# Patient Record
Sex: MALE | Race: BLACK OR AFRICAN AMERICAN | NOT HISPANIC OR LATINO | Employment: UNEMPLOYED | URBAN - METROPOLITAN AREA
[De-identification: names, ages, dates, MRNs, and addresses within clinical notes are randomized per-mention and may not be internally consistent; named-entity substitution may affect disease eponyms.]

---

## 2016-12-09 LAB — HEMOCCULT STL QL IA: NORMAL

## 2017-01-23 RX ORDER — GLYBURIDE 5 MG/1
TABLET ORAL
Qty: 120 TABLET | Refills: 0 | Status: SHIPPED | OUTPATIENT
Start: 2017-01-23 | End: 2017-03-17 | Stop reason: SDUPTHER

## 2017-02-22 ENCOUNTER — OFFICE VISIT (OUTPATIENT)
Dept: FAMILY MEDICINE | Facility: CLINIC | Age: 52
End: 2017-02-22
Payer: COMMERCIAL

## 2017-02-22 VITALS
OXYGEN SATURATION: 99 % | HEART RATE: 88 BPM | RESPIRATION RATE: 16 BRPM | SYSTOLIC BLOOD PRESSURE: 128 MMHG | DIASTOLIC BLOOD PRESSURE: 88 MMHG | HEIGHT: 75 IN | WEIGHT: 271.19 LBS | TEMPERATURE: 98 F | BODY MASS INDEX: 33.72 KG/M2

## 2017-02-22 DIAGNOSIS — J01.10 ACUTE NON-RECURRENT FRONTAL SINUSITIS: ICD-10-CM

## 2017-02-22 DIAGNOSIS — I10 ESSENTIAL HYPERTENSION: Primary | ICD-10-CM

## 2017-02-22 DIAGNOSIS — N18.30 CKD (CHRONIC KIDNEY DISEASE), STAGE III: ICD-10-CM

## 2017-02-22 PROCEDURE — 3074F SYST BP LT 130 MM HG: CPT | Mod: S$GLB,,, | Performed by: FAMILY MEDICINE

## 2017-02-22 PROCEDURE — 3046F HEMOGLOBIN A1C LEVEL >9.0%: CPT | Mod: S$GLB,,, | Performed by: FAMILY MEDICINE

## 2017-02-22 PROCEDURE — 99999 PR PBB SHADOW E&M-EST. PATIENT-LVL III: CPT | Mod: PBBFAC,,, | Performed by: FAMILY MEDICINE

## 2017-02-22 PROCEDURE — 3079F DIAST BP 80-89 MM HG: CPT | Mod: S$GLB,,, | Performed by: FAMILY MEDICINE

## 2017-02-22 PROCEDURE — 99214 OFFICE O/P EST MOD 30 MIN: CPT | Mod: S$GLB,,, | Performed by: FAMILY MEDICINE

## 2017-02-22 PROCEDURE — 1160F RVW MEDS BY RX/DR IN RCRD: CPT | Mod: S$GLB,,, | Performed by: FAMILY MEDICINE

## 2017-02-22 PROCEDURE — 4010F ACE/ARB THERAPY RXD/TAKEN: CPT | Mod: S$GLB,,, | Performed by: FAMILY MEDICINE

## 2017-02-22 PROCEDURE — 2022F DILAT RTA XM EVC RTNOPTHY: CPT | Mod: S$GLB,,, | Performed by: FAMILY MEDICINE

## 2017-02-22 RX ORDER — AZITHROMYCIN 250 MG/1
TABLET, FILM COATED ORAL
Qty: 6 TABLET | Refills: 0 | Status: SHIPPED | OUTPATIENT
Start: 2017-02-22 | End: 2017-05-16 | Stop reason: ALTCHOICE

## 2017-02-22 RX ORDER — AZITHROMYCIN 250 MG/1
TABLET, FILM COATED ORAL
Qty: 6 TABLET | Refills: 0 | Status: SHIPPED | OUTPATIENT
Start: 2017-02-22 | End: 2017-02-22 | Stop reason: SDUPTHER

## 2017-02-22 RX ORDER — LORATADINE 10 MG/1
10 TABLET ORAL DAILY
Qty: 30 TABLET | Refills: 1 | Status: SHIPPED | OUTPATIENT
Start: 2017-02-22 | End: 2018-06-01

## 2017-02-22 RX ORDER — FLUTICASONE PROPIONATE 50 MCG
1 SPRAY, SUSPENSION (ML) NASAL DAILY
Qty: 16 G | Refills: 12 | Status: SHIPPED | OUTPATIENT
Start: 2017-02-22 | End: 2017-03-24

## 2017-02-22 RX ORDER — LORATADINE 10 MG/1
10 TABLET ORAL DAILY
Qty: 30 TABLET | Refills: 1 | Status: SHIPPED | OUTPATIENT
Start: 2017-02-22 | End: 2017-02-22 | Stop reason: SDUPTHER

## 2017-02-22 RX ORDER — FLUTICASONE PROPIONATE 50 MCG
1 SPRAY, SUSPENSION (ML) NASAL DAILY
Qty: 16 G | Refills: 12 | Status: SHIPPED | OUTPATIENT
Start: 2017-02-22 | End: 2017-02-22 | Stop reason: SDUPTHER

## 2017-02-22 NOTE — MR AVS SNAPSHOT
[facility] [address] [place] [address]  Phone: [phone]  Fax: [phone]                  Catalino Mcfadden   2017 7:40 AM   Office Visit    Description:  Male : 1965   Provider:  Js Davis MD   Department:  [facility]           Reason for Visit     Nasal Congestion     Cough     Chills           Diagnoses this Visit        Comments    Essential hypertension    -  Primary     CKD (chronic kidney disease), stage III         Uncontrolled type 2 diabetes mellitus with complication, without long-term current use of insulin         Acute non-recurrent frontal sinusitis                To Do List           Goals (5 Years of Data)     None      Follow-Up and Disposition     Return in about 4 weeks (around 3/22/2017).       These Medications        Disp Refills Start End    azithromycin (ZITHROMAX Z-DON) 250 MG tablet 6 tablet 0 2017     2 tabs today, then 1 tab per day for 4 days.    Pharmacy: [facility] [address] [place] [address] [address] AT [facility] Ph #: [phone]       fluticasone (FLONASE) 50 mcg/actuation nasal spray 16 g 12 2017 3/24/2017    1 spray by Each Nare route once daily. - Each Nare    Pharmacy: [facility] [employer] [place] [address] [address] AT [facility] Ph #: [phone]       loratadine (CLARITIN) 10 mg tablet 30 tablet 1 2017     Take 1 tablet (10 mg total) by mouth once daily. - Oral    Pharmacy: [facility] [employer] [facility] [place] [address] [address] AT [facility] Ph #: [phone]         [facility]     [facility] On Call Nurse Care Line -  Assistance  Registered nurses in the [facility] provide clinical advisement, health education, appointment booking, and other advisory services.  Call for this free service at [phone].             Medications           Message regarding  Medications     Verify the changes and/or additions to your medication regime listed below are the same as discussed with your clinician today.  If any of these changes or additions are incorrect, please notify your healthcare provider.        START taking these NEW medications        Refills    azithromycin (ZITHROMAX Z-DON) 250 MG tablet 0    Si tabs today, then 1 tab per day for 4 days.    Class: Normal    fluticasone (FLONASE) 50 mcg/actuation nasal spray 12    Si spray by Each Nare route once daily.    Class: Normal    Route: Each Nare    loratadine (CLARITIN) 10 mg tablet 1    Sig: Take 1 tablet (10 mg total) by mouth once daily.    Class: Normal    Route: Oral           Verify that the below list of medications is an accurate representation of the medications you are currently taking.  If none reported, the list may be blank. If incorrect, please contact your healthcare provider. Carry this list with you in case of emergency.           Current Medications     blood sugar diagnostic Strp 1 strip by Misc.(Non-Drug; Combo Route) route 3 (three) times daily. Patient needs One Touch Test Strips (Berio).    CARTIA  mg 24 hr capsule TAKE 1 CAPSULE(240 MG) BY MOUTH EVERY DAY    chlorthalidone (HYGROTEN) 25 MG Tab TAKE 1 TABLET(25 MG) BY MOUTH EVERY DAY    glyBURIDE (DIABETA) 5 MG tablet TAKE 2 TABLETS(10 MG) BY MOUTH TWICE DAILY WITH MEALS    lisinopril (PRINIVIL,ZESTRIL) 40 MG tablet Take 1 tablet (40 mg total) by mouth once daily.    sitagliptin (JANUVIA) 100 MG Tab Take 1 tablet (100 mg total) by mouth once daily.    azithromycin (ZITHROMAX Z-DON) 250 MG tablet 2 tabs today, then 1 tab per day for 4 days.    fluticasone (FLONASE) 50 mcg/actuation nasal spray 1 spray by Each Nare route once daily.    loratadine (CLARITIN) 10 mg tablet Take 1 tablet (10 mg total) by mouth once daily.           Clinical Reference Information           Your Vitals Were     BP Pulse Temp Resp Height Weight    128/88 (BP  "Location: Left arm, Patient Position: Sitting, BP Method: Manual) 88 98.2 °F (36.8 °C) (Oral) 16 6' 3" (1.905 m) 123 kg (271 lb 2.7 oz)    SpO2 BMI             99% 33.89 kg/m2         Blood Pressure          Most Recent Value    BP  128/88      Allergies as of 2/22/2017     No Known Allergies      Immunizations Administered on Date of Encounter - 2/22/2017     None      Language Assistance Services     ATTENTION: Language assistance services are available, free of charge. Please call [phone].      ATENCIÓN: Si habla español, tiene a hahn disposición servicios gratuitos de asistencia lingüística. Llame al [phone].     CHÚ Ý: N?u b?n nói Ti?ng Vi?t, có các d?ch v? h? tr? ngôn ng? mi?n phí dành cho b?n. G?i s? [phone].         [facility] complies with applicable Federal civil rights laws and does not discriminate on the basis of race, color, national origin, age, disability, or sex.        "
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
## 2017-03-06 RX ORDER — SITAGLIPTIN 100 MG/1
TABLET, FILM COATED ORAL
Qty: 30 TABLET | Refills: 0 | Status: SHIPPED | OUTPATIENT
Start: 2017-03-06 | End: 2017-03-06

## 2017-03-06 NOTE — TELEPHONE ENCOUNTER
----- Message from Saida Palomino sent at 3/6/2017 10:30 AM CST -----  Contact: self  [facility]ill [facility] for [facility]li[facility] (JANUVIA) 100 MG Tab[facility]   Patient's #   [phone]    LL
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
## 2017-03-06 NOTE — TELEPHONE ENCOUNTER
Patient requesting refill of Januvia.      LOV  2/22/2017  Last refill 9/2/2016   HgA1c    10.5    8/30/2016

## 2017-03-07 ENCOUNTER — LAB VISIT (OUTPATIENT)
Dept: LAB | Facility: HOSPITAL | Age: 52
End: 2017-03-07
Attending: FAMILY MEDICINE
Payer: COMMERCIAL

## 2017-03-07 DIAGNOSIS — E11.9 TYPE 2 DIABETES MELLITUS WITHOUT COMPLICATION: ICD-10-CM

## 2017-03-07 LAB
CHOLEST/HDLC SERPL: 6.1 {RATIO}
HDL/CHOLESTEROL RATIO: 16.3 %
HDLC SERPL-MCNC: 215 MG/DL
HDLC SERPL-MCNC: 35 MG/DL
LDLC SERPL CALC-MCNC: 134.8 MG/DL
NONHDLC SERPL-MCNC: 180 MG/DL
TRIGL SERPL-MCNC: 226 MG/DL

## 2017-03-07 PROCEDURE — 83036 HEMOGLOBIN GLYCOSYLATED A1C: CPT

## 2017-03-07 PROCEDURE — 80061 LIPID PANEL: CPT

## 2017-03-07 PROCEDURE — 36415 COLL VENOUS BLD VENIPUNCTURE: CPT | Mod: PO

## 2017-03-08 LAB
ESTIMATED AVG GLUCOSE: 240 MG/DL
HBA1C MFR BLD HPLC: 10 %

## 2017-03-20 RX ORDER — GLYBURIDE 5 MG/1
TABLET ORAL
Qty: 120 TABLET | Refills: 1 | Status: SHIPPED | OUTPATIENT
Start: 2017-03-20 | End: 2017-05-16 | Stop reason: SDUPTHER

## 2017-04-10 DIAGNOSIS — I10 ESSENTIAL HYPERTENSION: ICD-10-CM

## 2017-04-10 NOTE — TELEPHONE ENCOUNTER
Patient requesting refill of medication.    LOV   2/22/2017 - [place]            9/21/2016 - [place]    Left message informing patient of need for office visit.

## 2017-04-10 NOTE — TELEPHONE ENCOUNTER
----- Message from Mari Garnett sent at 4/10/2017  2:17 PM CDT -----  Contact: self  Pt is requesting a refill for CARTIA  mg 24 hr capsule. [phone] [facility]

## 2017-04-11 RX ORDER — DILTIAZEM HYDROCHLORIDE 240 MG/1
CAPSULE, COATED, EXTENDED RELEASE ORAL
Qty: 30 CAPSULE | Refills: 2 | Status: SHIPPED | OUTPATIENT
Start: 2017-04-11 | End: 2017-05-16 | Stop reason: SDUPTHER

## 2017-05-16 ENCOUNTER — OFFICE VISIT (OUTPATIENT)
Dept: FAMILY MEDICINE | Facility: CLINIC | Age: 52
End: 2017-05-16
Payer: COMMERCIAL

## 2017-05-16 VITALS
DIASTOLIC BLOOD PRESSURE: 88 MMHG | WEIGHT: 257.94 LBS | HEIGHT: 75 IN | TEMPERATURE: 98 F | BODY MASS INDEX: 32.07 KG/M2 | SYSTOLIC BLOOD PRESSURE: 126 MMHG | HEART RATE: 84 BPM | RESPIRATION RATE: 16 BRPM | OXYGEN SATURATION: 99 %

## 2017-05-16 DIAGNOSIS — E78.00 PURE HYPERCHOLESTEROLEMIA: ICD-10-CM

## 2017-05-16 DIAGNOSIS — S13.0XXS: ICD-10-CM

## 2017-05-16 DIAGNOSIS — I10 ESSENTIAL HYPERTENSION: ICD-10-CM

## 2017-05-16 PROCEDURE — 4010F ACE/ARB THERAPY RXD/TAKEN: CPT | Mod: S$GLB,,, | Performed by: FAMILY MEDICINE

## 2017-05-16 PROCEDURE — 3079F DIAST BP 80-89 MM HG: CPT | Mod: S$GLB,,, | Performed by: FAMILY MEDICINE

## 2017-05-16 PROCEDURE — 1160F RVW MEDS BY RX/DR IN RCRD: CPT | Mod: S$GLB,,, | Performed by: FAMILY MEDICINE

## 2017-05-16 PROCEDURE — 99214 OFFICE O/P EST MOD 30 MIN: CPT | Mod: S$GLB,,, | Performed by: FAMILY MEDICINE

## 2017-05-16 PROCEDURE — 3074F SYST BP LT 130 MM HG: CPT | Mod: S$GLB,,, | Performed by: FAMILY MEDICINE

## 2017-05-16 PROCEDURE — 3046F HEMOGLOBIN A1C LEVEL >9.0%: CPT | Mod: S$GLB,,, | Performed by: FAMILY MEDICINE

## 2017-05-16 PROCEDURE — 99999 PR PBB SHADOW E&M-EST. PATIENT-LVL III: CPT | Mod: PBBFAC,,, | Performed by: FAMILY MEDICINE

## 2017-05-16 RX ORDER — DILTIAZEM HYDROCHLORIDE 240 MG/1
CAPSULE, COATED, EXTENDED RELEASE ORAL
Qty: 90 CAPSULE | Refills: 1 | Status: SHIPPED | OUTPATIENT
Start: 2017-05-16 | End: 2018-01-09 | Stop reason: SDUPTHER

## 2017-05-16 RX ORDER — GLYBURIDE 5 MG/1
5 TABLET ORAL 2 TIMES DAILY WITH MEALS
Qty: 180 TABLET | Refills: 1 | Status: SHIPPED | OUTPATIENT
Start: 2017-05-16 | End: 2018-01-09 | Stop reason: SDUPTHER

## 2017-05-16 RX ORDER — LISINOPRIL 20 MG/1
20 TABLET ORAL DAILY
Qty: 90 TABLET | Refills: 1 | Status: SHIPPED | OUTPATIENT
Start: 2017-05-16 | End: 2018-01-09 | Stop reason: SDUPTHER

## 2017-05-16 RX ORDER — CHLORTHALIDONE 25 MG/1
TABLET ORAL
Qty: 90 TABLET | Refills: 1 | Status: SHIPPED | OUTPATIENT
Start: 2017-05-16 | End: 2018-01-09 | Stop reason: SDUPTHER

## 2017-05-16 NOTE — PROGRESS NOTES
Chief Complaint   Patient presents with    Diabetes     hemoglobin A1C follow up     Hypertension       Catalino Mcfadden is a 52 y.o. male who presents per the Chief Complaint.  Pt is known to me and was last seen by me on 9/21/2016.  All known chronic medical issues have been documented.       Diabetes   He presents for his follow-up diabetic visit. He has type 2 diabetes mellitus. His disease course has been improving. Pertinent negatives for hypoglycemia include no confusion, dizziness, headaches, hunger, mood changes, nervousness/anxiousness, pallor, seizures, sleepiness, speech difficulty, sweats or tremors. There are no diabetic associated symptoms. Pertinent negatives for diabetes include no blurred vision, no chest pain, no fatigue, no foot paresthesias, no foot ulcerations (callus on right foot ), no polydipsia, no polyphagia, no polyuria, no visual change, no weakness and no weight loss. There are no hypoglycemic complications. Symptoms are improving. There are no diabetic complications. Pertinent negatives for diabetic complications include no CVA. Risk factors for coronary artery disease include diabetes mellitus, dyslipidemia, male sex, obesity and hypertension. Current diabetic treatment includes oral agent (monotherapy). He is compliant with treatment most of the time. His weight is decreasing steadily. He is following a generally healthy diet. He has not had a previous visit with a dietitian. He participates in exercise three times a week. His home blood glucose trend is decreasing steadily. An ACE inhibitor/angiotensin II receptor blocker is being taken. He does not see a podiatrist.Eye exam is current.   Hypertension   This is a chronic problem. The current episode started more than 1 year ago. The problem has been gradually improving since onset. The problem is controlled. Associated symptoms include neck pain. Pertinent negatives include no anxiety, blurred vision, chest pain, headaches, peripheral  edema, shortness of breath or sweats. There are no associated agents to hypertension. Risk factors for coronary artery disease include diabetes mellitus, dyslipidemia, male gender and obesity. Past treatments include beta blockers, calcium channel blockers, diuretics and ACE inhibitors. The current treatment provides moderate improvement. There are no compliance problems.  Hypertensive end-organ damage includes kidney disease. There is no history of angina, CAD/MI, CVA, heart failure, left ventricular hypertrophy or a thyroid problem. Identifiable causes of hypertension include chronic renal disease. There is no history of coarctation of the aorta, a hypertension causing med or sleep apnea.        ROS  Review of Systems   Constitutional: Negative.  Negative for activity change, appetite change, chills, diaphoresis, fatigue, fever, unexpected weight change and weight loss.   HENT: Negative.  Negative for congestion, ear pain, hearing loss, nosebleeds, postnasal drip, rhinorrhea, sinus pressure, sneezing, sore throat and trouble swallowing.    Eyes: Negative for blurred vision, pain and visual disturbance.   Respiratory: Negative for cough, choking and shortness of breath.    Cardiovascular: Negative for chest pain and leg swelling.   Gastrointestinal: Negative for abdominal pain, bowel incontinence, constipation, diarrhea, nausea and vomiting.   Endocrine: Negative for polydipsia, polyphagia and polyuria.   Genitourinary: Negative for bladder incontinence, difficulty urinating, dysuria, flank pain, frequency, pelvic pain and urgency.   Musculoskeletal: Positive for neck pain. Negative for arthralgias, back pain, gait problem, joint swelling and myalgias.   Skin: Negative.  Negative for pallor.   Allergic/Immunologic: Negative for environmental allergies and food allergies.   Neurological: Negative.  Negative for dizziness, tingling, tremors, seizures, syncope, speech difficulty, weakness, light-headedness, numbness,  "headaches and paresthesias.   Psychiatric/Behavioral: Negative.  Negative for confusion, decreased concentration, dysphoric mood and sleep disturbance. The patient is not nervous/anxious.        Physical Exam  Vitals:    05/16/17 1009   BP: 126/88   Pulse: 84   Resp: 16   Temp: 98.3 °F (36.8 °C)    Body mass index is 32.24 kg/(m^2).  Weight: 117 kg (257 lb 15 oz)   Height: 6' 3" (190.5 cm)     Physical Exam   Constitutional: He is oriented to person, place, and time. He appears well-developed and well-nourished. He is active and cooperative.  Non-toxic appearance. He does not have a sickly appearance. He does not appear ill. No distress.   HENT:   Head: Normocephalic and atraumatic.   Right Ear: Hearing, tympanic membrane, external ear and ear canal normal. No tenderness. No foreign bodies. No mastoid tenderness. Tympanic membrane is not injected, not scarred, not perforated, not erythematous, not retracted and not bulging. No hemotympanum. No decreased hearing is noted.   Left Ear: Hearing, tympanic membrane, external ear and ear canal normal. No tenderness. No foreign bodies. No mastoid tenderness. Tympanic membrane is not injected, not scarred, not perforated, not erythematous, not retracted and not bulging. No hemotympanum. No decreased hearing is noted.   Nose: Nose normal. No sinus tenderness, nasal deformity or septal deviation. No epistaxis.  No foreign bodies.   Mouth/Throat: Uvula is midline, oropharynx is clear and moist and mucous membranes are normal. He does not have dentures. Normal dentition. No uvula swelling or dental caries. No oropharyngeal exudate, posterior oropharyngeal edema, posterior oropharyngeal erythema or tonsillar abscesses.   Eyes: Conjunctivae, EOM and lids are normal. Pupils are equal, round, and reactive to light. Right eye exhibits no chemosis, no discharge, no exudate and no hordeolum. No foreign body present in the right eye. Left eye exhibits no chemosis, no discharge, no " exudate and no hordeolum. No foreign body present in the left eye. No scleral icterus.   Neck: Normal range of motion and full passive range of motion without pain. Neck supple. No thyroid mass and no thyromegaly present.   Cardiovascular: Normal rate, regular rhythm, S1 normal, S2 normal and normal heart sounds.  Exam reveals no gallop and no friction rub.    No murmur heard.  Pulmonary/Chest: Effort normal and breath sounds normal. He has no decreased breath sounds. He has no wheezes. He has no rhonchi. He has no rales.   Abdominal: Soft. Normal appearance and bowel sounds are normal. He exhibits no distension, no ascites and no mass. There is no hepatosplenomegaly. There is no tenderness. There is no rigidity, no rebound and no guarding. No hernia.   Musculoskeletal:        Cervical back: He exhibits tenderness. He exhibits normal range of motion, no bony tenderness, no swelling, no edema, no deformity, no laceration, no pain, no spasm and normal pulse.        Lumbar back: He exhibits tenderness, pain and spasm. He exhibits normal range of motion, no bony tenderness, no swelling, no edema, no deformity and normal pulse.   Return of full range of motion following surgery; pain resolved.   Lymphadenopathy:        Head (right side): No submental, no submandibular, no preauricular and no posterior auricular adenopathy present.        Head (left side): No submental, no submandibular, no preauricular and no posterior auricular adenopathy present.     He has no cervical adenopathy.   Neurological: He is alert and oriented to person, place, and time. He has normal strength. He displays no atrophy and no tremor. No cranial nerve deficit or sensory deficit. He exhibits normal muscle tone. He displays no seizure activity.   Skin: Skin is warm, dry and intact. No rash noted. He is not diaphoretic. No pallor.   Psychiatric: He has a normal mood and affect. His speech is normal and behavior is normal. Judgment and thought  content normal. Cognition and memory are normal. He is attentive.   Vitals reviewed.      Assessment & Plan    1. Uncontrolled type 2 diabetes mellitus with stage 3 chronic kidney disease, without long-term current use of insulin  Patient is encouraged to continue a diet low in carbohydrates and simple sugars.  Advised to begin or continue a weight loss program which focuses on good food choices and increased physical activity and encouraged to adhere to medication regimen and check glucose as recommended daily and report any changes in usual trends.  Screening blood test is not due at this time.  Foot exam was not done at this visit.  Will decrease dosage of glyburide due to home readings of low blood sugar.  - SITagliptan (JANUVIA) 100 MG Tab; Take 1 tablet (100 mg total) by mouth once daily.  Dispense: 90 tablet; Refill: 1  - glyBURIDE (DIABETA) 5 MG tablet; Take 1 tablet (5 mg total) by mouth 2 (two) times daily with meals.  Dispense: 180 tablet; Refill: 1    2. Essential hypertension  Patient was counseled and encouraged to maintain a low sodium diet, as well as increasing physical activity.  Recommend random BP checks at home on a regular basis.  Repeat BP at end of visit was not needed. Will continue medication at this time, and follow up in 4 weeks, or sooner if blood pressure is not well controlled.     - lisinopril (PRINIVIL,ZESTRIL) 20 MG tablet; Take 1 tablet (20 mg total) by mouth once daily.  Dispense: 90 tablet; Refill: 1  - chlorthalidone (HYGROTEN) 25 MG Tab; TAKE 1 TABLET(25 MG) BY MOUTH EVERY DAY  Dispense: 90 tablet; Refill: 1  - diltiaZEM (CARTIA XT) 240 MG 24 hr capsule; TAKE 1 CAPSULE(240 MG) BY MOUTH EVERY DAY  Dispense: 90 capsule; Refill: 1    3. Pure hypercholesterolemia  We discussed ways to manage cholesterol levels, including increasing whole grain foods and decreasing fried and fatty foods.  I also recommended OTC Omega 3 and Omega 6 supplements to improve overall cholesterol levels.   Will not start therapy at this visit; patient is not due for screening blood test at this time.  Significant lifestyle changes recently with positive results on blood pressure, blood sugar, and weight.    4. Traumatic rupture of cervical intervertebral disc, sequela  Managed by employer physician due to work related injury; stable.          Follow up documented    ACTIVE MEDICAL ISSUES:  Documented in Problem List    PAST MEDICAL HISTORY  Documented    PAST SURGICAL HISTORY:  Documented    SOCIAL HISTORY:  Documented    FAMILY HISTORY:  Documented    ALLERGIES AND MEDICATIONS: updated and reviewed.  Documented    Health Maintenance       Date Due Completion Date    TETANUS VACCINE 4/4/1982 ---    Pneumococcal PPSV23 (Medium Risk) (1) 4/4/1982 ---    Eye Exam 8/31/2016 8/31/2015    Override on 9/26/2014: Done (future)    Hemoglobin A1c 6/7/2017 3/7/2017    Override on 5/23/2016: Done    Override on 7/6/2015: Done (future)    Influenza Vaccine 8/1/2017 7/6/2015 (Declined)    Override on 7/6/2015: Declined (not at this present time)    Foot Exam 8/3/2017 8/3/2016    Override on 7/6/2015: Done (today in ov)    Lipid Panel 3/7/2018 3/7/2017    Colonoscopy 9/26/2024 9/26/2014 (Done)    Override on 9/26/2014: Done (future)

## 2017-05-25 ENCOUNTER — TELEPHONE (OUTPATIENT)
Dept: ADMINISTRATIVE | Facility: HOSPITAL | Age: 52
End: 2017-05-25

## 2017-06-05 ENCOUNTER — OFFICE VISIT (OUTPATIENT)
Dept: PODIATRY | Facility: CLINIC | Age: 52
End: 2017-06-05
Payer: COMMERCIAL

## 2017-06-05 VITALS
HEIGHT: 75 IN | DIASTOLIC BLOOD PRESSURE: 76 MMHG | WEIGHT: 257 LBS | SYSTOLIC BLOOD PRESSURE: 132 MMHG | BODY MASS INDEX: 31.95 KG/M2

## 2017-06-05 DIAGNOSIS — L84 CORN OR CALLUS: ICD-10-CM

## 2017-06-05 DIAGNOSIS — L84 PRE-ULCERATIVE CALLUSES: ICD-10-CM

## 2017-06-05 DIAGNOSIS — E11.49 TYPE II DIABETES MELLITUS WITH NEUROLOGICAL MANIFESTATIONS: Primary | ICD-10-CM

## 2017-06-05 DIAGNOSIS — M20.41 HAMMER TOES OF BOTH FEET: ICD-10-CM

## 2017-06-05 DIAGNOSIS — M20.42 HAMMER TOES OF BOTH FEET: ICD-10-CM

## 2017-06-05 DIAGNOSIS — M20.10 HALLUX ABDUCTO VALGUS, UNSPECIFIED LATERALITY: ICD-10-CM

## 2017-06-05 PROCEDURE — 99999 PR PBB SHADOW E&M-EST. PATIENT-LVL III: CPT | Mod: PBBFAC,,, | Performed by: PODIATRIST

## 2017-06-05 PROCEDURE — 99499 UNLISTED E&M SERVICE: CPT | Mod: S$GLB,,, | Performed by: PODIATRIST

## 2017-06-05 PROCEDURE — 11056 PARNG/CUTG B9 HYPRKR LES 2-4: CPT | Mod: S$GLB,,, | Performed by: PODIATRIST

## 2017-06-05 RX ORDER — LISINOPRIL 20 MG/1
TABLET ORAL
COMMUNITY
Start: 2017-05-16 | End: 2018-01-12

## 2017-06-05 NOTE — PATIENT INSTRUCTIONS
Recommend lotions: eucerin, aquaphor, A&D ointment, gold bond for diabetics        Shoe recommendations: (try 6pm.com, zappos.com , nordstromrack.Webcom, or shoes.com for discounted prices) you can visit [employer] shoes in [place] as well    Asics (GT 1000 or gel foundations), new balance, saucony (stabil c3),  Valencia (transcend), vionic, propet (tennis shoe)    soft brand, clarks, crocs, aerosoles, naturalizers, SAS, ecco, mode, pauline buck, rockports (dress shoes)    Vionic, volitiles, burkenstocks, fitflops, propet (sandals)    Nike comfort thong sandals, crocs (house shoes)      Nail Home remedy:  Vicks Vapor rub to cuticles  Listerine and apple cider vinegar in a spray bottle to nails       occasional soaks for 15-20 mins in luke warm water with 1 cup of listerine and 1 cup of apple cider vinegar is ok          Diabetes: Inspecting Your Feet    Diabetes increases your chances of developing foot problems. So inspect your feet every day. This helps you find small skin irritations before they become serious ulcers or infections. If you have trouble seeing the bottoms of your feet, use a mirror or ask a family member or friend to help.  How to check your feet  Below are tips to help you look for foot problems. Try to check your feet at the same time each day, such as when you get out of bed in the morning:  · Check the top of each foot. The tops of toes, back of the heel, and outer edge of the foot can get a lot of rubbing from poor-fitting shoes.  · Check the bottom of each foot. Daily wear and tear often leads to problems at pressure spots.  · Check the toes and nails. Fungal infections often occur between toes. Toenail problems can also be a sign of fungal infections or lead to breaks in the skin.  · Check your shoes, too. Loose objects inside a shoe can injure the foot. Use your hand to feel inside your shoes for things like james, loose stitching, or rough areas that could irritate your skin.  Warning signs  Look for  any color changes in the foot. Redness with streaks can signal a severe infection, which needs immediate medical attention. Tell your healthcare provider right away if you have any of these problems:  · Swelling, sometimes with color changes, may be a sign of poor blood flow or infection. Symptoms include tenderness and an increase in the size of your foot.  · Warm or hot areas on your feet may be signs of infection. A foot that is cold may not be getting enough blood.  · Sensations such as burning, tingling, or pins and needles can be signs of a problem. Also check for areas that may be numb.  · Hot spots are caused by friction or pressure. Look for hot spots in areas that get a lot of rubbing. Hot spots can turn into blisters, calluses, or sores.  · Cracks and sores are caused by dry or irritated skin. They are a sign that the skin is breaking down, which can lead to infection.  · Toenail problems to watch for include nails growing into the skin (ingrown toenail) and causing redness or pain. Thick, yellow, or discolored nails can signal a fungal infection.  · Drainage and odor can develop from untreated sores and ulcers. Call your healthcare provider right away if you notice white or yellow drainage, bleeding, or unpleasant odor.   Date Last Reviewed: 6/1/2016 © 2000-2016 [employer]. [address] [place] [address]. All rights reserved. This information is not intended as a substitute for professional medical care. Always follow your healthcare professional's instructions.        What Are Corns and Calluses?    Corns and calluses are your bodys response to friction or pressure against the skin. If your foot rubs the inside of your shoe, the affected area of skin thickens. Or, if a bone is not in the normal position, skin caught between bone and shoe or bone and ground builds up. In either case, the outer layer of skin thickens to protect the foot from unusual pressure. In many cases,  corns and calluses look bad but are not harmful. However, more severe corns and calluses may become infected, destroy healthy tissue, or affect foot movement.    Corns  Corns usually grow on top of the foot, often at the toe joint. Corns can range from a slight thickening of skin to a painful soft or hard bump. They often form on top of buckled toe joints (hammer toes). If your toes curl under, corns may grow on the tips of the toes. You may also get a corn on the end of a toe if it rubs against your shoe. Corns can also grow between toes, often between the first and second toes.    Calluses  Calluses grow on the bottom of the foot or on the outer edge of a toe or heel. A callus may spread across the ball of your foot. This type of callus is usually due to a problem with a metatarsal (the long bone at the base of a toe, near the ball of the foot). A pinch callus may grow along the outer edge of the heel or the big toe. Some calluses press up into the foot instead of spreading on the outside. A callus may form a central core or plug of tissue where pressure is greatest.  Date Last Reviewed: 9/21/2015 © 2000-2016 [employer] [address] [place] [address], [place] [address]. All rights reserved. This information is not intended as a substitute for professional medical care. Always follow your healthcare professional's instructions.
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
## 2017-06-16 ENCOUNTER — OFFICE VISIT (OUTPATIENT)
Dept: FAMILY MEDICINE | Facility: CLINIC | Age: 52
End: 2017-06-16
Payer: COMMERCIAL

## 2017-06-16 ENCOUNTER — LAB VISIT (OUTPATIENT)
Dept: LAB | Facility: HOSPITAL | Age: 52
End: 2017-06-16
Attending: FAMILY MEDICINE
Payer: COMMERCIAL

## 2017-06-16 VITALS
SYSTOLIC BLOOD PRESSURE: 130 MMHG | BODY MASS INDEX: 31.3 KG/M2 | HEART RATE: 80 BPM | HEIGHT: 75 IN | TEMPERATURE: 98 F | WEIGHT: 251.75 LBS | OXYGEN SATURATION: 98 % | DIASTOLIC BLOOD PRESSURE: 88 MMHG | RESPIRATION RATE: 15 BRPM

## 2017-06-16 DIAGNOSIS — E78.00 PURE HYPERCHOLESTEROLEMIA: ICD-10-CM

## 2017-06-16 DIAGNOSIS — Z13.5 SCREENING FOR EYE CONDITION: ICD-10-CM

## 2017-06-16 DIAGNOSIS — Z23 NEED FOR PNEUMOCOCCAL VACCINATION: ICD-10-CM

## 2017-06-16 DIAGNOSIS — I10 ESSENTIAL HYPERTENSION: ICD-10-CM

## 2017-06-16 LAB
CHOLEST/HDLC SERPL: 5.7 {RATIO}
HDL/CHOLESTEROL RATIO: 17.5 %
HDLC SERPL-MCNC: 228 MG/DL
HDLC SERPL-MCNC: 40 MG/DL
LDLC SERPL CALC-MCNC: 162.6 MG/DL
NONHDLC SERPL-MCNC: 188 MG/DL
TRIGL SERPL-MCNC: 127 MG/DL

## 2017-06-16 PROCEDURE — 90471 IMMUNIZATION ADMIN: CPT | Mod: S$GLB,,, | Performed by: FAMILY MEDICINE

## 2017-06-16 PROCEDURE — 4010F ACE/ARB THERAPY RXD/TAKEN: CPT | Mod: S$GLB,,, | Performed by: FAMILY MEDICINE

## 2017-06-16 PROCEDURE — 80061 LIPID PANEL: CPT

## 2017-06-16 PROCEDURE — 36415 COLL VENOUS BLD VENIPUNCTURE: CPT | Mod: PO

## 2017-06-16 PROCEDURE — 3045F PR MOST RECENT HEMOGLOBIN A1C LEVEL 7.0-9.0%: CPT | Mod: S$GLB,,, | Performed by: FAMILY MEDICINE

## 2017-06-16 PROCEDURE — 99999 PR PBB SHADOW E&M-EST. PATIENT-LVL III: CPT | Mod: PBBFAC,,, | Performed by: FAMILY MEDICINE

## 2017-06-16 PROCEDURE — 99214 OFFICE O/P EST MOD 30 MIN: CPT | Mod: 25,S$GLB,, | Performed by: FAMILY MEDICINE

## 2017-06-16 PROCEDURE — 83036 HEMOGLOBIN GLYCOSYLATED A1C: CPT

## 2017-06-16 PROCEDURE — 90732 PPSV23 VACC 2 YRS+ SUBQ/IM: CPT | Mod: S$GLB,,, | Performed by: FAMILY MEDICINE

## 2017-06-16 RX ORDER — LISINOPRIL 20 MG/1
20 TABLET ORAL DAILY
COMMUNITY
Start: 2017-06-14 | End: 2018-01-12

## 2017-06-16 NOTE — PROGRESS NOTES
Administered Pneumococcal 23 vaccine SC to right arm.  Patient tolerated injection well, no adverse reactions noted.

## 2017-06-16 NOTE — PROGRESS NOTES
Chief Complaint   Patient presents with    Diabetes     follow up        Catalino Mcfadden is a 52 y.o. male who presents per the Chief Complaint.  Pt is known to me and was last seen by me on 5/16/2017.  All known chronic medical issues have been documented.       Diabetes   He presents for his follow-up diabetic visit. He has type 2 diabetes mellitus. No MedicAlert identification noted. The initial diagnosis of diabetes was made 20 years ago. His disease course has been improving. Pertinent negatives for hypoglycemia include no confusion, dizziness, headaches, hunger, mood changes, nervousness/anxiousness, pallor, seizures, sleepiness, speech difficulty, sweats or tremors. Associated symptoms include foot paresthesias. Pertinent negatives for diabetes include no blurred vision, no chest pain, no fatigue, no foot ulcerations, no polydipsia, no polyphagia, no polyuria, no visual change, no weakness and no weight loss. There are no hypoglycemic complications. Pertinent negatives for hypoglycemia complications include no blackouts, no hospitalization, no nocturnal hypoglycemia, no required assistance and no required glucagon injection. Symptoms are improving. Diabetic complications include nephropathy, peripheral neuropathy and retinopathy. Pertinent negatives for diabetic complications include no autonomic neuropathy, CVA, heart disease or PVD. Risk factors for coronary artery disease include hypertension, obesity, sedentary lifestyle and diabetes mellitus. Current diabetic treatment includes diet and oral agent (dual therapy). He is compliant with treatment all of the time. His weight is decreasing steadily. He is following a generally healthy, low fat/cholesterol and low salt diet. Meal planning includes avoidance of concentrated sweets. He has not had a previous visit with a dietitian. He participates in exercise intermittently. He monitors blood glucose at home 1-2 x per day. He monitors urine at home <1 x per month.  Blood glucose monitoring compliance is fair. His home blood glucose trend is decreasing steadily. An ACE inhibitor/angiotensin II receptor blocker is being taken. He sees a podiatrist.Eye exam is not current.   Hypertension   This is a chronic problem. The current episode started more than 1 year ago. The problem is unchanged. The problem is controlled. Associated symptoms include neck pain. Pertinent negatives include no anxiety, blurred vision, chest pain, headaches, peripheral edema, shortness of breath or sweats. There are no associated agents to hypertension. Risk factors for coronary artery disease include diabetes mellitus, dyslipidemia, male gender and obesity. Past treatments include beta blockers, calcium channel blockers, diuretics and ACE inhibitors. The current treatment provides moderate improvement. There are no compliance problems.  Hypertensive end-organ damage includes kidney disease and retinopathy. There is no history of angina, CAD/MI, CVA, heart failure, left ventricular hypertrophy, PVD or a thyroid problem. Identifiable causes of hypertension include chronic renal disease. There is no history of coarctation of the aorta, a hypertension causing med or sleep apnea.        ROS  Review of Systems   Constitutional: Negative.  Negative for activity change, appetite change, chills, diaphoresis, fatigue, fever, unexpected weight change and weight loss.   HENT: Negative.  Negative for congestion, ear pain, hearing loss, nosebleeds, postnasal drip, rhinorrhea, sinus pressure, sneezing, sore throat and trouble swallowing.    Eyes: Negative for blurred vision, pain and visual disturbance.   Respiratory: Negative for cough, choking and shortness of breath.    Cardiovascular: Negative for chest pain and leg swelling.   Gastrointestinal: Positive for constipation. Negative for abdominal pain, diarrhea, nausea and vomiting.   Endocrine: Negative for polydipsia, polyphagia and polyuria.   Genitourinary:  "Negative for difficulty urinating, dysuria, frequency and urgency.   Musculoskeletal: Positive for neck pain. Negative for arthralgias, back pain, gait problem, joint swelling and myalgias.   Skin: Negative.  Negative for pallor.   Allergic/Immunologic: Negative for environmental allergies and food allergies.   Neurological: Negative.  Negative for dizziness, tremors, seizures, syncope, speech difficulty, weakness, light-headedness and headaches.   Psychiatric/Behavioral: Negative.  Negative for confusion, decreased concentration, dysphoric mood and sleep disturbance. The patient is not nervous/anxious.        Physical Exam  Vitals:    06/16/17 0931   BP: 130/88   Pulse: 80   Resp: 15   Temp: 98 °F (36.7 °C)    Body mass index is 31.47 kg/m².  Weight: 114.2 kg (251 lb 12.3 oz)   Height: 6' 3" (190.5 cm)     Physical Exam   Constitutional: He is oriented to person, place, and time. He appears well-developed and well-nourished. He is active and cooperative.  Non-toxic appearance. He does not have a sickly appearance. He does not appear ill. No distress.   HENT:   Head: Normocephalic and atraumatic.   Right Ear: Hearing and external ear normal. No decreased hearing is noted.   Left Ear: Hearing and external ear normal. No decreased hearing is noted.   Nose: Nose normal. No rhinorrhea or nasal deformity.   Mouth/Throat: Uvula is midline and oropharynx is clear and moist. He does not have dentures. Normal dentition.   Eyes: Conjunctivae, EOM and lids are normal. Pupils are equal, round, and reactive to light. Right eye exhibits no chemosis, no discharge and no exudate. No foreign body present in the right eye. Left eye exhibits no chemosis, no discharge and no exudate. No foreign body present in the left eye. No scleral icterus.   Neck: Normal range of motion and full passive range of motion without pain. Neck supple.   Cardiovascular: Normal rate, regular rhythm, S1 normal, S2 normal and normal heart sounds.  Exam " reveals no gallop and no friction rub.    No murmur heard.  Pulmonary/Chest: Effort normal and breath sounds normal. No accessory muscle usage. No respiratory distress. He has no decreased breath sounds. He has no wheezes. He has no rhonchi. He has no rales.   Abdominal: Soft. Normal appearance. He exhibits no distension. There is no hepatosplenomegaly. There is no tenderness. There is no rigidity, no rebound and no guarding.   Musculoskeletal: Normal range of motion.   Neurological: He is alert and oriented to person, place, and time. He has normal strength. No cranial nerve deficit or sensory deficit. He exhibits normal muscle tone. He displays no seizure activity. Coordination and gait normal.   Skin: Skin is warm, dry and intact. No rash noted. He is not diaphoretic.   Psychiatric: He has a normal mood and affect. His speech is normal and behavior is normal. Judgment and thought content normal. Cognition and memory are normal. He is attentive.       Assessment & Plan    1. Uncontrolled type 2 diabetes mellitus with stage 3 chronic kidney disease, without long-term current use of insulin  Patient is encouraged to continue a diet low in carbohydrates and simple sugars.  Advised to begin or continue a weight loss program which focuses on good food choices and increased physical activity and encouraged to adhere to medication regimen and check glucose as recommended daily and report any changes in usual trends.  Screening blood test is due at this time.  Foot exam was not done at this visit.   - Hemoglobin A1c; Future    2. Essential hypertension  Patient was counseled and encouraged to maintain a low sodium diet, as well as increasing physical activity.  Recommend random BP checks at home on a regular basis.  Repeat BP at end of visit was not necessary. Will continue medication at this time, and follow up in 3 months, or sooner if blood pressure is not well controlled.       3. Pure hypercholesterolemia  We  discussed ways to manage cholesterol levels, including increasing whole grain foods and decreasing fried and fatty foods.  I also recommended OTC Omega 3 and Omega 6 supplements to improve overall cholesterol levels.  Will continue current therapy at this visit; patient is due for screening blood test at this time.   - Lipid panel; Future    4. Need for pneumococcal vaccination  Patient due for pneumonia vaccine; Patient agreed and vaccine was administered in clinic today without complications.   - (In Office Administered) Pneumococcal Polysaccharide Vaccine (23 Valent) (SQ/IM)    5. Screening for eye condition  Managed by [facility] in [place].         Follow up documented    ACTIVE MEDICAL ISSUES:  Documented in Problem List    PAST MEDICAL HISTORY  Documented    PAST SURGICAL HISTORY:  Documented    SOCIAL HISTORY:  Documented    FAMILY HISTORY:  Documented    ALLERGIES AND MEDICATIONS: updated and reviewed.  Documented    Health Maintenance       Date Due Completion Date    TETANUS VACCINE 04/04/1982 ---    Pneumococcal PPSV23 (Medium Risk) (1) 04/04/1982 ---    Eye Exam 08/31/2016 8/31/2015    Override on 9/26/2014: Done (future)    Hemoglobin A1c 06/07/2017 3/7/2017    Override on 5/23/2016: Done    Override on 7/6/2015: Done (future)    Influenza Vaccine 08/01/2017 7/6/2015 (Declined)    Override on 7/6/2015: Declined (not at this present time)    Fecal Occult Blood Test (FOBT)/[facility] 12/09/2017 12/9/2016    Override on 12/9/2016: Done ([facility])    Lipid Panel 03/07/2018 3/7/2017    Foot Exam 06/05/2018 6/5/2017    Override on 7/6/2015: Done (today in ov)

## 2017-06-17 LAB
ESTIMATED AVG GLUCOSE: 160 MG/DL
HBA1C MFR BLD HPLC: 7.2 %

## 2017-06-19 ENCOUNTER — PATIENT MESSAGE (OUTPATIENT)
Dept: FAMILY MEDICINE | Facility: CLINIC | Age: 52
End: 2017-06-19

## 2017-06-19 DIAGNOSIS — E78.00 PURE HYPERCHOLESTEROLEMIA: Primary | ICD-10-CM

## 2017-06-19 RX ORDER — ATORVASTATIN CALCIUM 40 MG/1
40 TABLET, FILM COATED ORAL DAILY
Qty: 90 TABLET | Refills: 3 | Status: SHIPPED | OUTPATIENT
Start: 2017-06-19 | End: 2018-06-01

## 2017-09-20 ENCOUNTER — OFFICE VISIT (OUTPATIENT)
Dept: PODIATRY | Facility: CLINIC | Age: 52
End: 2017-09-20
Payer: COMMERCIAL

## 2017-09-20 VITALS
WEIGHT: 251 LBS | HEIGHT: 75 IN | SYSTOLIC BLOOD PRESSURE: 126 MMHG | HEART RATE: 92 BPM | BODY MASS INDEX: 31.21 KG/M2 | DIASTOLIC BLOOD PRESSURE: 88 MMHG

## 2017-09-20 DIAGNOSIS — M20.10 HALLUX ABDUCTO VALGUS, UNSPECIFIED LATERALITY: ICD-10-CM

## 2017-09-20 DIAGNOSIS — M20.42 HAMMER TOES OF BOTH FEET: ICD-10-CM

## 2017-09-20 DIAGNOSIS — L84 CORN OR CALLUS: ICD-10-CM

## 2017-09-20 DIAGNOSIS — L97.519 TYPE 2 DIABETES MELLITUS WITH RIGHT DIABETIC FOOT ULCER: ICD-10-CM

## 2017-09-20 DIAGNOSIS — E11.49 TYPE II DIABETES MELLITUS WITH NEUROLOGICAL MANIFESTATIONS: Primary | ICD-10-CM

## 2017-09-20 DIAGNOSIS — E11.621 TYPE 2 DIABETES MELLITUS WITH RIGHT DIABETIC FOOT ULCER: ICD-10-CM

## 2017-09-20 DIAGNOSIS — N18.30 CKD (CHRONIC KIDNEY DISEASE), STAGE III: ICD-10-CM

## 2017-09-20 DIAGNOSIS — M20.41 HAMMER TOES OF BOTH FEET: ICD-10-CM

## 2017-09-20 DIAGNOSIS — B35.1 ONYCHOMYCOSIS DUE TO DERMATOPHYTE: ICD-10-CM

## 2017-09-20 PROCEDURE — 99213 OFFICE O/P EST LOW 20 MIN: CPT | Mod: 25,S$GLB,, | Performed by: PODIATRIST

## 2017-09-20 PROCEDURE — 11056 PARNG/CUTG B9 HYPRKR LES 2-4: CPT | Mod: Q9,S$GLB,, | Performed by: PODIATRIST

## 2017-09-20 PROCEDURE — 99999 PR PBB SHADOW E&M-EST. PATIENT-LVL III: CPT | Mod: PBBFAC,,, | Performed by: PODIATRIST

## 2017-09-20 PROCEDURE — 3074F SYST BP LT 130 MM HG: CPT | Mod: S$GLB,,, | Performed by: PODIATRIST

## 2017-09-20 PROCEDURE — 3045F PR MOST RECENT HEMOGLOBIN A1C LEVEL 7.0-9.0%: CPT | Mod: S$GLB,,, | Performed by: PODIATRIST

## 2017-09-20 PROCEDURE — 3079F DIAST BP 80-89 MM HG: CPT | Mod: S$GLB,,, | Performed by: PODIATRIST

## 2017-09-20 PROCEDURE — 4010F ACE/ARB THERAPY RXD/TAKEN: CPT | Mod: S$GLB,,, | Performed by: PODIATRIST

## 2017-09-20 PROCEDURE — 3008F BODY MASS INDEX DOCD: CPT | Mod: S$GLB,,, | Performed by: PODIATRIST

## 2017-09-20 PROCEDURE — 11721 DEBRIDE NAIL 6 OR MORE: CPT | Mod: Q9,59,S$GLB, | Performed by: PODIATRIST
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
## 2017-09-20 NOTE — PATIENT INSTRUCTIONS
Wound care Instructions:   · Once a day beginning in7 days:   ¨ Clean the toe separate from your body with warm running water and antibacterial soap such as dial for five minutes.  ¨ Clean any remaining crust away with soap and water using a cotton-tipped applicator.  ¨ DRY COMPLETELY  ¨ Apply betadine to the wound.  ¨ Cover with a breathable bandage until there is no more drainage or open flesh.  · Change the dressing daily, or whenever it becomes wet or dirty.  · If you were prescribed antibiotics, take them as directed until they are all gone.  · Wear comfortable shoes with a lot of toe room, or open-toe sandals, while your toe is healing.  · You may use acetaminophen or ibuprofen to control pain, unless another medicine was prescribed. If you have chronic liver or kidney disease or ever had a stomach ulcer or GI bleeding, talk with your doctor before using these medicines.      When to seek medical advice  Call your health care provider right away if any of the following occur:  · Increasing redness, pain or swelling of the toe  · Red streaks in the skin leading away from the wound  · Continued pus or fluid drainage for more than 24 hours  · Fever of 100.4º F (38º C) or higher, or as directed by your health care provider      Recommend lotions: eucerin, aquaphor, A&D ointment, gold bond for diabetics, sween; urea 40 with aloe (found on amazon.com)    Shoe recommendations: (try 6pm.com, zappos.Bswift , [email or web address].Bswift, or shoes.Bswift for discounted prices) you can visit DSW shoes in [place] as well    Asics (GT 2000 or gel foundations), new balance, sauccassie (stabil c3),  [place] (GTS or Beast or transcend), vionic, propet (tennis shoe)    sofft brand, clarks, crocs, aerosoles, naturalizers, SAS, ecco, mode, pauline buck, rockports (dress shoes)    Vionic, burkenstocks, fitflops, propet (sandals)    Nike comfort thong sandals, crocs, propet (house shoes)    Nail Home remedy:  Vicks Vapor rub to nails for easier  managability    Occasional soaks for 15-20 mins in luke warm water with 1 cup of listerine and 1 cup of apple cider vinegar are ok You may add several drops of oil of oregano or tea tree oil as well          Diabetes: Inspecting Your Feet  Diabetes increases your chances of developing foot problems. So inspect your feet every day. This helps you find small skin irritations before they become serious infections. If you have trouble seeing the bottoms of your feet, use a mirror or ask a family member or friend to help.     Pressure spots on the bottom of the foot are common areas where problems develop.   How to check your feet  Below are tips to help you look for foot problems. Try to check your feet at the same time each day, such as when you get out of bed in the morning:  · Check the top of each foot. The tops of toes, back of the heel, and outer edge of the foot can get a lot of rubbing from poor-fitting shoes.  · Check the bottom of each foot. Daily wear and tear often leads to problems at pressure spots.  · Check the toes and nails. Fungal infections often occur between toes. Toenail problems can also be a sign of fungal infections or lead to breaks in the skin.  · Check your shoes, too. Loose objects inside a shoe can injure the foot. Use your hand to feel inside your shoes for things like james, loose stitching, or rough areas that could irritate your skin.  Warning signs  Look for any color changes in the foot. Redness with streaks can signal a severe infection, which needs immediate medical attention. Tell your doctor right away if you have any of these problems:  · Swelling, sometimes with color changes, may be a sign of poor blood flow or infection. Symptoms include tenderness and an increase in the size of your foot.  · Warm or hot areas on your feet may be signs of infection. A foot that is cold may not be getting enough blood.  · Sensations such as burning, tingling, or pins and needles can be  signs of a problem. Also check for areas that may be numb.  · Hot spots are caused by friction or pressure. Look for hot spots in areas that get a lot of rubbing. Hot spots can turn into blisters, calluses, or sores.  · Cracks and sores are caused by dry or irritated skin. They are a sign that the skin is breaking down, which can lead to infection.  · Toenail problems to watch for include nails growing into the skin (ingrown toenail) and causing redness or pain. Thick, yellow, or discolored nails can signal a fungal infection.  · Drainage and odor can develop from untreated sores and ulcers. Call your doctor right away if you notice white or yellow drainage, bleeding, or unpleasant odor.   © 3963-5184 [employer]. [address] [place] [address], [place] [address]. All rights reserved. This information is not intended as a substitute for professional medical care. Always follow your healthcare professional's instructions.

## 2017-09-28 DIAGNOSIS — E11.9 TYPE 2 DIABETES MELLITUS WITHOUT COMPLICATION: ICD-10-CM

## 2017-10-09 ENCOUNTER — OFFICE VISIT (OUTPATIENT)
Dept: PODIATRY | Facility: CLINIC | Age: 52
End: 2017-10-09
Payer: COMMERCIAL

## 2017-10-09 VITALS
HEIGHT: 75 IN | WEIGHT: 251 LBS | SYSTOLIC BLOOD PRESSURE: 138 MMHG | BODY MASS INDEX: 31.21 KG/M2 | DIASTOLIC BLOOD PRESSURE: 90 MMHG

## 2017-10-09 DIAGNOSIS — M20.42 HAMMER TOES OF BOTH FEET: ICD-10-CM

## 2017-10-09 DIAGNOSIS — M20.41 HAMMER TOES OF BOTH FEET: ICD-10-CM

## 2017-10-09 DIAGNOSIS — E11.621 TYPE 2 DIABETES MELLITUS WITH RIGHT DIABETIC FOOT ULCER: ICD-10-CM

## 2017-10-09 DIAGNOSIS — L97.519 TYPE 2 DIABETES MELLITUS WITH RIGHT DIABETIC FOOT ULCER: ICD-10-CM

## 2017-10-09 DIAGNOSIS — M20.10 HALLUX ABDUCTO VALGUS, UNSPECIFIED LATERALITY: ICD-10-CM

## 2017-10-09 DIAGNOSIS — E11.49 TYPE II DIABETES MELLITUS WITH NEUROLOGICAL MANIFESTATIONS: Primary | ICD-10-CM

## 2017-10-09 PROCEDURE — 99999 PR PBB SHADOW E&M-EST. PATIENT-LVL III: CPT | Mod: PBBFAC,,, | Performed by: PODIATRIST

## 2017-10-09 PROCEDURE — 99213 OFFICE O/P EST LOW 20 MIN: CPT | Mod: S$GLB,,, | Performed by: PODIATRIST
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
## 2017-10-10 LAB — HEMOCCULT STL QL IA: NORMAL

## 2017-10-16 ENCOUNTER — TELEPHONE (OUTPATIENT)
Dept: ADMINISTRATIVE | Facility: HOSPITAL | Age: 52
End: 2017-10-16

## 2017-11-28 ENCOUNTER — OFFICE VISIT (OUTPATIENT)
Dept: PODIATRY | Facility: CLINIC | Age: 52
End: 2017-11-28
Payer: COMMERCIAL

## 2017-11-28 VITALS
SYSTOLIC BLOOD PRESSURE: 141 MMHG | BODY MASS INDEX: 31.21 KG/M2 | DIASTOLIC BLOOD PRESSURE: 83 MMHG | HEART RATE: 75 BPM | WEIGHT: 251 LBS | HEIGHT: 75 IN

## 2017-11-28 DIAGNOSIS — L84 CORN OR CALLUS: ICD-10-CM

## 2017-11-28 DIAGNOSIS — E11.49 TYPE II DIABETES MELLITUS WITH NEUROLOGICAL MANIFESTATIONS: Primary | ICD-10-CM

## 2017-11-28 DIAGNOSIS — M20.42 HAMMER TOES OF BOTH FEET: ICD-10-CM

## 2017-11-28 DIAGNOSIS — M20.10 HALLUX ABDUCTO VALGUS, UNSPECIFIED LATERALITY: ICD-10-CM

## 2017-11-28 DIAGNOSIS — M20.41 HAMMER TOES OF BOTH FEET: ICD-10-CM

## 2017-11-28 DIAGNOSIS — N18.30 CKD (CHRONIC KIDNEY DISEASE), STAGE III: ICD-10-CM

## 2017-11-28 DIAGNOSIS — B35.1 ONYCHOMYCOSIS DUE TO DERMATOPHYTE: ICD-10-CM

## 2017-11-28 PROCEDURE — 11721 DEBRIDE NAIL 6 OR MORE: CPT | Mod: 59,Q9,S$GLB, | Performed by: PODIATRIST

## 2017-11-28 PROCEDURE — 99999 PR PBB SHADOW E&M-EST. PATIENT-LVL III: CPT | Mod: PBBFAC,,, | Performed by: PODIATRIST

## 2017-11-28 PROCEDURE — 11056 PARNG/CUTG B9 HYPRKR LES 2-4: CPT | Mod: Q9,S$GLB,, | Performed by: PODIATRIST

## 2017-11-28 PROCEDURE — 99499 UNLISTED E&M SERVICE: CPT | Mod: S$GLB,,, | Performed by: PODIATRIST
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
## 2018-01-09 DIAGNOSIS — I10 ESSENTIAL HYPERTENSION: ICD-10-CM

## 2018-01-12 RX ORDER — SITAGLIPTIN 100 MG/1
TABLET, FILM COATED ORAL
Qty: 90 TABLET | Refills: 0 | Status: SHIPPED | OUTPATIENT
Start: 2018-01-12 | End: 2018-06-01 | Stop reason: ALTCHOICE

## 2018-01-12 RX ORDER — CHLORTHALIDONE 25 MG/1
TABLET ORAL
Qty: 90 TABLET | Refills: 0 | Status: SHIPPED | OUTPATIENT
Start: 2018-01-12 | End: 2018-05-23 | Stop reason: SDUPTHER

## 2018-01-12 RX ORDER — DILTIAZEM HYDROCHLORIDE 240 MG/1
CAPSULE, EXTENDED RELEASE ORAL
Qty: 90 CAPSULE | Refills: 0 | Status: SHIPPED | OUTPATIENT
Start: 2018-01-12 | End: 2018-03-28 | Stop reason: SDUPTHER

## 2018-01-12 RX ORDER — LISINOPRIL 20 MG/1
TABLET ORAL
Qty: 90 TABLET | Refills: 0 | Status: SHIPPED | OUTPATIENT
Start: 2018-01-12 | End: 2018-03-28 | Stop reason: SDUPTHER

## 2018-01-12 RX ORDER — GLYBURIDE 5 MG/1
TABLET ORAL
Qty: 180 TABLET | Refills: 0 | Status: SHIPPED | OUTPATIENT
Start: 2018-01-12 | End: 2018-03-28 | Stop reason: SDUPTHER

## 2018-02-12 ENCOUNTER — OFFICE VISIT (OUTPATIENT)
Dept: FAMILY MEDICINE | Facility: CLINIC | Age: 53
End: 2018-02-12
Payer: COMMERCIAL

## 2018-02-12 ENCOUNTER — LAB VISIT (OUTPATIENT)
Dept: LAB | Facility: HOSPITAL | Age: 53
End: 2018-02-12
Attending: FAMILY MEDICINE
Payer: COMMERCIAL

## 2018-02-12 VITALS
DIASTOLIC BLOOD PRESSURE: 100 MMHG | HEIGHT: 75 IN | HEART RATE: 88 BPM | TEMPERATURE: 98 F | RESPIRATION RATE: 16 BRPM | OXYGEN SATURATION: 96 % | BODY MASS INDEX: 33.41 KG/M2 | WEIGHT: 268.75 LBS | SYSTOLIC BLOOD PRESSURE: 152 MMHG

## 2018-02-12 DIAGNOSIS — I10 ESSENTIAL HYPERTENSION: ICD-10-CM

## 2018-02-12 DIAGNOSIS — J32.0 CHRONIC MAXILLARY SINUSITIS: ICD-10-CM

## 2018-02-12 DIAGNOSIS — E78.00 PURE HYPERCHOLESTEROLEMIA: ICD-10-CM

## 2018-02-12 DIAGNOSIS — E11.9 TYPE 2 DIABETES MELLITUS WITHOUT COMPLICATION: ICD-10-CM

## 2018-02-12 LAB
CHOLEST SERPL-MCNC: 251 MG/DL
CHOLEST/HDLC SERPL: 6 {RATIO}
ESTIMATED AVG GLUCOSE: 252 MG/DL
HBA1C MFR BLD HPLC: 10.4 %
HDLC SERPL-MCNC: 42 MG/DL
HDLC SERPL: 16.7 %
LDLC SERPL CALC-MCNC: 182.6 MG/DL
NONHDLC SERPL-MCNC: 209 MG/DL
TRIGL SERPL-MCNC: 132 MG/DL

## 2018-02-12 PROCEDURE — 99214 OFFICE O/P EST MOD 30 MIN: CPT | Mod: S$GLB,,, | Performed by: FAMILY MEDICINE

## 2018-02-12 PROCEDURE — 36415 COLL VENOUS BLD VENIPUNCTURE: CPT | Mod: PO

## 2018-02-12 PROCEDURE — 3008F BODY MASS INDEX DOCD: CPT | Mod: S$GLB,,, | Performed by: FAMILY MEDICINE

## 2018-02-12 PROCEDURE — 80061 LIPID PANEL: CPT

## 2018-02-12 PROCEDURE — 99999 PR PBB SHADOW E&M-EST. PATIENT-LVL III: CPT | Mod: PBBFAC,,, | Performed by: FAMILY MEDICINE

## 2018-02-12 PROCEDURE — 83036 HEMOGLOBIN GLYCOSYLATED A1C: CPT

## 2018-02-12 RX ORDER — AZITHROMYCIN 250 MG/1
TABLET, FILM COATED ORAL
Qty: 6 TABLET | Refills: 0 | Status: SHIPPED | OUTPATIENT
Start: 2018-02-12 | End: 2018-03-28 | Stop reason: ALTCHOICE

## 2018-02-12 NOTE — PROGRESS NOTES
Chief Complaint   Patient presents with    Diabetes    Hypertension       Catalino Mcfadden is a 52 y.o. male who presents per the Chief Complaint.  Pt is known to me and was last seen by me on 6/16/2017.  All known chronic medical issues have been documented.       Diabetes   He presents for his follow-up diabetic visit. He has type 2 diabetes mellitus. No MedicAlert identification noted. The initial diagnosis of diabetes was made 20 years ago. His disease course has been improving. Pertinent negatives for hypoglycemia include no confusion, dizziness, headaches, hunger, mood changes, nervousness/anxiousness, pallor, seizures, sleepiness, speech difficulty, sweats or tremors. Associated symptoms include foot paresthesias. Pertinent negatives for diabetes include no blurred vision, no chest pain, no fatigue, no foot ulcerations, no polydipsia, no polyphagia, no polyuria, no visual change, no weakness and no weight loss. There are no hypoglycemic complications. Pertinent negatives for hypoglycemia complications include no blackouts, no hospitalization, no nocturnal hypoglycemia, no required assistance and no required glucagon injection. Symptoms are improving. Diabetic complications include nephropathy, peripheral neuropathy and retinopathy. Pertinent negatives for diabetic complications include no autonomic neuropathy, CVA, heart disease or PVD. Risk factors for coronary artery disease include hypertension, obesity, sedentary lifestyle and diabetes mellitus. Current diabetic treatment includes diet and oral agent (dual therapy). He is compliant with treatment all of the time. His weight is decreasing steadily. He is following a generally healthy, low fat/cholesterol and low salt diet. Meal planning includes avoidance of concentrated sweets. He has not had a previous visit with a dietitian. He participates in exercise intermittently. He monitors blood glucose at home 1-2 x per day. He monitors urine at home <1 x per  month. Blood glucose monitoring compliance is fair. His home blood glucose trend is decreasing steadily. An ACE inhibitor/angiotensin II receptor blocker is being taken. He sees a podiatrist.Eye exam is not current.   Hypertension   This is a chronic problem. The current episode started more than 1 year ago. The problem is unchanged. The problem is controlled. Associated symptoms include neck pain (improved). Pertinent negatives include no anxiety, blurred vision, chest pain, headaches, peripheral edema, shortness of breath or sweats. There are no associated agents to hypertension. Risk factors for coronary artery disease include diabetes mellitus, dyslipidemia, male gender and obesity. Past treatments include beta blockers, calcium channel blockers, diuretics and ACE inhibitors. The current treatment provides moderate improvement. There are no compliance problems.  Hypertensive end-organ damage includes kidney disease and retinopathy. There is no history of angina, CAD/MI, CVA, heart failure, left ventricular hypertrophy or PVD. Identifiable causes of hypertension include chronic renal disease. There is no history of coarctation of the aorta, a hypertension causing med, sleep apnea or a thyroid problem.        ROS  Review of Systems   Constitutional: Negative.  Negative for activity change, appetite change, chills, diaphoresis, fatigue, fever, unexpected weight change and weight loss.   HENT: Negative.  Negative for congestion, ear pain, hearing loss, nosebleeds, postnasal drip, rhinorrhea, sinus pressure, sneezing, sore throat and trouble swallowing.    Eyes: Negative for blurred vision, pain and visual disturbance.   Respiratory: Negative for cough, choking and shortness of breath.    Cardiovascular: Negative for chest pain and leg swelling.   Gastrointestinal: Positive for constipation. Negative for abdominal distention, abdominal pain, anal bleeding, blood in stool, diarrhea, nausea, rectal pain and vomiting.  "  Endocrine: Negative for polydipsia, polyphagia and polyuria.   Genitourinary: Negative for difficulty urinating, dysuria, frequency and urgency.   Musculoskeletal: Positive for neck pain (improved). Negative for arthralgias, back pain, gait problem, joint swelling, myalgias and neck stiffness.   Skin: Negative.  Negative for pallor.   Allergic/Immunologic: Negative for environmental allergies and food allergies.   Neurological: Negative.  Negative for dizziness, tremors, seizures, syncope, speech difficulty, weakness, light-headedness and headaches.   Psychiatric/Behavioral: Negative.  Negative for confusion, decreased concentration, dysphoric mood and sleep disturbance. The patient is not nervous/anxious.        Physical Exam  Vitals:    02/12/18 1116   BP: (!) 152/100   Pulse: 88   Resp: 16   Temp: 97.8 °F (36.6 °C)    Body mass index is 33.59 kg/m².  Weight: 121.9 kg (268 lb 11.9 oz)   Height: 6' 3" (190.5 cm)     Physical Exam   Constitutional: He is oriented to person, place, and time. He appears well-developed and well-nourished. He is active and cooperative.  Non-toxic appearance. He does not have a sickly appearance. He does not appear ill. No distress.   HENT:   Head: Normocephalic and atraumatic.   Right Ear: Hearing and external ear normal. No decreased hearing is noted.   Left Ear: Hearing and external ear normal. No decreased hearing is noted.   Nose: Nose normal. No rhinorrhea or nasal deformity.   Mouth/Throat: Uvula is midline and oropharynx is clear and moist. He does not have dentures. Normal dentition.   Eyes: Conjunctivae, EOM and lids are normal. Pupils are equal, round, and reactive to light. Right eye exhibits no chemosis, no discharge and no exudate. No foreign body present in the right eye. Left eye exhibits no chemosis, no discharge and no exudate. No foreign body present in the left eye. No scleral icterus.   Neck: Normal range of motion and full passive range of motion without pain. " Neck supple.   Cardiovascular: Normal rate, regular rhythm, S1 normal, S2 normal and normal heart sounds.  Exam reveals no gallop and no friction rub.    No murmur heard.  Pulmonary/Chest: Effort normal and breath sounds normal. No accessory muscle usage. No respiratory distress. He has no decreased breath sounds. He has no wheezes. He has no rhonchi. He has no rales.   Abdominal: Soft. Normal appearance. He exhibits no distension. There is no hepatosplenomegaly. There is no tenderness. There is no rigidity, no rebound and no guarding.   Musculoskeletal: Normal range of motion.   Neurological: He is alert and oriented to person, place, and time. He has normal strength. No cranial nerve deficit or sensory deficit. He exhibits normal muscle tone. He displays no seizure activity. Coordination and gait normal.   Skin: Skin is warm, dry and intact. No rash noted. He is not diaphoretic.   Psychiatric: He has a normal mood and affect. His speech is normal and behavior is normal. Judgment and thought content normal. Cognition and memory are normal. He is attentive.       Assessment & Plan    1. Uncontrolled type 2 diabetes mellitus with stage 3 chronic kidney disease, without long-term current use of insulin  Patient is encouraged to follow a diet low in carbohydrates and simple sugars.  Advised to focus on good food choices and increased physical activity and encouraged to adhere to medication regimen and check glucose as recommended daily and contact office if glucose levels are not improving over time.  Screening blood test is due at this time.  Foot exam was not done at this visit.   - Ambulatory referral to Ophthalmology    2. Essential hypertension  Patient was counseled and encouraged to maintain a low sodium diet, as well as increasing physical activity.  Recommend random BP checks at home on a regular basis.  Repeat BP at end of visit was not improved. Will continue medication at this time, and follow up in 4-6  weeks, or sooner if blood pressure does not improve over time.  Patient advised to return for nurse BP check in one week.     3. Pure hypercholesterolemia  We discussed ways to manage cholesterol levels, including increasing whole grain foods and decreasing fried and fatty foods.  I also recommended OTC Omega 3 and Omega 6 supplements to improve overall cholesterol levels.  Will continue current therapy at this visit; patient is due for screening blood test at this time.   - Lipid panel; Future    4. Chronic maxillary sinusitis  Chronic infection identified by dentist on x-ray; will start antibiotics to resolve infection and re-evaluate prior to planned dental work.  - azithromycin (Z-DON) 250 MG tablet; Take 2 tablets by mouth on day 1; Take 1 tablet by mouth on days 2-5  Dispense: 6 tablet; Refill: 0       Follow up documented    ACTIVE MEDICAL ISSUES:  Documented in Problem List    PAST MEDICAL HISTORY  Documented    PAST SURGICAL HISTORY:  Documented    SOCIAL HISTORY:  Documented    FAMILY HISTORY:  Documented    ALLERGIES AND MEDICATIONS: updated and reviewed.  Documented    Health Maintenance       Date Due Completion Date    TETANUS VACCINE 04/04/1982 ---    Eye Exam 08/31/2016 8/31/2015    Override on 9/26/2014: Done (future)    Influenza Vaccine 08/01/2017 7/6/2015 (Declined)    Override on 7/6/2015: Declined (not at this present time)    Hemoglobin A1c 09/16/2017 6/16/2017    Override on 5/23/2016: Done    Override on 7/6/2015: Done (future)    Lipid Panel 06/16/2018 6/16/2017    Fecal Occult Blood Test (FOBT)/FitKit 10/10/2018 10/10/2017 (Done)    Override on 10/10/2017: Done (bioIQ scanned)    Override on 12/9/2016: Done (Bio Iq lab)    Foot Exam 11/28/2018 11/28/2017 (Done)    Override on 11/28/2017: Done    Override on 9/20/2017: Done    Override on 7/6/2015: Done (today in ov)    Low Dose Statin 11/29/2018 11/29/2017    Pneumococcal PPSV23 (Medium Risk) (2) 04/04/2029 6/16/2017

## 2018-02-14 ENCOUNTER — TELEPHONE (OUTPATIENT)
Dept: OPTOMETRY | Facility: CLINIC | Age: 53
End: 2018-02-14

## 2018-03-13 DIAGNOSIS — I10 ESSENTIAL HYPERTENSION: ICD-10-CM

## 2018-03-13 RX ORDER — CHLORTHALIDONE 25 MG/1
TABLET ORAL
Qty: 90 TABLET | Refills: 0 | Status: CANCELLED | OUTPATIENT
Start: 2018-03-13

## 2018-03-13 RX ORDER — LISINOPRIL 20 MG/1
TABLET ORAL
Qty: 90 TABLET | Refills: 0 | Status: CANCELLED | OUTPATIENT
Start: 2018-03-13

## 2018-03-13 NOTE — TELEPHONE ENCOUNTER
Spoke with patient. States that he needs refills on medications. Also questioning about Trulicity. Says him and provider spoke about it before.Please advise.

## 2018-03-13 NOTE — TELEPHONE ENCOUNTER
----- Message from Ino Tavarez sent at 3/13/2018 10:40 AM CDT -----  Contact: Self  Pt needs PA on JANUVIA 100 mg Tab. Also requesting refill on lisinopril (PRINIVIL,ZESTRIL) 20 MG tablet and chlorthalidone (HYGROTEN) 25 MG Tab. Pt uses [facility]. Pt can be reached @ [phone].

## 2018-03-14 ENCOUNTER — CLINICAL SUPPORT (OUTPATIENT)
Dept: FAMILY MEDICINE | Facility: CLINIC | Age: 53
End: 2018-03-14
Payer: COMMERCIAL

## 2018-03-14 DIAGNOSIS — I10 ESSENTIAL HYPERTENSION: Primary | ICD-10-CM

## 2018-03-14 PROCEDURE — 99499 UNLISTED E&M SERVICE: CPT | Mod: S$GLB,,, | Performed by: FAMILY MEDICINE

## 2018-03-14 NOTE — PROGRESS NOTES
...  Catalino Mcfadden 52 y.o. male is here today for Blood Pressure check.   History of HTN yes.    Review of patient's allergies indicates:  No Known Allergies  Creatinine   Date Value Ref Range Status   05/23/2016 2.2 (H) 0.5 - 1.4 mg/dL Final     Sodium   Date Value Ref Range Status   05/23/2016 135 (L) 136 - 145 mmol/L Final     Potassium   Date Value Ref Range Status   05/23/2016 5.1 3.5 - 5.1 mmol/L Final   ]  Patient verifies taking blood pressure medications on a regular basis at the same time of the day.     Current Outpatient Prescriptions:     atorvastatin (LIPITOR) 40 MG tablet, Take 1 tablet (40 mg total) by mouth once daily., Disp: 90 tablet, Rfl: 3    azithromycin (Z-DON) 250 MG tablet, Take 2 tablets by mouth on day 1; Take 1 tablet by mouth on days 2-5, Disp: 6 tablet, Rfl: 0    blood sugar diagnostic Strp, 1 strip by Misc.(Non-Drug; Combo Route) route 3 (three) times daily. Patient needs One Touch Test Strips (Berio)., Disp: 100 strip, Rfl: 2    CARTIA  mg 24 hr capsule, TAKE 1 CAPSULE(240 MG) BY MOUTH EVERY DAY, Disp: 90 capsule, Rfl: 0    chlorthalidone (HYGROTEN) 25 MG Tab, TAKE 1 TABLET(25 MG) BY MOUTH EVERY DAY, Disp: 90 tablet, Rfl: 0    glyBURIDE (DIABETA) 5 MG tablet, TAKE 1 TABLET(5 MG) BY MOUTH TWICE DAILY WITH MEALS, Disp: 180 tablet, Rfl: 0    JANUVIA 100 mg Tab, TAKE 1 TABLET(100 MG) BY MOUTH EVERY DAY, Disp: 90 tablet, Rfl: 0    lisinopril (PRINIVIL,ZESTRIL) 20 MG tablet, TAKE 1 TABLET(20 MG) BY MOUTH EVERY DAY, Disp: 90 tablet, Rfl: 0    loratadine (CLARITIN) 10 mg tablet, Take 1 tablet (10 mg total) by mouth once daily., Disp: 30 tablet, Rfl: 1  Does patient have record of home blood pressure readings no. Readings have been averaging N/A.   Last dose of blood pressure medication was taken at 3/13/2018.  Patient is asymptomatic.   Complains of N/A.      ,   .    Blood pressure reading after 15 minutes was 138/92.  Dr. Lombard notified.

## 2018-03-28 ENCOUNTER — OFFICE VISIT (OUTPATIENT)
Dept: FAMILY MEDICINE | Facility: CLINIC | Age: 53
End: 2018-03-28
Payer: COMMERCIAL

## 2018-03-28 VITALS
HEIGHT: 75 IN | DIASTOLIC BLOOD PRESSURE: 88 MMHG | OXYGEN SATURATION: 97 % | RESPIRATION RATE: 17 BRPM | BODY MASS INDEX: 31.41 KG/M2 | SYSTOLIC BLOOD PRESSURE: 120 MMHG | HEART RATE: 94 BPM | WEIGHT: 252.63 LBS | TEMPERATURE: 98 F

## 2018-03-28 DIAGNOSIS — E78.00 PURE HYPERCHOLESTEROLEMIA: ICD-10-CM

## 2018-03-28 DIAGNOSIS — I10 ESSENTIAL HYPERTENSION: ICD-10-CM

## 2018-03-28 PROCEDURE — 3079F DIAST BP 80-89 MM HG: CPT | Mod: CPTII,S$GLB,, | Performed by: FAMILY MEDICINE

## 2018-03-28 PROCEDURE — 99999 PR PBB SHADOW E&M-EST. PATIENT-LVL III: CPT | Mod: PBBFAC,,, | Performed by: FAMILY MEDICINE

## 2018-03-28 PROCEDURE — 3046F HEMOGLOBIN A1C LEVEL >9.0%: CPT | Mod: CPTII,S$GLB,, | Performed by: FAMILY MEDICINE

## 2018-03-28 PROCEDURE — 99214 OFFICE O/P EST MOD 30 MIN: CPT | Mod: S$GLB,,, | Performed by: FAMILY MEDICINE

## 2018-03-28 PROCEDURE — 3074F SYST BP LT 130 MM HG: CPT | Mod: CPTII,S$GLB,, | Performed by: FAMILY MEDICINE

## 2018-03-28 RX ORDER — DILTIAZEM HYDROCHLORIDE 240 MG/1
CAPSULE, COATED, EXTENDED RELEASE ORAL
Qty: 90 CAPSULE | Refills: 0 | Status: SHIPPED | OUTPATIENT
Start: 2018-03-28 | End: 2018-04-22 | Stop reason: SDUPTHER

## 2018-03-28 RX ORDER — LISINOPRIL 20 MG/1
TABLET ORAL
Qty: 90 TABLET | Refills: 0 | Status: SHIPPED | OUTPATIENT
Start: 2018-03-28 | End: 2018-04-22 | Stop reason: SDUPTHER

## 2018-03-28 RX ORDER — GLYBURIDE 5 MG/1
TABLET ORAL
Qty: 180 TABLET | Refills: 0 | Status: SHIPPED | OUTPATIENT
Start: 2018-03-28 | End: 2018-04-22 | Stop reason: SDUPTHER

## 2018-03-28 NOTE — PROGRESS NOTES
Chief Complaint   Patient presents with    Hypertension    Diabetes       Catalino Mcfadden is a 52 y.o. male who presents per the Chief Complaint.  Pt is known to me and was last seen by me on 2/12/2018.  All known chronic medical issues have been documented.       Hypertension   This is a chronic problem. The current episode started more than 1 year ago. The problem is unchanged. The problem is controlled. Associated symptoms include neck pain (improved). Pertinent negatives include no anxiety, blurred vision, chest pain, headaches, peripheral edema, shortness of breath or sweats. There are no associated agents to hypertension. Risk factors for coronary artery disease include diabetes mellitus, dyslipidemia, male gender and obesity. Past treatments include beta blockers, calcium channel blockers, diuretics and ACE inhibitors. The current treatment provides moderate improvement. There are no compliance problems.  Hypertensive end-organ damage includes kidney disease and retinopathy. There is no history of angina, CAD/MI, CVA, heart failure, left ventricular hypertrophy or PVD. Identifiable causes of hypertension include chronic renal disease. There is no history of coarctation of the aorta, a hypertension causing med, sleep apnea or a thyroid problem.   Diabetes   He presents for his follow-up diabetic visit. He has type 2 diabetes mellitus. No MedicAlert identification noted. The initial diagnosis of diabetes was made 20 years ago. His disease course has been improving. Hypoglycemia symptoms include nervousness/anxiousness. Pertinent negatives for hypoglycemia include no confusion, dizziness, headaches, hunger, mood changes, pallor, seizures, sleepiness, speech difficulty, sweats or tremors. Associated symptoms include foot paresthesias. Pertinent negatives for diabetes include no blurred vision, no chest pain, no fatigue, no foot ulcerations, no polydipsia, no polyphagia, no polyuria, no visual change, no weakness  and no weight loss. There are no hypoglycemic complications. Pertinent negatives for hypoglycemia complications include no blackouts, no hospitalization, no nocturnal hypoglycemia, no required assistance and no required glucagon injection. Symptoms are improving. Diabetic complications include nephropathy, peripheral neuropathy and retinopathy. Pertinent negatives for diabetic complications include no autonomic neuropathy, CVA, heart disease or PVD. Risk factors for coronary artery disease include hypertension, obesity, sedentary lifestyle and diabetes mellitus. Current diabetic treatment includes diet and oral agent (dual therapy). He is compliant with treatment all of the time. His weight is decreasing steadily. He is following a generally healthy, low fat/cholesterol and low salt diet. Meal planning includes avoidance of concentrated sweets. He has not had a previous visit with a dietitian. He participates in exercise intermittently. He monitors blood glucose at home 1-2 x per day. He monitors urine at home <1 x per month. Blood glucose monitoring compliance is fair. His home blood glucose trend is decreasing steadily. An ACE inhibitor/angiotensin II receptor blocker is being taken. He sees a podiatrist.Eye exam is not current.        ROS  Review of Systems   Constitutional: Negative.  Negative for activity change, appetite change, chills, diaphoresis, fatigue, fever, unexpected weight change and weight loss.   HENT: Negative.  Negative for congestion, ear pain, hearing loss, nosebleeds, postnasal drip, rhinorrhea, sinus pressure, sneezing, sore throat and trouble swallowing.    Eyes: Negative for blurred vision, pain and visual disturbance.   Respiratory: Negative for cough, choking and shortness of breath.    Cardiovascular: Negative for chest pain and leg swelling.   Gastrointestinal: Positive for constipation. Negative for abdominal distention, abdominal pain, anal bleeding, blood in stool, diarrhea, nausea,  "rectal pain and vomiting.   Endocrine: Negative for polydipsia, polyphagia and polyuria.   Genitourinary: Negative for difficulty urinating, dysuria, frequency and urgency.   Musculoskeletal: Positive for neck pain (improved). Negative for arthralgias, back pain, gait problem, joint swelling, myalgias and neck stiffness.   Skin: Negative.  Negative for pallor.   Allergic/Immunologic: Negative for environmental allergies and food allergies.   Neurological: Negative.  Negative for dizziness, tremors, seizures, syncope, speech difficulty, weakness, light-headedness and headaches.   Psychiatric/Behavioral: Negative for confusion, decreased concentration, dysphoric mood and sleep disturbance. The patient is nervous/anxious.        Physical Exam  Vitals:    03/28/18 1346   BP: 120/88   Pulse: 94   Resp: 17   Temp: 98.2 °F (36.8 °C)    Body mass index is 31.58 kg/m².  Weight: 114.6 kg (252 lb 10.4 oz)   Height: 6' 3" (190.5 cm)     Physical Exam   Constitutional: He is oriented to person, place, and time. He appears well-developed and well-nourished. He is active and cooperative.  Non-toxic appearance. He does not have a sickly appearance. He does not appear ill. No distress.   HENT:   Head: Normocephalic and atraumatic.   Right Ear: Hearing and external ear normal. No decreased hearing is noted.   Left Ear: Hearing and external ear normal. No decreased hearing is noted.   Nose: Nose normal. No rhinorrhea or nasal deformity.   Mouth/Throat: Uvula is midline and oropharynx is clear and moist. He does not have dentures. Normal dentition.   Eyes: Conjunctivae, EOM and lids are normal. Pupils are equal, round, and reactive to light. Right eye exhibits no chemosis, no discharge and no exudate. No foreign body present in the right eye. Left eye exhibits no chemosis, no discharge and no exudate. No foreign body present in the left eye. No scleral icterus.   Neck: Normal range of motion and full passive range of motion without " pain. Neck supple.   Cardiovascular: Normal rate, regular rhythm, S1 normal, S2 normal and normal heart sounds.  Exam reveals no gallop and no friction rub.    No murmur heard.  Pulmonary/Chest: Effort normal and breath sounds normal. No accessory muscle usage. No respiratory distress. He has no decreased breath sounds. He has no wheezes. He has no rhonchi. He has no rales.   Abdominal: Soft. Normal appearance. He exhibits no distension. There is no hepatosplenomegaly. There is no tenderness. There is no rigidity, no rebound and no guarding.   Musculoskeletal: Normal range of motion.   Neurological: He is alert and oriented to person, place, and time. He has normal strength. No cranial nerve deficit or sensory deficit. He exhibits normal muscle tone. He displays no seizure activity. Coordination and gait normal.   Skin: Skin is warm, dry and intact. No rash noted. He is not diaphoretic.   Psychiatric: He has a normal mood and affect. His speech is normal and behavior is normal. Judgment and thought content normal. Cognition and memory are normal. He is attentive.       Assessment & Plan    1. Uncontrolled type 2 diabetes mellitus with stage 3 chronic kidney disease, without long-term current use of insulin  Patient is encouraged to follow a diet low in carbohydrates and simple sugars.  Advised to focus on good food choices and increased physical activity and encouraged to adhere to medication regimen and check glucose as recommended daily and contact office if glucose levels are not improving over time.  Screening blood test is not due at this time.  Foot exam was not done at this visit.  Will start injectable therapy at this time and recheck HbA1c in 6 weeks.  - glyBURIDE (DIABETA) 5 MG tablet; TAKE 1 TABLET(5 MG) BY MOUTH TWICE DAILY WITH MEALS  Dispense: 180 tablet; Refill: 0  - Hemoglobin A1c; Future  - dulaglutide 0.75 mg/0.5 mL PnIj; Inject 0.5 mLs (0.75 mg total) into the skin every 7 days.  Dispense: 4  Syringe; Refill: 2    2. Essential hypertension  Patient was counseled and encouraged to maintain a low sodium diet, as well as increasing physical activity.  Recommend random BP checks at home on a regular basis.  Repeat BP at end of visit was not necessary. Will continue medication at this time, and follow up in 3-6 months, or sooner if blood pressure begins to increase.     - lisinopril (PRINIVIL,ZESTRIL) 20 MG tablet; TAKE 1 TABLET(20 MG) BY MOUTH EVERY DAY  Dispense: 90 tablet; Refill: 0  - diltiaZEM (CARTIA XT) 240 MG 24 hr capsule; TAKE 1 CAPSULE(240 MG) BY MOUTH EVERY DAY  Dispense: 90 capsule; Refill: 0    3. Pure hypercholesterolemia  We discussed ways to manage cholesterol levels, including increasing whole grain foods and decreasing fried and fatty foods.  I also recommended OTC Omega 3 and Omega 6 supplements to improve overall cholesterol levels.  Will continue current therapy at this visit; patient is not due for screening blood test at this time.       Follow up documented    ACTIVE MEDICAL ISSUES:  Documented in Problem List    PAST MEDICAL HISTORY  Documented    PAST SURGICAL HISTORY:  Documented    SOCIAL HISTORY:  Documented    FAMILY HISTORY:  Documented    ALLERGIES AND MEDICATIONS: updated and reviewed.  Documented    Health Maintenance       Date Due Completion Date    TETANUS VACCINE 04/04/1982 ---    Eye Exam 08/31/2016 8/31/2015    Override on 9/26/2014: Done (future)    Influenza Vaccine 08/01/2017 7/6/2015 (Declined)    Override on 7/6/2015: Declined (not at this present time)    Hemoglobin A1c 05/12/2018 2/12/2018    Override on 5/23/2016: Done    Override on 7/6/2015: Done (future)    Fecal Occult Blood Test (FOBT)/[employer] 10/10/2018 10/10/2017 (Done)    Override on 10/10/2017: Done (bioIQ scanned)    Override on 12/9/2016: Done ([facility] lab)    Foot Exam 11/28/2018 11/28/2017 (Done)    Override on 11/28/2017: Done    Override on 9/20/2017: Done    Override on 7/6/2015: Done (today in  ov)    Low Dose Statin 11/29/2018 11/29/2017    Lipid Panel 02/12/2019 2/12/2018    Pneumococcal PPSV23 (Medium Risk) (2) 04/04/2029 6/16/2017

## 2018-04-03 ENCOUNTER — OFFICE VISIT (OUTPATIENT)
Dept: PODIATRY | Facility: CLINIC | Age: 53
End: 2018-04-03
Payer: COMMERCIAL

## 2018-04-03 VITALS
HEART RATE: 79 BPM | DIASTOLIC BLOOD PRESSURE: 88 MMHG | SYSTOLIC BLOOD PRESSURE: 145 MMHG | HEIGHT: 75 IN | WEIGHT: 252.63 LBS | BODY MASS INDEX: 31.41 KG/M2

## 2018-04-03 DIAGNOSIS — L97.512 DIABETIC ULCER OF OTHER PART OF RIGHT FOOT ASSOCIATED WITH TYPE 2 DIABETES MELLITUS, WITH FAT LAYER EXPOSED: ICD-10-CM

## 2018-04-03 DIAGNOSIS — E11.49 TYPE II DIABETES MELLITUS WITH NEUROLOGICAL MANIFESTATIONS: Primary | ICD-10-CM

## 2018-04-03 DIAGNOSIS — N18.30 CKD (CHRONIC KIDNEY DISEASE), STAGE III: ICD-10-CM

## 2018-04-03 DIAGNOSIS — E11.621 DIABETIC ULCER OF OTHER PART OF RIGHT FOOT ASSOCIATED WITH TYPE 2 DIABETES MELLITUS, WITH FAT LAYER EXPOSED: ICD-10-CM

## 2018-04-03 PROCEDURE — 11042 DBRDMT SUBQ TIS 1ST 20SQCM/<: CPT | Mod: S$GLB,,, | Performed by: PODIATRIST

## 2018-04-03 PROCEDURE — 3077F SYST BP >= 140 MM HG: CPT | Mod: CPTII,S$GLB,, | Performed by: PODIATRIST

## 2018-04-03 PROCEDURE — 3079F DIAST BP 80-89 MM HG: CPT | Mod: CPTII,S$GLB,, | Performed by: PODIATRIST

## 2018-04-03 PROCEDURE — 99214 OFFICE O/P EST MOD 30 MIN: CPT | Mod: 25,S$GLB,, | Performed by: PODIATRIST

## 2018-04-03 PROCEDURE — 3046F HEMOGLOBIN A1C LEVEL >9.0%: CPT | Mod: CPTII,S$GLB,, | Performed by: PODIATRIST

## 2018-04-03 PROCEDURE — 99999 PR PBB SHADOW E&M-EST. PATIENT-LVL III: CPT | Mod: PBBFAC,,, | Performed by: PODIATRIST

## 2018-04-03 RX ORDER — CEPHALEXIN 500 MG/1
500 CAPSULE ORAL EVERY 12 HOURS
Qty: 20 CAPSULE | Refills: 0 | Status: SHIPPED | OUTPATIENT
Start: 2018-04-03 | End: 2018-04-13

## 2018-04-03 NOTE — PATIENT INSTRUCTIONS
Please keep football dressing clean, dry, and intact.  If dressing gets wet please contact our office.    Wear special shoe every time foot is placed on the floor.    Elevate affected foot as much as possible    Stay hydrated.      Nutrition and MyPlate: Protein Foods  This group includes foods that are high in protein. Protein helps the body build new cells and keeps tissues healthy. Most Americans get enough protein without even trying. It can be harder for vegetarians, but plenty of non-meat foods are rich in protein, too. Its best to get protein from a variety of sources.    Nutrient-Rich Choices  Theres a lot more to this food group than just meat and beans. It also includes nuts, seeds, and eggs. There are all sorts of nutrient-rich choices:  · Chicken and turkey with the skin removed  · Fish and shellfish  · Lean beef, pork, or lamb (without visible fat)  · Soy products, such as tofu, soybeans (edamame), tempeh, or soymilk  · Black beans, kidney beans, corrigan beans, chickpeas (garbanzo beans), and lentils (Note: beans and peas count as both a protein and a vegetable)  · Peanuts, almonds, walnuts, sesame seeds, and sunflower  seeds, as well as foods made from these (such as peanut butter or tahini)  · Eggs and foods made with eggs (such as quiche or frittata)  What Makes Meat and Beans Less Healthy?  · Fatty meat is not healthy. Before you cook meat, trim off all the fat you can see. Chicken and turkey skin is also high in fat, and should be removed before cooking.  · Breading and frying make food less healthy. This includes dishes like fried chicken, fried fish, and refried beans.  · Sausage and lunch meats tend to be high in fat and salt. Buy low-fat, low-sodium versions.  One Small Change  Make a meal that includes a non-meat source of protein (such as tofu, lentils, or any other food listed above). Have a better idea? Write it here:  _____________________________________________________________  ©  5642-9635 [employer]. [address] [place] [address], [place] [address]. All rights reserved. This information is not intended as a substitute for professional medical care. Always follow your healthcare professional's instructions.
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
## 2018-04-11 ENCOUNTER — OFFICE VISIT (OUTPATIENT)
Dept: PODIATRY | Facility: CLINIC | Age: 53
End: 2018-04-11
Payer: COMMERCIAL

## 2018-04-11 VITALS
BODY MASS INDEX: 31.33 KG/M2 | DIASTOLIC BLOOD PRESSURE: 90 MMHG | HEART RATE: 78 BPM | SYSTOLIC BLOOD PRESSURE: 137 MMHG | WEIGHT: 252 LBS | HEIGHT: 75 IN

## 2018-04-11 DIAGNOSIS — L97.512 DIABETIC ULCER OF OTHER PART OF RIGHT FOOT ASSOCIATED WITH TYPE 2 DIABETES MELLITUS, WITH FAT LAYER EXPOSED: ICD-10-CM

## 2018-04-11 DIAGNOSIS — E11.621 DIABETIC ULCER OF OTHER PART OF RIGHT FOOT ASSOCIATED WITH TYPE 2 DIABETES MELLITUS, WITH FAT LAYER EXPOSED: ICD-10-CM

## 2018-04-11 DIAGNOSIS — E11.49 TYPE II DIABETES MELLITUS WITH NEUROLOGICAL MANIFESTATIONS: Primary | ICD-10-CM

## 2018-04-11 PROCEDURE — 99213 OFFICE O/P EST LOW 20 MIN: CPT | Mod: S$GLB,,, | Performed by: PODIATRIST

## 2018-04-11 PROCEDURE — 3080F DIAST BP >= 90 MM HG: CPT | Mod: CPTII,S$GLB,, | Performed by: PODIATRIST

## 2018-04-11 PROCEDURE — 3046F HEMOGLOBIN A1C LEVEL >9.0%: CPT | Mod: CPTII,S$GLB,, | Performed by: PODIATRIST

## 2018-04-11 PROCEDURE — 3075F SYST BP GE 130 - 139MM HG: CPT | Mod: CPTII,S$GLB,, | Performed by: PODIATRIST

## 2018-04-11 PROCEDURE — 99999 PR PBB SHADOW E&M-EST. PATIENT-LVL III: CPT | Mod: PBBFAC,,, | Performed by: PODIATRIST

## 2018-04-11 RX ORDER — PENICILLIN V POTASSIUM 500 MG/1
TABLET, FILM COATED ORAL
COMMUNITY
Start: 2018-04-05 | End: 2018-04-25 | Stop reason: ALTCHOICE

## 2018-04-11 RX ORDER — IBUPROFEN 800 MG/1
400 TABLET ORAL
COMMUNITY
Start: 2018-04-07 | End: 2020-03-10

## 2018-04-11 RX ORDER — HYDROCODONE BITARTRATE AND ACETAMINOPHEN 7.5; 325 MG/1; MG/1
1 TABLET ORAL
COMMUNITY
Start: 2018-04-06 | End: 2020-03-10

## 2018-04-11 NOTE — PATIENT INSTRUCTIONS
Wound care Instructions:   · Once a day beginning in 24 hours::   ¨ Clean the foot separate from your body with warm running water and antibacterial soap such as dial for five minutes.  ¨ Clean any remaining crust away with soap and water using a cotton-tipped applicator.  ¨ DRY COMPLETELY  ¨ Apply betadine with sugar to the foot.  ¨ Cover with a breathable bandage until there is no more drainage or open flesh.  · Change the dressing daily, or whenever it becomes wet or dirty.  If you were prescribed antibiotics, take them as directed until they are all gone.    When to seek medical advice  Call your health care provider right away if any of the following occur:  · Increasing redness, pain or swelling of the toe  · Red streaks in the skin leading away from the wound  · Continued pus or fluid drainage for more than 24 hours  · Fever of 100.4º F (38º C) or higher, or as directed by your health care provider
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
## 2018-04-12 DIAGNOSIS — E11.9 DIABETES MELLITUS WITHOUT COMPLICATION: ICD-10-CM

## 2018-04-18 ENCOUNTER — OFFICE VISIT (OUTPATIENT)
Dept: PODIATRY | Facility: CLINIC | Age: 53
End: 2018-04-18
Payer: COMMERCIAL

## 2018-04-18 VITALS — DIASTOLIC BLOOD PRESSURE: 62 MMHG | SYSTOLIC BLOOD PRESSURE: 90 MMHG

## 2018-04-18 DIAGNOSIS — E11.621 DIABETIC ULCER OF OTHER PART OF RIGHT FOOT ASSOCIATED WITH TYPE 2 DIABETES MELLITUS, WITH FAT LAYER EXPOSED: ICD-10-CM

## 2018-04-18 DIAGNOSIS — E11.49 TYPE II DIABETES MELLITUS WITH NEUROLOGICAL MANIFESTATIONS: Primary | ICD-10-CM

## 2018-04-18 DIAGNOSIS — L97.512 DIABETIC ULCER OF OTHER PART OF RIGHT FOOT ASSOCIATED WITH TYPE 2 DIABETES MELLITUS, WITH FAT LAYER EXPOSED: ICD-10-CM

## 2018-04-18 PROCEDURE — 3078F DIAST BP <80 MM HG: CPT | Mod: CPTII,S$GLB,, | Performed by: PODIATRIST

## 2018-04-18 PROCEDURE — 3074F SYST BP LT 130 MM HG: CPT | Mod: CPTII,S$GLB,, | Performed by: PODIATRIST

## 2018-04-18 PROCEDURE — 99213 OFFICE O/P EST LOW 20 MIN: CPT | Mod: S$GLB,,, | Performed by: PODIATRIST

## 2018-04-18 PROCEDURE — 3046F HEMOGLOBIN A1C LEVEL >9.0%: CPT | Mod: CPTII,S$GLB,, | Performed by: PODIATRIST

## 2018-04-18 PROCEDURE — 99999 PR PBB SHADOW E&M-EST. PATIENT-LVL II: CPT | Mod: PBBFAC,,, | Performed by: PODIATRIST

## 2018-04-18 NOTE — PATIENT INSTRUCTIONS
Please keep football dressing clean, dry, and intact.  If dressing gets wet please contact our office.    Wear special shoe every time foot is placed on the floor.    Elevate affected foot as much as possible    Stay hydrated.      Nutrition and MyPlate: Protein Foods  This group includes foods that are high in protein. Protein helps the body build new cells and keeps tissues healthy. Most Americans get enough protein without even trying. It can be harder for vegetarians, but plenty of non-meat foods are rich in protein, too. Its best to get protein from a variety of sources.    Nutrient-Rich Choices  Theres a lot more to this food group than just meat and beans. It also includes nuts, seeds, and eggs. There are all sorts of nutrient-rich choices:  · Chicken and turkey with the skin removed  · Fish and shellfish  · Lean beef, pork, or lamb (without visible fat)  · Soy products, such as tofu, soybeans (edamame), tempeh, or soymilk  · Black beans, kidney beans, corrigan beans, chickpeas (garbanzo beans), and lentils (Note: beans and peas count as both a protein and a vegetable)  · Peanuts, almonds, walnuts, sesame seeds, and sunflower  seeds, as well as foods made from these (such as peanut butter or tahini)  · Eggs and foods made with eggs (such as quiche or frittata)  What Makes Meat and Beans Less Healthy?  · Fatty meat is not healthy. Before you cook meat, trim off all the fat you can see. Chicken and turkey skin is also high in fat, and should be removed before cooking.  · Breading and frying make food less healthy. This includes dishes like fried chicken, fried fish, and refried beans.  · Sausage and lunch meats tend to be high in fat and salt. Buy low-fat, low-sodium versions.  One Small Change  Make a meal that includes a non-meat source of protein (such as tofu, lentils, or any other food listed above). Have a better idea? Write it here:  _____________________________________________________________  ©  1812-1377 [employer]. [address] [place] [address], [place] [address]. All rights reserved. This information is not intended as a substitute for professional medical care. Always follow your healthcare professional's instructions.
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
## 2018-04-22 ENCOUNTER — PATIENT MESSAGE (OUTPATIENT)
Dept: FAMILY MEDICINE | Facility: CLINIC | Age: 53
End: 2018-04-22

## 2018-04-22 DIAGNOSIS — I10 ESSENTIAL HYPERTENSION: ICD-10-CM

## 2018-04-25 ENCOUNTER — OFFICE VISIT (OUTPATIENT)
Dept: FAMILY MEDICINE | Facility: CLINIC | Age: 53
End: 2018-04-25
Payer: COMMERCIAL

## 2018-04-25 VITALS
BODY MASS INDEX: 31.39 KG/M2 | TEMPERATURE: 98 F | RESPIRATION RATE: 17 BRPM | OXYGEN SATURATION: 99 % | HEART RATE: 80 BPM | DIASTOLIC BLOOD PRESSURE: 86 MMHG | WEIGHT: 252.44 LBS | SYSTOLIC BLOOD PRESSURE: 120 MMHG | HEIGHT: 75 IN

## 2018-04-25 DIAGNOSIS — I10 ESSENTIAL HYPERTENSION: ICD-10-CM

## 2018-04-25 PROCEDURE — 3008F BODY MASS INDEX DOCD: CPT | Mod: CPTII,S$GLB,, | Performed by: FAMILY MEDICINE

## 2018-04-25 PROCEDURE — 3079F DIAST BP 80-89 MM HG: CPT | Mod: CPTII,S$GLB,, | Performed by: FAMILY MEDICINE

## 2018-04-25 PROCEDURE — 99999 PR PBB SHADOW E&M-EST. PATIENT-LVL III: CPT | Mod: PBBFAC,,, | Performed by: FAMILY MEDICINE

## 2018-04-25 PROCEDURE — 99214 OFFICE O/P EST MOD 30 MIN: CPT | Mod: S$GLB,,, | Performed by: FAMILY MEDICINE

## 2018-04-25 PROCEDURE — 3074F SYST BP LT 130 MM HG: CPT | Mod: CPTII,S$GLB,, | Performed by: FAMILY MEDICINE

## 2018-04-25 PROCEDURE — 3046F HEMOGLOBIN A1C LEVEL >9.0%: CPT | Mod: CPTII,S$GLB,, | Performed by: FAMILY MEDICINE

## 2018-04-25 NOTE — PROGRESS NOTES
Chief Complaint   Patient presents with    Dizziness    DOT form       Catalino Mcfadden is a 53 y.o. male who presents per the Chief Complaint.  Pt is known to me and was last seen by me on 3/28/2018.  All known chronic medical issues have been documented.       Dizziness:   Chronicity:  New  Onset:  Yesterday  Progression since onset:  Gradually improving  Frequency:  Every few minutes  Pain Scale:  0/10  Severity:  Mild  Dizziness characteristics:  Sensation of movementno hearing loss, no ear congestion, no ear pain, no fever, no headaches, no tinnitus, no nausea, no vomiting, no diaphoresis, no aural fullness, no weakness, no visual disturbances, no light-headedness, no syncope, no palpitations, no panic, no facial weakness, no slurred speech, no numbness in extremities and no chest pain.  Aggravated by:  Getting up  Treatments tried:  Rest  Improvements on treatment:  Moderateno strokes, no cardiac surgery, no neurologic disease, no head trauma, no balance testing, no ear trauma, no ear surgery, no head trauma, no ear infections, no anxiety, no ear tubes, no environmental allergies, no MRI head and no CT head.  Hypertension   This is a chronic problem. The current episode started more than 1 year ago. The problem is unchanged. The problem is controlled. Associated symptoms include neck pain (improved). Pertinent negatives include no anxiety, blurred vision, chest pain, headaches, palpitations, peripheral edema, shortness of breath or sweats. There are no associated agents to hypertension. Risk factors for coronary artery disease include diabetes mellitus, dyslipidemia, male gender and obesity. Past treatments include beta blockers, calcium channel blockers, diuretics and ACE inhibitors. The current treatment provides moderate improvement. There are no compliance problems.  Hypertensive end-organ damage includes kidney disease and retinopathy. There is no history of angina, CAD/MI, CVA, heart failure, left  ventricular hypertrophy or PVD. Identifiable causes of hypertension include chronic renal disease. There is no history of coarctation of the aorta, a hypertension causing med, sleep apnea or a thyroid problem.   Diabetes   He presents for his follow-up diabetic visit. He has type 2 diabetes mellitus. No MedicAlert identification noted. The initial diagnosis of diabetes was made 20 years ago. His disease course has been improving. Hypoglycemia symptoms include dizziness and nervousness/anxiousness. Pertinent negatives for hypoglycemia include no confusion, headaches, hunger, mood changes, pallor, seizures, sleepiness, speech difficulty, sweats or tremors. Associated symptoms include foot paresthesias. Pertinent negatives for diabetes include no blurred vision, no chest pain, no fatigue, no foot ulcerations, no polydipsia, no polyphagia, no polyuria, no visual change, no weakness and no weight loss. There are no hypoglycemic complications. Pertinent negatives for hypoglycemia complications include no blackouts, no hospitalization, no nocturnal hypoglycemia, no required assistance and no required glucagon injection. Symptoms are improving. Diabetic complications include nephropathy, peripheral neuropathy and retinopathy. Pertinent negatives for diabetic complications include no autonomic neuropathy, CVA, heart disease or PVD. Risk factors for coronary artery disease include hypertension, obesity, sedentary lifestyle and diabetes mellitus. Current diabetic treatment includes diet and oral agent (dual therapy). He is compliant with treatment all of the time. His weight is decreasing steadily. He is following a generally healthy, low fat/cholesterol and low salt diet. Meal planning includes avoidance of concentrated sweets. He has not had a previous visit with a dietitian. He participates in exercise intermittently. He monitors blood glucose at home 1-2 x per day. He monitors urine at home <1 x per month. Blood glucose  "monitoring compliance is fair. His home blood glucose trend is decreasing steadily. An ACE inhibitor/angiotensin II receptor blocker is being taken. He sees a podiatrist.Eye exam is not current.        ROS  Review of Systems   Constitutional: Negative for diaphoresis, fatigue, fever and weight loss.   HENT: Negative for ear pain, hearing loss and tinnitus.    Eyes: Negative for blurred vision.   Respiratory: Negative for shortness of breath.    Cardiovascular: Negative for chest pain, palpitations and syncope.   Gastrointestinal: Negative for nausea and vomiting.   Endocrine: Negative for polydipsia, polyphagia and polyuria.   Musculoskeletal: Positive for neck pain (improved).   Skin: Negative for pallor.   Allergic/Immunologic: Negative for environmental allergies.   Neurological: Positive for dizziness. Negative for tremors, seizures, speech difficulty, weakness, light-headedness and headaches.   Psychiatric/Behavioral: Negative for confusion. The patient is nervous/anxious.        Physical Exam  Vitals:    04/25/18 1514   BP: 120/86   Pulse: 80   Resp: 17   Temp: 97.9 °F (36.6 °C)    Body mass index is 31.55 kg/m².  Weight: 114.5 kg (252 lb 6.8 oz)   Height: 6' 3" (190.5 cm)     Physical Exam   Constitutional: He is oriented to person, place, and time. He appears well-developed and well-nourished. He is active and cooperative.  Non-toxic appearance. He does not have a sickly appearance. He does not appear ill. No distress.   HENT:   Head: Normocephalic and atraumatic.   Right Ear: Hearing and external ear normal. No decreased hearing is noted.   Left Ear: Hearing and external ear normal. No decreased hearing is noted.   Nose: Nose normal. No rhinorrhea or nasal deformity.   Mouth/Throat: Uvula is midline and oropharynx is clear and moist. He does not have dentures. Normal dentition.   Eyes: Conjunctivae, EOM and lids are normal. Pupils are equal, round, and reactive to light. Right eye exhibits no chemosis, no " discharge and no exudate. No foreign body present in the right eye. Left eye exhibits no chemosis, no discharge and no exudate. No foreign body present in the left eye. No scleral icterus.   Neck: Normal range of motion and full passive range of motion without pain. Neck supple.   Cardiovascular: Normal rate, regular rhythm, S1 normal, S2 normal and normal heart sounds.  Exam reveals no gallop and no friction rub.    No murmur heard.  Pulmonary/Chest: Effort normal and breath sounds normal. No accessory muscle usage. No respiratory distress. He has no decreased breath sounds. He has no wheezes. He has no rhonchi. He has no rales.   Abdominal: Soft. Normal appearance. He exhibits no distension. There is no hepatosplenomegaly. There is no tenderness. There is no rigidity, no rebound and no guarding.   Musculoskeletal: Normal range of motion.        Cervical back: He exhibits tenderness and pain. He exhibits normal range of motion, no bony tenderness, no swelling, no edema, no deformity, no laceration, no spasm and normal pulse.   Neurological: He is alert and oriented to person, place, and time. He has normal strength. No cranial nerve deficit or sensory deficit. He exhibits normal muscle tone. He displays no seizure activity. Coordination and gait normal.   Skin: Skin is warm, dry and intact. No rash noted. He is not diaphoretic.   Psychiatric: He has a normal mood and affect. His speech is normal and behavior is normal. Judgment and thought content normal. Cognition and memory are normal. He is attentive.       Assessment & Plan    1. Uncontrolled type 2 diabetes mellitus with stage 3 chronic kidney disease, without long-term current use of insulin  Patient is encouraged to follow a diet low in carbohydrates and simple sugars.  Advised to focus on good food choices and increased physical activity and encouraged to adhere to medication regimen and check glucose as recommended daily and contact office if glucose  levels are not improving over time.  Screening blood test is not due at this time.  Foot exam was not done at this visit.  Discussed symptoms of low blood sugar as related to dizziness.    2. Essential hypertension  Patient was counseled and encouraged to maintain a low sodium diet, as well as increasing physical activity.  Recommend random BP checks at home on a regular basis.  Repeat BP at end of visit was not necessary. Will continue medication at this time, and follow up in 3-6 months, or sooner if blood pressure begins to increase.  Also, dizziness may be associated with occasional low BP with position changes.  Will monitor and consider dose adjustments as needed.         Follow up documented    ACTIVE MEDICAL ISSUES:  Documented in Problem List    PAST MEDICAL HISTORY  Documented    PAST SURGICAL HISTORY:  Documented    SOCIAL HISTORY:  Documented    FAMILY HISTORY:  Documented    ALLERGIES AND MEDICATIONS: updated and reviewed.  Documented    Health Maintenance       Date Due Completion Date    TETANUS VACCINE 04/04/1982 ---    Influenza Vaccine 08/01/2017 7/6/2015 (Declined)    Override on 7/6/2015: Declined (not at this present time)    Hemoglobin A1c 05/12/2018 2/12/2018    Override on 5/23/2016: Done    Override on 7/6/2015: Done (future)    Fecal Occult Blood Test (FOBT)/FitKit 10/10/2018 10/10/2017 (Done)    Override on 10/10/2017: Done (bioIQ scanned)    Override on 12/9/2016: Done (Bio Iq lab)    Lipid Panel 02/12/2019 2/12/2018    Eye Exam 02/23/2019 2/23/2018 (Done)    Override on 2/23/2018: Done (vision [facility] fax [phone])    Override on 9/26/2014: Done (future)    Foot Exam 04/03/2019 4/3/2018 (Done)    Override on 4/3/2018: Done    Override on 11/28/2017: Done    Override on 9/20/2017: Done    Override on 7/6/2015: Done (today in ov)    Low Dose Statin 04/11/2019 4/11/2018    Pneumococcal PPSV23 (Medium Risk) (2) 04/04/2029 6/16/2017

## 2018-04-30 RX ORDER — GLYBURIDE 5 MG/1
TABLET ORAL
Qty: 180 TABLET | Refills: 0 | Status: SHIPPED | OUTPATIENT
Start: 2018-04-30 | End: 2018-06-20 | Stop reason: SDUPTHER

## 2018-04-30 RX ORDER — LISINOPRIL 20 MG/1
TABLET ORAL
Qty: 90 TABLET | Refills: 0 | Status: SHIPPED | OUTPATIENT
Start: 2018-04-30 | End: 2018-06-20 | Stop reason: SDUPTHER

## 2018-04-30 RX ORDER — DILTIAZEM HYDROCHLORIDE 240 MG/1
CAPSULE, EXTENDED RELEASE ORAL
Qty: 90 CAPSULE | Refills: 0 | Status: SHIPPED | OUTPATIENT
Start: 2018-04-30 | End: 2018-06-20 | Stop reason: SDUPTHER

## 2018-05-02 ENCOUNTER — OFFICE VISIT (OUTPATIENT)
Dept: PODIATRY | Facility: CLINIC | Age: 53
End: 2018-05-02
Payer: COMMERCIAL

## 2018-05-02 VITALS
BODY MASS INDEX: 31.33 KG/M2 | HEIGHT: 75 IN | SYSTOLIC BLOOD PRESSURE: 140 MMHG | DIASTOLIC BLOOD PRESSURE: 90 MMHG | WEIGHT: 252 LBS

## 2018-05-02 DIAGNOSIS — M20.41 HAMMER TOES OF BOTH FEET: ICD-10-CM

## 2018-05-02 DIAGNOSIS — M20.10 HALLUX ABDUCTO VALGUS, UNSPECIFIED LATERALITY: ICD-10-CM

## 2018-05-02 DIAGNOSIS — L84 CORN OR CALLUS: ICD-10-CM

## 2018-05-02 DIAGNOSIS — E11.49 TYPE II DIABETES MELLITUS WITH NEUROLOGICAL MANIFESTATIONS: Primary | ICD-10-CM

## 2018-05-02 DIAGNOSIS — Z87.2 HEALED ULCER OF RIGHT FOOT ON EXAMINATION: ICD-10-CM

## 2018-05-02 DIAGNOSIS — M20.42 HAMMER TOES OF BOTH FEET: ICD-10-CM

## 2018-05-02 DIAGNOSIS — N18.30 CKD (CHRONIC KIDNEY DISEASE), STAGE III: ICD-10-CM

## 2018-05-02 PROCEDURE — 99213 OFFICE O/P EST LOW 20 MIN: CPT | Mod: S$GLB,,, | Performed by: PODIATRIST

## 2018-05-02 PROCEDURE — 3008F BODY MASS INDEX DOCD: CPT | Mod: CPTII,S$GLB,, | Performed by: PODIATRIST

## 2018-05-02 PROCEDURE — 3046F HEMOGLOBIN A1C LEVEL >9.0%: CPT | Mod: CPTII,S$GLB,, | Performed by: PODIATRIST

## 2018-05-02 PROCEDURE — 3077F SYST BP >= 140 MM HG: CPT | Mod: CPTII,S$GLB,, | Performed by: PODIATRIST

## 2018-05-02 PROCEDURE — 99999 PR PBB SHADOW E&M-EST. PATIENT-LVL III: CPT | Mod: PBBFAC,,, | Performed by: PODIATRIST

## 2018-05-02 PROCEDURE — 3080F DIAST BP >= 90 MM HG: CPT | Mod: CPTII,S$GLB,, | Performed by: PODIATRIST
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
## 2018-05-08 ENCOUNTER — PATIENT MESSAGE (OUTPATIENT)
Dept: FAMILY MEDICINE | Facility: CLINIC | Age: 53
End: 2018-05-08

## 2018-05-10 ENCOUNTER — LAB VISIT (OUTPATIENT)
Dept: LAB | Facility: HOSPITAL | Age: 53
End: 2018-05-10
Attending: FAMILY MEDICINE
Payer: COMMERCIAL

## 2018-05-10 LAB
ESTIMATED AVG GLUCOSE: 143 MG/DL
HBA1C MFR BLD HPLC: 6.6 %

## 2018-05-10 PROCEDURE — 36415 COLL VENOUS BLD VENIPUNCTURE: CPT | Mod: PO

## 2018-05-10 PROCEDURE — 83036 HEMOGLOBIN GLYCOSYLATED A1C: CPT

## 2018-05-22 ENCOUNTER — PATIENT MESSAGE (OUTPATIENT)
Dept: PODIATRY | Facility: CLINIC | Age: 53
End: 2018-05-22

## 2018-05-23 ENCOUNTER — TELEPHONE (OUTPATIENT)
Dept: FAMILY MEDICINE | Facility: CLINIC | Age: 53
End: 2018-05-23

## 2018-05-23 DIAGNOSIS — I10 ESSENTIAL HYPERTENSION: ICD-10-CM

## 2018-05-24 RX ORDER — CHLORTHALIDONE 25 MG/1
25 TABLET ORAL DAILY
Qty: 90 TABLET | Refills: 1 | Status: SHIPPED | OUTPATIENT
Start: 2018-05-24 | End: 2018-09-17 | Stop reason: SDUPTHER

## 2018-05-30 ENCOUNTER — PATIENT MESSAGE (OUTPATIENT)
Dept: PODIATRY | Facility: CLINIC | Age: 53
End: 2018-05-30

## 2018-06-01 ENCOUNTER — OFFICE VISIT (OUTPATIENT)
Dept: PODIATRY | Facility: CLINIC | Age: 53
End: 2018-06-01
Payer: COMMERCIAL

## 2018-06-01 VITALS
DIASTOLIC BLOOD PRESSURE: 100 MMHG | WEIGHT: 252 LBS | SYSTOLIC BLOOD PRESSURE: 156 MMHG | BODY MASS INDEX: 31.33 KG/M2 | HEIGHT: 75 IN

## 2018-06-01 DIAGNOSIS — L03.032 CELLULITIS OF SECOND TOE, LEFT: ICD-10-CM

## 2018-06-01 DIAGNOSIS — E11.49 TYPE II DIABETES MELLITUS WITH NEUROLOGICAL MANIFESTATIONS: Primary | ICD-10-CM

## 2018-06-01 DIAGNOSIS — R60.0 BILATERAL LOWER EXTREMITY EDEMA: ICD-10-CM

## 2018-06-01 DIAGNOSIS — S90.425A BLISTER OF THIRD TOE OF LEFT FOOT, INITIAL ENCOUNTER: ICD-10-CM

## 2018-06-01 DIAGNOSIS — S90.425A BLISTER OF SECOND TOE OF LEFT FOOT, INITIAL ENCOUNTER: ICD-10-CM

## 2018-06-01 PROCEDURE — 99213 OFFICE O/P EST LOW 20 MIN: CPT | Mod: S$GLB,,, | Performed by: PODIATRIST

## 2018-06-01 PROCEDURE — 3077F SYST BP >= 140 MM HG: CPT | Mod: CPTII,S$GLB,, | Performed by: PODIATRIST

## 2018-06-01 PROCEDURE — 99999 PR PBB SHADOW E&M-EST. PATIENT-LVL III: CPT | Mod: PBBFAC,,, | Performed by: PODIATRIST

## 2018-06-01 PROCEDURE — 3008F BODY MASS INDEX DOCD: CPT | Mod: CPTII,S$GLB,, | Performed by: PODIATRIST

## 2018-06-01 PROCEDURE — 3044F HG A1C LEVEL LT 7.0%: CPT | Mod: CPTII,S$GLB,, | Performed by: PODIATRIST

## 2018-06-01 PROCEDURE — 3080F DIAST BP >= 90 MM HG: CPT | Mod: CPTII,S$GLB,, | Performed by: PODIATRIST

## 2018-06-01 RX ORDER — CLINDAMYCIN HYDROCHLORIDE 300 MG/1
300 CAPSULE ORAL 3 TIMES DAILY
Qty: 21 CAPSULE | Refills: 0 | Status: SHIPPED | OUTPATIENT
Start: 2018-06-01 | End: 2018-06-08
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
## 2018-06-13 ENCOUNTER — OFFICE VISIT (OUTPATIENT)
Dept: PODIATRY | Facility: CLINIC | Age: 53
End: 2018-06-13
Payer: COMMERCIAL

## 2018-06-13 VITALS
DIASTOLIC BLOOD PRESSURE: 81 MMHG | SYSTOLIC BLOOD PRESSURE: 144 MMHG | BODY MASS INDEX: 31.33 KG/M2 | HEIGHT: 75 IN | WEIGHT: 252 LBS

## 2018-06-13 DIAGNOSIS — E11.49 TYPE II DIABETES MELLITUS WITH NEUROLOGICAL MANIFESTATIONS: Primary | ICD-10-CM

## 2018-06-13 DIAGNOSIS — E08.621 DIABETIC ULCER OF TOE OF LEFT FOOT ASSOCIATED WITH DIABETES MELLITUS DUE TO UNDERLYING CONDITION, WITH FAT LAYER EXPOSED: ICD-10-CM

## 2018-06-13 DIAGNOSIS — L97.522 DIABETIC ULCER OF TOE OF LEFT FOOT ASSOCIATED WITH DIABETES MELLITUS DUE TO UNDERLYING CONDITION, WITH FAT LAYER EXPOSED: ICD-10-CM

## 2018-06-13 PROCEDURE — 99999 PR PBB SHADOW E&M-EST. PATIENT-LVL III: CPT | Mod: PBBFAC,,, | Performed by: PODIATRIST

## 2018-06-13 PROCEDURE — 99214 OFFICE O/P EST MOD 30 MIN: CPT | Mod: S$GLB,,, | Performed by: PODIATRIST

## 2018-06-13 PROCEDURE — 3079F DIAST BP 80-89 MM HG: CPT | Mod: CPTII,S$GLB,, | Performed by: PODIATRIST

## 2018-06-13 PROCEDURE — 3044F HG A1C LEVEL LT 7.0%: CPT | Mod: CPTII,S$GLB,, | Performed by: PODIATRIST

## 2018-06-13 PROCEDURE — 3008F BODY MASS INDEX DOCD: CPT | Mod: CPTII,S$GLB,, | Performed by: PODIATRIST

## 2018-06-13 PROCEDURE — 3077F SYST BP >= 140 MM HG: CPT | Mod: CPTII,S$GLB,, | Performed by: PODIATRIST
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
## 2018-06-13 NOTE — PATIENT INSTRUCTIONS
Please keep football dressing clean, dry, and intact.  If dressing gets wet please contact our office.    Wear special shoe every time foot is placed on the floor.    Elevate affected foot as much as possible    Stay hydrated.      Nutrition and MyPlate: Protein Foods  This group includes foods that are high in protein. Protein helps the body build new cells and keeps tissues healthy. Most Americans get enough protein without even trying. It can be harder for vegetarians, but plenty of non-meat foods are rich in protein, too. Its best to get protein from a variety of sources.    Nutrient-Rich Choices  Theres a lot more to this food group than just meat and beans. It also includes nuts, seeds, and eggs. There are all sorts of nutrient-rich choices:  · Chicken and turkey with the skin removed  · Fish and shellfish  · Lean beef, pork, or lamb (without visible fat)  · Soy products, such as tofu, soybeans (edamame), tempeh, or soymilk  · Black beans, kidney beans, corrigan beans, chickpeas (garbanzo beans), and lentils (Note: beans and peas count as both a protein and a vegetable)  · Peanuts, almonds, walnuts, sesame seeds, and sunflower  seeds, as well as foods made from these (such as peanut butter or tahini)  · Eggs and foods made with eggs (such as quiche or frittata)  What Makes Meat and Beans Less Healthy?  · Fatty meat is not healthy. Before you cook meat, trim off all the fat you can see. Chicken and turkey skin is also high in fat, and should be removed before cooking.  · Breading and frying make food less healthy. This includes dishes like fried chicken, fried fish, and refried beans.  · Sausage and lunch meats tend to be high in fat and salt. Buy low-fat, low-sodium versions.  One Small Change  Make a meal that includes a non-meat source of protein (such as tofu, lentils, or any other food listed above). Have a better idea? Write it here:  _____________________________________________________________  ©  2474-3410 [employer]. [address] [place] [address], [place] [address]. All rights reserved. This information is not intended as a substitute for professional medical care. Always follow your healthcare professional's instructions.

## 2018-06-14 ENCOUNTER — HOSPITAL ENCOUNTER (OUTPATIENT)
Dept: CARDIOLOGY | Facility: HOSPITAL | Age: 53
Discharge: HOME | End: 2018-06-14
Attending: PODIATRIST
Payer: COMMERCIAL

## 2018-06-14 DIAGNOSIS — E11.49 TYPE II DIABETES MELLITUS WITH NEUROLOGICAL MANIFESTATIONS: ICD-10-CM

## 2018-06-14 DIAGNOSIS — L97.522 DIABETIC ULCER OF TOE OF LEFT FOOT ASSOCIATED WITH DIABETES MELLITUS DUE TO UNDERLYING CONDITION, WITH FAT LAYER EXPOSED: ICD-10-CM

## 2018-06-14 DIAGNOSIS — E08.621 DIABETIC ULCER OF TOE OF LEFT FOOT ASSOCIATED WITH DIABETES MELLITUS DUE TO UNDERLYING CONDITION, WITH FAT LAYER EXPOSED: ICD-10-CM

## 2018-06-14 PROCEDURE — 93922 UPR/L XTREMITY ART 2 LEVELS: CPT

## 2018-06-14 PROCEDURE — 93922 UPR/L XTREMITY ART 2 LEVELS: CPT | Mod: 26,,, | Performed by: SURGERY

## 2018-06-14 RX ORDER — DULAGLUTIDE 0.75 MG/.5ML
INJECTION, SOLUTION SUBCUTANEOUS
Qty: 2 ML | Refills: 2 | Status: SHIPPED | OUTPATIENT
Start: 2018-06-14 | End: 2018-08-31 | Stop reason: SDUPTHER

## 2018-06-15 ENCOUNTER — PATIENT MESSAGE (OUTPATIENT)
Dept: PODIATRY | Facility: CLINIC | Age: 53
End: 2018-06-15

## 2018-06-20 ENCOUNTER — OFFICE VISIT (OUTPATIENT)
Dept: PODIATRY | Facility: CLINIC | Age: 53
End: 2018-06-20
Payer: COMMERCIAL

## 2018-06-20 VITALS
SYSTOLIC BLOOD PRESSURE: 174 MMHG | DIASTOLIC BLOOD PRESSURE: 98 MMHG | HEIGHT: 75 IN | BODY MASS INDEX: 31.21 KG/M2 | WEIGHT: 251 LBS

## 2018-06-20 DIAGNOSIS — I10 ESSENTIAL HYPERTENSION: ICD-10-CM

## 2018-06-20 DIAGNOSIS — E11.49 TYPE II DIABETES MELLITUS WITH NEUROLOGICAL MANIFESTATIONS: Primary | ICD-10-CM

## 2018-06-20 DIAGNOSIS — L97.522 DIABETIC ULCER OF TOE OF LEFT FOOT ASSOCIATED WITH DIABETES MELLITUS DUE TO UNDERLYING CONDITION, WITH FAT LAYER EXPOSED: ICD-10-CM

## 2018-06-20 DIAGNOSIS — E08.621 DIABETIC ULCER OF TOE OF LEFT FOOT ASSOCIATED WITH DIABETES MELLITUS DUE TO UNDERLYING CONDITION, WITH FAT LAYER EXPOSED: ICD-10-CM

## 2018-06-20 LAB — VASCULAR ANKLE BRACHIAL INDEX (ABI) RIGHT: 1.16 (ref 0.9–1.2)

## 2018-06-20 PROCEDURE — 3044F HG A1C LEVEL LT 7.0%: CPT | Mod: CPTII,S$GLB,, | Performed by: PODIATRIST

## 2018-06-20 PROCEDURE — 3077F SYST BP >= 140 MM HG: CPT | Mod: CPTII,S$GLB,, | Performed by: PODIATRIST

## 2018-06-20 PROCEDURE — 3008F BODY MASS INDEX DOCD: CPT | Mod: CPTII,S$GLB,, | Performed by: PODIATRIST

## 2018-06-20 PROCEDURE — 99214 OFFICE O/P EST MOD 30 MIN: CPT | Mod: S$GLB,,, | Performed by: PODIATRIST

## 2018-06-20 PROCEDURE — 3080F DIAST BP >= 90 MM HG: CPT | Mod: CPTII,S$GLB,, | Performed by: PODIATRIST

## 2018-06-20 PROCEDURE — 99999 PR PBB SHADOW E&M-EST. PATIENT-LVL III: CPT | Mod: PBBFAC,,, | Performed by: PODIATRIST

## 2018-06-20 RX ORDER — LISINOPRIL 20 MG/1
TABLET ORAL
Qty: 90 TABLET | Refills: 0 | Status: SHIPPED | OUTPATIENT
Start: 2018-06-20 | End: 2018-09-19 | Stop reason: SDUPTHER

## 2018-06-20 RX ORDER — GLYBURIDE 5 MG/1
TABLET ORAL
Qty: 180 TABLET | Refills: 0 | Status: SHIPPED | OUTPATIENT
Start: 2018-06-20 | End: 2019-05-31

## 2018-06-20 RX ORDER — DILTIAZEM HYDROCHLORIDE 240 MG/1
CAPSULE, COATED, EXTENDED RELEASE ORAL
Qty: 90 CAPSULE | Refills: 0 | Status: SHIPPED | OUTPATIENT
Start: 2018-06-20 | End: 2018-07-19 | Stop reason: SDUPTHER

## 2018-06-20 NOTE — PATIENT INSTRUCTIONS
Please keep football dressing clean, dry, and intact.  If dressing gets wet please contact our office.    Wear special shoe every time foot is placed on the floor.    Elevate affected foot as much as possible    Stay hydrated.      Nutrition and MyPlate: Protein Foods  This group includes foods that are high in protein. Protein helps the body build new cells and keeps tissues healthy. Most Americans get enough protein without even trying. It can be harder for vegetarians, but plenty of non-meat foods are rich in protein, too. Its best to get protein from a variety of sources.    Nutrient-Rich Choices  Theres a lot more to this food group than just meat and beans. It also includes nuts, seeds, and eggs. There are all sorts of nutrient-rich choices:  · Chicken and turkey with the skin removed  · Fish and shellfish  · Lean beef, pork, or lamb (without visible fat)  · Soy products, such as tofu, soybeans (edamame), tempeh, or soymilk  · Black beans, kidney beans, corrigan beans, chickpeas (garbanzo beans), and lentils (Note: beans and peas count as both a protein and a vegetable)  · Peanuts, almonds, walnuts, sesame seeds, and sunflower  seeds, as well as foods made from these (such as peanut butter or tahini)  · Eggs and foods made with eggs (such as quiche or frittata)  What Makes Meat and Beans Less Healthy?  · Fatty meat is not healthy. Before you cook meat, trim off all the fat you can see. Chicken and turkey skin is also high in fat, and should be removed before cooking.  · Breading and frying make food less healthy. This includes dishes like fried chicken, fried fish, and refried beans.  · Sausage and lunch meats tend to be high in fat and salt. Buy low-fat, low-sodium versions.  One Small Change  Make a meal that includes a non-meat source of protein (such as tofu, lentils, or any other food listed above). Have a better idea? Write it here:  _____________________________________________________________  ©  2395-3120 [employer]. [address] [place] [address], [place] [address]. All rights reserved. This information is not intended as a substitute for professional medical care. Always follow your healthcare professional's instructions.
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
## 2018-06-20 NOTE — TELEPHONE ENCOUNTER
Lisinopril last refill 04/30/2018, Glyburide last refill. 04/30/2018,Cartia last refill 04/30/2018. LOV 04/25/2018

## 2018-06-26 ENCOUNTER — OFFICE VISIT (OUTPATIENT)
Dept: PODIATRY | Facility: CLINIC | Age: 53
End: 2018-06-26
Payer: COMMERCIAL

## 2018-06-26 VITALS
SYSTOLIC BLOOD PRESSURE: 160 MMHG | HEIGHT: 75 IN | DIASTOLIC BLOOD PRESSURE: 98 MMHG | BODY MASS INDEX: 31.21 KG/M2 | WEIGHT: 251 LBS

## 2018-06-26 DIAGNOSIS — E08.621 DIABETIC ULCER OF TOE OF LEFT FOOT ASSOCIATED WITH DIABETES MELLITUS DUE TO UNDERLYING CONDITION, WITH FAT LAYER EXPOSED: ICD-10-CM

## 2018-06-26 DIAGNOSIS — L97.522 DIABETIC ULCER OF TOE OF LEFT FOOT ASSOCIATED WITH DIABETES MELLITUS DUE TO UNDERLYING CONDITION, WITH FAT LAYER EXPOSED: ICD-10-CM

## 2018-06-26 DIAGNOSIS — E11.49 TYPE II DIABETES MELLITUS WITH NEUROLOGICAL MANIFESTATIONS: Primary | ICD-10-CM

## 2018-06-26 PROCEDURE — 3080F DIAST BP >= 90 MM HG: CPT | Mod: CPTII,S$GLB,, | Performed by: PODIATRIST

## 2018-06-26 PROCEDURE — 99999 PR PBB SHADOW E&M-EST. PATIENT-LVL III: CPT | Mod: PBBFAC,,, | Performed by: PODIATRIST

## 2018-06-26 PROCEDURE — 3008F BODY MASS INDEX DOCD: CPT | Mod: CPTII,S$GLB,, | Performed by: PODIATRIST

## 2018-06-26 PROCEDURE — 99213 OFFICE O/P EST LOW 20 MIN: CPT | Mod: S$GLB,,, | Performed by: PODIATRIST

## 2018-06-26 PROCEDURE — 3044F HG A1C LEVEL LT 7.0%: CPT | Mod: CPTII,S$GLB,, | Performed by: PODIATRIST

## 2018-06-26 PROCEDURE — 3077F SYST BP >= 140 MM HG: CPT | Mod: CPTII,S$GLB,, | Performed by: PODIATRIST
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
## 2018-07-03 ENCOUNTER — OFFICE VISIT (OUTPATIENT)
Dept: PODIATRY | Facility: CLINIC | Age: 53
End: 2018-07-03
Payer: COMMERCIAL

## 2018-07-03 VITALS
DIASTOLIC BLOOD PRESSURE: 82 MMHG | WEIGHT: 251 LBS | SYSTOLIC BLOOD PRESSURE: 136 MMHG | BODY MASS INDEX: 31.21 KG/M2 | HEIGHT: 75 IN

## 2018-07-03 DIAGNOSIS — E08.621 DIABETIC ULCER OF TOE OF LEFT FOOT ASSOCIATED WITH DIABETES MELLITUS DUE TO UNDERLYING CONDITION, WITH FAT LAYER EXPOSED: ICD-10-CM

## 2018-07-03 DIAGNOSIS — E11.49 TYPE II DIABETES MELLITUS WITH NEUROLOGICAL MANIFESTATIONS: Primary | ICD-10-CM

## 2018-07-03 DIAGNOSIS — L97.522 DIABETIC ULCER OF TOE OF LEFT FOOT ASSOCIATED WITH DIABETES MELLITUS DUE TO UNDERLYING CONDITION, WITH FAT LAYER EXPOSED: ICD-10-CM

## 2018-07-03 PROCEDURE — 99999 PR PBB SHADOW E&M-EST. PATIENT-LVL III: CPT | Mod: PBBFAC,,, | Performed by: PODIATRIST

## 2018-07-03 PROCEDURE — 3075F SYST BP GE 130 - 139MM HG: CPT | Mod: CPTII,S$GLB,, | Performed by: PODIATRIST

## 2018-07-03 PROCEDURE — 99214 OFFICE O/P EST MOD 30 MIN: CPT | Mod: S$GLB,,, | Performed by: PODIATRIST

## 2018-07-03 PROCEDURE — 3008F BODY MASS INDEX DOCD: CPT | Mod: CPTII,S$GLB,, | Performed by: PODIATRIST

## 2018-07-03 PROCEDURE — 3079F DIAST BP 80-89 MM HG: CPT | Mod: CPTII,S$GLB,, | Performed by: PODIATRIST

## 2018-07-03 PROCEDURE — 3044F HG A1C LEVEL LT 7.0%: CPT | Mod: CPTII,S$GLB,, | Performed by: PODIATRIST
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
## 2018-07-03 NOTE — PATIENT INSTRUCTIONS
Wound care Instructions:       ¨ Clean the toe separate from your body with warm running water and antibacterial soap such as dial soap  ¨ Clean any remaining crust away with soap and water using a cotton-tipped applicator.  ¨ DRY COMPLETELY  ¨ Apply betadine to the toe.  ¨ Cover with a breathable bandage until there is no more drainage or open flesh.  · Change the dressing daily, or whenever it becomes wet or dirty.  · If you were prescribed antibiotics, take them as directed until they are all gone.  · Wear comfortable shoes with a lot of toe room, or open-toe sandals, while your toe is healing.  · You may use acetaminophen or ibuprofen to control pain, unless another medicine was prescribed. If you have chronic liver or kidney disease or ever had a stomach ulcer or GI bleeding, talk with your doctor before using these medicines.      When to seek medical advice  Call your health care provider right away if any of the following occur:  · Increasing redness, pain or swelling of the toe  · Red streaks in the skin leading away from the wound  · Continued pus or fluid drainage for more than 24 hours  · Fever of 100.4º F (38º C) or higher, or as directed by your health care provider

## 2018-07-17 ENCOUNTER — OFFICE VISIT (OUTPATIENT)
Dept: PODIATRY | Facility: CLINIC | Age: 53
End: 2018-07-17
Payer: COMMERCIAL

## 2018-07-17 VITALS
SYSTOLIC BLOOD PRESSURE: 126 MMHG | DIASTOLIC BLOOD PRESSURE: 74 MMHG | WEIGHT: 251 LBS | BODY MASS INDEX: 31.21 KG/M2 | HEIGHT: 75 IN

## 2018-07-17 DIAGNOSIS — E11.49 TYPE II DIABETES MELLITUS WITH NEUROLOGICAL MANIFESTATIONS: Primary | ICD-10-CM

## 2018-07-17 DIAGNOSIS — E08.621 DIABETIC ULCER OF TOE OF LEFT FOOT ASSOCIATED WITH DIABETES MELLITUS DUE TO UNDERLYING CONDITION, WITH FAT LAYER EXPOSED: ICD-10-CM

## 2018-07-17 DIAGNOSIS — L97.522 DIABETIC ULCER OF TOE OF LEFT FOOT ASSOCIATED WITH DIABETES MELLITUS DUE TO UNDERLYING CONDITION, WITH FAT LAYER EXPOSED: ICD-10-CM

## 2018-07-17 DIAGNOSIS — R60.0 BILATERAL LOWER EXTREMITY EDEMA: ICD-10-CM

## 2018-07-17 DIAGNOSIS — Z86.31 HISTORY OF DIABETIC ULCER OF FOOT: ICD-10-CM

## 2018-07-17 PROCEDURE — 99999 PR PBB SHADOW E&M-EST. PATIENT-LVL III: CPT | Mod: PBBFAC,,, | Performed by: PODIATRIST

## 2018-07-17 PROCEDURE — 3044F HG A1C LEVEL LT 7.0%: CPT | Mod: CPTII,S$GLB,, | Performed by: PODIATRIST

## 2018-07-17 PROCEDURE — 3078F DIAST BP <80 MM HG: CPT | Mod: CPTII,S$GLB,, | Performed by: PODIATRIST

## 2018-07-17 PROCEDURE — 99213 OFFICE O/P EST LOW 20 MIN: CPT | Mod: S$GLB,,, | Performed by: PODIATRIST

## 2018-07-17 PROCEDURE — 3008F BODY MASS INDEX DOCD: CPT | Mod: CPTII,S$GLB,, | Performed by: PODIATRIST

## 2018-07-17 PROCEDURE — 3074F SYST BP LT 130 MM HG: CPT | Mod: CPTII,S$GLB,, | Performed by: PODIATRIST

## 2018-07-18 PROBLEM — Z86.31 HISTORY OF DIABETIC ULCER OF FOOT: Status: ACTIVE | Noted: 2018-07-18
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
## 2018-07-19 DIAGNOSIS — I10 ESSENTIAL HYPERTENSION: ICD-10-CM

## 2018-07-20 RX ORDER — DILTIAZEM HYDROCHLORIDE 240 MG/1
CAPSULE, COATED, EXTENDED RELEASE ORAL
Qty: 90 CAPSULE | Refills: 0 | Status: SHIPPED | OUTPATIENT
Start: 2018-07-20 | End: 2018-09-19 | Stop reason: SDUPTHER

## 2018-08-01 ENCOUNTER — OFFICE VISIT (OUTPATIENT)
Dept: PODIATRY | Facility: CLINIC | Age: 53
End: 2018-08-01
Payer: COMMERCIAL

## 2018-08-01 VITALS — HEIGHT: 75 IN | BODY MASS INDEX: 31.21 KG/M2 | WEIGHT: 251 LBS

## 2018-08-01 DIAGNOSIS — E08.621 DIABETIC ULCER OF TOE OF LEFT FOOT ASSOCIATED WITH DIABETES MELLITUS DUE TO UNDERLYING CONDITION, WITH FAT LAYER EXPOSED: ICD-10-CM

## 2018-08-01 DIAGNOSIS — L97.522 DIABETIC ULCER OF TOE OF LEFT FOOT ASSOCIATED WITH DIABETES MELLITUS DUE TO UNDERLYING CONDITION, WITH FAT LAYER EXPOSED: ICD-10-CM

## 2018-08-01 DIAGNOSIS — E11.49 TYPE II DIABETES MELLITUS WITH NEUROLOGICAL MANIFESTATIONS: Primary | ICD-10-CM

## 2018-08-01 PROCEDURE — 3044F HG A1C LEVEL LT 7.0%: CPT | Mod: CPTII,S$GLB,, | Performed by: PODIATRIST

## 2018-08-01 PROCEDURE — 99999 PR PBB SHADOW E&M-EST. PATIENT-LVL III: CPT | Mod: PBBFAC,,, | Performed by: PODIATRIST

## 2018-08-01 PROCEDURE — 3008F BODY MASS INDEX DOCD: CPT | Mod: CPTII,S$GLB,, | Performed by: PODIATRIST

## 2018-08-01 PROCEDURE — 99213 OFFICE O/P EST LOW 20 MIN: CPT | Mod: S$GLB,,, | Performed by: PODIATRIST
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
## 2018-08-13 ENCOUNTER — OFFICE VISIT (OUTPATIENT)
Dept: PODIATRY | Facility: CLINIC | Age: 53
End: 2018-08-13
Payer: COMMERCIAL

## 2018-08-13 VITALS
BODY MASS INDEX: 31.21 KG/M2 | DIASTOLIC BLOOD PRESSURE: 96 MMHG | HEIGHT: 75 IN | WEIGHT: 251 LBS | SYSTOLIC BLOOD PRESSURE: 130 MMHG

## 2018-08-13 DIAGNOSIS — Z87.2 HEALED ULCER OF LEFT FOOT ON EXAMINATION: ICD-10-CM

## 2018-08-13 DIAGNOSIS — M20.10 HALLUX ABDUCTO VALGUS, UNSPECIFIED LATERALITY: ICD-10-CM

## 2018-08-13 DIAGNOSIS — M20.41 HAMMER TOES OF BOTH FEET: ICD-10-CM

## 2018-08-13 DIAGNOSIS — E11.49 TYPE II DIABETES MELLITUS WITH NEUROLOGICAL MANIFESTATIONS: Primary | ICD-10-CM

## 2018-08-13 DIAGNOSIS — M20.42 HAMMER TOES OF BOTH FEET: ICD-10-CM

## 2018-08-13 PROCEDURE — 3075F SYST BP GE 130 - 139MM HG: CPT | Mod: CPTII,S$GLB,, | Performed by: PODIATRIST

## 2018-08-13 PROCEDURE — 3008F BODY MASS INDEX DOCD: CPT | Mod: CPTII,S$GLB,, | Performed by: PODIATRIST

## 2018-08-13 PROCEDURE — 99213 OFFICE O/P EST LOW 20 MIN: CPT | Mod: S$GLB,,, | Performed by: PODIATRIST

## 2018-08-13 PROCEDURE — 3044F HG A1C LEVEL LT 7.0%: CPT | Mod: CPTII,S$GLB,, | Performed by: PODIATRIST

## 2018-08-13 PROCEDURE — 3080F DIAST BP >= 90 MM HG: CPT | Mod: CPTII,S$GLB,, | Performed by: PODIATRIST

## 2018-08-13 PROCEDURE — 99999 PR PBB SHADOW E&M-EST. PATIENT-LVL III: CPT | Mod: PBBFAC,,, | Performed by: PODIATRIST
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
## 2018-08-27 ENCOUNTER — OFFICE VISIT (OUTPATIENT)
Dept: PODIATRY | Facility: CLINIC | Age: 53
End: 2018-08-27
Payer: COMMERCIAL

## 2018-08-27 VITALS
DIASTOLIC BLOOD PRESSURE: 90 MMHG | BODY MASS INDEX: 31.21 KG/M2 | HEIGHT: 75 IN | SYSTOLIC BLOOD PRESSURE: 144 MMHG | WEIGHT: 251 LBS

## 2018-08-27 DIAGNOSIS — L84 PRE-ULCERATIVE CALLUSES: ICD-10-CM

## 2018-08-27 DIAGNOSIS — E11.49 TYPE II DIABETES MELLITUS WITH NEUROLOGICAL MANIFESTATIONS: Primary | ICD-10-CM

## 2018-08-27 DIAGNOSIS — Z87.2 HEALED ULCER OF LEFT FOOT ON EXAMINATION: ICD-10-CM

## 2018-08-27 DIAGNOSIS — M20.41 HAMMER TOES OF BOTH FEET: ICD-10-CM

## 2018-08-27 DIAGNOSIS — M20.10 HALLUX ABDUCTO VALGUS, UNSPECIFIED LATERALITY: ICD-10-CM

## 2018-08-27 DIAGNOSIS — L90.9 PLANTAR FAT PAD ATROPHY: ICD-10-CM

## 2018-08-27 DIAGNOSIS — M20.42 HAMMER TOES OF BOTH FEET: ICD-10-CM

## 2018-08-27 PROCEDURE — 99213 OFFICE O/P EST LOW 20 MIN: CPT | Mod: S$GLB,,, | Performed by: PODIATRIST

## 2018-08-27 PROCEDURE — 3008F BODY MASS INDEX DOCD: CPT | Mod: CPTII,S$GLB,, | Performed by: PODIATRIST

## 2018-08-27 PROCEDURE — 99999 PR PBB SHADOW E&M-EST. PATIENT-LVL II: CPT | Mod: PBBFAC,,, | Performed by: PODIATRIST

## 2018-08-27 PROCEDURE — 3080F DIAST BP >= 90 MM HG: CPT | Mod: CPTII,S$GLB,, | Performed by: PODIATRIST

## 2018-08-27 PROCEDURE — 3044F HG A1C LEVEL LT 7.0%: CPT | Mod: CPTII,S$GLB,, | Performed by: PODIATRIST

## 2018-08-27 PROCEDURE — 3077F SYST BP >= 140 MM HG: CPT | Mod: CPTII,S$GLB,, | Performed by: PODIATRIST
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
## 2018-08-30 DIAGNOSIS — E11.9 TYPE 2 DIABETES MELLITUS WITHOUT COMPLICATION: ICD-10-CM

## 2018-09-04 ENCOUNTER — PATIENT MESSAGE (OUTPATIENT)
Dept: PODIATRY | Facility: CLINIC | Age: 53
End: 2018-09-04

## 2018-09-04 ENCOUNTER — TELEPHONE (OUTPATIENT)
Dept: FAMILY MEDICINE | Facility: CLINIC | Age: 53
End: 2018-09-04

## 2018-09-04 RX ORDER — DULAGLUTIDE 0.75 MG/.5ML
INJECTION, SOLUTION SUBCUTANEOUS
Qty: 2 ML | Refills: 0 | Status: SHIPPED | OUTPATIENT
Start: 2018-09-04 | End: 2018-10-09 | Stop reason: SDUPTHER

## 2018-09-06 ENCOUNTER — OFFICE VISIT (OUTPATIENT)
Dept: PODIATRY | Facility: CLINIC | Age: 53
End: 2018-09-06
Payer: COMMERCIAL

## 2018-09-06 VITALS
WEIGHT: 251 LBS | HEIGHT: 75 IN | BODY MASS INDEX: 31.21 KG/M2 | SYSTOLIC BLOOD PRESSURE: 130 MMHG | DIASTOLIC BLOOD PRESSURE: 90 MMHG

## 2018-09-06 DIAGNOSIS — M20.10 HALLUX ABDUCTO VALGUS, UNSPECIFIED LATERALITY: ICD-10-CM

## 2018-09-06 DIAGNOSIS — M20.41 HAMMER TOES OF BOTH FEET: ICD-10-CM

## 2018-09-06 DIAGNOSIS — L90.9 PLANTAR FAT PAD ATROPHY: ICD-10-CM

## 2018-09-06 DIAGNOSIS — M20.42 HAMMER TOES OF BOTH FEET: ICD-10-CM

## 2018-09-06 DIAGNOSIS — E11.49 TYPE II DIABETES MELLITUS WITH NEUROLOGICAL MANIFESTATIONS: Primary | ICD-10-CM

## 2018-09-06 DIAGNOSIS — L84 PRE-ULCERATIVE CALLUSES: ICD-10-CM

## 2018-09-06 DIAGNOSIS — Z87.2 HEALED ULCER OF LEFT FOOT ON EXAMINATION: ICD-10-CM

## 2018-09-06 PROCEDURE — 99999 PR PBB SHADOW E&M-EST. PATIENT-LVL III: CPT | Mod: PBBFAC,,, | Performed by: PODIATRIST

## 2018-09-06 PROCEDURE — 3008F BODY MASS INDEX DOCD: CPT | Mod: CPTII,S$GLB,, | Performed by: PODIATRIST

## 2018-09-06 PROCEDURE — 3044F HG A1C LEVEL LT 7.0%: CPT | Mod: CPTII,S$GLB,, | Performed by: PODIATRIST

## 2018-09-06 PROCEDURE — 3080F DIAST BP >= 90 MM HG: CPT | Mod: CPTII,S$GLB,, | Performed by: PODIATRIST

## 2018-09-06 PROCEDURE — 3075F SYST BP GE 130 - 139MM HG: CPT | Mod: CPTII,S$GLB,, | Performed by: PODIATRIST

## 2018-09-06 PROCEDURE — 99213 OFFICE O/P EST LOW 20 MIN: CPT | Mod: S$GLB,,, | Performed by: PODIATRIST
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
## 2018-09-06 NOTE — PATIENT INSTRUCTIONS
Recommend lotions: eucerin, eucerin for diabetics, aquaphor, A&D ointment, gold bond for diabetics, sween, [place]'s Bees all purpose baby ointment,  urea 40 with aloe (found on amazon.com)    Shoe recommendations: (try 6pm.com, zappos.com , nordstromrack.Teepix, or shoes.Teepix for discounted prices) you can visit [employer]SW shoes in [place]  or [facility] in the [facility] (there are also several shoe brand outlets in the [facility])    Asics (GT 2000 or gel foundations), new balance stability type shoes, saucony (stabil c3),  [place] (GTS or Beast or transcend), vionic, propet (tennis shoe)    sofft brand, clarks, crocs, aerosoles, naturalizers, SAS, ecco, born, pauline buck, rockports (dress shoes)    Vionic, burkenstocks, fitflops, propet (sandals)  Nike comfort thong sandals, crocs, propet (house shoes)    Nail Home remedy:  Vicks Vapor rub to nails for easier managability    Occasional soaks for 15-20 mins in luke warm water with 1 cup of listerine and 1 cup of apple cider vinegar are ok You may add several drops of oil of oregano or tea tree oil as well        Diabetes: Inspecting Your Feet  Diabetes increases your chances of developing foot problems. So inspect your feet every day. This helps you find small skin irritations before they become serious infections. If you have trouble seeing the bottoms of your feet, use a mirror or ask a family member or friend to help.     Pressure spots on the bottom of the foot are common areas where problems develop.   How to check your feet  Below are tips to help you look for foot problems. Try to check your feet at the same time each day, such as when you get out of bed in the morning:  · Check the top of each foot. The tops of toes, back of the heel, and outer edge of the foot can get a lot of rubbing from poor-fitting shoes.  · Check the bottom of each foot. Daily wear and tear often leads to problems at pressure spots.  · Check the toes and nails. Fungal infections often occur  between toes. Toenail problems can also be a sign of fungal infections or lead to breaks in the skin.  · Check your shoes, too. Loose objects inside a shoe can injure the foot. Use your hand to feel inside your shoes for things like james, loose stitching, or rough areas that could irritate your skin.  Warning signs  Look for any color changes in the foot. Redness with streaks can signal a severe infection, which needs immediate medical attention. Tell your doctor right away if you have any of these problems:  · Swelling, sometimes with color changes, may be a sign of poor blood flow or infection. Symptoms include tenderness and an increase in the size of your foot.  · Warm or hot areas on your feet may be signs of infection. A foot that is cold may not be getting enough blood.  · Sensations such as burning, tingling, or pins and needles can be signs of a problem. Also check for areas that may be numb.  · Hot spots are caused by friction or pressure. Look for hot spots in areas that get a lot of rubbing. Hot spots can turn into blisters, calluses, or sores.  · Cracks and sores are caused by dry or irritated skin. They are a sign that the skin is breaking down, which can lead to infection.  · Toenail problems to watch for include nails growing into the skin (ingrown toenail) and causing redness or pain. Thick, yellow, or discolored nails can signal a fungal infection.  · Drainage and odor can develop from untreated sores and ulcers. Call your doctor right away if you notice white or yellow drainage, bleeding, or unpleasant odor.   © 6876-9715 [employer]. [address] [place] [address]. All rights reserved. This information is not intended as a substitute for professional medical care. Always follow your healthcare professional's instructions.        Step-by-Step:  Inspecting Your Feet (Diabetes)    Date Last Reviewed: 10/1/2016  © 0461-5503 [employer]. [address] [place] [address]  Road, [place] [address]. All rights reserved. This information is not intended as a substitute for professional medical care. Always follow your healthcare professional's instructions.

## 2018-09-17 DIAGNOSIS — I10 ESSENTIAL HYPERTENSION: ICD-10-CM

## 2018-09-19 DIAGNOSIS — I10 ESSENTIAL HYPERTENSION: ICD-10-CM

## 2018-09-19 RX ORDER — CHLORTHALIDONE 25 MG/1
TABLET ORAL
Qty: 90 TABLET | Refills: 0 | Status: SHIPPED | OUTPATIENT
Start: 2018-09-19 | End: 2018-12-02 | Stop reason: SDUPTHER

## 2018-09-20 RX ORDER — LISINOPRIL 20 MG/1
TABLET ORAL
Qty: 90 TABLET | Refills: 0 | Status: SHIPPED | OUTPATIENT
Start: 2018-09-20 | End: 2018-12-06 | Stop reason: SDUPTHER

## 2018-09-20 RX ORDER — DILTIAZEM HYDROCHLORIDE 240 MG/1
CAPSULE, COATED, EXTENDED RELEASE ORAL
Qty: 90 CAPSULE | Refills: 0 | Status: SHIPPED | OUTPATIENT
Start: 2018-09-20 | End: 2018-12-06 | Stop reason: SDUPTHER

## 2018-09-21 ENCOUNTER — TELEPHONE (OUTPATIENT)
Dept: FAMILY MEDICINE | Facility: CLINIC | Age: 53
End: 2018-09-21

## 2018-09-21 DIAGNOSIS — N18.9 CHRONIC KIDNEY DISEASE, UNSPECIFIED CKD STAGE: ICD-10-CM

## 2018-09-28 RX ORDER — DULAGLUTIDE 0.75 MG/.5ML
INJECTION, SOLUTION SUBCUTANEOUS
Qty: 2 ML | Refills: 0 | Status: CANCELLED | OUTPATIENT
Start: 2018-09-28

## 2018-10-03 NOTE — TELEPHONE ENCOUNTER
Spoke with patient. Notified of prior authorization has been attempted and can take up to 72 hours before we receive a response. Verbalized understanding.

## 2018-10-03 NOTE — TELEPHONE ENCOUNTER
----- Message from Sam Oconnell sent at 10/3/2018 12:00 PM CDT -----  Contact: JOMAR [phone]  Patient is calling to check on the status of his Trulicity. The patient was told the pharmacy needed an authorization from Dr. Lombard.

## 2018-10-05 NOTE — TELEPHONE ENCOUNTER
Spoke with patient. Notified of attempt of prior authorization and it came back noting patient does not need a prior authorization due to it being covered under his insurance. Verbalized understanding. States he will contact the pharmacy.

## 2018-10-05 NOTE — TELEPHONE ENCOUNTER
----- Message from Erica Wade sent at 10/5/2018  4:18 PM CDT -----  Contact: pt  Can the clinic reply in [facility]: no      Please refill the medication(s) listed below. The patient can be reached at this phone number (_[phone]____) once it is called into the pharmacy.    TRULICITY 0.75 mg/0.5 mL PnIj - needs PA  Medication #      Medication #2      Preferred Pharmacy:[facility] on [address]

## 2018-10-09 NOTE — TELEPHONE ENCOUNTER
----- Message from Hima Cornejo sent at 10/9/2018 12:51 PM CDT -----  Contact: self  Pt states [facility] keeps telling him they don't have the TRULICITY 0.75 mg/0.5 mL PnIj refill. Please resubmit.    Catalino [phone] (home)   .  [facility] on [address]

## 2018-10-17 ENCOUNTER — OFFICE VISIT (OUTPATIENT)
Dept: PODIATRY | Facility: CLINIC | Age: 53
End: 2018-10-17
Payer: COMMERCIAL

## 2018-10-17 VITALS — HEIGHT: 75 IN | WEIGHT: 251 LBS | BODY MASS INDEX: 31.21 KG/M2

## 2018-10-17 DIAGNOSIS — E11.49 TYPE II DIABETES MELLITUS WITH NEUROLOGICAL MANIFESTATIONS: Primary | ICD-10-CM

## 2018-10-17 DIAGNOSIS — L97.512 DIABETIC ULCER OF OTHER PART OF RIGHT FOOT ASSOCIATED WITH TYPE 2 DIABETES MELLITUS, WITH FAT LAYER EXPOSED: ICD-10-CM

## 2018-10-17 DIAGNOSIS — E11.621 DIABETIC ULCER OF OTHER PART OF RIGHT FOOT ASSOCIATED WITH TYPE 2 DIABETES MELLITUS, WITH FAT LAYER EXPOSED: ICD-10-CM

## 2018-10-17 DIAGNOSIS — L97.522 DIABETIC ULCER OF TOE OF LEFT FOOT ASSOCIATED WITH DIABETES MELLITUS DUE TO UNDERLYING CONDITION, WITH FAT LAYER EXPOSED: ICD-10-CM

## 2018-10-17 DIAGNOSIS — E08.621 DIABETIC ULCER OF TOE OF LEFT FOOT ASSOCIATED WITH DIABETES MELLITUS DUE TO UNDERLYING CONDITION, WITH FAT LAYER EXPOSED: ICD-10-CM

## 2018-10-17 PROCEDURE — 99214 OFFICE O/P EST MOD 30 MIN: CPT | Mod: S$GLB,,, | Performed by: PODIATRIST

## 2018-10-17 PROCEDURE — 87077 CULTURE AEROBIC IDENTIFY: CPT

## 2018-10-17 PROCEDURE — 87186 SC STD MICRODIL/AGAR DIL: CPT

## 2018-10-17 PROCEDURE — 87075 CULTR BACTERIA EXCEPT BLOOD: CPT

## 2018-10-17 PROCEDURE — 99999 PR PBB SHADOW E&M-EST. PATIENT-LVL III: CPT | Mod: PBBFAC,,, | Performed by: PODIATRIST

## 2018-10-17 PROCEDURE — 87070 CULTURE OTHR SPECIMN AEROBIC: CPT

## 2018-10-17 PROCEDURE — 3008F BODY MASS INDEX DOCD: CPT | Mod: CPTII,S$GLB,, | Performed by: PODIATRIST

## 2018-10-17 PROCEDURE — 3044F HG A1C LEVEL LT 7.0%: CPT | Mod: CPTII,S$GLB,, | Performed by: PODIATRIST

## 2018-10-19 ENCOUNTER — TELEPHONE (OUTPATIENT)
Dept: PODIATRY | Facility: CLINIC | Age: 53
End: 2018-10-19

## 2018-10-19 LAB — BACTERIA SPEC AEROBE CULT: NORMAL

## 2018-10-19 RX ORDER — SULFAMETHOXAZOLE AND TRIMETHOPRIM 800; 160 MG/1; MG/1
1 TABLET ORAL 2 TIMES DAILY
Qty: 20 TABLET | Refills: 0 | Status: SHIPPED | OUTPATIENT
Start: 2018-10-19 | End: 2018-10-29

## 2018-10-19 NOTE — TELEPHONE ENCOUNTER
Spoke with patient and advised of results and recommendations below.     Patient verbalized understanding and will start medication and stay well hydrated while on medication

## 2018-10-19 NOTE — TELEPHONE ENCOUNTER
----- Message from Aviva Martinez DPM sent at 10/19/2018  2:03 PM CDT -----  Culture result positive for bacteria.  Rx for Bactrim DS 2 times per day for 10 days sent to his pharmacy.  Please inform him that he needs to be well hydrated while in this medication
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
## 2018-10-22 LAB — BACTERIA SPEC ANAEROBE CULT: NORMAL

## 2018-10-24 ENCOUNTER — OFFICE VISIT (OUTPATIENT)
Dept: PODIATRY | Facility: CLINIC | Age: 53
End: 2018-10-24
Payer: COMMERCIAL

## 2018-10-24 VITALS
SYSTOLIC BLOOD PRESSURE: 144 MMHG | HEIGHT: 75 IN | BODY MASS INDEX: 31.22 KG/M2 | WEIGHT: 251.13 LBS | DIASTOLIC BLOOD PRESSURE: 82 MMHG

## 2018-10-24 DIAGNOSIS — L97.512 DIABETIC ULCER OF OTHER PART OF RIGHT FOOT ASSOCIATED WITH TYPE 2 DIABETES MELLITUS, WITH FAT LAYER EXPOSED: ICD-10-CM

## 2018-10-24 DIAGNOSIS — E11.621 DIABETIC ULCER OF OTHER PART OF RIGHT FOOT ASSOCIATED WITH TYPE 2 DIABETES MELLITUS, WITH FAT LAYER EXPOSED: ICD-10-CM

## 2018-10-24 DIAGNOSIS — L97.522 DIABETIC ULCER OF TOE OF LEFT FOOT ASSOCIATED WITH DIABETES MELLITUS DUE TO UNDERLYING CONDITION, WITH FAT LAYER EXPOSED: ICD-10-CM

## 2018-10-24 DIAGNOSIS — E11.49 TYPE II DIABETES MELLITUS WITH NEUROLOGICAL MANIFESTATIONS: Primary | ICD-10-CM

## 2018-10-24 DIAGNOSIS — E08.621 DIABETIC ULCER OF TOE OF LEFT FOOT ASSOCIATED WITH DIABETES MELLITUS DUE TO UNDERLYING CONDITION, WITH FAT LAYER EXPOSED: ICD-10-CM

## 2018-10-24 PROCEDURE — 3008F BODY MASS INDEX DOCD: CPT | Mod: CPTII,S$GLB,, | Performed by: PODIATRIST

## 2018-10-24 PROCEDURE — 3077F SYST BP >= 140 MM HG: CPT | Mod: CPTII,S$GLB,, | Performed by: PODIATRIST

## 2018-10-24 PROCEDURE — 3044F HG A1C LEVEL LT 7.0%: CPT | Mod: CPTII,S$GLB,, | Performed by: PODIATRIST

## 2018-10-24 PROCEDURE — 99213 OFFICE O/P EST LOW 20 MIN: CPT | Mod: S$GLB,,, | Performed by: PODIATRIST

## 2018-10-24 PROCEDURE — 3079F DIAST BP 80-89 MM HG: CPT | Mod: CPTII,S$GLB,, | Performed by: PODIATRIST

## 2018-10-24 PROCEDURE — 99999 PR PBB SHADOW E&M-EST. PATIENT-LVL III: CPT | Mod: PBBFAC,,, | Performed by: PODIATRIST
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
## 2018-10-31 ENCOUNTER — OFFICE VISIT (OUTPATIENT)
Dept: PODIATRY | Facility: CLINIC | Age: 53
End: 2018-10-31
Payer: COMMERCIAL

## 2018-10-31 VITALS — BODY MASS INDEX: 31.21 KG/M2 | HEIGHT: 75 IN | WEIGHT: 251 LBS

## 2018-10-31 DIAGNOSIS — M20.41 HAMMER TOES OF BOTH FEET: ICD-10-CM

## 2018-10-31 DIAGNOSIS — E08.621 DIABETIC ULCER OF TOE OF LEFT FOOT ASSOCIATED WITH DIABETES MELLITUS DUE TO UNDERLYING CONDITION, WITH FAT LAYER EXPOSED: ICD-10-CM

## 2018-10-31 DIAGNOSIS — L97.522 DIABETIC ULCER OF TOE OF LEFT FOOT ASSOCIATED WITH DIABETES MELLITUS DUE TO UNDERLYING CONDITION, WITH FAT LAYER EXPOSED: ICD-10-CM

## 2018-10-31 DIAGNOSIS — L90.9 PLANTAR FAT PAD ATROPHY: ICD-10-CM

## 2018-10-31 DIAGNOSIS — L97.512 DIABETIC ULCER OF OTHER PART OF RIGHT FOOT ASSOCIATED WITH TYPE 2 DIABETES MELLITUS, WITH FAT LAYER EXPOSED: ICD-10-CM

## 2018-10-31 DIAGNOSIS — E11.49 TYPE II DIABETES MELLITUS WITH NEUROLOGICAL MANIFESTATIONS: Primary | ICD-10-CM

## 2018-10-31 DIAGNOSIS — M20.10 HALLUX ABDUCTO VALGUS, UNSPECIFIED LATERALITY: ICD-10-CM

## 2018-10-31 DIAGNOSIS — E11.621 DIABETIC ULCER OF OTHER PART OF RIGHT FOOT ASSOCIATED WITH TYPE 2 DIABETES MELLITUS, WITH FAT LAYER EXPOSED: ICD-10-CM

## 2018-10-31 DIAGNOSIS — M20.42 HAMMER TOES OF BOTH FEET: ICD-10-CM

## 2018-10-31 PROCEDURE — 99213 OFFICE O/P EST LOW 20 MIN: CPT | Mod: S$GLB,,, | Performed by: PODIATRIST

## 2018-10-31 PROCEDURE — 3008F BODY MASS INDEX DOCD: CPT | Mod: CPTII,S$GLB,, | Performed by: PODIATRIST

## 2018-10-31 PROCEDURE — 99999 PR PBB SHADOW E&M-EST. PATIENT-LVL III: CPT | Mod: PBBFAC,,, | Performed by: PODIATRIST

## 2018-10-31 PROCEDURE — 3044F HG A1C LEVEL LT 7.0%: CPT | Mod: CPTII,S$GLB,, | Performed by: PODIATRIST
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
## 2018-11-07 ENCOUNTER — OFFICE VISIT (OUTPATIENT)
Dept: PODIATRY | Facility: CLINIC | Age: 53
End: 2018-11-07
Payer: COMMERCIAL

## 2018-11-07 VITALS — DIASTOLIC BLOOD PRESSURE: 80 MMHG | SYSTOLIC BLOOD PRESSURE: 130 MMHG

## 2018-11-07 DIAGNOSIS — L90.9 PLANTAR FAT PAD ATROPHY: ICD-10-CM

## 2018-11-07 DIAGNOSIS — E08.621 DIABETIC ULCER OF TOE OF LEFT FOOT ASSOCIATED WITH DIABETES MELLITUS DUE TO UNDERLYING CONDITION, WITH FAT LAYER EXPOSED: ICD-10-CM

## 2018-11-07 DIAGNOSIS — M20.42 HAMMER TOES OF BOTH FEET: ICD-10-CM

## 2018-11-07 DIAGNOSIS — M20.10 HALLUX ABDUCTO VALGUS, UNSPECIFIED LATERALITY: ICD-10-CM

## 2018-11-07 DIAGNOSIS — E11.49 TYPE II DIABETES MELLITUS WITH NEUROLOGICAL MANIFESTATIONS: Primary | ICD-10-CM

## 2018-11-07 DIAGNOSIS — L97.522 DIABETIC ULCER OF TOE OF LEFT FOOT ASSOCIATED WITH DIABETES MELLITUS DUE TO UNDERLYING CONDITION, WITH FAT LAYER EXPOSED: ICD-10-CM

## 2018-11-07 DIAGNOSIS — M20.41 HAMMER TOES OF BOTH FEET: ICD-10-CM

## 2018-11-07 DIAGNOSIS — Z87.2 HEALED ULCER OF LEFT FOOT ON EXAMINATION: ICD-10-CM

## 2018-11-07 PROCEDURE — 3075F SYST BP GE 130 - 139MM HG: CPT | Mod: CPTII,S$GLB,, | Performed by: PODIATRIST

## 2018-11-07 PROCEDURE — 99999 PR PBB SHADOW E&M-EST. PATIENT-LVL III: CPT | Mod: PBBFAC,,, | Performed by: PODIATRIST

## 2018-11-07 PROCEDURE — 3079F DIAST BP 80-89 MM HG: CPT | Mod: CPTII,S$GLB,, | Performed by: PODIATRIST

## 2018-11-07 PROCEDURE — 99213 OFFICE O/P EST LOW 20 MIN: CPT | Mod: S$GLB,,, | Performed by: PODIATRIST

## 2018-11-07 PROCEDURE — 3044F HG A1C LEVEL LT 7.0%: CPT | Mod: CPTII,S$GLB,, | Performed by: PODIATRIST
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
## 2018-11-13 ENCOUNTER — OFFICE VISIT (OUTPATIENT)
Dept: PODIATRY | Facility: CLINIC | Age: 53
End: 2018-11-13
Payer: COMMERCIAL

## 2018-11-13 VITALS
HEIGHT: 75 IN | WEIGHT: 251 LBS | SYSTOLIC BLOOD PRESSURE: 138 MMHG | DIASTOLIC BLOOD PRESSURE: 95 MMHG | BODY MASS INDEX: 31.21 KG/M2

## 2018-11-13 DIAGNOSIS — M20.10 HALLUX ABDUCTO VALGUS, UNSPECIFIED LATERALITY: ICD-10-CM

## 2018-11-13 DIAGNOSIS — M20.41 HAMMER TOES OF BOTH FEET: ICD-10-CM

## 2018-11-13 DIAGNOSIS — L90.9 PLANTAR FAT PAD ATROPHY: ICD-10-CM

## 2018-11-13 DIAGNOSIS — M20.42 HAMMER TOES OF BOTH FEET: ICD-10-CM

## 2018-11-13 DIAGNOSIS — Z86.31 HISTORY OF DIABETIC ULCER OF FOOT: ICD-10-CM

## 2018-11-13 DIAGNOSIS — E11.49 TYPE II DIABETES MELLITUS WITH NEUROLOGICAL MANIFESTATIONS: Primary | ICD-10-CM

## 2018-11-13 DIAGNOSIS — Z87.2 HEALED ULCER OF LEFT FOOT ON EXAMINATION: ICD-10-CM

## 2018-11-13 PROCEDURE — 3008F BODY MASS INDEX DOCD: CPT | Mod: CPTII,S$GLB,, | Performed by: PODIATRIST

## 2018-11-13 PROCEDURE — 99999 PR PBB SHADOW E&M-EST. PATIENT-LVL III: CPT | Mod: PBBFAC,,, | Performed by: PODIATRIST

## 2018-11-13 PROCEDURE — 99213 OFFICE O/P EST LOW 20 MIN: CPT | Mod: S$GLB,,, | Performed by: PODIATRIST

## 2018-11-13 PROCEDURE — 3044F HG A1C LEVEL LT 7.0%: CPT | Mod: CPTII,S$GLB,, | Performed by: PODIATRIST
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
## 2018-12-02 DIAGNOSIS — I10 ESSENTIAL HYPERTENSION: ICD-10-CM

## 2018-12-04 ENCOUNTER — OFFICE VISIT (OUTPATIENT)
Dept: PODIATRY | Facility: CLINIC | Age: 53
End: 2018-12-04
Payer: COMMERCIAL

## 2018-12-04 VITALS
HEIGHT: 75 IN | DIASTOLIC BLOOD PRESSURE: 94 MMHG | SYSTOLIC BLOOD PRESSURE: 130 MMHG | WEIGHT: 251 LBS | BODY MASS INDEX: 31.21 KG/M2

## 2018-12-04 DIAGNOSIS — M20.5X2 HALLUX LIMITUS, ACQUIRED, LEFT: ICD-10-CM

## 2018-12-04 DIAGNOSIS — L90.9 PLANTAR FAT PAD ATROPHY: ICD-10-CM

## 2018-12-04 DIAGNOSIS — M20.5X1 HALLUX LIMITUS, ACQUIRED, RIGHT: ICD-10-CM

## 2018-12-04 DIAGNOSIS — M20.10 HALLUX ABDUCTO VALGUS, UNSPECIFIED LATERALITY: ICD-10-CM

## 2018-12-04 DIAGNOSIS — M20.41 HAMMER TOES OF BOTH FEET: ICD-10-CM

## 2018-12-04 DIAGNOSIS — Z87.2 HEALED ULCER OF LEFT FOOT ON EXAMINATION: ICD-10-CM

## 2018-12-04 DIAGNOSIS — Z86.31 HISTORY OF DIABETIC ULCER OF FOOT: ICD-10-CM

## 2018-12-04 DIAGNOSIS — S90.811S: ICD-10-CM

## 2018-12-04 DIAGNOSIS — E11.49 TYPE II DIABETES MELLITUS WITH NEUROLOGICAL MANIFESTATIONS: Primary | ICD-10-CM

## 2018-12-04 DIAGNOSIS — M20.42 HAMMER TOES OF BOTH FEET: ICD-10-CM

## 2018-12-04 DIAGNOSIS — M21.6X1 PLANTARFLEXION DEFORMITY OF RIGHT FOOT: ICD-10-CM

## 2018-12-04 PROCEDURE — 3008F BODY MASS INDEX DOCD: CPT | Mod: CPTII,S$GLB,, | Performed by: PODIATRIST

## 2018-12-04 PROCEDURE — 3080F DIAST BP >= 90 MM HG: CPT | Mod: CPTII,S$GLB,, | Performed by: PODIATRIST

## 2018-12-04 PROCEDURE — 99214 OFFICE O/P EST MOD 30 MIN: CPT | Mod: S$GLB,,, | Performed by: PODIATRIST

## 2018-12-04 PROCEDURE — 3075F SYST BP GE 130 - 139MM HG: CPT | Mod: CPTII,S$GLB,, | Performed by: PODIATRIST

## 2018-12-04 PROCEDURE — 99999 PR PBB SHADOW E&M-EST. PATIENT-LVL III: CPT | Mod: PBBFAC,,, | Performed by: PODIATRIST

## 2018-12-04 PROCEDURE — 3044F HG A1C LEVEL LT 7.0%: CPT | Mod: CPTII,S$GLB,, | Performed by: PODIATRIST
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
## 2018-12-06 DIAGNOSIS — I10 ESSENTIAL HYPERTENSION: ICD-10-CM

## 2018-12-06 RX ORDER — DILTIAZEM HYDROCHLORIDE 240 MG/1
CAPSULE, COATED, EXTENDED RELEASE ORAL
Qty: 90 CAPSULE | Refills: 0 | Status: SHIPPED | OUTPATIENT
Start: 2018-12-06 | End: 2019-01-31 | Stop reason: SDUPTHER

## 2018-12-06 RX ORDER — LISINOPRIL 20 MG/1
TABLET ORAL
Qty: 90 TABLET | Refills: 0 | Status: SHIPPED | OUTPATIENT
Start: 2018-12-06 | End: 2019-01-08 | Stop reason: SDUPTHER

## 2018-12-06 RX ORDER — CHLORTHALIDONE 25 MG/1
TABLET ORAL
Qty: 90 TABLET | Refills: 0 | Status: SHIPPED | OUTPATIENT
Start: 2018-12-06 | End: 2019-01-08 | Stop reason: SDUPTHER

## 2019-01-07 ENCOUNTER — OFFICE VISIT (OUTPATIENT)
Dept: PODIATRY | Facility: CLINIC | Age: 54
End: 2019-01-07
Payer: COMMERCIAL

## 2019-01-07 VITALS — BODY MASS INDEX: 31.21 KG/M2 | HEIGHT: 75 IN | WEIGHT: 251 LBS

## 2019-01-07 DIAGNOSIS — M20.10 HALLUX ABDUCTO VALGUS, UNSPECIFIED LATERALITY: ICD-10-CM

## 2019-01-07 DIAGNOSIS — E11.49 TYPE II DIABETES MELLITUS WITH NEUROLOGICAL MANIFESTATIONS: ICD-10-CM

## 2019-01-07 DIAGNOSIS — M20.42 HAMMER TOES OF BOTH FEET: ICD-10-CM

## 2019-01-07 DIAGNOSIS — M20.5X1 HALLUX LIMITUS, ACQUIRED, RIGHT: ICD-10-CM

## 2019-01-07 DIAGNOSIS — M20.41 HAMMER TOES OF BOTH FEET: ICD-10-CM

## 2019-01-07 DIAGNOSIS — M21.6X1 PLANTARFLEXION DEFORMITY OF RIGHT FOOT: ICD-10-CM

## 2019-01-07 DIAGNOSIS — E11.621 DIABETIC ULCER OF RIGHT MIDFOOT ASSOCIATED WITH TYPE 2 DIABETES MELLITUS, WITH FAT LAYER EXPOSED: Primary | ICD-10-CM

## 2019-01-07 DIAGNOSIS — L90.9 PLANTAR FAT PAD ATROPHY: ICD-10-CM

## 2019-01-07 DIAGNOSIS — L97.412 DIABETIC ULCER OF RIGHT MIDFOOT ASSOCIATED WITH TYPE 2 DIABETES MELLITUS, WITH FAT LAYER EXPOSED: Primary | ICD-10-CM

## 2019-01-07 DIAGNOSIS — Z86.31 HISTORY OF DIABETIC ULCER OF FOOT: ICD-10-CM

## 2019-01-07 DIAGNOSIS — M20.5X2 HALLUX LIMITUS, ACQUIRED, LEFT: ICD-10-CM

## 2019-01-07 PROCEDURE — 99999 PR PBB SHADOW E&M-EST. PATIENT-LVL III: ICD-10-PCS | Mod: PBBFAC,,, | Performed by: PODIATRIST

## 2019-01-07 PROCEDURE — 3008F BODY MASS INDEX DOCD: CPT | Mod: CPTII,S$GLB,, | Performed by: PODIATRIST

## 2019-01-07 PROCEDURE — 99214 OFFICE O/P EST MOD 30 MIN: CPT | Mod: 25,S$GLB,, | Performed by: PODIATRIST

## 2019-01-07 PROCEDURE — 11042 DBRDMT SUBQ TIS 1ST 20SQCM/<: CPT | Mod: S$GLB,,, | Performed by: PODIATRIST

## 2019-01-07 PROCEDURE — 3044F PR MOST RECENT HEMOGLOBIN A1C LEVEL <7.0%: ICD-10-PCS | Mod: CPTII,S$GLB,, | Performed by: PODIATRIST

## 2019-01-07 PROCEDURE — 99214 PR OFFICE/OUTPT VISIT, EST, LEVL IV, 30-39 MIN: ICD-10-PCS | Mod: 25,S$GLB,, | Performed by: PODIATRIST

## 2019-01-07 PROCEDURE — 99999 PR PBB SHADOW E&M-EST. PATIENT-LVL III: CPT | Mod: PBBFAC,,, | Performed by: PODIATRIST

## 2019-01-07 PROCEDURE — 11042 WOUND DEBRIDEMENT: ICD-10-PCS | Mod: S$GLB,,, | Performed by: PODIATRIST

## 2019-01-07 PROCEDURE — 3044F HG A1C LEVEL LT 7.0%: CPT | Mod: CPTII,S$GLB,, | Performed by: PODIATRIST

## 2019-01-07 PROCEDURE — 3008F PR BODY MASS INDEX (BMI) DOCUMENTED: ICD-10-PCS | Mod: CPTII,S$GLB,, | Performed by: PODIATRIST

## 2019-01-07 RX ORDER — CIPROFLOXACIN 500 MG/1
500 TABLET ORAL 2 TIMES DAILY
Qty: 20 TABLET | Refills: 0 | Status: SHIPPED | OUTPATIENT
Start: 2019-01-07 | End: 2019-01-17
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
## 2019-01-07 NOTE — PATIENT INSTRUCTIONS
Recommend lotions: eucerin, eucerin for diabetics, aquaphor, A&D ointment, gold bond for diabetics, sween, Walnut's Bees all purpose baby ointment,  urea 40 with aloe (found on amazon.com)    Shoe recommendations: (try 6pm.com, zappos.com , nordstromrack.mechatronic [email or web address], or shoes.mechatronic systemtechnik for discounted prices) you can visit DSW shoes in [place]  or Dry Lube [facility] in the [facility] (there are also several shoe brand outlets in the [facility])    Asics (GT 2000 or gel foundations), new balance stability type shoes, saucony (stabil c3),  [place] (GTS or Beast or transcend), propet (tennis shoe)    Sofwillam Larios (women) Reginald&Desmond (men), clarks, crocs, aerosoles, naturalizers, SAS, ecco, born, pauline buck, rockports (dress shoes)    Vionic, burkenstocks, fitflops, propet (sandals)  Nike comfort thong sandals, crocs, propet (house shoes)    Nail Home remedy:  Vicks Vapor rub to nails for easier manageability      Diabetes: Inspecting Your Feet  Diabetes increases your chances of developing foot problems. So inspect your feet every day. This helps you find small skin irritations before they become serious infections. If you have trouble seeing the bottoms of your feet, use a mirror or ask a family member or friend to help.     Pressure spots on the bottom of the foot are common areas where problems develop.   How to check your feet  Below are tips to help you look for foot problems. Try to check your feet at the same time each day, such as when you get out of bed in the morning:  · Check the top of each foot. The tops of toes, back of the heel, and outer edge of the foot can get a lot of rubbing from poor-fitting shoes.  · Check the bottom of each foot. Daily wear and tear often leads to problems at pressure spots.  · Check the toes and nails. Fungal infections often occur between toes. Toenail problems can also be a sign of fungal infections or lead to breaks in the skin.  · Check your shoes, too. Loose objects inside a shoe can injure the  foot. Use your hand to feel inside your shoes for things like james, loose stitching, or rough areas that could irritate your skin.  Warning signs  Look for any color changes in the foot. Redness with streaks can signal a severe infection, which needs immediate medical attention. Tell your doctor right away if you have any of these problems:  · Swelling, sometimes with color changes, may be a sign of poor blood flow or infection. Symptoms include tenderness and an increase in the size of your foot.  · Warm or hot areas on your feet may be signs of infection. A foot that is cold may not be getting enough blood.  · Sensations such as burning, tingling, or pins and needles can be signs of a problem. Also check for areas that may be numb.  · Hot spots are caused by friction or pressure. Look for hot spots in areas that get a lot of rubbing. Hot spots can turn into blisters, calluses, or sores.  · Cracks and sores are caused by dry or irritated skin. They are a sign that the skin is breaking down, which can lead to infection.  · Toenail problems to watch for include nails growing into the skin (ingrown toenail) and causing redness or pain. Thick, yellow, or discolored nails can signal a fungal infection.  · Drainage and odor can develop from untreated sores and ulcers. Call your doctor right away if you notice white or yellow drainage, bleeding, or unpleasant odor.   © 7134-4751 [employer]. [address] [place] [address]. All rights reserved. This information is not intended as a substitute for professional medical care. Always follow your healthcare professional's instructions.        Step-by-Step:  Inspecting Your Feet (Diabetes)    Date Last Reviewed: 10/1/2016  © 1798-5424 [employer]. [address] [place] [address]. All rights reserved. This information is not intended as a substitute for professional medical care. Always follow your healthcare professional's  instructions.

## 2019-01-08 DIAGNOSIS — I10 ESSENTIAL HYPERTENSION: ICD-10-CM

## 2019-01-10 RX ORDER — LISINOPRIL 20 MG/1
TABLET ORAL
Qty: 90 TABLET | Refills: 0 | Status: SHIPPED | OUTPATIENT
Start: 2019-01-10 | End: 2019-03-30 | Stop reason: SDUPTHER

## 2019-01-10 RX ORDER — CHLORTHALIDONE 25 MG/1
TABLET ORAL
Qty: 90 TABLET | Refills: 0 | Status: SHIPPED | OUTPATIENT
Start: 2019-01-10 | End: 2019-02-28

## 2019-01-14 ENCOUNTER — OFFICE VISIT (OUTPATIENT)
Dept: PODIATRY | Facility: CLINIC | Age: 54
End: 2019-01-14
Payer: COMMERCIAL

## 2019-01-14 VITALS — WEIGHT: 251 LBS | HEIGHT: 75 IN | BODY MASS INDEX: 31.21 KG/M2

## 2019-01-14 DIAGNOSIS — E11.49 TYPE II DIABETES MELLITUS WITH NEUROLOGICAL MANIFESTATIONS: Primary | ICD-10-CM

## 2019-01-14 DIAGNOSIS — L97.412 DIABETIC ULCER OF RIGHT MIDFOOT ASSOCIATED WITH TYPE 2 DIABETES MELLITUS, WITH FAT LAYER EXPOSED: ICD-10-CM

## 2019-01-14 DIAGNOSIS — E11.621 DIABETIC ULCER OF RIGHT MIDFOOT ASSOCIATED WITH TYPE 2 DIABETES MELLITUS, WITH FAT LAYER EXPOSED: ICD-10-CM

## 2019-01-14 PROCEDURE — 11042 WOUND DEBRIDEMENT: ICD-10-PCS | Mod: S$GLB,,, | Performed by: PODIATRIST

## 2019-01-14 PROCEDURE — 99999 PR PBB SHADOW E&M-EST. PATIENT-LVL III: ICD-10-PCS | Mod: PBBFAC,,, | Performed by: PODIATRIST

## 2019-01-14 PROCEDURE — 99999 PR PBB SHADOW E&M-EST. PATIENT-LVL III: CPT | Mod: PBBFAC,,, | Performed by: PODIATRIST

## 2019-01-14 PROCEDURE — 99499 NO LOS: ICD-10-PCS | Mod: S$GLB,,, | Performed by: PODIATRIST

## 2019-01-14 PROCEDURE — 11042 DBRDMT SUBQ TIS 1ST 20SQCM/<: CPT | Mod: S$GLB,,, | Performed by: PODIATRIST

## 2019-01-14 PROCEDURE — 99499 UNLISTED E&M SERVICE: CPT | Mod: S$GLB,,, | Performed by: PODIATRIST

## 2019-01-14 NOTE — PATIENT INSTRUCTIONS
Please keep football dressing clean, dry, and intact.  If dressing gets wet please contact our office.    Wear special shoe every time foot is placed on the floor.    Elevate affected foot as much as possible    Stay hydrated.      Nutrition and MyPlate: Protein Foods  This group includes foods that are high in protein. Protein helps the body build new cells and keeps tissues healthy. Most Americans get enough protein without even trying. It can be harder for vegetarians, but plenty of non-meat foods are rich in protein, too. Its best to get protein from a variety of sources.    Nutrient-Rich Choices  Theres a lot more to this food group than just meat and beans. It also includes nuts, seeds, and eggs. There are all sorts of nutrient-rich choices:  · Chicken and turkey with the skin removed  · Fish and shellfish  · Lean beef, pork, or lamb (without visible fat)  · Soy products, such as tofu, soybeans (edamame), tempeh, or soymilk  · Black beans, kidney beans, corrigan beans, chickpeas (garbanzo beans), and lentils (Note: beans and peas count as both a protein and a vegetable)  · Peanuts, almonds, walnuts, sesame seeds, and sunflower  seeds, as well as foods made from these (such as peanut butter or tahini)  · Eggs and foods made with eggs (such as quiche or frittata)  What Makes Meat and Beans Less Healthy?  · Fatty meat is not healthy. Before you cook meat, trim off all the fat you can see. Chicken and turkey skin is also high in fat, and should be removed before cooking.  · Breading and frying make food less healthy. This includes dishes like fried chicken, fried fish, and refried beans.  · Sausage and lunch meats tend to be high in fat and salt. Buy low-fat, low-sodium versions.  One Small Change  Make a meal that includes a non-meat source of protein (such as tofu, lentils, or any other food listed above). Have a better idea? Write it here:  _____________________________________________________________  ©  0680-2598 [employer]. [address] [place] [address], [place] [address]. All rights reserved. This information is not intended as a substitute for professional medical care. Always follow your healthcare professional's instructions.
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
## 2019-01-21 ENCOUNTER — OFFICE VISIT (OUTPATIENT)
Dept: PODIATRY | Facility: CLINIC | Age: 54
End: 2019-01-21
Payer: COMMERCIAL

## 2019-01-21 VITALS — HEIGHT: 75 IN | WEIGHT: 251.13 LBS | BODY MASS INDEX: 31.22 KG/M2

## 2019-01-21 DIAGNOSIS — E11.49 TYPE II DIABETES MELLITUS WITH NEUROLOGICAL MANIFESTATIONS: Primary | ICD-10-CM

## 2019-01-21 DIAGNOSIS — M20.5X2 HALLUX LIMITUS, ACQUIRED, LEFT: ICD-10-CM

## 2019-01-21 DIAGNOSIS — M20.5X1 HALLUX LIMITUS, ACQUIRED, RIGHT: ICD-10-CM

## 2019-01-21 DIAGNOSIS — M20.41 HAMMER TOES OF BOTH FEET: ICD-10-CM

## 2019-01-21 DIAGNOSIS — E11.621 DIABETIC ULCER OF RIGHT MIDFOOT ASSOCIATED WITH TYPE 2 DIABETES MELLITUS, WITH FAT LAYER EXPOSED: ICD-10-CM

## 2019-01-21 DIAGNOSIS — M21.6X1 PLANTARFLEXION DEFORMITY OF RIGHT FOOT: ICD-10-CM

## 2019-01-21 DIAGNOSIS — L97.412 DIABETIC ULCER OF RIGHT MIDFOOT ASSOCIATED WITH TYPE 2 DIABETES MELLITUS, WITH FAT LAYER EXPOSED: ICD-10-CM

## 2019-01-21 DIAGNOSIS — L90.9 PLANTAR FAT PAD ATROPHY: ICD-10-CM

## 2019-01-21 DIAGNOSIS — M20.10 HALLUX ABDUCTO VALGUS, UNSPECIFIED LATERALITY: ICD-10-CM

## 2019-01-21 DIAGNOSIS — M20.42 HAMMER TOES OF BOTH FEET: ICD-10-CM

## 2019-01-21 PROCEDURE — 99213 PR OFFICE/OUTPT VISIT, EST, LEVL III, 20-29 MIN: ICD-10-PCS | Mod: S$GLB,,, | Performed by: PODIATRIST

## 2019-01-21 PROCEDURE — 99999 PR PBB SHADOW E&M-EST. PATIENT-LVL III: ICD-10-PCS | Mod: PBBFAC,,, | Performed by: PODIATRIST

## 2019-01-21 PROCEDURE — 3008F PR BODY MASS INDEX (BMI) DOCUMENTED: ICD-10-PCS | Mod: CPTII,S$GLB,, | Performed by: PODIATRIST

## 2019-01-21 PROCEDURE — 3044F HG A1C LEVEL LT 7.0%: CPT | Mod: CPTII,S$GLB,, | Performed by: PODIATRIST

## 2019-01-21 PROCEDURE — 99999 PR PBB SHADOW E&M-EST. PATIENT-LVL III: CPT | Mod: PBBFAC,,, | Performed by: PODIATRIST

## 2019-01-21 PROCEDURE — 99213 OFFICE O/P EST LOW 20 MIN: CPT | Mod: S$GLB,,, | Performed by: PODIATRIST

## 2019-01-21 PROCEDURE — 3008F BODY MASS INDEX DOCD: CPT | Mod: CPTII,S$GLB,, | Performed by: PODIATRIST

## 2019-01-21 PROCEDURE — 3044F PR MOST RECENT HEMOGLOBIN A1C LEVEL <7.0%: ICD-10-PCS | Mod: CPTII,S$GLB,, | Performed by: PODIATRIST
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
## 2019-01-31 DIAGNOSIS — I10 ESSENTIAL HYPERTENSION: ICD-10-CM

## 2019-02-01 RX ORDER — DILTIAZEM HYDROCHLORIDE 240 MG/1
CAPSULE, COATED, EXTENDED RELEASE ORAL
Qty: 90 CAPSULE | Refills: 0 | Status: SHIPPED | OUTPATIENT
Start: 2019-02-01 | End: 2019-05-31 | Stop reason: SDUPTHER

## 2019-02-04 ENCOUNTER — OFFICE VISIT (OUTPATIENT)
Dept: PODIATRY | Facility: CLINIC | Age: 54
End: 2019-02-04
Payer: COMMERCIAL

## 2019-02-04 VITALS
BODY MASS INDEX: 31.22 KG/M2 | WEIGHT: 251.13 LBS | DIASTOLIC BLOOD PRESSURE: 90 MMHG | SYSTOLIC BLOOD PRESSURE: 130 MMHG | HEIGHT: 75 IN

## 2019-02-04 DIAGNOSIS — E11.49 TYPE II DIABETES MELLITUS WITH NEUROLOGICAL MANIFESTATIONS: Primary | ICD-10-CM

## 2019-02-04 DIAGNOSIS — M20.5X2 HALLUX LIMITUS, ACQUIRED, LEFT: ICD-10-CM

## 2019-02-04 DIAGNOSIS — M20.5X1 HALLUX LIMITUS, ACQUIRED, RIGHT: ICD-10-CM

## 2019-02-04 DIAGNOSIS — M20.41 HAMMER TOES OF BOTH FEET: ICD-10-CM

## 2019-02-04 DIAGNOSIS — M21.6X1 PLANTARFLEXION DEFORMITY OF RIGHT FOOT: ICD-10-CM

## 2019-02-04 DIAGNOSIS — Z86.31 HISTORY OF DIABETIC ULCER OF FOOT: ICD-10-CM

## 2019-02-04 DIAGNOSIS — M20.42 HAMMER TOES OF BOTH FEET: ICD-10-CM

## 2019-02-04 PROCEDURE — 3075F SYST BP GE 130 - 139MM HG: CPT | Mod: CPTII,S$GLB,, | Performed by: PODIATRIST

## 2019-02-04 PROCEDURE — 3008F BODY MASS INDEX DOCD: CPT | Mod: CPTII,S$GLB,, | Performed by: PODIATRIST

## 2019-02-04 PROCEDURE — 3008F PR BODY MASS INDEX (BMI) DOCUMENTED: ICD-10-PCS | Mod: CPTII,S$GLB,, | Performed by: PODIATRIST

## 2019-02-04 PROCEDURE — 3044F HG A1C LEVEL LT 7.0%: CPT | Mod: CPTII,S$GLB,, | Performed by: PODIATRIST

## 2019-02-04 PROCEDURE — 3075F PR MOST RECENT SYSTOLIC BLOOD PRESS GE 130-139MM HG: ICD-10-PCS | Mod: CPTII,S$GLB,, | Performed by: PODIATRIST

## 2019-02-04 PROCEDURE — 3080F DIAST BP >= 90 MM HG: CPT | Mod: CPTII,S$GLB,, | Performed by: PODIATRIST

## 2019-02-04 PROCEDURE — 99213 OFFICE O/P EST LOW 20 MIN: CPT | Mod: S$GLB,,, | Performed by: PODIATRIST

## 2019-02-04 PROCEDURE — 3044F PR MOST RECENT HEMOGLOBIN A1C LEVEL <7.0%: ICD-10-PCS | Mod: CPTII,S$GLB,, | Performed by: PODIATRIST

## 2019-02-04 PROCEDURE — 99999 PR PBB SHADOW E&M-EST. PATIENT-LVL III: CPT | Mod: PBBFAC,,, | Performed by: PODIATRIST

## 2019-02-04 PROCEDURE — 99213 PR OFFICE/OUTPT VISIT, EST, LEVL III, 20-29 MIN: ICD-10-PCS | Mod: S$GLB,,, | Performed by: PODIATRIST

## 2019-02-04 PROCEDURE — 3080F PR MOST RECENT DIASTOLIC BLOOD PRESSURE >= 90 MM HG: ICD-10-PCS | Mod: CPTII,S$GLB,, | Performed by: PODIATRIST

## 2019-02-04 PROCEDURE — 99999 PR PBB SHADOW E&M-EST. PATIENT-LVL III: ICD-10-PCS | Mod: PBBFAC,,, | Performed by: PODIATRIST
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
## 2019-02-15 DIAGNOSIS — E11.9 TYPE 2 DIABETES MELLITUS WITHOUT COMPLICATION: ICD-10-CM

## 2019-02-26 DIAGNOSIS — I10 ESSENTIAL HYPERTENSION: ICD-10-CM

## 2019-02-28 RX ORDER — CHLORTHALIDONE 25 MG/1
TABLET ORAL
Qty: 90 TABLET | Refills: 0 | Status: SHIPPED | OUTPATIENT
Start: 2019-02-28 | End: 2019-05-07 | Stop reason: SDUPTHER

## 2019-03-04 ENCOUNTER — OFFICE VISIT (OUTPATIENT)
Dept: PODIATRY | Facility: CLINIC | Age: 54
End: 2019-03-04
Payer: COMMERCIAL

## 2019-03-04 VITALS — WEIGHT: 251 LBS | BODY MASS INDEX: 31.21 KG/M2 | HEIGHT: 75 IN

## 2019-03-04 DIAGNOSIS — M21.6X1 PLANTARFLEXION DEFORMITY OF RIGHT FOOT: ICD-10-CM

## 2019-03-04 DIAGNOSIS — L84 PRE-ULCERATIVE CALLUSES: ICD-10-CM

## 2019-03-04 DIAGNOSIS — Z86.31 HISTORY OF DIABETIC ULCER OF FOOT: ICD-10-CM

## 2019-03-04 DIAGNOSIS — M20.42 HAMMER TOES OF BOTH FEET: ICD-10-CM

## 2019-03-04 DIAGNOSIS — M20.41 HAMMER TOES OF BOTH FEET: ICD-10-CM

## 2019-03-04 DIAGNOSIS — M20.5X2 HALLUX LIMITUS, ACQUIRED, LEFT: ICD-10-CM

## 2019-03-04 DIAGNOSIS — E11.49 TYPE II DIABETES MELLITUS WITH NEUROLOGICAL MANIFESTATIONS: Primary | ICD-10-CM

## 2019-03-04 DIAGNOSIS — M20.5X1 HALLUX LIMITUS, ACQUIRED, RIGHT: ICD-10-CM

## 2019-03-04 PROCEDURE — 3044F PR MOST RECENT HEMOGLOBIN A1C LEVEL <7.0%: ICD-10-PCS | Mod: CPTII,S$GLB,, | Performed by: PODIATRIST

## 2019-03-04 PROCEDURE — 3044F HG A1C LEVEL LT 7.0%: CPT | Mod: CPTII,S$GLB,, | Performed by: PODIATRIST

## 2019-03-04 PROCEDURE — 99999 PR PBB SHADOW E&M-EST. PATIENT-LVL III: CPT | Mod: PBBFAC,,, | Performed by: PODIATRIST

## 2019-03-04 PROCEDURE — 3008F PR BODY MASS INDEX (BMI) DOCUMENTED: ICD-10-PCS | Mod: CPTII,S$GLB,, | Performed by: PODIATRIST

## 2019-03-04 PROCEDURE — 3008F BODY MASS INDEX DOCD: CPT | Mod: CPTII,S$GLB,, | Performed by: PODIATRIST

## 2019-03-04 PROCEDURE — 99213 OFFICE O/P EST LOW 20 MIN: CPT | Mod: S$GLB,,, | Performed by: PODIATRIST

## 2019-03-04 PROCEDURE — 99213 PR OFFICE/OUTPT VISIT, EST, LEVL III, 20-29 MIN: ICD-10-PCS | Mod: S$GLB,,, | Performed by: PODIATRIST

## 2019-03-04 PROCEDURE — 99999 PR PBB SHADOW E&M-EST. PATIENT-LVL III: ICD-10-PCS | Mod: PBBFAC,,, | Performed by: PODIATRIST

## 2019-03-04 NOTE — PATIENT INSTRUCTIONS
Recommend lotions: eucerin, eucerin for diabetics, aquaphor, A&D ointment, gold bond for diabetics, sween, Homer's Bees all purpose baby ointment,  urea 40 with aloe (found on amazon.com)    Shoe recommendations: (try 6pm.com, zappos.com , nordstromrack.OpenPlacement, or [email or web address] for discounted prices) you can visit DSW shoes in [place]  or [employer] [facility] in the [facility] (there are also several shoe brand outlets in the [facility])    Asics (GT 2000 or gel foundations), new balance stability type shoes, saucony (stabil c3),  [place] (GTS or Beast or transcend), propet (tennis shoe)    Sofwillam Larios (women) Reginald&Desmond (men), clarks, crocs, aerosoles, naturalizers, SAS, ecco, born, pauline buck, rockports (dress shoes)    Vionic, burkenstocks, fitflops, propet (sandals)  Nike comfort thong sandals, crocs, propet (house shoes)    Nail Home remedy:  Vicks Vapor rub to nails for easier manageability      Diabetes: Inspecting Your Feet  Diabetes increases your chances of developing foot problems. So inspect your feet every day. This helps you find small skin irritations before they become serious infections. If you have trouble seeing the bottoms of your feet, use a mirror or ask a family member or friend to help.     Pressure spots on the bottom of the foot are common areas where problems develop.   How to check your feet  Below are tips to help you look for foot problems. Try to check your feet at the same time each day, such as when you get out of bed in the morning:  · Check the top of each foot. The tops of toes, back of the heel, and outer edge of the foot can get a lot of rubbing from poor-fitting shoes.  · Check the bottom of each foot. Daily wear and tear often leads to problems at pressure spots.  · Check the toes and nails. Fungal infections often occur between toes. Toenail problems can also be a sign of fungal infections or lead to breaks in the skin.  · Check your shoes, too. Loose objects inside a shoe can injure the  foot. Use your hand to feel inside your shoes for things like james, loose stitching, or rough areas that could irritate your skin.  Warning signs  Look for any color changes in the foot. Redness with streaks can signal a severe infection, which needs immediate medical attention. Tell your doctor right away if you have any of these problems:  · Swelling, sometimes with color changes, may be a sign of poor blood flow or infection. Symptoms include tenderness and an increase in the size of your foot.  · Warm or hot areas on your feet may be signs of infection. A foot that is cold may not be getting enough blood.  · Sensations such as burning, tingling, or pins and needles can be signs of a problem. Also check for areas that may be numb.  · Hot spots are caused by friction or pressure. Look for hot spots in areas that get a lot of rubbing. Hot spots can turn into blisters, calluses, or sores.  · Cracks and sores are caused by dry or irritated skin. They are a sign that the skin is breaking down, which can lead to infection.  · Toenail problems to watch for include nails growing into the skin (ingrown toenail) and causing redness or pain. Thick, yellow, or discolored nails can signal a fungal infection.  · Drainage and odor can develop from untreated sores and ulcers. Call your doctor right away if you notice white or yellow drainage, bleeding, or unpleasant odor.   © 6958-0381 [employer]. [address] [place] [address]. All rights reserved. This information is not intended as a substitute for professional medical care. Always follow your healthcare professional's instructions.        Step-by-Step:  Inspecting Your Feet (Diabetes)    Date Last Reviewed: 10/1/2016  © 5843-1941 [employer]. [address] [place] [id number]. All rights reserved. This information is not intended as a substitute for professional medical care. Always follow your healthcare professional's  instructions.          +
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
## 2019-03-26 RX ORDER — DULAGLUTIDE 0.75 MG/.5ML
INJECTION, SOLUTION SUBCUTANEOUS
Qty: 2 ML | Refills: 0 | Status: SHIPPED | OUTPATIENT
Start: 2019-03-26 | End: 2019-04-29 | Stop reason: SDUPTHER

## 2019-03-30 DIAGNOSIS — I10 ESSENTIAL HYPERTENSION: ICD-10-CM

## 2019-03-31 DIAGNOSIS — I10 ESSENTIAL HYPERTENSION: ICD-10-CM

## 2019-03-31 RX ORDER — LISINOPRIL 20 MG/1
TABLET ORAL
Qty: 30 TABLET | Refills: 0 | Status: SHIPPED | OUTPATIENT
Start: 2019-03-31 | End: 2019-05-03 | Stop reason: SDUPTHER

## 2019-03-31 RX ORDER — LISINOPRIL 20 MG/1
TABLET ORAL
Qty: 90 TABLET | Refills: 0 | OUTPATIENT
Start: 2019-03-31

## 2019-04-08 ENCOUNTER — OFFICE VISIT (OUTPATIENT)
Dept: PODIATRY | Facility: CLINIC | Age: 54
End: 2019-04-08
Payer: COMMERCIAL

## 2019-04-08 VITALS
BODY MASS INDEX: 31.21 KG/M2 | HEART RATE: 75 BPM | DIASTOLIC BLOOD PRESSURE: 100 MMHG | SYSTOLIC BLOOD PRESSURE: 161 MMHG | WEIGHT: 251 LBS | HEIGHT: 75 IN

## 2019-04-08 DIAGNOSIS — E11.49 TYPE II DIABETES MELLITUS WITH NEUROLOGICAL MANIFESTATIONS: Primary | ICD-10-CM

## 2019-04-08 DIAGNOSIS — M21.6X1 PLANTARFLEXION DEFORMITY OF RIGHT FOOT: ICD-10-CM

## 2019-04-08 DIAGNOSIS — M20.5X2 HALLUX LIMITUS, ACQUIRED, LEFT: ICD-10-CM

## 2019-04-08 DIAGNOSIS — Z86.31 HISTORY OF DIABETIC ULCER OF FOOT: ICD-10-CM

## 2019-04-08 DIAGNOSIS — M20.5X1 HALLUX LIMITUS, ACQUIRED, RIGHT: ICD-10-CM

## 2019-04-08 DIAGNOSIS — L84 PRE-ULCERATIVE CALLUSES: ICD-10-CM

## 2019-04-08 DIAGNOSIS — M20.41 HAMMER TOES OF BOTH FEET: ICD-10-CM

## 2019-04-08 DIAGNOSIS — M20.42 HAMMER TOES OF BOTH FEET: ICD-10-CM

## 2019-04-08 PROCEDURE — 3080F DIAST BP >= 90 MM HG: CPT | Mod: CPTII,S$GLB,, | Performed by: PODIATRIST

## 2019-04-08 PROCEDURE — 3077F SYST BP >= 140 MM HG: CPT | Mod: CPTII,S$GLB,, | Performed by: PODIATRIST

## 2019-04-08 PROCEDURE — 3008F BODY MASS INDEX DOCD: CPT | Mod: CPTII,S$GLB,, | Performed by: PODIATRIST

## 2019-04-08 PROCEDURE — 3044F HG A1C LEVEL LT 7.0%: CPT | Mod: CPTII,S$GLB,, | Performed by: PODIATRIST

## 2019-04-08 PROCEDURE — 3044F PR MOST RECENT HEMOGLOBIN A1C LEVEL <7.0%: ICD-10-PCS | Mod: CPTII,S$GLB,, | Performed by: PODIATRIST

## 2019-04-08 PROCEDURE — 99999 PR PBB SHADOW E&M-EST. PATIENT-LVL IV: CPT | Mod: PBBFAC,,, | Performed by: PODIATRIST

## 2019-04-08 PROCEDURE — 99213 PR OFFICE/OUTPT VISIT, EST, LEVL III, 20-29 MIN: ICD-10-PCS | Mod: S$GLB,,, | Performed by: PODIATRIST

## 2019-04-08 PROCEDURE — 99999 PR PBB SHADOW E&M-EST. PATIENT-LVL IV: ICD-10-PCS | Mod: PBBFAC,,, | Performed by: PODIATRIST

## 2019-04-08 PROCEDURE — 3077F PR MOST RECENT SYSTOLIC BLOOD PRESSURE >= 140 MM HG: ICD-10-PCS | Mod: CPTII,S$GLB,, | Performed by: PODIATRIST

## 2019-04-08 PROCEDURE — 99213 OFFICE O/P EST LOW 20 MIN: CPT | Mod: S$GLB,,, | Performed by: PODIATRIST

## 2019-04-08 PROCEDURE — 3008F PR BODY MASS INDEX (BMI) DOCUMENTED: ICD-10-PCS | Mod: CPTII,S$GLB,, | Performed by: PODIATRIST

## 2019-04-08 PROCEDURE — 3080F PR MOST RECENT DIASTOLIC BLOOD PRESSURE >= 90 MM HG: ICD-10-PCS | Mod: CPTII,S$GLB,, | Performed by: PODIATRIST

## 2019-04-08 NOTE — PATIENT INSTRUCTIONS
Wound has healed well with no signs of continued skin breakdown noted   Ok to transition into normal shoe gear at this time. Check feet daily for signs of drainage or lesion re-opening   Use of daily foot moisturizer to feet, avoiding the webspace's    Recommend lotions: eucerin, eucerin for diabetics, aquaphor, A&D ointment, gold bond for diabetics, sween, [place]'s Bees all purpose baby ointment,  urea 40 with aloe (found on amazon.com)    Shoe recommendations: (try 6pm.com, zappos.com , nordstromrack.METRIXWARE, or shoes.METRIXWARE for discounted prices) you can visit DSW shoes in [place]  or [place] in the [facility] (there are also several shoe brand outlets in the [facility])    Asics (GT 2000 or gel foundations), new balance stability type shoes, saucony (stabil c3),  [place] (GTS or Beast or transcend), propet (tennis shoe)    Velma Larios (women) Reginald&Desmond (men), clarks, crocs, aerosoles, naturalizers, SAS, ecco, born, pauline buck, rockports (dress shoes)    Vionic, burkenstocks, fitflops, propet (sandals)  Nike comfort thong sandals, crocs, propet (house shoes)    Nail Home remedy:  Vicks Vapor rub to nails for easier manageability    Occasional soaks for 15-20 mins in luke warm water with 1/2 cup of listerine and 1 cup of apple cider vinegar are ok You may add several drops of oil of oregano or tea tree oil as well      Diabetes: Inspecting Your Feet  Diabetes increases your chances of developing foot problems. So inspect your feet every day. This helps you find small skin irritations before they become serious infections. If you have trouble seeing the bottoms of your feet, use a mirror or ask a family member or friend to help.     Pressure spots on the bottom of the foot are common areas where problems develop.   How to check your feet  Below are tips to help you look for foot problems. Try to check your feet at the same time each day, such as when you get out of bed in the morning:  · Check the top of each foot.  The tops of toes, back of the heel, and outer edge of the foot can get a lot of rubbing from poor-fitting shoes.  · Check the bottom of each foot. Daily wear and tear often leads to problems at pressure spots.  · Check the toes and nails. Fungal infections often occur between toes. Toenail problems can also be a sign of fungal infections or lead to breaks in the skin.  · Check your shoes, too. Loose objects inside a shoe can injure the foot. Use your hand to feel inside your shoes for things like james, loose stitching, or rough areas that could irritate your skin.  Warning signs  Look for any color changes in the foot. Redness with streaks can signal a severe infection, which needs immediate medical attention. Tell your doctor right away if you have any of these problems:  · Swelling, sometimes with color changes, may be a sign of poor blood flow or infection. Symptoms include tenderness and an increase in the size of your foot.  · Warm or hot areas on your feet may be signs of infection. A foot that is cold may not be getting enough blood.  · Sensations such as burning, tingling, or pins and needles can be signs of a problem. Also check for areas that may be numb.  · Hot spots are caused by friction or pressure. Look for hot spots in areas that get a lot of rubbing. Hot spots can turn into blisters, calluses, or sores.  · Cracks and sores are caused by dry or irritated skin. They are a sign that the skin is breaking down, which can lead to infection.  · Toenail problems to watch for include nails growing into the skin (ingrown toenail) and causing redness or pain. Thick, yellow, or discolored nails can signal a fungal infection.  · Drainage and odor can develop from untreated sores and ulcers. Call your doctor right away if you notice white or yellow drainage, bleeding, or unpleasant odor.   © 6521-3181 [employer]. [address] [place] [address], [place] [address]. All rights reserved. This information  is not intended as a substitute for professional medical care. Always follow your healthcare professional's instructions.        Step-by-Step:  Inspecting Your Feet (Diabetes)    Date Last Reviewed: 10/1/2016  © 7603-4617 [employer]. [address] [place] [address], [place] [address]. All rights reserved. This information is not intended as a substitute for professional medical care. Always follow your healthcare professional's instructions.
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
## 2019-05-01 RX ORDER — DULAGLUTIDE 0.75 MG/.5ML
INJECTION, SOLUTION SUBCUTANEOUS
Qty: 2 ML | Refills: 0 | Status: SHIPPED | OUTPATIENT
Start: 2019-05-01 | End: 2019-05-28 | Stop reason: SDUPTHER

## 2019-05-03 DIAGNOSIS — I10 ESSENTIAL HYPERTENSION: ICD-10-CM

## 2019-05-03 RX ORDER — LISINOPRIL 20 MG/1
TABLET ORAL
Qty: 30 TABLET | Refills: 5 | Status: SHIPPED | OUTPATIENT
Start: 2019-05-03 | End: 2019-08-29 | Stop reason: SDUPTHER

## 2019-05-07 DIAGNOSIS — I10 ESSENTIAL HYPERTENSION: ICD-10-CM

## 2019-05-10 DIAGNOSIS — E11.9 DIABETES MELLITUS WITHOUT COMPLICATION: ICD-10-CM

## 2019-05-10 DIAGNOSIS — Z12.11 ENCOUNTER FOR FIT (FECAL IMMUNOCHEMICAL TEST) SCREENING: Primary | ICD-10-CM

## 2019-05-10 RX ORDER — CHLORTHALIDONE 25 MG/1
TABLET ORAL
Qty: 90 TABLET | Refills: 0 | Status: SHIPPED | OUTPATIENT
Start: 2019-05-10 | End: 2019-08-29 | Stop reason: SDUPTHER

## 2019-05-13 ENCOUNTER — OFFICE VISIT (OUTPATIENT)
Dept: PODIATRY | Facility: CLINIC | Age: 54
End: 2019-05-13
Payer: COMMERCIAL

## 2019-05-13 VITALS
DIASTOLIC BLOOD PRESSURE: 96 MMHG | BODY MASS INDEX: 31.21 KG/M2 | SYSTOLIC BLOOD PRESSURE: 150 MMHG | HEIGHT: 75 IN | WEIGHT: 251 LBS

## 2019-05-13 DIAGNOSIS — E11.49 TYPE II DIABETES MELLITUS WITH NEUROLOGICAL MANIFESTATIONS: Primary | ICD-10-CM

## 2019-05-13 DIAGNOSIS — M20.5X1 HALLUX LIMITUS, ACQUIRED, RIGHT: ICD-10-CM

## 2019-05-13 DIAGNOSIS — M20.42 HAMMER TOES OF BOTH FEET: ICD-10-CM

## 2019-05-13 DIAGNOSIS — M20.5X2 HALLUX LIMITUS, ACQUIRED, LEFT: ICD-10-CM

## 2019-05-13 DIAGNOSIS — M20.41 HAMMER TOES OF BOTH FEET: ICD-10-CM

## 2019-05-13 DIAGNOSIS — M21.6X1 PLANTARFLEXION DEFORMITY OF RIGHT FOOT: ICD-10-CM

## 2019-05-13 DIAGNOSIS — L84 PRE-ULCERATIVE CALLUSES: ICD-10-CM

## 2019-05-13 PROCEDURE — 3080F PR MOST RECENT DIASTOLIC BLOOD PRESSURE >= 90 MM HG: ICD-10-PCS | Mod: CPTII,S$GLB,, | Performed by: PODIATRIST

## 2019-05-13 PROCEDURE — 99212 OFFICE O/P EST SF 10 MIN: CPT | Mod: S$GLB,,, | Performed by: PODIATRIST

## 2019-05-13 PROCEDURE — 99212 PR OFFICE/OUTPT VISIT, EST, LEVL II, 10-19 MIN: ICD-10-PCS | Mod: S$GLB,,, | Performed by: PODIATRIST

## 2019-05-13 PROCEDURE — 3077F PR MOST RECENT SYSTOLIC BLOOD PRESSURE >= 140 MM HG: ICD-10-PCS | Mod: CPTII,S$GLB,, | Performed by: PODIATRIST

## 2019-05-13 PROCEDURE — 3008F BODY MASS INDEX DOCD: CPT | Mod: CPTII,S$GLB,, | Performed by: PODIATRIST

## 2019-05-13 PROCEDURE — 3008F PR BODY MASS INDEX (BMI) DOCUMENTED: ICD-10-PCS | Mod: CPTII,S$GLB,, | Performed by: PODIATRIST

## 2019-05-13 PROCEDURE — 3077F SYST BP >= 140 MM HG: CPT | Mod: CPTII,S$GLB,, | Performed by: PODIATRIST

## 2019-05-13 PROCEDURE — 99999 PR PBB SHADOW E&M-EST. PATIENT-LVL III: CPT | Mod: PBBFAC,,, | Performed by: PODIATRIST

## 2019-05-13 PROCEDURE — 99999 PR PBB SHADOW E&M-EST. PATIENT-LVL III: ICD-10-PCS | Mod: PBBFAC,,, | Performed by: PODIATRIST

## 2019-05-13 PROCEDURE — 3080F DIAST BP >= 90 MM HG: CPT | Mod: CPTII,S$GLB,, | Performed by: PODIATRIST
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
## 2019-05-17 ENCOUNTER — PATIENT OUTREACH (OUTPATIENT)
Dept: ADMINISTRATIVE | Facility: HOSPITAL | Age: 54
End: 2019-05-17

## 2019-05-17 ENCOUNTER — DOCUMENTATION ONLY (OUTPATIENT)
Dept: ADMINISTRATIVE | Facility: HOSPITAL | Age: 54
End: 2019-05-17

## 2019-05-27 ENCOUNTER — LAB VISIT (OUTPATIENT)
Dept: LAB | Facility: HOSPITAL | Age: 54
End: 2019-05-27
Attending: FAMILY MEDICINE
Payer: COMMERCIAL

## 2019-05-27 DIAGNOSIS — E11.9 TYPE 2 DIABETES MELLITUS WITHOUT COMPLICATION: ICD-10-CM

## 2019-05-27 DIAGNOSIS — I10 ESSENTIAL HYPERTENSION: ICD-10-CM

## 2019-05-27 LAB
ALBUMIN SERPL BCP-MCNC: 2.9 G/DL (ref 3.5–5.2)
ALP SERPL-CCNC: 244 U/L (ref 55–135)
ALT SERPL W/O P-5'-P-CCNC: 43 U/L (ref 10–44)
ANION GAP SERPL CALC-SCNC: 5 MMOL/L (ref 8–16)
AST SERPL-CCNC: 37 U/L (ref 10–40)
BILIRUB SERPL-MCNC: 0.3 MG/DL (ref 0.1–1)
BUN SERPL-MCNC: 38 MG/DL (ref 6–20)
CALCIUM SERPL-MCNC: 9.5 MG/DL (ref 8.7–10.5)
CHLORIDE SERPL-SCNC: 103 MMOL/L (ref 95–110)
CHOLEST SERPL-MCNC: 230 MG/DL (ref 120–199)
CHOLEST/HDLC SERPL: 4.7 {RATIO} (ref 2–5)
CO2 SERPL-SCNC: 28 MMOL/L (ref 23–29)
CREAT SERPL-MCNC: 2.5 MG/DL (ref 0.5–1.4)
EST. GFR  (AFRICAN AMERICAN): 32.2 ML/MIN/1.73 M^2
EST. GFR  (NON AFRICAN AMERICAN): 27.9 ML/MIN/1.73 M^2
ESTIMATED AVG GLUCOSE: 197 MG/DL (ref 68–131)
GLUCOSE SERPL-MCNC: 250 MG/DL (ref 70–110)
HBA1C MFR BLD HPLC: 8.5 % (ref 4–5.6)
HDLC SERPL-MCNC: 49 MG/DL (ref 40–75)
HDLC SERPL: 21.3 % (ref 20–50)
LDLC SERPL CALC-MCNC: 158.4 MG/DL (ref 63–159)
NONHDLC SERPL-MCNC: 181 MG/DL
POTASSIUM SERPL-SCNC: 4.6 MMOL/L (ref 3.5–5.1)
PROT SERPL-MCNC: 7.2 G/DL (ref 6–8.4)
SODIUM SERPL-SCNC: 136 MMOL/L (ref 136–145)
TRIGL SERPL-MCNC: 113 MG/DL (ref 30–150)

## 2019-05-27 PROCEDURE — 36415 COLL VENOUS BLD VENIPUNCTURE: CPT | Mod: PO

## 2019-05-27 PROCEDURE — 80053 COMPREHEN METABOLIC PANEL: CPT

## 2019-05-27 PROCEDURE — 83036 HEMOGLOBIN GLYCOSYLATED A1C: CPT

## 2019-05-27 PROCEDURE — 80061 LIPID PANEL: CPT

## 2019-05-28 RX ORDER — DULAGLUTIDE 0.75 MG/.5ML
INJECTION, SOLUTION SUBCUTANEOUS
Qty: 2 ML | Refills: 0 | Status: SHIPPED | OUTPATIENT
Start: 2019-05-28 | End: 2019-05-31 | Stop reason: SDUPTHER

## 2019-05-31 ENCOUNTER — OFFICE VISIT (OUTPATIENT)
Dept: FAMILY MEDICINE | Facility: CLINIC | Age: 54
End: 2019-05-31
Payer: COMMERCIAL

## 2019-05-31 VITALS
BODY MASS INDEX: 32.73 KG/M2 | HEIGHT: 75 IN | SYSTOLIC BLOOD PRESSURE: 134 MMHG | OXYGEN SATURATION: 97 % | RESPIRATION RATE: 17 BRPM | TEMPERATURE: 98 F | DIASTOLIC BLOOD PRESSURE: 82 MMHG | WEIGHT: 263.25 LBS | HEART RATE: 80 BPM

## 2019-05-31 DIAGNOSIS — N18.30 CKD (CHRONIC KIDNEY DISEASE), STAGE III: ICD-10-CM

## 2019-05-31 DIAGNOSIS — Z12.11 SCREENING FOR COLON CANCER: ICD-10-CM

## 2019-05-31 DIAGNOSIS — E78.00 PURE HYPERCHOLESTEROLEMIA: ICD-10-CM

## 2019-05-31 DIAGNOSIS — I10 ESSENTIAL HYPERTENSION: ICD-10-CM

## 2019-05-31 DIAGNOSIS — Z00.00 WELL ADULT EXAM: Primary | ICD-10-CM

## 2019-05-31 PROCEDURE — 3045F PR MOST RECENT HEMOGLOBIN A1C LEVEL 7.0-9.0%: ICD-10-PCS | Mod: CPTII,S$GLB,, | Performed by: FAMILY MEDICINE

## 2019-05-31 PROCEDURE — 99999 PR PBB SHADOW E&M-EST. PATIENT-LVL III: ICD-10-PCS | Mod: PBBFAC,,, | Performed by: FAMILY MEDICINE

## 2019-05-31 PROCEDURE — 3075F SYST BP GE 130 - 139MM HG: CPT | Mod: CPTII,S$GLB,, | Performed by: FAMILY MEDICINE

## 2019-05-31 PROCEDURE — 3079F PR MOST RECENT DIASTOLIC BLOOD PRESSURE 80-89 MM HG: ICD-10-PCS | Mod: CPTII,S$GLB,, | Performed by: FAMILY MEDICINE

## 2019-05-31 PROCEDURE — 99396 PR PREVENTIVE VISIT,EST,40-64: ICD-10-PCS | Mod: S$GLB,,, | Performed by: FAMILY MEDICINE

## 2019-05-31 PROCEDURE — 99396 PREV VISIT EST AGE 40-64: CPT | Mod: S$GLB,,, | Performed by: FAMILY MEDICINE

## 2019-05-31 PROCEDURE — 3045F PR MOST RECENT HEMOGLOBIN A1C LEVEL 7.0-9.0%: CPT | Mod: CPTII,S$GLB,, | Performed by: FAMILY MEDICINE

## 2019-05-31 PROCEDURE — 3079F DIAST BP 80-89 MM HG: CPT | Mod: CPTII,S$GLB,, | Performed by: FAMILY MEDICINE

## 2019-05-31 PROCEDURE — 99999 PR PBB SHADOW E&M-EST. PATIENT-LVL III: CPT | Mod: PBBFAC,,, | Performed by: FAMILY MEDICINE

## 2019-05-31 PROCEDURE — 3075F PR MOST RECENT SYSTOLIC BLOOD PRESS GE 130-139MM HG: ICD-10-PCS | Mod: CPTII,S$GLB,, | Performed by: FAMILY MEDICINE

## 2019-05-31 RX ORDER — DILTIAZEM HYDROCHLORIDE 240 MG/1
CAPSULE, COATED, EXTENDED RELEASE ORAL
Qty: 90 CAPSULE | Refills: 0 | Status: SHIPPED | OUTPATIENT
Start: 2019-05-31 | End: 2019-08-29 | Stop reason: SDUPTHER

## 2019-05-31 NOTE — PROGRESS NOTES
Chief Complaint   Patient presents with    Annual Exam    Diabetes       Catalino Mcfadden is a 54 y.o. male who presents per the Chief Complaint.  Pt is known to me and was last seen by me on 4/25/2018.  All known chronic medical issues have been documented.       Diabetes   He presents for his follow-up diabetic visit. He has type 2 diabetes mellitus. No MedicAlert identification noted. The initial diagnosis of diabetes was made 20 years ago. His disease course has been improving. Pertinent negatives for hypoglycemia include no confusion, dizziness, headaches, hunger, mood changes, nervousness/anxiousness, pallor, seizures, sleepiness, speech difficulty, sweats or tremors. Associated symptoms include foot paresthesias. Pertinent negatives for diabetes include no blurred vision, no chest pain, no fatigue, no foot ulcerations, no polydipsia, no polyphagia, no polyuria, no visual change, no weakness and no weight loss. There are no hypoglycemic complications. Pertinent negatives for hypoglycemia complications include no blackouts, no hospitalization, no nocturnal hypoglycemia, no required assistance and no required glucagon injection. Symptoms are improving. Diabetic complications include nephropathy, peripheral neuropathy and retinopathy. Pertinent negatives for diabetic complications include no autonomic neuropathy, CVA, heart disease or PVD. Risk factors for coronary artery disease include hypertension, obesity, sedentary lifestyle and diabetes mellitus. Current diabetic treatment includes diet and oral agent (dual therapy). He is compliant with treatment all of the time. His weight is decreasing steadily. He is following a generally healthy, low fat/cholesterol and low salt diet. Meal planning includes avoidance of concentrated sweets. He has not had a previous visit with a dietitian. He participates in exercise intermittently. He monitors blood glucose at home 1-2 x per day. He monitors urine at home <1 x per month.  Blood glucose monitoring compliance is fair. His home blood glucose trend is decreasing steadily. An ACE inhibitor/angiotensin II receptor blocker is being taken. He sees a podiatrist.Eye exam is not current.   Hypertension   This is a chronic problem. The current episode started more than 1 year ago. The problem is unchanged. The problem is controlled. Associated symptoms include neck pain (improved). Pertinent negatives include no anxiety, blurred vision, chest pain, headaches, palpitations, peripheral edema, shortness of breath or sweats. There are no associated agents to hypertension. Risk factors for coronary artery disease include diabetes mellitus, dyslipidemia, male gender and obesity. Past treatments include beta blockers, calcium channel blockers, diuretics and ACE inhibitors. The current treatment provides moderate improvement. There are no compliance problems.  Hypertensive end-organ damage includes kidney disease and retinopathy. There is no history of angina, CAD/MI, CVA, heart failure, left ventricular hypertrophy or PVD. Identifiable causes of hypertension include chronic renal disease. There is no history of coarctation of the aorta, a hypertension causing med, sleep apnea or a thyroid problem.        ROS  Review of Systems   Constitutional: Negative for activity change, appetite change, chills, fatigue, fever, unexpected weight change and weight loss.   HENT: Negative for congestion, ear pain, hearing loss, postnasal drip, rhinorrhea, sinus pressure, sore throat and trouble swallowing.    Eyes: Negative for blurred vision, pain, discharge and visual disturbance.   Respiratory: Negative for cough, chest tightness, shortness of breath and wheezing.    Cardiovascular: Negative for chest pain and palpitations.   Gastrointestinal: Negative for abdominal pain, blood in stool, constipation, diarrhea, nausea and vomiting.   Endocrine: Negative for polydipsia, polyphagia and polyuria.   Genitourinary:  "Negative for difficulty urinating, dysuria, flank pain, frequency, hematuria, penile pain, penile swelling, scrotal swelling, testicular pain and urgency.   Musculoskeletal: Positive for neck pain (improved). Negative for arthralgias, back pain, joint swelling, myalgias and neck stiffness.   Skin: Negative.  Negative for pallor.   Allergic/Immunologic: Negative for environmental allergies, food allergies and immunocompromised state.   Neurological: Negative for dizziness, tremors, seizures, speech difficulty, weakness, light-headedness and headaches.   Psychiatric/Behavioral: Negative.  Negative for confusion and dysphoric mood. The patient is not nervous/anxious.        Physical Exam  Vitals:    05/31/19 0904   BP: 134/82   Pulse: 80   Resp: 17   Temp: 98.4 °F (36.9 °C)    Body mass index is 32.9 kg/m².  Weight: 119.4 kg (263 lb 3.7 oz)   Height: 6' 3" (190.5 cm)     Physical Exam   Constitutional: He is oriented to person, place, and time. He appears well-developed and well-nourished. He is active and cooperative.  Non-toxic appearance. He does not have a sickly appearance. He does not appear ill. No distress.   HENT:   Head: Normocephalic and atraumatic.   Right Ear: Hearing and external ear normal. No decreased hearing is noted.   Left Ear: Hearing and external ear normal. No decreased hearing is noted.   Nose: Nose normal. No rhinorrhea or nasal deformity.   Mouth/Throat: Uvula is midline and oropharynx is clear and moist. He does not have dentures. Normal dentition.   Eyes: Pupils are equal, round, and reactive to light. Conjunctivae, EOM and lids are normal. Right eye exhibits no chemosis, no discharge and no exudate. No foreign body present in the right eye. Left eye exhibits no chemosis, no discharge and no exudate. No foreign body present in the left eye. No scleral icterus.   Neck: Normal range of motion and full passive range of motion without pain. Neck supple.   Cardiovascular: Normal rate, regular " rhythm, S1 normal, S2 normal and normal heart sounds. Exam reveals no gallop and no friction rub.   No murmur heard.  Pulmonary/Chest: Effort normal and breath sounds normal. No accessory muscle usage. No respiratory distress. He has no decreased breath sounds. He has no wheezes. He has no rhonchi. He has no rales.   Abdominal: Soft. Normal appearance. He exhibits no distension. There is no hepatosplenomegaly. There is no tenderness. There is no rigidity, no rebound and no guarding.   Musculoskeletal: Normal range of motion.        Cervical back: He exhibits tenderness and pain. He exhibits normal range of motion, no bony tenderness, no swelling, no edema, no deformity, no laceration, no spasm and normal pulse.   Neurological: He is alert and oriented to person, place, and time. He has normal strength. No cranial nerve deficit or sensory deficit. He exhibits normal muscle tone. He displays no seizure activity. Coordination and gait normal.   Skin: Skin is warm, dry and intact. No rash noted. He is not diaphoretic.   Psychiatric: He has a normal mood and affect. His speech is normal and behavior is normal. Judgment and thought content normal. Cognition and memory are normal. He is attentive.       Assessment & Plan    Discussion of plan of care including treatment options regarding health and wellness were reviewed and discussed with patient.  Any changes to medication or treatment plan, as well as any screening blood test, imaging, or referrals to specialist, are documented.  Follow up as indicated.     1. Well adult exam  Discussed age and gender appropriate screenings at this visit and encouraged a healthy diet with low saturated fats, and increased physical activity.  Counseled on medically appropriate vaccines based on age and current health condition.  Screening test will be ordered and once completed, patient will be notified of results when available.  If necessary, will follow up to discuss and manage  further.   - PSA, Screening; Future  - TSH; Future  - T4, free; Future  - Vitamin D; Future  - CBC auto differential; Future    2. Uncontrolled type 2 diabetes mellitus with stage 3 chronic kidney disease, without long-term current use of insulin  Patient is encouraged to follow a diet low in carbohydrates and simple sugars.  Discussed simple vs. complex carbohydrates as well as eating times of certain meals. Advised to focus on good food choices and increased physical activity and encouraged to adhere to medication regimen and/or lifestyle adjustments, and to check glucose level as recommended.  Contact office if glucose levels are not improving over time.  Screening blood test is due in 3 months.     - dulaglutide (TRULICITY) 0.75 mg/0.5 mL PnIj; INJECT 0.5 ML UNDER THE SKIN EVERY 7 DAYS  Dispense: 2 mL; Refill: 1    3. Essential hypertension  Patient was counseled and encouraged to maintain a low sodium diet, as well as increasing physical activity.  Recommend random BP checks at home on a regular basis.  Repeat BP at end of visit was not necessary. Will continue medication at this time, and follow up in 3-6 months, or sooner if blood pressure begins to increase.     - diltiaZEM (CARTIA XT) 240 MG 24 hr capsule; TAKE 1 CAPSULE(240 MG) BY MOUTH EVERY DAY  Dispense: 90 capsule; Refill: 0    4. Pure hypercholesterolemia  We discussed ways to manage cholesterol levels, including increasing whole grain foods and decreasing fried and fatty foods.  I also recommended OTC Omega 3 and Omega 6 supplements to improve overall cholesterol levels.  Will not start therapy at this visit; patient is not due for screening blood test at this time.     5. CKD (chronic kidney disease), stage III  Stable; encouraged patient to avoid NSAID medications and to increase water and clear liquid hydration.  Will continue to monitor for decline and refer as necessary.     6. Screening for colon cancer  FitKit was given to patient on 5/31/2019  10:03 AM   - Fecal Immunochemical Test (iFOBT); Future       Follow up in about 3 months (around 8/31/2019), or if symptoms worsen or fail to improve.        ACTIVE MEDICAL ISSUES:  Documented in Problem List    PAST MEDICAL HISTORY  Documented    PAST SURGICAL HISTORY:  Documented    SOCIAL HISTORY:  Documented    FAMILY HISTORY:  Documented    ALLERGIES AND MEDICATIONS: updated and reviewed.  Documented    Health Maintenance       Date Due Completion Date    TETANUS VACCINE 04/04/1982 ---    Low Dose Statin 04/04/1985 ---    Fecal Occult Blood Test (FOBT)/FitKit 10/03/2018 10/3/2017    Override on 12/9/2016: Done ([facility])    Eye Exam 04/03/2019 4/3/2018    Override on 2/23/2018: Done ([facility] fax [phone])    Override on 9/26/2014: Done (future)    Influenza Vaccine 08/01/2019 7/6/2015 (Declined)    Override on 7/6/2015: Declined (not at this present time)    Hemoglobin A1c 08/27/2019 5/27/2019    Override on 5/23/2016: Done    Override on 7/6/2015: Done (future)    Foot Exam 03/04/2020 3/4/2019 (Done)    Override on 3/4/2019: Done    Override on 10/24/2018: Done    Override on 4/3/2018: Done    Override on 11/28/2017: Done    Override on 9/20/2017: Done    Override on 7/6/2015: Done (today in ov)    Lipid Panel 05/27/2020 5/27/2019

## 2019-06-03 ENCOUNTER — LAB VISIT (OUTPATIENT)
Dept: LAB | Facility: HOSPITAL | Age: 54
End: 2019-06-03
Attending: FAMILY MEDICINE
Payer: COMMERCIAL

## 2019-06-03 DIAGNOSIS — Z12.11 SCREENING FOR COLON CANCER: ICD-10-CM

## 2019-06-03 PROCEDURE — 82274 ASSAY TEST FOR BLOOD FECAL: CPT

## 2019-06-06 LAB — HEMOCCULT STL QL IA: NEGATIVE

## 2019-06-07 ENCOUNTER — LAB VISIT (OUTPATIENT)
Dept: LAB | Facility: HOSPITAL | Age: 54
End: 2019-06-07
Attending: FAMILY MEDICINE
Payer: COMMERCIAL

## 2019-06-07 DIAGNOSIS — Z00.00 WELL ADULT EXAM: ICD-10-CM

## 2019-06-07 LAB
25(OH)D3+25(OH)D2 SERPL-MCNC: 23 NG/ML (ref 30–96)
BASOPHILS # BLD AUTO: 0.04 K/UL (ref 0–0.2)
BASOPHILS NFR BLD: 0.7 % (ref 0–1.9)
COMPLEXED PSA SERPL-MCNC: 1 NG/ML (ref 0–4)
DIFFERENTIAL METHOD: ABNORMAL
EOSINOPHIL # BLD AUTO: 0.2 K/UL (ref 0–0.5)
EOSINOPHIL NFR BLD: 3 % (ref 0–8)
ERYTHROCYTE [DISTWIDTH] IN BLOOD BY AUTOMATED COUNT: 13 % (ref 11.5–14.5)
HCT VFR BLD AUTO: 42.8 % (ref 40–54)
HGB BLD-MCNC: 13.6 G/DL (ref 14–18)
IMM GRANULOCYTES # BLD AUTO: 0.01 K/UL (ref 0–0.04)
IMM GRANULOCYTES NFR BLD AUTO: 0.2 % (ref 0–0.5)
LYMPHOCYTES # BLD AUTO: 1.4 K/UL (ref 1–4.8)
LYMPHOCYTES NFR BLD: 25.2 % (ref 18–48)
MCH RBC QN AUTO: 26.9 PG (ref 27–31)
MCHC RBC AUTO-ENTMCNC: 31.8 G/DL (ref 32–36)
MCV RBC AUTO: 85 FL (ref 82–98)
MONOCYTES # BLD AUTO: 0.6 K/UL (ref 0.3–1)
MONOCYTES NFR BLD: 10.6 % (ref 4–15)
NEUTROPHILS # BLD AUTO: 3.4 K/UL (ref 1.8–7.7)
NEUTROPHILS NFR BLD: 60.3 % (ref 38–73)
NRBC BLD-RTO: 0 /100 WBC
PLATELET # BLD AUTO: 239 K/UL (ref 150–350)
PMV BLD AUTO: 10.5 FL (ref 9.2–12.9)
RBC # BLD AUTO: 5.05 M/UL (ref 4.6–6.2)
T4 FREE SERPL-MCNC: 0.97 NG/DL (ref 0.71–1.51)
TSH SERPL DL<=0.005 MIU/L-ACNC: 0.97 UIU/ML (ref 0.4–4)
WBC # BLD AUTO: 5.67 K/UL (ref 3.9–12.7)

## 2019-06-07 PROCEDURE — 85025 COMPLETE CBC W/AUTO DIFF WBC: CPT

## 2019-06-07 PROCEDURE — 84443 ASSAY THYROID STIM HORMONE: CPT

## 2019-06-07 PROCEDURE — 84153 ASSAY OF PSA TOTAL: CPT

## 2019-06-07 PROCEDURE — 36415 COLL VENOUS BLD VENIPUNCTURE: CPT | Mod: PO

## 2019-06-07 PROCEDURE — 84439 ASSAY OF FREE THYROXINE: CPT

## 2019-06-07 PROCEDURE — 82306 VITAMIN D 25 HYDROXY: CPT

## 2019-06-21 ENCOUNTER — TELEPHONE (OUTPATIENT)
Dept: FAMILY MEDICINE | Facility: CLINIC | Age: 54
End: 2019-06-21

## 2019-06-21 DIAGNOSIS — E78.00 PURE HYPERCHOLESTEROLEMIA: ICD-10-CM

## 2019-06-21 RX ORDER — ATORVASTATIN CALCIUM 40 MG/1
40 TABLET, FILM COATED ORAL DAILY
Qty: 90 TABLET | Refills: 0 | Status: SHIPPED | OUTPATIENT
Start: 2019-06-21 | End: 2019-07-30 | Stop reason: SDUPTHER

## 2019-07-30 DIAGNOSIS — E78.00 PURE HYPERCHOLESTEROLEMIA: ICD-10-CM

## 2019-07-30 RX ORDER — ATORVASTATIN CALCIUM 40 MG/1
40 TABLET, FILM COATED ORAL DAILY
Qty: 90 TABLET | Refills: 3 | Status: SHIPPED | OUTPATIENT
Start: 2019-07-30 | End: 2020-08-31 | Stop reason: SDUPTHER

## 2019-08-27 ENCOUNTER — TELEPHONE (OUTPATIENT)
Dept: FAMILY MEDICINE | Facility: CLINIC | Age: 54
End: 2019-08-27

## 2019-08-27 NOTE — TELEPHONE ENCOUNTER
----- Message from Vicki Johnson sent at 8/27/2019  1:33 PM CDT -----  Contact: Self [phone]  Type: RX Refill Request    Who Called: Self    Refill or New Rx:Refill    RX Name and Strength: dulaglutide (TRULICITY) 0.75 mg/0.5 mL MelinaIj    Preferred Pharmacy with phone number:  [id number]  [place], [place] [address]  [address] [place] [address] [address] [place] [address]  Phone: [phone] Fax: [phone]    Local or Mail Order:Local    Would the patient rather a call back or a response via [facility]? Call back    Best Call Back Number:[phone]

## 2019-08-28 ENCOUNTER — LAB VISIT (OUTPATIENT)
Dept: LAB | Facility: HOSPITAL | Age: 54
End: 2019-08-28
Attending: FAMILY MEDICINE
Payer: COMMERCIAL

## 2019-08-28 DIAGNOSIS — E78.00 PURE HYPERCHOLESTEROLEMIA: ICD-10-CM

## 2019-08-28 LAB
ALBUMIN SERPL BCP-MCNC: 3 G/DL (ref 3.5–5.2)
ALP SERPL-CCNC: 297 U/L (ref 55–135)
ALT SERPL W/O P-5'-P-CCNC: 41 U/L (ref 10–44)
ANION GAP SERPL CALC-SCNC: 8 MMOL/L (ref 8–16)
AST SERPL-CCNC: 32 U/L (ref 10–40)
BILIRUB SERPL-MCNC: 0.5 MG/DL (ref 0.1–1)
BUN SERPL-MCNC: 48 MG/DL (ref 6–20)
CALCIUM SERPL-MCNC: 9.6 MG/DL (ref 8.7–10.5)
CHLORIDE SERPL-SCNC: 101 MMOL/L (ref 95–110)
CHOLEST SERPL-MCNC: 173 MG/DL (ref 120–199)
CHOLEST/HDLC SERPL: 3.8 {RATIO} (ref 2–5)
CO2 SERPL-SCNC: 27 MMOL/L (ref 23–29)
CREAT SERPL-MCNC: 2.7 MG/DL (ref 0.5–1.4)
EST. GFR  (AFRICAN AMERICAN): 29.3 ML/MIN/1.73 M^2
EST. GFR  (NON AFRICAN AMERICAN): 25.4 ML/MIN/1.73 M^2
ESTIMATED AVG GLUCOSE: 209 MG/DL (ref 68–131)
GLUCOSE SERPL-MCNC: 198 MG/DL (ref 70–110)
HBA1C MFR BLD HPLC: 8.9 % (ref 4–5.6)
HDLC SERPL-MCNC: 45 MG/DL (ref 40–75)
HDLC SERPL: 26 % (ref 20–50)
LDLC SERPL CALC-MCNC: 109.6 MG/DL (ref 63–159)
NONHDLC SERPL-MCNC: 128 MG/DL
POTASSIUM SERPL-SCNC: 4.8 MMOL/L (ref 3.5–5.1)
PROT SERPL-MCNC: 7.5 G/DL (ref 6–8.4)
SODIUM SERPL-SCNC: 136 MMOL/L (ref 136–145)
TRIGL SERPL-MCNC: 92 MG/DL (ref 30–150)

## 2019-08-28 PROCEDURE — 83036 HEMOGLOBIN GLYCOSYLATED A1C: CPT

## 2019-08-28 PROCEDURE — 36415 COLL VENOUS BLD VENIPUNCTURE: CPT | Mod: PO

## 2019-08-28 PROCEDURE — 80061 LIPID PANEL: CPT

## 2019-08-28 PROCEDURE — 80053 COMPREHEN METABOLIC PANEL: CPT

## 2019-08-29 ENCOUNTER — OFFICE VISIT (OUTPATIENT)
Dept: FAMILY MEDICINE | Facility: CLINIC | Age: 54
End: 2019-08-29
Payer: COMMERCIAL

## 2019-08-29 VITALS
HEIGHT: 75 IN | OXYGEN SATURATION: 97 % | TEMPERATURE: 98 F | WEIGHT: 263.44 LBS | HEART RATE: 75 BPM | BODY MASS INDEX: 32.75 KG/M2 | DIASTOLIC BLOOD PRESSURE: 100 MMHG | RESPIRATION RATE: 20 BRPM | SYSTOLIC BLOOD PRESSURE: 158 MMHG

## 2019-08-29 DIAGNOSIS — N18.30 CKD (CHRONIC KIDNEY DISEASE), STAGE III: ICD-10-CM

## 2019-08-29 DIAGNOSIS — I10 ESSENTIAL HYPERTENSION: ICD-10-CM

## 2019-08-29 PROCEDURE — 3077F SYST BP >= 140 MM HG: CPT | Mod: CPTII,S$GLB,, | Performed by: FAMILY MEDICINE

## 2019-08-29 PROCEDURE — 3080F PR MOST RECENT DIASTOLIC BLOOD PRESSURE >= 90 MM HG: ICD-10-PCS | Mod: CPTII,S$GLB,, | Performed by: FAMILY MEDICINE

## 2019-08-29 PROCEDURE — 99214 OFFICE O/P EST MOD 30 MIN: CPT | Mod: S$GLB,,, | Performed by: FAMILY MEDICINE

## 2019-08-29 PROCEDURE — 3045F PR MOST RECENT HEMOGLOBIN A1C LEVEL 7.0-9.0%: CPT | Mod: CPTII,S$GLB,, | Performed by: FAMILY MEDICINE

## 2019-08-29 PROCEDURE — 99999 PR PBB SHADOW E&M-EST. PATIENT-LVL III: ICD-10-PCS | Mod: PBBFAC,,, | Performed by: FAMILY MEDICINE

## 2019-08-29 PROCEDURE — 3077F PR MOST RECENT SYSTOLIC BLOOD PRESSURE >= 140 MM HG: ICD-10-PCS | Mod: CPTII,S$GLB,, | Performed by: FAMILY MEDICINE

## 2019-08-29 PROCEDURE — 3008F PR BODY MASS INDEX (BMI) DOCUMENTED: ICD-10-PCS | Mod: CPTII,S$GLB,, | Performed by: FAMILY MEDICINE

## 2019-08-29 PROCEDURE — 99999 PR PBB SHADOW E&M-EST. PATIENT-LVL III: CPT | Mod: PBBFAC,,, | Performed by: FAMILY MEDICINE

## 2019-08-29 PROCEDURE — 99214 PR OFFICE/OUTPT VISIT, EST, LEVL IV, 30-39 MIN: ICD-10-PCS | Mod: S$GLB,,, | Performed by: FAMILY MEDICINE

## 2019-08-29 PROCEDURE — 3008F BODY MASS INDEX DOCD: CPT | Mod: CPTII,S$GLB,, | Performed by: FAMILY MEDICINE

## 2019-08-29 PROCEDURE — 3080F DIAST BP >= 90 MM HG: CPT | Mod: CPTII,S$GLB,, | Performed by: FAMILY MEDICINE

## 2019-08-29 PROCEDURE — 3045F PR MOST RECENT HEMOGLOBIN A1C LEVEL 7.0-9.0%: ICD-10-PCS | Mod: CPTII,S$GLB,, | Performed by: FAMILY MEDICINE

## 2019-08-29 RX ORDER — DILTIAZEM HYDROCHLORIDE 240 MG/1
CAPSULE, COATED, EXTENDED RELEASE ORAL
Qty: 90 CAPSULE | Refills: 1 | Status: SHIPPED | OUTPATIENT
Start: 2019-08-29 | End: 2020-01-03

## 2019-08-29 RX ORDER — CHLORTHALIDONE 25 MG/1
25 TABLET ORAL DAILY
Qty: 90 TABLET | Refills: 1 | Status: SHIPPED | OUTPATIENT
Start: 2019-08-29 | End: 2020-05-18

## 2019-08-29 RX ORDER — LISINOPRIL 20 MG/1
20 TABLET ORAL DAILY
Qty: 90 TABLET | Refills: 1 | Status: SHIPPED | OUTPATIENT
Start: 2019-08-29 | End: 2019-10-30

## 2019-08-29 NOTE — PROGRESS NOTES
Chief Complaint   Patient presents with    Hypertension     follow up     Diabetes     follow up     Results     follow up on recent labs     Diabetic Foot Exam       Catalino Mcfadden is a 54 y.o. male who presents per the Chief Complaint.  Pt is known to me and was last seen by me on 5/31/2019.  All known chronic medical issues have been documented.       Hypertension   This is a chronic problem. The current episode started more than 1 year ago. The problem is unchanged. The problem is controlled. Associated symptoms include neck pain (improved). Pertinent negatives include no anxiety, blurred vision, chest pain, headaches, palpitations, peripheral edema, shortness of breath or sweats. There are no associated agents to hypertension. Risk factors for coronary artery disease include diabetes mellitus, dyslipidemia, male gender and obesity. Past treatments include beta blockers, calcium channel blockers, diuretics and ACE inhibitors. The current treatment provides moderate improvement. There are no compliance problems.  Hypertensive end-organ damage includes kidney disease and retinopathy. There is no history of angina, CAD/MI, CVA, heart failure, left ventricular hypertrophy or PVD. Identifiable causes of hypertension include chronic renal disease. There is no history of coarctation of the aorta, a hypertension causing med, sleep apnea or a thyroid problem.   Diabetes   He presents for his follow-up diabetic visit. He has type 2 diabetes mellitus. No MedicAlert identification noted. The initial diagnosis of diabetes was made 20 years ago. His disease course has been improving. Pertinent negatives for hypoglycemia include no confusion, dizziness, headaches, hunger, mood changes, nervousness/anxiousness, pallor, seizures, sleepiness, speech difficulty, sweats or tremors. Associated symptoms include foot paresthesias. Pertinent negatives for diabetes include no blurred vision, no chest pain, no fatigue, no foot  ulcerations, no polydipsia, no polyphagia, no polyuria, no visual change, no weakness and no weight loss. There are no hypoglycemic complications. Pertinent negatives for hypoglycemia complications include no blackouts, no hospitalization, no nocturnal hypoglycemia, no required assistance and no required glucagon injection. Symptoms are improving. Diabetic complications include nephropathy, peripheral neuropathy and retinopathy. Pertinent negatives for diabetic complications include no autonomic neuropathy, CVA, heart disease or PVD. Risk factors for coronary artery disease include hypertension, obesity, sedentary lifestyle and diabetes mellitus. Current diabetic treatment includes diet and oral agent (dual therapy). He is compliant with treatment all of the time. His weight is decreasing steadily. He is following a generally healthy, low fat/cholesterol and low salt diet. Meal planning includes avoidance of concentrated sweets. He has not had a previous visit with a dietitian. He participates in exercise intermittently. He monitors blood glucose at home 1-2 x per day. He monitors urine at home <1 x per month. Blood glucose monitoring compliance is fair. His home blood glucose trend is decreasing steadily. An ACE inhibitor/angiotensin II receptor blocker is being taken. He sees a podiatrist.Eye exam is not current.        ROS  Review of Systems   Constitutional: Negative for activity change, appetite change, chills, fatigue, fever, unexpected weight change and weight loss.   HENT: Negative for congestion, ear pain, hearing loss, postnasal drip, rhinorrhea, sinus pressure, sore throat and trouble swallowing.    Eyes: Negative for blurred vision, pain, discharge and visual disturbance.   Respiratory: Negative for cough, chest tightness, shortness of breath and wheezing.    Cardiovascular: Negative for chest pain and palpitations.   Gastrointestinal: Negative for abdominal pain, blood in stool, constipation, diarrhea,  "nausea and vomiting.   Endocrine: Negative for polydipsia, polyphagia and polyuria.   Genitourinary: Negative for difficulty urinating, dysuria, flank pain, frequency, hematuria, penile pain, penile swelling, scrotal swelling, testicular pain and urgency.   Musculoskeletal: Positive for neck pain (improved). Negative for arthralgias, back pain, joint swelling, myalgias and neck stiffness.   Skin: Negative.  Negative for pallor.   Allergic/Immunologic: Negative for environmental allergies, food allergies and immunocompromised state.   Neurological: Negative for dizziness, tremors, seizures, speech difficulty, weakness, light-headedness and headaches.   Psychiatric/Behavioral: Negative.  Negative for confusion and dysphoric mood. The patient is not nervous/anxious.        Physical Exam  Vitals:    08/29/19 1113   BP: (!) 158/100   Pulse: 75   Resp: 20   Temp: 98.2 °F (36.8 °C)    Body mass index is 32.93 kg/m².  Weight: 119.5 kg (263 lb 7.2 oz)   Height: 6' 3" (190.5 cm)     Physical Exam   Constitutional: He is oriented to person, place, and time. He appears well-developed and well-nourished. He is active and cooperative.  Non-toxic appearance. He does not have a sickly appearance. He does not appear ill. No distress.   HENT:   Head: Normocephalic and atraumatic.   Right Ear: Hearing and external ear normal. No decreased hearing is noted.   Left Ear: Hearing and external ear normal. No decreased hearing is noted.   Nose: Nose normal. No rhinorrhea or nasal deformity.   Mouth/Throat: Uvula is midline and oropharynx is clear and moist. He does not have dentures. Normal dentition.   Eyes: Pupils are equal, round, and reactive to light. Conjunctivae, EOM and lids are normal. Right eye exhibits no chemosis, no discharge and no exudate. No foreign body present in the right eye. Left eye exhibits no chemosis, no discharge and no exudate. No foreign body present in the left eye. No scleral icterus.   Neck: Normal range of " motion and full passive range of motion without pain. Neck supple.   Cardiovascular: Normal rate, regular rhythm, S1 normal, S2 normal and normal heart sounds. Exam reveals no gallop and no friction rub.   No murmur heard.  Pulmonary/Chest: Effort normal and breath sounds normal. No accessory muscle usage. No respiratory distress. He has no decreased breath sounds. He has no wheezes. He has no rhonchi. He has no rales.   Abdominal: Soft. Normal appearance. He exhibits no distension. There is no hepatosplenomegaly. There is no tenderness. There is no rigidity, no rebound and no guarding.   Musculoskeletal: Normal range of motion.        Cervical back: He exhibits tenderness and pain. He exhibits normal range of motion, no bony tenderness, no swelling, no edema, no deformity, no laceration, no spasm and normal pulse.   Neurological: He is alert and oriented to person, place, and time. He has normal strength. No cranial nerve deficit or sensory deficit. He exhibits normal muscle tone. He displays no seizure activity. Coordination and gait normal.   Skin: Skin is warm, dry and intact. No rash noted. He is not diaphoretic.   Psychiatric: He has a normal mood and affect. His speech is normal and behavior is normal. Judgment and thought content normal. Cognition and memory are normal. He is attentive.       Assessment & Plan    Discussion of plan of care including treatment options regarding health and wellness were reviewed and discussed with patient.  Any changes to medication or treatment plan, as well as any screening blood test, imaging, or referrals to specialist, are documented.  Follow up as indicated.     1. Uncontrolled type 2 diabetes mellitus with stage 3 chronic kidney disease, without long-term current use of insulin  Patient is encouraged to follow a diet low in carbohydrates and simple sugars.  Discussed simple vs. complex carbohydrates as well as eating times of certain meals. Advised to focus on good food  choices and increased physical activity and encouraged to adhere to medication regimen and/or lifestyle adjustments, and to check glucose level as recommended.  Contact office if glucose levels are not improving over time.  Screening blood test is due in 3 months.     - dulaglutide (TRULICITY) 0.75 mg/0.5 mL PnIj; INJECT 0.5 ML UNDER THE SKIN EVERY 7 DAYS  Dispense: 2 mL; Refill: 5  - Hemoglobin A1c; Future  - Basic metabolic panel; Future    2. Essential hypertension  Patient was counseled and encouraged to maintain a low sodium diet, as well as increasing physical activity.  Recommend random BP checks at home on a regular basis.  Repeat BP at end of visit was not improved. Will continue medication at this time, and follow up in 4-6 weeks, or sooner if blood pressure does not improve over time.  Patient had been out of medication; will restart immediately. Patient advised to return for nurse BP check in two weeks.  - chlorthalidone (HYGROTEN) 25 MG Tab; Take 1 tablet (25 mg total) by mouth once daily.  Dispense: 90 tablet; Refill: 1  - diltiaZEM (CARTIA XT) 240 MG 24 hr capsule; TAKE 1 CAPSULE(240 MG) BY MOUTH EVERY DAY  Dispense: 90 capsule; Refill: 1  - lisinopril (PRINIVIL,ZESTRIL) 20 MG tablet; Take 1 tablet (20 mg total) by mouth once daily.  Dispense: 90 tablet; Refill: 1    3. CKD (chronic kidney disease), stage III  Likely related to HTN; Will recheck in 3 months.  - Basic metabolic panel; Future       Follow up in about 3 months (around 11/29/2019), or if symptoms worsen or fail to improve.        ACTIVE MEDICAL ISSUES:  Documented in Problem List    PAST MEDICAL HISTORY  Documented    PAST SURGICAL HISTORY:  Documented    SOCIAL HISTORY:  Documented    FAMILY HISTORY:  Documented    ALLERGIES AND MEDICATIONS: updated and reviewed.  Documented    Health Maintenance       Date Due Completion Date    TETANUS VACCINE 04/04/1982 ---    Shingles Vaccine (1 of 2) 04/04/2014 ---    Foot Exam 06/05/2018 6/5/2017     Eye Exam 04/03/2019 4/3/2018    Influenza Vaccine (1) 09/01/2019 ---    Hemoglobin A1c 11/28/2019 8/28/2019    Fecal Occult Blood Test (FOBT)/FitKit 06/03/2020 6/3/2019    Low Dose Statin 07/30/2020 7/30/2019    Lipid Panel 08/28/2020 8/28/2019

## 2019-08-29 NOTE — PROGRESS NOTES
Health Maintenance Due   Topic     Foot Exam  Foot prep to be examine today if PCP have time       Eye Exam  Seen 5/13/19 with Dr Martinez

## 2019-09-05 RX ORDER — DULAGLUTIDE 0.75 MG/.5ML
INJECTION, SOLUTION SUBCUTANEOUS
Qty: 2 ML | Refills: 0 | OUTPATIENT
Start: 2019-09-05

## 2019-09-09 ENCOUNTER — OFFICE VISIT (OUTPATIENT)
Dept: PODIATRY | Facility: CLINIC | Age: 54
End: 2019-09-09
Payer: COMMERCIAL

## 2019-09-09 VITALS — BODY MASS INDEX: 32.7 KG/M2 | HEIGHT: 75 IN | WEIGHT: 263 LBS

## 2019-09-09 DIAGNOSIS — E11.621 DIABETIC ULCER OF RIGHT MIDFOOT ASSOCIATED WITH TYPE 2 DIABETES MELLITUS, WITH FAT LAYER EXPOSED: ICD-10-CM

## 2019-09-09 DIAGNOSIS — E11.49 TYPE II DIABETES MELLITUS WITH NEUROLOGICAL MANIFESTATIONS: Primary | ICD-10-CM

## 2019-09-09 DIAGNOSIS — L97.412 DIABETIC ULCER OF RIGHT MIDFOOT ASSOCIATED WITH TYPE 2 DIABETES MELLITUS, WITH FAT LAYER EXPOSED: ICD-10-CM

## 2019-09-09 PROCEDURE — 99999 PR PBB SHADOW E&M-EST. PATIENT-LVL III: ICD-10-PCS | Mod: PBBFAC,,, | Performed by: PODIATRIST

## 2019-09-09 PROCEDURE — 3008F BODY MASS INDEX DOCD: CPT | Mod: CPTII,S$GLB,, | Performed by: PODIATRIST

## 2019-09-09 PROCEDURE — 3008F PR BODY MASS INDEX (BMI) DOCUMENTED: ICD-10-PCS | Mod: CPTII,S$GLB,, | Performed by: PODIATRIST

## 2019-09-09 PROCEDURE — 99214 PR OFFICE/OUTPT VISIT, EST, LEVL IV, 30-39 MIN: ICD-10-PCS | Mod: S$GLB,,, | Performed by: PODIATRIST

## 2019-09-09 PROCEDURE — 99999 PR PBB SHADOW E&M-EST. PATIENT-LVL III: CPT | Mod: PBBFAC,,, | Performed by: PODIATRIST

## 2019-09-09 PROCEDURE — 87070 CULTURE OTHR SPECIMN AEROBIC: CPT

## 2019-09-09 PROCEDURE — 87075 CULTR BACTERIA EXCEPT BLOOD: CPT

## 2019-09-09 PROCEDURE — 99214 OFFICE O/P EST MOD 30 MIN: CPT | Mod: S$GLB,,, | Performed by: PODIATRIST

## 2019-09-09 PROCEDURE — 3045F PR MOST RECENT HEMOGLOBIN A1C LEVEL 7.0-9.0%: ICD-10-PCS | Mod: CPTII,S$GLB,, | Performed by: PODIATRIST

## 2019-09-09 PROCEDURE — 3045F PR MOST RECENT HEMOGLOBIN A1C LEVEL 7.0-9.0%: CPT | Mod: CPTII,S$GLB,, | Performed by: PODIATRIST
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
## 2019-09-09 NOTE — PATIENT INSTRUCTIONS
Wound care Instructions:   · Once a day beginning in 48 hours::   ¨ Clean the toe separate from your body with warm running water and antibacterial soap such as dial for five minutes.  ¨ Clean any remaining crust away with soap and water using a cotton-tipped applicator.  ¨ DRY COMPLETELY  ¨ Apply betadine and sugar mix to the wound  ¨ Cover with a breathable bandage until there is no more drainage or open flesh.  · Change the dressing daily, or whenever it becomes wet or dirty.  · If you were prescribed antibiotics, take them as directed until they are all gone.  · Wear comfortable shoes with a lot of toe room, or open-toe sandals, while your toe is healing.  · You may use acetaminophen or ibuprofen to control pain, unless another medicine was prescribed. If you have chronic liver or kidney disease or ever had a stomach ulcer or GI bleeding, talk with your doctor before using these medicines.      When to seek medical advice  Call your health care provider right away if any of the following occur:  · Increasing redness, pain or swelling of the toe  · Red streaks in the skin leading away from the wound  · Continued pus or fluid drainage for more than 24 hours  · Fever of 100.4º F (38º C) or higher, or as directed by your health care provider

## 2019-09-12 LAB — BACTERIA SPEC AEROBE CULT: NORMAL

## 2019-09-16 LAB — BACTERIA SPEC ANAEROBE CULT: NORMAL

## 2019-09-19 ENCOUNTER — PATIENT OUTREACH (OUTPATIENT)
Dept: ADMINISTRATIVE | Facility: OTHER | Age: 54
End: 2019-09-19

## 2019-09-24 ENCOUNTER — OFFICE VISIT (OUTPATIENT)
Dept: PODIATRY | Facility: CLINIC | Age: 54
End: 2019-09-24
Payer: COMMERCIAL

## 2019-09-24 VITALS — WEIGHT: 263 LBS | BODY MASS INDEX: 32.7 KG/M2 | HEIGHT: 75 IN

## 2019-09-24 DIAGNOSIS — E11.621 DIABETIC ULCER OF RIGHT MIDFOOT ASSOCIATED WITH TYPE 2 DIABETES MELLITUS, WITH FAT LAYER EXPOSED: ICD-10-CM

## 2019-09-24 DIAGNOSIS — L97.412 DIABETIC ULCER OF RIGHT MIDFOOT ASSOCIATED WITH TYPE 2 DIABETES MELLITUS, WITH FAT LAYER EXPOSED: ICD-10-CM

## 2019-09-24 DIAGNOSIS — E11.49 TYPE II DIABETES MELLITUS WITH NEUROLOGICAL MANIFESTATIONS: Primary | ICD-10-CM

## 2019-09-24 PROCEDURE — 99999 PR PBB SHADOW E&M-EST. PATIENT-LVL II: ICD-10-PCS | Mod: PBBFAC,,, | Performed by: PODIATRIST

## 2019-09-24 PROCEDURE — 3045F PR MOST RECENT HEMOGLOBIN A1C LEVEL 7.0-9.0%: ICD-10-PCS | Mod: CPTII,S$GLB,, | Performed by: PODIATRIST

## 2019-09-24 PROCEDURE — 99999 PR PBB SHADOW E&M-EST. PATIENT-LVL II: CPT | Mod: PBBFAC,,, | Performed by: PODIATRIST

## 2019-09-24 PROCEDURE — 99213 OFFICE O/P EST LOW 20 MIN: CPT | Mod: S$GLB,,, | Performed by: PODIATRIST

## 2019-09-24 PROCEDURE — 99213 PR OFFICE/OUTPT VISIT, EST, LEVL III, 20-29 MIN: ICD-10-PCS | Mod: S$GLB,,, | Performed by: PODIATRIST

## 2019-09-24 PROCEDURE — 3008F BODY MASS INDEX DOCD: CPT | Mod: CPTII,S$GLB,, | Performed by: PODIATRIST

## 2019-09-24 PROCEDURE — 3045F PR MOST RECENT HEMOGLOBIN A1C LEVEL 7.0-9.0%: CPT | Mod: CPTII,S$GLB,, | Performed by: PODIATRIST

## 2019-09-24 PROCEDURE — 3008F PR BODY MASS INDEX (BMI) DOCUMENTED: ICD-10-PCS | Mod: CPTII,S$GLB,, | Performed by: PODIATRIST
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
## 2019-10-10 ENCOUNTER — PATIENT OUTREACH (OUTPATIENT)
Dept: ADMINISTRATIVE | Facility: OTHER | Age: 54
End: 2019-10-10

## 2019-10-11 ENCOUNTER — TELEPHONE (OUTPATIENT)
Dept: PODIATRY | Facility: CLINIC | Age: 54
End: 2019-10-11

## 2019-10-16 ENCOUNTER — PATIENT OUTREACH (OUTPATIENT)
Dept: ADMINISTRATIVE | Facility: OTHER | Age: 54
End: 2019-10-16

## 2019-10-21 ENCOUNTER — OFFICE VISIT (OUTPATIENT)
Dept: PODIATRY | Facility: CLINIC | Age: 54
End: 2019-10-21
Payer: COMMERCIAL

## 2019-10-21 VITALS — HEIGHT: 75 IN | WEIGHT: 263 LBS | BODY MASS INDEX: 32.7 KG/M2

## 2019-10-21 DIAGNOSIS — E11.49 TYPE II DIABETES MELLITUS WITH NEUROLOGICAL MANIFESTATIONS: Primary | ICD-10-CM

## 2019-10-21 DIAGNOSIS — L97.526 DIABETIC ULCER OF TOE OF LEFT FOOT ASSOCIATED WITH DIABETES MELLITUS DUE TO UNDERLYING CONDITION, WITH BONE INVOLVEMENT WITHOUT EVIDENCE OF NECROSIS: ICD-10-CM

## 2019-10-21 DIAGNOSIS — L97.412 DIABETIC ULCER OF RIGHT MIDFOOT ASSOCIATED WITH TYPE 2 DIABETES MELLITUS, WITH FAT LAYER EXPOSED: ICD-10-CM

## 2019-10-21 DIAGNOSIS — E11.621 DIABETIC ULCER OF RIGHT MIDFOOT ASSOCIATED WITH TYPE 2 DIABETES MELLITUS, WITH FAT LAYER EXPOSED: ICD-10-CM

## 2019-10-21 DIAGNOSIS — E08.621 DIABETIC ULCER OF TOE OF LEFT FOOT ASSOCIATED WITH DIABETES MELLITUS DUE TO UNDERLYING CONDITION, WITH BONE INVOLVEMENT WITHOUT EVIDENCE OF NECROSIS: ICD-10-CM

## 2019-10-21 PROCEDURE — 3008F BODY MASS INDEX DOCD: CPT | Mod: CPTII,S$GLB,, | Performed by: PODIATRIST

## 2019-10-21 PROCEDURE — 88307 TISSUE EXAM BY PATHOLOGIST: CPT | Mod: 26,,, | Performed by: PATHOLOGY

## 2019-10-21 PROCEDURE — 87075 CULTR BACTERIA EXCEPT BLOOD: CPT

## 2019-10-21 PROCEDURE — 87186 SC STD MICRODIL/AGAR DIL: CPT | Mod: 59

## 2019-10-21 PROCEDURE — 3008F PR BODY MASS INDEX (BMI) DOCUMENTED: ICD-10-PCS | Mod: CPTII,S$GLB,, | Performed by: PODIATRIST

## 2019-10-21 PROCEDURE — 88307 TISSUE SPECIMEN TO PATHOLOGY, PODIATRY: ICD-10-PCS | Mod: 26,,, | Performed by: PATHOLOGY

## 2019-10-21 PROCEDURE — 88311 TISSUE SPECIMEN TO PATHOLOGY, PODIATRY: ICD-10-PCS | Mod: 26,,, | Performed by: PATHOLOGY

## 2019-10-21 PROCEDURE — 87070 CULTURE OTHR SPECIMN AEROBIC: CPT

## 2019-10-21 PROCEDURE — 11042 DBRDMT SUBQ TIS 1ST 20SQCM/<: CPT | Mod: 59,S$GLB,, | Performed by: PODIATRIST

## 2019-10-21 PROCEDURE — 87076 CULTURE ANAEROBE IDENT EACH: CPT | Mod: 59

## 2019-10-21 PROCEDURE — 11044 WOUND DEBRIDEMENT: ICD-10-PCS | Mod: S$GLB,,, | Performed by: PODIATRIST

## 2019-10-21 PROCEDURE — 11044 DBRDMT BONE 1ST 20 SQ CM/<: CPT | Mod: S$GLB,,, | Performed by: PODIATRIST

## 2019-10-21 PROCEDURE — 87077 CULTURE AEROBIC IDENTIFY: CPT | Mod: 59

## 2019-10-21 PROCEDURE — 99214 OFFICE O/P EST MOD 30 MIN: CPT | Mod: 25,S$GLB,, | Performed by: PODIATRIST

## 2019-10-21 PROCEDURE — 99214 PR OFFICE/OUTPT VISIT, EST, LEVL IV, 30-39 MIN: ICD-10-PCS | Mod: 25,S$GLB,, | Performed by: PODIATRIST

## 2019-10-21 PROCEDURE — 99999 PR PBB SHADOW E&M-EST. PATIENT-LVL III: CPT | Mod: PBBFAC,,, | Performed by: PODIATRIST

## 2019-10-21 PROCEDURE — 88311 DECALCIFY TISSUE: CPT | Performed by: PATHOLOGY

## 2019-10-21 PROCEDURE — 88311 DECALCIFY TISSUE: CPT | Mod: 26,,, | Performed by: PATHOLOGY

## 2019-10-21 PROCEDURE — 99999 PR PBB SHADOW E&M-EST. PATIENT-LVL III: ICD-10-PCS | Mod: PBBFAC,,, | Performed by: PODIATRIST

## 2019-10-21 PROCEDURE — 11042 WOUND DEBRIDEMENT: ICD-10-PCS | Mod: 59,S$GLB,, | Performed by: PODIATRIST

## 2019-10-21 RX ORDER — CLINDAMYCIN HYDROCHLORIDE 300 MG/1
300 CAPSULE ORAL EVERY 8 HOURS
Qty: 30 CAPSULE | Refills: 0 | Status: SHIPPED | OUTPATIENT
Start: 2019-10-21 | End: 2019-10-31

## 2019-10-21 NOTE — LETTER
October 21, 2019      Catalino Mcfadden  [address] [place] [address]             [facility]  [address] [place] [address]  Phone: [phone] Patient: Catalino Mcfadden  MRN: [id number]  YOB: 1965  Date of Visit: 10/21/2019    To Whom It May Concern:    Catalino Rosa was seen in the [facility] on 10/21/2019.Patient has sustained limb threatening wound to his toes secondary to use of steel/composite toe boots.  He may return to work/school on 10/24/2019 with restrictions. He must be able to wear wound dressings and special shoes at all times. Recommend placement on light duty. He must be able to rest, ice, elevate the affected limb as needed. If you have any questions or concerns, or if I can be of further assistance, please do not hesitate to contact my office.    Sincerely,          Aviva Martinez DPM MHA  Podiatric Surgery & Wound Care  [facility]

## 2019-10-22 ENCOUNTER — PATIENT OUTREACH (OUTPATIENT)
Dept: ADMINISTRATIVE | Facility: OTHER | Age: 54
End: 2019-10-22

## 2019-10-22 ENCOUNTER — HOSPITAL ENCOUNTER (OUTPATIENT)
Dept: RADIOLOGY | Facility: HOSPITAL | Age: 54
Discharge: HOME | End: 2019-10-22
Attending: PODIATRIST
Payer: COMMERCIAL

## 2019-10-22 DIAGNOSIS — E11.49 TYPE II DIABETES MELLITUS WITH NEUROLOGICAL MANIFESTATIONS: ICD-10-CM

## 2019-10-22 DIAGNOSIS — E08.621 DIABETIC ULCER OF TOE OF LEFT FOOT ASSOCIATED WITH DIABETES MELLITUS DUE TO UNDERLYING CONDITION, WITH BONE INVOLVEMENT WITHOUT EVIDENCE OF NECROSIS: ICD-10-CM

## 2019-10-22 DIAGNOSIS — E11.621 DIABETIC ULCER OF RIGHT MIDFOOT ASSOCIATED WITH TYPE 2 DIABETES MELLITUS, WITH FAT LAYER EXPOSED: ICD-10-CM

## 2019-10-22 DIAGNOSIS — L97.412 DIABETIC ULCER OF RIGHT MIDFOOT ASSOCIATED WITH TYPE 2 DIABETES MELLITUS, WITH FAT LAYER EXPOSED: ICD-10-CM

## 2019-10-22 DIAGNOSIS — L97.526 DIABETIC ULCER OF TOE OF LEFT FOOT ASSOCIATED WITH DIABETES MELLITUS DUE TO UNDERLYING CONDITION, WITH BONE INVOLVEMENT WITHOUT EVIDENCE OF NECROSIS: ICD-10-CM

## 2019-10-22 PROCEDURE — 73630 XR FOOT COMPLETE 3 VIEW BILATERAL: ICD-10-PCS | Mod: 26,,, | Performed by: RADIOLOGY

## 2019-10-22 PROCEDURE — 73630 X-RAY EXAM OF FOOT: CPT | Mod: 50,TC,FY,PO

## 2019-10-22 PROCEDURE — 73630 X-RAY EXAM OF FOOT: CPT | Mod: 26,,, | Performed by: RADIOLOGY
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
## 2019-10-22 NOTE — PROGRESS NOTES
Subjective:      Patient ID: Catalino Mcfadden is a 54 y.o. male.    Chief Complaint:Osteomyelitis       History of Present Illness    Mr. Catalino Mcfadden is a 54 year old with HTN, DM, CKD III who is referred by Podiatry for new onset bilateral foot ulcers.      He reportedly developed bilateral wounds to 2nd toes after wearing new steel toed shoes.  Noted to have exposed bone and joint on the left 2nd dorsal toe by Podiatry.   A bone biopsy was sent for culture and pathology and is preliminarily showing a GNR and Staph Aureus (sensitivities pending).  He was referred to ID for evaluation and antibiotics.   Per Podiatry note,  Podiatry discussed options of amputation vs long term abx and patient wishes to proceed with antibiotics.     Labs:  CRP 43  ESR 65    Xray - shows soft tissue swelling to left 2nd toe with bony destruction to the 2nd PIP and distal phalanx  Last HA1C was 8/9    Micro history:  10/17/2018 - MSSA from left foot wound  8/3/2016 - K. Oxytoca and Pseudomonas from right foot wound     Denies any recent antibiotic use.  No antibiotic use prior to culture   Denies water exposure.      No fevers, chills, no sweats.  No significant pain.  Does have neuropathy.      Has hardware in right ankle (X 30 years)     Review of Systems   Constitution: Negative for chills, decreased appetite, diaphoresis, fever, malaise/fatigue, night sweats, weight gain and weight loss.   HENT: Negative for congestion, ear pain, hearing loss, hoarse voice, sore throat and tinnitus.    Eyes: Negative for blurred vision, redness and visual disturbance.   Cardiovascular: Positive for leg swelling. Negative for chest pain and palpitations.   Respiratory: Negative for cough, hemoptysis, shortness of breath, sputum production and wheezing.    Hematologic/Lymphatic: Negative for adenopathy. Does not bruise/bleed easily.   Skin: Positive for poor wound healing. Negative for dry skin, itching, rash and suspicious lesions.   Musculoskeletal:  Negative for back pain, joint pain, myalgias and neck pain.   Gastrointestinal: Negative for abdominal pain, constipation, diarrhea, heartburn, nausea and vomiting.   Genitourinary: Negative for dysuria, flank pain, frequency, hematuria, hesitancy and urgency.   Neurological: Negative for dizziness, headaches, numbness, paresthesias and weakness.   Psychiatric/Behavioral: Negative for depression and memory loss. The patient does not have insomnia and is not nervous/anxious.    Allergic/Immunologic: Negative for environmental allergies, hives and persistent infections.     Objective:   Physical Exam   Constitutional: He is oriented to person, place, and time. He appears well-developed and well-nourished. No distress.   HENT:   Head: Normocephalic and atraumatic.   Eyes: Conjunctivae are normal. No scleral icterus.   Neck: Normal range of motion. Neck supple.   Cardiovascular: Normal rate and regular rhythm. Exam reveals no friction rub.   No murmur heard.  Pulmonary/Chest: Effort normal and breath sounds normal.   Musculoskeletal: He exhibits edema ( bilateral ankle edema).   Bilateral feet in football dressings and Ariel boots.  No ascending streaking/erythema.    Neurological: He is alert and oriented to person, place, and time.   Skin: Skin is warm and dry. He is not diaphoretic.   Multiple tattoos   Vitals reviewed.    Assessment:       1. Osteomyelitis of second toe of left foot    2. Uncontrolled type 2 diabetes mellitus with stage 3 chronic kidney disease, without long-term current use of insulin    3. Essential hypertension         54 year old with HTN, poorly controlled DM, CKD III referred by Podiatry for new onset bilateral foot ulcers.  He developed bilateral wounds to 2nd toes after wearing new steel toed shoes.  Noted by Podiatry to have exposed bone and joint on the left 2nd dorsal toe.  A bone biopsy was sent for culture and pathology and is preliminarily showing a GNR and Staph Aureus (sensitivities  pending).  Inflammatory markers are elevated.  No recent cbc or cmp.  No signs of systemic infection.     Plan:     1.  Continue clindamycin and will add ciprofloxacin for now empirically given GNR and pending culture results. Dose to be determined pending cmp today.  Patient denies history of aortic aneurysm. No notation in medical record.   Discussed with patient side effects of quinolones (including but not limited to arrhythmia, hypoglycemia, achilles tendon rupture, c.diff).  Advised to monitor blood sugars closely.  Stop medication if develop tendon pain.  Avoid milk products, vitamins, antacids within 2 hours of taking medication.   2.  Ultimate antibiotic choice pending final cultures and sensitivities.   Will need PICC or other central access.  Need to determine GFR.  May need tunneled catheter vs midline.    3.  Cbc, cmp today.   4.  Probiotic daily  5.  Call or seek immediate medical attention for any fevers, chills, sweats, diarrhea, n/v or increase in pain, swelling, drainage from wound.  Patient given my contact information   6.  Follow up 14-21 days.    7.  Continue aggressive wound care per Podiatry  Discussed with ID staff

## 2019-10-23 ENCOUNTER — TELEPHONE (OUTPATIENT)
Dept: INFECTIOUS DISEASES | Facility: HOSPITAL | Age: 54
End: 2019-10-23

## 2019-10-23 ENCOUNTER — LAB VISIT (OUTPATIENT)
Dept: LAB | Facility: HOSPITAL | Age: 54
End: 2019-10-23
Payer: COMMERCIAL

## 2019-10-23 ENCOUNTER — OFFICE VISIT (OUTPATIENT)
Dept: INFECTIOUS DISEASES | Facility: CLINIC | Age: 54
End: 2019-10-23
Payer: COMMERCIAL

## 2019-10-23 VITALS
SYSTOLIC BLOOD PRESSURE: 155 MMHG | TEMPERATURE: 98 F | BODY MASS INDEX: 33.7 KG/M2 | DIASTOLIC BLOOD PRESSURE: 94 MMHG | WEIGHT: 262.56 LBS | HEIGHT: 74 IN | HEART RATE: 87 BPM

## 2019-10-23 DIAGNOSIS — M86.9 OSTEOMYELITIS OF SECOND TOE OF LEFT FOOT: ICD-10-CM

## 2019-10-23 DIAGNOSIS — M86.9 OSTEOMYELITIS OF SECOND TOE OF LEFT FOOT: Primary | ICD-10-CM

## 2019-10-23 DIAGNOSIS — I10 ESSENTIAL HYPERTENSION: ICD-10-CM

## 2019-10-23 LAB
ALBUMIN SERPL BCP-MCNC: 2.5 G/DL (ref 3.5–5.2)
ALP SERPL-CCNC: 362 U/L (ref 55–135)
ALT SERPL W/O P-5'-P-CCNC: 30 U/L (ref 10–44)
ANION GAP SERPL CALC-SCNC: 6 MMOL/L (ref 8–16)
AST SERPL-CCNC: 24 U/L (ref 10–40)
BASOPHILS # BLD AUTO: 0.02 K/UL (ref 0–0.2)
BASOPHILS NFR BLD: 0.3 % (ref 0–1.9)
BILIRUB SERPL-MCNC: 0.6 MG/DL (ref 0.1–1)
BUN SERPL-MCNC: 36 MG/DL (ref 6–20)
CALCIUM SERPL-MCNC: 9.2 MG/DL (ref 8.7–10.5)
CHLORIDE SERPL-SCNC: 98 MMOL/L (ref 95–110)
CO2 SERPL-SCNC: 28 MMOL/L (ref 23–29)
CREAT SERPL-MCNC: 2.9 MG/DL (ref 0.5–1.4)
DIFFERENTIAL METHOD: ABNORMAL
EOSINOPHIL # BLD AUTO: 0.1 K/UL (ref 0–0.5)
EOSINOPHIL NFR BLD: 1.8 % (ref 0–8)
ERYTHROCYTE [DISTWIDTH] IN BLOOD BY AUTOMATED COUNT: 13 % (ref 11.5–14.5)
EST. GFR  (AFRICAN AMERICAN): 26.9 ML/MIN/1.73 M^2
EST. GFR  (NON AFRICAN AMERICAN): 23.3 ML/MIN/1.73 M^2
GLUCOSE SERPL-MCNC: 214 MG/DL (ref 70–110)
HCT VFR BLD AUTO: 39.9 % (ref 40–54)
HGB BLD-MCNC: 12.4 G/DL (ref 14–18)
IMM GRANULOCYTES # BLD AUTO: 0.04 K/UL (ref 0–0.04)
IMM GRANULOCYTES NFR BLD AUTO: 0.6 % (ref 0–0.5)
LYMPHOCYTES # BLD AUTO: 1.1 K/UL (ref 1–4.8)
LYMPHOCYTES NFR BLD: 15.6 % (ref 18–48)
MCH RBC QN AUTO: 26.7 PG (ref 27–31)
MCHC RBC AUTO-ENTMCNC: 31.1 G/DL (ref 32–36)
MCV RBC AUTO: 86 FL (ref 82–98)
MONOCYTES # BLD AUTO: 0.7 K/UL (ref 0.3–1)
MONOCYTES NFR BLD: 9.4 % (ref 4–15)
NEUTROPHILS # BLD AUTO: 5.1 K/UL (ref 1.8–7.7)
NEUTROPHILS NFR BLD: 72.3 % (ref 38–73)
NRBC BLD-RTO: 0 /100 WBC
PLATELET # BLD AUTO: 318 K/UL (ref 150–350)
PMV BLD AUTO: 9.3 FL (ref 9.2–12.9)
POTASSIUM SERPL-SCNC: 4.4 MMOL/L (ref 3.5–5.1)
PROT SERPL-MCNC: 8 G/DL (ref 6–8.4)
RBC # BLD AUTO: 4.64 M/UL (ref 4.6–6.2)
SODIUM SERPL-SCNC: 132 MMOL/L (ref 136–145)
WBC # BLD AUTO: 7.05 K/UL (ref 3.9–12.7)

## 2019-10-23 PROCEDURE — 3008F BODY MASS INDEX DOCD: CPT | Mod: CPTII,S$GLB,, | Performed by: NURSE PRACTITIONER

## 2019-10-23 PROCEDURE — 36415 COLL VENOUS BLD VENIPUNCTURE: CPT

## 2019-10-23 PROCEDURE — 3077F PR MOST RECENT SYSTOLIC BLOOD PRESSURE >= 140 MM HG: ICD-10-PCS | Mod: CPTII,S$GLB,, | Performed by: NURSE PRACTITIONER

## 2019-10-23 PROCEDURE — 85025 COMPLETE CBC W/AUTO DIFF WBC: CPT

## 2019-10-23 PROCEDURE — 99999 PR PBB SHADOW E&M-EST. PATIENT-LVL IV: ICD-10-PCS | Mod: PBBFAC,,, | Performed by: NURSE PRACTITIONER

## 2019-10-23 PROCEDURE — 3077F SYST BP >= 140 MM HG: CPT | Mod: CPTII,S$GLB,, | Performed by: NURSE PRACTITIONER

## 2019-10-23 PROCEDURE — 3080F PR MOST RECENT DIASTOLIC BLOOD PRESSURE >= 90 MM HG: ICD-10-PCS | Mod: CPTII,S$GLB,, | Performed by: NURSE PRACTITIONER

## 2019-10-23 PROCEDURE — 99999 PR PBB SHADOW E&M-EST. PATIENT-LVL IV: CPT | Mod: PBBFAC,,, | Performed by: NURSE PRACTITIONER

## 2019-10-23 PROCEDURE — 3008F PR BODY MASS INDEX (BMI) DOCUMENTED: ICD-10-PCS | Mod: CPTII,S$GLB,, | Performed by: NURSE PRACTITIONER

## 2019-10-23 PROCEDURE — 99203 OFFICE O/P NEW LOW 30 MIN: CPT | Mod: S$GLB,,, | Performed by: NURSE PRACTITIONER

## 2019-10-23 PROCEDURE — 80053 COMPREHEN METABOLIC PANEL: CPT

## 2019-10-23 PROCEDURE — 99203 PR OFFICE/OUTPT VISIT, NEW, LEVL III, 30-44 MIN: ICD-10-PCS | Mod: S$GLB,,, | Performed by: NURSE PRACTITIONER

## 2019-10-23 PROCEDURE — 3080F DIAST BP >= 90 MM HG: CPT | Mod: CPTII,S$GLB,, | Performed by: NURSE PRACTITIONER

## 2019-10-23 RX ORDER — CIPROFLOXACIN 750 MG/1
750 TABLET, FILM COATED ORAL DAILY
Qty: 10 TABLET | Refills: 0 | Status: SHIPPED | OUTPATIENT
Start: 2019-10-23 | End: 2019-11-02

## 2019-10-23 NOTE — TELEPHONE ENCOUNTER
Labs resulted;  Creatinine is 2.9 - up from baseline 2.7 a month ago.    eGFR 27.  Will rx ciprofloxacin 750 mg once daily (pseudomonal dosing)  Sent message to his PCP re: renal function.   Will need to determine if he can have PICC vs midline vs tunneled cath.   Called patient to ensure hydrating adequately and to advise sent cipro to pharmacy.   Will adjust abx when cultures result.

## 2019-10-23 NOTE — LETTER
October 23, 2019      Aviva Martinez, DPM  [address]  [place] [address]           [facility] - Infectious Diseases  [address]  [place] [address]  Phone: [phone]  Fax: [phone]          Patient: Catalino Mcfadden   MR Number: [id number]   YOB: 1965   Date of Visit: 10/23/2019       Dear Dr. Aviva Martinez:    Thank you for referring Catalino Mcfadden to me for evaluation. Attached you will find relevant portions of my assessment and plan of care.    If you have questions, please do not hesitate to call me. I look forward to following Catalino Mcfadden along with you.    Sincerely,    DANUTA Salamanca, ANP    Enclosure  CC:  No Recipients    If you would like to receive this communication electronically, please contact [email or web address] or [phone] to request more information on [employer] access.    For providers and/or their staff who would like to refer a patient to [facility], please contact us through our one-stop-shop provider referral line, [facility], at [phone].    If you feel you have received this communication in error or would no longer like to receive these types of communications, please e-mail [email or web address]

## 2019-10-25 ENCOUNTER — TELEPHONE (OUTPATIENT)
Dept: INFECTIOUS DISEASES | Facility: HOSPITAL | Age: 54
End: 2019-10-25

## 2019-10-25 LAB
BACTERIA SPEC AEROBE CULT: ABNORMAL
BACTERIA SPEC AEROBE CULT: ABNORMAL

## 2019-10-25 NOTE — TELEPHONE ENCOUNTER
Cultures finalized with MSSA and Serratia.  Given patient kidney function, would need Nephrology clearance for PICC.   Will instead use midline and cefepime IV x 4 weeks and if doing well transition to orals.   Orders sent to [facility] to place midline, first dose and for antibiotics.   Attempted to call patient and his wife.  No answer, left messages

## 2019-10-28 ENCOUNTER — TELEPHONE (OUTPATIENT)
Dept: INFECTIOUS DISEASES | Facility: HOSPITAL | Age: 54
End: 2019-10-28

## 2019-10-28 ENCOUNTER — TELEPHONE (OUTPATIENT)
Dept: FAMILY MEDICINE | Facility: CLINIC | Age: 54
End: 2019-10-28

## 2019-10-28 ENCOUNTER — OFFICE VISIT (OUTPATIENT)
Dept: PODIATRY | Facility: CLINIC | Age: 54
End: 2019-10-28
Payer: COMMERCIAL

## 2019-10-28 VITALS
HEIGHT: 73 IN | DIASTOLIC BLOOD PRESSURE: 100 MMHG | SYSTOLIC BLOOD PRESSURE: 163 MMHG | BODY MASS INDEX: 34.72 KG/M2 | WEIGHT: 262 LBS

## 2019-10-28 DIAGNOSIS — M20.5X2 HALLUX LIMITUS, ACQUIRED, LEFT: ICD-10-CM

## 2019-10-28 DIAGNOSIS — M20.5X1 HALLUX LIMITUS, ACQUIRED, RIGHT: ICD-10-CM

## 2019-10-28 DIAGNOSIS — L90.9 PLANTAR FAT PAD ATROPHY: ICD-10-CM

## 2019-10-28 DIAGNOSIS — M20.42 HAMMER TOES OF BOTH FEET: ICD-10-CM

## 2019-10-28 DIAGNOSIS — L97.516 DIABETIC ULCER OF TOE OF RIGHT FOOT ASSOCIATED WITH TYPE 2 DIABETES MELLITUS, WITH BONE INVOLVEMENT WITHOUT EVIDENCE OF NECROSIS: ICD-10-CM

## 2019-10-28 DIAGNOSIS — M86.172 ACUTE OSTEOMYELITIS OF TOE, LEFT: ICD-10-CM

## 2019-10-28 DIAGNOSIS — E11.621 DIABETIC ULCER OF TOE OF RIGHT FOOT ASSOCIATED WITH TYPE 2 DIABETES MELLITUS, WITH BONE INVOLVEMENT WITHOUT EVIDENCE OF NECROSIS: ICD-10-CM

## 2019-10-28 DIAGNOSIS — L97.526 DIABETIC ULCER OF TOE OF LEFT FOOT ASSOCIATED WITH DIABETES MELLITUS DUE TO UNDERLYING CONDITION, WITH BONE INVOLVEMENT WITHOUT EVIDENCE OF NECROSIS: ICD-10-CM

## 2019-10-28 DIAGNOSIS — E11.49 TYPE II DIABETES MELLITUS WITH NEUROLOGICAL MANIFESTATIONS: Primary | ICD-10-CM

## 2019-10-28 DIAGNOSIS — M20.41 HAMMER TOES OF BOTH FEET: ICD-10-CM

## 2019-10-28 DIAGNOSIS — M21.6X1 PLANTARFLEXION DEFORMITY OF RIGHT FOOT: ICD-10-CM

## 2019-10-28 DIAGNOSIS — E08.621 DIABETIC ULCER OF TOE OF LEFT FOOT ASSOCIATED WITH DIABETES MELLITUS DUE TO UNDERLYING CONDITION, WITH BONE INVOLVEMENT WITHOUT EVIDENCE OF NECROSIS: ICD-10-CM

## 2019-10-28 LAB — BACTERIA SPEC ANAEROBE CULT: ABNORMAL

## 2019-10-28 PROCEDURE — 3077F PR MOST RECENT SYSTOLIC BLOOD PRESSURE >= 140 MM HG: ICD-10-PCS | Mod: CPTII,S$GLB,, | Performed by: PODIATRIST

## 2019-10-28 PROCEDURE — 3080F PR MOST RECENT DIASTOLIC BLOOD PRESSURE >= 90 MM HG: ICD-10-PCS | Mod: CPTII,S$GLB,, | Performed by: PODIATRIST

## 2019-10-28 PROCEDURE — 3008F PR BODY MASS INDEX (BMI) DOCUMENTED: ICD-10-PCS | Mod: CPTII,S$GLB,, | Performed by: PODIATRIST

## 2019-10-28 PROCEDURE — 3080F DIAST BP >= 90 MM HG: CPT | Mod: CPTII,S$GLB,, | Performed by: PODIATRIST

## 2019-10-28 PROCEDURE — 3008F BODY MASS INDEX DOCD: CPT | Mod: CPTII,S$GLB,, | Performed by: PODIATRIST

## 2019-10-28 PROCEDURE — 99214 OFFICE O/P EST MOD 30 MIN: CPT | Mod: S$GLB,,, | Performed by: PODIATRIST

## 2019-10-28 PROCEDURE — 99999 PR PBB SHADOW E&M-EST. PATIENT-LVL IV: ICD-10-PCS | Mod: PBBFAC,,, | Performed by: PODIATRIST

## 2019-10-28 PROCEDURE — 99214 PR OFFICE/OUTPT VISIT, EST, LEVL IV, 30-39 MIN: ICD-10-PCS | Mod: S$GLB,,, | Performed by: PODIATRIST

## 2019-10-28 PROCEDURE — 99999 PR PBB SHADOW E&M-EST. PATIENT-LVL IV: CPT | Mod: PBBFAC,,, | Performed by: PODIATRIST

## 2019-10-28 PROCEDURE — 3077F SYST BP >= 140 MM HG: CPT | Mod: CPTII,S$GLB,, | Performed by: PODIATRIST

## 2019-10-28 NOTE — TELEPHONE ENCOUNTER
Returned [employer] call.  There are some issues with insurance coverage.   Also spoke to Podiatry who put in referral to Nephrology for PICC approval. No appointment set yet.   Plan was for midline, but this may be an issue given insurance issues. or midline, but this may be an issue given insurance issues.     Spoke to patient.  Apparently this is a Worker's Compensation claim.  Advised to continue the clindamycin and ciprofloxacin for now and hopefully issue with insurance will be resolved tomorrow.  Also will work on getting in to see Nephrology over here - will see in sooner appointment.     Patient denies fevers, chills, pain other complaints.

## 2019-10-28 NOTE — LETTER
October 28, 2019      [facility]  [address]  [place] [address]  Phone: [phone]       Patient: Catalino Mcfadden   YOB: 1965  Date of Visit: 10/28/2019    To Whom It May Concern:    Alberto Mcfadden  was at [facility] on 10/28/2019. He may return to work/school on 10/28/2019 with restrictions. Pt is waiting on a call from wound care for up coming appt. If you have any questions or concerns, or if I can be of further assistance, please do not hesitate to contact me.    Sincerely,    RIGOBERTO Rivero
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
## 2019-10-28 NOTE — TELEPHONE ENCOUNTER
Returned [employer] call.  There are some issues with insurance coverage.   Also spoke to Podiatry who put in referral to Nephrology for PICC approval. No appointment set yet.  Plan was for midline, but this may be an issue given insurance issues.     Returned call from [employer].  Left message to return call   ----- Message from Lili Taylor sent at 10/28/2019  9:44 AM CDT -----  Contact: [employer] ([employer])  Staff Message     Caller name: [employer]     Reason for call: Returning a call to Teresa in reference to an update on the order for iv antibiotics.         Communication Preference: [phone]    Additional Information:

## 2019-10-29 ENCOUNTER — PATIENT OUTREACH (OUTPATIENT)
Dept: ADMINISTRATIVE | Facility: OTHER | Age: 54
End: 2019-10-29

## 2019-10-30 ENCOUNTER — TELEPHONE (OUTPATIENT)
Dept: INFECTIOUS DISEASES | Facility: HOSPITAL | Age: 54
End: 2019-10-30

## 2019-10-30 ENCOUNTER — OFFICE VISIT (OUTPATIENT)
Dept: NEPHROLOGY | Facility: CLINIC | Age: 54
End: 2019-10-30
Payer: COMMERCIAL

## 2019-10-30 VITALS
HEART RATE: 87 BPM | DIASTOLIC BLOOD PRESSURE: 100 MMHG | OXYGEN SATURATION: 98 % | WEIGHT: 255.94 LBS | SYSTOLIC BLOOD PRESSURE: 144 MMHG | HEIGHT: 73 IN | BODY MASS INDEX: 33.92 KG/M2

## 2019-10-30 DIAGNOSIS — N18.9 ANEMIA OF RENAL DISEASE: ICD-10-CM

## 2019-10-30 DIAGNOSIS — N18.4 CHRONIC KIDNEY DISEASE, STAGE IV (SEVERE): ICD-10-CM

## 2019-10-30 DIAGNOSIS — M86.9 OSTEOMYELITIS OF SECOND TOE OF LEFT FOOT: Primary | ICD-10-CM

## 2019-10-30 DIAGNOSIS — I10 ESSENTIAL HYPERTENSION: ICD-10-CM

## 2019-10-30 DIAGNOSIS — D63.1 ANEMIA OF RENAL DISEASE: ICD-10-CM

## 2019-10-30 PROCEDURE — 99999 PR PBB SHADOW E&M-EST. PATIENT-LVL IV: ICD-10-PCS | Mod: PBBFAC,,, | Performed by: INTERNAL MEDICINE

## 2019-10-30 PROCEDURE — 3008F BODY MASS INDEX DOCD: CPT | Mod: CPTII,S$GLB,, | Performed by: INTERNAL MEDICINE

## 2019-10-30 PROCEDURE — 99999 PR PBB SHADOW E&M-EST. PATIENT-LVL IV: CPT | Mod: PBBFAC,,, | Performed by: INTERNAL MEDICINE

## 2019-10-30 PROCEDURE — 3077F SYST BP >= 140 MM HG: CPT | Mod: CPTII,S$GLB,, | Performed by: INTERNAL MEDICINE

## 2019-10-30 PROCEDURE — 3080F PR MOST RECENT DIASTOLIC BLOOD PRESSURE >= 90 MM HG: ICD-10-PCS | Mod: CPTII,S$GLB,, | Performed by: INTERNAL MEDICINE

## 2019-10-30 PROCEDURE — 3008F PR BODY MASS INDEX (BMI) DOCUMENTED: ICD-10-PCS | Mod: CPTII,S$GLB,, | Performed by: INTERNAL MEDICINE

## 2019-10-30 PROCEDURE — 3080F DIAST BP >= 90 MM HG: CPT | Mod: CPTII,S$GLB,, | Performed by: INTERNAL MEDICINE

## 2019-10-30 PROCEDURE — 99204 OFFICE O/P NEW MOD 45 MIN: CPT | Mod: S$GLB,,, | Performed by: INTERNAL MEDICINE

## 2019-10-30 PROCEDURE — 3077F PR MOST RECENT SYSTOLIC BLOOD PRESSURE >= 140 MM HG: ICD-10-PCS | Mod: CPTII,S$GLB,, | Performed by: INTERNAL MEDICINE

## 2019-10-30 PROCEDURE — 99204 PR OFFICE/OUTPT VISIT, NEW, LEVL IV, 45-59 MIN: ICD-10-PCS | Mod: S$GLB,,, | Performed by: INTERNAL MEDICINE

## 2019-10-30 RX ORDER — LISINOPRIL 20 MG/1
40 TABLET ORAL DAILY
Qty: 90 TABLET | Refills: 11 | Status: SHIPPED | OUTPATIENT
Start: 2019-10-30 | End: 2020-05-18

## 2019-10-30 RX ORDER — DOXYCYCLINE 100 MG/1
100 CAPSULE ORAL EVERY 12 HOURS
Qty: 60 CAPSULE | Refills: 0 | Status: SHIPPED | OUTPATIENT
Start: 2019-10-30 | End: 2019-12-02 | Stop reason: SDUPTHER

## 2019-10-30 RX ORDER — DOXYCYCLINE HYCLATE 100 MG
100 TABLET ORAL EVERY 12 HOURS
Qty: 60 TABLET | Refills: 0 | Status: SHIPPED | OUTPATIENT
Start: 2019-10-30 | End: 2019-10-30 | Stop reason: CLARIF

## 2019-10-30 NOTE — LETTER
October 30, 2019      Aviva Martinez, DPM  [address]  [place] [address]           [facility]  [address]  [place] [address]  Phone: [phone]  Fax: [phone]          Patient: Catalino Mcfadden   MR Number: [id number]   YOB: 1965   Date of Visit: 10/30/2019       Dear Dr. Aviva Martinez:    Thank you for referring Catalino Mcfadden to me for evaluation. Attached you will find relevant portions of my assessment and plan of care.    If you have questions, please do not hesitate to call me. I look forward to following Catalino Mcfadden along with you.    Sincerely,    Nicci Ram MD    Enclosure  CC:  No Recipients    If you would like to receive this communication electronically, please contact [email or web address] or [phone] to request more information on [employer] access.    For providers and/or their staff who would like to refer a patient to [facility], please contact us through our one-stop-shop provider referral line, [facility], at [phone].    If you feel you have received this communication in error or would no longer like to receive these types of communications, please e-mail [email or web address]

## 2019-10-30 NOTE — TELEPHONE ENCOUNTER
Follow up phone call to patient.   Nephrology recommends Hill catheter or midline.   At this time, patient cannot afford the IV antibiotics (per [employer] would be 250.00 per week) regardless of access.   He is working with his Worker's Compensation insurer to get approval.    Continue oral doxycycline and ciprofloxacin pending insurance approval of IV.

## 2019-10-30 NOTE — TELEPHONE ENCOUNTER
"Called patient.  Change antibiotic from clindamycin to doxycycline.  MSSA is clinda resistant.   Notified patient and have sent Rx to Pharmacy at [facility]. He will  when he comes in for appointment today.   Continue ciprofloxacin for now as well pending arrangements for IV abx.   Patient has appointment today with Nephrology for evaluation and okay for PICC placement.   Patient "feels great" .  No fevers, chills, sweats.  Confirms appointment with Nephrololgy  "

## 2019-10-30 NOTE — PROGRESS NOTES
REASON FOR CONSULT: ckd    REFERRING PHYSICIAN: Azikiwe K Lombard, MD      HISTORY OF PRESENT ILLNESS: 54 y.o. male who is new to me  has a past medical history of Diabetes mellitus, type 2, Hyperlipidemia, and Hypertension. patient was referred here for abnormal renal function. Patient feels well today, no urinary or cardiac symptoms, minimal NSAIDs intake. His renal function has been abnormal for many years, with decline over the last 4-5 years, his DMII is uncontrolled and had it for 20+ years along with HTN, never seen by nephrologist before. He has foot infection with OM and been following with ID and Pod for Abx treatment planning to start long term IV Abx.        ROS:  General: negative for chills, or fatigue  ENT: No epistaxis or headaches  Hematological and Lymphatic: No bleeding problems or blood clots.  Endocrine: No skin changes or temperature intolerance  Respiratory: No cough, shortness of breath, or wheezing  Cardiovascular: No chest pain or dyspnea   Gastrointestinal: No abdominal pain, change in bowel habits  Genito-Urinary: No dysuria, trouble voiding, or hematuria  Musculoskeletal: ROS: negative for - joint pain, joint stiffness, joint swelling, muscle pain or muscular weakness  Neurological: No focal weakness, no numbness  Dermatological: No rash or ulcers.    PAST MEDICAL HISTORY:  Past Medical History:   Diagnosis Date    Diabetes mellitus, type 2     Hyperlipidemia     Hypertension        PAST SURGICAL HISTORY:  Past Surgical History:   Procedure Laterality Date    CERVICAL DISC SURGERY  07/11/2016       FAMILY HISTORY:   No family history on file.    SOCIAL HISTORY:  Social History     Socioeconomic History    Marital status:      Spouse name: Not on file    Number of children: Not on file    Years of education: Not on file    Highest education level: Not on file   Occupational History    Not on file   Social Needs    Financial resource strain: Not on file    Food  insecurity:     Worry: Not on file     Inability: Not on file    Transportation needs:     Medical: Not on file     Non-medical: Not on file   Tobacco Use    Smoking status: Never Smoker    Smokeless tobacco: Never Used   Substance and Sexual Activity    Alcohol use: Yes     Alcohol/week: 0.0 standard drinks     Comment: social    Drug use: No    Sexual activity: Not on file   Lifestyle    Physical activity:     Days per week: Not on file     Minutes per session: Not on file    Stress: Not on file   Relationships    Social connections:     Talks on phone: Not on file     Gets together: Not on file     Attends Jainism service: Not on file     Active member of club or organization: Not on file     Attends meetings of clubs or organizations: Not on file     Relationship status: Not on file   Other Topics Concern    Not on file   Social History Narrative    Not on file       ALLERGIES:  Review of patient's allergies indicates:   Allergen Reactions    Morphine Hallucinations       MEDICATIONS:    Current Outpatient Medications:     blood sugar diagnostic Strp, 1 strip by Misc.(Non-Drug; Combo Route) route 3 (three) times daily. Patient needs One Touch Test Strips (Berio)., Disp: 100 strip, Rfl: 2    chlorthalidone (HYGROTEN) 25 MG Tab, Take 1 tablet (25 mg total) by mouth once daily., Disp: 90 tablet, Rfl: 1    ciprofloxacin HCl (CIPRO) 750 MG tablet, Take 1 tablet (750 mg total) by mouth once daily. for 10 days, Disp: 10 tablet, Rfl: 0    clindamycin (CLEOCIN) 300 MG capsule, Take 1 capsule (300 mg total) by mouth every 8 (eight) hours. for 10 days, Disp: 30 capsule, Rfl: 0    diltiaZEM (CARTIA XT) 240 MG 24 hr capsule, TAKE 1 CAPSULE(240 MG) BY MOUTH EVERY DAY, Disp: 90 capsule, Rfl: 1    dulaglutide (TRULICITY) 0.75 mg/0.5 mL PnIj, INJECT 0.5 ML UNDER THE SKIN EVERY 7 DAYS, Disp: 2 mL, Rfl: 5    ibuprofen (ADVIL,MOTRIN) 800 MG tablet, Take 400 mg by mouth as needed. , Disp: , Rfl:     lisinopril  (PRINIVIL,ZESTRIL) 20 MG tablet, Take 2 tablets (40 mg total) by mouth once daily., Disp: 90 tablet, Rfl: 11    atorvastatin (LIPITOR) 40 MG tablet, Take 1 tablet (40 mg total) by mouth once daily. (Patient not taking: Reported on 10/30/2019), Disp: 90 tablet, Rfl: 3    doxycycline (VIBRAMYCIN) 100 MG Cap, Take 1 capsule (100 mg total) by mouth every 12 (twelve) hours., Disp: 60 capsule, Rfl: 0    hydrocodone-acetaminophen 7.5-325mg (NORCO) 7.5-325 mg per tablet, Take 1 tablet by mouth as needed. , Disp: , Rfl:    Medication List with Changes/Refills   Current Medications    ATORVASTATIN (LIPITOR) 40 MG TABLET    Take 1 tablet (40 mg total) by mouth once daily.    BLOOD SUGAR DIAGNOSTIC STRP    1 strip by Misc.(Non-Drug; Combo Route) route 3 (three) times daily. Patient needs One Touch Test Strips (Berio).    CHLORTHALIDONE (HYGROTEN) 25 MG TAB    Take 1 tablet (25 mg total) by mouth once daily.    CIPROFLOXACIN HCL (CIPRO) 750 MG TABLET    Take 1 tablet (750 mg total) by mouth once daily. for 10 days    CLINDAMYCIN (CLEOCIN) 300 MG CAPSULE    Take 1 capsule (300 mg total) by mouth every 8 (eight) hours. for 10 days    DILTIAZEM (CARTIA XT) 240 MG 24 HR CAPSULE    TAKE 1 CAPSULE(240 MG) BY MOUTH EVERY DAY    DOXYCYCLINE (VIBRAMYCIN) 100 MG CAP    Take 1 capsule (100 mg total) by mouth every 12 (twelve) hours.    DULAGLUTIDE (TRULICITY) 0.75 MG/0.5 ML PNIJ    INJECT 0.5 ML UNDER THE SKIN EVERY 7 DAYS    HYDROCODONE-ACETAMINOPHEN 7.5-325MG (NORCO) 7.5-325 MG PER TABLET    Take 1 tablet by mouth as needed.     IBUPROFEN (ADVIL,MOTRIN) 800 MG TABLET    Take 400 mg by mouth as needed.    Changed and/or Refilled Medications    Modified Medication Previous Medication    LISINOPRIL (PRINIVIL,ZESTRIL) 20 MG TABLET lisinopril (PRINIVIL,ZESTRIL) 20 MG tablet       Take 2 tablets (40 mg total) by mouth once daily.    Take 1 tablet (20 mg total) by mouth once daily.        PHYSICAL EXAM:  BP (!) 144/100   Pulse 87   Ht 6'  "1" (1.854 m)   Wt 116.1 kg (255 lb 15.3 oz)   SpO2 98%   BMI 33.77 kg/m²     General: No distress, No fever or chills  Head: Normocephalic,atraumatic  Eyes: conjunctivae/corneas clear. PERRL, EOM's intact.  Nose: Nares normal. Mucosa normal. No drainage or sinus tenderness.  Neck: No adenopathy,no carotid bruit,no JVD  Lungs:Clear to auscultation bilaterally, No Crackles  Heart: Regular rate and rhythm, no murmur, gallops or rubs  Abdomen: Soft, no tenderness, bowel sounds normal  Extremities: Atraumatic, 1+ edema in LE  Skin: Turgor normal. No rashes   Neurologic: No focal weakness, oriented.  Dialysis Access: Non applicable         LABS:  Lab Results   Component Value Date    CREATININE 2.9 (H) 10/23/2019       No results found for: UTPCR    Lab Results   Component Value Date     (L) 10/23/2019    K 4.4 10/23/2019    CO2 28 10/23/2019       last PTH   Lab Results   Component Value Date    CALCIUM 9.2 10/23/2019       Lab Results   Component Value Date    HGB 12.4 (L) 10/23/2019        Lab Results   Component Value Date    HGBA1C 8.9 (H) 08/28/2019       Lab Results   Component Value Date    LDLCALC 109.6 08/28/2019       ASSESSMENT:    CKD IV  -likely from DMII (for 20+ yrs uncontrolled with retinopathy and PVD) and HTN  -Cr baseline ~ 3.0 with eGFR ~ 25 ml/min, declining 3-5 ml/min/yr  -UPCR 3+ but not recently  -Renal US na    HTN  Uncontrolled on Diltiazem, Lisinopril 20 and Chlorthalidone 25    Anemia  -from ckd  -Hgb goal ~ 10  -Iron stores not available    Secondary Hyperparathyroidism  -Phos/Ca acceptable  -PTH na      Acid/Base  -No Metabolic acidosis                PLAN:  -If IV Abx needed for his OM and can be only administer as out/pt, more proximal central line is preferred to avoid extremities venous fibrosis such as Hill cathter, if this is not possible then can proceed with midline. Patient understands that he'll be on dialysis in the near future and these procedures can compromise his " HD access.  -Continue current BP meds regimen with increase of Lisinopril to 40 mg/d, repeat BMP in 2 weeks  -Avoid NSAIDs intake      RTC in 4 months with labs      Thanks for allowing me to participate in the care of this patient.     10:32 AM    HERBERT CALDERON MD  NEPHROLOGY ATTENDING

## 2019-11-04 ENCOUNTER — PATIENT MESSAGE (OUTPATIENT)
Dept: INFECTIOUS DISEASES | Facility: CLINIC | Age: 54
End: 2019-11-04

## 2019-11-04 ENCOUNTER — TELEPHONE (OUTPATIENT)
Dept: INFECTIOUS DISEASES | Facility: CLINIC | Age: 54
End: 2019-11-04

## 2019-11-04 DIAGNOSIS — M86.9 OSTEOMYELITIS OF SECOND TOE OF LEFT FOOT: Primary | ICD-10-CM

## 2019-11-04 RX ORDER — CIPROFLOXACIN 750 MG/1
750 TABLET, FILM COATED ORAL DAILY
Qty: 30 TABLET | Refills: 0 | Status: SHIPPED | OUTPATIENT
Start: 2019-11-04 | End: 2019-12-02 | Stop reason: SDUPTHER

## 2019-11-04 NOTE — TELEPHONE ENCOUNTER
Called patient and he stated that he feels ok. He doesn't see the wound until Friday but no new symptoms and feels fine. Informed patient of abx being refilled. Patient understand and will .

## 2019-11-06 ENCOUNTER — TELEPHONE (OUTPATIENT)
Dept: INFECTIOUS DISEASES | Facility: CLINIC | Age: 54
End: 2019-11-06

## 2019-11-06 NOTE — TELEPHONE ENCOUNTER
Called Roopa from [employer] back and informed we are waiting for approval from workers comp in order to start abx. She understood.

## 2019-11-06 NOTE — TELEPHONE ENCOUNTER
----- Message from DANUTA Salamanca, ANP sent at 11/6/2019  1:14 PM CST -----  Contact: [employer]  Please call her.  Still awaiting worker's comp approval.    Will you please call the patient and ask where we stand with the worker's comp claim. Thanks.   ----- Message -----  From: Denise Woods MA  Sent: 11/6/2019  12:41 PM CST  To: DANUTA Salamanca, ANP     Did we decide on yes or no for the iv abx.   ----- Message -----  From: Ashley Segura  Sent: 11/6/2019  12:33 PM CST  To: Gretta Moore Staff    [employer] [facility]    Roopa    Ph   [phone]     Has a plan been figured out ??  Please call  Thanks

## 2019-11-06 NOTE — TELEPHONE ENCOUNTER
Called Eugenia back and she stated that patient told her we wanted her to call us. Informed her patient informed us that she wanted us to call her. Informed her just make sure everything goes to medical records to be processed and we will sign as soon as they process. She understood.

## 2019-11-06 NOTE — TELEPHONE ENCOUNTER
Attempted to call Lizet again (Worker's comp nurse) at number provided.   No answer and unable to leave voicemail

## 2019-11-06 NOTE — TELEPHONE ENCOUNTER
Called Lizet (nurse with workers comp) with no answer. Unable to leave voicemail because message stating call cannot be completed at this time.

## 2019-11-06 NOTE — TELEPHONE ENCOUNTER
----- Message from Jim Louis sent at 11/6/2019  2:53 PM CST -----  Contact: Eugenia hernandez/ Kim @ [phone]  Lizet is asking to speak w/ the staff regarding pt's workers comp, per the pt's request

## 2019-11-06 NOTE — TELEPHONE ENCOUNTER
Called patient and asked what was his status with his workers comp claim. He stated he spoke with his nurse with workers comp and stated she is trying to get right forms to send. He gave the number for his nurse arin at [phone] and informed me to call her and we may be able to assist her in what she needs to do.

## 2019-11-07 ENCOUNTER — TELEPHONE (OUTPATIENT)
Dept: INFECTIOUS DISEASES | Facility: HOSPITAL | Age: 54
End: 2019-11-07

## 2019-11-08 ENCOUNTER — LAB VISIT (OUTPATIENT)
Dept: LAB | Facility: HOSPITAL | Age: 54
End: 2019-11-08
Attending: NURSE PRACTITIONER
Payer: COMMERCIAL

## 2019-11-08 ENCOUNTER — TELEPHONE (OUTPATIENT)
Dept: INFECTIOUS DISEASES | Facility: HOSPITAL | Age: 54
End: 2019-11-08

## 2019-11-08 ENCOUNTER — HOSPITAL ENCOUNTER (OUTPATIENT)
Dept: WOUND CARE | Facility: HOSPITAL | Age: 54
Discharge: HOME | End: 2019-11-08
Attending: PODIATRIST
Payer: COMMERCIAL

## 2019-11-08 DIAGNOSIS — E11.621 TYPE 2 DIABETES MELLITUS WITH FOOT ULCER, WITHOUT LONG-TERM CURRENT USE OF INSULIN: ICD-10-CM

## 2019-11-08 DIAGNOSIS — M86.9 OSTEOMYELITIS OF SECOND TOE OF LEFT FOOT: Primary | ICD-10-CM

## 2019-11-08 DIAGNOSIS — R79.89 ELEVATED SERUM CREATININE: Primary | ICD-10-CM

## 2019-11-08 DIAGNOSIS — M86.9 OSTEOMYELITIS OF SECOND TOE OF LEFT FOOT: ICD-10-CM

## 2019-11-08 DIAGNOSIS — L97.509 TYPE 2 DIABETES MELLITUS WITH FOOT ULCER, WITHOUT LONG-TERM CURRENT USE OF INSULIN: ICD-10-CM

## 2019-11-08 LAB
ALBUMIN SERPL BCP-MCNC: 3.2 G/DL (ref 3.5–5.2)
ALP SERPL-CCNC: 345 U/L (ref 55–135)
ALT SERPL W/O P-5'-P-CCNC: 51 U/L (ref 10–44)
ANION GAP SERPL CALC-SCNC: 6 MMOL/L (ref 8–16)
AST SERPL-CCNC: 50 U/L (ref 10–40)
BASOPHILS # BLD AUTO: 0.06 K/UL (ref 0–0.2)
BASOPHILS NFR BLD: 1.3 % (ref 0–1.9)
BILIRUB SERPL-MCNC: 0.5 MG/DL (ref 0.1–1)
BUN SERPL-MCNC: 52 MG/DL (ref 6–20)
CALCIUM SERPL-MCNC: 9.5 MG/DL (ref 8.7–10.5)
CHLORIDE SERPL-SCNC: 103 MMOL/L (ref 95–110)
CO2 SERPL-SCNC: 24 MMOL/L (ref 23–29)
CREAT SERPL-MCNC: 3.3 MG/DL (ref 0.5–1.4)
CRP SERPL-MCNC: 5.1 MG/L (ref 0–8.2)
DIFFERENTIAL METHOD: ABNORMAL
EOSINOPHIL # BLD AUTO: 0.2 K/UL (ref 0–0.5)
EOSINOPHIL NFR BLD: 3.3 % (ref 0–8)
ERYTHROCYTE [DISTWIDTH] IN BLOOD BY AUTOMATED COUNT: 12.8 % (ref 11.5–14.5)
ERYTHROCYTE [SEDIMENTATION RATE] IN BLOOD BY WESTERGREN METHOD: 55 MM/HR (ref 0–10)
EST. GFR  (AFRICAN AMERICAN): 23 ML/MIN/1.73 M^2
EST. GFR  (NON AFRICAN AMERICAN): 20 ML/MIN/1.73 M^2
GLUCOSE SERPL-MCNC: 165 MG/DL (ref 70–110)
HCT VFR BLD AUTO: 43 % (ref 40–54)
HGB BLD-MCNC: 13.6 G/DL (ref 14–18)
IMM GRANULOCYTES # BLD AUTO: 0.03 K/UL (ref 0–0.04)
IMM GRANULOCYTES NFR BLD AUTO: 0.7 % (ref 0–0.5)
LYMPHOCYTES # BLD AUTO: 1 K/UL (ref 1–4.8)
LYMPHOCYTES NFR BLD: 21.3 % (ref 18–48)
MCH RBC QN AUTO: 26.4 PG (ref 27–31)
MCHC RBC AUTO-ENTMCNC: 31.6 G/DL (ref 32–36)
MCV RBC AUTO: 84 FL (ref 82–98)
MONOCYTES # BLD AUTO: 0.5 K/UL (ref 0.3–1)
MONOCYTES NFR BLD: 10.8 % (ref 4–15)
NEUTROPHILS # BLD AUTO: 2.9 K/UL (ref 1.8–7.7)
NEUTROPHILS NFR BLD: 62.6 % (ref 38–73)
NRBC BLD-RTO: 0 /100 WBC
PLATELET # BLD AUTO: 297 K/UL (ref 150–350)
PMV BLD AUTO: 9.1 FL (ref 9.2–12.9)
POTASSIUM SERPL-SCNC: 5 MMOL/L (ref 3.5–5.1)
PROT SERPL-MCNC: 8 G/DL (ref 6–8.4)
RBC # BLD AUTO: 5.15 M/UL (ref 4.6–6.2)
SODIUM SERPL-SCNC: 133 MMOL/L (ref 136–145)
WBC # BLD AUTO: 4.61 K/UL (ref 3.9–12.7)

## 2019-11-08 PROCEDURE — 99213 OFFICE O/P EST LOW 20 MIN: CPT | Performed by: INTERNAL MEDICINE

## 2019-11-08 PROCEDURE — 99214 PR OFFICE/OUTPT VISIT, EST, LEVL IV, 30-39 MIN: ICD-10-PCS | Mod: 25,,, | Performed by: INTERNAL MEDICINE

## 2019-11-08 PROCEDURE — 11042 PR DEBRIDEMENT, SKIN, SUB-Q TISSUE,=<20 SQ CM: ICD-10-PCS | Mod: ,,, | Performed by: INTERNAL MEDICINE

## 2019-11-08 PROCEDURE — 36415 COLL VENOUS BLD VENIPUNCTURE: CPT

## 2019-11-08 PROCEDURE — 85025 COMPLETE CBC W/AUTO DIFF WBC: CPT

## 2019-11-08 PROCEDURE — 27201912 HC WOUND CARE DEBRIDEMENT SUPPLIES: Performed by: INTERNAL MEDICINE

## 2019-11-08 PROCEDURE — 86140 C-REACTIVE PROTEIN: CPT

## 2019-11-08 PROCEDURE — 99214 OFFICE O/P EST MOD 30 MIN: CPT | Mod: 25,,, | Performed by: INTERNAL MEDICINE

## 2019-11-08 PROCEDURE — 85652 RBC SED RATE AUTOMATED: CPT

## 2019-11-08 PROCEDURE — 25000003 PHARM REV CODE 250

## 2019-11-08 PROCEDURE — 80053 COMPREHEN METABOLIC PANEL: CPT

## 2019-11-08 PROCEDURE — 11042 DBRDMT SUBQ TIS 1ST 20SQCM/<: CPT | Mod: ,,, | Performed by: INTERNAL MEDICINE

## 2019-11-08 PROCEDURE — 11042 DBRDMT SUBQ TIS 1ST 20SQCM/<: CPT | Performed by: INTERNAL MEDICINE

## 2019-11-08 NOTE — TELEPHONE ENCOUNTER
Spoke with Lizet at [employer].   Worker's Comp paperwork sent to medical records, not to us.   Requested forms sent to us. She requests clinic notes.   Awaiting necessary paperwork.

## 2019-11-08 NOTE — TELEPHONE ENCOUNTER
Called patient to follow up.  He has appointment with Wound Care tomorrow.   He reports he is taking the ciprofloxacin and doxycycline as directed.   States he feels great.  No fevers, chills.    Foot has been wrapped since last Monday.  No strikethrough per patient.  No ascending redness.   He will go for labs tomorrow.   The Worker's Comp  is going to his wound care appt.   I have messaged Dr. Hopkins to communicate with me re the status of the wound.

## 2019-11-08 NOTE — TELEPHONE ENCOUNTER
Receive message from Dr. Hopkins regarding status of wound - per Dr. Hopkins looks significantly better.    Labs were done today  CRP has normalized - 5.1 (down from 42)  No leukocytosis.   Creatinine increased from 2.9 to 3.3  Called patient.  He is making urine.  No decrease in urine output. Advised to ensure he is hydrating.   Will repeat renal function panel on Monday with Nobles's stain (evaluate for possible antibiotic related AIN) and U/A  Notified his Nephrologist of labs and will forward Monday's labs to him.

## 2019-11-11 ENCOUNTER — LAB VISIT (OUTPATIENT)
Dept: LAB | Facility: HOSPITAL | Age: 54
End: 2019-11-11
Attending: NURSE PRACTITIONER
Payer: COMMERCIAL

## 2019-11-11 DIAGNOSIS — R79.89 ELEVATED SERUM CREATININE: ICD-10-CM

## 2019-11-11 DIAGNOSIS — R79.89 ELEVATED SERUM CREATININE: Primary | ICD-10-CM

## 2019-11-11 LAB
ALBUMIN SERPL BCP-MCNC: 2.9 G/DL (ref 3.5–5.2)
ANION GAP SERPL CALC-SCNC: 8 MMOL/L (ref 8–16)
BUN SERPL-MCNC: 54 MG/DL (ref 6–20)
CALCIUM SERPL-MCNC: 9.4 MG/DL (ref 8.7–10.5)
CHLORIDE SERPL-SCNC: 102 MMOL/L (ref 95–110)
CO2 SERPL-SCNC: 24 MMOL/L (ref 23–29)
CREAT SERPL-MCNC: 2.8 MG/DL (ref 0.5–1.4)
EST. GFR  (AFRICAN AMERICAN): 28.1 ML/MIN/1.73 M^2
EST. GFR  (NON AFRICAN AMERICAN): 24.3 ML/MIN/1.73 M^2
GLUCOSE SERPL-MCNC: 168 MG/DL (ref 70–110)
PHOSPHATE SERPL-MCNC: 4 MG/DL (ref 2.7–4.5)
POTASSIUM SERPL-SCNC: 4.2 MMOL/L (ref 3.5–5.1)
SODIUM SERPL-SCNC: 134 MMOL/L (ref 136–145)

## 2019-11-11 PROCEDURE — 80069 RENAL FUNCTION PANEL: CPT

## 2019-11-11 PROCEDURE — 36415 COLL VENOUS BLD VENIPUNCTURE: CPT | Mod: PO

## 2019-11-12 ENCOUNTER — TELEPHONE (OUTPATIENT)
Dept: INFECTIOUS DISEASES | Facility: HOSPITAL | Age: 54
End: 2019-11-12

## 2019-11-12 DIAGNOSIS — M86.9 OSTEOMYELITIS OF SECOND TOE OF LEFT FOOT: Primary | ICD-10-CM

## 2019-11-13 NOTE — TELEPHONE ENCOUNTER
Called patient with lab results.   Creatinine down trending 3.3>>2.8  Nobles's stain - negative.   Still no approval for IV  Continue cipro/doxy.  Has shown some improvement with this regimen.    Sees Podiatry Friday and will get weekly labs at that time.   Feels great. No fevers, chills.

## 2019-11-15 ENCOUNTER — TELEPHONE (OUTPATIENT)
Dept: RADIOLOGY | Facility: HOSPITAL | Age: 54
End: 2019-11-15

## 2019-11-15 ENCOUNTER — LAB VISIT (OUTPATIENT)
Dept: LAB | Facility: HOSPITAL | Age: 54
End: 2019-11-15
Attending: NURSE PRACTITIONER
Payer: COMMERCIAL

## 2019-11-15 ENCOUNTER — DOCUMENTATION ONLY (OUTPATIENT)
Dept: INFECTIOUS DISEASES | Facility: CLINIC | Age: 54
End: 2019-11-15

## 2019-11-15 ENCOUNTER — HOSPITAL ENCOUNTER (OUTPATIENT)
Dept: WOUND CARE | Facility: HOSPITAL | Age: 54
Discharge: HOME | End: 2019-11-15
Attending: PODIATRIST
Payer: COMMERCIAL

## 2019-11-15 ENCOUNTER — TELEPHONE (OUTPATIENT)
Dept: INFECTIOUS DISEASES | Facility: CLINIC | Age: 54
End: 2019-11-15

## 2019-11-15 DIAGNOSIS — Z79.2 LONG TERM (CURRENT) USE OF ANTIBIOTICS: ICD-10-CM

## 2019-11-15 DIAGNOSIS — M86.9 OSTEOMYELITIS OF SECOND TOE OF LEFT FOOT: ICD-10-CM

## 2019-11-15 DIAGNOSIS — E11.621 TYPE 2 DIABETES MELLITUS WITH FOOT ULCER, WITHOUT LONG-TERM CURRENT USE OF INSULIN: Primary | ICD-10-CM

## 2019-11-15 DIAGNOSIS — L97.509 TYPE 2 DIABETES MELLITUS WITH FOOT ULCER, WITHOUT LONG-TERM CURRENT USE OF INSULIN: Primary | ICD-10-CM

## 2019-11-15 DIAGNOSIS — M86.9 OSTEOMYELITIS OF SECOND TOE OF LEFT FOOT: Primary | ICD-10-CM

## 2019-11-15 LAB
ALBUMIN SERPL BCP-MCNC: 2.9 G/DL (ref 3.5–5.2)
ALP SERPL-CCNC: 295 U/L (ref 55–135)
ALT SERPL W/O P-5'-P-CCNC: 63 U/L (ref 10–44)
ANION GAP SERPL CALC-SCNC: 4 MMOL/L (ref 8–16)
AST SERPL-CCNC: 65 U/L (ref 10–40)
BASOPHILS # BLD AUTO: 0.04 K/UL (ref 0–0.2)
BASOPHILS NFR BLD: 0.9 % (ref 0–1.9)
BILIRUB SERPL-MCNC: 0.3 MG/DL (ref 0.1–1)
BUN SERPL-MCNC: 48 MG/DL (ref 6–20)
CALCIUM SERPL-MCNC: 9.3 MG/DL (ref 8.7–10.5)
CHLORIDE SERPL-SCNC: 102 MMOL/L (ref 95–110)
CO2 SERPL-SCNC: 27 MMOL/L (ref 23–29)
CREAT SERPL-MCNC: 2.6 MG/DL (ref 0.5–1.4)
CRP SERPL-MCNC: 3.8 MG/L (ref 0–8.2)
DIFFERENTIAL METHOD: ABNORMAL
EOSINOPHIL # BLD AUTO: 0.1 K/UL (ref 0–0.5)
EOSINOPHIL NFR BLD: 3 % (ref 0–8)
ERYTHROCYTE [DISTWIDTH] IN BLOOD BY AUTOMATED COUNT: 12.9 % (ref 11.5–14.5)
ERYTHROCYTE [SEDIMENTATION RATE] IN BLOOD BY WESTERGREN METHOD: 40 MM/HR (ref 0–10)
EST. GFR  (AFRICAN AMERICAN): 31 ML/MIN/1.73 M^2
EST. GFR  (NON AFRICAN AMERICAN): 27 ML/MIN/1.73 M^2
GLUCOSE SERPL-MCNC: 198 MG/DL (ref 70–110)
HCT VFR BLD AUTO: 41.3 % (ref 40–54)
HGB BLD-MCNC: 13.3 G/DL (ref 14–18)
IMM GRANULOCYTES # BLD AUTO: 0.02 K/UL (ref 0–0.04)
IMM GRANULOCYTES NFR BLD AUTO: 0.5 % (ref 0–0.5)
LYMPHOCYTES # BLD AUTO: 0.9 K/UL (ref 1–4.8)
LYMPHOCYTES NFR BLD: 21.5 % (ref 18–48)
MCH RBC QN AUTO: 27.1 PG (ref 27–31)
MCHC RBC AUTO-ENTMCNC: 32.2 G/DL (ref 32–36)
MCV RBC AUTO: 84 FL (ref 82–98)
MONOCYTES # BLD AUTO: 0.5 K/UL (ref 0.3–1)
MONOCYTES NFR BLD: 10.3 % (ref 4–15)
NEUTROPHILS # BLD AUTO: 2.8 K/UL (ref 1.8–7.7)
NEUTROPHILS NFR BLD: 63.8 % (ref 38–73)
NRBC BLD-RTO: 0 /100 WBC
PLATELET # BLD AUTO: 204 K/UL (ref 150–350)
PMV BLD AUTO: 9.2 FL (ref 9.2–12.9)
POTASSIUM SERPL-SCNC: 4.8 MMOL/L (ref 3.5–5.1)
PROT SERPL-MCNC: 7.4 G/DL (ref 6–8.4)
RBC # BLD AUTO: 4.9 M/UL (ref 4.6–6.2)
SODIUM SERPL-SCNC: 133 MMOL/L (ref 136–145)
WBC # BLD AUTO: 4.37 K/UL (ref 3.9–12.7)

## 2019-11-15 PROCEDURE — 86140 C-REACTIVE PROTEIN: CPT

## 2019-11-15 PROCEDURE — 36415 COLL VENOUS BLD VENIPUNCTURE: CPT

## 2019-11-15 PROCEDURE — 85025 COMPLETE CBC W/AUTO DIFF WBC: CPT

## 2019-11-15 PROCEDURE — 85652 RBC SED RATE AUTOMATED: CPT

## 2019-11-15 PROCEDURE — 97597 DBRDMT OPN WND 1ST 20 CM/<: CPT | Performed by: PODIATRIST

## 2019-11-15 PROCEDURE — 27201912 HC WOUND CARE DEBRIDEMENT SUPPLIES: Performed by: PODIATRIST

## 2019-11-15 PROCEDURE — 99213 OFFICE O/P EST LOW 20 MIN: CPT | Mod: ,,, | Performed by: PODIATRIST

## 2019-11-15 PROCEDURE — 80053 COMPREHEN METABOLIC PANEL: CPT

## 2019-11-15 PROCEDURE — 99213 PR OFFICE/OUTPT VISIT, EST, LEVL III, 20-29 MIN: ICD-10-PCS | Mod: ,,, | Performed by: PODIATRIST

## 2019-11-15 NOTE — PROGRESS NOTES
Labs 11/15/19  Patient on doxycycline and ciprofloxacin for osteo of the left second toe.  Still no insurance approval for IV, but appears to be improving on oral regimen at this point.   CRP has normalized.   No leukocytosis.   Creatinine 2.6 - back to baseline.   AST/ALT 65/63  - trending up.  Asymptomatic.  ? Related to either doxy or cipro which may elevate liver enzymes.    Will follow.    Repeat labs in one week.

## 2019-11-16 ENCOUNTER — HOSPITAL ENCOUNTER (OUTPATIENT)
Dept: RADIOLOGY | Facility: HOSPITAL | Age: 54
Discharge: HOME | End: 2019-11-16
Attending: INTERNAL MEDICINE
Payer: COMMERCIAL

## 2019-11-16 DIAGNOSIS — N18.9 ANEMIA OF RENAL DISEASE: ICD-10-CM

## 2019-11-16 DIAGNOSIS — N18.4 CHRONIC KIDNEY DISEASE, STAGE IV (SEVERE): ICD-10-CM

## 2019-11-16 DIAGNOSIS — D63.1 ANEMIA OF RENAL DISEASE: ICD-10-CM

## 2019-11-16 DIAGNOSIS — I10 ESSENTIAL HYPERTENSION: ICD-10-CM

## 2019-11-16 PROCEDURE — 76770 US RETROPERITONEAL COMPLETE: ICD-10-PCS | Mod: 26,,, | Performed by: RADIOLOGY

## 2019-11-16 PROCEDURE — 76770 US EXAM ABDO BACK WALL COMP: CPT | Mod: 26,,, | Performed by: RADIOLOGY

## 2019-11-16 PROCEDURE — 76770 US EXAM ABDO BACK WALL COMP: CPT | Mod: TC

## 2019-11-18 ENCOUNTER — TELEPHONE (OUTPATIENT)
Dept: INFECTIOUS DISEASES | Facility: CLINIC | Age: 54
End: 2019-11-18

## 2019-11-18 NOTE — TELEPHONE ENCOUNTER
----- Message from Veena Yang sent at 11/18/2019 11:49 AM CST -----  Contact:    Roopa  [phone]  [email or web address] /    Nurse following up with the Dr does the pt still needs IV antibiotic medication ....  Call back to further discuss

## 2019-11-22 ENCOUNTER — HOSPITAL ENCOUNTER (OUTPATIENT)
Dept: WOUND CARE | Facility: HOSPITAL | Age: 54
Discharge: HOME | End: 2019-11-22
Attending: PODIATRIST
Payer: COMMERCIAL

## 2019-11-22 ENCOUNTER — LAB VISIT (OUTPATIENT)
Dept: LAB | Facility: HOSPITAL | Age: 54
End: 2019-11-22
Attending: NURSE PRACTITIONER
Payer: COMMERCIAL

## 2019-11-22 DIAGNOSIS — Z79.2 LONG TERM (CURRENT) USE OF ANTIBIOTICS: ICD-10-CM

## 2019-11-22 DIAGNOSIS — L97.509 TYPE 2 DIABETES MELLITUS WITH FOOT ULCER, WITHOUT LONG-TERM CURRENT USE OF INSULIN: Primary | ICD-10-CM

## 2019-11-22 DIAGNOSIS — M86.9 OSTEOMYELITIS OF SECOND TOE OF LEFT FOOT: ICD-10-CM

## 2019-11-22 DIAGNOSIS — E11.621 TYPE 2 DIABETES MELLITUS WITH FOOT ULCER, WITHOUT LONG-TERM CURRENT USE OF INSULIN: Primary | ICD-10-CM

## 2019-11-22 LAB
ALBUMIN SERPL BCP-MCNC: 3.2 G/DL (ref 3.5–5.2)
ALP SERPL-CCNC: 260 U/L (ref 55–135)
ALT SERPL W/O P-5'-P-CCNC: 62 U/L (ref 10–44)
ANION GAP SERPL CALC-SCNC: 8 MMOL/L (ref 8–16)
AST SERPL-CCNC: 54 U/L (ref 10–40)
BASOPHILS # BLD AUTO: 0.04 K/UL (ref 0–0.2)
BASOPHILS NFR BLD: 0.9 % (ref 0–1.9)
BILIRUB SERPL-MCNC: 0.8 MG/DL (ref 0.1–1)
BUN SERPL-MCNC: 45 MG/DL (ref 6–20)
CALCIUM SERPL-MCNC: 9.3 MG/DL (ref 8.7–10.5)
CHLORIDE SERPL-SCNC: 104 MMOL/L (ref 95–110)
CO2 SERPL-SCNC: 24 MMOL/L (ref 23–29)
CREAT SERPL-MCNC: 2.7 MG/DL (ref 0.5–1.4)
CRP SERPL-MCNC: 5.5 MG/L (ref 0–8.2)
DIFFERENTIAL METHOD: ABNORMAL
EOSINOPHIL # BLD AUTO: 0.2 K/UL (ref 0–0.5)
EOSINOPHIL NFR BLD: 3.7 % (ref 0–8)
ERYTHROCYTE [DISTWIDTH] IN BLOOD BY AUTOMATED COUNT: 13 % (ref 11.5–14.5)
ERYTHROCYTE [SEDIMENTATION RATE] IN BLOOD BY WESTERGREN METHOD: 55 MM/HR (ref 0–10)
EST. GFR  (AFRICAN AMERICAN): 29 ML/MIN/1.73 M^2
EST. GFR  (NON AFRICAN AMERICAN): 25 ML/MIN/1.73 M^2
GLUCOSE SERPL-MCNC: 134 MG/DL (ref 70–110)
HCT VFR BLD AUTO: 40.6 % (ref 40–54)
HGB BLD-MCNC: 12.8 G/DL (ref 14–18)
IMM GRANULOCYTES # BLD AUTO: 0.01 K/UL (ref 0–0.04)
IMM GRANULOCYTES NFR BLD AUTO: 0.2 % (ref 0–0.5)
LYMPHOCYTES # BLD AUTO: 1 K/UL (ref 1–4.8)
LYMPHOCYTES NFR BLD: 22.4 % (ref 18–48)
MCH RBC QN AUTO: 26.4 PG (ref 27–31)
MCHC RBC AUTO-ENTMCNC: 31.5 G/DL (ref 32–36)
MCV RBC AUTO: 84 FL (ref 82–98)
MONOCYTES # BLD AUTO: 0.6 K/UL (ref 0.3–1)
MONOCYTES NFR BLD: 12.1 % (ref 4–15)
NEUTROPHILS # BLD AUTO: 2.8 K/UL (ref 1.8–7.7)
NEUTROPHILS NFR BLD: 60.7 % (ref 38–73)
NRBC BLD-RTO: 0 /100 WBC
PLATELET # BLD AUTO: 169 K/UL (ref 150–350)
PMV BLD AUTO: 9.4 FL (ref 9.2–12.9)
POTASSIUM SERPL-SCNC: 4.5 MMOL/L (ref 3.5–5.1)
PROT SERPL-MCNC: 7.3 G/DL (ref 6–8.4)
RBC # BLD AUTO: 4.85 M/UL (ref 4.6–6.2)
SODIUM SERPL-SCNC: 136 MMOL/L (ref 136–145)
WBC # BLD AUTO: 4.64 K/UL (ref 3.9–12.7)

## 2019-11-22 PROCEDURE — 85652 RBC SED RATE AUTOMATED: CPT

## 2019-11-22 PROCEDURE — 86140 C-REACTIVE PROTEIN: CPT

## 2019-11-22 PROCEDURE — 36415 COLL VENOUS BLD VENIPUNCTURE: CPT

## 2019-11-22 PROCEDURE — 99213 OFFICE O/P EST LOW 20 MIN: CPT | Performed by: PODIATRIST

## 2019-11-22 PROCEDURE — 99213 OFFICE O/P EST LOW 20 MIN: CPT | Mod: ,,, | Performed by: PODIATRIST

## 2019-11-22 PROCEDURE — 80053 COMPREHEN METABOLIC PANEL: CPT

## 2019-11-22 PROCEDURE — 99213 PR OFFICE/OUTPT VISIT, EST, LEVL III, 20-29 MIN: ICD-10-PCS | Mod: ,,, | Performed by: PODIATRIST

## 2019-11-22 PROCEDURE — 85025 COMPLETE CBC W/AUTO DIFF WBC: CPT

## 2019-11-27 ENCOUNTER — HOSPITAL ENCOUNTER (OUTPATIENT)
Dept: WOUND CARE | Facility: HOSPITAL | Age: 54
Discharge: HOME | End: 2019-11-27
Attending: FAMILY MEDICINE
Payer: COMMERCIAL

## 2019-11-27 ENCOUNTER — TELEPHONE (OUTPATIENT)
Dept: INFECTIOUS DISEASES | Facility: CLINIC | Age: 54
End: 2019-11-27

## 2019-11-27 DIAGNOSIS — M86.9 OSTEOMYELITIS OF SECOND TOE OF LEFT FOOT: ICD-10-CM

## 2019-11-27 DIAGNOSIS — M86.9 OSTEOMYELITIS OF SECOND TOE OF LEFT FOOT: Primary | ICD-10-CM

## 2019-11-27 DIAGNOSIS — L97.509 TYPE 2 DIABETES MELLITUS WITH FOOT ULCER, WITHOUT LONG-TERM CURRENT USE OF INSULIN: Primary | ICD-10-CM

## 2019-11-27 DIAGNOSIS — E11.621 TYPE 2 DIABETES MELLITUS WITH FOOT ULCER, WITHOUT LONG-TERM CURRENT USE OF INSULIN: Primary | ICD-10-CM

## 2019-11-27 PROCEDURE — 25000003 PHARM REV CODE 250

## 2019-11-27 PROCEDURE — 99214 PR OFFICE/OUTPT VISIT, EST, LEVL IV, 30-39 MIN: ICD-10-PCS | Mod: 25,,, | Performed by: FAMILY MEDICINE

## 2019-11-27 PROCEDURE — 17250 CHEM CAUT OF GRANLTJ TISSUE: CPT | Mod: ,,, | Performed by: FAMILY MEDICINE

## 2019-11-27 PROCEDURE — 17250 CHEM CAUT OF GRANLTJ TISSUE: CPT | Performed by: FAMILY MEDICINE

## 2019-11-27 PROCEDURE — 99214 OFFICE O/P EST MOD 30 MIN: CPT | Mod: 25,,, | Performed by: FAMILY MEDICINE

## 2019-11-27 PROCEDURE — 17250 PR CHEM CAUTERY GRANULATN TISSUE: ICD-10-PCS | Mod: ,,, | Performed by: FAMILY MEDICINE

## 2019-11-27 NOTE — TELEPHONE ENCOUNTER
Called patient re: wound care visit this morning.   Reports wounds are almost healed.   No complaints.   Will get labs on 11/29.  Call if any issues.

## 2019-11-29 ENCOUNTER — LAB VISIT (OUTPATIENT)
Dept: LAB | Facility: HOSPITAL | Age: 54
End: 2019-11-29
Payer: COMMERCIAL

## 2019-11-29 DIAGNOSIS — M86.9 OSTEOMYELITIS OF SECOND TOE OF LEFT FOOT: ICD-10-CM

## 2019-11-29 LAB
ALBUMIN SERPL BCP-MCNC: 2.7 G/DL (ref 3.5–5.2)
ALP SERPL-CCNC: 312 U/L (ref 55–135)
ALT SERPL W/O P-5'-P-CCNC: 65 U/L (ref 10–44)
ANION GAP SERPL CALC-SCNC: 4 MMOL/L (ref 8–16)
AST SERPL-CCNC: 64 U/L (ref 10–40)
BASOPHILS # BLD AUTO: 0.04 K/UL (ref 0–0.2)
BASOPHILS NFR BLD: 0.8 % (ref 0–1.9)
BILIRUB SERPL-MCNC: 0.4 MG/DL (ref 0.1–1)
BUN SERPL-MCNC: 36 MG/DL (ref 6–20)
CALCIUM SERPL-MCNC: 9.2 MG/DL (ref 8.7–10.5)
CHLORIDE SERPL-SCNC: 104 MMOL/L (ref 95–110)
CO2 SERPL-SCNC: 29 MMOL/L (ref 23–29)
CREAT SERPL-MCNC: 2.5 MG/DL (ref 0.5–1.4)
CRP SERPL-MCNC: 7.5 MG/L (ref 0–8.2)
DIFFERENTIAL METHOD: ABNORMAL
EOSINOPHIL # BLD AUTO: 0.2 K/UL (ref 0–0.5)
EOSINOPHIL NFR BLD: 3.1 % (ref 0–8)
ERYTHROCYTE [DISTWIDTH] IN BLOOD BY AUTOMATED COUNT: 12.7 % (ref 11.5–14.5)
ERYTHROCYTE [SEDIMENTATION RATE] IN BLOOD BY WESTERGREN METHOD: 34 MM/HR (ref 0–23)
EST. GFR  (AFRICAN AMERICAN): 32.2 ML/MIN/1.73 M^2
EST. GFR  (NON AFRICAN AMERICAN): 27.9 ML/MIN/1.73 M^2
GLUCOSE SERPL-MCNC: 180 MG/DL (ref 70–110)
HCT VFR BLD AUTO: 41.2 % (ref 40–54)
HGB BLD-MCNC: 12.3 G/DL (ref 14–18)
IMM GRANULOCYTES # BLD AUTO: 0.01 K/UL (ref 0–0.04)
IMM GRANULOCYTES NFR BLD AUTO: 0.2 % (ref 0–0.5)
LYMPHOCYTES # BLD AUTO: 1.3 K/UL (ref 1–4.8)
LYMPHOCYTES NFR BLD: 26.4 % (ref 18–48)
MCH RBC QN AUTO: 26.3 PG (ref 27–31)
MCHC RBC AUTO-ENTMCNC: 29.9 G/DL (ref 32–36)
MCV RBC AUTO: 88 FL (ref 82–98)
MONOCYTES # BLD AUTO: 0.5 K/UL (ref 0.3–1)
MONOCYTES NFR BLD: 10.6 % (ref 4–15)
NEUTROPHILS # BLD AUTO: 2.9 K/UL (ref 1.8–7.7)
NEUTROPHILS NFR BLD: 58.9 % (ref 38–73)
NRBC BLD-RTO: 0 /100 WBC
PLATELET # BLD AUTO: 174 K/UL (ref 150–350)
PMV BLD AUTO: 10.6 FL (ref 9.2–12.9)
POTASSIUM SERPL-SCNC: 4.4 MMOL/L (ref 3.5–5.1)
PROT SERPL-MCNC: 6.9 G/DL (ref 6–8.4)
RBC # BLD AUTO: 4.68 M/UL (ref 4.6–6.2)
SODIUM SERPL-SCNC: 137 MMOL/L (ref 136–145)
WBC # BLD AUTO: 4.89 K/UL (ref 3.9–12.7)

## 2019-11-29 PROCEDURE — 86140 C-REACTIVE PROTEIN: CPT

## 2019-11-29 PROCEDURE — 85652 RBC SED RATE AUTOMATED: CPT

## 2019-11-29 PROCEDURE — 80053 COMPREHEN METABOLIC PANEL: CPT

## 2019-11-29 PROCEDURE — 36415 COLL VENOUS BLD VENIPUNCTURE: CPT | Mod: PO

## 2019-11-29 PROCEDURE — 85025 COMPLETE CBC W/AUTO DIFF WBC: CPT

## 2019-12-02 ENCOUNTER — DOCUMENTATION ONLY (OUTPATIENT)
Dept: INFECTIOUS DISEASES | Facility: CLINIC | Age: 54
End: 2019-12-02

## 2019-12-02 DIAGNOSIS — Z79.2 LONG TERM (CURRENT) USE OF ANTIBIOTICS: ICD-10-CM

## 2019-12-02 DIAGNOSIS — M86.9 OSTEOMYELITIS OF SECOND TOE OF LEFT FOOT: ICD-10-CM

## 2019-12-02 DIAGNOSIS — M86.9 OSTEOMYELITIS OF SECOND TOE OF LEFT FOOT: Primary | ICD-10-CM

## 2019-12-02 RX ORDER — CIPROFLOXACIN 750 MG/1
750 TABLET, FILM COATED ORAL DAILY
Qty: 30 TABLET | Refills: 0 | Status: SHIPPED | OUTPATIENT
Start: 2019-12-02 | End: 2019-12-05 | Stop reason: SDUPTHER

## 2019-12-02 RX ORDER — DOXYCYCLINE 100 MG/1
100 CAPSULE ORAL EVERY 12 HOURS
Qty: 60 CAPSULE | Refills: 0 | Status: SHIPPED | OUTPATIENT
Start: 2019-12-02 | End: 2019-12-05 | Stop reason: SDUPTHER

## 2019-12-02 NOTE — TELEPHONE ENCOUNTER
Called patient and informed labs are good but a little elevated liver enzymes. Inquired if drinking alcohol or took tylenol. Patient stated took a few tylenol. Informed repeat labs on 12/06 at  after wound appt. Patient agreed to appointment date/time/location. He also confirmed he is taking both abx but will need a refill of both soon.

## 2019-12-02 NOTE — PROGRESS NOTES
Infectious Disease - Lab follow up   Labs 11/29/19  Dx: Osteo left 2nd toe  Antibiotics: ciprofloxacin and doxycycline  Estimated End Date:     WBC - 4.89  H/H - 12.3/41  Platelets - 174  Creatinine - 2.5 (baseline)  ESR - 34  CRP - 7.5  AST/ALT - 64/65    Still with mild elevation in LFT  ? Antibiotic related.  No symptoms.  T bili wnl. Will monitor

## 2019-12-02 NOTE — TELEPHONE ENCOUNTER
----- Message from DANUTA Salamanca, ANP sent at 12/2/2019  8:16 AM CST -----  Please call Mr. Mcfadden and tell him his labs are good, though he has a mild elevation in his liver enzymes.  Does he drink alcohol or is he taking tylenol?  Want to repeat labs when he goes for Podiatry visit on Friday 12/6.  Will you schedule at  (double check with him).  Also, please confirm that he is taking the cipro AND the doxycycline.  Let me know if he needs refills. Thanks.

## 2019-12-05 ENCOUNTER — TELEPHONE (OUTPATIENT)
Dept: INFECTIOUS DISEASES | Facility: CLINIC | Age: 54
End: 2019-12-05

## 2019-12-05 ENCOUNTER — PATIENT MESSAGE (OUTPATIENT)
Dept: INFECTIOUS DISEASES | Facility: CLINIC | Age: 54
End: 2019-12-05

## 2019-12-05 DIAGNOSIS — M86.9 OSTEOMYELITIS OF SECOND TOE OF LEFT FOOT: ICD-10-CM

## 2019-12-05 RX ORDER — DOXYCYCLINE 100 MG/1
100 CAPSULE ORAL EVERY 12 HOURS
Qty: 60 CAPSULE | Refills: 0 | Status: SHIPPED | OUTPATIENT
Start: 2019-12-05 | End: 2020-01-04

## 2019-12-05 RX ORDER — CIPROFLOXACIN 750 MG/1
750 TABLET, FILM COATED ORAL DAILY
Qty: 30 TABLET | Refills: 0 | Status: SHIPPED | OUTPATIENT
Start: 2019-12-05 | End: 2020-01-04

## 2019-12-05 NOTE — TELEPHONE ENCOUNTER
Message from patient that he needs antibiotic prescriptions sent to [facility] on [address] rather than [facility].  Re-ordered and sent to [facility]

## 2019-12-06 ENCOUNTER — HOSPITAL ENCOUNTER (OUTPATIENT)
Dept: WOUND CARE | Facility: HOSPITAL | Age: 54
Discharge: HOME | End: 2019-12-06
Attending: PODIATRIST
Payer: COMMERCIAL

## 2019-12-06 ENCOUNTER — LAB VISIT (OUTPATIENT)
Dept: LAB | Facility: HOSPITAL | Age: 54
End: 2019-12-06
Attending: NURSE PRACTITIONER
Payer: COMMERCIAL

## 2019-12-06 DIAGNOSIS — M86.9 OSTEOMYELITIS OF SECOND TOE OF LEFT FOOT: ICD-10-CM

## 2019-12-06 DIAGNOSIS — L97.509 TYPE 2 DIABETES MELLITUS WITH FOOT ULCER, WITHOUT LONG-TERM CURRENT USE OF INSULIN: Primary | ICD-10-CM

## 2019-12-06 DIAGNOSIS — Z79.2 LONG TERM (CURRENT) USE OF ANTIBIOTICS: ICD-10-CM

## 2019-12-06 DIAGNOSIS — E11.621 TYPE 2 DIABETES MELLITUS WITH FOOT ULCER, WITHOUT LONG-TERM CURRENT USE OF INSULIN: Primary | ICD-10-CM

## 2019-12-06 LAB
ALBUMIN SERPL BCP-MCNC: 2.9 G/DL (ref 3.5–5.2)
ALP SERPL-CCNC: 312 U/L (ref 55–135)
ALT SERPL W/O P-5'-P-CCNC: 67 U/L (ref 10–44)
ANION GAP SERPL CALC-SCNC: 7 MMOL/L (ref 8–16)
AST SERPL-CCNC: 63 U/L (ref 10–40)
BASOPHILS # BLD AUTO: 0.05 K/UL (ref 0–0.2)
BASOPHILS NFR BLD: 1.1 % (ref 0–1.9)
BILIRUB SERPL-MCNC: 0.3 MG/DL (ref 0.1–1)
BUN SERPL-MCNC: 37 MG/DL (ref 6–20)
CALCIUM SERPL-MCNC: 8.7 MG/DL (ref 8.7–10.5)
CHLORIDE SERPL-SCNC: 104 MMOL/L (ref 95–110)
CO2 SERPL-SCNC: 25 MMOL/L (ref 23–29)
CREAT SERPL-MCNC: 2.4 MG/DL (ref 0.5–1.4)
CRP SERPL-MCNC: 6.2 MG/L (ref 0–8.2)
DIFFERENTIAL METHOD: ABNORMAL
EOSINOPHIL # BLD AUTO: 0.2 K/UL (ref 0–0.5)
EOSINOPHIL NFR BLD: 3.5 % (ref 0–8)
ERYTHROCYTE [DISTWIDTH] IN BLOOD BY AUTOMATED COUNT: 12.9 % (ref 11.5–14.5)
ERYTHROCYTE [SEDIMENTATION RATE] IN BLOOD BY WESTERGREN METHOD: 43 MM/HR (ref 0–10)
EST. GFR  (AFRICAN AMERICAN): 34 ML/MIN/1.73 M^2
EST. GFR  (NON AFRICAN AMERICAN): 29 ML/MIN/1.73 M^2
GLUCOSE SERPL-MCNC: 150 MG/DL (ref 70–110)
HCT VFR BLD AUTO: 42.7 % (ref 40–54)
HGB BLD-MCNC: 13.2 G/DL (ref 14–18)
IMM GRANULOCYTES # BLD AUTO: 0.02 K/UL (ref 0–0.04)
IMM GRANULOCYTES NFR BLD AUTO: 0.4 % (ref 0–0.5)
LYMPHOCYTES # BLD AUTO: 1 K/UL (ref 1–4.8)
LYMPHOCYTES NFR BLD: 21.4 % (ref 18–48)
MCH RBC QN AUTO: 26.3 PG (ref 27–31)
MCHC RBC AUTO-ENTMCNC: 30.9 G/DL (ref 32–36)
MCV RBC AUTO: 85 FL (ref 82–98)
MONOCYTES # BLD AUTO: 0.5 K/UL (ref 0.3–1)
MONOCYTES NFR BLD: 9.7 % (ref 4–15)
NEUTROPHILS # BLD AUTO: 3 K/UL (ref 1.8–7.7)
NEUTROPHILS NFR BLD: 63.9 % (ref 38–73)
NRBC BLD-RTO: 0 /100 WBC
PLATELET # BLD AUTO: 193 K/UL (ref 150–350)
PMV BLD AUTO: 9.8 FL (ref 9.2–12.9)
POTASSIUM SERPL-SCNC: 4.9 MMOL/L (ref 3.5–5.1)
PROT SERPL-MCNC: 7.1 G/DL (ref 6–8.4)
RBC # BLD AUTO: 5.01 M/UL (ref 4.6–6.2)
SODIUM SERPL-SCNC: 136 MMOL/L (ref 136–145)
WBC # BLD AUTO: 4.62 K/UL (ref 3.9–12.7)

## 2019-12-06 PROCEDURE — 99213 OFFICE O/P EST LOW 20 MIN: CPT | Mod: ,,, | Performed by: PODIATRIST

## 2019-12-06 PROCEDURE — 86140 C-REACTIVE PROTEIN: CPT

## 2019-12-06 PROCEDURE — 36415 COLL VENOUS BLD VENIPUNCTURE: CPT

## 2019-12-06 PROCEDURE — 27201912 HC WOUND CARE DEBRIDEMENT SUPPLIES: Performed by: PODIATRIST

## 2019-12-06 PROCEDURE — 85652 RBC SED RATE AUTOMATED: CPT

## 2019-12-06 PROCEDURE — 97597 DBRDMT OPN WND 1ST 20 CM/<: CPT | Performed by: PODIATRIST

## 2019-12-06 PROCEDURE — 99213 PR OFFICE/OUTPT VISIT, EST, LEVL III, 20-29 MIN: ICD-10-PCS | Mod: ,,, | Performed by: PODIATRIST

## 2019-12-06 PROCEDURE — 80053 COMPREHEN METABOLIC PANEL: CPT

## 2019-12-06 PROCEDURE — 85025 COMPLETE CBC W/AUTO DIFF WBC: CPT

## 2019-12-07 ENCOUNTER — TELEPHONE (OUTPATIENT)
Dept: INFECTIOUS DISEASES | Facility: CLINIC | Age: 54
End: 2019-12-07

## 2019-12-07 NOTE — TELEPHONE ENCOUNTER
12/6 labs reviewed.   CRP remains wnl.   Will communicate with Podiatry re: wound healing.   Need to set up follow up with me.   Plan on estimated end of care 12/14 if wound healed.

## 2019-12-09 ENCOUNTER — PATIENT MESSAGE (OUTPATIENT)
Dept: INFECTIOUS DISEASES | Facility: CLINIC | Age: 54
End: 2019-12-09

## 2019-12-09 ENCOUNTER — NURSE TRIAGE (OUTPATIENT)
Dept: ADMINISTRATIVE | Facility: CLINIC | Age: 54
End: 2019-12-09

## 2019-12-10 NOTE — TELEPHONE ENCOUNTER
Reason for Disposition   Caller has NON-URGENT medication question about med that PCP prescribed and triager unable to answer question    Protocols used: MEDICATION QUESTION CALL-A-AH    Catalino needs a JORDYN number in order to fill cipro and doxycycline because it is through workers comp. Pharmacy states initially they had the script ready tonight but when he came to pick it up and they realized he was running it through workers comp they were advised it would need JORDYN to be filled. Advised this would have to be addressed in the morning and would send message to provider. Please contact caller with any further care advice.

## 2019-12-10 NOTE — TELEPHONE ENCOUNTER
Called Pharmacy:  Confirmed that Worker's Comp needs JORDYN number for antibiotic.   Have given verbal order for prescribed antibiotics with collaborative physician's JORDYN.

## 2019-12-11 ENCOUNTER — TELEPHONE (OUTPATIENT)
Dept: INFECTIOUS DISEASES | Facility: HOSPITAL | Age: 54
End: 2019-12-11

## 2019-12-11 DIAGNOSIS — M86.9 OSTEOMYELITIS OF SECOND TOE OF LEFT FOOT: Primary | ICD-10-CM

## 2019-12-12 ENCOUNTER — TELEPHONE (OUTPATIENT)
Dept: INFECTIOUS DISEASES | Facility: CLINIC | Age: 54
End: 2019-12-12

## 2019-12-12 NOTE — TELEPHONE ENCOUNTER
Called patient and informed of labs appointment. Patient stated Colton already told him but thank you for the call.

## 2019-12-12 NOTE — TELEPHONE ENCOUNTER
"Worker's Comp finally approved IV abx, but patient has now completed close to 6 weeks of oral with significan improvement.  Per Podiatry, wound almost healed.  Will continue oral regimen for now.   Patient did finally  refill of orals.  He denies fevers, chills, pain.  "Feels great."  Will check labs on Friday, he will follow up with Podiatry on Friday, then need to set up follow up with me.       "

## 2019-12-13 ENCOUNTER — LAB VISIT (OUTPATIENT)
Dept: LAB | Facility: HOSPITAL | Age: 54
End: 2019-12-13
Attending: NURSE PRACTITIONER
Payer: COMMERCIAL

## 2019-12-13 ENCOUNTER — HOSPITAL ENCOUNTER (OUTPATIENT)
Dept: WOUND CARE | Facility: HOSPITAL | Age: 54
Discharge: HOME | End: 2019-12-13
Attending: PODIATRIST
Payer: COMMERCIAL

## 2019-12-13 DIAGNOSIS — M86.9 OSTEOMYELITIS OF SECOND TOE OF RIGHT FOOT: ICD-10-CM

## 2019-12-13 DIAGNOSIS — M86.9 OSTEOMYELITIS OF SECOND TOE OF LEFT FOOT: ICD-10-CM

## 2019-12-13 DIAGNOSIS — L97.509 TYPE 2 DIABETES MELLITUS WITH FOOT ULCER, WITHOUT LONG-TERM CURRENT USE OF INSULIN: Primary | ICD-10-CM

## 2019-12-13 DIAGNOSIS — E11.621 TYPE 2 DIABETES MELLITUS WITH FOOT ULCER, WITHOUT LONG-TERM CURRENT USE OF INSULIN: Primary | ICD-10-CM

## 2019-12-13 LAB
ALBUMIN SERPL BCP-MCNC: 3.2 G/DL (ref 3.5–5.2)
ALP SERPL-CCNC: 314 U/L (ref 55–135)
ALT SERPL W/O P-5'-P-CCNC: 62 U/L (ref 10–44)
ANION GAP SERPL CALC-SCNC: 6 MMOL/L (ref 8–16)
AST SERPL-CCNC: 61 U/L (ref 10–40)
BASOPHILS # BLD AUTO: 0.03 K/UL (ref 0–0.2)
BASOPHILS NFR BLD: 0.5 % (ref 0–1.9)
BILIRUB SERPL-MCNC: 0.7 MG/DL (ref 0.1–1)
BUN SERPL-MCNC: 44 MG/DL (ref 6–20)
CALCIUM SERPL-MCNC: 9.4 MG/DL (ref 8.7–10.5)
CHLORIDE SERPL-SCNC: 102 MMOL/L (ref 95–110)
CO2 SERPL-SCNC: 27 MMOL/L (ref 23–29)
CREAT SERPL-MCNC: 2.7 MG/DL (ref 0.5–1.4)
CRP SERPL-MCNC: 10.9 MG/L (ref 0–8.2)
DIFFERENTIAL METHOD: ABNORMAL
EOSINOPHIL # BLD AUTO: 0.2 K/UL (ref 0–0.5)
EOSINOPHIL NFR BLD: 3 % (ref 0–8)
ERYTHROCYTE [DISTWIDTH] IN BLOOD BY AUTOMATED COUNT: 12.7 % (ref 11.5–14.5)
ERYTHROCYTE [SEDIMENTATION RATE] IN BLOOD BY WESTERGREN METHOD: 35 MM/HR (ref 0–10)
EST. GFR  (AFRICAN AMERICAN): 29 ML/MIN/1.73 M^2
EST. GFR  (NON AFRICAN AMERICAN): 25 ML/MIN/1.73 M^2
GLUCOSE SERPL-MCNC: 158 MG/DL (ref 70–110)
GRAM STN SPEC: NORMAL
GRAM STN SPEC: NORMAL
HCT VFR BLD AUTO: 44.8 % (ref 40–54)
HGB BLD-MCNC: 14 G/DL (ref 14–18)
IMM GRANULOCYTES # BLD AUTO: 0.02 K/UL (ref 0–0.04)
IMM GRANULOCYTES NFR BLD AUTO: 0.4 % (ref 0–0.5)
LYMPHOCYTES # BLD AUTO: 1.2 K/UL (ref 1–4.8)
LYMPHOCYTES NFR BLD: 20.9 % (ref 18–48)
MCH RBC QN AUTO: 26.2 PG (ref 27–31)
MCHC RBC AUTO-ENTMCNC: 31.3 G/DL (ref 32–36)
MCV RBC AUTO: 84 FL (ref 82–98)
MONOCYTES # BLD AUTO: 0.6 K/UL (ref 0.3–1)
MONOCYTES NFR BLD: 9.7 % (ref 4–15)
NEUTROPHILS # BLD AUTO: 3.7 K/UL (ref 1.8–7.7)
NEUTROPHILS NFR BLD: 65.5 % (ref 38–73)
NRBC BLD-RTO: 0 /100 WBC
PLATELET # BLD AUTO: 229 K/UL (ref 150–350)
PMV BLD AUTO: 9.5 FL (ref 9.2–12.9)
POTASSIUM SERPL-SCNC: 4.7 MMOL/L (ref 3.5–5.1)
PROT SERPL-MCNC: 7.7 G/DL (ref 6–8.4)
RBC # BLD AUTO: 5.35 M/UL (ref 4.6–6.2)
SODIUM SERPL-SCNC: 135 MMOL/L (ref 136–145)
WBC # BLD AUTO: 5.65 K/UL (ref 3.9–12.7)

## 2019-12-13 PROCEDURE — 20240 BONE BIOPSY OPEN SUPERFICIAL: CPT | Performed by: PODIATRIST

## 2019-12-13 PROCEDURE — 11044 DBRDMT BONE 1ST 20 SQ CM/<: CPT | Performed by: PODIATRIST

## 2019-12-13 PROCEDURE — 11044 PR DEBRIDEMENT, SKIN, SUB-Q TISSUE,MUSCLE,BONE,=<20 SQ CM: ICD-10-PCS | Mod: ,,, | Performed by: PODIATRIST

## 2019-12-13 PROCEDURE — 88305 TISSUE EXAM BY PATHOLOGIST: ICD-10-PCS | Mod: 26,,, | Performed by: PATHOLOGY

## 2019-12-13 PROCEDURE — 85025 COMPLETE CBC W/AUTO DIFF WBC: CPT

## 2019-12-13 PROCEDURE — 36415 COLL VENOUS BLD VENIPUNCTURE: CPT

## 2019-12-13 PROCEDURE — 87205 SMEAR GRAM STAIN: CPT

## 2019-12-13 PROCEDURE — 27201912 HC WOUND CARE DEBRIDEMENT SUPPLIES: Performed by: PODIATRIST

## 2019-12-13 PROCEDURE — 99499 UNLISTED E&M SERVICE: CPT | Mod: ,,, | Performed by: PODIATRIST

## 2019-12-13 PROCEDURE — 99499 NO LOS: ICD-10-PCS | Mod: ,,, | Performed by: PODIATRIST

## 2019-12-13 PROCEDURE — 86140 C-REACTIVE PROTEIN: CPT

## 2019-12-13 PROCEDURE — 88311 DECALCIFY TISSUE: CPT | Mod: 26,,, | Performed by: PATHOLOGY

## 2019-12-13 PROCEDURE — 11044 DBRDMT BONE 1ST 20 SQ CM/<: CPT | Mod: ,,, | Performed by: PODIATRIST

## 2019-12-13 PROCEDURE — 85652 RBC SED RATE AUTOMATED: CPT

## 2019-12-13 PROCEDURE — 25000003 PHARM REV CODE 250

## 2019-12-13 PROCEDURE — 88311 DECALCIFY TISSUE: CPT | Performed by: PATHOLOGY

## 2019-12-13 PROCEDURE — 80053 COMPREHEN METABOLIC PANEL: CPT

## 2019-12-13 PROCEDURE — 88311 PR  DECALCIFY TISSUE: ICD-10-PCS | Mod: 26,,, | Performed by: PATHOLOGY

## 2019-12-13 PROCEDURE — 88305 TISSUE EXAM BY PATHOLOGIST: CPT | Performed by: PATHOLOGY

## 2019-12-13 PROCEDURE — 87075 CULTR BACTERIA EXCEPT BLOOD: CPT

## 2019-12-13 PROCEDURE — 88305 TISSUE EXAM BY PATHOLOGIST: CPT | Mod: 26,,, | Performed by: PATHOLOGY

## 2019-12-13 PROCEDURE — 87070 CULTURE OTHR SPECIMN AEROBIC: CPT

## 2019-12-16 ENCOUNTER — TELEPHONE (OUTPATIENT)
Dept: INFECTIOUS DISEASES | Facility: CLINIC | Age: 54
End: 2019-12-16

## 2019-12-16 DIAGNOSIS — Z79.2 LONG TERM (CURRENT) USE OF ANTIBIOTICS: ICD-10-CM

## 2019-12-16 DIAGNOSIS — M86.9 OSTEOMYELITIS OF SECOND TOE OF LEFT FOOT: Primary | ICD-10-CM

## 2019-12-17 LAB
BACTERIA SPEC AEROBE CULT: NO GROWTH
FINAL PATHOLOGIC DIAGNOSIS: NORMAL
GROSS: NORMAL

## 2019-12-17 NOTE — TELEPHONE ENCOUNTER
Weekly labs - CRP had normalized, but now elevated.   Called Podiatry and advised that Left toe wound has now healed, but now has new right toe wound.   Cultures and path are pending.   Still with mild stable elevation in liver enzymes (less than 2X ULN).  May be related to antibiotics  Will monitor and if do not resolve after stopping abx, will proceed with further evaluation.   Discussed with patient. Has wound care on Friday and will repeat labs. Will follow up cultures and path

## 2019-12-19 LAB — BACTERIA SPEC ANAEROBE CULT: ABNORMAL

## 2019-12-20 ENCOUNTER — LAB VISIT (OUTPATIENT)
Dept: LAB | Facility: HOSPITAL | Age: 54
End: 2019-12-20
Attending: NURSE PRACTITIONER
Payer: COMMERCIAL

## 2019-12-20 ENCOUNTER — HOSPITAL ENCOUNTER (OUTPATIENT)
Dept: WOUND CARE | Facility: HOSPITAL | Age: 54
Discharge: HOME | End: 2019-12-20
Attending: PODIATRIST
Payer: COMMERCIAL

## 2019-12-20 DIAGNOSIS — Z79.2 LONG TERM (CURRENT) USE OF ANTIBIOTICS: ICD-10-CM

## 2019-12-20 DIAGNOSIS — M86.9 OSTEOMYELITIS OF SECOND TOE OF LEFT FOOT: ICD-10-CM

## 2019-12-20 DIAGNOSIS — E11.621 TYPE 2 DIABETES MELLITUS WITH FOOT ULCER, WITHOUT LONG-TERM CURRENT USE OF INSULIN: Primary | ICD-10-CM

## 2019-12-20 DIAGNOSIS — L97.509 TYPE 2 DIABETES MELLITUS WITH FOOT ULCER, WITHOUT LONG-TERM CURRENT USE OF INSULIN: Primary | ICD-10-CM

## 2019-12-20 DIAGNOSIS — M86.9 OSTEOMYELITIS OF SECOND TOE OF RIGHT FOOT: ICD-10-CM

## 2019-12-20 LAB
ALBUMIN SERPL BCP-MCNC: 3.1 G/DL (ref 3.5–5.2)
ALP SERPL-CCNC: 338 U/L (ref 55–135)
ALT SERPL W/O P-5'-P-CCNC: 64 U/L (ref 10–44)
ANION GAP SERPL CALC-SCNC: 7 MMOL/L (ref 8–16)
AST SERPL-CCNC: 46 U/L (ref 10–40)
BASOPHILS # BLD AUTO: 0.05 K/UL (ref 0–0.2)
BASOPHILS NFR BLD: 0.9 % (ref 0–1.9)
BILIRUB SERPL-MCNC: 0.5 MG/DL (ref 0.1–1)
BUN SERPL-MCNC: 36 MG/DL (ref 6–20)
CALCIUM SERPL-MCNC: 9.3 MG/DL (ref 8.7–10.5)
CHLORIDE SERPL-SCNC: 103 MMOL/L (ref 95–110)
CO2 SERPL-SCNC: 26 MMOL/L (ref 23–29)
CREAT SERPL-MCNC: 2.5 MG/DL (ref 0.5–1.4)
CRP SERPL-MCNC: 5.4 MG/L (ref 0–8.2)
DIFFERENTIAL METHOD: ABNORMAL
EOSINOPHIL # BLD AUTO: 0.2 K/UL (ref 0–0.5)
EOSINOPHIL NFR BLD: 2.9 % (ref 0–8)
ERYTHROCYTE [DISTWIDTH] IN BLOOD BY AUTOMATED COUNT: 12.7 % (ref 11.5–14.5)
EST. GFR  (AFRICAN AMERICAN): 32 ML/MIN/1.73 M^2
EST. GFR  (NON AFRICAN AMERICAN): 28 ML/MIN/1.73 M^2
GLUCOSE SERPL-MCNC: 181 MG/DL (ref 70–110)
HCT VFR BLD AUTO: 44.9 % (ref 40–54)
HGB BLD-MCNC: 14.2 G/DL (ref 14–18)
IMM GRANULOCYTES # BLD AUTO: 0.03 K/UL (ref 0–0.04)
IMM GRANULOCYTES NFR BLD AUTO: 0.5 % (ref 0–0.5)
LYMPHOCYTES # BLD AUTO: 1.3 K/UL (ref 1–4.8)
LYMPHOCYTES NFR BLD: 22.4 % (ref 18–48)
MCH RBC QN AUTO: 26.3 PG (ref 27–31)
MCHC RBC AUTO-ENTMCNC: 31.6 G/DL (ref 32–36)
MCV RBC AUTO: 83 FL (ref 82–98)
MONOCYTES # BLD AUTO: 0.5 K/UL (ref 0.3–1)
MONOCYTES NFR BLD: 8.4 % (ref 4–15)
NEUTROPHILS # BLD AUTO: 3.8 K/UL (ref 1.8–7.7)
NEUTROPHILS NFR BLD: 64.9 % (ref 38–73)
NRBC BLD-RTO: 0 /100 WBC
PLATELET # BLD AUTO: 237 K/UL (ref 150–350)
PMV BLD AUTO: 9.3 FL (ref 9.2–12.9)
POTASSIUM SERPL-SCNC: 4.4 MMOL/L (ref 3.5–5.1)
PROT SERPL-MCNC: 7.7 G/DL (ref 6–8.4)
RBC # BLD AUTO: 5.39 M/UL (ref 4.6–6.2)
SODIUM SERPL-SCNC: 136 MMOL/L (ref 136–145)
WBC # BLD AUTO: 5.81 K/UL (ref 3.9–12.7)

## 2019-12-20 PROCEDURE — 86140 C-REACTIVE PROTEIN: CPT

## 2019-12-20 PROCEDURE — 80053 COMPREHEN METABOLIC PANEL: CPT

## 2019-12-20 PROCEDURE — 36415 COLL VENOUS BLD VENIPUNCTURE: CPT

## 2019-12-20 PROCEDURE — 99213 OFFICE O/P EST LOW 20 MIN: CPT | Mod: ,,, | Performed by: PODIATRIST

## 2019-12-20 PROCEDURE — 99213 PR OFFICE/OUTPT VISIT, EST, LEVL III, 20-29 MIN: ICD-10-PCS | Mod: ,,, | Performed by: PODIATRIST

## 2019-12-20 PROCEDURE — 99213 OFFICE O/P EST LOW 20 MIN: CPT | Performed by: PODIATRIST

## 2019-12-20 PROCEDURE — 85025 COMPLETE CBC W/AUTO DIFF WBC: CPT

## 2019-12-26 ENCOUNTER — TELEPHONE (OUTPATIENT)
Dept: INFECTIOUS DISEASES | Facility: HOSPITAL | Age: 54
End: 2019-12-26

## 2019-12-26 DIAGNOSIS — M86.9 OSTEOMYELITIS OF SECOND TOE OF LEFT FOOT: Primary | ICD-10-CM

## 2019-12-26 DIAGNOSIS — Z79.2 LONG TERM (CURRENT) USE OF ANTIBIOTICS: ICD-10-CM

## 2019-12-27 ENCOUNTER — TELEPHONE (OUTPATIENT)
Dept: INFECTIOUS DISEASES | Facility: CLINIC | Age: 54
End: 2019-12-27

## 2019-12-27 ENCOUNTER — LAB VISIT (OUTPATIENT)
Dept: LAB | Facility: HOSPITAL | Age: 54
End: 2019-12-27
Attending: NURSE PRACTITIONER
Payer: COMMERCIAL

## 2019-12-27 ENCOUNTER — HOSPITAL ENCOUNTER (OUTPATIENT)
Dept: WOUND CARE | Facility: HOSPITAL | Age: 54
Discharge: HOME | End: 2019-12-27
Attending: INTERNAL MEDICINE
Payer: COMMERCIAL

## 2019-12-27 DIAGNOSIS — M86.9 OSTEOMYELITIS OF SECOND TOE OF LEFT FOOT: ICD-10-CM

## 2019-12-27 DIAGNOSIS — Z79.2 LONG TERM (CURRENT) USE OF ANTIBIOTICS: ICD-10-CM

## 2019-12-27 LAB
ALBUMIN SERPL BCP-MCNC: 2.9 G/DL (ref 3.5–5.2)
ALP SERPL-CCNC: 328 U/L (ref 55–135)
ALT SERPL W/O P-5'-P-CCNC: 51 U/L (ref 10–44)
ANION GAP SERPL CALC-SCNC: 6 MMOL/L (ref 8–16)
AST SERPL-CCNC: 42 U/L (ref 10–40)
BASOPHILS # BLD AUTO: 0.05 K/UL (ref 0–0.2)
BASOPHILS NFR BLD: 0.8 % (ref 0–1.9)
BILIRUB SERPL-MCNC: 0.6 MG/DL (ref 0.1–1)
BUN SERPL-MCNC: 40 MG/DL (ref 6–20)
CALCIUM SERPL-MCNC: 9.4 MG/DL (ref 8.7–10.5)
CHLORIDE SERPL-SCNC: 99 MMOL/L (ref 95–110)
CO2 SERPL-SCNC: 29 MMOL/L (ref 23–29)
CREAT SERPL-MCNC: 2.7 MG/DL (ref 0.5–1.4)
CRP SERPL-MCNC: 5.9 MG/L (ref 0–8.2)
DIFFERENTIAL METHOD: ABNORMAL
EOSINOPHIL # BLD AUTO: 0.2 K/UL (ref 0–0.5)
EOSINOPHIL NFR BLD: 3.3 % (ref 0–8)
ERYTHROCYTE [DISTWIDTH] IN BLOOD BY AUTOMATED COUNT: 12.6 % (ref 11.5–14.5)
EST. GFR  (AFRICAN AMERICAN): 29 ML/MIN/1.73 M^2
EST. GFR  (NON AFRICAN AMERICAN): 25 ML/MIN/1.73 M^2
GLUCOSE SERPL-MCNC: 231 MG/DL (ref 70–110)
HCT VFR BLD AUTO: 43 % (ref 40–54)
HGB BLD-MCNC: 13.6 G/DL (ref 14–18)
IMM GRANULOCYTES # BLD AUTO: 0.02 K/UL (ref 0–0.04)
IMM GRANULOCYTES NFR BLD AUTO: 0.3 % (ref 0–0.5)
LYMPHOCYTES # BLD AUTO: 1.1 K/UL (ref 1–4.8)
LYMPHOCYTES NFR BLD: 18.9 % (ref 18–48)
MCH RBC QN AUTO: 26.3 PG (ref 27–31)
MCHC RBC AUTO-ENTMCNC: 31.6 G/DL (ref 32–36)
MCV RBC AUTO: 83 FL (ref 82–98)
MONOCYTES # BLD AUTO: 0.5 K/UL (ref 0.3–1)
MONOCYTES NFR BLD: 8.4 % (ref 4–15)
NEUTROPHILS # BLD AUTO: 4.1 K/UL (ref 1.8–7.7)
NEUTROPHILS NFR BLD: 68.3 % (ref 38–73)
NRBC BLD-RTO: 0 /100 WBC
PLATELET # BLD AUTO: 205 K/UL (ref 150–350)
PMV BLD AUTO: 10.1 FL (ref 9.2–12.9)
POTASSIUM SERPL-SCNC: 4.9 MMOL/L (ref 3.5–5.1)
PROT SERPL-MCNC: 7.4 G/DL (ref 6–8.4)
RBC # BLD AUTO: 5.17 M/UL (ref 4.6–6.2)
SODIUM SERPL-SCNC: 134 MMOL/L (ref 136–145)
WBC # BLD AUTO: 5.98 K/UL (ref 3.9–12.7)

## 2019-12-27 PROCEDURE — 36415 COLL VENOUS BLD VENIPUNCTURE: CPT

## 2019-12-27 PROCEDURE — 99212 OFFICE O/P EST SF 10 MIN: CPT

## 2019-12-27 PROCEDURE — 85025 COMPLETE CBC W/AUTO DIFF WBC: CPT

## 2019-12-27 PROCEDURE — 86140 C-REACTIVE PROTEIN: CPT

## 2019-12-27 PROCEDURE — 80053 COMPREHEN METABOLIC PANEL: CPT

## 2019-12-27 NOTE — TELEPHONE ENCOUNTER
Called patient and reminded of labs this morning. Patient stated just spoke with her and will go to lab.

## 2020-01-03 ENCOUNTER — OFFICE VISIT (OUTPATIENT)
Dept: CARDIOLOGY | Facility: CLINIC | Age: 55
End: 2020-01-03
Payer: COMMERCIAL

## 2020-01-03 ENCOUNTER — HOSPITAL ENCOUNTER (OUTPATIENT)
Dept: WOUND CARE | Facility: HOSPITAL | Age: 55
Discharge: HOME | End: 2020-01-03
Attending: PODIATRIST
Payer: COMMERCIAL

## 2020-01-03 ENCOUNTER — PATIENT OUTREACH (OUTPATIENT)
Dept: ADMINISTRATIVE | Facility: OTHER | Age: 55
End: 2020-01-03

## 2020-01-03 VITALS
RESPIRATION RATE: 16 BRPM | SYSTOLIC BLOOD PRESSURE: 184 MMHG | OXYGEN SATURATION: 98 % | HEART RATE: 80 BPM | WEIGHT: 257.88 LBS | DIASTOLIC BLOOD PRESSURE: 102 MMHG | BODY MASS INDEX: 34.02 KG/M2

## 2020-01-03 DIAGNOSIS — E11.621 TYPE 2 DIABETES MELLITUS WITH FOOT ULCER, WITHOUT LONG-TERM CURRENT USE OF INSULIN: Primary | ICD-10-CM

## 2020-01-03 DIAGNOSIS — I10 ESSENTIAL HYPERTENSION: Primary | ICD-10-CM

## 2020-01-03 DIAGNOSIS — I10 ESSENTIAL HYPERTENSION: ICD-10-CM

## 2020-01-03 DIAGNOSIS — L97.509 TYPE 2 DIABETES MELLITUS WITH FOOT ULCER, WITHOUT LONG-TERM CURRENT USE OF INSULIN: Primary | ICD-10-CM

## 2020-01-03 PROCEDURE — 3008F PR BODY MASS INDEX (BMI) DOCUMENTED: ICD-10-PCS | Mod: CPTII,S$GLB,, | Performed by: INTERNAL MEDICINE

## 2020-01-03 PROCEDURE — 99214 PR OFFICE/OUTPT VISIT, EST, LEVL IV, 30-39 MIN: ICD-10-PCS | Mod: ,,, | Performed by: PODIATRIST

## 2020-01-03 PROCEDURE — 99212 OFFICE O/P EST SF 10 MIN: CPT | Performed by: PODIATRIST

## 2020-01-03 PROCEDURE — 99999 PR PBB SHADOW E&M-EST. PATIENT-LVL III: CPT | Mod: PBBFAC,,, | Performed by: INTERNAL MEDICINE

## 2020-01-03 PROCEDURE — 3077F PR MOST RECENT SYSTOLIC BLOOD PRESSURE >= 140 MM HG: ICD-10-PCS | Mod: CPTII,S$GLB,, | Performed by: INTERNAL MEDICINE

## 2020-01-03 PROCEDURE — 3077F SYST BP >= 140 MM HG: CPT | Mod: CPTII,S$GLB,, | Performed by: INTERNAL MEDICINE

## 2020-01-03 PROCEDURE — 99204 PR OFFICE/OUTPT VISIT, NEW, LEVL IV, 45-59 MIN: ICD-10-PCS | Mod: S$GLB,,, | Performed by: INTERNAL MEDICINE

## 2020-01-03 PROCEDURE — 3080F PR MOST RECENT DIASTOLIC BLOOD PRESSURE >= 90 MM HG: ICD-10-PCS | Mod: CPTII,S$GLB,, | Performed by: INTERNAL MEDICINE

## 2020-01-03 PROCEDURE — 99214 OFFICE O/P EST MOD 30 MIN: CPT | Mod: ,,, | Performed by: PODIATRIST

## 2020-01-03 PROCEDURE — 3080F DIAST BP >= 90 MM HG: CPT | Mod: CPTII,S$GLB,, | Performed by: INTERNAL MEDICINE

## 2020-01-03 PROCEDURE — 99999 PR PBB SHADOW E&M-EST. PATIENT-LVL III: ICD-10-PCS | Mod: PBBFAC,,, | Performed by: INTERNAL MEDICINE

## 2020-01-03 PROCEDURE — 3008F BODY MASS INDEX DOCD: CPT | Mod: CPTII,S$GLB,, | Performed by: INTERNAL MEDICINE

## 2020-01-03 PROCEDURE — 99204 OFFICE O/P NEW MOD 45 MIN: CPT | Mod: S$GLB,,, | Performed by: INTERNAL MEDICINE

## 2020-01-03 RX ORDER — DILTIAZEM HYDROCHLORIDE 360 MG/1
360 CAPSULE, EXTENDED RELEASE ORAL DAILY
Qty: 90 CAPSULE | Refills: 3 | Status: ON HOLD | OUTPATIENT
Start: 2020-01-03 | End: 2020-07-21 | Stop reason: HOSPADM

## 2020-01-03 NOTE — LETTER
January 3, 2020      Aviva Martinez, DPM  [address]  [place] [address]           [facility]  [address]  [place] [address]  Phone: [phone]          Patient: Catalino Mcfadden   MR Number: [id number]   YOB: 1965   Date of Visit: 1/3/2020       Dear Dr. Aviva Martinez:    Thank you for referring Catalino Mcfadden to me for evaluation. Attached you will find relevant portions of my assessment and plan of care.    If you have questions, please do not hesitate to call me. I look forward to following Catalino Mcfadden along with you.    Sincerely,    Maryluo Shaver MD    Enclosure  CC:  No Recipients    If you would like to receive this communication electronically, please contact [email or web address] or [phone] to request more information on [employer] access.    For providers and/or their staff who would like to refer a patient to [facility], please contact us through our one-stop-shop provider referral line, [facility] , at [phone].    If you feel you have received this communication in error or would no longer like to receive these types of communications, please e-mail [email or web address]

## 2020-01-03 NOTE — PROGRESS NOTES
CARDIOVASCULAR CONSULTATION    REASON FOR CONSULT:   Catalino Mcfadden is a 54 y.o. male who presents for for evaluation of hypertension.      HISTORY OF PRESENT ILLNESS:     Patient is a pleasant 54-year-old man.  Has been referred to me because of elevated blood pressure.  Is being followed by Podiatry also for ulcers on his feet.  States that the ulcers have been healing fine.  Denies any chest pains at rest on exertion, orthopnea, PND, swelling of feet.  ABIs were checked and were within normal limits.  States that lately has been eating a lot of salt in his diet and has not been eating well.  States that he checks his blood pressure at home and generally stays around 150-160 mm systolic.      The right ankle brachial index was 1.16 which is normal.   The left ankle brachial index was 1.27 which is normal.   The right TBI is 0.79.   The left TBI is 0.68.     1. Right lower extremity pressures and waveforms indicate no hemodynamically significant arterial occlusive disease.  2. Left lower extremity pressures and waveforms indicate no hemodynamically significant arterial occlusive disease.      PAST MEDICAL HISTORY:     Past Medical History:   Diagnosis Date    Diabetes mellitus, type 2     Hyperlipidemia     Hypertension        PAST SURGICAL HISTORY:     Past Surgical History:   Procedure Laterality Date    CERVICAL DISC SURGERY  07/11/2016       ALLERGIES AND MEDICATION:     Review of patient's allergies indicates:   Allergen Reactions    Morphine Hallucinations        Medication List           Accurate as of January 3, 2020 10:40 AM. If you have any questions, ask your nurse or doctor.               CONTINUE taking these medications    atorvastatin 40 MG tablet  Commonly known as:  LIPITOR  Take 1 tablet (40 mg total) by mouth once daily.     blood sugar diagnostic Strp  1 strip by Misc.(Non-Drug; Combo Route) route 3 (three) times daily. Patient needs One Touch Test Strips (Berio).     chlorthalidone 25 MG  Tab  Commonly known as:  HYGROTEN  Take 1 tablet (25 mg total) by mouth once daily.     ciprofloxacin HCl 750 MG tablet  Commonly known as:  CIPRO  Take 1 tablet (750 mg total) by mouth once daily.     diltiaZEM 240 MG 24 hr capsule  Commonly known as:  Cartia XT  TAKE 1 CAPSULE(240 MG) BY MOUTH EVERY DAY     doxycycline 100 MG Cap  Commonly known as:  VIBRAMYCIN  Take 1 capsule (100 mg total) by mouth every 12 (twelve) hours.     dulaglutide 0.75 mg/0.5 mL Pnij  Commonly known as:  Trulicity  INJECT 0.5 ML UNDER THE SKIN EVERY 7 DAYS     HYDROcodone-acetaminophen 7.5-325 mg per tablet  Commonly known as:  NORCO     ibuprofen 800 MG tablet  Commonly known as:  ADVIL,MOTRIN     lisinopril 20 MG tablet  Commonly known as:  PRINIVIL,ZESTRIL  Take 2 tablets (40 mg total) by mouth once daily.            SOCIAL HISTORY:     Social History     Socioeconomic History    Marital status:      Spouse name: Not on file    Number of children: Not on file    Years of education: Not on file    Highest education level: Not on file   Occupational History    Not on file   Social Needs    Financial resource strain: Not on file    Food insecurity:     Worry: Not on file     Inability: Not on file    Transportation needs:     Medical: Not on file     Non-medical: Not on file   Tobacco Use    Smoking status: Never Smoker    Smokeless tobacco: Never Used   Substance and Sexual Activity    Alcohol use: Yes     Alcohol/week: 0.0 standard drinks     Comment: social    Drug use: No    Sexual activity: Not on file   Lifestyle    Physical activity:     Days per week: Not on file     Minutes per session: Not on file    Stress: Not on file   Relationships    Social connections:     Talks on phone: Not on file     Gets together: Not on file     Attends Muslim service: Not on file     Active member of club or organization: Not on file     Attends meetings of clubs or organizations: Not on file     Relationship status: Not  on file   Other Topics Concern    Not on file   Social History Narrative    Not on file       FAMILY HISTORY:   History reviewed. No pertinent family history.    REVIEW OF SYSTEMS:   Review of Systems   Constitution: Negative.   HENT: Negative.    Eyes: Negative.    Respiratory: Negative.    Endocrine: Negative.    Hematologic/Lymphatic: Negative.    Skin: Negative.    Musculoskeletal: Negative.    Gastrointestinal: Negative.    Genitourinary: Negative.    Neurological: Negative.    Psychiatric/Behavioral: Negative.    Allergic/Immunologic: Negative.        A 10 point review of systems was performed and all the pertinent positives have been mentioned. Rest of review of systems was negative.        PHYSICAL EXAM:     Vitals:    01/03/20 1024   BP: (!) 184/102   Pulse: 80   Resp: 16    Body mass index is 34.02 kg/m².  Weight: 117 kg (257 lb 13.6 oz)         Physical Exam   Constitutional: He is oriented to person, place, and time. He appears well-developed and well-nourished.   HENT:   Head: Normocephalic.   Eyes: Pupils are equal, round, and reactive to light. Conjunctivae are normal.   Neck: Normal range of motion. Neck supple.   Cardiovascular: Normal rate, regular rhythm and normal heart sounds.   Pulmonary/Chest: Effort normal and breath sounds normal.   Abdominal: Soft. Bowel sounds are normal.   Neurological: He is alert and oriented to person, place, and time.   Skin: Skin is warm.   Vitals reviewed.        DATA:     Laboratory:  CBC:  Recent Labs   Lab 12/13/19  1035 12/20/19  1056 12/27/19  0846   WBC 5.65 5.81 5.98   Hemoglobin 14.0 14.2 13.6 L   Hematocrit 44.8 44.9 43.0   Platelets 229 237 205       CHEMISTRIES:  Recent Labs   Lab 12/13/19  1035 12/20/19  1056 12/27/19  0846   Glucose 158 H 181 H 231 H   Sodium 135 L 136 134 L   Potassium 4.7 4.4 4.9   BUN, Bld 44 H 36 H 40 H   Creatinine 2.7 H 2.5 H 2.7 H   eGFR if  29 A 32 A 29 A   eGFR if non  25 A 28 A 25 A   Calcium  9.4 9.3 9.4       CARDIAC BIOMARKERS:        COAGS:        LIPIDS/LFTS:  Recent Labs   Lab 02/12/18  1209  05/27/19  0850 08/28/19  1111  12/13/19  1035 12/20/19  1056 12/27/19  0846   Cholesterol 251 H  --  230 H 173  --   --   --   --    Triglycerides 132  --  113 92  --   --   --   --    HDL 42  --  49 45  --   --   --   --    LDL Cholesterol 182.6 H  --  158.4 109.6  --   --   --   --    Non-HDL Cholesterol 209  --  181 128  --   --   --   --    AST  --    < > 37 32   < > 61 H 46 H 42 H   ALT  --    < > 43 41   < > 62 H 64 H 51 H    < > = values in this interval not displayed.       Hemoglobin A1C   Date Value Ref Range Status   08/28/2019 8.9 (H) 4.0 - 5.6 % Final     Comment:     ADA Screening Guidelines:  5.7-6.4%  Consistent with prediabetes  >or=6.5%  Consistent with diabetes  High levels of fetal hemoglobin interfere with the HbA1C  assay. Heterozygous hemoglobin variants (HbS, HgC, etc)do  not significantly interfere with this assay.   However, presence of multiple variants may affect accuracy.     05/27/2019 8.5 (H) 4.0 - 5.6 % Final     Comment:     ADA Screening Guidelines:  5.7-6.4%  Consistent with prediabetes  >or=6.5%  Consistent with diabetes  High levels of fetal hemoglobin interfere with the HbA1C  assay. Heterozygous hemoglobin variants (HbS, HgC, etc)do  not significantly interfere with this assay.   However, presence of multiple variants may affect accuracy.     05/10/2018 6.6 (H) 4.0 - 5.6 % Final     Comment:     According to ADA guidelines, hemoglobin A1c <7.0% represents  optimal control in non-pregnant diabetic patients. Different  metrics may apply to specific patient populations.   Standards of Medical Care in Diabetes-2016.  For the purpose of screening for the presence of diabetes:  <5.7%     Consistent with the absence of diabetes  5.7-6.4%  Consistent with increasing risk for diabetes   (prediabetes)  >or=6.5%  Consistent with diabetes  Currently, no consensus exists for use of  hemoglobin A1c  for diagnosis of diabetes for children.  This Hemoglobin A1c assay has significant interference with fetal   hemoglobin   (HbF). The results are invalid for patients with abnormal amounts of   HbF,   including those with known Hereditary Persistence   of Fetal Hemoglobin. Heterozygous hemoglobin variants (HbAS, HbAC,   HbAD, HbAE, HbA2) do not significantly interfere with this assay;   however, presence of multiple variants in a sample may impact the %   interference.         TSH  Recent Labs   Lab 06/07/19  1447   TSH 0.966       The 10-year ASCVD risk score (Patfabrice ZAMARRIPA Jr., et al., 2013) is: 35.5%    Values used to calculate the score:      Age: 54 years      Sex: Male      Is Non- : Yes      Diabetic: Yes      Tobacco smoker: No      Systolic Blood Pressure: 184 mmHg      Is BP treated: Yes      HDL Cholesterol: 45 mg/dL      Total Cholesterol: 173 mg/dL             ASSESSMENT AND PLAN     Patient Active Problem List   Diagnosis    Uncontrolled type 2 diabetes mellitus with stage 3 chronic kidney disease, without long-term current use of insulin    Essential hypertension    Hyperlipidemia, acquired    ED (erectile dysfunction)    CKD (chronic kidney disease), stage III    History of diabetic ulcer of foot    Osteomyelitis of second toe of left foot    Type 2 diabetes mellitus with foot ulcer, without long-term current use of insulin    Long term (current) use of antibiotics       1.  Uncontrolled hypertension:  Patient with uncontrolled hypertension.  Importance of dietary compliance was discussed in detail with the patient.  Low-salt diet was discussed in detail with the patient.  I have asked the patient to stop maintaining a blood pressure log at home, because the patient states that he thinks his blood pressure is higher when he goes to doctor's office as compared to when he is at home.  However at home also it is elevated.  I will increase his Cardizem to 360 mg  daily.  He is under the care of a nephrologist also and recently his lisinopril was increased per nephrologist to 40 mg daily.   I will also check a echocardiogram    Follow-up after echocardiogram and in 2-3 weeks to see the effect of Cardizem on his blood pressure.  If blood pressure continues to be elevated, we will add other medications.          Thank you very much for involving me in the care of your patient.  Please do not hesitate to contact me if there are any questions.      Marylou Shaver MD, FAC, [facility]  Interventional Cardiologist, [facility].           This note was dictated with the help of speech recognition software.  There might be un-intended errors and/or substitutions.

## 2020-01-09 ENCOUNTER — TELEPHONE (OUTPATIENT)
Dept: INFECTIOUS DISEASES | Facility: CLINIC | Age: 55
End: 2020-01-09

## 2020-01-09 DIAGNOSIS — R89.9 ABNORMAL LABORATORY TEST: Primary | ICD-10-CM

## 2020-01-09 NOTE — TELEPHONE ENCOUNTER
----- Message from Aviva Martinez DPM sent at 1/9/2020  8:50 AM CST -----  Hey    He is healed!    ----- Message -----  From: DANUTA Salamanca, ANP  Sent: 1/8/2020   7:34 PM CST  To: Aviva Martinez DPM    Hi there!  Did you see him last Friday?  How did he look?   ----- Message -----  From: Aviva Martinez DPM  Sent: 12/30/2019  10:16 PM CST  To: DANUTA Salamanca, ANP    Hey     I was off last week so I did not see him, I will see him Friday and let you know    ----- Message -----  From: DANUTA Salamanca, ANP  Sent: 12/30/2019   6:20 PM CST  To: Aviva Martinez DPM    Hi there!  You are seeing Mr. Mcfadden on Friday.  Looks like his pathology was negative on that second bone biopsy, wound.  If wound looks ok, I think we should stop antibiotics.   Let me know!   Thanks.

## 2020-01-10 ENCOUNTER — TELEPHONE (OUTPATIENT)
Dept: INFECTIOUS DISEASES | Facility: CLINIC | Age: 55
End: 2020-01-10

## 2020-01-10 ENCOUNTER — HOSPITAL ENCOUNTER (OUTPATIENT)
Dept: WOUND CARE | Facility: HOSPITAL | Age: 55
Discharge: HOME | End: 2020-01-10
Attending: PODIATRIST
Payer: COMMERCIAL

## 2020-01-10 DIAGNOSIS — M20.12 HALLUX ABDUCTO VALGUS, LEFT: ICD-10-CM

## 2020-01-10 DIAGNOSIS — M86.9 OSTEOMYELITIS OF SECOND TOE OF LEFT FOOT: ICD-10-CM

## 2020-01-10 DIAGNOSIS — M20.11 HALLUX ABDUCTO VALGUS, RIGHT: ICD-10-CM

## 2020-01-10 DIAGNOSIS — M20.5X2 HALLUX LIMITUS, ACQUIRED, LEFT: ICD-10-CM

## 2020-01-10 DIAGNOSIS — M20.5X1 HALLUX LIMITUS, ACQUIRED, RIGHT: ICD-10-CM

## 2020-01-10 DIAGNOSIS — M20.41 HAMMER TOES OF BOTH FEET: ICD-10-CM

## 2020-01-10 DIAGNOSIS — M20.42 HAMMER TOES OF BOTH FEET: ICD-10-CM

## 2020-01-10 DIAGNOSIS — E11.621 TYPE 2 DIABETES MELLITUS WITH FOOT ULCER, WITHOUT LONG-TERM CURRENT USE OF INSULIN: Primary | ICD-10-CM

## 2020-01-10 DIAGNOSIS — L90.9 PLANTAR FAT PAD ATROPHY: ICD-10-CM

## 2020-01-10 DIAGNOSIS — Z86.31 HISTORY OF DIABETIC ULCER OF FOOT: ICD-10-CM

## 2020-01-10 DIAGNOSIS — M86.9 OSTEOMYELITIS OF SECOND TOE OF RIGHT FOOT: ICD-10-CM

## 2020-01-10 DIAGNOSIS — L97.509 TYPE 2 DIABETES MELLITUS WITH FOOT ULCER, WITHOUT LONG-TERM CURRENT USE OF INSULIN: Primary | ICD-10-CM

## 2020-01-10 PROCEDURE — 99214 PR OFFICE/OUTPT VISIT, EST, LEVL IV, 30-39 MIN: ICD-10-PCS | Mod: ,,, | Performed by: PODIATRIST

## 2020-01-10 PROCEDURE — 99212 OFFICE O/P EST SF 10 MIN: CPT | Performed by: PODIATRIST

## 2020-01-10 PROCEDURE — 99214 OFFICE O/P EST MOD 30 MIN: CPT | Mod: ,,, | Performed by: PODIATRIST

## 2020-01-10 NOTE — TELEPHONE ENCOUNTER
----- Message from DANUTA Salamanca, ANP sent at 1/9/2020 11:50 PM CST -----  Denise:  Please call Mr. Mcfadden.  Tell him I have been in touch with Dr. Martinez and she tells me his wounds have healed.  He should stop the antibiotics.  Tell him I would like him to repeat a blood chemistry in two weeks as his liver enzymes had been very mildly elevated while on antibiotics and I want to ensure they are back to normal.  I will put in the order and he can go in two weeks at his convenience.  Don't know if he is following up with Dr. Martinez over the next few weeks.  If so, he can go when he sees her.  Also tell him to call us if any problems/concerns.  Thanks

## 2020-01-11 ENCOUNTER — PATIENT MESSAGE (OUTPATIENT)
Dept: INFECTIOUS DISEASES | Facility: CLINIC | Age: 55
End: 2020-01-11

## 2020-01-12 ENCOUNTER — HOSPITAL ENCOUNTER (EMERGENCY)
Facility: HOSPITAL | Age: 55
Discharge: HOME | End: 2020-01-12
Attending: EMERGENCY MEDICINE
Payer: COMMERCIAL

## 2020-01-12 VITALS
HEART RATE: 74 BPM | TEMPERATURE: 98 F | SYSTOLIC BLOOD PRESSURE: 179 MMHG | WEIGHT: 248 LBS | HEIGHT: 73 IN | BODY MASS INDEX: 32.87 KG/M2 | RESPIRATION RATE: 18 BRPM | DIASTOLIC BLOOD PRESSURE: 95 MMHG | OXYGEN SATURATION: 98 %

## 2020-01-12 DIAGNOSIS — T14.8XXA BLOOD BLISTER: Primary | ICD-10-CM

## 2020-01-12 PROCEDURE — 99282 EMERGENCY DEPT VISIT SF MDM: CPT

## 2020-01-12 NOTE — ED TRIAGE NOTES
Pt arrived to the ED due to a wound on the bottom on his left foot that occurred earlier this morning. Pt reports bleeding from wound.

## 2020-01-12 NOTE — ED PROVIDER NOTES
"Encounter Date: 1/12/2020    SCRIBE #1 NOTE: I, Heidi Duran, am scribing for, and in the presence of,  Francisco Michelle MD. I have scribed the entire note.       History     Chief Complaint   Patient presents with    Wound Check     pt reports bone infection to L foot and is now healed. today states bleeding noted this morning after flaky skin was peeled off. pt currently on antibx. hx of HTN and DM.     CC: Wound check    HPI:  This is a 54 y.o. male with a PMHx of Hypertension and Diabetes Mellitus who presents to the Emergency Department for a wound check status post bone infection to left foot three months ago. He states "my foot has been wrapped up for three months". The patient denies headache, chest pain, back pain, shortness of breath, visual disturbance, or any other associated symptoms. The patient states that last night while he was putting lotion on his foot, he felt flaky skin and peeled it off. This morning when he woke up there was blood on his sock from the area where he peeled his skin. The patient reports being released from the hospital two days ago related to bone infection. He is currently taking antibiotics.       The history is provided by the patient.     Review of patient's allergies indicates:   Allergen Reactions    Morphine Hallucinations     Past Medical History:   Diagnosis Date    Diabetes mellitus, type 2     Hyperlipidemia     Hypertension      Past Surgical History:   Procedure Laterality Date    CERVICAL DISC SURGERY  07/11/2016     No family history on file.  Social History     Tobacco Use    Smoking status: Never Smoker    Smokeless tobacco: Never Used   Substance Use Topics    Alcohol use: Yes     Alcohol/week: 0.0 standard drinks     Comment: social    Drug use: No     Review of Systems   Constitutional: Negative for fever.   HENT: Negative for ear pain and sore throat.    Eyes: Negative for visual disturbance.   Respiratory: Negative for shortness of breath.  "   Cardiovascular: Negative for chest pain.   Gastrointestinal: Negative for abdominal pain and nausea.   Genitourinary: Negative for dysuria.   Musculoskeletal: Negative for back pain.   Skin: Negative for rash.        Flaky skin with blood to left foot. No active bleeding.   Neurological: Negative for weakness and headaches.   Hematological: Does not bruise/bleed easily.       Physical Exam     Initial Vitals [01/12/20 1043]   BP Pulse Resp Temp SpO2   (!) 172/98 87 18 98.2 °F (36.8 °C) 100 %      MAP       --         Physical Exam    Nursing note and vitals reviewed.  Constitutional: He appears well-developed and well-nourished. He is not diaphoretic. No distress.   HENT:   Head: Normocephalic and atraumatic.   Right Ear: External ear normal.   Left Ear: External ear normal.   Nose: Nose normal.   Eyes: Conjunctivae and EOM are normal.   Neck: Neck supple.   Cardiovascular: Normal rate.   Pulmonary/Chest: Breath sounds normal. No stridor. No respiratory distress. He has no wheezes.   Musculoskeletal:        Cervical back: Normal.        Thoracic back: Normal.        Lumbar back: Normal.        Left foot: Comments: Normal pulse. Good capillary refill. Postsurgical changes to left second toe without evidence of acute infection or wound. Significant hallux valgus deformity. Evidence of charcot foot deformity. 2 cm blood blister to the distal lateral plantar left foot. No active bleeding. No evidence of infection. Callus overlying the blood blister.    Neurological: He is alert and oriented to person, place, and time. No cranial nerve deficit.   Skin: Skin is warm and dry. No pallor.   Psychiatric: He has a normal mood and affect. Thought content normal.         ED Course   Procedures  Labs Reviewed - No data to display       Imaging Results    None          Medical Decision Making:   History:   Old Medical Records: I decided to obtain old medical records.  Initial Assessment:   This is a 54 y.o. male with a PMHx of  Hypertension and Diabetes Mellitus who presents to the Emergency Department for a wound check status post bone infection to left foot three months ago. Will order wound care routine, dressing change, and nurse communication, and reassess.   Differential Diagnosis:   My differential diagnosis includes  Minor trauma to the foot with no evidence of infection  Diabetic with Peripheral neuropathy            Scribe Attestation:   Scribe #1: I performed the above scribed service and the documentation accurately describes the services I performed. I attest to the accuracy of the note.    No evidence of FB or infection. Wound care and podiatry follow up.                       Clinical Impression:       ICD-10-CM ICD-9-CM   1. Blood blister T14.8XXA 919.2            Scribe attestation: I, Francisco Michelle, personally performed the services described in this documentation. All medical record entries made by the scribe were at my direction and in my presence. I have reviewed the chart and agree that the record reflects my personal performance and is accurate and complete                     Francisco Michelle MD  01/23/20 0686

## 2020-01-13 ENCOUNTER — PATIENT MESSAGE (OUTPATIENT)
Dept: PODIATRY | Facility: CLINIC | Age: 55
End: 2020-01-13

## 2020-01-13 ENCOUNTER — TELEPHONE (OUTPATIENT)
Dept: INFECTIOUS DISEASES | Facility: CLINIC | Age: 55
End: 2020-01-13

## 2020-01-13 ENCOUNTER — OFFICE VISIT (OUTPATIENT)
Dept: PODIATRY | Facility: CLINIC | Age: 55
End: 2020-01-13
Payer: COMMERCIAL

## 2020-01-13 VITALS
BODY MASS INDEX: 32.87 KG/M2 | DIASTOLIC BLOOD PRESSURE: 95 MMHG | HEIGHT: 73 IN | HEART RATE: 81 BPM | WEIGHT: 248 LBS | SYSTOLIC BLOOD PRESSURE: 169 MMHG

## 2020-01-13 DIAGNOSIS — E08.621 DIABETIC ULCER OF LEFT MIDFOOT ASSOCIATED WITH DIABETES MELLITUS DUE TO UNDERLYING CONDITION, WITH FAT LAYER EXPOSED: ICD-10-CM

## 2020-01-13 DIAGNOSIS — E11.49 TYPE II DIABETES MELLITUS WITH NEUROLOGICAL MANIFESTATIONS: Primary | ICD-10-CM

## 2020-01-13 DIAGNOSIS — L97.422 DIABETIC ULCER OF LEFT MIDFOOT ASSOCIATED WITH DIABETES MELLITUS DUE TO UNDERLYING CONDITION, WITH FAT LAYER EXPOSED: ICD-10-CM

## 2020-01-13 PROCEDURE — 3080F DIAST BP >= 90 MM HG: CPT | Mod: CPTII,S$GLB,, | Performed by: PODIATRIST

## 2020-01-13 PROCEDURE — 99213 PR OFFICE/OUTPT VISIT, EST, LEVL III, 20-29 MIN: ICD-10-PCS | Mod: S$GLB,,, | Performed by: PODIATRIST

## 2020-01-13 PROCEDURE — 3080F PR MOST RECENT DIASTOLIC BLOOD PRESSURE >= 90 MM HG: ICD-10-PCS | Mod: CPTII,S$GLB,, | Performed by: PODIATRIST

## 2020-01-13 PROCEDURE — 3008F PR BODY MASS INDEX (BMI) DOCUMENTED: ICD-10-PCS | Mod: CPTII,S$GLB,, | Performed by: PODIATRIST

## 2020-01-13 PROCEDURE — 3008F BODY MASS INDEX DOCD: CPT | Mod: CPTII,S$GLB,, | Performed by: PODIATRIST

## 2020-01-13 PROCEDURE — 3077F SYST BP >= 140 MM HG: CPT | Mod: CPTII,S$GLB,, | Performed by: PODIATRIST

## 2020-01-13 PROCEDURE — 3077F PR MOST RECENT SYSTOLIC BLOOD PRESSURE >= 140 MM HG: ICD-10-PCS | Mod: CPTII,S$GLB,, | Performed by: PODIATRIST

## 2020-01-13 PROCEDURE — 99999 PR PBB SHADOW E&M-EST. PATIENT-LVL III: ICD-10-PCS | Mod: PBBFAC,,, | Performed by: PODIATRIST

## 2020-01-13 PROCEDURE — 99999 PR PBB SHADOW E&M-EST. PATIENT-LVL III: CPT | Mod: PBBFAC,,, | Performed by: PODIATRIST

## 2020-01-13 PROCEDURE — 99213 OFFICE O/P EST LOW 20 MIN: CPT | Mod: S$GLB,,, | Performed by: PODIATRIST

## 2020-01-13 NOTE — TELEPHONE ENCOUNTER
Called patient and informed him vignesh coe and  have been communication and are informed wounds are healed. Informed him to stop abx per vignesh coe and to have labs done in 2 weeks. Patient agreed to lab appointment date/time/location. Informed if need anything else give us a call. Patient understood.
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
## 2020-01-15 ENCOUNTER — TELEPHONE (OUTPATIENT)
Dept: FAMILY MEDICINE | Facility: CLINIC | Age: 55
End: 2020-01-15

## 2020-01-15 NOTE — TELEPHONE ENCOUNTER
----- Message from Rhea Su sent at 1/15/2020  2:42 PM CST -----  Contact: Self   Type: Patient Call Back    Who called: Self     What is the request in detail:  Patient is asking to speak with the office   Can the clinic reply by [facility]?  Call   Would the patient rather a call back or a response via [facility]?  Call   Best call back number: [phone]    Additional Information:

## 2020-01-15 NOTE — TELEPHONE ENCOUNTER
Pt stated that his BP has been high for a few days, but he did not want the first available appts I offered. He stated he will just address the issue when he go for his appt with  on Friday.

## 2020-01-17 ENCOUNTER — HOSPITAL ENCOUNTER (OUTPATIENT)
Dept: CARDIOLOGY | Facility: HOSPITAL | Age: 55
Discharge: HOME | End: 2020-01-17
Attending: INTERNAL MEDICINE
Payer: COMMERCIAL

## 2020-01-17 ENCOUNTER — TELEPHONE (OUTPATIENT)
Dept: CARDIOLOGY | Facility: CLINIC | Age: 55
End: 2020-01-17

## 2020-01-17 VITALS — BODY MASS INDEX: 32.87 KG/M2 | HEIGHT: 73 IN | WEIGHT: 248 LBS

## 2020-01-17 DIAGNOSIS — I10 ESSENTIAL HYPERTENSION: ICD-10-CM

## 2020-01-17 LAB
AORTIC ROOT ANNULUS: 3.58 CM
AORTIC VALVE CUSP SEPERATION: 2.43 CM
ASCENDING AORTA: 3.09 CM
AV INDEX (PROSTH): 0.79
AV MEAN GRADIENT: 4 MMHG
AV PEAK GRADIENT: 6 MMHG
AV VALVE AREA: 3.21 CM2
AV VELOCITY RATIO: 0.79
BSA FOR ECHO PROCEDURE: 2.41 M2
CV ECHO LV RWT: 0.68 CM
DOP CALC AO PEAK VEL: 1.18 M/S
DOP CALC AO VTI: 30.72 CM
DOP CALC LVOT AREA: 4 CM2
DOP CALC LVOT DIAMETER: 2.27 CM
DOP CALC LVOT PEAK VEL: 0.93 M/S
DOP CALC LVOT STROKE VOLUME: 98.7 CM3
DOP CALCLVOT PEAK VEL VTI: 24.4 CM
E WAVE DECELERATION TIME: 191.38 MSEC
E/A RATIO: 0.81
E/E' RATIO: 8.63 M/S
ECHO LV POSTERIOR WALL: 1.39 CM (ref 0.6–1.1)
FRACTIONAL SHORTENING: 32 % (ref 28–44)
INTERVENTRICULAR SEPTUM: 1.31 CM (ref 0.6–1.1)
IVRT: 0.14 MSEC
LA MAJOR: 5.56 CM
LA MINOR: 5.26 CM
LA WIDTH: 4.07 CM
LEFT ATRIUM SIZE: 4.06 CM
LEFT ATRIUM VOLUME INDEX: 32.2 ML/M2
LEFT ATRIUM VOLUME: 75.93 CM3
LEFT INTERNAL DIMENSION IN SYSTOLE: 2.77 CM (ref 2.1–4)
LEFT VENTRICLE DIASTOLIC VOLUME INDEX: 31.39 ML/M2
LEFT VENTRICLE DIASTOLIC VOLUME: 74.01 ML
LEFT VENTRICLE MASS INDEX: 87 G/M2
LEFT VENTRICLE SYSTOLIC VOLUME INDEX: 12.2 ML/M2
LEFT VENTRICLE SYSTOLIC VOLUME: 28.71 ML
LEFT VENTRICULAR INTERNAL DIMENSION IN DIASTOLE: 4.09 CM (ref 3.5–6)
LEFT VENTRICULAR MASS: 204.13 G
LV LATERAL E/E' RATIO: 6.9 M/S
LV SEPTAL E/E' RATIO: 11.5 M/S
MV PEAK A VEL: 0.85 M/S
MV PEAK E VEL: 0.69 M/S
PISA TR MAX VEL: 2.39 M/S
PV PEAK VELOCITY: 1.09 CM/S
RA MAJOR: 5.93 CM
RA WIDTH: 2.99 CM
RIGHT VENTRICULAR END-DIASTOLIC DIMENSION: 3.27 CM
RV TISSUE DOPPLER FREE WALL SYSTOLIC VELOCITY 1 (APICAL 4 CHAMBER VIEW): 12.31 CM/S
SINUS: 3.29 CM
STJ: 2.73 CM
TDI LATERAL: 0.1 M/S
TDI SEPTAL: 0.06 M/S
TDI: 0.08 M/S
TR MAX PG: 23 MMHG
TRICUSPID ANNULAR PLANE SYSTOLIC EXCURSION: 1.98 CM

## 2020-01-17 PROCEDURE — 93308 TTE F-UP OR LMTD: CPT | Mod: 26,,, | Performed by: INTERNAL MEDICINE

## 2020-01-17 PROCEDURE — 93308 ECHO (CUPID ONLY): ICD-10-PCS | Mod: 26,,, | Performed by: INTERNAL MEDICINE

## 2020-01-17 PROCEDURE — 93306 TTE W/DOPPLER COMPLETE: CPT

## 2020-01-17 NOTE — TELEPHONE ENCOUNTER
Patient called in with concerns about his blood pressure. Patient states that he has been taking Cardizem as directed but when he checks his blood pressure in the mornings he's been getting numbers like 160/101. He also states he has been getting dizzy throughout the day after he has taken the Cardizem but admits to not being able to check his blood pressure at the times he's getting dizzy do to not having the cuff with him. Please advise.

## 2020-01-17 NOTE — TELEPHONE ENCOUNTER
Patient was offered the next available appointment of 1/17/20 and then 1/20/20 patient declined, stating he has an appointment on 1/29 that his will keep.

## 2020-01-24 ENCOUNTER — PATIENT OUTREACH (OUTPATIENT)
Dept: ADMINISTRATIVE | Facility: OTHER | Age: 55
End: 2020-01-24

## 2020-01-24 NOTE — PROGRESS NOTES
Chart reviewed.   Immunizations: updated  Orders placed: eyecam and hgba1c orders already placed.  Upcoming appts: n/a

## 2020-01-27 ENCOUNTER — LAB VISIT (OUTPATIENT)
Dept: LAB | Facility: HOSPITAL | Age: 55
End: 2020-01-27
Attending: NURSE PRACTITIONER
Payer: COMMERCIAL

## 2020-01-27 ENCOUNTER — OFFICE VISIT (OUTPATIENT)
Dept: PODIATRY | Facility: CLINIC | Age: 55
End: 2020-01-27
Payer: COMMERCIAL

## 2020-01-27 VITALS
WEIGHT: 248 LBS | DIASTOLIC BLOOD PRESSURE: 96 MMHG | HEART RATE: 78 BPM | SYSTOLIC BLOOD PRESSURE: 177 MMHG | BODY MASS INDEX: 32.87 KG/M2 | HEIGHT: 73 IN

## 2020-01-27 DIAGNOSIS — M20.5X2 HALLUX LIMITUS, ACQUIRED, LEFT: ICD-10-CM

## 2020-01-27 DIAGNOSIS — N18.30 CKD (CHRONIC KIDNEY DISEASE), STAGE III: ICD-10-CM

## 2020-01-27 DIAGNOSIS — M20.42 HAMMER TOES OF BOTH FEET: ICD-10-CM

## 2020-01-27 DIAGNOSIS — M21.6X1 PLANTARFLEXION DEFORMITY OF RIGHT FOOT: ICD-10-CM

## 2020-01-27 DIAGNOSIS — L90.9 PLANTAR FAT PAD ATROPHY: ICD-10-CM

## 2020-01-27 DIAGNOSIS — E11.49 TYPE II DIABETES MELLITUS WITH NEUROLOGICAL MANIFESTATIONS: Primary | ICD-10-CM

## 2020-01-27 DIAGNOSIS — M20.41 HAMMER TOES OF BOTH FEET: ICD-10-CM

## 2020-01-27 DIAGNOSIS — Z86.31 HISTORY OF DIABETIC ULCER OF FOOT: ICD-10-CM

## 2020-01-27 DIAGNOSIS — R89.9 ABNORMAL LABORATORY TEST: ICD-10-CM

## 2020-01-27 DIAGNOSIS — M20.5X1 HALLUX LIMITUS, ACQUIRED, RIGHT: ICD-10-CM

## 2020-01-27 LAB
ALBUMIN SERPL BCP-MCNC: 3 G/DL (ref 3.5–5.2)
ALP SERPL-CCNC: 260 U/L (ref 55–135)
ALT SERPL W/O P-5'-P-CCNC: 36 U/L (ref 10–44)
ANION GAP SERPL CALC-SCNC: 6 MMOL/L (ref 8–16)
ANION GAP SERPL CALC-SCNC: 6 MMOL/L (ref 8–16)
AST SERPL-CCNC: 34 U/L (ref 10–40)
BILIRUB SERPL-MCNC: 0.4 MG/DL (ref 0.1–1)
BUN SERPL-MCNC: 48 MG/DL (ref 6–20)
BUN SERPL-MCNC: 48 MG/DL (ref 6–20)
CALCIUM SERPL-MCNC: 9 MG/DL (ref 8.7–10.5)
CALCIUM SERPL-MCNC: 9 MG/DL (ref 8.7–10.5)
CHLORIDE SERPL-SCNC: 103 MMOL/L (ref 95–110)
CHLORIDE SERPL-SCNC: 103 MMOL/L (ref 95–110)
CO2 SERPL-SCNC: 26 MMOL/L (ref 23–29)
CO2 SERPL-SCNC: 26 MMOL/L (ref 23–29)
CREAT SERPL-MCNC: 3.1 MG/DL (ref 0.5–1.4)
CREAT SERPL-MCNC: 3.1 MG/DL (ref 0.5–1.4)
EST. GFR  (AFRICAN AMERICAN): 24.8 ML/MIN/1.73 M^2
EST. GFR  (AFRICAN AMERICAN): 24.8 ML/MIN/1.73 M^2
EST. GFR  (NON AFRICAN AMERICAN): 21.5 ML/MIN/1.73 M^2
EST. GFR  (NON AFRICAN AMERICAN): 21.5 ML/MIN/1.73 M^2
ESTIMATED AVG GLUCOSE: 194 MG/DL (ref 68–131)
GLUCOSE SERPL-MCNC: 204 MG/DL (ref 70–110)
GLUCOSE SERPL-MCNC: 204 MG/DL (ref 70–110)
HBA1C MFR BLD HPLC: 8.4 % (ref 4–5.6)
POTASSIUM SERPL-SCNC: 4.8 MMOL/L (ref 3.5–5.1)
POTASSIUM SERPL-SCNC: 4.8 MMOL/L (ref 3.5–5.1)
PROT SERPL-MCNC: 7.2 G/DL (ref 6–8.4)
SODIUM SERPL-SCNC: 135 MMOL/L (ref 136–145)
SODIUM SERPL-SCNC: 135 MMOL/L (ref 136–145)

## 2020-01-27 PROCEDURE — 99213 OFFICE O/P EST LOW 20 MIN: CPT | Mod: S$GLB,,, | Performed by: PODIATRIST

## 2020-01-27 PROCEDURE — 80053 COMPREHEN METABOLIC PANEL: CPT

## 2020-01-27 PROCEDURE — 3077F PR MOST RECENT SYSTOLIC BLOOD PRESSURE >= 140 MM HG: ICD-10-PCS | Mod: CPTII,S$GLB,, | Performed by: PODIATRIST

## 2020-01-27 PROCEDURE — 99213 PR OFFICE/OUTPT VISIT, EST, LEVL III, 20-29 MIN: ICD-10-PCS | Mod: S$GLB,,, | Performed by: PODIATRIST

## 2020-01-27 PROCEDURE — 3008F PR BODY MASS INDEX (BMI) DOCUMENTED: ICD-10-PCS | Mod: CPTII,S$GLB,, | Performed by: PODIATRIST

## 2020-01-27 PROCEDURE — 99999 PR PBB SHADOW E&M-EST. PATIENT-LVL III: CPT | Mod: PBBFAC,,, | Performed by: PODIATRIST

## 2020-01-27 PROCEDURE — 3080F DIAST BP >= 90 MM HG: CPT | Mod: CPTII,S$GLB,, | Performed by: PODIATRIST

## 2020-01-27 PROCEDURE — 3008F BODY MASS INDEX DOCD: CPT | Mod: CPTII,S$GLB,, | Performed by: PODIATRIST

## 2020-01-27 PROCEDURE — 83036 HEMOGLOBIN GLYCOSYLATED A1C: CPT

## 2020-01-27 PROCEDURE — 99999 PR PBB SHADOW E&M-EST. PATIENT-LVL III: ICD-10-PCS | Mod: PBBFAC,,, | Performed by: PODIATRIST

## 2020-01-27 PROCEDURE — 3077F SYST BP >= 140 MM HG: CPT | Mod: CPTII,S$GLB,, | Performed by: PODIATRIST

## 2020-01-27 PROCEDURE — 36415 COLL VENOUS BLD VENIPUNCTURE: CPT | Mod: PO

## 2020-01-27 PROCEDURE — 3080F PR MOST RECENT DIASTOLIC BLOOD PRESSURE >= 90 MM HG: ICD-10-PCS | Mod: CPTII,S$GLB,, | Performed by: PODIATRIST

## 2020-01-28 ENCOUNTER — TELEPHONE (OUTPATIENT)
Dept: INFECTIOUS DISEASES | Facility: HOSPITAL | Age: 55
End: 2020-01-28
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
## 2020-01-28 NOTE — TELEPHONE ENCOUNTER
ID labs follow up:  AST/ALT had been mildly elevated while on abx.   Repeated after off abx and wnl.   Creatinine uptrending.   Attempted to call patient with results.  No answer, no voicemail.   Will try again. Encourage hydration and follow up with Nephrology as scheduled.     2/5/20   Spoke to patient regarding liver enzymes.   Encouraged close follow up with Dr. Shaver for blood pressure control.   He has repeat labs ordered to monitor kidney function in 2 weeks and has follow up with Nephrologist in March.   Wounds healed. Follow up with Podiatry and with ID as needed.

## 2020-01-30 DIAGNOSIS — E11.65 UNCONTROLLED TYPE 2 DIABETES MELLITUS WITH HYPERGLYCEMIA: Primary | ICD-10-CM

## 2020-01-31 ENCOUNTER — PES CALL (OUTPATIENT)
Dept: ADMINISTRATIVE | Facility: CLINIC | Age: 55
End: 2020-01-31

## 2020-01-31 ENCOUNTER — PATIENT OUTREACH (OUTPATIENT)
Dept: ADMINISTRATIVE | Facility: OTHER | Age: 55
End: 2020-01-31

## 2020-02-03 ENCOUNTER — OFFICE VISIT (OUTPATIENT)
Dept: CARDIOLOGY | Facility: CLINIC | Age: 55
End: 2020-02-03
Payer: COMMERCIAL

## 2020-02-03 VITALS
BODY MASS INDEX: 32.75 KG/M2 | WEIGHT: 248.25 LBS | OXYGEN SATURATION: 98 % | DIASTOLIC BLOOD PRESSURE: 106 MMHG | RESPIRATION RATE: 16 BRPM | HEART RATE: 76 BPM | SYSTOLIC BLOOD PRESSURE: 190 MMHG

## 2020-02-03 DIAGNOSIS — I10 ESSENTIAL HYPERTENSION: Primary | ICD-10-CM

## 2020-02-03 PROCEDURE — 3077F SYST BP >= 140 MM HG: CPT | Mod: CPTII,S$GLB,, | Performed by: INTERNAL MEDICINE

## 2020-02-03 PROCEDURE — 3080F DIAST BP >= 90 MM HG: CPT | Mod: CPTII,S$GLB,, | Performed by: INTERNAL MEDICINE

## 2020-02-03 PROCEDURE — 3077F PR MOST RECENT SYSTOLIC BLOOD PRESSURE >= 140 MM HG: ICD-10-PCS | Mod: CPTII,S$GLB,, | Performed by: INTERNAL MEDICINE

## 2020-02-03 PROCEDURE — 99214 PR OFFICE/OUTPT VISIT, EST, LEVL IV, 30-39 MIN: ICD-10-PCS | Mod: S$GLB,,, | Performed by: INTERNAL MEDICINE

## 2020-02-03 PROCEDURE — 3080F PR MOST RECENT DIASTOLIC BLOOD PRESSURE >= 90 MM HG: ICD-10-PCS | Mod: CPTII,S$GLB,, | Performed by: INTERNAL MEDICINE

## 2020-02-03 PROCEDURE — 3008F PR BODY MASS INDEX (BMI) DOCUMENTED: ICD-10-PCS | Mod: CPTII,S$GLB,, | Performed by: INTERNAL MEDICINE

## 2020-02-03 PROCEDURE — 99214 OFFICE O/P EST MOD 30 MIN: CPT | Mod: S$GLB,,, | Performed by: INTERNAL MEDICINE

## 2020-02-03 PROCEDURE — 99999 PR PBB SHADOW E&M-EST. PATIENT-LVL III: ICD-10-PCS | Mod: PBBFAC,,, | Performed by: INTERNAL MEDICINE

## 2020-02-03 PROCEDURE — 3008F BODY MASS INDEX DOCD: CPT | Mod: CPTII,S$GLB,, | Performed by: INTERNAL MEDICINE

## 2020-02-03 PROCEDURE — 99999 PR PBB SHADOW E&M-EST. PATIENT-LVL III: CPT | Mod: PBBFAC,,, | Performed by: INTERNAL MEDICINE

## 2020-02-03 RX ORDER — AMLODIPINE BESYLATE 10 MG/1
10 TABLET ORAL DAILY
Qty: 90 TABLET | Refills: 3 | Status: SHIPPED | OUTPATIENT
Start: 2020-02-03 | End: 2021-01-06

## 2020-02-03 RX ORDER — HYDRALAZINE HYDROCHLORIDE 50 MG/1
50 TABLET, FILM COATED ORAL 3 TIMES DAILY
Qty: 90 TABLET | Refills: 3 | Status: SHIPPED | OUTPATIENT
Start: 2020-02-03 | End: 2020-02-03

## 2020-02-03 NOTE — PROGRESS NOTES
CARDIOVASCULAR CONSULTATION    REASON FOR CONSULT:   Catalino Mcfadden is a 54 y.o. male who presents for for evaluation of hypertension.      HISTORY OF PRESENT ILLNESS:     Patient is a pleasant 54-year-old man.  Has been referred to me because of elevated blood pressure.  Is being followed by Podiatry also for ulcers on his feet.  States that the ulcers have been healing fine.  Denies any chest pains at rest on exertion, orthopnea, PND, swelling of feet.  ABIs were checked and were within normal limits.  States that lately has been eating a lot of salt in his diet and has not been eating well.  States that he checks his blood pressure at home and generally stays around 150-160 mm systolic.      The right ankle brachial index was 1.16 which is normal.   The left ankle brachial index was 1.27 which is normal.   The right TBI is 0.79.   The left TBI is 0.68.     1. Right lower extremity pressures and waveforms indicate no hemodynamically significant arterial occlusive disease.  2. Left lower extremity pressures and waveforms indicate no hemodynamically significant arterial occlusive disease.      Notes from February 2020:  Patient here for follow-up.  Denies any chest pains at rest on exertion, orthopnea, PND.  Blood pressure elevated in clinic today.  States has been compliant with his medications.  Denies any chest pain at rest on exertion, PND, headaches.  States that when it is a pressure medication sometimes he feels a little dizzy after taking the medications.  States that occasionally checks his blood pressure at when he gets dizzy and usually it is around 30-1 40/90 mm mercury.  However does not check his blood pressure regularly.    PAST MEDICAL HISTORY:     Past Medical History:   Diagnosis Date    Diabetes mellitus, type 2     Hyperlipidemia     Hypertension        PAST SURGICAL HISTORY:     Past Surgical History:   Procedure Laterality Date    CERVICAL DISC SURGERY  07/11/2016       ALLERGIES AND  MEDICATION:     Review of patient's allergies indicates:   Allergen Reactions    Morphine Hallucinations        Medication List           Accurate as of February 3, 2020 10:29 AM. If you have any questions, ask your nurse or doctor.               START taking these medications    hydrALAZINE 50 MG tablet  Commonly known as:  APRESOLINE  Take 1 tablet (50 mg total) by mouth 3 (three) times daily.  Started by:  Marylou Shaver MD        CONTINUE taking these medications    atorvastatin 40 MG tablet  Commonly known as:  LIPITOR  Take 1 tablet (40 mg total) by mouth once daily.     blood sugar diagnostic Strp  1 strip by Misc.(Non-Drug; Combo Route) route 3 (three) times daily. Patient needs One Touch Test Strips (Berio).     chlorthalidone 25 MG Tab  Commonly known as:  HYGROTEN  Take 1 tablet (25 mg total) by mouth once daily.     diltiaZEM 360 MG 24 hr capsule  Commonly known as:  CARDIZEM CD  Take 1 capsule (360 mg total) by mouth once daily.     dulaglutide 0.75 mg/0.5 mL Pnij  Commonly known as:  Trulicity  INJECT 0.5 ML UNDER THE SKIN EVERY 7 DAYS     HYDROcodone-acetaminophen 7.5-325 mg per tablet  Commonly known as:  NORCO     ibuprofen 800 MG tablet  Commonly known as:  ADVIL,MOTRIN     lisinopril 20 MG tablet  Commonly known as:  PRINIVIL,ZESTRIL  Take 2 tablets (40 mg total) by mouth once daily.           Where to Get Your Medications      These medications were sent to [id number] - [place] [address] [place] [address], [place] [address]    Hours:  24-hours Phone:  [phone]   · hydrALAZINE 50 MG tablet         SOCIAL HISTORY:     Social History     Socioeconomic History    Marital status:      Spouse name: Not on file    Number of children: Not on file    Years of education: Not on file    Highest education level: Not on file   Occupational History    Not on file   Social Needs    Financial resource strain:  Not on file    Food insecurity:     Worry: Not on file     Inability: Not on file    Transportation needs:     Medical: Not on file     Non-medical: Not on file   Tobacco Use    Smoking status: Never Smoker    Smokeless tobacco: Never Used   Substance and Sexual Activity    Alcohol use: Yes     Alcohol/week: 0.0 standard drinks     Comment: social    Drug use: No    Sexual activity: Not on file   Lifestyle    Physical activity:     Days per week: Not on file     Minutes per session: Not on file    Stress: Not on file   Relationships    Social connections:     Talks on phone: Not on file     Gets together: Not on file     Attends Zoroastrianism service: Not on file     Active member of club or organization: Not on file     Attends meetings of clubs or organizations: Not on file     Relationship status: Not on file   Other Topics Concern    Not on file   Social History Narrative    Not on file       FAMILY HISTORY:   History reviewed. No pertinent family history.    REVIEW OF SYSTEMS:   Review of Systems   Constitution: Negative.   HENT: Negative.    Eyes: Negative.    Respiratory: Negative.    Endocrine: Negative.    Hematologic/Lymphatic: Negative.    Skin: Negative.    Musculoskeletal: Negative.    Gastrointestinal: Negative.    Genitourinary: Negative.    Neurological: Negative.    Psychiatric/Behavioral: Negative.    Allergic/Immunologic: Negative.        A 10 point review of systems was performed and all the pertinent positives have been mentioned. Rest of review of systems was negative.        PHYSICAL EXAM:     Vitals:    02/03/20 1030   BP: (!) 190/106   Pulse:    Resp:     Body mass index is 32.75 kg/m².  Weight: 112.6 kg (248 lb 3.8 oz)         Physical Exam   Constitutional: He is oriented to person, place, and time. He appears well-developed and well-nourished.   HENT:   Head: Normocephalic.   Eyes: Pupils are equal, round, and reactive to light. Conjunctivae are normal.   Neck: Normal range of  motion. Neck supple.   Cardiovascular: Normal rate, regular rhythm and normal heart sounds.   Pulmonary/Chest: Effort normal and breath sounds normal.   Abdominal: Soft. Bowel sounds are normal.   Neurological: He is alert and oriented to person, place, and time.   Skin: Skin is warm.   Vitals reviewed.        DATA:     Laboratory:  CBC:  Recent Labs   Lab 12/13/19  1035 12/20/19  1056 12/27/19  0846   WBC 5.65 5.81 5.98   Hemoglobin 14.0 14.2 13.6 L   Hematocrit 44.8 44.9 43.0   Platelets 229 237 205       CHEMISTRIES:  Recent Labs   Lab 12/20/19  1056 12/27/19  0846 01/27/20  0725   Glucose 181 H 231 H 204 H  204 H   Sodium 136 134 L 135 L  135 L   Potassium 4.4 4.9 4.8  4.8   BUN, Bld 36 H 40 H 48 H  48 H   Creatinine 2.5 H 2.7 H 3.1 H  3.1 H   eGFR if  32 A 29 A 24.8 A  24.8 A   eGFR if non  28 A 25 A 21.5 A  21.5 A   Calcium 9.3 9.4 9.0  9.0       CARDIAC BIOMARKERS:        COAGS:        LIPIDS/LFTS:  Recent Labs   Lab 02/12/18  1209  05/27/19  0850 08/28/19  1111  12/20/19  1056 12/27/19  0846 01/27/20  0725   Cholesterol 251 H  --  230 H 173  --   --   --   --    Triglycerides 132  --  113 92  --   --   --   --    HDL 42  --  49 45  --   --   --   --    LDL Cholesterol 182.6 H  --  158.4 109.6  --   --   --   --    Non-HDL Cholesterol 209  --  181 128  --   --   --   --    AST  --    < > 37 32   < > 46 H 42 H 34   ALT  --    < > 43 41   < > 64 H 51 H 36    < > = values in this interval not displayed.       Hemoglobin A1C   Date Value Ref Range Status   01/27/2020 8.4 (H) 4.0 - 5.6 % Final     Comment:     ADA Screening Guidelines:  5.7-6.4%  Consistent with prediabetes  >or=6.5%  Consistent with diabetes  High levels of fetal hemoglobin interfere with the HbA1C  assay. Heterozygous hemoglobin variants (HbS, HgC, etc)do  not significantly interfere with this assay.   However, presence of multiple variants may affect accuracy.     08/28/2019 8.9 (H) 4.0 - 5.6 % Final      Comment:     ADA Screening Guidelines:  5.7-6.4%  Consistent with prediabetes  >or=6.5%  Consistent with diabetes  High levels of fetal hemoglobin interfere with the HbA1C  assay. Heterozygous hemoglobin variants (HbS, HgC, etc)do  not significantly interfere with this assay.   However, presence of multiple variants may affect accuracy.     05/27/2019 8.5 (H) 4.0 - 5.6 % Final     Comment:     ADA Screening Guidelines:  5.7-6.4%  Consistent with prediabetes  >or=6.5%  Consistent with diabetes  High levels of fetal hemoglobin interfere with the HbA1C  assay. Heterozygous hemoglobin variants (HbS, HgC, etc)do  not significantly interfere with this assay.   However, presence of multiple variants may affect accuracy.         TSH  Recent Labs   Lab 06/07/19  1447   TSH 0.966       The ASCVD Risk score (Pat ZAMARRIPA Jr., et al., 2013) failed to calculate for the following reasons:    The valid systolic blood pressure range is 90 to 200 mmHg             ASSESSMENT AND PLAN     Patient Active Problem List   Diagnosis    Uncontrolled type 2 diabetes mellitus with stage 3 chronic kidney disease, without long-term current use of insulin    Essential hypertension    Hyperlipidemia, acquired    ED (erectile dysfunction)    CKD (chronic kidney disease), stage III    History of diabetic ulcer of foot    Osteomyelitis of second toe of left foot    Type 2 diabetes mellitus with foot ulcer, without long-term current use of insulin    Long term (current) use of antibiotics       1.  Uncontrolled hypertension:  Patient with uncontrolled hypertension.  Importance of dietary compliance was discussed in detail with the patient.  Low-salt diet was discussed in detail with the patient.  I have asked the patient to start maintaining a blood pressure log at home, because the patient states that he thinks his blood pressure is higher when he goes to doctor's office as compared to when he is at home.  However at home also it is elevated.  I  will add Norvasc 10 mg daily to his regimen and see him back in 2 weeks check his blood pressure and titrate his medications accordingly.    2D echocardiogram showed normal LV systolic function with mild LVH.    · Concentric left ventricular remodeling.  · Normal left ventricular systolic function. The estimated ejection fraction is 65%.  · Normal LV diastolic function.  · No wall motion abnormalities.  · Normal right ventricular systolic function.  · No pulmonary hypertension present.               Thank you very much for involving me in the care of your patient.  Please do not hesitate to contact me if there are any questions.      Marylou Shaver MD, FACC, [facility]  Interventional Cardiologist, [facility].           This note was dictated with the help of speech recognition software.  There might be un-intended errors and/or substitutions.

## 2020-02-06 ENCOUNTER — TELEPHONE (OUTPATIENT)
Dept: PODIATRY | Facility: CLINIC | Age: 55
End: 2020-02-06

## 2020-02-06 NOTE — TELEPHONE ENCOUNTER
----- Message from Sam Oconnell sent at 2/6/2020 12:24 PM CST -----  Contact: Kianna from [facility] [phone] ext [phone]  Type: Patient Call Back    Who called:Kianna     What is the request in detail: Kianna from [facility] called to speak to the staff in regards to the patient's order for steel toe boots     Can the clinic reply by [facility]?no    Would the patient rather a call back or a response via [facility]? Call back     Best call back number:[phone] ext [phone]

## 2020-02-16 RX ORDER — DULAGLUTIDE 0.75 MG/.5ML
INJECTION, SOLUTION SUBCUTANEOUS
Qty: 2 ML | Refills: 5 | Status: ON HOLD | OUTPATIENT
Start: 2020-02-16 | End: 2020-08-07 | Stop reason: HOSPADM

## 2020-02-20 ENCOUNTER — PATIENT OUTREACH (OUTPATIENT)
Dept: ADMINISTRATIVE | Facility: OTHER | Age: 55
End: 2020-02-20

## 2020-02-20 ENCOUNTER — OFFICE VISIT (OUTPATIENT)
Dept: CARDIOLOGY | Facility: CLINIC | Age: 55
End: 2020-02-20
Payer: COMMERCIAL

## 2020-02-20 VITALS
BODY MASS INDEX: 32.58 KG/M2 | WEIGHT: 246.94 LBS | RESPIRATION RATE: 16 BRPM | SYSTOLIC BLOOD PRESSURE: 159 MMHG | OXYGEN SATURATION: 99 % | DIASTOLIC BLOOD PRESSURE: 95 MMHG | HEART RATE: 86 BPM

## 2020-02-20 DIAGNOSIS — I10 ESSENTIAL HYPERTENSION: Primary | ICD-10-CM

## 2020-02-20 PROCEDURE — 3008F PR BODY MASS INDEX (BMI) DOCUMENTED: ICD-10-PCS | Mod: CPTII,S$GLB,, | Performed by: INTERNAL MEDICINE

## 2020-02-20 PROCEDURE — 99999 PR PBB SHADOW E&M-EST. PATIENT-LVL III: ICD-10-PCS | Mod: PBBFAC,,, | Performed by: INTERNAL MEDICINE

## 2020-02-20 PROCEDURE — 3080F DIAST BP >= 90 MM HG: CPT | Mod: CPTII,S$GLB,, | Performed by: INTERNAL MEDICINE

## 2020-02-20 PROCEDURE — 99214 OFFICE O/P EST MOD 30 MIN: CPT | Mod: S$GLB,,, | Performed by: INTERNAL MEDICINE

## 2020-02-20 PROCEDURE — 99999 PR PBB SHADOW E&M-EST. PATIENT-LVL III: CPT | Mod: PBBFAC,,, | Performed by: INTERNAL MEDICINE

## 2020-02-20 PROCEDURE — 3077F SYST BP >= 140 MM HG: CPT | Mod: CPTII,S$GLB,, | Performed by: INTERNAL MEDICINE

## 2020-02-20 PROCEDURE — 3008F BODY MASS INDEX DOCD: CPT | Mod: CPTII,S$GLB,, | Performed by: INTERNAL MEDICINE

## 2020-02-20 PROCEDURE — 3080F PR MOST RECENT DIASTOLIC BLOOD PRESSURE >= 90 MM HG: ICD-10-PCS | Mod: CPTII,S$GLB,, | Performed by: INTERNAL MEDICINE

## 2020-02-20 PROCEDURE — 3077F PR MOST RECENT SYSTOLIC BLOOD PRESSURE >= 140 MM HG: ICD-10-PCS | Mod: CPTII,S$GLB,, | Performed by: INTERNAL MEDICINE

## 2020-02-20 PROCEDURE — 99214 PR OFFICE/OUTPT VISIT, EST, LEVL IV, 30-39 MIN: ICD-10-PCS | Mod: S$GLB,,, | Performed by: INTERNAL MEDICINE

## 2020-02-20 NOTE — PROGRESS NOTES
Chart reviewed.   Requested updates from [facility] Every[facility].  Immunizations reconciled.   HM updated.    Eye cam orders already placed.

## 2020-02-20 NOTE — PROGRESS NOTES
CARDIOVASCULAR CONSULTATION    REASON FOR CONSULT:   Catalino Mcfadden is a 54 y.o. male who presents for for evaluation of hypertension.      HISTORY OF PRESENT ILLNESS:     Patient is a pleasant 54-year-old man.  Has been referred to me because of elevated blood pressure.  Is being followed by Podiatry also for ulcers on his feet.  States that the ulcers have been healing fine.  Denies any chest pains at rest on exertion, orthopnea, PND, swelling of feet.  ABIs were checked and were within normal limits.  States that lately has been eating a lot of salt in his diet and has not been eating well.  States that he checks his blood pressure at home and generally stays around 150-160 mm systolic.      The right ankle brachial index was 1.16 which is normal.   The left ankle brachial index was 1.27 which is normal.   The right TBI is 0.79.   The left TBI is 0.68.     1. Right lower extremity pressures and waveforms indicate no hemodynamically significant arterial occlusive disease.  2. Left lower extremity pressures and waveforms indicate no hemodynamically significant arterial occlusive disease.      Notes from February 2020:  Patient here for follow-up.  Denies any chest pains at rest on exertion, orthopnea, PND.  Blood pressure elevated in clinic today.  States has been compliant with his medications.  Denies any chest pain at rest on exertion, PND, headaches.  States that when it is a pressure medication sometimes he feels a little dizzy after taking the medications.  States that occasionally checks his blood pressure at when he gets dizzy and usually it is around 30-1 40/90 mm mercury.  However does not check his blood pressure regularly.      Notes from February 20th a 2020:  Patient here for follow-up.  Brings a blood pressure log from home.  Blood pressure much better controlled now.  At home blood pressure around 120-1 30/80 mm systolic.  Denies any symptoms of chest pains at rest on exertion, orthopnea, PND, swelling  of feet.      · Concentric left ventricular remodeling.  · Normal left ventricular systolic function. The estimated ejection fraction is 65%.  · Normal LV diastolic function.  · No wall motion abnormalities.  · Normal right ventricular systolic function.  · No pulmonary hypertension present.         PAST MEDICAL HISTORY:     Past Medical History:   Diagnosis Date    Diabetes mellitus, type 2     Hyperlipidemia     Hypertension        PAST SURGICAL HISTORY:     Past Surgical History:   Procedure Laterality Date    CERVICAL DISC SURGERY  07/11/2016       ALLERGIES AND MEDICATION:     Review of patient's allergies indicates:   Allergen Reactions    Morphine Hallucinations        Medication List           Accurate as of February 20, 2020 11:06 AM. If you have any questions, ask your nurse or doctor.               CONTINUE taking these medications    amLODIPine 10 MG tablet  Commonly known as:  NORVASC  Take 1 tablet (10 mg total) by mouth once daily.     atorvastatin 40 MG tablet  Commonly known as:  LIPITOR  Take 1 tablet (40 mg total) by mouth once daily.     blood sugar diagnostic Strp  1 strip by Misc.(Non-Drug; Combo Route) route 3 (three) times daily. Patient needs One Touch Test Strips (Berio).     chlorthalidone 25 MG Tab  Commonly known as:  HYGROTEN  Take 1 tablet (25 mg total) by mouth once daily.     diltiaZEM 360 MG 24 hr capsule  Commonly known as:  CARDIZEM CD  Take 1 capsule (360 mg total) by mouth once daily.     HYDROcodone-acetaminophen 7.5-325 mg per tablet  Commonly known as:  NORCO     ibuprofen 800 MG tablet  Commonly known as:  ADVIL,MOTRIN     lisinopril 20 MG tablet  Commonly known as:  PRINIVIL,ZESTRIL  Take 2 tablets (40 mg total) by mouth once daily.     Trulicity 0.75 mg/0.5 mL Pnij  Generic drug:  dulaglutide  INJECT 0.5 ML UNDER THE SKIN EVERY 7 DAYS            SOCIAL HISTORY:     Social History     Socioeconomic History    Marital status:      Spouse name: Not on file     Number of children: Not on file    Years of education: Not on file    Highest education level: Not on file   Occupational History    Not on file   Social Needs    Financial resource strain: Not on file    Food insecurity:     Worry: Not on file     Inability: Not on file    Transportation needs:     Medical: Not on file     Non-medical: Not on file   Tobacco Use    Smoking status: Never Smoker    Smokeless tobacco: Never Used   Substance and Sexual Activity    Alcohol use: Yes     Alcohol/week: 0.0 standard drinks     Comment: social    Drug use: No    Sexual activity: Not on file   Lifestyle    Physical activity:     Days per week: Not on file     Minutes per session: Not on file    Stress: Not on file   Relationships    Social connections:     Talks on phone: Not on file     Gets together: Not on file     Attends Church service: Not on file     Active member of club or organization: Not on file     Attends meetings of clubs or organizations: Not on file     Relationship status: Not on file   Other Topics Concern    Not on file   Social History Narrative    Not on file       FAMILY HISTORY:   History reviewed. No pertinent family history.    REVIEW OF SYSTEMS:   Review of Systems   Constitution: Negative.   HENT: Negative.    Eyes: Negative.    Respiratory: Negative.    Endocrine: Negative.    Hematologic/Lymphatic: Negative.    Skin: Negative.    Musculoskeletal: Negative.    Gastrointestinal: Negative.    Genitourinary: Negative.    Neurological: Negative.    Psychiatric/Behavioral: Negative.    Allergic/Immunologic: Negative.        A 10 point review of systems was performed and all the pertinent positives have been mentioned. Rest of review of systems was negative.        PHYSICAL EXAM:     Vitals:    02/20/20 1040   BP: (!) 159/95   Pulse: 86   Resp: 16    Body mass index is 32.58 kg/m².  Weight: 112 kg (246 lb 14.6 oz)         Physical Exam   Constitutional: He is oriented to person, place,  and time. He appears well-developed and well-nourished.   HENT:   Head: Normocephalic.   Eyes: Pupils are equal, round, and reactive to light. Conjunctivae are normal.   Neck: Normal range of motion. Neck supple.   Cardiovascular: Normal rate, regular rhythm and normal heart sounds.   Pulmonary/Chest: Effort normal and breath sounds normal.   Abdominal: Soft. Bowel sounds are normal.   Neurological: He is alert and oriented to person, place, and time.   Skin: Skin is warm.   Vitals reviewed.        DATA:     Laboratory:  CBC:  Recent Labs   Lab 12/13/19  1035 12/20/19  1056 12/27/19  0846   WBC 5.65 5.81 5.98   Hemoglobin 14.0 14.2 13.6 L   Hematocrit 44.8 44.9 43.0   Platelets 229 237 205       CHEMISTRIES:  Recent Labs   Lab 12/20/19  1056 12/27/19  0846 01/27/20  0725   Glucose 181 H 231 H 204 H  204 H   Sodium 136 134 L 135 L  135 L   Potassium 4.4 4.9 4.8  4.8   BUN, Bld 36 H 40 H 48 H  48 H   Creatinine 2.5 H 2.7 H 3.1 H  3.1 H   eGFR if  32 A 29 A 24.8 A  24.8 A   eGFR if non  28 A 25 A 21.5 A  21.5 A   Calcium 9.3 9.4 9.0  9.0       CARDIAC BIOMARKERS:        COAGS:        LIPIDS/LFTS:  Recent Labs   Lab 02/12/18  1209  05/27/19  0850 08/28/19  1111  12/20/19  1056 12/27/19  0846 01/27/20  0725   Cholesterol 251 H  --  230 H 173  --   --   --   --    Triglycerides 132  --  113 92  --   --   --   --    HDL 42  --  49 45  --   --   --   --    LDL Cholesterol 182.6 H  --  158.4 109.6  --   --   --   --    Non-HDL Cholesterol 209  --  181 128  --   --   --   --    AST  --    < > 37 32   < > 46 H 42 H 34   ALT  --    < > 43 41   < > 64 H 51 H 36    < > = values in this interval not displayed.       Hemoglobin A1C   Date Value Ref Range Status   01/27/2020 8.4 (H) 4.0 - 5.6 % Final     Comment:     ADA Screening Guidelines:  5.7-6.4%  Consistent with prediabetes  >or=6.5%  Consistent with diabetes  High levels of fetal hemoglobin interfere with the HbA1C  assay. Heterozygous  hemoglobin variants (HbS, HgC, etc)do  not significantly interfere with this assay.   However, presence of multiple variants may affect accuracy.     08/28/2019 8.9 (H) 4.0 - 5.6 % Final     Comment:     ADA Screening Guidelines:  5.7-6.4%  Consistent with prediabetes  >or=6.5%  Consistent with diabetes  High levels of fetal hemoglobin interfere with the HbA1C  assay. Heterozygous hemoglobin variants (HbS, HgC, etc)do  not significantly interfere with this assay.   However, presence of multiple variants may affect accuracy.     05/27/2019 8.5 (H) 4.0 - 5.6 % Final     Comment:     ADA Screening Guidelines:  5.7-6.4%  Consistent with prediabetes  >or=6.5%  Consistent with diabetes  High levels of fetal hemoglobin interfere with the HbA1C  assay. Heterozygous hemoglobin variants (HbS, HgC, etc)do  not significantly interfere with this assay.   However, presence of multiple variants may affect accuracy.         TSH  Recent Labs   Lab 06/07/19  1447   TSH 0.966       The 10-year ASCVD risk score (Pat DALLIN Jr., et al., 2013) is: 28.2%    Values used to calculate the score:      Age: 54 years      Sex: Male      Is Non- : Yes      Diabetic: Yes      Tobacco smoker: No      Systolic Blood Pressure: 159 mmHg      Is BP treated: Yes      HDL Cholesterol: 45 mg/dL      Total Cholesterol: 173 mg/dL             ASSESSMENT AND PLAN     Patient Active Problem List   Diagnosis    Uncontrolled type 2 diabetes mellitus with stage 3 chronic kidney disease, without long-term current use of insulin    Essential hypertension    Hyperlipidemia, acquired    ED (erectile dysfunction)    CKD (chronic kidney disease), stage III    History of diabetic ulcer of foot    Osteomyelitis of second toe of left foot    Type 2 diabetes mellitus with foot ulcer, without long-term current use of insulin    Long term (current) use of antibiotics       1.  Hypertension:  Better controlled on current therapy.  Continue  current therapy.  On lisinopril per his nephrologist.  Keep close eye on creatinine.  Has appointment with his nephrologist in 2 weeks.      2D echocardiogram showed normal LV systolic function with mild LVH.    · Concentric left ventricular remodeling.  · Normal left ventricular systolic function. The estimated ejection fraction is 65%.  · Normal LV diastolic function.  · No wall motion abnormalities.  · Normal right ventricular systolic function.  · No pulmonary hypertension present.       2.  Chronic kidney disease:  Management per Nephrology    Follow-up in Cardiology Clinic in 3 months.        Thank you very much for involving me in the care of your patient.  Please do not hesitate to contact me if there are any questions.      Marylou Shaver MD, FACC, [facility]  Interventional Cardiologist, [facility].           This note was dictated with the help of speech recognition software.  There might be un-intended errors and/or substitutions.

## 2020-02-21 ENCOUNTER — TELEPHONE (OUTPATIENT)
Dept: NEPHROLOGY | Facility: CLINIC | Age: 55
End: 2020-02-21

## 2020-02-21 DIAGNOSIS — N18.30 STAGE 3 CHRONIC KIDNEY DISEASE: Primary | ICD-10-CM

## 2020-02-21 NOTE — TELEPHONE ENCOUNTER
----- Message from Tenzin Chester sent at 2/21/2020  1:36 PM CST -----  Contact: Pt  Pt would like to be called back regarding rescheduling appt for 3/10/2020. Pt is requesting to change appt due to work schedule. I was blocked for next available.    Pt can be reached at [phone].    Thank You.

## 2020-02-24 ENCOUNTER — LAB VISIT (OUTPATIENT)
Dept: LAB | Facility: HOSPITAL | Age: 55
End: 2020-02-24
Attending: INTERNAL MEDICINE
Payer: COMMERCIAL

## 2020-02-24 DIAGNOSIS — N18.30 STAGE 3 CHRONIC KIDNEY DISEASE: ICD-10-CM

## 2020-02-24 LAB
ALBUMIN SERPL BCP-MCNC: 2.8 G/DL (ref 3.5–5.2)
ANION GAP SERPL CALC-SCNC: 6 MMOL/L (ref 8–16)
BASOPHILS # BLD AUTO: 0.04 K/UL (ref 0–0.2)
BASOPHILS NFR BLD: 0.8 % (ref 0–1.9)
BUN SERPL-MCNC: 39 MG/DL (ref 6–20)
CALCIUM SERPL-MCNC: 8.8 MG/DL (ref 8.7–10.5)
CHLORIDE SERPL-SCNC: 102 MMOL/L (ref 95–110)
CO2 SERPL-SCNC: 26 MMOL/L (ref 23–29)
CREAT SERPL-MCNC: 2.8 MG/DL (ref 0.5–1.4)
DIFFERENTIAL METHOD: ABNORMAL
EOSINOPHIL # BLD AUTO: 0.3 K/UL (ref 0–0.5)
EOSINOPHIL NFR BLD: 5 % (ref 0–8)
ERYTHROCYTE [DISTWIDTH] IN BLOOD BY AUTOMATED COUNT: 13.3 % (ref 11.5–14.5)
EST. GFR  (AFRICAN AMERICAN): 28.1 ML/MIN/1.73 M^2
EST. GFR  (NON AFRICAN AMERICAN): 24.3 ML/MIN/1.73 M^2
GLUCOSE SERPL-MCNC: 221 MG/DL (ref 70–110)
HCT VFR BLD AUTO: 40.6 % (ref 40–54)
HGB BLD-MCNC: 12.5 G/DL (ref 14–18)
IMM GRANULOCYTES # BLD AUTO: 0.01 K/UL (ref 0–0.04)
IMM GRANULOCYTES NFR BLD AUTO: 0.2 % (ref 0–0.5)
LYMPHOCYTES # BLD AUTO: 1.2 K/UL (ref 1–4.8)
LYMPHOCYTES NFR BLD: 23.3 % (ref 18–48)
MCH RBC QN AUTO: 27 PG (ref 27–31)
MCHC RBC AUTO-ENTMCNC: 30.8 G/DL (ref 32–36)
MCV RBC AUTO: 88 FL (ref 82–98)
MONOCYTES # BLD AUTO: 0.6 K/UL (ref 0.3–1)
MONOCYTES NFR BLD: 11.5 % (ref 4–15)
NEUTROPHILS # BLD AUTO: 3 K/UL (ref 1.8–7.7)
NEUTROPHILS NFR BLD: 59.2 % (ref 38–73)
NRBC BLD-RTO: 0 /100 WBC
PHOSPHATE SERPL-MCNC: 3.3 MG/DL (ref 2.7–4.5)
PLATELET # BLD AUTO: 226 K/UL (ref 150–350)
PMV BLD AUTO: 10.3 FL (ref 9.2–12.9)
POTASSIUM SERPL-SCNC: 5.3 MMOL/L (ref 3.5–5.1)
RBC # BLD AUTO: 4.63 M/UL (ref 4.6–6.2)
SODIUM SERPL-SCNC: 134 MMOL/L (ref 136–145)
WBC # BLD AUTO: 5.03 K/UL (ref 3.9–12.7)

## 2020-02-24 PROCEDURE — 36415 COLL VENOUS BLD VENIPUNCTURE: CPT | Mod: PO

## 2020-02-24 PROCEDURE — 85025 COMPLETE CBC W/AUTO DIFF WBC: CPT

## 2020-02-24 PROCEDURE — 80069 RENAL FUNCTION PANEL: CPT

## 2020-02-26 ENCOUNTER — PATIENT OUTREACH (OUTPATIENT)
Dept: ADMINISTRATIVE | Facility: HOSPITAL | Age: 55
End: 2020-02-26

## 2020-02-27 ENCOUNTER — PATIENT OUTREACH (OUTPATIENT)
Dept: ADMINISTRATIVE | Facility: OTHER | Age: 55
End: 2020-02-27

## 2020-03-09 ENCOUNTER — PATIENT OUTREACH (OUTPATIENT)
Dept: ADMINISTRATIVE | Facility: OTHER | Age: 55
End: 2020-03-09

## 2020-03-09 NOTE — PROGRESS NOTES
Chart reviewed.   Immunizations: updated  Orders placed: n/a  Upcoming appts to satisfy VIRGILIO topics: n/a  DM eye exam previously ordered.

## 2020-03-10 ENCOUNTER — OFFICE VISIT (OUTPATIENT)
Dept: FAMILY MEDICINE | Facility: CLINIC | Age: 55
End: 2020-03-10
Payer: COMMERCIAL

## 2020-03-10 VITALS
HEIGHT: 73 IN | OXYGEN SATURATION: 98 % | BODY MASS INDEX: 34.85 KG/M2 | TEMPERATURE: 98 F | HEART RATE: 86 BPM | SYSTOLIC BLOOD PRESSURE: 138 MMHG | RESPIRATION RATE: 16 BRPM | DIASTOLIC BLOOD PRESSURE: 86 MMHG | WEIGHT: 263 LBS

## 2020-03-10 DIAGNOSIS — Z23 FLU VACCINE NEED: ICD-10-CM

## 2020-03-10 DIAGNOSIS — I10 ESSENTIAL HYPERTENSION: ICD-10-CM

## 2020-03-10 DIAGNOSIS — N18.4 CKD (CHRONIC KIDNEY DISEASE) STAGE 4, GFR 15-29 ML/MIN: ICD-10-CM

## 2020-03-10 PROCEDURE — 3052F PR MOST RECENT HEMOGLOBIN A1C LEVEL 8.0 - < 9.0%: ICD-10-PCS | Mod: CPTII,S$GLB,, | Performed by: FAMILY MEDICINE

## 2020-03-10 PROCEDURE — 99214 OFFICE O/P EST MOD 30 MIN: CPT | Mod: 25,S$GLB,, | Performed by: FAMILY MEDICINE

## 2020-03-10 PROCEDURE — 99214 PR OFFICE/OUTPT VISIT, EST, LEVL IV, 30-39 MIN: ICD-10-PCS | Mod: 25,S$GLB,, | Performed by: FAMILY MEDICINE

## 2020-03-10 PROCEDURE — 99999 PR PBB SHADOW E&M-EST. PATIENT-LVL III: CPT | Mod: PBBFAC,,, | Performed by: FAMILY MEDICINE

## 2020-03-10 PROCEDURE — 3052F HG A1C>EQUAL 8.0%<EQUAL 9.0%: CPT | Mod: CPTII,S$GLB,, | Performed by: FAMILY MEDICINE

## 2020-03-10 PROCEDURE — 3008F BODY MASS INDEX DOCD: CPT | Mod: CPTII,S$GLB,, | Performed by: FAMILY MEDICINE

## 2020-03-10 PROCEDURE — 90471 FLU VACCINE (QUAD) GREATER THAN OR EQUAL TO 3YO PRESERVATIVE FREE IM: ICD-10-PCS | Mod: S$GLB,,, | Performed by: FAMILY MEDICINE

## 2020-03-10 PROCEDURE — 3079F DIAST BP 80-89 MM HG: CPT | Mod: CPTII,S$GLB,, | Performed by: FAMILY MEDICINE

## 2020-03-10 PROCEDURE — 90686 FLU VACCINE (QUAD) GREATER THAN OR EQUAL TO 3YO PRESERVATIVE FREE IM: ICD-10-PCS | Mod: S$GLB,,, | Performed by: FAMILY MEDICINE

## 2020-03-10 PROCEDURE — 99999 PR PBB SHADOW E&M-EST. PATIENT-LVL III: ICD-10-PCS | Mod: PBBFAC,,, | Performed by: FAMILY MEDICINE

## 2020-03-10 PROCEDURE — 3075F PR MOST RECENT SYSTOLIC BLOOD PRESS GE 130-139MM HG: ICD-10-PCS | Mod: CPTII,S$GLB,, | Performed by: FAMILY MEDICINE

## 2020-03-10 PROCEDURE — 3008F PR BODY MASS INDEX (BMI) DOCUMENTED: ICD-10-PCS | Mod: CPTII,S$GLB,, | Performed by: FAMILY MEDICINE

## 2020-03-10 PROCEDURE — 3075F SYST BP GE 130 - 139MM HG: CPT | Mod: CPTII,S$GLB,, | Performed by: FAMILY MEDICINE

## 2020-03-10 PROCEDURE — 90471 IMMUNIZATION ADMIN: CPT | Mod: S$GLB,,, | Performed by: FAMILY MEDICINE

## 2020-03-10 PROCEDURE — 90686 IIV4 VACC NO PRSV 0.5 ML IM: CPT | Mod: S$GLB,,, | Performed by: FAMILY MEDICINE

## 2020-03-10 PROCEDURE — 3079F PR MOST RECENT DIASTOLIC BLOOD PRESSURE 80-89 MM HG: ICD-10-PCS | Mod: CPTII,S$GLB,, | Performed by: FAMILY MEDICINE

## 2020-03-10 NOTE — PROGRESS NOTES
Health Maintenance Due   Topic     Shingles Vaccine (1 of 2) No hx chickenpox ; consult with pcp    Foot Exam  Seen podiatry 1/27/2020    Pneumococcal Vaccine (Highest Risk) (2 of 3 - PCV13) Consult with pcp     Eye Exam  Pt will follow up with his own eye doctor and will get records fax to clinic     Influenza Vaccine (1) Ok to get today ; consult with pcp

## 2020-03-10 NOTE — PROGRESS NOTES
.Pt received influenza vaccine. Tolerated well. Pt instructed to wait 15mins to be assessed for adverse reactions. verbalized understanding.

## 2020-03-10 NOTE — PROGRESS NOTES
Chief Complaint   Patient presents with    Hypertension     follow up       Catalinodedra Mcfadden is a 54 y.o. male who presents per the Chief Complaint.  Pt is known to me and was last seen by me on 8/29/2019.  All known chronic medical issues have been documented.       Hypertension   This is a chronic problem. The current episode started more than 1 year ago. The problem is unchanged. The problem is controlled. Associated symptoms include neck pain (improved). Pertinent negatives include no anxiety, blurred vision, chest pain, headaches, palpitations, peripheral edema, shortness of breath or sweats. There are no associated agents to hypertension. Risk factors for coronary artery disease include diabetes mellitus, dyslipidemia, male gender and obesity. Past treatments include beta blockers, calcium channel blockers, diuretics and ACE inhibitors. The current treatment provides moderate improvement. There are no compliance problems.  Hypertensive end-organ damage includes kidney disease and retinopathy. There is no history of angina, CAD/MI, CVA, heart failure, left ventricular hypertrophy or PVD. Identifiable causes of hypertension include chronic renal disease. There is no history of coarctation of the aorta, hyperaldosteronism, hypercortisolism, hyperparathyroidism, a hypertension causing med, pheochromocytoma, renovascular disease, sleep apnea or a thyroid problem.   Diabetes   He presents for his follow-up diabetic visit. He has type 2 diabetes mellitus. No MedicAlert identification noted. The initial diagnosis of diabetes was made 20 years ago. His disease course has been improving. Pertinent negatives for hypoglycemia include no confusion, dizziness, headaches, hunger, mood changes, nervousness/anxiousness, pallor, seizures, sleepiness, speech difficulty, sweats or tremors. Associated symptoms include foot paresthesias. Pertinent negatives for diabetes include no blurred vision, no chest pain, no fatigue, no foot  ulcerations, no polydipsia, no polyphagia, no polyuria, no visual change, no weakness and no weight loss. There are no hypoglycemic complications. Pertinent negatives for hypoglycemia complications include no blackouts, no hospitalization, no nocturnal hypoglycemia, no required assistance and no required glucagon injection. Symptoms are improving. Diabetic complications include nephropathy, peripheral neuropathy and retinopathy. Pertinent negatives for diabetic complications include no autonomic neuropathy, CVA, heart disease or PVD. Risk factors for coronary artery disease include hypertension, obesity, sedentary lifestyle and diabetes mellitus. Current diabetic treatment includes diet and oral agent (dual therapy). He is compliant with treatment all of the time. His weight is decreasing steadily. He is following a generally healthy, low fat/cholesterol and low salt diet. Meal planning includes avoidance of concentrated sweets. He has not had a previous visit with a dietitian. He participates in exercise intermittently. He monitors blood glucose at home 1-2 x per day. He monitors urine at home <1 x per month. Blood glucose monitoring compliance is fair. His home blood glucose trend is decreasing steadily. An ACE inhibitor/angiotensin II receptor blocker is being taken. He sees a podiatrist.Eye exam is not current.        ROS  Review of Systems   Constitutional: Negative for activity change, appetite change, chills, fatigue, fever, unexpected weight change and weight loss.   HENT: Negative for congestion, ear pain, hearing loss, postnasal drip, rhinorrhea, sinus pressure, sore throat and trouble swallowing.    Eyes: Negative for blurred vision, pain, discharge and visual disturbance.   Respiratory: Negative for cough, chest tightness, shortness of breath and wheezing.    Cardiovascular: Negative for chest pain and palpitations.   Gastrointestinal: Negative for abdominal pain, blood in stool, constipation, diarrhea,  "nausea and vomiting.   Endocrine: Negative for polydipsia, polyphagia and polyuria.   Genitourinary: Negative for difficulty urinating, dysuria, flank pain, frequency, hematuria, penile pain, penile swelling, scrotal swelling, testicular pain and urgency.   Musculoskeletal: Positive for arthralgias (bilateral toe injury) and neck pain (improved). Negative for back pain, joint swelling, myalgias and neck stiffness.   Skin: Negative.  Negative for pallor.   Allergic/Immunologic: Negative for environmental allergies, food allergies and immunocompromised state.   Neurological: Negative for dizziness, tremors, seizures, speech difficulty, weakness, light-headedness and headaches.   Psychiatric/Behavioral: Negative.  Negative for confusion and dysphoric mood. The patient is not nervous/anxious.        Physical Exam  Vitals:    03/10/20 0815   BP: 138/86   Pulse: 86   Resp: 16   Temp: 98.4 °F (36.9 °C)    Body mass index is 34.7 kg/m².  Weight: 119.3 kg (263 lb 0.1 oz)   Height: 6' 1" (185.4 cm)     Physical Exam   Constitutional: He is oriented to person, place, and time. He appears well-developed and well-nourished. He is active and cooperative.  Non-toxic appearance. He does not have a sickly appearance. He does not appear ill. No distress.   HENT:   Head: Normocephalic and atraumatic.   Right Ear: Hearing and external ear normal. No decreased hearing is noted.   Left Ear: Hearing and external ear normal. No decreased hearing is noted.   Nose: Nose normal. No rhinorrhea or nasal deformity.   Mouth/Throat: Uvula is midline and oropharynx is clear and moist. He does not have dentures. Normal dentition.   Eyes: Pupils are equal, round, and reactive to light. Conjunctivae, EOM and lids are normal. Right eye exhibits no chemosis, no discharge and no exudate. No foreign body present in the right eye. Left eye exhibits no chemosis, no discharge and no exudate. No foreign body present in the left eye. No scleral icterus. "   Neck: Normal range of motion and full passive range of motion without pain. Neck supple.   Cardiovascular: Normal rate, regular rhythm, S1 normal, S2 normal and normal heart sounds. Exam reveals no gallop and no friction rub.   No murmur heard.  Pulmonary/Chest: Effort normal and breath sounds normal. No accessory muscle usage. No respiratory distress. He has no decreased breath sounds. He has no wheezes. He has no rhonchi. He has no rales.   Abdominal: Soft. Normal appearance. He exhibits no distension. There is no hepatosplenomegaly. There is no tenderness. There is no rigidity, no rebound and no guarding.   Musculoskeletal: Normal range of motion.        Cervical back: He exhibits tenderness and pain. He exhibits normal range of motion, no bony tenderness, no swelling, no edema, no deformity, no laceration, no spasm and normal pulse.        Feet:    Neurological: He is alert and oriented to person, place, and time. He has normal strength. No cranial nerve deficit or sensory deficit. He exhibits normal muscle tone. He displays no seizure activity. Coordination and gait normal.   Skin: Skin is warm, dry and intact. No rash noted. He is not diaphoretic.   Psychiatric: He has a normal mood and affect. His speech is normal and behavior is normal. Judgment and thought content normal. Cognition and memory are normal. He is attentive.       Assessment & Plan    Discussion of plan of care including treatment options regarding health and wellness were reviewed and discussed with patient.  Any changes to medication or treatment plan, as well as any screening blood test, imaging, or referrals to specialist, are documented.  Follow up as indicated.     1. Uncontrolled diabetes mellitus with chronic kidney disease, with long-term current use of insulin  Patient is encouraged to follow a diet low in carbohydrates and simple sugars.  Discussed simple vs. complex carbohydrates as well as eating times of certain meals. Advised  to focus on good food choices and increased physical activity and encouraged to adhere to medication regimen and/or lifestyle adjustments, and to check glucose level as recommended.  Contact office if glucose levels are not improving over time.  Will monitor HbA1c appropriately.   - Hemoglobin A1c; Standing    2. Essential hypertension  Patient was counseled and encouraged to maintain a low sodium diet, as well as increasing physical activity.  Recommend random BP checks at home on a regular basis.  Repeat BP at end of visit was not necessary. Will continue medication at this time, and follow up in 3-6 months, or sooner if blood pressure begins to increase.       3. CKD (chronic kidney disease) stage 4, GFR 15-29 ml/min  Stable; encouraged patient to avoid NSAID medications and to increase water and clear liquid hydration.  Will continue to monitor for decline and refer as necessary.     4. Flu vaccine need  Patient requested Flu vaccine, and was consented and vaccine given in office without complication.   - Influenza - Quadrivalent (6 months+) (PF)       Follow up in about 3 months (around 6/10/2020), or if symptoms worsen or fail to improve.        ACTIVE MEDICAL ISSUES:  Documented in Problem List    PAST MEDICAL HISTORY  Documented    PAST SURGICAL HISTORY:  Documented    SOCIAL HISTORY:  Documented    FAMILY HISTORY:  Documented    ALLERGIES AND MEDICATIONS: updated and reviewed.  Documented    Health Maintenance       Date Due Completion Date    HIV Screening 04/04/1979 ---    TETANUS VACCINE 04/04/1982 ---    Shingles Vaccine (1 of 2) 04/04/2014 ---    Foot Exam 06/05/2018 6/5/2017    Pneumococcal Vaccine (Highest Risk) (2 of 3 - PCV13) 06/16/2018 6/16/2017    Eye Exam 04/03/2019 4/3/2018    Influenza Vaccine (1) 09/01/2019 ---    Hemoglobin A1c 04/27/2020 1/27/2020    Fecal Occult Blood Test (FOBT)/[employer] 06/03/2020 6/3/2019    Lipid Panel 08/28/2020 8/28/2019    Low Dose Statin 02/20/2021 2/20/2020

## 2020-03-31 ENCOUNTER — PATIENT OUTREACH (OUTPATIENT)
Dept: ADMINISTRATIVE | Facility: OTHER | Age: 55
End: 2020-03-31

## 2020-04-20 ENCOUNTER — OFFICE VISIT (OUTPATIENT)
Dept: PODIATRY | Facility: CLINIC | Age: 55
End: 2020-04-20
Payer: COMMERCIAL

## 2020-04-20 VITALS
HEART RATE: 93 BPM | BODY MASS INDEX: 34.85 KG/M2 | WEIGHT: 263 LBS | DIASTOLIC BLOOD PRESSURE: 90 MMHG | HEIGHT: 73 IN | SYSTOLIC BLOOD PRESSURE: 168 MMHG

## 2020-04-20 DIAGNOSIS — L97.512 DIABETIC ULCER OF OTHER PART OF RIGHT FOOT ASSOCIATED WITH TYPE 2 DIABETES MELLITUS, WITH FAT LAYER EXPOSED: ICD-10-CM

## 2020-04-20 DIAGNOSIS — L90.9 PLANTAR FAT PAD ATROPHY: ICD-10-CM

## 2020-04-20 DIAGNOSIS — M21.6X1 PLANTARFLEXION DEFORMITY OF RIGHT FOOT: ICD-10-CM

## 2020-04-20 DIAGNOSIS — M20.5X1 HALLUX LIMITUS, ACQUIRED, RIGHT: ICD-10-CM

## 2020-04-20 DIAGNOSIS — E11.621 DIABETIC ULCER OF OTHER PART OF RIGHT FOOT ASSOCIATED WITH TYPE 2 DIABETES MELLITUS, WITH FAT LAYER EXPOSED: ICD-10-CM

## 2020-04-20 DIAGNOSIS — E11.49 TYPE II DIABETES MELLITUS WITH NEUROLOGICAL MANIFESTATIONS: Primary | ICD-10-CM

## 2020-04-20 PROCEDURE — 87077 CULTURE AEROBIC IDENTIFY: CPT

## 2020-04-20 PROCEDURE — 3052F HG A1C>EQUAL 8.0%<EQUAL 9.0%: CPT | Mod: CPTII,S$GLB,, | Performed by: PODIATRIST

## 2020-04-20 PROCEDURE — 11042 WOUND DEBRIDEMENT: ICD-10-PCS | Mod: S$GLB,,, | Performed by: PODIATRIST

## 2020-04-20 PROCEDURE — 87076 CULTURE ANAEROBE IDENT EACH: CPT

## 2020-04-20 PROCEDURE — 3008F PR BODY MASS INDEX (BMI) DOCUMENTED: ICD-10-PCS | Mod: CPTII,S$GLB,, | Performed by: PODIATRIST

## 2020-04-20 PROCEDURE — 87075 CULTR BACTERIA EXCEPT BLOOD: CPT

## 2020-04-20 PROCEDURE — 87186 SC STD MICRODIL/AGAR DIL: CPT

## 2020-04-20 PROCEDURE — 3008F BODY MASS INDEX DOCD: CPT | Mod: CPTII,S$GLB,, | Performed by: PODIATRIST

## 2020-04-20 PROCEDURE — 11042 DBRDMT SUBQ TIS 1ST 20SQCM/<: CPT | Mod: S$GLB,,, | Performed by: PODIATRIST

## 2020-04-20 PROCEDURE — 3080F PR MOST RECENT DIASTOLIC BLOOD PRESSURE >= 90 MM HG: ICD-10-PCS | Mod: CPTII,S$GLB,, | Performed by: PODIATRIST

## 2020-04-20 PROCEDURE — 99999 PR PBB SHADOW E&M-EST. PATIENT-LVL III: ICD-10-PCS | Mod: PBBFAC,,, | Performed by: PODIATRIST

## 2020-04-20 PROCEDURE — 87070 CULTURE OTHR SPECIMN AEROBIC: CPT

## 2020-04-20 PROCEDURE — 3077F SYST BP >= 140 MM HG: CPT | Mod: CPTII,S$GLB,, | Performed by: PODIATRIST

## 2020-04-20 PROCEDURE — 3077F PR MOST RECENT SYSTOLIC BLOOD PRESSURE >= 140 MM HG: ICD-10-PCS | Mod: CPTII,S$GLB,, | Performed by: PODIATRIST

## 2020-04-20 PROCEDURE — 99214 OFFICE O/P EST MOD 30 MIN: CPT | Mod: 25,S$GLB,, | Performed by: PODIATRIST

## 2020-04-20 PROCEDURE — 3080F DIAST BP >= 90 MM HG: CPT | Mod: CPTII,S$GLB,, | Performed by: PODIATRIST

## 2020-04-20 PROCEDURE — 99999 PR PBB SHADOW E&M-EST. PATIENT-LVL III: CPT | Mod: PBBFAC,,, | Performed by: PODIATRIST

## 2020-04-20 PROCEDURE — 99214 PR OFFICE/OUTPT VISIT, EST, LEVL IV, 30-39 MIN: ICD-10-PCS | Mod: 25,S$GLB,, | Performed by: PODIATRIST

## 2020-04-20 PROCEDURE — 3052F PR MOST RECENT HEMOGLOBIN A1C LEVEL 8.0 - < 9.0%: ICD-10-PCS | Mod: CPTII,S$GLB,, | Performed by: PODIATRIST

## 2020-04-20 RX ORDER — CIPROFLOXACIN 500 MG/1
500 TABLET ORAL 2 TIMES DAILY
Qty: 20 TABLET | Refills: 0 | Status: SHIPPED | OUTPATIENT
Start: 2020-04-20 | End: 2020-04-30
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
## 2020-04-22 LAB — BACTERIA SPEC AEROBE CULT: ABNORMAL

## 2020-04-23 ENCOUNTER — PATIENT OUTREACH (OUTPATIENT)
Dept: ADMINISTRATIVE | Facility: OTHER | Age: 55
End: 2020-04-23

## 2020-04-24 ENCOUNTER — PATIENT MESSAGE (OUTPATIENT)
Dept: PODIATRY | Facility: CLINIC | Age: 55
End: 2020-04-24

## 2020-04-24 LAB — BACTERIA SPEC ANAEROBE CULT: ABNORMAL

## 2020-04-26 DIAGNOSIS — I10 ESSENTIAL HYPERTENSION: ICD-10-CM

## 2020-04-27 ENCOUNTER — OFFICE VISIT (OUTPATIENT)
Dept: PODIATRY | Facility: CLINIC | Age: 55
End: 2020-04-27
Payer: COMMERCIAL

## 2020-04-27 VITALS — WEIGHT: 263 LBS | BODY MASS INDEX: 34.85 KG/M2 | HEIGHT: 73 IN

## 2020-04-27 DIAGNOSIS — E11.621 DIABETIC ULCER OF OTHER PART OF RIGHT FOOT ASSOCIATED WITH TYPE 2 DIABETES MELLITUS, WITH FAT LAYER EXPOSED: ICD-10-CM

## 2020-04-27 DIAGNOSIS — L97.512 DIABETIC ULCER OF OTHER PART OF RIGHT FOOT ASSOCIATED WITH TYPE 2 DIABETES MELLITUS, WITH FAT LAYER EXPOSED: ICD-10-CM

## 2020-04-27 DIAGNOSIS — E11.49 TYPE II DIABETES MELLITUS WITH NEUROLOGICAL MANIFESTATIONS: Primary | ICD-10-CM

## 2020-04-27 PROCEDURE — 3052F HG A1C>EQUAL 8.0%<EQUAL 9.0%: CPT | Mod: CPTII,S$GLB,, | Performed by: PODIATRIST

## 2020-04-27 PROCEDURE — 99999 PR PBB SHADOW E&M-EST. PATIENT-LVL II: CPT | Mod: PBBFAC,,, | Performed by: PODIATRIST

## 2020-04-27 PROCEDURE — 99213 PR OFFICE/OUTPT VISIT, EST, LEVL III, 20-29 MIN: ICD-10-PCS | Mod: S$GLB,,, | Performed by: PODIATRIST

## 2020-04-27 PROCEDURE — 3052F PR MOST RECENT HEMOGLOBIN A1C LEVEL 8.0 - < 9.0%: ICD-10-PCS | Mod: CPTII,S$GLB,, | Performed by: PODIATRIST

## 2020-04-27 PROCEDURE — 99999 PR PBB SHADOW E&M-EST. PATIENT-LVL II: ICD-10-PCS | Mod: PBBFAC,,, | Performed by: PODIATRIST

## 2020-04-27 PROCEDURE — 3008F BODY MASS INDEX DOCD: CPT | Mod: CPTII,S$GLB,, | Performed by: PODIATRIST

## 2020-04-27 PROCEDURE — 3008F PR BODY MASS INDEX (BMI) DOCUMENTED: ICD-10-PCS | Mod: CPTII,S$GLB,, | Performed by: PODIATRIST

## 2020-04-27 PROCEDURE — 99213 OFFICE O/P EST LOW 20 MIN: CPT | Mod: S$GLB,,, | Performed by: PODIATRIST

## 2020-04-27 RX ORDER — DILTIAZEM HYDROCHLORIDE 240 MG/1
CAPSULE, COATED, EXTENDED RELEASE ORAL
Qty: 90 CAPSULE | Refills: 1 | OUTPATIENT
Start: 2020-04-27

## 2020-04-30 ENCOUNTER — PATIENT OUTREACH (OUTPATIENT)
Dept: ADMINISTRATIVE | Facility: OTHER | Age: 55
End: 2020-04-30

## 2020-05-04 ENCOUNTER — OFFICE VISIT (OUTPATIENT)
Dept: PODIATRY | Facility: CLINIC | Age: 55
End: 2020-05-04
Payer: COMMERCIAL

## 2020-05-04 VITALS
HEIGHT: 73 IN | WEIGHT: 263 LBS | BODY MASS INDEX: 34.85 KG/M2 | SYSTOLIC BLOOD PRESSURE: 150 MMHG | DIASTOLIC BLOOD PRESSURE: 84 MMHG

## 2020-05-04 DIAGNOSIS — L97.512 DIABETIC ULCER OF OTHER PART OF RIGHT FOOT ASSOCIATED WITH TYPE 2 DIABETES MELLITUS, WITH FAT LAYER EXPOSED: ICD-10-CM

## 2020-05-04 DIAGNOSIS — E11.49 TYPE II DIABETES MELLITUS WITH NEUROLOGICAL MANIFESTATIONS: Primary | ICD-10-CM

## 2020-05-04 DIAGNOSIS — E11.621 DIABETIC ULCER OF OTHER PART OF RIGHT FOOT ASSOCIATED WITH TYPE 2 DIABETES MELLITUS, WITH FAT LAYER EXPOSED: ICD-10-CM

## 2020-05-04 PROCEDURE — 3052F PR MOST RECENT HEMOGLOBIN A1C LEVEL 8.0 - < 9.0%: ICD-10-PCS | Mod: CPTII,S$GLB,, | Performed by: PODIATRIST

## 2020-05-04 PROCEDURE — 3077F SYST BP >= 140 MM HG: CPT | Mod: CPTII,S$GLB,, | Performed by: PODIATRIST

## 2020-05-04 PROCEDURE — 3008F PR BODY MASS INDEX (BMI) DOCUMENTED: ICD-10-PCS | Mod: CPTII,S$GLB,, | Performed by: PODIATRIST

## 2020-05-04 PROCEDURE — 99213 OFFICE O/P EST LOW 20 MIN: CPT | Mod: S$GLB,,, | Performed by: PODIATRIST

## 2020-05-04 PROCEDURE — 3052F HG A1C>EQUAL 8.0%<EQUAL 9.0%: CPT | Mod: CPTII,S$GLB,, | Performed by: PODIATRIST

## 2020-05-04 PROCEDURE — 3079F PR MOST RECENT DIASTOLIC BLOOD PRESSURE 80-89 MM HG: ICD-10-PCS | Mod: CPTII,S$GLB,, | Performed by: PODIATRIST

## 2020-05-04 PROCEDURE — 3079F DIAST BP 80-89 MM HG: CPT | Mod: CPTII,S$GLB,, | Performed by: PODIATRIST

## 2020-05-04 PROCEDURE — 99213 PR OFFICE/OUTPT VISIT, EST, LEVL III, 20-29 MIN: ICD-10-PCS | Mod: S$GLB,,, | Performed by: PODIATRIST

## 2020-05-04 PROCEDURE — 99999 PR PBB SHADOW E&M-EST. PATIENT-LVL III: CPT | Mod: PBBFAC,,, | Performed by: PODIATRIST

## 2020-05-04 PROCEDURE — 3008F BODY MASS INDEX DOCD: CPT | Mod: CPTII,S$GLB,, | Performed by: PODIATRIST

## 2020-05-04 PROCEDURE — 3077F PR MOST RECENT SYSTOLIC BLOOD PRESSURE >= 140 MM HG: ICD-10-PCS | Mod: CPTII,S$GLB,, | Performed by: PODIATRIST

## 2020-05-04 PROCEDURE — 99999 PR PBB SHADOW E&M-EST. PATIENT-LVL III: ICD-10-PCS | Mod: PBBFAC,,, | Performed by: PODIATRIST
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
## 2020-05-06 ENCOUNTER — TELEPHONE (OUTPATIENT)
Dept: CARDIOLOGY | Facility: CLINIC | Age: 55
End: 2020-05-06

## 2020-05-06 NOTE — TELEPHONE ENCOUNTER
Pt has appt 05/25/20 at [facility], appt cancelled due to Dr Shaver will not be at this location until further notice.  Advised If pt wants to see provider to call office to see him [facility]

## 2020-05-15 ENCOUNTER — PATIENT OUTREACH (OUTPATIENT)
Dept: ADMINISTRATIVE | Facility: OTHER | Age: 55
End: 2020-05-15

## 2020-05-15 ENCOUNTER — TELEPHONE (OUTPATIENT)
Dept: PODIATRY | Facility: CLINIC | Age: 55
End: 2020-05-15

## 2020-05-15 DIAGNOSIS — E11.9 TYPE 2 DIABETES MELLITUS WITHOUT COMPLICATION, UNSPECIFIED WHETHER LONG TERM INSULIN USE: Primary | ICD-10-CM

## 2020-05-15 NOTE — TELEPHONE ENCOUNTER
Pt called to try in be seen at 6:30 A.M. I informed the pt that the schedule is boooked and if someone cancel I'll call him to come in earlier.    Viridiana ALDRIDGE    ----- Message from Jackie Parsons sent at 5/15/2020  3:19 PM CDT -----  Contact: pt  Name of Who is Calling: JOMAR LEDESMA [[id number]]    What is the request in detail: Patient is requesting a call back regards to change the appt to something early .......Please contact to further discuss and advise      Can the clinic reply by [facility]: no    [facility]  [phone] (home)

## 2020-05-16 ENCOUNTER — PATIENT MESSAGE (OUTPATIENT)
Dept: PODIATRY | Facility: CLINIC | Age: 55
End: 2020-05-16

## 2020-05-18 DIAGNOSIS — I10 ESSENTIAL HYPERTENSION: ICD-10-CM

## 2020-05-18 RX ORDER — LISINOPRIL 20 MG/1
TABLET ORAL
Qty: 90 TABLET | Refills: 0 | Status: ON HOLD | OUTPATIENT
Start: 2020-05-18 | End: 2020-07-24 | Stop reason: HOSPADM

## 2020-05-18 RX ORDER — CHLORTHALIDONE 25 MG/1
TABLET ORAL
Qty: 90 TABLET | Refills: 0 | Status: ON HOLD | OUTPATIENT
Start: 2020-05-18 | End: 2020-07-21 | Stop reason: HOSPADM

## 2020-05-21 DIAGNOSIS — I10 ESSENTIAL HYPERTENSION: ICD-10-CM

## 2020-05-21 RX ORDER — DILTIAZEM HYDROCHLORIDE 240 MG/1
CAPSULE, COATED, EXTENDED RELEASE ORAL
Qty: 90 CAPSULE | Refills: 0 | Status: ON HOLD | OUTPATIENT
Start: 2020-05-21 | End: 2020-07-21 | Stop reason: HOSPADM

## 2020-05-22 ENCOUNTER — PATIENT OUTREACH (OUTPATIENT)
Dept: ADMINISTRATIVE | Facility: OTHER | Age: 55
End: 2020-05-22

## 2020-05-25 ENCOUNTER — OFFICE VISIT (OUTPATIENT)
Dept: PODIATRY | Facility: CLINIC | Age: 55
End: 2020-05-25
Payer: COMMERCIAL

## 2020-05-25 VITALS — TEMPERATURE: 98 F | BODY MASS INDEX: 34.85 KG/M2 | WEIGHT: 263 LBS | HEIGHT: 73 IN

## 2020-05-25 DIAGNOSIS — M20.41 HAMMER TOES OF BOTH FEET: ICD-10-CM

## 2020-05-25 DIAGNOSIS — L90.9 PLANTAR FAT PAD ATROPHY: ICD-10-CM

## 2020-05-25 DIAGNOSIS — M21.6X1 PLANTARFLEXION DEFORMITY OF RIGHT FOOT: ICD-10-CM

## 2020-05-25 DIAGNOSIS — M20.42 HAMMER TOES OF BOTH FEET: ICD-10-CM

## 2020-05-25 DIAGNOSIS — M20.5X1 HALLUX LIMITUS, ACQUIRED, RIGHT: ICD-10-CM

## 2020-05-25 DIAGNOSIS — E11.49 TYPE II DIABETES MELLITUS WITH NEUROLOGICAL MANIFESTATIONS: Primary | ICD-10-CM

## 2020-05-25 DIAGNOSIS — Z86.31 HISTORY OF DIABETIC ULCER OF FOOT: ICD-10-CM

## 2020-05-25 PROCEDURE — 99213 OFFICE O/P EST LOW 20 MIN: CPT | Mod: S$GLB,,, | Performed by: PODIATRIST

## 2020-05-25 PROCEDURE — 3008F PR BODY MASS INDEX (BMI) DOCUMENTED: ICD-10-PCS | Mod: CPTII,S$GLB,, | Performed by: PODIATRIST

## 2020-05-25 PROCEDURE — 3052F HG A1C>EQUAL 8.0%<EQUAL 9.0%: CPT | Mod: CPTII,S$GLB,, | Performed by: PODIATRIST

## 2020-05-25 PROCEDURE — 99213 PR OFFICE/OUTPT VISIT, EST, LEVL III, 20-29 MIN: ICD-10-PCS | Mod: S$GLB,,, | Performed by: PODIATRIST

## 2020-05-25 PROCEDURE — 99999 PR PBB SHADOW E&M-EST. PATIENT-LVL III: CPT | Mod: PBBFAC,,, | Performed by: PODIATRIST

## 2020-05-25 PROCEDURE — 3008F BODY MASS INDEX DOCD: CPT | Mod: CPTII,S$GLB,, | Performed by: PODIATRIST

## 2020-05-25 PROCEDURE — 3052F PR MOST RECENT HEMOGLOBIN A1C LEVEL 8.0 - < 9.0%: ICD-10-PCS | Mod: CPTII,S$GLB,, | Performed by: PODIATRIST

## 2020-05-25 PROCEDURE — 99999 PR PBB SHADOW E&M-EST. PATIENT-LVL III: ICD-10-PCS | Mod: PBBFAC,,, | Performed by: PODIATRIST

## 2020-05-25 NOTE — PATIENT INSTRUCTIONS
Wound has healed well with no signs of continued skin breakdown noted   Ok to transition into normal shoe gear at this time. Check feet daily for signs of drainage or lesion re-opening   Use of daily foot moisturizer to feet, avoiding the webspace's  Limit showers/bathing to roughly 15 minutes during the 1st few weeks after wound has healed to prevent skin breakdown       Recommend lotions: eucerin, eucerin for diabetics, aquaphor, A&D ointment, gold bond for diabetics, sween, [place]'s Bees all purpose baby ointment,  urea 40 with aloe (found on amazon.com)    Shoe recommendations: (try 6pm.abusix, zappos.abusix , nordstromrackMergeLocal, or shoes.abusix for discounted prices) you can visit [employer] shoes in [place]  or [employer] in the [facility] (there are also several shoe brand outlets in the [facility])    Asics (GT 2000 or gel foundations), new balance stability type shoes, saucony (stabil c3),  [place] (GTS or Beast or transcend), propet (tennis shoe)    Sofft Brand or axxiom (women) Reginald&Desmond (men), clarks, crocs, aerosoles, naturalizers, SAS, ecco, born, pauline buck, marina (dress shoes)    Vionic, burkenstocks, fitflops, propet (sandals)  Nike comfort thong sandals, crocs, propet (house shoes)    Nail Home remedy:  Vicks Vapor rub to nails for easier manageability    Diabetes: Inspecting Your Feet  Diabetes increases your chances of developing foot problems. So inspect your feet every day. This helps you find small skin irritations before they become serious infections. If you have trouble seeing the bottoms of your feet, use a mirror or ask a family member or friend to help.     Pressure spots on the bottom of the foot are common areas where problems develop.   How to check your feet  Below are tips to help you look for foot problems. Try to check your feet at the same time each day, such as when you get out of bed in the morning:  · Check the top of each foot. The tops of toes, back of the heel, and outer edge of the  foot can get a lot of rubbing from poor-fitting shoes.  · Check the bottom of each foot. Daily wear and tear often leads to problems at pressure spots.  · Check the toes and nails. Fungal infections often occur between toes. Toenail problems can also be a sign of fungal infections or lead to breaks in the skin.  · Check your shoes, too. Loose objects inside a shoe can injure the foot. Use your hand to feel inside your shoes for things like james, loose stitching, or rough areas that could irritate your skin.  Warning signs  Look for any color changes in the foot. Redness with streaks can signal a severe infection, which needs immediate medical attention. Tell your doctor right away if you have any of these problems:  · Swelling, sometimes with color changes, may be a sign of poor blood flow or infection. Symptoms include tenderness and an increase in the size of your foot.  · Warm or hot areas on your feet may be signs of infection. A foot that is cold may not be getting enough blood.  · Sensations such as burning, tingling, or pins and needles can be signs of a problem. Also check for areas that may be numb.  · Hot spots are caused by friction or pressure. Look for hot spots in areas that get a lot of rubbing. Hot spots can turn into blisters, calluses, or sores.  · Cracks and sores are caused by dry or irritated skin. They are a sign that the skin is breaking down, which can lead to infection.  · Toenail problems to watch for include nails growing into the skin (ingrown toenail) and causing redness or pain. Thick, yellow, or discolored nails can signal a fungal infection.  · Drainage and odor can develop from untreated sores and ulcers. Call your doctor right away if you notice white or yellow drainage, bleeding, or unpleasant odor.   © 6346-5342 [employer]. [address] [place] [address], [place] [address]. All rights reserved. This information is not intended as a substitute for professional medical  care. Always follow your healthcare professional's instructions.        Step-by-Step:  Inspecting Your Feet (Diabetes)    Date Last Reviewed: 10/1/2016  © 2980-7755 [employer]. [address] [place] [address], [place] [address]. All rights reserved. This information is not intended as a substitute for professional medical care. Always follow your healthcare professional's instructions.
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
## 2020-05-26 ENCOUNTER — PATIENT OUTREACH (OUTPATIENT)
Dept: ADMINISTRATIVE | Facility: HOSPITAL | Age: 55
End: 2020-05-26

## 2020-06-25 ENCOUNTER — PATIENT OUTREACH (OUTPATIENT)
Dept: ADMINISTRATIVE | Facility: OTHER | Age: 55
End: 2020-06-25

## 2020-06-25 NOTE — PROGRESS NOTES
Requested updates within Care Everywhere.  Patient's chart was reviewed for overdue VIRGILIO topics.  Immunizations reconciled.

## 2020-06-29 ENCOUNTER — PATIENT MESSAGE (OUTPATIENT)
Dept: PODIATRY | Facility: CLINIC | Age: 55
End: 2020-06-29

## 2020-06-29 DIAGNOSIS — E11.49 TYPE II DIABETES MELLITUS WITH NEUROLOGICAL MANIFESTATIONS: Primary | ICD-10-CM

## 2020-06-29 DIAGNOSIS — E08.621 DIABETIC ULCER OF LEFT MIDFOOT ASSOCIATED WITH DIABETES MELLITUS DUE TO UNDERLYING CONDITION, WITH FAT LAYER EXPOSED: ICD-10-CM

## 2020-06-29 DIAGNOSIS — L97.422 DIABETIC ULCER OF LEFT MIDFOOT ASSOCIATED WITH DIABETES MELLITUS DUE TO UNDERLYING CONDITION, WITH FAT LAYER EXPOSED: ICD-10-CM

## 2020-06-30 ENCOUNTER — TELEPHONE (OUTPATIENT)
Dept: PODIATRY | Facility: CLINIC | Age: 55
End: 2020-06-30

## 2020-06-30 DIAGNOSIS — L97.526 DIABETIC ULCER OF TOE OF LEFT FOOT ASSOCIATED WITH DIABETES MELLITUS DUE TO UNDERLYING CONDITION, WITH BONE INVOLVEMENT WITHOUT EVIDENCE OF NECROSIS: Primary | ICD-10-CM

## 2020-06-30 DIAGNOSIS — E08.621 DIABETIC ULCER OF TOE OF LEFT FOOT ASSOCIATED WITH DIABETES MELLITUS DUE TO UNDERLYING CONDITION, WITH BONE INVOLVEMENT WITHOUT EVIDENCE OF NECROSIS: Primary | ICD-10-CM

## 2020-06-30 RX ORDER — CIPROFLOXACIN 500 MG/1
500 TABLET ORAL 2 TIMES DAILY
Qty: 20 TABLET | Refills: 0 | Status: SHIPPED | OUTPATIENT
Start: 2020-06-30 | End: 2020-06-30

## 2020-06-30 RX ORDER — CIPROFLOXACIN 500 MG/1
500 TABLET ORAL 2 TIMES DAILY
Qty: 20 TABLET | Refills: 0 | Status: SHIPPED | OUTPATIENT
Start: 2020-06-30 | End: 2020-07-10

## 2020-07-15 ENCOUNTER — TELEPHONE (OUTPATIENT)
Dept: PODIATRY | Facility: CLINIC | Age: 55
End: 2020-07-15

## 2020-07-15 ENCOUNTER — PATIENT OUTREACH (OUTPATIENT)
Dept: ADMINISTRATIVE | Facility: OTHER | Age: 55
End: 2020-07-15

## 2020-07-15 DIAGNOSIS — Z12.11 COLON CANCER SCREENING: Primary | ICD-10-CM

## 2020-07-16 ENCOUNTER — TELEPHONE (OUTPATIENT)
Dept: PODIATRY | Facility: CLINIC | Age: 55
End: 2020-07-16

## 2020-07-16 ENCOUNTER — HOSPITAL ENCOUNTER (OUTPATIENT)
Dept: RADIOLOGY | Facility: HOSPITAL | Age: 55
Discharge: HOME | DRG: 628 | End: 2020-07-16
Attending: PODIATRIST
Payer: COMMERCIAL

## 2020-07-16 ENCOUNTER — PATIENT OUTREACH (OUTPATIENT)
Dept: ADMINISTRATIVE | Facility: HOSPITAL | Age: 55
End: 2020-07-16

## 2020-07-16 ENCOUNTER — OFFICE VISIT (OUTPATIENT)
Dept: PODIATRY | Facility: CLINIC | Age: 55
DRG: 628 | End: 2020-07-16
Payer: COMMERCIAL

## 2020-07-16 ENCOUNTER — HOSPITAL ENCOUNTER (INPATIENT)
Facility: HOSPITAL | Age: 55
LOS: 8 days | Discharge: HOME | DRG: 628 | End: 2020-07-24
Attending: EMERGENCY MEDICINE | Admitting: EMERGENCY MEDICINE
Payer: COMMERCIAL

## 2020-07-16 VITALS
HEART RATE: 84 BPM | HEIGHT: 73 IN | BODY MASS INDEX: 34.85 KG/M2 | SYSTOLIC BLOOD PRESSURE: 120 MMHG | DIASTOLIC BLOOD PRESSURE: 80 MMHG | WEIGHT: 263 LBS

## 2020-07-16 DIAGNOSIS — M86.172 ACUTE OSTEOMYELITIS OF METATARSAL BONE OF LEFT FOOT: ICD-10-CM

## 2020-07-16 DIAGNOSIS — M86.9 OSTEOMYELITIS OF LEFT FOOT, UNSPECIFIED TYPE: Primary | ICD-10-CM

## 2020-07-16 DIAGNOSIS — E87.1 HYPONATREMIA: ICD-10-CM

## 2020-07-16 DIAGNOSIS — L97.425 DIABETIC ULCER OF LEFT MIDFOOT ASSOCIATED WITH DIABETES MELLITUS DUE TO UNDERLYING CONDITION, WITH MUSCLE INVOLVEMENT WITHOUT EVIDENCE OF NECROSIS: ICD-10-CM

## 2020-07-16 DIAGNOSIS — I10 ESSENTIAL HYPERTENSION: ICD-10-CM

## 2020-07-16 DIAGNOSIS — E11.49 TYPE II DIABETES MELLITUS WITH NEUROLOGICAL MANIFESTATIONS: Primary | ICD-10-CM

## 2020-07-16 DIAGNOSIS — Z78.9 FAILURE OF OUTPATIENT TREATMENT: ICD-10-CM

## 2020-07-16 DIAGNOSIS — M86.179 OTHER ACUTE OSTEOMYELITIS, UNSPECIFIED ANKLE AND FOOT: ICD-10-CM

## 2020-07-16 DIAGNOSIS — N18.9 CHRONIC KIDNEY DISEASE, UNSPECIFIED CKD STAGE: ICD-10-CM

## 2020-07-16 DIAGNOSIS — M86.672 OTHER CHRONIC OSTEOMYELITIS OF LEFT FOOT: ICD-10-CM

## 2020-07-16 DIAGNOSIS — U07.1 COVID-19 VIRUS DETECTED: ICD-10-CM

## 2020-07-16 DIAGNOSIS — N18.3 ACUTE RENAL FAILURE SUPERIMPOSED ON STAGE 3 CHRONIC KIDNEY DISEASE, UNSPECIFIED ACUTE RENAL FAILURE TYPE: ICD-10-CM

## 2020-07-16 DIAGNOSIS — E08.621 DIABETIC ULCER OF LEFT MIDFOOT ASSOCIATED WITH DIABETES MELLITUS DUE TO UNDERLYING CONDITION, WITH MUSCLE INVOLVEMENT WITHOUT EVIDENCE OF NECROSIS: ICD-10-CM

## 2020-07-16 DIAGNOSIS — N17.9 ACUTE RENAL FAILURE SUPERIMPOSED ON STAGE 3 CHRONIC KIDNEY DISEASE, UNSPECIFIED ACUTE RENAL FAILURE TYPE: ICD-10-CM

## 2020-07-16 DIAGNOSIS — E11.49 TYPE II DIABETES MELLITUS WITH NEUROLOGICAL MANIFESTATIONS: ICD-10-CM

## 2020-07-16 DIAGNOSIS — U07.1 COVID-19 VIRUS INFECTION: ICD-10-CM

## 2020-07-16 PROBLEM — N18.30 ACUTE RENAL FAILURE SUPERIMPOSED ON STAGE 3 CHRONIC KIDNEY DISEASE: Status: ACTIVE | Noted: 2020-07-16

## 2020-07-16 PROBLEM — L97.529 DIABETIC ULCER OF TOE OF LEFT FOOT: Status: ACTIVE | Noted: 2020-07-16

## 2020-07-16 PROBLEM — E11.621 DIABETIC ULCER OF TOE OF LEFT FOOT: Status: ACTIVE | Noted: 2020-07-16

## 2020-07-16 PROBLEM — Z01.818 PRE-OP EXAM: Status: ACTIVE | Noted: 2020-07-16

## 2020-07-16 LAB
ALBUMIN SERPL BCP-MCNC: 2.9 G/DL (ref 3.5–5.2)
ALP SERPL-CCNC: 305 U/L (ref 55–135)
ALT SERPL W/O P-5'-P-CCNC: 33 U/L (ref 10–44)
ANION GAP SERPL CALC-SCNC: 11 MMOL/L (ref 8–16)
AST SERPL-CCNC: 38 U/L (ref 10–40)
BACTERIA #/AREA URNS HPF: NORMAL /HPF
BASOPHILS # BLD AUTO: 0.01 K/UL (ref 0–0.2)
BASOPHILS NFR BLD: 0.1 % (ref 0–1.9)
BILIRUB SERPL-MCNC: 0.7 MG/DL (ref 0.1–1)
BILIRUB UR QL STRIP: NEGATIVE
BNP SERPL-MCNC: 30 PG/ML (ref 0–99)
BUN SERPL-MCNC: 54 MG/DL (ref 6–20)
CALCIUM SERPL-MCNC: 8.7 MG/DL (ref 8.7–10.5)
CHLORIDE SERPL-SCNC: 97 MMOL/L (ref 95–110)
CLARITY UR: CLEAR
CO2 SERPL-SCNC: 20 MMOL/L (ref 23–29)
COLOR UR: YELLOW
CREAT SERPL-MCNC: 3.8 MG/DL (ref 0.5–1.4)
CRP SERPL-MCNC: 233 MG/L (ref 0–8.2)
D DIMER PPP IA.FEU-MCNC: 1.05 MG/L FEU
DIFFERENTIAL METHOD: ABNORMAL
EOSINOPHIL # BLD AUTO: 0 K/UL (ref 0–0.5)
EOSINOPHIL NFR BLD: 0.3 % (ref 0–8)
ERYTHROCYTE [DISTWIDTH] IN BLOOD BY AUTOMATED COUNT: 12.3 % (ref 11.5–14.5)
ERYTHROCYTE [SEDIMENTATION RATE] IN BLOOD BY WESTERGREN METHOD: 103 MM/HR (ref 0–10)
EST. GFR  (AFRICAN AMERICAN): 19 ML/MIN/1.73 M^2
EST. GFR  (NON AFRICAN AMERICAN): 17 ML/MIN/1.73 M^2
FERRITIN SERPL-MCNC: 485 NG/ML (ref 20–300)
GLUCOSE SERPL-MCNC: 117 MG/DL (ref 70–110)
GLUCOSE UR QL STRIP: NEGATIVE
HCT VFR BLD AUTO: 36.5 % (ref 40–54)
HGB BLD-MCNC: 11.8 G/DL (ref 14–18)
HGB UR QL STRIP: ABNORMAL
HYALINE CASTS #/AREA URNS LPF: 0 /LPF
IMM GRANULOCYTES # BLD AUTO: 0.04 K/UL (ref 0–0.04)
IMM GRANULOCYTES NFR BLD AUTO: 0.4 % (ref 0–0.5)
KETONES UR QL STRIP: NEGATIVE
LACTATE SERPL-SCNC: 0.8 MMOL/L (ref 0.5–2.2)
LDH SERPL L TO P-CCNC: 296 U/L (ref 110–260)
LEUKOCYTE ESTERASE UR QL STRIP: NEGATIVE
LYMPHOCYTES # BLD AUTO: 0.8 K/UL (ref 1–4.8)
LYMPHOCYTES NFR BLD: 8.7 % (ref 18–48)
MCH RBC QN AUTO: 26.6 PG (ref 27–31)
MCHC RBC AUTO-ENTMCNC: 32.3 G/DL (ref 32–36)
MCV RBC AUTO: 82 FL (ref 82–98)
MICROSCOPIC COMMENT: NORMAL
MONOCYTES # BLD AUTO: 0.9 K/UL (ref 0.3–1)
MONOCYTES NFR BLD: 10.2 % (ref 4–15)
NEUTROPHILS # BLD AUTO: 7.1 K/UL (ref 1.8–7.7)
NEUTROPHILS NFR BLD: 80.3 % (ref 38–73)
NITRITE UR QL STRIP: NEGATIVE
NRBC BLD-RTO: 0 /100 WBC
PH UR STRIP: 5 [PH] (ref 5–8)
PLATELET # BLD AUTO: 213 K/UL (ref 150–350)
PMV BLD AUTO: 9.8 FL (ref 9.2–12.9)
POCT GLUCOSE: 127 MG/DL (ref 70–110)
POTASSIUM SERPL-SCNC: 5.1 MMOL/L (ref 3.5–5.1)
PROCALCITONIN SERPL IA-MCNC: 2.51 NG/ML
PROT SERPL-MCNC: 8.2 G/DL (ref 6–8.4)
PROT UR QL STRIP: ABNORMAL
RBC # BLD AUTO: 4.43 M/UL (ref 4.6–6.2)
RBC #/AREA URNS HPF: 3 /HPF (ref 0–4)
SARS-COV-2 RDRP RESP QL NAA+PROBE: POSITIVE
SODIUM SERPL-SCNC: 128 MMOL/L (ref 136–145)
SODIUM UR-SCNC: 32 MMOL/L (ref 20–250)
SP GR UR STRIP: 1.01 (ref 1–1.03)
SQUAMOUS #/AREA URNS HPF: 2 /HPF
URN SPEC COLLECT METH UR: ABNORMAL
UROBILINOGEN UR STRIP-ACNC: NEGATIVE EU/DL
WBC # BLD AUTO: 8.89 K/UL (ref 3.9–12.7)
WBC #/AREA URNS HPF: 1 /HPF (ref 0–5)

## 2020-07-16 PROCEDURE — 80053 COMPREHEN METABOLIC PANEL: CPT

## 2020-07-16 PROCEDURE — 99291 CRITICAL CARE FIRST HOUR: CPT | Mod: 25

## 2020-07-16 PROCEDURE — 73630 XR FOOT COMPLETE 3 VIEW LEFT: ICD-10-PCS | Mod: 26,LT,, | Performed by: RADIOLOGY

## 2020-07-16 PROCEDURE — 3074F SYST BP LT 130 MM HG: CPT | Mod: CPTII,S$GLB,, | Performed by: PODIATRIST

## 2020-07-16 PROCEDURE — 93010 EKG 12-LEAD: ICD-10-PCS | Mod: ,,, | Performed by: INTERNAL MEDICINE

## 2020-07-16 PROCEDURE — 99999 PR PBB SHADOW E&M-EST. PATIENT-LVL IV: CPT | Mod: PBBFAC,,, | Performed by: PODIATRIST

## 2020-07-16 PROCEDURE — 3052F PR MOST RECENT HEMOGLOBIN A1C LEVEL 8.0 - < 9.0%: ICD-10-PCS | Mod: CPTII,S$GLB,, | Performed by: PODIATRIST

## 2020-07-16 PROCEDURE — 87075 CULTR BACTERIA EXCEPT BLOOD: CPT

## 2020-07-16 PROCEDURE — 73630 X-RAY EXAM OF FOOT: CPT | Mod: 26,LT,, | Performed by: RADIOLOGY

## 2020-07-16 PROCEDURE — 25000242 PHARM REV CODE 250 ALT 637 W/ HCPCS: Performed by: HOSPITALIST

## 2020-07-16 PROCEDURE — 73630 X-RAY EXAM OF FOOT: CPT | Mod: TC,FY,PO,LT

## 2020-07-16 PROCEDURE — 85652 RBC SED RATE AUTOMATED: CPT

## 2020-07-16 PROCEDURE — 3008F PR BODY MASS INDEX (BMI) DOCUMENTED: ICD-10-PCS | Mod: CPTII,S$GLB,, | Performed by: PODIATRIST

## 2020-07-16 PROCEDURE — 87070 CULTURE OTHR SPECIMN AEROBIC: CPT

## 2020-07-16 PROCEDURE — U0002 COVID-19 LAB TEST NON-CDC: HCPCS

## 2020-07-16 PROCEDURE — 99215 PR OFFICE/OUTPT VISIT, EST, LEVL V, 40-54 MIN: ICD-10-PCS | Mod: S$GLB,,, | Performed by: PODIATRIST

## 2020-07-16 PROCEDURE — 83880 ASSAY OF NATRIURETIC PEPTIDE: CPT

## 2020-07-16 PROCEDURE — 86140 C-REACTIVE PROTEIN: CPT

## 2020-07-16 PROCEDURE — 25000003 PHARM REV CODE 250: Performed by: HOSPITALIST

## 2020-07-16 PROCEDURE — 99999 PR PBB SHADOW E&M-EST. PATIENT-LVL IV: ICD-10-PCS | Mod: PBBFAC,,, | Performed by: PODIATRIST

## 2020-07-16 PROCEDURE — 93005 ELECTROCARDIOGRAM TRACING: CPT

## 2020-07-16 PROCEDURE — 3052F HG A1C>EQUAL 8.0%<EQUAL 9.0%: CPT | Mod: CPTII,S$GLB,, | Performed by: PODIATRIST

## 2020-07-16 PROCEDURE — 83930 ASSAY OF BLOOD OSMOLALITY: CPT

## 2020-07-16 PROCEDURE — 83615 LACTATE (LD) (LDH) ENZYME: CPT

## 2020-07-16 PROCEDURE — 3074F PR MOST RECENT SYSTOLIC BLOOD PRESSURE < 130 MM HG: ICD-10-PCS | Mod: CPTII,S$GLB,, | Performed by: PODIATRIST

## 2020-07-16 PROCEDURE — 87040 BLOOD CULTURE FOR BACTERIA: CPT

## 2020-07-16 PROCEDURE — 87076 CULTURE ANAEROBE IDENT EACH: CPT | Mod: 59

## 2020-07-16 PROCEDURE — 83605 ASSAY OF LACTIC ACID: CPT

## 2020-07-16 PROCEDURE — 82728 ASSAY OF FERRITIN: CPT

## 2020-07-16 PROCEDURE — 63600175 PHARM REV CODE 636 W HCPCS: Performed by: EMERGENCY MEDICINE

## 2020-07-16 PROCEDURE — 87186 SC STD MICRODIL/AGAR DIL: CPT

## 2020-07-16 PROCEDURE — 3079F DIAST BP 80-89 MM HG: CPT | Mod: CPTII,S$GLB,, | Performed by: PODIATRIST

## 2020-07-16 PROCEDURE — 85379 FIBRIN DEGRADATION QUANT: CPT

## 2020-07-16 PROCEDURE — 36415 COLL VENOUS BLD VENIPUNCTURE: CPT

## 2020-07-16 PROCEDURE — 99215 OFFICE O/P EST HI 40 MIN: CPT | Mod: S$GLB,,, | Performed by: PODIATRIST

## 2020-07-16 PROCEDURE — 81000 URINALYSIS NONAUTO W/SCOPE: CPT

## 2020-07-16 PROCEDURE — 3008F BODY MASS INDEX DOCD: CPT | Mod: CPTII,S$GLB,, | Performed by: PODIATRIST

## 2020-07-16 PROCEDURE — 87077 CULTURE AEROBIC IDENTIFY: CPT

## 2020-07-16 PROCEDURE — 83935 ASSAY OF URINE OSMOLALITY: CPT

## 2020-07-16 PROCEDURE — 11000001 HC ACUTE MED/SURG PRIVATE ROOM

## 2020-07-16 PROCEDURE — 85025 COMPLETE CBC W/AUTO DIFF WBC: CPT

## 2020-07-16 PROCEDURE — 25000003 PHARM REV CODE 250: Performed by: EMERGENCY MEDICINE

## 2020-07-16 PROCEDURE — 93010 ELECTROCARDIOGRAM REPORT: CPT | Mod: ,,, | Performed by: INTERNAL MEDICINE

## 2020-07-16 PROCEDURE — 82962 GLUCOSE BLOOD TEST: CPT

## 2020-07-16 PROCEDURE — 3079F PR MOST RECENT DIASTOLIC BLOOD PRESSURE 80-89 MM HG: ICD-10-PCS | Mod: CPTII,S$GLB,, | Performed by: PODIATRIST

## 2020-07-16 PROCEDURE — 84145 PROCALCITONIN (PCT): CPT

## 2020-07-16 PROCEDURE — 84300 ASSAY OF URINE SODIUM: CPT

## 2020-07-16 RX ORDER — AMLODIPINE BESYLATE 5 MG/1
10 TABLET ORAL DAILY
Status: DISCONTINUED | OUTPATIENT
Start: 2020-07-17 | End: 2020-07-24 | Stop reason: HOSPADM

## 2020-07-16 RX ORDER — INSULIN ASPART 100 [IU]/ML
0-5 INJECTION, SOLUTION INTRAVENOUS; SUBCUTANEOUS
Status: DISCONTINUED | OUTPATIENT
Start: 2020-07-16 | End: 2020-07-19

## 2020-07-16 RX ORDER — DILTIAZEM HYDROCHLORIDE 120 MG/1
240 CAPSULE, COATED, EXTENDED RELEASE ORAL DAILY
Status: DISCONTINUED | OUTPATIENT
Start: 2020-07-17 | End: 2020-07-16

## 2020-07-16 RX ORDER — HYDRALAZINE HYDROCHLORIDE 20 MG/ML
15 INJECTION INTRAMUSCULAR; INTRAVENOUS EVERY 4 HOURS PRN
Status: DISCONTINUED | OUTPATIENT
Start: 2020-07-16 | End: 2020-07-24 | Stop reason: HOSPADM

## 2020-07-16 RX ORDER — HEPARIN SODIUM 5000 [USP'U]/ML
5000 INJECTION, SOLUTION INTRAVENOUS; SUBCUTANEOUS EVERY 8 HOURS
Status: DISCONTINUED | OUTPATIENT
Start: 2020-07-16 | End: 2020-07-24 | Stop reason: HOSPADM

## 2020-07-16 RX ORDER — IBUPROFEN 200 MG
16 TABLET ORAL
Status: DISCONTINUED | OUTPATIENT
Start: 2020-07-16 | End: 2020-07-24 | Stop reason: HOSPADM

## 2020-07-16 RX ORDER — GLUCAGON 1 MG
1 KIT INJECTION
Status: DISCONTINUED | OUTPATIENT
Start: 2020-07-16 | End: 2020-07-24 | Stop reason: HOSPADM

## 2020-07-16 RX ORDER — ATORVASTATIN CALCIUM 40 MG/1
40 TABLET, FILM COATED ORAL DAILY
Status: DISCONTINUED | OUTPATIENT
Start: 2020-07-17 | End: 2020-07-24 | Stop reason: HOSPADM

## 2020-07-16 RX ORDER — IBUPROFEN 200 MG
24 TABLET ORAL
Status: DISCONTINUED | OUTPATIENT
Start: 2020-07-16 | End: 2020-07-24 | Stop reason: HOSPADM

## 2020-07-16 RX ORDER — ALBUTEROL SULFATE 90 UG/1
2 AEROSOL, METERED RESPIRATORY (INHALATION) EVERY 6 HOURS
Status: DISCONTINUED | OUTPATIENT
Start: 2020-07-16 | End: 2020-07-20

## 2020-07-16 RX ORDER — ONDANSETRON 8 MG/1
8 TABLET, ORALLY DISINTEGRATING ORAL EVERY 8 HOURS PRN
Status: DISCONTINUED | OUTPATIENT
Start: 2020-07-16 | End: 2020-07-24 | Stop reason: HOSPADM

## 2020-07-16 RX ORDER — SODIUM CHLORIDE 0.9 % (FLUSH) 0.9 %
10 SYRINGE (ML) INJECTION
Status: DISCONTINUED | OUTPATIENT
Start: 2020-07-16 | End: 2020-07-24 | Stop reason: HOSPADM

## 2020-07-16 RX ORDER — BENZONATATE 100 MG/1
100 CAPSULE ORAL 3 TIMES DAILY PRN
Status: DISCONTINUED | OUTPATIENT
Start: 2020-07-16 | End: 2020-07-24 | Stop reason: HOSPADM

## 2020-07-16 RX ORDER — ACETAMINOPHEN 325 MG/1
650 TABLET ORAL EVERY 4 HOURS PRN
Status: DISCONTINUED | OUTPATIENT
Start: 2020-07-16 | End: 2020-07-24 | Stop reason: HOSPADM

## 2020-07-16 RX ORDER — ASCORBIC ACID 500 MG
500 TABLET ORAL 2 TIMES DAILY
Status: DISCONTINUED | OUTPATIENT
Start: 2020-07-16 | End: 2020-07-24 | Stop reason: HOSPADM

## 2020-07-16 RX ADMIN — ALBUTEROL SULFATE 2 PUFF: 90 AEROSOL, METERED RESPIRATORY (INHALATION) at 07:07

## 2020-07-16 RX ADMIN — ACETAMINOPHEN 650 MG: 325 TABLET ORAL at 08:07

## 2020-07-16 RX ADMIN — VANCOMYCIN HYDROCHLORIDE 2000 MG: 10 INJECTION, POWDER, LYOPHILIZED, FOR SOLUTION INTRAVENOUS at 05:07

## 2020-07-16 RX ADMIN — OXYCODONE HYDROCHLORIDE AND ACETAMINOPHEN 500 MG: 500 TABLET ORAL at 08:07

## 2020-07-16 NOTE — ASSESSMENT & PLAN NOTE
-Na is 128 on admit and he has no symptoms.  -He is not hypovolemic.  If anything, very mildly hypervolemic  -Notes he drinks 1gallon of water daily, at least  -Will check urine osm, serum osm and urine Na  -Will ask him not to drink so much water  -Repeat labs q48.

## 2020-07-16 NOTE — HPI
Mr. Mcfadden is a 55 year old man with diabetes mellitus, diabetic foot ulcers, hypertension, hyperlipidemia and CKDIII who was found to have osteomyelitis on outside imaging and sent in by his podiatrist, Dr. Martinez.  He states that he has had ulcers on his left foot for quite some time and they had been stable.  Then 2.5 weeks ago he noted a blister on his left foot which subsequently popped.  After discussing with his podiatrist he was started on cipro.  At the time, he was in [place], [place] because his father was ill and in the hospital.  His father passed away with complications from covid-19 infection.  Because he was out of town attending to his father's illness, he was unable to follow up with Dr. Martinez until today.  Xray of his foot was obtained and showed osteomyelitis of his left 5th toe.  He noted a malodorous drainage and a mild achey pain.  He denied fevers, chills, nausea, vomiting, diarrhea, lethargy, malaise, shortness of breath or cough.  He notes he drinks at least a gallon of water daily.

## 2020-07-16 NOTE — H&P
[facility] Medicine  History & Physical    Patient Name: Catalino Mcfadden  MRN: [id number]  Admission Date: 7/16/2020  Attending Physician: Vikas Reyes MD  Primary Care Provider: Azikiwe K Lombard, MD         Patient information was obtained from patient, past medical records and ER records.     Subjective:     Principal Problem:Osteomyelitis of left foot    Chief Complaint:   Chief Complaint   Patient presents with    Wound Infection     pt c/o pain to the left foot secondary to a blister that has got infected. Pt personal Physician requested patient go to ED for assessment. patient has a foot boot on.         HPI: Mr. Mcfadden is a 55 year old man with history of diabetes mellitus, diabetic foot ulcers, hypertension, hyperlipidemia and CKDIII who was found to have osteomyelitis on outside imaging and sent in by his podiatrist, Dr. Martinez.  He states that he has had ulcers on his left foot for quite some time and they had been stable.  Then 2.5 weeks ago he noted a blister on his left foot which subsequently popped.  After discussing with his podiatrist he was started on cipro.  At the time, he was in [place], [place] because his father was ill and in the hospital.  His father passed away with complications from covid-19 infection.  Because he was out of town attending to his father's illness, he was unable to follow up with Dr. Martinez until today.  Xray of his foot was obtained and showed osteomyelitis of his left 5th toe.  He noted a malodorous drainage and a mild achey pain.  He denied fevers, chills, nausea, vomiting, diarrhea, lethargy, malaise, shortness of breath or cough.  He notes he drinks at least a gallon of water daily.    Past Medical History:   Diagnosis Date    CKD (chronic kidney disease), stage III 07/16/2020              Diabetes mellitus, type 2     Diabetic foot ulcer associated with diabetes mellitus due to underlying condition 07/16/2020         Hyperlipidemia      Hypertension        Past Surgical History:   Procedure Laterality Date    CERVICAL DISC SURGERY  07/11/2016    left leg orthopedic surgery Left        Review of patient's allergies indicates:   Allergen Reactions    Morphine Hallucinations       No current facility-administered medications on file prior to encounter.      Current Outpatient Medications on File Prior to Encounter   Medication Sig    amLODIPine (NORVASC) 10 MG tablet Take 1 tablet (10 mg total) by mouth once daily.    atorvastatin (LIPITOR) 40 MG tablet Take 1 tablet (40 mg total) by mouth once daily.    blood sugar diagnostic Strp 1 strip by Misc.(Non-Drug; Combo Route) route 3 (three) times daily. Patient needs One Touch Test Strips (Berio).    chlorthalidone (HYGROTEN) 25 MG Tab TAKE 1 TABLET(25 MG) BY MOUTH EVERY DAY    diltiaZEM (CARDIZEM CD) 240 MG 24 hr capsule TAKE 1 CAPSULE BY MOUTH EVERY DAY    diltiaZEM (CARDIZEM CD) 360 MG 24 hr capsule Take 1 capsule (360 mg total) by mouth once daily.    lisinopriL (PRINIVIL,ZESTRIL) 20 MG tablet TAKE 1 TABLET(20 MG) BY MOUTH EVERY DAY    TRULICITY 0.75 mg/0.5 mL PnIj INJECT 0.5 ML UNDER THE SKIN EVERY 7 DAYS     Family History     Problem Relation (Age of Onset)    Heart disease Father        Tobacco Use    Smoking status: Never Smoker    Smokeless tobacco: Never Used   Substance and Sexual Activity    Alcohol use: Not Currently     Alcohol/week: 0.0 standard drinks     Comment: social    Drug use: No    Sexual activity: Not on file     Review of Systems   Constitutional: Negative for activity change, appetite change and fever.   HENT: Negative for congestion and dental problem.    Eyes: Negative for discharge and itching.   Respiratory: Negative for apnea, cough, chest tightness and shortness of breath.    Cardiovascular: Negative for chest pain and leg swelling.   Gastrointestinal: Negative for abdominal distention and abdominal pain.   Endocrine: Negative for cold intolerance and heat  intolerance.   Genitourinary: Negative for difficulty urinating and dysuria.   Musculoskeletal: Negative for arthralgias and back pain.   Skin: Positive for wound.   Allergic/Immunologic: Negative for environmental allergies and food allergies.   Neurological: Negative for dizziness, facial asymmetry and light-headedness.   Hematological: Negative for adenopathy. Does not bruise/bleed easily.   Psychiatric/Behavioral: Negative for agitation and behavioral problems.     Objective:     Vital Signs (Most Recent):  Temp: 98.1 °F (36.7 °C) (07/16/20 1433)  Pulse: 94 (07/16/20 1433)  Resp: 18 (07/16/20 1433)  BP: 139/79 (07/16/20 1433)  SpO2: 97 % (07/16/20 1433) Vital Signs (24h Range):  Temp:  [98.1 °F (36.7 °C)] 98.1 °F (36.7 °C)  Pulse:  [84-94] 94  Resp:  [18] 18  SpO2:  [97 %] 97 %  BP: (120-139)/(79-80) 139/79     Weight: 113.4 kg (250 lb)  Body mass index is 32.98 kg/m².    Physical Exam  Vitals signs reviewed.   Constitutional:       General: He is not in acute distress.     Appearance: He is well-developed. He is not ill-appearing, toxic-appearing or diaphoretic.   HENT:      Head: Normocephalic and atraumatic.      Mouth/Throat:      Mouth: Mucous membranes are moist.   Eyes:      Extraocular Movements: Extraocular movements intact.      Pupils: Pupils are equal, round, and reactive to light.   Neck:      Musculoskeletal: Normal range of motion and neck supple.   Cardiovascular:      Rate and Rhythm: Normal rate and regular rhythm.      Heart sounds: Normal heart sounds.   Pulmonary:      Effort: Pulmonary effort is normal. No respiratory distress.      Breath sounds: Normal breath sounds.   Abdominal:      General: Bowel sounds are normal. There is no distension.      Palpations: Abdomen is soft.      Tenderness: There is no abdominal tenderness.   Musculoskeletal: Normal range of motion.   Skin:     General: Skin is warm and dry.      Comments: Chronic veno-stasis changes to both shins with mild non-pitting  edema.  Left foot is bandaged but noted to have foul smelling drainage, diffuse warmth and edema and packing in the wound on his foot.  Images reviewed in computer.   Neurological:      Mental Status: He is alert and oriented to person, place, and time.      Cranial Nerves: No cranial nerve deficit.      Coordination: Coordination normal.   Psychiatric:         Behavior: Behavior normal.           CRANIAL NERVES     CN III, IV, VI   Pupils are equal, round, and reactive to light.       Significant Labs: All pertinent labs within the past 24 hours have been reviewed.    Significant Imaging: I have reviewed and interpreted all pertinent imaging results/findings within the past 24 hours.    Assessment/Plan:     * Osteomyelitis of left foot  -Mr. Mcfadden is admitted to inpatient status  -He had a diabetic foot wound that has failed outpatient treatment with cipro and radiographic evidence of osteomyelitis  -He is afebrile with normal wbc and CRP elevated at 233.  -Case discussed with Dr. Martinez, and her partner Dr. Whitmore will be on tomorrow and will plan for evaluation in OR with at minimal excisional debridement and bone biopsy/culture.  -If he stays non-toxic and afebrile, I will hold off on antibiotics until after bone culture obtained to maximize yield of the culture.  If he becomes febrile will start vancomycin.  -Will check arterial US of legs with BLANK to assess his circulation.  Presently has pulses and feet are warm.      Diabetic ulcer of toe of left foot  -Treatment as above      COVID-19 virus infection  -His father just passed away from covid infection and he has tested positive in the ER  -He is afebrile without any symptoms at this time.  O2 sats are normal on admit  -Will check d-dimer, ferritin and crp q48 hours  -Will keep in covid isolation  -Will treat with vitamin C and multivitamin  -If O2sats drop below 94% will start supplemental O2 and dexamethasone.      Acute renal failure superimposed on stage 3  chronic kidney disease  -Baseline Cr is 2.8-3.1 and on admit Cr is mildly above baseline at 3.8.  -Could be due to mild diminished appetite vs burgeoning infection.  -Will hold acei and chlorthalidone  -Renally dose medications and avoid nephrotoxic agents  -Check BNP.  -repeat labs and if any deterioration will consult nephrology.      Hyponatremia  -Na is 128 on admit and he has no symptoms.  -He is not hypovolemic.  If anything, very mildly hypervolemic  -Notes he drinks 1gallon of water daily, at least  -Will check urine osm, serum osm and urine Na  -Will ask him not to drink so much water  -Repeat labs q48.      Type 2 diabetes mellitus with foot ulcer, without long-term current use of insulin  -Check A1c  -At home takes trulicity weekly  -Hold that for now and provide diabetic diet with insulin sliding scale ac/hs      Hyperlipidemia, acquired  -Continue home statin      Essential hypertension  -BP is normal on admit  -At home takes norvasc, diltiazem, lisinopril and chlorthalidone  -Odd to take two calcium channel blockers.  -Will continue norvasc for now, but the others  -Will order prn hydralazine.        VTE Risk Mitigation (From admission, onward)         Ordered     heparin (porcine) injection 5,000 Units  Every 8 hours      07/16/20 1727     IP VTE HIGH RISK PATIENT  Once      07/16/20 1727     Place sequential compression device  Until discontinued      07/16/20 1727                   Vikas Reyes MD  Department of Hospital Medicine   [facility] [place]

## 2020-07-16 NOTE — ASSESSMENT & PLAN NOTE
-BP is normal on admit  -At home takes norvasc, diltiazem, lisinopril and chlorthalidone  -Odd to take two calcium channel blockers.  -Will continue norvasc for now, but the others  -Will order prn hydralazine.

## 2020-07-16 NOTE — PROGRESS NOTES
Pharmacokinetic Initial Assessment: IV Vancomycin    Assessment/Plan:    Initiate intravenous vancomycin with loading dose of 2000 mg once with subsequent doses when random concentrations are less than 20 mcg/mL  Desired empiric serum trough concentration is 15 to 20 mcg/mL  Draw vancomycin random level on 7/17/20 at 06:00.  Pharmacy will continue to follow and monitor vancomycin.      Please contact pharmacy at extension [phone] with any questions regarding this assessment.     Thank you for the consult,   Esmer Kaye       Patient brief summary:  Catalino Mcfadden is a 55 y.o. male initiated on antimicrobial therapy with IV Vancomycin for treatment of suspected bone/joint infection    Drug Allergies:   Review of patient's allergies indicates:   Allergen Reactions    Morphine Hallucinations       Actual Body Weight:   113.4 kg    Renal Function:   Estimated Creatinine Clearance: 28.6 mL/min (A) (based on SCr of 3.8 mg/dL (H)).,     Dialysis Method (if applicable):  N/A    CBC (last 72 hours):  Recent Labs   Lab Result Units 07/16/20  1607   WBC K/uL 8.89   Hemoglobin g/dL 11.8*   Hematocrit % 36.5*   Platelets K/uL 213   Gran% % 80.3*   Lymph% % 8.7*   Mono% % 10.2   Eosinophil% % 0.3   Basophil% % 0.1   Differential Method  Automated       Metabolic Panel (last 72 hours):  Recent Labs   Lab Result Units 07/16/20  1607   Sodium mmol/L 128*   Potassium mmol/L 5.1   Chloride mmol/L 97   CO2 mmol/L 20*   Glucose mg/dL 117*   BUN, Bld mg/dL 54*   Creatinine mg/dL 3.8*   Albumin g/dL 2.9*   Total Bilirubin mg/dL 0.7   Alkaline Phosphatase U/L 305*   AST U/L 38   ALT U/L 33       Drug levels (last 3 results):  No results for input(s): VANCOMYCINRA, VANCOMYCINPE, VANCOMYCINTR in the last 72 hours.    Microbiologic Results:  Microbiology Results (last 7 days)       Procedure Component Value Units Date/Time    Blood Culture #2 **CANNOT BE ORDERED STAT** [[id number]] Collected: 07/16/20 1722    Order Status: Sent Specimen:  Blood from Peripheral, Antecubital, Left Updated: 07/16/20 1735    Blood Culture #1 **CANNOT BE ORDERED STAT** [[id number]] Collected: 07/16/20 1717    Order Status: Sent Specimen: Blood from Peripheral, Hand, Right Updated: 07/16/20 1735

## 2020-07-16 NOTE — ED TRIAGE NOTES
Pt presents to the ED with c/o blister that got infected on side of left foot. Patient states he saw his primary care provider today and she told him to go to the ER. Pt denies taking meds PTA. No acute distress noted.
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
## 2020-07-16 NOTE — ASSESSMENT & PLAN NOTE
-His father just passed away from covid infection and he has tested positive in the ER  -He is afebrile without any symptoms at this time.  O2 sats are normal on admit  -Will check d-dimer, ferritin and crp q48 hours  -Will keep in covid isolation  -Will treat with vitamin C and multivitamin  -If O2sats drop below 94% will start supplemental O2 and dexamethasone.

## 2020-07-16 NOTE — ASSESSMENT & PLAN NOTE
-Mr. Mcfadden is admitted to inpatient status  -He had a diabetic foot wound that has failed outpatient treatment with cipro and radiographic evidence of osteomyelitis  -He is afebrile with normal wbc and CRP elevated at 233.  -Case discussed with Dr. Martinez, and her partner Dr. Whitmore will be on tomorrow and will plan for evaluation in OR with at minimal excisional debridement and bone biopsy/culture.  -If he stays non-toxic and afebrile, I will hold off on antibiotics until after bone culture obtained to maximize yield of the culture.  If he becomes febrile will start vancomycin.  -Will check arterial US of legs with BLANK to assess his circulation.  Presently has pulses and feet are warm.

## 2020-07-16 NOTE — SUBJECTIVE & OBJECTIVE
Past Medical History:   Diagnosis Date    CKD (chronic kidney disease), stage III 07/16/2020              Diabetes mellitus, type 2     Diabetic foot ulcer associated with diabetes mellitus due to underlying condition 07/16/2020         Hyperlipidemia     Hypertension        Past Surgical History:   Procedure Laterality Date    CERVICAL DISC SURGERY  07/11/2016    left leg orthopedic surgery Left        Review of patient's allergies indicates:   Allergen Reactions    Morphine Hallucinations       No current facility-administered medications on file prior to encounter.      Current Outpatient Medications on File Prior to Encounter   Medication Sig    amLODIPine (NORVASC) 10 MG tablet Take 1 tablet (10 mg total) by mouth once daily.    atorvastatin (LIPITOR) 40 MG tablet Take 1 tablet (40 mg total) by mouth once daily.    blood sugar diagnostic Strp 1 strip by Misc.(Non-Drug; Combo Route) route 3 (three) times daily. Patient needs One Touch Test Strips (Berio).    chlorthalidone (HYGROTEN) 25 MG Tab TAKE 1 TABLET(25 MG) BY MOUTH EVERY DAY    diltiaZEM (CARDIZEM CD) 240 MG 24 hr capsule TAKE 1 CAPSULE BY MOUTH EVERY DAY    diltiaZEM (CARDIZEM CD) 360 MG 24 hr capsule Take 1 capsule (360 mg total) by mouth once daily.    lisinopriL (PRINIVIL,ZESTRIL) 20 MG tablet TAKE 1 TABLET(20 MG) BY MOUTH EVERY DAY    TRULICITY 0.75 mg/0.5 mL PnIj INJECT 0.5 ML UNDER THE SKIN EVERY 7 DAYS     Family History     Problem Relation (Age of Onset)    Heart disease Father        Tobacco Use    Smoking status: Never Smoker    Smokeless tobacco: Never Used   Substance and Sexual Activity    Alcohol use: Not Currently     Alcohol/week: 0.0 standard drinks     Comment: social    Drug use: No    Sexual activity: Not on file     Review of Systems   Constitutional: Negative for activity change, appetite change and fever.   HENT: Negative for congestion and dental problem.    Eyes: Negative for discharge and itching.    Respiratory: Negative for apnea, cough, chest tightness and shortness of breath.    Cardiovascular: Negative for chest pain and leg swelling.   Gastrointestinal: Negative for abdominal distention and abdominal pain.   Endocrine: Negative for cold intolerance and heat intolerance.   Genitourinary: Negative for difficulty urinating and dysuria.   Musculoskeletal: Negative for arthralgias and back pain.   Skin: Positive for wound.   Allergic/Immunologic: Negative for environmental allergies and food allergies.   Neurological: Negative for dizziness, facial asymmetry and light-headedness.   Hematological: Negative for adenopathy. Does not bruise/bleed easily.   Psychiatric/Behavioral: Negative for agitation and behavioral problems.     Objective:     Vital Signs (Most Recent):  Temp: 98.1 °F (36.7 °C) (07/16/20 1433)  Pulse: 94 (07/16/20 1433)  Resp: 18 (07/16/20 1433)  BP: 139/79 (07/16/20 1433)  SpO2: 97 % (07/16/20 1433) Vital Signs (24h Range):  Temp:  [98.1 °F (36.7 °C)] 98.1 °F (36.7 °C)  Pulse:  [84-94] 94  Resp:  [18] 18  SpO2:  [97 %] 97 %  BP: (120-139)/(79-80) 139/79     Weight: 113.4 kg (250 lb)  Body mass index is 32.98 kg/m².    Physical Exam  Vitals signs reviewed.   Constitutional:       General: He is not in acute distress.     Appearance: He is well-developed. He is not ill-appearing, toxic-appearing or diaphoretic.   HENT:      Head: Normocephalic and atraumatic.      Mouth/Throat:      Mouth: Mucous membranes are moist.   Eyes:      Extraocular Movements: Extraocular movements intact.      Pupils: Pupils are equal, round, and reactive to light.   Neck:      Musculoskeletal: Normal range of motion and neck supple.   Cardiovascular:      Rate and Rhythm: Normal rate and regular rhythm.      Heart sounds: Normal heart sounds.   Pulmonary:      Effort: Pulmonary effort is normal. No respiratory distress.      Breath sounds: Normal breath sounds.   Abdominal:      General: Bowel sounds are normal. There  is no distension.      Palpations: Abdomen is soft.      Tenderness: There is no abdominal tenderness.   Musculoskeletal: Normal range of motion.   Skin:     General: Skin is warm and dry.      Comments: Chronic veno-stasis changes to both shins with mild non-pitting edema.  Left foot is bandaged but noted to have foul smelling drainage, diffuse warmth and edema and packing in the wound on his foot.  Images reviewed in computer.   Neurological:      Mental Status: He is alert and oriented to person, place, and time.      Cranial Nerves: No cranial nerve deficit.      Coordination: Coordination normal.   Psychiatric:         Behavior: Behavior normal.           CRANIAL NERVES     CN III, IV, VI   Pupils are equal, round, and reactive to light.       Significant Labs: All pertinent labs within the past 24 hours have been reviewed.    Significant Imaging: I have reviewed and interpreted all pertinent imaging results/findings within the past 24 hours.

## 2020-07-16 NOTE — ASSESSMENT & PLAN NOTE
-Check A1c  -At home takes trulicity weekly  -Hold that for now and provide diabetic diet with insulin sliding scale ac/hs

## 2020-07-16 NOTE — PATIENT INSTRUCTIONS
Please keep football dressing clean, dry, and intact.  If dressing gets wet please contact our office.    Wear special shoe every time foot is placed on the floor.    Elevate affected foot as much as possible    Stay hydrated.      Nutrition and MyPlate: Protein Foods  This group includes foods that are high in protein. Protein helps the body build new cells and keeps tissues healthy. Most Americans get enough protein without even trying. It can be harder for vegetarians, but plenty of non-meat foods are rich in protein, too. Its best to get protein from a variety of sources.    Nutrient-Rich Choices  Theres a lot more to this food group than just meat and beans. It also includes nuts, seeds, and eggs. There are all sorts of nutrient-rich choices:  · Chicken and turkey with the skin removed  · Fish and shellfish  · Lean beef, pork, or lamb (without visible fat)  · Soy products, such as tofu, soybeans (edamame), tempeh, or soymilk  · Black beans, kidney beans, corrigan beans, chickpeas (garbanzo beans), and lentils (Note: beans and peas count as both a protein and a vegetable)  · Peanuts, almonds, walnuts, sesame seeds, and sunflower  seeds, as well as foods made from these (such as peanut butter or tahini)  · Eggs and foods made with eggs (such as quiche or frittata)  What Makes Meat and Beans Less Healthy?  · Fatty meat is not healthy. Before you cook meat, trim off all the fat you can see. Chicken and turkey skin is also high in fat, and should be removed before cooking.  · Breading and frying make food less healthy. This includes dishes like fried chicken, fried fish, and refried beans.  · Sausage and lunch meats tend to be high in fat and salt. Buy low-fat, low-sodium versions.  One Small Change  Make a meal that includes a non-meat source of protein (such as tofu, lentils, or any other food listed above). Have a better idea? Write it here:  _____________________________________________________________  ©  5259-7073 [employer]. [address] [place] [address], [place] [address]. All rights reserved. This information is not intended as a substitute for professional medical care. Always follow your healthcare professional's instructions.

## 2020-07-16 NOTE — ASSESSMENT & PLAN NOTE
-Baseline Cr is 2.8-3.1 and on admit Cr is mildly above baseline at 3.8.  -Could be due to mild diminished appetite vs burgeoning infection.  -Will hold acei and chlorthalidone  -Renally dose medications and avoid nephrotoxic agents  -Check BNP.  -repeat labs and if any deterioration will consult nephrology.

## 2020-07-16 NOTE — TELEPHONE ENCOUNTER
Patient seen in my office this morning for new wound to the lateral aspect of the left midfoot.      Patient relates that the wound had been present for approximately 2 weeks but he was out of state tending to his ill father who said early passed away yesterday.  While he was out of state he contacted the office and requested oral antibiotics.    On clinical exam wound probed to bone with malodor.   I had patient get x-rays and labs on his way out of clinic this morning.  On review of x-rays there is acute to chronic osteomyelitis at the 5th metatarsal as well as gas in the soft tissues.  Given patient's myriad of health conditions, failure on p.o. antibiosis and need for IV antibiosis as well as need for surgical incision and drainage with bone biopsy likely patient to present to the emergency room for admission.      Gladys Mcfadden [phone] Wife for contact

## 2020-07-17 ENCOUNTER — ANESTHESIA EVENT (OUTPATIENT)
Dept: SURGERY | Facility: HOSPITAL | Age: 55
DRG: 628 | End: 2020-07-17
Payer: COMMERCIAL

## 2020-07-17 ENCOUNTER — ANESTHESIA (OUTPATIENT)
Dept: SURGERY | Facility: HOSPITAL | Age: 55
DRG: 628 | End: 2020-07-17
Payer: COMMERCIAL

## 2020-07-17 LAB
ESTIMATED AVG GLUCOSE: 186 MG/DL (ref 68–131)
GRAM STN SPEC: NORMAL
GRAM STN SPEC: NORMAL
HBA1C MFR BLD HPLC: 8.1 % (ref 4–5.6)
LDH SERPL L TO P-CCNC: 167 U/L (ref 110–260)
OSMOLALITY SERPL: 289 MOSM/KG (ref 280–300)
OSMOLALITY UR: 370 MOSM/KG (ref 50–1200)
POCT GLUCOSE: 226 MG/DL (ref 70–110)
POCT GLUCOSE: 267 MG/DL (ref 70–110)
VANCOMYCIN SERPL-MCNC: <1.1 UG/ML

## 2020-07-17 PROCEDURE — D9220A PRA ANESTHESIA: Mod: CRNA,,, | Performed by: NURSE ANESTHETIST, CERTIFIED REGISTERED

## 2020-07-17 PROCEDURE — 63600175 PHARM REV CODE 636 W HCPCS: Performed by: HOSPITALIST

## 2020-07-17 PROCEDURE — 63600175 PHARM REV CODE 636 W HCPCS: Performed by: NURSE ANESTHETIST, CERTIFIED REGISTERED

## 2020-07-17 PROCEDURE — 87075 CULTR BACTERIA EXCEPT BLOOD: CPT | Mod: 59

## 2020-07-17 PROCEDURE — 87186 SC STD MICRODIL/AGAR DIL: CPT

## 2020-07-17 PROCEDURE — 94761 N-INVAS EAR/PLS OXIMETRY MLT: CPT

## 2020-07-17 PROCEDURE — 87102 FUNGUS ISOLATION CULTURE: CPT

## 2020-07-17 PROCEDURE — 27201423 OPTIME MED/SURG SUP & DEVICES STERILE SUPPLY: Performed by: PODIATRIST

## 2020-07-17 PROCEDURE — 25000003 PHARM REV CODE 250: Performed by: NURSE ANESTHETIST, CERTIFIED REGISTERED

## 2020-07-17 PROCEDURE — 25000003 PHARM REV CODE 250: Performed by: HOSPITALIST

## 2020-07-17 PROCEDURE — 88311 DECALCIFY TISSUE: CPT | Mod: 26,,, | Performed by: PATHOLOGY

## 2020-07-17 PROCEDURE — 36000706: Performed by: PODIATRIST

## 2020-07-17 PROCEDURE — 87116 MYCOBACTERIA CULTURE: CPT

## 2020-07-17 PROCEDURE — 88311 PR  DECALCIFY TISSUE: ICD-10-PCS | Mod: 26,,, | Performed by: PATHOLOGY

## 2020-07-17 PROCEDURE — 87070 CULTURE OTHR SPECIMN AEROBIC: CPT

## 2020-07-17 PROCEDURE — 83036 HEMOGLOBIN GLYCOSYLATED A1C: CPT

## 2020-07-17 PROCEDURE — D9220A PRA ANESTHESIA: Mod: ANES,,, | Performed by: ANESTHESIOLOGY

## 2020-07-17 PROCEDURE — D9220A PRA ANESTHESIA: ICD-10-PCS | Mod: ANES,,, | Performed by: ANESTHESIOLOGY

## 2020-07-17 PROCEDURE — 25000242 PHARM REV CODE 250 ALT 637 W/ HCPCS: Performed by: HOSPITALIST

## 2020-07-17 PROCEDURE — 83615 LACTATE (LD) (LDH) ENZYME: CPT

## 2020-07-17 PROCEDURE — S0020 INJECTION, BUPIVICAINE HYDRO: HCPCS | Performed by: PODIATRIST

## 2020-07-17 PROCEDURE — 37000008 HC ANESTHESIA 1ST 15 MINUTES: Performed by: PODIATRIST

## 2020-07-17 PROCEDURE — 88305 TISSUE EXAM BY PATHOLOGIST: CPT | Performed by: PATHOLOGY

## 2020-07-17 PROCEDURE — 11044 DBRDMT BONE 1ST 20 SQ CM/<: CPT | Mod: ,,, | Performed by: PODIATRIST

## 2020-07-17 PROCEDURE — 11000001 HC ACUTE MED/SURG PRIVATE ROOM

## 2020-07-17 PROCEDURE — 36415 COLL VENOUS BLD VENIPUNCTURE: CPT

## 2020-07-17 PROCEDURE — 87077 CULTURE AEROBIC IDENTIFY: CPT

## 2020-07-17 PROCEDURE — C9399 UNCLASSIFIED DRUGS OR BIOLOG: HCPCS | Performed by: HOSPITALIST

## 2020-07-17 PROCEDURE — 88305 TISSUE EXAM BY PATHOLOGIST: CPT | Mod: 26,,, | Performed by: PATHOLOGY

## 2020-07-17 PROCEDURE — 25000003 PHARM REV CODE 250: Performed by: PODIATRIST

## 2020-07-17 PROCEDURE — 20220 PR BONE BIOPSY,TROCAR/NEEDLE SUPERF: ICD-10-PCS | Mod: 51,,, | Performed by: PODIATRIST

## 2020-07-17 PROCEDURE — 94640 AIRWAY INHALATION TREATMENT: CPT

## 2020-07-17 PROCEDURE — 80202 ASSAY OF VANCOMYCIN: CPT

## 2020-07-17 PROCEDURE — 87205 SMEAR GRAM STAIN: CPT

## 2020-07-17 PROCEDURE — 37000009 HC ANESTHESIA EA ADD 15 MINS: Performed by: PODIATRIST

## 2020-07-17 PROCEDURE — 36000707: Performed by: PODIATRIST

## 2020-07-17 PROCEDURE — 87206 SMEAR FLUORESCENT/ACID STAI: CPT

## 2020-07-17 PROCEDURE — 88311 DECALCIFY TISSUE: CPT | Performed by: PATHOLOGY

## 2020-07-17 PROCEDURE — 87076 CULTURE ANAEROBE IDENT EACH: CPT | Mod: 59

## 2020-07-17 PROCEDURE — D9220A PRA ANESTHESIA: ICD-10-PCS | Mod: CRNA,,, | Performed by: NURSE ANESTHETIST, CERTIFIED REGISTERED

## 2020-07-17 PROCEDURE — 20220 BONE BIOPSY TROCAR/NDL SUPFC: CPT | Mod: 51,,, | Performed by: PODIATRIST

## 2020-07-17 PROCEDURE — 88305 TISSUE EXAM BY PATHOLOGIST: ICD-10-PCS | Mod: 26,,, | Performed by: PATHOLOGY

## 2020-07-17 PROCEDURE — 71000033 HC RECOVERY, INTIAL HOUR: Performed by: PODIATRIST

## 2020-07-17 PROCEDURE — 63600175 PHARM REV CODE 636 W HCPCS: Performed by: ANESTHESIOLOGY

## 2020-07-17 PROCEDURE — 87015 SPECIMEN INFECT AGNT CONCNTJ: CPT

## 2020-07-17 PROCEDURE — 11044 PR DEBRIDEMENT, SKIN, SUB-Q TISSUE,MUSCLE,BONE,=<20 SQ CM: ICD-10-PCS | Mod: ,,, | Performed by: PODIATRIST

## 2020-07-17 RX ORDER — LIDOCAINE HYDROCHLORIDE 10 MG/ML
INJECTION INFILTRATION; PERINEURAL
Status: DISCONTINUED | OUTPATIENT
Start: 2020-07-17 | End: 2020-07-17 | Stop reason: HOSPADM

## 2020-07-17 RX ORDER — ACETAMINOPHEN 10 MG/ML
1000 INJECTION, SOLUTION INTRAVENOUS ONCE
Status: COMPLETED | OUTPATIENT
Start: 2020-07-17 | End: 2020-07-17

## 2020-07-17 RX ORDER — PROPOFOL 10 MG/ML
VIAL (ML) INTRAVENOUS CONTINUOUS PRN
Status: DISCONTINUED | OUTPATIENT
Start: 2020-07-17 | End: 2020-07-17

## 2020-07-17 RX ORDER — GLYCOPYRROLATE 0.2 MG/ML
INJECTION INTRAMUSCULAR; INTRAVENOUS
Status: DISCONTINUED | OUTPATIENT
Start: 2020-07-17 | End: 2020-07-17

## 2020-07-17 RX ORDER — GABAPENTIN 300 MG/1
300 CAPSULE ORAL 2 TIMES DAILY
Status: DISCONTINUED | OUTPATIENT
Start: 2020-07-17 | End: 2020-07-24 | Stop reason: HOSPADM

## 2020-07-17 RX ORDER — LIDOCAINE HYDROCHLORIDE 20 MG/ML
INJECTION INTRAVENOUS
Status: DISCONTINUED | OUTPATIENT
Start: 2020-07-17 | End: 2020-07-17

## 2020-07-17 RX ORDER — FAMOTIDINE 20 MG/1
20 TABLET, FILM COATED ORAL DAILY
Status: DISCONTINUED | OUTPATIENT
Start: 2020-07-17 | End: 2020-07-21

## 2020-07-17 RX ORDER — BUPIVACAINE HYDROCHLORIDE 2.5 MG/ML
INJECTION, SOLUTION INFILTRATION; PERINEURAL
Status: DISCONTINUED | OUTPATIENT
Start: 2020-07-17 | End: 2020-07-17 | Stop reason: HOSPADM

## 2020-07-17 RX ORDER — SODIUM CHLORIDE, SODIUM LACTATE, POTASSIUM CHLORIDE, CALCIUM CHLORIDE 600; 310; 30; 20 MG/100ML; MG/100ML; MG/100ML; MG/100ML
INJECTION, SOLUTION INTRAVENOUS CONTINUOUS PRN
Status: DISCONTINUED | OUTPATIENT
Start: 2020-07-17 | End: 2020-07-17

## 2020-07-17 RX ORDER — HYDROMORPHONE HYDROCHLORIDE 2 MG/ML
0.2 INJECTION, SOLUTION INTRAMUSCULAR; INTRAVENOUS; SUBCUTANEOUS EVERY 5 MIN PRN
Status: DISCONTINUED | OUTPATIENT
Start: 2020-07-17 | End: 2020-07-17 | Stop reason: HOSPADM

## 2020-07-17 RX ORDER — ONDANSETRON 2 MG/ML
INJECTION INTRAMUSCULAR; INTRAVENOUS
Status: DISCONTINUED | OUTPATIENT
Start: 2020-07-17 | End: 2020-07-17

## 2020-07-17 RX ORDER — FENTANYL CITRATE 50 UG/ML
INJECTION, SOLUTION INTRAMUSCULAR; INTRAVENOUS
Status: DISCONTINUED | OUTPATIENT
Start: 2020-07-17 | End: 2020-07-17

## 2020-07-17 RX ORDER — ONDANSETRON 2 MG/ML
4 INJECTION INTRAMUSCULAR; INTRAVENOUS DAILY PRN
Status: DISCONTINUED | OUTPATIENT
Start: 2020-07-17 | End: 2020-07-17 | Stop reason: HOSPADM

## 2020-07-17 RX ORDER — SODIUM CHLORIDE 0.9 % (FLUSH) 0.9 %
10 SYRINGE (ML) INJECTION
Status: DISCONTINUED | OUTPATIENT
Start: 2020-07-17 | End: 2020-07-17 | Stop reason: HOSPADM

## 2020-07-17 RX ORDER — MIDAZOLAM HYDROCHLORIDE 1 MG/ML
INJECTION, SOLUTION INTRAMUSCULAR; INTRAVENOUS
Status: DISCONTINUED | OUTPATIENT
Start: 2020-07-17 | End: 2020-07-17

## 2020-07-17 RX ADMIN — FENTANYL CITRATE 25 MCG: 50 INJECTION INTRAMUSCULAR; INTRAVENOUS at 12:07

## 2020-07-17 RX ADMIN — AMLODIPINE BESYLATE 10 MG: 5 TABLET ORAL at 08:07

## 2020-07-17 RX ADMIN — ACETAMINOPHEN 650 MG: 325 TABLET ORAL at 09:07

## 2020-07-17 RX ADMIN — ALBUTEROL SULFATE 2 PUFF: 90 AEROSOL, METERED RESPIRATORY (INHALATION) at 01:07

## 2020-07-17 RX ADMIN — ALBUTEROL SULFATE 2 PUFF: 90 AEROSOL, METERED RESPIRATORY (INHALATION) at 08:07

## 2020-07-17 RX ADMIN — ACETAMINOPHEN 650 MG: 325 TABLET ORAL at 08:07

## 2020-07-17 RX ADMIN — MIDAZOLAM HYDROCHLORIDE 2 MG: 1 INJECTION, SOLUTION INTRAMUSCULAR; INTRAVENOUS at 12:07

## 2020-07-17 RX ADMIN — FAMOTIDINE 20 MG: 20 TABLET ORAL at 02:07

## 2020-07-17 RX ADMIN — THERA TABS 1 TABLET: TAB at 08:07

## 2020-07-17 RX ADMIN — OXYCODONE HYDROCHLORIDE AND ACETAMINOPHEN 500 MG: 500 TABLET ORAL at 08:07

## 2020-07-17 RX ADMIN — SODIUM CHLORIDE, SODIUM LACTATE, POTASSIUM CHLORIDE, AND CALCIUM CHLORIDE: .6; .31; .03; .02 INJECTION, SOLUTION INTRAVENOUS at 12:07

## 2020-07-17 RX ADMIN — HEPARIN SODIUM 5000 UNITS: 5000 INJECTION INTRAVENOUS; SUBCUTANEOUS at 02:07

## 2020-07-17 RX ADMIN — GLYCOPYRROLATE 0.2 MG: 0.2 INJECTION, SOLUTION INTRAMUSCULAR; INTRAVENOUS at 12:07

## 2020-07-17 RX ADMIN — GABAPENTIN 300 MG: 300 CAPSULE ORAL at 01:07

## 2020-07-17 RX ADMIN — ONDANSETRON 4 MG: 2 INJECTION, SOLUTION INTRAMUSCULAR; INTRAVENOUS at 12:07

## 2020-07-17 RX ADMIN — ACETAMINOPHEN 1000 MG: 10 INJECTION, SOLUTION INTRAVENOUS at 01:07

## 2020-07-17 RX ADMIN — ACETAMINOPHEN 650 MG: 325 TABLET ORAL at 03:07

## 2020-07-17 RX ADMIN — Medication 40 MG: at 12:07

## 2020-07-17 RX ADMIN — INSULIN DETEMIR 5 UNITS: 100 INJECTION, SOLUTION SUBCUTANEOUS at 09:07

## 2020-07-17 RX ADMIN — PROPOFOL 50 MCG/KG/MIN: 10 INJECTION, EMULSION INTRAVENOUS at 12:07

## 2020-07-17 RX ADMIN — DEXAMETHASONE 6 MG: 2 TABLET ORAL at 08:07

## 2020-07-17 RX ADMIN — GABAPENTIN 300 MG: 300 CAPSULE ORAL at 08:07

## 2020-07-17 RX ADMIN — VANCOMYCIN HYDROCHLORIDE 1250 MG: 1.25 INJECTION, POWDER, LYOPHILIZED, FOR SOLUTION INTRAVENOUS at 07:07

## 2020-07-17 RX ADMIN — HEPARIN SODIUM 5000 UNITS: 5000 INJECTION INTRAVENOUS; SUBCUTANEOUS at 09:07

## 2020-07-17 RX ADMIN — HEPARIN SODIUM 5000 UNITS: 5000 INJECTION INTRAVENOUS; SUBCUTANEOUS at 06:07

## 2020-07-17 RX ADMIN — GABAPENTIN 300 MG: 300 CAPSULE ORAL at 09:07

## 2020-07-17 RX ADMIN — ATORVASTATIN CALCIUM 40 MG: 40 TABLET, FILM COATED ORAL at 08:07

## 2020-07-17 RX ADMIN — OXYCODONE HYDROCHLORIDE AND ACETAMINOPHEN 500 MG: 500 TABLET ORAL at 09:07

## 2020-07-17 NOTE — OP NOTE
Operative Note       Surgery Date: 7/17/2020     Surgeon(s) and Role:     * Verona Whitmore DPM - Primary    Pre-op Diagnosis:  Osteomyelitis of left foot, unspecified type [M86.9]    Post-op Diagnosis: Post-Op Diagnosis Codes:     * Osteomyelitis of left foot, unspecified type [M86.9]    Procedure(s) (LRB):  DEBRIDEMENT (Left)  BIOPSY, BONE (Left)    Anesthesia: Local MAC    Procedure in Detail/Findings:    The patient was brought to the operating room on a stretcher and placed on the operating table in a supine position. Following the successful induction of MAC anesthesia, a tourniquet was applied to the patients left  ankle. Following this, a local anesthetic block consisting of aproximately 10 cc of  1:1 mixture of 1% lidocaine plain + 0.5% marcaine plain was injected left foot . Then, the left  foot was scrubbed, prepped and draped in the usual aseptic manner. . A time out was performed . No tourniquet inflated for the duration of the procedure.     Attention was then directed to the left foot.  A sterile #15 blade was used to excisionally debride viable and non viable tissue on the plantar aspect of the left foot. At this time a pocket of purulent drainage noted adjacent to 5th metatarsal. Next a #15 blade used to make incision both proximal and distal to wound left plantar foot. Aerobic,anerobic culture obtained. Next   Tissue debrided through epidermis, dermis, and subcutaneous tissue. Tissue excised included epidermis, dermis, sucutaneous tissue, fibrin, biofilm, callus and bone.  Next jamshidi needle used to obtain bone specimen left 5th metatarsal . Bone noted to be firm. Adequate bleeding noted intraop.  Surgical site copiously irrigated with 3L NS pulse lavage. Proximal and distal incision surrounding left plantar foot wound sutured with multiple simple sutures 3-0 prolene.   Wound packed with 4x4 gauze soaked in betadine. Wrapped with 4x4 gauze, cast padding, kerlix and ACE.     The patient tolerated  the procedure and anesthesia well. He was transferred to the recovery room with vital signs stable, vascular status intact, and capillary refill time < 3 seconds to the distal left foot. Following a period of post op monitoring, the patient will be discharged home on the following written and oral post op instructions:   1. Keep dressing dry and intact until clinic visit.  2. Avoid excessive ambulation, ambulate with surgical shoe on at all times.   3. Ice and elevate left  foot when at rest.  4. ID consult placed to further tailor abx.         Estimated Blood Loss: <10ml.            Specimens (From admission, onward)     Start     Ordered    07/17/20 1301  Specimen to Pathology, Surgery Other  Once     Question:  Procedure Type:  Answer:  Other    07/17/20 1301              Implants: * No implants in log *           Disposition: PACU - hemodynamically stable.           Condition: Good    Attestation:  I was present and scrubbed for the entire procedure.

## 2020-07-17 NOTE — CONSULTS
[facility] [place]  Podiatry  Consult Note    Patient Name: Catalino Mcfadden  MRN: [id number]  Admission Date: 7/16/2020  Hospital Length of Stay: 1 days  Attending Physician: Vikas Reyes MD  Primary Care Provider: Azikiwe K Lombard, MD     Consults  Subjective:     History of Present Illness: 54 y/o male PMH DM2, CKD admitted for left foot infection. Reports left foot ulceration x 3 weeks, thinks this started due to rubbing of brace on the lateral foot. Pt. Followed by Dr. Martinez per chart review contacted the office on 6/29/20 stating that he was heading out of town to [place] to check on his father. Rx. Cipro then sent to pharmacy which patient reports taking. He returned to [place] yesterday and was seen by . Xray left foot ordered revealing OM, gas forming infection. Sent to ED for admission. Denies N/V/F. Reports some chills.     Scheduled Meds:   albuterol  2 puff Inhalation Q6H    amLODIPine  10 mg Oral Daily    ascorbic acid (vitamin C)  500 mg Oral BID    atorvastatin  40 mg Oral Daily    dexAMETHasone  6 mg Oral Daily    gabapentin  300 mg Oral BID    heparin (porcine)  5,000 Units Subcutaneous Q8H    multivitamin  1 tablet Oral Daily     Continuous Infusions:  PRN Meds:acetaminophen, benzonatate, dextrose 50%, dextrose 50%, glucagon (human recombinant), glucose, glucose, hydrALAZINE, insulin aspart U-100, ondansetron, sodium chloride 0.9%    Review of patient's allergies indicates:   Allergen Reactions    Morphine Hallucinations        Past Medical History:   Diagnosis Date    CKD (chronic kidney disease), stage III 07/16/2020    CKD stage 3 due to type 1 diabetes mellitus     CKD stage 3 due to type 2 diabetes mellitus     Diabetes mellitus, type 2     Diabetic foot ulcer associated with diabetes mellitus due to underlying condition 07/16/2020    Diabetic foot ulcers     Hyperlipidemia     Hypertension      Past Surgical History:   Procedure Laterality Date    CERVICAL DISC  SURGERY  07/11/2016    left leg orthopedic surgery Left        Family History     Problem Relation (Age of Onset)    Heart disease Father        Tobacco Use    Smoking status: Never Smoker    Smokeless tobacco: Never Used   Substance and Sexual Activity    Alcohol use: Not Currently     Alcohol/week: 0.0 standard drinks     Comment: social    Drug use: No    Sexual activity: Not on file     Review of Systems   Constitutional: Positive for activity change.   Respiratory: Negative for shortness of breath.    Cardiovascular: Negative for chest pain and leg swelling.   Gastrointestinal: Negative for nausea and vomiting.   Musculoskeletal: Positive for arthralgias.   Neurological: Positive for numbness. Negative for weakness.     Objective:     Vital Signs (Most Recent):  Temp: 98.9 °F (37.2 °C) (07/17/20 0421)  Pulse: 94 (07/17/20 0421)  Resp: 20 (07/17/20 0421)  BP: 131/66 (07/17/20 0421)  SpO2: (!) 94 % (07/17/20 0421) Vital Signs (24h Range):  Temp:  [98.1 °F (36.7 °C)-99.1 °F (37.3 °C)] 98.9 °F (37.2 °C)  Pulse:  [] 94  Resp:  [18-20] 20  SpO2:  [94 %-97 %] 94 %  BP: (120-147)/(66-91) 131/66     Weight: 112 kg (246 lb 14.6 oz)  Body mass index is 32.58 kg/m².    Foot Exam    General  Orientation: alert and oriented to person, place, and time       Right Foot/Ankle     Inspection and Palpation  Tenderness: none   Swelling: none   Skin Exam: no ulcer     Neurovascular  Dorsalis pedis: 1+  Posterior tibial: 1+      Left Foot/Ankle      Inspection and Palpation  Tenderness: metatarsals   Swelling: none   Skin Exam: ulcer;     Neurovascular  Dorsalis pedis: 1+  Posterior tibial: 1+          7/17/20:  Ulcer location: Left  lateral 5th metatarsal base   Signs of infection: +malodor, local edema and erythema  Drainage: Sero-Sanguinous  Purulence: no  Crepitus/fluctuance: no  Periwound: Reddened, Macerated, Calloused  Base: Fibrotic slough  Depth: muscle  Probe to bone: yes                Laboratory:  CBC:    Recent Labs   Lab 07/16/20  1607   WBC 8.89   RBC 4.43*   HGB 11.8*   HCT 36.5*      MCV 82   MCH 26.6*   MCHC 32.3     CMP:   Recent Labs   Lab 07/16/20  1607   *   CALCIUM 8.7   ALBUMIN 2.9*   PROT 8.2   *   K 5.1   CO2 20*   CL 97   BUN 54*   CREATININE 3.8*   ALKPHOS 305*   ALT 33   AST 38   BILITOT 0.7       Diagnostic Results:  Xray: X-Ray Foot Complete Left  Order: [id number]  Status:  Final result   Visible to patient:  No (not released) Next appt:  07/23/2020 at 10:15 AM in Podiatry (Aviva Martinez DPM) Dx:  Type II diabetes mellitus with neurol...  Details    Reading Physician Reading Date Result Priority   Segundo Brewster Jr., MD  [phone] 7/16/2020 Routine      Narrative & Impression     EXAMINATION:  XR FOOT COMPLETE 3 VIEW LEFT     CLINICAL HISTORY:  wound to lateral base of 5th met; Type 2 diabetes mellitus with other diabetic neurological complication     TECHNIQUE:  XR FOOT COMPLETE 3 VIEW LEFT     COMPARISON:  None     FINDINGS:  There is soft tissue swelling of the midfoot and forefoot with soft tissue ulceration and soft tissue gas adjacent to the base of the 5th metatarsal bone.  There is abnormal sclerosis of the base of the 5th metatarsal shaft which is suspicious for chronic osteomyelitis in light of the adjacent soft tissue ulcer.  Throughout the midfoot there are vague areas of cystic change, there is joint disorganization and loss of normal anatomy suggestive of Charcot arthropathy.  Superimposed septic arthritis and osteomyelitis difficult to exclude with certainty.  There is severe hallux valgus deformity with areas of heterotopic ossification involving the plantar soft tissues of the foot.  Dorsal and plantar calcaneal spurs are present and there are remote fracture deformities of the distal fibula and posterior malleolus.     Impression:     Lateral midfoot soft tissue ulcer and suspected osteomyelitis of the 5th metatarsal bone.     Abnormalities throughout  the midfoot which may reflect Charcot arthropathy and or septic arthritis/osteomyelitis     Posttraumatic changes of the distal tibia and fibula and arthritic changes of the 1st MTP joint.                 US Lower Extrem Arteries Bilat with BLANK  Order: [id number]  Status:  Final result   Visible to patient:  No (not released) Next appt:  07/23/2020 at 10:15 AM in Podiatry (Aviva Martinez DPM)  Details    Reading Physician Reading Date Result Priority   Porsha Mcgowan MD  [phone] 7/16/2020 STAT      Narrative & Impression     EXAMINATION:  US ARTERIAL LOWER EXTREMITY BILAT WITH BLANK (XPD)     CLINICAL HISTORY:  Eval for PVD;     TECHNIQUE:  Bilateral ankle-brachial indices as well as bilateral spectral, color and grayscale images of the large arteries of both lower extremities were performed.     COMPARISON:  None     FINDINGS:  ABIs are 0.9 on the right and 0.9 on the left.     Right lower extremity:     Common femoral artery: 96 cm/sec, triphasic     SFA proximal: 76 cm/sec, triphasic     SFA mid: 74 cm/sec, triphasic     SFA distal: 235 cm/sec, triphasic     Popliteal artery: 95 cm/sec, triphasic     Posterior tibial artery: 70 cm/sec     Anterior tibial artery: 46 cm/sec     Peroneal artery: 28 cm/sec     Left lower extremity:     Common femoral artery: 134 cm/sec, triphasic     SFA proximal: 111 cm/sec, triphasic     SFA mid: 98 cm/sec, triphasic     SFA distal: 116 cm/sec, triphasic     Popliteal artery: 93 cm/sec, biphasic     Posterior tibial artery: 106 cm/sec     Anterior tibial artery: 44 cm/sec     Peroneal artery: 78 cm/sec     Impression:     1. ABIs measure 0.9 bilaterally.  2. Greater than doubling of velocities seen between the mid to distal right SFA which may indicate hemodynamically significant stenosis, although normal triphasic waveforms are maintained.  3. Otherwise no additional high-grade stenosis or occlusion seen involving the bilateral lower extremity arteries.                   Clinical Findings:  Left lateral foot ulceration with probe to bone.     Assessment/Plan:     Active Diagnoses:    Diagnosis Date Noted POA    PRINCIPAL PROBLEM:  Osteomyelitis of left foot [M86.9] 07/16/2020 Yes    Diabetic ulcer of toe of left foot [E11.621, L97.529] 07/16/2020 Yes    COVID-19 virus infection [U07.1] 07/16/2020 Yes    Hyponatremia [E87.1] 07/16/2020 Yes    Acute renal failure superimposed on stage 3 chronic kidney disease [N17.9, N18.3] 07/16/2020 Yes    Type 2 diabetes mellitus with foot ulcer, without long-term current use of insulin [E11.621, L97.509] 11/08/2019 Yes    Hyperlipidemia, acquired [E78.5] 12/24/2014 Yes    Essential hypertension [I10] 08/26/2014 Yes      Problems Resolved During this Admission:       Discussed two options with patient. Amputation of left 5th toe/partial ray  vs IV abx for six weeks with aggressive wound care for several months with no guarantee of wound resolution.  Patient would like to try IV abx at this time.     Plan for left foot debridement, bone biopsy today in OR.     Case request placed.     NPO    Vascular surgery consult placed- PAD eval.     The risks, benefits, complications, treatment options, and expected outcomes were discussed with the patient. The risks and potential complications of their problem and purposed treatment include but are not limited to infection, nerve injury, vascular injury, persistent pain, potential skin necrosis, deep vein thrombosis, possible pulmonary embolus,and complications of the anesthetics. Informed verbal and written consent was obtained. Consent forms read, signed, witnessed.        Thank you for your consult. I will follow-up with patient. Please contact us if you have any additional questions.    Verona Whitmore DPM  Podiatry  [facility] [place]

## 2020-07-17 NOTE — TRANSFER OF CARE
"Anesthesia Transfer of Care Note    Patient: Catalino Mcfadden    Procedure(s) Performed: Procedure(s) (LRB):  DEBRIDEMENT (Left)  BIOPSY, BONE (Left)    Patient location: Other: OR 5    Anesthesia Type: MAC    Post pain: adequate analgesia    Post assessment: no apparent anesthetic complications and tolerated procedure well    Post vital signs: stable    Level of consciousness: awake, alert and oriented    Nausea/Vomiting: no nausea/vomiting    Complications: none    Transfer of care protocol was followed      Last vitals:   Visit Vitals  /74 (BP Location: Left arm)   Pulse 90   Temp 36.5 °C (97.7 °F) (Skin)   Resp 17   Ht 6' 1" (1.854 m)   Wt 112 kg (246 lb 14.6 oz)   SpO2 97%   BMI 32.58 kg/m²     "

## 2020-07-17 NOTE — ANESTHESIA PREPROCEDURE EVALUATION
07/17/2020  Catalino Mcfadden is a 55 y.o., male.  To undergo Procedure(s) (LRB):  INCISION AND DRAINAGE, FOOT vs. partial ray amputation (Left)     Denies CP/SOB/GERD/MI/CVA.  METS > 4  NPO > 8    Past Medical History:  Past Medical History:   Diagnosis Date    CKD (chronic kidney disease), stage III 07/16/2020    CKD stage 3 due to type 1 diabetes mellitus     CKD stage 3 due to type 2 diabetes mellitus     Diabetes mellitus, type 2     Diabetic foot ulcer associated with diabetes mellitus due to underlying condition 07/16/2020    Diabetic foot ulcers     Hyperlipidemia     Hypertension        Past Surgical History:  Past Surgical History:   Procedure Laterality Date    CERVICAL DISC SURGERY  07/11/2016    left leg orthopedic surgery Left        Social History:  Social History     Socioeconomic History    Marital status:      Spouse name: Not on file    Number of children: Not on file    Years of education: Not on file    Highest education level: Not on file   Occupational History    Not on file   Social Needs    Financial resource strain: Not on file    Food insecurity     Worry: Not on file     Inability: Not on file    Transportation needs     Medical: Not on file     Non-medical: Not on file   Tobacco Use    Smoking status: Never Smoker    Smokeless tobacco: Never Used   Substance and Sexual Activity    Alcohol use: Not Currently     Alcohol/week: 0.0 standard drinks     Comment: social    Drug use: No    Sexual activity: Not on file   Lifestyle    Physical activity     Days per week: Not on file     Minutes per session: Not on file    Stress: Not on file   Relationships    Social connections     Talks on phone: Not on file     Gets together: Not on file     Attends Buddhism service: Not on file     Active member of club or organization: Not on file     Attends meetings of  clubs or organizations: Not on file     Relationship status: Not on file   Other Topics Concern    Not on file   Social History Narrative    Not on file       Medications:  No current facility-administered medications on file prior to encounter.      Current Outpatient Medications on File Prior to Encounter   Medication Sig Dispense Refill    amLODIPine (NORVASC) 10 MG tablet Take 1 tablet (10 mg total) by mouth once daily. 90 tablet 3    atorvastatin (LIPITOR) 40 MG tablet Take 1 tablet (40 mg total) by mouth once daily. 90 tablet 3    blood sugar diagnostic Strp 1 strip by Misc.(Non-Drug; Combo Route) route 3 (three) times daily. Patient needs One Touch Test Strips (Berio). 100 strip 2    chlorthalidone (HYGROTEN) 25 MG Tab TAKE 1 TABLET(25 MG) BY MOUTH EVERY DAY 90 tablet 0    diltiaZEM (CARDIZEM CD) 240 MG 24 hr capsule TAKE 1 CAPSULE BY MOUTH EVERY DAY 90 capsule 0    diltiaZEM (CARDIZEM CD) 360 MG 24 hr capsule Take 1 capsule (360 mg total) by mouth once daily. 90 capsule 3    lisinopriL (PRINIVIL,ZESTRIL) 20 MG tablet TAKE 1 TABLET(20 MG) BY MOUTH EVERY DAY 90 tablet 0    TRULICITY 0.75 mg/0.5 mL PnIj INJECT 0.5 ML UNDER THE SKIN EVERY 7 DAYS 2 mL 5       Allergies:  Review of patient's allergies indicates:   Allergen Reactions    Morphine Hallucinations       Active Problems:  Patient Active Problem List   Diagnosis    Uncontrolled type 2 diabetes mellitus with stage 3 chronic kidney disease, without long-term current use of insulin    Essential hypertension    Hyperlipidemia, acquired    ED (erectile dysfunction)    CKD (chronic kidney disease), stage III    History of diabetic ulcer of foot    Osteomyelitis of second toe of left foot    Type 2 diabetes mellitus with foot ulcer, without long-term current use of insulin    Long term (current) use of antibiotics    Pre-op exam    Osteomyelitis of left foot    Diabetic ulcer of toe of left foot    COVID-19 virus infection     Hyponatremia    Acute renal failure superimposed on stage 3 chronic kidney disease       Diagnostic Studies:    CBC:  Recent Labs   Lab 07/16/20  1607   WBC 8.89   RBC 4.43*   HGB 11.8*   HCT 36.5*      MCV 82   MCH 26.6*   MCHC 32.3        CMP:  Recent Labs   Lab 07/16/20  1607   CALCIUM 8.7   PROT 8.2   *   K 5.1   CO2 20*   CL 97   BUN 54*   CREATININE 3.8*   ALKPHOS 305*   ALT 33   AST 38   BILITOT 0.7     Results for JOMAR LEDESMA (MRN [id number]) as of 7/17/2020 09:39   Ref. Range 7/16/2020 16:15   SARS-CoV-2 RNA, Amplification, Qual Latest Ref Range: Negative  Positive (A)     PT/INR/PTT:  No results for input(s): PT, INR, APTT in the last 24 hours.    EKG (7/16/20):  Normal sinus rhythm   Minimal voltage criteria for LVH, may be normal variant   Borderline Abnormal ECG     TTE (1/17/20):  · Concentric left ventricular remodeling.  · Normal left ventricular systolic function. The estimated ejection fraction is 65%.  · Normal LV diastolic function.  · No wall motion abnormalities.  · Normal right ventricular systolic function.  · No pulmonary hypertension present.    CXR (7/16/20):  Heart appears upper limits of normal in size, noting magnification on this single AP portable view.  Lungs are significantly hypoinflated which accentuates pulmonary vascular markings.  Mild patchy mixed interstitial airspace opacity is seen which may relate to low lung volumes, however alternatively can present in the setting of mild pulmonary edema or atypical infectious process in the right clinical setting.  Small focal nodular opacity is seen in the right mid lung zone measuring 1.2 cm.  No pneumothorax or significant pleural effusion seen.      24 Hour Vitals:  Temp:  [36.7 °C (98.1 °F)-37.3 °C (99.1 °F)] 36.9 °C (98.5 °F)  Pulse:  [] 86  Resp:  [18-20] 18  SpO2:  [94 %-97 %] 95 %  BP: (120-170)/(66-91) 170/88   See Nursing Charting For Additional Vitals    Anesthesia Evaluation    I have reviewed the Patient  Summary Reports.    I have reviewed the Nursing Notes.       Review of Systems  Anesthesia Hx:  No problems with previous Anesthesia   Denies Personal Hx of Anesthesia complications.   Social:  No Alcohol Use, Non-Smoker    Hematology/Oncology:         -- Anemia:   Cardiovascular:   Exercise tolerance: good Hypertension, well controlled hyperlipidemia ECG has been reviewed.    Pulmonary:   SARS-COV-2   Renal/:   Chronic Renal Disease, CRI    Hepatic/GI:  Hepatic/GI Normal    Endocrine:   Diabetes, poorly controlled, type 2        Physical Exam  General:  Well nourished    Airway/Jaw/Neck:   MP2, TMD > 3FB, teeth intact     Chest/Lungs:  Chest/Lungs Clear    Heart/Vascular:  Heart Findings: Normal            Anesthesia Plan  Type of Anesthesia, risks & benefits discussed:  Anesthesia Type:  general, MAC  Patient's Preference:   Intra-op Monitoring Plan: standard ASA monitors  Intra-op Monitoring Plan Comments:   Post Op Pain Control Plan: multimodal analgesia, IV/PO Opioids PRN and per primary service following discharge from PACU  Post Op Pain Control Plan Comments:   Induction:   IV  Beta Blocker:  Patient is not currently on a Beta-Blocker (No further documentation required).       Informed Consent: Patient understands risks and agrees with Anesthesia plan.  Questions answered. Anesthesia consent signed with patient.  ASA Score: 3     Day of Surgery Review of History & Physical:  There are no significant changes.          Ready For Surgery From Anesthesia Perspective.

## 2020-07-17 NOTE — SUBJECTIVE & OBJECTIVE
Interval History: No acute events overnight.  No sob, cough or fever.  Minimal appetite.  Denies pain in his foot.  No cp.    Review of Systems   Constitutional: Negative for activity change, appetite change and fever.   HENT: Negative for congestion and dental problem.    Eyes: Negative for discharge and itching.   Respiratory: Negative for apnea, cough, chest tightness and shortness of breath.    Cardiovascular: Negative for chest pain and leg swelling.   Gastrointestinal: Negative for abdominal distention and abdominal pain.   Endocrine: Negative for cold intolerance and heat intolerance.   Genitourinary: Negative for difficulty urinating and dysuria.   Musculoskeletal: Negative for arthralgias and back pain.   Skin: Positive for wound.   Allergic/Immunologic: Negative for environmental allergies and food allergies.   Neurological: Negative for dizziness, facial asymmetry and light-headedness.   Hematological: Negative for adenopathy. Does not bruise/bleed easily.   Psychiatric/Behavioral: Negative for agitation and behavioral problems.     Objective:     Vital Signs (Most Recent):  Temp: 97.8 °F (36.6 °C) (07/17/20 1611)  Pulse: 86 (07/17/20 1611)  Resp: 18 (07/17/20 1611)  BP: 130/69 (07/17/20 1611)  SpO2: 95 % (07/17/20 1611) Vital Signs (24h Range):  Temp:  [97.7 °F (36.5 °C)-99.1 °F (37.3 °C)] 97.8 °F (36.6 °C)  Pulse:  [] 86  Resp:  [16-20] 18  SpO2:  [94 %-97 %] 95 %  BP: (130-170)/(66-91) 130/69     Weight: 112 kg (246 lb 14.6 oz)  Body mass index is 32.58 kg/m².    Intake/Output Summary (Last 24 hours) at 7/17/2020 1812  Last data filed at 7/17/2020 1800  Gross per 24 hour   Intake 240 ml   Output 615 ml   Net -375 ml      Physical Exam  Vitals signs reviewed.   Constitutional:       General: He is not in acute distress.     Appearance: He is well-developed. He is not ill-appearing, toxic-appearing or diaphoretic.   HENT:      Head: Normocephalic and atraumatic.      Mouth/Throat:      Mouth:  Mucous membranes are moist.   Eyes:      Extraocular Movements: Extraocular movements intact.      Pupils: Pupils are equal, round, and reactive to light.   Neck:      Musculoskeletal: Normal range of motion and neck supple.   Cardiovascular:      Rate and Rhythm: Normal rate and regular rhythm.      Heart sounds: Normal heart sounds.   Pulmonary:      Effort: Pulmonary effort is normal. No respiratory distress.      Breath sounds: Normal breath sounds.   Abdominal:      General: Bowel sounds are normal. There is no distension.      Palpations: Abdomen is soft.      Tenderness: There is no abdominal tenderness.   Musculoskeletal: Normal range of motion.   Skin:     General: Skin is warm and dry.      Comments: Chronic veno-stasis changes to both shins with mild non-pitting edema.  Left foot is bandaged but noted to have foul smelling drainage, diffuse warmth and edema and packing in the wound on his foot.  Images reviewed in computer.   Neurological:      Mental Status: He is alert and oriented to person, place, and time.      Cranial Nerves: No cranial nerve deficit.      Coordination: Coordination normal.   Psychiatric:         Behavior: Behavior normal.         Significant Labs: All pertinent labs within the past 24 hours have been reviewed.    Significant Imaging: I have reviewed and interpreted all pertinent imaging results/findings within the past 24 hours.

## 2020-07-17 NOTE — CONSULTS
Imaging reviewed.  Patient appears to have adequate perfusion to heal foot wound.  Rec Abx, ID consult, continued Podiatry care, offloading, nutrition, optimization of glycemic control and comorbidities.  Toe pressures and waveforms when safely able.  Full consult note to follow.

## 2020-07-17 NOTE — PLAN OF CARE
Unable to go to patient's room to discuss patient managing his care at home due to Covid 19. [place] spoke with wife via phone     [place] Role Explained.  Patient identified by using 2 identifiers:  Name and date of birth    Wife stated that she WILL HELP AT HOME WITH his RECOVERY.  They have no other help at home.    Preferred Pharmacy:  [id number] - [place], [place] - [address] [facility]  [address]  [facility] [address]  Phone: [phone] Fax: [phone]    [id number] - [place], [place] - [address]  [address] [place] [address]  Phone: [phone] Fax: [phone]    [facility][id number] - [place], [place] - [address] AT [address]  [facility] [address]  Phone: [phone] Fax: [phone]             07/17/20 1601   Discharge Assessment   Assessment Type Discharge Planning Assessment   Confirmed/corrected address and phone number on facesheet? Yes   Assessment information obtained from? Patient   Expected Length of Stay (days) 3   Communicated expected length of stay with patient/caregiver yes   Prior to hospitilization cognitive status: Alert/Oriented   Prior to hospitalization functional status: Independent   Current cognitive status: Alert/Oriented   Current Functional Status: Independent   Lives With spouse;child(lam), adult   Able to Return to Prior Arrangements yes   Is patient able to care for self after discharge? Yes   Patient's perception of discharge disposition home or selfcare   Readmission Within the Last 30 Days no previous admission in last 30 days   Patient currently being followed by outpatient case management? No   Patient currently receives any other outside agency services? No   Equipment Currently Used at Home none   Do you have any problems affording any of your prescribed medications? TBD  (Has had problms in the past)   Is the patient taking medications as prescribed?  yes   Does the patient have transportation home? Yes   Transportation Anticipated family or friend will provide   Does the patient receive services at the [facility]? No   Discharge Plan A Home with family   Discharge Plan B Home Health   DME Needed Upon Discharge    (tbd)   Patient/Family in Agreement with Plan yes

## 2020-07-17 NOTE — NURSING
Patient returned to room via bed from OR. Patient awake, alert, oriented x4. Dressing on LLE clean, dry and intact. No apparent distress noted at this time.

## 2020-07-17 NOTE — ASSESSMENT & PLAN NOTE
-Baseline Cr is 2.8-3.1 and on admit Cr is mildly above baseline at 3.8.  -Could be due to mild diminished appetite vs burgeoning infection.  -Will hold acei and chlorthalidone  -Renally dose medications and avoid nephrotoxic agents  -repeat labs and if any deterioration will consult nephrology.

## 2020-07-17 NOTE — NURSING
Received patient lying in bed, alert and orientated x3, resp even and nonlabored, lungs diminished bilaterally, heplock to right ac intact wihtout redness or swelling, abd soft bowel sounds positive, drsg to left foot clean dry and intact, denies any pain, sr up x2, bed alarm on for safety

## 2020-07-17 NOTE — PROGRESS NOTES
[facility] Hospital Medicine  Progress Note    Patient Name: Catalino Mcfadden  MRN: [id number]  Patient Class: IP- Inpatient   Admission Date: 7/16/2020  Length of Stay: 1 days  Attending Physician: Vikas Reyes MD  Primary Care Provider: Azikiwe K Lombard, MD        Subjective:     Principal Problem:Osteomyelitis of left foot        HPI:  Mr. Mcfadden is a 55 year old man with history of diabetes mellitus, diabetic foot ulcers, hypertension, hyperlipidemia and CKDIII who was found to have osteomyelitis on outside imaging and sent in by his podiatrist, Dr. Martinez.  He states that he has had ulcers on his left foot for quite some time and they had been stable.  Then 2.5 weeks ago he noted a blister on his left foot which subsequently popped.  After discussing with his podiatrist he was started on cipro.  At the time, he was in [place], [place] because his father was ill and in the hospital.  His father passed away with complications from covid-19 infection.  Because he was out of town attending to his father's illness, he was unable to follow up with Dr. Martinez until today.  Xray of his foot was obtained and showed osteomyelitis of his left 5th toe.  He noted a malodorous drainage and a mild achey pain.  He denied fevers, chills, nausea, vomiting, diarrhea, lethargy, malaise, shortness of breath or cough.  He notes he drinks at least a gallon of water daily.    Overview/Hospital Course:  No notes on file    Interval History: No acute events overnight.  No sob, cough or fever.  Minimal appetite.  Denies pain in his foot.  No cp.    Review of Systems   Constitutional: Negative for activity change, appetite change and fever.   HENT: Negative for congestion and dental problem.    Eyes: Negative for discharge and itching.   Respiratory: Negative for apnea, cough, chest tightness and shortness of breath.    Cardiovascular: Negative for chest pain and leg swelling.   Gastrointestinal: Negative for abdominal distention  and abdominal pain.   Endocrine: Negative for cold intolerance and heat intolerance.   Genitourinary: Negative for difficulty urinating and dysuria.   Musculoskeletal: Negative for arthralgias and back pain.   Skin: Positive for wound.   Allergic/Immunologic: Negative for environmental allergies and food allergies.   Neurological: Negative for dizziness, facial asymmetry and light-headedness.   Hematological: Negative for adenopathy. Does not bruise/bleed easily.   Psychiatric/Behavioral: Negative for agitation and behavioral problems.     Objective:     Vital Signs (Most Recent):  Temp: 97.8 °F (36.6 °C) (07/17/20 1611)  Pulse: 86 (07/17/20 1611)  Resp: 18 (07/17/20 1611)  BP: 130/69 (07/17/20 1611)  SpO2: 95 % (07/17/20 1611) Vital Signs (24h Range):  Temp:  [97.7 °F (36.5 °C)-99.1 °F (37.3 °C)] 97.8 °F (36.6 °C)  Pulse:  [] 86  Resp:  [16-20] 18  SpO2:  [94 %-97 %] 95 %  BP: (130-170)/(66-91) 130/69     Weight: 112 kg (246 lb 14.6 oz)  Body mass index is 32.58 kg/m².    Intake/Output Summary (Last 24 hours) at 7/17/2020 1812  Last data filed at 7/17/2020 1800  Gross per 24 hour   Intake 240 ml   Output 615 ml   Net -375 ml      Physical Exam  Vitals signs reviewed.   Constitutional:       General: He is not in acute distress.     Appearance: He is well-developed. He is not ill-appearing, toxic-appearing or diaphoretic.   HENT:      Head: Normocephalic and atraumatic.      Mouth/Throat:      Mouth: Mucous membranes are moist.   Eyes:      Extraocular Movements: Extraocular movements intact.      Pupils: Pupils are equal, round, and reactive to light.   Neck:      Musculoskeletal: Normal range of motion and neck supple.   Cardiovascular:      Rate and Rhythm: Normal rate and regular rhythm.      Heart sounds: Normal heart sounds.   Pulmonary:      Effort: Pulmonary effort is normal. No respiratory distress.      Breath sounds: Normal breath sounds.   Abdominal:      General: Bowel sounds are normal. There is  no distension.      Palpations: Abdomen is soft.      Tenderness: There is no abdominal tenderness.   Musculoskeletal: Normal range of motion.   Skin:     General: Skin is warm and dry.      Comments: Chronic veno-stasis changes to both shins with mild non-pitting edema.  Left foot is bandaged but noted to have foul smelling drainage, diffuse warmth and edema and packing in the wound on his foot.  Images reviewed in computer.   Neurological:      Mental Status: He is alert and oriented to person, place, and time.      Cranial Nerves: No cranial nerve deficit.      Coordination: Coordination normal.   Psychiatric:         Behavior: Behavior normal.         Significant Labs: All pertinent labs within the past 24 hours have been reviewed.    Significant Imaging: I have reviewed and interpreted all pertinent imaging results/findings within the past 24 hours.      Assessment/Plan:      * Osteomyelitis of left foot  -Mr. Mcfadden was admitted to inpatient status  -He had a diabetic foot wound that has failed outpatient treatment with cipro and radiographic evidence of osteomyelitis  -On admit he was afebrile with normal wbc and CRP elevated at 233.  -Doppler US with BLANK obtained.  Vascular surgery consulted and state he has adequate perfusion to heal his wounds.  No intervention needed at this time.  -Podiatry consulted and took patient for debridement and bone biopsy today.  -Will start empiric vancomycin.  -Await cultures and sensitivities.  -Will need six weeks of IV antibiotics and wound care.    Diabetic ulcer of toe of left foot  -Treatment as above      COVID-19 virus infection  -His father just passed away from covid infection and he has tested positive in the ER  -He remains afebrile without any symptoms at this time.  O2 sats are normal on admit and remain so.  -Will check d-dimer, ferritin and crp q48 hours  -Will keep in covid isolation  -Will treat with vitamin C and multivitamin  -If O2sats drop below 94% will  start supplemental O2 and dexamethasone.      Acute renal failure superimposed on stage 3 chronic kidney disease  -Baseline Cr is 2.8-3.1 and on admit Cr is mildly above baseline at 3.8.  -Could be due to mild diminished appetite vs burgeoning infection.  -Will hold acei and chlorthalidone  -Renally dose medications and avoid nephrotoxic agents  -repeat labs and if any deterioration will consult nephrology.    Hyponatremia  -Na is 128 on admit and he has no symptoms.  -He is not hypovolemic.  If anything, very mildly hypervolemic  -Notes he drinks 1gallon of water daily, at least  -Will check urine osm, serum osm and urine Na  -Will ask him not to drink so much water  -Repeat labs q48.    Type 2 diabetes mellitus with foot ulcer, without long-term current use of insulin  -A1c 8.1  -At home takes trulicity weekly.  Hold this while inpatient.  -Add detemir 5 units qhs  -Continue diabetic diet with insulin sliding scale ac/hs      Hyperlipidemia, acquired  -Continue home statin      Essential hypertension  -BP is normal on admit  -At home takes norvasc, diltiazem, lisinopril and chlorthalidone  -Odd to take two calcium channel blockers.  -Will continue norvasc for now, but the others  -Will order prn hydralazine.        VTE Risk Mitigation (From admission, onward)         Ordered     heparin (porcine) injection 5,000 Units  Every 8 hours      07/16/20 1727     IP VTE HIGH RISK PATIENT  Once      07/16/20 1727     Place sequential compression device  Until discontinued      07/16/20 1727                      Vikas Reyes MD  Department of Hospital Medicine   [facility]

## 2020-07-17 NOTE — ASSESSMENT & PLAN NOTE
-A1c 8.1  -At home takes trulicity weekly.  Hold this while inpatient.  -Add detemir 5 units qhs  -Continue diabetic diet with insulin sliding scale ac/hs

## 2020-07-17 NOTE — ASSESSMENT & PLAN NOTE
-His father just passed away from covid infection and he has tested positive in the ER  -He remains afebrile without any symptoms at this time.  O2 sats are normal on admit and remain so.  -Will check d-dimer, ferritin and crp q48 hours  -Will keep in covid isolation  -Will treat with vitamin C and multivitamin  -If O2sats drop below 94% will start supplemental O2 and dexamethasone.

## 2020-07-17 NOTE — NURSING TRANSFER
Nursing Transfer Note      7/17/2020     Transfer To: OR    Transfer via bed    Chart send with patient: Yes

## 2020-07-17 NOTE — PROGRESS NOTES
Pharmacokinetic Assessment Follow Up: IV Vancomycin    Vancomycin serum concentration assessment(s):<1.1    The random level was drawn correctly and can be used to guide therapy at this time. The measurement is below the desired definitive target range of 10 to 20 mcg/mL.    Vancomycin Regimen Plan:    Give 1250mg Vanc once today after surgery.  Re-dose when the random level is less than 20 mcg/mL, next level to be drawn at 0400 on 7-    Drug levels (last 3 results):  Recent Labs   Lab Result Units 07/17/20  1429   Vancomycin, Random ug/mL <1.1       Pharmacy will continue to follow and monitor vancomycin.    Please contact pharmacy at extension [phone] for questions regarding this assessment.    Thank you for the consult,   Margarita Raymond       Patient brief summary:  Catalino Mcfadden is a 55 y.o. male initiated on antimicrobial therapy with IV Vancomycin for treatment of bone/joint infection    The patient's current regimen is Pulsed dosing based on daily random levels.    Drug Allergies:   Review of patient's allergies indicates:   Allergen Reactions    Morphine Hallucinations       Actual Body Weight:   112 kg    Renal Function:   Estimated Creatinine Clearance: 28.5 mL/min (A) (based on SCr of 3.8 mg/dL (H)).,     Dialysis Method (if applicable):  N/A    CBC (last 72 hours):  Recent Labs   Lab Result Units 07/16/20  1113 07/16/20  1607 07/17/20  0455   WBC K/uL 7.75 8.89  --    Hemoglobin g/dL 11.3* 11.8*  --    Hemoglobin A1C %  --   --  8.1*   Hematocrit % 36.2* 36.5*  --    Platelets K/uL 198 213  --    Gran% % 71.5 80.3*  --    Lymph% % 13.7* 8.7*  --    Mono% % 13.8 10.2  --    Eosinophil% % 0.5 0.3  --    Basophil% % 0.1 0.1  --    Differential Method  Automated Automated  --        Metabolic Panel (last 72 hours):  Recent Labs   Lab Result Units 07/16/20  1607 07/16/20  1740   Sodium mmol/L 128*  --    Sodium Urine Random mmol/L  --  32   Potassium mmol/L 5.1  --    Chloride mmol/L 97  --    CO2  mmol/L 20*  --    Glucose mg/dL 117*  --    Glucose, UA   --  Negative   BUN, Bld mg/dL 54*  --    Creatinine mg/dL 3.8*  --    Albumin g/dL 2.9*  --    Total Bilirubin mg/dL 0.7  --    Alkaline Phosphatase U/L 305*  --    AST U/L 38  --    ALT U/L 33  --        Vancomycin Administrations:  vancomycin given in the last 96 hours                     vancomycin (VANCOCIN) 2,000 mg in dextrose 5 % 500 mL IVPB (mg) 2,000 mg New Bag 07/16/20 1733                    Microbiologic Results:  Microbiology Results (last 7 days)       Procedure Component Value Units Date/Time    AFB Culture & Smear [[id number]] Collected: 07/17/20 1258    Order Status: Sent Specimen: Bone from Foot, Left Updated: 07/17/20 1409    Gram stain [[id number]] Collected: 07/17/20 1258    Order Status: Sent Specimen: Bone from Foot, Left Updated: 07/17/20 1409    Fungus culture [[id number]] Collected: 07/17/20 1258    Order Status: Sent Specimen: Bone from Foot, Left Updated: 07/17/20 1409    Culture, Anaerobe [[id number]] Collected: 07/17/20 1258    Order Status: Sent Specimen: Bone from Foot, Left Updated: 07/17/20 1408    Aerobic culture [[id number]] Collected: 07/17/20 1258    Order Status: Sent Specimen: Bone from Foot, Left Updated: 07/17/20 1408    Aerobic culture [[id number]] Collected: 07/17/20 1243    Order Status: Sent Specimen: Wound from Foot, Left Updated: 07/17/20 1407    Culture, Anaerobe [[id number]] Collected: 07/17/20 1243    Order Status: Sent Specimen: Wound from Foot, Left Updated: 07/17/20 1407    Blood Culture #2 **CANNOT BE ORDERED STAT** [[id number]] Collected: 07/16/20 1722    Order Status: Completed Specimen: Blood from Peripheral, Antecubital, Left Updated: 07/17/20 0312     Blood Culture, Routine No Growth to date    Blood Culture #1 **CANNOT BE ORDERED STAT** [[id number]] Collected: 07/16/20 1717    Order Status: Completed Specimen: Blood from Peripheral, Hand, Right Updated: 07/17/20 0312     Blood Culture, Routine No Growth to  date

## 2020-07-17 NOTE — ASSESSMENT & PLAN NOTE
-Mr. Mcfadden was admitted to inpatient status  -He had a diabetic foot wound that has failed outpatient treatment with cipro and radiographic evidence of osteomyelitis  -On admit he was afebrile with normal wbc and CRP elevated at 233.  -Doppler US with BLANK obtained.  Vascular surgery consulted and state he has adequate perfusion to heal his wounds.  No intervention needed at this time.  -Podiatry consulted and took patient for debridement and bone biopsy today.  -Will start empiric vancomycin.  -Await cultures and sensitivities.  -Will need six weeks of IV antibiotics and wound care.

## 2020-07-18 LAB
ALBUMIN SERPL BCP-MCNC: 2.2 G/DL (ref 3.5–5.2)
ALP SERPL-CCNC: 277 U/L (ref 55–135)
ALT SERPL W/O P-5'-P-CCNC: 24 U/L (ref 10–44)
ANION GAP SERPL CALC-SCNC: 9 MMOL/L (ref 8–16)
AST SERPL-CCNC: 29 U/L (ref 10–40)
BASOPHILS # BLD AUTO: 0 K/UL (ref 0–0.2)
BASOPHILS NFR BLD: 0 % (ref 0–1.9)
BILIRUB SERPL-MCNC: 0.3 MG/DL (ref 0.1–1)
BUN SERPL-MCNC: 63 MG/DL (ref 6–20)
CALCIUM SERPL-MCNC: 8.3 MG/DL (ref 8.7–10.5)
CHLORIDE SERPL-SCNC: 99 MMOL/L (ref 95–110)
CO2 SERPL-SCNC: 19 MMOL/L (ref 23–29)
CREAT SERPL-MCNC: 3.4 MG/DL (ref 0.5–1.4)
CRP SERPL-MCNC: 156.2 MG/L (ref 0–8.2)
DIFFERENTIAL METHOD: ABNORMAL
EOSINOPHIL # BLD AUTO: 0 K/UL (ref 0–0.5)
EOSINOPHIL NFR BLD: 0 % (ref 0–8)
ERYTHROCYTE [DISTWIDTH] IN BLOOD BY AUTOMATED COUNT: 12.1 % (ref 11.5–14.5)
EST. GFR  (AFRICAN AMERICAN): 22 ML/MIN/1.73 M^2
EST. GFR  (NON AFRICAN AMERICAN): 19 ML/MIN/1.73 M^2
FERRITIN SERPL-MCNC: 489 NG/ML (ref 20–300)
GLUCOSE SERPL-MCNC: 221 MG/DL (ref 70–110)
HCT VFR BLD AUTO: 32.5 % (ref 40–54)
HGB BLD-MCNC: 10.5 G/DL (ref 14–18)
IMM GRANULOCYTES # BLD AUTO: 0.03 K/UL (ref 0–0.04)
IMM GRANULOCYTES NFR BLD AUTO: 0.5 % (ref 0–0.5)
LDH SERPL L TO P-CCNC: 175 U/L (ref 110–260)
LYMPHOCYTES # BLD AUTO: 0.5 K/UL (ref 1–4.8)
LYMPHOCYTES NFR BLD: 8.3 % (ref 18–48)
MAGNESIUM SERPL-MCNC: 2 MG/DL (ref 1.6–2.6)
MCH RBC QN AUTO: 26.4 PG (ref 27–31)
MCHC RBC AUTO-ENTMCNC: 32.3 G/DL (ref 32–36)
MCV RBC AUTO: 82 FL (ref 82–98)
MONOCYTES # BLD AUTO: 0.6 K/UL (ref 0.3–1)
MONOCYTES NFR BLD: 8.9 % (ref 4–15)
NEUTROPHILS # BLD AUTO: 5.3 K/UL (ref 1.8–7.7)
NEUTROPHILS NFR BLD: 82.3 % (ref 38–73)
NRBC BLD-RTO: 0 /100 WBC
PLATELET # BLD AUTO: 227 K/UL (ref 150–350)
PMV BLD AUTO: 9.7 FL (ref 9.2–12.9)
POCT GLUCOSE: 219 MG/DL (ref 70–110)
POCT GLUCOSE: 226 MG/DL (ref 70–110)
POCT GLUCOSE: 363 MG/DL (ref 70–110)
POTASSIUM SERPL-SCNC: 5.1 MMOL/L (ref 3.5–5.1)
PROT SERPL-MCNC: 6.6 G/DL (ref 6–8.4)
RBC # BLD AUTO: 3.97 M/UL (ref 4.6–6.2)
SODIUM SERPL-SCNC: 127 MMOL/L (ref 136–145)
T4 FREE SERPL-MCNC: 1.01 NG/DL (ref 0.71–1.51)
TSH SERPL DL<=0.005 MIU/L-ACNC: 0.1 UIU/ML (ref 0.4–4)
VANCOMYCIN SERPL-MCNC: 13.3 UG/ML
WBC # BLD AUTO: 6.41 K/UL (ref 3.9–12.7)

## 2020-07-18 PROCEDURE — 84439 ASSAY OF FREE THYROXINE: CPT

## 2020-07-18 PROCEDURE — 25000003 PHARM REV CODE 250: Performed by: HOSPITALIST

## 2020-07-18 PROCEDURE — 80053 COMPREHEN METABOLIC PANEL: CPT

## 2020-07-18 PROCEDURE — 80202 ASSAY OF VANCOMYCIN: CPT

## 2020-07-18 PROCEDURE — 86140 C-REACTIVE PROTEIN: CPT

## 2020-07-18 PROCEDURE — 83615 LACTATE (LD) (LDH) ENZYME: CPT

## 2020-07-18 PROCEDURE — 25000242 PHARM REV CODE 250 ALT 637 W/ HCPCS: Performed by: HOSPITALIST

## 2020-07-18 PROCEDURE — 85025 COMPLETE CBC W/AUTO DIFF WBC: CPT

## 2020-07-18 PROCEDURE — 99232 SBSQ HOSP IP/OBS MODERATE 35: CPT | Mod: ,,, | Performed by: PODIATRIST

## 2020-07-18 PROCEDURE — 36415 COLL VENOUS BLD VENIPUNCTURE: CPT

## 2020-07-18 PROCEDURE — 82728 ASSAY OF FERRITIN: CPT

## 2020-07-18 PROCEDURE — 63600175 PHARM REV CODE 636 W HCPCS: Performed by: HOSPITALIST

## 2020-07-18 PROCEDURE — 99232 PR SUBSEQUENT HOSPITAL CARE,LEVL II: ICD-10-PCS | Mod: ,,, | Performed by: PODIATRIST

## 2020-07-18 PROCEDURE — 83735 ASSAY OF MAGNESIUM: CPT

## 2020-07-18 PROCEDURE — 94799 UNLISTED PULMONARY SVC/PX: CPT

## 2020-07-18 PROCEDURE — 84443 ASSAY THYROID STIM HORMONE: CPT

## 2020-07-18 PROCEDURE — 11000001 HC ACUTE MED/SURG PRIVATE ROOM

## 2020-07-18 PROCEDURE — 94640 AIRWAY INHALATION TREATMENT: CPT

## 2020-07-18 PROCEDURE — 94761 N-INVAS EAR/PLS OXIMETRY MLT: CPT

## 2020-07-18 RX ORDER — SODIUM CHLORIDE 9 MG/ML
INJECTION, SOLUTION INTRAVENOUS CONTINUOUS
Status: ACTIVE | OUTPATIENT
Start: 2020-07-18 | End: 2020-07-18

## 2020-07-18 RX ADMIN — HEPARIN SODIUM 5000 UNITS: 5000 INJECTION INTRAVENOUS; SUBCUTANEOUS at 09:07

## 2020-07-18 RX ADMIN — HEPARIN SODIUM 5000 UNITS: 5000 INJECTION INTRAVENOUS; SUBCUTANEOUS at 03:07

## 2020-07-18 RX ADMIN — OXYCODONE HYDROCHLORIDE AND ACETAMINOPHEN 500 MG: 500 TABLET ORAL at 08:07

## 2020-07-18 RX ADMIN — OXYCODONE HYDROCHLORIDE AND ACETAMINOPHEN 500 MG: 500 TABLET ORAL at 10:07

## 2020-07-18 RX ADMIN — DEXAMETHASONE 6 MG: 2 TABLET ORAL at 10:07

## 2020-07-18 RX ADMIN — ALBUTEROL SULFATE 2 PUFF: 90 AEROSOL, METERED RESPIRATORY (INHALATION) at 12:07

## 2020-07-18 RX ADMIN — AMLODIPINE BESYLATE 10 MG: 5 TABLET ORAL at 10:07

## 2020-07-18 RX ADMIN — ALBUTEROL SULFATE 2 PUFF: 90 AEROSOL, METERED RESPIRATORY (INHALATION) at 08:07

## 2020-07-18 RX ADMIN — SODIUM CHLORIDE: 0.9 INJECTION, SOLUTION INTRAVENOUS at 10:07

## 2020-07-18 RX ADMIN — GABAPENTIN 300 MG: 300 CAPSULE ORAL at 10:07

## 2020-07-18 RX ADMIN — VANCOMYCIN HYDROCHLORIDE 1500 MG: 1.5 INJECTION, POWDER, LYOPHILIZED, FOR SOLUTION INTRAVENOUS at 11:07

## 2020-07-18 RX ADMIN — ALBUTEROL SULFATE 2 PUFF: 90 AEROSOL, METERED RESPIRATORY (INHALATION) at 07:07

## 2020-07-18 RX ADMIN — FAMOTIDINE 20 MG: 20 TABLET ORAL at 10:07

## 2020-07-18 RX ADMIN — HEPARIN SODIUM 5000 UNITS: 5000 INJECTION INTRAVENOUS; SUBCUTANEOUS at 05:07

## 2020-07-18 RX ADMIN — GABAPENTIN 300 MG: 300 CAPSULE ORAL at 08:07

## 2020-07-18 RX ADMIN — THERA TABS 1 TABLET: TAB at 10:07

## 2020-07-18 RX ADMIN — ATORVASTATIN CALCIUM 40 MG: 40 TABLET, FILM COATED ORAL at 10:07

## 2020-07-18 RX ADMIN — INSULIN ASPART 3 UNITS: 100 INJECTION, SOLUTION INTRAVENOUS; SUBCUTANEOUS at 09:07

## 2020-07-18 RX ADMIN — ERTAPENEM 1 G: 1 INJECTION INTRAMUSCULAR; INTRAVENOUS at 10:07

## 2020-07-18 NOTE — ASSESSMENT & PLAN NOTE
-BP was normal on admit.  Mildly elevated today.  -At home takes norvasc, diltiazem, lisinopril and chlorthalidone  -Odd to take two calcium channel blockers.  -Will continue norvasc for now, but the others  -Will order prn hydralazine.

## 2020-07-18 NOTE — SUBJECTIVE & OBJECTIVE
Interval History: No acute events overnight.  No sob, cough or fever.  Eating better.  Denies pain in his foot.  No cp.    Review of Systems   Constitutional: Negative for activity change, appetite change and fever.   HENT: Negative for congestion and dental problem.    Eyes: Negative for discharge and itching.   Respiratory: Negative for apnea, cough, chest tightness and shortness of breath.    Cardiovascular: Negative for chest pain and leg swelling.   Gastrointestinal: Negative for abdominal distention and abdominal pain.   Endocrine: Negative for cold intolerance and heat intolerance.   Genitourinary: Negative for difficulty urinating and dysuria.   Musculoskeletal: Negative for arthralgias and back pain.   Skin: Positive for wound.   Allergic/Immunologic: Negative for environmental allergies and food allergies.   Neurological: Negative for dizziness, facial asymmetry and light-headedness.   Hematological: Negative for adenopathy. Does not bruise/bleed easily.   Psychiatric/Behavioral: Negative for agitation and behavioral problems.     Objective:     Vital Signs (Most Recent):  Temp: 98 °F (36.7 °C) (07/18/20 1130)  Pulse: 78 (07/18/20 1207)  Resp: 18 (07/18/20 1207)  BP: (!) 152/88 (07/18/20 1130)  SpO2: 98 % (07/18/20 1207) Vital Signs (24h Range):  Temp:  [97.1 °F (36.2 °C)-98.2 °F (36.8 °C)] 98 °F (36.7 °C)  Pulse:  [78-89] 78  Resp:  [18-20] 18  SpO2:  [95 %-100 %] 98 %  BP: (130-154)/(69-94) 152/88     Weight: 112 kg (246 lb 14.6 oz)  Body mass index is 32.58 kg/m².    Intake/Output Summary (Last 24 hours) at 7/18/2020 1547  Last data filed at 7/18/2020 1324  Gross per 24 hour   Intake 950 ml   Output 650 ml   Net 300 ml      Physical Exam  Vitals signs reviewed.   Constitutional:       General: He is not in acute distress.     Appearance: He is well-developed. He is not ill-appearing, toxic-appearing or diaphoretic.   HENT:      Head: Normocephalic and atraumatic.      Mouth/Throat:      Mouth: Mucous  membranes are moist.   Eyes:      Extraocular Movements: Extraocular movements intact.      Pupils: Pupils are equal, round, and reactive to light.   Neck:      Musculoskeletal: Normal range of motion and neck supple.   Cardiovascular:      Rate and Rhythm: Normal rate and regular rhythm.      Heart sounds: Normal heart sounds.   Pulmonary:      Effort: Pulmonary effort is normal. No respiratory distress.      Breath sounds: Normal breath sounds.   Abdominal:      General: Bowel sounds are normal. There is no distension.      Palpations: Abdomen is soft.      Tenderness: There is no abdominal tenderness.   Musculoskeletal: Normal range of motion.   Skin:     General: Skin is warm and dry.      Comments: Left foot wrapped and in DARCO boot.  Images in computer reviewed   Neurological:      Mental Status: He is alert and oriented to person, place, and time.      Cranial Nerves: No cranial nerve deficit.      Coordination: Coordination normal.   Psychiatric:         Behavior: Behavior normal.         Significant Labs: All pertinent labs within the past 24 hours have been reviewed.    Significant Imaging: I have reviewed and interpreted all pertinent imaging results/findings within the past 24 hours.

## 2020-07-18 NOTE — ASSESSMENT & PLAN NOTE
-Na was 128 on admit and he had no symptoms.  -He was not hypovolemic.  If anything, very mildly hypervolemic and noted he drinks 1gallon of water daily, at least  -On admit Usom 370, Ada 32 and Sosm 289.  -Held chlorthalidone on admit, but Na a small bit worse today.  -Will give 1L NS today and repeat Ada and Uosm  -Will check TSH.  If repeat Ada>40 and Uosm > 100 will also check AM cortisol  -Repeat labs q48.

## 2020-07-18 NOTE — PROGRESS NOTES
[facility] Hospital Medicine  Progress Note    Patient Name: Catalino Mcfadden  MRN: [id number]  Patient Class: IP- Inpatient   Admission Date: 7/16/2020  Length of Stay: 2 days  Attending Physician: Vikas Reyes MD  Primary Care Provider: Azikiwe K Lombard, MD        Subjective:     Principal Problem:Osteomyelitis of left foot        HPI:  Mr. Mcfadden is a 55 year old man with history of diabetes mellitus, diabetic foot ulcers, hypertension, hyperlipidemia and CKDIII who was found to have osteomyelitis on outside imaging and sent in by his podiatrist, Dr. Martinez.  He states that he has had ulcers on his left foot for quite some time and they had been stable.  Then 2.5 weeks ago he noted a blister on his left foot which subsequently popped.  After discussing with his podiatrist he was started on cipro.  At the time, he was in [place], [place] because his father was ill and in the hospital.  His father passed away with complications from covid-19 infection.  Because he was out of town attending to his father's illness, he was unable to follow up with Dr. Martinez until today.  Xray of his foot was obtained and showed osteomyelitis of his left 5th toe.  He noted a malodorous drainage and a mild achey pain.  He denied fevers, chills, nausea, vomiting, diarrhea, lethargy, malaise, shortness of breath or cough.  He notes he drinks at least a gallon of water daily.    Overview/Hospital Course:  No notes on file    Interval History: No acute events overnight.  No sob, cough or fever.  Eating better.  Denies pain in his foot.  No cp.    Review of Systems   Constitutional: Negative for activity change, appetite change and fever.   HENT: Negative for congestion and dental problem.    Eyes: Negative for discharge and itching.   Respiratory: Negative for apnea, cough, chest tightness and shortness of breath.    Cardiovascular: Negative for chest pain and leg swelling.   Gastrointestinal: Negative for abdominal distention and  abdominal pain.   Endocrine: Negative for cold intolerance and heat intolerance.   Genitourinary: Negative for difficulty urinating and dysuria.   Musculoskeletal: Negative for arthralgias and back pain.   Skin: Positive for wound.   Allergic/Immunologic: Negative for environmental allergies and food allergies.   Neurological: Negative for dizziness, facial asymmetry and light-headedness.   Hematological: Negative for adenopathy. Does not bruise/bleed easily.   Psychiatric/Behavioral: Negative for agitation and behavioral problems.     Objective:     Vital Signs (Most Recent):  Temp: 98 °F (36.7 °C) (07/18/20 1130)  Pulse: 78 (07/18/20 1207)  Resp: 18 (07/18/20 1207)  BP: (!) 152/88 (07/18/20 1130)  SpO2: 98 % (07/18/20 1207) Vital Signs (24h Range):  Temp:  [97.1 °F (36.2 °C)-98.2 °F (36.8 °C)] 98 °F (36.7 °C)  Pulse:  [78-89] 78  Resp:  [18-20] 18  SpO2:  [95 %-100 %] 98 %  BP: (130-154)/(69-94) 152/88     Weight: 112 kg (246 lb 14.6 oz)  Body mass index is 32.58 kg/m².    Intake/Output Summary (Last 24 hours) at 7/18/2020 1547  Last data filed at 7/18/2020 1324  Gross per 24 hour   Intake 950 ml   Output 650 ml   Net 300 ml      Physical Exam  Vitals signs reviewed.   Constitutional:       General: He is not in acute distress.     Appearance: He is well-developed. He is not ill-appearing, toxic-appearing or diaphoretic.   HENT:      Head: Normocephalic and atraumatic.      Mouth/Throat:      Mouth: Mucous membranes are moist.   Eyes:      Extraocular Movements: Extraocular movements intact.      Pupils: Pupils are equal, round, and reactive to light.   Neck:      Musculoskeletal: Normal range of motion and neck supple.   Cardiovascular:      Rate and Rhythm: Normal rate and regular rhythm.      Heart sounds: Normal heart sounds.   Pulmonary:      Effort: Pulmonary effort is normal. No respiratory distress.      Breath sounds: Normal breath sounds.   Abdominal:      General: Bowel sounds are normal. There is no  distension.      Palpations: Abdomen is soft.      Tenderness: There is no abdominal tenderness.   Musculoskeletal: Normal range of motion.   Skin:     General: Skin is warm and dry.      Comments: Left foot wrapped and in DARCO boot.  Images in computer reviewed   Neurological:      Mental Status: He is alert and oriented to person, place, and time.      Cranial Nerves: No cranial nerve deficit.      Coordination: Coordination normal.   Psychiatric:         Behavior: Behavior normal.         Significant Labs: All pertinent labs within the past 24 hours have been reviewed.    Significant Imaging: I have reviewed and interpreted all pertinent imaging results/findings within the past 24 hours.      Assessment/Plan:      * Osteomyelitis of left foot  -Mr. Mcfadden was admitted to inpatient status  -He had a diabetic foot wound that has failed outpatient treatment with cipro and radiographic evidence of osteomyelitis  -On admit he was afebrile with normal wbc and CRP elevated at 233.  -Doppler US with BLANK obtained.  Vascular surgery consulted and state he has adequate perfusion to heal his wounds.  No intervention needed at this time.  -Podiatry consulted and took patient for debridement and bone biopsy 7/18.  -Culture is growing GNR.  Await speciation and sensitivities.  Noted prior wound culture with Pseudomonas.  -Continue Vancomycin.  Start ertapenem for GNR.  -Will need six weeks of IV antibiotics and wound care.  Plan for PICC line and ID consult on Monday.  -May need wound vac.  Defer to podiatry.    Diabetic ulcer of toe of left foot  -Treatment as above    COVID-19 virus infection  -His father just passed away from covid infection and he has tested positive in the ER  -He remains afebrile without any symptoms at this time.  O2 sats are normal on admit and remain so.  -Will check d-dimer, ferritin and crp q48 hours  -Continue covid isolation  -Continue vitamin C and multivitamin  -O2 sats dropped to 94.  Continue  dexamethasone day 3/10.    Acute renal failure superimposed on stage 3 chronic kidney disease  -Baseline Cr is 2.8-3.1 and on admit Cr is mildly above baseline at 3.8.  -Could be due to mild diminished appetite vs infection.  -Renally dose medications and avoid nephrotoxic agents  -On admit I held acei and chlorthalidone  -Cr mildly improved to 3.4  -repeat labs q48h.    Hyponatremia  -Na was 128 on admit and he had no symptoms.  -He was not hypovolemic.  If anything, very mildly hypervolemic and noted he drinks 1gallon of water daily, at least  -On admit Usom 370, Ada 32 and Sosm 289.  -Held chlorthalidone on admit, but Na a small bit worse today.  -Will give 1L NS today and repeat Ada and Uosm  -Will check TSH.  If repeat Ada>40 and Uosm > 100 will also check AM cortisol  -Repeat labs q48.    Type 2 diabetes mellitus with foot ulcer, without long-term current use of insulin  -A1c 8.1  -At home takes trulicity weekly.  Hold this while inpatient.  -Increase detemir to 8 units qhs  -Continue diabetic diet with insulin sliding scale ac/hs      Hyperlipidemia, acquired  -Continue home statin      Essential hypertension  -BP was normal on admit.  Mildly elevated today.  -At home takes norvasc, diltiazem, lisinopril and chlorthalidone  -Odd to take two calcium channel blockers.  -Will continue norvasc for now, but the others  -Will order prn hydralazine.        VTE Risk Mitigation (From admission, onward)         Ordered     heparin (porcine) injection 5,000 Units  Every 8 hours      07/16/20 1727     IP VTE HIGH RISK PATIENT  Once      07/16/20 1727     Place sequential compression device  Until discontinued      07/16/20 1727                      Vikas Reyes MD  Department of Hospital Medicine   [facility]

## 2020-07-18 NOTE — PROGRESS NOTES
[facility] [place]  Podiatry  Consult Note    Patient Name: Catalino Mcfadden  MRN: [id number]  Admission Date: 7/16/2020  Hospital Length of Stay: 2 days  Attending Physician: Vikas Reyes MD  Primary Care Provider: Azikiwe K Lombard, MD     Consults  Subjective:     History of Present Illness: 56 y/o male PMH DM2, CKD admitted for left foot infection. Reports left foot ulceration x 3 weeks, thinks this started due to rubbing of brace on the lateral foot. Pt. Followed by Dr. Martinez per chart review contacted the office on 6/29/20 stating that he was heading out of town to [place] to check on his father. Rx. Cipro then sent to pharmacy which patient reports taking. He returned to [place] yesterday and was seen by . Xray left foot ordered revealing OM, gas forming infection. Sent to ED for admission. Denies N/V/F. Reports some chills.     7/18/20: S/p one day left foot debridement, bone biopsy. Denies pedal pain. CAM boot intact left foot.     Scheduled Meds:   albuterol  2 puff Inhalation Q6H    amLODIPine  10 mg Oral Daily    ascorbic acid (vitamin C)  500 mg Oral BID    atorvastatin  40 mg Oral Daily    dexAMETHasone  6 mg Oral Daily    ertapenem (INVANZ) IVPB  1 g Intravenous Q24H    famotidine  20 mg Oral Daily    gabapentin  300 mg Oral BID    heparin (porcine)  5,000 Units Subcutaneous Q8H    insulin detemir U-100  8 Units Subcutaneous QHS    multivitamin  1 tablet Oral Daily    vancomycin (VANCOCIN) IVPB  1,500 mg Intravenous Once     Continuous Infusions:   sodium chloride 0.9% 100 mL/hr at 07/18/20 1039     PRN Meds:acetaminophen, benzonatate, dextrose 50%, dextrose 50%, glucagon (human recombinant), glucose, glucose, hydrALAZINE, insulin aspart U-100, ondansetron, sodium chloride 0.9%, Pharmacy to dose Vancomycin consult **AND** vancomycin - pharmacy to dose    Review of patient's allergies indicates:   Allergen Reactions    Morphine Hallucinations        Past Medical History:    Diagnosis Date    CKD (chronic kidney disease), stage III 07/16/2020    CKD stage 3 due to type 1 diabetes mellitus     CKD stage 3 due to type 2 diabetes mellitus     Diabetes mellitus, type 2     Diabetic foot ulcer associated with diabetes mellitus due to underlying condition 07/16/2020    Diabetic foot ulcers     Hyperlipidemia     Hypertension      Past Surgical History:   Procedure Laterality Date    CERVICAL DISC SURGERY  07/11/2016    left leg orthopedic surgery Left        Family History     Problem Relation (Age of Onset)    Heart disease Father        Tobacco Use    Smoking status: Never Smoker    Smokeless tobacco: Never Used   Substance and Sexual Activity    Alcohol use: Not Currently     Alcohol/week: 0.0 standard drinks     Comment: social    Drug use: No    Sexual activity: Not on file     Review of Systems   Constitutional: Positive for activity change.   Respiratory: Negative for shortness of breath.    Cardiovascular: Negative for chest pain and leg swelling.   Gastrointestinal: Negative for nausea and vomiting.   Musculoskeletal: Positive for arthralgias.   Neurological: Positive for numbness. Negative for weakness.     Objective:     Vital Signs (Most Recent):  Temp: 97.8 °F (36.6 °C) (07/18/20 0745)  Pulse: 86 (07/18/20 0745)  Resp: 20 (07/18/20 0745)  BP: (!) 154/86 (07/18/20 0745)  SpO2: 98 % (07/18/20 0745) Vital Signs (24h Range):  Temp:  [97.1 °F (36.2 °C)-98.2 °F (36.8 °C)] 97.8 °F (36.6 °C)  Pulse:  [81-90] 86  Resp:  [16-20] 20  SpO2:  [95 %-100 %] 98 %  BP: (130-154)/(69-94) 154/86     Weight: 112 kg (246 lb 14.6 oz)  Body mass index is 32.58 kg/m².    Foot Exam    General  Orientation: alert and oriented to person, place, and time       Right Foot/Ankle     Inspection and Palpation  Tenderness: none   Swelling: none   Skin Exam: no ulcer     Neurovascular  Dorsalis pedis: 1+  Posterior tibial: 1+      Left Foot/Ankle      Inspection and Palpation  Tenderness:  metatarsals   Swelling: none   Skin Exam: ulcer;     Neurovascular  Dorsalis pedis: 1+  Posterior tibial: 1+          7/18/20:  Wound 1: Left plantar foot ulceration   Measurement: [id number]  Base: fibrogranular base   Periwound skin: HPK  Drainage: none  Probe: probes to bone.   No purulent drainage noted.                   7/17/20:  Ulcer location: Left  lateral 5th metatarsal base   Signs of infection: +malodor, local edema and erythema  Drainage: Sero-Sanguinous  Purulence: no  Crepitus/fluctuance: no  Periwound: Reddened, Macerated, Calloused  Base: Fibrotic slough  Depth: muscle  Probe to bone: yes                Laboratory:  CBC:   Recent Labs   Lab 07/18/20  0433   WBC 6.41   RBC 3.97*   HGB 10.5*   HCT 32.5*      MCV 82   MCH 26.4*   MCHC 32.3     CMP:   Recent Labs   Lab 07/18/20  0433   *   CALCIUM 8.3*   ALBUMIN 2.2*   PROT 6.6   *   K 5.1   CO2 19*   CL 99   BUN 63*   CREATININE 3.4*   ALKPHOS 277*   ALT 24   AST 29   BILITOT 0.3       Diagnostic Results:  Xray: X-Ray Foot Complete Left  Order: [id number]  Status:  Final result   Visible to patient:  No (not released) Next appt:  07/23/2020 at 10:15 AM in Podiatry (Aviva Martinez DPM) Dx:  Type II diabetes mellitus with neurol...  Details    Reading Physician Reading Date Result Priority   Segundo Brewster Jr., MD  [phone] 7/16/2020 Routine      Narrative & Impression     EXAMINATION:  XR FOOT COMPLETE 3 VIEW LEFT     CLINICAL HISTORY:  wound to lateral base of 5th met; Type 2 diabetes mellitus with other diabetic neurological complication     TECHNIQUE:  XR FOOT COMPLETE 3 VIEW LEFT     COMPARISON:  None     FINDINGS:  There is soft tissue swelling of the midfoot and forefoot with soft tissue ulceration and soft tissue gas adjacent to the base of the 5th metatarsal bone.  There is abnormal sclerosis of the base of the 5th metatarsal shaft which is suspicious for chronic osteomyelitis in light of the adjacent soft tissue  ulcer.  Throughout the midfoot there are vague areas of cystic change, there is joint disorganization and loss of normal anatomy suggestive of Charcot arthropathy.  Superimposed septic arthritis and osteomyelitis difficult to exclude with certainty.  There is severe hallux valgus deformity with areas of heterotopic ossification involving the plantar soft tissues of the foot.  Dorsal and plantar calcaneal spurs are present and there are remote fracture deformities of the distal fibula and posterior malleolus.     Impression:     Lateral midfoot soft tissue ulcer and suspected osteomyelitis of the 5th metatarsal bone.     Abnormalities throughout the midfoot which may reflect Charcot arthropathy and or septic arthritis/osteomyelitis     Posttraumatic changes of the distal tibia and fibula and arthritic changes of the 1st MTP joint.                 US Lower Extrem Arteries Bilat with BLANK  Order: [id number]  Status:  Final result   Visible to patient:  No (not released) Next appt:  07/23/2020 at 10:15 AM in Podiatry (Aviva Martinez DPM)  Details    Reading Physician Reading Date Result Priority   Porsha Mcgowan MD  [phone] 7/16/2020 STAT      Narrative & Impression     EXAMINATION:  US ARTERIAL LOWER EXTREMITY BILAT WITH BLANK (XPD)     CLINICAL HISTORY:  Eval for PVD;     TECHNIQUE:  Bilateral ankle-brachial indices as well as bilateral spectral, color and grayscale images of the large arteries of both lower extremities were performed.     COMPARISON:  None     FINDINGS:  ABIs are 0.9 on the right and 0.9 on the left.     Right lower extremity:     Common femoral artery: 96 cm/sec, triphasic     SFA proximal: 76 cm/sec, triphasic     SFA mid: 74 cm/sec, triphasic     SFA distal: 235 cm/sec, triphasic     Popliteal artery: 95 cm/sec, triphasic     Posterior tibial artery: 70 cm/sec     Anterior tibial artery: 46 cm/sec     Peroneal artery: 28 cm/sec     Left lower extremity:     Common femoral artery: 134 cm/sec,  triphasic     SFA proximal: 111 cm/sec, triphasic     SFA mid: 98 cm/sec, triphasic     SFA distal: 116 cm/sec, triphasic     Popliteal artery: 93 cm/sec, biphasic     Posterior tibial artery: 106 cm/sec     Anterior tibial artery: 44 cm/sec     Peroneal artery: 78 cm/sec     Impression:     1. ABIs measure 0.9 bilaterally.  2. Greater than doubling of velocities seen between the mid to distal right SFA which may indicate hemodynamically significant stenosis, although normal triphasic waveforms are maintained.  3. Otherwise no additional high-grade stenosis or occlusion seen involving the bilateral lower extremity arteries.                  Clinical Findings:  Left lateral foot ulceration with probe to bone.     Assessment/Plan:     Active Diagnoses:    Diagnosis Date Noted POA    PRINCIPAL PROBLEM:  Osteomyelitis of left foot [M86.9] 07/16/2020 Yes    Diabetic ulcer of toe of left foot [E11.621, L97.529] 07/16/2020 Yes    COVID-19 virus infection [U07.1] 07/16/2020 Yes    Hyponatremia [E87.1] 07/16/2020 Yes    Acute renal failure superimposed on stage 3 chronic kidney disease [N17.9, N18.3] 07/16/2020 Yes    Type 2 diabetes mellitus with foot ulcer, without long-term current use of insulin [E11.621, L97.509] 11/08/2019 Yes    Hyperlipidemia, acquired [E78.5] 12/24/2014 Yes    Essential hypertension [I10] 08/26/2014 Yes      Problems Resolved During this Admission:     S/p one day left foot debridement, bone biopsy.    Nursing orders placed for vashe wet to dry BID.     ID consult placed to further tailor abx.     Vascular surgery consulted: per vascular no intervention at this time.     Podiatry will follow.        Thank you for your consult. I will follow-up with patient. Please contact us if you have any additional questions.    Verona Whitmore DPM  Podiatry  [facility]

## 2020-07-18 NOTE — PLAN OF CARE
Remained free from fall and injury. No complains of pain. Positioned and repositioned independently. Patient ambulates to restroom. Patient is on RA. O2 sats 98%. Safety precautions maintained and call light within reach.    Problem: Fall Injury Risk  Goal: Absence of Fall and Fall-Related Injury  Outcome: Ongoing, Progressing     Problem: Infection  Goal: Infection Symptom Resolution  Outcome: Ongoing, Progressing

## 2020-07-18 NOTE — ASSESSMENT & PLAN NOTE
-Mr. Mcfadden was admitted to inpatient status  -He had a diabetic foot wound that has failed outpatient treatment with cipro and radiographic evidence of osteomyelitis  -On admit he was afebrile with normal wbc and CRP elevated at 233.  -Doppler US with BLANK obtained.  Vascular surgery consulted and state he has adequate perfusion to heal his wounds.  No intervention needed at this time.  -Podiatry consulted and took patient for debridement and bone biopsy 7/18.  -Culture is growing GNR.  Await speciation and sensitivities.  Noted prior wound culture with Pseudomonas.  -Continue Vancomycin.  Start ertapenem for GNR.  -Will need six weeks of IV antibiotics and wound care.  Plan for PICC line and ID consult on Monday.  -May need wound vac.  Defer to podiatry.

## 2020-07-18 NOTE — PROGRESS NOTES
Pharmacokinetic Assessment Follow Up: IV Vancomycin    Vancomycin serum concentration assessment(s):    The random level was drawn correctly and can be used to guide therapy at this time. The measurement is within the desired definitive target range of 10 to 20 mcg/mL.    Vancomycin Regimen Plan:  Dose 1500 mg today 7/18  Re-dose when the random level is less than 20 mcg/mL, next level to be drawn at 04:00 on 7/19    Drug levels (last 3 results):  Recent Labs   Lab Result Units 07/17/20  1429 07/18/20  0432   Vancomycin, Random ug/mL <1.1 13.3       Pharmacy will continue to follow and monitor vancomycin.    Please contact pharmacy at extension [phone] for questions regarding this assessment.    Thank you for the consult,   Lida Murray       Patient brief summary:  Catalino Mcfadden is a 55 y.o. male initiated on antimicrobial therapy with IV Vancomycin for treatment of bone/joint infection    Drug Allergies:   Review of patient's allergies indicates:   Allergen Reactions    Morphine Hallucinations       Actual Body Weight:   112 kg    Renal Function:   Estimated Creatinine Clearance: 31.8 mL/min (A) (based on SCr of 3.4 mg/dL (H)).,     Dialysis Method (if applicable):  N/A    CBC (last 72 hours):  Recent Labs   Lab Result Units 07/16/20  1113 07/16/20  1607 07/17/20  0455 07/18/20  0433   WBC K/uL 7.75 8.89  --  6.41   Hemoglobin g/dL 11.3* 11.8*  --  10.5*   Hemoglobin A1C %  --   --  8.1*  --    Hematocrit % 36.2* 36.5*  --  32.5*   Platelets K/uL 198 213  --  227   Gran% % 71.5 80.3*  --  82.3*   Lymph% % 13.7* 8.7*  --  8.3*   Mono% % 13.8 10.2  --  8.9   Eosinophil% % 0.5 0.3  --  0.0   Basophil% % 0.1 0.1  --  0.0   Differential Method  Automated Automated  --  Automated       Metabolic Panel (last 72 hours):  Recent Labs   Lab Result Units 07/16/20  1607 07/16/20  1740 07/18/20  0433   Sodium mmol/L 128*  --  127*   Sodium Urine Random mmol/L  --  32  --    Potassium mmol/L 5.1  --  5.1   Chloride mmol/L 97  --  99    CO2 mmol/L 20*  --  19*   Glucose mg/dL 117*  --  221*   Glucose, UA   --  Negative  --    BUN, Bld mg/dL 54*  --  63*   Creatinine mg/dL 3.8*  --  3.4*   Albumin g/dL 2.9*  --  2.2*   Total Bilirubin mg/dL 0.7  --  0.3   Alkaline Phosphatase U/L 305*  --  277*   AST U/L 38  --  29   ALT U/L 33  --  24   Magnesium mg/dL  --   --  2.0       Vancomycin Administrations:  vancomycin given in the last 96 hours                     vancomycin 1.25 g in dextrose 5% 250 mL IVPB (ready to mix) (mg) 1,250 mg New Bag 07/17/20 1944    vancomycin (VANCOCIN) 2,000 mg in dextrose 5 % 500 mL IVPB (mg) 2,000 mg New Bag 07/16/20 1733                    Microbiologic Results:  Microbiology Results (last 7 days)       Procedure Component Value Units Date/Time    Aerobic culture [[id number]] Collected: 07/17/20 1258    Order Status: Completed Specimen: Bone from Foot, Left Updated: 07/18/20 0606     Aerobic Bacterial Culture No growth    Narrative:      Left foot 5th metatarsal bone biopsy    Aerobic culture [[id number]] Collected: 07/17/20 1243    Order Status: Completed Specimen: Wound from Foot, Left Updated: 07/18/20 0605     Aerobic Bacterial Culture No growth    Gram stain [[id number]] Collected: 07/17/20 1258    Order Status: Completed Specimen: Bone from Foot, Left Updated: 07/17/20 2130     Gram Stain Result No organisms seen      No WBC's    Narrative:      Left foot 5th metatarsal bone biopsy    Blood Culture #2 **CANNOT BE ORDERED STAT** [[id number]] Collected: 07/16/20 1722    Order Status: Completed Specimen: Blood from Peripheral, Antecubital, Left Updated: 07/17/20 2103     Blood Culture, Routine No Growth to date      No Growth to date    Blood Culture #1 **CANNOT BE ORDERED STAT** [[id number]] Collected: 07/16/20 1717    Order Status: Completed Specimen: Blood from Peripheral, Hand, Right Updated: 07/17/20 2103     Blood Culture, Routine No Growth to date      No Growth to date    AFB Culture & Smear [[id number]] Collected:  07/17/20 1258    Order Status: Sent Specimen: Bone from Foot, Left Updated: 07/17/20 1409    Fungus culture [[id number]] Collected: 07/17/20 1258    Order Status: Sent Specimen: Bone from Foot, Left Updated: 07/17/20 1409    Culture, Anaerobe [[id number]] Collected: 07/17/20 1258    Order Status: Sent Specimen: Bone from Foot, Left Updated: 07/17/20 1408    Culture, Anaerobe [[id number]] Collected: 07/17/20 1243    Order Status: Sent Specimen: Wound from Foot, Left Updated: 07/17/20 1403

## 2020-07-18 NOTE — ASSESSMENT & PLAN NOTE
-Baseline Cr is 2.8-3.1 and on admit Cr is mildly above baseline at 3.8.  -Could be due to mild diminished appetite vs infection.  -Renally dose medications and avoid nephrotoxic agents  -On admit I held acei and chlorthalidone  -Cr mildly improved to 3.4  -repeat labs q48h.

## 2020-07-18 NOTE — PLAN OF CARE
Pt remained free from fall/injury. No complaint of pain. Meds given without difficulty. Accu check monitor with scheduled insulin. IV antibiotics tolerated well. IV saline lock. Boot remained on left foot. Dressing cdi. Isolation precautions with cluster care. Safety measures maintained. Call light within reach. Will cont to monitor  Problem: Fall Injury Risk  Goal: Absence of Fall and Fall-Related Injury  Outcome: Ongoing, Progressing  Intervention: Identify and Manage Contributors to Fall Injury Risk  Flowsheets (Taken 7/18/2020 0326)  Self-Care Promotion: independence encouraged  Medication Review/Management: medications reviewed  Intervention: Promote Injury-Free Environment  Flowsheets (Taken 7/18/2020 0326)  Safety Promotion/Fall Prevention:   assistive device/personal item within reach   commode/urinal/bedpan at bedside   Fall Risk reviewed with patient/family   lighting adjusted   medications reviewed   nonskid shoes/socks when out of bed   room near unit station   side rails raised x 2  Environmental Safety Modification:   assistive device/personal items within reach   mobility aid in reach   clutter free environment maintained   room near unit station   lighting adjusted   room organization consistent     Problem: Adult Inpatient Plan of Care  Goal: Plan of Care Review  Outcome: Ongoing, Progressing     Problem: Adult Inpatient Plan of Care  Goal: Absence of Hospital-Acquired Illness or Injury  Outcome: Ongoing, Progressing  Intervention: Identify and Manage Fall Risk  Flowsheets (Taken 7/18/2020 0326)  Safety Promotion/Fall Prevention:   assistive device/personal item within reach   commode/urinal/bedpan at bedside   Fall Risk reviewed with patient/family   lighting adjusted   medications reviewed   nonskid shoes/socks when out of bed   room near unit station   side rails raised x 2     Problem: Diabetes Comorbidity  Goal: Blood Glucose Level Within Desired Range  Outcome: Ongoing, Progressing      Problem: Adult Inpatient Plan of Care  Goal: Optimal Comfort and Wellbeing  Outcome: Ongoing, Progressing

## 2020-07-18 NOTE — ASSESSMENT & PLAN NOTE
-His father just passed away from covid infection and he has tested positive in the ER  -He remains afebrile without any symptoms at this time.  O2 sats are normal on admit and remain so.  -Will check d-dimer, ferritin and crp q48 hours  -Continue covid isolation  -Continue vitamin C and multivitamin  -O2 sats dropped to 94.  Continue dexamethasone day 3/10.

## 2020-07-18 NOTE — ASSESSMENT & PLAN NOTE
-A1c 8.1  -At home takes trulicity weekly.  Hold this while inpatient.  -Increase detemir to 8 units qhs  -Continue diabetic diet with insulin sliding scale ac/hs

## 2020-07-19 LAB
OSMOLALITY UR: 338 MOSM/KG (ref 50–1200)
POCT GLUCOSE: 372 MG/DL (ref 70–110)
SODIUM UR-SCNC: 27 MMOL/L (ref 20–250)
VANCOMYCIN SERPL-MCNC: 20 UG/ML

## 2020-07-19 PROCEDURE — 25000003 PHARM REV CODE 250: Performed by: HOSPITALIST

## 2020-07-19 PROCEDURE — 36415 COLL VENOUS BLD VENIPUNCTURE: CPT

## 2020-07-19 PROCEDURE — 94640 AIRWAY INHALATION TREATMENT: CPT

## 2020-07-19 PROCEDURE — 11000001 HC ACUTE MED/SURG PRIVATE ROOM

## 2020-07-19 PROCEDURE — 84300 ASSAY OF URINE SODIUM: CPT

## 2020-07-19 PROCEDURE — 83935 ASSAY OF URINE OSMOLALITY: CPT

## 2020-07-19 PROCEDURE — 94761 N-INVAS EAR/PLS OXIMETRY MLT: CPT

## 2020-07-19 PROCEDURE — 99232 PR SUBSEQUENT HOSPITAL CARE,LEVL II: ICD-10-PCS | Mod: ,,, | Performed by: PODIATRIST

## 2020-07-19 PROCEDURE — 25000003 PHARM REV CODE 250: Performed by: NURSE PRACTITIONER

## 2020-07-19 PROCEDURE — 25000242 PHARM REV CODE 250 ALT 637 W/ HCPCS: Performed by: HOSPITALIST

## 2020-07-19 PROCEDURE — 63600175 PHARM REV CODE 636 W HCPCS: Performed by: HOSPITALIST

## 2020-07-19 PROCEDURE — 80202 ASSAY OF VANCOMYCIN: CPT

## 2020-07-19 PROCEDURE — 99232 SBSQ HOSP IP/OBS MODERATE 35: CPT | Mod: ,,, | Performed by: PODIATRIST

## 2020-07-19 RX ORDER — METOCLOPRAMIDE 10 MG/1
10 TABLET ORAL ONCE
Status: COMPLETED | OUTPATIENT
Start: 2020-07-19 | End: 2020-07-19

## 2020-07-19 RX ORDER — LISINOPRIL 5 MG/1
10 TABLET ORAL DAILY
Status: DISCONTINUED | OUTPATIENT
Start: 2020-07-19 | End: 2020-07-22

## 2020-07-19 RX ORDER — INSULIN ASPART 100 [IU]/ML
1-10 INJECTION, SOLUTION INTRAVENOUS; SUBCUTANEOUS
Status: DISCONTINUED | OUTPATIENT
Start: 2020-07-19 | End: 2020-07-24 | Stop reason: HOSPADM

## 2020-07-19 RX ORDER — SIMETHICONE 80 MG
1 TABLET,CHEWABLE ORAL 3 TIMES DAILY PRN
Status: DISCONTINUED | OUTPATIENT
Start: 2020-07-19 | End: 2020-07-24 | Stop reason: HOSPADM

## 2020-07-19 RX ADMIN — GABAPENTIN 300 MG: 300 CAPSULE ORAL at 08:07

## 2020-07-19 RX ADMIN — OXYCODONE HYDROCHLORIDE AND ACETAMINOPHEN 500 MG: 500 TABLET ORAL at 09:07

## 2020-07-19 RX ADMIN — ALBUTEROL SULFATE 2 PUFF: 90 AEROSOL, METERED RESPIRATORY (INHALATION) at 08:07

## 2020-07-19 RX ADMIN — OXYCODONE HYDROCHLORIDE AND ACETAMINOPHEN 500 MG: 500 TABLET ORAL at 08:07

## 2020-07-19 RX ADMIN — ALBUTEROL SULFATE 2 PUFF: 90 AEROSOL, METERED RESPIRATORY (INHALATION) at 02:07

## 2020-07-19 RX ADMIN — LISINOPRIL 10 MG: 5 TABLET ORAL at 03:07

## 2020-07-19 RX ADMIN — INSULIN ASPART 10 UNITS: 100 INJECTION, SOLUTION INTRAVENOUS; SUBCUTANEOUS at 05:07

## 2020-07-19 RX ADMIN — ATORVASTATIN CALCIUM 40 MG: 40 TABLET, FILM COATED ORAL at 08:07

## 2020-07-19 RX ADMIN — THERA TABS 1 TABLET: TAB at 08:07

## 2020-07-19 RX ADMIN — ERTAPENEM 1 G: 1 INJECTION INTRAMUSCULAR; INTRAVENOUS at 09:07

## 2020-07-19 RX ADMIN — ALBUTEROL SULFATE 2 PUFF: 90 AEROSOL, METERED RESPIRATORY (INHALATION) at 07:07

## 2020-07-19 RX ADMIN — HEPARIN SODIUM 5000 UNITS: 5000 INJECTION INTRAVENOUS; SUBCUTANEOUS at 10:07

## 2020-07-19 RX ADMIN — AMLODIPINE BESYLATE 10 MG: 5 TABLET ORAL at 08:07

## 2020-07-19 RX ADMIN — HEPARIN SODIUM 5000 UNITS: 5000 INJECTION INTRAVENOUS; SUBCUTANEOUS at 01:07

## 2020-07-19 RX ADMIN — INSULIN ASPART 5 UNITS: 100 INJECTION, SOLUTION INTRAVENOUS; SUBCUTANEOUS at 09:07

## 2020-07-19 RX ADMIN — GABAPENTIN 300 MG: 300 CAPSULE ORAL at 09:07

## 2020-07-19 RX ADMIN — SIMETHICONE CHEW TAB 80 MG 80 MG: 80 TABLET ORAL at 10:07

## 2020-07-19 RX ADMIN — METOCLOPRAMIDE 10 MG: 10 TABLET ORAL at 03:07

## 2020-07-19 RX ADMIN — FAMOTIDINE 20 MG: 20 TABLET ORAL at 08:07

## 2020-07-19 RX ADMIN — HEPARIN SODIUM 5000 UNITS: 5000 INJECTION INTRAVENOUS; SUBCUTANEOUS at 05:07

## 2020-07-19 RX ADMIN — DEXAMETHASONE 6 MG: 2 TABLET ORAL at 08:07

## 2020-07-19 NOTE — ASSESSMENT & PLAN NOTE
-Na was 128 on admit and he had no symptoms.  -He was not hypovolemic.  If anything, very mildly hypervolemic and noted he drinks 1gallon of water daily, at least  -On admit Usom 370, Ada 32 and Sosm 289.  -Held chlorthalidone on admit, but Na a small bit worse 7/18 so gave 1L NS.    -TSH a bit low but FT4 normal.  Await AM cortisol.    -Repeat labs q48.

## 2020-07-19 NOTE — ASSESSMENT & PLAN NOTE
-BP was normal on admit.  Mildly elevated today.  -At home takes norvasc, diltiazem, lisinopril and chlorthalidone  -Odd to take two calcium channel blockers.  -Will continue norvasc.  Resume lisinopril.    -Continue prn hydralazine.

## 2020-07-19 NOTE — ASSESSMENT & PLAN NOTE
-His father just passed away from covid infection and he has tested positive in the ER  -He remains afebrile without any symptoms at this time.  O2 sats are normal on admit and remain so.  -Ferritin 485-489  -156  -167-175.  Repeat q48h  -Continue covid isolation  -Continue vitamin C and multivitamin  -O2 sats dropped to 94.  Continue dexamethasone day 4/10.

## 2020-07-19 NOTE — SUBJECTIVE & OBJECTIVE
Interval History: No acute events overnight.  No sob, cough or fever.  Eating better.  Denies pain in his foot.  No cp.    Review of Systems   Constitutional: Negative for activity change, appetite change and fever.   HENT: Negative for congestion and dental problem.    Eyes: Negative for discharge and itching.   Respiratory: Negative for apnea, cough, chest tightness and shortness of breath.    Cardiovascular: Negative for chest pain and leg swelling.   Gastrointestinal: Negative for abdominal distention and abdominal pain.   Endocrine: Negative for cold intolerance and heat intolerance.   Genitourinary: Negative for difficulty urinating and dysuria.   Musculoskeletal: Negative for arthralgias and back pain.   Skin: Positive for wound.   Allergic/Immunologic: Negative for environmental allergies and food allergies.   Neurological: Negative for dizziness, facial asymmetry and light-headedness.   Hematological: Negative for adenopathy. Does not bruise/bleed easily.   Psychiatric/Behavioral: Negative for agitation and behavioral problems.     Objective:     Vital Signs (Most Recent):  Temp: 98.1 °F (36.7 °C) (07/19/20 1151)  Pulse: 81 (07/19/20 1151)  Resp: 20 (07/19/20 1151)  BP: (!) 168/86 (07/19/20 1151)  SpO2: 96 % (07/19/20 1151) Vital Signs (24h Range):  Temp:  [97.4 °F (36.3 °C)-98.1 °F (36.7 °C)] 98.1 °F (36.7 °C)  Pulse:  [75-85] 81  Resp:  [18-20] 20  SpO2:  [94 %-98 %] 96 %  BP: (126-168)/(71-86) 168/86     Weight: 112 kg (246 lb 14.6 oz)  Body mass index is 32.58 kg/m².    Intake/Output Summary (Last 24 hours) at 7/19/2020 1307  Last data filed at 7/19/2020 0820  Gross per 24 hour   Intake 860 ml   Output 900 ml   Net -40 ml      Physical Exam  Vitals signs reviewed.   Constitutional:       General: He is not in acute distress.     Appearance: He is well-developed. He is not ill-appearing, toxic-appearing or diaphoretic.   HENT:      Head: Normocephalic and atraumatic.      Mouth/Throat:      Mouth: Mucous  membranes are moist.   Eyes:      Extraocular Movements: Extraocular movements intact.      Pupils: Pupils are equal, round, and reactive to light.   Neck:      Musculoskeletal: Normal range of motion and neck supple.   Cardiovascular:      Rate and Rhythm: Normal rate and regular rhythm.      Heart sounds: Normal heart sounds.   Pulmonary:      Effort: Pulmonary effort is normal. No respiratory distress.      Breath sounds: Normal breath sounds.   Abdominal:      General: Bowel sounds are normal. There is no distension.      Palpations: Abdomen is soft.      Tenderness: There is no abdominal tenderness.   Musculoskeletal: Normal range of motion.   Skin:     General: Skin is warm and dry.      Comments: Left foot wrapped and in DAR[employer] boot.  Images in computer reviewed   Neurological:      Mental Status: He is alert and oriented to person, place, and time.      Cranial Nerves: No cranial nerve deficit.      Coordination: Coordination normal.   Psychiatric:         Behavior: Behavior normal.         Significant Labs: All pertinent labs within the past 24 hours have been reviewed.    Significant Imaging: I have reviewed and interpreted all pertinent imaging results/findings within the past 24 hours.

## 2020-07-19 NOTE — NURSING
Pt resting in bed watching tv. No distress. No complaint of pain. Scheduled meds given without difficulty. IV saline lock. Dressing changed to left foot, wet to dry, cdi. Tele monitor #[id number]. Accu checks monitor. Safety measures maintained. Call light within reach. Will cont to monitor.

## 2020-07-19 NOTE — PROGRESS NOTES
Pharmacokinetic Assessment Follow Up: IV Vancomycin    Vancomycin serum concentration assessment(s):    The random level was drawn correctly and can be used to guide therapy at this time. The measurement is within the desired definitive target range of 10 to 20 mcg/mL.    Vancomycin Regimen Plan:    Re-dose when the random level is less than 20 mcg/mL, next level to be drawn at 04:00 on 7/20    Drug levels (last 3 results):  Recent Labs   Lab Result Units 07/17/20  1429 07/18/20  0432 07/19/20  0515   Vancomycin, Random ug/mL <1.1 13.3 20.0       Pharmacy will continue to follow and monitor vancomycin.    Please contact pharmacy at extension [phone] for questions regarding this assessment.    Thank you for the consult,   Lida Murray       Patient brief summary:  Catalino Mcfadden is a 55 y.o. male initiated on antimicrobial therapy with IV Vancomycin for treatment of bone/joint infection    Drug Allergies:   Review of patient's allergies indicates:   Allergen Reactions    Morphine Hallucinations       Actual Body Weight:   112 kg    Renal Function:   Estimated Creatinine Clearance: 31.8 mL/min (A) (based on SCr of 3.4 mg/dL (H)).,     Dialysis Method (if applicable):  N/A    CBC (last 72 hours):  Recent Labs   Lab Result Units 07/16/20  1113 07/16/20  1607 07/17/20  0455 07/18/20  0433   WBC K/uL 7.75 8.89  --  6.41   Hemoglobin g/dL 11.3* 11.8*  --  10.5*   Hemoglobin A1C %  --   --  8.1*  --    Hematocrit % 36.2* 36.5*  --  32.5*   Platelets K/uL 198 213  --  227   Gran% % 71.5 80.3*  --  82.3*   Lymph% % 13.7* 8.7*  --  8.3*   Mono% % 13.8 10.2  --  8.9   Eosinophil% % 0.5 0.3  --  0.0   Basophil% % 0.1 0.1  --  0.0   Differential Method  Automated Automated  --  Automated       Metabolic Panel (last 72 hours):  Recent Labs   Lab Result Units 07/16/20  1607 07/16/20  1740 07/18/20  0433   Sodium mmol/L 128*  --  127*   Sodium Urine Random mmol/L  --  32  --    Potassium mmol/L 5.1  --  5.1   Chloride mmol/L 97  --  99   CO2  mmol/L 20*  --  19*   Glucose mg/dL 117*  --  221*   Glucose, UA   --  Negative  --    BUN, Bld mg/dL 54*  --  63*   Creatinine mg/dL 3.8*  --  3.4*   Albumin g/dL 2.9*  --  2.2*   Total Bilirubin mg/dL 0.7  --  0.3   Alkaline Phosphatase U/L 305*  --  277*   AST U/L 38  --  29   ALT U/L 33  --  24   Magnesium mg/dL  --   --  2.0       Vancomycin Administrations:  vancomycin given in the last 96 hours                     vancomycin 1.5 g in dextrose 5 % 250 mL IVPB (ready to mix) (mg) 1,500 mg New Bag 07/18/20 1153    vancomycin 1.25 g in dextrose 5% 250 mL IVPB (ready to mix) (mg) 1,250 mg New Bag 07/17/20 1944    vancomycin (VANCOCIN) 2,000 mg in dextrose 5 % 500 mL IVPB (mg) 2,000 mg New Bag 07/16/20 1733                    Microbiologic Results:  Microbiology Results (last 7 days)       Procedure Component Value Units Date/Time    Culture, Anaerobe [[id number]] Collected: 07/17/20 1258    Order Status: Completed Specimen: Bone from Foot, Left Updated: 07/19/20 0754     Anaerobic Culture Culture in progress    Narrative:      Left foot 5th metatarsal bone biopsy    Culture, Anaerobe [[id number]] Collected: 07/17/20 1243    Order Status: Completed Specimen: Wound from Foot, Left Updated: 07/19/20 0749     Anaerobic Culture Culture in progress    Aerobic culture [[id number]] Collected: 07/17/20 1258    Order Status: Completed Specimen: Bone from Foot, Left Updated: 07/19/20 0555     Aerobic Bacterial Culture No growth    Narrative:      Left foot 5th metatarsal bone biopsy    Aerobic culture [[id number]] Collected: 07/17/20 1243    Order Status: Completed Specimen: Wound from Foot, Left Updated: 07/19/20 0551     Aerobic Bacterial Culture No growth      No significant isolate to date    Blood Culture #1 **CANNOT BE ORDERED STAT** [[id number]] Collected: 07/16/20 1717    Order Status: Completed Specimen: Blood from Peripheral, Hand, Right Updated: 07/18/20 2103     Blood Culture, Routine No Growth to date      No Growth  to date      No Growth to date    Blood Culture #2 **CANNOT BE ORDERED STAT** [[id number]] Collected: 07/16/20 1722    Order Status: Completed Specimen: Blood from Peripheral, Antecubital, Left Updated: 07/18/20 2103     Blood Culture, Routine No Growth to date      No Growth to date      No Growth to date    AFB Culture & Smear [[id number]] Collected: 07/17/20 1258    Order Status: Sent Specimen: Bone from Foot, Left Updated: 07/18/20 1640    Gram stain [[id number]] Collected: 07/17/20 1258    Order Status: Completed Specimen: Bone from Foot, Left Updated: 07/17/20 2130     Gram Stain Result No organisms seen      No WBC's    Narrative:      Left foot 5th metatarsal bone biopsy    Fungus culture [[id number]] Collected: 07/17/20 1258    Order Status: Sent Specimen: Bone from Foot, Left Updated: 07/17/20 5459

## 2020-07-19 NOTE — PROGRESS NOTES
[facility] Hospital Medicine  Progress Note    Patient Name: Catalino Mcfadden  MRN: [id number]  Patient Class: IP- Inpatient   Admission Date: 7/16/2020  Length of Stay: 3 days  Attending Physician: Vikas Reyes MD  Primary Care Provider: Azikiwe K Lombard, MD        Subjective:     Principal Problem:Osteomyelitis of left foot        HPI:  Mr. Mcfadden is a 55 year old man with history of diabetes mellitus, diabetic foot ulcers, hypertension, hyperlipidemia and CKDIII who was found to have osteomyelitis on outside imaging and sent in by his podiatrist, Dr. Martinez.  He states that he has had ulcers on his left foot for quite some time and they had been stable.  Then 2.5 weeks ago he noted a blister on his left foot which subsequently popped.  After discussing with his podiatrist he was started on cipro.  At the time, he was in [place], [place] because his father was ill and in the hospital.  His father passed away with complications from covid-19 infection.  Because he was out of town attending to his father's illness, he was unable to follow up with Dr. Martinez until today.  Xray of his foot was obtained and showed osteomyelitis of his left 5th toe.  He noted a malodorous drainage and a mild achey pain.  He denied fevers, chills, nausea, vomiting, diarrhea, lethargy, malaise, shortness of breath or cough.  He notes he drinks at least a gallon of water daily.    Overview/Hospital Course:  No notes on file    Interval History: No acute events overnight.  No sob, cough or fever.  Eating better.  Denies pain in his foot.  No cp.    Review of Systems   Constitutional: Negative for activity change, appetite change and fever.   HENT: Negative for congestion and dental problem.    Eyes: Negative for discharge and itching.   Respiratory: Negative for apnea, cough, chest tightness and shortness of breath.    Cardiovascular: Negative for chest pain and leg swelling.   Gastrointestinal: Negative for abdominal distention and  abdominal pain.   Endocrine: Negative for cold intolerance and heat intolerance.   Genitourinary: Negative for difficulty urinating and dysuria.   Musculoskeletal: Negative for arthralgias and back pain.   Skin: Positive for wound.   Allergic/Immunologic: Negative for environmental allergies and food allergies.   Neurological: Negative for dizziness, facial asymmetry and light-headedness.   Hematological: Negative for adenopathy. Does not bruise/bleed easily.   Psychiatric/Behavioral: Negative for agitation and behavioral problems.     Objective:     Vital Signs (Most Recent):  Temp: 98.1 °F (36.7 °C) (07/19/20 1151)  Pulse: 81 (07/19/20 1151)  Resp: 20 (07/19/20 1151)  BP: (!) 168/86 (07/19/20 1151)  SpO2: 96 % (07/19/20 1151) Vital Signs (24h Range):  Temp:  [97.4 °F (36.3 °C)-98.1 °F (36.7 °C)] 98.1 °F (36.7 °C)  Pulse:  [75-85] 81  Resp:  [18-20] 20  SpO2:  [94 %-98 %] 96 %  BP: (126-168)/(71-86) 168/86     Weight: 112 kg (246 lb 14.6 oz)  Body mass index is 32.58 kg/m².    Intake/Output Summary (Last 24 hours) at 7/19/2020 1307  Last data filed at 7/19/2020 0820  Gross per 24 hour   Intake 860 ml   Output 900 ml   Net -40 ml      Physical Exam  Vitals signs reviewed.   Constitutional:       General: He is not in acute distress.     Appearance: He is well-developed. He is not ill-appearing, toxic-appearing or diaphoretic.   HENT:      Head: Normocephalic and atraumatic.      Mouth/Throat:      Mouth: Mucous membranes are moist.   Eyes:      Extraocular Movements: Extraocular movements intact.      Pupils: Pupils are equal, round, and reactive to light.   Neck:      Musculoskeletal: Normal range of motion and neck supple.   Cardiovascular:      Rate and Rhythm: Normal rate and regular rhythm.      Heart sounds: Normal heart sounds.   Pulmonary:      Effort: Pulmonary effort is normal. No respiratory distress.      Breath sounds: Normal breath sounds.   Abdominal:      General: Bowel sounds are normal. There is no  distension.      Palpations: Abdomen is soft.      Tenderness: There is no abdominal tenderness.   Musculoskeletal: Normal range of motion.   Skin:     General: Skin is warm and dry.      Comments: Left foot wrapped and in DARCO boot.  Images in computer reviewed   Neurological:      Mental Status: He is alert and oriented to person, place, and time.      Cranial Nerves: No cranial nerve deficit.      Coordination: Coordination normal.   Psychiatric:         Behavior: Behavior normal.         Significant Labs: All pertinent labs within the past 24 hours have been reviewed.    Significant Imaging: I have reviewed and interpreted all pertinent imaging results/findings within the past 24 hours.      Assessment/Plan:      * Osteomyelitis of left foot  -Mr. Mcfadden was admitted to inpatient status  -He had a diabetic foot wound that has failed outpatient treatment with cipro and radiographic evidence of osteomyelitis  -On admit he was afebrile with normal wbc and CRP elevated at 233.  -Doppler US with BLANK obtained.  Vascular surgery consulted and state he has adequate perfusion to heal his wounds.  No intervention needed at this time.  -Podiatry consulted and took patient for debridement and bone biopsy 7/18.  -Culture is growing GNR.  Await speciation and sensitivities.  Noted prior wound culture with Pseudomonas.  -Continue Vancomycin.  Started ertapenem for GNR 7/18.  -Will need six weeks of IV antibiotics and wound care.  Will consult picc nurse today.  Plan ID consult on Monday.  -May need wound vac and repeat debridement.  Will discuss with Dr. Whitmore today to clarify plan.  Defer to podiatry.    Diabetic ulcer of toe of left foot  -Treatment as above    COVID-19 virus infection  -His father just passed away from covid infection and he has tested positive in the ER  -He remains afebrile without any symptoms at this time.  O2 sats are normal on admit and remain so.  -Ferritin 485-489  -156  -167-175.   Repeat q48h  -Continue covid isolation  -Continue vitamin C and multivitamin  -O2 sats dropped to 94.  Continue dexamethasone day 4/10.  -Not on supplemental O2 at this time.    Acute renal failure superimposed on stage 3 chronic kidney disease  -Baseline Cr is 2.8-3.1 and on admit Cr is mildly above baseline at 3.8.  -Could be due to mild diminished appetite vs infection.  -Renally dose medications and avoid nephrotoxic agents  -On admit I held acei and chlorthalidone  -Cr mildly improved to 3.4  -repeat labs q48h.    Hyponatremia  -Na was 128 on admit and he had no symptoms.  -He was not hypovolemic.  If anything, very mildly hypervolemic and noted he drinks 1gallon of water daily, at least  -On admit Usom 370, Ada 32 and Sosm 289.  -Held chlorthalidone on admit, but Na a small bit worse 7/18 so gave 1L NS.    -TSH a bit low but FT4 normal.  Await AM cortisol.    -Repeat labs q48.    Type 2 diabetes mellitus with foot ulcer, without long-term current use of insulin  -A1c 8.1  -At home takes trulicity weekly.  Hold this while inpatient.  -Increase detemir to 15 units qhs and change to moderate dose ISS.  -Continue diabetic diet with insulin sliding scale ac/hs    Hyperlipidemia, acquired  -Continue home statin    Essential hypertension  -BP was normal on admit.  Mildly elevated today.  -At home takes norvasc, diltiazem, lisinopril and chlorthalidone  -Odd to take two calcium channel blockers.  -Will continue norvasc.  Resume lisinopril.    -Continue prn hydralazine.        VTE Risk Mitigation (From admission, onward)         Ordered     heparin (porcine) injection 5,000 Units  Every 8 hours      07/16/20 1727     IP VTE HIGH RISK PATIENT  Once      07/16/20 1727     Place sequential compression device  Until discontinued      07/16/20 1727                      Vikas Reyes MD  Department of Hospital Medicine   [facility] [place]

## 2020-07-19 NOTE — ASSESSMENT & PLAN NOTE
-Mr. Mcfadden was admitted to inpatient status  -He had a diabetic foot wound that has failed outpatient treatment with cipro and radiographic evidence of osteomyelitis  -On admit he was afebrile with normal wbc and CRP elevated at 233.  -Doppler US with BLANK obtained.  Vascular surgery consulted and state he has adequate perfusion to heal his wounds.  No intervention needed at this time.  -Podiatry consulted and took patient for debridement and bone biopsy 7/18.  -Culture is growing GNR.  Await speciation and sensitivities.  Noted prior wound culture with Pseudomonas.  -Continue Vancomycin.  Started ertapenem for GNR 7/18.  -Will need six weeks of IV antibiotics and wound care.  Will consult picc nurse today.  Plan ID consult on Monday.  -May need wound vac and repeat debridement.  Will discuss with Dr. Whitmore today to clarify plan.  Defer to podiatry.

## 2020-07-19 NOTE — PROGRESS NOTES
Pt lying in bed resting quietly. Requesting something for hiccups. Pt states he gets hiccups after anesthesia. Dr Reyes notified, will order something.

## 2020-07-19 NOTE — ASSESSMENT & PLAN NOTE
-A1c 8.1  -At home takes trulicity weekly.  Hold this while inpatient.  -Increase detemir to 10 units qhs and change to moderate dose ISS.  -Continue diabetic diet with insulin sliding scale ac/hs

## 2020-07-19 NOTE — PROGRESS NOTES
[facility] [place]  Podiatry  Consult Note    Patient Name: Catalino Mcfadden  MRN: [id number]  Admission Date: 7/16/2020  Hospital Length of Stay: 3 days  Attending Physician: Vikas Reyes MD  Primary Care Provider: Azikiwe K Lombard, MD     Consults  Subjective:     History of Present Illness: 56 y/o male PMH DM2, CKD admitted for left foot infection. Reports left foot ulceration x 3 weeks, thinks this started due to rubbing of brace on the lateral foot. Pt. Followed by Dr. Martinez per chart review contacted the office on 6/29/20 stating that he was heading out of town to [place] to check on his father. Rx. Cipro then sent to pharmacy which patient reports taking. He returned to [place] yesterday and was seen by . Xray left foot ordered revealing OM, gas forming infection. Sent to ED for admission. Denies N/V/F. Reports some chills.     7/18/20: S/p one day left foot debridement, bone biopsy. Denies pedal pain. CAM boot intact left foot.     7/19/20: S/p 2 days left foot debridement, bone biopsy. CAM boot in place left foot.     Scheduled Meds:   albuterol  2 puff Inhalation Q6H    amLODIPine  10 mg Oral Daily    ascorbic acid (vitamin C)  500 mg Oral BID    atorvastatin  40 mg Oral Daily    dexAMETHasone  6 mg Oral Daily    ertapenem (INVANZ) IVPB  1 g Intravenous Q24H    famotidine  20 mg Oral Daily    gabapentin  300 mg Oral BID    heparin (porcine)  5,000 Units Subcutaneous Q8H    insulin detemir U-100  8 Units Subcutaneous QHS    multivitamin  1 tablet Oral Daily     Continuous Infusions:    PRN Meds:acetaminophen, benzonatate, dextrose 50%, dextrose 50%, glucagon (human recombinant), glucose, glucose, hydrALAZINE, insulin aspart U-100, ondansetron, sodium chloride 0.9%, Pharmacy to dose Vancomycin consult **AND** vancomycin - pharmacy to dose    Review of patient's allergies indicates:   Allergen Reactions    Morphine Hallucinations        Past Medical History:   Diagnosis Date    CKD  (chronic kidney disease), stage III 07/16/2020    CKD stage 3 due to type 1 diabetes mellitus     CKD stage 3 due to type 2 diabetes mellitus     Diabetes mellitus, type 2     Diabetic foot ulcer associated with diabetes mellitus due to underlying condition 07/16/2020    Diabetic foot ulcers     Hyperlipidemia     Hypertension      Past Surgical History:   Procedure Laterality Date    CERVICAL DISC SURGERY  07/11/2016    left leg orthopedic surgery Left        Family History     Problem Relation (Age of Onset)    Heart disease Father        Tobacco Use    Smoking status: Never Smoker    Smokeless tobacco: Never Used   Substance and Sexual Activity    Alcohol use: Not Currently     Alcohol/week: 0.0 standard drinks     Comment: social    Drug use: No    Sexual activity: Not on file     Review of Systems   Constitutional: Positive for activity change.   Respiratory: Negative for shortness of breath.    Cardiovascular: Negative for chest pain and leg swelling.   Gastrointestinal: Negative for nausea and vomiting.   Musculoskeletal: Positive for arthralgias.   Neurological: Positive for numbness. Negative for weakness.     Objective:     Vital Signs (Most Recent):  Temp: 98 °F (36.7 °C) (07/19/20 0749)  Pulse: 82 (07/19/20 0834)  Resp: 20 (07/19/20 0834)  BP: (!) 158/77 (07/19/20 0749)  SpO2: 95 % (07/19/20 0834) Vital Signs (24h Range):  Temp:  [97.4 °F (36.3 °C)-98 °F (36.7 °C)] 98 °F (36.7 °C)  Pulse:  [75-87] 82  Resp:  [18-20] 20  SpO2:  [94 %-98 %] 95 %  BP: (126-160)/(71-88) 158/77     Weight: 112 kg (246 lb 14.6 oz)  Body mass index is 32.58 kg/m².    Foot Exam    General  Orientation: alert and oriented to person, place, and time       Right Foot/Ankle     Inspection and Palpation  Tenderness: none   Swelling: none   Skin Exam: no ulcer     Neurovascular  Dorsalis pedis: 1+  Posterior tibial: 1+      Left Foot/Ankle      Inspection and Palpation  Tenderness: metatarsals   Swelling: none   Skin  Exam: ulcer;     Neurovascular  Dorsalis pedis: 1+  Posterior tibial: 1+          7/19/20:    Sutures intact skin edges well coapted.   Wound 1: Left plantar foot ulceration   Measurement: [id number]  Base: fibrogranular base   Periwound skin: HPK  Drainage: none  Probe: probes to bone.   No purulent drainage noted.           7/18/20:  Wound 1: Left plantar foot ulceration   Measurement: [id number]  Base: fibrogranular base   Periwound skin: HPK  Drainage: none  Probe: probes to bone.   No purulent drainage noted.                   7/17/20:  Ulcer location: Left  lateral 5th metatarsal base   Signs of infection: +malodor, local edema and erythema  Drainage: Sero-Sanguinous  Purulence: no  Crepitus/fluctuance: no  Periwound: Reddened, Macerated, Calloused  Base: Fibrotic slough  Depth: muscle  Probe to bone: yes                Laboratory:  CBC:   Recent Labs   Lab 07/18/20  0433   WBC 6.41   RBC 3.97*   HGB 10.5*   HCT 32.5*      MCV 82   MCH 26.4*   MCHC 32.3     CMP:   Recent Labs   Lab 07/18/20  0433   *   CALCIUM 8.3*   ALBUMIN 2.2*   PROT 6.6   *   K 5.1   CO2 19*   CL 99   BUN 63*   CREATININE 3.4*   ALKPHOS 277*   ALT 24   AST 29   BILITOT 0.3       Diagnostic Results:  Xray: X-Ray Foot Complete Left  Order: [id number]  Status:  Final result   Visible to patient:  No (not released) Next appt:  07/23/2020 at 10:15 AM in Podiatry (Aviva Martinez DPM) Dx:  Type II diabetes mellitus with neurol...  Details    Reading Physician Reading Date Result Priority   Segundo Brewster Jr., MD  [phone] 7/16/2020 Routine      Narrative & Impression     EXAMINATION:  XR FOOT COMPLETE 3 VIEW LEFT     CLINICAL HISTORY:  wound to lateral base of 5th met; Type 2 diabetes mellitus with other diabetic neurological complication     TECHNIQUE:  XR FOOT COMPLETE 3 VIEW LEFT     COMPARISON:  None     FINDINGS:  There is soft tissue swelling of the midfoot and forefoot with soft tissue ulceration and soft  tissue gas adjacent to the base of the 5th metatarsal bone.  There is abnormal sclerosis of the base of the 5th metatarsal shaft which is suspicious for chronic osteomyelitis in light of the adjacent soft tissue ulcer.  Throughout the midfoot there are vague areas of cystic change, there is joint disorganization and loss of normal anatomy suggestive of Charcot arthropathy.  Superimposed septic arthritis and osteomyelitis difficult to exclude with certainty.  There is severe hallux valgus deformity with areas of heterotopic ossification involving the plantar soft tissues of the foot.  Dorsal and plantar calcaneal spurs are present and there are remote fracture deformities of the distal fibula and posterior malleolus.     Impression:     Lateral midfoot soft tissue ulcer and suspected osteomyelitis of the 5th metatarsal bone.     Abnormalities throughout the midfoot which may reflect Charcot arthropathy and or septic arthritis/osteomyelitis     Posttraumatic changes of the distal tibia and fibula and arthritic changes of the 1st MTP joint.                 US Lower Extrem Arteries Bilat with BLANK  Order: [id number]  Status:  Final result   Visible to patient:  No (not released) Next appt:  07/23/2020 at 10:15 AM in Podiatry (Aviva Martinez DPM)  Details    Reading Physician Reading Date Result Priority   Porsha Mcgowan MD  [phone] 7/16/2020 STAT      Narrative & Impression     EXAMINATION:  US ARTERIAL LOWER EXTREMITY BILAT WITH BLANK (XPD)     CLINICAL HISTORY:  Eval for PVD;     TECHNIQUE:  Bilateral ankle-brachial indices as well as bilateral spectral, color and grayscale images of the large arteries of both lower extremities were performed.     COMPARISON:  None     FINDINGS:  ABIs are 0.9 on the right and 0.9 on the left.     Right lower extremity:     Common femoral artery: 96 cm/sec, triphasic     SFA proximal: 76 cm/sec, triphasic     SFA mid: 74 cm/sec, triphasic     SFA distal: 235 cm/sec,  triphasic     Popliteal artery: 95 cm/sec, triphasic     Posterior tibial artery: 70 cm/sec     Anterior tibial artery: 46 cm/sec     Peroneal artery: 28 cm/sec     Left lower extremity:     Common femoral artery: 134 cm/sec, triphasic     SFA proximal: 111 cm/sec, triphasic     SFA mid: 98 cm/sec, triphasic     SFA distal: 116 cm/sec, triphasic     Popliteal artery: 93 cm/sec, biphasic     Posterior tibial artery: 106 cm/sec     Anterior tibial artery: 44 cm/sec     Peroneal artery: 78 cm/sec     Impression:     1. ABIs measure 0.9 bilaterally.  2. Greater than doubling of velocities seen between the mid to distal right SFA which may indicate hemodynamically significant stenosis, although normal triphasic waveforms are maintained.  3. Otherwise no additional high-grade stenosis or occlusion seen involving the bilateral lower extremity arteries.                  Clinical Findings:  Left lateral foot ulceration with probe to bone.     Assessment/Plan:     Active Diagnoses:    Diagnosis Date Noted POA    PRINCIPAL PROBLEM:  Osteomyelitis of left foot [M86.9] 07/16/2020 Yes    Diabetic ulcer of toe of left foot [E11.621, L97.529] 07/16/2020 Yes    COVID-19 virus infection [U07.1] 07/16/2020 Yes    Hyponatremia [E87.1] 07/16/2020 Yes    Acute renal failure superimposed on stage 3 chronic kidney disease [N17.9, N18.3] 07/16/2020 Yes    Type 2 diabetes mellitus with foot ulcer, without long-term current use of insulin [E11.621, L97.509] 11/08/2019 Yes    Hyperlipidemia, acquired [E78.5] 12/24/2014 Yes    Essential hypertension [I10] 08/26/2014 Yes      Problems Resolved During this Admission:     S/p two days left foot debridement, bone biopsy. No purulent drainage noted to left foot ulceration.     Nursing orders placed for vashe wet to dry BID.     ID consult placed to further tailor abx.     Vascular surgery consulted: per vascular no intervention at this time.    Will consider wound VAC application pending  clinical course.      Podiatry will follow.        Thank you for your consult. I will follow-up with patient. Please contact us if you have any additional questions.    Verona Whitmore DPM  Podiatry  [facility]

## 2020-07-20 PROBLEM — M86.172 ACUTE OSTEOMYELITIS OF METATARSAL BONE OF LEFT FOOT: Status: ACTIVE | Noted: 2020-07-16

## 2020-07-20 LAB
ALBUMIN SERPL BCP-MCNC: 2.2 G/DL (ref 3.5–5.2)
ALP SERPL-CCNC: 314 U/L (ref 55–135)
ALT SERPL W/O P-5'-P-CCNC: 34 U/L (ref 10–44)
ANION GAP SERPL CALC-SCNC: 7 MMOL/L (ref 8–16)
AST SERPL-CCNC: 33 U/L (ref 10–40)
BACTERIA SPEC AEROBE CULT: ABNORMAL
BACTERIA SPEC AEROBE CULT: NORMAL
BACTERIA SPEC ANAEROBE CULT: ABNORMAL
BACTERIA SPEC ANAEROBE CULT: ABNORMAL
BASOPHILS # BLD AUTO: 0.02 K/UL (ref 0–0.2)
BASOPHILS NFR BLD: 0.1 % (ref 0–1.9)
BILIRUB SERPL-MCNC: 0.2 MG/DL (ref 0.1–1)
BUN SERPL-MCNC: 82 MG/DL (ref 6–20)
CALCIUM SERPL-MCNC: 8.2 MG/DL (ref 8.7–10.5)
CHLORIDE SERPL-SCNC: 97 MMOL/L (ref 95–110)
CO2 SERPL-SCNC: 21 MMOL/L (ref 23–29)
CORTIS SERPL-MCNC: 1.5 UG/DL
CREAT SERPL-MCNC: 3.3 MG/DL (ref 0.5–1.4)
CRP SERPL-MCNC: 43.9 MG/L (ref 0–8.2)
DIFFERENTIAL METHOD: ABNORMAL
EOSINOPHIL # BLD AUTO: 0 K/UL (ref 0–0.5)
EOSINOPHIL NFR BLD: 0 % (ref 0–8)
ERYTHROCYTE [DISTWIDTH] IN BLOOD BY AUTOMATED COUNT: 11.9 % (ref 11.5–14.5)
EST. GFR  (AFRICAN AMERICAN): 23 ML/MIN/1.73 M^2
EST. GFR  (NON AFRICAN AMERICAN): 20 ML/MIN/1.73 M^2
FERRITIN SERPL-MCNC: 453 NG/ML (ref 20–300)
GLUCOSE SERPL-MCNC: 359 MG/DL (ref 70–110)
HCT VFR BLD AUTO: 33 % (ref 40–54)
HGB BLD-MCNC: 10.9 G/DL (ref 14–18)
IMM GRANULOCYTES # BLD AUTO: 0.2 K/UL (ref 0–0.04)
IMM GRANULOCYTES NFR BLD AUTO: 1.4 % (ref 0–0.5)
LDH SERPL L TO P-CCNC: 231 U/L (ref 110–260)
LYMPHOCYTES # BLD AUTO: 0.6 K/UL (ref 1–4.8)
LYMPHOCYTES NFR BLD: 4.2 % (ref 18–48)
MAGNESIUM SERPL-MCNC: 1.9 MG/DL (ref 1.6–2.6)
MCH RBC QN AUTO: 26.4 PG (ref 27–31)
MCHC RBC AUTO-ENTMCNC: 33 G/DL (ref 32–36)
MCV RBC AUTO: 80 FL (ref 82–98)
MONOCYTES # BLD AUTO: 0.9 K/UL (ref 0.3–1)
MONOCYTES NFR BLD: 5.8 % (ref 4–15)
NEUTROPHILS # BLD AUTO: 13 K/UL (ref 1.8–7.7)
NEUTROPHILS NFR BLD: 88.5 % (ref 38–73)
NRBC BLD-RTO: 0 /100 WBC
PLATELET # BLD AUTO: 267 K/UL (ref 150–350)
PMV BLD AUTO: 9.6 FL (ref 9.2–12.9)
POCT GLUCOSE: 279 MG/DL (ref 70–110)
POCT GLUCOSE: 310 MG/DL (ref 70–110)
POCT GLUCOSE: 326 MG/DL (ref 70–110)
POTASSIUM SERPL-SCNC: 5.1 MMOL/L (ref 3.5–5.1)
PROT SERPL-MCNC: 6.7 G/DL (ref 6–8.4)
RBC # BLD AUTO: 4.13 M/UL (ref 4.6–6.2)
SODIUM SERPL-SCNC: 125 MMOL/L (ref 136–145)
VANCOMYCIN SERPL-MCNC: 13.5 UG/ML
WBC # BLD AUTO: 14.65 K/UL (ref 3.9–12.7)

## 2020-07-20 PROCEDURE — 85025 COMPLETE CBC W/AUTO DIFF WBC: CPT

## 2020-07-20 PROCEDURE — 86140 C-REACTIVE PROTEIN: CPT

## 2020-07-20 PROCEDURE — 82728 ASSAY OF FERRITIN: CPT

## 2020-07-20 PROCEDURE — 25000003 PHARM REV CODE 250: Performed by: HOSPITALIST

## 2020-07-20 PROCEDURE — 63600175 PHARM REV CODE 636 W HCPCS: Performed by: HOSPITALIST

## 2020-07-20 PROCEDURE — 82533 TOTAL CORTISOL: CPT

## 2020-07-20 PROCEDURE — 99900035 HC TECH TIME PER 15 MIN (STAT)

## 2020-07-20 PROCEDURE — 11000001 HC ACUTE MED/SURG PRIVATE ROOM

## 2020-07-20 PROCEDURE — 94640 AIRWAY INHALATION TREATMENT: CPT

## 2020-07-20 PROCEDURE — 99254 PR INITIAL INPATIENT CONSULT,LEVL IV: ICD-10-PCS | Mod: ,,, | Performed by: INTERNAL MEDICINE

## 2020-07-20 PROCEDURE — 25000242 PHARM REV CODE 250 ALT 637 W/ HCPCS: Performed by: HOSPITALIST

## 2020-07-20 PROCEDURE — C1751 CATH, INF, PER/CENT/MIDLINE: HCPCS

## 2020-07-20 PROCEDURE — 83615 LACTATE (LD) (LDH) ENZYME: CPT

## 2020-07-20 PROCEDURE — 83735 ASSAY OF MAGNESIUM: CPT

## 2020-07-20 PROCEDURE — 99254 IP/OBS CNSLTJ NEW/EST MOD 60: CPT | Mod: ,,, | Performed by: INTERNAL MEDICINE

## 2020-07-20 PROCEDURE — 80053 COMPREHEN METABOLIC PANEL: CPT

## 2020-07-20 PROCEDURE — 99232 PR SUBSEQUENT HOSPITAL CARE,LEVL II: ICD-10-PCS | Mod: ,,, | Performed by: PODIATRIST

## 2020-07-20 PROCEDURE — C9399 UNCLASSIFIED DRUGS OR BIOLOG: HCPCS | Performed by: HOSPITALIST

## 2020-07-20 PROCEDURE — 36569 INSJ PICC 5 YR+ W/O IMAGING: CPT

## 2020-07-20 PROCEDURE — 80202 ASSAY OF VANCOMYCIN: CPT

## 2020-07-20 PROCEDURE — 99232 SBSQ HOSP IP/OBS MODERATE 35: CPT | Mod: ,,, | Performed by: PODIATRIST

## 2020-07-20 PROCEDURE — 36415 COLL VENOUS BLD VENIPUNCTURE: CPT

## 2020-07-20 PROCEDURE — 94761 N-INVAS EAR/PLS OXIMETRY MLT: CPT

## 2020-07-20 RX ORDER — SODIUM CHLORIDE 0.9 % (FLUSH) 0.9 %
10 SYRINGE (ML) INJECTION EVERY 6 HOURS
Status: DISCONTINUED | OUTPATIENT
Start: 2020-07-21 | End: 2020-07-24 | Stop reason: HOSPADM

## 2020-07-20 RX ORDER — SODIUM CHLORIDE 0.9 % (FLUSH) 0.9 %
10 SYRINGE (ML) INJECTION
Status: DISCONTINUED | OUTPATIENT
Start: 2020-07-20 | End: 2020-07-24 | Stop reason: HOSPADM

## 2020-07-20 RX ORDER — ALBUTEROL SULFATE 90 UG/1
2 AEROSOL, METERED RESPIRATORY (INHALATION) 4 TIMES DAILY
Status: DISCONTINUED | OUTPATIENT
Start: 2020-07-20 | End: 2020-07-21

## 2020-07-20 RX ADMIN — HEPARIN SODIUM 5000 UNITS: 5000 INJECTION INTRAVENOUS; SUBCUTANEOUS at 05:07

## 2020-07-20 RX ADMIN — THERA TABS 1 TABLET: TAB at 09:07

## 2020-07-20 RX ADMIN — HEPARIN SODIUM 5000 UNITS: 5000 INJECTION INTRAVENOUS; SUBCUTANEOUS at 03:07

## 2020-07-20 RX ADMIN — ERTAPENEM 1 G: 1 INJECTION INTRAMUSCULAR; INTRAVENOUS at 09:07

## 2020-07-20 RX ADMIN — OXYCODONE HYDROCHLORIDE AND ACETAMINOPHEN 500 MG: 500 TABLET ORAL at 09:07

## 2020-07-20 RX ADMIN — GABAPENTIN 300 MG: 300 CAPSULE ORAL at 09:07

## 2020-07-20 RX ADMIN — LISINOPRIL 10 MG: 5 TABLET ORAL at 09:07

## 2020-07-20 RX ADMIN — INSULIN ASPART 8 UNITS: 100 INJECTION, SOLUTION INTRAVENOUS; SUBCUTANEOUS at 05:07

## 2020-07-20 RX ADMIN — INSULIN ASPART 6 UNITS: 100 INJECTION, SOLUTION INTRAVENOUS; SUBCUTANEOUS at 12:07

## 2020-07-20 RX ADMIN — DEXAMETHASONE 6 MG: 2 TABLET ORAL at 09:07

## 2020-07-20 RX ADMIN — HEPARIN SODIUM 5000 UNITS: 5000 INJECTION INTRAVENOUS; SUBCUTANEOUS at 09:07

## 2020-07-20 RX ADMIN — AMLODIPINE BESYLATE 10 MG: 5 TABLET ORAL at 09:07

## 2020-07-20 RX ADMIN — Medication 1250 MG: at 12:07

## 2020-07-20 RX ADMIN — INSULIN ASPART 8 UNITS: 100 INJECTION, SOLUTION INTRAVENOUS; SUBCUTANEOUS at 09:07

## 2020-07-20 RX ADMIN — INSULIN DETEMIR 10 UNITS: 100 INJECTION, SOLUTION SUBCUTANEOUS at 09:07

## 2020-07-20 RX ADMIN — ATORVASTATIN CALCIUM 40 MG: 40 TABLET, FILM COATED ORAL at 09:07

## 2020-07-20 RX ADMIN — ALBUTEROL SULFATE 2 PUFF: 90 AEROSOL, METERED RESPIRATORY (INHALATION) at 12:07

## 2020-07-20 RX ADMIN — FAMOTIDINE 20 MG: 20 TABLET ORAL at 09:07

## 2020-07-20 RX ADMIN — ALBUTEROL SULFATE 2 PUFF: 90 AEROSOL, METERED RESPIRATORY (INHALATION) at 07:07

## 2020-07-20 NOTE — CONSULTS
[facility]  Infectious Disease  Consult Note    Patient Name: Catalino Mcfadden  MRN: [id number]  Admission Date: 7/16/2020  Hospital Length of Stay: 4 days  Attending Physician: Vikas Reyes MD  Primary Care Provider: Azikiwe K Lombard, MD     Isolation Status: Airborne and Contact and Droplet    Patient information was obtained from patient, past medical records and ER records.      Inpatient consult to Infectious Diseases  Consult performed by: Pat Resendiz MD  Consult ordered by: Verona Whitmore DPM        Assessment/Plan:     * Acute osteomyelitis of metatarsal bone of left foot  Osteomyelitis of 5th metatarsal presumed to be acute while awaiting culture results. Wound cultures with Myroides and Peptostreptococcus. Preliminary bone cultures with Peptostreptococcus.   · OK to continue ertapenem for now.   · If Myroides on bone culture can consider de-escalating to Zosyn.   · Will need at least 6 weeks of IV antibiotics.   · Please discuss PICC placement with his nephrologist.   · Will need weekly CBC, CMP, ESR and CRP.   · Discontinue vancomycin.     Acute renal failure superimposed on stage 3 chronic kidney disease  Baseline creatinine 2.8 to 3.1 if no progression of CKD.     COVID-19 virus infection  Currently not requiring oxygen.   · Defer management to primary service.       Thank you for your consult. I will follow-up with patient. Please contact us if you have any additional questions.    Pat Resendiz MD  Infectious Disease  [facility]    Subjective:     Principal Problem: Acute osteomyelitis of metatarsal bone of left foot    HPI: A 55-year-old man with HTN, DM, CKD 3, and diabetic foot ulcer who had worsening left foot ulcer. He had been previously treated with Cipro as he left [facility] to help care for his father who had SARS-CoV 2 infection. After returning he underwent diagnostic imaging which showed changes consistent with osteomyelitis. He was admitted  to [facility] and tested for SARS-CoV 2 which was subsequently positive. He was evaluated by podiatry and underwent debridement with bone biopsy. Initial wound cultures showed Myroides spp and Peptostreptococcus anaerobius. Bone cultures from 7/17 with Peptostreptococcus and aerobic cultures still in process.     Mr. Mcfadden has NKDA. He does not have pets. Denies toxic habits.     Infectious Diseases consulted for antibiotic management recommendations.           Past Medical History:   Diagnosis Date    CKD (chronic kidney disease), stage III 07/16/2020    CKD stage 3 due to type 1 diabetes mellitus     CKD stage 3 due to type 2 diabetes mellitus     Diabetes mellitus, type 2     Diabetic foot ulcer associated with diabetes mellitus due to underlying condition 07/16/2020    Diabetic foot ulcers     Hyperlipidemia     Hypertension        Past Surgical History:   Procedure Laterality Date    BONE BIOPSY Left 7/17/2020    Procedure: BIOPSY, BONE;  Surgeon: Verona Whitmore DPM;  Location: [facility] OR;  Service: Podiatry;  Laterality: Left;    CERVICAL DISC SURGERY  07/11/2016    DEBRIDEMENT Left 7/17/2020    Procedure: DEBRIDEMENT;  Surgeon: Verona Whitmore DPM;  Location: [facility] OR;  Service: Podiatry;  Laterality: Left;    left leg orthopedic surgery Left        Review of patient's allergies indicates:   Allergen Reactions    Morphine Hallucinations       Medications:  Medications Prior to Admission   Medication Sig    amLODIPine (NORVASC) 10 MG tablet Take 1 tablet (10 mg total) by mouth once daily.    atorvastatin (LIPITOR) 40 MG tablet Take 1 tablet (40 mg total) by mouth once daily.    blood sugar diagnostic Strp 1 strip by Misc.(Non-Drug; Combo Route) route 3 (three) times daily. Patient needs One Touch Test Strips (Berio).    chlorthalidone (HYGROTEN) 25 MG Tab TAKE 1 TABLET(25 MG) BY MOUTH EVERY DAY    diltiaZEM (CARDIZEM CD) 240 MG 24 hr capsule TAKE 1 CAPSULE BY MOUTH EVERY DAY    diltiaZEM  (CARDIZEM CD) 360 MG 24 hr capsule Take 1 capsule (360 mg total) by mouth once daily.    lisinopriL (PRINIVIL,ZESTRIL) 20 MG tablet TAKE 1 TABLET(20 MG) BY MOUTH EVERY DAY    TRULICITY 0.75 mg/0.5 mL PnIj INJECT 0.5 ML UNDER THE SKIN EVERY 7 DAYS     Antibiotics (From admission, onward)    Start     Stop Route Frequency Ordered    07/18/20 1000  ertapenem (INVANZ) 1 g in sodium chloride 0.9 % 100 mL IVPB (ready to mix system)      -- IV Every 24 hours (non-standard times) 07/18/20 0857    07/17/20 1332  vancomycin - pharmacy to dose  (vancomycin IVPB)      -- IV pharmacy to manage frequency 07/17/20 1232        Antifungals (From admission, onward)    None        Antivirals (From admission, onward)    None           Immunization History   Administered Date(s) Administered    Influenza - Quadrivalent - PF (6 months and older) 03/10/2020    Pneumococcal Polysaccharide - 23 Valent 06/16/2017       Family History     Problem Relation (Age of Onset)    Heart disease Father        Social History     Socioeconomic History    Marital status:      Spouse name: Not on file    Number of children: Not on file    Years of education: Not on file    Highest education level: Not on file   Occupational History    Not on file   Social Needs    Financial resource strain: Not on file    Food insecurity     Worry: Not on file     Inability: Not on file    Transportation needs     Medical: Not on file     Non-medical: Not on file   Tobacco Use    Smoking status: Never Smoker    Smokeless tobacco: Never Used   Substance and Sexual Activity    Alcohol use: Not Currently     Alcohol/week: 0.0 standard drinks     Comment: social    Drug use: No    Sexual activity: Not on file   Lifestyle    Physical activity     Days per week: Not on file     Minutes per session: Not on file    Stress: Not on file   Relationships    Social connections     Talks on phone: Not on file     Gets together: Not on file     Attends  Hoahaoism service: Not on file     Active member of club or organization: Not on file     Attends meetings of clubs or organizations: Not on file     Relationship status: Not on file   Other Topics Concern    Not on file   Social History Narrative    Not on file     Review of Systems   Constitutional: Negative for chills, fatigue and fever.   HENT: Negative for ear pain, mouth sores, nosebleeds, postnasal drip, rhinorrhea, sinus pressure, sore throat, tinnitus, trouble swallowing and voice change.    Eyes: Negative for photophobia, pain, redness and visual disturbance.   Respiratory: Negative for apnea, cough, chest tightness, shortness of breath and wheezing.    Cardiovascular: Negative for chest pain, palpitations and leg swelling.   Gastrointestinal: Negative for abdominal pain, blood in stool, constipation, diarrhea, nausea and vomiting.   Endocrine: Negative for cold intolerance, heat intolerance, polydipsia and polyuria.   Genitourinary: Negative for decreased urine volume, difficulty urinating, discharge, dysuria, flank pain, frequency, genital sores, hematuria, penile pain, penile swelling, scrotal swelling, testicular pain and urgency.   Musculoskeletal: Negative for arthralgias, back pain, joint swelling, myalgias and neck pain.   Skin: Positive for wound. Negative for color change and rash.   Allergic/Immunologic: Negative for environmental allergies and food allergies.   Neurological: Negative for dizziness, seizures, syncope, weakness, light-headedness, numbness and headaches.   Hematological: Negative for adenopathy. Does not bruise/bleed easily.   Psychiatric/Behavioral: Negative for agitation, confusion, decreased concentration, hallucinations, self-injury, sleep disturbance and suicidal ideas. The patient is not nervous/anxious.      Objective:     Vital Signs (Most Recent):  Temp: 98.5 °F (36.9 °C) (07/20/20 1617)  Pulse: 88 (07/20/20 1617)  Resp: (!) 21 (07/20/20 1617)  BP: 137/82 (07/20/20  1617)  SpO2: (!) 94 % (07/20/20 1617) Vital Signs (24h Range):  Temp:  [97.7 °F (36.5 °C)-99 °F (37.2 °C)] 98.5 °F (36.9 °C)  Pulse:  [77-89] 88  Resp:  [17-21] 21  SpO2:  [94 %-98 %] 94 %  BP: (137-167)/(76-84) 137/82     Weight: 112 kg (246 lb 14.6 oz)  Body mass index is 32.58 kg/m².    Estimated Creatinine Clearance: 32.8 mL/min (A) (based on SCr of 3.3 mg/dL (H)).    Physical Exam  Vitals signs reviewed.   Constitutional:       Appearance: He is well-developed.   HENT:      Head: Normocephalic and atraumatic.      Right Ear: External ear normal.      Left Ear: External ear normal.   Eyes:      General: No scleral icterus.        Right eye: No discharge.         Left eye: No discharge.      Conjunctiva/sclera: Conjunctivae normal.   Neck:      Musculoskeletal: Normal range of motion and neck supple.      Thyroid: No thyromegaly.   Cardiovascular:      Rate and Rhythm: Normal rate and regular rhythm.      Heart sounds: Normal heart sounds. No murmur.   Pulmonary:      Effort: Pulmonary effort is normal.      Breath sounds: Normal breath sounds. No wheezing or rales.   Abdominal:      General: Bowel sounds are normal.      Palpations: Abdomen is soft. There is no mass.      Tenderness: There is no abdominal tenderness. There is no rebound.   Musculoskeletal: Normal range of motion.      Comments: Left foot wrapped with gauze dressing.    Lymphadenopathy:      Cervical: No cervical adenopathy.   Skin:     General: Skin is warm and dry.      Comments: Tattoo's.    Neurological:      Mental Status: He is alert and oriented to person, place, and time.   Psychiatric:         Behavior: Behavior normal.         Significant Labs:   Blood Culture:   Recent Labs   Lab 07/16/20  1717 07/16/20  1722   LABBLOO No Growth to date  No Growth to date  No Growth to date  No Growth to date No Growth to date  No Growth to date  No Growth to date  No Growth to date     BMP:   Recent Labs   Lab 07/20/20  0553   *   *    K 5.1   CL 97   CO2 21*   BUN 82*   CREATININE 3.3*   CALCIUM 8.2*   MG 1.9     CBC:   Recent Labs   Lab 07/20/20  0553   WBC 14.65*   HGB 10.9*   HCT 33.0*        Urine Studies:   Recent Labs   Lab 07/16/20  1740   COLORU Yellow   APPEARANCEUA Clear   PHUR 5.0   SPECGRAV 1.015   PROTEINUA 3+*   GLUCUA Negative   KETONESU Negative   BILIRUBINUA Negative   OCCULTUA 1+*   NITRITE Negative   UROBILINOGEN Negative   LEUKOCYTESUR Negative   RBCUA 3   WBCUA 1   BACTERIA Occasional   SQUAMEPITHEL 2   HYALINECASTS 0       Significant Imaging: I have reviewed all pertinent imaging results/findings within the past 24 hours.

## 2020-07-20 NOTE — PROGRESS NOTES
[facility]  Podiatry  Consult Note    Patient Name: Catalino Mcfadden  MRN: [id number]  Admission Date: 7/16/2020  Hospital Length of Stay: 4 days  Attending Physician: Vikas Reyes MD  Primary Care Provider: Azikiwe K Lombard, MD     Consults  Subjective:     History of Present Illness: 56 y/o male PMH DM2, CKD admitted for left foot infection. Reports left foot ulceration x 3 weeks, thinks this started due to rubbing of brace on the lateral foot. Pt. Followed by Dr. Martinez per chart review contacted the office on 6/29/20 stating that he was heading out of town to [place] to check on his father. Rx. Cipro then sent to pharmacy which patient reports taking. He returned to [place] yesterday and was seen by . Xray left foot ordered revealing OM, gas forming infection. Sent to ED for admission. Denies N/V/F. Reports some chills.     7/18/20: S/p one day left foot debridement, bone biopsy. Denies pedal pain. CAM boot intact left foot.     7/19/20: S/p 2 days left foot debridement, bone biopsy. CAM boot in place left foot.     7/20/20: Patient seen bedside s/p 3 days left foot debridement. CAM boot intact.     Scheduled Meds:   albuterol  2 puff Inhalation QID    amLODIPine  10 mg Oral Daily    ascorbic acid (vitamin C)  500 mg Oral BID    atorvastatin  40 mg Oral Daily    dexAMETHasone  6 mg Oral Daily    ertapenem (INVANZ) IVPB  1 g Intravenous Q24H    famotidine  20 mg Oral Daily    gabapentin  300 mg Oral BID    heparin (porcine)  5,000 Units Subcutaneous Q8H    insulin detemir U-100  10 Units Subcutaneous Daily    insulin detemir U-100  15 Units Subcutaneous QHS    lisinopriL  10 mg Oral Daily    multivitamin  1 tablet Oral Daily     Continuous Infusions:    PRN Meds:acetaminophen, benzonatate, dextrose 50%, dextrose 50%, glucagon (human recombinant), glucose, glucose, hydrALAZINE, insulin aspart U-100, ondansetron, simethicone, sodium chloride 0.9%, Pharmacy to dose Vancomycin consult  **AND** vancomycin - pharmacy to dose    Review of patient's allergies indicates:   Allergen Reactions    Morphine Hallucinations        Past Medical History:   Diagnosis Date    CKD (chronic kidney disease), stage III 07/16/2020    CKD stage 3 due to type 1 diabetes mellitus     CKD stage 3 due to type 2 diabetes mellitus     Diabetes mellitus, type 2     Diabetic foot ulcer associated with diabetes mellitus due to underlying condition 07/16/2020    Diabetic foot ulcers     Hyperlipidemia     Hypertension      Past Surgical History:   Procedure Laterality Date    BONE BIOPSY Left 7/17/2020    Procedure: BIOPSY, BONE;  Surgeon: Verona Whitmore DPM;  Location: [facility] OR;  Service: Podiatry;  Laterality: Left;    CERVICAL DISC SURGERY  07/11/2016    DEBRIDEMENT Left 7/17/2020    Procedure: DEBRIDEMENT;  Surgeon: Verona Whitmore DPM;  Location: [facility] OR;  Service: Podiatry;  Laterality: Left;    left leg orthopedic surgery Left        Family History     Problem Relation (Age of Onset)    Heart disease Father        Tobacco Use    Smoking status: Never Smoker    Smokeless tobacco: Never Used   Substance and Sexual Activity    Alcohol use: Not Currently     Alcohol/week: 0.0 standard drinks     Comment: social    Drug use: No    Sexual activity: Not on file     Review of Systems   Constitutional: Positive for activity change.   Respiratory: Negative for shortness of breath.    Cardiovascular: Negative for chest pain and leg swelling.   Gastrointestinal: Negative for nausea and vomiting.   Musculoskeletal: Positive for arthralgias.   Neurological: Positive for numbness. Negative for weakness.     Objective:     Vital Signs (Most Recent):  Temp: 98.5 °F (36.9 °C) (07/20/20 1617)  Pulse: 88 (07/20/20 1617)  Resp: (!) 21 (07/20/20 1617)  BP: 137/82 (07/20/20 1617)  SpO2: (!) 94 % (07/20/20 1617) Vital Signs (24h Range):  Temp:  [97.7 °F (36.5 °C)-99 °F (37.2 °C)] 98.5 °F (36.9 °C)  Pulse:  [77-89] 88  Resp:   [17-21] 21  SpO2:  [94 %-98 %] 94 %  BP: (137-167)/(76-84) 137/82     Weight: 112 kg (246 lb 14.6 oz)  Body mass index is 32.58 kg/m².    Foot Exam    General  Orientation: alert and oriented to person, place, and time       Right Foot/Ankle     Inspection and Palpation  Tenderness: none   Swelling: none   Skin Exam: no ulcer     Neurovascular  Dorsalis pedis: 1+  Posterior tibial: 1+      Left Foot/Ankle      Inspection and Palpation  Tenderness: metatarsals   Swelling: none   Skin Exam: ulcer;     Neurovascular  Dorsalis pedis: 1+  Posterior tibial: 1+          7/20/20:  Sutures intact skin edges well coapted.   Wound 1: Left plantar foot ulceration   Measurement: pre hudson: 0lwe5lvs1.8cm post hudson: 9qxc8rox2.8cm  Base: fibrogranular base   Periwound skin: HPK  Drainage: purulent   Probe: probes to bone.     Pre debridement       Post debridement.             7/19/20:    Sutures intact skin edges well coapted.   Wound 1: Left plantar foot ulceration   Measurement: [id number]  Base: fibrogranular base   Periwound skin: HPK  Drainage: none  Probe: probes to bone.   No purulent drainage noted.           7/18/20:  Wound 1: Left plantar foot ulceration   Measurement: [id number]  Base: fibrogranular base   Periwound skin: HPK  Drainage: none  Probe: probes to bone.   No purulent drainage noted.                   7/17/20:  Ulcer location: Left  lateral 5th metatarsal base   Signs of infection: +malodor, local edema and erythema  Drainage: Sero-Sanguinous  Purulence: no  Crepitus/fluctuance: no  Periwound: Reddened, Macerated, Calloused  Base: Fibrotic slough  Depth: muscle  Probe to bone: yes                Laboratory:  CBC:   Recent Labs   Lab 07/20/20  0553   WBC 14.65*   RBC 4.13*   HGB 10.9*   HCT 33.0*      MCV 80*   MCH 26.4*   MCHC 33.0     CMP:   Recent Labs   Lab 07/20/20  0553   *   CALCIUM 8.2*   ALBUMIN 2.2*   PROT 6.7   *   K 5.1   CO2 21*   CL 97   BUN 82*   CREATININE 3.3*   ALKPHOS  314*   ALT 34   AST 33   BILITOT 0.2       Diagnostic Results:  Xray: X-Ray Foot Complete Left  Order: [id number]  Status:  Final result   Visible to patient:  No (not released) Next appt:  07/23/2020 at 10:15 AM in Podiatry (Aviva Martinez DPM) Dx:  Type II diabetes mellitus with neurol...  Details    Reading Physician Reading Date Result Priority   Segundo Brewster Jr., MD  [phone] 7/16/2020 Routine      Narrative & Impression     EXAMINATION:  XR FOOT COMPLETE 3 VIEW LEFT     CLINICAL HISTORY:  wound to lateral base of 5th met; Type 2 diabetes mellitus with other diabetic neurological complication     TECHNIQUE:  XR FOOT COMPLETE 3 VIEW LEFT     COMPARISON:  None     FINDINGS:  There is soft tissue swelling of the midfoot and forefoot with soft tissue ulceration and soft tissue gas adjacent to the base of the 5th metatarsal bone.  There is abnormal sclerosis of the base of the 5th metatarsal shaft which is suspicious for chronic osteomyelitis in light of the adjacent soft tissue ulcer.  Throughout the midfoot there are vague areas of cystic change, there is joint disorganization and loss of normal anatomy suggestive of Charcot arthropathy.  Superimposed septic arthritis and osteomyelitis difficult to exclude with certainty.  There is severe hallux valgus deformity with areas of heterotopic ossification involving the plantar soft tissues of the foot.  Dorsal and plantar calcaneal spurs are present and there are remote fracture deformities of the distal fibula and posterior malleolus.     Impression:     Lateral midfoot soft tissue ulcer and suspected osteomyelitis of the 5th metatarsal bone.     Abnormalities throughout the midfoot which may reflect Charcot arthropathy and or septic arthritis/osteomyelitis     Posttraumatic changes of the distal tibia and fibula and arthritic changes of the 1st MTP joint.                 US Lower Extrem Arteries Bilat with BLANK  Order: [id number]  Status:  Final result    Visible to patient:  No (not released) Next appt:  07/23/2020 at 10:15 AM in Podiatry (Aviva Martinez DPM)  Details    Reading Physician Reading Date Result Priority   Porsha Mcgowan MD  [phone] 7/16/2020 STAT      Narrative & Impression     EXAMINATION:  US ARTERIAL LOWER EXTREMITY BILAT WITH BLANK (XPD)     CLINICAL HISTORY:  Eval for PVD;     TECHNIQUE:  Bilateral ankle-brachial indices as well as bilateral spectral, color and grayscale images of the large arteries of both lower extremities were performed.     COMPARISON:  None     FINDINGS:  ABIs are 0.9 on the right and 0.9 on the left.     Right lower extremity:     Common femoral artery: 96 cm/sec, triphasic     SFA proximal: 76 cm/sec, triphasic     SFA mid: 74 cm/sec, triphasic     SFA distal: 235 cm/sec, triphasic     Popliteal artery: 95 cm/sec, triphasic     Posterior tibial artery: 70 cm/sec     Anterior tibial artery: 46 cm/sec     Peroneal artery: 28 cm/sec     Left lower extremity:     Common femoral artery: 134 cm/sec, triphasic     SFA proximal: 111 cm/sec, triphasic     SFA mid: 98 cm/sec, triphasic     SFA distal: 116 cm/sec, triphasic     Popliteal artery: 93 cm/sec, biphasic     Posterior tibial artery: 106 cm/sec     Anterior tibial artery: 44 cm/sec     Peroneal artery: 78 cm/sec     Impression:     1. ABIs measure 0.9 bilaterally.  2. Greater than doubling of velocities seen between the mid to distal right SFA which may indicate hemodynamically significant stenosis, although normal triphasic waveforms are maintained.  3. Otherwise no additional high-grade stenosis or occlusion seen involving the bilateral lower extremity arteries.                  Clinical Findings:  Left lateral foot ulceration with probe to bone.     Assessment/Plan:     Active Diagnoses:    Diagnosis Date Noted POA    PRINCIPAL PROBLEM:  Acute osteomyelitis of metatarsal bone of left foot [M86.172] 07/16/2020 Yes    Diabetic ulcer of toe of left foot [E11.621,  L97.529] 07/16/2020 Yes    COVID-19 virus infection [U07.1] 07/16/2020 Yes    Hyponatremia [E87.1] 07/16/2020 Yes    Acute renal failure superimposed on stage 3 chronic kidney disease [N17.9, N18.3] 07/16/2020 Yes    Type 2 diabetes mellitus with foot ulcer, without long-term current use of insulin [E11.621, L97.509] 11/08/2019 Yes    Hyperlipidemia, acquired [E78.5] 12/24/2014 Yes    Essential hypertension [I10] 08/26/2014 Yes      Problems Resolved During this Admission:     S/p 3  days left foot debridement, bone biopsy.     Debridement: With verbal consent, nonviable tissues on the left foot were debrided beyond sub q utilizing a  sterile No. 3 scalpel and forceps. Mild purulent drainage noted.     NPO at midnight may require further washout bedside vs. OR pending evaluation tomorrow.     Nursing orders placed for vashe wet to dry BID.     ID consult placed to further tailor abx.     Vascular surgery consulted: per vascular no intervention at this time.    Will consider wound VAC application pending clinical course.      Podiatry will follow.        Thank you for your consult. I will follow-up with patient. Please contact us if you have any additional questions.    Verona Whitmore DPM  Podiatry  [facility]

## 2020-07-20 NOTE — ASSESSMENT & PLAN NOTE
-Na was 128 on admit and he had no symptoms.  -On admit he appeared euvolemic and Usom 370, Ada 32 and Sosm 289.  -Held chlorthalidone on admit, but Na a small bit worse 7/18 so gave 1L NS.  Na still a bit worse and now has mild edema in legs.  -Will trial fluid restriction today.  -TSH a bit low but FT4 normal.  Await AM cortisol.    -Repeat labs q48.

## 2020-07-20 NOTE — ASSESSMENT & PLAN NOTE
-A1c 8.1  -At home takes trulicity weekly.  Hold this while inpatient.  -Continue detemir 15 at night and add 10 units in AM.  -Continue moderate dose ISS.  -Continue diabetic diet

## 2020-07-20 NOTE — ANESTHESIA POSTPROCEDURE EVALUATION
Anesthesia Post Evaluation    Patient: Catalino Mcfadden    Procedure(s) Performed: Procedure(s) (LRB):  DEBRIDEMENT (Left)  BIOPSY, BONE (Left)    Final Anesthesia Type: MAC    Patient location during evaluation: PACU  Patient participation: Yes- Able to Participate  Level of consciousness: awake and alert and oriented  Post-procedure vital signs: reviewed and stable  Pain management: adequate  Airway patency: patent    PONV status at discharge: No PONV  Anesthetic complications: no      Cardiovascular status: hemodynamically stable and blood pressure returned to baseline  Respiratory status: spontaneous ventilation, room air and unassisted  Hydration status: euvolemic  Follow-up not needed.          Vitals Value Taken Time   /82 07/20/20 1119   Temp 37.2 °C (99 °F) 07/20/20 1119   Pulse 82 07/20/20 1237   Resp 17 07/20/20 1237   SpO2 95 % 07/20/20 1237         Event Time   Out of Recovery 07/17/2020 13:43:22         Pain/Madiha Score: No data recorded

## 2020-07-20 NOTE — ASSESSMENT & PLAN NOTE
-BP was normal on admit.  Mildly elevated today.  -At home takes norvasc, diltiazem, lisinopril and chlorthalidone  -Odd to take two calcium channel blockers.  -Will continue norvasc and lisinopril.  -Continue prn hydralazine.

## 2020-07-20 NOTE — SUBJECTIVE & OBJECTIVE
Past Medical History:   Diagnosis Date    CKD (chronic kidney disease), stage III 07/16/2020    CKD stage 3 due to type 1 diabetes mellitus     CKD stage 3 due to type 2 diabetes mellitus     Diabetes mellitus, type 2     Diabetic foot ulcer associated with diabetes mellitus due to underlying condition 07/16/2020    Diabetic foot ulcers     Hyperlipidemia     Hypertension        Past Surgical History:   Procedure Laterality Date    BONE BIOPSY Left 7/17/2020    Procedure: BIOPSY, BONE;  Surgeon: Verona Whitmore DPM;  Location: [facility] OR;  Service: Podiatry;  Laterality: Left;    CERVICAL DISC SURGERY  07/11/2016    DEBRIDEMENT Left 7/17/2020    Procedure: DEBRIDEMENT;  Surgeon: Verona Whitmore DPM;  Location: [facility] OR;  Service: Podiatry;  Laterality: Left;    left leg orthopedic surgery Left        Review of patient's allergies indicates:   Allergen Reactions    Morphine Hallucinations       Medications:  Medications Prior to Admission   Medication Sig    amLODIPine (NORVASC) 10 MG tablet Take 1 tablet (10 mg total) by mouth once daily.    atorvastatin (LIPITOR) 40 MG tablet Take 1 tablet (40 mg total) by mouth once daily.    blood sugar diagnostic Strp 1 strip by Misc.(Non-Drug; Combo Route) route 3 (three) times daily. Patient needs One Touch Test Strips (Berio).    chlorthalidone (HYGROTEN) 25 MG Tab TAKE 1 TABLET(25 MG) BY MOUTH EVERY DAY    diltiaZEM (CARDIZEM CD) 240 MG 24 hr capsule TAKE 1 CAPSULE BY MOUTH EVERY DAY    diltiaZEM (CARDIZEM CD) 360 MG 24 hr capsule Take 1 capsule (360 mg total) by mouth once daily.    lisinopriL (PRINIVIL,ZESTRIL) 20 MG tablet TAKE 1 TABLET(20 MG) BY MOUTH EVERY DAY    TRULICITY 0.75 mg/0.5 mL PnIj INJECT 0.5 ML UNDER THE SKIN EVERY 7 DAYS     Antibiotics (From admission, onward)    Start     Stop Route Frequency Ordered    07/18/20 1000  ertapenem (INVANZ) 1 g in sodium chloride 0.9 % 100 mL IVPB (ready to mix system)      -- IV Every 24 hours  (non-standard times) 07/18/20 0857    07/17/20 1332  vancomycin - pharmacy to dose  (vancomycin IVPB)      -- IV pharmacy to manage frequency 07/17/20 1232        Antifungals (From admission, onward)    None        Antivirals (From admission, onward)    None           Immunization History   Administered Date(s) Administered    Influenza - Quadrivalent - PF (6 months and older) 03/10/2020    Pneumococcal Polysaccharide - 23 Valent 06/16/2017       Family History     Problem Relation (Age of Onset)    Heart disease Father        Social History     Socioeconomic History    Marital status:      Spouse name: Not on file    Number of children: Not on file    Years of education: Not on file    Highest education level: Not on file   Occupational History    Not on file   Social Needs    Financial resource strain: Not on file    Food insecurity     Worry: Not on file     Inability: Not on file    Transportation needs     Medical: Not on file     Non-medical: Not on file   Tobacco Use    Smoking status: Never Smoker    Smokeless tobacco: Never Used   Substance and Sexual Activity    Alcohol use: Not Currently     Alcohol/week: 0.0 standard drinks     Comment: social    Drug use: No    Sexual activity: Not on file   Lifestyle    Physical activity     Days per week: Not on file     Minutes per session: Not on file    Stress: Not on file   Relationships    Social connections     Talks on phone: Not on file     Gets together: Not on file     Attends Protestant service: Not on file     Active member of club or organization: Not on file     Attends meetings of clubs or organizations: Not on file     Relationship status: Not on file   Other Topics Concern    Not on file   Social History Narrative    Not on file     Review of Systems   Constitutional: Negative for chills, fatigue and fever.   HENT: Negative for ear pain, mouth sores, nosebleeds, postnasal drip, rhinorrhea, sinus pressure, sore throat,  tinnitus, trouble swallowing and voice change.    Eyes: Negative for photophobia, pain, redness and visual disturbance.   Respiratory: Negative for apnea, cough, chest tightness, shortness of breath and wheezing.    Cardiovascular: Negative for chest pain, palpitations and leg swelling.   Gastrointestinal: Negative for abdominal pain, blood in stool, constipation, diarrhea, nausea and vomiting.   Endocrine: Negative for cold intolerance, heat intolerance, polydipsia and polyuria.   Genitourinary: Negative for decreased urine volume, difficulty urinating, discharge, dysuria, flank pain, frequency, genital sores, hematuria, penile pain, penile swelling, scrotal swelling, testicular pain and urgency.   Musculoskeletal: Negative for arthralgias, back pain, joint swelling, myalgias and neck pain.   Skin: Positive for wound. Negative for color change and rash.   Allergic/Immunologic: Negative for environmental allergies and food allergies.   Neurological: Negative for dizziness, seizures, syncope, weakness, light-headedness, numbness and headaches.   Hematological: Negative for adenopathy. Does not bruise/bleed easily.   Psychiatric/Behavioral: Negative for agitation, confusion, decreased concentration, hallucinations, self-injury, sleep disturbance and suicidal ideas. The patient is not nervous/anxious.      Objective:     Vital Signs (Most Recent):  Temp: 98.5 °F (36.9 °C) (07/20/20 1617)  Pulse: 88 (07/20/20 1617)  Resp: (!) 21 (07/20/20 1617)  BP: 137/82 (07/20/20 1617)  SpO2: (!) 94 % (07/20/20 1617) Vital Signs (24h Range):  Temp:  [97.7 °F (36.5 °C)-99 °F (37.2 °C)] 98.5 °F (36.9 °C)  Pulse:  [77-89] 88  Resp:  [17-21] 21  SpO2:  [94 %-98 %] 94 %  BP: (137-167)/(76-84) 137/82     Weight: 112 kg (246 lb 14.6 oz)  Body mass index is 32.58 kg/m².    Estimated Creatinine Clearance: 32.8 mL/min (A) (based on SCr of 3.3 mg/dL (H)).    Physical Exam  Vitals signs reviewed.   Constitutional:       Appearance: He is  well-developed.   HENT:      Head: Normocephalic and atraumatic.      Right Ear: External ear normal.      Left Ear: External ear normal.   Eyes:      General: No scleral icterus.        Right eye: No discharge.         Left eye: No discharge.      Conjunctiva/sclera: Conjunctivae normal.   Neck:      Musculoskeletal: Normal range of motion and neck supple.      Thyroid: No thyromegaly.   Cardiovascular:      Rate and Rhythm: Normal rate and regular rhythm.      Heart sounds: Normal heart sounds. No murmur.   Pulmonary:      Effort: Pulmonary effort is normal.      Breath sounds: Normal breath sounds. No wheezing or rales.   Abdominal:      General: Bowel sounds are normal.      Palpations: Abdomen is soft. There is no mass.      Tenderness: There is no abdominal tenderness. There is no rebound.   Musculoskeletal: Normal range of motion.      Comments: Left foot wrapped with gauze dressing.    Lymphadenopathy:      Cervical: No cervical adenopathy.   Skin:     General: Skin is warm and dry.      Comments: Tattoo's.    Neurological:      Mental Status: He is alert and oriented to person, place, and time.   Psychiatric:         Behavior: Behavior normal.         Significant Labs:   Blood Culture:   Recent Labs   Lab 07/16/20  1717 07/16/20  1722   LABBLOO No Growth to date  No Growth to date  No Growth to date  No Growth to date No Growth to date  No Growth to date  No Growth to date  No Growth to date     BMP:   Recent Labs   Lab 07/20/20  0553   *   *   K 5.1   CL 97   CO2 21*   BUN 82*   CREATININE 3.3*   CALCIUM 8.2*   MG 1.9     CBC:   Recent Labs   Lab 07/20/20  0553   WBC 14.65*   HGB 10.9*   HCT 33.0*        Urine Studies:   Recent Labs   Lab 07/16/20  1740   COLORU Yellow   APPEARANCEUA Clear   PHUR 5.0   SPECGRAV 1.015   PROTEINUA 3+*   GLUCUA Negative   KETONESU Negative   BILIRUBINUA Negative   OCCULTUA 1+*   NITRITE Negative   UROBILINOGEN Negative   LEUKOCYTESUR Negative   RBCUA  3   WBCUA 1   BACTERIA Occasional   SQUAMEPITHEL 2   HYALINECASTS 0       Significant Imaging: I have reviewed all pertinent imaging results/findings within the past 24 hours.

## 2020-07-20 NOTE — HOSPITAL COURSE
55 year old man with history of diabetes mellitus, diabetic foot ulcers, hypertension, hyperlipidemia and CKDIII who was found to have osteomyelitis on outside imaging and sent in by his podiatrist, Dr. Martinez.  He has had bone biopsy/culture and debridement by podiatry.  Wound culture 7/16 growing Prevotella and Myroides; wound culture 7/17 growing Peptostreptococcus. Bone culture 7/17 with Peptostreptococcus.  ID consulted. PICC already in place for outpatient antibiotics. CT foot 7/21 shows osteomyelitis and soft tissue ulceration but no abscess. On zosyn. Follow up in Podiatry clinic for wound vac placement and with ID.      Also, he has covid - but is completely asymptomatic.  O2 sats got to 94, so he was started on dexamethasone. He has remained off supplemental O2, so dexamethasone was discontinued.     Has mild hyponatremia on fluid restriction (drinks at least a gallon of water daily at home).  Serum cortisol abnormally low at 1.5 on admit. Endocrine consulted and suspect that abnormal ACTH stim test is due to dexamethasone suppressing adrenal axis. His mild acute/chronic renal failure is at baseline. Follow up with PCP and Endocrine.

## 2020-07-20 NOTE — HPI
A 55-year-old man with HTN, DM, CKD 3, and diabetic foot ulcer who had worsening left foot ulcer. He had been previously treated with Cipro as he left town to help care for his father who had SARS-CoV 2 infection. After returning he underwent diagnostic imaging which showed changes consistent with osteomyelitis. He was admitted to [facility] and tested for SARS-CoV 2 which was subsequently positive. He was evaluated by podiatry and underwent debridement with bone biopsy. Initial wound cultures showed Myroides spp and Peptostreptococcus anaerobius. Bone cultures from 7/17 with Peptostreptococcus and aerobic cultures still in process.     Mr. Mcfadden has NKDA. He does not have pets. Denies toxic habits.     Infectious Diseases consulted for antibiotic management recommendations.

## 2020-07-20 NOTE — ASSESSMENT & PLAN NOTE
-Baseline Cr is 2.8-3.1 and on admit Cr is mildly above baseline at 3.8.  -Could be due to mild diminished appetite vs infection.  -Renally dose medications and avoid nephrotoxic agents  -On admit I held acei and chlorthalidone.  Attempted fluids but Na further decreased.  Note mild edema.  Will trial fluid restriction.  -Cr mildly improved to 3.3  -repeat labs q48h.

## 2020-07-20 NOTE — SUBJECTIVE & OBJECTIVE
Interval History: No acute events overnight.  No sob, cough or fever.  Eating well.   Had bedside debridement today - may need deeper exploration in OR tomorrow as had purulent drainage today.  Denies pain in his foot.  No cp.    Review of Systems   Constitutional: Negative for activity change, appetite change and fever.   HENT: Negative for congestion and dental problem.    Eyes: Negative for discharge and itching.   Respiratory: Negative for apnea, cough, chest tightness and shortness of breath.    Cardiovascular: Negative for chest pain and leg swelling.   Gastrointestinal: Negative for abdominal distention and abdominal pain.   Endocrine: Negative for cold intolerance and heat intolerance.   Genitourinary: Negative for difficulty urinating and dysuria.   Musculoskeletal: Negative for arthralgias and back pain.   Skin: Positive for wound.   Allergic/Immunologic: Negative for environmental allergies and food allergies.   Neurological: Negative for dizziness, facial asymmetry and light-headedness.   Hematological: Negative for adenopathy. Does not bruise/bleed easily.   Psychiatric/Behavioral: Negative for agitation and behavioral problems.     Objective:     Vital Signs (Most Recent):  Temp: 99 °F (37.2 °C) (07/20/20 1119)  Pulse: 82 (07/20/20 1237)  Resp: 17 (07/20/20 1237)  BP: (!) 163/82 (07/20/20 1119)  SpO2: 95 % (07/20/20 1237) Vital Signs (24h Range):  Temp:  [97.7 °F (36.5 °C)-99 °F (37.2 °C)] 99 °F (37.2 °C)  Pulse:  [72-89] 82  Resp:  [17-20] 17  SpO2:  [93 %-98 %] 95 %  BP: (146-167)/(76-84) 163/82     Weight: 112 kg (246 lb 14.6 oz)  Body mass index is 32.58 kg/m².    Intake/Output Summary (Last 24 hours) at 7/20/2020 1241  Last data filed at 7/20/2020 0715  Gross per 24 hour   Intake 720 ml   Output 1125 ml   Net -405 ml      Physical Exam  Vitals signs reviewed.   Constitutional:       General: He is not in acute distress.     Appearance: He is well-developed. He is not ill-appearing, toxic-appearing  or diaphoretic.   HENT:      Head: Normocephalic and atraumatic.      Mouth/Throat:      Mouth: Mucous membranes are moist.   Eyes:      Extraocular Movements: Extraocular movements intact.      Pupils: Pupils are equal, round, and reactive to light.   Neck:      Musculoskeletal: Normal range of motion and neck supple.   Cardiovascular:      Rate and Rhythm: Normal rate and regular rhythm.      Heart sounds: Normal heart sounds.   Pulmonary:      Effort: Pulmonary effort is normal. No respiratory distress.      Breath sounds: Normal breath sounds.   Abdominal:      General: Bowel sounds are normal. There is no distension.      Palpations: Abdomen is soft.      Tenderness: There is no abdominal tenderness.   Musculoskeletal: Normal range of motion.   Skin:     General: Skin is warm and dry.      Comments: Left foot wrapped and in DARCO boot.  Images in computer reviewed   Neurological:      Mental Status: He is alert and oriented to person, place, and time.      Cranial Nerves: No cranial nerve deficit.      Coordination: Coordination normal.   Psychiatric:         Behavior: Behavior normal.         Significant Labs: All pertinent labs within the past 24 hours have been reviewed.    Significant Imaging: I have reviewed and interpreted all pertinent imaging results/findings within the past 24 hours.

## 2020-07-20 NOTE — ASSESSMENT & PLAN NOTE
Osteomyelitis of 5th metatarsal presumed to be acute while awaiting culture results. Wound cultures with Myroides and Peptostreptococcus. Preliminary bone cultures with Peptostreptococcus.   · OK to continue ertapenem for now.   · If Myroides on bone culture can consider de-escalating to Zosyn.   · Will need at least 6 weeks of IV antibiotics.   · Please discuss PICC placement with his nephrologist.   · Will need weekly CBC, CMP, ESR and CRP.   · Discontinue vancomycin.

## 2020-07-20 NOTE — ASSESSMENT & PLAN NOTE
-Mr. Mcfadden was admitted to inpatient status  -He had a diabetic foot wound that has failed outpatient treatment with cipro and radiographic evidence of osteomyelitis  -On admit he was afebrile with normal wbc and CRP elevated at 233.  -Doppler US with BLANK obtained.  Vascular surgery consulted and state he has adequate perfusion to heal his wounds.  No intervention needed at this time.  -Podiatry consulted and took patient for debridement and bone biopsy 7/18.  -Culture is growing GNR.  Await speciation and sensitivities.  Noted prior wound culture with Pseudomonas.  -Continue Vancomycin.  Started ertapenem for GNR 7/18.  -Dr. Whitmore performed bedside debridement and dressing change today.  Patient reports there was purulence and that he may need further surgical exploration.  Podiatry also considering wound-vac.  -Will need six weeks of IV antibiotics and wound care.  Will consult picc nurse today.  ID consult placed for today.  -[facility] Medicine service consulted but has not yet had capacity to add to their service.  May be able to do so tomorrow.

## 2020-07-20 NOTE — CONSULTS
[facility] consulted for  Catalino Mcfadden to be followed through telemedicine modalities.    The patient is currently unable to be accepted due to census constraints.    Please re-consult on 7/22 when capacity should be optimized.     Tereza Lopez

## 2020-07-20 NOTE — NURSING
Informed patient he is on restricted fluids to 1000 cc  In 24 hours. Removed water pitch. Patient states understand about restricted fluids

## 2020-07-20 NOTE — ASSESSMENT & PLAN NOTE
-His father just passed away from covid infection and he has tested positive in the ER  -He remains afebrile without any symptoms at this time.  O2 sats dropped to 94% but improved now.  Not requiring supplemental O2.  -Ferritin [phone]  -156-43.9  -167-175.  Repeat q48h  -Continue covid isolation  -Continue vitamin C and multivitamin  -O2 sats dropped to 94.  Continue dexamethasone day 5/10.

## 2020-07-20 NOTE — PROGRESS NOTES
[facility] Hospital Medicine  Progress Note    Patient Name: Catalino Mcfadden  MRN: [id number]  Patient Class: IP- Inpatient   Admission Date: 7/16/2020  Length of Stay: 4 days  Attending Physician: Vikas Reyes MD  Primary Care Provider: Azikiwe K Lombard, MD        Subjective:     Principal Problem:Osteomyelitis of left foot        HPI:  Mr. Mcfadden is a 55 year old man with history of diabetes mellitus, diabetic foot ulcers, hypertension, hyperlipidemia and CKDIII who was found to have osteomyelitis on outside imaging and sent in by his podiatrist, Dr. Martinez.  He states that he has had ulcers on his left foot for quite some time and they had been stable.  Then 2.5 weeks ago he noted a blister on his left foot which subsequently popped.  After discussing with his podiatrist he was started on cipro.  At the time, he was in [place], [place] because his father was ill and in the hospital.  His father passed away with complications from covid-19 infection.  Because he was out of town attending to his father's illness, he was unable to follow up with Dr. Martinez until today.  Xray of his foot was obtained and showed osteomyelitis of his left 5th toe.  He noted a malodorous drainage and a mild achey pain.  He denied fevers, chills, nausea, vomiting, diarrhea, lethargy, malaise, shortness of breath or cough.  He notes he drinks at least a gallon of water daily.    Overview/Hospital Course:  No notes on file    Interval History: No acute events overnight.  No sob, cough or fever.  Eating well.   Had bedside debridement today - may need deeper exploration in OR tomorrow as had purulent drainage today.  Denies pain in his foot.  No cp.    Review of Systems   Constitutional: Negative for activity change, appetite change and fever.   HENT: Negative for congestion and dental problem.    Eyes: Negative for discharge and itching.   Respiratory: Negative for apnea, cough, chest tightness and shortness of breath.     Cardiovascular: Negative for chest pain and leg swelling.   Gastrointestinal: Negative for abdominal distention and abdominal pain.   Endocrine: Negative for cold intolerance and heat intolerance.   Genitourinary: Negative for difficulty urinating and dysuria.   Musculoskeletal: Negative for arthralgias and back pain.   Skin: Positive for wound.   Allergic/Immunologic: Negative for environmental allergies and food allergies.   Neurological: Negative for dizziness, facial asymmetry and light-headedness.   Hematological: Negative for adenopathy. Does not bruise/bleed easily.   Psychiatric/Behavioral: Negative for agitation and behavioral problems.     Objective:     Vital Signs (Most Recent):  Temp: 99 °F (37.2 °C) (07/20/20 1119)  Pulse: 82 (07/20/20 1237)  Resp: 17 (07/20/20 1237)  BP: (!) 163/82 (07/20/20 1119)  SpO2: 95 % (07/20/20 1237) Vital Signs (24h Range):  Temp:  [97.7 °F (36.5 °C)-99 °F (37.2 °C)] 99 °F (37.2 °C)  Pulse:  [72-89] 82  Resp:  [17-20] 17  SpO2:  [93 %-98 %] 95 %  BP: (146-167)/(76-84) 163/82     Weight: 112 kg (246 lb 14.6 oz)  Body mass index is 32.58 kg/m².    Intake/Output Summary (Last 24 hours) at 7/20/2020 1241  Last data filed at 7/20/2020 0715  Gross per 24 hour   Intake 720 ml   Output 1125 ml   Net -405 ml      Physical Exam  Vitals signs reviewed.   Constitutional:       General: He is not in acute distress.     Appearance: He is well-developed. He is not ill-appearing, toxic-appearing or diaphoretic.   HENT:      Head: Normocephalic and atraumatic.      Mouth/Throat:      Mouth: Mucous membranes are moist.   Eyes:      Extraocular Movements: Extraocular movements intact.      Pupils: Pupils are equal, round, and reactive to light.   Neck:      Musculoskeletal: Normal range of motion and neck supple.   Cardiovascular:      Rate and Rhythm: Normal rate and regular rhythm.      Heart sounds: Normal heart sounds.   Pulmonary:      Effort: Pulmonary effort is normal. No respiratory  distress.      Breath sounds: Normal breath sounds.   Abdominal:      General: Bowel sounds are normal. There is no distension.      Palpations: Abdomen is soft.      Tenderness: There is no abdominal tenderness.   Musculoskeletal: Normal range of motion.   Skin:     General: Skin is warm and dry.      Comments: Left foot wrapped and in DARCO boot.  Images in computer reviewed   Neurological:      Mental Status: He is alert and oriented to person, place, and time.      Cranial Nerves: No cranial nerve deficit.      Coordination: Coordination normal.   Psychiatric:         Behavior: Behavior normal.         Significant Labs: All pertinent labs within the past 24 hours have been reviewed.    Significant Imaging: I have reviewed and interpreted all pertinent imaging results/findings within the past 24 hours.      Assessment/Plan:      * Osteomyelitis of left foot  -Mr. Mcfadden was admitted to inpatient status  -He had a diabetic foot wound that has failed outpatient treatment with cipro and radiographic evidence of osteomyelitis  -On admit he was afebrile with normal wbc and CRP elevated at 233.  -Doppler US with BLANK obtained.  Vascular surgery consulted and state he has adequate perfusion to heal his wounds.  No intervention needed at this time.  -Podiatry consulted and took patient for debridement and bone biopsy 7/18.  -Culture is growing GNR.  Await speciation and sensitivities.  Noted prior wound culture with Pseudomonas.  -Continue Vancomycin.  Started ertapenem for GNR 7/18.  -Dr. Whitmore performed bedside debridement and dressing change today.  Patient reports there was purulence and that he may need further surgical exploration.  Podiatry also considering wound-vac.  -Will need six weeks of IV antibiotics and wound care.  Will consult picc nurse today.  ID consult placed for today.  -[facility] Medicine service consulted but has not yet had capacity to add to their service.  May be able to do so  tomorrow.    Diabetic ulcer of toe of left foot  -Treatment as above    COVID-19 virus infection  -His father just passed away from covid infection and he has tested positive in the ER  -He remains afebrile without any symptoms at this time.  O2 sats dropped to 94% but improved now.  Not requiring supplemental O2.  -Ferritin [phone]  -156-43.9  -167-175.  Repeat q48h  -Continue covid isolation  -Continue vitamin C and multivitamin  -O2 sats dropped to 94.  Continue dexamethasone day 5/10.    Acute renal failure superimposed on stage 3 chronic kidney disease  -Baseline Cr is 2.8-3.1 and on admit Cr is mildly above baseline at 3.8.  -Could be due to mild diminished appetite vs infection.  -Renally dose medications and avoid nephrotoxic agents  -On admit I held acei and chlorthalidone.  Attempted fluids but Na further decreased.  Note mild edema.  Will trial fluid restriction.  -Cr mildly improved to 3.3  -repeat labs q48h.    Hyponatremia  -Na was 128 on admit and he had no symptoms.  -On admit he appeared euvolemic and Usom 370, Ada 32 and Sosm 289.  -Held chlorthalidone on admit, but Na a small bit worse 7/18 so gave 1L NS.  Na still a bit worse and now has mild edema in legs.  -Will trial fluid restriction today.  -TSH a bit low but FT4 normal.  Await AM cortisol.    -Repeat labs q48.    Type 2 diabetes mellitus with foot ulcer, without long-term current use of insulin  -A1c 8.1  -At home takes trulicity weekly.  Hold this while inpatient.  -Continue detemir 15 at night and add 10 units in AM.  -Continue moderate dose ISS.  -Continue diabetic diet     Hyperlipidemia, acquired  -Continue home statin      Essential hypertension  -BP was normal on admit.  Mildly elevated today.  -At home takes norvasc, diltiazem, lisinopril and chlorthalidone  -Odd to take two calcium channel blockers.  -Will continue norvasc and lisinopril.  -Continue prn hydralazine.        VTE Risk Mitigation (From admission,  onward)         Ordered     heparin (porcine) injection 5,000 Units  Every 8 hours      07/16/20 1727     IP VTE HIGH RISK PATIENT  Once      07/16/20 1727     Place sequential compression device  Until discontinued      07/16/20 1727                      Vikas Reyes MD  Department of Hospital Medicine   [facility]

## 2020-07-20 NOTE — PROGRESS NOTES
Pharmacokinetic Assessment Follow Up: IV Vancomycin    Vancomycin serum concentration assessment(s):    The random level was drawn correctly and can be used to guide therapy at this time. The measurement is within the desired definitive target range of 10 to 20 mcg/mL.    Vancomycin Regimen Plan:    Give 1250 mg today.  Re-dose when the random level is less than 20 mcg/mL, next level to be drawn at 0400 on 7/21/2020    Drug levels (last 3 results):  Recent Labs   Lab Result Units 07/18/20  0432 07/19/20  0515 07/20/20  0553   Vancomycin, Random ug/mL 13.3 20.0 13.5       Pharmacy will continue to follow and monitor vancomycin.    Please contact pharmacy at extension [phone] for questions regarding this assessment.    Thank you for the consult,   Mitch Vora Jr       Patient brief summary:  Catalino Mcfadden is a 55 y.o. male initiated on antimicrobial therapy with IV Vancomycin for treatment of bone/joint infection    Drug Allergies:   Review of patient's allergies indicates:   Allergen Reactions    Morphine Hallucinations       Actual Body Weight:   112 kg    Renal Function:   Estimated Creatinine Clearance: 32.8 mL/min (A) (based on SCr of 3.3 mg/dL (H)).,     Dialysis Method (if applicable):  N/A    CBC (last 72 hours):  Recent Labs   Lab Result Units 07/18/20  0433 07/20/20  0553   WBC K/uL 6.41 14.65*   Hemoglobin g/dL 10.5* 10.9*   Hematocrit % 32.5* 33.0*   Platelets K/uL 227 267   Gran% % 82.3* 88.5*   Lymph% % 8.3* 4.2*   Mono% % 8.9 5.8   Eosinophil% % 0.0 0.0   Basophil% % 0.0 0.1   Differential Method  Automated Automated       Metabolic Panel (last 72 hours):  Recent Labs   Lab Result Units 07/18/20  0433 07/19/20  0510 07/20/20  0553   Sodium mmol/L 127*  --  125*   Sodium Urine Random mmol/L  --  27  --    Potassium mmol/L 5.1  --  5.1   Chloride mmol/L 99  --  97   CO2 mmol/L 19*  --  21*   Glucose mg/dL 221*  --  359*   BUN, Bld mg/dL 63*  --  82*   Creatinine mg/dL 3.4*  --  3.3*   Albumin g/dL 2.2*   --  2.2*   Total Bilirubin mg/dL 0.3  --  0.2   Alkaline Phosphatase U/L 277*  --  314*   AST U/L 29  --  33   ALT U/L 24  --  34   Magnesium mg/dL 2.0  --  1.9       Vancomycin Administrations:  vancomycin given in the last 96 hours                     vancomycin 1.5 g in dextrose 5 % 250 mL IVPB (ready to mix) (mg) 1,500 mg New Bag 07/18/20 1153    vancomycin 1.25 g in dextrose 5% 250 mL IVPB (ready to mix) (mg) 1,250 mg New Bag 07/17/20 1944    vancomycin (VANCOCIN) 2,000 mg in dextrose 5 % 500 mL IVPB (mg) 2,000 mg New Bag 07/16/20 1733                    Microbiologic Results:  Microbiology Results (last 7 days)       Procedure Component Value Units Date/Time    AFB Culture & Smear [[id number]] Collected: 07/17/20 1258    Order Status: Completed Specimen: Bone from Foot, Left Updated: 07/19/20 2127     AFB Culture & Smear Culture in progress    Narrative:      Left foot 5th metatarsal bone biopsy    Blood Culture #2 **CANNOT BE ORDERED STAT** [[id number]] Collected: 07/16/20 1722    Order Status: Completed Specimen: Blood from Peripheral, Antecubital, Left Updated: 07/19/20 2103     Blood Culture, Routine No Growth to date      No Growth to date      No Growth to date      No Growth to date    Blood Culture #1 **CANNOT BE ORDERED STAT** [[id number]] Collected: 07/16/20 1717    Order Status: Completed Specimen: Blood from Peripheral, Hand, Right Updated: 07/19/20 2103     Blood Culture, Routine No Growth to date      No Growth to date      No Growth to date      No Growth to date    Culture, Anaerobe [[id number]] Collected: 07/17/20 1258    Order Status: Completed Specimen: Bone from Foot, Left Updated: 07/19/20 0754     Anaerobic Culture Culture in progress    Narrative:      Left foot 5th metatarsal bone biopsy    Culture, Anaerobe [[id number]] Collected: 07/17/20 1243    Order Status: Completed Specimen: Wound from Foot, Left Updated: 07/19/20 0749     Anaerobic Culture Culture in progress    Aerobic culture  [[id number]] Collected: 07/17/20 1258    Order Status: Completed Specimen: Bone from Foot, Left Updated: 07/19/20 0555     Aerobic Bacterial Culture No growth    Narrative:      Left foot 5th metatarsal bone biopsy    Aerobic culture [[id number]] Collected: 07/17/20 1243    Order Status: Completed Specimen: Wound from Foot, Left Updated: 07/19/20 0551     Aerobic Bacterial Culture No growth      No significant isolate to date    Gram stain [[id number]] Collected: 07/17/20 1258    Order Status: Completed Specimen: Bone from Foot, Left Updated: 07/17/20 5220     Gram Stain Result No organisms seen      No WBC's    Narrative:      Left foot 5th metatarsal bone biopsy    Fungus culture [[id number]] Collected: 07/17/20 1258    Order Status: Sent Specimen: Bone from Foot, Left Updated: 07/17/20 1116

## 2020-07-21 PROBLEM — Z01.818 PRE-OP EXAM: Status: RESOLVED | Noted: 2020-07-16 | Resolved: 2020-07-21

## 2020-07-21 LAB
BACTERIA BLD CULT: NORMAL
BACTERIA BLD CULT: NORMAL
BACTERIA SPEC ANAEROBE CULT: ABNORMAL
BACTERIA SPEC ANAEROBE CULT: ABNORMAL
BASOPHILS # BLD AUTO: 0.03 K/UL (ref 0–0.2)
BASOPHILS NFR BLD: 0.2 % (ref 0–1.9)
DIFFERENTIAL METHOD: ABNORMAL
EOSINOPHIL # BLD AUTO: 0 K/UL (ref 0–0.5)
EOSINOPHIL NFR BLD: 0 % (ref 0–8)
ERYTHROCYTE [DISTWIDTH] IN BLOOD BY AUTOMATED COUNT: 12 % (ref 11.5–14.5)
HCT VFR BLD AUTO: 33 % (ref 40–54)
HGB BLD-MCNC: 10.9 G/DL (ref 14–18)
IMM GRANULOCYTES # BLD AUTO: 0.32 K/UL (ref 0–0.04)
IMM GRANULOCYTES NFR BLD AUTO: 1.7 % (ref 0–0.5)
LYMPHOCYTES # BLD AUTO: 0.8 K/UL (ref 1–4.8)
LYMPHOCYTES NFR BLD: 4.2 % (ref 18–48)
MCH RBC QN AUTO: 26.7 PG (ref 27–31)
MCHC RBC AUTO-ENTMCNC: 33 G/DL (ref 32–36)
MCV RBC AUTO: 81 FL (ref 82–98)
MONOCYTES # BLD AUTO: 1.1 K/UL (ref 0.3–1)
MONOCYTES NFR BLD: 5.8 % (ref 4–15)
NEUTROPHILS # BLD AUTO: 16.4 K/UL (ref 1.8–7.7)
NEUTROPHILS NFR BLD: 88.1 % (ref 38–73)
NRBC BLD-RTO: 0 /100 WBC
PLATELET # BLD AUTO: 290 K/UL (ref 150–350)
PMV BLD AUTO: 9.4 FL (ref 9.2–12.9)
POCT GLUCOSE: 228 MG/DL (ref 70–110)
POCT GLUCOSE: 288 MG/DL (ref 70–110)
POCT GLUCOSE: 315 MG/DL (ref 70–110)
POCT GLUCOSE: 394 MG/DL (ref 70–110)
RBC # BLD AUTO: 4.08 M/UL (ref 4.6–6.2)
VANCOMYCIN SERPL-MCNC: 19.5 UG/ML
WBC # BLD AUTO: 18.59 K/UL (ref 3.9–12.7)

## 2020-07-21 PROCEDURE — 99233 PR SUBSEQUENT HOSPITAL CARE,LEVL III: ICD-10-PCS | Mod: ,,, | Performed by: INTERNAL MEDICINE

## 2020-07-21 PROCEDURE — 94761 N-INVAS EAR/PLS OXIMETRY MLT: CPT

## 2020-07-21 PROCEDURE — 99232 SBSQ HOSP IP/OBS MODERATE 35: CPT | Mod: ,,, | Performed by: PODIATRIST

## 2020-07-21 PROCEDURE — 99900035 HC TECH TIME PER 15 MIN (STAT)

## 2020-07-21 PROCEDURE — 36415 COLL VENOUS BLD VENIPUNCTURE: CPT

## 2020-07-21 PROCEDURE — 25000242 PHARM REV CODE 250 ALT 637 W/ HCPCS: Performed by: HOSPITALIST

## 2020-07-21 PROCEDURE — 80202 ASSAY OF VANCOMYCIN: CPT

## 2020-07-21 PROCEDURE — 11000001 HC ACUTE MED/SURG PRIVATE ROOM

## 2020-07-21 PROCEDURE — 25000003 PHARM REV CODE 250: Performed by: HOSPITALIST

## 2020-07-21 PROCEDURE — 99233 SBSQ HOSP IP/OBS HIGH 50: CPT | Mod: ,,, | Performed by: INTERNAL MEDICINE

## 2020-07-21 PROCEDURE — 63600175 PHARM REV CODE 636 W HCPCS: Performed by: HOSPITALIST

## 2020-07-21 PROCEDURE — 85025 COMPLETE CBC W/AUTO DIFF WBC: CPT

## 2020-07-21 PROCEDURE — 99232 PR SUBSEQUENT HOSPITAL CARE,LEVL II: ICD-10-PCS | Mod: ,,, | Performed by: PODIATRIST

## 2020-07-21 PROCEDURE — A4216 STERILE WATER/SALINE, 10 ML: HCPCS | Performed by: HOSPITALIST

## 2020-07-21 RX ORDER — COSYNTROPIN 0.25 MG/ML
0.25 INJECTION, POWDER, FOR SOLUTION INTRAMUSCULAR; INTRAVENOUS ONCE
Status: COMPLETED | OUTPATIENT
Start: 2020-07-22 | End: 2020-07-22

## 2020-07-21 RX ORDER — ALBUTEROL SULFATE 90 UG/1
2 AEROSOL, METERED RESPIRATORY (INHALATION) EVERY 6 HOURS PRN
Status: DISCONTINUED | OUTPATIENT
Start: 2020-07-21 | End: 2020-07-24 | Stop reason: HOSPADM

## 2020-07-21 RX ORDER — INSULIN ASPART 100 [IU]/ML
8 INJECTION, SOLUTION INTRAVENOUS; SUBCUTANEOUS
Status: DISCONTINUED | OUTPATIENT
Start: 2020-07-21 | End: 2020-07-22

## 2020-07-21 RX ADMIN — Medication 10 ML: at 12:07

## 2020-07-21 RX ADMIN — INSULIN ASPART 4 UNITS: 100 INJECTION, SOLUTION INTRAVENOUS; SUBCUTANEOUS at 02:07

## 2020-07-21 RX ADMIN — DEXAMETHASONE 6 MG: 2 TABLET ORAL at 08:07

## 2020-07-21 RX ADMIN — THERA TABS 1 TABLET: TAB at 08:07

## 2020-07-21 RX ADMIN — Medication 10 ML: at 06:07

## 2020-07-21 RX ADMIN — HEPARIN SODIUM 5000 UNITS: 5000 INJECTION INTRAVENOUS; SUBCUTANEOUS at 02:07

## 2020-07-21 RX ADMIN — FAMOTIDINE 20 MG: 20 TABLET ORAL at 08:07

## 2020-07-21 RX ADMIN — INSULIN ASPART 8 UNITS: 100 INJECTION, SOLUTION INTRAVENOUS; SUBCUTANEOUS at 05:07

## 2020-07-21 RX ADMIN — HEPARIN SODIUM 5000 UNITS: 5000 INJECTION INTRAVENOUS; SUBCUTANEOUS at 06:07

## 2020-07-21 RX ADMIN — ATORVASTATIN CALCIUM 40 MG: 40 TABLET, FILM COATED ORAL at 08:07

## 2020-07-21 RX ADMIN — INSULIN ASPART 4 UNITS: 100 INJECTION, SOLUTION INTRAVENOUS; SUBCUTANEOUS at 09:07

## 2020-07-21 RX ADMIN — LISINOPRIL 10 MG: 5 TABLET ORAL at 08:07

## 2020-07-21 RX ADMIN — GABAPENTIN 300 MG: 300 CAPSULE ORAL at 08:07

## 2020-07-21 RX ADMIN — OXYCODONE HYDROCHLORIDE AND ACETAMINOPHEN 500 MG: 500 TABLET ORAL at 08:07

## 2020-07-21 RX ADMIN — AMLODIPINE BESYLATE 10 MG: 5 TABLET ORAL at 08:07

## 2020-07-21 RX ADMIN — OXYCODONE HYDROCHLORIDE AND ACETAMINOPHEN 500 MG: 500 TABLET ORAL at 09:07

## 2020-07-21 RX ADMIN — PIPERACILLIN AND TAZOBACTAM 4.5 G: 4; .5 INJECTION, POWDER, LYOPHILIZED, FOR SOLUTION INTRAVENOUS; PARENTERAL at 04:07

## 2020-07-21 RX ADMIN — INSULIN ASPART 6 UNITS: 100 INJECTION, SOLUTION INTRAVENOUS; SUBCUTANEOUS at 08:07

## 2020-07-21 RX ADMIN — Medication 10 ML: at 05:07

## 2020-07-21 RX ADMIN — Medication 10 ML: at 08:07

## 2020-07-21 RX ADMIN — PIPERACILLIN AND TAZOBACTAM 4.5 G: 4; .5 INJECTION, POWDER, LYOPHILIZED, FOR SOLUTION INTRAVENOUS; PARENTERAL at 08:07

## 2020-07-21 RX ADMIN — GABAPENTIN 300 MG: 300 CAPSULE ORAL at 09:07

## 2020-07-21 RX ADMIN — HEPARIN SODIUM 5000 UNITS: 5000 INJECTION INTRAVENOUS; SUBCUTANEOUS at 09:07

## 2020-07-21 NOTE — ASSESSMENT & PLAN NOTE
-BP was normal on admit.  Mildly elevated today.  -At home takes norvasc, diltiazem, lisinopril and chlorthalidone  -Odd to take two calcium channel blockers. DC diltiazem at discharge. Will also DC chlorthalidone at discharge  -Will continue norvasc and lisinopril.  -Continue prn hydralazine.

## 2020-07-21 NOTE — ASSESSMENT & PLAN NOTE
-His father just passed away from covid infection and he has tested positive in the ER  -He remains afebrile without any symptoms at this time.  O2 sats dropped to 94% but improved now.  Not requiring supplemental O2.  -Continue covid isolation  -Continue vitamin C and multivitamin  -O2 sats dropped to 94.  Continue dexamethasone

## 2020-07-21 NOTE — ASSESSMENT & PLAN NOTE
Osteomyelitis of 5th metatarsal presumed to be acute while awaiting biopsy results. Wound cultures with Myroides and Peptostreptococcus. Preliminary bone cultures with Peptostreptococcus. Leukocytosis likely a leukemoid reaction to dexamethasone.    · Agree with Zosyn.   · Discussed with Dr. Carrasco. Agree with CT imaging to assess for fluid collections.   · Ancipitate need for  6 weeks of IV antibiotics if no further debridement needed. EOC:  9/1/2020  · Will repeat ESR and CRP tomorrow.  · Please discuss PICC placement with his nephrologist.   · Will need weekly CBC, CMP, ESR and CRP.   · Discontinue vancomycin.

## 2020-07-21 NOTE — PROCEDURES
"Catalino Mcfadden is a 55 y.o. male patient.    Temp: 98.5 °F (36.9 °C) (07/20/20 1617)  Pulse: 77 (07/20/20 1947)  Resp: 18 (07/20/20 1947)  BP: 137/82 (07/20/20 1617)  SpO2: 95 % (07/20/20 1947)  Weight: 112 kg (246 lb 14.6 oz) (07/16/20 1956)  Height: 6' 1" (185.4 cm) (07/16/20 1956)    PICC  Date/Time: 7/20/2020 8:20 PM  Performed by: Humberto Kennedy RN  Consent Done: Yes  Time out: Immediately prior to procedure a time out was called to verify the correct patient, procedure, equipment, support staff and site/side marked as required  Indications: med administration  Anesthesia: local infiltration  Local anesthetic: lidocaine 1% without epinephrine  Anesthetic Total (mL): 1  Preparation: skin prepped with ChloraPrep  Skin prep agent dried: skin prep agent completely dried prior to procedure  Sterile barriers: all five maximum sterile barriers used - cap, mask, sterile gown, sterile gloves, and large sterile sheet  Hand hygiene: hand hygiene performed prior to central venous catheter insertion  Location details: right basilic  Catheter type: double lumen  Catheter size: 5 Fr  Catheter Length: 36cm    Ultrasound guidance: yes  Vessel Caliber: medium and large and patent, compressibility normal  Needle advanced into vessel with real time Ultrasound guidance.  Guidewire confirmed in vessel.  Sterile sheath used.  Number of attempts: 1  Post-procedure: blood return through all ports, chlorhexidine patch and sterile dressing applied  Estimated blood loss (mL): 0            Humberto Kennedy  7/20/2020    "

## 2020-07-21 NOTE — PROGRESS NOTES
[facility]  Infectious Disease  Progress Note    Patient Name: Catalino Mcfadden  MRN: [id number]  Admission Date: 7/16/2020  Length of Stay: 5 days  Attending Physician: Cande Aguilar MD  Primary Care Provider: Azikiwe K Lombard, MD    Isolation Status: Airborne and Contact and Droplet  Assessment/Plan:      * Acute osteomyelitis of metatarsal bone of left foot  Osteomyelitis of 5th metatarsal presumed to be acute while awaiting biopsy results. Wound cultures with Myroides and Peptostreptococcus. Preliminary bone cultures with Peptostreptococcus. Leukocytosis likely a leukemoid reaction to dexamethasone.    · Agree with Zosyn.   · Discussed with Dr. Carrasco. Agree with CT imaging to assess for fluid collections.   · Ancipitate need for  6 weeks of IV antibiotics if no further debridement needed. EOC:  9/1/2020  · Will repeat ESR and CRP tomorrow.  · Please discuss PICC placement with his nephrologist.   · Will need weekly CBC, CMP, ESR and CRP.   · Discontinue vancomycin.     COVID-19 virus infection  Currently not requiring oxygen. On dexamethasone.   · Defer management to primary service.       Anticipated Disposition: per primary    Thank you for your consult. I will follow-up with patient. Please contact us if you have any additional questions.    Pat Resendiz MD  Infectious Disease  [facility]    Subjective:     Principal Problem:Acute osteomyelitis of metatarsal bone of left foot    HPI: A 55-year-old man with HTN, DM, CKD 3, and diabetic foot ulcer who had worsening left foot ulcer. He had been previously treated with Cipro as he left [facility] to help care for his father who had SARS-CoV 2 infection. After returning he underwent diagnostic imaging which showed changes consistent with osteomyelitis. He was admitted to [facility] and tested for SARS-CoV 2 which was subsequently positive. He was evaluated by podiatry and underwent debridement with bone biopsy. Initial wound  "cultures showed Myroides spp and Peptostreptococcus anaerobius. Bone cultures from 7/17 with Peptostreptococcus and aerobic cultures still in process.     Mr. Mcfadden has NKDA. He does not have pets. Denies toxic habits.     Infectious Diseases consulted for antibiotic management recommendations.         Interval History: "OK". SARS-CoV 2 infection and 5th metatarsal osteomyelitis due to Myroides and Peptostreptococcus. S/P I&D. Antibiotic therapy optimized to Zosyn monotherapy on 7/21 based on culture results. Leukocytosis noted suspected to be leukemoid reaction to steroids.     Review of Systems   Constitutional: Negative for chills, fatigue and fever.   HENT: Negative for ear pain, mouth sores, nosebleeds, postnasal drip, rhinorrhea, sinus pressure, sore throat, tinnitus, trouble swallowing and voice change.    Eyes: Negative for photophobia, pain, redness and visual disturbance.   Respiratory: Negative for apnea, cough, chest tightness, shortness of breath and wheezing.    Cardiovascular: Negative for chest pain, palpitations and leg swelling.   Gastrointestinal: Negative for abdominal pain, blood in stool, constipation, diarrhea, nausea and vomiting.   Endocrine: Negative for cold intolerance, heat intolerance, polydipsia and polyuria.   Genitourinary: Negative for decreased urine volume, difficulty urinating, discharge, dysuria, flank pain, frequency, genital sores, hematuria, penile pain, penile swelling, scrotal swelling, testicular pain and urgency.   Musculoskeletal: Negative for arthralgias, back pain, joint swelling, myalgias and neck pain.   Skin: Positive for wound. Negative for color change and rash.   Allergic/Immunologic: Negative for environmental allergies and food allergies.   Neurological: Negative for dizziness, seizures, syncope, weakness, light-headedness, numbness and headaches.   Hematological: Negative for adenopathy. Does not bruise/bleed easily.   Psychiatric/Behavioral: Negative for " agitation, confusion, decreased concentration, hallucinations, self-injury, sleep disturbance and suicidal ideas. The patient is not nervous/anxious.      Objective:     Vital Signs (Most Recent):  Temp: 98.2 °F (36.8 °C) (07/21/20 0813)  Pulse: 81 (07/21/20 0813)  Resp: 20 (07/21/20 0813)  BP: (!) 181/92 (07/21/20 0813)  SpO2: 97 % (07/21/20 0813) Vital Signs (24h Range):  Temp:  [97.5 °F (36.4 °C)-98.6 °F (37 °C)] 98.2 °F (36.8 °C)  Pulse:  [77-88] 81  Resp:  [16-21] 20  SpO2:  [94 %-97 %] 97 %  BP: (130-181)/(80-92) 181/92     Weight: 112 kg (246 lb 14.6 oz)  Body mass index is 32.58 kg/m².    Estimated Creatinine Clearance: 32.8 mL/min (A) (based on SCr of 3.3 mg/dL (H)).    Physical Exam  Vitals signs reviewed.   Constitutional:       Appearance: He is well-developed.   HENT:      Head: Normocephalic and atraumatic.      Right Ear: External ear normal.      Left Ear: External ear normal.   Eyes:      General: No scleral icterus.        Right eye: No discharge.         Left eye: No discharge.      Conjunctiva/sclera: Conjunctivae normal.   Neck:      Musculoskeletal: Normal range of motion and neck supple.      Thyroid: No thyromegaly.   Cardiovascular:      Rate and Rhythm: Normal rate and regular rhythm.      Heart sounds: Normal heart sounds. No murmur.   Pulmonary:      Effort: Pulmonary effort is normal.      Breath sounds: Normal breath sounds. No wheezing or rales.   Abdominal:      General: Bowel sounds are normal.      Palpations: Abdomen is soft. There is no mass.      Tenderness: There is no abdominal tenderness. There is no rebound.   Musculoskeletal: Normal range of motion.      Comments: Left foot wrapped with gauze dressing.    Lymphadenopathy:      Cervical: No cervical adenopathy.   Skin:     General: Skin is warm and dry.      Comments: Tattoo's.    Neurological:      Mental Status: He is alert and oriented to person, place, and time.   Psychiatric:         Behavior: Behavior normal.          Significant Labs:   Blood Culture:   Recent Labs   Lab 07/16/20  1717 07/16/20  1722   LABBLOO No Growth to date  No Growth to date  No Growth to date  No Growth to date  No Growth to date No Growth to date  No Growth to date  No Growth to date  No Growth to date  No Growth to date     BMP:   Recent Labs   Lab 07/20/20  0553   *   *   K 5.1   CL 97   CO2 21*   BUN 82*   CREATININE 3.3*   CALCIUM 8.2*   MG 1.9     CBC:   Recent Labs   Lab 07/20/20  0553 07/21/20  0430   WBC 14.65* 18.59*   HGB 10.9* 10.9*   HCT 33.0* 33.0*    290     Wound Culture:   Recent Labs   Lab 04/20/20  1107 07/16/20  1129 07/17/20  1243 07/17/20  1258   LABAERO PSEUDOMONAS AERUGINOSA  Few  No other significant isolate  * MYROIDES SPECIES  Moderate  Skin josefa also present  * No significant isolate Further report to follow       Significant Imaging: I have reviewed all pertinent imaging results/findings within the past 24 hours.

## 2020-07-21 NOTE — PROGRESS NOTES
[facility] Hospital Medicine  Progress Note    Patient Name: Catalino Mcfadden  MRN: [id number]  Patient Class: IP- Inpatient   Admission Date: 7/16/2020  Length of Stay: 5 days  Attending Physician: Cande Aguilar MD  Primary Care Provider: Azikiwe K Lombard, MD        Subjective:     Principal Problem:Acute osteomyelitis of metatarsal bone of left foot        HPI:  Mr. Mcfadden is a 55 year old man with history of diabetes mellitus, diabetic foot ulcers, hypertension, hyperlipidemia and CKDIII who was found to have osteomyelitis on outside imaging and sent in by his podiatrist, Dr. Martinez.  He states that he has had ulcers on his left foot for quite some time and they had been stable.  Then 2.5 weeks ago he noted a blister on his left foot which subsequently popped.  After discussing with his podiatrist he was started on cipro.  At the time, he was in [place], [place] because his father was ill and in the hospital.  His father passed away with complications from covid-19 infection.  Because he was out of town attending to his father's illness, he was unable to follow up with Dr. Martinez until today.  Xray of his foot was obtained and showed osteomyelitis of his left 5th toe.  He noted a malodorous drainage and a mild achey pain.  He denied fevers, chills, nausea, vomiting, diarrhea, lethargy, malaise, shortness of breath or cough.  He notes he drinks at least a gallon of water daily.    Overview/Hospital Course:  55 year old man with history of diabetes mellitus, diabetic foot ulcers, hypertension, hyperlipidemia and CKDIII who was found to have osteomyelitis on outside imaging and sent in by his podiatrist, Dr. Martinez.  He has had bone biopsy/culture and debridement by podiatry.  Wound culture 7/16 growing Prevotella and Myroides; wound culture 7/17 growing Peptostreptococcus. Bone culture 7/17 with Peptostreptococcus.  ID consulted. PICC already in place for outpatient antibiotics. CT foot 7/21 shows  osteomyelitis and soft tissue ulceration but no abscess. On zosyn.     Also, he has covid - but is completely asymptomatic.  O2 sats got to 94 so he is being treated with dexamethasone.      Has mild hyponatremia on fluid restriction (drinks at least a gallon of water daily at home).  Had mild acute/chronic renal failure is at baseline.     Interval History: No complaints today. Eager for discharge.     Review of Systems   Constitutional: Negative for chills and fever.   Respiratory: Negative for cough and shortness of breath.    Cardiovascular: Negative for chest pain.   Gastrointestinal: Negative for abdominal pain.   Genitourinary: Negative for difficulty urinating.     Objective:     Vital Signs (Most Recent):  Temp: 97.9 °F (36.6 °C) (07/21/20 1629)  Pulse: 75 (07/21/20 1629)  Resp: 20 (07/21/20 1629)  BP: (!) 163/89 (07/21/20 1629)  SpO2: 95 % (07/21/20 1629) Vital Signs (24h Range):  Temp:  [97.5 °F (36.4 °C)-98.6 °F (37 °C)] 97.9 °F (36.6 °C)  Pulse:  [72-81] 75  Resp:  [16-20] 20  SpO2:  [94 %-97 %] 95 %  BP: (130-181)/(80-92) 163/89     Weight: 112 kg (246 lb 14.6 oz)  Body mass index is 32.58 kg/m².    Intake/Output Summary (Last 24 hours) at 7/21/2020 1635  Last data filed at 7/21/2020 1200  Gross per 24 hour   Intake 240 ml   Output --   Net 240 ml      Physical Exam  Vitals signs and nursing note reviewed.   Constitutional:       General: He is not in acute distress.     Appearance: He is obese. He is not toxic-appearing.   HENT:      Head: Normocephalic and atraumatic.   Cardiovascular:      Rate and Rhythm: Normal rate.      Pulses: Normal pulses.      Heart sounds: Normal heart sounds. No murmur. No gallop.    Pulmonary:      Effort: Pulmonary effort is normal. No respiratory distress.      Breath sounds: Normal breath sounds. No wheezing or rales.      Comments: Room air  Abdominal:      General: Abdomen is flat. Bowel sounds are normal. There is no distension.      Tenderness: There is no  abdominal tenderness. There is no guarding.   Musculoskeletal:         General: No swelling.      Comments: L foot bandaged and in boot, not removed   Skin:     General: Skin is warm and dry.   Neurological:      Mental Status: He is alert.         Significant Labs: All pertinent labs within the past 24 hours have been reviewed.    Significant Imaging: I have reviewed and interpreted all pertinent imaging results/findings within the past 24 hours.      Assessment/Plan:      * Acute osteomyelitis of metatarsal bone of left foot  -He had a diabetic foot wound that has failed outpatient treatment with cipro and radiographic evidence of osteomyelitis  -On admit he was afebrile with normal wbc and CRP elevated at 233.  -Doppler US with BLANK obtained.  Vascular surgery consulted and state he has adequate perfusion to heal his wounds.  No intervention needed at this time.  -Podiatry consulted and took patient for debridement and bone biopsy 7/18.  - Wound cultures/bone culture Myroides and Peptostreptococcus. ID consulted. On zosyn x6 weeks   - Podiatry considering wound vac    Acute renal failure superimposed on stage 3 chronic kidney disease  -Baseline Cr is 2.8-3.1 and on admit Cr is mildly above baseline at 3.8.  -Could be due to mild diminished appetite vs infection.  -Renally dose medications and avoid nephrotoxic agents  - Improving, resumed lisinopril    Hyponatremia  -Na was 128 on admit and he had no symptoms.  -On admit he appeared euvolemic and Usom 370, Ada 32 and Sosm 289.  - Discontinue chlorthalidone  - TSH low, FT4 normal  - random cortisol low at 1.5-- cortisol stim test ordered for 7/22 AM and Endocrine consulted    COVID-19 virus infection  -His father just passed away from covid infection and he has tested positive in the ER  -He remains afebrile without any symptoms at this time.  O2 sats dropped to 94% but improved now.  Not requiring supplemental O2.  -Continue covid isolation  -Continue vitamin C  and multivitamin  -O2 sats dropped to 94.  Continue dexamethasone     Diabetic ulcer of toe of left foot  -Treatment as above    Type 2 diabetes mellitus with foot ulcer, without long-term current use of insulin  -A1c 8.1  -At home takes trulicity weekly.  Hold this while inpatient.  -Continue detemir 15 BID and aspart with meals  -Continue moderate dose ISS.  -Continue diabetic diet     Hyperlipidemia, acquired  -Continue home statin      Essential hypertension  -BP was normal on admit.  Mildly elevated today.  -At home takes norvasc, diltiazem, lisinopril and chlorthalidone  -Odd to take two calcium channel blockers. DC diltiazem at discharge. Will also DC chlorthalidone at discharge  -Will continue norvasc and lisinopril.  -Continue prn hydralazine.        VTE Risk Mitigation (From admission, onward)         Ordered     heparin (porcine) injection 5,000 Units  Every 8 hours      07/16/20 1727     IP VTE HIGH RISK PATIENT  Once      07/16/20 1727     Place sequential compression device  Until discontinued      07/16/20 1727                      Cande Aguilar MD  Department of Hospital Medicine   [facility] [place]

## 2020-07-21 NOTE — SUBJECTIVE & OBJECTIVE
Interval History: No complaints today. Eager for discharge.     Review of Systems   Constitutional: Negative for chills and fever.   Respiratory: Negative for cough and shortness of breath.    Cardiovascular: Negative for chest pain.   Gastrointestinal: Negative for abdominal pain.   Genitourinary: Negative for difficulty urinating.     Objective:     Vital Signs (Most Recent):  Temp: 97.9 °F (36.6 °C) (07/21/20 1629)  Pulse: 75 (07/21/20 1629)  Resp: 20 (07/21/20 1629)  BP: (!) 163/89 (07/21/20 1629)  SpO2: 95 % (07/21/20 1629) Vital Signs (24h Range):  Temp:  [97.5 °F (36.4 °C)-98.6 °F (37 °C)] 97.9 °F (36.6 °C)  Pulse:  [72-81] 75  Resp:  [16-20] 20  SpO2:  [94 %-97 %] 95 %  BP: (130-181)/(80-92) 163/89     Weight: 112 kg (246 lb 14.6 oz)  Body mass index is 32.58 kg/m².    Intake/Output Summary (Last 24 hours) at 7/21/2020 1635  Last data filed at 7/21/2020 1200  Gross per 24 hour   Intake 240 ml   Output --   Net 240 ml      Physical Exam  Vitals signs and nursing note reviewed.   Constitutional:       General: He is not in acute distress.     Appearance: He is obese. He is not toxic-appearing.   HENT:      Head: Normocephalic and atraumatic.   Cardiovascular:      Rate and Rhythm: Normal rate.      Pulses: Normal pulses.      Heart sounds: Normal heart sounds. No murmur. No gallop.    Pulmonary:      Effort: Pulmonary effort is normal. No respiratory distress.      Breath sounds: Normal breath sounds. No wheezing or rales.      Comments: Room air  Abdominal:      General: Abdomen is flat. Bowel sounds are normal. There is no distension.      Tenderness: There is no abdominal tenderness. There is no guarding.   Musculoskeletal:         General: No swelling.      Comments: L foot bandaged and in boot, not removed   Skin:     General: Skin is warm and dry.   Neurological:      Mental Status: He is alert.         Significant Labs: All pertinent labs within the past 24 hours have been reviewed.    Significant  Imaging: I have reviewed and interpreted all pertinent imaging results/findings within the past 24 hours.

## 2020-07-21 NOTE — ASSESSMENT & PLAN NOTE
-Na was 128 on admit and he had no symptoms.  -On admit he appeared euvolemic and Usom 370, Ada 32 and Sosm 289.  - Discontinue chlorthalidone  - TSH low, FT4 normal  - random cortisol low at 1.5-- cortisol stim test ordered for 7/22 AM and Endocrine consulted

## 2020-07-21 NOTE — PLAN OF CARE
Problem: Fall Injury Risk  Goal: Absence of Fall and Fall-Related Injury  Intervention: Identify and Manage Contributors to Fall Injury Risk  Flowsheets (Taken 7/21/2020 1834)  Self-Care Promotion: independence encouraged  Medication Review/Management:   medications reviewed   high risk medications identified     Problem: Fall Injury Risk  Goal: Absence of Fall and Fall-Related Injury  Intervention: Promote Injury-Free Environment  Flowsheets (Taken 7/21/2020 1834)  Safety Promotion/Fall Prevention:   assistive device/personal item within reach   side rails raised x 2   Fall Risk reviewed with patient/family   Fall Risk signage in place   diversional activities provided   No falls or injuries noted. No complains of pain

## 2020-07-21 NOTE — ASSESSMENT & PLAN NOTE
-He had a diabetic foot wound that has failed outpatient treatment with cipro and radiographic evidence of osteomyelitis  -On admit he was afebrile with normal wbc and CRP elevated at 233.  -Doppler US with BLANK obtained.  Vascular surgery consulted and state he has adequate perfusion to heal his wounds.  No intervention needed at this time.  -Podiatry consulted and took patient for debridement and bone biopsy 7/18.  - Wound cultures/bone culture Myroides and Peptostreptococcus. ID consulted. On zosyn x6 weeks   - Podiatry considering wound vac

## 2020-07-21 NOTE — SUBJECTIVE & OBJECTIVE
"Interval History: "OK". SARS-CoV 2 infection and 5th metatarsal osteomyelitis due to Myroides and Peptostreptococcus. S/P I&D. Antibiotic therapy optimized to Zosyn monotherapy on 7/21 based on culture results. Leukocytosis noted suspected to be leukemoid reaction to steroids.     Review of Systems   Constitutional: Negative for chills, fatigue and fever.   HENT: Negative for ear pain, mouth sores, nosebleeds, postnasal drip, rhinorrhea, sinus pressure, sore throat, tinnitus, trouble swallowing and voice change.    Eyes: Negative for photophobia, pain, redness and visual disturbance.   Respiratory: Negative for apnea, cough, chest tightness, shortness of breath and wheezing.    Cardiovascular: Negative for chest pain, palpitations and leg swelling.   Gastrointestinal: Negative for abdominal pain, blood in stool, constipation, diarrhea, nausea and vomiting.   Endocrine: Negative for cold intolerance, heat intolerance, polydipsia and polyuria.   Genitourinary: Negative for decreased urine volume, difficulty urinating, discharge, dysuria, flank pain, frequency, genital sores, hematuria, penile pain, penile swelling, scrotal swelling, testicular pain and urgency.   Musculoskeletal: Negative for arthralgias, back pain, joint swelling, myalgias and neck pain.   Skin: Positive for wound. Negative for color change and rash.   Allergic/Immunologic: Negative for environmental allergies and food allergies.   Neurological: Negative for dizziness, seizures, syncope, weakness, light-headedness, numbness and headaches.   Hematological: Negative for adenopathy. Does not bruise/bleed easily.   Psychiatric/Behavioral: Negative for agitation, confusion, decreased concentration, hallucinations, self-injury, sleep disturbance and suicidal ideas. The patient is not nervous/anxious.      Objective:     Vital Signs (Most Recent):  Temp: 98.2 °F (36.8 °C) (07/21/20 0813)  Pulse: 81 (07/21/20 0813)  Resp: 20 (07/21/20 0813)  BP: (!) 181/92 " (07/21/20 0813)  SpO2: 97 % (07/21/20 0813) Vital Signs (24h Range):  Temp:  [97.5 °F (36.4 °C)-98.6 °F (37 °C)] 98.2 °F (36.8 °C)  Pulse:  [77-88] 81  Resp:  [16-21] 20  SpO2:  [94 %-97 %] 97 %  BP: (130-181)/(80-92) 181/92     Weight: 112 kg (246 lb 14.6 oz)  Body mass index is 32.58 kg/m².    Estimated Creatinine Clearance: 32.8 mL/min (A) (based on SCr of 3.3 mg/dL (H)).    Physical Exam  Vitals signs reviewed.   Constitutional:       Appearance: He is well-developed.   HENT:      Head: Normocephalic and atraumatic.      Right Ear: External ear normal.      Left Ear: External ear normal.   Eyes:      General: No scleral icterus.        Right eye: No discharge.         Left eye: No discharge.      Conjunctiva/sclera: Conjunctivae normal.   Neck:      Musculoskeletal: Normal range of motion and neck supple.      Thyroid: No thyromegaly.   Cardiovascular:      Rate and Rhythm: Normal rate and regular rhythm.      Heart sounds: Normal heart sounds. No murmur.   Pulmonary:      Effort: Pulmonary effort is normal.      Breath sounds: Normal breath sounds. No wheezing or rales.   Abdominal:      General: Bowel sounds are normal.      Palpations: Abdomen is soft. There is no mass.      Tenderness: There is no abdominal tenderness. There is no rebound.   Musculoskeletal: Normal range of motion.      Comments: Left foot wrapped with gauze dressing.    Lymphadenopathy:      Cervical: No cervical adenopathy.   Skin:     General: Skin is warm and dry.      Comments: Tattoo's.    Neurological:      Mental Status: He is alert and oriented to person, place, and time.   Psychiatric:         Behavior: Behavior normal.         Significant Labs:   Blood Culture:   Recent Labs   Lab 07/16/20  1717 07/16/20  1722   LABBLOO No Growth to date  No Growth to date  No Growth to date  No Growth to date  No Growth to date No Growth to date  No Growth to date  No Growth to date  No Growth to date  No Growth to date     BMP:   Recent  Labs   Lab 07/20/20  0553   *   *   K 5.1   CL 97   CO2 21*   BUN 82*   CREATININE 3.3*   CALCIUM 8.2*   MG 1.9     CBC:   Recent Labs   Lab 07/20/20  0553 07/21/20  0430   WBC 14.65* 18.59*   HGB 10.9* 10.9*   HCT 33.0* 33.0*    290     Wound Culture:   Recent Labs   Lab 04/20/20  1107 07/16/20  1129 07/17/20  1243 07/17/20  1258   LABAERO PSEUDOMONAS AERUGINOSA  Few  No other significant isolate  * MYROIDES SPECIES  Moderate  Skin josefa also present  * No significant isolate Further report to follow       Significant Imaging: I have reviewed all pertinent imaging results/findings within the past 24 hours.

## 2020-07-21 NOTE — PLAN OF CARE
07/21/20 1426   Discharge Reassessment   Assessment Type Discharge Planning Reassessment   Patient plans to return home with wife.  Per MDT's this am MD stated that patient will need abx for 6 weeks post DC.  MD stated that patient is not a candidate for a PICC line and may need a tunneled TLC.  Patient may also need wound vac.  Waiting for final recommendations from MD to discuss best DC plan with patient.  [place] to continue to follow and assist with DC plan.    In the interest of DC planning [place] sent message to Dr Martinez asking if wound vac will be part of his plan so that a form can be placed in patient's folder for MD to complete.  Once confirmed, [place] will speak with patient and wife (his help at home) to explain the need for the wound vac and establish comfort level of caregiver.    1501:  Message received from Dr Martinez stating that the patient will need a wound vac.  TN to place wound vac form in patient's blue folder for MD to complete.

## 2020-07-21 NOTE — ASSESSMENT & PLAN NOTE
-A1c 8.1  -At home takes trulicity weekly.  Hold this while inpatient.  -Continue detemir 15 BID and aspart with meals  -Continue moderate dose ISS.  -Continue diabetic diet

## 2020-07-21 NOTE — PLAN OF CARE
Maintain oxygen saturation goals and decrease SOB, Pt states he does not feel SOB and does not require bronchodilator

## 2020-07-21 NOTE — ASSESSMENT & PLAN NOTE
-Baseline Cr is 2.8-3.1 and on admit Cr is mildly above baseline at 3.8.  -Could be due to mild diminished appetite vs infection.  -Renally dose medications and avoid nephrotoxic agents  - Improving, resumed lisinopril

## 2020-07-21 NOTE — PROGRESS NOTES
[facility]  Podiatry  Progress Note    Patient Name: Catalino Mcfadden  MRN: [id number]  Admission Date: 7/16/2020  Hospital Length of Stay: 5 days  Attending Physician: Cande Aguilar MD  Primary Care Provider: Azikiwe K Lombard, MD     Subjective:     History of Present Illness: 56 y/o male PMH DM2, CKD admitted for left foot infection. Reports left foot ulceration x 3 weeks, thinks this started due to rubbing of brace on the lateral foot. Pt. Followed by Dr. Martinez per chart review contacted the office on 6/29/20 stating that he was heading out of town to [place] to check on his father. Rx. Cipro then sent to pharmacy which patient reports taking. He returned to [place] yesterday and was seen by . Xray left foot ordered revealing OM, gas forming infection. Sent to ED for admission. Denies N/V/F. Reports some chills.     7/18/20: S/p one day left foot debridement, bone biopsy. Denies pedal pain. CAM boot intact left foot.     7/19/20: S/p 2 days left foot debridement, bone biopsy. CAM boot in place left foot.     7/20/20: Patient seen bedside s/p 3 days left foot debridement. CAM boot intact.     07/21/2020 Patient seen bedside s/p 4 days left foot debridement. CAM boot intact.     Scheduled Meds:   amLODIPine  10 mg Oral Daily    ascorbic acid (vitamin C)  500 mg Oral BID    atorvastatin  40 mg Oral Daily    dexAMETHasone  6 mg Oral Daily    famotidine  20 mg Oral Daily    gabapentin  300 mg Oral BID    heparin (porcine)  5,000 Units Subcutaneous Q8H    insulin detemir U-100  15 Units Subcutaneous QHS    insulin detemir U-100  15 Units Subcutaneous Daily    lisinopriL  10 mg Oral Daily    multivitamin  1 tablet Oral Daily    piperacillin-tazobactam (ZOSYN) IVPB  4.5 g Intravenous Q8H    sodium chloride 0.9%  10 mL Intravenous Q6H     Continuous Infusions:    PRN Meds:acetaminophen, albuterol **AND** MDI QID, benzonatate, dextrose 50%, dextrose 50%, glucagon (human recombinant),  glucose, glucose, hydrALAZINE, insulin aspart U-100, ondansetron, simethicone, sodium chloride 0.9%, Flushing PICC Protocol **AND** sodium chloride 0.9% **AND** sodium chloride 0.9%    Review of patient's allergies indicates:   Allergen Reactions    Morphine Hallucinations        Past Medical History:   Diagnosis Date    CKD (chronic kidney disease), stage III 07/16/2020    CKD stage 3 due to type 1 diabetes mellitus     CKD stage 3 due to type 2 diabetes mellitus     Diabetes mellitus, type 2     Diabetic foot ulcer associated with diabetes mellitus due to underlying condition 07/16/2020    Diabetic foot ulcers     Hyperlipidemia     Hypertension      Past Surgical History:   Procedure Laterality Date    BONE BIOPSY Left 7/17/2020    Procedure: BIOPSY, BONE;  Surgeon: Verona Whitmore DPM;  Location: [facility] OR;  Service: Podiatry;  Laterality: Left;    CERVICAL DISC SURGERY  07/11/2016    DEBRIDEMENT Left 7/17/2020    Procedure: DEBRIDEMENT;  Surgeon: Verona Whitmore DPM;  Location: [facility] OR;  Service: Podiatry;  Laterality: Left;    left leg orthopedic surgery Left        Family History     Problem Relation (Age of Onset)    Heart disease Father        Tobacco Use    Smoking status: Never Smoker    Smokeless tobacco: Never Used   Substance and Sexual Activity    Alcohol use: Not Currently     Alcohol/week: 0.0 standard drinks     Comment: social    Drug use: No    Sexual activity: Not on file     Review of Systems   Constitutional: Positive for activity change.   Respiratory: Negative for shortness of breath.    Cardiovascular: Negative for chest pain and leg swelling.   Gastrointestinal: Negative for nausea and vomiting.   Musculoskeletal: Positive for arthralgias.   Neurological: Positive for numbness. Negative for weakness.     Objective:     Vital Signs (Most Recent):  Temp: 98.2 °F (36.8 °C) (07/21/20 1142)  Pulse: 72 (07/21/20 1142)  Resp: 20 (07/21/20 1142)  BP: (!) 171/91 (07/21/20  1142)  SpO2: 97 % (07/21/20 1142) Vital Signs (24h Range):  Temp:  [97.5 °F (36.4 °C)-98.6 °F (37 °C)] 98.2 °F (36.8 °C)  Pulse:  [72-88] 72  Resp:  [16-21] 20  SpO2:  [94 %-97 %] 97 %  BP: (130-181)/(80-92) 171/91     Weight: 112 kg (246 lb 14.6 oz)  Body mass index is 32.58 kg/m².    Foot Exam    General  Orientation: alert and oriented to person, place, and time       Right Foot/Ankle     Inspection and Palpation  Tenderness: none   Swelling: none   Skin Exam: no ulcer     Neurovascular  Dorsalis pedis: 1+  Posterior tibial: 1+      Left Foot/Ankle      Inspection and Palpation  Tenderness: metatarsals   Swelling: none   Skin Exam: ulcer;     Neurovascular  Dorsalis pedis: 1+  Posterior tibial: 1+          07/21/2020    Sutures intact skin edges well coapted.   Wound 1: Left plantar foot ulceration   Measurement: 2jxt5koi0.8cm  Base: fibrogranular base and bone  Periwound skin: HPK  Drainage: serous  Probe: probes to bone.           7/20/20:    Pre debridement       Post debridement.             7/19/20:    Sutures intact skin edges well coapted.   Wound 1: Left plantar foot ulceration   Measurement: [id number]  Base: fibrogranular base   Periwound skin: HPK  Drainage: none  Probe: probes to bone.   No purulent drainage noted.           7/18/20:  Wound 1: Left plantar foot ulceration   Measurement: [id number]  Base: fibrogranular base   Periwound skin: HPK  Drainage: none  Probe: probes to bone.   No purulent drainage noted.                   7/17/20:  Ulcer location: Left  lateral 5th metatarsal base   Signs of infection: +malodor, local edema and erythema  Drainage: Sero-Sanguinous  Purulence: no  Crepitus/fluctuance: no  Periwound: Reddened, Macerated, Calloused  Base: Fibrotic slough  Depth: muscle  Probe to bone: yes                Laboratory:  CBC:   Recent Labs   Lab 07/21/20  0430   WBC 18.59*   RBC 4.08*   HGB 10.9*   HCT 33.0*      MCV 81*   MCH 26.7*   MCHC 33.0     CMP:   Recent Labs   Lab  07/20/20  0553   *   CALCIUM 8.2*   ALBUMIN 2.2*   PROT 6.7   *   K 5.1   CO2 21*   CL 97   BUN 82*   CREATININE 3.3*   ALKPHOS 314*   ALT 34   AST 33   BILITOT 0.2       Diagnostic Results:  Xray: X-Ray Foot Complete Left  Order: [id number]  Status:  Final result   Visible to patient:  No (not released) Next appt:  07/23/2020 at 10:15 AM in Podiatry (Aviva Martinez DPM) Dx:  Type II diabetes mellitus with neurol...  Details    Reading Physician Reading Date Result Priority   Segundo Brewster Jr., MD  [phone] 7/16/2020 Routine      Narrative & Impression     EXAMINATION:  XR FOOT COMPLETE 3 VIEW LEFT     CLINICAL HISTORY:  wound to lateral base of 5th met; Type 2 diabetes mellitus with other diabetic neurological complication     TECHNIQUE:  XR FOOT COMPLETE 3 VIEW LEFT     COMPARISON:  None     FINDINGS:  There is soft tissue swelling of the midfoot and forefoot with soft tissue ulceration and soft tissue gas adjacent to the base of the 5th metatarsal bone.  There is abnormal sclerosis of the base of the 5th metatarsal shaft which is suspicious for chronic osteomyelitis in light of the adjacent soft tissue ulcer.  Throughout the midfoot there are vague areas of cystic change, there is joint disorganization and loss of normal anatomy suggestive of Charcot arthropathy.  Superimposed septic arthritis and osteomyelitis difficult to exclude with certainty.  There is severe hallux valgus deformity with areas of heterotopic ossification involving the plantar soft tissues of the foot.  Dorsal and plantar calcaneal spurs are present and there are remote fracture deformities of the distal fibula and posterior malleolus.     Impression:     Lateral midfoot soft tissue ulcer and suspected osteomyelitis of the 5th metatarsal bone.     Abnormalities throughout the midfoot which may reflect Charcot arthropathy and or septic arthritis/osteomyelitis     Posttraumatic changes of the distal tibia and fibula and  arthritic changes of the 1st MTP joint.                 US Lower Extrem Arteries Bilat with BLANK  Order: [id number]  Status:  Final result   Visible to patient:  No (not released) Next appt:  07/23/2020 at 10:15 AM in Podiatry (Aviva Martinez DPM)  Details    Reading Physician Reading Date Result Priority   Porsha Mcgowan MD  [phone] 7/16/2020 STAT      Narrative & Impression     EXAMINATION:  US ARTERIAL LOWER EXTREMITY BILAT WITH BLANK (XPD)     CLINICAL HISTORY:  Eval for PVD;     TECHNIQUE:  Bilateral ankle-brachial indices as well as bilateral spectral, color and grayscale images of the large arteries of both lower extremities were performed.     COMPARISON:  None     FINDINGS:  ABIs are 0.9 on the right and 0.9 on the left.     Right lower extremity:     Common femoral artery: 96 cm/sec, triphasic     SFA proximal: 76 cm/sec, triphasic     SFA mid: 74 cm/sec, triphasic     SFA distal: 235 cm/sec, triphasic     Popliteal artery: 95 cm/sec, triphasic     Posterior tibial artery: 70 cm/sec     Anterior tibial artery: 46 cm/sec     Peroneal artery: 28 cm/sec     Left lower extremity:     Common femoral artery: 134 cm/sec, triphasic     SFA proximal: 111 cm/sec, triphasic     SFA mid: 98 cm/sec, triphasic     SFA distal: 116 cm/sec, triphasic     Popliteal artery: 93 cm/sec, biphasic     Posterior tibial artery: 106 cm/sec     Anterior tibial artery: 44 cm/sec     Peroneal artery: 78 cm/sec     Impression:     1. ABIs measure 0.9 bilaterally.  2. Greater than doubling of velocities seen between the mid to distal right SFA which may indicate hemodynamically significant stenosis, although normal triphasic waveforms are maintained.  3. Otherwise no additional high-grade stenosis or occlusion seen involving the bilateral lower extremity arteries.                  Clinical Findings:  Left lateral foot ulceration with probe to bone.     Assessment/Plan:     Active Diagnoses:    Diagnosis Date Noted POA    PRINCIPAL  PROBLEM:  Acute osteomyelitis of metatarsal bone of left foot [M86.172] 07/16/2020 Yes    Pyogenic inflammation of bone [M86.9]  Unknown    Diabetic ulcer of toe of left foot [E11.621, L97.529] 07/16/2020 Yes    COVID-19 virus infection [U07.1] 07/16/2020 Yes    Hyponatremia [E87.1] 07/16/2020 Yes    Acute renal failure superimposed on stage 3 chronic kidney disease [N17.9, N18.3] 07/16/2020 Yes    Type 2 diabetes mellitus with foot ulcer, without long-term current use of insulin [E11.621, L97.509] 11/08/2019 Yes    Hyperlipidemia, acquired [E78.5] 12/24/2014 Yes    Essential hypertension [I10] 08/26/2014 Yes      Problems Resolved During this Admission:         Greater than 50% of this visit spent on counseling and coordination of care.    Education about the prevention of limb loss.    Discussed wound healing cycle, skin integrity, ways to care for skin.Counseled patient on the effects of blood glucose on healing. He verbalizes understanding that it can increase the chances of delayed healing and this prolonged exposure leads to infection or progression of infection which subsequently can result in loss of limb.    The wound is cleansed of foreign material as much as possible and the base inspected for bone or abscess.  No active purulence noted on today's encounter.  Bone still noted to wound bed.      Leukocytosis noted on labs for 2 days.  Discussed case with Infectious Disease. Leukocytosis likely a leukemoid reaction to dexamethasone.  STAT CT ordered to insure theres are no deep fluid collections noted.    Continue nursing orders placed for vashe wet to dry BID.     Vascular surgery consulted: per vascular no intervention at this time.    Will consider wound VAC application pending clinical course.      Podiatry will follow.      Thank you for your consult. I will follow-up with patient. Please contact us if you have any additional questions.    Aviva Martinez DPM  Podiatry  [facility]

## 2020-07-22 PROBLEM — E87.5 HYPERKALEMIA: Status: ACTIVE | Noted: 2020-07-22

## 2020-07-22 LAB
ALBUMIN SERPL BCP-MCNC: 1.9 G/DL (ref 3.5–5.2)
ALP SERPL-CCNC: 310 U/L (ref 55–135)
ALT SERPL W/O P-5'-P-CCNC: 37 U/L (ref 10–44)
ANION GAP SERPL CALC-SCNC: 7 MMOL/L (ref 8–16)
AST SERPL-CCNC: 27 U/L (ref 10–40)
BASOPHILS # BLD AUTO: 0.04 K/UL (ref 0–0.2)
BASOPHILS NFR BLD: 0.2 % (ref 0–1.9)
BILIRUB SERPL-MCNC: 0.2 MG/DL (ref 0.1–1)
BUN SERPL-MCNC: 94 MG/DL (ref 6–20)
CALCIUM SERPL-MCNC: 8.2 MG/DL (ref 8.7–10.5)
CHLORIDE SERPL-SCNC: 99 MMOL/L (ref 95–110)
CO2 SERPL-SCNC: 21 MMOL/L (ref 23–29)
CORTIS SERPL-MCNC: <1 UG/DL
CORTIS SERPL-MCNC: <1 UG/DL
CREAT SERPL-MCNC: 3.6 MG/DL (ref 0.5–1.4)
CRP SERPL-MCNC: 58.7 MG/L (ref 0–8.2)
DIFFERENTIAL METHOD: ABNORMAL
EOSINOPHIL # BLD AUTO: 0 K/UL (ref 0–0.5)
EOSINOPHIL NFR BLD: 0 % (ref 0–8)
ERYTHROCYTE [DISTWIDTH] IN BLOOD BY AUTOMATED COUNT: 12 % (ref 11.5–14.5)
EST. GFR  (AFRICAN AMERICAN): 21 ML/MIN/1.73 M^2
EST. GFR  (NON AFRICAN AMERICAN): 18 ML/MIN/1.73 M^2
FERRITIN SERPL-MCNC: 520 NG/ML (ref 20–300)
FINAL PATHOLOGIC DIAGNOSIS: NORMAL
GLUCOSE SERPL-MCNC: 396 MG/DL (ref 70–110)
GROSS: NORMAL
HCT VFR BLD AUTO: 33.7 % (ref 40–54)
HGB BLD-MCNC: 10.9 G/DL (ref 14–18)
IMM GRANULOCYTES # BLD AUTO: 0.82 K/UL (ref 0–0.04)
IMM GRANULOCYTES NFR BLD AUTO: 4.6 % (ref 0–0.5)
LYMPHOCYTES # BLD AUTO: 0.9 K/UL (ref 1–4.8)
LYMPHOCYTES NFR BLD: 4.9 % (ref 18–48)
MAGNESIUM SERPL-MCNC: 1.9 MG/DL (ref 1.6–2.6)
MCH RBC QN AUTO: 26.1 PG (ref 27–31)
MCHC RBC AUTO-ENTMCNC: 32.3 G/DL (ref 32–36)
MCV RBC AUTO: 81 FL (ref 82–98)
MONOCYTES # BLD AUTO: 0.9 K/UL (ref 0.3–1)
MONOCYTES NFR BLD: 5.3 % (ref 4–15)
NEUTROPHILS # BLD AUTO: 15.1 K/UL (ref 1.8–7.7)
NEUTROPHILS NFR BLD: 85 % (ref 38–73)
NRBC BLD-RTO: 0 /100 WBC
PLATELET # BLD AUTO: 278 K/UL (ref 150–350)
PMV BLD AUTO: 9.3 FL (ref 9.2–12.9)
POCT GLUCOSE: 337 MG/DL (ref 70–110)
POCT GLUCOSE: 359 MG/DL (ref 70–110)
POCT GLUCOSE: 360 MG/DL (ref 70–110)
POCT GLUCOSE: 402 MG/DL (ref 70–110)
POTASSIUM SERPL-SCNC: 5.3 MMOL/L (ref 3.5–5.1)
PROT SERPL-MCNC: 6.4 G/DL (ref 6–8.4)
RBC # BLD AUTO: 4.17 M/UL (ref 4.6–6.2)
SODIUM SERPL-SCNC: 127 MMOL/L (ref 136–145)
WBC # BLD AUTO: 17.82 K/UL (ref 3.9–12.7)

## 2020-07-22 PROCEDURE — 99900035 HC TECH TIME PER 15 MIN (STAT)

## 2020-07-22 PROCEDURE — 85025 COMPLETE CBC W/AUTO DIFF WBC: CPT

## 2020-07-22 PROCEDURE — 63600175 PHARM REV CODE 636 W HCPCS: Performed by: HOSPITALIST

## 2020-07-22 PROCEDURE — 36415 COLL VENOUS BLD VENIPUNCTURE: CPT

## 2020-07-22 PROCEDURE — 25000003 PHARM REV CODE 250: Performed by: HOSPITALIST

## 2020-07-22 PROCEDURE — 11000001 HC ACUTE MED/SURG PRIVATE ROOM

## 2020-07-22 PROCEDURE — A4216 STERILE WATER/SALINE, 10 ML: HCPCS | Performed by: HOSPITALIST

## 2020-07-22 PROCEDURE — 80053 COMPREHEN METABOLIC PANEL: CPT

## 2020-07-22 PROCEDURE — 99232 SBSQ HOSP IP/OBS MODERATE 35: CPT | Mod: ,,, | Performed by: INTERNAL MEDICINE

## 2020-07-22 PROCEDURE — 25000242 PHARM REV CODE 250 ALT 637 W/ HCPCS: Performed by: HOSPITALIST

## 2020-07-22 PROCEDURE — 94761 N-INVAS EAR/PLS OXIMETRY MLT: CPT

## 2020-07-22 PROCEDURE — 82533 TOTAL CORTISOL: CPT | Mod: 91

## 2020-07-22 PROCEDURE — 99232 PR SUBSEQUENT HOSPITAL CARE,LEVL II: ICD-10-PCS | Mod: ,,, | Performed by: INTERNAL MEDICINE

## 2020-07-22 PROCEDURE — 82728 ASSAY OF FERRITIN: CPT

## 2020-07-22 PROCEDURE — 83735 ASSAY OF MAGNESIUM: CPT

## 2020-07-22 PROCEDURE — 86140 C-REACTIVE PROTEIN: CPT

## 2020-07-22 RX ORDER — CARVEDILOL 12.5 MG/1
12.5 TABLET ORAL 2 TIMES DAILY
Status: DISCONTINUED | OUTPATIENT
Start: 2020-07-22 | End: 2020-07-23

## 2020-07-22 RX ORDER — INSULIN ASPART 100 [IU]/ML
12 INJECTION, SOLUTION INTRAVENOUS; SUBCUTANEOUS
Status: DISCONTINUED | OUTPATIENT
Start: 2020-07-23 | End: 2020-07-22

## 2020-07-22 RX ORDER — INSULIN ASPART 100 [IU]/ML
10 INJECTION, SOLUTION INTRAVENOUS; SUBCUTANEOUS
Status: DISCONTINUED | OUTPATIENT
Start: 2020-07-22 | End: 2020-07-22

## 2020-07-22 RX ORDER — INSULIN ASPART 100 [IU]/ML
12 INJECTION, SOLUTION INTRAVENOUS; SUBCUTANEOUS
Status: DISCONTINUED | OUTPATIENT
Start: 2020-07-22 | End: 2020-07-24 | Stop reason: HOSPADM

## 2020-07-22 RX ADMIN — AMLODIPINE BESYLATE 10 MG: 5 TABLET ORAL at 08:07

## 2020-07-22 RX ADMIN — PIPERACILLIN AND TAZOBACTAM 4.5 G: 4; .5 INJECTION, POWDER, LYOPHILIZED, FOR SOLUTION INTRAVENOUS; PARENTERAL at 12:07

## 2020-07-22 RX ADMIN — HEPARIN SODIUM 5000 UNITS: 5000 INJECTION INTRAVENOUS; SUBCUTANEOUS at 06:07

## 2020-07-22 RX ADMIN — GABAPENTIN 300 MG: 300 CAPSULE ORAL at 10:07

## 2020-07-22 RX ADMIN — OXYCODONE HYDROCHLORIDE AND ACETAMINOPHEN 500 MG: 500 TABLET ORAL at 08:07

## 2020-07-22 RX ADMIN — Medication 10 ML: at 12:07

## 2020-07-22 RX ADMIN — INSULIN ASPART 5 UNITS: 100 INJECTION, SOLUTION INTRAVENOUS; SUBCUTANEOUS at 10:07

## 2020-07-22 RX ADMIN — INSULIN ASPART 8 UNITS: 100 INJECTION, SOLUTION INTRAVENOUS; SUBCUTANEOUS at 05:07

## 2020-07-22 RX ADMIN — ATORVASTATIN CALCIUM 40 MG: 40 TABLET, FILM COATED ORAL at 08:07

## 2020-07-22 RX ADMIN — DEXAMETHASONE 6 MG: 2 TABLET ORAL at 08:07

## 2020-07-22 RX ADMIN — INSULIN ASPART 8 UNITS: 100 INJECTION, SOLUTION INTRAVENOUS; SUBCUTANEOUS at 08:07

## 2020-07-22 RX ADMIN — COSYNTROPIN 0.25 MG: 0.25 INJECTION, POWDER, LYOPHILIZED, FOR SOLUTION INTRAVENOUS at 08:07

## 2020-07-22 RX ADMIN — GABAPENTIN 300 MG: 300 CAPSULE ORAL at 09:07

## 2020-07-22 RX ADMIN — THERA TABS 1 TABLET: TAB at 08:07

## 2020-07-22 RX ADMIN — INSULIN ASPART 8 UNITS: 100 INJECTION, SOLUTION INTRAVENOUS; SUBCUTANEOUS at 01:07

## 2020-07-22 RX ADMIN — HEPARIN SODIUM 5000 UNITS: 5000 INJECTION INTRAVENOUS; SUBCUTANEOUS at 10:07

## 2020-07-22 RX ADMIN — OXYCODONE HYDROCHLORIDE AND ACETAMINOPHEN 500 MG: 500 TABLET ORAL at 10:07

## 2020-07-22 RX ADMIN — Medication 10 ML: at 06:07

## 2020-07-22 RX ADMIN — PIPERACILLIN AND TAZOBACTAM 4.5 G: 4; .5 INJECTION, POWDER, LYOPHILIZED, FOR SOLUTION INTRAVENOUS; PARENTERAL at 05:07

## 2020-07-22 RX ADMIN — INSULIN ASPART 10 UNITS: 100 INJECTION, SOLUTION INTRAVENOUS; SUBCUTANEOUS at 12:07

## 2020-07-22 RX ADMIN — CARVEDILOL 12.5 MG: 12.5 TABLET, FILM COATED ORAL at 10:07

## 2020-07-22 RX ADMIN — PIPERACILLIN AND TAZOBACTAM 4.5 G: 4; .5 INJECTION, POWDER, LYOPHILIZED, FOR SOLUTION INTRAVENOUS; PARENTERAL at 08:07

## 2020-07-22 RX ADMIN — INSULIN ASPART 12 UNITS: 100 INJECTION, SOLUTION INTRAVENOUS; SUBCUTANEOUS at 05:07

## 2020-07-22 RX ADMIN — HEPARIN SODIUM 5000 UNITS: 5000 INJECTION INTRAVENOUS; SUBCUTANEOUS at 01:07

## 2020-07-22 NOTE — CONSULTS
Reason for consult : ? 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 He had failed OP treatment and was admitted for IV Ab x. He remained afebrile prior to arrival.   The work up showed hyponatremia with K level close to the upper limit of the normal range. He was started on dexamethasone 6 mg daily since 7/17/20 and was discontinued today . Received total of 6 doses. He had cosyntropin test done today soon after receiving dexamethasone dose.     Clinically he is no symptomatic. Denied nausea , abdominal pain , diarrhea ( had one episode today ) , weight loss , lightheadedness.    Patient states he drinks more than a gallon water at home.     Denied OP treatment with steroids.       Diabetes type 2 :  Treated with trulicity once a week. He does SMBG : few times a week.  Range in . Reports neuropathy to feet.   Diabetic foot ulcers in the past . No CAD or stroke.   No hypoglycemic events at home.   But since admission he is on levemir 20 units and Novolog 10 units tid.     Past Medical History:   Diagnosis Date    CKD (chronic kidney disease), stage III 07/16/2020    CKD stage 3 due to type 1 diabetes mellitus     CKD stage 3 due to type 2 diabetes mellitus     Diabetes mellitus, type 2     Diabetic foot ulcer associated with diabetes mellitus due to underlying condition 07/16/2020    Diabetic foot ulcers     Hyperlipidemia     Hypertension      Past Surgical History:   Procedure Laterality Date    BONE BIOPSY Left 7/17/2020    Procedure: BIOPSY, BONE;  Surgeon: Verona Whitmore DPM;  Location: [facility] OR;  Service: Podiatry;  Laterality: Left;    CERVICAL DISC SURGERY  07/11/2016    DEBRIDEMENT Left 7/17/2020    Procedure: DEBRIDEMENT;  Surgeon: Verona Whitmore DPM;  Location: [facility] OR;  Service: Podiatry;  Laterality: Left;    left leg orthopedic  surgery Left      Social History     Socioeconomic History    Marital status:      Spouse name: Not on file    Number of children: Not on file    Years of education: Not on file    Highest education level: Not on file   Occupational History    Not on file   Social Needs    Financial resource strain: Not on file    Food insecurity     Worry: Not on file     Inability: Not on file    Transportation needs     Medical: Not on file     Non-medical: Not on file   Tobacco Use    Smoking status: Never Smoker    Smokeless tobacco: Never Used   Substance and Sexual Activity    Alcohol use: Not Currently     Alcohol/week: 0.0 standard drinks     Comment: social    Drug use: No    Sexual activity: Not on file   Lifestyle    Physical activity     Days per week: Not on file     Minutes per session: Not on file    Stress: Not on file   Relationships    Social connections     Talks on phone: Not on file     Gets together: Not on file     Attends [employer] service: Not on file     Active member of club or organization: Not on file     Attends meetings of clubs or organizations: Not on file     Relationship status: Not on file   Other Topics Concern    Not on file   Social History Narrative    Not on file     Family History   Problem Relation Age of Onset    Heart disease Father      No current facility-administered medications on file prior to encounter.      Current Outpatient Medications on File Prior to Encounter   Medication Sig Dispense Refill    amLODIPine (NORVASC) 10 MG tablet Take 1 tablet (10 mg total) by mouth once daily. 90 tablet 3    atorvastatin (LIPITOR) 40 MG tablet Take 1 tablet (40 mg total) by mouth once daily. 90 tablet 3    blood sugar diagnostic Strp 1 strip by Misc.(Non-Drug; Combo Route) route 3 (three) times daily. Patient needs One Touch Test Strips (Berio). 100 strip 2    lisinopriL (PRINIVIL,ZESTRIL) 20 MG tablet TAKE 1 TABLET(20 MG) BY MOUTH EVERY DAY 90 tablet 0     TRULICITY 0.75 mg/0.5 mL PnIj INJECT 0.5 ML UNDER THE SKIN EVERY 7 DAYS 2 mL 5     Review of patient's allergies indicates:   Allergen Reactions    Morphine Hallucinations     Current Facility-Administered Medications   Medication Dose Route Frequency Provider Last Rate Last Dose    acetaminophen tablet 650 mg  650 mg Oral Q4H PRN Vikas Reyes MD   650 mg at 07/17/20 2122    albuterol inhaler 2 puff  2 puff Inhalation Q6H PRN Cande Aguilar MD        amLODIPine tablet 10 mg  10 mg Oral Daily Vikas Reyes MD   10 mg at 07/22/20 0831    ascorbic acid (vitamin C) tablet 500 mg  500 mg Oral BID Vikas Reyes MD   500 mg at 07/22/20 0831    atorvastatin tablet 40 mg  40 mg Oral Daily Vikas Reyes MD   40 mg at 07/22/20 0831    benzonatate capsule 100 mg  100 mg Oral TID PRN Vikas Reyes MD        carvediloL tablet 12.5 mg  12.5 mg Oral BID Cande Aguilar MD   12.5 mg at 07/22/20 1035    dextrose 50% injection 12.5 g  12.5 g Intravenous PRN Vikas Reyes MD        dextrose 50% injection 25 g  25 g Intravenous PRN Vikas Reyes MD        gabapentin capsule 300 mg  300 mg Oral BID Chaparrita Cooper NP   300 mg at 07/22/20 0900    glucagon (human recombinant) injection 1 mg  1 mg Intramuscular PRN Vikas Reyes MD        glucose chewable tablet 16 g  16 g Oral PRN Vikas Reyes MD        glucose chewable tablet 24 g  24 g Oral PRN Vikas Reyes MD        heparin (porcine) injection 5,000 Units  5,000 Units Subcutaneous Q8H Vikas Reyes MD   5,000 Units at 07/22/20 1302    hydrALAZINE injection 15 mg  15 mg Intravenous Q4H PRN Vikas Reyes MD        insulin aspart U-100 pen 1-10 Units  1-10 Units Subcutaneous QID (AC + HS) PRN Vikas Reyes MD   8 Units at 07/22/20 1300    insulin aspart U-100 pen 10 Units  10 Units Subcutaneous TIDWM Cande Aguilar MD   10 Units at 07/22/20 1259    insulin detemir U-100 pen 20 Units  20 Units Subcutaneous Daily Cande Aguilar  MD   20 Units at 07/22/20 1000    insulin detemir U-100 pen 20 Units  20 Units Subcutaneous QHS Cande Aguilar MD        multivitamin tablet  1 tablet Oral Daily Vikas Reyes MD   1 tablet at 07/22/20 0830    ondansetron disintegrating tablet 8 mg  8 mg Oral Q8H PRN Vikas Reyes MD        piperacillin-tazobactam 4.5 g in sodium chloride 0.9% 100 mL IVPB (ready to mix system)  4.5 g Intravenous Q8H Cande Aguilar MD 25 mL/hr at 07/22/20 0829 4.5 g at 07/22/20 0829    simethicone chewable tablet 80 mg  1 tablet Oral TID PRN Adrienne Kaye NP   80 mg at 07/19/20 2206    sodium chloride 0.9% flush 10 mL  10 mL Intravenous PRN Vikas Reyes MD        sodium chloride 0.9% flush 10 mL  10 mL Intravenous Q6H Vikas Reyes MD   10 mL at 07/22/20 1248    And    sodium chloride 0.9% flush 10 mL  10 mL Intravenous PRN Vikas Reyes MD   10 mL at 07/21/20 0842       Review of Systems    Constitutional: Negative for activity change, appetite change, fatigue and unexpected weight change.   HENT: Negative for congestion.   Eyes: Negative for discharge and visual disturbance.  Respiratory: Negative for cough, chest tightness and shortness of breath.    Cardiovascular: Negative for chest pain, palpitations and leg swelling.   Gastrointestinal: Negative for abdominal distention.   Endocrine: Negative for cold intolerance, heat intolerance, polydipsia, polyphagia and polyuria.   Genitourinary: Negative for frequency and decreased urine volume.  Neurological: Negative for dizziness, tremors, speech difficulty, weakness, light-headedness, ++ numbness and headaches.  Hematological: Negative for adenopathy.  Psychiatric/Behavioral: Negative for agitation.     Physical Exam     Vitals:    07/22/20 1122   BP: (!) 163/86   Pulse: 72   Resp: 20   Temp: 97.7 °F (36.5 °C)     Labs:  Results for JOMAR LEDESMA (MRN [id number]) as of 7/22/2020 15:49   Ref. Range 7/20/2020 05:53 7/22/2020 05:08   Sodium Latest Ref Range:  136 - 145 mmol/L 125 (L) 127 (L)   Potassium Latest Ref Range: 3.5 - 5.1 mmol/L 5.1 5.3 (H)   Chloride Latest Ref Range: 95 - 110 mmol/L 97 99   CO2 Latest Ref Range: 23 - 29 mmol/L 21 (L) 21 (L)   Anion Gap Latest Ref Range: 8 - 16 mmol/L 7 (L) 7 (L)   BUN, Bld Latest Ref Range: 6 - 20 mg/dL 82 (H) 94 (H)   Creatinine Latest Ref Range: 0.5 - 1.4 mg/dL 3.3 (H) 3.6 (H)   eGFR if non African American Latest Ref Range: >60 mL/min/1.73 m^2 20 (A) 18 (A)   eGFR if African American Latest Ref Range: >60 mL/min/1.73 m^2 23 (A) 21 (A)   Glucose Latest Ref Range: 70 - 110 mg/dL 359 (H) 396 (H)   Calcium Latest Ref Range: 8.7 - 10.5 mg/dL 8.2 (L) 8.2 (L)   Magnesium Latest Ref Range: 1.6 - 2.6 mg/dL 1.9 1.9   Alkaline Phosphatase Latest Ref Range: 55 - 135 U/L 314 (H) 310 (H)   PROTEIN TOTAL Latest Ref Range: 6.0 - 8.4 g/dL 6.7 6.4   Albumin Latest Ref Range: 3.5 - 5.2 g/dL 2.2 (L) 1.9 (L)   BILIRUBIN TOTAL Latest Ref Range: 0.1 - 1.0 mg/dL 0.2 0.2   AST Latest Ref Range: 10 - 40 U/L 33 27   ALT Latest Ref Range: 10 - 44 U/L 34 37   CRP Latest Ref Range: 0.0 - 8.2 mg/L 43.9 (H) 58.7 (H)     Results for LEDESMAJOMAR (MRN [id number]) as of 7/22/2020 15:49   Ref. Range 7/20/2020 05:53 7/22/2020 05:08 7/22/2020 08:58   Cortisol Latest Units: ug/dL 1.50 <1.00 <1.00       Assessment and Plan :     1. Low cortisol level :   - Clinically patient had no symptoms of adrenal Insufficiency before or after current hospital admission.   - Pt did receive Dexamethasone 6 mg for 6 days . Since Dexamethasone is a long acting insulin and also since he received  a strong dose for past 6 days ,seems there is suppression of the HPA axis.   - I would hold dexamethasone for 2 days and then repeat Cosyntropin test if needed.   - Patient drinks more than 1 gallon of water a day . So hyponatremia can be secondary to fluid overload.   - Serum osmolality close to the lower limit of the normal range.     2. Hyponatremia :   Plan as states above.     3.  Diabetes type 2 : Uncontrolled  - Due to steroid use.   - Increase levemir to 25 units at night   - Continue Novolog 12 units before meals  - Cont accu check q ac and qhs.   -On Abx for osteomyelitis.     Thank you for involving me in the care of this patient.        Tara Rosario M.D.  Endocrinology     [address], [address]  [place] [address]  [phone] ([employer])  [phone] (Fax

## 2020-07-22 NOTE — ASSESSMENT & PLAN NOTE
-BP was normal on admit.  Mildly elevated today.  -At home takes norvasc, diltiazem, lisinopril and chlorthalidone  -Odd to take two calcium channel blockers. DC diltiazem at discharge. Will also DC chlorthalidone at discharge  -Will continue norvasc  - hold lisinopril for hyperK-- may need to revisit if he can take this Rx at DC  -Continue prn hydralazine.

## 2020-07-22 NOTE — PROGRESS NOTES
[facility] Hospital Medicine  Progress Note    Patient Name: Catalino Mcfadden  MRN: [id number]  Patient Class: IP- Inpatient   Admission Date: 7/16/2020  Length of Stay: 6 days  Attending Physician: Cande Aguilar MD  Primary Care Provider: Azikiwe K Lombard, MD        Subjective:     Principal Problem:Acute osteomyelitis of metatarsal bone of left foot        HPI:  Mr. Mcfadden is a 55 year old man with history of diabetes mellitus, diabetic foot ulcers, hypertension, hyperlipidemia and CKDIII who was found to have osteomyelitis on outside imaging and sent in by his podiatrist, Dr. Martinez.  He states that he has had ulcers on his left foot for quite some time and they had been stable.  Then 2.5 weeks ago he noted a blister on his left foot which subsequently popped.  After discussing with his podiatrist he was started on cipro.  At the time, he was in [place], [place] because his father was ill and in the hospital.  His father passed away with complications from covid-19 infection.  Because he was out of town attending to his father's illness, he was unable to follow up with Dr. Martinez until today.  Xray of his foot was obtained and showed osteomyelitis of his left 5th toe.  He noted a malodorous drainage and a mild achey pain.  He denied fevers, chills, nausea, vomiting, diarrhea, lethargy, malaise, shortness of breath or cough.  He notes he drinks at least a gallon of water daily.    Overview/Hospital Course:  55 year old man with history of diabetes mellitus, diabetic foot ulcers, hypertension, hyperlipidemia and CKDIII who was found to have osteomyelitis on outside imaging and sent in by his podiatrist, Dr. Martinez.  He has had bone biopsy/culture and debridement by podiatry.  Wound culture 7/16 growing Prevotella and Myroides; wound culture 7/17 growing Peptostreptococcus. Bone culture 7/17 with Peptostreptococcus.  ID consulted. PICC already in place for outpatient antibiotics. CT foot 7/21 shows  osteomyelitis and soft tissue ulceration but no abscess. On zosyn.     Also, he has covid - but is completely asymptomatic.  O2 sats got to 94, so he was started on dexamethasone. He has remained off supplemental O2, so dexamethasone was discontinued.     Has mild hyponatremia on fluid restriction (drinks at least a gallon of water daily at home).  Serum cortisol abnormally low at 1.5 on admit. Endocrine consulted. His mild acute/chronic renal failure is at baseline.     Interval History: Slipped to the floor earlier this afternoon. No pain, no complaints.     Review of Systems   Constitutional: Negative for chills and fever.   Respiratory: Negative for cough and shortness of breath.    Cardiovascular: Negative for chest pain.   Gastrointestinal: Negative for abdominal pain.   Genitourinary: Negative for difficulty urinating.     Objective:     Vital Signs (Most Recent):  Temp: 97.7 °F (36.5 °C) (07/22/20 1122)  Pulse: 72 (07/22/20 1122)  Resp: 20 (07/22/20 1122)  BP: (!) 163/86 (07/22/20 1122)  SpO2: 95 % (07/22/20 1122) Vital Signs (24h Range):  Temp:  [97.7 °F (36.5 °C)-97.9 °F (36.6 °C)] 97.7 °F (36.5 °C)  Pulse:  [72-85] 72  Resp:  [18-20] 20  SpO2:  [95 %-96 %] 95 %  BP: (149-163)/(80-91) 163/86     Weight: 112 kg (246 lb 14.6 oz)  Body mass index is 32.58 kg/m².    Intake/Output Summary (Last 24 hours) at 7/22/2020 1423  Last data filed at 7/22/2020 0900  Gross per 24 hour   Intake 700 ml   Output 1402 ml   Net -702 ml      Physical Exam  Vitals signs and nursing note reviewed.   Constitutional:       General: He is not in acute distress.     Appearance: He is obese. He is not toxic-appearing.   HENT:      Head: Normocephalic and atraumatic.   Cardiovascular:      Rate and Rhythm: Normal rate.      Pulses: Normal pulses.      Heart sounds: Normal heart sounds. No murmur. No gallop.    Pulmonary:      Effort: Pulmonary effort is normal. No respiratory distress.      Breath sounds: Normal breath sounds. No  wheezing or rales.      Comments: Room air  Abdominal:      General: Abdomen is flat. Bowel sounds are normal. There is no distension.      Tenderness: There is no abdominal tenderness. There is no guarding.   Musculoskeletal:         General: No swelling.      Comments: L foot bandaged and in boot, not removed   Skin:     General: Skin is warm and dry.      Comments: R arm PICC in place   Neurological:      Mental Status: He is alert.         Significant Labs: All pertinent labs within the past 24 hours have been reviewed.    Significant Imaging: I have reviewed and interpreted all pertinent imaging results/findings within the past 24 hours.      Assessment/Plan:      * Acute osteomyelitis of metatarsal bone of left foot  -He had a diabetic foot wound that has failed outpatient treatment with cipro and radiographic evidence of osteomyelitis  -On admit he was afebrile with normal wbc and CRP elevated at 233.  -Doppler US with BLANK obtained.  Vascular surgery consulted and state he has adequate perfusion to heal his wounds.  No intervention needed at this time.  -Podiatry consulted and took patient for debridement and bone biopsy 7/18.  - Wound cultures/bone culture Myroides and Peptostreptococcus. ID consulted. On zosyn x6 weeks   - Podiatry planning wound vac at [place]    Hyperkalemia  Hold lisinopril      Acute renal failure superimposed on stage 3 chronic kidney disease  -Baseline Cr is 2.8-3.1 and on admit Cr is mildly above baseline at 3.8.  -Could be due to mild diminished appetite vs infection.  -Renally dose medications and avoid nephrotoxic agents  - Improving  - hold lisinopril    Hyponatremia  -Na was 128 on admit and he had no symptoms.  -On admit he appeared euvolemic and Usom 370, Ada 32 and Sosm 289.  - Discontinue chlorthalidone  - TSH low, FT4 normal  - random cortisol low at 1.5-- cortisol stim test ordered for 7/22 AM. Endocrine consulted    COVID-19 virus infection  -His father just passed away from  covid infection and he has tested positive in the ER  -He remains afebrile without any symptoms at this time.  O2 sats dropped to 94% once time.  Not requiring supplemental O2.  -Continue covid isolation  -Continue vitamin C and multivitamin  -O2 sats dropped to 94 and was started on dexamethasone. He has not been hypoxic since and has no O2 requirement. He is developing difficult to control hyperglycemia due to dexamethasone. Dexamethasone discontinued on 7/22 after 6 doses as he remains stable on room air with no symptoms of COVID     Diabetic ulcer of toe of left foot  -Treatment as above    Type 2 diabetes mellitus with foot ulcer, without long-term current use of insulin  -A1c 8.1  -At home takes trulicity weekly.  Hold this while inpatient.  -Continue detemir 20 BID and aspart 10 QAC  -Continue moderate dose ISS.  -Continue diabetic diet     Hyperlipidemia, acquired  -Continue home statin      Essential hypertension  -BP was normal on admit.  Mildly elevated today.  -At home takes norvasc, diltiazem, lisinopril and chlorthalidone  -Odd to take two calcium channel blockers. DC diltiazem at discharge. Will also DC chlorthalidone at discharge  -Will continue norvasc  - hold lisinopril for hyperK-- may need to revisit if he can take this Rx at [place]  -Continue prn hydralazine.        VTE Risk Mitigation (From admission, onward)         Ordered     heparin (porcine) injection 5,000 Units  Every 8 hours      07/16/20 1727     IP VTE HIGH RISK PATIENT  Once      07/16/20 1727     Place sequential compression device  Until discontinued      07/16/20 1727                      Cande Aguilar MD  Department of Hospital Medicine   [facility]

## 2020-07-22 NOTE — PLAN OF CARE
Pt alert able to make needs known,mica meds well,IV abx remains in progress,no s/s adverse reaction noted,reposition self q 2hrs,pain controlled by prn pain medication,unwitnessed fall this shift no injuries noted ,safety maintained,continue monitoring.     Problem: Fall Injury Risk  Goal: Absence of Fall and Fall-Related Injury  Outcome: Ongoing, Progressing  Intervention: Identify and Manage Contributors to Fall Injury Risk  Flowsheets (Taken 7/22/2020 1540)  Self-Care Promotion:   independence encouraged   BADL personal objects within reach   meal setup provided  Medication Review/Management:   medications reviewed   high risk medications identified     Problem: Adult Inpatient Plan of Care  Goal: Plan of Care Review  Outcome: Ongoing, Progressing  Flowsheets (Taken 7/22/2020 1540)  Plan of Care Reviewed With: patient  Goal: Patient-Specific Goal (Individualization)  Outcome: Ongoing, Progressing  Goal: Absence of Hospital-Acquired Illness or Injury  Outcome: Ongoing, Progressing  Intervention: Identify and Manage Fall Risk  Flowsheets (Taken 7/22/2020 1540)  Safety Promotion/Fall Prevention:   assistive device/personal item within reach   bed alarm set   high risk medications identified   Fall Risk signage in place   Fall Risk reviewed with patient/family   nonskid shoes/socks when out of bed   side rails raised x 2   instructed to call staff for mobility   room near unit station  Goal: Optimal Comfort and Wellbeing  Outcome: Ongoing, Progressing  Goal: Readiness for Transition of Care  Outcome: Ongoing, Progressing  Goal: Rounds/Family Conference  Outcome: Ongoing, Progressing     Problem: Diabetes Comorbidity  Goal: Blood Glucose Level Within Desired Range  Outcome: Ongoing, Progressing     Problem: Electrolyte Imbalance (Acute Kidney Injury/Impairment)  Goal: Serum Electrolyte Balance  Outcome: Ongoing, Progressing     Problem: Fluid Imbalance (Acute Kidney Injury/Impairment)  Goal: Optimal Fluid  Balance  Outcome: Ongoing, Progressing     Problem: Hematologic Alteration (Acute Kidney Injury/Impairment)  Goal: Hemoglobin, Hematocrit and Platelets Within Normal Range  Outcome: Ongoing, Progressing     Problem: Oral Intake Inadequate (Acute Kidney Injury/Impairment)  Goal: Optimal Nutrition Intake  Outcome: Ongoing, Progressing     Problem: Renal Function Impairment (Acute Kidney Injury/Impairment)  Goal: Effective Renal Function  Outcome: Ongoing, Progressing     Problem: Infection  Goal: Infection Symptom Resolution  Outcome: Ongoing, Progressing  Intervention: Prevent or Manage Infection  Flowsheets (Taken 7/22/2020 1540)  Infection Management: aseptic technique maintained  Isolation Precautions:   contact precautions maintained   droplet precautions maintained   airborne precautions maintained     Problem: Skin Injury Risk Increased  Goal: Skin Health and Integrity  Outcome: Ongoing, Progressing  Intervention: Optimize Skin Protection  Flowsheets (Taken 7/22/2020 1540)  Pressure Reduction Techniques: frequent weight shift encouraged  Skin Protection:   tubing/devices free from skin contact   adhesive use limited  Head of Bed (HOB): HOB at 30-45 degrees

## 2020-07-22 NOTE — CONSULTS
[facility] Hospital Medicine  Telemedicine Consult Note    Catalino Mcfadden will not be accepted to the [facility] medicine at this time due to trends in his blood work on his CBC and CMP. Please re-consult once trend improves.     Christelle Jackson MD  Department of Hospital Medicine   [facility]

## 2020-07-22 NOTE — ASSESSMENT & PLAN NOTE
-A1c 8.1  -At home takes trulicity weekly.  Hold this while inpatient.  -Continue detemir 20 BID and aspart 10 QAC  -Continue moderate dose ISS.  -Continue diabetic diet

## 2020-07-22 NOTE — SUBJECTIVE & OBJECTIVE
Interval History: Slipped to the floor earlier this afternoon. No pain, no complaints.     Review of Systems   Constitutional: Negative for chills and fever.   Respiratory: Negative for cough and shortness of breath.    Cardiovascular: Negative for chest pain.   Gastrointestinal: Negative for abdominal pain.   Genitourinary: Negative for difficulty urinating.     Objective:     Vital Signs (Most Recent):  Temp: 97.7 °F (36.5 °C) (07/22/20 1122)  Pulse: 72 (07/22/20 1122)  Resp: 20 (07/22/20 1122)  BP: (!) 163/86 (07/22/20 1122)  SpO2: 95 % (07/22/20 1122) Vital Signs (24h Range):  Temp:  [97.7 °F (36.5 °C)-97.9 °F (36.6 °C)] 97.7 °F (36.5 °C)  Pulse:  [72-85] 72  Resp:  [18-20] 20  SpO2:  [95 %-96 %] 95 %  BP: (149-163)/(80-91) 163/86     Weight: 112 kg (246 lb 14.6 oz)  Body mass index is 32.58 kg/m².    Intake/Output Summary (Last 24 hours) at 7/22/2020 1423  Last data filed at 7/22/2020 0900  Gross per 24 hour   Intake 700 ml   Output 1402 ml   Net -702 ml      Physical Exam  Vitals signs and nursing note reviewed.   Constitutional:       General: He is not in acute distress.     Appearance: He is obese. He is not toxic-appearing.   HENT:      Head: Normocephalic and atraumatic.   Cardiovascular:      Rate and Rhythm: Normal rate.      Pulses: Normal pulses.      Heart sounds: Normal heart sounds. No murmur. No gallop.    Pulmonary:      Effort: Pulmonary effort is normal. No respiratory distress.      Breath sounds: Normal breath sounds. No wheezing or rales.      Comments: Room air  Abdominal:      General: Abdomen is flat. Bowel sounds are normal. There is no distension.      Tenderness: There is no abdominal tenderness. There is no guarding.   Musculoskeletal:         General: No swelling.      Comments: L foot bandaged and in boot, not removed   Skin:     General: Skin is warm and dry.      Comments: R arm PICC in place   Neurological:      Mental Status: He is alert.         Significant Labs: All pertinent  labs within the past 24 hours have been reviewed.    Significant Imaging: I have reviewed and interpreted all pertinent imaging results/findings within the past 24 hours.

## 2020-07-22 NOTE — PROGRESS NOTES
[facility]  Infectious Disease  Progress Note    Patient Name: Catalino Mcfadden  MRN: [id number]  Admission Date: 7/16/2020  Length of Stay: 6 days  Attending Physician: Cande Aguilar MD  Primary Care Provider: Azikiwe K Lombard, MD    Isolation Status: Airborne and Contact and Droplet  Assessment/Plan:      * Acute osteomyelitis of metatarsal bone of left foot  Osteomyelitis of 5th metatarsal presumed to be acute while awaiting biopsy results. Wound cultures with Myroides and Peptostreptococcus. Preliminary bone cultures with Peptostreptococcus and S viridans. Leukocytosis likely a leukemoid reaction to dexamethasone.  CT without drainable fluid collections.   · Agree with Zosyn.   · Consider nephrology evaluation.  · WAncipitate need for  6 weeks of IV antibiotics if no further debridement needed. EOC:  9/1/2020  · Will repeat ESR and CRP tomorrow.  · Will need weekly CBC, CMP, ESR and CRP.   · Please fax results to [phone] care of Dr. Resendiz.   ·  ID Clinic follow up on 8/12/20 at 9 am.     COVID-19 virus infection  Currently not requiring oxygen. On dexamethasone.   · Defer management to primary service.       Anticipated Disposition: home with home health    Thank you for your consult. I will follow peripherally. Call if questions arise.     Pat Resendiz MD  Infectious Disease  [facility]    Subjective:     Principal Problem:Acute osteomyelitis of metatarsal bone of left foot    HPI: A 55-year-old man with HTN, DM, CKD 3, and diabetic foot ulcer who had worsening left foot ulcer. He had been previously treated with Cipro as he left [facility] to help care for his father who had SARS-CoV 2 infection. After returning he underwent diagnostic imaging which showed changes consistent with osteomyelitis. He was admitted to [facility] and tested for SARS-CoV 2 which was subsequently positive. He was evaluated by podiatry and underwent debridement with bone biopsy. Initial  "wound cultures showed Myroides spp and Peptostreptococcus anaerobius. Bone cultures from 7/17 with Peptostreptococcus and aerobic cultures still in process.     Mr. Mcfadden has NKDA. He does not have pets. Denies toxic habits.     Infectious Diseases consulted for antibiotic management recommendations.         Interval History: "OK". SARS-CoV 2 infection and 5th metatarsal osteomyelitis due to Myroides and Peptostreptococcus. S/P I&D. Antibiotic therapy optimized to Zosyn monotherapy on 7/21 based on culture results. Leukocytosis noted suspected to be leukemoid reaction to steroids. CT of the foot without drainable fluid collections. Bone cultures now with S viridans.     Review of Systems   Constitutional: Negative for chills, fatigue and fever.   HENT: Negative for ear pain, mouth sores, nosebleeds, postnasal drip, rhinorrhea, sinus pressure, sore throat, tinnitus, trouble swallowing and voice change.    Eyes: Negative for photophobia, pain, redness and visual disturbance.   Respiratory: Negative for apnea, cough, chest tightness, shortness of breath and wheezing.    Cardiovascular: Negative for chest pain, palpitations and leg swelling.   Gastrointestinal: Negative for abdominal pain, blood in stool, constipation, diarrhea, nausea and vomiting.   Endocrine: Negative for cold intolerance, heat intolerance, polydipsia and polyuria.   Genitourinary: Negative for decreased urine volume, difficulty urinating, discharge, dysuria, flank pain, frequency, genital sores, hematuria, penile pain, penile swelling, scrotal swelling, testicular pain and urgency.   Musculoskeletal: Negative for arthralgias, back pain, joint swelling, myalgias and neck pain.   Skin: Positive for wound. Negative for color change and rash.   Allergic/Immunologic: Negative for environmental allergies and food allergies.   Neurological: Negative for dizziness, seizures, syncope, weakness, light-headedness, numbness and headaches.   Hematological: " Negative for adenopathy. Does not bruise/bleed easily.   Psychiatric/Behavioral: Negative for agitation, confusion, decreased concentration, hallucinations, self-injury, sleep disturbance and suicidal ideas. The patient is not nervous/anxious.      Objective:     Vital Signs (Most Recent):  Temp: 97.8 °F (36.6 °C) (07/22/20 0741)  Pulse: 85 (07/22/20 0741)  Resp: 20 (07/22/20 0741)  BP: (!) 161/91 (07/22/20 0741)  SpO2: 96 % (07/22/20 0741) Vital Signs (24h Range):  Temp:  [97.8 °F (36.6 °C)-98.2 °F (36.8 °C)] 97.8 °F (36.6 °C)  Pulse:  [72-85] 85  Resp:  [18-20] 20  SpO2:  [95 %-97 %] 96 %  BP: (149-171)/(80-91) 161/91     Weight: 112 kg (246 lb 14.6 oz)  Body mass index is 32.58 kg/m².    Estimated Creatinine Clearance: 30 mL/min (A) (based on SCr of 3.6 mg/dL (H)).    Physical Exam  Vitals signs reviewed.   Constitutional:       Appearance: He is well-developed.   HENT:      Head: Normocephalic and atraumatic.      Right Ear: External ear normal.      Left Ear: External ear normal.   Eyes:      General: No scleral icterus.        Right eye: No discharge.         Left eye: No discharge.      Conjunctiva/sclera: Conjunctivae normal.   Neck:      Musculoskeletal: Normal range of motion and neck supple.      Thyroid: No thyromegaly.   Cardiovascular:      Rate and Rhythm: Normal rate and regular rhythm.      Heart sounds: Normal heart sounds. No murmur.   Pulmonary:      Effort: Pulmonary effort is normal.      Breath sounds: Normal breath sounds. No wheezing or rales.   Abdominal:      General: Bowel sounds are normal.      Palpations: Abdomen is soft. There is no mass.      Tenderness: There is no abdominal tenderness. There is no rebound.   Musculoskeletal: Normal range of motion.      Comments: Left foot wrapped with gauze dressing.    Lymphadenopathy:      Cervical: No cervical adenopathy.   Skin:     General: Skin is warm and dry.      Comments: Tattoo's.    Neurological:      Mental Status: He is alert and  oriented to person, place, and time.   Psychiatric:         Behavior: Behavior normal.         Significant Labs:   Blood Culture:   Recent Labs   Lab 07/16/20  1717 07/16/20  1722   LABBLOO No growth after 5 days. No growth after 5 days.     BMP:   Recent Labs   Lab 07/22/20  0508   *   *   K 5.3*   CL 99   CO2 21*   BUN 94*   CREATININE 3.6*   CALCIUM 8.2*   MG 1.9     CBC:   Recent Labs   Lab 07/21/20  0430 07/22/20  0508   WBC 18.59* 17.82*   HGB 10.9* 10.9*   HCT 33.0* 33.7*    278     Wound Culture:   Recent Labs   Lab 04/20/20  1107 07/16/20  1129 07/17/20  1243 07/17/20  1258   LABAERO PSEUDOMONAS AERUGINOSA  Few  No other significant isolate  * MYROIDES SPECIES  Moderate  Skin josefa also present  * No significant isolate VIRIDANS STREPTOCOCCUS GROUP  Moderate  *       Significant Imaging: I have reviewed all pertinent imaging results/findings within the past 24 hours.

## 2020-07-22 NOTE — ASSESSMENT & PLAN NOTE
-Baseline Cr is 2.8-3.1 and on admit Cr is mildly above baseline at 3.8.  -Could be due to mild diminished appetite vs infection.  -Renally dose medications and avoid nephrotoxic agents  - Improving  - hold lisinopril

## 2020-07-22 NOTE — ASSESSMENT & PLAN NOTE
-He had a diabetic foot wound that has failed outpatient treatment with cipro and radiographic evidence of osteomyelitis  -On admit he was afebrile with normal wbc and CRP elevated at 233.  -Doppler US with BLANK obtained.  Vascular surgery consulted and state he has adequate perfusion to heal his wounds.  No intervention needed at this time.  -Podiatry consulted and took patient for debridement and bone biopsy 7/18.  - Wound cultures/bone culture Myroides and Peptostreptococcus. ID consulted. On zosyn x6 weeks   - Podiatry planning wound vac at [place]

## 2020-07-22 NOTE — PROGRESS NOTES
[facility]  Podiatry  Progress Note    Patient Name: Catalino Mcfadden  MRN: [id number]  Admission Date: 7/16/2020  Hospital Length of Stay: 6 days  Attending Physician: Cande Aguilar MD  Primary Care Provider: Azikiwe K Lombard, MD     Subjective:     History of Present Illness: 56 y/o male PMH DM2, CKD admitted for left foot infection. Reports left foot ulceration x 3 weeks, thinks this started due to rubbing of brace on the lateral foot. Pt. Followed by Dr. Martinez per chart review contacted the office on 6/29/20 stating that he was heading out of town to [place] to check on his father. Rx. Cipro then sent to pharmacy which patient reports taking. He returned to [place] yesterday and was seen by . Xray left foot ordered revealing OM, gas forming infection. Sent to ED for admission. Denies N/V/F. Reports some chills.     7/18/20: S/p one day left foot debridement, bone biopsy. Denies pedal pain. CAM boot intact left foot.     7/19/20: S/p 2 days left foot debridement, bone biopsy. CAM boot in place left foot.     7/20/20: Patient seen bedside s/p 3 days left foot debridement. CAM boot intact.     07/21/2020 Patient seen bedside s/p 4 days left foot debridement. CAM boot intact.     7/22/20: Patient seen bedside s/p 5 days left foot debridement. CAM boot intact. CT negative for abscess. Reports bandages were not changed yesterday evening. Reports receiving bandage change once yesterday. Current wound care orders are BID dressing change.     Scheduled Meds:   amLODIPine  10 mg Oral Daily    ascorbic acid (vitamin C)  500 mg Oral BID    atorvastatin  40 mg Oral Daily    carvediloL  12.5 mg Oral BID    dexAMETHasone  6 mg Oral Daily    gabapentin  300 mg Oral BID    heparin (porcine)  5,000 Units Subcutaneous Q8H    insulin aspart U-100  10 Units Subcutaneous TIDWM    insulin detemir U-100  20 Units Subcutaneous Daily    insulin detemir U-100  20 Units Subcutaneous QHS    multivitamin  1  tablet Oral Daily    piperacillin-tazobactam (ZOSYN) IVPB  4.5 g Intravenous Q8H    sodium chloride 0.9%  10 mL Intravenous Q6H     Continuous Infusions:    PRN Meds:acetaminophen, albuterol **AND** MDI QID, benzonatate, dextrose 50%, dextrose 50%, glucagon (human recombinant), glucose, glucose, hydrALAZINE, insulin aspart U-100, ondansetron, simethicone, sodium chloride 0.9%, [place] PICC Protocol **AND** sodium chloride 0.9% **AND** sodium chloride 0.9%    Review of patient's allergies indicates:   Allergen Reactions    Morphine Hallucinations        Past Medical History:   Diagnosis Date    CKD (chronic kidney disease), stage III 07/16/2020    CKD stage 3 due to type 1 diabetes mellitus     CKD stage 3 due to type 2 diabetes mellitus     Diabetes mellitus, type 2     Diabetic foot ulcer associated with diabetes mellitus due to underlying condition 07/16/2020    Diabetic foot ulcers     Hyperlipidemia     Hypertension      Past Surgical History:   Procedure Laterality Date    BONE BIOPSY Left 7/17/2020    Procedure: BIOPSY, BONE;  Surgeon: Verona Whitmore DPM;  Location: [facility] OR;  Service: Podiatry;  Laterality: Left;    CERVICAL DISC SURGERY  07/11/2016    DEBRIDEMENT Left 7/17/2020    Procedure: DEBRIDEMENT;  Surgeon: Verona Whitmore DPM;  Location: [facility] OR;  Service: Podiatry;  Laterality: Left;    left leg orthopedic surgery Left        Family History     Problem Relation (Age of Onset)    Heart disease Father        Tobacco Use    Smoking status: Never Smoker    Smokeless tobacco: Never Used   Substance and Sexual Activity    Alcohol use: Not Currently     Alcohol/week: 0.0 standard drinks     Comment: social    Drug use: No    Sexual activity: Not on file     Review of Systems   Constitutional: Positive for activity change.   Respiratory: Negative for shortness of breath.    Cardiovascular: Negative for chest pain and leg swelling.   Gastrointestinal: Negative for nausea and vomiting.    Musculoskeletal: Positive for arthralgias.   Neurological: Positive for numbness. Negative for weakness.     Objective:     Vital Signs (Most Recent):  Temp: 97.7 °F (36.5 °C) (07/22/20 1122)  Pulse: 72 (07/22/20 1122)  Resp: 20 (07/22/20 1122)  BP: (!) 163/86 (07/22/20 1122)  SpO2: 95 % (07/22/20 1122) Vital Signs (24h Range):  Temp:  [97.7 °F (36.5 °C)-97.9 °F (36.6 °C)] 97.7 °F (36.5 °C)  Pulse:  [72-85] 72  Resp:  [18-20] 20  SpO2:  [95 %-96 %] 95 %  BP: (149-163)/(80-91) 163/86     Weight: 112 kg (246 lb 14.6 oz)  Body mass index is 32.58 kg/m².    Foot Exam    General  Orientation: alert and oriented to person, place, and time       Right Foot/Ankle     Inspection and Palpation  Tenderness: none   Swelling: none   Skin Exam: no ulcer     Neurovascular  Dorsalis pedis: 1+  Posterior tibial: 1+      Left Foot/Ankle      Inspection and Palpation  Tenderness: metatarsals   Swelling: none   Skin Exam: ulcer;     Neurovascular  Dorsalis pedis: 1+  Posterior tibial: 1+          7/22/20:  Sutures intact skin edges well coapted.   Wound 1: Left plantar foot ulceration   Measurement: 9ede6gqx0.8cm  Base: fibrogranular base and bone  Periwound skin: HPK  Drainage: serous  Probe: probes to bone.             07/21/2020    Sutures intact skin edges well coapted.   Wound 1: Left plantar foot ulceration   Measurement: 7obo5hja9.8cm  Base: fibrogranular base and bone  Periwound skin: HPK  Drainage: serous  Probe: probes to bone.           7/20/20:    Pre debridement       Post debridement.             7/19/20:    Sutures intact skin edges well coapted.   Wound 1: Left plantar foot ulceration   Measurement: [id number]  Base: fibrogranular base   Periwound skin: HPK  Drainage: none  Probe: probes to bone.   No purulent drainage noted.           7/18/20:  Wound 1: Left plantar foot ulceration   Measurement: [id number]  Base: fibrogranular base   Periwound skin: HPK  Drainage: none  Probe: probes to bone.   No purulent  drainage noted.                   7/17/20:  Ulcer location: Left  lateral 5th metatarsal base   Signs of infection: +malodor, local edema and erythema  Drainage: Sero-Sanguinous  Purulence: no  Crepitus/fluctuance: no  Periwound: Reddened, Macerated, Calloused  Base: Fibrotic slough  Depth: muscle  Probe to bone: yes                Laboratory:  CBC:   Recent Labs   Lab 07/22/20  0508   WBC 17.82*   RBC 4.17*   HGB 10.9*   HCT 33.7*      MCV 81*   MCH 26.1*   MCHC 32.3     CMP:   Recent Labs   Lab 07/22/20  0508   *   CALCIUM 8.2*   ALBUMIN 1.9*   PROT 6.4   *   K 5.3*   CO2 21*   CL 99   BUN 94*   CREATININE 3.6*   ALKPHOS 310*   ALT 37   AST 27   BILITOT 0.2       Diagnostic Results:  Xray: X-Ray Foot Complete Left  Order: [id number]  Status:  Final result   Visible to patient:  No (not released) Next appt:  07/23/2020 at 10:15 AM in Podiatry (Aviva Martinez DPM) Dx:  Type II diabetes mellitus with neurol...  Details    Reading Physician Reading Date Result Priority   Segundo Brewster Jr., MD  [phone] 7/16/2020 Routine      Narrative & Impression     EXAMINATION:  XR FOOT COMPLETE 3 VIEW LEFT     CLINICAL HISTORY:  wound to lateral base of 5th met; Type 2 diabetes mellitus with other diabetic neurological complication     TECHNIQUE:  XR FOOT COMPLETE 3 VIEW LEFT     COMPARISON:  None     FINDINGS:  There is soft tissue swelling of the midfoot and forefoot with soft tissue ulceration and soft tissue gas adjacent to the base of the 5th metatarsal bone.  There is abnormal sclerosis of the base of the 5th metatarsal shaft which is suspicious for chronic osteomyelitis in light of the adjacent soft tissue ulcer.  Throughout the midfoot there are vague areas of cystic change, there is joint disorganization and loss of normal anatomy suggestive of Charcot arthropathy.  Superimposed septic arthritis and osteomyelitis difficult to exclude with certainty.  There is severe hallux valgus deformity with  areas of heterotopic ossification involving the plantar soft tissues of the foot.  Dorsal and plantar calcaneal spurs are present and there are remote fracture deformities of the distal fibula and posterior malleolus.     Impression:     Lateral midfoot soft tissue ulcer and suspected osteomyelitis of the 5th metatarsal bone.     Abnormalities throughout the midfoot which may reflect Charcot arthropathy and or septic arthritis/osteomyelitis     Posttraumatic changes of the distal tibia and fibula and arthritic changes of the 1st MTP joint.                 US Lower Extrem Arteries Bilat with BLANK  Order: [id number]  Status:  Final result   Visible to patient:  No (not released) Next appt:  07/23/2020 at 10:15 AM in Podiatry (Aviva Martinez DPM)  Details    Reading Physician Reading Date Result Priority   Porsha Mcgowan MD  [phone] 7/16/2020 STAT      Narrative & Impression     EXAMINATION:  US ARTERIAL LOWER EXTREMITY BILAT WITH BLANK (XPD)     CLINICAL HISTORY:  Eval for PVD;     TECHNIQUE:  Bilateral ankle-brachial indices as well as bilateral spectral, color and grayscale images of the large arteries of both lower extremities were performed.     COMPARISON:  None     FINDINGS:  ABIs are 0.9 on the right and 0.9 on the left.     Right lower extremity:     Common femoral artery: 96 cm/sec, triphasic     SFA proximal: 76 cm/sec, triphasic     SFA mid: 74 cm/sec, triphasic     SFA distal: 235 cm/sec, triphasic     Popliteal artery: 95 cm/sec, triphasic     Posterior tibial artery: 70 cm/sec     Anterior tibial artery: 46 cm/sec     Peroneal artery: 28 cm/sec     Left lower extremity:     Common femoral artery: 134 cm/sec, triphasic     SFA proximal: 111 cm/sec, triphasic     SFA mid: 98 cm/sec, triphasic     SFA distal: 116 cm/sec, triphasic     Popliteal artery: 93 cm/sec, biphasic     Posterior tibial artery: 106 cm/sec     Anterior tibial artery: 44 cm/sec     Peroneal artery: 78 cm/sec     Impression:     1.  ABIs measure 0.9 bilaterally.  2. Greater than doubling of velocities seen between the mid to distal right SFA which may indicate hemodynamically significant stenosis, although normal triphasic waveforms are maintained.  3. Otherwise no additional high-grade stenosis or occlusion seen involving the bilateral lower extremity arteries.              CT: Contains abnormal data CT Foot Without Contrast Left  Order: [id number]  Status:  Final result   Visible to patient:  Yes (Patient Portal and MyChart Bedside) Next appt:  07/30/2020 at 11:20 AM in Family Medicine (Azikiwe K Lombard, MD) Dx:  Other acute osteomyelitis, unspecifie...  Details    Reading Physician Reading Date Result Priority   Angel Gr MD  [phone]  [phone] 7/21/2020 STAT      Narrative & Impression     EXAMINATION:  CT FOOT WITHOUT CONTRAST LEFT     CLINICAL HISTORY:  Osteomyelitis, foot, follow up;  Other acute osteomyelitis, unspecified ankle and foot     TECHNIQUE:  Routine multiplanar CT foot protocol performed without IV contrast.     COMPARISON:  None     FINDINGS:  Soft tissue ulceration noted overlying the proximal 5th metatarsals.  There is adjacent cortical erosion, concerning for osteomyelitis.     There is a extensive degenerative changes throughout the hindfoot and midfoot, likely related to neuropathic arthropathy.  Muscle atrophy noted throughout the foot.  Tibiotalar joint effusion noted.  Soft tissue edema noted along the dorsal aspect of the foot.     Impression:     Cortical erosion at the base the 5th metatarsal, contiguous with overlying soft tissue ulceration, concerning for osteomyelitis.     This report was flagged in Epic as abnormal.                     Clinical Findings:  Left lateral foot ulceration with exposed bone.     Assessment/Plan:     Active Diagnoses:    Diagnosis Date Noted POA    PRINCIPAL PROBLEM:  Acute osteomyelitis of metatarsal bone of left foot [M86.172] 07/16/2020 Yes    Diabetic ulcer of toe  of left foot [E11.621, L97.529] 07/16/2020 Yes    COVID-19 virus infection [U07.1] 07/16/2020 Yes    Hyponatremia [E87.1] 07/16/2020 Yes    Acute renal failure superimposed on stage 3 chronic kidney disease [N17.9, N18.3] 07/16/2020 Yes    Type 2 diabetes mellitus with foot ulcer, without long-term current use of insulin [E11.621, L97.509] 11/08/2019 Yes    Hyperlipidemia, acquired [E78.5] 12/24/2014 Yes    Essential hypertension [I10] 08/26/2014 Yes      Problems Resolved During this Admission:           Education about the prevention of limb loss.    Discussed wound healing cycle, skin integrity, ways to care for skin.Counseled patient on the effects of blood glucose on healing. He verbalizes understanding that it can increase the chances of delayed healing and this prolonged exposure leads to infection or progression of infection which subsequently can result in loss of limb.    The wound is cleansed of foreign material as much as possible and the base inspected for bone or abscess.  No active purulence noted on today's encounter.  Bone still noted to wound bed.      Leukocytosis noted on labs for 3 days.Leukocytosis likely a leukemoid reaction to dexamethasone.  CT ordered on 7/21/20- negative for abscess.     Continue nursing orders placed for vashe wet to dry BID.     Vascular surgery consulted: per vascular no intervention at this time.    Will consider wound VAC application pending clinical course.      Podiatry will follow.    Discussed with charge nurse BID order for left foot dressing change and dressings should be changed again this evening.     Thank you for your consult. I will follow-up with patient. Please contact us if you have any additional questions.    Verona Whitmore DPM  Podiatry  [facility]

## 2020-07-22 NOTE — ASSESSMENT & PLAN NOTE
-Na was 128 on admit and he had no symptoms.  -On admit he appeared euvolemic and Usom 370, Ada 32 and Sosm 289.  - Discontinue chlorthalidone  - TSH low, FT4 normal  - random cortisol low at 1.5-- cortisol stim test ordered for 7/22 AM. Endocrine consulted

## 2020-07-22 NOTE — SUBJECTIVE & OBJECTIVE
"Interval History: "OK". SARS-CoV 2 infection and 5th metatarsal osteomyelitis due to Myroides and Peptostreptococcus. S/P I&D. Antibiotic therapy optimized to Zosyn monotherapy on 7/21 based on culture results. Leukocytosis noted suspected to be leukemoid reaction to steroids. CT of the foot without drainable fluid collections. Bone cultures now with S viridans.     Review of Systems   Constitutional: Negative for chills, fatigue and fever.   HENT: Negative for ear pain, mouth sores, nosebleeds, postnasal drip, rhinorrhea, sinus pressure, sore throat, tinnitus, trouble swallowing and voice change.    Eyes: Negative for photophobia, pain, redness and visual disturbance.   Respiratory: Negative for apnea, cough, chest tightness, shortness of breath and wheezing.    Cardiovascular: Negative for chest pain, palpitations and leg swelling.   Gastrointestinal: Negative for abdominal pain, blood in stool, constipation, diarrhea, nausea and vomiting.   Endocrine: Negative for cold intolerance, heat intolerance, polydipsia and polyuria.   Genitourinary: Negative for decreased urine volume, difficulty urinating, discharge, dysuria, flank pain, frequency, genital sores, hematuria, penile pain, penile swelling, scrotal swelling, testicular pain and urgency.   Musculoskeletal: Negative for arthralgias, back pain, joint swelling, myalgias and neck pain.   Skin: Positive for wound. Negative for color change and rash.   Allergic/Immunologic: Negative for environmental allergies and food allergies.   Neurological: Negative for dizziness, seizures, syncope, weakness, light-headedness, numbness and headaches.   Hematological: Negative for adenopathy. Does not bruise/bleed easily.   Psychiatric/Behavioral: Negative for agitation, confusion, decreased concentration, hallucinations, self-injury, sleep disturbance and suicidal ideas. The patient is not nervous/anxious.      Objective:     Vital Signs (Most Recent):  Temp: 97.8 °F (36.6 °C) " (07/22/20 0741)  Pulse: 85 (07/22/20 0741)  Resp: 20 (07/22/20 0741)  BP: (!) 161/91 (07/22/20 0741)  SpO2: 96 % (07/22/20 0741) Vital Signs (24h Range):  Temp:  [97.8 °F (36.6 °C)-98.2 °F (36.8 °C)] 97.8 °F (36.6 °C)  Pulse:  [72-85] 85  Resp:  [18-20] 20  SpO2:  [95 %-97 %] 96 %  BP: (149-171)/(80-91) 161/91     Weight: 112 kg (246 lb 14.6 oz)  Body mass index is 32.58 kg/m².    Estimated Creatinine Clearance: 30 mL/min (A) (based on SCr of 3.6 mg/dL (H)).    Physical Exam  Vitals signs reviewed.   Constitutional:       Appearance: He is well-developed.   HENT:      Head: Normocephalic and atraumatic.      Right Ear: External ear normal.      Left Ear: External ear normal.   Eyes:      General: No scleral icterus.        Right eye: No discharge.         Left eye: No discharge.      Conjunctiva/sclera: Conjunctivae normal.   Neck:      Musculoskeletal: Normal range of motion and neck supple.      Thyroid: No thyromegaly.   Cardiovascular:      Rate and Rhythm: Normal rate and regular rhythm.      Heart sounds: Normal heart sounds. No murmur.   Pulmonary:      Effort: Pulmonary effort is normal.      Breath sounds: Normal breath sounds. No wheezing or rales.   Abdominal:      General: Bowel sounds are normal.      Palpations: Abdomen is soft. There is no mass.      Tenderness: There is no abdominal tenderness. There is no rebound.   Musculoskeletal: Normal range of motion.      Comments: Left foot wrapped with gauze dressing.    Lymphadenopathy:      Cervical: No cervical adenopathy.   Skin:     General: Skin is warm and dry.      Comments: Tattoo's.    Neurological:      Mental Status: He is alert and oriented to person, place, and time.   Psychiatric:         Behavior: Behavior normal.         Significant Labs:   Blood Culture:   Recent Labs   Lab 07/16/20  1717 07/16/20  1722   LABBLOO No growth after 5 days. No growth after 5 days.     BMP:   Recent Labs   Lab 07/22/20  0508   *   *   K 5.3*   CL 99    CO2 21*   BUN 94*   CREATININE 3.6*   CALCIUM 8.2*   MG 1.9     CBC:   Recent Labs   Lab 07/21/20  0430 07/22/20  0508   WBC 18.59* 17.82*   HGB 10.9* 10.9*   HCT 33.0* 33.7*    278     Wound Culture:   Recent Labs   Lab 04/20/20  1107 07/16/20  1129 07/17/20  1243 07/17/20  1258   LABAERO PSEUDOMONAS AERUGINOSA  Few  No other significant isolate  * MYROIDES SPECIES  Moderate  Skin josefa also present  * No significant isolate VIRIDANS STREPTOCOCCUS GROUP  Moderate  *       Significant Imaging: I have reviewed all pertinent imaging results/findings within the past 24 hours.

## 2020-07-22 NOTE — NURSING
"While standing in front of pt room getting ready to go in and give meds and assist him to bathroom pt heard by this nurse say "woah" I immediately entered pt room,pt observed on the floor lying on his belly,he states "my bowel bowels started coming right a away and I couldn't go all the way because the iv pump got stuck and couldn't go no more and I think I slipped in my urine" pt hospital; phone wire noted wrapped around IV pump electrical card which was plugged at the food of the bed,pt denies pain assessed for injury pt able to move all extremities well,denies pain states "the only thing I hurted was my pride" pt assisted off floor and to the bathroom pt re educated on calling  for assistance for his safety and comfort so he don't trip slip and fall he states "I know y'all told me that but I usually go by myself but "it just came fast, bed alarm now in use call light in reach continue to monitor,pt informed wife while this nurse was present.Dr Aguilar notified while noted on espino at 2:30p.  "

## 2020-07-22 NOTE — ASSESSMENT & PLAN NOTE
Osteomyelitis of 5th metatarsal presumed to be acute while awaiting biopsy results. Wound cultures with Myroides and Peptostreptococcus. Preliminary bone cultures with Peptostreptococcus and S viridans. Leukocytosis likely a leukemoid reaction to dexamethasone.  CT without drainable fluid collections.   · Agree with Zosyn.   · Consider nephrology evaluation.  · WAncipitate need for  6 weeks of IV antibiotics if no further debridement needed. EOC:  9/1/2020  · Will repeat ESR and CRP tomorrow.  · Will need weekly CBC, CMP, ESR and CRP.   · Please fax results to [phone] care of Dr. Resendiz.   · [facility] follow up on 8/12/20 at 9 am.

## 2020-07-22 NOTE — ASSESSMENT & PLAN NOTE
-His father just passed away from covid infection and he has tested positive in the ER  -He remains afebrile without any symptoms at this time.  O2 sats dropped to 94% once time.  Not requiring supplemental O2.  -Continue covid isolation  -Continue vitamin C and multivitamin  -O2 sats dropped to 94 and was started on dexamethasone. He has not been hypoxic since and has no O2 requirement. He is developing difficult to control hyperglycemia due to dexamethasone. Dexamethasone discontinued on 7/22 after 6 doses as he remains stable on room air with no symptoms of COVID

## 2020-07-23 LAB
ANION GAP SERPL CALC-SCNC: 7 MMOL/L (ref 8–16)
BASOPHILS NFR BLD: 0 % (ref 0–1.9)
BUN SERPL-MCNC: 93 MG/DL (ref 6–20)
CALCIUM SERPL-MCNC: 8.2 MG/DL (ref 8.7–10.5)
CHLORIDE SERPL-SCNC: 101 MMOL/L (ref 95–110)
CO2 SERPL-SCNC: 21 MMOL/L (ref 23–29)
CREAT SERPL-MCNC: 3.6 MG/DL (ref 0.5–1.4)
DIFFERENTIAL METHOD: ABNORMAL
EOSINOPHIL NFR BLD: 0 % (ref 0–8)
ERYTHROCYTE [DISTWIDTH] IN BLOOD BY AUTOMATED COUNT: 12.2 % (ref 11.5–14.5)
EST. GFR  (AFRICAN AMERICAN): 21 ML/MIN/1.73 M^2
EST. GFR  (NON AFRICAN AMERICAN): 18 ML/MIN/1.73 M^2
GLUCOSE SERPL-MCNC: 265 MG/DL (ref 70–110)
HCT VFR BLD AUTO: 32.6 % (ref 40–54)
HGB BLD-MCNC: 10.7 G/DL (ref 14–18)
IMM GRANULOCYTES # BLD AUTO: ABNORMAL K/UL (ref 0–0.04)
IMM GRANULOCYTES NFR BLD AUTO: ABNORMAL % (ref 0–0.5)
LYMPHOCYTES NFR BLD: 6 % (ref 18–48)
MCH RBC QN AUTO: 26.6 PG (ref 27–31)
MCHC RBC AUTO-ENTMCNC: 32.8 G/DL (ref 32–36)
MCV RBC AUTO: 81 FL (ref 82–98)
METAMYELOCYTES NFR BLD MANUAL: 1 %
MONOCYTES NFR BLD: 4 % (ref 4–15)
MYELOCYTES NFR BLD MANUAL: 4 %
NEUTROPHILS NFR BLD: 83 % (ref 38–73)
NEUTS BAND NFR BLD MANUAL: 2 %
NRBC BLD-RTO: 0 /100 WBC
PLATELET # BLD AUTO: 340 K/UL (ref 150–350)
PMV BLD AUTO: 9.2 FL (ref 9.2–12.9)
POCT GLUCOSE: 194 MG/DL (ref 70–110)
POCT GLUCOSE: 225 MG/DL (ref 70–110)
POCT GLUCOSE: 227 MG/DL (ref 70–110)
POCT GLUCOSE: 272 MG/DL (ref 70–110)
POTASSIUM SERPL-SCNC: 5.3 MMOL/L (ref 3.5–5.1)
RBC # BLD AUTO: 4.02 M/UL (ref 4.6–6.2)
SODIUM SERPL-SCNC: 129 MMOL/L (ref 136–145)
TOXIC GRANULES BLD QL SMEAR: PRESENT
WBC # BLD AUTO: 19.73 K/UL (ref 3.9–12.7)

## 2020-07-23 PROCEDURE — 80048 BASIC METABOLIC PNL TOTAL CA: CPT

## 2020-07-23 PROCEDURE — 99232 PR SUBSEQUENT HOSPITAL CARE,LEVL II: ICD-10-PCS | Mod: ,,, | Performed by: PODIATRIST

## 2020-07-23 PROCEDURE — 36415 COLL VENOUS BLD VENIPUNCTURE: CPT

## 2020-07-23 PROCEDURE — 99232 SBSQ HOSP IP/OBS MODERATE 35: CPT | Mod: ,,, | Performed by: PODIATRIST

## 2020-07-23 PROCEDURE — 25000003 PHARM REV CODE 250: Performed by: HOSPITALIST

## 2020-07-23 PROCEDURE — A4216 STERILE WATER/SALINE, 10 ML: HCPCS | Performed by: HOSPITALIST

## 2020-07-23 PROCEDURE — 63600175 PHARM REV CODE 636 W HCPCS: Performed by: HOSPITALIST

## 2020-07-23 PROCEDURE — 94761 N-INVAS EAR/PLS OXIMETRY MLT: CPT

## 2020-07-23 PROCEDURE — 85027 COMPLETE CBC AUTOMATED: CPT

## 2020-07-23 PROCEDURE — 85007 BL SMEAR W/DIFF WBC COUNT: CPT

## 2020-07-23 PROCEDURE — 99900035 HC TECH TIME PER 15 MIN (STAT)

## 2020-07-23 PROCEDURE — 11000001 HC ACUTE MED/SURG PRIVATE ROOM

## 2020-07-23 RX ORDER — GABAPENTIN 300 MG/1
300 CAPSULE ORAL 2 TIMES DAILY
Qty: 60 CAPSULE | Refills: 11 | Status: SHIPPED | OUTPATIENT
Start: 2020-07-23 | End: 2021-11-15

## 2020-07-23 RX ORDER — HYDRALAZINE HYDROCHLORIDE 25 MG/1
50 TABLET, FILM COATED ORAL EVERY 8 HOURS
Status: DISCONTINUED | OUTPATIENT
Start: 2020-07-23 | End: 2020-07-24 | Stop reason: HOSPADM

## 2020-07-23 RX ORDER — CARVEDILOL 25 MG/1
25 TABLET ORAL 2 TIMES DAILY
Qty: 60 TABLET | Refills: 11 | Status: SHIPPED | OUTPATIENT
Start: 2020-07-23 | End: 2021-05-25 | Stop reason: SDUPTHER

## 2020-07-23 RX ORDER — CARVEDILOL 12.5 MG/1
25 TABLET ORAL 2 TIMES DAILY
Status: DISCONTINUED | OUTPATIENT
Start: 2020-07-23 | End: 2020-07-24 | Stop reason: HOSPADM

## 2020-07-23 RX ADMIN — ACETAMINOPHEN 650 MG: 325 TABLET ORAL at 11:07

## 2020-07-23 RX ADMIN — ATORVASTATIN CALCIUM 40 MG: 40 TABLET, FILM COATED ORAL at 08:07

## 2020-07-23 RX ADMIN — GABAPENTIN 300 MG: 300 CAPSULE ORAL at 09:07

## 2020-07-23 RX ADMIN — Medication 10 ML: at 08:07

## 2020-07-23 RX ADMIN — HEPARIN SODIUM 5000 UNITS: 5000 INJECTION INTRAVENOUS; SUBCUTANEOUS at 05:07

## 2020-07-23 RX ADMIN — HYDRALAZINE HYDROCHLORIDE 50 MG: 25 TABLET, FILM COATED ORAL at 02:07

## 2020-07-23 RX ADMIN — INSULIN ASPART 12 UNITS: 100 INJECTION, SOLUTION INTRAVENOUS; SUBCUTANEOUS at 08:07

## 2020-07-23 RX ADMIN — INSULIN ASPART 12 UNITS: 100 INJECTION, SOLUTION INTRAVENOUS; SUBCUTANEOUS at 05:07

## 2020-07-23 RX ADMIN — HYDRALAZINE HYDROCHLORIDE 50 MG: 25 TABLET, FILM COATED ORAL at 08:07

## 2020-07-23 RX ADMIN — CARVEDILOL 25 MG: 12.5 TABLET, FILM COATED ORAL at 08:07

## 2020-07-23 RX ADMIN — ACETAMINOPHEN 650 MG: 325 TABLET ORAL at 02:07

## 2020-07-23 RX ADMIN — THERA TABS 1 TABLET: TAB at 08:07

## 2020-07-23 RX ADMIN — PIPERACILLIN AND TAZOBACTAM 4.5 G: 4; .5 INJECTION, POWDER, LYOPHILIZED, FOR SOLUTION INTRAVENOUS; PARENTERAL at 11:07

## 2020-07-23 RX ADMIN — PIPERACILLIN AND TAZOBACTAM 4.5 G: 4; .5 INJECTION, POWDER, LYOPHILIZED, FOR SOLUTION INTRAVENOUS; PARENTERAL at 05:07

## 2020-07-23 RX ADMIN — CARVEDILOL 25 MG: 12.5 TABLET, FILM COATED ORAL at 09:07

## 2020-07-23 RX ADMIN — GABAPENTIN 300 MG: 300 CAPSULE ORAL at 08:07

## 2020-07-23 RX ADMIN — PIPERACILLIN AND TAZOBACTAM 4.5 G: 4; .5 INJECTION, POWDER, LYOPHILIZED, FOR SOLUTION INTRAVENOUS; PARENTERAL at 12:07

## 2020-07-23 RX ADMIN — Medication 10 ML: at 12:07

## 2020-07-23 RX ADMIN — OXYCODONE HYDROCHLORIDE AND ACETAMINOPHEN 500 MG: 500 TABLET ORAL at 08:07

## 2020-07-23 RX ADMIN — INSULIN ASPART 2 UNITS: 100 INJECTION, SOLUTION INTRAVENOUS; SUBCUTANEOUS at 12:07

## 2020-07-23 RX ADMIN — HEPARIN SODIUM 5000 UNITS: 5000 INJECTION INTRAVENOUS; SUBCUTANEOUS at 02:07

## 2020-07-23 RX ADMIN — AMLODIPINE BESYLATE 10 MG: 5 TABLET ORAL at 08:07

## 2020-07-23 RX ADMIN — INSULIN ASPART 12 UNITS: 100 INJECTION, SOLUTION INTRAVENOUS; SUBCUTANEOUS at 12:07

## 2020-07-23 RX ADMIN — OXYCODONE HYDROCHLORIDE AND ACETAMINOPHEN 500 MG: 500 TABLET ORAL at 09:07

## 2020-07-23 RX ADMIN — INSULIN ASPART 4 UNITS: 100 INJECTION, SOLUTION INTRAVENOUS; SUBCUTANEOUS at 05:07

## 2020-07-23 RX ADMIN — INSULIN ASPART 4 UNITS: 100 INJECTION, SOLUTION INTRAVENOUS; SUBCUTANEOUS at 08:07

## 2020-07-23 RX ADMIN — PIPERACILLIN AND TAZOBACTAM 4.5 G: 4; .5 INJECTION, POWDER, LYOPHILIZED, FOR SOLUTION INTRAVENOUS; PARENTERAL at 08:07

## 2020-07-23 RX ADMIN — Medication 10 ML: at 05:07

## 2020-07-23 RX ADMIN — Medication 10 ML: at 11:07

## 2020-07-23 RX ADMIN — HEPARIN SODIUM 5000 UNITS: 5000 INJECTION INTRAVENOUS; SUBCUTANEOUS at 09:07

## 2020-07-23 RX ADMIN — SODIUM ZIRCONIUM CYCLOSILICATE 10 G: 10 POWDER, FOR SUSPENSION ORAL at 05:07

## 2020-07-23 RX ADMIN — HYDRALAZINE HYDROCHLORIDE 50 MG: 25 TABLET, FILM COATED ORAL at 09:07

## 2020-07-23 RX ADMIN — INSULIN ASPART 3 UNITS: 100 INJECTION, SOLUTION INTRAVENOUS; SUBCUTANEOUS at 09:07

## 2020-07-23 NOTE — PLAN OF CARE
Problem: Fall Injury Risk  Goal: Absence of Fall and Fall-Related Injury  Outcome: Ongoing, Progressing  Intervention: Identify and Manage Contributors to Fall Injury Risk  Flowsheets (Taken 7/23/2020 1519)  Self-Care Promotion:   independence encouraged   BADL personal objects within reach   meal setup provided  Medication Review/Management:   medications reviewed   high risk medications identified     Problem: Adult Inpatient Plan of Care  Goal: Plan of Care Review  Outcome: Ongoing, Progressing  Flowsheets (Taken 7/23/2020 1519)  Plan of Care Reviewed With: patient  Goal: Patient-Specific Goal (Individualization)  Outcome: Ongoing, Progressing  Goal: Absence of Hospital-Acquired Illness or Injury  Outcome: Ongoing, Progressing  Intervention: Identify and Manage Fall Risk  Flowsheets (Taken 7/23/2020 1519)  Safety Promotion/Fall Prevention:   assistive device/personal item within reach   bed alarm set   Fall Risk reviewed with patient/family   medications reviewed   side rails raised x 2   instructed to call staff for mobility   nonskid shoes/socks when out of bed   high risk medications identified  Goal: Optimal Comfort and Wellbeing  Outcome: Ongoing, Progressing  Goal: Readiness for Transition of Care  Outcome: Ongoing, Progressing  Goal: Rounds/Family Conference  Outcome: Ongoing, Progressing     Problem: Diabetes Comorbidity  Goal: Blood Glucose Level Within Desired Range  Outcome: Ongoing, Progressing  Intervention: Maintain Glycemic Control  Flowsheets (Taken 7/23/2020 1519)  Glycemic Management:   blood glucose monitoring   supplemental insulin given     Problem: Electrolyte Imbalance (Acute Kidney Injury/Impairment)  Goal: Serum Electrolyte Balance  Outcome: Ongoing, Progressing     Problem: Fluid Imbalance (Acute Kidney Injury/Impairment)  Goal: Optimal Fluid Balance  Outcome: Ongoing, Progressing     Problem: Hematologic Alteration (Acute Kidney Injury/Impairment)  Goal: Hemoglobin, Hematocrit and  Platelets Within Normal Range  Outcome: Ongoing, Progressing     Problem: Oral Intake Inadequate (Acute Kidney Injury/Impairment)  Goal: Optimal Nutrition Intake  Outcome: Ongoing, Progressing     Problem: Renal Function Impairment (Acute Kidney Injury/Impairment)  Goal: Effective Renal Function  Outcome: Ongoing, Progressing     Problem: Infection  Goal: Infection Symptom Resolution  Outcome: Ongoing, Progressing  Intervention: Prevent or Manage Infection  Flowsheets (Taken 7/23/2020 1519)  Fever Reduction/Comfort Measures: medication administered  Isolation Precautions:   airborne precautions maintained   contact precautions maintained   droplet precautions maintained     Problem: Skin Injury Risk Increased  Goal: Skin Health and Integrity  Outcome: Ongoing, Progressing  Intervention: Optimize Skin Protection  Flowsheets (Taken 7/23/2020 1519)  Pressure Reduction Techniques:   frequent weight shift encouraged   heels elevated off bed   pressure points protected  Pressure Reduction Devices: heel offloading device utilized  Skin Protection:   tubing/devices free from skin contact   adhesive use limited  Head of Bed (HOB): HOB at 30-45 degrees   Pt alert able to make needs known,mica meds well,IV abx remains in progress,no s/s adverse reaction noted,reposition self q 2hrs,pain controlled by prn pain medication,dressing clean dry intact to lt foot,remains free from falls and pressure injuries,safety maintained,continue monitoring.

## 2020-07-23 NOTE — PLAN OF CARE
[facility]    HOME HEALTH ORDERS  FACE TO FACE ENCOUNTER    Patient Name: Catalino Mcfadden  YOB: 1965    PCP: Azikiwe K Lombard, MD   PCP Address: [address] / [place] [address]  PCP Phone Number: [phone]  PCP Fax: [phone]    Encounter Date: 07/24/2020    Admit to Home Health    Diagnoses:  Active Hospital Problems    Diagnosis  POA    *Acute osteomyelitis of metatarsal bone of left foot [M86.172]  Yes    Hyperkalemia [E87.5]  No    Diabetic ulcer of toe of left foot [E11.621, L97.529]  Yes    COVID-19 virus infection [U07.1]  Yes    Hyponatremia [E87.1]  Yes    Acute renal failure superimposed on stage 3 chronic kidney disease [N17.9, N18.3]  Yes    Type 2 diabetes mellitus with foot ulcer, without long-term current use of insulin [E11.621, L97.509]  Yes    Hyperlipidemia, acquired [E78.5]  Yes    Essential hypertension [I10]  Yes      Resolved Hospital Problems   No resolved problems to display.       Future Appointments   Date Time Provider Department Center   7/30/2020 11:20 AM Azikiwe K. Lombard, MD [facility]   8/12/2020  9:00 AM INFECTIOUS DISEASE, [facility]     Follow-up Information     [facility] - Infectious Disease. Go on 8/12/2020.    Specialty: Infectious Diseases  Why: at 9 am for antibiotic management.  Contact information:  [address], [address]  [facility] [address] [phone]  Additional information:  [address]                   I have seen and examined this patient face to face today. My clinical findings that support the need for the home health skilled services and home bound status are the following:  Medical restrictions requiring assistance of another human to leave home due to  IV infusion Needs and Wound care needs.    Allergies:  Review of patient's allergies indicates:   Allergen Reactions    Morphine Hallucinations       Diet: diabetic diet: 2000 calorie    Activities: activity as  tolerated    Nursing:   SN to complete comprehensive assessment including routine vital signs. Instruct on disease process and s/s of complications to report to MD. Review/verify medication list sent home with the patient at time of discharge  and instruct patient/caregiver as needed. Frequency may be adjusted depending on start of care date.    Notify MD if SBP > 160 or < 90; DBP > 90 or < 50; HR > 120 or < 50; Temp > 101    MISCELLANEOUS CARE:  Home Infusion Therapy:   SN to perform Infusion Therapy/Central Line Care.  Review Central Line Care & Central Line Flush with patient.    Administer (drug and dose): piperacillin-tazobactam 4.5g Q8 hours  Last dose given: 7/24/2020   0900                      Home dose due: 7/24/2020 1700    Scrub the Hub: Prior to accessing the line, always perform a 30 second alcohol scrub  Each lumen of the central line is to be flushed at least daily with 10 mL Normal Saline and 3 mL Heparin flush (10 units/mL)  Skilled Nurse (SN) may draw blood from IV access  Blood Draw Procedure:   - Aspirate at least 5 mL of blood   - Discard   - Obtain specimen   - Change injection cap   - Flush with 20 mL Normal Saline followed by a                 3-5 mL Heparin flush (10 units/mL)  Central :   - Sterile dressing changes are done weekly and as needed.   - Use chlor-hexadine scrub to cleanse site, apply Biopatch to insertion site,       apply securement device dressing   - Injection caps are changed weekly and after EVERY lab draw.   - If sterile gauze is under dressing to control oozing,                 dressing change must be performed every 24 hours until gauze is not needed.    Estimated end date: 9/1/2020. Will be finalized in Infectious Disease clinic.     Please check CBC, CMP, ESR, CRP weekly on Mondays. Fax lab results to Dr Mancia at [phone].    WOUND CARE ORDERS  yes: DM foot ulcer   Location:  Left foot     Vasche wet to dry BID      Medications: Review discharge  medications with patient and family and provide education.      Current Discharge Medication List      START taking these medications    Details   carvediloL (COREG) 25 MG tablet Take 1 tablet (25 mg total) by mouth 2 (two) times daily.  Qty: 60 tablet, Refills: 11    Comments: .      gabapentin (NEURONTIN) 300 MG capsule Take 1 capsule (300 mg total) by mouth 2 (two) times daily.  Qty: 60 capsule, Refills: 11      hydrALAZINE (APRESOLINE) 50 MG tablet Take 1 tablet (50 mg total) by mouth every 8 (eight) hours.  Qty: 90 tablet, Refills: 11    Comments: .      insulin aspart U-100 (NOVOLOG) 100 unit/mL (3 mL) InPn pen Inject 10 Units into the skin 3 (three) times daily with meals.  Qty: 27 mL, Refills: 3      insulin detemir U-100 (LEVEMIR FLEXTOUCH) 100 unit/mL (3 mL) SubQ InPn pen Inject 20 Units into the skin 2 (two) times daily.  Qty: 36 mL, Refills: 3         CONTINUE these medications which have NOT CHANGED    Details   amLODIPine (NORVASC) 10 MG tablet Take 1 tablet (10 mg total) by mouth once daily.  Qty: 90 tablet, Refills: 3      atorvastatin (LIPITOR) 40 MG tablet Take 1 tablet (40 mg total) by mouth once daily.  Qty: 90 tablet, Refills: 3    Associated Diagnoses: Pure hypercholesterolemia      blood sugar diagnostic Strp 1 strip by Misc.(Non-Drug; Combo Route) route 3 (three) times daily. Patient needs One Touch Test Strips (Berio).  Qty: 100 strip, Refills: 2    Associated Diagnoses: Uncontrolled type 2 diabetes mellitus with complication, with long-term current use of insulin      TRULICITY 0.75 mg/0.5 mL PnIj INJECT 0.5 ML UNDER THE SKIN EVERY 7 DAYS  Qty: 2 mL, Refills: 5    Associated Diagnoses: Uncontrolled type 2 diabetes mellitus with stage 3 chronic kidney disease, without long-term current use of insulin         STOP taking these medications       chlorthalidone (HYGROTEN) 25 MG Tab Comments:   Reason for Stopping:         diltiaZEM (CARDIZEM CD) 240 MG 24 hr capsule Comments:   Reason for  Stopping:         diltiaZEM (CARDIZEM CD) 360 MG 24 hr capsule Comments:   Reason for Stopping:         lisinopriL (PRINIVIL,ZESTRIL) 20 MG tablet Comments:   Reason for Stopping:               I certify that this patient is confined to his home and needs intermittent skilled nursing care.    Cande Aguilar MD   07/24/2020  9:52 AM

## 2020-07-23 NOTE — ASSESSMENT & PLAN NOTE
-Na was 128 on admit and he had no symptoms. He drinks a lot of water at home  -On admit he appeared euvolemic and Usom 370, Ada 32 and Sosm 289.  - Discontinue chlorthalidone  - TSH low, FT4 normal  - random cortisol low at 1.5-- cortisol stim test ordered for 7/22 AM was abnormal due to dexamethasone use. No signs/symptoms of adrenal insufficiency

## 2020-07-23 NOTE — ASSESSMENT & PLAN NOTE
-He had a diabetic foot wound that has failed outpatient treatment with cipro and radiographic evidence of osteomyelitis  -On admit he was afebrile with normal wbc and CRP elevated at 233.  -Doppler US with BLANK obtained.  Vascular surgery consulted and state he has adequate perfusion to heal his wounds.  No intervention needed at this time.  -Podiatry consulted and took patient for debridement and bone biopsy 7/18.  - Wound cultures/bone culture Myroides and Peptostreptococcus. ID consulted. On zosyn x6 weeks   - Podiatry planning wound vac at [place]-- still waiting for paperwork

## 2020-07-23 NOTE — PROGRESS NOTES
[facility] Hospital Medicine  Progress Note    Patient Name: Catalino Mcfadden  MRN: [id number]  Patient Class: IP- Inpatient   Admission Date: 7/16/2020  Length of Stay: 7 days  Attending Physician: Cande Aguilar MD  Primary Care Provider: Azikiwe K Lombard, MD        Subjective:     Principal Problem:Acute osteomyelitis of metatarsal bone of left foot        HPI:  Mr. Mcfadden is a 55 year old man with diabetes mellitus, diabetic foot ulcers, hypertension, hyperlipidemia and CKDIII who was found to have osteomyelitis on outside imaging and sent in by his podiatrist, Dr. Martinez.  He states that he has had ulcers on his left foot for quite some time and they had been stable.  Then 2.5 weeks ago he noted a blister on his left foot which subsequently popped.  After discussing with his podiatrist he was started on cipro.  At the time, he was in [place], [place] because his father was ill and in the hospital.  His father passed away with complications from covid-19 infection.  Because he was out of town attending to his father's illness, he was unable to follow up with Dr. Martinez until today.  Xray of his foot was obtained and showed osteomyelitis of his left 5th toe.  He noted a malodorous drainage and a mild achey pain.  He denied fevers, chills, nausea, vomiting, diarrhea, lethargy, malaise, shortness of breath or cough.  He notes he drinks at least a gallon of water daily.    Overview/Hospital Course:  55 year old man with history of diabetes mellitus, diabetic foot ulcers, hypertension, hyperlipidemia and CKDIII who was found to have osteomyelitis on outside imaging and sent in by his podiatrist, Dr. Martinez.  He has had bone biopsy/culture and debridement by podiatry.  Wound culture 7/16 growing Prevotella and Myroides; wound culture 7/17 growing Peptostreptococcus. Bone culture 7/17 with Peptostreptococcus.  ID consulted. PICC already in place for outpatient antibiotics. CT foot 7/21 shows osteomyelitis and  soft tissue ulceration but no abscess. On zosyn.     Also, he has covid - but is completely asymptomatic.  O2 sats got to 94, so he was started on dexamethasone. He has remained off supplemental O2, so dexamethasone was discontinued.     Has mild hyponatremia on fluid restriction (drinks at least a gallon of water daily at home).  Serum cortisol abnormally low at 1.5 on admit. Endocrine consulted and suspect that abnormal ACTH stim test is due to dexamethasone suppressing adrenal axis. His mild acute/chronic renal failure is at baseline.     Interval History: No complaints. Feels ready to go home.    Review of Systems   Constitutional: Negative for chills and fever.   Respiratory: Negative for cough and shortness of breath.    Cardiovascular: Negative for chest pain.   Gastrointestinal: Negative for abdominal pain.   Genitourinary: Negative for difficulty urinating.     Objective:     Vital Signs (Most Recent):  Temp: 97.7 °F (36.5 °C) (07/23/20 1208)  Pulse: 64 (07/23/20 1208)  Resp: 18 (07/23/20 1208)  BP: (!) 146/84 (07/23/20 1208)  SpO2: (!) 94 % (07/23/20 1208) Vital Signs (24h Range):  Temp:  [97.1 °F (36.2 °C)-97.9 °F (36.6 °C)] 97.7 °F (36.5 °C)  Pulse:  [62-70] 64  Resp:  [18-20] 18  SpO2:  [94 %-99 %] 94 %  BP: (146-181)/(82-96) 146/84     Weight: 112 kg (246 lb 14.6 oz)  Body mass index is 32.58 kg/m².    Intake/Output Summary (Last 24 hours) at 7/23/2020 1627  Last data filed at 7/23/2020 0837  Gross per 24 hour   Intake 360 ml   Output 1201 ml   Net -841 ml      Physical Exam  Vitals signs and nursing note reviewed.   Constitutional:       General: He is not in acute distress.     Appearance: He is obese. He is not toxic-appearing.   HENT:      Head: Normocephalic and atraumatic.   Cardiovascular:      Rate and Rhythm: Normal rate.      Pulses: Normal pulses.      Heart sounds: Normal heart sounds. No murmur. No gallop.    Pulmonary:      Effort: Pulmonary effort is normal. No respiratory distress.       Breath sounds: Normal breath sounds. No wheezing or rales.      Comments: Room air  Abdominal:      General: Abdomen is flat. Bowel sounds are normal. There is no distension.      Tenderness: There is no abdominal tenderness. There is no guarding.   Musculoskeletal:         General: No swelling.      Comments: L foot bandaged and in boot, not removed   Skin:     General: Skin is warm and dry.      Comments: R arm PICC in place   Neurological:      Mental Status: He is alert.         Significant Labs: All pertinent labs within the past 24 hours have been reviewed.    Significant Imaging: I have reviewed and interpreted all pertinent imaging results/findings within the past 24 hours.      Assessment/Plan:      * Acute osteomyelitis of metatarsal bone of left foot  -He had a diabetic foot wound that has failed outpatient treatment with cipro and radiographic evidence of osteomyelitis  -On admit he was afebrile with normal wbc and CRP elevated at 233.  -Doppler US with BLANK obtained.  Vascular surgery consulted and state he has adequate perfusion to heal his wounds.  No intervention needed at this time.  -Podiatry consulted and took patient for debridement and bone biopsy 7/18.  - Wound cultures/bone culture Myroides and Peptostreptococcus. ID consulted. On zosyn x6 weeks   - Podiatry planning wound vac at [place]-- still waiting for paperwork    Hyperkalemia  Hold lisinopril  Lokelma x1 on 7/23      Acute renal failure superimposed on stage 3 chronic kidney disease  -Baseline Cr is 2.8-3.1 and on admit Cr is mildly above baseline at 3.8.  -Could be due to mild diminished appetite vs infection.  -Renally dose medications and avoid nephrotoxic agents  - Improving  - hold lisinopril  - with hyperkalemia-- lokelma x1 on 7/23    Hyponatremia  -Na was 128 on admit and he had no symptoms. He drinks a lot of water at home  -On admit he appeared euvolemic and Usom 370, Ada 32 and Sosm 289.  - Discontinue chlorthalidone  - TSH low,  FT4 normal  - random cortisol low at 1.5-- cortisol stim test ordered for 7/22 AM was abnormal due to dexamethasone use. No signs/symptoms of adrenal insufficiency    COVID-19 virus infection  -His father just passed away from covid infection and he has tested positive in the ER  -He remains afebrile without any symptoms at this time.  O2 sats dropped to 94% once time.  Not requiring supplemental O2.  -Continue covid isolation  -Continue vitamin C and multivitamin  -O2 sats dropped to 94 and was started on dexamethasone. He has not been hypoxic since and has no O2 requirement. He is developing difficult to control hyperglycemia due to dexamethasone. Dexamethasone discontinued on 7/22 after 6 doses as he remains stable on room air with no symptoms of COVID     Diabetic ulcer of toe of left foot  -Treatment as above    Type 2 diabetes mellitus with foot ulcer, without long-term current use of insulin  -A1c 8.1  -At home takes trulicity weekly.  Hold this while inpatient.  -Continue detemir 2/520 and aspart 12 QAC-- suspect requirements should decrease as decadron clears from his system  -Continue moderate dose ISS.  -Continue diabetic diet     Hyperlipidemia, acquired  -Continue home statin      Essential hypertension  -BP was normal on admit.  Mildly elevated today.  -At home takes norvasc, diltiazem, lisinopril and chlorthalidone  -Odd to take two calcium channel blockers. DC diltiazem at discharge. Will also DC chlorthalidone at discharge  -Will continue norvasc  - hold lisinopril for hyperK-- may need to revisit if he can take this Rx at [place]  - added coreg for better BP control  -Continue prn hydralazine.        VTE Risk Mitigation (From admission, onward)         Ordered     heparin (porcine) injection 5,000 Units  Every 8 hours      07/16/20 1727     IP VTE HIGH RISK PATIENT  Once      07/16/20 1727     Place sequential compression device  Until discontinued      07/16/20 1727                      Cande Pruett  MD Justin  Department of Hospital Medicine   [facility]

## 2020-07-23 NOTE — ASSESSMENT & PLAN NOTE
-BP was normal on admit.  Mildly elevated today.  -At home takes norvasc, diltiazem, lisinopril and chlorthalidone  -Odd to take two calcium channel blockers. DC diltiazem at discharge. Will also DC chlorthalidone at discharge  -Will continue norvasc  - hold lisinopril for hyperK-- may need to revisit if he can take this Rx at [place]  - added coreg for better BP control  -Continue prn hydralazine.

## 2020-07-23 NOTE — SUBJECTIVE & OBJECTIVE
Interval History: No complaints. Feels ready to go home.    Review of Systems   Constitutional: Negative for chills and fever.   Respiratory: Negative for cough and shortness of breath.    Cardiovascular: Negative for chest pain.   Gastrointestinal: Negative for abdominal pain.   Genitourinary: Negative for difficulty urinating.     Objective:     Vital Signs (Most Recent):  Temp: 97.7 °F (36.5 °C) (07/23/20 1208)  Pulse: 64 (07/23/20 1208)  Resp: 18 (07/23/20 1208)  BP: (!) 146/84 (07/23/20 1208)  SpO2: (!) 94 % (07/23/20 1208) Vital Signs (24h Range):  Temp:  [97.1 °F (36.2 °C)-97.9 °F (36.6 °C)] 97.7 °F (36.5 °C)  Pulse:  [62-70] 64  Resp:  [18-20] 18  SpO2:  [94 %-99 %] 94 %  BP: (146-181)/(82-96) 146/84     Weight: 112 kg (246 lb 14.6 oz)  Body mass index is 32.58 kg/m².    Intake/Output Summary (Last 24 hours) at 7/23/2020 1627  Last data filed at 7/23/2020 0837  Gross per 24 hour   Intake 360 ml   Output 1201 ml   Net -841 ml      Physical Exam  Vitals signs and nursing note reviewed.   Constitutional:       General: He is not in acute distress.     Appearance: He is obese. He is not toxic-appearing.   HENT:      Head: Normocephalic and atraumatic.   Cardiovascular:      Rate and Rhythm: Normal rate.      Pulses: Normal pulses.      Heart sounds: Normal heart sounds. No murmur. No gallop.    Pulmonary:      Effort: Pulmonary effort is normal. No respiratory distress.      Breath sounds: Normal breath sounds. No wheezing or rales.      Comments: Room air  Abdominal:      General: Abdomen is flat. Bowel sounds are normal. There is no distension.      Tenderness: There is no abdominal tenderness. There is no guarding.   Musculoskeletal:         General: No swelling.      Comments: L foot bandaged and in boot, not removed   Skin:     General: Skin is warm and dry.      Comments: R arm PICC in place   Neurological:      Mental Status: He is alert.         Significant Labs: All pertinent labs within the past 24  hours have been reviewed.    Significant Imaging: I have reviewed and interpreted all pertinent imaging results/findings within the past 24 hours.

## 2020-07-23 NOTE — ASSESSMENT & PLAN NOTE
-A1c 8.1  -At home takes trulicity weekly.  Hold this while inpatient.  -Continue detemir 2/520 and aspart 12 QAC-- suspect requirements should decrease as decadron clears from his system  -Continue moderate dose ISS.  -Continue diabetic diet

## 2020-07-23 NOTE — PROGRESS NOTES
[facility]  Podiatry  Progress Note    Patient Name: Catalino Mcfadden  MRN: [id number]  Admission Date: 7/16/2020  Hospital Length of Stay: 7 days  Attending Physician: Cande Aguilar MD  Primary Care Provider: Azikiwe K Lombard, MD     Subjective:     History of Present Illness: 56 y/o male PMH DM2, CKD admitted for left foot infection. Reports left foot ulceration x 3 weeks, thinks this started due to rubbing of brace on the lateral foot. Pt. Followed by Dr. Martinez per chart review contacted the office on 6/29/20 stating that he was heading out of town to [place] to check on his father. Rx. Cipro then sent to pharmacy which patient reports taking. He returned to [place] yesterday and was seen by . Xray left foot ordered revealing OM, gas forming infection. Sent to ED for admission. Denies N/V/F. Reports some chills.     7/18/20: S/p one day left foot debridement, bone biopsy. Denies pedal pain. CAM boot intact left foot.     7/19/20: S/p 2 days left foot debridement, bone biopsy. CAM boot in place left foot.     7/20/20: Patient seen bedside s/p 3 days left foot debridement. CAM boot intact.     07/21/2020 Patient seen bedside s/p 4 days left foot debridement. CAM boot intact.     7/22/20: Patient seen bedside s/p 5 days left foot debridement. CAM boot intact. CT negative for abscess. Reports bandages were not changed yesterday evening. Reports receiving bandage change once yesterday. Current wound care orders are BID dressing change.     07/23/2020 Patient seen bedside s/p 6 days left foot debridement. CAM boot intact. CT negative for abscess.  Patient relates an episode of diarrhea and vomiting yesterday that he attributes to a possible change in medication.  He relates does not currently have any nausea, shortness of breath, or chills.        Scheduled Meds:   amLODIPine  10 mg Oral Daily    ascorbic acid (vitamin C)  500 mg Oral BID    atorvastatin  40 mg Oral Daily    carvediloL  25 mg  Oral BID    gabapentin  300 mg Oral BID    heparin (porcine)  5,000 Units Subcutaneous Q8H    hydrALAZINE  50 mg Oral Q8H    insulin aspart U-100  12 Units Subcutaneous TIDWM    insulin detemir U-100  20 Units Subcutaneous QHS    insulin detemir U-100  25 Units Subcutaneous Daily    multivitamin  1 tablet Oral Daily    piperacillin-tazobactam (ZOSYN) IVPB  4.5 g Intravenous Q8H    sodium chloride 0.9%  10 mL Intravenous Q6H     Continuous Infusions:    PRN Meds:acetaminophen, albuterol **AND** MDI QID, benzonatate, dextrose 50%, dextrose 50%, glucagon (human recombinant), glucose, glucose, hydrALAZINE, insulin aspart U-100, ondansetron, simethicone, sodium chloride 0.9%, [place] PICC Protocol **AND** sodium chloride 0.9% **AND** sodium chloride 0.9%    Review of patient's allergies indicates:   Allergen Reactions    Morphine Hallucinations        Past Medical History:   Diagnosis Date    CKD (chronic kidney disease), stage III 07/16/2020    CKD stage 3 due to type 1 diabetes mellitus     CKD stage 3 due to type 2 diabetes mellitus     Diabetes mellitus, type 2     Diabetic foot ulcer associated with diabetes mellitus due to underlying condition 07/16/2020    Diabetic foot ulcers     Hyperlipidemia     Hypertension      Past Surgical History:   Procedure Laterality Date    BONE BIOPSY Left 7/17/2020    Procedure: BIOPSY, BONE;  Surgeon: Verona Whitmore DPM;  Location: [facility] OR;  Service: Podiatry;  Laterality: Left;    CERVICAL DISC SURGERY  07/11/2016    DEBRIDEMENT Left 7/17/2020    Procedure: DEBRIDEMENT;  Surgeon: Verona Whitmore DPM;  Location: [facility] OR;  Service: Podiatry;  Laterality: Left;    left leg orthopedic surgery Left        Family History     Problem Relation (Age of Onset)    Heart disease Father        Tobacco Use    Smoking status: Never Smoker    Smokeless tobacco: Never Used   Substance and Sexual Activity    Alcohol use: Not Currently     Alcohol/week: 0.0 standard  drinks     Comment: social    Drug use: No    Sexual activity: Not on file     Review of Systems   Constitutional: Positive for activity change.   Respiratory: Negative for shortness of breath.    Cardiovascular: Negative for chest pain and leg swelling.   Gastrointestinal: Negative for nausea and vomiting.   Musculoskeletal: Positive for arthralgias.   Neurological: Positive for numbness. Negative for weakness.     Objective:     Vital Signs (Most Recent):  Temp: 97.7 °F (36.5 °C) (07/23/20 1208)  Pulse: 64 (07/23/20 1208)  Resp: 18 (07/23/20 1208)  BP: (!) 146/84 (07/23/20 1208)  SpO2: (!) 94 % (07/23/20 1208) Vital Signs (24h Range):  Temp:  [97.1 °F (36.2 °C)-97.9 °F (36.6 °C)] 97.7 °F (36.5 °C)  Pulse:  [62-70] 64  Resp:  [18-20] 18  SpO2:  [94 %-99 %] 94 %  BP: (146-181)/(82-96) 146/84     Weight: 112 kg (246 lb 14.6 oz)  Body mass index is 32.58 kg/m².    Foot Exam    General  Orientation: alert and oriented to person, place, and time       Right Foot/Ankle     Inspection and Palpation  Tenderness: none   Swelling: none   Skin Exam: no ulcer     Neurovascular  Dorsalis pedis: 1+  Posterior tibial: 1+      Left Foot/Ankle      Inspection and Palpation  Tenderness: metatarsals   Swelling: none   Skin Exam: ulcer;     Neurovascular  Dorsalis pedis: 1+  Posterior tibial: 1+              7/23/20:  Sutures intact skin edges well coapted.   Wound 1: Left plantar foot ulceration   Measurement: 7rny6fdi5.8cm  Base: fibrogranular base and bone  Periwound skin: maceration  Drainage: serous  Probe: probes to bone.             7/22/20:  Sutures intact skin edges well coapted.   Wound 1: Left plantar foot ulceration   Measurement: 3hkm6uxo5.8cm  Base: fibrogranular base and bone  Periwound skin: HPK  Drainage: serous  Probe: probes to bone.             07/21/2020    Sutures intact skin edges well coapted.   Wound 1: Left plantar foot ulceration   Measurement: 0lng2tsy8.8cm  Base: fibrogranular base and bone  Periwound  skin: HPK  Drainage: serous  Probe: probes to bone.           7/20/20:    Pre debridement       Post debridement.             7/19/20:    Sutures intact skin edges well coapted.   Wound 1: Left plantar foot ulceration   Measurement: 8rew4kny64ra  Base: fibrogranular base   Periwound skin: HPK  Drainage: none  Probe: probes to bone.   No purulent drainage noted.           7/18/20:  Wound 1: Left plantar foot ulceration   Measurement: 8hxo5yox91ds  Base: fibrogranular base   Periwound skin: HPK  Drainage: none  Probe: probes to bone.   No purulent drainage noted.                   7/17/20:  Ulcer location: Left  lateral 5th metatarsal base   Signs of infection: +malodor, local edema and erythema  Drainage: Sero-Sanguinous  Purulence: no  Crepitus/fluctuance: no  Periwound: Reddened, Macerated, Calloused  Base: Fibrotic slough  Depth: muscle  Probe to bone: yes                Laboratory:  CBC:   Recent Labs   Lab 07/23/20  0431   WBC 19.73*   RBC 4.02*   HGB 10.7*   HCT 32.6*      MCV 81*   MCH 26.6*   MCHC 32.8     CMP:   Recent Labs   Lab 07/22/20  0508 07/23/20  0431   * 265*   CALCIUM 8.2* 8.2*   ALBUMIN 1.9*  --    PROT 6.4  --    * 129*   K 5.3* 5.3*   CO2 21* 21*   CL 99 101   BUN 94* 93*   CREATININE 3.6* 3.6*   ALKPHOS 310*  --    ALT 37  --    AST 27  --    BILITOT 0.2  --        Diagnostic Results:  Xray: X-Ray Foot Complete Left  Order: [id number]  Status:  Final result   Visible to patient:  No (not released) Next appt:  07/23/2020 at 10:15 AM in Podiatry (Aviva Martinez DPM) Dx:  Type II diabetes mellitus with neurol...  Details    Reading Physician Reading Date Result Priority   Segundo Brewster Jr., MD  [phone] 7/16/2020 Routine      Narrative & Impression     EXAMINATION:  XR FOOT COMPLETE 3 VIEW LEFT     CLINICAL HISTORY:  wound to lateral base of 5th met; Type 2 diabetes mellitus with other diabetic neurological complication     TECHNIQUE:  XR FOOT COMPLETE 3 VIEW  LEFT     COMPARISON:  None     FINDINGS:  There is soft tissue swelling of the midfoot and forefoot with soft tissue ulceration and soft tissue gas adjacent to the base of the 5th metatarsal bone.  There is abnormal sclerosis of the base of the 5th metatarsal shaft which is suspicious for chronic osteomyelitis in light of the adjacent soft tissue ulcer.  Throughout the midfoot there are vague areas of cystic change, there is joint disorganization and loss of normal anatomy suggestive of Charcot arthropathy.  Superimposed septic arthritis and osteomyelitis difficult to exclude with certainty.  There is severe hallux valgus deformity with areas of heterotopic ossification involving the plantar soft tissues of the foot.  Dorsal and plantar calcaneal spurs are present and there are remote fracture deformities of the distal fibula and posterior malleolus.     Impression:     Lateral midfoot soft tissue ulcer and suspected osteomyelitis of the 5th metatarsal bone.     Abnormalities throughout the midfoot which may reflect Charcot arthropathy and or septic arthritis/osteomyelitis     Posttraumatic changes of the distal tibia and fibula and arthritic changes of the 1st MTP joint.                 US Lower Extrem Arteries Bilat with BLANK  Order: [id number]  Status:  Final result   Visible to patient:  No (not released) Next appt:  07/23/2020 at 10:15 AM in Podiatry (Aviva Martinez DPM)  Details    Reading Physician Reading Date Result Priority   Porsha Mcgowan MD  [phone] 7/16/2020 STAT      Narrative & Impression     EXAMINATION:  US ARTERIAL LOWER EXTREMITY BILAT WITH BLANK (XPD)     CLINICAL HISTORY:  Eval for PVD;     TECHNIQUE:  Bilateral ankle-brachial indices as well as bilateral spectral, color and grayscale images of the large arteries of both lower extremities were performed.     COMPARISON:  None     FINDINGS:  ABIs are 0.9 on the right and 0.9 on the left.     Right lower extremity:     Common femoral artery:  96 cm/sec, triphasic     SFA proximal: 76 cm/sec, triphasic     SFA mid: 74 cm/sec, triphasic     SFA distal: 235 cm/sec, triphasic     Popliteal artery: 95 cm/sec, triphasic     Posterior tibial artery: 70 cm/sec     Anterior tibial artery: 46 cm/sec     Peroneal artery: 28 cm/sec     Left lower extremity:     Common femoral artery: 134 cm/sec, triphasic     SFA proximal: 111 cm/sec, triphasic     SFA mid: 98 cm/sec, triphasic     SFA distal: 116 cm/sec, triphasic     Popliteal artery: 93 cm/sec, biphasic     Posterior tibial artery: 106 cm/sec     Anterior tibial artery: 44 cm/sec     Peroneal artery: 78 cm/sec     Impression:     1. ABIs measure 0.9 bilaterally.  2. Greater than doubling of velocities seen between the mid to distal right SFA which may indicate hemodynamically significant stenosis, although normal triphasic waveforms are maintained.  3. Otherwise no additional high-grade stenosis or occlusion seen involving the bilateral lower extremity arteries.              CT: Contains abnormal data CT Foot Without Contrast Left  Order: [id number]  Status:  Final result   Visible to patient:  Yes (Patient Portal and MyChart Bedside) Next appt:  07/30/2020 at 11:20 AM in Family Medicine (Azikiwe K Lombard, MD) Dx:  Other acute osteomyelitis, unspecifie...  Details    Reading Physician Reading Date Result Priority   Angel Gr MD  [phone]  [phone] 7/21/2020 STAT      Narrative & Impression     EXAMINATION:  CT FOOT WITHOUT CONTRAST LEFT     CLINICAL HISTORY:  Osteomyelitis, foot, follow up;  Other acute osteomyelitis, unspecified ankle and foot     TECHNIQUE:  Routine multiplanar CT foot protocol performed without IV contrast.     COMPARISON:  None     FINDINGS:  Soft tissue ulceration noted overlying the proximal 5th metatarsals.  There is adjacent cortical erosion, concerning for osteomyelitis.     There is a extensive degenerative changes throughout the hindfoot and midfoot, likely related to  neuropathic arthropathy.  Muscle atrophy noted throughout the foot.  Tibiotalar joint effusion noted.  Soft tissue edema noted along the dorsal aspect of the foot.     Impression:     Cortical erosion at the base the 5th metatarsal, contiguous with overlying soft tissue ulceration, concerning for osteomyelitis.     This report was flagged in Epic as abnormal.                     Clinical Findings:  Left lateral foot ulceration with exposed bone.     Assessment/Plan:     Active Diagnoses:    Diagnosis Date Noted POA    PRINCIPAL PROBLEM:  Acute osteomyelitis of metatarsal bone of left foot [M86.172] 07/16/2020 Yes    Hyperkalemia [E87.5] 07/22/2020 No    Diabetic ulcer of toe of left foot [E11.621, L97.529] 07/16/2020 Yes    COVID-19 virus infection [U07.1] 07/16/2020 Yes    Hyponatremia [E87.1] 07/16/2020 Yes    Acute renal failure superimposed on stage 3 chronic kidney disease [N17.9, N18.3] 07/16/2020 Yes    Type 2 diabetes mellitus with foot ulcer, without long-term current use of insulin [E11.621, L97.509] 11/08/2019 Yes    Hyperlipidemia, acquired [E78.5] 12/24/2014 Yes    Essential hypertension [I10] 08/26/2014 Yes      Problems Resolved During this Admission:           Education about the prevention of limb loss.    Discussed wound healing cycle, skin integrity, ways to care for skin.Counseled patient on the effects of blood glucose on healing. He verbalizes understanding that it can increase the chances of delayed healing and this prolonged exposure leads to infection or progression of infection which subsequently can result in loss of limb.    The wound is cleansed of foreign material as much as possible and the base inspected for bone or abscess.  No active purulence noted on today's encounter.  Bone still noted to wound bed.      Leukocytosis noted on labs for 3 days.Leukocytosis likely a leukemoid reaction to dexamethasone.  CT ordered on 7/21/20- negative for abscess.     Continue nursing  orders placed for vashe wet to dry BID.     Vascular surgery consulted: per vascular no intervention at this time.    Will apply wound VAC for home at discharge pending clinical course.      Podiatry will follow.    Thank you for your consult. I will follow-up with patient. Please contact us if you have any additional questions.    Aviva Martinez DPM  Podiatry  [facility]

## 2020-07-23 NOTE — ASSESSMENT & PLAN NOTE
-Baseline Cr is 2.8-3.1 and on admit Cr is mildly above baseline at 3.8.  -Could be due to mild diminished appetite vs infection.  -Renally dose medications and avoid nephrotoxic agents  - Improving  - hold lisinopril  - with hyperkalemia-- lokelma x1 on 7/23

## 2020-07-24 VITALS
TEMPERATURE: 98 F | BODY MASS INDEX: 32.73 KG/M2 | HEIGHT: 73 IN | SYSTOLIC BLOOD PRESSURE: 120 MMHG | HEART RATE: 68 BPM | WEIGHT: 246.94 LBS | DIASTOLIC BLOOD PRESSURE: 69 MMHG | OXYGEN SATURATION: 99 % | RESPIRATION RATE: 13 BRPM

## 2020-07-24 DIAGNOSIS — U07.1 COVID-19 VIRUS DETECTED: ICD-10-CM

## 2020-07-24 LAB
ANION GAP SERPL CALC-SCNC: 8 MMOL/L (ref 8–16)
BACTERIA SPEC AEROBE CULT: ABNORMAL
BACTERIA SPEC AEROBE CULT: ABNORMAL
BASOPHILS # BLD AUTO: ABNORMAL K/UL (ref 0–0.2)
BASOPHILS NFR BLD: 0 % (ref 0–1.9)
BUN SERPL-MCNC: 81 MG/DL (ref 6–20)
CALCIUM SERPL-MCNC: 8.1 MG/DL (ref 8.7–10.5)
CHLORIDE SERPL-SCNC: 102 MMOL/L (ref 95–110)
CO2 SERPL-SCNC: 22 MMOL/L (ref 23–29)
CREAT SERPL-MCNC: 3.3 MG/DL (ref 0.5–1.4)
DIFFERENTIAL METHOD: ABNORMAL
EOSINOPHIL # BLD AUTO: ABNORMAL K/UL (ref 0–0.5)
EOSINOPHIL NFR BLD: 0 % (ref 0–8)
ERYTHROCYTE [DISTWIDTH] IN BLOOD BY AUTOMATED COUNT: 12.4 % (ref 11.5–14.5)
EST. GFR  (AFRICAN AMERICAN): 23 ML/MIN/1.73 M^2
EST. GFR  (NON AFRICAN AMERICAN): 20 ML/MIN/1.73 M^2
GLUCOSE SERPL-MCNC: 181 MG/DL (ref 70–110)
HCT VFR BLD AUTO: 33.4 % (ref 40–54)
HGB BLD-MCNC: 10.9 G/DL (ref 14–18)
IMM GRANULOCYTES # BLD AUTO: ABNORMAL K/UL (ref 0–0.04)
IMM GRANULOCYTES NFR BLD AUTO: ABNORMAL % (ref 0–0.5)
LYMPHOCYTES # BLD AUTO: ABNORMAL K/UL (ref 1–4.8)
LYMPHOCYTES NFR BLD: 14 % (ref 18–48)
MCH RBC QN AUTO: 26.5 PG (ref 27–31)
MCHC RBC AUTO-ENTMCNC: 32.6 G/DL (ref 32–36)
MCV RBC AUTO: 81 FL (ref 82–98)
METAMYELOCYTES NFR BLD MANUAL: 2 %
MONOCYTES # BLD AUTO: ABNORMAL K/UL (ref 0.3–1)
MONOCYTES NFR BLD: 6 % (ref 4–15)
MYELOCYTES NFR BLD MANUAL: 3 %
NEUTROPHILS NFR BLD: 74 % (ref 38–73)
NEUTS BAND NFR BLD MANUAL: 1 %
NRBC BLD-RTO: 0 /100 WBC
PLATELET # BLD AUTO: 323 K/UL (ref 150–350)
PMV BLD AUTO: 8.9 FL (ref 9.2–12.9)
POCT GLUCOSE: 210 MG/DL (ref 70–110)
POCT GLUCOSE: 211 MG/DL (ref 70–110)
POTASSIUM SERPL-SCNC: 4.4 MMOL/L (ref 3.5–5.1)
RBC # BLD AUTO: 4.11 M/UL (ref 4.6–6.2)
SODIUM SERPL-SCNC: 132 MMOL/L (ref 136–145)
WBC # BLD AUTO: 13.4 K/UL (ref 3.9–12.7)

## 2020-07-24 PROCEDURE — 99900035 HC TECH TIME PER 15 MIN (STAT)

## 2020-07-24 PROCEDURE — 80048 BASIC METABOLIC PNL TOTAL CA: CPT

## 2020-07-24 PROCEDURE — 25000003 PHARM REV CODE 250: Performed by: HOSPITALIST

## 2020-07-24 PROCEDURE — 85007 BL SMEAR W/DIFF WBC COUNT: CPT

## 2020-07-24 PROCEDURE — 63600175 PHARM REV CODE 636 W HCPCS: Performed by: HOSPITALIST

## 2020-07-24 PROCEDURE — A4216 STERILE WATER/SALINE, 10 ML: HCPCS | Performed by: HOSPITALIST

## 2020-07-24 PROCEDURE — 99233 PR SUBSEQUENT HOSPITAL CARE,LEVL III: ICD-10-PCS | Mod: ,,, | Performed by: PODIATRIST

## 2020-07-24 PROCEDURE — 99233 SBSQ HOSP IP/OBS HIGH 50: CPT | Mod: ,,, | Performed by: PODIATRIST

## 2020-07-24 PROCEDURE — 85027 COMPLETE CBC AUTOMATED: CPT

## 2020-07-24 PROCEDURE — 94761 N-INVAS EAR/PLS OXIMETRY MLT: CPT

## 2020-07-24 RX ORDER — INSULIN ASPART 100 [IU]/ML
10 INJECTION, SOLUTION INTRAVENOUS; SUBCUTANEOUS
Qty: 27 ML | Refills: 3 | Status: ON HOLD | OUTPATIENT
Start: 2020-07-24 | End: 2020-08-07 | Stop reason: SDUPTHER

## 2020-07-24 RX ORDER — HYDRALAZINE HYDROCHLORIDE 50 MG/1
50 TABLET, FILM COATED ORAL EVERY 8 HOURS
Qty: 90 TABLET | Refills: 11 | Status: ON HOLD | OUTPATIENT
Start: 2020-07-24 | End: 2020-08-07 | Stop reason: HOSPADM

## 2020-07-24 RX ADMIN — HYDRALAZINE HYDROCHLORIDE 50 MG: 25 TABLET, FILM COATED ORAL at 01:07

## 2020-07-24 RX ADMIN — INSULIN ASPART 12 UNITS: 100 INJECTION, SOLUTION INTRAVENOUS; SUBCUTANEOUS at 11:07

## 2020-07-24 RX ADMIN — OXYCODONE HYDROCHLORIDE AND ACETAMINOPHEN 500 MG: 500 TABLET ORAL at 08:07

## 2020-07-24 RX ADMIN — Medication 10 ML: at 06:07

## 2020-07-24 RX ADMIN — THERA TABS 1 TABLET: TAB at 08:07

## 2020-07-24 RX ADMIN — CARVEDILOL 25 MG: 12.5 TABLET, FILM COATED ORAL at 08:07

## 2020-07-24 RX ADMIN — PIPERACILLIN AND TAZOBACTAM 4.5 G: 4; .5 INJECTION, POWDER, LYOPHILIZED, FOR SOLUTION INTRAVENOUS; PARENTERAL at 08:07

## 2020-07-24 RX ADMIN — HEPARIN SODIUM 5000 UNITS: 5000 INJECTION INTRAVENOUS; SUBCUTANEOUS at 06:07

## 2020-07-24 RX ADMIN — INSULIN ASPART 12 UNITS: 100 INJECTION, SOLUTION INTRAVENOUS; SUBCUTANEOUS at 08:07

## 2020-07-24 RX ADMIN — HEPARIN SODIUM 5000 UNITS: 5000 INJECTION INTRAVENOUS; SUBCUTANEOUS at 01:07

## 2020-07-24 RX ADMIN — ATORVASTATIN CALCIUM 40 MG: 40 TABLET, FILM COATED ORAL at 08:07

## 2020-07-24 RX ADMIN — INSULIN ASPART 4 UNITS: 100 INJECTION, SOLUTION INTRAVENOUS; SUBCUTANEOUS at 08:07

## 2020-07-24 RX ADMIN — HYDRALAZINE HYDROCHLORIDE 50 MG: 25 TABLET, FILM COATED ORAL at 06:07

## 2020-07-24 RX ADMIN — AMLODIPINE BESYLATE 10 MG: 5 TABLET ORAL at 08:07

## 2020-07-24 RX ADMIN — INSULIN ASPART 4 UNITS: 100 INJECTION, SOLUTION INTRAVENOUS; SUBCUTANEOUS at 11:07

## 2020-07-24 RX ADMIN — GABAPENTIN 300 MG: 300 CAPSULE ORAL at 08:07

## 2020-07-24 RX ADMIN — Medication 10 ML: at 01:07

## 2020-07-24 NOTE — NURSING
Pt rested comfortably in bed with eyes closed though out the shift.NADN.Remained free of falls and injury.  Hourly  checks done. No adverse reactions to antibiotic therapy is noted. Bed low with wheels locked  and alarm on.  Call  bell within reach at all times;Pt able to make needs known to staff.Will continue to monitor and follow treatment plan.

## 2020-07-24 NOTE — PROGRESS NOTES
Progress note     Reviewed the labs and vitals.   Clinically doing better.   No indication to repeat cosyntropin test .   Hyponatremia improved and hyperkalemia resolved.   Blood sugar in 200 range.   Patient will be seen as OP via  televisit in 2-3 weeks.     Thank you for involving me in the care of this patient.  I spend 60 min taking care of the patient. Greater than 50% of time spend face to face explaining disease condition(s), life style modifications, medical management and counseling.           Tara Rosario M.D.  Endocrinology     [address], [address]  [place] [address]  [phone] ([employer])  [phone] (Fax

## 2020-07-24 NOTE — PROGRESS NOTES
Pt/& wife given all discharge instructions f/u & prescription info, ALL of pt's wife's questions addressed, KI PICC in place and to remain r/t home IV abx, LLE CAM boot in place and pt is ambulatory w/it w/o assistance, COVID precautions explained to pt and wife in detail, verbalized understanding, pt getting dressed and awaiting wife at ED

## 2020-07-24 NOTE — DISCHARGE SUMMARY
[facility] Hospital Medicine  Discharge Summary      Patient Name: Catalino Mcfadden  MRN: [id number]  Admission Date: 7/16/2020  Hospital Length of Stay: 8 days  Discharge Date and Time:  07/24/2020 10:12 AM  Attending Physician: Cande Aguilar MD   Discharging Provider: Cande Aguilar MD  Primary Care Provider: Azikiwe K Lombard, MD      HPI:   Mr. Mcfadden is a 55 year old man with diabetes mellitus, diabetic foot ulcers, hypertension, hyperlipidemia and CKDIII who was found to have osteomyelitis on outside imaging and sent in by his podiatrist, Dr. Martinez.  He states that he has had ulcers on his left foot for quite some time and they had been stable.  Then 2.5 weeks ago he noted a blister on his left foot which subsequently popped.  After discussing with his podiatrist he was started on cipro.  At the time, he was in [place], [place] because his father was ill and in the hospital.  His father passed away with complications from covid-19 infection.  Because he was out of town attending to his father's illness, he was unable to follow up with Dr. Martinez until today.  Xray of his foot was obtained and showed osteomyelitis of his left 5th toe.  He noted a malodorous drainage and a mild achey pain.  He denied fevers, chills, nausea, vomiting, diarrhea, lethargy, malaise, shortness of breath or cough.  He notes he drinks at least a gallon of water daily.    Procedure(s) (LRB):  DEBRIDEMENT (Left)  BIOPSY, BONE (Left)      Hospital Course:   55 year old man with history of diabetes mellitus, diabetic foot ulcers, hypertension, hyperlipidemia and CKDIII who was found to have osteomyelitis on outside imaging and sent in by his podiatrist, Dr. Martinez.  He has had bone biopsy/culture and debridement by podiatry.  Wound culture 7/16 growing Prevotella and Myroides; wound culture 7/17 growing Peptostreptococcus. Bone culture 7/17 with Peptostreptococcus.  ID consulted. PICC already in place for outpatient antibiotics.  CT foot 7/21 shows osteomyelitis and soft tissue ulceration but no abscess. On zosyn. Follow up in Podiatry clinic for wound vac placement and with ID.      Also, he has covid - but is completely asymptomatic.  O2 sats got to 94, so he was started on dexamethasone. He has remained off supplemental O2, so dexamethasone was discontinued.     Has mild hyponatremia on fluid restriction (drinks at least a gallon of water daily at home).  Serum cortisol abnormally low at 1.5 on admit. Endocrine consulted and suspect that abnormal ACTH stim test is due to dexamethasone suppressing adrenal axis. His mild acute/chronic renal failure is at baseline. Follow up with PCP and Endocrine.      Consults:   Consults (From admission, onward)        Status Ordering Provider     Inpatient consult to Endocrinology  Once     Provider:  Tara Rosario MD    Completed ROBERTA BAILEY     Inpatient consult to Infectious Diseases  Once     Provider:  Pat Resendiz MD    Completed VERONA WHITMORE     Inpatient consult to PICC team ([facility])  Once     Provider:  (Not yet assigned)    Acknowledged DENVER JOHANSEN     Inpatient consult to Podiatry  Once     Provider:  Verona Whitmore DPM    Completed DENVER JOHANSEN     Inpatient consult to Vascular Surgery  Once     Provider:  Daniel Poon MD    Completed VERONA WHITMORE     Inpatient virtual consult to Hospital Medicine  Once     Provider:  Tereza Lopez MD    Completed DENVER JOHANSEN     Inpatient virtual consult to Hospital Medicine  Once     Provider:  Tereza Lopez MD    Completed ROBERTA BAILEY     Inpatient virtual consult to Hospital Medicine  Once     Provider:  (Not yet assigned)    Acknowledged ROBERTA BAILEY          No new Assessment & Plan notes have been filed under this hospital service since the last note was generated.  Service: Hospital Medicine    Final Active Diagnoses:    Diagnosis Date Noted POA    PRINCIPAL PROBLEM:  Acute osteomyelitis of  metatarsal bone of left foot [M86.172] 07/16/2020 Yes    Hyperkalemia [E87.5] 07/22/2020 No    Diabetic ulcer of toe of left foot [E11.621, L97.529] 07/16/2020 Yes    COVID-19 virus infection [U07.1] 07/16/2020 Yes    Hyponatremia [E87.1] 07/16/2020 Yes    Acute renal failure superimposed on stage 3 chronic kidney disease [N17.9, N18.3] 07/16/2020 Yes    Type 2 diabetes mellitus with foot ulcer, without long-term current use of insulin [E11.621, L97.509] 11/08/2019 Yes    Hyperlipidemia, acquired [E78.5] 12/24/2014 Yes    Essential hypertension [I10] 08/26/2014 Yes      Problems Resolved During this Admission:       Discharged Condition: good    Disposition: Home or Self Care    Follow Up:  Follow-up Information     [facility] - Infectious Disease. Go on 8/12/2020.    Specialty: Infectious Diseases  Why: at 9 am for antibiotic management.  Contact information:  [address] [facility], [address]  [facility] [address] [phone]  Additional information:  [address]               Patient Instructions:      Diet diabetic     Notify your health care provider if you experience any of the following:  temperature >100.4     Notify your health care provider if you experience any of the following:  persistent nausea and vomiting or diarrhea     Notify your health care provider if you experience any of the following:  severe uncontrolled pain     Notify your health care provider if you experience any of the following:  redness, tenderness, or signs of infection (pain, swelling, redness, odor or green/yellow discharge around incision site)     Notify your health care provider if you experience any of the following:  difficulty breathing or increased cough     Notify your health care provider if you experience any of the following:  severe persistent headache     Notify your health care provider if you experience any of the following:  worsening rash     Notify your health care provider if you experience any of the  following:  persistent dizziness, light-headedness, or visual disturbances     Notify your health care provider if you experience any of the following:  increased confusion or weakness     Activity as tolerated       Significant Diagnostic Studies: Labs: All labs within the past 24 hours have been reviewed    Pending Diagnostic Studies:     Procedure Component Value Units Date/Time    Cortisol [[id number]] Collected: 07/22/20 0928    Order Status: Sent Lab Status: In process Updated: 07/22/20 0934    Specimen: Blood          Medications:  Reconciled Home Medications:      Medication List      START taking these medications    carvediloL 25 MG tablet  Commonly known as: COREG  Take 1 tablet (25 mg total) by mouth 2 (two) times daily.     gabapentin 300 MG capsule  Commonly known as: NEURONTIN  Take 1 capsule (300 mg total) by mouth 2 (two) times daily.     hydrALAZINE 50 MG tablet  Commonly known as: APRESOLINE  Take 1 tablet (50 mg total) by mouth every 8 (eight) hours.     insulin aspart U-100 100 unit/mL (3 mL) Inpn pen  Commonly known as: NovoLOG  Inject 10 Units into the skin 3 (three) times daily with meals.     insulin detemir U-100 100 unit/mL (3 mL) Inpn pen  Commonly known as: LEVEMIR FLEXTOUCH  Inject 20 Units into the skin 2 (two) times daily.        CONTINUE taking these medications    amLODIPine 10 MG tablet  Commonly known as: NORVASC  Take 1 tablet (10 mg total) by mouth once daily.     atorvastatin 40 MG tablet  Commonly known as: LIPITOR  Take 1 tablet (40 mg total) by mouth once daily.     blood sugar diagnostic Strp  1 strip by Misc.(Non-Drug; Combo Route) route 3 (three) times daily. Patient needs One Touch Test Strips (Berio).     TRULICITY 0.75 mg/0.5 mL pen injector  Generic drug: dulaglutide  INJECT 0.5 ML UNDER THE SKIN EVERY 7 DAYS        STOP taking these medications    chlorthalidone 25 MG Tab  Commonly known as: HYGROTEN     diltiaZEM 240 MG 24 hr capsule  Commonly known as: CARDIZEM CD      diltiaZEM 360 MG 24 hr capsule  Commonly known as: CARDIZEM CD     lisinopriL 20 MG tablet  Commonly known as: PRINIVIL,ZESTRIL            Indwelling Lines/Drains at time of discharge:   Lines/Drains/Airways     Peripherally Inserted Central Catheter Line            PICC Double Lumen 07/20/20 2020 right basilic 3 days                Time spent on the discharge of patient: 40 minutes  Patient was seen and examined on the date of discharge and determined to be suitable for discharge.         Cande Aguilar MD  Department of Hospital Medicine  [facility]

## 2020-07-24 NOTE — PLAN OF CARE
Problem: Fall Injury Risk  Goal: Absence of Fall and Fall-Related Injury  Outcome: Ongoing, Progressing     Problem: Adult Inpatient Plan of Care  Goal: Plan of Care Review  Outcome: Ongoing, Progressing  Goal: Patient-Specific Goal (Individualization)  Outcome: Ongoing, Progressing  Goal: Absence of Hospital-Acquired Illness or Injury  Outcome: Ongoing, Progressing  Goal: Optimal Comfort and Wellbeing  Outcome: Ongoing, Progressing  Goal: Readiness for Transition of Care  Outcome: Ongoing, Progressing  Goal: Rounds/Family Conference  Outcome: Ongoing, Progressing     Problem: Diabetes Comorbidity  Goal: Blood Glucose Level Within Desired Range  Outcome: Ongoing, Progressing  Intervention: Maintain Glycemic Control  Flowsheets (Taken 7/24/2020 0506)  Glycemic Management:   blood glucose monitoring   carbohydrate replacement provided   insulin infusion adjusted     Problem: Electrolyte Imbalance (Acute Kidney Injury/Impairment)  Goal: Serum Electrolyte Balance  Outcome: Ongoing, Progressing     Problem: Fluid Imbalance (Acute Kidney Injury/Impairment)  Goal: Optimal Fluid Balance  Outcome: Ongoing, Progressing     Problem: Hematologic Alteration (Acute Kidney Injury/Impairment)  Goal: Hemoglobin, Hematocrit and Platelets Within Normal Range  Outcome: Ongoing, Progressing     Problem: Oral Intake Inadequate (Acute Kidney Injury/Impairment)  Goal: Optimal Nutrition Intake  Outcome: Ongoing, Progressing     Problem: Renal Function Impairment (Acute Kidney Injury/Impairment)  Goal: Effective Renal Function  Outcome: Ongoing, Progressing     Problem: Infection  Goal: Infection Symptom Resolution  Outcome: Ongoing, Progressing  Intervention: Prevent or Manage Infection  Flowsheets (Taken 7/24/2020 0506)  Isolation Precautions:   airborne precautions maintained   contact precautions maintained   droplet precautions maintained   protective environment maintained     Problem: Skin Injury Risk Increased  Goal: Skin Health  and Integrity  Outcome: Ongoing, Progressing  Intervention: Optimize Skin Protection  Flowsheets (Taken 7/24/2020 5449)  Pressure Reduction Techniques:   positioned off wounds   frequent weight shift encouraged   heels elevated off bed  Skin Protection:   electrode sites changed   protective footwear used  Head of Bed (HOB): HOB elevated

## 2020-07-24 NOTE — PROGRESS NOTES
Pt's wife given update on POC, wife was requesting COVID before discharge per Dr Partida it's not allowed per institution protocol, tried to call wife back x2 no answer

## 2020-07-24 NOTE — PROGRESS NOTES
[facility]  Podiatry  Progress Note    Patient Name: Catalino Mcfadden  MRN: [id number]  Admission Date: 7/16/2020  Hospital Length of Stay: 8 days  Attending Physician: Cande Aguilar MD  Primary Care Provider: Azikiwe K Lombard, MD     Subjective:     History of Present Illness: 56 y/o male PMH DM2, CKD admitted for left foot infection. Reports left foot ulceration x 3 weeks, thinks this started due to rubbing of brace on the lateral foot. Pt. Followed by Dr. Martinez per chart review contacted the office on 6/29/20 stating that he was heading out of town to [place] to check on his father. Rx. Cipro then sent to pharmacy which patient reports taking. He returned to [place] yesterday and was seen by . Xray left foot ordered revealing OM, gas forming infection. Sent to ED for admission. Denies N/V/F. Reports some chills.     7/18/20: S/p one day left foot debridement, bone biopsy. Denies pedal pain. CAM boot intact left foot.     7/19/20: S/p 2 days left foot debridement, bone biopsy. CAM boot in place left foot.     7/20/20: Patient seen bedside s/p 3 days left foot debridement. CAM boot intact.     07/21/2020 Patient seen bedside s/p 4 days left foot debridement. CAM boot intact.     7/22/20: Patient seen bedside s/p 5 days left foot debridement. CAM boot intact. CT negative for abscess. Reports bandages were not changed yesterday evening. Reports receiving bandage change once yesterday. Current wound care orders are BID dressing change.     07/23/2020 Patient seen bedside s/p 6 days left foot debridement. CAM boot intact. CT negative for abscess.  Patient relates an episode of diarrhea and vomiting yesterday that he attributes to a possible change in medication.  He relates does not currently have any nausea, shortness of breath, or chills.    07/24/2020 Patient seen bedside s/p 7 days left foot debridement. CAM boot intact.  He denies nausea, shortness of breath, or chills. He is requesting repeat  testing for COVID 19          Scheduled Meds:   amLODIPine  10 mg Oral Daily    ascorbic acid (vitamin C)  500 mg Oral BID    atorvastatin  40 mg Oral Daily    carvediloL  25 mg Oral BID    gabapentin  300 mg Oral BID    heparin (porcine)  5,000 Units Subcutaneous Q8H    hydrALAZINE  50 mg Oral Q8H    insulin aspart U-100  12 Units Subcutaneous TIDWM    insulin detemir U-100  20 Units Subcutaneous QHS    insulin detemir U-100  25 Units Subcutaneous Daily    multivitamin  1 tablet Oral Daily    piperacillin-tazobactam (ZOSYN) IVPB  4.5 g Intravenous Q8H    sodium chloride 0.9%  10 mL Intravenous Q6H     Continuous Infusions:    PRN Meds:acetaminophen, albuterol **AND** MDI QID, benzonatate, dextrose 50%, dextrose 50%, glucagon (human recombinant), glucose, glucose, hydrALAZINE, insulin aspart U-100, ondansetron, simethicone, sodium chloride 0.9%, [place] PICC Protocol **AND** sodium chloride 0.9% **AND** sodium chloride 0.9%    Review of patient's allergies indicates:   Allergen Reactions    Morphine Hallucinations        Past Medical History:   Diagnosis Date    CKD (chronic kidney disease), stage III 07/16/2020    CKD stage 3 due to type 1 diabetes mellitus     CKD stage 3 due to type 2 diabetes mellitus     Diabetes mellitus, type 2     Diabetic foot ulcer associated with diabetes mellitus due to underlying condition 07/16/2020    Diabetic foot ulcers     Hyperlipidemia     Hypertension      Past Surgical History:   Procedure Laterality Date    BONE BIOPSY Left 7/17/2020    Procedure: BIOPSY, BONE;  Surgeon: Verona Whitmore DPM;  Location: [facility] OR;  Service: Podiatry;  Laterality: Left;    CERVICAL DISC SURGERY  07/11/2016    DEBRIDEMENT Left 7/17/2020    Procedure: DEBRIDEMENT;  Surgeon: Verona Whitmore DPM;  Location: [facility] OR;  Service: Podiatry;  Laterality: Left;    left leg orthopedic surgery Left        Family History     Problem Relation (Age of Onset)    Heart disease Father         Tobacco Use    Smoking status: Never Smoker    Smokeless tobacco: Never Used   Substance and Sexual Activity    Alcohol use: Not Currently     Alcohol/week: 0.0 standard drinks     Comment: social    Drug use: No    Sexual activity: Not on file     Review of Systems   Constitutional: Positive for activity change.   Respiratory: Negative for shortness of breath.    Cardiovascular: Negative for chest pain and leg swelling.   Gastrointestinal: Negative for nausea and vomiting.   Musculoskeletal: Positive for arthralgias.   Neurological: Positive for numbness. Negative for weakness.     Objective:     Vital Signs (Most Recent):  Temp: 97.7 °F (36.5 °C) (07/24/20 1110)  Pulse: 68 (07/24/20 1110)  Resp: 13 (07/24/20 1110)  BP: 120/69 (07/24/20 1110)  SpO2: 99 % (07/24/20 1110) Vital Signs (24h Range):  Temp:  [97.3 °F (36.3 °C)-97.8 °F (36.6 °C)] 97.7 °F (36.5 °C)  Pulse:  [61-75] 68  Resp:  [13-18] 13  SpO2:  [95 %-99 %] 99 %  BP: (120-142)/(66-85) 120/69     Weight: 112 kg (246 lb 14.6 oz)  Body mass index is 32.58 kg/m².    Foot Exam    General  Orientation: alert and oriented to person, place, and time       Right Foot/Ankle     Inspection and Palpation  Tenderness: none   Swelling: none   Skin Exam: no ulcer     Neurovascular  Dorsalis pedis: 1+  Posterior tibial: 1+      Left Foot/Ankle      Inspection and Palpation  Tenderness: metatarsals   Swelling: none   Skin Exam: ulcer;     Neurovascular  Dorsalis pedis: 1+  Posterior tibial: 1+            07/24/2020 7/23/20:  Sutures intact skin edges well coapted.   Wound 1: Left plantar foot ulceration   Measurement: 6cmx5.2cmx0.8cm  Base: fibrogranular base and bone  Periwound skin: maceration  Drainage: serous  Probe: probes to bone.             7/23/20:  Sutures intact skin edges well coapted.   Wound 1: Left plantar foot ulceration   Measurement: 3muv8tol5.8cm  Base: fibrogranular base and bone  Periwound skin: maceration  Drainage: serous  Probe: probes to  bone.             7/22/20:  Sutures intact skin edges well coapted.   Wound 1: Left plantar foot ulceration   Measurement: 0rvp8muf3.8cm  Base: fibrogranular base and bone  Periwound skin: HPK  Drainage: serous  Probe: probes to bone.             07/21/2020    Sutures intact skin edges well coapted.   Wound 1: Left plantar foot ulceration   Measurement: 4vro0amx8.8cm  Base: fibrogranular base and bone  Periwound skin: HPK  Drainage: serous  Probe: probes to bone.           7/20/20:    Pre debridement       Post debridement.             7/19/20:    Sutures intact skin edges well coapted.   Wound 1: Left plantar foot ulceration   Measurement: 5bug0qld13nl  Base: fibrogranular base   Periwound skin: HPK  Drainage: none  Probe: probes to bone.   No purulent drainage noted.           7/18/20:  Wound 1: Left plantar foot ulceration   Measurement: 6xtx6atx16mv  Base: fibrogranular base   Periwound skin: HPK  Drainage: none  Probe: probes to bone.   No purulent drainage noted.                   7/17/20:  Ulcer location: Left  lateral 5th metatarsal base   Signs of infection: +malodor, local edema and erythema  Drainage: Sero-Sanguinous  Purulence: no  Crepitus/fluctuance: no  Periwound: Reddened, Macerated, Calloused  Base: Fibrotic slough  Depth: muscle  Probe to bone: yes                Laboratory:  CBC:   Recent Labs   Lab 07/24/20  0646   WBC 13.40*   RBC 4.11*   HGB 10.9*   HCT 33.4*      MCV 81*   MCH 26.5*   MCHC 32.6     CMP:   Recent Labs   Lab 07/22/20  0508  07/24/20  0646   *   < > 181*   CALCIUM 8.2*   < > 8.1*   ALBUMIN 1.9*  --   --    PROT 6.4  --   --    *   < > 132*   K 5.3*   < > 4.4   CO2 21*   < > 22*   CL 99   < > 102   BUN 94*   < > 81*   CREATININE 3.6*   < > 3.3*   ALKPHOS 310*  --   --    ALT 37  --   --    AST 27  --   --    BILITOT 0.2  --   --     < > = values in this interval not displayed.       Diagnostic Results:  Xray: X-Ray Foot Complete Left  Order: [id number]  Status:   Final result   Visible to patient:  No (not released) Next appt:  07/23/2020 at 10:15 AM in Podiatry (Aviva Martinez DPM) Dx:  Type II diabetes mellitus with neurol...  Details    Reading Physician Reading Date Result Priority   Segundo Brewster Jr., MD  [phone] 7/16/2020 Routine      Narrative & Impression     EXAMINATION:  XR FOOT COMPLETE 3 VIEW LEFT     CLINICAL HISTORY:  wound to lateral base of 5th met; Type 2 diabetes mellitus with other diabetic neurological complication     TECHNIQUE:  XR FOOT COMPLETE 3 VIEW LEFT     COMPARISON:  None     FINDINGS:  There is soft tissue swelling of the midfoot and forefoot with soft tissue ulceration and soft tissue gas adjacent to the base of the 5th metatarsal bone.  There is abnormal sclerosis of the base of the 5th metatarsal shaft which is suspicious for chronic osteomyelitis in light of the adjacent soft tissue ulcer.  Throughout the midfoot there are vague areas of cystic change, there is joint disorganization and loss of normal anatomy suggestive of Charcot arthropathy.  Superimposed septic arthritis and osteomyelitis difficult to exclude with certainty.  There is severe hallux valgus deformity with areas of heterotopic ossification involving the plantar soft tissues of the foot.  Dorsal and plantar calcaneal spurs are present and there are remote fracture deformities of the distal fibula and posterior malleolus.     Impression:     Lateral midfoot soft tissue ulcer and suspected osteomyelitis of the 5th metatarsal bone.     Abnormalities throughout the midfoot which may reflect Charcot arthropathy and or septic arthritis/osteomyelitis     Posttraumatic changes of the distal tibia and fibula and arthritic changes of the 1st MTP joint.                 US Lower Extrem Arteries Bilat with BLANK  Order: [id number]  Status:  Final result   Visible to patient:  No (not released) Next appt:  07/23/2020 at 10:15 AM in Podiatry (Aviva Martinez DPM)  Details    Reading  Physician Reading Date Result Priority   Porsha Mcgowan MD  [phone] 7/16/2020 STAT      Narrative & Impression     EXAMINATION:  US ARTERIAL LOWER EXTREMITY BILAT WITH BLANK (XPD)     CLINICAL HISTORY:  Eval for PVD;     TECHNIQUE:  Bilateral ankle-brachial indices as well as bilateral spectral, color and grayscale images of the large arteries of both lower extremities were performed.     COMPARISON:  None     FINDINGS:  ABIs are 0.9 on the right and 0.9 on the left.     Right lower extremity:     Common femoral artery: 96 cm/sec, triphasic     SFA proximal: 76 cm/sec, triphasic     SFA mid: 74 cm/sec, triphasic     SFA distal: 235 cm/sec, triphasic     Popliteal artery: 95 cm/sec, triphasic     Posterior tibial artery: 70 cm/sec     Anterior tibial artery: 46 cm/sec     Peroneal artery: 28 cm/sec     Left lower extremity:     Common femoral artery: 134 cm/sec, triphasic     SFA proximal: 111 cm/sec, triphasic     SFA mid: 98 cm/sec, triphasic     SFA distal: 116 cm/sec, triphasic     Popliteal artery: 93 cm/sec, biphasic     Posterior tibial artery: 106 cm/sec     Anterior tibial artery: 44 cm/sec     Peroneal artery: 78 cm/sec     Impression:     1. ABIs measure 0.9 bilaterally.  2. Greater than doubling of velocities seen between the mid to distal right SFA which may indicate hemodynamically significant stenosis, although normal triphasic waveforms are maintained.  3. Otherwise no additional high-grade stenosis or occlusion seen involving the bilateral lower extremity arteries.              CT: Contains abnormal data CT Foot Without Contrast Left  Order: [id number]  Status:  Final result   Visible to patient:  Yes (Patient Portal and MyChart Bedside) Next appt:  07/30/2020 at 11:20 AM in Family Medicine (Azikiwe K Lombard, MD) Dx:  Other acute osteomyelitis, unspecifie...  Details    Reading Physician Reading Date Result Priority   Angel Gr MD  [phone] 7/21/2020 STAT      Narrative  & Impression     EXAMINATION:  CT FOOT WITHOUT CONTRAST LEFT     CLINICAL HISTORY:  Osteomyelitis, foot, follow up;  Other acute osteomyelitis, unspecified ankle and foot     TECHNIQUE:  Routine multiplanar CT foot protocol performed without IV contrast.     COMPARISON:  None     FINDINGS:  Soft tissue ulceration noted overlying the proximal 5th metatarsals.  There is adjacent cortical erosion, concerning for osteomyelitis.     There is a extensive degenerative changes throughout the hindfoot and midfoot, likely related to neuropathic arthropathy.  Muscle atrophy noted throughout the foot.  Tibiotalar joint effusion noted.  Soft tissue edema noted along the dorsal aspect of the foot.     Impression:     Cortical erosion at the base the 5th metatarsal, contiguous with overlying soft tissue ulceration, concerning for osteomyelitis.     This report was flagged in Epic as abnormal.                     Clinical Findings:  Left lateral foot ulceration with exposed bone.     Assessment/Plan:     Active Diagnoses:    Diagnosis Date Noted POA    PRINCIPAL PROBLEM:  Acute osteomyelitis of metatarsal bone of left foot [M86.172] 07/16/2020 Yes    Hyperkalemia [E87.5] 07/22/2020 No    Diabetic ulcer of toe of left foot [E11.621, L97.529] 07/16/2020 Yes    COVID-19 virus infection [U07.1] 07/16/2020 Yes    Hyponatremia [E87.1] 07/16/2020 Yes    Acute renal failure superimposed on stage 3 chronic kidney disease [N17.9, N18.3] 07/16/2020 Yes    Type 2 diabetes mellitus with foot ulcer, without long-term current use of insulin [E11.621, L97.509] 11/08/2019 Yes    Hyperlipidemia, acquired [E78.5] 12/24/2014 Yes    Essential hypertension [I10] 08/26/2014 Yes      Problems Resolved During this Admission:           Education about the prevention of limb loss.    Discussed wound healing cycle, skin integrity, ways to care for skin.Counseled patient on the effects of blood glucose on healing. He verbalizes understanding that it  can increase the chances of delayed healing and this prolonged exposure leads to infection or progression of infection which subsequently can result in loss of limb.    The wound is cleansed of foreign material as much as possible and the base inspected for bone or abscess.  No active purulence noted on today's encounter.  Bone still noted to wound bed.      Leukocytosis noted on labs likely a leukemoid reaction to dexamethasone.  trending downward today. CT ordered on 7/21/20- negative for abscess.     Vascular surgery consulted: per vascular no intervention at this time.    Although patient would benefit from wound VAC, it can be ordered and applied outpatient.  Wound clinic consult placed for discharge within 5 days if possible      Due to coronavirus he may not be able to immediately present to clinic therefore HH orders should be as follows    HH Wound Care Orders  Change dressing twice weekly within 48 hrs of discharge  1. Surgical scrub to lower leg and foot with Hibiclens evaluate for acute changes (purulence, increased redness/swelling, increased drainage, malodor, increased pain, pallor, necrosis) please contact physician on any acute changes  2. Pat completely dry  3. iodosorb preferably, collagen with silver alternatively to the wound of the left foot  4. Compressive wrap such as coflex to the leg    Limited ambulation with pressure to heel.     Podiatry will follow.    Thank you for your consult. I will follow-up with patient. Please contact us if you have any additional questions.    Aviva Martinez DPM  Podiatry  [facility]

## 2020-07-24 NOTE — PLAN OF CARE
EDUCATION:  Things You are responsible For To Manage Your Care At Home:  1.    Getting your prescriptions filled   2.    Taking your medications as directed, DO NOT MISS ANY DOSES!  3.    Going to your follow-up doctor appointment. This is important because it  allow the doctor to monitor your progress and determine if  any changes need to made to your treatment plan.  Call [facility] number for new or repeated problems / symptoms   Call 911 for CP and / or SOB / Temp > 101  TN discussed with wife via phone who verbalized understanding     07/24/20 8582   Final Note   Assessment Type Final Discharge Note   Anticipated Discharge Disposition [facility]   Hospital Follow Up  Appt(s) scheduled? Yes   Discharge plans and expectations educations in teach back method with documentation complete? Yes   Right Care Referral Info   Post Acute Recommendation Home-care   Referral Type    Facility Name [facility]   Post-Acute Status   Post-Acute Authorization Home [facility] Status Set-up Complete   Discharge Delays None known at this time   Matilde Song notified that all CM needs are met

## 2020-07-24 NOTE — PLAN OF CARE
Problem: Fall Injury Risk  Goal: Absence of Fall and Fall-Related Injury  Outcome: Met     Problem: Adult Inpatient Plan of Care  Goal: Plan of Care Review  Outcome: Met  Goal: Patient-Specific Goal (Individualization)  Outcome: Met  Goal: Absence of Hospital-Acquired Illness or Injury  Outcome: Met  Goal: Optimal Comfort and Wellbeing  Outcome: Met  Goal: Readiness for Transition of Care  Outcome: Met  Goal: Rounds/Family Conference  Outcome: Met     Problem: Diabetes Comorbidity  Goal: Blood Glucose Level Within Desired Range  Outcome: Met     Problem: Electrolyte Imbalance (Acute Kidney Injury/Impairment)  Goal: Serum Electrolyte Balance  Outcome: Met     Problem: Fluid Imbalance (Acute Kidney Injury/Impairment)  Goal: Optimal Fluid Balance  Outcome: Met     Problem: Hematologic Alteration (Acute Kidney Injury/Impairment)  Goal: Hemoglobin, Hematocrit and Platelets Within Normal Range  Outcome: Met     Problem: Oral Intake Inadequate (Acute Kidney Injury/Impairment)  Goal: Optimal Nutrition Intake  Outcome: Met     Problem: Renal Function Impairment (Acute Kidney Injury/Impairment)  Goal: Effective Renal Function  Outcome: Met     Problem: Infection  Goal: Infection Symptom Resolution  Outcome: Met     Problem: Skin Injury Risk Increased  Goal: Skin Health and Integrity  Outcome: Met

## 2020-07-24 NOTE — PLAN OF CARE
[facility] Hospital Medicine  Telemedicine Consult Note      Unable to accept this patient to [facility] at this time due to equipment constraints.     Gloria Ma MD  Department of Hospital Medicine   [facility]

## 2020-07-25 ENCOUNTER — TELEPHONE (OUTPATIENT)
Dept: HEPATOLOGY | Facility: HOSPITAL | Age: 55
End: 2020-07-25

## 2020-07-25 RX ORDER — BLOOD SUGAR DIAGNOSTIC
1 STRIP MISCELLANEOUS
Qty: 100 EACH | Refills: 10 | Status: SHIPPED | OUTPATIENT
Start: 2020-07-25 | End: 2022-02-23 | Stop reason: ALTCHOICE

## 2020-07-25 NOTE — TELEPHONE ENCOUNTER
Notified by nurse Negar that patient's wife called this AM saying that he did not have any insulin pen needles. Called Mr Mcfadden. He says that his wife was able to get a few needles but needs more. He asked me to call his wife Gladys. Called Mrs Mcfadden. She had no further questions. Pen needles e-prescribed to patient's pharmacy.     Cande Aguilar MD  07/25/2020  8:52 AM

## 2020-07-27 ENCOUNTER — TELEPHONE (OUTPATIENT)
Dept: FAMILY MEDICINE | Facility: CLINIC | Age: 55
End: 2020-07-27

## 2020-07-27 ENCOUNTER — PATIENT OUTREACH (OUTPATIENT)
Dept: ADMINISTRATIVE | Facility: CLINIC | Age: 55
End: 2020-07-27

## 2020-07-27 NOTE — PATIENT INSTRUCTIONS
Wound Check After Surgery, No Complication  Surgery involves cutting through layers of skin, fatty tissue, muscle, and sometimes bone and cartilage. Sutures (stitches) or staples are used to close all layers of the wound. The sutures on the inside will dissolve in about 2 to 3 weeks. Any sutures or staples used on the outside need to be removed in about 7 to 14 days, depending on the location.  It is normal to have some clear or bloody discharge on the wound covering or bandage (dressing) for the first few days after surgery. If your wound was sutured (sewn) closed, you should not have to change the dressing more than twice a day in the first few days. Bleeding or discharge requiring more frequent dressing changes can be a sign of a problem.  It is normal to feel pain at the incision site. The pain decreases as the wound heals. Most of the pain and soreness from the skin incision should go away by the time the sutures or staples are removed. Soreness and pain from deeper tissues may last another week or two.  Pain that continues more than a few weeks after surgery or pain that worsens anytime after surgery can be a sign of a problem, such as:  · Infection  · Separation of wound edges  · Collection of blood or other below the skin  Home care  Different types of surgery require different types of care and dressing changes. It is important to follow all instructions and advice from your surgeon, as well as other members of your healthcare team.  Wound care  · Keep the wound clean, as directed by your healthcare provider.  · Change the dressing as directed. Change the dressing sooner if it becomes wet or stained with blood or fluid from the wound.  · Bathe with a sponge (no shower or tub baths) for the first few days after surgery, or until there is no more drainage from the wound. Unless you received different instructions from your surgeon, you can then shower. Do not soak the area in water (no baths or swimming)  until the tape, sutures, or staples are removed and any wound opening has dried out and healed.  Changing the dressing  · Wash your hands before changing the dressings.  · Carefully remove the dressing and tape; dont just yank it off. If it sticks to the wound, you may need to wet it a little to remove it, unless your healthcare provider told you not to wet it.  · Wash your hands again before putting on a new, clean dressing.  · Gently clean the wound with clean water (or saline) using gauze or a clean washcloth. Do not rub it or pick at it.  · Do not use soap, alcohol, hydrogen peroxide, or any other cleanser.  · If you were told to dry the wound before putting on a new dressing, gently pat it dry. Do not rub.  · Throw out the old dressing. Do not reuse it!  · Wash your hands again when you are done.  Types of dressings  Your healthcare team will tell you what type of dressing to put on your wound. Follow your healthcare teams instructions carefully, and contact them if you have any questions. Two common types of dressings are described below. You may have one of these or another type.  · Dry dressing. Use dry gauze. If the wound is still draining, use a nonadherent dressing, which shouldnt stick to the wound.  · Wet-to-dry dressing. Wet the gauze, and squeeze out the excess water (or saline), before putting it on. Then, cover this with a dry pad.  Medicines  · If you were given antibiotics, take them until they are used up or your healthcare provider tells you to stop. It is important to finish the antibiotics even though you feel better, to make sure the infection has cleared.  · You can take acetaminophen or ibuprofen for pain, unless you were given a different pain medicine to use. (Note: If you have chronic liver or kidney disease, or have ever had a stomach ulcer or gastrointestinal bleeding, or are taking blood thinner medicines, talk with your healthcare provider before using these  medicines.)  · Aspirin should never be used in anyone under 18 years of age who is ill with a fever. It may cause severe liver damage.  Follow-up care  Follow up with your healthcare provider, or as advised, for your next wound check or removal of your sutures, staples, or tape.  · If a culture was done, you will be notified if the results will affect your treatment. You can call as directed for the results.  · If imaging tests, such as X-rays, an ultrasound, or CT scan were done, they will be reviewed by a specialist. You will be notified of the results, especially if they affect treatment.  Call 911  Call emergency services right away if any of these occur:  · Trouble breathing or swallowing, wheezing  · Hoarse voice or trouble speaking  · Extreme confusion  · Extreme drowsiness or trouble awakening  · Fainting or loss of consciousness  · Rapid heart rate or very slow heart rate  · Vomiting blood, or large amounts of blood in stool  · Discomfort in the center of the chest that feels like pressure, squeezing, a sense of fullness, or pain.  · Discomfort or pain in other upper body areas, such as the back, one or both arms, neck, jaw, or stomach  · Stroke symptoms (spot a stroke FAST)  ¨ F: Face drooping. One side of the face is numb or droops.  ¨ A: Arm weakness. One arm feels weak or numb.  ¨ S: Speech difficulty: Speech is slurred, or the person is unable to speak.  ¨ T: Time to call 911. Even if symptoms go away, call 911.  When to seek medical advice  Call your healthcare provider right away if any of the following occur:  · Increasing pain at the site of surgery  · Fever over 100.4º F (38º C)  · Redness around the wound  · Fluid, pus, or blood draining from the wound  · Vomiting, constipation, or diarrhea  Date Last Reviewed: 9/27/2015  © 5207-0773 [employer]. [address] [place] [address], [place] [address]. All rights reserved. This information is not intended as a substitute for professional  medical care. Always follow your healthcare professional's instructions.

## 2020-07-27 NOTE — TELEPHONE ENCOUNTER
----- Message from Sherry Jhaveri sent at 7/27/2020  2:53 PM CDT -----  Contact: Isabelle with [facility]  Name of Who is Calling: Isabelle with [facility]      What is the request in detail: Isabelle requesting to speak with nurse regarding pt labs       Can the clinic reply by [facility]: no      What Number to Call Back if not in [facility]: [phone]

## 2020-07-27 NOTE — TELEPHONE ENCOUNTER
Returned home health ph call asking for antibody test was told Dr. Lombard did not order the test , Mrs Walton verbalized understand .

## 2020-07-28 ENCOUNTER — LAB VISIT (OUTPATIENT)
Dept: LAB | Facility: HOSPITAL | Age: 55
End: 2020-07-28
Attending: INTERNAL MEDICINE
Payer: COMMERCIAL

## 2020-07-28 ENCOUNTER — TELEPHONE (OUTPATIENT)
Dept: FAMILY MEDICINE | Facility: CLINIC | Age: 55
End: 2020-07-28

## 2020-07-28 ENCOUNTER — TELEPHONE (OUTPATIENT)
Dept: PODIATRY | Facility: CLINIC | Age: 55
End: 2020-07-28

## 2020-07-28 DIAGNOSIS — M86.172 ACUTE OSTEOMYELITIS OF LEFT ANKLE OR FOOT: Primary | ICD-10-CM

## 2020-07-28 LAB
ALBUMIN SERPL BCP-MCNC: 2.1 G/DL (ref 3.5–5.2)
ALP SERPL-CCNC: 243 U/L (ref 55–135)
ALT SERPL W/O P-5'-P-CCNC: 55 U/L (ref 10–44)
ANION GAP SERPL CALC-SCNC: 7 MMOL/L (ref 8–16)
AST SERPL-CCNC: 29 U/L (ref 10–40)
BASOPHILS # BLD AUTO: 0.01 K/UL (ref 0–0.2)
BASOPHILS NFR BLD: 0.1 % (ref 0–1.9)
BILIRUB SERPL-MCNC: 0.3 MG/DL (ref 0.1–1)
BUN SERPL-MCNC: 50 MG/DL (ref 6–20)
CALCIUM SERPL-MCNC: 8.1 MG/DL (ref 8.7–10.5)
CHLORIDE SERPL-SCNC: 106 MMOL/L (ref 95–110)
CO2 SERPL-SCNC: 21 MMOL/L (ref 23–29)
CREAT SERPL-MCNC: 3.5 MG/DL (ref 0.5–1.4)
CRP SERPL-MCNC: 28.8 MG/L (ref 0–8.2)
DIFFERENTIAL METHOD: ABNORMAL
EOSINOPHIL # BLD AUTO: 0.2 K/UL (ref 0–0.5)
EOSINOPHIL NFR BLD: 2.3 % (ref 0–8)
ERYTHROCYTE [DISTWIDTH] IN BLOOD BY AUTOMATED COUNT: 13 % (ref 11.5–14.5)
ERYTHROCYTE [SEDIMENTATION RATE] IN BLOOD BY WESTERGREN METHOD: 102 MM/HR (ref 0–10)
EST. GFR  (AFRICAN AMERICAN): 21 ML/MIN/1.73 M^2
EST. GFR  (NON AFRICAN AMERICAN): 18 ML/MIN/1.73 M^2
GLUCOSE SERPL-MCNC: 165 MG/DL (ref 70–110)
HCT VFR BLD AUTO: 30.2 % (ref 40–54)
HGB BLD-MCNC: 9.4 G/DL (ref 14–18)
IMM GRANULOCYTES # BLD AUTO: 0.3 K/UL (ref 0–0.04)
IMM GRANULOCYTES NFR BLD AUTO: 2.9 % (ref 0–0.5)
LYMPHOCYTES # BLD AUTO: 1 K/UL (ref 1–4.8)
LYMPHOCYTES NFR BLD: 9.4 % (ref 18–48)
MCH RBC QN AUTO: 26.6 PG (ref 27–31)
MCHC RBC AUTO-ENTMCNC: 31.1 G/DL (ref 32–36)
MCV RBC AUTO: 85 FL (ref 82–98)
MONOCYTES # BLD AUTO: 1.1 K/UL (ref 0.3–1)
MONOCYTES NFR BLD: 10.9 % (ref 4–15)
NEUTROPHILS # BLD AUTO: 7.6 K/UL (ref 1.8–7.7)
NEUTROPHILS NFR BLD: 74.4 % (ref 38–73)
NRBC BLD-RTO: 0 /100 WBC
PLATELET # BLD AUTO: 247 K/UL (ref 150–350)
PMV BLD AUTO: 9.1 FL (ref 9.2–12.9)
POTASSIUM SERPL-SCNC: 5 MMOL/L (ref 3.5–5.1)
PROT SERPL-MCNC: 6.2 G/DL (ref 6–8.4)
RBC # BLD AUTO: 3.54 M/UL (ref 4.6–6.2)
SODIUM SERPL-SCNC: 134 MMOL/L (ref 136–145)
WBC # BLD AUTO: 10.24 K/UL (ref 3.9–12.7)

## 2020-07-28 PROCEDURE — 86140 C-REACTIVE PROTEIN: CPT

## 2020-07-28 PROCEDURE — 36415 COLL VENOUS BLD VENIPUNCTURE: CPT

## 2020-07-28 PROCEDURE — 85025 COMPLETE CBC W/AUTO DIFF WBC: CPT

## 2020-07-28 PROCEDURE — 80053 COMPREHEN METABOLIC PANEL: CPT

## 2020-07-28 PROCEDURE — 85652 RBC SED RATE AUTOMATED: CPT

## 2020-07-28 NOTE — TELEPHONE ENCOUNTER
Informed pt's wife that pt can keep his appointment. But insure that he is wearing a mask, per PCP. Verbalizes understanding.

## 2020-07-28 NOTE — TELEPHONE ENCOUNTER
----- Message from Yohana Blake sent at 7/28/2020 11:36 AM CDT -----  Contact: Isabelle DELCID  Type: Patient Call Back    Who called: Isabelle nasreen DELCID    What is the request in detail: Isabelle nasreen DELCID is requesting a call back. She states that patient was just admitted to  and she would like to know if she can get new wound care orders.   Please advise.    Can the clinic reply by [facility]? No    Best call back number: [phone] fax# [phone]    Additional Information: N/A

## 2020-07-28 NOTE — TELEPHONE ENCOUNTER
Pt's wife states that pt was recently admitted into hospital, and while in the hospital. Pt tested positive for covid-19. States that pt is asymptomatic. But is positive. A hospital follow up was not scheduled by the hospital for that reason. But pt already has an annual wellness check up scheduled for 07/30/2020 with PCP. Pt wife states that he needs to be seen for wellness check up in order to stay on her insurance. Informed her that the message would be forwarded to PCP and she would be called back with recommendations. Verbalizes understanding.

## 2020-07-28 NOTE — TELEPHONE ENCOUNTER
----- Message from Tiffany Brandt sent at 7/28/2020  1:26 PM CDT -----  Regarding: corey - wife  Type: Patient Call Back       What is the request in detail:  pt wife calling to speak to a nurse regarding pt was the hospital.      Can the clinic reply by [facility]? No       Would the patient rather a call back or a response via [facility]? Call back       Best call back number: [phone]

## 2020-07-30 ENCOUNTER — OFFICE VISIT (OUTPATIENT)
Dept: FAMILY MEDICINE | Facility: CLINIC | Age: 55
End: 2020-07-30
Payer: COMMERCIAL

## 2020-07-30 DIAGNOSIS — U07.1 COVID-19 VIRUS INFECTION: ICD-10-CM

## 2020-07-30 DIAGNOSIS — I10 ESSENTIAL HYPERTENSION: ICD-10-CM

## 2020-07-30 DIAGNOSIS — M86.172 ACUTE OSTEOMYELITIS OF METATARSAL BONE OF LEFT FOOT: ICD-10-CM

## 2020-07-30 PROCEDURE — 3075F PR MOST RECENT SYSTOLIC BLOOD PRESS GE 130-139MM HG: ICD-10-PCS | Mod: CPTII,S$GLB,, | Performed by: FAMILY MEDICINE

## 2020-07-30 PROCEDURE — 99215 PR OFFICE/OUTPT VISIT, EST, LEVL V, 40-54 MIN: ICD-10-PCS | Mod: S$GLB,,, | Performed by: FAMILY MEDICINE

## 2020-07-30 PROCEDURE — 3075F SYST BP GE 130 - 139MM HG: CPT | Mod: CPTII,S$GLB,, | Performed by: FAMILY MEDICINE

## 2020-07-30 PROCEDURE — 3008F BODY MASS INDEX DOCD: CPT | Mod: CPTII,S$GLB,, | Performed by: FAMILY MEDICINE

## 2020-07-30 PROCEDURE — 3008F PR BODY MASS INDEX (BMI) DOCUMENTED: ICD-10-PCS | Mod: CPTII,S$GLB,, | Performed by: FAMILY MEDICINE

## 2020-07-30 PROCEDURE — 99215 OFFICE O/P EST HI 40 MIN: CPT | Mod: S$GLB,,, | Performed by: FAMILY MEDICINE

## 2020-07-30 PROCEDURE — 3052F PR MOST RECENT HEMOGLOBIN A1C LEVEL 8.0 - < 9.0%: ICD-10-PCS | Mod: CPTII,S$GLB,, | Performed by: FAMILY MEDICINE

## 2020-07-30 PROCEDURE — 99999 PR PBB SHADOW E&M-EST. PATIENT-LVL III: CPT | Mod: PBBFAC,,, | Performed by: FAMILY MEDICINE

## 2020-07-30 PROCEDURE — 3079F PR MOST RECENT DIASTOLIC BLOOD PRESSURE 80-89 MM HG: ICD-10-PCS | Mod: CPTII,S$GLB,, | Performed by: FAMILY MEDICINE

## 2020-07-30 PROCEDURE — 99999 PR PBB SHADOW E&M-EST. PATIENT-LVL III: ICD-10-PCS | Mod: PBBFAC,,, | Performed by: FAMILY MEDICINE

## 2020-07-30 PROCEDURE — 3052F HG A1C>EQUAL 8.0%<EQUAL 9.0%: CPT | Mod: CPTII,S$GLB,, | Performed by: FAMILY MEDICINE

## 2020-07-30 PROCEDURE — 3079F DIAST BP 80-89 MM HG: CPT | Mod: CPTII,S$GLB,, | Performed by: FAMILY MEDICINE

## 2020-07-30 NOTE — PROGRESS NOTES
Chief Complaint   Patient presents with    Annual Exam       Catalino Mcfadden is a 55 y.o. male who presents per the Chief Complaint.  Pt is known to me and was last seen by me on 3/10/2020.  All known chronic medical issues have been documented.    Hypertension  This is a chronic problem. The current episode started more than 1 year ago. The problem is unchanged. The problem is controlled. Associated symptoms include headaches (occasional) and neck pain (improved). Pertinent negatives include no anxiety, blurred vision, chest pain, palpitations, peripheral edema, shortness of breath or sweats. There are no associated agents to hypertension. Risk factors for coronary artery disease include diabetes mellitus, dyslipidemia, male gender and obesity. Past treatments include beta blockers, calcium channel blockers, diuretics and ACE inhibitors. The current treatment provides moderate improvement. There are no compliance problems.  Hypertensive end-organ damage includes kidney disease and retinopathy. There is no history of angina, CAD/MI, CVA, heart failure, left ventricular hypertrophy or PVD. Identifiable causes of hypertension include chronic renal disease. There is no history of coarctation of the aorta, hyperaldosteronism, hypercortisolism, hyperparathyroidism, a hypertension causing med, pheochromocytoma, renovascular disease, sleep apnea or a thyroid problem.   Diabetes  He presents for his follow-up diabetic visit. He has type 2 diabetes mellitus. No MedicAlert identification noted. The initial diagnosis of diabetes was made 20 years ago. His disease course has been improving. Hypoglycemia symptoms include headaches (occasional). Pertinent negatives for hypoglycemia include no confusion, dizziness, hunger, mood changes, nervousness/anxiousness, pallor, seizures, sleepiness, speech difficulty, sweats or tremors. Associated symptoms include foot paresthesias. Pertinent negatives for diabetes include no blurred vision,  no chest pain, no fatigue, no foot ulcerations, no polydipsia, no polyphagia, no polyuria, no visual change, no weakness and no weight loss. There are no hypoglycemic complications. Pertinent negatives for hypoglycemia complications include no blackouts, no hospitalization, no nocturnal hypoglycemia, no required assistance and no required glucagon injection. Symptoms are improving. Diabetic complications include nephropathy, peripheral neuropathy and retinopathy. Pertinent negatives for diabetic complications include no autonomic neuropathy, CVA, heart disease or PVD. Risk factors for coronary artery disease include hypertension, obesity, sedentary lifestyle and diabetes mellitus. Current diabetic treatment includes diet and oral agent (dual therapy). He is compliant with treatment all of the time. His weight is decreasing steadily. He is following a generally healthy, low fat/cholesterol and low salt diet. Meal planning includes avoidance of concentrated sweets. He has not had a previous visit with a dietitian. He participates in exercise intermittently. He monitors blood glucose at home 1-2 x per day. He monitors urine at home <1 x per month. Blood glucose monitoring compliance is fair. His home blood glucose trend is decreasing steadily. An ACE inhibitor/angiotensin II receptor blocker is being taken. He sees a podiatrist.Eye exam is not current.       ROS  Review of Systems   Constitutional: Negative for activity change, appetite change, chills, fatigue, fever, unexpected weight change and weight loss.   HENT: Negative for congestion, ear pain, hearing loss, postnasal drip, rhinorrhea, sinus pressure, sore throat and trouble swallowing.    Eyes: Negative for blurred vision, pain, discharge and visual disturbance.   Respiratory: Negative for cough, chest tightness, shortness of breath and wheezing.    Cardiovascular: Negative for chest pain and palpitations.   Gastrointestinal: Negative for abdominal pain, blood  in stool, constipation, diarrhea, nausea and vomiting.   Endocrine: Negative for polydipsia, polyphagia and polyuria.   Genitourinary: Negative for difficulty urinating, dysuria, flank pain, frequency, hematuria, penile pain, penile swelling, scrotal swelling, testicular pain and urgency.   Musculoskeletal: Positive for arthralgias (bilateral toe injury) and neck pain (improved). Negative for back pain, joint swelling, myalgias and neck stiffness.   Skin: Negative.  Negative for pallor.   Allergic/Immunologic: Negative for environmental allergies, food allergies and immunocompromised state.   Neurological: Positive for headaches (occasional). Negative for dizziness, tremors, seizures, speech difficulty, weakness and light-headedness.   Psychiatric/Behavioral: Negative.  Negative for confusion and dysphoric mood. The patient is not nervous/anxious.        Physical Exam  Vitals:    07/30/20 1150   Temp: 98.4 °F (36.9 °C)    Body mass index is 32.95 kg/m².  Weight: 113.3 kg (249 lb 12.5 oz)         Physical Exam  Constitutional:       General: He is not in acute distress.     Appearance: Normal appearance. He is well-developed. He is not ill-appearing, toxic-appearing or diaphoretic.   HENT:      Head: Normocephalic and atraumatic.        Right Ear: Hearing and external ear normal. No decreased hearing noted.      Left Ear: Hearing and external ear normal. No decreased hearing noted.      Nose: Nose normal. No nasal deformity or rhinorrhea.      Mouth/Throat:      Dentition: Normal dentition. Does not have dentures.      Pharynx: Uvula midline.   Eyes:      General: Lids are normal. No scleral icterus.        Right eye: No foreign body or discharge.         Left eye: No foreign body or discharge.      Conjunctiva/sclera: Conjunctivae normal.      Right eye: No chemosis or exudate.     Left eye: No chemosis or exudate.     Pupils: Pupils are equal, round, and reactive to light.   Neck:      Musculoskeletal: Full passive  range of motion without pain, normal range of motion and neck supple.   Cardiovascular:      Rate and Rhythm: Normal rate and regular rhythm.      Heart sounds: Normal heart sounds, S1 normal and S2 normal. No murmur. No friction rub. No gallop.    Pulmonary:      Effort: Pulmonary effort is normal. No accessory muscle usage or respiratory distress.      Breath sounds: Normal breath sounds. No decreased breath sounds, wheezing, rhonchi or rales.   Abdominal:      General: There is no distension.      Palpations: Abdomen is soft. Abdomen is not rigid.      Tenderness: There is no abdominal tenderness. There is no guarding or rebound.   Musculoskeletal:      Left ankle: He exhibits decreased range of motion.      Cervical back: He exhibits tenderness and pain. He exhibits normal range of motion, no bony tenderness, no swelling, no edema, no deformity, no laceration, no spasm and normal pulse.        Feet:       Comments: Left foot in surgical boot   Skin:     General: Skin is warm and dry.      Findings: No rash.   Neurological:      Mental Status: He is alert and oriented to person, place, and time.      Cranial Nerves: No cranial nerve deficit.      Sensory: No sensory deficit.      Motor: No abnormal muscle tone or seizure activity.      Coordination: Coordination normal.      Gait: Gait normal.   Psychiatric:         Attention and Perception: He is attentive.         Speech: Speech normal.         Behavior: Behavior normal. Behavior is cooperative.         Thought Content: Thought content normal.         Judgment: Judgment normal.         Assessment & Plan    Discussion of plan of care including treatment options regarding health and wellness were reviewed and discussed with patient.  Any changes to medication or treatment plan, as well as any screening blood test, imaging, or referrals to specialist, are documented.  Follow up as indicated.     1. Uncontrolled type 2 diabetes mellitus with stage 3 chronic kidney  disease, without long-term current use of insulin  Patient is encouraged to follow a diet low in carbohydrates and simple sugars.  Discussed simple vs. complex carbohydrates as well as eating times of certain meals. Advised to focus on good food choices and increased physical activity and encouraged to adhere to medication regimen and/or lifestyle adjustments, and to check glucose level as recommended.  Contact office if glucose levels are not improving over time.  Will monitor HbA1c appropriately.  Will decrease insulin use; started in hospital due to elevated levels related to steroid use.       2. Acute osteomyelitis of metatarsal bone of left foot  Surgical drainage and debridement; managed by Podiatry.    3. COVID-19 virus infection  Patient was asymptomatic and remained in quarantine for 14 days following testing.    4. Essential hypertension  Patient was counseled and encouraged to maintain a low sodium diet, as well as increasing physical activity.  Recommend random BP checks at home on a regular basis.  Repeat BP at end of visit was not necessary. Will continue medication at this time, and follow up in 3-6 months, or sooner if blood pressure begins to increase.        Follow up in about 3 months (around 10/30/2020) for Chronic Disease Management.      ACTIVE MEDICAL ISSUES:  Documented in Problem List    PAST MEDICAL HISTORY  Documented  .  PAST SURGICAL HISTORY:  Documented    SOCIAL HISTORY:  Documented    FAMILY HISTORY:  Documented    ALLERGIES AND MEDICATIONS: updated and reviewed.  Documented    Health Maintenance       Date Due Completion Date    HIV Screening 04/04/1979 ---    TETANUS VACCINE 04/04/1982 ---    Shingles Vaccine (1 of 2) 04/04/2014 ---    Pneumococcal Vaccine (Highest Risk) (2 of 3 - PCV13) 06/16/2018 6/16/2017    Eye Exam 04/03/2019 4/3/2018    Colorectal Cancer Screening 06/03/2020 6/3/2019    Override on 9/26/2014: Done (future)    Lipid Panel 08/28/2020 8/28/2019    Influenza  Vaccine (1) 09/01/2020 3/10/2020    Hemoglobin A1c 10/17/2020 7/17/2020    Foot Exam 07/16/2021 7/16/2020    Override on 5/25/2020: Done    Override on 4/27/2020: Done    Override on 4/20/2020: Done    Low Dose Statin 07/27/2021 7/27/2020

## 2020-07-30 NOTE — PATIENT INSTRUCTIONS
Switch Novolog to as needed according to this sliding scale    Sliding Scale    0-150    0 Units  151-200  2 Units  201-250 4 Units  251-300 6 Units  301-350 8 Units  401-  10 Units and call clinic     If sugar is over 300, after insulin dose, recheck sugar in 30 minutes to 1 hr.  If blood sugar remains the same or is elevated, repeat sliding scale and call clinic in the morning.     Continue Levemir twice a day for one more week then decrease to once a day if blood sugar levels are dropping.

## 2020-07-31 ENCOUNTER — EXTERNAL HOME HEALTH (OUTPATIENT)
Dept: HOME HEALTH SERVICES | Facility: HOSPITAL | Age: 55
End: 2020-07-31
Payer: COMMERCIAL

## 2020-07-31 ENCOUNTER — HOSPITAL ENCOUNTER (OUTPATIENT)
Dept: WOUND CARE | Facility: HOSPITAL | Age: 55
Discharge: HOME | DRG: 189 | End: 2020-07-31
Attending: PODIATRIST
Payer: COMMERCIAL

## 2020-07-31 ENCOUNTER — TELEPHONE (OUTPATIENT)
Dept: WOUND CARE | Facility: HOSPITAL | Age: 55
End: 2020-07-31

## 2020-07-31 VITALS — SYSTOLIC BLOOD PRESSURE: 173 MMHG | DIASTOLIC BLOOD PRESSURE: 85 MMHG | TEMPERATURE: 99 F | HEART RATE: 89 BPM

## 2020-07-31 VITALS
WEIGHT: 249.81 LBS | BODY MASS INDEX: 32.95 KG/M2 | SYSTOLIC BLOOD PRESSURE: 138 MMHG | TEMPERATURE: 98 F | DIASTOLIC BLOOD PRESSURE: 84 MMHG

## 2020-07-31 DIAGNOSIS — L97.529 DIABETIC ULCER OF TOE OF LEFT FOOT: ICD-10-CM

## 2020-07-31 DIAGNOSIS — M86.9 OSTEOMYELITIS OF LEFT FOOT, UNSPECIFIED TYPE: ICD-10-CM

## 2020-07-31 DIAGNOSIS — E11.621 DIABETIC ULCER OF TOE OF LEFT FOOT: ICD-10-CM

## 2020-07-31 DIAGNOSIS — E11.621 DIABETIC ULCER OF LEFT MIDFOOT ASSOCIATED WITH TYPE 2 DIABETES MELLITUS, WITH BONE INVOLVEMENT WITHOUT EVIDENCE OF NECROSIS: Primary | ICD-10-CM

## 2020-07-31 DIAGNOSIS — L97.426 DIABETIC ULCER OF LEFT MIDFOOT ASSOCIATED WITH TYPE 2 DIABETES MELLITUS, WITH BONE INVOLVEMENT WITHOUT EVIDENCE OF NECROSIS: Primary | ICD-10-CM

## 2020-07-31 DIAGNOSIS — M86.9 OSTEOMYELITIS OF LEFT FOOT: ICD-10-CM

## 2020-07-31 PROCEDURE — 11044 WOUND DEBRIDEMENT: ICD-10-PCS | Mod: ,,, | Performed by: PODIATRIST

## 2020-07-31 PROCEDURE — 11044 DBRDMT BONE 1ST 20 SQ CM/<: CPT | Performed by: PODIATRIST

## 2020-07-31 PROCEDURE — 11044 DBRDMT BONE 1ST 20 SQ CM/<: CPT | Mod: ,,, | Performed by: PODIATRIST

## 2020-07-31 PROCEDURE — 27201912 HC WOUND CARE DEBRIDEMENT SUPPLIES: Performed by: PODIATRIST

## 2020-07-31 NOTE — PROGRESS NOTES
[facility]                Progress Note    Subjective:       Patient ID: Catalino Mcfadden is a 55 y.o. male.    Chief Complaint: No chief complaint on file.    Patient ambulated to to clinic bed without assistance. No pain reported. Patient states he is fatigued due to lack of sleep the last 2 days and an ongoing headache. Known COVID positive 7/16/20. PICC line with antibiotics to RUE. Left Foot recently debrided in hospital during stay. Vinegar soak to wound after further debridement in clinic today. Plan for Home Health to change hydrofera blue dressing with 2 layer compression. Will send for [facility] to start hopefully by next week.           Patient Active Problem List   Diagnosis    Uncontrolled type 2 diabetes mellitus with stage 3 chronic kidney disease, without long-term current use of insulin    Essential hypertension    Hyperlipidemia, acquired    ED (erectile dysfunction)    CKD (chronic kidney disease), stage III    History of diabetic ulcer of foot    Osteomyelitis of second toe of left foot    Type 2 diabetes mellitus with foot ulcer, without long-term current use of insulin    Long term (current) use of antibiotics    Acute osteomyelitis of metatarsal bone of left foot    Diabetic ulcer of toe of left foot    COVID-19 virus infection    Hyponatremia    Acute renal failure superimposed on stage 3 chronic kidney disease    Hyperkalemia    Osteomyelitis of left foot       Current Outpatient Medications on File Prior to Encounter   Medication Sig Dispense Refill    amLODIPine (NORVASC) 10 MG tablet Take 1 tablet (10 mg total) by mouth once daily. 90 tablet 3    atorvastatin (LIPITOR) 40 MG tablet Take 1 tablet (40 mg total) by mouth once daily. 90 tablet 3    blood sugar diagnostic Strp 1 strip by Misc.(Non-Drug; Combo Route) route 3 (three) times daily. Patient needs One Touch Test Strips (Berio). 100 strip 2    carvediloL (COREG) 25 MG tablet  "Take 1 tablet (25 mg total) by mouth 2 (two) times daily. 60 tablet 11    gabapentin (NEURONTIN) 300 MG capsule Take 1 capsule (300 mg total) by mouth 2 (two) times daily. 60 capsule 11    hydrALAZINE (APRESOLINE) 50 MG tablet Take 1 tablet (50 mg total) by mouth every 8 (eight) hours. 90 tablet 11    insulin aspart U-100 (NOVOLOG) 100 unit/mL (3 mL) InPn pen Inject 10 Units into the skin 3 (three) times daily with meals. 27 mL 3    insulin detemir U-100 (LEVEMIR FLEXTOUCH) 100 unit/mL (3 mL) SubQ InPn pen Inject 20 Units into the skin 2 (two) times daily. 36 mL 3    pen needle, diabetic 32 gauge x 3/16" Ndle 1 each by Misc.(Non-Drug; Combo Route) route 5 (five) times daily. 100 each 10    TRULICITY 0.75 mg/0.5 mL PnIj INJECT 0.5 ML UNDER THE SKIN EVERY 7 DAYS 2 mL 5     No current facility-administered medications on file prior to encounter.        Review of patient's allergies indicates:  No Known Allergies    Past Surgical History:   Procedure Laterality Date    BONE BIOPSY Left 7/17/2020    Procedure: BIOPSY, BONE;  Surgeon: Verona Whitmore DPM;  Location: [facility] OR;  Service: Podiatry;  Laterality: Left;    CERVICAL DISC SURGERY  07/11/2016    DEBRIDEMENT Left 7/17/2020    Procedure: DEBRIDEMENT;  Surgeon: Verona Whitmore DPM;  Location: [facility] OR;  Service: Podiatry;  Laterality: Left;    left leg orthopedic surgery Left        Family History   Problem Relation Age of Onset    Heart disease Father        Social History     Socioeconomic History    Marital status:      Spouse name: Not on file    Number of children: Not on file    Years of education: Not on file    Highest education level: Not on file   Occupational History    Not on file   Social Needs    Financial resource strain: Not on file    Food insecurity     Worry: Not on file     Inability: Not on file    Transportation needs     Medical: Not on file     Non-medical: Not on file   Tobacco Use    Smoking status: Never Smoker "    Smokeless tobacco: Never Used   Substance and Sexual Activity    Alcohol use: Not Currently     Alcohol/week: 0.0 standard drinks     Comment: social    Drug use: No    Sexual activity: Not on file   Lifestyle    Physical activity     Days per week: Not on file     Minutes per session: Not on file    Stress: Not on file   Relationships    Social connections     Talks on phone: Not on file     Gets together: Not on file     Attends Faith service: Not on file     Active member of club or organization: Not on file     Attends meetings of clubs or organizations: Not on file     Relationship status: Not on file   Other Topics Concern    Not on file   Social History Narrative    Not on file         Review of Systems   Constitutional: Positive for activity change. Negative for appetite change, chills, fatigue, fever and unexpected weight change.   HENT: Negative for congestion, ear pain, hearing loss, postnasal drip, rhinorrhea, sinus pressure, sore throat and trouble swallowing.    Eyes: Negative for pain, discharge and visual disturbance.   Respiratory: Negative for cough, chest tightness, shortness of breath and wheezing.    Cardiovascular: Positive for leg swelling. Negative for chest pain and palpitations.   Gastrointestinal: Negative for abdominal pain, blood in stool, constipation, diarrhea, nausea and vomiting.   Endocrine: Negative for polydipsia, polyphagia and polyuria.   Genitourinary: Negative for flank pain and frequency.   Musculoskeletal: Positive for arthralgias, joint swelling, myalgias and neck pain (improved). Negative for back pain and neck stiffness.   Skin: Positive for wound. Negative for pallor.   Allergic/Immunologic: Negative for environmental allergies, food allergies and immunocompromised state.   Neurological: Positive for numbness and headaches (occasional). Negative for dizziness, seizures, speech difficulty and light-headedness.   Psychiatric/Behavioral: Negative.  Negative  for confusion and dysphoric mood. The patient is not nervous/anxious.          Objective:        Physical Exam  Vitals signs and nursing note reviewed.   Constitutional:       General: He is not in acute distress.     Appearance: He is not toxic-appearing or diaphoretic.      Comments: Pt. is well-developed, well-nourished, appears stated age, in no acute distress, alert and oriented x 3. No evidence of depression, anxiety, or agitation. Calm, cooperative, and communicative. Appropriate interactions and affect.   Cardiovascular:      Pulses:           Dorsalis pedis pulses are 2+ on the right side and 2+ on the left side.        Posterior tibial pulses are 1+ on the right side and 1+ on the left side.      Comments: There is decreased digital hair. Skin is atrophic, slightly hyperpigmented  Pulmonary:      Effort: No respiratory distress.   Musculoskeletal:      Right ankle: He exhibits normal range of motion and no swelling. No tenderness. No lateral malleolus, no medial malleolus, no AITFL, no CF ligament and no posterior TFL tenderness found. Achilles tendon exhibits no pain, no defect and normal Hilliard's test results.      Left ankle: He exhibits normal range of motion and no swelling. No tenderness. No lateral malleolus, no medial malleolus, no AITFL, no CF ligament and no posterior TFL tenderness found. Achilles tendon exhibits no pain, no defect and normal Hilliard's test results.      Right foot: No tenderness or bony tenderness.      Left foot: No tenderness or bony tenderness.      Comments: Decreased stride, station of gait.  apropulsive toe off.  Increased angle and base of gait.    There is equinus deformity bilateral with decreased dorsiflexion at the ankle joint bilateral. No tenderness with compression of heel. Negative tinels sign. Gait analysis reveals excessive pronation through midstance and propulsion with early heel off.     Patient has hammertoes of digits 2-5 bilateral partially reducible      Decreased first MPJ range of motion both weightbearing and nonweightbearing, no crepitus observed the first MP joint, + dorsal flag sign.     Visible and palpable bunion without pain at dorsomedial 1st metatarsal head right and left.  Hallux abducted right and left partially reducible, tracks laterally without being track bound.  No ecchymosis, erythema, edema, or cardinal signs infection or signs of trauma same foot.    Fat pad atrophy to heels and met heads bilateral    Plantarflexed 1st ray RLE   Lymphadenopathy:      Comments: No lymphatic streaking    Negative lymphadenopathy bilateral popliteal fossa and tarsal tunnel.     Skin:     General: Skin is warm and dry.      Coloration: Skin is not pale.      Findings: Erythema and lesion (wound description) present. No abrasion, ecchymosis, laceration or rash.      Nails: There is no clubbing.        Comments: Ulcer location: Left  lateral 5th metatarsal base     Toenails 1-5 bilaterally discolored/yellowed, dystrophic, brittle with subungual debris.    Neurological:      Sensory: Sensory deficit present.      Comments:  [place] 5.07 monofilamant testing is diminished Kp feet. Decreased/absent vibratory sensation bilateral feet to 128Hz tuning fork.     Paresthesias, and hyperesthesia bilateral feet with no clearly identified trigger or source.           Psychiatric:         Attention and Perception: He is attentive.         Mood and Affect: Mood is not anxious. Affect is not inappropriate.         Speech: He is communicative. Speech is not slurred.         Behavior: Behavior is not combative.           Vitals:    07/31/20 1005   BP: (!) 173/85   Pulse: 89   Temp: 99.1 °F (37.3 °C)       Assessment:           ICD-10-CM ICD-9-CM   1. Diabetic ulcer of left midfoot associated with type 2 diabetes mellitus, with bone involvement without evidence of necrosis  E11.621 250.80    L97.426 707.14   2. Osteomyelitis of left foot, unspecified type  M86.9 730.27    3. Diabetic ulcer of toe of left foot  E11.621 250.80    L97.529 707.15   4. Osteomyelitis of left foot  M86.9 730.27            Incision/Site 07/17/20 1251 Left Foot (Active)   07/17/20 1251   Present Prior to Hospital Arrival?:    Side: Left   Location: Foot   Orientation:    Incision Type:    Closure Method: Other (see comments)   Additional Comments:  open wound .betadine soak in 4x4's, abd, ace, castpadding, surgical boot   Removal Indication and Assessment:    Wound Outcome:    Removal Indications:    Wound Image   07/31/20 1000   Incision WDL ex 07/31/20 1000   Dressing Appearance Moist drainage 07/31/20 1000   Drainage Amount Large 07/31/20 1000   Drainage Characteristics/Odor Serosanguineous 07/31/20 1000   Appearance Pink;Red;Yellow;Slough;Sutures intact 07/31/20 1000   Black (%), Wound Tissue Color 0 % 07/31/20 1000   Red (%), Wound Tissue Color 80 % 07/31/20 1000   Yellow (%), Wound Tissue Color 20 % 07/31/20 1000   Periwound Area Intact 07/31/20 1000   Wound Edges Open 07/31/20 1000   Wound Length (cm) 4.5 cm 07/31/20 1000   Wound Width (cm) 3.5 cm 07/31/20 1000   Wound Depth (cm) 1.1 cm 07/31/20 1000   Wound Volume (cm^3) 17.32 cm^3 07/31/20 1000   Wound Surface Area (cm^2) 15.75 cm^2 07/31/20 1000   Care Cleansed with:;Soap and water;Sterile normal saline;Other (see comments) 07/31/20 1000   Dressing Changed;Methylene blue/gentian violet;Other (see comments);Compression wrap;Collagen 07/31/20 1000   Periwound Care Moisture barrier applied 07/31/20 1000   Compression Two layer compression 07/31/20 1000           Plan:            Education about the prevention of limb loss.    Discussed wound healing cycle, skin integrity, ways to care for skin.Counseled patient on the effects of blood glucose on healing. He verbalizes understanding that it can increase the chances of delayed healing and this prolonged exposure leads to infection or progression of infection which subsequently can result in loss of  "limb.    The wound is cleansed of foreign material as much as possible and the base inspected for bone or abscess.  No active purulence noted on today's encounter.  Bone is being covered with granulation but still noted to wound bed.  Debridement note at end    CT ordered on 7/21/20- negative for abscess.     Patient would benefit from wound VAC, will initiate order.  Continue home health until this time    Limited ambulation with pressure to heel.       Wound Debridement    Date/Time: 7/31/2020 10:45 AM  Performed by: Aviva Martinez DPM  Authorized by: Aviva Martinez DPM     Time out: Immediately prior to procedure a "time out" was called to verify the correct patient, procedure, equipment, support staff and site/side marked as required.    Consent Done?:  Yes (Written)    Preparation: Patient was prepped and draped in usual sterile fashion    Local anesthesia used?: No      Wound Details:    Location:  Left foot    Location:  Left Midfoot    Type of Debridement:  Excisional       Length (cm):  4.5       Area (sq cm):  15.75       Width (cm):  3.5       Percent Debrided (%):  100       Depth (cm):  1.1       Total Area Debrided (sq cm):  15.75    Depth of debridement:  Bone    Tissue debrided:  Dermis, Epidermis, Subcutaneous and Bone    Devitalized tissue debrided:  Biofilm, Callus and Fibrin    Instruments:  Rongeur and Curette    Bleeding:  Minimal  Hemostasis Achieved: Yes    Method Used:  Pressure  Patient tolerance:  Patient tolerated the procedure well with no immediate complications        Orders Placed This Encounter   Procedures    Ambulatory referral/consult to Wound Clinic     Standing Status:   Standing     Number of Occurrences:   1     Referral Priority:   Urgent     Referral Type:   Consultation     Referral Reason:   Specialty Services Required     Requested Specialty:   Wound Care     Number of Visits Requested:   1    Change dressing     Clean with vinegar soak and rinse with saline. Apply " "gentian violet periwound. Promogran with hydrofera blue transfer. Mextra. Coflex with calamine.    HOME HEALTH ORDERS     Subsequent Home Health Orders    Current Medications:  Current Outpatient Medications:  amLODIPine (NORVASC) 10 MG tablet, Take 1 tablet (10 mg total) by mouth once daily., Disp: 90 tablet, Rfl: 3  atorvastatin (LIPITOR) 40 MG tablet, Take 1 tablet (40 mg total) by mouth once daily., Disp: 90 tablet, Rfl: 3  blood sugar diagnostic Strp, 1 strip by Misc.(Non-Drug; Combo Route) route 3 (three) times daily. Patient needs One Touch Test Strips (Berio)., Disp: 100 strip, Rfl: 2  carvediloL (COREG) 25 MG tablet, Take 1 tablet (25 mg total) by mouth 2 (two) times daily., Disp: 60 tablet, Rfl: 11  gabapentin (NEURONTIN) 300 MG capsule, Take 1 capsule (300 mg total) by mouth 2 (two) times daily., Disp: 60 capsule, Rfl: 11  hydrALAZINE (APRESOLINE) 50 MG tablet, Take 1 tablet (50 mg total) by mouth every 8 (eight) hours., Disp: 90 tablet, Rfl: 11  insulin aspart U-100 (NOVOLOG) 100 unit/mL (3 mL) InPn pen, Inject 10 Units into the skin 3 (three) times daily with meals., Disp: 27 mL, Rfl: 3  insulin detemir U-100 (LEVEMIR FLEXTOUCH) 100 unit/mL (3 mL) SubQ InPn pen, Inject 20 Units into the skin 2 (two) times daily., Disp: 36 mL, Rfl: 3  pen needle, diabetic 32 gauge x 3/16" Ndle, 1 each by Misc.(Non-Drug; Combo Route) route 5 (five) times daily., Disp: 100 each, Rfl: 10  TRULICITY 0.75 mg/0.5 mL PnIj, INJECT 0.5 ML UNDER THE SKIN EVERY 7 DAYS, Disp: 2 mL, Rfl: 5    No current facility-administered medications for this encounter.       Nursing:   Clean wound with vashe -preferred (saline if not available). Apply gentian violet periwound. Apply lucas to wound base. Cover with hydrofera blue transfer, mextra, marycarmen foam long x 1, calamine coflex. Change Monday and Wednesday per Home Health.     Order Specific Question:   What Home Health Agency is the patient currently using?     Answer:   Other/External "     Comments:   [facility]        Follow up in about 1 week (around 8/7/2020) for wound care.

## 2020-08-03 ENCOUNTER — TELEPHONE (OUTPATIENT)
Dept: FAMILY MEDICINE | Facility: CLINIC | Age: 55
End: 2020-08-03

## 2020-08-03 ENCOUNTER — HOSPITAL ENCOUNTER (INPATIENT)
Facility: HOSPITAL | Age: 55
LOS: 4 days | Discharge: HOME HEALTH | DRG: 189 | End: 2020-08-07
Attending: FAMILY MEDICINE | Admitting: EMERGENCY MEDICINE
Payer: COMMERCIAL

## 2020-08-03 DIAGNOSIS — M86.172 ACUTE OSTEOMYELITIS OF METATARSAL BONE OF LEFT FOOT: ICD-10-CM

## 2020-08-03 DIAGNOSIS — I50.9 ACUTE CONGESTIVE HEART FAILURE, UNSPECIFIED HEART FAILURE TYPE: ICD-10-CM

## 2020-08-03 DIAGNOSIS — R06.00 DYSPNEA, UNSPECIFIED TYPE: ICD-10-CM

## 2020-08-03 DIAGNOSIS — R60.9 EDEMA: ICD-10-CM

## 2020-08-03 DIAGNOSIS — J96.01 ACUTE RESPIRATORY FAILURE WITH HYPOXIA: Primary | ICD-10-CM

## 2020-08-03 DIAGNOSIS — R60.1 ANASARCA: ICD-10-CM

## 2020-08-03 PROBLEM — R07.9 CHEST PAIN: Status: ACTIVE | Noted: 2020-08-03

## 2020-08-03 PROBLEM — R79.89 ELEVATED TROPONIN: Status: ACTIVE | Noted: 2020-08-03

## 2020-08-03 PROBLEM — E87.5 HYPERKALEMIA: Status: RESOLVED | Noted: 2020-07-22 | Resolved: 2020-08-03

## 2020-08-03 PROBLEM — N18.9 ACUTE KIDNEY INJURY SUPERIMPOSED ON CHRONIC KIDNEY DISEASE: Status: ACTIVE | Noted: 2020-07-16

## 2020-08-03 LAB
ALBUMIN SERPL BCP-MCNC: 2.1 G/DL (ref 3.5–5.2)
ALP SERPL-CCNC: 244 U/L (ref 55–135)
ALT SERPL W/O P-5'-P-CCNC: 34 U/L (ref 10–44)
ANION GAP SERPL CALC-SCNC: 8 MMOL/L (ref 8–16)
AST SERPL-CCNC: 41 U/L (ref 10–40)
BASOPHILS # BLD AUTO: 0.04 K/UL (ref 0–0.2)
BASOPHILS NFR BLD: 0.5 % (ref 0–1.9)
BILIRUB SERPL-MCNC: 0.4 MG/DL (ref 0.1–1)
BNP SERPL-MCNC: 230 PG/ML (ref 0–99)
BUN SERPL-MCNC: 53 MG/DL (ref 6–20)
CALCIUM SERPL-MCNC: 8.4 MG/DL (ref 8.7–10.5)
CHLORIDE SERPL-SCNC: 105 MMOL/L (ref 95–110)
CO2 SERPL-SCNC: 19 MMOL/L (ref 23–29)
CREAT SERPL-MCNC: 4 MG/DL (ref 0.5–1.4)
D DIMER PPP IA.FEU-MCNC: 1.91 MG/L FEU
DIFFERENTIAL METHOD: ABNORMAL
EOSINOPHIL # BLD AUTO: 0 K/UL (ref 0–0.5)
EOSINOPHIL NFR BLD: 0.5 % (ref 0–8)
ERYTHROCYTE [DISTWIDTH] IN BLOOD BY AUTOMATED COUNT: 13.2 % (ref 11.5–14.5)
EST. GFR  (AFRICAN AMERICAN): 18 ML/MIN/1.73 M^2
EST. GFR  (NON AFRICAN AMERICAN): 16 ML/MIN/1.73 M^2
GLUCOSE SERPL-MCNC: 93 MG/DL (ref 70–110)
HCT VFR BLD AUTO: 28.6 % (ref 40–54)
HGB BLD-MCNC: 8.8 G/DL (ref 14–18)
IMM GRANULOCYTES # BLD AUTO: 0.07 K/UL (ref 0–0.04)
IMM GRANULOCYTES NFR BLD AUTO: 0.8 % (ref 0–0.5)
INR PPP: 1.1 (ref 0.8–1.2)
LYMPHOCYTES # BLD AUTO: 0.8 K/UL (ref 1–4.8)
LYMPHOCYTES NFR BLD: 9 % (ref 18–48)
MCH RBC QN AUTO: 26.3 PG (ref 27–31)
MCHC RBC AUTO-ENTMCNC: 30.8 G/DL (ref 32–36)
MCV RBC AUTO: 86 FL (ref 82–98)
MONOCYTES # BLD AUTO: 1 K/UL (ref 0.3–1)
MONOCYTES NFR BLD: 11.9 % (ref 4–15)
NEUTROPHILS # BLD AUTO: 6.5 K/UL (ref 1.8–7.7)
NEUTROPHILS NFR BLD: 77.3 % (ref 38–73)
NRBC BLD-RTO: 0 /100 WBC
PLATELET # BLD AUTO: 227 K/UL (ref 150–350)
PMV BLD AUTO: 9.4 FL (ref 9.2–12.9)
POCT GLUCOSE: 104 MG/DL (ref 70–110)
POCT GLUCOSE: 87 MG/DL (ref 70–110)
POTASSIUM SERPL-SCNC: 4.7 MMOL/L (ref 3.5–5.1)
PROT SERPL-MCNC: 6.9 G/DL (ref 6–8.4)
PROTHROMBIN TIME: 12 SEC (ref 9–12.5)
RBC # BLD AUTO: 3.34 M/UL (ref 4.6–6.2)
SODIUM SERPL-SCNC: 132 MMOL/L (ref 136–145)
TROPONIN I SERPL DL<=0.01 NG/ML-MCNC: 0.1 NG/ML (ref 0–0.03)
TROPONIN I SERPL DL<=0.01 NG/ML-MCNC: 0.12 NG/ML (ref 0–0.03)
TSH SERPL DL<=0.005 MIU/L-ACNC: 0.47 UIU/ML (ref 0.4–4)
VANCOMYCIN SERPL-MCNC: <1.1 UG/ML
WBC # BLD AUTO: 8.46 K/UL (ref 3.9–12.7)

## 2020-08-03 PROCEDURE — 96372 THER/PROPH/DIAG INJ SC/IM: CPT | Mod: 59

## 2020-08-03 PROCEDURE — 96374 THER/PROPH/DIAG INJ IV PUSH: CPT

## 2020-08-03 PROCEDURE — 36415 COLL VENOUS BLD VENIPUNCTURE: CPT

## 2020-08-03 PROCEDURE — 85610 PROTHROMBIN TIME: CPT

## 2020-08-03 PROCEDURE — 82962 GLUCOSE BLOOD TEST: CPT

## 2020-08-03 PROCEDURE — 85025 COMPLETE CBC W/AUTO DIFF WBC: CPT | Mod: 91

## 2020-08-03 PROCEDURE — 80053 COMPREHEN METABOLIC PANEL: CPT | Mod: 91

## 2020-08-03 PROCEDURE — 25000003 PHARM REV CODE 250: Performed by: EMERGENCY MEDICINE

## 2020-08-03 PROCEDURE — 85379 FIBRIN DEGRADATION QUANT: CPT

## 2020-08-03 PROCEDURE — 93005 ELECTROCARDIOGRAM TRACING: CPT

## 2020-08-03 PROCEDURE — 63600175 PHARM REV CODE 636 W HCPCS: Performed by: EMERGENCY MEDICINE

## 2020-08-03 PROCEDURE — 84484 ASSAY OF TROPONIN QUANT: CPT

## 2020-08-03 PROCEDURE — 84443 ASSAY THYROID STIM HORMONE: CPT

## 2020-08-03 PROCEDURE — 99291 CRITICAL CARE FIRST HOUR: CPT | Mod: 25

## 2020-08-03 PROCEDURE — 93010 EKG 12-LEAD: ICD-10-PCS | Mod: ,,, | Performed by: INTERNAL MEDICINE

## 2020-08-03 PROCEDURE — 11000001 HC ACUTE MED/SURG PRIVATE ROOM

## 2020-08-03 PROCEDURE — 80202 ASSAY OF VANCOMYCIN: CPT

## 2020-08-03 PROCEDURE — 84484 ASSAY OF TROPONIN QUANT: CPT | Mod: 91

## 2020-08-03 PROCEDURE — 93010 ELECTROCARDIOGRAM REPORT: CPT | Mod: ,,, | Performed by: INTERNAL MEDICINE

## 2020-08-03 PROCEDURE — 83880 ASSAY OF NATRIURETIC PEPTIDE: CPT

## 2020-08-03 RX ORDER — HYDRALAZINE HYDROCHLORIDE 25 MG/1
50 TABLET, FILM COATED ORAL EVERY 8 HOURS
Status: DISCONTINUED | OUTPATIENT
Start: 2020-08-03 | End: 2020-08-05

## 2020-08-03 RX ORDER — DEXTROSE 50 % IN WATER (D50W) INTRAVENOUS SYRINGE
25
Status: COMPLETED | OUTPATIENT
Start: 2020-08-03 | End: 2020-08-03

## 2020-08-03 RX ORDER — FUROSEMIDE 10 MG/ML
40 INJECTION INTRAMUSCULAR; INTRAVENOUS 2 TIMES DAILY
Status: DISCONTINUED | OUTPATIENT
Start: 2020-08-04 | End: 2020-08-04

## 2020-08-03 RX ORDER — FUROSEMIDE 10 MG/ML
60 INJECTION INTRAMUSCULAR; INTRAVENOUS
Status: COMPLETED | OUTPATIENT
Start: 2020-08-03 | End: 2020-08-03

## 2020-08-03 RX ORDER — INSULIN ASPART 100 [IU]/ML
10 INJECTION, SOLUTION INTRAVENOUS; SUBCUTANEOUS
Status: DISCONTINUED | OUTPATIENT
Start: 2020-08-04 | End: 2020-08-04

## 2020-08-03 RX ORDER — ENOXAPARIN SODIUM 100 MG/ML
1 INJECTION SUBCUTANEOUS
Status: DISCONTINUED | OUTPATIENT
Start: 2020-08-04 | End: 2020-08-04

## 2020-08-03 RX ORDER — ASPIRIN 325 MG
325 TABLET, DELAYED RELEASE (ENTERIC COATED) ORAL DAILY
Status: DISCONTINUED | OUTPATIENT
Start: 2020-08-04 | End: 2020-08-07 | Stop reason: HOSPADM

## 2020-08-03 RX ORDER — ENOXAPARIN SODIUM 100 MG/ML
1 INJECTION SUBCUTANEOUS
Status: COMPLETED | OUTPATIENT
Start: 2020-08-03 | End: 2020-08-03

## 2020-08-03 RX ADMIN — FUROSEMIDE 60 MG: 10 INJECTION, SOLUTION INTRAMUSCULAR; INTRAVENOUS at 05:08

## 2020-08-03 RX ADMIN — DEXTROSE MONOHYDRATE 25 G: 25 INJECTION, SOLUTION INTRAVENOUS at 08:08

## 2020-08-03 RX ADMIN — HYDRALAZINE HYDROCHLORIDE 50 MG: 25 TABLET ORAL at 10:08

## 2020-08-03 RX ADMIN — VANCOMYCIN HYDROCHLORIDE 1750 MG: 100 INJECTION, POWDER, LYOPHILIZED, FOR SOLUTION INTRAVENOUS at 11:08

## 2020-08-03 RX ADMIN — ENOXAPARIN SODIUM 110 MG: 60 INJECTION SUBCUTANEOUS at 05:08

## 2020-08-03 NOTE — HPI
55 y.o. male with hypertension, hyperlipidemia, dm 2, CKD stage 3, osteomyelitis of left foot on home IV antibiotics via PICC line, and recent diagnosis of COVID-19 infection presents with a complaint of shortness of breath.  Acute onset yesterday, associated with extensive edema to the bilateral lower extremities up to the level of the abdomen and arms with abdominal distension.  SOB exacerbated with exertion.  Denies fever, chills, cough, chest pain, palpitations, dizziness, syncope, nausea, vomiting, diarrhea, abdominal pain, bloody black stool, or dysuria.  In the ED, chest x-ray reveals likely pulmonary edema; however, changes secondary to COVID-19 infection not excluded.  Labs show acute kidney injury creatinine 4.0, increased above baseline.  He was found to be mildly hypoxic on room air requiring supplemental oxygen.  Troponin also elevated at 0.118.  ASA, full-dose Lovenox, and IV Lasix initiated.  Admitted to hospital medicine for further evaluation and treatment.

## 2020-08-03 NOTE — TELEPHONE ENCOUNTER
Pt states that he just received his IV therapy, he is now swollen in lower extremities and abdomin. Pt states that he is having difficulty breathing, and wheezing is noted through the phone. Pt advised to go to emergency room. Verbalizes understanding.

## 2020-08-03 NOTE — ED PROVIDER NOTES
Encounter Date: 8/3/2020    SCRIBE #1 NOTE: I, Michelle Cruz, am scribing for, and in the presence of,  Francisco Michelle MD. I have scribed the following portions of the note - Other sections scribed: HPI, ROS, PE, MDM.       History     Chief Complaint   Patient presents with    Shortness of Breath     started  yesterday    Bloated    Leg Swelling     edema getting worse since starting abx    Wheezing     started yesterday     This is a 55 year old male with a PMHx of Chronic Kidney Disease stage 3 due to type 2 Diabetes Mellitus (no dialysis) who presents to the ED complaining of abdominal distention, bilateral leg swelling, and right arm swelling that worsened 1 week ago. He reports that it feels like his whole body is swollen and his abdomen feels bloated and tight. He notes having associated scrotal swelling and shortness of breath with exertion. He reports that he recently had surgery on his left foot due to having a bone infection. He states that he uses an IV at home and has been bed bound since his surgery. He denies a PMHx of heart failure. No other associated symptoms.     The history is provided by the patient. No  was used.     Review of patient's allergies indicates:  No Known Allergies  Past Medical History:   Diagnosis Date    CKD (chronic kidney disease), stage III 07/16/2020    CKD stage 3 due to type 1 diabetes mellitus     CKD stage 3 due to type 2 diabetes mellitus     Diabetes mellitus, type 2     Diabetic foot ulcer associated with diabetes mellitus due to underlying condition 07/16/2020    Diabetic foot ulcers     Hyperlipidemia     Hypertension      Past Surgical History:   Procedure Laterality Date    BONE BIOPSY Left 7/17/2020    Procedure: BIOPSY, BONE;  Surgeon: Verona Whitmore DPM;  [facility]: [facility];  Service: Podiatry;  Laterality: Left;    CERVICAL DISC SURGERY  07/11/2016    DEBRIDEMENT Left 7/17/2020    Procedure: DEBRIDEMENT;  Surgeon: Verona  RADHA Whitmore DPM;  Location: [facility] OR;  Service: Podiatry;  Laterality: Left;    left leg orthopedic surgery Left      Family History   Problem Relation Age of Onset    Heart disease Father      Social History     Tobacco Use    Smoking status: Never Smoker    Smokeless tobacco: Never Used   Substance Use Topics    Alcohol use: Not Currently     Alcohol/week: 0.0 standard drinks     Comment: social    Drug use: No     Review of Systems   Constitutional: Negative for fever.   HENT: Negative for sore throat.    Eyes: Negative for visual disturbance.   Respiratory: Positive for shortness of breath.    Cardiovascular: Positive for leg swelling. Negative for chest pain.   Gastrointestinal: Positive for abdominal distention. Negative for abdominal pain.   Genitourinary: Positive for scrotal swelling. Negative for dysuria.   Musculoskeletal: Negative for back pain.        (+) Right arm swelling   Skin: Negative for rash.   Neurological: Negative for headaches.       Physical Exam     Initial Vitals [08/03/20 1302]   BP Pulse Resp Temp SpO2   (!) 169/91 97 20 98.9 °F (37.2 °C) 95 %      MAP       --         Physical Exam    Nursing note and vitals reviewed.  Constitutional: He appears well-developed and well-nourished. He appears distressed.   HENT:   Head: Normocephalic and atraumatic.   Eyes: EOM are normal. Pupils are equal, round, and reactive to light.   Neck: Normal range of motion. Neck supple. No thyromegaly present. JVD present.   Cardiovascular: Normal rate and regular rhythm. Exam reveals no gallop and no friction rub.    No murmur heard.  Pulmonary/Chest: No respiratory distress. He has rales.   Abdominal: Soft. Bowel sounds are normal. There is no abdominal tenderness.   Musculoskeletal: Normal range of motion. Edema present. No tenderness.      Comments: Patient has diffuse anasarca.  However the right side of his body is more swollen than the left.  Patient also has facial edema.   Neurological: He is alert  and oriented to person, place, and time. He has normal strength. GCS score is 15. GCS eye subscore is 4. GCS verbal subscore is 5. GCS motor subscore is 6.   Skin: Skin is warm and dry. Capillary refill takes less than 2 seconds.         ED Course   Critical Care    Date/Time: 8/3/2020 6:08 PM  Performed by: Francisco Michelle MD  Authorized by: Francisco Michelle MD   Direct patient critical care time: 15 minutes  Additional history critical care time: 10 minutes  Ordering / reviewing critical care time: 10 minutes  Documentation critical care time: 8 minutes  Consulting other physicians critical care time: 6 minutes  Total critical care time (exclusive of procedural time) : 49 minutes  Critical care time was exclusive of separately billable procedures and treating other patients and teaching time.  Critical care was necessary to treat or prevent imminent or life-threatening deterioration of the following conditions: respiratory failure.  Critical care was time spent personally by me on the following activities: development of treatment plan with patient or surrogate, discussions with consultants, interpretation of cardiac output measurements, evaluation of patient's response to treatment, examination of patient, obtaining history from patient or surrogate, ordering and performing treatments and interventions, ordering and review of radiographic studies, ordering and review of laboratory studies, pulse oximetry, re-evaluation of patient's condition and review of old charts.        Labs Reviewed   CBC W/ AUTO DIFFERENTIAL - Abnormal; Notable for the following components:       Result Value    RBC 3.34 (*)     Hemoglobin 8.8 (*)     Hematocrit 28.6 (*)     Mean Corpuscular Hemoglobin 26.3 (*)     Mean Corpuscular Hemoglobin Conc 30.8 (*)     Immature Granulocytes 0.8 (*)     Immature Grans (Abs) 0.07 (*)     Lymph # 0.8 (*)     Gran% 77.3 (*)     Lymph% 9.0 (*)     All other components within normal limits    COMPREHENSIVE METABOLIC PANEL - Abnormal; Notable for the following components:    Sodium 132 (*)     CO2 19 (*)     BUN, Bld 53 (*)     Creatinine 4.0 (*)     Calcium 8.4 (*)     Albumin 2.1 (*)     Alkaline Phosphatase 244 (*)     AST 41 (*)     eGFR if  18 (*)     eGFR if non  16 (*)     All other components within normal limits   TROPONIN I - Abnormal; Notable for the following components:    Troponin I 0.118 (*)     All other components within normal limits   B-TYPE NATRIURETIC PEPTIDE - Abnormal; Notable for the following components:     (*)     All other components within normal limits   D DIMER, QUANTITATIVE   PROTIME-INR   TSH   VANCOMYCIN, RANDOM   POCT GLUCOSE MONITORING CONTINUOUS          Imaging Results          US Upper Extremity Veins Right (In process)                X-Ray Chest AP Portable (Final result)  Result time 08/03/20 16:23:08    Final result by Rogelio Ellsworth MD (08/03/20 16:23:08)                 Impression:      1. Central pulmonary vascular congestion associated with interstitial edema, likely representing CHF or pulmonary edema.  Superimposed infectious process not excluded.      Electronically signed by: Rogelio Ellsworth MD  Date:    08/03/2020  Time:    16:23             Narrative:    EXAMINATION:  XR CHEST AP PORTABLE    CLINICAL HISTORY:  Shortness of Breath;    TECHNIQUE:  Single frontal view of the chest was performed.    COMPARISON:  Chest 07/20/2020    FINDINGS:  There is enlargement of the cardiac silhouette.  There is presence of a right PICC line in unchanged position.    Since the previous examination there is significant central pulmonary vascular congestion with prominence of the interstitial markings, suspicious for CHF or pulmonary edema.  Superimposed infectious process not excluded.  There is no pleural effusions.    Postoperative changes from cervical disc prosthesis in the lower cervical spine noted.  There are cardiac monitoring  leads over the chest.                                 Medical Decision Making:   Initial Assessment:   55 year old male presents to the ED complaining of abdominal distention, bilateral leg swelling, right arm swelling, scrotal swelling, and shortness of breath with exertion. The nursing note and vital signs were reviewed. I examined the patient. Secondary to the patient's symptoms, I ordered a TSH, D dimer, protime-INR, CBC, CMP, troponin, POCT glucose, and B-type BNP. An EKG and chest x-ray were also ordered.   Clinical Tests:   Lab Tests: Ordered and Reviewed  Radiological Study: Ordered and Reviewed  Medical Tests: Ordered and Reviewed    Patient recently discharged from the hospital.  He was coronavirus positive.  He was given steroids while in the hospital.  Possibly edema secondary to steroid use.  His chest x-ray looks like pulmonary edema.  However this may also be related to changes from the coronavirus.  His oxygen saturations are 90% sitting upright.  He is tachypneic.  He has chronic stage 3 kidney disease with a creatinine of 4.00 which is slightly increased from his baseline.  Will admit, try to diurese, consult Nephrology, obtain a 2D cardiac echo to evaluate for potential heart failure that would be new onset.  Patient had a normal cardiac echo in January of 2020.  Give supplemental oxygen.  Spoke with Dr. Sanchez who accepted the patient.  He asked me to write the admit orders.  Will continue the Zosyn and vancomycin with renal dosing and consult pharmacy.  Patient's troponin is elevated at 0.118.  Will trend the troponins.  Will treat potential non-STEMI with aspirin and full-dose renally dosed Lovenox.  Will hold beta-blocker secondary to tension acute heart failure.  Will hold ACE/ARB secondary to kidney dysfunction.          Scribe Attestation:   Scribe #1: I performed the above scribed service and the documentation accurately describes the services I performed. I attest to the accuracy of the  note.                          Clinical Impression:       ICD-10-CM ICD-9-CM   1. Acute congestive heart failure, unspecified heart failure type  I50.9 428.0   2. Edema  R60.9 782.3   3. Anasarca  R60.1 782.3   4. Dyspnea, unspecified type  R06.00 786.09             ED Disposition Condition    Admit            Scribe Attestation: I, Francisco Michelle, personally performed the services described in this documentation. All medical record entries made by the scribe were at my direction and in my presence. I have reviewed the chart and agree that the record reflects my personal performance and is accurate and complete.               Francisco Michelle MD  08/03/20 [id number]

## 2020-08-03 NOTE — PROVIDER PROGRESS NOTES - EMERGENCY DEPT.
Emergency Department TeleTRIAGE Encounter Note      CHIEF COMPLAINT    Chief Complaint   Patient presents with    Shortness of Breath     started  yesterday    Bloated    Leg Swelling     edema getting worse since starting abx    Wheezing     started yesterday       VITAL SIGNS   Initial Vitals [08/03/20 1302]   BP Pulse Resp Temp SpO2   (!) 169/91 97 20 98.9 °F (37.2 °C) 95 %      MAP       --            ALLERGIES    Review of patient's allergies indicates:  No Known Allergies    PROVIDER TRIAGE NOTE  This is a teletriage evaluation of a 55 y.o. male presenting to the ED with c/o shortness of breath/wheezing. Also reports generalized edema.  Currently on IV antibiotics via PICC line.  Initial orders will be placed and care will be transferred to an alternate provider when patient is roomed for a full evaluation. Any additional orders and the final disposition will be determined by that provider.         ORDERS  Labs Reviewed   CBC W/ AUTO DIFFERENTIAL   COMPREHENSIVE METABOLIC PANEL   TROPONIN I   B-TYPE NATRIURETIC PEPTIDE       ED Orders (720h ago, onward)    Start Ordered     Status Ordering Provider    08/03/20 1309 08/03/20 1308  Vital signs  Every 15 min      Ordered DENISE YE    08/03/20 1309 08/03/20 1308  Cardiac Monitoring - Adult  Continuous     Comments: Notify Physician If:    Ordered DENISE YE    08/03/20 1309 08/03/20 1308  Pulse Oximetry Continuous  Continuous      Ordered DENISE YE    08/03/20 1309 08/03/20 1308  Diet NPO  Diet effective now      Ordered DENISE YE    08/03/20 1309 08/03/20 1308  Saline lock IV  Once      Ordered DENISE YE    08/03/20 1309 08/03/20 1308  EKG 12-lead  Once     Comments: Do not perform if previously done during this visit/ triage    Ordered DENISE YE    08/03/20 1309 08/03/20 1308  CBC auto differential  STAT  Collect    Ordered DENISE YE    08/03/20 1309 08/03/20 1308  Comprehensive metabolic panel  STAT  Collect     Ordered DENISE YE    08/03/20 1309 08/03/20 1308  Troponin I #1  STAT  Collect    Ordered DENISE YE    08/03/20 1309 08/03/20 1308  B-Type natriuretic peptide (BNP)  STAT  Collect    Ordered DENISE YE    08/03/20 1309 08/03/20 1308  X-Ray Chest AP Portable  1 time imaging      Ordered DENISE YE    08/03/20 1309 08/03/20 1308  POCT glucose  Once      Ordered DENISE YE            Virtual Visit Note: The provider triage portion of this emergency department evaluation and documentation was performed via [employer], a HIPAA-compliant telemedicine application, in concert with a tele-presenter in the room. A face to face patient evaluation with one of my colleagues will occur once the patient is placed in an emergency department room.      DISCLAIMER: This note was prepared with [employer] voice recognition transcription software. Garbled syntax, mangled pronouns, and other bizarre constructions may be attributed to that software system.

## 2020-08-04 ENCOUNTER — DOCUMENT SCAN (OUTPATIENT)
Dept: HOME HEALTH SERVICES | Facility: HOSPITAL | Age: 55
End: 2020-08-04
Payer: COMMERCIAL

## 2020-08-04 LAB
AORTIC ROOT ANNULUS: 3.75 CM
AORTIC VALVE CUSP SEPERATION: 2.77 CM
ASCENDING AORTA: 3.04 CM
AV INDEX (PROSTH): 0.76
AV MEAN GRADIENT: 6 MMHG
AV PEAK GRADIENT: 11 MMHG
AV VALVE AREA: 3.08 CM2
AV VELOCITY RATIO: 0.69
BACTERIA #/AREA URNS HPF: NORMAL /HPF
BILIRUB UR QL STRIP: NEGATIVE
BSA FOR ECHO PROCEDURE: 2.65 M2
CLARITY UR: CLEAR
COLOR UR: COLORLESS
CORTIS SERPL-MCNC: 9.5 UG/DL
CREAT UR-MCNC: 21 MG/DL (ref 23–375)
CV ECHO LV RWT: 0.57 CM
DOP CALC AO PEAK VEL: 1.63 M/S
DOP CALC AO VTI: 28.4 CM
DOP CALC LVOT AREA: 4 CM2
DOP CALC LVOT DIAMETER: 2.27 CM
DOP CALC LVOT PEAK VEL: 1.13 M/S
DOP CALC LVOT STROKE VOLUME: 87.53 CM3
DOP CALCLVOT PEAK VEL VTI: 21.64 CM
E WAVE DECELERATION TIME: 156.97 MSEC
E/A RATIO: 1.71
E/E' RATIO: 9.5 M/S
ECHO LV POSTERIOR WALL: 1.31 CM (ref 0.6–1.1)
EOSINOPHIL URNS QL WRIGHT STN: NORMAL
FRACTIONAL SHORTENING: 36 % (ref 28–44)
GLUCOSE UR QL STRIP: NEGATIVE
HGB UR QL STRIP: ABNORMAL
HYALINE CASTS #/AREA URNS LPF: 0 /LPF
INTERVENTRICULAR SEPTUM: 1.22 CM (ref 0.6–1.1)
IVRT: 53.06 MSEC
KETONES UR QL STRIP: NEGATIVE
LA MAJOR: 6.24 CM
LA MINOR: 6.08 CM
LA WIDTH: 3.88 CM
LEFT ATRIUM SIZE: 3.87 CM
LEFT ATRIUM VOLUME INDEX: 30.8 ML/M2
LEFT ATRIUM VOLUME: 78.61 CM3
LEFT INTERNAL DIMENSION IN SYSTOLE: 2.93 CM (ref 2.1–4)
LEFT VENTRICLE DIASTOLIC VOLUME INDEX: 38.09 ML/M2
LEFT VENTRICLE DIASTOLIC VOLUME: 97.34 ML
LEFT VENTRICLE MASS INDEX: 87 G/M2
LEFT VENTRICLE SYSTOLIC VOLUME INDEX: 12.9 ML/M2
LEFT VENTRICLE SYSTOLIC VOLUME: 33.08 ML
LEFT VENTRICULAR INTERNAL DIMENSION IN DIASTOLE: 4.6 CM (ref 3.5–6)
LEFT VENTRICULAR MASS: 221.19 G
LEUKOCYTE ESTERASE UR QL STRIP: NEGATIVE
LV LATERAL E/E' RATIO: 8.87 M/S
LV SEPTAL E/E' RATIO: 10.23 M/S
MICROSCOPIC COMMENT: NORMAL
MV PEAK A VEL: 0.78 M/S
MV PEAK E VEL: 1.33 M/S
MV STENOSIS PRESSURE HALF TIME: 45.52 MS
MV VALVE AREA P 1/2 METHOD: 4.83 CM2
NITRITE UR QL STRIP: NEGATIVE
PH UR STRIP: 5 [PH] (ref 5–8)
PISA TR MAX VEL: 2.75 M/S
POCT GLUCOSE: 114 MG/DL (ref 70–110)
POCT GLUCOSE: 68 MG/DL (ref 70–110)
POCT GLUCOSE: 79 MG/DL (ref 70–110)
POCT GLUCOSE: 86 MG/DL (ref 70–110)
POCT GLUCOSE: 90 MG/DL (ref 70–110)
POCT GLUCOSE: 94 MG/DL (ref 70–110)
PROT UR QL STRIP: ABNORMAL
PROT UR-MCNC: 52 MG/DL
PROT/CREAT UR: 2.48 MG/G{CREAT} (ref 0–0.2)
PULM VEIN S/D RATIO: 1.23
PV PEAK D VEL: 0.7 M/S
PV PEAK S VEL: 0.86 M/S
PV PEAK VELOCITY: 1.15 CM/S
RA MAJOR: 5.73 CM
RA PRESSURE: 3 MMHG
RA WIDTH: 2.58 CM
RBC #/AREA URNS HPF: 1 /HPF (ref 0–4)
RIGHT VENTRICULAR END-DIASTOLIC DIMENSION: 3.39 CM
RV TISSUE DOPPLER FREE WALL SYSTOLIC VELOCITY 1 (APICAL 4 CHAMBER VIEW): 17.82 CM/S
SINUS: 3.51 CM
SP GR UR STRIP: 1 (ref 1–1.03)
STJ: 2.84 CM
TDI LATERAL: 0.15 M/S
TDI SEPTAL: 0.13 M/S
TDI: 0.14 M/S
TR MAX PG: 30 MMHG
TRICUSPID ANNULAR PLANE SYSTOLIC EXCURSION: 2.43 CM
TROPONIN I SERPL DL<=0.01 NG/ML-MCNC: 0.11 NG/ML (ref 0–0.03)
TROPONIN I SERPL DL<=0.01 NG/ML-MCNC: 0.13 NG/ML (ref 0–0.03)
TV REST PULMONARY ARTERY PRESSURE: 33 MMHG
URN SPEC COLLECT METH UR: ABNORMAL
UROBILINOGEN UR STRIP-ACNC: NEGATIVE EU/DL
VANCOMYCIN SERPL-MCNC: 18.7 UG/ML
WBC #/AREA URNS HPF: 1 /HPF (ref 0–5)

## 2020-08-04 PROCEDURE — 84484 ASSAY OF TROPONIN QUANT: CPT | Mod: 91

## 2020-08-04 PROCEDURE — 80202 ASSAY OF VANCOMYCIN: CPT

## 2020-08-04 PROCEDURE — 25000003 PHARM REV CODE 250: Performed by: HOSPITALIST

## 2020-08-04 PROCEDURE — 84156 ASSAY OF PROTEIN URINE: CPT

## 2020-08-04 PROCEDURE — 63600175 PHARM REV CODE 636 W HCPCS: Performed by: EMERGENCY MEDICINE

## 2020-08-04 PROCEDURE — 83935 ASSAY OF URINE OSMOLALITY: CPT

## 2020-08-04 PROCEDURE — 36415 COLL VENOUS BLD VENIPUNCTURE: CPT

## 2020-08-04 PROCEDURE — 99222 1ST HOSP IP/OBS MODERATE 55: CPT | Mod: ,,, | Performed by: PODIATRIST

## 2020-08-04 PROCEDURE — 11000001 HC ACUTE MED/SURG PRIVATE ROOM

## 2020-08-04 PROCEDURE — 27000221 HC OXYGEN, UP TO 24 HOURS

## 2020-08-04 PROCEDURE — 81000 URINALYSIS NONAUTO W/SCOPE: CPT

## 2020-08-04 PROCEDURE — 87205 SMEAR GRAM STAIN: CPT

## 2020-08-04 PROCEDURE — 94761 N-INVAS EAR/PLS OXIMETRY MLT: CPT

## 2020-08-04 PROCEDURE — 63600175 PHARM REV CODE 636 W HCPCS: Performed by: HOSPITALIST

## 2020-08-04 PROCEDURE — 99222 PR INITIAL HOSPITAL CARE,LEVL II: ICD-10-PCS | Mod: ,,, | Performed by: PODIATRIST

## 2020-08-04 PROCEDURE — 25000003 PHARM REV CODE 250: Performed by: EMERGENCY MEDICINE

## 2020-08-04 RX ORDER — HEPARIN SODIUM 5000 [USP'U]/ML
5000 INJECTION, SOLUTION INTRAVENOUS; SUBCUTANEOUS EVERY 8 HOURS
Status: DISCONTINUED | OUTPATIENT
Start: 2020-08-04 | End: 2020-08-04

## 2020-08-04 RX ORDER — GLUCAGON 1 MG
1 KIT INJECTION
Status: DISCONTINUED | OUTPATIENT
Start: 2020-08-04 | End: 2020-08-07 | Stop reason: HOSPADM

## 2020-08-04 RX ORDER — ACETAMINOPHEN 325 MG/1
650 TABLET ORAL EVERY 6 HOURS PRN
Status: DISCONTINUED | OUTPATIENT
Start: 2020-08-04 | End: 2020-08-07 | Stop reason: HOSPADM

## 2020-08-04 RX ORDER — CLONIDINE HYDROCHLORIDE 0.1 MG/1
0.2 TABLET ORAL EVERY 4 HOURS PRN
Status: DISCONTINUED | OUTPATIENT
Start: 2020-08-04 | End: 2020-08-07 | Stop reason: HOSPADM

## 2020-08-04 RX ORDER — IBUPROFEN 200 MG
24 TABLET ORAL
Status: DISCONTINUED | OUTPATIENT
Start: 2020-08-04 | End: 2020-08-07 | Stop reason: HOSPADM

## 2020-08-04 RX ORDER — HEPARIN SODIUM 5000 [USP'U]/ML
5000 INJECTION, SOLUTION INTRAVENOUS; SUBCUTANEOUS EVERY 8 HOURS
Status: DISCONTINUED | OUTPATIENT
Start: 2020-08-04 | End: 2020-08-07 | Stop reason: HOSPADM

## 2020-08-04 RX ORDER — IBUPROFEN 200 MG
16 TABLET ORAL
Status: DISCONTINUED | OUTPATIENT
Start: 2020-08-04 | End: 2020-08-07 | Stop reason: HOSPADM

## 2020-08-04 RX ORDER — INSULIN ASPART 100 [IU]/ML
0-5 INJECTION, SOLUTION INTRAVENOUS; SUBCUTANEOUS
Status: DISCONTINUED | OUTPATIENT
Start: 2020-08-04 | End: 2020-08-07 | Stop reason: HOSPADM

## 2020-08-04 RX ADMIN — HYDRALAZINE HYDROCHLORIDE 50 MG: 25 TABLET ORAL at 02:08

## 2020-08-04 RX ADMIN — HYDRALAZINE HYDROCHLORIDE 50 MG: 25 TABLET ORAL at 06:08

## 2020-08-04 RX ADMIN — HEPARIN SODIUM 5000 UNITS: 5000 INJECTION INTRAVENOUS; SUBCUTANEOUS at 09:08

## 2020-08-04 RX ADMIN — ACETAMINOPHEN 650 MG: 325 TABLET ORAL at 09:08

## 2020-08-04 RX ADMIN — VANCOMYCIN HYDROCHLORIDE 500 MG: 500 INJECTION, POWDER, LYOPHILIZED, FOR SOLUTION INTRAVENOUS at 12:08

## 2020-08-04 RX ADMIN — FUROSEMIDE 40 MG: 10 INJECTION, SOLUTION INTRAMUSCULAR; INTRAVENOUS at 08:08

## 2020-08-04 RX ADMIN — CLONIDINE HYDROCHLORIDE 0.2 MG: 0.1 TABLET ORAL at 01:08

## 2020-08-04 RX ADMIN — Medication 16 G: at 11:08

## 2020-08-04 RX ADMIN — PIPERACILLIN SODIUM, TAZOBACTAM SODIUM 4.5 G: 4; .5 INJECTION, POWDER, LYOPHILIZED, FOR SOLUTION INTRAVENOUS at 02:08

## 2020-08-04 RX ADMIN — ASPIRIN 325 MG: 325 TABLET, DELAYED RELEASE ORAL at 08:08

## 2020-08-04 RX ADMIN — DEXTROSE MONOHYDRATE 12.5 G: 25 INJECTION, SOLUTION INTRAVENOUS at 08:08

## 2020-08-04 RX ADMIN — HYDRALAZINE HYDROCHLORIDE 50 MG: 25 TABLET ORAL at 09:08

## 2020-08-04 RX ADMIN — ACETAMINOPHEN 650 MG: 325 TABLET ORAL at 01:08

## 2020-08-04 RX ADMIN — PIPERACILLIN SODIUM, TAZOBACTAM SODIUM 4.5 G: 4; .5 INJECTION, POWDER, LYOPHILIZED, FOR SOLUTION INTRAVENOUS at 01:08

## 2020-08-04 NOTE — SUBJECTIVE & OBJECTIVE
Past Medical History:   Diagnosis Date    CKD (chronic kidney disease), stage III 07/16/2020    CKD stage 3 due to type 1 diabetes mellitus     CKD stage 3 due to type 2 diabetes mellitus     Diabetes mellitus, type 2     Diabetic foot ulcer associated with diabetes mellitus due to underlying condition 07/16/2020    Diabetic foot ulcers     Edema 08/03/2020    generalized, including genital area    Hyperlipidemia     Hypertension     Obese        Past Surgical History:   Procedure Laterality Date    BONE BIOPSY Left 7/17/2020    Procedure: BIOPSY, BONE;  Surgeon: Veorna Whitmore DPM;  Location: [facility] OR;  Service: Podiatry;  Laterality: Left;    CERVICAL DISC SURGERY  07/11/2016    DEBRIDEMENT Left 7/17/2020    Procedure: DEBRIDEMENT;  Surgeon: Verona Whitmore DPM;  Location: [facility] OR;  Service: Podiatry;  Laterality: Left;    DEBRIDEMENT OF FOOT Right     toes & plantar surface    FRACTURE SURGERY Right     medial ankle, metal plate present    left leg orthopedic surgery Left        Review of patient's allergies indicates:  No Known Allergies    No current facility-administered medications on file prior to encounter.      Current Outpatient Medications on File Prior to Encounter   Medication Sig    amLODIPine (NORVASC) 10 MG tablet Take 1 tablet (10 mg total) by mouth once daily.    atorvastatin (LIPITOR) 40 MG tablet Take 1 tablet (40 mg total) by mouth once daily.    blood sugar diagnostic Strp 1 strip by Misc.(Non-Drug; Combo Route) route 3 (three) times daily. Patient needs One Touch Test Strips (Berio).    carvediloL (COREG) 25 MG tablet Take 1 tablet (25 mg total) by mouth 2 (two) times daily.    gabapentin (NEURONTIN) 300 MG capsule Take 1 capsule (300 mg total) by mouth 2 (two) times daily.    hydrALAZINE (APRESOLINE) 50 MG tablet Take 1 tablet (50 mg total) by mouth every 8 (eight) hours.    insulin aspart U-100 (NOVOLOG) 100 unit/mL (3 mL) InPn pen Inject 10 Units into the skin 3  "(three) times daily with meals.    insulin detemir U-100 (LEVEMIR FLEXTOUCH) 100 unit/mL (3 mL) SubQ InPn pen Inject 20 Units into the skin 2 (two) times daily.    pen needle, diabetic 32 gauge x 3/16" Ndle 1 each by Misc.(Non-Drug; Combo Route) route 5 (five) times daily.    TRULICITY 0.75 mg/0.5 mL PnIj INJECT 0.5 ML UNDER THE SKIN EVERY 7 DAYS     Family History     Problem Relation (Age of Onset)    Heart disease Father        Tobacco Use    Smoking status: Never Smoker    Smokeless tobacco: Never Used   Substance and Sexual Activity    Alcohol use: Not Currently     Alcohol/week: 0.0 standard drinks     Comment: social    Drug use: No    Sexual activity: Not on file     Review of Systems   Constitutional: Negative for chills and fever.   Eyes: Negative for photophobia and visual disturbance.   Respiratory: Positive for shortness of breath and wheezing. Negative for cough.    Cardiovascular: Positive for leg swelling. Negative for chest pain and palpitations.   Gastrointestinal: Positive for abdominal distention. Negative for abdominal pain, diarrhea, nausea and vomiting.   Genitourinary: Positive for scrotal swelling. Negative for frequency, hematuria and urgency.   Skin: Negative for pallor, rash and wound.   Neurological: Negative for light-headedness and headaches.   Psychiatric/Behavioral: Negative for confusion and decreased concentration.     Objective:     Vital Signs (Most Recent):  Temp: 98 °F (36.7 °C) (08/03/20 1605)  Pulse: 94 (08/03/20 1605)  Resp: (!) 27 (08/03/20 1605)  BP: (!) 161/83 (08/03/20 1605)  SpO2: (!) 91 % (08/03/20 1605) Vital Signs (24h Range):  Temp:  [98 °F (36.7 °C)-98.9 °F (37.2 °C)] 98 °F (36.7 °C)  Pulse:  [94-97] 94  Resp:  [20-27] 27  SpO2:  [91 %-95 %] 91 %  BP: (161-169)/(83-91) 161/83     Weight: 112.9 kg (249 lb)  Body mass index is 32.85 kg/m².    Physical Exam  Vitals signs and nursing note reviewed.   Constitutional:       General: He is not in acute distress.   "   Appearance: He is well-developed.   HENT:      Head: Normocephalic and atraumatic.      Right Ear: External ear normal.      Left Ear: External ear normal.      Nose: Nose normal.   Eyes:      Conjunctiva/sclera: Conjunctivae normal.      Pupils: Pupils are equal, round, and reactive to light.   Neck:      Musculoskeletal: Normal range of motion and neck supple.   Cardiovascular:      Rate and Rhythm: Normal rate and regular rhythm.   Pulmonary:      Effort: Tachypnea and accessory muscle usage present. No respiratory distress.      Breath sounds: Decreased breath sounds present. No wheezing or rales.   Abdominal:      General: Abdomen is protuberant. Bowel sounds are normal. There is no distension.      Palpations: Abdomen is soft.      Tenderness: There is no abdominal tenderness.      Comments: No palpable hepatomegaly or splenomegaly   Musculoskeletal: Normal range of motion.         General: No tenderness.      Right lower leg: Edema present.      Left lower leg: Edema present.   Skin:     General: Skin is warm and dry.   Neurological:      Mental Status: He is alert and oriented to person, place, and time.   Psychiatric:         Thought Content: Thought content normal.           CRANIAL NERVES     CN III, IV, VI   Pupils are equal, round, and reactive to light.       Significant Labs: All pertinent labs within the past 24 hours have been reviewed.    Significant Imaging: I have reviewed all pertinent imaging results/findings within the past 24 hours.

## 2020-08-04 NOTE — HOSPITAL COURSE
55 y.o. male with hypertension, hyperlipidemia, dm 2, CKD stage 3, osteomyelitis of left foot on home IV antibiotics via PICC line, and recent diagnosis of COVID-19 infection presents with a complaint of shortness of breath.  Acute onset day before admission,, associated with extensive edema to the bilateral lower extremities up to the level of the abdomen and arms with abdominal distension.  SOB exacerbated with exertion.  Denies fever, chills, cough, chest pain, palpitations, dizziness, syncope, nausea, vomiting, diarrhea, abdominal pain, bloody black stool, or dysuria.  In the ED, chest x-ray reveals likely pulmonary edema; however, changes secondary to COVID-19 infection not excluded.  Labs show acute renal failure on CKD ,creatinine 4.0, increased above baseline.  He was found to be mildly hypoxic on room air requiring supplemental oxygen.DVT studies was negative,  Troponin also elevated at 0.118.  ASA, full-dose Lovenox, and IV Lasix initiated.  Admitted to hospital medicine for further evaluation and treatment.echo show preserved EF,,he was stabe on 2 liter NC O 2,nephroplogy was consulted for worsening CKD.  24 hours urine protein show protein level 4490.was on IV lasix for fluid overload.he had some improvement in his CRT,worsening CKD likely duo to massive proteinuria,he will follow up with nephrology as out patient.he had woud care by podiatry on his left foot and will follow up with podiatry as out patient.  Patient was discharged home with HH to continue IV Abx with zosyn until 9.1.20 same as before,also lasix was prescribed.he will follow up with PCP,nephrology,podiary as out patient.

## 2020-08-04 NOTE — CONSULTS
Nursing reports patient has dressing changed left foot 2x a week by   Chart reviewed and noted patient visited Dr. Martinez in outpatient [facility] 7/31 and has appointment for follow up 8/7/20. [facility] following and changing dressing every Monday and Wednesday with gentian violet to periwound, Francesca to wound base, covered with Hydrofera Blue Transfer, Mextra, Eran foam long x 1, and calamine CoFlex. Plans to change to NPWT.   A 55 year old male admitted 8/3/20 from home with acute respiratory failure with hypoxia; elevated troponin; osteomyelitis of left foot; acute kidney injury superimposed on chronic kidney disease; hyponatremia; COVID-19 virus infection  + test 7/16/20; HLD; HTN; uncontrolled DM II with Stage 3 CKD  8/3 WBC 8.46 Hgb 8.8 Hct 28.6 Alb 2.1  Informed Dr. Sanchez of patient being managed by Dr. Martinez, Podiatry.   Will assist nursing as needed with wound management.

## 2020-08-04 NOTE — NURSING
Patient arrived to floor, AAOx3. Came with PICC in place, currently receiving IV antibiotics at home. Ambulated to restroom. Patient has walking boot on to left foot. Patient has compression stockings on BLE. Dressing to left foot with boot on. Patient receives wound care to left foot twice a week per home health. Tele box [id number] applied with continuous pulse ox. Patient on 2 liters O2. Current orders reviewed with patient.

## 2020-08-04 NOTE — ASSESSMENT & PLAN NOTE
Positive test 07/16/2020, symptoms had resolved shortly thereafter. , but no fever or cough.  Continue isolation for now.

## 2020-08-04 NOTE — PROGRESS NOTES
Pharmacokinetic Initial Assessment: IV Vancomycin    Assessment/Plan:    Initiate intravenous vancomycin with loading dose of 1750 mg once with subsequent doses when random concentrations are less than 20 mcg/mL  Desired empiric serum trough concentration is 10 to 20 mcg/mL  Draw vancomycin random level on 8/4/20 at 08:00.  Pharmacy will continue to follow and monitor vancomycin.      Please contact pharmacy at extension [phone] with any questions regarding this assessment.     Thank you for the consult,   Dolores Morgan       Patient brief summary:  Catalino Mcfadden is a 55 y.o. male initiated on antimicrobial therapy with IV Vancomycin for treatment of suspected skin & soft tissue infection    Drug Allergies:   Review of patient's allergies indicates:  No Known Allergies    Actual Body Weight:   136 kg    Renal Function:   Estimated Creatinine Clearance: 29.8 mL/min (A) (based on SCr of 4 mg/dL (H)).,     Dialysis Method (if applicable):  N/A    CBC (last 72 hours):  Recent Labs   Lab Result Units 08/03/20  1034 08/03/20  1426   WBC K/uL 7.18 8.46   Hemoglobin g/dL 8.4* 8.8*   Hematocrit % 27.4* 28.6*   Platelets K/uL 232 227   Gran% % 69.1 77.3*   Lymph% % 14.2* 9.0*   Mono% % 14.9 11.9   Eosinophil% % 1.0 0.5   Basophil% % 0.4 0.5   Differential Method  Automated Automated       Metabolic Panel (last 72 hours):  Recent Labs   Lab Result Units 08/03/20  1034 08/03/20  1426   Sodium mmol/L 134* 132*   Potassium mmol/L 4.9 4.7   Chloride mmol/L 105 105   CO2 mmol/L 22* 19*   Glucose mg/dL 56* 93   BUN, Bld mg/dL 52* 53*   Creatinine mg/dL 3.9* 4.0*   Albumin g/dL 2.1* 2.1*   Total Bilirubin mg/dL 0.3 0.4   Alkaline Phosphatase U/L 245* 244*   AST U/L 37 41*   ALT U/L 33 34       Drug levels (last 3 results):  Recent Labs   Lab Result Units 08/03/20  1426   Vancomycin, Random ug/mL <1.1       Microbiologic Results:  Microbiology Results (last 7 days)       ** No results found for the last 168 hours. **

## 2020-08-04 NOTE — H&P
[facility] Hospital Medicine  History & Physical    Patient Name: Catalino Mcfdaden  MRN: [id number]  Admission Date: 8/3/2020  Attending Physician: No att. providers found   Primary Care Provider: Azikiwe K Lombard, MD         Patient information was obtained from patient, past medical records and ER records.     Subjective:     Principal Problem:Acute respiratory failure with hypoxia    Chief Complaint:   Chief Complaint   Patient presents with    Shortness of Breath     started  yesterday    Bloated    Leg Swelling     edema getting worse since starting abx    Wheezing     started yesterday        HPI: 55 y.o. male with hypertension, hyperlipidemia, dm 2, CKD stage 3, osteomyelitis of left foot on home IV antibiotics via PICC line, and recent diagnosis of COVID-19 infection presents with a complaint of shortness of breath.  Acute onset yesterday, associated with extensive edema to the bilateral lower extremities up to the level of the abdomen and arms with abdominal distension.  SOB exacerbated with exertion.  Denies fever, chills, cough, chest pain, palpitations, dizziness, syncope, nausea, vomiting, diarrhea, abdominal pain, bloody black stool, or dysuria.  In the ED, chest x-ray reveals likely pulmonary edema; however, changes secondary to COVID-19 infection not excluded.  Labs show acute kidney injury creatinine 4.0, increased above baseline.  He was found to be mildly hypoxic on room air requiring supplemental oxygen.  Troponin also elevated at 0.118.  ASA, full-dose Lovenox, and IV Lasix initiated.  Admitted to hospital medicine for further evaluation and treatment.    Past Medical History:   Diagnosis Date    CKD (chronic kidney disease), stage III 07/16/2020    CKD stage 3 due to type 1 diabetes mellitus     CKD stage 3 due to type 2 diabetes mellitus     Diabetes mellitus, type 2     Diabetic foot ulcer associated with diabetes mellitus due to underlying condition 07/16/2020     "Diabetic foot ulcers     Edema 08/03/2020    generalized, including genital area    Hyperlipidemia     Hypertension     Obese        Past Surgical History:   Procedure Laterality Date    BONE BIOPSY Left 7/17/2020    Procedure: BIOPSY, BONE;  Surgeon: Verona Whitmore DPM;  Location: [facility] OR;  Service: Podiatry;  Laterality: Left;    CERVICAL DISC SURGERY  07/11/2016    DEBRIDEMENT Left 7/17/2020    Procedure: DEBRIDEMENT;  Surgeon: Verona Whitmore DPM;  Location: [facility] OR;  Service: Podiatry;  Laterality: Left;    DEBRIDEMENT OF FOOT Right     toes & plantar surface    FRACTURE SURGERY Right     medial ankle, metal plate present    left leg orthopedic surgery Left        Review of patient's allergies indicates:  No Known Allergies    No current facility-administered medications on file prior to encounter.      Current Outpatient Medications on File Prior to Encounter   Medication Sig    amLODIPine (NORVASC) 10 MG tablet Take 1 tablet (10 mg total) by mouth once daily.    atorvastatin (LIPITOR) 40 MG tablet Take 1 tablet (40 mg total) by mouth once daily.    blood sugar diagnostic Strp 1 strip by Misc.(Non-Drug; Combo Route) route 3 (three) times daily. Patient needs One Touch Test Strips (Berio).    carvediloL (COREG) 25 MG tablet Take 1 tablet (25 mg total) by mouth 2 (two) times daily.    gabapentin (NEURONTIN) 300 MG capsule Take 1 capsule (300 mg total) by mouth 2 (two) times daily.    hydrALAZINE (APRESOLINE) 50 MG tablet Take 1 tablet (50 mg total) by mouth every 8 (eight) hours.    insulin aspart U-100 (NOVOLOG) 100 unit/mL (3 mL) InPn pen Inject 10 Units into the skin 3 (three) times daily with meals.    insulin detemir U-100 (LEVEMIR FLEXTOUCH) 100 unit/mL (3 mL) SubQ InPn pen Inject 20 Units into the skin 2 (two) times daily.    pen needle, diabetic 32 gauge x 3/16" Ndle 1 each by Misc.(Non-Drug; Combo Route) route 5 (five) times daily.    TRULICITY 0.75 mg/0.5 mL PnIj INJECT 0.5 ML " UNDER THE SKIN EVERY 7 DAYS     Family History     Problem Relation (Age of Onset)    Heart disease Father        Tobacco Use    Smoking status: Never Smoker    Smokeless tobacco: Never Used   Substance and Sexual Activity    Alcohol use: Not Currently     Alcohol/week: 0.0 standard drinks     Comment: social    Drug use: No    Sexual activity: Not on file     Review of Systems   Constitutional: Negative for chills and fever.   Eyes: Negative for photophobia and visual disturbance.   Respiratory: Positive for shortness of breath and wheezing. Negative for cough.    Cardiovascular: Positive for leg swelling. Negative for chest pain and palpitations.   Gastrointestinal: Positive for abdominal distention. Negative for abdominal pain, diarrhea, nausea and vomiting.   Genitourinary: Positive for scrotal swelling. Negative for frequency, hematuria and urgency.   Skin: Negative for pallor, rash and wound.   Neurological: Negative for light-headedness and headaches.   Psychiatric/Behavioral: Negative for confusion and decreased concentration.     Objective:     Vital Signs (Most Recent):  Temp: 98 °F (36.7 °C) (08/03/20 1605)  Pulse: 94 (08/03/20 1605)  Resp: (!) 27 (08/03/20 1605)  BP: (!) 161/83 (08/03/20 1605)  SpO2: (!) 91 % (08/03/20 1605) Vital Signs (24h Range):  Temp:  [98 °F (36.7 °C)-98.9 °F (37.2 °C)] 98 °F (36.7 °C)  Pulse:  [94-97] 94  Resp:  [20-27] 27  SpO2:  [91 %-95 %] 91 %  BP: (161-169)/(83-91) 161/83     Weight: 112.9 kg (249 lb)  Body mass index is 32.85 kg/m².    Physical Exam  Vitals signs and nursing note reviewed.   Constitutional:       General: He is not in acute distress.     Appearance: He is well-developed.   HENT:      Head: Normocephalic and atraumatic.      Right Ear: External ear normal.      Left Ear: External ear normal.      Nose: Nose normal.   Eyes:      Conjunctiva/sclera: Conjunctivae normal.      Pupils: Pupils are equal, round, and reactive to light.   Neck:       Musculoskeletal: Normal range of motion and neck supple.   Cardiovascular:      Rate and Rhythm: Normal rate and regular rhythm.   Pulmonary:      Effort: Tachypnea and accessory muscle usage present. No respiratory distress.      Breath sounds: Decreased breath sounds present. No wheezing or rales.   Abdominal:      General: Abdomen is protuberant. Bowel sounds are normal. There is no distension.      Palpations: Abdomen is soft.      Tenderness: There is no abdominal tenderness.      Comments: No palpable hepatomegaly or splenomegaly   Musculoskeletal: Normal range of motion.         General: No tenderness.      Right lower leg: Edema present.      Left lower leg: Edema present.   Skin:     General: Skin is warm and dry.   Neurological:      Mental Status: He is alert and oriented to person, place, and time.   Psychiatric:         Thought Content: Thought content normal.           CRANIAL NERVES     CN III, IV, VI   Pupils are equal, round, and reactive to light.       Significant Labs: All pertinent labs within the past 24 hours have been reviewed.    Significant Imaging: I have reviewed all pertinent imaging results/findings within the past 24 hours.    Assessment/Plan:     * Acute respiratory failure with hypoxia  Likely secondary to acute pulmonary edema, continue supplemental oxygen, diuresis with IV Lasix, and afterload reduction as tolerated, check echo, I&Os, daily weights.    Elevated troponin  NSTEMI or demand from hypoxia in setting of impaired renal function, continue ASA , hold BBl secondary to possible acute heart failure, hold ACEi/ARB due to renal function.  Monitor on tele, obtain serial markers.  Check echo.    Acute kidney injury superimposed on chronic kidney disease  Baseline CKD stage 3, cautious diuresis with IV Lasix, nephrology consult, appreciate recs. strict I/O's, monitor renal function and electrolytes, avoid nephrotoxic agents.     COVID-19 virus infection  Positive test 07/16/2020,  symptoms had resolved shortly thereafter.  SOB began yesterday, but no fever or cough.  Continue isolation for now.    Osteomyelitis of left foot  Stable, check vanc level, continue home IV antibiotics    Hyponatremia  Improved, mild, asymptomatic, hypervolemic.  Continue cautious diuresis with IV Lasix.    Hyperlipidemia, acquired  Continue statin.    Essential hypertension  Poorly controlled, hold BBl due to possible heart failure, continue amlodipine and monitor blood pressure, adjust as needed.     Uncontrolled type 2 diabetes mellitus with stage 3 chronic kidney disease, without long-term current use of insulin  Last HgbA1c   Lab Results   Component Value Date    HGBA1C 8.1 (H) 07/17/2020     Hold oral antihyperglycemics while inpatient  PRN sliding scale insulin  ACHS glucose monitoring   ADA diet       VTE Risk Mitigation (From admission, onward)    None        Chaim Wilson Jr., APRN, [facility]-BC  Hospitalist - Department of Hospital Medicine  [facility] [address] [facility] [place] [id number]  Office #: [phone]; Pager #: [phone]

## 2020-08-04 NOTE — ASSESSMENT & PLAN NOTE
Baseline CKD stage 3, cautious diuresis with IV Lasix, nephrology consult, appreciate recs. strict I/O's, monitor renal function and electrolytes, avoid nephrotoxic agents.

## 2020-08-04 NOTE — ASSESSMENT & PLAN NOTE
Last HgbA1c   Lab Results   Component Value Date    HGBA1C 8.1 (H) 07/17/2020     Hold oral antihyperglycemics while inpatient  PRN sliding scale insulin  ACHS glucose monitoring   ADA diet

## 2020-08-04 NOTE — PROGRESS NOTES
Pharmacokinetic Assessment Follow Up: IV Vancomycin    Vancomycin serum concentration assessment(s):    The random level was drawn correctly and can be used to guide therapy at this time. The measurement is within the desired definitive target range of 10 to 20 mcg/mL.    Vancomycin Regimen Plan:    Give 500 mg today.  Re-dose when the random level is less than 20 mcg/mL, next level to be drawn at 0400 on 8/5/2020    Drug levels (last 3 results):  Recent Labs   Lab Result Units 08/03/20  1426 08/04/20  0457   Vancomycin, Random ug/mL <1.1 18.7       Pharmacy will continue to follow and monitor vancomycin.    Please contact pharmacy at extension [phone] for questions regarding this assessment.    Thank you for the consult,   Mitch Vora Jr       Patient brief summary:  Catalino Mcfadden is a 55 y.o. male initiated on antimicrobial therapy with IV Vancomycin for treatment of skin & soft tissue infection    Drug Allergies:   Review of patient's allergies indicates:  No Known Allergies    Actual Body Weight:   136 kg    Renal Function:   Estimated Creatinine Clearance: 29.8 mL/min (A) (based on SCr of 4 mg/dL (H)).,     Dialysis Method (if applicable):  N/A    CBC (last 72 hours):  Recent Labs   Lab Result Units 08/03/20  1034 08/03/20  1426   WBC K/uL 7.18 8.46   Hemoglobin g/dL 8.4* 8.8*   Hematocrit % 27.4* 28.6*   Platelets K/uL 232 227   Gran% % 69.1 77.3*   Lymph% % 14.2* 9.0*   Mono% % 14.9 11.9   Eosinophil% % 1.0 0.5   Basophil% % 0.4 0.5   Differential Method  Automated Automated       Metabolic Panel (last 72 hours):  Recent Labs   Lab Result Units 08/03/20  1034 08/03/20  1426   Sodium mmol/L 134* 132*   Potassium mmol/L 4.9 4.7   Chloride mmol/L 105 105   CO2 mmol/L 22* 19*   Glucose mg/dL 56* 93   BUN, Bld mg/dL 52* 53*   Creatinine mg/dL 3.9* 4.0*   Albumin g/dL 2.1* 2.1*   Total Bilirubin mg/dL 0.3 0.4   Alkaline Phosphatase U/L 245* 244*   AST U/L 37 41*   ALT U/L 33 34       Vancomycin  Administrations:  vancomycin given in the last 96 hours                     vancomycin (VANCOCIN) 1,750 mg in dextrose 5 % 500 mL IVPB (mg) 1,750 mg New Bag 08/03/20 6299                    Microbiologic Results:  Microbiology Results (last 7 days)       ** No results found for the last 168 hours. **

## 2020-08-04 NOTE — SUBJECTIVE & OBJECTIVE
Past Medical History:   Diagnosis Date    CKD (chronic kidney disease), stage III 07/16/2020    CKD stage 3 due to type 1 diabetes mellitus     CKD stage 3 due to type 2 diabetes mellitus     Diabetes mellitus, type 2     Diabetic foot ulcer associated with diabetes mellitus due to underlying condition 07/16/2020    Diabetic foot ulcers     Edema 08/03/2020    generalized, including genital area    Hyperlipidemia     Hypertension     Obese        Past Surgical History:   Procedure Laterality Date    BONE BIOPSY Left 7/17/2020    Procedure: BIOPSY, BONE;  Surgeon: Verona Whitmore DPM;  Location: [facility] OR;  Service: Podiatry;  Laterality: Left;    CERVICAL DISC SURGERY  07/11/2016    DEBRIDEMENT Left 7/17/2020    Procedure: DEBRIDEMENT;  Surgeon: Verona Whitmore DPM;  Location: [facility] OR;  Service: Podiatry;  Laterality: Left;    DEBRIDEMENT OF FOOT Right     toes & plantar surface    FRACTURE SURGERY Right     medial ankle, metal plate present    left leg orthopedic surgery Left        Review of patient's allergies indicates:  No Known Allergies    No current facility-administered medications on file prior to encounter.      Current Outpatient Medications on File Prior to Encounter   Medication Sig    amLODIPine (NORVASC) 10 MG tablet Take 1 tablet (10 mg total) by mouth once daily.    carvediloL (COREG) 25 MG tablet Take 1 tablet (25 mg total) by mouth 2 (two) times daily.    gabapentin (NEURONTIN) 300 MG capsule Take 1 capsule (300 mg total) by mouth 2 (two) times daily.    hydrALAZINE (APRESOLINE) 50 MG tablet Take 1 tablet (50 mg total) by mouth every 8 (eight) hours.    insulin detemir U-100 (LEVEMIR FLEXTOUCH) 100 unit/mL (3 mL) SubQ InPn pen Inject 20 Units into the skin 2 (two) times daily.    atorvastatin (LIPITOR) 40 MG tablet Take 1 tablet (40 mg total) by mouth once daily.    blood sugar diagnostic Strp 1 strip by Misc.(Non-Drug; Combo Route) route 3 (three) times daily. Patient  "needs One Touch Test Strips (Berio).    insulin aspart U-100 (NOVOLOG) 100 unit/mL (3 mL) InPn pen Inject 10 Units into the skin 3 (three) times daily with meals. (Patient not taking: Reported on 8/3/2020)    pen needle, diabetic 32 gauge x 3/16" Ndle 1 each by Misc.(Non-Drug; Combo Route) route 5 (five) times daily.    TRULICITY 0.75 mg/0.5 mL PnIj INJECT 0.5 ML UNDER THE SKIN EVERY 7 DAYS     Family History     Problem Relation (Age of Onset)    Heart disease Father        Tobacco Use    Smoking status: Never Smoker    Smokeless tobacco: Never Used   Substance and Sexual Activity    Alcohol use: Not Currently     Alcohol/week: 0.0 standard drinks     Comment: social    Drug use: No    Sexual activity: Not on file     Review of Systems   Constitutional: Negative for chills and fever.   Eyes: Negative for photophobia and visual disturbance.   Respiratory: Negative for cough, shortness of breath and wheezing.    Cardiovascular: Positive for leg swelling. Negative for chest pain and palpitations.   Gastrointestinal: Positive for abdominal distention. Negative for abdominal pain, diarrhea, nausea and vomiting.   Genitourinary: Positive for scrotal swelling. Negative for frequency, hematuria and urgency.   Skin: Negative for pallor, rash and wound.   Neurological: Negative for light-headedness and headaches.   Psychiatric/Behavioral: Negative for confusion and decreased concentration.     Objective:     Vital Signs (Most Recent):  Temp: 99.2 °F (37.3 °C) (08/04/20 1121)  Pulse: 100 (08/04/20 1121)  Resp: 19 (08/04/20 1121)  BP: (!) 166/78 (08/04/20 1121)  SpO2: 98 % (08/04/20 1121) Vital Signs (24h Range):  Temp:  [98 °F (36.7 °C)-99.7 °F (37.6 °C)] 99.2 °F (37.3 °C)  Pulse:  [] 100  Resp:  [18-35] 19  SpO2:  [91 %-99 %] 98 %  BP: (136-189)/(70-92) 166/78     Weight: 136 kg (299 lb 13.2 oz)  Body mass index is 39.56 kg/m².    Physical Exam  Vitals signs and nursing note reviewed.   Constitutional:       " General: He is not in acute distress.     Appearance: He is well-developed.   HENT:      Head: Normocephalic and atraumatic.      Right Ear: External ear normal.      Left Ear: External ear normal.      Nose: Nose normal.   Eyes:      Conjunctiva/sclera: Conjunctivae normal.      Pupils: Pupils are equal, round, and reactive to light.   Neck:      Musculoskeletal: Normal range of motion and neck supple.   Cardiovascular:      Rate and Rhythm: Normal rate and regular rhythm.   Pulmonary:      Effort: Tachypnea and accessory muscle usage present. No respiratory distress.      Breath sounds: Decreased breath sounds present. No wheezing or rales.   Abdominal:      General: Abdomen is protuberant. Bowel sounds are normal. There is no distension.      Palpations: Abdomen is soft.      Tenderness: There is no abdominal tenderness.      Comments: No palpable hepatomegaly or splenomegaly   Musculoskeletal: Normal range of motion.         General: No tenderness.      Right lower leg: Edema present.      Left lower leg: Edema present.   Skin:     General: Skin is warm and dry.   Neurological:      Mental Status: He is alert and oriented to person, place, and time.   Psychiatric:         Thought Content: Thought content normal.           CRANIAL NERVES     CN III, IV, VI   Pupils are equal, round, and reactive to light.       Significant Labs: All pertinent labs within the past 24 hours have been reviewed.    Significant Imaging: I have reviewed all pertinent imaging results/findings within the past 24 hours.

## 2020-08-04 NOTE — ASSESSMENT & PLAN NOTE
Likely secondary to acute pulmonary edema, continue supplemental oxygen, diuresis with IV Lasix, and afterload reduction as tolerated, echo show preserved EF,, I&Os, daily weights.

## 2020-08-04 NOTE — PLAN OF CARE
Pt AAOx4, VSS on 2L NC, Cardiac monitoring, cont. pulse ox monitoring, Diet advanced from NPO to Renal diet, scheduled meds administered, voids per urinal, remains free of fall.    Problem: Fall Injury Risk  Goal: Absence of Fall and Fall-Related Injury  Outcome: Ongoing, Progressing  Intervention: Identify and Manage Contributors to Fall Injury Risk  Flowsheets (Taken 8/4/2020 1455)  Self-Care Promotion:   independence encouraged   BADL personal objects within reach   BADL personal routines maintained  Medication Review/Management: medications reviewed  Intervention: Promote Injury-Free Environment  Flowsheets (Taken 8/4/2020 1455)  Safety Promotion/Fall Prevention:   assistive device/personal item within reach   side rails raised x 2   nonskid shoes/socks when out of bed   medications reviewed   Fall Risk reviewed with patient/family   commode/urinal/bedpan at bedside  Environmental Safety Modification:   assistive device/personal items within reach   clutter free environment maintained     Problem: Diabetes Comorbidity  Goal: Blood Glucose Level Within Desired Range  Outcome: Ongoing, Progressing  Intervention: Maintain Glycemic Control  Flowsheets (Taken 8/4/2020 1455)  Glycemic Management: blood glucose monitoring     Problem: Infection  Goal: Infection Symptom Resolution  Outcome: Ongoing, Progressing  Intervention: Prevent or Manage Infection  Flowsheets (Taken 8/4/2020 1455)  Fever Reduction/Comfort Measures: medication administered  Infection Management: aseptic technique maintained  Isolation Precautions:   airborne precautions maintained   contact precautions maintained   droplet precautions maintained

## 2020-08-04 NOTE — PROGRESS NOTES
[facility] Hospital Medicine  Progress Note    Patient Name: Catalino Mcfadden  MRN: [id number]  Patient Class: IP- Inpatient   Admission Date: 8/3/2020  Length of Stay: 1 days  Attending Physician: Laura Crowder MD  Primary Care Provider: Azikiwe K Lombard, MD        Subjective:     Principal Problem:Acute respiratory failure with hypoxia        HPI:  55 y.o. male with hypertension, hyperlipidemia, dm 2, CKD stage 3, osteomyelitis of left foot on home IV antibiotics via PICC line, and recent diagnosis of COVID-19 infection presents with a complaint of shortness of breath.  Acute onset yesterday, associated with extensive edema to the bilateral lower extremities up to the level of the abdomen and arms with abdominal distension.  SOB exacerbated with exertion.  Denies fever, chills, cough, chest pain, palpitations, dizziness, syncope, nausea, vomiting, diarrhea, abdominal pain, bloody black stool, or dysuria.  In the ED, chest x-ray reveals likely pulmonary edema; however, changes secondary to COVID-19 infection not excluded.  Labs show acute kidney injury creatinine 4.0, increased above baseline.  He was found to be mildly hypoxic on room air requiring supplemental oxygen.  Troponin also elevated at 0.118.  ASA, full-dose Lovenox, and IV Lasix initiated.  Admitted to hospital medicine for further evaluation and treatment.    Overview/Hospital Course:  55 y.o. male with hypertension, hyperlipidemia, dm 2, CKD stage 3, osteomyelitis of left foot on home IV antibiotics via PICC line, and recent diagnosis of COVID-19 infection presents with a complaint of shortness of breath.  Acute onset day before admission,, associated with extensive edema to the bilateral lower extremities up to the level of the abdomen and arms with abdominal distension.  SOB exacerbated with exertion.  Denies fever, chills, cough, chest pain, palpitations, dizziness, syncope, nausea, vomiting, diarrhea, abdominal pain, bloody  black stool, or dysuria.  In the ED, chest x-ray reveals likely pulmonary edema; however, changes secondary to COVID-19 infection not excluded.  Labs show acute kidney injury creatinine 4.0, increased above baseline.  He was found to be mildly hypoxic on room air requiring supplemental oxygen.  Troponin also elevated at 0.118.  ASA, full-dose Lovenox, and IV Lasix initiated.  Admitted to hospital medicine for further evaluation and treatment.echo show preserved EF,will hold lasix at this time,he is stabe on 2 liter NC O 2,nephroplogy is consulted for worsening CKD.      Past Medical History:   Diagnosis Date    CKD (chronic kidney disease), stage III 07/16/2020    CKD stage 3 due to type 1 diabetes mellitus     CKD stage 3 due to type 2 diabetes mellitus     Diabetes mellitus, type 2     Diabetic foot ulcer associated with diabetes mellitus due to underlying condition 07/16/2020    Diabetic foot ulcers     Edema 08/03/2020    generalized, including genital area    Hyperlipidemia     Hypertension     Obese        Past Surgical History:   Procedure Laterality Date    BONE BIOPSY Left 7/17/2020    Procedure: BIOPSY, BONE;  Surgeon: Verona Whitmore DPM;  Location: [facility] OR;  Service: Podiatry;  Laterality: Left;    CERVICAL DISC SURGERY  07/11/2016    DEBRIDEMENT Left 7/17/2020    Procedure: DEBRIDEMENT;  Surgeon: Verona Whitmore DPM;  Location: [facility] OR;  Service: Podiatry;  Laterality: Left;    DEBRIDEMENT OF FOOT Right     toes & plantar surface    FRACTURE SURGERY Right     medial ankle, metal plate present    left leg orthopedic surgery Left        Review of patient's allergies indicates:  No Known Allergies    No current facility-administered medications on file prior to encounter.      Current Outpatient Medications on File Prior to Encounter   Medication Sig    amLODIPine (NORVASC) 10 MG tablet Take 1 tablet (10 mg total) by mouth once daily.    carvediloL (COREG) 25 MG tablet Take 1 tablet (25  "mg total) by mouth 2 (two) times daily.    gabapentin (NEURONTIN) 300 MG capsule Take 1 capsule (300 mg total) by mouth 2 (two) times daily.    hydrALAZINE (APRESOLINE) 50 MG tablet Take 1 tablet (50 mg total) by mouth every 8 (eight) hours.    insulin detemir U-100 (LEVEMIR FLEXTOUCH) 100 unit/mL (3 mL) SubQ InPn pen Inject 20 Units into the skin 2 (two) times daily.    atorvastatin (LIPITOR) 40 MG tablet Take 1 tablet (40 mg total) by mouth once daily.    blood sugar diagnostic Strp 1 strip by Misc.(Non-Drug; Combo Route) route 3 (three) times daily. Patient needs One Touch Test Strips (Berio).    insulin aspart U-100 (NOVOLOG) 100 unit/mL (3 mL) InPn pen Inject 10 Units into the skin 3 (three) times daily with meals. (Patient not taking: Reported on 8/3/2020)    pen needle, diabetic 32 gauge x 3/16" Ndle 1 each by Misc.(Non-Drug; Combo Route) route 5 (five) times daily.    TRULICITY 0.75 mg/0.5 mL PnIj INJECT 0.5 ML UNDER THE SKIN EVERY 7 DAYS     Family History     Problem Relation (Age of Onset)    Heart disease Father        Tobacco Use    Smoking status: Never Smoker    Smokeless tobacco: Never Used   Substance and Sexual Activity    Alcohol use: Not Currently     Alcohol/week: 0.0 standard drinks     Comment: social    Drug use: No    Sexual activity: Not on file     Review of Systems   Constitutional: Negative for chills and fever.   Eyes: Negative for photophobia and visual disturbance.   Respiratory: Negative for cough, shortness of breath and wheezing.    Cardiovascular: Positive for leg swelling. Negative for chest pain and palpitations.   Gastrointestinal: Positive for abdominal distention. Negative for abdominal pain, diarrhea, nausea and vomiting.   Genitourinary: Positive for scrotal swelling. Negative for frequency, hematuria and urgency.   Skin: Negative for pallor, rash and wound.   Neurological: Negative for light-headedness and headaches.   Psychiatric/Behavioral: Negative for " confusion and decreased concentration.     Objective:     Vital Signs (Most Recent):  Temp: 99.2 °F (37.3 °C) (08/04/20 1121)  Pulse: 100 (08/04/20 1121)  Resp: 19 (08/04/20 1121)  BP: (!) 166/78 (08/04/20 1121)  SpO2: 98 % (08/04/20 1121) Vital Signs (24h Range):  Temp:  [98 °F (36.7 °C)-99.7 °F (37.6 °C)] 99.2 °F (37.3 °C)  Pulse:  [] 100  Resp:  [18-35] 19  SpO2:  [91 %-99 %] 98 %  BP: (136-189)/(70-92) 166/78     Weight: 136 kg (299 lb 13.2 oz)  Body mass index is 39.56 kg/m².    Physical Exam  Vitals signs and nursing note reviewed.   Constitutional:       General: He is not in acute distress.     Appearance: He is well-developed.   HENT:      Head: Normocephalic and atraumatic.      Right Ear: External ear normal.      Left Ear: External ear normal.      Nose: Nose normal.   Eyes:      Conjunctiva/sclera: Conjunctivae normal.      Pupils: Pupils are equal, round, and reactive to light.   Neck:      Musculoskeletal: Normal range of motion and neck supple.   Cardiovascular:      Rate and Rhythm: Normal rate and regular rhythm.   Pulmonary:      Effort: Tachypnea and accessory muscle usage present. No respiratory distress.      Breath sounds: Decreased breath sounds present. No wheezing or rales.   Abdominal:      General: Abdomen is protuberant. Bowel sounds are normal. There is no distension.      Palpations: Abdomen is soft.      Tenderness: There is no abdominal tenderness.      Comments: No palpable hepatomegaly or splenomegaly   Musculoskeletal: Normal range of motion.         General: No tenderness.      Right lower leg: Edema present.      Left lower leg: Edema present.   Skin:     General: Skin is warm and dry.   Neurological:      Mental Status: He is alert and oriented to person, place, and time.   Psychiatric:         Thought Content: Thought content normal.           CRANIAL NERVES     CN III, IV, VI   Pupils are equal, round, and reactive to light.       Significant Labs: All pertinent labs  within the past 24 hours have been reviewed.    Significant Imaging: I have reviewed all pertinent imaging results/findings within the past 24 hours.      Assessment/Plan:      * Acute respiratory failure with hypoxia  Likely secondary to acute pulmonary edema, continue supplemental oxygen, diuresis with IV Lasix, and afterload reduction as tolerated, echo show preserved EF,, I&Os, daily weights.    Elevated troponin  NSTEMI or demand from hypoxia in setting of impaired renal function, continue ASA , hold BBl secondary to possible acute heart failure, hold ACEi/ARB due to renal function.  Monitor on tele, obtain serial markers.echo show preserved EF.      Osteomyelitis of left foot  Stable, check vanc level, continue home IV antibiotics    Acute kidney injury superimposed on chronic kidney disease  Baseline CKD stage 3, cautious diuresis with IV Lasix, nephrology consult, appreciate recs. strict I/O's, monitor renal function and electrolytes, avoid nephrotoxic agents.     Hyponatremia  Improved, mild, asymptomatic, hypervolemic.  Continue cautious diuresis with IV Lasix.    COVID-19 virus infection  Positive test 07/16/2020, symptoms had resolved shortly thereafter. , but no fever or cough.  Continue isolation for now.    Hyperlipidemia, acquired  Continue statin.    Essential hypertension  Poorly controlled, hold BBl due to possible heart failure, continue amlodipine and monitor blood pressure, adjust as needed.     Uncontrolled type 2 diabetes mellitus with stage 3 chronic kidney disease, without long-term current use of insulin  Last HgbA1c   Lab Results   Component Value Date    HGBA1C 8.1 (H) 07/17/2020     Hold oral antihyperglycemics while inpatient  PRN sliding scale insulin  ACHS glucose monitoring   ADA diet       VTE Risk Mitigation (From admission, onward)         Ordered     heparin (porcine) injection 5,000 Units  Every 8 hours      08/04/20 [id number]                      Laura Crowder  MD  Department of Hospital Medicine   [facility]

## 2020-08-04 NOTE — ASSESSMENT & PLAN NOTE
Poorly controlled, hold BBl due to possible heart failure, continue amlodipine and monitor blood pressure, adjust as needed.

## 2020-08-04 NOTE — PLAN OF CARE
[place] spoke with patient's spouse Gladys @ [phone] concerning discharge planning assessment. Gladys stated that the patient was independent prior to coming into the hospital. Gladys also stated that she help's the patient to manage his care at home. Patient does not have any DME. Patient was previously receiving IV Abx's from [facility]. He also recently was receiving home health from [place] prior to coming to the hospital. Patient denies taking coumadin and denies receiving dialysis. Patient's wife that he has problems paying the copay on his [email or web address] insurance.        08/04/20 1021   Discharge Assessment   Assessment Type Discharge Planning Assessment   Confirmed/corrected address and phone number on facesheet? Yes   Assessment information obtained from? Other  (Gladys Mcfadden(spouse))   Expected Length of Stay (days) 5   Communicated expected length of stay with patient/caregiver yes   Prior to hospitilization cognitive status: Alert/Oriented   Prior to hospitalization functional status: Independent   Current cognitive status: Alert/Oriented   Current Functional Status: Independent   Lives With spouse   Able to Return to Prior Arrangements yes   Is patient able to care for self after discharge? Yes   Readmission Within the Last 30 Days no previous admission in last 30 days   Patient currently being followed by outpatient case management? No   Patient currently receives any other outside agency services? Yes   How many hours a day does the patient receive services? 1   Name and contact number of agency or person providing outside services [place]   Is it the patient/care giver preference to resume care with the current outside agency? Yes   Equipment Currently Used at Home none   Do you have any problems affording any of your prescribed medications? Yes   If yes, what medications? patient wife stated he has a problem with all of his copays   Is the patient taking medications as prescribed? yes   Does the  patient have transportation home? Yes   Transportation Anticipated family or friend will provide   Does the patient receive services at the [facility]? No   Discharge Plan A Home Health   DME Needed Upon Discharge  none   Patient/Family in Agreement with Plan yes   Does the patient have transportation to healthcare appointments? Yes

## 2020-08-04 NOTE — ASSESSMENT & PLAN NOTE
NSTEMI or demand from hypoxia in setting of impaired renal function, continue ASA , hold BBl secondary to possible acute heart failure, hold ACEi/ARB due to renal function.  Monitor on tele, obtain serial markers.  Check echo.

## 2020-08-04 NOTE — ASSESSMENT & PLAN NOTE
NSTEMI or demand from hypoxia in setting of impaired renal function, continue ASA , hold BBl secondary to possible acute heart failure, hold ACEi/ARB due to renal function.  Monitor on tele, obtain serial markers.echo show preserved EF.

## 2020-08-04 NOTE — ED NOTES
Patient phoned his wife to tell her he felt like his blood sugar was dropping.  POC glucose completed.  Provider notified with new order received.

## 2020-08-04 NOTE — CONSULTS
[facility]  Podiatry  Consult Note    Patient Name: Catalino Mcfadden  MRN: [id number]  Admission Date: 8/3/2020  Hospital Length of Stay: 1 days  Attending Physician: Laura Crowder MD  Primary Care Provider: Azikiwe K Lombard, MD     Inpatient consult to Podiatry  Consult performed by: Aviva Martinez DPM  Consult ordered by: Laura Crowder MD  Reason for consult: foot osteomyelitis  Assessment/Recommendations: Foot wound is stable. No surgical intervention.  Treating OM with wound care and IV abx outpatient. Wound VAC pending outpatient.  Nursing dressing changes ordered while inpatient.         Subjective:     History of Present Illness:   Catalino Mcfadden is a 55 y.o. male with  has a past medical history of CKD (chronic kidney disease), stage III, CKD stage 3 due to type 1 diabetes mellitus, CKD stage 3 due to type 2 diabetes mellitus, Diabetes mellitus, type 2, Diabetic foot ulcer associated with diabetes mellitus due to underlying condition, Diabetic foot ulcers, Edema, Hyperlipidemia, Hypertension, and Obese.  Consult to Podiatry for evaluation and treatment of left foot ulcer.  Patient is known to our service.  On last admit was treated for osteomyelitis to the 5th metatarsal.  During admit patient was given the choice between ray amputation or IV antibiotics with wound care.  Patient elected for antibiotics and wound care. Patient is status post surgical debridement with bone biopsy on 07/17/2020 by Dr. Verona Whitmore. He was last seen by me in [facility] on Friday and orders were placed to initiate a wound VAC in the outpatient setting.  Until that time patient was having home health dressing changes twice weekly.   Denies pedal pain. CAM boot intact left foot.  No new pedal complaints.  Patient admitted for non podiatric complaint.      Chief Complaint   Patient presents with    Shortness of Breath     started  yesterday    Bloated    Leg Swelling     edema getting worse  since starting abx    Wheezing     started yesterday         Scheduled Meds:   aspirin  325 mg Oral Daily    heparin (porcine)  5,000 Units Subcutaneous Q8H    hydrALAZINE  50 mg Oral Q8H    piperacillin-tazobactam (ZOSYN) IVPB  4.5 g Intravenous Q12H     Continuous Infusions:  PRN Meds:acetaminophen, cloNIDine, dextrose 50%, dextrose 50%, glucagon (human recombinant), glucose, glucose, insulin aspart U-100    Review of patient's allergies indicates:  No Known Allergies     Past Medical History:   Diagnosis Date    CKD (chronic kidney disease), stage III 07/16/2020    CKD stage 3 due to type 1 diabetes mellitus     CKD stage 3 due to type 2 diabetes mellitus     Diabetes mellitus, type 2     Diabetic foot ulcer associated with diabetes mellitus due to underlying condition 07/16/2020    Diabetic foot ulcers     Edema 08/03/2020    generalized, including genital area    Hyperlipidemia     Hypertension     Obese      Past Surgical History:   Procedure Laterality Date    BONE BIOPSY Left 7/17/2020    Procedure: BIOPSY, BONE;  Surgeon: Verona Whitmore DPM;  Location: [facility] OR;  Service: Podiatry;  Laterality: Left;    CERVICAL DISC SURGERY  07/11/2016    DEBRIDEMENT Left 7/17/2020    Procedure: DEBRIDEMENT;  Surgeon: Verona Whitmore DPM;  Location: [facility] OR;  Service: Podiatry;  Laterality: Left;    DEBRIDEMENT OF FOOT Right     toes & plantar surface    FRACTURE SURGERY Right     medial ankle, metal plate present    left leg orthopedic surgery Left        Family History     Problem Relation (Age of Onset)    Heart disease Father        Tobacco Use    Smoking status: Never Smoker    Smokeless tobacco: Never Used   Substance and Sexual Activity    Alcohol use: Not Currently     Alcohol/week: 0.0 standard drinks     Comment: social    Drug use: No    Sexual activity: Not on file     Review of Systems   Constitutional: Positive for activity change and fatigue. Negative for appetite change, chills  and fever.   Respiratory: Positive for shortness of breath and wheezing. Negative for cough.    Cardiovascular: Positive for leg swelling. Negative for chest pain.   Gastrointestinal: Positive for abdominal distention. Negative for diarrhea, nausea and vomiting.   Musculoskeletal: Positive for arthralgias, joint swelling and myalgias.   Skin: Positive for wound.   Neurological: Positive for numbness and headaches. Negative for weakness.        + paresthesia      Objective:     Vital Signs (Most Recent):  Temp: 98.2 °F (36.8 °C) (08/04/20 1404)  Pulse: 98 (08/04/20 1404)  Resp: 19 (08/04/20 1121)  BP: (!) 170/85 (08/04/20 1404)  SpO2: 98 % (08/04/20 1121) Vital Signs (24h Range):  Temp:  [98.1 °F (36.7 °C)-99.7 °F (37.6 °C)] 98.2 °F (36.8 °C)  Pulse:  [] 98  Resp:  [18-35] 19  SpO2:  [92 %-99 %] 98 %  BP: (136-189)/(70-92) 170/85     Weight: 136 kg (299 lb 13.2 oz)  Body mass index is 39.56 kg/m².    Physical Exam  Vitals signs and nursing note reviewed.   Constitutional:       General: He is not in acute distress.     Appearance: He is not toxic-appearing or diaphoretic.      Comments: Pt. is well-developed, well-nourished, appears stated age, in no acute distress, alert and oriented x 3. No evidence of depression, anxiety, or agitation. Calm, cooperative, and communicative. Appropriate interactions and affect.   Cardiovascular:      Pulses:           Dorsalis pedis pulses are 2+ on the right side and 2+ on the left side.        Posterior tibial pulses are 1+ on the right side and 1+ on the left side.      Comments: There is decreased digital hair. Skin is atrophic, slightly hyperpigmented. Edema to BLE  Pulmonary:      Effort: No respiratory distress.   Musculoskeletal:      Right ankle: He exhibits normal range of motion. No tenderness. No lateral malleolus, no medial malleolus, no AITFL, no CF ligament and no posterior TFL tenderness found. Achilles tendon exhibits no pain, no defect and normal Hilliard's  test results.      Left ankle: He exhibits normal range of motion . No tenderness. No lateral malleolus, no medial malleolus, no AITFL, no CF ligament and no posterior TFL tenderness found. Achilles tendon exhibits no pain, no defect and normal Hilliard's test results.      Right foot: No tenderness or bony tenderness.      Left foot: No tenderness or bony tenderness.      Comments: Decreased stride, station of gait.  apropulsive toe off.  Increased angle and base of gait.    There is equinus deformity bilateral with decreased dorsiflexion at the ankle joint bilateral. No tenderness with compression of heel. Negative tinels sign. Gait analysis reveals excessive pronation through midstance and propulsion with early heel off.     Patient has hammertoes of digits 2-5 bilateral partially reducible     Decreased first MPJ range of motion both weightbearing and nonweightbearing, no crepitus observed the first MP joint, + dorsal flag sign.     Visible and palpable bunion without pain at dorsomedial 1st metatarsal head right and left.  Hallux abducted right and left partially reducible, tracks laterally without being track bound.  No ecchymosis, erythema, edema, or cardinal signs infection or signs of trauma same foot.    Fat pad atrophy to heels and met heads bilateral    Plantarflexed 1st ray RLE   Lymphadenopathy:      Comments: No lymphatic streaking    Negative lymphadenopathy bilateral popliteal fossa and tarsal tunnel.     Skin:     General: Skin is warm and dry.      Coloration: Skin is not pale.      Findings: Erythema and lesion (wound description) present. No abrasion, ecchymosis, laceration or rash.      Nails: There is no clubbing.        Comments: Ulcer location: Left  lateral 5th metatarsal base     Present Prior to Hospital Arrival?: Yes   Side: Left   Location: Foot   Orientation: plantar lateral     Wound Image 08/04/2020           Incision WDL ex   Dressing Appearance Moist drainage   Drainage Amount  Moderate   Drainage Characteristics/Odor Serosanguineous   Appearance Pink;Red;Yellow;Slough;Sutures intact   Black (%), Wound Tissue Color 0 %   Red (%), Wound Tissue Color 70 %   Yellow (%), Wound Tissue Color 30 %   Periwound Area Macerated   Wound Edges Open   Wound Length (cm) 4.5 cm   Wound Width (cm) 3.5 cm   Wound Depth (cm) 1.1 cm   Wound Volume (cm^3) 17.32 cm^3   Wound Surface Area (cm^2) 15.75 cm^2       Toenails 1-5 bilaterally discolored/yellowed, dystrophic, brittle with subungual debris.    Neurological:      Sensory: Sensory deficit present.      Comments:  [place] 5.07 monofilamant testing is diminished Kp feet. Decreased/absent vibratory sensation bilateral feet to 128Hz tuning fork.     Paresthesias, and hyperesthesia bilateral feet with no clearly identified trigger or source.     Psychiatric:         Attention and Perception: He is attentive.         Mood and Affect: Mood is not anxious. Affect is not inappropriate.         Speech: He is communicative. Speech is not slurred.         Behavior: Behavior is not combative.          Laboratory:  A1C:   Recent Labs   Lab 07/17/20  0455   HGBA1C 8.1*     CBC:   Recent Labs   Lab 08/03/20  1426   WBC 8.46   RBC 3.34*   HGB 8.8*   HCT 28.6*      MCV 86   MCH 26.3*   MCHC 30.8*     CMP:   Recent Labs   Lab 08/03/20  1426   GLU 93   CALCIUM 8.4*   ALBUMIN 2.1*   PROT 6.9   *   K 4.7   CO2 19*      BUN 53*   CREATININE 4.0*   ALKPHOS 244*   ALT 34   AST 41*   BILITOT 0.4     CRP:   Recent Labs   Lab 08/03/20  1034   CRP 38.0*     ESR:   Recent Labs   Lab 08/03/20  1034   SEDRATE 134*     Microbiology Results (last 7 days)     ** No results found for the last 168 hours. **          Diagnostic Results:   Imaging Results          US Lower Extremity Veins Right (Final result)  Result time 08/03/20 18:22:16    Final result by Porsha Mcgowan MD (08/03/20 18:22:16)                 Impression:      No evidence of left lower  extremity deep venous thrombosis.      Electronically signed by: Porsha Mcgowan MD  Date:    08/03/2020  Time:    18:22             Narrative:    EXAMINATION:  US LOWER EXTREMITY VEINS RIGHT    CLINICAL HISTORY:  Edema, unspecified    TECHNIQUE:  Duplex and color flow Doppler evaluation of the left lower extremity veins was performed.    COMPARISON:  None    FINDINGS:  No evidence of clot involving the bilateral common femoral veins or left greater saphenous, femoral, popliteal, peroneal, anterior and posterior tibial veins.  All venous structures demonstrate normal respiratory phasicity and augment adequately.  No evidence of soft tissue mass or Baker's cyst.                               US Upper Extremity Veins Right (Final result)  Result time 08/03/20 18:24:28    Final result by Porsha Mcgowan MD (08/03/20 18:24:28)                 Impression:      No evidence of right upper extremity deep venous thrombosis.      Electronically signed by: Porsha Mcgowan MD  Date:    08/03/2020  Time:    18:24             Narrative:    EXAMINATION:  US UPPER EXTREMITY VEINS RIGHT    CLINICAL HISTORY:  RUE edema with PICC line;    TECHNIQUE:  Duplex and color flow Doppler evaluation and dynamic compression was performed of the upper extremity veins.    COMPARISON:  None    FINDINGS:  PICC line seen on the right.  No evidence of clot involving the bilateral jugular and subclavian veins or right axillary, brachial, basilic and cephalic veins.  All venous structures demonstrate normal respiratory phasicity and augment adequately.  No soft tissue masses.                               X-Ray Chest AP Portable (Final result)  Result time 08/03/20 16:23:08    Final result by Rogelio Ellsworth MD (08/03/20 16:23:08)                 Impression:      1. Central pulmonary vascular congestion associated with interstitial edema, likely representing CHF or pulmonary edema.  Superimposed infectious process not excluded.      Electronically  signed by: Rogelio Ellsworth MD  Date:    08/03/2020  Time:    16:23             Narrative:    EXAMINATION:  XR CHEST AP PORTABLE    CLINICAL HISTORY:  Shortness of Breath;    TECHNIQUE:  Single frontal view of the chest was performed.    COMPARISON:  Chest 07/20/2020    FINDINGS:  There is enlargement of the cardiac silhouette.  There is presence of a right PICC line in unchanged position.    Since the previous examination there is significant central pulmonary vascular congestion with prominence of the interstitial markings, suspicious for CHF or pulmonary edema.  Superimposed infectious process not excluded.  There is no pleural effusions.    Postoperative changes from cervical disc prosthesis in the lower cervical spine noted.  There are cardiac monitoring leads over the chest.                                  Assessment/Plan:     Active Diagnoses:    Diagnosis Date Noted POA    PRINCIPAL PROBLEM:  Acute respiratory failure with hypoxia [J96.01] 08/03/2020 Yes    Elevated troponin [R79.89] 08/03/2020 Yes    Osteomyelitis of left foot [M86.9]  Yes    Acute kidney injury superimposed on chronic kidney disease [N17.9, N18.9] 07/16/2020 Yes    Hyponatremia [E87.1] 07/16/2020 Yes    COVID-19 virus infection [U07.1] 07/16/2020 Yes    Hyperlipidemia, acquired [E78.5] 12/24/2014 Yes     Chronic    Uncontrolled type 2 diabetes mellitus with stage 3 chronic kidney disease, without long-term current use of insulin [E11.22, E11.65, N18.3] 08/26/2014 Yes     Chronic    Essential hypertension [I10] 08/26/2014 Yes     Chronic      Problems Resolved During this Admission:         Greater than 50% of this visit spent on counseling and coordination of care.    Education about the prevention of limb loss.    Discussed wound healing cycle, skin integrity, ways to care for skin.Counseled patient on the effects of blood glucose on healing. He verbalizes understanding that it can increase the chances of delayed healing and this  prolonged exposure leads to infection or progression of infection which subsequently can result in loss of limb.    The wound is cleansed of foreign material as much as possible and the base inspected for bone or abscess.  Base is fibrogranular with bone also noted.  Bone biopsy acquired on previous admit.  Patient already evaluated and treated by Infectious Disease with plans for Zosyn until September.  PICC line in place.  No surgical intervention indicated at this time.    Wound is stable.  There were plans for application of wound VAC in the outpatient setting.  While inpatient patient will have nursing dressing changes with Iodosorb and dry sterile dressings daily.  If admit extends beyond 5 days will place wound VAC on inpatient.  On discharge he will continue to follow with me in the [facility].      Thank you for your consult. I will follow-up with patient. Please contact us if you have any additional questions.    Aviva Martinez DPM  Podiatry  [facility]

## 2020-08-04 NOTE — ASSESSMENT & PLAN NOTE
Positive test 07/16/2020, symptoms had resolved shortly thereafter.  SOB began yesterday, but no fever or cough.  Continue isolation for now.

## 2020-08-04 NOTE — ASSESSMENT & PLAN NOTE
Likely secondary to acute pulmonary edema, continue supplemental oxygen, diuresis with IV Lasix, and afterload reduction as tolerated, check echo, I&Os, daily weights.

## 2020-08-04 NOTE — CONSULTS
Renal Consult    Date of Admission:  8/3/2020  1:51 PM        Chief Complaint:   Chief Complaint   Patient presents with    Shortness of Breath     started  yesterday    Bloated    Leg Swelling     edema getting worse since starting abx    Wheezing     started yesterday       HPI: 55 y.o. male with a PMHx. Significant for: hypertension, hyperlipidemia, DM-2, CKD stage 3b (baseline creatinine from 2.5 to 3.1) osteomyelitis of left 5th. Toe on home IV antibiotics via PICC line (pte. Discharged home on 7/24/20) and recent diagnosis of COVID-19 infection (early July) who presented to the ED today  with a chief complaint of acute onset shortness of breath associated with extensive edema to the lower extremities up to the level of the abdomen and with abdominal distension.    Pte. denied fever, chills, cough, chest pain, palpitations, dizziness, syncope, nausea, vomiting, diarrhea, abdominal pain, bloody black stool, or dysuria.    In the ED, chest x-ray revealed infiltrates consistent with pulmonary edema  And his Labs. Revealed a creatinine of 4, BUN 53, Na+ 132, random Vanco level of < 1.1 and Albumin 2.1    PMH:  Past Medical History:   Diagnosis Date    CKD (chronic kidney disease), stage III 07/16/2020    CKD stage 3 due to type 1 diabetes mellitus     CKD stage 3 due to type 2 diabetes mellitus     Diabetes mellitus, type 2     Diabetic foot ulcer associated with diabetes mellitus due to underlying condition 07/16/2020    Diabetic foot ulcers     Edema 08/03/2020    generalized, including genital area    Hyperlipidemia     Hypertension     Obese        PSH:  Past Surgical History:   Procedure Laterality Date    BONE BIOPSY Left 7/17/2020    Procedure: BIOPSY, BONE;  Surgeon: Verona Whitmore DPM;  Location: [facility];  Service: Podiatry;  Laterality: Left;    CERVICAL DISC SURGERY  07/11/2016    DEBRIDEMENT Left 7/17/2020    Procedure: DEBRIDEMENT;  Surgeon:  "Verona Whitmore DPM;  Location: [facility] OR;  Service: Podiatry;  Laterality: Left;    DEBRIDEMENT OF FOOT Right     toes & plantar surface    FRACTURE SURGERY Right     medial ankle, metal plate present    left leg orthopedic surgery Left        Allergies:  Review of patient's allergies indicates:  No Known Allergies    No current facility-administered medications on file prior to encounter.      Current Outpatient Medications on File Prior to Encounter   Medication Sig Dispense Refill    amLODIPine (NORVASC) 10 MG tablet Take 1 tablet (10 mg total) by mouth once daily. 90 tablet 3    carvediloL (COREG) 25 MG tablet Take 1 tablet (25 mg total) by mouth 2 (two) times daily. 60 tablet 11    gabapentin (NEURONTIN) 300 MG capsule Take 1 capsule (300 mg total) by mouth 2 (two) times daily. 60 capsule 11    hydrALAZINE (APRESOLINE) 50 MG tablet Take 1 tablet (50 mg total) by mouth every 8 (eight) hours. 90 tablet 11    insulin detemir U-100 (LEVEMIR FLEXTOUCH) 100 unit/mL (3 mL) SubQ InPn pen Inject 20 Units into the skin 2 (two) times daily. 36 mL 3    atorvastatin (LIPITOR) 40 MG tablet Take 1 tablet (40 mg total) by mouth once daily. 90 tablet 3    blood sugar diagnostic Strp 1 strip by Misc.(Non-Drug; Combo Route) route 3 (three) times daily. Patient needs One Touch Test Strips (Berio). 100 strip 2    insulin aspart U-100 (NOVOLOG) 100 unit/mL (3 mL) InPn pen Inject 10 Units into the skin 3 (three) times daily with meals. (Patient not taking: Reported on 8/3/2020) 27 mL 3    pen needle, diabetic 32 gauge x 3/16" Ndle 1 each by Misc.(Non-Drug; Combo Route) route 5 (five) times daily. 100 each 10    TRULICITY 0.75 mg/0.5 mL PnIj INJECT 0.5 ML UNDER THE SKIN EVERY 7 DAYS 2 mL 5       Medications:  Current Facility-Administered Medications   Medication Dose Route Frequency Provider Last Rate Last Dose    aspirin EC tablet 325 mg  325 mg Oral Daily Francisco Michelle MD   325 mg at 08/04/20 0816    dextrose " 50% injection 12.5 g  12.5 g Intravenous PRN Laura Crowder MD   12.5 g at 08/04/20 0825    dextrose 50% injection 25 g  25 g Intravenous PRN Laura Crowder MD        enoxaparin injection 110 mg  1 mg/kg Subcutaneous Q24H Francisco Michelle MD        furosemide injection 40 mg  40 mg Intravenous BID Francisco Michelle MD   40 mg at 08/04/20 0816    glucagon (human recombinant) injection 1 mg  1 mg Intramuscular PRN Laura Crowder MD        glucose chewable tablet 16 g  16 g Oral PRROSANNE Crowder MD        glucose chewable tablet 24 g  24 g Oral PRROSANNE Crowder MD        hydrALAZINE tablet 50 mg  50 mg Oral Q8H Francisco Michelle MD   50 mg at 08/04/20 0600    insulin aspart U-100 pen 0-5 Units  0-5 Units Subcutaneous QID (AC + HS) PAOLO Crowder MD        piperacillin-tazobactam 4.5 g in sodium chloride 0.9% 100 mL IVPB (ready to mix system)  4.5 g Intravenous Q12H Francisco Michelle MD 25 mL/hr at 08/04/20 0118 4.5 g at 08/04/20 0118    vancomycin - pharmacy to dose   Intravenous pharmacy to manage frequency Francisco Michelle MD           FamHx:  Family History   Problem Relation Age of Onset    Heart disease Father        SocHx:  Social History     Socioeconomic History    Marital status:      Spouse name: Not on file    Number of children: Not on file    Years of education: Not on file    Highest education level: Not on file   Occupational History    Not on file   Social Needs    Financial resource strain: Not on file    Food insecurity     Worry: Not on file     Inability: Not on file    Transportation needs     Medical: Not on file     Non-medical: Not on file   Tobacco Use    Smoking status: Never Smoker    Smokeless tobacco: Never Used   Substance and Sexual Activity    Alcohol use: Not Currently     Alcohol/week: 0.0 standard drinks     Comment: social    Drug use: No    Sexual activity: Not on file   Lifestyle     Physical activity     Days per week: Not on file     Minutes per session: Not on file    Stress: Not on file   Relationships    Social connections     Talks on phone: Not on file     Gets together: Not on file     Attends Buddhist service: Not on file     Active member of club or organization: Not on file     Attends meetings of clubs or organizations: Not on file     Relationship status: Not on file   Other Topics Concern    Not on file   Social History Narrative    Not on file           Review of Systems: see H&P       Physical Exam:  Deferred / covid-19 positive on resp. isolation  Vitals:   Vitals:    08/04/20 0814   BP: (!) 174/84   Pulse: 98   Resp: 20   Temp: 99.7 °F (37.6 °C)       I/O last 3 completed shifts:  In: 580 [P.O.:480; IV Piggyback:100]  Out: 2000 [Urine:2000]  I/O this shift:  In: -   Out: 300 [Urine:300]        Laboratories:    Recent Labs   Lab 08/03/20  1426   WBC 8.46   RBC 3.34*   HGB 8.8*   HCT 28.6*      MCV 86   MCH 26.3*   MCHC 30.8*       Recent Labs   Lab 08/03/20  1426   CALCIUM 8.4*   PROT 6.9   *   K 4.7   CO2 19*      BUN 53*   CREATININE 4.0*   ALKPHOS 244*   ALT 34   AST 41*   BILITOT 0.4       No results for input(s): COLORU, CLARITYU, SPECGRAV, PHUR, PROTEINUA, GLUCOSEU, BLOODU, WBCU, RBCU, BACTERIA, MUCUS in the last 24 hours.    Invalid input(s):  BILIRUBINCON    Microbiology Results (last 7 days)     ** No results found for the last 168 hours. **            Diagnostic Tests:  US UPPER EXTREMITY VEINS   Impression:       No evidence of right upper extremity deep venous thrombosis.       Electronically signed by: Porsha Mcgowan MD   Date: 08/03/2020      US LOWER EXTREMITY     FINDINGS:   No evidence of clot involving the bilateral common femoral veins or left greater saphenous, femoral, popliteal, peroneal, anterior and posterior tibial veins.  All venous structures demonstrate normal respiratory phasicity and augment adequately.  No evidence of  soft tissue mass or Baker's cyst.    Impression:       No evidence of left lower extremity deep venous thrombosis.       Electronically signed by: Porsha Mcgowan MD   Date: 08/03/2020      XR CHEST AP PORTABLE  Impression:       1. Central pulmonary vascular congestion associated with interstitial edema, likely representing CHF or pulmonary edema.  Superimposed infectious process not excluded.       Electronically signed by: Rogelio Ellsworth MD   Date: 08/03/2020          Assessment:    56 y/o male recently discharged from [facility] due to toe Osteo admitted with:    - MAURICE on top of CKD-3b  - Hypervolemic - HypoNa+  - Acute resp. Failure with hypoxia  - Anasarca et?  - Mild NAGMA  - COVID-19 infection (+ test 7/16)  - Anemia et?  - HTN  - Uncontrolled DM-2  - Hypoalbuminemia r/o Nephrotic syndrome  - Abnormal troponin et?        Plan:  - Check UA, urine P/C ratio and Uosm  - Renal ADA diet with fluid restriction  - Judicious diuresis  - f/u BMP  - Respiratory Isolation  - Consider ID Consult for Antib. Management and COVID-19 issues  - Glycemic control and other problems per admitting     [FreeTextEntry1] : Blood work obtained.\par Will call with results of blood work.\par DORON will f/u with MRI right shoulder\par rx refilled phentermine and levothyroxine.

## 2020-08-05 LAB
25(OH)D3+25(OH)D2 SERPL-MCNC: 11 NG/ML (ref 30–96)
ANION GAP SERPL CALC-SCNC: 9 MMOL/L (ref 8–16)
BUN SERPL-MCNC: 55 MG/DL (ref 6–20)
CALCIUM SERPL-MCNC: 8.4 MG/DL (ref 8.7–10.5)
CHLORIDE SERPL-SCNC: 102 MMOL/L (ref 95–110)
CO2 SERPL-SCNC: 22 MMOL/L (ref 23–29)
CREAT SERPL-MCNC: 3.7 MG/DL (ref 0.5–1.4)
EST. GFR  (AFRICAN AMERICAN): 20 ML/MIN/1.73 M^2
EST. GFR  (NON AFRICAN AMERICAN): 17 ML/MIN/1.73 M^2
GLUCOSE SERPL-MCNC: 105 MG/DL (ref 70–110)
MAGNESIUM SERPL-MCNC: 1.7 MG/DL (ref 1.6–2.6)
PHOSPHATE SERPL-MCNC: 4 MG/DL (ref 2.7–4.5)
POCT GLUCOSE: 100 MG/DL (ref 70–110)
POCT GLUCOSE: 128 MG/DL (ref 70–110)
POCT GLUCOSE: 129 MG/DL (ref 70–110)
POCT GLUCOSE: 155 MG/DL (ref 70–110)
POTASSIUM SERPL-SCNC: 4.6 MMOL/L (ref 3.5–5.1)
SODIUM SERPL-SCNC: 133 MMOL/L (ref 136–145)

## 2020-08-05 PROCEDURE — 80048 BASIC METABOLIC PNL TOTAL CA: CPT

## 2020-08-05 PROCEDURE — 27000221 HC OXYGEN, UP TO 24 HOURS

## 2020-08-05 PROCEDURE — 63600175 PHARM REV CODE 636 W HCPCS: Performed by: EMERGENCY MEDICINE

## 2020-08-05 PROCEDURE — 25000003 PHARM REV CODE 250: Performed by: HOSPITALIST

## 2020-08-05 PROCEDURE — 25000003 PHARM REV CODE 250: Performed by: EMERGENCY MEDICINE

## 2020-08-05 PROCEDURE — 82306 VITAMIN D 25 HYDROXY: CPT

## 2020-08-05 PROCEDURE — 63600175 PHARM REV CODE 636 W HCPCS: Performed by: HOSPITALIST

## 2020-08-05 PROCEDURE — 94761 N-INVAS EAR/PLS OXIMETRY MLT: CPT

## 2020-08-05 PROCEDURE — 11000001 HC ACUTE MED/SURG PRIVATE ROOM

## 2020-08-05 PROCEDURE — 84100 ASSAY OF PHOSPHORUS: CPT

## 2020-08-05 PROCEDURE — 99232 PR SUBSEQUENT HOSPITAL CARE,LEVL II: ICD-10-PCS | Mod: ,,, | Performed by: PODIATRIST

## 2020-08-05 PROCEDURE — 25000003 PHARM REV CODE 250: Performed by: PODIATRIST

## 2020-08-05 PROCEDURE — 83735 ASSAY OF MAGNESIUM: CPT

## 2020-08-05 PROCEDURE — 63600175 PHARM REV CODE 636 W HCPCS: Performed by: INTERNAL MEDICINE

## 2020-08-05 PROCEDURE — 99232 SBSQ HOSP IP/OBS MODERATE 35: CPT | Mod: ,,, | Performed by: PODIATRIST

## 2020-08-05 RX ORDER — AMLODIPINE BESYLATE 5 MG/1
10 TABLET ORAL DAILY
Status: DISCONTINUED | OUTPATIENT
Start: 2020-08-05 | End: 2020-08-07 | Stop reason: HOSPADM

## 2020-08-05 RX ORDER — HYDRALAZINE HYDROCHLORIDE 25 MG/1
100 TABLET, FILM COATED ORAL EVERY 8 HOURS
Status: DISCONTINUED | OUTPATIENT
Start: 2020-08-05 | End: 2020-08-07 | Stop reason: HOSPADM

## 2020-08-05 RX ORDER — FUROSEMIDE 40 MG/1
40 TABLET ORAL DAILY
Status: DISCONTINUED | OUTPATIENT
Start: 2020-08-05 | End: 2020-08-05

## 2020-08-05 RX ORDER — FUROSEMIDE 10 MG/ML
40 INJECTION INTRAMUSCULAR; INTRAVENOUS
Status: DISCONTINUED | OUTPATIENT
Start: 2020-08-05 | End: 2020-08-07 | Stop reason: HOSPADM

## 2020-08-05 RX ADMIN — HEPARIN SODIUM 5000 UNITS: 5000 INJECTION INTRAVENOUS; SUBCUTANEOUS at 09:08

## 2020-08-05 RX ADMIN — HYDRALAZINE HYDROCHLORIDE 100 MG: 25 TABLET, FILM COATED ORAL at 01:08

## 2020-08-05 RX ADMIN — HYDRALAZINE HYDROCHLORIDE 50 MG: 25 TABLET ORAL at 06:08

## 2020-08-05 RX ADMIN — ACETAMINOPHEN 650 MG: 325 TABLET ORAL at 11:08

## 2020-08-05 RX ADMIN — Medication: at 09:08

## 2020-08-05 RX ADMIN — PIPERACILLIN SODIUM, TAZOBACTAM SODIUM 4.5 G: 4; .5 INJECTION, POWDER, LYOPHILIZED, FOR SOLUTION INTRAVENOUS at 01:08

## 2020-08-05 RX ADMIN — HEPARIN SODIUM 5000 UNITS: 5000 INJECTION INTRAVENOUS; SUBCUTANEOUS at 05:08

## 2020-08-05 RX ADMIN — ASPIRIN 325 MG: 325 TABLET, DELAYED RELEASE ORAL at 09:08

## 2020-08-05 RX ADMIN — HYDRALAZINE HYDROCHLORIDE 100 MG: 25 TABLET, FILM COATED ORAL at 09:08

## 2020-08-05 RX ADMIN — ACETAMINOPHEN 650 MG: 325 TABLET ORAL at 09:08

## 2020-08-05 RX ADMIN — HEPARIN SODIUM 5000 UNITS: 5000 INJECTION INTRAVENOUS; SUBCUTANEOUS at 01:08

## 2020-08-05 RX ADMIN — FUROSEMIDE 40 MG: 10 INJECTION, SOLUTION INTRAMUSCULAR; INTRAVENOUS at 09:08

## 2020-08-05 RX ADMIN — AMLODIPINE BESYLATE 10 MG: 5 TABLET ORAL at 11:08

## 2020-08-05 RX ADMIN — PIPERACILLIN SODIUM, TAZOBACTAM SODIUM 4.5 G: 4; .5 INJECTION, POWDER, LYOPHILIZED, FOR SOLUTION INTRAVENOUS at 12:08

## 2020-08-05 RX ADMIN — CLONIDINE HYDROCHLORIDE 0.2 MG: 0.1 TABLET ORAL at 12:08

## 2020-08-05 NOTE — PLAN OF CARE
Problem: Fall Injury Risk  Goal: Absence of Fall and Fall-Related Injury  Outcome: Ongoing, Progressing     Problem: Adult Inpatient Plan of Care  Goal: Plan of Care Review  Outcome: Ongoing, Progressing  Goal: Patient-Specific Goal (Individualization)  Outcome: Ongoing, Progressing  Goal: Absence of Hospital-Acquired Illness or Injury  Outcome: Ongoing, Progressing  Goal: Optimal Comfort and Wellbeing  Outcome: Ongoing, Progressing  Goal: Readiness for Transition of Care  Outcome: Ongoing, Progressing  Goal: Rounds/Family Conference  Outcome: Ongoing, Progressing     Problem: Diabetes Comorbidity  Goal: Blood Glucose Level Within Desired Range  Outcome: Ongoing, Progressing   Protein urine began at 1700.  Patient aware all urine is collected for 24 hours.  Placed on ice. Podiatry changed this patient's dressing today. No acute distress

## 2020-08-05 NOTE — PLAN OF CARE
Patient will resume care with Enoc Hernandez  and [facility] when discharged.        08/05/20 1118   Post-Acute Status   Post-Acute Authorization Home Health  (resumption of care with Enoc Hernandez)   Home Health Status Awaiting Orders for HH   Discharge Delays None known at this time   Discharge Plan   Discharge Plan A [facility]

## 2020-08-05 NOTE — ASSESSMENT & PLAN NOTE
Likely secondary to acute pulmonary edema, continue supplemental oxygen, diuresis with IV Lasix, and afterload reduction as tolerated, echo show preserved EF,, I&Os, daily weights.  stable on 2 liter Nc O 2.

## 2020-08-05 NOTE — PLAN OF CARE
Patient remains free from injury and falls. Remains afebrile. Oxygen sats remain WNL on 2 liters nasal cannula. Patient able to make needs known. IV antibiotic administered as ordered. Plan of care continued.

## 2020-08-05 NOTE — PROGRESS NOTES
[facility] Hospital Medicine  Progress Note    Patient Name: Catalino Mcfadden  MRN: [id number]  Patient Class: IP- Inpatient   Admission Date: 8/3/2020  Length of Stay: 2 days  Attending Physician: Laura Crowder MD  Primary Care Provider: Azikiwe K Lombard, MD        Subjective:     Principal Problem:Acute respiratory failure with hypoxia        HPI:  55 y.o. male with hypertension, hyperlipidemia, dm 2, CKD stage 3, osteomyelitis of left foot on home IV antibiotics via PICC line, and recent diagnosis of COVID-19 infection presents with a complaint of shortness of breath.  Acute onset yesterday, associated with extensive edema to the bilateral lower extremities up to the level of the abdomen and arms with abdominal distension.  SOB exacerbated with exertion.  Denies fever, chills, cough, chest pain, palpitations, dizziness, syncope, nausea, vomiting, diarrhea, abdominal pain, bloody black stool, or dysuria.  In the ED, chest x-ray reveals likely pulmonary edema; however, changes secondary to COVID-19 infection not excluded.  Labs show acute kidney injury creatinine 4.0, increased above baseline.  He was found to be mildly hypoxic on room air requiring supplemental oxygen.  Troponin also elevated at 0.118.  ASA, full-dose Lovenox, and IV Lasix initiated.  Admitted to hospital medicine for further evaluation and treatment.    Overview/Hospital Course:  55 y.o. male with hypertension, hyperlipidemia, dm 2, CKD stage 3, osteomyelitis of left foot on home IV antibiotics via PICC line, and recent diagnosis of COVID-19 infection presents with a complaint of shortness of breath.  Acute onset day before admission,, associated with extensive edema to the bilateral lower extremities up to the level of the abdomen and arms with abdominal distension.  SOB exacerbated with exertion.  Denies fever, chills, cough, chest pain, palpitations, dizziness, syncope, nausea, vomiting, diarrhea, abdominal pain, bloody  black stool, or dysuria.  In the ED, chest x-ray reveals likely pulmonary edema; however, changes secondary to COVID-19 infection not excluded.  Labs show acute kidney injury creatinine 4.0, increased above baseline.  He was found to be mildly hypoxic on room air requiring supplemental oxygen.  Troponin also elevated at 0.118.  ASA, full-dose Lovenox, and IV Lasix initiated.  Admitted to hospital medicine for further evaluation and treatment.echo show preserved EF,will hold lasix at this time,he is stabe on 2 liter NC O 2,nephroplogy is consulted for worsening CKD.  CRT is improved today.stable on 2 liter Nc O 2.    Past Medical History:   Diagnosis Date    CKD (chronic kidney disease), stage III 07/16/2020    CKD stage 3 due to type 1 diabetes mellitus     CKD stage 3 due to type 2 diabetes mellitus     Diabetes mellitus, type 2     Diabetic foot ulcer associated with diabetes mellitus due to underlying condition 07/16/2020    Diabetic foot ulcers     Edema 08/03/2020    generalized, including genital area    Hyperlipidemia     Hypertension     Obese        Past Surgical History:   Procedure Laterality Date    BONE BIOPSY Left 7/17/2020    Procedure: BIOPSY, BONE;  Surgeon: Verona Whitmore DPM;  Location: [facility] OR;  Service: Podiatry;  Laterality: Left;    CERVICAL DISC SURGERY  07/11/2016    DEBRIDEMENT Left 7/17/2020    Procedure: DEBRIDEMENT;  Surgeon: Verona Whitmore DPM;  Location: [facility] OR;  Service: Podiatry;  Laterality: Left;    DEBRIDEMENT OF FOOT Right     toes & plantar surface    FRACTURE SURGERY Right     medial ankle, metal plate present    left leg orthopedic surgery Left        Review of patient's allergies indicates:  No Known Allergies    No current facility-administered medications on file prior to encounter.      Current Outpatient Medications on File Prior to Encounter   Medication Sig    amLODIPine (NORVASC) 10 MG tablet Take 1 tablet (10 mg total) by mouth once daily.     "carvediloL (COREG) 25 MG tablet Take 1 tablet (25 mg total) by mouth 2 (two) times daily.    gabapentin (NEURONTIN) 300 MG capsule Take 1 capsule (300 mg total) by mouth 2 (two) times daily.    hydrALAZINE (APRESOLINE) 50 MG tablet Take 1 tablet (50 mg total) by mouth every 8 (eight) hours.    insulin detemir U-100 (LEVEMIR FLEXTOUCH) 100 unit/mL (3 mL) SubQ InPn pen Inject 20 Units into the skin 2 (two) times daily.    atorvastatin (LIPITOR) 40 MG tablet Take 1 tablet (40 mg total) by mouth once daily.    blood sugar diagnostic Strp 1 strip by Misc.(Non-Drug; Combo Route) route 3 (three) times daily. Patient needs One Touch Test Strips (Berio).    insulin aspart U-100 (NOVOLOG) 100 unit/mL (3 mL) InPn pen Inject 10 Units into the skin 3 (three) times daily with meals. (Patient not taking: Reported on 8/3/2020)    pen needle, diabetic 32 gauge x 3/16" Ndle 1 each by Misc.(Non-Drug; Combo Route) route 5 (five) times daily.    TRULICITY 0.75 mg/0.5 mL PnIj INJECT 0.5 ML UNDER THE SKIN EVERY 7 DAYS     Family History     Problem Relation (Age of Onset)    Heart disease Father        Tobacco Use    Smoking status: Never Smoker    Smokeless tobacco: Never Used   Substance and Sexual Activity    Alcohol use: Not Currently     Alcohol/week: 0.0 standard drinks     Comment: social    Drug use: No    Sexual activity: Not on file     Review of Systems   Constitutional: Negative for chills and fever.   Eyes: Negative for photophobia and visual disturbance.   Respiratory: Negative for cough, shortness of breath and wheezing.    Cardiovascular: Positive for leg swelling. Negative for chest pain and palpitations.   Gastrointestinal: Positive for abdominal distention. Negative for abdominal pain, diarrhea, nausea and vomiting.   Genitourinary: Positive for scrotal swelling. Negative for frequency, hematuria and urgency.   Skin: Negative for pallor, rash and wound.   Neurological: Negative for light-headedness and " headaches.   Psychiatric/Behavioral: Negative for confusion and decreased concentration.     Objective:     Vital Signs (Most Recent):  Temp: 98.1 °F (36.7 °C) (08/05/20 0748)  Pulse: 90 (08/05/20 0748)  Resp: 18 (08/05/20 0748)  BP: (!) 181/88 (08/05/20 0748)  SpO2: 95 % (08/05/20 0748) Vital Signs (24h Range):  Temp:  [98.1 °F (36.7 °C)-99.2 °F (37.3 °C)] 98.1 °F (36.7 °C)  Pulse:  [] 90  Resp:  [18-22] 18  SpO2:  [91 %-98 %] 95 %  BP: (141-181)/(78-93) 181/88     Weight: 136 kg (299 lb 13.2 oz)  Body mass index is 39.56 kg/m².    Physical Exam  Vitals signs and nursing note reviewed.   Constitutional:       General: He is not in acute distress.     Appearance: He is well-developed.   HENT:      Head: Normocephalic and atraumatic.      Right Ear: External ear normal.      Left Ear: External ear normal.      Nose: Nose normal.   Eyes:      Conjunctiva/sclera: Conjunctivae normal.      Pupils: Pupils are equal, round, and reactive to light.   Neck:      Musculoskeletal: Normal range of motion and neck supple.   Cardiovascular:      Rate and Rhythm: Normal rate and regular rhythm.   Pulmonary:      Effort: Tachypnea and accessory muscle usage present. No respiratory distress.      Breath sounds: Decreased breath sounds present. No wheezing or rales.   Abdominal:      General: Abdomen is protuberant. Bowel sounds are normal. There is no distension.      Palpations: Abdomen is soft.      Tenderness: There is no abdominal tenderness.      Comments: No palpable hepatomegaly or splenomegaly   Musculoskeletal: Normal range of motion.         General: No tenderness.      Right lower leg: Edema present.      Left lower leg: Edema present.   Skin:     General: Skin is warm and dry.   Neurological:      Mental Status: He is alert and oriented to person, place, and time.   Psychiatric:         Thought Content: Thought content normal.           CRANIAL NERVES     CN III, IV, VI   Pupils are equal, round, and reactive to  light.       Significant Labs: All pertinent labs within the past 24 hours have been reviewed.    Significant Imaging: I have reviewed all pertinent imaging results/findings within the past 24 hours.      Assessment/Plan:      * Acute respiratory failure with hypoxia  Likely secondary to acute pulmonary edema, continue supplemental oxygen, diuresis with IV Lasix, and afterload reduction as tolerated, echo show preserved EF,, I&Os, daily weights.  stable on 2 liter Nc O 2.    Elevated troponin  NSTEMI or demand from hypoxia in setting of impaired renal function, continue ASA , hold BBl secondary to possible acute heart failure, hold ACEi/ARB due to renal function.  Monitor on tele, obtain serial markers.echo show preserved EF.      Osteomyelitis of left foot  Stable,continue home IV antibiotics with zosyn,deanne has picc line,untill 9.1.20,zac follow up with podiatry as out patient,seen by podiatry.    Acute kidney injury superimposed on chronic kidney disease  Baseline CKD stage 3, cautious diuresis with IV Lasix, nephrology consult, appreciate recs. strict I/O's, monitor renal function and electrolytes, avoid nephrotoxic agents. CRT is improved today.    Hyponatremia  Improved, mild, asymptomatic, hypervolemic.  Continue cautious diuresis with IV Lasix.    COVID-19 virus infection  Positive test 07/16/2020, symptoms had resolved shortly thereafter. , but no fever or cough.  Continue isolation for now.    Hyperlipidemia, acquired  Continue statin.    Essential hypertension  Poorly controlled, hold BBl due to possible heart failure, continue amlodipine and monitor blood pressure, adjust as needed.     Uncontrolled type 2 diabetes mellitus with stage 3 chronic kidney disease, without long-term current use of insulin  Last HgbA1c   Lab Results   Component Value Date    HGBA1C 8.1 (H) 07/17/2020     Hold oral antihyperglycemics while inpatient  PRN sliding scale insulin  ACHS glucose monitoring   ADA diet       VTE  Risk Mitigation (From admission, onward)         Ordered     heparin (porcine) injection 5,000 Units  Every 8 hours      08/04/20 1136                      Laura Crowder MD  Department of Hospital Medicine   [facility]

## 2020-08-05 NOTE — PROGRESS NOTES
[facility]  Podiatry  Consult Note    Patient Name: Catalino Mcfadden  MRN: [id number]  Admission Date: 8/3/2020  Hospital Length of Stay: 2 days  Attending Physician: Laura Crowder MD  Primary Care Provider: Azikiwe K Lombard, MD     Consults  Subjective:     History of Present Illness:   Catalino Mcfadden is a 55 y.o. male with  has a past medical history of CKD (chronic kidney disease), stage III, CKD stage 3 due to type 1 diabetes mellitus, CKD stage 3 due to type 2 diabetes mellitus, Diabetes mellitus, type 2, Diabetic foot ulcer associated with diabetes mellitus due to underlying condition, Diabetic foot ulcers, Edema, Hyperlipidemia, Hypertension, and Obese.  Consult to Podiatry for evaluation and treatment of left foot ulcer.  Patient is known to our service.  On last admit was treated for osteomyelitis to the 5th metatarsal.  During admit patient was given the choice between ray amputation or IV antibiotics with wound care.  Patient elected for antibiotics and wound care. Patient is status post surgical debridement with bone biopsy on 07/17/2020 by Dr. Verona Whitmore. He was last seen by me in [facility] on Friday and orders were placed to initiate a wound VAC in the outpatient setting.  Until that time patient was having home health dressing changes twice weekly.   Denies pedal pain. CAM boot intact left foot.  No new pedal complaints.  Patient admitted for non podiatric complaint.    8/5/20: Patient seen bedside. CAM boot intact. No new issues.     Chief Complaint   Patient presents with    Shortness of Breath     started  yesterday    Bloated    Leg Swelling     edema getting worse since starting abx    Wheezing     started yesterday         Scheduled Meds:   amLODIPine  10 mg Oral Daily    aspirin  325 mg Oral Daily    cadexomer iodine   Topical (Top) Daily    furosemide (LASIX) IV  40 mg Intravenous Q12H    heparin (porcine)  5,000 Units Subcutaneous Q8H    hydrALAZINE   100 mg Oral Q8H    piperacillin-tazobactam (ZOSYN) IVPB  4.5 g Intravenous Q12H     Continuous Infusions:  PRN Meds:acetaminophen, cloNIDine, dextrose 50%, dextrose 50%, glucagon (human recombinant), glucose, glucose, insulin aspart U-100    Review of patient's allergies indicates:  No Known Allergies     Past Medical History:   Diagnosis Date    CKD (chronic kidney disease), stage III 07/16/2020    CKD stage 3 due to type 1 diabetes mellitus     CKD stage 3 due to type 2 diabetes mellitus     Diabetes mellitus, type 2     Diabetic foot ulcer associated with diabetes mellitus due to underlying condition 07/16/2020    Diabetic foot ulcers     Edema 08/03/2020    generalized, including genital area    Hyperlipidemia     Hypertension     Obese      Past Surgical History:   Procedure Laterality Date    BONE BIOPSY Left 7/17/2020    Procedure: BIOPSY, BONE;  Surgeon: Verona Whitmore DPM;  Location: [facility] OR;  Service: Podiatry;  Laterality: Left;    CERVICAL DISC SURGERY  07/11/2016    DEBRIDEMENT Left 7/17/2020    Procedure: DEBRIDEMENT;  Surgeon: Vernoa Whitmore DPM;  Location: [facility] OR;  Service: Podiatry;  Laterality: Left;    DEBRIDEMENT OF FOOT Right     toes & plantar surface    FRACTURE SURGERY Right     medial ankle, metal plate present    left leg orthopedic surgery Left        Family History     Problem Relation (Age of Onset)    Heart disease Father        Tobacco Use    Smoking status: Never Smoker    Smokeless tobacco: Never Used   Substance and Sexual Activity    Alcohol use: Not Currently     Alcohol/week: 0.0 standard drinks     Comment: social    Drug use: No    Sexual activity: Not on file     Review of Systems   Constitutional: Positive for activity change and fatigue. Negative for appetite change, chills and fever.   Respiratory: Positive for shortness of breath and wheezing. Negative for cough.    Cardiovascular: Positive for leg swelling. Negative for chest pain.    Gastrointestinal: Positive for abdominal distention. Negative for diarrhea, nausea and vomiting.   Musculoskeletal: Positive for arthralgias, joint swelling and myalgias.   Skin: Positive for wound.   Neurological: Positive for numbness and headaches. Negative for weakness.        + paresthesia      Objective:     Vital Signs (Most Recent):  Temp: 99 °F (37.2 °C) (08/05/20 1118)  Pulse: 90 (08/05/20 1118)  Resp: 18 (08/05/20 1118)  BP: (!) 177/82 (08/05/20 1118)  SpO2: (!) 94 % (08/05/20 1118) Vital Signs (24h Range):  Temp:  [98.1 °F (36.7 °C)-99.2 °F (37.3 °C)] 99 °F (37.2 °C)  Pulse:  [86-98] 90  Resp:  [18-22] 18  SpO2:  [91 %-97 %] 94 %  BP: (141-181)/(79-93) 177/82     Weight: 136 kg (299 lb 13.2 oz)  Body mass index is 39.56 kg/m².    Physical Exam  Vitals signs and nursing note reviewed.   Constitutional:       General: He is not in acute distress.     Appearance: He is not toxic-appearing or diaphoretic.      Comments: Pt. is well-developed, well-nourished, appears stated age, in no acute distress, alert and oriented x 3. No evidence of depression, anxiety, or agitation. Calm, cooperative, and communicative. Appropriate interactions and affect.   Cardiovascular:      Pulses:           Dorsalis pedis pulses are 2+ on the right side and 2+ on the left side.        Posterior tibial pulses are 1+ on the right side and 1+ on the left side.      Comments: There is decreased digital hair. Skin is atrophic, slightly hyperpigmented. Edema to BLE  Pulmonary:      Effort: No respiratory distress.   Musculoskeletal:      Right ankle: He exhibits normal range of motion. No tenderness. No lateral malleolus, no medial malleolus, no AITFL, no CF ligament and no posterior TFL tenderness found. Achilles tendon exhibits no pain, no defect and normal Hilliard's test results.      Left ankle: He exhibits normal range of motion . No tenderness. No lateral malleolus, no medial malleolus, no AITFL, no CF ligament and no  posterior TFL tenderness found. Achilles tendon exhibits no pain, no defect and normal Hilliard's test results.      Right foot: No tenderness or bony tenderness.      Left foot: No tenderness or bony tenderness.      Comments: Decreased stride, station of gait.  apropulsive toe off.  Increased angle and base of gait.    There is equinus deformity bilateral with decreased dorsiflexion at the ankle joint bilateral. No tenderness with compression of heel. Negative tinels sign. Gait analysis reveals excessive pronation through midstance and propulsion with early heel off.     Patient has hammertoes of digits 2-5 bilateral partially reducible     Decreased first MPJ range of motion both weightbearing and nonweightbearing, no crepitus observed the first MP joint, + dorsal flag sign.     Visible and palpable bunion without pain at dorsomedial 1st metatarsal head right and left.  Hallux abducted right and left partially reducible, tracks laterally without being track bound.  No ecchymosis, erythema, edema, or cardinal signs infection or signs of trauma same foot.    Fat pad atrophy to heels and met heads bilateral    Plantarflexed 1st ray RLE   Lymphadenopathy:      Comments: No lymphatic streaking    Negative lymphadenopathy bilateral popliteal fossa and tarsal tunnel.     Skin:     General: Skin is warm and dry.      Coloration: Skin is not pale.      Findings: Erythema and lesion (wound description) present. No abrasion, ecchymosis, laceration or rash.      Nails: There is no clubbing.     8/5/20:                         Comments: Ulcer location: Left  lateral 5th metatarsal base     Present Prior to Hospital Arrival?: Yes   Side: Left   Location: Foot   Orientation: plantar lateral     Wound Image 08/04/2020           Incision WDL ex   Dressing Appearance Moist drainage   Drainage Amount Moderate   Drainage Characteristics/Odor Serosanguineous   Appearance Pink;Red;Yellow;Slough;Sutures intact   Black (%), Wound Tissue  Color 0 %   Red (%), Wound Tissue Color 70 %   Yellow (%), Wound Tissue Color 30 %   Periwound Area Macerated   Wound Edges Open   Wound Length (cm) 4.5 cm   Wound Width (cm) 3.5 cm   Wound Depth (cm) 1.1 cm   Wound Volume (cm^3) 17.32 cm^3   Wound Surface Area (cm^2) 15.75 cm^2       Toenails 1-5 bilaterally discolored/yellowed, dystrophic, brittle with subungual debris.    Neurological:      Sensory: Sensory deficit present.      Comments:  Pateros-Dayton 5.07 monofilamant testing is diminished Kp feet. Decreased/absent vibratory sensation bilateral feet to 128Hz tuning fork.     Paresthesias, and hyperesthesia bilateral feet with no clearly identified trigger or source.     Psychiatric:         Attention and Perception: He is attentive.         Mood and Affect: Mood is not anxious. Affect is not inappropriate.         Speech: He is communicative. Speech is not slurred.         Behavior: Behavior is not combative.          Laboratory:  A1C:   Recent Labs   Lab 07/17/20  0455   HGBA1C 8.1*     CBC:   Recent Labs   Lab 08/03/20  1426   WBC 8.46   RBC 3.34*   HGB 8.8*   HCT 28.6*      MCV 86   MCH 26.3*   MCHC 30.8*     CMP:   Recent Labs   Lab 08/03/20  1426 08/05/20  0522   GLU 93 105   CALCIUM 8.4* 8.4*   ALBUMIN 2.1*  --    PROT 6.9  --    * 133*   K 4.7 4.6   CO2 19* 22*    102   BUN 53* 55*   CREATININE 4.0* 3.7*   ALKPHOS 244*  --    ALT 34  --    AST 41*  --    BILITOT 0.4  --      CRP:   Recent Labs   Lab 08/03/20  1034   CRP 38.0*     ESR:   Recent Labs   Lab 08/03/20  1034   SEDRATE 134*     Microbiology Results (last 7 days)     ** No results found for the last 168 hours. **          Diagnostic Results:   Imaging Results          US Lower Extremity Veins Right (Final result)  Result time 08/03/20 18:22:16    Final result by Porsha Mcgowan MD (08/03/20 18:22:16)                 Impression:      No evidence of left lower extremity deep venous thrombosis.      Electronically signed  by: Porsha Mcgowan MD  Date:    08/03/2020  Time:    18:22             Narrative:    EXAMINATION:  US LOWER EXTREMITY VEINS RIGHT    CLINICAL HISTORY:  Edema, unspecified    TECHNIQUE:  Duplex and color flow Doppler evaluation of the left lower extremity veins was performed.    COMPARISON:  None    FINDINGS:  No evidence of clot involving the bilateral common femoral veins or left greater saphenous, femoral, popliteal, peroneal, anterior and posterior tibial veins.  All venous structures demonstrate normal respiratory phasicity and augment adequately.  No evidence of soft tissue mass or Baker's cyst.                               US Upper Extremity Veins Right (Final result)  Result time 08/03/20 18:24:28    Final result by Porsha Mcgowan MD (08/03/20 18:24:28)                 Impression:      No evidence of right upper extremity deep venous thrombosis.      Electronically signed by: Porsha Mcgowan MD  Date:    08/03/2020  Time:    18:24             Narrative:    EXAMINATION:  US UPPER EXTREMITY VEINS RIGHT    CLINICAL HISTORY:  RUE edema with PICC line;    TECHNIQUE:  Duplex and color flow Doppler evaluation and dynamic compression was performed of the upper extremity veins.    COMPARISON:  None    FINDINGS:  PICC line seen on the right.  No evidence of clot involving the bilateral jugular and subclavian veins or right axillary, brachial, basilic and cephalic veins.  All venous structures demonstrate normal respiratory phasicity and augment adequately.  No soft tissue masses.                               X-Ray Chest AP Portable (Final result)  Result time 08/03/20 16:23:08    Final result by Rogelio Ellsworth MD (08/03/20 16:23:08)                 Impression:      1. Central pulmonary vascular congestion associated with interstitial edema, likely representing CHF or pulmonary edema.  Superimposed infectious process not excluded.      Electronically signed by: Rogelio Ellsworth MD  Date:    08/03/2020  Time:    16:23              Narrative:    EXAMINATION:  XR CHEST AP PORTABLE    CLINICAL HISTORY:  Shortness of Breath;    TECHNIQUE:  Single frontal view of the chest was performed.    COMPARISON:  Chest 07/20/2020    FINDINGS:  There is enlargement of the cardiac silhouette.  There is presence of a right PICC line in unchanged position.    Since the previous examination there is significant central pulmonary vascular congestion with prominence of the interstitial markings, suspicious for CHF or pulmonary edema.  Superimposed infectious process not excluded.  There is no pleural effusions.    Postoperative changes from cervical disc prosthesis in the lower cervical spine noted.  There are cardiac monitoring leads over the chest.                                  Assessment/Plan:     Active Diagnoses:    Diagnosis Date Noted POA    PRINCIPAL PROBLEM:  Acute respiratory failure with hypoxia [J96.01] 08/03/2020 Yes    Elevated troponin [R79.89] 08/03/2020 Yes    Osteomyelitis of left foot [M86.9]  Yes    Acute kidney injury superimposed on chronic kidney disease [N17.9, N18.9] 07/16/2020 Yes    Hyponatremia [E87.1] 07/16/2020 Yes    COVID-19 virus infection [U07.1] 07/16/2020 Yes    Hyperlipidemia, acquired [E78.5] 12/24/2014 Yes     Chronic    Uncontrolled type 2 diabetes mellitus with stage 3 chronic kidney disease, without long-term current use of insulin [E11.22, E11.65, N18.3] 08/26/2014 Yes     Chronic    Essential hypertension [I10] 08/26/2014 Yes     Chronic      Problems Resolved During this Admission:         Greater than 50% of this visit spent on counseling and coordination of care.    Education about the prevention of limb loss.    Discussed wound healing cycle, skin integrity, ways to care for skin.Counseled patient on the effects of blood glucose on healing. He verbalizes understanding that it can increase the chances of delayed healing and this prolonged exposure leads to infection or progression of infection  which subsequently can result in loss of limb.    The wound is cleansed of foreign material as much as possible and the base inspected for bone or abscess.  Base is fibrogranular with bone also noted.  Bone biopsy acquired on previous admit.  Patient already evaluated and treated by Infectious Disease with plans for Zosyn until September.  PICC line in place.  No surgical intervention indicated at this time.    Wound is stable.  There were plans for application of wound VAC in the outpatient setting.  While inpatient patient will have nursing dressing changes with Iodosorb and dry sterile dressings daily.  If admit extends beyond 5 days will place wound VAC on inpatient.  On discharge he will continue to follow with me in the [facility].      Thank you for your consult. I will follow-up with patient. Please contact us if you have any additional questions.    Verona Whitmore DPM  Podiatry  [facility]

## 2020-08-05 NOTE — PROGRESS NOTES
TN spoke with Janice @ [phone] with [facility] concerning resumption of IV abx's infusion after patient is discharged. TN also spoke Josselyn @ [phone] with [facility] concerning pending resumption of home healthcare services. Discharge Orders will be sent to both as soon as they become available.

## 2020-08-05 NOTE — ASSESSMENT & PLAN NOTE
Stable,continue home IV antibiotics with zosyn,deanne has picc line,untill 9.1.20,zac follow up with podiatry as out patient,seen by podiatry.

## 2020-08-05 NOTE — SUBJECTIVE & OBJECTIVE
Past Medical History:   Diagnosis Date    CKD (chronic kidney disease), stage III 07/16/2020    CKD stage 3 due to type 1 diabetes mellitus     CKD stage 3 due to type 2 diabetes mellitus     Diabetes mellitus, type 2     Diabetic foot ulcer associated with diabetes mellitus due to underlying condition 07/16/2020    Diabetic foot ulcers     Edema 08/03/2020    generalized, including genital area    Hyperlipidemia     Hypertension     Obese        Past Surgical History:   Procedure Laterality Date    BONE BIOPSY Left 7/17/2020    Procedure: BIOPSY, BONE;  Surgeon: Verona Whitmore DPM;  Location: [facility] OR;  Service: Podiatry;  Laterality: Left;    CERVICAL DISC SURGERY  07/11/2016    DEBRIDEMENT Left 7/17/2020    Procedure: DEBRIDEMENT;  Surgeon: Verona Whitmore DPM;  Location: [facility] OR;  Service: Podiatry;  Laterality: Left;    DEBRIDEMENT OF FOOT Right     toes & plantar surface    FRACTURE SURGERY Right     medial ankle, metal plate present    left leg orthopedic surgery Left        Review of patient's allergies indicates:  No Known Allergies    No current facility-administered medications on file prior to encounter.      Current Outpatient Medications on File Prior to Encounter   Medication Sig    amLODIPine (NORVASC) 10 MG tablet Take 1 tablet (10 mg total) by mouth once daily.    carvediloL (COREG) 25 MG tablet Take 1 tablet (25 mg total) by mouth 2 (two) times daily.    gabapentin (NEURONTIN) 300 MG capsule Take 1 capsule (300 mg total) by mouth 2 (two) times daily.    hydrALAZINE (APRESOLINE) 50 MG tablet Take 1 tablet (50 mg total) by mouth every 8 (eight) hours.    insulin detemir U-100 (LEVEMIR FLEXTOUCH) 100 unit/mL (3 mL) SubQ InPn pen Inject 20 Units into the skin 2 (two) times daily.    atorvastatin (LIPITOR) 40 MG tablet Take 1 tablet (40 mg total) by mouth once daily.    blood sugar diagnostic Strp 1 strip by Misc.(Non-Drug; Combo Route) route 3 (three) times daily. Patient  "needs One Touch Test Strips (Berio).    insulin aspart U-100 (NOVOLOG) 100 unit/mL (3 mL) InPn pen Inject 10 Units into the skin 3 (three) times daily with meals. (Patient not taking: Reported on 8/3/2020)    pen needle, diabetic 32 gauge x 3/16" Ndle 1 each by Misc.(Non-Drug; Combo Route) route 5 (five) times daily.    TRULICITY 0.75 mg/0.5 mL PnIj INJECT 0.5 ML UNDER THE SKIN EVERY 7 DAYS     Family History     Problem Relation (Age of Onset)    Heart disease Father        Tobacco Use    Smoking status: Never Smoker    Smokeless tobacco: Never Used   Substance and Sexual Activity    Alcohol use: Not Currently     Alcohol/week: 0.0 standard drinks     Comment: social    Drug use: No    Sexual activity: Not on file     Review of Systems   Constitutional: Negative for chills and fever.   Eyes: Negative for photophobia and visual disturbance.   Respiratory: Negative for cough, shortness of breath and wheezing.    Cardiovascular: Positive for leg swelling. Negative for chest pain and palpitations.   Gastrointestinal: Positive for abdominal distention. Negative for abdominal pain, diarrhea, nausea and vomiting.   Genitourinary: Positive for scrotal swelling. Negative for frequency, hematuria and urgency.   Skin: Negative for pallor, rash and wound.   Neurological: Negative for light-headedness and headaches.   Psychiatric/Behavioral: Negative for confusion and decreased concentration.     Objective:     Vital Signs (Most Recent):  Temp: 98.1 °F (36.7 °C) (08/05/20 0748)  Pulse: 90 (08/05/20 0748)  Resp: 18 (08/05/20 0748)  BP: (!) 181/88 (08/05/20 0748)  SpO2: 95 % (08/05/20 0748) Vital Signs (24h Range):  Temp:  [98.1 °F (36.7 °C)-99.2 °F (37.3 °C)] 98.1 °F (36.7 °C)  Pulse:  [] 90  Resp:  [18-22] 18  SpO2:  [91 %-98 %] 95 %  BP: (141-181)/(78-93) 181/88     Weight: 136 kg (299 lb 13.2 oz)  Body mass index is 39.56 kg/m².    Physical Exam  Vitals signs and nursing note reviewed.   Constitutional:       " General: He is not in acute distress.     Appearance: He is well-developed.   HENT:      Head: Normocephalic and atraumatic.      Right Ear: External ear normal.      Left Ear: External ear normal.      Nose: Nose normal.   Eyes:      Conjunctiva/sclera: Conjunctivae normal.      Pupils: Pupils are equal, round, and reactive to light.   Neck:      Musculoskeletal: Normal range of motion and neck supple.   Cardiovascular:      Rate and Rhythm: Normal rate and regular rhythm.   Pulmonary:      Effort: Tachypnea and accessory muscle usage present. No respiratory distress.      Breath sounds: Decreased breath sounds present. No wheezing or rales.   Abdominal:      General: Abdomen is protuberant. Bowel sounds are normal. There is no distension.      Palpations: Abdomen is soft.      Tenderness: There is no abdominal tenderness.      Comments: No palpable hepatomegaly or splenomegaly   Musculoskeletal: Normal range of motion.         General: No tenderness.      Right lower leg: Edema present.      Left lower leg: Edema present.   Skin:     General: Skin is warm and dry.   Neurological:      Mental Status: He is alert and oriented to person, place, and time.   Psychiatric:         Thought Content: Thought content normal.           CRANIAL NERVES     CN III, IV, VI   Pupils are equal, round, and reactive to light.       Significant Labs: All pertinent labs within the past 24 hours have been reviewed.    Significant Imaging: I have reviewed all pertinent imaging results/findings within the past 24 hours.

## 2020-08-05 NOTE — ASSESSMENT & PLAN NOTE
Baseline CKD stage 3, cautious diuresis with IV Lasix, nephrology consult, appreciate recs. strict I/O's, monitor renal function and electrolytes, avoid nephrotoxic agents. CRT is improved today.

## 2020-08-05 NOTE — PROGRESS NOTES
Renal Progress Note    Date of Admission:  8/3/2020  1:51 PM    Length of Stay: 2  Days    Subjective: n/a    Objective:    Current Facility-Administered Medications   Medication    acetaminophen tablet 650 mg    aspirin EC tablet 325 mg    cadexomer iodine 0.9 % gel    cloNIDine tablet 0.2 mg    dextrose 50% injection 12.5 g    dextrose 50% injection 25 g    furosemide tablet 40 mg    glucagon (human recombinant) injection 1 mg    glucose chewable tablet 16 g    glucose chewable tablet 24 g    heparin (porcine) injection 5,000 Units    hydrALAZINE tablet 50 mg    insulin aspart U-100 pen 0-5 Units    piperacillin-tazobactam 4.5 g in sodium chloride 0.9% 100 mL IVPB (ready to mix system)       Vitals:    08/04/20 2132 08/05/20 0002 08/05/20 0504 08/05/20 0748   BP:  (!) 161/83 (!) 141/86 (!) 181/88   BP Location:  Left arm  Left arm   Patient Position:  Lying  Lying   Pulse:  90 87 90   Resp:  19 (!) 21 18   Temp:  99.2 °F (37.3 °C) 99 °F (37.2 °C) 98.1 °F (36.7 °C)   TempSrc:  Oral  Oral   SpO2: (!) 94% (!) 92% 95% 95%   Weight:       Height:           I/O last 3 completed shifts:  In: 1160 [P.O.:960; IV Piggyback:200]  Out: 4850 [Urine:4850]  No intake/output data recorded.      Physical Exam: Deferred / covid-19 + on isolation    General: NAD  Neck: supple  Heart: RRR  Lungs: unlabored breathing  Abdomen: n/a  Limbs: n/a  Neurologic: AAO x 3      Laboratories:    No results for input(s): WBC, RBC, HGB, HCT, PLT, MCV, MCH, MCHC in the last 24 hours.    Recent Labs   Lab 08/05/20  0522   CALCIUM 8.4*   *   K 4.6   CO2 22*      BUN 55*   CREATININE 3.7*       Recent Labs   Lab 08/04/20  1016   COLORU Colorless*   SPECGRAV 1.005   PHUR 5.0   PROTEINUA 1+*   BACTERIA None       Microbiology Results (last 7 days)     ** No results found for the last 168 hours. **            Diagnostic Tests: n/a        Assessment:       56 y/o male recently discharged from [facility] due to  toe Osteo admitted with:     - MAURICE on top of CKD-3b  - Hypervolemic - HypoNa+  - Acute resp. Failure with hypoxia  - Anasarca et?  - Severe proteinuria r/o Nephrotic syndrome  - Mild NAGMA  - COVID-19 infection (+ test 7/16)  - L-5th. MT ulcer  - Anemia et?  - HTN  - Uncontrolled DM-2  - Hypoalbuminemia r/o Nephrotic syndrome  - Abnormal troponin et?           Plan:    - 24 hour urine protein  - Renal ADA diet with fluid restriction  - Judicious diuresis  - Renal ADA diet  - f/u BMP  - NO ACEI or ARBs   - Respiratory Isolation  - Podiatry following  - Wound care  - Antibiotics and COVID-19 management per admitting  - Glycemic control and other problems per admitting

## 2020-08-06 LAB
ANION GAP SERPL CALC-SCNC: 7 MMOL/L (ref 8–16)
BUN SERPL-MCNC: 55 MG/DL (ref 6–20)
CALCIUM SERPL-MCNC: 8.4 MG/DL (ref 8.7–10.5)
CHLORIDE SERPL-SCNC: 100 MMOL/L (ref 95–110)
CO2 SERPL-SCNC: 26 MMOL/L (ref 23–29)
CREAT SERPL-MCNC: 3.8 MG/DL (ref 0.5–1.4)
EST. GFR  (AFRICAN AMERICAN): 19 ML/MIN/1.73 M^2
EST. GFR  (NON AFRICAN AMERICAN): 17 ML/MIN/1.73 M^2
GLUCOSE SERPL-MCNC: 118 MG/DL (ref 70–110)
OSMOLALITY UR: 296 MOSM/KG (ref 50–1200)
POCT GLUCOSE: 113 MG/DL (ref 70–110)
POCT GLUCOSE: 115 MG/DL (ref 70–110)
POCT GLUCOSE: 129 MG/DL (ref 70–110)
POCT GLUCOSE: 153 MG/DL (ref 70–110)
POCT GLUCOSE: 47 MG/DL (ref 70–110)
POTASSIUM SERPL-SCNC: 4.1 MMOL/L (ref 3.5–5.1)
PROT 24H UR-MRATE: 4490 MG/SPEC (ref 0–100)
PROT UR-MCNC: 73 MG/DL (ref 0–15)
SODIUM SERPL-SCNC: 133 MMOL/L (ref 136–145)
URINE COLLECTION DURATION: 24 HR
URINE VOLUME: 6150 ML

## 2020-08-06 PROCEDURE — 11000001 HC ACUTE MED/SURG PRIVATE ROOM

## 2020-08-06 PROCEDURE — 94761 N-INVAS EAR/PLS OXIMETRY MLT: CPT

## 2020-08-06 PROCEDURE — 25000003 PHARM REV CODE 250: Performed by: INTERNAL MEDICINE

## 2020-08-06 PROCEDURE — 25000003 PHARM REV CODE 250: Performed by: HOSPITALIST

## 2020-08-06 PROCEDURE — 27000221 HC OXYGEN, UP TO 24 HOURS

## 2020-08-06 PROCEDURE — 25000003 PHARM REV CODE 250: Performed by: EMERGENCY MEDICINE

## 2020-08-06 PROCEDURE — 63600175 PHARM REV CODE 636 W HCPCS: Performed by: INTERNAL MEDICINE

## 2020-08-06 PROCEDURE — 80048 BASIC METABOLIC PNL TOTAL CA: CPT

## 2020-08-06 PROCEDURE — 84156 ASSAY OF PROTEIN URINE: CPT

## 2020-08-06 PROCEDURE — 63600175 PHARM REV CODE 636 W HCPCS: Performed by: EMERGENCY MEDICINE

## 2020-08-06 PROCEDURE — 63600175 PHARM REV CODE 636 W HCPCS: Performed by: HOSPITALIST

## 2020-08-06 PROCEDURE — 97802 MEDICAL NUTRITION INDIV IN: CPT

## 2020-08-06 RX ORDER — ERGOCALCIFEROL 1.25 MG/1
50000 CAPSULE ORAL
Status: DISCONTINUED | OUTPATIENT
Start: 2020-08-06 | End: 2020-08-07 | Stop reason: HOSPADM

## 2020-08-06 RX ADMIN — HEPARIN SODIUM 5000 UNITS: 5000 INJECTION INTRAVENOUS; SUBCUTANEOUS at 10:08

## 2020-08-06 RX ADMIN — HYDRALAZINE HYDROCHLORIDE 100 MG: 25 TABLET, FILM COATED ORAL at 09:08

## 2020-08-06 RX ADMIN — HEPARIN SODIUM 5000 UNITS: 5000 INJECTION INTRAVENOUS; SUBCUTANEOUS at 05:08

## 2020-08-06 RX ADMIN — AMLODIPINE BESYLATE 10 MG: 5 TABLET ORAL at 09:08

## 2020-08-06 RX ADMIN — PIPERACILLIN SODIUM, TAZOBACTAM SODIUM 4.5 G: 4; .5 INJECTION, POWDER, LYOPHILIZED, FOR SOLUTION INTRAVENOUS at 01:08

## 2020-08-06 RX ADMIN — ACETAMINOPHEN 650 MG: 325 TABLET ORAL at 05:08

## 2020-08-06 RX ADMIN — Medication: at 09:08

## 2020-08-06 RX ADMIN — ACETAMINOPHEN 650 MG: 325 TABLET ORAL at 02:08

## 2020-08-06 RX ADMIN — ERGOCALCIFEROL 50000 UNITS: 1.25 CAPSULE, LIQUID FILLED ORAL at 02:08

## 2020-08-06 RX ADMIN — FUROSEMIDE 40 MG: 10 INJECTION, SOLUTION INTRAMUSCULAR; INTRAVENOUS at 09:08

## 2020-08-06 RX ADMIN — HYDRALAZINE HYDROCHLORIDE 100 MG: 25 TABLET, FILM COATED ORAL at 05:08

## 2020-08-06 RX ADMIN — ACETAMINOPHEN 650 MG: 325 TABLET ORAL at 10:08

## 2020-08-06 RX ADMIN — PIPERACILLIN SODIUM, TAZOBACTAM SODIUM 4.5 G: 4; .5 INJECTION, POWDER, LYOPHILIZED, FOR SOLUTION INTRAVENOUS at 02:08

## 2020-08-06 RX ADMIN — HEPARIN SODIUM 5000 UNITS: 5000 INJECTION INTRAVENOUS; SUBCUTANEOUS at 02:08

## 2020-08-06 RX ADMIN — ASPIRIN 325 MG: 325 TABLET, DELAYED RELEASE ORAL at 09:08

## 2020-08-06 RX ADMIN — HYDRALAZINE HYDROCHLORIDE 100 MG: 25 TABLET, FILM COATED ORAL at 02:08

## 2020-08-06 NOTE — PLAN OF CARE
Recommendations  1. Add Reuben BID x 14 days.   2. Add Vitamin C, Zinc & MVI.  Goals: Pt to consume > 75% of meals by RD follow up.  Nutrition Goal Status: new

## 2020-08-06 NOTE — PLAN OF CARE
Patient remains free from injury and falls. 24 hour urine in progress. Weaned to 1 liter this shift. IV antibiotic administered as ordered. Able to make needs  known. Plan of care continued.

## 2020-08-06 NOTE — ASSESSMENT & PLAN NOTE
Baseline CKD stage 3, cautious diuresis with IV Lasix, nephrology consult, appreciate recs. strict I/O's, monitor renal function and electrolytes, avoid nephrotoxic agents.   CRT is still not back to baseline,stable on 2 liter Nc O 2.24 hours urine in progress for proteinuria

## 2020-08-06 NOTE — PLAN OF CARE
Problem: Fall Injury Risk  Goal: Absence of Fall and Fall-Related Injury  Outcome: Ongoing, Not Progressing     Problem: Adult Inpatient Plan of Care  Goal: Plan of Care Review  Outcome: Ongoing, Not Progressing  Goal: Patient-Specific Goal (Individualization)  Outcome: Ongoing, Not Progressing  Goal: Absence of Hospital-Acquired Illness or Injury  Outcome: Ongoing, Not Progressing  Goal: Optimal Comfort and Wellbeing  Outcome: Ongoing, Not Progressing  Goal: Readiness for Transition of Care  Outcome: Ongoing, Not Progressing  Goal: Rounds/Family Conference  Outcome: Ongoing, Not Progressing     Problem: Diabetes Comorbidity  Goal: Blood Glucose Level Within Desired Range  Outcome: Ongoing, Not Progressing     Problem: Electrolyte Imbalance (Acute Kidney Injury/Impairment)  Goal: Serum Electrolyte Balance  Outcome: Ongoing, Not Progressing     Problem: Fluid Imbalance (Acute Kidney Injury/Impairment)  Goal: Optimal Fluid Balance  Outcome: Ongoing, Not Progressing     Problem: Hematologic Alteration (Acute Kidney Injury/Impairment)  Goal: Hemoglobin, Hematocrit and Platelets Within Normal Range  Outcome: Ongoing, Not Progressing     Problem: Oral Intake Inadequate (Acute Kidney Injury/Impairment)  Goal: Optimal Nutrition Intake  Outcome: Ongoing, Not Progressing     Problem: Renal Function Impairment (Acute Kidney Injury/Impairment)  Goal: Effective Renal Function  Outcome: Ongoing, Not Progressing     Problem: Infection  Goal: Infection Symptom Resolution  Outcome: Ongoing, Not Progressing   Wound care to left foot completed. Slough noted to open area, no complaints of pain.  Proten specimen sent to lab

## 2020-08-06 NOTE — PROGRESS NOTES
"[facility]  Adult Nutrition  Progress Note    SUMMARY       Recommendations  1. Add Reuben BID x 14 days.   2. Add Vitamin C, Zinc & MVI.  Goals: Pt to consume > 75% of meals by RD follow up.  Nutrition Goal Status: new  Communication of RD Recs: (plan of care)    Reason for Assessment  Reason For Assessment: identified at risk by screening criteria(wound)  Diagnosis: (acute congestive heart failure)  Relevant Medical History: CKD, DM2, diabetic foot ulcer, edema, HLD, HTN, obese  General Information Comments: Pt admitted to [facility] with SOB, wheezing and leg swelling. COVID + on 7/16. Chart shows 17% wt gain of 50lb in 1 week. Pt consuming 100% of meals. MD reports increase in edema since start of abx at admit. On last admit pt was treated for osteomyelitis. NFPE unable to be completed 2/2 COVID precautions. BMI 39.56.  Nutrition Discharge Planning: Adequate PO intake via DM/Renal diet.    Nutrition Risk Screen  Nutrition Risk Screen: large or nonhealing wound, burn or pressure injury    Anthropometrics  Temp: 99 °F (37.2 °C)  Height Method: Stated  Height: 6' 1" (185.4 cm)  Height (inches): 73 in  Weight Method: Bed Scale  Weight: 136 kg (299 lb 13.2 oz)  Weight (lb): 299.83 lb  Ideal Body Weight (IBW), Male: 184 lb  % Ideal Body Weight, Male (lb): 162.95 %  BMI (Calculated): 39.6  BMI Grade: 35 - 39.9 - obesity - grade II     Lab/Procedures/Meds  Pertinent Labs Reviewed: reviewed  Pertinent Labs Comments: Na 133, Crea 3.8, GFR 19, Glu 118, Pavan 8.4 A1C 8.1 (7/17)  Pertinent Medications Reviewed: reviewed  Pertinent Medications Comments: Furosemide, heparin    Physical Findings/Assessment  Mian score 20     Estimated/Assessed Needs  Weight Used For Calorie Calculations: 136 kg (299 lb 13.2 oz)  Energy Calorie Requirements (kcal): 2243 kcal daily  Energy Need Method: [place]-St Jeor(x 1.0 for obesity)  Protein Requirements: 461-204 gm daily  Weight Used For Protein Calculations: 136 kg (299 lb " 13.2 oz)(1.2-1.5 gm/kg)  Fluid Requirements (mL): 1 mL/kcal or per MD  Estimated Fluid Requirement Method: RDA Method  RDA Method (mL): 2243  CHO Requirement: 280 gm daily    Nutrition Prescription Ordered  Current Diet Order: DM-2000/Renal    Evaluation of Received Nutrient/Fluid Intake  % Intake of Estimated Energy Needs: 75 - 100 %  % Meal Intake: 75 - 100 %    Nutrition Risk  Level of Risk/Frequency of Follow-up: low - moderate(f/u 1x/weekly)     Assessment and Plan  Nutrition Problem  Increased nutrient needs, protein    Related to (etiology):   Wound healing    Signs and Symptoms (as evidenced by):   Diabetic foot wound    Interventions(treatment strategy):  Collaboration with other providers    Nutrition Diagnosis Status:   New    Monitor and Evaluation  Food and Nutrient Intake: energy intake  Food and Nutrient Adminstration: diet order  Knowledge/Beliefs/Attitudes: food and nutrition knowledge/skill  Physical Activity and Function: nutrition-related ADLs and IADLs  Anthropometric Measurements: weight change, weight  Biochemical Data, Medical Tests and Procedures: electrolyte and renal panel, gastrointestinal profile, glucose/endocrine profile, inflammatory profile, lipid profile  Nutrition-Focused Physical Findings: overall appearance     Malnutrition Assessment  Malnutrition Type: chronic illness  Skin (Micronutrient): wounds unhealed  Musculoskeletal/Lower Extremities: edema   Edema (Fluid Accumulation): 2-->mild     Nutrition Follow-Up  RD Follow-up?: Yes

## 2020-08-06 NOTE — PLAN OF CARE
Patient will resume care with [facility] when patient is discharged.        08/06/20 1313   Post-Acute Status   Post-Acute Authorization Placement   Post-Acute Placement Status Awaiting Internal Medical Clearance   Home Health Status Awaiting Internal Medical Clearance   Discharge Delays None known at this time   Discharge Plan   Discharge Plan A [facility]

## 2020-08-06 NOTE — PROGRESS NOTES
Renal Progress Note    Date of Admission:  8/3/2020  1:51 PM    Length of Stay: 3  Days    Subjective: n/a    Objective:    Current Facility-Administered Medications   Medication    acetaminophen tablet 650 mg    amLODIPine tablet 10 mg    aspirin EC tablet 325 mg    cadexomer iodine 0.9 % gel    cloNIDine tablet 0.2 mg    dextrose 50% injection 12.5 g    dextrose 50% injection 25 g    furosemide injection 40 mg    glucagon (human recombinant) injection 1 mg    glucose chewable tablet 16 g    glucose chewable tablet 24 g    heparin (porcine) injection 5,000 Units    hydrALAZINE tablet 100 mg    insulin aspart U-100 pen 0-5 Units    piperacillin-tazobactam 4.5 g in sodium chloride 0.9% 100 mL IVPB (ready to mix system)       Vitals:    08/05/20 2017 08/06/20 0026 08/06/20 0600 08/06/20 0740   BP: (!) 154/79 (!) 143/79 (!) 157/85 (!) 173/84   BP Location: Left arm Left arm Left arm Left arm   Patient Position: Lying Lying Lying Lying   Pulse: 85 87 89 91   Resp: 20 19 20 19   Temp: 98.8 °F (37.1 °C) 98.9 °F (37.2 °C) 99.9 °F (37.7 °C) 99 °F (37.2 °C)   TempSrc: Oral Oral Oral Oral   SpO2: 96% (!) 94% (!) 94% (!) 89%   Weight:       Height:           I/O last 3 completed shifts:  In: 340 [P.O.:240; IV Piggyback:100]  Out: 5200 [Urine:5200]  No intake/output data recorded.      Physical Exam: Deferred / covid-19 + on isolation    General: NAD  Neck: supple  Heart: RRR  Lungs: unlabored breathing  Abdomen: n/a  Limbs: n/a  Neurologic: AAO x 3      Laboratories:    No results for input(s): WBC, RBC, HGB, HCT, PLT, MCV, MCH, MCHC in the last 24 hours.    Recent Labs   Lab 08/06/20  0511   CALCIUM 8.4*   *   K 4.1   CO2 26      BUN 55*   CREATININE 3.8*       No results for input(s): COLORU, CLARITYU, SPECGRAV, PHUR, PROTEINUA, GLUCOSEU, BLOODU, WBCU, RBCU, BACTERIA, MUCUS in the last 24 hours.    Invalid input(s):  BILIRUBINCON    Microbiology Results (last 7 days)     **  No results found for the last 168 hours. **            Diagnostic Tests: n/a        Assessment:       54 y/o male recently discharged from [facility] due to toe Osteo admitted with:     - MAURICE on top of CKD-3b (creatinine about same)  - Hypervolemic - HypoNa+  - Acute resp. Failure with hypoxia  - Anasarca et?  - Severe proteinuria r/o Nephrotic syndrome  - Mild NAGMA  RESOLVED  - COVID-19 infection (+ test 7/16)  - L-5th. MT ulcer  - Anemia et?  - HTN  - Uncontrolled DM-2  - Hypoalbuminemia r/o Nephrotic syndrome  - Abnormal troponin et?  - Vit D def.           Plan:    - 24 hour urine protein in progress  - Renal ADA diet with fluid restriction  - Judicious diuresis  - Renal ADA diet  - f/u BMP  - NO ACEI or ARBs   - Oral vit D  - Respiratory Isolation  - Podiatry following  - Wound care  - Antibiotics and COVID-19 management per admitting  - Glycemic control and other problems per admitting

## 2020-08-06 NOTE — PROGRESS NOTES
[facility] Hospital Medicine  Progress Note    Patient Name: Catalino Mcfadden  MRN: [id number]  Patient Class: IP- Inpatient   Admission Date: 8/3/2020  Length of Stay: 3 days  Attending Physician: Laura Crowder MD  Primary Care Provider: Azikiwe K Lombard, MD        Subjective:     Principal Problem:Acute respiratory failure with hypoxia        HPI:  55 y.o. male with hypertension, hyperlipidemia, dm 2, CKD stage 3, osteomyelitis of left foot on home IV antibiotics via PICC line, and recent diagnosis of COVID-19 infection presents with a complaint of shortness of breath.  Acute onset yesterday, associated with extensive edema to the bilateral lower extremities up to the level of the abdomen and arms with abdominal distension.  SOB exacerbated with exertion.  Denies fever, chills, cough, chest pain, palpitations, dizziness, syncope, nausea, vomiting, diarrhea, abdominal pain, bloody black stool, or dysuria.  In the ED, chest x-ray reveals likely pulmonary edema; however, changes secondary to COVID-19 infection not excluded.  Labs show acute kidney injury creatinine 4.0, increased above baseline.  He was found to be mildly hypoxic on room air requiring supplemental oxygen.  Troponin also elevated at 0.118.  ASA, full-dose Lovenox, and IV Lasix initiated.  Admitted to hospital medicine for further evaluation and treatment.    Overview/Hospital Course:  55 y.o. male with hypertension, hyperlipidemia, dm 2, CKD stage 3, osteomyelitis of left foot on home IV antibiotics via PICC line, and recent diagnosis of COVID-19 infection presents with a complaint of shortness of breath.  Acute onset day before admission,, associated with extensive edema to the bilateral lower extremities up to the level of the abdomen and arms with abdominal distension.  SOB exacerbated with exertion.  Denies fever, chills, cough, chest pain, palpitations, dizziness, syncope, nausea, vomiting, diarrhea, abdominal pain, bloody  black stool, or dysuria.  In the ED, chest x-ray reveals likely pulmonary edema; however, changes secondary to COVID-19 infection not excluded.  Labs show acute kidney injury creatinine 4.0, increased above baseline.  He was found to be mildly hypoxic on room air requiring supplemental oxygen.  Troponin also elevated at 0.118.  ASA, full-dose Lovenox, and IV Lasix initiated.  Admitted to hospital medicine for further evaluation and treatment.echo show preserved EF,will hold lasix at this time,he is stabe on 2 liter NC O 2,nephroplogy is consulted for worsening CKD.  CRT is still not back to baseline,stable on 2 liter Nc O 2.24 hours urine in progress for proteinuria.    Past Medical History:   Diagnosis Date    CKD (chronic kidney disease), stage III 07/16/2020    CKD stage 3 due to type 1 diabetes mellitus     CKD stage 3 due to type 2 diabetes mellitus     Diabetes mellitus, type 2     Diabetic foot ulcer associated with diabetes mellitus due to underlying condition 07/16/2020    Diabetic foot ulcers     Edema 08/03/2020    generalized, including genital area    Hyperlipidemia     Hypertension     Obese        Past Surgical History:   Procedure Laterality Date    BONE BIOPSY Left 7/17/2020    Procedure: BIOPSY, BONE;  Surgeon: Verona Whitmore DPM;  Location: [facility] OR;  Service: Podiatry;  Laterality: Left;    CERVICAL DISC SURGERY  07/11/2016    DEBRIDEMENT Left 7/17/2020    Procedure: DEBRIDEMENT;  Surgeon: Verona Whitmore DPM;  Location: [facility] OR;  Service: Podiatry;  Laterality: Left;    DEBRIDEMENT OF FOOT Right     toes & plantar surface    FRACTURE SURGERY Right     medial ankle, metal plate present    left leg orthopedic surgery Left        Review of patient's allergies indicates:  No Known Allergies    No current facility-administered medications on file prior to encounter.      Current Outpatient Medications on File Prior to Encounter   Medication Sig    amLODIPine (NORVASC) 10 MG tablet  "Take 1 tablet (10 mg total) by mouth once daily.    carvediloL (COREG) 25 MG tablet Take 1 tablet (25 mg total) by mouth 2 (two) times daily.    gabapentin (NEURONTIN) 300 MG capsule Take 1 capsule (300 mg total) by mouth 2 (two) times daily.    hydrALAZINE (APRESOLINE) 50 MG tablet Take 1 tablet (50 mg total) by mouth every 8 (eight) hours.    insulin detemir U-100 (LEVEMIR FLEXTOUCH) 100 unit/mL (3 mL) SubQ InPn pen Inject 20 Units into the skin 2 (two) times daily.    atorvastatin (LIPITOR) 40 MG tablet Take 1 tablet (40 mg total) by mouth once daily.    blood sugar diagnostic Strp 1 strip by Misc.(Non-Drug; Combo Route) route 3 (three) times daily. Patient needs One Touch Test Strips (Berio).    insulin aspart U-100 (NOVOLOG) 100 unit/mL (3 mL) InPn pen Inject 10 Units into the skin 3 (three) times daily with meals. (Patient not taking: Reported on 8/3/2020)    pen needle, diabetic 32 gauge x 3/16" Ndle 1 each by Misc.(Non-Drug; Combo Route) route 5 (five) times daily.    TRULICITY 0.75 mg/0.5 mL PnIj INJECT 0.5 ML UNDER THE SKIN EVERY 7 DAYS     Family History     Problem Relation (Age of Onset)    Heart disease Father        Tobacco Use    Smoking status: Never Smoker    Smokeless tobacco: Never Used   Substance and Sexual Activity    Alcohol use: Not Currently     Alcohol/week: 0.0 standard drinks     Comment: social    Drug use: No    Sexual activity: Not on file     Review of Systems   Constitutional: Negative for chills and fever.   Eyes: Negative for photophobia and visual disturbance.   Respiratory: Negative for cough, shortness of breath and wheezing.    Cardiovascular: Positive for leg swelling. Negative for chest pain and palpitations.   Gastrointestinal: Positive for abdominal distention. Negative for abdominal pain, diarrhea, nausea and vomiting.   Genitourinary: Positive for scrotal swelling. Negative for frequency, hematuria and urgency.   Skin: Negative for pallor, rash and wound. "   Neurological: Negative for light-headedness and headaches.   Psychiatric/Behavioral: Negative for confusion and decreased concentration.     Objective:     Vital Signs (Most Recent):  Temp: 99 °F (37.2 °C) (08/06/20 0740)  Pulse: 91 (08/06/20 0740)  Resp: 19 (08/06/20 0740)  BP: (!) 173/84 (08/06/20 0740)  SpO2: 95 % (08/06/20 0850) Vital Signs (24h Range):  Temp:  [98.2 °F (36.8 °C)-99.9 °F (37.7 °C)] 99 °F (37.2 °C)  Pulse:  [82-91] 91  Resp:  [18-20] 19  SpO2:  [89 %-96 %] 95 %  BP: (143-177)/(78-85) 173/84     Weight: 136 kg (299 lb 13.2 oz)  Body mass index is 39.56 kg/m².    Physical Exam  Vitals signs and nursing note reviewed.   Constitutional:       General: He is not in acute distress.     Appearance: He is well-developed.   HENT:      Head: Normocephalic and atraumatic.      Right Ear: External ear normal.      Left Ear: External ear normal.      Nose: Nose normal.   Eyes:      Conjunctiva/sclera: Conjunctivae normal.      Pupils: Pupils are equal, round, and reactive to light.   Neck:      Musculoskeletal: Normal range of motion and neck supple.   Cardiovascular:      Rate and Rhythm: Normal rate and regular rhythm.   Pulmonary:      Effort: Tachypnea and accessory muscle usage present. No respiratory distress.      Breath sounds: Decreased breath sounds present. No wheezing or rales.   Abdominal:      General: Abdomen is protuberant. Bowel sounds are normal. There is no distension.      Palpations: Abdomen is soft.      Tenderness: There is no abdominal tenderness.      Comments: No palpable hepatomegaly or splenomegaly   Musculoskeletal: Normal range of motion.         General: No tenderness.      Right lower leg: Edema present.      Left lower leg: Edema present.   Skin:     General: Skin is warm and dry.   Neurological:      Mental Status: He is alert and oriented to person, place, and time.   Psychiatric:         Thought Content: Thought content normal.           CRANIAL NERVES     CN III, IV, VI    Pupils are equal, round, and reactive to light.       Significant Labs: All pertinent labs within the past 24 hours have been reviewed.    Significant Imaging: I have reviewed all pertinent imaging results/findings within the past 24 hours.      Assessment/Plan:      * Acute respiratory failure with hypoxia  Likely secondary to acute pulmonary edema, continue supplemental oxygen, diuresis with IV Lasix, and afterload reduction as tolerated, echo show preserved EF,, I&Os, daily weights.  stable on 2 liter Nc O 2.    Elevated troponin  NSTEMI or demand from hypoxia in setting of impaired renal function, continue ASA , hold BBl secondary to possible acute heart failure, hold ACEi/ARB due to renal function.  Monitor on tele, obtain serial markers.echo show preserved EF.      Osteomyelitis of left foot  Stable,continue home IV antibiotics with zosyn,alluigi has picc line,untill 9.1.20,zac follow up with podiatry as out patient,seen by podiatry.    Acute kidney injury superimposed on chronic kidney disease  Baseline CKD stage 3, cautious diuresis with IV Lasix, nephrology consult, appreciate recs. strict I/O's, monitor renal function and electrolytes, avoid nephrotoxic agents.   CRT is still not back to baseline,stable on 2 liter Nc O 2.24 hours urine in progress for proteinuria    Hyponatremia  Improved, mild, asymptomatic, hypervolemic.  Continue cautious diuresis with IV Lasix.    COVID-19 virus infection  Positive test 07/16/2020, symptoms had resolved shortly thereafter. , but no fever or cough.  Continue isolation for now.    Hyperlipidemia, acquired  Continue statin.    Essential hypertension  Poorly controlled, hold BBl due to possible heart failure, continue amlodipine and monitor blood pressure, adjust as needed.     Uncontrolled type 2 diabetes mellitus with stage 3 chronic kidney disease, without long-term current use of insulin  Last HgbA1c   Lab Results   Component Value Date    HGBA1C 8.1 (H) 07/17/2020      Hold oral antihyperglycemics while inpatient  PRN sliding scale insulin  ACHS glucose monitoring   ADA diet       VTE Risk Mitigation (From admission, onward)         Ordered     heparin (porcine) injection 5,000 Units  Every 8 hours      08/04/20 7197                      Laura Crowder MD  Department of Hospital Medicine   [facility]

## 2020-08-06 NOTE — SUBJECTIVE & OBJECTIVE
Past Medical History:   Diagnosis Date    CKD (chronic kidney disease), stage III 07/16/2020    CKD stage 3 due to type 1 diabetes mellitus     CKD stage 3 due to type 2 diabetes mellitus     Diabetes mellitus, type 2     Diabetic foot ulcer associated with diabetes mellitus due to underlying condition 07/16/2020    Diabetic foot ulcers     Edema 08/03/2020    generalized, including genital area    Hyperlipidemia     Hypertension     Obese        Past Surgical History:   Procedure Laterality Date    BONE BIOPSY Left 7/17/2020    Procedure: BIOPSY, BONE;  Surgeon: Verona Whitmore DPM;  Location: [facility] OR;  Service: Podiatry;  Laterality: Left;    CERVICAL DISC SURGERY  07/11/2016    DEBRIDEMENT Left 7/17/2020    Procedure: DEBRIDEMENT;  Surgeon: Verona Whitmore DPM;  Location: [facility] OR;  Service: Podiatry;  Laterality: Left;    DEBRIDEMENT OF FOOT Right     toes & plantar surface    FRACTURE SURGERY Right     medial ankle, metal plate present    left leg orthopedic surgery Left        Review of patient's allergies indicates:  No Known Allergies    No current facility-administered medications on file prior to encounter.      Current Outpatient Medications on File Prior to Encounter   Medication Sig    amLODIPine (NORVASC) 10 MG tablet Take 1 tablet (10 mg total) by mouth once daily.    carvediloL (COREG) 25 MG tablet Take 1 tablet (25 mg total) by mouth 2 (two) times daily.    gabapentin (NEURONTIN) 300 MG capsule Take 1 capsule (300 mg total) by mouth 2 (two) times daily.    hydrALAZINE (APRESOLINE) 50 MG tablet Take 1 tablet (50 mg total) by mouth every 8 (eight) hours.    insulin detemir U-100 (LEVEMIR FLEXTOUCH) 100 unit/mL (3 mL) SubQ InPn pen Inject 20 Units into the skin 2 (two) times daily.    atorvastatin (LIPITOR) 40 MG tablet Take 1 tablet (40 mg total) by mouth once daily.    blood sugar diagnostic Strp 1 strip by Misc.(Non-Drug; Combo Route) route 3 (three) times daily. Patient  "needs One Touch Test Strips (Berio).    insulin aspart U-100 (NOVOLOG) 100 unit/mL (3 mL) InPn pen Inject 10 Units into the skin 3 (three) times daily with meals. (Patient not taking: Reported on 8/3/2020)    pen needle, diabetic 32 gauge x 3/16" Ndle 1 each by Misc.(Non-Drug; Combo Route) route 5 (five) times daily.    TRULICITY 0.75 mg/0.5 mL PnIj INJECT 0.5 ML UNDER THE SKIN EVERY 7 DAYS     Family History     Problem Relation (Age of Onset)    Heart disease Father        Tobacco Use    Smoking status: Never Smoker    Smokeless tobacco: Never Used   Substance and Sexual Activity    Alcohol use: Not Currently     Alcohol/week: 0.0 standard drinks     Comment: social    Drug use: No    Sexual activity: Not on file     Review of Systems   Constitutional: Negative for chills and fever.   Eyes: Negative for photophobia and visual disturbance.   Respiratory: Negative for cough, shortness of breath and wheezing.    Cardiovascular: Positive for leg swelling. Negative for chest pain and palpitations.   Gastrointestinal: Positive for abdominal distention. Negative for abdominal pain, diarrhea, nausea and vomiting.   Genitourinary: Positive for scrotal swelling. Negative for frequency, hematuria and urgency.   Skin: Negative for pallor, rash and wound.   Neurological: Negative for light-headedness and headaches.   Psychiatric/Behavioral: Negative for confusion and decreased concentration.     Objective:     Vital Signs (Most Recent):  Temp: 99 °F (37.2 °C) (08/06/20 0740)  Pulse: 91 (08/06/20 0740)  Resp: 19 (08/06/20 0740)  BP: (!) 173/84 (08/06/20 0740)  SpO2: 95 % (08/06/20 0850) Vital Signs (24h Range):  Temp:  [98.2 °F (36.8 °C)-99.9 °F (37.7 °C)] 99 °F (37.2 °C)  Pulse:  [82-91] 91  Resp:  [18-20] 19  SpO2:  [89 %-96 %] 95 %  BP: (143-177)/(78-85) 173/84     Weight: 136 kg (299 lb 13.2 oz)  Body mass index is 39.56 kg/m².    Physical Exam  Vitals signs and nursing note reviewed.   Constitutional:       " General: He is not in acute distress.     Appearance: He is well-developed.   HENT:      Head: Normocephalic and atraumatic.      Right Ear: External ear normal.      Left Ear: External ear normal.      Nose: Nose normal.   Eyes:      Conjunctiva/sclera: Conjunctivae normal.      Pupils: Pupils are equal, round, and reactive to light.   Neck:      Musculoskeletal: Normal range of motion and neck supple.   Cardiovascular:      Rate and Rhythm: Normal rate and regular rhythm.   Pulmonary:      Effort: Tachypnea and accessory muscle usage present. No respiratory distress.      Breath sounds: Decreased breath sounds present. No wheezing or rales.   Abdominal:      General: Abdomen is protuberant. Bowel sounds are normal. There is no distension.      Palpations: Abdomen is soft.      Tenderness: There is no abdominal tenderness.      Comments: No palpable hepatomegaly or splenomegaly   Musculoskeletal: Normal range of motion.         General: No tenderness.      Right lower leg: Edema present.      Left lower leg: Edema present.   Skin:     General: Skin is warm and dry.   Neurological:      Mental Status: He is alert and oriented to person, place, and time.   Psychiatric:         Thought Content: Thought content normal.           CRANIAL NERVES     CN III, IV, VI   Pupils are equal, round, and reactive to light.       Significant Labs: All pertinent labs within the past 24 hours have been reviewed.    Significant Imaging: I have reviewed all pertinent imaging results/findings within the past 24 hours.

## 2020-08-06 NOTE — CONSULTS
Patient seen by Podiatry, Dr. Martinez, 8/4 and treatment plan developed. Nursing performing dressing changes to left foot daily with Iodosorb.

## 2020-08-07 VITALS
HEART RATE: 86 BPM | TEMPERATURE: 98 F | OXYGEN SATURATION: 94 % | BODY MASS INDEX: 39.73 KG/M2 | WEIGHT: 299.81 LBS | DIASTOLIC BLOOD PRESSURE: 76 MMHG | RESPIRATION RATE: 19 BRPM | SYSTOLIC BLOOD PRESSURE: 150 MMHG | HEIGHT: 73 IN

## 2020-08-07 LAB
ANION GAP SERPL CALC-SCNC: 8 MMOL/L (ref 8–16)
BUN SERPL-MCNC: 55 MG/DL (ref 6–20)
CALCIUM SERPL-MCNC: 8.2 MG/DL (ref 8.7–10.5)
CHLORIDE SERPL-SCNC: 99 MMOL/L (ref 95–110)
CO2 SERPL-SCNC: 28 MMOL/L (ref 23–29)
CREAT SERPL-MCNC: 3.7 MG/DL (ref 0.5–1.4)
EST. GFR  (AFRICAN AMERICAN): 20 ML/MIN/1.73 M^2
EST. GFR  (NON AFRICAN AMERICAN): 17 ML/MIN/1.73 M^2
GLUCOSE SERPL-MCNC: 104 MG/DL (ref 70–110)
POCT GLUCOSE: 111 MG/DL (ref 70–110)
POCT GLUCOSE: 115 MG/DL (ref 70–110)
POTASSIUM SERPL-SCNC: 3.8 MMOL/L (ref 3.5–5.1)
SODIUM SERPL-SCNC: 135 MMOL/L (ref 136–145)

## 2020-08-07 PROCEDURE — 99232 SBSQ HOSP IP/OBS MODERATE 35: CPT | Mod: ,,, | Performed by: PODIATRIST

## 2020-08-07 PROCEDURE — 25000003 PHARM REV CODE 250: Performed by: HOSPITALIST

## 2020-08-07 PROCEDURE — 80048 BASIC METABOLIC PNL TOTAL CA: CPT

## 2020-08-07 PROCEDURE — 63600175 PHARM REV CODE 636 W HCPCS: Performed by: INTERNAL MEDICINE

## 2020-08-07 PROCEDURE — 25000003 PHARM REV CODE 250: Performed by: EMERGENCY MEDICINE

## 2020-08-07 PROCEDURE — 63600175 PHARM REV CODE 636 W HCPCS: Performed by: EMERGENCY MEDICINE

## 2020-08-07 PROCEDURE — 63600175 PHARM REV CODE 636 W HCPCS: Performed by: HOSPITALIST

## 2020-08-07 PROCEDURE — 99232 PR SUBSEQUENT HOSPITAL CARE,LEVL II: ICD-10-PCS | Mod: ,,, | Performed by: PODIATRIST

## 2020-08-07 RX ORDER — INSULIN ASPART 100 [IU]/ML
5 INJECTION, SOLUTION INTRAVENOUS; SUBCUTANEOUS
Qty: 13.5 ML | Refills: 3
Start: 2020-08-07 | End: 2021-06-10

## 2020-08-07 RX ORDER — HYDRALAZINE HYDROCHLORIDE 100 MG/1
100 TABLET, FILM COATED ORAL EVERY 8 HOURS
Qty: 90 TABLET | Refills: 11 | Status: SHIPPED | OUTPATIENT
Start: 2020-08-07 | End: 2020-12-07 | Stop reason: SDUPTHER

## 2020-08-07 RX ORDER — CARVEDILOL 12.5 MG/1
12.5 TABLET ORAL 2 TIMES DAILY
Status: DISCONTINUED | OUTPATIENT
Start: 2020-08-07 | End: 2020-08-07 | Stop reason: HOSPADM

## 2020-08-07 RX ORDER — FUROSEMIDE 40 MG/1
40 TABLET ORAL DAILY
Qty: 30 TABLET | Refills: 0 | Status: SHIPPED | OUTPATIENT
Start: 2020-08-07 | End: 2020-10-16 | Stop reason: SDUPTHER

## 2020-08-07 RX ADMIN — ACETAMINOPHEN 650 MG: 325 TABLET ORAL at 10:08

## 2020-08-07 RX ADMIN — HYDRALAZINE HYDROCHLORIDE 100 MG: 25 TABLET, FILM COATED ORAL at 03:08

## 2020-08-07 RX ADMIN — PIPERACILLIN SODIUM, TAZOBACTAM SODIUM 4.5 G: 4; .5 INJECTION, POWDER, LYOPHILIZED, FOR SOLUTION INTRAVENOUS at 12:08

## 2020-08-07 RX ADMIN — Medication: at 11:08

## 2020-08-07 RX ADMIN — PIPERACILLIN AND TAZOBACTAM 4.5 G: 4; .5 INJECTION, POWDER, LYOPHILIZED, FOR SOLUTION INTRAVENOUS; PARENTERAL at 09:08

## 2020-08-07 RX ADMIN — AMLODIPINE BESYLATE 10 MG: 5 TABLET ORAL at 09:08

## 2020-08-07 RX ADMIN — HEPARIN SODIUM 5000 UNITS: 5000 INJECTION INTRAVENOUS; SUBCUTANEOUS at 05:08

## 2020-08-07 RX ADMIN — CARVEDILOL 12.5 MG: 12.5 TABLET, FILM COATED ORAL at 09:08

## 2020-08-07 RX ADMIN — HYDRALAZINE HYDROCHLORIDE 100 MG: 25 TABLET, FILM COATED ORAL at 05:08

## 2020-08-07 RX ADMIN — ASPIRIN 325 MG: 325 TABLET, DELAYED RELEASE ORAL at 09:08

## 2020-08-07 RX ADMIN — FUROSEMIDE 40 MG: 10 INJECTION, SOLUTION INTRAMUSCULAR; INTRAVENOUS at 09:08

## 2020-08-07 NOTE — PLAN OF CARE
Patient's nurse Chandrika was informed that patient was cleared from a CM's standpoint. Patient will resume home healthcare with [facility] per Yanelis @ [phone] and [facility] per Sherry @ [phone].        08/07/20 1125   Final Note   Assessment Type Final Discharge Note   Anticipated Discharge Disposition Home-Health   What phone number can be called within the next 1-3 days to see how you are doing after discharge? [phone]   Hospital Follow Up  Appt(s) scheduled? Yes   Discharge plans and expectations educations in teach back method with documentation complete? Yes   Right Care Referral Info   Post Acute Recommendation Home-care   Facility Name [facility] [address] [place] [address]

## 2020-08-07 NOTE — PROGRESS NOTES
TN spoke with Sherry @ [facility] and home healthcare orders were faxed to [phone]. Sherry stated that patient will resume IV abx's infusion until 9/1/2020. Patient's spouse was given this information.

## 2020-08-07 NOTE — PHYSICIAN QUERY
"PT Name: Catalino Mcfadden  MR #: [id number]    Physician Query Form - Heart  Condition Clarification     CDS/: Dominique Weber RN CCDS            Contact information:[email or web address]  This form is a permanent document in the medical record.     Query Date: August 7, 2020    By submitting this query, we are merely seeking further clarification of documentation. Please utilize your independent clinical judgment when addressing the question(s) below.    The medical record contains the following   Indicators     Supporting Clinical Findings Location in Medical Record   x BNP JZS=800 Lab 8/3   x EF EF=60% Echo 8/3   x Radiology findings Central pulmonary vascular congestion associated with interstitial edema, likely representing CHF or pulmonary edema.  Superimposed infectious process not excluded. CXR 8/3   x Echo Results · Normal left ventricular systolic function. The estimated ejection fraction is 65%.  · Concentric left ventricular remodeling.  · Normal LV diastolic function.  · Normal right ventricular systolic function.  · Mild pulmonary hypertension present.  · Normal central venous pressure (3 mmHg).  · The estimated PA systolic pressure is 33 mmHg. Echo 8/3    "Ascites" documented      "SOB" or "NEAL" documented     x "Hypoxia" documented Acute respiratory failure with hypoxia   Likely secondary to acute pulmonary edema, continue supplemental oxygen, diuresis with IV Lasix, and afterload reduction as tolerated, echo show preserved EF,, I&Os, daily weights.   stable on 2 liter Nc O 2.    Hospital medicine PN 8/5   x Heart Failure documented Central pulmonary vascular congestion associated with interstitial edema, likely representing CHF or pulmonary edema. Since the previous examination there is significant central pulmonary vascular congestion with prominence of the interstitial markings, suspicious for CHF or pulmonary edema.   CN 8/4-8/6   x "Edema" documented presents with a complaint of shortness of " breath. Acute onset yesterday, associated with extensive edema to the bilateral lower extremities up to the level of the abdomen and arms with abdominal distension   H&P   x Diuretics/Meds Furosemide 60 mg IV 8/3  Furosemide 40 mg IV BID 8/4-8/8  Furosemide 40 mg PO daily as home med MAR    Treatment:      Other:      Heart failure (HF) can be acute, chronic or both. It is generally further specificed as systolic, diastolic, or combined. Lastly, it is important to identify an underlying etiology if known or suspected.     Common clues to acute exacerbation:  Rapidly progressive symptoms (w/in 2 weeks of presentation), using IV diuretics to treat, using supplemental O2, pulmonary edema on Xray, MI w/in 4 weeks, and/or BNP >500    Systolic Heart Failure: is defined as chart documentation of a left ventricular ejection fraction (LVEF) less than 40%     Diastolic Heart Failure: is defined as a left ventricular ejection fraction (LVEF) greater than 40%   +      Evidence of diastolic dysfunction on echocardiography OR    Right heart catheterization wedge pressure above 12 mm Hg OR    Left heart catheterization left ventricular end diastolic pressure 18 mm Hg or above.    References: *American Heart Association    The clinical guidelines noted below are only system guidelines, and do not replace the providers clinical judgment.    Please clarify the suspected acute CHF.  Thank you.  [   ] Acute Diastolic Heart Failure - New diagnosis.  EF > 40%  and acute HF symptoms documented   [   ] Other Type of Heart Failure (please specify type):   [x   ] Heart Failure Ruled Out   [   ] Other (please specify):   [  ] Clinically Undetermined                           Please document in your progress notes daily for the duration of treatment until resolved and include in your discharge summary.

## 2020-08-07 NOTE — PLAN OF CARE
Patient has been cleared per Sherry with [facility] @ [phone] to resume IV abx's every 12 hours and patient has also been cleared by Yanelis with [facility] @ [phone].       08/07/20 1100   Post-Acute Status   Post-Acute Authorization Home Health   Post-Acute Placement Status Set-up Complete   Home Health Status Set-up Complete   Discharge Delays None known at this time   Discharge Plan   Discharge Plan A Home Health

## 2020-08-07 NOTE — DISCHARGE SUMMARY
[facility] Hospital Medicine  Discharge Summary      Patient Name: Catalino Mcfadden  MRN: [id number]  Admission Date: 8/3/2020  Hospital Length of Stay: 4 days  Discharge Date and Time:  08/07/2020 10:38 AM  Attending Physician: Laura Crowder MD   Discharging Provider: Laura Crowder MD  Primary Care Provider: Azikiwe K Lombard, MD      HPI:   55 y.o. male with hypertension, hyperlipidemia, dm 2, CKD stage 3, osteomyelitis of left foot on home IV antibiotics via PICC line, and recent diagnosis of COVID-19 infection presents with a complaint of shortness of breath.  Acute onset yesterday, associated with extensive edema to the bilateral lower extremities up to the level of the abdomen and arms with abdominal distension.  SOB exacerbated with exertion.  Denies fever, chills, cough, chest pain, palpitations, dizziness, syncope, nausea, vomiting, diarrhea, abdominal pain, bloody black stool, or dysuria.  In the ED, chest x-ray reveals likely pulmonary edema; however, changes secondary to COVID-19 infection not excluded.  Labs show acute kidney injury creatinine 4.0, increased above baseline.  He was found to be mildly hypoxic on room air requiring supplemental oxygen.  Troponin also elevated at 0.118.  ASA, full-dose Lovenox, and IV Lasix initiated.  Admitted to hospital medicine for further evaluation and treatment.    * No surgery found *      Hospital Course:   55 y.o. male with hypertension, hyperlipidemia, dm 2, CKD stage 3, osteomyelitis of left foot on home IV antibiotics via PICC line, and recent diagnosis of COVID-19 infection presents with a complaint of shortness of breath.  Acute onset day before admission,, associated with extensive edema to the bilateral lower extremities up to the level of the abdomen and arms with abdominal distension.  SOB exacerbated with exertion.  Denies fever, chills, cough, chest pain, palpitations, dizziness, syncope, nausea, vomiting, diarrhea,  abdominal pain, bloody black stool, or dysuria.  In the ED, chest x-ray reveals likely pulmonary edema; however, changes secondary to COVID-19 infection not excluded.  Labs show acute renal failure on CKD ,creatinine 4.0, increased above baseline.  He was found to be mildly hypoxic on room air requiring supplemental oxygen.DVT studies was negative,  Troponin also elevated at 0.118.  ASA, full-dose Lovenox, and IV Lasix initiated.  Admitted to hospital medicine for further evaluation and treatment.echo show preserved EF,,he was stabe on 2 liter NC O 2,nephroplogy was consulted for worsening CKD.  24 hours urine protein show protein level 4490.was on IV lasix for fluid overload.he had some improvement in his CRT,worsening CKD likely duo to massive proteinuria,he will follow up with nephrology as out patient.he had woud care by podiatry on his left foot and will follow up with podiatry as out patient.  Patient was discharged home with HH to continue IV Abx with zosyn until 9.1.20 same as before,also lasix was prescribed.he will follow up with PCP,nephrology,podiary as out patient.     Consults:   Consults (From admission, onward)        Status Ordering Provider     Inpatient consult to Nephrology  Once     Provider:  Beatriz Murray MD    Acknowledged RAHAT NAVARRETE     Inpatient consult to Podiatry  Once     Provider:  Aviva Martinez DPM    Completed ZULEIMA BLACKMAN     Inpatient consult to Social Work  Once     Provider:  (Not yet assigned)    Acknowledged ZULEIMA BLACKMAN          No new Assessment & Plan notes have been filed under this hospital service since the last note was generated.  Service: Hospital Medicine    Final Active Diagnoses:    Diagnosis Date Noted POA    PRINCIPAL PROBLEM:  Acute respiratory failure with hypoxia [J96.01] 08/03/2020 Yes    Elevated troponin [R79.89] 08/03/2020 Yes    Osteomyelitis of left foot [M86.9]  Yes    Acute kidney injury superimposed on chronic kidney  disease [N17.9, N18.9] 07/16/2020 Yes    Hyponatremia [E87.1] 07/16/2020 Yes    COVID-19 virus infection [U07.1] 07/16/2020 Yes    Hyperlipidemia, acquired [E78.5] 12/24/2014 Yes     Chronic    Uncontrolled type 2 diabetes mellitus with stage 3 chronic kidney disease, without long-term current use of insulin [E11.22, E11.65, N18.3] 08/26/2014 Yes     Chronic    Essential hypertension [I10] 08/26/2014 Yes     Chronic      Problems Resolved During this Admission:       Discharged Condition: stable    Disposition: Home-Health Care [facility]    Follow Up:  Follow-up Information     Aviva Martinez DPM On 8/7/2020.    Specialties: Podiatry, Wound Care  Why: @ 10:00  Contact information:  [address] [facility] [place] [address] [phone]             [facility].    Specialties: DME Provider, Home Health Services  Why: for resumption of care- dressing changes every monday and wednesday.  Contact information:  [address] [facility] [place] [address]  [phone]             Azikiwe K Lombard, MD On 8/19/2020.    Specialty: Family Medicine  Why: @ 10:40  Contact information:  [address] [facility] [place] [id number] [phone]             Aviva Martinez DPM On 8/14/2020.    Specialties: Podiatry, Wound Care  Why: @ 9:00 am  Contact information:  [address] [facility] [place] [address] [phone]             Donato Valentin MD In 1 week.    Specialty: Nephrology  Contact information:  [address] [place] [address] [address]  [place] [id number] [phone]                 Patient Instructions:      COVID-19 Surveillance Program     Order Specific Question Answer Comments   Does patient have a smartphone? Yes    Does patient have the [facility] eran on their smartphone? Yes    While in surveillance program, will patient be using home oxygen? No      Activity as tolerated       Significant Diagnostic Studies: Labs:   BMP:   Recent Labs   Lab 08/06/20  0511 08/07/20  0443   * 104   * 135*   K 4.1 3.8   CL  100 99   CO2 26 28   BUN 55* 55*   CREATININE 3.8* 3.7*   CALCIUM 8.4* 8.2*   , CMP   Recent Labs   Lab 08/06/20  0511 08/07/20  0443   * 135*   K 4.1 3.8    99   CO2 26 28   * 104   BUN 55* 55*   CREATININE 3.8* 3.7*   CALCIUM 8.4* 8.2*   ANIONGAP 7* 8   ESTGFRAFRICA 19* 20*   EGFRNONAA 17* 17*    and CBC No results for input(s): WBC, HGB, HCT, PLT in the last 48 hours.  Radiology: X-Ray: CXR: X-Ray Chest 1 View (CXR): No results found for this visit on 08/03/20. and X-Ray Chest PA and Lateral (CXR): No results found for this visit on 08/03/20.  Cardiac Graphics: Echocardiogram:   2D echo with color flow doppler: No results found for this or any previous visit. and Transthoracic echo (TTE) complete (Cupid Only):   Results for orders placed or performed during the hospital encounter of 08/03/20   Echo Color Flow Doppler? Yes   Result Value Ref Range    BSA 2.65 m2    TDI SEPTAL 0.13 m/s    LV LATERAL E/E' RATIO 8.87 m/s    LV SEPTAL E/E' RATIO 10.23 m/s    LA WIDTH 3.88 cm    AORTIC VALVE CUSP SEPERATION 2.77 cm    TDI LATERAL 0.15 m/s    PV PEAK VELOCITY 1.15 cm/s    LVIDD 4.60 3.5 - 6.0 cm    IVS 1.22 (A) 0.6 - 1.1 cm    PW 1.31 (A) 0.6 - 1.1 cm    Ao root annulus 3.75 cm    LVIDS 2.93 2.1 - 4.0 cm    FS 36 28 - 44 %    LA volume 78.61 cm3    Sinus 3.51 cm    STJ 2.84 cm    Ascending aorta 3.04 cm    LV mass 221.19 g    LA size 3.87 cm    RVDD 3.39 cm    TAPSE 2.43 cm    RV S' 17.82 cm/s    Left Ventricle Relative Wall Thickness 0.57 cm    AV mean gradient 6 mmHg    AV valve area 3.08 cm2    AV Velocity Ratio 0.69     AV index (prosthetic) 0.76     MV valve area p 1/2 method 4.83 cm2    E/A ratio 1.71     Mean e' 0.14 m/s    E wave decelartion time 156.97 msec    IVRT 53.06 msec    Pulm vein S/D ratio 1.23     LVOT diameter 2.27 cm    LVOT area 4.0 cm2    LVOT peak haley 1.13 m/s    LVOT peak VTI 21.64 cm    Ao peak haley 1.63 m/s    Ao VTI 28.40 cm    LVOT stroke volume 87.53 cm3    AV peak  gradient 11 mmHg    E/E' ratio 9.50 m/s    MV Peak E Young 1.33 m/s    TR Max Young 2.75 m/s    MV stenosis pressure 1/2 time 45.52 ms    MV Peak A Young 0.78 m/s    PV Peak S Young 0.86 m/s    PV Peak D Young 0.70 m/s    LV Systolic Volume 33.08 mL    LV Systolic Volume Index 12.9 mL/m2    LV Diastolic Volume 97.34 mL    LV Diastolic Volume Index 38.09 mL/m2    LA Volume Index 30.8 mL/m2    LV Mass Index 87 g/m2    RA Major Axis 5.73 cm    Left Atrium Minor Axis 6.08 cm    Left Atrium Major Axis 6.24 cm    Triscuspid Valve Regurgitation Peak Gradient 30 mmHg    RA Width 2.58 cm    Right Atrial Pressure (from IVC) 3 mmHg    TV rest pulmonary artery pressure 33 mmHg    Narrative    · Normal left ventricular systolic function. The estimated ejection   fraction is 65%.  · Concentric left ventricular remodeling.  · Normal LV diastolic function.  · Normal right ventricular systolic function.  · Mild pulmonary hypertension present.  · Normal central venous pressure (3 mmHg).  · The estimated PA systolic pressure is 33 mmHg.          Pending Diagnostic Studies:     None         Medications:  Reconciled Home Medications:      Medication List      START taking these medications    furosemide 40 MG tablet  Commonly known as: LASIX  Take 1 tablet (40 mg total) by mouth once daily.     PIPERACILLIN-TAZOBACTAM 4.5G/100ML SODIUM CHLORIDE 0.9%-READY TO MIX  Inject 100 mLs (4.5 g total) into the vein every 12 (twelve) hours.     pulse oximeter device  Commonly known as: pulse oximeter  by Apply Externally route 2 (two) times a day. Use twice daily at 8 AM and 3 PM and record the value in Health system as directed.        CHANGE how you take these medications    hydrALAZINE 100 MG tablet  Commonly known as: APRESOLINE  Take 1 tablet (100 mg total) by mouth every 8 (eight) hours.  What changed:   · medication strength  · how much to take     insulin aspart U-100 100 unit/mL (3 mL) Inpn pen  Commonly known as: NovoLOG  Inject 5 Units into the skin 3  "(three) times daily with meals.  What changed: how much to take     insulin detemir U-100 100 unit/mL (3 mL) Inpn pen  Commonly known as: LEVEMIR FLEXTOUCH  Inject 10 Units into the skin every evening.  What changed:   · how much to take  · when to take this        CONTINUE taking these medications    amLODIPine 10 MG tablet  Commonly known as: NORVASC  Take 1 tablet (10 mg total) by mouth once daily.     atorvastatin 40 MG tablet  Commonly known as: LIPITOR  Take 1 tablet (40 mg total) by mouth once daily.     blood sugar diagnostic Strp  1 strip by Misc.(Non-Drug; Combo Route) route 3 (three) times daily. Patient needs One Touch Test Strips (Berio).     carvediloL 25 MG tablet  Commonly known as: COREG  Take 1 tablet (25 mg total) by mouth 2 (two) times daily.     gabapentin 300 MG capsule  Commonly known as: NEURONTIN  Take 1 capsule (300 mg total) by mouth 2 (two) times daily.     pen needle, diabetic 32 gauge x 3/16" Ndle  1 each by Misc.(Non-Drug; Combo Route) route 5 (five) times daily.        STOP taking these medications    TRULICITY 0.75 mg/0.5 mL pen injector  Generic drug: dulaglutide            Indwelling Lines/Drains at time of discharge:   Lines/Drains/Airways     Peripherally Inserted Central Catheter Line            PICC Double Lumen 07/20/20 2020 right basilic 17 days                Time spent on the discharge of patient: over 30  minutes  Patient was seen and examined on the date of discharge and determined to be suitable for discharge.         Laura Crowder MD  Department of Hospital Medicine  [facility]  "

## 2020-08-07 NOTE — NURSING
Notified Sherry/[phone]/Emmy that patient will need tubing for PICC line for when he is discharged; changed dressing, CW says HH will be doing his antibiotic tonight. Verbalized understanding says they will supply

## 2020-08-07 NOTE — PROGRESS NOTES
"EDUCATION: Ms Mcfadden was  provided with educational information on hypoxemia/respiratory failure. Information is to be brought home for patient and family to use as resource after discharge.  Information included:  problems and symptoms to look for and call the doctor if experiencing, and symptoms that may indicate a medical emergency: CALL 911.      All questions answered.  Teach back method used.    Patient stated, "I will call the doctor if I am coughing up bloody sputum and dizzy and lightheaded  ".        "

## 2020-08-07 NOTE — PHYSICIAN QUERY
"PT Name: Catalino Mcfadden  MR #: [id number]     Diabetic Condition Clarification     CDS/: Dominique Weber  RN CCDS             Contact information:[email or web address]  This form is a permanent document in the medical record.     Query Date: August 7, 2020    By submitting this query, we are merely seeking further clarification of documentation to reflect the severity of illness of your patient. Please utilize your independent clinical judgment when addressing the question(s) below.    The medical record reflects the following:     Indicators   Supporting Clinical Findings Location in Medical Record   x Diabetes uncontrolled documented CKD stage 3 due to type 1 diabetes mellitus   CKD stage 3 due to type 2 diabetes mellitus       Uncontrolled type 2 diabetes mellitus with stage 3 chronic kidney disease, without long-term current use of insulin    H&P-DS            H&P-DS   x Lab Value(s), POCT glucose value(s)   68 (L) 86 114 (H) 94 100 128 (H) 129 (H) 155 (H) 115 (H) 113 (H) 129 (H) 153 (H)    POC accuchecks 8/3-8/7   x Hemoglobin A1C Hemoglobin A1C= 8.1 Lab 8/3    Serum Osmolarity      pH      Anion gap/ Bicarb levels     x Treatment/Medication Insulin SQ SS  Insulin detemir U-100  Novolog 100u/ml 5 units SQ TID with meals  Levemir 100u/ml 10 units QHS MAR    Other       Due to conflicting documentation please indicate the type of diabetes along with the meaning of "uncontrolled".  Thank you.    [   ] Diabetes mellitus Type 1 with hyperglycemia   [   ] Diabetes mellitus Type 1 not uncontrolled   [ x  ] Diabetes mellitus Type 2 with hyperglycemia   [   ] Diabetes mellitus Type 2 not uncontrolled   [   ] Other:   [   ]  Clinically Undetermined       Please document in your progress notes daily for the duration of treatment until resolved, and include in your discharge summary.  "

## 2020-08-07 NOTE — PLAN OF CARE
[facility]    HOME HEALTH ORDERS  FACE TO FACE ENCOUNTER    Patient Name: Catalino Mcfadden  YOB: 1965    PCP: Azikiwe K Lombard, MD   PCP Address: [address] / [place] [address]  PCP Phone Number: [phone]  PCP Fax: [phone]    Encounter Date: 08/07/2020    Admit to Home Health    Diagnoses:  Active Hospital Problems    Diagnosis  POA    *Acute respiratory failure with hypoxia [J96.01]  Yes    Elevated troponin [R79.89]  Yes    Osteomyelitis of left foot [M86.9]  Yes    Acute kidney injury superimposed on chronic kidney disease [N17.9, N18.9]  Yes    Hyponatremia [E87.1]  Yes    COVID-19 virus infection [U07.1]  Yes    Hyperlipidemia, acquired [E78.5]  Yes     Chronic    Uncontrolled type 2 diabetes mellitus with stage 3 chronic kidney disease, without long-term current use of insulin [E11.22, E11.65, N18.3]  Yes     Chronic    Essential hypertension [I10]  Yes     Chronic      Resolved Hospital Problems   No resolved problems to display.       Future Appointments   Date Time Provider Department Center   8/12/2020  9:00 AM [facility]     Follow-up Information     Aviva Martinez DPM On 8/7/2020.    Specialties: Podiatry, Wound Care  Why: @ 10:00  Contact information:  [address] [place] [address] [phone]             [facility].    Specialties: DME Provider, Home Health Services  Why: for resumption of care- dressing changes every monday and wednesday.  Contact information:  [address] [facility] [address]  [place] [id number] [phone]             Azikiwe K Lombard, MD In 1 week.    Specialty: Family Medicine  Contact information:  [address] [place] [place] [address] [phone]             Aviva Martinez DPM In 3 days.    Specialties: Podiatry, Wound Care  Contact information:  [address] [place] [address] [phone]             Donato Valentin MD In 1 week.    Specialty:  Neurology  Contact information:  [address] [place] [address]  [phone]                     I have seen and examined this patient face to face today. My clinical findings that support the need for the home health skilled services and home bound status are the following:  Johan IV Abx,    Allergies:Review of patient's allergies indicates:  No Known Allergies    Diet: diabetic diet: 1800 calorie and 2 gram sodium diet    Activities: activity as tolerated    Nursing:   SN to complete comprehensive assessment including routine vital signs. Instruct on disease process and s/s of complications to report to MD. Review/verify medication list sent home with the patient at time of discharge  and instruct patient/caregiver as needed. Frequency may be adjusted depending on start of care date.    Notify MD if SBP > 160 or < 90; DBP > 90 or < 50; HR > 120 or < 50; Temp > 101; Other:               MISCELLANEOUS CARE:  Home Infusion Therapy:   SN to perform Infusion Therapy/Central Line Care.  Review Central Line Care & Central Line Flush with patient.    Administer (drug and dose): zosyn 4.5 mg IV Q 12 hours. Until 9.1.20     Please remove picc line on 9.1.20 after finished Zosyn    Labs,CBC,CMP,ESR,CRP on mondays,first on 8.10.20   Last dose given: 8.7.20                        Home dose due: 8.7.20 at 8 PM    Scrub the Hub: Prior to accessing the line, always perform a 30 second alcohol scrub  Each lumen of the central line is to be flushed at least daily with 10 mL Normal Saline and 3 mL Heparin flush (10 units/mL)  Skilled Nurse (SN) may draw blood from IV access  Blood Draw Procedure:   - Aspirate at least 5 mL of blood   - Discard   - Obtain specimen   - Change injection cap   - Flush with 20 mL Normal Saline followed by a                 3-5 mL Heparin flush (10 units/mL)  Central :   - Sterile dressing changes are done weekly and as needed.   - Use chlor-hexadine scrub to cleanse site, apply  Biopatch to insertion site,       apply securement device dressing   - Injection caps are changed weekly and after EVERY lab draw.   - If sterile gauze is under dressing to control oozing,                 dressing change must be performed every 24 hours until gauze is not needed.          Medications: Review discharge medications with patient and family and provide education.      Current Discharge Medication List      START taking these medications    Details   furosemide (LASIX) 40 MG tablet Take 1 tablet (40 mg total) by mouth once daily.  Qty: 30 tablet, Refills: 0      piperacillin sodium/tazobactam (PIPERACILLIN-TAZOBACTAM 4.5G/100ML SODIUM CHLORIDE 0.9%-READY TO MIX) Inject 100 mLs (4.5 g total) into the vein every 12 (twelve) hours.  Qty:        pulse oximeter (PULSE OXIMETER) device by Apply Externally route 2 (two) times a day. Use twice daily at 8 AM and 3 PM and record the value in Intersect ENTt as directed.  Qty: 1 each, Refills: 0    Comments: This is a NO CHARGE item.  Please override price to zero.  DO NOT PRINT.  NORMAL MODE e-PRESCRIBE ONLY.         CONTINUE these medications which have CHANGED    Details   hydrALAZINE (APRESOLINE) 100 MG tablet Take 1 tablet (100 mg total) by mouth every 8 (eight) hours.  Qty: 90 tablet, Refills: 11    Comments: .      insulin aspart U-100 (NOVOLOG) 100 unit/mL (3 mL) InPn pen Inject 5 Units into the skin 3 (three) times daily with meals.  Qty: 13.5 mL, Refills: 3      insulin detemir U-100 (LEVEMIR FLEXTOUCH) 100 unit/mL (3 mL) SubQ InPn pen Inject 10 Units into the skin every evening.  Qty: 9 mL, Refills: 3         CONTINUE these medications which have NOT CHANGED    Details   amLODIPine (NORVASC) 10 MG tablet Take 1 tablet (10 mg total) by mouth once daily.  Qty: 90 tablet, Refills: 3      carvediloL (COREG) 25 MG tablet Take 1 tablet (25 mg total) by mouth 2 (two) times daily.  Qty: 60 tablet, Refills: 11    Comments: .      gabapentin (NEURONTIN) 300 MG capsule  "Take 1 capsule (300 mg total) by mouth 2 (two) times daily.  Qty: 60 capsule, Refills: 11      atorvastatin (LIPITOR) 40 MG tablet Take 1 tablet (40 mg total) by mouth once daily.  Qty: 90 tablet, Refills: 3    Associated Diagnoses: Pure hypercholesterolemia      blood sugar diagnostic Strp 1 strip by Misc.(Non-Drug; Combo Route) route 3 (three) times daily. Patient needs One Touch Test Strips (Berio).  Qty: 100 strip, Refills: 2    Associated Diagnoses: Uncontrolled type 2 diabetes mellitus with complication, with long-term current use of insulin      pen needle, diabetic 32 gauge x 3/16" Ndle 1 each by Misc.(Non-Drug; Combo Route) route 5 (five) times daily.  Qty: 100 each, Refills: 10    Comments: OK to substitute with other needle that is approved by patient's insurance if needed         STOP taking these medications       TRULICITY 0.75 mg/0.5 mL PnIj Comments:   Reason for Stopping:               I certify that this patient is confined to his home and needs intermittent skilled nursing care.      "

## 2020-08-07 NOTE — PROGRESS NOTES
[facility]  Podiatry  Progress Note    Patient Name: Catalino Mcfadden  MRN: [id number]  Admission Date: 8/3/2020  Hospital Length of Stay: 4 days  Attending Physician: Laura Crowder MD  Primary Care Provider: Azikiwe K Lombard, MD       Subjective:     History of Present Illness:   Catalino Mcfadden is a 55 y.o. male with  has a past medical history of CKD (chronic kidney disease), stage III, CKD stage 3 due to type 1 diabetes mellitus, CKD stage 3 due to type 2 diabetes mellitus, Diabetes mellitus, type 2, Diabetic foot ulcer associated with diabetes mellitus due to underlying condition, Diabetic foot ulcers, Edema, Hyperlipidemia, Hypertension, and Obese.  Consult to Podiatry for evaluation and treatment of left foot ulcer.  Patient is known to our service.  On last admit was treated for osteomyelitis to the 5th metatarsal.  During admit patient was given the choice between ray amputation or IV antibiotics with wound care.  Patient elected for antibiotics and wound care. Patient is status post surgical debridement with bone biopsy on 07/17/2020 by Dr. Verona Whitmore. He was last seen by me in the W[facility] Care Center on Friday and orders were placed to initiate a wound VAC in the outpatient setting.  Until that time patient was having home health dressing changes twice weekly.   Denies pedal pain. CAM boot intact left foot.  No new pedal complaints.  Patient admitted for non podiatric complaint.    8/5/20: Patient seen bedside. CAM boot intact. No new issues.     08/07/2020 Patient seen bedside. CAM boot intact. No new pedal issues. Patient relates that is breathing is no longer labored    Chief Complaint   Patient presents with    Shortness of Breath     started  yesterday    Bloated    Leg Swelling     edema getting worse since starting abx    Wheezing     started yesterday         Scheduled Meds:   amLODIPine  10 mg Oral Daily    aspirin  325 mg Oral Daily    cadexomer iodine   Topical (Top)  Daily    carvediloL  12.5 mg Oral BID    ergocalciferol  50,000 Units Oral Q7 Days    furosemide (LASIX) IV  40 mg Intravenous Q12H    heparin (porcine)  5,000 Units Subcutaneous Q8H    hydrALAZINE  100 mg Oral Q8H    piperacillin-tazobactam (ZOSYN) IVPB  4.5 g Intravenous Q12H     Continuous Infusions:  PRN Meds:acetaminophen, cloNIDine, dextrose 50%, dextrose 50%, glucagon (human recombinant), glucose, glucose, insulin aspart U-100    Review of patient's allergies indicates:  No Known Allergies     Past Medical History:   Diagnosis Date    CKD (chronic kidney disease), stage III 07/16/2020    CKD stage 3 due to type 1 diabetes mellitus     CKD stage 3 due to type 2 diabetes mellitus     Diabetes mellitus, type 2     Diabetic foot ulcer associated with diabetes mellitus due to underlying condition 07/16/2020    Diabetic foot ulcers     Edema 08/03/2020    generalized, including genital area    Hyperlipidemia     Hypertension     Obese      Past Surgical History:   Procedure Laterality Date    BONE BIOPSY Left 7/17/2020    Procedure: BIOPSY, BONE;  Surgeon: Verona Whitmore DPM;  Location: [facility] OR;  Service: Podiatry;  Laterality: Left;    CERVICAL DISC SURGERY  07/11/2016    DEBRIDEMENT Left 7/17/2020    Procedure: DEBRIDEMENT;  Surgeon: Verona Whitmore DPM;  Location: [facility] OR;  Service: Podiatry;  Laterality: Left;    DEBRIDEMENT OF FOOT Right     toes & plantar surface    FRACTURE SURGERY Right     medial ankle, metal plate present    left leg orthopedic surgery Left        Family History     Problem Relation (Age of Onset)    Heart disease Father        Tobacco Use    Smoking status: Never Smoker    Smokeless tobacco: Never Used   Substance and Sexual Activity    Alcohol use: Not Currently     Alcohol/week: 0.0 standard drinks     Comment: social    Drug use: No    Sexual activity: Not on file     Review of Systems   Constitutional: Positive for activity change and fatigue. Negative  for appetite change, chills and fever.   Respiratory: Positive for shortness of breath and wheezing. Negative for cough.    Cardiovascular: Positive for leg swelling. Negative for chest pain.   Gastrointestinal: Positive for abdominal distention. Negative for diarrhea, nausea and vomiting.   Musculoskeletal: Positive for arthralgias, joint swelling and myalgias.   Skin: Positive for wound.   Neurological: Positive for numbness and headaches. Negative for weakness.        + paresthesia      Objective:     Vital Signs (Most Recent):  Temp: 98.9 °F (37.2 °C) (08/07/20 0742)  Pulse: 94 (08/07/20 0742)  Resp: 19 (08/07/20 0742)  BP: (!) 166/89 (08/07/20 0742)  SpO2: 95 % (08/07/20 1011) Vital Signs (24h Range):  Temp:  [98.4 °F (36.9 °C)-98.9 °F (37.2 °C)] 98.9 °F (37.2 °C)  Pulse:  [85-94] 94  Resp:  [18-20] 19  SpO2:  [93 %-97 %] 95 %  BP: (139-166)/(73-89) 166/89     Weight: 136 kg (299 lb 13.2 oz)  Body mass index is 39.56 kg/m².    Physical Exam  Vitals signs and nursing note reviewed.   Constitutional:       General: He is not in acute distress.     Appearance: He is not toxic-appearing or diaphoretic.      Comments: Pt. is well-developed, well-nourished, appears stated age, in no acute distress, alert and oriented x 3. No evidence of depression, anxiety, or agitation. Calm, cooperative, and communicative. Appropriate interactions and affect.   Cardiovascular:      Pulses:           Dorsalis pedis pulses are 2+ on the right side and 2+ on the left side.        Posterior tibial pulses are 1+ on the right side and 1+ on the left side.      Comments: There is decreased digital hair. Skin is atrophic, slightly hyperpigmented. Edema to BLE  Pulmonary:      Effort: No respiratory distress.   Musculoskeletal:      Right ankle: He exhibits normal range of motion. No tenderness. No lateral malleolus, no medial malleolus, no AITFL, no CF ligament and no posterior TFL tenderness found. Achilles tendon exhibits no pain, no  defect and normal Hilliard's test results.      Left ankle: He exhibits normal range of motion . No tenderness. No lateral malleolus, no medial malleolus, no AITFL, no CF ligament and no posterior TFL tenderness found. Achilles tendon exhibits no pain, no defect and normal Hilliard's test results.      Right foot: No tenderness or bony tenderness.      Left foot: No tenderness or bony tenderness.      Comments: Decreased stride, station of gait.  apropulsive toe off.  Increased angle and base of gait.    There is equinus deformity bilateral with decreased dorsiflexion at the ankle joint bilateral. No tenderness with compression of heel. Negative tinels sign. Gait analysis reveals excessive pronation through midstance and propulsion with early heel off.     Patient has hammertoes of digits 2-5 bilateral partially reducible     Decreased first MPJ range of motion both weightbearing and nonweightbearing, no crepitus observed the first MP joint, + dorsal flag sign.     Visible and palpable bunion without pain at dorsomedial 1st metatarsal head right and left.  Hallux abducted right and left partially reducible, tracks laterally without being track bound.  No ecchymosis, erythema, edema, or cardinal signs infection or signs of trauma same foot.    Fat pad atrophy to heels and met heads bilateral    Plantarflexed 1st ray RLE   Lymphadenopathy:      Comments: No lymphatic streaking    Negative lymphadenopathy bilateral popliteal fossa and tarsal tunnel.     Skin:     General: Skin is warm and dry.      Coloration: Skin is not pale.      Findings: Erythema and lesion (wound description) present. No abrasion, ecchymosis, laceration or rash.      Nails: There is no clubbing.     08/07/2020 8/5/20:                         Comments: Ulcer location: Left  lateral 5th metatarsal base     Present Prior to Hospital Arrival?: Yes   Side: Left   Location: Foot   Orientation: plantar lateral     Wound Image 08/04/2020            Incision WDL ex   Dressing Appearance Moist drainage   Drainage Amount Moderate   Drainage Characteristics/Odor Serosanguineous   Appearance Pink;Red;Yellow;Slough;Sutures intact   Black (%), Wound Tissue Color 0 %   Red (%), Wound Tissue Color 70 %   Yellow (%), Wound Tissue Color 30 %   Periwound Area Macerated   Wound Edges Open   Wound Length (cm) 4.2 cm   Wound Width (cm) 3.5 cm   Wound Depth (cm) 0.9 cm       Toenails 1-5 bilaterally discolored/yellowed, dystrophic, brittle with subungual debris.    Neurological:      Sensory: Sensory deficit present.      Comments:  [place] 5.07 monofilamant testing is diminished Kp feet. Decreased/absent vibratory sensation bilateral feet to 128Hz tuning fork.     Paresthesias, and hyperesthesia bilateral feet with no clearly identified trigger or source.     Psychiatric:         Attention and Perception: He is attentive.         Mood and Affect: Mood is not anxious. Affect is not inappropriate.         Speech: He is communicative. Speech is not slurred.         Behavior: Behavior is not combative.          Laboratory:  A1C:   Recent Labs   Lab 07/17/20  0455   HGBA1C 8.1*     CBC:   Recent Labs   Lab 08/03/20  1426   WBC 8.46   RBC 3.34*   HGB 8.8*   HCT 28.6*      MCV 86   MCH 26.3*   MCHC 30.8*     CMP:   Recent Labs   Lab 08/03/20  1426  08/07/20  0443   GLU 93   < > 104   CALCIUM 8.4*   < > 8.2*   ALBUMIN 2.1*  --   --    PROT 6.9  --   --    *   < > 135*   K 4.7   < > 3.8   CO2 19*   < > 28      < > 99   BUN 53*   < > 55*   CREATININE 4.0*   < > 3.7*   ALKPHOS 244*  --   --    ALT 34  --   --    AST 41*  --   --    BILITOT 0.4  --   --     < > = values in this interval not displayed.     CRP:   Recent Labs   Lab 08/03/20  1034   CRP 38.0*     ESR:   Recent Labs   Lab 08/03/20  1034   SEDRATE 134*     Microbiology Results (last 7 days)     ** No results found for the last 168 hours. **          Diagnostic Results:   Imaging Results           US Lower Extremity Veins Right (Final result)  Result time 08/03/20 18:22:16    Final result by Porsha Mcgowan MD (08/03/20 18:22:16)                 Impression:      No evidence of left lower extremity deep venous thrombosis.      Electronically signed by: Porsha Mcgowan MD  Date:    08/03/2020  Time:    18:22             Narrative:    EXAMINATION:  US LOWER EXTREMITY VEINS RIGHT    CLINICAL HISTORY:  Edema, unspecified    TECHNIQUE:  Duplex and color flow Doppler evaluation of the left lower extremity veins was performed.    COMPARISON:  None    FINDINGS:  No evidence of clot involving the bilateral common femoral veins or left greater saphenous, femoral, popliteal, peroneal, anterior and posterior tibial veins.  All venous structures demonstrate normal respiratory phasicity and augment adequately.  No evidence of soft tissue mass or Baker's cyst.                               US Upper Extremity Veins Right (Final result)  Result time 08/03/20 18:24:28    Final result by Porsha Mcgowan MD (08/03/20 18:24:28)                 Impression:      No evidence of right upper extremity deep venous thrombosis.      Electronically signed by: Porsha Mcgowan MD  Date:    08/03/2020  Time:    18:24             Narrative:    EXAMINATION:  US UPPER EXTREMITY VEINS RIGHT    CLINICAL HISTORY:  RUE edema with PICC line;    TECHNIQUE:  Duplex and color flow Doppler evaluation and dynamic compression was performed of the upper extremity veins.    COMPARISON:  None    FINDINGS:  PICC line seen on the right.  No evidence of clot involving the bilateral jugular and subclavian veins or right axillary, brachial, basilic and cephalic veins.  All venous structures demonstrate normal respiratory phasicity and augment adequately.  No soft tissue masses.                               X-Ray Chest AP Portable (Final result)  Result time 08/03/20 16:23:08    Final result by Rogelio Ellsworth MD (08/03/20 16:23:08)                 Impression:       1. Central pulmonary vascular congestion associated with interstitial edema, likely representing CHF or pulmonary edema.  Superimposed infectious process not excluded.      Electronically signed by: Rogelio Ellsworth MD  Date:    08/03/2020  Time:    16:23             Narrative:    EXAMINATION:  XR CHEST AP PORTABLE    CLINICAL HISTORY:  Shortness of Breath;    TECHNIQUE:  Single frontal view of the chest was performed.    COMPARISON:  Chest 07/20/2020    FINDINGS:  There is enlargement of the cardiac silhouette.  There is presence of a right PICC line in unchanged position.    Since the previous examination there is significant central pulmonary vascular congestion with prominence of the interstitial markings, suspicious for CHF or pulmonary edema.  Superimposed infectious process not excluded.  There is no pleural effusions.    Postoperative changes from cervical disc prosthesis in the lower cervical spine noted.  There are cardiac monitoring leads over the chest.                                  Assessment/Plan:     Active Diagnoses:    Diagnosis Date Noted POA    PRINCIPAL PROBLEM:  Acute respiratory failure with hypoxia [J96.01] 08/03/2020 Yes    Elevated troponin [R79.89] 08/03/2020 Yes    Osteomyelitis of left foot [M86.9]  Yes    Acute kidney injury superimposed on chronic kidney disease [N17.9, N18.9] 07/16/2020 Yes    Hyponatremia [E87.1] 07/16/2020 Yes    COVID-19 virus infection [U07.1] 07/16/2020 Yes    Hyperlipidemia, acquired [E78.5] 12/24/2014 Yes     Chronic    Uncontrolled type 2 diabetes mellitus with stage 3 chronic kidney disease, without long-term current use of insulin [E11.22, E11.65, N18.3] 08/26/2014 Yes     Chronic    Essential hypertension [I10] 08/26/2014 Yes     Chronic      Problems Resolved During this Admission:         Greater than 50% of this visit spent on counseling and coordination of care.    Education about the prevention of limb loss.    Discussed wound healing cycle,  skin integrity, ways to care for skin.Counseled patient on the effects of blood glucose on healing. He verbalizes understanding that it can increase the chances of delayed healing and this prolonged exposure leads to infection or progression of infection which subsequently can result in loss of limb.    The wound is cleansed of foreign material as much as possible and the base inspected for bone or abscess.  Base is fibrogranular with bone no longer palpable to wound bed  Bone biopsy acquired on previous admit.  Patient already evaluated and treated by Infectious Disease with plans for Zosyn until September.  PICC line in place.  No surgical intervention indicated at this time.    Wound is stable.  Planfor application of wound VAC in the outpatient setting in the Wound Care Center.    Football design applied for discharge, continue HH as previously ordered      Thank you for your consult. I will follow-up with patient. Please contact us if you have any additional questions.    Aviva Martinez DPM  Podiatry  [facility]

## 2020-08-07 NOTE — PROGRESS NOTES
Renal Progress Note    Date of Admission:  8/3/2020  1:51 PM    Length of Stay: 4  Days    Subjective: n/a    Objective:    Current Facility-Administered Medications   Medication    acetaminophen tablet 650 mg    amLODIPine tablet 10 mg    aspirin EC tablet 325 mg    cadexomer iodine 0.9 % gel    cloNIDine tablet 0.2 mg    dextrose 50% injection 12.5 g    dextrose 50% injection 25 g    ergocalciferol capsule 50,000 Units    furosemide injection 40 mg    glucagon (human recombinant) injection 1 mg    glucose chewable tablet 16 g    glucose chewable tablet 24 g    heparin (porcine) injection 5,000 Units    hydrALAZINE tablet 100 mg    insulin aspart U-100 pen 0-5 Units    piperacillin-tazobactam 4.5 g in sodium chloride 0.9% 100 mL IVPB (ready to mix system)       Vitals:    08/06/20 2334 08/07/20 0047 08/07/20 0535 08/07/20 0742   BP: 139/85 (!) 159/73 139/85 (!) 166/89   BP Location:  Left arm Left arm Left arm   Patient Position:  Lying Lying Lying   Pulse:  85 87 94   Resp:  20 18 19   Temp: 98.6 °F (37 °C) 98.4 °F (36.9 °C) 98.6 °F (37 °C) 98.9 °F (37.2 °C)   TempSrc:  Oral Oral Oral   SpO2:  96% 97% (!) 93%   Weight:       Height:           I/O last 3 completed shifts:  In: 340 [P.O.:240; IV Piggyback:100]  Out: 5850 [Urine:5850]  No intake/output data recorded.      Physical Exam: Deferred / covid-19 + on isolation    General: NAD  Neck: supple  Heart: RRR  Lungs: unlabored breathing  Abdomen: n/a  Limbs: n/a  Neurologic: AAO x 3      Laboratories:    No results for input(s): WBC, RBC, HGB, HCT, PLT, MCV, MCH, MCHC in the last 24 hours.    Recent Labs   Lab 08/07/20  0443   CALCIUM 8.2*   *   K 3.8   CO2 28   CL 99   BUN 55*   CREATININE 3.7*       No results for input(s): COLORU, CLARITYU, SPECGRAV, PHUR, PROTEINUA, GLUCOSEU, BLOODU, WBCU, RBCU, BACTERIA, MUCUS in the last 24 hours.    Invalid input(s):  BILIRUBINCON    Microbiology Results (last 7 days)     ** No  results found for the last 168 hours. **            Diagnostic Tests: n/a        Assessment:       54 y/o male recently discharged from [facility] due to toe Osteo admitted with:     - MAURICE on top of CKD-3b (creatinine about same)  - Hypervolemic - HypoNa+  - Acute resp. Failure with hypoxia  - Nephrotic syndrome with Anasarca likely 2nd. Infectious Nephropathy 2nd. COVID-19 ? Toe-infection - Osteo?  - Mild NAGMA  RESOLVED  - COVID-19 infection (+ test 7/16)  - L-5th. MT ulcer  - Anemia et?  - HTN  - Uncontrolled DM-2  - Hypoalbuminemia r/o Nephrotic syndrome  - Abnormal troponin et?  - Vit D def.           Plan:      - Renal ADA diet with fluid restriction  - Judicious diuresis  - Renal ADA diet  - f/u BMP  - NO ACEI or ARBs   - Oral vit D  - Respiratory Isolation  - Podiatry following  - Wound care  - Antibiotics and COVID-19 management per admitting  - Glycemic control and other problems per admitting  - Pte. Will need a Kidney Biopsy once recovered from COVID-19

## 2020-08-07 NOTE — NURSING
Gave pt discharge instructions,  prescriptions at pharmacy and pt says he has insulin at home. Patient says he is hesitant to take insulin, instructed to check BS and if it's too low do not take it and to follow protocol for low BS.Pulse ox not needed due to not going home on oxygen per CW and Dr Sanchez. Verbalized understanding. Patient says ride will come after 4pm.

## 2020-08-07 NOTE — PROGRESS NOTES
[facility] CASE MANAGEMENT                  WRITTEN DISCHARGE INFORMATION      APPOINTMENTS AND RESOURCES TO HELP YOU MANAGE YOUR CARE AT HOME BASED ON YOUR PREFERENCES:  (If an appointment is not scheduled for you when you leave the hospital, call your doctor to schedule a follow up visit within a week)  Follow-up Information     Aviva Martinez DPM On 8/7/2020.    Specialties: Podiatry, Wound Care  Why: @ 10:00  Contact information:  [address]  [place] [address]  [phone]             [facility].    Specialties: DME Provider, Home Health Services  Why: for resumption of care- dressing changes every monday and wednesday.  Contact information:  [address]  [place] [address]  [phone]             Azikiwe K Lombard, MD On 8/19/2020.    Specialty: Family Medicine  Why: @ 10:40  Contact information:  [address]  [place] [address]  [phone]             Aviva Martinez DPM On 8/14/2020.    Specialties: Podiatry, Wound Care  Why: @ 9:00 am  Contact information:  [address]  [place] [id number] [phone]             Donato Valentin MD In 1 week.    Specialty: Nephrology  Contact information:  [address] [place] [address] [address]  [place] [address]  [phone]                       Healthy Living Instructions to HELP MANAGE YOUR CARE AT HOME:  Things You are responsible for:  1.    Getting your prescriptions filled   2.    Taking your medications as directed, DO NOT MISS ANY DOSES!  3.    Following the diet and exercise recommended by your doctor  4.    Going to your follow-up doctor appointment. This is important because it allows the doctor to monitor your progress and determine if any changes need to made to your treatment plan.  5. If you have any questions about MANAGING YOUR CARE AT HOME Call the Nurse Care Line for 24/7 Assistance [phone]       Please answer any calls you may receive from [facility]. We want to continue to support you as  you manage your healthcare needs. [facility] is happy to have the opportunity to serve you.      Thank you for choosing [facility] for your healthcare needs!  Your [facility] Case Management Team,

## 2020-08-08 ENCOUNTER — NURSE TRIAGE (OUTPATIENT)
Dept: ADMINISTRATIVE | Facility: CLINIC | Age: 55
End: 2020-08-08

## 2020-08-08 ENCOUNTER — HOSPITAL ENCOUNTER (EMERGENCY)
Facility: HOSPITAL | Age: 55
Discharge: HOME | End: 2020-08-08
Attending: EMERGENCY MEDICINE
Payer: COMMERCIAL

## 2020-08-08 VITALS
HEIGHT: 73 IN | SYSTOLIC BLOOD PRESSURE: 157 MMHG | RESPIRATION RATE: 18 BRPM | WEIGHT: 245 LBS | BODY MASS INDEX: 32.47 KG/M2 | TEMPERATURE: 99 F | OXYGEN SATURATION: 98 % | HEART RATE: 76 BPM | DIASTOLIC BLOOD PRESSURE: 90 MMHG

## 2020-08-08 DIAGNOSIS — T82.594A OBSTRUCTION OF CENTRAL LINE, INITIAL ENCOUNTER: Primary | ICD-10-CM

## 2020-08-08 DIAGNOSIS — Z95.828 S/P PICC CENTRAL LINE PLACEMENT: ICD-10-CM

## 2020-08-08 LAB — GLUCOSE SERPL-MCNC: 134 MG/DL (ref 70–110)

## 2020-08-08 PROCEDURE — 63600175 PHARM REV CODE 636 W HCPCS: Mod: JG | Performed by: NURSE PRACTITIONER

## 2020-08-08 PROCEDURE — 82962 GLUCOSE BLOOD TEST: CPT

## 2020-08-08 PROCEDURE — 99283 EMERGENCY DEPT VISIT LOW MDM: CPT | Mod: 25

## 2020-08-08 RX ADMIN — ALTEPLASE 2 MG: 2.2 INJECTION, POWDER, LYOPHILIZED, FOR SOLUTION INTRAVENOUS at 01:08

## 2020-08-08 NOTE — TELEPHONE ENCOUNTER
1st attempt to contact pt for enrollment in Covid Surveillance. Spoke with pt who said his Covid protocol was over already. Last time he was discharged from the hospital they enrolled him in home symptom monitoring. He states he was admitted this time for CHF not covid related. Pt states right now he is having issues with his PICC line and may have to go back to the hospital. Tried explaining that this program is optional and I can call back later when pt has more time to go over this program and if he is not interested we can switch him over to the home monitoring. Pt agreed to call back. Will call back later today.    Reason for Disposition   Health Information question, no triage required and triager able to answer question    Protocols used: INFORMATION ONLY CALL - NO TRIAGE-A-

## 2020-08-08 NOTE — ED TRIAGE NOTES
55 y.o male presents to the ED with chief complaint of vascular access problem. Pt reports PICC line is not flushing correctly. No other complaints. AAOx4, NAD.

## 2020-08-08 NOTE — TELEPHONE ENCOUNTER
"Contacted pt for enrollment in Covid Surveillance program. Pt verified name and . Pt stated he was not in the hospital due to Covid. He was diagnosed with Covid on  when he went to the hospital for a foot wound that was infected. Pt states that he was admitted this last time due to kidney issues and fluid overload/HF. Explained to pt that physician ordered this because they felt he needed closer monitoring since being discharged but pt stated he was enrolled in home monitoring text program last time and he doesn't feel like he needs to be in this because he has already completed his quarantine for Covid and he is not having symptoms due to Covid. Pt does not wish to participate in surveillance. Offered text home monitoring and pt agreed. Advised pt to still  pulse ox from pharmacy. (pt was told at discharge he did not need pulse ox since he was not sent home on O2). Gave number to pharmacy to call and make sure pulse ox was ready to  M-F 8-5:30. Gave O2 levels to look out for. If O2 drops to 92% and unable to take deep breaths and it goes back up to contact [facility]. If drops below 90% to call 911 or go to nearest ED. Explained home monitoring program to pt, if no new or worsening symptoms he can ignore text message but if new or worsening symptoms to respond with a "1" and a nurse will call back to triage. If having a medical emergency call 911 or go to nearest ED. Gave number to [facility] if symptoms worsen or any further questions. Sent to MARIA ELENA Sukhi Burnett PA-C to remove surveillance tasks and placed order for home monitoring.    Reason for Disposition   Health Information question, no triage required and triager able to answer question    Protocols used: INFORMATION ONLY CALL - NO TRIAGE-A-      "

## 2020-08-08 NOTE — ED PROVIDER NOTES
Encounter Date: 8/8/2020       History     Chief Complaint   Patient presents with    Vascular Access Problem     PICC line not flushing and ports don't flush     HPI   Patient is a 55-year-old male with a history of osteomyelitis of the left lower extremity for which he was recently hospitalized and discharged on IV antibiotics daily.  He is currently taking Zosyn each day.  He reports that yesterday he noticed that the red port on his right upper extremity PICC line was no longer patent for blood drawing blood or administering medications.  He states he has been flushing it diligently with heparin and saline.  He reports that the home health nurse to change the dressing for the PICC line fear that it may have been pulled out somewhat.    Review of patient's allergies indicates:  No Known Allergies  Past Medical History:   Diagnosis Date    CKD (chronic kidney disease), stage III 07/16/2020    CKD stage 3 due to type 1 diabetes mellitus     CKD stage 3 due to type 2 diabetes mellitus     Diabetes mellitus, type 2     Diabetic foot ulcer associated with diabetes mellitus due to underlying condition 07/16/2020    Diabetic foot ulcers     Edema 08/03/2020    generalized, including genital area    Hyperlipidemia     Hypertension     Obese      Past Surgical History:   Procedure Laterality Date    BONE BIOPSY Left 7/17/2020    Procedure: BIOPSY, BONE;  Surgeon: Verona Whitmore DPM;  Location: [facility] OR;  Service: Podiatry;  Laterality: Left;    CERVICAL DISC SURGERY  07/11/2016    DEBRIDEMENT Left 7/17/2020    Procedure: DEBRIDEMENT;  Surgeon: Verona Whitmore DPM;  Location: [facility] OR;  Service: Podiatry;  Laterality: Left;    DEBRIDEMENT OF FOOT Right     toes & plantar surface    FRACTURE SURGERY Right     medial ankle, metal plate present    left leg orthopedic surgery Left      Family History   Problem Relation Age of Onset    Heart disease Father      Social History     Tobacco Use    Smoking  status: Never Smoker    Smokeless tobacco: Never Used   Substance Use Topics    Alcohol use: Not Currently     Alcohol/week: 0.0 standard drinks     Comment: social    Drug use: No     Review of Systems   Constitutional: Negative for appetite change, chills, diaphoresis, fatigue and fever.   HENT: Negative for congestion, ear discharge, ear pain, postnasal drip, rhinorrhea, sinus pressure, sneezing, sore throat and voice change.    Eyes: Negative for discharge, itching and visual disturbance.   Respiratory: Negative for cough, shortness of breath and wheezing.    Cardiovascular: Negative for chest pain, palpitations and leg swelling.   Gastrointestinal: Negative for abdominal pain, nausea and vomiting.   Endocrine: Negative for polydipsia, polyphagia and polyuria.   Genitourinary: Negative for difficulty urinating, discharge, dysuria, frequency, hematuria, penile pain, penile swelling and urgency.   Musculoskeletal: Negative for arthralgias and myalgias.   Skin: Negative for rash and wound.   Neurological: Negative for dizziness, seizures, syncope and weakness.   Hematological: Negative for adenopathy. Does not bruise/bleed easily.   Psychiatric/Behavioral: Negative for agitation and self-injury. The patient is not nervous/anxious.        Physical Exam     Initial Vitals [08/08/20 1257]   BP Pulse Resp Temp SpO2   138/80 87 18 98.6 °F (37 °C) 96 %      MAP       --         Physical Exam    Nursing note and vitals reviewed.  Constitutional: He appears well-developed and well-nourished. He is not diaphoretic. No distress.   HENT:   Head: Normocephalic and atraumatic.   Right Ear: External ear normal.   Left Ear: External ear normal.   Nose: Nose normal.   Eyes: Pupils are equal, round, and reactive to light. Right eye exhibits no discharge. Left eye exhibits no discharge. No scleral icterus.   Neck: Normal range of motion.   Pulmonary/Chest: No respiratory distress.   Abdominal: He exhibits no distension.  "  Musculoskeletal: Normal range of motion.   Neurological: He is alert and oriented to person, place, and time.   Skin: Skin is dry. Capillary refill takes less than 2 seconds.   Right upper extremity PICC line without redness warmth or exudate.  Purple port flushes, red port is occluded.         ED Course   Procedures  Labs Reviewed - No data to display       Imaging Results          X-Ray Chest 1 View (Final result)  Result time 08/08/20 14:19:32    Final result by Ezekiel Sinha MD (08/08/20 14:19:32)                 Impression:      Right-sided PICC overlies the brachiocephalic/SVC junction.    Cardiomegaly and probable mild interstitial edema.      Electronically signed by: Ezekiel Sinha MD  Date:    08/08/2020  Time:    14:19             Narrative:    EXAMINATION:  XR CHEST 1 VIEW    CLINICAL HISTORY:  Provided history is "  Presence of other vascular implants and grafts".    TECHNIQUE:  One view of the chest.    COMPARISON:  08/03/2020 and 07/20/2020.    FINDINGS:  Cardiac silhouette is enlarged but stable.  Atherosclerotic calcifications overlie the aortic arch.  Stable central vascular congestion and patchy bilateral interstitial opacities suggestive of interstitial edema.  Possible small bilateral pleural effusions.  No pneumothorax.  No detrimental change in lung aeration when compared with the prior study.  New right-sided PICC overlies the brachiocephalic/SVC junction.  Postoperative changes partially visualized in the cervical spine.                                       APC / Resident Notes:   I have ordered a point of care glucoses, I will instill cath flow into the red port and let it sit until it is patent.  I will get an x-ray of the chest to verify placement of the PICC line.    Differential diagnosis includes hyperglycemia, PICC line infection, PICC line occlusion, thrombus formation.              ED Course as of Aug 08 1812   Sat Aug 08, 2020   1311 BP: 138/80 [VC]   1311 Temp: 98.6 °F " (37 °C) [VC]   1311 Temp src: Oral [VC]   1311 Pulse: 87 [VC]   1311 Resp: 18 [VC]   1311 SpO2: 96 % [VC]   1403 Still unable to flush red port.  Other port flushes fine, does not draw back.    [VC]      ED Course User Index  [VC] Damon Mcclain DNP    Approximately 1 hr after instilling Cathflo the red port became patent.  It now draw the blood and allows instillation of fluid.  Patient is discharged home in good condition to follow-up with primary care and should return for any worsening or changes in condition.  Chest x-ray indicated proper placement of the PICC line.See above for analyses of radiology, labs, and events during pt's visit and direct actions taken. Symptomatic therapies and return precautions on AVS.   Medication choices were made after reviewing allergies, medications, history, available laboratories. See below for discharge prescriptions if any and disposition.             Clinical Impression:       ICD-10-CM ICD-9-CM   1. Obstruction of central line, initial encounter  T82.594A 996.74   2. S/P PICC central line placement  Z95.828 V45.89         Disposition:   Disposition: Discharged  Condition: Stable     ED Disposition Condition    Discharge Stable        ED Prescriptions     None        Follow-up Information     Follow up With Specialties Details Why Contact Info    Azikiwe K. Lombard, MD Family Medicine Schedule an appointment as soon as possible for a visit   [address] [place] [address]  [phone]                                       Damon Mcclain DNP  08/08/20 9663

## 2020-08-09 ENCOUNTER — PATIENT OUTREACH (OUTPATIENT)
Dept: ADMINISTRATIVE | Facility: CLINIC | Age: 55
End: 2020-08-09

## 2020-08-09 DIAGNOSIS — Z09 NEED FOR FOLLOW-UP BY HOME HEALTH SERVICE: Primary | ICD-10-CM

## 2020-08-09 NOTE — PATIENT INSTRUCTIONS
Discharge Instructions: Caring for Your Peripherally Inserted Central Catheter (PICC)  You are going home with a peripherally inserted central catheter (PICC). This small, soft tube has been placed in a vein in your arm. It is often used when treatment requires medicines or nutrition for weeks or months. At home, you need to take care of your PICC to keep it working. Because a PICC line has a high infection risk, you must take extra care washing your hands and preventing the spread of germs. This sheet will help you remember what to do to care for your PICC at home.  Understanding your role  · A nurse or other healthcare provider will teach you and your caregivers how to care for the PICC. Before leaving the hospital, make sure you understand what to do at home, how long you may need the PICC, and when to have a follow-up visit.  · You will likely be told to flush the PICC with saline or heparin solution. You may also be told to change the catheters injection caps and change the dressing (bandage). Or, a nurse may do this for you during a follow-up visit. Only do these things if youre told to, following the instructions you were given.  Protecting the PICC  If the PICC gets damaged, it wont work right and could raise your chance of infection. Call your healthcare team right away if any damage occurs. To protect the PICC at home:  · Prevent infection. Use good hand hygiene by following the guidelines on this sheet. Dont touch the catheter or dressing unless you need to. And always clean your hands before and after you come in contact with any part of the PICC. Your caregivers, family members, and any visitors should use good hand hygiene, too.  · Keep the PICC dry. The catheter and dressing must stay dry. Dont take baths, go swimming, use a hot tub, or do other things that could get the PICC wet. Take a sponge bath to avoid getting your catheter wet, unless your healthcare provider tells you otherwise. Ask  your provider about the best way to keep your catheter dry when bathing or showering. If the dressing does get wet, change it only if you have been shown how. Otherwise, call your healthcare team right away for help.  · Avoid damage. Dont use any sharp or pointy objects around the catheter. This includes scissors, pins, knives, razors, or anything else that could cut it or put a hole in it (puncture it). Also, dont let anything pull or rub on the catheter, such as clothing.  · Watch for signs of problems. Pay attention to how much of the catheter sticks out from your skin. If this changes at all, let your healthcare provider know. Also watch for cracks, leaks, or other damage. If the dressing becomes dirty, loose, or wet, change it (if you have been instructed to). Or call your healthcare team right away.  · Avoid lowering your chest below your waist. This includes bending at the waist to do things like tying your shoes. When your chest is below your waist, especially for a long time, the catheters internal tip could slip out of place in the vein.  · Tell your healthcare team if you vomit or have severe coughing. This can also make the catheter slip out of place.  Protecting your arm  The arm with the PICC is at risk for developing blood clots (thrombosis). This is a serious problem. To help prevent it:  · As much as possible, use the arm with the PICC in it for normal daily activities. Lack of movement can lead to blood clots. So its important to move your arm as you normally would. Your healthcare team may suggest light arm exercises.  · Avoid activities or exercises that require major use of your arm, such as sports, unless your healthcare provider says its OK.  · Avoid any activities that cause mild pain in your arm. Talk to your healthcare team if you have concerns about pain or range of motion.  · Dont lift anything heavier than 10 pounds with the affected arm.  · Drink plenty of water. Staying hydrated  helps keep clots from forming.  Prevent infection with good hand hygiene  A PICC can let germs into your body. This can lead to serious and sometimes deadly infections. To prevent infection, its very important that you, your caregivers, and others around you use good hand hygiene. This means washing your hands well with soap and water, and cleaning them with an alcohol-based hand gel as directed. Never touch the PICC or dressing without first using one of these methods.  To wash your hands with soap and water:  · Wet your hands with warm water. (Avoid hot water, which can cause skin irritation when you wash your hands often.)  · Apply enough soap to cover the whole surface of your hands, including your fingers.  · Rub your hands together vigorously for at least 15 seconds. Make sure to rub the front and back of each hand up to the wrist, your fingers and fingernails, between the fingers, and each thumb.  · Rinse your hands with warm water.  · Dry your hands completely with a new, unused paper towel. Dont use a cloth towel or other reusable towel. These can harbor germs.  · Use the paper towel to turn off the faucet, then throw it away. If youre in a bathroom, also use a paper towel to open the door instead of touching the handle.  When you dont have access to soap and water:  Use an alcohol-based hand gel to clean your hands. The gel should have at least 60% alcohol. Follow the instructions on the package. Your healthcare team can answer any questions you have about when to use hand gel, or when its better to wash with soap and water.   When to seek medical care  Call your provider right away if you have any of the following:  · Pain or burning in your shoulder, chest, back, arm, or leg  · Fever of 100.4°F (38.0°C) or higher  · Chills  · Signs of infection at the catheter site (pain, redness, drainage, burning, or stinging)  · Coughing, wheezing, or shortness of breath  · A racing or irregular  heartbeat  · Muscle stiffness or trouble moving  · Tightness in your arm, above the catheter site  · Gurgling noises coming from the catheter  · The catheter falls out, breaks, cracks, leaks, or has other damage   Date Last Reviewed: 7/1/2016  © 6815-0156 [employer]. [address] [place] [address], [place] [address]. All rights reserved. This information is not intended as a substitute for professional medical care. Always follow your healthcare professional's instructions.

## 2020-08-10 ENCOUNTER — LAB VISIT (OUTPATIENT)
Dept: LAB | Facility: HOSPITAL | Age: 55
End: 2020-08-10
Attending: PODIATRIST
Payer: COMMERCIAL

## 2020-08-10 DIAGNOSIS — E11.621 DIABETIC FOOT ULCER WITH OSTEOMYELITIS: ICD-10-CM

## 2020-08-10 DIAGNOSIS — M86.172 ACUTE OSTEOMYELITIS OF LEFT ANKLE OR FOOT: Primary | ICD-10-CM

## 2020-08-10 DIAGNOSIS — L97.509 DIABETIC FOOT ULCER WITH OSTEOMYELITIS: ICD-10-CM

## 2020-08-10 DIAGNOSIS — M86.9 DIABETIC FOOT ULCER WITH OSTEOMYELITIS: ICD-10-CM

## 2020-08-10 DIAGNOSIS — E11.69 DIABETIC FOOT ULCER WITH OSTEOMYELITIS: ICD-10-CM

## 2020-08-10 LAB
ALBUMIN SERPL BCP-MCNC: 2.2 G/DL (ref 3.5–5.2)
ALP SERPL-CCNC: 323 U/L (ref 55–135)
ALT SERPL W/O P-5'-P-CCNC: 67 U/L (ref 10–44)
ANION GAP SERPL CALC-SCNC: 8 MMOL/L (ref 8–16)
AST SERPL-CCNC: 84 U/L (ref 10–40)
BASOPHILS # BLD AUTO: 0.02 K/UL (ref 0–0.2)
BASOPHILS NFR BLD: 0.4 % (ref 0–1.9)
BILIRUB SERPL-MCNC: 0.3 MG/DL (ref 0.1–1)
BUN SERPL-MCNC: 41 MG/DL (ref 6–20)
CALCIUM SERPL-MCNC: 7.5 MG/DL (ref 8.7–10.5)
CHLORIDE SERPL-SCNC: 100 MMOL/L (ref 95–110)
CK SERPL-CCNC: 43 U/L (ref 20–200)
CO2 SERPL-SCNC: 25 MMOL/L (ref 23–29)
CREAT SERPL-MCNC: 3.4 MG/DL (ref 0.5–1.4)
CRP SERPL-MCNC: 19.1 MG/L (ref 0–8.2)
DIFFERENTIAL METHOD: ABNORMAL
EOSINOPHIL # BLD AUTO: 0.2 K/UL (ref 0–0.5)
EOSINOPHIL NFR BLD: 3 % (ref 0–8)
ERYTHROCYTE [DISTWIDTH] IN BLOOD BY AUTOMATED COUNT: 13.2 % (ref 11.5–14.5)
ERYTHROCYTE [SEDIMENTATION RATE] IN BLOOD BY WESTERGREN METHOD: 130 MM/HR (ref 0–10)
EST. GFR  (AFRICAN AMERICAN): 22 ML/MIN/1.73 M^2
EST. GFR  (NON AFRICAN AMERICAN): 19 ML/MIN/1.73 M^2
GLUCOSE SERPL-MCNC: 233 MG/DL (ref 70–110)
HCT VFR BLD AUTO: 27.6 % (ref 40–54)
HGB BLD-MCNC: 8.5 G/DL (ref 14–18)
IMM GRANULOCYTES # BLD AUTO: 0.07 K/UL (ref 0–0.04)
IMM GRANULOCYTES NFR BLD AUTO: 1.3 % (ref 0–0.5)
LYMPHOCYTES # BLD AUTO: 1.1 K/UL (ref 1–4.8)
LYMPHOCYTES NFR BLD: 20.2 % (ref 18–48)
MCH RBC QN AUTO: 26.1 PG (ref 27–31)
MCHC RBC AUTO-ENTMCNC: 30.8 G/DL (ref 32–36)
MCV RBC AUTO: 85 FL (ref 82–98)
MONOCYTES # BLD AUTO: 0.5 K/UL (ref 0.3–1)
MONOCYTES NFR BLD: 10.3 % (ref 4–15)
NEUTROPHILS # BLD AUTO: 3.4 K/UL (ref 1.8–7.7)
NEUTROPHILS NFR BLD: 64.8 % (ref 38–73)
NRBC BLD-RTO: 0 /100 WBC
PLATELET # BLD AUTO: 186 K/UL (ref 150–350)
PMV BLD AUTO: 9.2 FL (ref 9.2–12.9)
POTASSIUM SERPL-SCNC: 4.4 MMOL/L (ref 3.5–5.1)
PROT SERPL-MCNC: 6 G/DL (ref 6–8.4)
RBC # BLD AUTO: 3.26 M/UL (ref 4.6–6.2)
SODIUM SERPL-SCNC: 133 MMOL/L (ref 136–145)
WBC # BLD AUTO: 5.25 K/UL (ref 3.9–12.7)

## 2020-08-10 PROCEDURE — 80053 COMPREHEN METABOLIC PANEL: CPT

## 2020-08-10 PROCEDURE — 85025 COMPLETE CBC W/AUTO DIFF WBC: CPT

## 2020-08-10 PROCEDURE — 86140 C-REACTIVE PROTEIN: CPT

## 2020-08-10 PROCEDURE — 82550 ASSAY OF CK (CPK): CPT

## 2020-08-10 PROCEDURE — 85652 RBC SED RATE AUTOMATED: CPT

## 2020-08-12 ENCOUNTER — OFFICE VISIT (OUTPATIENT)
Dept: INFECTIOUS DISEASES | Facility: CLINIC | Age: 55
End: 2020-08-12
Payer: COMMERCIAL

## 2020-08-12 DIAGNOSIS — M86.672 CHRONIC OSTEOMYELITIS OF LEFT FOOT: Primary | ICD-10-CM

## 2020-08-12 DIAGNOSIS — A49.8 ANAEROBIC BACTERIAL INFECTION: ICD-10-CM

## 2020-08-12 DIAGNOSIS — Z79.2 RECEIVING INTRAVENOUS ANTIBIOTIC TREATMENT AS OUTPATIENT: ICD-10-CM

## 2020-08-12 DIAGNOSIS — A49.8 E COLI INFECTION: ICD-10-CM

## 2020-08-12 PROCEDURE — 99213 PR OFFICE/OUTPT VISIT, EST, LEVL III, 20-29 MIN: ICD-10-PCS | Mod: 95,,, | Performed by: INTERNAL MEDICINE

## 2020-08-12 PROCEDURE — 99213 OFFICE O/P EST LOW 20 MIN: CPT | Mod: 95,,, | Performed by: INTERNAL MEDICINE

## 2020-08-12 NOTE — PROGRESS NOTES
Subjective:      Patient ID: Catalino Mcfadden is a 55 y.o. male.    Chief Complaint:Hospital Follow Up    The patient location is: home  The chief complaint leading to consultation is: hospital follow up & OPAT    Visit type: audiovisual    Face to Face time with patient: 15  30 minutes of total time spent on the encounter, which includes face to face time and non-face to face time preparing to see the patient (eg, review of tests), Obtaining and/or reviewing separately obtained history, Documenting clinical information in the electronic or other health record, Independently interpreting results (not separately reported) and communicating results to the patient/family/caregiver, or Care coordination (not separately reported).         Each patient to whom he or she provides medical services by telemedicine is:  (1) informed of the relationship between the physician and patient and the respective role of any other health care provider with respect to management of the patient; and (2) notified that he or she may decline to receive medical services by telemedicine and may withdraw from such care at any time.    History of Present Illness    A 55-year-old man with HTN, DM, CKD 3, osteomyelitis of the 5th metatarsal who is seen as a hospital follow up. Mr. Mcfadden underwent wound cultures which were positive for Myroides and Peptostreptococcus; and bone cultures positive for Peptostreptococcus, S viridans, and E coli. He was discharged from [facility] on a 6 week course of Zosyn. Pathology report now confirms chronic osteomyelitis. He has issues with PICC which were resolved with cath bartolo in the ED. He has been tolerating treatment well without adverse effects. He is following with Dr. Martinez this week.     Review of Systems   Constitution: Negative for chills, decreased appetite, fever, malaise/fatigue, night sweats, weight gain and weight loss.   HENT: Negative for congestion, ear pain, hearing loss, hoarse voice, sore  throat and tinnitus.    Eyes: Negative for blurred vision, redness and visual disturbance.   Cardiovascular: Negative for chest pain, leg swelling and palpitations.   Respiratory: Negative for cough, hemoptysis, shortness of breath and sputum production.    Hematologic/Lymphatic: Negative for adenopathy. Does not bruise/bleed easily.   Skin: Negative for dry skin, itching, rash and suspicious lesions.   Musculoskeletal: Negative for back pain, joint pain, myalgias and neck pain.   Gastrointestinal: Negative for abdominal pain, constipation, diarrhea, heartburn, nausea and vomiting.   Genitourinary: Negative for dysuria, flank pain, frequency, hematuria, hesitancy and urgency.   Neurological: Negative for dizziness, headaches, numbness, paresthesias and weakness.   Psychiatric/Behavioral: Negative for depression and memory loss. The patient does not have insomnia and is not nervous/anxious.      Objective:   Physical Exam  Vitals signs and nursing note reviewed.   Constitutional:       Appearance: He is well-developed.   HENT:      Head: Normocephalic and atraumatic.   Eyes:      General: No scleral icterus.        Right eye: No discharge.         Left eye: No discharge.      Conjunctiva/sclera: Conjunctivae normal.   Pulmonary:      Effort: Pulmonary effort is normal.   Musculoskeletal: Normal range of motion.      Comments: Left foot wrapped in football dressing.    Skin:     General: Skin is warm and dry.   Neurological:      Mental Status: He is alert and oriented to person, place, and time.   Psychiatric:         Behavior: Behavior normal.         Thought Content: Thought content normal.         Judgment: Judgment normal.       Assessment:       1. Chronic osteomyelitis of left foot    2. Anaerobic bacterial infection    3. E coli infection    4. Receiving intravenous antibiotic treatment as outpatient          Plan:   Polymicrobial chronic osteomyelitis now confirmed on pathology report. Tolerating antibiotics  thus far. CRP down trending although ESR fluctuating.   · Continue Zosyn.   · EOC: 9/1/20  · Routine labs.  · May need to consider oral antibiotic suppression if wound not fully healed.   · RTC in 2-3 weeks.

## 2020-08-13 ENCOUNTER — SSC ENCOUNTER (OUTPATIENT)
Dept: ADMINISTRATIVE | Facility: OTHER | Age: 55
End: 2020-08-13

## 2020-08-13 NOTE — PROGRESS NOTES
Please note that patient was called on 8/13/20 regarding the OPCM referral. Patient was advised to call insurance and request case management if interested. [facility] is unable to follow patient at this time.    Mr. Mcfadden states that the situation/reason for CM referral has been resolved.     Please contact [facility] with any question at Ext [phone].      Thank you,  Ranjana Anthony, [facility]  [phone]

## 2020-08-14 ENCOUNTER — TELEPHONE (OUTPATIENT)
Dept: WOUND CARE | Facility: HOSPITAL | Age: 55
End: 2020-08-14

## 2020-08-14 ENCOUNTER — HOSPITAL ENCOUNTER (OUTPATIENT)
Dept: WOUND CARE | Facility: HOSPITAL | Age: 55
Discharge: HOME | End: 2020-08-14
Attending: PODIATRIST
Payer: COMMERCIAL

## 2020-08-14 VITALS — DIASTOLIC BLOOD PRESSURE: 83 MMHG | SYSTOLIC BLOOD PRESSURE: 152 MMHG | TEMPERATURE: 99 F | HEART RATE: 81 BPM

## 2020-08-14 DIAGNOSIS — M86.9 OSTEOMYELITIS OF LEFT FOOT: ICD-10-CM

## 2020-08-14 DIAGNOSIS — L97.426 DIABETIC ULCER OF LEFT MIDFOOT ASSOCIATED WITH TYPE 2 DIABETES MELLITUS, WITH BONE INVOLVEMENT WITHOUT EVIDENCE OF NECROSIS: Primary | ICD-10-CM

## 2020-08-14 DIAGNOSIS — E11.621 DIABETIC ULCER OF LEFT MIDFOOT ASSOCIATED WITH TYPE 2 DIABETES MELLITUS, WITH BONE INVOLVEMENT WITHOUT EVIDENCE OF NECROSIS: Primary | ICD-10-CM

## 2020-08-14 PROCEDURE — 11042 DBRDMT SUBQ TIS 1ST 20SQCM/<: CPT | Mod: ,,, | Performed by: PODIATRIST

## 2020-08-14 PROCEDURE — 99499 NO LOS: ICD-10-PCS | Mod: ,,, | Performed by: PODIATRIST

## 2020-08-14 PROCEDURE — 99499 UNLISTED E&M SERVICE: CPT | Mod: ,,, | Performed by: PODIATRIST

## 2020-08-14 PROCEDURE — 11042 WOUND DEBRIDEMENT: ICD-10-PCS | Mod: ,,, | Performed by: PODIATRIST

## 2020-08-14 NOTE — PROGRESS NOTES
[facility]                Progress Note    Subjective:       Patient ID: Catalino Mcfadden is a 55 y.o. male.    Chief Complaint: No chief complaint on file.    Patient ambulated to exam bed without assistance or pain. Dressing removed had large amount of wet drainage and only gauze dressing per Home Health who claims that the orders did not include Iodosorb that was placed in hospital. Suture removed in clinic. NPWT equipment not received as patient was in the hospital. Supposedly sent to hospital by [facility] but was never received. Will call [facility] today to track down and have sent to patient's house.       Review of Systems   Constitutional: Negative for appetite change, chills and fever.   Respiratory: Negative for cough, shortness of breath and wheezing.    Cardiovascular: Positive for leg swelling. Negative for chest pain.   Gastrointestinal: Negative for diarrhea, nausea and vomiting.   Musculoskeletal: Positive for arthralgias, joint swelling and myalgias.   Skin: Positive for wound.   Neurological: Positive for numbness and headaches. Negative for weakness.        + paresthesia          Objective:        Physical Exam      Vitals:    08/14/20 0910   BP: (!) 152/83   Pulse: 81   Temp: 98.5 °F (36.9 °C)       Assessment:           ICD-10-CM ICD-9-CM   1. Diabetic ulcer of left midfoot associated with type 2 diabetes mellitus, with bone involvement without evidence of necrosis  E11.621 250.80    L97.426 707.14   2. Osteomyelitis of left foot  M86.9 730.27            Incision/Site 07/17/20 1251 Left Foot (Active)   07/17/20 1251   Present Prior to Hospital Arrival?:    Side: Left   Location: Foot   Orientation:    Incision Type:    Closure Method: Other (see comments)   Additional Comments:  open wound .betadine soak in 4x4's, abd, ace, castpadding, surgical boot   Removal Indication and Assessment:    Wound Outcome:    Removal Indications:    Wound Image   08/14/20 0900    Incision WDL ex 08/14/20 0900   Dressing Appearance Moist drainage 08/14/20 0900   Drainage Amount Large 08/14/20 0900   Drainage Characteristics/Odor Serosanguineous 08/14/20 0900   Appearance Red;Yellow;Slough;Sutures intact 08/14/20 0900   Black (%), Wound Tissue Color 0 % 08/14/20 0900   Red (%), Wound Tissue Color 95 % 08/14/20 0900   Yellow (%), Wound Tissue Color 5 % 08/14/20 0900   Periwound Area Macerated 08/14/20 0900   Wound Edges Open 08/14/20 0900   Wound Length (cm) 3.6 cm 08/14/20 0900   Wound Width (cm) 4 cm 08/14/20 0900   Wound Depth (cm) 0.6 cm 08/14/20 0900   Wound Volume (cm^3) 8.64 cm^3 08/14/20 0900   Wound Surface Area (cm^2) 14.4 cm^2 08/14/20 0900   Undermining (depth (cm)/location) 1 cm 7-12 o'clock 08/14/20 0900   Care Cleansed with:;Soap and water;Sterile normal saline 08/14/20 0900   Dressing Changed 08/14/20 0900   Off Loading Football dressing 08/14/20 0900           Plan:            Greater than 50% of this visit spent on counseling and coordination of care.    Education about the prevention of limb loss.    Discussed wound healing cycle, skin integrity, ways to care for skin.Counseled patient on the effects of blood glucose on healing. He verbalizes understanding that it can increase the chances of delayed healing and this prolonged exposure leads to infection or progression of infection which subsequently can result in loss of limb.    The wound is cleansed of foreign material as much as possible and the base inspected for bone or abscess.  Base is fibrogranular and hypergranular with bone no longer palpable to wound bed.  Patient already evaluated and treated by Infectious Disease with plans for Zosyn until September.  PICC line in place.      Debridement note below    Plan for application of wound VAC next week    Wound Debridement    Date/Time: 8/14/2020 10:00 AM  Performed by: Aviva Martinez DPM  Authorized by: Aviva Martinez DPM     Time out: Immediately prior to procedure a  ""time out" was called to verify the correct patient, procedure, equipment, support staff and site/side marked as required.    Consent Done?:  Yes (Written)    Preparation: Patient was prepped and draped in usual sterile fashion    Local anesthesia used?: No      Wound Details:    Location:  Left foot    Location:  Left Midfoot    Type of Debridement:  Excisional       Length (cm):  3.6       Area (sq cm):  14.4       Width (cm):  4       Percent Debrided (%):  100       Depth (cm):  0.6       Total Area Debrided (sq cm):  14.4    Depth of debridement:  Subcutaneous tissue    Tissue debrided:  Dermis, Epidermis, Subcutaneous and Hypergranulation    Devitalized tissue debrided:  Callus and Fibrin    Instruments:  Curette    Bleeding:  Minimal  Hemostasis Achieved: Yes    Method Used:  Pressure  Patient tolerance:  Patient tolerated the procedure well with no immediate complications      Orders Placed This Encounter   Procedures    HOME HEALTH ORDERS     Subsequent Home Health Orders    Current Medications:  Current Outpatient Medications:  amLODIPine (NORVASC) 10 MG tablet, Take 1 tablet (10 mg total) by mouth once daily., Disp: 90 tablet, Rfl: 3  atorvastatin (LIPITOR) 40 MG tablet, Take 1 tablet (40 mg total) by mouth once daily., Disp: 90 tablet, Rfl: 3  blood sugar diagnostic Strp, 1 strip by Misc.(Non-Drug; Combo Route) route 3 (three) times daily. Patient needs One Touch Test Strips (Berio)., Disp: 100 strip, Rfl: 2  carvediloL (COREG) 25 MG tablet, Take 1 tablet (25 mg total) by mouth 2 (two) times daily., Disp: 60 tablet, Rfl: 11  furosemide (LASIX) 40 MG tablet, Take 1 tablet (40 mg total) by mouth once daily., Disp: 30 tablet, Rfl: 0  gabapentin (NEURONTIN) 300 MG capsule, Take 1 capsule (300 mg total) by mouth 2 (two) times daily., Disp: 60 capsule, Rfl: 11  hydrALAZINE (APRESOLINE) 100 MG tablet, Take 1 tablet (100 mg total) by mouth every 8 (eight) hours., Disp: 90 tablet, Rfl: 11  insulin aspart U-100 " "(NOVOLOG) 100 unit/mL (3 mL) InPn pen, Inject 5 Units into the skin 3 (three) times daily with meals., Disp: 13.5 mL, Rfl: 3  insulin detemir U-100 (LEVEMIR FLEXTOUCH) 100 unit/mL (3 mL) SubQ InPn pen, Inject 10 Units into the skin every evening., Disp: 9 mL, Rfl: 3  pen needle, diabetic 32 gauge x 3/16" Ndle, 1 each by Misc.(Non-Drug; Combo Route) route 5 (five) times daily., Disp: 100 each, Rfl: 10  piperacillin sodium/tazobactam (PIPERACILLIN-TAZOBACTAM 4.5G/100ML SODIUM CHLORIDE 0.9%-READY TO MIX), Inject 100 mLs (4.5 g total) into the vein every 12 (twelve) hours., Disp:  , Rfl:   pulse oximeter (PULSE OXIMETER) device, by Apply Externally route 2 (two) times a day. Use twice daily at 8 AM and 3 PM and record the value in MyChart as directed. (Patient not taking: Reported on 8/9/2020), Disp: 1 each, Rfl: 0    No current facility-administered medications for this encounter.       Nursing:   Home Health to see on Tuesday 18th 2020 will return to see Dr in clinic on Friday 21st August.   Wash wound with Voshe and let dry,paint around wound tightly with gentian violet, Iodoflex to wound base,2 marycarmen foams over wound, x 3 cast padding football and calamine coflex. Boot to be worn.     Order Specific Question:   What Home Health Agency is the patient currently using?     Answer:   [facility]    Change dressing     Wound dressing changed by Dr Martinez.  Fenestared Pomogram, iodoflex, Hydrafera Blue Transfer, long and short marycarmen foams, x 5 cast padding and 3 three inch and 3 six inch elastic bandage to make Martinez dressing.        Follow up in about 1 week (around 8/21/2020).       "

## 2020-08-17 ENCOUNTER — LAB VISIT (OUTPATIENT)
Dept: LAB | Facility: HOSPITAL | Age: 55
End: 2020-08-17
Attending: FAMILY MEDICINE
Payer: COMMERCIAL

## 2020-08-17 DIAGNOSIS — E11.621 DIABETIC FOOT ULCER WITH OSTEOMYELITIS: Primary | ICD-10-CM

## 2020-08-17 DIAGNOSIS — E11.69 DIABETIC FOOT ULCER WITH OSTEOMYELITIS: Primary | ICD-10-CM

## 2020-08-17 DIAGNOSIS — L97.509 DIABETIC FOOT ULCER WITH OSTEOMYELITIS: Primary | ICD-10-CM

## 2020-08-17 DIAGNOSIS — M86.9 DIABETIC FOOT ULCER WITH OSTEOMYELITIS: Primary | ICD-10-CM

## 2020-08-17 DIAGNOSIS — E11.69 DIABETES MELLITUS ASSOCIATED WITH HORMONAL ETIOLOGY: ICD-10-CM

## 2020-08-17 DIAGNOSIS — M86.172 ACUTE OSTEOMYELITIS OF LEFT ANKLE OR FOOT: ICD-10-CM

## 2020-08-17 LAB
ALBUMIN SERPL BCP-MCNC: 2.2 G/DL (ref 3.5–5.2)
ALP SERPL-CCNC: 320 U/L (ref 55–135)
ALT SERPL W/O P-5'-P-CCNC: 37 U/L (ref 10–44)
ANION GAP SERPL CALC-SCNC: 8 MMOL/L (ref 8–16)
AST SERPL-CCNC: 44 U/L (ref 10–40)
BASOPHILS # BLD AUTO: 0.04 K/UL (ref 0–0.2)
BASOPHILS NFR BLD: 0.5 % (ref 0–1.9)
BILIRUB SERPL-MCNC: 0.4 MG/DL (ref 0.1–1)
BUN SERPL-MCNC: 38 MG/DL (ref 6–20)
CALCIUM SERPL-MCNC: 7.7 MG/DL (ref 8.7–10.5)
CHLORIDE SERPL-SCNC: 100 MMOL/L (ref 95–110)
CO2 SERPL-SCNC: 25 MMOL/L (ref 23–29)
CREAT SERPL-MCNC: 3.3 MG/DL (ref 0.5–1.4)
CRP SERPL-MCNC: 15.2 MG/L (ref 0–8.2)
DIFFERENTIAL METHOD: ABNORMAL
EOSINOPHIL # BLD AUTO: 0.2 K/UL (ref 0–0.5)
EOSINOPHIL NFR BLD: 2.7 % (ref 0–8)
ERYTHROCYTE [DISTWIDTH] IN BLOOD BY AUTOMATED COUNT: 13.8 % (ref 11.5–14.5)
ERYTHROCYTE [SEDIMENTATION RATE] IN BLOOD BY WESTERGREN METHOD: 125 MM/HR (ref 0–10)
EST. GFR  (AFRICAN AMERICAN): 23 ML/MIN/1.73 M^2
EST. GFR  (NON AFRICAN AMERICAN): 20 ML/MIN/1.73 M^2
FUNGUS SPEC CULT: NORMAL
GLUCOSE SERPL-MCNC: 249 MG/DL (ref 70–110)
HCT VFR BLD AUTO: 31.3 % (ref 40–54)
HGB BLD-MCNC: 9.6 G/DL (ref 14–18)
IMM GRANULOCYTES # BLD AUTO: 0.13 K/UL (ref 0–0.04)
IMM GRANULOCYTES NFR BLD AUTO: 1.7 % (ref 0–0.5)
LYMPHOCYTES # BLD AUTO: 1.2 K/UL (ref 1–4.8)
LYMPHOCYTES NFR BLD: 15.8 % (ref 18–48)
MCH RBC QN AUTO: 26.2 PG (ref 27–31)
MCHC RBC AUTO-ENTMCNC: 30.7 G/DL (ref 32–36)
MCV RBC AUTO: 85 FL (ref 82–98)
MONOCYTES # BLD AUTO: 0.7 K/UL (ref 0.3–1)
MONOCYTES NFR BLD: 9.4 % (ref 4–15)
NEUTROPHILS # BLD AUTO: 5.2 K/UL (ref 1.8–7.7)
NEUTROPHILS NFR BLD: 69.9 % (ref 38–73)
NRBC BLD-RTO: 0 /100 WBC
PLATELET # BLD AUTO: 325 K/UL (ref 150–350)
PMV BLD AUTO: 9.5 FL (ref 9.2–12.9)
POTASSIUM SERPL-SCNC: 5.3 MMOL/L (ref 3.5–5.1)
PROT SERPL-MCNC: 6.4 G/DL (ref 6–8.4)
RBC # BLD AUTO: 3.67 M/UL (ref 4.6–6.2)
SODIUM SERPL-SCNC: 133 MMOL/L (ref 136–145)
WBC # BLD AUTO: 7.45 K/UL (ref 3.9–12.7)

## 2020-08-17 PROCEDURE — 86140 C-REACTIVE PROTEIN: CPT

## 2020-08-17 PROCEDURE — 85652 RBC SED RATE AUTOMATED: CPT

## 2020-08-17 PROCEDURE — 85025 COMPLETE CBC W/AUTO DIFF WBC: CPT

## 2020-08-17 PROCEDURE — 80053 COMPREHEN METABOLIC PANEL: CPT

## 2020-08-18 ENCOUNTER — DOCUMENT SCAN (OUTPATIENT)
Dept: HOME HEALTH SERVICES | Facility: HOSPITAL | Age: 55
End: 2020-08-18

## 2020-08-18 ENCOUNTER — DOCUMENT SCAN (OUTPATIENT)
Dept: HOME HEALTH SERVICES | Facility: HOSPITAL | Age: 55
End: 2020-08-18
Payer: COMMERCIAL

## 2020-08-18 NOTE — TELEPHONE ENCOUNTER
----- Message from Vicki Johnson sent at 8/18/2020  2:12 PM CDT -----  Contact: Self [phone]  Type: RX Refill Request    Who Called: Self    Have you contacted your pharmacy: yes    Refill or New Rx: refill    RX Name and Strength: blood sugar diagnostic Strp    Preferred Pharmacy with phone number:   [id number] [place] [facility] [address] [place] [address] [address]  [place] [address]  Phone: [phone] Fax: [phone]    Local or Mail Order: Local    Would the patient rather a call back or a response via My [facility]? Call back    Best Call Back Number: [phone]

## 2020-08-19 ENCOUNTER — PATIENT OUTREACH (OUTPATIENT)
Dept: ADMINISTRATIVE | Facility: OTHER | Age: 55
End: 2020-08-19

## 2020-08-19 ENCOUNTER — OFFICE VISIT (OUTPATIENT)
Dept: FAMILY MEDICINE | Facility: CLINIC | Age: 55
End: 2020-08-19
Payer: COMMERCIAL

## 2020-08-19 VITALS
DIASTOLIC BLOOD PRESSURE: 86 MMHG | RESPIRATION RATE: 16 BRPM | TEMPERATURE: 98 F | HEART RATE: 85 BPM | BODY MASS INDEX: 36.81 KG/M2 | OXYGEN SATURATION: 96 % | SYSTOLIC BLOOD PRESSURE: 138 MMHG | WEIGHT: 277.75 LBS | HEIGHT: 73 IN

## 2020-08-19 DIAGNOSIS — E11.22 TYPE 2 DIABETES MELLITUS WITH STAGE 4 CHRONIC KIDNEY DISEASE, WITH LONG-TERM CURRENT USE OF INSULIN: ICD-10-CM

## 2020-08-19 DIAGNOSIS — L97.526 DIABETIC ULCER OF TOE OF LEFT FOOT ASSOCIATED WITH TYPE 2 DIABETES MELLITUS, WITH BONE INVOLVEMENT WITHOUT EVIDENCE OF NECROSIS: ICD-10-CM

## 2020-08-19 DIAGNOSIS — E11.621 DIABETIC ULCER OF TOE OF LEFT FOOT ASSOCIATED WITH TYPE 2 DIABETES MELLITUS, WITH BONE INVOLVEMENT WITHOUT EVIDENCE OF NECROSIS: ICD-10-CM

## 2020-08-19 DIAGNOSIS — I10 ESSENTIAL HYPERTENSION: Primary | Chronic | ICD-10-CM

## 2020-08-19 DIAGNOSIS — N18.4 TYPE 2 DIABETES MELLITUS WITH STAGE 4 CHRONIC KIDNEY DISEASE, WITH LONG-TERM CURRENT USE OF INSULIN: ICD-10-CM

## 2020-08-19 DIAGNOSIS — Z79.4 TYPE 2 DIABETES MELLITUS WITH STAGE 4 CHRONIC KIDNEY DISEASE, WITH LONG-TERM CURRENT USE OF INSULIN: ICD-10-CM

## 2020-08-19 DIAGNOSIS — E88.09 HYPOALBUMINEMIA: ICD-10-CM

## 2020-08-19 PROBLEM — J96.01 ACUTE RESPIRATORY FAILURE WITH HYPOXIA: Status: RESOLVED | Noted: 2020-08-03 | Resolved: 2020-08-19

## 2020-08-19 PROBLEM — N17.9 ACUTE KIDNEY INJURY SUPERIMPOSED ON CHRONIC KIDNEY DISEASE: Status: RESOLVED | Noted: 2020-07-16 | Resolved: 2020-08-19

## 2020-08-19 PROBLEM — N18.9 ACUTE KIDNEY INJURY SUPERIMPOSED ON CHRONIC KIDNEY DISEASE: Status: RESOLVED | Noted: 2020-07-16 | Resolved: 2020-08-19

## 2020-08-19 PROCEDURE — 99999 PR PBB SHADOW E&M-EST. PATIENT-LVL IV: ICD-10-PCS | Mod: PBBFAC,,, | Performed by: FAMILY MEDICINE

## 2020-08-19 PROCEDURE — 3079F DIAST BP 80-89 MM HG: CPT | Mod: CPTII,S$GLB,, | Performed by: FAMILY MEDICINE

## 2020-08-19 PROCEDURE — 3008F PR BODY MASS INDEX (BMI) DOCUMENTED: ICD-10-PCS | Mod: CPTII,S$GLB,, | Performed by: FAMILY MEDICINE

## 2020-08-19 PROCEDURE — 3075F PR MOST RECENT SYSTOLIC BLOOD PRESS GE 130-139MM HG: ICD-10-PCS | Mod: CPTII,S$GLB,, | Performed by: FAMILY MEDICINE

## 2020-08-19 PROCEDURE — 3079F PR MOST RECENT DIASTOLIC BLOOD PRESSURE 80-89 MM HG: ICD-10-PCS | Mod: CPTII,S$GLB,, | Performed by: FAMILY MEDICINE

## 2020-08-19 PROCEDURE — 99215 PR OFFICE/OUTPT VISIT, EST, LEVL V, 40-54 MIN: ICD-10-PCS | Mod: S$GLB,,, | Performed by: FAMILY MEDICINE

## 2020-08-19 PROCEDURE — 3052F HG A1C>EQUAL 8.0%<EQUAL 9.0%: CPT | Mod: CPTII,S$GLB,, | Performed by: FAMILY MEDICINE

## 2020-08-19 PROCEDURE — 99215 OFFICE O/P EST HI 40 MIN: CPT | Mod: S$GLB,,, | Performed by: FAMILY MEDICINE

## 2020-08-19 PROCEDURE — 3075F SYST BP GE 130 - 139MM HG: CPT | Mod: CPTII,S$GLB,, | Performed by: FAMILY MEDICINE

## 2020-08-19 PROCEDURE — 3052F PR MOST RECENT HEMOGLOBIN A1C LEVEL 8.0 - < 9.0%: ICD-10-PCS | Mod: CPTII,S$GLB,, | Performed by: FAMILY MEDICINE

## 2020-08-19 PROCEDURE — 3008F BODY MASS INDEX DOCD: CPT | Mod: CPTII,S$GLB,, | Performed by: FAMILY MEDICINE

## 2020-08-19 PROCEDURE — 99999 PR PBB SHADOW E&M-EST. PATIENT-LVL IV: CPT | Mod: PBBFAC,,, | Performed by: FAMILY MEDICINE

## 2020-08-19 RX ORDER — LISINOPRIL 20 MG/1
TABLET ORAL
Qty: 90 TABLET | Refills: 0 | OUTPATIENT
Start: 2020-08-19

## 2020-08-19 RX ORDER — DULAGLUTIDE 0.75 MG/.5ML
INJECTION, SOLUTION SUBCUTANEOUS
Qty: 2 ML | Refills: 5 | Status: SHIPPED | OUTPATIENT
Start: 2020-08-19 | End: 2020-09-08 | Stop reason: SDUPTHER

## 2020-08-19 RX ORDER — DILTIAZEM HYDROCHLORIDE 240 MG/1
CAPSULE, COATED, EXTENDED RELEASE ORAL
Qty: 90 CAPSULE | Refills: 0 | OUTPATIENT
Start: 2020-08-19

## 2020-08-19 RX ORDER — CHLORTHALIDONE 25 MG/1
TABLET ORAL
Qty: 90 TABLET | Refills: 0 | OUTPATIENT
Start: 2020-08-19

## 2020-08-19 RX ORDER — INSULIN GLARGINE 100 [IU]/ML
INJECTION, SOLUTION SUBCUTANEOUS
Status: ON HOLD | COMMUNITY
Start: 2020-07-24 | End: 2021-08-02 | Stop reason: HOSPADM

## 2020-08-19 NOTE — PROGRESS NOTES
Health Maintenance Due   Topic     Low Dose Statin  Consult with PCP     Shingles Vaccine (1 of 2) Consult with PCP     Eye Exam  Consult with PCP     Colorectal Cancer Screening  Consult with PCP

## 2020-08-19 NOTE — TELEPHONE ENCOUNTER
Spoke with patient was told I check with pharmacy DM supplies are ready for patient to  he has to pay $ 45.00 for strips also advise patient to call from pharmacy if it's any problems , patient verbalized understand .

## 2020-08-19 NOTE — PROGRESS NOTES
Health Maintenance Due   Topic Date Due    HIV Screening  04[birth year]04/1979    TETANUS VACCINE  04/04/1982    Shingles Vaccine (1 of 2) 04/04/2014    Eye Exam  04/03/2019    Colorectal Cancer Screening  06/03/2020     Updates were requested from care everywhere.  Chart was reviewed for overdue Proactive [facility]ters ([facility]IRGIL[facility]) topics (CRS, Breast Cancer Screening, Eye exam)  Health Maintenance has been updated.  LINKS immunization registry triggered.  Immunizations were reconciled.

## 2020-08-19 NOTE — PROGRESS NOTES
Chief Complaint   Patient presents with    Hospital Follow Up       Catalino Mcfadden is a 55 y.o. male who presents per the Chief Complaint.  Pt is known to me and was last seen by me on 7/30/2020.  All known chronic medical issues have been documented.    Family and/or Caretaker present at visit?  No.  Diagnostic tests reviewed/disposition: I have reviewed all completed as well as pending diagnostic tests at the time of discharge.  Disease/illness education: At Discharge  Home health/community services discussion/referrals: Patient has home health established at [facility].   Establishment or re-establishment of referral orders for community resources: No other necessary community resources.   Discussion with other health care providers: No discussion with other health care providers necessary.      Hypertension  This is a chronic problem. The current episode started more than 1 year ago. The problem is unchanged. The problem is controlled. Associated symptoms include headaches (occasional), neck pain (improved) and shortness of breath (resolved). Pertinent negatives include no anxiety, blurred vision, chest pain, palpitations, peripheral edema or sweats. There are no associated agents to hypertension. Risk factors for coronary artery disease include diabetes mellitus, dyslipidemia, male gender and obesity. Past treatments include beta blockers, calcium channel blockers, diuretics and ACE inhibitors. The current treatment provides moderate improvement. There are no compliance problems.  Hypertensive end-organ damage includes kidney disease and retinopathy. There is no history of angina, CAD/MI, CVA, heart failure, left ventricular hypertrophy or PVD. Identifiable causes of hypertension include chronic renal disease. There is no history of coarctation of the aorta, hyperaldosteronism, hypercortisolism, hyperparathyroidism, a hypertension causing med, pheochromocytoma, renovascular disease, sleep apnea or a thyroid problem.    Diabetes  He presents for his follow-up diabetic visit. He has type 2 diabetes mellitus. No MedicAlert identification noted. The initial diagnosis of diabetes was made 20 years ago. His disease course has been improving. Hypoglycemia symptoms include headaches (occasional). Pertinent negatives for hypoglycemia include no confusion, dizziness, hunger, mood changes, nervousness/anxiousness, pallor, seizures, sleepiness, speech difficulty, sweats or tremors. Associated symptoms include foot paresthesias. Pertinent negatives for diabetes include no blurred vision, no chest pain, no fatigue, no foot ulcerations, no polydipsia, no polyphagia, no polyuria, no visual change, no weakness and no weight loss. There are no hypoglycemic complications. Pertinent negatives for hypoglycemia complications include no blackouts, no hospitalization, no nocturnal hypoglycemia, no required assistance and no required glucagon injection. Symptoms are improving. Diabetic complications include nephropathy, peripheral neuropathy and retinopathy. Pertinent negatives for diabetic complications include no autonomic neuropathy, CVA, heart disease or PVD. Risk factors for coronary artery disease include hypertension, obesity, sedentary lifestyle and diabetes mellitus. Current diabetic treatment includes diet and oral agent (dual therapy). He is compliant with treatment all of the time. His weight is decreasing steadily. He is following a generally healthy, low fat/cholesterol and low salt diet. Meal planning includes avoidance of concentrated sweets. He has not had a previous visit with a dietitian. He participates in exercise intermittently. He monitors blood glucose at home 1-2 x per day. He monitors urine at home <1 x per month. Blood glucose monitoring compliance is fair. His home blood glucose trend is decreasing steadily. An ACE inhibitor/angiotensin II receptor blocker is being taken. He sees a podiatrist.Eye exam is not current.  "      ROS  Review of Systems   Constitutional: Negative for activity change, appetite change, chills, fatigue, fever, unexpected weight change and weight loss.   HENT: Negative for congestion, ear pain, hearing loss, postnasal drip, rhinorrhea, sinus pressure, sore throat and trouble swallowing.    Eyes: Negative for blurred vision, pain, discharge and visual disturbance.   Respiratory: Positive for chest tightness (resolved), shortness of breath (resolved) and wheezing (resolved). Negative for apnea, cough, choking and stridor.    Cardiovascular: Negative for chest pain and palpitations.   Gastrointestinal: Negative for abdominal pain, blood in stool, constipation, diarrhea, nausea and vomiting.   Endocrine: Negative for polydipsia, polyphagia and polyuria.   Genitourinary: Negative for difficulty urinating, dysuria, flank pain, frequency, hematuria, penile pain, penile swelling, scrotal swelling, testicular pain and urgency.   Musculoskeletal: Positive for arthralgias (bilateral toe injury) and neck pain (improved). Negative for back pain, joint swelling, myalgias and neck stiffness.   Skin: Negative.  Negative for pallor.   Allergic/Immunologic: Negative for environmental allergies, food allergies and immunocompromised state.   Neurological: Positive for headaches (occasional). Negative for dizziness, tremors, seizures, speech difficulty, weakness and light-headedness.   Psychiatric/Behavioral: Negative.  Negative for confusion and dysphoric mood. The patient is not nervous/anxious.        Physical Exam  Vitals:    08/19/20 1052   BP: 138/86   Pulse: 85   Resp: 16   Temp: 98.4 °F (36.9 °C)    Body mass index is 36.65 kg/m².  Weight: 126 kg (277 lb 12.5 oz)(with boot on left foot)   Height: 6' 1" (185.4 cm)     Physical Exam  Constitutional:       General: He is not in acute distress.     Appearance: Normal appearance. He is well-developed. He is not ill-appearing, toxic-appearing or diaphoretic.   HENT:      " Head: Normocephalic and atraumatic.        Right Ear: Hearing and external ear normal. No decreased hearing noted.      Left Ear: Hearing and external ear normal. No decreased hearing noted.      Nose: Nose normal. No nasal deformity or rhinorrhea.      Mouth/Throat:      Dentition: Normal dentition. Does not have dentures.      Pharynx: Uvula midline.   Eyes:      General: Lids are normal. No scleral icterus.        Right eye: No foreign body or discharge.         Left eye: No foreign body or discharge.      Conjunctiva/sclera: Conjunctivae normal.      Right eye: No chemosis or exudate.     Left eye: No chemosis or exudate.     Pupils: Pupils are equal, round, and reactive to light.   Neck:      Musculoskeletal: Full passive range of motion without pain, normal range of motion and neck supple.   Cardiovascular:      Rate and Rhythm: Normal rate and regular rhythm.      Heart sounds: Normal heart sounds, S1 normal and S2 normal. No murmur. No friction rub. No gallop.    Pulmonary:      Effort: Pulmonary effort is normal. No accessory muscle usage or respiratory distress.      Breath sounds: Normal breath sounds. No decreased breath sounds, wheezing, rhonchi or rales.   Abdominal:      General: There is no distension.      Palpations: Abdomen is soft. Abdomen is not rigid.      Tenderness: There is no abdominal tenderness. There is no guarding or rebound.   Musculoskeletal:      Left ankle: He exhibits decreased range of motion.      Cervical back: He exhibits tenderness and pain. He exhibits normal range of motion, no bony tenderness, no swelling, no edema, no deformity, no laceration, no spasm and normal pulse.        Feet:       Comments: Left foot in surgical boot   Skin:     General: Skin is warm and dry.      Findings: No rash.   Neurological:      Mental Status: He is alert and oriented to person, place, and time.      Cranial Nerves: No cranial nerve deficit.      Sensory: No sensory deficit.      Motor: No  abnormal muscle tone or seizure activity.      Coordination: Coordination normal.      Gait: Gait normal.   Psychiatric:         Attention and Perception: He is attentive.         Speech: Speech normal.         Behavior: Behavior normal. Behavior is cooperative.         Thought Content: Thought content normal.         Judgment: Judgment normal.       Assessment & Plan    Discussion of plan of care including treatment options regarding health and wellness were reviewed and discussed with patient.  Any changes to medication or treatment plan, as well as any screening blood test, imaging, or referrals to specialist, are documented.  Follow up as indicated.     1. Essential hypertension  Patient was counseled and encouraged to maintain a low sodium diet, as well as increasing physical activity.  Recommend random BP checks at home on a regular basis.  Repeat BP at end of visit was not necessary. Will continue medication at this time, and follow up in 3-6 months, or sooner if blood pressure begins to increase.  Will follow up with Cardiology to discuss medication changes.  Recent fluid overload likely related to IV fluid infusion and not cardiac.    2. Type 2 diabetes mellitus with stage 4 chronic kidney disease, with long-term current use of insulin  Patient is encouraged to follow a diet low in carbohydrates and simple sugars.  Discussed simple vs. complex carbohydrates as well as eating times of certain meals. Advised to focus on good food choices and increased physical activity and encouraged to adhere to medication regimen and/or lifestyle adjustments, and to check glucose level as recommended.  Contact office if glucose levels are not improving over time.  Will monitor HbA1c appropriately.  Patient advised to return to Trulicity use and discontinue insulin; patient states he never started insulin since leaving hospital.  Kidney function stabilizing though not improving significantly; patient does not want to see  Nephrology and is not a dialysis candidate at this time.    - dulaglutide (TRULICITY) 0.75 mg/0.5 mL pen injector; INJECT 0.5 ML UNDER THE SKIN EVERY 7 DAYS  Dispense: 2 mL; Refill: 5    3. Diabetic ulcer of toe of left foot associated with type 2 diabetes mellitus, with bone involvement without evidence of necrosis  Managed by Podiatry; PICC line in place.  Patient is still receiving IV antibiotics routinely.      4. Hypoalbuminemia  Likely associated with CKD stage 4; recommended oral supplements but advised this would not be sufficient to normalize levels.  If not improved with next blood test, will consider albumin infusion.       Follow up in about 2 months (around 10/19/2020) for Chronic Disease Management.      ACTIVE MEDICAL ISSUES:  Documented in Problem List    PAST MEDICAL HISTORY  Documented  .  PAST SURGICAL HISTORY:  Documented    SOCIAL HISTORY:  Documented    FAMILY HISTORY:  Documented    ALLERGIES AND MEDICATIONS: updated and reviewed.  Documented    Health Maintenance       Date Due Completion Date    HIV Screening 04/04/1979 ---    TETANUS VACCINE 04/04/1982 ---    Shingles Vaccine (1 of 2) 04/04/2014 ---    Eye Exam 04/03/2019 4/3/2018    Colorectal Cancer Screening 06/03/2020 6/3/2019    Override on 9/26/2014: Done (future)    Lipid Panel 08/28/2020 8/28/2019    Influenza Vaccine (1) 09/01/2020 3/10/2020    Hemoglobin A1c 10/17/2020 7/17/2020    Foot Exam 08/04/2021 8/4/2020    Override on 5/25/2020: Done    Override on 4/27/2020: Done    Override on 4/20/2020: Done    Low Dose Statin 08/19/2021 8/19/2020

## 2020-08-19 NOTE — TELEPHONE ENCOUNTER
----- Message from Tiffany Brandt sent at 8/19/2020  1:12 PM CDT -----  Type: Patient Call Back       What is the request in detail:  pt calling to speak to a nurse regarding his test strips      Can the clinic reply by [facility]? No       Would the patient rather a call back or a response via [facility]? Call back       Best call back number: [phone]

## 2020-08-20 ENCOUNTER — TELEPHONE (OUTPATIENT)
Dept: CARDIOLOGY | Facility: CLINIC | Age: 55
End: 2020-08-20

## 2020-08-20 NOTE — TELEPHONE ENCOUNTER
Tried to called pt to reschedule his appointment pt didn't answer I left a voicemail for him to give the office a call.     ----- Message from Laurent Etsrada sent at 8/20/2020  9:29 AM CDT -----  Regarding: self  Type: Patient Call Back    Who called: self    What is the request in detail: please contact the patient about a sooner appt. Next available is 08/31/20    Can the clinic reply by [facility]? no    Would the patient rather a call back or a response via [facility]? call    Best call back number:[phone]

## 2020-08-21 ENCOUNTER — HOSPITAL ENCOUNTER (OUTPATIENT)
Dept: WOUND CARE | Facility: HOSPITAL | Age: 55
Discharge: HOME | End: 2020-08-21
Attending: PODIATRIST
Payer: COMMERCIAL

## 2020-08-21 ENCOUNTER — TELEPHONE (OUTPATIENT)
Dept: FAMILY MEDICINE | Facility: CLINIC | Age: 55
End: 2020-08-21

## 2020-08-21 VITALS
HEART RATE: 81 BPM | TEMPERATURE: 97 F | SYSTOLIC BLOOD PRESSURE: 142 MMHG | RESPIRATION RATE: 20 BRPM | DIASTOLIC BLOOD PRESSURE: 78 MMHG

## 2020-08-21 DIAGNOSIS — E11.22 TYPE 2 DIABETES MELLITUS WITH STAGE 4 CHRONIC KIDNEY DISEASE, WITH LONG-TERM CURRENT USE OF INSULIN: Primary | ICD-10-CM

## 2020-08-21 DIAGNOSIS — M86.9 OSTEOMYELITIS OF LEFT FOOT, UNSPECIFIED TYPE: ICD-10-CM

## 2020-08-21 DIAGNOSIS — L97.526 DIABETIC ULCER OF LEFT FOOT ASSOCIATED WITH TYPE 2 DIABETES MELLITUS, WITH BONE INVOLVEMENT WITHOUT EVIDENCE OF NECROSIS: Primary | ICD-10-CM

## 2020-08-21 DIAGNOSIS — N18.4 TYPE 2 DIABETES MELLITUS WITH STAGE 4 CHRONIC KIDNEY DISEASE, WITH LONG-TERM CURRENT USE OF INSULIN: Primary | ICD-10-CM

## 2020-08-21 DIAGNOSIS — Z79.4 TYPE 2 DIABETES MELLITUS WITH STAGE 4 CHRONIC KIDNEY DISEASE, WITH LONG-TERM CURRENT USE OF INSULIN: Primary | ICD-10-CM

## 2020-08-21 DIAGNOSIS — E11.621 DIABETIC ULCER OF LEFT FOOT ASSOCIATED WITH TYPE 2 DIABETES MELLITUS, WITH BONE INVOLVEMENT WITHOUT EVIDENCE OF NECROSIS: Primary | ICD-10-CM

## 2020-08-21 PROCEDURE — 99214 OFFICE O/P EST MOD 30 MIN: CPT | Mod: ,,, | Performed by: PODIATRIST

## 2020-08-21 PROCEDURE — 11043 DBRDMT MUSC&/FSCA 1ST 20/<: CPT | Performed by: PODIATRIST

## 2020-08-21 PROCEDURE — 99214 PR OFFICE/OUTPT VISIT, EST, LEVL IV, 30-39 MIN: ICD-10-PCS | Mod: ,,, | Performed by: PODIATRIST

## 2020-08-21 PROCEDURE — 27201912 HC WOUND CARE DEBRIDEMENT SUPPLIES: Performed by: PODIATRIST

## 2020-08-21 NOTE — TELEPHONE ENCOUNTER
Patient was prescribed One Touch Ultra Blue test strips, ins, doesn't cover that brand please send in a new rx,

## 2020-08-21 NOTE — PROGRESS NOTES
[facility]                Progress Note    Subjective:       Patient ID: Catalino Mcfadden is a 55 y.o. male.    Chief Complaint: Diabetic Foot Ulcer    Patient ambulated to exam chair without pain or assistance. Starting NPWT to wound today with [employer] supplies. Patient instructed on wound vac alarms and how to remove/re-insert cannister if full cannister alarm sounds and how to plug in wound vac daily to keep battery optimal. Continuation of CAM boot to offload. Patient states that he always wears CAM boot when he walks to the bathroom, but other than that he is on his sofa with the leg up with assistance from wife for activities at home. Home Health 2 x week upcoming.       Review of Systems   Constitutional: Negative for appetite change, chills and fever.   Respiratory: Negative for cough, shortness of breath and wheezing.    Cardiovascular: Positive for leg swelling. Negative for chest pain.   Gastrointestinal: Negative for diarrhea, nausea and vomiting.   Musculoskeletal: Positive for arthralgias, joint swelling and myalgias.   Skin: Positive for wound.   Neurological: Positive for numbness and headaches. Negative for weakness.        + paresthesia          Objective:        Physical Exam  Vitals signs and nursing note reviewed.   Constitutional:       General: He is not in acute distress.     Appearance: He is not toxic-appearing or diaphoretic.      Comments: Pt. is well-developed, well-nourished, appears stated age, in no acute distress, alert and oriented x 3. No evidence of depression, anxiety, or agitation. Calm, cooperative, and communicative. Appropriate interactions and affect.   Cardiovascular:      Pulses:           Dorsalis pedis pulses are 2+ on the right side and 2+ on the left side.        Posterior tibial pulses are 1+ on the right side and 1+ on the left side.      Comments: There is decreased digital hair. Skin is atrophic, slightly  hyperpigmented  Pulmonary:      Effort: No respiratory distress.   Musculoskeletal:      Right ankle: He exhibits normal range of motion and no swelling. No tenderness. No lateral malleolus, no medial malleolus, no AITFL, no CF ligament and no posterior TFL tenderness found. Achilles tendon exhibits no pain, no defect and normal Hilliard's test results.      Left ankle: He exhibits normal range of motion and no swelling. No tenderness. No lateral malleolus, no medial malleolus, no AITFL, no CF ligament and no posterior TFL tenderness found. Achilles tendon exhibits no pain, no defect and normal Hilliard's test results.      Right foot: No tenderness or bony tenderness.      Left foot: No tenderness or bony tenderness.      Comments: Decreased stride, station of gait.  apropulsive toe off.  Increased angle and base of gait.    There is equinus deformity bilateral with decreased dorsiflexion at the ankle joint bilateral. No tenderness with compression of heel. Negative tinels sign. Gait analysis reveals excessive pronation through midstance and propulsion with early heel off.     Patient has hammertoes of digits 2-5 bilateral partially reducible     Decreased first MPJ range of motion both weightbearing and nonweightbearing, no crepitus observed the first MP joint, + dorsal flag sign.     Visible and palpable bunion without pain at dorsomedial 1st metatarsal head right and left.  Hallux abducted right and left partially reducible, tracks laterally without being track bound.  No ecchymosis, erythema, edema, or cardinal signs infection or signs of trauma same foot.    Fat pad atrophy to heels and met heads bilateral    Plantarflexed 1st ray RLE   Lymphadenopathy:      Comments: No lymphatic streaking    Negative lymphadenopathy bilateral popliteal fossa and tarsal tunnel.     Skin:     General: Skin is warm and dry.      Coloration: Skin is not pale.      Findings: Erythema and lesion (see wound description below)  present. No abrasion, ecchymosis, laceration or rash.      Nails: There is no clubbing.        Comments: Toenails 1-5 bilaterally discolored/yellowed, dystrophic, brittle with subungual debris.    Neurological:      Sensory: Sensory deficit present.      Comments:  [place] 5.07 monofilamant testing is diminished Kp feet. Decreased/absent vibratory sensation bilateral feet to 128Hz tuning fork.     Paresthesias, and hyperesthesia bilateral feet with no clearly identified trigger or source.           Psychiatric:         Attention and Perception: He is attentive.         Mood and Affect: Mood is not anxious. Affect is not inappropriate.         Speech: He is communicative. Speech is not slurred.         Behavior: Behavior is not combative.                Vitals:    08/21/20 0935   BP: (!) 142/78   Pulse: 81   Resp: 20   Temp: 96.7 °F (35.9 °C)       Assessment:           ICD-10-CM ICD-9-CM   1. 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  E11.621 250.80    L97.526 707.15            Negative Pressure Wound Therapy  08/21/20 1007 Left lateral (Active)   08/21/20 1007   Side: Left   Orientation: lateral   Location: Foot   Additional Comments:    Location:    SDO Location:    NPWT Type Vacuum Therapy 08/21/20 1000   Therapy Setting NPWT Continuous therapy 08/21/20 1000   Pressure Setting NPWT 125 mmHg 08/21/20 1000   Sponges Inserted NPWT Black;2 08/21/20 1000            Incision/Site 07/17/20 1251 Left Foot (Active)   07/17/20 1251   Present Prior to Hospital Arrival?:    Side: Left   Location: Foot   Orientation:    Incision Type:    Closure Method: Other (see comments)   Additional Comments:  open wound .betadine soak in 4x4's, abd, ace, castpadding, surgical boot   Removal Indication and Assessment:    Wound Outcome:    Removal Indications:    Wound Image   08/21/20 0939   Incision WDL ex 08/21/20 0939   Dressing Appearance  Saturated 08/21/20 0939   Drainage Amount Copious 08/21/20 0939   Drainage Characteristics/Odor Green 08/21/20 0939   Appearance Red;Muscle 08/21/20 0939   Black (%), Wound Tissue Color 0 % 08/21/20 0939   Red (%), Wound Tissue Color 100 % 08/21/20 0939   Yellow (%), Wound Tissue Color 0 % 08/21/20 0939   Periwound Area Macerated 08/21/20 0939   Wound Edges Open 08/21/20 0939   Wound Length (cm) 4 cm 08/21/20 0939   Wound Width (cm) 5 cm 08/21/20 0939   Wound Depth (cm) 1 cm 08/21/20 0939   Wound Volume (cm^3) 20 cm^3 08/21/20 0939   Wound Surface Area (cm^2) 20 cm^2 08/21/20 0939   Undermining (depth (cm)/location) 12-7 oclock 1.5 at deepest at 4 oclock 08/21/20 0939   Care Cleansed with:;Other (see comments);Sterile normal saline;Soap and water 08/21/20 0939   Dressing Changed;Foam;Transparent film 08/21/20 0939   Periwound Care Skin barrier film applied 08/21/20 0939   Off Loading Football dressing 08/21/20 0939           Plan:            Greater than 50% of this visit spent on counseling and coordination of care.    Education about the prevention of limb loss.    Discussed wound healing cycle, skin integrity, ways to care for skin.Counseled patient on the effects of blood glucose on healing. He verbalizes understanding that it can increase the chances of delayed healing and this prolonged exposure leads to infection or progression of infection which subsequently can result in loss of limb.    The wound is cleansed of foreign material as much as possible and the base inspected for bone or abscess.  Base is fibrogranular and hypergranular with bone no longer palpable to wound bed.  Patient already evaluated and treated by Infectious Disease with plans for Zosyn until September.  PICC line in place.      VAC orders for HH placed    VAC applied in clinic today    Orders Placed This Encounter   Procedures    SUBSEQUENT HOME HEALTH ORDERS     Subsequent Home Health Orders    Current Medications:  Current Outpatient  "Medications:  amLODIPine (NORVASC) 10 MG tablet, Take 1 tablet (10 mg total) by mouth once daily., Disp: 90 tablet, Rfl: 3  atorvastatin (LIPITOR) 40 MG tablet, Take 1 tablet (40 mg total) by mouth once daily., Disp: 90 tablet, Rfl: 3  BASAGLAR KWIKPEN U-100 INSULIN glargine 100 units/mL (3mL) SubQ pen, ADM 20 UNI SC BID, Disp: , Rfl:   blood sugar diagnostic Strp, 1 strip by Misc.(Non-Drug; Combo Route) route 3 (three) times daily. Patient needs One Touch Test Strips (Berio)., Disp: 100 strip, Rfl: 11  blood sugar diagnostic Strp, 1 strip by Misc.(Non-Drug; Combo Route) route 3 (three) times daily. Patient needs One Touch Test Strips (Berio)., Disp: 100 strip, Rfl: 2  carvediloL (COREG) 25 MG tablet, Take 1 tablet (25 mg total) by mouth 2 (two) times daily., Disp: 60 tablet, Rfl: 11  dulaglutide (TRULICITY) 0.75 mg/0.5 mL pen injector, INJECT 0.5 ML UNDER THE SKIN EVERY 7 DAYS, Disp: 2 mL, Rfl: 5  furosemide (LASIX) 40 MG tablet, Take 1 tablet (40 mg total) by mouth once daily., Disp: 30 tablet, Rfl: 0  gabapentin (NEURONTIN) 300 MG capsule, Take 1 capsule (300 mg total) by mouth 2 (two) times daily., Disp: 60 capsule, Rfl: 11  hydrALAZINE (APRESOLINE) 100 MG tablet, Take 1 tablet (100 mg total) by mouth every 8 (eight) hours., Disp: 90 tablet, Rfl: 11  insulin aspart U-100 (NOVOLOG) 100 unit/mL (3 mL) InPn pen, Inject 5 Units into the skin 3 (three) times daily with meals., Disp: 13.5 mL, Rfl: 3  insulin detemir U-100 (LEVEMIR FLEXTOUCH) 100 unit/mL (3 mL) SubQ InPn pen, Inject 10 Units into the skin every evening., Disp: 9 mL, Rfl: 3  pen needle, diabetic 32 gauge x 3/16" Ndle, 1 each by Misc.(Non-Drug; Combo Route) route 5 (five) times daily., Disp: 100 each, Rfl: 10  piperacillin sodium/tazobactam (PIPERACILLIN-TAZOBACTAM 4.5G/100ML SODIUM CHLORIDE 0.9%-READY TO MIX), Inject 100 mLs (4.5 g total) into the vein every 12 (twelve) hours., Disp:  , Rfl:   pulse oximeter (PULSE OXIMETER) device, by Apply Externally " route 2 (two) times a day. Use twice daily at 8 AM and 3 PM and record the value in MyChart as directed., Disp: 1 each, Rfl: 0    No current facility-administered medications for this encounter.       Nursing:     Home Health to change wound vac dressing to Left Foot on Monday 8/24/20 and Wedesday 8/26/20.   Irrigate wound with saline. Prep periwound with cavilon, benzoin, and eakins ring (usually for ostomies but works well as seal to prevent leaking). Window dress around wound, side of foot and dorsal foot with transparent drape to protect periwound skin prior to applying black sponge to wound bed which then leads away to dorsal foot where suction pad will be placed. Cut dime sized hole prior to placing suction pad to dorsal foot. Apply eran foam long x 2 to plantar foot and wrap with cast padding x 3. Secure all with Tubigrip medium (note - hole cut at ankle to threat suction tubing through). Suction set to 125 mmHg low continuous.     Order Specific Question:   What Home Health Agency is the patient currently using?     Answer:   [facility]    Change dressing     Soak wound with vinegar for 10 minutes. Cavilon, benzoin, and eakins ring periwound with transparent drape window to protect. Apply black sponge with island dressing to dorsal foot for suction head. Eran foam long x 2 to plantar foot with football. Tubigrip medium. NPWT 125 mmHg low continuous suction.        Follow up in about 1 week (around 8/28/2020) for wound care.

## 2020-08-22 NOTE — PATIENT INSTRUCTIONS
1.  Continue the clindamycin for now.   2.  Will send in a prescription for ciprofloxacin this evening once I have your blood results and can check your kidney function for dosing of the antibiotic.    3.  I will arrange for PICC (if kidney function ok) and IV antibiotics once I have culture results back.   4.  Blood work today cbc, cmp   5.  Call or seek immediate medical attention for any fevers, chills, sweats, diarrhea, n/v or increase in pain, swelling, drainage from wound.   6.  Would like you to take a probiotic daily - this is over the counter.  Ask for Florastor     
pt ind in ambulation - steady gait/independent

## 2020-08-24 ENCOUNTER — DOCUMENT SCAN (OUTPATIENT)
Dept: HOME HEALTH SERVICES | Facility: HOSPITAL | Age: 55
End: 2020-08-24
Payer: COMMERCIAL

## 2020-08-24 ENCOUNTER — LAB VISIT (OUTPATIENT)
Dept: LAB | Facility: HOSPITAL | Age: 55
End: 2020-08-24
Attending: INTERNAL MEDICINE
Payer: COMMERCIAL

## 2020-08-24 DIAGNOSIS — N18.30 CHRONIC KIDNEY DISEASE, STAGE III (MODERATE): ICD-10-CM

## 2020-08-24 DIAGNOSIS — I12.9 PARENCHYMAL RENAL HYPERTENSION: ICD-10-CM

## 2020-08-24 DIAGNOSIS — L97.529 ULCER OF LEFT FOOT: ICD-10-CM

## 2020-08-24 DIAGNOSIS — L97.509 DIABETIC FOOT ULCER WITH OSTEOMYELITIS: Primary | ICD-10-CM

## 2020-08-24 DIAGNOSIS — E11.621 DIABETIC FOOT ULCER WITH OSTEOMYELITIS: Primary | ICD-10-CM

## 2020-08-24 DIAGNOSIS — E11.69 DIABETIC FOOT ULCER WITH OSTEOMYELITIS: Primary | ICD-10-CM

## 2020-08-24 DIAGNOSIS — M86.9 DIABETIC FOOT ULCER WITH OSTEOMYELITIS: Primary | ICD-10-CM

## 2020-08-24 DIAGNOSIS — M86.111: ICD-10-CM

## 2020-08-24 LAB
ALBUMIN SERPL BCP-MCNC: 2.4 G/DL (ref 3.5–5.2)
ALP SERPL-CCNC: 396 U/L (ref 55–135)
ALT SERPL W/O P-5'-P-CCNC: 46 U/L (ref 10–44)
ANION GAP SERPL CALC-SCNC: 12 MMOL/L (ref 8–16)
AST SERPL-CCNC: 43 U/L (ref 10–40)
BASOPHILS # BLD AUTO: 0.03 K/UL (ref 0–0.2)
BASOPHILS # BLD AUTO: 0.03 K/UL (ref 0–0.2)
BASOPHILS NFR BLD: 0.6 % (ref 0–1.9)
BASOPHILS NFR BLD: 0.6 % (ref 0–1.9)
BILIRUB SERPL-MCNC: 0.4 MG/DL (ref 0.1–1)
BUN SERPL-MCNC: 49 MG/DL (ref 6–20)
CALCIUM SERPL-MCNC: 7.8 MG/DL (ref 8.7–10.5)
CHLORIDE SERPL-SCNC: 102 MMOL/L (ref 95–110)
CO2 SERPL-SCNC: 22 MMOL/L (ref 23–29)
CREAT SERPL-MCNC: 3.4 MG/DL (ref 0.5–1.4)
CRP SERPL-MCNC: 12 MG/L (ref 0–8.2)
DIFFERENTIAL METHOD: ABNORMAL
DIFFERENTIAL METHOD: ABNORMAL
EOSINOPHIL # BLD AUTO: 0.1 K/UL (ref 0–0.5)
EOSINOPHIL # BLD AUTO: 0.1 K/UL (ref 0–0.5)
EOSINOPHIL NFR BLD: 2.2 % (ref 0–8)
EOSINOPHIL NFR BLD: 2.2 % (ref 0–8)
ERYTHROCYTE [DISTWIDTH] IN BLOOD BY AUTOMATED COUNT: 14.3 % (ref 11.5–14.5)
ERYTHROCYTE [DISTWIDTH] IN BLOOD BY AUTOMATED COUNT: 14.3 % (ref 11.5–14.5)
EST. GFR  (AFRICAN AMERICAN): 22 ML/MIN/1.73 M^2
EST. GFR  (NON AFRICAN AMERICAN): 19 ML/MIN/1.73 M^2
GLUCOSE SERPL-MCNC: 209 MG/DL (ref 70–110)
HCT VFR BLD AUTO: 28.2 % (ref 40–54)
HCT VFR BLD AUTO: 28.2 % (ref 40–54)
HGB BLD-MCNC: 8.6 G/DL (ref 14–18)
HGB BLD-MCNC: 8.6 G/DL (ref 14–18)
IMM GRANULOCYTES # BLD AUTO: 0.02 K/UL (ref 0–0.04)
IMM GRANULOCYTES # BLD AUTO: 0.02 K/UL (ref 0–0.04)
IMM GRANULOCYTES NFR BLD AUTO: 0.4 % (ref 0–0.5)
IMM GRANULOCYTES NFR BLD AUTO: 0.4 % (ref 0–0.5)
LYMPHOCYTES # BLD AUTO: 0.8 K/UL (ref 1–4.8)
LYMPHOCYTES # BLD AUTO: 0.8 K/UL (ref 1–4.8)
LYMPHOCYTES NFR BLD: 15.7 % (ref 18–48)
LYMPHOCYTES NFR BLD: 15.7 % (ref 18–48)
MCH RBC QN AUTO: 26.4 PG (ref 27–31)
MCH RBC QN AUTO: 26.4 PG (ref 27–31)
MCHC RBC AUTO-ENTMCNC: 30.5 G/DL (ref 32–36)
MCHC RBC AUTO-ENTMCNC: 30.5 G/DL (ref 32–36)
MCV RBC AUTO: 87 FL (ref 82–98)
MCV RBC AUTO: 87 FL (ref 82–98)
MONOCYTES # BLD AUTO: 0.6 K/UL (ref 0.3–1)
MONOCYTES # BLD AUTO: 0.6 K/UL (ref 0.3–1)
MONOCYTES NFR BLD: 12.4 % (ref 4–15)
MONOCYTES NFR BLD: 12.4 % (ref 4–15)
NEUTROPHILS # BLD AUTO: 3.5 K/UL (ref 1.8–7.7)
NEUTROPHILS # BLD AUTO: 3.5 K/UL (ref 1.8–7.7)
NEUTROPHILS NFR BLD: 68.7 % (ref 38–73)
NEUTROPHILS NFR BLD: 68.7 % (ref 38–73)
NRBC BLD-RTO: 0 /100 WBC
NRBC BLD-RTO: 0 /100 WBC
PLATELET # BLD AUTO: 251 K/UL (ref 150–350)
PLATELET # BLD AUTO: 251 K/UL (ref 150–350)
PMV BLD AUTO: 9.6 FL (ref 9.2–12.9)
PMV BLD AUTO: 9.6 FL (ref 9.2–12.9)
POTASSIUM SERPL-SCNC: 4.1 MMOL/L (ref 3.5–5.1)
PROT SERPL-MCNC: 6 G/DL (ref 6–8.4)
RBC # BLD AUTO: 3.26 M/UL (ref 4.6–6.2)
RBC # BLD AUTO: 3.26 M/UL (ref 4.6–6.2)
SODIUM SERPL-SCNC: 136 MMOL/L (ref 136–145)
WBC # BLD AUTO: 5.09 K/UL (ref 3.9–12.7)
WBC # BLD AUTO: 5.09 K/UL (ref 3.9–12.7)

## 2020-08-24 PROCEDURE — 85025 COMPLETE CBC W/AUTO DIFF WBC: CPT

## 2020-08-24 PROCEDURE — 86140 C-REACTIVE PROTEIN: CPT

## 2020-08-24 PROCEDURE — 80053 COMPREHEN METABOLIC PANEL: CPT

## 2020-08-25 ENCOUNTER — TELEPHONE (OUTPATIENT)
Dept: INFECTIOUS DISEASES | Facility: CLINIC | Age: 55
End: 2020-08-25

## 2020-08-25 DIAGNOSIS — M86.672 CHRONIC OSTEOMYELITIS OF LEFT FOOT: Primary | ICD-10-CM

## 2020-08-25 NOTE — TELEPHONE ENCOUNTER
----- Message from Marzena Bennett LPN sent at 8/24/2020  2:44 PM CDT -----  Marina Stewart Staff  Caller: Unspecified (Today,  2:22 PM)         [facility] stated she need a new specimen from the pt due to a time limit of 4 hr. Call back [phone]

## 2020-08-25 NOTE — TELEPHONE ENCOUNTER
Pat Resendiz MD  You 16 minutes ago (8:46 AM)     It's OK. They can skip it this week.     Message text

## 2020-08-25 NOTE — TELEPHONE ENCOUNTER
Please sign and add order for sed rate. The lab could not use the same blood, due to lab draw reach 4 hour limit before they were told to add this test.  Once orders are add, patient will be scheduled.    Thanks

## 2020-08-26 ENCOUNTER — TELEPHONE (OUTPATIENT)
Dept: PODIATRY | Facility: CLINIC | Age: 55
End: 2020-08-26

## 2020-08-28 ENCOUNTER — TELEPHONE (OUTPATIENT)
Dept: WOUND CARE | Facility: HOSPITAL | Age: 55
End: 2020-08-28

## 2020-08-28 ENCOUNTER — HOSPITAL ENCOUNTER (OUTPATIENT)
Dept: WOUND CARE | Facility: HOSPITAL | Age: 55
Discharge: HOME | End: 2020-08-28
Attending: PODIATRIST
Payer: COMMERCIAL

## 2020-08-28 VITALS
RESPIRATION RATE: 17 BRPM | HEART RATE: 85 BPM | SYSTOLIC BLOOD PRESSURE: 134 MMHG | DIASTOLIC BLOOD PRESSURE: 95 MMHG | TEMPERATURE: 97 F

## 2020-08-28 DIAGNOSIS — L97.526 DIABETIC ULCER OF LEFT FOOT ASSOCIATED WITH TYPE 2 DIABETES MELLITUS, WITH BONE INVOLVEMENT WITHOUT EVIDENCE OF NECROSIS: Primary | ICD-10-CM

## 2020-08-28 DIAGNOSIS — E11.49 TYPE II DIABETES MELLITUS WITH NEUROLOGICAL MANIFESTATIONS: ICD-10-CM

## 2020-08-28 DIAGNOSIS — E11.621 DIABETIC ULCER OF LEFT FOOT ASSOCIATED WITH TYPE 2 DIABETES MELLITUS, WITH BONE INVOLVEMENT WITHOUT EVIDENCE OF NECROSIS: Primary | ICD-10-CM

## 2020-08-28 PROCEDURE — 97605 NEG PRS WND THER DME<=50SQCM: CPT | Performed by: PODIATRIST

## 2020-08-28 PROCEDURE — 99499 NO LOS: ICD-10-PCS | Mod: ,,, | Performed by: PODIATRIST

## 2020-08-28 PROCEDURE — 11042 DBRDMT SUBQ TIS 1ST 20SQCM/<: CPT | Mod: ,,, | Performed by: PODIATRIST

## 2020-08-28 PROCEDURE — 11042 DBRDMT SUBQ TIS 1ST 20SQCM/<: CPT | Performed by: PODIATRIST

## 2020-08-28 PROCEDURE — 11042 WOUND DEBRIDEMENT: ICD-10-PCS | Mod: ,,, | Performed by: PODIATRIST

## 2020-08-28 PROCEDURE — 27201912 HC WOUND CARE DEBRIDEMENT SUPPLIES: Performed by: PODIATRIST

## 2020-08-28 PROCEDURE — 99499 UNLISTED E&M SERVICE: CPT | Mod: ,,, | Performed by: PODIATRIST

## 2020-08-28 NOTE — PROGRESS NOTES
[facility]                Progress Note    Subjective:       Patient ID: Catalino Mcfadden is a 55 y.o. male.    Chief Complaint: Non-healing Wound Follow Up and Diabetic Foot Ulcer    08/28/2020: F/U visit. Wound is stable. He has had a VAC change by HH since last encounter without incident.  No new pedal complauints.        Review of Systems   Constitutional: Negative for appetite change, chills and fever.   Respiratory: Negative for cough, shortness of breath and wheezing.    Cardiovascular: Positive for leg swelling. Negative for chest pain.   Gastrointestinal: Negative for diarrhea, nausea and vomiting.   Musculoskeletal: Positive for arthralgias, joint swelling and myalgias.   Skin: Positive for wound.   Neurological: Positive for numbness and headaches. Negative for weakness.        + paresthesia          Objective:        Physical Exam  Vitals signs and nursing note reviewed.   Constitutional:       General: He is not in acute distress.     Appearance: He is not toxic-appearing or diaphoretic.      Comments: Pt. is well-developed, well-nourished, appears stated age, in no acute distress, alert and oriented x 3. No evidence of depression, anxiety, or agitation. Calm, cooperative, and communicative. Appropriate interactions and affect.   Cardiovascular:      Pulses:           Dorsalis pedis pulses are 2+ on the right side and 2+ on the left side.        Posterior tibial pulses are 1+ on the right side and 1+ on the left side.      Comments: There is decreased digital hair. Skin is atrophic, slightly hyperpigmented  Pulmonary:      Effort: No respiratory distress.   Musculoskeletal:      Right ankle: He exhibits normal range of motion and no swelling. No tenderness. No lateral malleolus, no medial malleolus, no AITFL, no CF ligament and no posterior TFL tenderness found. Achilles tendon exhibits no pain, no defect and normal Hilliard's test results.      Left ankle: He  exhibits normal range of motion and no swelling. No tenderness. No lateral malleolus, no medial malleolus, no AITFL, no CF ligament and no posterior TFL tenderness found. Achilles tendon exhibits no pain, no defect and normal Hilliard's test results.      Right foot: No tenderness or bony tenderness.      Left foot: No tenderness or bony tenderness.      Comments: Decreased stride, station of gait.  apropulsive toe off.  Increased angle and base of gait.    There is equinus deformity bilateral with decreased dorsiflexion at the ankle joint bilateral. No tenderness with compression of heel. Negative tinels sign. Gait analysis reveals excessive pronation through midstance and propulsion with early heel off.     Patient has hammertoes of digits 2-5 bilateral partially reducible     Decreased first MPJ range of motion both weightbearing and nonweightbearing, no crepitus observed the first MP joint, + dorsal flag sign.     Visible and palpable bunion without pain at dorsomedial 1st metatarsal head right and left.  Hallux abducted right and left partially reducible, tracks laterally without being track bound.  No ecchymosis, erythema, edema, or cardinal signs infection or signs of trauma same foot.    Fat pad atrophy to heels and met heads bilateral    Plantarflexed 1st ray RLE   Lymphadenopathy:      Comments: No lymphatic streaking    Negative lymphadenopathy bilateral popliteal fossa and tarsal tunnel.     Skin:     General: Skin is warm and dry.      Coloration: Skin is not pale.      Findings: Erythema and lesion (see wound description below) present. No abrasion, ecchymosis, laceration or rash.      Nails: There is no clubbing.        Comments: Toenails 1-5 bilaterally discolored/yellowed, dystrophic, brittle with subungual debris.    Neurological:      Sensory: Sensory deficit present.      Comments:  [place] 5.07 monofilamant testing is diminished Kp feet. Decreased/absent vibratory sensation bilateral  feet to 128Hz tuning fork.     Paresthesias, and hyperesthesia bilateral feet with no clearly identified trigger or source.           Psychiatric:         Attention and Perception: He is attentive.         Mood and Affect: Mood is not anxious. Affect is not inappropriate.         Speech: He is communicative. Speech is not slurred.         Behavior: Behavior is not combative.           Vitals:    08/28/20 1035   BP: (!) 134/95   Pulse: 85   Resp: 17   Temp: 96.9 °F (36.1 °C)       Assessment:           ICD-10-CM ICD-9-CM   1. 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  E11.621 250.80    L97.526 707.15            Negative Pressure Wound Therapy  08/21/20 1007 Left lateral (Active)   08/21/20 1007   Side: Left   Orientation: lateral   Location: Foot   Additional Comments:    Location:    SDO Location:    NPWT Type Vacuum Therapy 08/28/20 1000   Therapy Setting NPWT Continuous therapy 08/28/20 1000   Pressure Setting NPWT 125 mmHg 08/28/20 1000   Therapy Interventions NPWT Dressing changed;Seal intact;Canister changed 08/28/20 1000   Sponges Inserted NPWT Black;1 08/28/20 1000   Sponges Removed NPWT Black;1 08/28/20 1000   General Output (mL) 25 08/28/20 1000            Incision/Site 07/17/20 1251 Left Foot (Active)   07/17/20 1251   Present Prior to Hospital Arrival?:    Side: Left   Location: Foot   Orientation:    Incision Type:    Closure Method: Other (see comments)   Additional Comments:  open wound .betadine soak in 4x4's, abd, ace, castpadding, surgical boot   Removal Indication and Assessment:    Wound Outcome:    Removal Indications:    Wound Image   08/28/20 1000   Incision WDL ex 08/28/20 1000   Dressing Appearance Intact;Moist drainage 08/28/20 1000   Drainage Amount Moderate 08/28/20 1000   Drainage Characteristics/Odor Serosanguineous 08/28/20 1000   Appearance Pink;Red;White;Slough 08/28/20 1000   Black (%), Wound Tissue Color  "0 % 08/28/20 1000   Red (%), Wound Tissue Color 95 % 08/28/20 1000   Yellow (%), Wound Tissue Color 5 % 08/28/20 1000   Periwound Area Macerated 08/28/20 1000   Wound Edges Defined 08/28/20 1000   Wound Length (cm) 3.3 cm 08/28/20 1000   Wound Width (cm) 3.8 cm 08/28/20 1000   Wound Depth (cm) 0.1 cm 08/28/20 1000   Wound Volume (cm^3) 1.25 cm^3 08/28/20 1000   Wound Surface Area (cm^2) 12.54 cm^2 08/28/20 1000   Tunneling (depth (cm)/location) 0 08/28/20 1000   Undermining (depth (cm)/location) 0.7cm in depth extending from 7 to 9 08/28/20 1000   Care Soap and water;Sterile normal saline;Wound cleanser 08/28/20 1000   Dressing Applied;Collagen;Other (see comments);Foam 08/28/20 1000   Periwound Care Other (see comments) 08/28/20 1000   Compression Tubular elasticized bandage 08/28/20 1000   Off Loading Football dressing 08/28/20 1000   Dressing Change Due 09/04/20 08/28/20 1000           Plan:              Greater than 50% of this visit spent on counseling and coordination of care.    Education about the prevention of limb loss.    Discussed wound healing cycle, skin integrity, ways to care for skin.Counseled patient on the effects of blood glucose on healing. He verbalizes understanding that it can increase the chances of delayed healing and this prolonged exposure leads to infection or progression of infection which subsequently can result in loss of limb.    The wound is cleansed of foreign material as much as possible and the base inspected for bone or abscess.  Base is fibrogranular,  bone no longer palpable to wound bed.  Patient already evaluated and treated by Infectious Disease with plans for Zosyn until September.  PICC line in place.      VAC orders for HH placed    VAC applied in clinic today      Wound Debridement    Date/Time: 8/28/2020 12:27 PM  Performed by: Aviva Martinez DPM  Authorized by: Aviva Martinez DPM     Time out: Immediately prior to procedure a "time out" was called to verify the " correct patient, procedure, equipment, support staff and site/side marked as required.    Consent Done?:  Yes (Written)    Preparation: Patient was prepped and draped in usual sterile fashion    Local anesthesia used?: No      Wound Details:    Location:  Left foot    Location:  Left Midfoot    Type of Debridement:  Excisional       Length (cm):  3.3       Area (sq cm):  12.54       Width (cm):  3.8       Percent Debrided (%):  100       Depth (cm):  0.1       Total Area Debrided (sq cm):  12.54    Depth of debridement:  Subcutaneous tissue    Tissue debrided:  Dermis, Epidermis and Subcutaneous    Devitalized tissue debrided:  Fibrin and Callus    Instruments:  Curette    Bleeding:  Minimal  Hemostasis Achieved: Yes    Method Used:  Pressure  Patient tolerance:  Patient tolerated the procedure well with no immediate complications        Orders Placed This Encounter   Procedures    Change dressing     Gentian violet periwound/cavilon, endoform to wound bed and packed to the undermining, eakins ring, wound vac (black:1), jaleel foam long X3 perpendicular, football dressing (cast padding X3), single layer medium gradient tubigrip size E.    HOME HEALTH ORDERS     Subsequent Home Health Orders    Current Medications:  Current Outpatient Medications:  amLODIPine (NORVASC) 10 MG tablet, Take 1 tablet (10 mg total) by mouth once daily., Disp: 90 tablet, Rfl: 3  atorvastatin (LIPITOR) 40 MG tablet, Take 1 tablet (40 mg total) by mouth once daily., Disp: 90 tablet, Rfl: 3  BASAGLAR KWIKPEN U-100 INSULIN glargine 100 units/mL (3mL) SubQ pen, ADM 20 UNI SC BID, Disp: , Rfl:   blood sugar diagnostic Strp, 1 strip by Misc.(Non-Drug; Combo Route) route 3 (three) times daily. Patient needs One Touch Test Strips (Berio)., Disp: 100 strip, Rfl: 11  blood sugar diagnostic Strp, 1 strip by Misc.(Non-Drug; Combo Route) route 3 (three) times daily. Patient needs One Touch Test Strips (Berio)., Disp: 100 strip, Rfl: 2  blood sugar  "diagnostic Strp, To check BG 2 times daily, to use with insurance preferred meter, Disp: 100 each, Rfl: 3  carvediloL (COREG) 25 MG tablet, Take 1 tablet (25 mg total) by mouth 2 (two) times daily., Disp: 60 tablet, Rfl: 11  dulaglutide (TRULICITY) 0.75 mg/0.5 mL pen injector, INJECT 0.5 ML UNDER THE SKIN EVERY 7 DAYS, Disp: 2 mL, Rfl: 5  furosemide (LASIX) 40 MG tablet, Take 1 tablet (40 mg total) by mouth once daily., Disp: 30 tablet, Rfl: 0  gabapentin (NEURONTIN) 300 MG capsule, Take 1 capsule (300 mg total) by mouth 2 (two) times daily., Disp: 60 capsule, Rfl: 11  hydrALAZINE (APRESOLINE) 100 MG tablet, Take 1 tablet (100 mg total) by mouth every 8 (eight) hours., Disp: 90 tablet, Rfl: 11  insulin aspart U-100 (NOVOLOG) 100 unit/mL (3 mL) InPn pen, Inject 5 Units into the skin 3 (three) times daily with meals., Disp: 13.5 mL, Rfl: 3  insulin detemir U-100 (LEVEMIR FLEXTOUCH) 100 unit/mL (3 mL) SubQ InPn pen, Inject 10 Units into the skin every evening., Disp: 9 mL, Rfl: 3  pen needle, diabetic 32 gauge x 3/16" Ndle, 1 each by Misc.(Non-Drug; Combo Route) route 5 (five) times daily., Disp: 100 each, Rfl: 10  piperacillin sodium/tazobactam (PIPERACILLIN-TAZOBACTAM 4.5G/100ML SODIUM CHLORIDE 0.9%-READY TO MIX), Inject 100 mLs (4.5 g total) into the vein every 12 (twelve) hours., Disp:  , Rfl:   pulse oximeter (PULSE OXIMETER) device, by Apply Externally route 2 (two) times a day. Use twice daily at 8 AM and 3 PM and record the value in MyChart as directed., Disp: 1 each, Rfl: 0    No current facility-administered medications for this encounter.       Nursing:   Home Health to change wound vac dressing to Left Foot on Tuesday, September 2nd, 2020.   Irrigate wound with saline. Prep periwound with cavilon, benzoin. Apply endoform to wound bed and pack to the undermining area, and eakins ring (usually for ostomies but works well as seal to prevent leaking). Window dressing around wound, side of foot and dorsal foot " with transparent drape to protect periwound skin prior to applying black sponge to wound bed which then leads away to dorsal foot where suction pad will be placed. Cut dime sized hole prior to placing suction pad to dorsal foot. Apply marycarmen foam long x 3 perpendicular to plantar foot and football dressing with cast padding x 3. Secure all with single layer tubigrip medium size E (note - hole cut at ankle to threat suction tubing through). Suction set to 125 mmHg low continuous.     Order Specific Question:   What Home Health Agency is the patient currently using?     Answer:   [facility]        No follow-ups on file.

## 2020-08-31 ENCOUNTER — LAB VISIT (OUTPATIENT)
Dept: LAB | Facility: HOSPITAL | Age: 55
End: 2020-08-31
Attending: INTERNAL MEDICINE
Payer: COMMERCIAL

## 2020-08-31 ENCOUNTER — TELEPHONE (OUTPATIENT)
Dept: INFECTIOUS DISEASES | Facility: CLINIC | Age: 55
End: 2020-08-31

## 2020-08-31 ENCOUNTER — DOCUMENT SCAN (OUTPATIENT)
Dept: HOME HEALTH SERVICES | Facility: HOSPITAL | Age: 55
End: 2020-08-31
Payer: COMMERCIAL

## 2020-08-31 DIAGNOSIS — E78.00 PURE HYPERCHOLESTEROLEMIA: ICD-10-CM

## 2020-08-31 DIAGNOSIS — L97.529 ULCER OF LEFT FOOT: ICD-10-CM

## 2020-08-31 DIAGNOSIS — E11.620 DIABETIC NECROBIOSIS LIPOIDICA: Primary | ICD-10-CM

## 2020-08-31 DIAGNOSIS — I12.9 PARENCHYMAL RENAL HYPERTENSION: ICD-10-CM

## 2020-08-31 LAB
ALBUMIN SERPL BCP-MCNC: 2.8 G/DL (ref 3.5–5.2)
ALP SERPL-CCNC: 443 U/L (ref 55–135)
ALT SERPL W/O P-5'-P-CCNC: 36 U/L (ref 10–44)
ANION GAP SERPL CALC-SCNC: 12 MMOL/L (ref 8–16)
AST SERPL-CCNC: 36 U/L (ref 10–40)
BASOPHILS # BLD AUTO: 0.02 K/UL (ref 0–0.2)
BASOPHILS NFR BLD: 0.3 % (ref 0–1.9)
BILIRUB SERPL-MCNC: 0.6 MG/DL (ref 0.1–1)
BUN SERPL-MCNC: 40 MG/DL (ref 6–20)
CALCIUM SERPL-MCNC: 8.4 MG/DL (ref 8.7–10.5)
CHLORIDE SERPL-SCNC: 102 MMOL/L (ref 95–110)
CO2 SERPL-SCNC: 22 MMOL/L (ref 23–29)
CREAT SERPL-MCNC: 2.8 MG/DL (ref 0.5–1.4)
CRP SERPL-MCNC: 15.2 MG/L (ref 0–8.2)
DIFFERENTIAL METHOD: ABNORMAL
EOSINOPHIL # BLD AUTO: 0.1 K/UL (ref 0–0.5)
EOSINOPHIL NFR BLD: 2.2 % (ref 0–8)
ERYTHROCYTE [DISTWIDTH] IN BLOOD BY AUTOMATED COUNT: 14.3 % (ref 11.5–14.5)
ERYTHROCYTE [SEDIMENTATION RATE] IN BLOOD BY WESTERGREN METHOD: 106 MM/HR (ref 0–10)
EST. GFR  (AFRICAN AMERICAN): 28 ML/MIN/1.73 M^2
EST. GFR  (NON AFRICAN AMERICAN): 24 ML/MIN/1.73 M^2
GLUCOSE SERPL-MCNC: 88 MG/DL (ref 70–110)
HCT VFR BLD AUTO: 28.6 % (ref 40–54)
HGB BLD-MCNC: 9.1 G/DL (ref 14–18)
IMM GRANULOCYTES # BLD AUTO: 0.03 K/UL (ref 0–0.04)
IMM GRANULOCYTES NFR BLD AUTO: 0.5 % (ref 0–0.5)
LYMPHOCYTES # BLD AUTO: 0.9 K/UL (ref 1–4.8)
LYMPHOCYTES NFR BLD: 14.9 % (ref 18–48)
MCH RBC QN AUTO: 26.8 PG (ref 27–31)
MCHC RBC AUTO-ENTMCNC: 31.8 G/DL (ref 32–36)
MCV RBC AUTO: 84 FL (ref 82–98)
MONOCYTES # BLD AUTO: 0.7 K/UL (ref 0.3–1)
MONOCYTES NFR BLD: 11.1 % (ref 4–15)
NEUTROPHILS # BLD AUTO: 4.4 K/UL (ref 1.8–7.7)
NEUTROPHILS NFR BLD: 71 % (ref 38–73)
NRBC BLD-RTO: 0 /100 WBC
PLATELET # BLD AUTO: 240 K/UL (ref 150–350)
PMV BLD AUTO: 9.5 FL (ref 9.2–12.9)
POTASSIUM SERPL-SCNC: 4 MMOL/L (ref 3.5–5.1)
PROT SERPL-MCNC: 6.3 G/DL (ref 6–8.4)
RBC # BLD AUTO: 3.39 M/UL (ref 4.6–6.2)
SODIUM SERPL-SCNC: 136 MMOL/L (ref 136–145)
WBC # BLD AUTO: 6.23 K/UL (ref 3.9–12.7)

## 2020-08-31 PROCEDURE — 86140 C-REACTIVE PROTEIN: CPT

## 2020-08-31 PROCEDURE — 80053 COMPREHEN METABOLIC PANEL: CPT

## 2020-08-31 PROCEDURE — 85652 RBC SED RATE AUTOMATED: CPT

## 2020-08-31 PROCEDURE — 85025 COMPLETE CBC W/AUTO DIFF WBC: CPT

## 2020-08-31 NOTE — TELEPHONE ENCOUNTER
----- Message from Pat Resendiz MD sent at 8/31/2020 12:51 PM CDT -----  Sorry...attached  ----- Message -----  From: Myranda Kohli MA  Sent: 8/31/2020  12:44 PM CDT  To: Pat Resendiz MD    Can you send me the patient's info, please?  ----- Message -----  From: Pat Resendiz MD  Sent: 8/31/2020  12:41 PM CDT  To: RIGOBERTO Montes  His End date is tomorrow. Can we arrange PICC removal? He is on the [facility]

## 2020-08-31 NOTE — TELEPHONE ENCOUNTER
Spoke to Arlet with [facility]. Patient will be discharged off of IV abx on tomorrow, per provider. Asael is the only nurse who can pull piccs with [facility]. If Asael can't pull line, then patient will come to our department to have this done.

## 2020-09-01 RX ORDER — ATORVASTATIN CALCIUM 40 MG/1
40 TABLET, FILM COATED ORAL DAILY
Qty: 90 TABLET | Refills: 0 | Status: SHIPPED | OUTPATIENT
Start: 2020-09-01 | End: 2020-10-26 | Stop reason: SDUPTHER

## 2020-09-01 NOTE — TELEPHONE ENCOUNTER
Lab Results   Component Value Date    CHOL 173 08/28/2019    CHOL 230 (H) 05/27/2019    CHOL 251 (H) 02/12/2018     Lab Results   Component Value Date    HDL 45 08/28/2019    HDL 49 05/27/2019    HDL 42 02/12/2018     Lab Results   Component Value Date    LDLCALC 109.6 08/28/2019    LDLCALC 158.4 05/27/2019    LDLCALC 182.6 (H) 02/12/2018     Lab Results   Component Value Date    TRIG 92 08/28/2019    TRIG 113 05/27/2019    TRIG 132 02/12/2018     Lab Results   Component Value Date    CHOLHDL 26.0 08/28/2019    CHOLHDL 21.3 05/27/2019    CHOLHDL 16.7 (L) 02/12/2018

## 2020-09-03 ENCOUNTER — DOCUMENT SCAN (OUTPATIENT)
Dept: HOME HEALTH SERVICES | Facility: HOSPITAL | Age: 55
End: 2020-09-03
Payer: COMMERCIAL

## 2020-09-04 ENCOUNTER — HOSPITAL ENCOUNTER (OUTPATIENT)
Dept: WOUND CARE | Facility: HOSPITAL | Age: 55
Discharge: HOME | End: 2020-09-04
Attending: PODIATRIST
Payer: COMMERCIAL

## 2020-09-04 ENCOUNTER — TELEPHONE (OUTPATIENT)
Dept: WOUND CARE | Facility: HOSPITAL | Age: 55
End: 2020-09-04

## 2020-09-04 VITALS — SYSTOLIC BLOOD PRESSURE: 162 MMHG | TEMPERATURE: 98 F | HEART RATE: 87 BPM | DIASTOLIC BLOOD PRESSURE: 80 MMHG

## 2020-09-04 DIAGNOSIS — E11.9 TYPE 2 DIABETES MELLITUS WITHOUT COMPLICATION: ICD-10-CM

## 2020-09-04 DIAGNOSIS — E11.49 TYPE II DIABETES MELLITUS WITH NEUROLOGICAL MANIFESTATIONS: ICD-10-CM

## 2020-09-04 DIAGNOSIS — E11.621 DIABETIC ULCER OF LEFT FOOT ASSOCIATED WITH TYPE 2 DIABETES MELLITUS, WITH BONE INVOLVEMENT WITHOUT EVIDENCE OF NECROSIS: Primary | ICD-10-CM

## 2020-09-04 DIAGNOSIS — L97.526 DIABETIC ULCER OF LEFT FOOT ASSOCIATED WITH TYPE 2 DIABETES MELLITUS, WITH BONE INVOLVEMENT WITHOUT EVIDENCE OF NECROSIS: Primary | ICD-10-CM

## 2020-09-04 PROCEDURE — 11043 DBRDMT MUSC&/FSCA 1ST 20/<: CPT | Performed by: PODIATRIST

## 2020-09-04 PROCEDURE — 27201912 HC WOUND CARE DEBRIDEMENT SUPPLIES: Performed by: PODIATRIST

## 2020-09-04 PROCEDURE — 11042 WOUND DEBRIDEMENT: ICD-10-PCS | Mod: ,,, | Performed by: PODIATRIST

## 2020-09-04 PROCEDURE — 27201912 HC WOUND CARE DEBRIDEMENT SUPPLIES

## 2020-09-04 PROCEDURE — 11042 DBRDMT SUBQ TIS 1ST 20SQCM/<: CPT | Mod: ,,, | Performed by: PODIATRIST

## 2020-09-04 PROCEDURE — 11043 DBRDMT MUSC&/FSCA 1ST 20/<: CPT

## 2020-09-04 NOTE — PROGRESS NOTES
[facility]                Progress Note    Subjective:       Patient ID: Catalino Mcfadden is a 55 y.o. male.    Chief Complaint: No chief complaint on file.    Patient ambulated to exam bed without assistance or pain. Left Lateral Diabetic Ulcer with intact wound vac in place. Noted tape that was draped between 4th and 5th toes per Home Health Nurse. Patient instructed not to allow HH to do this again as he is high risk for additional breakdown, otherwise vac sponge in place appropriately. Patient seen on Wednesday of last week for change per HH. Patient was struggling with vac previously due to long tube and inability to get clothing off and on easily and go to the bathroom without tripping. Patient already shown how to unhook dressing from vac cannister to facilitate easier movements per Home Health Nurse and he states he understands he must re-hook himself back to suction as soon as possible. Also instructed patient to be sure to clamp tubing prior to unhooking and to un-clamp once re-attached to suction. No increased drainage, pain, or periwound redness. No malodor. Swelling with pitting edema +2 noted to LLE overall. Wound bed mostly granular; however, measuring slightly bigger overall with largest change in depth which went form 0.1 to 0.6 (cm) with a 0.6 (cm) tunnel in same area as greatest depth. Patient admits to walking a lot during the week for his [employer] business. Re-encouraged needing to elevate leg to reduce swelling and offload wound. Will continue with tubigrip medium compression to LLE. Dakins soak prior to debridement today.       Review of Systems   Constitutional: Negative for appetite change, chills and fever.   Respiratory: Negative for cough, shortness of breath and wheezing.    Cardiovascular: Positive for leg swelling. Negative for chest pain.   Gastrointestinal: Negative for diarrhea, nausea and vomiting.   Musculoskeletal: Positive for  arthralgias, joint swelling and myalgias.   Skin: Positive for wound.   Neurological: Positive for numbness and headaches. Negative for weakness.        + paresthesia          Objective:        Physical Exam  Vitals signs and nursing note reviewed.   Constitutional:       General: He is not in acute distress.     Appearance: He is not toxic-appearing or diaphoretic.      Comments: Pt. is well-developed, well-nourished, appears stated age, in no acute distress, alert and oriented x 3. No evidence of depression, anxiety, or agitation. Calm, cooperative, and communicative. Appropriate interactions and affect.   Cardiovascular:      Pulses:           Dorsalis pedis pulses are 2+ on the right side and 2+ on the left side.        Posterior tibial pulses are 1+ on the right side and 1+ on the left side.      Comments: There is decreased digital hair. Skin is atrophic, slightly hyperpigmented  Pulmonary:      Effort: No respiratory distress.   Musculoskeletal:      Right ankle: He exhibits normal range of motion and no swelling. No tenderness. No lateral malleolus, no medial malleolus, no AITFL, no CF ligament and no posterior TFL tenderness found. Achilles tendon exhibits no pain, no defect and normal Hilliard's test results.      Left ankle: He exhibits normal range of motion and no swelling. No tenderness. No lateral malleolus, no medial malleolus, no AITFL, no CF ligament and no posterior TFL tenderness found. Achilles tendon exhibits no pain, no defect and normal Hilliard's test results.      Right foot: No tenderness or bony tenderness.      Left foot: No tenderness or bony tenderness.      Comments: Decreased stride, station of gait.  apropulsive toe off.  Increased angle and base of gait.    There is equinus deformity bilateral with decreased dorsiflexion at the ankle joint bilateral. No tenderness with compression of heel. Negative tinels sign. Gait analysis reveals excessive pronation through midstance and  propulsion with early heel off.     Patient has hammertoes of digits 2-5 bilateral partially reducible     Decreased first MPJ range of motion both weightbearing and nonweightbearing, no crepitus observed the first MP joint, + dorsal flag sign.     Visible and palpable bunion without pain at dorsomedial 1st metatarsal head right and left.  Hallux abducted right and left partially reducible, tracks laterally without being track bound.  No ecchymosis, erythema, edema, or cardinal signs infection or signs of trauma same foot.    Fat pad atrophy to heels and met heads bilateral    Plantarflexed 1st ray RLE   Lymphadenopathy:      Comments: No lymphatic streaking    Negative lymphadenopathy bilateral popliteal fossa and tarsal tunnel.     Skin:     General: Skin is warm and dry.      Coloration: Skin is not pale.      Findings: Erythema and lesion (see wound description below) present. No abrasion, ecchymosis, laceration or rash.      Nails: There is no clubbing.        Comments: Toenails 1-5 bilaterally discolored/yellowed, dystrophic, brittle with subungual debris.    Neurological:      Sensory: Sensory deficit present.      Comments:  [place] 5.07 monofilamant testing is diminished Kp feet. Decreased/absent vibratory sensation bilateral feet to 128Hz tuning fork.     Paresthesias, and hyperesthesia bilateral feet with no clearly identified trigger or source.           Psychiatric:         Attention and Perception: He is attentive.         Mood and Affect: Mood is not anxious. Affect is not inappropriate.         Speech: He is communicative. Speech is not slurred.         Behavior: Behavior is not combative.           Vitals:    09/04/20 1012   BP: (!) 162/80   Pulse: 87   Temp: 97.8 °F (36.6 °C)       Assessment:           ICD-10-CM ICD-9-CM   1. 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  E11.621 250.80    L97.526 707.15             Negative Pressure Wound Therapy  08/21/20 1007 Left lateral (Active)   08/21/20 1007   Side: Left   Orientation: lateral   Location: Foot   Additional Comments:    Location:    SDO Location:    NPWT Type Vacuum Therapy 09/04/20 1000   Pressure Setting NPWT 125 mmHg 09/04/20 1000   Therapy Interventions NPWT Dressing changed 09/04/20 1000   Sponges Inserted NPWT Black;1 09/04/20 1000   Sponges Removed NPWT Black;1 09/04/20 1000   General Output (mL) 20 09/04/20 1000            Incision/Site 07/17/20 1251 Left Foot (Active)   07/17/20 1251   Present Prior to Hospital Arrival?:    Side: Left   Location: Foot   Orientation:    Incision Type:    Closure Method: Other (see comments)   Additional Comments:  open wound .betadine soak in 4x4's, abd, ace, castpadding, surgical boot   Removal Indication and Assessment:    Wound Outcome:    Removal Indications:    Wound Image   09/04/20 1000   Incision WDL ex 09/04/20 1000   Dressing Appearance Moist drainage 09/04/20 1000   Drainage Amount Moderate 09/04/20 1000   Drainage Characteristics/Odor Serosanguineous 09/04/20 1000   Appearance Red;Yellow;Slough 09/04/20 1000   Black (%), Wound Tissue Color 0 % 09/04/20 1000   Red (%), Wound Tissue Color 97 % 09/04/20 1000   Yellow (%), Wound Tissue Color 3 % 09/04/20 1000   Wound Edges Callused 09/04/20 1000   Wound Length (cm) 3 cm 09/04/20 1000   Wound Width (cm) 3.7 cm 09/04/20 1000   Wound Depth (cm) 0.6 cm 09/04/20 1000   Wound Volume (cm^3) 6.66 cm^3 09/04/20 1000   Wound Surface Area (cm^2) 11.1 cm^2 09/04/20 1000   Tunneling (depth (cm)/location) 0.6 (cm) at 3 o'clock with flipped foot 09/04/20 1000   Care Cleansed with:;Other (see comments) 09/04/20 1000   Dressing Changed 09/04/20 1000   Compression Tubular elasticized bandage 09/04/20 1000   Off Loading Football dressing 09/04/20 1000           Plan:            Greater than 50% of this visit spent on counseling and coordination of care.    Education about the prevention of  "limb loss.    Discussed wound healing cycle, skin integrity, ways to care for skin.Counseled patient on the effects of blood glucose on healing. He verbalizes understanding that it can increase the chances of delayed healing and this prolonged exposure leads to infection or progression of infection which subsequently can result in loss of limb.    The wound is cleansed of foreign material as much as possible and the base inspected for bone or abscess.  Base is fibrogranular,  bone no longer palpable to wound bed.  Patient already evaluated and treated by Infectious Disease with plans for Zosyn until September.  PICC line in place.      VAC orders for HH placed    VAC applied in clinic today      Wound Debridement    Date/Time: 9/4/2020 8:02 AM  Performed by: Aviva Martinez DPM  Authorized by: Aviva Martinez DPM     Time out: Immediately prior to procedure a "time out" was called to verify the correct patient, procedure, equipment, support staff and site/side marked as required.    Consent Done?:  Yes (Written)    Preparation: Patient was prepped and draped in usual sterile fashion    Local anesthesia used?: No      Wound Details:    Location:  Left foot    Location:  Left Midfoot    Type of Debridement:  Excisional       Length (cm):  3       Area (sq cm):  11.1       Width (cm):  3.7       Percent Debrided (%):  100       Depth (cm):  0.6       Total Area Debrided (sq cm):  11.1    Depth of debridement:  Subcutaneous tissue    Tissue debrided:  Epidermis, Dermis, Tendon and Subcutaneous    Devitalized tissue debrided:  Biofilm, Callus and Fibrin    Instruments:  Blade, Nippers and Curette    Bleeding:  Minimal  Hemostasis Achieved: Yes    Method Used:  Pressure  Patient tolerance:  Patient tolerated the procedure well with no immediate complications        Orders Placed This Encounter   Procedures    SUBSEQUENT HOME HEALTH ORDERS     Subsequent Home Health Orders    Current Medications:  Current Outpatient " "Medications:  amLODIPine (NORVASC) 10 MG tablet, Take 1 tablet (10 mg total) by mouth once daily., Disp: 90 tablet, Rfl: 3  atorvastatin (LIPITOR) 40 MG tablet, Take 1 tablet (40 mg total) by mouth once daily., Disp: 90 tablet, Rfl: 0  BASAGLAR KWIKPEN U-100 INSULIN glargine 100 units/mL (3mL) SubQ pen, ADM 20 UNI SC BID, Disp: , Rfl:   blood sugar diagnostic Strp, 1 strip by Misc.(Non-Drug; Combo Route) route 3 (three) times daily. Patient needs One Touch Test Strips (Berio)., Disp: 100 strip, Rfl: 11  blood sugar diagnostic Strp, 1 strip by Misc.(Non-Drug; Combo Route) route 3 (three) times daily. Patient needs One Touch Test Strips (Berio)., Disp: 100 strip, Rfl: 2  blood sugar diagnostic Strp, To check BG 2 times daily, to use with insurance preferred meter, Disp: 100 each, Rfl: 3  carvediloL (COREG) 25 MG tablet, Take 1 tablet (25 mg total) by mouth 2 (two) times daily., Disp: 60 tablet, Rfl: 11  dulaglutide (TRULICITY) 0.75 mg/0.5 mL pen injector, INJECT 0.5 ML UNDER THE SKIN EVERY 7 DAYS, Disp: 2 mL, Rfl: 5  furosemide (LASIX) 40 MG tablet, Take 1 tablet (40 mg total) by mouth once daily., Disp: 30 tablet, Rfl: 0  gabapentin (NEURONTIN) 300 MG capsule, Take 1 capsule (300 mg total) by mouth 2 (two) times daily., Disp: 60 capsule, Rfl: 11  hydrALAZINE (APRESOLINE) 100 MG tablet, Take 1 tablet (100 mg total) by mouth every 8 (eight) hours., Disp: 90 tablet, Rfl: 11  insulin aspart U-100 (NOVOLOG) 100 unit/mL (3 mL) InPn pen, Inject 5 Units into the skin 3 (three) times daily with meals., Disp: 13.5 mL, Rfl: 3  insulin detemir U-100 (LEVEMIR FLEXTOUCH) 100 unit/mL (3 mL) SubQ InPn pen, Inject 10 Units into the skin every evening., Disp: 9 mL, Rfl: 3  pen needle, diabetic 32 gauge x 3/16" Ndle, 1 each by Misc.(Non-Drug; Combo Route) route 5 (five) times daily., Disp: 100 each, Rfl: 10  piperacillin sodium/tazobactam (PIPERACILLIN-TAZOBACTAM 4.5G/100ML SODIUM CHLORIDE 0.9%-READY TO MIX), Inject 100 mLs (4.5 g " total) into the vein every 12 (twelve) hours., Disp:  , Rfl:   pulse oximeter (PULSE OXIMETER) device, by Apply Externally route 2 (two) times a day. Use twice daily at 8 AM and 3 PM and record the value in MyChart as directed., Disp: 1 each, Rfl: 0    No current facility-administered medications for this encounter.       Nursing:     Home Health to change wound vac dressing to Left Foot on Tuesday, September 8th, 2020.   Irrigate wound with saline. Prep periwound with cavilon, benzoin.   Apply endoform to wound bed/tunnel area, and eakins ring (usually for ostomies but works well as seal to prevent leaking). Window dressing around wound, side of foot and dorsal foot with transparent drape to protect periwound skin prior to applying black sponge to wound bed which then leads away to dorsal foot where suction pad will be placed with transparent drape placed prior to sponge to prevent skin breakdown. Cut dime sized hole to transparent drape overlying sponge prior to placing suction pad to dorsal foot. Please do not place tape between 4th and 5th toes as this may cause breakdown.   Apply marycarmen foam long x 3 perpendicular to plantar foot and football dressing with cast padding x 3. Secure all with single layer tubigrip medium compression (note - hole cut at ankle to threat suction tubing through). Suction set to 125 mmHg low continuous.     Order Specific Question:   What Home Health Agency is the patient currently using?     Answer:   [facility]    Change dressing     Gentian violet periwound/cavilon, endoform to wound bed and packed to tunnel, eakins ring, wound vac (black:1), jaleel foam x2, fluffy football dressing (cast padding X3), single layer medium gradient tubigrip. Suction at 125 mmHg low continuous.        Follow up in about 1 week (around 9/11/2020) for wound care.

## 2020-09-07 ENCOUNTER — PATIENT MESSAGE (OUTPATIENT)
Dept: FAMILY MEDICINE | Facility: CLINIC | Age: 55
End: 2020-09-07

## 2020-09-07 DIAGNOSIS — N18.4 TYPE 2 DIABETES MELLITUS WITH STAGE 4 CHRONIC KIDNEY DISEASE, WITH LONG-TERM CURRENT USE OF INSULIN: ICD-10-CM

## 2020-09-07 DIAGNOSIS — Z79.4 TYPE 2 DIABETES MELLITUS WITH STAGE 4 CHRONIC KIDNEY DISEASE, WITH LONG-TERM CURRENT USE OF INSULIN: ICD-10-CM

## 2020-09-07 DIAGNOSIS — E11.22 TYPE 2 DIABETES MELLITUS WITH STAGE 4 CHRONIC KIDNEY DISEASE, WITH LONG-TERM CURRENT USE OF INSULIN: ICD-10-CM

## 2020-09-08 RX ORDER — DULAGLUTIDE 0.75 MG/.5ML
INJECTION, SOLUTION SUBCUTANEOUS
Qty: 2 ML | Refills: 5 | Status: SHIPPED | OUTPATIENT
Start: 2020-09-08 | End: 2020-10-26 | Stop reason: SDUPTHER

## 2020-09-09 ENCOUNTER — DOCUMENT SCAN (OUTPATIENT)
Dept: HOME HEALTH SERVICES | Facility: HOSPITAL | Age: 55
End: 2020-09-09
Payer: COMMERCIAL

## 2020-09-11 ENCOUNTER — HOSPITAL ENCOUNTER (OUTPATIENT)
Dept: WOUND CARE | Facility: HOSPITAL | Age: 55
Discharge: HOME | End: 2020-09-11
Attending: PODIATRIST
Payer: COMMERCIAL

## 2020-09-11 VITALS
HEART RATE: 81 BPM | SYSTOLIC BLOOD PRESSURE: 152 MMHG | RESPIRATION RATE: 17 BRPM | TEMPERATURE: 97 F | DIASTOLIC BLOOD PRESSURE: 84 MMHG

## 2020-09-11 DIAGNOSIS — L97.426 DIABETIC ULCER OF LEFT MIDFOOT ASSOCIATED WITH TYPE 2 DIABETES MELLITUS, WITH BONE INVOLVEMENT WITHOUT EVIDENCE OF NECROSIS: Primary | ICD-10-CM

## 2020-09-11 DIAGNOSIS — E11.49 TYPE II DIABETES MELLITUS WITH NEUROLOGICAL MANIFESTATIONS: ICD-10-CM

## 2020-09-11 DIAGNOSIS — E11.621 DIABETIC ULCER OF LEFT MIDFOOT ASSOCIATED WITH TYPE 2 DIABETES MELLITUS, WITH BONE INVOLVEMENT WITHOUT EVIDENCE OF NECROSIS: Primary | ICD-10-CM

## 2020-09-11 PROCEDURE — 97605 NEG PRS WND THER DME<=50SQCM: CPT | Performed by: PODIATRIST

## 2020-09-11 PROCEDURE — 11042 WOUND DEBRIDEMENT: ICD-10-PCS | Mod: ,,, | Performed by: PODIATRIST

## 2020-09-11 PROCEDURE — 27201912 HC WOUND CARE DEBRIDEMENT SUPPLIES: Performed by: PODIATRIST

## 2020-09-11 PROCEDURE — 99499 UNLISTED E&M SERVICE: CPT | Mod: ,,, | Performed by: PODIATRIST

## 2020-09-11 PROCEDURE — 11042 DBRDMT SUBQ TIS 1ST 20SQCM/<: CPT | Performed by: PODIATRIST

## 2020-09-11 PROCEDURE — 99499 NO LOS: ICD-10-PCS | Mod: ,,, | Performed by: PODIATRIST

## 2020-09-11 PROCEDURE — 11042 DBRDMT SUBQ TIS 1ST 20SQCM/<: CPT | Mod: ,,, | Performed by: PODIATRIST

## 2020-09-11 NOTE — PROGRESS NOTES
[facility]                Progress Note    Subjective:       Patient ID: Catalino Mcfadden is a 55 y.o. male.    Chief Complaint: Non-healing Wound Follow Up    09/11/2020: F/U visit. He has had home health visit since last encounter, he states he does not have enough confidence in HH having needed supplies to go more than one week away from wound clinic. Wound improving. Discussed applying neox to wound bed for future treatment.      Review of Systems   Constitutional: Negative for appetite change, chills and fever.   Respiratory: Negative for cough, shortness of breath and wheezing.    Cardiovascular: Positive for leg swelling. Negative for chest pain.   Gastrointestinal: Negative for diarrhea, nausea and vomiting.   Musculoskeletal: Positive for arthralgias, joint swelling and myalgias.   Skin: Positive for wound.   Neurological: Positive for numbness and headaches. Negative for weakness.        + paresthesia          Objective:        Physical Exam  Vitals signs and nursing note reviewed.   Constitutional:       General: He is not in acute distress.     Appearance: He is not toxic-appearing or diaphoretic.      Comments: Pt. is well-developed, well-nourished, appears stated age, in no acute distress, alert and oriented x 3. No evidence of depression, anxiety, or agitation. Calm, cooperative, and communicative. Appropriate interactions and affect.   Cardiovascular:      Pulses:           Dorsalis pedis pulses are 2+ on the right side and 2+ on the left side.        Posterior tibial pulses are 1+ on the right side and 1+ on the left side.      Comments: There is decreased digital hair. Skin is atrophic, slightly hyperpigmented  Pulmonary:      Effort: No respiratory distress.   Musculoskeletal:      Right ankle: He exhibits normal range of motion and no swelling. No tenderness. No lateral malleolus, no medial malleolus, no AITFL, no CF ligament and no posterior TFL  tenderness found. Achilles tendon exhibits no pain, no defect and normal Hilliard's test results.      Left ankle: He exhibits normal range of motion and no swelling. No tenderness. No lateral malleolus, no medial malleolus, no AITFL, no CF ligament and no posterior TFL tenderness found. Achilles tendon exhibits no pain, no defect and normal Hilliard's test results.      Right foot: No tenderness or bony tenderness.      Left foot: No tenderness or bony tenderness.      Comments: Decreased stride, station of gait.  apropulsive toe off.  Increased angle and base of gait.    There is equinus deformity bilateral with decreased dorsiflexion at the ankle joint bilateral. No tenderness with compression of heel. Negative tinels sign. Gait analysis reveals excessive pronation through midstance and propulsion with early heel off.     Patient has hammertoes of digits 2-5 bilateral partially reducible     Decreased first MPJ range of motion both weightbearing and nonweightbearing, no crepitus observed the first MP joint, + dorsal flag sign.     Visible and palpable bunion without pain at dorsomedial 1st metatarsal head right and left.  Hallux abducted right and left partially reducible, tracks laterally without being track bound.  No ecchymosis, erythema, edema, or cardinal signs infection or signs of trauma same foot.    Fat pad atrophy to heels and met heads bilateral    Plantarflexed 1st ray RLE   Lymphadenopathy:      Comments: No lymphatic streaking    Negative lymphadenopathy bilateral popliteal fossa and tarsal tunnel.     Skin:     General: Skin is warm and dry.      Coloration: Skin is not pale.      Findings: Erythema and lesion (see wound description below) present. No abrasion, ecchymosis, laceration or rash.      Nails: There is no clubbing.        Comments: Toenails 1-5 bilaterally discolored/yellowed, dystrophic, brittle with subungual debris.    Neurological:      Sensory: Sensory deficit present.       Comments:  [place] 5.07 monofilamant testing is diminished Kp feet. Decreased/absent vibratory sensation bilateral feet to 128Hz tuning fork.     Paresthesias, and hyperesthesia bilateral feet with no clearly identified trigger or source.           Psychiatric:         Attention and Perception: He is attentive.         Mood and Affect: Mood is not anxious. Affect is not inappropriate.         Speech: He is communicative. Speech is not slurred.         Behavior: Behavior is not combative.           Vitals:    09/11/20 1013   BP: (!) 152/84   Pulse: 81   Resp: 17   Temp: 96.8 °F (36 °C)       Assessment:           ICD-10-CM ICD-9-CM   1. Diabetic ulcer of left midfoot associated with type 2 diabetes mellitus, with bone involvement without evidence of necrosis  E11.621 250.80    L97.426 707.14   2. Type II diabetes mellitus with neurological manifestations  E11.49 250.60            Negative Pressure Wound Therapy  08/21/20 1007 Left lateral (Active)   08/21/20 1007   Side: Left   Orientation: lateral   Location: Foot   Additional Comments:    Location:    SDO Location:    NPWT Type Vacuum Therapy 09/11/20 1000   Therapy Setting NPWT Continuous therapy 09/11/20 1000   Pressure Setting NPWT 125 mmHg 09/11/20 1000   Therapy Interventions NPWT Canister changed;Dressing changed;Seal intact 09/11/20 1000   Sponges Inserted NPWT Black;1 09/11/20 1000   Sponges Removed NPWT Black;1 09/11/20 1000   General Output (mL) 25 09/11/20 1000            Incision/Site 07/17/20 1251 Left Foot (Active)   07/17/20 1251   Present Prior to Hospital Arrival?:    Side: Left   Location: Foot   Orientation:    Incision Type:    Closure Method: Other (see comments)   Additional Comments:  open wound .betadine soak in 4x4's, abd, ace, castpadding, surgical boot   Removal Indication and Assessment:    Wound Outcome:    Removal Indications:    Wound Image   09/11/20 1000   Incision WDL WDL 09/11/20 1000   Dressing Appearance Intact;Moist  drainage 09/11/20 1000   Drainage Amount Moderate 09/11/20 1000   Drainage Characteristics/Odor Serosanguineous 09/11/20 1000   Appearance Red;White 09/11/20 1000   Black (%), Wound Tissue Color 0 % 09/11/20 1000   Red (%), Wound Tissue Color 100 % 09/11/20 1000   Yellow (%), Wound Tissue Color 0 % 09/11/20 1000   Periwound Area Macerated 09/11/20 1000   Wound Edges Defined 09/11/20 1000   Wound Length (cm) 3.2 cm 09/11/20 1000   Wound Width (cm) 3.8 cm 09/11/20 1000   Wound Depth (cm) 0.4 cm 09/11/20 1000   Wound Volume (cm^3) 4.86 cm^3 09/11/20 1000   Wound Surface Area (cm^2) 12.16 cm^2 09/11/20 1000   Tunneling (depth (cm)/location) 0 09/11/20 1000   Undermining (depth (cm)/location) 0 09/11/20 1000   Care Soap and water;Sterile normal saline;Wound cleanser 09/11/20 1000   Dressing Applied;Collagen;Other (see comments);Foam 09/11/20 1000   Compression Tubular elasticized bandage 09/11/20 1000   Off Loading Football dressing 09/11/20 1000   Dressing Change Due 09/18/20 09/11/20 1000           Plan:              Greater than 50% of this visit spent on counseling and coordination of care.    Education about the prevention of limb loss.    Discussed wound healing cycle, skin integrity, ways to care for skin.Counseled patient on the effects of blood glucose on healing. He verbalizes understanding that it can increase the chances of delayed healing and this prolonged exposure leads to infection or progression of infection which subsequently can result in loss of limb.    The wound is cleansed of foreign material as much as possible and the base inspected for bone or abscess.  Base is fibrogranular,  bone no longer palpable to wound bed.  Patient already evaluated and treated by Infectious Disease with plans for Zosyn until September.  PICC line in place.      Wound showing very slight improvement, however he would benefit from skin substitute to aid in granulation to epithelium    VAC orders for  placed    VAC  "applied in clinic today    Debridement note as follows      Wound Debridement    Date/Time: 9/11/2020 10:00 AM  Performed by: Aviva Martinez DPM  Authorized by: Aviva Martinez DPM     Time out: Immediately prior to procedure a "time out" was called to verify the correct patient, procedure, equipment, support staff and site/side marked as required.    Consent Done?:  Yes (Written)    Preparation: Patient was prepped and draped in usual sterile fashion    Local anesthesia used?: No      Wound Details:    Location:  Left foot    Location:  Left Midfoot (plantar)    Type of Debridement:  Excisional       Length (cm):  3.2       Area (sq cm):  12.16       Width (cm):  3.8       Percent Debrided (%):  100       Depth (cm):  0.4       Total Area Debrided (sq cm):  12.16    Depth of debridement:  Subcutaneous tissue    Tissue debrided:  Epidermis, Dermis and Subcutaneous    Devitalized tissue debrided:  Fibrin, Callus and Biofilm    Instruments:  Curette and Blade    Bleeding:  Minimal  Hemostasis Achieved: Yes    Method Used:  Pressure  Patient tolerance:  Patient tolerated the procedure well with no immediate complications          Orders Placed This Encounter   Procedures    Change dressing     Benzoin, endoform to wound bed, eakins ring, [place] (black:1), jaleel foam long X2 perpendicular, football dressing (cast padding X3), single layer medium gradient tubigrip size F.    HOME HEALTH ORDERS     Subsequent Home Health Orders    Current Medications:  Current Outpatient Medications:  amLODIPine (NORVASC) 10 MG tablet, Take 1 tablet (10 mg total) by mouth once daily., Disp: 90 tablet, Rfl: 3  atorvastatin (LIPITOR) 40 MG tablet, Take 1 tablet (40 mg total) by mouth once daily., Disp: 90 tablet, Rfl: 0  BASAGLAR KWIKPEN U-100 INSULIN glargine 100 units/mL (3mL) SubQ pen, ADM 20 UNI SC BID, Disp: , Rfl:   blood sugar diagnostic Strp, 1 strip by Misc.(Non-Drug; Combo Route) route 3 (three) times daily. Patient needs One Touch " "Test Strips (Berio)., Disp: 100 strip, Rfl: 11  blood sugar diagnostic Strp, 1 strip by Misc.(Non-Drug; Combo Route) route 3 (three) times daily. Patient needs One Touch Test Strips (Berio)., Disp: 300 strip, Rfl: 2  carvediloL (COREG) 25 MG tablet, Take 1 tablet (25 mg total) by mouth 2 (two) times daily., Disp: 60 tablet, Rfl: 11  dulaglutide (TRULICITY) 0.75 mg/0.5 mL pen injector, INJECT 0.5 ML UNDER THE SKIN EVERY 7 DAYS, Disp: 2 mL, Rfl: 5  furosemide (LASIX) 40 MG tablet, Take 1 tablet (40 mg total) by mouth once daily., Disp: 30 tablet, Rfl: 0  gabapentin (NEURONTIN) 300 MG capsule, Take 1 capsule (300 mg total) by mouth 2 (two) times daily., Disp: 60 capsule, Rfl: 11  hydrALAZINE (APRESOLINE) 100 MG tablet, Take 1 tablet (100 mg total) by mouth every 8 (eight) hours., Disp: 90 tablet, Rfl: 11  insulin aspart U-100 (NOVOLOG) 100 unit/mL (3 mL) InPn pen, Inject 5 Units into the skin 3 (three) times daily with meals., Disp: 13.5 mL, Rfl: 3  insulin detemir U-100 (LEVEMIR FLEXTOUCH) 100 unit/mL (3 mL) SubQ InPn pen, Inject 10 Units into the skin every evening., Disp: 9 mL, Rfl: 3  pen needle, diabetic 32 gauge x 3/16" Ndle, 1 each by Misc.(Non-Drug; Combo Route) route 5 (five) times daily., Disp: 100 each, Rfl: 10  piperacillin sodium/tazobactam (PIPERACILLIN-TAZOBACTAM 4.5G/100ML SODIUM CHLORIDE 0.9%-READY TO MIX), Inject 100 mLs (4.5 g total) into the vein every 12 (twelve) hours., Disp:  , Rfl:   pulse oximeter (PULSE OXIMETER) device, by Apply Externally route 2 (two) times a day. Use twice daily at 8 AM and 3 PM and record the value in [facility] as directed., Disp: 1 each, Rfl: 0    No current facility-administered medications for this encounter.       Nursing:   Home Health to change wound vac dressing to Left Foot on Tuesday, September 15th, 2020.   Irrigate wound with saline. Prep periwound with cavilon, benzoin. Apply endoform to wound bed, and eakins ring (usually for ostomies but works well as seal to " prevent leaking). Window dressing around wound, side of foot and dorsal foot with transparent drape to protect periwound skin prior to applying black sponge to wound bed which then leads away to dorsal foot where suction pad will be placed. Cut dime sized hole prior to placing suction pad to dorsal foot. Apply marycarmen foam long x 3 perpendicular to plantar foot and football dressing with cast padding x 3. Secure all with single layer tubigrip medium size E (note - hole cut at ankle to threat suction tubing through). Suction set to 125 mmHg low continuous.     Order Specific Question:   What Home Health Agency is the patient currently using?     Answer:   [facility]        No follow-ups on file.

## 2020-09-14 ENCOUNTER — OFFICE VISIT (OUTPATIENT)
Dept: FAMILY MEDICINE | Facility: CLINIC | Age: 55
End: 2020-09-14
Payer: COMMERCIAL

## 2020-09-14 ENCOUNTER — DOCUMENT SCAN (OUTPATIENT)
Dept: HOME HEALTH SERVICES | Facility: HOSPITAL | Age: 55
End: 2020-09-14
Payer: COMMERCIAL

## 2020-09-14 DIAGNOSIS — I10 ESSENTIAL HYPERTENSION: Chronic | ICD-10-CM

## 2020-09-14 DIAGNOSIS — L97.426 DIABETIC ULCER OF LEFT MIDFOOT ASSOCIATED WITH TYPE 2 DIABETES MELLITUS, WITH BONE INVOLVEMENT WITHOUT EVIDENCE OF NECROSIS: Primary | ICD-10-CM

## 2020-09-14 DIAGNOSIS — Z79.4 TYPE 2 DIABETES MELLITUS WITH STAGE 4 CHRONIC KIDNEY DISEASE, WITH LONG-TERM CURRENT USE OF INSULIN: Primary | ICD-10-CM

## 2020-09-14 DIAGNOSIS — E11.621 DIABETIC ULCER OF LEFT MIDFOOT ASSOCIATED WITH TYPE 2 DIABETES MELLITUS, WITH BONE INVOLVEMENT WITHOUT EVIDENCE OF NECROSIS: Primary | ICD-10-CM

## 2020-09-14 DIAGNOSIS — N18.4 TYPE 2 DIABETES MELLITUS WITH STAGE 4 CHRONIC KIDNEY DISEASE, WITH LONG-TERM CURRENT USE OF INSULIN: Primary | ICD-10-CM

## 2020-09-14 DIAGNOSIS — L97.426 DIABETIC ULCER OF LEFT MIDFOOT ASSOCIATED WITH TYPE 2 DIABETES MELLITUS, WITH BONE INVOLVEMENT WITHOUT EVIDENCE OF NECROSIS: ICD-10-CM

## 2020-09-14 DIAGNOSIS — E11.621 DIABETIC ULCER OF LEFT MIDFOOT ASSOCIATED WITH TYPE 2 DIABETES MELLITUS, WITH BONE INVOLVEMENT WITHOUT EVIDENCE OF NECROSIS: ICD-10-CM

## 2020-09-14 DIAGNOSIS — E11.22 TYPE 2 DIABETES MELLITUS WITH STAGE 4 CHRONIC KIDNEY DISEASE, WITH LONG-TERM CURRENT USE OF INSULIN: Primary | ICD-10-CM

## 2020-09-14 PROCEDURE — 3052F PR MOST RECENT HEMOGLOBIN A1C LEVEL 8.0 - < 9.0%: ICD-10-PCS | Mod: CPTII,,, | Performed by: FAMILY MEDICINE

## 2020-09-14 PROCEDURE — 99214 PR OFFICE/OUTPT VISIT, EST, LEVL IV, 30-39 MIN: ICD-10-PCS | Mod: 95,,, | Performed by: FAMILY MEDICINE

## 2020-09-14 PROCEDURE — 99214 OFFICE O/P EST MOD 30 MIN: CPT | Mod: 95,,, | Performed by: FAMILY MEDICINE

## 2020-09-14 PROCEDURE — 3052F HG A1C>EQUAL 8.0%<EQUAL 9.0%: CPT | Mod: CPTII,,, | Performed by: FAMILY MEDICINE

## 2020-09-14 RX ORDER — INSULIN PUMP SYRINGE, 3 ML
EACH MISCELLANEOUS
Qty: 1 EACH | Refills: 0 | Status: SHIPPED | OUTPATIENT
Start: 2020-09-14 | End: 2022-02-23 | Stop reason: ALTCHOICE

## 2020-09-14 NOTE — PROGRESS NOTES
Catalino Mcfadden is a 55 y.o. male.      Visit type: Virtual visit with synchronous audio and video  The patient is located outside of this physical clinic but within the State of [place], and a thorough physical exam was not performed.  The chief complaint was presented by patient and discussed during visit and is documented below.    100% of visit was face to face counseling, and total visit lasted 20 minutes.     Each patient provided medical services by telemedicine is:  (1) informed of the relationship between the physician and patient and the respective role of any other health care provider with respect to management of the patient; and (2) notified that he or she may decline to receive medical services by telemedicine and may withdraw from such care at any time.    Notes:    Hypertension  This is a chronic problem. The current episode started more than 1 year ago. The problem is unchanged. The problem is controlled. Associated symptoms include headaches (occasional), neck pain (improved) and shortness of breath (resolved). Pertinent negatives include no anxiety, blurred vision, chest pain, palpitations, peripheral edema or sweats. There are no associated agents to hypertension. Risk factors for coronary artery disease include diabetes mellitus, dyslipidemia, male gender and obesity. Past treatments include beta blockers, calcium channel blockers, diuretics and ACE inhibitors. The current treatment provides moderate improvement. There are no compliance problems.  Hypertensive end-organ damage includes kidney disease and retinopathy. There is no history of angina, CAD/MI, CVA, heart failure, left ventricular hypertrophy or PVD. Identifiable causes of hypertension include chronic renal disease. There is no history of coarctation of the aorta, hyperaldosteronism, hypercortisolism, hyperparathyroidism, a hypertension causing med, pheochromocytoma, renovascular disease, sleep apnea or a thyroid problem.    Diabetes  He presents for his follow-up diabetic visit. He has type 2 diabetes mellitus. No MedicAlert identification noted. The initial diagnosis of diabetes was made 20 years ago. His disease course has been improving. Hypoglycemia symptoms include headaches (occasional). Pertinent negatives for hypoglycemia include no confusion, dizziness, hunger, mood changes, nervousness/anxiousness, pallor, seizures, sleepiness, speech difficulty, sweats or tremors. Associated symptoms include foot paresthesias. Pertinent negatives for diabetes include no blurred vision, no chest pain, no fatigue, no foot ulcerations, no polydipsia, no polyphagia, no polyuria, no visual change, no weakness and no weight loss. There are no hypoglycemic complications. Pertinent negatives for hypoglycemia complications include no blackouts, no hospitalization, no nocturnal hypoglycemia, no required assistance and no required glucagon injection. Symptoms are improving. Diabetic complications include nephropathy, peripheral neuropathy and retinopathy. Pertinent negatives for diabetic complications include no autonomic neuropathy, CVA, heart disease or PVD. Risk factors for coronary artery disease include hypertension, obesity, sedentary lifestyle and diabetes mellitus. Current diabetic treatment includes diet and oral agent (dual therapy). He is compliant with treatment all of the time. His weight is decreasing steadily. He is following a generally healthy, low fat/cholesterol and low salt diet. Meal planning includes avoidance of concentrated sweets. He has not had a previous visit with a dietitian. He participates in exercise intermittently. He monitors blood glucose at home 1-2 x per day. He monitors urine at home <1 x per month. Blood glucose monitoring compliance is fair. His home blood glucose trend is decreasing steadily. An ACE inhibitor/angiotensin II receptor blocker is being taken. He sees a podiatrist.Eye exam is not current.         ROS  Review of Systems   Constitutional: Negative.  Negative for activity change, appetite change, chills, diaphoresis, fatigue, fever, unexpected weight change and weight loss.   HENT: Negative.  Negative for congestion, ear pain, hearing loss, nosebleeds, postnasal drip, rhinorrhea, sinus pressure, sneezing, sore throat and trouble swallowing.    Eyes: Negative for blurred vision, pain, discharge and visual disturbance.   Respiratory: Positive for shortness of breath (resolved). Negative for cough, choking, chest tightness and wheezing.    Cardiovascular: Negative for chest pain, palpitations and leg swelling.   Gastrointestinal: Negative for abdominal pain, blood in stool, constipation, diarrhea, nausea and vomiting.   Endocrine: Negative for polydipsia, polyphagia and polyuria.   Genitourinary: Negative for difficulty urinating, dysuria, frequency, hematuria and urgency.   Musculoskeletal: Positive for gait problem and neck pain (improved). Negative for arthralgias, back pain, joint swelling and myalgias.   Skin: Positive for wound (healing; wound vac in place). Negative for color change, pallor and rash.   Allergic/Immunologic: Negative for environmental allergies and food allergies.   Neurological: Positive for headaches (occasional). Negative for dizziness, tremors, seizures, syncope, speech difficulty, weakness and light-headedness.   Psychiatric/Behavioral: Negative.  Negative for confusion, decreased concentration, dysphoric mood and sleep disturbance. The patient is not nervous/anxious.        Assessment & Plan    Discussion of plan of care including treatment options regarding health and wellness were reviewed and discussed with patient.  Any changes to medication or treatment plan, as well as any screening blood test, imaging, or referrals to specialist, are documented.  Follow up as indicated.     1. Type 2 diabetes mellitus with stage 4 chronic kidney disease, with long-term current use of  insulin  Patient is encouraged to follow a diet low in carbohydrates and simple sugars.  Discussed simple vs. complex carbohydrates as well as eating times of certain meals. Advised to focus on good food choices and increased physical activity and encouraged to adhere to medication regimen and/or lifestyle adjustments, and to check glucose level as recommended.  Contact office if glucose levels are not improving over time.  Will monitor HbA1c appropriately.  Kidney function is improving.  - blood-glucose meter kit; To check BG 3 times daily, to use with insurance preferred meter; Accu check.  Dispense: 1 each; Refill: 0    2. Essential hypertension  Patient was counseled and encouraged to maintain a low sodium diet, as well as increasing physical activity.  Recommend random BP checks at home on a regular basis.  Repeat BP at end of visit was not necessary. Will continue medication at this time, and follow up in 3-6 months, or sooner if blood pressure begins to increase.  Decrease carvedilol to once daily.  Continue hydralazine; follow up with Cardiology.      3. Diabetic ulcer of left midfoot associated with type 2 diabetes mellitus, with bone involvement without evidence of necrosis  Managed by Podiatry and Wound Care.  Healing well.       Follow up in about 5 weeks (around 10/19/2020).        ACTIVE MEDICAL ISSUES:  Documented in Problem List    PAST MEDICAL HISTORY  Documented    PAST SURGICAL HISTORY:  Documented    SOCIAL HISTORY:  Documented    FAMILY HISTORY:  Documented    ALLERGIES AND MEDICATIONS: updated and reviewed.  Documented    Health Maintenance       Date Due Completion Date    HIV Screening 04/04/1979 ---    TETANUS VACCINE 04/04/1982 ---    Shingles Vaccine (1 of 2) 04/04/2014 ---    Eye Exam 04/03/2019 4/3/2018    Colorectal Cancer Screening 06/03/2020 6/3/2019    Override on 9/26/2014: Done (future)    Influenza Vaccine (1) 08/01/2020 3/10/2020    Lipid Panel 08/28/2020 8/28/2019    Hemoglobin  A1c 10/17/2020 7/17/2020    Foot Exam 08/04/2021 8/4/2020    Override on 5/25/2020: Done    Override on 4/27/2020: Done    Override on 4/20/2020: Done    Low Dose Statin 09/01/2021 9/1/2020

## 2020-09-18 ENCOUNTER — HOSPITAL ENCOUNTER (OUTPATIENT)
Dept: WOUND CARE | Facility: HOSPITAL | Age: 55
Discharge: HOME | End: 2020-09-18
Attending: PODIATRIST
Payer: COMMERCIAL

## 2020-09-18 VITALS
DIASTOLIC BLOOD PRESSURE: 72 MMHG | RESPIRATION RATE: 17 BRPM | HEART RATE: 86 BPM | SYSTOLIC BLOOD PRESSURE: 135 MMHG | TEMPERATURE: 99 F

## 2020-09-18 DIAGNOSIS — E11.621 DIABETIC ULCER OF LEFT FOOT ASSOCIATED WITH TYPE 2 DIABETES MELLITUS, WITH BONE INVOLVEMENT WITHOUT EVIDENCE OF NECROSIS: Primary | ICD-10-CM

## 2020-09-18 DIAGNOSIS — L97.526 DIABETIC ULCER OF LEFT FOOT ASSOCIATED WITH TYPE 2 DIABETES MELLITUS, WITH BONE INVOLVEMENT WITHOUT EVIDENCE OF NECROSIS: Primary | ICD-10-CM

## 2020-09-18 PROCEDURE — 15275 SKIN SUB GRAFT FACE/NK/HF/G: CPT | Mod: ,,, | Performed by: PODIATRIST

## 2020-09-18 PROCEDURE — 15275 PR SKIN SUB GRAFT FACE/NK/HF/G UP TO 100 SQCM: ICD-10-PCS | Mod: ,,, | Performed by: PODIATRIST

## 2020-09-18 PROCEDURE — 15275 SKIN SUB GRAFT FACE/NK/HF/G: CPT | Performed by: PODIATRIST

## 2020-09-18 PROCEDURE — 99499 NO LOS: ICD-10-PCS | Mod: ,,, | Performed by: PODIATRIST

## 2020-09-18 PROCEDURE — 97605 NEG PRS WND THER DME<=50SQCM: CPT | Mod: 59 | Performed by: PODIATRIST

## 2020-09-18 PROCEDURE — 99499 UNLISTED E&M SERVICE: CPT | Mod: ,,, | Performed by: PODIATRIST

## 2020-09-18 NOTE — PROGRESS NOTES
[facility]                Progress Note    Subjective:       Patient ID: Catalino Mcfadden is a 55 y.o. male.    Chief Complaint: Non-healing Wound Follow Up and Diabetic Foot Ulcer    09/18/2020: F/U visit.  Patient has had a home health VAC dressing change since our last encounter.  He has no new pedal complaints.      Review of Systems   Constitutional: Negative for appetite change, chills and fever.   Respiratory: Negative for cough, shortness of breath and wheezing.    Cardiovascular: Positive for leg swelling. Negative for chest pain.   Gastrointestinal: Negative for diarrhea, nausea and vomiting.   Musculoskeletal: Positive for arthralgias, joint swelling and myalgias.   Skin: Positive for wound.   Neurological: Positive for numbness and headaches. Negative for weakness.        + paresthesia          Objective:        Physical Exam  Vitals signs and nursing note reviewed.   Constitutional:       General: He is not in acute distress.     Appearance: He is not toxic-appearing or diaphoretic.      Comments: Pt. is well-developed, well-nourished, appears stated age, in no acute distress, alert and oriented x 3. No evidence of depression, anxiety, or agitation. Calm, cooperative, and communicative. Appropriate interactions and affect.   Cardiovascular:      Pulses:           Dorsalis pedis pulses are 2+ on the right side and 2+ on the left side.        Posterior tibial pulses are 1+ on the right side and 1+ on the left side.      Comments: There is decreased digital hair. Skin is atrophic, slightly hyperpigmented  Pulmonary:      Effort: No respiratory distress.   Musculoskeletal:      Right ankle: He exhibits normal range of motion and no swelling. No tenderness. No lateral malleolus, no medial malleolus, no AITFL, no CF ligament and no posterior TFL tenderness found. Achilles tendon exhibits no pain, no defect and normal Hilliard's test results.      Left ankle: He  exhibits normal range of motion and no swelling. No tenderness. No lateral malleolus, no medial malleolus, no AITFL, no CF ligament and no posterior TFL tenderness found. Achilles tendon exhibits no pain, no defect and normal Hilliard's test results.      Right foot: No tenderness or bony tenderness.      Left foot: No tenderness or bony tenderness.      Comments: Decreased stride, station of gait.  apropulsive toe off.  Increased angle and base of gait.    There is equinus deformity bilateral with decreased dorsiflexion at the ankle joint bilateral. No tenderness with compression of heel. Negative tinels sign. Gait analysis reveals excessive pronation through midstance and propulsion with early heel off.     Patient has hammertoes of digits 2-5 bilateral partially reducible     Decreased first MPJ range of motion both weightbearing and nonweightbearing, no crepitus observed the first MP joint, + dorsal flag sign.     Visible and palpable bunion without pain at dorsomedial 1st metatarsal head right and left.  Hallux abducted right and left partially reducible, tracks laterally without being track bound.  No ecchymosis, erythema, edema, or cardinal signs infection or signs of trauma same foot.    Fat pad atrophy to heels and met heads bilateral    Plantarflexed 1st ray RLE   Lymphadenopathy:      Comments: No lymphatic streaking    Negative lymphadenopathy bilateral popliteal fossa and tarsal tunnel.     Skin:     General: Skin is warm and dry.      Coloration: Skin is not pale.      Findings: Erythema and lesion (see wound description below) present. No abrasion, ecchymosis, laceration or rash.      Nails: There is no clubbing.        Comments: Toenails 1-5 bilaterally discolored/yellowed, dystrophic, brittle with subungual debris.    Neurological:      Sensory: Sensory deficit present.      Comments:  Kitzmiller-Dayton 5.07 monofilamant testing is diminished Kp feet. Decreased/absent vibratory sensation bilateral  feet to 128Hz tuning fork.     Paresthesias, and hyperesthesia bilateral feet with no clearly identified trigger or source.           Psychiatric:         Attention and Perception: He is attentive.         Mood and Affect: Mood is not anxious. Affect is not inappropriate.         Speech: He is communicative. Speech is not slurred.         Behavior: Behavior is not combative.         Vitals:    09/18/20 1002   BP: 135/72   Pulse: 86   Resp: 17   Temp: 98.9 °F (37.2 °C)       Assessment:           ICD-10-CM ICD-9-CM   1. 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  E11.621 250.80    L97.526 707.15            Negative Pressure Wound Therapy  08/21/20 1007 Left lateral (Active)   08/21/20 1007   Side: Left   Orientation: lateral   Location: Foot   Additional Comments:    Location:    SDO Location:    NPWT Type Vacuum Therapy 09/18/20 1000   Therapy Setting NPWT Continuous therapy 09/18/20 1000   Pressure Setting NPWT 125 mmHg 09/18/20 1000   Therapy Interventions NPWT Canister changed;Dressing changed;Seal intact 09/18/20 1000   Sponges Inserted NPWT Black;1 09/18/20 1000   Sponges Removed NPWT Black;2 09/18/20 1000   General Output (mL) 25 09/18/20 1000            Incision/Site 07/17/20 1251 Left Foot (Active)   07/17/20 1251   Present Prior to Hospital Arrival?:    Side: Left   Location: Foot   Orientation:    Incision Type:    Closure Method: Other (see comments)   Additional Comments:  open wound .betadine soak in 4x4's, abd, ace, castpadding, surgical boot   Removal Indication and Assessment:    Wound Outcome:    Removal Indications:    Wound Image   09/18/20 1000   Incision WDL ex 09/18/20 1000   Dressing Appearance Intact;Moist drainage 09/18/20 1000   Drainage Amount Moderate 09/18/20 1000   Drainage Characteristics/Odor Serosanguineous 09/18/20 1000   Appearance Pink;Red;White 09/18/20 1000   Black (%), Wound Tissue Color 0 % 09/18/20  1000   Red (%), Wound Tissue Color 100 % 09/18/20 1000   Yellow (%), Wound Tissue Color 0 % 09/18/20 1000   Periwound Area Macerated 09/18/20 1000   Wound Edges Defined 09/18/20 1000   Wound Length (cm) 3 cm 09/18/20 1000   Wound Width (cm) 3.6 cm 09/18/20 1000   Wound Depth (cm) 0.3 cm 09/18/20 1000   Wound Volume (cm^3) 3.24 cm^3 09/18/20 1000   Wound Surface Area (cm^2) 10.8 cm^2 09/18/20 1000   Tunneling (depth (cm)/location) 0 09/18/20 1000   Undermining (depth (cm)/location) 0 09/18/20 1000   Care Soap and water;Sterile normal saline;Wound cleanser 09/18/20 1000   Dressing Applied;Other (see comments) 09/18/20 1000   Compression Tubular elasticized bandage 09/18/20 1000   Off Loading Football dressing 09/18/20 1000   Dressing Change Due 09/22/20 09/18/20 1000           Plan:            Wound is improving and measuring smaller. Debridement performed. Macerated periwound. Neox (graft) with sutures applied by NERI.  orders on hold due to graft. Pt will come for a nurse visit Tuesday. Discussed elevating as much as possible.      NEOX OP REPORT    SURGEON: Aviva Martinez DPM  ASSITANT: None  PRE-OP DX: Ulceration secondary to diabetes mellitus and peripheral neuropathy with sluggish response to prior treatment.  POST-OP DX: same.  ANESTHESIA: Pt. has loss of sensation and therefore no anesthesia was required.  HEMOSTASIS: pressure  EBL: less than 5cc.    Time Out performed to verify patient and site and consent obtained.    Procedure: The patient was seen in the wound clinic room and placed in supine position on the treatment table.  Attention was directed to the ulcer which was located on the left plantar lateral foot, where an ulceration measuring  3.0 x 3.6 x 0.3 cm is noted.  The ulceration was covered with  fibrin and a mild callused rim with periwound maceration.  No acute signs of infection were noted.  The wound is nontender.  Utilizing a 3 mm curette, I debrided 100% of the wound full-thickness through  subcutaneous tissue to excise viable and nonviable tissue outside the wound margins.  Patient tolerated well.  The wound was flushed with sterile saline.  There was minimal bleeding with debridement which was well controlled with direct pressure.  A 4 x 3 cm NEOX sheet was then inspected and noted to be in acceptable.  It was then removed sterilely .  The sheet was then fenestrated with the scalpel and then cut and sized and shaped to the wound.  I utilized the entirety with overlapping edges against the wound.  The NEOX was adhered down with saline soaked cotton swabs and then secured in place 3-0 prolene and covered with adaptic touch non-adherent layer followed by placement of VAC.  The patient having tolerated the procedure well left the clinic with all vital signs stable and vascular status intact to all digits of both feet.     Short-term goals including promoting granulation and epithelialization of the wound. Long term goals include maintaining a healed wound with appropriate offloading and good medical management per internist.    Offloading: CAM boot        Orders Placed This Encounter   Procedures    Change dressing     Benzoin, neox applied with sutures, adaptic touch with steri strips, wound vac (black:1), jaleel foam long X3 perpendicular, football dressing (cast padding X3), single layer medium gradient tubigrip size E.    HOME HEALTH ORDERS     Subsequent Home Health Orders    Current Medications:  Current Outpatient Medications:  amLODIPine (NORVASC) 10 MG tablet, Take 1 tablet (10 mg total) by mouth once daily., Disp: 90 tablet, Rfl: 3  atorvastatin (LIPITOR) 40 MG tablet, Take 1 tablet (40 mg total) by mouth once daily., Disp: 90 tablet, Rfl: 0  BASAGLAR KWIKPEN U-100 INSULIN glargine 100 units/mL (3mL) SubQ pen, ADM 20 UNI SC BID, Disp: , Rfl:   blood sugar diagnostic Strp, 1 strip by Misc.(Non-Drug; Combo Route) route 3 (three) times daily. Patient needs One Touch Test Strips (Berio)., Disp:  "300 strip, Rfl: 2  blood-glucose meter kit, To check BG 3 times daily, to use with insurance preferred meter; Accu check., Disp: 1 each, Rfl: 0  carvediloL (COREG) 25 MG tablet, Take 1 tablet (25 mg total) by mouth 2 (two) times daily., Disp: 60 tablet, Rfl: 11  dulaglutide (TRULICITY) 0.75 mg/0.5 mL pen injector, INJECT 0.5 ML UNDER THE SKIN EVERY 7 DAYS, Disp: 2 mL, Rfl: 5  furosemide (LASIX) 40 MG tablet, Take 1 tablet (40 mg total) by mouth once daily., Disp: 30 tablet, Rfl: 0  gabapentin (NEURONTIN) 300 MG capsule, Take 1 capsule (300 mg total) by mouth 2 (two) times daily., Disp: 60 capsule, Rfl: 11  hydrALAZINE (APRESOLINE) 100 MG tablet, Take 1 tablet (100 mg total) by mouth every 8 (eight) hours., Disp: 90 tablet, Rfl: 11  insulin aspart U-100 (NOVOLOG) 100 unit/mL (3 mL) InPn pen, Inject 5 Units into the skin 3 (three) times daily with meals., Disp: 13.5 mL, Rfl: 3  insulin detemir U-100 (LEVEMIR FLEXTOUCH) 100 unit/mL (3 mL) SubQ InPn pen, Inject 10 Units into the skin every evening., Disp: 9 mL, Rfl: 3  pen needle, diabetic 32 gauge x 3/16" Ndle, 1 each by Misc.(Non-Drug; Combo Route) route 5 (five) times daily., Disp: 100 each, Rfl: 10  piperacillin sodium/tazobactam (PIPERACILLIN-TAZOBACTAM 4.5G/100ML SODIUM CHLORIDE 0.9%-READY TO MIX), Inject 100 mLs (4.5 g total) into the vein every 12 (twelve) hours., Disp:  , Rfl:   pulse oximeter (PULSE OXIMETER) device, by Apply Externally route 2 (two) times a day. Use twice daily at 8 AM and 3 PM and record the value in [employer] as directed., Disp: 1 each, Rfl: 0    No current facility-administered medications for this encounter.       Nursing:   Home health orders on hold due to neox (graft) in place.     Order Specific Question:   What Home Health Agency is the patient currently using?     Answer:   [facility]        No follow-ups on file.       "

## 2020-09-19 LAB
ACID FAST MOD KINY STN SPEC: NORMAL
MYCOBACTERIUM SPEC QL CULT: NORMAL

## 2020-09-21 ENCOUNTER — DOCUMENT SCAN (OUTPATIENT)
Dept: HOME HEALTH SERVICES | Facility: HOSPITAL | Age: 55
End: 2020-09-21
Payer: COMMERCIAL

## 2020-09-22 ENCOUNTER — HOSPITAL ENCOUNTER (OUTPATIENT)
Dept: WOUND CARE | Facility: HOSPITAL | Age: 55
Discharge: HOME | End: 2020-09-22
Attending: FAMILY MEDICINE
Payer: COMMERCIAL

## 2020-09-22 VITALS — HEART RATE: 86 BPM | SYSTOLIC BLOOD PRESSURE: 136 MMHG | DIASTOLIC BLOOD PRESSURE: 72 MMHG | TEMPERATURE: 98 F

## 2020-09-22 DIAGNOSIS — E11.621 DIABETIC ULCER OF LEFT FOOT ASSOCIATED WITH TYPE 2 DIABETES MELLITUS, WITH BONE INVOLVEMENT WITHOUT EVIDENCE OF NECROSIS: Primary | ICD-10-CM

## 2020-09-22 DIAGNOSIS — L97.526 DIABETIC ULCER OF LEFT FOOT ASSOCIATED WITH TYPE 2 DIABETES MELLITUS, WITH BONE INVOLVEMENT WITHOUT EVIDENCE OF NECROSIS: Primary | ICD-10-CM

## 2020-09-22 PROCEDURE — 97605 NEG PRS WND THER DME<=50SQCM: CPT

## 2020-09-22 NOTE — PROGRESS NOTES
[facility]  [place]  [id number]  Nurse Visit    Subjective:       Patient seen in clinic today.  Dressing changed as ordered. Patient instructed to call [employer] to order more vac supplies. Note drainage to marycarmen foam over dressing, area moist overall, but no breakdown noted. Reapplied vac dressing with new adaptic contact layer as previous one easily came up with removal of vac dressing. Did not rinse wound or manipulate sutures that remain in place per DPM Michelle. Wound bed red/moist.        Assessment:          Negative Pressure Wound Therapy  08/21/20 1007 Left lateral (Active)   08/21/20 1007   Side: Left   Orientation: lateral   Location: Foot   Additional Comments:    Location:    SDO Location:    NPWT Type Vacuum Therapy 09/22/20 1200   Therapy Setting NPWT Continuous therapy 09/22/20 1200   Pressure Setting NPWT 125 mmHg 09/22/20 1200   Sponges Inserted NPWT 2 09/22/20 1200   Sponges Removed NPWT 1 09/22/20 1200   General Output (mL) 30 09/22/20 1200            Incision/Site 07/17/20 1251 Left Foot (Active)   07/17/20 1251   Present Prior to Hospital Arrival?:    Side: Left   Location: Foot   Orientation:    Incision Type:    Closure Method: Other (see comments)   Additional Comments:  open wound .betadine soak in 4x4's, abd, ace, castpadding, surgical boot   Removal Indication and Assessment:    Wound Outcome:    Removal Indications:    Incision WDL ex 09/22/20 1200   Dressing Appearance Moist drainage 09/22/20 1200   Drainage Amount Moderate 09/22/20 1200   Drainage Characteristics/Odor Serosanguineous 09/22/20 1200   Appearance Red;Sutures intact 09/22/20 1200   Black (%), Wound Tissue Color 0 % 09/22/20 1200   Red (%), Wound Tissue Color 100 % 09/22/20 1200   Yellow (%), Wound Tissue Color 0 % 09/22/20 1200   Periwound Area Intact 09/22/20 1200   Wound Edges Open 09/22/20 1200   Care Cleansed with:;Soap and water 09/22/20 1200   Dressing Changed;Foam;Transparent  film;Cast padding 09/22/20 1200   Compression Tubular elasticized bandage 09/22/20 1200   Off Loading Football dressing 09/22/20 1200           Plan:     No orders of the defined types were placed in this encounter.          Follow up in about 3 days (around 9/25/2020) for wound care.

## 2020-09-24 PROCEDURE — G0179 MD RECERTIFICATION HHA PT: HCPCS | Mod: ,,, | Performed by: PODIATRIST

## 2020-09-24 PROCEDURE — G0179 PR HOME HEALTH MD RECERTIFICATION: ICD-10-PCS | Mod: ,,, | Performed by: PODIATRIST

## 2020-09-25 ENCOUNTER — HOSPITAL ENCOUNTER (OUTPATIENT)
Dept: WOUND CARE | Facility: HOSPITAL | Age: 55
Discharge: HOME | End: 2020-09-25
Attending: PODIATRIST
Payer: COMMERCIAL

## 2020-09-25 VITALS
RESPIRATION RATE: 17 BRPM | HEART RATE: 88 BPM | SYSTOLIC BLOOD PRESSURE: 123 MMHG | DIASTOLIC BLOOD PRESSURE: 69 MMHG | TEMPERATURE: 98 F

## 2020-09-25 DIAGNOSIS — E11.621 DIABETIC ULCER OF LEFT FOOT ASSOCIATED WITH TYPE 2 DIABETES MELLITUS, WITH BONE INVOLVEMENT WITHOUT EVIDENCE OF NECROSIS: Primary | ICD-10-CM

## 2020-09-25 DIAGNOSIS — L97.526 DIABETIC ULCER OF LEFT FOOT ASSOCIATED WITH TYPE 2 DIABETES MELLITUS, WITH BONE INVOLVEMENT WITHOUT EVIDENCE OF NECROSIS: Primary | ICD-10-CM

## 2020-09-25 DIAGNOSIS — E11.49 TYPE II DIABETES MELLITUS WITH NEUROLOGICAL MANIFESTATIONS: ICD-10-CM

## 2020-09-25 PROCEDURE — 97605 NEG PRS WND THER DME<=50SQCM: CPT | Performed by: PODIATRIST

## 2020-09-25 PROCEDURE — 99213 PR OFFICE/OUTPT VISIT, EST, LEVL III, 20-29 MIN: ICD-10-PCS | Mod: ,,, | Performed by: PODIATRIST

## 2020-09-25 PROCEDURE — 99213 OFFICE O/P EST LOW 20 MIN: CPT | Mod: ,,, | Performed by: PODIATRIST

## 2020-09-25 NOTE — PROGRESS NOTES
[facility]                Progress Note    Subjective:       Patient ID: Catalino Mcfadden is a 55 y.o. male.    Chief Complaint: Non-healing Wound Follow Up and Diabetic Foot Ulcer    09/25/2020: F/U visit. Wound is improving and measuring smaller. Macerated/scar tissue periwound. Neox (graft) still in place with sutures. No debridement performed.  orders on hold for one week due to graft in place. Discussed elevating as much as possible.      Review of Systems   Constitutional: Negative for appetite change, chills and fever.   Respiratory: Negative for cough, shortness of breath and wheezing.    Cardiovascular: Positive for leg swelling. Negative for chest pain.   Gastrointestinal: Negative for diarrhea, nausea and vomiting.   Musculoskeletal: Positive for arthralgias, joint swelling and myalgias.   Skin: Positive for wound.   Neurological: Positive for numbness and headaches. Negative for weakness.        + paresthesia          Objective:        Physical Exam  Vitals signs and nursing note reviewed.   Constitutional:       General: He is not in acute distress.     Appearance: He is not toxic-appearing or diaphoretic.      Comments: Pt. is well-developed, well-nourished, appears stated age, in no acute distress, alert and oriented x 3. No evidence of depression, anxiety, or agitation. Calm, cooperative, and communicative. Appropriate interactions and affect.   Cardiovascular:      Pulses:           Dorsalis pedis pulses are 2+ on the right side and 2+ on the left side.        Posterior tibial pulses are 1+ on the right side and 1+ on the left side.      Comments: There is decreased digital hair. Skin is atrophic, slightly hyperpigmented  Pulmonary:      Effort: No respiratory distress.   Musculoskeletal:      Right ankle: He exhibits normal range of motion and no swelling. No tenderness. No lateral malleolus, no medial malleolus, no AITFL, no CF ligament and no posterior  TFL tenderness found. Achilles tendon exhibits no pain, no defect and normal Hilliard's test results.      Left ankle: He exhibits normal range of motion and no swelling. No tenderness. No lateral malleolus, no medial malleolus, no AITFL, no CF ligament and no posterior TFL tenderness found. Achilles tendon exhibits no pain, no defect and normal Hilliard's test results.      Right foot: No tenderness or bony tenderness.      Left foot: No tenderness or bony tenderness.      Comments: Decreased stride, station of gait.  apropulsive toe off.  Increased angle and base of gait.    There is equinus deformity bilateral with decreased dorsiflexion at the ankle joint bilateral. No tenderness with compression of heel. Negative tinels sign. Gait analysis reveals excessive pronation through midstance and propulsion with early heel off.     Patient has hammertoes of digits 2-5 bilateral partially reducible     Decreased first MPJ range of motion both weightbearing and nonweightbearing, no crepitus observed the first MP joint, + dorsal flag sign.     Visible and palpable bunion without pain at dorsomedial 1st metatarsal head right and left.  Hallux abducted right and left partially reducible, tracks laterally without being track bound.  No ecchymosis, erythema, edema, or cardinal signs infection or signs of trauma same foot.    Fat pad atrophy to heels and met heads bilateral    Plantarflexed 1st ray RLE   Lymphadenopathy:      Comments: No lymphatic streaking    Negative lymphadenopathy bilateral popliteal fossa and tarsal tunnel.     Skin:     General: Skin is warm and dry.      Coloration: Skin is not pale.      Findings: Erythema and lesion (see wound description below) present. No abrasion, ecchymosis, laceration or rash.      Nails: There is no clubbing.        Comments: Toenails 1-5 bilaterally discolored/yellowed, dystrophic, brittle with subungual debris.    Neurological:      Sensory: Sensory deficit present.       Comments:  Hopkins-Dayton 5.07 monofilamant testing is diminished Kp feet. Decreased/absent vibratory sensation bilateral feet to 128Hz tuning fork.     Paresthesias, and hyperesthesia bilateral feet with no clearly identified trigger or source.           Psychiatric:         Attention and Perception: He is attentive.         Mood and Affect: Mood is not anxious. Affect is not inappropriate.         Speech: He is communicative. Speech is not slurred.         Behavior: Behavior is not combative.         Vitals:    09/25/20 1013   BP: 123/69   Pulse: 88   Resp: 17   Temp: 97.9 °F (36.6 °C)       Assessment:           ICD-10-CM ICD-9-CM   1. 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  E11.621 250.80    L97.526 707.15            Negative Pressure Wound Therapy  08/21/20 1007 Left lateral (Active)   08/21/20 1007   Side: Left   Orientation: lateral   Location: Foot   Additional Comments:    Location:    SDO Location:    NPWT Type Vacuum Therapy 09/25/20 1000   Therapy Setting NPWT Continuous therapy 09/25/20 1000   Pressure Setting NPWT 125 mmHg 09/25/20 1000   Therapy Interventions NPWT Canister changed;Dressing changed;Seal intact 09/25/20 1000   Sponges Inserted NPWT Black;1 09/25/20 1000   Sponges Removed NPWT Black;1 09/25/20 1000   General Output (mL) 25 09/25/20 1000            Incision/Site 07/17/20 1251 Left Foot (Active)   07/17/20 1251   Present Prior to Hospital Arrival?:    Side: Left   Location: Foot   Orientation:    Incision Type:    Closure Method: Other (see comments)   Additional Comments:  open wound .betadine soak in 4x4's, abd, ace, castpadding, surgical boot   Removal Indication and Assessment:    Wound Outcome:    Removal Indications:    Wound Image   09/25/20 1000   Incision WDL WDL 09/25/20 1000   Dressing Appearance Intact 09/25/20 1000   Drainage Amount Moderate 09/25/20 1000   Drainage Characteristics/Odor  Serosanguineous 09/25/20 1000   Appearance Red;Pink;White;Sutures intact 09/25/20 1000   Black (%), Wound Tissue Color 0 % 09/25/20 1000   Red (%), Wound Tissue Color 100 % 09/25/20 1000   Yellow (%), Wound Tissue Color 0 % 09/25/20 1000   Periwound Area Macerated;Scar tissue 09/25/20 1000   Wound Edges Open 09/25/20 1000   Wound Length (cm) 2.8 cm 09/25/20 1000   Wound Width (cm) 3.4 cm 09/25/20 1000   Wound Depth (cm) 0.1 cm 09/25/20 1000   Wound Volume (cm^3) 0.95 cm^3 09/25/20 1000   Wound Surface Area (cm^2) 9.52 cm^2 09/25/20 1000   Tunneling (depth (cm)/location) 0 09/25/20 1000   Undermining (depth (cm)/location) 0 09/25/20 1000   Care Soap and water;Sterile normal saline;Wound cleanser 09/25/20 1000   Dressing Applied;Other (see comments);Foam;Island/border 09/25/20 1000   Compression Tubular elasticized bandage 09/25/20 1000   Off Loading Football dressing 09/25/20 1000   Dressing Change Due 09/29/20 09/25/20 1000           Plan:            Greater than 50% of this visit spent on counseling and coordination of care.    Education about the prevention of limb loss.    Discussed wound healing cycle, skin integrity, ways to care for skin.Counseled patient on the effects of blood glucose on healing. He verbalizes understanding that it can increase the chances of delayed healing and this prolonged exposure leads to infection or progression of infection which subsequently can result in loss of limb.    Base is granular with neox in place.      VAC applied in clinic today          Orders Placed This Encounter   Procedures    Change dressing     Benzoin/cavilon, aquacel foam border to anterior ankle, [place] (black:1), jaleel foam long X3 perpendicular, football dressing (cast padding X3), single layer medium gradient tubigrip size E.    HOME HEALTH ORDERS     Subsequent Home Health Orders    Current Medications:  Current Outpatient Medications:  amLODIPine (NORVASC) 10 MG tablet, Take 1 tablet (10 mg total) by mouth  "once daily., Disp: 90 tablet, Rfl: 3  atorvastatin (LIPITOR) 40 MG tablet, Take 1 tablet (40 mg total) by mouth once daily., Disp: 90 tablet, Rfl: 0  BASAGLAR KWIKPEN U-100 INSULIN glargine 100 units/mL (3mL) SubQ pen, ADM 20 UNI SC BID, Disp: , Rfl:   blood sugar diagnostic Strp, 1 strip by Misc.(Non-Drug; Combo Route) route 3 (three) times daily. Patient needs One Touch Test Strips (Berio)., Disp: 300 strip, Rfl: 2  blood-glucose meter kit, To check BG 3 times daily, to use with insurance preferred meter; Accu check., Disp: 1 each, Rfl: 0  carvediloL (COREG) 25 MG tablet, Take 1 tablet (25 mg total) by mouth 2 (two) times daily., Disp: 60 tablet, Rfl: 11  dulaglutide (TRULICITY) 0.75 mg/0.5 mL pen injector, INJECT 0.5 ML UNDER THE SKIN EVERY 7 DAYS, Disp: 2 mL, Rfl: 5  furosemide (LASIX) 40 MG tablet, Take 1 tablet (40 mg total) by mouth once daily., Disp: 30 tablet, Rfl: 0  gabapentin (NEURONTIN) 300 MG capsule, Take 1 capsule (300 mg total) by mouth 2 (two) times daily., Disp: 60 capsule, Rfl: 11  hydrALAZINE (APRESOLINE) 100 MG tablet, Take 1 tablet (100 mg total) by mouth every 8 (eight) hours., Disp: 90 tablet, Rfl: 11  insulin aspart U-100 (NOVOLOG) 100 unit/mL (3 mL) InPn pen, Inject 5 Units into the skin 3 (three) times daily with meals., Disp: 13.5 mL, Rfl: 3  insulin detemir U-100 (LEVEMIR FLEXTOUCH) 100 unit/mL (3 mL) SubQ InPn pen, Inject 10 Units into the skin every evening., Disp: 9 mL, Rfl: 3  pen needle, diabetic 32 gauge x 3/16" Ndle, 1 each by Misc.(Non-Drug; Combo Route) route 5 (five) times daily., Disp: 100 each, Rfl: 10  piperacillin sodium/tazobactam (PIPERACILLIN-TAZOBACTAM 4.5G/100ML SODIUM CHLORIDE 0.9%-READY TO MIX), Inject 100 mLs (4.5 g total) into the vein every 12 (twelve) hours., Disp:  , Rfl:   pulse oximeter (PULSE OXIMETER) device, by Apply Externally route 2 (two) times a day. Use twice daily at 8 AM and 3 PM and record the value in MyChart as directed., Disp: 1 each, Rfl: " 0    No current facility-administered medications for this encounter.       Nursing:   Home health orders on hold due to neox (graft) in place for one week.     Order Specific Question:   What Home Health Agency is the patient currently using?     Answer:   [facility]        No follow-ups on file.

## 2020-09-29 ENCOUNTER — HOSPITAL ENCOUNTER (OUTPATIENT)
Dept: WOUND CARE | Facility: HOSPITAL | Age: 55
Discharge: HOME | End: 2020-09-29
Attending: FAMILY MEDICINE
Payer: COMMERCIAL

## 2020-09-29 VITALS
HEART RATE: 86 BPM | DIASTOLIC BLOOD PRESSURE: 84 MMHG | SYSTOLIC BLOOD PRESSURE: 155 MMHG | RESPIRATION RATE: 17 BRPM | TEMPERATURE: 98 F

## 2020-09-29 DIAGNOSIS — L97.526 DIABETIC ULCER OF LEFT FOOT ASSOCIATED WITH TYPE 2 DIABETES MELLITUS, WITH BONE INVOLVEMENT WITHOUT EVIDENCE OF NECROSIS: Primary | ICD-10-CM

## 2020-09-29 DIAGNOSIS — E11.621 DIABETIC ULCER OF LEFT FOOT ASSOCIATED WITH TYPE 2 DIABETES MELLITUS, WITH BONE INVOLVEMENT WITHOUT EVIDENCE OF NECROSIS: Primary | ICD-10-CM

## 2020-09-29 PROCEDURE — 97605 NEG PRS WND THER DME<=50SQCM: CPT

## 2020-09-29 NOTE — PROGRESS NOTES
[facility]  CONSTANZA Newell  [id number]  Nurse Visit    Subjective:       Patient seen in clinic today.  Dressing changed as ordered.      Assessment:          Negative Pressure Wound Therapy  08/21/20 1007 Left lateral (Active)   08/21/20 1007   Side: Left   Orientation: lateral   Location: Foot   Additional Comments:    Location:    SDO Location:    NPWT Type Vacuum Therapy 09/29/20 1400   Therapy Setting NPWT Continuous therapy 09/29/20 1400   Pressure Setting NPWT 125 mmHg 09/29/20 1400   Therapy Interventions NPWT Canister changed;Dressing changed;Seal intact 09/29/20 1400   Sponges Inserted NPWT Black;1 09/29/20 1400   Sponges Removed NPWT Black;1 09/29/20 1400   General Output (mL) 25 09/29/20 1400            Incision/Site 07/17/20 1251 Left Foot (Active)   07/17/20 1251   Present Prior to Hospital Arrival?:    Side: Left   Location: Foot   Orientation:    Incision Type:    Closure Method: Other (see comments)   Additional Comments:  open wound .betadine soak in 4x4's, abd, ace, castpadding, surgical boot   Removal Indication and Assessment:    Wound Outcome:    Removal Indications:    Incision WDL ex 09/29/20 1400   Dressing Appearance Intact 09/29/20 1400   Drainage Amount Moderate 09/29/20 1400   Drainage Characteristics/Odor Serosanguineous 09/29/20 1400   Appearance Red;Pink;White 09/29/20 1400   Black (%), Wound Tissue Color 0 % 09/29/20 1400   Red (%), Wound Tissue Color 100 % 09/29/20 1400   Yellow (%), Wound Tissue Color 0 % 09/29/20 1400   Periwound Area Macerated 09/29/20 1400   Wound Edges Defined 09/29/20 1400   Tunneling (depth (cm)/location) 0 09/29/20 1400   Undermining (depth (cm)/location) 0 09/29/20 1400   Care Soap and water;Wound cleanser;Sterile normal saline 09/29/20 1400   Dressing Applied;Other (see comments);Foam 09/29/20 1400   Compression Tubular elasticized bandage 09/29/20 1400   Off Loading Football dressing 09/29/20 1400   Dressing Change  Due 10/02/20 09/29/20 1400           Plan:     No orders of the defined types were placed in this encounter.          No follow-ups on file.

## 2020-10-02 ENCOUNTER — HOSPITAL ENCOUNTER (OUTPATIENT)
Dept: WOUND CARE | Facility: HOSPITAL | Age: 55
Discharge: HOME | End: 2020-10-02
Attending: PODIATRIST
Payer: COMMERCIAL

## 2020-10-02 ENCOUNTER — TELEPHONE (OUTPATIENT)
Dept: WOUND CARE | Facility: HOSPITAL | Age: 55
End: 2020-10-02

## 2020-10-02 ENCOUNTER — PATIENT MESSAGE (OUTPATIENT)
Dept: PODIATRY | Facility: CLINIC | Age: 55
End: 2020-10-02

## 2020-10-02 VITALS — TEMPERATURE: 98 F | SYSTOLIC BLOOD PRESSURE: 140 MMHG | DIASTOLIC BLOOD PRESSURE: 79 MMHG | HEART RATE: 84 BPM

## 2020-10-02 DIAGNOSIS — L97.526 DIABETIC ULCER OF LEFT FOOT ASSOCIATED WITH TYPE 2 DIABETES MELLITUS, WITH BONE INVOLVEMENT WITHOUT EVIDENCE OF NECROSIS: Primary | ICD-10-CM

## 2020-10-02 DIAGNOSIS — E11.621 DIABETIC ULCER OF LEFT FOOT ASSOCIATED WITH TYPE 2 DIABETES MELLITUS, WITH BONE INVOLVEMENT WITHOUT EVIDENCE OF NECROSIS: Primary | ICD-10-CM

## 2020-10-02 DIAGNOSIS — E11.49 TYPE II DIABETES MELLITUS WITH NEUROLOGICAL MANIFESTATIONS: ICD-10-CM

## 2020-10-02 PROCEDURE — 15275 SKIN SUB GRAFT FACE/NK/HF/G: CPT | Performed by: PODIATRIST

## 2020-10-02 PROCEDURE — 15275 PR SKIN SUB GRAFT FACE/NK/HF/G UP TO 100 SQCM: ICD-10-PCS | Mod: ,,, | Performed by: PODIATRIST

## 2020-10-02 PROCEDURE — 15275 SKIN SUB GRAFT FACE/NK/HF/G: CPT | Mod: ,,, | Performed by: PODIATRIST

## 2020-10-02 NOTE — PROGRESS NOTES
[facility]                Progress Note    Subjective:       Patient ID: Catalino Mcfadden is a 55 y.o. male.    Chief Complaint: No chief complaint on file.    Patient ambulated to exam bed without assistance. No complaints of pain. No new issues. Left Plantar/Lateral Foot wound bed is red and granular with sutures in place from previous graft placement. Drainage controlled with no periwound breakdown. Wound measuring smaller today. @20 cc of serous drainage in discarded wound vac cannister. Previous sutures removed and new Neox sutured in place with plans for nurse visit next week for outer dressing change only. Patient continuing to offload at home. Disability paperwork faxed earlier this week.       Review of Systems   Constitutional: Negative for appetite change, chills and fever.   Respiratory: Negative for cough, shortness of breath and wheezing.    Cardiovascular: Positive for leg swelling. Negative for chest pain.   Gastrointestinal: Negative for diarrhea, nausea and vomiting.   Musculoskeletal: Positive for arthralgias, joint swelling and myalgias.   Skin: Positive for wound.   Neurological: Positive for numbness and headaches. Negative for weakness.        + paresthesia          Objective:        Physical Exam  Vitals signs and nursing note reviewed.   Constitutional:       General: He is not in acute distress.     Appearance: He is not toxic-appearing or diaphoretic.      Comments: Pt. is well-developed, well-nourished, appears stated age, in no acute distress, alert and oriented x 3. No evidence of depression, anxiety, or agitation. Calm, cooperative, and communicative. Appropriate interactions and affect.   Cardiovascular:      Pulses:           Dorsalis pedis pulses are 2+ on the right side and 2+ on the left side.        Posterior tibial pulses are 1+ on the right side and 1+ on the left side.      Comments: There is decreased digital hair. Skin is atrophic,  slightly hyperpigmented  Pulmonary:      Effort: No respiratory distress.   Musculoskeletal:      Right ankle: He exhibits normal range of motion and no swelling. No tenderness. No lateral malleolus, no medial malleolus, no AITFL, no CF ligament and no posterior TFL tenderness found. Achilles tendon exhibits no pain, no defect and normal Hilliard's test results.      Left ankle: He exhibits normal range of motion and no swelling. No tenderness. No lateral malleolus, no medial malleolus, no AITFL, no CF ligament and no posterior TFL tenderness found. Achilles tendon exhibits no pain, no defect and normal Hilliard's test results.      Right foot: No tenderness or bony tenderness.      Left foot: No tenderness or bony tenderness.      Comments: Decreased stride, station of gait.  apropulsive toe off.  Increased angle and base of gait.    There is equinus deformity bilateral with decreased dorsiflexion at the ankle joint bilateral. No tenderness with compression of heel. Negative tinels sign. Gait analysis reveals excessive pronation through midstance and propulsion with early heel off.     Patient has hammertoes of digits 2-5 bilateral partially reducible     Decreased first MPJ range of motion both weightbearing and nonweightbearing, no crepitus observed the first MP joint, + dorsal flag sign.     Visible and palpable bunion without pain at dorsomedial 1st metatarsal head right and left.  Hallux abducted right and left partially reducible, tracks laterally without being track bound.  No ecchymosis, erythema, edema, or cardinal signs infection or signs of trauma same foot.    Fat pad atrophy to heels and met heads bilateral    Plantarflexed 1st ray RLE   Lymphadenopathy:      Comments: No lymphatic streaking    Negative lymphadenopathy bilateral popliteal fossa and tarsal tunnel.     Skin:     General: Skin is warm and dry.      Coloration: Skin is not pale.      Findings: Erythema and lesion (see wound description  below) present. No abrasion, ecchymosis, laceration or rash.      Nails: There is no clubbing.        Comments: Toenails 1-5 bilaterally discolored/yellowed, dystrophic, brittle with subungual debris.    Neurological:      Sensory: Sensory deficit present.      Comments:  [place] 5.07 monofilamant testing is diminished Kp feet. Decreased/absent vibratory sensation bilateral feet to 128Hz tuning fork.     Paresthesias, and hyperesthesia bilateral feet with no clearly identified trigger or source.           Psychiatric:         Attention and Perception: He is attentive.         Mood and Affect: Mood is not anxious. Affect is not inappropriate.         Speech: He is communicative. Speech is not slurred.         Behavior: Behavior is not combative.         Vitals:    10/02/20 0920   BP: (!) 140/79   Pulse: 84   Temp: 97.5 °F (36.4 °C)       Assessment:           ICD-10-CM ICD-9-CM   1. 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  E11.621 250.80    L97.526 707.15            Negative Pressure Wound Therapy  08/21/20 1007 Left lateral (Active)   08/21/20 1007   Side: Left   Orientation: lateral   Location: Foot   Additional Comments:    Location:    SDO Location:    NPWT Type Vacuum Therapy 10/02/20 0900   Therapy Setting NPWT Continuous therapy 10/02/20 0900   Pressure Setting NPWT 125 mmHg 10/02/20 0900   Sponges Inserted NPWT Black;1 10/02/20 0900   Sponges Removed NPWT Black;1 10/02/20 0900   General Output (mL) 20 10/02/20 0900            Incision/Site 07/17/20 1251 Left Foot (Active)   07/17/20 1251   Present Prior to Hospital Arrival?:    Side: Left   Location: Foot   Orientation:    Incision Type:    Closure Method: Other (see comments)   Additional Comments:  open wound .betadine soak in 4x4's, abd, ace, castpadding, surgical boot   Removal Indication and Assessment:    Wound Outcome:    Removal Indications:    Wound Image    10/02/20 0900   Incision WDL ex 10/02/20 0900   Dressing Appearance Moist drainage 10/02/20 0900   Drainage Amount Small 10/02/20 0900   Drainage Characteristics/Odor Serous 10/02/20 0900   Appearance Red;Granulating 10/02/20 0900   Black (%), Wound Tissue Color 0 % 10/02/20 0900   Red (%), Wound Tissue Color 100 % 10/02/20 0900   Yellow (%), Wound Tissue Color 0 % 10/02/20 0900   Periwound Area Intact 10/02/20 0900   Wound Edges Open 10/02/20 0900   Wound Length (cm) 3 cm 10/02/20 0900   Wound Width (cm) 3 cm 10/02/20 0900   Wound Depth (cm) 0.15 cm 10/02/20 0900   Wound Volume (cm^3) 1.35 cm^3 10/02/20 0900   Wound Surface Area (cm^2) 9 cm^2 10/02/20 0900   Care Cleansed with:;Soap and water;Sterile normal saline 10/02/20 0900   Dressing Changed 10/02/20 0900   Compression Tubular elasticized bandage 10/02/20 0900   Off Loading Football dressing 10/02/20 0900           Plan:              Wound is improving and measuring smaller. Debridement performed. Macerated periwound. Neox (graft) with sutures applied.  orders on hold due to graft. Pt will come for a nurse visit Tuesday. Discussed elevating as much as possible.      NEOX OP REPORT    SURGEON: Aviva Martinez DPM  ASSITANT: None  PRE-OP DX: Ulceration secondary to diabetes mellitus and peripheral neuropathy with sluggish response to prior treatment.  POST-OP DX: same.  ANESTHESIA: Pt. has loss of sensation and therefore no anesthesia was required.  HEMOSTASIS: pressure  EBL: less than 5cc.    Time Out performed to verify patient and site and consent obtained.    Procedure: The patient was seen in the wound clinic room and placed in supine position on the treatment table.  Attention was directed to the ulcer which was located on the left plantar lateral foot, where an ulceration measuring  3.0 x 3.0 x 0.15 cm is noted.  The ulceration was had a  mild callused rim with periwound maceration.  No acute signs of infection were noted.  The wound is nontender.   Utilizing a 3 mm curette, I debrided 100% of the wound full-thickness through subcutaneous tissue to excise viable and nonviable tissue outside the wound margins.  Patient tolerated well.  The wound was flushed with sterile saline.  There was minimal bleeding with debridement which was well controlled with direct pressure.  A 4 x 3 cm NEOX sheet was then inspected and noted to be in acceptable.  It was then removed sterilely .  The sheet was then fenestrated with the scalpel and then cut and sized and shaped to the wound.  I utilized the entirety with overlapping edges against the wound.  The NEOX was adhered down with saline soaked cotton swabs and then secured in place 3-0 prolene and covered with adaptic touch non-adherent layer followed by placement of VAC.  The patient having tolerated the procedure well left the clinic with all vital signs stable and vascular status intact to all digits of both feet.     Short-term goals including promoting granulation and epithelialization of the wound. Long term goals include maintaining a healed wound with appropriate offloading and good medical management per internist.    Offloading: CAM boot        Orders Placed This Encounter   Procedures    Change dressing     Irrigate with saline. Neox graft/adaptic touch/steristrips placed per MD, NPWT black sponge with island dressing to dorsal foot/suction 125 mmHg low continuous, football: mextra, marycarmen foam x 2, cast padding x 3, tubigrip medium. Leave adaptic in place during nurse visit and change outer dressing only.    SUBSEQUENT HOME HEALTH ORDERS     Subsequent Home Health Orders    Current Medications:  Current Outpatient Medications:  amLODIPine (NORVASC) 10 MG tablet, Take 1 tablet (10 mg total) by mouth once daily., Disp: 90 tablet, Rfl: 3  atorvastatin (LIPITOR) 40 MG tablet, Take 1 tablet (40 mg total) by mouth once daily., Disp: 90 tablet, Rfl: 0  BASAGLAR KWIKPEN U-100 INSULIN glargine 100 units/mL (3mL) SubQ  "pen, ADM 20 UNI SC BID, Disp: , Rfl:   blood sugar diagnostic Strp, 1 strip by Misc.(Non-Drug; Combo Route) route 3 (three) times daily. Patient needs One Touch Test Strips (Berio)., Disp: 300 strip, Rfl: 2  blood-glucose meter kit, To check BG 3 times daily, to use with insurance preferred meter; Accu check., Disp: 1 each, Rfl: 0  carvediloL (COREG) 25 MG tablet, Take 1 tablet (25 mg total) by mouth 2 (two) times daily., Disp: 60 tablet, Rfl: 11  dulaglutide (TRULICITY) 0.75 mg/0.5 mL pen injector, INJECT 0.5 ML UNDER THE SKIN EVERY 7 DAYS, Disp: 2 mL, Rfl: 5  furosemide (LASIX) 40 MG tablet, Take 1 tablet (40 mg total) by mouth once daily., Disp: 30 tablet, Rfl: 0  gabapentin (NEURONTIN) 300 MG capsule, Take 1 capsule (300 mg total) by mouth 2 (two) times daily., Disp: 60 capsule, Rfl: 11  hydrALAZINE (APRESOLINE) 100 MG tablet, Take 1 tablet (100 mg total) by mouth every 8 (eight) hours., Disp: 90 tablet, Rfl: 11  insulin aspart U-100 (NOVOLOG) 100 unit/mL (3 mL) InPn pen, Inject 5 Units into the skin 3 (three) times daily with meals., Disp: 13.5 mL, Rfl: 3  insulin detemir U-100 (LEVEMIR FLEXTOUCH) 100 unit/mL (3 mL) SubQ InPn pen, Inject 10 Units into the skin every evening., Disp: 9 mL, Rfl: 3  pen needle, diabetic 32 gauge x 3/16" Ndle, 1 each by Misc.(Non-Drug; Combo Route) route 5 (five) times daily., Disp: 100 each, Rfl: 10  piperacillin sodium/tazobactam (PIPERACILLIN-TAZOBACTAM 4.5G/100ML SODIUM CHLORIDE 0.9%-READY TO MIX), Inject 100 mLs (4.5 g total) into the vein every 12 (twelve) hours., Disp:  , Rfl:   pulse oximeter (PULSE OXIMETER) device, by Apply Externally route 2 (two) times a day. Use twice daily at 8 AM and 3 PM and record the value in MyChart as directed., Disp: 1 each, Rfl: 0    No current facility-administered medications for this encounter.       Nursing:   Graft placed in clinic on 10/2/20. No need for Home Health visit this week.     Order Specific Question:   What Home Health Agency " is the patient currently using?     Answer:   [facility]        Follow up in about 4 days (around 10/6/2020) for outer dressing change.

## 2020-10-05 ENCOUNTER — DOCUMENT SCAN (OUTPATIENT)
Dept: HOME HEALTH SERVICES | Facility: HOSPITAL | Age: 55
End: 2020-10-05
Payer: COMMERCIAL

## 2020-10-05 ENCOUNTER — PATIENT MESSAGE (OUTPATIENT)
Dept: ADMINISTRATIVE | Facility: HOSPITAL | Age: 55
End: 2020-10-05

## 2020-10-05 ENCOUNTER — EXTERNAL HOME HEALTH (OUTPATIENT)
Dept: HOME HEALTH SERVICES | Facility: HOSPITAL | Age: 55
End: 2020-10-05
Payer: COMMERCIAL

## 2020-10-06 ENCOUNTER — HOSPITAL ENCOUNTER (OUTPATIENT)
Dept: WOUND CARE | Facility: HOSPITAL | Age: 55
Discharge: HOME | End: 2020-10-06
Attending: FAMILY MEDICINE
Payer: COMMERCIAL

## 2020-10-06 VITALS
RESPIRATION RATE: 17 BRPM | DIASTOLIC BLOOD PRESSURE: 84 MMHG | HEART RATE: 88 BPM | SYSTOLIC BLOOD PRESSURE: 161 MMHG | TEMPERATURE: 98 F

## 2020-10-06 PROCEDURE — 97605 NEG PRS WND THER DME<=50SQCM: CPT

## 2020-10-06 NOTE — PROGRESS NOTES
[facility]  CONSTAN[place] Newell  [id number]  Nurse Visit    Subjective:       Patient seen in clinic today.  Dressing changed as ordered.      Assessment:          Negative Pressure Wound Therapy  08/21/20 1007 Left lateral (Active)   08/21/20 1007   Side: Left   Orientation: lateral   Location: Foot   Additional Comments:    Location:    SDO Location:    NPWT Type Vacuum Therapy 10/06/20 1300   Therapy Setting NPWT Continuous therapy 10/06/20 1300   Pressure Setting NPWT 125 mmHg 10/06/20 1300   Therapy Interventions NPWT Canister changed;Dressing changed;Seal intact 10/06/20 1300   Sponges Inserted NPWT Black;1 10/06/20 1300   Sponges Removed NPWT Black;1 10/06/20 1300   General Output (mL) 15 10/06/20 1300            Incision/Site 07/17/20 1251 Left Foot (Active)   07/17/20 1251   Present Prior to Hospital Arrival?:    Side: Left   Location: Foot   Orientation:    Incision Type:    Closure Method: Other (see comments)   Additional Comments:  open wound .betadine soak in 4x4's, abd, ace, castpadding, surgical boot   Removal Indication and Assessment:    Wound Outcome:    Removal Indications:    Incision WDL ex 10/06/20 1300   Dressing Appearance Intact;Moist drainage 10/06/20 1300   Drainage Amount Moderate 10/06/20 1300   Drainage Characteristics/Odor Serosanguineous 10/06/20 1300   Appearance Red;White;Sutures intact 10/06/20 1300   Black (%), Wound Tissue Color 0 % 10/06/20 1300   Red (%), Wound Tissue Color 100 % 10/06/20 1300   Yellow (%), Wound Tissue Color 0 % 10/06/20 1300   Periwound Area Macerated 10/06/20 1300   Wound Edges Defined 10/06/20 1300   Tunneling (depth (cm)/location) 0 10/06/20 1300   Undermining (depth (cm)/location) 0 10/06/20 1300   Care Soap and water;Sterile normal saline;Wound cleanser 10/06/20 1300   Dressing Applied;Other (see comments);Foam 10/06/20 1300   Compression Tubular elasticized bandage 10/06/20 1300   Off Loading Football dressing 10/06/20  1300   Dressing Change Due 10/08/20 10/06/20 1300           Plan:     No orders of the defined types were placed in this encounter.          No follow-ups on file.

## 2020-10-08 ENCOUNTER — HOSPITAL ENCOUNTER (OUTPATIENT)
Dept: WOUND CARE | Facility: HOSPITAL | Age: 55
Discharge: HOME | End: 2020-10-08
Attending: INTERNAL MEDICINE
Payer: COMMERCIAL

## 2020-10-08 VITALS
RESPIRATION RATE: 18 BRPM | HEART RATE: 88 BPM | TEMPERATURE: 98 F | SYSTOLIC BLOOD PRESSURE: 142 MMHG | DIASTOLIC BLOOD PRESSURE: 81 MMHG

## 2020-10-08 DIAGNOSIS — E11.621 DIABETIC ULCER OF LEFT FOOT ASSOCIATED WITH TYPE 2 DIABETES MELLITUS, WITH BONE INVOLVEMENT WITHOUT EVIDENCE OF NECROSIS: ICD-10-CM

## 2020-10-08 DIAGNOSIS — L97.526 DIABETIC ULCER OF LEFT FOOT ASSOCIATED WITH TYPE 2 DIABETES MELLITUS, WITH BONE INVOLVEMENT WITHOUT EVIDENCE OF NECROSIS: ICD-10-CM

## 2020-10-08 PROCEDURE — 99214 PR OFFICE/OUTPT VISIT, EST, LEVL IV, 30-39 MIN: ICD-10-PCS | Mod: ,,, | Performed by: INTERNAL MEDICINE

## 2020-10-08 PROCEDURE — 97605 NEG PRS WND THER DME<=50SQCM: CPT | Performed by: INTERNAL MEDICINE

## 2020-10-08 PROCEDURE — 99214 OFFICE O/P EST MOD 30 MIN: CPT | Mod: ,,, | Performed by: INTERNAL MEDICINE

## 2020-10-08 NOTE — PROGRESS NOTES
[facility]                Progress Note    Subjective:       Patient ID: Catalino Mcfadden is a 55 y.o. male.    Chief Complaint: Diabetic Foot Ulcer    Patient ambulated to exam bed without assistance. No pain. Left Lateral Foot with intact sutures and only remnant of Neox left in place. No issues with NPWT. Wound measures slightly SMALLER today. Sutures removed prior to reapplication of wound vac. Will reorder Neox for reapplication per DPM Michelle next week.     No problems with wound vac has been able to keep consistent seal minimal drainage in canister. No pain no fevers/chills.       Review of Systems   Constitutional: Negative for activity change, appetite change, fatigue, fever and unexpected weight change.   HENT: Negative for ear pain, rhinorrhea and sore throat.    Eyes: Negative for discharge and visual disturbance.   Respiratory: Negative for chest tightness, shortness of breath and wheezing.    Cardiovascular: Negative for chest pain, palpitations and leg swelling.   Gastrointestinal: Negative for abdominal pain, constipation and diarrhea.   Endocrine: Negative for cold intolerance and heat intolerance.   Genitourinary: Negative for dysuria and hematuria.   Musculoskeletal: Negative for joint swelling and neck stiffness.   Skin: Negative for rash.   Neurological: Negative for dizziness, syncope, weakness and headaches.   Psychiatric/Behavioral: Negative for suicidal ideas.         Objective:        Physical Exam  Constitutional:       General: He is not in acute distress.     Appearance: Normal appearance. He is obese.   HENT:      Head: Normocephalic and atraumatic.   Eyes:      General: No scleral icterus.  Pulmonary:      Effort: Pulmonary effort is normal. No respiratory distress.   Abdominal:      General: There is no distension.      Palpations: Abdomen is soft.   Musculoskeletal:      Right lower leg: No edema.      Left lower leg: No edema.   Skin:      Comments: Pink granulation to entire wound bases approximately 25% of proximal wound bed with visible neox graft still present sutures no longer adjacent to neox removed without difficulty or bleeding   Neurological:      General: No focal deficit present.      Mental Status: He is alert and oriented to person, place, and time.   Psychiatric:         Mood and Affect: Mood normal.         Behavior: Behavior normal.         Thought Content: Thought content normal.         Vitals:    10/08/20 0900   BP: (!) 142/81   Pulse: 88   Resp: 18   Temp: 98.1 °F (36.7 °C)       Assessment:           ICD-10-CM ICD-9-CM   1. 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  E11.621 250.80    L97.526 707.15            Negative Pressure Wound Therapy  08/21/20 1007 Left lateral (Active)   08/21/20 1007   Side: Left   Orientation: lateral   Location: Foot   Additional Comments:    Location:    SDO Location:    Therapy Setting NPWT Continuous therapy 10/08/20 0900   Pressure Setting NPWT 125 mmHg 10/08/20 0900   Therapy Interventions NPWT Dressing changed;Seal intact;Trac pad replaced 10/08/20 0900   Sponges Inserted NPWT 1;Black 10/08/20 0900   Sponges Removed NPWT Black;1 10/08/20 0900   General Output (mL) 30 10/08/20 0900            Incision/Site 07/17/20 1251 Left Foot (Active)   07/17/20 1251   Present Prior to Hospital Arrival?:    Side: Left   Location: Foot   Orientation:    Incision Type:    Closure Method: Other (see comments)   Additional Comments:  open wound .betadine soak in 4x4's, abd, ace, castpadding, surgical boot   Removal Indication and Assessment:    Wound Outcome:    Removal Indications:    Incision WDL ex 10/08/20 0900   Dressing Appearance Moist drainage 10/08/20 0900   Drainage Amount Small 10/08/20 0900   Drainage Characteristics/Odor Serous 10/08/20 0900   Appearance Pink;Red;Sutures intact 10/08/20 0900   Black (%), Wound Tissue Color  0 % 10/08/20 0900   Red (%), Wound Tissue Color 100 % 10/08/20 0900   Yellow (%), Wound Tissue Color 0 % 10/08/20 0900   Periwound Area Intact 10/08/20 0900   Wound Edges Open 10/08/20 0900   Wound Length (cm) 2.7 cm 10/08/20 0900   Wound Width (cm) 3 cm 10/08/20 0900   Wound Depth (cm) 1.5 cm 10/08/20 0900   Wound Volume (cm^3) 12.15 cm^3 10/08/20 0900   Wound Surface Area (cm^2) 8.1 cm^2 10/08/20 0900   Care Cleansed with:;Soap and water;Sterile normal saline 10/08/20 0900   Dressing Changed 10/08/20 0900   Periwound Care Skin barrier film applied 10/08/20 0900   Off Loading Football dressing 10/08/20 0900           Plan:            Continue NPWT with nurse visit 10/13 for dressing change. To follow up next Friday 10/16 for repeat neox application     Orders Placed This Encounter   Procedures    Change dressing     Clean with saline. Gentian violet periwound. Cavilon under transparent drape. Apply black sponge. NPWT 125 mmHg low continuous. Protect with mextra, marycarmen foam, cast padding x 3, tubigrip medium. Change during nurse visit.        Follow up in about 5 days (around 10/13/2020) for NPWT change.

## 2020-10-09 ENCOUNTER — OFFICE VISIT (OUTPATIENT)
Dept: CARDIOLOGY | Facility: CLINIC | Age: 55
End: 2020-10-09
Payer: COMMERCIAL

## 2020-10-09 VITALS
OXYGEN SATURATION: 98 % | DIASTOLIC BLOOD PRESSURE: 86 MMHG | BODY MASS INDEX: 37.75 KG/M2 | WEIGHT: 284.81 LBS | HEART RATE: 97 BPM | HEIGHT: 73 IN | SYSTOLIC BLOOD PRESSURE: 146 MMHG

## 2020-10-09 DIAGNOSIS — Z79.4 TYPE 2 DIABETES MELLITUS WITH STAGE 4 CHRONIC KIDNEY DISEASE, WITH LONG-TERM CURRENT USE OF INSULIN: ICD-10-CM

## 2020-10-09 DIAGNOSIS — I10 ESSENTIAL HYPERTENSION: Primary | ICD-10-CM

## 2020-10-09 DIAGNOSIS — Z86.31 HISTORY OF DIABETIC ULCER OF FOOT: ICD-10-CM

## 2020-10-09 DIAGNOSIS — E11.22 TYPE 2 DIABETES MELLITUS WITH STAGE 4 CHRONIC KIDNEY DISEASE, WITH LONG-TERM CURRENT USE OF INSULIN: ICD-10-CM

## 2020-10-09 DIAGNOSIS — N18.4 TYPE 2 DIABETES MELLITUS WITH STAGE 4 CHRONIC KIDNEY DISEASE, WITH LONG-TERM CURRENT USE OF INSULIN: ICD-10-CM

## 2020-10-09 DIAGNOSIS — R79.89 ELEVATED TROPONIN: ICD-10-CM

## 2020-10-09 DIAGNOSIS — U07.1 COVID-19 VIRUS INFECTION: ICD-10-CM

## 2020-10-09 DIAGNOSIS — N52.9 ERECTILE DYSFUNCTION, UNSPECIFIED ERECTILE DYSFUNCTION TYPE: ICD-10-CM

## 2020-10-09 PROCEDURE — 3079F DIAST BP 80-89 MM HG: CPT | Mod: CPTII,S$GLB,, | Performed by: INTERNAL MEDICINE

## 2020-10-09 PROCEDURE — 99999 PR PBB SHADOW E&M-EST. PATIENT-LVL IV: ICD-10-PCS | Mod: PBBFAC,,, | Performed by: INTERNAL MEDICINE

## 2020-10-09 PROCEDURE — 3079F PR MOST RECENT DIASTOLIC BLOOD PRESSURE 80-89 MM HG: ICD-10-PCS | Mod: CPTII,S$GLB,, | Performed by: INTERNAL MEDICINE

## 2020-10-09 PROCEDURE — 99999 PR PBB SHADOW E&M-EST. PATIENT-LVL IV: CPT | Mod: PBBFAC,,, | Performed by: INTERNAL MEDICINE

## 2020-10-09 PROCEDURE — 3077F SYST BP >= 140 MM HG: CPT | Mod: CPTII,S$GLB,, | Performed by: INTERNAL MEDICINE

## 2020-10-09 PROCEDURE — 3008F PR BODY MASS INDEX (BMI) DOCUMENTED: ICD-10-PCS | Mod: CPTII,S$GLB,, | Performed by: INTERNAL MEDICINE

## 2020-10-09 PROCEDURE — 3052F HG A1C>EQUAL 8.0%<EQUAL 9.0%: CPT | Mod: CPTII,S$GLB,, | Performed by: INTERNAL MEDICINE

## 2020-10-09 PROCEDURE — 99214 OFFICE O/P EST MOD 30 MIN: CPT | Mod: S$GLB,,, | Performed by: INTERNAL MEDICINE

## 2020-10-09 PROCEDURE — 3008F BODY MASS INDEX DOCD: CPT | Mod: CPTII,S$GLB,, | Performed by: INTERNAL MEDICINE

## 2020-10-09 PROCEDURE — 3052F PR MOST RECENT HEMOGLOBIN A1C LEVEL 8.0 - < 9.0%: ICD-10-PCS | Mod: CPTII,S$GLB,, | Performed by: INTERNAL MEDICINE

## 2020-10-09 PROCEDURE — 3077F PR MOST RECENT SYSTOLIC BLOOD PRESSURE >= 140 MM HG: ICD-10-PCS | Mod: CPTII,S$GLB,, | Performed by: INTERNAL MEDICINE

## 2020-10-09 PROCEDURE — 99214 PR OFFICE/OUTPT VISIT, EST, LEVL IV, 30-39 MIN: ICD-10-PCS | Mod: S$GLB,,, | Performed by: INTERNAL MEDICINE

## 2020-10-09 NOTE — PROGRESS NOTES
CARDIOVASCULAR CONSULTATION    REASON FOR CONSULT:   Catalino Mcfadden is a 55 y.o. male who presents for for evaluation of hypertension.      HISTORY OF PRESENT ILLNESS:     Patient is a pleasant 54-year-old man.  Has been referred to me because of elevated blood pressure.  Is being followed by Podiatry also for ulcers on his feet.  States that the ulcers have been healing fine.  Denies any chest pains at rest on exertion, orthopnea, PND, swelling of feet.  ABIs were checked and were within normal limits.  States that lately has been eating a lot of salt in his diet and has not been eating well.  States that he checks his blood pressure at home and generally stays around 150-160 mm systolic.      The right ankle brachial index was 1.16 which is normal.   The left ankle brachial index was 1.27 which is normal.   The right TBI is 0.79.   The left TBI is 0.68.     1. Right lower extremity pressures and waveforms indicate no hemodynamically significant arterial occlusive disease.  2. Left lower extremity pressures and waveforms indicate no hemodynamically significant arterial occlusive disease.      Notes from February 2020:  Patient here for follow-up.  Denies any chest pains at rest on exertion, orthopnea, PND.  Blood pressure elevated in clinic today.  States has been compliant with his medications.  Denies any chest pain at rest on exertion, PND, headaches.  States that when it is a pressure medication sometimes he feels a little dizzy after taking the medications.  States that occasionally checks his blood pressure at when he gets dizzy and usually it is around 30-1 40/90 mm mercury.  However does not check his blood pressure regularly.      Notes from February 20th a 2020:  Patient here for follow-up.  Brings a blood pressure log from home.  Blood pressure much better controlled now.  At home blood pressure around 120-1 30/80 mm systolic.  Denies any symptoms of chest pains at rest on exertion, orthopnea, PND,  swelling of feet.      · Concentric left ventricular remodeling.  · Normal left ventricular systolic function. The estimated ejection fraction is 65%.  · Normal LV diastolic function.  · No wall motion abnormalities.  · Normal right ventricular systolic function.  · No pulmonary hypertension present.     Notes from October 2020:  Patient here for follow-up.  Denies any chest pains at rest on exertion, orthopnea, PND.  States that home blood pressure stays around 140-150 mm of mercury.  However has been taking his Coreg only once a day instead of twice a day as prescribed    PAST MEDICAL HISTORY:     Past Medical History:   Diagnosis Date    CKD (chronic kidney disease), stage III 07/16/2020    CKD stage 3 due to type 1 diabetes mellitus     CKD stage 3 due to type 2 diabetes mellitus     Diabetes mellitus, type 2     Diabetic foot ulcer associated with diabetes mellitus due to underlying condition 07/16/2020    Diabetic foot ulcers     Edema 08/03/2020    generalized, including genital area    Hyperlipidemia     Hypertension     Obese        PAST SURGICAL HISTORY:     Past Surgical History:   Procedure Laterality Date    BONE BIOPSY Left 7/17/2020    Procedure: BIOPSY, BONE;  Surgeon: Verona Whitmore DPM;  Location: [facility] OR;  Service: Podiatry;  Laterality: Left;    CERVICAL DISC SURGERY  07/11/2016    DEBRIDEMENT Left 7/17/2020    Procedure: DEBRIDEMENT;  Surgeon: Verona Whitmore DPM;  Location: [facility] OR;  Service: Podiatry;  Laterality: Left;    DEBRIDEMENT OF FOOT Right     toes & plantar surface    FRACTURE SURGERY Right     medial ankle, metal plate present    left leg orthopedic surgery Left        ALLERGIES AND MEDICATION:     Review of patient's allergies indicates:  No Known Allergies     Medication List          Accurate as of October 9, 2020  2:40 PM. If you have any questions, ask your nurse or doctor.            CONTINUE taking these medications    amLODIPine 10 MG tablet  Commonly  "known as: NORVASC  Take 1 tablet (10 mg total) by mouth once daily.     atorvastatin 40 MG tablet  Commonly known as: LIPITOR  Take 1 tablet (40 mg total) by mouth once daily.     BASAGLAR KWIKPEN U-100 INSULIN glargine 100 units/mL (3mL) SubQ pen  Generic drug: insulin     blood sugar diagnostic Strp  1 strip by Misc.(Non-Drug; Combo Route) route 3 (three) times daily. Patient needs One Touch Test Strips (Berio).     blood-glucose meter kit  To check BG 3 times daily, to use with insurance preferred meter; Accu check.     carvediloL 25 MG tablet  Commonly known as: COREG  Take 1 tablet (25 mg total) by mouth 2 (two) times daily.     furosemide 40 MG tablet  Commonly known as: LASIX  Take 1 tablet (40 mg total) by mouth once daily.     gabapentin 300 MG capsule  Commonly known as: NEURONTIN  Take 1 capsule (300 mg total) by mouth 2 (two) times daily.     hydrALAZINE 100 MG tablet  Commonly known as: APRESOLINE  Take 1 tablet (100 mg total) by mouth every 8 (eight) hours.     insulin aspart U-100 100 unit/mL (3 mL) Inpn pen  Commonly known as: NovoLOG  Inject 5 Units into the skin 3 (three) times daily with meals.     insulin detemir U-100 100 unit/mL (3 mL) Inpn pen  Commonly known as: LEVEMIR FLEXTOUCH  Inject 10 Units into the skin every evening.     pen needle, diabetic 32 gauge x 3/16" Ndle  1 each by Misc.(Non-Drug; Combo Route) route 5 (five) times daily.     PIPERACILLIN-TAZOBACTAM 4.5G/100ML SODIUM CHLORIDE 0.9%-READY TO MIX  Inject 100 mLs (4.5 g total) into the vein every 12 (twelve) hours.     pulse oximeter device  Commonly known as: pulse oximeter  by Apply Externally route 2 (two) times a day. Use twice daily at 8 AM and 3 PM and record the value in [employer] as directed.     TRULICITY 0.75 mg/0.5 mL pen injector  Generic drug: dulaglutide  INJECT 0.5 ML UNDER THE SKIN EVERY 7 DAYS            SOCIAL HISTORY:     Social History     Socioeconomic History    Marital status:      Spouse name: Not on " "file    Number of children: Not on file    Years of education: Not on file    Highest education level: Not on file   Occupational History    Not on file   Social Needs    Financial resource strain: Not on file    Food insecurity     Worry: Not on file     Inability: Not on file    Transportation needs     Medical: Not on file     Non-medical: Not on file   Tobacco Use    Smoking status: Never Smoker    Smokeless tobacco: Never Used   Substance and Sexual Activity    Alcohol use: Not Currently     Alcohol/week: 0.0 standard drinks     Comment: social    Drug use: No    Sexual activity: Not on file   Lifestyle    Physical activity     Days per week: Not on file     Minutes per session: Not on file    Stress: Not on file   Relationships    Social connections     Talks on phone: Not on file     Gets together: Not on file     Attends Denominational service: Not on file     Active member of club or organization: Not on file     Attends meetings of clubs or organizations: Not on file     Relationship status: Not on file   Other Topics Concern    Not on file   Social History Narrative    Not on file       FAMILY HISTORY:     Family History   Problem Relation Age of Onset    Heart disease Father        REVIEW OF SYSTEMS:   Review of Systems   Constitution: Negative.   HENT: Negative.    Eyes: Negative.    Respiratory: Negative.    Endocrine: Negative.    Hematologic/Lymphatic: Negative.    Skin: Negative.    Musculoskeletal: Negative.    Gastrointestinal: Negative.    Genitourinary: Negative.    Neurological: Negative.    Psychiatric/Behavioral: Negative.    Allergic/Immunologic: Negative.        A 10 point review of systems was performed and all the pertinent positives have been mentioned. Rest of review of systems was negative.        PHYSICAL EXAM:     Vitals:    10/09/20 1430   BP: (!) 146/86   Pulse: 97    Body mass index is 37.58 kg/m².  Weight: 129.2 kg (284 lb 13.4 oz)   Height: 6' 1" (185.4 cm) "     Physical Exam   Constitutional: He is oriented to person, place, and time. He appears well-developed and well-nourished.   HENT:   Head: Normocephalic.   Eyes: Pupils are equal, round, and reactive to light. Conjunctivae are normal.   Neck: Normal range of motion. Neck supple.   Cardiovascular: Normal rate, regular rhythm and normal heart sounds.   Pulmonary/Chest: Effort normal and breath sounds normal.   Abdominal: Soft. Bowel sounds are normal.   Neurological: He is alert and oriented to person, place, and time.   Skin: Skin is warm.   Vitals reviewed.        DATA:     Laboratory:  CBC:  Recent Labs   Lab 08/17/20  1530 08/23/20  0900 08/31/20  1700   WBC 7.45 5.09  5.09 6.23   Hemoglobin 9.6 L 8.6 L  8.6 L 9.1 L   Hematocrit 31.3 L 28.2 L  28.2 L 28.6 L   Platelets 325 251  251 240       CHEMISTRIES:  Recent Labs   Lab 07/20/20  0553 07/22/20  0508  08/05/20  0522  08/17/20  1530 08/23/20  0900 08/31/20  1700   Glucose 359 H 396 H   < > 105   < > 249 H 209 H 88   Sodium 125 L 127 L   < > 133 L   < > 133 L 136 136   Potassium 5.1 5.3 H   < > 4.6   < > 5.3 H 4.1 4.0   BUN, Bld 82 H 94 H   < > 55 H   < > 38 H 49 H 40 H   Creatinine 3.3 H 3.6 H   < > 3.7 H   < > 3.3 H 3.4 H 2.8 H   eGFR if  23 A 21 A   < > 20 A   < > 23 A 22 A 28 A   eGFR if non  20 A 18 A   < > 17 A   < > 20 A 19 A 24 A   Calcium 8.2 L 8.2 L   < > 8.4 L   < > 7.7 L 7.8 L 8.4 L   Magnesium 1.9 1.9  --  1.7  --   --   --   --     < > = values in this interval not displayed.       CARDIAC BIOMARKERS:  Recent Labs   Lab 08/03/20  2226 08/04/20  0457 08/04/20  1028 08/10/20  1545   CPK  --   --   --  43   Troponin I 0.101 H 0.126 H 0.110 H  --        COAGS:  Recent Labs   Lab 08/03/20  1703   INR 1.1       LIPIDS/LFTS:  Recent Labs   Lab 02/12/18  1209  05/27/19  0850 08/28/19  1111  08/17/20  1530 08/23/20  0900 08/31/20  1700   Cholesterol 251 H  --  230 H 173  --   --   --   --    Triglycerides 132  --  113 92   --   --   --   --    HDL 42  --  49 45  --   --   --   --    LDL Cholesterol 182.6 H  --  158.4 109.6  --   --   --   --    Non-HDL Cholesterol 209  --  181 128  --   --   --   --    AST  --    < > 37 32   < > 44 H 43 H 36   ALT  --    < > 43 41   < > 37 46 H 36    < > = values in this interval not displayed.       Hemoglobin A1C   Date Value Ref Range Status   07/17/2020 8.1 (H) 4.0 - 5.6 % Final     Comment:     ADA Screening Guidelines:  5.7-6.4%  Consistent with prediabetes  >or=6.5%  Consistent with diabetes  High levels of fetal hemoglobin interfere with the HbA1C  assay. Heterozygous hemoglobin variants (HbS, HgC, etc)do  not significantly interfere with this assay.   However, presence of multiple variants may affect accuracy.     01/27/2020 8.4 (H) 4.0 - 5.6 % Final     Comment:     ADA Screening Guidelines:  5.7-6.4%  Consistent with prediabetes  >or=6.5%  Consistent with diabetes  High levels of fetal hemoglobin interfere with the HbA1C  assay. Heterozygous hemoglobin variants (HbS, HgC, etc)do  not significantly interfere with this assay.   However, presence of multiple variants may affect accuracy.     08/28/2019 8.9 (H) 4.0 - 5.6 % Final     Comment:     ADA Screening Guidelines:  5.7-6.4%  Consistent with prediabetes  >or=6.5%  Consistent with diabetes  High levels of fetal hemoglobin interfere with the HbA1C  assay. Heterozygous hemoglobin variants (HbS, HgC, etc)do  not significantly interfere with this assay.   However, presence of multiple variants may affect accuracy.         TSH  Recent Labs   Lab 06/07/19  1447 07/18/20  1652 08/03/20  1426   TSH 0.966 0.104 L 0.472       The 10-year ASCVD risk score (Rachelfabrice ZAMARRIPA Jr., et al., 2013) is: 25.5%    Values used to calculate the score:      Age: 55 years      Sex: Male      Is Non- : Yes      Diabetic: Yes      Tobacco smoker: No      Systolic Blood Pressure: 146 mmHg      Is BP treated: Yes      HDL Cholesterol: 45 mg/dL       Total Cholesterol: 173 mg/dL             ASSESSMENT AND PLAN     Patient Active Problem List   Diagnosis    Type 2 diabetes mellitus with stage 4 chronic kidney disease, with long-term current use of insulin    Essential hypertension    Hyperlipidemia, acquired    ED (erectile dysfunction)    History of diabetic ulcer of foot    Osteomyelitis of second toe of left foot    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    Long term (current) use of antibiotics    Acute osteomyelitis of metatarsal bone of left foot    Diabetic ulcer of toe of left foot    COVID-19 virus infection    Hyponatremia    Osteomyelitis of left foot    Elevated troponin    Hypoalbuminemia       1.  Hypertension:  I have asked the patient to take his Coreg twice a day.  Continue amlodipine and hydralazine.  I have asked him to maintain a blood pressure log at home.  If the blood pressure continues to be elevated, we will titrate up other medications.      2D echocardiogram showed normal LV systolic function with mild LVH.    · Concentric left ventricular remodeling.  · Normal left ventricular systolic function. The estimated ejection fraction is 65%.  · Normal LV diastolic function.  · No wall motion abnormalities.  · Normal right ventricular systolic function.  · No pulmonary hypertension present.       2.  Chronic kidney disease:  Management per Nephrology    Follow-up in Cardiology Clinic in 1 month.      3.  Recent COVID infection      Thank you very much for involving me in the care of your patient.  Please do not hesitate to contact me if there are any questions.      Marylou Shaver MD, FACC, [facility]  Interventional Cardiologist, [facility].           This note was dictated with the help of speech recognition software.  There might be un-intended errors and/or substitutions.

## 2020-10-13 ENCOUNTER — HOSPITAL ENCOUNTER (OUTPATIENT)
Dept: WOUND CARE | Facility: HOSPITAL | Age: 55
Discharge: HOME | End: 2020-10-13
Attending: FAMILY MEDICINE
Payer: COMMERCIAL

## 2020-10-13 VITALS
RESPIRATION RATE: 18 BRPM | HEART RATE: 90 BPM | SYSTOLIC BLOOD PRESSURE: 137 MMHG | DIASTOLIC BLOOD PRESSURE: 74 MMHG | TEMPERATURE: 98 F

## 2020-10-13 PROCEDURE — 97605 NEG PRS WND THER DME<=50SQCM: CPT

## 2020-10-16 ENCOUNTER — HOSPITAL ENCOUNTER (OUTPATIENT)
Dept: WOUND CARE | Facility: HOSPITAL | Age: 55
Discharge: HOME | End: 2020-10-16
Attending: PODIATRIST
Payer: COMMERCIAL

## 2020-10-16 VITALS — DIASTOLIC BLOOD PRESSURE: 82 MMHG | SYSTOLIC BLOOD PRESSURE: 162 MMHG | HEART RATE: 82 BPM | TEMPERATURE: 98 F

## 2020-10-16 DIAGNOSIS — L97.526 DIABETIC ULCER OF LEFT FOOT ASSOCIATED WITH TYPE 2 DIABETES MELLITUS, WITH BONE INVOLVEMENT WITHOUT EVIDENCE OF NECROSIS: Primary | ICD-10-CM

## 2020-10-16 DIAGNOSIS — E11.49 TYPE II DIABETES MELLITUS WITH NEUROLOGICAL MANIFESTATIONS: ICD-10-CM

## 2020-10-16 DIAGNOSIS — E11.621 DIABETIC ULCER OF LEFT FOOT ASSOCIATED WITH TYPE 2 DIABETES MELLITUS, WITH BONE INVOLVEMENT WITHOUT EVIDENCE OF NECROSIS: Primary | ICD-10-CM

## 2020-10-16 PROCEDURE — 15275 SKIN SUB GRAFT FACE/NK/HF/G: CPT | Mod: ,,, | Performed by: PODIATRIST

## 2020-10-16 PROCEDURE — 15275 SKIN SUB GRAFT FACE/NK/HF/G: CPT | Performed by: PODIATRIST

## 2020-10-16 PROCEDURE — 15275 SKIN SUB GRAFT FACE/NK/HF/G: CPT

## 2020-10-16 PROCEDURE — 99499 NO LOS: ICD-10-PCS | Mod: ,,, | Performed by: PODIATRIST

## 2020-10-16 PROCEDURE — 99499 UNLISTED E&M SERVICE: CPT | Mod: ,,, | Performed by: PODIATRIST

## 2020-10-16 PROCEDURE — 15275 PR SKIN SUB GRAFT FACE/NK/HF/G UP TO 100 SQCM: ICD-10-PCS | Mod: ,,, | Performed by: PODIATRIST

## 2020-10-16 RX ORDER — FUROSEMIDE 40 MG/1
40 TABLET ORAL DAILY
Qty: 30 TABLET | Refills: 0 | Status: SHIPPED | OUTPATIENT
Start: 2020-10-16 | End: 2021-02-22

## 2020-10-16 NOTE — PROGRESS NOTES
[facility]                Progress Note    Subjective:       Patient ID: Catalino Mcfadden is a 55 y.o. male.    Chief Complaint: No chief complaint on file.    Patient ambulated to exam bed without assistance. No pain reported. Patient has been standing up to vote today and states that he felt LLE swelling over time. Red/beefy granulation tissue with minor bleeding to wound. Noted NEW BONE exposure within wound bed. Neox reapplied today. Patient instructed to be sure to elevate often to reduce swelling to LLE. Patient states that he will bring chair to go vote next time. Patient also noted to have generalized swelling to body that includes face. Patient states that he will be visiting his PCP soon and will ask about diuretics. Will continue with nurse visits to change dressings while graft in place.       Review of Systems   Constitutional: Negative for appetite change, chills and fever.   Respiratory: Negative for cough, shortness of breath and wheezing.    Cardiovascular: Positive for leg swelling. Negative for chest pain.   Gastrointestinal: Negative for diarrhea, nausea and vomiting.   Musculoskeletal: Positive for arthralgias, joint swelling and myalgias.   Skin: Positive for wound.   Neurological: Positive for numbness and headaches. Negative for weakness.        + paresthesia          Objective:        Physical Exam  Vitals signs and nursing note reviewed.   Constitutional:       General: He is not in acute distress.     Appearance: He is not toxic-appearing or diaphoretic.      Comments: Pt. is well-developed, well-nourished, appears stated age, in no acute distress, alert and oriented x 3. No evidence of depression, anxiety, or agitation. Calm, cooperative, and communicative. Appropriate interactions and affect.   Cardiovascular:      Pulses:           Dorsalis pedis pulses are 2+ on the right side and 2+ on the left side.        Posterior tibial pulses are 1+ on  the right side and 1+ on the left side.      Comments: There is decreased digital hair. Skin is atrophic, slightly hyperpigmented  Pulmonary:      Effort: No respiratory distress.   Musculoskeletal:      Right ankle: He exhibits normal range of motion and no swelling. No tenderness. No lateral malleolus, no medial malleolus, no AITFL, no CF ligament and no posterior TFL tenderness found. Achilles tendon exhibits no pain, no defect and normal Hilliard's test results.      Left ankle: He exhibits normal range of motion and no swelling. No tenderness. No lateral malleolus, no medial malleolus, no AITFL, no CF ligament and no posterior TFL tenderness found. Achilles tendon exhibits no pain, no defect and normal Hilliard's test results.      Right foot: No tenderness or bony tenderness.      Left foot: No tenderness or bony tenderness.      Comments: Decreased stride, station of gait.  apropulsive toe off.  Increased angle and base of gait.    There is equinus deformity bilateral with decreased dorsiflexion at the ankle joint bilateral. No tenderness with compression of heel. Negative tinels sign. Gait analysis reveals excessive pronation through midstance and propulsion with early heel off.     Patient has hammertoes of digits 2-5 bilateral partially reducible     Decreased first MPJ range of motion both weightbearing and nonweightbearing, no crepitus observed the first MP joint, + dorsal flag sign.     Visible and palpable bunion without pain at dorsomedial 1st metatarsal head right and left.  Hallux abducted right and left partially reducible, tracks laterally without being track bound.  No ecchymosis, erythema, edema, or cardinal signs infection or signs of trauma same foot.    Fat pad atrophy to heels and met heads bilateral    Plantarflexed 1st ray RLE   Lymphadenopathy:      Comments: No lymphatic streaking    Negative lymphadenopathy bilateral popliteal fossa and tarsal tunnel.     Skin:     General: Skin is warm  and dry.      Coloration: Skin is not pale.      Findings: Erythema and lesion (see wound description below) present. No abrasion, ecchymosis, laceration or rash.      Nails: There is no clubbing.        Comments: Toenails 1-5 bilaterally discolored/yellowed, dystrophic, brittle with subungual debris.    Neurological:      Sensory: Sensory deficit present.      Comments:  Success-[place] 5.07 monofilamant testing is diminished Kp feet. Decreased/absent vibratory sensation bilateral feet to 128Hz tuning fork.     Paresthesias, and hyperesthesia bilateral feet with no clearly identified trigger or source.           Psychiatric:         Attention and Perception: He is attentive.         Mood and Affect: Mood is not anxious. Affect is not inappropriate.         Speech: He is communicative. Speech is not slurred.         Behavior: Behavior is not combative.         Vitals:    10/16/20 1101   BP: (!) 162/82   Pulse: 82   Temp: 97.5 °F (36.4 °C)       Assessment:           ICD-10-CM ICD-9-CM   1. 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  E11.621 250.80    L97.526 707.15            Negative Pressure Wound Therapy  08/21/20 1007 Left lateral (Active)   08/21/20 1007   Side: Left   Orientation: lateral   Location: Foot   Additional Comments:    Location:    SDO Location:    NPWT Type Vacuum Therapy 10/16/20 1100   Therapy Setting NPWT Continuous therapy 10/16/20 1100   Pressure Setting NPWT 125 mmHg 10/16/20 1100   Therapy Interventions NPWT Dressing changed;Seal intact 10/16/20 1100   Sponges Inserted NPWT Black;2 10/16/20 1100   Sponges Removed NPWT Black;2 10/16/20 1100   General Output (mL) 20 10/16/20 1100            Incision/Site 07/17/20 1251 Left Foot (Active)   07/17/20 1251   Present Prior to Hospital Arrival?:    Side: Left   Location: Foot   Orientation:    Incision Type:    Closure Method: Other (see comments)   Additional Comments:   open wound .betadine soak in 4x4's, abd, ace, castpadding, surgical boot   Removal Indication and Assessment:    Wound Outcome:    Removal Indications:    Wound Image   10/16/20 1100   Incision WDL ex 10/16/20 1100   Dressing Appearance Moist drainage 10/16/20 1100   Drainage Amount Moderate 10/16/20 1100   Drainage Characteristics/Odor Sanguineous 10/16/20 1100   Appearance Red;Granulating 10/16/20 1100   Red (%), Wound Tissue Color 100 % 10/16/20 1100   Periwound Area Intact 10/16/20 1100   Wound Edges Open 10/16/20 1100   Wound Length (cm) 2.9 cm 10/16/20 1100   Wound Width (cm) 2.6 cm 10/16/20 1100   Wound Depth (cm) 0.25 cm 10/16/20 1100   Wound Volume (cm^3) 1.88 cm^3 10/16/20 1100   Wound Surface Area (cm^2) 7.54 cm^2 10/16/20 1100   Care Cleansed with:;Soap and water;Sterile normal saline 10/16/20 1100   Dressing Changed 10/16/20 1100   Off Loading Football dressing 10/16/20 1100           Plan:                Wound is measuring smaller however bone noted to the distal lateral aspect of the wound bed. Debridement performed. Neox (graft) with sutures applied.  orders on hold due to graft. Pt will come for a nurse visit Tuesday. Discussed elevating as much as possible.      NEOX OP REPORT    SURGEON: Aviva Martinez DPM  ASSITANT: None  PRE-OP DX: Ulceration secondary to diabetes mellitus and peripheral neuropathy with sluggish response to prior treatment.  POST-OP DX: same.  ANESTHESIA: Pt. has loss of sensation and therefore no anesthesia was required.  HEMOSTASIS: pressure  EBL: less than 5cc.    Time Out performed to verify patient and site and consent obtained.    Procedure: The patient was seen in the wound clinic room and placed in supine position on the treatment table.  Attention was directed to the ulcer which was located on the left plantar lateral foot, where an ulceration measuring  2.9 x 2.6 x 0.25 cm is noted.  The ulceration was had a  mild callused rim with periwound maceration.  No acute  signs of infection were noted.  The wound is nontender.  Utilizing a 3 mm curette, I debrided 100% of the wound full-thickness through subcutaneous tissue to excise viable and nonviable tissue outside the wound margins.  Patient tolerated well.  The wound was flushed with sterile saline.  There was minimal bleeding with debridement which was well controlled with direct pressure.  A 3 x 3 cm NEOX sheet was then inspected and noted to be in acceptable.  It was then removed sterilely .  The sheet was then fenestrated with the scalpel and then cut and sized and shaped to the wound.  I utilized the entirety with overlapping edges against the wound.  The NEOX was adhered down with saline soaked cotton swabs and then secured in place 3-0 prolene and covered with adaptic touch non-adherent layer followed by placement of VAC.  The patient having tolerated the procedure well left the clinic with all vital signs stable and vascular status intact to all digits of both feet.     Short-term goals including promoting granulation and epithelialization of the wound. Long term goals include maintaining a healed wound with appropriate offloading and good medical management per internist.    Offloading: CAM boot    Orders Placed This Encounter   Procedures    Change dressing     Clean wound with saline. Neox, adaptic touch, steristrips applied per DPM Michelle. NPWT with black sponge and suction at 125 mmHg low continuous. Change outer dressing only during nurse visit, if possible. Reapply adaptic touch/steristrips if removed with transparent drape only, otherwise leave intact.        Follow up in about 4 days (around 10/20/2020) for nurse visit.

## 2020-10-16 NOTE — TELEPHONE ENCOUNTER
----- Message from Yukowhit Schmitz sent at 10/16/2020  2:14 PM CDT -----  Contact: Self [phone]  Type: RX Refill Request    Who Called: Self    Have you contacted your pharmacy:No    Refill or New Rx:Refill    RX Name and Strength:furosemide (LASIX) 40 MG tablet    Preferred Pharmacy with phone number:  [id number] [place] [id number]  [address] [place] [address] [address]  [facility] [place] [address]  Phone: [phone] Fax: [phone]      Local or Mail Order:Local       Would the patient rather a call back or a response via [facility]? Call Back    Best Call Back Number:[phone]

## 2020-10-19 ENCOUNTER — PATIENT OUTREACH (OUTPATIENT)
Dept: ADMINISTRATIVE | Facility: HOSPITAL | Age: 55
End: 2020-10-19

## 2020-10-19 ENCOUNTER — TELEPHONE (OUTPATIENT)
Dept: WOUND CARE | Facility: HOSPITAL | Age: 55
End: 2020-10-19

## 2020-10-19 DIAGNOSIS — Z12.11 COLON CANCER SCREENING: Primary | ICD-10-CM

## 2020-10-20 ENCOUNTER — HOSPITAL ENCOUNTER (OUTPATIENT)
Dept: WOUND CARE | Facility: HOSPITAL | Age: 55
Discharge: HOME | End: 2020-10-20
Attending: PODIATRIST
Payer: COMMERCIAL

## 2020-10-20 VITALS
HEART RATE: 84 BPM | SYSTOLIC BLOOD PRESSURE: 159 MMHG | DIASTOLIC BLOOD PRESSURE: 83 MMHG | RESPIRATION RATE: 17 BRPM | TEMPERATURE: 98 F

## 2020-10-20 DIAGNOSIS — E11.621 DIABETIC ULCER OF LEFT FOOT ASSOCIATED WITH TYPE 2 DIABETES MELLITUS, WITH BONE INVOLVEMENT WITHOUT EVIDENCE OF NECROSIS: Primary | ICD-10-CM

## 2020-10-20 DIAGNOSIS — L97.526 DIABETIC ULCER OF LEFT FOOT ASSOCIATED WITH TYPE 2 DIABETES MELLITUS, WITH BONE INVOLVEMENT WITHOUT EVIDENCE OF NECROSIS: Primary | ICD-10-CM

## 2020-10-20 PROCEDURE — 97605 NEG PRS WND THER DME<=50SQCM: CPT

## 2020-10-20 NOTE — PROGRESS NOTES
[facility]  CONSTANZA Newell  [id number]  Nurse Visit    Subjective:       Patient seen in clinic today.  Dressing changed as ordered.      Assessment:          Negative Pressure Wound Therapy  08/21/20 1007 Left lateral (Active)   08/21/20 1007   Side: Left   Orientation: lateral   Location: Foot   Additional Comments:    Location:    SDO Location:    NPWT Type Vacuum Therapy 10/20/20 1400   Therapy Setting NPWT Continuous therapy 10/20/20 1400   Pressure Setting NPWT 125 mmHg 10/20/20 1400   Therapy Interventions NPWT Dressing changed;Canister changed;Seal intact 10/20/20 1400   Sponges Inserted NPWT Black;1 10/20/20 1400   Sponges Removed NPWT Black;1 10/20/20 1400   General Output (mL) 25 10/20/20 1400            Incision/Site 07/17/20 1251 Left Foot (Active)   07/17/20 1251   Present Prior to Hospital Arrival?:    Side: Left   Location: Foot   Orientation:    Incision Type:    Closure Method: Other (see comments)   Additional Comments:  open wound .betadine soak in 4x4's, abd, ace, castpadding, surgical boot   Removal Indication and Assessment:    Wound Outcome:    Removal Indications:    Incision WDL ex 10/20/20 1400   Dressing Appearance Intact;Moist drainage 10/20/20 1400   Drainage Amount Moderate 10/20/20 1400   Drainage Characteristics/Odor Serosanguineous 10/20/20 1400   Appearance Red;White 10/20/20 1400   Black (%), Wound Tissue Color 0 % 10/20/20 1400   Red (%), Wound Tissue Color 100 % 10/20/20 1400   Yellow (%), Wound Tissue Color 0 % 10/20/20 1400   Periwound Area Scar tissue 10/20/20 1400   Wound Edges Defined 10/20/20 1400   Tunneling (depth (cm)/location) 0 10/20/20 1400   Undermining (depth (cm)/location) 0 10/20/20 1400   Care Soap and water;Sterile normal saline;Wound cleanser 10/20/20 1400   Dressing Applied;Other (see comments);Foam 10/20/20 1400   Compression Tubular elasticized bandage 10/20/20 1400   Off Loading Football dressing 10/20/20 1400    Dressing Change Due 10/23/20 10/20/20 1400           Plan:     No orders of the defined types were placed in this encounter.          No follow-ups on file.

## 2020-10-21 ENCOUNTER — OFFICE VISIT (OUTPATIENT)
Dept: FAMILY MEDICINE | Facility: CLINIC | Age: 55
End: 2020-10-21
Payer: COMMERCIAL

## 2020-10-21 ENCOUNTER — DOCUMENT SCAN (OUTPATIENT)
Dept: HOME HEALTH SERVICES | Facility: HOSPITAL | Age: 55
End: 2020-10-21
Payer: COMMERCIAL

## 2020-10-21 ENCOUNTER — LAB VISIT (OUTPATIENT)
Dept: LAB | Facility: HOSPITAL | Age: 55
End: 2020-10-21
Attending: FAMILY MEDICINE
Payer: COMMERCIAL

## 2020-10-21 VITALS
WEIGHT: 267 LBS | SYSTOLIC BLOOD PRESSURE: 148 MMHG | BODY MASS INDEX: 35.39 KG/M2 | TEMPERATURE: 98 F | OXYGEN SATURATION: 98 % | HEART RATE: 80 BPM | DIASTOLIC BLOOD PRESSURE: 80 MMHG | RESPIRATION RATE: 16 BRPM | HEIGHT: 73 IN

## 2020-10-21 DIAGNOSIS — I10 ESSENTIAL HYPERTENSION: Chronic | ICD-10-CM

## 2020-10-21 DIAGNOSIS — E11.621 DIABETIC ULCER OF LEFT MIDFOOT ASSOCIATED WITH TYPE 2 DIABETES MELLITUS, WITH BONE INVOLVEMENT WITHOUT EVIDENCE OF NECROSIS: ICD-10-CM

## 2020-10-21 DIAGNOSIS — Z00.00 WELL ADULT EXAM: Primary | ICD-10-CM

## 2020-10-21 DIAGNOSIS — E11.22 TYPE 2 DIABETES MELLITUS WITH STAGE 4 CHRONIC KIDNEY DISEASE, WITH LONG-TERM CURRENT USE OF INSULIN: ICD-10-CM

## 2020-10-21 DIAGNOSIS — Z79.4 TYPE 2 DIABETES MELLITUS WITH STAGE 4 CHRONIC KIDNEY DISEASE, WITH LONG-TERM CURRENT USE OF INSULIN: ICD-10-CM

## 2020-10-21 DIAGNOSIS — N18.4 TYPE 2 DIABETES MELLITUS WITH STAGE 4 CHRONIC KIDNEY DISEASE, WITH LONG-TERM CURRENT USE OF INSULIN: ICD-10-CM

## 2020-10-21 DIAGNOSIS — Z23 FLU VACCINE NEED: ICD-10-CM

## 2020-10-21 DIAGNOSIS — E11.9 TYPE 2 DIABETES MELLITUS WITHOUT COMPLICATION: ICD-10-CM

## 2020-10-21 DIAGNOSIS — L97.426 DIABETIC ULCER OF LEFT MIDFOOT ASSOCIATED WITH TYPE 2 DIABETES MELLITUS, WITH BONE INVOLVEMENT WITHOUT EVIDENCE OF NECROSIS: ICD-10-CM

## 2020-10-21 LAB
CHOLEST SERPL-MCNC: 238 MG/DL (ref 120–199)
CHOLEST/HDLC SERPL: 4.3 {RATIO} (ref 2–5)
HDLC SERPL-MCNC: 56 MG/DL (ref 40–75)
HDLC SERPL: 23.5 % (ref 20–50)
LDLC SERPL CALC-MCNC: 162.4 MG/DL (ref 63–159)
NONHDLC SERPL-MCNC: 182 MG/DL
TRIGL SERPL-MCNC: 98 MG/DL (ref 30–150)

## 2020-10-21 PROCEDURE — 3052F HG A1C>EQUAL 8.0%<EQUAL 9.0%: CPT | Mod: CPTII,S$GLB,, | Performed by: FAMILY MEDICINE

## 2020-10-21 PROCEDURE — 36415 COLL VENOUS BLD VENIPUNCTURE: CPT | Mod: PO

## 2020-10-21 PROCEDURE — 99999 PR PBB SHADOW E&M-EST. PATIENT-LVL IV: CPT | Mod: PBBFAC,,, | Performed by: FAMILY MEDICINE

## 2020-10-21 PROCEDURE — 3077F PR MOST RECENT SYSTOLIC BLOOD PRESSURE >= 140 MM HG: ICD-10-PCS | Mod: CPTII,S$GLB,, | Performed by: FAMILY MEDICINE

## 2020-10-21 PROCEDURE — 3079F PR MOST RECENT DIASTOLIC BLOOD PRESSURE 80-89 MM HG: ICD-10-PCS | Mod: CPTII,S$GLB,, | Performed by: FAMILY MEDICINE

## 2020-10-21 PROCEDURE — 90471 IMMUNIZATION ADMIN: CPT | Mod: S$GLB,,, | Performed by: FAMILY MEDICINE

## 2020-10-21 PROCEDURE — 3008F PR BODY MASS INDEX (BMI) DOCUMENTED: ICD-10-PCS | Mod: CPTII,S$GLB,, | Performed by: FAMILY MEDICINE

## 2020-10-21 PROCEDURE — 99396 PREV VISIT EST AGE 40-64: CPT | Mod: 25,S$GLB,, | Performed by: FAMILY MEDICINE

## 2020-10-21 PROCEDURE — 3052F PR MOST RECENT HEMOGLOBIN A1C LEVEL 8.0 - < 9.0%: ICD-10-PCS | Mod: CPTII,S$GLB,, | Performed by: FAMILY MEDICINE

## 2020-10-21 PROCEDURE — 3079F DIAST BP 80-89 MM HG: CPT | Mod: CPTII,S$GLB,, | Performed by: FAMILY MEDICINE

## 2020-10-21 PROCEDURE — 3077F SYST BP >= 140 MM HG: CPT | Mod: CPTII,S$GLB,, | Performed by: FAMILY MEDICINE

## 2020-10-21 PROCEDURE — 90686 FLU VACCINE (QUAD) GREATER THAN OR EQUAL TO 3YO PRESERVATIVE FREE IM: ICD-10-PCS | Mod: S$GLB,,, | Performed by: FAMILY MEDICINE

## 2020-10-21 PROCEDURE — 99999 PR PBB SHADOW E&M-EST. PATIENT-LVL IV: ICD-10-PCS | Mod: PBBFAC,,, | Performed by: FAMILY MEDICINE

## 2020-10-21 PROCEDURE — 80061 LIPID PANEL: CPT

## 2020-10-21 PROCEDURE — 3008F BODY MASS INDEX DOCD: CPT | Mod: CPTII,S$GLB,, | Performed by: FAMILY MEDICINE

## 2020-10-21 PROCEDURE — 90686 IIV4 VACC NO PRSV 0.5 ML IM: CPT | Mod: S$GLB,,, | Performed by: FAMILY MEDICINE

## 2020-10-21 PROCEDURE — 90471 FLU VACCINE (QUAD) GREATER THAN OR EQUAL TO 3YO PRESERVATIVE FREE IM: ICD-10-PCS | Mod: S$GLB,,, | Performed by: FAMILY MEDICINE

## 2020-10-21 PROCEDURE — 99396 PR PREVENTIVE VISIT,EST,40-64: ICD-10-PCS | Mod: 25,S$GLB,, | Performed by: FAMILY MEDICINE

## 2020-10-21 NOTE — PROGRESS NOTES
Health Maintenance Due   Topic     Shingles Vaccine (1 of 2) No hx chickenpox ; inform pt can get vaccine at pharmacy.    Eye Exam  pt states will schedule eye exam near home    Colorectal Cancer Screening  Consult with PCP     Influenza Vaccine (1) Ok to get today    Lipid Panel  Ok to get today    Hemoglobin A1c  Ok to get today

## 2020-10-21 NOTE — PROGRESS NOTES
Chief Complaint   Patient presents with    Hypertension     follow up        Catalinodedra Mcfadden is a 55 y.o. male who presents per the Chief Complaint.  Pt is known to me and was last seen by me on 9/14/2020.  All known chronic medical issues have been documented.    Hypertension  This is a chronic problem. The current episode started more than 1 year ago. The problem is unchanged. The problem is controlled. Associated symptoms include neck pain (improved) and shortness of breath (resolved). Pertinent negatives include no anxiety, blurred vision, chest pain, headaches, palpitations, peripheral edema or sweats. There are no associated agents to hypertension. Risk factors for coronary artery disease include diabetes mellitus, dyslipidemia, male gender and obesity. Past treatments include beta blockers, calcium channel blockers, diuretics and ACE inhibitors. The current treatment provides moderate improvement. There are no compliance problems.  Hypertensive end-organ damage includes kidney disease and retinopathy. There is no history of angina, CAD/MI, CVA, heart failure, left ventricular hypertrophy or PVD. Identifiable causes of hypertension include chronic renal disease. There is no history of coarctation of the aorta, hyperaldosteronism, hypercortisolism, hyperparathyroidism, a hypertension causing med, pheochromocytoma, renovascular disease, sleep apnea or a thyroid problem.   Diabetes  He presents for his follow-up diabetic visit. He has type 2 diabetes mellitus. No MedicAlert identification noted. The initial diagnosis of diabetes was made 20 years ago. His disease course has been improving. Pertinent negatives for hypoglycemia include no confusion, dizziness, headaches, hunger, mood changes, nervousness/anxiousness, pallor, seizures, sleepiness, speech difficulty, sweats or tremors. Associated symptoms include foot paresthesias. Pertinent negatives for diabetes include no blurred vision, no chest pain, no fatigue, no  foot ulcerations, no polydipsia, no polyphagia, no polyuria, no visual change, no weakness and no weight loss. There are no hypoglycemic complications. Pertinent negatives for hypoglycemia complications include no blackouts, no hospitalization, no nocturnal hypoglycemia, no required assistance and no required glucagon injection. Symptoms are improving. Diabetic complications include nephropathy, peripheral neuropathy and retinopathy. Pertinent negatives for diabetic complications include no autonomic neuropathy, CVA, heart disease or PVD. Risk factors for coronary artery disease include hypertension, obesity, sedentary lifestyle and diabetes mellitus. Current diabetic treatment includes diet and oral agent (dual therapy). He is compliant with treatment all of the time. His weight is decreasing steadily. He is following a generally healthy, low fat/cholesterol and low salt diet. Meal planning includes avoidance of concentrated sweets. He has not had a previous visit with a dietitian. He participates in exercise intermittently. He monitors blood glucose at home 1-2 x per day. He monitors urine at home <1 x per month. Blood glucose monitoring compliance is fair. His home blood glucose trend is decreasing steadily. An ACE inhibitor/angiotensin II receptor blocker is being taken. He sees a podiatrist.Eye exam is not current.       ROS  Review of Systems   Constitutional: Negative.  Negative for activity change, appetite change, chills, diaphoresis, fatigue, fever, unexpected weight change and weight loss.   HENT: Negative.  Negative for congestion, ear pain, hearing loss, nosebleeds, postnasal drip, rhinorrhea, sinus pressure, sneezing, sore throat and trouble swallowing.    Eyes: Negative for blurred vision, pain, discharge and visual disturbance.   Respiratory: Positive for shortness of breath (resolved). Negative for cough, choking, chest tightness and wheezing.    Cardiovascular: Negative for chest pain, palpitations  "and leg swelling.   Gastrointestinal: Negative for abdominal pain, blood in stool, constipation, diarrhea, nausea and vomiting.   Endocrine: Negative for polydipsia, polyphagia and polyuria.   Genitourinary: Negative for difficulty urinating, dysuria, flank pain, frequency, hematuria, penile pain, penile swelling, scrotal swelling, testicular pain and urgency.   Musculoskeletal: Positive for gait problem and neck pain (improved). Negative for arthralgias, back pain, joint swelling, myalgias and neck stiffness.   Skin: Positive for wound (healing; wound vac in place). Negative for color change, pallor and rash.   Allergic/Immunologic: Negative for environmental allergies, food allergies and immunocompromised state.   Neurological: Negative for dizziness, tremors, seizures, syncope, speech difficulty, weakness, light-headedness and headaches.   Psychiatric/Behavioral: Negative.  Negative for confusion, decreased concentration, dysphoric mood and sleep disturbance. The patient is not nervous/anxious.        Physical Exam  Vitals:    10/21/20 1307   BP: (!) 148/80   Pulse: 80   Resp: 16   Temp: 98.4 °F (36.9 °C)    Body mass index is 35.22 kg/m².  Weight: 121.1 kg (266 lb 15.6 oz)(boot cast on left foot)   Height: 6' 1" (185.4 cm)     Physical Exam  Vitals signs reviewed.   Constitutional:       General: He is not in acute distress.     Appearance: Normal appearance. He is well-developed. He is not ill-appearing, toxic-appearing or diaphoretic.   HENT:      Head: Normocephalic and atraumatic.      Right Ear: Hearing, tympanic membrane, ear canal and external ear normal. No decreased hearing noted. No tenderness. No foreign body. No mastoid tenderness. No hemotympanum. Tympanic membrane is not injected, scarred, perforated, erythematous, retracted or bulging.      Left Ear: Hearing, tympanic membrane, ear canal and external ear normal. No decreased hearing noted. No tenderness. No foreign body. No mastoid tenderness. No " hemotympanum. Tympanic membrane is not injected, scarred, perforated, erythematous, retracted or bulging.      Nose: Nose normal. No nasal deformity, septal deviation or rhinorrhea.      Mouth/Throat:      Dentition: Normal dentition. Does not have dentures. No dental caries.      Pharynx: Uvula midline. No oropharyngeal exudate, posterior oropharyngeal erythema or uvula swelling.      Tonsils: No tonsillar abscesses.   Eyes:      General: Lids are normal. No scleral icterus.        Right eye: No foreign body, discharge or hordeolum.         Left eye: No foreign body, discharge or hordeolum.      Conjunctiva/sclera: Conjunctivae normal.      Right eye: No chemosis or exudate.     Left eye: No chemosis or exudate.     Pupils: Pupils are equal, round, and reactive to light.   Neck:      Musculoskeletal: Full passive range of motion without pain, normal range of motion and neck supple.      Thyroid: No thyroid mass or thyromegaly.   Cardiovascular:      Rate and Rhythm: Normal rate and regular rhythm.      Heart sounds: Normal heart sounds, S1 normal and S2 normal. No murmur. No friction rub. No gallop.    Pulmonary:      Effort: Pulmonary effort is normal. No accessory muscle usage or respiratory distress.      Breath sounds: Normal breath sounds. No decreased breath sounds, wheezing, rhonchi or rales.   Abdominal:      General: Bowel sounds are normal. There is no distension.      Palpations: Abdomen is soft. Abdomen is not rigid. There is no mass.      Tenderness: There is no abdominal tenderness. There is no guarding or rebound.      Hernia: No hernia is present.   Musculoskeletal:      Left ankle: He exhibits decreased range of motion.      Cervical back: He exhibits tenderness and pain. He exhibits normal range of motion, no bony tenderness, no swelling, no edema, no deformity, no laceration, no spasm and normal pulse.        Feet:       Comments: Left foot in surgical boot   Lymphadenopathy:      Head:       Right side of head: No submental, submandibular, preauricular or posterior auricular adenopathy.      Left side of head: No submental, submandibular, preauricular or posterior auricular adenopathy.      Cervical: No cervical adenopathy.   Skin:     General: Skin is warm and dry.      Coloration: Skin is not pale.      Findings: No rash.   Neurological:      Mental Status: He is alert and oriented to person, place, and time.      Cranial Nerves: No cranial nerve deficit.      Sensory: No sensory deficit.      Motor: No tremor, atrophy, abnormal muscle tone or seizure activity.      Coordination: Coordination normal.      Gait: Gait normal.   Psychiatric:         Attention and Perception: He is attentive.         Speech: Speech normal.         Behavior: Behavior normal. Behavior is cooperative.         Thought Content: Thought content normal.         Judgment: Judgment normal.         Assessment & Plan    Discussion of plan of care including treatment options regarding health and wellness were reviewed and discussed with patient.  Any changes to medication or treatment plan, as well as any screening blood test, imaging, or referrals to specialist, are documented.  Follow up as indicated.     1. Well adult exam  Discussed age and gender appropriate screenings at this visit and encouraged a healthy diet with low saturated fats, and increased physical activity.  Counseled on medically appropriate vaccines based on age and current health condition.  Screening test will be ordered and once completed, patient will be notified of results when available.  If necessary, will follow up to discuss and manage further.   - CBC auto differential; Future  - Comprehensive Metabolic Panel; Future  - Vitamin D; Future  - TSH; Future  - T4, Free; Future  - Hemoglobin A1C; Future  - PSA, Screening; Future    2. Diabetic ulcer of left midfoot associated with type 2 diabetes mellitus, with bone involvement without evidence of  necrosis  Wound vac in place; managed by Podiatry.  Wound is improving.    3. Type 2 diabetes mellitus with stage 4 chronic kidney disease, with long-term current use of insulin  Patient is encouraged to follow a diet low in carbohydrates and simple sugars.  Discussed simple vs. complex carbohydrates as well as eating times of certain meals. Advised to focus on good food choices and increased physical activity and encouraged to adhere to medication regimen and/or lifestyle adjustments, and to check glucose level as recommended.  Contact office if glucose levels are not improving over time.  Will monitor HbA1c appropriately.     4. Essential hypertension  Patient was counseled and encouraged to maintain a low sodium diet, as well as increasing physical activity.  Recommend random BP checks at home on a regular basis.  Repeat BP at end of visit was not improved. Will continue medication at this time, and follow up in 4-6 weeks, or sooner if blood pressure does not improve over time.  Patient advised to return for nurse BP check in two weeks.     5. Flu vaccine need  Patient requested Flu vaccine, and was consented and vaccine given in office without complication.   - Influenza - Quadrivalent *Preferred* (6 months+) (PF)       Follow up in about 3 months (around 1/21/2021).      ACTIVE MEDICAL ISSUES:  Documented in Problem List    PAST MEDICAL HISTORY  Documented  .  PAST SURGICAL HISTORY:  Documented    SOCIAL HISTORY:  Documented    FAMILY HISTORY:  Documented    ALLERGIES AND MEDICATIONS: updated and reviewed.  Documented    Health Maintenance       Date Due Completion Date    HIV Screening 04/04/1979 ---    TETANUS VACCINE 04/04/1982 ---    Shingles Vaccine (1 of 2) 04/04/2014 ---    Eye Exam 04/03/2019 4/3/2018    Colorectal Cancer Screening 06/04/2019 6/3/2019    Override on 9/26/2014: Done (future)    Influenza Vaccine (1) 08/01/2020 3/10/2020    Lipid Panel 08/28/2020 8/28/2019    Hemoglobin A1c 10/17/2020  7/17/2020    Foot Exam 08/04/2021 8/4/2020    Override on 5/25/2020: Done    Override on 4/27/2020: Done    Override on 4/20/2020: Done    Low Dose Statin 10/09/2021 10/9/2020

## 2020-10-23 ENCOUNTER — HOSPITAL ENCOUNTER (OUTPATIENT)
Dept: WOUND CARE | Facility: HOSPITAL | Age: 55
Discharge: HOME | End: 2020-10-23
Attending: FAMILY MEDICINE
Payer: COMMERCIAL

## 2020-10-23 VITALS
RESPIRATION RATE: 18 BRPM | HEART RATE: 82 BPM | TEMPERATURE: 97 F | SYSTOLIC BLOOD PRESSURE: 140 MMHG | DIASTOLIC BLOOD PRESSURE: 85 MMHG

## 2020-10-23 DIAGNOSIS — L97.526 DIABETIC ULCER OF LEFT FOOT ASSOCIATED WITH TYPE 2 DIABETES MELLITUS, WITH BONE INVOLVEMENT WITHOUT EVIDENCE OF NECROSIS: Primary | ICD-10-CM

## 2020-10-23 DIAGNOSIS — E11.621 DIABETIC ULCER OF LEFT FOOT ASSOCIATED WITH TYPE 2 DIABETES MELLITUS, WITH BONE INVOLVEMENT WITHOUT EVIDENCE OF NECROSIS: Primary | ICD-10-CM

## 2020-10-23 PROCEDURE — 99214 PR OFFICE/OUTPT VISIT, EST, LEVL IV, 30-39 MIN: ICD-10-PCS | Mod: ,,, | Performed by: FAMILY MEDICINE

## 2020-10-23 PROCEDURE — 97605 NEG PRS WND THER DME<=50SQCM: CPT | Performed by: FAMILY MEDICINE

## 2020-10-23 PROCEDURE — 99214 OFFICE O/P EST MOD 30 MIN: CPT | Mod: ,,, | Performed by: FAMILY MEDICINE

## 2020-10-23 NOTE — PROGRESS NOTES
[facility]                Progress Note    Subjective:       Patient ID: Catalino Mcfadden is a 55 y.o. male.    Chief Complaint: Non-healing Wound Follow Up and Diabetic Foot Ulcer    10/23/2020: F/U visit. Wound improving and measuring smaller. Neox with sutures in place. Only changed the outer dressing and continue same dressing. Discussed elevating as much as possible. Patient has no new pain nor any new concerns.  No fevers, chills, or sweats      Review of Systems   Constitutional: Negative.    HENT: Negative.    Eyes: Negative.    Respiratory: Negative.    Cardiovascular: Negative.    Gastrointestinal: Negative.    Skin: Positive for wound.   Neurological: Positive for numbness.   Psychiatric/Behavioral: Negative.          Objective:        Physical Exam  Constitutional:       Appearance: Normal appearance.   HENT:      Head: Normocephalic.      Nose: Nose normal.      Mouth/Throat:      Mouth: Mucous membranes are moist.      Pharynx: Oropharynx is clear.   Eyes:      Extraocular Movements: Extraocular movements intact.      Pupils: Pupils are equal, round, and reactive to light.   Neck:      Musculoskeletal: Normal range of motion and neck supple.   Cardiovascular:      Rate and Rhythm: Normal rate and regular rhythm.      Pulses: Normal pulses.      Heart sounds: Normal heart sounds.   Pulmonary:      Effort: Pulmonary effort is normal. No respiratory distress.      Breath sounds: No wheezing.   Abdominal:      General: There is no distension.      Palpations: Abdomen is soft.      Tenderness: There is no abdominal tenderness.   Skin:     General: Skin is warm and dry.      Comments: +diabetic ulcer to left foot with neox in place; no slough present   Neurological:      Mental Status: He is alert and oriented to person, place, and time.      Sensory: Sensory deficit present.         Vitals:    10/23/20 1112   BP: (!) 140/85   Pulse: 82   Resp: 18   Temp: 97.2 °F  (36.2 °C)       Assessment:           ICD-10-CM ICD-9-CM   1. 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  E11.621 250.80    L97.526 707.15            Negative Pressure Wound Therapy  08/21/20 1007 Left lateral (Active)   08/21/20 1007   Side: Left   Orientation: lateral   Location: Foot   Additional Comments:    Location:    SDO Location:    NPWT Type Vacuum Therapy 10/23/20 1100   Therapy Setting NPWT Continuous therapy 10/23/20 1100   Pressure Setting NPWT 125 mmHg 10/23/20 1100   Therapy Interventions NPWT Canister changed;Dressing changed;Seal intact 10/23/20 1100   Sponges Inserted NPWT Black;1 10/23/20 1100   Sponges Removed NPWT Black;1 10/23/20 1100   General Output (mL) 10 10/23/20 1100            Incision/Site 07/17/20 1251 Left Foot (Active)   07/17/20 1251   Present Prior to Hospital Arrival?:    Side: Left   Location: Foot   Orientation:    Incision Type:    Closure Method: Other (see comments)   Additional Comments:  open wound .betadine soak in 4x4's, abd, ace, castpadding, surgical boot   Removal Indication and Assessment:    Wound Outcome:    Removal Indications:    Wound Image   10/23/20 1100   Incision WDL ex 10/23/20 1100   Dressing Appearance Intact;Moist drainage 10/23/20 1100   Drainage Amount Moderate 10/23/20 1100   Drainage Characteristics/Odor Serosanguineous 10/23/20 1100   Appearance Red;White;Sutures intact 10/23/20 1100   Black (%), Wound Tissue Color 0 % 10/23/20 1100   Red (%), Wound Tissue Color 100 % 10/23/20 1100   Yellow (%), Wound Tissue Color 0 % 10/23/20 1100   Periwound Area Macerated 10/23/20 1100   Wound Edges Defined 10/23/20 1100   Wound Length (cm) 2.4 cm 10/23/20 1100   Wound Width (cm) 2.7 cm 10/23/20 1100   Wound Depth (cm) 0.25 cm 10/23/20 1100   Wound Volume (cm^3) 1.62 cm^3 10/23/20 1100   Wound Surface Area (cm^2) 6.48 cm^2 10/23/20 1100   Tunneling (depth (cm)/location) 0 10/23/20 1100    Undermining (depth (cm)/location) 0 10/23/20 1100   Care Soap and water;Sterile normal saline;Wound cleanser 10/23/20 1100   Dressing Applied;Other (see comments);Foam 10/23/20 1100   Periwound Care Other (see comments) 10/23/20 1100   Compression Tubular elasticized bandage 10/23/20 1100   Off Loading Football dressing 10/23/20 1100   Dressing Change Due 10/27/20 10/23/20 1100           Plan:                Orders Placed This Encounter   Procedures    Change dressing     Benzoin/cavilon, aquacel foam border to anterior ankle, adaptic touch with steri strips, [place] (black:1), jaleel foam long X2 perpendicular, football dressing (cast padding X3), single layer medium gradient tubigrip size E.        Follow up in about 4 days (around 10/27/2020) for wound care.     Marty Carvajal MD

## 2020-10-26 ENCOUNTER — OFFICE VISIT (OUTPATIENT)
Dept: FAMILY MEDICINE | Facility: CLINIC | Age: 55
End: 2020-10-26
Payer: COMMERCIAL

## 2020-10-26 VITALS
BODY MASS INDEX: 35.82 KG/M2 | RESPIRATION RATE: 17 BRPM | TEMPERATURE: 99 F | HEART RATE: 88 BPM | SYSTOLIC BLOOD PRESSURE: 138 MMHG | HEIGHT: 73 IN | DIASTOLIC BLOOD PRESSURE: 80 MMHG | WEIGHT: 270.31 LBS | OXYGEN SATURATION: 97 %

## 2020-10-26 DIAGNOSIS — N18.4 TYPE 2 DIABETES MELLITUS WITH STAGE 4 CHRONIC KIDNEY DISEASE, WITH LONG-TERM CURRENT USE OF INSULIN: ICD-10-CM

## 2020-10-26 DIAGNOSIS — E78.5 HYPERLIPIDEMIA, ACQUIRED: Chronic | ICD-10-CM

## 2020-10-26 DIAGNOSIS — R74.8 ELEVATED ALKALINE PHOSPHATASE LEVEL: ICD-10-CM

## 2020-10-26 DIAGNOSIS — E11.22 TYPE 2 DIABETES MELLITUS WITH STAGE 4 CHRONIC KIDNEY DISEASE, WITH LONG-TERM CURRENT USE OF INSULIN: ICD-10-CM

## 2020-10-26 DIAGNOSIS — Z79.4 TYPE 2 DIABETES MELLITUS WITH STAGE 4 CHRONIC KIDNEY DISEASE, WITH LONG-TERM CURRENT USE OF INSULIN: ICD-10-CM

## 2020-10-26 DIAGNOSIS — L97.426 DIABETIC ULCER OF LEFT MIDFOOT ASSOCIATED WITH TYPE 2 DIABETES MELLITUS, WITH BONE INVOLVEMENT WITHOUT EVIDENCE OF NECROSIS: ICD-10-CM

## 2020-10-26 DIAGNOSIS — E11.621 DIABETIC ULCER OF LEFT MIDFOOT ASSOCIATED WITH TYPE 2 DIABETES MELLITUS, WITH BONE INVOLVEMENT WITHOUT EVIDENCE OF NECROSIS: ICD-10-CM

## 2020-10-26 DIAGNOSIS — I10 ESSENTIAL HYPERTENSION: Primary | Chronic | ICD-10-CM

## 2020-10-26 PROCEDURE — 3075F SYST BP GE 130 - 139MM HG: CPT | Mod: CPTII,S$GLB,, | Performed by: FAMILY MEDICINE

## 2020-10-26 PROCEDURE — 3044F PR MOST RECENT HEMOGLOBIN A1C LEVEL <7.0%: ICD-10-PCS | Mod: CPTII,S$GLB,, | Performed by: FAMILY MEDICINE

## 2020-10-26 PROCEDURE — 99214 PR OFFICE/OUTPT VISIT, EST, LEVL IV, 30-39 MIN: ICD-10-PCS | Mod: S$GLB,,, | Performed by: FAMILY MEDICINE

## 2020-10-26 PROCEDURE — 3079F DIAST BP 80-89 MM HG: CPT | Mod: CPTII,S$GLB,, | Performed by: FAMILY MEDICINE

## 2020-10-26 PROCEDURE — 3079F PR MOST RECENT DIASTOLIC BLOOD PRESSURE 80-89 MM HG: ICD-10-PCS | Mod: CPTII,S$GLB,, | Performed by: FAMILY MEDICINE

## 2020-10-26 PROCEDURE — 3008F BODY MASS INDEX DOCD: CPT | Mod: CPTII,S$GLB,, | Performed by: FAMILY MEDICINE

## 2020-10-26 PROCEDURE — 3008F PR BODY MASS INDEX (BMI) DOCUMENTED: ICD-10-PCS | Mod: CPTII,S$GLB,, | Performed by: FAMILY MEDICINE

## 2020-10-26 PROCEDURE — 99999 PR PBB SHADOW E&M-EST. PATIENT-LVL III: CPT | Mod: PBBFAC,,, | Performed by: FAMILY MEDICINE

## 2020-10-26 PROCEDURE — 99999 PR PBB SHADOW E&M-EST. PATIENT-LVL III: ICD-10-PCS | Mod: PBBFAC,,, | Performed by: FAMILY MEDICINE

## 2020-10-26 PROCEDURE — 3075F PR MOST RECENT SYSTOLIC BLOOD PRESS GE 130-139MM HG: ICD-10-PCS | Mod: CPTII,S$GLB,, | Performed by: FAMILY MEDICINE

## 2020-10-26 PROCEDURE — 99214 OFFICE O/P EST MOD 30 MIN: CPT | Mod: S$GLB,,, | Performed by: FAMILY MEDICINE

## 2020-10-26 PROCEDURE — 3044F HG A1C LEVEL LT 7.0%: CPT | Mod: CPTII,S$GLB,, | Performed by: FAMILY MEDICINE

## 2020-10-26 RX ORDER — ATORVASTATIN CALCIUM 40 MG/1
40 TABLET, FILM COATED ORAL DAILY
Qty: 90 TABLET | Refills: 1 | Status: SHIPPED | OUTPATIENT
Start: 2020-10-26 | End: 2021-05-25 | Stop reason: SDUPTHER

## 2020-10-26 RX ORDER — DULAGLUTIDE 0.75 MG/.5ML
INJECTION, SOLUTION SUBCUTANEOUS
Qty: 2 ML | Refills: 5 | Status: SHIPPED | OUTPATIENT
Start: 2020-10-26 | End: 2021-04-28 | Stop reason: SDUPTHER

## 2020-10-26 NOTE — PROGRESS NOTES
Chief Complaint   Patient presents with    Annual Exam       Catalino Mcfadden is a 55 y.o. male who presents per the Chief Complaint.  Pt is known to me and was last seen by me on 10/21/2020.  All known chronic medical issues have been documented.    Hypertension  This is a chronic problem. The current episode started more than 1 year ago. The problem is unchanged. The problem is controlled. Associated symptoms include neck pain (improved) and shortness of breath (resolved). Pertinent negatives include no anxiety, blurred vision, chest pain, headaches, palpitations, peripheral edema or sweats. There are no associated agents to hypertension. Risk factors for coronary artery disease include diabetes mellitus, dyslipidemia, male gender and obesity. Past treatments include beta blockers, calcium channel blockers, diuretics and ACE inhibitors. The current treatment provides moderate improvement. There are no compliance problems.  Hypertensive end-organ damage includes kidney disease and retinopathy. There is no history of angina, CAD/MI, CVA, heart failure, left ventricular hypertrophy or PVD. Identifiable causes of hypertension include chronic renal disease. There is no history of coarctation of the aorta, hyperaldosteronism, hypercortisolism, hyperparathyroidism, a hypertension causing med, pheochromocytoma, renovascular disease, sleep apnea or a thyroid problem.   Diabetes  He presents for his follow-up diabetic visit. He has type 2 diabetes mellitus. No MedicAlert identification noted. The initial diagnosis of diabetes was made 20 years ago. His disease course has been improving. Pertinent negatives for hypoglycemia include no confusion, dizziness, headaches, hunger, mood changes, nervousness/anxiousness, pallor, seizures, sleepiness, speech difficulty, sweats or tremors. Associated symptoms include foot paresthesias. Pertinent negatives for diabetes include no blurred vision, no chest pain, no fatigue, no foot  ulcerations, no polydipsia, no polyphagia, no polyuria, no visual change, no weakness and no weight loss. There are no hypoglycemic complications. Pertinent negatives for hypoglycemia complications include no blackouts, no hospitalization, no nocturnal hypoglycemia, no required assistance and no required glucagon injection. Symptoms are improving. Diabetic complications include nephropathy, peripheral neuropathy and retinopathy. Pertinent negatives for diabetic complications include no autonomic neuropathy, CVA, heart disease or PVD. Risk factors for coronary artery disease include hypertension, obesity, sedentary lifestyle and diabetes mellitus. Current diabetic treatment includes diet and oral agent (dual therapy). He is compliant with treatment all of the time. His weight is decreasing steadily. He is following a generally healthy, low fat/cholesterol and low salt diet. Meal planning includes avoidance of concentrated sweets. He has not had a previous visit with a dietitian. He participates in exercise intermittently. He monitors blood glucose at home 1-2 x per day. He monitors urine at home <1 x per month. Blood glucose monitoring compliance is fair. His home blood glucose trend is decreasing steadily. An ACE inhibitor/angiotensin II receptor blocker is being taken. He sees a podiatrist.Eye exam is not current.       ROS  Review of Systems   Constitutional: Negative.  Negative for activity change, appetite change, chills, diaphoresis, fatigue, fever, unexpected weight change and weight loss.   HENT: Negative.  Negative for congestion, ear pain, hearing loss, nosebleeds, postnasal drip, rhinorrhea, sinus pressure, sneezing, sore throat and trouble swallowing.    Eyes: Negative for blurred vision, pain, discharge and visual disturbance.   Respiratory: Positive for shortness of breath (resolved). Negative for cough, choking, chest tightness and wheezing.    Cardiovascular: Negative for chest pain, palpitations and  "leg swelling.   Gastrointestinal: Negative for abdominal pain, blood in stool, constipation, diarrhea, nausea and vomiting.   Endocrine: Negative for polydipsia, polyphagia and polyuria.   Genitourinary: Negative for difficulty urinating, dysuria, flank pain, frequency, hematuria, penile pain, penile swelling, scrotal swelling, testicular pain and urgency.   Musculoskeletal: Positive for gait problem and neck pain (improved). Negative for arthralgias, back pain, joint swelling, myalgias and neck stiffness.   Skin: Positive for wound (healing; wound vac in place). Negative for color change, pallor and rash.   Allergic/Immunologic: Negative for environmental allergies, food allergies and immunocompromised state.   Neurological: Negative for dizziness, tremors, seizures, syncope, speech difficulty, weakness, light-headedness and headaches.   Psychiatric/Behavioral: Negative.  Negative for confusion, decreased concentration, dysphoric mood and sleep disturbance. The patient is not nervous/anxious.        Physical Exam  Vitals:    10/26/20 1558   BP: 138/80   Pulse: 88   Resp: 17   Temp: 98.8 °F (37.1 °C)    Body mass index is 35.66 kg/m².  Weight: 122.6 kg (270 lb 4.5 oz)   Height: 6' 1" (185.4 cm)     Physical Exam  Constitutional:       General: He is not in acute distress.     Appearance: Normal appearance. He is well-developed. He is not ill-appearing, toxic-appearing or diaphoretic.   HENT:      Head: Normocephalic and atraumatic.        Right Ear: Hearing and external ear normal. No decreased hearing noted.      Left Ear: Hearing and external ear normal. No decreased hearing noted.      Nose: Nose normal. No nasal deformity or rhinorrhea.      Mouth/Throat:      Dentition: Normal dentition. Does not have dentures.      Pharynx: Uvula midline.   Eyes:      General: Lids are normal. No scleral icterus.        Right eye: No foreign body or discharge.         Left eye: No foreign body or discharge.      " Conjunctiva/sclera: Conjunctivae normal.      Right eye: No chemosis or exudate.     Left eye: No chemosis or exudate.     Pupils: Pupils are equal, round, and reactive to light.   Neck:      Musculoskeletal: Full passive range of motion without pain, normal range of motion and neck supple.   Cardiovascular:      Rate and Rhythm: Normal rate and regular rhythm.      Heart sounds: Normal heart sounds, S1 normal and S2 normal. No murmur. No friction rub. No gallop.    Pulmonary:      Effort: Pulmonary effort is normal. No accessory muscle usage or respiratory distress.      Breath sounds: Normal breath sounds. No decreased breath sounds, wheezing, rhonchi or rales.   Abdominal:      General: There is no distension.      Palpations: Abdomen is soft. Abdomen is not rigid.      Tenderness: There is no abdominal tenderness. There is no guarding or rebound.   Musculoskeletal:      Left ankle: He exhibits decreased range of motion.      Cervical back: He exhibits tenderness and pain. He exhibits normal range of motion, no bony tenderness, no swelling, no edema, no deformity, no laceration, no spasm and normal pulse.        Feet:       Comments: Left foot in surgical boot   Skin:     General: Skin is warm and dry.      Findings: No rash.   Neurological:      Mental Status: He is alert and oriented to person, place, and time.      Cranial Nerves: No cranial nerve deficit.      Sensory: No sensory deficit.      Motor: No abnormal muscle tone or seizure activity.      Coordination: Coordination normal.      Gait: Gait normal.   Psychiatric:         Attention and Perception: He is attentive.         Speech: Speech normal.         Behavior: Behavior normal. Behavior is cooperative.         Thought Content: Thought content normal.         Judgment: Judgment normal.         Assessment & Plan    Discussion of plan of care including treatment options regarding health and wellness were reviewed and discussed with patient.  Any changes  to medication or treatment plan, as well as any screening blood test, imaging, or referrals to specialist, are documented.  Follow up as indicated.     1. Essential hypertension  Patient was counseled and encouraged to maintain a low sodium diet, as well as increasing physical activity.  Recommend random BP checks at home on a regular basis.  Repeat BP at end of visit was not necessary. Will continue medication at this time, and follow up in 3-6 months, or sooner if blood pressure begins to increase.       2. Type 2 diabetes mellitus with stage 4 chronic kidney disease, with long-term current use of insulin  Patient is encouraged to follow a diet low in carbohydrates and simple sugars.  Discussed simple vs. complex carbohydrates as well as eating times of certain meals. Advised to focus on good food choices and increased physical activity and encouraged to adhere to medication regimen and/or lifestyle adjustments, and to check glucose level as recommended.  Contact office if glucose levels are not improving over time.  Will monitor HbA1c appropriately.   - dulaglutide (TRULICITY) 0.75 mg/0.5 mL pen injector; INJECT 0.5 ML UNDER THE SKIN EVERY 7 DAYS  Dispense: 2 mL; Refill: 5    3. Diabetic ulcer of left midfoot associated with type 2 diabetes mellitus, with bone involvement without evidence of necrosis  Stable; wound managed and is healing appropriately.    4. Hyperlipidemia, acquired  We discussed ways to manage cholesterol levels, including increasing whole grain foods and decreasing fried and fatty foods.  I also recommended OTC Omega 3 and Omega 6 supplements to improve overall cholesterol levels.  Will continue current therapy at this visit and will monitor lipids appropriately.  Advised to restart statin therapy.  - atorvastatin (LIPITOR) 40 MG tablet; Take 1 tablet (40 mg total) by mouth once daily.  Dispense: 90 tablet; Refill: 1    5. Elevated alkaline phosphatase level  Will order imaging to further  evaluate and manage accordingly.    - US Abdomen Limited; Future       Follow up in about 3 months (around 1/26/2021).      ACTIVE MEDICAL ISSUES:  Documented in Problem List    PAST MEDICAL HISTORY  Documented  .  PAST SURGICAL HISTORY:  Documented    SOCIAL HISTORY:  Documented    FAMILY HISTORY:  Documented    ALLERGIES AND MEDICATIONS: updated and reviewed.  Documented    Health Maintenance       Date Due Completion Date    HIV Screening 04/04/1979 ---    TETANUS VACCINE 04/04/1982 ---    Shingles Vaccine (1 of 2) 04/04/2014 ---    Eye Exam 04/03/2019 4/3/2018    Colorectal Cancer Screening 06/04/2019 6/3/2019    Override on 9/26/2014: Done (future)    Hemoglobin A1c 01/21/2021 10/21/2020    Foot Exam 08/04/2021 8/4/2020    Override on 5/25/2020: Done    Override on 4/27/2020: Done    Override on 4/20/2020: Done    Low Dose Statin 10/21/2021 10/21/2020    Lipid Panel 10/21/2021 10/21/2020

## 2020-10-27 ENCOUNTER — HOSPITAL ENCOUNTER (OUTPATIENT)
Dept: WOUND CARE | Facility: HOSPITAL | Age: 55
Discharge: HOME | End: 2020-10-27
Attending: FAMILY MEDICINE
Payer: COMMERCIAL

## 2020-10-27 VITALS — SYSTOLIC BLOOD PRESSURE: 164 MMHG | TEMPERATURE: 98 F | HEART RATE: 91 BPM | DIASTOLIC BLOOD PRESSURE: 79 MMHG

## 2020-10-27 DIAGNOSIS — L97.526 DIABETIC ULCER OF LEFT FOOT ASSOCIATED WITH TYPE 2 DIABETES MELLITUS, WITH BONE INVOLVEMENT WITHOUT EVIDENCE OF NECROSIS: Primary | ICD-10-CM

## 2020-10-27 DIAGNOSIS — E11.621 DIABETIC ULCER OF LEFT FOOT ASSOCIATED WITH TYPE 2 DIABETES MELLITUS, WITH BONE INVOLVEMENT WITHOUT EVIDENCE OF NECROSIS: Primary | ICD-10-CM

## 2020-10-27 PROCEDURE — 97605 NEG PRS WND THER DME<=50SQCM: CPT

## 2020-10-27 NOTE — PROGRESS NOTES
[facility]  [place]  [id number]  Nurse Visit    Subjective:       Patient seen in clinic today.  Dressing changed as ordered. Left Lateral Diabetic Foot Ulcer with sutures intact. Cannot see edges of Neox as it appears to have incorporated. Replaced adaptic touch (which peeled away with removal of vac sponge. Suction intact at 125 mmHg low continuous. Covered with mextra/marycarmen foam/cast padding/tubigrip. Patient to follow up with NERI Martinez this Friday 10/30/20. Patient states that he is still elevating as instructed and has no new issues to report.       Assessment:          Negative Pressure Wound Therapy  08/21/20 1007 Left lateral (Active)   08/21/20 1007   Side: Left   Orientation: lateral   Location: Foot   Additional Comments:    Location:    SDO Location:    NPWT Type Vacuum Therapy 10/27/20 1500   Therapy Setting NPWT Continuous therapy 10/27/20 1500   Pressure Setting NPWT 125 mmHg 10/27/20 1500   Sponges Inserted NPWT Black;1 10/27/20 1500   Sponges Removed NPWT Black;1 10/27/20 1500   General Output (mL) 30 10/27/20 1500            Incision/Site 07/17/20 1251 Left Foot (Active)   07/17/20 1251   Present Prior to Hospital Arrival?:    Side: Left   Location: Foot   Orientation:    Incision Type:    Closure Method: Other (see comments)   Additional Comments:  open wound .betadine soak in 4x4's, abd, ace, castpadding, surgical boot   Removal Indication and Assessment:    Wound Outcome:    Removal Indications:    Incision WDL ex 10/27/20 1500   Dressing Appearance Moist drainage 10/27/20 1500   Drainage Amount Small 10/27/20 1500   Drainage Characteristics/Odor Serous 10/27/20 1500   Appearance Red;Granulating;Sutures intact 10/27/20 1500   Red (%), Wound Tissue Color 100 % 10/27/20 1500   Periwound Area Macerated 10/27/20 1500   Wound Edges Open 10/27/20 1500   Care Cleansed with:;Sterile normal saline;Soap and water 10/27/20 1500   Dressing  Changed;Non-adherent;Foam;Cast padding;Tubular bandage 10/27/20 1500   Periwound Care Moisture barrier applied;Skin barrier film applied 10/27/20 1500   Compression Tubular elasticized bandage 10/27/20 1500   Off Loading Football dressing 10/27/20 1500           Plan:     No orders of the defined types were placed in this encounter.          Follow up in about 3 days (around 10/30/2020) for wound care.

## 2020-10-29 ENCOUNTER — TELEPHONE (OUTPATIENT)
Dept: WOUND CARE | Facility: HOSPITAL | Age: 55
End: 2020-10-29

## 2020-10-29 NOTE — TELEPHONE ENCOUNTER
"Patient describing issues with vac machine "blockage" alarm that has been going off. Ok'd patient to come by informally for me to troubleshoot. Dressings were clean, dry, and intact from nurse visit on Tuesday. Found that cannister did have a blockage despite the fact that it had barely any drainage within cannister - so maybe a defect in make of cannister itself. Cannister and suction head both replaced. Suction resumed intact at 125 mmHG low continuous. Patient to follow up with regular appt with NERI Martinez tomorrow.   "

## 2020-10-30 ENCOUNTER — HOSPITAL ENCOUNTER (OUTPATIENT)
Dept: WOUND CARE | Facility: HOSPITAL | Age: 55
Discharge: HOME | End: 2020-10-30
Attending: PODIATRIST
Payer: COMMERCIAL

## 2020-10-30 VITALS
DIASTOLIC BLOOD PRESSURE: 69 MMHG | TEMPERATURE: 97 F | RESPIRATION RATE: 18 BRPM | HEART RATE: 83 BPM | SYSTOLIC BLOOD PRESSURE: 123 MMHG

## 2020-10-30 DIAGNOSIS — L97.526 DIABETIC ULCER OF LEFT FOOT ASSOCIATED WITH TYPE 2 DIABETES MELLITUS, WITH BONE INVOLVEMENT WITHOUT EVIDENCE OF NECROSIS: Primary | ICD-10-CM

## 2020-10-30 DIAGNOSIS — E11.621 DIABETIC ULCER OF LEFT FOOT ASSOCIATED WITH TYPE 2 DIABETES MELLITUS, WITH BONE INVOLVEMENT WITHOUT EVIDENCE OF NECROSIS: Primary | ICD-10-CM

## 2020-10-30 PROCEDURE — 15271 SKIN SUB GRAFT TRNK/ARM/LEG: CPT | Performed by: PODIATRIST

## 2020-10-30 PROCEDURE — 15275 PR SKIN SUB GRAFT FACE/NK/HF/G UP TO 100 SQCM: ICD-10-PCS | Mod: ,,, | Performed by: PODIATRIST

## 2020-10-30 PROCEDURE — 15275 SKIN SUB GRAFT FACE/NK/HF/G: CPT | Mod: ,,, | Performed by: PODIATRIST

## 2020-10-30 NOTE — PROGRESS NOTES
[facility]                Progress Note    Subjective:       Patient ID: Catalino Mcfadden is a 55 y.o. male.    Chief Complaint: Non-healing Wound Follow Up and Diabetic Foot Ulcer    10/30/2020: F/U visit. No fever. No pain. Wound is improving. Malodorous. Dakins flushed.Removed 5 sutures. neox (graft) applied with 6 sutures. Football wrap applied due to power outages, pt has no power to charge wound vac. Will apply wound vac during the nurse visit. Discussed keeping the dressing clean/ dry and to elevate as much as possible.       Review of Systems   Constitutional: Negative for appetite change, chills and fever.   Respiratory: Negative for cough, shortness of breath and wheezing.    Cardiovascular: Positive for leg swelling. Negative for chest pain.   Gastrointestinal: Negative for diarrhea, nausea and vomiting.   Musculoskeletal: Positive for arthralgias, joint swelling and myalgias.   Skin: Positive for wound.   Neurological: Positive for numbness and headaches. Negative for weakness.        + paresthesia          Objective:        Physical Exam  Vitals signs and nursing note reviewed.   Constitutional:       General: He is not in acute distress.     Appearance: He is not toxic-appearing or diaphoretic.      Comments: Pt. is well-developed, well-nourished, appears stated age, in no acute distress, alert and oriented x 3. No evidence of depression, anxiety, or agitation. Calm, cooperative, and communicative. Appropriate interactions and affect.   Cardiovascular:      Pulses:           Dorsalis pedis pulses are 2+ on the right side and 2+ on the left side.        Posterior tibial pulses are 1+ on the right side and 1+ on the left side.      Comments: There is decreased digital hair. Skin is atrophic, slightly hyperpigmented  Pulmonary:      Effort: No respiratory distress.   Musculoskeletal:      Right ankle: He exhibits normal range of motion and no swelling. No  tenderness. No lateral malleolus, no medial malleolus, no AITFL, no CF ligament and no posterior TFL tenderness found. Achilles tendon exhibits no pain, no defect and normal Hilliard's test results.      Left ankle: He exhibits normal range of motion and no swelling. No tenderness. No lateral malleolus, no medial malleolus, no AITFL, no CF ligament and no posterior TFL tenderness found. Achilles tendon exhibits no pain, no defect and normal Hilliard's test results.      Right foot: No tenderness or bony tenderness.      Left foot: No tenderness or bony tenderness.      Comments: Decreased stride, station of gait.  apropulsive toe off.  Increased angle and base of gait.    There is equinus deformity bilateral with decreased dorsiflexion at the ankle joint bilateral. No tenderness with compression of heel. Negative tinels sign. Gait analysis reveals excessive pronation through midstance and propulsion with early heel off.     Patient has hammertoes of digits 2-5 bilateral partially reducible     Decreased first MPJ range of motion both weightbearing and nonweightbearing, no crepitus observed the first MP joint, + dorsal flag sign.     Visible and palpable bunion without pain at dorsomedial 1st metatarsal head right and left.  Hallux abducted right and left partially reducible, tracks laterally without being track bound.  No ecchymosis, erythema, edema, or cardinal signs infection or signs of trauma same foot.    Fat pad atrophy to heels and met heads bilateral    Plantarflexed 1st ray RLE   Lymphadenopathy:      Comments: No lymphatic streaking    Negative lymphadenopathy bilateral popliteal fossa and tarsal tunnel.     Skin:     General: Skin is warm and dry.      Coloration: Skin is not pale.      Findings: Erythema and lesion (see wound description below) present. No abrasion, ecchymosis, laceration or rash.      Nails: There is no clubbing.        Comments: Toenails 1-5 bilaterally discolored/yellowed, dystrophic,  brittle with subungual debris.    Neurological:      Sensory: Sensory deficit present.      Comments:  [place] 5.07 monofilamant testing is diminished Kp feet. Decreased/absent vibratory sensation bilateral feet to 128Hz tuning fork.     Paresthesias, and hyperesthesia bilateral feet with no clearly identified trigger or source.           Psychiatric:         Attention and Perception: He is attentive.         Mood and Affect: Mood is not anxious. Affect is not inappropriate.         Speech: He is communicative. Speech is not slurred.         Behavior: Behavior is not combative.         Vitals:    10/30/20 0930   BP: 123/69   Pulse: 83   Resp: 18   Temp: 97.2 °F (36.2 °C)       Assessment:           ICD-10-CM ICD-9-CM   1. 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  E11.621 250.80    L97.526 707.15            Negative Pressure Wound Therapy  08/21/20 1007 Left lateral (Active)   08/21/20 1007   Side: Left   Orientation: lateral   Location: Foot   Additional Comments:    Location:    SDO Location:    NPWT Type Vacuum Therapy 10/30/20 1800   Therapy Setting NPWT Continuous therapy 10/30/20 1800   Pressure Setting NPWT 125 mmHg 10/30/20 1800   Therapy Interventions NPWT Seal intact;Dressing changed 10/30/20 1800   Sponges Inserted NPWT Black;1 10/30/20 1800   Sponges Removed NPWT Black;1 10/30/20 1800   General Output (mL) 25 10/30/20 1800            Incision/Site 07/17/20 1251 Left Foot (Active)   07/17/20 1251   Present Prior to Hospital Arrival?:    Side: Left   Location: Foot   Orientation:    Incision Type:    Closure Method: Other (see comments)   Additional Comments:  open wound .betadine soak in 4x4's, abd, ace, castpadding, surgical boot   Removal Indication and Assessment:    Wound Outcome:    Removal Indications:    Wound Image   10/30/20 1800   Incision WDL ex 10/30/20 1800   Dressing Appearance Intact;Moist drainage  10/30/20 1800   Drainage Amount Moderate 10/30/20 1800   Drainage Characteristics/Odor Serosanguineous 10/30/20 1800   Appearance Red;Yellow;White 10/30/20 1800   Black (%), Wound Tissue Color 0 % 10/30/20 1800   Red (%), Wound Tissue Color 95 % 10/30/20 1800   Yellow (%), Wound Tissue Color 5 % 10/30/20 1800   Periwound Area Macerated 10/30/20 1800   Wound Edges Defined 10/30/20 1800   Wound Length (cm) 2.4 cm 10/30/20 1800   Wound Width (cm) 2.3 cm 10/30/20 1800   Wound Depth (cm) 0.2 cm 10/30/20 1800   Wound Volume (cm^3) 1.1 cm^3 10/30/20 1800   Wound Surface Area (cm^2) 5.52 cm^2 10/30/20 1800   Tunneling (depth (cm)/location) 0 10/30/20 1800   Undermining (depth (cm)/location) 0 10/30/20 1800   Care Soap and water;Sterile normal saline;Wound cleanser 10/30/20 1800   Dressing Applied;Other (see comments) 10/30/20 1800   Periwound Care Other (see comments) 10/30/20 1800   Compression Tubular elasticized bandage 10/30/20 1800   Off Loading Football dressing 10/30/20 1800   Dressing Change Due 11/03/20 10/30/20 1800           Plan:              Wound is measuring smaller however bone noted to the distal lateral aspect of the wound bed. Debridement performed. Neox (graft) with sutures applied.  orders on hold due to graft. Pt will come for a nurse visit Tuesday. Discussed elevating as much as possible.      NEOX OP REPORT    SURGEON: Aviva Martinez DPM  ASSITANT: None  PRE-OP DX: Ulceration secondary to diabetes mellitus and peripheral neuropathy with sluggish response to prior treatment.  POST-OP DX: same.  ANESTHESIA: Pt. has loss of sensation and therefore no anesthesia was required.  HEMOSTASIS: pressure  EBL: less than 5cc.    Time Out performed to verify patient and site and consent obtained.    Procedure: The patient was seen in the wound clinic room and placed in supine position on the treatment table.  Attention was directed to the ulcer which was located on the left plantar lateral foot, where an  ulceration measuring  2.4 x 2.3 x 0.25 cm is noted.  The ulceration was had a  mild callused rim with periwound maceration.  No acute signs of infection were noted.  The wound is nontender.  Utilizing a 3 mm curette, I debrided 100% of the wound full-thickness through subcutaneous tissue to excise viable and nonviable tissue outside the wound margins.  Patient tolerated well.  The wound was flushed with sterile saline.  There was minimal bleeding with debridement which was well controlled with direct pressure.  A 3 x 3 cm NEOX sheet was then inspected and noted to be in acceptable.  It was then removed sterilely .  The sheet was then fenestrated with the scalpel and then cut and sized and shaped to the wound.  I utilized the entirety with overlapping edges against the wound.  The NEOX was adhered down with saline soaked cotton swabs and then secured in place 3-0 prolene and covered with adaptic touch non-adherent layer followed by football dressing in lieu of VAC due to power outtage in patient home.  The patient having tolerated the procedure well left the clinic with all vital signs stable and vascular status intact to all digits of both feet.     Short-term goals including promoting granulation and epithelialization of the wound. Long term goals include maintaining a healed wound with appropriate offloading and good medical management per internist.    Offloading: CAM boot      Orders Placed This Encounter   Procedures    Change dressing     Benzoin periwound, Neox applied, adaptic touch with steri strips, mextra short X1, jaleel foam, football dressing (cast padding X4 up leg), single layer medium gradient tubigripsize E. Nurse visit- apply wound vac        Follow up in about 4 days (around 11/3/2020) for wound care.

## 2020-11-03 ENCOUNTER — HOSPITAL ENCOUNTER (OUTPATIENT)
Dept: WOUND CARE | Facility: HOSPITAL | Age: 55
Discharge: HOME | End: 2020-11-03
Attending: FAMILY MEDICINE
Payer: COMMERCIAL

## 2020-11-03 VITALS
HEART RATE: 90 BPM | SYSTOLIC BLOOD PRESSURE: 166 MMHG | RESPIRATION RATE: 18 BRPM | TEMPERATURE: 98 F | DIASTOLIC BLOOD PRESSURE: 81 MMHG

## 2020-11-03 DIAGNOSIS — L97.526 DIABETIC ULCER OF LEFT FOOT ASSOCIATED WITH TYPE 2 DIABETES MELLITUS, WITH BONE INVOLVEMENT WITHOUT EVIDENCE OF NECROSIS: Primary | ICD-10-CM

## 2020-11-03 DIAGNOSIS — E11.621 DIABETIC ULCER OF LEFT FOOT ASSOCIATED WITH TYPE 2 DIABETES MELLITUS, WITH BONE INVOLVEMENT WITHOUT EVIDENCE OF NECROSIS: Primary | ICD-10-CM

## 2020-11-03 PROCEDURE — 97605 NEG PRS WND THER DME<=50SQCM: CPT

## 2020-11-03 NOTE — PROGRESS NOTES
[facility]  CONSTANZA Newell  [id number]  Nurse Visit    Subjective:       Patient seen in clinic today.  Dressing changed as ordered. Removed football wrap and applied wound vac (black:1) back on due to weather conditions. Malodorous. Discussed elevating bilateral legs.      Assessment:          Negative Pressure Wound Therapy  08/21/20 1007 Left lateral (Active)   08/21/20 1007   Side: Left   Orientation: lateral   Location: Foot   Additional Comments:    Location:    SDO Location:    NPWT Type Vacuum Therapy 11/03/20 1400   Therapy Setting NPWT Continuous therapy 11/03/20 1400   Pressure Setting NPWT 125 mmHg 11/03/20 1400   Therapy Interventions NPWT Dressing changed;Seal intact 11/03/20 1400   Sponges Inserted NPWT Black;1 11/03/20 1400            Incision/Site 07/17/20 1251 Left Foot (Active)   07/17/20 1251   Present Prior to Hospital Arrival?:    Side: Left   Location: Foot   Orientation:    Incision Type:    Closure Method: Other (see comments)   Additional Comments:  open wound .betadine soak in 4x4's, abd, ace, castpadding, surgical boot   Removal Indication and Assessment:    Wound Outcome:    Removal Indications:    Incision WDL ex 11/03/20 1400   Dressing Appearance Intact;Moist drainage 11/03/20 1400   Drainage Amount Moderate 11/03/20 1400   Drainage Characteristics/Odor Serosanguineous;Malodorous 11/03/20 1400   Appearance Sutures intact;Steri-strips intact;Other (see comments) 11/03/20 1400   Black (%), Wound Tissue Color 0 % 11/03/20 1400   Yellow (%), Wound Tissue Color 0 % 11/03/20 1400   Periwound Area Macerated 11/03/20 1400   Tunneling (depth (cm)/location) 0 11/03/20 1400   Undermining (depth (cm)/location) 0 11/03/20 1400   Care Soap and water;Sterile normal saline;Wound cleanser 11/03/20 1400   Dressing Applied;Other (see comments);Foam 11/03/20 1400   Periwound Care Other (see comments) 11/03/20 1400   Compression Tubular elasticized bandage 11/03/20  1400   Off Loading Football dressing 11/03/20 1400   Dressing Change Due 11/06/20 11/03/20 1400           Plan:     No orders of the defined types were placed in this encounter.          No follow-ups on file.

## 2020-11-05 ENCOUNTER — TELEPHONE (OUTPATIENT)
Dept: WOUND CARE | Facility: HOSPITAL | Age: 55
End: 2020-11-05

## 2020-11-05 DIAGNOSIS — E11.621 DIABETIC ULCER OF LEFT FOOT ASSOCIATED WITH TYPE 2 DIABETES MELLITUS, WITH BONE INVOLVEMENT WITHOUT EVIDENCE OF NECROSIS: Primary | ICD-10-CM

## 2020-11-05 DIAGNOSIS — L97.526 DIABETIC ULCER OF LEFT FOOT ASSOCIATED WITH TYPE 2 DIABETES MELLITUS, WITH BONE INVOLVEMENT WITHOUT EVIDENCE OF NECROSIS: Primary | ICD-10-CM

## 2020-11-06 ENCOUNTER — HOSPITAL ENCOUNTER (OUTPATIENT)
Dept: WOUND CARE | Facility: HOSPITAL | Age: 55
Discharge: HOME | End: 2020-11-06
Attending: PODIATRIST
Payer: COMMERCIAL

## 2020-11-06 VITALS — TEMPERATURE: 98 F | SYSTOLIC BLOOD PRESSURE: 169 MMHG | DIASTOLIC BLOOD PRESSURE: 85 MMHG | HEART RATE: 99 BPM

## 2020-11-06 DIAGNOSIS — L97.526 DIABETIC ULCER OF LEFT FOOT ASSOCIATED WITH TYPE 2 DIABETES MELLITUS, WITH BONE INVOLVEMENT WITHOUT EVIDENCE OF NECROSIS: Primary | ICD-10-CM

## 2020-11-06 DIAGNOSIS — E11.621 DIABETIC ULCER OF LEFT FOOT ASSOCIATED WITH TYPE 2 DIABETES MELLITUS, WITH BONE INVOLVEMENT WITHOUT EVIDENCE OF NECROSIS: Primary | ICD-10-CM

## 2020-11-06 PROCEDURE — 99213 PR OFFICE/OUTPT VISIT, EST, LEVL III, 20-29 MIN: ICD-10-PCS | Mod: ,,, | Performed by: PODIATRIST

## 2020-11-06 PROCEDURE — 97605 NEG PRS WND THER DME<=50SQCM: CPT | Performed by: PODIATRIST

## 2020-11-06 PROCEDURE — 99213 OFFICE O/P EST LOW 20 MIN: CPT | Mod: ,,, | Performed by: PODIATRIST

## 2020-11-06 NOTE — PROGRESS NOTES
[facility]                Progress Note    Subjective:       Patient ID: Catalino Mcfadden is a 55 y.o. male.    Chief Complaint: No chief complaint on file.    Patient ambulated to exam bed without assistance. No complaints of pain. Sutures remain in place to wound. Neox placed last week. Skipping reapplication this week with plans to reapply next week. Patient continuing to stay at home and offload. No changes in lifestyle or diet. Note some maceration periwound (likely started when patient switched from vac to traditional dressing during electrical power loss in house). NPWT has been in place again since Tuesday 11/3/20. Reapplied vac in clinic today. Left Lateral Foot Incision stable at this time.         Review of Systems   Constitutional: Negative for appetite change, chills and fever.   Respiratory: Negative for cough, shortness of breath and wheezing.    Cardiovascular: Positive for leg swelling. Negative for chest pain.   Gastrointestinal: Negative for diarrhea, nausea and vomiting.   Musculoskeletal: Positive for arthralgias, joint swelling and myalgias.   Skin: Positive for wound.   Neurological: Positive for numbness and headaches. Negative for weakness.        + paresthesia          Objective:        Physical Exam  Vitals signs and nursing note reviewed.   Constitutional:       General: He is not in acute distress.     Appearance: He is not toxic-appearing or diaphoretic.      Comments: Pt. is well-developed, well-nourished, appears stated age, in no acute distress, alert and oriented x 3. No evidence of depression, anxiety, or agitation. Calm, cooperative, and communicative. Appropriate interactions and affect.   Cardiovascular:      Pulses:           Dorsalis pedis pulses are 2+ on the right side and 2+ on the left side.        Posterior tibial pulses are 1+ on the right side and 1+ on the left side.      Comments: There is decreased digital hair. Skin is  atrophic, slightly hyperpigmented  Pulmonary:      Effort: No respiratory distress.   Musculoskeletal:      Right ankle: He exhibits normal range of motion and no swelling. No tenderness. No lateral malleolus, no medial malleolus, no AITFL, no CF ligament and no posterior TFL tenderness found. Achilles tendon exhibits no pain, no defect and normal Hilliard's test results.      Left ankle: He exhibits normal range of motion and no swelling. No tenderness. No lateral malleolus, no medial malleolus, no AITFL, no CF ligament and no posterior TFL tenderness found. Achilles tendon exhibits no pain, no defect and normal Hilliard's test results.      Right foot: No tenderness or bony tenderness.      Left foot: No tenderness or bony tenderness.      Comments: Decreased stride, station of gait.  apropulsive toe off.  Increased angle and base of gait.    There is equinus deformity bilateral with decreased dorsiflexion at the ankle joint bilateral. No tenderness with compression of heel. Negative tinels sign. Gait analysis reveals excessive pronation through midstance and propulsion with early heel off.     Patient has hammertoes of digits 2-5 bilateral partially reducible     Decreased first MPJ range of motion both weightbearing and nonweightbearing, no crepitus observed the first MP joint, + dorsal flag sign.     Visible and palpable bunion without pain at dorsomedial 1st metatarsal head right and left.  Hallux abducted right and left partially reducible, tracks laterally without being track bound.  No ecchymosis, erythema, edema, or cardinal signs infection or signs of trauma same foot.    Fat pad atrophy to heels and met heads bilateral    Plantarflexed 1st ray RLE   Lymphadenopathy:      Comments: No lymphatic streaking    Negative lymphadenopathy bilateral popliteal fossa and tarsal tunnel.     Skin:     General: Skin is warm and dry.      Coloration: Skin is not pale.      Findings: Erythema and lesion (see wound  description below) present. No abrasion, ecchymosis, laceration or rash.      Nails: There is no clubbing.        Comments: Toenails 1-5 bilaterally discolored/yellowed, dystrophic, brittle with subungual debris.    Neurological:      Sensory: Sensory deficit present.      Comments:  [place] 5.07 monofilamant testing is diminished Kp feet. Decreased/absent vibratory sensation bilateral feet to 128Hz tuning fork.     Paresthesias, and hyperesthesia bilateral feet with no clearly identified trigger or source.           Psychiatric:         Attention and Perception: He is attentive.         Mood and Affect: Mood is not anxious. Affect is not inappropriate.         Speech: He is communicative. Speech is not slurred.         Behavior: Behavior is not combative.         Vitals:    11/06/20 1016   BP: (!) 169/85   Pulse: 99   Temp: 98.1 °F (36.7 °C)       Assessment:           ICD-10-CM ICD-9-CM   1. 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  E11.621 250.80    L97.526 707.15            Negative Pressure Wound Therapy  08/21/20 1007 Left lateral (Active)   08/21/20 1007   Side: Left   Orientation: lateral   Location: Foot   Additional Comments:    Location:    SDO Location:    NPWT Type Vacuum Therapy 11/06/20 1000   Therapy Setting NPWT Continuous therapy 11/06/20 1000   Pressure Setting NPWT 125 mmHg 11/06/20 1000   Sponges Inserted NPWT 1;Black 11/06/20 1000   Sponges Removed NPWT 1 11/06/20 1000   General Output (mL) 30 11/06/20 1000            Incision/Site 07/17/20 1251 Left Foot (Active)   07/17/20 1251   Present Prior to Hospital Arrival?:    Side: Left   Location: Foot   Orientation:    Incision Type:    Closure Method: Other (see comments)   Additional Comments:  open wound .betadine soak in 4x4's, abd, ace, castpadding, surgical boot   Removal Indication and Assessment:    Wound Outcome:    Removal Indications:    Wound Image    11/06/20 1000   Incision WDL ex 11/06/20 1000   Dressing Appearance Moist drainage 11/06/20 1000   Drainage Amount Small 11/06/20 1000   Drainage Characteristics/Odor Serosanguineous 11/06/20 1000   Appearance Red;Granulating;Steri-strips intact 11/06/20 1000   Red (%), Wound Tissue Color 100 % 11/06/20 1000   Periwound Area Macerated 11/06/20 1000   Wound Edges Open 11/06/20 1000   Wound Length (cm) 2.3 cm 11/06/20 1000   Wound Width (cm) 2.4 cm 11/06/20 1000   Wound Depth (cm) 0.2 cm 11/06/20 1000   Wound Volume (cm^3) 1.1 cm^3 11/06/20 1000   Wound Surface Area (cm^2) 5.52 cm^2 11/06/20 1000   Care Cleansed with:;Soap and water;Antimicrobial agent 11/06/20 1000   Dressing Changed 11/06/20 1000   Off Loading Football dressing 11/06/20 1000           Plan:              Greater than 50% of this visit spent on counseling and coordination of care.    Education about the prevention of limb loss.    Discussed wound healing cycle, skin integrity, ways to care for skin.Counseled patient on the effects of blood glucose on healing. He verbalizes understanding that it can increase the chances of delayed healing and this prolonged exposure leads to infection or progression of infection which subsequently can result in loss of limb.    Base is granular with neox in place.      VAC applied in clinic today        Orders Placed This Encounter   Procedures    Change dressing     Gentian violet, Cavilon/Benzoin periwound, adaptic touch without steristrips, black foam NPWT, ABD, jaleel foam, football dressing (cast padding X 3), single layer medium tubigrip. Nurse visit to change wound vac.        Follow up in about 11 days (around 11/17/2020) for vac change.

## 2020-11-10 ENCOUNTER — HOSPITAL ENCOUNTER (OUTPATIENT)
Dept: WOUND CARE | Facility: HOSPITAL | Age: 55
Discharge: HOME | End: 2020-11-10
Attending: FAMILY MEDICINE
Payer: COMMERCIAL

## 2020-11-10 ENCOUNTER — OFFICE VISIT (OUTPATIENT)
Dept: CARDIOLOGY | Facility: CLINIC | Age: 55
End: 2020-11-10
Payer: COMMERCIAL

## 2020-11-10 ENCOUNTER — PATIENT OUTREACH (OUTPATIENT)
Dept: ADMINISTRATIVE | Facility: OTHER | Age: 55
End: 2020-11-10

## 2020-11-10 VITALS
DIASTOLIC BLOOD PRESSURE: 87 MMHG | TEMPERATURE: 97 F | HEIGHT: 73 IN | RESPIRATION RATE: 18 BRPM | SYSTOLIC BLOOD PRESSURE: 186 MMHG | HEART RATE: 94 BPM | SYSTOLIC BLOOD PRESSURE: 128 MMHG | OXYGEN SATURATION: 95 % | HEART RATE: 94 BPM | DIASTOLIC BLOOD PRESSURE: 76 MMHG | BODY MASS INDEX: 35.66 KG/M2

## 2020-11-10 DIAGNOSIS — E11.621 DIABETIC ULCER OF LEFT FOOT ASSOCIATED WITH TYPE 2 DIABETES MELLITUS, WITH BONE INVOLVEMENT WITHOUT EVIDENCE OF NECROSIS: Primary | ICD-10-CM

## 2020-11-10 DIAGNOSIS — Z79.4 TYPE 2 DIABETES MELLITUS WITH STAGE 4 CHRONIC KIDNEY DISEASE, WITH LONG-TERM CURRENT USE OF INSULIN: ICD-10-CM

## 2020-11-10 DIAGNOSIS — L97.526 DIABETIC ULCER OF LEFT FOOT ASSOCIATED WITH TYPE 2 DIABETES MELLITUS, WITH BONE INVOLVEMENT WITHOUT EVIDENCE OF NECROSIS: Primary | ICD-10-CM

## 2020-11-10 DIAGNOSIS — R79.89 ELEVATED TROPONIN: ICD-10-CM

## 2020-11-10 DIAGNOSIS — Z86.31 HISTORY OF DIABETIC ULCER OF FOOT: ICD-10-CM

## 2020-11-10 DIAGNOSIS — E11.22 TYPE 2 DIABETES MELLITUS WITH STAGE 4 CHRONIC KIDNEY DISEASE, WITH LONG-TERM CURRENT USE OF INSULIN: ICD-10-CM

## 2020-11-10 DIAGNOSIS — N18.4 TYPE 2 DIABETES MELLITUS WITH STAGE 4 CHRONIC KIDNEY DISEASE, WITH LONG-TERM CURRENT USE OF INSULIN: ICD-10-CM

## 2020-11-10 DIAGNOSIS — U07.1 COVID-19 VIRUS INFECTION: ICD-10-CM

## 2020-11-10 DIAGNOSIS — M86.9 OSTEOMYELITIS OF LEFT FOOT, UNSPECIFIED TYPE: ICD-10-CM

## 2020-11-10 DIAGNOSIS — E78.5 HYPERLIPIDEMIA, ACQUIRED: ICD-10-CM

## 2020-11-10 DIAGNOSIS — I10 ESSENTIAL HYPERTENSION: Primary | ICD-10-CM

## 2020-11-10 DIAGNOSIS — N52.9 ERECTILE DYSFUNCTION, UNSPECIFIED ERECTILE DYSFUNCTION TYPE: ICD-10-CM

## 2020-11-10 PROCEDURE — 3008F PR BODY MASS INDEX (BMI) DOCUMENTED: ICD-10-PCS | Mod: CPTII,S$GLB,, | Performed by: INTERNAL MEDICINE

## 2020-11-10 PROCEDURE — 99999 PR PBB SHADOW E&M-EST. PATIENT-LVL IV: CPT | Mod: PBBFAC,,, | Performed by: INTERNAL MEDICINE

## 2020-11-10 PROCEDURE — 97605 NEG PRS WND THER DME<=50SQCM: CPT

## 2020-11-10 PROCEDURE — 3044F PR MOST RECENT HEMOGLOBIN A1C LEVEL <7.0%: ICD-10-PCS | Mod: CPTII,S$GLB,, | Performed by: INTERNAL MEDICINE

## 2020-11-10 PROCEDURE — 3074F SYST BP LT 130 MM HG: CPT | Mod: CPTII,S$GLB,, | Performed by: INTERNAL MEDICINE

## 2020-11-10 PROCEDURE — 3074F PR MOST RECENT SYSTOLIC BLOOD PRESSURE < 130 MM HG: ICD-10-PCS | Mod: CPTII,S$GLB,, | Performed by: INTERNAL MEDICINE

## 2020-11-10 PROCEDURE — 99214 PR OFFICE/OUTPT VISIT, EST, LEVL IV, 30-39 MIN: ICD-10-PCS | Mod: S$GLB,,, | Performed by: INTERNAL MEDICINE

## 2020-11-10 PROCEDURE — 99999 PR PBB SHADOW E&M-EST. PATIENT-LVL IV: ICD-10-PCS | Mod: PBBFAC,,, | Performed by: INTERNAL MEDICINE

## 2020-11-10 PROCEDURE — 3078F DIAST BP <80 MM HG: CPT | Mod: CPTII,S$GLB,, | Performed by: INTERNAL MEDICINE

## 2020-11-10 PROCEDURE — 99214 OFFICE O/P EST MOD 30 MIN: CPT | Mod: S$GLB,,, | Performed by: INTERNAL MEDICINE

## 2020-11-10 PROCEDURE — 3078F PR MOST RECENT DIASTOLIC BLOOD PRESSURE < 80 MM HG: ICD-10-PCS | Mod: CPTII,S$GLB,, | Performed by: INTERNAL MEDICINE

## 2020-11-10 PROCEDURE — 3008F BODY MASS INDEX DOCD: CPT | Mod: CPTII,S$GLB,, | Performed by: INTERNAL MEDICINE

## 2020-11-10 PROCEDURE — 3044F HG A1C LEVEL LT 7.0%: CPT | Mod: CPTII,S$GLB,, | Performed by: INTERNAL MEDICINE

## 2020-11-10 RX ORDER — CARVEDILOL 25 MG/1
25 TABLET ORAL 2 TIMES DAILY
Qty: 180 TABLET | Refills: 3 | Status: SHIPPED | OUTPATIENT
Start: 2020-11-10 | End: 2021-01-15 | Stop reason: SDUPTHER

## 2020-11-10 RX ORDER — HYDRALAZINE HYDROCHLORIDE 100 MG/1
100 TABLET, FILM COATED ORAL 3 TIMES DAILY
Qty: 90 TABLET | Refills: 3 | Status: SHIPPED | OUTPATIENT
Start: 2020-11-10 | End: 2020-11-24 | Stop reason: SDUPTHER

## 2020-11-10 NOTE — PROGRESS NOTES
CARDIOVASCULAR CONSULTATION    REASON FOR CONSULT:   Catalino Mcfadden is a 55 y.o. male who presents for for evaluation of hypertension.      HISTORY OF PRESENT ILLNESS:     Patient is a pleasant 54-year-old man.  Has been referred to me because of elevated blood pressure.  Is being followed by Podiatry also for ulcers on his feet.  States that the ulcers have been healing fine.  Denies any chest pains at rest on exertion, orthopnea, PND, swelling of feet.  ABIs were checked and were within normal limits.  States that lately has been eating a lot of salt in his diet and has not been eating well.  States that he checks his blood pressure at home and generally stays around 150-160 mm systolic.      The right ankle brachial index was 1.16 which is normal.   The left ankle brachial index was 1.27 which is normal.   The right TBI is 0.79.   The left TBI is 0.68.     1. Right lower extremity pressures and waveforms indicate no hemodynamically significant arterial occlusive disease.  2. Left lower extremity pressures and waveforms indicate no hemodynamically significant arterial occlusive disease.      Notes from February 2020:  Patient here for follow-up.  Denies any chest pains at rest on exertion, orthopnea, PND.  Blood pressure elevated in clinic today.  States has been compliant with his medications.  Denies any chest pain at rest on exertion, PND, headaches.  States that when it is a pressure medication sometimes he feels a little dizzy after taking the medications.  States that occasionally checks his blood pressure at when he gets dizzy and usually it is around 30-1 40/90 mm mercury.  However does not check his blood pressure regularly.      Notes from February 20th a 2020:  Patient here for follow-up.  Brings a blood pressure log from home.  Blood pressure much better controlled now.  At home blood pressure around 120-1 30/80 mm systolic.  Denies any symptoms of chest pains at rest on exertion, orthopnea, PND,  swelling of feet.      · Concentric left ventricular remodeling.  · Normal left ventricular systolic function. The estimated ejection fraction is 65%.  · Normal LV diastolic function.  · No wall motion abnormalities.  · Normal right ventricular systolic function.  · No pulmonary hypertension present.     Notes from October 2020:  Patient here for follow-up.  Denies any chest pains at rest on exertion, orthopnea, PND.  States that home blood pressure stays around 140-150 mm of mercury.  However has been taking his Coreg only once a day instead of twice a day as prescribed    PAST MEDICAL HISTORY:     Past Medical History:   Diagnosis Date    CKD (chronic kidney disease), stage III 07/16/2020    CKD stage 3 due to type 1 diabetes mellitus     CKD stage 3 due to type 2 diabetes mellitus     Diabetes mellitus, type 2     Diabetic foot ulcer associated with diabetes mellitus due to underlying condition 07/16/2020    Diabetic foot ulcers     Edema 08/03/2020    generalized, including genital area    Hyperlipidemia     Hypertension     Obese        PAST SURGICAL HISTORY:     Past Surgical History:   Procedure Laterality Date    BONE BIOPSY Left 7/17/2020    Procedure: BIOPSY, BONE;  Surgeon: Verona Whitmore DPM;  Location: [facility] OR;  Service: Podiatry;  Laterality: Left;    CERVICAL DISC SURGERY  07/11/2016    DEBRIDEMENT Left 7/17/2020    Procedure: DEBRIDEMENT;  Surgeon: Verona Whitmore DPM;  Location: [facility] OR;  Service: Podiatry;  Laterality: Left;    DEBRIDEMENT OF FOOT Right     toes & plantar surface    FRACTURE SURGERY Right     medial ankle, metal plate present    left leg orthopedic surgery Left        ALLERGIES AND MEDICATION:     Review of patient's allergies indicates:  No Known Allergies     Medication List          Accurate as of November 10, 2020  9:54 AM. If you have any questions, ask your nurse or doctor.            CONTINUE taking these medications    amLODIPine 10 MG tablet  Commonly  "known as: NORVASC  Take 1 tablet (10 mg total) by mouth once daily.     atorvastatin 40 MG tablet  Commonly known as: LIPITOR  Take 1 tablet (40 mg total) by mouth once daily.     BASAGLAR KWIKPEN U-100 INSULIN glargine 100 units/mL (3mL) SubQ pen  Generic drug: insulin     blood sugar diagnostic Strp  1 strip by Misc.(Non-Drug; Combo Route) route 3 (three) times daily. Patient needs One Touch Test Strips (Berio).     blood-glucose meter kit  To check BG 3 times daily, to use with insurance preferred meter; Accu check.     carvediloL 25 MG tablet  Commonly known as: COREG  Take 1 tablet (25 mg total) by mouth 2 (two) times daily.     furosemide 40 MG tablet  Commonly known as: LASIX  Take 1 tablet (40 mg total) by mouth once daily.     gabapentin 300 MG capsule  Commonly known as: NEURONTIN  Take 1 capsule (300 mg total) by mouth 2 (two) times daily.     hydrALAZINE 100 MG tablet  Commonly known as: APRESOLINE  Take 1 tablet (100 mg total) by mouth every 8 (eight) hours.     insulin aspart U-100 100 unit/mL (3 mL) Inpn pen  Commonly known as: NovoLOG  Inject 5 Units into the skin 3 (three) times daily with meals.     insulin detemir U-100 100 unit/mL (3 mL) Inpn pen  Commonly known as: LEVEMIR FLEXTOUCH  Inject 10 Units into the skin every evening.     pen needle, diabetic 32 gauge x 3/16" Ndle  1 each by Misc.(Non-Drug; Combo Route) route 5 (five) times daily.     PIPERACILLIN-TAZOBACTAM 4.5G/100ML SODIUM CHLORIDE 0.9%-READY TO MIX  Inject 100 mLs (4.5 g total) into the vein every 12 (twelve) hours.     pulse oximeter device  Commonly known as: pulse oximeter  by Apply Externally route 2 (two) times a day. Use twice daily at 8 AM and 3 PM and record the value in [facility] as directed.     TRULICITY 0.75 mg/0.5 mL pen injector  Generic drug: dulaglutide  INJECT 0.5 ML UNDER THE SKIN EVERY 7 DAYS            SOCIAL HISTORY:     Social History     Socioeconomic History    Marital status:      Spouse name: Not on " "file    Number of children: Not on file    Years of education: Not on file    Highest education level: Not on file   Occupational History    Not on file   Social Needs    Financial resource strain: Not on file    Food insecurity     Worry: Not on file     Inability: Not on file    Transportation needs     Medical: Not on file     Non-medical: Not on file   Tobacco Use    Smoking status: Never Smoker    Smokeless tobacco: Never Used   Substance and Sexual Activity    Alcohol use: Not Currently     Alcohol/week: 0.0 standard drinks     Comment: social    Drug use: No    Sexual activity: Not on file   Lifestyle    Physical activity     Days per week: Not on file     Minutes per session: Not on file    Stress: Not on file   Relationships    Social connections     Talks on phone: Not on file     Gets together: Not on file     Attends Gnosticism service: Not on file     Active member of club or organization: Not on file     Attends meetings of clubs or organizations: Not on file     Relationship status: Not on file   Other Topics Concern    Not on file   Social History Narrative    Not on file       FAMILY HISTORY:     Family History   Problem Relation Age of Onset    Heart disease Father        REVIEW OF SYSTEMS:   Review of Systems   Constitution: Negative.   HENT: Negative.    Eyes: Negative.    Respiratory: Negative.    Endocrine: Negative.    Hematologic/Lymphatic: Negative.    Skin: Negative.    Musculoskeletal: Negative.    Gastrointestinal: Negative.    Genitourinary: Negative.    Neurological: Negative.    Psychiatric/Behavioral: Negative.    Allergic/Immunologic: Negative.        A 10 point review of systems was performed and all the pertinent positives have been mentioned. Rest of review of systems was negative.        PHYSICAL EXAM:     Vitals:    11/10/20 0944   BP: 128/76   Pulse: 94    Body mass index is 35.66 kg/m².      Height: 6' 1" (185.4 cm)     Physical Exam   Constitutional: He is " oriented to person, place, and time. He appears well-developed and well-nourished.   HENT:   Head: Normocephalic.   Eyes: Pupils are equal, round, and reactive to light. Conjunctivae are normal.   Neck: Normal range of motion. Neck supple.   Cardiovascular: Normal rate, regular rhythm and normal heart sounds.   Pulmonary/Chest: Effort normal and breath sounds normal.   Abdominal: Soft. Bowel sounds are normal.   Neurological: He is alert and oriented to person, place, and time.   Skin: Skin is warm.   Vitals reviewed.        DATA:     Laboratory:  CBC:  Recent Labs   Lab 08/23/20  0900 08/31/20  1700 10/21/20  1414   WBC 5.09  5.09 6.23 5.35   Hemoglobin 8.6 L  8.6 L 9.1 L 10.8 L   Hematocrit 28.2 L  28.2 L 28.6 L 35.0 L   Platelets 251  251 240 340       CHEMISTRIES:  Recent Labs   Lab 07/20/20  0553 07/22/20  0508  08/05/20  0522  08/23/20  0900 08/31/20  1700 10/21/20  1414   Glucose 359 H 396 H   < > 105   < > 209 H 88 111 H   Sodium 125 L 127 L   < > 133 L   < > 136 136 135 L   Potassium 5.1 5.3 H   < > 4.6   < > 4.1 4.0 5.0   BUN 82 H 94 H   < > 55 H   < > 49 H 40 H 40 H   Creatinine 3.3 H 3.6 H   < > 3.7 H   < > 3.4 H 2.8 H 3.2 H   eGFR if  23 A 21 A   < > 20 A   < > 22 A 28 A 23.7 A   eGFR if non  20 A 18 A   < > 17 A   < > 19 A 24 A 20.5 A   Calcium 8.2 L 8.2 L   < > 8.4 L   < > 7.8 L 8.4 L 9.2   Magnesium 1.9 1.9  --  1.7  --   --   --   --     < > = values in this interval not displayed.       CARDIAC BIOMARKERS:  Recent Labs   Lab 08/03/20  2226 08/04/20  0457 08/04/20  1028 08/10/20  1545   CPK  --   --   --  43   Troponin I 0.101 H 0.126 H 0.110 H  --        COAGS:  Recent Labs   Lab 08/03/20  1703   INR 1.1       LIPIDS/LFTS:  Recent Labs   Lab 05/27/19  0850 08/28/19  1111  08/23/20  0900 08/31/20  1700 10/21/20  1414   Cholesterol 230 H 173  --   --   --  238 H   Triglycerides 113 92  --   --   --  98   HDL 49 45  --   --   --  56   LDL Cholesterol 158.4 109.6  --    --   --  162.4 H   Non-HDL Cholesterol 181 128  --   --   --  182   AST 37 32   < > 43 H 36 32   ALT 43 41   < > 46 H 36 33    < > = values in this interval not displayed.       Hemoglobin A1C   Date Value Ref Range Status   10/21/2020 6.4 (H) 4.0 - 5.6 % Final     Comment:     ADA Screening Guidelines:  5.7-6.4%  Consistent with prediabetes  >or=6.5%  Consistent with diabetes  High levels of fetal hemoglobin interfere with the HbA1C  assay. Heterozygous hemoglobin variants (HbS, HgC, etc)do  not significantly interfere with this assay.   However, presence of multiple variants may affect accuracy.     07/17/2020 8.1 (H) 4.0 - 5.6 % Final     Comment:     ADA Screening Guidelines:  5.7-6.4%  Consistent with prediabetes  >or=6.5%  Consistent with diabetes  High levels of fetal hemoglobin interfere with the HbA1C  assay. Heterozygous hemoglobin variants (HbS, HgC, etc)do  not significantly interfere with this assay.   However, presence of multiple variants may affect accuracy.     01/27/2020 8.4 (H) 4.0 - 5.6 % Final     Comment:     ADA Screening Guidelines:  5.7-6.4%  Consistent with prediabetes  >or=6.5%  Consistent with diabetes  High levels of fetal hemoglobin interfere with the HbA1C  assay. Heterozygous hemoglobin variants (HbS, HgC, etc)do  not significantly interfere with this assay.   However, presence of multiple variants may affect accuracy.         TSH  Recent Labs   Lab 06/07/19  1447 07/18/20  1652 08/03/20  1426   TSH 0.966 0.104 L 0.472       The 10-year ASCVD risk score (Pat DALLIN Jr., et al., 2013) is: 21%    Values used to calculate the score:      Age: 55 years      Sex: Male      Is Non- : Yes      Diabetic: Yes      Tobacco smoker: No      Systolic Blood Pressure: 128 mmHg      Is BP treated: Yes      HDL Cholesterol: 56 mg/dL      Total Cholesterol: 238 mg/dL             ASSESSMENT AND PLAN     Patient Active Problem List   Diagnosis    Type 2 diabetes mellitus with  stage 4 chronic kidney disease, with long-term current use of insulin    Essential hypertension    Hyperlipidemia, acquired    ED (erectile dysfunction)    History of diabetic ulcer of foot    Osteomyelitis of second toe of left foot    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    Long term (current) use of antibiotics    Acute osteomyelitis of metatarsal bone of left foot    Diabetic ulcer of toe of left foot    COVID-19 virus infection    Hyponatremia    Osteomyelitis of left foot    Elevated troponin    Hypoalbuminemia       1.  Hypertension: Well controlled on current therapy.  Continue current therapy.    2D echocardiogram showed normal LV systolic function with mild LVH.    · Concentric left ventricular remodeling.  · Normal left ventricular systolic function. The estimated ejection fraction is 65%.  · Normal LV diastolic function.  · No wall motion abnormalities.  · Normal right ventricular systolic function.  · No pulmonary hypertension present.       2.  Chronic kidney disease:  Management per Nephrology    Follow-up in Cardiology Clinic in 3 month.      3.  Recent COVID infection      Thank you very much for involving me in the care of your patient.  Please do not hesitate to contact me if there are any questions.      Marylou Shaver MD, FACC, [facility]  Interventional Cardiologist, [facility].           This note was dictated with the help of speech recognition software.  There might be un-intended errors and/or substitutions.

## 2020-11-10 NOTE — PROGRESS NOTES
[facility]  CONSTANZA Newell  [id number]  Nurse Visit    Subjective:       Patient seen in clinic today.  Dressing changed as ordered.      Assessment:          Negative Pressure Wound Therapy  08/21/20 1007 Left lateral (Active)   08/21/20 1007   Side: Left   Orientation: lateral   Location: Foot   Additional Comments:    Location:    SDO Location:    NPWT Type Vacuum Therapy 11/10/20 0900   Therapy Setting NPWT Continuous therapy 11/10/20 0900   Pressure Setting NPWT 125 mmHg 11/10/20 0900   Therapy Interventions NPWT Dressing changed;Seal intact 11/10/20 0900   Sponges Inserted NPWT Black;1 11/10/20 0900   Sponges Removed NPWT Black;1 11/10/20 0900   General Output (mL) 25 11/10/20 0900            Incision/Site 07/17/20 1251 Left Foot (Active)   07/17/20 1251   Present Prior to Hospital Arrival?:    Side: Left   Location: Foot   Orientation:    Incision Type:    Closure Method: Other (see comments)   Additional Comments:  open wound .betadine soak in 4x4's, abd, ace, castpadding, surgical boot   Removal Indication and Assessment:    Wound Outcome:    Removal Indications:    Incision WDL ex 11/10/20 0900   Dressing Appearance Intact;Moist drainage 11/10/20 0900   Drainage Amount Moderate 11/10/20 0900   Drainage Characteristics/Odor Serosanguineous;Malodorous 11/10/20 0900   Appearance Red;White;Tendon;Sutures intact 11/10/20 0900   Black (%), Wound Tissue Color 0 % 11/10/20 0900   Red (%), Wound Tissue Color 100 % 11/10/20 0900   Yellow (%), Wound Tissue Color 0 % 11/10/20 0900   Periwound Area Macerated 11/10/20 0900   Wound Edges Defined 11/10/20 0900   Tunneling (depth (cm)/location) 0 11/10/20 0900   Undermining (depth (cm)/location) 0 11/10/20 0900   Care Soap and water;Sterile normal saline;Wound cleanser 11/10/20 0900   Dressing Applied;Other (see comments);Foam 11/10/20 0900   Periwound Care Other (see comments) 11/10/20 0900   Compression Tubular elasticized bandage  11/10/20 0900   Off Loading Football dressing 11/10/20 0900   Dressing Change Due 11/13/20 11/10/20 0900           Plan:     No orders of the defined types were placed in this encounter.          No follow-ups on file.

## 2020-11-10 NOTE — PROGRESS NOTES
Health Maintenance Due   Topic Date Due    HIV Screening  04/04/1979    TETANUS VACCINE  04/04/1982    Shingles Vaccine (1 of 2) 04/04/2014    Eye Exam  04/03/2019    Colorectal Cancer Screening  06/04/2019     Updates were requested from care everywhere.  Chart was reviewed for overdue [facility]active [facility]) topics (CRS, Breast Cancer Screening, Eye exam)  Health Maintenance has been updated.  LIN[place] immunization registry triggered.  Immunizations were reconciled.

## 2020-11-12 ENCOUNTER — DOCUMENT SCAN (OUTPATIENT)
Dept: HOME HEALTH SERVICES | Facility: HOSPITAL | Age: 55
End: 2020-11-12
Payer: COMMERCIAL

## 2020-11-13 ENCOUNTER — HOSPITAL ENCOUNTER (OUTPATIENT)
Dept: WOUND CARE | Facility: HOSPITAL | Age: 55
Discharge: HOME | End: 2020-11-13
Attending: PODIATRIST
Payer: COMMERCIAL

## 2020-11-13 VITALS — SYSTOLIC BLOOD PRESSURE: 170 MMHG | DIASTOLIC BLOOD PRESSURE: 90 MMHG | HEART RATE: 84 BPM | TEMPERATURE: 97 F

## 2020-11-13 DIAGNOSIS — E11.621 DIABETIC ULCER OF LEFT FOOT ASSOCIATED WITH TYPE 2 DIABETES MELLITUS, WITH BONE INVOLVEMENT WITHOUT EVIDENCE OF NECROSIS: Primary | ICD-10-CM

## 2020-11-13 DIAGNOSIS — L97.526 DIABETIC ULCER OF LEFT FOOT ASSOCIATED WITH TYPE 2 DIABETES MELLITUS, WITH BONE INVOLVEMENT WITHOUT EVIDENCE OF NECROSIS: Primary | ICD-10-CM

## 2020-11-13 PROCEDURE — 15275 SKIN SUB GRAFT FACE/NK/HF/G: CPT | Mod: ,,, | Performed by: PODIATRIST

## 2020-11-13 PROCEDURE — 15275 PR SKIN SUB GRAFT FACE/NK/HF/G UP TO 100 SQCM: ICD-10-PCS | Mod: ,,, | Performed by: PODIATRIST

## 2020-11-13 PROCEDURE — 99499 UNLISTED E&M SERVICE: CPT | Mod: ,,, | Performed by: PODIATRIST

## 2020-11-13 PROCEDURE — 99499 NO LOS: ICD-10-PCS | Mod: ,,, | Performed by: PODIATRIST

## 2020-11-13 PROCEDURE — 15275 SKIN SUB GRAFT FACE/NK/HF/G: CPT | Performed by: PODIATRIST

## 2020-11-13 NOTE — PROGRESS NOTES
[facility]                Progress Note    Subjective:       Patient ID: Catalino Mcfadden is a 55 y.o. male.    Chief Complaint: No chief complaint on file.    Patient ambulated to exam bed without assistance. No complaints of pain. Drainage controlled with only 20 cc roughly of serosanguinous drainage in vac cannister. Left Lateral Foot Incision for this DM II patient is measuring much smaller today. No new swelling to LLE. Neox was placed last week with nurse visit this week changing outer dressing. Continued Neox today and applied foam football with plan to reapply vac on Tuesday during nurse visit next week.       Review of Systems   Constitutional: Negative for appetite change, chills and fever.   Respiratory: Negative for cough, shortness of breath and wheezing.    Cardiovascular: Positive for leg swelling. Negative for chest pain.   Gastrointestinal: Negative for diarrhea, nausea and vomiting.   Musculoskeletal: Positive for arthralgias, joint swelling and myalgias.   Skin: Positive for wound.   Neurological: Positive for numbness and headaches. Negative for weakness.        + paresthesia          Objective:        Physical Exam  Vitals signs and nursing note reviewed.   Constitutional:       General: He is not in acute distress.     Appearance: He is not toxic-appearing or diaphoretic.      Comments: Pt. is well-developed, well-nourished, appears stated age, in no acute distress, alert and oriented x 3. No evidence of depression, anxiety, or agitation. Calm, cooperative, and communicative. Appropriate interactions and affect.   Cardiovascular:      Pulses:           Dorsalis pedis pulses are 2+ on the right side and 2+ on the left side.        Posterior tibial pulses are 1+ on the right side and 1+ on the left side.      Comments: There is decreased digital hair. Skin is atrophic, slightly hyperpigmented  Pulmonary:      Effort: No respiratory distress.    Musculoskeletal:      Right ankle: He exhibits normal range of motion and no swelling. No tenderness. No lateral malleolus, no medial malleolus, no AITFL, no CF ligament and no posterior TFL tenderness found. Achilles tendon exhibits no pain, no defect and normal Hilliard's test results.      Left ankle: He exhibits normal range of motion and no swelling. No tenderness. No lateral malleolus, no medial malleolus, no AITFL, no CF ligament and no posterior TFL tenderness found. Achilles tendon exhibits no pain, no defect and normal Hilliard's test results.      Right foot: No tenderness or bony tenderness.      Left foot: No tenderness or bony tenderness.      Comments: Decreased stride, station of gait.  apropulsive toe off.  Increased angle and base of gait.    There is equinus deformity bilateral with decreased dorsiflexion at the ankle joint bilateral. No tenderness with compression of heel. Negative tinels sign. Gait analysis reveals excessive pronation through midstance and propulsion with early heel off.     Patient has hammertoes of digits 2-5 bilateral partially reducible     Decreased first MPJ range of motion both weightbearing and nonweightbearing, no crepitus observed the first MP joint, + dorsal flag sign.     Visible and palpable bunion without pain at dorsomedial 1st metatarsal head right and left.  Hallux abducted right and left partially reducible, tracks laterally without being track bound.  No ecchymosis, erythema, edema, or cardinal signs infection or signs of trauma same foot.    Fat pad atrophy to heels and met heads bilateral    Plantarflexed 1st ray RLE   Lymphadenopathy:      Comments: No lymphatic streaking    Negative lymphadenopathy bilateral popliteal fossa and tarsal tunnel.     Skin:     General: Skin is warm and dry.      Coloration: Skin is not pale.      Findings: Erythema and lesion (see wound description below) present. No abrasion, ecchymosis, laceration or rash.      Nails:  There is no clubbing.        Comments: Toenails 1-5 bilaterally discolored/yellowed, dystrophic, brittle with subungual debris.    Neurological:      Sensory: Sensory deficit present.      Comments:  [place] 5.07 monofilamant testing is diminished Kp feet. Decreased/absent vibratory sensation bilateral feet to 128Hz tuning fork.     Paresthesias, and hyperesthesia bilateral feet with no clearly identified trigger or source.           Psychiatric:         Attention and Perception: He is attentive.         Mood and Affect: Mood is not anxious. Affect is not inappropriate.         Speech: He is communicative. Speech is not slurred.         Behavior: Behavior is not combative.         Vitals:    11/13/20 1044   BP: (!) 170/90   Pulse: 84   Temp: 97.3 °F (36.3 °C)       Assessment:           ICD-10-CM ICD-9-CM   1. 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  E11.621 250.80    L97.526 707.15            Incision/Site 07/17/20 1251 Left Foot (Active)   07/17/20 1251   Present Prior to Hospital Arrival?:    Side: Left   Location: Foot   Orientation:    Incision Type:    Closure Method: Other (see comments)   Additional Comments:  open wound .betadine soak in 4x4's, abd, ace, castpadding, surgical boot   Removal Indication and Assessment:    Wound Outcome:    Removal Indications:    Wound Image   11/13/20 1000   Incision WDL ex 11/13/20 1000   Dressing Appearance Moist drainage 11/13/20 1000   Drainage Amount Moderate 11/13/20 1000   Drainage Characteristics/Odor Serosanguineous 11/13/20 1000   Appearance Red;Granulating 11/13/20 1000   Red (%), Wound Tissue Color 100 % 11/13/20 1000   Periwound Area Macerated;Pale white 11/13/20 1000   Wound Edges Open 11/13/20 1000   Wound Length (cm) 2 cm 11/13/20 1000   Wound Width (cm) 1.5 cm 11/13/20 1000   Wound Depth (cm) 0.2 cm 11/13/20 1000   Wound Volume (cm^3) 0.6 cm^3 11/13/20 1000   Wound  Surface Area (cm^2) 3 cm^2 11/13/20 1000   Care Cleansed with:;Soap and water;Sterile normal saline 11/13/20 1000   Dressing Changed 11/13/20 1000           Plan:              Wound is measuring smaller. Debridement performed. Neox (graft) with sutures applied.  orders on hold due to graft. Pt will come for a nurse visit Tuesday. Discussed elevating as much as possible.      NEOX OP REPORT    SURGEON: Aviva Martinez DPM  ASSITANT: None  PRE-OP DX: Ulceration secondary to diabetes mellitus and peripheral neuropathy with sluggish response to prior treatment.  POST-OP DX: same.  ANESTHESIA: Pt. has loss of sensation and therefore no anesthesia was required.  HEMOSTASIS: pressure  EBL: less than 5cc.    Time Out performed to verify patient and site and consent obtained.    Procedure: The patient was seen in the wound clinic room and placed in supine position on the treatment table.  Attention was directed to the ulcer which was located on the left plantar lateral foot, where an ulceration measuring  2.0 x 1.5 x 0.25 cm is noted.  The ulceration was had a  mild callused rim with periwound maceration.  No acute signs of infection were noted.  The wound is nontender.  Utilizing a 3 mm curette, I debrided 100% of the wound full-thickness through subcutaneous tissue to excise viable and nonviable tissue outside the wound margins.  Patient tolerated well.  The wound was flushed with sterile saline.  There was minimal bleeding with debridement which was well controlled with direct pressure.  A 3 x 3 cm NEOX sheet was then inspected and noted to be in acceptable.  It was then removed sterilely .  The sheet was then fenestrated with the scalpel and then cut and sized and shaped to the wound.  I utilized the entirety with overlapping edges against the wound.  The NEOX was adhered down with saline soaked cotton swabs and then secured in place 3-0 prolene and covered with adaptic touch non-adherent layer followed by football  dressing in lieu of VAC. Will apply VAC on nurse visit.  The patient having tolerated the procedure well left the clinic with all vital signs stable and vascular status intact to all digits of both feet.     Short-term goals including promoting granulation and epithelialization of the wound. Long term goals include maintaining a healed wound with appropriate offloading and good medical management per internist.    Offloading: CAM boot      Orders Placed This Encounter   Procedures    Change dressing     Gentian violet, Cavilon/Benzoin periwound, adaptic touch without steristrips, mextra, marycarmen foam x 2, cast padding x 3, tubigrip medium, coban with plans for black foam NPWT, ABD, jaleel foam, football dressing (cast padding X 3), single layer medium tubigrip during Nurse visit to reapply wound vac.        Follow up in about 4 days (around 11/17/2020) for reapply vac.

## 2020-11-17 ENCOUNTER — HOSPITAL ENCOUNTER (OUTPATIENT)
Dept: WOUND CARE | Facility: HOSPITAL | Age: 55
Discharge: HOME | End: 2020-11-17
Attending: FAMILY MEDICINE
Payer: COMMERCIAL

## 2020-11-17 VITALS
RESPIRATION RATE: 17 BRPM | SYSTOLIC BLOOD PRESSURE: 156 MMHG | DIASTOLIC BLOOD PRESSURE: 83 MMHG | TEMPERATURE: 97 F | HEART RATE: 81 BPM

## 2020-11-17 DIAGNOSIS — E11.621 DIABETIC ULCER OF LEFT FOOT ASSOCIATED WITH TYPE 2 DIABETES MELLITUS, WITH BONE INVOLVEMENT WITHOUT EVIDENCE OF NECROSIS: Primary | ICD-10-CM

## 2020-11-17 DIAGNOSIS — L97.526 DIABETIC ULCER OF LEFT FOOT ASSOCIATED WITH TYPE 2 DIABETES MELLITUS, WITH BONE INVOLVEMENT WITHOUT EVIDENCE OF NECROSIS: Primary | ICD-10-CM

## 2020-11-17 PROCEDURE — 97605 NEG PRS WND THER DME<=50SQCM: CPT

## 2020-11-20 ENCOUNTER — HOSPITAL ENCOUNTER (OUTPATIENT)
Dept: RADIOLOGY | Facility: HOSPITAL | Age: 55
Discharge: HOME | End: 2020-11-20
Attending: FAMILY MEDICINE
Payer: COMMERCIAL

## 2020-11-20 ENCOUNTER — HOSPITAL ENCOUNTER (OUTPATIENT)
Dept: WOUND CARE | Facility: HOSPITAL | Age: 55
Discharge: HOME | End: 2020-11-20
Attending: PODIATRIST
Payer: COMMERCIAL

## 2020-11-20 VITALS
RESPIRATION RATE: 17 BRPM | DIASTOLIC BLOOD PRESSURE: 77 MMHG | SYSTOLIC BLOOD PRESSURE: 140 MMHG | HEART RATE: 85 BPM | TEMPERATURE: 97 F

## 2020-11-20 DIAGNOSIS — E11.49 TYPE II DIABETES MELLITUS WITH NEUROLOGICAL MANIFESTATIONS: ICD-10-CM

## 2020-11-20 DIAGNOSIS — L97.526 DIABETIC ULCER OF LEFT FOOT ASSOCIATED WITH TYPE 2 DIABETES MELLITUS, WITH BONE INVOLVEMENT WITHOUT EVIDENCE OF NECROSIS: Primary | ICD-10-CM

## 2020-11-20 DIAGNOSIS — R74.8 ELEVATED ALKALINE PHOSPHATASE LEVEL: ICD-10-CM

## 2020-11-20 DIAGNOSIS — E11.621 DIABETIC ULCER OF LEFT FOOT ASSOCIATED WITH TYPE 2 DIABETES MELLITUS, WITH BONE INVOLVEMENT WITHOUT EVIDENCE OF NECROSIS: Primary | ICD-10-CM

## 2020-11-20 PROCEDURE — 76705 US ABDOMEN LIMITED: ICD-10-PCS | Mod: 26,,, | Performed by: RADIOLOGY

## 2020-11-20 PROCEDURE — 97605 NEG PRS WND THER DME<=50SQCM: CPT | Performed by: PODIATRIST

## 2020-11-20 PROCEDURE — 76705 ECHO EXAM OF ABDOMEN: CPT | Mod: 26,,, | Performed by: RADIOLOGY

## 2020-11-20 PROCEDURE — 99213 PR OFFICE/OUTPT VISIT, EST, LEVL III, 20-29 MIN: ICD-10-PCS | Mod: ,,, | Performed by: PODIATRIST

## 2020-11-20 PROCEDURE — 99213 OFFICE O/P EST LOW 20 MIN: CPT | Mod: ,,, | Performed by: PODIATRIST

## 2020-11-20 PROCEDURE — 76705 ECHO EXAM OF ABDOMEN: CPT | Mod: TC

## 2020-11-20 NOTE — PROGRESS NOTES
[facility]                Progress Note    Subjective:       Patient ID: Catalino Mcfadden is a 55 y.o. male.    Chief Complaint: Non-healing Wound Follow Up and Diabetic Foot Ulcer    11/20/2020: F/U visit. No fever. No pain. Wound is improving and measuring smaller. Macerated periwound. Removed 5 sutures from periwound. Continue same dressing. Discussed elevating as much as possible.       Review of Systems   Constitutional: Negative for appetite change, chills and fever.   Respiratory: Negative for cough, shortness of breath and wheezing.    Cardiovascular: Positive for leg swelling. Negative for chest pain.   Gastrointestinal: Negative for diarrhea, nausea and vomiting.   Musculoskeletal: Positive for arthralgias, joint swelling and myalgias.   Skin: Positive for wound.   Neurological: Positive for numbness and headaches. Negative for weakness.        + paresthesia          Objective:        Physical Exam  Vitals signs and nursing note reviewed.   Constitutional:       General: He is not in acute distress.     Appearance: He is not toxic-appearing or diaphoretic.      Comments: Pt. is well-developed, well-nourished, appears stated age, in no acute distress, alert and oriented x 3. No evidence of depression, anxiety, or agitation. Calm, cooperative, and communicative. Appropriate interactions and affect.   Cardiovascular:      Pulses:           Dorsalis pedis pulses are 2+ on the right side and 2+ on the left side.        Posterior tibial pulses are 1+ on the right side and 1+ on the left side.      Comments: There is decreased digital hair. Skin is atrophic, slightly hyperpigmented  Pulmonary:      Effort: No respiratory distress.   Musculoskeletal:      Right ankle: He exhibits normal range of motion and no swelling. No tenderness. No lateral malleolus, no medial malleolus, no AITFL, no CF ligament and no posterior TFL tenderness found. Achilles tendon exhibits no pain,  no defect and normal Hilliard's test results.      Left ankle: He exhibits normal range of motion and no swelling. No tenderness. No lateral malleolus, no medial malleolus, no AITFL, no CF ligament and no posterior TFL tenderness found. Achilles tendon exhibits no pain, no defect and normal Hilliard's test results.      Right foot: No tenderness or bony tenderness.      Left foot: No tenderness or bony tenderness.      Comments: Decreased stride, station of gait.  apropulsive toe off.  Increased angle and base of gait.    There is equinus deformity bilateral with decreased dorsiflexion at the ankle joint bilateral. No tenderness with compression of heel. Negative tinels sign. Gait analysis reveals excessive pronation through midstance and propulsion with early heel off.     Patient has hammertoes of digits 2-5 bilateral partially reducible     Decreased first MPJ range of motion both weightbearing and nonweightbearing, no crepitus observed the first MP joint, + dorsal flag sign.     Visible and palpable bunion without pain at dorsomedial 1st metatarsal head right and left.  Hallux abducted right and left partially reducible, tracks laterally without being track bound.  No ecchymosis, erythema, edema, or cardinal signs infection or signs of trauma same foot.    Fat pad atrophy to heels and met heads bilateral    Plantarflexed 1st ray RLE   Lymphadenopathy:      Comments: No lymphatic streaking    Negative lymphadenopathy bilateral popliteal fossa and tarsal tunnel.     Skin:     General: Skin is warm and dry.      Coloration: Skin is not pale.      Findings: Erythema and lesion (see wound description below) present. No abrasion, ecchymosis, laceration or rash.      Nails: There is no clubbing.        Comments: Toenails 1-5 bilaterally discolored/yellowed, dystrophic, brittle with subungual debris.    Neurological:      Sensory: Sensory deficit present.      Comments:  [place] 5.07 monofilamant testing is  diminished Kp feet. Decreased/absent vibratory sensation bilateral feet to 128Hz tuning fork.     Paresthesias, and hyperesthesia bilateral feet with no clearly identified trigger or source.           Psychiatric:         Attention and Perception: He is attentive.         Mood and Affect: Mood is not anxious. Affect is not inappropriate.         Speech: He is communicative. Speech is not slurred.         Behavior: Behavior is not combative.         Vitals:    11/20/20 1017   BP: (!) 140/77   Pulse: 85   Resp: 17   Temp: 97 °F (36.1 °C)       Assessment:           ICD-10-CM ICD-9-CM   1. 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 Type II diabetes mellitus with neurological manifestations  E11.49 250.60            Negative Pressure Wound Therapy  08/21/20 1007 Left lateral (Active)   08/21/20 1007   Side: Left   Orientation: lateral   Location: Foot   Additional Comments:    Location:    SDO Location:    NPWT Type Vacuum Therapy 11/20/20 1000   Therapy Setting NPWT Continuous therapy 11/20/20 1000   Pressure Setting NPWT 125 mmHg 11/20/20 1000   Therapy Interventions NPWT Dressing changed;Canister changed;Seal intact 11/20/20 1000   Sponges Inserted NPWT Black;1 11/20/20 1000   Sponges Removed NPWT Black;1 11/20/20 1000   General Output (mL) 10 11/20/20 1000            Incision/Site 07/17/20 1251 Left Foot (Active)   07/17/20 1251   Present Prior to Hospital Arrival?:    Side: Left   Location: Foot   Orientation:    Incision Type:    Closure Method: Other (see comments)   Additional Comments:  open wound .betadine soak in 4x4's, abd, ace, castpadding, surgical boot   Removal Indication and Assessment:    Wound Outcome:    Removal Indications:    Wound Image   11/20/20 1000   Incision WDL ex 11/20/20 1000   Dressing Appearance Intact;Moist drainage 11/20/20 1000   Drainage Amount Moderate 11/20/20 1000    Drainage Characteristics/Odor Serosanguineous 11/20/20 1000   Appearance Red;White;Yellow;Sutures intact 11/20/20 1000   Black (%), Wound Tissue Color 0 % 11/20/20 1000   Red (%), Wound Tissue Color 95 % 11/20/20 1000   Yellow (%), Wound Tissue Color 5 % 11/20/20 1000   Periwound Area Macerated 11/20/20 1000   Wound Edges Defined 11/20/20 1000   Wound Length (cm) 2.1 cm 11/20/20 1000   Wound Width (cm) 2 cm 11/20/20 1000   Wound Depth (cm) 0.2 cm 11/20/20 1000   Wound Volume (cm^3) 0.84 cm^3 11/20/20 1000   Wound Surface Area (cm^2) 4.2 cm^2 11/20/20 1000   Tunneling (depth (cm)/location) 0 11/20/20 1000   Undermining (depth (cm)/location) 0 11/20/20 1000   Care Soap and water;Sterile normal saline;Wound cleanser 11/20/20 1000   Dressing Applied;Other (see comments);Foam 11/20/20 1000   Periwound Care Other (see comments) 11/20/20 1000   Compression Tubular elasticized bandage 11/20/20 1000   Off Loading Football dressing 11/20/20 1000   Dressing Change Due 11/24/20 11/20/20 1000           Plan:              Greater than 50% of this visit spent on counseling and coordination of care.    Education about the prevention of limb loss.    Discussed wound healing cycle, skin integrity, ways to care for skin.Counseled patient on the effects of blood glucose on healing. He verbalizes understanding that it can increase the chances of delayed healing and this prolonged exposure leads to infection or progression of infection which subsequently can result in loss of limb.    Sutures removed    VAC applied in clinic today    Orders Placed This Encounter   Procedures    Change dressing     Cavilon/benzoin periwound. Gentian violet tight to periwound, wound vac (black:1), mextra short X1, jaleel foam short X4, football dressing (cast padding X3), single layer medium gradient tubigrip size E.        Follow up in about 4 days (around 11/24/2020) for wound care.

## 2020-11-24 ENCOUNTER — HOSPITAL ENCOUNTER (OUTPATIENT)
Dept: WOUND CARE | Facility: HOSPITAL | Age: 55
Discharge: HOME | End: 2020-11-24
Attending: PODIATRIST
Payer: COMMERCIAL

## 2020-11-24 VITALS
SYSTOLIC BLOOD PRESSURE: 134 MMHG | HEART RATE: 87 BPM | DIASTOLIC BLOOD PRESSURE: 78 MMHG | TEMPERATURE: 97 F | RESPIRATION RATE: 18 BRPM

## 2020-11-24 DIAGNOSIS — E11.621 DIABETIC ULCER OF LEFT FOOT ASSOCIATED WITH TYPE 2 DIABETES MELLITUS, WITH BONE INVOLVEMENT WITHOUT EVIDENCE OF NECROSIS: Primary | ICD-10-CM

## 2020-11-24 DIAGNOSIS — L97.526 DIABETIC ULCER OF LEFT FOOT ASSOCIATED WITH TYPE 2 DIABETES MELLITUS, WITH BONE INVOLVEMENT WITHOUT EVIDENCE OF NECROSIS: Primary | ICD-10-CM

## 2020-11-24 PROCEDURE — 97605 NEG PRS WND THER DME<=50SQCM: CPT

## 2020-11-24 RX ORDER — HYDRALAZINE HYDROCHLORIDE 100 MG/1
100 TABLET, FILM COATED ORAL 3 TIMES DAILY
Qty: 90 TABLET | Refills: 3 | Status: SHIPPED | OUTPATIENT
Start: 2020-11-24 | End: 2020-12-08 | Stop reason: SDUPTHER

## 2020-11-24 NOTE — PROGRESS NOTES
[facility]  CONSTANZA Newell  [id number]  Nurse Visit    Subjective:       Patient seen in clinic today.  Dressing changed as ordered.      Assessment:          Negative Pressure Wound Therapy  08/21/20 1007 Left lateral (Active)   08/21/20 1007   Side: Left   Orientation: lateral   Location: Foot   Additional Comments:    Location:    SDO Location:    NPWT Type Vacuum Therapy 11/24/20 1000   Therapy Setting NPWT Continuous therapy 11/24/20 1000   Pressure Setting NPWT 125 mmHg 11/24/20 1000   Therapy Interventions NPWT Canister changed;Dressing changed;Seal intact 11/24/20 1000   Sponges Inserted NPWT Black;1 11/24/20 1000   Sponges Removed NPWT Black;1 11/24/20 1000   General Output (mL) 25 11/24/20 1000            Incision/Site 07/17/20 1251 Left Foot (Active)   07/17/20 1251   Present Prior to Hospital Arrival?:    Side: Left   Location: Foot   Orientation:    Incision Type:    Closure Method: Other (see comments)   Additional Comments:  open wound .betadine soak in 4x4's, abd, ace, castpadding, surgical boot   Removal Indication and Assessment:    Wound Outcome:    Removal Indications:    Incision WDL ex 11/24/20 1000   Dressing Appearance Intact;Moist drainage 11/24/20 1000   Drainage Amount Moderate 11/24/20 1000   Drainage Characteristics/Odor Serosanguineous 11/24/20 1000   Appearance Pink;Red;White 11/24/20 1000   Black (%), Wound Tissue Color 0 % 11/24/20 1000   Red (%), Wound Tissue Color 100 % 11/24/20 1000   Yellow (%), Wound Tissue Color 0 % 11/24/20 1000   Periwound Area Macerated 11/24/20 1000   Wound Edges Defined 11/24/20 1000   Tunneling (depth (cm)/location) 0 11/24/20 1000   Undermining (depth (cm)/location) 0 11/24/20 1000   Care Soap and water;Sterile normal saline;Wound cleanser 11/24/20 1000   Dressing Applied;Other (see comments);Foam 11/24/20 1000   Periwound Care Other (see comments) 11/24/20 1000   Compression Tubular elasticized bandage 11/24/20 1000    Off Loading Football dressing 11/24/20 1000   Dressing Change Due 11/27/20 11/24/20 1000           Plan:     No orders of the defined types were placed in this encounter.          No follow-ups on file.

## 2020-11-27 ENCOUNTER — HOSPITAL ENCOUNTER (OUTPATIENT)
Dept: WOUND CARE | Facility: HOSPITAL | Age: 55
Discharge: HOME | End: 2020-11-27
Attending: PODIATRIST
Payer: COMMERCIAL

## 2020-11-27 ENCOUNTER — PATIENT MESSAGE (OUTPATIENT)
Dept: CARDIOLOGY | Facility: CLINIC | Age: 55
End: 2020-11-27

## 2020-11-27 VITALS — HEART RATE: 82 BPM | TEMPERATURE: 98 F | SYSTOLIC BLOOD PRESSURE: 164 MMHG | DIASTOLIC BLOOD PRESSURE: 81 MMHG

## 2020-11-27 DIAGNOSIS — E11.621 DIABETIC ULCER OF LEFT FOOT ASSOCIATED WITH TYPE 2 DIABETES MELLITUS, WITH BONE INVOLVEMENT WITHOUT EVIDENCE OF NECROSIS: Primary | ICD-10-CM

## 2020-11-27 DIAGNOSIS — Z79.4 TYPE 2 DIABETES MELLITUS WITH STAGE 4 CHRONIC KIDNEY DISEASE, WITH LONG-TERM CURRENT USE OF INSULIN: ICD-10-CM

## 2020-11-27 DIAGNOSIS — N18.4 TYPE 2 DIABETES MELLITUS WITH STAGE 4 CHRONIC KIDNEY DISEASE, WITH LONG-TERM CURRENT USE OF INSULIN: ICD-10-CM

## 2020-11-27 DIAGNOSIS — L97.526 DIABETIC ULCER OF LEFT FOOT ASSOCIATED WITH TYPE 2 DIABETES MELLITUS, WITH BONE INVOLVEMENT WITHOUT EVIDENCE OF NECROSIS: Primary | ICD-10-CM

## 2020-11-27 DIAGNOSIS — E11.22 TYPE 2 DIABETES MELLITUS WITH STAGE 4 CHRONIC KIDNEY DISEASE, WITH LONG-TERM CURRENT USE OF INSULIN: ICD-10-CM

## 2020-11-27 PROCEDURE — 97607 NEG PRS WND THR NDME<=50SQCM: CPT | Performed by: PODIATRIST

## 2020-11-27 PROCEDURE — 99214 PR OFFICE/OUTPT VISIT, EST, LEVL IV, 30-39 MIN: ICD-10-PCS | Mod: ,,, | Performed by: PODIATRIST

## 2020-11-27 PROCEDURE — 99214 OFFICE O/P EST MOD 30 MIN: CPT | Mod: ,,, | Performed by: PODIATRIST

## 2020-11-27 NOTE — PROGRESS NOTES
[facility]                Progress Note    Subjective:       Patient ID: Catalino Mcfadden is a 55 y.o. male.    Chief Complaint: No chief complaint on file.    Patient ambulated to exam bed without assistance. No pain reported. Left Plantar Foot Diabetic Ulcer measuring slightly deeper this week around tendon exposure but with smaller width. Drainage controlled. 10 cc of drainage in NPWT cannister.  Patient states that he always wears his CAM boot (never takes it off). Instructed patient to avoid walking this week as much as possible. Will start endoform collagen to wound bed with NPWT this week. Will re-order Neox for application next week.      Review of Systems   Constitutional: Negative for appetite change, chills and fever.   Respiratory: Negative for cough, shortness of breath and wheezing.    Cardiovascular: Positive for leg swelling. Negative for chest pain.   Gastrointestinal: Negative for diarrhea, nausea and vomiting.   Musculoskeletal: Positive for arthralgias, joint swelling and myalgias.   Skin: Positive for wound.   Neurological: Positive for numbness and headaches. Negative for weakness.        + paresthesia          Objective:        Physical Exam  Vitals signs and nursing note reviewed.   Constitutional:       General: He is not in acute distress.     Appearance: He is not toxic-appearing or diaphoretic.      Comments: Pt. is well-developed, well-nourished, appears stated age, in no acute distress, alert and oriented x 3. No evidence of depression, anxiety, or agitation. Calm, cooperative, and communicative. Appropriate interactions and affect.   Cardiovascular:      Pulses:           Dorsalis pedis pulses are 2+ on the right side and 2+ on the left side.        Posterior tibial pulses are 1+ on the right side and 1+ on the left side.      Comments: There is decreased digital hair. Skin is atrophic, slightly hyperpigmented  Pulmonary:      Effort: No  respiratory distress.   Musculoskeletal:      Right ankle: He exhibits normal range of motion and no swelling. No tenderness. No lateral malleolus, no medial malleolus, no AITFL, no CF ligament and no posterior TFL tenderness found. Achilles tendon exhibits no pain, no defect and normal Hilliard's test results.      Left ankle: He exhibits normal range of motion and no swelling. No tenderness. No lateral malleolus, no medial malleolus, no AITFL, no CF ligament and no posterior TFL tenderness found. Achilles tendon exhibits no pain, no defect and normal Hilliard's test results.      Right foot: No tenderness or bony tenderness.      Left foot: No tenderness or bony tenderness.      Comments: Decreased stride, station of gait.  apropulsive toe off.  Increased angle and base of gait.    There is equinus deformity bilateral with decreased dorsiflexion at the ankle joint bilateral. No tenderness with compression of heel. Negative tinels sign. Gait analysis reveals excessive pronation through midstance and propulsion with early heel off.     Patient has hammertoes of digits 2-5 bilateral partially reducible     Decreased first MPJ range of motion both weightbearing and nonweightbearing, no crepitus observed the first MP joint, + dorsal flag sign.     Visible and palpable bunion without pain at dorsomedial 1st metatarsal head right and left.  Hallux abducted right and left partially reducible, tracks laterally without being track bound.  No ecchymosis, erythema, edema, or cardinal signs infection or signs of trauma same foot.    Fat pad atrophy to heels and met heads bilateral    Plantarflexed 1st ray RLE   Lymphadenopathy:      Comments: No lymphatic streaking    Negative lymphadenopathy bilateral popliteal fossa and tarsal tunnel.     Skin:     General: Skin is warm and dry.      Coloration: Skin is not pale.      Findings: Erythema and lesion (see wound description below) present. No abrasion, ecchymosis, laceration or  rash.      Nails: There is no clubbing.        Comments: Toenails 1-5 bilaterally discolored/yellowed, dystrophic, brittle with subungual debris.    Neurological:      Sensory: Sensory deficit present.      Comments:  [place]-Day[place] 5.07 monofilamant testing is diminished Kp feet. Decreased/absent vibratory sensation bilateral feet to 128Hz tuning fork.     Paresthesias, and hyperesthesia bilateral feet with no clearly identified trigger or source.           Psychiatric:         Attention and Perception: He is attentive.         Mood and Affect: Mood is not anxious. Affect is not inappropriate.         Speech: He is communicative. Speech is not slurred.         Behavior: Behavior is not combative.         Vitals:    11/27/20 1011   BP: (!) 164/81   Pulse: 82   Temp: 98.1 °F (36.7 °C)       Assessment:           ICD-10-CM ICD-9-CM   1. Acute osteomyelitis of metatarsal bone of left foot  M86.172 730.07   2. 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  E11.621 250.80    L97.526 707.15   3. Type 2 diabetes mellitus with stage 4 chronic kidney disease, with long-term current use of insulin  E11.22 250.40    N18.4 585.4    Z79.4 V58.67            Negative Pressure Wound Therapy  08/21/20 1007 Left lateral (Active)   08/21/20 1007   Side: Left   Orientation: lateral   Location: Foot   Additional Comments:    Location:    SDO Location:    NPWT Type Vacuum Therapy 11/27/20 1000   Therapy Setting NPWT Continuous therapy 11/27/20 1000   Pressure Setting NPWT 125 mmHg 11/27/20 1000   Sponges Inserted NPWT Black;1 11/27/20 1000   Sponges Removed NPWT Black;1 11/27/20 1000   General Output (mL) 10 11/27/20 1000            Incision/Site 07/17/20 1251 Left Foot (Active)   07/17/20 1251   Present Prior to Hospital Arrival?:    Side: Left   Location: Foot   Orientation:    Incision Type:    Closure Method: Other (see comments)   Additional Comments:   open wound .betadine soak in 4x4's, abd, ace, castpadding, surgical boot   Removal Indication and Assessment:    Wound Outcome:    Removal Indications:    Incision WDL ex 11/27/20 1000   Dressing Appearance Moist drainage 11/27/20 1000   Drainage Amount Small 11/27/20 1000   Drainage Characteristics/Odor Sanguineous 11/27/20 1000   Appearance Red;Granulating;Tendon 11/27/20 1000   Red (%), Wound Tissue Color 100 % 11/27/20 1000   Periwound Area Intact 11/27/20 1000   Wound Edges Open 11/27/20 1000   Wound Length (cm) 2.2 cm 11/27/20 1000   Wound Width (cm) 1.6 cm 11/27/20 1000   Wound Depth (cm) 0.3 cm 11/27/20 1000   Wound Volume (cm^3) 1.06 cm^3 11/27/20 1000   Wound Surface Area (cm^2) 3.52 cm^2 11/27/20 1000   Care Cleansed with:;Soap and water;Sterile normal saline 11/27/20 1000   Dressing Changed 11/27/20 1000   Compression Tubular elasticized bandage 11/27/20 1000   Off Loading Football dressing 11/27/20 1000           Plan:                Greater than 50% of this visit spent on counseling and coordination of care.    Education about the prevention of limb loss.    Discussed wound healing cycle, skin integrity, ways to care for skin.Counseled patient on the effects of blood glucose on healing. He verbalizes understanding that it can increase the chances of delayed healing and this prolonged exposure leads to infection or progression of infection which subsequently can result in loss of limb.    Bone palpated beneath abductor digiti minimi on today's encounter.  Will apply extra offloading padding    VAC applied in clinic today      Orders Placed This Encounter   Procedures    Change dressing     Irrigate with saline. Gentian violet periwound. Endoform to wound bed. Cover with black sponge NPWT at 125 mmHg low continuous. Change during nurse visit.    Change dressing     Outer dressing: Football dressing with foam offloading and tubigrip medium to LLE. Change during nurse visit.        Follow up in about 4  days (around 12/1/2020), or nurse visit.

## 2020-12-01 ENCOUNTER — HOSPITAL ENCOUNTER (OUTPATIENT)
Dept: WOUND CARE | Facility: HOSPITAL | Age: 55
Discharge: HOME | End: 2020-12-01
Attending: FAMILY MEDICINE
Payer: COMMERCIAL

## 2020-12-01 VITALS — HEART RATE: 83 BPM | TEMPERATURE: 98 F | DIASTOLIC BLOOD PRESSURE: 80 MMHG | SYSTOLIC BLOOD PRESSURE: 164 MMHG

## 2020-12-01 DIAGNOSIS — E11.621 DIABETIC ULCER OF LEFT FOOT ASSOCIATED WITH TYPE 2 DIABETES MELLITUS, WITH BONE INVOLVEMENT WITHOUT EVIDENCE OF NECROSIS: Primary | ICD-10-CM

## 2020-12-01 DIAGNOSIS — L97.526 DIABETIC ULCER OF LEFT FOOT ASSOCIATED WITH TYPE 2 DIABETES MELLITUS, WITH BONE INVOLVEMENT WITHOUT EVIDENCE OF NECROSIS: Primary | ICD-10-CM

## 2020-12-01 PROCEDURE — 97605 NEG PRS WND THER DME<=50SQCM: CPT

## 2020-12-01 NOTE — PROGRESS NOTES
[facility]  [address]  [id number]  Nurse Visit    Subjective:       Patient seen in clinic today.  Dressing changed as ordered. Offloading foam remained in place. Endoform appears to be filling in deficit over tendon. Patient continuing to offload most of the day. States he tries to follow his diabetic diet and keep his BP under control.       Assessment:          Negative Pressure Wound Therapy  08/21/20 1007 Left lateral (Active)   08/21/20 1007   Side: Left   Orientation: lateral   Location: Foot   Additional Comments:    Location:    SDO Location:    NPWT Type Vacuum Therapy 12/01/20 1500   Therapy Setting NPWT Continuous therapy 12/01/20 1500   Pressure Setting NPWT 125 mmHg 12/01/20 1500   Therapy Interventions NPWT Canister changed;Dressing changed;Seal intact 12/01/20 1500   Sponges Inserted NPWT Black;1 12/01/20 1500   Sponges Removed NPWT Black;1 12/01/20 1500   General Output (mL) 20 12/01/20 1500            Incision/Site 07/17/20 1251 Left Foot (Active)   07/17/20 1251   Present Prior to Hospital Arrival?:    Side: Left   Location: Foot   Orientation:    Incision Type:    Closure Method: Other (see comments)   Additional Comments:  open wound .betadine soak in 4x4's, abd, ace, castpadding, surgical boot   Removal Indication and Assessment:    Wound Outcome:    Removal Indications:    Incision WDL ex 12/01/20 1500   Drainage Characteristics/Odor Serosanguineous 12/01/20 1500   Appearance Red;Granulating;Tendon 12/01/20 1500   Periwound Area Pale white 12/01/20 1500   Wound Edges Open 12/01/20 1500   Care Cleansed with:;Soap and water;Sterile normal saline 12/01/20 1500   Dressing Changed;Foam;Cast padding;Tubular bandage 12/01/20 1500   Compression Tubular elasticized bandage 12/01/20 1500           Plan:     No orders of the defined types were placed in this encounter.          Follow up in about 3 days (around 12/4/2020) for wound care.

## 2020-12-04 ENCOUNTER — HOSPITAL ENCOUNTER (OUTPATIENT)
Dept: WOUND CARE | Facility: HOSPITAL | Age: 55
Discharge: HOME | End: 2020-12-04
Attending: PODIATRIST
Payer: COMMERCIAL

## 2020-12-04 DIAGNOSIS — L97.426 DIABETIC ULCER OF LEFT MIDFOOT ASSOCIATED WITH TYPE 2 DIABETES MELLITUS, WITH BONE INVOLVEMENT WITHOUT EVIDENCE OF NECROSIS: Primary | ICD-10-CM

## 2020-12-04 DIAGNOSIS — E11.621 DIABETIC ULCER OF LEFT MIDFOOT ASSOCIATED WITH TYPE 2 DIABETES MELLITUS, WITH BONE INVOLVEMENT WITHOUT EVIDENCE OF NECROSIS: Primary | ICD-10-CM

## 2020-12-04 PROCEDURE — 15275 PR SKIN SUB GRAFT FACE/NK/HF/G UP TO 100 SQCM: ICD-10-PCS | Mod: ,,, | Performed by: PODIATRIST

## 2020-12-04 PROCEDURE — 29581 APPL MULTLAYER CMPRN SYS LEG: CPT

## 2020-12-04 PROCEDURE — 15275 SKIN SUB GRAFT FACE/NK/HF/G: CPT | Mod: ,,, | Performed by: PODIATRIST

## 2020-12-04 NOTE — PROGRESS NOTES
[facility]                Progress Note    Subjective:       Patient ID: Catalino Mcfadden is a 55 y.o. male.    Chief Complaint: No chief complaint on file.    Patient ambulated to exam bed without assistance. No pain reported. Left Plantar Foot wound measuring smaller this week and new granulation over previous tendon exposure. Cannot palpate bone. Continuing to use offloading foam plus instructing patient to continue to offload at home. Patient asked if he could get extra padding on his dressing today because he planned to go shopping at [employer]. Patient explicitly instructed not to go walking around for the next 2 days as he just had new Neox graft placed today and he needs to aggressively offload.       Review of Systems   Constitutional: Negative for appetite change, chills and fever.   Respiratory: Negative for cough, shortness of breath and wheezing.    Cardiovascular: Positive for leg swelling. Negative for chest pain.   Gastrointestinal: Negative for diarrhea, nausea and vomiting.   Musculoskeletal: Positive for arthralgias, joint swelling and myalgias.   Skin: Positive for wound.   Neurological: Positive for numbness and headaches. Negative for weakness.        + paresthesia          Objective:        Physical Exam  Vitals signs and nursing note reviewed.   Constitutional:       General: He is not in acute distress.     Appearance: He is not toxic-appearing or diaphoretic.      Comments: Pt. is well-developed, well-nourished, appears stated age, in no acute distress, alert and oriented x 3. No evidence of depression, anxiety, or agitation. Calm, cooperative, and communicative. Appropriate interactions and affect.   Cardiovascular:      Pulses:           Dorsalis pedis pulses are 2+ on the right side and 2+ on the left side.        Posterior tibial pulses are 1+ on the right side and 1+ on the left side.      Comments: There is decreased digital hair. Skin is  atrophic, slightly hyperpigmented  Pulmonary:      Effort: No respiratory distress.   Musculoskeletal:      Right ankle: He exhibits normal range of motion and no swelling. No tenderness. No lateral malleolus, no medial malleolus, no AITFL, no CF ligament and no posterior TFL tenderness found. Achilles tendon exhibits no pain, no defect and normal Hilliard's test results.      Left ankle: He exhibits normal range of motion and no swelling. No tenderness. No lateral malleolus, no medial malleolus, no AITFL, no CF ligament and no posterior TFL tenderness found. Achilles tendon exhibits no pain, no defect and normal Hilliard's test results.      Right foot: No tenderness or bony tenderness.      Left foot: No tenderness or bony tenderness.      Comments: Decreased stride, station of gait.  apropulsive toe off.  Increased angle and base of gait.    There is equinus deformity bilateral with decreased dorsiflexion at the ankle joint bilateral. No tenderness with compression of heel. Negative tinels sign. Gait analysis reveals excessive pronation through midstance and propulsion with early heel off.     Patient has hammertoes of digits 2-5 bilateral partially reducible     Decreased first MPJ range of motion both weightbearing and nonweightbearing, no crepitus observed the first MP joint, + dorsal flag sign.     Visible and palpable bunion without pain at dorsomedial 1st metatarsal head right and left.  Hallux abducted right and left partially reducible, tracks laterally without being track bound.  No ecchymosis, erythema, edema, or cardinal signs infection or signs of trauma same foot.    Fat pad atrophy to heels and met heads bilateral    Plantarflexed 1st ray RLE   Lymphadenopathy:      Comments: No lymphatic streaking    Negative lymphadenopathy bilateral popliteal fossa and tarsal tunnel.     Skin:     General: Skin is warm and dry.      Coloration: Skin is not pale.      Findings: Erythema and lesion (see wound  description below) present. No abrasion, ecchymosis, laceration or rash.      Nails: There is no clubbing.        Comments: Toenails 1-5 bilaterally discolored/yellowed, dystrophic, brittle with subungual debris.    Neurological:      Sensory: Sensory deficit present.      Comments:  [place]-Day[place] 5.07 monofilamant testing is diminished Kp feet. Decreased/absent vibratory sensation bilateral feet to 128Hz tuning fork.     Paresthesias, and hyperesthesia bilateral feet with no clearly identified trigger or source.           Psychiatric:         Attention and Perception: He is attentive.         Mood and Affect: Mood is not anxious. Affect is not inappropriate.         Speech: He is communicative. Speech is not slurred.         Behavior: Behavior is not combative.         There were no vitals filed for this visit.    Assessment:           ICD-10-CM ICD-9-CM   1. Diabetic ulcer of left midfoot associated with type 2 diabetes mellitus, with bone involvement without evidence of necrosis  E11.621 250.80    L97.426 707.14            Incision/Site 07/17/20 1251 Left Foot (Active)   07/17/20 1251   Present Prior to Hospital Arrival?:    Side: Left   Location: Foot   Orientation:    Incision Type:    Closure Method: Other (see comments)   Additional Comments:  open wound .betadine soak in 4x4's, abd, ace, castpadding, surgical boot   Removal Indication and Assessment:    Wound Outcome:    Removal Indications:    Wound Image   12/04/20 1000   Incision WDL ex 12/04/20 1000   Dressing Appearance Moist drainage 12/04/20 1000   Drainage Amount Small 12/04/20 1000   Drainage Characteristics/Odor Serosanguineous 12/04/20 1000   Appearance Red;Granulating 12/04/20 1000   Red (%), Wound Tissue Color 100 % 12/04/20 1000   Periwound Area Intact 12/04/20 1000   Wound Edges Open 12/04/20 1000   Wound Length (cm) 1.7 cm 12/04/20 1000   Wound Width (cm) 1.2 cm 12/04/20 1000   Wound Depth (cm) 0.4 cm 12/04/20 1000   Wound Volume (cm^3)  0.82 cm^3 12/04/20 1000   Wound Surface Area (cm^2) 2.04 cm^2 12/04/20 1000   Care Cleansed with:;Soap and water;Sterile normal saline 12/04/20 1000   Dressing Changed 12/04/20 1000   Compression Two layer compression 12/04/20 1000   Off Loading Football dressing 12/04/20 1000           Plan:                  Wound is measuring smaller. Debridement performed. Neox (graft) with sutures applied.  orders on hold due to graft. Pt will come for a nurse visit Tuesday. Discussed elevating as much as possible.      NEOX OP REPORT    SURGEON: Aviva Martinez DPM  ASSITANT: None  PRE-OP DX: Ulceration secondary to diabetes mellitus and peripheral neuropathy with sluggish response to prior treatment.  POST-OP DX: same.  ANESTHESIA: Pt. has loss of sensation and therefore no anesthesia was required.  HEMOSTASIS: pressure  EBL: less than 5cc.    Time Out performed to verify patient and site and consent obtained.    Procedure: The patient was seen in the wound clinic room and placed in supine position on the treatment table.  Attention was directed to the ulcer which was located on the left plantar lateral foot, where an ulceration measuring  1.7 x 1.2 x 0.4 cm is noted.  The ulceration was had a  mild callused rim with periwound maceration.  No acute signs of infection were noted.  The wound is nontender.  Utilizing a 3 mm curette, I debrided 100% of the wound full-thickness through subcutaneous tissue to excise viable and nonviable tissue outside the wound margins.  Patient tolerated well.  The wound was flushed with sterile saline.  There was minimal bleeding with debridement which was well controlled with direct pressure.  A 3 x 3 cm NEOX sheet was then inspected and noted to be in acceptable.  It was then removed sterilely .  The sheet was then fenestrated with the scalpel and then cut and sized and shaped to the wound.  I utilized the entirety with overlapping edges against the wound.  The NEOX was adhered down with saline  soaked cotton swabs and then secured in place 3-0 prolene and covered with adaptic touch non-adherent layer followed by football dressing in lieu of VAC. Will apply VAC on next encounter.  The patient having tolerated the procedure well left the clinic with all vital signs stable and vascular status intact to all digits of both feet.     Short-term goals including promoting granulation and epithelialization of the wound. Long term goals include maintaining a healed wound with appropriate offloading and good medical management per internist.    Offloading: CAM boot      No orders of the defined types were placed in this encounter.       No follow-ups on file.

## 2020-12-08 ENCOUNTER — HOSPITAL ENCOUNTER (OUTPATIENT)
Dept: WOUND CARE | Facility: HOSPITAL | Age: 55
Discharge: HOME | End: 2020-12-08
Attending: FAMILY MEDICINE
Payer: COMMERCIAL

## 2020-12-08 VITALS — DIASTOLIC BLOOD PRESSURE: 81 MMHG | TEMPERATURE: 98 F | HEART RATE: 87 BPM | SYSTOLIC BLOOD PRESSURE: 142 MMHG

## 2020-12-08 DIAGNOSIS — E11.621 DIABETIC ULCER OF LEFT MIDFOOT ASSOCIATED WITH TYPE 2 DIABETES MELLITUS, WITH BONE INVOLVEMENT WITHOUT EVIDENCE OF NECROSIS: Primary | ICD-10-CM

## 2020-12-08 DIAGNOSIS — L97.426 DIABETIC ULCER OF LEFT MIDFOOT ASSOCIATED WITH TYPE 2 DIABETES MELLITUS, WITH BONE INVOLVEMENT WITHOUT EVIDENCE OF NECROSIS: Primary | ICD-10-CM

## 2020-12-08 PROCEDURE — 29581 APPL MULTLAYER CMPRN SYS LEG: CPT

## 2020-12-08 RX ORDER — HYDRALAZINE HYDROCHLORIDE 100 MG/1
100 TABLET, FILM COATED ORAL 3 TIMES DAILY
Qty: 90 TABLET | Refills: 3 | Status: SHIPPED | OUTPATIENT
Start: 2020-12-08 | End: 2021-03-03

## 2020-12-08 RX ORDER — HYDRALAZINE HYDROCHLORIDE 100 MG/1
100 TABLET, FILM COATED ORAL EVERY 8 HOURS
Qty: 90 TABLET | Refills: 11 | Status: SHIPPED | OUTPATIENT
Start: 2020-12-08 | End: 2021-05-25 | Stop reason: SDUPTHER

## 2020-12-08 NOTE — PROGRESS NOTES
[facility] [address]  [id number]  Nurse Visit    Subjective:       Patient seen in clinic today.  Dressing changed as ordered. Patient ambulated to exam bed without assistance. No pain reported. Swelling and drainage controlled to LLE. Changed outer dressing only. Adaptic touch left in place and steristrips reinforced prior to changing outer dressing: offloading felt, fluffed gauze, marycarmen foam long x 2, marycarmen foam short x 1, cast padding x 3 foobtall, calamine coflex.       Assessment:          Negative Pressure Wound Therapy  08/21/20 1007 Left lateral (Active)   08/21/20 1007   Side: Left   Orientation: lateral   Location: Foot   Additional Comments:    Location:    SDO Location:    Therapy Setting NPWT Vacuum off 12/08/20 1500            Incision/Site 07/17/20 1251 Left Foot (Active)   07/17/20 1251   Present Prior to Hospital Arrival?:    Side: Left   Location: Foot   Orientation:    Incision Type:    Closure Method: Other (see comments)   Additional Comments:  open wound .betadine soak in 4x4's, abd, ace, castpadding, surgical boot   Removal Indication and Assessment:    Wound Outcome:    Removal Indications:    Incision WDL ex 12/08/20 1500   Dressing Appearance Moist drainage 12/08/20 1500   Drainage Amount Small 12/08/20 1500   Drainage Characteristics/Odor Yellow;No odor 12/08/20 1500   Appearance Dressing in place, unable to visualize 12/08/20 1500   Dressing Changed 12/08/20 1500   Compression Two layer compression 12/08/20 1500   Off Loading Football dressing 12/08/20 1500           Plan:     No orders of the defined types were placed in this encounter.          Follow up in about 3 days (around 12/11/2020) for wound care.

## 2020-12-11 ENCOUNTER — HOSPITAL ENCOUNTER (OUTPATIENT)
Dept: WOUND CARE | Facility: HOSPITAL | Age: 55
Discharge: HOME | End: 2020-12-11
Attending: PODIATRIST
Payer: COMMERCIAL

## 2020-12-11 VITALS
DIASTOLIC BLOOD PRESSURE: 78 MMHG | RESPIRATION RATE: 17 BRPM | HEART RATE: 92 BPM | TEMPERATURE: 98 F | SYSTOLIC BLOOD PRESSURE: 165 MMHG

## 2020-12-11 DIAGNOSIS — L97.526 DIABETIC ULCER OF LEFT FOOT ASSOCIATED WITH TYPE 2 DIABETES MELLITUS, WITH BONE INVOLVEMENT WITHOUT EVIDENCE OF NECROSIS: Primary | ICD-10-CM

## 2020-12-11 DIAGNOSIS — E11.621 DIABETIC ULCER OF LEFT FOOT ASSOCIATED WITH TYPE 2 DIABETES MELLITUS, WITH BONE INVOLVEMENT WITHOUT EVIDENCE OF NECROSIS: Primary | ICD-10-CM

## 2020-12-11 PROCEDURE — 99213 PR OFFICE/OUTPT VISIT, EST, LEVL III, 20-29 MIN: ICD-10-PCS | Mod: ,,, | Performed by: PODIATRIST

## 2020-12-11 PROCEDURE — 99213 OFFICE O/P EST LOW 20 MIN: CPT | Mod: ,,, | Performed by: PODIATRIST

## 2020-12-11 PROCEDURE — 29581 APPL MULTLAYER CMPRN SYS LEG: CPT | Performed by: PODIATRIST

## 2020-12-11 NOTE — PROGRESS NOTES
[facility]                Progress Note    Subjective:       Patient ID: Catalino Mcfadden is a 55 y.o. male.    Chief Complaint: Non-healing Wound Follow Up and Diabetic Foot Ulcer    12/11/2020: F/U visit. Pt ambulated with no assistance. No fever. Denies pain. Wound is improving and measuring smaller. Red granular tissue. Macerated periwound. Graft still in place with 3 sutures. Applied lucas fenestrated and continue same outer dressing. Discussed elevating/ offloading as much as possible. Cancelling wound vac. Pt will send back the vac to [facility].      Review of Systems   Constitutional: Negative for appetite change, chills and fever.   Respiratory: Negative for cough, shortness of breath and wheezing.    Cardiovascular: Positive for leg swelling. Negative for chest pain.   Gastrointestinal: Negative for diarrhea, nausea and vomiting.   Musculoskeletal: Positive for arthralgias, joint swelling and myalgias.   Skin: Positive for wound.   Neurological: Positive for numbness and headaches. Negative for weakness.        + paresthesia          Objective:        Physical Exam  Vitals signs and nursing note reviewed.   Constitutional:       General: He is not in acute distress.     Appearance: He is not toxic-appearing or diaphoretic.      Comments: Pt. is well-developed, well-nourished, appears stated age, in no acute distress, alert and oriented x 3. No evidence of depression, anxiety, or agitation. Calm, cooperative, and communicative. Appropriate interactions and affect.   Cardiovascular:      Pulses:           Dorsalis pedis pulses are 2+ on the right side and 2+ on the left side.        Posterior tibial pulses are 1+ on the right side and 1+ on the left side.      Comments: There is decreased digital hair. Skin is atrophic, slightly hyperpigmented  Pulmonary:      Effort: No respiratory distress.   Musculoskeletal:      Right ankle: He exhibits normal range of motion and no  swelling. No tenderness. No lateral malleolus, no medial malleolus, no AITFL, no CF ligament and no posterior TFL tenderness found. Achilles tendon exhibits no pain, no defect and normal Hilliard's test results.      Left ankle: He exhibits normal range of motion and no swelling. No tenderness. No lateral malleolus, no medial malleolus, no AITFL, no CF ligament and no posterior TFL tenderness found. Achilles tendon exhibits no pain, no defect and normal Hilliard's test results.      Right foot: No tenderness or bony tenderness.      Left foot: No tenderness or bony tenderness.      Comments: Decreased stride, station of gait.  apropulsive toe off.  Increased angle and base of gait.    There is equinus deformity bilateral with decreased dorsiflexion at the ankle joint bilateral. No tenderness with compression of heel. Negative tinels sign. Gait analysis reveals excessive pronation through midstance and propulsion with early heel off.     Patient has hammertoes of digits 2-5 bilateral partially reducible     Decreased first MPJ range of motion both weightbearing and nonweightbearing, no crepitus observed the first MP joint, + dorsal flag sign.     Visible and palpable bunion without pain at dorsomedial 1st metatarsal head right and left.  Hallux abducted right and left partially reducible, tracks laterally without being track bound.  No ecchymosis, erythema, edema, or cardinal signs infection or signs of trauma same foot.    Fat pad atrophy to heels and met heads bilateral    Plantarflexed 1st ray RLE   Lymphadenopathy:      Comments: No lymphatic streaking    Negative lymphadenopathy bilateral popliteal fossa and tarsal tunnel.     Skin:     General: Skin is warm and dry.      Coloration: Skin is not pale.      Findings: Erythema and lesion (see wound description below) present. No abrasion, ecchymosis, laceration or rash.      Nails: There is no clubbing.        Comments: Toenails 1-5 bilaterally  discolored/yellowed, dystrophic, brittle with subungual debris.    Neurological:      Sensory: Sensory deficit present.      Comments:  [place] 5.07 monofilamant testing is diminished Kp feet. Decreased/absent vibratory sensation bilateral feet to 128Hz tuning fork.     Paresthesias, and hyperesthesia bilateral feet with no clearly identified trigger or source.           Psychiatric:         Attention and Perception: He is attentive.         Mood and Affect: Mood is not anxious. Affect is not inappropriate.         Speech: He is communicative. Speech is not slurred.         Behavior: Behavior is not combative.         Vitals:    12/11/20 1106   BP: (!) 165/78   Pulse: 92   Resp: 17   Temp: 97.7 °F (36.5 °C)       Assessment:           ICD-10-CM ICD-9-CM   1. 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  E11.621 250.80    L97.526 707.15            Incision/Site 07/17/20 1251 Left Foot (Active)   07/17/20 1251   Present Prior to Hospital Arrival?:    Side: Left   Location: Foot   Orientation:    Incision Type:    Closure Method: Other (see comments)   Additional Comments:  open wound .betadine soak in 4x4's, abd, ace, castpadding, surgical boot   Removal Indication and Assessment:    Wound Outcome:    Removal Indications:    Wound Image   12/11/20 1100   Incision WDL ex 12/11/20 1100   Dressing Appearance Intact;Moist drainage 12/11/20 1100   Drainage Amount Moderate 12/11/20 1100   Drainage Characteristics/Odor Yellow;Serous 12/11/20 1100   Appearance Red;Pink;White;Sutures intact 12/11/20 1100   Black (%), Wound Tissue Color 0 % 12/11/20 1100   Red (%), Wound Tissue Color 100 % 12/11/20 1100   Yellow (%), Wound Tissue Color 0 % 12/11/20 1100   Periwound Area Macerated 12/11/20 1100   Wound Edges Open 12/11/20 1100   Wound Length (cm) 1.2 cm 12/11/20 1100   Wound Width (cm) 1.2 cm 12/11/20 1100   Wound Depth (cm) 0.3 cm 12/11/20  1100   Wound Volume (cm^3) 0.43 cm^3 12/11/20 1100   Wound Surface Area (cm^2) 1.44 cm^2 12/11/20 1100   Tunneling (depth (cm)/location) 0 12/11/20 1100   Undermining (depth (cm)/location) 0 12/11/20 1100   Care Soap and water;Sterile normal saline;Wound cleanser 12/11/20 1100   Dressing Applied;Collagen;Other (see comments);Foam 12/11/20 1100   Compression Two layer compression 12/11/20 1100   Off Loading Football dressing 12/11/20 1100   Dressing Change Due 12/16/20 12/11/20 1100           Plan:            Greater than 50% of this visit spent on counseling and coordination of care.    Education about the prevention of limb loss.    Discussed wound healing cycle, skin integrity, ways to care for skin.Counseled patient on the effects of blood glucose on healing. He verbalizes understanding that it can increase the chances of delayed healing and this prolonged exposure leads to infection or progression of infection which subsequently can result in loss of limb.    Base is granular with neox in place.       Dimensions improved.    Will discontinue VAC.  Patient strongly encouraged to avoid excess weight-bearing.  Orders Placed This Encounter   Procedures    Change dressing     Adaptic touch, custom eran pad with hole, lucas fenestrated, double layer drawtex, mextra short X1, jaleel foam long X2 perpendicular, football dressing (cast padding X3), coflex calamine        No follow-ups on file.

## 2020-12-16 ENCOUNTER — HOSPITAL ENCOUNTER (OUTPATIENT)
Dept: WOUND CARE | Facility: HOSPITAL | Age: 55
Discharge: HOME | End: 2020-12-16
Attending: FAMILY MEDICINE
Payer: COMMERCIAL

## 2020-12-16 VITALS
TEMPERATURE: 97 F | RESPIRATION RATE: 17 BRPM | HEART RATE: 90 BPM | SYSTOLIC BLOOD PRESSURE: 185 MMHG | DIASTOLIC BLOOD PRESSURE: 90 MMHG

## 2020-12-16 DIAGNOSIS — E11.621 DIABETIC ULCER OF LEFT FOOT ASSOCIATED WITH TYPE 2 DIABETES MELLITUS, WITH BONE INVOLVEMENT WITHOUT EVIDENCE OF NECROSIS: Primary | ICD-10-CM

## 2020-12-16 DIAGNOSIS — L97.526 DIABETIC ULCER OF LEFT FOOT ASSOCIATED WITH TYPE 2 DIABETES MELLITUS, WITH BONE INVOLVEMENT WITHOUT EVIDENCE OF NECROSIS: Primary | ICD-10-CM

## 2020-12-16 PROCEDURE — 29581 APPL MULTLAYER CMPRN SYS LEG: CPT

## 2020-12-16 NOTE — PROGRESS NOTES
[facility] [address]  [address]  Nurse Visit    Subjective:       Patient seen in clinic today.  Dressing changed as ordered.      Assessment:          Incision/Site 07/17/20 1251 Left Foot (Active)   07/17/20 1251   Present Prior to Hospital Arrival?:    Side: Left   Location: Foot   Orientation:    Incision Type:    Closure Method: Other (see comments)   Additional Comments:  open wound .betadine soak in 4x4's, abd, ace, castpadding, surgical boot   Removal Indication and Assessment:    Wound Outcome:    Removal Indications:    Incision WDL ex 12/16/20 1400   Dressing Appearance Intact;Moist drainage 12/16/20 1400   Drainage Amount Small 12/16/20 1400   Drainage Characteristics/Odor Serous;Yellow 12/16/20 1400   Appearance Red;Pink;White;Sutures intact 12/16/20 1400   Black (%), Wound Tissue Color 0 % 12/16/20 1400   Red (%), Wound Tissue Color 100 % 12/16/20 1400   Yellow (%), Wound Tissue Color 0 % 12/16/20 1400   Periwound Area Macerated 12/16/20 1400   Wound Edges Open 12/16/20 1400   Tunneling (depth (cm)/location) 0 12/16/20 1400   Undermining (depth (cm)/location) 0 12/16/20 1400   Care Soap and water;Sterile normal saline;Wound cleanser 12/16/20 1400   Dressing Applied;Collagen;Other (see comments);Foam 12/16/20 1400   Compression Two layer compression 12/16/20 1400   Off Loading Football dressing 12/16/20 1400   Dressing Change Due 12/18/20 12/16/20 1400           Plan:     No orders of the defined types were placed in this encounter.          No follow-ups on file.

## 2020-12-18 ENCOUNTER — HOSPITAL ENCOUNTER (OUTPATIENT)
Dept: WOUND CARE | Facility: HOSPITAL | Age: 55
Discharge: HOME | End: 2020-12-18
Attending: FAMILY MEDICINE
Payer: COMMERCIAL

## 2020-12-18 VITALS
SYSTOLIC BLOOD PRESSURE: 136 MMHG | RESPIRATION RATE: 18 BRPM | HEART RATE: 83 BPM | TEMPERATURE: 97 F | DIASTOLIC BLOOD PRESSURE: 80 MMHG

## 2020-12-18 DIAGNOSIS — L97.526 DIABETIC ULCER OF LEFT FOOT ASSOCIATED WITH TYPE 2 DIABETES MELLITUS, WITH BONE INVOLVEMENT WITHOUT EVIDENCE OF NECROSIS: Primary | ICD-10-CM

## 2020-12-18 DIAGNOSIS — E11.621 DIABETIC ULCER OF LEFT FOOT ASSOCIATED WITH TYPE 2 DIABETES MELLITUS, WITH BONE INVOLVEMENT WITHOUT EVIDENCE OF NECROSIS: Primary | ICD-10-CM

## 2020-12-18 PROCEDURE — 11042 DBRDMT SUBQ TIS 1ST 20SQCM/<: CPT | Mod: ,,, | Performed by: FAMILY MEDICINE

## 2020-12-18 PROCEDURE — 99214 OFFICE O/P EST MOD 30 MIN: CPT | Mod: 25,,, | Performed by: FAMILY MEDICINE

## 2020-12-18 PROCEDURE — 27201912 HC WOUND CARE DEBRIDEMENT SUPPLIES: Performed by: FAMILY MEDICINE

## 2020-12-18 PROCEDURE — 87077 CULTURE AEROBIC IDENTIFY: CPT | Mod: 59

## 2020-12-18 PROCEDURE — 87186 SC STD MICRODIL/AGAR DIL: CPT | Mod: 59

## 2020-12-18 PROCEDURE — 11042 DBRDMT SUBQ TIS 1ST 20SQCM/<: CPT | Performed by: FAMILY MEDICINE

## 2020-12-18 PROCEDURE — 99214 PR OFFICE/OUTPT VISIT, EST, LEVL IV, 30-39 MIN: ICD-10-PCS | Mod: 25,,, | Performed by: FAMILY MEDICINE

## 2020-12-18 PROCEDURE — 11042 DEBRIDEMENT: ICD-10-PCS | Mod: ,,, | Performed by: FAMILY MEDICINE

## 2020-12-18 PROCEDURE — 87070 CULTURE OTHR SPECIMN AEROBIC: CPT

## 2020-12-18 NOTE — PROGRESS NOTES
[facility]                Progress Note    Subjective:       Patient ID: Catalino Mcfadden is a 55 y.o. male.    Chief Complaint: Non-healing Wound Follow Up and Diabetic Foot Ulcer    12/18/2020: F/U visit. Pt ambulated to exam room with no assistance. No fever. Denies pain towards wound. Wound is improving and measuring smaller. 100% red granular tissue with macerated periwound. Drainage decreased. Removed 2 sutures. Culture taken to a small pus pocket. Continue same dressing as ordered. Discussed elevating/ offloading as much as possible.      Review of Systems   Constitutional: Negative.    HENT: Negative.    Eyes: Negative.    Respiratory: Negative.    Cardiovascular: Positive for leg swelling. Negative for chest pain and palpitations.   Skin: Positive for wound.   Neurological: Positive for numbness.         Objective:        Physical Exam  Constitutional:       Appearance: Normal appearance.   HENT:      Head: Normocephalic and atraumatic.      Right Ear: External ear normal.      Left Ear: External ear normal.      Mouth/Throat:      Mouth: Mucous membranes are moist.   Eyes:      Extraocular Movements: Extraocular movements intact.      Conjunctiva/sclera: Conjunctivae normal.      Pupils: Pupils are equal, round, and reactive to light.   Neck:      Musculoskeletal: Normal range of motion and neck supple.   Cardiovascular:      Rate and Rhythm: Normal rate and regular rhythm.   Pulmonary:      Effort: Pulmonary effort is normal. No respiratory distress.      Breath sounds: No wheezing.   Abdominal:      General: There is no distension.      Palpations: Abdomen is soft.      Tenderness: There is no abdominal tenderness.   Musculoskeletal:      Right lower leg: Edema present.      Left lower leg: Edema present.   Skin:     General: Skin is warm and dry.      Comments: +lateral foot DFU with maceration to anterior portion; purulent drainage from anterior portion as well  "  Neurological:      Mental Status: He is alert.           Vitals:    12/18/20 1007   BP: 136/80   Pulse: 83   Resp: 18   Temp: 97.3 °F (36.3 °C)     Debridement    Date/Time: 12/18/2020 10:52 AM  Performed by: Marty Carvajal MD  Authorized by: Marty Carvajal MD     Time out: Immediately prior to procedure a "time out" was called to verify the correct patient, procedure, equipment, support staff and site/side marked as required.    Consent Done?:  Yes (Written)  Local anesthesia used?: No      Wound Details:    Location:  Left foot    Location:  Left Midfoot       Length (cm):  0.8       Area (sq cm):  0.8       Width (cm):  1       Percent Debrided (%):  50       Depth (cm):  0.1       Total Area Debrided (sq cm):  0.4    Depth of debridement:  Subcutaneous tissue    Tissue debrided:  Dermis, Epidermis and Subcutaneous    Devitalized tissue debrided:  Biofilm, Fibrin and Slough    Instruments:  Curette    Bleeding:  Minimal  Hemostasis Achieved: Yes    Method Used:  Pressure  Patient tolerance:  Patient tolerated the procedure well with no immediate complications      Assessment:           ICD-10-CM ICD-9-CM   1. 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  E11.621 250.80    L97.526 707.15            Incision/Site 07/17/20 1251 Left Foot (Active)   07/17/20 1251   Present Prior to Hospital Arrival?:    Side: Left   Location: Foot   Orientation:    Incision Type:    Closure Method: Other (see comments)   Additional Comments:  open wound .betadine soak in 4x4's, abd, ace, castpadding, surgical boot   Removal Indication and Assessment:    Wound Outcome:    Removal Indications:    Wound Image   12/18/20 1000   Incision WDL ex 12/18/20 1000   Dressing Appearance Intact;Moist drainage 12/18/20 1000   Drainage Amount Small 12/18/20 1000   Drainage Characteristics/Odor Serous;Yellow 12/18/20 1000   Appearance Red;Pink;White;Sutures intact " 12/18/20 1000   Black (%), Wound Tissue Color 0 % 12/18/20 1000   Red (%), Wound Tissue Color 100 % 12/18/20 1000   Yellow (%), Wound Tissue Color 0 % 12/18/20 1000   Periwound Area Macerated 12/18/20 1000   Wound Edges Open 12/18/20 1000   Wound Length (cm) 0.8 cm 12/18/20 1000   Wound Width (cm) 1 cm 12/18/20 1000   Wound Depth (cm) 0.1 cm 12/18/20 1000   Wound Volume (cm^3) 0.08 cm^3 12/18/20 1000   Wound Surface Area (cm^2) 0.8 cm^2 12/18/20 1000   Tunneling (depth (cm)/location) 0 12/18/20 1000   Undermining (depth (cm)/location) 0 12/18/20 1000   Care Soap and water;Sterile normal saline;Wound cleanser 12/18/20 1000   Dressing Applied;Collagen;Other (see comments);Foam 12/18/20 1000   Compression Two layer compression 12/18/20 1000   Off Loading Football dressing 12/18/20 1000   Dressing Change Due 12/22/20 12/18/20 1000           Plan:                Orders Placed This Encounter   Procedures    Debridement     This order was created via procedure documentation     Standing Status:   Standing     Number of Occurrences:   1    Aerobic culture    Change dressing     Gentian violet periwound, lucas fenestrated, drawtex cut to fit, mextra short X1, jaleel foam long X2 perpendicular, football dressing (cast padding X3), coflex calamine        Follow up in about 4 days (around 12/22/2020) for wound care.     Marty Carvajal MD

## 2020-12-22 ENCOUNTER — HOSPITAL ENCOUNTER (OUTPATIENT)
Dept: WOUND CARE | Facility: HOSPITAL | Age: 55
Discharge: HOME | End: 2020-12-22
Attending: FAMILY MEDICINE
Payer: COMMERCIAL

## 2020-12-22 VITALS
SYSTOLIC BLOOD PRESSURE: 132 MMHG | RESPIRATION RATE: 17 BRPM | HEART RATE: 91 BPM | TEMPERATURE: 98 F | DIASTOLIC BLOOD PRESSURE: 69 MMHG

## 2020-12-22 DIAGNOSIS — L97.426 DIABETIC ULCER OF LEFT MIDFOOT ASSOCIATED WITH TYPE 2 DIABETES MELLITUS, WITH BONE INVOLVEMENT WITHOUT EVIDENCE OF NECROSIS: Primary | ICD-10-CM

## 2020-12-22 DIAGNOSIS — E11.621 DIABETIC ULCER OF LEFT MIDFOOT ASSOCIATED WITH TYPE 2 DIABETES MELLITUS, WITH BONE INVOLVEMENT WITHOUT EVIDENCE OF NECROSIS: Primary | ICD-10-CM

## 2020-12-22 PROCEDURE — 99214 PR OFFICE/OUTPT VISIT, EST, LEVL IV, 30-39 MIN: ICD-10-PCS | Mod: ,,, | Performed by: FAMILY MEDICINE

## 2020-12-22 PROCEDURE — 99214 OFFICE O/P EST MOD 30 MIN: CPT | Mod: ,,, | Performed by: FAMILY MEDICINE

## 2020-12-22 PROCEDURE — 29581 APPL MULTLAYER CMPRN SYS LEG: CPT | Performed by: FAMILY MEDICINE

## 2020-12-22 RX ORDER — CLINDAMYCIN HYDROCHLORIDE 300 MG/1
300 CAPSULE ORAL EVERY 8 HOURS
Qty: 30 CAPSULE | Refills: 0 | Status: SHIPPED | OUTPATIENT
Start: 2020-12-22 | End: 2021-01-01

## 2020-12-23 LAB
BACTERIA SPEC AEROBE CULT: ABNORMAL
BACTERIA SPEC AEROBE CULT: ABNORMAL

## 2020-12-29 ENCOUNTER — HOSPITAL ENCOUNTER (OUTPATIENT)
Dept: WOUND CARE | Facility: HOSPITAL | Age: 55
Discharge: HOME | End: 2020-12-29
Attending: FAMILY MEDICINE
Payer: COMMERCIAL

## 2020-12-29 VITALS — TEMPERATURE: 98 F | HEART RATE: 86 BPM | DIASTOLIC BLOOD PRESSURE: 81 MMHG | SYSTOLIC BLOOD PRESSURE: 157 MMHG

## 2020-12-29 DIAGNOSIS — E11.621 DIABETIC ULCER OF LEFT FOOT ASSOCIATED WITH TYPE 2 DIABETES MELLITUS, WITH BONE INVOLVEMENT WITHOUT EVIDENCE OF NECROSIS: Primary | ICD-10-CM

## 2020-12-29 DIAGNOSIS — L97.526 DIABETIC ULCER OF LEFT FOOT ASSOCIATED WITH TYPE 2 DIABETES MELLITUS, WITH BONE INVOLVEMENT WITHOUT EVIDENCE OF NECROSIS: Primary | ICD-10-CM

## 2020-12-29 PROCEDURE — 99214 OFFICE O/P EST MOD 30 MIN: CPT | Mod: ,,, | Performed by: FAMILY MEDICINE

## 2020-12-29 PROCEDURE — 29581 APPL MULTLAYER CMPRN SYS LEG: CPT | Performed by: FAMILY MEDICINE

## 2020-12-29 PROCEDURE — 99214 PR OFFICE/OUTPT VISIT, EST, LEVL IV, 30-39 MIN: ICD-10-PCS | Mod: ,,, | Performed by: FAMILY MEDICINE

## 2020-12-29 NOTE — PROGRESS NOTES
[facility]                Progress Note    Subjective:       Patient ID: Catalino Mcfadden is a 55 y.o. male.    Chief Complaint: No chief complaint on file.    Patient ambulated to exam chair without assistance with CAM boot in place. LLE dressing intact. Swelling controlled to LLE. No pain reported. Left Plantar Foot Diabetic Ulcer measuring smaller this week. Note that patient has indentation of aperture pad around wound in addition to the mextra/drawtex used- indicating edema. Patient reports that he is still mostly offloading at home. Will continue with endoform collagen use and offloading pad with coflex compression.       Review of Systems   Constitutional: Negative.    HENT: Negative.    Eyes: Negative.    Respiratory: Negative.    Cardiovascular: Negative.    Gastrointestinal: Negative.    Endocrine: Negative.    Musculoskeletal: Negative.    Skin: Positive for wound.   Psychiatric/Behavioral: Negative.          Objective:        Physical Exam  Vitals signs reviewed.   Constitutional:       Appearance: Normal appearance.   HENT:      Head: Normocephalic and atraumatic.      Right Ear: External ear normal.      Left Ear: External ear normal.      Mouth/Throat:      Mouth: Mucous membranes are moist.      Pharynx: Oropharynx is clear.   Eyes:      Extraocular Movements: Extraocular movements intact.      Conjunctiva/sclera: Conjunctivae normal.      Pupils: Pupils are equal, round, and reactive to light.   Pulmonary:      Effort: Pulmonary effort is normal. No respiratory distress.      Breath sounds: No wheezing.   Abdominal:      General: There is no distension.      Palpations: Abdomen is soft.      Tenderness: There is no abdominal tenderness.   Skin:     General: Skin is warm and dry.      Comments: +dfu to plantar surface of left foot with mild maceration; no slough   Neurological:      Mental Status: He is alert.   Psychiatric:         Mood and Affect: Mood  normal.         Behavior: Behavior normal.         Vitals:    12/29/20 1014   BP: (!) 157/81   Pulse: 86   Temp: 97.7 °F (36.5 °C)       Assessment:           ICD-10-CM ICD-9-CM   1. 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  E11.621 250.80    L97.526 707.15            Incision/Site 07/17/20 1251 Left Foot (Active)   07/17/20 1251   Present Prior to Hospital Arrival?:    Side: Left   Location: Foot   Orientation:    Incision Type:    Closure Method: Other (see comments)   Additional Comments:  open wound .betadine soak in 4x4's, abd, ace, castpadding, surgical boot   Removal Indication and Assessment:    Wound Outcome:    Removal Indications:    Wound Image   12/29/20 1000   Incision WDL ex 12/29/20 1000   Dressing Appearance Dried drainage 12/29/20 1000   Drainage Amount Small 12/29/20 1000   Drainage Characteristics/Odor Serosanguineous 12/29/20 1000   Appearance Red;Granulating 12/29/20 1000   Red (%), Wound Tissue Color 100 % 12/29/20 1000   Periwound Area Macerated 12/29/20 1000   Wound Edges Open 12/29/20 1000   Wound Length (cm) 0.5 cm 12/29/20 1000   Wound Width (cm) 0.7 cm 12/29/20 1000   Wound Depth (cm) 0.1 cm 12/29/20 1000   Wound Volume (cm^3) 0.04 cm^3 12/29/20 1000   Wound Surface Area (cm^2) 0.35 cm^2 12/29/20 1000   Care Cleansed with:;Soap and water;Sterile normal saline 12/29/20 1000   Dressing Changed 12/29/20 1000   Periwound Care Moisture barrier applied 12/29/20 1000   Compression Two layer compression 12/29/20 1000   Off Loading Football dressing 12/29/20 1000           Plan:                Orders Placed This Encounter   Procedures    Change dressing     Gentian violet periwound, endoform to wound bed, custom cut pad with opening cut slightly larger than wound, drawtex cut to fit over wound, mextra short X1, jaleel foam long X2 perpendicular, football dressing (cast padding X3), coflex calamine        Follow up in  about 1 week (around 1/5/2021) for wound care.     Marty Carvajal MD

## 2021-01-04 ENCOUNTER — PATIENT MESSAGE (OUTPATIENT)
Dept: ADMINISTRATIVE | Facility: HOSPITAL | Age: 56
End: 2021-01-04

## 2021-01-05 ENCOUNTER — HOSPITAL ENCOUNTER (OUTPATIENT)
Dept: WOUND CARE | Facility: HOSPITAL | Age: 56
Discharge: HOME | End: 2021-01-05
Attending: FAMILY MEDICINE
Payer: COMMERCIAL

## 2021-01-05 VITALS — TEMPERATURE: 98 F | SYSTOLIC BLOOD PRESSURE: 169 MMHG | HEART RATE: 81 BPM | DIASTOLIC BLOOD PRESSURE: 82 MMHG

## 2021-01-05 DIAGNOSIS — L97.526 DIABETIC ULCER OF LEFT FOOT ASSOCIATED WITH TYPE 2 DIABETES MELLITUS, WITH BONE INVOLVEMENT WITHOUT EVIDENCE OF NECROSIS: Primary | ICD-10-CM

## 2021-01-05 DIAGNOSIS — E11.621 DIABETIC ULCER OF LEFT FOOT ASSOCIATED WITH TYPE 2 DIABETES MELLITUS, WITH BONE INVOLVEMENT WITHOUT EVIDENCE OF NECROSIS: Primary | ICD-10-CM

## 2021-01-05 PROCEDURE — 29581 APPL MULTLAYER CMPRN SYS LEG: CPT

## 2021-01-08 ENCOUNTER — HOSPITAL ENCOUNTER (OUTPATIENT)
Dept: WOUND CARE | Facility: HOSPITAL | Age: 56
Discharge: HOME | End: 2021-01-08
Attending: PODIATRIST
Payer: COMMERCIAL

## 2021-01-08 VITALS
RESPIRATION RATE: 20 BRPM | HEART RATE: 80 BPM | SYSTOLIC BLOOD PRESSURE: 169 MMHG | DIASTOLIC BLOOD PRESSURE: 92 MMHG | TEMPERATURE: 98 F

## 2021-01-08 DIAGNOSIS — E11.621 DIABETIC ULCER OF LEFT FOOT ASSOCIATED WITH TYPE 2 DIABETES MELLITUS, WITH BONE INVOLVEMENT WITHOUT EVIDENCE OF NECROSIS: Primary | ICD-10-CM

## 2021-01-08 DIAGNOSIS — L97.526 DIABETIC ULCER OF LEFT FOOT ASSOCIATED WITH TYPE 2 DIABETES MELLITUS, WITH BONE INVOLVEMENT WITHOUT EVIDENCE OF NECROSIS: Primary | ICD-10-CM

## 2021-01-08 PROCEDURE — 99213 OFFICE O/P EST LOW 20 MIN: CPT | Performed by: PODIATRIST

## 2021-01-08 PROCEDURE — 99213 PR OFFICE/OUTPT VISIT, EST, LEVL III, 20-29 MIN: ICD-10-PCS | Mod: ,,, | Performed by: PODIATRIST

## 2021-01-08 PROCEDURE — 99213 OFFICE O/P EST LOW 20 MIN: CPT | Mod: ,,, | Performed by: PODIATRIST

## 2021-01-15 ENCOUNTER — HOSPITAL ENCOUNTER (OUTPATIENT)
Dept: WOUND CARE | Facility: HOSPITAL | Age: 56
Discharge: HOME | End: 2021-01-15
Attending: PODIATRIST
Payer: COMMERCIAL

## 2021-01-15 VITALS
RESPIRATION RATE: 17 BRPM | TEMPERATURE: 98 F | HEART RATE: 85 BPM | DIASTOLIC BLOOD PRESSURE: 83 MMHG | SYSTOLIC BLOOD PRESSURE: 172 MMHG

## 2021-01-15 DIAGNOSIS — Z87.2 HEALED ULCER OF LEFT FOOT ON EXAMINATION: Primary | ICD-10-CM

## 2021-01-15 DIAGNOSIS — Z86.31 HISTORY OF DIABETIC ULCER OF FOOT: ICD-10-CM

## 2021-01-15 DIAGNOSIS — E11.49 TYPE II DIABETES MELLITUS WITH NEUROLOGICAL MANIFESTATIONS: ICD-10-CM

## 2021-01-15 PROCEDURE — 99213 OFFICE O/P EST LOW 20 MIN: CPT | Performed by: PODIATRIST

## 2021-01-15 PROCEDURE — 99213 OFFICE O/P EST LOW 20 MIN: CPT | Mod: ,,, | Performed by: PODIATRIST

## 2021-01-15 PROCEDURE — 99213 PR OFFICE/OUTPT VISIT, EST, LEVL III, 20-29 MIN: ICD-10-PCS | Mod: ,,, | Performed by: PODIATRIST

## 2021-01-18 RX ORDER — CARVEDILOL 25 MG/1
25 TABLET ORAL 2 TIMES DAILY
Qty: 180 TABLET | Refills: 3 | Status: SHIPPED | OUTPATIENT
Start: 2021-01-18 | End: 2021-01-19 | Stop reason: SDUPTHER

## 2021-01-19 DIAGNOSIS — I10 ESSENTIAL HYPERTENSION: Primary | Chronic | ICD-10-CM

## 2021-01-19 RX ORDER — CARVEDILOL 25 MG/1
25 TABLET ORAL 2 TIMES DAILY
Qty: 180 TABLET | Refills: 1 | Status: SHIPPED | OUTPATIENT
Start: 2021-01-19 | End: 2021-05-25 | Stop reason: SDUPTHER

## 2021-01-25 ENCOUNTER — PATIENT OUTREACH (OUTPATIENT)
Dept: ADMINISTRATIVE | Facility: OTHER | Age: 56
End: 2021-01-25

## 2021-01-27 ENCOUNTER — OFFICE VISIT (OUTPATIENT)
Dept: PODIATRY | Facility: CLINIC | Age: 56
End: 2021-01-27
Payer: COMMERCIAL

## 2021-01-27 VITALS — BODY MASS INDEX: 35.78 KG/M2 | HEIGHT: 73 IN | WEIGHT: 270 LBS

## 2021-01-27 DIAGNOSIS — M20.42 HAMMER TOES OF BOTH FEET: ICD-10-CM

## 2021-01-27 DIAGNOSIS — M20.41 HAMMER TOES OF BOTH FEET: ICD-10-CM

## 2021-01-27 DIAGNOSIS — E11.49 TYPE II DIABETES MELLITUS WITH NEUROLOGICAL MANIFESTATIONS: Primary | ICD-10-CM

## 2021-01-27 DIAGNOSIS — M20.5X1 HALLUX LIMITUS, ACQUIRED, RIGHT: ICD-10-CM

## 2021-01-27 DIAGNOSIS — L90.9 PLANTAR FAT PAD ATROPHY: ICD-10-CM

## 2021-01-27 DIAGNOSIS — E08.621 DIABETIC ULCER OF LEFT MIDFOOT ASSOCIATED WITH DIABETES MELLITUS DUE TO UNDERLYING CONDITION, WITH FAT LAYER EXPOSED: ICD-10-CM

## 2021-01-27 DIAGNOSIS — L97.422 DIABETIC ULCER OF LEFT MIDFOOT ASSOCIATED WITH DIABETES MELLITUS DUE TO UNDERLYING CONDITION, WITH FAT LAYER EXPOSED: ICD-10-CM

## 2021-01-27 DIAGNOSIS — M20.5X2 HALLUX LIMITUS, ACQUIRED, LEFT: ICD-10-CM

## 2021-01-27 DIAGNOSIS — M21.6X1 PLANTARFLEXION DEFORMITY OF RIGHT FOOT: ICD-10-CM

## 2021-01-27 PROCEDURE — 99999 PR PBB SHADOW E&M-EST. PATIENT-LVL IV: ICD-10-PCS | Mod: PBBFAC,,, | Performed by: PODIATRIST

## 2021-01-27 PROCEDURE — 3044F PR MOST RECENT HEMOGLOBIN A1C LEVEL <7.0%: ICD-10-PCS | Mod: CPTII,S$GLB,, | Performed by: PODIATRIST

## 2021-01-27 PROCEDURE — 99213 PR OFFICE/OUTPT VISIT, EST, LEVL III, 20-29 MIN: ICD-10-PCS | Mod: S$GLB,,, | Performed by: PODIATRIST

## 2021-01-27 PROCEDURE — 3044F HG A1C LEVEL LT 7.0%: CPT | Mod: CPTII,S$GLB,, | Performed by: PODIATRIST

## 2021-01-27 PROCEDURE — 3008F BODY MASS INDEX DOCD: CPT | Mod: CPTII,S$GLB,, | Performed by: PODIATRIST

## 2021-01-27 PROCEDURE — 1126F PR PAIN SEVERITY QUANTIFIED, NO PAIN PRESENT: ICD-10-PCS | Mod: S$GLB,,, | Performed by: PODIATRIST

## 2021-01-27 PROCEDURE — 3008F PR BODY MASS INDEX (BMI) DOCUMENTED: ICD-10-PCS | Mod: CPTII,S$GLB,, | Performed by: PODIATRIST

## 2021-01-27 PROCEDURE — 99213 OFFICE O/P EST LOW 20 MIN: CPT | Mod: S$GLB,,, | Performed by: PODIATRIST

## 2021-01-27 PROCEDURE — 99999 PR PBB SHADOW E&M-EST. PATIENT-LVL IV: CPT | Mod: PBBFAC,,, | Performed by: PODIATRIST

## 2021-01-27 PROCEDURE — 1126F AMNT PAIN NOTED NONE PRSNT: CPT | Mod: S$GLB,,, | Performed by: PODIATRIST

## 2021-02-10 ENCOUNTER — OFFICE VISIT (OUTPATIENT)
Dept: PODIATRY | Facility: CLINIC | Age: 56
End: 2021-02-10
Payer: COMMERCIAL

## 2021-02-10 VITALS
HEIGHT: 73 IN | WEIGHT: 270 LBS | BODY MASS INDEX: 35.78 KG/M2 | DIASTOLIC BLOOD PRESSURE: 67 MMHG | SYSTOLIC BLOOD PRESSURE: 132 MMHG

## 2021-02-10 DIAGNOSIS — L90.9 PLANTAR FAT PAD ATROPHY: ICD-10-CM

## 2021-02-10 DIAGNOSIS — E08.621 DIABETIC ULCER OF LEFT MIDFOOT ASSOCIATED WITH DIABETES MELLITUS DUE TO UNDERLYING CONDITION, WITH FAT LAYER EXPOSED: ICD-10-CM

## 2021-02-10 DIAGNOSIS — E11.49 TYPE II DIABETES MELLITUS WITH NEUROLOGICAL MANIFESTATIONS: Primary | ICD-10-CM

## 2021-02-10 DIAGNOSIS — L97.422 DIABETIC ULCER OF LEFT MIDFOOT ASSOCIATED WITH DIABETES MELLITUS DUE TO UNDERLYING CONDITION, WITH FAT LAYER EXPOSED: ICD-10-CM

## 2021-02-10 PROCEDURE — 99499 NO LOS: ICD-10-PCS | Mod: S$GLB,,, | Performed by: PODIATRIST

## 2021-02-10 PROCEDURE — 99999 PR PBB SHADOW E&M-EST. PATIENT-LVL IV: CPT | Mod: PBBFAC,,, | Performed by: PODIATRIST

## 2021-02-10 PROCEDURE — 11042 DBRDMT SUBQ TIS 1ST 20SQCM/<: CPT | Mod: S$GLB,,, | Performed by: PODIATRIST

## 2021-02-10 PROCEDURE — 87075 CULTR BACTERIA EXCEPT BLOOD: CPT

## 2021-02-10 PROCEDURE — 99999 PR PBB SHADOW E&M-EST. PATIENT-LVL IV: ICD-10-PCS | Mod: PBBFAC,,, | Performed by: PODIATRIST

## 2021-02-10 PROCEDURE — 87070 CULTURE OTHR SPECIMN AEROBIC: CPT

## 2021-02-10 PROCEDURE — 99499 UNLISTED E&M SERVICE: CPT | Mod: S$GLB,,, | Performed by: PODIATRIST

## 2021-02-10 PROCEDURE — 3008F PR BODY MASS INDEX (BMI) DOCUMENTED: ICD-10-PCS | Mod: CPTII,S$GLB,, | Performed by: PODIATRIST

## 2021-02-10 PROCEDURE — 1126F PR PAIN SEVERITY QUANTIFIED, NO PAIN PRESENT: ICD-10-PCS | Mod: S$GLB,,, | Performed by: PODIATRIST

## 2021-02-10 PROCEDURE — 1126F AMNT PAIN NOTED NONE PRSNT: CPT | Mod: S$GLB,,, | Performed by: PODIATRIST

## 2021-02-10 PROCEDURE — 3008F BODY MASS INDEX DOCD: CPT | Mod: CPTII,S$GLB,, | Performed by: PODIATRIST

## 2021-02-10 PROCEDURE — 11042 WOUND DEBRIDEMENT: ICD-10-PCS | Mod: S$GLB,,, | Performed by: PODIATRIST

## 2021-02-15 LAB — BACTERIA SPEC ANAEROBE CULT: NORMAL

## 2021-02-26 LAB — BACTERIA SPEC AEROBE CULT: NORMAL

## 2021-03-01 ENCOUNTER — PATIENT OUTREACH (OUTPATIENT)
Dept: ADMINISTRATIVE | Facility: OTHER | Age: 56
End: 2021-03-01

## 2021-03-04 ENCOUNTER — OFFICE VISIT (OUTPATIENT)
Dept: PODIATRY | Facility: CLINIC | Age: 56
End: 2021-03-04
Payer: COMMERCIAL

## 2021-03-04 VITALS — HEIGHT: 73 IN | WEIGHT: 270 LBS | BODY MASS INDEX: 35.78 KG/M2

## 2021-03-04 DIAGNOSIS — E08.621 DIABETIC ULCER OF LEFT MIDFOOT ASSOCIATED WITH DIABETES MELLITUS DUE TO UNDERLYING CONDITION, WITH FAT LAYER EXPOSED: ICD-10-CM

## 2021-03-04 DIAGNOSIS — E11.49 TYPE II DIABETES MELLITUS WITH NEUROLOGICAL MANIFESTATIONS: Primary | ICD-10-CM

## 2021-03-04 DIAGNOSIS — L97.422 DIABETIC ULCER OF LEFT MIDFOOT ASSOCIATED WITH DIABETES MELLITUS DUE TO UNDERLYING CONDITION, WITH FAT LAYER EXPOSED: ICD-10-CM

## 2021-03-04 PROCEDURE — 3008F BODY MASS INDEX DOCD: CPT | Mod: CPTII,S$GLB,, | Performed by: PODIATRIST

## 2021-03-04 PROCEDURE — 11042 WOUND DEBRIDEMENT: ICD-10-PCS | Mod: S$GLB,,, | Performed by: PODIATRIST

## 2021-03-04 PROCEDURE — 99999 PR PBB SHADOW E&M-EST. PATIENT-LVL IV: CPT | Mod: PBBFAC,,, | Performed by: PODIATRIST

## 2021-03-04 PROCEDURE — 99499 UNLISTED E&M SERVICE: CPT | Mod: S$GLB,,, | Performed by: PODIATRIST

## 2021-03-04 PROCEDURE — 99999 PR PBB SHADOW E&M-EST. PATIENT-LVL IV: ICD-10-PCS | Mod: PBBFAC,,, | Performed by: PODIATRIST

## 2021-03-04 PROCEDURE — 1126F AMNT PAIN NOTED NONE PRSNT: CPT | Mod: S$GLB,,, | Performed by: PODIATRIST

## 2021-03-04 PROCEDURE — 3008F PR BODY MASS INDEX (BMI) DOCUMENTED: ICD-10-PCS | Mod: CPTII,S$GLB,, | Performed by: PODIATRIST

## 2021-03-04 PROCEDURE — 11042 DBRDMT SUBQ TIS 1ST 20SQCM/<: CPT | Mod: S$GLB,,, | Performed by: PODIATRIST

## 2021-03-04 PROCEDURE — 1126F PR PAIN SEVERITY QUANTIFIED, NO PAIN PRESENT: ICD-10-PCS | Mod: S$GLB,,, | Performed by: PODIATRIST

## 2021-03-04 PROCEDURE — 99499 NO LOS: ICD-10-PCS | Mod: S$GLB,,, | Performed by: PODIATRIST

## 2021-03-11 ENCOUNTER — OFFICE VISIT (OUTPATIENT)
Dept: PODIATRY | Facility: CLINIC | Age: 56
End: 2021-03-11
Payer: COMMERCIAL

## 2021-03-11 DIAGNOSIS — E08.621 DIABETIC ULCER OF LEFT MIDFOOT ASSOCIATED WITH DIABETES MELLITUS DUE TO UNDERLYING CONDITION, WITH FAT LAYER EXPOSED: ICD-10-CM

## 2021-03-11 DIAGNOSIS — L97.422 DIABETIC ULCER OF LEFT MIDFOOT ASSOCIATED WITH DIABETES MELLITUS DUE TO UNDERLYING CONDITION, WITH FAT LAYER EXPOSED: ICD-10-CM

## 2021-03-11 DIAGNOSIS — E11.49 TYPE II DIABETES MELLITUS WITH NEUROLOGICAL MANIFESTATIONS: Primary | ICD-10-CM

## 2021-03-11 PROCEDURE — 99999 PR PBB SHADOW E&M-EST. PATIENT-LVL I: ICD-10-PCS | Mod: PBBFAC,,, | Performed by: PODIATRIST

## 2021-03-11 PROCEDURE — 3044F HG A1C LEVEL LT 7.0%: CPT | Mod: CPTII,S$GLB,, | Performed by: PODIATRIST

## 2021-03-11 PROCEDURE — 99213 PR OFFICE/OUTPT VISIT, EST, LEVL III, 20-29 MIN: ICD-10-PCS | Mod: S$GLB,,, | Performed by: PODIATRIST

## 2021-03-11 PROCEDURE — 3044F PR MOST RECENT HEMOGLOBIN A1C LEVEL <7.0%: ICD-10-PCS | Mod: CPTII,S$GLB,, | Performed by: PODIATRIST

## 2021-03-11 PROCEDURE — 99213 OFFICE O/P EST LOW 20 MIN: CPT | Mod: S$GLB,,, | Performed by: PODIATRIST

## 2021-03-11 PROCEDURE — 99999 PR PBB SHADOW E&M-EST. PATIENT-LVL I: CPT | Mod: PBBFAC,,, | Performed by: PODIATRIST

## 2021-03-18 ENCOUNTER — OFFICE VISIT (OUTPATIENT)
Dept: PODIATRY | Facility: CLINIC | Age: 56
End: 2021-03-18
Payer: COMMERCIAL

## 2021-03-18 VITALS — BODY MASS INDEX: 35.78 KG/M2 | HEIGHT: 73 IN | WEIGHT: 270 LBS

## 2021-03-18 DIAGNOSIS — E11.49 TYPE II DIABETES MELLITUS WITH NEUROLOGICAL MANIFESTATIONS: Primary | ICD-10-CM

## 2021-03-18 DIAGNOSIS — M20.5X1 HALLUX LIMITUS, ACQUIRED, RIGHT: ICD-10-CM

## 2021-03-18 DIAGNOSIS — E08.621 DIABETIC ULCER OF LEFT MIDFOOT ASSOCIATED WITH DIABETES MELLITUS DUE TO UNDERLYING CONDITION, WITH FAT LAYER EXPOSED: ICD-10-CM

## 2021-03-18 DIAGNOSIS — L97.422 DIABETIC ULCER OF LEFT MIDFOOT ASSOCIATED WITH DIABETES MELLITUS DUE TO UNDERLYING CONDITION, WITH FAT LAYER EXPOSED: ICD-10-CM

## 2021-03-18 DIAGNOSIS — M20.41 HAMMER TOES OF BOTH FEET: ICD-10-CM

## 2021-03-18 DIAGNOSIS — M20.5X2 HALLUX LIMITUS, ACQUIRED, LEFT: ICD-10-CM

## 2021-03-18 DIAGNOSIS — M20.42 HAMMER TOES OF BOTH FEET: ICD-10-CM

## 2021-03-18 DIAGNOSIS — L90.9 PLANTAR FAT PAD ATROPHY: ICD-10-CM

## 2021-03-18 DIAGNOSIS — M21.6X1 PLANTARFLEXION DEFORMITY OF RIGHT FOOT: ICD-10-CM

## 2021-03-18 PROCEDURE — 3044F HG A1C LEVEL LT 7.0%: CPT | Mod: CPTII,S$GLB,, | Performed by: PODIATRIST

## 2021-03-18 PROCEDURE — 99999 PR PBB SHADOW E&M-EST. PATIENT-LVL II: CPT | Mod: PBBFAC,,, | Performed by: PODIATRIST

## 2021-03-18 PROCEDURE — 3044F PR MOST RECENT HEMOGLOBIN A1C LEVEL <7.0%: ICD-10-PCS | Mod: CPTII,S$GLB,, | Performed by: PODIATRIST

## 2021-03-18 PROCEDURE — 3008F PR BODY MASS INDEX (BMI) DOCUMENTED: ICD-10-PCS | Mod: CPTII,S$GLB,, | Performed by: PODIATRIST

## 2021-03-18 PROCEDURE — 99999 PR PBB SHADOW E&M-EST. PATIENT-LVL II: ICD-10-PCS | Mod: PBBFAC,,, | Performed by: PODIATRIST

## 2021-03-18 PROCEDURE — 1126F PR PAIN SEVERITY QUANTIFIED, NO PAIN PRESENT: ICD-10-PCS | Mod: S$GLB,,, | Performed by: PODIATRIST

## 2021-03-18 PROCEDURE — 99213 OFFICE O/P EST LOW 20 MIN: CPT | Mod: S$GLB,,, | Performed by: PODIATRIST

## 2021-03-18 PROCEDURE — 1126F AMNT PAIN NOTED NONE PRSNT: CPT | Mod: S$GLB,,, | Performed by: PODIATRIST

## 2021-03-18 PROCEDURE — 3008F BODY MASS INDEX DOCD: CPT | Mod: CPTII,S$GLB,, | Performed by: PODIATRIST

## 2021-03-18 PROCEDURE — 99213 PR OFFICE/OUTPT VISIT, EST, LEVL III, 20-29 MIN: ICD-10-PCS | Mod: S$GLB,,, | Performed by: PODIATRIST

## 2021-03-25 ENCOUNTER — OFFICE VISIT (OUTPATIENT)
Dept: PODIATRY | Facility: CLINIC | Age: 56
End: 2021-03-25
Payer: COMMERCIAL

## 2021-03-25 VITALS — WEIGHT: 270 LBS | BODY MASS INDEX: 35.78 KG/M2 | HEIGHT: 73 IN

## 2021-03-25 DIAGNOSIS — M20.42 HAMMER TOES OF BOTH FEET: ICD-10-CM

## 2021-03-25 DIAGNOSIS — M20.5X2 HALLUX LIMITUS, ACQUIRED, LEFT: ICD-10-CM

## 2021-03-25 DIAGNOSIS — E08.621 DIABETIC ULCER OF LEFT MIDFOOT ASSOCIATED WITH DIABETES MELLITUS DUE TO UNDERLYING CONDITION, WITH FAT LAYER EXPOSED: ICD-10-CM

## 2021-03-25 DIAGNOSIS — L97.422 DIABETIC ULCER OF LEFT MIDFOOT ASSOCIATED WITH DIABETES MELLITUS DUE TO UNDERLYING CONDITION, WITH FAT LAYER EXPOSED: ICD-10-CM

## 2021-03-25 DIAGNOSIS — M21.6X1 PLANTARFLEXION DEFORMITY OF RIGHT FOOT: ICD-10-CM

## 2021-03-25 DIAGNOSIS — M20.5X1 HALLUX LIMITUS, ACQUIRED, RIGHT: ICD-10-CM

## 2021-03-25 DIAGNOSIS — L90.9 PLANTAR FAT PAD ATROPHY: ICD-10-CM

## 2021-03-25 DIAGNOSIS — M20.41 HAMMER TOES OF BOTH FEET: ICD-10-CM

## 2021-03-25 DIAGNOSIS — E11.49 TYPE II DIABETES MELLITUS WITH NEUROLOGICAL MANIFESTATIONS: Primary | ICD-10-CM

## 2021-03-25 PROCEDURE — 99499 NO LOS: ICD-10-PCS | Mod: S$GLB,,, | Performed by: PODIATRIST

## 2021-03-25 PROCEDURE — 3008F BODY MASS INDEX DOCD: CPT | Mod: CPTII,S$GLB,, | Performed by: PODIATRIST

## 2021-03-25 PROCEDURE — 1126F AMNT PAIN NOTED NONE PRSNT: CPT | Mod: S$GLB,,, | Performed by: PODIATRIST

## 2021-03-25 PROCEDURE — 3008F PR BODY MASS INDEX (BMI) DOCUMENTED: ICD-10-PCS | Mod: CPTII,S$GLB,, | Performed by: PODIATRIST

## 2021-03-25 PROCEDURE — 11042 WOUND DEBRIDEMENT: ICD-10-PCS | Mod: S$GLB,,, | Performed by: PODIATRIST

## 2021-03-25 PROCEDURE — 99999 PR PBB SHADOW E&M-EST. PATIENT-LVL III: CPT | Mod: PBBFAC,,, | Performed by: PODIATRIST

## 2021-03-25 PROCEDURE — 11042 DBRDMT SUBQ TIS 1ST 20SQCM/<: CPT | Mod: S$GLB,,, | Performed by: PODIATRIST

## 2021-03-25 PROCEDURE — 99999 PR PBB SHADOW E&M-EST. PATIENT-LVL III: ICD-10-PCS | Mod: PBBFAC,,, | Performed by: PODIATRIST

## 2021-03-25 PROCEDURE — 1126F PR PAIN SEVERITY QUANTIFIED, NO PAIN PRESENT: ICD-10-PCS | Mod: S$GLB,,, | Performed by: PODIATRIST

## 2021-03-25 PROCEDURE — 99499 UNLISTED E&M SERVICE: CPT | Mod: S$GLB,,, | Performed by: PODIATRIST

## 2021-04-01 ENCOUNTER — OFFICE VISIT (OUTPATIENT)
Dept: PODIATRY | Facility: CLINIC | Age: 56
End: 2021-04-01
Payer: COMMERCIAL

## 2021-04-01 ENCOUNTER — PATIENT OUTREACH (OUTPATIENT)
Dept: ADMINISTRATIVE | Facility: OTHER | Age: 56
End: 2021-04-01

## 2021-04-01 VITALS — BODY MASS INDEX: 35.78 KG/M2 | HEIGHT: 73 IN | WEIGHT: 270 LBS

## 2021-04-01 DIAGNOSIS — E08.621 DIABETIC ULCER OF LEFT MIDFOOT ASSOCIATED WITH DIABETES MELLITUS DUE TO UNDERLYING CONDITION, WITH FAT LAYER EXPOSED: ICD-10-CM

## 2021-04-01 DIAGNOSIS — L97.422 DIABETIC ULCER OF LEFT MIDFOOT ASSOCIATED WITH DIABETES MELLITUS DUE TO UNDERLYING CONDITION, WITH FAT LAYER EXPOSED: ICD-10-CM

## 2021-04-01 DIAGNOSIS — E11.49 TYPE II DIABETES MELLITUS WITH NEUROLOGICAL MANIFESTATIONS: Primary | ICD-10-CM

## 2021-04-01 PROCEDURE — 99999 PR PBB SHADOW E&M-EST. PATIENT-LVL III: ICD-10-PCS | Mod: PBBFAC,,, | Performed by: PODIATRIST

## 2021-04-01 PROCEDURE — 3044F HG A1C LEVEL LT 7.0%: CPT | Mod: CPTII,S$GLB,, | Performed by: PODIATRIST

## 2021-04-01 PROCEDURE — 99213 OFFICE O/P EST LOW 20 MIN: CPT | Mod: S$GLB,,, | Performed by: PODIATRIST

## 2021-04-01 PROCEDURE — 3008F BODY MASS INDEX DOCD: CPT | Mod: CPTII,S$GLB,, | Performed by: PODIATRIST

## 2021-04-01 PROCEDURE — 1126F PR PAIN SEVERITY QUANTIFIED, NO PAIN PRESENT: ICD-10-PCS | Mod: S$GLB,,, | Performed by: PODIATRIST

## 2021-04-01 PROCEDURE — 99999 PR PBB SHADOW E&M-EST. PATIENT-LVL III: CPT | Mod: PBBFAC,,, | Performed by: PODIATRIST

## 2021-04-01 PROCEDURE — 99213 PR OFFICE/OUTPT VISIT, EST, LEVL III, 20-29 MIN: ICD-10-PCS | Mod: S$GLB,,, | Performed by: PODIATRIST

## 2021-04-01 PROCEDURE — 3008F PR BODY MASS INDEX (BMI) DOCUMENTED: ICD-10-PCS | Mod: CPTII,S$GLB,, | Performed by: PODIATRIST

## 2021-04-01 PROCEDURE — 1126F AMNT PAIN NOTED NONE PRSNT: CPT | Mod: S$GLB,,, | Performed by: PODIATRIST

## 2021-04-01 PROCEDURE — 3044F PR MOST RECENT HEMOGLOBIN A1C LEVEL <7.0%: ICD-10-PCS | Mod: CPTII,S$GLB,, | Performed by: PODIATRIST

## 2021-04-05 ENCOUNTER — PATIENT MESSAGE (OUTPATIENT)
Dept: ADMINISTRATIVE | Facility: HOSPITAL | Age: 56
End: 2021-04-05

## 2021-04-08 ENCOUNTER — OFFICE VISIT (OUTPATIENT)
Dept: PODIATRY | Facility: CLINIC | Age: 56
End: 2021-04-08
Payer: COMMERCIAL

## 2021-04-08 VITALS — HEIGHT: 73 IN | WEIGHT: 270 LBS | BODY MASS INDEX: 35.78 KG/M2

## 2021-04-08 DIAGNOSIS — L97.422 DIABETIC ULCER OF LEFT MIDFOOT ASSOCIATED WITH DIABETES MELLITUS DUE TO UNDERLYING CONDITION, WITH FAT LAYER EXPOSED: ICD-10-CM

## 2021-04-08 DIAGNOSIS — E11.49 TYPE II DIABETES MELLITUS WITH NEUROLOGICAL MANIFESTATIONS: Primary | ICD-10-CM

## 2021-04-08 DIAGNOSIS — E08.621 DIABETIC ULCER OF LEFT MIDFOOT ASSOCIATED WITH DIABETES MELLITUS DUE TO UNDERLYING CONDITION, WITH FAT LAYER EXPOSED: ICD-10-CM

## 2021-04-08 PROCEDURE — 99999 PR PBB SHADOW E&M-EST. PATIENT-LVL IV: CPT | Mod: PBBFAC,,, | Performed by: PODIATRIST

## 2021-04-08 PROCEDURE — 87186 SC STD MICRODIL/AGAR DIL: CPT | Performed by: PODIATRIST

## 2021-04-08 PROCEDURE — 3008F BODY MASS INDEX DOCD: CPT | Mod: CPTII,S$GLB,, | Performed by: PODIATRIST

## 2021-04-08 PROCEDURE — 87075 CULTR BACTERIA EXCEPT BLOOD: CPT | Performed by: PODIATRIST

## 2021-04-08 PROCEDURE — 87070 CULTURE OTHR SPECIMN AEROBIC: CPT | Performed by: PODIATRIST

## 2021-04-08 PROCEDURE — 3008F PR BODY MASS INDEX (BMI) DOCUMENTED: ICD-10-PCS | Mod: CPTII,S$GLB,, | Performed by: PODIATRIST

## 2021-04-08 PROCEDURE — 87077 CULTURE AEROBIC IDENTIFY: CPT | Mod: 59 | Performed by: PODIATRIST

## 2021-04-08 PROCEDURE — 1126F AMNT PAIN NOTED NONE PRSNT: CPT | Mod: S$GLB,,, | Performed by: PODIATRIST

## 2021-04-08 PROCEDURE — 99213 OFFICE O/P EST LOW 20 MIN: CPT | Mod: S$GLB,,, | Performed by: PODIATRIST

## 2021-04-08 PROCEDURE — 1126F PR PAIN SEVERITY QUANTIFIED, NO PAIN PRESENT: ICD-10-PCS | Mod: S$GLB,,, | Performed by: PODIATRIST

## 2021-04-08 PROCEDURE — 99999 PR PBB SHADOW E&M-EST. PATIENT-LVL IV: ICD-10-PCS | Mod: PBBFAC,,, | Performed by: PODIATRIST

## 2021-04-08 PROCEDURE — 99213 PR OFFICE/OUTPT VISIT, EST, LEVL III, 20-29 MIN: ICD-10-PCS | Mod: S$GLB,,, | Performed by: PODIATRIST

## 2021-04-12 ENCOUNTER — TELEPHONE (OUTPATIENT)
Dept: PODIATRY | Facility: CLINIC | Age: 56
End: 2021-04-12

## 2021-04-12 DIAGNOSIS — L97.422 DIABETIC ULCER OF LEFT MIDFOOT ASSOCIATED WITH DIABETES MELLITUS DUE TO UNDERLYING CONDITION, WITH FAT LAYER EXPOSED: ICD-10-CM

## 2021-04-12 DIAGNOSIS — E11.49 TYPE II DIABETES MELLITUS WITH NEUROLOGICAL MANIFESTATIONS: Primary | ICD-10-CM

## 2021-04-12 DIAGNOSIS — E08.621 DIABETIC ULCER OF LEFT MIDFOOT ASSOCIATED WITH DIABETES MELLITUS DUE TO UNDERLYING CONDITION, WITH FAT LAYER EXPOSED: ICD-10-CM

## 2021-04-12 LAB
BACTERIA SPEC AEROBE CULT: ABNORMAL
BACTERIA SPEC AEROBE CULT: ABNORMAL

## 2021-04-12 RX ORDER — CIPROFLOXACIN 500 MG/1
500 TABLET ORAL 2 TIMES DAILY
Qty: 20 TABLET | Refills: 0 | Status: SHIPPED | OUTPATIENT
Start: 2021-04-12 | End: 2021-04-22

## 2021-04-13 LAB — BACTERIA SPEC ANAEROBE CULT: NORMAL

## 2021-04-15 ENCOUNTER — OFFICE VISIT (OUTPATIENT)
Dept: PODIATRY | Facility: CLINIC | Age: 56
End: 2021-04-15
Payer: COMMERCIAL

## 2021-04-15 VITALS — BODY MASS INDEX: 35.78 KG/M2 | HEIGHT: 73 IN | WEIGHT: 270 LBS

## 2021-04-15 DIAGNOSIS — L90.9 PLANTAR FAT PAD ATROPHY: ICD-10-CM

## 2021-04-15 DIAGNOSIS — L97.422 DIABETIC ULCER OF LEFT MIDFOOT ASSOCIATED WITH DIABETES MELLITUS DUE TO UNDERLYING CONDITION, WITH FAT LAYER EXPOSED: ICD-10-CM

## 2021-04-15 DIAGNOSIS — E11.49 TYPE II DIABETES MELLITUS WITH NEUROLOGICAL MANIFESTATIONS: Primary | ICD-10-CM

## 2021-04-15 DIAGNOSIS — E08.621 DIABETIC ULCER OF LEFT MIDFOOT ASSOCIATED WITH DIABETES MELLITUS DUE TO UNDERLYING CONDITION, WITH FAT LAYER EXPOSED: ICD-10-CM

## 2021-04-15 DIAGNOSIS — M21.6X1 PLANTARFLEXION DEFORMITY OF RIGHT FOOT: ICD-10-CM

## 2021-04-15 PROCEDURE — 1126F AMNT PAIN NOTED NONE PRSNT: CPT | Mod: S$GLB,,, | Performed by: PODIATRIST

## 2021-04-15 PROCEDURE — 1126F PR PAIN SEVERITY QUANTIFIED, NO PAIN PRESENT: ICD-10-PCS | Mod: S$GLB,,, | Performed by: PODIATRIST

## 2021-04-15 PROCEDURE — 3008F BODY MASS INDEX DOCD: CPT | Mod: CPTII,S$GLB,, | Performed by: PODIATRIST

## 2021-04-15 PROCEDURE — 99999 PR PBB SHADOW E&M-EST. PATIENT-LVL IV: ICD-10-PCS | Mod: PBBFAC,,, | Performed by: PODIATRIST

## 2021-04-15 PROCEDURE — 99214 PR OFFICE/OUTPT VISIT, EST, LEVL IV, 30-39 MIN: ICD-10-PCS | Mod: S$GLB,,, | Performed by: PODIATRIST

## 2021-04-15 PROCEDURE — 3008F PR BODY MASS INDEX (BMI) DOCUMENTED: ICD-10-PCS | Mod: CPTII,S$GLB,, | Performed by: PODIATRIST

## 2021-04-15 PROCEDURE — 99214 OFFICE O/P EST MOD 30 MIN: CPT | Mod: S$GLB,,, | Performed by: PODIATRIST

## 2021-04-15 PROCEDURE — 99999 PR PBB SHADOW E&M-EST. PATIENT-LVL IV: CPT | Mod: PBBFAC,,, | Performed by: PODIATRIST

## 2021-04-22 ENCOUNTER — HOSPITAL ENCOUNTER (OUTPATIENT)
Dept: WOUND CARE | Facility: HOSPITAL | Age: 56
Discharge: HOME | End: 2021-04-22
Attending: PODIATRIST
Payer: COMMERCIAL

## 2021-04-22 VITALS
SYSTOLIC BLOOD PRESSURE: 186 MMHG | TEMPERATURE: 98 F | DIASTOLIC BLOOD PRESSURE: 92 MMHG | HEART RATE: 87 BPM | RESPIRATION RATE: 20 BRPM

## 2021-04-22 DIAGNOSIS — E08.621 DIABETIC ULCER OF LEFT MIDFOOT ASSOCIATED WITH DIABETES MELLITUS DUE TO UNDERLYING CONDITION, WITH FAT LAYER EXPOSED: ICD-10-CM

## 2021-04-22 DIAGNOSIS — L97.422 DIABETIC ULCER OF LEFT MIDFOOT ASSOCIATED WITH DIABETES MELLITUS DUE TO UNDERLYING CONDITION, WITH FAT LAYER EXPOSED: ICD-10-CM

## 2021-04-22 PROCEDURE — 11042 DBRDMT SUBQ TIS 1ST 20SQCM/<: CPT | Performed by: INTERNAL MEDICINE

## 2021-04-22 PROCEDURE — 11042 WOUND DEBRIDEMENT: ICD-10-PCS | Mod: ,,, | Performed by: INTERNAL MEDICINE

## 2021-04-22 PROCEDURE — 11042 DBRDMT SUBQ TIS 1ST 20SQCM/<: CPT | Mod: ,,, | Performed by: INTERNAL MEDICINE

## 2021-04-22 PROCEDURE — 27201912 HC WOUND CARE DEBRIDEMENT SUPPLIES: Performed by: INTERNAL MEDICINE

## 2021-04-22 PROCEDURE — 99214 PR OFFICE/OUTPT VISIT, EST, LEVL IV, 30-39 MIN: ICD-10-PCS | Mod: 25,,, | Performed by: INTERNAL MEDICINE

## 2021-04-22 PROCEDURE — 99214 OFFICE O/P EST MOD 30 MIN: CPT | Mod: 25,,, | Performed by: INTERNAL MEDICINE

## 2021-04-27 ENCOUNTER — HOSPITAL ENCOUNTER (OUTPATIENT)
Dept: WOUND CARE | Facility: HOSPITAL | Age: 56
Discharge: HOME | End: 2021-04-27
Attending: PODIATRIST
Payer: COMMERCIAL

## 2021-04-27 VITALS
RESPIRATION RATE: 16 BRPM | DIASTOLIC BLOOD PRESSURE: 69 MMHG | TEMPERATURE: 98 F | SYSTOLIC BLOOD PRESSURE: 136 MMHG | HEART RATE: 76 BPM

## 2021-04-27 PROCEDURE — 29581 APPL MULTLAYER CMPRN SYS LEG: CPT

## 2021-04-28 DIAGNOSIS — N18.4 TYPE 2 DIABETES MELLITUS WITH STAGE 4 CHRONIC KIDNEY DISEASE, WITH LONG-TERM CURRENT USE OF INSULIN: ICD-10-CM

## 2021-04-28 DIAGNOSIS — Z79.4 TYPE 2 DIABETES MELLITUS WITH STAGE 4 CHRONIC KIDNEY DISEASE, WITH LONG-TERM CURRENT USE OF INSULIN: ICD-10-CM

## 2021-04-28 DIAGNOSIS — E11.22 TYPE 2 DIABETES MELLITUS WITH STAGE 4 CHRONIC KIDNEY DISEASE, WITH LONG-TERM CURRENT USE OF INSULIN: ICD-10-CM

## 2021-04-30 ENCOUNTER — HOSPITAL ENCOUNTER (OUTPATIENT)
Dept: WOUND CARE | Facility: HOSPITAL | Age: 56
Discharge: HOME | End: 2021-04-30
Attending: PODIATRIST
Payer: COMMERCIAL

## 2021-04-30 VITALS
TEMPERATURE: 98 F | HEART RATE: 87 BPM | RESPIRATION RATE: 20 BRPM | DIASTOLIC BLOOD PRESSURE: 87 MMHG | SYSTOLIC BLOOD PRESSURE: 177 MMHG

## 2021-04-30 DIAGNOSIS — E08.621 DIABETIC ULCER OF LEFT MIDFOOT ASSOCIATED WITH DIABETES MELLITUS DUE TO UNDERLYING CONDITION, WITH FAT LAYER EXPOSED: Primary | ICD-10-CM

## 2021-04-30 DIAGNOSIS — L97.422 DIABETIC ULCER OF LEFT MIDFOOT ASSOCIATED WITH DIABETES MELLITUS DUE TO UNDERLYING CONDITION, WITH FAT LAYER EXPOSED: Primary | ICD-10-CM

## 2021-04-30 PROCEDURE — 27201912 HC WOUND CARE DEBRIDEMENT SUPPLIES: Performed by: PODIATRIST

## 2021-04-30 PROCEDURE — 11042 DBRDMT SUBQ TIS 1ST 20SQCM/<: CPT | Performed by: PODIATRIST

## 2021-04-30 PROCEDURE — 11042 WOUND DEBRIDEMENT: ICD-10-PCS | Mod: ,,, | Performed by: PODIATRIST

## 2021-04-30 PROCEDURE — 11042 DBRDMT SUBQ TIS 1ST 20SQCM/<: CPT | Mod: ,,, | Performed by: PODIATRIST

## 2021-04-30 PROCEDURE — 99499 UNLISTED E&M SERVICE: CPT | Mod: ,,, | Performed by: PODIATRIST

## 2021-04-30 PROCEDURE — 99499 NO LOS: ICD-10-PCS | Mod: ,,, | Performed by: PODIATRIST

## 2021-05-02 RX ORDER — DULAGLUTIDE 0.75 MG/.5ML
INJECTION, SOLUTION SUBCUTANEOUS
OUTPATIENT
Start: 2021-05-02

## 2021-05-02 RX ORDER — FUROSEMIDE 40 MG/1
40 TABLET ORAL DAILY
Qty: 30 TABLET | Refills: 0 | OUTPATIENT
Start: 2021-05-02

## 2021-05-02 RX ORDER — FUROSEMIDE 40 MG/1
40 TABLET ORAL DAILY
Qty: 30 TABLET | Refills: 0 | Status: SHIPPED | OUTPATIENT
Start: 2021-05-02 | End: 2021-05-25

## 2021-05-02 RX ORDER — DULAGLUTIDE 0.75 MG/.5ML
INJECTION, SOLUTION SUBCUTANEOUS
Qty: 1 PEN | Refills: 5 | Status: SHIPPED | OUTPATIENT
Start: 2021-05-02 | End: 2021-05-25 | Stop reason: SDUPTHER

## 2021-05-04 ENCOUNTER — HOSPITAL ENCOUNTER (OUTPATIENT)
Dept: WOUND CARE | Facility: HOSPITAL | Age: 56
Discharge: HOME | End: 2021-05-04
Attending: PODIATRIST
Payer: COMMERCIAL

## 2021-05-04 VITALS
DIASTOLIC BLOOD PRESSURE: 95 MMHG | SYSTOLIC BLOOD PRESSURE: 190 MMHG | HEART RATE: 85 BPM | RESPIRATION RATE: 20 BRPM | TEMPERATURE: 98 F

## 2021-05-04 DIAGNOSIS — L97.422 DIABETIC ULCER OF LEFT MIDFOOT ASSOCIATED WITH DIABETES MELLITUS DUE TO UNDERLYING CONDITION, WITH FAT LAYER EXPOSED: Primary | ICD-10-CM

## 2021-05-04 DIAGNOSIS — E08.621 DIABETIC ULCER OF LEFT MIDFOOT ASSOCIATED WITH DIABETES MELLITUS DUE TO UNDERLYING CONDITION, WITH FAT LAYER EXPOSED: Primary | ICD-10-CM

## 2021-05-04 PROCEDURE — 29581 APPL MULTLAYER CMPRN SYS LEG: CPT

## 2021-05-07 ENCOUNTER — HOSPITAL ENCOUNTER (OUTPATIENT)
Dept: WOUND CARE | Facility: HOSPITAL | Age: 56
Discharge: HOME | End: 2021-05-07
Attending: PODIATRIST
Payer: COMMERCIAL

## 2021-05-07 VITALS — HEART RATE: 84 BPM | TEMPERATURE: 98 F | DIASTOLIC BLOOD PRESSURE: 80 MMHG | SYSTOLIC BLOOD PRESSURE: 167 MMHG

## 2021-05-07 DIAGNOSIS — L97.526 DIABETIC ULCER OF LEFT FOOT ASSOCIATED WITH TYPE 2 DIABETES MELLITUS, WITH BONE INVOLVEMENT WITHOUT EVIDENCE OF NECROSIS: Primary | ICD-10-CM

## 2021-05-07 DIAGNOSIS — E11.621 DIABETIC ULCER OF LEFT FOOT ASSOCIATED WITH TYPE 2 DIABETES MELLITUS, WITH BONE INVOLVEMENT WITHOUT EVIDENCE OF NECROSIS: Primary | ICD-10-CM

## 2021-05-07 PROCEDURE — 29581 APPL MULTLAYER CMPRN SYS LEG: CPT | Performed by: PODIATRIST

## 2021-05-07 PROCEDURE — 99213 OFFICE O/P EST LOW 20 MIN: CPT | Mod: ,,, | Performed by: PODIATRIST

## 2021-05-07 PROCEDURE — 99213 PR OFFICE/OUTPT VISIT, EST, LEVL III, 20-29 MIN: ICD-10-PCS | Mod: ,,, | Performed by: PODIATRIST

## 2021-05-12 DIAGNOSIS — E11.9 TYPE 2 DIABETES MELLITUS WITHOUT COMPLICATION: ICD-10-CM

## 2021-05-14 ENCOUNTER — HOSPITAL ENCOUNTER (OUTPATIENT)
Dept: WOUND CARE | Facility: HOSPITAL | Age: 56
Discharge: HOME | End: 2021-05-14
Attending: FAMILY MEDICINE
Payer: COMMERCIAL

## 2021-05-14 VITALS — HEART RATE: 86 BPM | TEMPERATURE: 97 F | SYSTOLIC BLOOD PRESSURE: 170 MMHG | DIASTOLIC BLOOD PRESSURE: 79 MMHG

## 2021-05-14 DIAGNOSIS — I10 ESSENTIAL HYPERTENSION: ICD-10-CM

## 2021-05-14 DIAGNOSIS — E11.621 DIABETIC ULCER OF LEFT MIDFOOT ASSOCIATED WITH TYPE 2 DIABETES MELLITUS, WITH BONE INVOLVEMENT WITHOUT EVIDENCE OF NECROSIS: Primary | ICD-10-CM

## 2021-05-14 DIAGNOSIS — L97.426 DIABETIC ULCER OF LEFT MIDFOOT ASSOCIATED WITH TYPE 2 DIABETES MELLITUS, WITH BONE INVOLVEMENT WITHOUT EVIDENCE OF NECROSIS: Primary | ICD-10-CM

## 2021-05-14 PROCEDURE — 11042 DBRDMT SUBQ TIS 1ST 20SQCM/<: CPT

## 2021-05-14 PROCEDURE — 88305 TISSUE EXAM BY PATHOLOGIST: ICD-10-PCS | Mod: 26,,, | Performed by: PATHOLOGY

## 2021-05-14 PROCEDURE — 87186 SC STD MICRODIL/AGAR DIL: CPT | Performed by: FAMILY MEDICINE

## 2021-05-14 PROCEDURE — 88305 TISSUE EXAM BY PATHOLOGIST: CPT | Mod: 26,,, | Performed by: PATHOLOGY

## 2021-05-14 PROCEDURE — 87205 SMEAR GRAM STAIN: CPT | Performed by: FAMILY MEDICINE

## 2021-05-14 PROCEDURE — 27201912 HC WOUND CARE DEBRIDEMENT SUPPLIES

## 2021-05-14 PROCEDURE — 20220 BONE BIOPSY TROCAR/NDL SUPFC: CPT | Performed by: FAMILY MEDICINE

## 2021-05-14 PROCEDURE — 88305 TISSUE EXAM BY PATHOLOGIST: CPT | Performed by: PATHOLOGY

## 2021-05-14 PROCEDURE — 11042 DEBRIDEMENT: ICD-10-PCS | Mod: ,,, | Performed by: FAMILY MEDICINE

## 2021-05-14 PROCEDURE — 88311 DECALCIFY TISSUE: CPT | Mod: 26,,, | Performed by: PATHOLOGY

## 2021-05-14 PROCEDURE — 99214 OFFICE O/P EST MOD 30 MIN: CPT | Mod: 25,,, | Performed by: FAMILY MEDICINE

## 2021-05-14 PROCEDURE — 99214 PR OFFICE/OUTPT VISIT, EST, LEVL IV, 30-39 MIN: ICD-10-PCS | Mod: 25,,, | Performed by: FAMILY MEDICINE

## 2021-05-14 PROCEDURE — 87070 CULTURE OTHR SPECIMN AEROBIC: CPT | Performed by: FAMILY MEDICINE

## 2021-05-14 PROCEDURE — 88311 PR  DECALCIFY TISSUE: ICD-10-PCS | Mod: 26,,, | Performed by: PATHOLOGY

## 2021-05-14 PROCEDURE — 88311 DECALCIFY TISSUE: CPT | Performed by: PATHOLOGY

## 2021-05-14 PROCEDURE — 87077 CULTURE AEROBIC IDENTIFY: CPT | Mod: 59 | Performed by: FAMILY MEDICINE

## 2021-05-14 PROCEDURE — 11042 DBRDMT SUBQ TIS 1ST 20SQCM/<: CPT | Mod: ,,, | Performed by: FAMILY MEDICINE

## 2021-05-14 RX ORDER — CIPROFLOXACIN 500 MG/1
500 TABLET ORAL EVERY 12 HOURS
Qty: 28 TABLET | Refills: 0 | Status: SHIPPED | OUTPATIENT
Start: 2021-05-14 | End: 2021-05-28

## 2021-05-16 LAB
BACTERIA TISS AEROBE CULT: ABNORMAL
BACTERIA TISS AEROBE CULT: ABNORMAL
GRAM STN SPEC: ABNORMAL
GRAM STN SPEC: ABNORMAL

## 2021-05-17 ENCOUNTER — TELEPHONE (OUTPATIENT)
Dept: WOUND CARE | Facility: HOSPITAL | Age: 56
End: 2021-05-17

## 2021-05-17 DIAGNOSIS — M86.9 OSTEOMYELITIS, UNSPECIFIED SITE, UNSPECIFIED TYPE: Primary | ICD-10-CM

## 2021-05-17 DIAGNOSIS — E11.621 DIABETIC ULCER OF LEFT MIDFOOT ASSOCIATED WITH TYPE 2 DIABETES MELLITUS, WITH BONE INVOLVEMENT WITHOUT EVIDENCE OF NECROSIS: ICD-10-CM

## 2021-05-17 DIAGNOSIS — L97.426 DIABETIC ULCER OF LEFT MIDFOOT ASSOCIATED WITH TYPE 2 DIABETES MELLITUS, WITH BONE INVOLVEMENT WITHOUT EVIDENCE OF NECROSIS: ICD-10-CM

## 2021-05-17 RX ORDER — CEFDINIR 300 MG/1
300 CAPSULE ORAL 2 TIMES DAILY
Qty: 28 CAPSULE | Refills: 0 | Status: SHIPPED | OUTPATIENT
Start: 2021-05-17 | End: 2021-05-31

## 2021-05-18 LAB
FINAL PATHOLOGIC DIAGNOSIS: NORMAL
GROSS: NORMAL
Lab: NORMAL

## 2021-05-19 ENCOUNTER — HOSPITAL ENCOUNTER (OUTPATIENT)
Dept: WOUND CARE | Facility: HOSPITAL | Age: 56
Discharge: HOME | End: 2021-05-19
Attending: PODIATRIST
Payer: COMMERCIAL

## 2021-05-19 VITALS
TEMPERATURE: 98 F | SYSTOLIC BLOOD PRESSURE: 140 MMHG | HEART RATE: 91 BPM | RESPIRATION RATE: 20 BRPM | DIASTOLIC BLOOD PRESSURE: 67 MMHG

## 2021-05-19 DIAGNOSIS — L97.426 DIABETIC ULCER OF LEFT MIDFOOT ASSOCIATED WITH TYPE 2 DIABETES MELLITUS, WITH BONE INVOLVEMENT WITHOUT EVIDENCE OF NECROSIS: Primary | ICD-10-CM

## 2021-05-19 DIAGNOSIS — E11.621 DIABETIC ULCER OF LEFT MIDFOOT ASSOCIATED WITH TYPE 2 DIABETES MELLITUS, WITH BONE INVOLVEMENT WITHOUT EVIDENCE OF NECROSIS: Primary | ICD-10-CM

## 2021-05-19 PROCEDURE — 29581 APPL MULTLAYER CMPRN SYS LEG: CPT

## 2021-05-20 ENCOUNTER — PATIENT OUTREACH (OUTPATIENT)
Dept: ADMINISTRATIVE | Facility: OTHER | Age: 56
End: 2021-05-20

## 2021-05-20 DIAGNOSIS — E11.9 TYPE 2 DIABETES MELLITUS WITHOUT COMPLICATION, WITH LONG-TERM CURRENT USE OF INSULIN: Primary | ICD-10-CM

## 2021-05-20 DIAGNOSIS — Z79.4 TYPE 2 DIABETES MELLITUS WITHOUT COMPLICATION, WITH LONG-TERM CURRENT USE OF INSULIN: Primary | ICD-10-CM

## 2021-05-21 ENCOUNTER — HOSPITAL ENCOUNTER (OUTPATIENT)
Dept: WOUND CARE | Facility: HOSPITAL | Age: 56
Discharge: HOME | End: 2021-05-21
Attending: PODIATRIST
Payer: COMMERCIAL

## 2021-05-21 VITALS
HEIGHT: 73 IN | WEIGHT: 270.06 LBS | SYSTOLIC BLOOD PRESSURE: 136 MMHG | HEART RATE: 85 BPM | TEMPERATURE: 98 F | DIASTOLIC BLOOD PRESSURE: 67 MMHG | BODY MASS INDEX: 35.79 KG/M2

## 2021-05-21 DIAGNOSIS — L97.426 DIABETIC ULCER OF LEFT MIDFOOT ASSOCIATED WITH TYPE 2 DIABETES MELLITUS, WITH BONE INVOLVEMENT WITHOUT EVIDENCE OF NECROSIS: Primary | ICD-10-CM

## 2021-05-21 DIAGNOSIS — E11.621 DIABETIC ULCER OF LEFT MIDFOOT ASSOCIATED WITH TYPE 2 DIABETES MELLITUS, WITH BONE INVOLVEMENT WITHOUT EVIDENCE OF NECROSIS: Primary | ICD-10-CM

## 2021-05-21 PROCEDURE — 11044 DBRDMT BONE 1ST 20 SQ CM/<: CPT

## 2021-05-21 PROCEDURE — 29581 APPL MULTLAYER CMPRN SYS LEG: CPT

## 2021-05-21 PROCEDURE — 11044 WOUND DEBRIDEMENT: ICD-10-PCS | Mod: ,,, | Performed by: PODIATRIST

## 2021-05-21 PROCEDURE — 99499 UNLISTED E&M SERVICE: CPT | Mod: ,,, | Performed by: PODIATRIST

## 2021-05-21 PROCEDURE — 99499 NO LOS: ICD-10-PCS | Mod: ,,, | Performed by: PODIATRIST

## 2021-05-21 PROCEDURE — 11044 DBRDMT BONE 1ST 20 SQ CM/<: CPT | Mod: ,,, | Performed by: PODIATRIST

## 2021-05-25 ENCOUNTER — LAB VISIT (OUTPATIENT)
Dept: LAB | Facility: HOSPITAL | Age: 56
End: 2021-05-25
Attending: FAMILY MEDICINE
Payer: COMMERCIAL

## 2021-05-25 ENCOUNTER — OFFICE VISIT (OUTPATIENT)
Dept: FAMILY MEDICINE | Facility: CLINIC | Age: 56
End: 2021-05-25
Payer: COMMERCIAL

## 2021-05-25 VITALS
SYSTOLIC BLOOD PRESSURE: 160 MMHG | OXYGEN SATURATION: 97 % | HEART RATE: 86 BPM | RESPIRATION RATE: 20 BRPM | TEMPERATURE: 98 F | WEIGHT: 290.56 LBS | BODY MASS INDEX: 38.51 KG/M2 | HEIGHT: 73 IN | DIASTOLIC BLOOD PRESSURE: 86 MMHG

## 2021-05-25 DIAGNOSIS — E11.22 TYPE 2 DIABETES MELLITUS WITH STAGE 4 CHRONIC KIDNEY DISEASE, WITH LONG-TERM CURRENT USE OF INSULIN: Primary | ICD-10-CM

## 2021-05-25 DIAGNOSIS — L97.426 DIABETIC ULCER OF LEFT MIDFOOT ASSOCIATED WITH TYPE 2 DIABETES MELLITUS, WITH BONE INVOLVEMENT WITHOUT EVIDENCE OF NECROSIS: ICD-10-CM

## 2021-05-25 DIAGNOSIS — N18.4 TYPE 2 DIABETES MELLITUS WITH STAGE 4 CHRONIC KIDNEY DISEASE, WITH LONG-TERM CURRENT USE OF INSULIN: Primary | ICD-10-CM

## 2021-05-25 DIAGNOSIS — Z79.4 TYPE 2 DIABETES MELLITUS WITH STAGE 4 CHRONIC KIDNEY DISEASE, WITH LONG-TERM CURRENT USE OF INSULIN: Primary | ICD-10-CM

## 2021-05-25 DIAGNOSIS — E11.621 DIABETIC ULCER OF LEFT MIDFOOT ASSOCIATED WITH TYPE 2 DIABETES MELLITUS, WITH BONE INVOLVEMENT WITHOUT EVIDENCE OF NECROSIS: ICD-10-CM

## 2021-05-25 DIAGNOSIS — E11.22 TYPE 2 DIABETES MELLITUS WITH STAGE 4 CHRONIC KIDNEY DISEASE, WITH LONG-TERM CURRENT USE OF INSULIN: ICD-10-CM

## 2021-05-25 DIAGNOSIS — Z12.12 ENCOUNTER FOR COLORECTAL CANCER SCREENING: ICD-10-CM

## 2021-05-25 DIAGNOSIS — N18.4 TYPE 2 DIABETES MELLITUS WITH STAGE 4 CHRONIC KIDNEY DISEASE, WITH LONG-TERM CURRENT USE OF INSULIN: ICD-10-CM

## 2021-05-25 DIAGNOSIS — Z79.4 TYPE 2 DIABETES MELLITUS WITH STAGE 4 CHRONIC KIDNEY DISEASE, WITH LONG-TERM CURRENT USE OF INSULIN: ICD-10-CM

## 2021-05-25 DIAGNOSIS — E78.5 HYPERLIPIDEMIA, ACQUIRED: Chronic | ICD-10-CM

## 2021-05-25 DIAGNOSIS — Z12.11 ENCOUNTER FOR COLORECTAL CANCER SCREENING: ICD-10-CM

## 2021-05-25 DIAGNOSIS — I89.0 LYMPHEDEMA OF BOTH LOWER EXTREMITIES: ICD-10-CM

## 2021-05-25 DIAGNOSIS — I10 ESSENTIAL HYPERTENSION: Chronic | ICD-10-CM

## 2021-05-25 PROCEDURE — 99214 PR OFFICE/OUTPT VISIT, EST, LEVL IV, 30-39 MIN: ICD-10-PCS | Mod: S$GLB,,, | Performed by: FAMILY MEDICINE

## 2021-05-25 PROCEDURE — 1126F AMNT PAIN NOTED NONE PRSNT: CPT | Mod: S$GLB,,, | Performed by: FAMILY MEDICINE

## 2021-05-25 PROCEDURE — 3008F BODY MASS INDEX DOCD: CPT | Mod: CPTII,S$GLB,, | Performed by: FAMILY MEDICINE

## 2021-05-25 PROCEDURE — 36415 COLL VENOUS BLD VENIPUNCTURE: CPT | Mod: PO | Performed by: FAMILY MEDICINE

## 2021-05-25 PROCEDURE — 3077F SYST BP >= 140 MM HG: CPT | Mod: CPTII,S$GLB,, | Performed by: FAMILY MEDICINE

## 2021-05-25 PROCEDURE — 99999 PR PBB SHADOW E&M-EST. PATIENT-LVL IV: ICD-10-PCS | Mod: PBBFAC,,, | Performed by: FAMILY MEDICINE

## 2021-05-25 PROCEDURE — 83036 HEMOGLOBIN GLYCOSYLATED A1C: CPT | Performed by: FAMILY MEDICINE

## 2021-05-25 PROCEDURE — 3079F PR MOST RECENT DIASTOLIC BLOOD PRESSURE 80-89 MM HG: ICD-10-PCS | Mod: CPTII,S$GLB,, | Performed by: FAMILY MEDICINE

## 2021-05-25 PROCEDURE — 3044F PR MOST RECENT HEMOGLOBIN A1C LEVEL <7.0%: ICD-10-PCS | Mod: CPTII,S$GLB,, | Performed by: FAMILY MEDICINE

## 2021-05-25 PROCEDURE — 99214 OFFICE O/P EST MOD 30 MIN: CPT | Mod: S$GLB,,, | Performed by: FAMILY MEDICINE

## 2021-05-25 PROCEDURE — 3077F PR MOST RECENT SYSTOLIC BLOOD PRESSURE >= 140 MM HG: ICD-10-PCS | Mod: CPTII,S$GLB,, | Performed by: FAMILY MEDICINE

## 2021-05-25 PROCEDURE — 3044F HG A1C LEVEL LT 7.0%: CPT | Mod: CPTII,S$GLB,, | Performed by: FAMILY MEDICINE

## 2021-05-25 PROCEDURE — 80048 BASIC METABOLIC PNL TOTAL CA: CPT | Performed by: FAMILY MEDICINE

## 2021-05-25 PROCEDURE — 99999 PR PBB SHADOW E&M-EST. PATIENT-LVL IV: CPT | Mod: PBBFAC,,, | Performed by: FAMILY MEDICINE

## 2021-05-25 PROCEDURE — 1126F PR PAIN SEVERITY QUANTIFIED, NO PAIN PRESENT: ICD-10-PCS | Mod: S$GLB,,, | Performed by: FAMILY MEDICINE

## 2021-05-25 PROCEDURE — 3079F DIAST BP 80-89 MM HG: CPT | Mod: CPTII,S$GLB,, | Performed by: FAMILY MEDICINE

## 2021-05-25 PROCEDURE — 3008F PR BODY MASS INDEX (BMI) DOCUMENTED: ICD-10-PCS | Mod: CPTII,S$GLB,, | Performed by: FAMILY MEDICINE

## 2021-05-25 RX ORDER — CARVEDILOL 25 MG/1
25 TABLET ORAL 2 TIMES DAILY
Qty: 180 TABLET | Refills: 3 | Status: SHIPPED | OUTPATIENT
Start: 2021-05-25 | End: 2021-08-16 | Stop reason: SDUPTHER

## 2021-05-25 RX ORDER — ATORVASTATIN CALCIUM 40 MG/1
40 TABLET, FILM COATED ORAL DAILY
Qty: 90 TABLET | Refills: 3 | Status: SHIPPED | OUTPATIENT
Start: 2021-05-25 | End: 2022-02-23 | Stop reason: ALTCHOICE

## 2021-05-25 RX ORDER — TORSEMIDE 20 MG/1
TABLET ORAL
Qty: 120 TABLET | Refills: 2 | Status: SHIPPED | OUTPATIENT
Start: 2021-05-25 | End: 2021-05-25 | Stop reason: SDUPTHER

## 2021-05-25 RX ORDER — DULAGLUTIDE 0.75 MG/.5ML
INJECTION, SOLUTION SUBCUTANEOUS
Qty: 1 PEN | Refills: 5 | Status: SHIPPED | OUTPATIENT
Start: 2021-05-25 | End: 2021-11-15 | Stop reason: SDUPTHER

## 2021-05-25 RX ORDER — TORSEMIDE 20 MG/1
TABLET ORAL
Qty: 120 TABLET | Refills: 2 | Status: ON HOLD | OUTPATIENT
Start: 2021-05-25 | End: 2021-08-02 | Stop reason: SDUPTHER

## 2021-05-25 RX ORDER — AMLODIPINE BESYLATE 10 MG/1
10 TABLET ORAL DAILY
Qty: 90 TABLET | Refills: 3 | Status: SHIPPED | OUTPATIENT
Start: 2021-05-25 | End: 2021-06-04

## 2021-05-26 ENCOUNTER — HOSPITAL ENCOUNTER (OUTPATIENT)
Dept: WOUND CARE | Facility: HOSPITAL | Age: 56
Discharge: HOME | End: 2021-05-26
Attending: FAMILY MEDICINE
Payer: COMMERCIAL

## 2021-05-26 ENCOUNTER — TELEPHONE (OUTPATIENT)
Dept: FAMILY MEDICINE | Facility: CLINIC | Age: 56
End: 2021-05-26

## 2021-05-26 VITALS
HEART RATE: 81 BPM | SYSTOLIC BLOOD PRESSURE: 167 MMHG | DIASTOLIC BLOOD PRESSURE: 80 MMHG | RESPIRATION RATE: 20 BRPM | TEMPERATURE: 98 F

## 2021-05-26 DIAGNOSIS — E11.621 DIABETIC ULCER OF LEFT MIDFOOT ASSOCIATED WITH TYPE 2 DIABETES MELLITUS, WITH BONE INVOLVEMENT WITHOUT EVIDENCE OF NECROSIS: Primary | ICD-10-CM

## 2021-05-26 DIAGNOSIS — Z79.4 TYPE 2 DIABETES MELLITUS WITH STAGE 5 CHRONIC KIDNEY DISEASE NOT ON CHRONIC DIALYSIS, WITH LONG-TERM CURRENT USE OF INSULIN: Primary | ICD-10-CM

## 2021-05-26 DIAGNOSIS — L97.426 DIABETIC ULCER OF LEFT MIDFOOT ASSOCIATED WITH TYPE 2 DIABETES MELLITUS, WITH BONE INVOLVEMENT WITHOUT EVIDENCE OF NECROSIS: Primary | ICD-10-CM

## 2021-05-26 DIAGNOSIS — N18.5 TYPE 2 DIABETES MELLITUS WITH STAGE 5 CHRONIC KIDNEY DISEASE NOT ON CHRONIC DIALYSIS, WITH LONG-TERM CURRENT USE OF INSULIN: Primary | ICD-10-CM

## 2021-05-26 DIAGNOSIS — E11.22 TYPE 2 DIABETES MELLITUS WITH STAGE 5 CHRONIC KIDNEY DISEASE NOT ON CHRONIC DIALYSIS, WITH LONG-TERM CURRENT USE OF INSULIN: Primary | ICD-10-CM

## 2021-05-26 LAB
ANION GAP SERPL CALC-SCNC: 10 MMOL/L (ref 8–16)
BUN SERPL-MCNC: 77 MG/DL (ref 6–20)
CALCIUM SERPL-MCNC: 8.4 MG/DL (ref 8.7–10.5)
CHLORIDE SERPL-SCNC: 104 MMOL/L (ref 95–110)
CO2 SERPL-SCNC: 19 MMOL/L (ref 23–29)
CREAT SERPL-MCNC: 5.2 MG/DL (ref 0.5–1.4)
EST. GFR  (AFRICAN AMERICAN): 13.1 ML/MIN/1.73 M^2
EST. GFR  (NON AFRICAN AMERICAN): 11.3 ML/MIN/1.73 M^2
ESTIMATED AVG GLUCOSE: 131 MG/DL (ref 68–131)
GLUCOSE SERPL-MCNC: 116 MG/DL (ref 70–110)
HBA1C MFR BLD: 6.2 % (ref 4–5.6)
POTASSIUM SERPL-SCNC: 4.7 MMOL/L (ref 3.5–5.1)
SODIUM SERPL-SCNC: 133 MMOL/L (ref 136–145)

## 2021-05-26 PROCEDURE — 29581 APPL MULTLAYER CMPRN SYS LEG: CPT

## 2021-05-27 ENCOUNTER — TELEPHONE (OUTPATIENT)
Dept: FAMILY MEDICINE | Facility: CLINIC | Age: 56
End: 2021-05-27

## 2021-05-28 ENCOUNTER — HOSPITAL ENCOUNTER (OUTPATIENT)
Dept: WOUND CARE | Facility: HOSPITAL | Age: 56
Discharge: HOME | End: 2021-05-28
Attending: PODIATRIST
Payer: COMMERCIAL

## 2021-05-28 VITALS — HEART RATE: 87 BPM | SYSTOLIC BLOOD PRESSURE: 202 MMHG | TEMPERATURE: 98 F | DIASTOLIC BLOOD PRESSURE: 93 MMHG

## 2021-05-28 DIAGNOSIS — E11.621 DIABETIC ULCER OF LEFT FOOT ASSOCIATED WITH TYPE 2 DIABETES MELLITUS, WITH BONE INVOLVEMENT WITHOUT EVIDENCE OF NECROSIS: Primary | ICD-10-CM

## 2021-05-28 DIAGNOSIS — L97.526 DIABETIC ULCER OF LEFT FOOT ASSOCIATED WITH TYPE 2 DIABETES MELLITUS, WITH BONE INVOLVEMENT WITHOUT EVIDENCE OF NECROSIS: Primary | ICD-10-CM

## 2021-05-28 PROCEDURE — 29581 APPL MULTLAYER CMPRN SYS LEG: CPT

## 2021-05-28 PROCEDURE — 99214 OFFICE O/P EST MOD 30 MIN: CPT | Mod: ,,, | Performed by: PODIATRIST

## 2021-05-28 PROCEDURE — 99214 PR OFFICE/OUTPT VISIT, EST, LEVL IV, 30-39 MIN: ICD-10-PCS | Mod: ,,, | Performed by: PODIATRIST

## 2021-06-01 ENCOUNTER — HOSPITAL ENCOUNTER (OUTPATIENT)
Dept: WOUND CARE | Facility: HOSPITAL | Age: 56
Discharge: HOME | End: 2021-06-01
Attending: PODIATRIST
Payer: COMMERCIAL

## 2021-06-01 VITALS — SYSTOLIC BLOOD PRESSURE: 169 MMHG | DIASTOLIC BLOOD PRESSURE: 75 MMHG | HEART RATE: 87 BPM | TEMPERATURE: 98 F

## 2021-06-01 DIAGNOSIS — L97.526 DIABETIC ULCER OF LEFT FOOT ASSOCIATED WITH TYPE 2 DIABETES MELLITUS, WITH BONE INVOLVEMENT WITHOUT EVIDENCE OF NECROSIS: Primary | ICD-10-CM

## 2021-06-01 DIAGNOSIS — E11.621 DIABETIC ULCER OF LEFT FOOT ASSOCIATED WITH TYPE 2 DIABETES MELLITUS, WITH BONE INVOLVEMENT WITHOUT EVIDENCE OF NECROSIS: Primary | ICD-10-CM

## 2021-06-01 PROCEDURE — 29581 APPL MULTLAYER CMPRN SYS LEG: CPT

## 2021-06-03 ENCOUNTER — TELEPHONE (OUTPATIENT)
Dept: NEPHROLOGY | Facility: CLINIC | Age: 56
End: 2021-06-03

## 2021-06-04 ENCOUNTER — LAB VISIT (OUTPATIENT)
Dept: LAB | Facility: HOSPITAL | Age: 56
End: 2021-06-04
Payer: COMMERCIAL

## 2021-06-04 ENCOUNTER — OFFICE VISIT (OUTPATIENT)
Dept: INFECTIOUS DISEASES | Facility: CLINIC | Age: 56
End: 2021-06-04
Payer: COMMERCIAL

## 2021-06-04 ENCOUNTER — HOSPITAL ENCOUNTER (OUTPATIENT)
Dept: WOUND CARE | Facility: HOSPITAL | Age: 56
Discharge: HOME | End: 2021-06-04
Attending: PODIATRIST
Payer: COMMERCIAL

## 2021-06-04 ENCOUNTER — OFFICE VISIT (OUTPATIENT)
Dept: NEPHROLOGY | Facility: CLINIC | Age: 56
End: 2021-06-04
Payer: COMMERCIAL

## 2021-06-04 VITALS
HEART RATE: 80 BPM | SYSTOLIC BLOOD PRESSURE: 154 MMHG | RESPIRATION RATE: 20 BRPM | DIASTOLIC BLOOD PRESSURE: 74 MMHG | TEMPERATURE: 98 F

## 2021-06-04 VITALS
SYSTOLIC BLOOD PRESSURE: 145 MMHG | HEART RATE: 79 BPM | TEMPERATURE: 99 F | DIASTOLIC BLOOD PRESSURE: 76 MMHG | BODY MASS INDEX: 36.2 KG/M2 | WEIGHT: 273.13 LBS | HEIGHT: 73 IN

## 2021-06-04 VITALS
HEIGHT: 73 IN | OXYGEN SATURATION: 97 % | BODY MASS INDEX: 36.11 KG/M2 | WEIGHT: 272.5 LBS | SYSTOLIC BLOOD PRESSURE: 160 MMHG | HEART RATE: 78 BPM | DIASTOLIC BLOOD PRESSURE: 82 MMHG

## 2021-06-04 DIAGNOSIS — N18.4 CHRONIC KIDNEY DISEASE, STAGE IV (SEVERE): Primary | ICD-10-CM

## 2021-06-04 DIAGNOSIS — E11.22 TYPE 2 DIABETES MELLITUS WITH STAGE 5 CHRONIC KIDNEY DISEASE NOT ON CHRONIC DIALYSIS, WITH LONG-TERM CURRENT USE OF INSULIN: ICD-10-CM

## 2021-06-04 DIAGNOSIS — D63.1 ANEMIA OF RENAL DISEASE: ICD-10-CM

## 2021-06-04 DIAGNOSIS — E11.621 DIABETIC ULCER OF LEFT FOOT ASSOCIATED WITH TYPE 2 DIABETES MELLITUS, WITH BONE INVOLVEMENT WITHOUT EVIDENCE OF NECROSIS: Primary | ICD-10-CM

## 2021-06-04 DIAGNOSIS — N18.5 TYPE 2 DIABETES MELLITUS WITH STAGE 5 CHRONIC KIDNEY DISEASE NOT ON CHRONIC DIALYSIS, WITH LONG-TERM CURRENT USE OF INSULIN: ICD-10-CM

## 2021-06-04 DIAGNOSIS — Z79.4 TYPE 2 DIABETES MELLITUS WITH STAGE 5 CHRONIC KIDNEY DISEASE NOT ON CHRONIC DIALYSIS, WITH LONG-TERM CURRENT USE OF INSULIN: ICD-10-CM

## 2021-06-04 DIAGNOSIS — I10 ESSENTIAL HYPERTENSION: ICD-10-CM

## 2021-06-04 DIAGNOSIS — M86.9 OSTEOMYELITIS, UNSPECIFIED SITE, UNSPECIFIED TYPE: ICD-10-CM

## 2021-06-04 DIAGNOSIS — M86.9 OSTEOMYELITIS OF LEFT FOOT, UNSPECIFIED TYPE: ICD-10-CM

## 2021-06-04 DIAGNOSIS — M86.9 OSTEOMYELITIS OF LEFT FOOT, UNSPECIFIED TYPE: Primary | ICD-10-CM

## 2021-06-04 DIAGNOSIS — L97.526 DIABETIC ULCER OF LEFT FOOT ASSOCIATED WITH TYPE 2 DIABETES MELLITUS, WITH BONE INVOLVEMENT WITHOUT EVIDENCE OF NECROSIS: Primary | ICD-10-CM

## 2021-06-04 DIAGNOSIS — L97.426 DIABETIC ULCER OF LEFT MIDFOOT ASSOCIATED WITH TYPE 2 DIABETES MELLITUS, WITH BONE INVOLVEMENT WITHOUT EVIDENCE OF NECROSIS: ICD-10-CM

## 2021-06-04 DIAGNOSIS — Z79.2 RECEIVING INTRAVENOUS ANTIBIOTIC TREATMENT AS OUTPATIENT: ICD-10-CM

## 2021-06-04 DIAGNOSIS — N18.9 ANEMIA OF RENAL DISEASE: ICD-10-CM

## 2021-06-04 DIAGNOSIS — E55.9 VITAMIN D DEFICIENCY: ICD-10-CM

## 2021-06-04 DIAGNOSIS — E11.621 DIABETIC ULCER OF LEFT MIDFOOT ASSOCIATED WITH TYPE 2 DIABETES MELLITUS, WITH BONE INVOLVEMENT WITHOUT EVIDENCE OF NECROSIS: ICD-10-CM

## 2021-06-04 LAB
ALBUMIN SERPL BCP-MCNC: 2.7 G/DL (ref 3.5–5.2)
ALP SERPL-CCNC: 493 U/L (ref 55–135)
ALT SERPL W/O P-5'-P-CCNC: 50 U/L (ref 10–44)
ANION GAP SERPL CALC-SCNC: 12 MMOL/L (ref 8–16)
AST SERPL-CCNC: 64 U/L (ref 10–40)
BASOPHILS # BLD AUTO: 0.03 K/UL (ref 0–0.2)
BASOPHILS NFR BLD: 0.6 % (ref 0–1.9)
BILIRUB SERPL-MCNC: 0.5 MG/DL (ref 0.1–1)
BUN SERPL-MCNC: 74 MG/DL (ref 6–20)
CALCIUM SERPL-MCNC: 8.9 MG/DL (ref 8.7–10.5)
CHLORIDE SERPL-SCNC: 105 MMOL/L (ref 95–110)
CO2 SERPL-SCNC: 22 MMOL/L (ref 23–29)
CREAT SERPL-MCNC: 5.7 MG/DL (ref 0.5–1.4)
CRP SERPL-MCNC: 6.3 MG/L (ref 0–8.2)
DIFFERENTIAL METHOD: ABNORMAL
EOSINOPHIL # BLD AUTO: 0.2 K/UL (ref 0–0.5)
EOSINOPHIL NFR BLD: 3.3 % (ref 0–8)
ERYTHROCYTE [DISTWIDTH] IN BLOOD BY AUTOMATED COUNT: 14.4 % (ref 11.5–14.5)
ERYTHROCYTE [SEDIMENTATION RATE] IN BLOOD BY WESTERGREN METHOD: 106 MM/HR (ref 0–23)
EST. GFR  (AFRICAN AMERICAN): 11.7 ML/MIN/1.73 M^2
EST. GFR  (NON AFRICAN AMERICAN): 10.1 ML/MIN/1.73 M^2
GLUCOSE SERPL-MCNC: 99 MG/DL (ref 70–110)
HCT VFR BLD AUTO: 30.2 % (ref 40–54)
HGB BLD-MCNC: 9.5 G/DL (ref 14–18)
IMM GRANULOCYTES # BLD AUTO: 0.02 K/UL (ref 0–0.04)
IMM GRANULOCYTES NFR BLD AUTO: 0.4 % (ref 0–0.5)
LYMPHOCYTES # BLD AUTO: 1 K/UL (ref 1–4.8)
LYMPHOCYTES NFR BLD: 18.6 % (ref 18–48)
MCH RBC QN AUTO: 26 PG (ref 27–31)
MCHC RBC AUTO-ENTMCNC: 31.5 G/DL (ref 32–36)
MCV RBC AUTO: 83 FL (ref 82–98)
MONOCYTES # BLD AUTO: 0.7 K/UL (ref 0.3–1)
MONOCYTES NFR BLD: 13 % (ref 4–15)
NEUTROPHILS # BLD AUTO: 3.3 K/UL (ref 1.8–7.7)
NEUTROPHILS NFR BLD: 64.1 % (ref 38–73)
NRBC BLD-RTO: 0 /100 WBC
PLATELET # BLD AUTO: 219 K/UL (ref 150–450)
PMV BLD AUTO: 9.7 FL (ref 9.2–12.9)
POTASSIUM SERPL-SCNC: 4.9 MMOL/L (ref 3.5–5.1)
PROT SERPL-MCNC: 7.2 G/DL (ref 6–8.4)
RBC # BLD AUTO: 3.65 M/UL (ref 4.6–6.2)
SODIUM SERPL-SCNC: 139 MMOL/L (ref 136–145)
WBC # BLD AUTO: 5.16 K/UL (ref 3.9–12.7)

## 2021-06-04 PROCEDURE — 85652 RBC SED RATE AUTOMATED: CPT | Performed by: NURSE PRACTITIONER

## 2021-06-04 PROCEDURE — 3008F PR BODY MASS INDEX (BMI) DOCUMENTED: ICD-10-PCS | Mod: CPTII,S$GLB,, | Performed by: NURSE PRACTITIONER

## 2021-06-04 PROCEDURE — 99999 PR PBB SHADOW E&M-EST. PATIENT-LVL V: CPT | Mod: PBBFAC,,, | Performed by: NURSE PRACTITIONER

## 2021-06-04 PROCEDURE — 99214 OFFICE O/P EST MOD 30 MIN: CPT | Mod: S$GLB,,, | Performed by: NURSE PRACTITIONER

## 2021-06-04 PROCEDURE — 1126F PR PAIN SEVERITY QUANTIFIED, NO PAIN PRESENT: ICD-10-PCS | Mod: S$GLB,,, | Performed by: INTERNAL MEDICINE

## 2021-06-04 PROCEDURE — 85025 COMPLETE CBC W/AUTO DIFF WBC: CPT | Performed by: NURSE PRACTITIONER

## 2021-06-04 PROCEDURE — 80053 COMPREHEN METABOLIC PANEL: CPT | Performed by: NURSE PRACTITIONER

## 2021-06-04 PROCEDURE — 3044F PR MOST RECENT HEMOGLOBIN A1C LEVEL <7.0%: ICD-10-PCS | Mod: CPTII,S$GLB,, | Performed by: INTERNAL MEDICINE

## 2021-06-04 PROCEDURE — 99999 PR PBB SHADOW E&M-EST. PATIENT-LVL IV: CPT | Mod: PBBFAC,,, | Performed by: INTERNAL MEDICINE

## 2021-06-04 PROCEDURE — 3077F PR MOST RECENT SYSTOLIC BLOOD PRESSURE >= 140 MM HG: ICD-10-PCS | Mod: CPTII,S$GLB,, | Performed by: INTERNAL MEDICINE

## 2021-06-04 PROCEDURE — 1126F PR PAIN SEVERITY QUANTIFIED, NO PAIN PRESENT: ICD-10-PCS | Mod: S$GLB,,, | Performed by: NURSE PRACTITIONER

## 2021-06-04 PROCEDURE — 3044F HG A1C LEVEL LT 7.0%: CPT | Mod: CPTII,S$GLB,, | Performed by: INTERNAL MEDICINE

## 2021-06-04 PROCEDURE — 3079F DIAST BP 80-89 MM HG: CPT | Mod: CPTII,S$GLB,, | Performed by: INTERNAL MEDICINE

## 2021-06-04 PROCEDURE — 1126F AMNT PAIN NOTED NONE PRSNT: CPT | Mod: S$GLB,,, | Performed by: NURSE PRACTITIONER

## 2021-06-04 PROCEDURE — 36415 COLL VENOUS BLD VENIPUNCTURE: CPT | Performed by: NURSE PRACTITIONER

## 2021-06-04 PROCEDURE — 99999 PR PBB SHADOW E&M-EST. PATIENT-LVL IV: ICD-10-PCS | Mod: PBBFAC,,, | Performed by: INTERNAL MEDICINE

## 2021-06-04 PROCEDURE — 86140 C-REACTIVE PROTEIN: CPT | Performed by: NURSE PRACTITIONER

## 2021-06-04 PROCEDURE — 99213 OFFICE O/P EST LOW 20 MIN: CPT | Performed by: PODIATRIST

## 2021-06-04 PROCEDURE — 99214 PR OFFICE/OUTPT VISIT, EST, LEVL IV, 30-39 MIN: ICD-10-PCS | Mod: ,,, | Performed by: PODIATRIST

## 2021-06-04 PROCEDURE — 3008F BODY MASS INDEX DOCD: CPT | Mod: CPTII,S$GLB,, | Performed by: INTERNAL MEDICINE

## 2021-06-04 PROCEDURE — 3044F HG A1C LEVEL LT 7.0%: CPT | Mod: CPTII,S$GLB,, | Performed by: NURSE PRACTITIONER

## 2021-06-04 PROCEDURE — 3008F PR BODY MASS INDEX (BMI) DOCUMENTED: ICD-10-PCS | Mod: CPTII,S$GLB,, | Performed by: INTERNAL MEDICINE

## 2021-06-04 PROCEDURE — 99999 PR PBB SHADOW E&M-EST. PATIENT-LVL V: ICD-10-PCS | Mod: PBBFAC,,, | Performed by: NURSE PRACTITIONER

## 2021-06-04 PROCEDURE — 1126F AMNT PAIN NOTED NONE PRSNT: CPT | Mod: S$GLB,,, | Performed by: INTERNAL MEDICINE

## 2021-06-04 PROCEDURE — 3077F SYST BP >= 140 MM HG: CPT | Mod: CPTII,S$GLB,, | Performed by: INTERNAL MEDICINE

## 2021-06-04 PROCEDURE — 3008F BODY MASS INDEX DOCD: CPT | Mod: CPTII,S$GLB,, | Performed by: NURSE PRACTITIONER

## 2021-06-04 PROCEDURE — 99214 OFFICE O/P EST MOD 30 MIN: CPT | Mod: S$GLB,,, | Performed by: INTERNAL MEDICINE

## 2021-06-04 PROCEDURE — 99214 PR OFFICE/OUTPT VISIT, EST, LEVL IV, 30-39 MIN: ICD-10-PCS | Mod: S$GLB,,, | Performed by: INTERNAL MEDICINE

## 2021-06-04 PROCEDURE — 99214 PR OFFICE/OUTPT VISIT, EST, LEVL IV, 30-39 MIN: ICD-10-PCS | Mod: S$GLB,,, | Performed by: NURSE PRACTITIONER

## 2021-06-04 PROCEDURE — 3044F PR MOST RECENT HEMOGLOBIN A1C LEVEL <7.0%: ICD-10-PCS | Mod: CPTII,S$GLB,, | Performed by: NURSE PRACTITIONER

## 2021-06-04 PROCEDURE — 3079F PR MOST RECENT DIASTOLIC BLOOD PRESSURE 80-89 MM HG: ICD-10-PCS | Mod: CPTII,S$GLB,, | Performed by: INTERNAL MEDICINE

## 2021-06-04 PROCEDURE — 99214 OFFICE O/P EST MOD 30 MIN: CPT | Mod: ,,, | Performed by: PODIATRIST

## 2021-06-04 RX ORDER — ERGOCALCIFEROL 1.25 MG/1
50000 CAPSULE ORAL
Qty: 8 CAPSULE | Refills: 0 | Status: SHIPPED | OUTPATIENT
Start: 2021-06-04 | End: 2021-07-24

## 2021-06-04 RX ORDER — NIFEDIPINE 60 MG/1
60 TABLET, EXTENDED RELEASE ORAL DAILY
Qty: 30 TABLET | Refills: 2 | Status: ON HOLD | OUTPATIENT
Start: 2021-06-04 | End: 2021-08-02

## 2021-06-07 ENCOUNTER — TELEPHONE (OUTPATIENT)
Dept: INFECTIOUS DISEASES | Facility: HOSPITAL | Age: 56
End: 2021-06-07

## 2021-06-08 ENCOUNTER — HOSPITAL ENCOUNTER (OUTPATIENT)
Dept: WOUND CARE | Facility: HOSPITAL | Age: 56
Discharge: HOME | End: 2021-06-08
Attending: PODIATRIST
Payer: COMMERCIAL

## 2021-06-08 ENCOUNTER — TELEPHONE (OUTPATIENT)
Dept: INTERVENTIONAL RADIOLOGY/VASCULAR | Facility: HOSPITAL | Age: 56
End: 2021-06-08

## 2021-06-08 VITALS
DIASTOLIC BLOOD PRESSURE: 63 MMHG | SYSTOLIC BLOOD PRESSURE: 133 MMHG | TEMPERATURE: 98 F | HEART RATE: 86 BPM | HEIGHT: 73 IN | BODY MASS INDEX: 36.11 KG/M2 | WEIGHT: 272.5 LBS

## 2021-06-08 DIAGNOSIS — L97.526 DIABETIC ULCER OF LEFT FOOT ASSOCIATED WITH TYPE 2 DIABETES MELLITUS, WITH BONE INVOLVEMENT WITHOUT EVIDENCE OF NECROSIS: Primary | ICD-10-CM

## 2021-06-08 DIAGNOSIS — E11.621 DIABETIC ULCER OF LEFT FOOT ASSOCIATED WITH TYPE 2 DIABETES MELLITUS, WITH BONE INVOLVEMENT WITHOUT EVIDENCE OF NECROSIS: Primary | ICD-10-CM

## 2021-06-08 PROCEDURE — 29581 APPL MULTLAYER CMPRN SYS LEG: CPT

## 2021-06-10 ENCOUNTER — TELEPHONE (OUTPATIENT)
Dept: INFECTIOUS DISEASES | Facility: CLINIC | Age: 56
End: 2021-06-10

## 2021-06-10 VITALS — WEIGHT: 260 LBS | BODY MASS INDEX: 34.3 KG/M2

## 2021-06-11 ENCOUNTER — HOSPITAL ENCOUNTER (OUTPATIENT)
Facility: HOSPITAL | Age: 56
Discharge: HOME | End: 2021-06-11
Attending: RADIOLOGY | Admitting: RADIOLOGY
Payer: COMMERCIAL

## 2021-06-11 ENCOUNTER — HOSPITAL ENCOUNTER (OUTPATIENT)
Dept: INTERVENTIONAL RADIOLOGY/VASCULAR | Facility: HOSPITAL | Age: 56
Discharge: HOME | End: 2021-06-11
Attending: NURSE PRACTITIONER
Payer: COMMERCIAL

## 2021-06-11 VITALS — HEART RATE: 73 BPM

## 2021-06-11 DIAGNOSIS — E11.22 TYPE 2 DIABETES MELLITUS WITH STAGE 5 CHRONIC KIDNEY DISEASE NOT ON CHRONIC DIALYSIS, WITH LONG-TERM CURRENT USE OF INSULIN: ICD-10-CM

## 2021-06-11 DIAGNOSIS — Z79.4 TYPE 2 DIABETES MELLITUS WITH STAGE 5 CHRONIC KIDNEY DISEASE NOT ON CHRONIC DIALYSIS, WITH LONG-TERM CURRENT USE OF INSULIN: ICD-10-CM

## 2021-06-11 DIAGNOSIS — M86.9 OSTEOMYELITIS OF LEFT FOOT, UNSPECIFIED TYPE: ICD-10-CM

## 2021-06-11 DIAGNOSIS — N18.5 TYPE 2 DIABETES MELLITUS WITH STAGE 5 CHRONIC KIDNEY DISEASE NOT ON CHRONIC DIALYSIS, WITH LONG-TERM CURRENT USE OF INSULIN: ICD-10-CM

## 2021-06-11 DIAGNOSIS — Z79.2 RECEIVING INTRAVENOUS ANTIBIOTIC TREATMENT AS OUTPATIENT: ICD-10-CM

## 2021-06-11 PROCEDURE — 77001 FLUOROGUIDE FOR VEIN DEVICE: CPT | Mod: TC | Performed by: RADIOLOGY

## 2021-06-11 PROCEDURE — 76937 US GUIDE VASCULAR ACCESS: CPT | Mod: TC | Performed by: RADIOLOGY

## 2021-06-11 PROCEDURE — 76937 IR ULTRASOUND GUIDANCE: ICD-10-PCS | Mod: 26,,, | Performed by: RADIOLOGY

## 2021-06-11 PROCEDURE — 36558 INSERT TUNNELED CV CATH: CPT | Performed by: RADIOLOGY

## 2021-06-11 PROCEDURE — 77001 FLUOROGUIDE FOR VEIN DEVICE: CPT | Mod: 26,,, | Performed by: RADIOLOGY

## 2021-06-11 PROCEDURE — 36558 IR TUNNELED CATHETER INSERTION W/O PORT: ICD-10-PCS | Mod: RT,,, | Performed by: RADIOLOGY

## 2021-06-11 PROCEDURE — 77001 CHG FLUOROGUIDE CNTRL VEN ACCESS,PLACE,REPLACE,REMOVE: ICD-10-PCS | Mod: 26,,, | Performed by: RADIOLOGY

## 2021-06-22 ENCOUNTER — HOSPITAL ENCOUNTER (OUTPATIENT)
Dept: WOUND CARE | Facility: HOSPITAL | Age: 56
Discharge: HOME | End: 2021-06-22
Attending: PODIATRIST
Payer: COMMERCIAL

## 2021-06-22 VITALS
TEMPERATURE: 98 F | DIASTOLIC BLOOD PRESSURE: 84 MMHG | HEART RATE: 84 BPM | BODY MASS INDEX: 34.46 KG/M2 | WEIGHT: 260 LBS | SYSTOLIC BLOOD PRESSURE: 174 MMHG | HEIGHT: 73 IN

## 2021-06-22 DIAGNOSIS — L97.526 DIABETIC ULCER OF LEFT FOOT ASSOCIATED WITH TYPE 2 DIABETES MELLITUS, WITH BONE INVOLVEMENT WITHOUT EVIDENCE OF NECROSIS: Primary | ICD-10-CM

## 2021-06-22 DIAGNOSIS — E11.621 DIABETIC ULCER OF LEFT FOOT ASSOCIATED WITH TYPE 2 DIABETES MELLITUS, WITH BONE INVOLVEMENT WITHOUT EVIDENCE OF NECROSIS: Primary | ICD-10-CM

## 2021-06-22 PROCEDURE — 29581 APPL MULTLAYER CMPRN SYS LEG: CPT

## 2021-06-23 ENCOUNTER — TELEPHONE (OUTPATIENT)
Dept: INFECTIOUS DISEASES | Facility: CLINIC | Age: 56
End: 2021-06-23

## 2021-06-25 ENCOUNTER — PATIENT OUTREACH (OUTPATIENT)
Dept: ADMINISTRATIVE | Facility: OTHER | Age: 56
End: 2021-06-25

## 2021-06-25 ENCOUNTER — HOSPITAL ENCOUNTER (OUTPATIENT)
Dept: WOUND CARE | Facility: HOSPITAL | Age: 56
Discharge: HOME | End: 2021-06-25
Attending: PODIATRIST
Payer: COMMERCIAL

## 2021-06-25 VITALS
TEMPERATURE: 98 F | BODY MASS INDEX: 34.46 KG/M2 | HEIGHT: 73 IN | HEART RATE: 82 BPM | WEIGHT: 260 LBS | DIASTOLIC BLOOD PRESSURE: 63 MMHG | SYSTOLIC BLOOD PRESSURE: 127 MMHG

## 2021-06-25 DIAGNOSIS — L97.526 DIABETIC ULCER OF LEFT FOOT ASSOCIATED WITH TYPE 2 DIABETES MELLITUS, WITH BONE INVOLVEMENT WITHOUT EVIDENCE OF NECROSIS: Primary | ICD-10-CM

## 2021-06-25 DIAGNOSIS — E11.49 TYPE II DIABETES MELLITUS WITH NEUROLOGICAL MANIFESTATIONS: ICD-10-CM

## 2021-06-25 DIAGNOSIS — E11.621 DIABETIC ULCER OF LEFT FOOT ASSOCIATED WITH TYPE 2 DIABETES MELLITUS, WITH BONE INVOLVEMENT WITHOUT EVIDENCE OF NECROSIS: Primary | ICD-10-CM

## 2021-06-25 PROCEDURE — 11042 WOUND DEBRIDEMENT: ICD-10-PCS | Mod: ,,, | Performed by: PODIATRIST

## 2021-06-25 PROCEDURE — 11042 DBRDMT SUBQ TIS 1ST 20SQCM/<: CPT | Performed by: PODIATRIST

## 2021-06-25 PROCEDURE — 99499 UNLISTED E&M SERVICE: CPT | Mod: ,,, | Performed by: PODIATRIST

## 2021-06-25 PROCEDURE — 99499 NO LOS: ICD-10-PCS | Mod: ,,, | Performed by: PODIATRIST

## 2021-06-25 PROCEDURE — 11042 DBRDMT SUBQ TIS 1ST 20SQCM/<: CPT | Mod: ,,, | Performed by: PODIATRIST

## 2021-06-25 PROCEDURE — 27201912 HC WOUND CARE DEBRIDEMENT SUPPLIES: Performed by: PODIATRIST

## 2021-06-28 ENCOUNTER — OFFICE VISIT (OUTPATIENT)
Dept: INFECTIOUS DISEASES | Facility: CLINIC | Age: 56
End: 2021-06-28
Payer: COMMERCIAL

## 2021-06-28 VITALS
WEIGHT: 278.25 LBS | DIASTOLIC BLOOD PRESSURE: 94 MMHG | TEMPERATURE: 99 F | SYSTOLIC BLOOD PRESSURE: 192 MMHG | HEART RATE: 94 BPM | BODY MASS INDEX: 36.88 KG/M2 | HEIGHT: 73 IN

## 2021-06-28 DIAGNOSIS — Z79.2 RECEIVING INTRAVENOUS ANTIBIOTIC TREATMENT AS OUTPATIENT: ICD-10-CM

## 2021-06-28 DIAGNOSIS — N18.5 TYPE 2 DIABETES MELLITUS WITH STAGE 5 CHRONIC KIDNEY DISEASE NOT ON CHRONIC DIALYSIS, WITH LONG-TERM CURRENT USE OF INSULIN: ICD-10-CM

## 2021-06-28 DIAGNOSIS — M86.172 ACUTE OSTEOMYELITIS OF METATARSAL BONE OF LEFT FOOT: Primary | ICD-10-CM

## 2021-06-28 DIAGNOSIS — E11.22 TYPE 2 DIABETES MELLITUS WITH STAGE 5 CHRONIC KIDNEY DISEASE NOT ON CHRONIC DIALYSIS, WITH LONG-TERM CURRENT USE OF INSULIN: ICD-10-CM

## 2021-06-28 DIAGNOSIS — R19.7 ACUTE DIARRHEA: ICD-10-CM

## 2021-06-28 DIAGNOSIS — Z79.4 TYPE 2 DIABETES MELLITUS WITH STAGE 5 CHRONIC KIDNEY DISEASE NOT ON CHRONIC DIALYSIS, WITH LONG-TERM CURRENT USE OF INSULIN: ICD-10-CM

## 2021-06-28 PROCEDURE — 3008F BODY MASS INDEX DOCD: CPT | Mod: CPTII,S$GLB,, | Performed by: NURSE PRACTITIONER

## 2021-06-28 PROCEDURE — 1126F AMNT PAIN NOTED NONE PRSNT: CPT | Mod: S$GLB,,, | Performed by: NURSE PRACTITIONER

## 2021-06-28 PROCEDURE — 3008F PR BODY MASS INDEX (BMI) DOCUMENTED: ICD-10-PCS | Mod: CPTII,S$GLB,, | Performed by: NURSE PRACTITIONER

## 2021-06-28 PROCEDURE — 99213 OFFICE O/P EST LOW 20 MIN: CPT | Mod: S$GLB,,, | Performed by: NURSE PRACTITIONER

## 2021-06-28 PROCEDURE — 1126F PR PAIN SEVERITY QUANTIFIED, NO PAIN PRESENT: ICD-10-PCS | Mod: S$GLB,,, | Performed by: NURSE PRACTITIONER

## 2021-06-28 PROCEDURE — 99999 PR PBB SHADOW E&M-EST. PATIENT-LVL IV: CPT | Mod: PBBFAC,,, | Performed by: NURSE PRACTITIONER

## 2021-06-28 PROCEDURE — 99213 PR OFFICE/OUTPT VISIT, EST, LEVL III, 20-29 MIN: ICD-10-PCS | Mod: S$GLB,,, | Performed by: NURSE PRACTITIONER

## 2021-06-28 PROCEDURE — 99999 PR PBB SHADOW E&M-EST. PATIENT-LVL IV: ICD-10-PCS | Mod: PBBFAC,,, | Performed by: NURSE PRACTITIONER

## 2021-06-28 RX ORDER — SODIUM BICARBONATE 650 MG/1
650 TABLET ORAL 3 TIMES DAILY
Qty: 90 TABLET | Refills: 0 | Status: SHIPPED | OUTPATIENT
Start: 2021-06-28 | End: 2022-02-23 | Stop reason: ALTCHOICE

## 2021-06-29 ENCOUNTER — APPOINTMENT (OUTPATIENT)
Dept: LAB | Facility: HOSPITAL | Age: 56
End: 2021-06-29
Payer: COMMERCIAL

## 2021-06-29 DIAGNOSIS — Z01.818 PRE-OPERATIVE EXAMINATION: Primary | ICD-10-CM

## 2021-06-29 DIAGNOSIS — E11.22 TYPE 2 DIABETES MELLITUS WITH STAGE 5 CHRONIC KIDNEY DISEASE NOT ON CHRONIC DIALYSIS, WITH LONG-TERM CURRENT USE OF INSULIN: Primary | ICD-10-CM

## 2021-06-29 DIAGNOSIS — Z79.4 TYPE 2 DIABETES MELLITUS WITH STAGE 5 CHRONIC KIDNEY DISEASE NOT ON CHRONIC DIALYSIS, WITH LONG-TERM CURRENT USE OF INSULIN: Primary | ICD-10-CM

## 2021-06-29 DIAGNOSIS — N18.5 TYPE 2 DIABETES MELLITUS WITH STAGE 5 CHRONIC KIDNEY DISEASE NOT ON CHRONIC DIALYSIS, WITH LONG-TERM CURRENT USE OF INSULIN: Primary | ICD-10-CM

## 2021-06-30 ENCOUNTER — HOSPITAL ENCOUNTER (OUTPATIENT)
Dept: WOUND CARE | Facility: HOSPITAL | Age: 56
Discharge: HOME | End: 2021-06-30
Attending: PODIATRIST
Payer: COMMERCIAL

## 2021-06-30 ENCOUNTER — TELEPHONE (OUTPATIENT)
Dept: INFECTIOUS DISEASES | Facility: CLINIC | Age: 56
End: 2021-06-30

## 2021-06-30 VITALS
TEMPERATURE: 98 F | HEART RATE: 85 BPM | SYSTOLIC BLOOD PRESSURE: 180 MMHG | RESPIRATION RATE: 20 BRPM | DIASTOLIC BLOOD PRESSURE: 84 MMHG

## 2021-06-30 DIAGNOSIS — L97.526 DIABETIC ULCER OF LEFT FOOT ASSOCIATED WITH TYPE 2 DIABETES MELLITUS, WITH BONE INVOLVEMENT WITHOUT EVIDENCE OF NECROSIS: Primary | ICD-10-CM

## 2021-06-30 DIAGNOSIS — N18.4 CHRONIC KIDNEY DISEASE, STAGE IV (SEVERE): Primary | ICD-10-CM

## 2021-06-30 DIAGNOSIS — D50.9 IRON DEFICIENCY ANEMIA, UNSPECIFIED IRON DEFICIENCY ANEMIA TYPE: ICD-10-CM

## 2021-06-30 DIAGNOSIS — E11.621 DIABETIC ULCER OF LEFT FOOT ASSOCIATED WITH TYPE 2 DIABETES MELLITUS, WITH BONE INVOLVEMENT WITHOUT EVIDENCE OF NECROSIS: Primary | ICD-10-CM

## 2021-06-30 PROCEDURE — 29581 APPL MULTLAYER CMPRN SYS LEG: CPT

## 2021-07-01 PROBLEM — D50.9 IRON DEFICIENCY ANEMIA: Status: ACTIVE | Noted: 2021-07-01

## 2021-07-01 RX ORDER — SODIUM CHLORIDE 0.9 % (FLUSH) 0.9 %
10 SYRINGE (ML) INJECTION
Status: CANCELLED | OUTPATIENT
Start: 2021-07-05

## 2021-07-01 RX ORDER — HEPARIN 100 UNIT/ML
500 SYRINGE INTRAVENOUS
Status: CANCELLED | OUTPATIENT
Start: 2021-07-05

## 2021-07-02 ENCOUNTER — HOSPITAL ENCOUNTER (OUTPATIENT)
Dept: WOUND CARE | Facility: HOSPITAL | Age: 56
Discharge: HOME | End: 2021-07-02
Attending: PODIATRIST
Payer: COMMERCIAL

## 2021-07-02 VITALS — SYSTOLIC BLOOD PRESSURE: 140 MMHG | DIASTOLIC BLOOD PRESSURE: 82 MMHG | TEMPERATURE: 97 F | HEART RATE: 80 BPM

## 2021-07-02 DIAGNOSIS — M86.9 OSTEOMYELITIS OF LEFT FOOT: ICD-10-CM

## 2021-07-02 DIAGNOSIS — E11.621 DIABETIC ULCER OF LEFT MIDFOOT ASSOCIATED WITH TYPE 2 DIABETES MELLITUS, WITH BONE INVOLVEMENT WITHOUT EVIDENCE OF NECROSIS: Primary | ICD-10-CM

## 2021-07-02 DIAGNOSIS — L97.426 DIABETIC ULCER OF LEFT MIDFOOT ASSOCIATED WITH TYPE 2 DIABETES MELLITUS, WITH BONE INVOLVEMENT WITHOUT EVIDENCE OF NECROSIS: Primary | ICD-10-CM

## 2021-07-02 DIAGNOSIS — E11.49 TYPE II DIABETES MELLITUS WITH NEUROLOGICAL MANIFESTATIONS: ICD-10-CM

## 2021-07-02 PROCEDURE — 99499 UNLISTED E&M SERVICE: CPT | Mod: ,,, | Performed by: PODIATRIST

## 2021-07-02 PROCEDURE — 11045 DBRDMT SUBQ TISS EACH ADDL: CPT | Performed by: PODIATRIST

## 2021-07-02 PROCEDURE — 11042 DBRDMT SUBQ TIS 1ST 20SQCM/<: CPT | Mod: ,,, | Performed by: PODIATRIST

## 2021-07-02 PROCEDURE — 11042 DBRDMT SUBQ TIS 1ST 20SQCM/<: CPT | Performed by: PODIATRIST

## 2021-07-02 PROCEDURE — 11042 WOUND DEBRIDEMENT: ICD-10-PCS | Mod: ,,, | Performed by: PODIATRIST

## 2021-07-02 PROCEDURE — 99499 NO LOS: ICD-10-PCS | Mod: ,,, | Performed by: PODIATRIST

## 2021-07-06 ENCOUNTER — HOSPITAL ENCOUNTER (OUTPATIENT)
Dept: WOUND CARE | Facility: HOSPITAL | Age: 56
Discharge: HOME | End: 2021-07-06
Attending: FAMILY MEDICINE
Payer: COMMERCIAL

## 2021-07-06 VITALS
SYSTOLIC BLOOD PRESSURE: 147 MMHG | HEART RATE: 86 BPM | RESPIRATION RATE: 18 BRPM | TEMPERATURE: 98 F | DIASTOLIC BLOOD PRESSURE: 72 MMHG

## 2021-07-06 DIAGNOSIS — E11.621 DIABETIC ULCER OF LEFT FOOT ASSOCIATED WITH TYPE 2 DIABETES MELLITUS, WITH BONE INVOLVEMENT WITHOUT EVIDENCE OF NECROSIS: Primary | ICD-10-CM

## 2021-07-06 DIAGNOSIS — L97.526 DIABETIC ULCER OF LEFT FOOT ASSOCIATED WITH TYPE 2 DIABETES MELLITUS, WITH BONE INVOLVEMENT WITHOUT EVIDENCE OF NECROSIS: Primary | ICD-10-CM

## 2021-07-06 PROCEDURE — 29581 APPL MULTLAYER CMPRN SYS LEG: CPT

## 2021-07-09 ENCOUNTER — HOSPITAL ENCOUNTER (OUTPATIENT)
Dept: WOUND CARE | Facility: HOSPITAL | Age: 56
Discharge: HOME | End: 2021-07-09
Attending: PODIATRIST
Payer: COMMERCIAL

## 2021-07-09 ENCOUNTER — PATIENT OUTREACH (OUTPATIENT)
Dept: ADMINISTRATIVE | Facility: HOSPITAL | Age: 56
End: 2021-07-09

## 2021-07-09 ENCOUNTER — PATIENT MESSAGE (OUTPATIENT)
Dept: ADMINISTRATIVE | Facility: HOSPITAL | Age: 56
End: 2021-07-09

## 2021-07-09 VITALS
HEART RATE: 77 BPM | TEMPERATURE: 98 F | DIASTOLIC BLOOD PRESSURE: 61 MMHG | RESPIRATION RATE: 20 BRPM | SYSTOLIC BLOOD PRESSURE: 121 MMHG

## 2021-07-09 DIAGNOSIS — L97.526 DIABETIC ULCER OF LEFT FOOT ASSOCIATED WITH TYPE 2 DIABETES MELLITUS, WITH BONE INVOLVEMENT WITHOUT EVIDENCE OF NECROSIS: Primary | ICD-10-CM

## 2021-07-09 DIAGNOSIS — E11.621 DIABETIC ULCER OF LEFT FOOT ASSOCIATED WITH TYPE 2 DIABETES MELLITUS, WITH BONE INVOLVEMENT WITHOUT EVIDENCE OF NECROSIS: Primary | ICD-10-CM

## 2021-07-09 PROCEDURE — 29581 APPL MULTLAYER CMPRN SYS LEG: CPT | Performed by: PODIATRIST

## 2021-07-09 PROCEDURE — 99214 PR OFFICE/OUTPT VISIT, EST, LEVL IV, 30-39 MIN: ICD-10-PCS | Mod: ,,, | Performed by: PODIATRIST

## 2021-07-09 PROCEDURE — 99214 OFFICE O/P EST MOD 30 MIN: CPT | Mod: ,,, | Performed by: PODIATRIST

## 2021-07-10 ENCOUNTER — TELEPHONE (OUTPATIENT)
Dept: INFECTIOUS DISEASES | Facility: CLINIC | Age: 56
End: 2021-07-10

## 2021-07-13 ENCOUNTER — HOSPITAL ENCOUNTER (OUTPATIENT)
Dept: WOUND CARE | Facility: HOSPITAL | Age: 56
Discharge: HOME | End: 2021-07-13
Attending: PODIATRIST
Payer: COMMERCIAL

## 2021-07-13 VITALS
HEART RATE: 91 BPM | RESPIRATION RATE: 20 BRPM | SYSTOLIC BLOOD PRESSURE: 134 MMHG | DIASTOLIC BLOOD PRESSURE: 60 MMHG | TEMPERATURE: 98 F

## 2021-07-13 DIAGNOSIS — E11.621 DIABETIC ULCER OF LEFT FOOT ASSOCIATED WITH TYPE 2 DIABETES MELLITUS, WITH BONE INVOLVEMENT WITHOUT EVIDENCE OF NECROSIS: Primary | ICD-10-CM

## 2021-07-13 DIAGNOSIS — L97.526 DIABETIC ULCER OF LEFT FOOT ASSOCIATED WITH TYPE 2 DIABETES MELLITUS, WITH BONE INVOLVEMENT WITHOUT EVIDENCE OF NECROSIS: Primary | ICD-10-CM

## 2021-07-13 PROCEDURE — 29581 APPL MULTLAYER CMPRN SYS LEG: CPT | Performed by: FAMILY MEDICINE

## 2021-07-16 ENCOUNTER — HOSPITAL ENCOUNTER (OUTPATIENT)
Dept: WOUND CARE | Facility: HOSPITAL | Age: 56
Discharge: HOME | End: 2021-07-16
Attending: PODIATRIST
Payer: COMMERCIAL

## 2021-07-16 VITALS — TEMPERATURE: 98 F | SYSTOLIC BLOOD PRESSURE: 184 MMHG | HEART RATE: 86 BPM | DIASTOLIC BLOOD PRESSURE: 86 MMHG

## 2021-07-16 DIAGNOSIS — E11.621 DIABETIC ULCER OF LEFT FOOT ASSOCIATED WITH TYPE 2 DIABETES MELLITUS, WITH BONE INVOLVEMENT WITHOUT EVIDENCE OF NECROSIS: Primary | ICD-10-CM

## 2021-07-16 DIAGNOSIS — E11.49 TYPE II DIABETES MELLITUS WITH NEUROLOGICAL MANIFESTATIONS: ICD-10-CM

## 2021-07-16 DIAGNOSIS — L97.526 DIABETIC ULCER OF LEFT FOOT ASSOCIATED WITH TYPE 2 DIABETES MELLITUS, WITH BONE INVOLVEMENT WITHOUT EVIDENCE OF NECROSIS: Primary | ICD-10-CM

## 2021-07-16 PROCEDURE — 99214 PR OFFICE/OUTPT VISIT, EST, LEVL IV, 30-39 MIN: ICD-10-PCS | Mod: ,,, | Performed by: PODIATRIST

## 2021-07-16 PROCEDURE — 29581 APPL MULTLAYER CMPRN SYS LEG: CPT | Performed by: PODIATRIST

## 2021-07-16 PROCEDURE — 99214 OFFICE O/P EST MOD 30 MIN: CPT | Mod: ,,, | Performed by: PODIATRIST

## 2021-07-20 ENCOUNTER — HOSPITAL ENCOUNTER (OUTPATIENT)
Dept: WOUND CARE | Facility: HOSPITAL | Age: 56
Discharge: HOME | End: 2021-07-20
Attending: PODIATRIST
Payer: COMMERCIAL

## 2021-07-20 ENCOUNTER — TELEPHONE (OUTPATIENT)
Dept: INFECTIOUS DISEASES | Facility: CLINIC | Age: 56
End: 2021-07-20

## 2021-07-20 VITALS
TEMPERATURE: 98 F | RESPIRATION RATE: 20 BRPM | SYSTOLIC BLOOD PRESSURE: 147 MMHG | HEART RATE: 87 BPM | DIASTOLIC BLOOD PRESSURE: 71 MMHG

## 2021-07-20 DIAGNOSIS — E11.621 DIABETIC ULCER OF LEFT FOOT ASSOCIATED WITH TYPE 2 DIABETES MELLITUS, WITH BONE INVOLVEMENT WITHOUT EVIDENCE OF NECROSIS: Primary | ICD-10-CM

## 2021-07-20 DIAGNOSIS — L97.526 DIABETIC ULCER OF LEFT FOOT ASSOCIATED WITH TYPE 2 DIABETES MELLITUS, WITH BONE INVOLVEMENT WITHOUT EVIDENCE OF NECROSIS: Primary | ICD-10-CM

## 2021-07-20 PROCEDURE — 29581 APPL MULTLAYER CMPRN SYS LEG: CPT

## 2021-07-21 ENCOUNTER — HOSPITAL ENCOUNTER (OUTPATIENT)
Dept: VASCULAR SURGERY | Facility: CLINIC | Age: 56
Discharge: HOME | End: 2021-07-21
Attending: SURGERY
Payer: COMMERCIAL

## 2021-07-21 ENCOUNTER — INITIAL CONSULT (OUTPATIENT)
Dept: VASCULAR SURGERY | Facility: CLINIC | Age: 56
End: 2021-07-21
Attending: SURGERY
Payer: COMMERCIAL

## 2021-07-21 VITALS
WEIGHT: 277.75 LBS | HEART RATE: 84 BPM | HEIGHT: 73 IN | SYSTOLIC BLOOD PRESSURE: 136 MMHG | BODY MASS INDEX: 36.81 KG/M2 | DIASTOLIC BLOOD PRESSURE: 71 MMHG | TEMPERATURE: 98 F

## 2021-07-21 DIAGNOSIS — N18.5 TYPE 2 DIABETES MELLITUS WITH STAGE 5 CHRONIC KIDNEY DISEASE NOT ON CHRONIC DIALYSIS, WITH LONG-TERM CURRENT USE OF INSULIN: ICD-10-CM

## 2021-07-21 DIAGNOSIS — Z99.2 ESRD ON HEMODIALYSIS: Primary | ICD-10-CM

## 2021-07-21 DIAGNOSIS — Z01.818 PRE-OP EVALUATION: Primary | ICD-10-CM

## 2021-07-21 DIAGNOSIS — N18.6 ESRD ON HEMODIALYSIS: Primary | ICD-10-CM

## 2021-07-21 DIAGNOSIS — N18.6 ESRD (END STAGE RENAL DISEASE): ICD-10-CM

## 2021-07-21 DIAGNOSIS — Z79.4 TYPE 2 DIABETES MELLITUS WITH STAGE 5 CHRONIC KIDNEY DISEASE NOT ON CHRONIC DIALYSIS, WITH LONG-TERM CURRENT USE OF INSULIN: ICD-10-CM

## 2021-07-21 DIAGNOSIS — E11.22 TYPE 2 DIABETES MELLITUS WITH STAGE 5 CHRONIC KIDNEY DISEASE NOT ON CHRONIC DIALYSIS, WITH LONG-TERM CURRENT USE OF INSULIN: ICD-10-CM

## 2021-07-21 DIAGNOSIS — Z01.818 PRE-OPERATIVE EXAMINATION: ICD-10-CM

## 2021-07-21 PROCEDURE — 99999 PR PBB SHADOW E&M-EST. PATIENT-LVL IV: CPT | Mod: PBBFAC,,, | Performed by: SURGERY

## 2021-07-21 PROCEDURE — 93970 PR US DUPLEX, UPPER OR LOWER EXT VENOUS,COMPLETE BILAT: ICD-10-PCS | Mod: S$GLB,,, | Performed by: SURGERY

## 2021-07-21 PROCEDURE — 99204 PR OFFICE/OUTPT VISIT, NEW, LEVL IV, 45-59 MIN: ICD-10-PCS | Mod: S$GLB,,, | Performed by: SURGERY

## 2021-07-21 PROCEDURE — 99999 PR PBB SHADOW E&M-EST. PATIENT-LVL IV: ICD-10-PCS | Mod: PBBFAC,,, | Performed by: SURGERY

## 2021-07-21 PROCEDURE — 99204 OFFICE O/P NEW MOD 45 MIN: CPT | Mod: S$GLB,,, | Performed by: SURGERY

## 2021-07-21 PROCEDURE — 93970 EXTREMITY STUDY: CPT | Mod: S$GLB,,, | Performed by: SURGERY

## 2021-07-22 ENCOUNTER — TELEPHONE (OUTPATIENT)
Dept: INFECTIOUS DISEASES | Facility: HOSPITAL | Age: 56
End: 2021-07-22

## 2021-07-23 ENCOUNTER — HOSPITAL ENCOUNTER (OUTPATIENT)
Dept: WOUND CARE | Facility: HOSPITAL | Age: 56
Discharge: HOME | End: 2021-07-23
Attending: PODIATRIST
Payer: COMMERCIAL

## 2021-07-23 VITALS — DIASTOLIC BLOOD PRESSURE: 84 MMHG | SYSTOLIC BLOOD PRESSURE: 175 MMHG | TEMPERATURE: 98 F | HEART RATE: 84 BPM

## 2021-07-23 DIAGNOSIS — Z79.4 TYPE 2 DIABETES MELLITUS WITH STAGE 5 CHRONIC KIDNEY DISEASE NOT ON CHRONIC DIALYSIS, WITH LONG-TERM CURRENT USE OF INSULIN: Primary | ICD-10-CM

## 2021-07-23 DIAGNOSIS — N18.5 TYPE 2 DIABETES MELLITUS WITH STAGE 5 CHRONIC KIDNEY DISEASE NOT ON CHRONIC DIALYSIS, WITH LONG-TERM CURRENT USE OF INSULIN: Primary | ICD-10-CM

## 2021-07-23 DIAGNOSIS — L97.426 DIABETIC ULCER OF LEFT MIDFOOT ASSOCIATED WITH TYPE 2 DIABETES MELLITUS, WITH BONE INVOLVEMENT WITHOUT EVIDENCE OF NECROSIS: ICD-10-CM

## 2021-07-23 DIAGNOSIS — E11.22 TYPE 2 DIABETES MELLITUS WITH STAGE 5 CHRONIC KIDNEY DISEASE NOT ON CHRONIC DIALYSIS, WITH LONG-TERM CURRENT USE OF INSULIN: Primary | ICD-10-CM

## 2021-07-23 DIAGNOSIS — M86.9 OSTEOMYELITIS OF LEFT FOOT: ICD-10-CM

## 2021-07-23 DIAGNOSIS — L97.526 DIABETIC ULCER OF LEFT FOOT ASSOCIATED WITH TYPE 2 DIABETES MELLITUS, WITH BONE INVOLVEMENT WITHOUT EVIDENCE OF NECROSIS: ICD-10-CM

## 2021-07-23 DIAGNOSIS — E11.621 DIABETIC ULCER OF LEFT FOOT ASSOCIATED WITH TYPE 2 DIABETES MELLITUS, WITH BONE INVOLVEMENT WITHOUT EVIDENCE OF NECROSIS: ICD-10-CM

## 2021-07-23 DIAGNOSIS — E11.621 DIABETIC ULCER OF LEFT MIDFOOT ASSOCIATED WITH TYPE 2 DIABETES MELLITUS, WITH BONE INVOLVEMENT WITHOUT EVIDENCE OF NECROSIS: ICD-10-CM

## 2021-07-23 PROCEDURE — 15275 SKIN SUB GRAFT FACE/NK/HF/G: CPT | Performed by: PODIATRIST

## 2021-07-23 PROCEDURE — 15275 PR SKIN SUB GRAFT FACE/NK/HF/G UP TO 100 SQCM: ICD-10-PCS | Mod: ,,, | Performed by: PODIATRIST

## 2021-07-23 PROCEDURE — 99499 UNLISTED E&M SERVICE: CPT | Mod: ,,, | Performed by: PODIATRIST

## 2021-07-23 PROCEDURE — 99499 NO LOS: ICD-10-PCS | Mod: ,,, | Performed by: PODIATRIST

## 2021-07-23 PROCEDURE — 15275 SKIN SUB GRAFT FACE/NK/HF/G: CPT | Mod: ,,, | Performed by: PODIATRIST

## 2021-07-24 ENCOUNTER — TELEPHONE (OUTPATIENT)
Dept: INFECTIOUS DISEASES | Facility: HOSPITAL | Age: 56
End: 2021-07-24

## 2021-07-24 ENCOUNTER — TELEPHONE (OUTPATIENT)
Dept: NEPHROLOGY | Facility: HOSPITAL | Age: 56
End: 2021-07-24

## 2021-07-26 DIAGNOSIS — R89.9 ABNORMAL LABORATORY TEST: Primary | ICD-10-CM

## 2021-07-27 ENCOUNTER — HOSPITAL ENCOUNTER (INPATIENT)
Facility: HOSPITAL | Age: 56
LOS: 6 days | Discharge: HOME | DRG: 673 | End: 2021-08-02
Attending: EMERGENCY MEDICINE | Admitting: EMERGENCY MEDICINE
Payer: COMMERCIAL

## 2021-07-27 ENCOUNTER — TELEPHONE (OUTPATIENT)
Dept: INFECTIOUS DISEASES | Facility: CLINIC | Age: 56
End: 2021-07-27

## 2021-07-27 DIAGNOSIS — M86.9 OSTEOMYELITIS, UNSPECIFIED SITE, UNSPECIFIED TYPE: ICD-10-CM

## 2021-07-27 DIAGNOSIS — E87.70 VOLUME OVERLOAD: ICD-10-CM

## 2021-07-27 DIAGNOSIS — E11.22 TYPE 2 DIABETES MELLITUS WITH STAGE 4 CHRONIC KIDNEY DISEASE, WITH LONG-TERM CURRENT USE OF INSULIN: ICD-10-CM

## 2021-07-27 DIAGNOSIS — I89.0 LYMPHEDEMA OF BOTH LOWER EXTREMITIES: ICD-10-CM

## 2021-07-27 DIAGNOSIS — E87.1 HYPONATREMIA: ICD-10-CM

## 2021-07-27 DIAGNOSIS — N17.9 ACUTE KIDNEY INJURY SUPERIMPOSED ON CHRONIC KIDNEY DISEASE: ICD-10-CM

## 2021-07-27 DIAGNOSIS — N18.9 ACUTE KIDNEY INJURY SUPERIMPOSED ON CHRONIC KIDNEY DISEASE: ICD-10-CM

## 2021-07-27 DIAGNOSIS — J81.0 ACUTE PULMONARY EDEMA: Primary | ICD-10-CM

## 2021-07-27 DIAGNOSIS — N19 RENAL FAILURE, UNSPECIFIED CHRONICITY: ICD-10-CM

## 2021-07-27 DIAGNOSIS — J96.01 ACUTE HYPOXEMIC RESPIRATORY FAILURE: ICD-10-CM

## 2021-07-27 DIAGNOSIS — L97.529 DIABETIC ULCER OF TOE OF LEFT FOOT ASSOCIATED WITH TYPE 2 DIABETES MELLITUS, UNSPECIFIED ULCER STAGE: ICD-10-CM

## 2021-07-27 DIAGNOSIS — E87.20 METABOLIC ACIDOSIS: ICD-10-CM

## 2021-07-27 DIAGNOSIS — Z99.2 ANEMIA DUE TO CHRONIC KIDNEY DISEASE, ON CHRONIC DIALYSIS: ICD-10-CM

## 2021-07-27 DIAGNOSIS — Z79.4 TYPE 2 DIABETES MELLITUS WITH STAGE 5 CHRONIC KIDNEY DISEASE NOT ON CHRONIC DIALYSIS, WITH LONG-TERM CURRENT USE OF INSULIN: ICD-10-CM

## 2021-07-27 DIAGNOSIS — N18.5 TYPE 2 DIABETES MELLITUS WITH STAGE 5 CHRONIC KIDNEY DISEASE NOT ON CHRONIC DIALYSIS, WITH LONG-TERM CURRENT USE OF INSULIN: ICD-10-CM

## 2021-07-27 DIAGNOSIS — E87.70 HYPERVOLEMIA, UNSPECIFIED HYPERVOLEMIA TYPE: ICD-10-CM

## 2021-07-27 DIAGNOSIS — Z99.2 DIALYSIS PATIENT: ICD-10-CM

## 2021-07-27 DIAGNOSIS — N18.6 ANEMIA DUE TO CHRONIC KIDNEY DISEASE, ON CHRONIC DIALYSIS: ICD-10-CM

## 2021-07-27 DIAGNOSIS — R09.02 HYPOXIA: ICD-10-CM

## 2021-07-27 DIAGNOSIS — M86.9 OSTEOMYELITIS OF LEFT FOOT, UNSPECIFIED TYPE: ICD-10-CM

## 2021-07-27 DIAGNOSIS — D63.1 ANEMIA DUE TO CHRONIC KIDNEY DISEASE, ON CHRONIC DIALYSIS: ICD-10-CM

## 2021-07-27 DIAGNOSIS — E11.621 DIABETIC ULCER OF TOE OF LEFT FOOT ASSOCIATED WITH TYPE 2 DIABETES MELLITUS, UNSPECIFIED ULCER STAGE: ICD-10-CM

## 2021-07-27 DIAGNOSIS — E11.22 TYPE 2 DIABETES MELLITUS WITH STAGE 5 CHRONIC KIDNEY DISEASE NOT ON CHRONIC DIALYSIS, WITH LONG-TERM CURRENT USE OF INSULIN: ICD-10-CM

## 2021-07-27 DIAGNOSIS — Z79.4 TYPE 2 DIABETES MELLITUS WITH STAGE 4 CHRONIC KIDNEY DISEASE, WITH LONG-TERM CURRENT USE OF INSULIN: ICD-10-CM

## 2021-07-27 DIAGNOSIS — N18.6 ESRD (END STAGE RENAL DISEASE): ICD-10-CM

## 2021-07-27 DIAGNOSIS — N18.4 TYPE 2 DIABETES MELLITUS WITH STAGE 4 CHRONIC KIDNEY DISEASE, WITH LONG-TERM CURRENT USE OF INSULIN: ICD-10-CM

## 2021-07-27 DIAGNOSIS — I10 ESSENTIAL HYPERTENSION: Chronic | ICD-10-CM

## 2021-07-27 LAB
ALBUMIN SERPL BCP-MCNC: 2.3 G/DL (ref 3.5–5.2)
ALP SERPL-CCNC: 375 U/L (ref 55–135)
ALT SERPL W/O P-5'-P-CCNC: 30 U/L (ref 10–44)
ANION GAP SERPL CALC-SCNC: 14 MMOL/L (ref 8–16)
AST SERPL-CCNC: 31 U/L (ref 10–40)
BACTERIA #/AREA URNS AUTO: NORMAL /HPF
BASOPHILS # BLD AUTO: 0.02 K/UL (ref 0–0.2)
BASOPHILS NFR BLD: 0.2 % (ref 0–1.9)
BILIRUB SERPL-MCNC: 0.5 MG/DL (ref 0.1–1)
BILIRUB UR QL STRIP: NEGATIVE
BNP SERPL-MCNC: 548 PG/ML (ref 0–99)
BNP SERPL-MCNC: 582 PG/ML (ref 0–99)
BUN SERPL-MCNC: 104 MG/DL (ref 6–20)
CALCIUM SERPL-MCNC: 8.6 MG/DL (ref 8.7–10.5)
CHLORIDE SERPL-SCNC: 97 MMOL/L (ref 95–110)
CLARITY UR REFRACT.AUTO: ABNORMAL
CO2 SERPL-SCNC: 17 MMOL/L (ref 23–29)
COLOR UR AUTO: YELLOW
CREAT SERPL-MCNC: 8.5 MG/DL (ref 0.5–1.4)
CTP QC/QA: YES
DIFFERENTIAL METHOD: ABNORMAL
EOSINOPHIL # BLD AUTO: 0 K/UL (ref 0–0.5)
EOSINOPHIL NFR BLD: 0.1 % (ref 0–8)
EOSINOPHIL URNS QL WRIGHT STN: NORMAL
ERYTHROCYTE [DISTWIDTH] IN BLOOD BY AUTOMATED COUNT: 14.2 % (ref 11.5–14.5)
EST. GFR  (AFRICAN AMERICAN): 7.2 ML/MIN/1.73 M^2
EST. GFR  (NON AFRICAN AMERICAN): 6.3 ML/MIN/1.73 M^2
GLUCOSE SERPL-MCNC: 129 MG/DL (ref 70–110)
GLUCOSE UR QL STRIP: ABNORMAL
HCT VFR BLD AUTO: 25.6 % (ref 40–54)
HGB BLD-MCNC: 8.2 G/DL (ref 14–18)
HGB UR QL STRIP: NEGATIVE
HIV 1+2 AB+HIV1 P24 AG SERPL QL IA: NEGATIVE
HYALINE CASTS UR QL AUTO: 0 /LPF
IMM GRANULOCYTES # BLD AUTO: 0.06 K/UL (ref 0–0.04)
IMM GRANULOCYTES NFR BLD AUTO: 0.6 % (ref 0–0.5)
KETONES UR QL STRIP: NEGATIVE
LEUKOCYTE ESTERASE UR QL STRIP: NEGATIVE
LYMPHOCYTES # BLD AUTO: 1 K/UL (ref 1–4.8)
LYMPHOCYTES NFR BLD: 9.8 % (ref 18–48)
MCH RBC QN AUTO: 27.1 PG (ref 27–31)
MCHC RBC AUTO-ENTMCNC: 32 G/DL (ref 32–36)
MCV RBC AUTO: 85 FL (ref 82–98)
MICROSCOPIC COMMENT: NORMAL
MONOCYTES # BLD AUTO: 1.2 K/UL (ref 0.3–1)
MONOCYTES NFR BLD: 11.9 % (ref 4–15)
NEUTROPHILS # BLD AUTO: 7.9 K/UL (ref 1.8–7.7)
NEUTROPHILS NFR BLD: 77.4 % (ref 38–73)
NITRITE UR QL STRIP: NEGATIVE
NRBC BLD-RTO: 0 /100 WBC
PH UR STRIP: 5 [PH] (ref 5–8)
PLATELET # BLD AUTO: 168 K/UL (ref 150–450)
PMV BLD AUTO: 10.2 FL (ref 9.2–12.9)
POCT GLUCOSE: 155 MG/DL (ref 70–110)
POTASSIUM SERPL-SCNC: 5.1 MMOL/L (ref 3.5–5.1)
PROT SERPL-MCNC: 6.9 G/DL (ref 6–8.4)
PROT UR QL STRIP: ABNORMAL
RBC # BLD AUTO: 3.03 M/UL (ref 4.6–6.2)
RBC #/AREA URNS AUTO: 1 /HPF (ref 0–4)
SARS-COV-2 RDRP RESP QL NAA+PROBE: NEGATIVE
SODIUM SERPL-SCNC: 128 MMOL/L (ref 136–145)
SP GR UR STRIP: 1.01 (ref 1–1.03)
TROPONIN I SERPL DL<=0.01 NG/ML-MCNC: 0.09 NG/ML (ref 0–0.03)
TROPONIN I SERPL DL<=0.01 NG/ML-MCNC: 0.12 NG/ML (ref 0–0.03)
URN SPEC COLLECT METH UR: ABNORMAL
WBC # BLD AUTO: 10.21 K/UL (ref 3.9–12.7)
WBC #/AREA URNS AUTO: 3 /HPF (ref 0–5)

## 2021-07-27 PROCEDURE — 84484 ASSAY OF TROPONIN QUANT: CPT | Mod: 91 | Performed by: EMERGENCY MEDICINE

## 2021-07-27 PROCEDURE — 87389 HIV-1 AG W/HIV-1&-2 AB AG IA: CPT | Performed by: EMERGENCY MEDICINE

## 2021-07-27 PROCEDURE — 25000003 PHARM REV CODE 250: Performed by: INTERNAL MEDICINE

## 2021-07-27 PROCEDURE — 87205 SMEAR GRAM STAIN: CPT | Performed by: INTERNAL MEDICINE

## 2021-07-27 PROCEDURE — 93010 ELECTROCARDIOGRAM REPORT: CPT | Mod: ,,, | Performed by: INTERNAL MEDICINE

## 2021-07-27 PROCEDURE — 99285 EMERGENCY DEPT VISIT HI MDM: CPT | Mod: CR,CS,, | Performed by: PHYSICIAN ASSISTANT

## 2021-07-27 PROCEDURE — 80053 COMPREHEN METABOLIC PANEL: CPT | Performed by: PHYSICIAN ASSISTANT

## 2021-07-27 PROCEDURE — 83880 ASSAY OF NATRIURETIC PEPTIDE: CPT | Performed by: PHYSICIAN ASSISTANT

## 2021-07-27 PROCEDURE — 93010 EKG 12-LEAD: ICD-10-PCS | Mod: ,,, | Performed by: INTERNAL MEDICINE

## 2021-07-27 PROCEDURE — 82570 ASSAY OF URINE CREATININE: CPT | Performed by: INTERNAL MEDICINE

## 2021-07-27 PROCEDURE — 63600175 PHARM REV CODE 636 W HCPCS: Performed by: INTERNAL MEDICINE

## 2021-07-27 PROCEDURE — 99285 PR EMERGENCY DEPT VISIT,LEVEL V: ICD-10-PCS | Mod: CR,CS,, | Performed by: PHYSICIAN ASSISTANT

## 2021-07-27 PROCEDURE — 85025 COMPLETE CBC W/AUTO DIFF WBC: CPT | Performed by: PHYSICIAN ASSISTANT

## 2021-07-27 PROCEDURE — U0002 COVID-19 LAB TEST NON-CDC: HCPCS | Performed by: EMERGENCY MEDICINE

## 2021-07-27 PROCEDURE — 81001 URINALYSIS AUTO W/SCOPE: CPT | Performed by: PHYSICIAN ASSISTANT

## 2021-07-27 PROCEDURE — 99223 1ST HOSP IP/OBS HIGH 75: CPT | Mod: ,,, | Performed by: INTERNAL MEDICINE

## 2021-07-27 PROCEDURE — 96374 THER/PROPH/DIAG INJ IV PUSH: CPT

## 2021-07-27 PROCEDURE — 93005 ELECTROCARDIOGRAM TRACING: CPT

## 2021-07-27 PROCEDURE — 99285 EMERGENCY DEPT VISIT HI MDM: CPT

## 2021-07-27 PROCEDURE — 84300 ASSAY OF URINE SODIUM: CPT | Performed by: INTERNAL MEDICINE

## 2021-07-27 PROCEDURE — 84484 ASSAY OF TROPONIN QUANT: CPT | Performed by: PHYSICIAN ASSISTANT

## 2021-07-27 PROCEDURE — 83880 ASSAY OF NATRIURETIC PEPTIDE: CPT | Mod: 91 | Performed by: EMERGENCY MEDICINE

## 2021-07-27 PROCEDURE — 11000001 HC ACUTE MED/SURG PRIVATE ROOM

## 2021-07-27 PROCEDURE — 63600175 PHARM REV CODE 636 W HCPCS: Performed by: PHYSICIAN ASSISTANT

## 2021-07-27 PROCEDURE — 99223 PR INITIAL HOSPITAL CARE,LEVL III: ICD-10-PCS | Mod: ,,, | Performed by: INTERNAL MEDICINE

## 2021-07-27 RX ORDER — GLUCAGON 1 MG
1 KIT INJECTION
Status: DISCONTINUED | OUTPATIENT
Start: 2021-07-27 | End: 2021-08-02 | Stop reason: HOSPADM

## 2021-07-27 RX ORDER — CARVEDILOL 25 MG/1
25 TABLET ORAL 2 TIMES DAILY
Status: DISCONTINUED | OUTPATIENT
Start: 2021-07-27 | End: 2021-08-02 | Stop reason: HOSPADM

## 2021-07-27 RX ORDER — FUROSEMIDE 40 MG/1
40 TABLET ORAL DAILY
Status: DISCONTINUED | OUTPATIENT
Start: 2021-07-28 | End: 2021-08-02 | Stop reason: HOSPADM

## 2021-07-27 RX ORDER — FUROSEMIDE 10 MG/ML
120 INJECTION INTRAMUSCULAR; INTRAVENOUS
Status: DISCONTINUED | OUTPATIENT
Start: 2021-07-27 | End: 2021-07-27

## 2021-07-27 RX ORDER — ONDANSETRON 2 MG/ML
4 INJECTION INTRAMUSCULAR; INTRAVENOUS EVERY 8 HOURS PRN
Status: DISCONTINUED | OUTPATIENT
Start: 2021-07-27 | End: 2021-08-02 | Stop reason: HOSPADM

## 2021-07-27 RX ORDER — TALC
6 POWDER (GRAM) TOPICAL NIGHTLY PRN
Status: DISCONTINUED | OUTPATIENT
Start: 2021-07-27 | End: 2021-08-02 | Stop reason: HOSPADM

## 2021-07-27 RX ORDER — MUPIROCIN 20 MG/G
OINTMENT TOPICAL 2 TIMES DAILY
Status: DISPENSED | OUTPATIENT
Start: 2021-07-28 | End: 2021-08-02

## 2021-07-27 RX ORDER — SODIUM CHLORIDE 0.9 % (FLUSH) 0.9 %
10 SYRINGE (ML) INJECTION
Status: DISCONTINUED | OUTPATIENT
Start: 2021-07-27 | End: 2021-08-02 | Stop reason: HOSPADM

## 2021-07-27 RX ORDER — ACETAMINOPHEN 325 MG/1
650 TABLET ORAL EVERY 6 HOURS PRN
Status: DISCONTINUED | OUTPATIENT
Start: 2021-07-27 | End: 2021-08-02 | Stop reason: HOSPADM

## 2021-07-27 RX ORDER — SODIUM BICARBONATE 650 MG/1
650 TABLET ORAL 3 TIMES DAILY
Status: DISCONTINUED | OUTPATIENT
Start: 2021-07-27 | End: 2021-07-30

## 2021-07-27 RX ORDER — HYDRALAZINE HYDROCHLORIDE 50 MG/1
100 TABLET, FILM COATED ORAL 3 TIMES DAILY
Status: DISCONTINUED | OUTPATIENT
Start: 2021-07-27 | End: 2021-08-02 | Stop reason: HOSPADM

## 2021-07-27 RX ORDER — SODIUM CHLORIDE 0.9 % (FLUSH) 0.9 %
5 SYRINGE (ML) INJECTION
Status: DISCONTINUED | OUTPATIENT
Start: 2021-07-27 | End: 2021-08-02 | Stop reason: HOSPADM

## 2021-07-27 RX ORDER — INSULIN ASPART 100 [IU]/ML
0-5 INJECTION, SOLUTION INTRAVENOUS; SUBCUTANEOUS
Status: DISCONTINUED | OUTPATIENT
Start: 2021-07-27 | End: 2021-08-02 | Stop reason: HOSPADM

## 2021-07-27 RX ORDER — FUROSEMIDE 10 MG/ML
80 INJECTION INTRAMUSCULAR; INTRAVENOUS
Status: COMPLETED | OUTPATIENT
Start: 2021-07-27 | End: 2021-07-27

## 2021-07-27 RX ORDER — IBUPROFEN 200 MG
24 TABLET ORAL
Status: DISCONTINUED | OUTPATIENT
Start: 2021-07-27 | End: 2021-08-02 | Stop reason: HOSPADM

## 2021-07-27 RX ORDER — ATORVASTATIN CALCIUM 20 MG/1
40 TABLET, FILM COATED ORAL DAILY
Status: DISCONTINUED | OUTPATIENT
Start: 2021-07-28 | End: 2021-08-02 | Stop reason: HOSPADM

## 2021-07-27 RX ORDER — HEPARIN SODIUM 5000 [USP'U]/ML
5000 INJECTION, SOLUTION INTRAVENOUS; SUBCUTANEOUS EVERY 8 HOURS
Status: DISCONTINUED | OUTPATIENT
Start: 2021-07-27 | End: 2021-08-02 | Stop reason: HOSPADM

## 2021-07-27 RX ORDER — BENZONATATE 100 MG/1
100 CAPSULE ORAL 3 TIMES DAILY PRN
Status: DISCONTINUED | OUTPATIENT
Start: 2021-07-28 | End: 2021-08-02 | Stop reason: HOSPADM

## 2021-07-27 RX ORDER — IBUPROFEN 200 MG
16 TABLET ORAL
Status: DISCONTINUED | OUTPATIENT
Start: 2021-07-27 | End: 2021-08-02 | Stop reason: HOSPADM

## 2021-07-27 RX ADMIN — BENZONATATE 100 MG: 100 CAPSULE, LIQUID FILLED ORAL at 11:07

## 2021-07-27 RX ADMIN — CARVEDILOL 25 MG: 25 TABLET, FILM COATED ORAL at 09:07

## 2021-07-27 RX ADMIN — FUROSEMIDE 80 MG: 10 INJECTION, SOLUTION INTRAMUSCULAR; INTRAVENOUS at 04:07

## 2021-07-27 RX ADMIN — HEPARIN SODIUM 5000 UNITS: 5000 INJECTION INTRAVENOUS; SUBCUTANEOUS at 09:07

## 2021-07-27 RX ADMIN — HYDRALAZINE HYDROCHLORIDE 100 MG: 50 TABLET ORAL at 09:07

## 2021-07-27 RX ADMIN — SODIUM BICARBONATE 650 MG TABLET 650 MG: at 09:07

## 2021-07-28 LAB
ALBUMIN SERPL BCP-MCNC: 2.3 G/DL (ref 3.5–5.2)
ALP SERPL-CCNC: 358 U/L (ref 55–135)
ALT SERPL W/O P-5'-P-CCNC: 26 U/L (ref 10–44)
ANION GAP SERPL CALC-SCNC: 17 MMOL/L (ref 8–16)
ASCENDING AORTA: 2.51 CM
AST SERPL-CCNC: 27 U/L (ref 10–40)
AV INDEX (PROSTH): 0.79
AV MEAN GRADIENT: 5 MMHG
AV PEAK GRADIENT: 6 MMHG
AV VALVE AREA: 1.9 CM2
AV VELOCITY RATIO: 0.72
BASOPHILS # BLD AUTO: 0.01 K/UL (ref 0–0.2)
BASOPHILS NFR BLD: 0.1 % (ref 0–1.9)
BILIRUB SERPL-MCNC: 0.5 MG/DL (ref 0.1–1)
BSA FOR ECHO PROCEDURE: 2.56 M2
BUN SERPL-MCNC: 114 MG/DL (ref 6–20)
CALCIUM SERPL-MCNC: 8.3 MG/DL (ref 8.7–10.5)
CHLORIDE SERPL-SCNC: 97 MMOL/L (ref 95–110)
CO2 SERPL-SCNC: 16 MMOL/L (ref 23–29)
CREAT SERPL-MCNC: 9 MG/DL (ref 0.5–1.4)
CREAT UR-MCNC: 97 MG/DL (ref 23–375)
CREAT UR-MCNC: 97 MG/DL (ref 23–375)
CV ECHO LV RWT: 0.3 CM
DIFFERENTIAL METHOD: ABNORMAL
DOP CALC AO PEAK VEL: 1.26 M/S
DOP CALC AO VTI: 27.06 CM
DOP CALC LVOT AREA: 2.4 CM2
DOP CALC LVOT DIAMETER: 1.75 CM
DOP CALC LVOT PEAK VEL: 0.91 M/S
DOP CALC LVOT STROKE VOLUME: 51.5 CM3
DOP CALCLVOT PEAK VEL VTI: 21.42 CM
E WAVE DECELERATION TIME: 210.34 MSEC
E/A RATIO: 1.04
E/E' RATIO: 12.59 M/S
ECHO LV POSTERIOR WALL: 0.85 CM (ref 0.6–1.1)
EJECTION FRACTION: 55 %
EOSINOPHIL # BLD AUTO: 0.1 K/UL (ref 0–0.5)
EOSINOPHIL NFR BLD: 0.8 % (ref 0–8)
ERYTHROCYTE [DISTWIDTH] IN BLOOD BY AUTOMATED COUNT: 14.3 % (ref 11.5–14.5)
EST. GFR  (AFRICAN AMERICAN): 6.7 ML/MIN/1.73 M^2
EST. GFR  (NON AFRICAN AMERICAN): 5.8 ML/MIN/1.73 M^2
ESTIMATED AVG GLUCOSE: 117 MG/DL (ref 68–131)
FRACTIONAL SHORTENING: 31 % (ref 28–44)
GLUCOSE SERPL-MCNC: 103 MG/DL (ref 70–110)
HBA1C MFR BLD: 5.7 % (ref 4–5.6)
HCT VFR BLD AUTO: 25.1 % (ref 40–54)
HGB BLD-MCNC: 8.1 G/DL (ref 14–18)
IMM GRANULOCYTES # BLD AUTO: 0.05 K/UL (ref 0–0.04)
IMM GRANULOCYTES NFR BLD AUTO: 0.6 % (ref 0–0.5)
INTERVENTRICULAR SEPTUM: 1.29 CM (ref 0.6–1.1)
LA MAJOR: 6.27 CM
LA MINOR: 5.9 CM
LA WIDTH: 3.5 CM
LEFT ATRIUM SIZE: 4.9 CM
LEFT ATRIUM VOLUME INDEX MOD: 20.3 ML/M2
LEFT ATRIUM VOLUME INDEX: 35.6 ML/M2
LEFT ATRIUM VOLUME MOD: 50.5 CM3
LEFT ATRIUM VOLUME: 88.62 CM3
LEFT INTERNAL DIMENSION IN SYSTOLE: 3.87 CM (ref 2.1–4)
LEFT VENTRICLE DIASTOLIC VOLUME INDEX: 61.5 ML/M2
LEFT VENTRICLE DIASTOLIC VOLUME: 153.13 ML
LEFT VENTRICLE MASS INDEX: 96 G/M2
LEFT VENTRICLE SYSTOLIC VOLUME INDEX: 26.1 ML/M2
LEFT VENTRICLE SYSTOLIC VOLUME: 64.89 ML
LEFT VENTRICULAR INTERNAL DIMENSION IN DIASTOLE: 5.59 CM (ref 3.5–6)
LEFT VENTRICULAR MASS: 239.56 G
LV LATERAL E/E' RATIO: 11.89 M/S
LV SEPTAL E/E' RATIO: 13.38 M/S
LYMPHOCYTES # BLD AUTO: 0.9 K/UL (ref 1–4.8)
LYMPHOCYTES NFR BLD: 10.4 % (ref 18–48)
MAGNESIUM SERPL-MCNC: 1.6 MG/DL (ref 1.6–2.6)
MCH RBC QN AUTO: 26.9 PG (ref 27–31)
MCHC RBC AUTO-ENTMCNC: 32.3 G/DL (ref 32–36)
MCV RBC AUTO: 83 FL (ref 82–98)
MONOCYTES # BLD AUTO: 1 K/UL (ref 0.3–1)
MONOCYTES NFR BLD: 12.1 % (ref 4–15)
MV PEAK A VEL: 1.03 M/S
MV PEAK E VEL: 1.07 M/S
MV STENOSIS PRESSURE HALF TIME: 61 MS
MV VALVE AREA P 1/2 METHOD: 3.61 CM2
NEUTROPHILS # BLD AUTO: 6.4 K/UL (ref 1.8–7.7)
NEUTROPHILS NFR BLD: 76 % (ref 38–73)
NRBC BLD-RTO: 0 /100 WBC
PHOSPHATE SERPL-MCNC: 8.5 MG/DL (ref 2.7–4.5)
PLATELET # BLD AUTO: 173 K/UL (ref 150–450)
PMV BLD AUTO: 10.1 FL (ref 9.2–12.9)
POCT GLUCOSE: 104 MG/DL (ref 70–110)
POCT GLUCOSE: 132 MG/DL (ref 70–110)
POCT GLUCOSE: 136 MG/DL (ref 70–110)
POTASSIUM SERPL-SCNC: 4.5 MMOL/L (ref 3.5–5.1)
PROT SERPL-MCNC: 6.9 G/DL (ref 6–8.4)
PROT UR-MCNC: 303 MG/DL (ref 0–15)
PROT/CREAT UR: 3.12 MG/G{CREAT} (ref 0–0.2)
RA MAJOR: 6.44 CM
RA PRESSURE: 8 MMHG
RA WIDTH: 5.33 CM
RBC # BLD AUTO: 3.01 M/UL (ref 4.6–6.2)
RIGHT VENTRICULAR END-DIASTOLIC DIMENSION: 5.17 CM
RV TISSUE DOPPLER FREE WALL SYSTOLIC VELOCITY 1 (APICAL 4 CHAMBER VIEW): 18.21 CM/S
SINUS: 2.48 CM
SODIUM SERPL-SCNC: 130 MMOL/L (ref 136–145)
SODIUM UR-SCNC: 23 MMOL/L (ref 20–250)
STJ: 2.45 CM
TDI LATERAL: 0.09 M/S
TDI SEPTAL: 0.08 M/S
TDI: 0.09 M/S
TRICUSPID ANNULAR PLANE SYSTOLIC EXCURSION: 1.98 CM
WBC # BLD AUTO: 8.36 K/UL (ref 3.9–12.7)

## 2021-07-28 PROCEDURE — 25000003 PHARM REV CODE 250: Performed by: PHYSICIAN ASSISTANT

## 2021-07-28 PROCEDURE — 99223 1ST HOSP IP/OBS HIGH 75: CPT | Mod: ,,, | Performed by: PODIATRIST

## 2021-07-28 PROCEDURE — 99223 1ST HOSP IP/OBS HIGH 75: CPT | Mod: ,,, | Performed by: PHYSICIAN ASSISTANT

## 2021-07-28 PROCEDURE — 99232 SBSQ HOSP IP/OBS MODERATE 35: CPT | Mod: ,,, | Performed by: INTERNAL MEDICINE

## 2021-07-28 PROCEDURE — 25000003 PHARM REV CODE 250: Performed by: FAMILY MEDICINE

## 2021-07-28 PROCEDURE — 63600175 PHARM REV CODE 636 W HCPCS: Performed by: INTERNAL MEDICINE

## 2021-07-28 PROCEDURE — 85025 COMPLETE CBC W/AUTO DIFF WBC: CPT | Performed by: INTERNAL MEDICINE

## 2021-07-28 PROCEDURE — 11000001 HC ACUTE MED/SURG PRIVATE ROOM

## 2021-07-28 PROCEDURE — 99223 PR INITIAL HOSPITAL CARE,LEVL III: ICD-10-PCS | Mod: ,,, | Performed by: PODIATRIST

## 2021-07-28 PROCEDURE — 83735 ASSAY OF MAGNESIUM: CPT | Performed by: INTERNAL MEDICINE

## 2021-07-28 PROCEDURE — 99232 PR SUBSEQUENT HOSPITAL CARE,LEVL II: ICD-10-PCS | Mod: ,,, | Performed by: INTERNAL MEDICINE

## 2021-07-28 PROCEDURE — 99223 PR INITIAL HOSPITAL CARE,LEVL III: ICD-10-PCS | Mod: ,,, | Performed by: PHYSICIAN ASSISTANT

## 2021-07-28 PROCEDURE — 36415 COLL VENOUS BLD VENIPUNCTURE: CPT | Performed by: INTERNAL MEDICINE

## 2021-07-28 PROCEDURE — 80053 COMPREHEN METABOLIC PANEL: CPT | Performed by: INTERNAL MEDICINE

## 2021-07-28 PROCEDURE — 86706 HEP B SURFACE ANTIBODY: CPT | Performed by: INTERNAL MEDICINE

## 2021-07-28 PROCEDURE — 80074 ACUTE HEPATITIS PANEL: CPT | Performed by: INTERNAL MEDICINE

## 2021-07-28 PROCEDURE — 84100 ASSAY OF PHOSPHORUS: CPT | Performed by: INTERNAL MEDICINE

## 2021-07-28 PROCEDURE — 25000003 PHARM REV CODE 250: Performed by: INTERNAL MEDICINE

## 2021-07-28 PROCEDURE — 83036 HEMOGLOBIN GLYCOSYLATED A1C: CPT | Performed by: INTERNAL MEDICINE

## 2021-07-28 RX ORDER — SODIUM CHLORIDE 9 MG/ML
INJECTION, SOLUTION INTRAVENOUS ONCE
Status: DISCONTINUED | OUTPATIENT
Start: 2021-07-28 | End: 2021-08-02 | Stop reason: HOSPADM

## 2021-07-28 RX ORDER — GUAIFENESIN 100 MG/5ML
200 SOLUTION ORAL EVERY 4 HOURS PRN
Status: DISCONTINUED | OUTPATIENT
Start: 2021-07-28 | End: 2021-07-31

## 2021-07-28 RX ORDER — NIFEDIPINE 60 MG/1
60 TABLET, EXTENDED RELEASE ORAL DAILY
Status: DISCONTINUED | OUTPATIENT
Start: 2021-07-28 | End: 2021-08-02 | Stop reason: HOSPADM

## 2021-07-28 RX ADMIN — SODIUM BICARBONATE 650 MG TABLET 650 MG: at 02:07

## 2021-07-28 RX ADMIN — HEPARIN SODIUM 5000 UNITS: 5000 INJECTION INTRAVENOUS; SUBCUTANEOUS at 02:07

## 2021-07-28 RX ADMIN — NIFEDIPINE 60 MG: 60 TABLET, FILM COATED, EXTENDED RELEASE ORAL at 02:07

## 2021-07-28 RX ADMIN — FUROSEMIDE 40 MG: 40 TABLET ORAL at 09:07

## 2021-07-28 RX ADMIN — HYDRALAZINE HYDROCHLORIDE 100 MG: 50 TABLET ORAL at 09:07

## 2021-07-28 RX ADMIN — HEPARIN SODIUM 5000 UNITS: 5000 INJECTION INTRAVENOUS; SUBCUTANEOUS at 10:07

## 2021-07-28 RX ADMIN — MELATONIN TAB 3 MG 6 MG: 3 TAB at 10:07

## 2021-07-28 RX ADMIN — ATORVASTATIN CALCIUM 40 MG: 20 TABLET, FILM COATED ORAL at 09:07

## 2021-07-28 RX ADMIN — BENZONATATE 100 MG: 100 CAPSULE, LIQUID FILLED ORAL at 08:07

## 2021-07-28 RX ADMIN — CARVEDILOL 25 MG: 25 TABLET, FILM COATED ORAL at 09:07

## 2021-07-28 RX ADMIN — GUAIFENESIN 200 MG: 200 SOLUTION ORAL at 08:07

## 2021-07-28 RX ADMIN — SODIUM BICARBONATE 650 MG TABLET 650 MG: at 09:07

## 2021-07-28 RX ADMIN — CARVEDILOL 25 MG: 25 TABLET, FILM COATED ORAL at 08:07

## 2021-07-28 RX ADMIN — HYDRALAZINE HYDROCHLORIDE 100 MG: 50 TABLET ORAL at 08:07

## 2021-07-28 RX ADMIN — HYDRALAZINE HYDROCHLORIDE 100 MG: 50 TABLET ORAL at 02:07

## 2021-07-28 RX ADMIN — MUPIROCIN: 20 OINTMENT TOPICAL at 09:07

## 2021-07-28 RX ADMIN — SODIUM BICARBONATE 650 MG TABLET 650 MG: at 08:07

## 2021-07-28 RX ADMIN — HEPARIN SODIUM 5000 UNITS: 5000 INJECTION INTRAVENOUS; SUBCUTANEOUS at 06:07

## 2021-07-29 PROBLEM — J81.0 ACUTE PULMONARY EDEMA: Status: ACTIVE | Noted: 2021-07-29

## 2021-07-29 LAB
ALBUMIN SERPL BCP-MCNC: 2.2 G/DL (ref 3.5–5.2)
ALP SERPL-CCNC: 351 U/L (ref 55–135)
ALT SERPL W/O P-5'-P-CCNC: 22 U/L (ref 10–44)
ANION GAP SERPL CALC-SCNC: 11 MMOL/L (ref 8–16)
AST SERPL-CCNC: 26 U/L (ref 10–40)
BASOPHILS # BLD AUTO: 0.02 K/UL (ref 0–0.2)
BASOPHILS NFR BLD: 0.3 % (ref 0–1.9)
BILIRUB SERPL-MCNC: 0.4 MG/DL (ref 0.1–1)
BUN SERPL-MCNC: 113 MG/DL (ref 6–20)
CALCIUM SERPL-MCNC: 7.8 MG/DL (ref 8.7–10.5)
CHLORIDE SERPL-SCNC: 96 MMOL/L (ref 95–110)
CO2 SERPL-SCNC: 24 MMOL/L (ref 23–29)
CREAT SERPL-MCNC: 8.6 MG/DL (ref 0.5–1.4)
DIFFERENTIAL METHOD: ABNORMAL
EOSINOPHIL # BLD AUTO: 0.1 K/UL (ref 0–0.5)
EOSINOPHIL NFR BLD: 1.9 % (ref 0–8)
ERYTHROCYTE [DISTWIDTH] IN BLOOD BY AUTOMATED COUNT: 14.1 % (ref 11.5–14.5)
EST. GFR  (AFRICAN AMERICAN): 7.1 ML/MIN/1.73 M^2
EST. GFR  (NON AFRICAN AMERICAN): 6.2 ML/MIN/1.73 M^2
GLUCOSE SERPL-MCNC: 111 MG/DL (ref 70–110)
HAV IGM SERPL QL IA: NEGATIVE
HBV CORE IGM SERPL QL IA: NEGATIVE
HBV SURFACE AB SER-ACNC: NEGATIVE M[IU]/ML
HBV SURFACE AG SERPL QL IA: NEGATIVE
HBV SURFACE AG SERPL QL IA: NEGATIVE
HCT VFR BLD AUTO: 25.1 % (ref 40–54)
HCV AB SERPL QL IA: NEGATIVE
HGB BLD-MCNC: 8.1 G/DL (ref 14–18)
IMM GRANULOCYTES # BLD AUTO: 0.04 K/UL (ref 0–0.04)
IMM GRANULOCYTES NFR BLD AUTO: 0.6 % (ref 0–0.5)
IRON SERPL-MCNC: 18 UG/DL (ref 45–160)
LYMPHOCYTES # BLD AUTO: 1.1 K/UL (ref 1–4.8)
LYMPHOCYTES NFR BLD: 15.9 % (ref 18–48)
MAGNESIUM SERPL-MCNC: 1.9 MG/DL (ref 1.6–2.6)
MCH RBC QN AUTO: 26.9 PG (ref 27–31)
MCHC RBC AUTO-ENTMCNC: 32.3 G/DL (ref 32–36)
MCV RBC AUTO: 83 FL (ref 82–98)
MONOCYTES # BLD AUTO: 0.9 K/UL (ref 0.3–1)
MONOCYTES NFR BLD: 12.7 % (ref 4–15)
NEUTROPHILS # BLD AUTO: 4.8 K/UL (ref 1.8–7.7)
NEUTROPHILS NFR BLD: 68.6 % (ref 38–73)
NRBC BLD-RTO: 0 /100 WBC
PHOSPHATE SERPL-MCNC: 8.7 MG/DL (ref 2.7–4.5)
PLATELET # BLD AUTO: 198 K/UL (ref 150–450)
PMV BLD AUTO: 10.2 FL (ref 9.2–12.9)
POCT GLUCOSE: 115 MG/DL (ref 70–110)
POCT GLUCOSE: 117 MG/DL (ref 70–110)
POCT GLUCOSE: 169 MG/DL (ref 70–110)
POTASSIUM SERPL-SCNC: 4.2 MMOL/L (ref 3.5–5.1)
PROT SERPL-MCNC: 6.5 G/DL (ref 6–8.4)
RBC # BLD AUTO: 3.01 M/UL (ref 4.6–6.2)
SATURATED IRON: 7 % (ref 20–50)
SODIUM SERPL-SCNC: 131 MMOL/L (ref 136–145)
TOTAL IRON BINDING CAPACITY: 247 UG/DL (ref 250–450)
TRANSFERRIN SERPL-MCNC: 167 MG/DL (ref 200–375)
WBC # BLD AUTO: 6.93 K/UL (ref 3.9–12.7)

## 2021-07-29 PROCEDURE — 99232 SBSQ HOSP IP/OBS MODERATE 35: CPT | Mod: ,,, | Performed by: INTERNAL MEDICINE

## 2021-07-29 PROCEDURE — 99233 SBSQ HOSP IP/OBS HIGH 50: CPT | Mod: ,,, | Performed by: NURSE PRACTITIONER

## 2021-07-29 PROCEDURE — 99233 PR SUBSEQUENT HOSPITAL CARE,LEVL III: ICD-10-PCS | Mod: ,,, | Performed by: HOSPITALIST

## 2021-07-29 PROCEDURE — 99232 PR SUBSEQUENT HOSPITAL CARE,LEVL II: ICD-10-PCS | Mod: ,,, | Performed by: INTERNAL MEDICINE

## 2021-07-29 PROCEDURE — 85025 COMPLETE CBC W/AUTO DIFF WBC: CPT | Performed by: INTERNAL MEDICINE

## 2021-07-29 PROCEDURE — 83540 ASSAY OF IRON: CPT | Performed by: STUDENT IN AN ORGANIZED HEALTH CARE EDUCATION/TRAINING PROGRAM

## 2021-07-29 PROCEDURE — 99233 PR SUBSEQUENT HOSPITAL CARE,LEVL III: ICD-10-PCS | Mod: ,,, | Performed by: NURSE PRACTITIONER

## 2021-07-29 PROCEDURE — 63600175 PHARM REV CODE 636 W HCPCS: Performed by: INTERNAL MEDICINE

## 2021-07-29 PROCEDURE — 84100 ASSAY OF PHOSPHORUS: CPT | Performed by: INTERNAL MEDICINE

## 2021-07-29 PROCEDURE — 63600175 PHARM REV CODE 636 W HCPCS: Performed by: STUDENT IN AN ORGANIZED HEALTH CARE EDUCATION/TRAINING PROGRAM

## 2021-07-29 PROCEDURE — 25000003 PHARM REV CODE 250: Performed by: INTERNAL MEDICINE

## 2021-07-29 PROCEDURE — 11000001 HC ACUTE MED/SURG PRIVATE ROOM

## 2021-07-29 PROCEDURE — 99233 SBSQ HOSP IP/OBS HIGH 50: CPT | Mod: ,,, | Performed by: HOSPITALIST

## 2021-07-29 PROCEDURE — 25000003 PHARM REV CODE 250: Performed by: STUDENT IN AN ORGANIZED HEALTH CARE EDUCATION/TRAINING PROGRAM

## 2021-07-29 PROCEDURE — 25000003 PHARM REV CODE 250: Performed by: FAMILY MEDICINE

## 2021-07-29 PROCEDURE — 25000003 PHARM REV CODE 250: Performed by: PHYSICIAN ASSISTANT

## 2021-07-29 PROCEDURE — 83735 ASSAY OF MAGNESIUM: CPT | Performed by: INTERNAL MEDICINE

## 2021-07-29 PROCEDURE — 80053 COMPREHEN METABOLIC PANEL: CPT | Performed by: INTERNAL MEDICINE

## 2021-07-29 RX ORDER — LIDOCAINE HYDROCHLORIDE 10 MG/ML
INJECTION INFILTRATION; PERINEURAL CODE/TRAUMA/SEDATION MEDICATION
Status: COMPLETED | OUTPATIENT
Start: 2021-07-29 | End: 2021-07-29

## 2021-07-29 RX ORDER — CEFAZOLIN SODIUM 1 G/50ML
SOLUTION INTRAVENOUS
Status: COMPLETED | OUTPATIENT
Start: 2021-07-29 | End: 2021-07-29

## 2021-07-29 RX ORDER — MIDAZOLAM HYDROCHLORIDE 1 MG/ML
INJECTION INTRAMUSCULAR; INTRAVENOUS CODE/TRAUMA/SEDATION MEDICATION
Status: COMPLETED | OUTPATIENT
Start: 2021-07-29 | End: 2021-07-29

## 2021-07-29 RX ORDER — FENTANYL CITRATE 50 UG/ML
INJECTION, SOLUTION INTRAMUSCULAR; INTRAVENOUS CODE/TRAUMA/SEDATION MEDICATION
Status: COMPLETED | OUTPATIENT
Start: 2021-07-29 | End: 2021-07-29

## 2021-07-29 RX ADMIN — GUAIFENESIN 200 MG: 200 SOLUTION ORAL at 09:07

## 2021-07-29 RX ADMIN — MUPIROCIN: 20 OINTMENT TOPICAL at 09:07

## 2021-07-29 RX ADMIN — MIDAZOLAM HYDROCHLORIDE 1 MG: 1 INJECTION, SOLUTION INTRAMUSCULAR; INTRAVENOUS at 03:07

## 2021-07-29 RX ADMIN — HYDRALAZINE HYDROCHLORIDE 100 MG: 50 TABLET ORAL at 08:07

## 2021-07-29 RX ADMIN — CARVEDILOL 25 MG: 25 TABLET, FILM COATED ORAL at 08:07

## 2021-07-29 RX ADMIN — CARVEDILOL 25 MG: 25 TABLET, FILM COATED ORAL at 09:07

## 2021-07-29 RX ADMIN — HEPARIN SODIUM 5000 UNITS: 5000 INJECTION INTRAVENOUS; SUBCUTANEOUS at 09:07

## 2021-07-29 RX ADMIN — ATORVASTATIN CALCIUM 40 MG: 20 TABLET, FILM COATED ORAL at 08:07

## 2021-07-29 RX ADMIN — HYDRALAZINE HYDROCHLORIDE 100 MG: 50 TABLET ORAL at 09:07

## 2021-07-29 RX ADMIN — SODIUM BICARBONATE 650 MG TABLET 650 MG: at 08:07

## 2021-07-29 RX ADMIN — BENZONATATE 100 MG: 100 CAPSULE, LIQUID FILLED ORAL at 05:07

## 2021-07-29 RX ADMIN — CEFAZOLIN SODIUM 1 G: 1 SOLUTION INTRAVENOUS at 04:07

## 2021-07-29 RX ADMIN — GUAIFENESIN 200 MG: 200 SOLUTION ORAL at 05:07

## 2021-07-29 RX ADMIN — BENZONATATE 100 MG: 100 CAPSULE, LIQUID FILLED ORAL at 09:07

## 2021-07-29 RX ADMIN — FUROSEMIDE 40 MG: 40 TABLET ORAL at 08:07

## 2021-07-29 RX ADMIN — SODIUM BICARBONATE 650 MG TABLET 650 MG: at 09:07

## 2021-07-29 RX ADMIN — NIFEDIPINE 60 MG: 60 TABLET, FILM COATED, EXTENDED RELEASE ORAL at 08:07

## 2021-07-29 RX ADMIN — FENTANYL CITRATE 50 MCG: 50 INJECTION INTRAMUSCULAR; INTRAVENOUS at 03:07

## 2021-07-29 RX ADMIN — MUPIROCIN: 20 OINTMENT TOPICAL at 08:07

## 2021-07-29 RX ADMIN — LIDOCAINE HYDROCHLORIDE 10 ML: 10 INJECTION, SOLUTION INFILTRATION; PERINEURAL at 04:07

## 2021-07-29 RX ADMIN — MELATONIN TAB 3 MG 6 MG: 3 TAB at 09:07

## 2021-07-30 DIAGNOSIS — Z01.818 PRE-OPERATIVE CLEARANCE: ICD-10-CM

## 2021-07-30 LAB
ALBUMIN SERPL BCP-MCNC: 2 G/DL (ref 3.5–5.2)
ALP SERPL-CCNC: 334 U/L (ref 55–135)
ALT SERPL W/O P-5'-P-CCNC: 20 U/L (ref 10–44)
ANION GAP SERPL CALC-SCNC: 17 MMOL/L (ref 8–16)
AST SERPL-CCNC: 25 U/L (ref 10–40)
BASOPHILS # BLD AUTO: 0.02 K/UL (ref 0–0.2)
BASOPHILS NFR BLD: 0.4 % (ref 0–1.9)
BILIRUB SERPL-MCNC: 0.3 MG/DL (ref 0.1–1)
BUN SERPL-MCNC: 115 MG/DL (ref 6–20)
CALCIUM SERPL-MCNC: 7.9 MG/DL (ref 8.7–10.5)
CHLORIDE SERPL-SCNC: 98 MMOL/L (ref 95–110)
CO2 SERPL-SCNC: 17 MMOL/L (ref 23–29)
CREAT SERPL-MCNC: 9.2 MG/DL (ref 0.5–1.4)
DIFFERENTIAL METHOD: ABNORMAL
EOSINOPHIL # BLD AUTO: 0.2 K/UL (ref 0–0.5)
EOSINOPHIL NFR BLD: 4.1 % (ref 0–8)
ERYTHROCYTE [DISTWIDTH] IN BLOOD BY AUTOMATED COUNT: 14 % (ref 11.5–14.5)
EST. GFR  (AFRICAN AMERICAN): 6.6 ML/MIN/1.73 M^2
EST. GFR  (NON AFRICAN AMERICAN): 5.7 ML/MIN/1.73 M^2
FERRITIN SERPL-MCNC: 142 NG/ML (ref 20–300)
GLUCOSE SERPL-MCNC: 104 MG/DL (ref 70–110)
HCT VFR BLD AUTO: 23.8 % (ref 40–54)
HGB BLD-MCNC: 7.9 G/DL (ref 14–18)
IMM GRANULOCYTES # BLD AUTO: 0.03 K/UL (ref 0–0.04)
IMM GRANULOCYTES NFR BLD AUTO: 0.6 % (ref 0–0.5)
LYMPHOCYTES # BLD AUTO: 0.9 K/UL (ref 1–4.8)
LYMPHOCYTES NFR BLD: 16.6 % (ref 18–48)
MAGNESIUM SERPL-MCNC: 1.9 MG/DL (ref 1.6–2.6)
MCH RBC QN AUTO: 27.4 PG (ref 27–31)
MCHC RBC AUTO-ENTMCNC: 33.2 G/DL (ref 32–36)
MCV RBC AUTO: 83 FL (ref 82–98)
MONOCYTES # BLD AUTO: 0.7 K/UL (ref 0.3–1)
MONOCYTES NFR BLD: 13.7 % (ref 4–15)
NEUTROPHILS # BLD AUTO: 3.3 K/UL (ref 1.8–7.7)
NEUTROPHILS NFR BLD: 64.6 % (ref 38–73)
NRBC BLD-RTO: 0 /100 WBC
PHOSPHATE SERPL-MCNC: 9.2 MG/DL (ref 2.7–4.5)
PLATELET # BLD AUTO: 200 K/UL (ref 150–450)
PMV BLD AUTO: 10.1 FL (ref 9.2–12.9)
POCT GLUCOSE: 105 MG/DL (ref 70–110)
POCT GLUCOSE: 107 MG/DL (ref 70–110)
POCT GLUCOSE: 115 MG/DL (ref 70–110)
POCT GLUCOSE: 125 MG/DL (ref 70–110)
POCT GLUCOSE: 151 MG/DL (ref 70–110)
POTASSIUM SERPL-SCNC: 4.3 MMOL/L (ref 3.5–5.1)
PROT SERPL-MCNC: 6.2 G/DL (ref 6–8.4)
RBC # BLD AUTO: 2.88 M/UL (ref 4.6–6.2)
SODIUM SERPL-SCNC: 132 MMOL/L (ref 136–145)
WBC # BLD AUTO: 5.11 K/UL (ref 3.9–12.7)

## 2021-07-30 PROCEDURE — 85025 COMPLETE CBC W/AUTO DIFF WBC: CPT | Performed by: INTERNAL MEDICINE

## 2021-07-30 PROCEDURE — 63600175 PHARM REV CODE 636 W HCPCS: Performed by: GENERAL PRACTICE

## 2021-07-30 PROCEDURE — 90935 HEMODIALYSIS ONE EVALUATION: CPT | Mod: ,,, | Performed by: INTERNAL MEDICINE

## 2021-07-30 PROCEDURE — 63600175 PHARM REV CODE 636 W HCPCS: Performed by: INTERNAL MEDICINE

## 2021-07-30 PROCEDURE — 11000001 HC ACUTE MED/SURG PRIVATE ROOM

## 2021-07-30 PROCEDURE — 83735 ASSAY OF MAGNESIUM: CPT | Performed by: INTERNAL MEDICINE

## 2021-07-30 PROCEDURE — 90935 HEMODIALYSIS ONE EVALUATION: CPT

## 2021-07-30 PROCEDURE — 99232 PR SUBSEQUENT HOSPITAL CARE,LEVL II: ICD-10-PCS | Mod: ,,, | Performed by: HOSPITALIST

## 2021-07-30 PROCEDURE — 90935 PR HEMODIALYSIS, ONE EVALUATION: ICD-10-PCS | Mod: ,,, | Performed by: INTERNAL MEDICINE

## 2021-07-30 PROCEDURE — 25000003 PHARM REV CODE 250: Performed by: PHYSICIAN ASSISTANT

## 2021-07-30 PROCEDURE — 99233 SBSQ HOSP IP/OBS HIGH 50: CPT | Mod: ,,, | Performed by: PODIATRIST

## 2021-07-30 PROCEDURE — 25000003 PHARM REV CODE 250: Performed by: STUDENT IN AN ORGANIZED HEALTH CARE EDUCATION/TRAINING PROGRAM

## 2021-07-30 PROCEDURE — 82728 ASSAY OF FERRITIN: CPT | Performed by: HOSPITALIST

## 2021-07-30 PROCEDURE — 99232 SBSQ HOSP IP/OBS MODERATE 35: CPT | Mod: ,,, | Performed by: HOSPITALIST

## 2021-07-30 PROCEDURE — 25000003 PHARM REV CODE 250: Performed by: FAMILY MEDICINE

## 2021-07-30 PROCEDURE — 25000003 PHARM REV CODE 250: Performed by: INTERNAL MEDICINE

## 2021-07-30 PROCEDURE — 99233 PR SUBSEQUENT HOSPITAL CARE,LEVL III: ICD-10-PCS | Mod: ,,, | Performed by: PODIATRIST

## 2021-07-30 PROCEDURE — 84100 ASSAY OF PHOSPHORUS: CPT | Performed by: INTERNAL MEDICINE

## 2021-07-30 PROCEDURE — 80053 COMPREHEN METABOLIC PANEL: CPT | Performed by: INTERNAL MEDICINE

## 2021-07-30 RX ORDER — SEVELAMER CARBONATE 800 MG/1
800 TABLET, FILM COATED ORAL
Status: DISCONTINUED | OUTPATIENT
Start: 2021-07-30 | End: 2021-08-02 | Stop reason: HOSPADM

## 2021-07-30 RX ORDER — HEPARIN SODIUM 1000 [USP'U]/ML
1000 INJECTION, SOLUTION INTRAVENOUS; SUBCUTANEOUS
Status: DISCONTINUED | OUTPATIENT
Start: 2021-07-30 | End: 2021-08-02 | Stop reason: HOSPADM

## 2021-07-30 RX ORDER — SODIUM BICARBONATE 650 MG/1
1300 TABLET ORAL 3 TIMES DAILY
Status: DISCONTINUED | OUTPATIENT
Start: 2021-07-30 | End: 2021-07-31

## 2021-07-30 RX ADMIN — MELATONIN TAB 3 MG 6 MG: 3 TAB at 09:07

## 2021-07-30 RX ADMIN — CARVEDILOL 25 MG: 25 TABLET, FILM COATED ORAL at 09:07

## 2021-07-30 RX ADMIN — SEVELAMER CARBONATE 800 MG: 800 TABLET, FILM COATED ORAL at 05:07

## 2021-07-30 RX ADMIN — SODIUM BICARBONATE 650 MG TABLET 650 MG: at 09:07

## 2021-07-30 RX ADMIN — SODIUM BICARBONATE 650 MG TABLET 1300 MG: at 09:07

## 2021-07-30 RX ADMIN — FUROSEMIDE 40 MG: 40 TABLET ORAL at 11:07

## 2021-07-30 RX ADMIN — BENZONATATE 100 MG: 100 CAPSULE, LIQUID FILLED ORAL at 06:07

## 2021-07-30 RX ADMIN — HEPARIN SODIUM 5000 UNITS: 5000 INJECTION INTRAVENOUS; SUBCUTANEOUS at 01:07

## 2021-07-30 RX ADMIN — HEPARIN SODIUM 1000 UNITS: 1000 INJECTION, SOLUTION INTRAVENOUS; SUBCUTANEOUS at 10:07

## 2021-07-30 RX ADMIN — HYDRALAZINE HYDROCHLORIDE 100 MG: 50 TABLET ORAL at 11:07

## 2021-07-30 RX ADMIN — NIFEDIPINE 60 MG: 60 TABLET, FILM COATED, EXTENDED RELEASE ORAL at 11:07

## 2021-07-30 RX ADMIN — HYDRALAZINE HYDROCHLORIDE 100 MG: 50 TABLET ORAL at 09:07

## 2021-07-30 RX ADMIN — SODIUM BICARBONATE 650 MG TABLET 1300 MG: at 03:07

## 2021-07-30 RX ADMIN — ATORVASTATIN CALCIUM 40 MG: 20 TABLET, FILM COATED ORAL at 11:07

## 2021-07-30 RX ADMIN — SEVELAMER CARBONATE 800 MG: 800 TABLET, FILM COATED ORAL at 11:07

## 2021-07-30 RX ADMIN — MUPIROCIN: 20 OINTMENT TOPICAL at 11:07

## 2021-07-30 RX ADMIN — CARVEDILOL 25 MG: 25 TABLET, FILM COATED ORAL at 11:07

## 2021-07-30 RX ADMIN — HYDRALAZINE HYDROCHLORIDE 100 MG: 50 TABLET ORAL at 03:07

## 2021-07-30 RX ADMIN — HEPARIN SODIUM 5000 UNITS: 5000 INJECTION INTRAVENOUS; SUBCUTANEOUS at 09:07

## 2021-07-30 RX ADMIN — GUAIFENESIN 200 MG: 200 SOLUTION ORAL at 10:07

## 2021-07-30 RX ADMIN — MUPIROCIN: 20 OINTMENT TOPICAL at 09:07

## 2021-07-30 RX ADMIN — GUAIFENESIN 200 MG: 200 SOLUTION ORAL at 06:07

## 2021-07-31 LAB
ALBUMIN SERPL BCP-MCNC: 2 G/DL (ref 3.5–5.2)
ALP SERPL-CCNC: 340 U/L (ref 55–135)
ALT SERPL W/O P-5'-P-CCNC: 12 U/L (ref 10–44)
ANION GAP SERPL CALC-SCNC: 10 MMOL/L (ref 8–16)
AST SERPL-CCNC: 22 U/L (ref 10–40)
BASOPHILS # BLD AUTO: 0.02 K/UL (ref 0–0.2)
BASOPHILS NFR BLD: 0.4 % (ref 0–1.9)
BILIRUB SERPL-MCNC: 0.4 MG/DL (ref 0.1–1)
BUN SERPL-MCNC: 81 MG/DL (ref 6–20)
CALCIUM SERPL-MCNC: 7.8 MG/DL (ref 8.7–10.5)
CHLORIDE SERPL-SCNC: 98 MMOL/L (ref 95–110)
CO2 SERPL-SCNC: 26 MMOL/L (ref 23–29)
CREAT SERPL-MCNC: 6.9 MG/DL (ref 0.5–1.4)
DIFFERENTIAL METHOD: ABNORMAL
EOSINOPHIL # BLD AUTO: 0.2 K/UL (ref 0–0.5)
EOSINOPHIL NFR BLD: 3.6 % (ref 0–8)
ERYTHROCYTE [DISTWIDTH] IN BLOOD BY AUTOMATED COUNT: 14 % (ref 11.5–14.5)
EST. GFR  (AFRICAN AMERICAN): 9.3 ML/MIN/1.73 M^2
EST. GFR  (NON AFRICAN AMERICAN): 8.1 ML/MIN/1.73 M^2
GLUCOSE SERPL-MCNC: 100 MG/DL (ref 70–110)
HCT VFR BLD AUTO: 23.4 % (ref 40–54)
HGB BLD-MCNC: 7.8 G/DL (ref 14–18)
IMM GRANULOCYTES # BLD AUTO: 0.02 K/UL (ref 0–0.04)
IMM GRANULOCYTES NFR BLD AUTO: 0.4 % (ref 0–0.5)
LYMPHOCYTES # BLD AUTO: 0.8 K/UL (ref 1–4.8)
LYMPHOCYTES NFR BLD: 17.5 % (ref 18–48)
MAGNESIUM SERPL-MCNC: 1.8 MG/DL (ref 1.6–2.6)
MCH RBC QN AUTO: 27.3 PG (ref 27–31)
MCHC RBC AUTO-ENTMCNC: 33.3 G/DL (ref 32–36)
MCV RBC AUTO: 82 FL (ref 82–98)
MONOCYTES # BLD AUTO: 0.3 K/UL (ref 0.3–1)
MONOCYTES NFR BLD: 5.6 % (ref 4–15)
NEUTROPHILS # BLD AUTO: 3.2 K/UL (ref 1.8–7.7)
NEUTROPHILS NFR BLD: 72.5 % (ref 38–73)
NRBC BLD-RTO: 0 /100 WBC
PHOSPHATE SERPL-MCNC: 6.7 MG/DL (ref 2.7–4.5)
PLATELET # BLD AUTO: 189 K/UL (ref 150–450)
PMV BLD AUTO: 10.8 FL (ref 9.2–12.9)
POCT GLUCOSE: 101 MG/DL (ref 70–110)
POCT GLUCOSE: 102 MG/DL (ref 70–110)
POCT GLUCOSE: 132 MG/DL (ref 70–110)
POCT GLUCOSE: 92 MG/DL (ref 70–110)
POCT GLUCOSE: 96 MG/DL (ref 70–110)
POTASSIUM SERPL-SCNC: 4 MMOL/L (ref 3.5–5.1)
PROT SERPL-MCNC: 6.3 G/DL (ref 6–8.4)
RBC # BLD AUTO: 2.86 M/UL (ref 4.6–6.2)
SODIUM SERPL-SCNC: 134 MMOL/L (ref 136–145)
WBC # BLD AUTO: 4.45 K/UL (ref 3.9–12.7)

## 2021-07-31 PROCEDURE — 94761 N-INVAS EAR/PLS OXIMETRY MLT: CPT

## 2021-07-31 PROCEDURE — 63600175 PHARM REV CODE 636 W HCPCS: Performed by: INTERNAL MEDICINE

## 2021-07-31 PROCEDURE — 25000003 PHARM REV CODE 250: Performed by: STUDENT IN AN ORGANIZED HEALTH CARE EDUCATION/TRAINING PROGRAM

## 2021-07-31 PROCEDURE — 90935 HEMODIALYSIS ONE EVALUATION: CPT | Mod: ,,, | Performed by: INTERNAL MEDICINE

## 2021-07-31 PROCEDURE — 90935 PR HEMODIALYSIS, ONE EVALUATION: ICD-10-PCS | Mod: ,,, | Performed by: INTERNAL MEDICINE

## 2021-07-31 PROCEDURE — 99900035 HC TECH TIME PER 15 MIN (STAT)

## 2021-07-31 PROCEDURE — 99232 SBSQ HOSP IP/OBS MODERATE 35: CPT | Mod: GT,,, | Performed by: INTERNAL MEDICINE

## 2021-07-31 PROCEDURE — 25000003 PHARM REV CODE 250: Performed by: PHYSICIAN ASSISTANT

## 2021-07-31 PROCEDURE — 84100 ASSAY OF PHOSPHORUS: CPT | Performed by: INTERNAL MEDICINE

## 2021-07-31 PROCEDURE — 83735 ASSAY OF MAGNESIUM: CPT | Performed by: INTERNAL MEDICINE

## 2021-07-31 PROCEDURE — 27000221 HC OXYGEN, UP TO 24 HOURS

## 2021-07-31 PROCEDURE — 63600175 PHARM REV CODE 636 W HCPCS: Performed by: GENERAL PRACTICE

## 2021-07-31 PROCEDURE — 25000003 PHARM REV CODE 250: Performed by: INTERNAL MEDICINE

## 2021-07-31 PROCEDURE — 11000001 HC ACUTE MED/SURG PRIVATE ROOM

## 2021-07-31 PROCEDURE — 80053 COMPREHEN METABOLIC PANEL: CPT | Performed by: INTERNAL MEDICINE

## 2021-07-31 PROCEDURE — 25000242 PHARM REV CODE 250 ALT 637 W/ HCPCS: Performed by: INTERNAL MEDICINE

## 2021-07-31 PROCEDURE — 90935 HEMODIALYSIS ONE EVALUATION: CPT

## 2021-07-31 PROCEDURE — 94640 AIRWAY INHALATION TREATMENT: CPT

## 2021-07-31 PROCEDURE — 85025 COMPLETE CBC W/AUTO DIFF WBC: CPT | Performed by: INTERNAL MEDICINE

## 2021-07-31 PROCEDURE — 99232 PR SUBSEQUENT HOSPITAL CARE,LEVL II: ICD-10-PCS | Mod: GT,,, | Performed by: INTERNAL MEDICINE

## 2021-07-31 PROCEDURE — 25000003 PHARM REV CODE 250: Performed by: FAMILY MEDICINE

## 2021-07-31 RX ORDER — GUAIFENESIN 600 MG/1
600 TABLET, EXTENDED RELEASE ORAL 2 TIMES DAILY
Status: DISCONTINUED | OUTPATIENT
Start: 2021-07-31 | End: 2021-07-31

## 2021-07-31 RX ORDER — SODIUM BICARBONATE 650 MG/1
650 TABLET ORAL 3 TIMES DAILY
Status: DISCONTINUED | OUTPATIENT
Start: 2021-07-31 | End: 2021-08-02 | Stop reason: HOSPADM

## 2021-07-31 RX ORDER — GUAIFENESIN 600 MG/1
600 TABLET, EXTENDED RELEASE ORAL
Status: DISCONTINUED | OUTPATIENT
Start: 2021-07-31 | End: 2021-08-02 | Stop reason: HOSPADM

## 2021-07-31 RX ORDER — ALBUTEROL SULFATE 2.5 MG/.5ML
2.5 SOLUTION RESPIRATORY (INHALATION) 3 TIMES DAILY
Status: DISCONTINUED | OUTPATIENT
Start: 2021-07-31 | End: 2021-08-02 | Stop reason: HOSPADM

## 2021-07-31 RX ADMIN — GUAIFENESIN 600 MG: 600 TABLET, EXTENDED RELEASE ORAL at 08:07

## 2021-07-31 RX ADMIN — BENZONATATE 100 MG: 100 CAPSULE, LIQUID FILLED ORAL at 09:07

## 2021-07-31 RX ADMIN — BENZONATATE 100 MG: 100 CAPSULE, LIQUID FILLED ORAL at 06:07

## 2021-07-31 RX ADMIN — HEPARIN SODIUM 1000 UNITS: 1000 INJECTION, SOLUTION INTRAVENOUS; SUBCUTANEOUS at 10:07

## 2021-07-31 RX ADMIN — MELATONIN TAB 3 MG 6 MG: 3 TAB at 09:07

## 2021-07-31 RX ADMIN — HYDRALAZINE HYDROCHLORIDE 100 MG: 50 TABLET ORAL at 11:07

## 2021-07-31 RX ADMIN — GUAIFENESIN 200 MG: 200 SOLUTION ORAL at 06:07

## 2021-07-31 RX ADMIN — GUAIFENESIN 600 MG: 600 TABLET, EXTENDED RELEASE ORAL at 11:07

## 2021-07-31 RX ADMIN — HEPARIN SODIUM 5000 UNITS: 5000 INJECTION INTRAVENOUS; SUBCUTANEOUS at 03:07

## 2021-07-31 RX ADMIN — SODIUM BICARBONATE 650 MG TABLET 1300 MG: at 03:07

## 2021-07-31 RX ADMIN — SEVELAMER CARBONATE 800 MG: 800 TABLET, FILM COATED ORAL at 06:07

## 2021-07-31 RX ADMIN — HYDRALAZINE HYDROCHLORIDE 100 MG: 50 TABLET ORAL at 08:07

## 2021-07-31 RX ADMIN — NIFEDIPINE 60 MG: 60 TABLET, FILM COATED, EXTENDED RELEASE ORAL at 11:07

## 2021-07-31 RX ADMIN — SODIUM BICARBONATE 650 MG TABLET 1300 MG: at 11:07

## 2021-07-31 RX ADMIN — HEPARIN SODIUM 5000 UNITS: 5000 INJECTION INTRAVENOUS; SUBCUTANEOUS at 06:07

## 2021-07-31 RX ADMIN — IRON SUCROSE 200 MG: 20 INJECTION, SOLUTION INTRAVENOUS at 03:07

## 2021-07-31 RX ADMIN — FUROSEMIDE 40 MG: 40 TABLET ORAL at 11:07

## 2021-07-31 RX ADMIN — BENZONATATE 100 MG: 100 CAPSULE, LIQUID FILLED ORAL at 11:07

## 2021-07-31 RX ADMIN — ALBUTEROL SULFATE 2.5 MG: 2.5 SOLUTION RESPIRATORY (INHALATION) at 02:07

## 2021-07-31 RX ADMIN — CARVEDILOL 25 MG: 25 TABLET, FILM COATED ORAL at 08:07

## 2021-07-31 RX ADMIN — ATORVASTATIN CALCIUM 40 MG: 20 TABLET, FILM COATED ORAL at 11:07

## 2021-07-31 RX ADMIN — HYDRALAZINE HYDROCHLORIDE 100 MG: 50 TABLET ORAL at 03:07

## 2021-07-31 RX ADMIN — HEPARIN SODIUM 5000 UNITS: 5000 INJECTION INTRAVENOUS; SUBCUTANEOUS at 09:07

## 2021-07-31 RX ADMIN — SEVELAMER CARBONATE 800 MG: 800 TABLET, FILM COATED ORAL at 12:07

## 2021-07-31 RX ADMIN — CARVEDILOL 25 MG: 25 TABLET, FILM COATED ORAL at 11:07

## 2021-07-31 RX ADMIN — SODIUM BICARBONATE 650 MG TABLET 650 MG: at 08:07

## 2021-07-31 RX ADMIN — ALBUTEROL SULFATE 2.5 MG: 2.5 SOLUTION RESPIRATORY (INHALATION) at 11:07

## 2021-08-01 LAB
ALBUMIN SERPL BCP-MCNC: 2 G/DL (ref 3.5–5.2)
ALP SERPL-CCNC: 320 U/L (ref 55–135)
ALT SERPL W/O P-5'-P-CCNC: 14 U/L (ref 10–44)
ANION GAP SERPL CALC-SCNC: 15 MMOL/L (ref 8–16)
AST SERPL-CCNC: 22 U/L (ref 10–40)
BASOPHILS # BLD AUTO: 0.02 K/UL (ref 0–0.2)
BASOPHILS NFR BLD: 0.4 % (ref 0–1.9)
BILIRUB SERPL-MCNC: 0.4 MG/DL (ref 0.1–1)
BUN SERPL-MCNC: 70 MG/DL (ref 6–20)
CALCIUM SERPL-MCNC: 8 MG/DL (ref 8.7–10.5)
CHLORIDE SERPL-SCNC: 99 MMOL/L (ref 95–110)
CO2 SERPL-SCNC: 21 MMOL/L (ref 23–29)
CREAT SERPL-MCNC: 6.1 MG/DL (ref 0.5–1.4)
DIFFERENTIAL METHOD: ABNORMAL
EOSINOPHIL # BLD AUTO: 0.2 K/UL (ref 0–0.5)
EOSINOPHIL NFR BLD: 3.8 % (ref 0–8)
ERYTHROCYTE [DISTWIDTH] IN BLOOD BY AUTOMATED COUNT: 14.1 % (ref 11.5–14.5)
EST. GFR  (AFRICAN AMERICAN): 10.8 ML/MIN/1.73 M^2
EST. GFR  (NON AFRICAN AMERICAN): 9.3 ML/MIN/1.73 M^2
GLUCOSE SERPL-MCNC: 77 MG/DL (ref 70–110)
HCT VFR BLD AUTO: 24.6 % (ref 40–54)
HGB BLD-MCNC: 7.9 G/DL (ref 14–18)
IMM GRANULOCYTES # BLD AUTO: 0.05 K/UL (ref 0–0.04)
IMM GRANULOCYTES NFR BLD AUTO: 1 % (ref 0–0.5)
LYMPHOCYTES # BLD AUTO: 0.9 K/UL (ref 1–4.8)
LYMPHOCYTES NFR BLD: 17.3 % (ref 18–48)
MAGNESIUM SERPL-MCNC: 1.8 MG/DL (ref 1.6–2.6)
MCH RBC QN AUTO: 27.1 PG (ref 27–31)
MCHC RBC AUTO-ENTMCNC: 32.1 G/DL (ref 32–36)
MCV RBC AUTO: 84 FL (ref 82–98)
MONOCYTES # BLD AUTO: 0.7 K/UL (ref 0.3–1)
MONOCYTES NFR BLD: 14.1 % (ref 4–15)
NEUTROPHILS # BLD AUTO: 3.2 K/UL (ref 1.8–7.7)
NEUTROPHILS NFR BLD: 63.4 % (ref 38–73)
NRBC BLD-RTO: 0 /100 WBC
PHOSPHATE SERPL-MCNC: 5.5 MG/DL (ref 2.7–4.5)
PLATELET # BLD AUTO: 200 K/UL (ref 150–450)
PMV BLD AUTO: 10.5 FL (ref 9.2–12.9)
POCT GLUCOSE: 106 MG/DL (ref 70–110)
POCT GLUCOSE: 110 MG/DL (ref 70–110)
POCT GLUCOSE: 119 MG/DL (ref 70–110)
POCT GLUCOSE: 84 MG/DL (ref 70–110)
POCT GLUCOSE: 96 MG/DL (ref 70–110)
POTASSIUM SERPL-SCNC: 4 MMOL/L (ref 3.5–5.1)
PROT SERPL-MCNC: 6.1 G/DL (ref 6–8.4)
RBC # BLD AUTO: 2.92 M/UL (ref 4.6–6.2)
SODIUM SERPL-SCNC: 135 MMOL/L (ref 136–145)
WBC # BLD AUTO: 5.04 K/UL (ref 3.9–12.7)

## 2021-08-01 PROCEDURE — 83735 ASSAY OF MAGNESIUM: CPT | Performed by: INTERNAL MEDICINE

## 2021-08-01 PROCEDURE — 25000003 PHARM REV CODE 250: Performed by: STUDENT IN AN ORGANIZED HEALTH CARE EDUCATION/TRAINING PROGRAM

## 2021-08-01 PROCEDURE — 94664 DEMO&/EVAL PT USE INHALER: CPT

## 2021-08-01 PROCEDURE — 25000003 PHARM REV CODE 250: Performed by: INTERNAL MEDICINE

## 2021-08-01 PROCEDURE — 36415 COLL VENOUS BLD VENIPUNCTURE: CPT | Performed by: INTERNAL MEDICINE

## 2021-08-01 PROCEDURE — 94761 N-INVAS EAR/PLS OXIMETRY MLT: CPT

## 2021-08-01 PROCEDURE — 25000242 PHARM REV CODE 250 ALT 637 W/ HCPCS: Performed by: INTERNAL MEDICINE

## 2021-08-01 PROCEDURE — 94640 AIRWAY INHALATION TREATMENT: CPT

## 2021-08-01 PROCEDURE — 84100 ASSAY OF PHOSPHORUS: CPT | Performed by: INTERNAL MEDICINE

## 2021-08-01 PROCEDURE — 99232 SBSQ HOSP IP/OBS MODERATE 35: CPT | Mod: GT,,, | Performed by: INTERNAL MEDICINE

## 2021-08-01 PROCEDURE — 27000646 HC AEROBIKA DEVICE

## 2021-08-01 PROCEDURE — 99232 PR SUBSEQUENT HOSPITAL CARE,LEVL II: ICD-10-PCS | Mod: GT,,, | Performed by: INTERNAL MEDICINE

## 2021-08-01 PROCEDURE — 11000001 HC ACUTE MED/SURG PRIVATE ROOM

## 2021-08-01 PROCEDURE — 99900035 HC TECH TIME PER 15 MIN (STAT)

## 2021-08-01 PROCEDURE — 27000221 HC OXYGEN, UP TO 24 HOURS

## 2021-08-01 PROCEDURE — 25000003 PHARM REV CODE 250: Performed by: FAMILY MEDICINE

## 2021-08-01 PROCEDURE — 80053 COMPREHEN METABOLIC PANEL: CPT | Performed by: INTERNAL MEDICINE

## 2021-08-01 PROCEDURE — 63600175 PHARM REV CODE 636 W HCPCS: Performed by: INTERNAL MEDICINE

## 2021-08-01 PROCEDURE — 85025 COMPLETE CBC W/AUTO DIFF WBC: CPT | Performed by: INTERNAL MEDICINE

## 2021-08-01 RX ADMIN — SODIUM BICARBONATE 650 MG TABLET 650 MG: at 08:08

## 2021-08-01 RX ADMIN — ALBUTEROL SULFATE 2.5 MG: 2.5 SOLUTION RESPIRATORY (INHALATION) at 09:08

## 2021-08-01 RX ADMIN — SODIUM BICARBONATE 650 MG TABLET 650 MG: at 10:08

## 2021-08-01 RX ADMIN — HYDRALAZINE HYDROCHLORIDE 100 MG: 50 TABLET ORAL at 02:08

## 2021-08-01 RX ADMIN — ALBUTEROL SULFATE 2.5 MG: 2.5 SOLUTION RESPIRATORY (INHALATION) at 02:08

## 2021-08-01 RX ADMIN — NIFEDIPINE 60 MG: 60 TABLET, FILM COATED, EXTENDED RELEASE ORAL at 08:08

## 2021-08-01 RX ADMIN — HEPARIN SODIUM 5000 UNITS: 5000 INJECTION INTRAVENOUS; SUBCUTANEOUS at 10:08

## 2021-08-01 RX ADMIN — GUAIFENESIN 600 MG: 600 TABLET, EXTENDED RELEASE ORAL at 12:08

## 2021-08-01 RX ADMIN — SEVELAMER CARBONATE 800 MG: 800 TABLET, FILM COATED ORAL at 05:08

## 2021-08-01 RX ADMIN — HEPARIN SODIUM 5000 UNITS: 5000 INJECTION INTRAVENOUS; SUBCUTANEOUS at 02:08

## 2021-08-01 RX ADMIN — SEVELAMER CARBONATE 800 MG: 800 TABLET, FILM COATED ORAL at 12:08

## 2021-08-01 RX ADMIN — SEVELAMER CARBONATE 800 MG: 800 TABLET, FILM COATED ORAL at 08:08

## 2021-08-01 RX ADMIN — HYDRALAZINE HYDROCHLORIDE 100 MG: 50 TABLET ORAL at 08:08

## 2021-08-01 RX ADMIN — FUROSEMIDE 40 MG: 40 TABLET ORAL at 08:08

## 2021-08-01 RX ADMIN — MUPIROCIN: 20 OINTMENT TOPICAL at 08:08

## 2021-08-01 RX ADMIN — CARVEDILOL 25 MG: 25 TABLET, FILM COATED ORAL at 10:08

## 2021-08-01 RX ADMIN — GUAIFENESIN 600 MG: 600 TABLET, EXTENDED RELEASE ORAL at 07:08

## 2021-08-01 RX ADMIN — BENZONATATE 100 MG: 100 CAPSULE, LIQUID FILLED ORAL at 10:08

## 2021-08-01 RX ADMIN — ATORVASTATIN CALCIUM 40 MG: 20 TABLET, FILM COATED ORAL at 08:08

## 2021-08-01 RX ADMIN — BENZONATATE 100 MG: 100 CAPSULE, LIQUID FILLED ORAL at 08:08

## 2021-08-01 RX ADMIN — HEPARIN SODIUM 5000 UNITS: 5000 INJECTION INTRAVENOUS; SUBCUTANEOUS at 05:08

## 2021-08-01 RX ADMIN — ALBUTEROL SULFATE 2.5 MG: 2.5 SOLUTION RESPIRATORY (INHALATION) at 07:08

## 2021-08-01 RX ADMIN — HYDRALAZINE HYDROCHLORIDE 100 MG: 50 TABLET ORAL at 10:08

## 2021-08-01 RX ADMIN — CARVEDILOL 25 MG: 25 TABLET, FILM COATED ORAL at 08:08

## 2021-08-01 RX ADMIN — SODIUM BICARBONATE 650 MG TABLET 650 MG: at 02:08

## 2021-08-02 VITALS
HEART RATE: 84 BPM | BODY MASS INDEX: 37.28 KG/M2 | HEIGHT: 73 IN | TEMPERATURE: 98 F | OXYGEN SATURATION: 96 % | RESPIRATION RATE: 18 BRPM | WEIGHT: 281.31 LBS | SYSTOLIC BLOOD PRESSURE: 158 MMHG | DIASTOLIC BLOOD PRESSURE: 79 MMHG

## 2021-08-02 LAB
ALBUMIN SERPL BCP-MCNC: 2.1 G/DL (ref 3.5–5.2)
ALP SERPL-CCNC: 343 U/L (ref 55–135)
ALT SERPL W/O P-5'-P-CCNC: 13 U/L (ref 10–44)
ANION GAP SERPL CALC-SCNC: 11 MMOL/L (ref 8–16)
AST SERPL-CCNC: 24 U/L (ref 10–40)
BASOPHILS # BLD AUTO: 0.02 K/UL (ref 0–0.2)
BASOPHILS NFR BLD: 0.3 % (ref 0–1.9)
BILIRUB SERPL-MCNC: 0.4 MG/DL (ref 0.1–1)
BUN SERPL-MCNC: 74 MG/DL (ref 6–20)
CALCIUM SERPL-MCNC: 8.1 MG/DL (ref 8.7–10.5)
CHLORIDE SERPL-SCNC: 97 MMOL/L (ref 95–110)
CO2 SERPL-SCNC: 23 MMOL/L (ref 23–29)
CREAT SERPL-MCNC: 6.3 MG/DL (ref 0.5–1.4)
DIFFERENTIAL METHOD: ABNORMAL
EOSINOPHIL # BLD AUTO: 0.3 K/UL (ref 0–0.5)
EOSINOPHIL NFR BLD: 4.5 % (ref 0–8)
ERYTHROCYTE [DISTWIDTH] IN BLOOD BY AUTOMATED COUNT: 13.5 % (ref 11.5–14.5)
EST. GFR  (AFRICAN AMERICAN): 10.4 ML/MIN/1.73 M^2
EST. GFR  (NON AFRICAN AMERICAN): 9 ML/MIN/1.73 M^2
GLUCOSE SERPL-MCNC: 90 MG/DL (ref 70–110)
HCT VFR BLD AUTO: 23.6 % (ref 40–54)
HGB BLD-MCNC: 7.8 G/DL (ref 14–18)
IMM GRANULOCYTES # BLD AUTO: 0.08 K/UL (ref 0–0.04)
IMM GRANULOCYTES NFR BLD AUTO: 1.4 % (ref 0–0.5)
LYMPHOCYTES # BLD AUTO: 1 K/UL (ref 1–4.8)
LYMPHOCYTES NFR BLD: 16.6 % (ref 18–48)
MAGNESIUM SERPL-MCNC: 1.7 MG/DL (ref 1.6–2.6)
MCH RBC QN AUTO: 27.4 PG (ref 27–31)
MCHC RBC AUTO-ENTMCNC: 33.1 G/DL (ref 32–36)
MCV RBC AUTO: 83 FL (ref 82–98)
MONOCYTES # BLD AUTO: 0.7 K/UL (ref 0.3–1)
MONOCYTES NFR BLD: 12.3 % (ref 4–15)
NEUTROPHILS # BLD AUTO: 3.8 K/UL (ref 1.8–7.7)
NEUTROPHILS NFR BLD: 64.9 % (ref 38–73)
NRBC BLD-RTO: 0 /100 WBC
PHOSPHATE SERPL-MCNC: 5 MG/DL (ref 2.7–4.5)
PLATELET # BLD AUTO: 197 K/UL (ref 150–450)
PMV BLD AUTO: 10.3 FL (ref 9.2–12.9)
POCT GLUCOSE: 116 MG/DL (ref 70–110)
POCT GLUCOSE: 88 MG/DL (ref 70–110)
POCT GLUCOSE: 92 MG/DL (ref 70–110)
POCT GLUCOSE: 93 MG/DL (ref 70–110)
POTASSIUM SERPL-SCNC: 4.1 MMOL/L (ref 3.5–5.1)
PROT SERPL-MCNC: 6.3 G/DL (ref 6–8.4)
RBC # BLD AUTO: 2.85 M/UL (ref 4.6–6.2)
SODIUM SERPL-SCNC: 131 MMOL/L (ref 136–145)
WBC # BLD AUTO: 5.79 K/UL (ref 3.9–12.7)

## 2021-08-02 PROCEDURE — 99239 PR HOSPITAL DISCHARGE DAY,>30 MIN: ICD-10-PCS | Mod: GT,,, | Performed by: INTERNAL MEDICINE

## 2021-08-02 PROCEDURE — 84100 ASSAY OF PHOSPHORUS: CPT | Performed by: INTERNAL MEDICINE

## 2021-08-02 PROCEDURE — 25000003 PHARM REV CODE 250: Performed by: INTERNAL MEDICINE

## 2021-08-02 PROCEDURE — 25000003 PHARM REV CODE 250: Performed by: PHYSICIAN ASSISTANT

## 2021-08-02 PROCEDURE — 25000242 PHARM REV CODE 250 ALT 637 W/ HCPCS: Performed by: INTERNAL MEDICINE

## 2021-08-02 PROCEDURE — 27000646 HC AEROBIKA DEVICE

## 2021-08-02 PROCEDURE — 90935 HEMODIALYSIS ONE EVALUATION: CPT | Mod: ,,, | Performed by: INTERNAL MEDICINE

## 2021-08-02 PROCEDURE — 25000003 PHARM REV CODE 250: Performed by: STUDENT IN AN ORGANIZED HEALTH CARE EDUCATION/TRAINING PROGRAM

## 2021-08-02 PROCEDURE — 63600175 PHARM REV CODE 636 W HCPCS: Performed by: STUDENT IN AN ORGANIZED HEALTH CARE EDUCATION/TRAINING PROGRAM

## 2021-08-02 PROCEDURE — 94640 AIRWAY INHALATION TREATMENT: CPT

## 2021-08-02 PROCEDURE — 80053 COMPREHEN METABOLIC PANEL: CPT | Performed by: INTERNAL MEDICINE

## 2021-08-02 PROCEDURE — 99900035 HC TECH TIME PER 15 MIN (STAT)

## 2021-08-02 PROCEDURE — 90935 HEMODIALYSIS ONE EVALUATION: CPT

## 2021-08-02 PROCEDURE — 94664 DEMO&/EVAL PT USE INHALER: CPT

## 2021-08-02 PROCEDURE — 83735 ASSAY OF MAGNESIUM: CPT | Performed by: INTERNAL MEDICINE

## 2021-08-02 PROCEDURE — 36415 COLL VENOUS BLD VENIPUNCTURE: CPT | Performed by: INTERNAL MEDICINE

## 2021-08-02 PROCEDURE — 63600175 PHARM REV CODE 636 W HCPCS: Performed by: INTERNAL MEDICINE

## 2021-08-02 PROCEDURE — 99239 HOSP IP/OBS DSCHRG MGMT >30: CPT | Mod: GT,,, | Performed by: INTERNAL MEDICINE

## 2021-08-02 PROCEDURE — 90935 PR HEMODIALYSIS, ONE EVALUATION: ICD-10-PCS | Mod: ,,, | Performed by: INTERNAL MEDICINE

## 2021-08-02 PROCEDURE — 94761 N-INVAS EAR/PLS OXIMETRY MLT: CPT

## 2021-08-02 PROCEDURE — 85025 COMPLETE CBC W/AUTO DIFF WBC: CPT | Performed by: INTERNAL MEDICINE

## 2021-08-02 RX ORDER — GUAIFENESIN 600 MG/1
600 TABLET, EXTENDED RELEASE ORAL 2 TIMES DAILY
Qty: 20 TABLET | Refills: 0 | COMMUNITY
Start: 2021-08-02 | End: 2021-08-12

## 2021-08-02 RX ORDER — NIFEDIPINE 30 MG/1
30 TABLET, EXTENDED RELEASE ORAL ONCE
Status: COMPLETED | OUTPATIENT
Start: 2021-08-02 | End: 2021-08-02

## 2021-08-02 RX ORDER — NIFEDIPINE 90 MG/1
90 TABLET, FILM COATED, EXTENDED RELEASE ORAL DAILY
Qty: 30 TABLET | Refills: 5 | Status: SHIPPED | OUTPATIENT
Start: 2021-08-02 | End: 2021-09-16

## 2021-08-02 RX ORDER — ALBUTEROL SULFATE 90 UG/1
2 AEROSOL, METERED RESPIRATORY (INHALATION) EVERY 6 HOURS PRN
Qty: 18 G | Refills: 1 | Status: SHIPPED | OUTPATIENT
Start: 2021-08-02 | End: 2022-02-23 | Stop reason: ALTCHOICE

## 2021-08-02 RX ORDER — TORSEMIDE 20 MG/1
20 TABLET ORAL DAILY
Qty: 30 TABLET | Refills: 5 | Status: SHIPPED | OUTPATIENT
Start: 2021-08-02 | End: 2022-06-17 | Stop reason: SDUPTHER

## 2021-08-02 RX ORDER — HEPARIN SODIUM 1000 [USP'U]/ML
1000 INJECTION, SOLUTION INTRAVENOUS; SUBCUTANEOUS
Status: DISCONTINUED | OUTPATIENT
Start: 2021-08-02 | End: 2021-08-02 | Stop reason: HOSPADM

## 2021-08-02 RX ORDER — LEVOFLOXACIN 500 MG/1
500 TABLET, FILM COATED ORAL EVERY OTHER DAY
Qty: 2 TABLET | Refills: 0 | Status: SHIPPED | OUTPATIENT
Start: 2021-08-04 | End: 2021-08-07

## 2021-08-02 RX ORDER — LABETALOL HCL 20 MG/4 ML
10 SYRINGE (ML) INTRAVENOUS ONCE
Status: COMPLETED | OUTPATIENT
Start: 2021-08-02 | End: 2021-08-02

## 2021-08-02 RX ORDER — LEVOFLOXACIN 500 MG/1
500 TABLET, FILM COATED ORAL EVERY OTHER DAY
Status: DISCONTINUED | OUTPATIENT
Start: 2021-08-04 | End: 2021-08-02 | Stop reason: HOSPADM

## 2021-08-02 RX ORDER — SEVELAMER CARBONATE 800 MG/1
800 TABLET, FILM COATED ORAL
Qty: 90 TABLET | Refills: 11 | Status: SHIPPED | OUTPATIENT
Start: 2021-08-02 | End: 2021-11-15 | Stop reason: SDUPTHER

## 2021-08-02 RX ORDER — SODIUM CHLORIDE 9 MG/ML
INJECTION, SOLUTION INTRAVENOUS ONCE
Status: COMPLETED | OUTPATIENT
Start: 2021-08-02 | End: 2021-08-02

## 2021-08-02 RX ORDER — BENZONATATE 100 MG/1
100 CAPSULE ORAL 3 TIMES DAILY PRN
Qty: 20 CAPSULE | Refills: 1 | Status: SHIPPED | OUTPATIENT
Start: 2021-08-02 | End: 2021-08-12

## 2021-08-02 RX ADMIN — SODIUM BICARBONATE 650 MG TABLET 650 MG: at 03:08

## 2021-08-02 RX ADMIN — MELATONIN TAB 3 MG 6 MG: 3 TAB at 12:08

## 2021-08-02 RX ADMIN — ALBUTEROL SULFATE 2.5 MG: 2.5 SOLUTION RESPIRATORY (INHALATION) at 05:08

## 2021-08-02 RX ADMIN — FUROSEMIDE 40 MG: 40 TABLET ORAL at 08:08

## 2021-08-02 RX ADMIN — SODIUM BICARBONATE 650 MG TABLET 650 MG: at 08:08

## 2021-08-02 RX ADMIN — LEVOFLOXACIN 750 MG: 500 TABLET, FILM COATED ORAL at 02:08

## 2021-08-02 RX ADMIN — SEVELAMER CARBONATE 800 MG: 800 TABLET, FILM COATED ORAL at 08:08

## 2021-08-02 RX ADMIN — ATORVASTATIN CALCIUM 40 MG: 20 TABLET, FILM COATED ORAL at 08:08

## 2021-08-02 RX ADMIN — HEPARIN SODIUM 1000 UNITS: 1000 INJECTION, SOLUTION INTRAVENOUS; SUBCUTANEOUS at 11:08

## 2021-08-02 RX ADMIN — HYDRALAZINE HYDROCHLORIDE 100 MG: 50 TABLET ORAL at 03:08

## 2021-08-02 RX ADMIN — SEVELAMER CARBONATE 800 MG: 800 TABLET, FILM COATED ORAL at 12:08

## 2021-08-02 RX ADMIN — SODIUM CHLORIDE: 0.9 INJECTION, SOLUTION INTRAVENOUS at 09:08

## 2021-08-02 RX ADMIN — HYDRALAZINE HYDROCHLORIDE 100 MG: 50 TABLET ORAL at 08:08

## 2021-08-02 RX ADMIN — LABETALOL HYDROCHLORIDE 10 MG: 5 INJECTION, SOLUTION INTRAVENOUS at 03:08

## 2021-08-02 RX ADMIN — CARVEDILOL 25 MG: 25 TABLET, FILM COATED ORAL at 08:08

## 2021-08-02 RX ADMIN — NIFEDIPINE 60 MG: 60 TABLET, FILM COATED, EXTENDED RELEASE ORAL at 08:08

## 2021-08-02 RX ADMIN — HEPARIN SODIUM 5000 UNITS: 5000 INJECTION INTRAVENOUS; SUBCUTANEOUS at 05:08

## 2021-08-02 RX ADMIN — GUAIFENESIN 600 MG: 600 TABLET, EXTENDED RELEASE ORAL at 12:08

## 2021-08-02 RX ADMIN — HEPARIN SODIUM 5000 UNITS: 5000 INJECTION INTRAVENOUS; SUBCUTANEOUS at 02:08

## 2021-08-02 RX ADMIN — NIFEDIPINE 30 MG: 30 TABLET, FILM COATED, EXTENDED RELEASE ORAL at 12:08

## 2021-08-03 ENCOUNTER — PATIENT OUTREACH (OUTPATIENT)
Dept: ADMINISTRATIVE | Facility: CLINIC | Age: 56
End: 2021-08-03

## 2021-08-06 ENCOUNTER — HOSPITAL ENCOUNTER (OUTPATIENT)
Dept: WOUND CARE | Facility: HOSPITAL | Age: 56
Discharge: HOME | End: 2021-08-06
Attending: PODIATRIST
Payer: COMMERCIAL

## 2021-08-06 VITALS
BODY MASS INDEX: 37.11 KG/M2 | HEART RATE: 85 BPM | DIASTOLIC BLOOD PRESSURE: 72 MMHG | SYSTOLIC BLOOD PRESSURE: 142 MMHG | OXYGEN SATURATION: 94 % | TEMPERATURE: 98 F | HEIGHT: 73 IN

## 2021-08-06 DIAGNOSIS — E11.621 DIABETIC ULCER OF LEFT FOOT ASSOCIATED WITH TYPE 2 DIABETES MELLITUS, WITH BONE INVOLVEMENT WITHOUT EVIDENCE OF NECROSIS: Primary | ICD-10-CM

## 2021-08-06 DIAGNOSIS — L97.526 DIABETIC ULCER OF LEFT FOOT ASSOCIATED WITH TYPE 2 DIABETES MELLITUS, WITH BONE INVOLVEMENT WITHOUT EVIDENCE OF NECROSIS: Primary | ICD-10-CM

## 2021-08-06 DIAGNOSIS — M86.9 OSTEOMYELITIS OF LEFT FOOT: ICD-10-CM

## 2021-08-06 PROCEDURE — 99213 OFFICE O/P EST LOW 20 MIN: CPT | Mod: ,,, | Performed by: PODIATRIST

## 2021-08-06 PROCEDURE — 99213 OFFICE O/P EST LOW 20 MIN: CPT | Performed by: PODIATRIST

## 2021-08-06 PROCEDURE — 99213 PR OFFICE/OUTPT VISIT, EST, LEVL III, 20-29 MIN: ICD-10-PCS | Mod: ,,, | Performed by: PODIATRIST

## 2021-08-06 PROCEDURE — 29581 APPL MULTLAYER CMPRN SYS LEG: CPT

## 2021-08-11 ENCOUNTER — HOSPITAL ENCOUNTER (OUTPATIENT)
Dept: WOUND CARE | Facility: HOSPITAL | Age: 56
Discharge: HOME | End: 2021-08-11
Attending: PODIATRIST
Payer: COMMERCIAL

## 2021-08-11 VITALS
HEART RATE: 83 BPM | DIASTOLIC BLOOD PRESSURE: 64 MMHG | TEMPERATURE: 98 F | RESPIRATION RATE: 20 BRPM | SYSTOLIC BLOOD PRESSURE: 131 MMHG

## 2021-08-11 DIAGNOSIS — L97.526 DIABETIC ULCER OF LEFT FOOT ASSOCIATED WITH TYPE 2 DIABETES MELLITUS, WITH BONE INVOLVEMENT WITHOUT EVIDENCE OF NECROSIS: Primary | ICD-10-CM

## 2021-08-11 DIAGNOSIS — E11.621 DIABETIC ULCER OF LEFT FOOT ASSOCIATED WITH TYPE 2 DIABETES MELLITUS, WITH BONE INVOLVEMENT WITHOUT EVIDENCE OF NECROSIS: Primary | ICD-10-CM

## 2021-08-11 PROCEDURE — 29581 APPL MULTLAYER CMPRN SYS LEG: CPT

## 2021-08-13 ENCOUNTER — HOSPITAL ENCOUNTER (OUTPATIENT)
Dept: WOUND CARE | Facility: HOSPITAL | Age: 56
Discharge: HOME | End: 2021-08-13
Attending: PODIATRIST
Payer: COMMERCIAL

## 2021-08-13 VITALS — HEART RATE: 85 BPM | TEMPERATURE: 97 F | SYSTOLIC BLOOD PRESSURE: 133 MMHG | DIASTOLIC BLOOD PRESSURE: 71 MMHG

## 2021-08-13 DIAGNOSIS — N18.5 TYPE 2 DIABETES MELLITUS WITH STAGE 5 CHRONIC KIDNEY DISEASE NOT ON CHRONIC DIALYSIS, WITH LONG-TERM CURRENT USE OF INSULIN: ICD-10-CM

## 2021-08-13 DIAGNOSIS — E11.49 TYPE II DIABETES MELLITUS WITH NEUROLOGICAL MANIFESTATIONS: ICD-10-CM

## 2021-08-13 DIAGNOSIS — E11.621 DIABETIC ULCER OF LEFT MIDFOOT ASSOCIATED WITH TYPE 2 DIABETES MELLITUS, WITH BONE INVOLVEMENT WITHOUT EVIDENCE OF NECROSIS: ICD-10-CM

## 2021-08-13 DIAGNOSIS — Z79.4 TYPE 2 DIABETES MELLITUS WITH STAGE 5 CHRONIC KIDNEY DISEASE NOT ON CHRONIC DIALYSIS, WITH LONG-TERM CURRENT USE OF INSULIN: ICD-10-CM

## 2021-08-13 DIAGNOSIS — L97.426 DIABETIC ULCER OF LEFT MIDFOOT ASSOCIATED WITH TYPE 2 DIABETES MELLITUS, WITH BONE INVOLVEMENT WITHOUT EVIDENCE OF NECROSIS: ICD-10-CM

## 2021-08-13 DIAGNOSIS — E11.22 TYPE 2 DIABETES MELLITUS WITH STAGE 5 CHRONIC KIDNEY DISEASE NOT ON CHRONIC DIALYSIS, WITH LONG-TERM CURRENT USE OF INSULIN: ICD-10-CM

## 2021-08-13 DIAGNOSIS — L97.526 DIABETIC ULCER OF LEFT FOOT ASSOCIATED WITH TYPE 2 DIABETES MELLITUS, WITH BONE INVOLVEMENT WITHOUT EVIDENCE OF NECROSIS: Primary | ICD-10-CM

## 2021-08-13 DIAGNOSIS — E11.621 DIABETIC ULCER OF LEFT FOOT ASSOCIATED WITH TYPE 2 DIABETES MELLITUS, WITH BONE INVOLVEMENT WITHOUT EVIDENCE OF NECROSIS: Primary | ICD-10-CM

## 2021-08-13 PROCEDURE — 15275 PR SKIN SUB GRAFT FACE/NK/HF/G UP TO 100 SQCM: ICD-10-PCS | Mod: ,,, | Performed by: PODIATRIST

## 2021-08-13 PROCEDURE — 99499 NO LOS: ICD-10-PCS | Mod: ,,, | Performed by: PODIATRIST

## 2021-08-13 PROCEDURE — 15275 SKIN SUB GRAFT FACE/NK/HF/G: CPT | Mod: ,,, | Performed by: PODIATRIST

## 2021-08-13 PROCEDURE — 15275 SKIN SUB GRAFT FACE/NK/HF/G: CPT | Performed by: PODIATRIST

## 2021-08-13 PROCEDURE — 99499 UNLISTED E&M SERVICE: CPT | Mod: ,,, | Performed by: PODIATRIST

## 2021-08-16 ENCOUNTER — OFFICE VISIT (OUTPATIENT)
Dept: FAMILY MEDICINE | Facility: CLINIC | Age: 56
End: 2021-08-16
Payer: COMMERCIAL

## 2021-08-16 VITALS
TEMPERATURE: 99 F | HEIGHT: 73 IN | OXYGEN SATURATION: 97 % | WEIGHT: 273.56 LBS | SYSTOLIC BLOOD PRESSURE: 138 MMHG | HEART RATE: 92 BPM | DIASTOLIC BLOOD PRESSURE: 70 MMHG | RESPIRATION RATE: 18 BRPM | BODY MASS INDEX: 36.25 KG/M2

## 2021-08-16 DIAGNOSIS — Z12.11 COLON CANCER SCREENING: ICD-10-CM

## 2021-08-16 DIAGNOSIS — Z79.4 TYPE 2 DIABETES MELLITUS WITH STAGE 5 CHRONIC KIDNEY DISEASE NOT ON CHRONIC DIALYSIS, WITH LONG-TERM CURRENT USE OF INSULIN: ICD-10-CM

## 2021-08-16 DIAGNOSIS — Z23 NEED FOR PNEUMOCOCCAL VACCINATION: ICD-10-CM

## 2021-08-16 DIAGNOSIS — M86.9 OSTEOMYELITIS OF LEFT FOOT, UNSPECIFIED TYPE: ICD-10-CM

## 2021-08-16 DIAGNOSIS — I10 ESSENTIAL HYPERTENSION: Chronic | ICD-10-CM

## 2021-08-16 DIAGNOSIS — Z99.2 ESRD ON HEMODIALYSIS: Primary | ICD-10-CM

## 2021-08-16 DIAGNOSIS — N18.6 ESRD ON HEMODIALYSIS: Primary | ICD-10-CM

## 2021-08-16 DIAGNOSIS — E11.22 TYPE 2 DIABETES MELLITUS WITH STAGE 5 CHRONIC KIDNEY DISEASE NOT ON CHRONIC DIALYSIS, WITH LONG-TERM CURRENT USE OF INSULIN: ICD-10-CM

## 2021-08-16 DIAGNOSIS — N18.5 TYPE 2 DIABETES MELLITUS WITH STAGE 5 CHRONIC KIDNEY DISEASE NOT ON CHRONIC DIALYSIS, WITH LONG-TERM CURRENT USE OF INSULIN: ICD-10-CM

## 2021-08-16 PROBLEM — U07.1 COVID-19 VIRUS INFECTION: Status: RESOLVED | Noted: 2020-07-16 | Resolved: 2021-08-16

## 2021-08-16 PROBLEM — J96.01 ACUTE HYPOXEMIC RESPIRATORY FAILURE: Status: RESOLVED | Noted: 2020-08-03 | Resolved: 2021-08-16

## 2021-08-16 PROBLEM — J81.0 ACUTE PULMONARY EDEMA: Status: RESOLVED | Noted: 2021-07-29 | Resolved: 2021-08-16

## 2021-08-16 PROBLEM — R79.89 ELEVATED TROPONIN: Status: RESOLVED | Noted: 2020-08-03 | Resolved: 2021-08-16

## 2021-08-16 PROBLEM — R09.02 HYPOXIA: Status: RESOLVED | Noted: 2021-07-27 | Resolved: 2021-08-16

## 2021-08-16 PROCEDURE — 90670 PCV13 VACCINE IM: CPT | Mod: S$GLB,,, | Performed by: PHYSICIAN ASSISTANT

## 2021-08-16 PROCEDURE — 90471 IMMUNIZATION ADMIN: CPT | Mod: S$GLB,,, | Performed by: PHYSICIAN ASSISTANT

## 2021-08-16 PROCEDURE — 3008F PR BODY MASS INDEX (BMI) DOCUMENTED: ICD-10-PCS | Mod: CPTII,S$GLB,, | Performed by: PHYSICIAN ASSISTANT

## 2021-08-16 PROCEDURE — 1111F DSCHRG MED/CURRENT MED MERGE: CPT | Mod: CPTII,S$GLB,, | Performed by: PHYSICIAN ASSISTANT

## 2021-08-16 PROCEDURE — 3075F PR MOST RECENT SYSTOLIC BLOOD PRESS GE 130-139MM HG: ICD-10-PCS | Mod: CPTII,S$GLB,, | Performed by: PHYSICIAN ASSISTANT

## 2021-08-16 PROCEDURE — 1111F PR DISCHARGE MEDS RECONCILED W/ CURRENT OUTPATIENT MED LIST: ICD-10-PCS | Mod: CPTII,S$GLB,, | Performed by: PHYSICIAN ASSISTANT

## 2021-08-16 PROCEDURE — 3078F PR MOST RECENT DIASTOLIC BLOOD PRESSURE < 80 MM HG: ICD-10-PCS | Mod: CPTII,S$GLB,, | Performed by: PHYSICIAN ASSISTANT

## 2021-08-16 PROCEDURE — 99999 PR PBB SHADOW E&M-EST. PATIENT-LVL IV: CPT | Mod: PBBFAC,,, | Performed by: PHYSICIAN ASSISTANT

## 2021-08-16 PROCEDURE — 99999 PR PBB SHADOW E&M-EST. PATIENT-LVL IV: ICD-10-PCS | Mod: PBBFAC,,, | Performed by: PHYSICIAN ASSISTANT

## 2021-08-16 PROCEDURE — 3008F BODY MASS INDEX DOCD: CPT | Mod: CPTII,S$GLB,, | Performed by: PHYSICIAN ASSISTANT

## 2021-08-16 PROCEDURE — 90670 PNEUMOCOCCAL CONJUGATE VACCINE 13-VALENT LESS THAN 5YO & GREATER THAN: ICD-10-PCS | Mod: S$GLB,,, | Performed by: PHYSICIAN ASSISTANT

## 2021-08-16 PROCEDURE — 1126F PR PAIN SEVERITY QUANTIFIED, NO PAIN PRESENT: ICD-10-PCS | Mod: CPTII,S$GLB,, | Performed by: PHYSICIAN ASSISTANT

## 2021-08-16 PROCEDURE — 1126F AMNT PAIN NOTED NONE PRSNT: CPT | Mod: CPTII,S$GLB,, | Performed by: PHYSICIAN ASSISTANT

## 2021-08-16 PROCEDURE — 99214 PR OFFICE/OUTPT VISIT, EST, LEVL IV, 30-39 MIN: ICD-10-PCS | Mod: 25,S$GLB,, | Performed by: PHYSICIAN ASSISTANT

## 2021-08-16 PROCEDURE — 90471 PNEUMOCOCCAL CONJUGATE VACCINE 13-VALENT LESS THAN 5YO & GREATER THAN: ICD-10-PCS | Mod: S$GLB,,, | Performed by: PHYSICIAN ASSISTANT

## 2021-08-16 PROCEDURE — 3044F HG A1C LEVEL LT 7.0%: CPT | Mod: CPTII,S$GLB,, | Performed by: PHYSICIAN ASSISTANT

## 2021-08-16 PROCEDURE — 3075F SYST BP GE 130 - 139MM HG: CPT | Mod: CPTII,S$GLB,, | Performed by: PHYSICIAN ASSISTANT

## 2021-08-16 PROCEDURE — 3044F PR MOST RECENT HEMOGLOBIN A1C LEVEL <7.0%: ICD-10-PCS | Mod: CPTII,S$GLB,, | Performed by: PHYSICIAN ASSISTANT

## 2021-08-16 PROCEDURE — 3078F DIAST BP <80 MM HG: CPT | Mod: CPTII,S$GLB,, | Performed by: PHYSICIAN ASSISTANT

## 2021-08-16 PROCEDURE — 99214 OFFICE O/P EST MOD 30 MIN: CPT | Mod: 25,S$GLB,, | Performed by: PHYSICIAN ASSISTANT

## 2021-08-16 RX ORDER — ERGOCALCIFEROL 1.25 MG/1
50000 CAPSULE ORAL DAILY
COMMUNITY
Start: 2021-08-05 | End: 2021-11-15

## 2021-08-16 RX ORDER — CARVEDILOL 25 MG/1
25 TABLET ORAL 2 TIMES DAILY
Qty: 180 TABLET | Refills: 3 | Status: SHIPPED | OUTPATIENT
Start: 2021-08-16 | End: 2021-08-16 | Stop reason: SDUPTHER

## 2021-08-16 RX ORDER — BENZONATATE 100 MG/1
CAPSULE ORAL
COMMUNITY
Start: 2021-08-05 | End: 2021-11-15

## 2021-08-16 RX ORDER — CARVEDILOL 25 MG/1
25 TABLET ORAL 2 TIMES DAILY
Qty: 180 TABLET | Refills: 3 | Status: SHIPPED | OUTPATIENT
Start: 2021-08-16 | End: 2022-06-17

## 2021-08-18 ENCOUNTER — HOSPITAL ENCOUNTER (OUTPATIENT)
Dept: WOUND CARE | Facility: HOSPITAL | Age: 56
Discharge: HOME | End: 2021-08-18
Attending: FAMILY MEDICINE
Payer: COMMERCIAL

## 2021-08-18 VITALS — SYSTOLIC BLOOD PRESSURE: 179 MMHG | HEART RATE: 78 BPM | DIASTOLIC BLOOD PRESSURE: 86 MMHG | TEMPERATURE: 98 F

## 2021-08-18 DIAGNOSIS — E11.621 DIABETIC ULCER OF LEFT MIDFOOT ASSOCIATED WITH TYPE 2 DIABETES MELLITUS, WITH BONE INVOLVEMENT WITHOUT EVIDENCE OF NECROSIS: ICD-10-CM

## 2021-08-18 DIAGNOSIS — M86.9 OSTEOMYELITIS OF LEFT FOOT, UNSPECIFIED TYPE: ICD-10-CM

## 2021-08-18 DIAGNOSIS — L97.426 DIABETIC ULCER OF LEFT MIDFOOT ASSOCIATED WITH TYPE 2 DIABETES MELLITUS, WITH BONE INVOLVEMENT WITHOUT EVIDENCE OF NECROSIS: ICD-10-CM

## 2021-08-18 DIAGNOSIS — L97.526 DIABETIC ULCER OF LEFT FOOT ASSOCIATED WITH TYPE 2 DIABETES MELLITUS, WITH BONE INVOLVEMENT WITHOUT EVIDENCE OF NECROSIS: Primary | ICD-10-CM

## 2021-08-18 DIAGNOSIS — E11.621 DIABETIC ULCER OF LEFT FOOT ASSOCIATED WITH TYPE 2 DIABETES MELLITUS, WITH BONE INVOLVEMENT WITHOUT EVIDENCE OF NECROSIS: Primary | ICD-10-CM

## 2021-08-18 PROCEDURE — 99214 PR OFFICE/OUTPT VISIT, EST, LEVL IV, 30-39 MIN: ICD-10-PCS | Mod: 25,,, | Performed by: FAMILY MEDICINE

## 2021-08-18 PROCEDURE — 29445 APPL RIGID TOT CNTC LEG CAST: CPT | Mod: ,,, | Performed by: FAMILY MEDICINE

## 2021-08-18 PROCEDURE — 29445 PR APPLY RIGID LEG CAST: ICD-10-PCS | Mod: ,,, | Performed by: FAMILY MEDICINE

## 2021-08-18 PROCEDURE — 99214 OFFICE O/P EST MOD 30 MIN: CPT | Mod: 25,,, | Performed by: FAMILY MEDICINE

## 2021-08-18 PROCEDURE — 29445 APPL RIGID TOT CNTC LEG CAST: CPT | Performed by: FAMILY MEDICINE

## 2021-08-23 ENCOUNTER — HOSPITAL ENCOUNTER (OUTPATIENT)
Dept: WOUND CARE | Facility: HOSPITAL | Age: 56
Discharge: HOME | End: 2021-08-23
Attending: INTERNAL MEDICINE
Payer: COMMERCIAL

## 2021-08-23 VITALS — TEMPERATURE: 98 F | DIASTOLIC BLOOD PRESSURE: 61 MMHG | HEART RATE: 79 BPM | SYSTOLIC BLOOD PRESSURE: 130 MMHG

## 2021-08-23 DIAGNOSIS — L97.526 DIABETIC ULCER OF LEFT FOOT ASSOCIATED WITH TYPE 2 DIABETES MELLITUS, WITH BONE INVOLVEMENT WITHOUT EVIDENCE OF NECROSIS: Primary | ICD-10-CM

## 2021-08-23 DIAGNOSIS — E11.621 DIABETIC ULCER OF LEFT FOOT ASSOCIATED WITH TYPE 2 DIABETES MELLITUS, WITH BONE INVOLVEMENT WITHOUT EVIDENCE OF NECROSIS: Primary | ICD-10-CM

## 2021-08-23 DIAGNOSIS — N18.6 ESRD (END STAGE RENAL DISEASE): ICD-10-CM

## 2021-08-23 PROCEDURE — 29445 APPL RIGID TOT CNTC LEG CAST: CPT | Performed by: INTERNAL MEDICINE

## 2021-08-23 PROCEDURE — 99214 PR OFFICE/OUTPT VISIT, EST, LEVL IV, 30-39 MIN: ICD-10-PCS | Mod: ,,, | Performed by: INTERNAL MEDICINE

## 2021-08-23 PROCEDURE — 99214 OFFICE O/P EST MOD 30 MIN: CPT | Mod: ,,, | Performed by: INTERNAL MEDICINE

## 2021-08-26 DIAGNOSIS — Z79.4 TYPE 2 DIABETES MELLITUS WITH STAGE 5 CHRONIC KIDNEY DISEASE NOT ON CHRONIC DIALYSIS, WITH LONG-TERM CURRENT USE OF INSULIN: ICD-10-CM

## 2021-08-26 DIAGNOSIS — N18.6 END STAGE RENAL DISEASE ON DIALYSIS: Primary | ICD-10-CM

## 2021-08-26 DIAGNOSIS — Z99.2 END STAGE RENAL DISEASE ON DIALYSIS: Primary | ICD-10-CM

## 2021-08-26 DIAGNOSIS — I10 ESSENTIAL HYPERTENSION: ICD-10-CM

## 2021-08-26 DIAGNOSIS — E11.22 TYPE 2 DIABETES MELLITUS WITH STAGE 5 CHRONIC KIDNEY DISEASE NOT ON CHRONIC DIALYSIS, WITH LONG-TERM CURRENT USE OF INSULIN: ICD-10-CM

## 2021-08-26 DIAGNOSIS — Z12.11 SCREEN FOR COLON CANCER: Primary | ICD-10-CM

## 2021-08-26 DIAGNOSIS — N18.5 TYPE 2 DIABETES MELLITUS WITH STAGE 5 CHRONIC KIDNEY DISEASE NOT ON CHRONIC DIALYSIS, WITH LONG-TERM CURRENT USE OF INSULIN: ICD-10-CM

## 2021-08-27 ENCOUNTER — DOCUMENTATION ONLY (OUTPATIENT)
Dept: TRANSPLANT | Facility: CLINIC | Age: 56
End: 2021-08-27

## 2021-08-27 ENCOUNTER — TELEPHONE (OUTPATIENT)
Dept: TRANSPLANT | Facility: CLINIC | Age: 56
End: 2021-08-27

## 2021-08-27 ENCOUNTER — HOSPITAL ENCOUNTER (OUTPATIENT)
Dept: WOUND CARE | Facility: HOSPITAL | Age: 56
Discharge: HOME | End: 2021-08-27
Attending: PODIATRIST
Payer: COMMERCIAL

## 2021-08-27 ENCOUNTER — PATIENT OUTREACH (OUTPATIENT)
Dept: ADMINISTRATIVE | Facility: OTHER | Age: 56
End: 2021-08-27

## 2021-08-27 VITALS — HEART RATE: 78 BPM | SYSTOLIC BLOOD PRESSURE: 136 MMHG | TEMPERATURE: 98 F | DIASTOLIC BLOOD PRESSURE: 72 MMHG

## 2021-08-27 DIAGNOSIS — E11.621 DIABETIC ULCER OF LEFT FOOT ASSOCIATED WITH TYPE 2 DIABETES MELLITUS, WITH BONE INVOLVEMENT WITHOUT EVIDENCE OF NECROSIS: Primary | ICD-10-CM

## 2021-08-27 DIAGNOSIS — L97.526 DIABETIC ULCER OF LEFT FOOT ASSOCIATED WITH TYPE 2 DIABETES MELLITUS, WITH BONE INVOLVEMENT WITHOUT EVIDENCE OF NECROSIS: Primary | ICD-10-CM

## 2021-08-27 PROCEDURE — 29581 APPL MULTLAYER CMPRN SYS LEG: CPT

## 2021-08-29 ENCOUNTER — TELEPHONE (OUTPATIENT)
Dept: WOUND CARE | Facility: HOSPITAL | Age: 56
End: 2021-08-29

## 2021-09-01 ENCOUNTER — TELEPHONE (OUTPATIENT)
Dept: WOUND CARE | Facility: HOSPITAL | Age: 56
End: 2021-09-01

## 2021-09-03 ENCOUNTER — HOSPITAL ENCOUNTER (OUTPATIENT)
Dept: WOUND CARE | Facility: HOSPITAL | Age: 56
Discharge: HOME | End: 2021-09-03
Attending: PODIATRIST
Payer: COMMERCIAL

## 2021-09-03 VITALS — HEART RATE: 79 BPM | TEMPERATURE: 98 F | DIASTOLIC BLOOD PRESSURE: 56 MMHG | SYSTOLIC BLOOD PRESSURE: 120 MMHG

## 2021-09-03 DIAGNOSIS — E11.49 TYPE II DIABETES MELLITUS WITH NEUROLOGICAL MANIFESTATIONS: ICD-10-CM

## 2021-09-03 DIAGNOSIS — E08.621 DIABETIC ULCER OF LEFT MIDFOOT ASSOCIATED WITH DIABETES MELLITUS DUE TO UNDERLYING CONDITION, WITH FAT LAYER EXPOSED: Primary | ICD-10-CM

## 2021-09-03 DIAGNOSIS — L97.422 DIABETIC ULCER OF LEFT MIDFOOT ASSOCIATED WITH DIABETES MELLITUS DUE TO UNDERLYING CONDITION, WITH FAT LAYER EXPOSED: Primary | ICD-10-CM

## 2021-09-03 PROCEDURE — 29581 APPL MULTLAYER CMPRN SYS LEG: CPT

## 2021-09-03 PROCEDURE — 99214 OFFICE O/P EST MOD 30 MIN: CPT | Mod: ,,, | Performed by: PODIATRIST

## 2021-09-03 PROCEDURE — 99213 OFFICE O/P EST LOW 20 MIN: CPT | Performed by: PODIATRIST

## 2021-09-03 PROCEDURE — 99214 PR OFFICE/OUTPT VISIT, EST, LEVL IV, 30-39 MIN: ICD-10-PCS | Mod: ,,, | Performed by: PODIATRIST

## 2021-09-10 ENCOUNTER — HOSPITAL ENCOUNTER (OUTPATIENT)
Dept: WOUND CARE | Facility: HOSPITAL | Age: 56
Discharge: HOME | End: 2021-09-10
Attending: PODIATRIST
Payer: COMMERCIAL

## 2021-09-10 VITALS — HEART RATE: 84 BPM | TEMPERATURE: 98 F | DIASTOLIC BLOOD PRESSURE: 70 MMHG | SYSTOLIC BLOOD PRESSURE: 153 MMHG

## 2021-09-10 DIAGNOSIS — E08.621 DIABETIC ULCER OF LEFT MIDFOOT ASSOCIATED WITH DIABETES MELLITUS DUE TO UNDERLYING CONDITION, WITH FAT LAYER EXPOSED: Primary | ICD-10-CM

## 2021-09-10 DIAGNOSIS — L97.529 DIABETIC ULCER OF TOE OF LEFT FOOT: ICD-10-CM

## 2021-09-10 DIAGNOSIS — L97.422 DIABETIC ULCER OF LEFT MIDFOOT ASSOCIATED WITH DIABETES MELLITUS DUE TO UNDERLYING CONDITION, WITH FAT LAYER EXPOSED: Primary | ICD-10-CM

## 2021-09-10 DIAGNOSIS — E11.621 DIABETIC ULCER OF TOE OF LEFT FOOT: ICD-10-CM

## 2021-09-10 PROCEDURE — 11042 WOUND DEBRIDEMENT: ICD-10-PCS | Mod: ,,, | Performed by: PODIATRIST

## 2021-09-10 PROCEDURE — 11042 DBRDMT SUBQ TIS 1ST 20SQCM/<: CPT | Mod: ,,, | Performed by: PODIATRIST

## 2021-09-10 PROCEDURE — 27201912 HC WOUND CARE DEBRIDEMENT SUPPLIES: Performed by: PODIATRIST

## 2021-09-10 PROCEDURE — 11042 DBRDMT SUBQ TIS 1ST 20SQCM/<: CPT | Performed by: PODIATRIST

## 2021-09-10 PROCEDURE — 99499 NO LOS: ICD-10-PCS | Mod: ,,, | Performed by: PODIATRIST

## 2021-09-10 PROCEDURE — 99499 UNLISTED E&M SERVICE: CPT | Mod: ,,, | Performed by: PODIATRIST

## 2021-09-16 RX ORDER — NIFEDIPINE 60 MG/1
60 TABLET, EXTENDED RELEASE ORAL 2 TIMES DAILY
Qty: 60 TABLET | Refills: 3 | Status: SHIPPED | OUTPATIENT
Start: 2021-09-16 | End: 2021-09-28 | Stop reason: SDUPTHER

## 2021-09-17 ENCOUNTER — HOSPITAL ENCOUNTER (OUTPATIENT)
Dept: WOUND CARE | Facility: HOSPITAL | Age: 56
Discharge: HOME | End: 2021-09-17
Attending: PODIATRIST
Payer: COMMERCIAL

## 2021-09-17 VITALS — TEMPERATURE: 98 F | HEART RATE: 80 BPM | DIASTOLIC BLOOD PRESSURE: 70 MMHG | SYSTOLIC BLOOD PRESSURE: 145 MMHG

## 2021-09-17 DIAGNOSIS — E11.621 DIABETIC ULCER OF LEFT MIDFOOT ASSOCIATED WITH TYPE 2 DIABETES MELLITUS, WITH FAT LAYER EXPOSED: Primary | ICD-10-CM

## 2021-09-17 DIAGNOSIS — L97.422 DIABETIC ULCER OF LEFT MIDFOOT ASSOCIATED WITH TYPE 2 DIABETES MELLITUS, WITH FAT LAYER EXPOSED: Primary | ICD-10-CM

## 2021-09-17 PROCEDURE — 29581 APPL MULTLAYER CMPRN SYS LEG: CPT | Performed by: PODIATRIST

## 2021-09-17 PROCEDURE — 99214 PR OFFICE/OUTPT VISIT, EST, LEVL IV, 30-39 MIN: ICD-10-PCS | Mod: ,,, | Performed by: PODIATRIST

## 2021-09-17 PROCEDURE — 99214 OFFICE O/P EST MOD 30 MIN: CPT | Mod: ,,, | Performed by: PODIATRIST

## 2021-09-24 ENCOUNTER — HOSPITAL ENCOUNTER (OUTPATIENT)
Dept: WOUND CARE | Facility: HOSPITAL | Age: 56
Discharge: HOME | End: 2021-09-24
Attending: PODIATRIST
Payer: COMMERCIAL

## 2021-09-24 VITALS
DIASTOLIC BLOOD PRESSURE: 60 MMHG | TEMPERATURE: 99 F | HEART RATE: 76 BPM | SYSTOLIC BLOOD PRESSURE: 118 MMHG | RESPIRATION RATE: 18 BRPM

## 2021-09-24 DIAGNOSIS — L97.526 DIABETIC ULCER OF LEFT FOOT ASSOCIATED WITH TYPE 2 DIABETES MELLITUS, WITH BONE INVOLVEMENT WITHOUT EVIDENCE OF NECROSIS: Primary | ICD-10-CM

## 2021-09-24 DIAGNOSIS — E11.49 TYPE II DIABETES MELLITUS WITH NEUROLOGICAL MANIFESTATIONS: ICD-10-CM

## 2021-09-24 DIAGNOSIS — E11.621 DIABETIC ULCER OF LEFT FOOT ASSOCIATED WITH TYPE 2 DIABETES MELLITUS, WITH BONE INVOLVEMENT WITHOUT EVIDENCE OF NECROSIS: Primary | ICD-10-CM

## 2021-09-24 DIAGNOSIS — N18.6 ESRD (END STAGE RENAL DISEASE): ICD-10-CM

## 2021-09-24 LAB — NONINV COLON CA DNA+OCC BLD SCRN STL QL: NORMAL

## 2021-09-24 PROCEDURE — 99214 OFFICE O/P EST MOD 30 MIN: CPT | Mod: ,,, | Performed by: PODIATRIST

## 2021-09-24 PROCEDURE — 99213 OFFICE O/P EST LOW 20 MIN: CPT | Performed by: PODIATRIST

## 2021-09-24 PROCEDURE — 99214 PR OFFICE/OUTPT VISIT, EST, LEVL IV, 30-39 MIN: ICD-10-PCS | Mod: ,,, | Performed by: PODIATRIST

## 2021-09-27 ENCOUNTER — TELEPHONE (OUTPATIENT)
Dept: FAMILY MEDICINE | Facility: CLINIC | Age: 56
End: 2021-09-27

## 2021-09-27 DIAGNOSIS — I10 ESSENTIAL HYPERTENSION: Primary | ICD-10-CM

## 2021-09-27 RX ORDER — NIFEDIPINE 90 MG/1
TABLET, FILM COATED, EXTENDED RELEASE ORAL
Qty: 30 TABLET | Refills: 0 | Status: CANCELLED | OUTPATIENT
Start: 2021-09-27

## 2021-09-27 RX ORDER — NIFEDIPINE 90 MG/1
90 TABLET, FILM COATED, EXTENDED RELEASE ORAL DAILY
Qty: 30 TABLET | Refills: 5 | OUTPATIENT
Start: 2021-09-27 | End: 2021-10-27

## 2021-09-28 DIAGNOSIS — Z99.2 CKD (CHRONIC KIDNEY DISEASE) REQUIRING CHRONIC DIALYSIS: ICD-10-CM

## 2021-09-28 DIAGNOSIS — I10 ESSENTIAL HYPERTENSION: Primary | ICD-10-CM

## 2021-09-28 DIAGNOSIS — N18.6 CKD (CHRONIC KIDNEY DISEASE) REQUIRING CHRONIC DIALYSIS: ICD-10-CM

## 2021-09-28 RX ORDER — NIFEDIPINE 60 MG/1
60 TABLET, EXTENDED RELEASE ORAL 2 TIMES DAILY
Qty: 60 TABLET | Refills: 3 | Status: SHIPPED | OUTPATIENT
Start: 2021-09-28 | End: 2021-12-30

## 2021-09-29 DIAGNOSIS — N18.6 CKD (CHRONIC KIDNEY DISEASE) REQUIRING CHRONIC DIALYSIS: ICD-10-CM

## 2021-09-29 DIAGNOSIS — I10 ESSENTIAL HYPERTENSION: ICD-10-CM

## 2021-09-29 DIAGNOSIS — Z99.2 CKD (CHRONIC KIDNEY DISEASE) REQUIRING CHRONIC DIALYSIS: ICD-10-CM

## 2021-10-04 RX ORDER — NIFEDIPINE 60 MG/1
60 TABLET, EXTENDED RELEASE ORAL 2 TIMES DAILY
Qty: 60 TABLET | Refills: 3 | Status: CANCELLED | OUTPATIENT
Start: 2021-10-04 | End: 2021-11-03

## 2021-10-08 ENCOUNTER — HOSPITAL ENCOUNTER (OUTPATIENT)
Dept: WOUND CARE | Facility: HOSPITAL | Age: 56
Discharge: HOME | End: 2021-10-08
Attending: PODIATRIST
Payer: COMMERCIAL

## 2021-10-08 VITALS — TEMPERATURE: 97 F | SYSTOLIC BLOOD PRESSURE: 152 MMHG | DIASTOLIC BLOOD PRESSURE: 70 MMHG | HEART RATE: 82 BPM

## 2021-10-08 DIAGNOSIS — L90.9 PLANTAR FAT PAD ATROPHY: ICD-10-CM

## 2021-10-08 DIAGNOSIS — E11.49 TYPE II DIABETES MELLITUS WITH NEUROLOGICAL MANIFESTATIONS: ICD-10-CM

## 2021-10-08 DIAGNOSIS — N18.6 ESRD (END STAGE RENAL DISEASE): ICD-10-CM

## 2021-10-08 DIAGNOSIS — Z87.2 HEALED ULCER OF LEFT FOOT ON EXAMINATION: Primary | ICD-10-CM

## 2021-10-08 PROCEDURE — 99213 OFFICE O/P EST LOW 20 MIN: CPT | Mod: ,,, | Performed by: PODIATRIST

## 2021-10-08 PROCEDURE — 99212 OFFICE O/P EST SF 10 MIN: CPT | Performed by: PODIATRIST

## 2021-10-08 PROCEDURE — 99213 PR OFFICE/OUTPT VISIT, EST, LEVL III, 20-29 MIN: ICD-10-PCS | Mod: ,,, | Performed by: PODIATRIST

## 2021-10-13 ENCOUNTER — TELEPHONE (OUTPATIENT)
Dept: NEPHROLOGY | Facility: CLINIC | Age: 56
End: 2021-10-13

## 2021-10-26 ENCOUNTER — OFFICE VISIT (OUTPATIENT)
Dept: PODIATRY | Facility: CLINIC | Age: 56
End: 2021-10-26
Payer: COMMERCIAL

## 2021-10-26 ENCOUNTER — PATIENT OUTREACH (OUTPATIENT)
Dept: ADMINISTRATIVE | Facility: OTHER | Age: 56
End: 2021-10-26
Payer: COMMERCIAL

## 2021-10-26 VITALS — HEIGHT: 73 IN | BODY MASS INDEX: 36.25 KG/M2 | WEIGHT: 273.56 LBS

## 2021-10-26 DIAGNOSIS — Z86.31 HISTORY OF DIABETIC ULCER OF FOOT: ICD-10-CM

## 2021-10-26 DIAGNOSIS — M20.41 HAMMER TOES OF BOTH FEET: ICD-10-CM

## 2021-10-26 DIAGNOSIS — M20.5X1 HALLUX LIMITUS, ACQUIRED, RIGHT: ICD-10-CM

## 2021-10-26 DIAGNOSIS — M21.6X1 PLANTARFLEXION DEFORMITY OF RIGHT FOOT: ICD-10-CM

## 2021-10-26 DIAGNOSIS — L90.9 PLANTAR FAT PAD ATROPHY: ICD-10-CM

## 2021-10-26 DIAGNOSIS — E11.49 TYPE II DIABETES MELLITUS WITH NEUROLOGICAL MANIFESTATIONS: Primary | ICD-10-CM

## 2021-10-26 DIAGNOSIS — M20.5X2 HALLUX LIMITUS, ACQUIRED, LEFT: ICD-10-CM

## 2021-10-26 DIAGNOSIS — M20.42 HAMMER TOES OF BOTH FEET: ICD-10-CM

## 2021-10-26 PROCEDURE — 99213 PR OFFICE/OUTPT VISIT, EST, LEVL III, 20-29 MIN: ICD-10-PCS | Mod: S$GLB,,, | Performed by: PODIATRIST

## 2021-10-26 PROCEDURE — 1159F PR MEDICATION LIST DOCUMENTED IN MEDICAL RECORD: ICD-10-PCS | Mod: CPTII,S$GLB,, | Performed by: PODIATRIST

## 2021-10-26 PROCEDURE — 1160F PR REVIEW ALL MEDS BY PRESCRIBER/CLIN PHARMACIST DOCUMENTED: ICD-10-PCS | Mod: CPTII,S$GLB,, | Performed by: PODIATRIST

## 2021-10-26 PROCEDURE — 99213 OFFICE O/P EST LOW 20 MIN: CPT | Mod: S$GLB,,, | Performed by: PODIATRIST

## 2021-10-26 PROCEDURE — 3008F BODY MASS INDEX DOCD: CPT | Mod: CPTII,S$GLB,, | Performed by: PODIATRIST

## 2021-10-26 PROCEDURE — 3066F PR DOCUMENTATION OF TREATMENT FOR NEPHROPATHY: ICD-10-PCS | Mod: CPTII,S$GLB,, | Performed by: PODIATRIST

## 2021-10-26 PROCEDURE — 3044F HG A1C LEVEL LT 7.0%: CPT | Mod: CPTII,S$GLB,, | Performed by: PODIATRIST

## 2021-10-26 PROCEDURE — 1160F RVW MEDS BY RX/DR IN RCRD: CPT | Mod: CPTII,S$GLB,, | Performed by: PODIATRIST

## 2021-10-26 PROCEDURE — 1159F MED LIST DOCD IN RCRD: CPT | Mod: CPTII,S$GLB,, | Performed by: PODIATRIST

## 2021-10-26 PROCEDURE — 3066F NEPHROPATHY DOC TX: CPT | Mod: CPTII,S$GLB,, | Performed by: PODIATRIST

## 2021-10-26 PROCEDURE — 3044F PR MOST RECENT HEMOGLOBIN A1C LEVEL <7.0%: ICD-10-PCS | Mod: CPTII,S$GLB,, | Performed by: PODIATRIST

## 2021-10-26 PROCEDURE — 99999 PR PBB SHADOW E&M-EST. PATIENT-LVL IV: ICD-10-PCS | Mod: PBBFAC,,, | Performed by: PODIATRIST

## 2021-10-26 PROCEDURE — 3008F PR BODY MASS INDEX (BMI) DOCUMENTED: ICD-10-PCS | Mod: CPTII,S$GLB,, | Performed by: PODIATRIST

## 2021-10-26 PROCEDURE — 99999 PR PBB SHADOW E&M-EST. PATIENT-LVL IV: CPT | Mod: PBBFAC,,, | Performed by: PODIATRIST

## 2021-10-28 ENCOUNTER — TELEPHONE (OUTPATIENT)
Dept: NEPHROLOGY | Facility: CLINIC | Age: 56
End: 2021-10-28
Payer: COMMERCIAL

## 2021-11-15 ENCOUNTER — OFFICE VISIT (OUTPATIENT)
Dept: FAMILY MEDICINE | Facility: CLINIC | Age: 56
End: 2021-11-15
Payer: COMMERCIAL

## 2021-11-15 VITALS
WEIGHT: 240.06 LBS | DIASTOLIC BLOOD PRESSURE: 80 MMHG | BODY MASS INDEX: 31.82 KG/M2 | RESPIRATION RATE: 16 BRPM | HEART RATE: 84 BPM | OXYGEN SATURATION: 97 % | SYSTOLIC BLOOD PRESSURE: 112 MMHG | TEMPERATURE: 98 F | HEIGHT: 73 IN

## 2021-11-15 DIAGNOSIS — Z79.4 TYPE 2 DIABETES MELLITUS WITH CHRONIC KIDNEY DISEASE ON CHRONIC DIALYSIS, WITH LONG-TERM CURRENT USE OF INSULIN: Primary | ICD-10-CM

## 2021-11-15 DIAGNOSIS — E11.22 TYPE 2 DIABETES MELLITUS WITH CHRONIC KIDNEY DISEASE ON CHRONIC DIALYSIS, WITH LONG-TERM CURRENT USE OF INSULIN: Primary | ICD-10-CM

## 2021-11-15 DIAGNOSIS — N18.6 ESRD ON HEMODIALYSIS: ICD-10-CM

## 2021-11-15 DIAGNOSIS — I10 ESSENTIAL HYPERTENSION: Chronic | ICD-10-CM

## 2021-11-15 DIAGNOSIS — L97.426 DIABETIC ULCER OF LEFT MIDFOOT ASSOCIATED WITH TYPE 2 DIABETES MELLITUS, WITH BONE INVOLVEMENT WITHOUT EVIDENCE OF NECROSIS: ICD-10-CM

## 2021-11-15 DIAGNOSIS — Z99.2 ESRD ON HEMODIALYSIS: ICD-10-CM

## 2021-11-15 DIAGNOSIS — Z99.2 TYPE 2 DIABETES MELLITUS WITH CHRONIC KIDNEY DISEASE ON CHRONIC DIALYSIS, WITH LONG-TERM CURRENT USE OF INSULIN: Primary | ICD-10-CM

## 2021-11-15 DIAGNOSIS — E11.621 DIABETIC ULCER OF LEFT MIDFOOT ASSOCIATED WITH TYPE 2 DIABETES MELLITUS, WITH BONE INVOLVEMENT WITHOUT EVIDENCE OF NECROSIS: ICD-10-CM

## 2021-11-15 DIAGNOSIS — Z00.00 WELL ADULT EXAM: ICD-10-CM

## 2021-11-15 DIAGNOSIS — N18.6 TYPE 2 DIABETES MELLITUS WITH CHRONIC KIDNEY DISEASE ON CHRONIC DIALYSIS, WITH LONG-TERM CURRENT USE OF INSULIN: Primary | ICD-10-CM

## 2021-11-15 PROCEDURE — 1159F MED LIST DOCD IN RCRD: CPT | Mod: CPTII,S$GLB,, | Performed by: FAMILY MEDICINE

## 2021-11-15 PROCEDURE — 3044F PR MOST RECENT HEMOGLOBIN A1C LEVEL <7.0%: ICD-10-PCS | Mod: CPTII,S$GLB,, | Performed by: FAMILY MEDICINE

## 2021-11-15 PROCEDURE — 3074F SYST BP LT 130 MM HG: CPT | Mod: CPTII,S$GLB,, | Performed by: FAMILY MEDICINE

## 2021-11-15 PROCEDURE — 99999 PR PBB SHADOW E&M-EST. PATIENT-LVL V: ICD-10-PCS | Mod: PBBFAC,,, | Performed by: FAMILY MEDICINE

## 2021-11-15 PROCEDURE — 1159F PR MEDICATION LIST DOCUMENTED IN MEDICAL RECORD: ICD-10-PCS | Mod: CPTII,S$GLB,, | Performed by: FAMILY MEDICINE

## 2021-11-15 PROCEDURE — 99214 PR OFFICE/OUTPT VISIT, EST, LEVL IV, 30-39 MIN: ICD-10-PCS | Mod: S$GLB,,, | Performed by: FAMILY MEDICINE

## 2021-11-15 PROCEDURE — 1160F RVW MEDS BY RX/DR IN RCRD: CPT | Mod: CPTII,S$GLB,, | Performed by: FAMILY MEDICINE

## 2021-11-15 PROCEDURE — 3008F BODY MASS INDEX DOCD: CPT | Mod: CPTII,S$GLB,, | Performed by: FAMILY MEDICINE

## 2021-11-15 PROCEDURE — 99214 OFFICE O/P EST MOD 30 MIN: CPT | Mod: S$GLB,,, | Performed by: FAMILY MEDICINE

## 2021-11-15 PROCEDURE — 3079F PR MOST RECENT DIASTOLIC BLOOD PRESSURE 80-89 MM HG: ICD-10-PCS | Mod: CPTII,S$GLB,, | Performed by: FAMILY MEDICINE

## 2021-11-15 PROCEDURE — 3074F PR MOST RECENT SYSTOLIC BLOOD PRESSURE < 130 MM HG: ICD-10-PCS | Mod: CPTII,S$GLB,, | Performed by: FAMILY MEDICINE

## 2021-11-15 PROCEDURE — 3066F PR DOCUMENTATION OF TREATMENT FOR NEPHROPATHY: ICD-10-PCS | Mod: CPTII,S$GLB,, | Performed by: FAMILY MEDICINE

## 2021-11-15 PROCEDURE — 3066F NEPHROPATHY DOC TX: CPT | Mod: CPTII,S$GLB,, | Performed by: FAMILY MEDICINE

## 2021-11-15 PROCEDURE — 99999 PR PBB SHADOW E&M-EST. PATIENT-LVL V: CPT | Mod: PBBFAC,,, | Performed by: FAMILY MEDICINE

## 2021-11-15 PROCEDURE — 3079F DIAST BP 80-89 MM HG: CPT | Mod: CPTII,S$GLB,, | Performed by: FAMILY MEDICINE

## 2021-11-15 PROCEDURE — 1160F PR REVIEW ALL MEDS BY PRESCRIBER/CLIN PHARMACIST DOCUMENTED: ICD-10-PCS | Mod: CPTII,S$GLB,, | Performed by: FAMILY MEDICINE

## 2021-11-15 PROCEDURE — 3044F HG A1C LEVEL LT 7.0%: CPT | Mod: CPTII,S$GLB,, | Performed by: FAMILY MEDICINE

## 2021-11-15 PROCEDURE — 3008F PR BODY MASS INDEX (BMI) DOCUMENTED: ICD-10-PCS | Mod: CPTII,S$GLB,, | Performed by: FAMILY MEDICINE

## 2021-11-15 RX ORDER — SEVELAMER CARBONATE 800 MG/1
800 TABLET, FILM COATED ORAL
Qty: 90 TABLET | Refills: 11 | Status: SHIPPED | OUTPATIENT
Start: 2021-11-15 | End: 2022-06-14 | Stop reason: SDUPTHER

## 2021-11-15 RX ORDER — DULAGLUTIDE 0.75 MG/.5ML
INJECTION, SOLUTION SUBCUTANEOUS
Qty: 1 PEN | Refills: 5 | Status: SHIPPED | OUTPATIENT
Start: 2021-11-15 | End: 2021-12-27 | Stop reason: SDUPTHER

## 2021-11-16 ENCOUNTER — OFFICE VISIT (OUTPATIENT)
Dept: PODIATRY | Facility: CLINIC | Age: 56
End: 2021-11-16
Payer: COMMERCIAL

## 2021-11-16 VITALS — HEIGHT: 73 IN | WEIGHT: 240 LBS | BODY MASS INDEX: 31.81 KG/M2

## 2021-11-16 DIAGNOSIS — M20.41 HAMMER TOES OF BOTH FEET: ICD-10-CM

## 2021-11-16 DIAGNOSIS — M20.42 HAMMER TOES OF BOTH FEET: ICD-10-CM

## 2021-11-16 DIAGNOSIS — M20.5X2 HALLUX LIMITUS, ACQUIRED, LEFT: ICD-10-CM

## 2021-11-16 DIAGNOSIS — Z86.31 HISTORY OF DIABETIC ULCER OF FOOT: ICD-10-CM

## 2021-11-16 DIAGNOSIS — M20.5X1 HALLUX LIMITUS, ACQUIRED, RIGHT: ICD-10-CM

## 2021-11-16 DIAGNOSIS — L90.9 PLANTAR FAT PAD ATROPHY: ICD-10-CM

## 2021-11-16 DIAGNOSIS — E11.49 TYPE II DIABETES MELLITUS WITH NEUROLOGICAL MANIFESTATIONS: Primary | ICD-10-CM

## 2021-11-16 DIAGNOSIS — M21.6X1 PLANTARFLEXION DEFORMITY OF RIGHT FOOT: ICD-10-CM

## 2021-11-16 DIAGNOSIS — L84 PRE-ULCERATIVE CALLUSES: ICD-10-CM

## 2021-11-16 PROCEDURE — 1160F PR REVIEW ALL MEDS BY PRESCRIBER/CLIN PHARMACIST DOCUMENTED: ICD-10-PCS | Mod: CPTII,S$GLB,, | Performed by: PODIATRIST

## 2021-11-16 PROCEDURE — 3008F PR BODY MASS INDEX (BMI) DOCUMENTED: ICD-10-PCS | Mod: CPTII,S$GLB,, | Performed by: PODIATRIST

## 2021-11-16 PROCEDURE — 1159F PR MEDICATION LIST DOCUMENTED IN MEDICAL RECORD: ICD-10-PCS | Mod: CPTII,S$GLB,, | Performed by: PODIATRIST

## 2021-11-16 PROCEDURE — 99999 PR PBB SHADOW E&M-EST. PATIENT-LVL III: ICD-10-PCS | Mod: PBBFAC,,, | Performed by: PODIATRIST

## 2021-11-16 PROCEDURE — 3066F NEPHROPATHY DOC TX: CPT | Mod: CPTII,S$GLB,, | Performed by: PODIATRIST

## 2021-11-16 PROCEDURE — 99999 PR PBB SHADOW E&M-EST. PATIENT-LVL III: CPT | Mod: PBBFAC,,, | Performed by: PODIATRIST

## 2021-11-16 PROCEDURE — 3008F BODY MASS INDEX DOCD: CPT | Mod: CPTII,S$GLB,, | Performed by: PODIATRIST

## 2021-11-16 PROCEDURE — 99213 OFFICE O/P EST LOW 20 MIN: CPT | Mod: S$GLB,,, | Performed by: PODIATRIST

## 2021-11-16 PROCEDURE — 99213 PR OFFICE/OUTPT VISIT, EST, LEVL III, 20-29 MIN: ICD-10-PCS | Mod: S$GLB,,, | Performed by: PODIATRIST

## 2021-11-16 PROCEDURE — 1159F MED LIST DOCD IN RCRD: CPT | Mod: CPTII,S$GLB,, | Performed by: PODIATRIST

## 2021-11-16 PROCEDURE — 3044F HG A1C LEVEL LT 7.0%: CPT | Mod: CPTII,S$GLB,, | Performed by: PODIATRIST

## 2021-11-16 PROCEDURE — 1160F RVW MEDS BY RX/DR IN RCRD: CPT | Mod: CPTII,S$GLB,, | Performed by: PODIATRIST

## 2021-11-16 PROCEDURE — 3066F PR DOCUMENTATION OF TREATMENT FOR NEPHROPATHY: ICD-10-PCS | Mod: CPTII,S$GLB,, | Performed by: PODIATRIST

## 2021-11-16 PROCEDURE — 3044F PR MOST RECENT HEMOGLOBIN A1C LEVEL <7.0%: ICD-10-PCS | Mod: CPTII,S$GLB,, | Performed by: PODIATRIST

## 2021-11-27 ENCOUNTER — TELEPHONE (OUTPATIENT)
Dept: PODIATRY | Facility: CLINIC | Age: 56
End: 2021-11-27
Payer: COMMERCIAL

## 2021-11-30 RX ORDER — HYDRALAZINE HYDROCHLORIDE 50 MG/1
50 TABLET, FILM COATED ORAL 3 TIMES DAILY
Qty: 300 TABLET | Refills: 3 | Status: SHIPPED | OUTPATIENT
Start: 2021-11-30 | End: 2022-10-18 | Stop reason: SDUPTHER

## 2021-12-01 ENCOUNTER — PATIENT MESSAGE (OUTPATIENT)
Dept: PODIATRY | Facility: CLINIC | Age: 56
End: 2021-12-01
Payer: COMMERCIAL

## 2021-12-06 ENCOUNTER — PATIENT OUTREACH (OUTPATIENT)
Dept: ADMINISTRATIVE | Facility: OTHER | Age: 56
End: 2021-12-06
Payer: COMMERCIAL

## 2021-12-07 ENCOUNTER — OFFICE VISIT (OUTPATIENT)
Dept: PODIATRY | Facility: CLINIC | Age: 56
End: 2021-12-07
Payer: COMMERCIAL

## 2021-12-07 VITALS — WEIGHT: 240 LBS | HEIGHT: 73 IN | BODY MASS INDEX: 31.81 KG/M2

## 2021-12-07 DIAGNOSIS — L90.9 PLANTAR FAT PAD ATROPHY: ICD-10-CM

## 2021-12-07 DIAGNOSIS — Z86.31 HISTORY OF DIABETIC ULCER OF FOOT: ICD-10-CM

## 2021-12-07 DIAGNOSIS — L84 PRE-ULCERATIVE CALLUSES: ICD-10-CM

## 2021-12-07 DIAGNOSIS — E11.49 TYPE II DIABETES MELLITUS WITH NEUROLOGICAL MANIFESTATIONS: Primary | ICD-10-CM

## 2021-12-07 PROCEDURE — 99999 PR PBB SHADOW E&M-EST. PATIENT-LVL IV: CPT | Mod: PBBFAC,,, | Performed by: PODIATRIST

## 2021-12-07 PROCEDURE — 3066F PR DOCUMENTATION OF TREATMENT FOR NEPHROPATHY: ICD-10-PCS | Mod: CPTII,S$GLB,, | Performed by: PODIATRIST

## 2021-12-07 PROCEDURE — 3066F NEPHROPATHY DOC TX: CPT | Mod: CPTII,S$GLB,, | Performed by: PODIATRIST

## 2021-12-07 PROCEDURE — 99213 OFFICE O/P EST LOW 20 MIN: CPT | Mod: S$GLB,,, | Performed by: PODIATRIST

## 2021-12-07 PROCEDURE — 99213 PR OFFICE/OUTPT VISIT, EST, LEVL III, 20-29 MIN: ICD-10-PCS | Mod: S$GLB,,, | Performed by: PODIATRIST

## 2021-12-07 PROCEDURE — 99999 PR PBB SHADOW E&M-EST. PATIENT-LVL IV: ICD-10-PCS | Mod: PBBFAC,,, | Performed by: PODIATRIST

## 2021-12-13 ENCOUNTER — TELEPHONE (OUTPATIENT)
Dept: PODIATRY | Facility: CLINIC | Age: 56
End: 2021-12-13
Payer: COMMERCIAL

## 2021-12-15 ENCOUNTER — TELEPHONE (OUTPATIENT)
Dept: VASCULAR SURGERY | Facility: CLINIC | Age: 56
End: 2021-12-15
Payer: COMMERCIAL

## 2021-12-15 ENCOUNTER — TELEPHONE (OUTPATIENT)
Dept: TRANSPLANT | Facility: CLINIC | Age: 56
End: 2021-12-15
Payer: COMMERCIAL

## 2021-12-17 ENCOUNTER — TELEPHONE (OUTPATIENT)
Dept: TRANSPLANT | Facility: CLINIC | Age: 56
End: 2021-12-17
Payer: COMMERCIAL

## 2021-12-17 DIAGNOSIS — Z79.4 TYPE 2 DIABETES MELLITUS WITH CHRONIC KIDNEY DISEASE ON CHRONIC DIALYSIS, WITH LONG-TERM CURRENT USE OF INSULIN: ICD-10-CM

## 2021-12-17 DIAGNOSIS — E11.22 TYPE 2 DIABETES MELLITUS WITH CHRONIC KIDNEY DISEASE ON CHRONIC DIALYSIS, WITH LONG-TERM CURRENT USE OF INSULIN: ICD-10-CM

## 2021-12-17 DIAGNOSIS — N18.6 TYPE 2 DIABETES MELLITUS WITH CHRONIC KIDNEY DISEASE ON CHRONIC DIALYSIS, WITH LONG-TERM CURRENT USE OF INSULIN: ICD-10-CM

## 2021-12-17 DIAGNOSIS — Z99.2 TYPE 2 DIABETES MELLITUS WITH CHRONIC KIDNEY DISEASE ON CHRONIC DIALYSIS, WITH LONG-TERM CURRENT USE OF INSULIN: ICD-10-CM

## 2021-12-21 ENCOUNTER — TELEPHONE (OUTPATIENT)
Dept: TRANSPLANT | Facility: CLINIC | Age: 56
End: 2021-12-21
Payer: COMMERCIAL

## 2021-12-27 DIAGNOSIS — Z99.2 TYPE 2 DIABETES MELLITUS WITH CHRONIC KIDNEY DISEASE ON CHRONIC DIALYSIS, WITH LONG-TERM CURRENT USE OF INSULIN: ICD-10-CM

## 2021-12-27 DIAGNOSIS — E11.22 TYPE 2 DIABETES MELLITUS WITH CHRONIC KIDNEY DISEASE ON CHRONIC DIALYSIS, WITH LONG-TERM CURRENT USE OF INSULIN: ICD-10-CM

## 2021-12-27 DIAGNOSIS — N18.6 TYPE 2 DIABETES MELLITUS WITH CHRONIC KIDNEY DISEASE ON CHRONIC DIALYSIS, WITH LONG-TERM CURRENT USE OF INSULIN: ICD-10-CM

## 2021-12-27 DIAGNOSIS — Z79.4 TYPE 2 DIABETES MELLITUS WITH CHRONIC KIDNEY DISEASE ON CHRONIC DIALYSIS, WITH LONG-TERM CURRENT USE OF INSULIN: ICD-10-CM

## 2021-12-27 RX ORDER — DULAGLUTIDE 0.75 MG/.5ML
INJECTION, SOLUTION SUBCUTANEOUS
Qty: 12 PEN | Refills: 0 | Status: SHIPPED | OUTPATIENT
Start: 2021-12-27 | End: 2022-06-17 | Stop reason: SDUPTHER

## 2021-12-27 RX ORDER — DULAGLUTIDE 0.75 MG/.5ML
INJECTION, SOLUTION SUBCUTANEOUS
OUTPATIENT
Start: 2021-12-27

## 2021-12-28 ENCOUNTER — OFFICE VISIT (OUTPATIENT)
Dept: PODIATRY | Facility: CLINIC | Age: 56
End: 2021-12-28
Payer: COMMERCIAL

## 2021-12-28 VITALS — BODY MASS INDEX: 31.82 KG/M2 | HEIGHT: 73 IN | WEIGHT: 240.06 LBS

## 2021-12-28 DIAGNOSIS — L90.9 PLANTAR FAT PAD ATROPHY: ICD-10-CM

## 2021-12-28 DIAGNOSIS — M20.41 HAMMER TOES OF BOTH FEET: ICD-10-CM

## 2021-12-28 DIAGNOSIS — M20.5X2 HALLUX LIMITUS, ACQUIRED, LEFT: ICD-10-CM

## 2021-12-28 DIAGNOSIS — M20.5X1 HALLUX LIMITUS, ACQUIRED, RIGHT: ICD-10-CM

## 2021-12-28 DIAGNOSIS — M20.42 HAMMER TOES OF BOTH FEET: ICD-10-CM

## 2021-12-28 DIAGNOSIS — Z86.31 HISTORY OF DIABETIC ULCER OF FOOT: ICD-10-CM

## 2021-12-28 DIAGNOSIS — M21.6X1 PLANTARFLEXION DEFORMITY OF RIGHT FOOT: ICD-10-CM

## 2021-12-28 DIAGNOSIS — E11.49 TYPE II DIABETES MELLITUS WITH NEUROLOGICAL MANIFESTATIONS: Primary | ICD-10-CM

## 2021-12-28 DIAGNOSIS — L84 PRE-ULCERATIVE CALLUSES: ICD-10-CM

## 2021-12-28 PROCEDURE — 3008F BODY MASS INDEX DOCD: CPT | Mod: CPTII,S$GLB,, | Performed by: PODIATRIST

## 2021-12-28 PROCEDURE — 1160F PR REVIEW ALL MEDS BY PRESCRIBER/CLIN PHARMACIST DOCUMENTED: ICD-10-PCS | Mod: CPTII,S$GLB,, | Performed by: PODIATRIST

## 2021-12-28 PROCEDURE — 99999 PR PBB SHADOW E&M-EST. PATIENT-LVL III: ICD-10-PCS | Mod: PBBFAC,,, | Performed by: PODIATRIST

## 2021-12-28 PROCEDURE — 3008F PR BODY MASS INDEX (BMI) DOCUMENTED: ICD-10-PCS | Mod: CPTII,S$GLB,, | Performed by: PODIATRIST

## 2021-12-28 PROCEDURE — 99213 OFFICE O/P EST LOW 20 MIN: CPT | Mod: S$GLB,,, | Performed by: PODIATRIST

## 2021-12-28 PROCEDURE — 1159F MED LIST DOCD IN RCRD: CPT | Mod: CPTII,S$GLB,, | Performed by: PODIATRIST

## 2021-12-28 PROCEDURE — 99213 PR OFFICE/OUTPT VISIT, EST, LEVL III, 20-29 MIN: ICD-10-PCS | Mod: S$GLB,,, | Performed by: PODIATRIST

## 2021-12-28 PROCEDURE — 1159F PR MEDICATION LIST DOCUMENTED IN MEDICAL RECORD: ICD-10-PCS | Mod: CPTII,S$GLB,, | Performed by: PODIATRIST

## 2021-12-28 PROCEDURE — 99999 PR PBB SHADOW E&M-EST. PATIENT-LVL III: CPT | Mod: PBBFAC,,, | Performed by: PODIATRIST

## 2021-12-28 PROCEDURE — 1160F RVW MEDS BY RX/DR IN RCRD: CPT | Mod: CPTII,S$GLB,, | Performed by: PODIATRIST

## 2021-12-29 NOTE — PROGRESS NOTES
[facility]   Progress Note    Subjective:       Patient ID: Catalino Mcfadden is a 56 y.o. male.    Chief Complaint: Diabetes Mellitus and Follow-up    Patient ambulated to exam room with mepilex, tubigrip, and New Balance shoe on. No new pedal complaints    10/26/2021 patient presents to podiatry clinic s/p discharge from wound clinic.  He relates th has care for foot as instructed and relates subjectively remains healed however he has concerns that excessive ambulationon return to work may case wound to recur    11/16/2021 He relates th has care for foot as instructed and relates subjectively remains healed but has increased callus formation. He has bee padding the area and wearing new balance tennis shoes. He has not returned to work. He would like to discuss returning to work.    12/07/2021 he continues to cushion previous wound site and wear new balance tennis shoes. Subjectively remains healed but has significant callus formation.  He has not returned to work. He would like to discuss return to work timeline.    12/28/2021 he continues to cushion previous wound site and wear new balance tennis shoes. Subjectively remains healed but has significant callus formation.  He has not returned to work    Wound Check    Follow-up  Associated symptoms include arthralgias, headaches, joint swelling, myalgias and numbness. Pertinent negatives include no chest pain, chills, coughing, fever, nausea, vomiting or weakness.       Review of Systems   Constitutional: Negative for appetite change, chills and fever.   Respiratory: Negative for cough, shortness of breath and wheezing.    Cardiovascular: Positive for leg swelling. Negative for chest pain.   Gastrointestinal: Negative for diarrhea, nausea and vomiting.   Musculoskeletal: Positive for arthralgias, joint swelling and myalgias.   Skin: Positive for wound.   Neurological: Positive for numbness and headaches. Negative for weakness.        + paresthesia       "    Objective:     Vitals:    12/28/21 1112   Weight: 108.9 kg (240 lb 1.3 oz)   Height: 6' 1" (1.854 m)   PainSc: 0-No pain          Physical Exam  Vitals and nursing note reviewed.   Constitutional:       General: He is not in acute distress.     Appearance: He is not toxic-appearing or diaphoretic.      Comments: Pt. is well-developed, well-nourished, appears stated age, in no acute distress, alert and oriented x 3. No evidence of depression, anxiety, or agitation. Calm, cooperative, and communicative. Appropriate interactions and affect.   Cardiovascular:      Pulses:           Dorsalis pedis pulses are 2+ on the right side and 2+ on the left side.        Posterior tibial pulses are 1+ on the right side and 1+ on the left side.      Comments: There is decreased digital hair. Skin is atrophic, slightly hyperpigmented  Pulmonary:      Effort: No respiratory distress.   Musculoskeletal:      Right ankle: No swelling. No tenderness. No lateral malleolus, medial malleolus, AITF ligament, CF ligament or posterior TF ligament tenderness. Normal range of motion.      Right Achilles Tendon: No defects. Hilliard's test negative.      Left ankle: No swelling. No tenderness. No lateral malleolus, medial malleolus, AITF ligament, CF ligament or posterior TF ligament tenderness. Normal range of motion.      Left Achilles Tendon: No defects. Hilliard's test negative.      Right foot: No tenderness or bony tenderness.      Left foot: No tenderness or bony tenderness.      Comments: Decreased stride, station of gait.  apropulsive toe off.  Increased angle and base of gait.    There is equinus deformity bilateral with decreased dorsiflexion at the ankle joint bilateral. No tenderness with compression of heel. Negative tinels sign. Gait analysis reveals excessive pronation through midstance and propulsion with early heel off.     Patient has hammertoes of digits 2-5 bilateral partially reducible     Decreased first MPJ range of " motion both weightbearing and nonweightbearing, no crepitus observed the first MP joint, + dorsal flag sign.     Visible and palpable bunion without pain at dorsomedial 1st metatarsal head right and left.  Hallux abducted right and left partially reducible, tracks laterally without being track bound.  No ecchymosis, erythema, edema, or cardinal signs infection or signs of trauma same foot.    Fat pad atrophy to heels and met heads bilateral    Plantarflexed 1st ray RLE   Lymphadenopathy:      Comments: No lymphatic streaking    Negative lymphadenopathy bilateral popliteal fossa and tarsal tunnel.     Skin:     General: Skin is warm and dry.      Coloration: Skin is not pale.      Findings: Erythema and lesion (see wound description below) present. No abrasion, ecchymosis, laceration or rash.      Nails: There is no clubbing.      Comments: Toenails 1-5 bilaterally discolored/yellowed, dystrophic, brittle with subungual debris.    Neurological:      Sensory: Sensory deficit present.      Comments:  [place] 5.07 monofilamant testing is diminished Kp feet. Decreased/absent vibratory sensation bilateral feet to 128Hz tuning fork.     Paresthesias, and hyperesthesia bilateral feet with no clearly identified trigger or source.           Psychiatric:         Attention and Perception: He is attentive.         Mood and Affect: Mood is not anxious. Affect is not inappropriate.         Speech: He is communicative. Speech is not slurred.         Behavior: Behavior is not combative.           12/28/2021 12/07/2021 11/16/2021    Appears preulcerative            10/26/2021          Assessment:           ICD-10-CM ICD-9-CM   1. Type II diabetes mellitus with neurological manifestations  E11.49 250.60   2. Pre-ulcerative calluses  L84 700   3. History of diabetic ulcer of foot  Z86.31 V12.29   4. Plantar fat pad atrophy  L90.9 701.9   5. Plantarflexion deformity of right foot  M21.6X1 736.79    6. Hammer toes of both feet  M20.41 735.4    M20.42    7. Hallux limitus, acquired, right  M20.5X1 735.8   8. Hallux limitus, acquired, left  M20.5X2 735.8                Plan:         Wound has remained healed however it has bleeding between the layers and fragile epithelium.  I am concerned about reopening. Patient aware that area vulnerable to re-ulceration and he is very concerned about slowly getting back to regular activities. Will continue to use compression to LLE as he is prone to swelling and continue mepilex pads for use at home.     He is at high risk for reulceration with excess ambulation, he works as a  and also at times has to unload.  I am not confident he can return to work without reopening, in perpetuity.    Patient advised to call if he should be concerned     No orders of the defined types were placed in this encounter.       No follow-ups on file.

## 2021-12-30 ENCOUNTER — PATIENT MESSAGE (OUTPATIENT)
Dept: TRANSPLANT | Facility: CLINIC | Age: 56
End: 2021-12-30
Payer: COMMERCIAL

## 2021-12-30 DIAGNOSIS — Z76.82 ORGAN TRANSPLANT CANDIDATE: ICD-10-CM

## 2022-01-02 LAB — NONINV COLON CA DNA+OCC BLD SCRN STL QL: NORMAL

## 2022-01-07 ENCOUNTER — TELEPHONE (OUTPATIENT)
Dept: TRANSPLANT | Facility: CLINIC | Age: 57
End: 2022-01-07
Payer: COMMERCIAL

## 2022-01-18 ENCOUNTER — PATIENT OUTREACH (OUTPATIENT)
Dept: ADMINISTRATIVE | Facility: OTHER | Age: 57
End: 2022-01-18
Payer: COMMERCIAL

## 2022-01-18 ENCOUNTER — OFFICE VISIT (OUTPATIENT)
Dept: PODIATRY | Facility: CLINIC | Age: 57
End: 2022-01-18
Payer: COMMERCIAL

## 2022-01-18 VITALS — BODY MASS INDEX: 31.81 KG/M2 | HEIGHT: 73 IN | WEIGHT: 240 LBS

## 2022-01-18 DIAGNOSIS — Z86.31 HISTORY OF DIABETIC ULCER OF FOOT: ICD-10-CM

## 2022-01-18 DIAGNOSIS — L90.9 PLANTAR FAT PAD ATROPHY: ICD-10-CM

## 2022-01-18 DIAGNOSIS — L84 PRE-ULCERATIVE CALLUSES: ICD-10-CM

## 2022-01-18 DIAGNOSIS — E11.49 TYPE II DIABETES MELLITUS WITH NEUROLOGICAL MANIFESTATIONS: Primary | ICD-10-CM

## 2022-01-18 DIAGNOSIS — M21.6X1 PLANTARFLEXION DEFORMITY OF RIGHT FOOT: ICD-10-CM

## 2022-01-18 PROCEDURE — 1159F PR MEDICATION LIST DOCUMENTED IN MEDICAL RECORD: ICD-10-PCS | Mod: CPTII,S$GLB,, | Performed by: PODIATRIST

## 2022-01-18 PROCEDURE — 1159F MED LIST DOCD IN RCRD: CPT | Mod: CPTII,S$GLB,, | Performed by: PODIATRIST

## 2022-01-18 PROCEDURE — 99999 PR PBB SHADOW E&M-EST. PATIENT-LVL IV: ICD-10-PCS | Mod: PBBFAC,,, | Performed by: PODIATRIST

## 2022-01-18 PROCEDURE — 99213 PR OFFICE/OUTPT VISIT, EST, LEVL III, 20-29 MIN: ICD-10-PCS | Mod: S$GLB,,, | Performed by: PODIATRIST

## 2022-01-18 PROCEDURE — 1160F RVW MEDS BY RX/DR IN RCRD: CPT | Mod: CPTII,S$GLB,, | Performed by: PODIATRIST

## 2022-01-18 PROCEDURE — 99999 PR PBB SHADOW E&M-EST. PATIENT-LVL IV: CPT | Mod: PBBFAC,,, | Performed by: PODIATRIST

## 2022-01-18 PROCEDURE — 1160F PR REVIEW ALL MEDS BY PRESCRIBER/CLIN PHARMACIST DOCUMENTED: ICD-10-PCS | Mod: CPTII,S$GLB,, | Performed by: PODIATRIST

## 2022-01-18 PROCEDURE — 3008F PR BODY MASS INDEX (BMI) DOCUMENTED: ICD-10-PCS | Mod: CPTII,S$GLB,, | Performed by: PODIATRIST

## 2022-01-18 PROCEDURE — 3008F BODY MASS INDEX DOCD: CPT | Mod: CPTII,S$GLB,, | Performed by: PODIATRIST

## 2022-01-18 PROCEDURE — 99213 OFFICE O/P EST LOW 20 MIN: CPT | Mod: S$GLB,,, | Performed by: PODIATRIST

## 2022-01-19 NOTE — PROGRESS NOTES
[facility]   Progress Note    Subjective:       Patient ID: Catalino Mcfadden is a 56 y.o. male.    Chief Complaint: Diabetes Mellitus (PCP Dr.Lombard 11/15/2021), Foot Problem, and Follow-up    Patient ambulated to exam room with mepilex, tubigrip, and New Balance shoe on. No new pedal complaints    10/26/2021 patient presents to podiatry clinic s/p discharge from wound clinic.  He relates th has care for foot as instructed and relates subjectively remains healed however he has concerns that excessive ambulationon return to work may case wound to recur    11/16/2021 He relates th has care for foot as instructed and relates subjectively remains healed but has increased callus formation. He has bee padding the area and wearing new balance tennis shoes. He has not returned to work. He would like to discuss returning to work.    12/07/2021 he continues to cushion previous wound site and wear new balance tennis shoes. Subjectively remains healed but has significant callus formation.  He has not returned to work. He would like to discuss return to work timeline.    12/28/2021 he continues to cushion previous wound site and wear new balance tennis shoes. Subjectively remains healed but has significant callus formation.  He has not returned to work    1/18/2022 he continues to cushion previous wound site and wear new balance tennis shoes. Subjectively remains healed but has significant callus formation.  He has accepted a new position and began work yesterday. He no longer has to move [facility]    Follow-up  Associated symptoms include arthralgias, headaches, joint swelling, myalgias and numbness. Pertinent negatives include no chest pain, chills, coughing, fever, nausea, vomiting or weakness.   Wound Check        Review of Systems   Constitutional: Negative for appetite change, chills and fever.   Respiratory: Negative for cough, shortness of breath and wheezing.    Cardiovascular: Positive for leg  "swelling. Negative for chest pain.   Gastrointestinal: Negative for diarrhea, nausea and vomiting.   Musculoskeletal: Positive for arthralgias, joint swelling and myalgias.   Skin: Positive for wound.   Neurological: Positive for numbness and headaches. Negative for weakness.        + paresthesia          Objective:     Vitals:    01/18/22 1048   Weight: 108.9 kg (240 lb)   Height: 6' 1" (1.854 m)   PainSc: 0-No pain          Physical Exam  Vitals and nursing note reviewed.   Constitutional:       General: He is not in acute distress.     Appearance: He is not toxic-appearing or diaphoretic.      Comments: Pt. is well-developed, well-nourished, appears stated age, in no acute distress, alert and oriented x 3. No evidence of depression, anxiety, or agitation. Calm, cooperative, and communicative. Appropriate interactions and affect.   Cardiovascular:      Pulses:           Dorsalis pedis pulses are 2+ on the right side and 2+ on the left side.        Posterior tibial pulses are 1+ on the right side and 1+ on the left side.      Comments: There is decreased digital hair. Skin is atrophic, slightly hyperpigmented  Pulmonary:      Effort: No respiratory distress.   Musculoskeletal:      Right ankle: No swelling. No tenderness. No lateral malleolus, medial malleolus, AITF ligament, CF ligament or posterior TF ligament tenderness. Normal range of motion.      Right Achilles Tendon: No defects. Hilliard's test negative.      Left ankle: No swelling. No tenderness. No lateral malleolus, medial malleolus, AITF ligament, CF ligament or posterior TF ligament tenderness. Normal range of motion.      Left Achilles Tendon: No defects. Hilliard's test negative.      Right foot: No tenderness or bony tenderness.      Left foot: No tenderness or bony tenderness.      Comments: Decreased stride, station of gait.  apropulsive toe off.  Increased angle and base of gait.    There is equinus deformity bilateral with decreased " dorsiflexion at the ankle joint bilateral. No tenderness with compression of heel. Negative tinels sign. Gait analysis reveals excessive pronation through midstance and propulsion with early heel off.     Patient has hammertoes of digits 2-5 bilateral partially reducible     Decreased first MPJ range of motion both weightbearing and nonweightbearing, no crepitus observed the first MP joint, + dorsal flag sign.     Visible and palpable bunion without pain at dorsomedial 1st metatarsal head right and left.  Hallux abducted right and left partially reducible, tracks laterally without being track bound.  No ecchymosis, erythema, edema, or cardinal signs infection or signs of trauma same foot.    Fat pad atrophy to heels and met heads bilateral    Plantarflexed 1st ray RLE   Lymphadenopathy:      Comments: No lymphatic streaking    Negative lymphadenopathy bilateral popliteal fossa and tarsal tunnel.     Skin:     General: Skin is warm and dry.      Coloration: Skin is not pale.      Findings: Erythema and lesion (see wound description below) present. No abrasion, ecchymosis, laceration or rash.      Nails: There is no clubbing.      Comments: Toenails 1-5 bilaterally discolored/yellowed, dystrophic, brittle with subungual debris.    Neurological:      Sensory: Sensory deficit present.      Comments:  [place] 5.07 monofilamant testing is diminished Kp feet. Decreased/absent vibratory sensation bilateral feet to 128Hz tuning fork.     Paresthesias, and hyperesthesia bilateral feet with no clearly identified trigger or source.           Psychiatric:         Attention and Perception: He is attentive.         Mood and Affect: Mood is not anxious. Affect is not inappropriate.         Speech: He is communicative. Speech is not slurred.         Behavior: Behavior is not combative.           1/18/2022    significant bleeding in  layers              12/28/2021 12/07/2021 11/16/2021    Appears preulcerative            10/26/2021          Assessment:           ICD-10-CM ICD-9-CM   1. Type II diabetes mellitus with neurological manifestations  E11.49 250.60   2. Pre-ulcerative calluses  L84 700   3. History of diabetic ulcer of foot  Z86.31 V12.29   4. Plantar fat pad atrophy  L90.9 701.9   5. Plantarflexion deformity of right foot  M21.6X1 736.79                Plan:         Wound site nearly reulcerated with bleeding between the layers and fragile epithelium.  I am concerned about reopening. Patient aware that area vulnerable to re-ulceration and he is very concerned about slowly getting back to regular activities. Will continue to use compression to LLE as he is prone to swelling and continue mepilex pads for use at home.     He is at high risk for reulceration with excess ambulation    Patient advised to call if he should be concerned     No orders of the defined types were placed in this encounter.       No follow-ups on file.

## 2022-01-25 ENCOUNTER — OFFICE VISIT (OUTPATIENT)
Dept: PODIATRY | Facility: CLINIC | Age: 57
End: 2022-01-25
Payer: COMMERCIAL

## 2022-01-25 ENCOUNTER — HOSPITAL ENCOUNTER (OUTPATIENT)
Dept: RADIOLOGY | Facility: HOSPITAL | Age: 57
Discharge: HOME | End: 2022-01-25
Attending: PODIATRIST
Payer: COMMERCIAL

## 2022-01-25 VITALS — HEIGHT: 73 IN | WEIGHT: 240.06 LBS | BODY MASS INDEX: 31.82 KG/M2

## 2022-01-25 DIAGNOSIS — L97.522 FOOT ULCERATION, LEFT, WITH FAT LAYER EXPOSED: Primary | ICD-10-CM

## 2022-01-25 DIAGNOSIS — L97.522 FOOT ULCERATION, LEFT, WITH FAT LAYER EXPOSED: ICD-10-CM

## 2022-01-25 PROCEDURE — 87075 CULTR BACTERIA EXCEPT BLOOD: CPT | Mod: NTX | Performed by: PODIATRIST

## 2022-01-25 PROCEDURE — 11042 PR DEBRIDEMENT, SKIN, SUB-Q TISSUE,=<20 SQ CM: ICD-10-PCS | Mod: S$GLB,,, | Performed by: PODIATRIST

## 2022-01-25 PROCEDURE — 87070 CULTURE OTHR SPECIMN AEROBIC: CPT | Mod: TXP | Performed by: PODIATRIST

## 2022-01-25 PROCEDURE — 73630 X-RAY EXAM OF FOOT: CPT | Mod: TC,FY,PO,LT,TXP

## 2022-01-25 PROCEDURE — 11042 DBRDMT SUBQ TIS 1ST 20SQCM/<: CPT | Mod: S$GLB,,, | Performed by: PODIATRIST

## 2022-01-25 PROCEDURE — 73630 X-RAY EXAM OF FOOT: CPT | Mod: 26,LT,TXP, | Performed by: RADIOLOGY

## 2022-01-25 PROCEDURE — 99999 PR PBB SHADOW E&M-EST. PATIENT-LVL IV: ICD-10-PCS | Mod: PBBFAC,,, | Performed by: PODIATRIST

## 2022-01-25 PROCEDURE — 99999 PR PBB SHADOW E&M-EST. PATIENT-LVL IV: CPT | Mod: PBBFAC,,, | Performed by: PODIATRIST

## 2022-01-25 PROCEDURE — 73630 XR FOOT COMPLETE 3 VIEW LEFT: ICD-10-PCS | Mod: 26,LT,TXP, | Performed by: RADIOLOGY

## 2022-01-25 PROCEDURE — 99214 PR OFFICE/OUTPT VISIT, EST, LEVL IV, 30-39 MIN: ICD-10-PCS | Mod: 25,S$GLB,, | Performed by: PODIATRIST

## 2022-01-25 PROCEDURE — 99214 OFFICE O/P EST MOD 30 MIN: CPT | Mod: 25,S$GLB,, | Performed by: PODIATRIST

## 2022-01-25 RX ORDER — DOXYCYCLINE 100 MG/1
100 CAPSULE ORAL EVERY 12 HOURS
Qty: 20 CAPSULE | Refills: 0 | Status: SHIPPED | OUTPATIENT
Start: 2022-01-25 | End: 2022-02-23 | Stop reason: ALTCHOICE

## 2022-01-25 NOTE — PROGRESS NOTES
[facility]   Progress Note    Subjective:       Patient ID: Catalino Mcfadden is a 56 y.o. male.    Chief Complaint: Wound Check    Patient ambulated to exam room with mepilex, tubigrip, and New Balance shoe on. No new pedal complaints    10/26/2021 patient presents to podiatry clinic s/p discharge from wound clinic.  He relates th has care for foot as instructed and relates subjectively remains healed however he has concerns that excessive ambulationon return to work may case wound to recur    11/16/2021 He relates th has care for foot as instructed and relates subjectively remains healed but has increased callus formation. He has bee padding the area and wearing new balance tennis shoes. He has not returned to work. He would like to discuss returning to work.    12/07/2021 he continues to cushion previous wound site and wear new balance tennis shoes. Subjectively remains healed but has significant callus formation.  He has not returned to work. He would like to discuss return to work timeline.    12/28/2021 he continues to cushion previous wound site and wear new balance tennis shoes. Subjectively remains healed but has significant callus formation.  He has not returned to work    1/18/2022 he continues to cushion previous wound site and wear new balance tennis shoes. Subjectively remains healed but has significant callus formation.  He has accepted a new position and began work yesterday. He no longer has to move [employer]    1/25/22: Reports noticing drainage left plantar lateral foot. Denies N/V/F/C. Patient presented as walk in to clinic    Follow-up  Associated symptoms include arthralgias, headaches, joint swelling, myalgias and numbness. Pertinent negatives include no chest pain, chills, coughing, fever, nausea, vomiting or weakness.   Wound Check        Review of Systems   Constitutional: Negative for appetite change, chills and fever.   Respiratory: Negative for cough, shortness of breath  "and wheezing.    Cardiovascular: Positive for leg swelling. Negative for chest pain.   Gastrointestinal: Negative for diarrhea, nausea and vomiting.   Musculoskeletal: Positive for arthralgias, joint swelling and myalgias.   Skin: Positive for wound.   Neurological: Positive for numbness and headaches. Negative for weakness.        + paresthesia          Objective:     Vitals:    01/25/22 0857   Weight: 108.9 kg (240 lb 1.3 oz)   Height: 6' 1" (1.854 m)   PainSc:   2          Physical Exam  Vitals and nursing note reviewed.   Constitutional:       General: He is not in acute distress.     Appearance: He is not toxic-appearing or diaphoretic.      Comments: Pt. is well-developed, well-nourished, appears stated age, in no acute distress, alert and oriented x 3. No evidence of depression, anxiety, or agitation. Calm, cooperative, and communicative. Appropriate interactions and affect.   Cardiovascular:      Pulses:           Dorsalis pedis pulses are 2+ on the right side and 2+ on the left side.        Posterior tibial pulses are 1+ on the right side and 1+ on the left side.      Comments: There is decreased digital hair. Skin is atrophic, slightly hyperpigmented  Pulmonary:      Effort: No respiratory distress.   Musculoskeletal:      Right ankle: No swelling. No tenderness. No lateral malleolus, medial malleolus, AITF ligament, CF ligament or posterior TF ligament tenderness. Normal range of motion.      Right Achilles Tendon: No defects. Hilliard's test negative.      Left ankle: No swelling. No tenderness. No lateral malleolus, medial malleolus, AITF ligament, CF ligament or posterior TF ligament tenderness. Normal range of motion.      Left Achilles Tendon: No defects. Hilliard's test negative.      Right foot: No tenderness or bony tenderness.      Left foot: No tenderness or bony tenderness.      Comments: Decreased stride, station of gait.  apropulsive toe off.  Increased angle and base of gait.    There is " equinus deformity bilateral with decreased dorsiflexion at the ankle joint bilateral. No tenderness with compression of heel. Negative tinels sign. Gait analysis reveals excessive pronation through midstance and propulsion with early heel off.     Patient has hammertoes of digits 2-5 bilateral partially reducible     Decreased first MPJ range of motion both weightbearing and nonweightbearing, no crepitus observed the first MP joint, + dorsal flag sign.     Visible and palpable bunion without pain at dorsomedial 1st metatarsal head right and left.  Hallux abducted right and left partially reducible, tracks laterally without being track bound.  No ecchymosis, erythema, edema, or cardinal signs infection or signs of trauma same foot.    Fat pad atrophy to heels and met heads bilateral    Plantarflexed 1st ray RLE   Lymphadenopathy:      Comments: No lymphatic streaking    Negative lymphadenopathy bilateral popliteal fossa and tarsal tunnel.     Skin:     General: Skin is warm and dry.      Coloration: Skin is not pale.      Findings: Erythema and lesion (see wound description below) present. No abrasion, ecchymosis, laceration or rash.      Nails: There is no clubbing.      Comments: Toenails 1-5 bilaterally discolored/yellowed, dystrophic, brittle with subungual debris.    Neurological:      Sensory: Sensory deficit present.      Comments:  [place] 5.07 monofilamant testing is diminished Kp feet. Decreased/absent vibratory sensation bilateral feet to 128Hz tuning fork.     Paresthesias, and hyperesthesia bilateral feet with no clearly identified trigger or source.           Psychiatric:         Attention and Perception: He is attentive.         Mood and Affect: Mood is not anxious. Affect is not inappropriate.         Speech: He is communicative. Speech is not slurred.         Behavior: Behavior is not combative.           1/25/22:  Wound 1: left lateral foot   Measurement: 0.3cmx0.3cmx0.1cm pre debridement  0.5cmx0.5cmx0.1cm post debridement.  Base: fibrogranular   Periwound skin: HPK  Drainage:none   Erythema: none   Probe: none, no bone exposed        Pre debridement       Post debridement             1/18/2022    significant bleeding in layers              12/28/2021 12/07/2021 11/16/2021    Appears preulcerative            10/26/2021          Assessment:           ICD-10-CM ICD-9-CM   1. Foot ulceration, left, with fat layer exposed  L97.522 707.15                Plan:         Recurrence of left foot ulceration.     Debridement: With verbal consent, nonviable tissues on the left foot were debrided to subq utilizing a  sterile No. 3 scalpel and forceps. Minimal bleeding controlled with direct pressure  The patient tolerated this well.     Dressings: Iodosorb, football  Offloading:Foam    Aerobic,anaerobic culture obtained.     Rx. Doxycycline     Left  foot xray to assess underlying deformity and for underlying osseous pathology.     Follow-up:Patient is to return to the clinic in 1 week  for follow-up but should call [facility] immediately if any signs of infection, such as fever, chills, sweats, increased redness or pain.    Short-term goals include maintaining good offloading and minimizing bioburden, promoting granulation and epithelialization to healing.  Long-term goals include keeping the wound healed by good offloading and medical management under the direction of internist.        Orders Placed This Encounter   Procedures    Aerobic culture (Specify Source)    CULTURE, ANAEROBE    X-Ray Foot Complete Left     Standing Status:   Future     Number of Occurrences:   1     Standing Expiration Date:   1/25/2023     Order Specific Question:   Reason for Exam:     Answer:   Left lateral foot ulceration        No follow-ups on file.

## 2022-01-27 LAB — BACTERIA SPEC AEROBE CULT: NORMAL

## 2022-01-29 ENCOUNTER — PATIENT MESSAGE (OUTPATIENT)
Dept: ENDOSCOPY | Facility: HOSPITAL | Age: 57
End: 2022-01-29
Payer: COMMERCIAL

## 2022-01-31 LAB — BACTERIA SPEC ANAEROBE CULT: NORMAL

## 2022-02-01 ENCOUNTER — OFFICE VISIT (OUTPATIENT)
Dept: PODIATRY | Facility: CLINIC | Age: 57
End: 2022-02-01
Payer: COMMERCIAL

## 2022-02-01 VITALS — WEIGHT: 240 LBS | HEIGHT: 73 IN | BODY MASS INDEX: 31.81 KG/M2

## 2022-02-01 DIAGNOSIS — L97.522 FOOT ULCERATION, LEFT, WITH FAT LAYER EXPOSED: Primary | ICD-10-CM

## 2022-02-01 DIAGNOSIS — E11.49 TYPE II DIABETES MELLITUS WITH NEUROLOGICAL MANIFESTATIONS: ICD-10-CM

## 2022-02-01 PROCEDURE — 99499 UNLISTED E&M SERVICE: CPT | Mod: S$GLB,,, | Performed by: PODIATRIST

## 2022-02-01 PROCEDURE — 99999 PR PBB SHADOW E&M-EST. PATIENT-LVL IV: CPT | Mod: PBBFAC,,, | Performed by: PODIATRIST

## 2022-02-01 PROCEDURE — 3008F BODY MASS INDEX DOCD: CPT | Mod: CPTII,S$GLB,, | Performed by: PODIATRIST

## 2022-02-01 PROCEDURE — 1160F RVW MEDS BY RX/DR IN RCRD: CPT | Mod: CPTII,S$GLB,, | Performed by: PODIATRIST

## 2022-02-01 PROCEDURE — 99999 PR PBB SHADOW E&M-EST. PATIENT-LVL IV: ICD-10-PCS | Mod: PBBFAC,,, | Performed by: PODIATRIST

## 2022-02-01 PROCEDURE — 1159F MED LIST DOCD IN RCRD: CPT | Mod: CPTII,S$GLB,, | Performed by: PODIATRIST

## 2022-02-01 PROCEDURE — 1160F PR REVIEW ALL MEDS BY PRESCRIBER/CLIN PHARMACIST DOCUMENTED: ICD-10-PCS | Mod: CPTII,S$GLB,, | Performed by: PODIATRIST

## 2022-02-01 PROCEDURE — 99499 NO LOS: ICD-10-PCS | Mod: S$GLB,,, | Performed by: PODIATRIST

## 2022-02-01 PROCEDURE — 11042 DBRDMT SUBQ TIS 1ST 20SQCM/<: CPT | Mod: S$GLB,,, | Performed by: PODIATRIST

## 2022-02-01 PROCEDURE — 1159F PR MEDICATION LIST DOCUMENTED IN MEDICAL RECORD: ICD-10-PCS | Mod: CPTII,S$GLB,, | Performed by: PODIATRIST

## 2022-02-01 PROCEDURE — 11042 WOUND DEBRIDEMENT: ICD-10-PCS | Mod: S$GLB,,, | Performed by: PODIATRIST

## 2022-02-01 PROCEDURE — 3008F PR BODY MASS INDEX (BMI) DOCUMENTED: ICD-10-PCS | Mod: CPTII,S$GLB,, | Performed by: PODIATRIST

## 2022-02-04 NOTE — PROGRESS NOTES
[facility]   Progress Note    Subjective:       Patient ID: Catalino Mcfadden is a 56 y.o. male.    Chief Complaint: Diabetes Mellitus (PCP Dr.Lombard 11/15/2021), Wound Check, and Follow-up    Patient ambulated to exam room with mepilex, tubigrip, and New Balance shoe on. No new pedal complaints    10/26/2021 patient presents to podiatry clinic s/p discharge from wound clinic.  He relates th has care for foot as instructed and relates subjectively remains healed however he has concerns that excessive ambulationon return to work may case wound to recur    11/16/2021 He relates th has care for foot as instructed and relates subjectively remains healed but has increased callus formation. He has bee padding the area and wearing new balance tennis shoes. He has not returned to work. He would like to discuss returning to work.    12/07/2021 he continues to cushion previous wound site and wear new balance tennis shoes. Subjectively remains healed but has significant callus formation.  He has not returned to work. He would like to discuss return to work timeline.    12/28/2021 he continues to cushion previous wound site and wear new balance tennis shoes. Subjectively remains healed but has significant callus formation.  He has not returned to work    1/18/2022 he continues to cushion previous wound site and wear new balance tennis shoes. Subjectively remains healed but has significant callus formation.  He has accepted a new position and began work yesterday. He no longer has to move [employer]    1/25/22: Reports noticing drainage left plantar lateral foot. Denies N/V/F/C. Patient presented as walk in to clinic    2/1/2022 Returns to clinic for follow up of  left foot ulcers.  Patient has left football dressing clean, dry, intact.  Patient denies pain. No new pedal complaints.      Wound Check    Follow-up  Associated symptoms include arthralgias, headaches, joint swelling, myalgias and numbness. Pertinent  "negatives include no chest pain, chills, coughing, fever, nausea, vomiting or weakness.       Review of Systems   Constitutional: Negative for appetite change, chills and fever.   Respiratory: Negative for cough, shortness of breath and wheezing.    Cardiovascular: Positive for leg swelling. Negative for chest pain.   Gastrointestinal: Negative for diarrhea, nausea and vomiting.   Musculoskeletal: Positive for arthralgias, joint swelling and myalgias.   Skin: Positive for wound.   Neurological: Positive for numbness and headaches. Negative for weakness.        + paresthesia          Objective:     Vitals:    02/01/22 1151   Weight: 108.9 kg (240 lb)   Height: 6' 1" (1.854 m)   PainSc: 0-No pain          Physical Exam  Vitals and nursing note reviewed.   Constitutional:       General: He is not in acute distress.     Appearance: He is not toxic-appearing or diaphoretic.      Comments: Pt. is well-developed, well-nourished, appears stated age, in no acute distress, alert and oriented x 3. No evidence of depression, anxiety, or agitation. Calm, cooperative, and communicative. Appropriate interactions and affect.   Cardiovascular:      Pulses:           Dorsalis pedis pulses are 2+ on the right side and 2+ on the left side.        Posterior tibial pulses are 1+ on the right side and 1+ on the left side.      Comments: There is decreased digital hair. Skin is atrophic, slightly hyperpigmented  Pulmonary:      Effort: No respiratory distress.   Musculoskeletal:      Right ankle: No swelling. No tenderness. No lateral malleolus, medial malleolus, AITF ligament, CF ligament or posterior TF ligament tenderness. Normal range of motion.      Right Achilles Tendon: No defects. Hilliard's test negative.      Left ankle: No swelling. No tenderness. No lateral malleolus, medial malleolus, AITF ligament, CF ligament or posterior TF ligament tenderness. Normal range of motion.      Left Achilles Tendon: No defects. Hilliard's test " negative.      Right foot: No tenderness or bony tenderness.      Left foot: No tenderness or bony tenderness.      Comments: Decreased stride, station of gait.  apropulsive toe off.  Increased angle and base of gait.    There is equinus deformity bilateral with decreased dorsiflexion at the ankle joint bilateral. No tenderness with compression of heel. Negative tinels sign. Gait analysis reveals excessive pronation through midstance and propulsion with early heel off.     Patient has hammertoes of digits 2-5 bilateral partially reducible     Decreased first MPJ range of motion both weightbearing and nonweightbearing, no crepitus observed the first MP joint, + dorsal flag sign.     Visible and palpable bunion without pain at dorsomedial 1st metatarsal head right and left.  Hallux abducted right and left partially reducible, tracks laterally without being track bound.  No ecchymosis, erythema, edema, or cardinal signs infection or signs of trauma same foot.    Fat pad atrophy to heels and met heads bilateral    Plantarflexed 1st ray RLE   Lymphadenopathy:      Comments: No lymphatic streaking    Negative lymphadenopathy bilateral popliteal fossa and tarsal tunnel.     Skin:     General: Skin is warm and dry.      Coloration: Skin is not pale.      Findings: Erythema and lesion (see wound description below) present. No abrasion, ecchymosis, laceration or rash.      Nails: There is no clubbing.      Comments: Toenails 1-5 bilaterally discolored/yellowed, dystrophic, brittle with subungual debris.    Neurological:      Sensory: Sensory deficit present.      Comments:  [place] 5.07 monofilamant testing is diminished Kp feet. Decreased/absent vibratory sensation bilateral feet to 128Hz tuning fork.     Paresthesias, and hyperesthesia bilateral feet with no clearly identified trigger or source.           Psychiatric:         Attention and Perception: He is attentive.         Mood and Affect: Mood is not anxious.  Affect is not inappropriate.         Speech: He is communicative. Speech is not slurred.         Behavior: Behavior is not combative.               02/01/22:  Wound 1: left lateral foot   Measurement: 1.4cmx0.6cmx0.2cm  Base: fibrogranular   Periwound skin: maceration and HPK  Drainage: serosanguinous  Erythema: none   Probe: none, no bone exposed                1/25/22:  Wound 1: left lateral foot   Measurement: 0.3cmx0.3cmx0.1cm pre debridement 0.5cmx0.5cmx0.1cm post debridement.  Base: fibrogranular   Periwound skin: HPK  Drainage:none   Erythema: none   Probe: none, no bone exposed        Pre debridement       Post debridement             1/18/2022    significant bleeding in layers              12/28/2021 12/07/2021 11/16/2021    Appears preulcerative            10/26/2021          Assessment:           ICD-10-CM ICD-9-CM   1. Foot ulceration, left, with fat layer exposed  L97.522 707.15   2. Type II diabetes mellitus with neurological manifestations  E11.49 250.60                Plan:         Recurrence of left foot ulceration.   Education about the prevention of limb loss.    Discussed wound healing cycle, skin integrity, ways to care for skin.Counseled patient on the effects of pressure and blood glucose on healing. He verbalizes understanding that it can increase the chances of delayed healing and this prolonged exposure leads to infection or progression of infection which subsequently can result in loss of limb.    Adequate vitamin supplementation, protein intake, and hydration - discussed with patient    The wound is cleansed of foreign material as much as possible and the base inspected for bone or abscess. Base is granular and without bone nor joint exposure    Dressings: maceration periwound, Iodosorb to bed, football  Offloading:Foam    Follow-up: 1 week but should call [facility] immediately if any signs of infection, such as fever, chills, sweats, increased redness or  "pain.    Short-term goals include maintaining good offloading and minimizing bioburden, promoting granulation and epithelialization to healing.  Long-term goals include keeping the wound healed by good offloading and medical management under the direction of internist.    Shoe inspection. Diabetic Foot Education. Patient reminded of the importance of good nutrition and blood sugar control to help prevent podiatric complications of diabetes. Patient instructed on proper foot hygeine. We discussed wearing proper shoe gear, daily foot inspections, never walking without protective shoe gear, never putting sharp instruments to feet.            Orders Placed This Encounter   Procedures    Wound Debridement     This order was created via procedure documentation        No follow-ups on file.         Wound Debridement    Date/Time: 2/1/2022 11:15 AM  Performed by: Aviva Martinez DPM  Authorized by: Aviva Martinez DPM     Time out: Immediately prior to procedure a "time out" was called to verify the correct patient, procedure, equipment, support staff and site/side marked as required.    Consent Done?:  Yes (Verbal)    Preparation: Patient was prepped and draped in usual sterile fashion    Local anesthesia used?: No      Wound Details:    Location:  Left foot    Location:  Left Midfoot    Type of Debridement:  Excisional       Length (cm):  1.4       Area (sq cm):  0.84       Width (cm):  0.6       Percent Debrided (%):  100       Depth (cm):  0.2       Total Area Debrided (sq cm):  0.84    Depth of debridement:  Subcutaneous tissue    Tissue debrided:  Epidermis, Dermis and Subcutaneous    Devitalized tissue debrided:  Biofilm, Clots and Fibrin    Instruments:  Curette    Bleeding:  Minimal  Hemostasis Achieved: Yes    Method Used:  Pressure  Patient tolerance:  Patient tolerated the procedure well with no immediate complications                "

## 2022-02-08 ENCOUNTER — OFFICE VISIT (OUTPATIENT)
Dept: PODIATRY | Facility: CLINIC | Age: 57
End: 2022-02-08
Payer: COMMERCIAL

## 2022-02-08 VITALS — BODY MASS INDEX: 31.81 KG/M2 | HEIGHT: 73 IN | WEIGHT: 240 LBS

## 2022-02-08 DIAGNOSIS — L97.522 FOOT ULCERATION, LEFT, WITH FAT LAYER EXPOSED: Primary | ICD-10-CM

## 2022-02-08 DIAGNOSIS — E11.49 TYPE II DIABETES MELLITUS WITH NEUROLOGICAL MANIFESTATIONS: ICD-10-CM

## 2022-02-08 PROCEDURE — 99999 PR PBB SHADOW E&M-EST. PATIENT-LVL IV: ICD-10-PCS | Mod: PBBFAC,,, | Performed by: PODIATRIST

## 2022-02-08 PROCEDURE — 99999 PR PBB SHADOW E&M-EST. PATIENT-LVL IV: CPT | Mod: PBBFAC,,, | Performed by: PODIATRIST

## 2022-02-08 PROCEDURE — 1159F MED LIST DOCD IN RCRD: CPT | Mod: CPTII,S$GLB,, | Performed by: PODIATRIST

## 2022-02-08 PROCEDURE — 1160F PR REVIEW ALL MEDS BY PRESCRIBER/CLIN PHARMACIST DOCUMENTED: ICD-10-PCS | Mod: CPTII,S$GLB,, | Performed by: PODIATRIST

## 2022-02-08 PROCEDURE — 3008F PR BODY MASS INDEX (BMI) DOCUMENTED: ICD-10-PCS | Mod: CPTII,S$GLB,, | Performed by: PODIATRIST

## 2022-02-08 PROCEDURE — 99213 OFFICE O/P EST LOW 20 MIN: CPT | Mod: S$GLB,,, | Performed by: PODIATRIST

## 2022-02-08 PROCEDURE — 3008F BODY MASS INDEX DOCD: CPT | Mod: CPTII,S$GLB,, | Performed by: PODIATRIST

## 2022-02-08 PROCEDURE — 99213 PR OFFICE/OUTPT VISIT, EST, LEVL III, 20-29 MIN: ICD-10-PCS | Mod: S$GLB,,, | Performed by: PODIATRIST

## 2022-02-08 PROCEDURE — 1159F PR MEDICATION LIST DOCUMENTED IN MEDICAL RECORD: ICD-10-PCS | Mod: CPTII,S$GLB,, | Performed by: PODIATRIST

## 2022-02-08 PROCEDURE — 1160F RVW MEDS BY RX/DR IN RCRD: CPT | Mod: CPTII,S$GLB,, | Performed by: PODIATRIST

## 2022-02-08 NOTE — PROGRESS NOTES
[facility]   Progress Note    Subjective:       Patient ID: Catalino Mcfadden is a 56 y.o. male.    Chief Complaint: Foot Problem, Wound Check, and Follow-up    Patient ambulated to exam room with mepilex, tubigrip, and New Balance shoe on. No new pedal complaints    10/26/2021 patient presents to podiatry clinic s/p discharge from wound clinic.  He relates th has care for foot as instructed and relates subjectively remains healed however he has concerns that excessive ambulationon return to work may case wound to recur    11/16/2021 He relates th has care for foot as instructed and relates subjectively remains healed but has increased callus formation. He has bee padding the area and wearing new balance tennis shoes. He has not returned to work. He would like to discuss returning to work.    12/07/2021 he continues to cushion previous wound site and wear new balance tennis shoes. Subjectively remains healed but has significant callus formation.  He has not returned to work. He would like to discuss return to work timeline.    12/28/2021 he continues to cushion previous wound site and wear new balance tennis shoes. Subjectively remains healed but has significant callus formation.  He has not returned to work    1/18/2022 he continues to cushion previous wound site and wear new balance tennis shoes. Subjectively remains healed but has significant callus formation.  He has accepted a new position and began work yesterday. He no longer has to move [employer]    1/25/22: Reports noticing drainage left plantar lateral foot. Denies N/V/F/C. Patient presented as walk in to clinic    2/1/2022 Returns to clinic for follow up of  left foot ulcers.  Patient has left football dressing clean, dry, intact.  Patient denies pain. No new pedal complaints.    02/08/2022 Returns to clinic for follow up of  left foot ulcers.  Patient has left football dressing clean, dry, intact.  Patient denies pain. No new pedal  "complaints.        Wound Check    Follow-up  Associated symptoms include arthralgias, headaches, joint swelling, myalgias and numbness. Pertinent negatives include no chest pain, chills, coughing, fever, nausea, vomiting or weakness.       Review of Systems   Constitutional: Negative for appetite change, chills and fever.   Respiratory: Negative for cough, shortness of breath and wheezing.    Cardiovascular: Positive for leg swelling. Negative for chest pain.   Gastrointestinal: Negative for diarrhea, nausea and vomiting.   Musculoskeletal: Positive for arthralgias, joint swelling and myalgias.   Skin: Positive for wound.   Neurological: Positive for numbness and headaches. Negative for weakness.        + paresthesia          Objective:     Vitals:    02/08/22 1151   Weight: 108.9 kg (240 lb)   Height: 6' 1" (1.854 m)   PainSc: 0-No pain          Physical Exam  Vitals and nursing note reviewed.   Constitutional:       General: He is not in acute distress.     Appearance: He is not toxic-appearing or diaphoretic.      Comments: Pt. is well-developed, well-nourished, appears stated age, in no acute distress, alert and oriented x 3. No evidence of depression, anxiety, or agitation. Calm, cooperative, and communicative. Appropriate interactions and affect.   Cardiovascular:      Pulses:           Dorsalis pedis pulses are 2+ on the right side and 2+ on the left side.        Posterior tibial pulses are 1+ on the right side and 1+ on the left side.      Comments: There is decreased digital hair. Skin is atrophic, slightly hyperpigmented  Pulmonary:      Effort: No respiratory distress.   Musculoskeletal:      Right ankle: No swelling. No tenderness. No lateral malleolus, medial malleolus, AITF ligament, CF ligament or posterior TF ligament tenderness. Normal range of motion.      Right Achilles Tendon: No defects. Hilliard's test negative.      Left ankle: No swelling. No tenderness. No lateral malleolus, medial " malleolus, AITF ligament, CF ligament or posterior TF ligament tenderness. Normal range of motion.      Left Achilles Tendon: No defects. Hilliard's test negative.      Right foot: No tenderness or bony tenderness.      Left foot: No tenderness or bony tenderness.      Comments: Decreased stride, station of gait.  apropulsive toe off.  Increased angle and base of gait.    There is equinus deformity bilateral with decreased dorsiflexion at the ankle joint bilateral. No tenderness with compression of heel. Negative tinels sign. Gait analysis reveals excessive pronation through midstance and propulsion with early heel off.     Patient has hammertoes of digits 2-5 bilateral partially reducible     Decreased first MPJ range of motion both weightbearing and nonweightbearing, no crepitus observed the first MP joint, + dorsal flag sign.     Visible and palpable bunion without pain at dorsomedial 1st metatarsal head right and left.  Hallux abducted right and left partially reducible, tracks laterally without being track bound.  No ecchymosis, erythema, edema, or cardinal signs infection or signs of trauma same foot.    Fat pad atrophy to heels and met heads bilateral    Plantarflexed 1st ray RLE   Lymphadenopathy:      Comments: No lymphatic streaking    Negative lymphadenopathy bilateral popliteal fossa and tarsal tunnel.     Skin:     General: Skin is warm and dry.      Coloration: Skin is not pale.      Findings: Erythema and lesion (see wound description below) present. No abrasion, ecchymosis, laceration or rash.      Nails: There is no clubbing.      Comments: Toenails 1-5 bilaterally discolored/yellowed, dystrophic, brittle with subungual debris.    Neurological:      Sensory: Sensory deficit present.      Comments:  [place] 5.07 monofilamant testing is diminished Kp feet. Decreased/absent vibratory sensation bilateral feet to 128Hz tuning fork.     Paresthesias, and hyperesthesia bilateral feet with no  clearly identified trigger or source.           Psychiatric:         Attention and Perception: He is attentive.         Mood and Affect: Mood is not anxious. Affect is not inappropriate.         Speech: He is communicative. Speech is not slurred.         Behavior: Behavior is not combative.             02/08/22:  Wound 1: left lateral foot   Measurement: 0.9cmx0.4cmx0.2cm  Base: fibrogranular   Periwound skin: maceration and HPK  Drainage: serosanguinous  Erythema: none   Probe: none, no bone exposed          02/01/22:  Wound 1: left lateral foot   Measurement: 1.4cmx0.6cmx0.2cm  Base: fibrogranular   Periwound skin: maceration and HPK  Drainage: serosanguinous  Erythema: none   Probe: none, no bone exposed                1/25/22:  Wound 1: left lateral foot   Measurement: 0.3cmx0.3cmx0.1cm pre debridement 0.5cmx0.5cmx0.1cm post debridement.  Base: fibrogranular   Periwound skin: HPK  Drainage:none   Erythema: none   Probe: none, no bone exposed        Pre debridement       Post debridement             1/18/2022    significant bleeding in layers              12/28/2021 12/07/2021 11/16/2021    Appears preulcerative            10/26/2021          Assessment:           ICD-10-CM ICD-9-CM   1. Foot ulceration, left, with fat layer exposed  L97.522 707.15   2. Type II diabetes mellitus with neurological manifestations  E11.49 250.60                Plan:         Recurrence of left foot ulceration.   Education about the prevention of limb loss.    Discussed wound healing cycle, skin integrity, ways to care for skin.Counseled patient on the effects of pressure and blood glucose on healing. He verbalizes understanding that it can increase the chances of delayed healing and this prolonged exposure leads to infection or progression of infection which subsequently can result in loss of limb.    Adequate vitamin supplementation, protein intake, and hydration - discussed with patient    The wound is  cleansed of foreign material as much as possible and the base inspected for bone or abscess. Base is granular and without bone nor joint exposure    Improved    Dressings: maceration periwound, Gentian violet periwound,  Iodosorb to bed, breathable football with Tubigrip  Offloading:Foam    Follow-up: 1 week but should call [facility] immediately if any signs of infection, such as fever, chills, sweats, increased redness or pain.    Short-term goals include maintaining good offloading and minimizing bioburden, promoting granulation and epithelialization to healing.  Long-term goals include keeping the wound healed by good offloading and medical management under the direction of internist.    Shoe inspection. Diabetic Foot Education. Patient reminded of the importance of good nutrition and blood sugar control to help prevent podiatric complications of diabetes. Patient instructed on proper foot hygeine. We discussed wearing proper shoe gear, daily foot inspections, never walking without protective shoe gear, never putting sharp instruments to feet.            No orders of the defined types were placed in this encounter.       No follow-ups on file.         Procedures

## 2022-02-09 ENCOUNTER — LAB VISIT (OUTPATIENT)
Dept: LAB | Facility: HOSPITAL | Age: 57
End: 2022-02-09
Attending: INTERNAL MEDICINE
Payer: COMMERCIAL

## 2022-02-09 DIAGNOSIS — Z00.00 WELL ADULT EXAM: ICD-10-CM

## 2022-02-09 DIAGNOSIS — E55.9 VITAMIN D DEFICIENCY: ICD-10-CM

## 2022-02-09 DIAGNOSIS — Z79.4 TYPE 2 DIABETES MELLITUS WITH STAGE 5 CHRONIC KIDNEY DISEASE NOT ON CHRONIC DIALYSIS, WITH LONG-TERM CURRENT USE OF INSULIN: ICD-10-CM

## 2022-02-09 DIAGNOSIS — N18.4 CHRONIC KIDNEY DISEASE, STAGE IV (SEVERE): ICD-10-CM

## 2022-02-09 DIAGNOSIS — N18.5 TYPE 2 DIABETES MELLITUS WITH STAGE 5 CHRONIC KIDNEY DISEASE NOT ON CHRONIC DIALYSIS, WITH LONG-TERM CURRENT USE OF INSULIN: ICD-10-CM

## 2022-02-09 DIAGNOSIS — I10 ESSENTIAL HYPERTENSION: ICD-10-CM

## 2022-02-09 DIAGNOSIS — N18.9 ANEMIA OF RENAL DISEASE: ICD-10-CM

## 2022-02-09 DIAGNOSIS — E11.22 TYPE 2 DIABETES MELLITUS WITH STAGE 5 CHRONIC KIDNEY DISEASE NOT ON CHRONIC DIALYSIS, WITH LONG-TERM CURRENT USE OF INSULIN: ICD-10-CM

## 2022-02-09 DIAGNOSIS — D63.1 ANEMIA OF RENAL DISEASE: ICD-10-CM

## 2022-02-09 LAB
25(OH)D3+25(OH)D2 SERPL-MCNC: 12 NG/ML (ref 30–96)
ALBUMIN SERPL BCP-MCNC: 3 G/DL (ref 3.5–5.2)
ALBUMIN SERPL BCP-MCNC: 3 G/DL (ref 3.5–5.2)
ALP SERPL-CCNC: 272 U/L (ref 55–135)
ALP SERPL-CCNC: 272 U/L (ref 55–135)
ALT SERPL W/O P-5'-P-CCNC: 18 U/L (ref 10–44)
ALT SERPL W/O P-5'-P-CCNC: 18 U/L (ref 10–44)
ANION GAP SERPL CALC-SCNC: 10 MMOL/L (ref 8–16)
ANION GAP SERPL CALC-SCNC: 10 MMOL/L (ref 8–16)
AST SERPL-CCNC: 23 U/L (ref 10–40)
AST SERPL-CCNC: 23 U/L (ref 10–40)
BASOPHILS # BLD AUTO: 0.03 K/UL (ref 0–0.2)
BASOPHILS # BLD AUTO: 0.03 K/UL (ref 0–0.2)
BASOPHILS NFR BLD: 0.6 % (ref 0–1.9)
BASOPHILS NFR BLD: 0.6 % (ref 0–1.9)
BILIRUB SERPL-MCNC: 0.7 MG/DL (ref 0.1–1)
BILIRUB SERPL-MCNC: 0.7 MG/DL (ref 0.1–1)
BUN SERPL-MCNC: 34 MG/DL (ref 6–20)
BUN SERPL-MCNC: 34 MG/DL (ref 6–20)
CALCIUM SERPL-MCNC: 9.5 MG/DL (ref 8.7–10.5)
CALCIUM SERPL-MCNC: 9.5 MG/DL (ref 8.7–10.5)
CHLORIDE SERPL-SCNC: 93 MMOL/L (ref 95–110)
CHLORIDE SERPL-SCNC: 93 MMOL/L (ref 95–110)
CHOLEST SERPL-MCNC: 199 MG/DL (ref 120–199)
CHOLEST/HDLC SERPL: 3.2 {RATIO} (ref 2–5)
CO2 SERPL-SCNC: 29 MMOL/L (ref 23–29)
CO2 SERPL-SCNC: 29 MMOL/L (ref 23–29)
COMPLEXED PSA SERPL-MCNC: 1.5 NG/ML (ref 0–4)
CREAT SERPL-MCNC: 6.5 MG/DL (ref 0.5–1.4)
CREAT SERPL-MCNC: 6.5 MG/DL (ref 0.5–1.4)
DIFFERENTIAL METHOD: ABNORMAL
DIFFERENTIAL METHOD: ABNORMAL
EOSINOPHIL # BLD AUTO: 0.1 K/UL (ref 0–0.5)
EOSINOPHIL # BLD AUTO: 0.1 K/UL (ref 0–0.5)
EOSINOPHIL NFR BLD: 2.4 % (ref 0–8)
EOSINOPHIL NFR BLD: 2.4 % (ref 0–8)
ERYTHROCYTE [DISTWIDTH] IN BLOOD BY AUTOMATED COUNT: 16.4 % (ref 11.5–14.5)
ERYTHROCYTE [DISTWIDTH] IN BLOOD BY AUTOMATED COUNT: 16.4 % (ref 11.5–14.5)
EST. GFR  (AFRICAN AMERICAN): 10 ML/MIN/1.73 M^2
EST. GFR  (AFRICAN AMERICAN): 10 ML/MIN/1.73 M^2
EST. GFR  (NON AFRICAN AMERICAN): 8.7 ML/MIN/1.73 M^2
EST. GFR  (NON AFRICAN AMERICAN): 8.7 ML/MIN/1.73 M^2
ESTIMATED AVG GLUCOSE: 97 MG/DL (ref 68–131)
FERRITIN SERPL-MCNC: 582 NG/ML (ref 20–300)
GLUCOSE SERPL-MCNC: 154 MG/DL (ref 70–110)
GLUCOSE SERPL-MCNC: 154 MG/DL (ref 70–110)
HBA1C MFR BLD: 5 % (ref 4–5.6)
HCT VFR BLD AUTO: 37.3 % (ref 40–54)
HCT VFR BLD AUTO: 37.3 % (ref 40–54)
HDLC SERPL-MCNC: 63 MG/DL (ref 40–75)
HDLC SERPL: 31.7 % (ref 20–50)
HGB BLD-MCNC: 11.7 G/DL (ref 14–18)
HGB BLD-MCNC: 11.7 G/DL (ref 14–18)
IMM GRANULOCYTES # BLD AUTO: 0.04 K/UL (ref 0–0.04)
IMM GRANULOCYTES # BLD AUTO: 0.04 K/UL (ref 0–0.04)
IMM GRANULOCYTES NFR BLD AUTO: 0.8 % (ref 0–0.5)
IMM GRANULOCYTES NFR BLD AUTO: 0.8 % (ref 0–0.5)
IRON SERPL-MCNC: 82 UG/DL (ref 45–160)
LDLC SERPL CALC-MCNC: 123.4 MG/DL (ref 63–159)
LYMPHOCYTES # BLD AUTO: 0.9 K/UL (ref 1–4.8)
LYMPHOCYTES # BLD AUTO: 0.9 K/UL (ref 1–4.8)
LYMPHOCYTES NFR BLD: 17.2 % (ref 18–48)
LYMPHOCYTES NFR BLD: 17.2 % (ref 18–48)
MCH RBC QN AUTO: 28.7 PG (ref 27–31)
MCH RBC QN AUTO: 28.7 PG (ref 27–31)
MCHC RBC AUTO-ENTMCNC: 31.4 G/DL (ref 32–36)
MCHC RBC AUTO-ENTMCNC: 31.4 G/DL (ref 32–36)
MCV RBC AUTO: 92 FL (ref 82–98)
MCV RBC AUTO: 92 FL (ref 82–98)
MONOCYTES # BLD AUTO: 0.7 K/UL (ref 0.3–1)
MONOCYTES # BLD AUTO: 0.7 K/UL (ref 0.3–1)
MONOCYTES NFR BLD: 13.8 % (ref 4–15)
MONOCYTES NFR BLD: 13.8 % (ref 4–15)
NEUTROPHILS # BLD AUTO: 3.3 K/UL (ref 1.8–7.7)
NEUTROPHILS # BLD AUTO: 3.3 K/UL (ref 1.8–7.7)
NEUTROPHILS NFR BLD: 65.2 % (ref 38–73)
NEUTROPHILS NFR BLD: 65.2 % (ref 38–73)
NONHDLC SERPL-MCNC: 136 MG/DL
NRBC BLD-RTO: 0 /100 WBC
NRBC BLD-RTO: 0 /100 WBC
PLATELET # BLD AUTO: 146 K/UL (ref 150–450)
PLATELET # BLD AUTO: 146 K/UL (ref 150–450)
PMV BLD AUTO: 10 FL (ref 9.2–12.9)
PMV BLD AUTO: 10 FL (ref 9.2–12.9)
POTASSIUM SERPL-SCNC: 4.1 MMOL/L (ref 3.5–5.1)
POTASSIUM SERPL-SCNC: 4.1 MMOL/L (ref 3.5–5.1)
PROT SERPL-MCNC: 7.4 G/DL (ref 6–8.4)
PROT SERPL-MCNC: 7.4 G/DL (ref 6–8.4)
RBC # BLD AUTO: 4.07 M/UL (ref 4.6–6.2)
RBC # BLD AUTO: 4.07 M/UL (ref 4.6–6.2)
SATURATED IRON: 27 % (ref 20–50)
SODIUM SERPL-SCNC: 132 MMOL/L (ref 136–145)
SODIUM SERPL-SCNC: 132 MMOL/L (ref 136–145)
T4 FREE SERPL-MCNC: 1.02 NG/DL (ref 0.71–1.51)
TOTAL IRON BINDING CAPACITY: 306 UG/DL (ref 250–450)
TRANSFERRIN SERPL-MCNC: 207 MG/DL (ref 200–375)
TRIGL SERPL-MCNC: 63 MG/DL (ref 30–150)
TSH SERPL DL<=0.005 MIU/L-ACNC: 1.78 UIU/ML (ref 0.4–4)
WBC # BLD AUTO: 4.99 K/UL (ref 3.9–12.7)
WBC # BLD AUTO: 4.99 K/UL (ref 3.9–12.7)

## 2022-02-09 PROCEDURE — 82306 VITAMIN D 25 HYDROXY: CPT | Mod: TXP | Performed by: FAMILY MEDICINE

## 2022-02-09 PROCEDURE — 84443 ASSAY THYROID STIM HORMONE: CPT | Mod: NTX | Performed by: FAMILY MEDICINE

## 2022-02-09 PROCEDURE — 84466 ASSAY OF TRANSFERRIN: CPT | Mod: TXP | Performed by: INTERNAL MEDICINE

## 2022-02-09 PROCEDURE — 86706 HEP B SURFACE ANTIBODY: CPT | Mod: TXP | Performed by: INTERNAL MEDICINE

## 2022-02-09 PROCEDURE — 85025 COMPLETE CBC W/AUTO DIFF WBC: CPT | Mod: TXP | Performed by: INTERNAL MEDICINE

## 2022-02-09 PROCEDURE — 86803 HEPATITIS C AB TEST: CPT | Mod: TXP | Performed by: INTERNAL MEDICINE

## 2022-02-09 PROCEDURE — 82728 ASSAY OF FERRITIN: CPT | Mod: TXP | Performed by: INTERNAL MEDICINE

## 2022-02-09 PROCEDURE — 84153 ASSAY OF PSA TOTAL: CPT | Mod: NTX | Performed by: FAMILY MEDICINE

## 2022-02-09 PROCEDURE — 84439 ASSAY OF FREE THYROXINE: CPT | Mod: TXP | Performed by: FAMILY MEDICINE

## 2022-02-09 PROCEDURE — 36415 COLL VENOUS BLD VENIPUNCTURE: CPT | Mod: PO,TXP | Performed by: FAMILY MEDICINE

## 2022-02-09 PROCEDURE — 80061 LIPID PANEL: CPT | Mod: NTX | Performed by: FAMILY MEDICINE

## 2022-02-09 PROCEDURE — 83036 HEMOGLOBIN GLYCOSYLATED A1C: CPT | Mod: TXP | Performed by: FAMILY MEDICINE

## 2022-02-09 PROCEDURE — 80053 COMPREHEN METABOLIC PANEL: CPT | Mod: TXP | Performed by: INTERNAL MEDICINE

## 2022-02-11 LAB
HBV SURFACE AB SER-ACNC: NEGATIVE M[IU]/ML
HCV AB SERPL QL IA: NEGATIVE

## 2022-02-14 ENCOUNTER — INITIAL CONSULT (OUTPATIENT)
Dept: VASCULAR SURGERY | Facility: CLINIC | Age: 57
End: 2022-02-14
Payer: COMMERCIAL

## 2022-02-14 VITALS
SYSTOLIC BLOOD PRESSURE: 160 MMHG | DIASTOLIC BLOOD PRESSURE: 80 MMHG | WEIGHT: 258.19 LBS | BODY MASS INDEX: 34.06 KG/M2

## 2022-02-14 DIAGNOSIS — Z79.4 TYPE 2 DIABETES MELLITUS WITH STAGE 5 CHRONIC KIDNEY DISEASE NOT ON CHRONIC DIALYSIS, WITH LONG-TERM CURRENT USE OF INSULIN: ICD-10-CM

## 2022-02-14 DIAGNOSIS — N18.5 TYPE 2 DIABETES MELLITUS WITH STAGE 5 CHRONIC KIDNEY DISEASE NOT ON CHRONIC DIALYSIS, WITH LONG-TERM CURRENT USE OF INSULIN: ICD-10-CM

## 2022-02-14 DIAGNOSIS — E11.22 TYPE 2 DIABETES MELLITUS WITH STAGE 5 CHRONIC KIDNEY DISEASE NOT ON CHRONIC DIALYSIS, WITH LONG-TERM CURRENT USE OF INSULIN: ICD-10-CM

## 2022-02-14 DIAGNOSIS — N18.6 ESRD (END STAGE RENAL DISEASE): Primary | ICD-10-CM

## 2022-02-14 PROCEDURE — 99999 PR PBB SHADOW E&M-EST. PATIENT-LVL III: ICD-10-PCS | Mod: PBBFAC,TXP,, | Performed by: SURGERY

## 2022-02-14 PROCEDURE — 99214 PR OFFICE/OUTPT VISIT, EST, LEVL IV, 30-39 MIN: ICD-10-PCS | Mod: NTX,S$GLB,, | Performed by: SURGERY

## 2022-02-14 PROCEDURE — 99999 PR PBB SHADOW E&M-EST. PATIENT-LVL III: CPT | Mod: PBBFAC,TXP,, | Performed by: SURGERY

## 2022-02-14 PROCEDURE — 99214 OFFICE O/P EST MOD 30 MIN: CPT | Mod: NTX,S$GLB,, | Performed by: SURGERY

## 2022-02-14 NOTE — LETTER
February 14, 2022        Chloe Jean MD  [address] [facility] [address]             [facility] [facility]  [address]  [facility] [address]  Phone: [phone]  Fax: [phone]   Patient: Caatlino Mcfadden   MR Number: [id number]   YOB: 1965   Date of Visit: 2/14/2022       Dear Dr. Jean:    Thank you for referring Catalino Mcfadden to me for evaluation. Below are the relevant portions of my assessment and plan of care.            If you have questions, please do not hesitate to call me. I look forward to following Catalino along with you.    Sincerely,      Daniel Poon MD           CC  Chloe Jean MD

## 2022-02-21 ENCOUNTER — PATIENT OUTREACH (OUTPATIENT)
Dept: ADMINISTRATIVE | Facility: OTHER | Age: 57
End: 2022-02-21
Payer: COMMERCIAL

## 2022-02-21 NOTE — PROGRESS NOTES
Spoke to patient to complete his history before his upcoming appointment his brother will come with him to his appointment he is aware that he have to bring a small breakfast/snack to eat after blood is drawn he will not need a

## 2022-02-22 ENCOUNTER — OFFICE VISIT (OUTPATIENT)
Dept: PODIATRY | Facility: CLINIC | Age: 57
End: 2022-02-22
Payer: COMMERCIAL

## 2022-02-22 VITALS — HEIGHT: 73 IN | BODY MASS INDEX: 34.19 KG/M2 | WEIGHT: 258 LBS

## 2022-02-22 DIAGNOSIS — E11.49 TYPE II DIABETES MELLITUS WITH NEUROLOGICAL MANIFESTATIONS: Primary | ICD-10-CM

## 2022-02-22 DIAGNOSIS — L97.522 FOOT ULCERATION, LEFT, WITH FAT LAYER EXPOSED: ICD-10-CM

## 2022-02-22 PROCEDURE — 1159F PR MEDICATION LIST DOCUMENTED IN MEDICAL RECORD: ICD-10-PCS | Mod: CPTII,S$GLB,, | Performed by: PODIATRIST

## 2022-02-22 PROCEDURE — 1160F RVW MEDS BY RX/DR IN RCRD: CPT | Mod: CPTII,S$GLB,, | Performed by: PODIATRIST

## 2022-02-22 PROCEDURE — 99999 PR PBB SHADOW E&M-EST. PATIENT-LVL IV: ICD-10-PCS | Mod: PBBFAC,,, | Performed by: PODIATRIST

## 2022-02-22 PROCEDURE — 99214 PR OFFICE/OUTPT VISIT, EST, LEVL IV, 30-39 MIN: ICD-10-PCS | Mod: S$GLB,,, | Performed by: PODIATRIST

## 2022-02-22 PROCEDURE — 3008F PR BODY MASS INDEX (BMI) DOCUMENTED: ICD-10-PCS | Mod: CPTII,S$GLB,, | Performed by: PODIATRIST

## 2022-02-22 PROCEDURE — 99999 PR PBB SHADOW E&M-EST. PATIENT-LVL IV: CPT | Mod: PBBFAC,,, | Performed by: PODIATRIST

## 2022-02-22 PROCEDURE — 3044F PR MOST RECENT HEMOGLOBIN A1C LEVEL <7.0%: ICD-10-PCS | Mod: CPTII,S$GLB,, | Performed by: PODIATRIST

## 2022-02-22 PROCEDURE — 1159F MED LIST DOCD IN RCRD: CPT | Mod: CPTII,S$GLB,, | Performed by: PODIATRIST

## 2022-02-22 PROCEDURE — 1160F PR REVIEW ALL MEDS BY PRESCRIBER/CLIN PHARMACIST DOCUMENTED: ICD-10-PCS | Mod: CPTII,S$GLB,, | Performed by: PODIATRIST

## 2022-02-22 PROCEDURE — 3008F BODY MASS INDEX DOCD: CPT | Mod: CPTII,S$GLB,, | Performed by: PODIATRIST

## 2022-02-22 PROCEDURE — 99214 OFFICE O/P EST MOD 30 MIN: CPT | Mod: S$GLB,,, | Performed by: PODIATRIST

## 2022-02-22 PROCEDURE — 3044F HG A1C LEVEL LT 7.0%: CPT | Mod: CPTII,S$GLB,, | Performed by: PODIATRIST

## 2022-02-22 NOTE — PATIENT INSTRUCTIONS
Please keep football dressing clean, dry, and intact.  If dressing gets wet please contact our office.    Wear special shoe every time foot is placed on the floor.    Elevate affected foot as much as possible    Stay hydrated.      Nutrition and MyPlate: Protein Foods  This group includes foods that are high in protein. Protein helps the body build new cells and keeps tissues healthy. Most Americans get enough protein without even trying. It can be harder for vegetarians, but plenty of non-meat foods are rich in protein, too. Its best to get protein from a variety of sources.    Nutrient-Rich Choices  Theres a lot more to this food group than just meat and beans. It also includes nuts, seeds, and eggs. There are all sorts of nutrient-rich choices:  Chicken and turkey with the skin removed  Fish and shellfish  Lean beef, pork, or lamb (without visible fat)  Soy products, such as tofu, soybeans (edamame), tempeh, or soymilk  Black beans, kidney beans, corrigan beans, chickpeas (garbanzo beans), and lentils (Note: beans and peas count as both a protein and a vegetable)  Peanuts, almonds, walnuts, sesame seeds, and sunflower  seeds, as well as foods made from these (such as peanut butter or tahini)  Eggs and foods made with eggs (such as quiche or frittata)  What Makes Meat and Beans Less Healthy?  Fatty meat is not healthy. Before you cook meat, trim off all the fat you can see. Chicken and turkey skin is also high in fat, and should be removed before cooking.  Breading and frying make food less healthy. This includes dishes like fried chicken, fried fish, and refried beans.  Sausage and lunch meats tend to be high in fat and salt. Buy low-fat, low-sodium versions.  One Small Change  Make a meal that includes a non-meat source of protein (such as tofu, lentils, or any other food listed above). Have a better idea? Write it here:  _____________________________________________________________  © 2545-0562 [facility]  [employer]. [address] [place] [address], [place] [address]. All rights reserved. This information is not intended as a substitute for professional medical care. Always follow your healthcare professional's instructions.

## 2022-02-23 ENCOUNTER — OFFICE VISIT (OUTPATIENT)
Dept: TRANSPLANT | Facility: CLINIC | Age: 57
End: 2022-02-23
Payer: COMMERCIAL

## 2022-02-23 ENCOUNTER — LAB VISIT (OUTPATIENT)
Dept: LAB | Facility: HOSPITAL | Age: 57
End: 2022-02-23
Attending: NURSE PRACTITIONER
Payer: COMMERCIAL

## 2022-02-23 VITALS
HEART RATE: 79 BPM | TEMPERATURE: 98 F | BODY MASS INDEX: 33.98 KG/M2 | RESPIRATION RATE: 16 BRPM | SYSTOLIC BLOOD PRESSURE: 192 MMHG | WEIGHT: 250.88 LBS | OXYGEN SATURATION: 99 % | DIASTOLIC BLOOD PRESSURE: 94 MMHG | HEIGHT: 72 IN

## 2022-02-23 DIAGNOSIS — I10 ESSENTIAL HYPERTENSION: Chronic | ICD-10-CM

## 2022-02-23 DIAGNOSIS — E11.621 DIABETIC ULCER OF TOE OF LEFT FOOT ASSOCIATED WITH TYPE 2 DIABETES MELLITUS, WITH OTHER ULCER SEVERITY: ICD-10-CM

## 2022-02-23 DIAGNOSIS — Z76.82 ORGAN TRANSPLANT CANDIDATE: ICD-10-CM

## 2022-02-23 DIAGNOSIS — L97.528 DIABETIC ULCER OF TOE OF LEFT FOOT ASSOCIATED WITH TYPE 2 DIABETES MELLITUS, WITH OTHER ULCER SEVERITY: ICD-10-CM

## 2022-02-23 DIAGNOSIS — Z01.818 PRE-TRANSPLANT EVALUATION FOR KIDNEY TRANSPLANT: Primary | ICD-10-CM

## 2022-02-23 DIAGNOSIS — M86.9 OSTEOMYELITIS OF LEFT FOOT, UNSPECIFIED TYPE: ICD-10-CM

## 2022-02-23 DIAGNOSIS — N18.6 ESRD ON HEMODIALYSIS: ICD-10-CM

## 2022-02-23 DIAGNOSIS — Z99.2 ESRD ON HEMODIALYSIS: ICD-10-CM

## 2022-02-23 LAB
ABO + RH BLD: NORMAL
ALBUMIN SERPL BCP-MCNC: 3.4 G/DL (ref 3.5–5.2)
ALP SERPL-CCNC: 288 U/L (ref 55–135)
ALT SERPL W/O P-5'-P-CCNC: 25 U/L (ref 10–44)
ANION GAP SERPL CALC-SCNC: 11 MMOL/L (ref 8–16)
AST SERPL-CCNC: 31 U/L (ref 10–40)
BASOPHILS # BLD AUTO: 0.03 K/UL (ref 0–0.2)
BASOPHILS NFR BLD: 0.7 % (ref 0–1.9)
BILIRUB DIRECT SERPL-MCNC: 0.3 MG/DL (ref 0.1–0.3)
BILIRUB SERPL-MCNC: 0.8 MG/DL (ref 0.1–1)
BLD GP AB SCN CELLS X3 SERPL QL: NORMAL
BUN SERPL-MCNC: 37 MG/DL (ref 6–20)
CALCIUM SERPL-MCNC: 9.4 MG/DL (ref 8.7–10.5)
CHLORIDE SERPL-SCNC: 95 MMOL/L (ref 95–110)
CHOLEST SERPL-MCNC: 211 MG/DL (ref 120–199)
CHOLEST/HDLC SERPL: 3.4 {RATIO} (ref 2–5)
CO2 SERPL-SCNC: 29 MMOL/L (ref 23–29)
COMPLEXED PSA SERPL-MCNC: 1.6 NG/ML (ref 0–4)
CREAT SERPL-MCNC: 6.3 MG/DL (ref 0.5–1.4)
DIFFERENTIAL METHOD: ABNORMAL
EOSINOPHIL # BLD AUTO: 0.1 K/UL (ref 0–0.5)
EOSINOPHIL NFR BLD: 2.9 % (ref 0–8)
ERYTHROCYTE [DISTWIDTH] IN BLOOD BY AUTOMATED COUNT: 15 % (ref 11.5–14.5)
EST. GFR  (AFRICAN AMERICAN): 10.4 ML/MIN/1.73 M^2
EST. GFR  (NON AFRICAN AMERICAN): 9 ML/MIN/1.73 M^2
ESTIMATED AVG GLUCOSE: 97 MG/DL (ref 68–131)
GLUCOSE SERPL-MCNC: 112 MG/DL (ref 70–110)
HBA1C MFR BLD: 5 % (ref 4–5.6)
HCT VFR BLD AUTO: 37 % (ref 40–54)
HDLC SERPL-MCNC: 62 MG/DL (ref 40–75)
HDLC SERPL: 29.4 % (ref 20–50)
HGB BLD-MCNC: 11.7 G/DL (ref 14–18)
IMM GRANULOCYTES # BLD AUTO: 0.02 K/UL (ref 0–0.04)
IMM GRANULOCYTES NFR BLD AUTO: 0.5 % (ref 0–0.5)
LDLC SERPL CALC-MCNC: 136.8 MG/DL (ref 63–159)
LYMPHOCYTES # BLD AUTO: 0.6 K/UL (ref 1–4.8)
LYMPHOCYTES NFR BLD: 15.6 % (ref 18–48)
MCH RBC QN AUTO: 28.8 PG (ref 27–31)
MCHC RBC AUTO-ENTMCNC: 31.6 G/DL (ref 32–36)
MCV RBC AUTO: 91 FL (ref 82–98)
MONOCYTES # BLD AUTO: 0.4 K/UL (ref 0.3–1)
MONOCYTES NFR BLD: 9 % (ref 4–15)
NEUTROPHILS # BLD AUTO: 2.9 K/UL (ref 1.8–7.7)
NEUTROPHILS NFR BLD: 71.3 % (ref 38–73)
NONHDLC SERPL-MCNC: 149 MG/DL
NRBC BLD-RTO: 0 /100 WBC
PHOSPHATE SERPL-MCNC: 3.6 MG/DL (ref 2.7–4.5)
PLATELET # BLD AUTO: 191 K/UL (ref 150–450)
PMV BLD AUTO: 9.9 FL (ref 9.2–12.9)
POTASSIUM SERPL-SCNC: 4.2 MMOL/L (ref 3.5–5.1)
PROT SERPL-MCNC: 8.3 G/DL (ref 6–8.4)
PTH-INTACT SERPL-MCNC: 218 PG/ML (ref 9–77)
RBC # BLD AUTO: 4.06 M/UL (ref 4.6–6.2)
RPR SER QL: NORMAL
SODIUM SERPL-SCNC: 135 MMOL/L (ref 136–145)
TRIGL SERPL-MCNC: 61 MG/DL (ref 30–150)
WBC # BLD AUTO: 4.09 K/UL (ref 3.9–12.7)

## 2022-02-23 PROCEDURE — 80061 LIPID PANEL: CPT | Mod: TXP | Performed by: NURSE PRACTITIONER

## 2022-02-23 PROCEDURE — 99999 PR PBB SHADOW E&M-EST. PATIENT-LVL V: ICD-10-PCS | Mod: PBBFAC,TXP,, | Performed by: NURSE PRACTITIONER

## 2022-02-23 PROCEDURE — 36415 COLL VENOUS BLD VENIPUNCTURE: CPT | Mod: TXP | Performed by: NURSE PRACTITIONER

## 2022-02-23 PROCEDURE — 86592 SYPHILIS TEST NON-TREP QUAL: CPT | Mod: TXP | Performed by: NURSE PRACTITIONER

## 2022-02-23 PROCEDURE — 86480 TB TEST CELL IMMUN MEASURE: CPT | Mod: TXP | Performed by: NURSE PRACTITIONER

## 2022-02-23 PROCEDURE — 86825 HLA X-MATH NON-CYTOTOXIC: CPT | Mod: 91,PO,TXP | Performed by: NURSE PRACTITIONER

## 2022-02-23 PROCEDURE — 84153 ASSAY OF PSA TOTAL: CPT | Mod: TXP | Performed by: NURSE PRACTITIONER

## 2022-02-23 PROCEDURE — 82248 BILIRUBIN DIRECT: CPT | Mod: TXP | Performed by: NURSE PRACTITIONER

## 2022-02-23 PROCEDURE — 99205 PR OFFICE/OUTPT VISIT, NEW, LEVL V, 60-74 MIN: ICD-10-PCS | Mod: S$GLB,,, | Performed by: NURSE PRACTITIONER

## 2022-02-23 PROCEDURE — 86825 HLA X-MATH NON-CYTOTOXIC: CPT | Mod: PO,TXP | Performed by: NURSE PRACTITIONER

## 2022-02-23 PROCEDURE — 80053 COMPREHEN METABOLIC PANEL: CPT | Mod: TXP | Performed by: NURSE PRACTITIONER

## 2022-02-23 PROCEDURE — 86682 HELMINTH ANTIBODY: CPT | Mod: TXP | Performed by: NURSE PRACTITIONER

## 2022-02-23 PROCEDURE — 83970 ASSAY OF PARATHORMONE: CPT | Mod: TXP | Performed by: NURSE PRACTITIONER

## 2022-02-23 PROCEDURE — 81376 HLA II TYPING 1 LOCUS LR: CPT | Mod: 91,PO,TXP | Performed by: NURSE PRACTITIONER

## 2022-02-23 PROCEDURE — 81373 HLA I TYPING 1 LOCUS LR: CPT | Mod: 91,PO | Performed by: NURSE PRACTITIONER

## 2022-02-23 PROCEDURE — 81376 HLA II TYPING 1 LOCUS LR: CPT | Mod: PO,TXP | Performed by: NURSE PRACTITIONER

## 2022-02-23 PROCEDURE — 85025 COMPLETE CBC W/AUTO DIFF WBC: CPT | Mod: TXP | Performed by: NURSE PRACTITIONER

## 2022-02-23 PROCEDURE — 81373 HLA I TYPING 1 LOCUS LR: CPT | Mod: 91,PO,TXP | Performed by: NURSE PRACTITIONER

## 2022-02-23 PROCEDURE — 86706 HEP B SURFACE ANTIBODY: CPT | Mod: TXP | Performed by: NURSE PRACTITIONER

## 2022-02-23 PROCEDURE — 86829 HLA CLASS I/II ANTIBODY QUAL: CPT | Mod: PO,TXP | Performed by: NURSE PRACTITIONER

## 2022-02-23 PROCEDURE — 86803 HEPATITIS C AB TEST: CPT | Mod: TXP | Performed by: NURSE PRACTITIONER

## 2022-02-23 PROCEDURE — 86787 VARICELLA-ZOSTER ANTIBODY: CPT | Mod: TXP | Performed by: NURSE PRACTITIONER

## 2022-02-23 PROCEDURE — 87389 HIV-1 AG W/HIV-1&-2 AB AG IA: CPT | Mod: TXP | Performed by: NURSE PRACTITIONER

## 2022-02-23 PROCEDURE — 99999 PR PBB SHADOW E&M-EST. PATIENT-LVL V: CPT | Mod: PBBFAC,TXP,, | Performed by: NURSE PRACTITIONER

## 2022-02-23 PROCEDURE — 87340 HEPATITIS B SURFACE AG IA: CPT | Mod: TXP | Performed by: NURSE PRACTITIONER

## 2022-02-23 PROCEDURE — 81373 HLA I TYPING 1 LOCUS LR: CPT | Mod: PO | Performed by: NURSE PRACTITIONER

## 2022-02-23 PROCEDURE — 84100 ASSAY OF PHOSPHORUS: CPT | Mod: TXP | Performed by: NURSE PRACTITIONER

## 2022-02-23 PROCEDURE — 83036 HEMOGLOBIN GLYCOSYLATED A1C: CPT | Mod: TXP | Performed by: NURSE PRACTITIONER

## 2022-02-23 PROCEDURE — 86644 CMV ANTIBODY: CPT | Mod: TXP | Performed by: NURSE PRACTITIONER

## 2022-02-23 PROCEDURE — 86828 HLA CLASS I&II ANTIBODY QUAL: CPT | Mod: 91,PO,TXP | Performed by: NURSE PRACTITIONER

## 2022-02-23 PROCEDURE — 86704 HEP B CORE ANTIBODY TOTAL: CPT | Mod: TXP | Performed by: NURSE PRACTITIONER

## 2022-02-23 PROCEDURE — 99205 OFFICE O/P NEW HI 60 MIN: CPT | Mod: S$GLB,,, | Performed by: NURSE PRACTITIONER

## 2022-02-23 PROCEDURE — 86850 RBC ANTIBODY SCREEN: CPT | Mod: TXP | Performed by: NURSE PRACTITIONER

## 2022-02-23 NOTE — PROGRESS NOTES
"   Transplant Nephrology  Kidney Transplant Recipient Evaluation    Referring Physician: Chloe Jean  Current Nephrologist: Chloe Jean    Subjective:   CC:  Initial evaluation of kidney transplant candidacy.    HPI:  Mr. Mcfadden is a 56 y.o. year old Black or  male who has presented to be evaluated as a potential kidney transplant recipient.  He has ESRD secondary to diabetic nephropathy.  Patient is currently on hemodialysis started on 8/2021. Patient is dialyzing on TTS schedule.  Patient reports that he is tolerating dialysis well.. He has a dialysis catheter for dialysis access. Running 3-4 hours per dialysis session, no hypotension with treatments.     Initially declined 8/2021 due to active left foot wound. Has been following with podiatry, and was cleared 12/2021 "Wound has remained healed however is now has bleeding between the layers and fragile epithelium.  I am concerned about reopening. Patient aware that area vulnerable to re-ulceration and he is very concerned about slowly getting back to regular activities."    Following return to work, wound reopened and is requiring debridement. Following closely with podiatry, LOV yesterday with photo of wound in chart:        DM2 for over 20 years. +retinopathy and PVD. Not on insulin. A1c today 5.0. Has had recurrent foot wounds/osteo in the past.    HTN  BP Readings from Last 3 Encounters:   02/23/22 (!) 192/94   02/14/22 (!) 160/80   11/15/21 112/80       Has never had colonoscopy.    Has new jobs where he is not walking around as much to prevent additional foot injuries.      Current Outpatient Medications   Medication Sig Dispense Refill    carvediloL (COREG) 25 MG tablet Take 1 tablet (25 mg total) by mouth 2 (two) times daily. 180 tablet 3    dulaglutide (TRULICITY) 0.75 mg/0.5 mL pen injector INJECT 0.5 ML UNDER THE SKIN EVERY 7 DAYS 12 pen 0    hydrALAZINE (APRESOLINE) 50 MG tablet Take 1 tablet (50 mg total) by mouth 3 (three) times daily. " 300 tablet 3    NIFEdipine (ADALAT CC) 60 MG TbSR TAKE 1 TABLET BY MOUTH TWICE A DAY (Patient taking differently: Take 60 mg by mouth once daily.) 180 tablet 1    sevelamer carbonate (RENVELA) 800 mg Tab Take 1 tablet (800 mg total) by mouth 3 (three) times daily with meals. 90 tablet 11    torsemide (DEMADEX) 20 MG Tab Take 1 tablet (20 mg total) by mouth once daily. (Patient taking differently: Take 20 mg by mouth once daily. On non-HD days) 30 tablet 5     No current facility-administered medications for this visit.       Past Medical History:   Diagnosis Date    CKD (chronic kidney disease), stage III 07/16/2020    CKD stage 3 due to type 1 diabetes mellitus     CKD stage 3 due to type 2 diabetes mellitus     Diabetes mellitus, type 2     Diabetic foot ulcer associated with diabetes mellitus due to underlying condition 07/16/2020    Diabetic foot ulcers     Edema 08/03/2020    generalized, including genital area    Hyperlipidemia     Hypertension     Obese        Past Medical and Surgical History: Mr. Mcfadden  has a past medical history of CKD (chronic kidney disease), stage III, CKD stage 3 due to type 1 diabetes mellitus, CKD stage 3 due to type 2 diabetes mellitus, Diabetes mellitus, type 2, Diabetic foot ulcer associated with diabetes mellitus due to underlying condition, Diabetic foot ulcers, Edema, Hyperlipidemia, Hypertension, and Obese.  He has a past surgical history that includes Cervical disc surgery (07/11/2016); left leg orthopedic surgery (Left); Debridement (Left, 7/17/2020); Bone biopsy (Left, 7/17/2020); Debridement of foot (Right); Fracture surgery (Right); and Insertion of tunneled central venous catheter (CVC) with subcutaneous port (N/A, 6/11/2021).    Past Social and Family History: Mr. Mcfadden reports that he has never smoked. He has never used smokeless tobacco. He reports current alcohol use. He reports that he does not use drugs. His family history includes Heart disease in his  "father.    Review of Systems   Constitutional: Negative for activity change, appetite change and fever.   HENT: Negative for congestion, mouth sores and sore throat.    Eyes: Negative for visual disturbance.   Respiratory: Negative for cough, chest tightness and shortness of breath.    Cardiovascular: Negative for chest pain, palpitations and leg swelling.   Gastrointestinal: Negative for abdominal distention, abdominal pain, constipation, diarrhea and nausea.   Genitourinary: Negative for difficulty urinating, frequency and hematuria.   Musculoskeletal: Negative for arthralgias and gait problem.   Skin: Positive for wound.   Allergic/Immunologic: Negative for environmental allergies, food allergies and immunocompromised state.   Neurological: Negative for dizziness, weakness and numbness.   Psychiatric/Behavioral: Negative for sleep disturbance. The patient is not nervous/anxious.        Objective:   Blood pressure (!) 192/94, pulse 79, temperature 97.7 °F (36.5 °C), temperature source Tympanic, resp. rate 16, height 6' 0.28" (1.836 m), weight 113.8 kg (250 lb 14.1 oz), SpO2 99 %.body mass index is 33.76 kg/m².    Physical Exam  Vitals and nursing note reviewed.   Constitutional:       Appearance: Normal appearance.   HENT:      Head: Normocephalic.   Cardiovascular:      Rate and Rhythm: Normal rate and regular rhythm.      Comments: Chest catheter, no erythema, edema, tenderness or drainage.   Pulmonary:      Effort: Pulmonary effort is normal.   Abdominal:      General: There is no distension.      Palpations: Abdomen is soft.      Tenderness: There is no abdominal tenderness.   Musculoskeletal:         General: Normal range of motion.   Feet:      Left foot:      Skin integrity: Ulcer present.   Skin:     General: Skin is warm and dry.   Neurological:      General: No focal deficit present.      Mental Status: He is alert.   Psychiatric:         Behavior: Behavior normal.         Labs:  Lab Results   Component " Value Date    WBC 4.09 02/23/2022    HGB 11.7 (L) 02/23/2022    HCT 37.0 (L) 02/23/2022     (L) 02/23/2022    K 4.2 02/23/2022    CL 95 02/23/2022    CO2 29 02/23/2022    BUN 37 (H) 02/23/2022    CREATININE 6.3 (H) 02/23/2022    EGFRNONAA 9.0 (A) 02/23/2022    CALCIUM 9.4 02/23/2022    PHOS 3.6 02/23/2022    MG 1.7 08/02/2021    ALBUMIN 3.4 (L) 02/23/2022    AST 31 02/23/2022    ALT 25 02/23/2022    UTPCR 3.12 (H) 07/27/2021    .0 (H) 02/23/2022       Lab Results   Component Value Date    BILIRUBINUA Negative 07/27/2021    PROTEINUA 3+ (A) 07/27/2021    NITRITE Negative 07/27/2021    RBCUA 1 07/27/2021    WBCUA 3 07/27/2021     Labs were reviewed with the patient.    Assessment:     1. Pre-transplant evaluation for kidney transplant    2. ESRD on hemodialysis    3. Diabetic ulcer of toe of left foot associated with type 2 diabetes mellitus, with other ulcer severity    4. Essential hypertension    5. BMI 33.0-33.9,adult    6. Osteomyelitis of left foot, unspecified type        Plan:   Unacceptable transplant candidate at this time due to active foot wound. He will continue following closely with podiatry, and can be referred when completely healed with podiatry clearance.    Afternoon testing cancelled, patient sent home.     Transplant Candidacy:   Based on available information, Mr. Mcfadden is an unacceptable kidney transplant candidate due to active foot wound.   Meets center eligibility for accepting HCV+ donor offer - yes.  Patient educated on HCV+ donors. Catalino is willing to accept HCV+ donor offer - yes   Patient is a candidate for KDPI > 85 kidney donor offer - no (weight >88kg).  Final determination of transplant candidacy will be made once workup is complete and reviewed by the selection committee.    Patient advised that it is recommended that all transplant candidates, and their close contacts and household members receive Covid vaccination.    Aleida Louis NP       [facility] Patient  Status  Functional Status: 80% - Normal activity with effort: some symptoms of disease  Physical Capacity: No Limitations

## 2022-02-23 NOTE — LETTER
February 24, 2022        Chloe Jean  [address] [facility] [address]  Phone: [phone]  Fax: [phone]             [facility]- [address]  [address]  [place] [address]  Phone: [phone]   Patient: Catalino Mcfadden   MR Number: [id number]   YOB: 1965   Date of Visit: 2/23/2022       Dear Dr. Chloe Jean    Thank you for referring Catalino Mcfadden to me for evaluation. Attached you will find relevant portions of my assessment and plan of care.    If you have questions, please do not hesitate to call me. I look forward to following Catalino Mcfadden along with you.    Sincerely,    Aleida Louis, SWATHI    Enclosure    If you would like to receive this communication electronically, please contact [email or web address] or [phone] to request Social[employer] Link access.    Social[employer] Link is a tool which provides read-only access to select patient information with whom you have a relationship. Its easy to use and provides real time access to review your patients record including encounter summaries, notes, results, and demographic information.    If you feel you have received this communication in error or would no longer like to receive these types of communications, please e-mail [email or web address]

## 2022-02-23 NOTE — PROGRESS NOTES
PHARM.D. PRE-TRANSPLANT NOTE:    This patient's medication therapy was evaluated as part of his pre-transplant evaluation.      The following general pharmacologic concerns were noted: none    The following concerns for post-operative pain management were noted: none    The following pharmacologic concerns related to HCV therapy were noted: none      This patient's medication profile was reviewed for considerations for DAA Hepatitis C therapy:    [x]  No current inducers of CYP 3A4 or PGP  [x]  No amiodarone on this patient's EMR profile in the last 24 months  [x]  No past or current atrial fibrillation on this patient's EMR profile       Current Outpatient Medications   Medication Sig Dispense Refill    carvediloL (COREG) 25 MG tablet Take 1 tablet (25 mg total) by mouth 2 (two) times daily. 180 tablet 3    dulaglutide (TRULICITY) 0.75 mg/0.5 mL pen injector INJECT 0.5 ML UNDER THE SKIN EVERY 7 DAYS 12 pen 0    hydrALAZINE (APRESOLINE) 50 MG tablet Take 1 tablet (50 mg total) by mouth 3 (three) times daily. 300 tablet 3    NIFEdipine (ADALAT CC) 60 MG TbSR TAKE 1 TABLET BY MOUTH TWICE A DAY (Patient taking differently: Take 60 mg by mouth once daily.) 180 tablet 1    sevelamer carbonate (RENVELA) 800 mg Tab Take 1 tablet (800 mg total) by mouth 3 (three) times daily with meals. 90 tablet 11    torsemide (DEMADEX) 20 MG Tab Take 1 tablet (20 mg total) by mouth once daily. (Patient taking differently: Take 20 mg by mouth once daily. On non-HD days) 30 tablet 5     No current facility-administered medications for this visit.           I am available for consultation and can be contacted, as needed by the other members of the Transplant team.

## 2022-02-24 LAB
GAMMA INTERFERON BACKGROUND BLD IA-ACNC: 0.1 IU/ML
M TB IFN-G CD4+ BCKGRND COR BLD-ACNC: 0 IU/ML
M TB IFN-G CD4+CD8+ BCKGRND COR BLD-ACNC: 0 IU/ML
MITOGEN IGNF BCKGRD COR BLD-ACNC: 8.34 IU/ML
STRONGYLOIDES ANTIBODY IGG: NEGATIVE
TB GOLD PLUS: NEGATIVE

## 2022-02-24 NOTE — PROGRESS NOTES
INITIAL PATIENT EDUCATION NOTE    Mr. Catalino Mcfadden was seen in pre-kidney transplant clinic for evaluation for kidney, kidney/pancreas or pancreas only transplant.  The patient attended a an individual video education session that discussed/reviewed the following aspects of transplantation: evaluation and selection committee process, [facility]OS waitlist management/multiple listings, types of organs offered (KDPI < 85%, KDPI > 85%, PHS risk, DCD, HCV+, HIV+ for HIV+ recipients and enbloc/dual), financial aspects, surgical procedures, dietary instruction pre- and post-transplant, health maintenance pre- and post-transplant, post-transplant hospitalization and outpatient follow-up, potential to participate in a research protocol, and medication management and side effects.  A question and answer session was provided after the presentation.    The patient was seen by all members of the multi-disciplinary team to include: Nephrologist/PA, Surgeon, , Transplant Coordinator, , Pharmacist and Dietician (if applicable).    The patient reviewed and signed all consents for evaluation which were witnessed and sent to scanning into the EPIC chart.    The patient was given an education book and plan for further evaluation based on his individual assessment.      Reviewed program requirement for complete COVID vaccination with documentation prior to listing.  COVID education information reviewed with patient.     The patient was informed that the transplant team would manage immediate post op pain. If the patient requires long term pain management, they will need to have that pain management addressed by their PCP or previous provider who wrote for long term pain medicines.    The patient was encouraged to call with any questions or concerns.

## 2022-02-25 ENCOUNTER — COMMITTEE REVIEW (OUTPATIENT)
Dept: TRANSPLANT | Facility: CLINIC | Age: 57
End: 2022-02-25
Payer: COMMERCIAL

## 2022-02-25 ENCOUNTER — TELEPHONE (OUTPATIENT)
Dept: TRANSPLANT | Facility: CLINIC | Age: 57
End: 2022-02-25
Payer: COMMERCIAL

## 2022-02-25 LAB
CMV IGG SERPL QL IA: REACTIVE
HBV CORE AB SERPL QL IA: NEGATIVE
HBV SURFACE AG SERPL QL IA: NEGATIVE
HCV AB SERPL QL IA: NEGATIVE
HIV 1+2 AB+HIV1 P24 AG SERPL QL IA: NEGATIVE
VARICELLA INTERPRETATION: NEGATIVE
VARICELLA ZOSTER IGG: 0.27 ISR (ref 0–0.9)

## 2022-02-25 NOTE — TELEPHONE ENCOUNTER
Contacted patient to confirm episode will be closed and appointments on March 11 cancelled. He verbalized understanding and that he is upset that he will be declined again. All questions answered at this time.

## 2022-02-25 NOTE — LETTER
February 25, 2022    Catalino Mcfadden  [address] [facility] [address]      Dear Catalino Mcfadden:  MRN: [id number]    It is the duty of the [facility] Selection Committee to determine which patients are candidates for a transplant. For this reason, our committee has the difficult task of evaluating patients to determine which ones have the greatest chance of having a successful transplant. We are aware of the magnitude of this responsibility, and we approach it with reverence and humility.    It is with regret I inform you that you are not approved as a transplant candidate due to your active foot wound. You may be re-referred after your wound heals. Your future transplant appointments for March 11, 2022 have been canceled.  Based on this review, we have determined that at this time, you are not a candidate for a transplant at [facility] at this time.      The selection committee carefully considers each patient's transplant candidacy and determines whether it is safe to proceed with transplantation on a case-by-case basis using established selection criteria.  At present, the risk of proceeding with an elective transplant surgery has become too high.                                                                               Although the selection committee believes you are not a suitable transplant candidate, you have the option to be evaluated at other transplant centers who may have different selection criteria.  You may request your [facility] records be sent to any center of your choice by contacting our Medical Records Department at [phone].                                                                               Attached is a letter from the [employer] for Organ Sharing (UNOS).  It describes the services and information offered to patients by UNOS and the Organ Procurement and Transplant Network.    The [facility] Selection Committee sincerely wishes you the best and remains  available to answer any questions.  Please do not hesitate to contact our pre-transplant office if we can assist you in any other way.                                                                               Sincerely,      Guerda Bowen MD  Medical Director, Kidney & Kidney/Pancreas Transplantation    Tj/Enclosed    Cc: Dr. Chloe Jean    Encl: UNOS Letter             The Organ Procurement and Transplantation Network   Toll-free patient services line: Your resource for organ transplant information     If you have a question regarding your own medical care, you always should call your transplant hospital first. However, for general organ transplant-related information, you can call the Organ Procurement and Transplantation Network (OPTN) toll-free patient services line at [phone].     Anyone, including potential transplant candidates, candidates, recipients, family members, friends, living donors, and donor family members, can call this number to:     · Talk about organ donation, living donation, the transplant process, the donation process, and transplant policies.   · Get a free patient information kit with helpful booklets, waiting list and transplant information, and a list of all transplant hospitals.   · Ask questions about the OPTN website (https://optn.transplant.hrsa.gov/), the [employer] for Organ Sharings (UNOS) website (https://unos.org/), or the UNOS website for living donors and transplant recipients. (https://www.transplantliving.org/).   · Learn how the OPTN can help you.   · Talk about any concerns that you may have with a transplant hospital.     The nations transplant system, the OPTN, is managed under federal contract by the [employer] Organ Sharing (UNOS), which is a non-profit charitable organization. The OPTN helps create and define organ sharing policies that make the best use of donated organs. This process continuously evaluating new advances and  discoveries so policies can be adapted to best serve patients waiting for transplants. To do so, the OPTN works closely with transplant professionals, transplant patients, transplant candidates, donor families, living donors, and the public. All transplant programs and organ procurement organizations throughout the country are OPTN members and are obligated to follow the policies the OPTN creates for allocating organs.     The OPTN also is responsible for:   · Providing educational material for patients, the public, and professionals.   · Raising awareness of the need for donated organs and tissue.   · Coordinating organ procurement, matching, and placement.   · Collecting information about every organ transplant and donation that occurs in the [place].     Remember, you should contact your transplant hospital directly if you have questions or concerns about your own medical care including medical records, work-up progress, and test results.     We are not your transplant hospital, and our staff will not be able to answer questions about your case, so please keep your transplant hospitals phone number handy.   However, while you research your transplant needs and learn as much as you can about transplantation and donation, we welcome your call to our toll-free patient services line at [phone].

## 2022-02-25 NOTE — COMMITTEE REVIEW
Native Organ Dx: Diabetes Mellitus - Type II      Not approved for LRD/CAD transplant due to active foot wound.  Patient may be re-referred after foot wound heals.      Note written by Aracelis Fox RN    ===============================================    I was present at the meeting and attest to the decision of the committee

## 2022-02-26 LAB
HBV SURFACE AB SER QL IA: NEGATIVE
HBV SURFACE AB SERPL IA-ACNC: 8 MIU/ML

## 2022-03-01 NOTE — PROGRESS NOTES
[facility]   Progress Note    Subjective:       Patient ID: Catalino Mcfadden is a 56 y.o. male.    Chief Complaint: Diabetic Foot Exam (PCP Dr.Lombard 11/15/2021) and Follow-up    Patient ambulated to exam room with mepilex, tubigrip, and New Balance shoe on. No new pedal complaints    10/26/2021 patient presents to podiatry clinic s/p discharge from wound clinic.  He relates th has care for foot as instructed and relates subjectively remains healed however he has concerns that excessive ambulationon return to work may case wound to recur    11/16/2021 He relates th has care for foot as instructed and relates subjectively remains healed but has increased callus formation. He has bee padding the area and wearing new balance tennis shoes. He has not returned to work. He would like to discuss returning to work.    12/07/2021 he continues to cushion previous wound site and wear new balance tennis shoes. Subjectively remains healed but has significant callus formation.  He has not returned to work. He would like to discuss return to work timeline.    12/28/2021 he continues to cushion previous wound site and wear new balance tennis shoes. Subjectively remains healed but has significant callus formation.  He has not returned to work    1/18/2022 he continues to cushion previous wound site and wear new balance tennis shoes. Subjectively remains healed but has significant callus formation.  He has accepted a new position and began work yesterday. He no longer has to move [employer]    1/25/22: Reports noticing drainage left plantar lateral foot. Denies N/V/F/C. Patient presented as walk in to clinic    2/1/2022 Returns to clinic for follow up of  left foot ulcers.  Patient has left football dressing clean, dry, intact.  Patient denies pain. No new pedal complaints.    02/08/2022 Returns to clinic for follow up of  left foot ulcers.  Patient has left football dressing clean, dry, intact.  Patient denies  "pain. No new pedal complaints.    2/22/2022 Returns to clinic for follow up of  left foot ulcers.  Patient has left football dressing clean, dry, intact for two weeks. He missed appointment and was unable to come to offered alternatives until today.  Patient denies pain. No new pedal complaints.        Wound Check    Follow-up  Associated symptoms include arthralgias, headaches, joint swelling, myalgias and numbness. Pertinent negatives include no chest pain, chills, coughing, fever, nausea, vomiting or weakness.       Review of Systems   Constitutional: Negative for appetite change, chills and fever.   Respiratory: Negative for cough, shortness of breath and wheezing.    Cardiovascular: Positive for leg swelling. Negative for chest pain.   Gastrointestinal: Negative for diarrhea, nausea and vomiting.   Musculoskeletal: Positive for arthralgias, joint swelling and myalgias.   Skin: Positive for wound.   Neurological: Positive for numbness and headaches. Negative for weakness.        + paresthesia          Objective:     Vitals:    02/22/22 1043   Weight: 117 kg (258 lb)   Height: 6' 1" (1.854 m)   PainSc: 0-No pain          Physical Exam  Vitals and nursing note reviewed.   Constitutional:       General: He is not in acute distress.     Appearance: He is not toxic-appearing or diaphoretic.      Comments: Pt. is well-developed, well-nourished, appears stated age, in no acute distress, alert and oriented x 3. No evidence of depression, anxiety, or agitation. Calm, cooperative, and communicative. Appropriate interactions and affect.   Cardiovascular:      Pulses:           Dorsalis pedis pulses are 2+ on the right side and 2+ on the left side.        Posterior tibial pulses are 1+ on the right side and 1+ on the left side.      Comments: There is decreased digital hair. Skin is atrophic, slightly hyperpigmented  Pulmonary:      Effort: No respiratory distress.   Musculoskeletal:      Right ankle: No swelling. No " tenderness. No lateral malleolus, medial malleolus, AITF ligament, CF ligament or posterior TF ligament tenderness. Normal range of motion.      Right Achilles Tendon: No defects. Hilliard's test negative.      Left ankle: No swelling. No tenderness. No lateral malleolus, medial malleolus, AITF ligament, CF ligament or posterior TF ligament tenderness. Normal range of motion.      Left Achilles Tendon: No defects. Hilliard's test negative.      Right foot: No tenderness or bony tenderness.      Left foot: No tenderness or bony tenderness.      Comments: Decreased stride, station of gait.  apropulsive toe off.  Increased angle and base of gait.    There is equinus deformity bilateral with decreased dorsiflexion at the ankle joint bilateral. No tenderness with compression of heel. Negative tinels sign. Gait analysis reveals excessive pronation through midstance and propulsion with early heel off.     Patient has hammertoes of digits 2-5 bilateral partially reducible     Decreased first MPJ range of motion both weightbearing and nonweightbearing, no crepitus observed the first MP joint, + dorsal flag sign.     Visible and palpable bunion without pain at dorsomedial 1st metatarsal head right and left.  Hallux abducted right and left partially reducible, tracks laterally without being track bound.  No ecchymosis, erythema, edema, or cardinal signs infection or signs of trauma same foot.    Fat pad atrophy to heels and met heads bilateral    Plantarflexed 1st ray RLE   Lymphadenopathy:      Comments: No lymphatic streaking    Negative lymphadenopathy bilateral popliteal fossa and tarsal tunnel.     Skin:     General: Skin is warm and dry.      Coloration: Skin is not pale.      Findings: Erythema and lesion (see wound description below) present. No abrasion, ecchymosis, laceration or rash.      Nails: There is no clubbing.      Comments: Toenails 1-5 bilaterally discolored/yellowed, dystrophic, brittle with subungual  debris.    Neurological:      Sensory: Sensory deficit present.      Comments:  [place] 5.07 monofilamant testing is diminished Kp feet. Decreased/absent vibratory sensation bilateral feet to 128Hz tuning fork.     Paresthesias, and hyperesthesia bilateral feet with no clearly identified trigger or source.           Psychiatric:         Attention and Perception: He is attentive.         Mood and Affect: Mood is not anxious. Affect is not inappropriate.         Speech: He is communicative. Speech is not slurred.         Behavior: Behavior is not combative.           02/22/22:  Wound 1: left lateral foot   Measurement: 1.5cmx0.9cmx0.3cm  Base: fibrogranular   Periwound skin: maceration and HPK  Drainage: serosanguinous  Erythema: local  Probe: none, no bone exposed            02/08/22:  Wound 1: left lateral foot   Measurement: 0.9cmx0.4cmx0.2cm  Base: fibrogranular   Periwound skin: maceration and HPK  Drainage: serosanguinous  Erythema: none   Probe: none, no bone exposed          02/01/22:  Wound 1: left lateral foot   Measurement: 1.4cmx0.6cmx0.2cm  Base: fibrogranular   Periwound skin: maceration and HPK  Drainage: serosanguinous  Erythema: none   Probe: none, no bone exposed                1/25/22:  Wound 1: left lateral foot   Measurement: 0.3cmx0.3cmx0.1cm pre debridement 0.5cmx0.5cmx0.1cm post debridement.  Base: fibrogranular   Periwound skin: HPK  Drainage:none   Erythema: none   Probe: none, no bone exposed        Pre debridement       Post debridement             1/18/2022    significant bleeding in layers              12/28/2021 12/07/2021 11/16/2021    Appears preulcerative            10/26/2021          Assessment:           ICD-10-CM ICD-9-CM   1. Type II diabetes mellitus with neurological manifestations  E11.49 250.60   2. Foot ulceration, left, with fat layer exposed  L97.522 707.15                Plan:         Recurrence of left foot ulceration.   Education  about the prevention of limb loss.    Discussed wound healing cycle, skin integrity, ways to care for skin.Counseled patient on the effects of pressure and blood glucose on healing. He verbalizes understanding that it can increase the chances of delayed healing and this prolonged exposure leads to infection or progression of infection which subsequently can result in loss of limb.    Adequate vitamin supplementation, protein intake, and hydration - discussed with patient    The wound is cleansed of foreign material as much as possible and the base inspected for bone or abscess. Base is granular and without bone nor joint exposure    Regression.  High risk for OM    Dressings: maceration periwound, Gentian violet periwound,  Iodosorb to bed, breathable football with Tubigrip  Offloading:Foam    Follow-up: wound clinic but should call [facility] immediately if any signs of infection, such as fever, chills, sweats, increased redness or pain.    Short-term goals include maintaining good offloading and minimizing bioburden, promoting granulation and epithelialization to healing.  Long-term goals include keeping the wound healed by good offloading and medical management under the direction of internist.    Shoe inspection. Diabetic Foot Education. Patient reminded of the importance of good nutrition and blood sugar control to help prevent podiatric complications of diabetes. Patient instructed on proper foot hygeine. We discussed wearing proper shoe gear, daily foot inspections, never walking without protective shoe gear, never putting sharp instruments to feet.            Orders Placed This Encounter   Procedures    Ambulatory referral/consult to Wound Clinic     Standing Status:   Future     Standing Expiration Date:   3/22/2023     Referral Priority:   Routine     Referral Type:   Consultation     Referral Reason:   Specialty Services Required     Requested Specialty:   Wound Care     Number of Visits Requested:   1         No follow-ups on file.         Procedures

## 2022-03-04 ENCOUNTER — HOSPITAL ENCOUNTER (OUTPATIENT)
Dept: WOUND CARE | Facility: HOSPITAL | Age: 57
Discharge: HOME | End: 2022-03-04
Attending: PODIATRIST
Payer: COMMERCIAL

## 2022-03-04 VITALS
SYSTOLIC BLOOD PRESSURE: 136 MMHG | HEART RATE: 76 BPM | RESPIRATION RATE: 20 BRPM | DIASTOLIC BLOOD PRESSURE: 84 MMHG | TEMPERATURE: 98 F

## 2022-03-04 DIAGNOSIS — E11.49 TYPE II DIABETES MELLITUS WITH NEUROLOGICAL MANIFESTATIONS: Primary | ICD-10-CM

## 2022-03-04 DIAGNOSIS — L97.522 FOOT ULCERATION, LEFT, WITH FAT LAYER EXPOSED: ICD-10-CM

## 2022-03-04 DIAGNOSIS — E11.621 DIABETIC ULCER OF LEFT MIDFOOT ASSOCIATED WITH TYPE 2 DIABETES MELLITUS, WITH FAT LAYER EXPOSED: ICD-10-CM

## 2022-03-04 DIAGNOSIS — L97.422 DIABETIC ULCER OF LEFT MIDFOOT ASSOCIATED WITH TYPE 2 DIABETES MELLITUS, WITH FAT LAYER EXPOSED: ICD-10-CM

## 2022-03-04 DIAGNOSIS — N18.6 ESRD (END STAGE RENAL DISEASE): ICD-10-CM

## 2022-03-04 DIAGNOSIS — L90.9 PLANTAR FAT PAD ATROPHY: ICD-10-CM

## 2022-03-04 LAB
B CELL RESULTS - XM AUTO: NEGATIVE
B MCS AVERAGE - XM AUTO: -16.1
FXMAU TESTING DATE: NORMAL
HLA AB QL: NEGATIVE
HLA AB SERPL: NEGATIVE
HLATY INTERPRETATION: NORMAL
SCRFL TESTING DATE: NORMAL
SERUM COLLECTION DT - XM AUTO: NORMAL
SERUM COLLECTION DT: NORMAL
T CELL RESULTS - XM AUTO: NEGATIVE
T MCS AVERAGE - XM AUTO: -30.1

## 2022-03-04 PROCEDURE — 99499 NO LOS: ICD-10-PCS | Mod: ,,, | Performed by: PODIATRIST

## 2022-03-04 PROCEDURE — 11042 DBRDMT SUBQ TIS 1ST 20SQCM/<: CPT | Mod: ,,, | Performed by: PODIATRIST

## 2022-03-04 PROCEDURE — 11042 DBRDMT SUBQ TIS 1ST 20SQCM/<: CPT

## 2022-03-04 PROCEDURE — 99499 UNLISTED E&M SERVICE: CPT | Mod: ,,, | Performed by: PODIATRIST

## 2022-03-04 PROCEDURE — 11042 WOUND DEBRIDEMENT: ICD-10-PCS | Mod: ,,, | Performed by: PODIATRIST

## 2022-03-04 NOTE — PROGRESS NOTES
[facility]                Progress Note    Subjective:       Patient ID: Catalino Mcfadden is a 56 y.o. male.    Chief Complaint: Wound Check    Readmit to [facility].Previoud wound reopened likely secondary to shoe.. Patient ambulatory to exam room with no difficulty noted. Patient denies pain at present. Wound dressing to left foot intact with moderate amount of drainage noted. .      Review of Systems   Constitutional: Negative for appetite change, chills and fever.   Respiratory: Negative for cough, shortness of breath and wheezing.    Cardiovascular: Positive for leg swelling. Negative for chest pain.   Gastrointestinal: Negative for diarrhea, nausea and vomiting.   Musculoskeletal: Positive for arthralgias, joint swelling and myalgias.   Skin: Positive for wound.   Neurological: Positive for numbness and headaches. Negative for weakness.        + paresthesia          Objective:        Physical Exam  Vitals and nursing note reviewed.   Constitutional:       General: He is not in acute distress.     Appearance: He is not toxic-appearing or diaphoretic.      Comments: Pt. is well-developed, well-nourished, appears stated age, in no acute distress, alert and oriented x 3. No evidence of depression, anxiety, or agitation. Calm, cooperative, and communicative. Appropriate interactions and affect.   Cardiovascular:      Pulses:           Dorsalis pedis pulses are 2+ on the right side and 2+ on the left side.        Posterior tibial pulses are 1+ on the right side and 1+ on the left side.      Comments: There is decreased digital hair. Skin is atrophic, slightly hyperpigmented  Pulmonary:      Effort: No respiratory distress.   Musculoskeletal:      Right ankle: No swelling. No tenderness. No lateral malleolus, medial malleolus, AITF ligament, CF ligament or posterior TF ligament tenderness. Normal range of motion.      Right Achilles Tendon: No defects. Hilliard's test negative.       Left ankle: No swelling. No tenderness. No lateral malleolus, medial malleolus, AITF ligament, CF ligament or posterior TF ligament tenderness. Normal range of motion.      Left Achilles Tendon: No defects. Hilliard's test negative.      Right foot: No tenderness or bony tenderness.      Left foot: No tenderness or bony tenderness.      Comments: Decreased stride, station of gait.  apropulsive toe off.  Increased angle and base of gait.    There is equinus deformity bilateral with decreased dorsiflexion at the ankle joint bilateral. No tenderness with compression of heel. Negative tinels sign. Gait analysis reveals excessive pronation through midstance and propulsion with early heel off.     Patient has hammertoes of digits 2-5 bilateral partially reducible     Decreased first MPJ range of motion both weightbearing and nonweightbearing, no crepitus observed the first MP joint, + dorsal flag sign.     Visible and palpable bunion without pain at dorsomedial 1st metatarsal head right and left.  Hallux abducted right and left partially reducible, tracks laterally without being track bound.  No ecchymosis, erythema, edema, or cardinal signs infection or signs of trauma same foot.    Fat pad atrophy to heels and met heads bilateral    Plantarflexed 1st ray RLE   Lymphadenopathy:      Comments: No lymphatic streaking    Negative lymphadenopathy bilateral popliteal fossa and tarsal tunnel.     Skin:     General: Skin is warm and dry.      Coloration: Skin is not pale.      Findings: Erythema and lesion (see wound description below) present. No abrasion, ecchymosis, laceration or rash.      Nails: There is no clubbing.      Comments: Toenails 1-5 bilaterally discolored/yellowed, dystrophic, brittle with subungual debris.    Neurological:      Sensory: Sensory deficit present.      Comments:  [place] 5.07 monofilamant testing is diminished Kp feet. Decreased/absent vibratory sensation bilateral feet to 128Hz tuning  fork.     Paresthesias, and hyperesthesia bilateral feet with no clearly identified trigger or source.           Psychiatric:         Attention and Perception: He is attentive.         Mood and Affect: Mood is not anxious. Affect is not inappropriate.         Speech: He is communicative. Speech is not slurred.         Behavior: Behavior is not combative.           Vitals:    03/04/22 0801   BP: 136/84   Pulse: 76   Resp: 20   Temp: 97.7 °F (36.5 °C)       Assessment:           ICD-10-CM ICD-9-CM   1. Foot ulceration, left, with fat layer exposed  L97.522 707.15            Wound 03/04/22 0814 Diabetic Ulcer Left lateral;plantar Foot (Active)   03/04/22 0814    Pre-existing:    Primary Wound Type: Diabetic ulc   Side: Left   Orientation: lateral;plantar   Location: Foot   Wound Number:    Ankle-Brachial Index:    Pulses:    Removal Indication and Assessment:    Wound Outcome:    (Retired) Wound Type:    (Retired) Wound Length (cm):    (Retired) Wound Width (cm):    (Retired) Depth (cm):    Wound Description (Comments):    Removal Indications:    Wound Image    03/04/22 0800   Wound WDL ex 03/04/22 0800   Dressing Appearance Intact;Moist drainage 03/04/22 0800   Drainage Amount Moderate 03/04/22 0800   Drainage Characteristics/Odor Serosanguineous 03/04/22 0800   Appearance Red 03/04/22 0800   Tissue loss description Partial thickness 03/04/22 0800   Black (%), Wound Tissue Color 0 % 03/04/22 0800   Red (%), Wound Tissue Color 100 % 03/04/22 0800   Yellow (%), Wound Tissue Color 0 % 03/04/22 0800   Periwound Area Pale white 03/04/22 0800   Wound Edges Callused 03/04/22 0800   Wound Length (cm) 0.4 cm 03/04/22 0800   Wound Width (cm) 0.3 cm 03/04/22 0800   Wound Depth (cm) 0.2 cm 03/04/22 0800   Wound Volume (cm^3) 0.024 cm^3 03/04/22 0800   Wound Surface Area (cm^2) 0.12 cm^2 03/04/22 0800   Care Cleansed with:;Soap and water;Sterile normal saline 03/04/22 0800   Dressing Applied 03/04/22 0800   Periwound Care Moisture  "barrier applied 03/04/22 0800   Compression Two layer compression 03/04/22 0800   Off Loading Football dressing;Off loading shoe 03/04/22 0800   Dressing Change Due 03/09/22 03/04/22 0800           Plan:              Recurrence of left foot ulceration.     Education about the prevention of limb loss.    Discussed wound healing cycle, skin integrity, ways to care for skin.Counseled patient on the effects of pressure and blood glucose on healing. He verbalizes understanding that it can increase the chances of delayed healing and this prolonged exposure leads to infection or progression of infection which subsequently can result in loss of limb.    Adequate vitamin supplementation, protein intake, and hydration - discussed with patient    The wound is cleansed of foreign material as much as possible and the base inspected for bone or abscess. Base is granular and without bone nor joint exposure.  High risk for OM    Short-term goals include maintaining good offloading and minimizing bioburden, promoting granulation and epithelialization to healing.  Long-term goals include keeping the wound healed by good offloading and medical management under the direction of internist.    Shoe inspection. Diabetic Foot Education. Patient reminded of the importance of good nutrition and blood sugar control to help prevent podiatric complications of diabetes. Patient instructed on proper foot hygeine. We discussed wearing proper shoe gear, daily foot inspections, never walking without protective shoe gear, never putting sharp instruments to feet.          Wound Debridement    Date/Time: 3/4/2022 7:49 AM  Performed by: Aviva Martinez DPM  Authorized by: Aviva Martinez DPM     Time out: Immediately prior to procedure a "time out" was called to verify the correct patient, procedure, equipment, support staff and site/side marked as required.    Consent Done?:  Yes (Written)    Preparation: Patient was prepped and draped in usual sterile " fashion    Local anesthesia used?: No      Wound Details:    Location:  Left foot    Location:  Left Midfoot       Length (cm):  0.4       Area (sq cm):  0.12       Width (cm):  0.3       Percent Debrided (%):  100       Depth (cm):  0.2       Total Area Debrided (sq cm):  0.12    Depth of debridement:  Subcutaneous tissue    Tissue debrided:  Dermis, Epidermis and Subcutaneous    Devitalized tissue debrided:  Callus and Fibrin    Instruments:  Curette    Bleeding:  Minimal  Hemostasis Achieved: Yes    Method Used:  Pressure  Patient tolerance:  Patient tolerated the procedure well with no immediate complications             Orders Placed This Encounter   Procedures    Ambulatory referral/consult to Wound Clinic     Standing Status:   Standing     Number of Occurrences:   1     Referral Priority:   Routine     Referral Type:   Consultation     Referral Reason:   Specialty Services Required     Requested Specialty:   Wound Care     Number of Visits Requested:   1    Change dressing     Clean with NS. Gentian violet to periwound. Cavilon/benzoin to plantar foot. Apply iodoflex/endoform to wound bed. Custom fit felt foam with small hole over wound. Mextra short x1 secured with medipore tape. Football dressing (cast padding x3), coflex calmine (apply calamine layer on skin before placing cast padding). Darco.        Follow up in about 5 days (around 3/9/2022) for wound care.

## 2022-03-09 ENCOUNTER — HOSPITAL ENCOUNTER (OUTPATIENT)
Dept: WOUND CARE | Facility: HOSPITAL | Age: 57
Discharge: HOME | End: 2022-03-09
Attending: FAMILY MEDICINE
Payer: COMMERCIAL

## 2022-03-09 VITALS — HEART RATE: 75 BPM | DIASTOLIC BLOOD PRESSURE: 88 MMHG | SYSTOLIC BLOOD PRESSURE: 165 MMHG

## 2022-03-09 DIAGNOSIS — L97.522 FOOT ULCERATION, LEFT, WITH FAT LAYER EXPOSED: Primary | ICD-10-CM

## 2022-03-09 PROCEDURE — 29581 APPL MULTLAYER CMPRN SYS LEG: CPT

## 2022-03-09 PROCEDURE — 99213 OFFICE O/P EST LOW 20 MIN: CPT

## 2022-03-09 NOTE — PROGRESS NOTES
[facility]  [address]  [id number]  Nurse Visit    Subjective:       Patient seen in clinic today.  Dressing changed as ordered.      Assessment:          Wound 03/04/22 0814 Diabetic Ulcer Left lateral;plantar Foot (Active)   03/04/22 0814    Pre-existing:    Primary Wound Type: Diabetic ulc   Side: Left   Orientation: lateral;plantar   Location: Foot   Wound Number:    Ankle-Brachial Index:    Pulses:    Removal Indication and Assessment:    Wound Outcome:    (Retired) Wound Type:    (Retired) Wound Length (cm):    (Retired) Wound Width (cm):    (Retired) Depth (cm):    Wound Description (Comments):    Removal Indications:    Wound WDL ex 03/09/22 1700   Dressing Appearance Intact;Moist drainage 03/09/22 1700   Drainage Amount Moderate 03/09/22 1700   Drainage Characteristics/Odor Serosanguineous 03/09/22 1700   Appearance Red 03/09/22 1700   Tissue loss description Partial thickness 03/09/22 1700   Black (%), Wound Tissue Color 0 % 03/09/22 1700   Red (%), Wound Tissue Color 100 % 03/09/22 1700   Yellow (%), Wound Tissue Color 0 % 03/09/22 1700   Periwound Area Dry;Intact 03/09/22 1700   Wound Edges Callused 03/09/22 1700   Care Cleansed with:;Soap and water;Sterile normal saline 03/09/22 1700   Dressing Changed 03/09/22 1700   Periwound Care Moisture barrier applied 03/09/22 1700   Compression Two layer compression 03/09/22 1700   Off Loading Football dressing;Off loading shoe 03/09/22 1700   Dressing Change Due 03/18/22 03/09/22 1700           Plan:     No orders of the defined types were placed in this encounter.          Follow up in about 9 days (around 3/18/2022) for wound care.

## 2022-03-15 ENCOUNTER — PATIENT MESSAGE (OUTPATIENT)
Dept: ADMINISTRATIVE | Facility: HOSPITAL | Age: 57
End: 2022-03-15
Payer: COMMERCIAL

## 2022-03-15 ENCOUNTER — PATIENT OUTREACH (OUTPATIENT)
Dept: ADMINISTRATIVE | Facility: HOSPITAL | Age: 57
End: 2022-03-15
Payer: COMMERCIAL

## 2022-03-17 DIAGNOSIS — I10 ESSENTIAL HYPERTENSION: ICD-10-CM

## 2022-03-17 DIAGNOSIS — N18.6 CKD (CHRONIC KIDNEY DISEASE) REQUIRING CHRONIC DIALYSIS: ICD-10-CM

## 2022-03-17 DIAGNOSIS — Z99.2 CKD (CHRONIC KIDNEY DISEASE) REQUIRING CHRONIC DIALYSIS: ICD-10-CM

## 2022-03-17 LAB
HLA DQA1 1: NORMAL
HLA DQA1 2: NORMAL
HLA DRB4 1: NORMAL
HLA-A 1 SERO. EQUIV: 2
HLA-A 1: NORMAL
HLA-A 2 SERO. EQUIV: 23
HLA-A 2: NORMAL
HLA-B 1 SERO. EQUIV: 62
HLA-B 1: NORMAL
HLA-B 2 SERO. EQUIV: 81
HLA-B 2: NORMAL
HLA-BW 1 SERO. EQUIV: 6
HLA-BW 2 SERO. EQUIV: NORMAL
HLA-C 1: NORMAL
HLA-C 2: NORMAL
HLA-CW 1 SERO. EQUIV: 10
HLA-CW 2 SERO. EQUIV: 8
HLA-DPA1 1: NORMAL
HLA-DPA1 2: NORMAL
HLA-DPB1 1: NORMAL
HLA-DPB1 2: NORMAL
HLA-DQ 1 SERO. EQUIV: 8
HLA-DQ 2 SERO. EQUIV: 5
HLA-DQB1 1: NORMAL
HLA-DQB1 2: NORMAL
HLA-DRB1 1 SERO. EQUIV: 4
HLA-DRB1 1: NORMAL
HLA-DRB1 2 SERO. EQUIV: 14
HLA-DRB1 2: NORMAL
HLA-DRB3 1: NORMAL
HLA-DRB3 2: NORMAL
HLA-DRB345 1 SERO. EQUIV: 52
HLA-DRB345 2 SERO. EQUIV: 53
HLA-DRB4 2: NORMAL
HLA-DRB5 1: NORMAL
HLA-DRB5 2: NORMAL
SSALL INTERPRETATION: NORMAL
SSALL TESTING DATE: NORMAL
SSDPA TESTING DATE: NORMAL
SSDPB TESTING DATE: NORMAL
SSDQA TESTING DATE: NORMAL
SSDQB TESTING DATE: NORMAL
SSDRB TESTING DATE: NORMAL
SSOA TESTING DATE: NORMAL
SSOB TESTING DATE: NORMAL
SSOC TESTING DATE: NORMAL
SSODR TESTING DATE: NORMAL

## 2022-03-17 RX ORDER — NIFEDIPINE 60 MG/1
60 TABLET, EXTENDED RELEASE ORAL DAILY
Qty: 90 TABLET | Refills: 0 | Status: SHIPPED | OUTPATIENT
Start: 2022-03-17 | End: 2022-06-17

## 2022-03-18 ENCOUNTER — HOSPITAL ENCOUNTER (OUTPATIENT)
Dept: WOUND CARE | Facility: HOSPITAL | Age: 57
Discharge: HOME | End: 2022-03-18
Attending: PODIATRIST
Payer: COMMERCIAL

## 2022-03-18 VITALS — HEIGHT: 72 IN | TEMPERATURE: 99 F | WEIGHT: 250 LBS | BODY MASS INDEX: 33.86 KG/M2

## 2022-03-18 DIAGNOSIS — N18.6 ESRD (END STAGE RENAL DISEASE): ICD-10-CM

## 2022-03-18 DIAGNOSIS — L97.422 DIABETIC ULCER OF LEFT MIDFOOT ASSOCIATED WITH TYPE 2 DIABETES MELLITUS, WITH FAT LAYER EXPOSED: Primary | ICD-10-CM

## 2022-03-18 DIAGNOSIS — E11.49 TYPE II DIABETES MELLITUS WITH NEUROLOGICAL MANIFESTATIONS: ICD-10-CM

## 2022-03-18 DIAGNOSIS — E11.621 DIABETIC ULCER OF LEFT MIDFOOT ASSOCIATED WITH TYPE 2 DIABETES MELLITUS, WITH FAT LAYER EXPOSED: Primary | ICD-10-CM

## 2022-03-18 PROCEDURE — 11042 DBRDMT SUBQ TIS 1ST 20SQCM/<: CPT | Performed by: PODIATRIST

## 2022-03-18 PROCEDURE — 99499 UNLISTED E&M SERVICE: CPT | Mod: ,,, | Performed by: PODIATRIST

## 2022-03-18 PROCEDURE — 11042 WOUND DEBRIDEMENT: ICD-10-PCS | Mod: ,,, | Performed by: PODIATRIST

## 2022-03-18 PROCEDURE — 27201912 HC WOUND CARE DEBRIDEMENT SUPPLIES: Performed by: PODIATRIST

## 2022-03-18 PROCEDURE — 11042 DBRDMT SUBQ TIS 1ST 20SQCM/<: CPT | Mod: ,,, | Performed by: PODIATRIST

## 2022-03-18 PROCEDURE — 99499 NO LOS: ICD-10-PCS | Mod: ,,, | Performed by: PODIATRIST

## 2022-03-18 NOTE — PROGRESS NOTES
[facility]                Progress Note    Subjective:       Patient ID: Catalino Mcfadden is a 56 y.o. male.    Chief Complaint: No chief complaint on file.    Walked to clinic unaided. Following last encounter patient went out of town to visit family.  admits to some excess ambulation but relates he attemtoetd to add cushion to wound site. Has been elevating [place]. Dsg dry and scant brownish drainage Iodosorb plug in wound.      Review of Systems   Constitutional: Negative for appetite change, chills and fever.   Respiratory: Negative for cough, shortness of breath and wheezing.    Cardiovascular: Positive for leg swelling. Negative for chest pain.   Gastrointestinal: Negative for diarrhea, nausea and vomiting.   Musculoskeletal: Positive for arthralgias, joint swelling and myalgias.   Skin: Positive for wound.   Neurological: Positive for numbness and headaches. Negative for weakness.        + paresthesia          Objective:        Physical Exam  Vitals and nursing note reviewed.   Constitutional:       General: He is not in acute distress.     Appearance: He is not toxic-appearing or diaphoretic.      Comments: Pt. is well-developed, well-nourished, appears stated age, in no acute distress, alert and oriented x 3. No evidence of depression, anxiety, or agitation. Calm, cooperative, and communicative. Appropriate interactions and affect.   Cardiovascular:      Pulses:           Dorsalis pedis pulses are 2+ on the right side and 2+ on the left side.        Posterior tibial pulses are 1+ on the right side and 1+ on the left side.      Comments: There is decreased digital hair. Skin is atrophic, slightly hyperpigmented  Pulmonary:      Effort: No respiratory distress.   Musculoskeletal:      Right ankle: No swelling. No tenderness. No lateral malleolus, medial malleolus, AITF ligament, CF ligament or posterior TF ligament tenderness. Normal range of  motion.      Right Achilles Tendon: No defects. Hilliard's test negative.      Left ankle: No swelling. No tenderness. No lateral malleolus, medial malleolus, AITF ligament, CF ligament or posterior TF ligament tenderness. Normal range of motion.      Left Achilles Tendon: No defects. Hilliard's test negative.      Right foot: No tenderness or bony tenderness.      Left foot: No tenderness or bony tenderness.      Comments: Decreased stride, station of gait.  apropulsive toe off.  Increased angle and base of gait.    There is equinus deformity bilateral with decreased dorsiflexion at the ankle joint bilateral. No tenderness with compression of heel. Negative tinels sign. Gait analysis reveals excessive pronation through midstance and propulsion with early heel off.     Patient has hammertoes of digits 2-5 bilateral partially reducible     Decreased first MPJ range of motion both weightbearing and nonweightbearing, no crepitus observed the first MP joint, + dorsal flag sign.     Visible and palpable bunion without pain at dorsomedial 1st metatarsal head right and left.  Hallux abducted right and left partially reducible, tracks laterally without being track bound.  No ecchymosis, erythema, edema, or cardinal signs infection or signs of trauma same foot.    Fat pad atrophy to heels and met heads bilateral    Plantarflexed 1st ray RLE   Lymphadenopathy:      Comments: No lymphatic streaking    Negative lymphadenopathy bilateral popliteal fossa and tarsal tunnel.     Skin:     General: Skin is warm and dry.      Coloration: Skin is not pale.      Findings: Erythema and lesion (see wound description below) present. No abrasion, ecchymosis, laceration or rash.      Nails: There is no clubbing.      Comments: Toenails 1-5 bilaterally discolored/yellowed, dystrophic, brittle with subungual debris.    Neurological:      Sensory: Sensory deficit present.      Comments:  [place] 5.07 monofilamant testing is diminished  Kp feet. Decreased/absent vibratory sensation bilateral feet to 128Hz tuning fork.     Paresthesias, and hyperesthesia bilateral feet with no clearly identified trigger or source.           Psychiatric:         Attention and Perception: He is attentive.         Mood and Affect: Mood is not anxious. Affect is not inappropriate.         Speech: He is communicative. Speech is not slurred.         Behavior: Behavior is not combative.           Vitals:    03/18/22 0805   Temp: 98.5 °F (36.9 °C)       Assessment:           ICD-10-CM ICD-9-CM   1. Diabetic ulcer of left midfoot associated with type 2 diabetes mellitus, with fat layer exposed  E11.621 250.80    L97.422 707.14   2. Type II diabetes mellitus with neurological manifestations  E11.49 250.60   3. ESRD (end stage renal disease)  N18.6 585.6            Wound 03/04/22 0814 Diabetic Ulcer Left lateral;plantar Foot (Active)   03/04/22 0814    Pre-existing:    Primary Wound Type: Diabetic ulc   Side: Left   Orientation: lateral;plantar   Location: Foot   Wound Number:    Ankle-Brachial Index:    Pulses:    Removal Indication and Assessment:    Wound Outcome:    (Retired) Wound Type:    (Retired) Wound Length (cm):    (Retired) Wound Width (cm):    (Retired) Depth (cm):    Wound Description (Comments):    Removal Indications:    Wound Image   03/18/22 0800   Wound WDL ex 03/18/22 0800   Dressing Appearance Dry;Intact 03/18/22 0800   Drainage Amount Scant 03/18/22 0800   Drainage Characteristics/Odor Serosanguineous 03/18/22 0800   Tissue loss description Partial thickness 03/18/22 0800   Red (%), Wound Tissue Color 100 % 03/18/22 0800   Periwound Area Dry;Intact 03/18/22 0800   Wound Edges Callused 03/18/22 0800   Wound Length (cm) 0.7 cm 03/18/22 0800   Wound Width (cm) 0.7 cm 03/18/22 0800   Wound Depth (cm) 0.3 cm 03/18/22 0800   Wound Volume (cm^3) 0.147 cm^3 03/18/22 0800   Wound Surface Area (cm^2) 0.49 cm^2 03/18/22 0800   Care Cleansed with:;Antimicrobial  agent;Sterile normal saline 03/18/22 0800   Dressing Changed 03/18/22 0800   Periwound Care Moisture barrier applied 03/18/22 0800   Compression Two layer compression 03/18/22 0800   Off Loading Off loading shoe 03/18/22 0800   Dressing Change Due 03/25/22 03/18/22 0800           Plan:            Base of wound red with scant blood after plug removed. Regression noted to wound dimension s/p debridement    Wound Debridement    Date/Time: 3/18/2022 7:55 AM  Performed by: Aviva Martinez DPM  Authorized by: Aviva Martinez DPM     Consent Done?:  Yes (Written)  Local anesthesia used?: No      Wound Details:    Location:  Left foot    Location:  Left Midfoot    Type of Debridement:  Excisional       Length (cm):  0.7       Area (sq cm):  0.49       Width (cm):  0.7       Percent Debrided (%):  100       Depth (cm):  0.3       Total Area Debrided (sq cm):  0.49    Depth of debridement:  Subcutaneous tissue    Tissue debrided:  Dermis, Epidermis and Subcutaneous    Devitalized tissue debrided:  Callus, Fibrin and Biofilm    Instruments:  Curette    Bleeding:  Minimal  Hemostasis Achieved: Yes    Method Used:  Pressure  Patient tolerance:  Patient tolerated the procedure well with no immediate complications        Orders Placed This Encounter   Procedures    Debridement     This order was created via procedure documentation     Standing Status:   Standing     Number of Occurrences:   1    Change dressing     Clean with NS. Gentian violet to periwound. Cavilon/benzoin to plantar foot. Apply iodoflex/endoform to wound bed. Custom fit felt foam with small hole over wound. Mextra short x1 secured with medipore tape. Football dressing (cast padding x3), coflex calmine (apply calamine layer on skin before placing cast padding). [facility]        Follow up in about 1 week (around 3/25/2022).

## 2022-03-25 ENCOUNTER — HOSPITAL ENCOUNTER (OUTPATIENT)
Dept: WOUND CARE | Facility: HOSPITAL | Age: 57
Discharge: HOME | End: 2022-03-25
Attending: PODIATRIST
Payer: COMMERCIAL

## 2022-03-25 VITALS
TEMPERATURE: 98 F | DIASTOLIC BLOOD PRESSURE: 80 MMHG | HEART RATE: 87 BPM | SYSTOLIC BLOOD PRESSURE: 160 MMHG | RESPIRATION RATE: 20 BRPM

## 2022-03-25 DIAGNOSIS — E11.621 DIABETIC ULCER OF LEFT MIDFOOT ASSOCIATED WITH TYPE 2 DIABETES MELLITUS, WITH FAT LAYER EXPOSED: Primary | ICD-10-CM

## 2022-03-25 DIAGNOSIS — E11.49 TYPE II DIABETES MELLITUS WITH NEUROLOGICAL MANIFESTATIONS: ICD-10-CM

## 2022-03-25 DIAGNOSIS — L97.422 DIABETIC ULCER OF LEFT MIDFOOT ASSOCIATED WITH TYPE 2 DIABETES MELLITUS, WITH FAT LAYER EXPOSED: Primary | ICD-10-CM

## 2022-03-25 DIAGNOSIS — N18.6 ESRD (END STAGE RENAL DISEASE): ICD-10-CM

## 2022-03-25 PROCEDURE — 27201912 HC WOUND CARE DEBRIDEMENT SUPPLIES: Performed by: PODIATRIST

## 2022-03-25 PROCEDURE — 11042 DEBRIDEMENT: ICD-10-PCS | Mod: ,,, | Performed by: PODIATRIST

## 2022-03-25 PROCEDURE — 99499 UNLISTED E&M SERVICE: CPT | Mod: ,,, | Performed by: PODIATRIST

## 2022-03-25 PROCEDURE — 11042 DBRDMT SUBQ TIS 1ST 20SQCM/<: CPT | Mod: ,,, | Performed by: PODIATRIST

## 2022-03-25 PROCEDURE — 99499 NO LOS: ICD-10-PCS | Mod: ,,, | Performed by: PODIATRIST

## 2022-03-25 PROCEDURE — 11042 DBRDMT SUBQ TIS 1ST 20SQCM/<: CPT | Performed by: PODIATRIST

## 2022-03-25 NOTE — PROGRESS NOTES
[facility]                Progress Note    Subjective:       Patient ID: Catalino Mcfadden is a 56 y.o. male.    Chief Complaint: Wound Check    F/u wound care visit. Patient ambulatory to exam room with no c/o pain or discomfort at present. Wound dressing to LLE intact with scant amount of drainage noted to wound.      Review of Systems   Constitutional: Negative for appetite change, chills and fever.   Respiratory: Negative for cough, shortness of breath and wheezing.    Cardiovascular: Positive for leg swelling. Negative for chest pain.   Gastrointestinal: Negative for diarrhea, nausea and vomiting.   Musculoskeletal: Positive for arthralgias, joint swelling and myalgias.   Skin: Positive for wound.   Neurological: Positive for numbness and headaches. Negative for weakness.        + paresthesia          Objective:        Physical Exam  Vitals and nursing note reviewed.   Constitutional:       General: He is not in acute distress.     Appearance: He is not toxic-appearing or diaphoretic.      Comments: Pt. is well-developed, well-nourished, appears stated age, in no acute distress, alert and oriented x 3. No evidence of depression, anxiety, or agitation. Calm, cooperative, and communicative. Appropriate interactions and affect.   Cardiovascular:      Pulses:           Dorsalis pedis pulses are 2+ on the right side and 2+ on the left side.        Posterior tibial pulses are 1+ on the right side and 1+ on the left side.      Comments: There is decreased digital hair. Skin is atrophic, slightly hyperpigmented  Pulmonary:      Effort: No respiratory distress.   Musculoskeletal:      Right ankle: No swelling. No tenderness. No lateral malleolus, medial malleolus, AITF ligament, CF ligament or posterior TF ligament tenderness. Normal range of motion.      Right Achilles Tendon: No defects. Hilliard's test negative.      Left ankle: No swelling. No tenderness. No lateral  malleolus, medial malleolus, AITF ligament, CF ligament or posterior TF ligament tenderness. Normal range of motion.      Left Achilles Tendon: No defects. Hilliard's test negative.      Right foot: No tenderness or bony tenderness.      Left foot: No tenderness or bony tenderness.      Comments: Decreased stride, station of gait.  apropulsive toe off.  Increased angle and base of gait.    There is equinus deformity bilateral with decreased dorsiflexion at the ankle joint bilateral. No tenderness with compression of heel. Negative tinels sign. Gait analysis reveals excessive pronation through midstance and propulsion with early heel off.     Patient has hammertoes of digits 2-5 bilateral partially reducible     Decreased first MPJ range of motion both weightbearing and nonweightbearing, no crepitus observed the first MP joint, + dorsal flag sign.     Visible and palpable bunion without pain at dorsomedial 1st metatarsal head right and left.  Hallux abducted right and left partially reducible, tracks laterally without being track bound.  No ecchymosis, erythema, edema, or cardinal signs infection or signs of trauma same foot.    Fat pad atrophy to heels and met heads bilateral    Plantarflexed 1st ray RLE   Lymphadenopathy:      Comments: No lymphatic streaking    Negative lymphadenopathy bilateral popliteal fossa and tarsal tunnel.     Skin:     General: Skin is warm and dry.      Coloration: Skin is not pale.      Findings: Erythema and lesion (see wound description below) present. No abrasion, ecchymosis, laceration or rash.      Nails: There is no clubbing.      Comments: Toenails 1-5 bilaterally discolored/yellowed, dystrophic, brittle with subungual debris.    Neurological:      Sensory: Sensory deficit present.      Comments:  [place] 5.07 monofilamant testing is diminished Kp feet. Decreased/absent vibratory sensation bilateral feet to 128Hz tuning fork.     Paresthesias, and hyperesthesia bilateral  feet with no clearly identified trigger or source.           Psychiatric:         Attention and Perception: He is attentive.         Mood and Affect: Mood is not anxious. Affect is not inappropriate.         Speech: He is communicative. Speech is not slurred.         Behavior: Behavior is not combative.           Vitals:    03/25/22 0812   BP: (!) 160/80   Pulse: 87   Resp: 20   Temp: 97.7 °F (36.5 °C)       Assessment:           ICD-10-CM ICD-9-CM   1. Diabetic ulcer of left midfoot associated with type 2 diabetes mellitus, with fat layer exposed  E11.621 250.80    L97.422 707.14   2. Type II diabetes mellitus with neurological manifestations  E11.49 250.60   3. ESRD (end stage renal disease)  N18.6 585.6            Wound 03/04/22 0814 Diabetic Ulcer Left lateral;plantar Foot (Active)   03/04/22 0814    Pre-existing:    Primary Wound Type: Diabetic ulc   Side: Left   Orientation: lateral;plantar   Location: Foot   Wound Number:    Ankle-Brachial Index:    Pulses:    Removal Indication and Assessment:    Wound Outcome:    (Retired) Wound Type:    (Retired) Wound Length (cm):    (Retired) Wound Width (cm):    (Retired) Depth (cm):    Wound Description (Comments):    Removal Indications:    Wound Image    03/25/22 0800   Wound WDL ex 03/25/22 0800   Dressing Appearance Dry;Intact 03/25/22 0800   Drainage Amount Scant 03/25/22 0800   Appearance Other (see comments) 03/25/22 0800   Tissue loss description Partial thickness 03/25/22 0800   Black (%), Wound Tissue Color 0 % 03/25/22 0800   Red (%), Wound Tissue Color 100 % 03/25/22 0800   Yellow (%), Wound Tissue Color 0 % 03/25/22 0800   Periwound Area Intact;Dry 03/25/22 0800   Wound Edges Callused 03/25/22 0800   Wound Length (cm) 0.3 cm 03/25/22 0800   Wound Width (cm) 0.2 cm 03/25/22 0800   Wound Depth (cm) 0.3 cm 03/25/22 0800   Wound Volume (cm^3) 0.018 cm^3 03/25/22 0800   Wound Surface Area (cm^2) 0.06 cm^2 03/25/22 0800   Care Cleansed with:;Soap and  water;Sterile normal saline 03/25/22 0800   Dressing Changed 03/25/22 0800   Periwound Care Moisture barrier applied 03/25/22 0800   Compression Two layer compression 03/25/22 0800   Off Loading Football dressing;Off loading shoe 03/25/22 0800   Dressing Change Due 04/01/22 03/25/22 0800           Plan:            Debridement    Date/Time: 3/25/2022 2:36 PM  Performed by: Aviva Martinez DPM  Authorized by: Aviva Martinez DPM     Consent Done?:  Yes (Written)  Local anesthesia used?: No      Wound Details:    Location:  Left foot    Location:  Left Plantar    Type of Debridement:  Excisional       Length (cm):  0.3       Area (sq cm):  0.06       Width (cm):  0.2       Percent Debrided (%):  100       Depth (cm):  0.3       Total Area Debrided (sq cm):  0.06    Depth of debridement:  Subcutaneous tissue    Tissue debrided:  Epidermis, Subcutaneous and Dermis    Devitalized tissue debrided:  Callus and Fibrin    Instruments:  Curette    Bleeding:  Minimal  Hemostasis Achieved: Yes    Method Used:  Pressure  Patient tolerance:  Patient tolerated the procedure well with no immediate complications      Wound to left lateral plantar foot measuring smaller length and width.     Wound care done per order. RTC in one week.    Orders Placed This Encounter   Procedures    Debridement     This order was created via procedure documentation     Standing Status:   Standing     Number of Occurrences:   1    Change dressing     Clean with NS. Gentian violet to periwound. Cavilon/benzoin to plantar foot. Apply iodoflex/promogran (endoform unavailable) to wound bed. Custom fit felt foam with small hole over wound. Eran foam short x1 (mextra unavailable) secured with medipore tape. Football dressing (cast padding x3), coflex calmine (apply calamine layer on skin before placing cast padding). [facility]        Follow up in about 1 week (around 4/1/2022) for wound care.

## 2022-04-01 ENCOUNTER — HOSPITAL ENCOUNTER (OUTPATIENT)
Dept: WOUND CARE | Facility: HOSPITAL | Age: 57
Discharge: HOME | End: 2022-04-01
Attending: PODIATRIST
Payer: COMMERCIAL

## 2022-04-01 VITALS — HEART RATE: 70 BPM | TEMPERATURE: 98 F | DIASTOLIC BLOOD PRESSURE: 68 MMHG | SYSTOLIC BLOOD PRESSURE: 150 MMHG

## 2022-04-01 DIAGNOSIS — E11.49 TYPE II DIABETES MELLITUS WITH NEUROLOGICAL MANIFESTATIONS: ICD-10-CM

## 2022-04-01 DIAGNOSIS — E11.621 DIABETIC ULCER OF LEFT MIDFOOT ASSOCIATED WITH TYPE 2 DIABETES MELLITUS, WITH FAT LAYER EXPOSED: Primary | ICD-10-CM

## 2022-04-01 DIAGNOSIS — L97.422 DIABETIC ULCER OF LEFT MIDFOOT ASSOCIATED WITH TYPE 2 DIABETES MELLITUS, WITH FAT LAYER EXPOSED: Primary | ICD-10-CM

## 2022-04-01 DIAGNOSIS — N18.6 ESRD (END STAGE RENAL DISEASE): ICD-10-CM

## 2022-04-01 PROCEDURE — 99499 UNLISTED E&M SERVICE: CPT | Mod: ,,, | Performed by: PODIATRIST

## 2022-04-01 PROCEDURE — 11042 DEBRIDEMENT: ICD-10-PCS | Mod: ,,, | Performed by: PODIATRIST

## 2022-04-01 PROCEDURE — 99499 NO LOS: ICD-10-PCS | Mod: ,,, | Performed by: PODIATRIST

## 2022-04-01 PROCEDURE — 87070 CULTURE OTHR SPECIMN AEROBIC: CPT | Performed by: PODIATRIST

## 2022-04-01 PROCEDURE — 87186 SC STD MICRODIL/AGAR DIL: CPT | Performed by: PODIATRIST

## 2022-04-01 PROCEDURE — 11042 DBRDMT SUBQ TIS 1ST 20SQCM/<: CPT | Mod: ,,, | Performed by: PODIATRIST

## 2022-04-01 PROCEDURE — 27201912 HC WOUND CARE DEBRIDEMENT SUPPLIES: Performed by: PODIATRIST

## 2022-04-01 PROCEDURE — 87077 CULTURE AEROBIC IDENTIFY: CPT | Performed by: PODIATRIST

## 2022-04-01 PROCEDURE — 11042 DBRDMT SUBQ TIS 1ST 20SQCM/<: CPT | Performed by: PODIATRIST

## 2022-04-01 PROCEDURE — 87075 CULTR BACTERIA EXCEPT BLOOD: CPT | Performed by: PODIATRIST

## 2022-04-01 NOTE — PROGRESS NOTES
[facility]                Progress Note    Subjective:       Patient ID: Catalino Mcfadden is a 57 y.o. male.    Chief Complaint: No chief complaint on file.    Walked to clinic wearing cam boot. States only on feet from house to vehicle and from one vehicle to another otherwise is driving but feet dependent most of time.  Denies pain.  No new pedal complaints      Review of Systems   Constitutional: Negative for appetite change, chills and fever.   Respiratory: Negative for cough, shortness of breath and wheezing.    Cardiovascular: Positive for leg swelling. Negative for chest pain.   Gastrointestinal: Negative for diarrhea, nausea and vomiting.   Musculoskeletal: Positive for arthralgias, joint swelling and myalgias.   Skin: Positive for wound.   Neurological: Positive for numbness and headaches. Negative for weakness.        + paresthesia          Objective:        Physical Exam  Vitals and nursing note reviewed.   Constitutional:       General: He is not in acute distress.     Appearance: He is not toxic-appearing or diaphoretic.      Comments: Pt. is well-developed, well-nourished, appears stated age, in no acute distress, alert and oriented x 3. No evidence of depression, anxiety, or agitation. Calm, cooperative, and communicative. Appropriate interactions and affect.   Cardiovascular:      Pulses:           Dorsalis pedis pulses are 2+ on the right side and 2+ on the left side.        Posterior tibial pulses are 1+ on the right side and 1+ on the left side.      Comments: There is decreased digital hair. Skin is atrophic, slightly hyperpigmented  Pulmonary:      Effort: No respiratory distress.   Musculoskeletal:      Right ankle: No swelling. No tenderness. No lateral malleolus, medial malleolus, AITF ligament, CF ligament or posterior TF ligament tenderness. Normal range of motion.      Right Achilles Tendon: No defects. Hilliard's test negative.      Left ankle: No  swelling. No tenderness. No lateral malleolus, medial malleolus, AITF ligament, CF ligament or posterior TF ligament tenderness. Normal range of motion.      Left Achilles Tendon: No defects. Hilliard's test negative.      Right foot: No tenderness or bony tenderness.      Left foot: No tenderness or bony tenderness.      Comments: Decreased stride, station of gait.  apropulsive toe off.  Increased angle and base of gait.    There is equinus deformity bilateral with decreased dorsiflexion at the ankle joint bilateral. No tenderness with compression of heel. Negative tinels sign. Gait analysis reveals excessive pronation through midstance and propulsion with early heel off.     Patient has hammertoes of digits 2-5 bilateral partially reducible     Decreased first MPJ range of motion both weightbearing and nonweightbearing, no crepitus observed the first MP joint, + dorsal flag sign.     Visible and palpable bunion without pain at dorsomedial 1st metatarsal head right and left.  Hallux abducted right and left partially reducible, tracks laterally without being track bound.  No ecchymosis, erythema, edema, or cardinal signs infection or signs of trauma same foot.    Fat pad atrophy to heels and met heads bilateral    Plantarflexed 1st ray RLE   Lymphadenopathy:      Comments: No lymphatic streaking    Negative lymphadenopathy bilateral popliteal fossa and tarsal tunnel.     Skin:     General: Skin is warm and dry.      Coloration: Skin is not pale.      Findings: Erythema and lesion (see wound description below) present. No abrasion, ecchymosis, laceration or rash.      Nails: There is no clubbing.      Comments: Toenails 1-5 bilaterally discolored/yellowed, dystrophic, brittle with subungual debris.    Neurological:      Sensory: Sensory deficit present.      Comments:  [place] 5.07 monofilamant testing is diminished Kp feet. Decreased/absent vibratory sensation bilateral feet to 128Hz tuning fork.      Paresthesias, and hyperesthesia bilateral feet with no clearly identified trigger or source.           Psychiatric:         Attention and Perception: He is attentive.         Mood and Affect: Mood is not anxious. Affect is not inappropriate.         Speech: He is communicative. Speech is not slurred.         Behavior: Behavior is not combative.           Vitals:    04/01/22 0851   BP: (!) 150/68   Pulse: 70   Temp: 97.7 °F (36.5 °C)       Assessment:           ICD-10-CM ICD-9-CM   1. Diabetic ulcer of left midfoot associated with type 2 diabetes mellitus, with fat layer exposed  E11.621 250.80    L97.422 707.14   2. Type II diabetes mellitus with neurological manifestations  E11.49 250.60   3. ESRD (end stage renal disease)  N18.6 585.6            Wound 03/04/22 0814 Diabetic Ulcer Left lateral;plantar Foot (Active)   03/04/22 0814    Pre-existing:    Primary Wound Type: Diabetic ulc   Side: Left   Orientation: lateral;plantar   Location: Foot   Wound Number:    Ankle-Brachial Index:    Pulses:    Removal Indication and Assessment:    Wound Outcome:    (Retired) Wound Type:    (Retired) Wound Length (cm):    (Retired) Wound Width (cm):    (Retired) Depth (cm):    Wound Description (Comments):    Removal Indications:    Wound Image    04/01/22 0800   Wound WDL ex 04/01/22 0800   Dressing Appearance Dry;Intact;Clean 04/01/22 0800   Drainage Amount Scant 04/01/22 0800   Drainage Characteristics/Odor Serosanguineous 04/01/22 0800   Appearance Other (see comments) 04/01/22 0800   Tissue loss description Partial thickness 04/01/22 0800   Red (%), Wound Tissue Color 100 % 04/01/22 0800   Periwound Area Intact;Dry 04/01/22 0800   Wound Edges Callused 04/01/22 0800   Wound Length (cm) 0.6 cm 04/01/22 0800   Wound Width (cm) 0.6 cm 04/01/22 0800   Wound Depth (cm) 0.4 cm 04/01/22 0800   Wound Volume (cm^3) 0.144 cm^3 04/01/22 0800   Wound Surface Area (cm^2) 0.36 cm^2 04/01/22 0800   Care Cleansed with:;Antimicrobial  agent;Sterile normal saline 04/01/22 0800   Dressing Changed 04/01/22 0800   Compression Two layer compression 04/01/22 0800   Off Loading Football dressing 04/01/22 0800   Dressing Change Due 04/08/22 04/01/22 0800           Plan:            Wound covered with callus and debrided to reveal slightly bigger wound with mm deeper depth. Discussed how the recumbent position while driving is still a form of pressure    Cultures obtained      Debridement    Date/Time: 4/1/2022 7:50 AM  Performed by: Aviva Martinez DPM  Authorized by: Aviva Martinez DPM     Consent Done?:  Yes (Written)    Wound Details:    Location:  Left foot    Location:  Left Midfoot    Type of Debridement:  Excisional       Length (cm):  0.6       Area (sq cm):  0.36       Width (cm):  0.6       Percent Debrided (%):  100       Depth (cm):  0.4       Total Area Debrided (sq cm):  0.36    Depth of debridement:  Subcutaneous tissue    Tissue debrided:  Dermis, Epidermis and Subcutaneous    Devitalized tissue debrided:  Callus and Fibrin    Instruments:  Curette    Bleeding:  Minimal  Hemostasis Achieved: Yes    Method Used:  Pressure  Patient tolerance:  Patient tolerated the procedure well with no immediate complications            Orders Placed This Encounter   Procedures    Debridement     This order was created via procedure documentation     Standing Status:   Standing     Number of Occurrences:   1    Aerobic culture    Culture, Anaerobic    Change dressing     Comments    Clean with NS. Gentian violet to periwound. Cavilon/benzoin to plantar foot. Apply iodoflex/endoform to wound bed. Custom fit felt foam with small hole over wound. Eran foam long (mextra unavailable) . Football dressing (cast padding x3), coflex calmine (apply calamine layer on skin before placing cast padding). [facility]        Follow up in about 1 week (around 4/8/2022).

## 2022-04-04 DIAGNOSIS — L97.522 FOOT ULCERATION, LEFT, WITH FAT LAYER EXPOSED: ICD-10-CM

## 2022-04-04 DIAGNOSIS — E11.49 TYPE II DIABETES MELLITUS WITH NEUROLOGICAL MANIFESTATIONS: Primary | ICD-10-CM

## 2022-04-04 LAB — BACTERIA SPEC AEROBE CULT: ABNORMAL

## 2022-04-04 RX ORDER — DOXYCYCLINE HYCLATE 100 MG
100 TABLET ORAL 2 TIMES DAILY
Qty: 20 TABLET | Refills: 0 | Status: SHIPPED | OUTPATIENT
Start: 2022-04-04 | End: 2023-03-24

## 2022-04-05 ENCOUNTER — PATIENT MESSAGE (OUTPATIENT)
Dept: WOUND CARE | Facility: HOSPITAL | Age: 57
End: 2022-04-05
Payer: COMMERCIAL

## 2022-04-05 LAB — BACTERIA SPEC ANAEROBE CULT: NORMAL

## 2022-04-06 ENCOUNTER — TELEPHONE (OUTPATIENT)
Dept: WOUND CARE | Facility: HOSPITAL | Age: 57
End: 2022-04-06
Payer: COMMERCIAL

## 2022-04-06 NOTE — TELEPHONE ENCOUNTER
----- Message from Aviva Martinez DPM sent at 4/4/2022 12:37 PM CDT -----  Culture result positive for bacteria.  Rx for Doxycycline 2 times per day for 10 days sent to his pharmacy

## 2022-04-08 ENCOUNTER — HOSPITAL ENCOUNTER (OUTPATIENT)
Dept: WOUND CARE | Facility: HOSPITAL | Age: 57
Discharge: HOME | End: 2022-04-08
Attending: PODIATRIST
Payer: COMMERCIAL

## 2022-04-08 VITALS — HEART RATE: 90 BPM | DIASTOLIC BLOOD PRESSURE: 109 MMHG | SYSTOLIC BLOOD PRESSURE: 220 MMHG | TEMPERATURE: 98 F

## 2022-04-08 DIAGNOSIS — N18.6 ESRD (END STAGE RENAL DISEASE): ICD-10-CM

## 2022-04-08 DIAGNOSIS — E11.49 TYPE II DIABETES MELLITUS WITH NEUROLOGICAL MANIFESTATIONS: ICD-10-CM

## 2022-04-08 DIAGNOSIS — E11.621 DIABETIC ULCER OF LEFT MIDFOOT ASSOCIATED WITH TYPE 2 DIABETES MELLITUS, WITH FAT LAYER EXPOSED: Primary | ICD-10-CM

## 2022-04-08 DIAGNOSIS — L97.422 DIABETIC ULCER OF LEFT MIDFOOT ASSOCIATED WITH TYPE 2 DIABETES MELLITUS, WITH FAT LAYER EXPOSED: Primary | ICD-10-CM

## 2022-04-08 PROCEDURE — 11042 WOUND DEBRIDEMENT: ICD-10-PCS | Mod: ,,, | Performed by: PODIATRIST

## 2022-04-08 PROCEDURE — 99499 UNLISTED E&M SERVICE: CPT | Mod: ,,, | Performed by: PODIATRIST

## 2022-04-08 PROCEDURE — 11042 DBRDMT SUBQ TIS 1ST 20SQCM/<: CPT | Performed by: PODIATRIST

## 2022-04-08 PROCEDURE — 11042 DBRDMT SUBQ TIS 1ST 20SQCM/<: CPT | Mod: ,,, | Performed by: PODIATRIST

## 2022-04-08 PROCEDURE — 99499 NO LOS: ICD-10-PCS | Mod: ,,, | Performed by: PODIATRIST

## 2022-04-08 PROCEDURE — 27201912 HC WOUND CARE DEBRIDEMENT SUPPLIES: Performed by: PODIATRIST

## 2022-04-08 NOTE — PROGRESS NOTES
[facility]                Progress Note    Subjective:       Patient ID: Catalino Mcfadden is a 57 y.o. male.    Chief Complaint: No chief complaint on file.    Walked to clinic unaided wearing Darco. Dressing intact. Wound covered over with callus and states does not walk much but  Understands every step he takes involves formation of callus will try wearing Darco to bed so that not walking without shoe.  /109 recommended going to ER aware risks elected to go home and take Beta blockers that he has not taken in 2 days. Understands importance of taking meds daily. Recommended sending message  to cardiologist on [facility].        Review of Systems   Constitutional: Negative for appetite change, chills and fever.   Respiratory: Negative for cough, shortness of breath and wheezing.    Cardiovascular: Positive for leg swelling. Negative for chest pain.   Gastrointestinal: Negative for diarrhea, nausea and vomiting.   Musculoskeletal: Positive for arthralgias, joint swelling and myalgias.   Skin: Positive for wound.   Neurological: Positive for numbness and headaches. Negative for weakness.        + paresthesia          Objective:        Physical Exam  Vitals and nursing note reviewed.   Constitutional:       General: He is not in acute distress.     Appearance: He is not toxic-appearing or diaphoretic.      Comments: Pt. is well-developed, well-nourished, appears stated age, in no acute distress, alert and oriented x 3. No evidence of depression, anxiety, or agitation. Calm, cooperative, and communicative. Appropriate interactions and affect.   Cardiovascular:      Pulses:           Dorsalis pedis pulses are 2+ on the right side and 2+ on the left side.        Posterior tibial pulses are 1+ on the right side and 1+ on the left side.      Comments: There is decreased digital hair. Skin is atrophic, slightly hyperpigmented  Pulmonary:      Effort: No respiratory distress.  Received request via: Pharmacy    Was the patient seen in the last year in this department? Yes    Does the patient have an active prescription (recently filled or refills available) for medication(s) requested? No       Musculoskeletal:      Right ankle: No swelling. No tenderness. No lateral malleolus, medial malleolus, AITF ligament, CF ligament or posterior TF ligament tenderness. Normal range of motion.      Right Achilles Tendon: No defects. Hilliard's test negative.      Left ankle: No swelling. No tenderness. No lateral malleolus, medial malleolus, AITF ligament, CF ligament or posterior TF ligament tenderness. Normal range of motion.      Left Achilles Tendon: No defects. Hilliard's test negative.      Right foot: No tenderness or bony tenderness.      Left foot: No tenderness or bony tenderness.      Comments: Decreased stride, station of gait.  apropulsive toe off.  Increased angle and base of gait.    There is equinus deformity bilateral with decreased dorsiflexion at the ankle joint bilateral. No tenderness with compression of heel. Negative tinels sign. Gait analysis reveals excessive pronation through midstance and propulsion with early heel off.     Patient has hammertoes of digits 2-5 bilateral partially reducible     Decreased first MPJ range of motion both weightbearing and nonweightbearing, no crepitus observed the first MP joint, + dorsal flag sign.     Visible and palpable bunion without pain at dorsomedial 1st metatarsal head right and left.  Hallux abducted right and left partially reducible, tracks laterally without being track bound.  No ecchymosis, erythema, edema, or cardinal signs infection or signs of trauma same foot.    Fat pad atrophy to heels and met heads bilateral    Plantarflexed 1st ray RLE   Lymphadenopathy:      Comments: No lymphatic streaking    Negative lymphadenopathy bilateral popliteal fossa and tarsal tunnel.     Skin:     General: Skin is warm and dry.      Coloration: Skin is not pale.      Findings: Erythema and lesion (see wound description below) present. No abrasion, ecchymosis, laceration or rash.      Nails: There is no clubbing.      Comments: Toenails 1-5 bilaterally  discolored/yellowed, dystrophic, brittle with subungual debris.    Neurological:      Sensory: Sensory deficit present.      Comments:  [place] 5.07 monofilamant testing is diminished Kp feet. Decreased/absent vibratory sensation bilateral feet to 128Hz tuning fork.     Paresthesias, and hyperesthesia bilateral feet with no clearly identified trigger or source.           Psychiatric:         Attention and Perception: He is attentive.         Mood and Affect: Mood is not anxious. Affect is not inappropriate.         Speech: He is communicative. Speech is not slurred.         Behavior: Behavior is not combative.           Vitals:    04/08/22 0906   BP: (!) 220/109   Pulse: 90   Temp: 97.7 °F (36.5 °C)       Assessment:           ICD-10-CM ICD-9-CM   1. Diabetic ulcer of left midfoot associated with type 2 diabetes mellitus, with fat layer exposed  E11.621 250.80    L97.422 707.14   2. Type II diabetes mellitus with neurological manifestations  E11.49 250.60   3. ESRD (end stage renal disease)  N18.6 585.6            Wound 03/04/22 0814 Diabetic Ulcer Left lateral;plantar Foot (Active)   03/04/22 0814    Pre-existing:    Primary Wound Type: Diabetic ulc   Side: Left   Orientation: lateral;plantar   Location: Foot   Wound Number:    Ankle-Brachial Index:    Pulses:    Removal Indication and Assessment:    Wound Outcome:    (Retired) Wound Type:    (Retired) Wound Length (cm):    (Retired) Wound Width (cm):    (Retired) Depth (cm):    Wound Description (Comments):    Removal Indications:    Wound Image    04/08/22 0800   Wound WDL ex 04/08/22 0800   Dressing Appearance Dry;Intact;Dried drainage 04/08/22 0800   Drainage Amount Scant 04/08/22 0800   Drainage Characteristics/Odor Serous 04/08/22 0800   Tissue loss description Partial thickness 04/08/22 0800   Red (%), Wound Tissue Color 100 % 04/08/22 0800   Periwound Area Intact;Dry 04/08/22 0800   Wound Edges Callused 04/08/22 0800   Wound Length (cm) 0.3 cm  04/08/22 0800   Wound Width (cm) 0.3 cm 04/08/22 0800   Wound Depth (cm) 0.3 cm 04/08/22 0800   Wound Volume (cm^3) 0.027 cm^3 04/08/22 0800   Wound Surface Area (cm^2) 0.09 cm^2 04/08/22 0800   Care Cleansed with:;Antimicrobial agent;Sterile normal saline 04/08/22 0800   Dressing Changed 04/08/22 0800   Compression Two layer compression 04/08/22 0800   Off Loading Football dressing 04/08/22 0800   Dressing Change Due 04/13/22 04/08/22 0800           Plan:            Wound Debridement    Date/Time: 4/8/2022 7:51 AM  Performed by: Aviva Martinez DPM  Authorized by: Aviva Martinez DPM     Consent Done?:  Yes (Written)  Local anesthesia used?: No      Wound Details:    Location:  Left foot    Location:  Left Plantar    Type of Debridement:  Excisional       Length (cm):  0.3       Area (sq cm):  0.09       Width (cm):  0.3       Percent Debrided (%):  100       Depth (cm):  0.3       Total Area Debrided (sq cm):  0.09    Depth of debridement:  Subcutaneous tissue    Tissue debrided:  Dermis and Epidermis    Devitalized tissue debrided:  Callus    Instruments:  Curette    Bleeding:  Minimal  Hemostasis Achieved: Yes    Method Used:  Pressure  Patient tolerance:  Patient tolerated the procedure well with no immediate complications        Orders Placed This Encounter   Procedures    Debridement     This order was created via procedure documentation     Standing Status:   Standing     Number of Occurrences:   1    Change dressing     Clean with NS. Gentian violet to periwound ( none available ). Cavilon/benzoin to plantar foot. Apply iodoflex/endoform to wound bed. Custom fit felt foam with small hole over wound. Eran foam long (mextra unavailable) . Football dressing (cast padding x3), coflex calmine (apply calamine layer on skin before placing cast padding). Carol Ann        Follow up in about 1 week (around 4/15/2022).

## 2022-04-13 ENCOUNTER — HOSPITAL ENCOUNTER (OUTPATIENT)
Dept: WOUND CARE | Facility: HOSPITAL | Age: 57
Discharge: HOME | End: 2022-04-13
Attending: FAMILY MEDICINE
Payer: COMMERCIAL

## 2022-04-13 VITALS — HEART RATE: 88 BPM | DIASTOLIC BLOOD PRESSURE: 73 MMHG | SYSTOLIC BLOOD PRESSURE: 125 MMHG | TEMPERATURE: 98 F

## 2022-04-13 DIAGNOSIS — L97.422 DIABETIC ULCER OF LEFT MIDFOOT ASSOCIATED WITH TYPE 2 DIABETES MELLITUS, WITH FAT LAYER EXPOSED: Primary | ICD-10-CM

## 2022-04-13 DIAGNOSIS — E11.621 DIABETIC ULCER OF LEFT MIDFOOT ASSOCIATED WITH TYPE 2 DIABETES MELLITUS, WITH FAT LAYER EXPOSED: Primary | ICD-10-CM

## 2022-04-13 PROCEDURE — 99214 PR OFFICE/OUTPT VISIT, EST, LEVL IV, 30-39 MIN: ICD-10-PCS | Mod: ,,, | Performed by: FAMILY MEDICINE

## 2022-04-13 PROCEDURE — 11046 DBRDMT MUSC&/FSCA EA ADDL: CPT | Performed by: FAMILY MEDICINE

## 2022-04-13 PROCEDURE — 29581 APPL MULTLAYER CMPRN SYS LEG: CPT | Mod: LT | Performed by: FAMILY MEDICINE

## 2022-04-13 PROCEDURE — 99214 OFFICE O/P EST MOD 30 MIN: CPT | Mod: ,,, | Performed by: FAMILY MEDICINE

## 2022-04-13 NOTE — PROGRESS NOTES
[facility]                Progress Note    Subjective:       Patient ID: Catalino Mcfadden is a 57 y.o. male.    Chief Complaint: Wound Check    Bp 124/60 much improved from Friday states had been taking meds regularly. No pain Continues to drive long hours and when asked last week what could be causing  Wound to callus noticed that lies foot to side over wound while driving extra foam applied. Callus to foot and after removal looked unchanged and no sign of wound will follow up one more week to check with Dr Barr if healed.    This is an established patient in wound care.   Patient presents in the office today for evaluation of the chronic wound.  Updated HPI is noted below.    The periwound appears non-erythematous, no maceration noted, edematous    The wound appears wound healing    Patient denies fever, chills    Patients reports wound pain    Lymphedema status is contributory to wound status    Pain at the site of the wound is aching    Contributing factors to current wound state include numerous comorbid conditions, Diabetes Type 2, Lymphedema    Review of Systems   Constitutional: Negative for activity change, appetite change and fever.   HENT: Negative for congestion.    Respiratory: Negative for shortness of breath and wheezing.    Cardiovascular: Negative for chest pain and leg swelling.   Gastrointestinal: Negative for abdominal pain, nausea and vomiting.   Skin: Positive for wound.   Neurological: Negative for dizziness and headaches.   Psychiatric/Behavioral: The patient is not nervous/anxious.          Objective:        Physical Exam  Vitals and nursing note reviewed.   Constitutional:       Appearance: He is well-developed.   HENT:      Head: Normocephalic and atraumatic.   Eyes:      Conjunctiva/sclera: Conjunctivae normal.   Pulmonary:      Effort: Pulmonary effort is normal.   Abdominal:      General: There is no distension.      Palpations: Abdomen is  soft.   Musculoskeletal:         General: Normal range of motion.      Cervical back: Normal range of motion and neck supple.   Skin:     Comments: See wound description   Neurological:      Mental Status: He is alert and oriented to person, place, and time.   Psychiatric:         Behavior: Behavior normal.         Vitals:    04/13/22 1613   BP: 125/73   Pulse: 88   Temp: 98.2 °F (36.8 °C)       Assessment:           ICD-10-CM ICD-9-CM   1. Diabetic ulcer of left midfoot associated with type 2 diabetes mellitus, with fat layer exposed  E11.621 250.80    L97.422 707.14            Wound 03/04/22 0814 Diabetic Ulcer Left lateral;plantar Foot (Active)   03/04/22 0814    Pre-existing:    Primary Wound Type: Diabetic ulc   Side: Left   Orientation: lateral;plantar   Location: Foot   Wound Number:    Ankle-Brachial Index:    Pulses:    Removal Indication and Assessment:    Wound Outcome:    (Retired) Wound Type:    (Retired) Wound Length (cm):    (Retired) Wound Width (cm):    (Retired) Depth (cm):    Wound Description (Comments):    Removal Indications:    Wound WDL WDL 04/13/22 1700   Wound Length (cm) 0 cm 04/13/22 1700   Wound Width (cm) 0 cm 04/13/22 1700   Wound Depth (cm) 0 cm 04/13/22 1700   Wound Volume (cm^3) 0 cm^3 04/13/22 1700   Wound Surface Area (cm^2) 0 cm^2 04/13/22 1700   Care Cleansed with:;Antimicrobial agent;Sterile normal saline 04/13/22 1700   Dressing Changed 04/13/22 1700   Compression Two layer compression 04/13/22 1700   Off Loading Football dressing;Off loading shoe 04/13/22 1700   Dressing Change Due 04/20/22 04/13/22 1700           Plan:            1. Debridement not needed and Not Done today. Paring of wound by me in clinic.   2. Keep dressing clean and intact  3. Continue with wound care orders and plan as noted in orders.   4. Continue to follow current medication regimen as per pcp   5. Call for any questions / concerns.           Orders Placed This Encounter   Procedures    Change  dressing     Wound Dressing Orders    Dressing change frequency weekly  Remove old dressing  Cleanse or irrigate with: saline  Protect periwound with:  Primary dressing - adjust to fit: Endoform and Iodosorb  Secondary dressing: Mextra 2 long foams  Off-load: football  Compression: Other: calamine coflex calamine layer before football        Follow up in about 9 days (around 4/22/2022).

## 2022-04-22 ENCOUNTER — HOSPITAL ENCOUNTER (OUTPATIENT)
Dept: WOUND CARE | Facility: HOSPITAL | Age: 57
Discharge: HOME | End: 2022-04-22
Attending: PODIATRIST
Payer: COMMERCIAL

## 2022-04-22 ENCOUNTER — TELEPHONE (OUTPATIENT)
Dept: FAMILY MEDICINE | Facility: CLINIC | Age: 57
End: 2022-04-22
Payer: COMMERCIAL

## 2022-04-22 VITALS — DIASTOLIC BLOOD PRESSURE: 83 MMHG | HEART RATE: 62 BPM | TEMPERATURE: 98 F | SYSTOLIC BLOOD PRESSURE: 174 MMHG

## 2022-04-22 DIAGNOSIS — N18.6 ESRD (END STAGE RENAL DISEASE): ICD-10-CM

## 2022-04-22 DIAGNOSIS — E11.49 TYPE II DIABETES MELLITUS WITH NEUROLOGICAL MANIFESTATIONS: ICD-10-CM

## 2022-04-22 DIAGNOSIS — L97.422 DIABETIC ULCER OF LEFT MIDFOOT ASSOCIATED WITH TYPE 2 DIABETES MELLITUS, WITH FAT LAYER EXPOSED: Primary | ICD-10-CM

## 2022-04-22 DIAGNOSIS — E11.621 DIABETIC ULCER OF LEFT MIDFOOT ASSOCIATED WITH TYPE 2 DIABETES MELLITUS, WITH FAT LAYER EXPOSED: Primary | ICD-10-CM

## 2022-04-22 PROCEDURE — 99213 OFFICE O/P EST LOW 20 MIN: CPT | Mod: ,,, | Performed by: PODIATRIST

## 2022-04-22 PROCEDURE — 99213 PR OFFICE/OUTPT VISIT, EST, LEVL III, 20-29 MIN: ICD-10-PCS | Mod: ,,, | Performed by: PODIATRIST

## 2022-04-22 PROCEDURE — 29581 APPL MULTLAYER CMPRN SYS LEG: CPT

## 2022-04-22 NOTE — PROGRESS NOTES
[facility]                Progress Note    Subjective:       Patient ID: Catalino Mcfadden is a 57 y.o. male.    Chief Complaint: Wound Check    F/u wound care visit. Patient ambulatory to exam room with no c/o pain at present. Wound dressing intact.  Has been making an effort to pay attention the ways in which he may he inadvertently applyin pressure to the wound site.       Review of Systems   Constitutional: Negative for appetite change, chills and fever.   Respiratory: Negative for cough, shortness of breath and wheezing.    Cardiovascular: Positive for leg swelling. Negative for chest pain.   Gastrointestinal: Negative for diarrhea, nausea and vomiting.   Musculoskeletal: Positive for arthralgias, joint swelling and myalgias.   Skin: Positive for wound.   Neurological: Positive for numbness and headaches. Negative for weakness.        + paresthesia          Objective:        Physical Exam  Vitals and nursing note reviewed.   Constitutional:       General: He is not in acute distress.     Appearance: He is not toxic-appearing or diaphoretic.      Comments: Pt. is well-developed, well-nourished, appears stated age, in no acute distress, alert and oriented x 3. No evidence of depression, anxiety, or agitation. Calm, cooperative, and communicative. Appropriate interactions and affect.   Cardiovascular:      Pulses:           Dorsalis pedis pulses are 2+ on the right side and 2+ on the left side.        Posterior tibial pulses are 1+ on the right side and 1+ on the left side.      Comments: There is decreased digital hair. Skin is atrophic, slightly hyperpigmented  Pulmonary:      Effort: No respiratory distress.   Musculoskeletal:      Right ankle: No swelling. No tenderness. No lateral malleolus, medial malleolus, AITF ligament, CF ligament or posterior TF ligament tenderness. Normal range of motion.      Right Achilles Tendon: No defects. Hilliard's test negative.       Left ankle: No swelling. No tenderness. No lateral malleolus, medial malleolus, AITF ligament, CF ligament or posterior TF ligament tenderness. Normal range of motion.      Left Achilles Tendon: No defects. Hilliard's test negative.      Right foot: No tenderness or bony tenderness.      Left foot: No tenderness or bony tenderness.      Comments: Decreased stride, station of gait.  apropulsive toe off.  Increased angle and base of gait.    There is equinus deformity bilateral with decreased dorsiflexion at the ankle joint bilateral. No tenderness with compression of heel. Negative tinels sign. Gait analysis reveals excessive pronation through midstance and propulsion with early heel off.     Patient has hammertoes of digits 2-5 bilateral partially reducible     Decreased first MPJ range of motion both weightbearing and nonweightbearing, no crepitus observed the first MP joint, + dorsal flag sign.     Visible and palpable bunion without pain at dorsomedial 1st metatarsal head right and left.  Hallux abducted right and left partially reducible, tracks laterally without being track bound.  No ecchymosis, erythema, edema, or cardinal signs infection or signs of trauma same foot.    Fat pad atrophy to heels and met heads bilateral    Plantarflexed 1st ray RLE   Lymphadenopathy:      Comments: No lymphatic streaking    Negative lymphadenopathy bilateral popliteal fossa and tarsal tunnel.     Skin:     General: Skin is warm and dry.      Coloration: Skin is not pale.      Findings: Erythema and lesion (see wound description below) present. No abrasion, ecchymosis, laceration or rash.      Nails: There is no clubbing.      Comments: Toenails 1-5 bilaterally discolored/yellowed, dystrophic, brittle with subungual debris.    Neurological:      Sensory: Sensory deficit present.      Comments:  [place] 5.07 monofilamant testing is diminished Kp feet. Decreased/absent vibratory sensation bilateral feet to 128Hz tuning  fork.     Paresthesias, and hyperesthesia bilateral feet with no clearly identified trigger or source.           Psychiatric:         Attention and Perception: He is attentive.         Mood and Affect: Mood is not anxious. Affect is not inappropriate.         Speech: He is communicative. Speech is not slurred.         Behavior: Behavior is not combative.         Vitals:    04/22/22 0754   BP: (!) 174/83   Pulse: 62   Temp: 97.7 °F (36.5 °C)       Assessment:           ICD-10-CM ICD-9-CM   1. Diabetic ulcer of left midfoot associated with type 2 diabetes mellitus, with fat layer exposed  E11.621 250.80    L97.422 707.14   2. Type II diabetes mellitus with neurological manifestations  E11.49 250.60   3. ESRD (end stage renal disease)  N18.6 585.6            Wound 03/04/22 0814 Diabetic Ulcer Left lateral;plantar Foot (Active)   03/04/22 0814    Pre-existing:    Primary Wound Type: Diabetic ulc   Side: Left   Orientation: lateral;plantar   Location: Foot   Wound Number:    Ankle-Brachial Index:    Pulses:    Removal Indication and Assessment:    Wound Outcome:    (Retired) Wound Type:    (Retired) Wound Length (cm):    (Retired) Wound Width (cm):    (Retired) Depth (cm):    Wound Description (Comments):    Removal Indications:    Wound Image   04/22/22 0800   Dressing Appearance Intact;Dry 04/22/22 0800   Drainage Amount None 04/22/22 0800   Appearance Red;Epithelialization 04/22/22 0800   Tissue loss description Partial thickness 04/22/22 0800   Black (%), Wound Tissue Color 0 % 04/22/22 0800   Red (%), Wound Tissue Color 100 % 04/22/22 0800   Yellow (%), Wound Tissue Color 0 % 04/22/22 0800   Periwound Area Intact;Dry 04/22/22 0800   Wound Edges Callused 04/22/22 0800   Wound Length (cm) 0.1 cm 04/22/22 0800   Wound Width (cm) 0.1 cm 04/22/22 0800   Wound Depth (cm) 0.1 cm 04/22/22 0800   Wound Volume (cm^3) 0.001 cm^3 04/22/22 0800   Wound Surface Area (cm^2) 0.01 cm^2 04/22/22 0800   Care Cleansed with:;Soap and  water;Sterile normal saline 04/22/22 0800   Dressing Changed 04/22/22 0800   Compression Two layer compression 04/22/22 0800   Off Loading Football dressing 04/22/22 0800   Dressing Change Due 04/29/22 04/22/22 0800           Plan:                Wound to left foot improving, measuring smaller.       Orders Placed This Encounter   Procedures    Change dressing     Clean with NS. Apply Francesca to wound bed. Custom fit foam pad with small hole over wound to allow for drainage. Eran foam short x1. Football dressing (cast padding x3). Coflex Calamine.        Follow up in about 1 week (around 4/29/2022) for wound care visit.

## 2022-04-27 ENCOUNTER — TELEPHONE (OUTPATIENT)
Dept: FAMILY MEDICINE | Facility: CLINIC | Age: 57
End: 2022-04-27
Payer: COMMERCIAL

## 2022-04-29 ENCOUNTER — NURSE TRIAGE (OUTPATIENT)
Dept: ADMINISTRATIVE | Facility: CLINIC | Age: 57
End: 2022-04-29
Payer: COMMERCIAL

## 2022-04-29 ENCOUNTER — HOSPITAL ENCOUNTER (OUTPATIENT)
Dept: WOUND CARE | Facility: HOSPITAL | Age: 57
Discharge: HOME | End: 2022-04-29
Attending: PODIATRIST
Payer: COMMERCIAL

## 2022-04-29 VITALS — DIASTOLIC BLOOD PRESSURE: 70 MMHG | HEART RATE: 80 BPM | SYSTOLIC BLOOD PRESSURE: 150 MMHG | TEMPERATURE: 97 F

## 2022-04-29 DIAGNOSIS — N18.6 ESRD (END STAGE RENAL DISEASE): ICD-10-CM

## 2022-04-29 DIAGNOSIS — E11.49 TYPE II DIABETES MELLITUS WITH NEUROLOGICAL MANIFESTATIONS: ICD-10-CM

## 2022-04-29 DIAGNOSIS — E11.621 DIABETIC ULCER OF LEFT MIDFOOT ASSOCIATED WITH TYPE 2 DIABETES MELLITUS, WITH FAT LAYER EXPOSED: Primary | ICD-10-CM

## 2022-04-29 DIAGNOSIS — L97.422 DIABETIC ULCER OF LEFT MIDFOOT ASSOCIATED WITH TYPE 2 DIABETES MELLITUS, WITH FAT LAYER EXPOSED: Primary | ICD-10-CM

## 2022-04-29 PROCEDURE — 99214 OFFICE O/P EST MOD 30 MIN: CPT | Performed by: PODIATRIST

## 2022-04-29 PROCEDURE — 99213 PR OFFICE/OUTPT VISIT, EST, LEVL III, 20-29 MIN: ICD-10-PCS | Mod: ,,, | Performed by: PODIATRIST

## 2022-04-29 PROCEDURE — 99213 OFFICE O/P EST LOW 20 MIN: CPT | Mod: ,,, | Performed by: PODIATRIST

## 2022-04-29 NOTE — TELEPHONE ENCOUNTER
"[facility] NT incoming call -  Pt reports he is out of his Trulicity DM medication and has been out since march. Pt reports he has made several request for PA of med but pharmacy denies having received any "OK" for med through pt insurance.      1744 Triage nurse spoke  With [facility] for [facility]. [facility] reports pt has been waiting for authorization for a long time and would need MD office to call [phone] and speak with pt  Insurance to get PA completed.     NT returned call to pt and notified of above. Pt denies any symptoms to be triaged at this time.  No guideline protocol followed and pt advised to follow up with PCP when office is open. Encounter routed to PCP as high priority when office opens on Monday.     Reason for Disposition   Nursing judgment or information in reference    Protocols used: NO GUIDELINE [id number]      "

## 2022-04-29 NOTE — PROGRESS NOTES
[facility]                Progress Note    Subjective:       Patient ID: Catalino Mcfadden is a 57 y.o. male.    Chief Complaint: No chief complaint on file.    Walked to clinic unaided no drainage seen. He continues to work as a .  Plans to go on vacation in 2-3 weeks.  No new pedal complaints      Review of Systems   Constitutional: Negative for appetite change, chills and fever.   Respiratory: Negative for cough, shortness of breath and wheezing.    Cardiovascular: Positive for leg swelling. Negative for chest pain.   Gastrointestinal: Negative for diarrhea, nausea and vomiting.   Musculoskeletal: Positive for arthralgias, joint swelling and myalgias.   Skin: Positive for wound.   Neurological: Positive for numbness and headaches. Negative for weakness.        + paresthesia          Objective:        Physical Exam  Vitals and nursing note reviewed.   Constitutional:       General: He is not in acute distress.     Appearance: He is not toxic-appearing or diaphoretic.      Comments: Pt. is well-developed, well-nourished, appears stated age, in no acute distress, alert and oriented x 3. No evidence of depression, anxiety, or agitation. Calm, cooperative, and communicative. Appropriate interactions and affect.   Cardiovascular:      Pulses:           Dorsalis pedis pulses are 2+ on the right side and 2+ on the left side.        Posterior tibial pulses are 1+ on the right side and 1+ on the left side.      Comments: There is decreased digital hair. Skin is atrophic, slightly hyperpigmented  Pulmonary:      Effort: No respiratory distress.   Musculoskeletal:      Right ankle: No swelling. No tenderness. No lateral malleolus, medial malleolus, AITF ligament, CF ligament or posterior TF ligament tenderness. Normal range of motion.      Right Achilles Tendon: No defects. Hilliard's test negative.      Left ankle: No swelling. No tenderness. No lateral malleolus, medial  malleolus, AITF ligament, CF ligament or posterior TF ligament tenderness. Normal range of motion.      Left Achilles Tendon: No defects. Hilliard's test negative.      Right foot: No tenderness or bony tenderness.      Left foot: No tenderness or bony tenderness.      Comments: Decreased stride, station of gait.  apropulsive toe off.  Increased angle and base of gait.    There is equinus deformity bilateral with decreased dorsiflexion at the ankle joint bilateral. No tenderness with compression of heel. Negative tinels sign. Gait analysis reveals excessive pronation through midstance and propulsion with early heel off.     Patient has hammertoes of digits 2-5 bilateral partially reducible     Decreased first MPJ range of motion both weightbearing and nonweightbearing, no crepitus observed the first MP joint, + dorsal flag sign.     Visible and palpable bunion without pain at dorsomedial 1st metatarsal head right and left.  Hallux abducted right and left partially reducible, tracks laterally without being track bound.  No ecchymosis, erythema, edema, or cardinal signs infection or signs of trauma same foot.    Fat pad atrophy to heels and met heads bilateral    Plantarflexed 1st ray RLE   Lymphadenopathy:      Comments: No lymphatic streaking    Negative lymphadenopathy bilateral popliteal fossa and tarsal tunnel.     Skin:     General: Skin is warm and dry.      Coloration: Skin is not pale.      Findings: Erythema and lesion (see wound description below) present. No abrasion, ecchymosis, laceration or rash.      Nails: There is no clubbing.      Comments: Toenails 1-5 bilaterally discolored/yellowed, dystrophic, brittle with subungual debris.    Neurological:      Sensory: Sensory deficit present.      Comments:  Busy-Dayton 5.07 monofilamant testing is diminished Kp feet. Decreased/absent vibratory sensation bilateral feet to 128Hz tuning fork.     Paresthesias, and hyperesthesia bilateral feet with no  clearly identified trigger or source.           Psychiatric:         Attention and Perception: He is attentive.         Mood and Affect: Mood is not anxious. Affect is not inappropriate.         Speech: He is communicative. Speech is not slurred.         Behavior: Behavior is not combative.         Vitals:    04/29/22 0823   BP: (!) 150/70   Pulse: 80   Temp: 97.2 °F (36.2 °C)       Assessment:           ICD-10-CM ICD-9-CM   1. Diabetic ulcer of left midfoot associated with type 2 diabetes mellitus, with fat layer exposed  E11.621 250.80    L97.422 707.14   2. Type II diabetes mellitus with neurological manifestations  E11.49 250.60   3. ESRD (end stage renal disease)  N18.6 585.6            Wound 03/04/22 0814 Diabetic Ulcer Left lateral;plantar Foot (Active)   03/04/22 0814    Pre-existing:    Primary Wound Type: Diabetic ulc   Side: Left   Orientation: lateral;plantar   Location: Foot   Wound Number:    Ankle-Brachial Index:    Pulses:    Removal Indication and Assessment:    Wound Outcome:    (Retired) Wound Type:    (Retired) Wound Length (cm):    (Retired) Wound Width (cm):    (Retired) Depth (cm):    Wound Description (Comments):    Removal Indications:    Wound WDL WDL 04/29/22 0800   Dressing Appearance Dry;Intact 04/29/22 0800   Drainage Amount None 04/29/22 0800   Appearance Intact 04/29/22 0800   Wound Length (cm) 0 cm 04/29/22 0800   Wound Width (cm) 0 cm 04/29/22 0800   Wound Depth (cm) 0 cm 04/29/22 0800   Wound Volume (cm^3) 0 cm^3 04/29/22 0800   Wound Surface Area (cm^2) 0 cm^2 04/29/22 0800   Compression Tubular elasticized bandage 04/29/22 0800   Off Loading Football dressing 04/29/22 0800   Dressing Change Due 05/06/22 04/29/22 0800           Plan:            Greater than 50% of this visit spent on counseling and coordination of care.    Education about the prevention of limb loss.    Discussed wound healing cycle, skin integrity, ways to care for skin.Counseled patient on the effects of  PVD  and high blood glucose on healing. He verbalizes understanding that it can increase the chances of delayed healing and this prolonged exposure leads to infection or progression of infection which subsequently can result in loss of limb.    Adequate vitamin supplementation, protein intake, and hydration - discussed with patient    The wound is cleansed of foreign material as much as possible and the base inspected for bone or abscess. Base is thin epithelium and granulation without bone nor joint exposure there is a thin abrasion laterally     Short-term goals include maintaining good offloading and minimizing bioburden, promoting granulation and epithelialization to healing.  Long-term goals include keeping the wound healed by good offloading and medical management under the direction of internist.    Shoe inspection. Diabetic Foot Education. Patient reminded of the importance of good nutrition and blood sugar control to help prevent podiatric complications of diabetes. Patient instructed on proper foot hygeine. We discussed wearing proper shoe gear, daily foot inspections, never walking without protective shoe gear, never putting sharp instruments to feet.            Orders Placed This Encounter   Procedures    Change dressing     Francesca long Eran foam and 3 cast padding football secured with Tubigrip medium gradient in size D Darco        Follow up in about 1 week (around 5/6/2022).

## 2022-05-05 ENCOUNTER — TELEPHONE (OUTPATIENT)
Dept: FAMILY MEDICINE | Facility: CLINIC | Age: 57
End: 2022-05-05
Payer: COMMERCIAL

## 2022-05-05 NOTE — TELEPHONE ENCOUNTER
PA is needed for Trulicity. It was approved by [facility] however [employer] his primary insurance and it must be approved by them first. Advise pt to upload new insurance info or he can bring the card in so that registration can update. ([facility] ID# [id number])

## 2022-05-05 NOTE — TELEPHONE ENCOUNTER
----- Message from Maribell Khan sent at 5/5/2022 11:28 AM CDT -----  Type: Patient Call Back    Who called: self    What is the request in detail: Patient states he is having trouble getting his dulaglutide (TRULICITY) 0.75 mg/0.5 mL [facility] Patient requesting to speak with a nurse about this issue when available.     Can the clinic reply by [facility]? No     Would the patient rather a call back or a response via [facility]? Call back     Best call back number: [phone]

## 2022-05-06 ENCOUNTER — HOSPITAL ENCOUNTER (OUTPATIENT)
Dept: WOUND CARE | Facility: HOSPITAL | Age: 57
Discharge: HOME | End: 2022-05-06
Attending: PODIATRIST
Payer: COMMERCIAL

## 2022-05-06 VITALS
SYSTOLIC BLOOD PRESSURE: 120 MMHG | RESPIRATION RATE: 20 BRPM | TEMPERATURE: 97 F | DIASTOLIC BLOOD PRESSURE: 67 MMHG | HEART RATE: 68 BPM

## 2022-05-06 DIAGNOSIS — L97.422 DIABETIC ULCER OF LEFT MIDFOOT ASSOCIATED WITH TYPE 2 DIABETES MELLITUS, WITH FAT LAYER EXPOSED: Primary | ICD-10-CM

## 2022-05-06 DIAGNOSIS — N18.6 ESRD (END STAGE RENAL DISEASE): ICD-10-CM

## 2022-05-06 DIAGNOSIS — E11.621 DIABETIC ULCER OF LEFT MIDFOOT ASSOCIATED WITH TYPE 2 DIABETES MELLITUS, WITH FAT LAYER EXPOSED: Primary | ICD-10-CM

## 2022-05-06 DIAGNOSIS — E11.49 TYPE II DIABETES MELLITUS WITH NEUROLOGICAL MANIFESTATIONS: ICD-10-CM

## 2022-05-06 PROCEDURE — 99213 PR OFFICE/OUTPT VISIT, EST, LEVL III, 20-29 MIN: ICD-10-PCS | Mod: ,,, | Performed by: PODIATRIST

## 2022-05-06 PROCEDURE — 99213 OFFICE O/P EST LOW 20 MIN: CPT | Performed by: PODIATRIST

## 2022-05-06 PROCEDURE — 99213 OFFICE O/P EST LOW 20 MIN: CPT | Mod: ,,, | Performed by: PODIATRIST

## 2022-05-06 NOTE — PATIENT INSTRUCTIONS
Lotions:  Eucerin  Eucerin for Diabetics  Aquaphor  A&D Ointment  Gold Bond for Diabetics  Sween  Jah's Bees All Purpose Baby Ointment  Urea 40 with Aloe ([email or web address])    OTC Pain Creams:  Voltaren Gel  Biofreeze  Bengay  Omena [place]  Two Old Goat  Lidocaine Gel  Absorbine Veterinary Liniment Gel Topical Analgesic Sore Muscle and Joint Pain Relief    Shoes:  Oasics GT 2000 or gel foundations  New Balance- stability type shoes (940 series)  Saucony Stabil C3  Valencia GTS, Beast, Transcend  Hoka One Bondi7    Brands:  Propet  Sofft- for women  Axxiom- for women  Montelongo&Logan- for men  Sheyla  Croshubham  Aerosoles  Naturalizers  SAS  Ecco  Born  Zackery Geller  [facility]- dress shoes  Vionic  Birkenstocks  Fitflops  Atul

## 2022-05-06 NOTE — PROGRESS NOTES
[facility]                Progress Note    Subjective:       Patient ID: Catalino Mcfadden is a 57 y.o. male.    Chief Complaint: Wound Check    F/u wound care visit. Patient ambulatory to exam room with no c/o pain or discomfort at present. Wound dressing to right foot intact with no drainage noted. Wound to left lateral foot improving.       Review of Systems   Constitutional: Negative for appetite change, chills and fever.   Respiratory: Negative for cough, shortness of breath and wheezing.    Cardiovascular: Positive for leg swelling. Negative for chest pain.   Gastrointestinal: Negative for diarrhea, nausea and vomiting.   Musculoskeletal: Positive for arthralgias, joint swelling and myalgias.   Skin: Positive for wound.   Neurological: Positive for numbness and headaches. Negative for weakness.        + paresthesia          Objective:        Physical Exam  Vitals and nursing note reviewed.   Constitutional:       General: He is not in acute distress.     Appearance: He is not toxic-appearing or diaphoretic.      Comments: Pt. is well-developed, well-nourished, appears stated age, in no acute distress, alert and oriented x 3. No evidence of depression, anxiety, or agitation. Calm, cooperative, and communicative. Appropriate interactions and affect.   Cardiovascular:      Pulses:           Dorsalis pedis pulses are 2+ on the right side and 2+ on the left side.        Posterior tibial pulses are 1+ on the right side and 1+ on the left side.      Comments: There is decreased digital hair. Skin is atrophic, slightly hyperpigmented  Pulmonary:      Effort: No respiratory distress.   Musculoskeletal:      Right ankle: No swelling. No tenderness. No lateral malleolus, medial malleolus, AITF ligament, CF ligament or posterior TF ligament tenderness. Normal range of motion.      Right Achilles Tendon: No defects. Hilliard's test negative.      Left ankle: No swelling. No  tenderness. No lateral malleolus, medial malleolus, AITF ligament, CF ligament or posterior TF ligament tenderness. Normal range of motion.      Left Achilles Tendon: No defects. Hilliard's test negative.      Right foot: No tenderness or bony tenderness.      Left foot: No tenderness or bony tenderness.      Comments: Decreased stride, station of gait.  apropulsive toe off.  Increased angle and base of gait.    There is equinus deformity bilateral with decreased dorsiflexion at the ankle joint bilateral. No tenderness with compression of heel. Negative tinels sign. Gait analysis reveals excessive pronation through midstance and propulsion with early heel off.     Patient has hammertoes of digits 2-5 bilateral partially reducible     Decreased first MPJ range of motion both weightbearing and nonweightbearing, no crepitus observed the first MP joint, + dorsal flag sign.     Visible and palpable bunion without pain at dorsomedial 1st metatarsal head right and left.  Hallux abducted right and left partially reducible, tracks laterally without being track bound.  No ecchymosis, erythema, edema, or cardinal signs infection or signs of trauma same foot.    Fat pad atrophy to heels and met heads bilateral    Plantarflexed 1st ray RLE   Lymphadenopathy:      Comments: No lymphatic streaking    Negative lymphadenopathy bilateral popliteal fossa and tarsal tunnel.     Skin:     General: Skin is warm and dry.      Coloration: Skin is not pale.      Findings: Erythema and lesion (see wound description below) present. No abrasion, ecchymosis, laceration or rash.      Nails: There is no clubbing.      Comments: Toenails 1-5 bilaterally discolored/yellowed, dystrophic, brittle with subungual debris.    Neurological:      Sensory: Sensory deficit present.      Comments:  Velma-Dayton 5.07 monofilamant testing is diminished Kp feet. Decreased/absent vibratory sensation bilateral feet to 128Hz tuning fork.     Paresthesias, and  hyperesthesia bilateral feet with no clearly identified trigger or source.           Psychiatric:         Attention and Perception: He is attentive.         Mood and Affect: Mood is not anxious. Affect is not inappropriate.         Speech: He is communicative. Speech is not slurred.         Behavior: Behavior is not combative.         Vitals:    05/06/22 0927   BP: 120/67   Pulse: 68   Resp: 20   Temp: 97.3 °F (36.3 °C)       Assessment:           ICD-10-CM ICD-9-CM   1. Diabetic ulcer of left midfoot associated with type 2 diabetes mellitus, with fat layer exposed  E11.621 250.80    L97.422 707.14   2. Type II diabetes mellitus with neurological manifestations  E11.49 250.60   3. ESRD (end stage renal disease)  N18.6 585.6            Wound 03/04/22 0814 Diabetic Ulcer Left lateral;plantar Foot (Active)   03/04/22 0814    Pre-existing:    Primary Wound Type: Diabetic ulc   Side: Left   Orientation: lateral;plantar   Location: Foot   Wound Number:    Ankle-Brachial Index:    Pulses:    Removal Indication and Assessment:    Wound Outcome:    (Retired) Wound Type:    (Retired) Wound Length (cm):    (Retired) Wound Width (cm):    (Retired) Depth (cm):    Wound Description (Comments):    Removal Indications:    Wound Image   05/06/22 0900   Dressing Appearance Dry;Intact 05/06/22 0900   Drainage Amount None 05/06/22 0900   Appearance Red;Epithelialization 05/06/22 0900   Red (%), Wound Tissue Color 100 % 05/06/22 0900   Periwound Area Dry;Intact 05/06/22 0900   Wound Length (cm) 0.1 cm 05/06/22 0900   Wound Width (cm) 0.1 cm 05/06/22 0900   Wound Depth (cm) 0.1 cm 05/06/22 0900   Wound Volume (cm^3) 0.001 cm^3 05/06/22 0900   Wound Surface Area (cm^2) 0.01 cm^2 05/06/22 0900   Care Cleansed with:;Soap and water;Sterile normal saline 05/06/22 0900   Dressing Changed 05/06/22 0900   Periwound Care Skin barrier film applied 05/06/22 0900           Plan:            Wound has healed well with no signs of continued skin  breakdown noted. Skin is still delicate therefore patient must be diligent in avoiding excessive pressure and making sure there is adequate support and padding in shoe gear.       Wound care done per order. RTC in 2 weeks.    Orders Placed This Encounter   Procedures    Change dressing     Wound Dressing Orders    Dressing change frequency once a week  Remove old dressing  Cleanse or irrigate with: Normal Saline  Protect periwound with:  periwound: Cavilon   Primary dressing - adjust to fit: WOUND BASE: Promogran Francesca  Secondary dressing: Tegafoam  Off-load: Diabetic shoes  Compression: Tubi-        Follow up in about 2 weeks (around 5/20/2022) for wound care visit.

## 2022-05-20 ENCOUNTER — HOSPITAL ENCOUNTER (OUTPATIENT)
Dept: WOUND CARE | Facility: HOSPITAL | Age: 57
Discharge: HOME | End: 2022-05-20
Attending: PODIATRIST
Payer: COMMERCIAL

## 2022-05-20 VITALS
DIASTOLIC BLOOD PRESSURE: 120 MMHG | TEMPERATURE: 98 F | RESPIRATION RATE: 20 BRPM | SYSTOLIC BLOOD PRESSURE: 220 MMHG | HEART RATE: 85 BPM

## 2022-05-20 DIAGNOSIS — L97.422 DIABETIC ULCER OF LEFT MIDFOOT ASSOCIATED WITH TYPE 2 DIABETES MELLITUS, WITH FAT LAYER EXPOSED: Primary | ICD-10-CM

## 2022-05-20 DIAGNOSIS — E11.621 DIABETIC ULCER OF LEFT MIDFOOT ASSOCIATED WITH TYPE 2 DIABETES MELLITUS, WITH FAT LAYER EXPOSED: Primary | ICD-10-CM

## 2022-05-20 PROCEDURE — 99213 OFFICE O/P EST LOW 20 MIN: CPT | Performed by: PODIATRIST

## 2022-05-20 PROCEDURE — 99213 PR OFFICE/OUTPT VISIT, EST, LEVL III, 20-29 MIN: ICD-10-PCS | Mod: ,,, | Performed by: PODIATRIST

## 2022-05-20 PROCEDURE — 99213 OFFICE O/P EST LOW 20 MIN: CPT | Mod: ,,, | Performed by: PODIATRIST

## 2022-05-20 NOTE — PROGRESS NOTES
[facility]                Progress Note    Subjective:       Patient ID: Catalino Mcfadden is a 57 y.o. male.    Chief Complaint: Wound Check    F/u wound care visit. Patient ambulatory to exam room with no difficulty noted. Patient denies pain or discomfort. Patient's BP elevated, states he doesn't remember taking BP meds this morning or last night. Patient denies h/a, blurred vision or dizziness and refused to go to ED to be evaluated. Patient states he will take BP meds when he get in car. Following last encounter went out of town to visit family, plane travel. Wound dressing to left lateral foot intact with no drainage noted and measuring smaller.       Review of Systems   Constitutional: Negative for appetite change, chills and fever.   Respiratory: Negative for cough, shortness of breath and wheezing.    Cardiovascular: Positive for leg swelling. Negative for chest pain.   Gastrointestinal: Negative for diarrhea, nausea and vomiting.   Musculoskeletal: Positive for arthralgias, joint swelling and myalgias.   Skin: Positive for wound.   Neurological: Positive for numbness and headaches. Negative for weakness.        + paresthesia          Objective:        Physical Exam  Vitals and nursing note reviewed.   Constitutional:       General: He is not in acute distress.     Appearance: He is not toxic-appearing or diaphoretic.      Comments: Pt. is well-developed, well-nourished, appears stated age, in no acute distress, alert and oriented x 3. No evidence of depression, anxiety, or agitation. Calm, cooperative, and communicative. Appropriate interactions and affect.   Cardiovascular:      Pulses:           Dorsalis pedis pulses are 2+ on the right side and 2+ on the left side.        Posterior tibial pulses are 1+ on the right side and 1+ on the left side.      Comments: There is decreased digital hair. Skin is atrophic, slightly hyperpigmented  Pulmonary:      Effort: No  respiratory distress.   Musculoskeletal:      Right ankle: No swelling. No tenderness. No lateral malleolus, medial malleolus, AITF ligament, CF ligament or posterior TF ligament tenderness. Normal range of motion.      Right Achilles Tendon: No defects. Hilliard's test negative.      Left ankle: No swelling. No tenderness. No lateral malleolus, medial malleolus, AITF ligament, CF ligament or posterior TF ligament tenderness. Normal range of motion.      Left Achilles Tendon: No defects. Hilliard's test negative.      Right foot: No tenderness or bony tenderness.      Left foot: No tenderness or bony tenderness.      Comments: Decreased stride, station of gait.  apropulsive toe off.  Increased angle and base of gait.    There is equinus deformity bilateral with decreased dorsiflexion at the ankle joint bilateral. No tenderness with compression of heel. Negative tinels sign. Gait analysis reveals excessive pronation through midstance and propulsion with early heel off.     Patient has hammertoes of digits 2-5 bilateral partially reducible     Decreased first MPJ range of motion both weightbearing and nonweightbearing, no crepitus observed the first MP joint, + dorsal flag sign.     Visible and palpable bunion without pain at dorsomedial 1st metatarsal head right and left.  Hallux abducted right and left partially reducible, tracks laterally without being track bound.  No ecchymosis, erythema, edema, or cardinal signs infection or signs of trauma same foot.    Fat pad atrophy to heels and met heads bilateral    Plantarflexed 1st ray RLE   Lymphadenopathy:      Comments: No lymphatic streaking    Negative lymphadenopathy bilateral popliteal fossa and tarsal tunnel.     Skin:     General: Skin is warm and dry.      Coloration: Skin is not pale.      Findings: Erythema and lesion (see wound description below) present. No abrasion, ecchymosis, laceration or rash.      Nails: There is no clubbing.      Comments: Toenails  1-5 bilaterally discolored/yellowed, dystrophic, brittle with subungual debris.    Neurological:      Sensory: Sensory deficit present.      Comments:  [place]-Day[place] 5.07 monofilamant testing is diminished Kp feet. Decreased/absent vibratory sensation bilateral feet to 128Hz tuning fork.     Paresthesias, and hyperesthesia bilateral feet with no clearly identified trigger or source.           Psychiatric:         Attention and Perception: He is attentive.         Mood and Affect: Mood is not anxious. Affect is not inappropriate.         Speech: He is communicative. Speech is not slurred.         Behavior: Behavior is not combative.         Vitals:    05/20/22 0754   BP: (!) 220/120   Pulse: 85   Resp: 20   Temp: 97.7 °F (36.5 °C)       Assessment:           ICD-10-CM ICD-9-CM   1. Diabetic ulcer of left midfoot associated with type 2 diabetes mellitus, with fat layer exposed  E11.621 250.80    L97.422 707.14            Wound 03/04/22 0814 Diabetic Ulcer Left lateral;plantar Foot (Active)   03/04/22 0814    Pre-existing:    Primary Wound Type: Diabetic ulc   Side: Left   Orientation: lateral;plantar   Location: Foot   Wound Number:    Ankle-Brachial Index:    Pulses:    Removal Indication and Assessment:    Wound Outcome:    (Retired) Wound Type:    (Retired) Wound Length (cm):    (Retired) Wound Width (cm):    (Retired) Depth (cm):    Wound Description (Comments):    Removal Indications:    Wound Image   05/20/22 0700   Dressing Appearance Dry;Intact 05/20/22 0700   Drainage Amount None 05/20/22 0700   Appearance Epithelialization 05/20/22 0700   Periwound Area Dry;Intact 05/20/22 0700   Wound Edges Callused 05/20/22 0700   Wound Length (cm) 0.1 cm 05/20/22 0700   Wound Width (cm) 0.1 cm 05/20/22 0700   Wound Depth (cm) 0.1 cm 05/20/22 0700   Wound Volume (cm^3) 0.001 cm^3 05/20/22 0700   Wound Surface Area (cm^2) 0.01 cm^2 05/20/22 0700   Care Cleansed with:;Soap and water;Sterile normal saline 05/20/22 0700    Dressing Changed 05/20/22 0700   Periwound Care Skin barrier film applied 05/20/22 0700   Dressing Change Due 06/03/22 05/20/22 0700           Plan:              Greater than 50% of this visit spent on counseling and coordination of care.    Education about the prevention of limb loss.    Discussed wound healing cycle, skin integrity, ways to care for skin.Counseled patient on the effects of pressure and  blood glucose on healing. He verbalizes understanding that it can increase the chances of delayed healing and this prolonged exposure leads to infection or progression of infection which subsequently can result in loss of limb.    Adequate vitamin supplementation, protein intake, and hydration - discussed with patient      Follow-up: 2 weeks but should call [facility] immediately if any signs of infection, such as fever, chills, sweats, increased redness or pain.    Short-term goals include maintaining good offloading and minimizing bioburden, promoting granulation and epithelialization to healing.  Long-term goals include keeping the wound healed by good offloading and medical management under the direction of internist.    Shoe inspection. Long discussion about need for new shoes. Diabetic Foot Education. Patient reminded of the importance of good nutrition and blood sugar control to help prevent podiatric complications of diabetes. Patient instructed on proper foot hygeine. We discussed wearing proper shoe gear, daily foot inspections, never walking without protective shoe gear, never putting sharp instruments to feet.            Orders Placed This Encounter   Procedures    Change dressing     Clean with NS. Cavilon to periwound. U-shape pad. Betadine to wound. Tegafoam. RTC in 2 weeks.        Follow up in about 2 weeks (around 6/3/2022) for wound care visit.

## 2022-05-24 ENCOUNTER — TELEPHONE (OUTPATIENT)
Dept: FAMILY MEDICINE | Facility: CLINIC | Age: 57
End: 2022-05-24
Payer: COMMERCIAL

## 2022-05-24 NOTE — TELEPHONE ENCOUNTER
----- Message from Maribell Khan sent at 5/24/2022 12:13 PM CDT -----  Type: Patient Call Back    Who called: self     What is the request in detail: Patient needs to speak with a nurse about his medications when available. No further detail given.     Can the clinic reply by [facility]? No     Would the patient rather a call back or a response via [facility]? Call back     Best call back number: [phone]

## 2022-05-24 NOTE — TELEPHONE ENCOUNTER
Patient states that he has been w/o his medication for a month. Previously advised pt 05/05/2022 that we need a front and back copy of his [employer] insurance card and this can be upload in the patient portal. This information is needed to proceed with the PA.

## 2022-05-27 ENCOUNTER — TELEPHONE (OUTPATIENT)
Dept: FAMILY MEDICINE | Facility: CLINIC | Age: 57
End: 2022-05-27
Payer: COMMERCIAL

## 2022-05-27 NOTE — TELEPHONE ENCOUNTER
Contacted pt to verify address accosiated with ins card, [address] [facility] [address]. Was able tp submit PA and advised pt of turnaround response. He verbalized understanding

## 2022-05-27 NOTE — TELEPHONE ENCOUNTER
----- Message from Radhika Valencia sent at 5/27/2022 11:40 AM CDT -----  Regarding: self  .Type: Patient Call Back    Who called self     What is the request in detail: states he is still waiting for his auth for trulicity - has been a weeks without his meds. Please call     Can the clinic reply by [facility]? No     Would the patient rather a call back or a response via [facility]?  Call     Best call back number: .[phone]        [facility] #[id number] - [place] [id number] [facility] [address] [place] [address] [address]  [facility] [address]  Phone: [phone] Fax: [phone]

## 2022-05-27 NOTE — TELEPHONE ENCOUNTER
----- Message from Radhika Valencia sent at 5/27/2022 11:40 AM CDT -----  Regarding: self  .Type: Patient Call Back    Who called self     What is the request in detail: states he is still waiting for his auth for trulicity - has been a weeks without his meds. Please call     Can the clinic reply by [facility]? No     Would the patient rather a call back or a response via [facility]?  Call     Best call back number: .[phone]        [facility] #[id number] - [place] [id number] [facility] [address] [place] [address] [address]  [facility] [address]  Phone: [phone] Fax: [phone]

## 2022-05-28 NOTE — PATIENT INSTRUCTIONS
Medication list verified with verbal recall from patient. Wound has healed well with no signs of continued skin breakdown noted   Ok to transition into normal shoe gear at this time. Check feet daily for signs of drainage or lesion re-opening   Use of daily foot moisturizer to feet, avoiding the webspace's  Limit showers/bathing to roughly 15 minutes during the 1st few weeks after wound has healed to prevent skin breakdown     Recommend lotions: eucerin, eucerin for diabetics, aquaphor, A&D ointment, gold bond for diabetics, sween, Yates's Bees all purpose baby ointment,  urea 40 with aloe (found on amazon.com)    Shoe recommendations: (try 6pm.[employer], zappos.Sway [employer] , nordstromrackAlbeo Technologies, or shoes.[employer]way [employer] for discounted prices) you can visit [employer]SW shoes in [place]  or [employer] in the [facility] (there are also several shoe brand outlets in the [facility])    Asics (GT 2000 or gel foundations), new balance stability type shoes, sasapnaony (stabil c3),  Valencia (GTS or Beast or transcend), vionic, propet (tennis shoe)    sofft brand, clarks, crocs, aerosoles, naturalizers, SAS, ecco, born, pauline buck, rockports (dress shoes)    Vionic, burkenstocks, fitflops, propet (sandals)  Nike comfort thong sandals, crocs, propet (house shoes)    Nail Home remedy:  Vicks Vapor rub to nails for easier managability    Occasional soaks for 15-20 mins in luke warm water with 1 cup of listerine and 1 cup of apple cider vinegar are ok You may add several drops of oil of oregano or tea tree oil as well        Diabetes: Inspecting Your Feet  Diabetes increases your chances of developing foot problems. So inspect your feet every day. This helps you find small skin irritations before they become serious infections. If you have trouble seeing the bottoms of your feet, use a mirror or ask a family member or friend to help.     Pressure spots on the bottom of the foot are common areas where problems develop.   How to check your feet  Below are tips to help you look for foot problems. Try to check your feet at the  same time each day, such as when you get out of bed in the morning:  · Check the top of each foot. The tops of toes, back of the heel, and outer edge of the foot can get a lot of rubbing from poor-fitting shoes.  · Check the bottom of each foot. Daily wear and tear often leads to problems at pressure spots.  · Check the toes and nails. Fungal infections often occur between toes. Toenail problems can also be a sign of fungal infections or lead to breaks in the skin.  · Check your shoes, too. Loose objects inside a shoe can injure the foot. Use your hand to feel inside your shoes for things like james, loose stitching, or rough areas that could irritate your skin.  Warning signs  Look for any color changes in the foot. Redness with streaks can signal a severe infection, which needs immediate medical attention. Tell your doctor right away if you have any of these problems:  · Swelling, sometimes with color changes, may be a sign of poor blood flow or infection. Symptoms include tenderness and an increase in the size of your foot.  · Warm or hot areas on your feet may be signs of infection. A foot that is cold may not be getting enough blood.  · Sensations such as burning, tingling, or pins and needles can be signs of a problem. Also check for areas that may be numb.  · Hot spots are caused by friction or pressure. Look for hot spots in areas that get a lot of rubbing. Hot spots can turn into blisters, calluses, or sores.  · Cracks and sores are caused by dry or irritated skin. They are a sign that the skin is breaking down, which can lead to infection.  · Toenail problems to watch for include nails growing into the skin (ingrown toenail) and causing redness or pain. Thick, yellow, or discolored nails can signal a fungal infection.  · Drainage and odor can develop from untreated sores and ulcers. Call your doctor right away if you notice white or yellow drainage, bleeding, or unpleasant odor.   © 3169-7454 The  [employer]. [address] [place] [address]. All rights reserved. This information is not intended as a substitute for professional medical care. Always follow your healthcare professional's instructions.        Step-by-Step:  Inspecting Your Feet (Diabetes)    Date Last Reviewed: 10/1/2016  © 4077-2925 [employer]. [address] [place] [address]. All rights reserved. This information is not intended as a substitute for professional medical care. Always follow your healthcare professional's instructions.

## 2022-06-03 ENCOUNTER — HOSPITAL ENCOUNTER (OUTPATIENT)
Dept: WOUND CARE | Facility: HOSPITAL | Age: 57
Discharge: HOME | End: 2022-06-03
Attending: PODIATRIST
Payer: COMMERCIAL

## 2022-06-03 VITALS
TEMPERATURE: 98 F | RESPIRATION RATE: 16 BRPM | SYSTOLIC BLOOD PRESSURE: 167 MMHG | DIASTOLIC BLOOD PRESSURE: 80 MMHG | HEART RATE: 80 BPM

## 2022-06-03 DIAGNOSIS — L97.422 DIABETIC ULCER OF LEFT MIDFOOT ASSOCIATED WITH TYPE 2 DIABETES MELLITUS, WITH FAT LAYER EXPOSED: Primary | ICD-10-CM

## 2022-06-03 DIAGNOSIS — E11.49 TYPE II DIABETES MELLITUS WITH NEUROLOGICAL MANIFESTATIONS: ICD-10-CM

## 2022-06-03 DIAGNOSIS — E11.621 DIABETIC ULCER OF LEFT MIDFOOT ASSOCIATED WITH TYPE 2 DIABETES MELLITUS, WITH FAT LAYER EXPOSED: Primary | ICD-10-CM

## 2022-06-03 PROCEDURE — 99213 OFFICE O/P EST LOW 20 MIN: CPT | Mod: ,,, | Performed by: PODIATRIST

## 2022-06-03 PROCEDURE — 99213 PR OFFICE/OUTPT VISIT, EST, LEVL III, 20-29 MIN: ICD-10-PCS | Mod: ,,, | Performed by: PODIATRIST

## 2022-06-03 PROCEDURE — 99212 OFFICE O/P EST SF 10 MIN: CPT | Performed by: PODIATRIST

## 2022-06-03 NOTE — PROGRESS NOTES
[facility]                Progress Note    Subjective:       Patient ID: Catalino Mcfadden is a 57 y.o. male.    Chief Complaint: Wound Care    Follow up wound care visit. Patient ambulated to exam room unaided, with Podiatry approved sneakers on bilateral feet. No c/o pain at present. Dressing intact with no drainage noted.      Review of Systems   Constitutional: Negative for appetite change, chills and fever.   Respiratory: Negative for cough, shortness of breath and wheezing.    Cardiovascular: Positive for leg swelling. Negative for chest pain.   Gastrointestinal: Negative for diarrhea, nausea and vomiting.   Musculoskeletal: Positive for arthralgias, joint swelling and myalgias.   Skin: Positive for wound.   Neurological: Positive for numbness and headaches. Negative for weakness.        + paresthesia          Objective:        Physical Exam  Vitals and nursing note reviewed.   Constitutional:       General: He is not in acute distress.     Appearance: He is not toxic-appearing or diaphoretic.      Comments: Pt. is well-developed, well-nourished, appears stated age, in no acute distress, alert and oriented x 3. No evidence of depression, anxiety, or agitation. Calm, cooperative, and communicative. Appropriate interactions and affect.   Cardiovascular:      Pulses:           Dorsalis pedis pulses are 2+ on the right side and 2+ on the left side.        Posterior tibial pulses are 1+ on the right side and 1+ on the left side.      Comments: There is decreased digital hair. Skin is atrophic, slightly hyperpigmented  Pulmonary:      Effort: No respiratory distress.   Musculoskeletal:      Right ankle: No swelling. No tenderness. No lateral malleolus, medial malleolus, AITF ligament, CF ligament or posterior TF ligament tenderness. Normal range of motion.      Right Achilles Tendon: No defects. Hilliard's test negative.      Left ankle: No swelling. No tenderness. No lateral  malleolus, medial malleolus, AITF ligament, CF ligament or posterior TF ligament tenderness. Normal range of motion.      Left Achilles Tendon: No defects. Hilliard's test negative.      Right foot: No tenderness or bony tenderness.      Left foot: No tenderness or bony tenderness.      Comments: Decreased stride, station of gait.  apropulsive toe off.  Increased angle and base of gait.    There is equinus deformity bilateral with decreased dorsiflexion at the ankle joint bilateral. No tenderness with compression of heel. Negative tinels sign. Gait analysis reveals excessive pronation through midstance and propulsion with early heel off.     Patient has hammertoes of digits 2-5 bilateral partially reducible     Decreased first MPJ range of motion both weightbearing and nonweightbearing, no crepitus observed the first MP joint, + dorsal flag sign.     Visible and palpable bunion without pain at dorsomedial 1st metatarsal head right and left.  Hallux abducted right and left partially reducible, tracks laterally without being track bound.  No ecchymosis, erythema, edema, or cardinal signs infection or signs of trauma same foot.    Fat pad atrophy to heels and met heads bilateral    Plantarflexed 1st ray RLE   Lymphadenopathy:      Comments: No lymphatic streaking    Negative lymphadenopathy bilateral popliteal fossa and tarsal tunnel.     Skin:     General: Skin is warm and dry.      Coloration: Skin is not pale.      Findings: Erythema and lesion (see wound description below) present. No abrasion, ecchymosis, laceration or rash.      Nails: There is no clubbing.      Comments: Toenails 1-5 bilaterally discolored/yellowed, dystrophic, brittle with subungual debris.    Neurological:      Sensory: Sensory deficit present.      Comments:  Zapata-Dayton 5.07 monofilamant testing is diminished Kp feet. Decreased/absent vibratory sensation bilateral feet to 128Hz tuning fork.     Paresthesias, and hyperesthesia bilateral  feet with no clearly identified trigger or source.           Psychiatric:         Attention and Perception: He is attentive.         Mood and Affect: Mood is not anxious. Affect is not inappropriate.         Speech: He is communicative. Speech is not slurred.         Behavior: Behavior is not combative.         Vitals:    06/03/22 0737   BP: 121/63   Pulse: 72   Resp: 16   Temp: 97.7 °F (36.5 °C)       Assessment:           ICD-10-CM ICD-9-CM   1. Diabetic ulcer of left midfoot associated with type 2 diabetes mellitus, with fat layer exposed  E11.621 250.80    L97.422 707.14   2. Type II diabetes mellitus with neurological manifestations  E11.49 250.60       [REMOVED]      Wound 03/04/22 0814 Diabetic Ulcer Left lateral;plantar Foot (Removed)   03/04/22 0814    Pre-existing:    Primary Wound Type: Diabetic ulc   Side: Left   Orientation: lateral;plantar   Location: Foot   Wound Number:    Ankle-Brachial Index:    Pulses:    Removal Indication and Assessment:    Wound Outcome: Healed   (Retired) Wound Type:    (Retired) Wound Length (cm):    (Retired) Wound Width (cm):    (Retired) Depth (cm):    Wound Description (Comments):    Removal Indications:    Removed 06/03/22    Wound Image   06/03/22 0700   Wound WDL ex 06/03/22 0700   Dressing Appearance Intact;Dry 06/03/22 0700   Drainage Amount None 06/03/22 0700   Appearance Closed/resurfaced 06/03/22 0700   Tissue loss description Partial thickness 06/03/22 0700   Wound Edges Callused 06/03/22 0700   Wound Length (cm) 0 cm 06/03/22 0700   Wound Width (cm) 0 cm 06/03/22 0700   Wound Depth (cm) 0 cm 06/03/22 0700   Wound Volume (cm^3) 0 cm^3 06/03/22 0700   Wound Surface Area (cm^2) 0 cm^2 06/03/22 0700   Tunneling (depth (cm)/location) 0 06/03/22 0700   Undermining (depth (cm)/location) 0 06/03/22 0700   Care Cleansed with:;Antimicrobial agent;Sterile normal saline 06/03/22 0700   Dressing Changed 06/03/22 0700           Plan:             Wound has healed well with no  signs of continued skin breakdown noted. Ok to convert to normal shoe gear. Skin is still delicate therefore patient must be diligent in avoiding excessive pressure and making sure there is adequate support and padding in shoe gear.     Applied protective bandage (Tegafoam) to keep area protected. Patient discussed diabetic/podiatry approved creams to apply to feet. He will follow up with Podiatry in 3 weeks.           No orders of the defined types were placed in this encounter.       Follow up if symptoms worsen or fail to improve, for wound care.

## 2022-06-03 NOTE — PATIENT INSTRUCTIONS
Healed Wound Discharge Instructions    Congratulations your wound is healed.  Thank you for working with [facility] to reach this goal. Please note the following instructions:     1. You may apply moisturizing cream to the healed area.  Apply once a day as        needed for the next 14 days.         If you had a previous foot wound do not put moisturizer between your toes.    2. Do not allow the area to soak in water for an extended time period.        You can cleans the area with mild soap and water.         DO NOT USE PEROOXIDE      3. It will take several weeks for the tensile strength of your skin to reach 100%.        This is new skin.      4. Do not allow excessive moisture to accumulate in the area for the next 6      weeks.  Increased moisture will cause your new skin to breakdown.     5. Please monitor for edema (swelling) in the area of your former wound.  This      can cause your skin to breakdown and your wound will reopen.      Lotions:  Eucerin  Eucerin for Diabetics  Aquaphor  A&D Ointment  Gold Bond for Diabetics  Sween  Jah's Bees All Purpose Baby Ointment  Urea 40 with Aloe (amazon.com)    OTC Pain Creams:  Voltaren Gel  Biofreeze  Bengay  Bonifay Berlin  Two Old Goat  Lidocaine Gel  Absorbine Veterinary Liniment Gel Topical Analgesic Sore Muscle and Joint Pain Relief    Shoes:  Oasics GT 2000 or gel foundations  New Balance- stability type shoes (940 series)  Saucony Stabil C3  Valencia GTS, Beast, Transcend  Hoka One Bondi7    Brands:  Propet  Sofft- for women  Axxiom- for women  Reginald&Desmond- for men  Sheyla Suresh  Aerosoles  Naturalizers  SAS  Ecco  Born  Zackery Geller  [facility]- dress shoes  Vionic  Birkenstocks  Fitflops  Atul

## 2022-06-08 ENCOUNTER — TELEPHONE (OUTPATIENT)
Dept: FAMILY MEDICINE | Facility: CLINIC | Age: 57
End: 2022-06-08
Payer: COMMERCIAL

## 2022-06-14 DIAGNOSIS — Z99.2 ESRD ON HEMODIALYSIS: ICD-10-CM

## 2022-06-14 DIAGNOSIS — N18.6 ESRD ON HEMODIALYSIS: ICD-10-CM

## 2022-06-14 RX ORDER — SEVELAMER CARBONATE 800 MG/1
800 TABLET, FILM COATED ORAL
Qty: 90 TABLET | Refills: 11 | Status: SHIPPED | OUTPATIENT
Start: 2022-06-14 | End: 2024-01-03 | Stop reason: SDUPTHER

## 2022-06-16 ENCOUNTER — TELEPHONE (OUTPATIENT)
Dept: FAMILY MEDICINE | Facility: CLINIC | Age: 57
End: 2022-06-16
Payer: COMMERCIAL

## 2022-06-16 DIAGNOSIS — N18.5 TYPE 2 DIABETES MELLITUS WITH STAGE 5 CHRONIC KIDNEY DISEASE: Primary | ICD-10-CM

## 2022-06-16 DIAGNOSIS — E11.22 TYPE 2 DIABETES MELLITUS WITH STAGE 5 CHRONIC KIDNEY DISEASE: Primary | ICD-10-CM

## 2022-06-17 ENCOUNTER — OFFICE VISIT (OUTPATIENT)
Dept: FAMILY MEDICINE | Facility: CLINIC | Age: 57
End: 2022-06-17
Payer: COMMERCIAL

## 2022-06-17 VITALS
HEART RATE: 71 BPM | RESPIRATION RATE: 18 BRPM | OXYGEN SATURATION: 97 % | DIASTOLIC BLOOD PRESSURE: 80 MMHG | TEMPERATURE: 98 F | HEIGHT: 72 IN | SYSTOLIC BLOOD PRESSURE: 140 MMHG | WEIGHT: 249.56 LBS | BODY MASS INDEX: 33.8 KG/M2

## 2022-06-17 DIAGNOSIS — I10 ESSENTIAL HYPERTENSION: Chronic | ICD-10-CM

## 2022-06-17 DIAGNOSIS — N18.5 TYPE 2 DIABETES MELLITUS WITH STAGE 5 CHRONIC KIDNEY DISEASE: Primary | ICD-10-CM

## 2022-06-17 DIAGNOSIS — E11.22 TYPE 2 DIABETES MELLITUS WITH STAGE 5 CHRONIC KIDNEY DISEASE: Primary | ICD-10-CM

## 2022-06-17 DIAGNOSIS — I89.0 LYMPHEDEMA OF BOTH LOWER EXTREMITIES: ICD-10-CM

## 2022-06-17 PROBLEM — Z01.818 PRE-OPERATIVE EXAMINATION: Status: RESOLVED | Noted: 2020-07-16 | Resolved: 2022-06-17

## 2022-06-17 PROCEDURE — 3008F PR BODY MASS INDEX (BMI) DOCUMENTED: ICD-10-PCS | Mod: CPTII,S$GLB,, | Performed by: FAMILY MEDICINE

## 2022-06-17 PROCEDURE — 3044F HG A1C LEVEL LT 7.0%: CPT | Mod: CPTII,S$GLB,, | Performed by: FAMILY MEDICINE

## 2022-06-17 PROCEDURE — 3079F DIAST BP 80-89 MM HG: CPT | Mod: CPTII,S$GLB,, | Performed by: FAMILY MEDICINE

## 2022-06-17 PROCEDURE — 1160F RVW MEDS BY RX/DR IN RCRD: CPT | Mod: CPTII,S$GLB,, | Performed by: FAMILY MEDICINE

## 2022-06-17 PROCEDURE — 99214 PR OFFICE/OUTPT VISIT, EST, LEVL IV, 30-39 MIN: ICD-10-PCS | Mod: S$GLB,,, | Performed by: FAMILY MEDICINE

## 2022-06-17 PROCEDURE — 3077F SYST BP >= 140 MM HG: CPT | Mod: CPTII,S$GLB,, | Performed by: FAMILY MEDICINE

## 2022-06-17 PROCEDURE — 99214 OFFICE O/P EST MOD 30 MIN: CPT | Mod: S$GLB,,, | Performed by: FAMILY MEDICINE

## 2022-06-17 PROCEDURE — 1160F PR REVIEW ALL MEDS BY PRESCRIBER/CLIN PHARMACIST DOCUMENTED: ICD-10-PCS | Mod: CPTII,S$GLB,, | Performed by: FAMILY MEDICINE

## 2022-06-17 PROCEDURE — 3044F PR MOST RECENT HEMOGLOBIN A1C LEVEL <7.0%: ICD-10-PCS | Mod: CPTII,S$GLB,, | Performed by: FAMILY MEDICINE

## 2022-06-17 PROCEDURE — 99999 PR PBB SHADOW E&M-EST. PATIENT-LVL IV: CPT | Mod: PBBFAC,,, | Performed by: FAMILY MEDICINE

## 2022-06-17 PROCEDURE — 1159F PR MEDICATION LIST DOCUMENTED IN MEDICAL RECORD: ICD-10-PCS | Mod: CPTII,S$GLB,, | Performed by: FAMILY MEDICINE

## 2022-06-17 PROCEDURE — 3008F BODY MASS INDEX DOCD: CPT | Mod: CPTII,S$GLB,, | Performed by: FAMILY MEDICINE

## 2022-06-17 PROCEDURE — 3077F PR MOST RECENT SYSTOLIC BLOOD PRESSURE >= 140 MM HG: ICD-10-PCS | Mod: CPTII,S$GLB,, | Performed by: FAMILY MEDICINE

## 2022-06-17 PROCEDURE — 3079F PR MOST RECENT DIASTOLIC BLOOD PRESSURE 80-89 MM HG: ICD-10-PCS | Mod: CPTII,S$GLB,, | Performed by: FAMILY MEDICINE

## 2022-06-17 PROCEDURE — 99999 PR PBB SHADOW E&M-EST. PATIENT-LVL IV: ICD-10-PCS | Mod: PBBFAC,,, | Performed by: FAMILY MEDICINE

## 2022-06-17 PROCEDURE — 1159F MED LIST DOCD IN RCRD: CPT | Mod: CPTII,S$GLB,, | Performed by: FAMILY MEDICINE

## 2022-06-17 RX ORDER — TORSEMIDE 20 MG/1
TABLET ORAL
Qty: 30 TABLET | Refills: 5 | Status: SHIPPED | OUTPATIENT
Start: 2022-06-17 | End: 2022-10-25 | Stop reason: SDUPTHER

## 2022-06-17 RX ORDER — NIFEDIPINE 60 MG/1
1 TABLET, EXTENDED RELEASE ORAL
COMMUNITY
Start: 2021-11-20 | End: 2022-06-21

## 2022-06-17 RX ORDER — DULAGLUTIDE 0.75 MG/.5ML
INJECTION, SOLUTION SUBCUTANEOUS
Qty: 12 PEN | Refills: 0 | Status: ON HOLD | OUTPATIENT
Start: 2022-06-17 | End: 2023-12-05 | Stop reason: HOSPADM

## 2022-06-17 RX ORDER — TORSEMIDE 20 MG/1
1 TABLET ORAL
COMMUNITY
Start: 2021-11-27 | End: 2022-06-17

## 2022-06-17 NOTE — PROGRESS NOTES
Chief Complaint   Patient presents with    Follow-up       Catalino Mcfadden is a 57 y.o. male who presents per chief complain.  Chronic medical issues, if present, have been documented.  Acute medical issues, if present have been documented in the Chief Complaint.     Hypertension  This is a chronic problem. The current episode started more than 1 year ago. The problem is unchanged. The problem is controlled. Associated symptoms include PND. Pertinent negatives include no anxiety, blurred vision, chest pain, headaches, malaise/fatigue, neck pain, orthopnea, palpitations, peripheral edema, shortness of breath or sweats. There are no associated agents to hypertension. Risk factors for coronary artery disease include diabetes mellitus, dyslipidemia, male gender and obesity. Past treatments include beta blockers, calcium channel blockers, diuretics and ACE inhibitors. The current treatment provides moderate improvement. There are no compliance problems.  Hypertensive end-organ damage includes kidney disease and retinopathy. There is no history of angina, CAD/MI, CVA, heart failure, left ventricular hypertrophy or PVD. Identifiable causes of hypertension include chronic renal disease. There is no history of coarctation of the aorta, hyperaldosteronism, hypercortisolism, hyperparathyroidism, a hypertension causing med, pheochromocytoma, renovascular disease, sleep apnea or a thyroid problem.   Diabetes  He presents for his follow-up diabetic visit. He has type 2 diabetes mellitus. No MedicAlert identification noted. The initial diagnosis of diabetes was made 20 years ago. His disease course has been improving. Pertinent negatives for hypoglycemia include no confusion, dizziness, headaches, hunger, mood changes, nervousness/anxiousness, pallor, seizures, sleepiness, speech difficulty, sweats or tremors. Associated symptoms include fatigue and foot paresthesias. Pertinent negatives for diabetes include no blurred vision, no  chest pain, no foot ulcerations, no polydipsia, no polyphagia, no polyuria, no visual change, no weakness and no weight loss. There are no hypoglycemic complications. Pertinent negatives for hypoglycemia complications include no blackouts, no hospitalization, no nocturnal hypoglycemia, no required assistance and no required glucagon injection. Symptoms are improving. Diabetic complications include nephropathy, peripheral neuropathy and retinopathy. Pertinent negatives for diabetic complications include no autonomic neuropathy, CVA, heart disease or PVD. Risk factors for coronary artery disease include hypertension, obesity, sedentary lifestyle and diabetes mellitus. Current diabetic treatment includes diet and oral agent (dual therapy). He is compliant with treatment all of the time. His weight is decreasing steadily. He is following a generally healthy, low fat/cholesterol and low salt diet. Meal planning includes avoidance of concentrated sweets. He has not had a previous visit with a dietitian. He participates in exercise intermittently. He monitors blood glucose at home 1-2 x per day. He monitors urine at home <1 x per month. Blood glucose monitoring compliance is fair. His home blood glucose trend is decreasing steadily. An ACE inhibitor/angiotensin II receptor blocker is being taken. He sees a podiatrist.Eye exam is not current.     ROS  Review of Systems   Constitutional: Positive for fatigue. Negative for activity change, appetite change, chills, diaphoresis, fever, malaise/fatigue, unexpected weight change and weight loss.   HENT: Negative.  Negative for congestion, ear pain, hearing loss, nosebleeds, postnasal drip, rhinorrhea, sinus pressure, sneezing, sore throat and trouble swallowing.    Eyes: Negative for blurred vision, pain and visual disturbance.   Respiratory: Negative for cough, choking and shortness of breath.    Cardiovascular: Positive for leg swelling and PND. Negative for chest pain,  "palpitations and orthopnea.   Gastrointestinal: Negative for abdominal pain, constipation, diarrhea, nausea and vomiting.   Endocrine: Negative for polydipsia, polyphagia and polyuria.   Genitourinary: Negative for difficulty urinating, dysuria, frequency and urgency.   Musculoskeletal: Negative.  Negative for arthralgias, back pain, gait problem, joint swelling, myalgias and neck pain.   Skin: Negative.  Negative for pallor.   Allergic/Immunologic: Negative for environmental allergies and food allergies.   Neurological: Positive for light-headedness (occasional). Negative for dizziness, tremors, seizures, syncope, facial asymmetry, speech difficulty, weakness, numbness and headaches.   Psychiatric/Behavioral: Negative.  Negative for confusion, decreased concentration, dysphoric mood and sleep disturbance. The patient is not nervous/anxious.      Physical Exam  Vitals:    06/17/22 0818   BP: (!) 140/80   Pulse: 71   Resp: 18   Temp: 98.3 °F (36.8 °C)    Body mass index is 33.58 kg/m².  Weight: 113.2 kg (249 lb 9 oz)   Height: 6' 0.28" (183.6 cm)     Physical Exam  Constitutional:       General: He is not in acute distress.     Appearance: Normal appearance. He is well-developed. He is not ill-appearing, toxic-appearing or diaphoretic.   HENT:      Head: Normocephalic and atraumatic.      Right Ear: Hearing and external ear normal. No decreased hearing noted.      Left Ear: Hearing and external ear normal. No decreased hearing noted.      Nose: Nose normal. No nasal deformity or rhinorrhea.      Mouth/Throat:      Dentition: Normal dentition. Does not have dentures.      Pharynx: Uvula midline.   Eyes:      General: Lids are normal. No scleral icterus.        Right eye: No foreign body or discharge.         Left eye: No foreign body or discharge.      Conjunctiva/sclera: Conjunctivae normal.      Right eye: No chemosis or exudate.     Left eye: No chemosis or exudate.     Pupils: Pupils are equal, round, and reactive " to light.   Cardiovascular:      Rate and Rhythm: Normal rate and regular rhythm.      Heart sounds: Normal heart sounds, S1 normal and S2 normal. No murmur heard.    No friction rub. No gallop.   Pulmonary:      Effort: Pulmonary effort is normal. No accessory muscle usage or respiratory distress.      Breath sounds: Normal breath sounds. No decreased breath sounds, wheezing, rhonchi or rales.   Abdominal:      General: There is no distension.      Palpations: Abdomen is soft. Abdomen is not rigid.      Tenderness: There is no abdominal tenderness. There is no guarding or rebound.   Musculoskeletal:         General: Normal range of motion.      Cervical back: Full passive range of motion without pain, normal range of motion and neck supple.   Skin:     General: Skin is warm and dry.      Findings: No rash.   Neurological:      Mental Status: He is alert and oriented to person, place, and time.      Cranial Nerves: No cranial nerve deficit.      Sensory: No sensory deficit.      Motor: No abnormal muscle tone or seizure activity.      Coordination: Coordination normal.      Gait: Gait normal.   Psychiatric:         Attention and Perception: He is attentive.         Speech: Speech normal.         Behavior: Behavior normal. Behavior is cooperative.         Thought Content: Thought content normal.         Judgment: Judgment normal.     Assessment & Plan    Discussion of plan of care including treatment options regarding health and wellness were reviewed and discussed with patient.  Any changes to medication or treatment plan, as well as any screening blood test, imaging, or referrals to specialist, are documented.  Follow up as indicated.     1. Type 2 diabetes mellitus with stage 5 chronic kidney disease  Patient is encouraged to follow a diet low in carbohydrates and simple sugars.  Discussed simple vs. complex carbohydrates as well as eating times of certain meals. Advised to focus on good food choices and increased  physical activity and encouraged to adhere to medication regimen and/or lifestyle adjustments, and to check glucose level as recommended.  Contact office if glucose levels are not improving over time.  Will monitor HbA1c appropriately.    - dulaglutide (TRULICITY) 0.75 mg/0.5 mL pen injector; INJECT 0.5 ML UNDER THE SKIN EVERY 7 DAYS  Dispense: 12 pen; Refill: 0  - Basic Metabolic Panel; Future  - Hemoglobin A1C; Future    2. Essential hypertension  Patient was counseled and encouraged to maintain a low sodium diet, as well as increasing physical activity.  Recommend random BP checks at home on a regular basis.  Repeat BP at end of visit was not necessary. Will continue medication at this time, and follow up in 3-6 months, or sooner if blood pressure begins to increase.  Recommended holding Nifedipine due to lightheadedness and orthostatic hypotension.  Stop BB due to ED complaints.    3. Lymphedema of both lower extremities  Medication prescribed for use only as symptoms require.   - torsemide (DEMADEX) 20 MG Tab; Take once a day on non-HD days  Dispense: 30 tablet; Refill: 5       Follow up in about 3 months (around 9/17/2022).      ACTIVE MEDICAL ISSUES:  Documented in Problem List    PAST MEDICAL HISTORY  Documented    PAST SURGICAL HISTORY:  Documented    SOCIAL HISTORY:  Documented    FAMILY HISTORY:  Documented    ALLERGIES AND MEDICATIONS: updated and reviewed.  Documented    Health Maintenance       Date Due Completion Date    TETANUS VACCINE Never done ---    Shingles Vaccine (1 of 2) Never done ---    Eye Exam 04/03/2019 4/3/2018    COVID-19 Vaccine (4 - Booster for Pfizer series) 03/11/2022 11/11/2021    Pneumococcal Vaccines (Age 0-64) (3 - PPSV23 or PCV20) 08/16/2022 8/16/2021    Hemoglobin A1c 08/23/2022 2/23/2022    Foot Exam 02/08/2023 2/8/2022    Override on 5/25/2020: Done    Override on 4/27/2020: Done    Override on 4/20/2020: Done    Lipid Panel 02/23/2023 2/23/2022    Colorectal Cancer  Screening 12/26/2024 12/26/2021    Override on 9/26/2014: Done (future)

## 2022-06-17 NOTE — PROGRESS NOTES
Health Maintenance Due   Topic     TETANUS VACCINE      Shingles Vaccine (1 of 2)  no hx chicken pox. Notified pt can get vaccine at pharmacy    Eye Exam  Consult pcp    COVID-19 Vaccine (4 - Booster for Pfizer series)

## 2022-06-28 ENCOUNTER — OFFICE VISIT (OUTPATIENT)
Dept: PODIATRY | Facility: CLINIC | Age: 57
End: 2022-06-28
Payer: COMMERCIAL

## 2022-06-28 VITALS — HEIGHT: 72 IN | BODY MASS INDEX: 33.72 KG/M2 | WEIGHT: 249 LBS

## 2022-06-28 DIAGNOSIS — L97.512 FOOT ULCER, RIGHT, WITH FAT LAYER EXPOSED: ICD-10-CM

## 2022-06-28 DIAGNOSIS — L90.9 PLANTAR FAT PAD ATROPHY: ICD-10-CM

## 2022-06-28 DIAGNOSIS — E11.49 TYPE II DIABETES MELLITUS WITH NEUROLOGICAL MANIFESTATIONS: Primary | ICD-10-CM

## 2022-06-28 DIAGNOSIS — L97.522 FOOT ULCERATION, LEFT, WITH FAT LAYER EXPOSED: ICD-10-CM

## 2022-06-28 PROCEDURE — 99999 PR PBB SHADOW E&M-EST. PATIENT-LVL IV: ICD-10-PCS | Mod: PBBFAC,,, | Performed by: PODIATRIST

## 2022-06-28 PROCEDURE — 99999 PR PBB SHADOW E&M-EST. PATIENT-LVL IV: CPT | Mod: PBBFAC,,, | Performed by: PODIATRIST

## 2022-06-28 PROCEDURE — 99214 PR OFFICE/OUTPT VISIT, EST, LEVL IV, 30-39 MIN: ICD-10-PCS | Mod: S$GLB,ICN,, | Performed by: PODIATRIST

## 2022-06-28 PROCEDURE — 87075 CULTR BACTERIA EXCEPT BLOOD: CPT | Performed by: PODIATRIST

## 2022-06-28 PROCEDURE — 87070 CULTURE OTHR SPECIMN AEROBIC: CPT | Performed by: PODIATRIST

## 2022-06-28 PROCEDURE — 3044F PR MOST RECENT HEMOGLOBIN A1C LEVEL <7.0%: ICD-10-PCS | Mod: CPTII,S$GLB,ICN, | Performed by: PODIATRIST

## 2022-06-28 PROCEDURE — 3008F BODY MASS INDEX DOCD: CPT | Mod: CPTII,S$GLB,ICN, | Performed by: PODIATRIST

## 2022-06-28 PROCEDURE — 3008F PR BODY MASS INDEX (BMI) DOCUMENTED: ICD-10-PCS | Mod: CPTII,S$GLB,ICN, | Performed by: PODIATRIST

## 2022-06-28 PROCEDURE — 3044F HG A1C LEVEL LT 7.0%: CPT | Mod: CPTII,S$GLB,ICN, | Performed by: PODIATRIST

## 2022-06-28 PROCEDURE — 1159F MED LIST DOCD IN RCRD: CPT | Mod: CPTII,S$GLB,ICN, | Performed by: PODIATRIST

## 2022-06-28 PROCEDURE — 1160F PR REVIEW ALL MEDS BY PRESCRIBER/CLIN PHARMACIST DOCUMENTED: ICD-10-PCS | Mod: CPTII,S$GLB,ICN, | Performed by: PODIATRIST

## 2022-06-28 PROCEDURE — 99214 OFFICE O/P EST MOD 30 MIN: CPT | Mod: S$GLB,ICN,, | Performed by: PODIATRIST

## 2022-06-28 PROCEDURE — 1159F PR MEDICATION LIST DOCUMENTED IN MEDICAL RECORD: ICD-10-PCS | Mod: CPTII,S$GLB,ICN, | Performed by: PODIATRIST

## 2022-06-28 PROCEDURE — 1160F RVW MEDS BY RX/DR IN RCRD: CPT | Mod: CPTII,S$GLB,ICN, | Performed by: PODIATRIST

## 2022-06-29 ENCOUNTER — PATIENT MESSAGE (OUTPATIENT)
Dept: FAMILY MEDICINE | Facility: CLINIC | Age: 57
End: 2022-06-29
Payer: COMMERCIAL

## 2022-06-30 LAB — BACTERIA SPEC AEROBE CULT: NORMAL

## 2022-07-01 NOTE — PROGRESS NOTES
[facility]                Progress Note    Subjective:       Patient ID: Catalino Mcfadden is a 57 y.o. male.    Chief Complaint: Diabetes Mellitus, Wound Check, and Follow-up    Follow up wound care visit. Patient ambulated to exam room unaided, with Podiatry approved sneakers on bilateral feet. No c/o pain at present. Dressing intact with no drainage noted.    6/28/2022  Patietn presents to clinic for foot evaluation following discharge from wound clinic 3 weeks ago.  patient relates that approximately 2 weeks ago he noted blistering to medial 1st MTPJ bilaterally.  He believes that his new shoes were too tight and has since changed to a wider shoes.  He relates he waited for blisters to somewhat burst and he began treating with betadine.      Wound Check    Follow-up  Associated symptoms include arthralgias, headaches, joint swelling, myalgias and numbness. Pertinent negatives include no chest pain, chills, coughing, fever, nausea, vomiting or weakness.       Review of Systems   Constitutional: Negative for appetite change, chills and fever.   Respiratory: Negative for cough, shortness of breath and wheezing.    Cardiovascular: Positive for leg swelling. Negative for chest pain.   Gastrointestinal: Negative for diarrhea, nausea and vomiting.   Musculoskeletal: Positive for arthralgias, joint swelling and myalgias.   Skin: Positive for wound.   Neurological: Positive for numbness and headaches. Negative for weakness.        + paresthesia          Objective:     Vitals:    06/28/22 0800   Weight: 112.9 kg (249 lb)   Height: 6' (1.829 m)   PainSc: 0-No pain          Physical Exam  Vitals and nursing note reviewed.   Constitutional:       General: He is not in acute distress.     Appearance: He is not toxic-appearing or diaphoretic.      Comments: Pt. is well-developed, well-nourished, appears stated age, in no acute distress, alert and oriented x 3. No evidence of depression,  anxiety, or agitation. Calm, cooperative, and communicative. Appropriate interactions and affect.   Cardiovascular:      Pulses:           Dorsalis pedis pulses are 2+ on the right side and 2+ on the left side.        Posterior tibial pulses are 1+ on the right side and 1+ on the left side.      Comments: There is decreased digital hair. Skin is atrophic, slightly hyperpigmented  Pulmonary:      Effort: No respiratory distress.   Musculoskeletal:      Right ankle: No swelling. No tenderness. No lateral malleolus, medial malleolus, AITF ligament, CF ligament or posterior TF ligament tenderness. Normal range of motion.      Right Achilles Tendon: No defects. Hilliard's test negative.      Left ankle: No swelling. No tenderness. No lateral malleolus, medial malleolus, AITF ligament, CF ligament or posterior TF ligament tenderness. Normal range of motion.      Left Achilles Tendon: No defects. Hilliard's test negative.      Right foot: No tenderness or bony tenderness.      Left foot: No tenderness or bony tenderness.      Comments: Decreased stride, station of gait.  apropulsive toe off.  Increased angle and base of gait.    There is equinus deformity bilateral with decreased dorsiflexion at the ankle joint bilateral. No tenderness with compression of heel. Negative tinels sign. Gait analysis reveals excessive pronation through midstance and propulsion with early heel off.     Patient has hammertoes of digits 2-5 bilateral partially reducible     Decreased first MPJ range of motion both weightbearing and nonweightbearing, no crepitus observed the first MP joint, + dorsal flag sign.     Visible and palpable bunion without pain at dorsomedial 1st metatarsal head right and left.  Hallux abducted right and left partially reducible, tracks laterally without being track bound.  No ecchymosis, erythema, edema, or cardinal signs infection or signs of trauma same foot.    Fat pad atrophy to heels and met heads  bilateral    Plantarflexed 1st ray RLE   Lymphadenopathy:      Comments: No lymphatic streaking    Negative lymphadenopathy bilateral popliteal fossa and tarsal tunnel.     Skin:     General: Skin is warm and dry.      Coloration: Skin is not pale.      Findings: Erythema and lesion (see wound description below) present. No abrasion, ecchymosis, laceration or rash.      Nails: There is no clubbing.      Comments: Toenails 1-5 bilaterally discolored/yellowed, dystrophic, brittle with subungual debris.    Neurological:      Sensory: Sensory deficit present.      Comments:  [place] 5.07 monofilamant testing is diminished Kp feet. Decreased/absent vibratory sensation bilateral feet to 128Hz tuning fork.     Paresthesias, and hyperesthesia bilateral feet with no clearly identified trigger or source.           Psychiatric:         Attention and Perception: He is attentive.         Mood and Affect: Mood is not anxious. Affect is not inappropriate.         Speech: He is communicative. Speech is not slurred.         Behavior: Behavior is not combative.         06/28/2022    Left    Ulcer location: left lateral midfoot  Signs of infection: local edema and erythema  Drainage: Sero-Sanguinous  Purulence: no  Crepitus/fluctuance: yes  Periwound: Macerated, Calloused  Base: Granular/Healthy  Depth: subcutaneous tissue  Probe to bone: no        Ulcer location: left medial 1st MTPJ  Signs of infection: local edema and erythema  Drainage: Sero-Sanguinous  Purulence: no  Crepitus/fluctuance: yes  Periwound: erythema  Base: fibrin  Depth: unstageable  Probe to bone: no        Ulcer location: bulla to distal left 4th digit  Signs of infection: local edema and erythema  Drainage: Sero-Sanguinous  Purulence: no  Crepitus/fluctuance: yes  Periwound: erythema  Base: granular  Depth: subcutaneous  Probe to bone: no          Right    Ulcer location: right medial 1st MTPJ  Signs of infection: local edema and erythema  Drainage:  Sero-Sanguinous  Purulence: no  Crepitus/fluctuance: yes  Periwound: erythema and callus  Base: fibrogranular  Depth: subcutaneous  Probe to bone: no            Assessment:           ICD-10-CM ICD-9-CM   1. Type II diabetes mellitus with neurological manifestations  E11.49 250.60   2. Foot ulceration, left, with fat layer exposed  L97.522 707.15   3. Foot ulcer, right, with fat layer exposed  L97.512 707.15   4. Plantar fat pad atrophy  L90.9 701.9                Plan:            Orders Placed This Encounter    Aerobic culture    Culture, Anaerobic    Ambulatory referral/consult to Wound Clinic         Greater than 50% of this visit spent on counseling and coordination of care.    Education about the prevention of limb loss.    Discussed wound healing cycle, skin integrity, ways to care for skin.Counseled patient on the effects of blood glucose on healing. He verbalizes understanding that it can increase the chances of delayed healing and this prolonged exposure leads to infection or progression of infection which subsequently can result in loss of limb.    Adequate vitamin supplementation, protein intake, and hydration - discussed with patient    The wound is cleansed of foreign material as much as possible and the base inspected for bone or abscess. . Aerobic and anerobic cultures swabs taken of the lateral left foot    Dressings: iodosorb  Offloading: footballs and darco shoes    Follow-up: wound clinic what they are going to be seen this morning but should call [facility] immediately if any signs of infection, such as fever, chills, sweats, increased redness or pain.    Short-term goals include maintaining good offloading and minimizing bioburden, promoting granulation and epithelialization to healing.  Long-term goals include keeping the wound healed by good offloading and medical management under the direction of internist.    Shoe inspection. Diabetic Foot Education. Patient reminded of the importance of good  nutrition and blood sugar control to help prevent podiatric complications of diabetes. Patient instructed on proper foot hygeine. We discussed wearing proper shoe gear, daily foot inspections, never walking without protective shoe gear, never putting sharp instruments to feet.                Orders Placed This Encounter   Procedures    Aerobic culture    Culture, Anaerobic    Ambulatory referral/consult to Wound Clinic     Standing Status:   Future     Standing Expiration Date:   7/28/2023     Referral Priority:   Routine     Referral Type:   Consultation     Referral Reason:   Specialty Services Required     Requested Specialty:   Wound Care     Number of Visits Requested:   1        No follow-ups on file.

## 2022-07-04 LAB — BACTERIA SPEC ANAEROBE CULT: NORMAL

## 2022-07-06 ENCOUNTER — HOSPITAL ENCOUNTER (OUTPATIENT)
Dept: WOUND CARE | Facility: HOSPITAL | Age: 57
Discharge: HOME | End: 2022-07-06
Attending: PODIATRIST
Payer: COMMERCIAL

## 2022-07-06 VITALS
RESPIRATION RATE: 20 BRPM | DIASTOLIC BLOOD PRESSURE: 102 MMHG | HEART RATE: 71 BPM | TEMPERATURE: 98 F | SYSTOLIC BLOOD PRESSURE: 200 MMHG

## 2022-07-06 DIAGNOSIS — L97.512 FOOT ULCER, RIGHT, WITH FAT LAYER EXPOSED: ICD-10-CM

## 2022-07-06 DIAGNOSIS — E11.49 TYPE II DIABETES MELLITUS WITH NEUROLOGICAL MANIFESTATIONS: ICD-10-CM

## 2022-07-06 DIAGNOSIS — L97.522 FOOT ULCERATION, LEFT, WITH FAT LAYER EXPOSED: ICD-10-CM

## 2022-07-06 PROCEDURE — 99214 OFFICE O/P EST MOD 30 MIN: CPT | Mod: 25,,, | Performed by: FAMILY MEDICINE

## 2022-07-06 PROCEDURE — 11042 DBRDMT SUBQ TIS 1ST 20SQCM/<: CPT

## 2022-07-06 PROCEDURE — 11042 DBRDMT SUBQ TIS 1ST 20SQCM/<: CPT | Mod: ,,, | Performed by: FAMILY MEDICINE

## 2022-07-06 PROCEDURE — 99214 PR OFFICE/OUTPT VISIT, EST, LEVL IV, 30-39 MIN: ICD-10-PCS | Mod: 25,,, | Performed by: FAMILY MEDICINE

## 2022-07-06 PROCEDURE — 11042 DEBRIDEMENT: ICD-10-PCS | Mod: ,,, | Performed by: FAMILY MEDICINE

## 2022-07-06 NOTE — PROGRESS NOTES
[facility]                Progress Note    Subjective:       Patient ID: Catalino Mcfadden is a 57 y.o. male.    Chief Complaint: Wound Check    Patient readmitted to [facility]. Patient ambulatory to exam room with bilateral darco on feet. Patient denies pain or discomfort. Patient states he purchased shoes recommended per podiatry but shoes were too fitted and he develop blisters to bilateral medial feet. Patient was seen in podiatry clinic on last week. Wound dressing to right foot intact with no drainage noted and wound with fibrin tissue to wound bed. Wound dressing to left foot intact with no drainage noted to medial foot but moderate breakthrough drainage noted to lateral plantar foot. Wound to left medial foot has fibrin tissue and wound to left lateral plantar foot has increased callus to periwound. Patient instructed on proper eating habits and applying too much pressure on feet, patient verbalized understanding. Wound care done per order. RTC on Friday 7/15/22 with MD Martinez.    This is an established patient in wound care.   Patient presents in the office today for evaluation of the chronic wound.  Updated HPI is noted below.    The periwound appears non-erythematous, no maceration noted, edematous    The wound appears periwound callus. Fat layer exposed. No odor. Serous drainage.     Patient denies fever, chills    Patients reports wound pain    Lymphedema status is noncontributory to wound status    Pain at the site of the wound is aching    Contributing factors to current wound state include numerous comorbid conditions, Diabetes Type 2, Hypertension, off-loading limitations    Review of Systems   Constitutional: Negative for activity change, appetite change and fever.   HENT: Negative for congestion.    Respiratory: Negative for shortness of breath and wheezing.    Cardiovascular: Negative for chest pain and leg swelling.   Gastrointestinal: Negative for abdominal  pain, nausea and vomiting.   Skin: Positive for wound.   Neurological: Negative for dizziness and headaches.   Psychiatric/Behavioral: The patient is not nervous/anxious.          Objective:        Physical Exam  Vitals and nursing note reviewed.   Constitutional:       Appearance: He is well-developed.   HENT:      Head: Normocephalic and atraumatic.   Eyes:      Conjunctiva/sclera: Conjunctivae normal.   Pulmonary:      Effort: Pulmonary effort is normal.   Abdominal:      General: There is no distension.      Palpations: Abdomen is soft.   Musculoskeletal:         General: Normal range of motion.      Cervical back: Normal range of motion and neck supple.   Skin:     Comments: See wound description   Neurological:      Mental Status: He is alert and oriented to person, place, and time.   Psychiatric:         Behavior: Behavior normal.           Vitals:    07/06/22 0949   BP: (!) 200/102   Pulse:    Resp:    Temp:        Assessment:           ICD-10-CM ICD-9-CM   1. Type II diabetes mellitus with neurological manifestations  E11.49 250.60   2. Foot ulceration, left, with fat layer exposed  L97.522 707.15   3. Foot ulcer, right, with fat layer exposed  L97.512 707.15            Wound 07/06/22 0920 Diabetic Ulcer Right medial Foot (Active)   07/06/22 0920    Pre-existing:    Primary Wound Type: Diabetic ulc   Side: Right   Orientation: medial   Location: Foot   Wound Number:    Ankle-Brachial Index:    Pulses:    Removal Indication and Assessment:    Wound Outcome:    (Retired) Wound Type:    (Retired) Wound Length (cm):    (Retired) Wound Width (cm):    (Retired) Depth (cm):    Wound Description (Comments):    Removal Indications:    Wound Image    07/06/22 0900   Wound WDL ex 07/06/22 0900   Dressing Appearance Dry;Intact 07/06/22 0900   Drainage Amount None 07/06/22 0900   Appearance Red;Fibrin 07/06/22 0900   Tissue loss description Partial thickness 07/06/22 0900   Black (%), Wound Tissue Color 0 % 07/06/22  0900   Red (%), Wound Tissue Color 100 % 07/06/22 0900   Yellow (%), Wound Tissue Color 0 % 07/06/22 0900   Periwound Area Dry;[place] 07/06/22 0900   Wound Edges Defined;Callused 07/06/22 0900   Wound Length (cm) 1.3 cm 07/06/22 0900   Wound Width (cm) 0.5 cm 07/06/22 0900   Wound Depth (cm) 0.2 cm 07/06/22 0900   Wound Volume (cm^3) 0.13 cm^3 07/06/22 0900   Wound Surface Area (cm^2) 0.65 cm^2 07/06/22 0900   Care Cleansed with:;Soap and water;Sterile normal saline 07/06/22 0900   Dressing Changed 07/06/22 0900   Compression Compression Stocking (20-30 mmHg) 07/06/22 0900   Off Loading Football dressing;Off loading shoe 07/06/22 0900   Dressing Change Due 07/15/22 07/06/22 0900            Wound 07/06/22 0921 Diabetic Ulcer Left medial Foot (Active)   07/06/22 0921    Pre-existing:    Primary Wound Type: Diabetic ulc   Side: Left   Orientation: medial   Location: Foot   Wound Number:    Ankle-Brachial Index:    Pulses:    Removal Indication and Assessment:    Wound Outcome:    (Retired) Wound Type:    (Retired) Wound Length (cm):    (Retired) Wound Width (cm):    (Retired) Depth (cm):    Wound Description (Comments):    Removal Indications:    Wound Image    07/06/22 0900   Wound WDL ex 07/06/22 0900   Dressing Appearance Dry;Intact 07/06/22 0900   Drainage Amount None 07/06/22 0900   Appearance Fibrin;Maroon 07/06/22 0900   Tissue loss description Partial thickness 07/06/22 0900   Black (%), Wound Tissue Color 0 % 07/06/22 0900   Red (%), Wound Tissue Color 100 % 07/06/22 0900   Yellow (%), Wound Tissue Color 0 % 07/06/22 0900   Periwound Area Dry;Intact 07/06/22 0900   Wound Edges Callused;Defined 07/06/22 0900   Wound Length (cm) 1.3 cm 07/06/22 0900   Wound Width (cm) 1.2 cm 07/06/22 0900   Wound Depth (cm) 0.1 cm 07/06/22 0900   Wound Volume (cm^3) 0.156 cm^3 07/06/22 0900   Wound Surface Area (cm^2) 1.56 cm^2 07/06/22 0900   Care Cleansed with:;Soap and water;Sterile normal saline 07/06/22 0900   Dressing  Changed 07/06/22 0900   Compression Tubular elasticized bandage 07/06/22 0900   Off Loading Football dressing;Off loading shoe 07/06/22 0900   Dressing Change Due 07/14/22 07/06/22 0900            Wound 07/06/22 0921 Diabetic Ulcer Left lateral;plantar Foot (Active)   07/06/22 0921    Pre-existing:    Primary Wound Type: Diabetic ulc   Side: Left   Orientation: lateral;plantar   Location: Foot   Wound Number:    Ankle-Brachial Index:    Pulses:    Removal Indication and Assessment:    Wound Outcome:    (Retired) Wound Type:    (Retired) Wound Length (cm):    (Retired) Wound Width (cm):    (Retired) Depth (cm):    Wound Description (Comments):    Removal Indications:    Wound Image    07/06/22 0900   Wound WDL ex 07/06/22 0900   Dressing Appearance Intact;Dried drainage 07/06/22 0900   Drainage Amount Moderate 07/06/22 0900   Drainage Characteristics/Odor Serosanguineous 07/06/22 0900   Appearance Red 07/06/22 0900   Tissue loss description Partial thickness 07/06/22 0900   Black (%), Wound Tissue Color 0 % 07/06/22 0900   Red (%), Wound Tissue Color 100 % 07/06/22 0900   Yellow (%), Wound Tissue Color 0 % 07/06/22 0900   Periwound Area Dry 07/06/22 0900   Wound Edges Callused 07/06/22 0900   Wound Length (cm) 2.5 cm 07/06/22 0900   Wound Width (cm) 2.9 cm 07/06/22 0900   Wound Depth (cm) 0.3 cm 07/06/22 0900   Wound Volume (cm^3) 2.175 cm^3 07/06/22 0900   Wound Surface Area (cm^2) 7.25 cm^2 07/06/22 0900   Care Cleansed with:;Soap and water;Sterile normal saline 07/06/22 0900   Dressing Changed 07/06/22 0900   Compression Tubular elasticized bandage 07/06/22 0900   Off Loading Football dressing;Off loading shoe 07/06/22 0900   Dressing Change Due 07/14/22 07/06/22 0900           Debridement    Date/Time: 7/6/2022 8:36 AM  Performed by: Michaela Díaz MD  Authorized by: Michaela Díaz MD     Consent Done?:  Yes (Verbal)  Local anesthesia used?: No      Wound Details:    Location:  Right foot    Location:  Right 1st  Metatarsal Head    Type of Debridement:  Excisional       Length (cm):  1.3       Area (sq cm):  0.65       Width (cm):  0.5       Percent Debrided (%):  100       Depth (cm):  0.2       Total Area Debrided (sq cm):  0.65    Depth of debridement:  Subcutaneous tissue    Tissue debrided:  Subcutaneous    Devitalized tissue debrided:  Slough and Necrotic/Eschar    Instruments:  Curette    Additional wounds:  2    2nd Wound Details:     Location:  Left foot    Location:  Left 1st Metatarsal Head    Location:  Left 1st Metatarsal Head    Type of Debridement:  Excisional       Length (cm):  1.3       Area (sq cm):  1.56       Width (cm):  1.2       Percent Debrided (%):  100       Depth (cm):  0.2       Total Area Debrided (sq cm):  1.56    Depth of debridement:  Subcutaneous tissue    Tissue debrided:  Subcutaneous    Devitalized tissue debrided:  Slough, Fibrin and Callus    Instruments:  Curette    3rd Wound Details:     Location:  Left foot    Location:  Left Midfoot    Location:  Left Midfoot    Type of Debridement:  Excisional       Length (cm):  2.5       Area (sq cm):  7.25       Width (cm):  2.9       Percent Debrided (%):  100       Depth (cm):  0.3       Total Area Debrided (sq cm):  7.25    Depth of debridement:  Subcutaneous tissue    Tissue debrided:  Subcutaneous    Devitalized tissue debrided:  Slough, Fibrin and Callus    Instruments:  Curette    Bleeding:  Minimal  Hemostasis Achieved: Yes    Patient tolerance:  Patient tolerated the procedure well with no immediate complications      Plan:            1. Debridement needed and Done today.   2. Keep dressing clean and intact  3. Recommend off-loading  4. Recommend following with pcp regarding blood pressure and diabetes - Patient states he has appointment tomorrow  5. Increase protein   6. Improve diet / decrease salt   7. Continue with wound care orders and plan as noted in orders.   8. Continue to follow current medication regimen as per pcp   9. Call  for any questions / concerns.          Orders Placed This Encounter   Procedures    Debridement     This order was created via procedure documentation     Standing Status:   Standing     Number of Occurrences:   1    Ambulatory referral/consult to Wound Clinic     Standing Status:   Standing     Number of Occurrences:   1     Referral Priority:   Routine     Referral Type:   Consultation     Referral Reason:   Specialty Services Required     Requested Specialty:   Wound Care     Number of Visits Requested:   1    Change dressing     Wound Dressing Orders    Dressing change frequency once a week  Remove old dressing  Cleanse or irrigate with: Normal Saline  Primary dressing - adjust to fit: WOUND BASE: Cadexomer Iodine Pad  Secondary dressing: Mextra short x1 to left lateral plantar foot and Non-Bordered Foam (marycarmen foam short x2 to rt/lf medial foot, long x1 to left lateral plantar foot)  Off-load: Football (cast padding  x3) and Darco shoe on the affected foot  Compression: Tubi- size E (calf 35cm)        Follow up in about 9 days (around 7/15/2022) for wound care visit.

## 2022-07-08 ENCOUNTER — TELEPHONE (OUTPATIENT)
Dept: FAMILY MEDICINE | Facility: CLINIC | Age: 57
End: 2022-07-08
Payer: COMMERCIAL

## 2022-07-08 NOTE — TELEPHONE ENCOUNTER
----- Message from Jason Mckeon MA sent at 7/8/2022  1:01 PM CDT -----  Type: Patient Call Back    Who called: self    What is the request in detail: pt. States he has been waiting for his medication to be filled and hasn't heard anything from the office ..     Can the clinic reply by [facility]?no    Would the patient rather a call back or a response via [facility]? Yes    Best call back number: [phone]

## 2022-07-08 NOTE — TELEPHONE ENCOUNTER
Informed pt that sevelamer was denied by insurance and he needs to reach out to his nephrologist to see if they can get it approved or send in a different medication; pt also stated he needs refill on nifedipine, informed him this was sent in on 6/21/22

## 2022-07-15 ENCOUNTER — PATIENT MESSAGE (OUTPATIENT)
Dept: INTERNAL MEDICINE | Facility: CLINIC | Age: 57
End: 2022-07-15
Payer: COMMERCIAL

## 2022-07-15 ENCOUNTER — HOSPITAL ENCOUNTER (OUTPATIENT)
Dept: WOUND CARE | Facility: HOSPITAL | Age: 57
Discharge: HOME | End: 2022-07-15
Attending: PODIATRIST
Payer: COMMERCIAL

## 2022-07-15 VITALS
HEART RATE: 79 BPM | SYSTOLIC BLOOD PRESSURE: 150 MMHG | TEMPERATURE: 98 F | RESPIRATION RATE: 20 BRPM | DIASTOLIC BLOOD PRESSURE: 91 MMHG

## 2022-07-15 DIAGNOSIS — L97.422 DIABETIC ULCER OF LEFT MIDFOOT ASSOCIATED WITH TYPE 2 DIABETES MELLITUS, WITH FAT LAYER EXPOSED: ICD-10-CM

## 2022-07-15 DIAGNOSIS — L97.512 FOOT ULCER, RIGHT, WITH FAT LAYER EXPOSED: ICD-10-CM

## 2022-07-15 DIAGNOSIS — E11.49 TYPE II DIABETES MELLITUS WITH NEUROLOGICAL MANIFESTATIONS: ICD-10-CM

## 2022-07-15 DIAGNOSIS — L97.522 FOOT ULCERATION, LEFT, WITH FAT LAYER EXPOSED: Primary | ICD-10-CM

## 2022-07-15 DIAGNOSIS — E11.621 DIABETIC ULCER OF LEFT MIDFOOT ASSOCIATED WITH TYPE 2 DIABETES MELLITUS, WITH FAT LAYER EXPOSED: ICD-10-CM

## 2022-07-15 PROCEDURE — 99499 NO LOS: ICD-10-PCS | Mod: ,,, | Performed by: PODIATRIST

## 2022-07-15 PROCEDURE — 11042 DBRDMT SUBQ TIS 1ST 20SQCM/<: CPT | Performed by: PODIATRIST

## 2022-07-15 PROCEDURE — 11042 DBRDMT SUBQ TIS 1ST 20SQCM/<: CPT | Mod: ,,, | Performed by: PODIATRIST

## 2022-07-15 PROCEDURE — 11042 WOUND DEBRIDEMENT: ICD-10-PCS | Mod: ,,, | Performed by: PODIATRIST

## 2022-07-15 PROCEDURE — 99499 UNLISTED E&M SERVICE: CPT | Mod: ,,, | Performed by: PODIATRIST

## 2022-07-15 NOTE — PROGRESS NOTES
[facility]                Progress Note    Subjective:       Patient ID: Caatlino Mcfadden is a 57 y.o. male.    Chief Complaint: Wound Check    F/u wound care visit. Patient ambulatory to exam room with bilateral darco shoes on as ordered, no difficulty noted. Patient denies pain or discomfort at present. Wound dressings intact. He continues to work regularly      Review of Systems   Constitutional: Negative for activity change, appetite change and fever.   HENT: Negative for congestion.    Respiratory: Negative for shortness of breath and wheezing.    Cardiovascular: Negative for chest pain and leg swelling.   Gastrointestinal: Negative for abdominal pain, nausea and vomiting.   Skin: Positive for wound.   Neurological: Negative for dizziness and headaches.   Psychiatric/Behavioral: The patient is not nervous/anxious.            Objective:        Physical Exam  Vitals and nursing note reviewed.   Constitutional:       General: He is not in acute distress.     Appearance: He is not toxic-appearing or diaphoretic.      Comments: Pt. is well-developed, well-nourished, appears stated age, in no acute distress, alert and oriented x 3. No evidence of depression, anxiety, or agitation. Calm, cooperative, and communicative. Appropriate interactions and affect.   Cardiovascular:      Pulses:           Dorsalis pedis pulses are 2+ on the right side and 2+ on the left side.        Posterior tibial pulses are 1+ on the right side and 1+ on the left side.      Comments: There is decreased digital hair. Skin is atrophic, slightly hyperpigmented  Pulmonary:      Effort: No respiratory distress.   Musculoskeletal:      Right ankle: No swelling. No tenderness. No lateral malleolus, medial malleolus, AITF ligament, CF ligament or posterior TF ligament tenderness. Normal range of motion.      Right Achilles Tendon: No defects. Hilliard's test negative.      Left ankle: No swelling. No  tenderness. No lateral malleolus, medial malleolus, AITF ligament, CF ligament or posterior TF ligament tenderness. Normal range of motion.      Left Achilles Tendon: No defects. Hilliard's test negative.      Right foot: No tenderness or bony tenderness.      Left foot: No tenderness or bony tenderness.      Comments: Decreased stride, station of gait.  apropulsive toe off.  Increased angle and base of gait.    There is equinus deformity bilateral with decreased dorsiflexion at the ankle joint bilateral. No tenderness with compression of heel. Negative tinels sign. Gait analysis reveals excessive pronation through midstance and propulsion with early heel off.     Patient has hammertoes of digits 2-5 bilateral partially reducible     Decreased first MPJ range of motion both weightbearing and nonweightbearing, no crepitus observed the first MP joint, + dorsal flag sign.     Visible and palpable bunion without pain at dorsomedial 1st metatarsal head right and left.  Hallux abducted right and left partially reducible, tracks laterally without being track bound.  No ecchymosis, erythema, edema, or cardinal signs infection or signs of trauma same foot.    Fat pad atrophy to heels and met heads bilateral    Plantarflexed 1st ray RLE   Lymphadenopathy:      Comments: No lymphatic streaking    Negative lymphadenopathy bilateral popliteal fossa and tarsal tunnel.     Skin:     General: Skin is warm and dry.      Coloration: Skin is not pale.      Findings: Erythema and lesion (see wound description below) present. No abrasion, ecchymosis, laceration or rash.      Nails: There is no clubbing.      Comments: Toenails 1-5 bilaterally discolored/yellowed, dystrophic, brittle with subungual debris.    Neurological:      Sensory: Sensory deficit present.      Comments:  Gentry-Dayton 5.07 monofilamant testing is diminished Kp feet. Decreased/absent vibratory sensation bilateral feet to 128Hz tuning fork.     Paresthesias, and  hyperesthesia bilateral feet with no clearly identified trigger or source.           Psychiatric:         Attention and Perception: He is attentive.         Mood and Affect: Mood is not anxious. Affect is not inappropriate.         Speech: He is communicative. Speech is not slurred.         Behavior: Behavior is not combative.         Vitals:    07/15/22 0729   BP: (!) 150/91   Pulse: 79   Resp: 20   Temp: 97.7 °F (36.5 °C)       Assessment:           ICD-10-CM ICD-9-CM   1. Foot ulceration, left, with fat layer exposed  L97.522 707.15   2. Foot ulcer, right, with fat layer exposed  L97.512 707.15   3. Diabetic ulcer of left midfoot associated with type 2 diabetes mellitus, with fat layer exposed  E11.621 250.80    L97.422 707.14   4. Type II diabetes mellitus with neurological manifestations  E11.49 250.60            Wound 07/06/22 0920 Diabetic Ulcer Right medial Foot (Active)   07/06/22 0920    Pre-existing:    Primary Wound Type: Diabetic ulc   Side: Right   Orientation: medial   Location: Foot   Wound Number:    Ankle-Brachial Index:    Pulses:    Removal Indication and Assessment:    Wound Outcome:    (Retired) Wound Type:    (Retired) Wound Length (cm):    (Retired) Wound Width (cm):    (Retired) Depth (cm):    Wound Description (Comments):    Removal Indications:    Wound Image   07/15/22 0700   Wound WDL ex 07/15/22 0700   Dressing Appearance Intact;Dried drainage 07/15/22 0700   Drainage Amount Small 07/15/22 0700   Drainage Characteristics/Odor Serous 07/15/22 0700   Appearance Red;Yellow 07/15/22 0700   Tissue loss description Partial thickness 07/15/22 0700   Black (%), Wound Tissue Color 0 % 07/15/22 0700   Red (%), Wound Tissue Color 50 % 07/15/22 0700   Yellow (%), Wound Tissue Color 50 % 07/15/22 0700   Periwound Area Dry;Intact 07/15/22 0700   Wound Edges Defined;Callused 07/15/22 0700   Wound Length (cm) 0.9 cm 07/15/22 0700   Wound Width (cm) 0.4 cm 07/15/22 0700   Wound Depth (cm) 0.2 cm  07/15/22 0700   Wound Volume (cm^3) 0.072 cm^3 07/15/22 0700   Wound Surface Area (cm^2) 0.36 cm^2 07/15/22 0700   Care Cleansed with:;Soap and water;Sterile normal saline 07/15/22 0700   Dressing Changed 07/15/22 0700   Compression Tubular elasticized bandage 07/15/22 0700   Off Loading Football dressing;Off loading shoe 07/15/22 0700   Dressing Change Due 07/22/22 07/15/22 0700            Wound 07/06/22 0921 Diabetic Ulcer Left medial Foot (Active)   07/06/22 0921    Pre-existing:    Primary Wound Type: Diabetic ulc   Side: Left   Orientation: medial   Location: Foot   Wound Number:    Ankle-Brachial Index:    Pulses:    Removal Indication and Assessment:    Wound Outcome:    (Retired) Wound Type:    (Retired) Wound Length (cm):    (Retired) Wound Width (cm):    (Retired) Depth (cm):    Wound Description (Comments):    Removal Indications:    Wound Image   07/15/22 0700   Wound WDL ex 07/15/22 0700   Dressing Appearance Intact;Dried drainage 07/15/22 0700   Drainage Amount Small 07/15/22 0700   Drainage Characteristics/Odor Serous 07/15/22 0700   Appearance Red;Pink 07/15/22 0700   Tissue loss description Partial thickness 07/15/22 0700   Black (%), Wound Tissue Color 0 % 07/15/22 0700   Red (%), Wound Tissue Color 100 % 07/15/22 0700   Yellow (%), Wound Tissue Color 0 % 07/15/22 0700   Periwound Area Dry;Intact 07/15/22 0700   Wound Edges Callused 07/15/22 0700   Wound Length (cm) 0.9 cm 07/15/22 0700   Wound Width (cm) 1.2 cm 07/15/22 0700   Wound Depth (cm) 0.3 cm 07/15/22 0700   Wound Volume (cm^3) 0.324 cm^3 07/15/22 0700   Wound Surface Area (cm^2) 1.08 cm^2 07/15/22 0700   Undermining (depth (cm)/location) 0.3cm @ 6-7 o'clock 07/15/22 0700   Care Cleansed with:;Soap and water;Sterile normal saline 07/15/22 0700   Dressing Changed 07/15/22 0700   Compression Tubular elasticized bandage 07/15/22 0700   Off Loading Football dressing;Off loading shoe 07/15/22 0700   Dressing Change Due 07/22/22 07/15/22 0700             Wound 07/06/22 0921 Diabetic Ulcer Left lateral;plantar Foot (Active)   07/06/22 0921    Pre-existing:    Primary Wound Type: Diabetic ulc   Side: Left   Orientation: lateral;plantar   Location: Foot   Wound Number:    Ankle-Brachial Index:    Pulses:    Removal Indication and Assessment:    Wound Outcome:    (Retired) Wound Type:    (Retired) Wound Length (cm):    (Retired) Wound Width (cm):    (Retired) Depth (cm):    Wound Description (Comments):    Removal Indications:    Wound Image    07/15/22 0700   Wound WDL ex 07/15/22 0700   Dressing Appearance Intact;Dry 07/15/22 0700   Drainage Amount None 07/15/22 0700   Appearance Red 07/15/22 0700   Tissue loss description Partial thickness 07/15/22 0700   Black (%), Wound Tissue Color 0 % 07/15/22 0700   Red (%), Wound Tissue Color 100 % 07/15/22 0700   Yellow (%), Wound Tissue Color 0 % 07/15/22 0700   Periwound Area Dry 07/15/22 0700   Wound Edges Callused 07/15/22 0700   Wound Length (cm) 1 cm 07/15/22 0700   Wound Width (cm) 1 cm 07/15/22 0700   Wound Depth (cm) 0.2 cm 07/15/22 0700   Wound Volume (cm^3) 0.2 cm^3 07/15/22 0700   Wound Surface Area (cm^2) 1 cm^2 07/15/22 0700   Care Cleansed with:;Soap and water;Sterile normal saline 07/15/22 0700   Dressing Changed 07/15/22 0700   Compression Tubular elasticized bandage 07/15/22 0700   Off Loading Football dressing;Off loading shoe 07/15/22 0700   Dressing Change Due 07/22/22 07/15/22 0700           Plan:            Right foot intact with small amount of drainage noted, wound to medial foot measuring 0.4cm smaller length and 0.1cm smaller width. Wound dressing to left foot intact with small amount of drainage noted from medial foot wound which measures 0.4cm smaller length and 0.2cm larger depth. Wound to left lateral plantar foot measuring 1.5cm smaller length, 1.9cm smaller width and 0.1cm smaller depth, no drainage noted. Patient encouraged to increase protein intake to promote wound healing and  coupons for hieu and glucerna given. Patient also given samples of Ensure max protein.     RTC in one week.    Wound Debridement    Date/Time: 7/15/2022 6:41 AM  Performed by: Aviva Martinez DPM  Authorized by: Aviva Martinez DPM     Consent Done?:  Yes (Written)    Wound Details:    Location:  Right foot    Location:  Right 1st Metatarsal Head    Type of Debridement:  Excisional       Length (cm):  0.9       Area (sq cm):  0.36       Width (cm):  0.4       Percent Debrided (%):  100       Depth (cm):  0.2       Total Area Debrided (sq cm):  0.36    Depth of debridement:  Subcutaneous tissue    Tissue debrided:  Dermis, Epidermis and Subcutaneous    Devitalized tissue debrided:  Callus and Fibrin    Instruments:  Curette    Additional wounds:  2    2nd Wound Details:     Location:  Left foot    Location:  Left 1st Metatarsal Head    Location:  Left 1st Metatarsal Head    Type of Debridement:  Excisional       Length (cm):  0.9       Area (sq cm):  1.08       Width (cm):  1.2       Percent Debrided (%):  100       Depth (cm):  0.3       Total Area Debrided (sq cm):  1.08    Depth of debridement:  Subcutaneous tissue    Tissue debrided:  Dermis, Epidermis and Subcutaneous    Devitalized tissue debrided:  Callus and Fibrin    Instruments:  Curette, Forceps and Scissors    3rd Wound Details:     Location:  Left foot    Location:  Left Midfoot    Location:  Left Midfoot    Type of Debridement:  Excisional       Length (cm):  1       Area (sq cm):  1       Width (cm):  1       Percent Debrided (%):  100       Depth (cm):  0.2       Total Area Debrided (sq cm):  1    Depth of debridement:  Subcutaneous tissue    Tissue debrided:  Dermis, Epidermis and Subcutaneous    Devitalized tissue debrided:  Callus and Fibrin    Instruments:  Curette    Bleeding:  Minimal  Hemostasis Achieved: Yes    Method Used:  Pressure  Patient tolerance:  Patient tolerated the procedure well with no immediate complications        Orders Placed  This Encounter   Procedures    Debridement     This order was created via procedure documentation     Standing Status:   Standing     Number of Occurrences:   1    Change dressing     Wound Dressing Orders    Dressing change frequency once a week  Remove old dressing  Cleanse or irrigate with: Normal Saline  Primary dressing - adjust to fit: WOUND BASE: Promogran Francesca  Secondary dressing: Non-Bordered Foam (marycarmen foam short x2 to medial feet wounds, marycarmen foam long x1 to left lateral plantar wound)  Off-load: Football (cast padding x3) and Darco shoe on the affected foot  Compression: Tubi- size E (right calf-37cm, left calf 36cm)        Follow up in about 1 week (around 7/22/2022) for wound care visit.

## 2022-07-19 ENCOUNTER — PATIENT OUTREACH (OUTPATIENT)
Dept: ADMINISTRATIVE | Facility: HOSPITAL | Age: 57
End: 2022-07-19
Payer: COMMERCIAL

## 2022-07-22 ENCOUNTER — HOSPITAL ENCOUNTER (OUTPATIENT)
Dept: WOUND CARE | Facility: HOSPITAL | Age: 57
Discharge: HOME | End: 2022-07-22
Attending: PODIATRIST
Payer: COMMERCIAL

## 2022-07-22 VITALS
DIASTOLIC BLOOD PRESSURE: 92 MMHG | SYSTOLIC BLOOD PRESSURE: 186 MMHG | HEART RATE: 86 BPM | TEMPERATURE: 98 F | RESPIRATION RATE: 20 BRPM

## 2022-07-22 DIAGNOSIS — L97.422 DIABETIC ULCER OF LEFT MIDFOOT ASSOCIATED WITH TYPE 2 DIABETES MELLITUS, WITH FAT LAYER EXPOSED: ICD-10-CM

## 2022-07-22 DIAGNOSIS — L97.512 FOOT ULCER, RIGHT, WITH FAT LAYER EXPOSED: Primary | ICD-10-CM

## 2022-07-22 DIAGNOSIS — L97.522 FOOT ULCERATION, LEFT, WITH FAT LAYER EXPOSED: ICD-10-CM

## 2022-07-22 DIAGNOSIS — E11.621 DIABETIC ULCER OF LEFT MIDFOOT ASSOCIATED WITH TYPE 2 DIABETES MELLITUS, WITH FAT LAYER EXPOSED: ICD-10-CM

## 2022-07-22 PROCEDURE — 11042 DBRDMT SUBQ TIS 1ST 20SQCM/<: CPT | Performed by: PODIATRIST

## 2022-07-22 PROCEDURE — 11042 WOUND DEBRIDEMENT: ICD-10-PCS | Mod: ,,, | Performed by: PODIATRIST

## 2022-07-22 PROCEDURE — 99499 NO LOS: ICD-10-PCS | Mod: ,,, | Performed by: PODIATRIST

## 2022-07-22 PROCEDURE — 99499 UNLISTED E&M SERVICE: CPT | Mod: ,,, | Performed by: PODIATRIST

## 2022-07-22 PROCEDURE — 11042 DBRDMT SUBQ TIS 1ST 20SQCM/<: CPT | Mod: ,,, | Performed by: PODIATRIST

## 2022-07-22 NOTE — PROGRESS NOTES
[facility]                Progress Note    Subjective:       Patient ID: Catalino Mcfadden is a 57 y.o. male.    Chief Complaint: Wound Check    F/u wound care visit. Patient ambulatory to exam room with bilateral darco on as ordered, no difficulty noted. Patient denies pain or discomfort at present. Wound dressing to bilateral feet intact with scant to small drainage noted.       Review of Systems   Constitutional: Negative for activity change, appetite change and fever.   HENT: Negative for congestion.    Respiratory: Negative for shortness of breath and wheezing.    Cardiovascular: Negative for chest pain and leg swelling.   Gastrointestinal: Negative for abdominal pain, nausea and vomiting.   Skin: Positive for wound.   Neurological: Negative for dizziness and headaches.   Psychiatric/Behavioral: The patient is not nervous/anxious.          Objective:        Physical Exam  Vitals and nursing note reviewed.   Constitutional:       General: He is not in acute distress.     Appearance: He is not toxic-appearing or diaphoretic.      Comments: Pt. is well-developed, well-nourished, appears stated age, in no acute distress, alert and oriented x 3. No evidence of depression, anxiety, or agitation. Calm, cooperative, and communicative. Appropriate interactions and affect.   Cardiovascular:      Pulses:           Dorsalis pedis pulses are 2+ on the right side and 2+ on the left side.        Posterior tibial pulses are 1+ on the right side and 1+ on the left side.      Comments: There is decreased digital hair. Skin is atrophic, slightly hyperpigmented  Pulmonary:      Effort: No respiratory distress.   Musculoskeletal:      Right ankle: No swelling. No tenderness. No lateral malleolus, medial malleolus, AITF ligament, CF ligament or posterior TF ligament tenderness. Normal range of motion.      Right Achilles Tendon: No defects. Hilliard's test negative.      Left ankle: No  swelling. No tenderness. No lateral malleolus, medial malleolus, AITF ligament, CF ligament or posterior TF ligament tenderness. Normal range of motion.      Left Achilles Tendon: No defects. Hilliard's test negative.      Right foot: No tenderness or bony tenderness.      Left foot: No tenderness or bony tenderness.      Comments: Decreased stride, station of gait.  apropulsive toe off.  Increased angle and base of gait.    There is equinus deformity bilateral with decreased dorsiflexion at the ankle joint bilateral. No tenderness with compression of heel. Negative tinels sign. Gait analysis reveals excessive pronation through midstance and propulsion with early heel off.     Patient has hammertoes of digits 2-5 bilateral partially reducible     Decreased first MPJ range of motion both weightbearing and nonweightbearing, no crepitus observed the first MP joint, + dorsal flag sign.     Visible and palpable bunion without pain at dorsomedial 1st metatarsal head right and left.  Hallux abducted right and left partially reducible, tracks laterally without being track bound.  No ecchymosis, erythema, edema, or cardinal signs infection or signs of trauma same foot.    Fat pad atrophy to heels and met heads bilateral    Plantarflexed 1st ray RLE   Lymphadenopathy:      Comments: No lymphatic streaking    Negative lymphadenopathy bilateral popliteal fossa and tarsal tunnel.     Skin:     General: Skin is warm and dry.      Coloration: Skin is not pale.      Findings: Erythema and lesion (see wound description below) present. No abrasion, ecchymosis, laceration or rash.      Nails: There is no clubbing.      Comments: Toenails 1-5 bilaterally discolored/yellowed, dystrophic, brittle with subungual debris.    Neurological:      Sensory: Sensory deficit present.      Comments:  [place] 5.07 monofilamant testing is diminished Kp feet. Decreased/absent vibratory sensation bilateral feet to 128Hz tuning fork.      Paresthesias, and hyperesthesia bilateral feet with no clearly identified trigger or source.           Psychiatric:         Attention and Perception: He is attentive.         Mood and Affect: Mood is not anxious. Affect is not inappropriate.         Speech: He is communicative. Speech is not slurred.         Behavior: Behavior is not combative.           Vitals:    07/22/22 0704   BP: (!) 186/92   Pulse: 86   Resp: 20   Temp: 97.6 °F (36.4 °C)       Assessment:           ICD-10-CM ICD-9-CM   1. Foot ulcer, right, with fat layer exposed  L97.512 707.15   2. Foot ulceration, left, with fat layer exposed  L97.522 707.15   3. Diabetic ulcer of left midfoot associated with type 2 diabetes mellitus, with fat layer exposed  E11.621 250.80    L97.422 707.14            Wound 07/06/22 0920 Diabetic Ulcer Right medial Foot (Active)   07/06/22 0920    Pre-existing:    Primary Wound Type: Diabetic ulc   Side: Right   Orientation: medial   Location: Foot   Wound Number:    Ankle-Brachial Index:    Pulses:    Removal Indication and Assessment:    Wound Outcome:    (Retired) Wound Type:    (Retired) Wound Length (cm):    (Retired) Wound Width (cm):    (Retired) Depth (cm):    Wound Description (Comments):    Removal Indications:    Wound Image   07/22/22 0700   Wound WDL ex 07/22/22 0700   Dressing Appearance Intact;Dried drainage 07/22/22 0700   Drainage Amount Scant 07/22/22 0700   Drainage Characteristics/Odor Serosanguineous 07/22/22 0700   Appearance Red 07/22/22 0700   Tissue loss description Partial thickness 07/22/22 0700   Black (%), Wound Tissue Color 0 % 07/22/22 0700   Red (%), Wound Tissue Color 100 % 07/22/22 0700   Yellow (%), Wound Tissue Color 0 % 07/22/22 0700   Periwound Area Dry;Intact 07/22/22 0700   Wound Edges Callused 07/22/22 0700   Wound Length (cm) 0.8 cm 07/22/22 0700   Wound Width (cm) 0.4 cm 07/22/22 0700   Wound Depth (cm) 0.2 cm 07/22/22 0700   Wound Volume (cm^3) 0.064 cm^3 07/22/22 0700   Wound  Surface Area (cm^2) 0.32 cm^2 07/22/22 0700   Care Cleansed with:;Soap and water;Sterile normal saline 07/22/22 0700   Dressing Changed 07/22/22 0700   Compression Tubular elasticized bandage 07/22/22 0700   Off Loading Football dressing;Off loading shoe 07/22/22 0700   Dressing Change Due 07/29/22 07/22/22 0700            Wound 07/06/22 0921 Diabetic Ulcer Left medial Foot (Active)   07/06/22 0921    Pre-existing:    Primary Wound Type: Diabetic ulc   Side: Left   Orientation: medial   Location: Foot   Wound Number:    Ankle-Brachial Index:    Pulses:    Removal Indication and Assessment:    Wound Outcome:    (Retired) Wound Type:    (Retired) Wound Length (cm):    (Retired) Wound Width (cm):    (Retired) Depth (cm):    Wound Description (Comments):    Removal Indications:    Wound Image   07/22/22 0700   Wound WDL ex 07/22/22 0700   Dressing Appearance Intact;Dried drainage 07/22/22 0700   Drainage Amount Scant 07/22/22 0700   Drainage Characteristics/Odor Serosanguineous 07/22/22 0700   Appearance Red 07/22/22 0700   Tissue loss description Partial thickness 07/22/22 0700   Black (%), Wound Tissue Color 0 % 07/22/22 0700   Red (%), Wound Tissue Color 100 % 07/22/22 0700   Yellow (%), Wound Tissue Color 0 % 07/22/22 0700   Periwound Area Dry;Intact 07/22/22 0700   Wound Edges Callused 07/22/22 0700   Wound Length (cm) 0.9 cm 07/22/22 0700   Wound Width (cm) 1.2 cm 07/22/22 0700   Wound Depth (cm) 0.2 cm 07/22/22 0700   Wound Volume (cm^3) 0.216 cm^3 07/22/22 0700   Wound Surface Area (cm^2) 1.08 cm^2 07/22/22 0700   Care Cleansed with:;Soap and water;Sterile normal saline 07/22/22 0700   Dressing Changed 07/22/22 0700   Compression Tubular elasticized bandage 07/22/22 0700   Off Loading Football dressing;Off loading shoe 07/22/22 0700   Dressing Change Due 07/29/22 07/22/22 0700            Wound 07/06/22 0921 Diabetic Ulcer Left lateral;plantar Foot (Active)   07/06/22 0921    Pre-existing:    Primary Wound Type:  Diabetic ulc   Side: Left   Orientation: lateral;plantar   Location: Foot   Wound Number:    Ankle-Brachial Index:    Pulses:    Removal Indication and Assessment:    Wound Outcome:    (Retired) Wound Type:    (Retired) Wound Length (cm):    (Retired) Wound Width (cm):    (Retired) Depth (cm):    Wound Description (Comments):    Removal Indications:    Wound Image    07/22/22 0700   Wound WDL ex 07/22/22 0700   Dressing Appearance Intact;Dry 07/22/22 0700   Drainage Amount None 07/22/22 0700   Appearance Red 07/22/22 0700   Tissue loss description Partial thickness 07/22/22 0700   Black (%), Wound Tissue Color 0 % 07/22/22 0700   Red (%), Wound Tissue Color 100 % 07/22/22 0700   Yellow (%), Wound Tissue Color 0 % 07/22/22 0700   Periwound Area Dry 07/22/22 0700   Wound Edges Callused 07/22/22 0700   Wound Length (cm) 0.7 cm 07/22/22 0700   Wound Width (cm) 0.5 cm 07/22/22 0700   Wound Depth (cm) 0.4 cm 07/22/22 0700   Wound Volume (cm^3) 0.14 cm^3 07/22/22 0700   Wound Surface Area (cm^2) 0.35 cm^2 07/22/22 0700   Care Cleansed with:;Sterile normal saline;Soap and water 07/22/22 0700   Dressing Changed 07/22/22 0700   Compression Tubular elasticized bandage 07/22/22 0700   Off Loading Football dressing;Off loading shoe 07/22/22 0700   Dressing Change Due 07/29/22 07/22/22 0700           Plan:              Patient instructed patient on importance of limiting weight bearing to help with wound healing, patient verbalized understanding.     Wound Debridement    Date/Time: 7/22/2022 6:48 AM  Performed by: Aviva Martinez DPM  Authorized by: Aviva Martinez DPM       Wound Details:    Location:  Right foot    Location:  Right 1st Metatarsal Head    Type of Debridement:  Excisional       Length (cm):  0.8       Area (sq cm):  0.32       Width (cm):  0.4       Percent Debrided (%):  100       Depth (cm):  0.2       Total Area Debrided (sq cm):  0.32    Depth of debridement:  Subcutaneous tissue    Tissue debrided:   Subcutaneous, Epidermis and Dermis    Devitalized tissue debrided:  Callus    Instruments:  Curette    Additional wounds:  2    2nd Wound Details:     Location:  Left foot    Location:  Left 1st Metatarsal Head    Location:  Left 1st Metatarsal Head    Type of Debridement:  Excisional       Length (cm):  0.9       Area (sq cm):  1.08       Width (cm):  1.2       Percent Debrided (%):  100       Depth (cm):  0.2       Total Area Debrided (sq cm):  1.08    Depth of debridement:  Subcutaneous tissue    Tissue debrided:  Epidermis, Dermis and Subcutaneous    Devitalized tissue debrided:  Callus and Fibrin    Instruments:  Curette    3rd Wound Details:     Location:  Left foot    Location:  Left Midfoot    Location:  Left Midfoot    Type of Debridement:  Excisional       Length (cm):  0.7       Area (sq cm):  0.35       Width (cm):  0.5       Percent Debrided (%):  100       Depth (cm):  0.4       Total Area Debrided (sq cm):  0.35    Depth of debridement:  Subcutaneous tissue    Tissue debrided:  Dermis, Epidermis and Subcutaneous    Devitalized tissue debrided:  Clots and Fibrin    Instruments:  Curette    Bleeding:  Minimal  Hemostasis Achieved: Yes    Method Used:  Pressure  Patient tolerance:  Patient tolerated the procedure well with no immediate complications        Orders Placed This Encounter   Procedures    Debridement     This order was created via procedure documentation     Standing Status:   Standing     Number of Occurrences:   1    Change dressing     Wound Dressing Orders     Dressing change frequency once a week   Remove old dressing   Cleanse or irrigate with: Normal Saline   Primary dressing - adjust to fit: WOUND BASE: Iodoflex  Secondary dressing: Non-Bordered Foam (marycarmen foam short x2 to medial feet wounds, marycarmen foam long x1 to left lateral plantar wound)   Off-load: Football (cast padding x3) and Darco shoe on the affected foot   Compression: Tubi- size E (right calf-37cm, left calf  36cm)        Follow up in about 1 week (around 7/29/2022) for wound care visit.

## 2022-07-29 ENCOUNTER — HOSPITAL ENCOUNTER (OUTPATIENT)
Dept: WOUND CARE | Facility: HOSPITAL | Age: 57
Discharge: HOME | End: 2022-07-29
Attending: PODIATRIST
Payer: COMMERCIAL

## 2022-07-29 VITALS — DIASTOLIC BLOOD PRESSURE: 93 MMHG | SYSTOLIC BLOOD PRESSURE: 187 MMHG | TEMPERATURE: 98 F | HEART RATE: 85 BPM

## 2022-07-29 DIAGNOSIS — L97.422 DIABETIC ULCER OF LEFT MIDFOOT ASSOCIATED WITH TYPE 2 DIABETES MELLITUS, WITH FAT LAYER EXPOSED: ICD-10-CM

## 2022-07-29 DIAGNOSIS — E11.621 DIABETIC ULCER OF LEFT MIDFOOT ASSOCIATED WITH TYPE 2 DIABETES MELLITUS, WITH FAT LAYER EXPOSED: ICD-10-CM

## 2022-07-29 DIAGNOSIS — L97.512 FOOT ULCER, RIGHT, WITH FAT LAYER EXPOSED: Primary | ICD-10-CM

## 2022-07-29 DIAGNOSIS — L97.522 FOOT ULCERATION, LEFT, WITH FAT LAYER EXPOSED: ICD-10-CM

## 2022-07-29 PROCEDURE — 99499 NO LOS: ICD-10-PCS | Mod: ,,, | Performed by: PODIATRIST

## 2022-07-29 PROCEDURE — 11042 WOUND DEBRIDEMENT: ICD-10-PCS | Mod: ,,, | Performed by: PODIATRIST

## 2022-07-29 PROCEDURE — 99499 UNLISTED E&M SERVICE: CPT | Mod: ,,, | Performed by: PODIATRIST

## 2022-07-29 PROCEDURE — 11042 DBRDMT SUBQ TIS 1ST 20SQCM/<: CPT | Performed by: PODIATRIST

## 2022-07-29 PROCEDURE — 11042 DBRDMT SUBQ TIS 1ST 20SQCM/<: CPT | Mod: ,,, | Performed by: PODIATRIST

## 2022-07-29 NOTE — PROGRESS NOTES
[facility]                Progress Note    Subjective:       Patient ID: Catalino Mcfadden is a 57 y.o. male.    Chief Complaint: Wound Check    Follow up wound care visit. Patient ambulated to exam room unaided, with prescribed bilateral Darco shoes on. No c/o pain at present. Dressing's intact with small to scant amount of serosanguineous, dried, non-malodor drainage from Bilateral Medial Foot wounds, no drainage from Left Lateral Plantar Foot wound. Patient reports drinking Reuben to help aid with wound healing.     Systolic B/P elevated 180's, patient denies shortness of breath, dizziness or headache at present, reports taking B/P medication as ordered, but admits to having chili from [employer] yesterday afternoon. Patient reminded about consuming high sodium foods especially at restaurants, patient verbalized understanding. Wound care done as per order. RTC in 1 week.           Review of Systems   Constitutional: Negative for activity change, appetite change and fever.   HENT: Negative for congestion.    Respiratory: Negative for shortness of breath and wheezing.    Cardiovascular: Positive for leg swelling. Negative for chest pain.   Gastrointestinal: Negative for abdominal pain, nausea and vomiting.   Musculoskeletal: Positive for myalgias.   Skin: Positive for wound.   Neurological: Negative for dizziness and headaches.   Psychiatric/Behavioral: The patient is not nervous/anxious.          Objective:        Physical Exam  Vitals and nursing note reviewed.   Constitutional:       General: He is not in acute distress.     Appearance: He is not toxic-appearing or diaphoretic.      Comments: Pt. is well-developed, well-nourished, appears stated age, in no acute distress, alert and oriented x 3. No evidence of depression, anxiety, or agitation. Calm, cooperative, and communicative. Appropriate interactions and affect.   Cardiovascular:      Pulses:           Dorsalis pedis  pulses are 2+ on the right side and 2+ on the left side.        Posterior tibial pulses are 1+ on the right side and 1+ on the left side.      Comments: There is decreased digital hair. Skin is atrophic, slightly hyperpigmented  Pulmonary:      Effort: No respiratory distress.   Musculoskeletal:      Right ankle: No swelling. No tenderness. No lateral malleolus, medial malleolus, AITF ligament, CF ligament or posterior TF ligament tenderness. Normal range of motion.      Right Achilles Tendon: No defects. Hilliard's test negative.      Left ankle: No swelling. No tenderness. No lateral malleolus, medial malleolus, AITF ligament, CF ligament or posterior TF ligament tenderness. Normal range of motion.      Left Achilles Tendon: No defects. Hilliard's test negative.      Right foot: No tenderness or bony tenderness.      Left foot: No tenderness or bony tenderness.      Comments: Decreased stride, station of gait.  apropulsive toe off.  Increased angle and base of gait.    There is equinus deformity bilateral with decreased dorsiflexion at the ankle joint bilateral. No tenderness with compression of heel. Negative tinels sign. Gait analysis reveals excessive pronation through midstance and propulsion with early heel off.     Patient has hammertoes of digits 2-5 bilateral partially reducible     Decreased first MPJ range of motion both weightbearing and nonweightbearing, no crepitus observed the first MP joint, + dorsal flag sign.     Visible and palpable bunion without pain at dorsomedial 1st metatarsal head right and left.  Hallux abducted right and left partially reducible, tracks laterally without being track bound.  No ecchymosis, erythema, edema, or cardinal signs infection or signs of trauma same foot.    Fat pad atrophy to heels and met heads bilateral    Plantarflexed 1st ray RLE   Lymphadenopathy:      Comments: No lymphatic streaking    Negative lymphadenopathy bilateral popliteal fossa and tarsal tunnel.      Skin:     General: Skin is warm and dry.      Coloration: Skin is not pale.      Findings: Erythema and lesion (see wound description below) present. No abrasion, ecchymosis, laceration or rash.      Nails: There is no clubbing.      Comments: Toenails 1-5 bilaterally discolored/yellowed, dystrophic, brittle with subungual debris.    Neurological:      Sensory: Sensory deficit present.      Comments:  Helm-Dayton 5.07 monofilamant testing is diminished Kp feet. Decreased/absent vibratory sensation bilateral feet to 128Hz tuning fork.     Paresthesias, and hyperesthesia bilateral feet with no clearly identified trigger or source.           Psychiatric:         Attention and Perception: He is attentive.         Mood and Affect: Mood is not anxious. Affect is not inappropriate.         Speech: He is communicative. Speech is not slurred.         Behavior: Behavior is not combative.           Vitals:    07/29/22 0709   BP: (!) 187/93   Pulse: 85   Temp: 97.5 °F (36.4 °C)       Assessment:           ICD-10-CM ICD-9-CM   1. Foot ulcer, right, with fat layer exposed  L97.512 707.15   2. Foot ulceration, left, with fat layer exposed  L97.522 707.15   3. Diabetic ulcer of left midfoot associated with type 2 diabetes mellitus, with fat layer exposed  E11.621 250.80    L97.422 707.14            Wound 07/06/22 0920 Diabetic Ulcer Right medial Foot (Active)   07/06/22 0920    Pre-existing:    Primary Wound Type: Diabetic ulc   Side: Right   Orientation: medial   Location: Foot   Wound Number:    Ankle-Brachial Index:    Pulses:    Removal Indication and Assessment:    Wound Outcome:    (Retired) Wound Type:    (Retired) Wound Length (cm):    (Retired) Wound Width (cm):    (Retired) Depth (cm):    Wound Description (Comments):    Removal Indications:    Wound Image    07/29/22 0700   Wound WDL ex 07/29/22 0700   Dressing Appearance Intact;Dried drainage 07/29/22 0700   Drainage Amount Small 07/29/22 0700   Drainage  Characteristics/Odor Serosanguineous 07/29/22 0700   Appearance Red 07/29/22 0700   Tissue loss description Partial thickness 07/29/22 0700   Black (%), Wound Tissue Color 0 % 07/29/22 0700   Red (%), Wound Tissue Color 100 % 07/29/22 0700   Yellow (%), Wound Tissue Color 0 % 07/29/22 0700   Periwound Area Dry;Intact 07/29/22 0700   Wound Edges Defined 07/29/22 0700   Wound Length (cm) 0.6 cm 07/29/22 0700   Wound Width (cm) 0.4 cm 07/29/22 0700   Wound Depth (cm) 0.2 cm 07/29/22 0700   Wound Volume (cm^3) 0.048 cm^3 07/29/22 0700   Wound Surface Area (cm^2) 0.24 cm^2 07/29/22 0700   Care Cleansed with:;Antimicrobial agent;Sterile normal saline 07/29/22 0700   Dressing Changed 07/29/22 0700   Compression Tubular elasticized bandage 07/29/22 0700   Off Loading Football dressing;Off loading shoe 07/29/22 0700   Dressing Change Due 08/05/22 07/29/22 0700            Wound 07/06/22 0921 Diabetic Ulcer Left medial Foot (Active)   07/06/22 0921    Pre-existing:    Primary Wound Type: Diabetic ulc   Side: Left   Orientation: medial   Location: Foot   Wound Number:    Ankle-Brachial Index:    Pulses:    Removal Indication and Assessment:    Wound Outcome:    (Retired) Wound Type:    (Retired) Wound Length (cm):    (Retired) Wound Width (cm):    (Retired) Depth (cm):    Wound Description (Comments):    Removal Indications:    Wound Image    07/29/22 0700   Wound WDL ex 07/29/22 0700   Dressing Appearance Dry;Intact 07/29/22 0700   Drainage Amount Scant 07/29/22 0700   Drainage Characteristics/Odor Serosanguineous 07/29/22 0700   Appearance Red;Moist 07/29/22 0700   Tissue loss description Partial thickness 07/29/22 0700   Black (%), Wound Tissue Color 0 % 07/29/22 0700   Red (%), Wound Tissue Color 100 % 07/29/22 0700   Yellow (%), Wound Tissue Color 0 % 07/29/22 0700   Periwound Area Dry;Intact 07/29/22 0700   Wound Edges Callused 07/29/22 0700   Wound Length (cm) 0.7 cm 07/29/22 0700   Wound Width (cm) 0.7 cm 07/29/22 0700    Wound Depth (cm) 0.2 cm 07/29/22 0700   Wound Volume (cm^3) 0.098 cm^3 07/29/22 0700   Wound Surface Area (cm^2) 0.49 cm^2 07/29/22 0700   Care Cleansed with:;Antimicrobial agent;Sterile normal saline;Debrided 07/29/22 0700   Dressing Changed 07/29/22 0700   Compression Tubular elasticized bandage 07/29/22 0700   Off Loading Football dressing;Off loading shoe 07/29/22 0700   Dressing Change Due 08/05/22 07/29/22 0700            Wound 07/06/22 0921 Diabetic Ulcer Left lateral;plantar Foot (Active)   07/06/22 0921    Pre-existing:    Primary Wound Type: Diabetic ulc   Side: Left   Orientation: lateral;plantar   Location: Foot   Wound Number:    Ankle-Brachial Index:    Pulses:    Removal Indication and Assessment:    Wound Outcome:    (Retired) Wound Type:    (Retired) Wound Length (cm):    (Retired) Wound Width (cm):    (Retired) Depth (cm):    Wound Description (Comments):    Removal Indications:    Wound Image    07/29/22 0700   Wound WDL ex 07/29/22 0700   Dressing Appearance Intact;Dry 07/29/22 0700   Drainage Amount None 07/29/22 0700   Appearance Fibrin;Red 07/29/22 0700   Tissue loss description Partial thickness 07/29/22 0700   Black (%), Wound Tissue Color 0 % 07/29/22 0700   Red (%), Wound Tissue Color 100 % 07/29/22 0700   Yellow (%), Wound Tissue Color 0 % 07/29/22 0700   Periwound Area Dry 07/29/22 0700   Wound Edges Callused 07/29/22 0700   Wound Length (cm) 1.5 cm 07/29/22 0700   Wound Width (cm) 1.2 cm 07/29/22 0700   Wound Depth (cm) 0.3 cm 07/29/22 0700   Wound Volume (cm^3) 0.54 cm^3 07/29/22 0700   Wound Surface Area (cm^2) 1.8 cm^2 07/29/22 0700   Care Cleansed with:;Antimicrobial agent;Sterile normal saline;Debrided 07/29/22 0700   Dressing Changed 07/29/22 0700   Compression Tubular elasticized bandage 07/29/22 0700   Off Loading Football dressing;Off loading shoe 07/29/22 0700   Dressing Change Due 08/05/22 07/29/22 0700           Plan:              Wound Debridement    Date/Time: 7/29/2022  6:46 AM  Performed by: Aviva Martinez DPM  Authorized by: Aviva Martinez DPM     Consent Done?:  Yes (Written)    Wound Details:    Location:  Right foot    Location:  Right 1st Metatarsal Head    Type of Debridement:  Excisional       Length (cm):  0.6       Area (sq cm):  0.24       Width (cm):  0.4       Percent Debrided (%):  100       Depth (cm):  0.2       Total Area Debrided (sq cm):  0.24    Depth of debridement:  Subcutaneous tissue    Tissue debrided:  Subcutaneous, Epidermis and Dermis    Devitalized tissue debrided:  Callus and Fibrin    Instruments:  Curette    Additional wounds:  2    2nd Wound Details:     Location:  Left foot    Location:  Left 1st Metatarsal Head    Location:  Left 1st Metatarsal Head    Type of Debridement:  Excisional       Length (cm):  0.7       Area (sq cm):  0.49       Width (cm):  0.7       Percent Debrided (%):  100       Depth (cm):  0.2       Total Area Debrided (sq cm):  0.49    Depth of debridement:  Subcutaneous tissue    Tissue debrided:  Dermis, Epidermis and Subcutaneous    Devitalized tissue debrided:  Callus and Fibrin    Instruments:  Curette    3rd Wound Details:     Location:  Left foot    Location:  Left Plantar    Location:  Left Plantar    Type of Debridement:  Excisional       Length (cm):  1.5       Area (sq cm):  1.8       Width (cm):  1.2       Percent Debrided (%):  100       Depth (cm):  0.3       Total Area Debrided (sq cm):  1.8    Depth of debridement:  Subcutaneous tissue    Tissue debrided:  Dermis, Epidermis and Subcutaneous    Devitalized tissue debrided:  Fibrin and Callus    Instruments:  Curette    Bleeding:  Minimal  Hemostasis Achieved: Yes    Method Used:  Pressure  Patient tolerance:  Patient tolerated the procedure well with no immediate complications      Orders Placed This Encounter   Procedures    Debridement     This order was created via procedure documentation     Standing Status:   Standing     Number of Occurrences:   1     Change dressing     Wound Dressing Orders     Dressing change frequency once a week   Remove old dressing   Cleanse or irrigate with: Normal Saline   Periwound: Cavilon  Primary dressing - adjust to fit: WOUND BASE: Iodoflex (Left Lateral Foot), Francesca (cut to fit - Bilateral Medial Foot wounds)  Secondary dressing: Non-Bordered Foam (marycarmen foam short x2 to medial feet wounds secured with Medipore tape, marycarmen foam long x2 to bilateral feet)   Off-load: Football (cast padding x3) and Darco shoe on bilateral feet   Compression: Tubi- size E (right calf-37cm, left calf 36cm)        Follow up in about 1 week (around 8/5/2022) for wound care.

## 2022-08-05 ENCOUNTER — HOSPITAL ENCOUNTER (OUTPATIENT)
Dept: WOUND CARE | Facility: HOSPITAL | Age: 57
Discharge: HOME | End: 2022-08-05
Attending: PODIATRIST
Payer: COMMERCIAL

## 2022-08-05 VITALS — DIASTOLIC BLOOD PRESSURE: 86 MMHG | HEART RATE: 80 BPM | TEMPERATURE: 97 F | SYSTOLIC BLOOD PRESSURE: 142 MMHG

## 2022-08-05 PROCEDURE — 99215 OFFICE O/P EST HI 40 MIN: CPT | Performed by: FAMILY MEDICINE

## 2022-08-05 NOTE — PROGRESS NOTES
[facility] [address] [place]  [address]  Nurse Visit    Subjective:       Patient seen in clinic today walking with no aid.  Dressing changed as ordered. No drainage Dsg intact      Assessment:          Wound 07/06/22 0920 Diabetic Ulcer Right medial Foot (Active)   07/06/22 0920    Pre-existing:    Primary Wound Type: Diabetic ulc   Side: Right   Orientation: medial   Location: Foot   Wound Number:    Ankle-Brachial Index:    Pulses:    Removal Indication and Assessment:    Wound Outcome:    (Retired) Wound Type:    (Retired) Wound Length (cm):    (Retired) Wound Width (cm):    (Retired) Depth (cm):    Wound Description (Comments):    Removal Indications:    Wound WDL ex 08/05/22 0700   Dressing Appearance Dry;Intact 08/05/22 0700   Drainage Amount None 08/05/22 0700   Appearance Pink;Red 08/05/22 0700   Tissue loss description Partial thickness 08/05/22 0700   Red (%), Wound Tissue Color 100 % 08/05/22 0700   Periwound Area Dry 08/05/22 0700   Wound Edges Defined 08/05/22 0700   Care Cleansed with:;Antimicrobial agent;Sterile normal saline 08/05/22 0700   Dressing Changed 08/05/22 0700   Compression Tubular elasticized bandage 08/05/22 0700   Dressing Change Due 08/12/22 08/05/22 0700            Wound 07/06/22 0921 Diabetic Ulcer Left medial Foot (Active)   07/06/22 0921    Pre-existing:    Primary Wound Type: Diabetic ulc   Side: Left   Orientation: medial   Location: Foot   Wound Number:    Ankle-Brachial Index:    Pulses:    Removal Indication and Assessment:    Wound Outcome:    (Retired) Wound Type:    (Retired) Wound Length (cm):    (Retired) Wound Width (cm):    (Retired) Depth (cm):    Wound Description (Comments):    Removal Indications:    Wound WDL ex 08/05/22 0700   Dressing Appearance Dry;Intact 08/05/22 0700   Drainage Amount None 08/05/22 0700   Appearance Red 08/05/22 0700   Tissue loss description Partial thickness 08/05/22 0700   Red (%), Wound Tissue Color  100 % 08/05/22 0700   Periwound Area Dry;Intact 08/05/22 0700   Wound Edges Defined 08/05/22 0700   Care Cleansed with:;Antimicrobial agent;Sterile normal saline 08/05/22 0700   Dressing Changed 08/05/22 0700   Compression Tubular elasticized bandage 08/05/22 0700   Dressing Change Due 08/12/22 08/05/22 0700            Wound 07/06/22 0921 Diabetic Ulcer Left lateral;plantar Foot (Active)   07/06/22 0921    Pre-existing:    Primary Wound Type: Diabetic ulc   Side: Left   Orientation: lateral;plantar   Location: Foot   Wound Number:    Ankle-Brachial Index:    Pulses:    Removal Indication and Assessment:    Wound Outcome:    (Retired) Wound Type:    (Retired) Wound Length (cm):    (Retired) Wound Width (cm):    (Retired) Depth (cm):    Wound Description (Comments):    Removal Indications:    Wound WDL ex 08/05/22 0700   Dressing Appearance Dry;Intact 08/05/22 0700   Drainage Amount None 08/05/22 0700   Appearance Red 08/05/22 0700   Tissue loss description Partial thickness 08/05/22 0700   Red (%), Wound Tissue Color 100 % 08/05/22 0700   Periwound Area Dry;Intact 08/05/22 0700   Wound Edges Defined 08/05/22 0700   Care Cleansed with:;Antimicrobial agent;Sterile normal saline 08/05/22 0700   Compression Tubular elasticized bandage 08/05/22 0700   Dressing Change Due 08/12/22 08/05/22 0700           Plan:     No orders of the defined types were placed in this encounter.          Follow up in about 1 week (around 8/12/2022).        [facility]                Progress Note    Subjective:       Patient ID: Catalino Mcfadden is a 57 y.o. male.    Chief Complaint: Wound Check    HPI    Review of Systems      Objective:        Physical Exam    Vitals:    08/05/22 0746   BP: (!) 142/86   Pulse: 80   Temp: 97.4 °F (36.3 °C)       Assessment:         No diagnosis found.         Wound 07/06/22 0920 Diabetic Ulcer Right medial Foot (Active)   07/06/22 0920    Pre-existing:    Primary Wound  Type: Diabetic ulc   Side: Right   Orientation: medial   Location: Foot   Wound Number:    Ankle-Brachial Index:    Pulses:    Removal Indication and Assessment:    Wound Outcome:    (Retired) Wound Type:    (Retired) Wound Length (cm):    (Retired) Wound Width (cm):    (Retired) Depth (cm):    Wound Description (Comments):    Removal Indications:    Wound WDL ex 08/05/22 0700   Dressing Appearance Dry;Intact 08/05/22 0700   Drainage Amount None 08/05/22 0700   Appearance Pink;Red 08/05/22 0700   Tissue loss description Partial thickness 08/05/22 0700   Red (%), Wound Tissue Color 100 % 08/05/22 0700   Periwound Area Dry 08/05/22 0700   Wound Edges Defined 08/05/22 0700   Care Cleansed with:;Antimicrobial agent;Sterile normal saline 08/05/22 0700   Dressing Changed 08/05/22 0700   Compression Tubular elasticized bandage 08/05/22 0700   Dressing Change Due 08/12/22 08/05/22 0700            Wound 07/06/22 0921 Diabetic Ulcer Left medial Foot (Active)   07/06/22 0921    Pre-existing:    Primary Wound Type: Diabetic ulc   Side: Left   Orientation: medial   Location: Foot   Wound Number:    Ankle-Brachial Index:    Pulses:    Removal Indication and Assessment:    Wound Outcome:    (Retired) Wound Type:    (Retired) Wound Length (cm):    (Retired) Wound Width (cm):    (Retired) Depth (cm):    Wound Description (Comments):    Removal Indications:    Wound WDL ex 08/05/22 0700   Dressing Appearance Dry;Intact 08/05/22 0700   Drainage Amount None 08/05/22 0700   Appearance Red 08/05/22 0700   Tissue loss description Partial thickness 08/05/22 0700   Red (%), Wound Tissue Color 100 % 08/05/22 0700   Periwound Area Dry;Intact 08/05/22 0700   Wound Edges Defined 08/05/22 0700   Care Cleansed with:;Antimicrobial agent;Sterile normal saline 08/05/22 0700   Dressing Changed 08/05/22 0700   Compression Tubular elasticized bandage 08/05/22 0700   Dressing Change Due 08/12/22 08/05/22 0700            Wound 07/06/22 0921 Diabetic  Ulcer Left lateral;plantar Foot (Active)   07/06/22 0921    Pre-existing:    Primary Wound Type: Diabetic ulc   Side: Left   Orientation: lateral;plantar   Location: Foot   Wound Number:    Ankle-Brachial Index:    Pulses:    Removal Indication and Assessment:    Wound Outcome:    (Retired) Wound Type:    (Retired) Wound Length (cm):    (Retired) Wound Width (cm):    (Retired) Depth (cm):    Wound Description (Comments):    Removal Indications:    Wound WDL ex 08/05/22 0700   Dressing Appearance Dry;Intact 08/05/22 0700   Drainage Amount None 08/05/22 0700   Appearance Red 08/05/22 0700   Tissue loss description Partial thickness 08/05/22 0700   Red (%), Wound Tissue Color 100 % 08/05/22 0700   Periwound Area Dry;Intact 08/05/22 0700   Wound Edges Defined 08/05/22 0700   Care Cleansed with:;Antimicrobial agent;Sterile normal saline 08/05/22 0700   Compression Tubular elasticized bandage 08/05/22 0700   Dressing Change Due 08/12/22 08/05/22 0700           Plan:                No orders of the defined types were placed in this encounter.       Follow up in about 1 week (around 8/12/2022).

## 2022-08-11 ENCOUNTER — TELEPHONE (OUTPATIENT)
Dept: FAMILY MEDICINE | Facility: CLINIC | Age: 57
End: 2022-08-11
Payer: COMMERCIAL

## 2022-08-11 NOTE — LETTER
AUTHORIZATION FOR RELEASE OF   CONFIDENTIAL INFORMATION        We are seeing Catalino Mcfadden, date of birth 1965, in the clinic at [facility]. Azikiwe K Lombard, MD is the patient's PCP. Catalino Mcfadden has an outstanding lab/procedure at the time we reviewed his chart. In order to help keep his health information updated, he has authorized us to request the following medical record(s):        (  )  MAMMOGRAM                                      (  )  COLONOSCOPY      (  )  PAP SMEAR                                          (  )  OUTSIDE LAB RESULTS     (  )  DEXA SCAN                                          ( X )  EYE EXAM            (  )  FOOT EXAM                                          (  )  ENTIRE RECORD     (  )  OUTSIDE IMMUNIZATIONS                 (  )  _______________         Please fax records to [facility], Azikiwe K Lombard, MD, [phone].     If you have any questions, please contact us at [phone].         Patient Name: Catalino Mcfadden  : 1965  Patient Phone #: [phone]

## 2022-08-11 NOTE — TELEPHONE ENCOUNTER
Due for eye exam. Patient stated already had eye exam done within 12 months with [facility] [place].  [facility]

## 2022-08-12 ENCOUNTER — HOSPITAL ENCOUNTER (OUTPATIENT)
Dept: WOUND CARE | Facility: HOSPITAL | Age: 57
Discharge: HOME | End: 2022-08-12
Attending: PODIATRIST
Payer: COMMERCIAL

## 2022-08-12 VITALS — SYSTOLIC BLOOD PRESSURE: 197 MMHG | DIASTOLIC BLOOD PRESSURE: 97 MMHG | HEART RATE: 81 BPM | TEMPERATURE: 98 F

## 2022-08-12 DIAGNOSIS — L97.422 DIABETIC ULCER OF LEFT MIDFOOT ASSOCIATED WITH TYPE 2 DIABETES MELLITUS, WITH FAT LAYER EXPOSED: ICD-10-CM

## 2022-08-12 DIAGNOSIS — L97.522 FOOT ULCERATION, LEFT, WITH FAT LAYER EXPOSED: ICD-10-CM

## 2022-08-12 DIAGNOSIS — E11.621 DIABETIC ULCER OF LEFT MIDFOOT ASSOCIATED WITH TYPE 2 DIABETES MELLITUS, WITH FAT LAYER EXPOSED: ICD-10-CM

## 2022-08-12 DIAGNOSIS — L97.512 FOOT ULCER, RIGHT, WITH FAT LAYER EXPOSED: Primary | ICD-10-CM

## 2022-08-12 PROCEDURE — 11042 DBRDMT SUBQ TIS 1ST 20SQCM/<: CPT | Performed by: PODIATRIST

## 2022-08-12 PROCEDURE — 11042 DBRDMT SUBQ TIS 1ST 20SQCM/<: CPT | Mod: ,,, | Performed by: PODIATRIST

## 2022-08-12 PROCEDURE — 99499 UNLISTED E&M SERVICE: CPT | Mod: ,,, | Performed by: PODIATRIST

## 2022-08-12 PROCEDURE — 99499 NO LOS: ICD-10-PCS | Mod: ,,, | Performed by: PODIATRIST

## 2022-08-12 PROCEDURE — 11042 DEBRIDEMENT: ICD-10-PCS | Mod: ,,, | Performed by: PODIATRIST

## 2022-08-12 NOTE — PROGRESS NOTES
"[facility]                Progress Note    Subjective:       Patient ID: Catalino Mcfadden is a 57 y.o. male.    Chief Complaint: Wound Check    Follow up wound care visit. Patient ambulated to exam room unaided with prescribed Darco shoes on bilateral feet. No c/o pain at present. Dressings intact with small to moderate amounts of dried, serosanguineous, non-malodor drainage.      B/P highly elevated 190s/90s, patient reports taking AM HTN, but also reports eating high sodium food yesterday "2 packs of ramen noodle cups".      Review of Systems   Constitutional: Negative for activity change, appetite change and fever.   HENT: Negative for congestion.    Respiratory: Negative for shortness of breath and wheezing.    Cardiovascular: Positive for leg swelling. Negative for chest pain.   Gastrointestinal: Negative for abdominal pain, nausea and vomiting.   Musculoskeletal: Positive for myalgias.   Skin: Positive for wound.   Neurological: Negative for dizziness and headaches.   Psychiatric/Behavioral: The patient is not nervous/anxious.          Objective:        Physical Exam  Vitals and nursing note reviewed.   Constitutional:       General: He is not in acute distress.     Appearance: He is not toxic-appearing or diaphoretic.      Comments: Pt. is well-developed, well-nourished, appears stated age, in no acute distress, alert and oriented x 3. No evidence of depression, anxiety, or agitation. Calm, cooperative, and communicative. Appropriate interactions and affect.   Cardiovascular:      Pulses:           Dorsalis pedis pulses are 2+ on the right side and 2+ on the left side.        Posterior tibial pulses are 1+ on the right side and 1+ on the left side.      Comments: There is decreased digital hair. Skin is atrophic, slightly hyperpigmented  Pulmonary:      Effort: No respiratory distress.   Musculoskeletal:      Right ankle: No swelling. No tenderness. No lateral " malleolus, medial malleolus, AITF ligament, CF ligament or posterior TF ligament tenderness. Normal range of motion.      Right Achilles Tendon: No defects. Hilliard's test negative.      Left ankle: No swelling. No tenderness. No lateral malleolus, medial malleolus, AITF ligament, CF ligament or posterior TF ligament tenderness. Normal range of motion.      Left Achilles Tendon: No defects. Hilliard's test negative.      Right foot: No tenderness or bony tenderness.      Left foot: No tenderness or bony tenderness.      Comments: Decreased stride, station of gait.  apropulsive toe off.  Increased angle and base of gait.    There is equinus deformity bilateral with decreased dorsiflexion at the ankle joint bilateral. No tenderness with compression of heel. Negative tinels sign. Gait analysis reveals excessive pronation through midstance and propulsion with early heel off.     Patient has hammertoes of digits 2-5 bilateral partially reducible     Decreased first MPJ range of motion both weightbearing and nonweightbearing, no crepitus observed the first MP joint, + dorsal flag sign.     Visible and palpable bunion without pain at dorsomedial 1st metatarsal head right and left.  Hallux abducted right and left partially reducible, tracks laterally without being track bound.  No ecchymosis, erythema, edema, or cardinal signs infection or signs of trauma same foot.    Fat pad atrophy to heels and met heads bilateral    Plantarflexed 1st ray RLE   Lymphadenopathy:      Comments: No lymphatic streaking    Negative lymphadenopathy bilateral popliteal fossa and tarsal tunnel.     Skin:     General: Skin is warm and dry.      Coloration: Skin is not pale.      Findings: Lesion (see wound descriptions below) present. No abrasion, ecchymosis, laceration or rash.      Nails: There is no clubbing.      Comments: Toenails 1-5 bilaterally discolored/yellowed, dystrophic, brittle with subungual debris.    Neurological:      Sensory:  Sensory deficit present.      Comments:  [place] 5.07 monofilamant testing is diminished Kp feet. Decreased/absent vibratory sensation bilateral feet to 128Hz tuning fork.     Paresthesias, and hyperesthesia bilateral feet with no clearly identified trigger or source.           Psychiatric:         Attention and Perception: He is attentive.         Mood and Affect: Mood is not anxious. Affect is not inappropriate.         Speech: He is communicative. Speech is not slurred.         Behavior: Behavior is not combative.           Vitals:    08/12/22 0706   BP: (!) 197/97   Pulse: 81   Temp: 97.7 °F (36.5 °C)       Assessment:           ICD-10-CM ICD-9-CM   1. Foot ulcer, right, with fat layer exposed  L97.512 707.15   2. Foot ulceration, left, with fat layer exposed  L97.522 707.15   3. Diabetic ulcer of left midfoot associated with type 2 diabetes mellitus, with fat layer exposed  E11.621 250.80    L97.422 707.14            Wound 07/06/22 0920 Diabetic Ulcer Right medial Foot (Active)   07/06/22 0920    Pre-existing:    Primary Wound Type: Diabetic ulc   Side: Right   Orientation: medial   Location: Foot   Wound Number:    Ankle-Brachial Index:    Pulses:    Removal Indication and Assessment:    Wound Outcome:    (Retired) Wound Type:    (Retired) Wound Length (cm):    (Retired) Wound Width (cm):    (Retired) Depth (cm):    Wound Description (Comments):    Removal Indications:    Wound Image    08/12/22 0700   Wound WDL ex 08/12/22 0700   Dressing Appearance Intact;Moist drainage 08/12/22 0700   Drainage Amount Small 08/12/22 0700   Drainage Characteristics/Odor Serosanguineous;Yellow;No odor 08/12/22 0700   Appearance Yellow;Moist 08/12/22 0700   Tissue loss description Partial thickness 08/12/22 0700   Black (%), Wound Tissue Color 0 % 08/12/22 0700   Red (%), Wound Tissue Color 0 % 08/12/22 0700   Yellow (%), Wound Tissue Color 100 % 08/12/22 0700   Periwound Area Dry;Intact 08/12/22 0700   Wound Edges  Defined 08/12/22 0700   Wound Length (cm) 0.5 cm 08/12/22 0700   Wound Width (cm) 0.3 cm 08/12/22 0700   Wound Depth (cm) 0.2 cm 08/12/22 0700   Wound Volume (cm^3) 0.03 cm^3 08/12/22 0700   Wound Surface Area (cm^2) 0.15 cm^2 08/12/22 0700   Care Cleansed with:;Antimicrobial agent;Sterile normal saline;Debrided 08/12/22 0700   Dressing Changed 08/12/22 0700   Periwound Care Skin barrier film applied 08/12/22 0700   Off Loading Football dressing;Off loading shoe 08/12/22 0700   Dressing Change Due 08/19/22 08/12/22 0700            Wound 07/06/22 0921 Diabetic Ulcer Left medial Foot (Active)   07/06/22 0921    Pre-existing:    Primary Wound Type: Diabetic ulc   Side: Left   Orientation: medial   Location: Foot   Wound Number:    Ankle-Brachial Index:    Pulses:    Removal Indication and Assessment:    Wound Outcome:    (Retired) Wound Type:    (Retired) Wound Length (cm):    (Retired) Wound Width (cm):    (Retired) Depth (cm):    Wound Description (Comments):    Removal Indications:    Wound Image   08/12/22 0700   Wound WDL ex 08/12/22 0700   Dressing Appearance Intact;Dried drainage 08/12/22 0700   Drainage Amount Small 08/12/22 0700   Drainage Characteristics/Odor Serosanguineous 08/12/22 0700   Appearance Epithelialization 08/12/22 0700   Tissue loss description Partial thickness 08/12/22 0700   Black (%), Wound Tissue Color 0 % 08/12/22 0700   Red (%), Wound Tissue Color 100 % 08/12/22 0700   Yellow (%), Wound Tissue Color 0 % 08/12/22 0700   Periwound Area Dry;Intact 08/12/22 0700   Wound Edges Defined 08/12/22 0700   Wound Length (cm) 0 cm 08/12/22 0700   Wound Width (cm) 0 cm 08/12/22 0700   Wound Depth (cm) 0 cm 08/12/22 0700   Wound Volume (cm^3) 0 cm^3 08/12/22 0700   Wound Surface Area (cm^2) 0 cm^2 08/12/22 0700   Tunneling (depth (cm)/location) 0 08/12/22 0700   Undermining (depth (cm)/location) 0 08/12/22 0700   Care Cleansed with:;Antimicrobial agent;Sterile normal saline 08/12/22 0700   Dressing  Changed 08/12/22 0700   Periwound Care Skin barrier film applied 08/12/22 0700   Off Loading Football dressing;Off loading shoe 08/12/22 0700   Dressing Change Due 08/19/22 08/12/22 0700            Wound 07/06/22 0921 Diabetic Ulcer Left lateral;plantar Foot (Active)   07/06/22 0921    Pre-existing:    Primary Wound Type: Diabetic ulc   Side: Left   Orientation: lateral;plantar   Location: Foot   Wound Number:    Ankle-Brachial Index:    Pulses:    Removal Indication and Assessment:    Wound Outcome:    (Retired) Wound Type:    (Retired) Wound Length (cm):    (Retired) Wound Width (cm):    (Retired) Depth (cm):    Wound Description (Comments):    Removal Indications:    Wound Image    08/12/22 0700   Wound WDL ex 08/12/22 0700   Dressing Appearance Intact;Dried drainage 08/12/22 0700   Drainage Amount Moderate 08/12/22 0700   Drainage Characteristics/Odor Serosanguineous 08/12/22 0700   Appearance Red;Moist 08/12/22 0700   Tissue loss description Partial thickness 08/12/22 0700   Black (%), Wound Tissue Color 0 % 08/12/22 0700   Red (%), Wound Tissue Color 100 % 08/12/22 0700   Yellow (%), Wound Tissue Color 0 % 08/12/22 0700   Periwound Area Dry;Intact 08/12/22 0700   Wound Edges Callused 08/12/22 0700   Wound Length (cm) 0.6 cm 08/12/22 0700   Wound Width (cm) 1.2 cm 08/12/22 0700   Wound Depth (cm) 0.1 cm 08/12/22 0700   Wound Volume (cm^3) 0.072 cm^3 08/12/22 0700   Wound Surface Area (cm^2) 0.72 cm^2 08/12/22 0700   Tunneling (depth (cm)/location) 0 08/12/22 0700   Undermining (depth (cm)/location) 0 08/12/22 0700   Care Cleansed with:;Antimicrobial agent;Sterile normal saline;Debrided 08/12/22 0700   Dressing Changed 08/12/22 0700   Periwound Care Skin barrier film applied 08/12/22 0700   Off Loading Football dressing;Off loading shoe 08/12/22 0700   Dressing Change Due 08/19/22 08/12/22 0700           Plan:               Left Lateral Plantar Foot wound measuring smaller in (0.9 cm) length. Left Medial Foot  wound has a thin layer of epithelization covering wound bed. Right Medial Foot wound measuring smaller in (0.1 cm) length and (0.1 cm) width.      Reminded patient to read contents of all foods as high sodium can be found in many foods. To consume lower sodium food options. Patient verbalized understanding. Wound care done as per order, RTC in 1 week.      Debridement    Date/Time: 8/12/2022 6:58 AM  Performed by: Aviva Martinez DPM  Authorized by: Aviva Martinez DPM     Consent Done?:  Yes (Written)    Wound Details:    Location:  Left foot    Type of Debridement:  Excisional       Length (cm):  0.6       Area (sq cm):  0.72       Width (cm):  1.2       Percent Debrided (%):  100       Depth (cm):  0.1       Total Area Debrided (sq cm):  0.72    Depth of debridement:  Subcutaneous tissue    Tissue debrided:  Dermis, Epidermis and Subcutaneous    Devitalized tissue debrided:  Callus and Fibrin    Instruments:  Curette    Bleeding:  Minimal  Hemostasis Achieved: Yes    Method Used:  Pressure  Patient tolerance:  Patient tolerated the procedure well with no immediate complications          Orders Placed This Encounter   Procedures    Debridement     This order was created via procedure documentation     Standing Status:   Standing     Number of Occurrences:   1    Change dressing     Wound Dressing Orders     Dressing change frequency once a week   Remove old dressing   Cleanse or irrigate with: Normal Saline   Periwound: Cavilon   Primary dressing - adjust to fit: WOUND BASE: Iodoflex (Left Lateral Foot), Francesca (cut to fit - Bilateral Medial Foot wounds) then thin layer of Iodoflex  Secondary dressing: Non-Bordered Foam (marycarmen foam short x2 to medial feet wounds secured with Medipore tape, marycarmen foam long x2 to bilateral feet)   Off-load: Football (cast padding x3)  Compression: Tubi-, single layer, size E (right calf-37cm, left calf 36cm)  Darco shoe on bilateral feet        Follow up in about 1 week  (around 8/19/2022) for wound care.

## 2022-08-19 ENCOUNTER — HOSPITAL ENCOUNTER (OUTPATIENT)
Dept: WOUND CARE | Facility: HOSPITAL | Age: 57
Discharge: HOME | End: 2022-08-19
Attending: PODIATRIST
Payer: COMMERCIAL

## 2022-08-19 VITALS — SYSTOLIC BLOOD PRESSURE: 182 MMHG | TEMPERATURE: 98 F | DIASTOLIC BLOOD PRESSURE: 100 MMHG | HEART RATE: 80 BPM

## 2022-08-19 DIAGNOSIS — N18.6 ESRD (END STAGE RENAL DISEASE): ICD-10-CM

## 2022-08-19 DIAGNOSIS — L97.522 FOOT ULCERATION, LEFT, WITH FAT LAYER EXPOSED: Primary | ICD-10-CM

## 2022-08-19 DIAGNOSIS — E11.49 TYPE II DIABETES MELLITUS WITH NEUROLOGICAL MANIFESTATIONS: ICD-10-CM

## 2022-08-19 DIAGNOSIS — L97.512 FOOT ULCER, RIGHT, WITH FAT LAYER EXPOSED: ICD-10-CM

## 2022-08-19 PROCEDURE — 99214 OFFICE O/P EST MOD 30 MIN: CPT | Mod: ,,, | Performed by: PODIATRIST

## 2022-08-19 PROCEDURE — 99215 OFFICE O/P EST HI 40 MIN: CPT | Performed by: PODIATRIST

## 2022-08-19 PROCEDURE — 99214 PR OFFICE/OUTPT VISIT, EST, LEVL IV, 30-39 MIN: ICD-10-PCS | Mod: ,,, | Performed by: PODIATRIST

## 2022-08-19 NOTE — PROGRESS NOTES
[facility]                Progress Note    Subjective:       Patient ID: Catalino Mcfadden is a 57 y.o. male.    Chief Complaint: No chief complaint on file.    Walked to clinic unaided wearing Darco shoes to both feet. Dsg dry and intact. No drainage from wound. Lighter callus than usual. Continue same should be healed by next week. BP elevated took BP meds late. Is eating fast food and ate [employer] yesterday.      Review of Systems   Constitutional:  Negative for activity change, appetite change and fever.   HENT:  Negative for congestion.    Respiratory:  Negative for shortness of breath and wheezing.    Cardiovascular:  Positive for leg swelling. Negative for chest pain.   Gastrointestinal:  Negative for abdominal pain, nausea and vomiting.   Musculoskeletal:  Positive for myalgias.   Skin:  Positive for wound.   Neurological:  Negative for dizziness and headaches.   Psychiatric/Behavioral:  The patient is not nervous/anxious.        Objective:        Physical Exam  Vitals and nursing note reviewed.   Constitutional:       General: He is not in acute distress.     Appearance: He is not toxic-appearing or diaphoretic.      Comments: Pt. is well-developed, well-nourished, appears stated age, in no acute distress, alert and oriented x 3. No evidence of depression, anxiety, or agitation. Calm, cooperative, and communicative. Appropriate interactions and affect.   Cardiovascular:      Pulses:           Dorsalis pedis pulses are 2+ on the right side and 2+ on the left side.        Posterior tibial pulses are 1+ on the right side and 1+ on the left side.      Comments: There is decreased digital hair. Skin is atrophic, slightly hyperpigmented  Pulmonary:      Effort: No respiratory distress.   Musculoskeletal:      Right ankle: No swelling. No tenderness. No lateral malleolus, medial malleolus, AITF ligament, CF ligament or posterior TF ligament tenderness. Normal range of  motion.      Right Achilles Tendon: No defects. Hilliard's test negative.      Left ankle: No swelling. No tenderness. No lateral malleolus, medial malleolus, AITF ligament, CF ligament or posterior TF ligament tenderness. Normal range of motion.      Left Achilles Tendon: No defects. Hilliard's test negative.      Right foot: No tenderness or bony tenderness.      Left foot: No tenderness or bony tenderness.      Comments: Decreased stride, station of gait.  apropulsive toe off.  Increased angle and base of gait.    There is equinus deformity bilateral with decreased dorsiflexion at the ankle joint bilateral. No tenderness with compression of heel. Negative tinels sign. Gait analysis reveals excessive pronation through midstance and propulsion with early heel off.     Patient has hammertoes of digits 2-5 bilateral partially reducible     Decreased first MPJ range of motion both weightbearing and nonweightbearing, no crepitus observed the first MP joint, + dorsal flag sign.     Visible and palpable bunion without pain at dorsomedial 1st metatarsal head right and left.  Hallux abducted right and left partially reducible, tracks laterally without being track bound.  No ecchymosis, erythema, edema, or cardinal signs infection or signs of trauma same foot.    Fat pad atrophy to heels and met heads bilateral    Plantarflexed 1st ray RLE   Lymphadenopathy:      Comments: No lymphatic streaking    Negative lymphadenopathy bilateral popliteal fossa and tarsal tunnel.     Skin:     General: Skin is warm and dry.      Coloration: Skin is not pale.      Findings: Lesion (see wound descriptions below) present. No abrasion, ecchymosis, laceration or rash.      Nails: There is no clubbing.      Comments: Toenails 1-5 bilaterally discolored/yellowed, dystrophic, brittle with subungual debris.    Neurological:      Sensory: Sensory deficit present.      Comments:  [place] 5.07 monofilamant testing is diminished Kp feet.  Decreased/absent vibratory sensation bilateral feet to 128Hz tuning fork.     Paresthesias, and hyperesthesia bilateral feet with no clearly identified trigger or source.           Psychiatric:         Attention and Perception: He is attentive.         Mood and Affect: Mood is not anxious. Affect is not inappropriate.         Speech: He is communicative. Speech is not slurred.         Behavior: Behavior is not combative.       Vitals:    08/19/22 0758   BP: (!) 182/100   Pulse: 80   Temp: 97.7 °F (36.5 °C)       Assessment:           ICD-10-CM ICD-9-CM   1. Foot ulceration, left, with fat layer exposed  L97.522 707.15   2. Foot ulcer, right, with fat layer exposed  L97.512 707.15   3. Type II diabetes mellitus with neurological manifestations  E11.49 250.60   4. ESRD (end stage renal disease)  N18.6 585.6            Wound 07/06/22 0921 Diabetic Ulcer Left lateral;plantar Foot (Active)   07/06/22 0921    Pre-existing:    Primary Wound Type: Diabetic ulc   Side: Left   Orientation: lateral;plantar   Location: Foot   Wound Number:    Ankle-Brachial Index:    Pulses:    Removal Indication and Assessment:    Wound Outcome:    (Retired) Wound Type:    (Retired) Wound Length (cm):    (Retired) Wound Width (cm):    (Retired) Depth (cm):    Wound Description (Comments):    Removal Indications:    Wound Image   08/19/22 0700   Wound WDL WDL 08/19/22 0700   Dressing Appearance Dry;Intact 08/19/22 0700   Drainage Amount None 08/19/22 0700   Appearance Intact;Closed/resurfaced 08/19/22 0700   Tissue loss description Partial thickness 08/19/22 0700   Wound Length (cm) 0 cm 08/19/22 0700   Wound Width (cm) 0 cm 08/19/22 0700   Wound Depth (cm) 0 cm 08/19/22 0700   Wound Volume (cm^3) 0 cm^3 08/19/22 0700   Wound Surface Area (cm^2) 0 cm^2 08/19/22 0700   Dressing Change Due 08/26/22 08/19/22 0700           Plan:              Greater than 50% of this visit spent on counseling and coordination of care.    Education about the  prevention of limb loss.    Discussed wound healing cycle, skin integrity, ways to care for skin.Counseled patient on the effects of blood glucose on healing. He verbalizes understanding that it can increase the chances of delayed healing and this prolonged exposure leads to infection or progression of infection which subsequently can result in loss of limb.    Adequate vitamin supplementation, protein intake, and hydration - discussed with patient. Reminded patient to read contents of all foods as high sodium can be found in many foods. To consume lower sodium food options. Patient verbalized understanding.     Wounds improving    Short-term goals include maintaining good offloading and minimizing bioburden, promoting granulation and epithelialization to healing.  Long-term goals include keeping the wound healed by good offloading and medical management under the direction of internist.    Shoe inspection. Diabetic Foot Education. Patient reminded of the importance of good nutrition and blood sugar control to help prevent podiatric complications of diabetes. Patient instructed on proper foot hygeine. We discussed wearing proper shoe gear, daily foot inspections, never walking without protective shoe gear, never putting sharp instruments to feet.          Orders Placed This Encounter   Procedures    Change dressing     Dampened Francesca to pink areas.  Long Eran foams in perpendicular fashion.  Football x 3 cast padding football to both feet.   Football and Tubigrip inE to both legs        Follow up in about 1 week (around 8/26/2022).

## 2022-08-26 ENCOUNTER — HOSPITAL ENCOUNTER (OUTPATIENT)
Dept: WOUND CARE | Facility: HOSPITAL | Age: 57
Discharge: HOME | End: 2022-08-26
Attending: PODIATRIST
Payer: COMMERCIAL

## 2022-08-26 VITALS — TEMPERATURE: 97 F | SYSTOLIC BLOOD PRESSURE: 170 MMHG | DIASTOLIC BLOOD PRESSURE: 90 MMHG | HEART RATE: 70 BPM

## 2022-08-26 DIAGNOSIS — L97.512 FOOT ULCER, RIGHT, WITH FAT LAYER EXPOSED: ICD-10-CM

## 2022-08-26 DIAGNOSIS — L97.522 FOOT ULCERATION, LEFT, WITH FAT LAYER EXPOSED: Primary | ICD-10-CM

## 2022-08-26 PROCEDURE — 99214 OFFICE O/P EST MOD 30 MIN: CPT | Performed by: PODIATRIST

## 2022-08-26 PROCEDURE — 99214 PR OFFICE/OUTPT VISIT, EST, LEVL IV, 30-39 MIN: ICD-10-PCS | Mod: ,,, | Performed by: PODIATRIST

## 2022-08-26 PROCEDURE — 99214 OFFICE O/P EST MOD 30 MIN: CPT | Mod: ,,, | Performed by: PODIATRIST

## 2022-08-26 PROCEDURE — 11042 DBRDMT SUBQ TIS 1ST 20SQCM/<: CPT | Performed by: PODIATRIST

## 2022-08-26 NOTE — PROGRESS NOTES
[facility]                Progress Note    Subjective:       Patient ID: Catalino Mcfadden is a 57 y.o. male.    Chief Complaint: Wound Check    Walked to clinic unaided wearing Darco on both feet. No new pedal complaints. No trauma.      Review of Systems   Constitutional:  Negative for activity change, appetite change and fever.   HENT:  Negative for congestion.    Respiratory:  Negative for shortness of breath and wheezing.    Cardiovascular:  Positive for leg swelling. Negative for chest pain.   Gastrointestinal:  Negative for abdominal pain, nausea and vomiting.   Musculoskeletal:  Positive for myalgias.   Skin:  Positive for wound.   Neurological:  Negative for dizziness and headaches.   Psychiatric/Behavioral:  The patient is not nervous/anxious.        Objective:        Physical Exam  Vitals and nursing note reviewed.   Constitutional:       General: He is not in acute distress.     Appearance: He is not toxic-appearing or diaphoretic.      Comments: Pt. is well-developed, well-nourished, appears stated age, in no acute distress, alert and oriented x 3. No evidence of depression, anxiety, or agitation. Calm, cooperative, and communicative. Appropriate interactions and affect.   Cardiovascular:      Pulses:           Dorsalis pedis pulses are 2+ on the right side and 2+ on the left side.        Posterior tibial pulses are 1+ on the right side and 1+ on the left side.      Comments: There is decreased digital hair. Skin is atrophic, slightly hyperpigmented  Pulmonary:      Effort: No respiratory distress.   Musculoskeletal:      Right ankle: No swelling. No tenderness. No lateral malleolus, medial malleolus, AITF ligament, CF ligament or posterior TF ligament tenderness. Normal range of motion.      Right Achilles Tendon: No defects. Hilliard's test negative.      Left ankle: No swelling. No tenderness. No lateral malleolus, medial malleolus, AITF ligament, CF ligament  or posterior TF ligament tenderness. Normal range of motion.      Left Achilles Tendon: No defects. Hilliard's test negative.      Right foot: No tenderness or bony tenderness.      Left foot: No tenderness or bony tenderness.      Comments: Decreased stride, station of gait.  apropulsive toe off.  Increased angle and base of gait.    There is equinus deformity bilateral with decreased dorsiflexion at the ankle joint bilateral. No tenderness with compression of heel. Negative tinels sign. Gait analysis reveals excessive pronation through midstance and propulsion with early heel off.     Patient has hammertoes of digits 2-5 bilateral partially reducible     Decreased first MPJ range of motion both weightbearing and nonweightbearing, no crepitus observed the first MP joint, + dorsal flag sign.     Visible and palpable bunion without pain at dorsomedial 1st metatarsal head right and left.  Hallux abducted right and left partially reducible, tracks laterally without being track bound.  No ecchymosis, erythema, edema, or cardinal signs infection or signs of trauma same foot.    Fat pad atrophy to heels and met heads bilateral    Plantarflexed 1st ray RLE   Lymphadenopathy:      Comments: No lymphatic streaking    Negative lymphadenopathy bilateral popliteal fossa and tarsal tunnel.     Skin:     General: Skin is warm and dry.      Coloration: Skin is not pale.      Findings: Lesion (see wound descriptions below) present. No abrasion, ecchymosis, laceration or rash.      Nails: There is no clubbing.      Comments: Toenails 1-5 bilaterally discolored/yellowed, dystrophic, brittle with subungual debris.    Neurological:      Sensory: Sensory deficit present.      Comments:  [place] 5.07 monofilamant testing is diminished Kp feet. Decreased/absent vibratory sensation bilateral feet to 128Hz tuning fork.     Paresthesias, and hyperesthesia bilateral feet with no clearly identified trigger or source.            Psychiatric:         Attention and Perception: He is attentive.         Mood and Affect: Mood is not anxious. Affect is not inappropriate.         Speech: He is communicative. Speech is not slurred.         Behavior: Behavior is not combative.       Vitals:    08/26/22 0746   BP: (!) 170/90   Pulse: 70   Temp: 97.3 °F (36.3 °C)       Assessment:           ICD-10-CM ICD-9-CM   1. Foot ulceration, left, with fat layer exposed  L97.522 707.15   2. Foot ulcer, right, with fat layer exposed  L97.512 707.15            Wound 07/06/22 0921 Diabetic Ulcer Left medial Foot (Active)   07/06/22 0921    Pre-existing:    Primary Wound Type: Diabetic ulc   Side: Left   Orientation: medial   Location: Foot   Wound Number:    Ankle-Brachial Index:    Pulses:    Removal Indication and Assessment:    Wound Outcome:    (Retired) Wound Type:    (Retired) Wound Length (cm):    (Retired) Wound Width (cm):    (Retired) Depth (cm):    Wound Description (Comments):    Removal Indications:    Wound Image   08/26/22 0700   Wound WDL ex 08/26/22 0700   Dressing Appearance Dry;Intact 08/26/22 0700   Drainage Amount Scant 08/26/22 0700   Appearance Pink 08/26/22 0700   Tissue loss description Partial thickness 08/26/22 0700   Red (%), Wound Tissue Color 100 % 08/26/22 0700   Wound Edges Defined 08/26/22 0700   Wound Length (cm) 0 cm 08/26/22 0700   Wound Width (cm) 0 cm 08/26/22 0700   Wound Depth (cm) 0 cm 08/26/22 0700   Wound Volume (cm^3) 0 cm^3 08/26/22 0700   Wound Surface Area (cm^2) 0 cm^2 08/26/22 0700   Care Cleansed with:;Antimicrobial agent;Sterile normal saline 08/26/22 0700   Dressing Applied 08/26/22 0700   Off Loading Football dressing;Off loading shoe 08/26/22 0700   Dressing Change Due 09/02/22 08/26/22 0700           Plan:            Wounds of the left foot virtually healed with thin fragile epithelium.  Pinhole remaining to the right foot    Instructed to bring supportive shoes on next encounter    Orders Placed This  Encounter   Procedures    Change dressing     Right medial foot Iodosorb and football to both feet with long Eran foam in perpendicular fashion over pressure pointa and Tubigrip in F as E not available for medium gradient compression.        Follow up in about 1 week (around 9/2/2022).

## 2022-09-02 ENCOUNTER — HOSPITAL ENCOUNTER (OUTPATIENT)
Dept: WOUND CARE | Facility: HOSPITAL | Age: 57
Discharge: HOME | End: 2022-09-02
Attending: PODIATRIST
Payer: COMMERCIAL

## 2022-09-02 VITALS — SYSTOLIC BLOOD PRESSURE: 154 MMHG | HEART RATE: 74 BPM | TEMPERATURE: 97 F | DIASTOLIC BLOOD PRESSURE: 87 MMHG

## 2022-09-02 DIAGNOSIS — E11.49 TYPE II DIABETES MELLITUS WITH NEUROLOGICAL MANIFESTATIONS: ICD-10-CM

## 2022-09-02 DIAGNOSIS — L97.522 FOOT ULCERATION, LEFT, WITH FAT LAYER EXPOSED: Primary | ICD-10-CM

## 2022-09-02 DIAGNOSIS — N18.6 ESRD (END STAGE RENAL DISEASE): ICD-10-CM

## 2022-09-02 DIAGNOSIS — L97.512 FOOT ULCER, RIGHT, WITH FAT LAYER EXPOSED: ICD-10-CM

## 2022-09-02 PROCEDURE — 99214 OFFICE O/P EST MOD 30 MIN: CPT | Mod: ,,, | Performed by: PODIATRIST

## 2022-09-02 PROCEDURE — 99214 OFFICE O/P EST MOD 30 MIN: CPT | Performed by: PODIATRIST

## 2022-09-02 PROCEDURE — 99214 PR OFFICE/OUTPT VISIT, EST, LEVL IV, 30-39 MIN: ICD-10-PCS | Mod: ,,, | Performed by: PODIATRIST

## 2022-09-02 NOTE — PROGRESS NOTES
[facility]                Progress Note    Subjective:       Patient ID: Catalino Mcfadden is a 57 y.o. male.    Chief Complaint: Wound Check    Walked to clinic unaided for follow up of bilateral foot wounds. No nee pedal complaints    Review of Systems   Constitutional:  Negative for activity change, appetite change and fever.   HENT:  Negative for congestion.    Respiratory:  Negative for shortness of breath and wheezing.    Cardiovascular:  Positive for leg swelling. Negative for chest pain.   Gastrointestinal:  Negative for abdominal pain, nausea and vomiting.   Musculoskeletal:  Positive for myalgias.   Skin:  Positive for wound.   Neurological:  Negative for dizziness and headaches.   Psychiatric/Behavioral:  The patient is not nervous/anxious.        Objective:        Physical Exam  Vitals and nursing note reviewed.   Constitutional:       General: He is not in acute distress.     Appearance: He is not toxic-appearing or diaphoretic.      Comments: Pt. is well-developed, well-nourished, appears stated age, in no acute distress, alert and oriented x 3. No evidence of depression, anxiety, or agitation. Calm, cooperative, and communicative. Appropriate interactions and affect.   Cardiovascular:      Pulses:           Dorsalis pedis pulses are 2+ on the right side and 2+ on the left side.        Posterior tibial pulses are 1+ on the right side and 1+ on the left side.      Comments: There is decreased digital hair. Skin is atrophic, slightly hyperpigmented  Pulmonary:      Effort: No respiratory distress.   Musculoskeletal:      Right ankle: No swelling. No tenderness. No lateral malleolus, medial malleolus, AITF ligament, CF ligament or posterior TF ligament tenderness. Normal range of motion.      Right Achilles Tendon: No defects. Hilliard's test negative.      Left ankle: No swelling. No tenderness. No lateral malleolus, medial malleolus, AITF ligament, CF ligament or  posterior TF ligament tenderness. Normal range of motion.      Left Achilles Tendon: No defects. Hilliard's test negative.      Right foot: No tenderness or bony tenderness.      Left foot: No tenderness or bony tenderness.      Comments: Decreased stride, station of gait.  apropulsive toe off.  Increased angle and base of gait.    There is equinus deformity bilateral with decreased dorsiflexion at the ankle joint bilateral. No tenderness with compression of heel. Negative tinels sign. Gait analysis reveals excessive pronation through midstance and propulsion with early heel off.     Patient has hammertoes of digits 2-5 bilateral partially reducible     Decreased first MPJ range of motion both weightbearing and nonweightbearing, no crepitus observed the first MP joint, + dorsal flag sign.     Visible and palpable bunion without pain at dorsomedial 1st metatarsal head right and left.  Hallux abducted right and left partially reducible, tracks laterally without being track bound.  No ecchymosis, erythema, edema, or cardinal signs infection or signs of trauma same foot.    Fat pad atrophy to heels and met heads bilateral    Plantarflexed 1st ray RLE   Lymphadenopathy:      Comments: No lymphatic streaking    Negative lymphadenopathy bilateral popliteal fossa and tarsal tunnel.     Skin:     General: Skin is warm and dry.      Coloration: Skin is not pale.      Findings: Lesion (see wound descriptions below) present. No abrasion, ecchymosis, laceration or rash.      Nails: There is no clubbing.      Comments: Toenails 1-5 bilaterally discolored/yellowed, dystrophic, brittle with subungual debris.    Neurological:      Sensory: Sensory deficit present.      Comments:  [place] 5.07 monofilamant testing is diminished Kp feet. Decreased/absent vibratory sensation bilateral feet to 128Hz tuning fork.     Paresthesias, and hyperesthesia bilateral feet with no clearly identified trigger or source.            Psychiatric:         Attention and Perception: He is attentive.         Mood and Affect: Mood is not anxious. Affect is not inappropriate.         Speech: He is communicative. Speech is not slurred.         Behavior: Behavior is not combative.       Vitals:    09/02/22 0725   BP: (!) 154/87   Pulse: 74   Temp: 97.2 °F (36.2 °C)       Assessment:           ICD-10-CM ICD-9-CM   1. Foot ulceration, left, with fat layer exposed  L97.522 707.15   2. Foot ulcer, right, with fat layer exposed  L97.512 707.15   3. Type II diabetes mellitus with neurological manifestations  E11.49 250.60   4. ESRD (end stage renal disease)  N18.6 585.6            Wound 07/06/22 0920 Diabetic Ulcer Right medial Foot (Active)   07/06/22 0920    Pre-existing:    Primary Wound Type: Diabetic ulc   Side: Right   Orientation: medial   Location: Foot   Wound Number:    Ankle-Brachial Index:    Pulses:    Removal Indication and Assessment:    Wound Outcome:    (Retired) Wound Type:    (Retired) Wound Length (cm):    (Retired) Wound Width (cm):    (Retired) Depth (cm):    Wound Description (Comments):    Removal Indications:    Wound Image   09/02/22 0700   Wound WDL ex 09/02/22 0700   Dressing Appearance Dry;Intact 09/02/22 0700   Drainage Amount None 09/02/22 0700   Appearance Pink 09/02/22 0700   Wound Length (cm) 0 cm 09/02/22 0700   Wound Width (cm) 0 cm 09/02/22 0700   Wound Depth (cm) 0 cm 09/02/22 0700   Wound Volume (cm^3) 0 cm^3 09/02/22 0700   Wound Surface Area (cm^2) 0 cm^2 09/02/22 0700   Care Cleansed with:;Antimicrobial agent;Sterile normal saline 09/02/22 0700   Dressing Changed 09/02/22 0700   Dressing Change Due 09/23/22 09/02/22 0700            Wound 07/06/22 0921 Diabetic Ulcer Left lateral;plantar Foot (Active)   07/06/22 0921    Pre-existing:    Primary Wound Type: Diabetic ulc   Side: Left   Orientation: lateral;plantar   Location: Foot   Wound Number:    Ankle-Brachial Index:    Pulses:    Removal Indication and Assessment:     Wound Outcome:    (Retired) Wound Type:    (Retired) Wound Length (cm):    (Retired) Wound Width (cm):    (Retired) Depth (cm):    Wound Description (Comments):    Removal Indications:    Wound Image   09/02/22 0700   Wound WDL ex 09/02/22 0700   Dressing Appearance Dry;Intact 09/02/22 0700   Drainage Amount None 09/02/22 0700   Tissue loss description Partial thickness 09/02/22 0700   Wound Length (cm) 0 cm 09/02/22 0700   Wound Width (cm) 0 cm 09/02/22 0700   Wound Depth (cm) 0 cm 09/02/22 0700   Wound Volume (cm^3) 0 cm^3 09/02/22 0700   Wound Surface Area (cm^2) 0 cm^2 09/02/22 0700   Care Cleansed with:;Antimicrobial agent;Sterile normal saline 09/02/22 0700   Dressing Changed 09/02/22 0700   Dressing Change Due 09/23/22 09/02/22 0700           Plan:             Wound has healed well with no signs of continued skin breakdown noted. Ok to convert to normal shoe gear, evaluated shoes patient plans to wear. Skin is still delicate therefore patient must be diligent in avoiding excessive pressure and making sure there is adequate support and padding in shoe gear.     Dsg on for week then inspect daily and paint with betadine.     Podiatry Appointment in 3 weeks for check. Was for 2 weeks but on vacation so will inspect feet daily.     Orders Placed This Encounter   Procedures    Change dressing     Benzoin and Iodosorb to left lateral and right medial with bordered gauze to left medial.        Follow up in about 3 weeks (around 9/23/2022).

## 2022-09-22 ENCOUNTER — TELEPHONE (OUTPATIENT)
Dept: VASCULAR SURGERY | Facility: CLINIC | Age: 57
End: 2022-09-22
Payer: COMMERCIAL

## 2022-09-22 NOTE — TELEPHONE ENCOUNTER
Contacted dialysis clinic to discuss this message about the pt was told per Ms. Nelly that pt has a functioning access at this time. States that she will verify message with her supervisor and return call to clinic.    ----- Message from Javad Encarnacion sent at 9/22/2022  9:15 AM CDT -----  Type:  Sooner Appointment Request    Patient is requesting a sooner appointment.  Patient declined first available appointment listed as well as another facility and provider .  Patient will not accept being placed on the waitlist and is requesting a message be sent to doctor.    Name of Caller: Dialysis clinic    When is the first available appointment? 11/17    Symptoms: needs new access line placed    Would the patient rather a call back or a response via [facility]? call    Best Call Back Number: [phone]

## 2022-09-23 ENCOUNTER — HOSPITAL ENCOUNTER (OUTPATIENT)
Dept: WOUND CARE | Facility: HOSPITAL | Age: 57
Discharge: HOME | End: 2022-09-23
Attending: PODIATRIST
Payer: COMMERCIAL

## 2022-09-23 VITALS
SYSTOLIC BLOOD PRESSURE: 182 MMHG | RESPIRATION RATE: 20 BRPM | TEMPERATURE: 98 F | DIASTOLIC BLOOD PRESSURE: 91 MMHG | HEART RATE: 96 BPM

## 2022-09-23 DIAGNOSIS — L97.522 FOOT ULCERATION, LEFT, WITH FAT LAYER EXPOSED: Primary | ICD-10-CM

## 2022-09-23 DIAGNOSIS — L97.512 FOOT ULCER, RIGHT, WITH FAT LAYER EXPOSED: ICD-10-CM

## 2022-09-23 DIAGNOSIS — E11.49 TYPE II DIABETES MELLITUS WITH NEUROLOGICAL MANIFESTATIONS: ICD-10-CM

## 2022-09-23 PROCEDURE — 99213 OFFICE O/P EST LOW 20 MIN: CPT | Performed by: PODIATRIST

## 2022-09-23 NOTE — PROGRESS NOTES
[facility]                Progress Note    Subjective:       Patient ID: Catalino Mcfadden is a 57 y.o. male.    Chief Complaint: Wound Check    F/u wound care visit. Patient ambulatory to exam room with no difficulty noted. Patient denies pain or discomfort at present. Wound dressing to all wounds intact with no drainage noted. Wound to right medial foot reopened. Wound care done per order. RTC in one week.    Review of Systems      Objective:        Physical Exam    Vitals:    09/23/22 0714   BP: (!) 182/91   Pulse: 96   Resp: 20   Temp: 98.2 °F (36.8 °C)       Assessment:           ICD-10-CM ICD-9-CM   1. Foot ulceration, left, with fat layer exposed  L97.522 707.15   2. Foot ulcer, right, with fat layer exposed  L97.512 707.15   3. Type II diabetes mellitus with neurological manifestations  E11.49 250.60            Wound 07/06/22 0920 Diabetic Ulcer Right medial Foot (Active)   07/06/22 0920    Pre-existing:    Primary Wound Type: Diabetic ulc   Side: Right   Orientation: medial   Location: Foot   Wound Number:    Ankle-Brachial Index:    Pulses:    Removal Indication and Assessment:    Wound Outcome:    (Retired) Wound Type:    (Retired) Wound Length (cm):    (Retired) Wound Width (cm):    (Retired) Depth (cm):    Wound Description (Comments):    Removal Indications:    Wound Image   09/23/22 0700   Wound WDL ex 09/23/22 0700   Dressing Appearance Intact;Dry 09/23/22 0700   Drainage Amount None 09/23/22 0700   Appearance Red 09/23/22 0700   Tissue loss description Partial thickness 09/23/22 0700   Black (%), Wound Tissue Color 0 % 09/23/22 0700   Red (%), Wound Tissue Color 100 % 09/23/22 0700   Yellow (%), Wound Tissue Color 0 % 09/23/22 0700   Periwound Area Dry;Intact 09/23/22 0700   Wound Edges Defined 09/23/22 0700   Wound Length (cm) 0.7 cm 09/23/22 0700   Wound Width (cm) 0.5 cm 09/23/22 0700   Wound Depth (cm) 0.1 cm 09/23/22 0700   Wound Volume (cm^3) 0.035 cm^3  09/23/22 0700   Wound Surface Area (cm^2) 0.35 cm^2 09/23/22 0700   Care Cleansed with:;Soap and water;Sterile normal saline 09/23/22 0700   Dressing Changed 09/23/22 0700   Periwound Care Skin barrier film applied 09/23/22 0700   Off Loading Football dressing;Off loading shoe 09/23/22 0700   Dressing Change Due 09/30/22 09/23/22 0700            Wound 07/06/22 0921 Diabetic Ulcer Left medial Foot (Active)   07/06/22 0921    Pre-existing:    Primary Wound Type: Diabetic ulc   Side: Left   Orientation: medial   Location: Foot   Wound Number:    Ankle-Brachial Index:    Pulses:    Removal Indication and Assessment:    Wound Outcome:    (Retired) Wound Type:    (Retired) Wound Length (cm):    (Retired) Wound Width (cm):    (Retired) Depth (cm):    Wound Description (Comments):    Removal Indications:    Wound Image   09/23/22 0700   Wound WDL ex 09/23/22 0700   Dressing Appearance Intact;Dry 09/23/22 0700   Drainage Amount None 09/23/22 0700   Appearance Red 09/23/22 0700   Tissue loss description Partial thickness 09/23/22 0700   Black (%), Wound Tissue Color 0 % 09/23/22 0700   Red (%), Wound Tissue Color 100 % 09/23/22 0700   Yellow (%), Wound Tissue Color 0 % 09/23/22 0700   Periwound Area Dry;Intact 09/23/22 0700   Wound Edges Defined 09/23/22 0700   Wound Length (cm) 0.1 cm 09/23/22 0700   Wound Width (cm) 0.1 cm 09/23/22 0700   Wound Depth (cm) 0.1 cm 09/23/22 0700   Wound Volume (cm^3) 0.001 cm^3 09/23/22 0700   Wound Surface Area (cm^2) 0.01 cm^2 09/23/22 0700   Care Cleansed with:;Soap and water;Sterile normal saline 09/23/22 0700   Dressing Changed 09/23/22 0700   Periwound Care Skin barrier film applied 09/23/22 0700            Wound 07/06/22 0921 Diabetic Ulcer Left lateral;plantar Foot (Active)   07/06/22 0921    Pre-existing:    Primary Wound Type: Diabetic ulc   Side: Left   Orientation: lateral;plantar   Location: Foot   Wound Number:    Ankle-Brachial Index:    Pulses:    Removal Indication and  Assessment:    Wound Outcome:    (Retired) Wound Type:    (Retired) Wound Length (cm):    (Retired) Wound Width (cm):    (Retired) Depth (cm):    Wound Description (Comments):    Removal Indications:    Wound Image   09/23/22 0700   Wound WDL WDL 09/23/22 0700   Dressing Appearance Dry;Intact 09/23/22 0700   Drainage Amount None 09/23/22 0700   Appearance Epithelialization 09/23/22 0700   Tissue loss description Not applicable 09/23/22 0700   Wound Edges Rolled/closed 09/23/22 0700   Wound Length (cm) 0 cm 09/23/22 0700   Wound Width (cm) 0 cm 09/23/22 0700   Wound Depth (cm) 0 cm 09/23/22 0700   Wound Volume (cm^3) 0 cm^3 09/23/22 0700   Wound Surface Area (cm^2) 0 cm^2 09/23/22 0700           Plan:                Orders Placed This Encounter   Procedures    Change dressing     Clean with NS. Left medial foot: Cavilon to periwound. Endoform/Iodosorb. Tegafoam. Right medial foot: Cavilon to periwound. U-shape pad. Endoform/iodosorb to wound bed. Eran foam long x2. Football dressing (cast padding x3, coban). Carol Ann.        Follow up in about 1 week (around 9/30/2022) for wound care visit.

## 2022-09-24 ENCOUNTER — HOSPITAL ENCOUNTER (EMERGENCY)
Facility: HOSPITAL | Age: 57
Discharge: HOME | End: 2022-09-24
Attending: EMERGENCY MEDICINE
Payer: COMMERCIAL

## 2022-09-24 VITALS
WEIGHT: 244.69 LBS | OXYGEN SATURATION: 100 % | HEART RATE: 77 BPM | DIASTOLIC BLOOD PRESSURE: 103 MMHG | BODY MASS INDEX: 32.43 KG/M2 | RESPIRATION RATE: 19 BRPM | TEMPERATURE: 99 F | HEIGHT: 73 IN | SYSTOLIC BLOOD PRESSURE: 185 MMHG

## 2022-09-24 DIAGNOSIS — R06.6 INTRACTABLE HICCUPS: Primary | ICD-10-CM

## 2022-09-24 DIAGNOSIS — R06.6 HICCUPS: ICD-10-CM

## 2022-09-24 DIAGNOSIS — N18.6 ESRD (END STAGE RENAL DISEASE): ICD-10-CM

## 2022-09-24 DIAGNOSIS — H65.92 OME (OTITIS MEDIA WITH EFFUSION), LEFT: ICD-10-CM

## 2022-09-24 LAB
ANION GAP SERPL CALC-SCNC: 14 MMOL/L (ref 8–16)
BUN SERPL-MCNC: 18 MG/DL (ref 6–30)
CHLORIDE SERPL-SCNC: 93 MMOL/L (ref 95–110)
CREAT SERPL-MCNC: 4 MG/DL (ref 0.5–1.4)
GLUCOSE SERPL-MCNC: 118 MG/DL (ref 70–110)
HCT VFR BLD CALC: 37 %PCV (ref 36–54)
POC IONIZED CALCIUM: 1.16 MMOL/L (ref 1.06–1.42)
POC TCO2 (MEASURED): 33 MMOL/L (ref 23–29)
POTASSIUM BLD-SCNC: 3.2 MMOL/L (ref 3.5–5.1)
SAMPLE: ABNORMAL
SODIUM BLD-SCNC: 136 MMOL/L (ref 136–145)

## 2022-09-24 PROCEDURE — 99900035 HC TECH TIME PER 15 MIN (STAT)

## 2022-09-24 PROCEDURE — 84295 ASSAY OF SERUM SODIUM: CPT

## 2022-09-24 PROCEDURE — 99284 EMERGENCY DEPT VISIT MOD MDM: CPT | Mod: 25

## 2022-09-24 PROCEDURE — 82565 ASSAY OF CREATININE: CPT

## 2022-09-24 PROCEDURE — 93005 ELECTROCARDIOGRAM TRACING: CPT

## 2022-09-24 PROCEDURE — 85014 HEMATOCRIT: CPT

## 2022-09-24 PROCEDURE — 25000003 PHARM REV CODE 250: Performed by: PHYSICIAN ASSISTANT

## 2022-09-24 PROCEDURE — 82330 ASSAY OF CALCIUM: CPT

## 2022-09-24 PROCEDURE — 93010 ELECTROCARDIOGRAM REPORT: CPT | Mod: ,,, | Performed by: INTERNAL MEDICINE

## 2022-09-24 PROCEDURE — 84132 ASSAY OF SERUM POTASSIUM: CPT

## 2022-09-24 PROCEDURE — 93010 EKG 12-LEAD: ICD-10-PCS | Mod: ,,, | Performed by: INTERNAL MEDICINE

## 2022-09-24 RX ORDER — LIDOCAINE HYDROCHLORIDE 20 MG/ML
15 SOLUTION OROPHARYNGEAL ONCE
Status: COMPLETED | OUTPATIENT
Start: 2022-09-24 | End: 2022-09-24

## 2022-09-24 RX ORDER — MAG HYDROX/ALUMINUM HYD/SIMETH 200-200-20
30 SUSPENSION, ORAL (FINAL DOSE FORM) ORAL ONCE
Status: COMPLETED | OUTPATIENT
Start: 2022-09-24 | End: 2022-09-24

## 2022-09-24 RX ORDER — OXYMETAZOLINE HCL 0.05 %
1 SPRAY, NON-AEROSOL (ML) NASAL 2 TIMES DAILY
Qty: 15 ML | Refills: 0 | Status: SHIPPED | OUTPATIENT
Start: 2022-09-24 | End: 2022-09-27

## 2022-09-24 RX ADMIN — ALUMINUM HYDROXIDE, MAGNESIUM HYDROXIDE, AND DIMETHICONE 30 ML: 200; 20; 200 SUSPENSION ORAL at 01:09

## 2022-09-24 RX ADMIN — LIDOCAINE HYDROCHLORIDE 15 ML: 20 SOLUTION ORAL at 01:09

## 2022-09-24 NOTE — ED TRIAGE NOTES
Pt. Complains of hiccups that started 1 week ago. Pt. States that the hiccups were so bad that it caused him to vomit twice.

## 2022-09-24 NOTE — DISCHARGE INSTRUCTIONS

## 2022-09-24 NOTE — ED PROVIDER NOTES
Encounter Date: 9/24/2022    SCRIBE #1 NOTE: I, BUDDY VELASQUEZ, am scribing for, and in the presence of,  Tenzin Sarmiento PA-C. I have scribed the following portions of the note - Other sections scribed: HPI, ROS.     History     Chief Complaint   Patient presents with    Hiccups     Pt c/o hiccups x 1 week. States his hiccups were so bad yesterday causing him to have two episodes of vomitus. Pt is on dialysis, Tuesday, Thursdays, and Saturday, last treatment was this morning. Denies cp, sob, n/v/d.     57-year-old male patient with a past medical history of CKD, DM, HLD, and HTN, presents to the ED with a chief complaint of hiccups for one week. Patient states that he has been having constant hiccupping that causes him to have emesis. He reports that he is currently on dialysis treatment. Denies a PMHx of acid reflux. No other exacerbating or alleviating factors.     The history is provided by the patient. No  was used.   Review of patient's allergies indicates:  No Known Allergies  Past Medical History:   Diagnosis Date    CKD (chronic kidney disease), stage III 07/16/2020    CKD stage 3 due to type 1 diabetes mellitus     CKD stage 3 due to type 2 diabetes mellitus     Diabetes mellitus, type 2     Diabetic foot ulcer associated with diabetes mellitus due to underlying condition 07/16/2020    Diabetic foot ulcers     Edema 08/03/2020    generalized, including genital area    Hyperlipidemia     Hypertension     Obese      Past Surgical History:   Procedure Laterality Date    BONE BIOPSY Left 7/17/2020    Procedure: BIOPSY, BONE;  Surgeon: Verona Whitmore DPM;  Location: [facility] OR;  Service: Podiatry;  Laterality: Left;    CERVICAL DISC SURGERY  07/11/2016    DEBRIDEMENT Left 7/17/2020    Procedure: DEBRIDEMENT;  Surgeon: Verona Whitmore DPM;  Location: [facility] OR;  Service: Podiatry;  Laterality: Left;    DEBRIDEMENT OF FOOT Right     toes & plantar surface    FRACTURE SURGERY Right     medial  ankle, metal plate present    INSERTION OF TUNNELED CENTRAL VENOUS CATHETER (CVC) WITH SUBCUTANEOUS PORT N/A 6/11/2021    Procedure: TUNNEL CATH INSERTION WITHOUT PORT;  Surgeon: Jose Manuel Diagnostic Provider;  Location: [facility] OR;  Service: Radiology;  Laterality: N/A;  11AM START---PHONE PREOP 6/10/21---COVID POSTIVE ON 7/2020---NO S/S    left leg orthopedic surgery Left      Family History   Problem Relation Age of Onset    Heart disease Father      Social History     Tobacco Use    Smoking status: Never    Smokeless tobacco: Never   Substance Use Topics    Alcohol use: Yes     Comment: social    Drug use: No     Review of Systems   Constitutional:  Negative for fever.   HENT:  Negative for congestion, sore throat and trouble swallowing.         Positive for hiccups.     Respiratory:  Negative for cough and shortness of breath.    Cardiovascular:  Negative for chest pain.   Gastrointestinal:  Positive for vomiting. Negative for abdominal pain, constipation, diarrhea and nausea.   Genitourinary:  Negative for dysuria, flank pain, frequency and urgency.   Musculoskeletal:  Negative for back pain.   Skin:  Negative for rash.   Neurological:  Negative for headaches.   All other systems reviewed and are negative.    Physical Exam     Initial Vitals [09/24/22 1324]   BP Pulse Resp Temp SpO2   (!) 144/95 80 16 98.7 °F (37.1 °C) 97 %      MAP       --         Physical Exam    Nursing note and vitals reviewed.  Constitutional: He appears well-developed and well-nourished. He is not diaphoretic. No distress.   HENT:   Head: Atraumatic.   Right Ear: External ear normal.   Left Ear: External ear normal.   Small non purulent effusion to the left TM.   Eyes: Conjunctivae are normal.   Neck: No tracheal deviation present.   Normal range of motion.  Cardiovascular:  Normal rate and regular rhythm.           Pulmonary/Chest: No accessory muscle usage or stridor. No tachypnea. No respiratory distress.   Abdominal: Abdomen is soft. He  exhibits no distension. There is no abdominal tenderness.   No right CVA tenderness.  No left CVA tenderness. There is no rebound, no guarding, no tenderness at McBurney's point and negative Logan's sign. negative Rovsing's sign  Musculoskeletal:      Cervical back: Normal range of motion.     Neurological: He has normal strength. He displays no tremor. He displays no seizure activity. Coordination and gait normal.   Skin: Skin is intact. Capillary refill takes less than 2 seconds. No cyanosis.       ED Course   Procedures  Labs Reviewed   ISTAT PROCEDURE - Abnormal; Notable for the following components:       Result Value    POC Glucose 118 (*)     POC Creatinine 4.0 (*)     POC Potassium 3.2 (*)     POC Chloride 93 (*)     POC TCO2 (MEASURED) 33 (*)     All other components within normal limits          Imaging Results              X-Ray Chest PA And Lateral (Final result)  Result time 09/24/22 14:38:47      Final result by Dominique Monzon MD (09/24/22 14:38:47)                   Impression:      As above.      Electronically signed by: Dominique Monzon MD  Date:    09/24/2022  Time:    14:38               Narrative:    EXAMINATION:  XR CHEST PA AND LATERAL    CLINICAL HISTORY:  Hiccough    TECHNIQUE:  PA and lateral views of the chest were performed.    COMPARISON:  07/27/2021    FINDINGS:  The heart is at the upper limits of normal in size.  There is central pulmonary vascular congestion with mild interstitial edema.  Right-sided central venous catheter has its tip in the region of the distal SVC/right atrium.  Skeletal structures are intact.                                       Medications   aluminum-magnesium hydroxide-simethicone 200-200-20 mg/5 mL suspension 30 mL (30 mLs Oral Given 9/24/22 1354)     And   LIDOcaine HCl 2% oral solution 15 mL (15 mLs Oral Given 9/24/22 1354)     Medical Decision Making:   History:   Old Medical Records: I decided to obtain old medical records.  Independently Interpreted  Test(s):   I have ordered and independently interpreted EKG Reading(s) - see prior notes  Clinical Tests:   Lab Tests: Ordered and Reviewed  Radiological Study: Ordered and Reviewed  Medical Tests: Ordered and Reviewed  ED Management:  Hiccups x1 week.  Small non purulent effusion on the left could be contributing to symptoms.  No hyponatremia or hypocalcemia.  No structural abnormalities on chest x-ray.  Small amount of edema consistent with his baseline.  No STEMI.  No great medication options for patient given his medical history.  Physical maneuvers attempted in the ED without relief.  Will refer to GI for further investigation and management as an outpatient.  Advising follow-up with PCP. Strict return precautions discussed.  Agreeable to plan.        Scribe Attestation:   Scribe #1: I performed the above scribed service and the documentation accurately describes the services I performed. I attest to the accuracy of the note.                   Clinical Impression:   Final diagnoses:  [N18.6] ESRD (end stage renal disease)  [R06.6] Hiccups  [R06.6] Intractable hiccups (Primary)  [H65.92] OME (otitis media with effusion), left     Scribe attestation: I, Tenzin Sarmiento PA-C, personally performed the services described in this documentation. All medical record entries made by the scribe were at my direction and in my presence.  I have reviewed the chart and agree that the record reflects my personal performance and is accurate and complete.    ED Disposition Condition    Discharge Stable          ED Prescriptions       Medication Sig Dispense Start Date End Date Auth. Provider    oxymetazoline (AFRIN) 0.05 % nasal spray 1 spray by Nasal route 2 (two) times daily. DO NOT EXCEED USE FOR MORE THAN 3 DAYS IN A ROW. for 3 days 15 mL 9/24/2022 9/27/2022 Tenzin Sarmiento PA-C          Follow-up Information       Follow up With Specialties Details Why Contact Info    Azikiwe K. Lombard, MD Family Medicine Schedule an  appointment as soon as possible for a visit in 1 day For re-evaluation [address] [facility] [address]  [phone]      [facility] [place] Emergency [facility] Emergency Medicine Go to  If symptoms worsen [address]  [facility] [address] [phone]             Tenzin Sarmiento PA-C  09/24/22 3495

## 2022-09-24 NOTE — ED TRIAGE NOTES
Pt c/o hiccups x 1 week. States his hiccups were so bad yesterday causing him to have two episodes of vomiting. Pt is on dialysis, Tuesday, Thursdays, and Saturday, last treatment was this morning. Denies cp, sob, n/v/d.

## 2022-09-26 ENCOUNTER — PATIENT OUTREACH (OUTPATIENT)
Dept: ADMINISTRATIVE | Facility: OTHER | Age: 57
End: 2022-09-26
Payer: COMMERCIAL

## 2022-09-26 NOTE — PROGRESS NOTES
CHW - Initial Contact    This Community Health Worker completed the Social Determinant of Health questionnaire with patient in clinic today.    Pt identified barriers of most importance are: pt navigation  Referrals to community agencies completed with patient consent outside of [facility] include: pharmacy  Referrals were put through [facility] - no  Support and Services: n/a  Other information discussed the patient needs / wants help with: communicating with provider in regards to health complication  Follow up required: yes  Follow-up Outreach - Due: 10/10/2022    Pt came in clinic because he's been experiencing gastro issues; Has an appt w/ specialist but his symptoms have worsened; spoke with provider about a few alternatives for pt's conditions; gave pt info on urgent care if he feels needs to be seen earlier

## 2022-09-27 RX ORDER — PANTOPRAZOLE SODIUM 40 MG/1
40 TABLET, DELAYED RELEASE ORAL DAILY
Qty: 7 TABLET | Refills: 0 | Status: SHIPPED | OUTPATIENT
Start: 2022-09-27 | End: 2022-10-04

## 2022-09-29 ENCOUNTER — OFFICE VISIT (OUTPATIENT)
Dept: GASTROENTEROLOGY | Facility: CLINIC | Age: 57
End: 2022-09-29
Payer: COMMERCIAL

## 2022-09-29 VITALS
OXYGEN SATURATION: 98 % | HEART RATE: 73 BPM | WEIGHT: 250.31 LBS | BODY MASS INDEX: 33.17 KG/M2 | HEIGHT: 73 IN | SYSTOLIC BLOOD PRESSURE: 189 MMHG | DIASTOLIC BLOOD PRESSURE: 89 MMHG

## 2022-09-29 DIAGNOSIS — R06.6 INTRACTABLE HICCUPS: Primary | ICD-10-CM

## 2022-09-29 PROCEDURE — 3044F PR MOST RECENT HEMOGLOBIN A1C LEVEL <7.0%: ICD-10-PCS | Mod: CPTII,S$GLB,, | Performed by: INTERNAL MEDICINE

## 2022-09-29 PROCEDURE — 1159F PR MEDICATION LIST DOCUMENTED IN MEDICAL RECORD: ICD-10-PCS | Mod: CPTII,S$GLB,, | Performed by: INTERNAL MEDICINE

## 2022-09-29 PROCEDURE — 99204 OFFICE O/P NEW MOD 45 MIN: CPT | Mod: S$GLB,,, | Performed by: INTERNAL MEDICINE

## 2022-09-29 PROCEDURE — 3079F DIAST BP 80-89 MM HG: CPT | Mod: CPTII,S$GLB,, | Performed by: INTERNAL MEDICINE

## 2022-09-29 PROCEDURE — 99999 PR PBB SHADOW E&M-EST. PATIENT-LVL IV: CPT | Mod: PBBFAC,,, | Performed by: INTERNAL MEDICINE

## 2022-09-29 PROCEDURE — 3008F BODY MASS INDEX DOCD: CPT | Mod: CPTII,S$GLB,, | Performed by: INTERNAL MEDICINE

## 2022-09-29 PROCEDURE — 1159F MED LIST DOCD IN RCRD: CPT | Mod: CPTII,S$GLB,, | Performed by: INTERNAL MEDICINE

## 2022-09-29 PROCEDURE — 3077F SYST BP >= 140 MM HG: CPT | Mod: CPTII,S$GLB,, | Performed by: INTERNAL MEDICINE

## 2022-09-29 PROCEDURE — 1160F RVW MEDS BY RX/DR IN RCRD: CPT | Mod: CPTII,S$GLB,, | Performed by: INTERNAL MEDICINE

## 2022-09-29 PROCEDURE — 3079F PR MOST RECENT DIASTOLIC BLOOD PRESSURE 80-89 MM HG: ICD-10-PCS | Mod: CPTII,S$GLB,, | Performed by: INTERNAL MEDICINE

## 2022-09-29 PROCEDURE — 3077F PR MOST RECENT SYSTOLIC BLOOD PRESSURE >= 140 MM HG: ICD-10-PCS | Mod: CPTII,S$GLB,, | Performed by: INTERNAL MEDICINE

## 2022-09-29 PROCEDURE — 3008F PR BODY MASS INDEX (BMI) DOCUMENTED: ICD-10-PCS | Mod: CPTII,S$GLB,, | Performed by: INTERNAL MEDICINE

## 2022-09-29 PROCEDURE — 3044F HG A1C LEVEL LT 7.0%: CPT | Mod: CPTII,S$GLB,, | Performed by: INTERNAL MEDICINE

## 2022-09-29 PROCEDURE — 1160F PR REVIEW ALL MEDS BY PRESCRIBER/CLIN PHARMACIST DOCUMENTED: ICD-10-PCS | Mod: CPTII,S$GLB,, | Performed by: INTERNAL MEDICINE

## 2022-09-29 PROCEDURE — 99999 PR PBB SHADOW E&M-EST. PATIENT-LVL IV: ICD-10-PCS | Mod: PBBFAC,,, | Performed by: INTERNAL MEDICINE

## 2022-09-29 PROCEDURE — 99204 PR OFFICE/OUTPT VISIT, NEW, LEVL IV, 45-59 MIN: ICD-10-PCS | Mod: S$GLB,,, | Performed by: INTERNAL MEDICINE

## 2022-09-29 RX ORDER — PANTOPRAZOLE SODIUM 40 MG/1
40 TABLET, DELAYED RELEASE ORAL 2 TIMES DAILY
Qty: 60 TABLET | Refills: 12 | Status: SHIPPED | OUTPATIENT
Start: 2022-09-29 | End: 2023-01-23

## 2022-09-29 RX ORDER — BACLOFEN 10 MG/1
10 TABLET ORAL 3 TIMES DAILY
Qty: 90 TABLET | Refills: 11 | Status: SHIPPED | OUTPATIENT
Start: 2022-09-29 | End: 2022-10-11 | Stop reason: SINTOL

## 2022-09-29 NOTE — PROGRESS NOTES
Subjective:       Patient ID: Catalino Mcfadden is a 57 y.o. male.    Chief Complaint: No chief complaint on file.    HPI      57-year-old gentleman seen as a new patient consult.  He has a history of diabetes, chronic renal failure, dialysis.  His chief complaint now is that of chronic hiccups.  This is been present for 1 week.  He has never had prolonged hiccups in the past.  It has now been persistent for 1 week without any break.  It is associated with belching at the same time.  He has been in the emergency room once.  Chest x-ray was normal.  BMP was what you would expect with chronic renal failure he was discharged on pantoprazole once a day but he did not get that filled.    As noted he has been having constant hiccups and belching for 7 days.  He also complains of solid food dysphagia which has been present before this started.  Any has a globus sensation.  He denies any pyrosis or regurgitation has no prior early satiety.  No abdominal pain.  He has occasional urgent bowel movement after eating certain foods.      Past Medical History:   Diagnosis Date    CKD (chronic kidney disease), stage III 07/16/2020    CKD stage 3 due to type 1 diabetes mellitus     CKD stage 3 due to type 2 diabetes mellitus     Diabetes mellitus, type 2     Diabetic foot ulcer associated with diabetes mellitus due to underlying condition 07/16/2020    Diabetic foot ulcers     Edema 08/03/2020    generalized, including genital area    Hyperlipidemia     Hypertension     Obese        Review of patient's allergies indicates:  No Known Allergies     Family History   Problem Relation Age of Onset    Heart disease Father     Colon cancer Neg Hx     Esophageal cancer Neg Hx        Social History     Tobacco Use    Smoking status: Never    Smokeless tobacco: Never   Substance Use Topics    Alcohol use: Yes     Comment: social    Drug use: No        Review of Systems   Constitutional:  Negative for activity change, appetite change, chills,  diaphoresis, fatigue, fever and unexpected weight change.   HENT:  Negative for nasal congestion, ear pain, mouth sores, rhinorrhea, sinus pressure/congestion, sneezing, sore throat, trouble swallowing and voice change.    Eyes:  Negative for pain.   Respiratory:  Negative for cough and shortness of breath.    Cardiovascular:  Negative for chest pain, palpitations and leg swelling.   Genitourinary:  Negative for difficulty urinating and flank pain.   Musculoskeletal:  Negative for arthralgias, back pain, gait problem, joint swelling, myalgias and neck pain.   Integumentary:  Negative for rash.   Neurological:  Negative for dizziness, tremors, syncope, numbness and headaches.   Hematological:  Negative for adenopathy. Does not bruise/bleed easily.   Psychiatric/Behavioral:  Negative for agitation, behavioral problems, confusion, decreased concentration and dysphoric mood.      BMP: No results found for: GLU, NA, K, CL, CO2, BUN, CREATININE, CALCIUM, MG    No results found for this or any previous visit from the past 365 days.             Objective:      Physical Exam  Constitutional:       General: He is not in acute distress.     Appearance: Normal appearance. He is well-developed. He is not diaphoretic.   HENT:      Right Ear: External ear normal.      Left Ear: External ear normal.      Nose: Nose normal.      Mouth/Throat:      Pharynx: No oropharyngeal exudate.   Eyes:      General: No scleral icterus.     Conjunctiva/sclera: Conjunctivae normal.      Pupils: Pupils are equal, round, and reactive to light.   Neck:      Thyroid: No thyromegaly.   Cardiovascular:      Rate and Rhythm: Normal rate.      Heart sounds: Normal heart sounds. No murmur heard.    No gallop.   Pulmonary:      Effort: Pulmonary effort is normal.      Breath sounds: Normal breath sounds. No wheezing.   Abdominal:      General: Bowel sounds are normal. There is no distension.      Palpations: Abdomen is soft. Abdomen is not rigid. There is  no fluid wave or mass.      Tenderness: There is no abdominal tenderness. There is no guarding or rebound.   Genitourinary:     Comments: recent  Musculoskeletal:         General: No tenderness. Normal range of motion.      Cervical back: Normal range of motion and neck supple.   Lymphadenopathy:      Cervical: No cervical adenopathy.   Skin:     General: Skin is warm.      Findings: No rash.   Neurological:      Mental Status: He is alert and oriented to person, place, and time.      Cranial Nerves: No cranial nerve deficit.      Deep Tendon Reflexes: Reflexes are normal and symmetric.   Psychiatric:         Behavior: Behavior normal.         Thought Content: Thought content normal.       Assessment & Plan:       Intractable hiccups    Other orders  -     pantoprazole (PROTONIX) 40 MG tablet; Take 1 tablet (40 mg total) by mouth 2 (two) times daily.  Dispense: 60 tablet; Refill: 12  -     baclofen (LIORESAL) 10 MG tablet; Take 1 tablet (10 mg total) by mouth 3 (three) times daily.  Dispense: 90 tablet; Refill: 11     Assessment.    One intractable hiccups.  Patient is clearly distressed by the persistent symptoms.  I will start him on pantoprazole twice a day and baclofen 3 times a day.  I will set him up for an EGD done urgently on the [facility].  If the baclofen is unhelpful will consider trial of gabapentin.  We will try to message his nephrologist      This note was created with voice recognition dictation technology.  There may be errors that I did not see, detect or correct.      Jarett Hill MD

## 2022-09-29 NOTE — Clinical Note
Saw this patient in the office today.  He is absolutely miserable with hiccups for the past week.  I want to start baclofen right now an order an urgent EGD.  An alternative to baclofen could be gabapentin.  With his renal failure I wanted to check with you 1st.

## 2022-09-30 ENCOUNTER — HOSPITAL ENCOUNTER (OUTPATIENT)
Dept: WOUND CARE | Facility: HOSPITAL | Age: 57
Discharge: HOME | End: 2022-09-30
Attending: PODIATRIST
Payer: COMMERCIAL

## 2022-09-30 VITALS
TEMPERATURE: 98 F | SYSTOLIC BLOOD PRESSURE: 180 MMHG | DIASTOLIC BLOOD PRESSURE: 110 MMHG | RESPIRATION RATE: 16 BRPM | HEART RATE: 68 BPM

## 2022-09-30 DIAGNOSIS — L97.512 FOOT ULCER, RIGHT, WITH FAT LAYER EXPOSED: Primary | ICD-10-CM

## 2022-09-30 DIAGNOSIS — L97.522 FOOT ULCERATION, LEFT, WITH FAT LAYER EXPOSED: ICD-10-CM

## 2022-09-30 DIAGNOSIS — E11.49 TYPE II DIABETES MELLITUS WITH NEUROLOGICAL MANIFESTATIONS: ICD-10-CM

## 2022-09-30 PROCEDURE — 99213 OFFICE O/P EST LOW 20 MIN: CPT | Performed by: PODIATRIST

## 2022-09-30 PROCEDURE — 99213 PR OFFICE/OUTPT VISIT, EST, LEVL III, 20-29 MIN: ICD-10-PCS | Mod: ,,, | Performed by: PODIATRIST

## 2022-09-30 PROCEDURE — 99213 OFFICE O/P EST LOW 20 MIN: CPT | Mod: ,,, | Performed by: PODIATRIST

## 2022-09-30 NOTE — PROGRESS NOTES
"[facility]                Progress Note    Subjective:       Patient ID: Catalino Mcfadden is a 57 y.o. male.    Chief Complaint: Wound Check    F/u wound care visit. Patient ambulatory to exam room with darco on right foot as ordered. Patient is walking a little "wobbly", states he started on new meds (Baclefen) for hiccups last night and it's making him "feel off balance". BP elevate 228/120 automatic, 218/110 manually), patient states he did not take BP meds in two days. Denies h/a or blurred vision but thinks dizziness if from new meds. MD informed and instructed patient that he need to be evaluated in ED, patient refused. Instructed patient if dizziness worsens, start to have blurred vision, h/a or chest pain to call 911 or go to ED immediately, patient verbalized understanding. Wound dressing to right foot intact with scant amount of drainage noted. Wound to right medial foot improving, measuring smaller in all dimensions. Wound dressings to left medial and lateral foot intact with no drainage noted, wounds to those areas are healed. Wound care done per order. RTC on 10/7/22.    Review of Systems   Constitutional:  Negative for activity change, appetite change and fever.   HENT:  Negative for congestion.    Respiratory:  Negative for shortness of breath and wheezing.    Cardiovascular:  Positive for leg swelling. Negative for chest pain.   Gastrointestinal:  Negative for abdominal pain, nausea and vomiting.   Musculoskeletal:  Positive for myalgias.   Skin:  Positive for wound.   Neurological:  Negative for dizziness and headaches.   Psychiatric/Behavioral:  The patient is not nervous/anxious.        Objective:        Physical Exam  Vitals and nursing note reviewed.   Constitutional:       General: He is not in acute distress.     Appearance: He is not toxic-appearing or diaphoretic.      Comments: Pt. is well-developed, well-nourished, appears stated age, in no acute " distress, alert and oriented x 3. No evidence of depression, anxiety, or agitation. Calm, cooperative, and communicative. Appropriate interactions and affect.   Cardiovascular:      Pulses:           Dorsalis pedis pulses are 2+ on the right side and 2+ on the left side.        Posterior tibial pulses are 1+ on the right side and 1+ on the left side.      Comments: There is decreased digital hair. Skin is atrophic, slightly hyperpigmented  Pulmonary:      Effort: No respiratory distress.   Musculoskeletal:      Right ankle: No swelling. No tenderness. No lateral malleolus, medial malleolus, AITF ligament, CF ligament or posterior TF ligament tenderness. Normal range of motion.      Right Achilles Tendon: No defects. Hilliard's test negative.      Left ankle: No swelling. No tenderness. No lateral malleolus, medial malleolus, AITF ligament, CF ligament or posterior TF ligament tenderness. Normal range of motion.      Left Achilles Tendon: No defects. Hilliard's test negative.      Right foot: No tenderness or bony tenderness.      Left foot: No tenderness or bony tenderness.      Comments: Decreased stride, station of gait.  apropulsive toe off.  Increased angle and base of gait.    There is equinus deformity bilateral with decreased dorsiflexion at the ankle joint bilateral. No tenderness with compression of heel. Negative tinels sign. Gait analysis reveals excessive pronation through midstance and propulsion with early heel off.     Patient has hammertoes of digits 2-5 bilateral partially reducible     Decreased first MPJ range of motion both weightbearing and nonweightbearing, no crepitus observed the first MP joint, + dorsal flag sign.     Visible and palpable bunion without pain at dorsomedial 1st metatarsal head right and left.  Hallux abducted right and left partially reducible, tracks laterally without being track bound.  No ecchymosis, erythema, edema, or cardinal signs infection or signs of trauma same  foot.    Fat pad atrophy to heels and met heads bilateral    Plantarflexed 1st ray RLE   Lymphadenopathy:      Comments: No lymphatic streaking    Negative lymphadenopathy bilateral popliteal fossa and tarsal tunnel.     Skin:     General: Skin is warm and dry.      Coloration: Skin is not pale.      Findings: Lesion (see wound descriptions below) present. No abrasion, ecchymosis, laceration or rash.      Nails: There is no clubbing.      Comments: Toenails 1-5 bilaterally discolored/yellowed, dystrophic, brittle with subungual debris.    Neurological:      Sensory: Sensory deficit present.      Comments:  Bonneau-Dayton 5.07 monofilamant testing is diminished Kp feet. Decreased/absent vibratory sensation bilateral feet to 128Hz tuning fork.     Paresthesias, and hyperesthesia bilateral feet with no clearly identified trigger or source.           Psychiatric:         Attention and Perception: He is attentive.         Mood and Affect: Mood is not anxious. Affect is not inappropriate.         Speech: He is communicative. Speech is not slurred.         Behavior: Behavior is not combative.       Vitals:    09/30/22 0744   BP: (!) 180/110   Pulse:    Resp:    Temp:        Assessment:           ICD-10-CM ICD-9-CM   1. Foot ulcer, right, with fat layer exposed  L97.512 707.15   2. Type II diabetes mellitus with neurological manifestations  E11.49 250.60   3. Foot ulceration, left, with fat layer exposed  L97.522 707.15       [REMOVED]      (Retired) Wound 07/06/22 0920 Diabetic Ulcer Right medial Foot (Removed)   07/06/22 0920    Pre-existing:    Primary Wound Type: Diabetic ulc   Side: Right   Orientation: medial   Location: Foot   Wound Number:    Ankle-Brachial Index:    Pulses:    Removal Indication and Assessment:    Wound Outcome: Healed   (Retired) Wound Type:    (Retired) Wound Length (cm):    (Retired) Wound Width (cm):    (Retired) Depth (cm):    Wound Description (Comments):    Removal Indications:    Removed  10/07/22 0838   Wound Image   09/30/22 0745   Wound WDL ex 09/30/22 0745   Dressing Appearance Intact;Dried drainage 09/30/22 0745   Drainage Amount Scant 09/30/22 0745   Drainage Characteristics/Odor Serosanguineous 09/30/22 0745   Appearance Pink;Epithelialization 09/30/22 0745   Tissue loss description Partial thickness 09/30/22 0745   Black (%), Wound Tissue Color 0 % 09/30/22 0745   Red (%), Wound Tissue Color 100 % 09/30/22 0745   Yellow (%), Wound Tissue Color 0 % 09/30/22 0745   Periwound Area Dry;Intact 09/30/22 0745   Wound Edges Defined 09/30/22 0745   Wound Length (cm) 0.1 cm 09/30/22 0745   Wound Width (cm) 0.1 cm 09/30/22 0745   Wound Depth (cm) 0.1 cm 09/30/22 0745   Wound Volume (cm^3) 0.001 cm^3 09/30/22 0745   Wound Surface Area (cm^2) 0.01 cm^2 09/30/22 0745   Care Cleansed with:;Soap and water;Sterile normal saline 09/30/22 0745   Dressing Changed 09/30/22 0745   Compression Tubular elasticized bandage 09/30/22 0745   Off Loading Football dressing;Off loading shoe 09/30/22 0745   Dressing Change Due 10/07/22 09/30/22 0745       [REMOVED]      (Retired) Wound 07/06/22 0921 Diabetic Ulcer Left medial Foot (Removed)   07/06/22 0921    Pre-existing:    Primary Wound Type: Diabetic ulc   Side: Left   Orientation: medial   Location: Foot   Wound Number:    Ankle-Brachial Index:    Pulses:    Removal Indication and Assessment:    Wound Outcome: Healed   (Retired) Wound Type:    (Retired) Wound Length (cm):    (Retired) Wound Width (cm):    (Retired) Depth (cm):    Wound Description (Comments):    Removal Indications:    Removed 10/07/22 0840   Wound Image   09/30/22 0745   Wound WDL WDL 09/30/22 0745   Dressing Appearance Intact;Dry 09/30/22 0745   Drainage Amount None 09/30/22 0745   Appearance Epithelialization 09/30/22 0745   Tissue loss description Not applicable 09/30/22 0745   Periwound Area Dry;Intact 09/30/22 0745   Wound Edges Rolled/closed 09/30/22 0745   Wound Length (cm) 0 cm 09/30/22 0745    Wound Width (cm) 0 cm 09/30/22 0745   Wound Depth (cm) 0 cm 09/30/22 0745   Wound Volume (cm^3) 0 cm^3 09/30/22 0745   Wound Surface Area (cm^2) 0 cm^2 09/30/22 0745   Care Cleansed with:;Soap and water;Sterile normal saline 09/30/22 0745       [REMOVED]      (Retired) Wound 07/06/22 0921 Diabetic Ulcer Left lateral;plantar Foot (Removed)   07/06/22 0921    Pre-existing:    Primary Wound Type: Diabetic ulc   Side: Left   Orientation: lateral;plantar   Location: Foot   Wound Number:    Ankle-Brachial Index:    Pulses:    Removal Indication and Assessment:    Wound Outcome: Healed   (Retired) Wound Type:    (Retired) Wound Length (cm):    (Retired) Wound Width (cm):    (Retired) Depth (cm):    Wound Description (Comments):    Removal Indications:    Removed 10/07/22 0836   Wound Image   09/30/22 0745   Wound WDL WDL 09/30/22 0745   Dressing Appearance Dry;Intact 09/30/22 0745   Drainage Amount None 09/30/22 0745   Appearance Epithelialization 09/30/22 0745   Tissue loss description Not applicable 09/30/22 0745   Periwound Area Dry;Intact 09/30/22 0745   Wound Edges Rolled/closed 09/30/22 0745   Wound Length (cm) 0 cm 09/30/22 0745   Wound Width (cm) 0 cm 09/30/22 0745   Wound Depth (cm) 0 cm 09/30/22 0745   Wound Volume (cm^3) 0 cm^3 09/30/22 0745   Wound Surface Area (cm^2) 0 cm^2 09/30/22 0745   Care Cleansed with:;Soap and water;Sterile normal saline 09/30/22 0745           Plan:            Wounds have virtually healed but he is vulnerable for reopening given skin condition and multiple comorbidities.     Orders Placed This Encounter   Procedures    Change dressing     Clean with NS. Right medial foot: Endoform/iodoflex to wound bed. Eran foam long x2, secure with medipore tape. Football dressing (cast padding x3, coban). Tubigrip size E. Darco.        Follow up in about 1 week (around 10/7/2022) for wound care visit.

## 2022-10-05 ENCOUNTER — PATIENT OUTREACH (OUTPATIENT)
Dept: ADMINISTRATIVE | Facility: OTHER | Age: 57
End: 2022-10-05
Payer: COMMERCIAL

## 2022-10-05 NOTE — PROGRESS NOTES
CHW - Follow Up    This Community Health Worker completed a follow up visit with patient via telephone today.  Pt/Caregiver reported: was able to get an appt; obtained prescription to control hiccups; will follow up to see if this helped or if pt needs to be scheduled  Community Health Worker provided: n/a  Follow up required: yes  Follow-up Outreach - Due: 10/25/2022

## 2022-10-07 ENCOUNTER — HOSPITAL ENCOUNTER (OUTPATIENT)
Dept: WOUND CARE | Facility: HOSPITAL | Age: 57
Discharge: HOME | End: 2022-10-07
Attending: FAMILY MEDICINE
Payer: COMMERCIAL

## 2022-10-07 VITALS — HEART RATE: 80 BPM | DIASTOLIC BLOOD PRESSURE: 92 MMHG | SYSTOLIC BLOOD PRESSURE: 178 MMHG | TEMPERATURE: 97 F

## 2022-10-07 DIAGNOSIS — L97.512 FOOT ULCER, RIGHT, WITH FAT LAYER EXPOSED: ICD-10-CM

## 2022-10-07 DIAGNOSIS — L97.522 FOOT ULCERATION, LEFT, WITH FAT LAYER EXPOSED: Primary | ICD-10-CM

## 2022-10-07 PROCEDURE — 99214 PR OFFICE/OUTPT VISIT, EST, LEVL IV, 30-39 MIN: ICD-10-PCS | Mod: ,,, | Performed by: FAMILY MEDICINE

## 2022-10-07 PROCEDURE — 99214 OFFICE O/P EST MOD 30 MIN: CPT | Performed by: FAMILY MEDICINE

## 2022-10-07 PROCEDURE — 99214 OFFICE O/P EST MOD 30 MIN: CPT | Mod: ,,, | Performed by: FAMILY MEDICINE

## 2022-10-07 NOTE — PROGRESS NOTES
[facility]                Progress Note    Subjective:       Patient ID: Catalino Mcfadden is a 57 y.o. male.    Chief Complaint: Wound Check    Walked to clinic unaided wearing compression, football and Darco to R foot and compression to L. Wounds all healed will resume moisturize daily and inspection of feet. Follow up in clinic as wounds healed. Will call Dr Martinez office on Monday to make appointment.    MD note:  patient following up today for evaluation of bialteral DFU's.  The left foot wound was healed out previously, but he would like it evaluated again to make sure it is doing ok.  He states he has no feeling in his feet, so can't tell if there is any pain.  No fevers, chills, sweats.  He has been keeping dressings in place, clean, and dry.    Review of Systems   Constitutional: Negative.    HENT: Negative.     Eyes: Negative.    Respiratory: Negative.     Cardiovascular: Negative.    Gastrointestinal: Negative.    Musculoskeletal: Negative.    Skin:  Positive for wound.       Objective:        Physical Exam  Constitutional:       Appearance: Normal appearance.   HENT:      Head: Normocephalic and atraumatic.      Right Ear: External ear normal.      Left Ear: External ear normal.      Mouth/Throat:      Mouth: Mucous membranes are moist.      Pharynx: Oropharynx is clear.   Eyes:      Extraocular Movements: Extraocular movements intact.      Conjunctiva/sclera: Conjunctivae normal.      Pupils: Pupils are equal, round, and reactive to light.   Cardiovascular:      Rate and Rhythm: Normal rate and regular rhythm.   Pulmonary:      Effort: Pulmonary effort is normal. No respiratory distress.   Abdominal:      General: There is no distension.      Palpations: Abdomen is soft.   Skin:     General: Skin is warm and dry.      Comments: Wounds have healed   Neurological:      Mental Status: He is alert.       Vitals:    10/07/22 0842   BP: (!) 178/92   Pulse: 80   Temp:  97.3 °F (36.3 °C)       Assessment:           ICD-10-CM ICD-9-CM   1. Foot ulceration, left, with fat layer exposed  L97.522 707.15   2. Foot ulcer, right, with fat layer exposed  L97.512 707.15       [REMOVED]      (Retired) Wound 07/06/22 0920 Diabetic Ulcer Right medial Foot (Removed)   07/06/22 0920    Pre-existing:    Primary Wound Type: Diabetic ulc   Side: Right   Orientation: medial   Location: Foot   Wound Number:    Ankle-Brachial Index:    Pulses:    Removal Indication and Assessment:    Wound Outcome: Healed   (Retired) Wound Type:    (Retired) Wound Length (cm):    (Retired) Wound Width (cm):    (Retired) Depth (cm):    Wound Description (Comments):    Removal Indications:    Removed 10/07/22 0838   Wound Image   10/07/22 0835   Wound WDL WDL 10/07/22 0835   Dressing Appearance Dry;Intact 10/07/22 0835   Drainage Amount None 10/07/22 0835   Appearance Closed/resurfaced 10/07/22 0835   Wound Length (cm) 0 cm 10/07/22 0835   Wound Width (cm) 0 cm 10/07/22 0835   Wound Depth (cm) 0 cm 10/07/22 0835   Wound Volume (cm^3) 0 cm^3 10/07/22 0835   Wound Surface Area (cm^2) 0 cm^2 10/07/22 0835   Care Cleansed with:;Antimicrobial agent;Sterile normal saline 10/07/22 0835   Dressing Changed 10/07/22 0835       [REMOVED]      (Retired) Wound 07/06/22 0921 Diabetic Ulcer Left medial Foot (Removed)   07/06/22 0921    Pre-existing:    Primary Wound Type: Diabetic ulc   Side: Left   Orientation: medial   Location: Foot   Wound Number:    Ankle-Brachial Index:    Pulses:    Removal Indication and Assessment:    Wound Outcome: Healed   (Retired) Wound Type:    (Retired) Wound Length (cm):    (Retired) Wound Width (cm):    (Retired) Depth (cm):    Wound Description (Comments):    Removal Indications:    Removed 10/07/22 0840   Wound Image   10/07/22 0835   Wound WDL WDL 10/07/22 0835   Dressing Appearance Open to air 10/07/22 0835   Drainage Amount None 10/07/22 0835   Appearance Closed/resurfaced 10/07/22 0835   Wound  Length (cm) 0 cm 10/07/22 0835   Wound Width (cm) 0 cm 10/07/22 0835   Wound Depth (cm) 0 cm 10/07/22 0835   Wound Volume (cm^3) 0 cm^3 10/07/22 0835   Wound Surface Area (cm^2) 0 cm^2 10/07/22 0835   Care Cleansed with:;Antimicrobial agent;Sterile normal saline 10/07/22 0835   Dressing Changed 10/07/22 0835       [REMOVED]      (Retired) Wound 07/06/22 0921 Diabetic Ulcer Left lateral;plantar Foot (Removed)   07/06/22 0921    Pre-existing:    Primary Wound Type: Diabetic ulc   Side: Left   Orientation: lateral;plantar   Location: Foot   Wound Number:    Ankle-Brachial Index:    Pulses:    Removal Indication and Assessment:    Wound Outcome: Healed   (Retired) Wound Type:    (Retired) Wound Length (cm):    (Retired) Wound Width (cm):    (Retired) Depth (cm):    Wound Description (Comments):    Removal Indications:    Removed 10/07/22 0836   Wound Image   10/07/22 0835   Wound WDL WDL 10/07/22 0835   Dressing Appearance Open to air 10/07/22 0835   Drainage Amount None 10/07/22 0835   Appearance Closed/resurfaced 10/07/22 0835   Wound Length (cm) 0 cm 10/07/22 0835   Wound Width (cm) 0 cm 10/07/22 0835   Wound Depth (cm) 0 cm 10/07/22 0835   Wound Volume (cm^3) 0 cm^3 10/07/22 0835   Wound Surface Area (cm^2) 0 cm^2 10/07/22 0835   Care Cleansed with:;Antimicrobial agent;Sterile normal saline 10/07/22 0835   Dressing Changed 10/07/22 0835           Plan:                Orders Placed This Encounter   Procedures    Change dressing     Tegafoam over Benzoin to both feet medially.  Take off after week and inspect feet daily  Follow up with Dr petit to call Monday to make appointment.   Dressings placed to protect wound sites  Should wounds reopen he will need to return  Call with any concerns     Follow up if symptoms worsen or fail to improve.     Marty Carvajal MD

## 2022-10-11 ENCOUNTER — PATIENT MESSAGE (OUTPATIENT)
Dept: PODIATRY | Facility: CLINIC | Age: 57
End: 2022-10-11
Payer: COMMERCIAL

## 2022-10-11 ENCOUNTER — DOCUMENTATION ONLY (OUTPATIENT)
Dept: GASTROENTEROLOGY | Facility: HOSPITAL | Age: 57
End: 2022-10-11
Payer: COMMERCIAL

## 2022-10-11 RX ORDER — GABAPENTIN 100 MG/1
100 CAPSULE ORAL 3 TIMES DAILY
Qty: 90 CAPSULE | Refills: 11 | Status: ON HOLD | OUTPATIENT
Start: 2022-10-11 | End: 2023-12-12 | Stop reason: HOSPADM

## 2022-10-11 NOTE — PROGRESS NOTES
Phone call.  Had side effects from the baclofen include confusion, disorientation, and sleepiness  Will discontinue.  He continues with the constant belching and hiccups.    Will try gabapentin now.

## 2022-10-25 ENCOUNTER — CLINICAL SUPPORT (OUTPATIENT)
Dept: ENDOSCOPY | Facility: HOSPITAL | Age: 57
End: 2022-10-25
Attending: INTERNAL MEDICINE
Payer: COMMERCIAL

## 2022-10-25 DIAGNOSIS — R06.6 INTRACTABLE HICCUPS: ICD-10-CM

## 2022-10-25 NOTE — PLAN OF CARE
Contacted patient on 10/25/22 for scheduled phone appointment to schedule EGD. Patient stated that he does not have anyone available right now drive him home after procedure and would like a call back in a couple of days.

## 2022-10-26 ENCOUNTER — PATIENT OUTREACH (OUTPATIENT)
Dept: ADMINISTRATIVE | Facility: OTHER | Age: 57
End: 2022-10-26
Payer: COMMERCIAL

## 2022-10-26 ENCOUNTER — OFFICE VISIT (OUTPATIENT)
Dept: PODIATRY | Facility: CLINIC | Age: 57
End: 2022-10-26
Payer: COMMERCIAL

## 2022-10-26 VITALS — HEIGHT: 73 IN | BODY MASS INDEX: 33.19 KG/M2 | WEIGHT: 250.44 LBS

## 2022-10-26 DIAGNOSIS — E11.49 TYPE II DIABETES MELLITUS WITH NEUROLOGICAL MANIFESTATIONS: Primary | ICD-10-CM

## 2022-10-26 DIAGNOSIS — L84 PRE-ULCERATIVE CALLUSES: ICD-10-CM

## 2022-10-26 DIAGNOSIS — M20.5X2 HALLUX LIMITUS, ACQUIRED, LEFT: ICD-10-CM

## 2022-10-26 DIAGNOSIS — M20.41 HAMMER TOES OF BOTH FEET: ICD-10-CM

## 2022-10-26 DIAGNOSIS — Z86.31 HISTORY OF DIABETIC ULCER OF FOOT: ICD-10-CM

## 2022-10-26 DIAGNOSIS — M20.12 HALLUX ABDUCTO VALGUS, LEFT: ICD-10-CM

## 2022-10-26 DIAGNOSIS — M20.5X1 HALLUX LIMITUS, ACQUIRED, RIGHT: ICD-10-CM

## 2022-10-26 DIAGNOSIS — M21.6X1 PLANTARFLEXION DEFORMITY OF RIGHT FOOT: ICD-10-CM

## 2022-10-26 DIAGNOSIS — M20.11 HALLUX ABDUCTO VALGUS, RIGHT: ICD-10-CM

## 2022-10-26 DIAGNOSIS — M20.42 HAMMER TOES OF BOTH FEET: ICD-10-CM

## 2022-10-26 DIAGNOSIS — L90.9 PLANTAR FAT PAD ATROPHY: ICD-10-CM

## 2022-10-26 PROCEDURE — 99213 PR OFFICE/OUTPT VISIT, EST, LEVL III, 20-29 MIN: ICD-10-PCS | Mod: S$GLB,,, | Performed by: PODIATRIST

## 2022-10-26 PROCEDURE — 1160F PR REVIEW ALL MEDS BY PRESCRIBER/CLIN PHARMACIST DOCUMENTED: ICD-10-PCS | Mod: CPTII,S$GLB,, | Performed by: PODIATRIST

## 2022-10-26 PROCEDURE — 99999 PR PBB SHADOW E&M-EST. PATIENT-LVL III: ICD-10-PCS | Mod: PBBFAC,,, | Performed by: PODIATRIST

## 2022-10-26 PROCEDURE — 3008F PR BODY MASS INDEX (BMI) DOCUMENTED: ICD-10-PCS | Mod: CPTII,S$GLB,, | Performed by: PODIATRIST

## 2022-10-26 PROCEDURE — 1160F RVW MEDS BY RX/DR IN RCRD: CPT | Mod: CPTII,S$GLB,, | Performed by: PODIATRIST

## 2022-10-26 PROCEDURE — 3044F PR MOST RECENT HEMOGLOBIN A1C LEVEL <7.0%: ICD-10-PCS | Mod: CPTII,S$GLB,, | Performed by: PODIATRIST

## 2022-10-26 PROCEDURE — 3044F HG A1C LEVEL LT 7.0%: CPT | Mod: CPTII,S$GLB,, | Performed by: PODIATRIST

## 2022-10-26 PROCEDURE — 99213 OFFICE O/P EST LOW 20 MIN: CPT | Mod: S$GLB,,, | Performed by: PODIATRIST

## 2022-10-26 PROCEDURE — 3008F BODY MASS INDEX DOCD: CPT | Mod: CPTII,S$GLB,, | Performed by: PODIATRIST

## 2022-10-26 PROCEDURE — 1159F MED LIST DOCD IN RCRD: CPT | Mod: CPTII,S$GLB,, | Performed by: PODIATRIST

## 2022-10-26 PROCEDURE — 99999 PR PBB SHADOW E&M-EST. PATIENT-LVL III: CPT | Mod: PBBFAC,,, | Performed by: PODIATRIST

## 2022-10-26 PROCEDURE — 1159F PR MEDICATION LIST DOCUMENTED IN MEDICAL RECORD: ICD-10-PCS | Mod: CPTII,S$GLB,, | Performed by: PODIATRIST

## 2022-10-26 NOTE — PROGRESS NOTES
CHW - Case Closure    This Community Health Worker spoke to patient via telephone today.   Pt reported thanks and will contact if help is needed later.  Pt denied any additional needs at this time and agrees with episode closure at this time.  Provided patient with Community Health Worker's contact information and encouraged him/her to contact this Community Health Worker if additional needs arise.

## 2022-10-27 ENCOUNTER — PATIENT MESSAGE (OUTPATIENT)
Dept: INTERNAL MEDICINE | Facility: CLINIC | Age: 57
End: 2022-10-27
Payer: COMMERCIAL

## 2022-10-31 NOTE — PROGRESS NOTES
"[facility]                Progress Note    Subjective:       Patient ID: Catalino Mcfadden is a 57 y.o. male.    Chief Complaint: Diabetes Mellitus (6/17/22 Dr Lombard PCP) and Follow-up (Checkup on wounds)    F/u wound care visit. Patient ambulatory to exam room with darco on right foot as ordered. Patient is walking a little "wobbly", states he started on new meds (Baclefen) for hiccups last night and it's making him "feel off balance". BP elevate 228/120 automatic, 218/110 manually), patient states he did not take BP meds in two days. Denies h/a or blurred vision but thinks dizziness if from new meds. MD informed and instructed patient that he need to be evaluated in ED, patient refused. Instructed patient if dizziness worsens, start to have blurred vision, h/a or chest pain to call 911 or go to ED immediately, patient verbalized understanding. Wound dressing to right foot intact with scant amount of drainage noted. Wound to right medial foot improving, measuring smaller in all dimensions. Wound dressings to left medial and lateral foot intact with no drainage noted, wounds to those areas are healed. Wound care done per order. RTC on 10/7/22.    10/26/2022 patient presents to clinic s/p wound care discharge.  He has been attempting to care for his feet as instructed.  He has been wearing a wider width hoka tennis shoe    Review of Systems   Constitutional:  Negative for activity change, appetite change and fever.   HENT:  Negative for congestion.    Respiratory:  Negative for shortness of breath and wheezing.    Cardiovascular:  Positive for leg swelling. Negative for chest pain.   Gastrointestinal:  Negative for abdominal pain, nausea and vomiting.   Musculoskeletal:  Positive for myalgias.   Skin:  Positive for wound.   Neurological:  Negative for dizziness and headaches.   Psychiatric/Behavioral:  The patient is not nervous/anxious.        Objective:     Vitals:    " "10/26/22 0752   Weight: 113.6 kg (250 lb 7.1 oz)   Height: 6' 1" (1.854 m)          Physical Exam  Vitals and nursing note reviewed.   Constitutional:       General: He is not in acute distress.     Appearance: He is not toxic-appearing or diaphoretic.      Comments: Pt. is well-developed, well-nourished, appears stated age, in no acute distress, alert and oriented x 3. No evidence of depression, anxiety, or agitation. Calm, cooperative, and communicative. Appropriate interactions and affect.   Cardiovascular:      Pulses:           Dorsalis pedis pulses are 2+ on the right side and 2+ on the left side.        Posterior tibial pulses are 1+ on the right side and 1+ on the left side.      Comments: There is decreased digital hair. Skin is atrophic, slightly hyperpigmented  Pulmonary:      Effort: No respiratory distress.   Musculoskeletal:      Right ankle: No swelling. No tenderness. No lateral malleolus, medial malleolus, AITF ligament, CF ligament or posterior TF ligament tenderness. Normal range of motion.      Right Achilles Tendon: No defects. Hilliard's test negative.      Left ankle: No swelling. No tenderness. No lateral malleolus, medial malleolus, AITF ligament, CF ligament or posterior TF ligament tenderness. Normal range of motion.      Left Achilles Tendon: No defects. Hilliard's test negative.      Right foot: No tenderness or bony tenderness.      Left foot: No tenderness or bony tenderness.      Comments: Decreased stride, station of gait.  apropulsive toe off.  Increased angle and base of gait.    There is equinus deformity bilateral with decreased dorsiflexion at the ankle joint bilateral. No tenderness with compression of heel. Negative tinels sign. Gait analysis reveals excessive pronation through midstance and propulsion with early heel off.     Patient has hammertoes of digits 2-5 bilateral partially reducible     Decreased first MPJ range of motion both weightbearing and nonweightbearing, no " crepitus observed the first MP joint, + dorsal flag sign.     Visible and palpable bunion without pain at dorsomedial 1st metatarsal head right and left.  Hallux abducted right and left partially reducible, tracks laterally without being track bound.  No ecchymosis, erythema, edema, or cardinal signs infection or signs of trauma same foot.    Fat pad atrophy to heels and met heads bilateral    Plantarflexed 1st ray RLE   Lymphadenopathy:      Comments: No lymphatic streaking    Negative lymphadenopathy bilateral popliteal fossa and tarsal tunnel.     Skin:     General: Skin is warm and dry.      Coloration: Skin is not pale.      Findings: Lesion (see wound descriptions below) present. No abrasion, ecchymosis, laceration or rash.      Nails: There is no clubbing.      Comments: Toenails 1-5 bilaterally discolored/yellowed, dystrophic, brittle with subungual debris.    Neurological:      Sensory: Sensory deficit present.      Comments:  [place] 5.07 monofilamant testing is diminished Kp feet. Decreased/absent vibratory sensation bilateral feet to 128Hz tuning fork.     Paresthesias, and hyperesthesia bilateral feet with no clearly identified trigger or source.           Psychiatric:         Attention and Perception: He is attentive.         Mood and Affect: Mood is not anxious. Affect is not inappropriate.         Speech: He is communicative. Speech is not slurred.         Behavior: Behavior is not combative.         10/26/2022                    Assessment:           ICD-10-CM ICD-9-CM   1. Type II diabetes mellitus with neurological manifestations  E11.49 250.60   2. History of diabetic ulcer of foot  Z86.31 V12.29   3. Hallux limitus, acquired, right  M20.5X1 735.8   4. Hallux limitus, acquired, left  M20.5X2 735.8   5. Hallux abducto valgus, left  M20.12 735.0   6. Hallux abducto valgus, right  M20.11 735.0   7. Hammer toes of both feet  M20.41 735.4    M20.42    8. Plantarflexion deformity of right  foot  M21.6X1 736.79   9. Plantar fat pad atrophy  L90.9 701.9   10. Pre-ulcerative calluses  L84 700                    Plan:            Greater than 50% of this visit spent on counseling and coordination of care.    Education about the prevention of limb loss.    Discussed wound healing cycle, skin integrity, ways to care for skin.Counseled patient on the effects of his comorbidity on healing. He verbalizes understanding that it can increase the chances of delayed healing and this prolonged exposure leads to infection or progression of infection which subsequently can result in loss of limb.    Adequate vitamin supplementation, protein intake, and hydration - discussed with patient    Sites have remained healed but vulnerable with fragile epithelium to bony prominence. Skin is still delicate therefore patient must be diligent in avoiding excessive pressure and making sure there is adequate support and padding in shoe gear.     Follow-up: 2-4 weeks but should call [facility] immediately if any signs of infection, such as fever, chills, sweats, increased redness or pain.    Short-term goals include maintaining good offloading and minimizing bioburden, promoting granulation and epithelialization to healing.  Long-term goals include keeping the wound healed by good offloading and medical management under the direction of internist.    Shoe inspection. Diabetic Foot Education. Patient reminded of the importance of good nutrition and blood sugar control to help prevent podiatric complications of diabetes. Patient instructed on proper foot hygeine. We discussed wearing proper shoe gear, daily foot inspections, never walking without protective shoe gear, never putting sharp instruments to feet.

## 2022-11-07 ENCOUNTER — HOSPITAL ENCOUNTER (EMERGENCY)
Facility: HOSPITAL | Age: 57
Discharge: HOME | End: 2022-11-07
Attending: EMERGENCY MEDICINE
Payer: COMMERCIAL

## 2022-11-07 VITALS
RESPIRATION RATE: 18 BRPM | HEART RATE: 90 BPM | SYSTOLIC BLOOD PRESSURE: 180 MMHG | DIASTOLIC BLOOD PRESSURE: 92 MMHG | BODY MASS INDEX: 31.4 KG/M2 | OXYGEN SATURATION: 100 % | WEIGHT: 238 LBS | TEMPERATURE: 98 F

## 2022-11-07 DIAGNOSIS — Z51.89 VISIT FOR WOUND CHECK: ICD-10-CM

## 2022-11-07 DIAGNOSIS — S90.822A BLISTER OF LEFT FOOT, INITIAL ENCOUNTER: Primary | ICD-10-CM

## 2022-11-07 PROCEDURE — 99283 EMERGENCY DEPT VISIT LOW MDM: CPT

## 2022-11-07 PROCEDURE — 25000003 PHARM REV CODE 250: Performed by: NURSE PRACTITIONER

## 2022-11-07 RX ORDER — LIDOCAINE HYDROCHLORIDE 10 MG/ML
10 INJECTION INFILTRATION; PERINEURAL
Status: COMPLETED | OUTPATIENT
Start: 2022-11-07 | End: 2022-11-07

## 2022-11-07 RX ADMIN — LIDOCAINE HYDROCHLORIDE 10 ML: 10 INJECTION, SOLUTION INFILTRATION; PERINEURAL at 09:11

## 2022-11-07 NOTE — DISCHARGE INSTRUCTIONS
Follow-up with your podiatrist and wound care as discussed.      Return to the emergency department immediately for any new or worsening symptoms.    Thank you for coming to our Emergency Department today. It is important to remember that some problems are difficult to diagnose and may not be found during your first visit. Be sure to follow up with your primary care doctor.  If you do not have one, you may contact the one listed on your discharge paperwork or you may also call the [facility] Appointment Desk at [phone] to schedule an appointment with one.     Return to the ER with any questions/concerns, new/concerning symptoms, worsening or failure to improve. Do not drive or make any important decisions for 24 hours if you have received any pain medications, sedatives or mood altering drugs during your ER visit.

## 2022-11-08 ENCOUNTER — PATIENT MESSAGE (OUTPATIENT)
Dept: PODIATRY | Facility: CLINIC | Age: 57
End: 2022-11-08
Payer: COMMERCIAL

## 2022-11-08 DIAGNOSIS — E11.49 TYPE II DIABETES MELLITUS WITH NEUROLOGICAL MANIFESTATIONS: Primary | ICD-10-CM

## 2022-11-08 DIAGNOSIS — L97.522 FOOT ULCERATION, LEFT, WITH FAT LAYER EXPOSED: ICD-10-CM

## 2022-11-08 DIAGNOSIS — S90.822A BLISTER (NONTHERMAL), LEFT FOOT, INITIAL ENCOUNTER: ICD-10-CM

## 2022-11-09 ENCOUNTER — HOSPITAL ENCOUNTER (OUTPATIENT)
Dept: RADIOLOGY | Facility: HOSPITAL | Age: 57
Discharge: HOME | End: 2022-11-09
Attending: PODIATRIST
Payer: COMMERCIAL

## 2022-11-09 ENCOUNTER — OFFICE VISIT (OUTPATIENT)
Dept: PODIATRY | Facility: CLINIC | Age: 57
End: 2022-11-09
Payer: COMMERCIAL

## 2022-11-09 VITALS — WEIGHT: 238.13 LBS | HEIGHT: 73 IN | BODY MASS INDEX: 31.56 KG/M2

## 2022-11-09 DIAGNOSIS — E11.22 TYPE 2 DIABETES MELLITUS WITH STAGE 5 CHRONIC KIDNEY DISEASE: Primary | ICD-10-CM

## 2022-11-09 DIAGNOSIS — L97.522 FOOT ULCERATION, LEFT, WITH FAT LAYER EXPOSED: ICD-10-CM

## 2022-11-09 DIAGNOSIS — S90.822A BLISTER (NONTHERMAL), LEFT FOOT, INITIAL ENCOUNTER: ICD-10-CM

## 2022-11-09 DIAGNOSIS — N18.5 TYPE 2 DIABETES MELLITUS WITH STAGE 5 CHRONIC KIDNEY DISEASE: Primary | ICD-10-CM

## 2022-11-09 PROCEDURE — 87075 CULTR BACTERIA EXCEPT BLOOD: CPT | Performed by: PODIATRIST

## 2022-11-09 PROCEDURE — 3044F HG A1C LEVEL LT 7.0%: CPT | Mod: CPTII,S$GLB,, | Performed by: PODIATRIST

## 2022-11-09 PROCEDURE — 87070 CULTURE OTHR SPECIMN AEROBIC: CPT | Performed by: PODIATRIST

## 2022-11-09 PROCEDURE — 99214 PR OFFICE/OUTPT VISIT, EST, LEVL IV, 30-39 MIN: ICD-10-PCS | Mod: S$GLB,,, | Performed by: PODIATRIST

## 2022-11-09 PROCEDURE — 73630 X-RAY EXAM OF FOOT: CPT | Mod: 26,LT,, | Performed by: RADIOLOGY

## 2022-11-09 PROCEDURE — 3008F BODY MASS INDEX DOCD: CPT | Mod: CPTII,S$GLB,, | Performed by: PODIATRIST

## 2022-11-09 PROCEDURE — 99999 PR PBB SHADOW E&M-EST. PATIENT-LVL III: CPT | Mod: PBBFAC,,, | Performed by: PODIATRIST

## 2022-11-09 PROCEDURE — 3044F PR MOST RECENT HEMOGLOBIN A1C LEVEL <7.0%: ICD-10-PCS | Mod: CPTII,S$GLB,, | Performed by: PODIATRIST

## 2022-11-09 PROCEDURE — 1159F PR MEDICATION LIST DOCUMENTED IN MEDICAL RECORD: ICD-10-PCS | Mod: CPTII,S$GLB,, | Performed by: PODIATRIST

## 2022-11-09 PROCEDURE — 99214 OFFICE O/P EST MOD 30 MIN: CPT | Mod: S$GLB,,, | Performed by: PODIATRIST

## 2022-11-09 PROCEDURE — 1159F MED LIST DOCD IN RCRD: CPT | Mod: CPTII,S$GLB,, | Performed by: PODIATRIST

## 2022-11-09 PROCEDURE — 73630 X-RAY EXAM OF FOOT: CPT | Mod: TC,FY,PO,LT

## 2022-11-09 PROCEDURE — 3008F PR BODY MASS INDEX (BMI) DOCUMENTED: ICD-10-PCS | Mod: CPTII,S$GLB,, | Performed by: PODIATRIST

## 2022-11-09 PROCEDURE — 73630 XR FOOT COMPLETE 3 VIEW LEFT: ICD-10-PCS | Mod: 26,LT,, | Performed by: RADIOLOGY

## 2022-11-09 PROCEDURE — 99999 PR PBB SHADOW E&M-EST. PATIENT-LVL III: ICD-10-PCS | Mod: PBBFAC,,, | Performed by: PODIATRIST

## 2022-11-09 RX ORDER — DOXYCYCLINE 100 MG/1
100 CAPSULE ORAL EVERY 12 HOURS
Qty: 20 CAPSULE | Refills: 0 | Status: SHIPPED | OUTPATIENT
Start: 2022-11-09 | End: 2023-03-24

## 2022-11-11 LAB — BACTERIA SPEC AEROBE CULT: NORMAL

## 2022-11-11 NOTE — PROGRESS NOTES
Subjective:      Patient ID: Catalino Mcfadden is a 57 y.o. male.    Chief Complaint: Foot Ulcer (Left heel)    Catalino is a 57 y.o. male who presents to the clinic for evaluation and treatment of high risk feet. Catalino has a past medical history of CKD (chronic kidney disease), stage III (07/16/2020), CKD stage 3 due to type 1 diabetes mellitus, CKD stage 3 due to type 2 diabetes mellitus, Diabetes mellitus, type 2, Diabetic foot ulcer associated with diabetes mellitus due to underlying condition (07/16/2020), Diabetic foot ulcers, Edema (08/03/2020), Hyperlipidemia, Hypertension, and Obese. Reports new onset left heel blister x several days after a sock rubbed on the left heel. Seen in ED on 11/7/22.     PCP: Azikiwe K Lombard, MD    Date Last Seen by PCP: none found    Current shoe gear:  Affected Foot: Tennis shoes     Unaffected Foot: Tennis shoes      Hemoglobin A1C   Date Value Ref Range Status   02/23/2022 5.0 4.0 - 5.6 % Final     Comment:     ADA Screening Guidelines:  5.7-6.4%  Consistent with prediabetes  >or=6.5%  Consistent with diabetes    High levels of fetal hemoglobin interfere with the HbA1C  assay. Heterozygous hemoglobin variants (HbS, HgC, etc)do  not significantly interfere with this assay.   However, presence of multiple variants may affect accuracy.     02/09/2022 5.0 4.0 - 5.6 % Final     Comment:     ADA Screening Guidelines:  5.7-6.4%  Consistent with prediabetes  >or=6.5%  Consistent with diabetes    High levels of fetal hemoglobin interfere with the HbA1C  assay. Heterozygous hemoglobin variants (HbS, HgC, etc)do  not significantly interfere with this assay.   However, presence of multiple variants may affect accuracy.     07/28/2021 5.7 (H) 4.0 - 5.6 % Final     Comment:     ADA Screening Guidelines:  5.7-6.4%  Consistent with prediabetes  >or=6.5%  Consistent with diabetes    High levels of fetal hemoglobin interfere with the HbA1C  assay. Heterozygous hemoglobin variants (HbS, HgC,  etc)do  not significantly interfere with this assay.   However, presence of multiple variants may affect accuracy.         Review of Systems   Constitutional: Negative for chills.   Cardiovascular:  Negative for chest pain and claudication.   Respiratory:  Negative for cough.    Skin:  Positive for color change, dry skin and nail changes.   Musculoskeletal:  Positive for joint pain.   Gastrointestinal:  Negative for nausea.   Neurological:  Positive for paresthesias. Negative for numbness.   Psychiatric/Behavioral:  The patient is not nervous/anxious.          Objective:      Physical Exam  Constitutional:       Appearance: He is well-developed.      Comments: Oriented to time, place, and person.   Cardiovascular:      Comments: DP and PT pulses are palpable bilaterally. 3 sec capillary refill time and toes and feet are warm to touch proximally .  There is  hair growth on the feet and toes b/l. There is no edema b/l. No spider veins or varicosities present b/l.     Musculoskeletal:      Comments: Equinus noted b/l ankles with < 10 deg DF noted. MMT 5/5 in DF/PF/Inv/Ev resistance with no reproduction of pain in any direction. Passive range of motion of ankle and pedal joints is painless b/l.     Feet:      Right foot:      Skin integrity: No callus or dry skin.      Left foot:      Skin integrity: No callus or dry skin.   Lymphadenopathy:      Comments: Negative lymphadenopathy bilateral popliteal fossa and tarsal tunnel.   Skin:     Comments: No open lesions, lacerations or wounds noted.Interdigital spaces clean, dry and intact b/l. No erythema noted to b/l foot.  Nails normal color and trophic qualities.     Neurological:      Mental Status: He is alert.      Comments: Light touch, proprioception, and sharp/dull sensation are all intact bilaterally. Protective threshold with the [place] monofilament is intact bilaterally.    Psychiatric:         Behavior: Behavior is cooperative.     11/9/22:  S/p drainage  left heel bullae formation serous drainage noted.             Assessment:       Encounter Diagnosis   Name Primary?    Foot ulceration, left, with fat layer exposed Yes         Plan:       Catalino was seen today for foot ulcer.    Diagnoses and all orders for this visit:    Foot ulceration, left, with fat layer exposed  -     Aerobic culture (Specify Source)  -     CULTURE, ANAEROBE    Other orders  -     doxycycline (VIBRAMYCIN) 100 MG Cap; Take 1 capsule (100 mg total) by mouth every 12 (twelve) hours.      I counseled the patient on his conditions, their implications and medical management.        With patient's verbal permission sterile scissors and forceps  utilized to drain bullae formation left heel. Serous drainage noted. Aerobic,anaerobic culture obtained.     Rx. Doxycycline    Left foot xray ordered.       Dressings: iodosrb  Offloading:Foam, football     Follow-up:Patient is to return to the clinic in 1 week  for follow-up but should call [facility] immediately if any signs of infection, such as fever, chills, sweats, increased redness or pain.    Short-term goals include maintaining good offloading and minimizing bioburden, promoting granulation and epithelialization to healing.  Long-term goals include keeping the wound healed by good offloading and medical management under the direction of internist.

## 2022-11-14 LAB — BACTERIA SPEC ANAEROBE CULT: NORMAL

## 2022-11-16 ENCOUNTER — OFFICE VISIT (OUTPATIENT)
Dept: PODIATRY | Facility: CLINIC | Age: 57
End: 2022-11-16
Payer: COMMERCIAL

## 2022-11-16 VITALS — WEIGHT: 238 LBS | HEIGHT: 73 IN | BODY MASS INDEX: 31.54 KG/M2

## 2022-11-16 DIAGNOSIS — I87.8 VENOUS STASIS OF BOTH LOWER EXTREMITIES: ICD-10-CM

## 2022-11-16 DIAGNOSIS — E11.49 TYPE II DIABETES MELLITUS WITH NEUROLOGICAL MANIFESTATIONS: Primary | ICD-10-CM

## 2022-11-16 DIAGNOSIS — L97.429 HEEL ULCER, LEFT, WITH UNSPECIFIED SEVERITY: ICD-10-CM

## 2022-11-16 PROCEDURE — 1159F PR MEDICATION LIST DOCUMENTED IN MEDICAL RECORD: ICD-10-PCS | Mod: CPTII,S$GLB,, | Performed by: PODIATRIST

## 2022-11-16 PROCEDURE — 3044F HG A1C LEVEL LT 7.0%: CPT | Mod: CPTII,S$GLB,, | Performed by: PODIATRIST

## 2022-11-16 PROCEDURE — 3008F BODY MASS INDEX DOCD: CPT | Mod: CPTII,S$GLB,, | Performed by: PODIATRIST

## 2022-11-16 PROCEDURE — 3044F PR MOST RECENT HEMOGLOBIN A1C LEVEL <7.0%: ICD-10-PCS | Mod: CPTII,S$GLB,, | Performed by: PODIATRIST

## 2022-11-16 PROCEDURE — 99999 PR PBB SHADOW E&M-EST. PATIENT-LVL III: CPT | Mod: PBBFAC,,, | Performed by: PODIATRIST

## 2022-11-16 PROCEDURE — 1160F PR REVIEW ALL MEDS BY PRESCRIBER/CLIN PHARMACIST DOCUMENTED: ICD-10-PCS | Mod: CPTII,S$GLB,, | Performed by: PODIATRIST

## 2022-11-16 PROCEDURE — 99213 PR OFFICE/OUTPT VISIT, EST, LEVL III, 20-29 MIN: ICD-10-PCS | Mod: S$GLB,,, | Performed by: PODIATRIST

## 2022-11-16 PROCEDURE — 1160F RVW MEDS BY RX/DR IN RCRD: CPT | Mod: CPTII,S$GLB,, | Performed by: PODIATRIST

## 2022-11-16 PROCEDURE — 99999 PR PBB SHADOW E&M-EST. PATIENT-LVL III: ICD-10-PCS | Mod: PBBFAC,,, | Performed by: PODIATRIST

## 2022-11-16 PROCEDURE — 99213 OFFICE O/P EST LOW 20 MIN: CPT | Mod: S$GLB,,, | Performed by: PODIATRIST

## 2022-11-16 PROCEDURE — 1159F MED LIST DOCD IN RCRD: CPT | Mod: CPTII,S$GLB,, | Performed by: PODIATRIST

## 2022-11-16 PROCEDURE — 3008F PR BODY MASS INDEX (BMI) DOCUMENTED: ICD-10-PCS | Mod: CPTII,S$GLB,, | Performed by: PODIATRIST

## 2022-11-16 NOTE — PROGRESS NOTES
Subjective:      Patient ID: Catalino Mcfadden is a 57 y.o. male.    Chief Complaint: Diabetes Mellitus, Wound Check, and Follow-up    Catalino is a 57 y.o. male who presents to the clinic for evaluation and treatment of high risk feet. Catalino has a past medical history of CKD (chronic kidney disease), stage III (07/16/2020), CKD stage 3 due to type 1 diabetes mellitus, CKD stage 3 due to type 2 diabetes mellitus, Diabetes mellitus, type 2, Diabetic foot ulcer associated with diabetes mellitus due to underlying condition (07/16/2020), Diabetic foot ulcers, Edema (08/03/2020), Hyperlipidemia, Hypertension, and Obese. Reports new onset left heel blister x several days after a sock rubbed on the left heel. Seen in ED on 11/7/22.     11/16/2022 returns to clinic for follow up of left heel wound.  Seen by my colleague last week, cultures obtained (NGTD) and patient placed on PO abx.  He has kept dressing intact.     PCP: Azikiwe K Lombard, MD    Date Last Seen by PCP: none found      Hemoglobin A1C   Date Value Ref Range Status   02/23/2022 5.0 4.0 - 5.6 % Final     Comment:     ADA Screening Guidelines:  5.7-6.4%  Consistent with prediabetes  >or=6.5%  Consistent with diabetes    High levels of fetal hemoglobin interfere with the HbA1C  assay. Heterozygous hemoglobin variants (HbS, HgC, etc)do  not significantly interfere with this assay.   However, presence of multiple variants may affect accuracy.     02/09/2022 5.0 4.0 - 5.6 % Final     Comment:     ADA Screening Guidelines:  5.7-6.4%  Consistent with prediabetes  >or=6.5%  Consistent with diabetes    High levels of fetal hemoglobin interfere with the HbA1C  assay. Heterozygous hemoglobin variants (HbS, HgC, etc)do  not significantly interfere with this assay.   However, presence of multiple variants may affect accuracy.     07/28/2021 5.7 (H) 4.0 - 5.6 % Final     Comment:     ADA Screening Guidelines:  5.7-6.4%  Consistent with prediabetes  >or=6.5%  Consistent with  diabetes    High levels of fetal hemoglobin interfere with the HbA1C  assay. Heterozygous hemoglobin variants (HbS, HgC, etc)do  not significantly interfere with this assay.   However, presence of multiple variants may affect accuracy.         Review of Systems   Constitutional: Negative for chills.   Cardiovascular:  Negative for chest pain and claudication.   Respiratory:  Negative for cough.    Skin:  Positive for color change, dry skin and nail changes.   Musculoskeletal:  Positive for joint pain.   Gastrointestinal:  Negative for nausea.   Neurological:  Positive for paresthesias. Negative for numbness.   Psychiatric/Behavioral:  The patient is not nervous/anxious.          Objective:      Physical Exam  Vitals and nursing note reviewed.   Constitutional:       General: He is not in acute distress.     Appearance: He is not toxic-appearing or diaphoretic.      Comments: Pt. is well-developed, well-nourished, appears stated age, in no acute distress, alert and oriented x 3. No evidence of depression, anxiety, or agitation. Calm, cooperative, and communicative. Appropriate interactions and affect.   Cardiovascular:      Pulses:           Dorsalis pedis pulses are 2+ on the right side and 2+ on the left side.        Posterior tibial pulses are 1+ on the right side and 1+ on the left side.      Comments: There is decreased digital hair. Skin is atrophic, slightly hyperpigmented  Pulmonary:      Effort: No respiratory distress.   Musculoskeletal:      Right lower leg: Edema present.      Left lower leg: Edema present.      Right ankle: Swelling present. No tenderness. No lateral malleolus, medial malleolus, AITF ligament, CF ligament or posterior TF ligament tenderness.      Right Achilles Tendon: No defects. Hilliard's test negative.      Left ankle: Swelling present. No tenderness. No lateral malleolus, medial malleolus, AITF ligament, CF ligament or posterior TF ligament tenderness.      Left Achilles Tendon: No  defects. Hilliard's test negative.      Right foot: No tenderness or bony tenderness.      Left foot: No tenderness or bony tenderness.      Comments: Decreased stride, station of gait.  apropulsive toe off.  Increased angle and base of gait.    There is equinus deformity bilateral with decreased dorsiflexion at the ankle joint bilateral. No tenderness with compression of heel. Negative tinels sign. Gait analysis reveals excessive pronation through midstance and propulsion with early heel off.     Patient has hammertoes of digits 2-5 bilateral partially reducible     Decreased first MPJ range of motion both weightbearing and nonweightbearing, no crepitus observed the first MP joint, + dorsal flag sign.     Visible and palpable bunion without pain at dorsomedial 1st metatarsal head right and left.  Hallux abducted right and left partially reducible, tracks laterally without being track bound.  No ecchymosis, erythema, edema, or cardinal signs infection or signs of trauma same foot.    Fat pad atrophy to heels and met heads bilateral    Plantarflexed 1st ray RLE   Lymphadenopathy:      Comments: No lymphatic streaking    Negative lymphadenopathy bilateral popliteal fossa and tarsal tunnel.     Skin:     General: Skin is warm and dry.      Coloration: Skin is not pale.      Findings: Lesion (see wound descriptions below) present. No abrasion, ecchymosis, laceration or rash.      Nails: There is no clubbing.      Comments: Toenails 1-5 bilaterally discolored/yellowed, dystrophic, brittle with subungual debris.    Neurological:      Sensory: Sensory deficit present.      Comments:  [place] 5.07 monofilamant testing is diminished Kp feet. Decreased/absent vibratory sensation bilateral feet to 128Hz tuning fork.     Paresthesias, and hyperesthesia bilateral feet with no clearly identified trigger or source.           Psychiatric:         Attention and Perception: He is attentive.         Mood and Affect: Mood is  not anxious. Affect is not inappropriate.         Speech: He is communicative. Speech is not slurred.         Behavior: Behavior is not combative.     11/16/2022    Some areas of deeper pressure noted but otherwise stable            11/9/22:  S/p drainage left heel bullae formation serous drainage noted.             Assessment:       Encounter Diagnoses   Name Primary?    Heel ulcer, left, with unspecified severity     Type II diabetes mellitus with neurological manifestations Yes    Venous stasis of both lower extremities          Plan:       Catalino was seen today for diabetes mellitus, wound check and follow-up.    Diagnoses and all orders for this visit:    Type II diabetes mellitus with neurological manifestations    Heel ulcer, left, with unspecified severity  -     Ambulatory referral/consult to Podiatry    Venous stasis of both lower extremities    I counseled the patient on his conditions, their implications and medical management.      Greater than 50% of this visit spent on counseling and coordination of care.    Education about the prevention of limb loss.    Discussed wound healing cycle, skin integrity, ways to care for skin.Counseled patient on the effects of biomechanical pressure on healing. He verbalizes understanding that it can increase the chances of delayed healing and this prolonged exposure leads to infection or progression of infection which subsequently can result in loss of limb.    Adequate vitamin supplementation, protein intake, and hydration - discussed with patient    The wound is cleansed of foreign material as much as possible and the base inspected for bone or abscess.  Base is fibrogranular and without bone nor joint exposure     Dressings: mepilex Ag, calamine coflex compression wrap applied to LLE.  Well tolerated  Offloading: football and darco shoe    Follow-up: 1 week but should call [facility] immediately if any signs of infection, such as fever, chills, sweats, increased redness  or pain.    Short-term goals include maintaining good offloading and minimizing bioburden, promoting granulation and epithelialization to healing.  Long-term goals include keeping the wound healed by good offloading and medical management under the direction of internist.    Shoe inspection. Diabetic Foot Education. Patient reminded of the importance of good nutrition and blood sugar control to help prevent podiatric complications of diabetes. Patient instructed on proper foot hygeine. We discussed wearing proper shoe gear, daily foot inspections, never walking without protective shoe gear, never putting sharp instruments to feet.

## 2022-11-18 ENCOUNTER — TELEPHONE (OUTPATIENT)
Dept: ENDOSCOPY | Facility: HOSPITAL | Age: 57
End: 2022-11-18
Payer: COMMERCIAL

## 2022-11-18 NOTE — TELEPHONE ENCOUNTER
Spoke with patient. He has an appointment in Dec. Will add to cancellation list as we do not have anything sooner.

## 2022-11-18 NOTE — TELEPHONE ENCOUNTER
----- Message from Rishi Rocha sent at 11/18/2022 10:48 AM CST -----  Regarding: Appt  Contact: pt  Pt is calling to get an appt with Gastro as soon as possible, pt is having constant hiccups and belching. Please call

## 2022-11-18 NOTE — TELEPHONE ENCOUNTER
----- Message from Rishi Rocha sent at 11/18/2022 12:36 PM CST -----  Regarding: Missed Call  Contact: pt [phone]  Missed Callback    Pt returning callback from missed call. Requesting to speak with somebody in Dr. Hill#  office. Please call.

## 2022-11-21 ENCOUNTER — HOSPITAL ENCOUNTER (EMERGENCY)
Facility: HOSPITAL | Age: 57
Discharge: HOME | End: 2022-11-21
Attending: EMERGENCY MEDICINE
Payer: COMMERCIAL

## 2022-11-21 VITALS
DIASTOLIC BLOOD PRESSURE: 94 MMHG | HEIGHT: 73 IN | TEMPERATURE: 98 F | HEART RATE: 86 BPM | SYSTOLIC BLOOD PRESSURE: 168 MMHG | WEIGHT: 238 LBS | RESPIRATION RATE: 19 BRPM | BODY MASS INDEX: 31.54 KG/M2 | OXYGEN SATURATION: 98 %

## 2022-11-21 DIAGNOSIS — R06.6 HICCUPS: Primary | ICD-10-CM

## 2022-11-21 LAB
ALBUMIN SERPL BCP-MCNC: 3 G/DL (ref 3.5–5.2)
ALP SERPL-CCNC: 223 U/L (ref 55–135)
ALT SERPL W/O P-5'-P-CCNC: 25 U/L (ref 10–44)
ANION GAP SERPL CALC-SCNC: 11 MMOL/L (ref 8–16)
AST SERPL-CCNC: 48 U/L (ref 10–40)
BASOPHILS # BLD AUTO: 0.04 K/UL (ref 0–0.2)
BASOPHILS NFR BLD: 0.5 % (ref 0–1.9)
BILIRUB SERPL-MCNC: 0.7 MG/DL (ref 0.1–1)
BUN SERPL-MCNC: 20 MG/DL (ref 6–20)
CALCIUM SERPL-MCNC: 9.4 MG/DL (ref 8.7–10.5)
CHLORIDE SERPL-SCNC: 95 MMOL/L (ref 95–110)
CO2 SERPL-SCNC: 27 MMOL/L (ref 23–29)
CREAT SERPL-MCNC: 5 MG/DL (ref 0.5–1.4)
DIFFERENTIAL METHOD: ABNORMAL
EOSINOPHIL # BLD AUTO: 0.1 K/UL (ref 0–0.5)
EOSINOPHIL NFR BLD: 1.4 % (ref 0–8)
ERYTHROCYTE [DISTWIDTH] IN BLOOD BY AUTOMATED COUNT: 13 % (ref 11.5–14.5)
EST. GFR  (NO RACE VARIABLE): 13 ML/MIN/1.73 M^2
GLUCOSE SERPL-MCNC: 139 MG/DL (ref 70–110)
HCT VFR BLD AUTO: 34.2 % (ref 40–54)
HGB BLD-MCNC: 11.5 G/DL (ref 14–18)
IMM GRANULOCYTES # BLD AUTO: 0.03 K/UL (ref 0–0.04)
IMM GRANULOCYTES NFR BLD AUTO: 0.4 % (ref 0–0.5)
LYMPHOCYTES # BLD AUTO: 0.7 K/UL (ref 1–4.8)
LYMPHOCYTES NFR BLD: 9.2 % (ref 18–48)
MAGNESIUM SERPL-MCNC: 1.8 MG/DL (ref 1.6–2.6)
MCH RBC QN AUTO: 29 PG (ref 27–31)
MCHC RBC AUTO-ENTMCNC: 33.6 G/DL (ref 32–36)
MCV RBC AUTO: 86 FL (ref 82–98)
MONOCYTES # BLD AUTO: 0.6 K/UL (ref 0.3–1)
MONOCYTES NFR BLD: 8.8 % (ref 4–15)
NEUTROPHILS # BLD AUTO: 5.8 K/UL (ref 1.8–7.7)
NEUTROPHILS NFR BLD: 79.7 % (ref 38–73)
NRBC BLD-RTO: 0 /100 WBC
PHOSPHATE SERPL-MCNC: 3 MG/DL (ref 2.7–4.5)
PLATELET # BLD AUTO: 120 K/UL (ref 150–450)
PLATELET BLD QL SMEAR: ABNORMAL
PMV BLD AUTO: 10.6 FL (ref 9.2–12.9)
POTASSIUM SERPL-SCNC: 3.8 MMOL/L (ref 3.5–5.1)
PROT SERPL-MCNC: 8.4 G/DL (ref 6–8.4)
RBC # BLD AUTO: 3.96 M/UL (ref 4.6–6.2)
SODIUM SERPL-SCNC: 133 MMOL/L (ref 136–145)
WBC # BLD AUTO: 7.29 K/UL (ref 3.9–12.7)

## 2022-11-21 PROCEDURE — 99284 EMERGENCY DEPT VISIT MOD MDM: CPT | Mod: 25

## 2022-11-21 PROCEDURE — 85025 COMPLETE CBC W/AUTO DIFF WBC: CPT | Performed by: EMERGENCY MEDICINE

## 2022-11-21 PROCEDURE — 83735 ASSAY OF MAGNESIUM: CPT | Performed by: EMERGENCY MEDICINE

## 2022-11-21 PROCEDURE — 96365 THER/PROPH/DIAG IV INF INIT: CPT

## 2022-11-21 PROCEDURE — 84100 ASSAY OF PHOSPHORUS: CPT | Performed by: EMERGENCY MEDICINE

## 2022-11-21 PROCEDURE — 80053 COMPREHEN METABOLIC PANEL: CPT | Performed by: EMERGENCY MEDICINE

## 2022-11-21 PROCEDURE — 25000003 PHARM REV CODE 250: Performed by: EMERGENCY MEDICINE

## 2022-11-21 PROCEDURE — 63600175 PHARM REV CODE 636 W HCPCS: Performed by: EMERGENCY MEDICINE

## 2022-11-21 RX ORDER — LIDOCAINE HYDROCHLORIDE 20 MG/ML
15 SOLUTION OROPHARYNGEAL ONCE
Status: COMPLETED | OUTPATIENT
Start: 2022-11-21 | End: 2022-11-21

## 2022-11-21 RX ORDER — METOCLOPRAMIDE 5 MG/1
5 TABLET ORAL 2 TIMES DAILY PRN
Qty: 60 TABLET | Refills: 0 | Status: SHIPPED | OUTPATIENT
Start: 2022-11-21 | End: 2023-01-12 | Stop reason: SDUPTHER

## 2022-11-21 RX ORDER — MAG HYDROX/ALUMINUM HYD/SIMETH 200-200-20
30 SUSPENSION, ORAL (FINAL DOSE FORM) ORAL ONCE
Status: COMPLETED | OUTPATIENT
Start: 2022-11-21 | End: 2022-11-21

## 2022-11-21 RX ADMIN — CHLORPROMAZINE HYDROCHLORIDE 50 MG: 25 INJECTION INTRAMUSCULAR at 12:11

## 2022-11-21 RX ADMIN — LIDOCAINE HYDROCHLORIDE 15 ML: 20 SOLUTION ORAL at 12:11

## 2022-11-21 RX ADMIN — ALUMINUM HYDROXIDE, MAGNESIUM HYDROXIDE, AND DIMETHICONE 30 ML: 200; 20; 200 SUSPENSION ORAL at 12:11

## 2022-11-21 NOTE — DISCHARGE INSTRUCTIONS
Thank you for coming to our Emergency Department today. It is important to remember that some problems or medical conditions are difficult to diagnose and may not be found or addressed during your Emergency Department visit.     Be sure to follow up with your primary care doctor and review all labs/imaging/tests that were performed during your ER visit with them. Some labs/imaging/tests may be outside of the normal range, and require non-emergent follow-up and/or further investigation/treatment/procedures/testing to help diagnose/exclude/prevent complications or other potentially serious medical conditions that were not discussed or addressed during your ER visit.    If you do not have a primary care doctor, you may contact the one listed on your discharge paperwork or you may also call the [facility] Appointment Desk at [phone] to schedule an appointment and establish care with one. It is important to your health that you have a primary care doctor.    Please take all medications as directed. All medications may potentially have side-effects and it is impossible to predict which medications may give you side-effects or what side-effects (if any) they will give you.. If you feel that you are having a negative effect or side-effect of any medication you should immediately stop taking them and seek medical attention. If you feel that you are having a life-threatening reaction call 911.    Return to the ER with any questions/concerns, new/concerning symptoms, worsening or failure to improve.     Do not drive, swim, climb to height, take a bath, operate heavy machinery, drink alcohol or take potentially sedating medications, sign any legal documents or make any important decisions for 24 hours if you have received any pain medications, sedatives or mood altering drugs during your ER visit or within 24 hours of taking them if they have been prescribed to you.     You can find additional resources for Dentists,  hearing aids, durable medical equipment, low cost pharmacies and other resources at https://[facility].org    BELOW THIS LINE ONLY APPLIES IF YOU HAVE A COVID TEST PENDING OR IF YOU HAVE BEEN DIAGNOSED WITH COVID:  Please access [facility] to review the results of your test. Until the results of your COVID test return, you should isolate yourself so as not to potentially spread illness to others.   If your COVID test returns positive, you should isolate yourself so as not to spread illness to others. After five full days, if you are feeling better and you have not had fever for 24 hours, you can return to your typical daily activities, but you must wear a mask around others for an additional 5 days.   If your COVID test returns negative and you are either unvaccinated or more than six months out from your two-dose vaccine and are not yet boosted, you should still quarantine for 5 full days followed by strict mask use for an additional 5 full days.   If your COVID test returns negative and you have received your 2-dose initial vaccine as well as a booster, you should continue strict mask use for 10 full days after the exposure.  For all those exposed, best practice includes a test at day 5 after the exposure. This can be a home test or a test through one of the many testing centers throughout our community.   Masking is always advised to limit the spread of COVID. Cdc.gov is an excellent site to obtain the latest up to date recommendations regarding COVID and COVID testing.     CDC Testing and Quarantine Guidelines for patients with exposure to a known-positive COVID-19 person:  A close exposure is defined as anyone who has had an exposure (masked or unmasked) to a known COVID -19 positive person within 6 feet of someone for a cumulative total of 15 minutes or more over a 24-hour period.   Vaccinated and/or if you recently had a positive covid test within 90 days do NOT need to quarantine after contact with someone  who had COVID-19 unless you develop symptoms.   Fully vaccinated people who have not had a positive test within 90 days, should get tested 3-5 days after their exposure, even if they don't have symptoms and wear a mask indoors in public for 14 days following exposure or until their test result is negative.      Unvaccinated and/or NOT had a positive test within 90 days and meet close exposure  You are required by CDC guidelines to quarantine for at least 5 days from time of exposure followed by 5 days of strict masking. It is recommended, but not required to test after 5 days, unless you develop symptoms, in which case you should test at that time.  If you get tested after 5 days and your test is positive, your 5 day period of isolation starts the day of the positive test.    If your exposure does not meet the above definition, you can return to your normal daily activities to include social distancing, wearing a mask and frequent handwashing.      Here is a link to guidance from the CDC:  https://www.cdc.gov/media/releases/2021/s1227-isolation-quarantine-guidance.html      [facility] Testing Sites:  https://ldh.[place].gov/page/3934      [facility] website with testing locations and guidance:  [email or web address]

## 2022-11-21 NOTE — ED NOTES
"Pt with ESRD, dialysis on T, Th and Sat. Last full dialysis was Sat.  D/t holiday schedule, pt went to dialysis today but was only able to complete 1 hr d/t sob caused by patients hiccups.  Pt states also has a history of having episodes of hiccups and belching which stimulates episodes of vomiting that last 1-2 days.  Pt has been seen by GI for this complaint and given medication that "are not working".   This episode started yesterday.  Pt denies chest pain or swelling.  Pt is AAOx3, resp even and unlabored, frequently hiccupping.  Skin warm and dry.  HOB elevated, SR up x 2, Call bell within reach.      "

## 2022-11-21 NOTE — ED PROVIDER NOTES
"Encounter Date: 11/21/2022       History     Chief Complaint   Patient presents with    Shortness of Breath     Patient reports sob x "a couple of months". Denies wheezing, cough, chest pain. Denies pulmonary, cardiac hx. Patient has excessive hiccups in triage and he states that it is the cause of the sob.     Hiccups     57 y.o. male Past Medical History:  07/16/2020: CKD (chronic kidney disease), stage III  No date: CKD stage 3 due to type 1 diabetes mellitus  No date: CKD stage 3 due to type 2 diabetes mellitus  No date: Diabetes mellitus, type 2  07/16/2020: Diabetic foot ulcer associated with diabetes mellitus due   to underlying condition  No date: Diabetic foot ulcers  08/03/2020: Edema      Comment:  generalized, including genital area  No date: Hyperlipidemia  No date: Hypertension  No date: Obese     ESRD on dialysis M, W, F, last dialysis today, seen by Dr. Hill with GI in clinic on 9/29/22 for intractable hiccups which have now been present for a month, was  placed on baclofen/protonix but baclofen made him feel very drowsy and it was discontinued, He was started on neurontin 100mg TID and plan to EGD.     Review of patient's allergies indicates:  No Known Allergies  Past Medical History:   Diagnosis Date    CKD (chronic kidney disease), stage III 07/16/2020    CKD stage 3 due to type 1 diabetes mellitus     CKD stage 3 due to type 2 diabetes mellitus     Diabetes mellitus, type 2     Diabetic foot ulcer associated with diabetes mellitus due to underlying condition 07/16/2020    Diabetic foot ulcers     Edema 08/03/2020    generalized, including genital area    Hyperlipidemia     Hypertension     Obese      Past Surgical History:   Procedure Laterality Date    BONE BIOPSY Left 7/17/2020    Procedure: BIOPSY, BONE;  Surgeon: Verona Whitmore DPM;  Location: [facility];  Service: Podiatry;  Laterality: Left;    CERVICAL DISC SURGERY  07/11/2016    DEBRIDEMENT Left 7/17/2020    Procedure: DEBRIDEMENT;  " Surgeon: Verona Whitmore DPM;  Location: [facility] OR;  Service: Podiatry;  Laterality: Left;    DEBRIDEMENT OF FOOT Right     toes & plantar surface    FRACTURE SURGERY Right     medial ankle, metal plate present    INSERTION OF TUNNELED CENTRAL VENOUS CATHETER (CVC) WITH SUBCUTANEOUS PORT N/A 6/11/2021    Procedure: TUNNEL CATH INSERTION WITHOUT PORT;  Surgeon: Jose Manuel Diagnostic Provider;  Location: [facility] OR;  Service: Radiology;  Laterality: N/A;  11AM START---PHONE PREOP 6/10/21---COVID POSTIVE ON 7/2020---NO S/S    left leg orthopedic surgery Left      Family History   Problem Relation Age of Onset    Heart disease Father     Colon cancer Neg Hx     Esophageal cancer Neg Hx      Social History     Tobacco Use    Smoking status: Some Days     Types: Cigars    Smokeless tobacco: Never   Substance Use Topics    Alcohol use: Yes     Comment: social    Drug use: No     Review of Systems   Constitutional:  Negative for fever.   HENT:  Negative for sore throat.    Respiratory:  Negative for shortness of breath.    Cardiovascular:  Negative for chest pain.   Gastrointestinal:  Negative for nausea.   Genitourinary:  Negative for dysuria.   Musculoskeletal:  Negative for back pain.   Skin:  Negative for rash.   Neurological:  Negative for weakness.   Hematological:  Does not bruise/bleed easily.   All other systems reviewed and are negative.    Physical Exam     Initial Vitals [11/21/22 1056]   BP Pulse Resp Temp SpO2   (!) 156/79 95 18 98.4 °F (36.9 °C) 99 %      MAP       --         Physical Exam    Nursing note and vitals reviewed.  Constitutional: He appears well-developed and well-nourished.   HENT:   Head: Normocephalic and atraumatic.   Eyes: EOM are normal. Pupils are equal, round, and reactive to light.   Cardiovascular:  Normal rate and regular rhythm.           Pulmonary/Chest: Effort normal.   Abdominal: He exhibits no distension.   Musculoskeletal:         General: No tenderness or edema.     Neurological: He is  alert and oriented to person, place, and time. No cranial nerve deficit.   Skin: Skin is warm and dry.   Psychiatric: He has a normal mood and affect.   B/l tm's clear  Thyroid non palpable   Constant hiccups, interferes with pt being able to talk.     ED Course   Procedures  Labs Reviewed   CBC W/ AUTO DIFFERENTIAL - Abnormal; Notable for the following components:       Result Value    RBC 3.96 (*)     Hemoglobin 11.5 (*)     Hematocrit 34.2 (*)     All other components within normal limits   COMPREHENSIVE METABOLIC PANEL - Abnormal; Notable for the following components:    Sodium 133 (*)     Glucose 139 (*)     Creatinine 5.0 (*)     Albumin 3.0 (*)     Alkaline Phosphatase 223 (*)     AST 48 (*)     eGFR 13 (*)     All other components within normal limits   MAGNESIUM   PHOSPHORUS          Imaging Results              X-Ray Chest AP Portable (Final result)  Result time 11/21/22 11:56:39      Final result by Gene Caceres MD (11/21/22 11:56:39)                   Impression:      1. No acute cardiopulmonary process appreciated.      Electronically signed by: Gene Caceres  Date:    11/21/2022  Time:    11:56               Narrative:    EXAMINATION:  XR CHEST AP PORTABLE    CLINICAL HISTORY:  Hiccough    TECHNIQUE:  Single frontal portable view of the chest was performed.  Of note, patient imaged in exaggerated AP lordotic positioning, limiting evaluation.    COMPARISON:  Chest radiograph 09/24/2022    FINDINGS:  RIJV-approach T HD C is in stable position.    Cardiomediastinal silhouette is within normal limits.    Low lung volumes bilaterally associated with hypoventilatory changes including vascular crowding.  Otherwise, no appreciable focal consolidation, overt interstitial edema, sizable pleural effusion or pneumothorax on this partially limited exam.    Multilevel degenerative changes of the imaged spine.                                       Medications   aluminum-magnesium hydroxide-simethicone 200-200-20  mg/5 mL suspension 30 mL (30 mLs Oral Given 11/21/22 1216)     And   LIDOcaine HCl 2% oral solution 15 mL (has no administration in time range)   chlorproMAZINE (THORAZINE) 50 mg in sodium chloride 0.9% 500 mL IVPB (for N/V, hiccups, tetanus) (50 mg Intravenous New Bag 11/21/22 1214)                     Will try a dose of IV thorazine, pt has failed baclofen/neurontin, reglan is next on algorithm       Symptoms completely resolved with GI cocktail. Thorazine held after approx 100ml dose.    Will discharge with gi coctail and low dose reglan   Clinical Impression:   Final diagnoses:  [R06.6] Hiccups (Primary)      ED Disposition Condition    Discharge Stable          ED Prescriptions       Medication Sig Dispense Start Date End Date Auth. Provider    GI cocktail antac/dicyc/lidoc Swish and swallow 5 mLs 4 (four) times daily as needed (hiccups). 50 mL 11/21/2022 -- Michelle Clemente MD    metoclopramide HCl (REGLAN) 5 MG tablet Take 1 tablet (5 mg total) by mouth 2 (two) times daily as needed (hiccups). 60 tablet 11/21/2022 -- Michelle Clemente MD          Follow-up Information       Follow up With Specialties Details Why Contact Info    Azikiwe K. Lombard, MD Family Medicine   [address] [place] [address]  [phone]               Michelle Clemente MD  11/21/22 5459

## 2022-11-23 ENCOUNTER — OFFICE VISIT (OUTPATIENT)
Dept: PODIATRY | Facility: CLINIC | Age: 57
End: 2022-11-23
Payer: COMMERCIAL

## 2022-11-23 VITALS — BODY MASS INDEX: 31.56 KG/M2 | HEIGHT: 73 IN | WEIGHT: 238.13 LBS

## 2022-11-23 DIAGNOSIS — L97.426 DIABETIC ULCER OF LEFT HEEL ASSOCIATED WITH TYPE 2 DIABETES MELLITUS, WITH BONE INVOLVEMENT WITHOUT EVIDENCE OF NECROSIS: ICD-10-CM

## 2022-11-23 DIAGNOSIS — N18.5 TYPE 2 DIABETES MELLITUS WITH STAGE 5 CHRONIC KIDNEY DISEASE: Primary | ICD-10-CM

## 2022-11-23 DIAGNOSIS — E11.621 DIABETIC ULCER OF LEFT HEEL ASSOCIATED WITH TYPE 2 DIABETES MELLITUS, WITH BONE INVOLVEMENT WITHOUT EVIDENCE OF NECROSIS: ICD-10-CM

## 2022-11-23 DIAGNOSIS — E11.22 TYPE 2 DIABETES MELLITUS WITH STAGE 5 CHRONIC KIDNEY DISEASE: Primary | ICD-10-CM

## 2022-11-23 PROCEDURE — 3044F HG A1C LEVEL LT 7.0%: CPT | Mod: CPTII,S$GLB,, | Performed by: PODIATRIST

## 2022-11-23 PROCEDURE — 99999 PR PBB SHADOW E&M-EST. PATIENT-LVL III: ICD-10-PCS | Mod: PBBFAC,,, | Performed by: PODIATRIST

## 2022-11-23 PROCEDURE — 99213 OFFICE O/P EST LOW 20 MIN: CPT | Mod: S$GLB,,, | Performed by: PODIATRIST

## 2022-11-23 PROCEDURE — 1159F PR MEDICATION LIST DOCUMENTED IN MEDICAL RECORD: ICD-10-PCS | Mod: CPTII,S$GLB,, | Performed by: PODIATRIST

## 2022-11-23 PROCEDURE — 3008F BODY MASS INDEX DOCD: CPT | Mod: CPTII,S$GLB,, | Performed by: PODIATRIST

## 2022-11-23 PROCEDURE — 1159F MED LIST DOCD IN RCRD: CPT | Mod: CPTII,S$GLB,, | Performed by: PODIATRIST

## 2022-11-23 PROCEDURE — 99213 PR OFFICE/OUTPT VISIT, EST, LEVL III, 20-29 MIN: ICD-10-PCS | Mod: S$GLB,,, | Performed by: PODIATRIST

## 2022-11-23 PROCEDURE — 99999 PR PBB SHADOW E&M-EST. PATIENT-LVL III: CPT | Mod: PBBFAC,,, | Performed by: PODIATRIST

## 2022-11-23 PROCEDURE — 3044F PR MOST RECENT HEMOGLOBIN A1C LEVEL <7.0%: ICD-10-PCS | Mod: CPTII,S$GLB,, | Performed by: PODIATRIST

## 2022-11-23 PROCEDURE — 3008F PR BODY MASS INDEX (BMI) DOCUMENTED: ICD-10-PCS | Mod: CPTII,S$GLB,, | Performed by: PODIATRIST

## 2022-11-25 NOTE — PROGRESS NOTES
Subjective:      Patient ID: Catalino Mcfadden is a 57 y.o. male.    Chief Complaint: Diabetes Mellitus and Foot Ulcer    Catalino is a 57 y.o. male who presents to the clinic for evaluation and treatment of high risk feet. Catalino has a past medical history of CKD (chronic kidney disease), stage III (07/16/2020), CKD stage 3 due to type 1 diabetes mellitus, CKD stage 3 due to type 2 diabetes mellitus, Diabetes mellitus, type 2, Diabetic foot ulcer associated with diabetes mellitus due to underlying condition (07/16/2020), Diabetic foot ulcers, Edema (08/03/2020), Hyperlipidemia, Hypertension, and Obese. Reports new onset left heel blister x several days after a sock rubbed on the left heel. Seen in ED on 11/7/22.     11/16/2022 returns to clinic for follow up of left heel wound.  Seen by my colleague last week, cultures obtained (NGTD) and patient placed on PO abx.  He has kept dressing intact.     11/23/22: F/u left heel ulceration. Presents with calamine coflex left foot.     PCP: Azikiwe K Lombard, MD    Date Last Seen by PCP: none found      Hemoglobin A1C   Date Value Ref Range Status   02/23/2022 5.0 4.0 - 5.6 % Final     Comment:     ADA Screening Guidelines:  5.7-6.4%  Consistent with prediabetes  >or=6.5%  Consistent with diabetes    High levels of fetal hemoglobin interfere with the HbA1C  assay. Heterozygous hemoglobin variants (HbS, HgC, etc)do  not significantly interfere with this assay.   However, presence of multiple variants may affect accuracy.     02/09/2022 5.0 4.0 - 5.6 % Final     Comment:     ADA Screening Guidelines:  5.7-6.4%  Consistent with prediabetes  >or=6.5%  Consistent with diabetes    High levels of fetal hemoglobin interfere with the HbA1C  assay. Heterozygous hemoglobin variants (HbS, HgC, etc)do  not significantly interfere with this assay.   However, presence of multiple variants may affect accuracy.     07/28/2021 5.7 (H) 4.0 - 5.6 % Final     Comment:     ADA Screening  Guidelines:  5.7-6.4%  Consistent with prediabetes  >or=6.5%  Consistent with diabetes    High levels of fetal hemoglobin interfere with the HbA1C  assay. Heterozygous hemoglobin variants (HbS, HgC, etc)do  not significantly interfere with this assay.   However, presence of multiple variants may affect accuracy.         Review of Systems   Constitutional: Negative for chills.   Cardiovascular:  Negative for chest pain and claudication.   Respiratory:  Negative for cough.    Skin:  Positive for color change, dry skin and nail changes.   Musculoskeletal:  Positive for joint pain.   Gastrointestinal:  Negative for nausea.   Neurological:  Positive for paresthesias. Negative for numbness.   Psychiatric/Behavioral:  The patient is not nervous/anxious.          Objective:      Physical Exam  Vitals and nursing note reviewed.   Constitutional:       General: He is not in acute distress.     Appearance: He is not toxic-appearing or diaphoretic.      Comments: Pt. is well-developed, well-nourished, appears stated age, in no acute distress, alert and oriented x 3. No evidence of depression, anxiety, or agitation. Calm, cooperative, and communicative. Appropriate interactions and affect.   Cardiovascular:      Pulses:           Dorsalis pedis pulses are 2+ on the right side and 2+ on the left side.        Posterior tibial pulses are 1+ on the right side and 1+ on the left side.      Comments: There is decreased digital hair. Skin is atrophic, slightly hyperpigmented  Pulmonary:      Effort: No respiratory distress.   Musculoskeletal:      Right lower leg: Edema present.      Left lower leg: Edema present.      Right ankle: Swelling present. No tenderness. No lateral malleolus, medial malleolus, AITF ligament, CF ligament or posterior TF ligament tenderness.      Right Achilles Tendon: No defects. Hilliard's test negative.      Left ankle: Swelling present. No tenderness. No lateral malleolus, medial malleolus, AITF ligament,  CF ligament or posterior TF ligament tenderness.      Left Achilles Tendon: No defects. Hilliard's test negative.      Right foot: No tenderness or bony tenderness.      Left foot: No tenderness or bony tenderness.      Comments: Decreased stride, station of gait.  apropulsive toe off.  Increased angle and base of gait.    There is equinus deformity bilateral with decreased dorsiflexion at the ankle joint bilateral. No tenderness with compression of heel. Negative tinels sign. Gait analysis reveals excessive pronation through midstance and propulsion with early heel off.     Patient has hammertoes of digits 2-5 bilateral partially reducible     Decreased first MPJ range of motion both weightbearing and nonweightbearing, no crepitus observed the first MP joint, + dorsal flag sign.     Visible and palpable bunion without pain at dorsomedial 1st metatarsal head right and left.  Hallux abducted right and left partially reducible, tracks laterally without being track bound.  No ecchymosis, erythema, edema, or cardinal signs infection or signs of trauma same foot.    Fat pad atrophy to heels and met heads bilateral    Plantarflexed 1st ray RLE   Lymphadenopathy:      Comments: No lymphatic streaking    Negative lymphadenopathy bilateral popliteal fossa and tarsal tunnel.     Skin:     General: Skin is warm and dry.      Coloration: Skin is not pale.      Findings: Lesion (see wound descriptions below) present. No abrasion, ecchymosis, laceration or rash.      Nails: There is no clubbing.      Comments: Toenails 1-5 bilaterally discolored/yellowed, dystrophic, brittle with subungual debris.    Neurological:      Sensory: Sensory deficit present.      Comments:  Riley-Dayton 5.07 monofilamant testing is diminished Kp feet. Decreased/absent vibratory sensation bilateral feet to 128Hz tuning fork.     Paresthesias, and hyperesthesia bilateral feet with no clearly identified trigger or source.           Psychiatric:          Attention and Perception: He is attentive.         Mood and Affect: Mood is not anxious. Affect is not inappropriate.         Speech: He is communicative. Speech is not slurred.         Behavior: Behavior is not combative.         11/16/2022    Some areas of deeper pressure noted but otherwise stable            11/9/22:  S/p drainage left heel bullae formation serous drainage noted.             Assessment:       No diagnosis found.        Plan:       There are no diagnoses linked to this encounter.    I counseled the patient on his conditions, their implications and medical management.      Greater than 50% of this visit spent on counseling and coordination of care.    Education about the prevention of limb loss.    Discussed wound healing cycle, skin integrity, ways to care for skin.Counseled patient on the effects of biomechanical pressure on healing. He verbalizes understanding that it can increase the chances of delayed healing and this prolonged exposure leads to infection or progression of infection which subsequently can result in loss of limb.    Adequate vitamin supplementation, protein intake, and hydration - discussed with patient    The wound is cleansed of foreign material as much as possible and the base inspected for bone or abscess.  Base is fibrogranular and without bone nor joint exposure     Dressings: calamine coflex compression wrap applied to LLE.  Well tolerated  Offloading: football and darco shoe    Follow-up: 1 week but should call [facility] immediately if any signs of infection, such as fever, chills, sweats, increased redness or pain.    Short-term goals include maintaining good offloading and minimizing bioburden, promoting granulation and epithelialization to healing.  Long-term goals include keeping the wound healed by good offloading and medical management under the direction of internist.    Shoe inspection. Diabetic Foot Education. Patient reminded of the importance of good  nutrition and blood sugar control to help prevent podiatric complications of diabetes. Patient instructed on proper foot hygeine. We discussed wearing proper shoe gear, daily foot inspections, never walking without protective shoe gear, never putting sharp instruments to feet.

## 2022-11-30 ENCOUNTER — OFFICE VISIT (OUTPATIENT)
Dept: PODIATRY | Facility: CLINIC | Age: 57
End: 2022-11-30
Payer: COMMERCIAL

## 2022-11-30 VITALS — HEIGHT: 73 IN | WEIGHT: 238.13 LBS | BODY MASS INDEX: 31.56 KG/M2

## 2022-11-30 DIAGNOSIS — N18.5 TYPE 2 DIABETES MELLITUS WITH STAGE 5 CHRONIC KIDNEY DISEASE: Primary | ICD-10-CM

## 2022-11-30 DIAGNOSIS — E11.22 TYPE 2 DIABETES MELLITUS WITH STAGE 5 CHRONIC KIDNEY DISEASE: Primary | ICD-10-CM

## 2022-11-30 DIAGNOSIS — L97.522 FOOT ULCERATION, LEFT, WITH FAT LAYER EXPOSED: ICD-10-CM

## 2022-11-30 PROCEDURE — 3044F PR MOST RECENT HEMOGLOBIN A1C LEVEL <7.0%: ICD-10-PCS | Mod: CPTII,S$GLB,, | Performed by: PODIATRIST

## 2022-11-30 PROCEDURE — 3044F HG A1C LEVEL LT 7.0%: CPT | Mod: CPTII,S$GLB,, | Performed by: PODIATRIST

## 2022-11-30 PROCEDURE — 99999 PR PBB SHADOW E&M-EST. PATIENT-LVL III: CPT | Mod: PBBFAC,,, | Performed by: PODIATRIST

## 2022-11-30 PROCEDURE — 99999 PR PBB SHADOW E&M-EST. PATIENT-LVL III: ICD-10-PCS | Mod: PBBFAC,,, | Performed by: PODIATRIST

## 2022-11-30 PROCEDURE — 3008F BODY MASS INDEX DOCD: CPT | Mod: CPTII,S$GLB,, | Performed by: PODIATRIST

## 2022-11-30 PROCEDURE — 1159F MED LIST DOCD IN RCRD: CPT | Mod: CPTII,S$GLB,, | Performed by: PODIATRIST

## 2022-11-30 PROCEDURE — 99213 OFFICE O/P EST LOW 20 MIN: CPT | Mod: S$GLB,,, | Performed by: PODIATRIST

## 2022-11-30 PROCEDURE — 3008F PR BODY MASS INDEX (BMI) DOCUMENTED: ICD-10-PCS | Mod: CPTII,S$GLB,, | Performed by: PODIATRIST

## 2022-11-30 PROCEDURE — 1159F PR MEDICATION LIST DOCUMENTED IN MEDICAL RECORD: ICD-10-PCS | Mod: CPTII,S$GLB,, | Performed by: PODIATRIST

## 2022-11-30 PROCEDURE — 99213 PR OFFICE/OUTPT VISIT, EST, LEVL III, 20-29 MIN: ICD-10-PCS | Mod: S$GLB,,, | Performed by: PODIATRIST

## 2022-12-04 NOTE — PROGRESS NOTES
Subjective:      Patient ID: Catalino Mcfadden is a 57 y.o. male.    Chief Complaint: Diabetes Mellitus and Wound Check    Catalino is a 57 y.o. male who presents to the clinic for evaluation and treatment of high risk feet. Catlaino has a past medical history of CKD (chronic kidney disease), stage III (07/16/2020), CKD stage 3 due to type 1 diabetes mellitus, CKD stage 3 due to type 2 diabetes mellitus, Diabetes mellitus, type 2, Diabetic foot ulcer associated with diabetes mellitus due to underlying condition (07/16/2020), Diabetic foot ulcers, Edema (08/03/2020), Hyperlipidemia, Hypertension, and Obese. Reports new onset left heel blister x several days after a sock rubbed on the left heel. Seen in ED on 11/7/22.     11/16/2022 returns to clinic for follow up of left heel wound.  Seen by my colleague last week, cultures obtained (NGTD) and patient placed on PO abx.  He has kept dressing intact.     11/23/22: F/u left heel ulceration. Presents with calamine coflex left foot.       11/30/22: F/u left heel ulceration. Patient reports going to work this week.     PCP: Azikiwe K Lombard, MD    Date Last Seen by PCP: none found      Hemoglobin A1C   Date Value Ref Range Status   02/23/2022 5.0 4.0 - 5.6 % Final     Comment:     ADA Screening Guidelines:  5.7-6.4%  Consistent with prediabetes  >or=6.5%  Consistent with diabetes    High levels of fetal hemoglobin interfere with the HbA1C  assay. Heterozygous hemoglobin variants (HbS, HgC, etc)do  not significantly interfere with this assay.   However, presence of multiple variants may affect accuracy.     02/09/2022 5.0 4.0 - 5.6 % Final     Comment:     ADA Screening Guidelines:  5.7-6.4%  Consistent with prediabetes  >or=6.5%  Consistent with diabetes    High levels of fetal hemoglobin interfere with the HbA1C  assay. Heterozygous hemoglobin variants (HbS, HgC, etc)do  not significantly interfere with this assay.   However, presence of multiple variants may affect accuracy.      07/28/2021 5.7 (H) 4.0 - 5.6 % Final     Comment:     ADA Screening Guidelines:  5.7-6.4%  Consistent with prediabetes  >or=6.5%  Consistent with diabetes    High levels of fetal hemoglobin interfere with the HbA1C  assay. Heterozygous hemoglobin variants (HbS, HgC, etc)do  not significantly interfere with this assay.   However, presence of multiple variants may affect accuracy.         Review of Systems   Constitutional: Negative for chills.   Cardiovascular:  Negative for chest pain and claudication.   Respiratory:  Negative for cough.    Skin:  Positive for color change, dry skin and nail changes.   Musculoskeletal:  Positive for joint pain.   Gastrointestinal:  Negative for nausea.   Neurological:  Positive for paresthesias. Negative for numbness.   Psychiatric/Behavioral:  The patient is not nervous/anxious.          Objective:      Physical Exam  Vitals and nursing note reviewed.   Constitutional:       General: He is not in acute distress.     Appearance: He is not toxic-appearing or diaphoretic.      Comments: Pt. is well-developed, well-nourished, appears stated age, in no acute distress, alert and oriented x 3. No evidence of depression, anxiety, or agitation. Calm, cooperative, and communicative. Appropriate interactions and affect.   Cardiovascular:      Pulses:           Dorsalis pedis pulses are 2+ on the right side and 2+ on the left side.        Posterior tibial pulses are 1+ on the right side and 1+ on the left side.      Comments: There is decreased digital hair. Skin is atrophic, slightly hyperpigmented  Pulmonary:      Effort: No respiratory distress.   Musculoskeletal:      Right lower leg: Edema present.      Left lower leg: Edema present.      Right ankle: Swelling present. No tenderness. No lateral malleolus, medial malleolus, AITF ligament, CF ligament or posterior TF ligament tenderness.      Right Achilles Tendon: No defects. Hilliard's test negative.      Left ankle: Swelling present.  No tenderness. No lateral malleolus, medial malleolus, AITF ligament, CF ligament or posterior TF ligament tenderness.      Left Achilles Tendon: No defects. Hilliard's test negative.      Right foot: No tenderness or bony tenderness.      Left foot: No tenderness or bony tenderness.      Comments: Decreased stride, station of gait.  apropulsive toe off.  Increased angle and base of gait.    There is equinus deformity bilateral with decreased dorsiflexion at the ankle joint bilateral. No tenderness with compression of heel. Negative tinels sign. Gait analysis reveals excessive pronation through midstance and propulsion with early heel off.     Patient has hammertoes of digits 2-5 bilateral partially reducible     Decreased first MPJ range of motion both weightbearing and nonweightbearing, no crepitus observed the first MP joint, + dorsal flag sign.     Visible and palpable bunion without pain at dorsomedial 1st metatarsal head right and left.  Hallux abducted right and left partially reducible, tracks laterally without being track bound.  No ecchymosis, erythema, edema, or cardinal signs infection or signs of trauma same foot.    Fat pad atrophy to heels and met heads bilateral    Plantarflexed 1st ray RLE   Lymphadenopathy:      Comments: No lymphatic streaking    Negative lymphadenopathy bilateral popliteal fossa and tarsal tunnel.     Skin:     General: Skin is warm and dry.      Coloration: Skin is not pale.      Findings: Lesion (see wound descriptions below) present. No abrasion, ecchymosis, laceration or rash.      Nails: There is no clubbing.      Comments: Toenails 1-5 bilaterally discolored/yellowed, dystrophic, brittle with subungual debris.   Superficial abrasion left dorsal lateral foot.    Neurological:      Sensory: Sensory deficit present.      Comments:  [place] 5.07 monofilamant testing is diminished Kp feet. Decreased/absent vibratory sensation bilateral feet to 128Hz tuning fork.      Paresthesias, and hyperesthesia bilateral feet with no clearly identified trigger or source.           Psychiatric:         Attention and Perception: He is attentive.         Mood and Affect: Mood is not anxious. Affect is not inappropriate.         Speech: He is communicative. Speech is not slurred.         Behavior: Behavior is not combative.     11/30/22 11/16/2022    Some areas of deeper pressure noted but otherwise stable            11/9/22:  S/p drainage left heel bullae formation serous drainage noted.             Assessment:       Encounter Diagnoses   Name Primary?    Type 2 diabetes mellitus with stage 5 chronic kidney disease Yes    Foot ulceration, left, with fat layer exposed            Plan:       Catalino was seen today for diabetes mellitus and wound check.    Diagnoses and all orders for this visit:    Type 2 diabetes mellitus with stage 5 chronic kidney disease    Foot ulceration, left, with fat layer exposed      I counseled the patient on his conditions, their implications and medical management.      Greater than 50% of this visit spent on counseling and coordination of care.    Education about the prevention of limb loss.    Discussed wound healing cycle, skin integrity, ways to care for skin.Counseled patient on the effects of biomechanical pressure on healing. He verbalizes understanding that it can increase the chances of delayed healing and this prolonged exposure leads to infection or progression of infection which subsequently can result in loss of limb.    Adequate vitamin supplementation, protein intake, and hydration - discussed with patient    The wound is cleansed of foreign material as much as possible and the base inspected for bone or abscess.  Base is fibrogranular and without bone nor joint exposure     Dressings: calamine coflex compression wrap applied to LLE.  Well tolerated  Offloading: football and darco shoe    Follow-up: 1 week but should call [facility] immediately  if any signs of infection, such as fever, chills, sweats, increased redness or pain.    Short-term goals include maintaining good offloading and minimizing bioburden, promoting granulation and epithelialization to healing.  Long-term goals include keeping the wound healed by good offloading and medical management under the direction of internist.    Shoe inspection. Diabetic Foot Education. Patient reminded of the importance of good nutrition and blood sugar control to help prevent podiatric complications of diabetes. Patient instructed on proper foot hygeine. We discussed wearing proper shoe gear, daily foot inspections, never walking without protective shoe gear, never putting sharp instruments to feet.

## 2022-12-07 ENCOUNTER — OFFICE VISIT (OUTPATIENT)
Dept: PODIATRY | Facility: CLINIC | Age: 57
End: 2022-12-07
Payer: COMMERCIAL

## 2022-12-07 VITALS — WEIGHT: 238.13 LBS | BODY MASS INDEX: 31.56 KG/M2 | HEIGHT: 73 IN

## 2022-12-07 DIAGNOSIS — E11.22 TYPE 2 DIABETES MELLITUS WITH STAGE 5 CHRONIC KIDNEY DISEASE: Primary | ICD-10-CM

## 2022-12-07 DIAGNOSIS — N18.5 TYPE 2 DIABETES MELLITUS WITH STAGE 5 CHRONIC KIDNEY DISEASE: Primary | ICD-10-CM

## 2022-12-07 DIAGNOSIS — L97.429 HEEL ULCER, LEFT, WITH UNSPECIFIED SEVERITY: ICD-10-CM

## 2022-12-07 PROCEDURE — 3044F PR MOST RECENT HEMOGLOBIN A1C LEVEL <7.0%: ICD-10-PCS | Mod: CPTII,S$GLB,, | Performed by: PODIATRIST

## 2022-12-07 PROCEDURE — 1159F MED LIST DOCD IN RCRD: CPT | Mod: CPTII,S$GLB,, | Performed by: PODIATRIST

## 2022-12-07 PROCEDURE — 99999 PR PBB SHADOW E&M-EST. PATIENT-LVL III: ICD-10-PCS | Mod: PBBFAC,,, | Performed by: PODIATRIST

## 2022-12-07 PROCEDURE — 1159F PR MEDICATION LIST DOCUMENTED IN MEDICAL RECORD: ICD-10-PCS | Mod: CPTII,S$GLB,, | Performed by: PODIATRIST

## 2022-12-07 PROCEDURE — 1160F PR REVIEW ALL MEDS BY PRESCRIBER/CLIN PHARMACIST DOCUMENTED: ICD-10-PCS | Mod: CPTII,S$GLB,, | Performed by: PODIATRIST

## 2022-12-07 PROCEDURE — 3008F PR BODY MASS INDEX (BMI) DOCUMENTED: ICD-10-PCS | Mod: CPTII,S$GLB,, | Performed by: PODIATRIST

## 2022-12-07 PROCEDURE — 3044F HG A1C LEVEL LT 7.0%: CPT | Mod: CPTII,S$GLB,, | Performed by: PODIATRIST

## 2022-12-07 PROCEDURE — 99999 PR PBB SHADOW E&M-EST. PATIENT-LVL III: CPT | Mod: PBBFAC,,, | Performed by: PODIATRIST

## 2022-12-07 PROCEDURE — 99213 OFFICE O/P EST LOW 20 MIN: CPT | Mod: S$GLB,,, | Performed by: PODIATRIST

## 2022-12-07 PROCEDURE — 3008F BODY MASS INDEX DOCD: CPT | Mod: CPTII,S$GLB,, | Performed by: PODIATRIST

## 2022-12-07 PROCEDURE — 1160F RVW MEDS BY RX/DR IN RCRD: CPT | Mod: CPTII,S$GLB,, | Performed by: PODIATRIST

## 2022-12-07 PROCEDURE — 99213 PR OFFICE/OUTPT VISIT, EST, LEVL III, 20-29 MIN: ICD-10-PCS | Mod: S$GLB,,, | Performed by: PODIATRIST

## 2022-12-13 ENCOUNTER — OFFICE VISIT (OUTPATIENT)
Dept: GASTROENTEROLOGY | Facility: CLINIC | Age: 57
End: 2022-12-13
Payer: COMMERCIAL

## 2022-12-13 ENCOUNTER — TELEPHONE (OUTPATIENT)
Dept: ENDOSCOPY | Facility: HOSPITAL | Age: 57
End: 2022-12-13
Payer: COMMERCIAL

## 2022-12-13 VITALS
DIASTOLIC BLOOD PRESSURE: 99 MMHG | WEIGHT: 238 LBS | SYSTOLIC BLOOD PRESSURE: 152 MMHG | HEIGHT: 73 IN | HEART RATE: 99 BPM | BODY MASS INDEX: 31.54 KG/M2

## 2022-12-13 DIAGNOSIS — K21.9 GASTROESOPHAGEAL REFLUX DISEASE, UNSPECIFIED WHETHER ESOPHAGITIS PRESENT: ICD-10-CM

## 2022-12-13 DIAGNOSIS — N18.6 KIDNEY DISEASE, CHRONIC, END STAGE ON DIALYSIS: Primary | ICD-10-CM

## 2022-12-13 DIAGNOSIS — Z99.2 KIDNEY DISEASE, CHRONIC, END STAGE ON DIALYSIS: Primary | ICD-10-CM

## 2022-12-13 DIAGNOSIS — Z12.11 COLON CANCER SCREENING: Primary | ICD-10-CM

## 2022-12-13 DIAGNOSIS — R06.6 HICCOUGHS: Primary | ICD-10-CM

## 2022-12-13 DIAGNOSIS — R06.6 CHRONIC HICCUPS: ICD-10-CM

## 2022-12-13 PROCEDURE — 3044F HG A1C LEVEL LT 7.0%: CPT | Mod: CPTII,S$GLB,, | Performed by: INTERNAL MEDICINE

## 2022-12-13 PROCEDURE — 99999 PR PBB SHADOW E&M-EST. PATIENT-LVL III: ICD-10-PCS | Mod: PBBFAC,,, | Performed by: INTERNAL MEDICINE

## 2022-12-13 PROCEDURE — 3080F DIAST BP >= 90 MM HG: CPT | Mod: CPTII,S$GLB,, | Performed by: INTERNAL MEDICINE

## 2022-12-13 PROCEDURE — 99999 PR PBB SHADOW E&M-EST. PATIENT-LVL III: CPT | Mod: PBBFAC,,, | Performed by: INTERNAL MEDICINE

## 2022-12-13 PROCEDURE — 99214 PR OFFICE/OUTPT VISIT, EST, LEVL IV, 30-39 MIN: ICD-10-PCS | Mod: S$GLB,,, | Performed by: INTERNAL MEDICINE

## 2022-12-13 PROCEDURE — 99214 OFFICE O/P EST MOD 30 MIN: CPT | Mod: S$GLB,,, | Performed by: INTERNAL MEDICINE

## 2022-12-13 PROCEDURE — 3077F SYST BP >= 140 MM HG: CPT | Mod: CPTII,S$GLB,, | Performed by: INTERNAL MEDICINE

## 2022-12-13 PROCEDURE — 3080F PR MOST RECENT DIASTOLIC BLOOD PRESSURE >= 90 MM HG: ICD-10-PCS | Mod: CPTII,S$GLB,, | Performed by: INTERNAL MEDICINE

## 2022-12-13 PROCEDURE — 3008F PR BODY MASS INDEX (BMI) DOCUMENTED: ICD-10-PCS | Mod: CPTII,S$GLB,, | Performed by: INTERNAL MEDICINE

## 2022-12-13 PROCEDURE — 3077F PR MOST RECENT SYSTOLIC BLOOD PRESSURE >= 140 MM HG: ICD-10-PCS | Mod: CPTII,S$GLB,, | Performed by: INTERNAL MEDICINE

## 2022-12-13 PROCEDURE — 3008F BODY MASS INDEX DOCD: CPT | Mod: CPTII,S$GLB,, | Performed by: INTERNAL MEDICINE

## 2022-12-13 PROCEDURE — 3044F PR MOST RECENT HEMOGLOBIN A1C LEVEL <7.0%: ICD-10-PCS | Mod: CPTII,S$GLB,, | Performed by: INTERNAL MEDICINE

## 2022-12-13 NOTE — PROGRESS NOTES
Reason for visit: Belching    HPI:  is a 57 year old gentleman with belching for several month. Recently saw  in GI clinic in Sept 2022. At that time he was noted to have medical history of DM2, ESRD on dialysis, HTN, HLP and Obesity  His chief complaint was chronic hiccups. It had been present for about a week and been to the emergency room once.  Chest x-ray was normal.  BMP was what you would expect with chronic renal failure he was discharged on Pantoprazole once a day but he did not get that filled.    As noted he has been having constant hiccups and belching for 7 days.  He also complains of solid food dysphagia which had been present before this started.  He denied any pyrosis or regurgitation has no prior early satiety.  No abdominal pain.  He has occasional urgent bowel movement after eating certain foods.  started him on Pantoprazole twice a day and baclofen 3 times a day.  EGD was scheduled but not done. Had side effects from the baclofen include confusion, disorientation, and sleepiness. After discontinuing the Baclofen Gabapentin 100 mg tid was started. Gabapentin did not help. Recent visit to the ER for the same and was given GI cocktail with resolution of his symptoms.  He was sent home on Reglan 5 mg po bid prn; didn't seem to help. Currently on PPI bid with no improvement. Eating helps with the hiccups. Sometimes the hiccups goes away for a week at a time.    Past medical, surgical, social and family history reviewed in epic    Medication allergies reviewed in epic    Review of systems:    Constitutional:  No fever, no chills, no weight loss, appetite is normal  Eyes:  No visual changes or red eyes  ENT:  No odynophagia or hoarseness of voice  Cardiovascular:  No angina or palpitation  Respiratory:  No shortness breath or wheezing  Genitourinary:  No dysuria or frequency  Musculoskeletal:  No myalgias or arthralgias  Skin:  No pruritus or eczema  Neurologic:  No headache or  seizures  Psychiatric:  No anxiety depression  Gastrointestinal:  See HPI    Physical exam:  Vitals see epic, awake, alert, oriented x3 in no acute distress    No physical exam done today.    Recent labs, imaging and endoscopy reports reviewed.      Impression: GERD with chronic hiccups    Recommendations:   Schedule EGD  Strict reflux precautions discussed  Will consider CT chest abdomen and pelvis if no clear etiology found.

## 2022-12-14 ENCOUNTER — OFFICE VISIT (OUTPATIENT)
Dept: PODIATRY | Facility: CLINIC | Age: 57
End: 2022-12-14
Payer: COMMERCIAL

## 2022-12-14 VITALS — HEIGHT: 73 IN | WEIGHT: 238.13 LBS | BODY MASS INDEX: 31.56 KG/M2

## 2022-12-14 DIAGNOSIS — L97.522 FOOT ULCERATION, LEFT, WITH FAT LAYER EXPOSED: ICD-10-CM

## 2022-12-14 DIAGNOSIS — E11.22 TYPE 2 DIABETES MELLITUS WITH STAGE 5 CHRONIC KIDNEY DISEASE: Primary | ICD-10-CM

## 2022-12-14 DIAGNOSIS — N18.5 TYPE 2 DIABETES MELLITUS WITH STAGE 5 CHRONIC KIDNEY DISEASE: Primary | ICD-10-CM

## 2022-12-14 PROCEDURE — 11042 DBRDMT SUBQ TIS 1ST 20SQCM/<: CPT | Mod: S$GLB,,, | Performed by: PODIATRIST

## 2022-12-14 PROCEDURE — 3008F PR BODY MASS INDEX (BMI) DOCUMENTED: ICD-10-PCS | Mod: CPTII,S$GLB,, | Performed by: PODIATRIST

## 2022-12-14 PROCEDURE — 99499 NO LOS: ICD-10-PCS | Mod: S$GLB,,, | Performed by: PODIATRIST

## 2022-12-14 PROCEDURE — 3044F PR MOST RECENT HEMOGLOBIN A1C LEVEL <7.0%: ICD-10-PCS | Mod: CPTII,S$GLB,, | Performed by: PODIATRIST

## 2022-12-14 PROCEDURE — 1159F PR MEDICATION LIST DOCUMENTED IN MEDICAL RECORD: ICD-10-PCS | Mod: CPTII,S$GLB,, | Performed by: PODIATRIST

## 2022-12-14 PROCEDURE — 99999 PR PBB SHADOW E&M-EST. PATIENT-LVL III: CPT | Mod: PBBFAC,,, | Performed by: PODIATRIST

## 2022-12-14 PROCEDURE — 3008F BODY MASS INDEX DOCD: CPT | Mod: CPTII,S$GLB,, | Performed by: PODIATRIST

## 2022-12-14 PROCEDURE — 99499 UNLISTED E&M SERVICE: CPT | Mod: S$GLB,,, | Performed by: PODIATRIST

## 2022-12-14 PROCEDURE — 1159F MED LIST DOCD IN RCRD: CPT | Mod: CPTII,S$GLB,, | Performed by: PODIATRIST

## 2022-12-14 PROCEDURE — 11042 PR DEBRIDEMENT, SKIN, SUB-Q TISSUE,=<20 SQ CM: ICD-10-PCS | Mod: S$GLB,,, | Performed by: PODIATRIST

## 2022-12-14 PROCEDURE — 3044F HG A1C LEVEL LT 7.0%: CPT | Mod: CPTII,S$GLB,, | Performed by: PODIATRIST

## 2022-12-14 PROCEDURE — 99999 PR PBB SHADOW E&M-EST. PATIENT-LVL III: ICD-10-PCS | Mod: PBBFAC,,, | Performed by: PODIATRIST

## 2022-12-15 ENCOUNTER — ANESTHESIA EVENT (OUTPATIENT)
Dept: ENDOSCOPY | Facility: HOSPITAL | Age: 57
End: 2022-12-15
Payer: COMMERCIAL

## 2022-12-15 RX ORDER — LIDOCAINE HYDROCHLORIDE 10 MG/ML
1 INJECTION, SOLUTION EPIDURAL; INFILTRATION; INTRACAUDAL; PERINEURAL ONCE
Status: CANCELLED | OUTPATIENT
Start: 2022-12-16 | End: 2022-12-16

## 2022-12-16 ENCOUNTER — ANESTHESIA (OUTPATIENT)
Dept: ENDOSCOPY | Facility: HOSPITAL | Age: 57
End: 2022-12-16
Payer: COMMERCIAL

## 2022-12-16 ENCOUNTER — HOSPITAL ENCOUNTER (OUTPATIENT)
Facility: HOSPITAL | Age: 57
Discharge: HOME | End: 2022-12-16
Attending: INTERNAL MEDICINE | Admitting: INTERNAL MEDICINE
Payer: COMMERCIAL

## 2022-12-16 VITALS
HEART RATE: 84 BPM | SYSTOLIC BLOOD PRESSURE: 173 MMHG | DIASTOLIC BLOOD PRESSURE: 84 MMHG | RESPIRATION RATE: 16 BRPM | TEMPERATURE: 98 F | OXYGEN SATURATION: 100 %

## 2022-12-16 DIAGNOSIS — R06.6 HICCUPS: ICD-10-CM

## 2022-12-16 PROCEDURE — 37000008 HC ANESTHESIA 1ST 15 MINUTES: Performed by: INTERNAL MEDICINE

## 2022-12-16 PROCEDURE — D9220A PRA ANESTHESIA: Mod: ANES,,, | Performed by: ANESTHESIOLOGY

## 2022-12-16 PROCEDURE — D9220A PRA ANESTHESIA: ICD-10-PCS | Mod: ANES,,, | Performed by: ANESTHESIOLOGY

## 2022-12-16 PROCEDURE — 27201012 HC FORCEPS, HOT/COLD, DISP: Performed by: INTERNAL MEDICINE

## 2022-12-16 PROCEDURE — 88305 TISSUE EXAM BY PATHOLOGIST: ICD-10-PCS | Mod: 26,,, | Performed by: PATHOLOGY

## 2022-12-16 PROCEDURE — D9220A PRA ANESTHESIA: ICD-10-PCS | Mod: CRNA,,, | Performed by: NURSE ANESTHETIST, CERTIFIED REGISTERED

## 2022-12-16 PROCEDURE — 63600175 PHARM REV CODE 636 W HCPCS: Performed by: NURSE ANESTHETIST, CERTIFIED REGISTERED

## 2022-12-16 PROCEDURE — 43239 PR EGD, FLEX, W/BIOPSY, SGL/MULTI: ICD-10-PCS | Mod: ,,, | Performed by: INTERNAL MEDICINE

## 2022-12-16 PROCEDURE — 25000003 PHARM REV CODE 250: Performed by: ANESTHESIOLOGY

## 2022-12-16 PROCEDURE — 37000009 HC ANESTHESIA EA ADD 15 MINS: Performed by: INTERNAL MEDICINE

## 2022-12-16 PROCEDURE — D9220A PRA ANESTHESIA: Mod: CRNA,,, | Performed by: NURSE ANESTHETIST, CERTIFIED REGISTERED

## 2022-12-16 PROCEDURE — 88305 TISSUE EXAM BY PATHOLOGIST: CPT | Mod: 26,,, | Performed by: PATHOLOGY

## 2022-12-16 PROCEDURE — 88305 TISSUE EXAM BY PATHOLOGIST: CPT | Mod: 59 | Performed by: PATHOLOGY

## 2022-12-16 PROCEDURE — 43239 EGD BIOPSY SINGLE/MULTIPLE: CPT | Mod: ,,, | Performed by: INTERNAL MEDICINE

## 2022-12-16 PROCEDURE — 43239 EGD BIOPSY SINGLE/MULTIPLE: CPT | Performed by: INTERNAL MEDICINE

## 2022-12-16 PROCEDURE — 25000003 PHARM REV CODE 250: Performed by: NURSE ANESTHETIST, CERTIFIED REGISTERED

## 2022-12-16 RX ORDER — PROPOFOL 10 MG/ML
VIAL (ML) INTRAVENOUS
Status: DISCONTINUED | OUTPATIENT
Start: 2022-12-16 | End: 2022-12-16

## 2022-12-16 RX ORDER — SODIUM CHLORIDE 9 MG/ML
INJECTION, SOLUTION INTRAVENOUS CONTINUOUS
Status: DISCONTINUED | OUTPATIENT
Start: 2022-12-16 | End: 2022-12-16 | Stop reason: HOSPADM

## 2022-12-16 RX ORDER — PROPOFOL 10 MG/ML
INJECTION, EMULSION INTRAVENOUS
Status: DISCONTINUED
Start: 2022-12-16 | End: 2022-12-16 | Stop reason: HOSPADM

## 2022-12-16 RX ORDER — LIDOCAINE HYDROCHLORIDE 20 MG/ML
INJECTION, SOLUTION EPIDURAL; INFILTRATION; INTRACAUDAL; PERINEURAL
Status: DISCONTINUED
Start: 2022-12-16 | End: 2022-12-16 | Stop reason: HOSPADM

## 2022-12-16 RX ORDER — LIDOCAINE HYDROCHLORIDE 20 MG/ML
INJECTION INTRAVENOUS
Status: DISCONTINUED | OUTPATIENT
Start: 2022-12-16 | End: 2022-12-16

## 2022-12-16 RX ADMIN — PROPOFOL 50 MG: 10 INJECTION, EMULSION INTRAVENOUS at 01:12

## 2022-12-16 RX ADMIN — SODIUM CHLORIDE: 0.9 INJECTION, SOLUTION INTRAVENOUS at 01:12

## 2022-12-16 RX ADMIN — LIDOCAINE HYDROCHLORIDE 100 MG: 20 INJECTION, SOLUTION INTRAVENOUS at 01:12

## 2022-12-16 RX ADMIN — PROPOFOL 150 MG: 10 INJECTION, EMULSION INTRAVENOUS at 01:12

## 2022-12-16 NOTE — PLAN OF CARE
Dr. Francois at bedside going over procedure results and plan of care with patient. Patient demonstrates understanding of discharge instructions. Patient is awake and oriented x4, vitals are stable with no complaints of pain.

## 2022-12-16 NOTE — PROGRESS NOTES
Subjective:      Patient ID: Catalino Mcfadden is a 57 y.o. male.    Chief Complaint: Diabetes Mellitus and Wound Check      Catalino is a 57 y.o. male who presents to the clinic for evaluation and treatment of high risk feet. Catalino has a past medical history of CKD (chronic kidney disease), stage III (07/16/2020), CKD stage 3 due to type 1 diabetes mellitus, CKD stage 3 due to type 2 diabetes mellitus, Diabetes mellitus, type 2, Diabetic foot ulcer associated with diabetes mellitus due to underlying condition (07/16/2020), Diabetic foot ulcers, Edema (08/03/2020), Hyperlipidemia, Hypertension, and Obese. Reports new onset left heel blister x several days after a sock rubbed on the left heel. Seen in ED on 11/7/22.     11/16/2022 returns to clinic for follow up of left heel wound.  Seen by my colleague last week, cultures obtained (NGTD) and patient placed on PO abx.  He has kept dressing intact.     11/23/22: F/u left heel ulceration. Presents with calamine coflex left foot.       11/30/22: F/u left heel ulceration. Patient reports going to work this week.     12/14/22: F/u left heel ulceration. Presents in calamine coflex wrap.     PCP: Azikiwe K Lombard, MD    Date Last Seen by PCP: none found      Hemoglobin A1C   Date Value Ref Range Status   02/23/2022 5.0 4.0 - 5.6 % Final     Comment:     ADA Screening Guidelines:  5.7-6.4%  Consistent with prediabetes  >or=6.5%  Consistent with diabetes    High levels of fetal hemoglobin interfere with the HbA1C  assay. Heterozygous hemoglobin variants (HbS, HgC, etc)do  not significantly interfere with this assay.   However, presence of multiple variants may affect accuracy.     02/09/2022 5.0 4.0 - 5.6 % Final     Comment:     ADA Screening Guidelines:  5.7-6.4%  Consistent with prediabetes  >or=6.5%  Consistent with diabetes    High levels of fetal hemoglobin interfere with the HbA1C  assay. Heterozygous hemoglobin variants (HbS, HgC, etc)do  not significantly interfere with this  assay.   However, presence of multiple variants may affect accuracy.     07/28/2021 5.7 (H) 4.0 - 5.6 % Final     Comment:     ADA Screening Guidelines:  5.7-6.4%  Consistent with prediabetes  >or=6.5%  Consistent with diabetes    High levels of fetal hemoglobin interfere with the HbA1C  assay. Heterozygous hemoglobin variants (HbS, HgC, etc)do  not significantly interfere with this assay.   However, presence of multiple variants may affect accuracy.         Review of Systems   Constitutional: Negative for chills.   Cardiovascular:  Negative for chest pain and claudication.   Respiratory:  Negative for cough.    Skin:  Positive for color change, dry skin and nail changes.   Musculoskeletal:  Positive for joint pain.   Gastrointestinal:  Negative for nausea.   Neurological:  Positive for paresthesias. Negative for numbness.   Psychiatric/Behavioral:  The patient is not nervous/anxious.          Objective:      Physical Exam  Vitals and nursing note reviewed.   Constitutional:       General: He is not in acute distress.     Appearance: He is not toxic-appearing or diaphoretic.      Comments: Pt. is well-developed, well-nourished, appears stated age, in no acute distress, alert and oriented x 3. No evidence of depression, anxiety, or agitation. Calm, cooperative, and communicative. Appropriate interactions and affect.   Cardiovascular:      Pulses:           Dorsalis pedis pulses are 2+ on the right side and 2+ on the left side.        Posterior tibial pulses are 1+ on the right side and 1+ on the left side.      Comments: There is decreased digital hair. Skin is atrophic, slightly hyperpigmented  Pulmonary:      Effort: No respiratory distress.   Musculoskeletal:      Right lower leg: Edema present.      Left lower leg: Edema present.      Right ankle: Swelling present. No tenderness. No lateral malleolus, medial malleolus, AITF ligament, CF ligament or posterior TF ligament tenderness.      Right Achilles Tendon: No  defects. Hilliard's test negative.      Left ankle: Swelling present. No tenderness. No lateral malleolus, medial malleolus, AITF ligament, CF ligament or posterior TF ligament tenderness.      Left Achilles Tendon: No defects. Hilliard's test negative.      Right foot: No tenderness or bony tenderness.      Left foot: No tenderness or bony tenderness.      Comments: Decreased stride, station of gait.  apropulsive toe off.  Increased angle and base of gait.    There is equinus deformity bilateral with decreased dorsiflexion at the ankle joint bilateral. No tenderness with compression of heel. Negative tinels sign. Gait analysis reveals excessive pronation through midstance and propulsion with early heel off.     Patient has hammertoes of digits 2-5 bilateral partially reducible     Decreased first MPJ range of motion both weightbearing and nonweightbearing, no crepitus observed the first MP joint, + dorsal flag sign.     Visible and palpable bunion without pain at dorsomedial 1st metatarsal head right and left.  Hallux abducted right and left partially reducible, tracks laterally without being track bound.  No ecchymosis, erythema, edema, or cardinal signs infection or signs of trauma same foot.    Fat pad atrophy to heels and met heads bilateral    Plantarflexed 1st ray RLE   Lymphadenopathy:      Comments: No lymphatic streaking    Negative lymphadenopathy bilateral popliteal fossa and tarsal tunnel.     Skin:     General: Skin is warm and dry.      Coloration: Skin is not pale.      Findings: Lesion (see wound descriptions below) present. No abrasion, ecchymosis, laceration or rash.      Nails: There is no clubbing.      Comments: Toenails 1-5 bilaterally discolored/yellowed, dystrophic, brittle with subungual debris.   Superficial abrasion left dorsal lateral foot.    Neurological:      Sensory: Sensory deficit present.      Comments:  [place] 5.07 monofilamant testing is diminished Kp feet.  Decreased/absent vibratory sensation bilateral feet to 128Hz tuning fork.     Paresthesias, and hyperesthesia bilateral feet with no clearly identified trigger or source.           Psychiatric:         Attention and Perception: He is attentive.         Mood and Affect: Mood is not anxious. Affect is not inappropriate.         Speech: He is communicative. Speech is not slurred.         Behavior: Behavior is not combative.     11/30/22 11/16/2022    Some areas of deeper pressure noted but otherwise stable            11/9/22:  S/p drainage left heel bullae formation serous drainage noted.             Assessment:       Encounter Diagnoses   Name Primary?    Type 2 diabetes mellitus with stage 5 chronic kidney disease Yes    Foot ulceration, left, with fat layer exposed              Plan:       Catalino was seen today for diabetes mellitus and wound check.    Diagnoses and all orders for this visit:    Type 2 diabetes mellitus with stage 5 chronic kidney disease    Foot ulceration, left, with fat layer exposed          I counseled the patient on his conditions, their implications and medical management.      Greater than 50% of this visit spent on counseling and coordination of care.    Education about the prevention of limb loss.    Discussed wound healing cycle, skin integrity, ways to care for skin.Counseled patient on the effects of biomechanical pressure on healing. He verbalizes understanding that it can increase the chances of delayed healing and this prolonged exposure leads to infection or progression of infection which subsequently can result in loss of limb.    Adequate vitamin supplementation, protein intake, and hydration - discussed with patient    The wound is cleansed of foreign material as much as possible and the base inspected for bone or abscess.  Base is fibrogranular and without bone nor joint exposure     Dressings: calamine coflex compression wrap applied to LLE.  Well tolerated  Offloading:  football and darco shoe    Follow-up: 1 week but [facility] if any signs of infection, such as fever, chills, sweats, increased redness or pain.    Short-term goals include maintaining good offloading and minimizing bioburden, promoting granulation and epithelialization to healing.  Long-term goals include keeping the wound healed by good offloading and medical management under the direction of internist.    Shoe inspection. Diabetic Foot Education. Patient reminded of the importance of good nutrition and blood sugar control to help prevent podiatric complications of diabetes. Patient instructed on proper foot hygeine. We discussed wearing proper shoe gear, daily foot inspections, never walking without protective shoe gear, never putting sharp instruments to feet.      Debridement: With verbal consent, nonviable tissues on the left foot were debrided to subq utilizing a  sterile No. 3 scalpel and forceps. Minimal bleeding controlled with direct pressure  The patient tolerated this well.

## 2022-12-16 NOTE — H&P
Short Stay Endoscopy   Pre-Procedure Note    PCP - Azikiwe K Lombard, MD  Referring Physician - Yuliana Sheldon RN  No address on file    Procedure - Endoscopy    ASA - per anesthesia  Mallampati - per anesthesia    HPI  57 y.o. male scheduled for endoscopy for evaluation of    1. Darrell         Medical History:  has a past medical history of CKD (chronic kidney disease), stage III (07/16/2020), CKD stage 3 due to type 1 diabetes mellitus, CKD stage 3 due to type 2 diabetes mellitus, Diabetes mellitus, type 2, Diabetic foot ulcer associated with diabetes mellitus due to underlying condition (07/16/2020), Diabetic foot ulcers, Edema (08/03/2020), Hyperlipidemia, Hypertension, and Obese.    Surgical History:  has a past surgical history that includes Cervical disc surgery (07/11/2016); left leg orthopedic surgery (Left); Debridement (Left, 7/17/2020); Bone biopsy (Left, 7/17/2020); Debridement of foot (Right); Fracture surgery (Right); and Insertion of tunneled central venous catheter (CVC) with subcutaneous port (N/A, 6/11/2021).    Family History: family history includes Heart disease in his father..    Social History:  reports that he has been smoking cigars. He has never used smokeless tobacco. He reports current alcohol use. He reports that he does not use drugs.    Review of patient's allergies indicates:  No Known Allergies    Medications:   Medications Prior to Admission   Medication Sig Dispense Refill Last Dose    gabapentin (NEURONTIN) 100 MG capsule Take 1 capsule (100 mg total) by mouth 3 (three) times daily. 90 capsule 11 Past Week    hydrALAZINE (APRESOLINE) 50 MG tablet Take 1.5 tablets (75 mg total) by mouth 3 (three) times daily. 405 tablet 2 12/16/2022    metoclopramide HCl (REGLAN) 5 MG tablet Take 1 tablet (5 mg total) by mouth 2 (two) times daily as needed (hiccups). 60 tablet 0 Past Week    pantoprazole (PROTONIX) 40 MG tablet Take 1 tablet (40 mg total) by mouth 2 (two) times daily. 60  tablet 12 12/15/2022    doxycycline (VIBRA-TABS) 100 MG tablet Take 1 tablet (100 mg total) by mouth 2 (two) times daily. 20 tablet 0     doxycycline (VIBRAMYCIN) 100 MG Cap Take 1 capsule (100 mg total) by mouth every 12 (twelve) hours. 20 capsule 0     dulaglutide (TRULICITY) 0.75 mg/0.5 mL pen injector INJECT 0.5 ML UNDER THE SKIN EVERY 7 DAYS 12 pen 0     GI cocktail antac/dicyc/lidoc Swish and swallow 5 mLs 4 (four) times daily as needed (hiccups). 50 mL 2     GI cocktail antac/dicyc/lidoc Swish and swallow 5 mLs 3 (three) times daily as needed (reflux). 500 mL 0     heparin sod,pork in 0.45% NaCl (HEPARIN, PORCINE, 100 UNITS) 100 unit/100 mL (1 unit/mL) SolP in sodium chloride 0.45 % 100 mL infusion Heparin Sodium (Porcine) 1,000 Units/mL Systemic       methoxy peg-epoetin beta (MIRCERA INJ) 50 mcg.       NIFEdipine (ADALAT CC) 30 MG TbSR Take 1 tablet (30 mg total) by mouth once daily. 90 tablet 2     sevelamer carbonate (RENVELA) 800 mg Tab Take 1 tablet (800 mg total) by mouth 3 (three) times daily with meals. 90 tablet 11     sodium chloride 0.9% SolP 100 mL with iron sucrose 100 mg iron/5 mL Soln 100 mg 50 mg.       torsemide (DEMADEX) 20 MG Tab Take 1 tablet (20 mg total) by mouth once daily. Take once a day on non-HD days 90 each 3          Labs:  Lab Results   Component Value Date    WBC 7.29 11/21/2022    HGB 11.5 (L) 11/21/2022    HCT 34.2 (L) 11/21/2022     (L) 11/21/2022    CHOL 211 (H) 02/23/2022    TRIG 61 02/23/2022    HDL 62 02/23/2022    ALT 25 11/21/2022    AST 48 (H) 11/21/2022     (L) 11/21/2022    K 3.8 11/21/2022    CL 95 11/21/2022    CREATININE 5.0 (H) 11/21/2022    BUN 20 11/21/2022    CO2 27 11/21/2022    TSH 1.780 02/09/2022    PSA 1.6 02/23/2022    INR 1.1 08/03/2020    HGBA1C 5.0 02/23/2022       Risks and benefits of this endoscopic procedure, including but not limited to bleeding, inflammation, infection, perforation, and death, explained to the patient prior to  procedure      Eren Francois MD

## 2022-12-16 NOTE — TRANSFER OF CARE
Anesthesia Transfer of Care Note    Patient: Catalino Mcfadden    Procedure(s) Performed: Procedure(s) (LRB):  EGD (ESOPHAGOGASTRODUODENOSCOPY) (N/A)    Patient location: GI    Anesthesia Type: general    Transport from OR: Transported from OR on room air with adequate spontaneous ventilation    Post pain: adequate analgesia    Post assessment: no apparent anesthetic complications and tolerated procedure well    Post vital signs: stable    Level of consciousness: awake and alert    Nausea/Vomiting: no nausea/vomiting    Complications: none    Transfer of care protocol was followed      Last vitals:   Visit Vitals  BP (!) 162/80 (BP Location: Right arm, Patient Position: Lying)   Pulse 90   Temp 36.5 °C (97.7 °F) (Oral)   Resp 16   SpO2 98%

## 2022-12-16 NOTE — PROVATION PATIENT INSTRUCTIONS
Discharge Summary/Instructions after an Endoscopic Procedure  Patient Name: Catalino Mcfadden  Patient MRN: [id number]  Patient YOB: 1965 Friday, December 16, 2022  Eren Francois MD  Dear patient,  As a result of recent federal legislation (The Federal Cures Act), you may   receive lab or pathology results from your procedure in your [facility]   account before your physician is able to contact you. Your physician or   their representative will relay the results to you with their   recommendations at their soonest availability.  Thank you,  RESTRICTIONS:  During your procedure today, you received medications for sedation.  These   medications may affect your judgment, balance and coordination.  Therefore,   for 24 hours, you have the following restrictions:   - DO NOT drive a car, operate machinery, make legal/financial decisions,   sign important papers or drink alcohol.    ACTIVITY:  Today: no heavy lifting, straining or running due to procedural   sedation/anesthesia.  The following day: return to full activity including work.  DIET:  Eat and drink normally unless instructed otherwise.     TREATMENT FOR COMMON SIDE EFFECTS:  - Mild abdominal pain, nausea, belching, bloating or excessive gas:  rest,   eat lightly and use a heating pad.  - Sore Throat: treat with throat lozenges and/or gargle with warm salt   water.  - Because air was used during the procedure, expelling large amounts of air   from your rectum or belching is normal.  - If a bowel prep was taken, you may not have a bowel movement for 1-3 days.    This is normal.  SYMPTOMS TO WATCH FOR AND REPORT TO YOUR PHYSICIAN:  1. Abdominal pain or bloating, other than gas cramps.  2. Chest pain.  3. Back pain.  4. Signs of infection such as: chills or fever occurring within 24 hours   after the procedure.  5. Rectal bleeding, which would show as bright red, maroon, or black stools.   (A tablespoon of blood from the rectum is not serious, especially if    hemorrhoids are present.)  6. Vomiting.  7. Weakness or dizziness.  GO DIRECTLY TO THE NEAREST EMERGENCY ROOM IF YOU HAVE ANY OF THE FOLLOWING:      Difficulty breathing              Chills and/or fever over 101 F   Persistent vomiting and/or vomiting blood   Severe abdominal pain   Severe chest pain   Black, tarry stools   Bleeding- more than one tablespoon   Any other symptom or condition that you feel may need urgent attention  Your doctor recommends these additional instructions:  If any biopsies were taken, your doctors clinic will contact you in 1 to 2   weeks with any results.  - Discharge patient to home.   - Resume previous diet.   - Continue present medications.   - Await pathology results.   - Return to GI clinic at appointment to be scheduled.  For questions, problems or results please call your physician - Eren Francois MD at Work:  [phone].  [facility] can be reached at [phone]     IF A COMPLICATION OR EMERGENCY SITUATION ARISES AND YOU ARE UNABLE TO REACH   YOUR PHYSICIAN - GO DIRECTLY TO THE EMERGENCY ROOM.  Eren Francois MD  12/16/2022 1:19:35 PM  This report has been verified and signed electronically.  Dear patient,  As a result of recent federal legislation (The Federal Cures Act), you may   receive lab or pathology results from your procedure in your [facility]   account before your physician is able to contact you. Your physician or   their representative will relay the results to you with their   recommendations at their soonest availability.  Thank you,  PROVATION

## 2022-12-16 NOTE — ANESTHESIA PREPROCEDURE EVALUATION
12/16/2022  Catalino Mcfadden is a 57 y.o., male.      Pre-op Assessment     I have reviewed the Nursing Notes.       Review of Systems  Anesthesia Hx:  No problems with previous Anesthesia    Social:  Smoker, Former Smoker    Cardiovascular:   Denies Pacemaker. Hypertension  Denies CABG/stent.     Pulmonary:  Pulmonary Normal    Renal/:   Chronic Renal Disease, ESRD    Hepatic/GI:   Bowel Prep. Denies PUD. GERD Denies Liver Disease. Denies Hepatitis.    Neurological:  Neurology Normal    Endocrine:   Diabetes, type 2 Denies Hypothyroidism. Denies Hyperthyroidism.  Obesity / BMI > 30Denies Morbid Obesity / BMI > 40      Physical Exam  General: Well nourished, Cooperative, Alert and Oriented    Airway:  Mallampati: III   Mouth Opening: Normal  TM Distance: Normal  Tongue: Normal  Neck ROM: Normal ROM    Dental:  Dentures        Anesthesia Plan  Type of Anesthesia, risks & benefits discussed:    Anesthesia Type: Gen Natural Airway  Intra-op Monitoring Plan: Standard ASA Monitors  Induction:  IV  Informed Consent: Informed consent signed with the Patient and all parties understand the risks and agree with anesthesia plan.  All questions answered.   ASA Score: 3  Day of Surgery Review of History & Physical: H&P Update referred to the surgeon/provider.    Ready For Surgery From Anesthesia Perspective.     .

## 2022-12-19 NOTE — ANESTHESIA POSTPROCEDURE EVALUATION
Anesthesia Post Evaluation    Patient: Catalino Mcfadden    Procedure(s) Performed: Procedure(s) (LRB):  EGD (ESOPHAGOGASTRODUODENOSCOPY) (N/A)    Final Anesthesia Type: general      Patient location during evaluation: GI PACU  Patient participation: Yes- Able to Participate  Level of consciousness: awake and alert  Post-procedure vital signs: reviewed and stable  Pain management: adequate  Airway patency: patent    PONV status at discharge: No PONV  Anesthetic complications: no      Cardiovascular status: blood pressure returned to baseline and hemodynamically stable  Respiratory status: unassisted and spontaneous ventilation  Hydration status: euvolemic  Follow-up not needed.          Vitals Value Taken Time   /84 12/16/22 1352   Temp 36.5 °C (97.7 °F) 12/16/22 1322   Pulse 84 12/16/22 1352   Resp 16 12/16/22 1352   SpO2 100 % 12/16/22 1352         Event Time   Out of Recovery 14:11:28         Pain/Madiha Score: No data recorded

## 2022-12-20 LAB
FINAL PATHOLOGIC DIAGNOSIS: NORMAL
GROSS: NORMAL
Lab: NORMAL

## 2022-12-21 ENCOUNTER — OFFICE VISIT (OUTPATIENT)
Dept: PODIATRY | Facility: CLINIC | Age: 57
End: 2022-12-21
Payer: COMMERCIAL

## 2022-12-21 VITALS — HEIGHT: 73 IN | BODY MASS INDEX: 31.56 KG/M2 | WEIGHT: 238.13 LBS

## 2022-12-21 DIAGNOSIS — L97.522 FOOT ULCERATION, LEFT, WITH FAT LAYER EXPOSED: Primary | ICD-10-CM

## 2022-12-21 DIAGNOSIS — N18.5 TYPE 2 DIABETES MELLITUS WITH STAGE 5 CHRONIC KIDNEY DISEASE: ICD-10-CM

## 2022-12-21 DIAGNOSIS — E11.49 TYPE II DIABETES MELLITUS WITH NEUROLOGICAL MANIFESTATIONS: ICD-10-CM

## 2022-12-21 DIAGNOSIS — E11.22 TYPE 2 DIABETES MELLITUS WITH STAGE 5 CHRONIC KIDNEY DISEASE: ICD-10-CM

## 2022-12-21 PROCEDURE — 1159F MED LIST DOCD IN RCRD: CPT | Mod: CPTII,S$GLB,, | Performed by: PODIATRIST

## 2022-12-21 PROCEDURE — 1160F RVW MEDS BY RX/DR IN RCRD: CPT | Mod: CPTII,S$GLB,, | Performed by: PODIATRIST

## 2022-12-21 PROCEDURE — 3044F HG A1C LEVEL LT 7.0%: CPT | Mod: CPTII,S$GLB,, | Performed by: PODIATRIST

## 2022-12-21 PROCEDURE — 99213 OFFICE O/P EST LOW 20 MIN: CPT | Mod: S$GLB,,, | Performed by: PODIATRIST

## 2022-12-21 PROCEDURE — 1159F PR MEDICATION LIST DOCUMENTED IN MEDICAL RECORD: ICD-10-PCS | Mod: CPTII,S$GLB,, | Performed by: PODIATRIST

## 2022-12-21 PROCEDURE — 99999 PR PBB SHADOW E&M-EST. PATIENT-LVL III: ICD-10-PCS | Mod: PBBFAC,,, | Performed by: PODIATRIST

## 2022-12-21 PROCEDURE — 99213 PR OFFICE/OUTPT VISIT, EST, LEVL III, 20-29 MIN: ICD-10-PCS | Mod: S$GLB,,, | Performed by: PODIATRIST

## 2022-12-21 PROCEDURE — 1160F PR REVIEW ALL MEDS BY PRESCRIBER/CLIN PHARMACIST DOCUMENTED: ICD-10-PCS | Mod: CPTII,S$GLB,, | Performed by: PODIATRIST

## 2022-12-21 PROCEDURE — 99999 PR PBB SHADOW E&M-EST. PATIENT-LVL III: CPT | Mod: PBBFAC,,, | Performed by: PODIATRIST

## 2022-12-21 PROCEDURE — 3044F PR MOST RECENT HEMOGLOBIN A1C LEVEL <7.0%: ICD-10-PCS | Mod: CPTII,S$GLB,, | Performed by: PODIATRIST

## 2022-12-21 PROCEDURE — 3008F PR BODY MASS INDEX (BMI) DOCUMENTED: ICD-10-PCS | Mod: CPTII,S$GLB,, | Performed by: PODIATRIST

## 2022-12-21 PROCEDURE — 3008F BODY MASS INDEX DOCD: CPT | Mod: CPTII,S$GLB,, | Performed by: PODIATRIST

## 2022-12-22 NOTE — PROGRESS NOTES
Subjective:      Patient ID: Catalino Mcfadden is a 57 y.o. male.    Chief Complaint: Diabetes Mellitus and Wound Check      Catalino is a 57 y.o. male who presents to the clinic for evaluation and treatment of high risk feet. Catalino has a past medical history of CKD (chronic kidney disease), stage III (07/16/2020), CKD stage 3 due to type 1 diabetes mellitus, CKD stage 3 due to type 2 diabetes mellitus, Diabetes mellitus, type 2, Diabetic foot ulcer associated with diabetes mellitus due to underlying condition (07/16/2020), Diabetic foot ulcers, Edema (08/03/2020), Hyperlipidemia, Hypertension, and Obese. Reports new onset left heel blister x several days after a sock rubbed on the left heel. Seen in ED on 11/7/22.     11/16/2022 returns to clinic for follow up of left heel wound.  Seen by my colleague last week, cultures obtained (NGTD) and patient placed on PO abx.  He has kept dressing intact.     11/23/22: F/u left heel ulceration. Presents with calamine coflex left foot.       11/30/22: F/u left heel ulceration. Patient reports going to work this week.     12/7/2022  F/u left heel ulceration. Dressing remained intact.  No new pedal complaints      PCP: Azikiwe K Lombard, MD    Date Last Seen by PCP: none found      Hemoglobin A1C   Date Value Ref Range Status   02/23/2022 5.0 4.0 - 5.6 % Final     Comment:     ADA Screening Guidelines:  5.7-6.4%  Consistent with prediabetes  >or=6.5%  Consistent with diabetes    High levels of fetal hemoglobin interfere with the HbA1C  assay. Heterozygous hemoglobin variants (HbS, HgC, etc)do  not significantly interfere with this assay.   However, presence of multiple variants may affect accuracy.     02/09/2022 5.0 4.0 - 5.6 % Final     Comment:     ADA Screening Guidelines:  5.7-6.4%  Consistent with prediabetes  >or=6.5%  Consistent with diabetes    High levels of fetal hemoglobin interfere with the HbA1C  assay. Heterozygous hemoglobin variants (HbS, HgC, etc)do  not significantly  "interfere with this assay.   However, presence of multiple variants may affect accuracy.     07/28/2021 5.7 (H) 4.0 - 5.6 % Final     Comment:     ADA Screening Guidelines:  5.7-6.4%  Consistent with prediabetes  >or=6.5%  Consistent with diabetes    High levels of fetal hemoglobin interfere with the HbA1C  assay. Heterozygous hemoglobin variants (HbS, HgC, etc)do  not significantly interfere with this assay.   However, presence of multiple variants may affect accuracy.         Review of Systems   Constitutional: Negative for chills.   Cardiovascular:  Negative for chest pain and claudication.   Respiratory:  Negative for cough.    Skin:  Positive for color change, dry skin and nail changes.   Musculoskeletal:  Positive for joint pain.   Gastrointestinal:  Negative for nausea.   Neurological:  Positive for paresthesias. Negative for numbness.   Psychiatric/Behavioral:  The patient is not nervous/anxious.          Objective:      Vitals:    12/07/22 0729   Weight: 108 kg (238 lb 1.6 oz)   Height: 6' 1" (1.854 m)       Physical Exam  Vitals and nursing note reviewed.   Constitutional:       General: He is not in acute distress.     Appearance: He is not toxic-appearing or diaphoretic.      Comments: Pt. is well-developed, well-nourished, appears stated age, in no acute distress, alert and oriented x 3. No evidence of depression, anxiety, or agitation. Calm, cooperative, and communicative. Appropriate interactions and affect.   Cardiovascular:      Pulses:           Dorsalis pedis pulses are 2+ on the right side and 2+ on the left side.        Posterior tibial pulses are 1+ on the right side and 1+ on the left side.      Comments: There is decreased digital hair. Skin is atrophic, slightly hyperpigmented  Pulmonary:      Effort: No respiratory distress.   Musculoskeletal:      Right lower leg: Edema present.      Left lower leg: Edema present.      Right ankle: Swelling present. No tenderness. No lateral malleolus, " medial malleolus, AITF ligament, CF ligament or posterior TF ligament tenderness.      Right Achilles Tendon: No defects. Hilliard's test negative.      Left ankle: Swelling present. No tenderness. No lateral malleolus, medial malleolus, AITF ligament, CF ligament or posterior TF ligament tenderness.      Left Achilles Tendon: No defects. Hilliard's test negative.      Right foot: No tenderness or bony tenderness.      Left foot: No tenderness or bony tenderness.      Comments: Decreased stride, station of gait.  apropulsive toe off.  Increased angle and base of gait.    There is equinus deformity bilateral with decreased dorsiflexion at the ankle joint bilateral. No tenderness with compression of heel. Negative tinels sign. Gait analysis reveals excessive pronation through midstance and propulsion with early heel off.     Patient has hammertoes of digits 2-5 bilateral partially reducible     Decreased first MPJ range of motion both weightbearing and nonweightbearing, no crepitus observed the first MP joint, + dorsal flag sign.     Visible and palpable bunion without pain at dorsomedial 1st metatarsal head right and left.  Hallux abducted right and left partially reducible, tracks laterally without being track bound.  No ecchymosis, erythema, edema, or cardinal signs infection or signs of trauma same foot.    Fat pad atrophy to heels and met heads bilateral    Plantarflexed 1st ray RLE   Lymphadenopathy:      Comments: No lymphatic streaking    Negative lymphadenopathy bilateral popliteal fossa and tarsal tunnel.     Skin:     General: Skin is warm and dry.      Coloration: Skin is not pale.      Findings: Lesion (see wound descriptions below) present. No abrasion, ecchymosis, laceration or rash.      Nails: There is no clubbing.      Comments: Toenails 1-5 bilaterally discolored/yellowed, dystrophic, brittle with subungual debris.   Superficial abrasion left dorsal lateral foot.    Neurological:      Sensory:  Sensory deficit present.      Comments:  Mosinee-Dayton 5.07 monofilamant testing is diminished Kp feet. Decreased/absent vibratory sensation bilateral feet to 128Hz tuning fork.     Paresthesias, and hyperesthesia bilateral feet with no clearly identified trigger or source.           Psychiatric:         Attention and Perception: He is attentive.         Mood and Affect: Mood is not anxious. Affect is not inappropriate.         Speech: He is communicative. Speech is not slurred.         Behavior: Behavior is not combative.     12/7/2022 11/30/22 11/16/2022    Some areas of deeper pressure noted but otherwise stable            11/9/22:  S/p drainage left heel bullae formation serous drainage noted.             Assessment:       Encounter Diagnoses   Name Primary?    Type 2 diabetes mellitus with stage 5 chronic kidney disease Yes    Heel ulcer, left, with unspecified severity            Plan:       Catalino was seen today for diabetes mellitus and wound check.    Diagnoses and all orders for this visit:    Type 2 diabetes mellitus with stage 5 chronic kidney disease    Heel ulcer, left, with unspecified severity        I counseled the patient on his conditions, their implications and medical management.      Greater than 50% of this visit spent on counseling and coordination of care.    Education about the prevention of limb loss.    Discussed wound healing cycle, skin integrity, ways to care for skin.Counseled patient on the effects of biomechanical pressure on healing. He verbalizes understanding that it can increase the chances of delayed healing and this prolonged exposure leads to infection or progression of infection which subsequently can result in loss of limb.    Adequate vitamin supplementation, protein intake, and hydration - discussed with patient    The wound is cleansed of foreign material as much as possible and the base inspected for bone or abscess.  Base is fibrogranular and without  bone nor joint exposure     Dressings: calamine coflex compression wrap applied to LLE.  Well tolerated  Offloading: football and darco shoe    Follow-up: 1 week but should call [facility] immediately if any signs of infection, such as fever, chills, sweats, increased redness or pain.    Short-term goals include maintaining good offloading and minimizing bioburden, promoting granulation and epithelialization to healing.  Long-term goals include keeping the wound healed by good offloading and medical management under the direction of internist.    Shoe inspection. Diabetic Foot Education. Patient reminded of the importance of good nutrition and blood sugar control to help prevent podiatric complications of diabetes. Patient instructed on proper foot hygeine. We discussed wearing proper shoe gear, daily foot inspections, never walking without protective shoe gear, never putting sharp instruments to feet.

## 2022-12-23 ENCOUNTER — TELEPHONE (OUTPATIENT)
Dept: GASTROENTEROLOGY | Facility: CLINIC | Age: 57
End: 2022-12-23
Payer: COMMERCIAL

## 2022-12-23 ENCOUNTER — PATIENT MESSAGE (OUTPATIENT)
Dept: GASTROENTEROLOGY | Facility: CLINIC | Age: 57
End: 2022-12-23
Payer: COMMERCIAL

## 2022-12-23 NOTE — TELEPHONE ENCOUNTER
----- Message from Rishi Rocha sent at 12/23/2022 11:28 AM CST -----  Regarding: Results  Contact: pt [phone]  Pt is calling to request plan of care. He saw the results of procedure in the portal and would like to discuss results. Please call

## 2022-12-27 ENCOUNTER — OFFICE VISIT (OUTPATIENT)
Dept: PODIATRY | Facility: CLINIC | Age: 57
End: 2022-12-27
Payer: COMMERCIAL

## 2022-12-27 ENCOUNTER — TELEPHONE (OUTPATIENT)
Dept: GASTROENTEROLOGY | Facility: CLINIC | Age: 57
End: 2022-12-27
Payer: COMMERCIAL

## 2022-12-27 DIAGNOSIS — L97.429 HEEL ULCER, LEFT, WITH UNSPECIFIED SEVERITY: ICD-10-CM

## 2022-12-27 DIAGNOSIS — N18.5 TYPE 2 DIABETES MELLITUS WITH STAGE 5 CHRONIC KIDNEY DISEASE: Primary | ICD-10-CM

## 2022-12-27 DIAGNOSIS — E11.22 TYPE 2 DIABETES MELLITUS WITH STAGE 5 CHRONIC KIDNEY DISEASE: Primary | ICD-10-CM

## 2022-12-27 PROCEDURE — 99213 PR OFFICE/OUTPT VISIT, EST, LEVL III, 20-29 MIN: ICD-10-PCS | Mod: S$GLB,,, | Performed by: PODIATRIST

## 2022-12-27 PROCEDURE — 99999 PR PBB SHADOW E&M-EST. PATIENT-LVL II: ICD-10-PCS | Mod: PBBFAC,,, | Performed by: PODIATRIST

## 2022-12-27 PROCEDURE — 99999 PR PBB SHADOW E&M-EST. PATIENT-LVL II: CPT | Mod: PBBFAC,,, | Performed by: PODIATRIST

## 2022-12-27 PROCEDURE — 1160F RVW MEDS BY RX/DR IN RCRD: CPT | Mod: CPTII,S$GLB,, | Performed by: PODIATRIST

## 2022-12-27 PROCEDURE — 1159F MED LIST DOCD IN RCRD: CPT | Mod: CPTII,S$GLB,, | Performed by: PODIATRIST

## 2022-12-27 PROCEDURE — 1160F PR REVIEW ALL MEDS BY PRESCRIBER/CLIN PHARMACIST DOCUMENTED: ICD-10-PCS | Mod: CPTII,S$GLB,, | Performed by: PODIATRIST

## 2022-12-27 PROCEDURE — 99213 OFFICE O/P EST LOW 20 MIN: CPT | Mod: S$GLB,,, | Performed by: PODIATRIST

## 2022-12-27 PROCEDURE — 1159F PR MEDICATION LIST DOCUMENTED IN MEDICAL RECORD: ICD-10-PCS | Mod: CPTII,S$GLB,, | Performed by: PODIATRIST

## 2022-12-27 NOTE — TELEPHONE ENCOUNTER
----- Message from Keshia Fu sent at 12/27/2022  9:42 AM CST -----  Regarding: return call    Type:  Patient Returning Call    Who Called: JOMAR LEDESMA [[id number]]    Who Left Message for Patient:  Alessandra Lozoya    Does the patient know what this is regarding?: Y    Can the clinic reply in [facility]: No    Best Call Back Number: [phone]    Additional Information: N/A

## 2022-12-27 NOTE — TELEPHONE ENCOUNTER
----- Message from Eren Francois MD sent at 12/27/2022  2:04 PM CST -----  Regarding: RE: return call  Sure that sounds good -thanks.  ----- Message -----  From: Alessandra Lozoya MA  Sent: 12/27/2022   9:57 AM CST  To: Eren Francois MD  Subject: FW: return call                                  Hi, Dr. Francois    I spoke to Mr. Mcfadden. Our next available appointments are not until March. He says he can't wait that long, plus he is on dialysis. Do you want us to use a 7 day hold for you to see him in clinic?    Thanks   ----- Message -----  From: Keshia Fu  Sent: 12/27/2022   9:43 AM CST  To: [facility] Staff  Subject: return call                                        Type:  Patient Returning Call    Who Called: JOMAR MCFADDEN [[id number]]    Who Left Message for Patient:  Alessandra Lozoya    Does the patient know what this is regarding?: Y    Can the clinic reply in [facility]: No    Best Call Back Number: [phone]    Additional Information: N/A

## 2022-12-31 NOTE — PROGRESS NOTES
Subjective:      Patient ID: Catalino Mcfadden is a 57 y.o. male.    Chief Complaint: Diabetes Mellitus and Wound Check    Catalino is a 57 y.o. male who presents to the clinic for evaluation and treatment of high risk feet. Catalino has a past medical history of CKD (chronic kidney disease), stage III (07/16/2020), CKD stage 3 due to type 1 diabetes mellitus, CKD stage 3 due to type 2 diabetes mellitus, Diabetes mellitus, type 2, Diabetic foot ulcer associated with diabetes mellitus due to underlying condition (07/16/2020), Diabetic foot ulcers, Edema (08/03/2020), Hyperlipidemia, Hypertension, and Obese. Reports new onset left heel blister x several days after a sock rubbed on the left heel. Seen in ED on 11/7/22.     11/16/2022 returns to clinic for follow up of left heel wound.  Seen by my colleague last week, cultures obtained (NGTD) and patient placed on PO abx.  He has kept dressing intact.     11/23/22: F/u left heel ulceration. Presents with calamine coflex left foot.     11/30/22: F/u left heel ulceration. Patient reports going to work this week.     12/14/22: F/u left heel ulceration. Presents in calamine coflex wrap.     12/21/22: F/u left heel ulceration. Presents in calamine coflex wrap.     PCP: Azikiwe K Lombard, MD    Date Last Seen by PCP: none found      Hemoglobin A1C   Date Value Ref Range Status   02/23/2022 5.0 4.0 - 5.6 % Final     Comment:     ADA Screening Guidelines:  5.7-6.4%  Consistent with prediabetes  >or=6.5%  Consistent with diabetes    High levels of fetal hemoglobin interfere with the HbA1C  assay. Heterozygous hemoglobin variants (HbS, HgC, etc)do  not significantly interfere with this assay.   However, presence of multiple variants may affect accuracy.     02/09/2022 5.0 4.0 - 5.6 % Final     Comment:     ADA Screening Guidelines:  5.7-6.4%  Consistent with prediabetes  >or=6.5%  Consistent with diabetes    High levels of fetal hemoglobin interfere with the HbA1C  assay. Heterozygous  hemoglobin variants (HbS, HgC, etc)do  not significantly interfere with this assay.   However, presence of multiple variants may affect accuracy.     07/28/2021 5.7 (H) 4.0 - 5.6 % Final     Comment:     ADA Screening Guidelines:  5.7-6.4%  Consistent with prediabetes  >or=6.5%  Consistent with diabetes    High levels of fetal hemoglobin interfere with the HbA1C  assay. Heterozygous hemoglobin variants (HbS, HgC, etc)do  not significantly interfere with this assay.   However, presence of multiple variants may affect accuracy.         Review of Systems   Constitutional: Negative for chills.   Cardiovascular:  Negative for chest pain and claudication.   Respiratory:  Negative for cough.    Skin:  Positive for color change, dry skin and nail changes.   Musculoskeletal:  Positive for joint pain.   Gastrointestinal:  Negative for nausea.   Neurological:  Positive for paresthesias. Negative for numbness.   Psychiatric/Behavioral:  The patient is not nervous/anxious.          Objective:      Physical Exam  Vitals and nursing note reviewed.   Constitutional:       General: He is not in acute distress.     Appearance: He is not toxic-appearing or diaphoretic.      Comments: Pt. is well-developed, well-nourished, appears stated age, in no acute distress, alert and oriented x 3. No evidence of depression, anxiety, or agitation. Calm, cooperative, and communicative. Appropriate interactions and affect.   Cardiovascular:      Pulses:           Dorsalis pedis pulses are 2+ on the right side and 2+ on the left side.        Posterior tibial pulses are 1+ on the right side and 1+ on the left side.      Comments: There is decreased digital hair. Skin is atrophic, slightly hyperpigmented  Pulmonary:      Effort: No respiratory distress.   Musculoskeletal:      Right lower leg: Edema present.      Left lower leg: Edema present.      Right ankle: Swelling present. No tenderness. No lateral malleolus, medial malleolus, AITF ligament, CF  ligament or posterior TF ligament tenderness.      Right Achilles Tendon: No defects. Hilliard's test negative.      Left ankle: Swelling present. No tenderness. No lateral malleolus, medial malleolus, AITF ligament, CF ligament or posterior TF ligament tenderness.      Left Achilles Tendon: No defects. Hilliard's test negative.      Right foot: No tenderness or bony tenderness.      Left foot: No tenderness or bony tenderness.      Comments: Decreased stride, station of gait.  apropulsive toe off.  Increased angle and base of gait.    There is equinus deformity bilateral with decreased dorsiflexion at the ankle joint bilateral. No tenderness with compression of heel. Negative tinels sign. Gait analysis reveals excessive pronation through midstance and propulsion with early heel off.     Patient has hammertoes of digits 2-5 bilateral partially reducible     Decreased first MPJ range of motion both weightbearing and nonweightbearing, no crepitus observed the first MP joint, + dorsal flag sign.     Visible and palpable bunion without pain at dorsomedial 1st metatarsal head right and left.  Hallux abducted right and left partially reducible, tracks laterally without being track bound.  No ecchymosis, erythema, edema, or cardinal signs infection or signs of trauma same foot.    Fat pad atrophy to heels and met heads bilateral    Plantarflexed 1st ray RLE   Lymphadenopathy:      Comments: No lymphatic streaking    Negative lymphadenopathy bilateral popliteal fossa and tarsal tunnel.     Skin:     General: Skin is warm and dry.      Coloration: Skin is not pale.      Findings: Lesion (see wound descriptions below) present. No abrasion, ecchymosis, laceration or rash.      Nails: There is no clubbing.      Comments: Toenails 1-5 bilaterally discolored/yellowed, dystrophic, brittle with subungual debris.   Superficial abrasion left dorsal lateral foot.    Neurological:      Sensory: Sensory deficit present.      Comments:   [place] 5.07 monofilamant testing is diminished Kp feet. Decreased/absent vibratory sensation bilateral feet to 128Hz tuning fork.     Paresthesias, and hyperesthesia bilateral feet with no clearly identified trigger or source.           Psychiatric:         Attention and Perception: He is attentive.         Mood and Affect: Mood is not anxious. Affect is not inappropriate.         Speech: He is communicative. Speech is not slurred.         Behavior: Behavior is not combative.     12/21/22:          11/30/22 11/16/2022    Some areas of deeper pressure noted but otherwise stable            11/9/22:  S/p drainage left heel bullae formation serous drainage noted.             Assessment:       Encounter Diagnoses   Name Primary?    Type 2 diabetes mellitus with stage 5 chronic kidney disease     Foot ulceration, left, with fat layer exposed Yes    Type II diabetes mellitus with neurological manifestations                Plan:       Catalino was seen today for diabetes mellitus and wound check.    Diagnoses and all orders for this visit:    Foot ulceration, left, with fat layer exposed    Type 2 diabetes mellitus with stage 5 chronic kidney disease    Type II diabetes mellitus with neurological manifestations        I counseled the patient on his conditions, their implications and medical management.    Education about the prevention of limb loss.    Discussed wound healing cycle, skin integrity, ways to care for skin.Counseled patient on the effects of biomechanical pressure on healing. He verbalizes understanding that it can increase the chances of delayed healing and this prolonged exposure leads to infection or progression of infection which subsequently can result in loss of limb.    Adequate vitamin supplementation, protein intake, and hydration - discussed with patient    The wound is cleansed of foreign material as much as possible and the base inspected for bone or abscess.  Base is fibrogranular  and without bone nor joint exposure     Dressings: calamine coflex compression wrap applied to LLE.  Well tolerated  Offloading: football and darco shoe    Follow-up: 1 week but should call [facility] immediately if any signs of infection, such as fever, chills, sweats, increased redness or pain.    Short-term goals include maintaining good offloading and minimizing bioburden, promoting granulation and epithelialization to healing.  Long-term goals include keeping the wound healed by good offloading and medical management under the direction of internist.    Shoe inspection. Diabetic Foot Education. Patient reminded of the importance of good nutrition and blood sugar control to help prevent podiatric complications of diabetes. Patient instructed on proper foot hygeine. We discussed wearing proper shoe gear, daily foot inspections, never walking without protective shoe gear, never putting sharp instruments to feet.
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
## 2023-01-03 NOTE — PROGRESS NOTES
Subjective:      Patient ID: Catalino Mcfadden is a 57 y.o. male.    Chief Complaint: No chief complaint on file.    Catalino is a 57 y.o. male who presents to the clinic for evaluation and treatment of high risk feet. Catalino has a past medical history of CKD (chronic kidney disease), stage III (07/16/2020), CKD stage 3 due to type 1 diabetes mellitus, CKD stage 3 due to type 2 diabetes mellitus, Diabetes mellitus, type 2, Diabetic foot ulcer associated with diabetes mellitus due to underlying condition (07/16/2020), Diabetic foot ulcers, Edema (08/03/2020), Hyperlipidemia, Hypertension, and Obese. Reports new onset left heel blister x several days after a sock rubbed on the left heel. Seen in ED on 11/7/22.     11/16/2022 returns to clinic for follow up of left heel wound.  Seen by my colleague last week, cultures obtained (NGTD) and patient placed on PO abx.  He has kept dressing intact.     11/23/22: F/u left heel ulceration. Presents with calamine coflex left foot.     11/30/22: F/u left heel ulceration. Patient reports going to work this week.     12/14/22: F/u left heel ulceration. Presents in calamine coflex wrap.     12/21/22: F/u left heel ulceration. Presents in calamine coflex wrap.     12/27/22: F/u left heel ulceration. Presents in calamine coflex wrap. No new pedal complaints, continues to work     PCP: Azikiwe K Lombard, MD    Date Last Seen by PCP: none found      Hemoglobin A1C   Date Value Ref Range Status   02/23/2022 5.0 4.0 - 5.6 % Final     Comment:     ADA Screening Guidelines:  5.7-6.4%  Consistent with prediabetes  >or=6.5%  Consistent with diabetes    High levels of fetal hemoglobin interfere with the HbA1C  assay. Heterozygous hemoglobin variants (HbS, HgC, etc)do  not significantly interfere with this assay.   However, presence of multiple variants may affect accuracy.     02/09/2022 5.0 4.0 - 5.6 % Final     Comment:     ADA Screening Guidelines:  5.7-6.4%  Consistent with prediabetes  >or=6.5%   Consistent with diabetes    High levels of fetal hemoglobin interfere with the HbA1C  assay. Heterozygous hemoglobin variants (HbS, HgC, etc)do  not significantly interfere with this assay.   However, presence of multiple variants may affect accuracy.     07/28/2021 5.7 (H) 4.0 - 5.6 % Final     Comment:     ADA Screening Guidelines:  5.7-6.4%  Consistent with prediabetes  >or=6.5%  Consistent with diabetes    High levels of fetal hemoglobin interfere with the HbA1C  assay. Heterozygous hemoglobin variants (HbS, HgC, etc)do  not significantly interfere with this assay.   However, presence of multiple variants may affect accuracy.         Review of Systems   Constitutional: Negative for chills.   Cardiovascular:  Negative for chest pain and claudication.   Respiratory:  Negative for cough.    Skin:  Positive for color change, dry skin and nail changes.   Musculoskeletal:  Positive for joint pain.   Gastrointestinal:  Negative for nausea.   Neurological:  Positive for paresthesias. Negative for numbness.   Psychiatric/Behavioral:  The patient is not nervous/anxious.          Objective:      Physical Exam  Vitals and nursing note reviewed.   Constitutional:       General: He is not in acute distress.     Appearance: He is not toxic-appearing or diaphoretic.      Comments: Pt. is well-developed, well-nourished, appears stated age, in no acute distress, alert and oriented x 3. No evidence of depression, anxiety, or agitation. Calm, cooperative, and communicative. Appropriate interactions and affect.   Cardiovascular:      Pulses:           Dorsalis pedis pulses are 2+ on the right side and 2+ on the left side.        Posterior tibial pulses are 1+ on the right side and 1+ on the left side.      Comments: There is decreased digital hair. Skin is atrophic, slightly hyperpigmented  Pulmonary:      Effort: No respiratory distress.   Musculoskeletal:      Right lower leg: Edema present.      Left lower leg: Edema present.       Right ankle: Swelling present. No tenderness. No lateral malleolus, medial malleolus, AITF ligament, CF ligament or posterior TF ligament tenderness.      Right Achilles Tendon: No defects. Hilliard's test negative.      Left ankle: Swelling present. No tenderness. No lateral malleolus, medial malleolus, AITF ligament, CF ligament or posterior TF ligament tenderness.      Left Achilles Tendon: No defects. Hilliard's test negative.      Right foot: No tenderness or bony tenderness.      Left foot: No tenderness or bony tenderness.      Comments: Decreased stride, station of gait.  apropulsive toe off.  Increased angle and base of gait.    There is equinus deformity bilateral with decreased dorsiflexion at the ankle joint bilateral. No tenderness with compression of heel. Negative tinels sign. Gait analysis reveals excessive pronation through midstance and propulsion with early heel off.     Patient has hammertoes of digits 2-5 bilateral partially reducible     Decreased first MPJ range of motion both weightbearing and nonweightbearing, no crepitus observed the first MP joint, + dorsal flag sign.     Visible and palpable bunion without pain at dorsomedial 1st metatarsal head right and left.  Hallux abducted right and left partially reducible, tracks laterally without being track bound.  No ecchymosis, erythema, edema, or cardinal signs infection or signs of trauma same foot.    Fat pad atrophy to heels and met heads bilateral    Plantarflexed 1st ray RLE   Lymphadenopathy:      Comments: No lymphatic streaking    Negative lymphadenopathy bilateral popliteal fossa and tarsal tunnel.     Skin:     General: Skin is warm and dry.      Coloration: Skin is not pale.      Findings: Lesion (see wound descriptions below) present. No abrasion, ecchymosis, laceration or rash.      Nails: There is no clubbing.      Comments: Toenails 1-5 bilaterally discolored/yellowed, dystrophic, brittle with subungual debris.   Superficial  abrasion left dorsal lateral foot.    Neurological:      Sensory: Sensory deficit present.      Comments:  [place] 5.07 monofilamant testing is diminished Kp feet. Decreased/absent vibratory sensation bilateral feet to 128Hz tuning fork.     Paresthesias, and hyperesthesia bilateral feet with no clearly identified trigger or source.           Psychiatric:         Attention and Perception: He is attentive.         Mood and Affect: Mood is not anxious. Affect is not inappropriate.         Speech: He is communicative. Speech is not slurred.         Behavior: Behavior is not combative.     12/27/22:         12/21/22:          11/30/22 11/16/2022    Some areas of deeper pressure noted but otherwise stable            11/9/22:  S/p drainage left heel bullae formation serous drainage noted.             Assessment:       Encounter Diagnoses   Name Primary?    Type 2 diabetes mellitus with stage 5 chronic kidney disease Yes    Heel ulcer, left, with unspecified severity                  Plan:       Diagnoses and all orders for this visit:    Type 2 diabetes mellitus with stage 5 chronic kidney disease    Heel ulcer, left, with unspecified severity          I counseled the patient on his conditions, their implications and medical management.    Education about the prevention of limb loss.    Discussed wound healing cycle, skin integrity, ways to care for skin.Counseled patient on the effects of biomechanical pressure on healing. He verbalizes understanding that it can increase the chances of delayed healing and this prolonged exposure leads to infection or progression of infection which subsequently can result in loss of limb.    Adequate vitamin supplementation, protein intake, and hydration - discussed with patient    The wound is cleansed of foreign material as much as possible and the base inspected for bone or abscess.  Base is granular and without bone nor joint exposure     Improved    Dressings:  calamine coflex compression wrap applied to LLE.  Well tolerated  Offloading: football and darco shoe    Follow-up: 1 week but should call [facility] immediately if any signs of infection, such as fever, chills, sweats, increased redness or pain.    Short-term goals include maintaining good offloading and minimizing bioburden, promoting granulation and epithelialization to healing.  Long-term goals include keeping the wound healed by good offloading and medical management under the direction of internist.    Shoe inspection. Diabetic Foot Education. Patient reminded of the importance of good nutrition and blood sugar control to help prevent podiatric complications of diabetes. Patient instructed on proper foot hygeine. We discussed wearing proper shoe gear, daily foot inspections, never walking without protective shoe gear, never putting sharp instruments to feet.

## 2023-01-04 ENCOUNTER — PATIENT OUTREACH (OUTPATIENT)
Dept: ADMINISTRATIVE | Facility: HOSPITAL | Age: 58
End: 2023-01-04
Payer: COMMERCIAL

## 2023-01-04 ENCOUNTER — OFFICE VISIT (OUTPATIENT)
Dept: PODIATRY | Facility: CLINIC | Age: 58
End: 2023-01-04
Payer: COMMERCIAL

## 2023-01-04 VITALS — HEIGHT: 73 IN | BODY MASS INDEX: 31.56 KG/M2 | WEIGHT: 238.13 LBS

## 2023-01-04 DIAGNOSIS — N18.5 TYPE 2 DIABETES MELLITUS WITH STAGE 5 CHRONIC KIDNEY DISEASE: ICD-10-CM

## 2023-01-04 DIAGNOSIS — E11.22 TYPE 2 DIABETES MELLITUS WITH STAGE 5 CHRONIC KIDNEY DISEASE: ICD-10-CM

## 2023-01-04 DIAGNOSIS — L97.429 HEEL ULCER, LEFT, WITH UNSPECIFIED SEVERITY: Primary | ICD-10-CM

## 2023-01-04 DIAGNOSIS — E11.49 TYPE II DIABETES MELLITUS WITH NEUROLOGICAL MANIFESTATIONS: ICD-10-CM

## 2023-01-04 PROCEDURE — 3008F BODY MASS INDEX DOCD: CPT | Mod: CPTII,S$GLB,, | Performed by: PODIATRIST

## 2023-01-04 PROCEDURE — 99213 PR OFFICE/OUTPT VISIT, EST, LEVL III, 20-29 MIN: ICD-10-PCS | Mod: S$GLB,,, | Performed by: PODIATRIST

## 2023-01-04 PROCEDURE — 3008F PR BODY MASS INDEX (BMI) DOCUMENTED: ICD-10-PCS | Mod: CPTII,S$GLB,, | Performed by: PODIATRIST

## 2023-01-04 PROCEDURE — 1160F RVW MEDS BY RX/DR IN RCRD: CPT | Mod: CPTII,S$GLB,, | Performed by: PODIATRIST

## 2023-01-04 PROCEDURE — 1159F MED LIST DOCD IN RCRD: CPT | Mod: CPTII,S$GLB,, | Performed by: PODIATRIST

## 2023-01-04 PROCEDURE — 99999 PR PBB SHADOW E&M-EST. PATIENT-LVL IV: CPT | Mod: PBBFAC,,, | Performed by: PODIATRIST

## 2023-01-04 PROCEDURE — 1159F PR MEDICATION LIST DOCUMENTED IN MEDICAL RECORD: ICD-10-PCS | Mod: CPTII,S$GLB,, | Performed by: PODIATRIST

## 2023-01-04 PROCEDURE — 99213 OFFICE O/P EST LOW 20 MIN: CPT | Mod: S$GLB,,, | Performed by: PODIATRIST

## 2023-01-04 PROCEDURE — 1160F PR REVIEW ALL MEDS BY PRESCRIBER/CLIN PHARMACIST DOCUMENTED: ICD-10-PCS | Mod: CPTII,S$GLB,, | Performed by: PODIATRIST

## 2023-01-04 PROCEDURE — 99999 PR PBB SHADOW E&M-EST. PATIENT-LVL IV: ICD-10-PCS | Mod: PBBFAC,,, | Performed by: PODIATRIST

## 2023-01-04 NOTE — PROGRESS NOTES
Kiersten Panel Clean-up 01.2023 - Left message for patient to call our office. New primary care doctor needed. Please schedule.

## 2023-01-04 NOTE — PATIENT INSTRUCTIONS
Please keep football dressing clean, dry, and intact.  If dressing gets wet please contact our office.    Wear special shoe every time foot is placed on the floor.    Elevate affected foot as much as possible

## 2023-01-04 NOTE — PROGRESS NOTES
Subjective:      Patient ID: Catalino Mcfadden is a 57 y.o. male.    Chief Complaint: Diabetes Mellitus and Foot Ulcer    Catalino is a 57 y.o. male who presents to the clinic for evaluation and treatment of high risk feet. Catalino has a past medical history of CKD (chronic kidney disease), stage III (07/16/2020), CKD stage 3 due to type 1 diabetes mellitus, CKD stage 3 due to type 2 diabetes mellitus, Diabetes mellitus, type 2, Diabetic foot ulcer associated with diabetes mellitus due to underlying condition (07/16/2020), Diabetic foot ulcers, Edema (08/03/2020), Hyperlipidemia, Hypertension, and Obese. Reports new onset left heel blister x several days after a sock rubbed on the left heel. Seen in ED on 11/7/22.     11/16/2022 returns to clinic for follow up of left heel wound.  Seen by my colleague last week, cultures obtained (NGTD) and patient placed on PO abx.  He has kept dressing intact.     11/23/22: F/u left heel ulceration. Presents with calamine coflex left foot.     11/30/22: F/u left heel ulceration. Patient reports going to work this week.     12/14/22: F/u left heel ulceration. Presents in calamine coflex wrap.     12/21/22: F/u left heel ulceration. Presents in calamine coflex wrap.     12/27/22: F/u left heel ulceration. Presents in calamine coflex wrap. No new pedal complaints, continues to work     01/04/23: F/u left heel ulceration. Presents in calamine coflex wrap. No new pedal complaints, continues to work.  Continues to have hiccups      PCP: Azikiwe K Lombard, MD    Date Last Seen by PCP:   Chief Complaint   Patient presents with    Diabetes Mellitus    Foot Ulcer           Hemoglobin A1C   Date Value Ref Range Status   02/23/2022 5.0 4.0 - 5.6 % Final     Comment:     ADA Screening Guidelines:  5.7-6.4%  Consistent with prediabetes  >or=6.5%  Consistent with diabetes    High levels of fetal hemoglobin interfere with the HbA1C  assay. Heterozygous hemoglobin variants (HbS, HgC, etc)do  not  "significantly interfere with this assay.   However, presence of multiple variants may affect accuracy.     02/09/2022 5.0 4.0 - 5.6 % Final     Comment:     ADA Screening Guidelines:  5.7-6.4%  Consistent with prediabetes  >or=6.5%  Consistent with diabetes    High levels of fetal hemoglobin interfere with the HbA1C  assay. Heterozygous hemoglobin variants (HbS, HgC, etc)do  not significantly interfere with this assay.   However, presence of multiple variants may affect accuracy.     07/28/2021 5.7 (H) 4.0 - 5.6 % Final     Comment:     ADA Screening Guidelines:  5.7-6.4%  Consistent with prediabetes  >or=6.5%  Consistent with diabetes    High levels of fetal hemoglobin interfere with the HbA1C  assay. Heterozygous hemoglobin variants (HbS, HgC, etc)do  not significantly interfere with this assay.   However, presence of multiple variants may affect accuracy.         Review of Systems   Constitutional: Negative for chills.   Cardiovascular:  Negative for chest pain and claudication.   Respiratory:  Negative for cough.    Skin:  Positive for color change, dry skin and nail changes.   Musculoskeletal:  Positive for joint pain.   Gastrointestinal:  Negative for nausea.   Neurological:  Positive for paresthesias. Negative for numbness.   Psychiatric/Behavioral:  The patient is not nervous/anxious.          Objective:      Vitals:    01/04/23 0717   Weight: 108 kg (238 lb 1.6 oz)   Height: 6' 1" (1.854 m)   PainSc: 0-No pain       Physical Exam  Vitals and nursing note reviewed.   Constitutional:       General: He is not in acute distress.     Appearance: He is not toxic-appearing or diaphoretic.      Comments: Pt. is well-developed, well-nourished, appears stated age, in no acute distress, alert and oriented x 3. No evidence of depression, anxiety, or agitation. Calm, cooperative, and communicative. Appropriate interactions and affect.   Cardiovascular:      Pulses:           Dorsalis pedis pulses are 2+ on the right " side and 2+ on the left side.        Posterior tibial pulses are 1+ on the right side and 1+ on the left side.      Comments: There is decreased digital hair. Skin is atrophic, slightly hyperpigmented  Pulmonary:      Effort: No respiratory distress.   Musculoskeletal:      Right lower leg: Edema present.      Left lower leg: Edema present.      Right ankle: Swelling present. No tenderness. No lateral malleolus, medial malleolus, AITF ligament, CF ligament or posterior TF ligament tenderness.      Right Achilles Tendon: No defects. Hilliard's test negative.      Left ankle: Swelling present. No tenderness. No lateral malleolus, medial malleolus, AITF ligament, CF ligament or posterior TF ligament tenderness.      Left Achilles Tendon: No defects. Hilliard's test negative.      Right foot: No tenderness or bony tenderness.      Left foot: No tenderness or bony tenderness.      Comments: Decreased stride, station of gait.  apropulsive toe off.  Increased angle and base of gait.    There is equinus deformity bilateral with decreased dorsiflexion at the ankle joint bilateral. No tenderness with compression of heel. Negative tinels sign. Gait analysis reveals excessive pronation through midstance and propulsion with early heel off.     Patient has hammertoes of digits 2-5 bilateral partially reducible     Decreased first MPJ range of motion both weightbearing and nonweightbearing, no crepitus observed the first MP joint, + dorsal flag sign.     Visible and palpable bunion without pain at dorsomedial 1st metatarsal head right and left.  Hallux abducted right and left partially reducible, tracks laterally without being track bound.  No ecchymosis, erythema, edema, or cardinal signs infection or signs of trauma same foot.    Fat pad atrophy to heels and met heads bilateral    Plantarflexed 1st ray RLE   Lymphadenopathy:      Comments: No lymphatic streaking    Negative lymphadenopathy bilateral popliteal fossa and tarsal  tunnel.     Skin:     General: Skin is warm and dry.      Coloration: Skin is not pale.      Findings: Lesion (see wound descriptions below) present. No abrasion, ecchymosis, laceration or rash.      Nails: There is no clubbing.      Comments: Toenails 1-5 bilaterally discolored/yellowed, dystrophic, brittle with subungual debris.   Superficial abrasion left dorsal lateral foot.    Neurological:      Sensory: Sensory deficit present.      Comments:  [place] 5.07 monofilamant testing is diminished Kp feet. Decreased/absent vibratory sensation bilateral feet to 128Hz tuning fork.     Paresthesias, and hyperesthesia bilateral feet with no clearly identified trigger or source.           Psychiatric:         Attention and Perception: He is attentive.         Mood and Affect: Mood is not anxious. Affect is not inappropriate.         Speech: He is communicative. Speech is not slurred.         Behavior: Behavior is not combative.     01/04/23:        12/27/22:         12/21/22:          11/30/22 11/16/2022    Some areas of deeper pressure noted but otherwise stable            11/9/22:  S/p drainage left heel bullae formation serous drainage noted.             Assessment:       Encounter Diagnoses   Name Primary?    Heel ulcer, left, with unspecified severity Yes    Type 2 diabetes mellitus with stage 5 chronic kidney disease                    Plan:       Catalino was seen today for diabetes mellitus and foot ulcer.    Diagnoses and all orders for this visit:    Heel ulcer, left, with unspecified severity    Type 2 diabetes mellitus with stage 5 chronic kidney disease            I counseled the patient on his conditions, their implications and medical management.    Education about the prevention of limb loss.    Discussed wound healing cycle, skin integrity, ways to care for skin.Counseled patient on the effects of biomechanical pressure on healing. He verbalizes understanding that it can increase the chances  of delayed healing and this prolonged exposure leads to infection or progression of infection which subsequently can result in loss of limb.    Adequate vitamin supplementation, protein intake, and hydration - discussed with patient    The wound is cleansed of foreign material as much as possible and the base inspected for bone or abscess.  Base is healthy, bleeding, granular and without bone nor joint exposure     Improved    Dressings: collagen, calamine coflex compression wrap applied to LLE.  Well tolerated  Offloading: football and darco shoe    Follow-up: 1 week but should call [facility] immediately if any signs of infection, such as fever, chills, sweats, increased redness or pain.    Short-term goals include maintaining good offloading and minimizing bioburden, promoting granulation and epithelialization to healing.  Long-term goals include keeping the wound healed by good offloading and medical management under the direction of internist.    Shoe inspection. Diabetic Foot Education. Patient reminded of the importance of good nutrition and blood sugar control to help prevent podiatric complications of diabetes. Patient instructed on proper foot hygeine. We discussed wearing proper shoe gear, daily foot inspections, never walking without protective shoe gear, never putting sharp instruments to feet.

## 2023-01-11 ENCOUNTER — OFFICE VISIT (OUTPATIENT)
Dept: PODIATRY | Facility: CLINIC | Age: 58
End: 2023-01-11
Payer: COMMERCIAL

## 2023-01-11 VITALS — HEIGHT: 73 IN | WEIGHT: 238 LBS | BODY MASS INDEX: 31.54 KG/M2

## 2023-01-11 DIAGNOSIS — L97.429 HEEL ULCER, LEFT, WITH UNSPECIFIED SEVERITY: Primary | ICD-10-CM

## 2023-01-11 DIAGNOSIS — E11.22 TYPE 2 DIABETES MELLITUS WITH STAGE 5 CHRONIC KIDNEY DISEASE: ICD-10-CM

## 2023-01-11 DIAGNOSIS — E08.621 DIABETIC ULCER OF LEFT MIDFOOT ASSOCIATED WITH DIABETES MELLITUS DUE TO UNDERLYING CONDITION, WITH FAT LAYER EXPOSED: ICD-10-CM

## 2023-01-11 DIAGNOSIS — N18.5 TYPE 2 DIABETES MELLITUS WITH STAGE 5 CHRONIC KIDNEY DISEASE: ICD-10-CM

## 2023-01-11 DIAGNOSIS — L97.422 DIABETIC ULCER OF LEFT MIDFOOT ASSOCIATED WITH DIABETES MELLITUS DUE TO UNDERLYING CONDITION, WITH FAT LAYER EXPOSED: ICD-10-CM

## 2023-01-11 DIAGNOSIS — E11.49 TYPE II DIABETES MELLITUS WITH NEUROLOGICAL MANIFESTATIONS: ICD-10-CM

## 2023-01-11 PROCEDURE — 3008F PR BODY MASS INDEX (BMI) DOCUMENTED: ICD-10-PCS | Mod: CPTII,S$GLB,, | Performed by: PODIATRIST

## 2023-01-11 PROCEDURE — 1159F PR MEDICATION LIST DOCUMENTED IN MEDICAL RECORD: ICD-10-PCS | Mod: CPTII,S$GLB,, | Performed by: PODIATRIST

## 2023-01-11 PROCEDURE — 1159F MED LIST DOCD IN RCRD: CPT | Mod: CPTII,S$GLB,, | Performed by: PODIATRIST

## 2023-01-11 PROCEDURE — 99213 OFFICE O/P EST LOW 20 MIN: CPT | Mod: S$GLB,,, | Performed by: PODIATRIST

## 2023-01-11 PROCEDURE — 3008F BODY MASS INDEX DOCD: CPT | Mod: CPTII,S$GLB,, | Performed by: PODIATRIST

## 2023-01-11 PROCEDURE — 1160F RVW MEDS BY RX/DR IN RCRD: CPT | Mod: CPTII,S$GLB,, | Performed by: PODIATRIST

## 2023-01-11 PROCEDURE — 99999 PR PBB SHADOW E&M-EST. PATIENT-LVL III: ICD-10-PCS | Mod: PBBFAC,,, | Performed by: PODIATRIST

## 2023-01-11 PROCEDURE — 99999 PR PBB SHADOW E&M-EST. PATIENT-LVL III: CPT | Mod: PBBFAC,,, | Performed by: PODIATRIST

## 2023-01-11 PROCEDURE — 99213 PR OFFICE/OUTPT VISIT, EST, LEVL III, 20-29 MIN: ICD-10-PCS | Mod: S$GLB,,, | Performed by: PODIATRIST

## 2023-01-11 PROCEDURE — 1160F PR REVIEW ALL MEDS BY PRESCRIBER/CLIN PHARMACIST DOCUMENTED: ICD-10-PCS | Mod: CPTII,S$GLB,, | Performed by: PODIATRIST

## 2023-01-12 NOTE — PROGRESS NOTES
Subjective:      Patient ID: Catalino Mcfadden is a 57 y.o. male.    Chief Complaint: Diabetes Mellitus, Wound Check, and Follow-up    Catalino is a 57 y.o. male who presents to the clinic for evaluation and treatment of high risk feet. Catalino has a past medical history of CKD (chronic kidney disease), stage III (07/16/2020), CKD stage 3 due to type 1 diabetes mellitus, CKD stage 3 due to type 2 diabetes mellitus, Diabetes mellitus, type 2, Diabetic foot ulcer associated with diabetes mellitus due to underlying condition (07/16/2020), Diabetic foot ulcers, Edema (08/03/2020), Hyperlipidemia, Hypertension, and Obese. Reports new onset left heel blister x several days after a sock rubbed on the left heel. Seen in ED on 11/7/22.     11/16/2022 returns to clinic for follow up of left heel wound.  Seen by my colleague last week, cultures obtained (NGTD) and patient placed on PO abx.  He has kept dressing intact.     11/23/22: F/u left heel ulceration. Presents with calamine coflex left foot.     11/30/22: F/u left heel ulceration. Patient reports going to work this week.     12/14/22: F/u left heel ulceration. Presents in calamine coflex wrap.     12/21/22: F/u left heel ulceration. Presents in calamine coflex wrap.     12/27/22: F/u left heel ulceration. Presents in calamine coflex wrap. No new pedal complaints, continues to work     01/04/23: F/u left heel ulceration. Presents in calamine coflex wrap. No new pedal complaints, continues to work.  Continues to have hiccups      01/11/23: F/u left heel ulceration. Presents in calamine coflex wrap which appears to have padding which shifted medially. No new pedal complaints, continues to work.  Continues to have hiccups    PCP: Azikiwe K Lombard, MD (Inactive)    Date Last Seen by PCP:   Chief Complaint   Patient presents with    Diabetes Mellitus    Wound Check    Follow-up           Hemoglobin A1C   Date Value Ref Range Status   02/23/2022 5.0 4.0 - 5.6 % Final     Comment:      "ADA Screening Guidelines:  5.7-6.4%  Consistent with prediabetes  >or=6.5%  Consistent with diabetes    High levels of fetal hemoglobin interfere with the HbA1C  assay. Heterozygous hemoglobin variants (HbS, HgC, etc)do  not significantly interfere with this assay.   However, presence of multiple variants may affect accuracy.     02/09/2022 5.0 4.0 - 5.6 % Final     Comment:     ADA Screening Guidelines:  5.7-6.4%  Consistent with prediabetes  >or=6.5%  Consistent with diabetes    High levels of fetal hemoglobin interfere with the HbA1C  assay. Heterozygous hemoglobin variants (HbS, HgC, etc)do  not significantly interfere with this assay.   However, presence of multiple variants may affect accuracy.     07/28/2021 5.7 (H) 4.0 - 5.6 % Final     Comment:     ADA Screening Guidelines:  5.7-6.4%  Consistent with prediabetes  >or=6.5%  Consistent with diabetes    High levels of fetal hemoglobin interfere with the HbA1C  assay. Heterozygous hemoglobin variants (HbS, HgC, etc)do  not significantly interfere with this assay.   However, presence of multiple variants may affect accuracy.         Review of Systems   Constitutional: Negative for chills.   Cardiovascular:  Negative for chest pain and claudication.   Respiratory:  Negative for cough.    Skin:  Positive for color change, dry skin and nail changes.   Musculoskeletal:  Positive for joint pain.   Gastrointestinal:  Negative for nausea.   Neurological:  Positive for paresthesias. Negative for numbness.   Psychiatric/Behavioral:  The patient is not nervous/anxious.          Objective:      Vitals:    01/11/23 0729   Weight: 108 kg (238 lb)   Height: 6' 1" (1.854 m)   PainSc: 0-No pain       Physical Exam  Vitals and nursing note reviewed.   Constitutional:       General: He is not in acute distress.     Appearance: He is not toxic-appearing or diaphoretic.      Comments: Pt. is well-developed, well-nourished, appears stated age, in no acute distress, alert and " oriented x 3. No evidence of depression, anxiety, or agitation. Calm, cooperative, and communicative. Appropriate interactions and affect.   Cardiovascular:      Pulses:           Dorsalis pedis pulses are 2+ on the right side and 2+ on the left side.        Posterior tibial pulses are 1+ on the right side and 1+ on the left side.      Comments: There is decreased digital hair. Skin is atrophic, slightly hyperpigmented  Pulmonary:      Effort: No respiratory distress.   Musculoskeletal:      Right lower leg: Edema present.      Left lower leg: Edema present.      Right ankle: Swelling present. No tenderness. No lateral malleolus, medial malleolus, AITF ligament, CF ligament or posterior TF ligament tenderness.      Right Achilles Tendon: No defects. Hilliard's test negative.      Left ankle: Swelling present. No tenderness. No lateral malleolus, medial malleolus, AITF ligament, CF ligament or posterior TF ligament tenderness.      Left Achilles Tendon: No defects. Hilliard's test negative.      Right foot: No tenderness or bony tenderness.      Left foot: No tenderness or bony tenderness.      Comments: Decreased stride, station of gait.  apropulsive toe off.  Increased angle and base of gait.    There is equinus deformity bilateral with decreased dorsiflexion at the ankle joint bilateral. No tenderness with compression of heel. Negative tinels sign. Gait analysis reveals excessive pronation through midstance and propulsion with early heel off.     Patient has hammertoes of digits 2-5 bilateral partially reducible     Decreased first MPJ range of motion both weightbearing and nonweightbearing, no crepitus observed the first MP joint, + dorsal flag sign.     Visible and palpable bunion without pain at dorsomedial 1st metatarsal head right and left.  Hallux abducted right and left partially reducible, tracks laterally without being track bound.  No ecchymosis, erythema, edema, or cardinal signs infection or signs of  trauma same foot.    Fat pad atrophy to heels and met heads bilateral    Plantarflexed 1st ray RLE   Lymphadenopathy:      Comments: No lymphatic streaking    Negative lymphadenopathy bilateral popliteal fossa and tarsal tunnel.     Skin:     General: Skin is warm and dry.      Coloration: Skin is not pale.      Findings: Abrasion (left lateral midfoot) and lesion (see wound descriptions below) present. No ecchymosis, laceration or rash.      Nails: There is no clubbing.      Comments: Toenails 1-5 bilaterally discolored/yellowed, dystrophic, brittle with subungual debris.   Superficial abrasion left dorsal lateral foot.    Neurological:      Sensory: Sensory deficit present.      Comments:  Jamul-Dayton 5.07 monofilamant testing is diminished Kp feet. Decreased/absent vibratory sensation bilateral feet to 128Hz tuning fork.     Paresthesias, and hyperesthesia bilateral feet with no clearly identified trigger or source.           Psychiatric:         Attention and Perception: He is attentive.         Mood and Affect: Mood is not anxious. Affect is not inappropriate.         Speech: He is communicative. Speech is not slurred.         Behavior: Behavior is not combative.     01/11/23:        01/04/23:        12/27/22:         12/21/22:          11/30/22 11/16/2022    Some areas of deeper pressure noted but otherwise stable            11/9/22:  S/p drainage left heel bullae formation serous drainage noted.             Assessment:       Encounter Diagnoses   Name Primary?    Heel ulcer, left, with unspecified severity Yes    Diabetic ulcer of left midfoot associated with diabetes mellitus due to underlying condition, with fat layer exposed     Type 2 diabetes mellitus with stage 5 chronic kidney disease     Type II diabetes mellitus with neurological manifestations                    Plan:       Catalino was seen today for diabetes mellitus, wound check and follow-up.    Diagnoses and all orders for this  visit:    Heel ulcer, left, with unspecified severity    Diabetic ulcer of left midfoot associated with diabetes mellitus due to underlying condition, with fat layer exposed    Type 2 diabetes mellitus with stage 5 chronic kidney disease    Type II diabetes mellitus with neurological manifestations            I counseled the patient on his conditions, their implications and medical management.    Education about the prevention of limb loss.    Discussed wound healing cycle, skin integrity, ways to care for skin.Counseled patient on the effects of biomechanical pressure on healing. He verbalizes understanding that it can increase the chances of delayed healing and this prolonged exposure leads to infection or progression of infection which subsequently can result in loss of limb.    Adequate vitamin supplementation, protein intake, and hydration - discussed with patient    The wound is cleansed of foreign material as much as possible and the base inspected for bone or abscess.  Base is healthy, bleeding, granular and without bone nor joint exposure   Slight regression noted due to shifting padding, may be secondary to favoring lateral foot while elevating.    Dressings: iodosorb, calamine coflex compression wrap applied to LLE.  Well tolerated  Offloading: football and darco shoe    Follow-up: 1 week but should call [facility] immediately if any signs of infection, such as fever, chills, sweats, increased redness or pain.    Short-term goals include maintaining good offloading and minimizing bioburden, promoting granulation and epithelialization to healing.  Long-term goals include keeping the wound healed by good offloading and medical management under the direction of internist.    Shoe inspection. Diabetic Foot Education. Patient reminded of the importance of good nutrition and blood sugar control to help prevent podiatric complications of diabetes. Patient instructed on proper foot hygeine. We discussed wearing  proper shoe gear, daily foot inspections, never walking without protective shoe gear, never putting sharp instruments to feet.

## 2023-01-18 ENCOUNTER — OFFICE VISIT (OUTPATIENT)
Dept: PODIATRY | Facility: CLINIC | Age: 58
End: 2023-01-18
Payer: COMMERCIAL

## 2023-01-18 VITALS — HEIGHT: 73 IN | WEIGHT: 238.13 LBS | BODY MASS INDEX: 31.56 KG/M2

## 2023-01-18 DIAGNOSIS — E11.22 TYPE 2 DIABETES MELLITUS WITH STAGE 5 CHRONIC KIDNEY DISEASE: ICD-10-CM

## 2023-01-18 DIAGNOSIS — L97.429 HEEL ULCER, LEFT, WITH UNSPECIFIED SEVERITY: ICD-10-CM

## 2023-01-18 DIAGNOSIS — N18.5 TYPE 2 DIABETES MELLITUS WITH STAGE 5 CHRONIC KIDNEY DISEASE: ICD-10-CM

## 2023-01-18 DIAGNOSIS — L97.422 DIABETIC ULCER OF LEFT MIDFOOT ASSOCIATED WITH DIABETES MELLITUS DUE TO UNDERLYING CONDITION, WITH FAT LAYER EXPOSED: Primary | ICD-10-CM

## 2023-01-18 DIAGNOSIS — E08.621 DIABETIC ULCER OF LEFT MIDFOOT ASSOCIATED WITH DIABETES MELLITUS DUE TO UNDERLYING CONDITION, WITH FAT LAYER EXPOSED: Primary | ICD-10-CM

## 2023-01-18 PROCEDURE — 3008F BODY MASS INDEX DOCD: CPT | Mod: CPTII,S$GLB,, | Performed by: PODIATRIST

## 2023-01-18 PROCEDURE — 1159F PR MEDICATION LIST DOCUMENTED IN MEDICAL RECORD: ICD-10-PCS | Mod: CPTII,S$GLB,, | Performed by: PODIATRIST

## 2023-01-18 PROCEDURE — 1160F PR REVIEW ALL MEDS BY PRESCRIBER/CLIN PHARMACIST DOCUMENTED: ICD-10-PCS | Mod: CPTII,S$GLB,, | Performed by: PODIATRIST

## 2023-01-18 PROCEDURE — 1160F RVW MEDS BY RX/DR IN RCRD: CPT | Mod: CPTII,S$GLB,, | Performed by: PODIATRIST

## 2023-01-18 PROCEDURE — 1159F MED LIST DOCD IN RCRD: CPT | Mod: CPTII,S$GLB,, | Performed by: PODIATRIST

## 2023-01-18 PROCEDURE — 3008F PR BODY MASS INDEX (BMI) DOCUMENTED: ICD-10-PCS | Mod: CPTII,S$GLB,, | Performed by: PODIATRIST

## 2023-01-18 PROCEDURE — 99213 PR OFFICE/OUTPT VISIT, EST, LEVL III, 20-29 MIN: ICD-10-PCS | Mod: S$GLB,,, | Performed by: PODIATRIST

## 2023-01-18 PROCEDURE — 99999 PR PBB SHADOW E&M-EST. PATIENT-LVL IV: CPT | Mod: PBBFAC,,, | Performed by: PODIATRIST

## 2023-01-18 PROCEDURE — 99213 OFFICE O/P EST LOW 20 MIN: CPT | Mod: S$GLB,,, | Performed by: PODIATRIST

## 2023-01-18 PROCEDURE — 99999 PR PBB SHADOW E&M-EST. PATIENT-LVL IV: ICD-10-PCS | Mod: PBBFAC,,, | Performed by: PODIATRIST

## 2023-01-19 NOTE — PROGRESS NOTES
Subjective:      Patient ID: Catalino Mcfadden is a 57 y.o. male.    Chief Complaint: Foot Ulcer    Catalino is a 57 y.o. male who presents to the clinic for evaluation and treatment of high risk feet. Catalino has a past medical history of CKD (chronic kidney disease), stage III (07/16/2020), CKD stage 3 due to type 1 diabetes mellitus, CKD stage 3 due to type 2 diabetes mellitus, Diabetes mellitus, type 2, Diabetic foot ulcer associated with diabetes mellitus due to underlying condition (07/16/2020), Diabetic foot ulcers, Edema (08/03/2020), Hyperlipidemia, Hypertension, and Obese. Reports new onset left heel blister x several days after a sock rubbed on the left heel. Seen in ED on 11/7/22.     11/16/2022 returns to clinic for follow up of left heel wound.  Seen by my colleague last week, cultures obtained (NGTD) and patient placed on PO abx.  He has kept dressing intact.     11/23/22: F/u left heel ulceration. Presents with calamine coflex left foot.     11/30/22: F/u left heel ulceration. Patient reports going to work this week.     12/14/22: F/u left heel ulceration. Presents in calamine coflex wrap.     12/21/22: F/u left heel ulceration. Presents in calamine coflex wrap.     12/27/22: F/u left heel ulceration. Presents in calamine coflex wrap. No new pedal complaints, continues to work     01/04/23: F/u left heel ulceration. Presents in calamine coflex wrap. No new pedal complaints, continues to work.  Continues to have hiccups      01/11/23: F/u left heel ulceration. Presents in calamine coflex wrap which appears to have padding which shifted medially. No new pedal complaints, continues to work.  Continues to have hiccups    01/18/23: F/u left heel ulceration. Presents in intact calamine coflex wrap. No new pedal complaints.    PCP: Azikiwe K Lombard, MD    Date Last Seen by PCP:   Chief Complaint   Patient presents with    Foot Ulcer           Hemoglobin A1C   Date Value Ref Range Status   02/23/2022 5.0 4.0 - 5.6 %  "Final     Comment:     ADA Screening Guidelines:  5.7-6.4%  Consistent with prediabetes  >or=6.5%  Consistent with diabetes    High levels of fetal hemoglobin interfere with the HbA1C  assay. Heterozygous hemoglobin variants (HbS, HgC, etc)do  not significantly interfere with this assay.   However, presence of multiple variants may affect accuracy.     02/09/2022 5.0 4.0 - 5.6 % Final     Comment:     ADA Screening Guidelines:  5.7-6.4%  Consistent with prediabetes  >or=6.5%  Consistent with diabetes    High levels of fetal hemoglobin interfere with the HbA1C  assay. Heterozygous hemoglobin variants (HbS, HgC, etc)do  not significantly interfere with this assay.   However, presence of multiple variants may affect accuracy.     07/28/2021 5.7 (H) 4.0 - 5.6 % Final     Comment:     ADA Screening Guidelines:  5.7-6.4%  Consistent with prediabetes  >or=6.5%  Consistent with diabetes    High levels of fetal hemoglobin interfere with the HbA1C  assay. Heterozygous hemoglobin variants (HbS, HgC, etc)do  not significantly interfere with this assay.   However, presence of multiple variants may affect accuracy.         Review of Systems   Constitutional: Negative for chills.   Cardiovascular:  Negative for chest pain and claudication.   Respiratory:  Negative for cough.    Skin:  Positive for color change, dry skin and nail changes.   Musculoskeletal:  Positive for joint pain.   Gastrointestinal:  Negative for nausea.   Neurological:  Positive for paresthesias. Negative for numbness.   Psychiatric/Behavioral:  The patient is not nervous/anxious.          Objective:      Vitals:    01/18/23 0709   Weight: 108 kg (238 lb 1.6 oz)   Height: 6' 1" (1.854 m)   PainSc: 0-No pain       Physical Exam  Vitals and nursing note reviewed.   Constitutional:       General: He is not in acute distress.     Appearance: He is not toxic-appearing or diaphoretic.      Comments: Pt. is well-developed, well-nourished, appears stated age, in no " acute distress, alert and oriented x 3. No evidence of depression, anxiety, or agitation. Calm, cooperative, and communicative. Appropriate interactions and affect.   Cardiovascular:      Pulses:           Dorsalis pedis pulses are 2+ on the right side and 2+ on the left side.        Posterior tibial pulses are 1+ on the right side and 1+ on the left side.      Comments: There is decreased digital hair. Skin is atrophic, slightly hyperpigmented  Pulmonary:      Effort: No respiratory distress.   Musculoskeletal:      Right lower leg: Edema present.      Left lower leg: Edema present.      Right ankle: Swelling present. No tenderness. No lateral malleolus, medial malleolus, AITF ligament, CF ligament or posterior TF ligament tenderness.      Right Achilles Tendon: No defects. Hilliard's test negative.      Left ankle: Swelling present. No tenderness. No lateral malleolus, medial malleolus, AITF ligament, CF ligament or posterior TF ligament tenderness.      Left Achilles Tendon: No defects. Hilliard's test negative.      Right foot: No tenderness or bony tenderness.      Left foot: No tenderness or bony tenderness.      Comments: Decreased stride, station of gait.  apropulsive toe off.  Increased angle and base of gait.    There is equinus deformity bilateral with decreased dorsiflexion at the ankle joint bilateral. No tenderness with compression of heel. Negative tinels sign. Gait analysis reveals excessive pronation through midstance and propulsion with early heel off.     Patient has hammertoes of digits 2-5 bilateral partially reducible     Decreased first MPJ range of motion both weightbearing and nonweightbearing, no crepitus observed the first MP joint, + dorsal flag sign.     Visible and palpable bunion without pain at dorsomedial 1st metatarsal head right and left.  Hallux abducted right and left partially reducible, tracks laterally without being track bound.  No ecchymosis, erythema, edema, or cardinal  signs infection or signs of trauma same foot.    Fat pad atrophy to heels and met heads bilateral    Plantarflexed 1st ray RLE   Lymphadenopathy:      Comments: No lymphatic streaking    Negative lymphadenopathy bilateral popliteal fossa and tarsal tunnel.     Skin:     General: Skin is warm and dry.      Coloration: Skin is not pale.      Findings: Abrasion (left lateral midfoot) and lesion (see wound descriptions below) present. No ecchymosis, laceration or rash.      Nails: There is no clubbing.      Comments: Toenails 1-5 bilaterally discolored/yellowed, dystrophic, brittle with subungual debris.   Superficial abrasion left dorsal lateral foot.    Neurological:      Sensory: Sensory deficit present.      Comments:  Baudette-Dayton 5.07 monofilamant testing is diminished Kp feet. Decreased/absent vibratory sensation bilateral feet to 128Hz tuning fork.     Paresthesias, and hyperesthesia bilateral feet with no clearly identified trigger or source.           Psychiatric:         Attention and Perception: He is attentive.         Mood and Affect: Mood is not anxious. Affect is not inappropriate.         Speech: He is communicative. Speech is not slurred.         Behavior: Behavior is not combative.     01/18/23:               01/11/23:        01/04/23:        12/27/22:         12/21/22:          11/30/22 11/16/2022    Some areas of deeper pressure noted but otherwise stable            11/9/22:  S/p drainage left heel bullae formation serous drainage noted.             Assessment:       Encounter Diagnoses   Name Primary?    Diabetic ulcer of left midfoot associated with diabetes mellitus due to underlying condition, with fat layer exposed Yes    Heel ulcer, left, with unspecified severity     Type 2 diabetes mellitus with stage 5 chronic kidney disease                    Plan:       Catalino was seen today for foot ulcer.    Diagnoses and all orders for this visit:    Diabetic ulcer of left midfoot associated  with diabetes mellitus due to underlying condition, with fat layer exposed    Heel ulcer, left, with unspecified severity    Type 2 diabetes mellitus with stage 5 chronic kidney disease            I counseled the patient on his conditions, their implications and medical management.    Education about the prevention of limb loss.    Discussed wound healing cycle, skin integrity, ways to care for skin.Counseled patient on the effects of biomechanical pressure on healing. He verbalizes understanding that it can increase the chances of delayed healing and this prolonged exposure leads to infection or progression of infection which subsequently can result in loss of limb.    Adequate vitamin supplementation, protein intake, and hydration - discussed with patient    The wound is cleansed of foreign material as much as possible and the base inspected for bone or abscess.      Wound has virtually healed but skin is thin in area of pressure    Dressings: collagen, calamine coflex compression wrap applied to LLE.  Well tolerated  Offloading: football and darco shoe    Follow-up: 1 week but should call [facility] immediately if any signs of infection, such as fever, chills, sweats, increased redness or pain.    Short-term goals include maintaining good offloading and minimizing bioburden, promoting granulation and epithelialization to healing.  Long-term goals include keeping the wound healed by good offloading and medical management under the direction of internist.    Shoe inspection. Diabetic Foot Education. Patient reminded of the importance of good nutrition and blood sugar control to help prevent podiatric complications of diabetes. Patient instructed on proper foot hygeine. We discussed wearing proper shoe gear, daily foot inspections, never walking without protective shoe gear, never putting sharp instruments to feet.

## 2023-01-23 ENCOUNTER — OFFICE VISIT (OUTPATIENT)
Dept: GASTROENTEROLOGY | Facility: CLINIC | Age: 58
End: 2023-01-23
Payer: COMMERCIAL

## 2023-01-23 VITALS
BODY MASS INDEX: 32.9 KG/M2 | SYSTOLIC BLOOD PRESSURE: 236 MMHG | WEIGHT: 248.25 LBS | HEIGHT: 73 IN | DIASTOLIC BLOOD PRESSURE: 132 MMHG | HEART RATE: 72 BPM

## 2023-01-23 DIAGNOSIS — R06.6 INTRACTABLE HICCUPS: Primary | ICD-10-CM

## 2023-01-23 PROCEDURE — 1159F PR MEDICATION LIST DOCUMENTED IN MEDICAL RECORD: ICD-10-PCS | Mod: CPTII,S$GLB,, | Performed by: INTERNAL MEDICINE

## 2023-01-23 PROCEDURE — 99999 PR PBB SHADOW E&M-EST. PATIENT-LVL IV: CPT | Mod: PBBFAC,,, | Performed by: INTERNAL MEDICINE

## 2023-01-23 PROCEDURE — 3080F PR MOST RECENT DIASTOLIC BLOOD PRESSURE >= 90 MM HG: ICD-10-PCS | Mod: CPTII,S$GLB,, | Performed by: INTERNAL MEDICINE

## 2023-01-23 PROCEDURE — 99214 PR OFFICE/OUTPT VISIT, EST, LEVL IV, 30-39 MIN: ICD-10-PCS | Mod: S$GLB,,, | Performed by: INTERNAL MEDICINE

## 2023-01-23 PROCEDURE — 99214 OFFICE O/P EST MOD 30 MIN: CPT | Mod: S$GLB,,, | Performed by: INTERNAL MEDICINE

## 2023-01-23 PROCEDURE — 3077F SYST BP >= 140 MM HG: CPT | Mod: CPTII,S$GLB,, | Performed by: INTERNAL MEDICINE

## 2023-01-23 PROCEDURE — 3008F PR BODY MASS INDEX (BMI) DOCUMENTED: ICD-10-PCS | Mod: CPTII,S$GLB,, | Performed by: INTERNAL MEDICINE

## 2023-01-23 PROCEDURE — 3077F PR MOST RECENT SYSTOLIC BLOOD PRESSURE >= 140 MM HG: ICD-10-PCS | Mod: CPTII,S$GLB,, | Performed by: INTERNAL MEDICINE

## 2023-01-23 PROCEDURE — 1159F MED LIST DOCD IN RCRD: CPT | Mod: CPTII,S$GLB,, | Performed by: INTERNAL MEDICINE

## 2023-01-23 PROCEDURE — 99999 PR PBB SHADOW E&M-EST. PATIENT-LVL IV: ICD-10-PCS | Mod: PBBFAC,,, | Performed by: INTERNAL MEDICINE

## 2023-01-23 PROCEDURE — 3008F BODY MASS INDEX DOCD: CPT | Mod: CPTII,S$GLB,, | Performed by: INTERNAL MEDICINE

## 2023-01-23 PROCEDURE — 3080F DIAST BP >= 90 MM HG: CPT | Mod: CPTII,S$GLB,, | Performed by: INTERNAL MEDICINE

## 2023-01-23 RX ORDER — CHLORPROMAZINE HYDROCHLORIDE 25 MG/1
25 TABLET, FILM COATED ORAL 3 TIMES DAILY
Qty: 42 TABLET | Refills: 0 | Status: SHIPPED | OUTPATIENT
Start: 2023-01-23 | End: 2023-06-28 | Stop reason: CLARIF

## 2023-01-23 RX ORDER — ESOMEPRAZOLE MAGNESIUM 40 MG/1
40 CAPSULE, DELAYED RELEASE ORAL
Qty: 60 CAPSULE | Refills: 11 | Status: ON HOLD | OUTPATIENT
Start: 2023-01-23 | End: 2023-12-05 | Stop reason: HOSPADM

## 2023-01-23 NOTE — PATIENT INSTRUCTIONS
Begin nexium 40mg BID  Trial of thorazine 25mg before meals for 2 weeks  Gastric emptying study  --Start with 1 a day before bedtime  --If tolerating without side effectsd, iincrease to with breakfast/lunch  --If tolerated can increase to TID    DO NOT TAKE BEFORE WORK IF OPERATING VEHICLES!

## 2023-01-23 NOTE — PROGRESS NOTES
[facility]   Clinic Note              Patient Name: Catalino Mcfadden  Age: 57 y.o.  Sex: male  MRN: [id number]    TODAY'S DATE:  1/23/2023  REFERRING PROVIDER:  No ref. provider found     Diagnosis:   1. Intractable hiccups        HPI:  Catalino Mcfadden is a 57 y.o. male with a history of HTN, HLD, obesity, NIDDM, ESRD on HD who was referred for evaluation and treatment of hiccups.    For review, patient has been seen by various providers at [facility] in the past year, most recently Dr. Yang last month.  At that time it was noted that patient was suffering from chronic belching as well as dysphagia to solids.  Recommendation was made to continue previously prescribed pantoprazole BID and baclofen TID and proceed with EGD.  This was done later that month with results as below; also was seen in ER and given reglan 5mg BID for hiccups.    Today patient notes persistent hiccups, and is hiccuping during the entirety of our interview.  He has taken Protonix, also trialed over-the-counter Prilosec in the past without effect.  He has taken Reglan without effect.  At present gabapentin has not helped.    PRIOR ENDOSCOPY:  -Colonoscopy: --    -EGD - 12/16/23:  Impression:            - Normal esophagus. Biopsied.                          - A medium amount of food (residue) in the stomach.                          - Normal stomach. Biopsied.                          - Normal examined duodenum.   Recommendation:        - Discharge patient to home.                          - Resume previous diet.                          - Continue present medications.                          - Await pathology results.                          - Return to GI clinic at appointment to be                          scheduled.   Path:  1. Gastric, biopsy:   Gastric mucosa with mild chronic gastritis   No Helicobacter pylori organisms identified on routine stain   2. Distal esophagus, biopsy:   Gastroesophageal junctional mucosa with mild chronic  inflammation   No intraepithelial eosinophils identified in the squamous component   Negative for intestinal metaplasia, dysplasia, or malignancy      ROS: Negative other than above      Review of patient's allergies indicates:  No Known Allergies    Outpatient Medications Marked as Taking for the 1/23/23 encounter (Office Visit) with Eren Francois MD   Medication Sig Dispense Refill    heparin sod,pork in 0.45% NaCl (HEPARIN, PORCINE, 100 UNITS) 100 unit/100 mL (1 unit/mL) SolP in sodium chloride 0.45 % 100 mL infusion Heparin Sodium (Porcine) 1,000 Units/mL Systemic      methoxy peg-epoetin beta (MIRCERA INJ) 50 mcg.      NIFEdipine (PROCARDIA XL) 60 MG (OSM) 24 hr tablet Take 1 tablet (60 mg total) by mouth once daily. 30 tablet 11    sodium chloride 0.9% SolP 100 mL with iron sucrose 100 mg iron/5 mL Soln 100 mg 50 mg.         Past Medical History:   Diagnosis Date    CKD (chronic kidney disease), stage III 07/16/2020    CKD stage 3 due to type 1 diabetes mellitus     CKD stage 3 due to type 2 diabetes mellitus     Diabetes mellitus, type 2     Diabetic foot ulcer associated with diabetes mellitus due to underlying condition 07/16/2020    Diabetic foot ulcers     Edema 08/03/2020    generalized, including genital area    Hyperlipidemia     Hypertension     Obese        Past Surgical History:   Procedure Laterality Date    BONE BIOPSY Left 7/17/2020    Procedure: BIOPSY, BONE;  Surgeon: Verona Whitmore DPM;  Location: [facility] OR;  Service: Podiatry;  Laterality: Left;    CERVICAL DISC SURGERY  07/11/2016    DEBRIDEMENT Left 7/17/2020    Procedure: DEBRIDEMENT;  Surgeon: Verona Whitmore DPM;  Location: [facility] OR;  Service: Podiatry;  Laterality: Left;    DEBRIDEMENT OF FOOT Right     toes & plantar surface    ESOPHAGOGASTRODUODENOSCOPY N/A 12/16/2022    Procedure: EGD (ESOPHAGOGASTRODUODENOSCOPY);  Surgeon: Eren Francois MD;  Location: [facility];  Service: Endoscopy;  Laterality: N/A;  Pt. on Dialysis. K+  ordered prior to procedure.EC    FRACTURE SURGERY Right     medial ankle, metal plate present    INSERTION OF TUNNELED CENTRAL VENOUS CATHETER (CVC) WITH SUBCUTANEOUS PORT N/A 6/11/2021    Procedure: TUNNEL CATH INSERTION WITHOUT PORT;  Surgeon: Jose Manuel Diagnostic Provider;  Location: [facility] OR;  Service: Radiology;  Laterality: N/A;  11AM START---PHONE PREOP 6/10/21---COVID POSTIVE ON 7/2020---NO S/S    left leg orthopedic surgery Left        Family History   Problem Relation Age of Onset    Heart disease Father     Colon cancer Neg Hx     Esophageal cancer Neg Hx        Social History     Socioeconomic History    Marital status: Other    Number of children: 1   Tobacco Use    Smoking status: Some Days     Types: Cigars    Smokeless tobacco: Never   Substance and Sexual Activity    Alcohol use: Yes     Comment: social    Drug use: No    Sexual activity: Not Currently   Social History Narrative    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     Social Determinants of Health     Financial Resource Strain: Low Risk     Difficulty of Paying Living Expenses: Not very hard   Food Insecurity: No Food Insecurity    Worried About Running Out of Food in the Last Year: Never true    Ran Out of Food in the Last Year: Never true   Transportation Needs: No Transportation Needs    Lack of Transportation (Medical): No    Lack of Transportation (Non-Medical): No   Physical Activity: Unknown    Days of Exercise per Week: Patient refused    Minutes of Exercise per Session: Patient refused   Stress: Stress Concern Present    Feeling of Stress : To some extent   Social Connections: Moderately Integrated    Frequency of Communication with Friends and Family: Three times a week    Frequency of Social Gatherings with Friends and Family: Three times a week    Attends Anabaptist Services: 1 to 4 times per year    Active Member of Clubs or Organizations: No    Attends Club or Organization Meetings: Never    Marital Status:    Housing Stability: Low Risk      "Unable to Pay for Housing in the Last Year: No    Number of Places Lived in the Last Year: 1    Unstable Housing in the Last Year: No         Vital Signs:  BP (!) 236/132   Pulse 72   Ht 6' 1" (1.854 m)   Wt 112.6 kg (248 lb 3.8 oz)   BMI 32.75 kg/m²      General: Awoke and orientedx3, in no acute distress  HEENT: Normocephalic, atruamatic, Moist mucous membranes  CV: Regular rate and rhythm, no JVD  Pulm: Normal inspiratory effort, no audible wheezing  Abdomen: Soft, nontender, nondistended abdomen without rebound or guarding  Extremities: No clubbing, cyanosis or edema  Psych: Normal affect. Good eye contact     Labs:   Lab Results   Component Value Date    CRP 8.5 (H) 07/23/2021     Lab Results   Component Value Date    HEPBSAG Negative 02/23/2022    HEPBCAB Negative 02/23/2022     Lab Results   Component Value Date    TBGOLDPLUS Negative 02/23/2022     Lab Results   Component Value Date    GJLICJAH57BO 12 (L) 02/09/2022     Lab Results   Component Value Date    WBC 7.29 11/21/2022    HGB 11.5 (L) 11/21/2022    HCT 34.2 (L) 11/21/2022    MCV 86 11/21/2022     (L) 11/21/2022     Lab Results   Component Value Date    CREATININE 5.0 (H) 11/21/2022    ALBUMIN 3.0 (L) 11/21/2022    BILITOT 0.7 11/21/2022    ALKPHOS 223 (H) 11/21/2022    AST 48 (H) 11/21/2022    ALT 25 11/21/2022       Assessment/Plan:  Catalino Mcfadden is a 57 y.o. male with a history of HTN, HLD, obesity, NIDDM, ESRD on HD who was referred for evaluation and treatment of hiccups.    # Intractable hiccups [R06.6]    Coming up on 6 months of persistent symptoms despite trials of PPI, Reglan, gabapentin, and various home remedies.  He sounds to have been in the emergency department in the past with modest improvement with a GI cocktail, and plan for Thorazine injection however this does not appear to have been completed.    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  I advised he begin with 25 mg when he gets home in the morning before bedtime.  If tolerated he can try 25 mg b.i.d. with the 2nd dose when he wakes up, and if no grogginess titrate up to t.i.d..  I will also rotate his PPI from Prilosec 40 mg b.i.d. to Nexium 40 mg b.i.d., in order a gastric emptying study to evaluate for gastroparesis as the etiology of his symptoms.    -- Trial of thorazine; educated on slowly introducing and monitoring for side effects  -- Nexium 40mg BID  -- gastric emptying study    * although patient denied headache, chest pain, or confusion, his blood pressure was noted to be 236/132.  I advised him to go to the emergency department for management of hypertensive urgency.  He stated that he had not taken his antihypertensives this morning, and will take them when he leaves clinic.  I again recommended he go to the emergency department for management of hypertensive urgency.*    RTC 3-6 mo    Thank you for involving us in the care of this patient.        Eren Francois MD  Department of Gastroenterology & Hepatology

## 2023-01-25 ENCOUNTER — OFFICE VISIT (OUTPATIENT)
Dept: PODIATRY | Facility: CLINIC | Age: 58
End: 2023-01-25
Payer: COMMERCIAL

## 2023-01-25 VITALS — WEIGHT: 248 LBS | HEIGHT: 73 IN | BODY MASS INDEX: 32.87 KG/M2

## 2023-01-25 DIAGNOSIS — E11.49 TYPE II DIABETES MELLITUS WITH NEUROLOGICAL MANIFESTATIONS: ICD-10-CM

## 2023-01-25 DIAGNOSIS — L97.512 FOOT ULCER, RIGHT, WITH FAT LAYER EXPOSED: ICD-10-CM

## 2023-01-25 DIAGNOSIS — N18.5 TYPE 2 DIABETES MELLITUS WITH STAGE 5 CHRONIC KIDNEY DISEASE: Primary | ICD-10-CM

## 2023-01-25 DIAGNOSIS — Z87.2 HEALED ULCER OF LEFT FOOT ON EXAMINATION: ICD-10-CM

## 2023-01-25 DIAGNOSIS — E11.22 TYPE 2 DIABETES MELLITUS WITH STAGE 5 CHRONIC KIDNEY DISEASE: Primary | ICD-10-CM

## 2023-01-25 PROCEDURE — 99214 PR OFFICE/OUTPT VISIT, EST, LEVL IV, 30-39 MIN: ICD-10-PCS | Mod: S$GLB,,, | Performed by: PODIATRIST

## 2023-01-25 PROCEDURE — 99214 OFFICE O/P EST MOD 30 MIN: CPT | Mod: S$GLB,,, | Performed by: PODIATRIST

## 2023-01-25 PROCEDURE — 99999 PR PBB SHADOW E&M-EST. PATIENT-LVL IV: ICD-10-PCS | Mod: PBBFAC,,, | Performed by: PODIATRIST

## 2023-01-25 PROCEDURE — 1160F PR REVIEW ALL MEDS BY PRESCRIBER/CLIN PHARMACIST DOCUMENTED: ICD-10-PCS | Mod: CPTII,S$GLB,, | Performed by: PODIATRIST

## 2023-01-25 PROCEDURE — 99999 PR PBB SHADOW E&M-EST. PATIENT-LVL IV: CPT | Mod: PBBFAC,,, | Performed by: PODIATRIST

## 2023-01-25 PROCEDURE — 1159F PR MEDICATION LIST DOCUMENTED IN MEDICAL RECORD: ICD-10-PCS | Mod: CPTII,S$GLB,, | Performed by: PODIATRIST

## 2023-01-25 PROCEDURE — 3008F BODY MASS INDEX DOCD: CPT | Mod: CPTII,S$GLB,, | Performed by: PODIATRIST

## 2023-01-25 PROCEDURE — 1159F MED LIST DOCD IN RCRD: CPT | Mod: CPTII,S$GLB,, | Performed by: PODIATRIST

## 2023-01-25 PROCEDURE — 1160F RVW MEDS BY RX/DR IN RCRD: CPT | Mod: CPTII,S$GLB,, | Performed by: PODIATRIST

## 2023-01-25 PROCEDURE — 3008F PR BODY MASS INDEX (BMI) DOCUMENTED: ICD-10-PCS | Mod: CPTII,S$GLB,, | Performed by: PODIATRIST

## 2023-02-01 ENCOUNTER — OFFICE VISIT (OUTPATIENT)
Dept: PODIATRY | Facility: CLINIC | Age: 58
End: 2023-02-01
Payer: COMMERCIAL

## 2023-02-01 VITALS — WEIGHT: 248 LBS | BODY MASS INDEX: 32.87 KG/M2 | HEIGHT: 73 IN

## 2023-02-01 DIAGNOSIS — N18.5 TYPE 2 DIABETES MELLITUS WITH STAGE 5 CHRONIC KIDNEY DISEASE: Primary | ICD-10-CM

## 2023-02-01 DIAGNOSIS — Z87.2 HEALED ULCER OF LEFT FOOT ON EXAMINATION: ICD-10-CM

## 2023-02-01 DIAGNOSIS — E11.49 TYPE II DIABETES MELLITUS WITH NEUROLOGICAL MANIFESTATIONS: ICD-10-CM

## 2023-02-01 DIAGNOSIS — E11.22 TYPE 2 DIABETES MELLITUS WITH STAGE 5 CHRONIC KIDNEY DISEASE: Primary | ICD-10-CM

## 2023-02-01 DIAGNOSIS — L97.512 FOOT ULCER, RIGHT, WITH FAT LAYER EXPOSED: ICD-10-CM

## 2023-02-01 PROCEDURE — 1160F PR REVIEW ALL MEDS BY PRESCRIBER/CLIN PHARMACIST DOCUMENTED: ICD-10-PCS | Mod: CPTII,S$GLB,, | Performed by: PODIATRIST

## 2023-02-01 PROCEDURE — 1159F MED LIST DOCD IN RCRD: CPT | Mod: CPTII,S$GLB,, | Performed by: PODIATRIST

## 2023-02-01 PROCEDURE — 99213 PR OFFICE/OUTPT VISIT, EST, LEVL III, 20-29 MIN: ICD-10-PCS | Mod: S$GLB,,, | Performed by: PODIATRIST

## 2023-02-01 PROCEDURE — 3044F HG A1C LEVEL LT 7.0%: CPT | Mod: CPTII,S$GLB,, | Performed by: PODIATRIST

## 2023-02-01 PROCEDURE — 1160F RVW MEDS BY RX/DR IN RCRD: CPT | Mod: CPTII,S$GLB,, | Performed by: PODIATRIST

## 2023-02-01 PROCEDURE — 99213 OFFICE O/P EST LOW 20 MIN: CPT | Mod: S$GLB,,, | Performed by: PODIATRIST

## 2023-02-01 PROCEDURE — 3008F BODY MASS INDEX DOCD: CPT | Mod: CPTII,S$GLB,, | Performed by: PODIATRIST

## 2023-02-01 PROCEDURE — 3008F PR BODY MASS INDEX (BMI) DOCUMENTED: ICD-10-PCS | Mod: CPTII,S$GLB,, | Performed by: PODIATRIST

## 2023-02-01 PROCEDURE — 3044F PR MOST RECENT HEMOGLOBIN A1C LEVEL <7.0%: ICD-10-PCS | Mod: CPTII,S$GLB,, | Performed by: PODIATRIST

## 2023-02-01 PROCEDURE — 99999 PR PBB SHADOW E&M-EST. PATIENT-LVL IV: CPT | Mod: PBBFAC,,, | Performed by: PODIATRIST

## 2023-02-01 PROCEDURE — 1159F PR MEDICATION LIST DOCUMENTED IN MEDICAL RECORD: ICD-10-PCS | Mod: CPTII,S$GLB,, | Performed by: PODIATRIST

## 2023-02-01 PROCEDURE — 99999 PR PBB SHADOW E&M-EST. PATIENT-LVL IV: ICD-10-PCS | Mod: PBBFAC,,, | Performed by: PODIATRIST

## 2023-02-08 ENCOUNTER — OFFICE VISIT (OUTPATIENT)
Dept: PODIATRY | Facility: CLINIC | Age: 58
End: 2023-02-08
Payer: COMMERCIAL

## 2023-02-08 VITALS — WEIGHT: 248 LBS | HEIGHT: 73 IN | BODY MASS INDEX: 32.87 KG/M2

## 2023-02-08 DIAGNOSIS — E11.621 DIABETIC ULCER OF RIGHT MIDFOOT ASSOCIATED WITH TYPE 2 DIABETES MELLITUS, WITH FAT LAYER EXPOSED: ICD-10-CM

## 2023-02-08 DIAGNOSIS — E11.22 TYPE 2 DIABETES MELLITUS WITH STAGE 5 CHRONIC KIDNEY DISEASE: Primary | ICD-10-CM

## 2023-02-08 DIAGNOSIS — L97.412 DIABETIC ULCER OF RIGHT MIDFOOT ASSOCIATED WITH TYPE 2 DIABETES MELLITUS, WITH FAT LAYER EXPOSED: ICD-10-CM

## 2023-02-08 DIAGNOSIS — N18.5 TYPE 2 DIABETES MELLITUS WITH STAGE 5 CHRONIC KIDNEY DISEASE: Primary | ICD-10-CM

## 2023-02-08 PROCEDURE — 11042 DBRDMT SUBQ TIS 1ST 20SQCM/<: CPT | Mod: S$GLB,,, | Performed by: PODIATRIST

## 2023-02-08 PROCEDURE — 3008F PR BODY MASS INDEX (BMI) DOCUMENTED: ICD-10-PCS | Mod: CPTII,S$GLB,, | Performed by: PODIATRIST

## 2023-02-08 PROCEDURE — 11042 PR DEBRIDEMENT, SKIN, SUB-Q TISSUE,=<20 SQ CM: ICD-10-PCS | Mod: S$GLB,,, | Performed by: PODIATRIST

## 2023-02-08 PROCEDURE — 99499 UNLISTED E&M SERVICE: CPT | Mod: S$GLB,,, | Performed by: PODIATRIST

## 2023-02-08 PROCEDURE — 99999 PR PBB SHADOW E&M-EST. PATIENT-LVL III: CPT | Mod: PBBFAC,,, | Performed by: PODIATRIST

## 2023-02-08 PROCEDURE — 99999 PR PBB SHADOW E&M-EST. PATIENT-LVL III: ICD-10-PCS | Mod: PBBFAC,,, | Performed by: PODIATRIST

## 2023-02-08 PROCEDURE — 1159F PR MEDICATION LIST DOCUMENTED IN MEDICAL RECORD: ICD-10-PCS | Mod: CPTII,S$GLB,, | Performed by: PODIATRIST

## 2023-02-08 PROCEDURE — 99499 NO LOS: ICD-10-PCS | Mod: S$GLB,,, | Performed by: PODIATRIST

## 2023-02-08 PROCEDURE — 1159F MED LIST DOCD IN RCRD: CPT | Mod: CPTII,S$GLB,, | Performed by: PODIATRIST

## 2023-02-08 PROCEDURE — 3008F BODY MASS INDEX DOCD: CPT | Mod: CPTII,S$GLB,, | Performed by: PODIATRIST

## 2023-02-08 NOTE — PROGRESS NOTES
Subjective:      Patient ID: Catalino Mcfadden is a 57 y.o. male.    Chief Complaint: Diabetes Mellitus (6/17/22 Dr Lombard PCP) and Wound Check    Catalino is a 57 y.o. male who presents to the clinic for evaluation and treatment of high risk feet. Catalino has a past medical history of CKD (chronic kidney disease), stage III (07/16/2020), CKD stage 3 due to type 1 diabetes mellitus, CKD stage 3 due to type 2 diabetes mellitus, Diabetes mellitus, type 2, Diabetic foot ulcer associated with diabetes mellitus due to underlying condition (07/16/2020), Diabetic foot ulcers, Edema (08/03/2020), Hyperlipidemia, Hypertension, and Obese. Reports new onset left heel blister x several days after a sock rubbed on the left heel. Seen in ED on 11/7/22.     11/16/2022 returns to clinic for follow up of left heel wound.  Seen by my colleague last week, cultures obtained (NGTD) and patient placed on PO abx.  He has kept dressing intact.     11/23/22: F/u left heel ulceration. Presents with calamine coflex left foot.     11/30/22: F/u left heel ulceration. Patient reports going to work this week.     12/14/22: F/u left heel ulceration. Presents in calamine coflex wrap.     12/21/22: F/u left heel ulceration. Presents in calamine coflex wrap.     12/27/22: F/u left heel ulceration. Presents in calamine coflex wrap. No new pedal complaints, continues to work     01/04/23: F/u left heel ulceration. Presents in calamine coflex wrap. No new pedal complaints, continues to work.  Continues to have hiccups      01/11/23: F/u left heel ulceration. Presents in calamine coflex wrap which appears to have padding which shifted medially. No new pedal complaints, continues to work.  Continues to have hiccups    01/18/23: F/u left heel ulceration. Presents in intact calamine coflex wrap. No new pedal complaints.    2/8/23: F/u right foot ulceration. Presents in calamine coflex wrap.     PCP: Azikiwe K Lombard, MD (Inactive)    Date Last Seen by PCP:   Chief  "Complaint   Patient presents with    Diabetes Mellitus     6/17/22 Dr Lombard PCP    Wound Check           Hemoglobin A1C   Date Value Ref Range Status   02/23/2022 5.0 4.0 - 5.6 % Final     Comment:     ADA Screening Guidelines:  5.7-6.4%  Consistent with prediabetes  >or=6.5%  Consistent with diabetes    High levels of fetal hemoglobin interfere with the HbA1C  assay. Heterozygous hemoglobin variants (HbS, HgC, etc)do  not significantly interfere with this assay.   However, presence of multiple variants may affect accuracy.     02/09/2022 5.0 4.0 - 5.6 % Final     Comment:     ADA Screening Guidelines:  5.7-6.4%  Consistent with prediabetes  >or=6.5%  Consistent with diabetes    High levels of fetal hemoglobin interfere with the HbA1C  assay. Heterozygous hemoglobin variants (HbS, HgC, etc)do  not significantly interfere with this assay.   However, presence of multiple variants may affect accuracy.     07/28/2021 5.7 (H) 4.0 - 5.6 % Final     Comment:     ADA Screening Guidelines:  5.7-6.4%  Consistent with prediabetes  >or=6.5%  Consistent with diabetes    High levels of fetal hemoglobin interfere with the HbA1C  assay. Heterozygous hemoglobin variants (HbS, HgC, etc)do  not significantly interfere with this assay.   However, presence of multiple variants may affect accuracy.         Review of Systems   Constitutional: Negative for chills.   Cardiovascular:  Negative for chest pain and claudication.   Respiratory:  Negative for cough.    Skin:  Positive for color change, dry skin and nail changes.   Musculoskeletal:  Positive for joint pain.   Gastrointestinal:  Negative for nausea.   Neurological:  Positive for paresthesias. Negative for numbness.   Psychiatric/Behavioral:  The patient is not nervous/anxious.          Objective:      Vitals:    02/08/23 0757   Weight: 112.5 kg (248 lb 0.3 oz)   Height: 6' 1" (1.854 m)       Physical Exam  Vitals and nursing note reviewed.   Constitutional:       General: He is " not in acute distress.     Appearance: He is not toxic-appearing or diaphoretic.      Comments: Pt. is well-developed, well-nourished, appears stated age, in no acute distress, alert and oriented x 3. No evidence of depression, anxiety, or agitation. Calm, cooperative, and communicative. Appropriate interactions and affect.   Cardiovascular:      Pulses:           Dorsalis pedis pulses are 2+ on the right side and 2+ on the left side.        Posterior tibial pulses are 1+ on the right side and 1+ on the left side.      Comments: There is decreased digital hair. Skin is atrophic, slightly hyperpigmented  Pulmonary:      Effort: No respiratory distress.   Musculoskeletal:      Right lower leg: Edema present.      Left lower leg: Edema present.      Right ankle: Swelling present. No tenderness. No lateral malleolus, medial malleolus, AITF ligament, CF ligament or posterior TF ligament tenderness.      Right Achilles Tendon: No defects. Hilliard's test negative.      Left ankle: Swelling present. No tenderness. No lateral malleolus, medial malleolus, AITF ligament, CF ligament or posterior TF ligament tenderness.      Left Achilles Tendon: No defects. Hilliard's test negative.      Right foot: No tenderness or bony tenderness.      Left foot: No tenderness or bony tenderness.      Comments: Decreased stride, station of gait.  apropulsive toe off.  Increased angle and base of gait.    There is equinus deformity bilateral with decreased dorsiflexion at the ankle joint bilateral. No tenderness with compression of heel. Negative tinels sign. Gait analysis reveals excessive pronation through midstance and propulsion with early heel off.     Patient has hammertoes of digits 2-5 bilateral partially reducible     Decreased first MPJ range of motion both weightbearing and nonweightbearing, no crepitus observed the first MP joint, + dorsal flag sign.     Visible and palpable bunion without pain at dorsomedial 1st metatarsal head  right and left.  Hallux abducted right and left partially reducible, tracks laterally without being track bound.  No ecchymosis, erythema, edema, or cardinal signs infection or signs of trauma same foot.    Fat pad atrophy to heels and met heads bilateral    Plantarflexed 1st ray RLE   Lymphadenopathy:      Comments: No lymphatic streaking    Negative lymphadenopathy bilateral popliteal fossa and tarsal tunnel.     Skin:     General: Skin is warm and dry.      Coloration: Skin is not pale.      Findings: Abrasion (left lateral midfoot) and lesion (see wound descriptions below) present. No ecchymosis, laceration or rash.      Nails: There is no clubbing.      Comments: Toenails 1-5 bilaterally discolored/yellowed, dystrophic, brittle with subungual debris.   Superficial abrasion left dorsal lateral foot.    Focal hyperkeratotic lesion consisting entirely of hyperkeratotic tissue without open skin, drainage, pus, fluctuance, malodor, or signs of infection:  left plantar foot.       Neurological:      Sensory: Sensory deficit present.      Comments:  Schneider-Dayton 5.07 monofilamant testing is diminished Kp feet. Decreased/absent vibratory sensation bilateral feet to 128Hz tuning fork.     Paresthesias, and hyperesthesia bilateral feet with no clearly identified trigger or source.           Psychiatric:         Attention and Perception: He is attentive.         Mood and Affect: Mood is not anxious. Affect is not inappropriate.         Speech: He is communicative. Speech is not slurred.         Behavior: Behavior is not combative.       2/8/23:  Post hudson: 0.5ccmx0.5cmx0.1cm red granular base right medial foot ulceration.             01/18/23:               01/11/23:        01/04/23:        12/27/22:         12/21/22:          11/30/22 11/16/2022    Some areas of deeper pressure noted but otherwise stable            11/9/22:  S/p drainage left heel bullae formation serous drainage noted.             Assessment:        Encounter Diagnoses   Name Primary?    Type 2 diabetes mellitus with stage 5 chronic kidney disease Yes    Diabetic ulcer of right midfoot associated with type 2 diabetes mellitus, with fat layer exposed                      Plan:       Catalino was seen today for diabetes mellitus and wound check.    Diagnoses and all orders for this visit:    Type 2 diabetes mellitus with stage 5 chronic kidney disease    Diabetic ulcer of right midfoot associated with type 2 diabetes mellitus, with fat layer exposed              I counseled the patient on his conditions, their implications and medical management.    Education about the prevention of limb loss.    Discussed wound healing cycle, skin integrity, ways to care for skin.Counseled patient on the effects of biomechanical pressure on healing. He verbalizes understanding that it can increase the chances of delayed healing and this prolonged exposure leads to infection or progression of infection which subsequently can result in loss of limb.    Adequate vitamin supplementation, protein intake, and hydration - discussed with patient    The wound is cleansed of foreign material as much as possible and the base inspected for bone or abscess.      Wound has virtually healed but skin is thin in area of pressure - left     Debridement: With verbal consent, nonviable tissues on the right foot were debrided to subq utilizing a  sterile No. 3 scalpel and forceps. Minimal bleeding controlled with direct pressure  The patient tolerated this well.     Dressings: collagen, calamine coflex compression wrap applied to RLE   Well tolerated  Offloading: football and darco shoe    With the patient's verbal consent a sterile #15 scalpel was used to trim the hyperkeratotic lesion described above. Left foot.       Follow-up: 1 week but should call [facility] immediately if any signs of infection, such as fever, chills, sweats, increased redness or pain.    Short-term goals include  maintaining good offloading and minimizing bioburden, promoting granulation and epithelialization to healing.  Long-term goals include keeping the wound healed by good offloading and medical management under the direction of internist.    Shoe inspection. Diabetic Foot Education. Patient reminded of the importance of good nutrition and blood sugar control to help prevent podiatric complications of diabetes. Patient instructed on proper foot hygeine. We discussed wearing proper shoe gear, daily foot inspections, never walking without protective shoe gear, never putting sharp instruments to feet.

## 2023-02-08 NOTE — PROGRESS NOTES
Requested updates within Care Everywhere.  Patient's chart was reviewed for overdue VIRGILIO topics.  Immunizations reconciled.      fitkit orders placed.   Speaking Coherently

## 2023-02-13 NOTE — PROGRESS NOTES
Subjective:      Patient ID: Catalino Mcfadden is a 57 y.o. male.    Chief Complaint: Diabetes Mellitus, Wound Check, Follow-up, and Foot Problem    Catalino is a 57 y.o. male who presents to the clinic for evaluation and treatment of high risk feet. Catalino has a past medical history of CKD (chronic kidney disease), stage III (07/16/2020), CKD stage 3 due to type 1 diabetes mellitus, CKD stage 3 due to type 2 diabetes mellitus, Diabetes mellitus, type 2, Diabetic foot ulcer associated with diabetes mellitus due to underlying condition (07/16/2020), Diabetic foot ulcers, Edema (08/03/2020), Hyperlipidemia, Hypertension, and Obese. Reports new onset left heel blister x several days after a sock rubbed on the left heel. Seen in ED on 11/7/22.     11/16/2022 returns to clinic for follow up of left heel wound.  Seen by my colleague last week, cultures obtained (NGTD) and patient placed on PO abx.  He has kept dressing intact.     11/23/22: F/u left heel ulceration. Presents with calamine coflex left foot.     11/30/22: F/u left heel ulceration. Patient reports going to work this week.     12/14/22: F/u left heel ulceration. Presents in calamine coflex wrap.     12/21/22: F/u left heel ulceration. Presents in calamine coflex wrap.     12/27/22: F/u left heel ulceration. Presents in calamine coflex wrap. No new pedal complaints, continues to work     01/04/23: F/u left heel ulceration. Presents in calamine coflex wrap. No new pedal complaints, continues to work.  Continues to have hiccups      01/11/23: F/u left heel ulceration. Presents in calamine coflex wrap which appears to have padding which shifted medially. No new pedal complaints, continues to work.  Continues to have hiccups    01/18/23: F/u left heel ulceration. Presents in intact calamine coflex wrap. No new pedal complaints.                                                                                                                                                                                                                                                                                      01/25/23 F/u left heel ulceration. Presents in intact calamine coflex wrap to the LLE. Patients relates that on Monday he removed bandage to the right foot and the skin peeled off.    PCP: Azikiwe K Lombard, MD (Inactive)    Date Last Seen by PCP:   Chief Complaint   Patient presents with    Diabetes Mellitus    Wound Check    Follow-up    Foot Problem           Hemoglobin A1C   Date Value Ref Range Status   02/23/2022 5.0 4.0 - 5.6 % Final     Comment:     ADA Screening Guidelines:  5.7-6.4%  Consistent with prediabetes  >or=6.5%  Consistent with diabetes    High levels of fetal hemoglobin interfere with the HbA1C  assay. Heterozygous hemoglobin variants (HbS, HgC, etc)do  not significantly interfere with this assay.   However, presence of multiple variants may affect accuracy.     02/09/2022 5.0 4.0 - 5.6 % Final     Comment:     ADA Screening Guidelines:  5.7-6.4%  Consistent with prediabetes  >or=6.5%  Consistent with diabetes    High levels of fetal hemoglobin interfere with the HbA1C  assay. Heterozygous hemoglobin variants (HbS, HgC, etc)do  not significantly interfere with this assay.   However, presence of multiple variants may affect accuracy.     07/28/2021 5.7 (H) 4.0 - 5.6 % Final     Comment:     ADA Screening Guidelines:  5.7-6.4%  Consistent with prediabetes  >or=6.5%  Consistent with diabetes    High levels of fetal hemoglobin interfere with the HbA1C  assay. Heterozygous hemoglobin variants (HbS, HgC, etc)do  not significantly interfere with this assay.   However, presence of multiple variants may affect accuracy.         Review of Systems   Constitutional: Negative for chills.   Cardiovascular:  Negative for chest pain and claudication.   Respiratory:  Negative for cough.    Skin:  Positive for color change, dry skin and nail changes.   Musculoskeletal:  Positive for joint  "pain.   Gastrointestinal:  Negative for nausea.   Neurological:  Positive for paresthesias. Negative for numbness.   Psychiatric/Behavioral:  The patient is not nervous/anxious.          Objective:      Vitals:    01/25/23 0724   Weight: 112.5 kg (248 lb)   Height: 6' 1" (1.854 m)   PainSc: 0-No pain       Physical Exam  Vitals and nursing note reviewed.   Constitutional:       General: He is not in acute distress.     Appearance: He is not toxic-appearing or diaphoretic.      Comments: Pt. is well-developed, well-nourished, appears stated age, in no acute distress, alert and oriented x 3. No evidence of depression, anxiety, or agitation. Calm, cooperative, and communicative. Appropriate interactions and affect.   Cardiovascular:      Pulses:           Dorsalis pedis pulses are 2+ on the right side and 2+ on the left side.        Posterior tibial pulses are 1+ on the right side and 1+ on the left side.      Comments: There is decreased digital hair. Skin is atrophic, slightly hyperpigmented  Pulmonary:      Effort: No respiratory distress.   Musculoskeletal:      Right lower leg: Edema present.      Left lower leg: Edema present.      Right ankle: Swelling present. No tenderness. No lateral malleolus, medial malleolus, AITF ligament, CF ligament or posterior TF ligament tenderness.      Right Achilles Tendon: No defects. Hilliard's test negative.      Left ankle: Swelling present. No tenderness. No lateral malleolus, medial malleolus, AITF ligament, CF ligament or posterior TF ligament tenderness.      Left Achilles Tendon: No defects. Hilliard's test negative.      Right foot: No tenderness or bony tenderness.      Left foot: No tenderness or bony tenderness.      Comments: Decreased stride, station of gait.  apropulsive toe off.  Increased angle and base of gait.    There is equinus deformity bilateral with decreased dorsiflexion at the ankle joint bilateral. No tenderness with compression of heel. Negative tinels " sign. Gait analysis reveals excessive pronation through midstance and propulsion with early heel off.     Patient has hammertoes of digits 2-5 bilateral partially reducible     Decreased first MPJ range of motion both weightbearing and nonweightbearing, no crepitus observed the first MP joint, + dorsal flag sign.     Visible and palpable bunion without pain at dorsomedial 1st metatarsal head right and left.  Hallux abducted right and left partially reducible, tracks laterally without being track bound.  No ecchymosis, erythema, edema, or cardinal signs infection or signs of trauma same foot.    Fat pad atrophy to heels and met heads bilateral    Plantarflexed 1st ray RLE   Lymphadenopathy:      Comments: No lymphatic streaking    Negative lymphadenopathy bilateral popliteal fossa and tarsal tunnel.     Skin:     General: Skin is warm and dry.      Coloration: Skin is not pale.      Findings: Abrasion (left lateral midfoot) and lesion (see wound descriptions below) present. No ecchymosis, laceration or rash.      Nails: There is no clubbing.      Comments: Toenails 1-5 bilaterally discolored/yellowed, dystrophic, brittle with subungual debris.   Superficial abrasion left dorsal lateral foot.    Neurological:      Sensory: Sensory deficit present.      Comments:  [place] 5.07 monofilamant testing is diminished Kp feet. Decreased/absent vibratory sensation bilateral feet to 128Hz tuning fork.     Paresthesias, and hyperesthesia bilateral feet with no clearly identified trigger or source.           Psychiatric:         Attention and Perception: He is attentive.         Mood and Affect: Mood is not anxious. Affect is not inappropriate.         Speech: He is communicative. Speech is not slurred.         Behavior: Behavior is not combative.      01/25/23          Right medial 1st MTPJ            01/18/23:               01/11/23:        01/04/23:        12/27/22:          12/21/22:          11/30/22 11/16/2022    Some areas of deeper pressure noted but otherwise stable            11/9/22:  S/p drainage left heel bullae formation serous drainage noted.             Assessment:       Encounter Diagnoses   Name Primary?    Type 2 diabetes mellitus with stage 5 chronic kidney disease Yes    Type II diabetes mellitus with neurological manifestations     Foot ulcer, right, with fat layer exposed     Healed ulcer of left foot on examination                      Plan:       Catalino was seen today for diabetes mellitus, wound check, follow-up and foot problem.    Diagnoses and all orders for this visit:    Type 2 diabetes mellitus with stage 5 chronic kidney disease    Type II diabetes mellitus with neurological manifestations    Foot ulcer, right, with fat layer exposed    Healed ulcer of left foot on examination              I counseled the patient on his conditions, their implications and medical management.    Education about the prevention of limb loss.    Discussed wound healing cycle, skin integrity, ways to care for skin.Counseled patient on the effects of biomechanical pressure on healing. He verbalizes understanding that it can increase the chances of delayed healing and this prolonged exposure leads to infection or progression of infection which subsequently can result in loss of limb.    Adequate vitamin supplementation, protein intake, and hydration - discussed with patient    The wound is cleansed of foreign material as much as possible and the base inspected for bone or abscess.      While left foot wound has healed but right foot wound has recurred.     Dressings: collagen, calamine coflex compression wrap applied to RLE.  Well tolerated  Offloading: football and darco shoe    Follow-up: 1 week but should call [facility] immediately if any signs of infection, such as fever, chills, sweats, increased redness or pain.    Short-term goals include maintaining good  offloading and minimizing bioburden, promoting granulation and epithelialization to healing.  Long-term goals include keeping the wound healed by good offloading and medical management under the direction of internist.    Shoe inspection. Diabetic Foot Education. Patient reminded of the importance of good nutrition and blood sugar control to help prevent podiatric complications of diabetes. Patient instructed on proper foot hygeine. We discussed wearing proper shoe gear, daily foot inspections, never walking without protective shoe gear, never putting sharp instruments to feet.

## 2023-02-15 ENCOUNTER — OFFICE VISIT (OUTPATIENT)
Dept: CARDIOLOGY | Facility: CLINIC | Age: 58
End: 2023-02-15
Payer: COMMERCIAL

## 2023-02-15 ENCOUNTER — OFFICE VISIT (OUTPATIENT)
Dept: PODIATRY | Facility: CLINIC | Age: 58
End: 2023-02-15
Payer: COMMERCIAL

## 2023-02-15 VITALS
BODY MASS INDEX: 31.38 KG/M2 | SYSTOLIC BLOOD PRESSURE: 145 MMHG | WEIGHT: 237.88 LBS | RESPIRATION RATE: 16 BRPM | DIASTOLIC BLOOD PRESSURE: 82 MMHG | HEART RATE: 99 BPM | OXYGEN SATURATION: 99 %

## 2023-02-15 VITALS — WEIGHT: 248 LBS | BODY MASS INDEX: 32.87 KG/M2 | HEIGHT: 73 IN

## 2023-02-15 DIAGNOSIS — E11.621 DIABETIC ULCER OF LEFT HEEL ASSOCIATED WITH TYPE 2 DIABETES MELLITUS, WITH BONE INVOLVEMENT WITHOUT EVIDENCE OF NECROSIS: ICD-10-CM

## 2023-02-15 DIAGNOSIS — Z99.2 DIALYSIS PATIENT: ICD-10-CM

## 2023-02-15 DIAGNOSIS — N52.9 ERECTILE DYSFUNCTION, UNSPECIFIED ERECTILE DYSFUNCTION TYPE: ICD-10-CM

## 2023-02-15 DIAGNOSIS — L97.426 DIABETIC ULCER OF LEFT HEEL ASSOCIATED WITH TYPE 2 DIABETES MELLITUS, WITH BONE INVOLVEMENT WITHOUT EVIDENCE OF NECROSIS: ICD-10-CM

## 2023-02-15 DIAGNOSIS — E11.621 DIABETIC ULCER OF RIGHT MIDFOOT ASSOCIATED WITH TYPE 2 DIABETES MELLITUS, WITH FAT LAYER EXPOSED: Primary | ICD-10-CM

## 2023-02-15 DIAGNOSIS — M86.9 OSTEOMYELITIS OF LEFT FOOT, UNSPECIFIED TYPE: ICD-10-CM

## 2023-02-15 DIAGNOSIS — L97.512 FOOT ULCER, RIGHT, WITH FAT LAYER EXPOSED: ICD-10-CM

## 2023-02-15 DIAGNOSIS — E11.49 TYPE II DIABETES MELLITUS WITH NEUROLOGICAL MANIFESTATIONS: ICD-10-CM

## 2023-02-15 DIAGNOSIS — L97.422 DIABETIC ULCER OF LEFT MIDFOOT ASSOCIATED WITH TYPE 2 DIABETES MELLITUS, WITH FAT LAYER EXPOSED: ICD-10-CM

## 2023-02-15 DIAGNOSIS — E11.22 TYPE 2 DIABETES MELLITUS WITH STAGE 5 CHRONIC KIDNEY DISEASE: ICD-10-CM

## 2023-02-15 DIAGNOSIS — L97.412 DIABETIC ULCER OF RIGHT MIDFOOT ASSOCIATED WITH TYPE 2 DIABETES MELLITUS, WITH FAT LAYER EXPOSED: Primary | ICD-10-CM

## 2023-02-15 DIAGNOSIS — Z87.2 HEALED ULCER OF LEFT FOOT ON EXAMINATION: ICD-10-CM

## 2023-02-15 DIAGNOSIS — E78.5 HYPERLIPIDEMIA, ACQUIRED: Chronic | ICD-10-CM

## 2023-02-15 DIAGNOSIS — Z86.31 HISTORY OF DIABETIC ULCER OF FOOT: ICD-10-CM

## 2023-02-15 DIAGNOSIS — N18.6 ESRD (END STAGE RENAL DISEASE): ICD-10-CM

## 2023-02-15 DIAGNOSIS — I10 ESSENTIAL HYPERTENSION: Primary | Chronic | ICD-10-CM

## 2023-02-15 DIAGNOSIS — E11.621 DIABETIC ULCER OF LEFT MIDFOOT ASSOCIATED WITH TYPE 2 DIABETES MELLITUS, WITH FAT LAYER EXPOSED: ICD-10-CM

## 2023-02-15 DIAGNOSIS — L97.522 FOOT ULCERATION, LEFT, WITH FAT LAYER EXPOSED: ICD-10-CM

## 2023-02-15 DIAGNOSIS — M86.9 OSTEOMYELITIS OF SECOND TOE OF LEFT FOOT: ICD-10-CM

## 2023-02-15 DIAGNOSIS — N18.5 TYPE 2 DIABETES MELLITUS WITH STAGE 5 CHRONIC KIDNEY DISEASE: ICD-10-CM

## 2023-02-15 PROCEDURE — 1159F PR MEDICATION LIST DOCUMENTED IN MEDICAL RECORD: ICD-10-PCS | Mod: CPTII,S$GLB,, | Performed by: PODIATRIST

## 2023-02-15 PROCEDURE — 3008F BODY MASS INDEX DOCD: CPT | Mod: CPTII,S$GLB,, | Performed by: PODIATRIST

## 2023-02-15 PROCEDURE — 3008F PR BODY MASS INDEX (BMI) DOCUMENTED: ICD-10-PCS | Mod: CPTII,S$GLB,, | Performed by: PODIATRIST

## 2023-02-15 PROCEDURE — 3044F PR MOST RECENT HEMOGLOBIN A1C LEVEL <7.0%: ICD-10-PCS | Mod: CPTII,S$GLB,, | Performed by: PODIATRIST

## 2023-02-15 PROCEDURE — 3077F PR MOST RECENT SYSTOLIC BLOOD PRESSURE >= 140 MM HG: ICD-10-PCS | Mod: CPTII,S$GLB,, | Performed by: INTERNAL MEDICINE

## 2023-02-15 PROCEDURE — 93000 ELECTROCARDIOGRAM COMPLETE: CPT | Mod: S$GLB,,, | Performed by: INTERNAL MEDICINE

## 2023-02-15 PROCEDURE — 99999 PR PBB SHADOW E&M-EST. PATIENT-LVL IV: ICD-10-PCS | Mod: PBBFAC,,, | Performed by: PODIATRIST

## 2023-02-15 PROCEDURE — 1159F MED LIST DOCD IN RCRD: CPT | Mod: CPTII,S$GLB,, | Performed by: INTERNAL MEDICINE

## 2023-02-15 PROCEDURE — 1160F PR REVIEW ALL MEDS BY PRESCRIBER/CLIN PHARMACIST DOCUMENTED: ICD-10-PCS | Mod: CPTII,S$GLB,, | Performed by: INTERNAL MEDICINE

## 2023-02-15 PROCEDURE — 99213 OFFICE O/P EST LOW 20 MIN: CPT | Mod: S$GLB,,, | Performed by: PODIATRIST

## 2023-02-15 PROCEDURE — 1160F PR REVIEW ALL MEDS BY PRESCRIBER/CLIN PHARMACIST DOCUMENTED: ICD-10-PCS | Mod: CPTII,S$GLB,, | Performed by: PODIATRIST

## 2023-02-15 PROCEDURE — 3008F BODY MASS INDEX DOCD: CPT | Mod: CPTII,S$GLB,, | Performed by: INTERNAL MEDICINE

## 2023-02-15 PROCEDURE — 1160F RVW MEDS BY RX/DR IN RCRD: CPT | Mod: CPTII,S$GLB,, | Performed by: INTERNAL MEDICINE

## 2023-02-15 PROCEDURE — 99999 PR PBB SHADOW E&M-EST. PATIENT-LVL IV: CPT | Mod: PBBFAC,,, | Performed by: PODIATRIST

## 2023-02-15 PROCEDURE — 3044F HG A1C LEVEL LT 7.0%: CPT | Mod: CPTII,S$GLB,, | Performed by: PODIATRIST

## 2023-02-15 PROCEDURE — 99999 PR PBB SHADOW E&M-EST. PATIENT-LVL IV: ICD-10-PCS | Mod: PBBFAC,,, | Performed by: INTERNAL MEDICINE

## 2023-02-15 PROCEDURE — 3077F SYST BP >= 140 MM HG: CPT | Mod: CPTII,S$GLB,, | Performed by: INTERNAL MEDICINE

## 2023-02-15 PROCEDURE — 1159F PR MEDICATION LIST DOCUMENTED IN MEDICAL RECORD: ICD-10-PCS | Mod: CPTII,S$GLB,, | Performed by: INTERNAL MEDICINE

## 2023-02-15 PROCEDURE — 1160F RVW MEDS BY RX/DR IN RCRD: CPT | Mod: CPTII,S$GLB,, | Performed by: PODIATRIST

## 2023-02-15 PROCEDURE — 99999 PR PBB SHADOW E&M-EST. PATIENT-LVL IV: CPT | Mod: PBBFAC,,, | Performed by: INTERNAL MEDICINE

## 2023-02-15 PROCEDURE — 99214 PR OFFICE/OUTPT VISIT, EST, LEVL IV, 30-39 MIN: ICD-10-PCS | Mod: S$GLB,,, | Performed by: INTERNAL MEDICINE

## 2023-02-15 PROCEDURE — 99214 OFFICE O/P EST MOD 30 MIN: CPT | Mod: S$GLB,,, | Performed by: INTERNAL MEDICINE

## 2023-02-15 PROCEDURE — 1159F MED LIST DOCD IN RCRD: CPT | Mod: CPTII,S$GLB,, | Performed by: PODIATRIST

## 2023-02-15 PROCEDURE — 99213 PR OFFICE/OUTPT VISIT, EST, LEVL III, 20-29 MIN: ICD-10-PCS | Mod: S$GLB,,, | Performed by: PODIATRIST

## 2023-02-15 PROCEDURE — 3079F DIAST BP 80-89 MM HG: CPT | Mod: CPTII,S$GLB,, | Performed by: INTERNAL MEDICINE

## 2023-02-15 PROCEDURE — 93000 EKG 12-LEAD: ICD-10-PCS | Mod: S$GLB,,, | Performed by: INTERNAL MEDICINE

## 2023-02-15 PROCEDURE — 3079F PR MOST RECENT DIASTOLIC BLOOD PRESSURE 80-89 MM HG: ICD-10-PCS | Mod: CPTII,S$GLB,, | Performed by: INTERNAL MEDICINE

## 2023-02-15 PROCEDURE — 3008F PR BODY MASS INDEX (BMI) DOCUMENTED: ICD-10-PCS | Mod: CPTII,S$GLB,, | Performed by: INTERNAL MEDICINE

## 2023-02-15 NOTE — ED PROVIDER NOTES
It was a pleasure to see Lisy Medina in Pediatric Gastroenterology, Hepatology, and Nutrition Clinic at [facility].  I hope that this consultation meets her needs and your expectations.  Should you have further questions or concerns, please contact my team.    Lisy Medina is a 6 m.o. female seen in clinic today for Failure To Thrive (Since birth and is on 2nd formula, which is similac total comfort. Mom did nurse 3 weeks and was put on neocate and started similac total comfort on 1.10.2023.) and Gas (Since birth.)  .  she is accompanied by her father who is an able historian.    ASSESSMENT/PLAN:  1. Mild malnutrition  Overview:  Given her premature and comorbidities, Lisy is doing well with her growth and development.      Assessment & Plan:  When corrected for her GA, she is actually looking good on the growth chart.  Certainly we want to maintain this trajectory.      22kcal Nutramigen or PurAmino as tolerated.  Thicken with Gel Mix  Nutrition referral    Orders:  -     Ambulatory referral/consult to Nutrition Services; Future; Expected date: 2023  -     inf form,sp.met,lac fr,iron-GG (NUTRAMIGEN WITH ENFLORA LGG) 2.8-5.3-10.3 gram/100 kcal Powd; Take 3 oz by mouth every 3 (three) hours.  -     maltodextrin-carob (GELMIX) Powd; Take 1 packet by mouth 6 (six) times daily. Nectar thick  Dispense: 125 g; Refill: 3    2. Premature infant of 26 weeks gestation  Overview:  This chronic medical condition played a role in my medical decision making.        Assessment & Plan:  Continue to monitor growth and development.    Orders:  -     Ambulatory referral/consult to Nutrition Services; Future; Expected date: 2023    3. Stage 2 necrotizing enterocolitis in   Overview:  Parents report medical NEC in the NICU at [facility].  NPO and IV antibiotics, but no surgery.  Need records.    Assessment & Plan:  No current concerns related to medical NEC but we always  Encounter Date: 11/7/2022       History     Chief Complaint   Patient presents with    Wound Check     Recently stopped wearing unna boots and now has blister to left heel & podiatrist unavailable today     57-year-old male with a history of CKD, diabetes, hypertension, nonhealing foot wounds, and others presenting for evaluation of a blister to the left heel.  Reports that he tried to see his podiatrist but that she is out of town.  Reports that he also could not get in to see wound care.  He came to the ED because he is concerned due to his history of nonhealing foot wounds in the past.  States that he can not feel his feet chronically and therefore is not certain what caused the blister to appear.  Denies drainage, redness, or any other symptoms.    Review of patient's allergies indicates:  No Known Allergies  Past Medical History:   Diagnosis Date    CKD (chronic kidney disease), stage III 07/16/2020    CKD stage 3 due to type 1 diabetes mellitus     CKD stage 3 due to type 2 diabetes mellitus     Diabetes mellitus, type 2     Diabetic foot ulcer associated with diabetes mellitus due to underlying condition 07/16/2020    Diabetic foot ulcers     Edema 08/03/2020    generalized, including genital area    Hyperlipidemia     Hypertension     Obese      Past Surgical History:   Procedure Laterality Date    BONE BIOPSY Left 7/17/2020    Procedure: BIOPSY, BONE;  Surgeon: Verona Whitmore DPM;  Location: [facility] OR;  Service: Podiatry;  Laterality: Left;    CERVICAL DISC SURGERY  07/11/2016    DEBRIDEMENT Left 7/17/2020    Procedure: DEBRIDEMENT;  Surgeon: Verona Whitmore DPM;  Location: [facility] OR;  Service: Podiatry;  Laterality: Left;    DEBRIDEMENT OF FOOT Right     toes & plantar surface    FRACTURE SURGERY Right     medial ankle, metal plate present    INSERTION OF TUNNELED CENTRAL VENOUS CATHETER (CVC) WITH SUBCUTANEOUS PORT N/A 6/11/2021    Procedure: TUNNEL CATH INSERTION WITHOUT PORT;  Surgeon: Jose Manuel Diagnostic  Provider;  Location: [facility] OR;  Service: Radiology;  Laterality: N/A;  11AM START---PHONE PREOP 6/10/21---COVID POSTIVE ON 7/2020---NO S/S    left leg orthopedic surgery Left      Family History   Problem Relation Age of Onset    Heart disease Father     Colon cancer Neg Hx     Esophageal cancer Neg Hx      Social History     Tobacco Use    Smoking status: Never    Smokeless tobacco: Never   Substance Use Topics    Alcohol use: Yes     Comment: social    Drug use: No     Review of Systems   Constitutional:  Negative for chills and fever.   HENT:  Negative for congestion, postnasal drip, rhinorrhea, sinus pressure and sore throat.    Eyes:  Negative for pain and redness.   Respiratory:  Negative for apnea, cough, choking, chest tightness, shortness of breath, wheezing and stridor.    Cardiovascular:  Negative for chest pain, palpitations and leg swelling.   Gastrointestinal:  Negative for abdominal pain, diarrhea, nausea and vomiting.   Genitourinary:  Negative for dysuria, flank pain, penile pain and urgency.   Musculoskeletal:  Negative for arthralgias, back pain, gait problem, joint swelling, myalgias, neck pain and neck stiffness.   Skin:  Positive for wound. Negative for color change, pallor and rash.   Neurological:  Negative for dizziness, tremors, seizures, syncope and light-headedness.   Psychiatric/Behavioral:  Negative for confusion. The patient is not nervous/anxious.      Physical Exam     Initial Vitals [11/07/22 0816]   BP Pulse Resp Temp SpO2   (!) 209/108 104 18 98.2 °F (36.8 °C) 99 %      MAP       --         Physical Exam    Nursing note and vitals reviewed.  Constitutional: He appears well-developed and well-nourished. He is not diaphoretic. No distress.   HENT:   Head: Normocephalic and atraumatic.   Right Ear: External ear normal.   Left Ear: External ear normal.   Nose: Nose normal.   Eyes: EOM are normal. Right eye exhibits no discharge. Left eye exhibits no discharge.   Neck: Neck supple. No  are concerned for stricturing due to intramural inflammation of the bowel.        4. Sickle cell trait  Overview:  Chronic.      Assessment & Plan:  Stable.        5. Retinopathy of prematurity, unspecified laterality  Overview:  26 weeker.      Assessment & Plan:  Follow-up with Ophthomology as scheduled.      6. Extreme immaturity  Overview:  gestational age 26 completed weeks    Assessment & Plan:  This chronic medical condition played a role in my medical decision making.              7. Disturbance of cerebral status of   Overview:  This chronic medical condition played a role in my medical decision making.        Assessment & Plan:  Continue to monitor growth and development.      8. Anemia of prematurity    9. Poor weight gain in infant  -     Ambulatory referral/consult to Pediatric Gastroenterology    10. Slow transit constipation  Overview:  We discussed at length the pathophysiology of how dyschezia leads to withholding which leads to rectal hyposensitivity and increased rectal compliance which then leads to overflow incontinence.  In light of minimal improvements with previous efforts, I have opted for a modified cleanout and a more  maintenance routine which entails a daily stimulant laxative to serve as a proxy for rectal stimulation which withholders ignore.  By doing this, I hope to have to have the patient have a daily to every other day bowel movement and disassociate pain with defecation.  I also discussed the 3 rules by which I need the family to abide in order to help them and I would have them maintain the POOP Journal to keep track of the nature and frequency of the stools.  Once again, consistent efforts are degroot.   At the next visit, we will assess the rectal stool burden and/or motility at the next visit.    Considerations:  Chronic functional constipation with fecal retention and overflow incontinence  Redundant colon  Dyssynergia  Slow transit constipation  High processed  carbohydrate, low fiber diet  Dehydration  IBS-C  Inconsistencies with plan  Dysmotility      Assessment & Plan:  I think previous efforts to thicken formula have resulted in constipation.  KUB revealed constipated.  She has a large ball of stool in her rectum for which I would have you do a cleanout:  Goal of milk shake to soft serve stools daily.     Infant Home Cleanout- 2-3 days  Day One  Milk of Magnesia 400mg/5mL  2mL three times a day for 2-3 days  Glycerin suppository sliver nightly   Senna 0.5mL nightly  Day Two  Milk of Magnesia 400mg/5mL  2mL three times a day for 2-3 days  Glycerin suppository sliver nightly   Senna 0.5mL nightly    Maintenance- D A I L Y  plan to keep stools soft and moving  Milk of Magnesia 1-2.5mL 1-2 times a day  +/- Senna 0.5 to 1mL nightly    G O A L:  One milk shake to soft serve stool daily to every other day.    Most if not all of these will be over the counter as they are not covered by insurance.  You will have to buy them yourself.  A prescription has been sent in, but the pharmacist will likely not have a prescription ready for pick-up.  They should be able to assist you in finding them on the shelves.      Orders:  -     X-Ray Abdomen AP 1 View; Future; Expected date: 02/15/2023  -     magnesium hydroxide 400 mg/5 ml (MILK OF MAGNESIA) 400 mg/5 mL Susp; Take 2.5 mLs (200 mg total) by mouth 2 (two) times a day.  Dispense: 150 mL; Refill: 0  -     glycerin pediatric suppository; Place 0.5 suppositories rectally as needed for Constipation.  Dispense: 12 suppository; Refill: 0    11. Pediatric feeding disorder, chronic  Overview:  Despite her significant prematurity, she is doing fairly well.    Assessment & Plan:  Continue therapy efforts  Close monitoring of growth and development.    Orders:  -     inf form,sp.met,lac fr,iron-GG (NUTRAMIGEN WITH ENFLORA LGG) 2.8-5.3-10.3 gram/100 kcal Powd; Take 3 oz by mouth every 3 (three) hours.  -     maltodextrin-carob (GELMIX) Powd;  tracheal deviation present.   Normal range of motion.  Cardiovascular:  Normal rate.           Pulmonary/Chest: No stridor. No respiratory distress.   Abdominal: Abdomen is soft. He exhibits no distension. There is no abdominal tenderness.   Musculoskeletal:         General: No tenderness. Normal range of motion.      Cervical back: Normal range of motion and neck supple.     Neurological: He is alert and oriented to person, place, and time. He has normal strength. No cranial nerve deficit.   Skin: Skin is warm and dry.   Superficial blistering to the posterior left foot.  Blister appears to be filled with clear fluid.  No associated erythema, warmth, induration, or any other signs of infection.   Psychiatric: He has a normal mood and affect. His behavior is normal. Judgment and thought content normal.       ED Course   Procedures  Labs Reviewed - No data to display       Imaging Results    None          Medications   LIDOcaine HCL 10 mg/ml (1%) injection 10 mL (10 mLs Infiltration Given by Other 11/7/22 0923)     Medical Decision Making:   History:   Old Medical Records: I decided to obtain old medical records.  ED Management:  Blister to posterior left foot as above.  Likely friction blistered given location.  Skin is intact.  No evidence of wound infection or other complication.  Given history of diabetic foot wounds I will leave the skin intact at this time.  Padded bandage applied to the area.  Advised patient to ensure that his shoes fit well.  Advised him to follow-up with his podiatrist and or wound care as planned.  ED return precautions given.  He expressed understanding.                        Clinical Impression:   Final diagnoses:  [Z51.89] Visit for wound check  [R88.343E] Blister of left foot, initial encounter (Primary)      ED Disposition Condition    Discharge Stable          ED Prescriptions    None       Follow-up Information       Follow up With Specialties Details Why Contact Info    Aviva MARROQUIN  [facility] Podiatry, Wound Care Schedule an appointment as soon as possible for a visit in 2 days For further evaluation [address] [facility] [id number] [phone]      Verona Whitmore DPM Podiatry, Wound Care Schedule an appointment as soon as possible for a visit in 2 days For further evaluation [address] [facility] [id number] [phone]      [facility] Emergency Medicine Go to  If symptoms worsen, As needed [address] [place], [place] [address] [id number] [phone]             Sukhi Bennett, SWATHI  11/12/22 6463     Take 1 packet by mouth 6 (six) times daily. 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 Moralez of Pediatric Cardiology.    Assessment & Plan:  Chronic issue.  Doing well overall.        13. Atrial septal defect  Overview:  Chronic issue.  Stable.    Assessment & Plan:  Followed by Dr. Moralez.      Other orders  -     simethicone (MYLICON) 40 mg/0.6 mL drops; Take 0.3 mLs (20 mg total) by mouth 4 (four) times daily as needed (gas and fussiness).  Dispense: 30 mL; Refill: 3        RECOMMENDATIONS:  Patient Instructions   Trial of Nutramigen and PurAmino.  [facility] for formula she does best with.    Thicken with Gelmix to nectar thick or Tapioca flour 1 tsp per ounce.  [employer] or [employer]  3.  Abdominal xray to assess stool burden.  Our girl is constipated.  She has a large ball of stool in her rectum for which I would have you do a cleanout:  Goal of milk shake to soft serve stools daily        4.  Mix formula to 22kcal/oz.  Handout provided.  5.  Infant Home Cleanout- 2-3 days  Day One  Milk of Magnesia 400mg/5mL  2mL three times a day for 2-3 days  Glycerin suppository sliver nightly   Senna 0.5mL nightly  Day Two  Milk of Magnesia 400mg/5mL  2mL three times a day for 2-3 days  Glycerin suppository sliver nightly   Senna 0.5mL nightly    Maintenance- D A I L Y  plan to keep stools soft and moving  Milk of Magnesia 1-2.5mL 1-2 times a day  +/- Senna 0.5 to 1mL nightly    G O A L:  One milk shake to soft serve stool daily to every other day.    Most if not all of these will be over the counter as they are not covered by insurance.  You will have to buy them yourself.  A prescription has been sent in, but the pharmacist will likely not have a prescription ready for pick-up.  They should be able to assist you in finding them on the shelves.   6.  Nutrition referral.  7.  Synagis today.  8.  Follow-up in 6 weeks.  9.  Call with questions or  concerns.    Follow up: Follow up in about 6 weeks (around 3/29/2023).       -------------------------------------------------------------------------------------------------------------------------------------------------------------------------------------------------------------------------------------------------------------  HPI  Lisy Chelseawin Medina is a 6 m.o. who was referred to me for malnutrition.      Abdominal Pain  The pain is described as colicky and grunting with defecation , and is screams like it is killing her, but she is consolable.  Pain is located in the  uncertain about the location.     Onset was  when she started on total comfort similac . Symptoms have been gradually worsening since. Aggravating factors: having a bowel movement.  Alleviating factors: having a bowel movement. Associated symptoms:no fevers, blood in her stool.  The pain wakes does not wake her from sleep.  The pain does not keep her from doing what she wants to do.     His of medical NEC in the NICU.  No surgery.  [facility].      Nausea & Vomiting  Patient complains of nausea and vomiting. Onset of symptoms was several weeks ago. Increasing due to teething. She likes to eat.  Many formula changes.  Neocate was the first formula.  She would not stool. They went to emergency room weekly for 3 weeks because she would not go.  Suppositories given along with gripe water.  Colic meds not poop meds.   Not fussy with spitting.  IN the NICU, she was on something for hematemesis.     Bowel Movements  Meconium passage was delayed due to prematurity.  Started having issues with constipation in the NICU.  No contrast enema.  Xrays.    Potty training: potty trained No.    Frequency:  once a week with grunting and straining.  [place]:  type 1/2.  Hard and large.  No tearing or bleeding.    Defication improves pain- yes.  Streaks and skid marks: yes  Withholding:  she  will poop at school if she needs to.   Straining:  Yes, describe:  grunting and screaming .  she endorses dyssynergia.  (Feeling like bottom won't relax to allow stool to come out.)    Enuresis:  no hematuria or enuresis.    she does have pain with defecation.  Defecation does improve her pain.    LIFESTYLE  Diet:    formula  Similac Total Comfort  q 4-5 hours without eating and only drink 2-3 ounces and not finish.  Thought it was due to nipples.  She making bottles by the can instruction.  They are adding 2 tsp per bottle with oatmeal since .    Sleep:  She is a better sleeper now.  She takes naps.  Developmental Activity:  Early steps referral.  No plans yet.    PMH  Past Medical History:   Diagnosis Date    Anemia of prematurity     Disturbance of cerebral status of      Extreme immaturity     gestational age 26 completed weeks    Heart murmur     Retinopathy of prematurity     Sickle cell trait     Stage 2 necrotizing enterocolitis in        History reviewed. No pertinent surgical history.  Family History   Problem Relation Age of Onset    Hypertension Father     Diabetes Father         pre-diabetic    Hypertension Maternal Grandmother     Cancer Maternal Grandmother         Thyroid    Hyperlipidemia Maternal Grandfather     Hypertension Maternal Grandfather     Diabetes Paternal Grandmother     Hypertension Paternal Grandmother     Diabetes Paternal Grandfather     Hypertension Paternal Grandfather     Diabetes Other     Pacemaker/defibrilator Other     Kidney failure Other     Heart failure Other     Stroke Other       There is no direct family history of IBD, EOE, Celiac disease.  PGM has endometriosis.  MGM has constipation and stomach issues.  Mother has issues with cows milk.  Mother has eczema.      Social History     Socioeconomic History    Marital status: Single   Tobacco Use    Smoking status: Never     Passive exposure: Never    Smokeless tobacco: Never   Substance and Sexual Activity    Alcohol use: Never    Drug use: Never    Sexual activity:  Never   Social History Narrative    Patient lives at home with mom and dad. No smokers in the house.      Review of patient's allergies indicates:  No Known Allergies    Current Outpatient Medications:     inf form,sp.met,lac fr,iron-GG (NUTRAMIGEN WITH ENFLORA LGG) 2.8-5.3-10.3 gram/100 kcal Powd, Take 3 oz by mouth every 3 (three) hours., Disp: , Rfl:     magnesium hydroxide 400 mg/5 ml (MILK OF MAGNESIA) 400 mg/5 mL Susp, Take 2.5 mLs (200 mg total) by mouth 2 (two) times a day., Disp: 150 mL, Rfl: 0    maltodextrin-carob (GELMIX) Powd, Take 1 packet by mouth 6 (six) times daily. Nectar thick, Disp: 125 g, Rfl: 3    pediatric multivitamin no.192 (POLY-VI-SOL ORAL), Take 1 mL by mouth., Disp: , Rfl:     simethicone (MYLICON) 40 mg/0.6 mL drops, Take 0.3 mLs (20 mg total) by mouth 4 (four) times daily as needed (gas and fussiness)., Disp: 30 mL, Rfl: 3    Current Facility-Administered Medications:     palivizumab injection 32 mg, 15 mg/kg, Intramuscular, Q28 Days, Bharati Davis MD, 68.4 mg at 02/15/23 0957      INVESTIGATIONS    Clinical Support on 2022   Component Date Value    BSA 2022 0.16    ]  No results found.     2/8/2023  KUB  Our girl is constipated.  She has a large ball of stool in her rectum for which I would have you do a cleanout:  Goal of milk shake to soft serve stools daily        Review of Systems   Constitutional:  Positive for crying.   HENT:  Positive for congestion and drooling (teething). Negative for trouble swallowing.    Eyes: Negative.    Respiratory: Negative.  Negative for choking and wheezing.    Cardiovascular:  Negative for sweating with feeds and cyanosis.   Gastrointestinal:  Positive for abdominal distention and constipation.   Genitourinary: Negative.    Musculoskeletal: Negative.    Skin:  Positive for rash (facial eczema and on arms and legs).   Allergic/Immunologic: Negative.    Neurological: Negative.    Hematological: Negative.    All other systems reviewed  "and are negative.   A comprehensive review of symptoms was completed and negative except as noted above.    OBJECTIVE:  Vital Signs:  Vitals:    02/15/23 0837   Temp: 97.9 °F (36.6 °C)   TempSrc: Axillary   Weight: 4.56 kg (10 lb 0.9 oz)   Height: 1' 9.06" (0.535 m)      <1 %ile (Z= -3.88) based on WHO (Girls, 0-2 years) weight-for-age data using vitals from 2/15/2023.   <1 %ile (Z= -5.34) based on WHO (Girls, 0-2 years) Length-for-age data based on Length recorded on 2/15/2023.  Body mass index is 15.93 kg/m². 26 %ile (Z= -0.66) based on WHO (Girls, 0-2 years) BMI-for-age based on BMI available as of 2/15/2023.  Blood pressure percentiles are not available for patients under the age of 1.  When Corrected for GA, her weight is -1.85 and Length is -2.86 (which may not be correct)    Physical Exam  Vitals and nursing note reviewed.   Constitutional:       General: She is active. She is not in acute distress.     Appearance: Normal appearance. She is well-developed. She is not toxic-appearing.      Comments: Petite, awake and alert   HENT:      Head: Normocephalic and atraumatic. Anterior fontanelle is flat.      Nose: Nose normal.      Mouth/Throat:      Mouth: Mucous membranes are moist.   Eyes:      Conjunctiva/sclera: Conjunctivae normal.      Pupils: Pupils are equal, round, and reactive to light.      Comments: Anicteric sclera   Cardiovascular:      Rate and Rhythm: Normal rate and regular rhythm.      Heart sounds: No murmur heard.  Pulmonary:      Effort: Pulmonary effort is normal.      Breath sounds: Normal breath sounds.   Abdominal:      General: Abdomen is flat. Bowel sounds are normal.      Palpations: Abdomen is soft.      Tenderness: There is no guarding or rebound.      Hernia: No hernia is present.   Genitourinary:     Comments: Normal female Malachi 1.  Normally placed anus.  Musculoskeletal:         General: Normal range of motion.   Skin:     General: Skin is warm and dry.      Capillary Refill: " Capillary refill takes less than 2 seconds.      Turgor: Normal.   Neurological:      General: No focal deficit present.      Mental Status: She is alert.      ____________________________________________    MD DAWNA Avendaño [facility] PEDIATRIC GASTROENTEROLOGY  [facility] [place] [facility] [place]   ____________________________________________

## 2023-02-15 NOTE — PROGRESS NOTES
CARDIOVASCULAR CONSULTATION    REASON FOR CONSULT:   Catalino Mcfadden is a 57 y.o. male who presents for for evaluation of hypertension.      HISTORY OF PRESENT ILLNESS:     Patient is a pleasant 54-year-old man.  Has been referred to me because of elevated blood pressure.  Is being followed by Podiatry also for ulcers on his feet.  States that the ulcers have been healing fine.  Denies any chest pains at rest on exertion, orthopnea, PND, swelling of feet.  ABIs were checked and were within normal limits.  States that lately has been eating a lot of salt in his diet and has not been eating well.  States that he checks his blood pressure at home and generally stays around 150-160 mm systolic.      The right ankle brachial index was 1.16 which is normal.   The left ankle brachial index was 1.27 which is normal.   The right TBI is 0.79.   The left TBI is 0.68.     1. Right lower extremity pressures and waveforms indicate no hemodynamically significant arterial occlusive disease.  2. Left lower extremity pressures and waveforms indicate no hemodynamically significant arterial occlusive disease.      Notes from February 2020:  Patient here for follow-up.  Denies any chest pains at rest on exertion, orthopnea, PND.  Blood pressure elevated in clinic today.  States has been compliant with his medications.  Denies any chest pain at rest on exertion, PND, headaches.  States that when it is a pressure medication sometimes he feels a little dizzy after taking the medications.  States that occasionally checks his blood pressure at when he gets dizzy and usually it is around 30-1 40/90 mm mercury.  However does not check his blood pressure regularly.      Notes from February 20th a 2020:  Patient here for follow-up.  Brings a blood pressure log from home.  Blood pressure much better controlled now.  At home blood pressure around 120-1 30/80 mm systolic.  Denies any symptoms of chest pains at rest on exertion, orthopnea, PND,  swelling of feet.      Concentric left ventricular remodeling.  Normal left ventricular systolic function. The estimated ejection fraction is 65%.  Normal LV diastolic function.  No wall motion abnormalities.  Normal right ventricular systolic function.  No pulmonary hypertension present.     Notes from October 2020:  Patient here for follow-up.  Denies any chest pains at rest on exertion, orthopnea, PND.  States that home blood pressure stays around 140-150 mm of mercury.  However has been taking his Coreg only once a day instead of twice a day as prescribed      Notes from February 2023: Patient here for follow-up after a long hiatus.  On dialysis.  Still has nonhealing ulcers of his feet.  States there healing up now.  Had some questions about his blood pressure medications which were answered.  Denies any orthopnea, PND, swelling of feet    PAST MEDICAL HISTORY:     Past Medical History:   Diagnosis Date    CKD (chronic kidney disease), stage III 07/16/2020    CKD stage 3 due to type 1 diabetes mellitus     CKD stage 3 due to type 2 diabetes mellitus     Diabetes mellitus, type 2     Diabetic foot ulcer associated with diabetes mellitus due to underlying condition 07/16/2020    Diabetic foot ulcers     Edema 08/03/2020    generalized, including genital area    Hyperlipidemia     Hypertension     Obese        PAST SURGICAL HISTORY:     Past Surgical History:   Procedure Laterality Date    BONE BIOPSY Left 7/17/2020    Procedure: BIOPSY, BONE;  Surgeon: Verona Whitmore DPM;  Location: [facility] OR;  Service: Podiatry;  Laterality: Left;    CERVICAL DISC SURGERY  07/11/2016    DEBRIDEMENT Left 7/17/2020    Procedure: DEBRIDEMENT;  Surgeon: Verona Whitmore DPM;  Location: [facility] OR;  Service: Podiatry;  Laterality: Left;    DEBRIDEMENT OF FOOT Right     toes & plantar surface    ESOPHAGOGASTRODUODENOSCOPY N/A 12/16/2022    Procedure: EGD (ESOPHAGOGASTRODUODENOSCOPY);  Surgeon: Eren Francois MD;  Location: [facility]  ENDO;  Service: Endoscopy;  Laterality: N/A;  Pt. on Dialysis. K+ ordered prior to procedure.EC    FRACTURE SURGERY Right     medial ankle, metal plate present    INSERTION OF TUNNELED CENTRAL VENOUS CATHETER (CVC) WITH SUBCUTANEOUS PORT N/A 6/11/2021    Procedure: TUNNEL CATH INSERTION WITHOUT PORT;  Surgeon: Jose Manuel Diagnostic Provider;  Location: [facility] OR;  Service: Radiology;  Laterality: N/A;  11AM START---PHONE PREOP 6/10/21---COVID POSTIVE ON 7/2020---NO S/S    left leg orthopedic surgery Left        ALLERGIES AND MEDICATION:     Review of patient's allergies indicates:  No Known Allergies     Medication List            Accurate as of February 15, 2023  3:25 PM. If you have any questions, ask your nurse or doctor.                CONTINUE taking these medications      chlorproMAZINE 25 MG tablet  Commonly known as: THORAZINE  Take 1 tablet (25 mg total) by mouth 3 (three) times daily. for 14 days     * doxycycline 100 MG tablet  Commonly known as: VIBRA-TABS  Take 1 tablet (100 mg total) by mouth 2 (two) times daily.     * doxycycline 100 MG Cap  Commonly known as: VIBRAMYCIN  Take 1 capsule (100 mg total) by mouth every 12 (twelve) hours.     esomeprazole 40 MG capsule  Commonly known as: NEXIUM  Take 1 capsule (40 mg total) by mouth 2 (two) times daily before meals.     gabapentin 100 MG capsule  Commonly known as: NEURONTIN  Take 1 capsule (100 mg total) by mouth 3 (three) times daily.     * GI cocktail antac/dicyc/lidoc  Swish and swallow 5 mLs 4 (four) times daily as needed (hiccups).     * GI cocktail antac/dicyc/lidoc  Swish and swallow 5 mLs 3 (three) times daily as needed (reflux).     heparin (porcine) 100 units 100 unit/100 mL (1 unit/mL) Solp in sodium chloride 0.45 % 100 mL infusion     metoclopramide HCl 5 MG tablet  Commonly known as: REGLAN  Take 1 tablet (5 mg total) by mouth 2 (two) times daily as needed (hiccups).     MIRCERA INJ     NIFEdipine 60 MG (OSM) 24 hr tablet  Commonly known as:  PROCARDIA XL  Take 1 tablet (60 mg total) by mouth once daily.     sevelamer carbonate 800 mg Tab  Commonly known as: RENVELA  Take 1 tablet (800 mg total) by mouth 3 (three) times daily with meals.     sodium chloride 0.9% SolP 100 mL with iron sucrose 100 mg iron/5 mL Soln 100 mg     torsemide 20 MG Tab  Commonly known as: DEMADEX  Take 1 tablet (20 mg total) by mouth once daily. Take once a day on non-HD days     TRULICITY 0.75 mg/0.5 mL pen injector  Generic drug: dulaglutide  INJECT 0.5 ML UNDER THE SKIN EVERY 7 DAYS           * This list has 4 medication(s) that are the same as other medications prescribed for you. Read the directions carefully, and ask your doctor or other care provider to review them with you.                  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     Social Determinants of Health     Financial Resource Strain: Low Risk     Difficulty of Paying Living Expenses: Not very hard   Food Insecurity: No Food Insecurity    Worried About Running Out of Food in the Last Year: Never true    Ran Out of Food in the Last Year: Never true   Transportation Needs: No Transportation Needs    Lack of Transportation (Medical): No    Lack of Transportation (Non-Medical): No   Physical Activity: Unknown    Days of Exercise per Week: Patient refused    Minutes of Exercise per Session: Patient refused   Stress: Stress Concern Present    Feeling of Stress : To some extent   Social Connections: Moderately Integrated    Frequency of Communication with Friends and Family: Three times a week    Frequency of Social Gatherings with Friends and Family: Three times a week    Attends  Tenri[employer] Services: 1 to 4 times per year    Active Member of Clubs or Organizations: No    Attends Club or Organization Meetings: Never    Marital Status:    Housing Stability: Low Risk     Unable to Pay for Housing in the Last Year: No    Number of Places Lived in the Last Year: 1    Unstable Housing in the Last Year: No       FAMILY HISTORY:     Family History   Problem Relation Age of Onset    Heart disease Father     Colon cancer Neg Hx     Esophageal cancer Neg Hx        REVIEW OF SYSTEMS:   Review of Systems   Constitutional: Negative.   HENT: Negative.     Eyes: Negative.    Respiratory: Negative.     Endocrine: Negative.    Hematologic/Lymphatic: Negative.    Skin:  Positive for poor wound healing.   Musculoskeletal: Negative.    Gastrointestinal: Negative.    Genitourinary: Negative.    Neurological: Negative.    Psychiatric/Behavioral: Negative.     Allergic/Immunologic: Negative.      A 10 point review of systems was performed and all the pertinent positives have been mentioned. Rest of review of systems was negative.        PHYSICAL EXAM:     Vitals:    02/15/23 1519   BP: (!) 145/82   Pulse: 99   Resp: 16    Body mass index is 31.38 kg/m².  Weight: 107.9 kg (237 lb 14 oz)         Physical Exam  Vitals reviewed.   Constitutional:       Appearance: He is well-developed.   HENT:      Head: Normocephalic.   Eyes:      Conjunctiva/sclera: Conjunctivae normal.      Pupils: Pupils are equal, round, and reactive to light.   Cardiovascular:      Rate and Rhythm: Normal rate and regular rhythm.      Heart sounds: Normal heart sounds.   Pulmonary:      Effort: Pulmonary effort is normal.      Breath sounds: Normal breath sounds.   Abdominal:      General: Bowel sounds are normal.      Palpations: Abdomen is soft.   Musculoskeletal:      Cervical back: Normal range of motion and neck supple.      Comments: Right foot in boot.   Skin:     General: Skin is warm.   Neurological:      Mental Status: He is alert  and oriented to person, place, and time.         DATA:     Laboratory:  CBC:  Recent Labs   Lab 02/09/22  0722 02/23/22  0758 09/24/22  1355 11/21/22  1207   WBC 4.99  4.99 4.09  --  7.29   Hemoglobin 11.7 L  11.7 L 11.7 L  --  11.5 L   POC Hematocrit  --   --  37  --    Hematocrit 37.3 L  37.3 L 37.0 L  --  34.2 L   Platelets 146 L  146 L 191  --  120 L         CHEMISTRIES:  Recent Labs   Lab 08/01/21  0335 08/02/21  0346 02/09/22  0722 02/23/22  0758 11/21/22  1207   Glucose 77 90 154 H  154 H 112 H 139 H   Sodium 135 L 131 L 132 L  132 L 135 L 133 L   Potassium 4.0 4.1 4.1  4.1 4.2 3.8   BUN 70 H 74 H 34 H  34 H 37 H 20   Creatinine 6.1 H 6.3 H 6.5 H  6.5 H 6.3 H 5.0 H   eGFR if  10.8 A 10.4 A 10.0 A  10.0 A 10.4 A  --    eGFR if non  9.3 A 9.0 A 8.7 A  8.7 A 9.0 A  --    Calcium 8.0 L 8.1 L 9.5  9.5 9.4 9.4   Magnesium 1.8 1.7  --   --  1.8         CARDIAC BIOMARKERS:  Recent Labs   Lab 08/04/20  1028 08/10/20  1545 07/27/21  1408 07/27/21  1650   CPK  --  43  --   --    Troponin I 0.110 H  --  0.116 H 0.092 H         COAGS:  Recent Labs   Lab 08/03/20  1703   INR 1.1         LIPIDS/LFTS:  Recent Labs   Lab 10/21/20  1414 06/04/21  1525 02/09/22  0722 02/23/22  0758 11/21/22  1207   Cholesterol 238 H  --  199 211 H  --    Triglycerides 98  --  63 61  --    HDL 56  --  63 62  --    LDL Cholesterol 162.4 H  --  123.4 136.8  --    Non-HDL Cholesterol 182  --  136 149  --    AST 32   < > 23  23 31 48 H   ALT 33   < > 18  18 25 25    < > = values in this interval not displayed.         Hemoglobin A1C   Date Value Ref Range Status   02/23/2022 5.0 4.0 - 5.6 % Final     Comment:     ADA Screening Guidelines:  5.7-6.4%  Consistent with prediabetes  >or=6.5%  Consistent with diabetes    High levels of fetal hemoglobin interfere with the HbA1C  assay. Heterozygous hemoglobin variants (HbS, HgC, etc)do  not significantly interfere with this assay.   However, presence of  multiple variants may affect accuracy.     02/09/2022 5.0 4.0 - 5.6 % Final     Comment:     ADA Screening Guidelines:  5.7-6.4%  Consistent with prediabetes  >or=6.5%  Consistent with diabetes    High levels of fetal hemoglobin interfere with the HbA1C  assay. Heterozygous hemoglobin variants (HbS, HgC, etc)do  not significantly interfere with this assay.   However, presence of multiple variants may affect accuracy.     07/28/2021 5.7 (H) 4.0 - 5.6 % Final     Comment:     ADA Screening Guidelines:  5.7-6.4%  Consistent with prediabetes  >or=6.5%  Consistent with diabetes    High levels of fetal hemoglobin interfere with the HbA1C  assay. Heterozygous hemoglobin variants (HbS, HgC, etc)do  not significantly interfere with this assay.   However, presence of multiple variants may affect accuracy.         TSH  Recent Labs   Lab 07/18/20  1652 08/03/20  1426 02/09/22  0722   TSH 0.104 L 0.472 1.780         The 10-year ASCVD risk score (Mikaela DOLAN, et al., 2019) is: 41%    Values used to calculate the score:      Age: 57 years      Sex: Male      Is Non- : Yes      Diabetic: Yes      Tobacco smoker: Yes      Systolic Blood Pressure: 145 mmHg      Is BP treated: Yes      HDL Cholesterol: 62 mg/dL      Total Cholesterol: 211 mg/dL             ASSESSMENT AND PLAN     Patient Active Problem List   Diagnosis    Type 2 diabetes mellitus with stage 5 chronic kidney disease    Essential hypertension    Hyperlipidemia, acquired    ED (erectile dysfunction)    History of diabetic ulcer of foot    Osteomyelitis of second toe of left foot    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    Long term (current) use of antibiotics    Acute osteomyelitis of metatarsal bone of left foot    Diabetic ulcer of toe of left foot    Hyponatremia    Acute kidney injury superimposed on chronic kidney disease    Osteomyelitis of left foot     Hypoalbuminemia    Healed ulcer of left foot on examination    Iron deficiency anemia    ESRD (end stage renal disease)    Metabolic acidosis    Dialysis patient    Anemia due to chronic kidney disease, on chronic dialysis    Diabetic ulcer of left midfoot associated with type 2 diabetes mellitus, with fat layer exposed    Type II diabetes mellitus with neurological manifestations    Foot ulcer, right, with fat layer exposed    Foot ulceration, left, with fat layer exposed       1.  Hypertension: Well controlled on current therapy.  Continue current therapy.    2D echocardiogram showed normal LV systolic function with mild LVH.    Concentric left ventricular remodeling.  Normal left ventricular systolic function. The estimated ejection fraction is 65%.  Normal LV diastolic function.  No wall motion abnormalities.  Normal right ventricular systolic function.  No pulmonary hypertension present.       2.  Chronic kidney disease:  On dialysis    3.  Recent COVID infection    4. Nonhealing ulcers.  Check peripheral ultrasound.  Follow-up in clinic after peripheral ultrasound      Thank you very much for involving me in the care of your patient.  Please do not hesitate to contact me if there are any questions.      Marylou Shaver MD, FACC, [facility]  Interventional Cardiologist, [facility].           This note was dictated with the help of speech recognition software.  There might be un-intended errors and/or substitutions.

## 2023-02-22 ENCOUNTER — HOSPITAL ENCOUNTER (INPATIENT)
Facility: HOSPITAL | Age: 58
LOS: 1 days | Discharge: HOME | DRG: 069 | End: 2023-02-22
Attending: EMERGENCY MEDICINE | Admitting: HOSPITALIST
Payer: COMMERCIAL

## 2023-02-22 VITALS
SYSTOLIC BLOOD PRESSURE: 189 MMHG | HEART RATE: 77 BPM | OXYGEN SATURATION: 100 % | TEMPERATURE: 98 F | RESPIRATION RATE: 18 BRPM | DIASTOLIC BLOOD PRESSURE: 84 MMHG | BODY MASS INDEX: 31.53 KG/M2 | WEIGHT: 239 LBS

## 2023-02-22 DIAGNOSIS — I10 HYPERTENSION, UNSPECIFIED TYPE: ICD-10-CM

## 2023-02-22 DIAGNOSIS — E87.1 HYPONATREMIA: Primary | ICD-10-CM

## 2023-02-22 DIAGNOSIS — R20.2 PARESTHESIAS: ICD-10-CM

## 2023-02-22 DIAGNOSIS — R07.9 CHEST PAIN: ICD-10-CM

## 2023-02-22 DIAGNOSIS — R26.0 ATAXIC GAIT: ICD-10-CM

## 2023-02-22 DIAGNOSIS — R42 DIZZINESS: ICD-10-CM

## 2023-02-22 PROBLEM — G45.9 TIA (TRANSIENT ISCHEMIC ATTACK): Status: ACTIVE | Noted: 2023-02-22

## 2023-02-22 PROBLEM — N17.9 ACUTE KIDNEY INJURY SUPERIMPOSED ON CHRONIC KIDNEY DISEASE: Status: RESOLVED | Noted: 2020-07-16 | Resolved: 2023-02-22

## 2023-02-22 PROBLEM — Z99.2 ESRD ON HEMODIALYSIS: Status: ACTIVE | Noted: 2023-02-22

## 2023-02-22 PROBLEM — N18.9 ACUTE KIDNEY INJURY SUPERIMPOSED ON CHRONIC KIDNEY DISEASE: Status: RESOLVED | Noted: 2020-07-16 | Resolved: 2023-02-22

## 2023-02-22 PROBLEM — N18.6 ESRD ON HEMODIALYSIS: Status: ACTIVE | Noted: 2023-02-22

## 2023-02-22 PROBLEM — I15.0 MALIGNANT RENOVASCULAR HYPERTENSION: Status: ACTIVE | Noted: 2023-02-22

## 2023-02-22 LAB
ALBUMIN SERPL BCP-MCNC: 2.7 G/DL (ref 3.5–5.2)
ALBUMIN SERPL BCP-MCNC: 3.1 G/DL (ref 3.5–5.2)
ALP SERPL-CCNC: 184 U/L (ref 55–135)
ALP SERPL-CCNC: 202 U/L (ref 55–135)
ALT SERPL W/O P-5'-P-CCNC: 12 U/L (ref 10–44)
ALT SERPL W/O P-5'-P-CCNC: 17 U/L (ref 10–44)
ANION GAP SERPL CALC-SCNC: 10 MMOL/L (ref 8–16)
ANION GAP SERPL CALC-SCNC: 12 MMOL/L (ref 8–16)
AST SERPL-CCNC: 20 U/L (ref 10–40)
AST SERPL-CCNC: 24 U/L (ref 10–40)
BASOPHILS # BLD AUTO: 0.02 K/UL (ref 0–0.2)
BASOPHILS # BLD AUTO: 0.04 K/UL (ref 0–0.2)
BASOPHILS NFR BLD: 0.4 % (ref 0–1.9)
BASOPHILS NFR BLD: 0.8 % (ref 0–1.9)
BILIRUB SERPL-MCNC: 0.6 MG/DL (ref 0.1–1)
BILIRUB SERPL-MCNC: 0.7 MG/DL (ref 0.1–1)
BUN SERPL-MCNC: 19 MG/DL (ref 6–20)
BUN SERPL-MCNC: 19 MG/DL (ref 6–20)
CALCIUM SERPL-MCNC: 8.6 MG/DL (ref 8.7–10.5)
CALCIUM SERPL-MCNC: 8.6 MG/DL (ref 8.7–10.5)
CHLORIDE SERPL-SCNC: 90 MMOL/L (ref 95–110)
CHLORIDE SERPL-SCNC: 92 MMOL/L (ref 95–110)
CHOLEST SERPL-MCNC: 177 MG/DL (ref 120–199)
CHOLEST/HDLC SERPL: 4.3 {RATIO} (ref 2–5)
CO2 SERPL-SCNC: 26 MMOL/L (ref 23–29)
CO2 SERPL-SCNC: 27 MMOL/L (ref 23–29)
CREAT SERPL-MCNC: 6.3 MG/DL (ref 0.5–1.4)
CREAT SERPL-MCNC: 6.6 MG/DL (ref 0.5–1.4)
DIFFERENTIAL METHOD: ABNORMAL
DIFFERENTIAL METHOD: ABNORMAL
EOSINOPHIL # BLD AUTO: 0.1 K/UL (ref 0–0.5)
EOSINOPHIL # BLD AUTO: 0.1 K/UL (ref 0–0.5)
EOSINOPHIL NFR BLD: 2.1 % (ref 0–8)
EOSINOPHIL NFR BLD: 2.5 % (ref 0–8)
ERYTHROCYTE [DISTWIDTH] IN BLOOD BY AUTOMATED COUNT: 13 % (ref 11.5–14.5)
ERYTHROCYTE [DISTWIDTH] IN BLOOD BY AUTOMATED COUNT: 13.2 % (ref 11.5–14.5)
EST. GFR  (NO RACE VARIABLE): 10 ML/MIN/1.73 M^2
EST. GFR  (NO RACE VARIABLE): 9 ML/MIN/1.73 M^2
ESTIMATED AVG GLUCOSE: 100 MG/DL (ref 68–131)
GLUCOSE SERPL-MCNC: 108 MG/DL (ref 70–110)
GLUCOSE SERPL-MCNC: 122 MG/DL (ref 70–110)
HBA1C MFR BLD: 5.1 % (ref 4–5.6)
HCT VFR BLD AUTO: 31.1 % (ref 40–54)
HCT VFR BLD AUTO: 32.5 % (ref 40–54)
HDLC SERPL-MCNC: 41 MG/DL (ref 40–75)
HDLC SERPL: 23.2 % (ref 20–50)
HGB BLD-MCNC: 10.3 G/DL (ref 14–18)
HGB BLD-MCNC: 11.1 G/DL (ref 14–18)
IMM GRANULOCYTES # BLD AUTO: 0.02 K/UL (ref 0–0.04)
IMM GRANULOCYTES # BLD AUTO: 0.02 K/UL (ref 0–0.04)
IMM GRANULOCYTES NFR BLD AUTO: 0.4 % (ref 0–0.5)
IMM GRANULOCYTES NFR BLD AUTO: 0.4 % (ref 0–0.5)
LDLC SERPL CALC-MCNC: 124.8 MG/DL (ref 63–159)
LYMPHOCYTES # BLD AUTO: 0.8 K/UL (ref 1–4.8)
LYMPHOCYTES # BLD AUTO: 0.8 K/UL (ref 1–4.8)
LYMPHOCYTES NFR BLD: 13.7 % (ref 18–48)
LYMPHOCYTES NFR BLD: 16.3 % (ref 18–48)
MAGNESIUM SERPL-MCNC: 1.7 MG/DL (ref 1.6–2.6)
MCH RBC QN AUTO: 27.8 PG (ref 27–31)
MCH RBC QN AUTO: 29 PG (ref 27–31)
MCHC RBC AUTO-ENTMCNC: 33.1 G/DL (ref 32–36)
MCHC RBC AUTO-ENTMCNC: 34.2 G/DL (ref 32–36)
MCV RBC AUTO: 84 FL (ref 82–98)
MCV RBC AUTO: 85 FL (ref 82–98)
MONOCYTES # BLD AUTO: 0.5 K/UL (ref 0.3–1)
MONOCYTES # BLD AUTO: 0.6 K/UL (ref 0.3–1)
MONOCYTES NFR BLD: 11.2 % (ref 4–15)
MONOCYTES NFR BLD: 11.6 % (ref 4–15)
NEUTROPHILS # BLD AUTO: 3.3 K/UL (ref 1.8–7.7)
NEUTROPHILS # BLD AUTO: 4 K/UL (ref 1.8–7.7)
NEUTROPHILS NFR BLD: 69.2 % (ref 38–73)
NEUTROPHILS NFR BLD: 71.4 % (ref 38–73)
NONHDLC SERPL-MCNC: 136 MG/DL
NRBC BLD-RTO: 0 /100 WBC
NRBC BLD-RTO: 0 /100 WBC
PHOSPHATE SERPL-MCNC: 3.5 MG/DL (ref 2.7–4.5)
PLATELET # BLD AUTO: 129 K/UL (ref 150–450)
PLATELET # BLD AUTO: 138 K/UL (ref 150–450)
PMV BLD AUTO: 9.5 FL (ref 9.2–12.9)
PMV BLD AUTO: 9.5 FL (ref 9.2–12.9)
POCT GLUCOSE: 117 MG/DL (ref 70–110)
POCT GLUCOSE: 96 MG/DL (ref 70–110)
POTASSIUM SERPL-SCNC: 3.5 MMOL/L (ref 3.5–5.1)
POTASSIUM SERPL-SCNC: 3.6 MMOL/L (ref 3.5–5.1)
PROT SERPL-MCNC: 6.8 G/DL (ref 6–8.4)
PROT SERPL-MCNC: 7.6 G/DL (ref 6–8.4)
RBC # BLD AUTO: 3.71 M/UL (ref 4.6–6.2)
RBC # BLD AUTO: 3.83 M/UL (ref 4.6–6.2)
SODIUM SERPL-SCNC: 128 MMOL/L (ref 136–145)
SODIUM SERPL-SCNC: 129 MMOL/L (ref 136–145)
TRIGL SERPL-MCNC: 56 MG/DL (ref 30–150)
WBC # BLD AUTO: 4.72 K/UL (ref 3.9–12.7)
WBC # BLD AUTO: 5.53 K/UL (ref 3.9–12.7)

## 2023-02-22 PROCEDURE — 83735 ASSAY OF MAGNESIUM: CPT | Performed by: INTERNAL MEDICINE

## 2023-02-22 PROCEDURE — 25000003 PHARM REV CODE 250: Performed by: INTERNAL MEDICINE

## 2023-02-22 PROCEDURE — 82962 GLUCOSE BLOOD TEST: CPT

## 2023-02-22 PROCEDURE — 80053 COMPREHEN METABOLIC PANEL: CPT | Performed by: EMERGENCY MEDICINE

## 2023-02-22 PROCEDURE — 80053 COMPREHEN METABOLIC PANEL: CPT | Mod: 91 | Performed by: INTERNAL MEDICINE

## 2023-02-22 PROCEDURE — 63600175 PHARM REV CODE 636 W HCPCS: Performed by: EMERGENCY MEDICINE

## 2023-02-22 PROCEDURE — 25000003 PHARM REV CODE 250: Performed by: EMERGENCY MEDICINE

## 2023-02-22 PROCEDURE — 85025 COMPLETE CBC W/AUTO DIFF WBC: CPT | Mod: 91 | Performed by: INTERNAL MEDICINE

## 2023-02-22 PROCEDURE — 85025 COMPLETE CBC W/AUTO DIFF WBC: CPT | Performed by: EMERGENCY MEDICINE

## 2023-02-22 PROCEDURE — 99285 EMERGENCY DEPT VISIT HI MDM: CPT | Mod: 25

## 2023-02-22 PROCEDURE — 84100 ASSAY OF PHOSPHORUS: CPT | Performed by: INTERNAL MEDICINE

## 2023-02-22 PROCEDURE — 12000002 HC ACUTE/MED SURGE SEMI-PRIVATE ROOM

## 2023-02-22 PROCEDURE — 63600175 PHARM REV CODE 636 W HCPCS: Performed by: INTERNAL MEDICINE

## 2023-02-22 PROCEDURE — 80061 LIPID PANEL: CPT | Performed by: INTERNAL MEDICINE

## 2023-02-22 PROCEDURE — 96374 THER/PROPH/DIAG INJ IV PUSH: CPT

## 2023-02-22 PROCEDURE — 83036 HEMOGLOBIN GLYCOSYLATED A1C: CPT | Performed by: INTERNAL MEDICINE

## 2023-02-22 RX ORDER — NAPROXEN SODIUM 220 MG/1
81 TABLET, FILM COATED ORAL DAILY
Status: DISCONTINUED | OUTPATIENT
Start: 2023-02-23 | End: 2023-02-22 | Stop reason: HOSPADM

## 2023-02-22 RX ORDER — SEVELAMER CARBONATE 800 MG/1
800 TABLET, FILM COATED ORAL
Status: DISCONTINUED | OUTPATIENT
Start: 2023-02-22 | End: 2023-02-22 | Stop reason: HOSPADM

## 2023-02-22 RX ORDER — DEXTROSE 40 %
30 GEL (GRAM) ORAL
Status: DISCONTINUED | OUTPATIENT
Start: 2023-02-22 | End: 2023-02-22 | Stop reason: HOSPADM

## 2023-02-22 RX ORDER — PROCHLORPERAZINE EDISYLATE 5 MG/ML
5 INJECTION INTRAMUSCULAR; INTRAVENOUS EVERY 6 HOURS PRN
Status: DISCONTINUED | OUTPATIENT
Start: 2023-02-22 | End: 2023-02-22 | Stop reason: HOSPADM

## 2023-02-22 RX ORDER — HYDRALAZINE HYDROCHLORIDE 25 MG/1
75 TABLET, FILM COATED ORAL ONCE
Status: COMPLETED | OUTPATIENT
Start: 2023-02-22 | End: 2023-02-22

## 2023-02-22 RX ORDER — HYDRALAZINE HYDROCHLORIDE 50 MG/1
50 TABLET, FILM COATED ORAL 3 TIMES DAILY
COMMUNITY
End: 2023-02-22

## 2023-02-22 RX ORDER — TALC
6 POWDER (GRAM) TOPICAL NIGHTLY PRN
Status: DISCONTINUED | OUTPATIENT
Start: 2023-02-22 | End: 2023-02-22 | Stop reason: HOSPADM

## 2023-02-22 RX ORDER — NALOXONE HCL 0.4 MG/ML
0.02 VIAL (ML) INJECTION
Status: DISCONTINUED | OUTPATIENT
Start: 2023-02-22 | End: 2023-02-22 | Stop reason: HOSPADM

## 2023-02-22 RX ORDER — ACETAMINOPHEN 325 MG/1
650 TABLET ORAL EVERY 6 HOURS PRN
Status: DISCONTINUED | OUTPATIENT
Start: 2023-02-22 | End: 2023-02-22 | Stop reason: HOSPADM

## 2023-02-22 RX ORDER — TORSEMIDE 10 MG/1
20 TABLET ORAL DAILY
Status: DISCONTINUED | OUTPATIENT
Start: 2023-02-22 | End: 2023-02-22 | Stop reason: HOSPADM

## 2023-02-22 RX ORDER — CLONIDINE HYDROCHLORIDE 0.1 MG/1
0.2 TABLET ORAL EVERY 4 HOURS PRN
Status: DISCONTINUED | OUTPATIENT
Start: 2023-02-22 | End: 2023-02-22 | Stop reason: HOSPADM

## 2023-02-22 RX ORDER — SIMETHICONE 80 MG
1 TABLET,CHEWABLE ORAL 4 TIMES DAILY PRN
Status: DISCONTINUED | OUTPATIENT
Start: 2023-02-22 | End: 2023-02-22 | Stop reason: HOSPADM

## 2023-02-22 RX ORDER — INSULIN ASPART 100 [IU]/ML
1-10 INJECTION, SOLUTION INTRAVENOUS; SUBCUTANEOUS
Status: DISCONTINUED | OUTPATIENT
Start: 2023-02-22 | End: 2023-02-22 | Stop reason: HOSPADM

## 2023-02-22 RX ORDER — AMOXICILLIN 250 MG
1 CAPSULE ORAL 2 TIMES DAILY PRN
Status: DISCONTINUED | OUTPATIENT
Start: 2023-02-22 | End: 2023-02-22 | Stop reason: HOSPADM

## 2023-02-22 RX ORDER — POLYETHYLENE GLYCOL 3350 17 G/17G
17 POWDER, FOR SOLUTION ORAL DAILY
Status: DISCONTINUED | OUTPATIENT
Start: 2023-02-22 | End: 2023-02-22 | Stop reason: HOSPADM

## 2023-02-22 RX ORDER — OXYCODONE HYDROCHLORIDE 5 MG/1
5 TABLET ORAL EVERY 6 HOURS PRN
Status: DISCONTINUED | OUTPATIENT
Start: 2023-02-22 | End: 2023-02-22 | Stop reason: HOSPADM

## 2023-02-22 RX ORDER — HYDRALAZINE HYDROCHLORIDE 100 MG/1
100 TABLET, FILM COATED ORAL 3 TIMES DAILY
Qty: 90 TABLET | Refills: 0 | Status: SHIPPED | OUTPATIENT
Start: 2023-02-22 | End: 2024-03-28 | Stop reason: SDUPTHER

## 2023-02-22 RX ORDER — GLUCAGON 1 MG
1 KIT INJECTION
Status: DISCONTINUED | OUTPATIENT
Start: 2023-02-22 | End: 2023-02-22 | Stop reason: HOSPADM

## 2023-02-22 RX ORDER — HEPARIN SODIUM 5000 [USP'U]/ML
5000 INJECTION, SOLUTION INTRAVENOUS; SUBCUTANEOUS EVERY 8 HOURS
Status: DISCONTINUED | OUTPATIENT
Start: 2023-02-22 | End: 2023-02-22 | Stop reason: HOSPADM

## 2023-02-22 RX ORDER — DEXTROSE 40 %
15 GEL (GRAM) ORAL
Status: DISCONTINUED | OUTPATIENT
Start: 2023-02-22 | End: 2023-02-22 | Stop reason: HOSPADM

## 2023-02-22 RX ORDER — GABAPENTIN 100 MG/1
100 CAPSULE ORAL 3 TIMES DAILY
Status: DISCONTINUED | OUTPATIENT
Start: 2023-02-22 | End: 2023-02-22 | Stop reason: HOSPADM

## 2023-02-22 RX ORDER — NIFEDIPINE 30 MG/1
60 TABLET, EXTENDED RELEASE ORAL ONCE
Status: DISCONTINUED | OUTPATIENT
Start: 2023-02-22 | End: 2023-02-22

## 2023-02-22 RX ORDER — ATORVASTATIN CALCIUM 40 MG/1
40 TABLET, FILM COATED ORAL DAILY
Status: DISCONTINUED | OUTPATIENT
Start: 2023-02-22 | End: 2023-02-22 | Stop reason: HOSPADM

## 2023-02-22 RX ORDER — ONDANSETRON 2 MG/ML
4 INJECTION INTRAMUSCULAR; INTRAVENOUS EVERY 8 HOURS PRN
Status: DISCONTINUED | OUTPATIENT
Start: 2023-02-22 | End: 2023-02-22 | Stop reason: HOSPADM

## 2023-02-22 RX ORDER — PANTOPRAZOLE SODIUM 40 MG/1
40 TABLET, DELAYED RELEASE ORAL DAILY
Status: DISCONTINUED | OUTPATIENT
Start: 2023-02-22 | End: 2023-02-22 | Stop reason: HOSPADM

## 2023-02-22 RX ORDER — HYDRALAZINE HYDROCHLORIDE 25 MG/1
100 TABLET, FILM COATED ORAL 3 TIMES DAILY
Status: DISCONTINUED | OUTPATIENT
Start: 2023-02-22 | End: 2023-02-22 | Stop reason: HOSPADM

## 2023-02-22 RX ORDER — SODIUM CHLORIDE 0.9 % (FLUSH) 0.9 %
10 SYRINGE (ML) INJECTION EVERY 12 HOURS PRN
Status: DISCONTINUED | OUTPATIENT
Start: 2023-02-22 | End: 2023-02-22 | Stop reason: HOSPADM

## 2023-02-22 RX ORDER — ASPIRIN 325 MG
325 TABLET ORAL ONCE
Status: COMPLETED | OUTPATIENT
Start: 2023-02-22 | End: 2023-02-22

## 2023-02-22 RX ORDER — ATORVASTATIN CALCIUM 40 MG/1
40 TABLET, FILM COATED ORAL DAILY
Qty: 30 TABLET | Refills: 0 | Status: ON HOLD | OUTPATIENT
Start: 2023-02-23 | End: 2023-12-12 | Stop reason: HOSPADM

## 2023-02-22 RX ORDER — NIFEDIPINE 30 MG/1
60 TABLET, EXTENDED RELEASE ORAL DAILY
Status: DISCONTINUED | OUTPATIENT
Start: 2023-02-22 | End: 2023-02-22 | Stop reason: HOSPADM

## 2023-02-22 RX ORDER — NAPROXEN SODIUM 220 MG/1
81 TABLET, FILM COATED ORAL DAILY
Qty: 30 TABLET | Refills: 0 | Status: ON HOLD | OUTPATIENT
Start: 2023-02-23 | End: 2023-12-12

## 2023-02-22 RX ORDER — HYDRALAZINE HYDROCHLORIDE 25 MG/1
50 TABLET, FILM COATED ORAL 3 TIMES DAILY
Status: DISCONTINUED | OUTPATIENT
Start: 2023-02-22 | End: 2023-02-22

## 2023-02-22 RX ORDER — INSULIN ASPART 100 [IU]/ML
0-5 INJECTION, SOLUTION INTRAVENOUS; SUBCUTANEOUS
Status: DISCONTINUED | OUTPATIENT
Start: 2023-02-22 | End: 2023-02-22 | Stop reason: HOSPADM

## 2023-02-22 RX ORDER — PROCHLORPERAZINE EDISYLATE 5 MG/ML
10 INJECTION INTRAMUSCULAR; INTRAVENOUS ONCE
Status: COMPLETED | OUTPATIENT
Start: 2023-02-22 | End: 2023-02-22

## 2023-02-22 RX ADMIN — NIFEDIPINE 60 MG: 30 TABLET, FILM COATED, EXTENDED RELEASE ORAL at 06:02

## 2023-02-22 RX ADMIN — PROCHLORPERAZINE EDISYLATE 10 MG: 5 INJECTION INTRAMUSCULAR; INTRAVENOUS at 04:02

## 2023-02-22 RX ADMIN — HEPARIN SODIUM 5000 UNITS: 5000 INJECTION INTRAVENOUS; SUBCUTANEOUS at 07:02

## 2023-02-22 RX ADMIN — ASPIRIN 325 MG ORAL TABLET 325 MG: 325 PILL ORAL at 06:02

## 2023-02-22 RX ADMIN — ATORVASTATIN CALCIUM 40 MG: 40 TABLET, FILM COATED ORAL at 06:02

## 2023-02-22 RX ADMIN — HYDRALAZINE HYDROCHLORIDE 75 MG: 25 TABLET, FILM COATED ORAL at 04:02

## 2023-02-22 NOTE — HOSPITAL COURSE
"Mr. Mcfadden is a 58yo man with a past medical history of hiccoughs, ESRD on HD, DM2, foot ulcers, HTN, and obesity.  TTE on 7/28/21 showed normal EF and normal diastolic function.  He does HD on T, Th, Sat at [facility] (done yesterday).  He dialyzes via right IJ tunnel catheter (no AVF yet).     After HD yesterday, he was very fatigued so he drove to work and fell asleep.  He awoke at 2:30pm and found his right arm and leg to be, "numb like you're at the dentist or something."  He got up to work and found he had vertigo as well.  Despite this, he went ahead and did his work shift as a .     Later tonight he finally became concerned, despite the symptoms gradually resolving, and decided, "I might just need to go get this checked out."  He never had any headache, visual changes or focal weakness.  His paresthesias have completely resolved now except for a little numbness to his right fingers.     In the ED his VS were BP (!) 168/87 -> 211/103 -> 220/102 -> 179/85   Pulse 77   Temp 98.1 °F (36.7 °C)   Resp 18   Wt 108.4 kg (239 lb)   SpO2 100% RA  BMI 31.53 kg/m².  CT head showed no CT evidence of acute intracranial abnormality.  Head CT in ER show no acute process,his symptoms after controlling blood pressure quickly resolved,he say he was laying on his right arm  and after that numbness happen,on my exam no sign of focal neuro deficiency,all symptoms resolved,patient was discharged home with higher blood pressure medications and follow up with PCP as out patient.  "

## 2023-02-22 NOTE — ASSESSMENT & PLAN NOTE
Continue home meds for now:       hydrALAZINE tablet 50 mg, 50 mg, Oral, TID     NIFEdipine 24 hr tablet 60 mg, 60 mg, Oral, Daily

## 2023-02-22 NOTE — ASSESSMENT & PLAN NOTE
Acute onset of vertigo with right sided paresthesias, now resolved  His BP has been elevated, so not sure if reactive due to CVA, or symptoms primary to severe HTN  MRI and MRA brain  ASA 325mg po x 1, then 81mg po qday  Lipitor 40mg po qday  Neuro checks q4, tele

## 2023-02-22 NOTE — ASSESSMENT & PLAN NOTE
Consult Nephrology  He does HD on T, Th, Sat at [facility] (done yesterday).    He dialyzes via right IJ tunnel catheter (no AVF yet).

## 2023-02-22 NOTE — ED PROVIDER NOTES
Encounter Date: 2/22/2023    SCRIBE #1 NOTE: I, Kash Singh, am scribing for, and in the presence of,  Vikas Lee MD. I have scribed the following portions of the note - Other sections scribed: HPI, ROS, PE.     History     Chief Complaint   Patient presents with    Extremity Weakness     Pt had dialysis earlier today and complaining or rt arm and leg tingling, no unilateral weakness, equal strength and sensation, no facial droop or aphasia     Catalino Mcfadden is a 57 y.o male with a PMHx of CKD stage III, dialysis, DM type 2, HLD, and HTN, that presents to the ED for evaluation of right sided paresthesia beginning around 3 PM yesterday. Patient reports he was asleep in his car waiting for work after dialysis when he woke up to paresthesias to his right upper and lower extremities. Patient states his symptoms have improved since onset. Patient additionally reports a wound to his right foot, endorsing he follows up with wound care. Patient reports he still makes urine. No medications taken PTA. No alleviating or exacerbating factors noted. Denies nausea, ear pain, dysuria, hematuria, neck pain, headache, or other associated symptoms. Patient additionally reports he has been dealing with chronic belching for 8-9 months, stating he follows up with GI.    The history is provided by the patient. No  was used.   Review of patient's allergies indicates:  No Known Allergies  Past Medical History:   Diagnosis Date    Diabetes mellitus, type 2     Diabetic foot ulcer associated with diabetes mellitus due to underlying condition 07/16/2020    ESRD on hemodialysis     Hyperlipidemia     Hypertension     Obese      Past Surgical History:   Procedure Laterality Date    BONE BIOPSY Left 7/17/2020    Procedure: BIOPSY, BONE;  Surgeon: Verona Whitmore DPM;  Location: [facility];  Service: Podiatry;  Laterality: Left;    CERVICAL DISC SURGERY  07/11/2016    DEBRIDEMENT Left 7/17/2020    Procedure: DEBRIDEMENT;   Surgeon: Verona Whitmore DPM;  Location: [facility] OR;  Service: Podiatry;  Laterality: Left;    DEBRIDEMENT OF FOOT Right     toes & plantar surface    ESOPHAGOGASTRODUODENOSCOPY N/A 12/16/2022    Procedure: EGD (ESOPHAGOGASTRODUODENOSCOPY);  Surgeon: Eren Francois MD;  Location: [facility] ENDO;  Service: Endoscopy;  Laterality: N/A;  Pt. on Dialysis. K+ ordered prior to procedure.EC    FRACTURE SURGERY Right     medial ankle, metal plate present    INSERTION OF TUNNELED CENTRAL VENOUS CATHETER (CVC) WITH SUBCUTANEOUS PORT N/A 6/11/2021    Procedure: TUNNEL CATH INSERTION WITHOUT PORT;  Surgeon: Dosc Diagnostic Provider;  Location: [facility] OR;  Service: Radiology;  Laterality: N/A;  11AM START---PHONE PREOP 6/10/21---COVID POSTIVE ON 7/2020---NO S/S    left leg orthopedic surgery Left      Family History   Problem Relation Age of Onset    Heart disease Father     Colon cancer Neg Hx     Esophageal cancer Neg Hx      Social History     Tobacco Use    Smoking status: Some Days     Types: Cigars    Smokeless tobacco: Never   Substance Use Topics    Alcohol use: Yes     Comment: social    Drug use: No     Review of Systems   Constitutional:  Negative for chills and fever.   HENT:  Negative for congestion and ear pain.    Respiratory:  Negative for cough and shortness of breath.    Gastrointestinal:  Negative for abdominal pain, diarrhea and nausea.   Genitourinary:  Negative for dysuria.   Musculoskeletal:  Negative for back pain and neck pain.   Skin:  Negative for rash.   Neurological:  Negative for weakness, numbness and headaches.        (+) paresthesia to right upper and lower extremities.     Physical Exam     Initial Vitals   BP Pulse Resp Temp SpO2   02/22/23 0243 02/22/23 0231 02/22/23 0231 02/22/23 0231 02/22/23 0231   (!) 168/87 91 18 98.1 °F (36.7 °C) 95 %      MAP       --                Physical Exam    Nursing note and vitals reviewed.  Constitutional: He appears well-developed. He is not diaphoretic. No  distress.   HENT:   Head: Normocephalic.   Right Ear: No middle ear effusion.   Left Ear:  No middle ear effusion.   Eyes: EOM are normal. Pupils are equal, round, and reactive to light. Right eye exhibits no nystagmus. Left eye exhibits no nystagmus.   Pupils are 2 mm bilaterally.    Cardiovascular:  Normal rate and regular rhythm.           No murmur heard.  Pulses:       Dorsalis pedis pulses are 2+ on the right side and 2+ on the left side.   Pulmonary/Chest: Effort normal and breath sounds normal. He has no wheezes.   Abdominal: Abdomen is soft. He exhibits no distension. There is no abdominal tenderness.   Musculoskeletal:         General: Normal range of motion.      Comments: Callus to the left foot. No open wound noted.   Small abrasion to the 1st MTP on the right noted.     Neurological: He is alert.   No facial asymmetry.   No pronator drift.     Skin: Skin is warm.       ED Course   Procedures  Labs Reviewed   CBC W/ AUTO DIFFERENTIAL - Abnormal; Notable for the following components:       Result Value    RBC 3.83 (*)     Hemoglobin 11.1 (*)     Hematocrit 32.5 (*)     Platelets 138 (*)     Lymph # 0.8 (*)     Lymph % 13.7 (*)     All other components within normal limits   COMPREHENSIVE METABOLIC PANEL - Abnormal; Notable for the following components:    Sodium 128 (*)     Chloride 90 (*)     Glucose 122 (*)     Creatinine 6.3 (*)     Calcium 8.6 (*)     Albumin 3.1 (*)     Alkaline Phosphatase 202 (*)     eGFR 10 (*)     All other components within normal limits   POCT GLUCOSE - Abnormal; Notable for the following components:    POCT Glucose 117 (*)     All other components within normal limits   COMPREHENSIVE METABOLIC PANEL   MAGNESIUM   PHOSPHORUS   CBC W/ AUTO DIFFERENTIAL   HEMOGLOBIN A1C   LIPID PANEL   POCT GLUCOSE MONITORING CONTINUOUS          Imaging Results              CT Head Without Contrast (Final result)  Result time 02/22/23 03:44:06      Final result by Ileana Perkins MD (02/22/23  03:44:06)                   Impression:      No CT evidence of acute intracranial abnormality.  If there is clinical concern for acute ischemia, further assessment with MRI is advised if there are no clinical contraindications.      Electronically signed by: Ileana Perkins MD  Date:    02/22/2023  Time:    03:44               Narrative:    EXAMINATION:  CT HEAD WITHOUT CONTRAST    CLINICAL HISTORY:  paresthesias right sided;    TECHNIQUE:  Low dose axial images were obtained through the head.  Coronal and sagittal reformations were also performed. Contrast was not administered.    COMPARISON:  None.    FINDINGS:  There is no acute intracranial hemorrhage, hydrocephalus, midline shift or mass effect. Gray-white matter differentiation appears maintained noting minimal microangiopathic change.  The basal cisterns are patent. There is lobular mucosal thickening of the right maxillary sinus suggestive of mucous retention cyst or polyp.  The remaining visualized paranasal sinuses and mastoid air cells are clear of acute process.  The visualized bones of the calvarium demonstrate no acute osseous abnormality.                                       Medications   pantoprazole EC tablet 40 mg (has no administration in time range)   gabapentin capsule 100 mg (has no administration in time range)   hydrALAZINE tablet 50 mg (has no administration in time range)   sevelamer carbonate tablet 800 mg (has no administration in time range)   torsemide tablet 20 mg (has no administration in time range)   sodium chloride 0.9% flush 10 mL (has no administration in time range)   naloxone 0.4 mg/mL injection 0.02 mg (has no administration in time range)   heparin (porcine) injection 5,000 Units (has no administration in time range)   acetaminophen tablet 650 mg (has no administration in time range)   oxyCODONE immediate release tablet 5 mg (has no administration in time range)   senna-docusate 8.6-50 mg per tablet 1 tablet (has no  administration in time range)   polyethylene glycol packet 17 g (has no administration in time range)   ondansetron injection 4 mg (has no administration in time range)   prochlorperazine injection Soln 5 mg (has no administration in time range)   melatonin tablet 6 mg (has no administration in time range)   simethicone chewable tablet 80 mg (has no administration in time range)   insulin aspart U-100 pen 0-5 Units (has no administration in time range)   glucagon (human recombinant) injection 1 mg (has no administration in time range)   dextrose 10% bolus 125 mL 125 mL (has no administration in time range)   dextrose 10% bolus 250 mL 250 mL (has no administration in time range)   dextrose 40 % gel 15,000 mg (has no administration in time range)   dextrose 40 % gel 30,000 mg (has no administration in time range)   insulin aspart U-100 pen 1-10 Units (has no administration in time range)   aspirin tablet 325 mg (has no administration in time range)   aspirin chewable tablet 81 mg (has no administration in time range)   atorvastatin tablet 40 mg (has no administration in time range)   NIFEdipine 24 hr tablet 60 mg (has no administration in time range)   hydrALAZINE tablet 75 mg (75 mg Oral Given 2/22/23 0424)   prochlorperazine injection Soln 10 mg (10 mg Intravenous Given 2/22/23 0440)     Medical Decision Making:   History:   Old Medical Records: I decided to obtain old medical records.  Initial Assessment:     57-year-old male presenting with reported dizziness and paresthesias.  History of ESRD and received dialysis earlier today.  Results revealing sodium of 128.  No neuro deficit on exam.  No nystagmus or dysmetria.  Suspect symptoms secondary to hyponatremia.  Differential also includes possible CVA however lower suspicion given no cranial nerve or extremity nerve deficit.  Differential Diagnosis:     Hyponatremia with ataxia, CVA  Clinical Tests:   Lab Tests: Ordered and Reviewed  Radiological Study: Ordered  and Reviewed  Medical Tests: Ordered and Reviewed  ED Management:    Problems considered includes dizziness (Signs & symptoms, differentials)  Chronic problems impacting care includes ESRD.  Please see workup section for emergency physician independent ECG interpretation.  Imaging independently reviewed.  Agree with radiologist's interpretation. Imaging includes no ICH or mass noted on CT imaging.  All labs reviewed and considered in the patient's differential diagnosis. Discussion of labs includes sodium of 128.  Decision regarding hospitalization.  The patient requires additional care in the hospital overnight.   Dr. Quintanilla with hospital medicine will accept the patient. Labs, imaging, or procedures were discussed.    Plan was discussed with the patient.  This includes plan for admission, labs, imaging results.    All questions or concerns have been addressed.         Scribe Attestation:   Scribe #1: I performed the above scribed service and the documentation accurately describes the services I performed. I attest to the accuracy of the note.      ED Course as of 02/22/23 0555   Wed Feb 22, 2023   0331 Sodium(!): 128 [JM]      ED Course User Index  [JM] Vikas Lee MD                 Clinical Impression:   Final diagnoses:  [E87.1] Hyponatremia (Primary)  [R42] Dizziness  [R20.2] Paresthesias  [R26.0] Ataxic gait  [I10] Hypertension, unspecified type        ED Disposition Condition    Admit Stable               I, Vikas Lee, personally performed the services described in this documentation. All medical record entries made by the scribe were at my direction and in my presence. I have reviewed the chart and agree that the record reflects my personal performance and is accurate and complete.       Vikas Lee MD  02/22/23 0555

## 2023-02-22 NOTE — H&P
"[facility] [place] [facility]  [facility] Medicine  History & Physical    Patient Name: Catalino Mcfadden  MRN: [id number]  Patient Class: IP- Inpatient  Admission Date: 2/22/2023  Attending Physician: Laura Crowder MD   Primary Care Provider: Azikiwe K Lombard, MD (Inactive)         Patient information was obtained from patient, past medical records and ER records.     Subjective:     Principal Problem:TIA (transient ischemic attack)    Chief Complaint:   Chief Complaint   Patient presents with    Extremity Weakness     Pt had dialysis earlier today and complaining or rt arm and leg tingling, no unilateral weakness, equal strength and sensation, no facial droop or aphasia        HPI:   Mr. Mcfadden is a 56yo man with a past medical history of hiccoughs, ESRD on HD, DM2, foot ulcers, HTN, and obesity.  TTE on 7/28/21 showed normal EF and normal diastolic function.  He does HD on T, Th, Sat at [facility] (done yesterday).  He dialyzes via right IJ tunnel catheter (no AVF yet).    After HD yesterday, he was very fatigued so he drove to work and fell asleep.  He awoke at 2:30pm and found his right arm and leg to be, "numb like you're at the dentist or something."  He got up to work and found he had vertigo as well.  Despite this, he went ahead and did his work shift as a .    Later tonight he finally became concerned, despite the symptoms gradually resolving, and decided, "I might just need to go get this checked out."  He never had any headache, visual changes or focal weakness.  His paresthesias have completely resolved now except for a little numbness to his right fingers.    In the ED his VS were BP (!) 168/87 -> 211/103 -> 220/102 -> 179/85   Pulse 77   Temp 98.1 °F (36.7 °C)   Resp 18   Wt 108.4 kg (239 lb)   SpO2 100% RA  BMI 31.53 kg/m².  CT head showed no CT evidence of acute intracranial abnormality.    In the ED he was treated with:  Medications   NIFEdipine 24 hr tablet 60 mg (has no administration in " time range)   hydrALAZINE tablet 75 mg (75 mg Oral Given 2/22/23 7546)   prochlorperazine injection Soln 10 mg (10 mg Intravenous Given 2/22/23 2254)             Past Medical History:   Diagnosis Date    Diabetes mellitus, type 2     Diabetic foot ulcer associated with diabetes mellitus due to underlying condition 07/16/2020    ESRD on hemodialysis     Hyperlipidemia     Hypertension     Obese        Past Surgical History:   Procedure Laterality Date    BONE BIOPSY Left 7/17/2020    Procedure: BIOPSY, BONE;  Surgeon: Verona Whitmore DPM;  Location: [facility] OR;  Service: Podiatry;  Laterality: Left;    CERVICAL DISC SURGERY  07/11/2016    DEBRIDEMENT Left 7/17/2020    Procedure: DEBRIDEMENT;  Surgeon: Verona Whitmore DPM;  Location: [facility] OR;  Service: Podiatry;  Laterality: Left;    DEBRIDEMENT OF FOOT Right     toes & plantar surface    ESOPHAGOGASTRODUODENOSCOPY N/A 12/16/2022    Procedure: EGD (ESOPHAGOGASTRODUODENOSCOPY);  Surgeon: Eren Francois MD;  Location: [facility] ENDO;  Service: Endoscopy;  Laterality: N/A;  Pt. on Dialysis. K+ ordered prior to procedure.EC    FRACTURE SURGERY Right     medial ankle, metal plate present    INSERTION OF TUNNELED CENTRAL VENOUS CATHETER (CVC) WITH SUBCUTANEOUS PORT N/A 6/11/2021    Procedure: TUNNEL CATH INSERTION WITHOUT PORT;  Surgeon: Jose Manuel Diagnostic Provider;  Location: [facility] OR;  Service: Radiology;  Laterality: N/A;  11AM START---PHONE PREOP 6/10/21---COVID POSTIVE ON 7/2020---NO S/S    left leg orthopedic surgery Left        Review of patient's allergies indicates:  No Known Allergies    No current facility-administered medications on file prior to encounter.     Current Outpatient Medications on File Prior to Encounter   Medication Sig    hydrALAZINE (APRESOLINE) 50 MG tablet Take 50 mg by mouth 3 (three) times daily.    NIFEdipine (PROCARDIA XL) 60 MG (OSM) 24 hr tablet Take 1 tablet (60 mg total) by mouth once daily.    chlorproMAZINE (THORAZINE) 25  MG tablet Take 1 tablet (25 mg total) by mouth 3 (three) times daily. for 14 days    doxycycline (VIBRA-TABS) 100 MG tablet Take 1 tablet (100 mg total) by mouth 2 (two) times daily.    doxycycline (VIBRAMYCIN) 100 MG Cap Take 1 capsule (100 mg total) by mouth every 12 (twelve) hours.    dulaglutide (TRULICITY) 0.75 mg/0.5 mL pen injector INJECT 0.5 ML UNDER THE SKIN EVERY 7 DAYS    esomeprazole (NEXIUM) 40 MG capsule Take 1 capsule (40 mg total) by mouth 2 (two) times daily before meals.    gabapentin (NEURONTIN) 100 MG capsule Take 1 capsule (100 mg total) by mouth 3 (three) times daily.    GI cocktail antac/dicyc/lidoc Swish and swallow 5 mLs 4 (four) times daily as needed (hiccups).    GI cocktail antac/dicyc/lidoc Swish and swallow 5 mLs 3 (three) times daily as needed (reflux).    heparin sod,pork in 0.45% NaCl (HEPARIN, PORCINE, 100 UNITS) 100 unit/100 mL (1 unit/mL) SolP in sodium chloride 0.45 % 100 mL infusion Heparin Sodium (Porcine) 1,000 Units/mL Systemic    methoxy peg-epoetin beta (MIRCERA INJ) 50 mcg.    metoclopramide HCl (REGLAN) 5 MG tablet Take 1 tablet (5 mg total) by mouth 2 (two) times daily as needed (hiccups).    sevelamer carbonate (RENVELA) 800 mg Tab Take 1 tablet (800 mg total) by mouth 3 (three) times daily with meals.    sodium chloride 0.9% SolP 100 mL with iron sucrose 100 mg iron/5 mL Soln 100 mg 50 mg.    torsemide (DEMADEX) 20 MG Tab Take 1 tablet (20 mg total) by mouth once daily. Take once a day on non-HD days     Family History       Problem Relation (Age of Onset)    Heart disease Father          Tobacco Use    Smoking status: Some Days     Types: Cigars    Smokeless tobacco: Never   Substance and Sexual Activity    Alcohol use: Yes     Comment: social    Drug use: No    Sexual activity: Not Currently     Review of Systems   Constitutional:  Negative for chills, fatigue and fever.   HENT:  Negative for congestion.    Eyes:  Negative for visual disturbance.    Respiratory:  Negative for cough and shortness of breath.    Cardiovascular:  Negative for chest pain.   Gastrointestinal:  Positive for nausea and vomiting. Negative for abdominal pain, constipation and diarrhea.   Endocrine: Negative for cold intolerance.   Genitourinary:  Negative for dysuria.   Musculoskeletal:  Negative for myalgias.   Skin:  Positive for wound.   Allergic/Immunologic: Negative for immunocompromised state.   Neurological:  Positive for dizziness and numbness. Negative for weakness.   Hematological:  Does not bruise/bleed easily.   Psychiatric/Behavioral:  Negative for confusion. The patient is not nervous/anxious.    Objective:     Vital Signs (Most Recent):  Temp: 98.1 °F (36.7 °C) (02/22/23 0231)  Pulse: 92 (02/22/23 0523)  Resp: 18 (02/22/23 0231)  BP: (!) 167/79 (02/22/23 0523)  SpO2: 100 % (02/22/23 0523)   Vital Signs (24h Range):  Temp:  [98.1 °F (36.7 °C)] 98.1 °F (36.7 °C)  Pulse:  [77-92] 92  Resp:  [18] 18  SpO2:  [95 %-100 %] 100 %  BP: (167-220)/() 167/79     Weight: 108.4 kg (239 lb)  Body mass index is 31.53 kg/m².    Physical Exam  Vitals and nursing note reviewed.   Constitutional:       Appearance: He is well-developed. He is not ill-appearing, toxic-appearing or diaphoretic.   HENT:      Head: Normocephalic and atraumatic.      Nose: Nose normal.      Mouth/Throat:      Pharynx: No oropharyngeal exudate.   Eyes:      General: No scleral icterus.        Right eye: No discharge.         Left eye: No discharge.      Extraocular Movements: EOM normal.      Conjunctiva/sclera: Conjunctivae normal.      Pupils: Pupils are equal, round, and reactive to light.   Neck:      Thyroid: No thyromegaly.      Vascular: No JVD.      Trachea: No tracheal deviation.   Cardiovascular:      Rate and Rhythm: Normal rate and regular rhythm.      Heart sounds: Normal heart sounds. No murmur heard.    No friction rub. No gallop.   Pulmonary:      Effort: Pulmonary effort is normal. No  respiratory distress.      Breath sounds: Normal breath sounds. No stridor. No decreased breath sounds, wheezing, rhonchi or rales.   Chest:      Chest wall: No swelling or tenderness.      Comments: Right IJ tunnel cath in place  Abdominal:      General: Bowel sounds are normal. There is no distension.      Palpations: Abdomen is soft. There is no mass.      Tenderness: There is no abdominal tenderness. There is no guarding or rebound.   Genitourinary:     Comments: No jenkins in place  Musculoskeletal:         General: No tenderness. Normal range of motion.      Cervical back: Normal range of motion and neck supple.   Feet:      Comments: Right foot in boot  Lymphadenopathy:      Cervical: No cervical adenopathy.      Comments: No peripheral edema   Skin:     General: Skin is warm and dry.      Coloration: Skin is not pale.      Findings: No erythema or rash.   Neurological:      Mental Status: He is alert and oriented to person, place, and time.      GCS: GCS eye subscore is 4. GCS verbal subscore is 5. GCS motor subscore is 6.      Cranial Nerves: No cranial nerve deficit.      Motor: No abnormal muscle tone.      Coordination: Coordination normal.      Comments:  strenght 5/5 bilateral   Psychiatric:         Attention and Perception: Attention and perception normal.         Behavior: Behavior normal.         Cognition and Memory: Cognition and memory normal.         CRANIAL NERVES     CN III, IV, VI   Pupils are equal, round, and reactive to light.  Extraocular motions are normal.      Significant Labs: All pertinent labs within the past 24 hours have been reviewed.  Recent Results (from the past 24 hour(s))   POCT glucose    Collection Time: 02/22/23  2:27 AM   Result Value Ref Range    POCT Glucose 117 (H) 70 - 110 mg/dL   CBC auto differential    Collection Time: 02/22/23  2:51 AM   Result Value Ref Range    WBC 5.53 3.90 - 12.70 K/uL    RBC 3.83 (L) 4.60 - 6.20 M/uL    Hemoglobin 11.1 (L) 14.0 - 18.0 g/dL     Hematocrit 32.5 (L) 40.0 - 54.0 %    MCV 85 82 - 98 fL    MCH 29.0 27.0 - 31.0 pg    MCHC 34.2 32.0 - 36.0 g/dL    RDW 13.2 11.5 - 14.5 %    Platelets 138 (L) 150 - 450 K/uL    MPV 9.5 9.2 - 12.9 fL    Immature Granulocytes 0.4 0.0 - 0.5 %    Gran # (ANC) 4.0 1.8 - 7.7 K/uL    Immature Grans (Abs) 0.02 0.00 - 0.04 K/uL    Lymph # 0.8 (L) 1.0 - 4.8 K/uL    Mono # 0.6 0.3 - 1.0 K/uL    Eos # 0.1 0.0 - 0.5 K/uL    Baso # 0.02 0.00 - 0.20 K/uL    nRBC 0 0 /100 WBC    Gran % 71.4 38.0 - 73.0 %    Lymph % 13.7 (L) 18.0 - 48.0 %    Mono % 11.6 4.0 - 15.0 %    Eosinophil % 2.5 0.0 - 8.0 %    Basophil % 0.4 0.0 - 1.9 %    Differential Method Automated    Comprehensive metabolic panel    Collection Time: 02/22/23  2:51 AM   Result Value Ref Range    Sodium 128 (L) 136 - 145 mmol/L    Potassium 3.6 3.5 - 5.1 mmol/L    Chloride 90 (L) 95 - 110 mmol/L    CO2 26 23 - 29 mmol/L    Glucose 122 (H) 70 - 110 mg/dL    BUN 19 6 - 20 mg/dL    Creatinine 6.3 (H) 0.5 - 1.4 mg/dL    Calcium 8.6 (L) 8.7 - 10.5 mg/dL    Total Protein 7.6 6.0 - 8.4 g/dL    Albumin 3.1 (L) 3.5 - 5.2 g/dL    Total Bilirubin 0.7 0.1 - 1.0 mg/dL    Alkaline Phosphatase 202 (H) 55 - 135 U/L    AST 24 10 - 40 U/L    ALT 17 10 - 44 U/L    Anion Gap 12 8 - 16 mmol/L    eGFR 10 (A) >60 mL/min/1.73 m^2         Significant Imaging: I have reviewed all pertinent imaging results/findings within the past 24 hours.    Assessment/Plan:     * TIA (transient ischemic attack)  Acute onset of vertigo with right sided paresthesias, now resolved  His BP has been elevated, so not sure if reactive due to CVA, or symptoms primary to severe HTN  MRI and MRA brain  ASA 325mg po x 1, then 81mg po qday  Lipitor 40mg po qday  Neuro checks q4, tele      Malignant renovascular hypertension  Continue home meds for now:       hydrALAZINE tablet 50 mg, 50 mg, Oral, TID     NIFEdipine 24 hr tablet 60 mg, 60 mg, Oral, Daily    Hyponatremia  Should improve with HD      ESRD on  hemodialysis  Consult Nephrology  [facility] T[facility] Th[facility]    He dialyzes via right IJ tunnel catheter (no AVF yet).      Anemia due to chronic kidney disease, on chronic dialysis  Stable to follow  Epo as per Nephro       Latest Reference Range & Units 02/23/22 07:58 11/21/22 12:07 02/22/23 02:51   Hemoglobin 14.0 - 18.0 g/dL 11.7 (L) 11.5 (L) 11.1 (L)        Type II diabetes mellitus with neurological manifestations  Patient's FSGs are controlled on current medication regimen.  Last A1c reviewed-   Lab Results   Component Value Date    HGBA1C 5.0 02/23/2022     Most recent fingerstick glucose reviewed-   Recent Labs   Lab 02/22/23 0227   POCTGLUCOSE 117*     Current correctional scale  Low  Maintain anti-hyperglycemic dose as follows-   Antihyperglycemics (From admission, onward)    Start     Stop Route Frequency Ordered    02/22/23 0634  insulin aspart U-100 pen 1-10 Units         -- SubQ Before meals & nightly PRN 02/22/23 0534    02/22/23 0634  insulin aspart U-100 pen 0-5 Units         -- SubQ Before meals & nightly PRN 02/22/23 0534        Hold Oral hypoglycemics while patient is in the hospital.    Hyperlipidemia, acquired  Started Lipitor 40mg po qday  FLP ordered      Foot ulcer, right, with fat layer exposed  He is due to have his dressings taken down from the boot today with Podiatry  Consult placed        VTE Risk Mitigation (From admission, onward)         Ordered     heparin (porcine) injection 5,000 Units  Every 8 hours         02/22/23 0510     Place MONY hose  Until discontinued         02/22/23 0510     IP VTE HIGH RISK PATIENT  Once         02/22/23 0510     Place sequential compression device  Until discontinued         02/22/23 0510                   LYNNE Quintanilla MD  Department of Hospital Medicine   [facility] [place] - Emergency Dept

## 2023-02-22 NOTE — DISCHARGE SUMMARY
"[facility] [place] Emergency Dept  Hospital Medicine  Discharge Summary      Patient Name: Catalino Mcfadden  MRN: [id number]  CASANDRA: [id number]  Patient Class: IP- Inpatient  Admission Date: 2/22/2023  Hospital Length of Stay: 1 days  Discharge Date and Time:  02/22/2023 11:58 AM  Attending Physician: No att. providers found   Discharging Provider: Laura Crowder MD  Primary Care Provider: Azikiwe K Lombard, MD (Inactive)    Primary Care Team: Networked reference to record PCT     HPI:   Mr. Mcfadden is a 56yo man with a past medical history of hiccoughs, ESRD on HD, DM2, foot ulcers, HTN, and obesity.  TTE on 7/28/21 showed normal EF and normal diastolic function.  He does HD on T, Th, Sat at [facility] (done yesterday).  He dialyzes via right IJ tunnel catheter (no AVF yet).    After HD yesterday, he was very fatigued so he drove to work and fell asleep.  He awoke at 2:30pm and found his right arm and leg to be, "numb like you're at the dentist or something."  He got up to work and found he had vertigo as well.  Despite this, he went ahead and did his work shift as a .    Later tonight he finally became concerned, despite the symptoms gradually resolving, and decided, "I might just need to go get this checked out."  He never had any headache, visual changes or focal weakness.  His paresthesias have completely resolved now except for a little numbness to his right fingers.    In the ED his VS were BP (!) 168/87 -> 211/103 -> 220/102 -> 179/85   Pulse 77   Temp 98.1 °F (36.7 °C)   Resp 18   Wt 108.4 kg (239 lb)   SpO2 100% RA  BMI 31.53 kg/m².  CT head showed no CT evidence of acute intracranial abnormality.    In the ED he was treated with:  Medications   NIFEdipine 24 hr tablet 60 mg (has no administration in time range)   hydrALAZINE tablet 75 mg (75 mg Oral Given 2/22/23 6974)   prochlorperazine injection Soln 10 mg (10 mg Intravenous Given 2/22/23 1010)             * No surgery found *      Hospital Course: " "  Mr. Mcfadden is a 56yo man with a past medical history of hiccoughs, ESRD on HD, DM2, foot ulcers, HTN, and obesity.  TTE on 7/28/21 showed normal EF and normal diastolic function.  He does HD on T, Th, Sat at [facility] (done yesterday).  He dialyzes via right IJ tunnel catheter (no AVF yet).     After HD yesterday, he was very fatigued so he drove to work and fell asleep.  He awoke at 2:30pm and found his right arm and leg to be, "numb like you're at the dentist or something."  He got up to work and found he had vertigo as well.  Despite this, he went ahead and did his work shift as a .     Later tonight he finally became concerned, despite the symptoms gradually resolving, and decided, "I might just need to go get this checked out."  He never had any headache, visual changes or focal weakness.  His paresthesias have completely resolved now except for a little numbness to his right fingers.     In the ED his VS were BP (!) 168/87 -> 211/103 -> 220/102 -> 179/85   Pulse 77   Temp 98.1 °F (36.7 °C)   Resp 18   Wt 108.4 kg (239 lb)   SpO2 100% RA  BMI 31.53 kg/m².  CT head showed no CT evidence of acute intracranial abnormality.  Head CT in ER show no acute process,his symptoms after controlling blood pressure quickly resolved,he say he was laying on his right arm  and after that numbness happen,on my exam no sign of focal neuro deficiency,all symptoms resolved,patient was discharged home with higher blood pressure medications and follow up with PCP as out patient.       Goals of Care Treatment Preferences:  Code Status: Full Code      Consults:     Neuro  * TIA (transient ischemic attack)  Acute onset of vertigo with right sided paresthesias, now resolved  His BP has been elevated, so not sure if reactive due to CVA, or symptoms primary to severe HTN  MRI and MRA brain  ASA 325mg po x 1, then 81mg po qday  Lipitor 40mg po qday  Neuro checks q4, tele      Cardiac/Vascular  Malignant renovascular " hypertension  Continue home meds for now:       hydrALAZINE tablet 50 mg, 50 mg, Oral, TID     NIFEdipine 24 hr tablet 60 mg, 60 mg, Oral, Daily    Hyperlipidemia, acquired  Started Lipitor 40mg po qday  FLP ordered      Renal/  ESRD on hemodialysis  Consult Nephrology  He does HD on T, Th, Sat at [facility] (done yesterday).    He dialyzes via right IJ tunnel catheter (no AVF yet).      Hyponatremia  Should improve with HD      Oncology  Anemia due to chronic kidney disease, on chronic dialysis  Stable to follow  Epo as per Nephro       Latest Reference Range & Units 02/23/22 07:58 11/21/22 12:07 02/22/23 02:51   Hemoglobin 14.0 - 18.0 g/dL 11.7 (L) 11.5 (L) 11.1 (L)        Endocrine  Type II diabetes mellitus with neurological manifestations  Patient's FSGs are controlled on current medication regimen.  Last A1c reviewed-   Lab Results   Component Value Date    HGBA1C 5.0 02/23/2022     Most recent fingerstick glucose reviewed-   Recent Labs   Lab 02/22/23  0227   POCTGLUCOSE 117*     Current correctional scale  Low  Maintain anti-hyperglycemic dose as follows-   Antihyperglycemics (From admission, onward)    Start     Stop Route Frequency Ordered    02/22/23 0634  insulin aspart U-100 pen 1-10 Units         -- SubQ Before meals & nightly PRN 02/22/23 0534    02/22/23 0634  insulin aspart U-100 pen 0-5 Units         -- SubQ Before meals & nightly PRN 02/22/23 0534        Hold Oral hypoglycemics while patient is in the hospital.    Orthopedic  Foot ulcer, right, with fat layer exposed  He is due to have his dressings taken down from the boot today with Podiatry  Consult placed        Final Active Diagnoses:    Diagnosis Date Noted POA    PRINCIPAL PROBLEM:  TIA (transient ischemic attack) [G45.9] 02/22/2023 Yes    ESRD on hemodialysis [N18.6, Z99.2] 02/22/2023 Not Applicable    Malignant renovascular hypertension [I15.0] 02/22/2023 Yes    Foot ulcer, right, with fat layer exposed [L97.512]  Yes    Type II  diabetes mellitus with neurological manifestations [E11.49]  Yes    Anemia due to chronic kidney disease, on chronic dialysis [N18.6, D63.1, Z99.2]  Not Applicable    Hyponatremia [E87.1] 07/16/2020 Yes    Hyperlipidemia, acquired [E78.5] 12/24/2014 Yes     Chronic      Problems Resolved During this Admission:       Discharged Condition: stable    Disposition: Home or Self Care    Follow Up:   Follow-up Information     Azikiwe K Lombard, MD Follow up.    Specialty: Family Medicine  Contact information:  [facility] [place] [address] [phone]                       Patient Instructions:   No discharge procedures on file.    Significant Diagnostic Studies: Labs:   BMP:   Recent Labs   Lab 02/22/23  0251 02/22/23 0622 02/22/23 0653   *  --  108   *  --  129*   K 3.6  --  3.5   CL 90*  --  92*   CO2 26  --  27   BUN 19  --  19   CREATININE 6.3*  --  6.6*   CALCIUM 8.6*  --  8.6*   MG  --  1.7  --    , CMP   Recent Labs   Lab 02/22/23  0251 02/22/23 0653   * 129*   K 3.6 3.5   CL 90* 92*   CO2 26 27   * 108   BUN 19 19   CREATININE 6.3* 6.6*   CALCIUM 8.6* 8.6*   PROT 7.6 6.8   ALBUMIN 3.1* 2.7*   BILITOT 0.7 0.6   ALKPHOS 202* 184*   AST 24 20   ALT 17 12   ANIONGAP 12 10    and CBC   Recent Labs   Lab 02/22/23  0251 02/22/23 0653   WBC 5.53 4.72   HGB 11.1* 10.3*   HCT 32.5* 31.1*   * 129*     Radiology: CT scan: head     Pending Diagnostic Studies:     Procedure Component Value Units Date/Time    Hemoglobin A1c if not done in past 3 months [[id number]] Collected: 02/22/23 0653    Order Status: Sent Lab Status: In process Updated: 02/22/23 0653    Specimen: Blood          Medications:  Reconciled Home Medications:      Medication List      START taking these medications    aspirin 81 MG Chew  Take 1 tablet (81 mg total) by mouth once daily.  Start taking on: February 23, 2023     atorvastatin 40 MG tablet  Commonly known as: LIPITOR  Take 1 tablet (40 mg total) by mouth  once daily.  Start taking on: February 23, 2023        CHANGE how you take these medications    hydrALAZINE 100 MG tablet  Commonly known as: APRESOLINE  Take 1 tablet (100 mg total) by mouth 3 (three) times daily.  What changed:   · medication strength  · how much to take        CONTINUE taking these medications    chlorproMAZINE 25 MG tablet  Commonly known as: THORAZINE  Take 1 tablet (25 mg total) by mouth 3 (three) times daily. for 14 days     * doxycycline 100 MG tablet  Commonly known as: VIBRA-TABS  Take 1 tablet (100 mg total) by mouth 2 (two) times daily.     * doxycycline 100 MG Cap  Commonly known as: VIBRAMYCIN  Take 1 capsule (100 mg total) by mouth every 12 (twelve) hours.     esomeprazole 40 MG capsule  Commonly known as: NEXIUM  Take 1 capsule (40 mg total) by mouth 2 (two) times daily before meals.     gabapentin 100 MG capsule  Commonly known as: NEURONTIN  Take 1 capsule (100 mg total) by mouth 3 (three) times daily.     * GI cocktail antac/dicyc/lidoc  Swish and swallow 5 mLs 4 (four) times daily as needed (hiccups).     * GI cocktail antac/dicyc/lidoc  Swish and swallow 5 mLs 3 (three) times daily as needed (reflux).     heparin (porcine) 100 units 100 unit/100 mL (1 unit/mL) Solp in sodium chloride 0.45 % 100 mL infusion  Heparin Sodium (Porcine) 1,000 Units/mL Systemic     metoclopramide HCl 5 MG tablet  Commonly known as: REGLAN  Take 1 tablet (5 mg total) by mouth 2 (two) times daily as needed (hiccups).     MIRCERA INJ  50 mcg.     NIFEdipine 60 MG (OSM) 24 hr tablet  Commonly known as: PROCARDIA XL  Take 1 tablet (60 mg total) by mouth once daily.     sevelamer carbonate 800 mg Tab  Commonly known as: RENVELA  Take 1 tablet (800 mg total) by mouth 3 (three) times daily with meals.     sodium chloride 0.9% SolP 100 mL with iron sucrose 100 mg iron/5 mL Soln 100 mg  50 mg.     torsemide 20 MG Tab  Commonly known as: DEMADEX  Take 1 tablet (20 mg total) by mouth once daily. Take once a day  on non-HD days     TRULICITY 0.75 mg/0.5 mL pen injector  Generic drug: dulaglutide  INJECT 0.5 ML UNDER THE SKIN EVERY 7 DAYS         * This list has 4 medication(s) that are the same as other medications prescribed for you. Read the directions carefully, and ask your doctor or other care provider to review them with you.                Indwelling Lines/Drains at time of discharge:   Lines/Drains/Airways     Central Venous Catheter Line  Duration                Hemodialysis Catheter 07/29/21 1608 right internal jugular 572 days                Time spent on the discharge of patient: less than 30  minutes         Laura Crowder MD  Department of Hospital Medicine  [facility] [place] - Emergency Dept

## 2023-02-22 NOTE — HPI
"Mr. Mcfadden is a 56yo man with a past medical history of hiccoughs, ESRD on HD, DM2, foot ulcers, HTN, and obesity.  TTE on 7/28/21 showed normal EF and normal diastolic function.  He does HD on T, Th, Sat at [facility] (done yesterday).  He dialyzes via right IJ tunnel catheter (no AVF yet).    After HD yesterday, he was very fatigued so he drove to work and fell asleep.  He awoke at 2:30pm and found his right arm and leg to be, "numb like you're at the dentist or something."  He got up to work and found he had vertigo as well.  Despite this, he went ahead and did his work shift as a .    Later tonight he finally became concerned, despite the symptoms gradually resolving, and decided, "I might just need to go get this checked out."  He never had any headache, visual changes or focal weakness.  His paresthesias have completely resolved now except for a little numbness to his right fingers.    In the ED his VS were BP (!) 168/87 -> 211/103 -> 220/102 -> 179/85   Pulse 77   Temp 98.1 °F (36.7 °C)   Resp 18   Wt 108.4 kg (239 lb)   SpO2 100% RA  BMI 31.53 kg/m².  CT head showed no CT evidence of acute intracranial abnormality.    In the ED he was treated with:  Medications   NIFEdipine 24 hr tablet 60 mg (has no administration in time range)   hydrALAZINE tablet 75 mg (75 mg Oral Given 2/22/23 1023)   prochlorperazine injection Soln 10 mg (10 mg Intravenous Given 2/22/23 3570)         "

## 2023-02-22 NOTE — SUBJECTIVE & OBJECTIVE
Past Medical History:   Diagnosis Date    Diabetes mellitus, type 2     Diabetic foot ulcer associated with diabetes mellitus due to underlying condition 07/16/2020    ESRD on hemodialysis     Hyperlipidemia     Hypertension     Obese        Past Surgical History:   Procedure Laterality Date    BONE BIOPSY Left 7/17/2020    Procedure: BIOPSY, BONE;  Surgeon: Verona Whitmore DPM;  Location: [facility] OR;  Service: Podiatry;  Laterality: Left;    CERVICAL DISC SURGERY  07/11/2016    DEBRIDEMENT Left 7/17/2020    Procedure: DEBRIDEMENT;  Surgeon: Verona Whitmore DPM;  Location: [facility] OR;  Service: Podiatry;  Laterality: Left;    DEBRIDEMENT OF FOOT Right     toes & plantar surface    ESOPHAGOGASTRODUODENOSCOPY N/A 12/16/2022    Procedure: EGD (ESOPHAGOGASTRODUODENOSCOPY);  Surgeon: Eren Francois MD;  Location: [facility] ENDO;  Service: Endoscopy;  Laterality: N/A;  Pt. on Dialysis. K+ ordered prior to procedure.EC    FRACTURE SURGERY Right     medial ankle, metal plate present    INSERTION OF TUNNELED CENTRAL VENOUS CATHETER (CVC) WITH SUBCUTANEOUS PORT N/A 6/11/2021    Procedure: TUNNEL CATH INSERTION WITHOUT PORT;  Surgeon: Jose Manuel Diagnostic Provider;  Location: [facility] OR;  Service: Radiology;  Laterality: N/A;  11AM START---PHONE PREOP 6/10/21---COVID POSTIVE ON 7/2020---NO S/S    left leg orthopedic surgery Left        Review of patient's allergies indicates:  No Known Allergies    No current facility-administered medications on file prior to encounter.     Current Outpatient Medications on File Prior to Encounter   Medication Sig    hydrALAZINE (APRESOLINE) 50 MG tablet Take 50 mg by mouth 3 (three) times daily.    NIFEdipine (PROCARDIA XL) 60 MG (OSM) 24 hr tablet Take 1 tablet (60 mg total) by mouth once daily.    chlorproMAZINE (THORAZINE) 25 MG tablet Take 1 tablet (25 mg total) by mouth 3 (three) times daily. for 14 days    doxycycline (VIBRA-TABS) 100 MG tablet Take 1 tablet (100 mg total) by mouth 2 (two) times  daily.    doxycycline (VIBRAMYCIN) 100 MG Cap Take 1 capsule (100 mg total) by mouth every 12 (twelve) hours.    dulaglutide (TRULICITY) 0.75 mg/0.5 mL pen injector INJECT 0.5 ML UNDER THE SKIN EVERY 7 DAYS    esomeprazole (NEXIUM) 40 MG capsule Take 1 capsule (40 mg total) by mouth 2 (two) times daily before meals.    gabapentin (NEURONTIN) 100 MG capsule Take 1 capsule (100 mg total) by mouth 3 (three) times daily.    GI cocktail antac/dicyc/lidoc Swish and swallow 5 mLs 4 (four) times daily as needed (hiccups).    GI cocktail antac/dicyc/lidoc Swish and swallow 5 mLs 3 (three) times daily as needed (reflux).    heparin sod,pork in 0.45% NaCl (HEPARIN, PORCINE, 100 UNITS) 100 unit/100 mL (1 unit/mL) SolP in sodium chloride 0.45 % 100 mL infusion Heparin Sodium (Porcine) 1,000 Units/mL Systemic    methoxy peg-epoetin beta (MIRCERA INJ) 50 mcg.    metoclopramide HCl (REGLAN) 5 MG tablet Take 1 tablet (5 mg total) by mouth 2 (two) times daily as needed (hiccups).    sevelamer carbonate (RENVELA) 800 mg Tab Take 1 tablet (800 mg total) by mouth 3 (three) times daily with meals.    sodium chloride 0.9% SolP 100 mL with iron sucrose 100 mg iron/5 mL Soln 100 mg 50 mg.    torsemide (DEMADEX) 20 MG Tab Take 1 tablet (20 mg total) by mouth once daily. Take once a day on non-HD days     Family History       Problem Relation (Age of Onset)    Heart disease Father          Tobacco Use    Smoking status: Some Days     Types: Cigars    Smokeless tobacco: Never   Substance and Sexual Activity    Alcohol use: Yes     Comment: social    Drug use: No    Sexual activity: Not Currently     Review of Systems   Constitutional:  Negative for chills, fatigue and fever.   HENT:  Negative for congestion.    Eyes:  Negative for visual disturbance.   Respiratory:  Negative for cough and shortness of breath.    Cardiovascular:  Negative for chest pain.   Gastrointestinal:  Positive for nausea and vomiting. Negative for abdominal pain,  constipation and diarrhea.   Endocrine: Negative for cold intolerance.   Genitourinary:  Negative for dysuria.   Musculoskeletal:  Negative for myalgias.   Skin:  Positive for wound.   Allergic/Immunologic: Negative for immunocompromised state.   Neurological:  Positive for dizziness and numbness. Negative for weakness.   Hematological:  Does not bruise/bleed easily.   Psychiatric/Behavioral:  Negative for confusion. The patient is not nervous/anxious.    Objective:     Vital Signs (Most Recent):  Temp: 98.1 °F (36.7 °C) (02/22/23 0231)  Pulse: 92 (02/22/23 0523)  Resp: 18 (02/22/23 0231)  BP: (!) 167/79 (02/22/23 0523)  SpO2: 100 % (02/22/23 0523)   Vital Signs (24h Range):  Temp:  [98.1 °F (36.7 °C)] 98.1 °F (36.7 °C)  Pulse:  [77-92] 92  Resp:  [18] 18  SpO2:  [95 %-100 %] 100 %  BP: (167-220)/() 167/79     Weight: 108.4 kg (239 lb)  Body mass index is 31.53 kg/m².    Physical Exam  Vitals and nursing note reviewed.   Constitutional:       Appearance: He is well-developed. He is not ill-appearing, toxic-appearing or diaphoretic.   HENT:      Head: Normocephalic and atraumatic.      Nose: Nose normal.      Mouth/Throat:      Pharynx: No oropharyngeal exudate.   Eyes:      General: No scleral icterus.        Right eye: No discharge.         Left eye: No discharge.      Extraocular Movements: EOM normal.      Conjunctiva/sclera: Conjunctivae normal.      Pupils: Pupils are equal, round, and reactive to light.   Neck:      Thyroid: No thyromegaly.      Vascular: No JVD.      Trachea: No tracheal deviation.   Cardiovascular:      Rate and Rhythm: Normal rate and regular rhythm.      Heart sounds: Normal heart sounds. No murmur heard.    No friction rub. No gallop.   Pulmonary:      Effort: Pulmonary effort is normal. No respiratory distress.      Breath sounds: Normal breath sounds. No stridor. No decreased breath sounds, wheezing, rhonchi or rales.   Chest:      Chest wall: No swelling or tenderness.       Comments: Right IJ tunnel cath in place  Abdominal:      General: Bowel sounds are normal. There is no distension.      Palpations: Abdomen is soft. There is no mass.      Tenderness: There is no abdominal tenderness. There is no guarding or rebound.   Genitourinary:     Comments: No jenkins in place  Musculoskeletal:         General: No tenderness. Normal range of motion.      Cervical back: Normal range of motion and neck supple.   Feet:      Comments: Right foot in boot  Lymphadenopathy:      Cervical: No cervical adenopathy.      Comments: No peripheral edema   Skin:     General: Skin is warm and dry.      Coloration: Skin is not pale.      Findings: No erythema or rash.   Neurological:      Mental Status: He is alert and oriented to person, place, and time.      GCS: GCS eye subscore is 4. GCS verbal subscore is 5. GCS motor subscore is 6.      Cranial Nerves: No cranial nerve deficit.      Motor: No abnormal muscle tone.      Coordination: Coordination normal.      Comments:  strenght 5/5 bilateral   Psychiatric:         Attention and Perception: Attention and perception normal.         Behavior: Behavior normal.         Cognition and Memory: Cognition and memory normal.         CRANIAL NERVES     CN III, IV, VI   Pupils are equal, round, and reactive to light.  Extraocular motions are normal.      Significant Labs: All pertinent labs within the past 24 hours have been reviewed.  Recent Results (from the past 24 hour(s))   POCT glucose    Collection Time: 02/22/23  2:27 AM   Result Value Ref Range    POCT Glucose 117 (H) 70 - 110 mg/dL   CBC auto differential    Collection Time: 02/22/23  2:51 AM   Result Value Ref Range    WBC 5.53 3.90 - 12.70 K/uL    RBC 3.83 (L) 4.60 - 6.20 M/uL    Hemoglobin 11.1 (L) 14.0 - 18.0 g/dL    Hematocrit 32.5 (L) 40.0 - 54.0 %    MCV 85 82 - 98 fL    MCH 29.0 27.0 - 31.0 pg    MCHC 34.2 32.0 - 36.0 g/dL    RDW 13.2 11.5 - 14.5 %    Platelets 138 (L) 150 - 450 K/uL    MPV 9.5  9.2 - 12.9 fL    Immature Granulocytes 0.4 0.0 - 0.5 %    Gran # (ANC) 4.0 1.8 - 7.7 K/uL    Immature Grans (Abs) 0.02 0.00 - 0.04 K/uL    Lymph # 0.8 (L) 1.0 - 4.8 K/uL    Mono # 0.6 0.3 - 1.0 K/uL    Eos # 0.1 0.0 - 0.5 K/uL    Baso # 0.02 0.00 - 0.20 K/uL    nRBC 0 0 /100 WBC    Gran % 71.4 38.0 - 73.0 %    Lymph % 13.7 (L) 18.0 - 48.0 %    Mono % 11.6 4.0 - 15.0 %    Eosinophil % 2.5 0.0 - 8.0 %    Basophil % 0.4 0.0 - 1.9 %    Differential Method Automated    Comprehensive metabolic panel    Collection Time: 02/22/23  2:51 AM   Result Value Ref Range    Sodium 128 (L) 136 - 145 mmol/L    Potassium 3.6 3.5 - 5.1 mmol/L    Chloride 90 (L) 95 - 110 mmol/L    CO2 26 23 - 29 mmol/L    Glucose 122 (H) 70 - 110 mg/dL    BUN 19 6 - 20 mg/dL    Creatinine 6.3 (H) 0.5 - 1.4 mg/dL    Calcium 8.6 (L) 8.7 - 10.5 mg/dL    Total Protein 7.6 6.0 - 8.4 g/dL    Albumin 3.1 (L) 3.5 - 5.2 g/dL    Total Bilirubin 0.7 0.1 - 1.0 mg/dL    Alkaline Phosphatase 202 (H) 55 - 135 U/L    AST 24 10 - 40 U/L    ALT 17 10 - 44 U/L    Anion Gap 12 8 - 16 mmol/L    eGFR 10 (A) >60 mL/min/1.73 m^2         Significant Imaging: I have reviewed all pertinent imaging results/findings within the past 24 hours.

## 2023-02-22 NOTE — ASSESSMENT & PLAN NOTE
Stable to follow  Epo as per Nephro       Latest Reference Range & Units 02/23/22 07:58 11/21/22 12:07 02/22/23 02:51   Hemoglobin 14.0 - 18.0 g/dL 11.7 (L) 11.5 (L) 11.1 (L)

## 2023-02-22 NOTE — ASSESSMENT & PLAN NOTE
Patient's FSGs are controlled on current medication regimen.  Last A1c reviewed-   Lab Results   Component Value Date    HGBA1C 5.0 02/23/2022     Most recent fingerstick glucose reviewed-   Recent Labs   Lab 02/22/23 0227   POCTGLUCOSE 117*     Current correctional scale  Low  Maintain anti-hyperglycemic dose as follows-   Antihyperglycemics (From admission, onward)    Start     Stop Route Frequency Ordered    02/22/23 0634  insulin aspart U-100 pen 1-10 Units         -- SubQ Before meals & nightly PRN 02/22/23 0534    02/22/23 0634  insulin aspart U-100 pen 0-5 Units         -- SubQ Before meals & nightly PRN 02/22/23 0534        Hold Oral hypoglycemics while patient is in the hospital.

## 2023-02-26 NOTE — PROGRESS NOTES
Subjective:      Patient ID: Catalino Mcfadden is a 57 y.o. male.    Chief Complaint: Diabetes Mellitus (6/17/22 Dr Lombard PCP) and Wound Check    Catalino is a 57 y.o. male who presents to the clinic for evaluation and treatment of high risk feet. Ctaalino has a past medical history of Diabetes mellitus, type 2, Diabetic foot ulcer associated with diabetes mellitus due to underlying condition (07/16/2020), ESRD on hemodialysis, Hyperlipidemia, Hypertension, and Obese. Reports new onset left heel blister x several days after a sock rubbed on the left heel. Seen in ED on 11/7/22.     11/16/2022 returns to clinic for follow up of left heel wound.  Seen by my colleague last week, cultures obtained (NGTD) and patient placed on PO abx.  He has kept dressing intact.     11/23/22: F/u left heel ulceration. Presents with calamine coflex left foot.     11/30/22: F/u left heel ulceration. Patient reports going to work this week.     12/14/22: F/u left heel ulceration. Presents in calamine coflex wrap.     12/21/22: F/u left heel ulceration. Presents in calamine coflex wrap.     12/27/22: F/u left heel ulceration. Presents in calamine coflex wrap. No new pedal complaints, continues to work     01/04/23: F/u left heel ulceration. Presents in calamine coflex wrap. No new pedal complaints, continues to work.  Continues to have hiccups      01/11/23: F/u left heel ulceration. Presents in calamine coflex wrap which appears to have padding which shifted medially. No new pedal complaints, continues to work.  Continues to have hiccups    01/18/23: F/u left heel ulceration. Presents in intact calamine coflex wrap. No new pedal complaints.                                                                                                                                                                                                                                                                                                                                                                                                                                                                                                                                                                                                                                                                                                                                                                                                                                                                                                                                                                                                                                                                                                                                                                                                                                                                                                                                                                                                                                                                                                                                                                                                                                                                                                                                                                                                                                                                                                                                                                                                                                                                                                                                                                                                                                                                                                                    01/25/23 F/u left heel ulceration.  Presents in intact calamine coflex wrap to the LLE. Patients relates that on Monday he removed bandage to the right foot and the skin peeled off.    2/1/2023 F/u right ulceration. Presents in intact calamine coflex wrap to the RLE.  No new pedal complaints      PCP: Azikiwe K Lombard, MD (Inactive)    Date Last Seen by PCP:   Chief Complaint   Patient presents with    Diabetes Mellitus     6/17/22 Dr Lombard PCP    Wound Check           Hemoglobin A1C   Date Value Ref Range Status   02/22/2023 5.1 4.0 - 5.6 % Final     Comment:     ADA Screening Guidelines:  5.7-6.4%  Consistent with prediabetes  >or=6.5%  Consistent with diabetes    High levels of fetal hemoglobin interfere with the HbA1C  assay. Heterozygous hemoglobin variants (HbS, HgC, etc)do  not significantly interfere with this assay.   However, presence of multiple variants may affect accuracy.     02/23/2022 5.0 4.0 - 5.6 % Final     Comment:     ADA Screening Guidelines:  5.7-6.4%  Consistent with prediabetes  >or=6.5%  Consistent with diabetes    High levels of fetal hemoglobin interfere with the HbA1C  assay. Heterozygous hemoglobin variants (HbS, HgC, etc)do  not significantly interfere with this assay.   However, presence of multiple variants may affect accuracy.     02/09/2022 5.0 4.0 - 5.6 % Final     Comment:     ADA Screening Guidelines:  5.7-6.4%  Consistent with prediabetes  >or=6.5%  Consistent with diabetes    High levels of fetal hemoglobin interfere with the HbA1C  assay. Heterozygous hemoglobin variants (HbS, HgC, etc)do  not significantly interfere with this assay.   However, presence of multiple variants may affect accuracy.         Review of Systems   Constitutional: Negative for chills.   Cardiovascular:  Negative for chest pain and claudication.   Respiratory:  Negative for cough.    Skin:  Positive for color change, dry skin and nail changes.   Musculoskeletal:  Positive for joint pain.   Gastrointestinal:  Negative for nausea.  "  Neurological:  Positive for paresthesias. Negative for numbness.   Psychiatric/Behavioral:  The patient is not nervous/anxious.          Objective:      Vitals:    02/01/23 0706   Weight: 112.5 kg (248 lb 0.3 oz)   Height: 6' 1" (1.854 m)   PainSc: 0-No pain       Physical Exam  Vitals and nursing note reviewed.   Constitutional:       General: He is not in acute distress.     Appearance: He is not toxic-appearing or diaphoretic.      Comments: Pt. is well-developed, well-nourished, appears stated age, in no acute distress, alert and oriented x 3. No evidence of depression, anxiety, or agitation. Calm, cooperative, and communicative. Appropriate interactions and affect.   Cardiovascular:      Pulses:           Dorsalis pedis pulses are 2+ on the right side and 2+ on the left side.        Posterior tibial pulses are 1+ on the right side and 1+ on the left side.      Comments: There is decreased digital hair. Skin is atrophic, slightly hyperpigmented  Pulmonary:      Effort: No respiratory distress.   Musculoskeletal:      Right lower leg: Edema present.      Left lower leg: Edema present.      Right ankle: Swelling present. No tenderness. No lateral malleolus, medial malleolus, AITF ligament, CF ligament or posterior TF ligament tenderness.      Right Achilles Tendon: No defects. Hilliard's test negative.      Left ankle: Swelling present. No tenderness. No lateral malleolus, medial malleolus, AITF ligament, CF ligament or posterior TF ligament tenderness.      Left Achilles Tendon: No defects. Hilliard's test negative.      Right foot: No tenderness or bony tenderness.      Left foot: No tenderness or bony tenderness.      Comments: Decreased stride, station of gait.  apropulsive toe off.  Increased angle and base of gait.    There is equinus deformity bilateral with decreased dorsiflexion at the ankle joint bilateral. No tenderness with compression of heel. Negative tinels sign. Gait analysis reveals excessive " pronation through midstance and propulsion with early heel off.     Patient has hammertoes of digits 2-5 bilateral partially reducible     Decreased first MPJ range of motion both weightbearing and nonweightbearing, no crepitus observed the first MP joint, + dorsal flag sign.     Visible and palpable bunion without pain at dorsomedial 1st metatarsal head right and left.  Hallux abducted right and left partially reducible, tracks laterally without being track bound.  No ecchymosis, erythema, edema, or cardinal signs infection or signs of trauma same foot.    Fat pad atrophy to heels and met heads bilateral    Plantarflexed 1st ray RLE   Lymphadenopathy:      Comments: No lymphatic streaking    Negative lymphadenopathy bilateral popliteal fossa and tarsal tunnel.     Skin:     General: Skin is warm and dry.      Coloration: Skin is not pale.      Findings: Abrasion (left lateral midfoot) and lesion (see wound descriptions below) present. No ecchymosis, laceration or rash.      Nails: There is no clubbing.      Comments: Toenails 1-5 bilaterally discolored/yellowed, dystrophic, brittle with subungual debris.   Superficial abrasion left dorsal lateral foot.    Neurological:      Sensory: Sensory deficit present.      Comments:  [place] 5.07 monofilamant testing is diminished Kp feet. Decreased/absent vibratory sensation bilateral feet to 128Hz tuning fork.     Paresthesias, and hyperesthesia bilateral feet with no clearly identified trigger or source.           Psychiatric:         Attention and Perception: He is attentive.         Mood and Affect: Mood is not anxious. Affect is not inappropriate.         Speech: He is communicative. Speech is not slurred.         Behavior: Behavior is not combative.     2/1/2023 01/25/23          Right medial 1st MTPJ            01/18/23:               01/11/23:        01/04/23:        12/27/22:         12/21/22:          11/30/22 11/16/2022    Some areas  of deeper pressure noted but otherwise stable            11/9/22:  S/p drainage left heel bullae formation serous drainage noted.             Assessment:       Encounter Diagnoses   Name Primary?    Type 2 diabetes mellitus with stage 5 chronic kidney disease Yes    Type II diabetes mellitus with neurological manifestations     Foot ulcer, right, with fat layer exposed     Healed ulcer of left foot on examination                        Plan:       Catalino was seen today for diabetes mellitus and wound check.    Diagnoses and all orders for this visit:    Type 2 diabetes mellitus with stage 5 chronic kidney disease    Type II diabetes mellitus with neurological manifestations    Foot ulcer, right, with fat layer exposed    Healed ulcer of left foot on examination                I counseled the patient on his conditions, their implications and medical management.    Education about the prevention of limb loss.    Discussed wound healing cycle, skin integrity, ways to care for skin.Counseled patient on the effects of biomechanical pressure on healing. He verbalizes understanding that it can increase the chances of delayed healing and this prolonged exposure leads to infection or progression of infection which subsequently can result in loss of limb.    Adequate vitamin supplementation, protein intake, and hydration - discussed with patient    The wound is cleansed of foreign material as much as possible and the base inspected for bone or abscess.      Left foot wound has remained healed    Right foot wound has improved     Dressings: collagen, calamine coflex compression wrap applied to RLE.  Well tolerated  Offloading: football and darco shoe    Follow-up: 1 week but should call [facility] immediately if any signs of infection, such as fever, chills, sweats, increased redness or pain.    Short-term goals include maintaining good offloading and minimizing bioburden, promoting granulation and epithelialization to healing.   Long-term goals include keeping the wound healed by good offloading and medical management under the direction of internist.    Shoe inspection. Diabetic Foot Education. Patient reminded of the importance of good nutrition and blood sugar control to help prevent podiatric complications of diabetes. Patient instructed on proper foot hygeine. We discussed wearing proper shoe gear, daily foot inspections, never walking without protective shoe gear, never putting sharp instruments to feet.

## 2023-03-01 ENCOUNTER — OFFICE VISIT (OUTPATIENT)
Dept: PODIATRY | Facility: CLINIC | Age: 58
End: 2023-03-01
Payer: COMMERCIAL

## 2023-03-01 VITALS — HEIGHT: 73 IN | WEIGHT: 239 LBS | BODY MASS INDEX: 31.68 KG/M2

## 2023-03-01 DIAGNOSIS — L97.412 DIABETIC ULCER OF RIGHT MIDFOOT ASSOCIATED WITH TYPE 2 DIABETES MELLITUS, WITH FAT LAYER EXPOSED: Primary | ICD-10-CM

## 2023-03-01 DIAGNOSIS — E11.22 TYPE 2 DIABETES MELLITUS WITH STAGE 5 CHRONIC KIDNEY DISEASE: ICD-10-CM

## 2023-03-01 DIAGNOSIS — E11.49 TYPE II DIABETES MELLITUS WITH NEUROLOGICAL MANIFESTATIONS: ICD-10-CM

## 2023-03-01 DIAGNOSIS — N18.5 TYPE 2 DIABETES MELLITUS WITH STAGE 5 CHRONIC KIDNEY DISEASE: ICD-10-CM

## 2023-03-01 DIAGNOSIS — E11.621 DIABETIC ULCER OF RIGHT MIDFOOT ASSOCIATED WITH TYPE 2 DIABETES MELLITUS, WITH FAT LAYER EXPOSED: Primary | ICD-10-CM

## 2023-03-01 DIAGNOSIS — Z87.2 HEALED ULCER OF LEFT FOOT ON EXAMINATION: ICD-10-CM

## 2023-03-01 PROCEDURE — 1159F PR MEDICATION LIST DOCUMENTED IN MEDICAL RECORD: ICD-10-PCS | Mod: CPTII,S$GLB,, | Performed by: PODIATRIST

## 2023-03-01 PROCEDURE — 99213 PR OFFICE/OUTPT VISIT, EST, LEVL III, 20-29 MIN: ICD-10-PCS | Mod: S$GLB,,, | Performed by: PODIATRIST

## 2023-03-01 PROCEDURE — 1111F PR DISCHARGE MEDS RECONCILED W/ CURRENT OUTPATIENT MED LIST: ICD-10-PCS | Mod: CPTII,S$GLB,, | Performed by: PODIATRIST

## 2023-03-01 PROCEDURE — 3008F BODY MASS INDEX DOCD: CPT | Mod: CPTII,S$GLB,, | Performed by: PODIATRIST

## 2023-03-01 PROCEDURE — 99999 PR PBB SHADOW E&M-EST. PATIENT-LVL IV: ICD-10-PCS | Mod: PBBFAC,,, | Performed by: PODIATRIST

## 2023-03-01 PROCEDURE — 3044F HG A1C LEVEL LT 7.0%: CPT | Mod: CPTII,S$GLB,, | Performed by: PODIATRIST

## 2023-03-01 PROCEDURE — 1160F RVW MEDS BY RX/DR IN RCRD: CPT | Mod: CPTII,S$GLB,, | Performed by: PODIATRIST

## 2023-03-01 PROCEDURE — 3008F PR BODY MASS INDEX (BMI) DOCUMENTED: ICD-10-PCS | Mod: CPTII,S$GLB,, | Performed by: PODIATRIST

## 2023-03-01 PROCEDURE — 1159F MED LIST DOCD IN RCRD: CPT | Mod: CPTII,S$GLB,, | Performed by: PODIATRIST

## 2023-03-01 PROCEDURE — 1160F PR REVIEW ALL MEDS BY PRESCRIBER/CLIN PHARMACIST DOCUMENTED: ICD-10-PCS | Mod: CPTII,S$GLB,, | Performed by: PODIATRIST

## 2023-03-01 PROCEDURE — 99213 OFFICE O/P EST LOW 20 MIN: CPT | Mod: S$GLB,,, | Performed by: PODIATRIST

## 2023-03-01 PROCEDURE — 99999 PR PBB SHADOW E&M-EST. PATIENT-LVL IV: CPT | Mod: PBBFAC,,, | Performed by: PODIATRIST

## 2023-03-01 PROCEDURE — 1111F DSCHRG MED/CURRENT MED MERGE: CPT | Mod: CPTII,S$GLB,, | Performed by: PODIATRIST

## 2023-03-01 PROCEDURE — 3044F PR MOST RECENT HEMOGLOBIN A1C LEVEL <7.0%: ICD-10-PCS | Mod: CPTII,S$GLB,, | Performed by: PODIATRIST

## 2023-03-01 NOTE — PROGRESS NOTES
Subjective:      Patient ID: Catalino Mcfadden is a 57 y.o. male.    Chief Complaint: Diabetes Mellitus and Wound Check    Catalino is a 57 y.o. male who presents to the clinic for evaluation and treatment of high risk feet. Catalino has a past medical history of Diabetes mellitus, type 2, Diabetic foot ulcer associated with diabetes mellitus due to underlying condition (07/16/2020), ESRD on hemodialysis, Hyperlipidemia, Hypertension, and Obese. Reports new onset left heel blister x several days after a sock rubbed on the left heel. Seen in ED on 11/7/22.     11/16/2022 returns to clinic for follow up of left heel wound.  Seen by my colleague last week, cultures obtained (NGTD) and patient placed on PO abx.  He has kept dressing intact.     11/23/22: F/u left heel ulceration. Presents with calamine coflex left foot.     11/30/22: F/u left heel ulceration. Patient reports going to work this week.     12/14/22: F/u left heel ulceration. Presents in calamine coflex wrap.     12/21/22: F/u left heel ulceration. Presents in calamine coflex wrap.     12/27/22: F/u left heel ulceration. Presents in calamine coflex wrap. No new pedal complaints, continues to work     01/04/23: F/u left heel ulceration. Presents in calamine coflex wrap. No new pedal complaints, continues to work.  Continues to have hiccups      01/11/23: F/u left heel ulceration. Presents in calamine coflex wrap which appears to have padding which shifted medially. No new pedal complaints, continues to work.  Continues to have hiccups    01/18/23: F/u left heel ulceration. Presents in intact calamine coflex wrap. No new pedal complaints.    2/8/23: F/u right foot ulceration. Presents in calamine coflex wrap.     03/01/23: F/u right foot ulceration. Presents in calamine coflex wrap. Missed last encounter due to being in observation in the  ED    PCP: AZIKIWE K. LOMBARD, MD    Date Last Seen by PCP:   Chief Complaint   Patient presents with    Diabetes Mellitus    Wound  "Check           Hemoglobin A1C   Date Value Ref Range Status   02/22/2023 5.1 4.0 - 5.6 % Final     Comment:     ADA Screening Guidelines:  5.7-6.4%  Consistent with prediabetes  >or=6.5%  Consistent with diabetes    High levels of fetal hemoglobin interfere with the HbA1C  assay. Heterozygous hemoglobin variants (HbS, HgC, etc)do  not significantly interfere with this assay.   However, presence of multiple variants may affect accuracy.     02/23/2022 5.0 4.0 - 5.6 % Final     Comment:     ADA Screening Guidelines:  5.7-6.4%  Consistent with prediabetes  >or=6.5%  Consistent with diabetes    High levels of fetal hemoglobin interfere with the HbA1C  assay. Heterozygous hemoglobin variants (HbS, HgC, etc)do  not significantly interfere with this assay.   However, presence of multiple variants may affect accuracy.     02/09/2022 5.0 4.0 - 5.6 % Final     Comment:     ADA Screening Guidelines:  5.7-6.4%  Consistent with prediabetes  >or=6.5%  Consistent with diabetes    High levels of fetal hemoglobin interfere with the HbA1C  assay. Heterozygous hemoglobin variants (HbS, HgC, etc)do  not significantly interfere with this assay.   However, presence of multiple variants may affect accuracy.         Review of Systems   Constitutional: Negative for chills.   Cardiovascular:  Negative for chest pain and claudication.   Respiratory:  Negative for cough.    Skin:  Positive for color change, dry skin and nail changes.   Musculoskeletal:  Positive for joint pain.   Gastrointestinal:  Negative for nausea.   Neurological:  Positive for paresthesias. Negative for numbness.   Psychiatric/Behavioral:  The patient is not nervous/anxious.          Objective:      Vitals:    03/01/23 0708   Weight: 108.4 kg (238 lb 15.7 oz)   Height: 6' 1" (1.854 m)       Physical Exam  Vitals and nursing note reviewed.   Constitutional:       General: He is not in acute distress.     Appearance: He is not toxic-appearing or diaphoretic.      Comments: " Pt. is well-developed, well-nourished, appears stated age, in no acute distress, alert and oriented x 3. No evidence of depression, anxiety, or agitation. Calm, cooperative, and communicative. Appropriate interactions and affect.   Cardiovascular:      Pulses:           Dorsalis pedis pulses are 2+ on the right side and 2+ on the left side.        Posterior tibial pulses are 1+ on the right side and 1+ on the left side.      Comments: There is decreased digital hair. Skin is atrophic, slightly hyperpigmented  Pulmonary:      Effort: No respiratory distress.   Musculoskeletal:      Right lower leg: Edema present.      Left lower leg: Edema present.      Right ankle: Swelling present. No tenderness. No lateral malleolus, medial malleolus, AITF ligament, CF ligament or posterior TF ligament tenderness.      Right Achilles Tendon: No defects. Hilliard's test negative.      Left ankle: Swelling present. No tenderness. No lateral malleolus, medial malleolus, AITF ligament, CF ligament or posterior TF ligament tenderness.      Left Achilles Tendon: No defects. Hilliard's test negative.      Right foot: No tenderness or bony tenderness.      Left foot: No tenderness or bony tenderness.      Comments: Decreased stride, station of gait.  apropulsive toe off.  Increased angle and base of gait.    There is equinus deformity bilateral with decreased dorsiflexion at the ankle joint bilateral. No tenderness with compression of heel. Negative tinels sign. Gait analysis reveals excessive pronation through midstance and propulsion with early heel off.     Patient has hammertoes of digits 2-5 bilateral partially reducible     Decreased first MPJ range of motion both weightbearing and nonweightbearing, no crepitus observed the first MP joint, + dorsal flag sign.     Visible and palpable bunion without pain at dorsomedial 1st metatarsal head right and left.  Hallux abducted right and left partially reducible, tracks laterally without  being track bound.  No ecchymosis, erythema, edema, or cardinal signs infection or signs of trauma same foot.    Fat pad atrophy to heels and met heads bilateral    Plantarflexed 1st ray RLE   Lymphadenopathy:      Comments: No lymphatic streaking    Negative lymphadenopathy bilateral popliteal fossa and tarsal tunnel.     Skin:     General: Skin is warm and dry.      Coloration: Skin is not pale.      Findings: Abrasion (left lateral midfoot) and lesion (see wound descriptions below) present. No ecchymosis, laceration or rash.      Nails: There is no clubbing.      Comments: Toenails 1-5 bilaterally discolored/yellowed, dystrophic, brittle with subungual debris.   Superficial abrasion left dorsal lateral foot.    Focal hyperkeratotic lesion consisting entirely of hyperkeratotic tissue without open skin, drainage, pus, fluctuance, malodor, or signs of infection:  left plantar foot.       Neurological:      Sensory: Sensory deficit present.      Comments:  [place] 5.07 monofilamant testing is diminished Kp feet. Decreased/absent vibratory sensation bilateral feet to 128Hz tuning fork.     Paresthesias, and hyperesthesia bilateral feet with no clearly identified trigger or source.           Psychiatric:         Attention and Perception: He is attentive.         Mood and Affect: Mood is not anxious. Affect is not inappropriate.         Speech: He is communicative. Speech is not slurred.         Behavior: Behavior is not combative.     3/1/23                2/8/23:  [place]: 0.5ccmx0.5cmx0.1cm red granular base right medial foot ulceration.             01/18/23:               01/11/23:        01/04/23:        12/27/22:         12/21/22:          11/30/22 11/16/2022    Some areas of deeper pressure noted but otherwise stable            11/9/22:  S/p drainage left heel bullae formation serous drainage noted.             Assessment:       Encounter Diagnoses   Name Primary?    Diabetic ulcer of right  midfoot associated with type 2 diabetes mellitus, with fat layer exposed Yes    Type II diabetes mellitus with neurological manifestations     Type 2 diabetes mellitus with stage 5 chronic kidney disease     Healed ulcer of left foot on examination                        Plan:       Catalino was seen today for diabetes mellitus and wound check.    Diagnoses and all orders for this visit:    Diabetic ulcer of right midfoot associated with type 2 diabetes mellitus, with fat layer exposed    Type II diabetes mellitus with neurological manifestations    Type 2 diabetes mellitus with stage 5 chronic kidney disease    Healed ulcer of left foot on examination                I counseled the patient on his conditions, their implications and medical management.    Education about the prevention of limb loss.    Discussed wound healing cycle, skin integrity, ways to care for skin.Counseled patient on the effects of biomechanical pressure on healing. He verbalizes understanding that it can increase the chances of delayed healing and this prolonged exposure leads to infection or progression of infection which subsequently can result in loss of limb.    Adequate vitamin supplementation, protein intake, and hydration - discussed with patient    The wound is cleansed of foreign material as much as possible and the base inspected for bone or abscess.      Wound has virtually healed but skin is thin in area of pressure - right    Dressings: collagen, calamine coflex compression wrap applied to RLE   Well tolerated      Follow-up: 1 week but should call [facility] immediately if any signs of infection, such as fever, chills, sweats, increased redness or pain.    Short-term goals include maintaining good offloading and minimizing bioburden, promoting granulation and epithelialization to healing.  Long-term goals include keeping the wound healed by good offloading and medical management under the direction of internist.    Shoe inspection.  Diabetic Foot Education. Patient reminded of the importance of good nutrition and blood sugar control to help prevent podiatric complications of diabetes. Patient instructed on proper foot hygeine. We discussed wearing proper shoe gear, daily foot inspections, never walking without protective shoe gear, never putting sharp instruments to feet.

## 2023-03-07 NOTE — PROGRESS NOTES
Subjective:      Patient ID: Catalino Mcfadden is a 57 y.o. male.    Chief Complaint: Diabetes Mellitus and Wound Check    Catalino is a 57 y.o. male who presents to the clinic for evaluation and treatment of high risk feet. Catalino has a past medical history of Diabetes mellitus, type 2, Diabetic foot ulcer associated with diabetes mellitus due to underlying condition (07/16/2020), ESRD on hemodialysis, Hyperlipidemia, Hypertension, and Obese. Reports new onset left heel blister x several days after a sock rubbed on the left heel. Seen in ED on 11/7/22.     11/16/2022 returns to clinic for follow up of left heel wound.  Seen by my colleague last week, cultures obtained (NGTD) and patient placed on PO abx.  He has kept dressing intact.     11/23/22: F/u left heel ulceration. Presents with calamine coflex left foot.     11/30/22: F/u left heel ulceration. Patient reports going to work this week.     12/14/22: F/u left heel ulceration. Presents in calamine coflex wrap.     12/21/22: F/u left heel ulceration. Presents in calamine coflex wrap.     12/27/22: F/u left heel ulceration. Presents in calamine coflex wrap. No new pedal complaints, continues to work     01/04/23: F/u left heel ulceration. Presents in calamine coflex wrap. No new pedal complaints, continues to work.  Continues to have hiccups      01/11/23: F/u left heel ulceration. Presents in calamine coflex wrap which appears to have padding which shifted medially. No new pedal complaints, continues to work.  Continues to have hiccups    01/18/23: F/u left heel ulceration. Presents in intact calamine coflex wrap. No new pedal complaints.    2/8/23: F/u right foot ulceration. Presents in calamine coflex wrap.       02/15/23: F/u right ulceration. Presents in intact calamine coflex wrap. No new pedal complaints.    PCP: AZIKIWE K. LOMBARD, MD    Date Last Seen by PCP:   Chief Complaint   Patient presents with    Diabetes Mellitus    Wound Check           Hemoglobin A1C  "  Date Value Ref Range Status   02/22/2023 5.1 4.0 - 5.6 % Final     Comment:     ADA Screening Guidelines:  5.7-6.4%  Consistent with prediabetes  >or=6.5%  Consistent with diabetes    High levels of fetal hemoglobin interfere with the HbA1C  assay. Heterozygous hemoglobin variants (HbS, HgC, etc)do  not significantly interfere with this assay.   However, presence of multiple variants may affect accuracy.     02/23/2022 5.0 4.0 - 5.6 % Final     Comment:     ADA Screening Guidelines:  5.7-6.4%  Consistent with prediabetes  >or=6.5%  Consistent with diabetes    High levels of fetal hemoglobin interfere with the HbA1C  assay. Heterozygous hemoglobin variants (HbS, HgC, etc)do  not significantly interfere with this assay.   However, presence of multiple variants may affect accuracy.     02/09/2022 5.0 4.0 - 5.6 % Final     Comment:     ADA Screening Guidelines:  5.7-6.4%  Consistent with prediabetes  >or=6.5%  Consistent with diabetes    High levels of fetal hemoglobin interfere with the HbA1C  assay. Heterozygous hemoglobin variants (HbS, HgC, etc)do  not significantly interfere with this assay.   However, presence of multiple variants may affect accuracy.         Review of Systems   Constitutional: Negative for chills.   Cardiovascular:  Negative for chest pain and claudication.   Respiratory:  Negative for cough.    Skin:  Positive for color change, dry skin and nail changes.   Musculoskeletal:  Positive for joint pain.   Gastrointestinal:  Negative for nausea.   Neurological:  Positive for paresthesias. Negative for numbness.   Psychiatric/Behavioral:  The patient is not nervous/anxious.          Objective:      Vitals:    02/15/23 0653   Weight: 112.5 kg (248 lb 0.3 oz)   Height: 6' 1" (1.854 m)   PainSc: 0-No pain       Physical Exam  Vitals and nursing note reviewed.   Constitutional:       General: He is not in acute distress.     Appearance: He is not toxic-appearing or diaphoretic.      Comments: Pt. is " well-developed, well-nourished, appears stated age, in no acute distress, alert and oriented x 3. No evidence of depression, anxiety, or agitation. Calm, cooperative, and communicative. Appropriate interactions and affect.   Cardiovascular:      Pulses:           Dorsalis pedis pulses are 2+ on the right side and 2+ on the left side.        Posterior tibial pulses are 1+ on the right side and 1+ on the left side.      Comments: There is decreased digital hair. Skin is atrophic, slightly hyperpigmented  Pulmonary:      Effort: No respiratory distress.   Musculoskeletal:      Right lower leg: Edema present.      Left lower leg: Edema present.      Right ankle: Swelling present. No tenderness. No lateral malleolus, medial malleolus, AITF ligament, CF ligament or posterior TF ligament tenderness.      Right Achilles Tendon: No defects. Hilliard's test negative.      Left ankle: Swelling present. No tenderness. No lateral malleolus, medial malleolus, AITF ligament, CF ligament or posterior TF ligament tenderness.      Left Achilles Tendon: No defects. Hilliard's test negative.      Right foot: No tenderness or bony tenderness.      Left foot: No tenderness or bony tenderness.      Comments: Decreased stride, station of gait.  apropulsive toe off.  Increased angle and base of gait.    There is equinus deformity bilateral with decreased dorsiflexion at the ankle joint bilateral. No tenderness with compression of heel. Negative tinels sign. Gait analysis reveals excessive pronation through midstance and propulsion with early heel off.     Patient has hammertoes of digits 2-5 bilateral partially reducible     Decreased first MPJ range of motion both weightbearing and nonweightbearing, no crepitus observed the first MP joint, + dorsal flag sign.     Visible and palpable bunion without pain at dorsomedial 1st metatarsal head right and left.  Hallux abducted right and left partially reducible, tracks laterally without being  track bound.  No ecchymosis, erythema, edema, or cardinal signs infection or signs of trauma same foot.    Fat pad atrophy to heels and met heads bilateral    Plantarflexed 1st ray RLE   Lymphadenopathy:      Comments: No lymphatic streaking    Negative lymphadenopathy bilateral popliteal fossa and tarsal tunnel.     Skin:     General: Skin is warm and dry.      Coloration: Skin is not pale.      Findings: Lesion (see wound descriptions below) present. No ecchymosis, laceration or rash.      Nails: There is no clubbing.      Comments: Toenails 1-5 bilaterally discolored/yellowed, dystrophic, brittle with subungual debris.   Superficial abrasion left dorsal lateral foot.    Focal hyperkeratotic lesion consisting entirely of hyperkeratotic tissue without open skin, drainage, pus, fluctuance, malodor, or signs of infection:  left plantar foot.       Neurological:      Sensory: Sensory deficit present.      Comments:  [place] 5.07 monofilamant testing is diminished Kp feet. Decreased/absent vibratory sensation bilateral feet to 128Hz tuning fork.     Paresthesias, and hyperesthesia bilateral feet with no clearly identified trigger or source.           Psychiatric:         Attention and Perception: He is attentive.         Mood and Affect: Mood is not anxious. Affect is not inappropriate.         Speech: He is communicative. Speech is not slurred.         Behavior: Behavior is not combative.       2/15/23                    2/8/23:  Post hudson: 0.5ccmx0.5cmx0.1cm red granular base right medial foot ulceration.             01/18/23:               01/11/23:        01/04/23:        12/27/22:         12/21/22:          11/30/22 11/16/2022    Some areas of deeper pressure noted but otherwise stable            11/9/22:  S/p drainage left heel bullae formation serous drainage noted.             Assessment:       Encounter Diagnoses   Name Primary?    Diabetic ulcer of right midfoot associated with type 2 diabetes  mellitus, with fat layer exposed Yes    Type II diabetes mellitus with neurological manifestations                        Plan:       Catalino was seen today for diabetes mellitus and wound check.    Diagnoses and all orders for this visit:    Diabetic ulcer of right midfoot associated with type 2 diabetes mellitus, with fat layer exposed    Type II diabetes mellitus with neurological manifestations        I counseled the patient on his conditions, their implications and medical management.    Education about the prevention of limb loss.    Discussed wound healing cycle, skin integrity, ways to care for skin.Counseled patient on the effects of biomechanical pressure on healing. He verbalizes understanding that it can increase the chances of delayed healing and this prolonged exposure leads to infection or progression of infection which subsequently can result in loss of limb.    Adequate vitamin supplementation, protein intake, and hydration - discussed with patient    The wound is cleansed of foreign material as much as possible and the base inspected for bone or abscess.      Wound has virtually healed but skin is thin to the wound of the right foot     Dressings: collagen, calamine coflex compression wrap applied to RLE as a security football  Well tolerated  Offloading: football and darco shoe    Follow-up: 1 week but should call [facility] immediately if any signs of infection, such as fever, chills, sweats, increased redness or pain.    Short-term goals include maintaining good offloading and minimizing bioburden, promoting granulation and epithelialization to healing.  Long-term goals include keeping the wound healed by good offloading and medical management under the direction of internist.    Shoe inspection. Diabetic Foot Education. Patient reminded of the importance of good nutrition and blood sugar control to help prevent podiatric complications of diabetes. Patient instructed on proper foot hygeine. We  discussed wearing proper shoe gear, daily foot inspections, never walking without protective shoe gear, never putting sharp instruments to feet.

## 2023-03-13 ENCOUNTER — OFFICE VISIT (OUTPATIENT)
Dept: GASTROENTEROLOGY | Facility: CLINIC | Age: 58
End: 2023-03-13
Payer: COMMERCIAL

## 2023-03-13 VITALS — HEART RATE: 78 BPM | BODY MASS INDEX: 33.21 KG/M2 | HEIGHT: 73 IN | WEIGHT: 250.56 LBS

## 2023-03-13 DIAGNOSIS — R06.6 HICCUPS: Primary | ICD-10-CM

## 2023-03-13 PROCEDURE — 1160F RVW MEDS BY RX/DR IN RCRD: CPT | Mod: CPTII,S$GLB,, | Performed by: INTERNAL MEDICINE

## 2023-03-13 PROCEDURE — 1159F PR MEDICATION LIST DOCUMENTED IN MEDICAL RECORD: ICD-10-PCS | Mod: CPTII,S$GLB,, | Performed by: INTERNAL MEDICINE

## 2023-03-13 PROCEDURE — 1111F PR DISCHARGE MEDS RECONCILED W/ CURRENT OUTPATIENT MED LIST: ICD-10-PCS | Mod: CPTII,S$GLB,, | Performed by: INTERNAL MEDICINE

## 2023-03-13 PROCEDURE — 99999 PR PBB SHADOW E&M-EST. PATIENT-LVL III: CPT | Mod: PBBFAC,,, | Performed by: INTERNAL MEDICINE

## 2023-03-13 PROCEDURE — 1111F DSCHRG MED/CURRENT MED MERGE: CPT | Mod: CPTII,S$GLB,, | Performed by: INTERNAL MEDICINE

## 2023-03-13 PROCEDURE — 3044F PR MOST RECENT HEMOGLOBIN A1C LEVEL <7.0%: ICD-10-PCS | Mod: CPTII,S$GLB,, | Performed by: INTERNAL MEDICINE

## 2023-03-13 PROCEDURE — 1159F MED LIST DOCD IN RCRD: CPT | Mod: CPTII,S$GLB,, | Performed by: INTERNAL MEDICINE

## 2023-03-13 PROCEDURE — 3008F PR BODY MASS INDEX (BMI) DOCUMENTED: ICD-10-PCS | Mod: CPTII,S$GLB,, | Performed by: INTERNAL MEDICINE

## 2023-03-13 PROCEDURE — 99999 PR PBB SHADOW E&M-EST. PATIENT-LVL III: ICD-10-PCS | Mod: PBBFAC,,, | Performed by: INTERNAL MEDICINE

## 2023-03-13 PROCEDURE — 3008F BODY MASS INDEX DOCD: CPT | Mod: CPTII,S$GLB,, | Performed by: INTERNAL MEDICINE

## 2023-03-13 PROCEDURE — 3044F HG A1C LEVEL LT 7.0%: CPT | Mod: CPTII,S$GLB,, | Performed by: INTERNAL MEDICINE

## 2023-03-13 PROCEDURE — 1160F PR REVIEW ALL MEDS BY PRESCRIBER/CLIN PHARMACIST DOCUMENTED: ICD-10-PCS | Mod: CPTII,S$GLB,, | Performed by: INTERNAL MEDICINE

## 2023-03-13 PROCEDURE — 99214 OFFICE O/P EST MOD 30 MIN: CPT | Mod: S$GLB,,, | Performed by: INTERNAL MEDICINE

## 2023-03-13 PROCEDURE — 99214 PR OFFICE/OUTPT VISIT, EST, LEVL IV, 30-39 MIN: ICD-10-PCS | Mod: S$GLB,,, | Performed by: INTERNAL MEDICINE

## 2023-03-13 RX ORDER — PANTOPRAZOLE SODIUM 40 MG/1
1 TABLET, DELAYED RELEASE ORAL DAILY
COMMUNITY
Start: 2022-10-24 | End: 2023-12-08 | Stop reason: SDUPTHER

## 2023-03-13 RX ORDER — SUCROFERRIC OXYHYDROXIDE 500 MG/1
1 TABLET, CHEWABLE ORAL DAILY
Status: ON HOLD | COMMUNITY
Start: 2022-09-28 | End: 2023-12-18 | Stop reason: HOSPADM

## 2023-03-13 RX ORDER — IMIPRAMINE HYDROCHLORIDE 25 MG/1
25 TABLET, FILM COATED ORAL NIGHTLY
Qty: 30 TABLET | Refills: 11 | Status: ON HOLD | OUTPATIENT
Start: 2023-03-13 | End: 2023-07-06 | Stop reason: CLARIF

## 2023-03-13 NOTE — PROGRESS NOTES
"[facility]    CC: hiccups    HPI 57 y.o. male who presents with chronic, constant, intractable hiccups for the past 8 months with associated belching and vomiting.    He has tried and failed baclofen, gabapentin, reglan and thorazine.  He is taking Nexium twice per day.    EGD 12/16/22 showed food in the stomach but was otherwise unrevealing.    He has a globus sensation as well.  He also has a decreased appetite.    Past Medical History  Past Medical History:   Diagnosis Date    Diabetes mellitus, type 2     Diabetic foot ulcer associated with diabetes mellitus due to underlying condition 07/16/2020    ESRD on hemodialysis     Hyperlipidemia     Hypertension     Obese          Review of Systems  General ROS: negative for chills, fever or weight loss  Cardiovascular ROS: no chest pain or dyspnea on exertion  Gastrointestinal ROS: no abdominal pain, change in bowel habits, or black/ bloody stools    Physical Examination  Pulse 78   Ht 6' 1" (1.854 m)   Wt 113.6 kg (250 lb 8.8 oz)   BMI 33.06 kg/m²   General appearance: alert, cooperative, appears uncomfortable, belches/hiccups throughout interview  HENT: Normocephalic, atraumatic, neck symmetrical, no nasal discharge   Lungs: clear to auscultation bilaterally, no dullness to percussion bilaterally  Heart: regular rate and rhythm without rub; no displacement of the PMI   Abdomen: soft, non-tender; bowel sounds normoactive; no organomegaly  Extremities: extremities symmetric; no clubbing, cyanosis, or edema  Neurologic: Alert and oriented X 3, normal strength, normal coordination and gait    Labs:  Lab Results   Component Value Date    WBC 4.72 02/22/2023    HGB 10.3 (L) 02/22/2023    HCT 31.1 (L) 02/22/2023    MCV 84 02/22/2023     (L) 02/22/2023         CMP  Sodium   Date Value Ref Range Status   02/22/2023 129 (L) 136 - 145 mmol/L Final     Potassium   Date Value Ref Range Status   02/22/2023 3.5 3.5 - 5.1 mmol/L Final     Chloride "   Date Value Ref Range Status   02/22/2023 92 (L) 95 - 110 mmol/L Final     CO2   Date Value Ref Range Status   02/22/2023 27 23 - 29 mmol/L Final     Glucose   Date Value Ref Range Status   02/22/2023 108 70 - 110 mg/dL Final     BUN   Date Value Ref Range Status   02/22/2023 19 6 - 20 mg/dL Final     Creatinine   Date Value Ref Range Status   02/22/2023 6.6 (H) 0.5 - 1.4 mg/dL Final     Calcium   Date Value Ref Range Status   02/22/2023 8.6 (L) 8.7 - 10.5 mg/dL Final     Total Protein   Date Value Ref Range Status   02/22/2023 6.8 6.0 - 8.4 g/dL Final     Albumin   Date Value Ref Range Status   02/22/2023 2.7 (L) 3.5 - 5.2 g/dL Final     Total Bilirubin   Date Value Ref Range Status   02/22/2023 0.6 0.1 - 1.0 mg/dL Final     Comment:     For infants and newborns, interpretation of results should be based  on gestational age, weight and in agreement with clinical  observations.    Premature Infant recommended reference ranges:  Up to 24 hours.............<8.0 mg/dL  Up to 48 hours............<12.0 mg/dL  3-5 days..................<15.0 mg/dL  6-29 days.................<15.0 mg/dL       Alkaline Phosphatase   Date Value Ref Range Status   02/22/2023 184 (H) 55 - 135 U/L Final     AST   Date Value Ref Range Status   02/22/2023 20 10 - 40 U/L Final     ALT   Date Value Ref Range Status   02/22/2023 12 10 - 44 U/L Final     Anion Gap   Date Value Ref Range Status   02/22/2023 10 8 - 16 mmol/L Final     eGFR   Date Value Ref Range Status   02/22/2023 9 (A) >60 mL/min/1.73 m^2 Final       Assessment:   The patient is a 58 yo man with ESRD on HD who presents with severe, intractable hiccups with associated belching, vomiting, globus sensation and a decreased appetite.    Plan:  -Start Imipramine at bedtime (patient works nights so this will be in the AM).  -Schedule CT chest without contrast.  -Schedule gastric emptying study ordered by Dr. Francois.  -Schedule esophageal manometry with belching and rumination  protocol.      Roopa Pérez MD

## 2023-03-15 ENCOUNTER — OFFICE VISIT (OUTPATIENT)
Dept: PODIATRY | Facility: CLINIC | Age: 58
End: 2023-03-15
Payer: COMMERCIAL

## 2023-03-15 VITALS — BODY MASS INDEX: 33.19 KG/M2 | HEIGHT: 73 IN | WEIGHT: 250.44 LBS

## 2023-03-15 DIAGNOSIS — N18.5 TYPE 2 DIABETES MELLITUS WITH STAGE 5 CHRONIC KIDNEY DISEASE: Primary | ICD-10-CM

## 2023-03-15 DIAGNOSIS — E11.22 TYPE 2 DIABETES MELLITUS WITH STAGE 5 CHRONIC KIDNEY DISEASE: Primary | ICD-10-CM

## 2023-03-15 PROCEDURE — 1159F MED LIST DOCD IN RCRD: CPT | Mod: CPTII,S$GLB,, | Performed by: PODIATRIST

## 2023-03-15 PROCEDURE — 1111F PR DISCHARGE MEDS RECONCILED W/ CURRENT OUTPATIENT MED LIST: ICD-10-PCS | Mod: CPTII,S$GLB,, | Performed by: PODIATRIST

## 2023-03-15 PROCEDURE — 3044F HG A1C LEVEL LT 7.0%: CPT | Mod: CPTII,S$GLB,, | Performed by: PODIATRIST

## 2023-03-15 PROCEDURE — 99999 PR PBB SHADOW E&M-EST. PATIENT-LVL IV: ICD-10-PCS | Mod: PBBFAC,,, | Performed by: PODIATRIST

## 2023-03-15 PROCEDURE — 99214 OFFICE O/P EST MOD 30 MIN: CPT | Mod: S$GLB,,, | Performed by: PODIATRIST

## 2023-03-15 PROCEDURE — 1111F DSCHRG MED/CURRENT MED MERGE: CPT | Mod: CPTII,S$GLB,, | Performed by: PODIATRIST

## 2023-03-15 PROCEDURE — 3008F BODY MASS INDEX DOCD: CPT | Mod: CPTII,S$GLB,, | Performed by: PODIATRIST

## 2023-03-15 PROCEDURE — 3044F PR MOST RECENT HEMOGLOBIN A1C LEVEL <7.0%: ICD-10-PCS | Mod: CPTII,S$GLB,, | Performed by: PODIATRIST

## 2023-03-15 PROCEDURE — 1159F PR MEDICATION LIST DOCUMENTED IN MEDICAL RECORD: ICD-10-PCS | Mod: CPTII,S$GLB,, | Performed by: PODIATRIST

## 2023-03-15 PROCEDURE — 99999 PR PBB SHADOW E&M-EST. PATIENT-LVL IV: CPT | Mod: PBBFAC,,, | Performed by: PODIATRIST

## 2023-03-15 PROCEDURE — 99214 PR OFFICE/OUTPT VISIT, EST, LEVL IV, 30-39 MIN: ICD-10-PCS | Mod: S$GLB,,, | Performed by: PODIATRIST

## 2023-03-15 PROCEDURE — 3008F PR BODY MASS INDEX (BMI) DOCUMENTED: ICD-10-PCS | Mod: CPTII,S$GLB,, | Performed by: PODIATRIST

## 2023-03-15 NOTE — PROGRESS NOTES
Subjective:      Patient ID: Catailno Mcfadden is a 57 y.o. male.    Chief Complaint: Diabetes Mellitus, Wound Check, and Follow-up    Catalino is a 57 y.o. male who presents to the clinic for evaluation and treatment of high risk feet. Catalino has a past medical history of Diabetes mellitus, type 2, Diabetic foot ulcer associated with diabetes mellitus due to underlying condition (07/16/2020), ESRD on hemodialysis, Hyperlipidemia, Hypertension, and Obese. Reports new onset left heel blister x several days after a sock rubbed on the left heel. Seen in ED on 11/7/22.     11/16/2022 returns to clinic for follow up of left heel wound.  Seen by my colleague last week, cultures obtained (NGTD) and patient placed on PO abx.  He has kept dressing intact.     11/23/22: F/u left heel ulceration. Presents with calamine coflex left foot.     11/30/22: F/u left heel ulceration. Patient reports going to work this week.     12/14/22: F/u left heel ulceration. Presents in calamine coflex wrap.     12/21/22: F/u left heel ulceration. Presents in calamine coflex wrap.     12/27/22: F/u left heel ulceration. Presents in calamine coflex wrap. No new pedal complaints, continues to work     01/04/23: F/u left heel ulceration. Presents in calamine coflex wrap. No new pedal complaints, continues to work.  Continues to have hiccups      01/11/23: F/u left heel ulceration. Presents in calamine coflex wrap which appears to have padding which shifted medially. No new pedal complaints, continues to work.  Continues to have hiccups    01/18/23: F/u left heel ulceration. Presents in intact calamine coflex wrap. No new pedal complaints.    2/8/23: F/u right foot ulceration. Presents in calamine coflex wrap.     02/15/23: F/u right ulceration. Presents in intact calamine coflex wrap. No new pedal complaints.    3/15/23: F/u right foot ulceration. Presents in calamine coflex wrap.     PCP: AZIKIWE K. LOMBARD, MD    Date Last Seen by PCP:   Chief Complaint  "  Patient presents with    Diabetes Mellitus    Wound Check    Follow-up           Hemoglobin A1C   Date Value Ref Range Status   02/22/2023 5.1 4.0 - 5.6 % Final     Comment:     ADA Screening Guidelines:  5.7-6.4%  Consistent with prediabetes  >or=6.5%  Consistent with diabetes    High levels of fetal hemoglobin interfere with the HbA1C  assay. Heterozygous hemoglobin variants (HbS, HgC, etc)do  not significantly interfere with this assay.   However, presence of multiple variants may affect accuracy.     02/23/2022 5.0 4.0 - 5.6 % Final     Comment:     ADA Screening Guidelines:  5.7-6.4%  Consistent with prediabetes  >or=6.5%  Consistent with diabetes    High levels of fetal hemoglobin interfere with the HbA1C  assay. Heterozygous hemoglobin variants (HbS, HgC, etc)do  not significantly interfere with this assay.   However, presence of multiple variants may affect accuracy.     02/09/2022 5.0 4.0 - 5.6 % Final     Comment:     ADA Screening Guidelines:  5.7-6.4%  Consistent with prediabetes  >or=6.5%  Consistent with diabetes    High levels of fetal hemoglobin interfere with the HbA1C  assay. Heterozygous hemoglobin variants (HbS, HgC, etc)do  not significantly interfere with this assay.   However, presence of multiple variants may affect accuracy.         Review of Systems   Constitutional: Negative for chills.   Cardiovascular:  Negative for chest pain and claudication.   Respiratory:  Negative for cough.    Skin:  Positive for color change, dry skin and nail changes.   Musculoskeletal:  Positive for joint pain.   Gastrointestinal:  Negative for nausea.   Neurological:  Positive for paresthesias. Negative for numbness.   Psychiatric/Behavioral:  The patient is not nervous/anxious.          Objective:      Vitals:    03/15/23 0823   Weight: 113.6 kg (250 lb 7.1 oz)   Height: 6' 1" (1.854 m)   PainSc: 0-No pain       Physical Exam  Vitals and nursing note reviewed.   Constitutional:       General: He is not in " acute distress.     Appearance: He is not toxic-appearing or diaphoretic.      Comments: Pt. is well-developed, well-nourished, appears stated age, in no acute distress, alert and oriented x 3. No evidence of depression, anxiety, or agitation. Calm, cooperative, and communicative. Appropriate interactions and affect.   Cardiovascular:      Pulses:           Dorsalis pedis pulses are 2+ on the right side and 2+ on the left side.        Posterior tibial pulses are 1+ on the right side and 1+ on the left side.      Comments: There is decreased digital hair. Skin is atrophic, slightly hyperpigmented  Pulmonary:      Effort: No respiratory distress.   Musculoskeletal:      Right lower leg: Edema present.      Left lower leg: Edema present.      Right ankle: Swelling present. No tenderness. No lateral malleolus, medial malleolus, AITF ligament, CF ligament or posterior TF ligament tenderness.      Right Achilles Tendon: No defects. Hilliard's test negative.      Left ankle: Swelling present. No tenderness. No lateral malleolus, medial malleolus, AITF ligament, CF ligament or posterior TF ligament tenderness.      Left Achilles Tendon: No defects. Hilliard's test negative.      Right foot: No tenderness or bony tenderness.      Left foot: No tenderness or bony tenderness.      Comments: Decreased stride, station of gait.  apropulsive toe off.  Increased angle and base of gait.    There is equinus deformity bilateral with decreased dorsiflexion at the ankle joint bilateral. No tenderness with compression of heel. Negative tinels sign. Gait analysis reveals excessive pronation through midstance and propulsion with early heel off.     Patient has hammertoes of digits 2-5 bilateral partially reducible     Decreased first MPJ range of motion both weightbearing and nonweightbearing, no crepitus observed the first MP joint, + dorsal flag sign.     Visible and palpable bunion without pain at dorsomedial 1st metatarsal head right  and left.  Hallux abducted right and left partially reducible, tracks laterally without being track bound.  No ecchymosis, erythema, edema, or cardinal signs infection or signs of trauma same foot.    Fat pad atrophy to heels and met heads bilateral    Plantarflexed 1st ray RLE   Lymphadenopathy:      Comments: No lymphatic streaking    Negative lymphadenopathy bilateral popliteal fossa and tarsal tunnel.     Skin:     General: Skin is warm and dry.      Coloration: Skin is not pale.      Findings: Lesion (see wound descriptions below) present. No ecchymosis, laceration or rash.      Nails: There is no clubbing.      Comments: Toenails 1-5 bilaterally discolored/yellowed, dystrophic, brittle with subungual debris.   Superficial abrasion left dorsal lateral foot.    Focal hyperkeratotic lesion consisting entirely of hyperkeratotic tissue without open skin, drainage, pus, fluctuance, malodor, or signs of infection:  left plantar foot.       Neurological:      Sensory: Sensory deficit present.      Comments:  [place] 5.07 monofilamant testing is diminished Kp feet. Decreased/absent vibratory sensation bilateral feet to 128Hz tuning fork.     Paresthesias, and hyperesthesia bilateral feet with no clearly identified trigger or source.           Psychiatric:         Attention and Perception: He is attentive.         Mood and Affect: Mood is not anxious. Affect is not inappropriate.         Speech: He is communicative. Speech is not slurred.         Behavior: Behavior is not combative.     3/15/23:        2/15/23                    2/8/23:  Post [place]: 0.5ccmx0.5cmx0.1cm red granular base right medial foot ulceration.             01/18/23:               01/11/23:        01/04/23:        12/27/22:         12/21/22:          11/30/22 11/16/2022    Some areas of deeper pressure noted but otherwise stable            11/9/22:  S/p drainage left heel bullae formation serous drainage noted.             Assessment:        No diagnosis found.                      Plan:       There are no diagnoses linked to this encounter.      I counseled the patient on his conditions, their implications and medical management.    Education about the prevention of limb loss.    Discussed wound healing cycle, skin integrity, ways to care for skin.Counseled patient on the effects of biomechanical pressure on healing. He verbalizes understanding that it can increase the chances of delayed healing and this prolonged exposure leads to infection or progression of infection which subsequently can result in loss of limb.    Adequate vitamin supplementation, protein intake, and hydration - discussed with patient    The wound is cleansed of foreign material as much as possible and the base inspected for bone or abscess.      Wound has virtually healed but skin is thin to the wound of the right foot     Dressings: Betadine, hydrofera blue     calamine coflex compression wrap applied to RLE as a security football  Well tolerated  Offloading: football and darco shoe    With the patient's verbal consent a sterile #15 scalpel was used to trim the hyperkeratotic lesion described above.     Follow-up: 1 week but should call [facility] immediately if any signs of infection, such as fever, chills, sweats, increased redness or pain.    Short-term goals include maintaining good offloading and minimizing bioburden, promoting granulation and epithelialization to healing.  Long-term goals include keeping the wound healed by good offloading and medical management under the direction of internist.    Shoe inspection. Diabetic Foot Education. Patient reminded of the importance of good nutrition and blood sugar control to help prevent podiatric complications of diabetes. Patient instructed on proper foot hygeine. We discussed wearing proper shoe gear, daily foot inspections, never walking without protective shoe gear, never putting sharp instruments to feet.

## 2023-03-21 ENCOUNTER — TELEPHONE (OUTPATIENT)
Dept: ENDOSCOPY | Facility: HOSPITAL | Age: 58
End: 2023-03-21
Payer: COMMERCIAL

## 2023-03-22 ENCOUNTER — TELEPHONE (OUTPATIENT)
Dept: GASTROENTEROLOGY | Facility: CLINIC | Age: 58
End: 2023-03-22
Payer: COMMERCIAL

## 2023-03-22 ENCOUNTER — HOSPITAL ENCOUNTER (OUTPATIENT)
Dept: RADIOLOGY | Facility: HOSPITAL | Age: 58
Discharge: HOME | End: 2023-03-22
Attending: INTERNAL MEDICINE
Payer: COMMERCIAL

## 2023-03-22 ENCOUNTER — OFFICE VISIT (OUTPATIENT)
Dept: PODIATRY | Facility: CLINIC | Age: 58
End: 2023-03-22
Payer: COMMERCIAL

## 2023-03-22 VITALS — HEIGHT: 73 IN | WEIGHT: 250 LBS | BODY MASS INDEX: 33.13 KG/M2

## 2023-03-22 DIAGNOSIS — R06.6 HICCUPS: ICD-10-CM

## 2023-03-22 DIAGNOSIS — Z87.2 HEALED ULCER OF LEFT FOOT ON EXAMINATION: ICD-10-CM

## 2023-03-22 DIAGNOSIS — Z87.2 HEALED ULCER OF RIGHT FOOT ON EXAMINATION: ICD-10-CM

## 2023-03-22 DIAGNOSIS — N18.5 TYPE 2 DIABETES MELLITUS WITH STAGE 5 CHRONIC KIDNEY DISEASE: Primary | ICD-10-CM

## 2023-03-22 DIAGNOSIS — R91.8 ABNORMAL FINDINGS ON DIAGNOSTIC IMAGING OF LUNG: ICD-10-CM

## 2023-03-22 DIAGNOSIS — R93.3 ABNORMAL FINDING ON GI TRACT IMAGING: Primary | ICD-10-CM

## 2023-03-22 DIAGNOSIS — E11.22 TYPE 2 DIABETES MELLITUS WITH STAGE 5 CHRONIC KIDNEY DISEASE: Primary | ICD-10-CM

## 2023-03-22 PROCEDURE — 71250 CT THORAX DX C-: CPT | Mod: TC

## 2023-03-22 PROCEDURE — 1111F DSCHRG MED/CURRENT MED MERGE: CPT | Mod: CPTII,S$GLB,, | Performed by: PODIATRIST

## 2023-03-22 PROCEDURE — 99999 PR PBB SHADOW E&M-EST. PATIENT-LVL IV: ICD-10-PCS | Mod: PBBFAC,,, | Performed by: PODIATRIST

## 2023-03-22 PROCEDURE — 99999 PR PBB SHADOW E&M-EST. PATIENT-LVL IV: CPT | Mod: PBBFAC,,, | Performed by: PODIATRIST

## 2023-03-22 PROCEDURE — 1160F RVW MEDS BY RX/DR IN RCRD: CPT | Mod: CPTII,S$GLB,, | Performed by: PODIATRIST

## 2023-03-22 PROCEDURE — 3044F PR MOST RECENT HEMOGLOBIN A1C LEVEL <7.0%: ICD-10-PCS | Mod: CPTII,S$GLB,, | Performed by: PODIATRIST

## 2023-03-22 PROCEDURE — 3008F BODY MASS INDEX DOCD: CPT | Mod: CPTII,S$GLB,, | Performed by: PODIATRIST

## 2023-03-22 PROCEDURE — 1111F PR DISCHARGE MEDS RECONCILED W/ CURRENT OUTPATIENT MED LIST: ICD-10-PCS | Mod: CPTII,S$GLB,, | Performed by: PODIATRIST

## 2023-03-22 PROCEDURE — 3044F HG A1C LEVEL LT 7.0%: CPT | Mod: CPTII,S$GLB,, | Performed by: PODIATRIST

## 2023-03-22 PROCEDURE — 1159F PR MEDICATION LIST DOCUMENTED IN MEDICAL RECORD: ICD-10-PCS | Mod: CPTII,S$GLB,, | Performed by: PODIATRIST

## 2023-03-22 PROCEDURE — 99213 OFFICE O/P EST LOW 20 MIN: CPT | Mod: S$GLB,,, | Performed by: PODIATRIST

## 2023-03-22 PROCEDURE — 99213 PR OFFICE/OUTPT VISIT, EST, LEVL III, 20-29 MIN: ICD-10-PCS | Mod: S$GLB,,, | Performed by: PODIATRIST

## 2023-03-22 PROCEDURE — 71250 CT CHEST WITHOUT CONTRAST: ICD-10-PCS | Mod: 26,,, | Performed by: RADIOLOGY

## 2023-03-22 PROCEDURE — 71250 CT THORAX DX C-: CPT | Mod: 26,,, | Performed by: RADIOLOGY

## 2023-03-22 PROCEDURE — 1160F PR REVIEW ALL MEDS BY PRESCRIBER/CLIN PHARMACIST DOCUMENTED: ICD-10-PCS | Mod: CPTII,S$GLB,, | Performed by: PODIATRIST

## 2023-03-22 PROCEDURE — 3008F PR BODY MASS INDEX (BMI) DOCUMENTED: ICD-10-PCS | Mod: CPTII,S$GLB,, | Performed by: PODIATRIST

## 2023-03-22 PROCEDURE — 1159F MED LIST DOCD IN RCRD: CPT | Mod: CPTII,S$GLB,, | Performed by: PODIATRIST

## 2023-03-22 NOTE — PROGRESS NOTES
[facility]    CT chest showed multiple micro nodular lung opacities and enlarged mediastinal and hilar lymph nodes.  I will refer the patient for an urgent pulmonary evaluation.    The CT also noted that his liver appeared abnormal.  I will order an MRI abdomen to further evaluate this.    Roopa Pérez MD

## 2023-03-22 NOTE — TELEPHONE ENCOUNTER
----- Message from Nicole Ventura MA sent at 3/22/2023 10:23 AM CDT -----  Regarding: FW: Urgent Pulmonary referral and MRI abdomen  Good morning,    This patient has multiple lung lesions and lymph nodes in his chest area.  He needs an urgent pulmonary appt, can you help to schedule that? Dr. Pérez placed a referral.    Thanks,   [facility]  [phone]        ----- Message -----  From: Roopa Pérez MD  Sent: 3/22/2023  10:19 AM CDT  To: Nicole Ventura MA  Subject: RE: Urgent Pulmonary referral and MRI abdomen    Thank you, can you let me know what you hear back from them?  ----- Message -----  From: Nicole Ventura MA  Sent: 3/22/2023  10:13 AM CDT  To: Roopa Pérez MD  Subject: RE: Urgent Pulmonary referral and MRI abdomen    Hi,     I will need to send a message to the pulmonary department. The soonest available is in May at . I tried , , and [place].     I can schedule his MRI.   ----- Message -----  From: Roopa Pérez MD  Sent: 3/22/2023   9:51 AM CDT  To: [facility] Clinical Support  Subject: Urgent Pulmonary referral and MRI abdomen        Good morning,    This patient has multiple lung lesions and lymph nodes in his chest area.  He needs an urgent pulmonary appt, can you try to schedule that?    He also needs an MRI abdomen, can you schedule that?    Thanks!    Roopa

## 2023-03-22 NOTE — TELEPHONE ENCOUNTER
----- Message from Roopa Pérez MD sent at 3/22/2023  2:20 PM CDT -----  Regarding: RE: Urgent Pulmonary referral and MRI abdomen  If that is as soon as we can that's ok  ----- Message -----  From: Nicole Ventura MA  Sent: 3/22/2023   1:12 PM CDT  To: Roopa Pérez MD  Subject: FW: Urgent Pulmonary referral and MRI abdomen    4/10. I will have to call him back to schedule the appointment. I just wanted to make sure it was okay to schedule is that far out.    ----- Message -----  From: Roopa Pérez MD  Sent: 3/22/2023  11:58 AM CDT  To: Nicole Ventura MA  Subject: RE: Urgent Pulmonary referral and MRI abdomen    Sure, what day in April?  ----- Message -----  From: Nicole Ventura MA  Sent: 3/22/2023  11:16 AM CDT  To: Roopa Pérez MD  Subject: RE: Urgent Pulmonary referral and MRI abdomen    Ok. Is it ok to schedule his MRI in April? That is the soonest I can schedule it.  ----- Message -----  From: Roopa Pérez MD  Sent: 3/22/2023  10:19 AM CDT  To: Nicole Ventura MA  Subject: RE: Urgent Pulmonary referral and MRI abdomen    Thank you, can you let me know what you hear back from them?  ----- Message -----  From: Nicole Ventura MA  Sent: 3/22/2023  10:13 AM CDT  To: Roopa Pérez MD  Subject: RE: Urgent Pulmonary referral and MRI abdomen    Hi,     I will need to send a message to the pulmonary department. The soonest available is in May at . I tried , , and [place].     I can schedule his MRI.   ----- Message -----  From: Roopa Pérez MD  Sent: 3/22/2023   9:51 AM CDT  To: [facility] Clinical Support  Subject: Urgent Pulmonary referral and MRI abdomen        Good morning,    This patient has multiple lung lesions and lymph nodes in his chest area.  He needs an urgent pulmonary appt, can you try to schedule that?    He also needs an MRI abdomen, can you schedule that?    Thanks!    Roopa

## 2023-03-23 NOTE — PROGRESS NOTES
Subjective:      Patient ID: Catalino Mcfadden is a 57 y.o. male.    Chief Complaint: Diabetes Mellitus, Wound Check, and Follow-up    Catalino is a 57 y.o. male who presents to the clinic for evaluation and treatment of high risk feet. Catalino has a past medical history of Diabetes mellitus, type 2, Diabetic foot ulcer associated with diabetes mellitus due to underlying condition (07/16/2020), ESRD on hemodialysis, Hyperlipidemia, Hypertension, and Obese. Reports new onset left heel blister x several days after a sock rubbed on the left heel. Seen in ED on 11/7/22.     11/16/2022 returns to clinic for follow up of left heel wound.  Seen by my colleague last week, cultures obtained (NGTD) and patient placed on PO abx.  He has kept dressing intact.     11/23/22: F/u left heel ulceration. Presents with calamine coflex left foot.     11/30/22: F/u left heel ulceration. Patient reports going to work this week.     12/14/22: F/u left heel ulceration. Presents in calamine coflex wrap.     12/21/22: F/u left heel ulceration. Presents in calamine coflex wrap.     12/27/22: F/u left heel ulceration. Presents in calamine coflex wrap. No new pedal complaints, continues to work     01/04/23: F/u left heel ulceration. Presents in calamine coflex wrap. No new pedal complaints, continues to work.  Continues to have hiccups      01/11/23: F/u left heel ulceration. Presents in calamine coflex wrap which appears to have padding which shifted medially. No new pedal complaints, continues to work.  Continues to have hiccups    01/18/23: F/u left heel ulceration. Presents in intact calamine coflex wrap. No new pedal complaints.    2/8/23: F/u right foot ulceration. Presents in calamine coflex wrap.     02/15/23: F/u right ulceration. Presents in intact calamine coflex wrap. No new pedal complaints.    3/15/23: F/u right foot ulceration. Presents in calamine coflex wrap.     03/22/23: F/u right ulceration. Presents in intact calamine coflex wrap. No  "new pedal complaints.    PCP: AZIKIWE K. LOMBARD, MD    Date Last Seen by PCP:   Chief Complaint   Patient presents with    Diabetes Mellitus    Wound Check    Follow-up           Hemoglobin A1C   Date Value Ref Range Status   02/22/2023 5.1 4.0 - 5.6 % Final     Comment:     ADA Screening Guidelines:  5.7-6.4%  Consistent with prediabetes  >or=6.5%  Consistent with diabetes    High levels of fetal hemoglobin interfere with the HbA1C  assay. Heterozygous hemoglobin variants (HbS, HgC, etc)do  not significantly interfere with this assay.   However, presence of multiple variants may affect accuracy.     02/23/2022 5.0 4.0 - 5.6 % Final     Comment:     ADA Screening Guidelines:  5.7-6.4%  Consistent with prediabetes  >or=6.5%  Consistent with diabetes    High levels of fetal hemoglobin interfere with the HbA1C  assay. Heterozygous hemoglobin variants (HbS, HgC, etc)do  not significantly interfere with this assay.   However, presence of multiple variants may affect accuracy.     02/09/2022 5.0 4.0 - 5.6 % Final     Comment:     ADA Screening Guidelines:  5.7-6.4%  Consistent with prediabetes  >or=6.5%  Consistent with diabetes    High levels of fetal hemoglobin interfere with the HbA1C  assay. Heterozygous hemoglobin variants (HbS, HgC, etc)do  not significantly interfere with this assay.   However, presence of multiple variants may affect accuracy.         Review of Systems   Constitutional: Negative for chills.   Cardiovascular:  Negative for chest pain and claudication.   Respiratory:  Negative for cough.    Skin:  Positive for color change, dry skin and nail changes.   Musculoskeletal:  Positive for joint pain.   Gastrointestinal:  Negative for nausea.   Neurological:  Positive for paresthesias. Negative for numbness.   Psychiatric/Behavioral:  The patient is not nervous/anxious.          Objective:      Vitals:    03/22/23 0712   Weight: 113.4 kg (250 lb)   Height: 6' 1" (1.854 m)   PainSc: 0-No pain "       Physical Exam  Vitals and nursing note reviewed.   Constitutional:       General: He is not in acute distress.     Appearance: He is not toxic-appearing or diaphoretic.      Comments: Pt. is well-developed, well-nourished, appears stated age, in no acute distress, alert and oriented x 3. No evidence of depression, anxiety, or agitation. Calm, cooperative, and communicative. Appropriate interactions and affect.   Cardiovascular:      Pulses:           Dorsalis pedis pulses are 2+ on the right side and 2+ on the left side.        Posterior tibial pulses are 1+ on the right side and 1+ on the left side.      Comments: There is decreased digital hair. Skin is atrophic, slightly hyperpigmented  Pulmonary:      Effort: No respiratory distress.   Musculoskeletal:      Right lower leg: Edema present.      Left lower leg: Edema present.      Right ankle: Swelling present. No tenderness. No lateral malleolus, medial malleolus, AITF ligament, CF ligament or posterior TF ligament tenderness.      Right Achilles Tendon: No defects. Hilliard's test negative.      Left ankle: Swelling present. No tenderness. No lateral malleolus, medial malleolus, AITF ligament, CF ligament or posterior TF ligament tenderness.      Left Achilles Tendon: No defects. Hilliard's test negative.      Right foot: No tenderness or bony tenderness.      Left foot: No tenderness or bony tenderness.      Comments: Decreased stride, station of gait.  apropulsive toe off.  Increased angle and base of gait.    There is equinus deformity bilateral with decreased dorsiflexion at the ankle joint bilateral. No tenderness with compression of heel. Negative tinels sign. Gait analysis reveals excessive pronation through midstance and propulsion with early heel off.     Patient has hammertoes of digits 2-5 bilateral partially reducible     Decreased first MPJ range of motion both weightbearing and nonweightbearing, no crepitus observed the first MP joint, +  dorsal flag sign.     Visible and palpable bunion without pain at dorsomedial 1st metatarsal head right and left.  Hallux abducted right and left partially reducible, tracks laterally without being track bound.  No ecchymosis, erythema, edema, or cardinal signs infection or signs of trauma same foot.    Fat pad atrophy to heels and met heads bilateral    Plantarflexed 1st ray RLE   Lymphadenopathy:      Comments: No lymphatic streaking    Negative lymphadenopathy bilateral popliteal fossa and tarsal tunnel.     Skin:     General: Skin is warm and dry.      Coloration: Skin is not pale.      Findings: Lesion (see wound descriptions below) present. No ecchymosis, laceration or rash.      Nails: There is no clubbing.      Comments: Toenails 1-5 bilaterally discolored/yellowed, dystrophic, brittle with subungual debris.   Superficial abrasion left dorsal lateral foot.    Focal hyperkeratotic lesion consisting entirely of hyperkeratotic tissue without open skin, drainage, pus, fluctuance, malodor, or signs of infection:  left plantar foot.       Neurological:      Sensory: Sensory deficit present.      Comments:  [place] 5.07 monofilamant testing is diminished Kp feet. Decreased/absent vibratory sensation bilateral feet to 128Hz tuning fork.     Paresthesias, and hyperesthesia bilateral feet with no clearly identified trigger or source.           Psychiatric:         Attention and Perception: He is attentive.         Mood and Affect: Mood is not anxious. Affect is not inappropriate.         Speech: He is communicative. Speech is not slurred.         Behavior: Behavior is not combative.     3/22/23        3/15/23:        2/15/23                    2/8/23:  Post hudson: 0.5ccmx0.5cmx0.1cm red granular base right medial foot ulceration.             01/18/23:               01/11/23:        01/04/23:        12/27/22:         12/21/22:          11/30/22 11/16/2022    Some areas of deeper pressure noted but  otherwise stable            11/9/22:  S/p drainage left heel bullae formation serous drainage noted.             Assessment:       Encounter Diagnoses   Name Primary?    Type 2 diabetes mellitus with stage 5 chronic kidney disease Yes    Healed ulcer of left foot on examination     Healed ulcer of right foot on examination                          Plan:       Catalino was seen today for diabetes mellitus, wound check and follow-up.    Diagnoses and all orders for this visit:    Type 2 diabetes mellitus with stage 5 chronic kidney disease    Healed ulcer of left foot on examination    Healed ulcer of right foot on examination          I counseled the patient on his conditions, their implications and medical management.    Education about the prevention of limb loss.    Discussed wound healing cycle, skin integrity, ways to care for skin.Counseled patient on the effects of biomechanical pressure on healing. He verbalizes understanding that it can increase the chances of delayed healing and this prolonged exposure leads to infection or progression of infection which subsequently can result in loss of limb.    Adequate vitamin supplementation, protein intake, and hydration - discussed with patient    All wounds healed but vulnerable for skin breakdown noted. Ok to convert to supportive shoe gear. Skin is still delicate therefore patient must be diligent in avoiding excessive pressure and making sure there is adequate support and padding in shoe gear.     Follow-up: 3-5 weeks but should call [facility] immediately if any signs of infection, such as fever, chills, sweats, increased redness or pain.    Short-term goals include maintaining good offloading and minimizing bioburden, promoting granulation and epithelialization to healing.  Long-term goals include keeping the wound healed by good offloading and medical management under the direction of internist.    Shoe inspection. Diabetic Foot Education. Patient reminded of the  importance of good nutrition and blood sugar control to help prevent podiatric complications of diabetes. Patient instructed on proper foot hygeine. We discussed wearing proper shoe gear, daily foot inspections, never walking without protective shoe gear, never putting sharp instruments to feet.

## 2023-03-24 ENCOUNTER — OFFICE VISIT (OUTPATIENT)
Dept: PULMONOLOGY | Facility: CLINIC | Age: 58
End: 2023-03-24
Payer: COMMERCIAL

## 2023-03-24 VITALS
WEIGHT: 243.19 LBS | BODY MASS INDEX: 32.23 KG/M2 | DIASTOLIC BLOOD PRESSURE: 94 MMHG | HEIGHT: 73 IN | SYSTOLIC BLOOD PRESSURE: 188 MMHG | HEART RATE: 96 BPM | OXYGEN SATURATION: 98 %

## 2023-03-24 DIAGNOSIS — J84.10 CALCIFIED GRANULOMA OF LUNG: ICD-10-CM

## 2023-03-24 DIAGNOSIS — J39.8 TRACHEOBRONCHOMALACIA: ICD-10-CM

## 2023-03-24 DIAGNOSIS — Z99.2 ESRD ON HEMODIALYSIS: Primary | ICD-10-CM

## 2023-03-24 DIAGNOSIS — R91.8 ABNORMAL FINDINGS ON DIAGNOSTIC IMAGING OF LUNG: ICD-10-CM

## 2023-03-24 DIAGNOSIS — N18.6 ESRD ON HEMODIALYSIS: Primary | ICD-10-CM

## 2023-03-24 PROBLEM — J98.4 CALCIFIED GRANULOMA OF LUNG: Status: ACTIVE | Noted: 2023-03-24

## 2023-03-24 PROCEDURE — 3080F PR MOST RECENT DIASTOLIC BLOOD PRESSURE >= 90 MM HG: ICD-10-PCS | Mod: CPTII,S$GLB,, | Performed by: NURSE PRACTITIONER

## 2023-03-24 PROCEDURE — 99999 PR PBB SHADOW E&M-EST. PATIENT-LVL V: ICD-10-PCS | Mod: PBBFAC,,, | Performed by: NURSE PRACTITIONER

## 2023-03-24 PROCEDURE — 99204 PR OFFICE/OUTPT VISIT, NEW, LEVL IV, 45-59 MIN: ICD-10-PCS | Mod: S$GLB,,, | Performed by: NURSE PRACTITIONER

## 2023-03-24 PROCEDURE — 3080F DIAST BP >= 90 MM HG: CPT | Mod: CPTII,S$GLB,, | Performed by: NURSE PRACTITIONER

## 2023-03-24 PROCEDURE — 3008F BODY MASS INDEX DOCD: CPT | Mod: CPTII,S$GLB,, | Performed by: NURSE PRACTITIONER

## 2023-03-24 PROCEDURE — 99999 PR PBB SHADOW E&M-EST. PATIENT-LVL V: CPT | Mod: PBBFAC,,, | Performed by: NURSE PRACTITIONER

## 2023-03-24 PROCEDURE — 3077F PR MOST RECENT SYSTOLIC BLOOD PRESSURE >= 140 MM HG: ICD-10-PCS | Mod: CPTII,S$GLB,, | Performed by: NURSE PRACTITIONER

## 2023-03-24 PROCEDURE — 1111F PR DISCHARGE MEDS RECONCILED W/ CURRENT OUTPATIENT MED LIST: ICD-10-PCS | Mod: CPTII,S$GLB,, | Performed by: NURSE PRACTITIONER

## 2023-03-24 PROCEDURE — 99204 OFFICE O/P NEW MOD 45 MIN: CPT | Mod: S$GLB,,, | Performed by: NURSE PRACTITIONER

## 2023-03-24 PROCEDURE — 1111F DSCHRG MED/CURRENT MED MERGE: CPT | Mod: CPTII,S$GLB,, | Performed by: NURSE PRACTITIONER

## 2023-03-24 PROCEDURE — 3044F HG A1C LEVEL LT 7.0%: CPT | Mod: CPTII,S$GLB,, | Performed by: NURSE PRACTITIONER

## 2023-03-24 PROCEDURE — 3008F PR BODY MASS INDEX (BMI) DOCUMENTED: ICD-10-PCS | Mod: CPTII,S$GLB,, | Performed by: NURSE PRACTITIONER

## 2023-03-24 PROCEDURE — 3044F PR MOST RECENT HEMOGLOBIN A1C LEVEL <7.0%: ICD-10-PCS | Mod: CPTII,S$GLB,, | Performed by: NURSE PRACTITIONER

## 2023-03-24 PROCEDURE — 1159F MED LIST DOCD IN RCRD: CPT | Mod: CPTII,S$GLB,, | Performed by: NURSE PRACTITIONER

## 2023-03-24 PROCEDURE — 1159F PR MEDICATION LIST DOCUMENTED IN MEDICAL RECORD: ICD-10-PCS | Mod: CPTII,S$GLB,, | Performed by: NURSE PRACTITIONER

## 2023-03-24 PROCEDURE — 3077F SYST BP >= 140 MM HG: CPT | Mod: CPTII,S$GLB,, | Performed by: NURSE PRACTITIONER

## 2023-03-24 NOTE — PATIENT INSTRUCTIONS
Concern for infectious etiology vs sarcoidosis.     Will discuss case with collaborating MD and call you with updated plan of care.    I have ordered sputum cultures - you will leave the office today with sputum specimen cups. Bring to any [facility] within 4 hours of obtaining specimen. Rinse your mouth prior to collecting sample. Please collect sample in the morning by deep coughing prior to eating or drinking.       Continue current medication regiment. Keep follow up appointment as scheduled. Please call the office if you have any questions or concerns.

## 2023-03-24 NOTE — PROGRESS NOTES
3/24/2023    Catalino Mcfadden  New Patient Consult    Chief Complaint   Patient presents with    Abnormal Ct Scan     Abnormal finding.        HPI:  03/24/2023:  In office today alone. Denies being seen by prior Pulmonologist. Denies history of lung disease.  Denies current use of inhaler therapy, supplemental oxygen, CPAP.  Denies personal history of cancer.  Denies personal history of PE, anticoagulation use.  Hx:  End-stage renal disease on HD Tuesday,Thursday, Saturday, type 2 diabetes, hypertension.  Denies shortness of breath, wheezing, chest tightness.  Denies cough, mucus production.  Has been suffering with hiccups for approximately 8 months now, following with GI.  States the only time he appreciates shortness of breath is whenever his hiccups are flaring.          Social Hx: Lives with friend -no animals in the home. Works ship transporter.  No Asbestosis exposure, Smoking Hx:  Never smoker  Family Hx: Lung Cancer, COPD, Asthma - family history limited as patient was adopted.  Medical Hx:  No previous pneumonia ; No previous shoulder surgery - Cornell catheter placement in [id number]          The chief compliant  problem is new to me.   PFSH:  Past Medical History:   Diagnosis Date    Diabetes mellitus, type 2     Diabetic foot ulcer associated with diabetes mellitus due to underlying condition 07/16/2020    ESRD on hemodialysis     Hyperlipidemia     Hypertension     Obese          Past Surgical History:   Procedure Laterality Date    BONE BIOPSY Left 7/17/2020    Procedure: BIOPSY, BONE;  Surgeon: Verona Whitmore DPM;  Location: [facility] OR;  Service: Podiatry;  Laterality: Left;    CERVICAL DISC SURGERY  07/11/2016    DEBRIDEMENT Left 7/17/2020    Procedure: DEBRIDEMENT;  Surgeon: Verona Whitmore DPM;  Location: [facility] OR;  Service: Podiatry;  Laterality: Left;    DEBRIDEMENT OF FOOT Right     toes & plantar surface    ESOPHAGOGASTRODUODENOSCOPY N/A 12/16/2022    Procedure: EGD (ESOPHAGOGASTRODUODENOSCOPY);   Surgeon: Eren Francois MD;  Location: [facility] ENDO;  Service: Endoscopy;  Laterality: N/A;  Pt. on Dialysis. K+ ordered prior to procedure.EC    FRACTURE SURGERY Right     medial ankle, metal plate present    INSERTION OF TUNNELED CENTRAL VENOUS CATHETER (CVC) WITH SUBCUTANEOUS PORT N/A 6/11/2021    Procedure: TUNNEL CATH INSERTION WITHOUT PORT;  Surgeon: Jose Manuel Diagnostic Provider;  Location: [facility] OR;  Service: Radiology;  Laterality: N/A;  11AM START---PHONE PREOP 6/10/21---COVID POSTIVE ON 7/2020---NO S/S    left leg orthopedic surgery Left      Social History     Tobacco Use    Smoking status: Never    Smokeless tobacco: Never   Substance Use Topics    Alcohol use: Yes     Comment: social    Drug use: No     Family History   Problem Relation Age of Onset    Heart disease Father     Colon cancer Neg Hx     Esophageal cancer Neg Hx      Review of patient's allergies indicates:  No Known Allergies  I have reviewed past medical, family, and social history. I have reviewed previous nurse notes.    Performance Status:The patient's activity level is no limits with regular activity.      Review of Systems   Constitutional:  Positive for appetite change. Negative for activity change, chills, diaphoresis, fatigue, fever and unexpected weight change.   HENT:  Negative for congestion, nosebleeds, postnasal drip, sinus pressure, sinus pain, sore throat and trouble swallowing.    Eyes:  Negative for photophobia and visual disturbance.   Respiratory:  Negative for cough, choking, chest tightness, shortness of breath and wheezing.    Cardiovascular:  Positive for leg swelling. Negative for chest pain and palpitations.   Gastrointestinal:  Negative for abdominal distention, abdominal pain, blood in stool, constipation, diarrhea, nausea and vomiting.        Hiccups     Genitourinary:  Negative for difficulty urinating, dysuria, flank pain and hematuria.   Musculoskeletal:  Negative for back pain, gait problem, joint swelling and  "neck pain.   Skin:  Negative for rash and wound.   Allergic/Immunologic: Negative for environmental allergies and food allergies.   Neurological:  Negative for dizziness, seizures, syncope, weakness, light-headedness, numbness and headaches.   Hematological:  Does not bruise/bleed easily.   Psychiatric/Behavioral:  Negative for confusion and sleep disturbance. The patient is not nervous/anxious.        Exam:Comprehensive exam done. BP (!) 188/94 (BP Location: Right arm, Patient Position: Sitting, BP Method: Medium (Automatic))   Pulse 96   Ht 6' 1" (1.854 m)   Wt 110.3 kg (243 lb 2.7 oz)   SpO2 98% Comment: room air at rest  BMI 32.08 kg/m²   Exam included Vitals as listed  Constitutional: She is oriented to person, place, and time. She appears well-developed. No distress.   Nose: Nose normal.   Mouth/Throat: Uvula is midline, oropharynx is clear and moist and mucous membranes are normal. No dental caries. No oropharyngeal exudate, posterior oropharyngeal edema, posterior oropharyngeal erythema or tonsillar abscesses.  Eyes: Pupils are equal, round, and reactive to light.   Neck: No JVD present. No thyromegaly present.   Cardiovascular: Normal rate, regular rhythm and normal heart sounds. Exam reveals no gallop and no friction rub.   Murmur heard.  Pulmonary/Chest: Effort normal and breath sounds normal. No accessory muscle usage or stridor. No apnea and no tachypnea. No respiratory distress, decreased breath sounds, wheezes, rhonchi, rales, or tenderness. Clear breath sounds throughout, diminished breath sounds to bilateral lower lung fields. On room air, in no acute distress.   Abdominal: Soft. She exhibits no mass. There is no tenderness. No hepatosplenomegaly, hernias and normoactive bowel sounds  Musculoskeletal: Normal range of motion. exhibits no edema.   Neurological:  alert and oriented to person, place, and time. not disoriented.   Skin: Skin is warm and dry. Capillary refill takes less 2 sec. No " cyanosis or erythema. No pallor. Nails show no clubbing.   Psychiatric: normal mood and affect. behavior is normal. Judgment and thought content normal.       Radiographs (ct chest and cxr) reviewed: view by direct vision     CT Chest Without Contrast3/22/2023 FINDINGS:  Heart and mediastinal vessels: Borderline enlarged.  There is a small pericardial effusion.  There is a right jugular tunnel dialysis catheter present with the tip extending well into the right atrium.  There is aortic and coronary atherosclerosis.   Dian/Mediastinum: There are increased numbers of borderline caliber to mildly enlarged diffuse mediastinal and probable hilar lymph nodes measuring up to 13 mm in short axis.  There is some mild dilatation of the mid and upper thoracic esophagus.   Lungs/Pleura: Examination is obtained in partial expiration, noting collapse of the posterior tracheal wall and increased AP diameter of the trachea compatible with tracheobronchomalacia.  There is a calcified granuloma in the left lung base.  There are bilateral upper lobe and right middle lobe tiny micro nodular lung opacities with both a Alicia lymphatic and peribronchovascular distribution.  No significant bronchiectasis.   Upper Abdomen: There is heterogeneous attenuation of the liver and geographic areas of decreased attenuation.   Bones: Unremarkable.   Impression:   Low lung volumes, pericardial effusion, atherosclerosis and tracheobronchial malacia.   Upper lobe predominant Alicia lymphatic and peribronchovascular micro nodular lung opacities and abnormal appearance of the liver.  Findings may reflect metastatic disease although an infectious/inflammatory etiology such as granulomatous disease/sarcoidosis could have this appearance.  Further workup is warranted.   This report was flagged in Epic as abnormal.       X-Ray Chest 1 View 2/22/2023 FINDINGS:  Grossly unchanged position of right internal jugular approach central venous catheter.   Similar  cardiomediastinal contours, again noting enlargement the cardiac silhouette.  Prominence and indistinctness of the central pulmonary vasculature.  Prominent interstitial markings are noted.   No definite pneumothorax or large volume pleural effusion.  No acute findings in the visualized abdomen.  Osseous and soft tissue structures appear without definite acute change.  Postoperative sequela again project in the cervical spine.   Impression:   Findings would be in keeping with pulmonary vascular congestion/mild edema.  Infectious or inflammatory etiology would be difficult to entirely exclude.         Labs reviewed    Lab Results   Component Value Date    WBC 4.72 02/22/2023    RBC 3.71 (L) 02/22/2023    HGB 10.3 (L) 02/22/2023    HCT 31.1 (L) 02/22/2023    MCV 84 02/22/2023    MCH 27.8 02/22/2023    MCHC 33.1 02/22/2023    RDW 13.0 02/22/2023     (L) 02/22/2023    MPV 9.5 02/22/2023    GRAN 3.3 02/22/2023    GRAN 69.2 02/22/2023    LYMPH 0.8 (L) 02/22/2023    LYMPH 16.3 (L) 02/22/2023    MONO 0.5 02/22/2023    MONO 11.2 02/22/2023    EOS 0.1 02/22/2023    BASO 0.04 02/22/2023    EOSINOPHIL 2.1 02/22/2023    BASOPHIL 0.8 02/22/2023       PFT will be done and results to be reviewed  Pulmonary Functions Testing Results:        Plan:  Clinical impression is ambiguous and will need repeated evaluation wrt will require conference with MD Christofer Bae was seen today for abnormal ct scan.    Diagnoses and all orders for this visit:    ESRD on hemodialysis    Abnormal findings on diagnostic imaging of lung  -     Ambulatory referral/consult to Pulmonology  -     Culture, Respiratory with Gram Stain; Standing  -     AFB Culture & Smear; Standing    Calcified granuloma of lung    Tracheobronchomalacia        Follow up in about 2 months (around 5/24/2023), or if symptoms worsen or fail to improve.    Discussed with patient above for education the following:      Patient Instructions   Concern for infectious etiology vs  sarcoidosis.     Will discuss case with collaborating MD and call you with updated plan of care.    I have ordered sputum cultures - you will leave the office today with sputum specimen cups. Bring to any [facility] within 4 hours of obtaining specimen. Rinse your mouth prior to collecting sample. Please collect sample in the morning by deep coughing prior to eating or drinking.       Continue current medication regiment. Keep follow up appointment as scheduled. Please call the office if you have any questions or concerns.

## 2023-03-27 ENCOUNTER — HOSPITAL ENCOUNTER (OUTPATIENT)
Facility: HOSPITAL | Age: 58
Discharge: HOME | End: 2023-03-27
Attending: INTERNAL MEDICINE | Admitting: INTERNAL MEDICINE
Payer: COMMERCIAL

## 2023-03-27 VITALS
TEMPERATURE: 99 F | WEIGHT: 230 LBS | RESPIRATION RATE: 15 BRPM | SYSTOLIC BLOOD PRESSURE: 180 MMHG | HEART RATE: 86 BPM | OXYGEN SATURATION: 99 % | DIASTOLIC BLOOD PRESSURE: 96 MMHG | BODY MASS INDEX: 30.48 KG/M2 | HEIGHT: 73 IN

## 2023-03-27 DIAGNOSIS — R13.10 DYSPHAGIA: ICD-10-CM

## 2023-03-27 LAB — POCT GLUCOSE: 100 MG/DL (ref 70–110)

## 2023-03-27 PROCEDURE — 25000003 PHARM REV CODE 250: Performed by: INTERNAL MEDICINE

## 2023-03-27 PROCEDURE — 91010 ESOPHAGUS MOTILITY STUDY: CPT | Mod: TC | Performed by: INTERNAL MEDICINE

## 2023-03-27 PROCEDURE — 91037 ESOPH IMPED FUNCTION TEST: CPT | Mod: TC | Performed by: INTERNAL MEDICINE

## 2023-03-27 RX ORDER — LIDOCAINE HYDROCHLORIDE 20 MG/ML
JELLY TOPICAL ONCE
Status: COMPLETED | OUTPATIENT
Start: 2023-03-27 | End: 2023-03-27

## 2023-03-27 RX ADMIN — LIDOCAINE HYDROCHLORIDE 10 ML: 20 JELLY TOPICAL at 08:03

## 2023-03-27 NOTE — PLAN OF CARE
Pt tolerated procedure well. Full protocol followed with 200cc NS bolus and r/o belching and r/o rumination study. Pt verbalized understanding of AVS

## 2023-03-28 PROCEDURE — 91010 ESOPHAGUS MOTILITY STUDY: CPT | Mod: 26,,, | Performed by: INTERNAL MEDICINE

## 2023-03-28 PROCEDURE — 91037 ESOPH IMPED FUNCTION TEST: CPT | Mod: 26,,, | Performed by: INTERNAL MEDICINE

## 2023-03-28 PROCEDURE — 91037 PR GERD TST W/ NASAL IMPEDENCE ELECTROD: ICD-10-PCS | Mod: 26,,, | Performed by: INTERNAL MEDICINE

## 2023-03-28 PROCEDURE — 91010 PR ESOPHAGEAL MOTILITY STUDY, MA2METRY: ICD-10-PCS | Mod: 26,,, | Performed by: INTERNAL MEDICINE

## 2023-04-04 ENCOUNTER — TELEPHONE (OUTPATIENT)
Dept: PULMONOLOGY | Facility: CLINIC | Age: 58
End: 2023-04-04
Payer: COMMERCIAL

## 2023-04-04 NOTE — TELEPHONE ENCOUNTER
LVM to schedule appointment.     ----- Message from Angelo Huynh MD sent at 4/4/2023  4:23 PM CDT -----  Regarding: new patient appt  Can we please schedule Mr. Mcfadden to see me for the first time whenever I may have an opening? It's for an EBUS consultation.     Thank you,  CALEB

## 2023-04-06 ENCOUNTER — PATIENT MESSAGE (OUTPATIENT)
Dept: GASTROENTEROLOGY | Facility: CLINIC | Age: 58
End: 2023-04-06
Payer: COMMERCIAL

## 2023-04-06 DIAGNOSIS — R11.2 NAUSEA AND VOMITING, UNSPECIFIED VOMITING TYPE: Primary | ICD-10-CM

## 2023-04-10 ENCOUNTER — TELEPHONE (OUTPATIENT)
Dept: GASTROENTEROLOGY | Facility: CLINIC | Age: 58
End: 2023-04-10
Payer: COMMERCIAL

## 2023-04-10 RX ORDER — ONDANSETRON 4 MG/1
4 TABLET, ORALLY DISINTEGRATING ORAL EVERY 6 HOURS PRN
Qty: 30 TABLET | Refills: 3 | Status: ON HOLD | OUTPATIENT
Start: 2023-04-10 | End: 2023-12-12 | Stop reason: HOSPADM

## 2023-04-10 RX ORDER — PROMETHAZINE HYDROCHLORIDE 12.5 MG/1
12.5 TABLET ORAL EVERY 6 HOURS PRN
Qty: 30 TABLET | Refills: 3 | Status: SHIPPED | OUTPATIENT
Start: 2023-04-10

## 2023-04-10 NOTE — TELEPHONE ENCOUNTER
MA attempted to contact patient to schedule his gastric emptying study, but he did not answer. A voicemail was left for patient to return a call to our office.

## 2023-04-11 ENCOUNTER — TELEPHONE (OUTPATIENT)
Dept: GASTROENTEROLOGY | Facility: CLINIC | Age: 58
End: 2023-04-11
Payer: COMMERCIAL

## 2023-04-11 NOTE — TELEPHONE ENCOUNTER
MA attempted to contact patient on 4/10 and 4/11 to schedule his GES, but patient did not answer. A voicemail was left for patient to return a call to our office.

## 2023-04-12 ENCOUNTER — OFFICE VISIT (OUTPATIENT)
Dept: PODIATRY | Facility: CLINIC | Age: 58
End: 2023-04-12
Payer: COMMERCIAL

## 2023-04-12 VITALS — BODY MASS INDEX: 30.47 KG/M2 | WEIGHT: 229.94 LBS | HEIGHT: 73 IN

## 2023-04-12 DIAGNOSIS — Z87.2 HEALED ULCER OF RIGHT FOOT ON EXAMINATION: ICD-10-CM

## 2023-04-12 DIAGNOSIS — E11.22 TYPE 2 DIABETES MELLITUS WITH STAGE 5 CHRONIC KIDNEY DISEASE: Primary | ICD-10-CM

## 2023-04-12 DIAGNOSIS — Z87.2 HEALED ULCER OF LEFT FOOT ON EXAMINATION: ICD-10-CM

## 2023-04-12 DIAGNOSIS — N18.5 TYPE 2 DIABETES MELLITUS WITH STAGE 5 CHRONIC KIDNEY DISEASE: Primary | ICD-10-CM

## 2023-04-12 DIAGNOSIS — Z86.31 HISTORY OF DIABETIC ULCER OF FOOT: ICD-10-CM

## 2023-04-12 PROCEDURE — 3044F PR MOST RECENT HEMOGLOBIN A1C LEVEL <7.0%: ICD-10-PCS | Mod: CPTII,S$GLB,, | Performed by: PODIATRIST

## 2023-04-12 PROCEDURE — 99999 PR PBB SHADOW E&M-EST. PATIENT-LVL IV: CPT | Mod: PBBFAC,,, | Performed by: PODIATRIST

## 2023-04-12 PROCEDURE — 99213 OFFICE O/P EST LOW 20 MIN: CPT | Mod: S$GLB,,, | Performed by: PODIATRIST

## 2023-04-12 PROCEDURE — 1159F PR MEDICATION LIST DOCUMENTED IN MEDICAL RECORD: ICD-10-PCS | Mod: CPTII,S$GLB,, | Performed by: PODIATRIST

## 2023-04-12 PROCEDURE — 3044F HG A1C LEVEL LT 7.0%: CPT | Mod: CPTII,S$GLB,, | Performed by: PODIATRIST

## 2023-04-12 PROCEDURE — 1160F PR REVIEW ALL MEDS BY PRESCRIBER/CLIN PHARMACIST DOCUMENTED: ICD-10-PCS | Mod: CPTII,S$GLB,, | Performed by: PODIATRIST

## 2023-04-12 PROCEDURE — 99213 PR OFFICE/OUTPT VISIT, EST, LEVL III, 20-29 MIN: ICD-10-PCS | Mod: S$GLB,,, | Performed by: PODIATRIST

## 2023-04-12 PROCEDURE — 99999 PR PBB SHADOW E&M-EST. PATIENT-LVL IV: ICD-10-PCS | Mod: PBBFAC,,, | Performed by: PODIATRIST

## 2023-04-12 PROCEDURE — 1160F RVW MEDS BY RX/DR IN RCRD: CPT | Mod: CPTII,S$GLB,, | Performed by: PODIATRIST

## 2023-04-12 PROCEDURE — 3008F PR BODY MASS INDEX (BMI) DOCUMENTED: ICD-10-PCS | Mod: CPTII,S$GLB,, | Performed by: PODIATRIST

## 2023-04-12 PROCEDURE — 1159F MED LIST DOCD IN RCRD: CPT | Mod: CPTII,S$GLB,, | Performed by: PODIATRIST

## 2023-04-12 PROCEDURE — 3008F BODY MASS INDEX DOCD: CPT | Mod: CPTII,S$GLB,, | Performed by: PODIATRIST

## 2023-04-12 NOTE — PROGRESS NOTES
Subjective:      Patient ID: Catalino Mcfadden is a 58 y.o. male.    Chief Complaint: Diabetes Mellitus (6/17/22 Dr Lombard PCP) and Wound Check    Catalino is a 58 y.o. male who presents to the clinic for evaluation and treatment of high risk feet. Catalino has a past medical history of Diabetes mellitus, type 2, Diabetic foot ulcer associated with diabetes mellitus due to underlying condition (07/16/2020), ESRD on hemodialysis, Hyperlipidemia, Hypertension, and Obese. Reports new onset left heel blister x several days after a sock rubbed on the left heel. Seen in ED on 11/7/22.     11/16/2022 returns to clinic for follow up of left heel wound.  Seen by my colleague last week, cultures obtained (NGTD) and patient placed on PO abx.  He has kept dressing intact.     11/23/22: F/u left heel ulceration. Presents with calamine coflex left foot.     11/30/22: F/u left heel ulceration. Patient reports going to work this week.     12/14/22: F/u left heel ulceration. Presents in calamine coflex wrap.     12/21/22: F/u left heel ulceration. Presents in calamine coflex wrap.     12/27/22: F/u left heel ulceration. Presents in calamine coflex wrap. No new pedal complaints, continues to work     01/04/23: F/u left heel ulceration. Presents in calamine coflex wrap. No new pedal complaints, continues to work.  Continues to have hiccups      01/11/23: F/u left heel ulceration. Presents in calamine coflex wrap which appears to have padding which shifted medially. No new pedal complaints, continues to work.  Continues to have hiccups    01/18/23: F/u left heel ulceration. Presents in intact calamine coflex wrap. No new pedal complaints.    2/8/23: F/u right foot ulceration. Presents in calamine coflex wrap.     02/15/23: F/u right ulceration. Presents in intact calamine coflex wrap. No new pedal complaints.    3/15/23: F/u right foot ulceration. Presents in calamine coflex wrap.     03/22/23: F/u right ulceration. Presents in intact calamine  coflex wrap. No new pedal complaints.    04/12/23: Returns to clinic for foot inspection following achieving status of healed to bilateral foot wounds.  He has been attempting to care for feet as instructed. 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 Present in NB tennis shoes.  States he became a vegan on 04/04/2023. No new pedal complaints.    PCP: AZIKIWE K. LOMBARD, MD    Date Last Seen by PCP:   Chief Complaint   Patient presents with    Diabetes Mellitus     6/17/22 Dr Lombard PCP    Wound Check           Hemoglobin A1C   Date Value Ref Range Status   02/22/2023 5.1 4.0 - 5.6 % Final     Comment:     ADA Screening Guidelines:  5.7-6.4%  Consistent with prediabetes  >or=6.5%  Consistent with diabetes    High levels of fetal hemoglobin interfere with the HbA1C  assay. Heterozygous hemoglobin variants (HbS, HgC, etc)do  not significantly interfere with this assay.   However, presence of multiple variants may affect accuracy.     02/23/2022 5.0 4.0 - 5.6 % Final     Comment:     ADA Screening Guidelines:  5.7-6.4%  Consistent with prediabetes  >or=6.5%  Consistent with diabetes    High levels of fetal hemoglobin interfere with the HbA1C  assay. Heterozygous hemoglobin variants (HbS, HgC, etc)do  not significantly interfere with this assay.   However, presence of multiple variants may affect accuracy.     02/09/2022 5.0 4.0 - 5.6 % Final     Comment:     ADA Screening Guidelines:  5.7-6.4%  Consistent with prediabetes  >or=6.5%  Consistent with diabetes    High levels of fetal hemoglobin interfere with the HbA1C  assay. Heterozygous hemoglobin variants (HbS, HgC, etc)do  not significantly interfere with this assay.   However, presence of multiple variants may affect accuracy.         Review of Systems   Constitutional: Negative for chills.   Cardiovascular:  Negative for chest pain and claudication.   Respiratory:  Negative for cough.    Skin:  Positive for color change,  "dry skin and nail changes.   Musculoskeletal:  Positive for joint pain.   Gastrointestinal:  Negative for nausea.   Neurological:  Positive for paresthesias. Negative for numbness.   Psychiatric/Behavioral:  The patient is not nervous/anxious.          Objective:      Vitals:    04/12/23 0653   Weight: 104.3 kg (229 lb 15 oz)   Height: 6' 1" (1.854 m)   PainSc: 0-No pain       Physical Exam  Vitals and nursing note reviewed.   Constitutional:       General: He is not in acute distress.     Appearance: He is not toxic-appearing or diaphoretic.      Comments: Pt. is well-developed, well-nourished, appears stated age, in no acute distress, alert and oriented x 3. No evidence of depression, anxiety, or agitation. Calm, cooperative, and communicative. Appropriate interactions and affect.   Cardiovascular:      Pulses:           Dorsalis pedis pulses are 2+ on the right side and 2+ on the left side.        Posterior tibial pulses are 1+ on the right side and 1+ on the left side.      Comments: There is decreased digital hair. Skin is atrophic, slightly hyperpigmented  Pulmonary:      Effort: No respiratory distress.   Musculoskeletal:      Right lower leg: Edema present.      Left lower leg: Edema present.      Right ankle: Swelling present. No tenderness. No lateral malleolus, medial malleolus, AITF ligament, CF ligament or posterior TF ligament tenderness.      Right Achilles Tendon: No defects. Hilliard's test negative.      Left ankle: Swelling present. No tenderness. No lateral malleolus, medial malleolus, AITF ligament, CF ligament or posterior TF ligament tenderness.      Left Achilles Tendon: No defects. Hilliard's test negative.      Right foot: No tenderness or bony tenderness.      Left foot: No tenderness or bony tenderness.      Comments: Decreased stride, station of gait.  apropulsive toe off.  Increased angle and base of gait.    There is equinus deformity bilateral with decreased dorsiflexion at the ankle " joint bilateral. No tenderness with compression of heel. Negative tinels sign. Gait analysis reveals excessive pronation through midstance and propulsion with early heel off.     Patient has hammertoes of digits 2-5 bilateral partially reducible     Decreased first MPJ range of motion both weightbearing and nonweightbearing, no crepitus observed the first MP joint, + dorsal flag sign.     Visible and palpable bunion without pain at dorsomedial 1st metatarsal head right and left.  Hallux abducted right and left partially reducible, tracks laterally without being track bound.  No ecchymosis, erythema, edema, or cardinal signs infection or signs of trauma same foot.    Fat pad atrophy to heels and met heads bilateral    Plantarflexed 1st ray RLE   Lymphadenopathy:      Comments: No lymphatic streaking    Negative lymphadenopathy bilateral popliteal fossa and tarsal tunnel.     Skin:     General: Skin is warm and dry.      Coloration: Skin is not pale.      Findings: Lesion (see wound descriptions below) present. No ecchymosis, laceration or rash.      Nails: There is no clubbing.      Comments: Toenails 1-5 bilaterally discolored/yellowed, dystrophic, brittle with subungual debris.   Superficial abrasion left dorsal lateral foot.    Focal hyperkeratotic lesion consisting entirely of hyperkeratotic tissue without open skin, drainage, pus, fluctuance, malodor, or signs of infection:  left plantar foot.       Neurological:      Sensory: Sensory deficit present.      Comments:  [place] 5.07 monofilamant testing is diminished Kp feet. Decreased/absent vibratory sensation bilateral feet to 128Hz tuning fork.     Paresthesias, and hyperesthesia bilateral feet with no clearly identified trigger or source.           Psychiatric:         Attention and Perception: He is attentive.         Mood and Affect: Mood is not anxious. Affect is not inappropriate.         Speech: He is communicative. Speech is not slurred.          Behavior: Behavior is not combative.     04/12/23:                  3/22/23        3/15/23:        2/15/23                    2/8/23:  Post hudson: 0.5ccmx0.5cmx0.1cm red granular base right medial foot ulceration.             01/18/23:               01/11/23:        01/04/23:        12/27/22:         12/21/22:          11/30/22 11/16/2022    Some areas of deeper pressure noted but otherwise stable            11/9/22:  S/p drainage left heel bullae formation serous drainage noted.             Assessment:       Encounter Diagnoses   Name Primary?    Type 2 diabetes mellitus with stage 5 chronic kidney disease Yes    Healed ulcer of left foot on examination     Healed ulcer of right foot on examination     History of diabetic ulcer of foot                          Plan:       Catalino was seen today for diabetes mellitus and wound check.    Diagnoses and all orders for this visit:    Type 2 diabetes mellitus with stage 5 chronic kidney disease    Healed ulcer of left foot on examination    Healed ulcer of right foot on examination    History of diabetic ulcer of foot          I counseled the patient on his conditions, their implications and medical management.    Education about the prevention of limb loss.    Discussed wound healing cycle, skin integrity, ways to care for skin.Counseled patient on the effects of biomechanical pressure on healing. He verbalizes understanding that it can increase the chances of delayed healing and this prolonged exposure leads to infection or progression of infection which subsequently can result in loss of limb.    Adequate vitamin supplementation, protein intake, and hydration - discussed with patient    All wounds have remained healed. Skin is still delicate therefore patient must be diligent in avoiding excessive pressure and making sure there is adequate support and padding in shoe gear.     Follow-up: 4-6  weeks but should call [facility] immediately if any signs of infection,  such as fever, chills, sweats, increased redness or pain.    Short-term goals include maintaining good offloading and minimizing bioburden, promoting granulation and epithelialization to healing.  Long-term goals include keeping the wound healed by good offloading and medical management under the direction of internist.    Shoe inspection. Diabetic Foot Education. Patient reminded of the importance of good nutrition and blood sugar control to help prevent podiatric complications of diabetes. Patient instructed on proper foot hygeine. We discussed wearing proper shoe gear, daily foot inspections, never walking without protective shoe gear, never putting sharp instruments to feet.

## 2023-04-24 ENCOUNTER — PATIENT MESSAGE (OUTPATIENT)
Dept: GASTROENTEROLOGY | Facility: CLINIC | Age: 58
End: 2023-04-24
Payer: COMMERCIAL

## 2023-04-25 ENCOUNTER — PATIENT MESSAGE (OUTPATIENT)
Dept: GASTROENTEROLOGY | Facility: CLINIC | Age: 58
End: 2023-04-25
Payer: COMMERCIAL

## 2023-05-15 ENCOUNTER — HOSPITAL ENCOUNTER (OUTPATIENT)
Dept: RADIOLOGY | Facility: HOSPITAL | Age: 58
Discharge: HOME | End: 2023-05-15
Attending: INTERNAL MEDICINE
Payer: COMMERCIAL

## 2023-05-15 DIAGNOSIS — R06.6 INTRACTABLE HICCUPS: ICD-10-CM

## 2023-05-15 PROCEDURE — 78264 GASTRIC EMPTYING IMG STUDY: CPT | Mod: TC

## 2023-05-15 PROCEDURE — 78264 GASTRIC EMPTYING IMG STUDY: CPT | Mod: 26,,, | Performed by: RADIOLOGY

## 2023-05-15 PROCEDURE — 78264 NM GASTRIC EMPTYING: ICD-10-PCS | Mod: 26,,, | Performed by: RADIOLOGY

## 2023-05-16 ENCOUNTER — TELEPHONE (OUTPATIENT)
Dept: GASTROENTEROLOGY | Facility: CLINIC | Age: 58
End: 2023-05-16
Payer: COMMERCIAL

## 2023-05-16 NOTE — TELEPHONE ENCOUNTER
----- Message from Eren Francois MD sent at 5/16/2023  2:19 PM CDT -----  Sounds great.  Thanks.    ----- Message -----  From: Alessandra Lozoya MA  Sent: 5/16/2023   2:10 PM CDT  To: MD Danny Plunkett, Dr. Francois    You don't have any more slots until August.  Do you want to use a 7 day slot for him?    Thank You   ----- Message -----  From: Eren Francois MD  Sent: 5/16/2023  12:07 PM CDT  To: [facility] Clinical Support    Hello,    Can we schedule clinic followup to discuss results?    Thanks,    ----- Message -----  From: Interface, Rad Results In  Sent: 5/15/2023  12:56 PM CDT  To: Eren Francois MD

## 2023-05-16 NOTE — TELEPHONE ENCOUNTER
Dr. Francosi states its ok to use a 7 day hold to schedule patient for a follow up on test results.

## 2023-05-17 ENCOUNTER — PATIENT MESSAGE (OUTPATIENT)
Dept: GASTROENTEROLOGY | Facility: CLINIC | Age: 58
End: 2023-05-17
Payer: COMMERCIAL

## 2023-05-18 ENCOUNTER — TELEPHONE (OUTPATIENT)
Dept: GASTROENTEROLOGY | Facility: CLINIC | Age: 58
End: 2023-05-18
Payer: COMMERCIAL

## 2023-05-18 NOTE — TELEPHONE ENCOUNTER
----- Message from Eren Francois MD sent at 5/16/2023 12:07 PM CDT -----  Hello,    Can we schedule clinic followup to discuss results?    Thanks,    ----- Message -----  From: Interface, Rad Results In  Sent: 5/15/2023  12:56 PM CDT  To: Eren Francois MD

## 2023-05-26 ENCOUNTER — TELEPHONE (OUTPATIENT)
Dept: PULMONOLOGY | Facility: CLINIC | Age: 58
End: 2023-05-26
Payer: COMMERCIAL

## 2023-05-29 PROBLEM — G45.9 TIA (TRANSIENT ISCHEMIC ATTACK): Status: RESOLVED | Noted: 2023-02-22 | Resolved: 2023-05-29

## 2023-06-05 ENCOUNTER — TELEPHONE (OUTPATIENT)
Dept: GASTROENTEROLOGY | Facility: CLINIC | Age: 58
End: 2023-06-05
Payer: COMMERCIAL

## 2023-06-05 NOTE — TELEPHONE ENCOUNTER
Message routed to provider. Patient is available for a virtual appointment on Mondays and Wednesdays.

## 2023-06-07 ENCOUNTER — ANESTHESIA EVENT (OUTPATIENT)
Dept: SURGERY | Facility: HOSPITAL | Age: 58
End: 2023-06-07
Payer: COMMERCIAL

## 2023-06-07 ENCOUNTER — OFFICE VISIT (OUTPATIENT)
Dept: VASCULAR SURGERY | Facility: CLINIC | Age: 58
End: 2023-06-07
Payer: COMMERCIAL

## 2023-06-07 VITALS — WEIGHT: 231.5 LBS | DIASTOLIC BLOOD PRESSURE: 77 MMHG | SYSTOLIC BLOOD PRESSURE: 149 MMHG | BODY MASS INDEX: 30.54 KG/M2

## 2023-06-07 DIAGNOSIS — Z99.2 ESRD ON HEMODIALYSIS: ICD-10-CM

## 2023-06-07 DIAGNOSIS — N18.6 ESRD (END STAGE RENAL DISEASE): Primary | ICD-10-CM

## 2023-06-07 DIAGNOSIS — N18.6 ESRD ON HEMODIALYSIS: ICD-10-CM

## 2023-06-07 PROCEDURE — 3077F SYST BP >= 140 MM HG: CPT | Mod: CPTII,S$GLB,, | Performed by: SURGERY

## 2023-06-07 PROCEDURE — 3044F HG A1C LEVEL LT 7.0%: CPT | Mod: CPTII,S$GLB,, | Performed by: SURGERY

## 2023-06-07 PROCEDURE — 3078F PR MOST RECENT DIASTOLIC BLOOD PRESSURE < 80 MM HG: ICD-10-PCS | Mod: CPTII,S$GLB,, | Performed by: SURGERY

## 2023-06-07 PROCEDURE — 1159F PR MEDICATION LIST DOCUMENTED IN MEDICAL RECORD: ICD-10-PCS | Mod: CPTII,S$GLB,, | Performed by: SURGERY

## 2023-06-07 PROCEDURE — 99999 PR PBB SHADOW E&M-EST. PATIENT-LVL V: ICD-10-PCS | Mod: PBBFAC,,, | Performed by: SURGERY

## 2023-06-07 PROCEDURE — 3078F DIAST BP <80 MM HG: CPT | Mod: CPTII,S$GLB,, | Performed by: SURGERY

## 2023-06-07 PROCEDURE — 99214 OFFICE O/P EST MOD 30 MIN: CPT | Mod: S$GLB,,, | Performed by: SURGERY

## 2023-06-07 PROCEDURE — 3077F PR MOST RECENT SYSTOLIC BLOOD PRESSURE >= 140 MM HG: ICD-10-PCS | Mod: CPTII,S$GLB,, | Performed by: SURGERY

## 2023-06-07 PROCEDURE — 3008F BODY MASS INDEX DOCD: CPT | Mod: CPTII,S$GLB,, | Performed by: SURGERY

## 2023-06-07 PROCEDURE — 3044F PR MOST RECENT HEMOGLOBIN A1C LEVEL <7.0%: ICD-10-PCS | Mod: CPTII,S$GLB,, | Performed by: SURGERY

## 2023-06-07 PROCEDURE — 99999 PR PBB SHADOW E&M-EST. PATIENT-LVL V: CPT | Mod: PBBFAC,,, | Performed by: SURGERY

## 2023-06-07 PROCEDURE — 99214 PR OFFICE/OUTPT VISIT, EST, LEVL IV, 30-39 MIN: ICD-10-PCS | Mod: S$GLB,,, | Performed by: SURGERY

## 2023-06-07 PROCEDURE — 3008F PR BODY MASS INDEX (BMI) DOCUMENTED: ICD-10-PCS | Mod: CPTII,S$GLB,, | Performed by: SURGERY

## 2023-06-07 PROCEDURE — 1159F MED LIST DOCD IN RCRD: CPT | Mod: CPTII,S$GLB,, | Performed by: SURGERY

## 2023-06-07 NOTE — H&P (VIEW-ONLY)
Daniel Poon MD [facility]                         06/07/2023    HPI:  Catalino Mcfadden is a 58 y.o. male with   Patient Active Problem List   Diagnosis    Type 2 diabetes mellitus with stage 5 chronic kidney disease    Essential hypertension    Hyperlipidemia, acquired    ED (erectile dysfunction)    History of diabetic ulcer of foot    Osteomyelitis of second toe of left foot    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    Long term (current) use of antibiotics    Acute osteomyelitis of metatarsal bone of left foot    Diabetic ulcer of toe of left foot    Hyponatremia    Osteomyelitis of left foot    Hypoalbuminemia    Healed ulcer of left foot on examination    Iron deficiency anemia    Metabolic acidosis    Anemia due to chronic kidney disease, on chronic dialysis    Diabetic ulcer of left midfoot associated with type 2 diabetes mellitus, with fat layer exposed    Type II diabetes mellitus with neurological manifestations    Foot ulcer, right, with fat layer exposed    Foot ulceration, left, with fat layer exposed    ESRD on hemodialysis    Malignant renovascular hypertension    Calcified granuloma of lung    Abnormal findings on diagnostic imaging of lung    Tracheobronchomalacia    being managed by PCP and specialists who is in need for long term dialysis access.  Patient is R handed.  Has been on HD for months.  No previous access surgery.  No defibrillator or pacemaker.  No recent UTI.    No extremity pain and swelling.     no MI  no Stroke  yes DM  yes HTN  no Lupus  no nephropathy  Tobacco use: denies    6/2023: Presents for f/u.  No new issues.  L foot wound has healed.    Past Medical History:   Diagnosis Date    Diabetes mellitus, type 2     Diabetic foot ulcer associated with diabetes mellitus due to underlying condition 07/16/2020    ESRD on hemodialysis     Hyperlipidemia      Hypertension     Obese      Past Surgical History:   Procedure Laterality Date    BONE BIOPSY Left 7/17/2020    Procedure: BIOPSY, BONE;  Surgeon: Verona Whitmore DPM;  Location: [facility] OR;  Service: Podiatry;  Laterality: Left;    CERVICAL DISC SURGERY  07/11/2016    DEBRIDEMENT Left 7/17/2020    Procedure: DEBRIDEMENT;  Surgeon: Verona Whitmore DPM;  Location: [facility] OR;  Service: Podiatry;  Laterality: Left;    DEBRIDEMENT OF FOOT Right     toes & plantar surface    ESOPHAGEAL MANOMETRY WITH MEASUREMENT OF IMPEDANCE N/A 3/27/2023    Procedure: MANOMETRY, ESOPHAGUS, WITH IMPEDANCE MEASUREMENT;  Surgeon: Roopa Pérez MD;  Location: [facility] ENDO (4TH FLR);  Service: Endoscopy;  Laterality: N/A;  Belching and rumination protocol please  instructions via portal - sm    ESOPHAGOGASTRODUODENOSCOPY N/A 12/16/2022    Procedure: EGD (ESOPHAGOGASTRODUODENOSCOPY);  Surgeon: Eren Francois MD;  Location: [facility];  Service: Endoscopy;  Laterality: N/A;  Pt. on Dialysis. K+ ordered prior to procedure.EC    FRACTURE SURGERY Right     medial ankle, metal plate present    INSERTION OF TUNNELED CENTRAL VENOUS CATHETER (CVC) WITH SUBCUTANEOUS PORT N/A 6/11/2021    Procedure: TUNNEL CATH INSERTION WITHOUT PORT;  Surgeon: Jose Manuel Diagnostic Provider;  Location: [facility] OR;  Service: Radiology;  Laterality: N/A;  11AM START---PHONE PREOP 6/10/21---COVID POSTIVE ON 7/2020---NO S/S    left leg orthopedic surgery Left      Family History   Problem Relation Age of Onset    Heart disease Father     Colon cancer Neg Hx     Esophageal cancer Neg Hx      Social History     Socioeconomic History    Marital status: Other    Number of children: 1   Tobacco Use    Smoking status: Never    Smokeless tobacco: Never   Substance and Sexual Activity    Alcohol use: Yes     Comment: social    Drug use: No    Sexual activity: Not Currently   Social History Narrative    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     Social Determinants of Health     Financial Resource  Strain: Low Risk     Difficulty of Paying Living Expenses: Not very hard   Food Insecurity: No Food Insecurity    Worried About Running Out of Food in the Last Year: Never true    Ran Out of Food in the Last Year: Never true   Transportation Needs: No Transportation Needs    Lack of Transportation (Medical): No    Lack of Transportation (Non-Medical): No   Physical Activity: Unknown    Days of Exercise per Week: Patient refused    Minutes of Exercise per Session: Patient refused   Stress: Stress Concern Present    Feeling of Stress : To some extent   Social Connections: Moderately Integrated    Frequency of Communication with Friends and Family: Three times a week    Frequency of Social Gatherings with Friends and Family: Three times a week    Attends Anabaptism Services: 1 to 4 times per year    Active Member of Clubs or Organizations: No    Attends Club or Organization Meetings: Never    Marital Status:    Housing Stability: Low Risk     Unable to Pay for Housing in the Last Year: No    Number of Places Lived in the Last Year: 1    Unstable Housing in the Last Year: No       Current Outpatient Medications:     dulaglutide (TRULICITY) 0.75 mg/0.5 mL pen injector, INJECT 0.5 ML UNDER THE SKIN EVERY 7 DAYS, Disp: 12 pen, Rfl: 0    esomeprazole (NEXIUM) 40 MG capsule, Take 1 capsule (40 mg total) by mouth 2 (two) times daily before meals., Disp: 60 capsule, Rfl: 11    gabapentin (NEURONTIN) 100 MG capsule, Take 1 capsule (100 mg total) by mouth 3 (three) times daily., Disp: 90 capsule, Rfl: 11    GI cocktail antac/dicyc/lidoc, Swish and swallow 5 mLs 4 (four) times daily as needed (hiccups)., Disp: 50 mL, Rfl: 2    heparin sod,pork in 0.45% NaCl (HEPARIN, PORCINE, 100 UNITS) 100 unit/100 mL (1 unit/mL) SolP in sodium chloride 0.45 % 100 mL infusion, Heparin Sodium (Porcine) 1,000 Units/mL Systemic, Disp: , Rfl:     imipramine (TOFRANIL) 25 MG tablet, Take 1 tablet (25 mg total) by mouth every evening., Disp: 30  tablet, Rfl: 11    methoxy peg-epoetin beta (MIRCERA INJ), 50 mcg., Disp: , Rfl:     NIFEdipine (PROCARDIA XL) 90 MG (OSM) 24 hr tablet, Take 1 tablet (90 mg total) by mouth once daily., Disp: 90 tablet, Rfl: 3    ondansetron (ZOFRAN-ODT) 4 MG TbDL, Take 1 tablet (4 mg total) by mouth every 6 (six) hours as needed (nausea and vomiting)., Disp: 30 tablet, Rfl: 3    pantoprazole (PROTONIX) 40 MG tablet, Take 1 tablet by mouth., Disp: , Rfl:     promethazine (PHENERGAN) 12.5 MG Tab, Take 1 tablet (12.5 mg total) by mouth every 6 (six) hours as needed (nausea and vomiting)., Disp: 30 tablet, Rfl: 3    sevelamer carbonate (RENVELA) 800 mg Tab, Take 1 tablet (800 mg total) by mouth 3 (three) times daily with meals., Disp: 90 tablet, Rfl: 11    sucroferric oxyhydroxide (VELPHORO) 500 mg Chew, Take 1 tablet by mouth., Disp: , Rfl:     torsemide (DEMADEX) 20 MG Tab, Take 1 tablet (20 mg total) by mouth once daily. Take once a day on non-HD days, Disp: 90 each, Rfl: 3    aspirin 81 MG Chew, Take 1 tablet (81 mg total) by mouth once daily., Disp: 30 tablet, Rfl: 0    atorvastatin (LIPITOR) 40 MG tablet, Take 1 tablet (40 mg total) by mouth once daily., Disp: 30 tablet, Rfl: 0    chlorproMAZINE (THORAZINE) 25 MG tablet, Take 1 tablet (25 mg total) by mouth 3 (three) times daily. for 14 days, Disp: 42 tablet, Rfl: 0    GI cocktail antac/dicyc/lidoc, Swish and swallow 5 mLs 3 (three) times daily as needed (reflux)., Disp: 500 mL, Rfl: 0    hydrALAZINE (APRESOLINE) 100 MG tablet, Take 1 tablet (100 mg total) by mouth 3 (three) times daily., Disp: 90 tablet, Rfl: 0    REVIEW OF SYSTEMS:  General: No fevers or chills; ENT: No sore throat; Allergy and Immunology: no persistent infections; Hematological and Lymphatic: No history of bleeding or easy bruising; Endocrine: negative; Respiratory: no cough, shortness of breath, or wheezing; Cardiovascular: no chest pain or dyspnea on exertion; Gastrointestinal: no abdominal pain/back,  change in bowel habits, or bloody stools; Genito-Urinary: no dysuria, trouble voiding, or hematuria; Musculoskeletal: negative; Neurological: no TIA or stroke symptoms; Psychiatric: no nervousness, anxiety or depression.    PHYSICAL EXAM:                General appearance:  Alert, well-appearing, and in no distress.  Oriented to person, place, and time                    Neurological: Normal speech, no focal findings noted; CN II - XII grossly intact. BUE with sensation to light touch.            Musculoskeletal: Digits/nail without cyanosis/clubbing.  Strength 5/5 BUE.                    Neck: Supple, no significant adenopathy, no carotid bruit can be auscultated                  Chest:  Clear to auscultation, no wheezes, rales or rhonchi, symmetric air entry. No use of accessory muscles               Cardiac: Normal rate and regular rhythm, S1 and S2 normal            Abdomen: Soft, nontender, nondistended, no masses or organomegaly, no hernia     No rebound tenderness noted; bowel sounds normal     Pulsatile aortic mass is non palpable.     No groin adenopathy      Extremities:   2 + R radial pulse, 2 + L radial pulse     2 + R brachial pulse, 2 + L brachial pulse     no RUE edema, no LUE edema     neg Dutch's test RUE, neg Dutch's test LUE    Skin: No tissue loss    LAB RESULTS:  No results found for: CBC  Lab Results   Component Value Date    LABPROT 12.0 08/03/2020    INR 1.1 08/03/2020     Lab Results   Component Value Date     (L) 02/22/2023    K 3.5 02/22/2023    CL 92 (L) 02/22/2023    CO2 27 02/22/2023     02/22/2023    BUN 19 02/22/2023    CREATININE 6.6 (H) 02/22/2023    CALCIUM 8.6 (L) 02/22/2023    ANIONGAP 10 02/22/2023    EGFRNONAA 9.0 (A) 02/23/2022     Lab Results   Component Value Date    WBC 4.72 02/22/2023    RBC 3.71 (L) 02/22/2023    HGB 10.3 (L) 02/22/2023    HCT 31.1 (L) 02/22/2023    MCV 84 02/22/2023    MCH 27.8 02/22/2023    MCHC 33.1 02/22/2023    RDW 13.0 02/22/2023    PLT  129 (L) 02/22/2023    MPV 9.5 02/22/2023    GRAN 3.3 02/22/2023    GRAN 69.2 02/22/2023    LYMPH 0.8 (L) 02/22/2023    LYMPH 16.3 (L) 02/22/2023    MONO 0.5 02/22/2023    MONO 11.2 02/22/2023    EOS 0.1 02/22/2023    BASO 0.04 02/22/2023    EOSINOPHIL 2.1 02/22/2023    BASOPHIL 0.8 02/22/2023    DIFFMETHOD Automated 02/22/2023     .  Lab Results   Component Value Date    HGBA1C 5.1 02/22/2023       IMAGING:  All pertinent imaging has been reviewed and interpreted independently.    Adequate vein BUE 2022    IMP/PLAN:  58 y.o. male with   Patient Active Problem List   Diagnosis    Type 2 diabetes mellitus with stage 5 chronic kidney disease    Essential hypertension    Hyperlipidemia, acquired    ED (erectile dysfunction)    History of diabetic ulcer of foot    Osteomyelitis of second toe of left foot    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    Long term (current) use of antibiotics    Acute osteomyelitis of metatarsal bone of left foot    Diabetic ulcer of toe of left foot    Hyponatremia    Osteomyelitis of left foot    Hypoalbuminemia    Healed ulcer of left foot on examination    Iron deficiency anemia    Metabolic acidosis    Anemia due to chronic kidney disease, on chronic dialysis    Diabetic ulcer of left midfoot associated with type 2 diabetes mellitus, with fat layer exposed    Type II diabetes mellitus with neurological manifestations    Foot ulcer, right, with fat layer exposed    Foot ulceration, left, with fat layer exposed    ESRD on hemodialysis    Malignant renovascular hypertension    Calcified granuloma of lung    Abnormal findings on diagnostic imaging of lung    Tracheobronchomalacia    being managed by PCP and specialists who is here today for evaluation of long term HD access.    -Recommend LUE AVF vs AVG - risks and benefits discussed with patient including nerve damage, loss of hand and hand function, need for  revision or reintervention, poor cosmesis and steal syndrome.  Patient states understanding and desires to proceed with surgery.  -Vein mapping  -Cont renal diet  -To preop   -venous insuff US      I spent 13 minutes evaluating this patient and greater than 50% of the time was spent counseling, coordinator care and discussing the plan of care.  All questions were answered and patient stated understanding with agreement with the above treatment plan.    Daniel Poon MD [facility]  Vascular and Endovascular Surgery

## 2023-06-07 NOTE — PROGRESS NOTES
Daniel Poon MD [facility]                         06/07/2023    HPI:  Catalino Mcfadden is a 58 y.o. male with   Patient Active Problem List   Diagnosis    Type 2 diabetes mellitus with stage 5 chronic kidney disease    Essential hypertension    Hyperlipidemia, acquired    ED (erectile dysfunction)    History of diabetic ulcer of foot    Osteomyelitis of second toe of left foot    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    Long term (current) use of antibiotics    Acute osteomyelitis of metatarsal bone of left foot    Diabetic ulcer of toe of left foot    Hyponatremia    Osteomyelitis of left foot    Hypoalbuminemia    Healed ulcer of left foot on examination    Iron deficiency anemia    Metabolic acidosis    Anemia due to chronic kidney disease, on chronic dialysis    Diabetic ulcer of left midfoot associated with type 2 diabetes mellitus, with fat layer exposed    Type II diabetes mellitus with neurological manifestations    Foot ulcer, right, with fat layer exposed    Foot ulceration, left, with fat layer exposed    ESRD on hemodialysis    Malignant renovascular hypertension    Calcified granuloma of lung    Abnormal findings on diagnostic imaging of lung    Tracheobronchomalacia    being managed by PCP and specialists who is in need for long term dialysis access.  Patient is R handed.  Has been on HD for months.  No previous access surgery.  No defibrillator or pacemaker.  No recent UTI.    No extremity pain and swelling.     no MI  no Stroke  yes DM  yes HTN  no Lupus  no nephropathy  Tobacco use: denies    6/2023: Presents for f/u.  No new issues.  L foot wound has healed.    Past Medical History:   Diagnosis Date    Diabetes mellitus, type 2     Diabetic foot ulcer associated with diabetes mellitus due to underlying condition 07/16/2020    ESRD on hemodialysis     Hyperlipidemia      Hypertension     Obese      Past Surgical History:   Procedure Laterality Date    BONE BIOPSY Left 7/17/2020    Procedure: BIOPSY, BONE;  Surgeon: Verona Whitmore DPM;  Location: [facility] OR;  Service: Podiatry;  Laterality: Left;    CERVICAL DISC SURGERY  07/11/2016    DEBRIDEMENT Left 7/17/2020    Procedure: DEBRIDEMENT;  Surgeon: Verona Whitmore DPM;  Location: [facility] OR;  Service: Podiatry;  Laterality: Left;    DEBRIDEMENT OF FOOT Right     toes & plantar surface    ESOPHAGEAL MANOMETRY WITH MEASUREMENT OF IMPEDANCE N/A 3/27/2023    Procedure: MANOMETRY, ESOPHAGUS, WITH IMPEDANCE MEASUREMENT;  Surgeon: Roopa Pérez MD;  Location: [facility] ENDO (4TH FLR);  Service: Endoscopy;  Laterality: N/A;  Belching and rumination protocol please  instructions via portal - sm    ESOPHAGOGASTRODUODENOSCOPY N/A 12/16/2022    Procedure: EGD (ESOPHAGOGASTRODUODENOSCOPY);  Surgeon: Eren Francois MD;  Location: [facility];  Service: Endoscopy;  Laterality: N/A;  Pt. on Dialysis. K+ ordered prior to procedure.EC    FRACTURE SURGERY Right     medial ankle, metal plate present    INSERTION OF TUNNELED CENTRAL VENOUS CATHETER (CVC) WITH SUBCUTANEOUS PORT N/A 6/11/2021    Procedure: TUNNEL CATH INSERTION WITHOUT PORT;  Surgeon: Jose Manuel Diagnostic Provider;  Location: [facility] OR;  Service: Radiology;  Laterality: N/A;  11AM START---PHONE PREOP 6/10/21---COVID POSTIVE ON 7/2020---NO S/S    left leg orthopedic surgery Left      Family History   Problem Relation Age of Onset    Heart disease Father     Colon cancer Neg Hx     Esophageal cancer Neg Hx      Social History     Socioeconomic History    Marital status: Other    Number of children: 1   Tobacco Use    Smoking status: Never    Smokeless tobacco: Never   Substance and Sexual Activity    Alcohol use: Yes     Comment: social    Drug use: No    Sexual activity: Not Currently   Social History Narrative    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     Social Determinants of Health     Financial Resource  Strain: Low Risk     Difficulty of Paying Living Expenses: Not very hard   Food Insecurity: No Food Insecurity    Worried About Running Out of Food in the Last Year: Never true    Ran Out of Food in the Last Year: Never true   Transportation Needs: No Transportation Needs    Lack of Transportation (Medical): No    Lack of Transportation (Non-Medical): No   Physical Activity: Unknown    Days of Exercise per Week: Patient refused    Minutes of Exercise per Session: Patient refused   Stress: Stress Concern Present    Feeling of Stress : To some extent   Social Connections: Moderately Integrated    Frequency of Communication with Friends and Family: Three times a week    Frequency of Social Gatherings with Friends and Family: Three times a week    Attends Mandaeism Services: 1 to 4 times per year    Active Member of Clubs or Organizations: No    Attends Club or Organization Meetings: Never    Marital Status:    Housing Stability: Low Risk     Unable to Pay for Housing in the Last Year: No    Number of Places Lived in the Last Year: 1    Unstable Housing in the Last Year: No       Current Outpatient Medications:     dulaglutide (TRULICITY) 0.75 mg/0.5 mL pen injector, INJECT 0.5 ML UNDER THE SKIN EVERY 7 DAYS, Disp: 12 pen, Rfl: 0    esomeprazole (NEXIUM) 40 MG capsule, Take 1 capsule (40 mg total) by mouth 2 (two) times daily before meals., Disp: 60 capsule, Rfl: 11    gabapentin (NEURONTIN) 100 MG capsule, Take 1 capsule (100 mg total) by mouth 3 (three) times daily., Disp: 90 capsule, Rfl: 11    GI cocktail antac/dicyc/lidoc, Swish and swallow 5 mLs 4 (four) times daily as needed (hiccups)., Disp: 50 mL, Rfl: 2    heparin sod,pork in 0.45% NaCl (HEPARIN, PORCINE, 100 UNITS) 100 unit/100 mL (1 unit/mL) SolP in sodium chloride 0.45 % 100 mL infusion, Heparin Sodium (Porcine) 1,000 Units/mL Systemic, Disp: , Rfl:     imipramine (TOFRANIL) 25 MG tablet, Take 1 tablet (25 mg total) by mouth every evening., Disp: 30  tablet, Rfl: 11    methoxy peg-epoetin beta (MIRCERA INJ), 50 mcg., Disp: , Rfl:     NIFEdipine (PROCARDIA XL) 90 MG (OSM) 24 hr tablet, Take 1 tablet (90 mg total) by mouth once daily., Disp: 90 tablet, Rfl: 3    ondansetron (ZOFRAN-ODT) 4 MG TbDL, Take 1 tablet (4 mg total) by mouth every 6 (six) hours as needed (nausea and vomiting)., Disp: 30 tablet, Rfl: 3    pantoprazole (PROTONIX) 40 MG tablet, Take 1 tablet by mouth., Disp: , Rfl:     promethazine (PHENERGAN) 12.5 MG Tab, Take 1 tablet (12.5 mg total) by mouth every 6 (six) hours as needed (nausea and vomiting)., Disp: 30 tablet, Rfl: 3    sevelamer carbonate (RENVELA) 800 mg Tab, Take 1 tablet (800 mg total) by mouth 3 (three) times daily with meals., Disp: 90 tablet, Rfl: 11    sucroferric oxyhydroxide (VELPHORO) 500 mg Chew, Take 1 tablet by mouth., Disp: , Rfl:     torsemide (DEMADEX) 20 MG Tab, Take 1 tablet (20 mg total) by mouth once daily. Take once a day on non-HD days, Disp: 90 each, Rfl: 3    aspirin 81 MG Chew, Take 1 tablet (81 mg total) by mouth once daily., Disp: 30 tablet, Rfl: 0    atorvastatin (LIPITOR) 40 MG tablet, Take 1 tablet (40 mg total) by mouth once daily., Disp: 30 tablet, Rfl: 0    chlorproMAZINE (THORAZINE) 25 MG tablet, Take 1 tablet (25 mg total) by mouth 3 (three) times daily. for 14 days, Disp: 42 tablet, Rfl: 0    GI cocktail antac/dicyc/lidoc, Swish and swallow 5 mLs 3 (three) times daily as needed (reflux)., Disp: 500 mL, Rfl: 0    hydrALAZINE (APRESOLINE) 100 MG tablet, Take 1 tablet (100 mg total) by mouth 3 (three) times daily., Disp: 90 tablet, Rfl: 0    REVIEW OF SYSTEMS:  General: No fevers or chills; ENT: No sore throat; Allergy and Immunology: no persistent infections; Hematological and Lymphatic: No history of bleeding or easy bruising; Endocrine: negative; Respiratory: no cough, shortness of breath, or wheezing; Cardiovascular: no chest pain or dyspnea on exertion; Gastrointestinal: no abdominal pain/back,  change in bowel habits, or bloody stools; Genito-Urinary: no dysuria, trouble voiding, or hematuria; Musculoskeletal: negative; Neurological: no TIA or stroke symptoms; Psychiatric: no nervousness, anxiety or depression.    PHYSICAL EXAM:                General appearance:  Alert, well-appearing, and in no distress.  Oriented to person, place, and time                    Neurological: Normal speech, no focal findings noted; CN II - XII grossly intact. BUE with sensation to light touch.            Musculoskeletal: Digits/nail without cyanosis/clubbing.  Strength 5/5 BUE.                    Neck: Supple, no significant adenopathy, no carotid bruit can be auscultated                  Chest:  Clear to auscultation, no wheezes, rales or rhonchi, symmetric air entry. No use of accessory muscles               Cardiac: Normal rate and regular rhythm, S1 and S2 normal            Abdomen: Soft, nontender, nondistended, no masses or organomegaly, no hernia     No rebound tenderness noted; bowel sounds normal     Pulsatile aortic mass is non palpable.     No groin adenopathy      Extremities:   2 + R radial pulse, 2 + L radial pulse     2 + R brachial pulse, 2 + L brachial pulse     no RUE edema, no LUE edema     neg Dutch's test RUE, neg Dutch's test LUE    Skin: No tissue loss    LAB RESULTS:  No results found for: CBC  Lab Results   Component Value Date    LABPROT 12.0 08/03/2020    INR 1.1 08/03/2020     Lab Results   Component Value Date     (L) 02/22/2023    K 3.5 02/22/2023    CL 92 (L) 02/22/2023    CO2 27 02/22/2023     02/22/2023    BUN 19 02/22/2023    CREATININE 6.6 (H) 02/22/2023    CALCIUM 8.6 (L) 02/22/2023    ANIONGAP 10 02/22/2023    EGFRNONAA 9.0 (A) 02/23/2022     Lab Results   Component Value Date    WBC 4.72 02/22/2023    RBC 3.71 (L) 02/22/2023    HGB 10.3 (L) 02/22/2023    HCT 31.1 (L) 02/22/2023    MCV 84 02/22/2023    MCH 27.8 02/22/2023    MCHC 33.1 02/22/2023    RDW 13.0 02/22/2023    PLT  129 (L) 02/22/2023    MPV 9.5 02/22/2023    GRAN 3.3 02/22/2023    GRAN 69.2 02/22/2023    LYMPH 0.8 (L) 02/22/2023    LYMPH 16.3 (L) 02/22/2023    MONO 0.5 02/22/2023    MONO 11.2 02/22/2023    EOS 0.1 02/22/2023    BASO 0.04 02/22/2023    EOSINOPHIL 2.1 02/22/2023    BASOPHIL 0.8 02/22/2023    DIFFMETHOD Automated 02/22/2023     .  Lab Results   Component Value Date    HGBA1C 5.1 02/22/2023       IMAGING:  All pertinent imaging has been reviewed and interpreted independently.    Adequate vein BUE 2022    IMP/PLAN:  58 y.o. male with   Patient Active Problem List   Diagnosis    Type 2 diabetes mellitus with stage 5 chronic kidney disease    Essential hypertension    Hyperlipidemia, acquired    ED (erectile dysfunction)    History of diabetic ulcer of foot    Osteomyelitis of second toe of left foot    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    Long term (current) use of antibiotics    Acute osteomyelitis of metatarsal bone of left foot    Diabetic ulcer of toe of left foot    Hyponatremia    Osteomyelitis of left foot    Hypoalbuminemia    Healed ulcer of left foot on examination    Iron deficiency anemia    Metabolic acidosis    Anemia due to chronic kidney disease, on chronic dialysis    Diabetic ulcer of left midfoot associated with type 2 diabetes mellitus, with fat layer exposed    Type II diabetes mellitus with neurological manifestations    Foot ulcer, right, with fat layer exposed    Foot ulceration, left, with fat layer exposed    ESRD on hemodialysis    Malignant renovascular hypertension    Calcified granuloma of lung    Abnormal findings on diagnostic imaging of lung    Tracheobronchomalacia    being managed by PCP and specialists who is here today for evaluation of long term HD access.    -Recommend LUE AVF vs AVG - risks and benefits discussed with patient including nerve damage, loss of hand and hand function, need for  revision or reintervention, poor cosmesis and steal syndrome.  Patient states understanding and desires to proceed with surgery.  -Vein mapping  -Cont renal diet  -To preop   -venous insuff US      I spent 13 minutes evaluating this patient and greater than 50% of the time was spent counseling, coordinator care and discussing the plan of care.  All questions were answered and patient stated understanding with agreement with the above treatment plan.    Daniel Poon MD [facility]  Vascular and Endovascular Surgery

## 2023-06-12 ENCOUNTER — OFFICE VISIT (OUTPATIENT)
Dept: GASTROENTEROLOGY | Facility: CLINIC | Age: 58
End: 2023-06-12
Payer: COMMERCIAL

## 2023-06-12 DIAGNOSIS — K31.84 GASTROPARESIS: ICD-10-CM

## 2023-06-12 DIAGNOSIS — K22.4 INEFFECTIVE ESOPHAGEAL MOTILITY: Primary | ICD-10-CM

## 2023-06-12 PROCEDURE — 99214 PR OFFICE/OUTPT VISIT, EST, LEVL IV, 30-39 MIN: ICD-10-PCS | Mod: 95,,, | Performed by: INTERNAL MEDICINE

## 2023-06-12 PROCEDURE — 3044F PR MOST RECENT HEMOGLOBIN A1C LEVEL <7.0%: ICD-10-PCS | Mod: CPTII,95,, | Performed by: INTERNAL MEDICINE

## 2023-06-12 PROCEDURE — 99214 OFFICE O/P EST MOD 30 MIN: CPT | Mod: 95,,, | Performed by: INTERNAL MEDICINE

## 2023-06-12 PROCEDURE — 3044F HG A1C LEVEL LT 7.0%: CPT | Mod: CPTII,95,, | Performed by: INTERNAL MEDICINE

## 2023-06-12 RX ORDER — BETHANECHOL CHLORIDE 5 MG/1
5 TABLET ORAL 3 TIMES DAILY
Qty: 90 TABLET | Refills: 11 | Status: SHIPPED | OUTPATIENT
Start: 2023-06-12 | End: 2023-07-12 | Stop reason: SDUPTHER

## 2023-06-12 NOTE — PROGRESS NOTES
[facility]   Clinic Note              Patient Name: Catalino Mcfadden  Age: 58 y.o.  Sex: male  MRN: [id number]    TODAY'S DATE:  6/12/2023  REFERRING PROVIDER:  No ref. provider found     Diagnosis:   1. Ineffective esophageal motility    2. Gastroparesis        HPI:  Catalino Mcfadden is a 58 y.o. male with a history of HTN, HLD, obesity, NIDDM, ESRD on HD who was referred for evaluation and treatment of hiccups.    For review, patient has been seen by various providers at [facility] in the past year, most recently Dr. Yang last month.  At that time it was noted that patient was suffering from chronic belching as well as dysphagia to solids.  Recommendation was made to continue previously prescribed pantoprazole BID and baclofen TID and proceed with EGD.  This was done later that month with results as below; also was seen in ER and given reglan 5mg BID for hiccups.    Today patient notes persistent hiccups, and is hiccuping during the entirety of our interview.  He has taken Protonix, also trialed over-the-counter Prilosec in the past without effect.  He has taken Reglan without effect.  At present gabapentin has not helped.    Interim history:  Since last being seen by myself in January patient was also seen by Dr. Sánchez who recommended beginning a imipramine at bedtime and in addition to previously ordered gastric emptying study proceeding with manometry.  Manometry was done in March with results below    Today, patient notes continued issues with regurgitation.  He has tried imipramine without relief    05/15/23:  Impression:     Abnormal examination with delayed gastric emptying suggestive of gastroparesis.  4 hour gastric retention is 26% (normal less than or equal to 10%.    03/28/23:  Impression:            Ineffective esophageal motility due to his 80%                          fragmented swallows based on the [place]                          Classification of Esophageal Motility Disorders     PRIOR  ENDOSCOPY:  -Colonoscopy: --    -EGD - 12/16/23:  Impression:            - Normal esophagus. Biopsied.                          - A medium amount of food (residue) in the stomach.                          - Normal stomach. Biopsied.                          - Normal examined duodenum.   Recommendation:        - Discharge patient to home.                          - Resume previous diet.                          - Continue present medications.                          - Await pathology results.                          - Return to GI clinic at appointment to be                          scheduled.   Path:  1. Gastric, biopsy:   Gastric mucosa with mild chronic gastritis   No Helicobacter pylori organisms identified on routine stain   2. Distal esophagus, biopsy:   Gastroesophageal junctional mucosa with mild chronic inflammation   No intraepithelial eosinophils identified in the squamous component   Negative for intestinal metaplasia, dysplasia, or malignancy      ROS: Negative other than above      Review of patient's allergies indicates:  No Known Allergies    No outpatient medications have been marked as taking for the 6/12/23 encounter (Appointment) with Eren Francois MD.       Past Medical History:   Diagnosis Date    Diabetes mellitus, type 2     Diabetic foot ulcer associated with diabetes mellitus due to underlying condition 07/16/2020    ESRD on hemodialysis     Hyperlipidemia     Hypertension     Obese        Past Surgical History:   Procedure Laterality Date    BONE BIOPSY Left 7/17/2020    Procedure: BIOPSY, BONE;  Surgeon: Verona Whitmore DPM;  Location: [facility] OR;  Service: Podiatry;  Laterality: Left;    CERVICAL DISC SURGERY  07/11/2016    DEBRIDEMENT Left 7/17/2020    Procedure: DEBRIDEMENT;  Surgeon: Verona Whitmore DPM;  Location: [facility] OR;  Service: Podiatry;  Laterality: Left;    DEBRIDEMENT OF FOOT Right     toes & plantar surface    ESOPHAGEAL MANOMETRY WITH MEASUREMENT OF IMPEDANCE N/A  3/27/2023    Procedure: MANOMETRY, ESOPHAGUS, WITH IMPEDANCE MEASUREMENT;  Surgeon: Roopa Pérez MD;  Location: [facility] ENDO (4TH FLR);  Service: Endoscopy;  Laterality: N/A;  Belching and rumination protocol please  instructions via portal - sm    ESOPHAGOGASTRODUODENOSCOPY N/A 12/16/2022    Procedure: EGD (ESOPHAGOGASTRODUODENOSCOPY);  Surgeon: Eren Francois MD;  Location[facility];  Service: Endoscopy;  Laterality: N/A;  Pt. on Dialysis. K+ ordered prior to procedure.EC    FRACTURE SURGERY Right     medial ankle, metal plate present    INSERTION OF TUNNELED CENTRAL VENOUS CATHETER (CVC) WITH SUBCUTANEOUS PORT N/A 6/11/2021    Procedure: TUNNEL CATH INSERTION WITHOUT PORT;  Surgeon: Dosnadeem Diagnostic Provider;  Location: [facility] OR;  Service: Radiology;  Laterality: N/A;  11AM START---PHONE PREOP 6/10/21---COVID POSTIVE ON 7/2020---NO S/S    left leg orthopedic surgery Left        Family History   Problem Relation Age of Onset    Heart disease Father     Colon cancer Neg Hx     Esophageal cancer Neg Hx        Social History     Socioeconomic History    Marital status: Other    Number of children: 1   Tobacco Use    Smoking status: Never    Smokeless tobacco: Never   Substance and Sexual Activity    Alcohol use: Yes     Comment: social    Drug use: No    Sexual activity: Not Currently   Social History Narrative    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     Social Determinants of Health     Financial Resource Strain: Low Risk     Difficulty of Paying Living Expenses: Not very hard   Food Insecurity: No Food Insecurity    Worried About Running Out of Food in the Last Year: Never true    Ran Out of Food in the Last Year: Never true   Transportation Needs: No Transportation Needs    Lack of Transportation (Medical): No    Lack of Transportation (Non-Medical): No   Physical Activity: Unknown    Days of Exercise per Week: Patient refused    Minutes of Exercise per Session: Patient refused   Stress: Stress Concern Present     Feeling of Stress : To some extent   Social Connections: Moderately Integrated    Frequency of Communication with Friends and Family: Three times a week    Frequency of Social Gatherings with Friends and Family: Three times a week    Attends [employer] Services: 1 to 4 times per year    Active Member of Clubs or Organizations: No    Attends Club or Organization Meetings: Never    Marital Status:    Housing Stability: Low Risk     Unable to Pay for Housing in the Last Year: No    Number of Places Lived in the Last Year: 1    Unstable Housing in the Last Year: No         Vital Signs:  There were no vitals taken for this visit.     General: Awoke and orientedx3, in no acute distress  HEENT: Normocephalic, atruamatic, Moist mucous membranes  CV: Regular rate and rhythm, no JVD  Pulm: Normal inspiratory effort, no audible wheezing  Abdomen: Soft, nontender, nondistended abdomen without rebound or guarding  Extremities: No clubbing, cyanosis or edema  Psych: Normal affect. Good eye contact     Labs:   Lab Results   Component Value Date    CRP 8.5 (H) 07/23/2021     Lab Results   Component Value Date    HEPBSAG Negative 02/23/2022    HEPBCAB Negative 02/23/2022     Lab Results   Component Value Date    TBGOLDPLUS Negative 02/23/2022     Lab Results   Component Value Date    BKDBQZLQ20HC 12 (L) 02/09/2022     Lab Results   Component Value Date    WBC 4.72 02/22/2023    HGB 10.3 (L) 02/22/2023    HCT 31.1 (L) 02/22/2023    MCV 84 02/22/2023     (L) 02/22/2023     Lab Results   Component Value Date    CREATININE 6.6 (H) 02/22/2023    ALBUMIN 2.7 (L) 02/22/2023    BILITOT 0.6 02/22/2023    ALKPHOS 184 (H) 02/22/2023    AST 20 02/22/2023    ALT 12 02/22/2023       Assessment/Plan:  Catalino Mcfadden is a 58 y.o. male with a history of HTN, HLD, obesity, NIDDM, ESRD on HD who was referred for evaluation and treatment of hiccups.    # Ineffective esophageal motility [K22.4]    Initially seen for 6 months of persistent  hiccups/regurgitation despite trials of PPI, Reglan, gabapentin, and various home remedies.  He has since trialed Thorazine, as well as MMP brain without any improvement of his symptoms.  We were able to obtain manometry which was consistent with ineffective esophageal motility.      Today, we discussed the diagnosis of IEM as well as gastroparesis, and options of treatment including anticholinergics.  We reviewed possible adverse reactions, and he would like to proceed.  We will start with a low dose and if he tolerates it titrate up to effect.    -- Begin bethanechol 5mg before meals  -- gastroparesis diet    RTC 3-6 mo    Thank you for involving us in the care of this patient.        Eren Francois MD  Department of Gastroenterology & Hepatology      The patient location is: [place]  The chief complaint leading to consultation is: regurgitation    Visit type: audiovisual    Face to Face time with patient: 10 minutes  30 minutes of total time spent on the encounter, which includes face to face time and non-face to face time preparing to see the patient (eg, review of tests), Obtaining and/or reviewing separately obtained history, Documenting clinical information in the electronic or other health record, Independently interpreting results (not separately reported) and communicating results to the patient/family/caregiver, or Care coordination (not separately reported).     Each patient to whom he or she provides medical services by telemedicine is:  (1) informed of the relationship between the physician and patient and the respective role of any other health care provider with respect to management of the patient; and (2) notified that he or she may decline to receive medical services by telemedicine and may withdraw from such care at any time.

## 2023-06-12 NOTE — PRE-PROCEDURE INSTRUCTIONS
Daniel Poon MD sent to Dionne Deleon, SWATHI; Ashly Nava, RN  He is scheduled for 7/6/23           Previous Messages       ----- Message -----   From: Marylou Shaver MD   Sent: 6/9/2023   9:48 AM CDT   To: Daniel Poon MD     Hi   Yes, ok to hold.   Thanks     ----- Message -----   From: Daniel Poon MD   Sent: 6/7/2023   8:46 AM CDT   To: Marylou Shaver MD     Good morning     You saw this patient recently.  Could you provide cardiac clearance for AVF surgery under regional, and ability to hold his ASA for a block.  Let me know if you need to see him again in clinic.     Thank you     Cesar

## 2023-06-15 ENCOUNTER — HOSPITAL ENCOUNTER (OUTPATIENT)
Dept: RADIOLOGY | Facility: HOSPITAL | Age: 58
Discharge: HOME | End: 2023-06-15
Attending: SURGERY
Payer: COMMERCIAL

## 2023-06-15 DIAGNOSIS — N18.6 ESRD (END STAGE RENAL DISEASE): ICD-10-CM

## 2023-06-15 PROCEDURE — 93970 EXTREMITY STUDY: CPT | Mod: TC

## 2023-06-15 PROCEDURE — 93970 EXTREMITY STUDY: CPT | Mod: 26,,, | Performed by: RADIOLOGY

## 2023-06-15 PROCEDURE — 93970 US VEIN MAPPING, OTHER, BILATERAL: ICD-10-PCS | Mod: 26,,, | Performed by: RADIOLOGY

## 2023-06-20 ENCOUNTER — PATIENT MESSAGE (OUTPATIENT)
Dept: GASTROENTEROLOGY | Facility: CLINIC | Age: 58
End: 2023-06-20
Payer: COMMERCIAL

## 2023-06-21 ENCOUNTER — PATIENT MESSAGE (OUTPATIENT)
Dept: GASTROENTEROLOGY | Facility: CLINIC | Age: 58
End: 2023-06-21
Payer: COMMERCIAL

## 2023-06-21 ENCOUNTER — OFFICE VISIT (OUTPATIENT)
Dept: PODIATRY | Facility: CLINIC | Age: 58
End: 2023-06-21
Payer: COMMERCIAL

## 2023-06-21 VITALS — HEIGHT: 73 IN | WEIGHT: 231.5 LBS | BODY MASS INDEX: 30.68 KG/M2

## 2023-06-21 DIAGNOSIS — N18.5 TYPE 2 DIABETES MELLITUS WITH STAGE 5 CHRONIC KIDNEY DISEASE: Primary | ICD-10-CM

## 2023-06-21 DIAGNOSIS — L90.9 PLANTAR FAT PAD ATROPHY: ICD-10-CM

## 2023-06-21 DIAGNOSIS — L84 PRE-ULCERATIVE CALLUSES: ICD-10-CM

## 2023-06-21 DIAGNOSIS — Z87.2 HEALED ULCER OF LEFT FOOT ON EXAMINATION: ICD-10-CM

## 2023-06-21 DIAGNOSIS — Z87.2 HEALED ULCER OF RIGHT FOOT ON EXAMINATION: ICD-10-CM

## 2023-06-21 DIAGNOSIS — E11.22 TYPE 2 DIABETES MELLITUS WITH STAGE 5 CHRONIC KIDNEY DISEASE: Primary | ICD-10-CM

## 2023-06-21 DIAGNOSIS — Z86.31 HISTORY OF DIABETIC ULCER OF FOOT: ICD-10-CM

## 2023-06-21 DIAGNOSIS — K31.84 GASTROPARESIS: Primary | ICD-10-CM

## 2023-06-21 PROCEDURE — 99999 PR PBB SHADOW E&M-EST. PATIENT-LVL V: CPT | Mod: PBBFAC,,, | Performed by: PODIATRIST

## 2023-06-21 PROCEDURE — 3044F PR MOST RECENT HEMOGLOBIN A1C LEVEL <7.0%: ICD-10-PCS | Mod: CPTII,S$GLB,, | Performed by: PODIATRIST

## 2023-06-21 PROCEDURE — 99999 PR PBB SHADOW E&M-EST. PATIENT-LVL V: ICD-10-PCS | Mod: PBBFAC,,, | Performed by: PODIATRIST

## 2023-06-21 PROCEDURE — 1160F RVW MEDS BY RX/DR IN RCRD: CPT | Mod: CPTII,S$GLB,, | Performed by: PODIATRIST

## 2023-06-21 PROCEDURE — 3008F PR BODY MASS INDEX (BMI) DOCUMENTED: ICD-10-PCS | Mod: CPTII,S$GLB,, | Performed by: PODIATRIST

## 2023-06-21 PROCEDURE — 1159F MED LIST DOCD IN RCRD: CPT | Mod: CPTII,S$GLB,, | Performed by: PODIATRIST

## 2023-06-21 PROCEDURE — 1160F PR REVIEW ALL MEDS BY PRESCRIBER/CLIN PHARMACIST DOCUMENTED: ICD-10-PCS | Mod: CPTII,S$GLB,, | Performed by: PODIATRIST

## 2023-06-21 PROCEDURE — 3044F HG A1C LEVEL LT 7.0%: CPT | Mod: CPTII,S$GLB,, | Performed by: PODIATRIST

## 2023-06-21 PROCEDURE — 99213 OFFICE O/P EST LOW 20 MIN: CPT | Mod: S$GLB,,, | Performed by: PODIATRIST

## 2023-06-21 PROCEDURE — 1159F PR MEDICATION LIST DOCUMENTED IN MEDICAL RECORD: ICD-10-PCS | Mod: CPTII,S$GLB,, | Performed by: PODIATRIST

## 2023-06-21 PROCEDURE — 99213 PR OFFICE/OUTPT VISIT, EST, LEVL III, 20-29 MIN: ICD-10-PCS | Mod: S$GLB,,, | Performed by: PODIATRIST

## 2023-06-21 PROCEDURE — 3008F BODY MASS INDEX DOCD: CPT | Mod: CPTII,S$GLB,, | Performed by: PODIATRIST

## 2023-06-21 NOTE — PATIENT INSTRUCTIONS
Over the counter pain creams: Voltaren Gel, Biofreeze, Bengay, tiger balm, two old goat, lidocaine gel,  Absorbine Veterinary Liniment Gel Topical Analgesic Sore Muscle and Joint Pain Relief  Recommend lotions: eucerin, eucerin for diabetics, aquaphor, A&D ointment, gold bond for diabetics, sween, [place]'s Bees all purpose baby ointment,  urea 40 with aloe or SkinIntegra rapid crack repair (found on amazon.com)  Shoe recommendations: (try 6pm.com, zappos.com , nordstromrack.Giferent, or shoes.com for discounted prices) you can visit varsity shoes in [place], DSW shoes in [place]  or bob rack in the [facility] (there are also several shoe brand outlets in the [facility])  ONLY purchase stability style tennis shoes NO flex, foam, free, yoga mat style shoes  Asics (GT 4000 or gel foundations), new balance stability type shoes (such as the 940 series), saucony (stabil c3),  Valencia (GTS or Beast or transcend), propet, HokaOne (tennis shoe) Jaden (tennis shoes and boots)  Sofft Brand (women) Reginald&Desmond (men), clarks, crocs, aerosoles, naturalizers, SAS, ecco, born, pauline buck, rockports (dress shoes)  Vionic, burkenstocks, fitflops, propet, taos, baretraps (sandals)  HokaOne sandals, crocs, propet (house shoes)    Nail Home remedy:  Vicks Vapor rub or Emuaid to nails for easier manageability    Diabetes: Inspecting Your Feet  Diabetes increases your chances of developing foot problems. So inspect your feet every day. This helps you find small skin irritations before they become serious infections. If you have trouble seeing the bottoms of your feet, use a mirror or ask a family member or friend to help.     Pressure spots on the bottom of the foot are common areas where problems develop.   How to check your feet  Below are tips to help you look for foot problems. Try to check your feet at the same time each day, such as when you get out of bed in the morning:  Check the top of each foot. The tops of toes, back of  the heel, and outer edge of the foot can get a lot of rubbing from poor-fitting shoes.  Check the bottom of each foot. Daily wear and tear often leads to problems at pressure spots.  Check the toes and nails. Fungal infections often occur between toes. Toenail problems can also be a sign of fungal infections or lead to breaks in the skin.  Check your shoes, too. Loose objects inside a shoe can injure the foot. Use your hand to feel inside your shoes for things like james, loose stitching, or rough areas that could irritate your skin.  Warning signs  Look for any color changes in the foot. Redness with streaks can signal a severe infection, which needs immediate medical attention. Tell your doctor right away if you have any of these problems:  Swelling, sometimes with color changes, may be a sign of poor blood flow or infection. Symptoms include tenderness and an increase in the size of your foot.  Warm or hot areas on your feet may be signs of infection. A foot that is cold may not be getting enough blood.  Sensations such as burning, tingling, or pins and needles can be signs of a problem. Also check for areas that may be numb.  Hot spots are caused by friction or pressure. Look for hot spots in areas that get a lot of rubbing. Hot spots can turn into blisters, calluses, or sores.  Cracks and sores are caused by dry or irritated skin. They are a sign that the skin is breaking down, which can lead to infection.  Toenail problems to watch for include nails growing into the skin (ingrown toenail) and causing redness or pain. Thick, yellow, or discolored nails can signal a fungal infection.  Drainage and odor can develop from untreated sores and ulcers. Call your doctor right away if you notice white or yellow drainage, bleeding, or unpleasant odor.   © 4293-1867 The [employer]. [address] [place] [address], [place] [address]. All rights reserved. This information is not intended as a substitute for  professional medical care. Always follow your healthcare professional's instructions.        Step-by-Step:  Inspecting Your Feet (Diabetes)    Date Last Reviewed: 10/1/2016  © 9541-9869 The [employer]. [address] [place] [address], [place] [address]. All rights reserved. This information is not intended as a substitute for professional medical care. Always follow your healthcare professional's instructions.

## 2023-06-21 NOTE — PROGRESS NOTES
Subjective:      Patient ID: Catalino Mcfadden is a 58 y.o. male.    Chief Complaint: Diabetes Mellitus (6/17/22 Dr Lombard) and Nail Care    Catalino is a 58 y.o. male who presents to the clinic for evaluation and treatment of high risk feet. Catalino has a past medical history of Diabetes mellitus, type 2, Diabetic foot ulcer associated with diabetes mellitus due to underlying condition (07/16/2020), ESRD on hemodialysis, Hyperlipidemia, Hypertension, and Obese. Reports new onset left heel blister x several days after a sock rubbed on the left heel. Seen in ED on 11/7/22.     11/16/2022 returns to clinic for follow up of left heel wound.  Seen by my colleague last week, cultures obtained (NGTD) and patient placed on PO abx.  He has kept dressing intact.     11/23/22: F/u left heel ulceration. Presents with calamine coflex left foot.     11/30/22: F/u left heel ulceration. Patient reports going to work this week.     12/14/22: F/u left heel ulceration. Presents in calamine coflex wrap.     12/21/22: F/u left heel ulceration. Presents in calamine coflex wrap.     12/27/22: F/u left heel ulceration. Presents in calamine coflex wrap. No new pedal complaints, continues to work     01/04/23: F/u left heel ulceration. Presents in calamine coflex wrap. No new pedal complaints, continues to work.  Continues to have hiccups      01/11/23: F/u left heel ulceration. Presents in calamine coflex wrap which appears to have padding which shifted medially. No new pedal complaints, continues to work.  Continues to have hiccups    01/18/23: F/u left heel ulceration. Presents in intact calamine coflex wrap. No new pedal complaints.    2/8/23: F/u right foot ulceration. Presents in calamine coflex wrap.     02/15/23: F/u right ulceration. Presents in intact calamine coflex wrap. No new pedal complaints.    3/15/23: F/u right foot ulceration. Presents in calamine coflex wrap.     03/22/23: F/u right ulceration. Presents in intact calamine coflex  wrap. No new pedal complaints.    04/12/23: Returns to clinic for foot inspection following achieving status of healed to bilateral foot wounds.  He has been attempting to care for feet as instructed. 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 Present in NB tennis shoes.  States he became a vegan on 04/04/2023. No new pedal complaints.    06/21/2023 patient returns to clinic for evaluation and treatment of high-risk feet.  He is status post achieving the status of healed to wounds of both feet.  He has been attempting to care the feet as instructed.  He continues to work and attempts to protect the feet in supportive shoes to the best of his ability.  Patient continues to suffer from hiccups.  No new pedal complaints.      Chief Complaint   Patient presents with    Diabetes Mellitus     6/17/22 Dr Lombard    [facility]         Hemoglobin A1C   Date Value Ref Range Status   02/22/2023 5.1 4.0 - 5.6 % Final     Comment:     ADA Screening Guidelines:  5.7-6.4%  Consistent with prediabetes  >or=6.5%  Consistent with diabetes    High levels of fetal hemoglobin interfere with the HbA1C  assay. Heterozygous hemoglobin variants (HbS, HgC, etc)do  not significantly interfere with this assay.   However, presence of multiple variants may affect accuracy.     02/23/2022 5.0 4.0 - 5.6 % Final     Comment:     ADA Screening Guidelines:  5.7-6.4%  Consistent with prediabetes  >or=6.5%  Consistent with diabetes    High levels of fetal hemoglobin interfere with the HbA1C  assay. Heterozygous hemoglobin variants (HbS, HgC, etc)do  not significantly interfere with this assay.   However, presence of multiple variants may affect accuracy.     02/09/2022 5.0 4.0 - 5.6 % Final     Comment:     ADA Screening Guidelines:  5.7-6.4%  Consistent with prediabetes  >or=6.5%  Consistent with diabetes    High levels of fetal hemoglobin interfere with the HbA1C  assay. Heterozygous hemoglobin variants (HbS,  "HgC, etc)do  not significantly interfere with this assay.   However, presence of multiple variants may affect accuracy.         Review of Systems   Constitutional: Negative for chills.   Cardiovascular:  Negative for chest pain and claudication.   Respiratory:  Negative for cough.    Skin:  Positive for color change, dry skin and nail changes.   Musculoskeletal:  Positive for joint pain.   Gastrointestinal:  Negative for nausea.   Neurological:  Positive for paresthesias. Negative for numbness.   Psychiatric/Behavioral:  The patient is not nervous/anxious.          Objective:      Vitals:    06/21/23 0712   Weight: 105 kg (231 lb 7.7 oz)   Height: 6' 1" (1.854 m)   PainSc: 0-No pain         Physical Exam  Vitals and nursing note reviewed.   Constitutional:       General: He is not in acute distress.     Appearance: He is not toxic-appearing or diaphoretic.      Comments: Pt. is well-developed, well-nourished, appears stated age, in no acute distress, alert and oriented x 3. No evidence of depression, anxiety, or agitation. Calm, cooperative, and communicative. Appropriate interactions and affect.   Cardiovascular:      Pulses:           Dorsalis pedis pulses are 2+ on the right side and 2+ on the left side.        Posterior tibial pulses are 1+ on the right side and 1+ on the left side.      Comments: There is decreased digital hair. Skin is atrophic, slightly hyperpigmented  Pulmonary:      Effort: No respiratory distress.   Musculoskeletal:      Right lower leg: Edema present.      Left lower leg: Edema present.      Right ankle: Swelling present. No tenderness. No lateral malleolus, medial malleolus, AITF ligament, CF ligament or posterior TF ligament tenderness.      Right Achilles Tendon: No defects. Hilliard's test negative.      Left ankle: Swelling present. No tenderness. No lateral malleolus, medial malleolus, AITF ligament, CF ligament or posterior TF ligament tenderness.      Left Achilles Tendon: No " defects. Hilliard's test negative.      Right foot: No tenderness or bony tenderness.      Left foot: No tenderness or bony tenderness.      Comments: Decreased stride, station of gait.  apropulsive toe off.  Increased angle and base of gait.    There is equinus deformity bilateral with decreased dorsiflexion at the ankle joint bilateral. No tenderness with compression of heel. Negative tinels sign. Gait analysis reveals excessive pronation through midstance and propulsion with early heel off.     Patient has hammertoes of digits 2-5 bilateral partially reducible     Decreased first MPJ range of motion both weightbearing and nonweightbearing, no crepitus observed the first MP joint, + dorsal flag sign.     Visible and palpable bunion without pain at dorsomedial 1st metatarsal head right and left.  Hallux abducted right and left partially reducible, tracks laterally without being track bound.  No ecchymosis, erythema, edema, or cardinal signs infection or signs of trauma same foot.    Fat pad atrophy to heels and met heads bilateral    Plantarflexed 1st ray RLE   Lymphadenopathy:      Comments: No lymphatic streaking    Negative lymphadenopathy bilateral popliteal fossa and tarsal tunnel.     Skin:     General: Skin is warm and dry.      Coloration: Skin is not pale.      Findings: Lesion (see wound descriptions below) present. No ecchymosis, laceration or rash.      Nails: There is no clubbing.      Comments: Toenails 1-5 bilaterally discolored/yellowed, dystrophic, brittle with subungual debris.   Superficial abrasion left dorsal lateral foot.    Focal hyperkeratotic lesion consisting entirely of hyperkeratotic tissue without open skin, drainage, pus, fluctuance, malodor, or signs of infection:  left plantar foot.       Neurological:      Sensory: Sensory deficit present.      Comments:  [place] 5.07 monofilamant testing is diminished Kp feet. Decreased/absent vibratory sensation bilateral feet to 128Hz  tuning fork.     Paresthesias, and hyperesthesia bilateral feet with no clearly identified trigger or source.           Psychiatric:         Attention and Perception: He is attentive.         Mood and Affect: Mood is not anxious. Affect is not inappropriate.         Speech: He is communicative. Speech is not slurred.         Behavior: Behavior is not combative.     06/21/2023 04/12/23:                  3/22/23        3/15/23:        2/15/23                    2/8/23:  Post hudson: 0.5ccmx0.5cmx0.1cm red granular base right medial foot ulceration.             01/18/23:               01/11/23:        01/04/23:        12/27/22:         12/21/22:          11/30/22 11/16/2022    Some areas of deeper pressure noted but otherwise stable            11/9/22:  S/p drainage left heel bullae formation serous drainage noted.             Assessment:       Encounter Diagnoses   Name Primary?    Type 2 diabetes mellitus with stage 5 chronic kidney disease Yes    Healed ulcer of left foot on examination     Healed ulcer of right foot on examination     History of diabetic ulcer of foot     Plantar fat pad atrophy     Pre-ulcerative calluses                            Plan:       Catalino was seen today for diabetes mellitus and nail care.    Diagnoses and all orders for this visit:    Type 2 diabetes mellitus with stage 5 chronic kidney disease    Healed ulcer of left foot on examination    Healed ulcer of right foot on examination    History of diabetic ulcer of foot    Plantar fat pad atrophy    Pre-ulcerative calluses            I counseled the patient on his conditions, their implications and medical management.    Education about the prevention of limb loss.    Discussed wound healing cycle, skin integrity, ways to care for skin.Counseled patient on the effects of biomechanical pressure on healing. He verbalizes understanding that it can increase the chances of delayed healing and this prolonged exposure  leads to infection or progression of infection which subsequently can result in loss of limb.    Adequate vitamin supplementation, protein intake, and hydration - discussed with patient    All wounds have remained healed. Skin is still delicate therefore patient must be diligent in avoiding excessive pressure and making sure there is adequate support and padding in shoe gear.     Follow-up: 4-6  weeks but should call [facility] immediately if any signs of infection, such as fever, chills, sweats, increased redness or pain.    Short-term goals include maintaining good offloading and minimizing bioburden, promoting granulation and epithelialization to healing.  Long-term goals include keeping the wound healed by good offloading and medical management under the direction of internist.    Shoe inspection. Diabetic Foot Education. Patient reminded of the importance of good nutrition and blood sugar control to help prevent podiatric complications of diabetes. Patient instructed on proper foot hygeine. We discussed wearing proper shoe gear, daily foot inspections, never walking without protective shoe gear, never putting sharp instruments to feet.

## 2023-06-21 NOTE — TELEPHONE ENCOUNTER
Sorry to  hear that you are still struggling with these problems.  I tried to call you around 11:50 this morning and missed you.  Have you begun the bethanechol before meals to try to help with trouble swallowing?    In reviewing your medications, I see that you take Trulicity for diabetes.  We have been seeing more people taking medicines in this class of drugs having stomach problems, so this could be contributing to your abdominal pain.   The food in your stomach when we did your upper endoscopy makes me think this could be the case.  If you are able to talk to your primary care doctor about changing medications, I think it would be worth considering.      Please let me know if you have additional questions or concerns

## 2023-06-22 ENCOUNTER — PATIENT MESSAGE (OUTPATIENT)
Dept: GASTROENTEROLOGY | Facility: CLINIC | Age: 58
End: 2023-06-22
Payer: COMMERCIAL

## 2023-06-28 ENCOUNTER — HOSPITAL ENCOUNTER (OUTPATIENT)
Dept: PREADMISSION TESTING | Facility: HOSPITAL | Age: 58
Discharge: HOME | End: 2023-06-28
Attending: SURGERY
Payer: COMMERCIAL

## 2023-06-28 ENCOUNTER — PATIENT MESSAGE (OUTPATIENT)
Dept: GASTROENTEROLOGY | Facility: CLINIC | Age: 58
End: 2023-06-28
Payer: COMMERCIAL

## 2023-06-28 VITALS — BODY MASS INDEX: 31.14 KG/M2 | HEIGHT: 73 IN | WEIGHT: 235 LBS

## 2023-06-28 DIAGNOSIS — N18.6 ESRD (END STAGE RENAL DISEASE): ICD-10-CM

## 2023-06-28 LAB
ANION GAP SERPL CALC-SCNC: 10 MMOL/L (ref 8–16)
APTT PPP: 31 SEC (ref 21–32)
BASOPHILS # BLD AUTO: 0.05 K/UL (ref 0–0.2)
BASOPHILS NFR BLD: 0.9 % (ref 0–1.9)
BUN SERPL-MCNC: 19 MG/DL (ref 6–20)
CALCIUM SERPL-MCNC: 9.6 MG/DL (ref 8.7–10.5)
CHLORIDE SERPL-SCNC: 96 MMOL/L (ref 95–110)
CO2 SERPL-SCNC: 27 MMOL/L (ref 23–29)
CREAT SERPL-MCNC: 5.3 MG/DL (ref 0.5–1.4)
DIFFERENTIAL METHOD: ABNORMAL
EOSINOPHIL # BLD AUTO: 0.1 K/UL (ref 0–0.5)
EOSINOPHIL NFR BLD: 2 % (ref 0–8)
ERYTHROCYTE [DISTWIDTH] IN BLOOD BY AUTOMATED COUNT: 14.4 % (ref 11.5–14.5)
EST. GFR  (NO RACE VARIABLE): 12 ML/MIN/1.73 M^2
GLUCOSE SERPL-MCNC: 146 MG/DL (ref 70–110)
HCT VFR BLD AUTO: 32.4 % (ref 40–54)
HGB BLD-MCNC: 10.7 G/DL (ref 14–18)
IMM GRANULOCYTES # BLD AUTO: 0.02 K/UL (ref 0–0.04)
IMM GRANULOCYTES NFR BLD AUTO: 0.4 % (ref 0–0.5)
INR PPP: 1.1 (ref 0.8–1.2)
LYMPHOCYTES # BLD AUTO: 0.6 K/UL (ref 1–4.8)
LYMPHOCYTES NFR BLD: 11.7 % (ref 18–48)
MAGNESIUM SERPL-MCNC: 1.9 MG/DL (ref 1.6–2.6)
MCH RBC QN AUTO: 28.2 PG (ref 27–31)
MCHC RBC AUTO-ENTMCNC: 33 G/DL (ref 32–36)
MCV RBC AUTO: 86 FL (ref 82–98)
MONOCYTES # BLD AUTO: 0.7 K/UL (ref 0.3–1)
MONOCYTES NFR BLD: 12.6 % (ref 4–15)
NEUTROPHILS # BLD AUTO: 3.9 K/UL (ref 1.8–7.7)
NEUTROPHILS NFR BLD: 72.4 % (ref 38–73)
NRBC BLD-RTO: 0 /100 WBC
PHOSPHATE SERPL-MCNC: 2.3 MG/DL (ref 2.7–4.5)
PLATELET # BLD AUTO: 157 K/UL (ref 150–450)
PMV BLD AUTO: 8.8 FL (ref 9.2–12.9)
POTASSIUM SERPL-SCNC: 3.1 MMOL/L (ref 3.5–5.1)
PROTHROMBIN TIME: 11.4 SEC (ref 9–12.5)
RBC # BLD AUTO: 3.79 M/UL (ref 4.6–6.2)
SODIUM SERPL-SCNC: 133 MMOL/L (ref 136–145)
WBC # BLD AUTO: 5.38 K/UL (ref 3.9–12.7)

## 2023-06-28 PROCEDURE — 84100 ASSAY OF PHOSPHORUS: CPT | Performed by: SURGERY

## 2023-06-28 PROCEDURE — 85610 PROTHROMBIN TIME: CPT | Performed by: SURGERY

## 2023-06-28 PROCEDURE — 80048 BASIC METABOLIC PNL TOTAL CA: CPT | Performed by: SURGERY

## 2023-06-28 PROCEDURE — 85730 THROMBOPLASTIN TIME PARTIAL: CPT | Performed by: SURGERY

## 2023-06-28 PROCEDURE — 83735 ASSAY OF MAGNESIUM: CPT | Performed by: SURGERY

## 2023-06-28 PROCEDURE — 36415 COLL VENOUS BLD VENIPUNCTURE: CPT | Performed by: SURGERY

## 2023-06-28 PROCEDURE — 85025 COMPLETE CBC W/AUTO DIFF WBC: CPT | Performed by: SURGERY

## 2023-06-28 NOTE — DISCHARGE INSTRUCTIONS
Before 7 AM, enter through the Emergency Entrance..   After 7 AM enter through the Main Entrance.      Your procedure  is scheduled for __7/6/2023________.    Call [phone] between 2pm and 5pm on __7/5/2023_____to find out your arrival time for the day of surgery.    You may have one visitor.  No children allowed.     You will be going to the Same Day Surgery Unit on the 2nd floor of the hospital.    Important instructions:  Do not eat anything after midnight.  You may have plain water, non carbonated.  You may also have Gatorade or Powerade after midnight.    Stop all fluids 2 hours before your surgery.    It is okay to brush your teeth.  Do not have gum, candy or mints.    SEE MEDICATION SHEET.   TAKE MEDICATIONS AS DIRECTED WITH SIPS OF WATER.      Do not take any diabetic medication on the morning of surgery unless instructed to do so by your doctor or pre op nurse.    STOP taking Aspirin, Ibuprofen,  Advil, Motrin, Mobic(meloxicam), Aleve (naproxen), Fish oil, and Vitamin E for at least 7 days before your surgery.     You may take Tylenol if needed which is not a blood thinner.    Please shower the night before and the morning of your surgery.      Use Chlorhexidine soap as instructed by your pre op nurse.   Please place clean linens on your bed the night before surgery. Please wear fresh clean clothing after each shower.    No shaving of procedural area at least 4-5 days before surgery due to increased risk of skin irritation and/or possible infection.    Contact lenses and removable denture work may not be worn during your procedure.    You may wear deodorant only. If you are having breast surgery, do not wear deodorant on the operative side.    Do not wear powder, body lotion, perfume/cologne or make-up.    Do not wear any jewelry or have any metal on your body.    You will be asked to remove any dentures or partials for the procedure.    If you are going home on the same day of surgery, you  must arrange for a family member or a friend to drive you home.  Public transportation is prohibited.  You will not be able to drive home if you were given anesthesia or sedation.    Patients who want to have their Post-op prescriptions filled from our in-house [facility], bring a Credit/Debit Card or cash with you. A co-pay may be required.  The pharmacy closes at 5:30 pm.    Wear loose fitting clothes allowing for bandages.    Please leave money and valuables home.      You may bring your cell phone.    Call the doctor if fever or illness should occur before your surgery.    Call [phone] to contact us here if needed.                            CLOTHES ON DAY OF SURGERY    SHOULDER surgery:  you must have a very oversized shirt.  Very, Very large.  You will probably have a large sling on with your arm strapped to your chest.  You will not be able to put the arm of the operated shoulder into a sleeve.  You can put the arm of the un-operated shoulder into the sleeve, but the shirt will need to be draped over the operated shoulder.       ARM or HAND surgery:  make sure that your sleeves are large and loose enough to pass over large dressings or cast.      BREAST or UNDERARM surgery:  wear a loose, button down shirt so that you can dress without raising your arms over your head.    ABDOMINAL surgery:  wear loose, comfortable clothing.  Nothing tight around the abdomen.  NO JEANS    PENIS or SCROTAL surgery:  loose comfortable clothing.  Large sweat pants, pajama pants or a robe.  ABSOLUTELY NO JEANS      LEG or FOOT surgery:  wear large loose pants that are able to pass over any large dressings or casts.  You could also wear loose shorts or a skirt.

## 2023-06-28 NOTE — TELEPHONE ENCOUNTER
I'm sorry that your symptoms have continued.      Where you able to schedule the MRI of your abdomen that Dr. Pérez requested to further evaluate your symptoms?    Has pulmonology been able to schedule a clinic appointment for you to talk about the findings on your chest imaging?  Finally, did you begin Bethanechol (the drug we talked about in clinic earlier this month to help treat your IEM)?      I would say that this intervention (Bethanechol) and these two evaluations (MRI and pulm consult) are the next best diagnostic and therapeutic steps.    Please let me know your thoughts.  We will work on scheduling a clinic followup soon with one of our GI providers to continue to diagnose and treat whatever is causing your problems,   Essential hypertension, benign    HTN - Hypertension    Hypothyroid

## 2023-06-28 NOTE — ANESTHESIA PREPROCEDURE EVALUATION
06/28/2023  Catalino Mcfadden is a 58 y.o., male scheduled for CREATION, AV FISTULA (Left)  On 7/6/2023.    Past Medical History:   Diagnosis Date    Diabetes mellitus, type 2     Diabetic foot ulcer associated with diabetes mellitus due to underlying condition 07/16/2020    ESRD on hemodialysis     Hyperlipidemia     Hypertension     Obese        Past Surgical History:   Procedure Laterality Date    BONE BIOPSY Left 7/17/2020    Procedure: BIOPSY, BONE;  Surgeon: Verona Whitmore DPM;  Location: [facility] OR;  Service: Podiatry;  Laterality: Left;    CERVICAL DISC SURGERY  07/11/2016    DEBRIDEMENT Left 7/17/2020    Procedure: DEBRIDEMENT;  Surgeon: Verona Whitmore DPM;  Location: [facility] OR;  Service: Podiatry;  Laterality: Left;    DEBRIDEMENT OF FOOT Right     toes & plantar surface    ESOPHAGEAL MANOMETRY WITH MEASUREMENT OF IMPEDANCE N/A 3/27/2023    Procedure: MANOMETRY, ESOPHAGUS, WITH IMPEDANCE MEASUREMENT;  Surgeon: Roopa Pérez MD;  Location: [facility] ([facility] FLR);  Service: Endoscopy;  Laterality: N/A;  Belching and rumination protocol please  instructions via portal - sm    ESOPHAGOGASTRODUODENOSCOPY N/A 12/16/2022    Procedure: EGD (ESOPHAGOGASTRODUODENOSCOPY);  Surgeon: Eren Francois MD;  [facility];  Service: Endoscopy;  Laterality: N/A;  Pt. on Dialysis. K+ ordered prior to procedure.EC    FRACTURE SURGERY Right     medial ankle, metal plate present    INSERTION OF TUNNELED CENTRAL VENOUS CATHETER (CVC) WITH SUBCUTANEOUS PORT N/A 6/11/2021    Procedure: TUNNEL CATH INSERTION WITHOUT PORT;  Surgeon: Jose Manuel Diagnostic Provider;  Location: [facility] OR;  Service: Radiology;  Laterality: N/A;  11AM START---PHONE PREOP 6/10/21---COVID POSTIVE ON 7/2020---NO S/S    left leg orthopedic surgery Left            Pre-op Assessment    I have reviewed the Patient Summary Reports.     I have  reviewed the Nursing Notes. I have reviewed the NPO Status.   I have reviewed the Medications.     Review of Systems  Anesthesia Hx:  No problems with previous Anesthesia  Denies Family Hx of Anesthesia complications.   Denies Personal Hx of Anesthesia complications.   Social:  Non-Smoker, No Alcohol Use    Hematology/Oncology:  Hematology Normal   Oncology Normal     EENT/Dental:EENT/Dental Normal   Cardiovascular:   Exercise tolerance: good Hypertension Denies MI.    Denies NEAL.  Functional Capacity good / => 4 METS    Pulmonary:  Pulmonary Normal    Renal/:   Chronic Renal Disease, ESRD, Dialysis TTHS   Hepatic/GI:   GERD    Musculoskeletal:  Musculoskeletal Normal    Neurological:  Neurology Normal    Endocrine:   Diabetes, type 2    Dermatological:  Skin Normal    Psych:  Psychiatric Normal              Anesthesia Plan  Type of Anesthesia, risks & benefits discussed:    Anesthesia Type: Regional  Intra-op Monitoring Plan: Standard ASA Monitors  Post Op Pain Control Plan: multimodal analgesia  Induction:  IV  Informed Consent: Informed consent signed with the Patient and all parties understand the risks and agree with anesthesia plan.  All questions answered. Patient consented to blood products? Yes  ASA Score: 3    Ready For Surgery From Anesthesia Perspective.     .

## 2023-06-29 ENCOUNTER — PATIENT MESSAGE (OUTPATIENT)
Dept: GASTROENTEROLOGY | Facility: CLINIC | Age: 58
End: 2023-06-29
Payer: COMMERCIAL

## 2023-06-29 NOTE — TELEPHONE ENCOUNTER
MA attempted to contact patient, but he did not answer. A voicemail was left for patient to return a call to our office.

## 2023-07-06 ENCOUNTER — ANESTHESIA (OUTPATIENT)
Dept: SURGERY | Facility: HOSPITAL | Age: 58
End: 2023-07-06
Payer: COMMERCIAL

## 2023-07-06 ENCOUNTER — HOSPITAL ENCOUNTER (OUTPATIENT)
Facility: HOSPITAL | Age: 58
Discharge: HOME | End: 2023-07-06
Attending: SURGERY | Admitting: SURGERY
Payer: COMMERCIAL

## 2023-07-06 VITALS
SYSTOLIC BLOOD PRESSURE: 135 MMHG | RESPIRATION RATE: 16 BRPM | DIASTOLIC BLOOD PRESSURE: 72 MMHG | HEART RATE: 85 BPM | OXYGEN SATURATION: 95 % | TEMPERATURE: 98 F

## 2023-07-06 DIAGNOSIS — N18.6 ESRD (END STAGE RENAL DISEASE): ICD-10-CM

## 2023-07-06 DIAGNOSIS — E11.22 TYPE 2 DIABETES MELLITUS WITH STAGE 5 CHRONIC KIDNEY DISEASE: ICD-10-CM

## 2023-07-06 DIAGNOSIS — Z01.818 PREOPERATIVE TESTING: Primary | ICD-10-CM

## 2023-07-06 DIAGNOSIS — N18.5 TYPE 2 DIABETES MELLITUS WITH STAGE 5 CHRONIC KIDNEY DISEASE: ICD-10-CM

## 2023-07-06 LAB
ALBUMIN SERPL BCP-MCNC: 3.1 G/DL (ref 3.5–5.2)
ALP SERPL-CCNC: 372 U/L (ref 55–135)
ALT SERPL W/O P-5'-P-CCNC: 26 U/L (ref 10–44)
ANION GAP SERPL CALC-SCNC: 10 MMOL/L (ref 8–16)
AST SERPL-CCNC: 32 U/L (ref 10–40)
BASOPHILS # BLD AUTO: 0.05 K/UL (ref 0–0.2)
BASOPHILS NFR BLD: 0.9 % (ref 0–1.9)
BILIRUB SERPL-MCNC: 0.8 MG/DL (ref 0.1–1)
BUN SERPL-MCNC: 15 MG/DL (ref 6–20)
CALCIUM SERPL-MCNC: 9.1 MG/DL (ref 8.7–10.5)
CHLORIDE SERPL-SCNC: 95 MMOL/L (ref 95–110)
CO2 SERPL-SCNC: 26 MMOL/L (ref 23–29)
CREAT SERPL-MCNC: 3.9 MG/DL (ref 0.5–1.4)
DIFFERENTIAL METHOD: ABNORMAL
EOSINOPHIL # BLD AUTO: 0.1 K/UL (ref 0–0.5)
EOSINOPHIL NFR BLD: 2.4 % (ref 0–8)
ERYTHROCYTE [DISTWIDTH] IN BLOOD BY AUTOMATED COUNT: 14.5 % (ref 11.5–14.5)
EST. GFR  (NO RACE VARIABLE): 17 ML/MIN/1.73 M^2
GLUCOSE SERPL-MCNC: 102 MG/DL (ref 70–110)
HCT VFR BLD AUTO: 32.3 % (ref 40–54)
HGB BLD-MCNC: 10.4 G/DL (ref 14–18)
IMM GRANULOCYTES # BLD AUTO: 0.03 K/UL (ref 0–0.04)
IMM GRANULOCYTES NFR BLD AUTO: 0.5 % (ref 0–0.5)
LYMPHOCYTES # BLD AUTO: 0.7 K/UL (ref 1–4.8)
LYMPHOCYTES NFR BLD: 12.1 % (ref 18–48)
MCH RBC QN AUTO: 28.4 PG (ref 27–31)
MCHC RBC AUTO-ENTMCNC: 32.2 G/DL (ref 32–36)
MCV RBC AUTO: 88 FL (ref 82–98)
MONOCYTES # BLD AUTO: 0.7 K/UL (ref 0.3–1)
MONOCYTES NFR BLD: 12.4 % (ref 4–15)
NEUTROPHILS # BLD AUTO: 4.1 K/UL (ref 1.8–7.7)
NEUTROPHILS NFR BLD: 71.7 % (ref 38–73)
NRBC BLD-RTO: 0 /100 WBC
PLATELET # BLD AUTO: 143 K/UL (ref 150–450)
PMV BLD AUTO: 9.5 FL (ref 9.2–12.9)
POCT GLUCOSE: 93 MG/DL (ref 70–110)
POTASSIUM SERPL-SCNC: 3.6 MMOL/L (ref 3.5–5.1)
PROT SERPL-MCNC: 7.4 G/DL (ref 6–8.4)
RBC # BLD AUTO: 3.66 M/UL (ref 4.6–6.2)
SODIUM SERPL-SCNC: 131 MMOL/L (ref 136–145)
WBC # BLD AUTO: 5.72 K/UL (ref 3.9–12.7)

## 2023-07-06 PROCEDURE — D9220A PRA ANESTHESIA: ICD-10-PCS | Mod: CRNA,,, | Performed by: NURSE ANESTHETIST, CERTIFIED REGISTERED

## 2023-07-06 PROCEDURE — 80053 COMPREHEN METABOLIC PANEL: CPT | Performed by: SURGERY

## 2023-07-06 PROCEDURE — 25000003 PHARM REV CODE 250: Performed by: NURSE ANESTHETIST, CERTIFIED REGISTERED

## 2023-07-06 PROCEDURE — 71000015 HC POSTOP RECOV 1ST HR: Performed by: SURGERY

## 2023-07-06 PROCEDURE — 71000016 HC POSTOP RECOV ADDL HR: Performed by: SURGERY

## 2023-07-06 PROCEDURE — 85025 COMPLETE CBC W/AUTO DIFF WBC: CPT | Performed by: SURGERY

## 2023-07-06 PROCEDURE — 63600175 PHARM REV CODE 636 W HCPCS: Performed by: SURGERY

## 2023-07-06 PROCEDURE — D9220A PRA ANESTHESIA: Mod: ANES,,, | Performed by: ANESTHESIOLOGY

## 2023-07-06 PROCEDURE — 37000008 HC ANESTHESIA 1ST 15 MINUTES: Performed by: SURGERY

## 2023-07-06 PROCEDURE — 76942 ECHO GUIDE FOR BIOPSY: CPT | Performed by: ANESTHESIOLOGY

## 2023-07-06 PROCEDURE — 63600175 PHARM REV CODE 636 W HCPCS: Performed by: ANESTHESIOLOGY

## 2023-07-06 PROCEDURE — 36821 AV FUSION DIRECT ANY SITE: CPT | Mod: ,,, | Performed by: SURGERY

## 2023-07-06 PROCEDURE — 36000707: Performed by: SURGERY

## 2023-07-06 PROCEDURE — D9220A PRA ANESTHESIA: Mod: CRNA,,, | Performed by: NURSE ANESTHETIST, CERTIFIED REGISTERED

## 2023-07-06 PROCEDURE — 82962 GLUCOSE BLOOD TEST: CPT | Performed by: SURGERY

## 2023-07-06 PROCEDURE — 63600175 PHARM REV CODE 636 W HCPCS: Performed by: NURSE ANESTHETIST, CERTIFIED REGISTERED

## 2023-07-06 PROCEDURE — D9220A PRA ANESTHESIA: ICD-10-PCS | Mod: ANES,,, | Performed by: ANESTHESIOLOGY

## 2023-07-06 PROCEDURE — 25000003 PHARM REV CODE 250: Performed by: SURGERY

## 2023-07-06 PROCEDURE — 71000033 HC RECOVERY, INTIAL HOUR: Performed by: SURGERY

## 2023-07-06 PROCEDURE — 36415 COLL VENOUS BLD VENIPUNCTURE: CPT | Performed by: SURGERY

## 2023-07-06 PROCEDURE — 37000009 HC ANESTHESIA EA ADD 15 MINS: Performed by: SURGERY

## 2023-07-06 PROCEDURE — 27201423 OPTIME MED/SURG SUP & DEVICES STERILE SUPPLY: Performed by: SURGERY

## 2023-07-06 PROCEDURE — 36821 PR ANASTOMOSIS,AV,ANY SITE: ICD-10-PCS | Mod: ,,, | Performed by: SURGERY

## 2023-07-06 PROCEDURE — 36000706: Performed by: SURGERY

## 2023-07-06 RX ORDER — ROPIVACAINE HYDROCHLORIDE 5 MG/ML
INJECTION, SOLUTION EPIDURAL; INFILTRATION; PERINEURAL
Status: COMPLETED | OUTPATIENT
Start: 2023-07-06 | End: 2023-07-06

## 2023-07-06 RX ORDER — VANCOMYCIN HYDROCHLORIDE 1 G/20ML
INJECTION, POWDER, LYOPHILIZED, FOR SOLUTION INTRAVENOUS
Status: DISCONTINUED | OUTPATIENT
Start: 2023-07-06 | End: 2023-07-06 | Stop reason: HOSPADM

## 2023-07-06 RX ORDER — SODIUM CHLORIDE, SODIUM LACTATE, POTASSIUM CHLORIDE, CALCIUM CHLORIDE 600; 310; 30; 20 MG/100ML; MG/100ML; MG/100ML; MG/100ML
INJECTION, SOLUTION INTRAVENOUS CONTINUOUS
Status: DISCONTINUED | OUTPATIENT
Start: 2023-07-06 | End: 2023-07-06 | Stop reason: HOSPADM

## 2023-07-06 RX ORDER — PROPOFOL 10 MG/ML
INJECTION, EMULSION INTRAVENOUS CONTINUOUS PRN
Status: DISCONTINUED | OUTPATIENT
Start: 2023-07-06 | End: 2023-07-06

## 2023-07-06 RX ORDER — CEFAZOLIN SODIUM 2 G/50ML
2 SOLUTION INTRAVENOUS ONCE
Status: COMPLETED | OUTPATIENT
Start: 2023-07-06 | End: 2023-07-06

## 2023-07-06 RX ORDER — PROTAMINE SULFATE 10 MG/ML
INJECTION, SOLUTION INTRAVENOUS
Status: DISCONTINUED | OUTPATIENT
Start: 2023-07-06 | End: 2023-07-06

## 2023-07-06 RX ORDER — LIDOCAINE HYDROCHLORIDE 10 MG/ML
1 INJECTION, SOLUTION EPIDURAL; INFILTRATION; INTRACAUDAL; PERINEURAL ONCE
Status: DISCONTINUED | OUTPATIENT
Start: 2023-07-06 | End: 2023-07-06 | Stop reason: HOSPADM

## 2023-07-06 RX ORDER — LIDOCAINE HYDROCHLORIDE 10 MG/ML
INJECTION, SOLUTION EPIDURAL; INFILTRATION; INTRACAUDAL; PERINEURAL
Status: DISCONTINUED | OUTPATIENT
Start: 2023-07-06 | End: 2023-07-06 | Stop reason: HOSPADM

## 2023-07-06 RX ORDER — HEPARIN SODIUM 1000 [USP'U]/ML
INJECTION, SOLUTION INTRAVENOUS; SUBCUTANEOUS
Status: DISCONTINUED | OUTPATIENT
Start: 2023-07-06 | End: 2023-07-06 | Stop reason: HOSPADM

## 2023-07-06 RX ORDER — FAMOTIDINE 10 MG/ML
INJECTION INTRAVENOUS
Status: DISCONTINUED | OUTPATIENT
Start: 2023-07-06 | End: 2023-07-06

## 2023-07-06 RX ORDER — SODIUM CHLORIDE 0.9 % (FLUSH) 0.9 %
10 SYRINGE (ML) INJECTION
Status: DISCONTINUED | OUTPATIENT
Start: 2023-07-06 | End: 2023-07-06 | Stop reason: HOSPADM

## 2023-07-06 RX ORDER — FENTANYL CITRATE 50 UG/ML
INJECTION, SOLUTION INTRAMUSCULAR; INTRAVENOUS
Status: DISCONTINUED | OUTPATIENT
Start: 2023-07-06 | End: 2023-07-06

## 2023-07-06 RX ORDER — ACETAMINOPHEN 500 MG
1000 TABLET ORAL
Status: DISCONTINUED | OUTPATIENT
Start: 2023-07-06 | End: 2023-07-06 | Stop reason: HOSPADM

## 2023-07-06 RX ORDER — OXYCODONE AND ACETAMINOPHEN 5; 325 MG/1; MG/1
1 TABLET ORAL EVERY 6 HOURS PRN
Qty: 30 TABLET | Refills: 0 | Status: SHIPPED | OUTPATIENT
Start: 2023-07-06 | End: 2023-07-20

## 2023-07-06 RX ORDER — LIDOCAINE HYDROCHLORIDE 20 MG/ML
INJECTION INTRAVENOUS
Status: DISCONTINUED | OUTPATIENT
Start: 2023-07-06 | End: 2023-07-06

## 2023-07-06 RX ORDER — MIDAZOLAM HYDROCHLORIDE 1 MG/ML
INJECTION INTRAMUSCULAR; INTRAVENOUS
Status: DISCONTINUED | OUTPATIENT
Start: 2023-07-06 | End: 2023-07-06

## 2023-07-06 RX ADMIN — FENTANYL CITRATE 25 MCG: 50 INJECTION, SOLUTION INTRAMUSCULAR; INTRAVENOUS at 09:07

## 2023-07-06 RX ADMIN — FENTANYL CITRATE 25 MCG: 50 INJECTION, SOLUTION INTRAMUSCULAR; INTRAVENOUS at 08:07

## 2023-07-06 RX ADMIN — MIDAZOLAM HYDROCHLORIDE 1 MG: 1 INJECTION, SOLUTION INTRAMUSCULAR; INTRAVENOUS at 07:07

## 2023-07-06 RX ADMIN — CEFAZOLIN SODIUM 2 G: 2 SOLUTION INTRAVENOUS at 08:07

## 2023-07-06 RX ADMIN — LIDOCAINE HYDROCHLORIDE 40 MG: 20 INJECTION, SOLUTION INTRAVENOUS at 08:07

## 2023-07-06 RX ADMIN — PROTAMINE SULFATE 15 MG: 10 INJECTION, SOLUTION INTRAVENOUS at 10:07

## 2023-07-06 RX ADMIN — PROPOFOL 25 MCG/KG/MIN: 10 INJECTION, EMULSION INTRAVENOUS at 08:07

## 2023-07-06 RX ADMIN — SODIUM CHLORIDE: 0.9 INJECTION, SOLUTION INTRAVENOUS at 07:07

## 2023-07-06 RX ADMIN — MIDAZOLAM HYDROCHLORIDE 1 MG: 1 INJECTION, SOLUTION INTRAMUSCULAR; INTRAVENOUS at 08:07

## 2023-07-06 RX ADMIN — HEPARIN SODIUM 5000 UNITS: 1000 INJECTION, SOLUTION INTRAVENOUS; SUBCUTANEOUS at 09:07

## 2023-07-06 RX ADMIN — PROTAMINE SULFATE 5 MG: 10 INJECTION, SOLUTION INTRAVENOUS at 10:07

## 2023-07-06 RX ADMIN — FAMOTIDINE 20 MG: 10 INJECTION, SOLUTION INTRAVENOUS at 08:07

## 2023-07-06 RX ADMIN — ROPIVACAINE HYDROCHLORIDE 20 ML: 5 INJECTION, SOLUTION EPIDURAL; INFILTRATION; PERINEURAL at 08:07

## 2023-07-06 NOTE — OR NURSING
Patient connected to cardiac monitor and O2. VSS.  Dr. Willett at bedside for timeout and to begin block.    0805- Procedure complete, patient tolerated well.  No complicationsVS remained stable.

## 2023-07-06 NOTE — DISCHARGE INSTRUCTIONS
DIET: You may resume your home diet. If nausea is present, increase your diet gradually with fluids and bland  Foods    ACTIVITY LEVEL: You have received sedation or an anesthetic, you may feel sleepy for several hours. Rest until  you are more awake. Gradually resume your normal activities    Medications: Pain medication should be taken only if needed and as directed.     No driving, alcoholic beverages or signing legal documents for next 24 hours or while taking pain  medication.    CALL THE DOCTOR:  For any obvious bleeding (some dried blood over the incision is normal).  Redness, swelling, foul smell around incision or fever over 101.  Shortness of breath, Coughing up Bloody sputum, Pains or Swelling in your Calves .  Persistent pain or nausea not relieved by medication.    If any unusual problems or difficulties occur contact your doctor. If you cannot contact your doctor but  feel your signs and symptoms warrant a physicians attention return to the emergency room.       You have received a type of anesthesia that leaves your arm with numbness and/or limited control.    Keep your arm in the sling while numbness and immobility is present (and longer if instructed by your doctor)    Always pay attention to the location of your arm.  It can slip from the sling and get hit against doorways or furniture.    Be cautious around the stove, hot liquids and cigarettes.  It can be burned and you may not be aware of it.    Do not lay on that arm or rest with it pressed against anything. Pressure injuries can occur.    Take your pain medications as directed as soon as you start to regain feeling of your arm.  It may be harder to get the pain under control if you wait until full sensation has returned.     Fall Prevention    Millions of people fall every year and injure themselves. You may have had anesthesia or sedation which may increase your risk of falling. You may have health issues that put you at an increased risk of  falling.     Here are ways to reduce your risk of falling.    Make your home safe by keeping walkways clear of objects you may trip over.  Use non-slip pads under rugs. Do not use area rugs or small throw rugs.  Use non-slip mats in bathtubs and showers.  Install handrails and lights on staircases.  Do not walk in poorly lit areas.  Do not stand on chairs or wobbly ladders.  Use caution when reaching overhead or looking upward. This position can cause a loss of balance.  Be sure your shoes fit properly, have non-slip bottoms and are in good condition.   Wear shoes both inside and out. Avoid going barefoot or wearing slippers.  Be cautious when going up and down stairs, curbs, and when walking on uneven sidewalks.  If your balance is poor, consider using a cane or walker.  If your fall was related to alcohol use, stop or limit alcohol intake.   If your fall was related to use of sleeping medicines, talk to your doctor about this. You may need to reduce your dosage at bedtime if you awaken during the night to go to the bathroom.    To reduce the need for nighttime bathroom trips:  Avoid drinking fluids for several hours before going to bed  Empty your bladder before going to bed  Men can keep a urinal at the bedside  Stay as active as you can. Balance, flexibility, strength, and endurance all come from exercise. They all play a role in preventing falls. Ask your healthcare provider which types of activity are right for you.  Get your vision checked on a regular basis.  If you have pets, know where they are before you stand up or walk so you don't trip over them.  Use night lights.

## 2023-07-06 NOTE — OP NOTE
Arteriovenous Fistula Procedure Note    7/6/2023      Indications: Catalino Mcfadden is a 58 y.o. male  With end stage renal disease and need for long-term dialysis access.    Pre-operative Diagnosis:  ESRD.    Post-operative Diagnosis: same.    Operation: Creation of left upper extremity AV fistula, Brachio-Cephlic.          Surgeon: Daniel Poon MD    Assistants: ANGELIKA Rodriguez    Anesthesia: IV regional    ASA Class: 4    Findings:   Adequate vein for AVF. Vein at wrist not suitable for [place]    Estimate Blood Loss:  <10mL           Specimens: None           Complications:  None; patient tolerated the procedure well.           Disposition: PACU - hemodynamically stable.           Condition: stable    Attending Attestation: I was present and scrubbed for the entire procedure.    Procedure Details   The patient was seen in the Holding Room. The risks, benefits, complications, treatment options, and expected outcomes were discussed with the patient. The patient concurred with the proposed plan, giving informed consent.  The site of surgery properly noted/marked. A Time Out was held and the above information confirmed.    The patient was brought to the Operating Room. The  arm prepped and draped in a sterile fashion. A transverse incision was made just distal to the left antecubital crease. The cephalic vein was identified crossing obliquely. It was dissected proximally and distally and vessel loops were placed around it. The brachial artery was then dissected and proximal and distal control were obtained by placing vessel loops around it.  After flushing the vessels with heparin, the vessel loops were clamped and a end-to-side anastomosis between the cephalic vein and the brachial artery was created using 6-0 Prolene sutures in a continuous running manner. After completion of the anastomosis, the vessel loops were released. Hemostasis was secured. Good thrill was noted in the fistula and the incision was then  closed in two layers, subcutaneous tissue with 3-0 Vicryl and skin with 4-0 Monocryl. Steri-Strips were applied.    Daniel Poon MD  Vascular & Endovascular Surgery

## 2023-07-06 NOTE — ANESTHESIA PROCEDURE NOTES
Peripheral Block    Patient location during procedure: pre-op    Reason for block: primary anesthetic    Diagnosis: ESRD   Start time: 7/6/2023 7:55 AM  Timeout: 7/6/2023 7:53 AM   End time: 7/6/2023 8:04 AM    Staffing  Authorizing Provider: Bushra Willett MD  Performing Provider: Bushra Willett MD    Preanesthetic Checklist  Completed: patient identified, IV checked, site marked, risks and benefits discussed, surgical consent, monitors and equipment checked, pre-op evaluation and timeout performed  Peripheral Block  Patient position: supine  Prep: ChloraPrep  Patient monitoring: heart rate, cardiac monitor, continuous pulse ox, continuous capnometry and frequent blood pressure checks  Block type: supraclavicular  Laterality: left  Injection technique: single shot  Needle  Needle type: Stimuplex   Needle gauge: 22 G  Needle length: 2 in  Needle localization: anatomical landmarks and ultrasound guidance   -ultrasound image captured on disc.  Assessment  Injection assessment: negative aspiration, negative parasthesia and local visualized surrounding nerve  Paresthesia pain: none  Heart rate change: no  Slow fractionated injection: yes  Pain Tolerance: comfortable throughout block and no complaints  Medications:    Medications: ropivacaine (NAROPIN) injection 0.5% - Perineural   20 mL - 7/6/2023 8:06:00 AM    Additional Notes  VSS.  [facility] RN monitoring vitals throughout procedure.  Patient tolerated procedure well.

## 2023-07-06 NOTE — TRANSFER OF CARE
Anesthesia Transfer of Care Note    Patient: Catalino Mcfadden    Procedure(s) Performed: Procedure(s) (LRB):  CREATION, AV FISTULA (Left)    Patient location: PACU    Anesthesia Type: MAC and regional    Transport from OR: Transported from OR on room air with adequate spontaneous ventilation    Post pain: adequate analgesia    Post assessment: no apparent anesthetic complications and tolerated procedure well    Post vital signs: stable    Level of consciousness: awake, alert and oriented    Nausea/Vomiting: no nausea/vomiting    Complications: none    Transfer of care protocol was followed      Last vitals:   Visit Vitals  BP (!) 142/73 (BP Location: Right arm, Patient Position: Lying)   Pulse 87   Temp 37.1 °C (98.7 °F) (Temporal)   Resp 16   SpO2 96%

## 2023-07-06 NOTE — ANESTHESIA POSTPROCEDURE EVALUATION
Anesthesia Post Evaluation    Patient: Catalino Mcfadden    Procedure(s) Performed: Procedure(s) (LRB):  CREATION, AV FISTULA (Left)    Final Anesthesia Type: regional      Patient location during evaluation: PACU  Patient participation: Yes- Able to Participate  Level of consciousness: awake and alert, oriented and awake  Post-procedure vital signs: reviewed and stable  Airway patency: patent    PONV status at discharge: No PONV  Anesthetic complications: no      Cardiovascular status: blood pressure returned to baseline  Respiratory status: unassisted, spontaneous ventilation and room air  Hydration status: euvolemic  Follow-up not needed.          Vitals Value Taken Time   /72 07/06/23 1226   Temp 36.9 °C (98.4 °F) 07/06/23 1226   Pulse 85 07/06/23 1226   Resp 16 07/06/23 1226   SpO2 95 % 07/06/23 1226         Event Time   Out of Recovery 12:22:00         Pain/Madiha Score: Madiha Score: 10 (7/6/2023 12:00 PM)

## 2023-07-06 NOTE — BRIEF OP NOTE
[facility] - Surgery  Brief Operative Note    Surgery Date: 7/6/2023     Surgeon(s) and Role:     * Daniel Poon MD - Primary    Assisting Surgeon: None    Pre-op Diagnosis:  ESRD (end stage renal disease) [N18.6]    Post-op Diagnosis:  Post-Op Diagnosis Codes:     * ESRD (end stage renal disease) [N18.6]    Procedure(s) (LRB):  CREATION, AV FISTULA (Left)    Anesthesia: Regional    Operative Findings: adequate artery and vein for AVF    Estimated Blood Loss: * No values recorded between 7/6/2023  8:39 AM and 7/6/2023 11:04 AM *         Specimens:   Specimen (24h ago, onward)      None              Discharge Note    OUTCOME: Patient tolerated treatment/procedure well without complication and is now ready for discharge.    DISPOSITION: Home or Self Care    FINAL DIAGNOSIS:  <principal problem not specified>    FOLLOWUP: In clinic    DISCHARGE INSTRUCTIONS:    Discharge Procedure Orders   Remove dressing in 48 hours     CV US Hemodialysis Access   Standing Status: Future Standing Exp. Date: 07/06/24     Order Specific Question Answer Comments   Release to patient Immediate      Activity as tolerated

## 2023-07-12 ENCOUNTER — OFFICE VISIT (OUTPATIENT)
Dept: GASTROENTEROLOGY | Facility: CLINIC | Age: 58
End: 2023-07-12
Payer: COMMERCIAL

## 2023-07-12 VITALS
HEIGHT: 74 IN | WEIGHT: 229.75 LBS | SYSTOLIC BLOOD PRESSURE: 166 MMHG | HEART RATE: 94 BPM | DIASTOLIC BLOOD PRESSURE: 81 MMHG | BODY MASS INDEX: 29.48 KG/M2

## 2023-07-12 DIAGNOSIS — K22.4 INEFFECTIVE ESOPHAGEAL MOTILITY: Primary | ICD-10-CM

## 2023-07-12 DIAGNOSIS — K59.00 CONSTIPATION, UNSPECIFIED CONSTIPATION TYPE: ICD-10-CM

## 2023-07-12 DIAGNOSIS — K31.84 GASTROPARESIS: ICD-10-CM

## 2023-07-12 PROCEDURE — 3008F PR BODY MASS INDEX (BMI) DOCUMENTED: ICD-10-PCS | Mod: CPTII,S$GLB,, | Performed by: INTERNAL MEDICINE

## 2023-07-12 PROCEDURE — 1159F MED LIST DOCD IN RCRD: CPT | Mod: CPTII,S$GLB,, | Performed by: INTERNAL MEDICINE

## 2023-07-12 PROCEDURE — 99999 PR PBB SHADOW E&M-EST. PATIENT-LVL III: ICD-10-PCS | Mod: PBBFAC,,, | Performed by: INTERNAL MEDICINE

## 2023-07-12 PROCEDURE — 3044F HG A1C LEVEL LT 7.0%: CPT | Mod: CPTII,S$GLB,, | Performed by: INTERNAL MEDICINE

## 2023-07-12 PROCEDURE — 3044F PR MOST RECENT HEMOGLOBIN A1C LEVEL <7.0%: ICD-10-PCS | Mod: CPTII,S$GLB,, | Performed by: INTERNAL MEDICINE

## 2023-07-12 PROCEDURE — 99999 PR PBB SHADOW E&M-EST. PATIENT-LVL III: CPT | Mod: PBBFAC,,, | Performed by: INTERNAL MEDICINE

## 2023-07-12 PROCEDURE — 3079F PR MOST RECENT DIASTOLIC BLOOD PRESSURE 80-89 MM HG: ICD-10-PCS | Mod: CPTII,S$GLB,, | Performed by: INTERNAL MEDICINE

## 2023-07-12 PROCEDURE — 3008F BODY MASS INDEX DOCD: CPT | Mod: CPTII,S$GLB,, | Performed by: INTERNAL MEDICINE

## 2023-07-12 PROCEDURE — 99214 OFFICE O/P EST MOD 30 MIN: CPT | Mod: S$GLB,,, | Performed by: INTERNAL MEDICINE

## 2023-07-12 PROCEDURE — 99214 PR OFFICE/OUTPT VISIT, EST, LEVL IV, 30-39 MIN: ICD-10-PCS | Mod: S$GLB,,, | Performed by: INTERNAL MEDICINE

## 2023-07-12 PROCEDURE — 3077F SYST BP >= 140 MM HG: CPT | Mod: CPTII,S$GLB,, | Performed by: INTERNAL MEDICINE

## 2023-07-12 PROCEDURE — 1159F PR MEDICATION LIST DOCUMENTED IN MEDICAL RECORD: ICD-10-PCS | Mod: CPTII,S$GLB,, | Performed by: INTERNAL MEDICINE

## 2023-07-12 PROCEDURE — 3077F PR MOST RECENT SYSTOLIC BLOOD PRESSURE >= 140 MM HG: ICD-10-PCS | Mod: CPTII,S$GLB,, | Performed by: INTERNAL MEDICINE

## 2023-07-12 PROCEDURE — 3079F DIAST BP 80-89 MM HG: CPT | Mod: CPTII,S$GLB,, | Performed by: INTERNAL MEDICINE

## 2023-07-12 RX ORDER — BETHANECHOL CHLORIDE 10 MG/1
10 TABLET ORAL 3 TIMES DAILY
Qty: 90 TABLET | Refills: 11 | Status: SHIPPED | OUTPATIENT
Start: 2023-07-12 | End: 2023-08-28

## 2023-07-12 NOTE — PROGRESS NOTES
[facility]   Clinic Note              Patient Name: Catalino Mcfadden  Age: 58 y.o.  Sex: male  MRN: [id number]    TODAY'S DATE:  7/12/2023  REFERRING PROVIDER:  No ref. provider found     Diagnosis:   No diagnosis found.      HPI:  Catalino Mcfadden is a 58 y.o. male with a history of HTN, HLD, obesity, NIDDM, ESRD on HD who was referred for evaluation and treatment of hiccups.    For review, patient has been seen by various providers at [facility] in the past year, most recently Dr. Yang last month.  At that time it was noted that patient was suffering from chronic belching as well as dysphagia to solids.  Recommendation was made to continue previously prescribed pantoprazole BID and baclofen TID and proceed with EGD.  This was done later that month with results as below; also was seen in ER and given reglan 5mg BID for hiccups.    Today patient notes persistent hiccups, and is hiccuping during the entirety of our interview.  He has taken Protonix, also trialed over-the-counter Prilosec in the past without effect.  He has taken Reglan without effect.  At present gabapentin has not helped.    Interim history:  Since last being seen, regurgitation has continued, but is sometimes better.  He notes because of night work schedule having an atypical meal schedule, so hard to take baclofen with meals.      Has not been on trulicity for over a year, not on any DM mediations.  Not taking reglan.    Tried diaphragmatic breathing for about a month without any result.  Also notes mild constipation, sometimes going 2-3 days without Bms.    05/15/23:  Impression:     Abnormal examination with delayed gastric emptying suggestive of gastroparesis.  4 hour gastric retention is 26% (normal less than or equal to 10%.    03/28/23:  Impression:            Ineffective esophageal motility due to his 80%                          fragmented swallows based on the [place]                          Classification of Esophageal Motility  Disorders     PRIOR ENDOSCOPY:  -Colonoscopy: --    -EGD - 12/16/23:  Impression:            - Normal esophagus. Biopsied.                          - A medium amount of food (residue) in the stomach.                          - Normal stomach. Biopsied.                          - Normal examined duodenum.   Recommendation:        - Discharge patient to home.                          - Resume previous diet.                          - Continue present medications.                          - Await pathology results.                          - Return to GI clinic at appointment to be                          scheduled.   Path:  1. Gastric, biopsy:   Gastric mucosa with mild chronic gastritis   No Helicobacter pylori organisms identified on routine stain   2. Distal esophagus, biopsy:   Gastroesophageal junctional mucosa with mild chronic inflammation   No intraepithelial eosinophils identified in the squamous component   Negative for intestinal metaplasia, dysplasia, or malignancy      ROS: Negative other than above      Review of patient's allergies indicates:  No Known Allergies    Outpatient Medications Marked as Taking for the 7/12/23 encounter (Office Visit) with Eren Francois MD   Medication Sig Dispense Refill    bethanechol (URECHOLINE) 5 MG Tab Take 1 tablet (5 mg total) by mouth 3 (three) times daily. 90 tablet 11    dulaglutide (TRULICITY) 0.75 mg/0.5 mL pen injector INJECT 0.5 ML UNDER THE SKIN EVERY 7 DAYS 12 pen 0    esomeprazole (NEXIUM) 40 MG capsule Take 1 capsule (40 mg total) by mouth 2 (two) times daily before meals. 60 capsule 11    gabapentin (NEURONTIN) 100 MG capsule Take 1 capsule (100 mg total) by mouth 3 (three) times daily. 90 capsule 11    heparin sod,pork in 0.45% NaCl (HEPARIN, PORCINE, 100 UNITS) 100 unit/100 mL (1 unit/mL) SolP in sodium chloride 0.45 % 100 mL infusion Heparin Sodium (Porcine) 1,000 Units/mL Systemic      NIFEdipine (PROCARDIA XL) 90 MG (OSM) 24 hr tablet Take 1  tablet (90 mg total) by mouth once daily. 90 tablet 3    ondansetron (ZOFRAN-ODT) 4 MG TbDL Take 1 tablet (4 mg total) by mouth every 6 (six) hours as needed (nausea and vomiting). 30 tablet 3    oxyCODONE-acetaminophen (PERCOCET) 5-325 mg per tablet Take 1 tablet by mouth every 6 (six) hours as needed. 30 tablet 0    pantoprazole (PROTONIX) 40 MG tablet Take 1 tablet by mouth.      promethazine (PHENERGAN) 12.5 MG Tab Take 1 tablet (12.5 mg total) by mouth every 6 (six) hours as needed (nausea and vomiting). 30 tablet 3    sucroferric oxyhydroxide (VELPHORO) 500 mg Chew Take 1 tablet by mouth.      torsemide (DEMADEX) 20 MG Tab Take 1 tablet (20 mg total) by mouth once daily. Take once a day on non-HD days 90 tablet 3       Past Medical History:   Diagnosis Date    Diabetes mellitus, type 2     Diabetic foot ulcer associated with diabetes mellitus due to underlying condition 07/16/2020    ESRD on hemodialysis     Hyperlipidemia     Hypertension     Obese        Past Surgical History:   Procedure Laterality Date    AV FISTULA PLACEMENT Left 7/6/2023    Procedure: CREATION, AV FISTULA;  Surgeon: Daniel Poon MD;  Location: [facility] OR;  Service: Vascular;  Laterality: Left;  RN PREOP 6/28/2023  LABS DAY OF SURGERY    BONE BIOPSY Left 7/17/2020    Procedure: BIOPSY, BONE;  Surgeon: Verona Whitmore DPM;  Location: [facility] OR;  Service: Podiatry;  Laterality: Left;    CERVICAL DISC SURGERY  07/11/2016    DEBRIDEMENT Left 7/17/2020    Procedure: DEBRIDEMENT;  Surgeon: Verona Whitmore DPM;  Location: [facility] OR;  Service: Podiatry;  Laterality: Left;    DEBRIDEMENT OF FOOT Right     toes & plantar surface    ESOPHAGEAL MANOMETRY WITH MEASUREMENT OF IMPEDANCE N/A 3/27/2023    Procedure: MANOMETRY, ESOPHAGUS, WITH IMPEDANCE MEASUREMENT;  Surgeon: Roopa Pérez MD;  Location: [facility] ENDO [address]);  Service: Endoscopy;  Laterality: N/A;  Belching and rumination protocol please  instructions via portal -      ESOPHAGOGASTRODUODENOSCOPY N/A 12/16/2022    Procedure: EGD (ESOPHAGOGASTRODUODENOSCOPY);  Surgeon: Eren Francois MD;  Location: [facility] ENDO;  Service: Endoscopy;  Laterality: N/A;  Pt. on Dialysis. K+ ordered prior to procedure.EC    FRACTURE SURGERY Right     medial ankle, metal plate present    INSERTION OF TUNNELED CENTRAL VENOUS CATHETER (CVC) WITH SUBCUTANEOUS PORT N/A 6/11/2021    Procedure: TUNNEL CATH INSERTION WITHOUT PORT;  Surgeon: Jose Manuel Diagnostic Provider;  Location: [facility] OR;  Service: Radiology;  Laterality: N/A;  11AM START---PHONE PREOP 6/10/21---COVID POSTIVE ON 7/2020---NO S/S    left leg orthopedic surgery Left        Family History   Problem Relation Age of Onset    Heart disease Father     Colon cancer Neg Hx     Esophageal cancer Neg Hx        Social History     Socioeconomic History    Marital status: Other    Number of children: 1   Tobacco Use    Smoking status: Never    Smokeless tobacco: Never   Substance and Sexual Activity    Alcohol use: Yes     Comment: social    Drug use: No    Sexual activity: Not Currently   Social History Narrative    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     Social Determinants of Health     Financial Resource Strain: Low Risk     Difficulty of Paying Living Expenses: Not very hard   Food Insecurity: No Food Insecurity    Worried About Running Out of Food in the Last Year: Never true    Ran Out of Food in the Last Year: Never true   Transportation Needs: No Transportation Needs    Lack of Transportation (Medical): No    Lack of Transportation (Non-Medical): No   Physical Activity: Unknown    Days of Exercise per Week: Patient refused    Minutes of Exercise per Session: Patient refused   Stress: Stress Concern Present    Feeling of Stress : To some extent   Social Connections: Moderately Integrated    Frequency of Communication with Friends and Family: Three times a week    Frequency of Social Gatherings with Friends and Family: Three times a week    Attends [employer]  "Services: 1 to 4 times per year    Active Member of Clubs or Organizations: No    Attends Club or Organization Meetings: Never    Marital Status:    Housing Stability: Low Risk     Unable to Pay for Housing in the Last Year: No    Number of Places Lived in the Last Year: 1    Unstable Housing in the Last Year: No         Vital Signs:  BP (!) 166/81   Pulse 94   Ht 6' 1.5" (1.867 m)   Wt 104.2 kg (229 lb 11.5 oz)   BMI 29.90 kg/m²      General: Awoke and orientedx3, in no acute distress  HEENT: Normocephalic, atruamatic, Moist mucous membranes  CV: Regular rate and rhythm, no JVD  Pulm: Normal inspiratory effort, no audible wheezing  Abdomen: Soft, nontender, nondistended abdomen without rebound or guarding  Extremities: No clubbing, cyanosis or edema  Psych: Normal affect. Good eye contact     Labs:   Lab Results   Component Value Date    CRP 8.5 (H) 07/23/2021     Lab Results   Component Value Date    HEPBSAG Negative 02/23/2022    HEPBCAB Negative 02/23/2022     Lab Results   Component Value Date    TBGOLDPLUS Negative 02/23/2022     Lab Results   Component Value Date    QPUYGXEH96GQ 12 (L) 02/09/2022     Lab Results   Component Value Date    WBC 5.72 07/06/2023    HGB 10.4 (L) 07/06/2023    HCT 32.3 (L) 07/06/2023    MCV 88 07/06/2023     (L) 07/06/2023     Lab Results   Component Value Date    CREATININE 3.9 (H) 07/06/2023    ALBUMIN 3.1 (L) 07/06/2023    BILITOT 0.8 07/06/2023    ALKPHOS 372 (H) 07/06/2023    AST 32 07/06/2023    ALT 26 07/06/2023       Assessment/Plan:  Catalino Mcfadden is a 58 y.o. male with a history of HTN, HLD, obesity, NIDDM, ESRD on HD who was referred for evaluation and treatment of hiccups.    # No primary diagnosis found.    Initially seen for 6 months of persistent hiccups/regurgitation despite trials of PPI, Reglan, gabapentin, and various home remedies.  He has since trialed Thorazine, as well as MMP brain without any improvement of his symptoms.  We were able to " obtain manometry which was consistent with ineffective esophageal motility.      Today, we revisted the diagnosis of IEM as well as gastroparesis, and options of treatment including anticholinergics.  With concomitant gastroparesis and some constipation, Motegrity would be an option if not 4 kidney disease, but ESRD would be contraindication Motegrity.  We will attempt to dose optimize his bethanechol as such:    -- Increase bethanechol from 5mg to 10mg before meals; low threshold to continue to dose optimize up to 50mg before meals  -- gastroparesis diet  -- Diaphragmatic breathing  -- discussed motegrity but unclear that we can use with HD    RTC 3-6 mo    Thank you for involving us in the care of this patient.        Eren Francois MD  Department of Gastroenterology & Hepatology

## 2023-07-19 ENCOUNTER — OFFICE VISIT (OUTPATIENT)
Dept: PODIATRY | Facility: CLINIC | Age: 58
End: 2023-07-19
Payer: COMMERCIAL

## 2023-07-19 VITALS — HEIGHT: 74 IN | BODY MASS INDEX: 29.48 KG/M2 | WEIGHT: 229.75 LBS

## 2023-07-19 DIAGNOSIS — Z86.31 HISTORY OF DIABETIC ULCER OF FOOT: ICD-10-CM

## 2023-07-19 DIAGNOSIS — N18.5 TYPE 2 DIABETES MELLITUS WITH STAGE 5 CHRONIC KIDNEY DISEASE: Primary | ICD-10-CM

## 2023-07-19 DIAGNOSIS — L90.9 PLANTAR FAT PAD ATROPHY: ICD-10-CM

## 2023-07-19 DIAGNOSIS — Z87.2 HEALED ULCER OF RIGHT FOOT ON EXAMINATION: ICD-10-CM

## 2023-07-19 DIAGNOSIS — E11.22 TYPE 2 DIABETES MELLITUS WITH STAGE 5 CHRONIC KIDNEY DISEASE: Primary | ICD-10-CM

## 2023-07-19 DIAGNOSIS — L84 PRE-ULCERATIVE CALLUSES: ICD-10-CM

## 2023-07-19 DIAGNOSIS — Z87.2 HEALED ULCER OF LEFT FOOT ON EXAMINATION: ICD-10-CM

## 2023-07-19 PROCEDURE — 3044F HG A1C LEVEL LT 7.0%: CPT | Mod: CPTII,S$GLB,, | Performed by: PODIATRIST

## 2023-07-19 PROCEDURE — 1159F MED LIST DOCD IN RCRD: CPT | Mod: CPTII,S$GLB,, | Performed by: PODIATRIST

## 2023-07-19 PROCEDURE — 1159F PR MEDICATION LIST DOCUMENTED IN MEDICAL RECORD: ICD-10-PCS | Mod: CPTII,S$GLB,, | Performed by: PODIATRIST

## 2023-07-19 PROCEDURE — 99213 OFFICE O/P EST LOW 20 MIN: CPT | Mod: S$GLB,,, | Performed by: PODIATRIST

## 2023-07-19 PROCEDURE — 3008F BODY MASS INDEX DOCD: CPT | Mod: CPTII,S$GLB,, | Performed by: PODIATRIST

## 2023-07-19 PROCEDURE — 99999 PR PBB SHADOW E&M-EST. PATIENT-LVL IV: ICD-10-PCS | Mod: PBBFAC,,, | Performed by: PODIATRIST

## 2023-07-19 PROCEDURE — 99999 PR PBB SHADOW E&M-EST. PATIENT-LVL IV: CPT | Mod: PBBFAC,,, | Performed by: PODIATRIST

## 2023-07-19 PROCEDURE — 1160F RVW MEDS BY RX/DR IN RCRD: CPT | Mod: CPTII,S$GLB,, | Performed by: PODIATRIST

## 2023-07-19 PROCEDURE — 1160F PR REVIEW ALL MEDS BY PRESCRIBER/CLIN PHARMACIST DOCUMENTED: ICD-10-PCS | Mod: CPTII,S$GLB,, | Performed by: PODIATRIST

## 2023-07-19 PROCEDURE — 99213 PR OFFICE/OUTPT VISIT, EST, LEVL III, 20-29 MIN: ICD-10-PCS | Mod: S$GLB,,, | Performed by: PODIATRIST

## 2023-07-19 PROCEDURE — 3008F PR BODY MASS INDEX (BMI) DOCUMENTED: ICD-10-PCS | Mod: CPTII,S$GLB,, | Performed by: PODIATRIST

## 2023-07-19 PROCEDURE — 3044F PR MOST RECENT HEMOGLOBIN A1C LEVEL <7.0%: ICD-10-PCS | Mod: CPTII,S$GLB,, | Performed by: PODIATRIST

## 2023-07-22 ENCOUNTER — PATIENT MESSAGE (OUTPATIENT)
Dept: GASTROENTEROLOGY | Facility: CLINIC | Age: 58
End: 2023-07-22
Payer: COMMERCIAL

## 2023-07-30 NOTE — PROGRESS NOTES
Subjective:      Patient ID: Catalino Mcfadden is a 58 y.o. male.    Chief Complaint: Diabetes Mellitus and Nail Care    Catalino is a 58 y.o. male who presents to the clinic for evaluation and treatment of high risk feet. Catalino has a past medical history of Diabetes mellitus, type 2, Diabetic foot ulcer associated with diabetes mellitus due to underlying condition (07/16/2020), ESRD on hemodialysis, Hyperlipidemia, Hypertension, and Obese. Reports new onset left heel blister x several days after a sock rubbed on the left heel. Seen in ED on 11/7/22.     11/16/2022 returns to clinic for follow up of left heel wound.  Seen by my colleague last week, cultures obtained (NGTD) and patient placed on PO abx.  He has kept dressing intact.     11/23/22: F/u left heel ulceration. Presents with calamine coflex left foot.     11/30/22: F/u left heel ulceration. Patient reports going to work this week.     12/14/22: F/u left heel ulceration. Presents in calamine coflex wrap.     12/21/22: F/u left heel ulceration. Presents in calamine coflex wrap.     12/27/22: F/u left heel ulceration. Presents in calamine coflex wrap. No new pedal complaints, continues to work     01/04/23: F/u left heel ulceration. Presents in calamine coflex wrap. No new pedal complaints, continues to work.  Continues to have hiccups      01/11/23: F/u left heel ulceration. Presents in calamine coflex wrap which appears to have padding which shifted medially. No new pedal complaints, continues to work.  Continues to have hiccups    01/18/23: F/u left heel ulceration. Presents in intact calamine coflex wrap. No new pedal complaints.    2/8/23: F/u right foot ulceration. Presents in calamine coflex wrap.     02/15/23: F/u right ulceration. Presents in intact calamine coflex wrap. No new pedal complaints.    3/15/23: F/u right foot ulceration. Presents in calamine coflex wrap.     03/22/23: F/u right ulceration. Presents in intact calamine coflex wrap. No new pedal  complaints.    04/12/23: Returns to clinic for foot inspection following achieving status of healed to bilateral foot wounds.  He has been attempting to care for feet as instructed. 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 Present in NB tennis shoes.  States he became a vegan on 04/04/2023. No new pedal complaints.    06/21/2023 patient returns to clinic for evaluation and treatment of high-risk feet.  He is status post achieving the status of healed to wounds of both feet.  He has been attempting to care the feet as instructed.  He continues to work and attempts to protect the feet in supportive shoes to the best of his ability.  Patient continues to suffer from hiccups.  No new pedal complaints.    07/19/2023 patient returns to clinic for evaluation and treatment of high-risk feet.   He has been attempting to care the feet as instructed. No longer using offloading pads He continues to work and attempts to protect the feet in supportive shoes to the best of his ability.  Patient continues to suffer from hiccups.  No new pedal complaints.      Chief Complaint   Patient presents with    Diabetes Mellitus    [facility] Care         Hemoglobin A1C   Date Value Ref Range Status   02/22/2023 5.1 4.0 - 5.6 % Final     Comment:     ADA Screening Guidelines:  5.7-6.4%  Consistent with prediabetes  >or=6.5%  Consistent with diabetes    High levels of fetal hemoglobin interfere with the HbA1C  assay. Heterozygous hemoglobin variants (HbS, HgC, etc)do  not significantly interfere with this assay.   However, presence of multiple variants may affect accuracy.     02/23/2022 5.0 4.0 - 5.6 % Final     Comment:     ADA Screening Guidelines:  5.7-6.4%  Consistent with prediabetes  >or=6.5%  Consistent with diabetes    High levels of fetal hemoglobin interfere with the HbA1C  assay. Heterozygous hemoglobin variants (HbS, HgC, etc)do  not significantly interfere with this assay.   However,  "presence of multiple variants may affect accuracy.     02/09/2022 5.0 4.0 - 5.6 % Final     Comment:     ADA Screening Guidelines:  5.7-6.4%  Consistent with prediabetes  >or=6.5%  Consistent with diabetes    High levels of fetal hemoglobin interfere with the HbA1C  assay. Heterozygous hemoglobin variants (HbS, HgC, etc)do  not significantly interfere with this assay.   However, presence of multiple variants may affect accuracy.         Review of Systems   Constitutional: Negative for chills.   Cardiovascular:  Negative for chest pain and claudication.   Respiratory:  Negative for cough.    Skin:  Positive for color change, dry skin and nail changes.   Musculoskeletal:  Positive for joint pain.   Gastrointestinal:  Negative for nausea.   Neurological:  Positive for paresthesias. Negative for numbness.   Psychiatric/Behavioral:  The patient is not nervous/anxious.            Objective:      Vitals:    07/19/23 0708   Weight: 104.2 kg (229 lb 11.5 oz)   Height: 6' 1.5" (1.867 m)         Physical Exam  Vitals and nursing note reviewed.   Constitutional:       General: He is not in acute distress.     Appearance: He is not toxic-appearing or diaphoretic.      Comments: Pt. is well-developed, well-nourished, appears stated age, in no acute distress, alert and oriented x 3. No evidence of depression, anxiety, or agitation. Calm, cooperative, and communicative. Appropriate interactions and affect.   Cardiovascular:      Pulses:           Dorsalis pedis pulses are 2+ on the right side and 2+ on the left side.        Posterior tibial pulses are 1+ on the right side and 1+ on the left side.      Comments: There is decreased digital hair. Skin is atrophic, slightly hyperpigmented  Pulmonary:      Effort: No respiratory distress.   Musculoskeletal:      Right lower leg: Edema present.      Left lower leg: Edema present.      Right ankle: Swelling present. No tenderness. No lateral malleolus, medial malleolus, AITF ligament, CF " ligament or posterior TF ligament tenderness.      Right Achilles Tendon: No defects. Hilliard's test negative.      Left ankle: Swelling present. No tenderness. No lateral malleolus, medial malleolus, AITF ligament, CF ligament or posterior TF ligament tenderness.      Left Achilles Tendon: No defects. Hilliard's test negative.      Right foot: No tenderness or bony tenderness.      Left foot: No tenderness or bony tenderness.      Comments: Decreased stride, station of gait.  apropulsive toe off.  Increased angle and base of gait.    There is equinus deformity bilateral with decreased dorsiflexion at the ankle joint bilateral. No tenderness with compression of heel. Negative tinels sign. Gait analysis reveals excessive pronation through midstance and propulsion with early heel off.     Patient has hammertoes of digits 2-5 bilateral partially reducible     Decreased first MPJ range of motion both weightbearing and nonweightbearing, no crepitus observed the first MP joint, + dorsal flag sign.     Visible and palpable bunion without pain at dorsomedial 1st metatarsal head right and left.  Hallux abducted right and left partially reducible, tracks laterally without being track bound.  No ecchymosis, erythema, edema, or cardinal signs infection or signs of trauma same foot.    Fat pad atrophy to heels and met heads bilateral    Plantarflexed 1st ray RLE   Lymphadenopathy:      Comments: No lymphatic streaking    Negative lymphadenopathy bilateral popliteal fossa and tarsal tunnel.     Skin:     General: Skin is warm and dry.      Coloration: Skin is not pale.      Findings: Lesion (see wound descriptions below) present. No ecchymosis, laceration or rash.      Nails: There is no clubbing.      Comments: Toenails 1-5 bilaterally discolored/yellowed, dystrophic, brittle with subungual debris.   Superficial abrasion left dorsal lateral foot.    Focal hyperkeratotic lesion consisting entirely of hyperkeratotic tissue  without open skin, drainage, pus, fluctuance, malodor, or signs of infection:  left plantar foot.       Neurological:      Sensory: Sensory deficit present.      Comments:  [place] 5.07 monofilamant testing is diminished Kp feet. Decreased/absent vibratory sensation bilateral feet to 128Hz tuning fork.     Paresthesias, and hyperesthesia bilateral feet with no clearly identified trigger or source.           Psychiatric:         Attention and Perception: He is attentive.         Mood and Affect: Mood is not anxious. Affect is not inappropriate.         Speech: He is communicative. Speech is not slurred.         Behavior: Behavior is not combative.       07/19/2023 06/21/2023 04/12/23:                    Assessment:       Encounter Diagnoses   Name Primary?    Type 2 diabetes mellitus with stage 5 chronic kidney disease Yes    Healed ulcer of left foot on examination     Healed ulcer of right foot on examination     History of diabetic ulcer of foot     Plantar fat pad atrophy     Pre-ulcerative calluses                  Plan:       Catalino was seen today for diabetes mellitus and nail care.    Diagnoses and all orders for this visit:    Type 2 diabetes mellitus with stage 5 chronic kidney disease    Healed ulcer of left foot on examination    Healed ulcer of right foot on examination    History of diabetic ulcer of foot    Plantar fat pad atrophy    Pre-ulcerative calluses            I counseled the patient on his conditions, their implications and medical management.    Education about the prevention of limb loss.    Discussed wound healing cycle, skin integrity, ways to care for skin.Counseled patient on the effects of biomechanical pressure on healing. He verbalizes understanding that it can increase the chances of delayed healing and this prolonged exposure leads to infection or progression of infection which subsequently can result in loss of limb.    Adequate vitamin  supplementation, protein intake, and hydration - discussed with patient    All wounds have remained healed. Skin is still delicate therefore patient must be diligent in avoiding excessive pressure and making sure there is adequate support and padding in shoe gear.     Follow-up: 4-6  weeks but should call [facility] immediately if any signs of infection, such as fever, chills, sweats, increased redness or pain.    Short-term goals include maintaining good offloading and minimizing bioburden, promoting granulation and epithelialization to healing.  Long-term goals include keeping the wound healed by good offloading and medical management under the direction of internist.    Shoe inspection. Diabetic Foot Education. Patient reminded of the importance of good nutrition and blood sugar control to help prevent podiatric complications of diabetes. Patient instructed on proper foot hygeine. We discussed wearing proper shoe gear, daily foot inspections, never walking without protective shoe gear, never putting sharp instruments to feet.

## 2023-08-01 ENCOUNTER — PATIENT MESSAGE (OUTPATIENT)
Dept: GASTROENTEROLOGY | Facility: CLINIC | Age: 58
End: 2023-08-01
Payer: COMMERCIAL

## 2023-08-03 ENCOUNTER — PATIENT OUTREACH (OUTPATIENT)
Dept: ADMINISTRATIVE | Facility: HOSPITAL | Age: 58
End: 2023-08-03
Payer: COMMERCIAL

## 2023-08-10 ENCOUNTER — TELEPHONE (OUTPATIENT)
Dept: VASCULAR SURGERY | Facility: CLINIC | Age: 58
End: 2023-08-10
Payer: COMMERCIAL

## 2023-08-10 NOTE — TELEPHONE ENCOUNTER
Pt call was returned. [facility]        ----- Message from Marguerite Cooper sent at 8/8/2023 11:07 AM CDT -----  .Type: Patient Call Back    Who called:self     What is the request in detail: patient is requesting a callback in regards to a new appointment , states current appointment wouldn't work he will not be in the city     Can the clinic reply by [facility]? call    Would the patient rather a call back or a response via [facility]? call    Best call back number:  [phone]     Additional Information:

## 2023-08-11 ENCOUNTER — TELEPHONE (OUTPATIENT)
Dept: GASTROENTEROLOGY | Facility: CLINIC | Age: 58
End: 2023-08-11
Payer: COMMERCIAL

## 2023-08-18 ENCOUNTER — OFFICE VISIT (OUTPATIENT)
Dept: VASCULAR SURGERY | Facility: CLINIC | Age: 58
End: 2023-08-18
Payer: COMMERCIAL

## 2023-08-18 VITALS
HEART RATE: 96 BPM | SYSTOLIC BLOOD PRESSURE: 160 MMHG | WEIGHT: 225.19 LBS | BODY MASS INDEX: 29.31 KG/M2 | DIASTOLIC BLOOD PRESSURE: 85 MMHG

## 2023-08-18 DIAGNOSIS — Z99.2 ARTERIOVENOUS FISTULA FOR HEMODIALYSIS IN PLACE, PRIMARY: Primary | ICD-10-CM

## 2023-08-18 PROCEDURE — 99024 PR POST-OP FOLLOW-UP VISIT: ICD-10-PCS | Mod: S$GLB,,, | Performed by: SURGERY

## 2023-08-18 PROCEDURE — 1159F MED LIST DOCD IN RCRD: CPT | Mod: CPTII,S$GLB,, | Performed by: SURGERY

## 2023-08-18 PROCEDURE — 3079F PR MOST RECENT DIASTOLIC BLOOD PRESSURE 80-89 MM HG: ICD-10-PCS | Mod: CPTII,S$GLB,, | Performed by: SURGERY

## 2023-08-18 PROCEDURE — 1159F PR MEDICATION LIST DOCUMENTED IN MEDICAL RECORD: ICD-10-PCS | Mod: CPTII,S$GLB,, | Performed by: SURGERY

## 2023-08-18 PROCEDURE — 99999 PR PBB SHADOW E&M-EST. PATIENT-LVL III: ICD-10-PCS | Mod: PBBFAC,,, | Performed by: SURGERY

## 2023-08-18 PROCEDURE — 3079F DIAST BP 80-89 MM HG: CPT | Mod: CPTII,S$GLB,, | Performed by: SURGERY

## 2023-08-18 PROCEDURE — 3044F HG A1C LEVEL LT 7.0%: CPT | Mod: CPTII,S$GLB,, | Performed by: SURGERY

## 2023-08-18 PROCEDURE — 99024 POSTOP FOLLOW-UP VISIT: CPT | Mod: S$GLB,,, | Performed by: SURGERY

## 2023-08-18 PROCEDURE — 3044F PR MOST RECENT HEMOGLOBIN A1C LEVEL <7.0%: ICD-10-PCS | Mod: CPTII,S$GLB,, | Performed by: SURGERY

## 2023-08-18 PROCEDURE — 3008F BODY MASS INDEX DOCD: CPT | Mod: CPTII,S$GLB,, | Performed by: SURGERY

## 2023-08-18 PROCEDURE — 3077F PR MOST RECENT SYSTOLIC BLOOD PRESSURE >= 140 MM HG: ICD-10-PCS | Mod: CPTII,S$GLB,, | Performed by: SURGERY

## 2023-08-18 PROCEDURE — 3077F SYST BP >= 140 MM HG: CPT | Mod: CPTII,S$GLB,, | Performed by: SURGERY

## 2023-08-18 PROCEDURE — 99999 PR PBB SHADOW E&M-EST. PATIENT-LVL III: CPT | Mod: PBBFAC,,, | Performed by: SURGERY

## 2023-08-18 PROCEDURE — 3008F PR BODY MASS INDEX (BMI) DOCUMENTED: ICD-10-PCS | Mod: CPTII,S$GLB,, | Performed by: SURGERY

## 2023-08-18 NOTE — PROGRESS NOTES
Daniel Poon MD [facility]                         08/18/2023    HPI:  Catalino Mcfadden is a 58 y.o. male with   Patient Active Problem List   Diagnosis    Type 2 diabetes mellitus with stage 5 chronic kidney disease    Essential hypertension    Hyperlipidemia, acquired    ED (erectile dysfunction)    History of diabetic ulcer of foot    Osteomyelitis of second toe of left foot    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    Long term (current) use of antibiotics    Acute osteomyelitis of metatarsal bone of left foot    Diabetic ulcer of toe of left foot    Hyponatremia    Osteomyelitis of left foot    Hypoalbuminemia    Healed ulcer of left foot on examination    Iron deficiency anemia    Metabolic acidosis    Anemia due to chronic kidney disease, on chronic dialysis    Diabetic ulcer of left midfoot associated with type 2 diabetes mellitus, with fat layer exposed    Type II diabetes mellitus with neurological manifestations    Foot ulcer, right, with fat layer exposed    Foot ulceration, left, with fat layer exposed    ESRD on hemodialysis    Malignant renovascular hypertension    Calcified granuloma of lung    Abnormal findings on diagnostic imaging of lung    Tracheobronchomalacia    being managed by PCP and specialists who is in need for long term dialysis access.  Patient is R handed.  Has been on HD for months.  No previous access surgery.  No defibrillator or pacemaker.  No recent UTI.    No extremity pain and swelling.     no MI  no Stroke  yes DM  yes HTN  no Lupus  no nephropathy  Tobacco use: denies    6/2023: Presents for f/u.  No new issues.  L foot wound has healed.    8/2023:  s/p SANGEETHA BC AVF 7/6/23.  Recovered well.  No HD US today.    Past Medical History:   Diagnosis Date    Diabetes mellitus, type 2     Diabetic foot ulcer associated with diabetes mellitus due to  underlying condition 07/16/2020    ESRD on hemodialysis     Hyperlipidemia     Hypertension     Obese      Past Surgical History:   Procedure Laterality Date    AV FISTULA PLACEMENT Left 7/6/2023    Procedure: CREATION, AV FISTULA;  Surgeon: Daniel Poon MD;  Location: [facility] OR;  Service: Vascular;  Laterality: Left;  RN PREOP 6/28/2023  LABS DAY OF SURGERY    BONE BIOPSY Left 7/17/2020    Procedure: BIOPSY, BONE;  Surgeon: Verona Whitmore DPM;  Location: [facility] OR;  Service: Podiatry;  Laterality: Left;    CERVICAL DISC SURGERY  07/11/2016    DEBRIDEMENT Left 7/17/2020    Procedure: DEBRIDEMENT;  Surgeon: Verona Whitmore DPM;  Location: [facility] OR;  Service: Podiatry;  Laterality: Left;    DEBRIDEMENT OF FOOT Right     toes & plantar surface    ESOPHAGEAL MANOMETRY WITH MEASUREMENT OF IMPEDANCE N/A 3/27/2023    Procedure: MANOMETRY, ESOPHAGUS, WITH IMPEDANCE MEASUREMENT;  Surgeon: Roopa Pérez MD;  Location: [facility] (4TH FLR);  Service: Endoscopy;  Laterality: N/A;  Belching and rumination protocol please  instructions via portal - sm    ESOPHAGOGASTRODUODENOSCOPY N/A 12/16/2022    Procedure: EGD (ESOPHAGOGASTRODUODENOSCOPY);  Surgeon: Eren Francois MD;  Location: [facility];  Service: Endoscopy;  Laterality: N/A;  Pt. on Dialysis. K+ ordered prior to procedure.EC    FRACTURE SURGERY Right     medial ankle, metal plate present    INSERTION OF TUNNELED CENTRAL VENOUS CATHETER (CVC) WITH SUBCUTANEOUS PORT N/A 6/11/2021    Procedure: TUNNEL CATH INSERTION WITHOUT PORT;  Surgeon: Jose Manuel Diagnostic Provider;  Location: [facility];  Service: Radiology;  Laterality: N/A;  11AM START---PHONE PREOP 6/10/21---COVID POSTIVE ON 7/2020---NO S/S    left leg orthopedic surgery Left      Family History   Problem Relation Age of Onset    Heart disease Father     Colon cancer Neg Hx     Esophageal cancer Neg Hx      Social History     Socioeconomic History    Marital status: Other    Number of children: 1   Tobacco Use     Smoking status: Never    Smokeless tobacco: Never   Substance and Sexual Activity    Alcohol use: Yes     Comment: social    Drug use: No    Sexual activity: Not Currently   Social History Narrative    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     Social Determinants of Health     Financial Resource Strain: Low Risk  (9/26/2022)    Overall Financial Resource Strain (CARDIA)     Difficulty of Paying Living Expenses: Not very hard   Food Insecurity: No Food Insecurity (9/26/2022)    Hunger Vital Sign     Worried About Running Out of Food in the Last Year: Never true     Ran Out of Food in the Last Year: Never true   Transportation Needs: No Transportation Needs (9/26/2022)    PRAPARE - Transportation     Lack of Transportation (Medical): No     Lack of Transportation (Non-Medical): No   Physical Activity: Unknown (9/26/2022)    Exercise Vital Sign     Days of Exercise per Week: Patient refused     Minutes of Exercise per Session: Patient refused   Stress: Stress Concern Present (9/26/2022)    [employer] [place] [employer] - Occupational Stress Questionnaire     Feeling of Stress : To some extent   Social Connections: Moderately Integrated (9/26/2022)    Social Connection and Isolation Panel [NHANES]     Frequency of Communication with Friends and Family: Three times a week     Frequency of Social Gatherings with Friends and Family: Three times a week     Attends [employer]ism Services: 1 to 4 times per year     Active Member of Clubs or Organizations: No     Attends Club or Organization Meetings: Never     Marital Status:    Housing Stability: Low Risk  (9/26/2022)    Housing Stability Vital Sign     Unable to Pay for Housing in the Last Year: No     Number of Places Lived in the Last Year: 1     Unstable Housing in the Last Year: No       Current Outpatient Medications:     bethanechol (URECHOLINE) 10 MG Tab, Take 1 tablet (10 mg total) by mouth 3 (three) times daily., Disp: 90 tablet, Rfl: 11    dulaglutide  (TRULICITY) 0.75 mg/0.5 mL pen injector, INJECT 0.5 ML UNDER THE SKIN EVERY 7 DAYS, Disp: 12 pen, Rfl: 0    esomeprazole (NEXIUM) 40 MG capsule, Take 1 capsule (40 mg total) by mouth 2 (two) times daily before meals., Disp: 60 capsule, Rfl: 11    gabapentin (NEURONTIN) 100 MG capsule, Take 1 capsule (100 mg total) by mouth 3 (three) times daily., Disp: 90 capsule, Rfl: 11    heparin sod,pork in 0.45% NaCl (HEPARIN, PORCINE, 100 UNITS) 100 unit/100 mL (1 unit/mL) SolP in sodium chloride 0.45 % 100 mL infusion, Heparin Sodium (Porcine) 1,000 Units/mL Systemic, Disp: , Rfl:     NIFEdipine (PROCARDIA XL) 90 MG (OSM) 24 hr tablet, Take 1 tablet (90 mg total) by mouth once daily., Disp: 90 tablet, Rfl: 3    ondansetron (ZOFRAN-ODT) 4 MG TbDL, Take 1 tablet (4 mg total) by mouth every 6 (six) hours as needed (nausea and vomiting)., Disp: 30 tablet, Rfl: 3    pantoprazole (PROTONIX) 40 MG tablet, Take 1 tablet by mouth., Disp: , Rfl:     promethazine (PHENERGAN) 12.5 MG Tab, Take 1 tablet (12.5 mg total) by mouth every 6 (six) hours as needed (nausea and vomiting)., Disp: 30 tablet, Rfl: 3    sucroferric oxyhydroxide (VELPHORO) 500 mg Chew, Take 1 tablet by mouth., Disp: , Rfl:     torsemide (DEMADEX) 20 MG Tab, Take 1 tablet (20 mg total) by mouth once daily. Take once a day on non-HD days, Disp: 90 tablet, Rfl: 3    aspirin 81 MG Chew, Take 1 tablet (81 mg total) by mouth once daily., Disp: 30 tablet, Rfl: 0    atorvastatin (LIPITOR) 40 MG tablet, Take 1 tablet (40 mg total) by mouth once daily., Disp: 30 tablet, Rfl: 0    hydrALAZINE (APRESOLINE) 100 MG tablet, Take 1 tablet (100 mg total) by mouth 3 (three) times daily., Disp: 90 tablet, Rfl: 0    sevelamer carbonate (RENVELA) 800 mg Tab, Take 1 tablet (800 mg total) by mouth 3 (three) times daily with meals., Disp: 90 tablet, Rfl: 11    REVIEW OF SYSTEMS:  General: No fevers or chills; ENT: No sore throat; Allergy and Immunology: no persistent infections; Hematological  "and Lymphatic: No history of bleeding or easy bruising; Endocrine: negative; Respiratory: no cough, shortness of breath, or wheezing; Cardiovascular: no chest pain or dyspnea on exertion; Gastrointestinal: no abdominal pain/back, change in bowel habits, or bloody stools; Genito-Urinary: no dysuria, trouble voiding, or hematuria; Musculoskeletal: negative; Neurological: no TIA or stroke symptoms; Psychiatric: no nervousness, anxiety or depression.    PHYSICAL EXAM:      Pulse: 96         General appearance:  Alert, well-appearing, and in no distress.  Oriented to person, place, and time                    Neurological: Normal speech, no focal findings noted; CN II - XII grossly intact. BUE with sensation to light touch.            Musculoskeletal: Digits/nail without cyanosis/clubbing.  Strength 5/5 BUE.                    Neck: Supple, no significant adenopathy, no carotid bruit can be auscultated                  Chest:  Clear to auscultation, no wheezes, rales or rhonchi, symmetric air entry. No use of accessory muscles               Cardiac: Normal rate and regular rhythm, S1 and S2 normal            Abdomen: Soft, nontender, nondistended, no masses or organomegaly, no hernia     No rebound tenderness noted; bowel sounds normal     Pulsatile aortic mass is non palpable.     No groin adenopathy      Extremities:   2 + R radial pulse, 2 + L radial pulse     2 + R brachial pulse, 2 + L brachial pulse     no RUE edema, no LUE edema     neg Dutch's test RUE, neg Dutch's test LUE, +thrill LUE AVF, inc well healed    Skin: No tissue loss    LAB RESULTS:  No results found for: "CBC"  Lab Results   Component Value Date    LABPROT 11.4 06/28/2023    INR 1.1 06/28/2023     Lab Results   Component Value Date     (L) 07/06/2023    K 3.6 07/06/2023    CL 95 07/06/2023    CO2 26 07/06/2023     07/06/2023    BUN 15 07/06/2023    CREATININE 3.9 (H) 07/06/2023    CALCIUM 9.1 07/06/2023    ANIONGAP 10 07/06/2023    " EGFRNONAA 9.0 (A) 02/23/2022     Lab Results   Component Value Date    WBC 5.72 07/06/2023    RBC 3.66 (L) 07/06/2023    HGB 10.4 (L) 07/06/2023    HCT 32.3 (L) 07/06/2023    MCV 88 07/06/2023    MCH 28.4 07/06/2023    MCHC 32.2 07/06/2023    RDW 14.5 07/06/2023     (L) 07/06/2023    MPV 9.5 07/06/2023    GRAN 4.1 07/06/2023    GRAN 71.7 07/06/2023    LYMPH 0.7 (L) 07/06/2023    LYMPH 12.1 (L) 07/06/2023    MONO 0.7 07/06/2023    MONO 12.4 07/06/2023    EOS 0.1 07/06/2023    BASO 0.05 07/06/2023    EOSINOPHIL 2.4 07/06/2023    BASOPHIL 0.9 07/06/2023    DIFFMETHOD Automated 07/06/2023     .  Lab Results   Component Value Date    HGBA1C 5.1 02/22/2023       IMAGING:  All pertinent imaging has been reviewed and interpreted independently.    Adequate vein BUE 2022    IMP/PLAN:  58 y.o. male with   Patient Active Problem List   Diagnosis    Type 2 diabetes mellitus with stage 5 chronic kidney disease    Essential hypertension    Hyperlipidemia, acquired    ED (erectile dysfunction)    History of diabetic ulcer of foot    Osteomyelitis of second toe of left foot    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    Long term (current) use of antibiotics    Acute osteomyelitis of metatarsal bone of left foot    Diabetic ulcer of toe of left foot    Hyponatremia    Osteomyelitis of left foot    Hypoalbuminemia    Healed ulcer of left foot on examination    Iron deficiency anemia    Metabolic acidosis    Anemia due to chronic kidney disease, on chronic dialysis    Diabetic ulcer of left midfoot associated with type 2 diabetes mellitus, with fat layer exposed    Type II diabetes mellitus with neurological manifestations    Foot ulcer, right, with fat layer exposed    Foot ulceration, left, with fat layer exposed    ESRD on hemodialysis    Malignant renovascular hypertension    Calcified granuloma of lung    Abnormal findings on diagnostic imaging  of lung    Tracheobronchomalacia    being managed by PCP and specialists who is here today for evaluation of long term HD access.    -rec HD US to eval for maturation  -will contact HD center if ok to be used - cont catheter at this time      I spent 13 minutes evaluating this patient and greater than 50% of the time was spent counseling, coordinator care and discussing the plan of care.  All questions were answered and patient stated understanding with agreement with the above treatment plan.    Daniel Poon MD RPVI  Vascular and Endovascular Surgery

## 2023-08-23 ENCOUNTER — OFFICE VISIT (OUTPATIENT)
Dept: PODIATRY | Facility: CLINIC | Age: 58
End: 2023-08-23
Payer: COMMERCIAL

## 2023-08-23 VITALS — WEIGHT: 225 LBS | BODY MASS INDEX: 29.82 KG/M2 | HEIGHT: 73 IN

## 2023-08-23 DIAGNOSIS — L90.9 PLANTAR FAT PAD ATROPHY: ICD-10-CM

## 2023-08-23 DIAGNOSIS — M20.5X2 HALLUX LIMITUS, ACQUIRED, LEFT: ICD-10-CM

## 2023-08-23 DIAGNOSIS — R26.89 KEEPS LOSING BALANCE: ICD-10-CM

## 2023-08-23 DIAGNOSIS — E11.22 TYPE 2 DIABETES MELLITUS WITH STAGE 5 CHRONIC KIDNEY DISEASE: Primary | ICD-10-CM

## 2023-08-23 DIAGNOSIS — Z86.31 HISTORY OF DIABETIC ULCER OF FOOT: ICD-10-CM

## 2023-08-23 DIAGNOSIS — L84 PRE-ULCERATIVE CALLUSES: ICD-10-CM

## 2023-08-23 DIAGNOSIS — N18.5 TYPE 2 DIABETES MELLITUS WITH STAGE 5 CHRONIC KIDNEY DISEASE: Primary | ICD-10-CM

## 2023-08-23 DIAGNOSIS — M20.5X1 HALLUX LIMITUS, ACQUIRED, RIGHT: ICD-10-CM

## 2023-08-23 PROCEDURE — 1159F MED LIST DOCD IN RCRD: CPT | Mod: CPTII,S$GLB,, | Performed by: PODIATRIST

## 2023-08-23 PROCEDURE — 3008F BODY MASS INDEX DOCD: CPT | Mod: CPTII,S$GLB,, | Performed by: PODIATRIST

## 2023-08-23 PROCEDURE — 1160F RVW MEDS BY RX/DR IN RCRD: CPT | Mod: CPTII,S$GLB,, | Performed by: PODIATRIST

## 2023-08-23 PROCEDURE — 99214 OFFICE O/P EST MOD 30 MIN: CPT | Mod: S$GLB,,, | Performed by: PODIATRIST

## 2023-08-23 PROCEDURE — 99999 PR PBB SHADOW E&M-EST. PATIENT-LVL IV: CPT | Mod: PBBFAC,,, | Performed by: PODIATRIST

## 2023-08-23 PROCEDURE — 3044F HG A1C LEVEL LT 7.0%: CPT | Mod: CPTII,S$GLB,, | Performed by: PODIATRIST

## 2023-08-23 PROCEDURE — 1160F PR REVIEW ALL MEDS BY PRESCRIBER/CLIN PHARMACIST DOCUMENTED: ICD-10-PCS | Mod: CPTII,S$GLB,, | Performed by: PODIATRIST

## 2023-08-23 PROCEDURE — 99999 PR PBB SHADOW E&M-EST. PATIENT-LVL IV: ICD-10-PCS | Mod: PBBFAC,,, | Performed by: PODIATRIST

## 2023-08-23 PROCEDURE — 3044F PR MOST RECENT HEMOGLOBIN A1C LEVEL <7.0%: ICD-10-PCS | Mod: CPTII,S$GLB,, | Performed by: PODIATRIST

## 2023-08-23 PROCEDURE — 3008F PR BODY MASS INDEX (BMI) DOCUMENTED: ICD-10-PCS | Mod: CPTII,S$GLB,, | Performed by: PODIATRIST

## 2023-08-23 PROCEDURE — 1159F PR MEDICATION LIST DOCUMENTED IN MEDICAL RECORD: ICD-10-PCS | Mod: CPTII,S$GLB,, | Performed by: PODIATRIST

## 2023-08-23 PROCEDURE — 99214 PR OFFICE/OUTPT VISIT, EST, LEVL IV, 30-39 MIN: ICD-10-PCS | Mod: S$GLB,,, | Performed by: PODIATRIST

## 2023-08-23 NOTE — PATIENT INSTRUCTIONS
Over the counter pain creams: Voltaren Gel, Biofreeze, Bengay, tiger balm, two old goat, lidocaine gel,  Absorbine Veterinary Liniment Gel Topical Analgesic Sore Muscle and Joint Pain Relief  Recommend lotions: eucerin, eucerin for diabetics, aquaphor, A&D ointment, gold bond for diabetics, sween, [place]'s Bees all purpose baby ointment,  urea 40 with aloe or SkinIntegra rapid crack repair (found on amazon.com)  Shoe recommendations: (try 6pm.com, zappos.com , nordstromrack.[email or web address] [email or web address], or shoes.com for discounted prices) you can visit varsity shoes in [place], DSW shoes in [place]  or bob rack in the [facility] (there are also several shoe brand outlets in the [facility])  ONLY purchase stability style tennis shoes NO flex, foam, free, yoga mat style shoes  Asics (GT 4000 or gel foundations), new balance stability type shoes (such as the 940 series), saucony (stabil c3),  Valencia (GTS or Beast or transcend), propet, HokaOne (tennis shoe) Jaden (tennis shoes and boots)  Sofft Brand (women) Reginald&Desmond (men), clarks, crocs, aerosoles, naturalizers, SAS, ecco, born, pauline buck, rockports (dress shoes)  Vionic, burkenstocks, fitflops, propet, taos, baretraps (sandals)  HokaOne sandals, crocs, propet (house shoes)    Nail Home remedy:  Vicks Vapor rub or Emuaid to nails for easier manageability    Diabetes: Inspecting Your Feet  Diabetes increases your chances of developing foot problems. So inspect your feet every day. This helps you find small skin irritations before they become serious infections. If you have trouble seeing the bottoms of your feet, use a mirror or ask a family member or friend to help.     Pressure spots on the bottom of the foot are common areas where problems develop.   How to check your feet  Below are tips to help you look for foot problems. Try to check your feet at the same time each day, such as when you get out of bed in the morning:  Check the top of each foot. The tops of toes, back of  the heel, and outer edge of the foot can get a lot of rubbing from poor-fitting shoes.  Check the bottom of each foot. Daily wear and tear often leads to problems at pressure spots.  Check the toes and nails. Fungal infections often occur between toes. Toenail problems can also be a sign of fungal infections or lead to breaks in the skin.  Check your shoes, too. Loose objects inside a shoe can injure the foot. Use your hand to feel inside your shoes for things like james, loose stitching, or rough areas that could irritate your skin.  Warning signs  Look for any color changes in the foot. Redness with streaks can signal a severe infection, which needs immediate medical attention. Tell your doctor right away if you have any of these problems:  Swelling, sometimes with color changes, may be a sign of poor blood flow or infection. Symptoms include tenderness and an increase in the size of your foot.  Warm or hot areas on your feet may be signs of infection. A foot that is cold may not be getting enough blood.  Sensations such as burning, tingling, or pins and needles can be signs of a problem. Also check for areas that may be numb.  Hot spots are caused by friction or pressure. Look for hot spots in areas that get a lot of rubbing. Hot spots can turn into blisters, calluses, or sores.  Cracks and sores are caused by dry or irritated skin. They are a sign that the skin is breaking down, which can lead to infection.  Toenail problems to watch for include nails growing into the skin (ingrown toenail) and causing redness or pain. Thick, yellow, or discolored nails can signal a fungal infection.  Drainage and odor can develop from untreated sores and ulcers. Call your doctor right away if you notice white or yellow drainage, bleeding, or unpleasant odor.   © 6662-9407 The [employer]. [address] [place] [address], [place] [address]. All rights reserved. This information is not intended as a substitute for  professional medical care. Always follow your healthcare professional's instructions.        Step-by-Step:  Inspecting Your Feet (Diabetes)    Date Last Reviewed: 10/1/2016  © 6194-2392 [employer]. [address] [place] [address], [place] [address]. All rights reserved. This information is not intended as a substitute for professional medical care. Always follow your healthcare professional's instructions.

## 2023-08-23 NOTE — PROGRESS NOTES
Subjective:      Patient ID: Catalino Mcfadden is a 58 y.o. male.    Chief Complaint: Diabetes Mellitus, Foot Problem, and Follow-up    Catalino is a 58 y.o. male who presents to the clinic for evaluation and treatment of high risk feet. Catalino has a past medical history of Diabetes mellitus, type 2, Diabetic foot ulcer associated with diabetes mellitus due to underlying condition (07/16/2020), ESRD on hemodialysis, Hyperlipidemia, Hypertension, and Obese. Reports new onset left heel blister x several days after a sock rubbed on the left heel. Seen in ED on 11/7/22.     11/16/2022 returns to clinic for follow up of left heel wound.  Seen by my colleague last week, cultures obtained (NGTD) and patient placed on PO abx.  He has kept dressing intact.     11/23/22: F/u left heel ulceration. Presents with calamine coflex left foot.     11/30/22: F/u left heel ulceration. Patient reports going to work this week.     12/14/22: F/u left heel ulceration. Presents in calamine coflex wrap.     12/21/22: F/u left heel ulceration. Presents in calamine coflex wrap.     12/27/22: F/u left heel ulceration. Presents in calamine coflex wrap. No new pedal complaints, continues to work     01/04/23: F/u left heel ulceration. Presents in calamine coflex wrap. No new pedal complaints, continues to work.  Continues to have hiccups      01/11/23: F/u left heel ulceration. Presents in calamine coflex wrap which appears to have padding which shifted medially. No new pedal complaints, continues to work.  Continues to have hiccups    01/18/23: F/u left heel ulceration. Presents in intact calamine coflex wrap. No new pedal complaints.    2/8/23: F/u right foot ulceration. Presents in calamine coflex wrap.     02/15/23: F/u right ulceration. Presents in intact calamine coflex wrap. No new pedal complaints.    3/15/23: F/u right foot ulceration. Presents in calamine coflex wrap.     03/22/23: F/u right ulceration. Presents in intact calamine coflex wrap.  No new pedal complaints.    04/12/23: Returns to clinic for foot inspection following achieving status of healed to bilateral foot wounds.  He has been attempting to care for feet as instructed. 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 Present in NB tennis shoes.  States he became a vegan on 04/04/2023. No new pedal complaints.    06/21/2023 patient returns to clinic for evaluation and treatment of high-risk feet.  He is status post achieving the status of healed to wounds of both feet.  He has been attempting to care the feet as instructed.  He continues to work and attempts to protect the feet in supportive shoes to the best of his ability.  Patient continues to suffer from hiccups.  No new pedal complaints.    07/19/2023 patient returns to clinic for evaluation and treatment of high-risk feet.   He has been attempting to care the feet as instructed. No longer using offloading pads He continues to work and attempts to protect the feet in supportive shoes to the best of his ability.  Patient continues to suffer from hiccups.  No new pedal complaints.    08/23/2023 patient returns to clinic for evaluation treatment of high-risk feet.  He continues to care for feet as instructed.  Recently acquired a new pair of tennis shoes, Denzel 1.  Continues to work for relates he may be looking for new employment in the coming weeks.  Patient relates a new symptom which is loss of balance or feeling like he is losing his equilibrium with walking and with rising.  He denies falls.  He denies nausea, vomiting, fever, chills.  He continues to have hiccups.      Chief Complaint   Patient presents with    Diabetes Mellitus    Foot Problem    Follow-up         Hemoglobin A1C   Date Value Ref Range Status   02/22/2023 5.1 4.0 - 5.6 % Final     Comment:     ADA Screening Guidelines:  5.7-6.4%  Consistent with prediabetes  >or=6.5%  Consistent with diabetes    High levels of fetal hemoglobin  "interfere with the HbA1C  assay. Heterozygous hemoglobin variants (HbS, HgC, etc)do  not significantly interfere with this assay.   However, presence of multiple variants may affect accuracy.     02/23/2022 5.0 4.0 - 5.6 % Final     Comment:     ADA Screening Guidelines:  5.7-6.4%  Consistent with prediabetes  >or=6.5%  Consistent with diabetes    High levels of fetal hemoglobin interfere with the HbA1C  assay. Heterozygous hemoglobin variants (HbS, HgC, etc)do  not significantly interfere with this assay.   However, presence of multiple variants may affect accuracy.     02/09/2022 5.0 4.0 - 5.6 % Final     Comment:     ADA Screening Guidelines:  5.7-6.4%  Consistent with prediabetes  >or=6.5%  Consistent with diabetes    High levels of fetal hemoglobin interfere with the HbA1C  assay. Heterozygous hemoglobin variants (HbS, HgC, etc)do  not significantly interfere with this assay.   However, presence of multiple variants may affect accuracy.         Review of Systems   Constitutional: Negative for chills.   Cardiovascular:  Negative for chest pain and claudication.   Respiratory:  Negative for cough.    Skin:  Positive for color change, dry skin and nail changes.   Musculoskeletal:  Positive for joint pain.   Gastrointestinal:  Negative for nausea.   Neurological:  Positive for disturbances in coordination, numbness and paresthesias.   Psychiatric/Behavioral:  The patient is not nervous/anxious.            Objective:      Vitals:    08/23/23 0710   Weight: 102.1 kg (225 lb)   Height: 6' 1" (1.854 m)   PainSc: 0-No pain         Physical Exam  Vitals and nursing note reviewed.   Constitutional:       General: He is not in acute distress.     Appearance: He is not toxic-appearing or diaphoretic.      Comments: Pt. is well-developed, well-nourished, appears stated age, in no acute distress, alert and oriented x 3. No evidence of depression, anxiety, or agitation. Calm, cooperative, and communicative. Appropriate " interactions and affect.   Cardiovascular:      Pulses:           Dorsalis pedis pulses are 2+ on the right side and 2+ on the left side.        Posterior tibial pulses are 1+ on the right side and 1+ on the left side.      Comments: There is decreased digital hair. Skin is atrophic, slightly hyperpigmented  Pulmonary:      Effort: No respiratory distress.   Musculoskeletal:      Right lower leg: Edema present.      Left lower leg: Edema present.      Right ankle: Swelling present. No tenderness. No lateral malleolus, medial malleolus, AITF ligament, CF ligament or posterior TF ligament tenderness.      Right Achilles Tendon: No defects. Hilliard's test negative.      Left ankle: Swelling present. No tenderness. No lateral malleolus, medial malleolus, AITF ligament, CF ligament or posterior TF ligament tenderness.      Left Achilles Tendon: No defects. Hilliard's test negative.      Right foot: No tenderness or bony tenderness.      Left foot: No tenderness or bony tenderness.      Comments: Decreased stride, station of gait.  apropulsive toe off.  Increased angle and base of gait.    There is equinus deformity bilateral with decreased dorsiflexion at the ankle joint bilateral. No tenderness with compression of heel. Negative tinels sign. Gait analysis reveals excessive pronation through midstance and propulsion with early heel off.     Patient has hammertoes of digits 2-5 bilateral partially reducible     Decreased first MPJ range of motion both weightbearing and nonweightbearing, no crepitus observed the first MP joint, + dorsal flag sign.     Visible and palpable bunion without pain at dorsomedial 1st metatarsal head right and left.  Hallux abducted right and left partially reducible, tracks laterally without being track bound.  No ecchymosis, erythema, edema, or cardinal signs infection or signs of trauma same foot.    Fat pad atrophy to heels and met heads bilateral    Plantarflexed 1st ray RLE    Lymphadenopathy:      Comments: No lymphatic streaking    Negative lymphadenopathy bilateral popliteal fossa and tarsal tunnel.     Skin:     General: Skin is warm and dry.      Coloration: Skin is not pale.      Findings: Lesion (see wound descriptions below) present. No ecchymosis, laceration or rash.      Nails: There is no clubbing.      Comments: Toenails 1-5 bilaterally discolored/yellowed, dystrophic, brittle with subungual debris.   Superficial abrasion left dorsal lateral foot.    Focal hyperkeratotic lesion consisting entirely of hyperkeratotic tissue without open skin, drainage, pus, fluctuance, malodor, or signs of infection:  left plantar foot.       Neurological:      Sensory: Sensory deficit present.      Comments:  Elizabeth-Dayton 5.07 monofilamant testing is diminished Kp feet. Decreased/absent vibratory sensation bilateral feet to 128Hz tuning fork.     Paresthesias, and hyperesthesia bilateral feet with no clearly identified trigger or source.           Psychiatric:         Attention and Perception: He is attentive.         Mood and Affect: Mood is not anxious. Affect is not inappropriate.         Speech: He is communicative. Speech is not slurred.         Behavior: Behavior is not combative.       08/23/2023     Left foot                Right foot              07/19/2023 06/21/2023 04/12/23:                    Assessment:       Encounter Diagnoses   Name Primary?    Type 2 diabetes mellitus with stage 5 chronic kidney disease Yes    History of diabetic ulcer of foot     Plantar fat pad atrophy     Pre-ulcerative calluses     Hallux limitus, acquired, right     Hallux limitus, acquired, left     Keeps losing balance                  Plan:       Catalino was seen today for diabetes mellitus, foot problem and follow-up.    Diagnoses and all orders for this visit:    Type 2 diabetes mellitus with stage 5 chronic kidney disease  -     Ambulatory referral/consult  to Neurology; Future    History of diabetic ulcer of foot    Plantar fat pad atrophy    Pre-ulcerative calluses    Hallux limitus, acquired, right    Hallux limitus, acquired, left    Keeps losing balance  -     Ambulatory referral/consult to Neurology; Future          I counseled the patient on his conditions, their implications and medical management.    Education about the prevention of limb loss.    Discussed wound healing cycle, skin integrity, ways to care for skin.Counseled patient on the effects of biomechanical pressure on healing. He verbalizes understanding that it can increase the chances of delayed healing and this prolonged exposure leads to infection or progression of infection which subsequently can result in loss of limb.    Adequate vitamin supplementation, protein intake, and hydration - discussed with patient    All wounds have remained healed. Skin is still delicate therefore patient must be diligent in avoiding excessive pressure and making sure there is adequate support and padding in shoe gear.     I am concerned about this new neurological symptom of loss of balance and the feeling of falling.  Patient relates that although he has been diagnosed with neuropathy for several years he is never seen a neurologist.  Referral placed to neurologist for evaluation and treatment.    Follow-up: 8-12 weeks but should call [facility] immediately if any signs of infection, such as fever, chills, sweats, increased redness or pain.    Short-term goals include maintaining good offloading and minimizing bioburden, promoting granulation and epithelialization to healing.  Long-term goals include keeping the wound healed by good offloading and medical management under the direction of internist.    Shoe inspection. Diabetic Foot Education. Patient reminded of the importance of good nutrition and blood sugar control to help prevent podiatric complications of diabetes. Patient instructed on proper foot hygeine. We  discussed wearing proper shoe gear, daily foot inspections, never walking without protective shoe gear, never putting sharp instruments to feet.

## 2023-08-28 ENCOUNTER — OFFICE VISIT (OUTPATIENT)
Dept: GASTROENTEROLOGY | Facility: CLINIC | Age: 58
End: 2023-08-28
Payer: COMMERCIAL

## 2023-08-28 VITALS
HEIGHT: 73 IN | DIASTOLIC BLOOD PRESSURE: 74 MMHG | BODY MASS INDEX: 29.82 KG/M2 | SYSTOLIC BLOOD PRESSURE: 157 MMHG | HEART RATE: 74 BPM | WEIGHT: 225 LBS

## 2023-08-28 DIAGNOSIS — E44.0 MODERATE PROTEIN-CALORIE MALNUTRITION: ICD-10-CM

## 2023-08-28 DIAGNOSIS — K22.4 INEFFECTIVE ESOPHAGEAL MOTILITY: Primary | ICD-10-CM

## 2023-08-28 PROCEDURE — 1159F MED LIST DOCD IN RCRD: CPT | Mod: CPTII,S$GLB,, | Performed by: INTERNAL MEDICINE

## 2023-08-28 PROCEDURE — 99214 OFFICE O/P EST MOD 30 MIN: CPT | Mod: S$GLB,,, | Performed by: INTERNAL MEDICINE

## 2023-08-28 PROCEDURE — 99999 PR PBB SHADOW E&M-EST. PATIENT-LVL IV: ICD-10-PCS | Mod: PBBFAC,,, | Performed by: INTERNAL MEDICINE

## 2023-08-28 PROCEDURE — 3044F HG A1C LEVEL LT 7.0%: CPT | Mod: CPTII,S$GLB,, | Performed by: INTERNAL MEDICINE

## 2023-08-28 PROCEDURE — 3077F PR MOST RECENT SYSTOLIC BLOOD PRESSURE >= 140 MM HG: ICD-10-PCS | Mod: CPTII,S$GLB,, | Performed by: INTERNAL MEDICINE

## 2023-08-28 PROCEDURE — 99999 PR PBB SHADOW E&M-EST. PATIENT-LVL IV: CPT | Mod: PBBFAC,,, | Performed by: INTERNAL MEDICINE

## 2023-08-28 PROCEDURE — 3044F PR MOST RECENT HEMOGLOBIN A1C LEVEL <7.0%: ICD-10-PCS | Mod: CPTII,S$GLB,, | Performed by: INTERNAL MEDICINE

## 2023-08-28 PROCEDURE — 3008F BODY MASS INDEX DOCD: CPT | Mod: CPTII,S$GLB,, | Performed by: INTERNAL MEDICINE

## 2023-08-28 PROCEDURE — 99214 PR OFFICE/OUTPT VISIT, EST, LEVL IV, 30-39 MIN: ICD-10-PCS | Mod: S$GLB,,, | Performed by: INTERNAL MEDICINE

## 2023-08-28 PROCEDURE — 3077F SYST BP >= 140 MM HG: CPT | Mod: CPTII,S$GLB,, | Performed by: INTERNAL MEDICINE

## 2023-08-28 PROCEDURE — 3078F PR MOST RECENT DIASTOLIC BLOOD PRESSURE < 80 MM HG: ICD-10-PCS | Mod: CPTII,S$GLB,, | Performed by: INTERNAL MEDICINE

## 2023-08-28 PROCEDURE — 3078F DIAST BP <80 MM HG: CPT | Mod: CPTII,S$GLB,, | Performed by: INTERNAL MEDICINE

## 2023-08-28 PROCEDURE — 1159F PR MEDICATION LIST DOCUMENTED IN MEDICAL RECORD: ICD-10-PCS | Mod: CPTII,S$GLB,, | Performed by: INTERNAL MEDICINE

## 2023-08-28 PROCEDURE — 3008F PR BODY MASS INDEX (BMI) DOCUMENTED: ICD-10-PCS | Mod: CPTII,S$GLB,, | Performed by: INTERNAL MEDICINE

## 2023-08-28 RX ORDER — AMITRIPTYLINE HYDROCHLORIDE 10 MG/1
10 TABLET, FILM COATED ORAL NIGHTLY PRN
Qty: 30 EACH | Refills: 11 | Status: ON HOLD | OUTPATIENT
Start: 2023-08-28 | End: 2023-12-05 | Stop reason: HOSPADM

## 2023-08-28 NOTE — PROGRESS NOTES
[facility]   Clinic Note              Patient Name: Catalino Mcfadden  Age: 58 y.o.  Sex: male  MRN: [id number]    TODAY'S DATE:  8/28/2023  REFERRING PROVIDER:  No ref. provider found     Diagnosis:   1. Ineffective esophageal motility    2. Moderate protein-calorie malnutrition          HPI:  Catalino Mcfadden is a 58 y.o. male with a history of HTN, HLD, obesity, NIDDM, ESRD on HD who was referred for evaluation and treatment of hiccups.    For review, patient has been seen by various providers at [facility] in the past year, most recently Dr. Yang last month.  At that time it was noted that patient was suffering from chronic belching as well as dysphagia to solids.  Recommendation was made to continue previously prescribed pantoprazole BID and baclofen TID and proceed with EGD.  This was done later that month with results as below; also was seen in ER and given reglan 5mg BID for hiccups.    Today patient notes persistent hiccups, and is hiccuping during the entirety of our interview.  He has taken Protonix, also trialed over-the-counter Prilosec in the past without effect.  He has taken Reglan without effect.  At present gabapentin has not helped.    Interim history:  Since last being seen, multiple [employer] messages have been exchanged.  When last discussed recommendation was made to increase bethanechol to 40mg TID.  This resulted in drooling, but no improvement in belching.  He does feel that the belching is somewhat associated with trouble breathing.      05/15/23:  Impression:     Abnormal examination with delayed gastric emptying suggestive of gastroparesis.  4 hour gastric retention is 26% (normal less than or equal to 10%.    03/28/23:  Impression:            Ineffective esophageal motility due to his 80%                          fragmented swallows based on the Swain                          Classification of Esophageal Motility Disorders     PRIOR ENDOSCOPY:  -Colonoscopy: --    -EGD -  12/16/23:  Impression:            - Normal esophagus. Biopsied.                          - A medium amount of food (residue) in the stomach.                          - Normal stomach. Biopsied.                          - Normal examined duodenum.   Recommendation:        - Discharge patient to home.                          - Resume previous diet.                          - Continue present medications.                          - Await pathology results.                          - Return to GI clinic at appointment to be                          scheduled.   Path:  1. Gastric, biopsy:   Gastric mucosa with mild chronic gastritis   No Helicobacter pylori organisms identified on routine stain   2. Distal esophagus, biopsy:   Gastroesophageal junctional mucosa with mild chronic inflammation   No intraepithelial eosinophils identified in the squamous component   Negative for intestinal metaplasia, dysplasia, or malignancy      CT Chest, 03/22/23:  mpression:     Low lung volumes, pericardial effusion, atherosclerosis and tracheobronchial malacia.     Upper lobe predominant Alicia lymphatic and peribronchovascular micro nodular lung opacities and abnormal appearance of the liver.  Findings may reflect metastatic disease although an infectious/inflammatory etiology such as granulomatous disease/sarcoidosis could have this appearance.  Further workup is warranted.    ROS: Negative other than above      Review of patient's allergies indicates:  No Known Allergies    Outpatient Medications Marked as Taking for the 8/28/23 encounter (Office Visit) with Eren Francois MD   Medication Sig Dispense Refill    dulaglutide (TRULICITY) 0.75 mg/0.5 mL pen injector INJECT 0.5 ML UNDER THE SKIN EVERY 7 DAYS 12 pen 0    esomeprazole (NEXIUM) 40 MG capsule Take 1 capsule (40 mg total) by mouth 2 (two) times daily before meals. 60 capsule 11    gabapentin (NEURONTIN) 100 MG capsule Take 1 capsule (100 mg total) by mouth 3 (three)  times daily. 90 capsule 11    heparin sod,pork in 0.45% NaCl (HEPARIN, PORCINE, 100 UNITS) 100 unit/100 mL (1 unit/mL) SolP in sodium chloride 0.45 % 100 mL infusion Heparin Sodium (Porcine) 1,000 Units/mL Systemic      NIFEdipine (PROCARDIA XL) 90 MG (OSM) 24 hr tablet Take 1 tablet (90 mg total) by mouth once daily. 90 tablet 3    ondansetron (ZOFRAN-ODT) 4 MG TbDL Take 1 tablet (4 mg total) by mouth every 6 (six) hours as needed (nausea and vomiting). 30 tablet 3    pantoprazole (PROTONIX) 40 MG tablet Take 1 tablet by mouth.      promethazine (PHENERGAN) 12.5 MG Tab Take 1 tablet (12.5 mg total) by mouth every 6 (six) hours as needed (nausea and vomiting). 30 tablet 3    sucroferric oxyhydroxide (VELPHORO) 500 mg Chew Take 1 tablet by mouth.      torsemide (DEMADEX) 20 MG Tab Take 1 tablet (20 mg total) by mouth once daily. Take once a day on non-HD days 90 tablet 3    [DISCONTINUED] bethanechol (URECHOLINE) 10 MG Tab Take 1 tablet (10 mg total) by mouth 3 (three) times daily. 90 tablet 11       Past Medical History:   Diagnosis Date    Diabetes mellitus, type 2     Diabetic foot ulcer associated with diabetes mellitus due to underlying condition 07/16/2020    ESRD on hemodialysis     Hyperlipidemia     Hypertension     Obese        Past Surgical History:   Procedure Laterality Date    AV FISTULA PLACEMENT Left 7/6/2023    Procedure: CREATION, AV FISTULA;  Surgeon: Daniel Poon MD;  Location: [facility] OR;  Service: Vascular;  Laterality: Left;  RN PREOP 6/28/2023  LABS DAY OF SURGERY    BONE BIOPSY Left 7/17/2020    Procedure: BIOPSY, BONE;  Surgeon: Verona Whitmore DPM;  Location: [facility] OR;  Service: Podiatry;  Laterality: Left;    CERVICAL DISC SURGERY  07/11/2016    DEBRIDEMENT Left 7/17/2020    Procedure: DEBRIDEMENT;  Surgeon: Verona Whitmore DPM;  Location: [facility] OR;  Service: Podiatry;  Laterality: Left;    DEBRIDEMENT OF FOOT Right     toes & plantar surface    ESOPHAGEAL MANOMETRY WITH MEASUREMENT  OF IMPEDANCE N/A 3/27/2023    Procedure: MANOMETRY, ESOPHAGUS, WITH IMPEDANCE MEASUREMENT;  Surgeon: Roopa Pérez MD;  [facility]  Service: Endoscopy;  Laterality: N/A;  Belching and rumination protocol please  instructions via portal - sm    ESOPHAGOGASTRODUODENOSCOPY N/A 12/16/2022    Procedure: EGD (ESOPHAGOGASTRODUODENOSCOPY);  Surgeon: Eren Francois MD;  Location: [facility];  Service: Endoscopy;  Laterality: N/A;  Pt. on Dialysis. K+ ordered prior to procedure.EC    FRACTURE SURGERY Right     medial ankle, metal plate present    INSERTION OF TUNNELED CENTRAL VENOUS CATHETER (CVC) WITH SUBCUTANEOUS PORT N/A 6/11/2021    Procedure: TUNNEL CATH INSERTION WITHOUT PORT;  Surgeon: Jose Manuel Diagnostic Provider;  Location: [facility] OR;  Service: Radiology;  Laterality: N/A;  11AM START---PHONE PREOP 6/10/21---COVID POSTIVE ON 7/2020---NO S/S    left leg orthopedic surgery Left        Family History   Problem Relation Age of Onset    Heart disease Father     Colon cancer Neg Hx     Esophageal cancer Neg Hx        Social History     Socioeconomic History    Marital status: Other    Number of children: 1   Tobacco Use    Smoking status: Never    Smokeless tobacco: Never   Substance and Sexual Activity    Alcohol use: Yes     Comment: social    Drug use: No    Sexual activity: Not Currently   Social History Narrative    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     Social Determinants of Health     Financial Resource Strain: Low Risk  (9/26/2022)    Overall Financial Resource Strain (CARDIA)     Difficulty of Paying Living Expenses: Not very hard   Food Insecurity: No Food Insecurity (9/26/2022)    Hunger Vital Sign     Worried About Running Out of Food in the Last Year: Never true     Ran Out of Food in the Last Year: Never true   Transportation Needs: No Transportation Needs (9/26/2022)    PRAPARE - Transportation     Lack of Transportation (Medical): No     Lack of Transportation (Non-Medical): No   Physical  "Activity: Unknown (9/26/2022)    Exercise Vital Sign     Days of Exercise per Week: Patient refused     Minutes of Exercise per Session: Patient refused   Stress: Stress Concern Present (9/26/2022)    [place] [employer] - Occupational Stress Questionnaire     Feeling of Stress : To some extent   Social Connections: Moderately Integrated (9/26/2022)    Social Connection and Isolation Panel [NHANES]     Frequency of Communication with Friends and Family: Three times a week     Frequency of Social Gatherings with Friends and Family: Three times a week     Attends Spiritism Services: 1 to 4 times per year     Active Member of Clubs or Organizations: No     Attends Club or Organization Meetings: Never     Marital Status:    Housing Stability: Low Risk  (9/26/2022)    Housing Stability Vital Sign     Unable to Pay for Housing in the Last Year: No     Number of Places Lived in the Last Year: 1     Unstable Housing in the Last Year: No         Vital Signs:  BP (!) 157/74   Pulse 74   Ht 6' 1" (1.854 m)   Wt 102 kg (224 lb 15.7 oz)   BMI 29.68 kg/m²      General: Awoke and orientedx3, in no acute distress  HEENT: Normocephalic, atruamatic, Moist mucous membranes  CV: Regular rate and rhythm, no JVD  Pulm: Normal inspiratory effort, no audible wheezing  Abdomen: Soft, nontender, nondistended abdomen without rebound or guarding  Extremities: No clubbing, cyanosis or edema  Psych: Normal affect. Good eye contact     Labs:   Lab Results   Component Value Date    CRP 8.5 (H) 07/23/2021     Lab Results   Component Value Date    HEPBSAG Negative 02/23/2022    HEPBCAB Negative 02/23/2022     Lab Results   Component Value Date    TBGOLDPLUS Negative 02/23/2022     Lab Results   Component Value Date    JAQLEVFN28RR 12 (L) 02/09/2022     Lab Results   Component Value Date    WBC 5.72 07/06/2023    HGB 10.4 (L) 07/06/2023    HCT 32.3 (L) 07/06/2023    MCV 88 07/06/2023     (L) 07/06/2023     Lab " Results   Component Value Date    CREATININE 3.9 (H) 07/06/2023    ALBUMIN 3.1 (L) 07/06/2023    BILITOT 0.8 07/06/2023    ALKPHOS 372 (H) 07/06/2023    AST 32 07/06/2023    ALT 26 07/06/2023       Assessment/Plan:  Catalino Mcfadden is a 58 y.o. male with a history of HTN, HLD, obesity, NIDDM, ESRD on HD who was referred for evaluation and treatment of hiccups.    # Ineffective esophageal motility [K22.4]    Initially seen for 6 months of persistent hiccups/regurgitation despite trials of PPI, Reglan, gabapentin, and various home remedies.  He has since trialed Thorazine, as well as MMP brain without any improvement of his symptoms.  We were able to obtain manometry which was consistent with ineffective esophageal motility.      Patient has now failed bethanechol dose optimized to 40 mg t.i.d., with side effects of drooling at night.  His belching and sensation poor transit through esophagus has remained.  I discussed stopping bethanechol and changing to TCA, which he was open to.  We will also reach out to pulmonology who appears to have recommended EBUS which has not been done yet    -- Stop Bethanechol; begin TCA with elavil 10mg  -- gastroparesis diet and diaphragmatic breathing  -- discussed motegrity but unclear that we can use with HD  -- will reach out to Dr. Huynh team re EBUS    RTC 3-6 mo    Thank you for involving us in the care of this patient.        Eren Francois MD  Department of Gastroenterology & Hepatology

## 2023-08-28 NOTE — Clinical Note
Hey there, would you mind trying patient back for EBUS with Dr. Huynh? Looks like he was lost to followup.

## 2023-09-05 ENCOUNTER — TELEPHONE (OUTPATIENT)
Dept: ENDOSCOPY | Facility: HOSPITAL | Age: 58
End: 2023-09-05
Payer: COMMERCIAL

## 2023-09-05 VITALS — BODY MASS INDEX: 29.16 KG/M2 | WEIGHT: 220 LBS | HEIGHT: 73 IN

## 2023-09-05 DIAGNOSIS — Z12.11 SCREEN FOR COLON CANCER: Primary | ICD-10-CM

## 2023-09-05 NOTE — TELEPHONE ENCOUNTER
Spoke to Catalino to schedule procedure(s) Colonoscopy       Physician to perform procedure(s) Dr. COLETTE Romero  Date of Procedure (s) 12/15/23  Arrival Time 7:00 AM  Time of Procedure(s) 8:00 AM   Location of Procedure(s) [facility]  Type of Rx Prep sent to patient: PEG  Instructions provided to patient via [facility]    Patient was informed on the following information and verbalized understanding. Screening questionnaire reviewed with patient and complete. If procedure requires anesthesia, a responsible adult needs to be present to accompany the patient home, patient cannot drive after receiving anesthesia. Appointment details are tentative, especially check-in time. Patient will receive a prep-op call 4 days prior to confirm check-in time for procedure. If applicable the patient should contact their pharmacy to verify Rx for procedure prep is ready for pick-up. Patient was advised to call the scheduling department at [phone] if pharmacy states no Rx is available. Patient was advised to call the endoscopy scheduling department if any questions or concerns arise.      [facility] Scheduling Department

## 2023-09-05 NOTE — TELEPHONE ENCOUNTER
"----- Message from Velma Joshi sent at 2023 12:16 PM CDT -----    ----- Message -----  From: Eren Francois MD  Sent: 2023  11:22 AM CDT  To: [facility] Patients    Procedure: Colonoscopy    Diagnosis: Screening colonoscopy    Procedure Timin-6 weeks    #If within 4 weeks selected, please david as high priority#    #If greater than 12 weeks, please select "5-12 weeks" and delay sending until 2 months prior to requested date#     Provider: Any GI provider    Location: No Preference    Additional Scheduling Information: No scheduling concerns    Prep Specifications:Standard prep    Is the patient taking a GLP-1 Agonist:yes    Have you attached a patient to this message: yes       "

## 2023-09-06 ENCOUNTER — TELEPHONE (OUTPATIENT)
Dept: ENDOSCOPY | Facility: HOSPITAL | Age: 58
End: 2023-09-06
Payer: COMMERCIAL

## 2023-09-06 DIAGNOSIS — Z12.11 SCREEN FOR COLON CANCER: Primary | ICD-10-CM

## 2023-09-06 NOTE — TELEPHONE ENCOUNTER
Rec'd message that pt called our office. No answer, left voicemail for pt to call the Endoscopy Scheduling department back.

## 2023-09-21 ENCOUNTER — HOSPITAL ENCOUNTER (OUTPATIENT)
Dept: CARDIOLOGY | Facility: HOSPITAL | Age: 58
Discharge: HOME | End: 2023-09-21
Attending: SURGERY
Payer: COMMERCIAL

## 2023-09-21 DIAGNOSIS — N18.5 TYPE 2 DIABETES MELLITUS WITH STAGE 5 CHRONIC KIDNEY DISEASE: ICD-10-CM

## 2023-09-21 DIAGNOSIS — Z01.818 PREOPERATIVE TESTING: ICD-10-CM

## 2023-09-21 DIAGNOSIS — E11.22 TYPE 2 DIABETES MELLITUS WITH STAGE 5 CHRONIC KIDNEY DISEASE: ICD-10-CM

## 2023-09-21 LAB
HD ANASTAMOSIS VESSEL: NORMAL
HD FISTULA OUTFLOW VEIN VESSEL: NORMAL
HD INFLOW ARTERY VESSEL: NORMAL
RIGHT DIS GRAFT DEPTH: 1.3 CM
RIGHT DIS GRAFT DIA: 0.6 CM
RIGHT DIS GRAFT PSV: 188 CM/ SEC
RIGHT INFLOW PSV: 422 CM/S
RIGHT MID GRAFT DEPTH: 0.4 CM
RIGHT MID GRAFT DIA: 0.7 CM
RIGHT MID GRAFT PSV: 260 CM/S
RIGHT OUTFLOW VEIN PSV: 314 CM/ SEC
RIGHT PROX ANA PSV: 662 CM/S
RIGHT PROX GRAFT DEPTH: 0.4 CM
RIGHT PROX GRAFT DIA: 0.8 CM
RIGHT PROX GRAFT PSV: 207 CM/S
RIGHT VOLUME FLOW DIA: 0.7 CM
RIGHT VOLUME FLOW PSV: 1931 ML/MIN

## 2023-09-21 PROCEDURE — 93990 DOPPLER FLOW TESTING: CPT | Mod: TC

## 2023-09-21 PROCEDURE — 93990 DOPPLER FLOW TESTING: CPT | Mod: 26,,, | Performed by: SURGERY

## 2023-09-21 PROCEDURE — 93990 CV US HEMODIALYSIS ACCESS (CUPID ONLY): ICD-10-PCS | Mod: 26,,, | Performed by: SURGERY

## 2023-09-26 ENCOUNTER — PATIENT MESSAGE (OUTPATIENT)
Dept: VASCULAR SURGERY | Facility: CLINIC | Age: 58
End: 2023-09-26
Payer: COMMERCIAL

## 2023-10-06 ENCOUNTER — TELEPHONE (OUTPATIENT)
Dept: SURGERY | Facility: CLINIC | Age: 58
End: 2023-10-06
Payer: COMMERCIAL

## 2023-10-06 NOTE — TELEPHONE ENCOUNTER
----- Message from Daniel Poon MD sent at 10/5/2023  5:54 PM CDT -----  Regarding: RE: [facility] D[facility] Nurse [phone]  Please call nurse and notify they can use AVF with one needle for 1 week and then small needle x 2 for 1 week with gradual increase in needle size and flow rate.  Please have him f/u in 3 weeks ok to add on  ----- Message -----  From: Loulou Mabry MA  Sent: 9/28/2023  11:27 AM CDT  To: Daniel Poon MD  Subject: FW: [facility] D[facility] Nurse [phone]              ----- Message -----  From: Albaro Yang  Sent: 9/28/2023   9:57 AM CDT  To: [facility] Staff  Subject: [facility] Dialysis Nurse [phone]                Type: Patient Call Back     What is the request in detail:[facility] Dialysis Nurse  is requesting a call back with information on if she can use the AVF for the pt     Can the clinic reply by [facility]? No     Would the patient rather a call back or a response via [facility]? Call back    Best call back number: [phone]    Additional Information:    Thank you.

## 2023-10-06 NOTE — TELEPHONE ENCOUNTER
I spoke with Chayito / Dialysis nurse .     I informed her per Dr. Poon: Please call nurse and notify they can use AVF with one needle for 1 week and then small needle x 2 for 1 week with gradual increase in needle size and flow rate.    Patient scheduled for follow up on 10/17/23 with Dr. Poon.

## 2023-10-10 ENCOUNTER — TELEPHONE (OUTPATIENT)
Dept: PULMONOLOGY | Facility: CLINIC | Age: 58
End: 2023-10-10
Payer: COMMERCIAL

## 2023-10-10 NOTE — TELEPHONE ENCOUNTER
Placed a call to the pt in regards to making an appointment for the pt to discuss EBUS. Left a message for the pt to call back.   [Joint Pain] : joint pain [Negative] : Heme/Lymph

## 2023-10-10 NOTE — TELEPHONE ENCOUNTER
----- Message from Kianna Barron sent at 8/29/2023  4:49 PM CDT -----  Regarding: EBUS  Good afternoon ma'am. Can you please reach out to patient to schedule an established patient appointment with Dr. Huynh to discuss EBUS.   ----- Message -----  From: Eren Francois MD  Sent: 8/28/2023  11:24 AM CDT  To: Kianna Barron    Hey there, would you mind trying patient back for EBUS with Dr. Huynh? Looks like he was lost to followup.

## 2023-10-10 NOTE — TELEPHONE ENCOUNTER
Placed a call to the pt in regards to making an appointment with Dr. Huynh. Pt is in the hospital, left message for the pt to call back.

## 2023-10-11 ENCOUNTER — OFFICE VISIT (OUTPATIENT)
Dept: DERMATOLOGY | Facility: CLINIC | Age: 58
End: 2023-10-11
Payer: COMMERCIAL

## 2023-10-11 ENCOUNTER — OFFICE VISIT (OUTPATIENT)
Dept: VASCULAR SURGERY | Facility: CLINIC | Age: 58
End: 2023-10-11
Payer: COMMERCIAL

## 2023-10-11 DIAGNOSIS — Z99.2 ARTERIOVENOUS FISTULA FOR HEMODIALYSIS IN PLACE, PRIMARY: Primary | ICD-10-CM

## 2023-10-11 DIAGNOSIS — D48.5 NEOPLASM OF UNCERTAIN BEHAVIOR OF SKIN: Primary | ICD-10-CM

## 2023-10-11 PROCEDURE — 11102 PR TANGENTIAL BIOPSY, SKIN, SINGLE LESION: ICD-10-PCS | Mod: S$GLB,,, | Performed by: STUDENT IN AN ORGANIZED HEALTH CARE EDUCATION/TRAINING PROGRAM

## 2023-10-11 PROCEDURE — 88305 TISSUE EXAM BY PATHOLOGIST: CPT | Mod: 26,,, | Performed by: PATHOLOGY

## 2023-10-11 PROCEDURE — 99214 OFFICE O/P EST MOD 30 MIN: CPT | Mod: S$GLB,,, | Performed by: SURGERY

## 2023-10-11 PROCEDURE — 11102 TANGNTL BX SKIN SINGLE LES: CPT | Mod: S$GLB,,, | Performed by: STUDENT IN AN ORGANIZED HEALTH CARE EDUCATION/TRAINING PROGRAM

## 2023-10-11 PROCEDURE — 88305 TISSUE EXAM BY PATHOLOGIST: ICD-10-PCS | Mod: 26,,, | Performed by: PATHOLOGY

## 2023-10-11 PROCEDURE — 88305 TISSUE EXAM BY PATHOLOGIST: CPT | Performed by: PATHOLOGY

## 2023-10-11 PROCEDURE — 99499 UNLISTED E&M SERVICE: CPT | Mod: S$GLB,,, | Performed by: STUDENT IN AN ORGANIZED HEALTH CARE EDUCATION/TRAINING PROGRAM

## 2023-10-11 PROCEDURE — 99499 NO LOS: ICD-10-PCS | Mod: S$GLB,,, | Performed by: STUDENT IN AN ORGANIZED HEALTH CARE EDUCATION/TRAINING PROGRAM

## 2023-10-11 PROCEDURE — 99214 PR OFFICE/OUTPT VISIT, EST, LEVL IV, 30-39 MIN: ICD-10-PCS | Mod: S$GLB,,, | Performed by: SURGERY

## 2023-10-11 NOTE — PROGRESS NOTES
Subjective:      Patient ID:  Catalino Mcfadden is a 58 y.o. male who presents for   Chief Complaint   Patient presents with    Spot     chin     New Patient    Patient coming in today for a spot on left chin. States that when it gets irritated it grows and the it goes back down. States that it has been nicked off when shaving.     Derm Hx  Denies Phx NMSC  Denies Fhx MM (adopted)     Spot      Review of Systems   Constitutional:  Negative for fever, chills and fatigue.   Respiratory:  Negative for cough and shortness of breath.    Gastrointestinal:  Negative for nausea, vomiting and diarrhea.   Skin:  Negative for daily sunscreen use, activity-related sunscreen use and wears hat.       Objective:   Physical Exam   Constitutional: He appears well-developed and well-nourished.   Neurological: He is alert and oriented to person, place, and time.   Psychiatric: He has a normal mood and affect.   Skin:   Areas Examined (abnormalities noted in diagram):   Head / Face Inspection Performed            Diagram Legend     Erythematous scaling macule/papule c/w actinic keratosis       Vascular papule c/w angioma      Pigmented verrucoid papule/plaque c/w seborrheic keratosis      Yellow umbilicated papule c/w sebaceous hyperplasia      Irregularly shaped tan macule c/w lentigo     1-2 mm smooth white papules consistent with Milia      Movable subcutaneous cyst with punctum c/w epidermal inclusion cyst      Subcutaneous movable cyst c/w pilar cyst      Firm pink to brown papule c/w dermatofibroma      Pedunculated fleshy papule(s) c/w skin tag(s)      Evenly pigmented macule c/w junctional nevus     Mildly variegated pigmented, slightly irregular-bordered macule c/w mildly atypical nevus      Flesh colored to evenly pigmented papule c/w intradermal nevus       Pink pearly papule/plaque c/w basal cell carcinoma      Erythematous hyperkeratotic cursted plaque c/w SCC      Surgical scar with no sign of skin cancer recurrence      Open  and closed comedones      Inflammatory papules and pustules      Verrucoid papule consistent consistent with wart     Erythematous eczematous patches and plaques     Dystrophic onycholytic nail with subungual debris c/w onychomycosis     Umbilicated papule    Erythematous-base heme-crusted tan verrucoid plaque consistent with inflamed seborrheic keratosis     Erythematous Silvery Scaling Plaque c/w Psoriasis     See annotation        Assessment / Plan:      Pathology Orders:       Normal Orders This Visit    Specimen to Pathology, Dermatology     Questions:    Procedure Type: Dermatology and skin neoplasms    Number of Specimens: 1    ------------------------: -------------------------    Spec 1 Procedure: Biopsy    Spec 1 Clinical Impression: wart vs other    Spec 1 Source: left chin    Release to patient:           Neoplasm of uncertain behavior of skin  -     Specimen to Pathology, Dermatology  Shave biopsy procedure note:    Shave biopsy performed after verbal consent including risk of infection, scar, recurrence, need for additional treatment of site. Area prepped with alcohol, anesthetized with approximately 1.0cc of 2% lidocaine with epinephrine. Lesional tissue shaved with razor blade. Hemostasis achieved with application of aluminum chloride followed by hyfrecation. No complications. Dressing applied. Wound care explained.             No follow-ups on file.

## 2023-10-11 NOTE — PATIENT INSTRUCTIONS
Shave Biopsy Wound Care    Your doctor has performed a shave biopsy today.  A band aid and vaseline ointment has been placed over the site.  This should remain in place for 24 hours.  It is recommended that you keep the area dry for the first 24 hours.  After 24 hours, you may remove the band aid and wash the area with warm soap and water and apply Vaseline jelly.  Many patients prefer to use Neosporin or Bacitracin ointment.  This is acceptable; however, know that you can develop an allergy to this medication even if you have used it safely for years.  It is important to keep the area moist.  Letting it dry out and get air slows healing time, and will worsen the scar.  Band aid is optional after first 24 hours.      If you notice increasing redness, tenderness, pain, or yellow drainage at the biopsy site, please notify your doctor.  These are signs of an infection.    If your biopsy site is bleeding, apply firm pressure for 15 minutes straight.  Repeat for another 15 minutes, if it is still bleeding.   If the surgical site continues to bleed, then please contact your doctor.      [facility] [place] [address]/ [phone] FAX/ [email or web address]

## 2023-10-11 NOTE — PROGRESS NOTES
Daniel Poon MD [facility]                         10/11/2023    HPI:  Catalino Mcfadden is a 58 y.o. male with   Patient Active Problem List   Diagnosis    Type 2 diabetes mellitus with stage 5 chronic kidney disease    Essential hypertension    Hyperlipidemia, acquired    ED (erectile dysfunction)    History of diabetic ulcer of foot    Osteomyelitis of second toe of left foot    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    Long term (current) use of antibiotics    Acute osteomyelitis of metatarsal bone of left foot    Diabetic ulcer of toe of left foot    Hyponatremia    Osteomyelitis of left foot    Hypoalbuminemia    Healed ulcer of left foot on examination    Iron deficiency anemia    Metabolic acidosis    Anemia due to chronic kidney disease, on chronic dialysis    Diabetic ulcer of left midfoot associated with type 2 diabetes mellitus, with fat layer exposed    Type II diabetes mellitus with neurological manifestations    Foot ulcer, right, with fat layer exposed    Foot ulceration, left, with fat layer exposed    ESRD on hemodialysis    Malignant renovascular hypertension    Calcified granuloma of lung    Abnormal findings on diagnostic imaging of lung    Tracheobronchomalacia    being managed by PCP and specialists who is in need for long term dialysis access.  Patient is R handed.  Has been on HD for months.  No previous access surgery.  No defibrillator or pacemaker.  No recent UTI.    No extremity pain and swelling.     no MI  no Stroke  yes DM  yes HTN  no Lupus  no nephropathy  Tobacco use: denies    6/2023: Presents for f/u.  No new issues.  L foot wound has healed.    8/2023:  s/p SANGEETHA BC AVF 7/6/23.  Recovered well.  No HD US today.    10/2023:  began using AVF one needle x 1 successfully    Past Medical History:   Diagnosis Date    Diabetes mellitus, type 2     Diabetic  foot ulcer associated with diabetes mellitus due to underlying condition 07/16/2020    ESRD on hemodialysis     Hyperlipidemia     Hypertension     Obese      Past Surgical History:   Procedure Laterality Date    AV FISTULA PLACEMENT Left 7/6/2023    Procedure: CREATION, AV FISTULA;  Surgeon: Daniel Poon MD;  Location: [facility] OR;  Service: Vascular;  Laterality: Left;  RN PREOP 6/28/2023  LABS DAY OF SURGERY    BONE BIOPSY Left 7/17/2020    Procedure: BIOPSY, BONE;  Surgeon: Verona Whitmore DPM;  Location: [facility] OR;  Service: Podiatry;  Laterality: Left;    CERVICAL DISC SURGERY  07/11/2016    DEBRIDEMENT Left 7/17/2020    Procedure: DEBRIDEMENT;  Surgeon: Verona Whitmore DPM;  Location: [facility] OR;  Service: Podiatry;  Laterality: Left;    DEBRIDEMENT OF FOOT Right     toes & plantar surface    ESOPHAGEAL MANOMETRY WITH MEASUREMENT OF IMPEDANCE N/A 3/27/2023    Procedure: MANOMETRY, ESOPHAGUS, WITH IMPEDANCE MEASUREMENT;  Surgeon: Roopa Pérez MD;  Location: [facility] (4TH FLR);  Service: Endoscopy;  Laterality: N/A;  Belching and rumination protocol please  instructions via portal - sm    ESOPHAGOGASTRODUODENOSCOPY N/A 12/16/2022    Procedure: EGD (ESOPHAGOGASTRODUODENOSCOPY);  Surgeon: Eren Francois MD;  Location: [facility];  Service: Endoscopy;  Laterality: N/A;  Pt. on Dialysis. K+ ordered prior to procedure.EC    FRACTURE SURGERY Right     medial ankle, metal plate present    INSERTION OF TUNNELED CENTRAL VENOUS CATHETER (CVC) WITH SUBCUTANEOUS PORT N/A 6/11/2021    Procedure: TUNNEL CATH INSERTION WITHOUT PORT;  Surgeon: Jose Manuel Diagnostic Provider;  Location: [facility] OR;  Service: Radiology;  Laterality: N/A;  11AM START---PHONE PREOP 6/10/21---COVID POSTIVE ON 7/2020---NO S/S    left leg orthopedic surgery Left      Family History   Problem Relation Age of Onset    Heart disease Father     Colon cancer Neg Hx     Esophageal cancer Neg Hx      Social History     Socioeconomic History    Marital  status: Other    Number of children: 1   Tobacco Use    Smoking status: Never    Smokeless tobacco: Never   Substance and Sexual Activity    Alcohol use: Yes     Comment: social    Drug use: No    Sexual activity: Not Currently   Social History Narrative    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     Social Determinants of Health     Financial Resource Strain: Low Risk  (9/26/2022)    Overall Financial Resource Strain (CARDIA)     Difficulty of Paying Living Expenses: Not very hard   Food Insecurity: No Food Insecurity (9/26/2022)    Hunger Vital Sign     Worried About Running Out of Food in the Last Year: Never true     Ran Out of Food in the Last Year: Never true   Transportation Needs: No Transportation Needs (9/26/2022)    PRAPARE - Transportation     Lack of Transportation (Medical): No     Lack of Transportation (Non-Medical): No   Physical Activity: Unknown (9/26/2022)    Exercise Vital Sign     Days of Exercise per Week: Patient refused     Minutes of Exercise per Session: Patient refused   Stress: Stress Concern Present (9/26/2022)    [place] [employer] - Occupational Stress Questionnaire     Feeling of Stress : To some extent   Social Connections: Moderately Integrated (9/26/2022)    Social Connection and Isolation Panel [NHANES]     Frequency of Communication with Friends and Family: Three times a week     Frequency of Social Gatherings with Friends and Family: Three times a week     Attends Mu-ism Services: 1 to 4 times per year     Active Member of Clubs or Organizations: No     Attends Club or Organization Meetings: Never     Marital Status:    Housing Stability: Low Risk  (9/26/2022)    Housing Stability Vital Sign     Unable to Pay for Housing in the Last Year: No     Number of Places Lived in the Last Year: 1     Unstable Housing in the Last Year: No       Current Outpatient Medications:     amitriptyline (ELAVIL) 10 MG tablet, Take 1 tablet (10 mg total) by mouth nightly as needed  for Pain. (Patient not taking: Reported on 10/11/2023), Disp: 30 each, Rfl: 11    aspirin 81 MG Chew, Take 1 tablet (81 mg total) by mouth once daily., Disp: 30 tablet, Rfl: 0    atorvastatin (LIPITOR) 40 MG tablet, Take 1 tablet (40 mg total) by mouth once daily., Disp: 30 tablet, Rfl: 0    dulaglutide (TRULICITY) 0.75 mg/0.5 mL pen injector, INJECT 0.5 ML UNDER THE SKIN EVERY 7 DAYS (Patient not taking: Reported on 10/11/2023), Disp: 12 pen, Rfl: 0    esomeprazole (NEXIUM) 40 MG capsule, Take 1 capsule (40 mg total) by mouth 2 (two) times daily before meals. (Patient not taking: Reported on 10/11/2023), Disp: 60 capsule, Rfl: 11    gabapentin (NEURONTIN) 100 MG capsule, Take 1 capsule (100 mg total) by mouth 3 (three) times daily. (Patient not taking: Reported on 10/11/2023), Disp: 90 capsule, Rfl: 11    heparin sod,pork in 0.45% NaCl (HEPARIN, PORCINE, 100 UNITS) 100 unit/100 mL (1 unit/mL) SolP in sodium chloride 0.45 % 100 mL infusion, Heparin Sodium (Porcine) 1,000 Units/mL Systemic, Disp: , Rfl:     hydrALAZINE (APRESOLINE) 100 MG tablet, Take 1 tablet (100 mg total) by mouth 3 (three) times daily., Disp: 90 tablet, Rfl: 0    NIFEdipine (PROCARDIA XL) 90 MG (OSM) 24 hr tablet, Take 1 tablet (90 mg total) by mouth once daily., Disp: 90 tablet, Rfl: 3    ondansetron (ZOFRAN-ODT) 4 MG TbDL, Take 1 tablet (4 mg total) by mouth every 6 (six) hours as needed (nausea and vomiting). (Patient not taking: Reported on 10/11/2023), Disp: 30 tablet, Rfl: 3    pantoprazole (PROTONIX) 40 MG tablet, Take 1 tablet by mouth., Disp: , Rfl:     promethazine (PHENERGAN) 12.5 MG Tab, Take 1 tablet (12.5 mg total) by mouth every 6 (six) hours as needed (nausea and vomiting). (Patient not taking: Reported on 10/11/2023), Disp: 30 tablet, Rfl: 3    sevelamer carbonate (RENVELA) 800 mg Tab, Take 1 tablet (800 mg total) by mouth 3 (three) times daily with meals., Disp: 90 tablet, Rfl: 11    sucroferric oxyhydroxide (VELPHORO) 500 mg  Chew, Take 1 tablet by mouth., Disp: , Rfl:     torsemide (DEMADEX) 20 MG Tab, Take 1 tablet (20 mg total) by mouth once daily. Take once a day on non-HD days (Patient not taking: Reported on 10/11/2023), Disp: 90 tablet, Rfl: 3    REVIEW OF SYSTEMS:  General: No fevers or chills; ENT: No sore throat; Allergy and Immunology: no persistent infections; Hematological and Lymphatic: No history of bleeding or easy bruising; Endocrine: negative; Respiratory: no cough, shortness of breath, or wheezing; Cardiovascular: no chest pain or dyspnea on exertion; Gastrointestinal: no abdominal pain/back, change in bowel habits, or bloody stools; Genito-Urinary: no dysuria, trouble voiding, or hematuria; Musculoskeletal: negative; Neurological: no TIA or stroke symptoms; Psychiatric: no nervousness, anxiety or depression.    PHYSICAL EXAM:                General appearance:  Alert, well-appearing, and in no distress.  Oriented to person, place, and time                    Neurological: Normal speech, no focal findings noted; CN II - XII grossly intact. BUE with sensation to light touch.            Musculoskeletal: Digits/nail without cyanosis/clubbing.  Strength 5/5 BUE.                    Neck: Supple, no significant adenopathy, no carotid bruit can be auscultated                  Chest:  Clear to auscultation, no wheezes, rales or rhonchi, symmetric air entry. No use of accessory muscles               Cardiac: Normal rate and regular rhythm, S1 and S2 normal            Abdomen: Soft, nontender, nondistended, no masses or organomegaly, no hernia     No rebound tenderness noted; bowel sounds normal     Pulsatile aortic mass is non palpable.     No groin adenopathy      Extremities:   2 + R radial pulse, 2 + L radial pulse     2 + R brachial pulse, 2 + L brachial pulse     no RUE edema, no LUE edema     neg Dutch's test RUE, neg Dutch's test LUE, +thrill LUE AVF, inc well healed    Skin: No tissue loss    LAB RESULTS:  No results  "found for: "CBC"  Lab Results   Component Value Date    LABPROT 11.4 06/28/2023    INR 1.1 06/28/2023     Lab Results   Component Value Date     (L) 07/06/2023    K 3.6 07/06/2023    CL 95 07/06/2023    CO2 26 07/06/2023     07/06/2023    BUN 15 07/06/2023    CREATININE 3.9 (H) 07/06/2023    CALCIUM 9.1 07/06/2023    ANIONGAP 10 07/06/2023    EGFRNONAA 9.0 (A) 02/23/2022     Lab Results   Component Value Date    WBC 5.72 07/06/2023    RBC 3.66 (L) 07/06/2023    HGB 10.4 (L) 07/06/2023    HCT 32.3 (L) 07/06/2023    MCV 88 07/06/2023    MCH 28.4 07/06/2023    MCHC 32.2 07/06/2023    RDW 14.5 07/06/2023     (L) 07/06/2023    MPV 9.5 07/06/2023    GRAN 4.1 07/06/2023    GRAN 71.7 07/06/2023    LYMPH 0.7 (L) 07/06/2023    LYMPH 12.1 (L) 07/06/2023    MONO 0.7 07/06/2023    MONO 12.4 07/06/2023    EOS 0.1 07/06/2023    BASO 0.05 07/06/2023    EOSINOPHIL 2.4 07/06/2023    BASOPHIL 0.9 07/06/2023    DIFFMETHOD Automated 07/06/2023     .  Lab Results   Component Value Date    HGBA1C 5.1 02/22/2023       IMAGING:  All pertinent imaging has been reviewed and interpreted independently.    Adequate vein BUE 2022    IMP/PLAN:  58 y.o. male with   Patient Active Problem List   Diagnosis    Type 2 diabetes mellitus with stage 5 chronic kidney disease    Essential hypertension    Hyperlipidemia, acquired    ED (erectile dysfunction)    History of diabetic ulcer of foot    Osteomyelitis of second toe of left foot    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    Long term (current) use of antibiotics    Acute osteomyelitis of metatarsal bone of left foot    Diabetic ulcer of toe of left foot    Hyponatremia    Osteomyelitis of left foot    Hypoalbuminemia    Healed ulcer of left foot on examination    Iron deficiency anemia    Metabolic acidosis    Anemia due to chronic kidney disease, on chronic dialysis    Diabetic ulcer of left midfoot " associated with type 2 diabetes mellitus, with fat layer exposed    Type II diabetes mellitus with neurological manifestations    Foot ulcer, right, with fat layer exposed    Foot ulceration, left, with fat layer exposed    ESRD on hemodialysis    Malignant renovascular hypertension    Calcified granuloma of lung    Abnormal findings on diagnostic imaging of lung    Tracheobronchomalacia    being managed by PCP and specialists who is here today for evaluation of long term HD access.    -cont AVF use per protocol  -RTC 3 weeks for possible catheter removal      I spent 13 minutes evaluating this patient and greater than 50% of the time was spent counseling, coordinator care and discussing the plan of care.  All questions were answered and patient stated understanding with agreement with the above treatment plan.    Daniel Poon MD VI  Vascular and Endovascular Surgery

## 2023-10-16 LAB
FINAL PATHOLOGIC DIAGNOSIS: NORMAL
GROSS: NORMAL
MICROSCOPIC EXAM: NORMAL

## 2023-10-30 ENCOUNTER — OFFICE VISIT (OUTPATIENT)
Dept: VASCULAR SURGERY | Facility: CLINIC | Age: 58
End: 2023-10-30
Payer: COMMERCIAL

## 2023-10-30 ENCOUNTER — TELEPHONE (OUTPATIENT)
Dept: PREADMISSION TESTING | Facility: HOSPITAL | Age: 58
End: 2023-10-30
Payer: COMMERCIAL

## 2023-10-30 DIAGNOSIS — Z99.2 ARTERIOVENOUS FISTULA FOR HEMODIALYSIS IN PLACE, PRIMARY: Primary | ICD-10-CM

## 2023-10-30 PROCEDURE — 99999 PR PBB SHADOW E&M-EST. PATIENT-LVL II: CPT | Mod: PBBFAC,,, | Performed by: SURGERY

## 2023-10-30 PROCEDURE — 99214 PR OFFICE/OUTPT VISIT, EST, LEVL IV, 30-39 MIN: ICD-10-PCS | Mod: S$GLB,,, | Performed by: SURGERY

## 2023-10-30 PROCEDURE — 99999 PR PBB SHADOW E&M-EST. PATIENT-LVL II: ICD-10-PCS | Mod: PBBFAC,,, | Performed by: SURGERY

## 2023-10-30 PROCEDURE — 99212 OFFICE O/P EST SF 10 MIN: CPT | Mod: PBBFAC | Performed by: SURGERY

## 2023-10-30 PROCEDURE — 99214 OFFICE O/P EST MOD 30 MIN: CPT | Mod: S$GLB,,, | Performed by: SURGERY

## 2023-10-30 NOTE — PROGRESS NOTES
Daniel Poon MD [facility]                         10/30/2023    HPI:  Catalino Mcfadden is a 58 y.o. male with   Patient Active Problem List   Diagnosis    Type 2 diabetes mellitus with stage 5 chronic kidney disease    Essential hypertension    Hyperlipidemia, acquired    ED (erectile dysfunction)    History of diabetic ulcer of foot    Osteomyelitis of second toe of left foot    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    Long term (current) use of antibiotics    Acute osteomyelitis of metatarsal bone of left foot    Diabetic ulcer of toe of left foot    Hyponatremia    Osteomyelitis of left foot    Hypoalbuminemia    Healed ulcer of left foot on examination    Iron deficiency anemia    Metabolic acidosis    Anemia due to chronic kidney disease, on chronic dialysis    Diabetic ulcer of left midfoot associated with type 2 diabetes mellitus, with fat layer exposed    Type II diabetes mellitus with neurological manifestations    Foot ulcer, right, with fat layer exposed    Foot ulceration, left, with fat layer exposed    ESRD on hemodialysis    Malignant renovascular hypertension    Calcified granuloma of lung    Abnormal findings on diagnostic imaging of lung    Tracheobronchomalacia    being managed by PCP and specialists who is in need for long term dialysis access.  Patient is R handed.  Has been on HD for months.  No previous access surgery.  No defibrillator or pacemaker.  No recent UTI.    No extremity pain and swelling.     no MI  no Stroke  yes DM  yes HTN  no Lupus  no nephropathy  Tobacco use: denies    6/2023: Presents for f/u.  No new issues.  L foot wound has healed.    8/2023:  s/p SANGEETHA BC AVF 7/6/23.  Recovered well.  No HD US today.    10/2023:  began using AVF one needle x 1 successfully    10/2023:  DIFFICULTY WITH ACCESS.    Past Medical History:   Diagnosis Date     Diabetes mellitus, type 2     Diabetic foot ulcer associated with diabetes mellitus due to underlying condition 07/16/2020    ESRD on hemodialysis     Hyperlipidemia     Hypertension     Obese      Past Surgical History:   Procedure Laterality Date    AV FISTULA PLACEMENT Left 7/6/2023    Procedure: CREATION, AV FISTULA;  Surgeon: Daniel Poon MD;  Location: [facility];  Service: Vascular;  Laterality: Left;  RN PREOP 6/28/2023  LABS DAY OF SURGERY    BONE BIOPSY Left 7/17/2020    Procedure: BIOPSY, BONE;  Surgeon: Verona Whitmore DPM;  Location: [facility] OR;  Service: Podiatry;  Laterality: Left;    CERVICAL DISC SURGERY  07/11/2016    DEBRIDEMENT Left 7/17/2020    Procedure: DEBRIDEMENT;  Surgeon: Verona Whitmore DPM;  Location: [facility] OR;  Service: Podiatry;  Laterality: Left;    DEBRIDEMENT OF FOOT Right     toes & plantar surface    ESOPHAGEAL MANOMETRY WITH MEASUREMENT OF IMPEDANCE N/A 3/27/2023    Procedure: MANOMETRY, ESOPHAGUS, WITH IMPEDANCE MEASUREMENT;  Surgeon: Roopa Pérez MD;  Location: [facility] ([facility] FLR);  Service: Endoscopy;  Laterality: N/A;  Belching and rumination protocol please  instructions via portal - sm    ESOPHAGOGASTRODUODENOSCOPY N/A 12/16/2022    Procedure: EGD (ESOPHAGOGASTRODUODENOSCOPY);  Surgeon: Eren Francois MD;  Location: [facility];  Service: Endoscopy;  Laterality: N/A;  Pt. on Dialysis. K+ ordered prior to procedure.EC    FRACTURE SURGERY Right     medial ankle, metal plate present    INSERTION OF TUNNELED CENTRAL VENOUS CATHETER (CVC) WITH SUBCUTANEOUS PORT N/A 6/11/2021    Procedure: TUNNEL CATH INSERTION WITHOUT PORT;  Surgeon: Jose Manuel Diagnostic Provider;  Location: [facility];  Service: Radiology;  Laterality: N/A;  11AM START---PHONE PREOP 6/10/21---COVID POSTIVE ON 7/2020---NO S/S    left leg orthopedic surgery Left      Family History   Problem Relation Age of Onset    Heart disease Father     Colon cancer Neg Hx     Esophageal cancer Neg Hx      Social History      Socioeconomic History    Marital status: Other    Number of children: 1   Tobacco Use    Smoking status: Never    Smokeless tobacco: Never   Substance and Sexual Activity    Alcohol use: Yes     Comment: social    Drug use: No    Sexual activity: Not Currently   Social History Narrative    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     Social Determinants of Health     Financial Resource Strain: Low Risk  (9/26/2022)    Overall Financial Resource Strain (CARDIA)     Difficulty of Paying Living Expenses: Not very hard   Food Insecurity: No Food Insecurity (9/26/2022)    Hunger Vital Sign     Worried About Running Out of Food in the Last Year: Never true     Ran Out of Food in the Last Year: Never true   Transportation Needs: No Transportation Needs (9/26/2022)    PRAPARE - Transportation     Lack of Transportation (Medical): No     Lack of Transportation (Non-Medical): No   Physical Activity: Unknown (9/26/2022)    Exercise Vital Sign     Days of Exercise per Week: Patient refused     Minutes of Exercise per Session: Patient refused   Stress: Stress Concern Present (9/26/2022)    [employer] - Occupational Stress Questionnaire     Feeling of Stress : To some extent   Social Connections: Moderately Integrated (9/26/2022)    Social Connection and Isolation Panel [NHANES]     Frequency of Communication with Friends and Family: Three times a week     Frequency of Social Gatherings with Friends and Family: Three times a week     Attends Ca[employer] Services: 1 to 4 times per year     Active Member of Clubs or Organizations: No     Attends Club or Organization Meetings: Never     Marital Status:    Housing Stability: Low Risk  (9/26/2022)    Housing Stability Vital Sign     Unable to Pay for Housing in the Last Year: No     Number of Places Lived in the Last Year: 1     Unstable Housing in the Last Year: No       Current Outpatient Medications:     amitriptyline (ELAVIL) 10 MG tablet, Take 1 tablet (10  mg total) by mouth nightly as needed for Pain. (Patient not taking: Reported on 10/11/2023), Disp: 30 each, Rfl: 11    aspirin 81 MG Chew, Take 1 tablet (81 mg total) by mouth once daily., Disp: 30 tablet, Rfl: 0    atorvastatin (LIPITOR) 40 MG tablet, Take 1 tablet (40 mg total) by mouth once daily., Disp: 30 tablet, Rfl: 0    dulaglutide (TRULICITY) 0.75 mg/0.5 mL pen injector, INJECT 0.5 ML UNDER THE SKIN EVERY 7 DAYS (Patient not taking: Reported on 10/11/2023), Disp: 12 pen, Rfl: 0    esomeprazole (NEXIUM) 40 MG capsule, Take 1 capsule (40 mg total) by mouth 2 (two) times daily before meals. (Patient not taking: Reported on 10/11/2023), Disp: 60 capsule, Rfl: 11    gabapentin (NEURONTIN) 100 MG capsule, Take 1 capsule (100 mg total) by mouth 3 (three) times daily. (Patient not taking: Reported on 10/11/2023), Disp: 90 capsule, Rfl: 11    heparin sod,pork in 0.45% NaCl (HEPARIN, PORCINE, 100 UNITS) 100 unit/100 mL (1 unit/mL) SolP in sodium chloride 0.45 % 100 mL infusion, Heparin Sodium (Porcine) 1,000 Units/mL Systemic, Disp: , Rfl:     hydrALAZINE (APRESOLINE) 100 MG tablet, Take 1 tablet (100 mg total) by mouth 3 (three) times daily., Disp: 90 tablet, Rfl: 0    NIFEdipine (PROCARDIA XL) 90 MG (OSM) 24 hr tablet, Take 1 tablet (90 mg total) by mouth once daily., Disp: 90 tablet, Rfl: 3    ondansetron (ZOFRAN-ODT) 4 MG TbDL, Take 1 tablet (4 mg total) by mouth every 6 (six) hours as needed (nausea and vomiting). (Patient not taking: Reported on 10/11/2023), Disp: 30 tablet, Rfl: 3    pantoprazole (PROTONIX) 40 MG tablet, Take 1 tablet by mouth., Disp: , Rfl:     promethazine (PHENERGAN) 12.5 MG Tab, Take 1 tablet (12.5 mg total) by mouth every 6 (six) hours as needed (nausea and vomiting). (Patient not taking: Reported on 10/11/2023), Disp: 30 tablet, Rfl: 3    sevelamer carbonate (RENVELA) 800 mg Tab, Take 1 tablet (800 mg total) by mouth 3 (three) times daily with meals., Disp: 90 tablet, Rfl: 11     sucroferric oxyhydroxide (VELPHORO) 500 mg Chew, Take 1 tablet by mouth., Disp: , Rfl:     torsemide (DEMADEX) 20 MG Tab, Take 1 tablet (20 mg total) by mouth once daily. Take once a day on non-HD days (Patient not taking: Reported on 10/11/2023), Disp: 90 tablet, Rfl: 3    REVIEW OF SYSTEMS:  General: No fevers or chills; ENT: No sore throat; Allergy and Immunology: no persistent infections; Hematological and Lymphatic: No history of bleeding or easy bruising; Endocrine: negative; Respiratory: no cough, shortness of breath, or wheezing; Cardiovascular: no chest pain or dyspnea on exertion; Gastrointestinal: no abdominal pain/back, change in bowel habits, or bloody stools; Genito-Urinary: no dysuria, trouble voiding, or hematuria; Musculoskeletal: negative; Neurological: no TIA or stroke symptoms; Psychiatric: no nervousness, anxiety or depression.    PHYSICAL EXAM:                General appearance:  Alert, well-appearing, and in no distress.  Oriented to person, place, and time                    Neurological: Normal speech, no focal findings noted; CN II - XII grossly intact. BUE with sensation to light touch.            Musculoskeletal: Digits/nail without cyanosis/clubbing.  Strength 5/5 BUE.                    Neck: Supple, no significant adenopathy, no carotid bruit can be auscultated                  Chest:  Clear to auscultation, no wheezes, rales or rhonchi, symmetric air entry. No use of accessory muscles               Cardiac: Normal rate and regular rhythm, S1 and S2 normal            Abdomen: Soft, nontender, nondistended, no masses or organomegaly, no hernia     No rebound tenderness noted; bowel sounds normal     Pulsatile aortic mass is non palpable.     No groin adenopathy      Extremities:   2 + R radial pulse, 2 + L radial pulse     2 + R brachial pulse, 2 + L brachial pulse     no RUE edema, no LUE edema     neg Dutch's test RUE, neg Dutch's test LUE, +thrill LUE AVF, inc well healed    Skin: No  "tissue loss    LAB RESULTS:  No results found for: "CBC"  Lab Results   Component Value Date    LABPROT 11.4 06/28/2023    INR 1.1 06/28/2023     Lab Results   Component Value Date     (L) 07/06/2023    K 3.6 07/06/2023    CL 95 07/06/2023    CO2 26 07/06/2023     07/06/2023    BUN 15 07/06/2023    CREATININE 3.9 (H) 07/06/2023    CALCIUM 9.1 07/06/2023    ANIONGAP 10 07/06/2023    EGFRNONAA 9.0 (A) 02/23/2022     Lab Results   Component Value Date    WBC 5.72 07/06/2023    RBC 3.66 (L) 07/06/2023    HGB 10.4 (L) 07/06/2023    HCT 32.3 (L) 07/06/2023    MCV 88 07/06/2023    MCH 28.4 07/06/2023    MCHC 32.2 07/06/2023    RDW 14.5 07/06/2023     (L) 07/06/2023    MPV 9.5 07/06/2023    GRAN 4.1 07/06/2023    GRAN 71.7 07/06/2023    LYMPH 0.7 (L) 07/06/2023    LYMPH 12.1 (L) 07/06/2023    MONO 0.7 07/06/2023    MONO 12.4 07/06/2023    EOS 0.1 07/06/2023    BASO 0.05 07/06/2023    EOSINOPHIL 2.4 07/06/2023    BASOPHIL 0.9 07/06/2023    DIFFMETHOD Automated 07/06/2023     .  Lab Results   Component Value Date    HGBA1C 5.1 02/22/2023       IMAGING:  All pertinent imaging has been reviewed and interpreted independently.    Adequate vein BUE 2022    Left upper extremity dialysis ultrasound shows no hemodynamically significant stenosis.  Flow is adequate.        IMP/PLAN:  58 y.o. male with   Patient Active Problem List   Diagnosis    Type 2 diabetes mellitus with stage 5 chronic kidney disease    Essential hypertension    Hyperlipidemia, acquired    ED (erectile dysfunction)    History of diabetic ulcer of foot    Osteomyelitis of second toe of left foot    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    Long term (current) use of antibiotics    Acute osteomyelitis of metatarsal bone of left foot    Diabetic ulcer of toe of left foot    Hyponatremia    Osteomyelitis of left foot    Hypoalbuminemia    Healed ulcer of left foot on " examination    Iron deficiency anemia    Metabolic acidosis    Anemia due to chronic kidney disease, on chronic dialysis    Diabetic ulcer of left midfoot associated with type 2 diabetes mellitus, with fat layer exposed    Type II diabetes mellitus with neurological manifestations    Foot ulcer, right, with fat layer exposed    Foot ulceration, left, with fat layer exposed    ESRD on hemodialysis    Malignant renovascular hypertension    Calcified granuloma of lung    Abnormal findings on diagnostic imaging of lung    Tracheobronchomalacia    being managed by PCP and specialists who is here today for evaluation of long term HD access.    -rec LUE AVF superficialization 11/9/23    I spent 12 minutes evaluating this patient and greater than 50% of the time was spent counseling, coordinator care and discussing the plan of care.  All questions were answered and patient stated understanding with agreement with the above treatment plan.    Daniel Poon MD [facility]  Vascular and Endovascular Surgery

## 2023-11-03 ENCOUNTER — HOSPITAL ENCOUNTER (OUTPATIENT)
Dept: PREADMISSION TESTING | Facility: HOSPITAL | Age: 58
Discharge: HOME | End: 2023-11-03
Attending: SURGERY

## 2023-11-14 NOTE — PROGRESS NOTES
Subjective:      Patient ID: Catalino Mcfadden is a 58 y.o. male.    Chief Complaint: Diabetes Mellitus and Nail Care    Catalino is a 58 y.o. male who presents to the clinic for evaluation and treatment of high risk feet. Catalino has a past medical history of Diabetes mellitus, type 2, Diabetic foot ulcer associated with diabetes mellitus due to underlying condition (07/16/2020), ESRD on hemodialysis, Hyperlipidemia, Hypertension, and Obese. Reports new onset left heel blister x several days after a sock rubbed on the left heel. Seen in ED on 11/7/22.     11/16/2022 returns to clinic for follow up of left heel wound.  Seen by my colleague last week, cultures obtained (NGTD) and patient placed on PO abx.  He has kept dressing intact.     11/23/22: F/u left heel ulceration. Presents with calamine coflex left foot.     11/30/22: F/u left heel ulceration. Patient reports going to work this week.     12/14/22: F/u left heel ulceration. Presents in calamine coflex wrap.     12/21/22: F/u left heel ulceration. Presents in calamine coflex wrap.     12/27/22: F/u left heel ulceration. Presents in calamine coflex wrap. No new pedal complaints, continues to work     01/04/23: F/u left heel ulceration. Presents in calamine coflex wrap. No new pedal complaints, continues to work.  Continues to have hiccups      01/11/23: F/u left heel ulceration. Presents in calamine coflex wrap which appears to have padding which shifted medially. No new pedal complaints, continues to work.  Continues to have hiccups    01/18/23: F/u left heel ulceration. Presents in intact calamine coflex wrap. No new pedal complaints.    2/8/23: F/u right foot ulceration. Presents in calamine coflex wrap.     02/15/23: F/u right ulceration. Presents in intact calamine coflex wrap. No new pedal complaints.    3/15/23: F/u right foot ulceration. Presents in calamine coflex wrap.     03/22/23: F/u right ulceration. Presents in intact calamine coflex wrap. No new pedal  complaints.    04/12/23: Returns to clinic for foot inspection following achieving status of healed to bilateral foot wounds.  He has been attempting to care for feet as instructed. 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 Present in NB tennis shoes.  States he became a vegan on 04/04/2023. No new pedal complaints.    06/21/2023 patient returns to clinic for evaluation and treatment of high-risk feet.  He is status post achieving the status of healed to wounds of both feet.  He has been attempting to care the feet as instructed.  He continues to work and attempts to protect the feet in supportive shoes to the best of his ability.  Patient continues to suffer from hiccups.  No new pedal complaints.    07/19/2023 patient returns to clinic for evaluation and treatment of high-risk feet.   He has been attempting to care the feet as instructed. No longer using offloading pads He continues to work and attempts to protect the feet in supportive shoes to the best of his ability.  Patient continues to suffer from hiccups.  No new pedal complaints.    08/23/2023 patient returns to clinic for evaluation treatment of high-risk feet.  He continues to care for feet as instructed.  Recently acquired a new pair of tennis shoes, Denzel 1.  Continues to work for relates he may be looking for new employment in the coming weeks.  Patient relates a new symptom which is loss of balance or feeling like he is losing his equilibrium with walking and with rising.  He denies falls.  He denies nausea, vomiting, fever, chills.  He continues to have hiccups.    11/15/2023 patient returns to clinic for evaluation treatment of high-risk feet.  He continues to care for feet as instructed.   Patient relates that he has become virtually sedentary.  He no longer is employed.  He is living with a friend in [place].  He continues to feel a loss of balance and now also feels weakness to the lower extremities.   He denies falls.  He denies nausea, vomiting, fever, chills.  He continues to have hiccups.    Chief Complaint   Patient presents with    Diabetes Mellitus    Nail Care         Hemoglobin A1C   Date Value Ref Range Status   02/22/2023 5.1 4.0 - 5.6 % Final     Comment:     ADA Screening Guidelines:  5.7-6.4%  Consistent with prediabetes  >or=6.5%  Consistent with diabetes    High levels of fetal hemoglobin interfere with the HbA1C  assay. Heterozygous hemoglobin variants (HbS, HgC, etc)do  not significantly interfere with this assay.   However, presence of multiple variants may affect accuracy.     02/23/2022 5.0 4.0 - 5.6 % Final     Comment:     ADA Screening Guidelines:  5.7-6.4%  Consistent with prediabetes  >or=6.5%  Consistent with diabetes    High levels of fetal hemoglobin interfere with the HbA1C  assay. Heterozygous hemoglobin variants (HbS, HgC, etc)do  not significantly interfere with this assay.   However, presence of multiple variants may affect accuracy.     02/09/2022 5.0 4.0 - 5.6 % Final     Comment:     ADA Screening Guidelines:  5.7-6.4%  Consistent with prediabetes  >or=6.5%  Consistent with diabetes    High levels of fetal hemoglobin interfere with the HbA1C  assay. Heterozygous hemoglobin variants (HbS, HgC, etc)do  not significantly interfere with this assay.   However, presence of multiple variants may affect accuracy.         Review of Systems   Constitutional: Positive for weight loss. Negative for chills.   Cardiovascular:  Negative for chest pain and claudication.   Respiratory:  Negative for cough.    Skin:  Positive for color change, dry skin and nail changes.   Musculoskeletal:  Positive for joint pain and muscle weakness.   Gastrointestinal:  Negative for nausea.   Neurological:  Positive for disturbances in coordination, loss of balance, numbness and paresthesias.   Psychiatric/Behavioral:  The patient is not nervous/anxious.            Objective:      Vitals:    11/15/23 0708   BP:  "(!) 170/80   Weight: 99.8 kg (220 lb 0.3 oz)   Height: 6' 1" (1.854 m)   PainSc: 0-No pain         Physical Exam  Vitals and nursing note reviewed.   Constitutional:       General: He is not in acute distress.     Appearance: He is not toxic-appearing or diaphoretic.      Comments: Pt. is well-developed, well-nourished, appears stated age, in no acute distress, alert and oriented x 3. No evidence of depression, anxiety, or agitation. Calm, cooperative, and communicative. Appropriate interactions and affect.   Cardiovascular:      Pulses:           Dorsalis pedis pulses are 2+ on the right side and 2+ on the left side.        Posterior tibial pulses are 1+ on the right side and 1+ on the left side.      Comments: There is decreased digital hair. Skin is atrophic, slightly hyperpigmented  Pulmonary:      Effort: No respiratory distress.   Musculoskeletal:      Right lower leg: Edema present.      Left lower leg: Edema present.      Right ankle: Swelling present. No tenderness. No lateral malleolus, medial malleolus, AITF ligament, CF ligament or posterior TF ligament tenderness.      Right Achilles Tendon: No defects. Hilliard's test negative.      Left ankle: Swelling present. No tenderness. No lateral malleolus, medial malleolus, AITF ligament, CF ligament or posterior TF ligament tenderness.      Left Achilles Tendon: No defects. Hilliard's test negative.      Right foot: No tenderness or bony tenderness.      Left foot: No tenderness or bony tenderness.      Comments: Decreased stride, station of gait.  apropulsive toe off.  Increased angle and base of gait.    There is equinus deformity bilateral with decreased dorsiflexion at the ankle joint bilateral. No tenderness with compression of heel. Negative tinels sign. Gait analysis reveals excessive pronation through midstance and propulsion with early heel off.     Patient has hammertoes of digits 2-5 bilateral partially reducible     Decreased first MPJ range of " motion both weightbearing and nonweightbearing, no crepitus observed the first MP joint, + dorsal flag sign.     Visible and palpable bunion without pain at dorsomedial 1st metatarsal head right and left.  Hallux abducted right and left partially reducible, tracks laterally without being track bound.  No ecchymosis, erythema, edema, or cardinal signs infection or signs of trauma same foot.    Fat pad atrophy to heels and met heads bilateral    Plantarflexed 1st ray RLE   Lymphadenopathy:      Comments: No lymphatic streaking    Negative lymphadenopathy bilateral popliteal fossa and tarsal tunnel.     Skin:     General: Skin is warm and dry.      Coloration: Skin is not pale.      Findings: Lesion (see wound descriptions below) present. No ecchymosis, laceration or rash.      Nails: There is no clubbing.      Comments: Toenails 1-5 bilaterally discolored/yellowed, dystrophic, brittle with subungual debris.   Superficial abrasion left dorsal lateral foot.    Focal hyperkeratotic lesion consisting entirely of hyperkeratotic tissue without open skin, drainage, pus, fluctuance, malodor, or signs of infection:  left plantar foot.       Neurological:      Sensory: Sensory deficit present.      Comments:  [place] 5.07 monofilamant testing is diminished Kp feet. Decreased/absent vibratory sensation bilateral feet to 128Hz tuning fork.     Paresthesias, and hyperesthesia bilateral feet with no clearly identified trigger or source.           Psychiatric:         Attention and Perception: He is attentive.         Mood and Affect: Mood is depressed. Mood is not anxious. Affect is not inappropriate.         Speech: He is communicative. Speech is not slurred.         Behavior: Behavior is not combative.       11/15/2023                      08/23/2023     Left foot                Right foot              07/19/2023 06/21/2023 04/12/23:                    Assessment:       Encounter  Diagnoses   Name Primary?    Type 2 diabetes mellitus with stage 5 chronic kidney disease Yes    History of diabetic ulcer of foot     Plantar fat pad atrophy     Pre-ulcerative calluses     Hallux limitus, acquired, right     Hallux limitus, acquired, left     Keeps losing balance     Weakness of both lower extremities     Weight loss, unintentional                  Plan:       Catalino was seen today for diabetes mellitus and nail care.    Diagnoses and all orders for this visit:    Type 2 diabetes mellitus with stage 5 chronic kidney disease    History of diabetic ulcer of foot    Plantar fat pad atrophy    Pre-ulcerative calluses    Hallux limitus, acquired, right    Hallux limitus, acquired, left    Keeps losing balance  -     Ambulatory referral/consult to Physical/Occupational Therapy; Future    Weakness of both lower extremities  -     Ambulatory referral/consult to Physical/Occupational Therapy; Future    Weight loss, unintentional          I counseled the patient on his conditions, their implications and medical management.    Education about the prevention of limb loss.    Discussed wound healing cycle, skin integrity, ways to care for skin.Counseled patient on the effects of biomechanical pressure on healing. He verbalizes understanding that it can increase the chances of delayed healing and this prolonged exposure leads to infection or progression of infection which subsequently can result in loss of limb.    Adequate vitamin supplementation, protein intake, and hydration - discussed with patient    All wounds have remained healed. Skin is still delicate therefore patient must be diligent in avoiding excessive pressure and making sure there is adequate support and padding in shoe gear.     I am concerned about   Subjective weakness and loss of balance and the feeling of falling.  Patient relates that although he has been diagnosed with neuropathy for several years he is never seen a neurologist.  Referral  placed to neurologist for evaluation and treatment pending.  In the meantime, referral placed to physical therapy to assist with weakness and gait imbalance.    Follow-up: 8-12 weeks but should call [facility] immediately if any signs of infection, such as fever, chills, sweats, increased redness or pain.    Short-term goals include maintaining good offloading and minimizing bioburden, promoting granulation and epithelialization to healing.  Long-term goals include keeping the wound healed by good offloading and medical management under the direction of internist.    Shoe inspection. Diabetic Foot Education. Patient reminded of the importance of good nutrition and blood sugar control to help prevent podiatric complications of diabetes. Patient instructed on proper foot hygeine. We discussed wearing proper shoe gear, daily foot inspections, never walking without protective shoe gear, never putting sharp instruments to feet.

## 2023-11-15 ENCOUNTER — OFFICE VISIT (OUTPATIENT)
Dept: PODIATRY | Facility: CLINIC | Age: 58
End: 2023-11-15
Payer: COMMERCIAL

## 2023-11-15 VITALS
BODY MASS INDEX: 29.16 KG/M2 | SYSTOLIC BLOOD PRESSURE: 170 MMHG | HEIGHT: 73 IN | WEIGHT: 220 LBS | DIASTOLIC BLOOD PRESSURE: 80 MMHG

## 2023-11-15 DIAGNOSIS — R29.898 WEAKNESS OF BOTH LOWER EXTREMITIES: ICD-10-CM

## 2023-11-15 DIAGNOSIS — E11.22 TYPE 2 DIABETES MELLITUS WITH STAGE 5 CHRONIC KIDNEY DISEASE: Primary | ICD-10-CM

## 2023-11-15 DIAGNOSIS — N18.5 TYPE 2 DIABETES MELLITUS WITH STAGE 5 CHRONIC KIDNEY DISEASE: Primary | ICD-10-CM

## 2023-11-15 DIAGNOSIS — L84 PRE-ULCERATIVE CALLUSES: ICD-10-CM

## 2023-11-15 DIAGNOSIS — Z86.31 HISTORY OF DIABETIC ULCER OF FOOT: ICD-10-CM

## 2023-11-15 DIAGNOSIS — L90.9 PLANTAR FAT PAD ATROPHY: ICD-10-CM

## 2023-11-15 DIAGNOSIS — R26.89 KEEPS LOSING BALANCE: ICD-10-CM

## 2023-11-15 DIAGNOSIS — M20.5X2 HALLUX LIMITUS, ACQUIRED, LEFT: ICD-10-CM

## 2023-11-15 DIAGNOSIS — R63.4 WEIGHT LOSS, UNINTENTIONAL: ICD-10-CM

## 2023-11-15 DIAGNOSIS — M20.5X1 HALLUX LIMITUS, ACQUIRED, RIGHT: ICD-10-CM

## 2023-11-15 PROCEDURE — 1159F PR MEDICATION LIST DOCUMENTED IN MEDICAL RECORD: ICD-10-PCS | Mod: CPTII,S$GLB,, | Performed by: PODIATRIST

## 2023-11-15 PROCEDURE — 99999 PR PBB SHADOW E&M-EST. PATIENT-LVL V: CPT | Mod: PBBFAC,,, | Performed by: PODIATRIST

## 2023-11-15 PROCEDURE — 99999 PR PBB SHADOW E&M-EST. PATIENT-LVL V: ICD-10-PCS | Mod: PBBFAC,,, | Performed by: PODIATRIST

## 2023-11-15 PROCEDURE — 3077F SYST BP >= 140 MM HG: CPT | Mod: CPTII,S$GLB,, | Performed by: PODIATRIST

## 2023-11-15 PROCEDURE — 1159F MED LIST DOCD IN RCRD: CPT | Mod: CPTII,S$GLB,, | Performed by: PODIATRIST

## 2023-11-15 PROCEDURE — 3079F PR MOST RECENT DIASTOLIC BLOOD PRESSURE 80-89 MM HG: ICD-10-PCS | Mod: CPTII,S$GLB,, | Performed by: PODIATRIST

## 2023-11-15 PROCEDURE — 3044F PR MOST RECENT HEMOGLOBIN A1C LEVEL <7.0%: ICD-10-PCS | Mod: CPTII,S$GLB,, | Performed by: PODIATRIST

## 2023-11-15 PROCEDURE — 3079F DIAST BP 80-89 MM HG: CPT | Mod: CPTII,S$GLB,, | Performed by: PODIATRIST

## 2023-11-15 PROCEDURE — 3008F BODY MASS INDEX DOCD: CPT | Mod: CPTII,S$GLB,, | Performed by: PODIATRIST

## 2023-11-15 PROCEDURE — 3008F PR BODY MASS INDEX (BMI) DOCUMENTED: ICD-10-PCS | Mod: CPTII,S$GLB,, | Performed by: PODIATRIST

## 2023-11-15 PROCEDURE — 1160F PR REVIEW ALL MEDS BY PRESCRIBER/CLIN PHARMACIST DOCUMENTED: ICD-10-PCS | Mod: CPTII,S$GLB,, | Performed by: PODIATRIST

## 2023-11-15 PROCEDURE — 99214 PR OFFICE/OUTPT VISIT, EST, LEVL IV, 30-39 MIN: ICD-10-PCS | Mod: S$GLB,,, | Performed by: PODIATRIST

## 2023-11-15 PROCEDURE — 3077F PR MOST RECENT SYSTOLIC BLOOD PRESSURE >= 140 MM HG: ICD-10-PCS | Mod: CPTII,S$GLB,, | Performed by: PODIATRIST

## 2023-11-15 PROCEDURE — 99214 OFFICE O/P EST MOD 30 MIN: CPT | Mod: S$GLB,,, | Performed by: PODIATRIST

## 2023-11-15 PROCEDURE — 3044F HG A1C LEVEL LT 7.0%: CPT | Mod: CPTII,S$GLB,, | Performed by: PODIATRIST

## 2023-11-15 PROCEDURE — 1160F RVW MEDS BY RX/DR IN RCRD: CPT | Mod: CPTII,S$GLB,, | Performed by: PODIATRIST

## 2023-11-28 ENCOUNTER — TELEPHONE (OUTPATIENT)
Dept: NEUROLOGY | Facility: CLINIC | Age: 58
End: 2023-11-28
Payer: COMMERCIAL

## 2023-11-28 ENCOUNTER — TELEPHONE (OUTPATIENT)
Dept: VASCULAR SURGERY | Facility: CLINIC | Age: 58
End: 2023-11-28
Payer: COMMERCIAL

## 2023-11-28 NOTE — TELEPHONE ENCOUNTER
Spoke with patient to confirm appt. with Dr. Kaye on 11/29/23 at 7:00 am. Pt. states that he is not currently in the city, pt. would like to r/s appt. to a different date, 12/22/23 at 7:30 am

## 2023-12-01 ENCOUNTER — OFFICE VISIT (OUTPATIENT)
Dept: GASTROENTEROLOGY | Facility: CLINIC | Age: 58
End: 2023-12-01
Payer: COMMERCIAL

## 2023-12-01 ENCOUNTER — TELEPHONE (OUTPATIENT)
Dept: SURGERY | Facility: CLINIC | Age: 58
End: 2023-12-01
Payer: COMMERCIAL

## 2023-12-01 VITALS
SYSTOLIC BLOOD PRESSURE: 153 MMHG | BODY MASS INDEX: 28.08 KG/M2 | DIASTOLIC BLOOD PRESSURE: 76 MMHG | WEIGHT: 211.88 LBS | HEART RATE: 95 BPM | HEIGHT: 73 IN

## 2023-12-01 DIAGNOSIS — N18.6 CHRONIC KIDNEY DISEASE ON CHRONIC DIALYSIS: ICD-10-CM

## 2023-12-01 DIAGNOSIS — R63.0 DECREASED APPETITE: ICD-10-CM

## 2023-12-01 DIAGNOSIS — K22.4 INEFFECTIVE ESOPHAGEAL MOTILITY: ICD-10-CM

## 2023-12-01 DIAGNOSIS — R63.4 WEIGHT LOSS: ICD-10-CM

## 2023-12-01 DIAGNOSIS — R93.89 ABNORMAL CT SCAN: ICD-10-CM

## 2023-12-01 DIAGNOSIS — Z99.2 CHRONIC KIDNEY DISEASE ON CHRONIC DIALYSIS: ICD-10-CM

## 2023-12-01 DIAGNOSIS — K59.00 CONSTIPATION, UNSPECIFIED CONSTIPATION TYPE: ICD-10-CM

## 2023-12-01 DIAGNOSIS — K21.9 GASTROESOPHAGEAL REFLUX DISEASE, UNSPECIFIED WHETHER ESOPHAGITIS PRESENT: ICD-10-CM

## 2023-12-01 DIAGNOSIS — R14.0 ABDOMINAL BLOATING: ICD-10-CM

## 2023-12-01 DIAGNOSIS — R06.6 HICCUPS: Primary | ICD-10-CM

## 2023-12-01 DIAGNOSIS — K31.84 GASTROPARESIS: ICD-10-CM

## 2023-12-01 PROCEDURE — 99999 PR PBB SHADOW E&M-EST. PATIENT-LVL V: CPT | Mod: PBBFAC,,,

## 2023-12-01 PROCEDURE — 3008F PR BODY MASS INDEX (BMI) DOCUMENTED: ICD-10-PCS | Mod: CPTII,S$GLB,,

## 2023-12-01 PROCEDURE — 1159F PR MEDICATION LIST DOCUMENTED IN MEDICAL RECORD: ICD-10-PCS | Mod: CPTII,S$GLB,,

## 2023-12-01 PROCEDURE — 1160F PR REVIEW ALL MEDS BY PRESCRIBER/CLIN PHARMACIST DOCUMENTED: ICD-10-PCS | Mod: CPTII,S$GLB,,

## 2023-12-01 PROCEDURE — 3078F DIAST BP <80 MM HG: CPT | Mod: CPTII,S$GLB,,

## 2023-12-01 PROCEDURE — 99214 PR OFFICE/OUTPT VISIT, EST, LEVL IV, 30-39 MIN: ICD-10-PCS | Mod: S$GLB,,,

## 2023-12-01 PROCEDURE — 3044F HG A1C LEVEL LT 7.0%: CPT | Mod: CPTII,S$GLB,,

## 2023-12-01 PROCEDURE — 3077F SYST BP >= 140 MM HG: CPT | Mod: CPTII,S$GLB,,

## 2023-12-01 PROCEDURE — 99214 OFFICE O/P EST MOD 30 MIN: CPT | Mod: S$GLB,,,

## 2023-12-01 PROCEDURE — 3008F BODY MASS INDEX DOCD: CPT | Mod: CPTII,S$GLB,,

## 2023-12-01 PROCEDURE — 3078F PR MOST RECENT DIASTOLIC BLOOD PRESSURE < 80 MM HG: ICD-10-PCS | Mod: CPTII,S$GLB,,

## 2023-12-01 PROCEDURE — 1159F MED LIST DOCD IN RCRD: CPT | Mod: CPTII,S$GLB,,

## 2023-12-01 PROCEDURE — 3044F PR MOST RECENT HEMOGLOBIN A1C LEVEL <7.0%: ICD-10-PCS | Mod: CPTII,S$GLB,,

## 2023-12-01 PROCEDURE — 3077F PR MOST RECENT SYSTOLIC BLOOD PRESSURE >= 140 MM HG: ICD-10-PCS | Mod: CPTII,S$GLB,,

## 2023-12-01 PROCEDURE — 1160F RVW MEDS BY RX/DR IN RCRD: CPT | Mod: CPTII,S$GLB,,

## 2023-12-01 PROCEDURE — 99999 PR PBB SHADOW E&M-EST. PATIENT-LVL V: ICD-10-PCS | Mod: PBBFAC,,,

## 2023-12-01 NOTE — PROGRESS NOTES
Subjective:       Patient ID: Catalino Mcfadden is a 58 y.o. male Body mass index is 27.95 kg/m².    Chief Complaint: Hiccups    This patient is new to me.  Established patient of Dr. Pérez, Dr. Francois, Dr. Yang, & Dr. Hill.     GI Problem  The primary symptoms include weight loss (Documented weight loss of 9 lb since 09/05/2023; patient reports having a decreased apple tight and not eating as much due to hiccups), nausea (hx of gastroparesis) and vomiting. Primary symptoms do not include fever, fatigue, abdominal pain, diarrhea, melena, hematemesis, jaundice, hematochezia or dysuria.   The vomiting began more than 2 days ago (Chronic issue since hiccups started). Frequency: Occurs up to 4 times daily for relief of hiccups. The emesis contains bilious material (Denies hematemesis or coffee-ground emesis). Risk factors: Denies suspect food or foreign travel.   The illness is also significant for bloating and constipation (History of constipation currently having 1 BM daily rated stool 1 and 5 on Appling scale). The illness does not include chills, dysphagia or odynophagia. Associated symptoms comments: CHIEF COMPLAINT:  Patient reports chronic hiccups/reflux over the past 1+ years (started 09/2022).  Hiccups are spontaneous and lasts days when present - during episodes of hiccups he has trouble with swallowing and difficulty breathing.  He denies any specific trigger for hiccups.  During episodes of hiccups he also has associated nausea and belching.  The only way to get temporary relief from hiccups is to drink eggnog (which he states is bad for dialysis) or forced emesis.  He is tried and failed treatment with multiple medications including PPIs (Protonix, omeprazole, Nexium), Zofran, Phenergan, baclofen, gabapentin, reglan, thorazine, Imipramine at bedtime, bethanechol 40 mg before meals t.I.d. (for gastroparesis - caused drooling), TCA with elavil 10 mg (for gastroparesis), various home remedies, gastroparesis diet  and diaphragmatic breathing. Significant associated medical issues include GERD (Intermittent reflux after bending; patient reluctant to restart PPIs due to ineffectiveness). Associated medical issues do not include inflammatory bowel disease, gallstones, liver disease, alcohol abuse, PUD, gastric bypass, bowel resection, irritable bowel syndrome, hemorrhoids or diverticulitis.     Review of Systems   Constitutional:  Positive for activity change, appetite change (decreased), unexpected weight change and weight loss (Documented weight loss of 9 lb since 09/05/2023; patient reports having a decreased apple tight and not eating as much due to hiccups). Negative for chills, diaphoresis, fatigue and fever.   HENT:  Negative for sore throat.    Respiratory:  Negative for cough, choking and shortness of breath.    Cardiovascular:  Negative for chest pain.   Gastrointestinal:  Positive for bloating, constipation (History of constipation currently having 1 BM daily rated stool 1 and 5 on Hemphill scale), nausea (hx of gastroparesis) and vomiting. Negative for abdominal distention, abdominal pain, anal bleeding, blood in stool, diarrhea, dysphagia, hematemesis, hematochezia, jaundice, melena and rectal pain.   Genitourinary:  Negative for dysuria.       No LMP for male patient.  Past Medical History:   Diagnosis Date    Diabetes mellitus, type 2     Diabetic foot ulcer associated with diabetes mellitus due to underlying condition 07/16/2020    ESRD on hemodialysis     Hyperlipidemia     Hypertension     Obese      Past Surgical History:   Procedure Laterality Date    AV FISTULA PLACEMENT Left 07/06/2023    Procedure: CREATION, AV FISTULA;  Surgeon: Daniel Poon MD;  Location: [facility] OR;  Service: Vascular;  Laterality: Left;  RN PREOP 6/28/2023  LABS DAY OF SURGERY    BONE BIOPSY Left 07/17/2020    Procedure: BIOPSY, BONE;  Surgeon: Verona Whitmore DPM;  Location: [facility] OR;  Service: Podiatry;  Laterality: Left;     CERVICAL DISC SURGERY  07/11/2016    DEBRIDEMENT Left 07/17/2020    Procedure: DEBRIDEMENT;  Surgeon: Verona Whitmore DPM;  Location: [facility] OR;  Service: Podiatry;  Laterality: Left;    DEBRIDEMENT OF FOOT Right     toes & plantar surface    ESOPHAGEAL MANOMETRY WITH MEASUREMENT OF IMPEDANCE N/A 03/27/2023    Procedure: MANOMETRY, ESOPHAGUS, WITH IMPEDANCE MEASUREMENT;  Surgeon: Roopa Pérez MD;  Location: [facility] ENDO (4TH FLR);  Service: Endoscopy;  Laterality: N/A;  Belching and rumination protocol please  instructions via portal - sm    ESOPHAGOGASTRODUODENOSCOPY N/A 12/16/2022    Procedure: EGD (ESOPHAGOGASTRODUODENOSCOPY);  Surgeon: Eren Francois MD;  Location: [facility] ENDO;  Service: Endoscopy;  Laterality: N/A;  Pt. on Dialysis. K+ ordered prior to procedure.EC    FRACTURE SURGERY Right     medial ankle, metal plate present    INSERTION OF TUNNELED CENTRAL VENOUS CATHETER (CVC) WITH SUBCUTANEOUS PORT N/A 06/11/2021    Procedure: TUNNEL CATH INSERTION WITHOUT PORT;  Surgeon: Jose Manuel Diagnostic Provider;  Location: [facility] OR;  Service: Radiology;  Laterality: N/A;  11AM START---PHONE PREOP 6/10/21---COVID POSTIVE ON 7/2020---NO S/S    left leg orthopedic surgery Left     UPPER GASTROINTESTINAL ENDOSCOPY       Family History   Adopted: Yes   Problem Relation Age of Onset    Heart disease Father     Colon cancer Neg Hx     Esophageal cancer Neg Hx     Colon polyps Neg Hx     Stomach cancer Neg Hx      Social History     Tobacco Use    Smoking status: Never    Smokeless tobacco: Never   Substance Use Topics    Alcohol use: Yes     Comment: occ rare beer    Drug use: No     Wt Readings from Last 10 Encounters:   12/01/23 96.1 kg (211 lb 13.8 oz)   11/15/23 99.8 kg (220 lb 0.3 oz)   09/05/23 99.8 kg (220 lb)   08/28/23 102 kg (224 lb 15.7 oz)   08/23/23 102.1 kg (225 lb)   08/18/23 102.2 kg (225 lb 3.2 oz)   07/19/23 104.2 kg (229 lb 11.5 oz)   07/12/23 104.2 kg (229 lb 11.5 oz)   06/28/23 106.6 kg (235 lb 0.2 oz)    06/21/23 105 kg (231 lb 7.7 oz)     Lab Results   Component Value Date    WBC 5.72 07/06/2023    HGB 10.4 (L) 07/06/2023    HCT 32.3 (L) 07/06/2023    MCV 88 07/06/2023     (L) 07/06/2023     CMP  Sodium   Date Value Ref Range Status   07/06/2023 131 (L) 136 - 145 mmol/L Final     Potassium   Date Value Ref Range Status   07/06/2023 3.6 3.5 - 5.1 mmol/L Final     Chloride   Date Value Ref Range Status   07/06/2023 95 95 - 110 mmol/L Final     CO2   Date Value Ref Range Status   07/06/2023 26 23 - 29 mmol/L Final     Glucose   Date Value Ref Range Status   07/06/2023 102 70 - 110 mg/dL Final     BUN   Date Value Ref Range Status   07/06/2023 15 6 - 20 mg/dL Final     Creatinine   Date Value Ref Range Status   07/06/2023 3.9 (H) 0.5 - 1.4 mg/dL Final     Calcium   Date Value Ref Range Status   07/06/2023 9.1 8.7 - 10.5 mg/dL Final     Total Protein   Date Value Ref Range Status   07/06/2023 7.4 6.0 - 8.4 g/dL Final     Albumin   Date Value Ref Range Status   07/06/2023 3.1 (L) 3.5 - 5.2 g/dL Final     Total Bilirubin   Date Value Ref Range Status   07/06/2023 0.8 0.1 - 1.0 mg/dL Final     Comment:     For infants and newborns, interpretation of results should be based  on gestational age, weight and in agreement with clinical  observations.    Premature Infant recommended reference ranges:  Up to 24 hours.............<8.0 mg/dL  Up to 48 hours............<12.0 mg/dL  3-5 days..................<15.0 mg/dL  6-29 days.................<15.0 mg/dL       Alkaline Phosphatase   Date Value Ref Range Status   07/06/2023 372 (H) 55 - 135 U/L Final     AST   Date Value Ref Range Status   07/06/2023 32 10 - 40 U/L Final     ALT   Date Value Ref Range Status   07/06/2023 26 10 - 44 U/L Final     Anion Gap   Date Value Ref Range Status   07/06/2023 10 8 - 16 mmol/L Final     eGFR if    Date Value Ref Range Status   02/23/2022 10.4 (A) >60 mL/min/1.73 m^2 Final     eGFR if non    Date Value  Ref Range Status   02/23/2022 9.0 (A) >60 mL/min/1.73 m^2 Final     Comment:     Calculation used to obtain the estimated glomerular filtration  rate (eGFR) is the CKD-EPI equation.        Lab Results   Component Value Date    TSH 1.780 02/09/2022     Reviewed prior medical records including office visit with Dr. Francois 08/28/2023, 07/12/2023, 06/12/2023, office visit with Dr. Pérez 03/28/2023, 03/13/2023, Dr. Francois 01/23/2023, Dr. Yang 12/13/2022, Dr. Hill 09/29/2022 radiology report of CT chest 03/22/2023, chest x-ray 02/22/2023, gastric emptying study 05/15/2023, chest x-ray 11/21/2022, retroperitoneal ultrasound 07/28/2021, abdominal ultrasound 11/20/2020 & endoscopy history (see surgical history).    Objective:      Physical Exam  Vitals and nursing note reviewed.   Constitutional:       General: He is not in acute distress.     Appearance: Normal appearance. He is not ill-appearing.   HENT:      Mouth/Throat:      Lips: Pink. No lesions.   Cardiovascular:      Rate and Rhythm: Normal rate and regular rhythm.      Pulses: Normal pulses.      Heart sounds: Normal heart sounds.   Pulmonary:      Effort: Pulmonary effort is normal. No respiratory distress.      Breath sounds: Normal breath sounds.   Abdominal:      General: Bowel sounds are normal. There is no distension or abdominal bruit. There are no signs of injury.      Palpations: Abdomen is soft. There is no shifting dullness, fluid wave, hepatomegaly, splenomegaly or mass.      Tenderness: There is no abdominal tenderness. There is no guarding or rebound. Negative signs include Logan's sign, Rovsing's sign and McBurney's sign.   Skin:     General: Skin is warm and dry.      Coloration: Skin is not jaundiced or pale.   Neurological:      Mental Status: He is alert and oriented to person, place, and time.   Psychiatric:         Attention and Perception: Attention normal.         Mood and Affect: Mood normal.         Speech: Speech normal.          Behavior: Behavior normal.         Assessment:       1. Hiccups    2. Ineffective esophageal motility    3. Gastroparesis    4. Gastroesophageal reflux disease, unspecified whether esophagitis present    5. Weight loss    6. Decreased appetite    7. Constipation, unspecified constipation type    8. Abdominal bloating    9. Abnormal CT scan    10. Chronic kidney disease on chronic dialysis        Plan:       Hiccups & Ineffective esophageal motility  - Follow-up with advanced GI specialist (Dr. Pérez or Dr. Francois) for continued evaluation and management     Gastroparesis  - Recommend small frequent meals instead of 3 big meals a day, low fat meals & low residue diet.  Avoid: high fiber (insoluble fiber), red meats, dairy, and non-digestible solids (peels, fruit pulp, etc). Avoid NSAIDs and narcotics.  -Refer for possible gastric electrical stimulation  -     Ambulatory referral/consult to General Surgery; Future; Expected date: 12/08/2023    Gastroesophageal reflux disease, unspecified whether esophagitis present  -Avoid large meals, avoid eating within 2-3 hours of bedtime (avoid late night eating & lying down soon after eating), elevate head of bed if nocturnal symptoms are present, smoking cessation (if current smoker), & weight loss (if overweight).   -Avoid known foods which trigger reflux symptoms & to minimize/avoid high-fat foods, chocolate, caffeine, citrus, alcohol, & tomato products.  -Avoid/limit use of NSAID's, since they can cause GI upset, bleeding, and/or ulcers. If needed, take with food.  - recommended restarting Protonix    Weight loss & Decreased appetite  - encouraged PO intake and daily calorie counts to ensure adequate nutrition is taken in, recommend at least 2,000 calories a day  - recommend nutritional drinks, such as Boost, Ensure or Glucerna, to supplement nutrition needs    Constipation, unspecified constipation type  -Recommend daily exercise as tolerated, adequate water intake (six  8-oz glasses of water daily), and high fiber diet. OTC fiber supplements are recommended if diet does not reach daily fiber goal (20-30 grams daily), such as Metamucil, Citrucel, or FiberCon (take as directed, separate from other oral medications by >2 hours).  -Recommend trying OTC MiraLax once daily (17g PO) as directed  -If no relief with this, consider adding a emollient laxative (castor oil or mineral oil) +/- enema  -If still no improvement with these measures, call/follow-up  - continue with schedule colonoscopy    Abdominal bloating  - recommend OTC simethicone as directed, such as Phazyme or Gas-x  - recommend low gas diet: Reduce or eliminate these foods from your diet: Broccoli, Cauliflower, [place] sprouts, Cabbage, Cooked dried beans, Carbonated beverages (sparkling water, soda, beer, champagne)  Other Causes Of Excess Gas Include:   1) EATING TOO FAST or TALKING WHILE YOU CHEW may cause you to swallow air. This increases the amount of gas in the stomach and may worsen your symptoms.  --> Chew each mouthful completely before swallowing. Take your time.  2) OVEREATING may increase the feeling of being bloated and cause more gas.  --> When you are full, stop eating.  3) CONSTIPATION can increase the amount of normal intestinal gas.  --> Avoid constipation by increasing the amount of fiber in your diet by including whole cereal grains, fresh vegetables (except those in the above list) and fresh fruits. High-fiber foods absorb water and carry it out of the body. When increasing the amount of fiber in your diet, you also need to increase the amount of water that you drink. You should drink at least eight 8-ounce glasses of water (two quarts) per day.    Abnormal CT scan  - continue with MRI ordered by Dr. Pérez    Chronic kidney disease on chronic dialysis  - follow-up with Nephrology for continued evaluation and management    Follow up in about 4 weeks (around 12/29/2023), or if symptoms worsen or fail to  improve.      If no improvement in symptoms or symptoms worsen, call/follow-up at clinic or go to ER.        Total time spent on the encounter includes face to face time and non-face to face time preparing to see the patient (eg, review of tests), Obtaining and/or reviewing separately obtained history, Documenting clinical information in the electronic or other health record, Independently interpreting results (not separately reported) and communicating results to the patient/family/caregiver, or Care coordination (not separately reported).     A dictation software program was used for this note. Please expect some simple typographical  errors in this note.

## 2023-12-01 NOTE — TELEPHONE ENCOUNTER
Reached out to pt to schedule appt per referral. Unable to reach pt on number on file. No other number on file. Left message letting them know the office was reaching out to schedule them with  for their Gastroparesis. Left office number and direct line number for and a brief message letting them know they can reach out to me or the office when they are ready to schedule.

## 2023-12-04 ENCOUNTER — HOSPITAL ENCOUNTER (OUTPATIENT)
Facility: HOSPITAL | Age: 58
Discharge: HOME | End: 2023-12-07
Attending: EMERGENCY MEDICINE | Admitting: INTERNAL MEDICINE
Payer: COMMERCIAL

## 2023-12-04 DIAGNOSIS — R07.9 CHEST PAIN: ICD-10-CM

## 2023-12-04 DIAGNOSIS — R17 SERUM TOTAL BILIRUBIN ELEVATED: Primary | ICD-10-CM

## 2023-12-04 DIAGNOSIS — Z99.2 ESRD ON HEMODIALYSIS: ICD-10-CM

## 2023-12-04 DIAGNOSIS — Z99.2 ARTERIOVENOUS FISTULA FOR HEMODIALYSIS IN PLACE, PRIMARY: ICD-10-CM

## 2023-12-04 DIAGNOSIS — E87.1 HYPONATREMIA: ICD-10-CM

## 2023-12-04 DIAGNOSIS — N18.6 ESRD ON HEMODIALYSIS: ICD-10-CM

## 2023-12-04 DIAGNOSIS — R06.6 INTRACTABLE HICCUPS: ICD-10-CM

## 2023-12-04 DIAGNOSIS — R06.6 HICCUPS: ICD-10-CM

## 2023-12-04 DIAGNOSIS — Z79.899 HIGH RISK MEDICATION USE: ICD-10-CM

## 2023-12-04 DIAGNOSIS — R11.2 NAUSEA AND VOMITING, UNSPECIFIED VOMITING TYPE: ICD-10-CM

## 2023-12-04 LAB
ALBUMIN SERPL BCP-MCNC: 2.7 G/DL (ref 3.5–5.2)
ALP SERPL-CCNC: 849 U/L (ref 55–135)
ALT SERPL W/O P-5'-P-CCNC: 42 U/L (ref 10–44)
ANION GAP SERPL CALC-SCNC: 13 MMOL/L (ref 8–16)
AST SERPL-CCNC: 65 U/L (ref 10–40)
BASOPHILS # BLD AUTO: 0.04 K/UL (ref 0–0.2)
BASOPHILS NFR BLD: 0.8 % (ref 0–1.9)
BILIRUB SERPL-MCNC: 2.1 MG/DL (ref 0.1–1)
BUN SERPL-MCNC: 38 MG/DL (ref 6–20)
CALCIUM SERPL-MCNC: 8.9 MG/DL (ref 8.7–10.5)
CHLORIDE SERPL-SCNC: 93 MMOL/L (ref 95–110)
CO2 SERPL-SCNC: 21 MMOL/L (ref 23–29)
CREAT SERPL-MCNC: 6.7 MG/DL (ref 0.5–1.4)
DIFFERENTIAL METHOD: ABNORMAL
EOSINOPHIL # BLD AUTO: 0.1 K/UL (ref 0–0.5)
EOSINOPHIL NFR BLD: 2.2 % (ref 0–8)
ERYTHROCYTE [DISTWIDTH] IN BLOOD BY AUTOMATED COUNT: 15.6 % (ref 11.5–14.5)
EST. GFR  (NO RACE VARIABLE): 9 ML/MIN/1.73 M^2
GLUCOSE SERPL-MCNC: 93 MG/DL (ref 70–110)
HCT VFR BLD AUTO: 31.4 % (ref 40–54)
HGB BLD-MCNC: 10.4 G/DL (ref 14–18)
IMM GRANULOCYTES # BLD AUTO: 0.02 K/UL (ref 0–0.04)
IMM GRANULOCYTES NFR BLD AUTO: 0.4 % (ref 0–0.5)
LYMPHOCYTES # BLD AUTO: 0.6 K/UL (ref 1–4.8)
LYMPHOCYTES NFR BLD: 11.9 % (ref 18–48)
MCH RBC QN AUTO: 29.3 PG (ref 27–31)
MCHC RBC AUTO-ENTMCNC: 33.1 G/DL (ref 32–36)
MCV RBC AUTO: 89 FL (ref 82–98)
MONOCYTES # BLD AUTO: 0.8 K/UL (ref 0.3–1)
MONOCYTES NFR BLD: 16.2 % (ref 4–15)
NEUTROPHILS # BLD AUTO: 3.4 K/UL (ref 1.8–7.7)
NEUTROPHILS NFR BLD: 68.5 % (ref 38–73)
NRBC BLD-RTO: 0 /100 WBC
PLATELET # BLD AUTO: 236 K/UL (ref 150–450)
PMV BLD AUTO: 8.9 FL (ref 9.2–12.9)
POCT GLUCOSE: 70 MG/DL (ref 70–110)
POTASSIUM SERPL-SCNC: 4.4 MMOL/L (ref 3.5–5.1)
PROT SERPL-MCNC: 7.6 G/DL (ref 6–8.4)
RBC # BLD AUTO: 3.55 M/UL (ref 4.6–6.2)
SODIUM SERPL-SCNC: 127 MMOL/L (ref 136–145)
WBC # BLD AUTO: 4.89 K/UL (ref 3.9–12.7)

## 2023-12-04 PROCEDURE — 96375 TX/PRO/DX INJ NEW DRUG ADDON: CPT

## 2023-12-04 PROCEDURE — 25000003 PHARM REV CODE 250: Performed by: EMERGENCY MEDICINE

## 2023-12-04 PROCEDURE — 99900035 HC TECH TIME PER 15 MIN (STAT)

## 2023-12-04 PROCEDURE — 85025 COMPLETE CBC W/AUTO DIFF WBC: CPT | Performed by: EMERGENCY MEDICINE

## 2023-12-04 PROCEDURE — 36415 COLL VENOUS BLD VENIPUNCTURE: CPT | Performed by: EMERGENCY MEDICINE

## 2023-12-04 PROCEDURE — 96365 THER/PROPH/DIAG IV INF INIT: CPT

## 2023-12-04 PROCEDURE — 25000003 PHARM REV CODE 250: Performed by: NURSE PRACTITIONER

## 2023-12-04 PROCEDURE — 93010 ELECTROCARDIOGRAM REPORT: CPT | Mod: ,,, | Performed by: INTERNAL MEDICINE

## 2023-12-04 PROCEDURE — 99214 PR OFFICE/OUTPT VISIT, EST, LEVL IV, 30-39 MIN: ICD-10-PCS | Mod: ,,, | Performed by: INTERNAL MEDICINE

## 2023-12-04 PROCEDURE — 63600175 PHARM REV CODE 636 W HCPCS: Performed by: SURGERY

## 2023-12-04 PROCEDURE — G0378 HOSPITAL OBSERVATION PER HR: HCPCS

## 2023-12-04 PROCEDURE — 96361 HYDRATE IV INFUSION ADD-ON: CPT

## 2023-12-04 PROCEDURE — 99285 EMERGENCY DEPT VISIT HI MDM: CPT | Mod: 25

## 2023-12-04 PROCEDURE — 63600175 PHARM REV CODE 636 W HCPCS: Performed by: NURSE PRACTITIONER

## 2023-12-04 PROCEDURE — 93010 EKG 12-LEAD: ICD-10-PCS | Mod: ,,, | Performed by: INTERNAL MEDICINE

## 2023-12-04 PROCEDURE — 99214 OFFICE O/P EST MOD 30 MIN: CPT | Mod: ,,, | Performed by: INTERNAL MEDICINE

## 2023-12-04 PROCEDURE — C9113 INJ PANTOPRAZOLE SODIUM, VIA: HCPCS | Performed by: NURSE PRACTITIONER

## 2023-12-04 PROCEDURE — 80053 COMPREHEN METABOLIC PANEL: CPT | Performed by: EMERGENCY MEDICINE

## 2023-12-04 PROCEDURE — 93005 ELECTROCARDIOGRAM TRACING: CPT

## 2023-12-04 RX ORDER — AMITRIPTYLINE HYDROCHLORIDE 10 MG/1
10 TABLET, FILM COATED ORAL NIGHTLY
Status: DISCONTINUED | OUTPATIENT
Start: 2023-12-04 | End: 2023-12-07 | Stop reason: HOSPADM

## 2023-12-04 RX ORDER — SODIUM,POTASSIUM PHOSPHATES 280-250MG
2 POWDER IN PACKET (EA) ORAL
Status: DISCONTINUED | OUTPATIENT
Start: 2023-12-04 | End: 2023-12-07 | Stop reason: HOSPADM

## 2023-12-04 RX ORDER — NALOXONE HCL 0.4 MG/ML
0.02 VIAL (ML) INJECTION
Status: DISCONTINUED | OUTPATIENT
Start: 2023-12-04 | End: 2023-12-07 | Stop reason: HOSPADM

## 2023-12-04 RX ORDER — IPRATROPIUM BROMIDE AND ALBUTEROL SULFATE 2.5; .5 MG/3ML; MG/3ML
3 SOLUTION RESPIRATORY (INHALATION) EVERY 4 HOURS PRN
Status: DISCONTINUED | OUTPATIENT
Start: 2023-12-04 | End: 2023-12-07 | Stop reason: HOSPADM

## 2023-12-04 RX ORDER — HEPARIN SODIUM 5000 [USP'U]/ML
5000 INJECTION, SOLUTION INTRAVENOUS; SUBCUTANEOUS EVERY 8 HOURS
Status: DISCONTINUED | OUTPATIENT
Start: 2023-12-04 | End: 2023-12-07 | Stop reason: HOSPADM

## 2023-12-04 RX ORDER — SEVELAMER CARBONATE 800 MG/1
800 TABLET, FILM COATED ORAL
Status: DISCONTINUED | OUTPATIENT
Start: 2023-12-05 | End: 2023-12-07 | Stop reason: HOSPADM

## 2023-12-04 RX ORDER — DIAZEPAM 10 MG/2ML
5 INJECTION INTRAMUSCULAR ONCE
Status: COMPLETED | OUTPATIENT
Start: 2023-12-04 | End: 2023-12-04

## 2023-12-04 RX ORDER — MAG HYDROX/ALUMINUM HYD/SIMETH 200-200-20
30 SUSPENSION, ORAL (FINAL DOSE FORM) ORAL 4 TIMES DAILY PRN
Status: DISCONTINUED | OUTPATIENT
Start: 2023-12-04 | End: 2023-12-07 | Stop reason: HOSPADM

## 2023-12-04 RX ORDER — SODIUM CHLORIDE 0.9 % (FLUSH) 0.9 %
10 SYRINGE (ML) INJECTION EVERY 12 HOURS PRN
Status: DISCONTINUED | OUTPATIENT
Start: 2023-12-04 | End: 2023-12-07 | Stop reason: HOSPADM

## 2023-12-04 RX ORDER — NIFEDIPINE 30 MG/1
90 TABLET, EXTENDED RELEASE ORAL DAILY
Status: DISCONTINUED | OUTPATIENT
Start: 2023-12-05 | End: 2023-12-07 | Stop reason: HOSPADM

## 2023-12-04 RX ORDER — SIMETHICONE 80 MG
1 TABLET,CHEWABLE ORAL 4 TIMES DAILY PRN
Status: DISCONTINUED | OUTPATIENT
Start: 2023-12-04 | End: 2023-12-07 | Stop reason: HOSPADM

## 2023-12-04 RX ORDER — ACETAMINOPHEN 325 MG/1
650 TABLET ORAL EVERY 8 HOURS PRN
Status: DISCONTINUED | OUTPATIENT
Start: 2023-12-04 | End: 2023-12-07 | Stop reason: HOSPADM

## 2023-12-04 RX ORDER — METOCLOPRAMIDE HYDROCHLORIDE 5 MG/ML
10 INJECTION INTRAMUSCULAR; INTRAVENOUS ONCE
Status: COMPLETED | OUTPATIENT
Start: 2023-12-04 | End: 2023-12-04

## 2023-12-04 RX ORDER — IBUPROFEN 200 MG
24 TABLET ORAL
Status: DISCONTINUED | OUTPATIENT
Start: 2023-12-04 | End: 2023-12-07 | Stop reason: HOSPADM

## 2023-12-04 RX ORDER — AMOXICILLIN 250 MG
1 CAPSULE ORAL 2 TIMES DAILY
Status: DISCONTINUED | OUTPATIENT
Start: 2023-12-04 | End: 2023-12-05

## 2023-12-04 RX ORDER — IBUPROFEN 200 MG
16 TABLET ORAL
Status: DISCONTINUED | OUTPATIENT
Start: 2023-12-04 | End: 2023-12-07 | Stop reason: HOSPADM

## 2023-12-04 RX ORDER — OLANZAPINE 5 MG/1
10 TABLET, ORALLY DISINTEGRATING ORAL
Status: COMPLETED | OUTPATIENT
Start: 2023-12-04 | End: 2023-12-04

## 2023-12-04 RX ORDER — PROCHLORPERAZINE EDISYLATE 5 MG/ML
10 INJECTION INTRAMUSCULAR; INTRAVENOUS EVERY 6 HOURS PRN
Status: DISCONTINUED | OUTPATIENT
Start: 2023-12-04 | End: 2023-12-06

## 2023-12-04 RX ORDER — BETHANECHOL CHLORIDE 10 MG/1
1 TABLET ORAL 3 TIMES DAILY
Status: ON HOLD | COMMUNITY
Start: 2023-07-26 | End: 2023-12-12 | Stop reason: HOSPADM

## 2023-12-04 RX ORDER — HYDRALAZINE HYDROCHLORIDE 20 MG/ML
10 INJECTION INTRAMUSCULAR; INTRAVENOUS EVERY 6 HOURS PRN
Status: DISCONTINUED | OUTPATIENT
Start: 2023-12-04 | End: 2023-12-07 | Stop reason: HOSPADM

## 2023-12-04 RX ORDER — CEFAZOLIN SODIUM 2 G/50ML
2 SOLUTION INTRAVENOUS ONCE
Status: COMPLETED | OUTPATIENT
Start: 2023-12-04 | End: 2023-12-04

## 2023-12-04 RX ORDER — LORAZEPAM 1 MG/1
1 TABLET ORAL EVERY 6 HOURS PRN
Status: DISCONTINUED | OUTPATIENT
Start: 2023-12-04 | End: 2023-12-07 | Stop reason: HOSPADM

## 2023-12-04 RX ORDER — PANTOPRAZOLE SODIUM 40 MG/10ML
40 INJECTION, POWDER, LYOPHILIZED, FOR SOLUTION INTRAVENOUS 2 TIMES DAILY
Status: DISCONTINUED | OUTPATIENT
Start: 2023-12-04 | End: 2023-12-07 | Stop reason: HOSPADM

## 2023-12-04 RX ORDER — GLUCAGON 1 MG
1 KIT INJECTION
Status: DISCONTINUED | OUTPATIENT
Start: 2023-12-04 | End: 2023-12-07 | Stop reason: HOSPADM

## 2023-12-04 RX ORDER — ONDANSETRON 2 MG/ML
4 INJECTION INTRAMUSCULAR; INTRAVENOUS EVERY 8 HOURS PRN
Status: DISCONTINUED | OUTPATIENT
Start: 2023-12-04 | End: 2023-12-04

## 2023-12-04 RX ORDER — LANOLIN ALCOHOL/MO/W.PET/CERES
800 CREAM (GRAM) TOPICAL
Status: DISCONTINUED | OUTPATIENT
Start: 2023-12-04 | End: 2023-12-07 | Stop reason: HOSPADM

## 2023-12-04 RX ORDER — INSULIN ASPART 100 [IU]/ML
1-10 INJECTION, SOLUTION INTRAVENOUS; SUBCUTANEOUS
Status: DISCONTINUED | OUTPATIENT
Start: 2023-12-04 | End: 2023-12-07 | Stop reason: HOSPADM

## 2023-12-04 RX ORDER — TALC
6 POWDER (GRAM) TOPICAL NIGHTLY PRN
Status: DISCONTINUED | OUTPATIENT
Start: 2023-12-04 | End: 2023-12-07 | Stop reason: HOSPADM

## 2023-12-04 RX ADMIN — AMITRIPTYLINE HYDROCHLORIDE 10 MG: 10 TABLET, FILM COATED ORAL at 09:12

## 2023-12-04 RX ADMIN — HEPARIN SODIUM 5000 UNITS: 5000 INJECTION INTRAVENOUS; SUBCUTANEOUS at 09:12

## 2023-12-04 RX ADMIN — SODIUM CHLORIDE 500 ML: 0.9 INJECTION, SOLUTION INTRAVENOUS at 05:12

## 2023-12-04 RX ADMIN — DIAZEPAM 5 MG: 5 INJECTION, SOLUTION INTRAMUSCULAR; INTRAVENOUS at 04:12

## 2023-12-04 RX ADMIN — HYDRALAZINE HYDROCHLORIDE 10 MG: 20 INJECTION INTRAMUSCULAR; INTRAVENOUS at 06:12

## 2023-12-04 RX ADMIN — DOCUSATE SODIUM AND SENNOSIDES 1 TABLET: 8.6; 5 TABLET, FILM COATED ORAL at 09:12

## 2023-12-04 RX ADMIN — PANTOPRAZOLE SODIUM 40 MG: 40 INJECTION, POWDER, LYOPHILIZED, FOR SOLUTION INTRAVENOUS at 03:12

## 2023-12-04 RX ADMIN — CEFAZOLIN SODIUM 2 G: 2 SOLUTION INTRAVENOUS at 09:12

## 2023-12-04 RX ADMIN — MELATONIN TAB 3 MG 6 MG: 3 TAB at 09:12

## 2023-12-04 RX ADMIN — OLANZAPINE 10 MG: 5 TABLET, ORALLY DISINTEGRATING ORAL at 01:12

## 2023-12-04 RX ADMIN — METOCLOPRAMIDE 10 MG: 5 INJECTION, SOLUTION INTRAMUSCULAR; INTRAVENOUS at 03:12

## 2023-12-04 NOTE — PHARMACY MED REC
"              .    Admission Medication History     The home medication history was taken by Corinne Burnett.    You may go to "Admission" then "Reconcile Home Medications" tabs to review and/or act upon these items.     The home medication list has been updated by the Pharmacy department.   Please read ALL comments highlighted in yellow.   Please address this information as you see fit.    Feel free to contact us if you have any questions or require assistance.        Medications listed below were obtained from: Patient/family and Analytic software- [employer]  No current facility-administered medications on file prior to encounter.     Current Outpatient Medications on File Prior to Encounter   Medication Sig Dispense Refill    hydrALAZINE (APRESOLINE) 100 MG tablet Take 1 tablet (100 mg total) by mouth 3 (three) times daily. 90 tablet 0    NIFEdipine (PROCARDIA XL) 90 MG (OSM) 24 hr tablet Take 1 tablet (90 mg total) by mouth once daily. 90 tablet 3    ondansetron (ZOFRAN-ODT) 4 MG TbDL Take 1 tablet (4 mg total) by mouth every 6 (six) hours as needed (nausea and vomiting). 30 tablet 3    promethazine (PHENERGAN) 12.5 MG Tab Take 1 tablet (12.5 mg total) by mouth every 6 (six) hours as needed (nausea and vomiting). 30 tablet 3    amitriptyline (ELAVIL) 10 MG tablet Take 1 tablet (10 mg total) by mouth nightly as needed for Pain. (Patient not taking: Reported on 12/4/2023) 30 each 11    aspirin 81 MG Chew Take 1 tablet (81 mg total) by mouth once daily. (Patient not taking: Reported on 12/4/2023) 30 tablet 0    atorvastatin (LIPITOR) 40 MG tablet Take 1 tablet (40 mg total) by mouth once daily. (Patient not taking: Reported on 12/4/2023) 30 tablet 0    bethanechol (URECHOLINE) 10 MG Tab Take 1 tablet by mouth 3 (three) times daily.      dulaglutide (TRULICITY) 0.75 mg/0.5 mL pen injector INJECT 0.5 ML UNDER THE SKIN EVERY 7 DAYS (Patient not taking: Reported on 12/4/2023) 12 pen 0    esomeprazole (NEXIUM) 40 MG " capsule Take 1 capsule (40 mg total) by mouth 2 (two) times daily before meals. (Patient not taking: Reported on 12/4/2023) 60 capsule 11    gabapentin (NEURONTIN) 100 MG capsule Take 1 capsule (100 mg total) by mouth 3 (three) times daily. (Patient not taking: Reported on 12/4/2023) 90 capsule 11    heparin sod,pork in 0.45% NaCl (HEPARIN, PORCINE, 100 UNITS) 100 unit/100 mL (1 unit/mL) SolP in sodium chloride 0.45 % 100 mL infusion Inject 1 mL/hr into the vein continuous.      methoxy peg-epoetin beta (MIRCERA INJ) Inject 50 mcg into the skin every 28 days.      pantoprazole (PROTONIX) 40 MG tablet Take 1 tablet by mouth once daily.      sevelamer carbonate (RENVELA) 800 mg Tab Take 1 tablet (800 mg total) by mouth 3 (three) times daily with meals. (Patient not taking: Reported on 12/4/2023) 90 tablet 11    sucroferric oxyhydroxide (VELPHORO) 500 mg Chew Take 1 tablet by mouth once daily.      torsemide (DEMADEX) 20 MG Tab Take 1 tablet (20 mg total) by mouth once daily. Take once a day on non-HD days (Patient not taking: Reported on 12/4/2023) 90 tablet 3       Potential issues to be addressed PRIOR TO DISCHARGE  Patient reported not taking the following medications: (Amitriptyline, Aspirin, Atorvastatin, Bethanechol, Trulicity, Nexium, Gabapentin, Pantoprazole, Renvela, Velphoro & Torsemide). These medications remain on the home medication list. Please address accordingly.     Corinne Burnett  EXT [phone]

## 2023-12-04 NOTE — HPI
Catalino Mcfadden is a 59 y/o M with past medical history significant for HTN, ESRD on HD, DM2, gastroparesis and HLD who presented to the ED for further evaluation of chronic hiccups which has been ongoing x 2 years but has acutely worsened over the past couple of days.  He reports difficulty eating, sleeping, speaking and breathing.  Thus far, there have been no relieving factors.  He has tried and failed multiple treatments to include PPIs, Phenergan, Zofran, baclofen, gabapentin, Reglan, Thorazine, imipramine, bethanechol, and Elavil.  He has been seen by GI specialty and is currently undergoing workup.  Also had ambulatory referral to pulmonology due to abnormal CT scan which identified upper lobe predominant Alicia lymphatic and peribronchovascular micro nodular lung opacities and abnormal appearance of the liver.  Possible etiologies for these findings include metastatic disease although an infectious/inflammatory etiology such as granulomatous disease/sarcoidosis could have this appearance.  He was seen and determined to be a candidate for EBUS, but apparently he was lost to follow up.  It appears that he has lost about 35 pounds since March of this year, upon review of the record.   He was given zyprexa while in the emergency department with no relief of symptoms.  Majority of history is obtained through review of the medical record.  He has constant hiccups; therefore, verbal communication is limited.  He is placed in observation under the service of hospital medicine for continued monitoring and treatment.

## 2023-12-04 NOTE — ED PROVIDER NOTES
Encounter Date: 12/4/2023       History     Chief Complaint   Patient presents with    Hiccups     On and off x 2 years     Shortness of Breath     HPI 58-year-old man with a history of type 2 diabetes, gastroparesis end-stage renal disease on hemodialysis, Tuesday Thursday Saturday, hypertension, hyperlipidemia and chronic hiccups who presents emergency department complaining of intractable hiccups for 2 years.  States his symptoms are worsening and having difficulty eating, sleeping, breathing.  Hiccups are spontaneous and lasts for days on end.  He has tried and failed treatment with multiple medications including PPIs, Phenergan, Zofran, baclofen, gabapentin, Reglan, Thorazine, imipramine, bethanechol, Elavil.  He is being referred to GI specialist.  Review of patient's allergies indicates:  No Known Allergies  Past Medical History:   Diagnosis Date    Diabetes mellitus, type 2     Diabetic foot ulcer associated with diabetes mellitus due to underlying condition 07/16/2020    ESRD on hemodialysis     Hyperlipidemia     Hypertension     Obese      Past Surgical History:   Procedure Laterality Date    AV FISTULA PLACEMENT Left 07/06/2023    Procedure: CREATION, AV FISTULA;  Surgeon: Daniel Poon MD;  Location: [facility] OR;  Service: Vascular;  Laterality: Left;  RN PREOP 6/28/2023  LABS DAY OF SURGERY    BONE BIOPSY Left 07/17/2020    Procedure: BIOPSY, BONE;  Surgeon: Verona Whitmore DPM;  Location: [facility] OR;  Service: Podiatry;  Laterality: Left;    CERVICAL DISC SURGERY  07/11/2016    DEBRIDEMENT Left 07/17/2020    Procedure: DEBRIDEMENT;  Surgeon: Verona Whitmore DPM;  Location: [facility] OR;  Service: Podiatry;  Laterality: Left;    DEBRIDEMENT OF FOOT Right     toes & plantar surface    ESOPHAGEAL MANOMETRY WITH MEASUREMENT OF IMPEDANCE N/A 03/27/2023    Procedure: MANOMETRY, ESOPHAGUS, WITH IMPEDANCE MEASUREMENT;  Surgeon: Roopa Pérez MD;  Location: [facility] ENDO ([address] [place] [address]);  Service: Endoscopy;   Laterality: N/A;  Belching and rumination protocol please  instructions via portal - sm    ESOPHAGOGASTRODUODENOSCOPY N/A 12/16/2022    Procedure: EGD (ESOPHAGOGASTRODUODENOSCOPY);  Surgeon: Eren Francois MD;  Location: [facility];  Service: Endoscopy;  Laterality: N/A;  Pt. on Dialysis. K+ ordered prior to procedure.EC    ESOPHAGOGASTRODUODENOSCOPY N/A 12/5/2023    Procedure: EGD (ESOPHAGOGASTRODUODENOSCOPY);  Surgeon: Kash Johnston MD;  Location: [facility] ENDO;  Service: Endoscopy;  Laterality: N/A;    FRACTURE SURGERY Right     medial ankle, metal plate present    INSERTION OF TUNNELED CENTRAL VENOUS CATHETER (CVC) WITH SUBCUTANEOUS PORT N/A 06/11/2021    Procedure: TUNNEL CATH INSERTION WITHOUT PORT;  Surgeon: Jose Manuel Diagnostic Provider;  Location: [facility] OR;  Service: Radiology;  Laterality: N/A;  11AM START---PHONE PREOP 6/10/21---COVID POSTIVE ON 7/2020---NO S/S    left leg orthopedic surgery Left     UPPER GASTROINTESTINAL ENDOSCOPY       Family History   Adopted: Yes   Problem Relation Age of Onset    Heart disease Father     Colon cancer Neg Hx     Esophageal cancer Neg Hx     Colon polyps Neg Hx     Stomach cancer Neg Hx      Social History     Tobacco Use    Smoking status: Never    Smokeless tobacco: Never   Substance Use Topics    Alcohol use: Yes     Comment: occ rare beer    Drug use: No     Review of Systems   Constitutional:  Positive for activity change and unexpected weight change. Negative for fever.   HENT:  Negative for sore throat.    Respiratory:  Positive for shortness of breath.    Cardiovascular:  Negative for chest pain.   Gastrointestinal:  Negative for nausea.        Positive for hiccups   Genitourinary:  Negative for dysuria.   Musculoskeletal:  Negative for back pain.   Skin:  Negative for rash.   Neurological:  Negative for weakness.   Hematological:  Does not bruise/bleed easily.   Psychiatric/Behavioral:  Positive for sleep disturbance.        Physical Exam     Initial Vitals  [12/04/23 1114]   BP Pulse Resp Temp SpO2   (!) 158/84 99 20 98.8 °F (37.1 °C) 99 %      MAP       --         Physical Exam    Nursing note and vitals reviewed.  Constitutional: He appears well-developed and well-nourished.   Actively hiccuping throughout entire examination   HENT:   Head: Normocephalic and atraumatic.   Eyes: EOM are normal. Pupils are equal, round, and reactive to light.   Neck: Neck supple.   Cardiovascular:  Normal rate and regular rhythm.           Pulmonary/Chest: Breath sounds normal. No respiratory distress.   Abdominal: Abdomen is soft. He exhibits no distension. There is no abdominal tenderness.   Musculoskeletal:         General: Normal range of motion.      Cervical back: Neck supple.     Neurological: He is alert and oriented to person, place, and time. GCS score is 15. GCS eye subscore is 4. GCS verbal subscore is 5. GCS motor subscore is 6.   Skin: Skin is warm and dry. Capillary refill takes less than 2 seconds.         ED Course   Procedures  Labs Reviewed   CBC W/ AUTO DIFFERENTIAL - Abnormal; Notable for the following components:       Result Value    RBC 3.55 (*)     Hemoglobin 10.4 (*)     Hematocrit 31.4 (*)     RDW 15.6 (*)     MPV 8.9 (*)     Lymph # 0.6 (*)     Lymph % 11.9 (*)     Mono % 16.2 (*)     All other components within normal limits   COMPREHENSIVE METABOLIC PANEL - Abnormal; Notable for the following components:    Sodium 127 (*)     Chloride 93 (*)     CO2 21 (*)     BUN 38 (*)     Creatinine 6.7 (*)     Albumin 2.7 (*)     Total Bilirubin 2.1 (*)     Alkaline Phosphatase 849 (*)     AST 65 (*)     eGFR 9 (*)     All other components within normal limits          Imaging Results              X-Ray Chest AP Portable (Final result)  Result time 12/04/23 13:30:03      Final result by Danisha Edgar MD (12/04/23 13:30:03)                   Impression:      There is patchy opacification the right mid lung zone new from prior exam and concerning for developing  pneumonia.      Electronically signed by: Danisha Edgar MD  Date:    12/04/2023  Time:    13:30               Narrative:    EXAMINATION:  XR CHEST AP PORTABLE    CLINICAL HISTORY:  Hiccough    TECHNIQUE:  Single frontal view of the chest was performed.    COMPARISON:  02/22/2023    FINDINGS:  There is a right-sided internal jugular catheter with the tip in the superior vena cava.  There is patchy opacification of the right mid lung zone new from prior exam.                                       Medications   OLANZapine zydis disintegrating tablet 10 mg (10 mg Oral Given 12/4/23 1312)   metoclopramide HCl injection 10 mg (10 mg Intravenous Given 12/4/23 1559)   diazePAM injection 5 mg (5 mg Intravenous Given 12/4/23 1628)   sodium chloride 0.9% bolus 500 mL 500 mL (0 mLs Intravenous Stopped 12/4/23 1915)   cefazolin (ANCEF) 2 gram in dextrose 5% 50 mL IVPB (premix) (0 g Intravenous Stopped 12/4/23 2151)   hydrALAZINE injection 10 mg (10 mg Intravenous Given 12/5/23 2232)   metoclopramide HCl injection 10 mg (10 mg Intravenous Given 12/6/23 0003)   amLODIPine tablet 5 mg (5 mg Oral Given 12/6/23 0659)   labetaloL injection 10 mg (10 mg Intravenous Given 12/7/23 0331)     Medical Decision Making  58-year-old man with a history of type 2 diabetes, gastroparesis end-stage renal disease on hemodialysis, Tuesday Thursday Saturday, hypertension, hyperlipidemia and chronic hiccups who presents emergency department complaining of intractable hiccups for 2 years.  States his symptoms are worsening and having difficulty eating, sleeping, breathing.  Hiccups are spontaneous and lasts for days on end.  He has tried and failed treatment with multiple medications including PPIs, Phenergan, Zofran, baclofen, gabapentin, Reglan, Thorazine, imipramine, bethanechol, Elavil.  He is being referred to GI specialist.     Appears distressed from constant hiccups.    Pt with hyponatremia N a 127, Anemia Hgb 10.4, Hyperbilirubinemia with  total bili 2.1 and elevated alk phos 849.    Unable to controll Hiccups despite adding Zyprexa.Discussed with HM who will admit for GI consult and further treatment.    Amount and/or Complexity of Data Reviewed  Labs: ordered.  Radiology: ordered.    Risk  Prescription drug management.                                      Clinical Impression:  Final diagnoses:  [R06.6] Hiccups  [E87.1] Hyponatremia  [R06.6] Intractable hiccups  [Z79.899] High risk medication use  [R07.9] Chest pain          ED Disposition Condition    Observation Stable                Bishop Agustin MD  12/08/23 1408

## 2023-12-05 ENCOUNTER — ANESTHESIA (OUTPATIENT)
Dept: ENDOSCOPY | Facility: HOSPITAL | Age: 58
End: 2023-12-05
Payer: COMMERCIAL

## 2023-12-05 ENCOUNTER — ANESTHESIA EVENT (OUTPATIENT)
Dept: ENDOSCOPY | Facility: HOSPITAL | Age: 58
End: 2023-12-05
Payer: COMMERCIAL

## 2023-12-05 LAB
ALBUMIN SERPL BCP-MCNC: 2.2 G/DL (ref 3.5–5.2)
ALP SERPL-CCNC: 719 U/L (ref 55–135)
ALT SERPL W/O P-5'-P-CCNC: 29 U/L (ref 10–44)
ANION GAP SERPL CALC-SCNC: 13 MMOL/L (ref 8–16)
AST SERPL-CCNC: 45 U/L (ref 10–40)
BASOPHILS # BLD AUTO: 0.03 K/UL (ref 0–0.2)
BASOPHILS NFR BLD: 0.8 % (ref 0–1.9)
BILIRUB SERPL-MCNC: 1.6 MG/DL (ref 0.1–1)
BUN SERPL-MCNC: 43 MG/DL (ref 6–20)
CALCIUM SERPL-MCNC: 8.5 MG/DL (ref 8.7–10.5)
CHLORIDE SERPL-SCNC: 98 MMOL/L (ref 95–110)
CO2 SERPL-SCNC: 19 MMOL/L (ref 23–29)
CREAT SERPL-MCNC: 7.5 MG/DL (ref 0.5–1.4)
DIFFERENTIAL METHOD: ABNORMAL
EOSINOPHIL # BLD AUTO: 0.1 K/UL (ref 0–0.5)
EOSINOPHIL NFR BLD: 3.3 % (ref 0–8)
ERYTHROCYTE [DISTWIDTH] IN BLOOD BY AUTOMATED COUNT: 15.7 % (ref 11.5–14.5)
EST. GFR  (NO RACE VARIABLE): 8 ML/MIN/1.73 M^2
GLUCOSE SERPL-MCNC: 74 MG/DL (ref 70–110)
HCT VFR BLD AUTO: 28.9 % (ref 40–54)
HGB BLD-MCNC: 9.5 G/DL (ref 14–18)
HIV1+2 IGG SERPL QL IA.RAPID: NORMAL
IMM GRANULOCYTES # BLD AUTO: 0.02 K/UL (ref 0–0.04)
IMM GRANULOCYTES NFR BLD AUTO: 0.6 % (ref 0–0.5)
LYMPHOCYTES # BLD AUTO: 0.5 K/UL (ref 1–4.8)
LYMPHOCYTES NFR BLD: 13.5 % (ref 18–48)
MAGNESIUM SERPL-MCNC: 2 MG/DL (ref 1.6–2.6)
MCH RBC QN AUTO: 29.1 PG (ref 27–31)
MCHC RBC AUTO-ENTMCNC: 32.9 G/DL (ref 32–36)
MCV RBC AUTO: 89 FL (ref 82–98)
MONOCYTES # BLD AUTO: 0.6 K/UL (ref 0.3–1)
MONOCYTES NFR BLD: 15.2 % (ref 4–15)
NEUTROPHILS # BLD AUTO: 2.4 K/UL (ref 1.8–7.7)
NEUTROPHILS NFR BLD: 66.6 % (ref 38–73)
NRBC BLD-RTO: 0 /100 WBC
PHOSPHATE SERPL-MCNC: 5.7 MG/DL (ref 2.7–4.5)
PLATELET # BLD AUTO: 218 K/UL (ref 150–450)
PMV BLD AUTO: 9.5 FL (ref 9.2–12.9)
POCT GLUCOSE: 67 MG/DL (ref 70–110)
POCT GLUCOSE: 76 MG/DL (ref 70–110)
POCT GLUCOSE: 85 MG/DL (ref 70–110)
POTASSIUM SERPL-SCNC: 5 MMOL/L (ref 3.5–5.1)
PROT SERPL-MCNC: 6.6 G/DL (ref 6–8.4)
RBC # BLD AUTO: 3.26 M/UL (ref 4.6–6.2)
SODIUM SERPL-SCNC: 130 MMOL/L (ref 136–145)
WBC # BLD AUTO: 3.63 K/UL (ref 3.9–12.7)

## 2023-12-05 PROCEDURE — 63600175 PHARM REV CODE 636 W HCPCS: Performed by: INTERNAL MEDICINE

## 2023-12-05 PROCEDURE — 63600175 PHARM REV CODE 636 W HCPCS: Performed by: NURSE ANESTHETIST, CERTIFIED REGISTERED

## 2023-12-05 PROCEDURE — 43239 PR EGD, FLEX, W/BIOPSY, SGL/MULTI: ICD-10-PCS | Mod: ,,, | Performed by: INTERNAL MEDICINE

## 2023-12-05 PROCEDURE — 96375 TX/PRO/DX INJ NEW DRUG ADDON: CPT | Mod: 59

## 2023-12-05 PROCEDURE — 63600175 PHARM REV CODE 636 W HCPCS: Performed by: NURSE PRACTITIONER

## 2023-12-05 PROCEDURE — 43239 EGD BIOPSY SINGLE/MULTIPLE: CPT | Mod: ,,, | Performed by: INTERNAL MEDICINE

## 2023-12-05 PROCEDURE — 27201012 HC FORCEPS, HOT/COLD, DISP: Performed by: INTERNAL MEDICINE

## 2023-12-05 PROCEDURE — 25000003 PHARM REV CODE 250: Performed by: INTERNAL MEDICINE

## 2023-12-05 PROCEDURE — 25000003 PHARM REV CODE 250: Performed by: NURSE PRACTITIONER

## 2023-12-05 PROCEDURE — D9220A PRA ANESTHESIA: Mod: CRNA,,, | Performed by: NURSE ANESTHETIST, CERTIFIED REGISTERED

## 2023-12-05 PROCEDURE — 96376 TX/PRO/DX INJ SAME DRUG ADON: CPT | Mod: 59

## 2023-12-05 PROCEDURE — 25000003 PHARM REV CODE 250: Performed by: NURSE ANESTHETIST, CERTIFIED REGISTERED

## 2023-12-05 PROCEDURE — 37000009 HC ANESTHESIA EA ADD 15 MINS: Performed by: INTERNAL MEDICINE

## 2023-12-05 PROCEDURE — 36415 COLL VENOUS BLD VENIPUNCTURE: CPT | Performed by: NURSE PRACTITIONER

## 2023-12-05 PROCEDURE — 80053 COMPREHEN METABOLIC PANEL: CPT | Performed by: NURSE PRACTITIONER

## 2023-12-05 PROCEDURE — 99900035 HC TECH TIME PER 15 MIN (STAT)

## 2023-12-05 PROCEDURE — 83735 ASSAY OF MAGNESIUM: CPT | Performed by: NURSE PRACTITIONER

## 2023-12-05 PROCEDURE — 43239 EGD BIOPSY SINGLE/MULTIPLE: CPT | Performed by: INTERNAL MEDICINE

## 2023-12-05 PROCEDURE — 80074 ACUTE HEPATITIS PANEL: CPT | Performed by: NURSE PRACTITIONER

## 2023-12-05 PROCEDURE — 37000008 HC ANESTHESIA 1ST 15 MINUTES: Performed by: INTERNAL MEDICINE

## 2023-12-05 PROCEDURE — D9220A PRA ANESTHESIA: ICD-10-PCS | Mod: CRNA,,, | Performed by: NURSE ANESTHETIST, CERTIFIED REGISTERED

## 2023-12-05 PROCEDURE — 84100 ASSAY OF PHOSPHORUS: CPT | Performed by: NURSE PRACTITIONER

## 2023-12-05 PROCEDURE — 96372 THER/PROPH/DIAG INJ SC/IM: CPT | Mod: 59 | Performed by: NURSE PRACTITIONER

## 2023-12-05 PROCEDURE — 85025 COMPLETE CBC W/AUTO DIFF WBC: CPT | Performed by: NURSE PRACTITIONER

## 2023-12-05 PROCEDURE — D9220A PRA ANESTHESIA: ICD-10-PCS | Mod: ANES,,, | Performed by: ANESTHESIOLOGY

## 2023-12-05 PROCEDURE — D9220A PRA ANESTHESIA: Mod: ANES,,, | Performed by: ANESTHESIOLOGY

## 2023-12-05 PROCEDURE — 94761 N-INVAS EAR/PLS OXIMETRY MLT: CPT

## 2023-12-05 PROCEDURE — C9113 INJ PANTOPRAZOLE SODIUM, VIA: HCPCS | Performed by: NURSE PRACTITIONER

## 2023-12-05 PROCEDURE — 99214 OFFICE O/P EST MOD 30 MIN: CPT | Mod: ,,, | Performed by: INTERNAL MEDICINE

## 2023-12-05 PROCEDURE — 99214 PR OFFICE/OUTPT VISIT, EST, LEVL IV, 30-39 MIN: ICD-10-PCS | Mod: ,,, | Performed by: INTERNAL MEDICINE

## 2023-12-05 PROCEDURE — 86703 HIV-1/HIV-2 1 RESULT ANTBDY: CPT | Performed by: NURSE PRACTITIONER

## 2023-12-05 PROCEDURE — G0378 HOSPITAL OBSERVATION PER HR: HCPCS

## 2023-12-05 RX ORDER — CHLORPROMAZINE HYDROCHLORIDE 25 MG/1
25 TABLET, FILM COATED ORAL 3 TIMES DAILY PRN
Qty: 90 TABLET | Refills: 0 | Status: SHIPPED | OUTPATIENT
Start: 2023-12-05 | End: 2023-12-07 | Stop reason: SDUPTHER

## 2023-12-05 RX ORDER — SODIUM CHLORIDE 9 MG/ML
INJECTION, SOLUTION INTRAVENOUS ONCE
Status: CANCELLED | OUTPATIENT
Start: 2023-12-05 | End: 2023-12-05

## 2023-12-05 RX ORDER — HEPARIN SODIUM 1000 [USP'U]/ML
4000 INJECTION, SOLUTION INTRAVENOUS; SUBCUTANEOUS
Status: DISCONTINUED | OUTPATIENT
Start: 2023-12-05 | End: 2023-12-07 | Stop reason: HOSPADM

## 2023-12-05 RX ORDER — PROPOFOL 10 MG/ML
VIAL (ML) INTRAVENOUS
Status: DISCONTINUED | OUTPATIENT
Start: 2023-12-05 | End: 2023-12-05

## 2023-12-05 RX ORDER — HEPARIN SODIUM 5000 [USP'U]/ML
5000 INJECTION, SOLUTION INTRAVENOUS; SUBCUTANEOUS
Status: CANCELLED | OUTPATIENT
Start: 2023-12-05

## 2023-12-05 RX ORDER — LIDOCAINE HYDROCHLORIDE 20 MG/ML
INJECTION INTRAVENOUS
Status: DISCONTINUED | OUTPATIENT
Start: 2023-12-05 | End: 2023-12-05

## 2023-12-05 RX ORDER — DIPHENOXYLATE HYDROCHLORIDE AND ATROPINE SULFATE 2.5; .025 MG/1; MG/1
1 TABLET ORAL 4 TIMES DAILY PRN
Status: DISCONTINUED | OUTPATIENT
Start: 2023-12-06 | End: 2023-12-07 | Stop reason: HOSPADM

## 2023-12-05 RX ORDER — HYOSCYAMINE SULFATE 0.12 MG/1
0.12 TABLET SUBLINGUAL EVERY 4 HOURS PRN
Status: DISCONTINUED | OUTPATIENT
Start: 2023-12-06 | End: 2023-12-07 | Stop reason: HOSPADM

## 2023-12-05 RX ORDER — MUPIROCIN 20 MG/G
OINTMENT TOPICAL 2 TIMES DAILY
Status: DISCONTINUED | OUTPATIENT
Start: 2023-12-05 | End: 2023-12-07 | Stop reason: HOSPADM

## 2023-12-05 RX ORDER — METOCLOPRAMIDE HYDROCHLORIDE 5 MG/ML
10 INJECTION INTRAMUSCULAR; INTRAVENOUS ONCE
Status: COMPLETED | OUTPATIENT
Start: 2023-12-06 | End: 2023-12-06

## 2023-12-05 RX ORDER — SODIUM CHLORIDE 9 MG/ML
INJECTION, SOLUTION INTRAVENOUS
Status: CANCELLED | OUTPATIENT
Start: 2023-12-05

## 2023-12-05 RX ORDER — HYDRALAZINE HYDROCHLORIDE 25 MG/1
100 TABLET, FILM COATED ORAL EVERY 8 HOURS
Status: DISCONTINUED | OUTPATIENT
Start: 2023-12-05 | End: 2023-12-07 | Stop reason: HOSPADM

## 2023-12-05 RX ORDER — HYDRALAZINE HYDROCHLORIDE 20 MG/ML
10 INJECTION INTRAMUSCULAR; INTRAVENOUS ONCE
Status: COMPLETED | OUTPATIENT
Start: 2023-12-05 | End: 2023-12-05

## 2023-12-05 RX ORDER — SODIUM CHLORIDE 9 MG/ML
INJECTION, SOLUTION INTRAVENOUS CONTINUOUS
Status: DISCONTINUED | OUTPATIENT
Start: 2023-12-05 | End: 2023-12-06

## 2023-12-05 RX ORDER — AMITRIPTYLINE HYDROCHLORIDE 10 MG/1
10 TABLET, FILM COATED ORAL NIGHTLY
Qty: 30 TABLET | Refills: 0 | Status: SHIPPED | OUTPATIENT
Start: 2023-12-05 | End: 2024-03-06 | Stop reason: CLARIF

## 2023-12-05 RX ADMIN — HYDRALAZINE HYDROCHLORIDE 10 MG: 20 INJECTION INTRAMUSCULAR; INTRAVENOUS at 08:12

## 2023-12-05 RX ADMIN — SEVELAMER CARBONATE 800 MG: 800 TABLET, FILM COATED ORAL at 05:12

## 2023-12-05 RX ADMIN — HEPARIN SODIUM 5000 UNITS: 5000 INJECTION INTRAVENOUS; SUBCUTANEOUS at 10:12

## 2023-12-05 RX ADMIN — EPOETIN ALFA-EPBX 5100 UNITS: 10000 INJECTION, SOLUTION INTRAVENOUS; SUBCUTANEOUS at 03:12

## 2023-12-05 RX ADMIN — HYDRALAZINE HYDROCHLORIDE 100 MG: 25 TABLET, FILM COATED ORAL at 05:12

## 2023-12-05 RX ADMIN — MUPIROCIN: 20 OINTMENT TOPICAL at 09:12

## 2023-12-05 RX ADMIN — NIFEDIPINE 90 MG: 30 TABLET, FILM COATED, EXTENDED RELEASE ORAL at 09:12

## 2023-12-05 RX ADMIN — SODIUM CHLORIDE: 0.9 INJECTION, SOLUTION INTRAVENOUS at 11:12

## 2023-12-05 RX ADMIN — PANTOPRAZOLE SODIUM 40 MG: 40 INJECTION, POWDER, LYOPHILIZED, FOR SOLUTION INTRAVENOUS at 08:12

## 2023-12-05 RX ADMIN — LIDOCAINE HYDROCHLORIDE 100 MG: 20 INJECTION, SOLUTION INTRAVENOUS at 11:12

## 2023-12-05 RX ADMIN — DOCUSATE SODIUM AND SENNOSIDES 1 TABLET: 8.6; 5 TABLET, FILM COATED ORAL at 09:12

## 2023-12-05 RX ADMIN — PANTOPRAZOLE SODIUM 40 MG: 40 INJECTION, POWDER, LYOPHILIZED, FOR SOLUTION INTRAVENOUS at 09:12

## 2023-12-05 RX ADMIN — HEPARIN SODIUM 4000 UNITS: 1000 INJECTION, SOLUTION INTRAVENOUS; SUBCUTANEOUS at 03:12

## 2023-12-05 RX ADMIN — PROPOFOL 150 MG: 10 INJECTION, EMULSION INTRAVENOUS at 11:12

## 2023-12-05 RX ADMIN — HEPARIN SODIUM 5000 UNITS: 5000 INJECTION INTRAVENOUS; SUBCUTANEOUS at 05:12

## 2023-12-05 RX ADMIN — HYDRALAZINE HYDROCHLORIDE 10 MG: 20 INJECTION INTRAMUSCULAR; INTRAVENOUS at 10:12

## 2023-12-05 RX ADMIN — HEPARIN SODIUM 5000 UNITS: 5000 INJECTION INTRAVENOUS; SUBCUTANEOUS at 03:12

## 2023-12-05 RX ADMIN — DIPHENOXYLATE HYDROCHLORIDE AND ATROPINE SULFATE 1 TABLET: 2.5; .025 TABLET ORAL at 11:12

## 2023-12-05 NOTE — TRANSFER OF CARE
"Anesthesia Transfer of Care Note    Patient: Catalino Mcfadden    Procedure(s) Performed: Procedure(s) (LRB):  EGD (ESOPHAGOGASTRODUODENOSCOPY) (N/A)    Patient location: GI    Anesthesia Type: general    Transport from OR: Transported from OR on room air with adequate spontaneous ventilation    Post pain: adequate analgesia    Post assessment: no apparent anesthetic complications    Post vital signs: stable    Level of consciousness: awake    Nausea/Vomiting: no nausea/vomiting    Complications: none    Transfer of care protocol was followed      Last vitals: Visit Vitals  BP (!) 223/105   Pulse 83   Temp 36.6 °C (97.9 °F)   Resp 14   Ht 6' 1" (1.854 m)   Wt 102.1 kg (225 lb)   SpO2 100%   BMI 29.69 kg/m²     "

## 2023-12-05 NOTE — ASSESSMENT & PLAN NOTE
Patient has hyponatremia which is uncontrolled,We will aim to correct the sodium by 4-6mEq in 24 hours. We will monitor sodium Daily. The hyponatremia is due to Dehydration/hypovolemia. We will treat the hyponatremia with IV fluids as follows: IVF bolus NS-caution due to ESRD. The patient's sodium results have been reviewed and are listed below.  Recent Labs   Lab 12/04/23  1302   *     Nephrology consulted

## 2023-12-05 NOTE — PROGRESS NOTES
EGD done.  No significant findings.  GI to sign off.  Recommend follow up with motility specialist as well as with advanced endoscopy/pulmonology for EBUS as previously recommended at main campus.

## 2023-12-05 NOTE — H&P
[facility]  History & Physical    Patient Name: Catalino Mcfadden  MRN: [id number]  Patient Class: OP- Hospital Outpatient Surgery  Admission Date: 12/4/2023  Attending Physician: Roger Joshua MD   Primary Care Provider: Lizbeth Primary Doctor         Patient information was obtained from patient, past medical records, and ER records.     Subjective:     Principal Problem:Intractable hiccups    Chief Complaint:   Chief Complaint   Patient presents with    Hiccups     On and off x 2 years     Shortness of Breath        HPI: Catalino Mcfadden is a 59 y/o M with past medical history significant for HTN, ESRD on HD, DM2, gastroparesis and HLD who presented to the ED for further evaluation of chronic hiccups which has been ongoing x 2 years but has acutely worsened over the past couple of days.  He reports difficulty eating, sleeping, speaking and breathing.  Thus far, there have been no relieving factors.  He has tried and failed multiple treatments to include PPIs, Phenergan, Zofran, baclofen, gabapentin, Reglan, Thorazine, imipramine, bethanechol, and Elavil.  He has been seen by GI specialty and is currently undergoing workup.  Also had ambulatory referral to pulmonology due to abnormal CT scan which identified upper lobe predominant Alicia lymphatic and peribronchovascular micro nodular lung opacities and abnormal appearance of the liver.  Possible etiologies for these findings include metastatic disease although an infectious/inflammatory etiology such as granulomatous disease/sarcoidosis could have this appearance.  He was seen and determined to be a candidate for EBUS, but apparently he was lost to follow up.  It appears that he has lost about 35 pounds since March of this year, upon review of the record.   He was given zyprexa while in the emergency department with no relief of symptoms.  Majority of history is obtained through review of the medical record.  He has constant hiccups;  therefore, verbal communication is limited.  He is placed in observation under the service of hospital medicine for continued monitoring and treatment.    Past Medical History:   Diagnosis Date    Diabetes mellitus, type 2     Diabetic foot ulcer associated with diabetes mellitus due to underlying condition 07/16/2020    ESRD on hemodialysis     Hyperlipidemia     Hypertension     Obese        Past Surgical History:   Procedure Laterality Date    AV FISTULA PLACEMENT Left 07/06/2023    Procedure: CREATION, AV FISTULA;  Surgeon: Daniel Poon MD;  Location: [facility] OR;  Service: Vascular;  Laterality: Left;  RN PREOP 6/28/2023  LABS DAY OF SURGERY    BONE BIOPSY Left 07/17/2020    Procedure: BIOPSY, BONE;  Surgeon: Verona Whitmore DPM;  Location: [facility] OR;  Service: Podiatry;  Laterality: Left;    CERVICAL DISC SURGERY  07/11/2016    DEBRIDEMENT Left 07/17/2020    Procedure: DEBRIDEMENT;  Surgeon: Verona Whitmore DPM;  Location: [facility] OR;  Service: Podiatry;  Laterality: Left;    DEBRIDEMENT OF FOOT Right     toes & plantar surface    ESOPHAGEAL MANOMETRY WITH MEASUREMENT OF IMPEDANCE N/A 03/27/2023    Procedure: MANOMETRY, ESOPHAGUS, WITH IMPEDANCE MEASUREMENT;  Surgeon: Roopa Pérez MD;  Location: [facility] ENDO (4TH FLR);  Service: Endoscopy;  Laterality: N/A;  Belching and rumination protocol please  instructions via portal - sm    ESOPHAGOGASTRODUODENOSCOPY N/A 12/16/2022    Procedure: EGD (ESOPHAGOGASTRODUODENOSCOPY);  Surgeon: Eren Francois MD;  Location: [facility];  Service: Endoscopy;  Laterality: N/A;  Pt. on Dialysis. K+ ordered prior to procedure.EC    FRACTURE SURGERY Right     medial ankle, metal plate present    INSERTION OF TUNNELED CENTRAL VENOUS CATHETER (CVC) WITH SUBCUTANEOUS PORT N/A 06/11/2021    Procedure: TUNNEL CATH INSERTION WITHOUT PORT;  Surgeon: Jose Manuel Diagnostic Provider;  Location: [facility] OR;  Service: Radiology;  Laterality: N/A;  11AM START---PHONE PREOP 6/10/21---COVID POSTIVE  ON 7/2020---NO S/S    left leg orthopedic surgery Left     UPPER GASTROINTESTINAL ENDOSCOPY         Review of patient's allergies indicates:  No Known Allergies    No current facility-administered medications on file prior to encounter.     Current Outpatient Medications on File Prior to Encounter   Medication Sig    hydrALAZINE (APRESOLINE) 100 MG tablet Take 1 tablet (100 mg total) by mouth 3 (three) times daily.    NIFEdipine (PROCARDIA XL) 90 MG (OSM) 24 hr tablet Take 1 tablet (90 mg total) by mouth once daily.    ondansetron (ZOFRAN-ODT) 4 MG TbDL Take 1 tablet (4 mg total) by mouth every 6 (six) hours as needed (nausea and vomiting).    promethazine (PHENERGAN) 12.5 MG Tab Take 1 tablet (12.5 mg total) by mouth every 6 (six) hours as needed (nausea and vomiting).    amitriptyline (ELAVIL) 10 MG tablet Take 1 tablet (10 mg total) by mouth nightly as needed for Pain. (Patient not taking: Reported on 12/4/2023)    aspirin 81 MG Chew Take 1 tablet (81 mg total) by mouth once daily. (Patient not taking: Reported on 12/4/2023)    atorvastatin (LIPITOR) 40 MG tablet Take 1 tablet (40 mg total) by mouth once daily. (Patient not taking: Reported on 12/4/2023)    bethanechol (URECHOLINE) 10 MG Tab Take 1 tablet by mouth 3 (three) times daily.    dulaglutide (TRULICITY) 0.75 mg/0.5 mL pen injector INJECT 0.5 ML UNDER THE SKIN EVERY 7 DAYS (Patient not taking: Reported on 12/4/2023)    esomeprazole (NEXIUM) 40 MG capsule Take 1 capsule (40 mg total) by mouth 2 (two) times daily before meals. (Patient not taking: Reported on 12/4/2023)    gabapentin (NEURONTIN) 100 MG capsule Take 1 capsule (100 mg total) by mouth 3 (three) times daily. (Patient not taking: Reported on 12/4/2023)    heparin sod,pork in 0.45% NaCl (HEPARIN, PORCINE, 100 UNITS) 100 unit/100 mL (1 unit/mL) SolP in sodium chloride 0.45 % 100 mL infusion Inject 1 mL/hr into the vein continuous.    methoxy peg-epoetin beta (MIRCERA INJ) Inject 50 mcg into the  skin every 28 days.    pantoprazole (PROTONIX) 40 MG tablet Take 1 tablet by mouth once daily.    sevelamer carbonate (RENVELA) 800 mg Tab Take 1 tablet (800 mg total) by mouth 3 (three) times daily with meals. (Patient not taking: Reported on 12/4/2023)    sucroferric oxyhydroxide (VELPHORO) 500 mg Chew Take 1 tablet by mouth once daily.    torsemide (DEMADEX) 20 MG Tab Take 1 tablet (20 mg total) by mouth once daily. Take once a day on non-HD days (Patient not taking: Reported on 12/4/2023)     Family History       Problem Relation (Age of Onset)    Heart disease Father          Tobacco Use    Smoking status: Never    Smokeless tobacco: Never   Substance and Sexual Activity    Alcohol use: Yes     Comment: occ rare beer    Drug use: No    Sexual activity: Not Currently     Review of Systems   Constitutional:  Positive for appetite change, fatigue and unexpected weight change. Negative for chills and fever.   Respiratory:  Positive for shortness of breath. Negative for cough.    Cardiovascular:  Negative for chest pain and palpitations.   Gastrointestinal:  Negative for diarrhea, nausea and vomiting.        Hiccups     Skin:  Negative for pallor and rash.   Neurological:  Negative for seizures and syncope.   Psychiatric/Behavioral:  Negative for agitation and confusion. The patient is nervous/anxious.    All other systems reviewed and are negative.    Objective:     Vital Signs (Most Recent):  Temp: 98.8 °F (37.1 °C) (12/04/23 1114)  Pulse: 101 (12/04/23 1755)  Resp: (!) 24 (12/04/23 1755)  BP: (!) 189/92 (12/04/23 1730)  SpO2: 99 % (12/04/23 1755) Vital Signs (24h Range):  Temp:  [98.8 °F (37.1 °C)] 98.8 °F (37.1 °C)  Pulse:  [] 101  Resp:  [18-24] 24  SpO2:  [97 %-100 %] 99 %  BP: (158-202)/(84-98) 189/92     Weight: 95.7 kg (211 lb)  Body mass index is 27.84 kg/m².     Physical Exam  Constitutional:       General: He is not in acute distress.     Appearance: He is well-developed. He is ill-appearing.       Comments: Constant hiccups   HENT:      Head: Normocephalic and atraumatic.   Eyes:      Conjunctiva/sclera: Conjunctivae normal.      Pupils: Pupils are equal, round, and reactive to light.   Cardiovascular:      Rate and Rhythm: Normal rate and regular rhythm.      Heart sounds: Normal heart sounds.   Pulmonary:      Effort: Pulmonary effort is normal.      Breath sounds: Normal breath sounds.   Chest:      Comments: Has tunneled catheter right chest wall  Abdominal:      General: Bowel sounds are normal.      Palpations: Abdomen is soft.   Musculoskeletal:         General: Normal range of motion.      Cervical back: Normal range of motion and neck supple.      Comments: AV fistula LUE   Skin:     General: Skin is warm and dry.   Neurological:      Mental Status: He is alert and oriented to person, place, and time.   Psychiatric:         Behavior: Behavior normal.              CRANIAL NERVES     CN III, IV, VI   Pupils are equal, round, and reactive to light.       Significant Labs: All pertinent labs within the past 24 hours have been reviewed.  CBC:   Recent Labs   Lab 12/04/23  1302   WBC 4.89   HGB 10.4*   HCT 31.4*        CMP:   Recent Labs   Lab 12/04/23  1302   *   K 4.4   CL 93*   CO2 21*   GLU 93   BUN 38*   CREATININE 6.7*   CALCIUM 8.9   PROT 7.6   ALBUMIN 2.7*   BILITOT 2.1*   ALKPHOS 849*   AST 65*   ALT 42   ANIONGAP 13       Significant Imaging: I have reviewed all pertinent imaging results/findings within the past 24 hours.  Assessment/Plan:     * Intractable hiccups  Etiology unclear.  Has tried and failed an extensive list of medications.  Will place him back on PPI as per most recent GI note  Consult GI-plan EGD tomorrow, NPO after MN  Continue nightly elavil    Hyponatremia  Patient has hyponatremia which is uncontrolled,We will aim to correct the sodium by 4-6mEq in 24 hours. We will monitor sodium Daily. The hyponatremia is due to Dehydration/hypovolemia. We will treat the  "hyponatremia with IV fluids as follows: IVF bolus NS-caution due to ESRD. The patient's sodium results have been reviewed and are listed below.  Recent Labs   Lab 12/04/23  1302   *     Nephrology consulted    Serum total bilirubin elevated  No overt abdominal tenderness on exam  US abd  Hepatitis panel  Trend  GI consulted      ESRD on hemodialysis  Chronic; consult nephrology for routine dialysis TTS    Hyperlipidemia, acquired   Patient is chronically on statin.will continue for now. Monitor clinically. Last LDL was   Lab Results   Component Value Date    LDLCALC 124.8 02/22/2023            Essential hypertension  Chronic, uncontrolled. Latest blood pressure and vitals reviewed-     Temp:  [97.4 °F (36.3 °C)-98.8 °F (37.1 °C)]   Pulse:  []   Resp:  [16-24]   BP: (158-202)/(77-98)   SpO2:  [97 %-100 %] .   Home meds for hypertension were reviewed and noted below.   Hypertension Medications               hydrALAZINE (APRESOLINE) 100 MG tablet Take 1 tablet (100 mg total) by mouth 3 (three) times daily.    NIFEdipine (PROCARDIA XL) 90 MG (OSM) 24 hr tablet Take 1 tablet (90 mg total) by mouth once daily.    torsemide (DEMADEX) 20 MG Tab Take 1 tablet (20 mg total) by mouth once daily. Take once a day on non-HD days            While in the hospital, will manage blood pressure as follows; Continue home antihypertensive regimen    Will utilize p.r.n. blood pressure medication only if patient's blood pressure greater than 180/110 and he develops symptoms such as worsening chest pain or shortness of breath.    Type 2 diabetes mellitus with stage 5 chronic kidney disease  Patient's FSGs are controlled on current medication regimen.  Last A1c reviewed-   Lab Results   Component Value Date    HGBA1C 5.1 02/22/2023     Most recent fingerstick glucose reviewed- No results for input(s): "POCTGLUCOSE" in the last 24 hours.  Current correctional scale  Medium  Maintain anti-hyperglycemic dose as follows- "   Antihyperglycemics (From admission, onward)      Start     Stop Route Frequency Ordered    12/04/23 1827  insulin aspart U-100 pen 1-10 Units         -- SubQ Before meals & nightly PRN 12/04/23 1827          Hold Oral hypoglycemics while patient is in the hospital.      VTE Risk Mitigation (From admission, onward)           Ordered     heparin (porcine) injection 5,000 Units  Every 8 hours         12/04/23 1827     IP VTE HIGH RISK PATIENT  Once         12/04/23 1827     Place sequential compression device  Until discontinued         12/04/23 1827                          SOHAM Rodriguez  Department of Hospital Medicine  [facility]/Surg

## 2023-12-05 NOTE — PROGRESS NOTES
12/05/23 1644   Required for all Hemodialysis Patients   Hepatitis Status negative   Handoff Report   Received From JUAN J Layton   Given To JUAN J Munson   Treatment Type   Treatment Type Maintenance   Vital Signs   Temp 98.3 °F (36.8 °C)   Temp Source Oral   Pulse 85   Resp 18   BP (!) 208/100        Hemodialysis Catheter right subclavian   No placement date or time found.   Present Prior to Hospital Arrival?: Yes  Hemodialysis Catheter Type: Tunneled catheter  Location: right subclavian  Placement Verification: Blood return   Line Necessity Review CRRT/HD   Site Assessment No drainage;No redness;No swelling   Line Securement Device Secured with sutureless device   Dressing Type CHG impregnated dressing/sponge   Dressing Status Clean;Dry;Intact   Dressing Intervention Integrity maintained   Date on Dressing 12/05/23   Venous Patency/Care deaccessed;flushed w/o difficulty;heparin locked   Arterial Patency/Care deaccessed;flushed w/o difficulty;heparin locked   Post-Hemodialysis Assessment   Rinseback Volume (mL) 250 mL   Blood Volume Processed (Liters) 66.1 L   Dialyzer Clearance Lightly streaked   Duration of Treatment 180 minutes   Additional Fluid Intake (mL) 500 mL   Total UF (mL) 4500 mL   Net Fluid Removal 4000   Patient Response to Treatment mica tx   Post-Treatment Weight 95.7 kg (210 lb 15.7 oz)   Treatment Weight Change -4.3   Post-Hemodialysis Comments pt with net uf of 4L     Pt with net uf of 4l per nc.  MD aware of b/p stated to have primary nurse get in touch with admitting md to have pt resume home meds.  Consent signed prior to hd start per MD and pt.

## 2023-12-05 NOTE — CONSULTS
Nephrology Consult Note        Patient Name: Catalino Mcfadden  MRN: [id number]    Patient Class: OP- Observation   Admission Date: 12/4/2023  Length of Stay: 0 days  Date of Service: 12/5/2023    Attending Physician: Roger Joshua MD  Primary Care Provider: Lizbeth, Primary Doctor    Reason for Consult: esrd    SUBJECTIVE:     HPI: 58-year-old man with a history of type 2 diabetes, gastroparesis end-stage renal disease on hemodialysis, Tuesday Thursday Saturday, hypertension, hyperlipidemia and chronic hiccups who presents emergency department complaining of intractable hiccups for 2 years.  States his symptoms are worsening and having difficulty eating, sleeping, breathing.  Hiccups are spontaneous and lasts for days on end.  He has tried and failed treatment with multiple medications including PPIs, Phenergan, Zofran, baclofen, gabapentin, Reglan, Thorazine, imipramine, bethanechol, Elavil.  He is being referred to GI specialist.     Past Medical History:   Diagnosis Date    Diabetes mellitus, type 2     Diabetic foot ulcer associated with diabetes mellitus due to underlying condition 07/16/2020    ESRD on hemodialysis     Hyperlipidemia     Hypertension     Obese      Past Surgical History:   Procedure Laterality Date    AV FISTULA PLACEMENT Left 07/06/2023    Procedure: CREATION, AV FISTULA;  Surgeon: Daniel Poon MD;  Location: [facility] OR;  Service: Vascular;  Laterality: Left;  RN PREOP 6/28/2023  LABS DAY OF SURGERY    BONE BIOPSY Left 07/17/2020    Procedure: BIOPSY, BONE;  Surgeon: Verona Whitmore DPM;  Location: [facility] OR;  Service: Podiatry;  Laterality: Left;    CERVICAL DISC SURGERY  07/11/2016    DEBRIDEMENT Left 07/17/2020    Procedure: DEBRIDEMENT;  Surgeon: Verona Whitmore DPM;  Location: [facility] OR;  Service: Podiatry;  Laterality: Left;    DEBRIDEMENT OF FOOT Right     toes & plantar surface    ESOPHAGEAL MANOMETRY WITH MEASUREMENT OF IMPEDANCE N/A 03/27/2023    Procedure: MANOMETRY, ESOPHAGUS, WITH  IMPEDANCE MEASUREMENT;  Surgeon: Roopa Pérez MD;  Location: [facility] ENDO (4TH FLR);  Service: Endoscopy;  Laterality: N/A;  Belching and rumination protocol please  instructions via portal - sm    ESOPHAGOGASTRODUODENOSCOPY N/A 12/16/2022    Procedure: EGD (ESOPHAGOGASTRODUODENOSCOPY);  Surgeon: Eren Francois MD;  Location: [facility] ENDO;  Service: Endoscopy;  Laterality: N/A;  Pt. on Dialysis. K+ ordered prior to procedure.EC    FRACTURE SURGERY Right     medial ankle, metal plate present    INSERTION OF TUNNELED CENTRAL VENOUS CATHETER (CVC) WITH SUBCUTANEOUS PORT N/A 06/11/2021    Procedure: TUNNEL CATH INSERTION WITHOUT PORT;  Surgeon: Dosc Diagnostic Provider;  Location: [facility] OR;  Service: Radiology;  Laterality: N/A;  11AM START---PHONE PREOP 6/10/21---COVID POSTIVE ON 7/2020---NO S/S    left leg orthopedic surgery Left     UPPER GASTROINTESTINAL ENDOSCOPY       Family History   Adopted: Yes   Problem Relation Age of Onset    Heart disease Father     Colon cancer Neg Hx     Esophageal cancer Neg Hx     Colon polyps Neg Hx     Stomach cancer Neg Hx      Social History     Tobacco Use    Smoking status: Never    Smokeless tobacco: Never   Substance Use Topics    Alcohol use: Yes     Comment: occ rare beer    Drug use: No       Review of patient's allergies indicates:  No Known Allergies    Outpatient meds:  No current facility-administered medications on file prior to encounter.     Current Outpatient Medications on File Prior to Encounter   Medication Sig Dispense Refill    hydrALAZINE (APRESOLINE) 100 MG tablet Take 1 tablet (100 mg total) by mouth 3 (three) times daily. 90 tablet 0    NIFEdipine (PROCARDIA XL) 90 MG (OSM) 24 hr tablet Take 1 tablet (90 mg total) by mouth once daily. 90 tablet 3    ondansetron (ZOFRAN-ODT) 4 MG TbDL Take 1 tablet (4 mg total) by mouth every 6 (six) hours as needed (nausea and vomiting). 30 tablet 3    promethazine (PHENERGAN) 12.5 MG Tab Take 1 tablet (12.5 mg total) by  mouth every 6 (six) hours as needed (nausea and vomiting). 30 tablet 3    amitriptyline (ELAVIL) 10 MG tablet Take 1 tablet (10 mg total) by mouth nightly as needed for Pain. (Patient not taking: Reported on 12/4/2023) 30 each 11    aspirin 81 MG Chew Take 1 tablet (81 mg total) by mouth once daily. (Patient not taking: Reported on 12/4/2023) 30 tablet 0    atorvastatin (LIPITOR) 40 MG tablet Take 1 tablet (40 mg total) by mouth once daily. (Patient not taking: Reported on 12/4/2023) 30 tablet 0    bethanechol (URECHOLINE) 10 MG Tab Take 1 tablet by mouth 3 (three) times daily.      dulaglutide (TRULICITY) 0.75 mg/0.5 mL pen injector INJECT 0.5 ML UNDER THE SKIN EVERY 7 DAYS (Patient not taking: Reported on 12/4/2023) 12 pen 0    esomeprazole (NEXIUM) 40 MG capsule Take 1 capsule (40 mg total) by mouth 2 (two) times daily before meals. (Patient not taking: Reported on 12/4/2023) 60 capsule 11    gabapentin (NEURONTIN) 100 MG capsule Take 1 capsule (100 mg total) by mouth 3 (three) times daily. (Patient not taking: Reported on 12/4/2023) 90 capsule 11    heparin sod,pork in 0.45% NaCl (HEPARIN, PORCINE, 100 UNITS) 100 unit/100 mL (1 unit/mL) SolP in sodium chloride 0.45 % 100 mL infusion Inject 1 mL/hr into the vein continuous.      methoxy peg-epoetin beta (MIRCERA INJ) Inject 50 mcg into the skin every 28 days.      pantoprazole (PROTONIX) 40 MG tablet Take 1 tablet by mouth once daily.      sevelamer carbonate (RENVELA) 800 mg Tab Take 1 tablet (800 mg total) by mouth 3 (three) times daily with meals. (Patient not taking: Reported on 12/4/2023) 90 tablet 11    sucroferric oxyhydroxide (VELPHORO) 500 mg Chew Take 1 tablet by mouth once daily.      torsemide (DEMADEX) 20 MG Tab Take 1 tablet (20 mg total) by mouth once daily. Take once a day on non-HD days (Patient not taking: Reported on 12/4/2023) 90 tablet 3       Scheduled meds:   amitriptyline  10 mg Oral QHS    epoetin jose-epbx  50 Units/kg Intravenous Every  Tues, Thurs, Sat    heparin (porcine)  5,000 Units Subcutaneous Q8H    mupirocin   Nasal BID    NIFEdipine  90 mg Oral Daily    pantoprazole  40 mg Intravenous BID    senna-docusate 8.6-50 mg  1 tablet Oral BID    sevelamer carbonate  800 mg Oral TID WM       Infusions:   sodium chloride 0.9%         PRN meds:  acetaminophen, albuterol-ipratropium, aluminum-magnesium hydroxide-simethicone, dextrose 10%, dextrose 10%, glucagon (human recombinant), glucose, glucose, hydrALAZINE, insulin aspart U-100, LORazepam, magnesium oxide, magnesium oxide, melatonin, naloxone, potassium bicarbonate, potassium bicarbonate, potassium bicarbonate, potassium, sodium phosphates, potassium, sodium phosphates, potassium, sodium phosphates, prochlorperazine, promethazine (PHENERGAN) 12.5 mg in dextrose 5 % (D5W) 50 mL IVPB, simethicone, sodium chloride 0.9%    Review of Systems:  Constitutional:  Negative for chills, fever, malaise/fatigue and weight loss.   HENT:  Negative for hearing loss and nosebleeds.    Eyes:  Negative for blurred vision, double vision and photophobia.   Respiratory:  Negative for cough, shortness of breath and wheezing.    Cardiovascular:  Negative for chest pain, palpitations and leg swelling.   Gastrointestinal:  Negative for abdominal pain, constipation, diarrhea, heartburn, nausea and vomiting.   Genitourinary:  Negative for dysuria, frequency and urgency.   Musculoskeletal:  Negative for falls, joint pain and myalgias.   Skin:  Negative for itching and rash.   Neurological:  Negative for dizziness, speech change, focal weakness, loss of consciousness and headaches.   Endo/Heme/Allergies:  Does not bruise/bleed easily.   Psychiatric/Behavioral:  Negative for depression and substance abuse. The patient is not nervous/anxious.      OBJECTIVE:     Vital Signs and IO:  Temp:  [97.4 °F (36.3 °C)-98.6 °F (37 °C)]   Pulse:  []   Resp:  [14-24]   BP: (145-223)/()   SpO2:  [93 %-100 %]   I/O last 3  completed shifts:  In: 50 [I.V.:50]  Out: -   Wt Readings from Last 5 Encounters:   12/05/23 102.1 kg (225 lb)   12/01/23 96.1 kg (211 lb 13.8 oz)   11/15/23 99.8 kg (220 lb 0.3 oz)   09/05/23 99.8 kg (220 lb)   08/28/23 102 kg (224 lb 15.7 oz)     Body mass index is 29.69 kg/m².    Physical Exam  Constitutional:       General: Patient is not in acute distress.     Appearance: Patient is well-developed. She is not diaphoretic.   HENT:      Head: Normocephalic and atraumatic.      Mouth/Throat: Mucous membranes are moist.   Eyes:      General: No scleral icterus.     Pupils: Pupils are equal, round, and reactive to light.   Cardiovascular:      Rate and Rhythm: Normal rate and regular rhythm.   Pulmonary:      Effort: Pulmonary effort is normal. No respiratory distress.      Breath sounds: No stridor.   Abdominal:      General: There is no distension.      Palpations: Abdomen is soft.   Musculoskeletal:         General: No deformity. Normal range of motion.      Cervical back: Neck supple.   Skin:     General: Skin is warm and dry.      Findings: No rash present. No erythema.   Neurological:      Mental Status: Patient is alert and oriented to person, place, and time.      Cranial Nerves: No cranial nerve deficit.   Psychiatric:         Behavior: Behavior normal.     Laboratory:  Recent Labs   Lab 12/04/23  1302 12/05/23  0500   * 130*   K 4.4 5.0   CL 93* 98   CO2 21* 19*   BUN 38* 43*   CREATININE 6.7* 7.5*   GLU 93 74       Recent Labs   Lab 12/04/23  1302 12/05/23  0500   CALCIUM 8.9 8.5*   ALBUMIN 2.7* 2.2*   PHOS  --  5.7*   MG  --  2.0       Recent Labs   Lab 02/23/22  0758   PTH, Intact 218.0 H       Recent Labs   Lab 12/04/23  2123 12/05/23  0735   POCTGLUCOSE 70 85       Recent Labs   Lab 02/09/22  0722 02/23/22  0758 02/22/23  0653   Hemoglobin A1C 5.0 5.0 5.1       Recent Labs   Lab 12/04/23  1302 12/05/23  0500   WBC 4.89 3.63*   HGB 10.4* 9.5*   HCT 31.4* 28.9*    218   MCV 89 89   MCHC  33.1 32.9   MONO 16.2*  0.8 15.2*  0.6   EOSINOPHIL 2.2 3.3       Recent Labs   Lab 12/04/23  1302 12/05/23  0500   BILITOT 2.1* 1.6*   PROT 7.6 6.6   ALBUMIN 2.7* 2.2*   ALKPHOS 849* 719*   ALT 42 29   AST 65* 45*       Recent Labs   Lab 07/27/21  1624   Color, UA Yellow   Appearance, UA Cloudy A   pH, UA 5.0   Specific Gravity, UA 1.010   Protein, UA 3+ A   Glucose, UA 1+ A   Ketones, UA Negative   Bilirubin (UA) Negative   Occult Blood UA Negative   Nitrite, UA Negative   RBC, UA 1   WBC, UA 3   Bacteria Occasional   Hyaline Casts, UA 0       Recent Labs   Lab 09/24/22  1355   Sample VENOUS       Microbiology Results (last 7 days)       ** No results found for the last 168 hours. **            ASSESSMENT/PLAN:     ESRD on HD TTS via AVF  Next HD per schedule.  Continue current dialysis prescription.  Renal diet - low K, low phos.  No IVs or BP checks on access and/or non-dominant arm.    Anemia of CKD  Hgb and HCT are acceptable. Monitor for now.  Will provide CLEMENT and/or IV iron PRN.    MBD / Secondary HPT  Ca, Phos, PTH and vitamin D levels are acceptable.   Phos binders, vitamin D and analogues, calcimimetics will be given as needed.    HTN  BP seem controlled.   Tolerate asymptomatic HTN up to -160.  Continue home meds.  Low sodium diet.    Thank you for allowing us to participate in the care of your patient!   We will follow the patient and provide recommendations as needed.    Patient care time was spent personally by me on the following activities:     Obtaining a history.  Examination of patient.  Providing medical care at the patients bedside.  Developing a treatment plan with patient or surrogate and bedside caregivers.  Ordering and reviewing laboratory studies, radiographic studies, pulse oximetry.  Ordering and performing treatments and interventions.  Evaluation of patient's response to treatment.  Discussions with consultants while on the unit and immediately available to the  patient.  Re-evaluation of the patient's condition.  Documentation in the medical record.     Juan Waters MD    [facility]  [address] [place] [id number]    [phone] - tel  [phone] - fax    12/5/2023

## 2023-12-05 NOTE — ASSESSMENT & PLAN NOTE
Chronic, uncontrolled. Latest blood pressure and vitals reviewed-     Temp:  [97.4 °F (36.3 °C)-98.8 °F (37.1 °C)]   Pulse:  []   Resp:  [16-24]   BP: (158-202)/(77-98)   SpO2:  [97 %-100 %] .   Home meds for hypertension were reviewed and noted below.   Hypertension Medications               hydrALAZINE (APRESOLINE) 100 MG tablet Take 1 tablet (100 mg total) by mouth 3 (three) times daily.    NIFEdipine (PROCARDIA XL) 90 MG (OSM) 24 hr tablet Take 1 tablet (90 mg total) by mouth once daily.    torsemide (DEMADEX) 20 MG Tab Take 1 tablet (20 mg total) by mouth once daily. Take once a day on non-HD days            While in the hospital, will manage blood pressure as follows; Continue home antihypertensive regimen    Will utilize p.r.n. blood pressure medication only if patient's blood pressure greater than 180/110 and he develops symptoms such as worsening chest pain or shortness of breath.

## 2023-12-05 NOTE — ASSESSMENT & PLAN NOTE
Etiology unclear.  Has tried and failed an extensive list of medications.  Will place him back on PPI as per most recent GI note  Consult GI-plan EGD tomorrow, NPO after MN  Continue nightly elavil

## 2023-12-05 NOTE — CONSULTS
12/05/2023      Admit Date: 12/4/2023  Catalino Mcfadden  New Patient Consult    Chief Complaint   Patient presents with    Hiccups     On and off x 2 years     Shortness of Breath       History of Present Illness:  Pt is a 57 yo male with DM2, ESRD on HD, HTN, HLD, gastroparesis, chronic hiccups who is admitted due to exacerbation of his hiccups, nausea and emesis. He was supposed to undergo EBUS with Dr. Huynh for evaluation of mediastinal and hilar adenopathy, potential sarcoidosis dx but was lost to follow up earlier this yr. He denies cough, sob. Hiccups are improved at the moment. He is going to have EGD today. He does notice sob when having hiccups. At baseline he is able to walk without limitation without dyspnea. He has been on dialysis for approx 2 yrs. He is unclear about what caused his renal failure. He states he had potential exposure to chemicals while in the [employer].      PFSH:  Past Medical History:   Diagnosis Date    Diabetes mellitus, type 2     Diabetic foot ulcer associated with diabetes mellitus due to underlying condition 07/16/2020    ESRD on hemodialysis     Hyperlipidemia     Hypertension     Obese      Past Surgical History:   Procedure Laterality Date    AV FISTULA PLACEMENT Left 07/06/2023    Procedure: CREATION, AV FISTULA;  Surgeon: Daniel Poon MD;  Location: [facility] OR;  Service: Vascular;  Laterality: Left;  RN PREOP 6/28/2023  LABS DAY OF SURGERY    BONE BIOPSY Left 07/17/2020    Procedure: BIOPSY, BONE;  Surgeon: Verona Whitmore DPM;  Location: [facility] OR;  Service: Podiatry;  Laterality: Left;    CERVICAL DISC SURGERY  07/11/2016    DEBRIDEMENT Left 07/17/2020    Procedure: DEBRIDEMENT;  Surgeon: Verona Whitmore DPM;  Location: [facility] OR;  Service: Podiatry;  Laterality: Left;    DEBRIDEMENT OF FOOT Right     toes & plantar surface    ESOPHAGEAL MANOMETRY WITH MEASUREMENT OF IMPEDANCE N/A 03/27/2023    Procedure: MANOMETRY, ESOPHAGUS, WITH IMPEDANCE MEASUREMENT;  Surgeon: Roopa  "HARVINDER Pérez MD;  Location: [facility] ENDO (4TH FLR);  Service: Endoscopy;  Laterality: N/A;  Belching and rumination protocol please  instructions via portal - sm    ESOPHAGOGASTRODUODENOSCOPY N/A 12/16/2022    Procedure: EGD (ESOPHAGOGASTRODUODENOSCOPY);  Surgeon: Eren Francois MD;  Location: [facility] ENDO;  Service: Endoscopy;  Laterality: N/A;  Pt. on Dialysis. K+ ordered prior to procedure.EC    FRACTURE SURGERY Right     medial ankle, metal plate present    INSERTION OF TUNNELED CENTRAL VENOUS CATHETER (CVC) WITH SUBCUTANEOUS PORT N/A 06/11/2021    Procedure: TUNNEL CATH INSERTION WITHOUT PORT;  Surgeon: Dosc Diagnostic Provider;  Location: [facility] OR;  Service: Radiology;  Laterality: N/A;  11AM START---PHONE PREOP 6/10/21---COVID POSTIVE ON 7/2020---NO S/S    left leg orthopedic surgery Left     UPPER GASTROINTESTINAL ENDOSCOPY       Social History     Tobacco Use    Smoking status: Never    Smokeless tobacco: Never   Substance Use Topics    Alcohol use: Yes     Comment: occ rare beer    Drug use: No     Family History   Adopted: Yes   Problem Relation Age of Onset    Heart disease Father     Colon cancer Neg Hx     Esophageal cancer Neg Hx     Colon polyps Neg Hx     Stomach cancer Neg Hx      Review of patient's allergies indicates:  No Known Allergies    Performance Status:Performance Status:The patient's activity level is no limits with regular activity.    Review of Systems:  a review of eleven systems covering constitutional, Psych, Eye, HEENT, Respiratory, Cardiac, GI, , Musculoskeletal, Endocrine, Dermatologicwas negative except the above mentioned abnormalities and for any pertinent findings as listed below:  All negative with pertinent positives as above          Exam:Comprehensive exam done. BP (!) 187/79 (BP Location: Right arm, Patient Position: Lying)   Pulse 86   Temp 98.3 °F (36.8 °C) (Oral)   Resp 18   Ht 6' 1" (1.854 m)   Wt 102.4 kg (225 lb 12 oz)   SpO2 (!) 93%   BMI 29.78 kg/m²   Exam " "included Vitals as listed, and patient's appearance and affect and alertness and mood, oral exam for yeast and hygiene and pharynx lesions and Mallapatti (M) score, neck with inspection for jvd and masses and thyroid abnormalities and lymph nodes (supraclavicular and infraclavicular nodes also examined and noted if abn), chest exam included symmetry and effort and fremitus and percussion and auscultation, cardiac exam included rhythm and gallops and murmur and rubs and jvd and edema, abdominal exam for mass and hepatosplenomegaly and tenderness and hernias and bowel sounds, Musculoskeletal exam with muscle tone and posture and mobility/gait and  strenght, and skin for rashes and cyanosis and pallor and turgor, extremity for clubbing.  Findings were normal except as listed below:  Oropharynx clear  Hr regular  Breath sounds clear bilaterally  Abd soft nontender  No edema/clubbing  LUE AVF with thrill  R chest tunnel cath    Radiographs reviewed: view by direct vision   CT chest 3/2023- hilar and mediastinal adenopathy, bilateral perihilar micronodules in a perilymphatic distribution       Labs     Recent Labs   Lab 12/05/23  0500   WBC 3.63*   HGB 9.5*   HCT 28.9*        Recent Labs   Lab 12/05/23  0500   *   K 5.0   CL 98   CO2 19*   BUN 43*   CREATININE 7.5*   GLU 74   CALCIUM 8.5*   MG 2.0   PHOS 5.7*   AST 45*   ALT 29   ALKPHOS 719*   BILITOT 1.6*   PROT 6.6   ALBUMIN 2.2*   No results for input(s): "PH", "PCO2", "PO2", "HCO3" in the last 24 hours.  Microbiology Results (last 7 days)       ** No results found for the last 168 hours. **            Impression:  Active Hospital Problems    Diagnosis  POA    *Intractable hiccups [R06.6]  Yes    Serum total bilirubin elevated [R17]  Yes    ESRD on hemodialysis [N18.6, Z99.2]  Not Applicable    Hyponatremia [E87.1]  Yes    Hyperlipidemia, acquired [E78.5]  Yes     Chronic    Type 2 diabetes mellitus with stage 5 chronic kidney disease [E11.22, N18.5]  " Yes    Essential hypertension [I10]  Yes     Chronic      Resolved Hospital Problems   No resolved problems to display.               Plan:   Mediastinal, hilar LAD with perilymphatic micronodules- pattern classic for sarcoidosis- needs biopsies and tissue diagnosis  Gastroparesis  Intractable hiccups  ESRD on dialysis    - reschedule follow up in 1-2 wks with Dr. Huynh in pulm clinic for evaluation for EBUS  - management of hiccups per GI  - dialysis per nephrology  - pulmonary will sign off at this time; please call if further questions arise    Yohana Castro MD  Pulmonary & Critical Care Medicine

## 2023-12-05 NOTE — PROVATION PATIENT INSTRUCTIONS
Discharge Summary/Instructions after an Endoscopic Procedure  Patient Name: Catalino Mcfadden  Patient MRN: [id number]  Patient YOB: 1965 Tuesday, December 5, 2023  Kash Hua MD  Dear patient,  As a result of recent federal legislation (The Federal Cures Act), you may   receive lab or pathology results from your procedure in your [facility]   account before your physician is able to contact you. Your physician or   their representative will relay the results to you with their   recommendations at their soonest availability.  Thank you,  RESTRICTIONS:  During your procedure today, you received medications for sedation.  These   medications may affect your judgment, balance and coordination.  Therefore,   for 24 hours, you have the following restrictions:   - DO NOT drive a car, operate machinery, make legal/financial decisions,   sign important papers or drink alcohol.    ACTIVITY:  Today: no heavy lifting, straining or running due to procedural   sedation/anesthesia.  The following day: return to full activity including work.  DIET:  Eat and drink normally unless instructed otherwise.     TREATMENT FOR COMMON SIDE EFFECTS:  - Mild abdominal pain, nausea, belching, bloating or excessive gas:  rest,   eat lightly and use a heating pad.  - Sore Throat: treat with throat lozenges and/or gargle with warm salt   water.  - Because air was used during the procedure, expelling large amounts of air   from your rectum or belching is normal.  - If a bowel prep was taken, you may not have a bowel movement for 1-3 days.    This is normal.  SYMPTOMS TO WATCH FOR AND REPORT TO YOUR PHYSICIAN:  1. Abdominal pain or bloating, other than gas cramps.  2. Chest pain.  3. Back pain.  4. Signs of infection such as: chills or fever occurring within 24 hours   after the procedure.  5. Rectal bleeding, which would show as bright red, maroon, or black stools.   (A tablespoon of blood from the rectum is not serious, especially if    hemorrhoids are present.)  6. Vomiting.  7. Weakness or dizziness.  GO DIRECTLY TO THE NEAREST EMERGENCY ROOM IF YOU HAVE ANY OF THE FOLLOWING:      Difficulty breathing              Chills and/or fever over 101 F   Persistent vomiting and/or vomiting blood   Severe abdominal pain   Severe chest pain   Black, tarry stools   Bleeding- more than one tablespoon   Any other symptom or condition that you feel may need urgent attention  Your doctor recommends these additional instructions:  If any biopsies were taken, your doctors clinic will contact you in 1 to 2   weeks with any results.  - [facility].   - Resume previous diet.   - Continue present medications.   - No aspirin, ibuprofen, naproxen, or other non-steroidal anti-inflammatory   drugs.   - Await pathology results.   - Follow an antireflux regimen.   - Return to referring physician.  For questions, problems or results please call your physician - Kash Hua MD at Work:  [phone].  [facility], EMERGENCY ROOM PHONE NUMBER: [phone]  IF A COMPLICATION OR EMERGENCY SITUATION ARISES AND YOU ARE UNABLE TO REACH   YOUR PHYSICIAN - GO DIRECTLY TO THE EMERGENCY ROOM.  Kash Hua MD  12/5/2023 11:19:53 AM  This report has been verified and signed electronically.  Dear patient,  As a result of recent federal legislation (The Federal Cures Act), you may   receive lab or pathology results from your procedure in your [facility]   account before your physician is able to contact you. Your physician or   their representative will relay the results to you with their   recommendations at their soonest availability.  Thank you,  PROVATION

## 2023-12-05 NOTE — PLAN OF CARE
Patient cleared for discharge from case management standpoint.    Pulmonology office to contact patient to schedule hospital follow ups.  Patient states he goes to [facility] for healthcare needs and will schedule PCP.         12/05/23 1434   Final Note   Assessment Type Final Discharge Note   Anticipated Discharge Disposition Home   What phone number can be called within the next 1-3 days to see how you are doing after discharge? [phone]   Hospital Resources/Appts/Education Provided Provided patient/caregiver with written discharge plan information;Provided education on problems/symptoms using teachback;Appointment suggestion unavailable   Post-Acute Status   Post-Acute Authorization Other   Other Status No Post-Acute Service Needs   Discharge Delays None known at this time

## 2023-12-05 NOTE — ASSESSMENT & PLAN NOTE
Patient is chronically on statin.will continue for now. Monitor clinically. Last LDL was   Lab Results   Component Value Date    LDLCALC 124.8 02/22/2023

## 2023-12-05 NOTE — CONSULTS
[facility]     CC: Hiccups, N/V    HPI 58 y.o. male with weight loss (Documented weight loss of 9 lb since 09/05/2023; patient reports having a decreased appetite and not eating as much due to hiccups), nausea (hx of gastroparesis) and vomiting. Primary symptoms do not include fever, fatigue, abdominal pain, diarrhea, melena, hematemesis, jaundice, hematochezia or dysuria.   The vomiting began more than 2 days ago (Chronic issue since hiccups started). Frequency: Occurs up to 4 times daily for relief of hiccups. The emesis contains bilious material (Denies hematemesis or coffee-ground emesis). Risk factors: Denies suspect food or foreign travel.   The illness is also significant for bloating and constipation (History of constipation currently having 1 BM daily rated stool 1 and 5 on [place] scale). The illness does not include chills, dysphagia or odynophagia. Associated symptoms comments: CHIEF COMPLAINT:  Patient reports chronic hiccups/reflux over the past 1+ years (started 09/2022).  Hiccups are spontaneous and lasts days when present - during episodes of hiccups he has trouble with swallowing and difficulty breathing.  He denies any specific trigger for hiccups.  During episodes of hiccups he also has associated nausea and belching.  The only way to get temporary relief from hiccups is to drink eggnog (which he states is bad for dialysis) or forced emesis.  He is tried and failed treatment with multiple medications including PPIs (Protonix, omeprazole, Nexium), Zofran, Phenergan, baclofen, gabapentin, reglan, thorazine, Imipramine at bedtime, bethanechol 40 mg before meals t.I.d. (for gastroparesis - caused drooling), TCA with elavil 10 mg (for gastroparesis), various home remedies, gastroparesis diet and diaphragmatic breathing. Significant associated medical issues include GERD (Intermittent reflux after bending; patient reluctant to restart PPIs due to ineffectiveness). Associated medical  issues do not include inflammatory bowel disease, gallstones, liver disease, alcohol abuse, PUD, gastric bypass, bowel resection, irritable bowel syndrome, hemorrhoids or diverticulitis.     FURTHER HISTORY:  Above obtained from independent review of records from admitting provider as well as from direct discussion with nursing who states patient has intractable hiccups.  In addition, on my interview, I note the following:  Patient with ongoing problems as above, remote onset, worsening, associated with N/V, with multiple prior studies as noted in documentation from Dr. Worley.  Patient has failed multiple medications.  He was supposed to have EBUS but I do not see documentation that this has been done.      Past Medical History:   Diagnosis Date    Diabetes mellitus, type 2     Diabetic foot ulcer associated with diabetes mellitus due to underlying condition 07/16/2020    ESRD on hemodialysis     Hyperlipidemia     Hypertension     Obese        Past Surgical History:   Procedure Laterality Date    AV FISTULA PLACEMENT Left 07/06/2023    Procedure: CREATION, AV FISTULA;  Surgeon: Daniel Poon MD;  Location: [facility] OR;  Service: Vascular;  Laterality: Left;  RN PREOP 6/28/2023  LABS DAY OF SURGERY    BONE BIOPSY Left 07/17/2020    Procedure: BIOPSY, BONE;  Surgeon: Verona Whitmore DPM;  Location: [facility] OR;  Service: Podiatry;  Laterality: Left;    CERVICAL DISC SURGERY  07/11/2016    DEBRIDEMENT Left 07/17/2020    Procedure: DEBRIDEMENT;  Surgeon: Verona Whitmore DPM;  Location: [facility] OR;  Service: Podiatry;  Laterality: Left;    DEBRIDEMENT OF FOOT Right     toes & plantar surface    ESOPHAGEAL MANOMETRY WITH MEASUREMENT OF IMPEDANCE N/A 03/27/2023    Procedure: MANOMETRY, ESOPHAGUS, WITH IMPEDANCE MEASUREMENT;  Surgeon: Roopa Pérez MD;  Location: [facility] ENDO ([address] [place] [address]);  Service: Endoscopy;  Laterality: N/A;  Belching and rumination protocol please  instructions via portal -      "ESOPHAGOGASTRODUODENOSCOPY N/A 12/16/2022    Procedure: EGD (ESOPHAGOGASTRODUODENOSCOPY);  Surgeon: Eren Francois MD;  Location: [facility] ENDO;  Service: Endoscopy;  Laterality: N/A;  Pt. on Dialysis. K+ ordered prior to procedure.EC    FRACTURE SURGERY Right     medial ankle, metal plate present    INSERTION OF TUNNELED CENTRAL VENOUS CATHETER (CVC) WITH SUBCUTANEOUS PORT N/A 06/11/2021    Procedure: TUNNEL CATH INSERTION WITHOUT PORT;  Surgeon: Jose Manuel Diagnostic Provider;  Location: [facility] OR;  Service: Radiology;  Laterality: N/A;  11AM START---PHONE PREOP 6/10/21---COVID POSTIVE ON 7/2020---NO S/S    left leg orthopedic surgery Left     UPPER GASTROINTESTINAL ENDOSCOPY         Social History     Tobacco Use    Smoking status: Never    Smokeless tobacco: Never   Substance Use Topics    Alcohol use: Yes     Comment: occ rare beer    Drug use: No       Family History   Adopted: Yes   Problem Relation Age of Onset    Heart disease Father     Colon cancer Neg Hx     Esophageal cancer Neg Hx     Colon polyps Neg Hx     Stomach cancer Neg Hx        Review of Systems  General ROS: negative for - chills, fever or weight loss  Psychological ROS: negative for - hallucination, depression or suicidal ideation  Ophthalmic ROS: negative for - blurry vision, photophobia or eye pain  ENT ROS: negative for - epistaxis, sore throat or rhinorrhea  Respiratory ROS: no cough, shortness of breath, or wheezing  Cardiovascular ROS: no chest pain or dyspnea on exertion  Gastrointestinal ROS: as above  Genito-Urinary ROS: no dysuria, trouble voiding, or hematuria  Musculoskeletal ROS: negative for - arthralgia, myalgia, weakness  Neurological ROS: no syncope or seizures; no ataxia  Dermatological ROS: negative for pruritis, rash and jaundice    Physical Examination  BP (!) 164/77   Pulse 91   Temp 97.4 °F (36.3 °C) (Tympanic)   Resp 16   Ht 6' 1" (1.854 m)   Wt 95.7 kg (211 lb)   SpO2 97%   BMI 27.84 kg/m²   General appearance: " alert, cooperative, no distress  HENT: Normocephalic, atraumatic, neck symmetrical, no nasal discharge   Eyes: conjunctivae/corneas clear, PERRL, EOM's intact, sclera anicteric  Lungs: clear to auscultation bilaterally, no dullness to percussion bilaterally, symmetric expansion, breathing unlabored + hiccups  Heart: regular rate and rhythm without rub; no displacement of the PMI   Abdomen: obese, NT  Extremities: extremities symmetric; no clubbing, cyanosis, or edema  Integument: Skin color, texture, turgor normal; no rashes; hair distrubution normal, no jaundice  Neurologic: Alert and oriented X 3, no focal sensory or motor neurologic deficits  Psychiatric: no pressured speech; normal affect; no evidence of impaired cognition, no anxiety/depression     Labs:  Lab Results   Component Value Date    WBC 4.89 12/04/2023    HGB 10.4 (L) 12/04/2023    HCT 31.4 (L) 12/04/2023    MCV 89 12/04/2023     12/04/2023         CMP  Sodium   Date Value Ref Range Status   12/04/2023 127 (L) 136 - 145 mmol/L Final     Potassium   Date Value Ref Range Status   12/04/2023 4.4 3.5 - 5.1 mmol/L Final     Chloride   Date Value Ref Range Status   12/04/2023 93 (L) 95 - 110 mmol/L Final     CO2   Date Value Ref Range Status   12/04/2023 21 (L) 23 - 29 mmol/L Final     Glucose   Date Value Ref Range Status   12/04/2023 93 70 - 110 mg/dL Final     BUN   Date Value Ref Range Status   12/04/2023 38 (H) 6 - 20 mg/dL Final     Creatinine   Date Value Ref Range Status   12/04/2023 6.7 (H) 0.5 - 1.4 mg/dL Final     Calcium   Date Value Ref Range Status   12/04/2023 8.9 8.7 - 10.5 mg/dL Final     Total Protein   Date Value Ref Range Status   12/04/2023 7.6 6.0 - 8.4 g/dL Final     Albumin   Date Value Ref Range Status   12/04/2023 2.7 (L) 3.5 - 5.2 g/dL Final     Total Bilirubin   Date Value Ref Range Status   12/04/2023 2.1 (H) 0.1 - 1.0 mg/dL Final     Comment:     For infants and newborns, interpretation of results should be based  on  gestational age, weight and in agreement with clinical  observations.    Premature Infant recommended reference ranges:  Up to 24 hours.............<8.0 mg/dL  Up to 48 hours............<12.0 mg/dL  3-5 days..................<15.0 mg/dL  6-29 days.................<15.0 mg/dL       Alkaline Phosphatase   Date Value Ref Range Status   12/04/2023 849 (H) 55 - 135 U/L Final     AST   Date Value Ref Range Status   12/04/2023 65 (H) 10 - 40 U/L Final     ALT   Date Value Ref Range Status   12/04/2023 42 10 - 44 U/L Final     Anion Gap   Date Value Ref Range Status   12/04/2023 13 8 - 16 mmol/L Final     eGFR   Date Value Ref Range Status   12/04/2023 9 (A) >60 mL/min/1.73 m^2 Final         Imaging:  CXR was independently visualized and reviewed by me and showed possible developing RML pneumonia.    I have personally reviewed these images      Case discussed as above    Assessment:   Ineffective esophageal motility  Gastroparesis  N/V  Hiccups  Pneumonia    Plan:  NPO past midnight  EGD tomorrow.  Likely will need follow up with motility specialist at [facility]  Outpatient referral for EBUS as this has not yet been done  Defer management of pneumonia to hospital medicine team.  Further recommendations to follow after above.  Communication will be sent to the referring provider regarding my assessment and plan on this patient via EPIC.      Kash Hua MD  [facility]  [address], [address]  [place] [address]  Office: [phone]  Fax: [phone]

## 2023-12-05 NOTE — ASSESSMENT & PLAN NOTE
No overt abdominal tenderness on exam  US abd  Hepatitis panel  Trend  GI consulted     back of head/head

## 2023-12-05 NOTE — PLAN OF CARE
Problem: Diabetes Comorbidity  Goal: Blood Glucose Level Within Targeted Range  12/5/2023 0244 by Jane Brambila RN  Outcome: Ongoing, Progressing  12/5/2023 0244 by Jane Brambila RN  Outcome: Ongoing, Progressing     Problem: Infection  Goal: Absence of Infection Signs and Symptoms  Outcome: Ongoing, Progressing     Problem: Hemodynamic Instability (Hemodialysis)  Goal: Effective Tissue Perfusion  Outcome: Ongoing, Progressing     Problem: Infection (Hemodialysis)  Goal: Absence of Infection Signs and Symptoms  Outcome: Ongoing, Progressing  Pt remained stable overnight. No acute changes overnight. Norman and ez crackers provided before NPO at midnight. Vitals stable. Antibiotics administered per order. Safety and rounding maintained throughout shift.

## 2023-12-05 NOTE — SUBJECTIVE & OBJECTIVE
Past Medical History:   Diagnosis Date    Diabetes mellitus, type 2     Diabetic foot ulcer associated with diabetes mellitus due to underlying condition 07/16/2020    ESRD on hemodialysis     Hyperlipidemia     Hypertension     Obese        Past Surgical History:   Procedure Laterality Date    AV FISTULA PLACEMENT Left 07/06/2023    Procedure: CREATION, AV FISTULA;  Surgeon: Daniel Poon MD;  Location: [facility];  Service: Vascular;  Laterality: Left;  RN PREOP 6/28/2023  LABS DAY OF SURGERY    BONE BIOPSY Left 07/17/2020    Procedure: BIOPSY, BONE;  Surgeon: Verona Whitmore DPM;  Location: [facility] OR;  Service: Podiatry;  Laterality: Left;    CERVICAL DISC SURGERY  07/11/2016    DEBRIDEMENT Left 07/17/2020    Procedure: DEBRIDEMENT;  Surgeon: Verona Whitmore DPM;  Location: [facility] OR;  Service: Podiatry;  Laterality: Left;    DEBRIDEMENT OF FOOT Right     toes & plantar surface    ESOPHAGEAL MANOMETRY WITH MEASUREMENT OF IMPEDANCE N/A 03/27/2023    Procedure: MANOMETRY, ESOPHAGUS, WITH IMPEDANCE MEASUREMENT;  Surgeon: Roopa Pérez MD;  Location: [facility]);  Service: Endoscopy;  Laterality: N/A;  Belching and rumination protocol please  instructions via portal - sm    ESOPHAGOGASTRODUODENOSCOPY N/A 12/16/2022    Procedure: EGD (ESOPHAGOGASTRODUODENOSCOPY);  Surgeon: Eren Francois MD;  Location: [facility];  Service: Endoscopy;  Laterality: N/A;  Pt. on Dialysis. K+ ordered prior to procedure.EC    FRACTURE SURGERY Right     medial ankle, metal plate present    INSERTION OF TUNNELED CENTRAL VENOUS CATHETER (CVC) WITH SUBCUTANEOUS PORT N/A 06/11/2021    Procedure: TUNNEL CATH INSERTION WITHOUT PORT;  Surgeon: Jose Manuel Diagnostic Provider;  Location: [facility];  Service: Radiology;  Laterality: N/A;  11AM START---PHONE PREOP 6/10/21---COVID POSTIVE ON 7/2020---NO S/S    left leg orthopedic surgery Left     UPPER GASTROINTESTINAL ENDOSCOPY         Review of patient's allergies indicates:  No Known  Allergies    No current facility-administered medications on file prior to encounter.     Current Outpatient Medications on File Prior to Encounter   Medication Sig    hydrALAZINE (APRESOLINE) 100 MG tablet Take 1 tablet (100 mg total) by mouth 3 (three) times daily.    NIFEdipine (PROCARDIA XL) 90 MG (OSM) 24 hr tablet Take 1 tablet (90 mg total) by mouth once daily.    ondansetron (ZOFRAN-ODT) 4 MG TbDL Take 1 tablet (4 mg total) by mouth every 6 (six) hours as needed (nausea and vomiting).    promethazine (PHENERGAN) 12.5 MG Tab Take 1 tablet (12.5 mg total) by mouth every 6 (six) hours as needed (nausea and vomiting).    amitriptyline (ELAVIL) 10 MG tablet Take 1 tablet (10 mg total) by mouth nightly as needed for Pain. (Patient not taking: Reported on 12/4/2023)    aspirin 81 MG Chew Take 1 tablet (81 mg total) by mouth once daily. (Patient not taking: Reported on 12/4/2023)    atorvastatin (LIPITOR) 40 MG tablet Take 1 tablet (40 mg total) by mouth once daily. (Patient not taking: Reported on 12/4/2023)    bethanechol (URECHOLINE) 10 MG Tab Take 1 tablet by mouth 3 (three) times daily.    dulaglutide (TRULICITY) 0.75 mg/0.5 mL pen injector INJECT 0.5 ML UNDER THE SKIN EVERY 7 DAYS (Patient not taking: Reported on 12/4/2023)    esomeprazole (NEXIUM) 40 MG capsule Take 1 capsule (40 mg total) by mouth 2 (two) times daily before meals. (Patient not taking: Reported on 12/4/2023)    gabapentin (NEURONTIN) 100 MG capsule Take 1 capsule (100 mg total) by mouth 3 (three) times daily. (Patient not taking: Reported on 12/4/2023)    heparin sod,pork in 0.45% NaCl (HEPARIN, PORCINE, 100 UNITS) 100 unit/100 mL (1 unit/mL) SolP in sodium chloride 0.45 % 100 mL infusion Inject 1 mL/hr into the vein continuous.    methoxy peg-epoetin beta (MIRCERA INJ) Inject 50 mcg into the skin every 28 days.    pantoprazole (PROTONIX) 40 MG tablet Take 1 tablet by mouth once daily.    sevelamer carbonate (RENVELA) 800 mg Tab Take 1 tablet  (800 mg total) by mouth 3 (three) times daily with meals. (Patient not taking: Reported on 12/4/2023)    sucroferric oxyhydroxide (VELPHORO) 500 mg Chew Take 1 tablet by mouth once daily.    torsemide (DEMADEX) 20 MG Tab Take 1 tablet (20 mg total) by mouth once daily. Take once a day on non-HD days (Patient not taking: Reported on 12/4/2023)     Family History       Problem Relation (Age of Onset)    Heart disease Father          Tobacco Use    Smoking status: Never    Smokeless tobacco: Never   Substance and Sexual Activity    Alcohol use: Yes     Comment: occ rare beer    Drug use: No    Sexual activity: Not Currently     Review of Systems   Constitutional:  Positive for appetite change, fatigue and unexpected weight change. Negative for chills and fever.   Respiratory:  Positive for shortness of breath. Negative for cough.    Cardiovascular:  Negative for chest pain and palpitations.   Gastrointestinal:  Negative for diarrhea, nausea and vomiting.        Hiccups     Skin:  Negative for pallor and rash.   Neurological:  Negative for seizures and syncope.   Psychiatric/Behavioral:  Negative for agitation and confusion. The patient is nervous/anxious.    All other systems reviewed and are negative.    Objective:     Vital Signs (Most Recent):  Temp: 98.8 °F (37.1 °C) (12/04/23 1114)  Pulse: 101 (12/04/23 1755)  Resp: (!) 24 (12/04/23 1755)  BP: (!) 189/92 (12/04/23 1730)  SpO2: 99 % (12/04/23 1755) Vital Signs (24h Range):  Temp:  [98.8 °F (37.1 °C)] 98.8 °F (37.1 °C)  Pulse:  [] 101  Resp:  [18-24] 24  SpO2:  [97 %-100 %] 99 %  BP: (158-202)/(84-98) 189/92     Weight: 95.7 kg (211 lb)  Body mass index is 27.84 kg/m².     Physical Exam  Constitutional:       General: He is not in acute distress.     Appearance: He is well-developed. He is ill-appearing.      Comments: Constant hiccups   HENT:      Head: Normocephalic and atraumatic.   Eyes:      Conjunctiva/sclera: Conjunctivae normal.      Pupils: Pupils are  equal, round, and reactive to light.   Cardiovascular:      Rate and Rhythm: Normal rate and regular rhythm.      Heart sounds: Normal heart sounds.   Pulmonary:      Effort: Pulmonary effort is normal.      Breath sounds: Normal breath sounds.   Chest:      Comments: Has tunneled catheter right chest wall  Abdominal:      General: Bowel sounds are normal.      Palpations: Abdomen is soft.   Musculoskeletal:         General: Normal range of motion.      Cervical back: Normal range of motion and neck supple.      Comments: AV fistula LUE   Skin:     General: Skin is warm and dry.   Neurological:      Mental Status: He is alert and oriented to person, place, and time.   Psychiatric:         Behavior: Behavior normal.              CRANIAL NERVES     CN III, IV, VI   Pupils are equal, round, and reactive to light.       Significant Labs: All pertinent labs within the past 24 hours have been reviewed.  CBC:   Recent Labs   Lab 12/04/23  1302   WBC 4.89   HGB 10.4*   HCT 31.4*        CMP:   Recent Labs   Lab 12/04/23  1302   *   K 4.4   CL 93*   CO2 21*   GLU 93   BUN 38*   CREATININE 6.7*   CALCIUM 8.9   PROT 7.6   ALBUMIN 2.7*   BILITOT 2.1*   ALKPHOS 849*   AST 65*   ALT 42   ANIONGAP 13       Significant Imaging: I have reviewed all pertinent imaging results/findings within the past 24 hours.

## 2023-12-05 NOTE — NURSING
Arrives to room 307 Hypertensive 200/96 manual BP AAOx4 tele monitor in place AV fistula to left AC area, Ambulates to toilet with stand by assistance

## 2023-12-05 NOTE — PLAN OF CARE
[facility] - Med/Surg  Initial Discharge Assessment       Primary Care Provider: No, Primary Doctor    Admission Diagnosis: Hyponatremia [E87.1]  Arteriovenous fistula for hemodialysis in place, primary [Z99.2]  Hiccups [R06.6]    Admission Date: 12/4/2023  Expected Discharge Date: 12/5/2023    Transition of Care Barriers: None    Payor: [employer] BLUE SHIELD / Plan: BLUE CONNECT / Product Type: HMO /     Extended Emergency Contact Information  Primary Emergency Contact: Víctor Mcfadden  Mobile Phone: [phone]  Relation: Sister   needed? No    Discharge Plan A: Home  Discharge Plan B: Home with family      [id number] - [place] - [address] [facility]  [address]  [facility] [address]  Phone: [phone] Fax: [phone]    [facility]/pharmacy #[id number] - [place] - [address]  [address] [facility] [address]  Phone: [phone] Fax: [phone]    [facility] [address]  [address] [place] [address]  Phone: [phone] Fax: [phone]    TRESSA met with patient at bedside to complete discharge planning assessment.  Patient alert and oriented xs 4.  Patient verified all demographic information on facesheet is correct.  Patient verified he has no PCP but goes to [facility] for his healthcare needs.  Patient verified primary health insurance is BCBS.  Patient with NO home health or DME.  Patient with NO POA or Living Will.  Patient goes to [facility] at 0630am but not on medication medication coumadin.  Patient with no 30 day admission.  Patient with no financial issues at this time.  Patient will provide transportation upon discharge from facility.  Patient independent with ADLs, live with friend, drives self.      Initial Assessment (most recent)       Adult Discharge Assessment - 12/05/23 3168          Discharge Assessment     Assessment Type Discharge Planning Assessment     Confirmed/corrected address, phone number and insurance Yes     Confirmed Demographics Correct on Facesheet     Source of Information patient     Does patient/caregiver understand observation status Yes     Communicated WILMER with patient/caregiver Yes     People in Home friend(s)     Facility Arrived From: home     Do you expect to return to your current living situation? Yes     Do you have help at home or someone to help you manage your care at home? Yes     Who are your caregiver(s) and their phone number(s)? self     Prior to hospitilization cognitive status: Alert/Oriented     Current cognitive status: Alert/Oriented     Equipment Currently Used at Home none     Readmission within 30 days? No     Patient currently being followed by outpatient case management? No     Do you currently have service(s) that help you manage your care at home? No     Do you take prescription medications? Yes     Do you have prescription coverage? Yes     Do you have any problems affording any of your prescribed medications? No     Is the patient taking medications as prescribed? yes     Who is going to help you get home at discharge? self     How do you get to doctors appointments? car, drives self     Are you on dialysis? Yes     Dialysis Name and Scheduled days [facility] at 6:30am     Do you take coumadin? No     DME Needed Upon Discharge  none     Discharge Plan discussed with: Patient     Transition of Care Barriers None     Discharge Plan A Home     Discharge Plan B Home with family

## 2023-12-06 LAB
ALBUMIN SERPL BCP-MCNC: 2.3 G/DL (ref 3.5–5.2)
ALP SERPL-CCNC: 814 U/L (ref 55–135)
ALT SERPL W/O P-5'-P-CCNC: 21 U/L (ref 10–44)
ANION GAP SERPL CALC-SCNC: 12 MMOL/L (ref 8–16)
AST SERPL-CCNC: 55 U/L (ref 10–40)
BASOPHILS # BLD AUTO: 0.04 K/UL (ref 0–0.2)
BASOPHILS NFR BLD: 1 % (ref 0–1.9)
BILIRUB SERPL-MCNC: 1.4 MG/DL (ref 0.1–1)
BUN SERPL-MCNC: 26 MG/DL (ref 6–20)
CALCIUM SERPL-MCNC: 8.5 MG/DL (ref 8.7–10.5)
CHLORIDE SERPL-SCNC: 99 MMOL/L (ref 95–110)
CO2 SERPL-SCNC: 20 MMOL/L (ref 23–29)
CREAT SERPL-MCNC: 5.5 MG/DL (ref 0.5–1.4)
DIFFERENTIAL METHOD: ABNORMAL
EOSINOPHIL # BLD AUTO: 0.1 K/UL (ref 0–0.5)
EOSINOPHIL NFR BLD: 2 % (ref 0–8)
ERYTHROCYTE [DISTWIDTH] IN BLOOD BY AUTOMATED COUNT: 15.8 % (ref 11.5–14.5)
EST. GFR  (NO RACE VARIABLE): 11 ML/MIN/1.73 M^2
GLUCOSE SERPL-MCNC: 82 MG/DL (ref 70–110)
HAV IGM SERPL QL IA: NEGATIVE
HBV CORE IGM SERPL QL IA: NEGATIVE
HBV SURFACE AG SERPL QL IA: NEGATIVE
HCT VFR BLD AUTO: 30.4 % (ref 40–54)
HCV IGG SERPL QL IA: NEGATIVE
HGB BLD-MCNC: 9.6 G/DL (ref 14–18)
IMM GRANULOCYTES # BLD AUTO: 0.03 K/UL (ref 0–0.04)
IMM GRANULOCYTES NFR BLD AUTO: 0.8 % (ref 0–0.5)
LYMPHOCYTES # BLD AUTO: 0.6 K/UL (ref 1–4.8)
LYMPHOCYTES NFR BLD: 14.1 % (ref 18–48)
MAGNESIUM SERPL-MCNC: 1.9 MG/DL (ref 1.6–2.6)
MCH RBC QN AUTO: 28.6 PG (ref 27–31)
MCHC RBC AUTO-ENTMCNC: 31.6 G/DL (ref 32–36)
MCV RBC AUTO: 91 FL (ref 82–98)
MONOCYTES # BLD AUTO: 0.6 K/UL (ref 0.3–1)
MONOCYTES NFR BLD: 14.4 % (ref 4–15)
NEUTROPHILS # BLD AUTO: 2.7 K/UL (ref 1.8–7.7)
NEUTROPHILS NFR BLD: 67.7 % (ref 38–73)
NRBC BLD-RTO: 0 /100 WBC
PHOSPHATE SERPL-MCNC: 3.5 MG/DL (ref 2.7–4.5)
PLATELET # BLD AUTO: 237 K/UL (ref 150–450)
PMV BLD AUTO: 9.3 FL (ref 9.2–12.9)
POCT GLUCOSE: 78 MG/DL (ref 70–110)
POCT GLUCOSE: 86 MG/DL (ref 70–110)
POCT GLUCOSE: 88 MG/DL (ref 70–110)
POTASSIUM SERPL-SCNC: 4.4 MMOL/L (ref 3.5–5.1)
PROT SERPL-MCNC: 6.5 G/DL (ref 6–8.4)
RBC # BLD AUTO: 3.36 M/UL (ref 4.6–6.2)
SODIUM SERPL-SCNC: 131 MMOL/L (ref 136–145)
WBC # BLD AUTO: 3.96 K/UL (ref 3.9–12.7)

## 2023-12-06 PROCEDURE — 82900000 HC HEMODIALYSIS 1:1

## 2023-12-06 PROCEDURE — 25000003 PHARM REV CODE 250: Performed by: NURSE PRACTITIONER

## 2023-12-06 PROCEDURE — 99900035 HC TECH TIME PER 15 MIN (STAT)

## 2023-12-06 PROCEDURE — 96376 TX/PRO/DX INJ SAME DRUG ADON: CPT

## 2023-12-06 PROCEDURE — 96375 TX/PRO/DX INJ NEW DRUG ADDON: CPT | Mod: 59

## 2023-12-06 PROCEDURE — G0378 HOSPITAL OBSERVATION PER HR: HCPCS

## 2023-12-06 PROCEDURE — 80053 COMPREHEN METABOLIC PANEL: CPT | Performed by: NURSE PRACTITIONER

## 2023-12-06 PROCEDURE — 94761 N-INVAS EAR/PLS OXIMETRY MLT: CPT

## 2023-12-06 PROCEDURE — 25000003 PHARM REV CODE 250

## 2023-12-06 PROCEDURE — 63600175 PHARM REV CODE 636 W HCPCS: Performed by: NURSE PRACTITIONER

## 2023-12-06 PROCEDURE — 80100014 HC HEMODIALYSIS 1:1

## 2023-12-06 PROCEDURE — C9113 INJ PANTOPRAZOLE SODIUM, VIA: HCPCS | Performed by: NURSE PRACTITIONER

## 2023-12-06 PROCEDURE — 96372 THER/PROPH/DIAG INJ SC/IM: CPT | Performed by: NURSE PRACTITIONER

## 2023-12-06 PROCEDURE — 83735 ASSAY OF MAGNESIUM: CPT | Performed by: NURSE PRACTITIONER

## 2023-12-06 PROCEDURE — G0257 UNSCHED DIALYSIS ESRD PT HOS: HCPCS

## 2023-12-06 PROCEDURE — 63600175 PHARM REV CODE 636 W HCPCS: Performed by: INTERNAL MEDICINE

## 2023-12-06 PROCEDURE — 85025 COMPLETE CBC W/AUTO DIFF WBC: CPT | Performed by: NURSE PRACTITIONER

## 2023-12-06 PROCEDURE — 36415 COLL VENOUS BLD VENIPUNCTURE: CPT | Performed by: NURSE PRACTITIONER

## 2023-12-06 PROCEDURE — 84100 ASSAY OF PHOSPHORUS: CPT | Performed by: NURSE PRACTITIONER

## 2023-12-06 RX ORDER — AMLODIPINE BESYLATE 5 MG/1
5 TABLET ORAL ONCE
Status: COMPLETED | OUTPATIENT
Start: 2023-12-06 | End: 2023-12-06

## 2023-12-06 RX ORDER — PROCHLORPERAZINE EDISYLATE 5 MG/ML
10 INJECTION INTRAMUSCULAR; INTRAVENOUS EVERY 6 HOURS PRN
Status: DISCONTINUED | OUTPATIENT
Start: 2023-12-06 | End: 2023-12-07 | Stop reason: HOSPADM

## 2023-12-06 RX ORDER — HYDRALAZINE HYDROCHLORIDE 25 MG/1
75 TABLET, FILM COATED ORAL ONCE
Status: DISCONTINUED | OUTPATIENT
Start: 2023-12-06 | End: 2023-12-07 | Stop reason: HOSPADM

## 2023-12-06 RX ORDER — SODIUM CHLORIDE 9 MG/ML
INJECTION, SOLUTION INTRAVENOUS ONCE
Status: DISCONTINUED | OUTPATIENT
Start: 2023-12-06 | End: 2023-12-07 | Stop reason: HOSPADM

## 2023-12-06 RX ORDER — AMLODIPINE BESYLATE 5 MG/1
5 TABLET ORAL DAILY
Status: DISCONTINUED | OUTPATIENT
Start: 2023-12-06 | End: 2023-12-06

## 2023-12-06 RX ADMIN — HYDRALAZINE HYDROCHLORIDE 100 MG: 25 TABLET, FILM COATED ORAL at 03:12

## 2023-12-06 RX ADMIN — HYOSCYAMINE SULFATE 0.12 MG: 0.12 TABLET ORAL at 08:12

## 2023-12-06 RX ADMIN — SEVELAMER CARBONATE 800 MG: 800 TABLET, FILM COATED ORAL at 05:12

## 2023-12-06 RX ADMIN — AMLODIPINE BESYLATE 5 MG: 5 TABLET ORAL at 06:12

## 2023-12-06 RX ADMIN — NIFEDIPINE 90 MG: 30 TABLET, FILM COATED, EXTENDED RELEASE ORAL at 08:12

## 2023-12-06 RX ADMIN — MUPIROCIN: 20 OINTMENT TOPICAL at 08:12

## 2023-12-06 RX ADMIN — HYDRALAZINE HYDROCHLORIDE 100 MG: 25 TABLET, FILM COATED ORAL at 09:12

## 2023-12-06 RX ADMIN — PANTOPRAZOLE SODIUM 40 MG: 40 INJECTION, POWDER, LYOPHILIZED, FOR SOLUTION INTRAVENOUS at 08:12

## 2023-12-06 RX ADMIN — MUPIROCIN: 20 OINTMENT TOPICAL at 09:12

## 2023-12-06 RX ADMIN — HYOSCYAMINE SULFATE 0.12 MG: 0.12 TABLET ORAL at 12:12

## 2023-12-06 RX ADMIN — SEVELAMER CARBONATE 800 MG: 800 TABLET, FILM COATED ORAL at 06:12

## 2023-12-06 RX ADMIN — PANTOPRAZOLE SODIUM 40 MG: 40 INJECTION, POWDER, LYOPHILIZED, FOR SOLUTION INTRAVENOUS at 09:12

## 2023-12-06 RX ADMIN — METOCLOPRAMIDE 10 MG: 5 INJECTION, SOLUTION INTRAMUSCULAR; INTRAVENOUS at 12:12

## 2023-12-06 RX ADMIN — HEPARIN SODIUM 5000 UNITS: 5000 INJECTION INTRAVENOUS; SUBCUTANEOUS at 10:12

## 2023-12-06 RX ADMIN — HEPARIN SODIUM 5000 UNITS: 5000 INJECTION INTRAVENOUS; SUBCUTANEOUS at 06:12

## 2023-12-06 RX ADMIN — HEPARIN SODIUM 4000 UNITS: 1000 INJECTION, SOLUTION INTRAVENOUS; SUBCUTANEOUS at 03:12

## 2023-12-06 RX ADMIN — AMITRIPTYLINE HYDROCHLORIDE 10 MG: 10 TABLET, FILM COATED ORAL at 09:12

## 2023-12-06 RX ADMIN — HYDRALAZINE HYDROCHLORIDE 10 MG: 20 INJECTION INTRAMUSCULAR; INTRAVENOUS at 05:12

## 2023-12-06 RX ADMIN — SEVELAMER CARBONATE 800 MG: 800 TABLET, FILM COATED ORAL at 11:12

## 2023-12-06 RX ADMIN — DIPHENOXYLATE HYDROCHLORIDE AND ATROPINE SULFATE 1 TABLET: 2.5; .025 TABLET ORAL at 03:12

## 2023-12-06 RX ADMIN — HYDRALAZINE HYDROCHLORIDE 100 MG: 25 TABLET, FILM COATED ORAL at 08:12

## 2023-12-06 RX ADMIN — HEPARIN SODIUM 5000 UNITS: 5000 INJECTION INTRAVENOUS; SUBCUTANEOUS at 03:12

## 2023-12-06 RX ADMIN — PROCHLORPERAZINE EDISYLATE 10 MG: 5 INJECTION INTRAMUSCULAR; INTRAVENOUS at 06:12

## 2023-12-06 NOTE — PROGRESS NOTES
Critical access hospital Medicine  Progress Note    Patient Name: Catalino Mcfadden  MRN: [id number]  Patient Class: OP- Observation   Admission Date: 12/4/2023  Length of Stay: 0 days  Attending Physician: Roger Joshua MD  Primary Care Provider: Lizbeth, Primary Doctor        Subjective:     Principal Problem:Intractable hiccups        HPI:  Catalino Mcfadden is a 59 y/o M with past medical history significant for HTN, ESRD on HD, DM2, gastroparesis and HLD who presented to the ED for further evaluation of chronic hiccups which has been ongoing x 2 years but has acutely worsened over the past couple of days.  He reports difficulty eating, sleeping, speaking and breathing.  Thus far, there have been no relieving factors.  He has tried and failed multiple treatments to include PPIs, Phenergan, Zofran, baclofen, gabapentin, Reglan, Thorazine, imipramine, bethanechol, and Elavil.  He has been seen by GI specialty and is currently undergoing workup.  Also had ambulatory referral to pulmonology due to abnormal CT scan which identified upper lobe predominant Alicia lymphatic and peribronchovascular micro nodular lung opacities and abnormal appearance of the liver.  Possible etiologies for these findings include metastatic disease although an infectious/inflammatory etiology such as granulomatous disease/sarcoidosis could have this appearance.  He was seen and determined to be a candidate for EBUS, but apparently he was lost to follow up.  It appears that he has lost about 35 pounds since March of this year, upon review of the record.   He was given zyprexa while in the emergency department with no relief of symptoms.  Majority of history is obtained through review of the medical record.  He has constant hiccups; therefore, verbal communication is limited.  He is placed in observation under the service of hospital medicine for continued monitoring and treatment.    Overview/Hospital Course:  Patient monitored closely during  hospitalization.  Pulmonary consulted and recommends outpatient follow-up with pulmonology for EUS to evaluate pulmonary lesion and questionable sarcoidosis.  GI consulted patient underwent upper GI without any significant findings.  Patient is medically stable for discharge from a GI standpoint.  He reports his hiccups have improved but do persist.  Patient underwent hemodialysis and was noted to have hypertension post dialysis and received 2 doses of IV hydralazine.  Patient is medically stable for discharge.    Interval History:  Notes reviewed, patient continues with uncontrolled hypertension during the night and required several doses of IV hydralazine.  Patient's blood pressure remains elevated today.  Discussed with Nephrology and will plan for additional dialysis treatment today and will continue with p.r.n. hydralazine.  If patient's blood pressure improves after dialysis will consider discharging home.    Review of Systems   Constitutional:  Negative for chills, fatigue and fever.   Respiratory:  Negative for cough and shortness of breath.    Cardiovascular:  Negative for chest pain.   Gastrointestinal:  Negative for abdominal pain, diarrhea, nausea and vomiting.        Hiccups   Skin:  Negative for rash.   All other systems reviewed and are negative.    Objective:     Vital Signs (Most Recent):  Temp: 98.8 °F (37.1 °C) (12/06/23 1230)  Pulse: 82 (12/06/23 1530)  Resp: 18 (12/06/23 1230)  BP: (!) 200/103 (12/06/23 1530)  SpO2: 96 % (12/06/23 1124) Vital Signs (24h Range):  Temp:  [98.2 °F (36.8 °C)-99.7 °F (37.6 °C)] 98.8 °F (37.1 °C)  Pulse:  [] 82  Resp:  [16-20] 18  SpO2:  [95 %-99 %] 96 %  BP: (174-217)/() 200/103     Weight: 102.1 kg (225 lb)  Body mass index is 29.69 kg/m².    Intake/Output Summary (Last 24 hours) at 12/6/2023 1615  Last data filed at 12/5/2023 1715  Gross per 24 hour   Intake 860 ml   Output 4500 ml   Net -3640 ml         Physical Exam  Vitals and nursing note reviewed.    Constitutional:       General: He is not in acute distress.     Appearance: Normal appearance. He is well-developed. He is not ill-appearing or diaphoretic.   HENT:      Head: Normocephalic and atraumatic.      Right Ear: External ear normal.      Left Ear: External ear normal.      Nose: Nose normal. No congestion or rhinorrhea.      Mouth/Throat:      Mouth: Mucous membranes are moist.      Pharynx: Oropharynx is clear. No oropharyngeal exudate or posterior oropharyngeal erythema.   Eyes:      General: No scleral icterus.     Conjunctiva/sclera: Conjunctivae normal.      Pupils: Pupils are equal, round, and reactive to light.   Neck:      Vascular: No JVD.   Cardiovascular:      Rate and Rhythm: Normal rate and regular rhythm.      Pulses: Normal pulses.      Heart sounds: Normal heart sounds. No murmur heard.  Pulmonary:      Effort: Pulmonary effort is normal. No respiratory distress.      Breath sounds: Normal breath sounds. No stridor. No wheezing, rhonchi or rales.   Abdominal:      General: Bowel sounds are normal. There is no distension.      Palpations: Abdomen is soft.      Tenderness: There is no abdominal tenderness.   Musculoskeletal:         General: No swelling or tenderness. Normal range of motion.      Cervical back: Normal range of motion and neck supple.   Skin:     General: Skin is warm and dry.      Capillary Refill: Capillary refill takes 2 to 3 seconds.      Coloration: Skin is not jaundiced or pale.      Findings: No erythema.   Neurological:      General: No focal deficit present.      Mental Status: He is alert and oriented to person, place, and time.      Cranial Nerves: No cranial nerve deficit.      Sensory: No sensory deficit.   Psychiatric:         Mood and Affect: Mood normal.         Behavior: Behavior normal.         Thought Content: Thought content normal.             Significant Labs: All pertinent labs within the past 24 hours have been reviewed.  CBC:   Recent Labs   Lab  12/05/23  0500 12/06/23  0443   WBC 3.63* 3.96   HGB 9.5* 9.6*   HCT 28.9* 30.4*    237     CMP:   Recent Labs   Lab 12/05/23  0500 12/06/23  0443   * 131*   K 5.0 4.4   CL 98 99   CO2 19* 20*   GLU 74 82   BUN 43* 26*   CREATININE 7.5* 5.5*   CALCIUM 8.5* 8.5*   PROT 6.6 6.5   ALBUMIN 2.2* 2.3*   BILITOT 1.6* 1.4*   ALKPHOS 719* 814*   AST 45* 55*   ALT 29 21   ANIONGAP 13 12       Significant Imaging: I have reviewed all pertinent imaging results/findings within the past 24 hours.    Assessment/Plan:      * Intractable hiccups  Etiology unclear.  Has tried and failed an extensive list of medications.  Will place him back on PPI as per most recent GI note  Consult GI-plan EGD today-- no significant findings  Continue nightly elavil    Will refill home elavil and add thorazine.       Serum total bilirubin elevated  No overt abdominal tenderness on exam  US abd  Hepatitis panel  Trend  GI consulted      Calcified granuloma of lung  With possible sarcoidosis. Pulmonary consulted. Recommends follow up with Dr. Huynh in 2 weeks for EBUS.       ESRD on hemodialysis  Chronic; consult nephrology for routine dialysis TTS    Hyponatremia  Patient has hyponatremia which is uncontrolled,We will aim to correct the sodium by 4-6mEq in 24 hours. We will monitor sodium Daily. The hyponatremia is due to Dehydration/hypovolemia. We will treat the hyponatremia with IV fluids as follows: IVF bolus NS-caution due to ESRD. The patient's sodium results have been reviewed and are listed below.  Recent Labs   Lab 12/06/23 0443   *       Nephrology consulted    Hyperlipidemia, acquired   Patient is chronically on statin.will continue for now. Monitor clinically. Last LDL was   Lab Results   Component Value Date    LDLCALC 124.8 02/22/2023            Essential hypertension  Chronic, uncontrolled. Latest blood pressure and vitals reviewed-     Temp:  [97.4 °F (36.3 °C)-98.8 °F (37.1 °C)]   Pulse:  []   Resp:  [16-24]  "  BP: (158-202)/(77-98)   SpO2:  [97 %-100 %] .   Home meds for hypertension were reviewed and noted below.   Hypertension Medications               hydrALAZINE (APRESOLINE) 100 MG tablet Take 1 tablet (100 mg total) by mouth 3 (three) times daily.    NIFEdipine (PROCARDIA XL) 90 MG (OSM) 24 hr tablet Take 1 tablet (90 mg total) by mouth once daily.    torsemide (DEMADEX) 20 MG Tab Take 1 tablet (20 mg total) by mouth once daily. Take once a day on non-HD days            While in the hospital, will manage blood pressure as follows; Continue home antihypertensive regimen    Will utilize p.r.n. blood pressure medication only if patient's blood pressure greater than 180/110 and he develops symptoms such as worsening chest pain or shortness of breath.    Type 2 diabetes mellitus with stage 5 chronic kidney disease  Patient's FSGs are controlled on current medication regimen.  Last A1c reviewed-   Lab Results   Component Value Date    HGBA1C 5.1 02/22/2023     Most recent fingerstick glucose reviewed- No results for input(s): "POCTGLUCOSE" in the last 24 hours.  Current correctional scale  Medium  Maintain anti-hyperglycemic dose as follows-   Antihyperglycemics (From admission, onward)      Start     Stop Route Frequency Ordered    12/04/23 1827  insulin aspart U-100 pen 1-10 Units         -- SubQ Before meals & nightly PRN 12/04/23 1827          Hold Oral hypoglycemics while patient is in the hospital.      VTE Risk Mitigation (From admission, onward)           Ordered     heparin (porcine) injection 4,000 Units  As needed (PRN)         12/05/23 1519     heparin (porcine) injection 5,000 Units  Every 8 hours         12/04/23 1827     IP VTE HIGH RISK PATIENT  Once         12/04/23 1827     Place sequential compression device  Until discontinued         12/04/23 1827                    Discharge Planning   WILMER: 12/8/2023     Code Status: Full Code   Is the patient medically ready for discharge?:     Reason for patient " still in hospital (select all that apply): Treatment  Discharge Plan A: Home   Discharge Delays: None known at this time              Yohana Pardo NP  Department of Hospital Medicine   [facility]/Surg

## 2023-12-06 NOTE — SUBJECTIVE & OBJECTIVE
Interval History:  Notes reviewed, patient continues with uncontrolled hypertension during the night and required several doses of IV hydralazine.  Patient's blood pressure remains elevated today.  Discussed with Nephrology and will plan for additional dialysis treatment today and will continue with p.r.n. hydralazine.  If patient's blood pressure improves after dialysis will consider discharging home.    Review of Systems   Constitutional:  Negative for chills, fatigue and fever.   Respiratory:  Negative for cough and shortness of breath.    Cardiovascular:  Negative for chest pain.   Gastrointestinal:  Negative for abdominal pain, diarrhea, nausea and vomiting.        Hiccups   Skin:  Negative for rash.   All other systems reviewed and are negative.    Objective:     Vital Signs (Most Recent):  Temp: 98.8 °F (37.1 °C) (12/06/23 1230)  Pulse: 82 (12/06/23 1530)  Resp: 18 (12/06/23 1230)  BP: (!) 200/103 (12/06/23 1530)  SpO2: 96 % (12/06/23 1124) Vital Signs (24h Range):  Temp:  [98.2 °F (36.8 °C)-99.7 °F (37.6 °C)] 98.8 °F (37.1 °C)  Pulse:  [] 82  Resp:  [16-20] 18  SpO2:  [95 %-99 %] 96 %  BP: (174-217)/() 200/103     Weight: 102.1 kg (225 lb)  Body mass index is 29.69 kg/m².    Intake/Output Summary (Last 24 hours) at 12/6/2023 1615  Last data filed at 12/5/2023 1715  Gross per 24 hour   Intake 860 ml   Output 4500 ml   Net -3640 ml         Physical Exam  Vitals and nursing note reviewed.   Constitutional:       General: He is not in acute distress.     Appearance: Normal appearance. He is well-developed. He is not ill-appearing or diaphoretic.   HENT:      Head: Normocephalic and atraumatic.      Right Ear: External ear normal.      Left Ear: External ear normal.      Nose: Nose normal. No congestion or rhinorrhea.      Mouth/Throat:      Mouth: Mucous membranes are moist.      Pharynx: Oropharynx is clear. No oropharyngeal exudate or posterior oropharyngeal erythema.   Eyes:      General: No  scleral icterus.     Conjunctiva/sclera: Conjunctivae normal.      Pupils: Pupils are equal, round, and reactive to light.   Neck:      Vascular: No JVD.   Cardiovascular:      Rate and Rhythm: Normal rate and regular rhythm.      Pulses: Normal pulses.      Heart sounds: Normal heart sounds. No murmur heard.  Pulmonary:      Effort: Pulmonary effort is normal. No respiratory distress.      Breath sounds: Normal breath sounds. No stridor. No wheezing, rhonchi or rales.   Abdominal:      General: Bowel sounds are normal. There is no distension.      Palpations: Abdomen is soft.      Tenderness: There is no abdominal tenderness.   Musculoskeletal:         General: No swelling or tenderness. Normal range of motion.      Cervical back: Normal range of motion and neck supple.   Skin:     General: Skin is warm and dry.      Capillary Refill: Capillary refill takes 2 to 3 seconds.      Coloration: Skin is not jaundiced or pale.      Findings: No erythema.   Neurological:      General: No focal deficit present.      Mental Status: He is alert and oriented to person, place, and time.      Cranial Nerves: No cranial nerve deficit.      Sensory: No sensory deficit.   Psychiatric:         Mood and Affect: Mood normal.         Behavior: Behavior normal.         Thought Content: Thought content normal.             Significant Labs: All pertinent labs within the past 24 hours have been reviewed.  CBC:   Recent Labs   Lab 12/05/23  0500 12/06/23  0443   WBC 3.63* 3.96   HGB 9.5* 9.6*   HCT 28.9* 30.4*    237     CMP:   Recent Labs   Lab 12/05/23  0500 12/06/23  0443   * 131*   K 5.0 4.4   CL 98 99   CO2 19* 20*   GLU 74 82   BUN 43* 26*   CREATININE 7.5* 5.5*   CALCIUM 8.5* 8.5*   PROT 6.6 6.5   ALBUMIN 2.2* 2.3*   BILITOT 1.6* 1.4*   ALKPHOS 719* 814*   AST 45* 55*   ALT 29 21   ANIONGAP 13 12       Significant Imaging: I have reviewed all pertinent imaging results/findings within the past 24 hours.

## 2023-12-06 NOTE — NURSING
Called NP Oriana Taylor re d/c orders for tonite and concern that BP has required IVP Hydralazine x2 this evening.  Patient also voiced concern over  elevated BP and going home tonite. Will defer discharge to attending in AM per Oriana Taylor NP. Michaela, primary nurse and patient informed.

## 2023-12-06 NOTE — PROGRESS NOTES
3000 ml net uf removed   12/06/23 1550   Required for all Hemodialysis Patients   Hepatitis Status negative   Handoff Report   Received From Arely   Given To Alek   Treatment Type   Treatment Type Maintenance   Vital Signs   Temp 98 °F (36.7 °C)   Pulse 92   Resp 18   BP (!) 202/102   Assessments (Pre/Post)   Date Hepatitis Profile Obtained 11/14/23   Blood Liters Processed (BLP) 60.9   Transport Modality ambulatory   Level of Consciousness (AVPU) alert   Dialyzer Clearance mildly streaked   Pain   Preferred Pain Scale number (Numeric Rating Pain Scale)   Pain Rating (0-10): Rest 0        Hemodialysis Catheter right subclavian   No placement date or time found.   Present Prior to Hospital Arrival?: Yes  Hemodialysis Catheter Type: Tunneled catheter  Location: right subclavian  Placement Verification: Blood return   Line Necessity Review CRRT/HD   Site Assessment No drainage;No redness;No swelling;No warmth   Line Securement Device Secured with sutures   Dressing Type CHG impregnated dressing/sponge;Central line dressing   Dressing Status Clean;Dry;Intact   Dressing Intervention Integrity maintained   Date on Dressing 12/06/23   Dressing Due to be Changed 12/12/23   Venous Patency/Care flushed w/o difficulty;heparin locked;deaccessed   Arterial Patency/Care flushed w/o difficulty;heparin locked;deaccessed   Post-Hemodialysis Assessment   Rinseback Volume (mL) 250 mL   Blood Volume Processed (Liters) 60.9 L   Dialyzer Clearance Lightly streaked   Duration of Treatment 180 minutes   Additional Fluid Intake (mL) 500 mL   Total UF (mL) 3500 mL   Net Fluid Removal 3000   Patient Response to Treatment tolerated well   Post-Treatment Weight 99.1 kg (218 lb 7.6 oz)   Treatment Weight Change -3   Post-Hemodialysis Comments tx completed     Educated on fluid intake

## 2023-12-06 NOTE — CONSULTS
Nephrology Consult Note        Patient Name: Catalino Mcfadden  MRN: [id number]    Patient Class: OP- Observation   Admission Date: 12/4/2023  Length of Stay: 0 days  Date of Service: 12/6/2023    Attending Physician: Roger Joshua MD  Primary Care Provider: Lizbeth, Primary Doctor    Reason for Consult: esrd    SUBJECTIVE:     HPI: 58-year-old man with a history of type 2 diabetes, gastroparesis end-stage renal disease on hemodialysis, Tuesday Thursday Saturday, hypertension, hyperlipidemia and chronic hiccups who presents emergency department complaining of intractable hiccups for 2 years.  States his symptoms are worsening and having difficulty eating, sleeping, breathing.  Hiccups are spontaneous and lasts for days on end.  He has tried and failed treatment with multiple medications including PPIs, Phenergan, Zofran, baclofen, gabapentin, Reglan, Thorazine, imipramine, bethanechol, Elavil.  He is being referred to GI specialist.     12/6 VSS. HD today. Ok to dc after HD if BP is controlled...    Past Medical History:   Diagnosis Date    Diabetes mellitus, type 2     Diabetic foot ulcer associated with diabetes mellitus due to underlying condition 07/16/2020    ESRD on hemodialysis     Hyperlipidemia     Hypertension     Obese      Past Surgical History:   Procedure Laterality Date    AV FISTULA PLACEMENT Left 07/06/2023    Procedure: CREATION, AV FISTULA;  Surgeon: Daniel Poon MD;  Location: [facility] OR;  Service: Vascular;  Laterality: Left;  RN PREOP 6/28/2023  LABS DAY OF SURGERY    BONE BIOPSY Left 07/17/2020    Procedure: BIOPSY, BONE;  Surgeon: Verona Whitmore DPM;  Location: [facility] OR;  Service: Podiatry;  Laterality: Left;    CERVICAL DISC SURGERY  07/11/2016    DEBRIDEMENT Left 07/17/2020    Procedure: DEBRIDEMENT;  Surgeon: Verona Whitmore DPM;  Location: [facility] OR;  Service: Podiatry;  Laterality: Left;    DEBRIDEMENT OF FOOT Right     toes & plantar surface    ESOPHAGEAL MANOMETRY WITH MEASUREMENT OF IMPEDANCE  N/A 03/27/2023    Procedure: MANOMETRY, ESOPHAGUS, WITH IMPEDANCE MEASUREMENT;  Surgeon: Roopa Pérez MD;  Location: [facility] ENDO (4TH FLR);  Service: Endoscopy;  Laterality: N/A;  Belching and rumination protocol please  instructions via portal - sm    ESOPHAGOGASTRODUODENOSCOPY N/A 12/16/2022    Procedure: EGD (ESOPHAGOGASTRODUODENOSCOPY);  Surgeon: Eren Francois MD;  Location: [facility] ENDO;  Service: Endoscopy;  Laterality: N/A;  Pt. on Dialysis. K+ ordered prior to procedure.EC    ESOPHAGOGASTRODUODENOSCOPY N/A 12/5/2023    Procedure: EGD (ESOPHAGOGASTRODUODENOSCOPY);  Surgeon: Kash Johnston MD;  Location: [facility];  Service: Endoscopy;  Laterality: N/A;    FRACTURE SURGERY Right     medial ankle, metal plate present    INSERTION OF TUNNELED CENTRAL VENOUS CATHETER (CVC) WITH SUBCUTANEOUS PORT N/A 06/11/2021    Procedure: TUNNEL CATH INSERTION WITHOUT PORT;  Surgeon: Jose Manuel Diagnostic Provider;  Location: [facility] OR;  Service: Radiology;  Laterality: N/A;  11AM START---PHONE PREOP 6/10/21---COVID POSTIVE ON 7/2020---NO S/S    left leg orthopedic surgery Left     UPPER GASTROINTESTINAL ENDOSCOPY       Family History   Adopted: Yes   Problem Relation Age of Onset    Heart disease Father     Colon cancer Neg Hx     Esophageal cancer Neg Hx     Colon polyps Neg Hx     Stomach cancer Neg Hx      Social History     Tobacco Use    Smoking status: Never    Smokeless tobacco: Never   Substance Use Topics    Alcohol use: Yes     Comment: occ rare beer    Drug use: No       Review of patient's allergies indicates:  No Known Allergies    Outpatient meds:  No current facility-administered medications on file prior to encounter.     Current Outpatient Medications on File Prior to Encounter   Medication Sig Dispense Refill    hydrALAZINE (APRESOLINE) 100 MG tablet Take 1 tablet (100 mg total) by mouth 3 (three) times daily. 90 tablet 0    NIFEdipine (PROCARDIA XL) 90 MG (OSM) 24 hr tablet Take 1 tablet (90 mg total) by  mouth once daily. 90 tablet 3    ondansetron (ZOFRAN-ODT) 4 MG TbDL Take 1 tablet (4 mg total) by mouth every 6 (six) hours as needed (nausea and vomiting). 30 tablet 3    promethazine (PHENERGAN) 12.5 MG Tab Take 1 tablet (12.5 mg total) by mouth every 6 (six) hours as needed (nausea and vomiting). 30 tablet 3    aspirin 81 MG Chew Take 1 tablet (81 mg total) by mouth once daily. (Patient not taking: Reported on 12/4/2023) 30 tablet 0    atorvastatin (LIPITOR) 40 MG tablet Take 1 tablet (40 mg total) by mouth once daily. (Patient not taking: Reported on 12/4/2023) 30 tablet 0    bethanechol (URECHOLINE) 10 MG Tab Take 1 tablet by mouth 3 (three) times daily.      gabapentin (NEURONTIN) 100 MG capsule Take 1 capsule (100 mg total) by mouth 3 (three) times daily. (Patient not taking: Reported on 12/4/2023) 90 capsule 11    heparin sod,pork in 0.45% NaCl (HEPARIN, PORCINE, 100 UNITS) 100 unit/100 mL (1 unit/mL) SolP in sodium chloride 0.45 % 100 mL infusion Inject 1 mL/hr into the vein continuous.      methoxy peg-epoetin beta (MIRCERA INJ) Inject 50 mcg into the skin every 28 days.      pantoprazole (PROTONIX) 40 MG tablet Take 1 tablet by mouth once daily.      sevelamer carbonate (RENVELA) 800 mg Tab Take 1 tablet (800 mg total) by mouth 3 (three) times daily with meals. (Patient not taking: Reported on 12/4/2023) 90 tablet 11    sucroferric oxyhydroxide (VELPHORO) 500 mg Chew Take 1 tablet by mouth once daily.         Scheduled meds:   amitriptyline  10 mg Oral QHS    epoetin jose-epbx  50 Units/kg Intravenous Every Tues, Thurs, Sat    heparin (porcine)  5,000 Units Subcutaneous Q8H    hydrALAZINE  100 mg Oral Q8H    mupirocin   Nasal BID    NIFEdipine  90 mg Oral Daily    pantoprazole  40 mg Intravenous BID    sevelamer carbonate  800 mg Oral TID WM       Infusions:   sodium chloride 0.9%         PRN meds:  acetaminophen, albuterol-ipratropium, aluminum-magnesium hydroxide-simethicone, dextrose 10%, dextrose 10%,  diphenoxylate-atropine 2.5-0.025 mg, glucagon (human recombinant), glucose, glucose, heparin (porcine), hydrALAZINE, hyoscyamine, insulin aspart U-100, LORazepam, magnesium oxide, magnesium oxide, melatonin, naloxone, potassium bicarbonate, potassium bicarbonate, potassium bicarbonate, potassium, sodium phosphates, potassium, sodium phosphates, potassium, sodium phosphates, prochlorperazine, promethazine (PHENERGAN) 12.5 mg in dextrose 5 % (D5W) 50 mL IVPB, simethicone, sodium chloride 0.9%    Review of Systems:  Constitutional:  Negative for chills, fever, malaise/fatigue and weight loss.   HENT:  Negative for hearing loss and nosebleeds.    Eyes:  Negative for blurred vision, double vision and photophobia.   Respiratory:  Negative for cough, shortness of breath and wheezing.    Cardiovascular:  Negative for chest pain, palpitations and leg swelling.   Gastrointestinal:  Negative for abdominal pain, constipation, diarrhea, heartburn, nausea and vomiting.   Genitourinary:  Negative for dysuria, frequency and urgency.   Musculoskeletal:  Negative for falls, joint pain and myalgias.   Skin:  Negative for itching and rash.   Neurological:  Negative for dizziness, speech change, focal weakness, loss of consciousness and headaches.   Endo/Heme/Allergies:  Does not bruise/bleed easily.   Psychiatric/Behavioral:  Negative for depression and substance abuse. The patient is not nervous/anxious.      OBJECTIVE:     Vital Signs and IO:  Temp:  [98 °F (36.7 °C)-99.7 °F (37.6 °C)]   Pulse:  []   Resp:  [14-20]   BP: (174-214)/()   SpO2:  [95 %-99 %]   I/O last 3 completed shifts:  In: 1010 [P.O.:360; I.V.:150; Other:500]  Out: 4500 [Other:4500]  Wt Readings from Last 5 Encounters:   12/05/23 102.1 kg (225 lb)   12/01/23 96.1 kg (211 lb 13.8 oz)   11/15/23 99.8 kg (220 lb 0.3 oz)   09/05/23 99.8 kg (220 lb)   08/28/23 102 kg (224 lb 15.7 oz)     Body mass index is 29.69 kg/m².    Physical Exam  Constitutional:        General: Patient is not in acute distress.     Appearance: Patient is well-developed. She is not diaphoretic.   HENT:      Head: Normocephalic and atraumatic.      Mouth/Throat: Mucous membranes are moist.   Eyes:      General: No scleral icterus.     Pupils: Pupils are equal, round, and reactive to light.   Cardiovascular:      Rate and Rhythm: Normal rate and regular rhythm.   Pulmonary:      Effort: Pulmonary effort is normal. No respiratory distress.      Breath sounds: No stridor.   Abdominal:      General: There is no distension.      Palpations: Abdomen is soft.   Musculoskeletal:         General: No deformity. Normal range of motion.      Cervical back: Neck supple.   Skin:     General: Skin is warm and dry.      Findings: No rash present. No erythema.   Neurological:      Mental Status: Patient is alert and oriented to person, place, and time.      Cranial Nerves: No cranial nerve deficit.   Psychiatric:         Behavior: Behavior normal.     Laboratory:  Recent Labs   Lab 12/04/23  1302 12/05/23  0500 12/06/23  0443   * 130* 131*   K 4.4 5.0 4.4   CL 93* 98 99   CO2 21* 19* 20*   BUN 38* 43* 26*   CREATININE 6.7* 7.5* 5.5*   GLU 93 74 82         Recent Labs   Lab 12/04/23  1302 12/05/23  0500 12/06/23  0443   CALCIUM 8.9 8.5* 8.5*   ALBUMIN 2.7* 2.2* 2.3*   PHOS  --  5.7* 3.5   MG  --  2.0 1.9         Recent Labs   Lab 02/23/22  0758   PTH, Intact 218.0 H         Recent Labs   Lab 12/04/23  2123 12/05/23  0735 12/05/23  1654 12/05/23  2153 12/06/23  1118   POCTGLUCOSE 70 85 67* 76 86         Recent Labs   Lab 02/09/22  0722 02/23/22  0758 02/22/23  0653   Hemoglobin A1C 5.0 5.0 5.1         Recent Labs   Lab 12/04/23  1302 12/05/23  0500 12/06/23  0443   WBC 4.89 3.63* 3.96   HGB 10.4* 9.5* 9.6*   HCT 31.4* 28.9* 30.4*    218 237   MCV 89 89 91   MCHC 33.1 32.9 31.6*   MONO 16.2*  0.8 15.2*  0.6 14.4  0.6   EOSINOPHIL 2.2 3.3 2.0         Recent Labs   Lab 12/04/23  1302 12/05/23  0500  12/06/23  0443   BILITOT 2.1* 1.6* 1.4*   PROT 7.6 6.6 6.5   ALBUMIN 2.7* 2.2* 2.3*   ALKPHOS 849* 719* 814*   ALT 42 29 21   AST 65* 45* 55*         Recent Labs   Lab 07/27/21  1624   Color, UA Yellow   Appearance, UA Cloudy A   pH, UA 5.0   Specific Gravity, UA 1.010   Protein, UA 3+ A   Glucose, UA 1+ A   Ketones, UA Negative   Bilirubin (UA) Negative   Occult Blood UA Negative   Nitrite, UA Negative   RBC, UA 1   WBC, UA 3   Bacteria Occasional   Hyaline Casts, UA 0         Recent Labs   Lab 09/24/22  1355   Sample VENOUS         Microbiology Results (last 7 days)       ** No results found for the last 168 hours. **            ASSESSMENT/PLAN:     ESRD on HD TTS via AVF  Next HD per schedule.  Continue current dialysis prescription.  Renal diet - low K, low phos.  No IVs or BP checks on access and/or non-dominant arm.    Anemia of CKD  Hgb and HCT are acceptable. Monitor for now.  Will provide CLEMENT and/or IV iron PRN.    MBD / Secondary HPT  Ca, Phos, PTH and vitamin D levels are acceptable.   Phos binders, vitamin D and analogues, calcimimetics will be given as needed.    HTN  BP seem uncontrolled. More HD today.  Tolerate asymptomatic HTN up to -160.  Continue home meds.  Low sodium diet.    Thank you for allowing us to participate in the care of your patient!   We will follow the patient and provide recommendations as needed.    Patient care time was spent personally by me on the following activities:     Obtaining a history.  Examination of patient.  Providing medical care at the patients bedside.  Developing a treatment plan with patient or surrogate and bedside caregivers.  Ordering and reviewing laboratory studies, radiographic studies, pulse oximetry.  Ordering and performing treatments and interventions.  Evaluation of patient's response to treatment.  Discussions with consultants while on the unit and immediately available to the patient.  Re-evaluation of the patient's condition.  Documentation  in the medical record.     Juan Waters MD    [facility]  [address] [place] [id number]    [phone] - tel  [phone] - fax    12/6/2023

## 2023-12-06 NOTE — HOSPITAL COURSE
Patient was monitored closely during his hospitalization.  Pulmonary consulted and recommends outpatient follow-up with pulmonology for EUS to evaluate pulmonary lesion and questionable sarcoidosis.  GI consulted and patient underwent upper GI without any significant findings.  Patient was medically stable for discharge from a GI standpoint.  He reports his hiccups improved but do persist and he was started on baclofen TID.  Patient underwent hemodialysis and was noted to have hypertension post dialysis and required IV hydralazine prn. Nephrology rec HD x 3 consecutive days.  Patient underwent dialysis on 12/5, 12/6 and 12/7.  His blood pressure significantly improved after his last hemodialysis treatment and he remained stable.  Patient was cleared for discharge.  Patient verbalized understanding discharge instructions and return precautions.

## 2023-12-06 NOTE — ASSESSMENT & PLAN NOTE
With possible sarcoidosis. Pulmonary consulted. Recommends follow up with Dr. Huynh in 2 weeks for EBUS.

## 2023-12-06 NOTE — PLAN OF CARE
[facility] - Med/Surg  Discharge Reassessment    Primary Care Provider: Lizbeth, Primary Doctor    Expected Discharge Date: 12/7/2023    Patient not medically ready for discharge today possible tomorrow due to blood pressure.  Per NP note, patient continues with uncontrolled hypertension during the night and required several doses of IV hydralazine.  Patient's blood pressure remains elevated today. Discussed with Nephrology and will plan for additional dialysis treatment today and will continue with p.r.n. hydralazine.  CM following for discharge planning needs.    Reassessment (most recent)       Discharge Reassessment - 12/06/23 1400          Discharge Reassessment    Assessment Type Discharge Planning Reassessment (P)      Did the patient's condition or plan change since previous assessment? Yes (P)      Discharge Plan discussed with: Patient (P)      Communicated WILMER with patient/caregiver Yes (P)      Discharge Plan A Home (P)      Discharge Plan B Home with family (P)      DME Needed Upon Discharge  none (P)      Transition of Care Barriers None (P)      Why the patient remains in the hospital Requires continued medical care (P)         Post-Acute Status    Post-Acute Authorization Other (P)      Other Status No Post-Acute Service Needs (P)      Discharge Delays None known at this time (P)

## 2023-12-06 NOTE — SUBJECTIVE & OBJECTIVE
Interval History:  Patient I underwent upper GI today which is negative for significant findings.  Reviewed pulmonary note who recommends outpatient follow-up for EB EUS.  Labs reviewed hemoglobin 9.6 creatinine 7.5.  Patient underwent hemodialysis today and was noted to have elevated blood pressure with systolic blood pressure of 200 received 2 doses of IV hydralazine.  And was recommended to monitor him overnight per nocturnist.    Review of Systems   Constitutional:  Positive for fatigue.   HENT:          Hiccups   Respiratory:  Negative for cough and shortness of breath.    Cardiovascular:  Negative for chest pain and leg swelling.     Objective:     Vital Signs (Most Recent):  Temp: 98.2 °F (36.8 °C) (12/06/23 0328)  Pulse: 99 (12/06/23 0328)  Resp: 19 (12/06/23 0328)  BP: (!) 189/88 (12/06/23 0624)  SpO2: 95 % (12/06/23 0328) Vital Signs (24h Range):  Temp:  [97.9 °F (36.6 °C)-99.7 °F (37.6 °C)] 98.2 °F (36.8 °C)  Pulse:  [] 99  Resp:  [14-20] 19  SpO2:  [93 %-100 %] 95 %  BP: (174-223)/() 189/88     Weight: 102.1 kg (225 lb)  Body mass index is 29.69 kg/m².    Intake/Output Summary (Last 24 hours) at 12/6/2023 0714  Last data filed at 12/5/2023 1715  Gross per 24 hour   Intake 960 ml   Output 4500 ml   Net -3540 ml         Physical Exam  Constitutional:       Appearance: Normal appearance.   HENT:      Head: Normocephalic.   Cardiovascular:      Rate and Rhythm: Normal rate.   Pulmonary:      Effort: Pulmonary effort is normal.      Breath sounds: Normal breath sounds.   Neurological:      Mental Status: He is alert.             Significant Labs: All pertinent labs within the past 24 hours have been reviewed.  BMP:   Recent Labs   Lab 12/06/23  0443   GLU 82   *   K 4.4   CL 99   CO2 20*   BUN 26*   CREATININE 5.5*   CALCIUM 8.5*   MG 1.9     CBC:   Recent Labs   Lab 12/04/23  1302 12/05/23  0500 12/06/23  0443   WBC 4.89 3.63* 3.96   HGB 10.4* 9.5* 9.6*   HCT 31.4* 28.9* 30.4*    218  237       Significant Imaging: I have reviewed all pertinent imaging results/findings within the past 24 hours.

## 2023-12-06 NOTE — ASSESSMENT & PLAN NOTE
Etiology unclear.  Has tried and failed an extensive list of medications.  Will place him back on PPI as per most recent GI note  Consult GI-plan EGD today-- no significant findings  Continue nightly elavil    Will refill home elavil and add thorazine.

## 2023-12-06 NOTE — PROGRESS NOTES
Please notify patient that biopsies reviewed and showed no bacteria.  Continue current meds and follow up as previously planned.   No

## 2023-12-06 NOTE — ASSESSMENT & PLAN NOTE
Patient has hyponatremia which is uncontrolled,We will aim to correct the sodium by 4-6mEq in 24 hours. We will monitor sodium Daily. The hyponatremia is due to Dehydration/hypovolemia. We will treat the hyponatremia with IV fluids as follows: IVF bolus NS-caution due to ESRD. The patient's sodium results have been reviewed and are listed below.  Recent Labs   Lab 12/06/23  0443   *       Nephrology consulted

## 2023-12-07 VITALS
RESPIRATION RATE: 16 BRPM | OXYGEN SATURATION: 99 % | TEMPERATURE: 98 F | HEART RATE: 99 BPM | DIASTOLIC BLOOD PRESSURE: 89 MMHG | WEIGHT: 225 LBS | HEIGHT: 73 IN | SYSTOLIC BLOOD PRESSURE: 185 MMHG | BODY MASS INDEX: 29.82 KG/M2

## 2023-12-07 LAB
ALBUMIN SERPL BCP-MCNC: 2.3 G/DL (ref 3.5–5.2)
ALP SERPL-CCNC: 751 U/L (ref 55–135)
ALT SERPL W/O P-5'-P-CCNC: 14 U/L (ref 10–44)
ANION GAP SERPL CALC-SCNC: 11 MMOL/L (ref 8–16)
AST SERPL-CCNC: 38 U/L (ref 10–40)
BILIRUB SERPL-MCNC: 1.4 MG/DL (ref 0.1–1)
BUN SERPL-MCNC: 16 MG/DL (ref 6–20)
CALCIUM SERPL-MCNC: 8.7 MG/DL (ref 8.7–10.5)
CHLORIDE SERPL-SCNC: 99 MMOL/L (ref 95–110)
CO2 SERPL-SCNC: 23 MMOL/L (ref 23–29)
CREAT SERPL-MCNC: 4.7 MG/DL (ref 0.5–1.4)
EST. GFR  (NO RACE VARIABLE): 14 ML/MIN/1.73 M^2
GLUCOSE SERPL-MCNC: 73 MG/DL (ref 70–110)
MAGNESIUM SERPL-MCNC: 1.8 MG/DL (ref 1.6–2.6)
PHOSPHATE SERPL-MCNC: 2.9 MG/DL (ref 2.7–4.5)
POTASSIUM SERPL-SCNC: 4.1 MMOL/L (ref 3.5–5.1)
PROT SERPL-MCNC: 6.6 G/DL (ref 6–8.4)
SODIUM SERPL-SCNC: 133 MMOL/L (ref 136–145)

## 2023-12-07 PROCEDURE — 63600175 PHARM REV CODE 636 W HCPCS: Mod: JW | Performed by: INTERNAL MEDICINE

## 2023-12-07 PROCEDURE — 96372 THER/PROPH/DIAG INJ SC/IM: CPT | Mod: 59 | Performed by: NURSE PRACTITIONER

## 2023-12-07 PROCEDURE — 25000003 PHARM REV CODE 250: Performed by: NURSE PRACTITIONER

## 2023-12-07 PROCEDURE — 84100 ASSAY OF PHOSPHORUS: CPT | Performed by: NURSE PRACTITIONER

## 2023-12-07 PROCEDURE — 83735 ASSAY OF MAGNESIUM: CPT | Performed by: NURSE PRACTITIONER

## 2023-12-07 PROCEDURE — C9113 INJ PANTOPRAZOLE SODIUM, VIA: HCPCS | Performed by: NURSE PRACTITIONER

## 2023-12-07 PROCEDURE — 82900000 HC HEMODIALYSIS 1:1

## 2023-12-07 PROCEDURE — 94761 N-INVAS EAR/PLS OXIMETRY MLT: CPT

## 2023-12-07 PROCEDURE — 96375 TX/PRO/DX INJ NEW DRUG ADDON: CPT | Mod: 59

## 2023-12-07 PROCEDURE — 80053 COMPREHEN METABOLIC PANEL: CPT | Performed by: NURSE PRACTITIONER

## 2023-12-07 PROCEDURE — 99900035 HC TECH TIME PER 15 MIN (STAT)

## 2023-12-07 PROCEDURE — 63600175 PHARM REV CODE 636 W HCPCS: Performed by: NURSE PRACTITIONER

## 2023-12-07 PROCEDURE — 96376 TX/PRO/DX INJ SAME DRUG ADON: CPT

## 2023-12-07 PROCEDURE — G0378 HOSPITAL OBSERVATION PER HR: HCPCS

## 2023-12-07 PROCEDURE — G0257 UNSCHED DIALYSIS ESRD PT HOS: HCPCS

## 2023-12-07 PROCEDURE — 80100014 HC HEMODIALYSIS 1:1

## 2023-12-07 PROCEDURE — 36415 COLL VENOUS BLD VENIPUNCTURE: CPT | Performed by: NURSE PRACTITIONER

## 2023-12-07 PROCEDURE — 63600175 PHARM REV CODE 636 W HCPCS: Performed by: INTERNAL MEDICINE

## 2023-12-07 RX ORDER — BACLOFEN 5 MG/1
5 TABLET ORAL 3 TIMES DAILY
Status: DISCONTINUED | OUTPATIENT
Start: 2023-12-07 | End: 2023-12-07 | Stop reason: HOSPADM

## 2023-12-07 RX ORDER — BACLOFEN 5 MG/1
5 TABLET ORAL 3 TIMES DAILY
Qty: 90 TABLET | Refills: 0 | Status: SHIPPED | OUTPATIENT
Start: 2023-12-07 | End: 2023-12-07 | Stop reason: SDUPTHER

## 2023-12-07 RX ORDER — BACLOFEN 5 MG/1
5 TABLET ORAL 3 TIMES DAILY
Qty: 90 TABLET | Refills: 0 | Status: ON HOLD | OUTPATIENT
Start: 2023-12-07 | End: 2023-12-12 | Stop reason: HOSPADM

## 2023-12-07 RX ORDER — CHLORPROMAZINE HYDROCHLORIDE 25 MG/1
25 TABLET, FILM COATED ORAL 3 TIMES DAILY PRN
Qty: 90 TABLET | Refills: 0 | Status: ON HOLD | OUTPATIENT
Start: 2023-12-07 | End: 2023-12-12 | Stop reason: HOSPADM

## 2023-12-07 RX ORDER — LABETALOL HYDROCHLORIDE 5 MG/ML
10 INJECTION, SOLUTION INTRAVENOUS ONCE
Status: COMPLETED | OUTPATIENT
Start: 2023-12-07 | End: 2023-12-07

## 2023-12-07 RX ADMIN — SEVELAMER CARBONATE 800 MG: 800 TABLET, FILM COATED ORAL at 06:12

## 2023-12-07 RX ADMIN — HEPARIN SODIUM 4000 UNITS: 1000 INJECTION, SOLUTION INTRAVENOUS; SUBCUTANEOUS at 12:12

## 2023-12-07 RX ADMIN — LABETALOL HYDROCHLORIDE 10 MG: 5 INJECTION INTRAVENOUS at 03:12

## 2023-12-07 RX ADMIN — HEPARIN SODIUM 5000 UNITS: 5000 INJECTION INTRAVENOUS; SUBCUTANEOUS at 06:12

## 2023-12-07 RX ADMIN — MUPIROCIN: 20 OINTMENT TOPICAL at 09:12

## 2023-12-07 RX ADMIN — HYDRALAZINE HYDROCHLORIDE 10 MG: 20 INJECTION INTRAMUSCULAR; INTRAVENOUS at 12:12

## 2023-12-07 RX ADMIN — EPOETIN ALFA-EPBX 5100 UNITS: 10000 INJECTION, SOLUTION INTRAVENOUS; SUBCUTANEOUS at 12:12

## 2023-12-07 RX ADMIN — BACLOFEN 5 MG: 5 TABLET ORAL at 03:12

## 2023-12-07 RX ADMIN — PANTOPRAZOLE SODIUM 40 MG: 40 INJECTION, POWDER, LYOPHILIZED, FOR SOLUTION INTRAVENOUS at 08:12

## 2023-12-07 RX ADMIN — HYDRALAZINE HYDROCHLORIDE 100 MG: 25 TABLET, FILM COATED ORAL at 03:12

## 2023-12-07 RX ADMIN — SEVELAMER CARBONATE 800 MG: 800 TABLET, FILM COATED ORAL at 12:12

## 2023-12-07 RX ADMIN — HYDRALAZINE HYDROCHLORIDE 100 MG: 25 TABLET, FILM COATED ORAL at 06:12

## 2023-12-07 RX ADMIN — NIFEDIPINE 90 MG: 30 TABLET, FILM COATED, EXTENDED RELEASE ORAL at 08:12

## 2023-12-07 RX ADMIN — BACLOFEN 5 MG: 5 TABLET ORAL at 12:12

## 2023-12-07 RX ADMIN — HEPARIN SODIUM 5000 UNITS: 5000 INJECTION INTRAVENOUS; SUBCUTANEOUS at 03:12

## 2023-12-07 RX ADMIN — PROCHLORPERAZINE EDISYLATE 10 MG: 5 INJECTION INTRAMUSCULAR; INTRAVENOUS at 08:12

## 2023-12-07 NOTE — PLAN OF CARE
Problem: Adult Inpatient Plan of Care  Goal: Plan of Care Review  Outcome: Ongoing, Progressing  Goal: Patient-Specific Goal (Individualized)  Outcome: Ongoing, Progressing  Goal: Optimal Comfort and Wellbeing  Outcome: Ongoing, Progressing     Problem: Diabetes Comorbidity  Goal: Blood Glucose Level Within Targeted Range  Outcome: Ongoing, Progressing     Problem: Infection  Goal: Absence of Infection Signs and Symptoms  Outcome: Ongoing, Progressing     Problem: Device-Related Complication Risk (Hemodialysis)  Goal: Safe, Effective Therapy Delivery  Outcome: Ongoing, Progressing     Problem: Hemodynamic Instability (Hemodialysis)  Goal: Effective Tissue Perfusion  Outcome: Ongoing, Progressing     Problem: Infection (Hemodialysis)  Goal: Absence of Infection Signs and Symptoms  Outcome: Ongoing, Progressing     Problem: Fall Injury Risk  Goal: Absence of Fall and Fall-Related Injury  Outcome: Ongoing, Progressing     Problem: Hypertension Comorbidity  Goal: Blood Pressure in Desired Range  Outcome: Ongoing, Progressing     Problem: Pain Acute  Goal: Acceptable Pain Control and Functional Ability  Outcome: Ongoing, Progressing    Patient voices no complaints this shift and does appear to rest throughout the majority of this shift. No hiccups are noted upon assessment or q2h rounding this shift. Patient denies and is free from reporting pain this shift and continues to utilize urinal for urine output. Patient continues to have elevated blood pressures and PRN, scheduled, and one-time dose medications utilized. Patient remains afebrile without s/s of infection to note. Glucose monitored without diabetic issues to note. Safety precautions in place with no fall to note. Reviewed POC with patient this shift.

## 2023-12-07 NOTE — ANESTHESIA PREPROCEDURE EVALUATION
12/06/2023  Catalino Mcfadden is a 58 y.o., male.      Pre-op Assessment    I have reviewed the NPO Status.      Review of Systems  Cardiovascular:     Hypertension                                  Hypertension         Renal/:  Chronic Renal Disease        Kidney Function/Disease             Endocrine:  Diabetes    Diabetes                          Physical Exam  General: Well nourished    Airway:  Mallampati: II   Mouth Opening: Normal  TM Distance: Normal  Tongue: Normal  Neck ROM: Normal ROM    Dental:  Intact    Chest/Lungs:  Clear to auscultation, Normal Respiratory Rate        Anesthesia Plan  Type of Anesthesia, risks & benefits discussed:    Anesthesia Type: Gen Natural Airway  Intra-op Monitoring Plan: Standard ASA Monitors  Induction:  IV  Informed Consent: Informed consent signed with the Patient and all parties understand the risks and agree with anesthesia plan.  All questions answered.   ASA Score: 3    Ready For Surgery From Anesthesia Perspective.     .

## 2023-12-07 NOTE — PLAN OF CARE
Discharge instructions given to patient. Patient verbalized complete understanding. IV discontinued with catheter intact, pressure applied to site and secured with tape. Patient tolerated well.  Problem: Adult Inpatient Plan of Care  Goal: Plan of Care Review  Outcome: Met  Goal: Patient-Specific Goal (Individualized)  Outcome: Met  Goal: Absence of Hospital-Acquired Illness or Injury  Outcome: Met  Goal: Optimal Comfort and Wellbeing  Outcome: Met  Goal: Readiness for Transition of Care  Outcome: Met     Problem: Diabetes Comorbidity  Goal: Blood Glucose Level Within Targeted Range  Outcome: Met     Problem: Infection  Goal: Absence of Infection Signs and Symptoms  Outcome: Met     Problem: Device-Related Complication Risk (Hemodialysis)  Goal: Safe, Effective Therapy Delivery  Outcome: Met     Problem: Hemodynamic Instability (Hemodialysis)  Goal: Effective Tissue Perfusion  Outcome: Met     Problem: Infection (Hemodialysis)  Goal: Absence of Infection Signs and Symptoms  Outcome: Met     Problem: Fall Injury Risk  Goal: Absence of Fall and Fall-Related Injury  Outcome: Met     Problem: Hypertension Comorbidity  Goal: Blood Pressure in Desired Range  Outcome: Met     Problem: Pain Acute  Goal: Acceptable Pain Control and Functional Ability  Outcome: Met     Problem: Adjustment to Illness (Chronic Kidney Disease)  Goal: Optimal Coping with Chronic Illness  Outcome: Met     Problem: Electrolyte Imbalance (Chronic Kidney Disease)  Goal: Electrolyte Balance  Outcome: Met     Problem: Fluid Volume Excess (Chronic Kidney Disease)  Goal: Fluid Balance  Outcome: Met     Problem: Functional Decline (Chronic Kidney Disease)  Goal: Optimal Functional Ability  Outcome: Met     Problem: Hematologic Alteration (Chronic Kidney Disease)  Goal: Absence of Anemia Signs and Symptoms  Outcome: Met     Problem: Oral Intake Inadequate (Chronic Kidney Disease)  Goal: Optimal Oral Intake  Outcome: Met     Problem: Pain (Chronic Kidney  Disease)  Goal: Acceptable Pain Control  Outcome: Met     Problem: Renal Function Impairment (Chronic Kidney Disease)  Goal: Minimize Renal Failure Effects  Outcome: Met

## 2023-12-07 NOTE — CONSULTS
Nephrology Consult Note        Patient Name: Catalino Mcfadden  MRN: [id number]    Patient Class: OP- Observation   Admission Date: 12/4/2023  Length of Stay: 0 days  Date of Service: 12/7/2023    Attending Physician: Roger Joshua MD  Primary Care Provider: Lizbeth, Primary Doctor    Reason for Consult: esrd    SUBJECTIVE:     HPI: 58-year-old man with a history of type 2 diabetes, gastroparesis end-stage renal disease on hemodialysis, Tuesday Thursday Saturday, hypertension, hyperlipidemia and chronic hiccups who presents emergency department complaining of intractable hiccups for 2 years.  States his symptoms are worsening and having difficulty eating, sleeping, breathing.  Hiccups are spontaneous and lasts for days on end.  He has tried and failed treatment with multiple medications including PPIs, Phenergan, Zofran, baclofen, gabapentin, Reglan, Thorazine, imipramine, bethanechol, Elavil.  He is being referred to GI specialist.     12/6 VSS. HD today. Ok to dc after HD if BP is controlled...  12/7 VSS. BP still high. HD today, then can dc if BP is OK. Continue home meds.    Past Medical History:   Diagnosis Date    Diabetes mellitus, type 2     Diabetic foot ulcer associated with diabetes mellitus due to underlying condition 07/16/2020    ESRD on hemodialysis     Hyperlipidemia     Hypertension     Obese      Past Surgical History:   Procedure Laterality Date    AV FISTULA PLACEMENT Left 07/06/2023    Procedure: CREATION, AV FISTULA;  Surgeon: Daniel Poon MD;  Location: [facility] OR;  Service: Vascular;  Laterality: Left;  RN PREOP 6/28/2023  LABS DAY OF SURGERY    BONE BIOPSY Left 07/17/2020    Procedure: BIOPSY, BONE;  Surgeon: Verona Whitmore DPM;  Location: [facility] OR;  Service: Podiatry;  Laterality: Left;    CERVICAL DISC SURGERY  07/11/2016    DEBRIDEMENT Left 07/17/2020    Procedure: DEBRIDEMENT;  Surgeon: Verona Whitmore DPM;  Location: [facility] OR;  Service: Podiatry;  Laterality: Left;    DEBRIDEMENT OF FOOT Right      toes & plantar surface    ESOPHAGEAL MANOMETRY WITH MEASUREMENT OF IMPEDANCE N/A 03/27/2023    Procedure: MANOMETRY, ESOPHAGUS, WITH IMPEDANCE MEASUREMENT;  Surgeon: Roopa Pérez MD;  [facility]  Service: Endoscopy;  Laterality: N/A;  Belching and rumination protocol please  instructions via portal - sm    ESOPHAGOGASTRODUODENOSCOPY N/A 12/16/2022    Procedure: EGD (ESOPHAGOGASTRODUODENOSCOPY);  Surgeon: Eren Francois MD;  Location: [facility] ENDO;  Service: Endoscopy;  Laterality: N/A;  Pt. on Dialysis. K+ ordered prior to procedure.EC    ESOPHAGOGASTRODUODENOSCOPY N/A 12/5/2023    Procedure: EGD (ESOPHAGOGASTRODUODENOSCOPY);  Surgeon: Kash Johnston MD;  Location: [facility] [place] [place];  Service: Endoscopy;  Laterality: N/A;    FRACTURE SURGERY Right     medial ankle, metal plate present    INSERTION OF TUNNELED CENTRAL VENOUS CATHETER (CVC) WITH SUBCUTANEOUS PORT N/A 06/11/2021    Procedure: TUNNEL CATH INSERTION WITHOUT PORT;  Surgeon: Dosnadeem Diagnostic Provider;  Location: [facility] OR;  Service: Radiology;  Laterality: N/A;  11AM START---PHONE PREOP 6/10/21---COVID POSTIVE ON 7/2020---NO S/S    left leg orthopedic surgery Left     UPPER GASTROINTESTINAL ENDOSCOPY       Family History   Adopted: Yes   Problem Relation Age of Onset    Heart disease Father     Colon cancer Neg Hx     Esophageal cancer Neg Hx     Colon polyps Neg Hx     Stomach cancer Neg Hx      Social History     Tobacco Use    Smoking status: Never    Smokeless tobacco: Never   Substance Use Topics    Alcohol use: Yes     Comment: occ rare beer    Drug use: No       Review of patient's allergies indicates:  No Known Allergies    Outpatient meds:  No current facility-administered medications on file prior to encounter.     Current Outpatient Medications on File Prior to Encounter   Medication Sig Dispense Refill    hydrALAZINE (APRESOLINE) 100 MG tablet Take 1 tablet (100 mg total) by mouth 3 (three) times daily. 90 tablet 0     NIFEdipine (PROCARDIA XL) 90 MG (OSM) 24 hr tablet Take 1 tablet (90 mg total) by mouth once daily. 90 tablet 3    ondansetron (ZOFRAN-ODT) 4 MG TbDL Take 1 tablet (4 mg total) by mouth every 6 (six) hours as needed (nausea and vomiting). 30 tablet 3    promethazine (PHENERGAN) 12.5 MG Tab Take 1 tablet (12.5 mg total) by mouth every 6 (six) hours as needed (nausea and vomiting). 30 tablet 3    aspirin 81 MG Chew Take 1 tablet (81 mg total) by mouth once daily. (Patient not taking: Reported on 12/4/2023) 30 tablet 0    atorvastatin (LIPITOR) 40 MG tablet Take 1 tablet (40 mg total) by mouth once daily. (Patient not taking: Reported on 12/4/2023) 30 tablet 0    bethanechol (URECHOLINE) 10 MG Tab Take 1 tablet by mouth 3 (three) times daily.      gabapentin (NEURONTIN) 100 MG capsule Take 1 capsule (100 mg total) by mouth 3 (three) times daily. (Patient not taking: Reported on 12/4/2023) 90 capsule 11    heparin sod,pork in 0.45% NaCl (HEPARIN, PORCINE, 100 UNITS) 100 unit/100 mL (1 unit/mL) SolP in sodium chloride 0.45 % 100 mL infusion Inject 1 mL/hr into the vein continuous.      methoxy peg-epoetin beta (MIRCERA INJ) Inject 50 mcg into the skin every 28 days.      pantoprazole (PROTONIX) 40 MG tablet Take 1 tablet by mouth once daily.      sevelamer carbonate (RENVELA) 800 mg Tab Take 1 tablet (800 mg total) by mouth 3 (three) times daily with meals. (Patient not taking: Reported on 12/4/2023) 90 tablet 11    sucroferric oxyhydroxide (VELPHORO) 500 mg Chew Take 1 tablet by mouth once daily.         Scheduled meds:   sodium chloride 0.9%   Intravenous Once    amitriptyline  10 mg Oral QHS    epoetin jose-epbx  50 Units/kg Intravenous Every Tues, Thurs, Sat    heparin (porcine)  5,000 Units Subcutaneous Q8H    hydrALAZINE  100 mg Oral Q8H    hydrALAZINE  75 mg Oral Once    mupirocin   Nasal BID    NIFEdipine  90 mg Oral Daily    pantoprazole  40 mg Intravenous BID    sevelamer carbonate  800 mg Oral TID WM        Infusions:        PRN meds:  acetaminophen, albuterol-ipratropium, aluminum-magnesium hydroxide-simethicone, dextrose 10%, dextrose 10%, diphenoxylate-atropine 2.5-0.025 mg, glucagon (human recombinant), glucose, glucose, heparin (porcine), hydrALAZINE, hyoscyamine, insulin aspart U-100, LORazepam, magnesium oxide, magnesium oxide, melatonin, naloxone, potassium bicarbonate, potassium bicarbonate, potassium bicarbonate, potassium, sodium phosphates, potassium, sodium phosphates, potassium, sodium phosphates, prochlorperazine, promethazine (PHENERGAN) 12.5 mg in dextrose 5 % (D5W) 50 mL IVPB, simethicone, sodium chloride 0.9%, sodium chloride 0.9%    Review of Systems:  Constitutional:  Negative for chills, fever, malaise/fatigue and weight loss.   HENT:  Negative for hearing loss and nosebleeds.    Eyes:  Negative for blurred vision, double vision and photophobia.   Respiratory:  Negative for cough, shortness of breath and wheezing.    Cardiovascular:  Negative for chest pain, palpitations and leg swelling.   Gastrointestinal:  Negative for abdominal pain, constipation, diarrhea, heartburn, nausea and vomiting.   Genitourinary:  Negative for dysuria, frequency and urgency.   Musculoskeletal:  Negative for falls, joint pain and myalgias.   Skin:  Negative for itching and rash.   Neurological:  Negative for dizziness, speech change, focal weakness, loss of consciousness and headaches.   Endo/Heme/Allergies:  Does not bruise/bleed easily.   Psychiatric/Behavioral:  Negative for depression and substance abuse. The patient is not nervous/anxious.      OBJECTIVE:     Vital Signs and IO:  Temp:  [98 °F (36.7 °C)-99.2 °F (37.3 °C)]   Pulse:  [81-99]   Resp:  [16-20]   BP: (156-217)/()   SpO2:  [95 %-98 %]   I/O last 3 completed shifts:  In: 1100 [P.O.:600; Other:500]  Out: 4025 [Urine:525; Other:3500]  Wt Readings from Last 5 Encounters:   12/05/23 102.1 kg (225 lb)   12/01/23 96.1 kg (211 lb 13.8 oz)   11/15/23  99.8 kg (220 lb 0.3 oz)   09/05/23 99.8 kg (220 lb)   08/28/23 102 kg (224 lb 15.7 oz)     Body mass index is 29.69 kg/m².    Physical Exam  Constitutional:       General: Patient is not in acute distress.     Appearance: Patient is well-developed. She is not diaphoretic.   HENT:      Head: Normocephalic and atraumatic.      Mouth/Throat: Mucous membranes are moist.   Eyes:      General: No scleral icterus.     Pupils: Pupils are equal, round, and reactive to light.   Cardiovascular:      Rate and Rhythm: Normal rate and regular rhythm.   Pulmonary:      Effort: Pulmonary effort is normal. No respiratory distress.      Breath sounds: No stridor.   Abdominal:      General: There is no distension.      Palpations: Abdomen is soft.   Musculoskeletal:         General: No deformity. Normal range of motion.      Cervical back: Neck supple.   Skin:     General: Skin is warm and dry.      Findings: No rash present. No erythema.   Neurological:      Mental Status: Patient is alert and oriented to person, place, and time.      Cranial Nerves: No cranial nerve deficit.   Psychiatric:         Behavior: Behavior normal.     Laboratory:  Recent Labs   Lab 12/05/23  0500 12/06/23  0443 12/07/23  0452   * 131* 133*   K 5.0 4.4 4.1   CL 98 99 99   CO2 19* 20* 23   BUN 43* 26* 16   CREATININE 7.5* 5.5* 4.7*   GLU 74 82 73         Recent Labs   Lab 12/05/23  0500 12/06/23  0443 12/07/23  0452   CALCIUM 8.5* 8.5* 8.7   ALBUMIN 2.2* 2.3* 2.3*   PHOS 5.7* 3.5 2.9   MG 2.0 1.9 1.8         Recent Labs   Lab 02/23/22  0758   PTH, Intact 218.0 H         Recent Labs   Lab 12/04/23  2123 12/05/23  0735 12/05/23  1654 12/05/23  2153 12/06/23  1118 12/06/23  1706 12/06/23  2112   POCTGLUCOSE 70 85 67* 76 86 88 78         Recent Labs   Lab 02/09/22  0722 02/23/22  0758 02/22/23  0653   Hemoglobin A1C 5.0 5.0 5.1         Recent Labs   Lab 12/04/23  1302 12/05/23  0500 12/06/23  0443   WBC 4.89 3.63* 3.96   HGB 10.4* 9.5* 9.6*   HCT 31.4*  28.9* 30.4*    218 237   MCV 89 89 91   MCHC 33.1 32.9 31.6*   MONO 16.2*  0.8 15.2*  0.6 14.4  0.6   EOSINOPHIL 2.2 3.3 2.0         Recent Labs   Lab 12/05/23  0500 12/06/23  0443 12/07/23  0452   BILITOT 1.6* 1.4* 1.4*   PROT 6.6 6.5 6.6   ALBUMIN 2.2* 2.3* 2.3*   ALKPHOS 719* 814* 751*   ALT 29 21 14   AST 45* 55* 38         Recent Labs   Lab 07/27/21  1624   Color, UA Yellow   Appearance, UA Cloudy A   pH, UA 5.0   Specific Gravity, UA 1.010   Protein, UA 3+ A   Glucose, UA 1+ A   Ketones, UA Negative   Bilirubin (UA) Negative   Occult Blood UA Negative   Nitrite, UA Negative   RBC, UA 1   WBC, UA 3   Bacteria Occasional   Hyaline Casts, UA 0         Recent Labs   Lab 09/24/22  1355   Sample VENOUS         Microbiology Results (last 7 days)       ** No results found for the last 168 hours. **            ASSESSMENT/PLAN:     ESRD on HD TTS via AVF  Next HD per schedule.  Continue current dialysis prescription.  Renal diet - low K, low phos.  No IVs or BP checks on access and/or non-dominant arm.    Anemia of CKD  Hgb and HCT are acceptable. Monitor for now.  Will provide CLEMENT and/or IV iron PRN.    MBD / Secondary HPT  Ca, Phos, PTH and vitamin D levels are acceptable.   Phos binders, vitamin D and analogues, calcimimetics will be given as needed.    HTN  BP seem uncontrolled. More HD today.  Tolerate asymptomatic HTN up to -160.  Continue home meds.  Low sodium diet.    Thank you for allowing us to participate in the care of your patient!   We will follow the patient and provide recommendations as needed.    Patient care time was spent personally by me on the following activities:     Obtaining a history.  Examination of patient.  Providing medical care at the patients bedside.  Developing a treatment plan with patient or surrogate and bedside caregivers.  Ordering and reviewing laboratory studies, radiographic studies, pulse oximetry.  Ordering and performing treatments and  interventions.  Evaluation of patient's response to treatment.  Discussions with consultants while on the unit and immediately available to the patient.  Re-evaluation of the patient's condition.  Documentation in the medical record.     Juan Waters MD    [facility]  [address] [place] [id number]    [phone] - tel  [phone] - fax    12/7/2023

## 2023-12-07 NOTE — NURSING
Contacted CATHY Watts NP with [facility] at 0311 regarding ongoing hypertension. Notified NP of reason for admission with qualifying hx of ESRD and patient doing HD given. Notified of Hydralazine regimen with ordered doses and last administered doses and last two recent blood pressures. Also notified NP of recent dialysis and scheduled with reported amounts of fluid removed. Order received as entered per NP.

## 2023-12-07 NOTE — PROGRESS NOTES
4000 ml net uf removed   12/07/23 1340   Required for all Hemodialysis Patients   Hepatitis Status negative   Handoff Report   Received From Arely   Given To Yohana   Treatment Type   Treatment Type Maintenance   Vital Signs   Temp 97.7 °F (36.5 °C)   Pulse 80   Resp 18   BP (!) 153/70   Assessments (Pre/Post)   Date Hepatitis Profile Obtained 12/05/23   Blood Liters Processed (BLP) 64   Transport Modality not applicable   Level of Consciousness (AVPU) alert   Dialyzer Clearance mildly streaked   Pain   Preferred Pain Scale number (Numeric Rating Pain Scale)   Pain Rating (0-10): Rest 0        Hemodialysis Catheter right subclavian   No placement date or time found.   Present Prior to Hospital Arrival?: Yes  Hemodialysis Catheter Type: Tunneled catheter  Location: right subclavian  Placement Verification: Blood return   Line Necessity Review CRRT/HD   Line Securement Device Secured with sutures   Dressing Type CHG impregnated dressing/sponge   Dressing Status Clean;Dry;Intact   Dressing Intervention Integrity maintained   Date on Dressing 12/06/23   Dressing Due to be Changed 12/12/23   Venous Patency/Care flushed w/o difficulty;heparin locked;deaccessed   Arterial Patency/Care flushed w/o difficulty;heparin locked;deaccessed   Post-Hemodialysis Assessment   Rinseback Volume (mL) 250 mL   Blood Volume Processed (Liters) 64 L   Dialyzer Clearance Lightly streaked   Duration of Treatment 180 minutes   Additional Fluid Intake (mL) 500 mL   Total UF (mL) 4500 mL   Net Fluid Removal 4000   Patient Response to Treatment tolerated well   Post-Treatment Weight 91.6 kg (201 lb 15.1 oz)   Treatment Weight Change -4   Post-Hemodialysis Comments tx completed   Edema   Edema generalized

## 2023-12-07 NOTE — PLAN OF CARE
12/06/23 1912   Patient Assessment/Suction   Level of Consciousness (AVPU) alert   Respiratory Effort Normal;Unlabored   Expansion/Accessory Muscles/Retractions no retractions;no use of accessory muscles   All Lung Fields Breath Sounds clear;Anterior:;Lateral:   Rhythm/Pattern, Respiratory depth regular;pattern regular;unlabored   Cough Frequency no cough   PRE-TX-O2   Device (Oxygen Therapy) room air   SpO2 95 %   Pulse Oximetry Type Intermittent   $ Pulse Oximetry - Multiple Charge Pulse Oximetry - Multiple   Pulse 92   Resp 16   Aerosol Therapy   $ Aerosol Therapy Charges PRN treatment not required   Respiratory Treatment Status (SVN) PRN treatment not required

## 2023-12-07 NOTE — CARE UPDATE
12/07/23 0738   Patient Assessment/Suction   Level of Consciousness (AVPU) alert   Respiratory Effort Normal;Unlabored   Expansion/Accessory Muscles/Retractions no use of accessory muscles;expansion symmetric;no retractions   All Lung Fields Breath Sounds clear;equal bilaterally   Rhythm/Pattern, Respiratory unlabored;pattern regular;depth regular   Cough Frequency no cough   PRE-TX-O2   Device (Oxygen Therapy) room air   SpO2 98 %   Pulse Oximetry Type Intermittent   $ Pulse Oximetry - Multiple Charge Pulse Oximetry - Multiple   Pulse 86   Resp 18   Aerosol Therapy   $ Aerosol Therapy Charges PRN treatment not required   Respiratory Treatment Status (SVN) PRN treatment not required         Aerosol treatments PRN.

## 2023-12-07 NOTE — PLAN OF CARE
Patient cleared for discharge from case management standpoint.     Pulmonology office to contact patient to schedule hospital follow ups.  Patient states he goes to [facility] for healthcare needs and will schedule PCP          12/07/23 1630   Final Note   Assessment Type Final Discharge Note   Anticipated Discharge Disposition Home   What phone number can be called within the next 1-3 days to see how you are doing after discharge? [phone]   Hospital Resources/Appts/Education Provided Provided patient/caregiver with written discharge plan information;Provided education on problems/symptoms using teachback;Appointment suggestion unavailable   Post-Acute Status   Post-Acute Authorization Other   Other Status No Post-Acute Service Needs   Discharge Delays None known at this time

## 2023-12-07 NOTE — DISCHARGE INSTRUCTIONS
Take medications as prescribed. Return to ED for any worsening symptoms or concerns. Follow up as directed.     Discharge Instructions, [facility]    Thank you for choosing [facility] for your medical care.     You were admitted to the hospital with Intractable hiccups.     Please note your discharge instructions, including diet/activity restrictions, follow-up appointments, and medication changes.  If you have any questions about your medical issues, prescriptions, or any other questions, please feel free to contact the [facility] Medicine Dept at [phone] and we will help.    If you are previously with Home health, outpatient PT/OT or under a therapy program, you are cleared to return to those programs.    Please direct all long term medication refills and follow up to your primary care provider. Thank you again for letting us take care of your health care needs.    Please note the following discharge instructions per your discharging physician-  Yohana Pardo NP

## 2023-12-07 NOTE — ANESTHESIA POSTPROCEDURE EVALUATION
Anesthesia Post Evaluation    Patient: Catalino Mcfadden    Procedure(s) Performed: Procedure(s) (LRB):  EGD (ESOPHAGOGASTRODUODENOSCOPY) (N/A)    Final Anesthesia Type: general      Patient location during evaluation: PACU  Patient participation: Yes- Able to Participate  Level of consciousness: awake and alert and oriented  Post-procedure vital signs: reviewed and stable  Pain management: adequate  Airway patency: patent    PONV status at discharge: No PONV  Anesthetic complications: no      Cardiovascular status: blood pressure returned to baseline  Respiratory status: unassisted, spontaneous ventilation and room air  Hydration status: euvolemic  Follow-up not needed.              Vitals Value Taken Time   /95 12/06/23 1626   Temp 36.8 °C (98.3 °F) 12/06/23 1626   Pulse 91 12/06/23 1626   Resp 20 12/06/23 1626   SpO2 95 % 12/06/23 1626         Event Time   Out of Recovery 12/05/2023 12:02:06         Pain/Madiha Score: Madiha Score: 10 (12/5/2023  8:00 PM)

## 2023-12-07 NOTE — PLAN OF CARE
Problem: Adult Inpatient Plan of Care  Goal: Plan of Care Review  Outcome: Ongoing, Progressing     Problem: Diabetes Comorbidity  Goal: Blood Glucose Level Within Targeted Range  Outcome: Ongoing, Progressing     Problem: Infection  Goal: Absence of Infection Signs and Symptoms  Outcome: Ongoing, Progressing     Problem: Device-Related Complication Risk (Hemodialysis)  Goal: Safe, Effective Therapy Delivery  Outcome: Ongoing, Progressing     Problem: Hemodynamic Instability (Hemodialysis)  Goal: Effective Tissue Perfusion  Outcome: Ongoing, Progressing     Problem: Infection (Hemodialysis)  Goal: Absence of Infection Signs and Symptoms  Outcome: Ongoing, Progressing

## 2023-12-08 ENCOUNTER — TELEPHONE (OUTPATIENT)
Dept: ENDOSCOPY | Facility: HOSPITAL | Age: 58
End: 2023-12-08
Payer: COMMERCIAL

## 2023-12-08 DIAGNOSIS — Z12.11 SCREEN FOR COLON CANCER: Primary | ICD-10-CM

## 2023-12-08 DIAGNOSIS — K21.9 GASTROESOPHAGEAL REFLUX DISEASE, UNSPECIFIED WHETHER ESOPHAGITIS PRESENT: Primary | ICD-10-CM

## 2023-12-08 DIAGNOSIS — K22.4 INEFFECTIVE ESOPHAGEAL MOTILITY: ICD-10-CM

## 2023-12-08 RX ORDER — PANTOPRAZOLE SODIUM 40 MG/1
40 TABLET, DELAYED RELEASE ORAL DAILY
Qty: 90 TABLET | Refills: 3 | Status: ON HOLD | OUTPATIENT
Start: 2023-12-08 | End: 2023-12-12 | Stop reason: HOSPADM

## 2023-12-08 NOTE — DISCHARGE SUMMARY
[facility] Medicine  Discharge Summary      Patient Name: Catalino Mcfadden  MRN: [id number]  CASANDRA: [id number]  Patient Class: OP- Observation  Admission Date: 12/4/2023  Hospital Length of Stay: 0 days  Discharge Date and Time: 12/7/2023  5:20 PM  Attending Physician: Lizbeth att. providers found   Discharging Provider: Yohana Pardo NP  Primary Care Provider: Lizbeth, Primary Doctor    Primary Care Team: Networked reference to record PCT     HPI:   Catalino Mcfadden is a 57 y/o M with past medical history significant for HTN, ESRD on HD, DM2, gastroparesis and HLD who presented to the ED for further evaluation of chronic hiccups which has been ongoing x 2 years but has acutely worsened over the past couple of days.  He reports difficulty eating, sleeping, speaking and breathing.  Thus far, there have been no relieving factors.  He has tried and failed multiple treatments to include PPIs, Phenergan, Zofran, baclofen, gabapentin, Reglan, Thorazine, imipramine, bethanechol, and Elavil.  He has been seen by GI specialty and is currently undergoing workup.  Also had ambulatory referral to pulmonology due to abnormal CT scan which identified upper lobe predominant Alicia lymphatic and peribronchovascular micro nodular lung opacities and abnormal appearance of the liver.  Possible etiologies for these findings include metastatic disease although an infectious/inflammatory etiology such as granulomatous disease/sarcoidosis could have this appearance.  He was seen and determined to be a candidate for EBUS, but apparently he was lost to follow up.  It appears that he has lost about 35 pounds since March of this year, upon review of the record.   He was given zyprexa while in the emergency department with no relief of symptoms.  Majority of history is obtained through review of the medical record.  He has constant hiccups; therefore, verbal communication is limited.  He is placed in observation under the service  of hospital medicine for continued monitoring and treatment.    Procedure(s) (LRB):  EGD (ESOPHAGOGASTRODUODENOSCOPY) (N/A)      Hospital Course:   Patient was monitored closely during his hospitalization.  Pulmonary consulted and recommends outpatient follow-up with pulmonology for EUS to evaluate pulmonary lesion and questionable sarcoidosis.  GI consulted and patient underwent upper GI without any significant findings.  Patient was medically stable for discharge from a GI standpoint.  He reports his hiccups improved but do persist and he was started on baclofen TID.  Patient underwent hemodialysis and was noted to have hypertension post dialysis and required IV hydralazine prn. Nephrology rec HD x 3 consecutive days.  Patient underwent dialysis on 12/5, 12/6 and 12/7.  His blood pressure significantly improved after his last hemodialysis treatment and he remained stable.  Patient was cleared for discharge.  Patient verbalized understanding discharge instructions and return precautions.     Goals of Care Treatment Preferences:  Code Status: Full Code      Consults:   Consults (From admission, onward)          Status Ordering Provider     Case Management/  Once        Provider:  (Not yet assigned)    Completed DOUGLAS SANTOS     Inpatient consult to Nephrology  Once        Provider:  Alvaro Hobbs MD    Completed DOUGLAS SANTOS     Inpatient consult to Nephrology  Once        Provider:  Alvaro Hobbs MD    Completed DOUGLAS SANTOS     Inpatient consult to Pulmonology  Once        Provider:  Yohana Castro MD    Completed DOUGLAS SANTOS     Inpatient consult to Gastroenterology  Once        Provider:  Kash Johnston MD    Completed DOUGLAS SANTOS            No new Assessment & Plan notes have been filed under this hospital service since the last note was generated.  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    Specialty: Pulmonary Disease  Why: office to contact patient to schedule hospital follow up  Contact information:  [address] [place] [address]  [phone]               Nephrologist Follow up in 3 day(s).    Why: CALL YOUR FRIDAY for blood pressure recheck, hospital follow up                         Patient Instructions:      Diet renal     Diet Cardiac     Notify your health care provider if you experience any of the following:  persistent nausea and vomiting or diarrhea     Notify your health care provider if you experience any of the following:  severe uncontrolled pain     Activity as tolerated     Activity as tolerated       Significant Diagnostic Studies: Labs: CMP   Recent Labs   Lab 12/07/23  0452   *   K 4.1   CL 99   CO2 23   GLU 73   BUN 16   CREATININE 4.7*   CALCIUM 8.7   PROT 6.6   ALBUMIN 2.3*   BILITOT 1.4*   ALKPHOS 751*   AST 38   ALT 14   ANIONGAP 11    and CBC No results for input(s): "WBC", "HGB", "HCT", "PLT" in the last 48 hours.    Pending Diagnostic Studies:       None           Medications:  Reconciled Home Medications:      Medication List        START taking these medications      baclofen 5 mg Tab tablet  Commonly known as: LIORESAL  Take 1 tablet (5 mg total) by mouth 3 (three) " times daily.     chlorproMAZINE 25 MG tablet  Commonly known as: THORAZINE  Take 1 tablet (25 mg total) by mouth 3 (three) times daily as needed (hiccups).            CHANGE how you take these medications      amitriptyline 10 MG tablet  Commonly known as: ELAVIL  Take 1 tablet (10 mg total) by mouth every evening.  What changed:   when to take this  reasons to take this            CONTINUE taking these medications      aspirin 81 MG Chew  Take 1 tablet (81 mg total) by mouth once daily.     atorvastatin 40 MG tablet  Commonly known as: LIPITOR  Take 1 tablet (40 mg total) by mouth once daily.     bethanechol 10 MG Tab  Commonly known as: URECHOLINE  Take 1 tablet by mouth 3 (three) times daily.     gabapentin 100 MG capsule  Commonly known as: NEURONTIN  Take 1 capsule (100 mg total) by mouth 3 (three) times daily.     heparin (porcine) 100 units 100 unit/100 mL (1 unit/mL) Solp in sodium chloride 0.45 % 100 mL infusion  Inject 1 mL/hr into the vein continuous.     hydrALAZINE 100 MG tablet  Commonly known as: APRESOLINE  Take 1 tablet (100 mg total) by mouth 3 (three) times daily.     MIRCERA INJ  Inject 50 mcg into the skin every 28 days.     NIFEdipine 90 MG (OSM) 24 hr tablet  Commonly known as: PROCARDIA XL  Take 1 tablet (90 mg total) by mouth once daily.     ondansetron 4 MG Tbdl  Commonly known as: ZOFRAN-ODT  Take 1 tablet (4 mg total) by mouth every 6 (six) hours as needed (nausea and vomiting).     promethazine 12.5 MG Tab  Commonly known as: PHENERGAN  Take 1 tablet (12.5 mg total) by mouth every 6 (six) hours as needed (nausea and vomiting).     sevelamer carbonate 800 mg Tab  Commonly known as: RENVELA  Take 1 tablet (800 mg total) by mouth 3 (three) times daily with meals.     VELPHORO 500 mg Chew  Generic drug: sucroferric oxyhydroxide  Take 1 tablet by mouth once daily.            STOP taking these medications      esomeprazole 40 MG capsule  Commonly known as: NEXIUM     torsemide 20 MG  Tab  Commonly known as: DEMADEX     TRULICITY 0.75 mg/0.5 mL pen injector  Generic drug: dulaglutide              Indwelling Lines/Drains at time of discharge:   Lines/Drains/Airways       Central Venous Catheter Line  Duration                  Hemodialysis Catheter right subclavian -- days                    Time spent on the discharge of patient: 40 minutes         Yohana Pardo NP  Department of Hospital Medicine  [facility]/Surg

## 2023-12-11 ENCOUNTER — HOSPITAL ENCOUNTER (INPATIENT)
Facility: HOSPITAL | Age: 58
LOS: 5 days | Discharge: NURSING, REHAB OR LONG-TERM CARE | DRG: 304 | End: 2023-12-18
Attending: STUDENT IN AN ORGANIZED HEALTH CARE EDUCATION/TRAINING PROGRAM | Admitting: HOSPITALIST
Payer: COMMERCIAL

## 2023-12-11 DIAGNOSIS — R53.1 GENERALIZED WEAKNESS: ICD-10-CM

## 2023-12-11 DIAGNOSIS — N18.6 ESRD ON HEMODIALYSIS: ICD-10-CM

## 2023-12-11 DIAGNOSIS — R93.7 ABNORMAL MRI, SPINE: ICD-10-CM

## 2023-12-11 DIAGNOSIS — N18.6 ANEMIA DUE TO CHRONIC KIDNEY DISEASE, ON CHRONIC DIALYSIS: ICD-10-CM

## 2023-12-11 DIAGNOSIS — Z99.2 ANEMIA DUE TO CHRONIC KIDNEY DISEASE, ON CHRONIC DIALYSIS: ICD-10-CM

## 2023-12-11 DIAGNOSIS — R93.7 ABNORMAL MRI, LUMBAR SPINE: ICD-10-CM

## 2023-12-11 DIAGNOSIS — Z99.2 ESRD ON HEMODIALYSIS: ICD-10-CM

## 2023-12-11 DIAGNOSIS — I10 SEVERE HYPERTENSION: Primary | ICD-10-CM

## 2023-12-11 DIAGNOSIS — R42 DIZZINESS: ICD-10-CM

## 2023-12-11 DIAGNOSIS — D63.1 ANEMIA DUE TO CHRONIC KIDNEY DISEASE, ON CHRONIC DIALYSIS: ICD-10-CM

## 2023-12-11 DIAGNOSIS — R53.81 DEBILITY: ICD-10-CM

## 2023-12-11 DIAGNOSIS — I16.1 HYPERTENSIVE EMERGENCY: ICD-10-CM

## 2023-12-11 LAB
ALBUMIN SERPL BCP-MCNC: 3 G/DL (ref 3.5–5.2)
ALP SERPL-CCNC: 854 U/L (ref 55–135)
ALT SERPL W/O P-5'-P-CCNC: 30 U/L (ref 10–44)
ANION GAP SERPL CALC-SCNC: 15 MMOL/L (ref 8–16)
AST SERPL-CCNC: 64 U/L (ref 10–40)
BASOPHILS # BLD AUTO: 0.03 K/UL (ref 0–0.2)
BASOPHILS NFR BLD: 0.4 % (ref 0–1.9)
BILIRUB SERPL-MCNC: 1.5 MG/DL (ref 0.1–1)
BUN SERPL-MCNC: 53 MG/DL (ref 6–20)
CALCIUM SERPL-MCNC: 10.2 MG/DL (ref 8.7–10.5)
CHLORIDE SERPL-SCNC: 101 MMOL/L (ref 95–110)
CO2 SERPL-SCNC: 19 MMOL/L (ref 23–29)
CREAT SERPL-MCNC: 9.3 MG/DL (ref 0.5–1.4)
DIFFERENTIAL METHOD BLD: ABNORMAL
EOSINOPHIL # BLD AUTO: 0.1 K/UL (ref 0–0.5)
EOSINOPHIL NFR BLD: 1.2 % (ref 0–8)
ERYTHROCYTE [DISTWIDTH] IN BLOOD BY AUTOMATED COUNT: 15.6 % (ref 11.5–14.5)
EST. GFR  (NO RACE VARIABLE): 6 ML/MIN/1.73 M^2
GLUCOSE SERPL-MCNC: 108 MG/DL (ref 70–110)
HCT VFR BLD AUTO: 38.8 % (ref 40–54)
HGB BLD-MCNC: 12.6 G/DL (ref 14–18)
IMM GRANULOCYTES # BLD AUTO: 0.05 K/UL (ref 0–0.04)
IMM GRANULOCYTES NFR BLD AUTO: 0.7 % (ref 0–0.5)
LYMPHOCYTES # BLD AUTO: 0.8 K/UL (ref 1–4.8)
LYMPHOCYTES NFR BLD: 12 % (ref 18–48)
MCH RBC QN AUTO: 28.6 PG (ref 27–31)
MCHC RBC AUTO-ENTMCNC: 32.5 G/DL (ref 32–36)
MCV RBC AUTO: 88 FL (ref 82–98)
MONOCYTES # BLD AUTO: 0.8 K/UL (ref 0.3–1)
MONOCYTES NFR BLD: 11.2 % (ref 4–15)
NEUTROPHILS # BLD AUTO: 5.1 K/UL (ref 1.8–7.7)
NEUTROPHILS NFR BLD: 74.5 % (ref 38–73)
NRBC BLD-RTO: 0 /100 WBC
PLATELET # BLD AUTO: 239 K/UL (ref 150–450)
PMV BLD AUTO: 8.7 FL (ref 9.2–12.9)
POTASSIUM SERPL-SCNC: 4.7 MMOL/L (ref 3.5–5.1)
PROT SERPL-MCNC: 8.5 G/DL (ref 6–8.4)
RBC # BLD AUTO: 4.4 M/UL (ref 4.6–6.2)
SODIUM SERPL-SCNC: 135 MMOL/L (ref 136–145)
WBC # BLD AUTO: 6.86 K/UL (ref 3.9–12.7)

## 2023-12-11 PROCEDURE — 96374 THER/PROPH/DIAG INJ IV PUSH: CPT

## 2023-12-11 PROCEDURE — 36415 COLL VENOUS BLD VENIPUNCTURE: CPT | Performed by: PHYSICIAN ASSISTANT

## 2023-12-11 PROCEDURE — 93005 ELECTROCARDIOGRAM TRACING: CPT

## 2023-12-11 PROCEDURE — 25000003 PHARM REV CODE 250: Performed by: STUDENT IN AN ORGANIZED HEALTH CARE EDUCATION/TRAINING PROGRAM

## 2023-12-11 PROCEDURE — G0378 HOSPITAL OBSERVATION PER HR: HCPCS

## 2023-12-11 PROCEDURE — 63600175 PHARM REV CODE 636 W HCPCS: Performed by: STUDENT IN AN ORGANIZED HEALTH CARE EDUCATION/TRAINING PROGRAM

## 2023-12-11 PROCEDURE — 80053 COMPREHEN METABOLIC PANEL: CPT | Performed by: PHYSICIAN ASSISTANT

## 2023-12-11 PROCEDURE — 96361 HYDRATE IV INFUSION ADD-ON: CPT

## 2023-12-11 PROCEDURE — 99285 EMERGENCY DEPT VISIT HI MDM: CPT | Mod: 25

## 2023-12-11 PROCEDURE — 96375 TX/PRO/DX INJ NEW DRUG ADDON: CPT

## 2023-12-11 PROCEDURE — 96376 TX/PRO/DX INJ SAME DRUG ADON: CPT

## 2023-12-11 PROCEDURE — 85025 COMPLETE CBC W/AUTO DIFF WBC: CPT | Performed by: PHYSICIAN ASSISTANT

## 2023-12-11 PROCEDURE — 93010 ELECTROCARDIOGRAM REPORT: CPT | Mod: ,,, | Performed by: GENERAL PRACTICE

## 2023-12-11 RX ORDER — ONDANSETRON 2 MG/ML
4 INJECTION INTRAMUSCULAR; INTRAVENOUS
Status: COMPLETED | OUTPATIENT
Start: 2023-12-11 | End: 2023-12-11

## 2023-12-11 RX ORDER — HYDRALAZINE HYDROCHLORIDE 20 MG/ML
20 INJECTION INTRAMUSCULAR; INTRAVENOUS
Status: COMPLETED | OUTPATIENT
Start: 2023-12-11 | End: 2023-12-11

## 2023-12-11 RX ORDER — NIFEDIPINE 30 MG/1
90 TABLET, EXTENDED RELEASE ORAL
Status: COMPLETED | OUTPATIENT
Start: 2023-12-11 | End: 2023-12-11

## 2023-12-11 RX ORDER — POLYETHYLENE GLYCOL-3350 AND ELECTROLYTES WITH FLAVOR PACK 240; 5.84; 2.98; 6.72; 22.72 G/278.26G; G/278.26G; G/278.26G; G/278.26G; G/278.26G
4000 POWDER, FOR SOLUTION ORAL ONCE
Status: ON HOLD | COMMUNITY
Start: 2023-12-08 | End: 2023-12-18 | Stop reason: HOSPADM

## 2023-12-11 RX ORDER — TORSEMIDE 20 MG/1
1 TABLET ORAL DAILY
COMMUNITY
Start: 2023-07-26 | End: 2024-02-26 | Stop reason: CLARIF

## 2023-12-11 RX ORDER — HYDRALAZINE HYDROCHLORIDE 25 MG/1
100 TABLET, FILM COATED ORAL
Status: COMPLETED | OUTPATIENT
Start: 2023-12-11 | End: 2023-12-11

## 2023-12-11 RX ADMIN — HYDRALAZINE HYDROCHLORIDE 100 MG: 25 TABLET, FILM COATED ORAL at 07:12

## 2023-12-11 RX ADMIN — NIFEDIPINE 90 MG: 30 TABLET, FILM COATED, EXTENDED RELEASE ORAL at 07:12

## 2023-12-11 RX ADMIN — ONDANSETRON 4 MG: 2 INJECTION INTRAMUSCULAR; INTRAVENOUS at 07:12

## 2023-12-11 RX ADMIN — HYDRALAZINE HYDROCHLORIDE 20 MG: 20 INJECTION INTRAMUSCULAR; INTRAVENOUS at 09:12

## 2023-12-11 RX ADMIN — SODIUM CHLORIDE 500 ML: 9 INJECTION, SOLUTION INTRAVENOUS at 07:12

## 2023-12-11 RX ADMIN — HYDRALAZINE HYDROCHLORIDE 20 MG: 20 INJECTION INTRAMUSCULAR; INTRAVENOUS at 08:12

## 2023-12-11 NOTE — Clinical Note
"Catalino Montenegro" Bogdan was seen and treated in our emergency department on 12/11/2023.  He may return to work on 12/15/2023.  May need to limit physical work with the right hand until follow-up appointments     If you have any questions or concerns, please don't hesitate to call.      Cam Catalan MD"

## 2023-12-11 NOTE — FIRST PROVIDER EVALUATION
Emergency Department TeleTriage Encounter Note      CHIEF COMPLAINT    Chief Complaint   Patient presents with    Dizziness    Vomiting    Fall     For several days        VITAL SIGNS   Initial Vitals [12/11/23 1440]   BP Pulse Resp Temp SpO2   (!) 208/97 87 20 98.3 °F (36.8 °C) 97 %      MAP       --            ALLERGIES    Review of patient's allergies indicates:  No Known Allergies    PROVIDER TRIAGE NOTE  This is a teletriage evaluation of a 58 y.o. male presenting to the ED complaining of multiple complaints. Patient admitted last week. He reports vomiting, dizziness, and frequent falls for the past 4 days. He denies head injury.    Patient is alert and oriented. He speaks in complete sentences. He is sitting upright in the chair in no distress.     Initial orders will be placed and care will be transferred to an alternate provider when patient is roomed for a full evaluation. Any additional orders and the final disposition will be determined by that provider.         ORDERS  Labs Reviewed   CBC W/ AUTO DIFFERENTIAL   COMPREHENSIVE METABOLIC PANEL   URINALYSIS, REFLEX TO URINE CULTURE       ED Orders (720h ago, onward)      Start Ordered     Status Ordering Provider    12/11/23 1447 12/11/23 1446  Orthostatic blood pressure  Once         Ordered JEET, SAUNDRA G.    12/11/23 1446 12/11/23 1446  CBC auto differential  STAT         Pending Collection JEET, SAUNDRA G.    12/11/23 1446 12/11/23 1446  Comprehensive metabolic panel  STAT         Pending Collection JEET, SAUNDRA G.    12/11/23 1446 12/11/23 1446  Insert Saline lock IV  Once         Ordered JEET, SAUNDRA G.    12/11/23 1446 12/11/23 1446  EKG 12-lead  Once         Ordered JEET, SAUNDRA G.    12/11/23 1446 12/11/23 1446  Urinalysis, Reflex to Urine Culture Urine, Clean Catch  STAT         Ordered JEET, SAUNDRA G.              Virtual Visit Note: The provider triage portion of this emergency department evaluation and documentation was performed via  [employer], a HIPAA-compliant telemedicine application, in concert with a tele-presenter in the room. A face to face patient evaluation with one of my colleagues will occur once the patient is placed in an emergency department room.      DISCLAIMER: This note was prepared with [employer] voice recognition transcription software. Garbled syntax, mangled pronouns, and other bizarre constructions may be attributed to that software system.

## 2023-12-11 NOTE — ED NOTES
Catalino Mcfadden presents to the ED with c/o dizziness upon standing that started a few days ago. Patient reports that he has been vomiting for the past 4 days. He has been unable to keep his BP meds down. Patient arrives to the ED with BP of 232/110. Patient denies using any ambulation devices at home. He missed dialysis on Saturday secondary to vomiting and weakness. He is a TTS dialysis patient. Patient has an AV fistula to left upper arm and dialysis cath to left upper chest. Patient had a difficult time standing to get out of wheel chair from the waiting room, but was able to stand without assistance for orthostatic VS. Mucous membranes are pink and moist. Skin is warm, dry and intact. YEIMI VSS  Verified patient's name and date of birth.

## 2023-12-12 PROBLEM — I77.0 A-V FISTULA: Status: RESOLVED | Noted: 2023-12-12 | Resolved: 2023-12-12

## 2023-12-12 PROBLEM — R29.898 LOWER EXTREMITY WEAKNESS: Status: ACTIVE | Noted: 2023-12-12

## 2023-12-12 PROBLEM — R93.7 ABNORMAL MRI, SPINE: Status: ACTIVE | Noted: 2023-12-12

## 2023-12-12 PROBLEM — I16.1 HYPERTENSIVE EMERGENCY: Status: ACTIVE | Noted: 2023-12-12

## 2023-12-12 PROBLEM — I77.0 A-V FISTULA: Status: ACTIVE | Noted: 2023-12-12

## 2023-12-12 LAB
ALBUMIN SERPL BCP-MCNC: 2.5 G/DL (ref 3.5–5.2)
ALP SERPL-CCNC: 693 U/L (ref 55–135)
ALT SERPL W/O P-5'-P-CCNC: 25 U/L (ref 10–44)
ANION GAP SERPL CALC-SCNC: 16 MMOL/L (ref 8–16)
AST SERPL-CCNC: 48 U/L (ref 10–40)
BACTERIA #/AREA URNS HPF: ABNORMAL /HPF
BASOPHILS # BLD AUTO: 0.03 K/UL (ref 0–0.2)
BASOPHILS NFR BLD: 0.6 % (ref 0–1.9)
BILIRUB SERPL-MCNC: 1.2 MG/DL (ref 0.1–1)
BILIRUB UR QL STRIP: NEGATIVE
BUN SERPL-MCNC: 55 MG/DL (ref 6–20)
CALCIUM SERPL-MCNC: 9.3 MG/DL (ref 8.7–10.5)
CHLORIDE SERPL-SCNC: 102 MMOL/L (ref 95–110)
CLARITY UR: CLEAR
CO2 SERPL-SCNC: 16 MMOL/L (ref 23–29)
COLOR UR: YELLOW
CREAT SERPL-MCNC: 9.6 MG/DL (ref 0.5–1.4)
DIFFERENTIAL METHOD BLD: ABNORMAL
EOSINOPHIL # BLD AUTO: 0.1 K/UL (ref 0–0.5)
EOSINOPHIL NFR BLD: 1.8 % (ref 0–8)
ERYTHROCYTE [DISTWIDTH] IN BLOOD BY AUTOMATED COUNT: 15.8 % (ref 11.5–14.5)
EST. GFR  (NO RACE VARIABLE): 6 ML/MIN/1.73 M^2
GLUCOSE SERPL-MCNC: 158 MG/DL (ref 70–110)
GLUCOSE UR QL STRIP: ABNORMAL
HCT VFR BLD AUTO: 34.5 % (ref 40–54)
HGB BLD-MCNC: 11.1 G/DL (ref 14–18)
HGB UR QL STRIP: NEGATIVE
HYALINE CASTS #/AREA URNS LPF: 0 /LPF
IMM GRANULOCYTES # BLD AUTO: 0.02 K/UL (ref 0–0.04)
IMM GRANULOCYTES NFR BLD AUTO: 0.4 % (ref 0–0.5)
KETONES UR QL STRIP: NEGATIVE
LEUKOCYTE ESTERASE UR QL STRIP: ABNORMAL
LYMPHOCYTES # BLD AUTO: 0.5 K/UL (ref 1–4.8)
LYMPHOCYTES NFR BLD: 9.7 % (ref 18–48)
MAGNESIUM SERPL-MCNC: 2.2 MG/DL (ref 1.6–2.6)
MCH RBC QN AUTO: 28.9 PG (ref 27–31)
MCHC RBC AUTO-ENTMCNC: 32.2 G/DL (ref 32–36)
MCV RBC AUTO: 90 FL (ref 82–98)
MICROSCOPIC COMMENT: ABNORMAL
MONOCYTES # BLD AUTO: 0.6 K/UL (ref 0.3–1)
MONOCYTES NFR BLD: 11.7 % (ref 4–15)
NEUTROPHILS # BLD AUTO: 3.7 K/UL (ref 1.8–7.7)
NEUTROPHILS NFR BLD: 75.8 % (ref 38–73)
NITRITE UR QL STRIP: NEGATIVE
NRBC BLD-RTO: 0 /100 WBC
PH UR STRIP: 7 [PH] (ref 5–8)
PLATELET # BLD AUTO: 212 K/UL (ref 150–450)
PMV BLD AUTO: 9.6 FL (ref 9.2–12.9)
POCT GLUCOSE: 108 MG/DL (ref 70–110)
POCT GLUCOSE: 121 MG/DL (ref 70–110)
POCT GLUCOSE: 93 MG/DL (ref 70–110)
POCT GLUCOSE: 98 MG/DL (ref 70–110)
POTASSIUM SERPL-SCNC: 4.1 MMOL/L (ref 3.5–5.1)
PROT SERPL-MCNC: 7.1 G/DL (ref 6–8.4)
PROT UR QL STRIP: ABNORMAL
RBC # BLD AUTO: 3.84 M/UL (ref 4.6–6.2)
RBC #/AREA URNS HPF: 1 /HPF (ref 0–4)
SODIUM SERPL-SCNC: 134 MMOL/L (ref 136–145)
SP GR UR STRIP: 1.01 (ref 1–1.03)
SQUAMOUS #/AREA URNS HPF: 0 /HPF
URN SPEC COLLECT METH UR: ABNORMAL
UROBILINOGEN UR STRIP-ACNC: NEGATIVE EU/DL
WBC # BLD AUTO: 4.94 K/UL (ref 3.9–12.7)
WBC #/AREA URNS HPF: 7 /HPF (ref 0–5)

## 2023-12-12 PROCEDURE — 83735 ASSAY OF MAGNESIUM: CPT

## 2023-12-12 PROCEDURE — 63600175 PHARM REV CODE 636 W HCPCS

## 2023-12-12 PROCEDURE — 97161 PT EVAL LOW COMPLEX 20 MIN: CPT

## 2023-12-12 PROCEDURE — 96372 THER/PROPH/DIAG INJ SC/IM: CPT

## 2023-12-12 PROCEDURE — G0378 HOSPITAL OBSERVATION PER HR: HCPCS

## 2023-12-12 PROCEDURE — 97535 SELF CARE MNGMENT TRAINING: CPT

## 2023-12-12 PROCEDURE — 80100014 HC HEMODIALYSIS 1:1

## 2023-12-12 PROCEDURE — 80053 COMPREHEN METABOLIC PANEL: CPT

## 2023-12-12 PROCEDURE — 25000003 PHARM REV CODE 250

## 2023-12-12 PROCEDURE — 81000 URINALYSIS NONAUTO W/SCOPE: CPT | Performed by: PHYSICIAN ASSISTANT

## 2023-12-12 PROCEDURE — 97165 OT EVAL LOW COMPLEX 30 MIN: CPT

## 2023-12-12 PROCEDURE — 94760 N-INVAS EAR/PLS OXIMETRY 1: CPT

## 2023-12-12 PROCEDURE — A4216 STERILE WATER/SALINE, 10 ML: HCPCS

## 2023-12-12 PROCEDURE — 5A1D70Z PERFORMANCE OF URINARY FILTRATION, INTERMITTENT, LESS THAN 6 HOURS PER DAY: ICD-10-PCS | Performed by: INTERNAL MEDICINE

## 2023-12-12 PROCEDURE — 96375 TX/PRO/DX INJ NEW DRUG ADDON: CPT

## 2023-12-12 PROCEDURE — 94761 N-INVAS EAR/PLS OXIMETRY MLT: CPT

## 2023-12-12 PROCEDURE — 85025 COMPLETE CBC W/AUTO DIFF WBC: CPT

## 2023-12-12 PROCEDURE — 99900035 HC TECH TIME PER 15 MIN (STAT)

## 2023-12-12 PROCEDURE — 97530 THERAPEUTIC ACTIVITIES: CPT

## 2023-12-12 PROCEDURE — 36415 COLL VENOUS BLD VENIPUNCTURE: CPT

## 2023-12-12 RX ORDER — SODIUM CHLORIDE 9 MG/ML
INJECTION, SOLUTION INTRAVENOUS
Status: DISCONTINUED | OUTPATIENT
Start: 2023-12-12 | End: 2023-12-18 | Stop reason: HOSPADM

## 2023-12-12 RX ORDER — IPRATROPIUM BROMIDE AND ALBUTEROL SULFATE 2.5; .5 MG/3ML; MG/3ML
3 SOLUTION RESPIRATORY (INHALATION) EVERY 4 HOURS PRN
Status: DISCONTINUED | OUTPATIENT
Start: 2023-12-12 | End: 2023-12-18 | Stop reason: HOSPADM

## 2023-12-12 RX ORDER — GUAIFENESIN 100 MG/5ML
81 LIQUID (ML) ORAL DAILY
Qty: 30 TABLET | Refills: 0 | Status: SHIPPED | OUTPATIENT
Start: 2023-12-12 | End: 2024-02-26

## 2023-12-12 RX ORDER — NALOXONE HCL 0.4 MG/ML
0.02 VIAL (ML) INJECTION
Status: DISCONTINUED | OUTPATIENT
Start: 2023-12-12 | End: 2023-12-18 | Stop reason: HOSPADM

## 2023-12-12 RX ORDER — LABETALOL HYDROCHLORIDE 5 MG/ML
10 INJECTION, SOLUTION INTRAVENOUS ONCE
Status: COMPLETED | OUTPATIENT
Start: 2023-12-12 | End: 2023-12-12

## 2023-12-12 RX ORDER — AMITRIPTYLINE HYDROCHLORIDE 10 MG/1
10 TABLET, FILM COATED ORAL NIGHTLY
Status: DISCONTINUED | OUTPATIENT
Start: 2023-12-12 | End: 2023-12-18 | Stop reason: HOSPADM

## 2023-12-12 RX ORDER — NIFEDIPINE 30 MG/1
90 TABLET, EXTENDED RELEASE ORAL DAILY
Status: DISCONTINUED | OUTPATIENT
Start: 2023-12-12 | End: 2023-12-18 | Stop reason: HOSPADM

## 2023-12-12 RX ORDER — PANTOPRAZOLE SODIUM 40 MG/1
40 TABLET, DELAYED RELEASE ORAL DAILY
Status: DISCONTINUED | OUTPATIENT
Start: 2023-12-12 | End: 2023-12-18 | Stop reason: HOSPADM

## 2023-12-12 RX ORDER — SODIUM CHLORIDE 9 MG/ML
INJECTION, SOLUTION INTRAVENOUS ONCE
Status: DISCONTINUED | OUTPATIENT
Start: 2023-12-12 | End: 2023-12-12

## 2023-12-12 RX ORDER — CHLORPROMAZINE HYDROCHLORIDE 25 MG/1
25 TABLET, FILM COATED ORAL 3 TIMES DAILY PRN
Status: DISCONTINUED | OUTPATIENT
Start: 2023-12-12 | End: 2023-12-18 | Stop reason: HOSPADM

## 2023-12-12 RX ORDER — IBUPROFEN 200 MG
16 TABLET ORAL
Status: DISCONTINUED | OUTPATIENT
Start: 2023-12-12 | End: 2023-12-18 | Stop reason: HOSPADM

## 2023-12-12 RX ORDER — IBUPROFEN 200 MG
24 TABLET ORAL
Status: DISCONTINUED | OUTPATIENT
Start: 2023-12-12 | End: 2023-12-18 | Stop reason: HOSPADM

## 2023-12-12 RX ORDER — ONDANSETRON 2 MG/ML
4 INJECTION INTRAMUSCULAR; INTRAVENOUS EVERY 6 HOURS PRN
Status: DISCONTINUED | OUTPATIENT
Start: 2023-12-12 | End: 2023-12-18 | Stop reason: HOSPADM

## 2023-12-12 RX ORDER — PROCHLORPERAZINE EDISYLATE 5 MG/ML
5 INJECTION INTRAMUSCULAR; INTRAVENOUS EVERY 6 HOURS PRN
Status: DISCONTINUED | OUTPATIENT
Start: 2023-12-12 | End: 2023-12-18 | Stop reason: HOSPADM

## 2023-12-12 RX ORDER — TALC
9 POWDER (GRAM) TOPICAL NIGHTLY PRN
Status: DISCONTINUED | OUTPATIENT
Start: 2023-12-12 | End: 2023-12-18 | Stop reason: HOSPADM

## 2023-12-12 RX ORDER — INSULIN ASPART 100 [IU]/ML
0-10 INJECTION, SOLUTION INTRAVENOUS; SUBCUTANEOUS
Status: DISCONTINUED | OUTPATIENT
Start: 2023-12-12 | End: 2023-12-18 | Stop reason: HOSPADM

## 2023-12-12 RX ORDER — HEPARIN SODIUM 5000 [USP'U]/ML
5000 INJECTION, SOLUTION INTRAVENOUS; SUBCUTANEOUS EVERY 8 HOURS
Status: DISCONTINUED | OUTPATIENT
Start: 2023-12-12 | End: 2023-12-18 | Stop reason: HOSPADM

## 2023-12-12 RX ORDER — MAG HYDROX/ALUMINUM HYD/SIMETH 200-200-20
30 SUSPENSION, ORAL (FINAL DOSE FORM) ORAL 4 TIMES DAILY PRN
Status: DISCONTINUED | OUTPATIENT
Start: 2023-12-12 | End: 2023-12-18 | Stop reason: HOSPADM

## 2023-12-12 RX ORDER — SODIUM CHLORIDE 0.9 % (FLUSH) 0.9 %
10 SYRINGE (ML) INJECTION EVERY 8 HOURS
Status: DISCONTINUED | OUTPATIENT
Start: 2023-12-12 | End: 2023-12-18 | Stop reason: HOSPADM

## 2023-12-12 RX ORDER — HEPARIN SODIUM 5000 [USP'U]/ML
5000 INJECTION, SOLUTION INTRAVENOUS; SUBCUTANEOUS
Status: DISCONTINUED | OUTPATIENT
Start: 2023-12-12 | End: 2023-12-14

## 2023-12-12 RX ORDER — ACETAMINOPHEN 500 MG
1000 TABLET ORAL EVERY 6 HOURS PRN
Status: DISCONTINUED | OUTPATIENT
Start: 2023-12-12 | End: 2023-12-18 | Stop reason: HOSPADM

## 2023-12-12 RX ORDER — GLUCAGON 1 MG
1 KIT INJECTION
Status: DISCONTINUED | OUTPATIENT
Start: 2023-12-12 | End: 2023-12-18 | Stop reason: HOSPADM

## 2023-12-12 RX ORDER — AMOXICILLIN 250 MG
1 CAPSULE ORAL 2 TIMES DAILY PRN
Status: DISCONTINUED | OUTPATIENT
Start: 2023-12-12 | End: 2023-12-18 | Stop reason: HOSPADM

## 2023-12-12 RX ORDER — ONDANSETRON 4 MG/1
8 TABLET, ORALLY DISINTEGRATING ORAL EVERY 8 HOURS PRN
Qty: 30 TABLET | Refills: 0 | Status: SHIPPED | OUTPATIENT
Start: 2023-12-12

## 2023-12-12 RX ORDER — HYDRALAZINE HYDROCHLORIDE 25 MG/1
100 TABLET, FILM COATED ORAL 3 TIMES DAILY
Status: DISCONTINUED | OUTPATIENT
Start: 2023-12-12 | End: 2023-12-18 | Stop reason: HOSPADM

## 2023-12-12 RX ADMIN — HYDRALAZINE HYDROCHLORIDE 100 MG: 25 TABLET, FILM COATED ORAL at 08:12

## 2023-12-12 RX ADMIN — HYDRALAZINE HYDROCHLORIDE 100 MG: 25 TABLET, FILM COATED ORAL at 04:12

## 2023-12-12 RX ADMIN — NIFEDIPINE 90 MG: 30 TABLET, FILM COATED, EXTENDED RELEASE ORAL at 08:12

## 2023-12-12 RX ADMIN — PANTOPRAZOLE SODIUM 40 MG: 40 TABLET, DELAYED RELEASE ORAL at 08:12

## 2023-12-12 RX ADMIN — HEPARIN SODIUM 5000 UNITS: 5000 INJECTION, SOLUTION INTRAVENOUS; SUBCUTANEOUS at 10:12

## 2023-12-12 RX ADMIN — Medication 10 ML: at 04:12

## 2023-12-12 RX ADMIN — Medication 10 ML: at 06:12

## 2023-12-12 RX ADMIN — HEPARIN SODIUM 5000 UNITS: 5000 INJECTION, SOLUTION INTRAVENOUS; SUBCUTANEOUS at 06:12

## 2023-12-12 RX ADMIN — LABETALOL HYDROCHLORIDE 10 MG: 5 INJECTION, SOLUTION INTRAVENOUS at 01:12

## 2023-12-12 NOTE — CONSULTS
[facility]  Neurology Consult    Patient Name: Catalino Mcfadden  MRN: [id number]  : 1965  TODAY'S DATE: 2023  ADMIT DATE: 2023  5:29 PM                                          CONSULTED PROVIDER: Patricia Nguyen MD, Neurologist. On-call Phone: [phone]  CONSULT REQUESTED BY: Judy Lane MD     Chief Complaint   Patient presents with    Dizziness    Vomiting    Fall     For several days        HPI per EMR:   Catalino Mcfadden is a 59 y/o M with past medical history significant for HTN, ESRD on HD, DM2, gastroparesis, diabetic foot ulcer, chronic hiccups, and HLD who presented to the ED for with episodic dizziness, vomiting, and lower extremity weakness following a fall x 1 day. He reports nausea has been resolved with Zofran. He denies chest pain, shortness of breath, fever, chills, abdominal pain, headaches, light-headedness, numbness, tingling, or LOC. He reports he missed his Saturday hemodialysis treatment but states last week he received HD Tues, Wed, Thurs, during hospitaliztion. He was recently discharged . Lab work reflective of ESRD with elevated bili, ALP, and AST. In the ED blood pressure 200s/100s, received IV and PO antihypertensive medication with mild improvemnt. He states he did not take his medication yesterday due to N/V. During examination patient states he is unable to lift his lower extremities, however, was able to walk into the ED with assistance from a friend. CT Head negative. Admitted to hospital medicine for hypertensive emergency evaluation and treatment with nephrology and neurology consults placed.      Neurology Consult:  Patient was seen and examined by me today. He is a 59 yo man with history of HTN, ESRD on HD, DM2, gastroparesis, diabetic foot ulcer, chronic hiccups, and HLD who presented to the ED with complaints of generalized weakness, episodes of dizziness, nausea and vomiting. He states that about 6 months ago he began noticing that  his legs were not as strong as before, but he was still able to ambulate unassisted. However, for the past few weeks, he has become weaker to the point that he is no longer able to ambulate unassisted. He also complains of right arm numbness and weakness for the past few weeks. His blood pressure was elevated, but he reports that he had issues with his medications due to the persistent nausea and vomiting. Neurology consulted for evaluation of weakness.    Review of Systems:    A comprehensive ROS was performed and all systems were negative except as noted above in HPI.    Past Medical History:  has a past medical history of Diabetes mellitus, type 2, Diabetic foot ulcer associated with diabetes mellitus due to underlying condition (07/16/2020), ESRD on hemodialysis, Hyperlipidemia, Hypertension, and Obese.    Past Surgical History:  has a past surgical history that includes Cervical disc surgery (07/11/2016); left leg orthopedic surgery (Left); Debridement (Left, 07/17/2020); Bone biopsy (Left, 07/17/2020); Debridement of foot (Right); Fracture surgery (Right); Insertion of tunneled central venous catheter (CVC) with subcutaneous port (N/A, 06/11/2021); Esophagogastroduodenoscopy (N/A, 12/16/2022); Esophageal manometry with measurement of impedance (N/A, 03/27/2023); AV fistula placement (Left, 07/06/2023); Upper gastrointestinal endoscopy; and Esophagogastroduodenoscopy (N/A, 12/5/2023).    Family Medical History: family history includes Heart disease in his father. He was adopted.    Social History:  reports that he has never smoked. He has never used smokeless tobacco. He reports that he does not currently use alcohol. He reports that he does not use drugs.    PCP: No, Primary Doctor    Allergies: Review of patient's allergies indicates:  No Known Allergies    Medications:   No current facility-administered medications on file prior to encounter.     Current Outpatient Medications on File Prior to Encounter    Medication Sig Dispense Refill    hydrALAZINE (APRESOLINE) 100 MG tablet Take 1 tablet (100 mg total) by mouth 3 (three) times daily. 90 tablet 0    NIFEdipine (PROCARDIA XL) 90 MG (OSM) 24 hr tablet Take 1 tablet (90 mg total) by mouth once daily. 90 tablet 3    ondansetron (ZOFRAN-ODT) 4 MG TbDL Take 1 tablet (4 mg total) by mouth every 6 (six) hours as needed (nausea and vomiting). 30 tablet 3    promethazine (PHENERGAN) 12.5 MG Tab Take 1 tablet (12.5 mg total) by mouth every 6 (six) hours as needed (nausea and vomiting). 30 tablet 3    amitriptyline (ELAVIL) 10 MG tablet Take 1 tablet (10 mg total) by mouth every evening. (Patient not taking: Reported on 12/12/2023) 30 tablet 0    aspirin 81 MG Chew Take 1 tablet (81 mg total) by mouth once daily. (Patient not taking: Reported on 12/12/2023) 30 tablet 0    atorvastatin (LIPITOR) 40 MG tablet Take 1 tablet (40 mg total) by mouth once daily. (Patient not taking: Reported on 12/12/2023) 30 tablet 0    baclofen (LIORESAL) 5 mg Tab tablet Take 1 tablet (5 mg total) by mouth 3 (three) times daily. (Patient not taking: Reported on 12/12/2023) 90 tablet 0    bethanechol (URECHOLINE) 10 MG Tab Take 1 tablet by mouth 3 (three) times daily.      chlorproMAZINE (THORAZINE) 25 MG tablet Take 1 tablet (25 mg total) by mouth 3 (three) times daily as needed (hiccups). (Patient not taking: Reported on 12/12/2023) 90 tablet 0    gabapentin (NEURONTIN) 100 MG capsule Take 1 capsule (100 mg total) by mouth 3 (three) times daily. (Patient not taking: Reported on 12/12/2023) 90 capsule 11    GAVILYTE-C 240-22.72-6.72 -5.84 gram SolR Take 4,000 mLs by mouth once.      heparin sod,pork in 0.45% NaCl (HEPARIN, PORCINE, 100 UNITS) 100 unit/100 mL (1 unit/mL) SolP in sodium chloride 0.45 % 100 mL infusion Inject 1 mL/hr into the vein continuous.      methoxy peg-epoetin beta (MIRCERA INJ) Inject 50 mcg into the skin every 28 days.      pantoprazole (PROTONIX) 40 MG tablet Take 1 tablet  (40 mg total) by mouth once daily. (Patient not taking: Reported on 12/12/2023) 90 tablet 3    sevelamer carbonate (RENVELA) 800 mg Tab Take 1 tablet (800 mg total) by mouth 3 (three) times daily with meals. (Patient not taking: Reported on 12/12/2023) 90 tablet 11    sucroferric oxyhydroxide (VELPHORO) 500 mg Chew Take 1 tablet by mouth once daily.      torsemide (DEMADEX) 20 MG Tab Take 1 tablet by mouth once daily.       Scheduled Meds:   amitriptyline  10 mg Oral QHS    heparin (porcine)  5,000 Units Subcutaneous Q8H    hydrALAZINE  100 mg Oral TID    NIFEdipine  90 mg Oral Daily    pantoprazole  40 mg Oral Daily    sodium chloride 0.9%  10 mL Intravenous Q8H     Continuous Infusions:  PRN Meds:.sodium chloride 0.9%, acetaminophen, albuterol-ipratropium, aluminum-magnesium hydroxide-simethicone, chlorproMAZINE, glucagon (human recombinant), glucose, glucose, heparin (porcine), insulin aspart U-100, melatonin, naloxone, ondansetron, prochlorperazine, senna-docusate 8.6-50 mg, sodium chloride 0.9%      Physical Exam  Current Vitals:  Vitals:    12/12/23 0325   BP: (!) 170/82   Pulse: 78   Resp: 18   Temp: 97.6 °F (36.4 °C)       Physical Exam:  General: AO x3  HEENT: PERRL, EOMI  CV: RRR  Lungs: no respiratory distress  Abdomen: soft    Neurological Exam    MENTAL STATUS EXAM:  Level of alertness: Alert  Level of attention: Attentive w/out deficit  Orientation/Awareness: intact to person, place, time, situation  Language: fluent. Comprehension/repetition/naming intact    CRANIAL NERVE EXAM:  II/III: fundoscopic exam deferred, PERRL; visual fields full to confrontation  III/IV/VI: EOMI w/out evidence of nystagmus, strabismus, or evoked diplopia  V: no deficits appreciated to light touch  VII: no facial asymmetry noted  VIII: no deficits in hearing bilaterally  IX/X: palate @ ML and raises symmetrically  XI: shoulder shrug 5/5 bilaterally  XII: tongue to midline w/out asymmetry  No dysarthria noted on exam.    MOTOR  "EXAM:  Bulk and Tone: decreased bulk, normal tone.  Strength is 4/5 in right upper ext, 4+/5 left upper, 4/5 left lower, 3/5 right lower.    REFLEXES:  2+ in bilateral upper and lower extremities except right patellar 1+ and absent ankles.    SENSORY EXAM  Decreased sensation in the right upper extremity, and both feet. Rest intact.    COORDINATION/CEREBELLAR EXAM:  FTN: no dysmetria or other signs of appendicular ataxia  HTS: unable to assess    GAIT:  Deferred for safety.    Laboratory Data & Studies    Recent Labs   Lab 12/06/23 0443 12/11/23 1728 12/12/23 0414   WBC 3.96 6.86 4.94   HGB 9.6* 12.6* 11.1*    239 212   MCV 91 88 90       Recent Labs   Lab 12/06/23 0443 12/07/23 0452 12/11/23 1728 12/12/23 0414   * 133* 135* 134*   K 4.4 4.1 4.7 4.1   CL 99 99 101 102   CO2 20* 23 19* 16*   BUN 26* 16 53* 55*   GLU 82 73 108 158*   CALCIUM 8.5* 8.7 10.2 9.3   MG 1.9 1.8  --  2.2   PHOS 3.5 2.9  --   --        Recent Labs   Lab 12/07/23 0452 12/11/23 1728 12/12/23 0414   PROT 6.6 8.5* 7.1   ALBUMIN 2.3* 3.0* 2.5*   BILITOT 1.4* 1.5* 1.2*   AST 38 64* 48*   ALT 14 30 25   ALKPHOS 751* 854* 693*       No results for input(s): "LABPT", "INR", "APTT" in the last 168 hours.    No results for input(s): "HGBA1C", "CHOL", "TRIG", "LDLCALC", "HDL", "TSH" in the last 168 hours.      Microbiology:  Microbiology Results (last 7 days)       ** No results found for the last 168 hours. **              Imaging:  US Abdomen Complete    Result Date: 12/5/2023  EXAMINATION: US ABDOMEN COMPLETE CLINICAL HISTORY: elevated total bilirubin; TECHNIQUE: Complete abdominal ultrasound (including pancreas, aorta, liver, gallbladder, common bile duct, IVC, kidneys, and spleen) was performed. COMPARISON: 11/20/2020 FINDINGS: Pancreas: Obscured by overlying bowel gas. Aorta: Obscured Liver: 17 cm, normal in size. Homogeneous parenchymal echotexture. No focal lesions. Gallbladder: Multiple gallstones are present measuring up " to 3.5 cm.  No wall thickening or pericholecystic fluid.  Sonographic Logan sign is negative. Biliary system: 5.5 mm common bile duct.  No intrahepatic ductal dilatation. Inferior vena cava: Normal in appearance. Right kidney: 10.6 cm. No hydronephrosis. Left kidney: 10.8 cm. No hydronephrosis. Spleen: 14.8 cm.  Mildly enlarged. Miscellaneous: No ascites.     Cholelithiasis. Mild splenomegaly. Electronically signed by: James Arguello MD Date:    12/05/2023 Time:    09:22    X-Ray Chest AP Portable    Result Date: 12/4/2023  EXAMINATION: XR CHEST AP PORTABLE CLINICAL HISTORY: [place]ough TECHNIQUE: Single frontal view of the chest was performed. COMPARISON: 02/22/2023 FINDINGS: There is a right-sided internal jugular catheter with the tip in the superior vena cava.  There is patchy opacification of the right mid lung zone new from prior exam.     There is patchy opacification the right mid lung zone new from prior exam and concerning for developing pneumonia. Electronically signed by: Danisha Edgar MD Date:    12/04/2023 Time:    13:30        Assessment and Plan:    Generalized weakness  Recurrent falls   Dizziness, nausea and vomiting  Lumbar spine lesions - concern for metastatic disease or MM  59 yo man with history of HTN, ESRD on HD, DM2, gastroparesis, diabetic foot ulcer, chronic hiccups, and HLD who presented to the ED with complaints of generalized weakness, episodes of dizziness, nausea and vomiting for at least the past few weeks, now unable to ambulate unassisted.  His neuro exam does show right sided weakness and numbness, as well as RLE hyporreflexia concerning for radicular compression. MRI L-spine revealed osseous lesions concerning for metastatic disease or multiple myeloma, so will order further workup. Brain MRI negative for stroke.  - Admitted to hospital medicine with q4 hour neuro checks, on telemetry, continuous pulse oximetry  - Diet after eval per SLP  - MRI brain as above  - MRI L-spine  concerning for osseous lesions, so will order cervical and thoracic spine  - Also ordering CT chest, abdomen and pelvis without contrast due to ESRD to assess for occult malignancy  - Ordered CK and Voltage gated calcium channel antibodies (VGCC Abs) for Lambert-Eaton Myasthenic Syndrome   - Maintain Euthermia with Tylenol prn temp > 37.2 degrees C.  - Assessment for rehab with PT/OT/SLP evaluation and treatment.  - workup and treatment of metabolic and infectious abnormalities as per primary team  - Agree with oncology consultation, also consider neurosurgery   - Patient may need EMG/nerve conduction studies after he is discharged  - Will follow along    DVT prophylaxis with chemo/SCD prophylaxis    Patient to [facility] up with [facility] at [phone] within 7 days from discharge.       All questions were answered.                              Thank you kindly for including us in the care of this patient. Please do not hesitate to contact us with any questions.       75 minutes of care time has been spent evaluating with the patient. Time includes chart review not limited to diagnostic imaging, labs, and vitals, patient assessment, discussion with family and nursing, current order evaluations, and new order entries.      Patricia Nguyen MD  Neurology

## 2023-12-12 NOTE — NURSING
Nurses Note -- 4 Eyes      12/12/2023   2:46 AM      Skin assessed during: Admit      [x] No Altered Skin Integrity Present    []Prevention Measures Documented      [] Yes- Altered Skin Integrity Present or Discovered   [] LDA Added if Not in Epic (Describe Wound)   [] New Altered Skin Integrity was Present on Admit and Documented in LDA   [] Wound Image Taken    Wound Care Consulted? No    Attending Nurse:  Meño Farrell RN/Staff Member:

## 2023-12-12 NOTE — HPI
Catalino Mcfadden is a 57 y/o M with past medical history significant for HTN, ESRD on HD, DM2, gastroparesis, diabetic foot ulcer, chronic hiccups, and HLD who presented to the ED for with episodic dizziness, vomiting, and lower extremity weakness following a fall x 1 day. He reports nausea has been resolved with Zofran. He denies chest pain, shortness of breath, fever, chills, abdominal pain, headaches, light-headedness, numbness, tingling, or LOC. He reports he missed his Saturday hemodialysis treatment but states last week he received HD Tues, Wed, Thurs, during hospitaliztion. He was recently discharged 12/07. Lab work reflective of ESRD with elevated bili, ALP, and AST. In the ED blood pressure 200s/100s, received IV and PO antihypertensive medication with mild improvemnt. He states he did not take his medication yesterday due to N/V. During examination patient states he is unable to lift his lower extremities, however, was able to walk into the ED with assistance from a friend. CT Head negative. Admitted to hospital medicine for hypertensive emergency evaluation and treatment with nephrology and neurology consults placed.

## 2023-12-12 NOTE — PT/OT/SLP EVAL
Physical Therapy Evaluation    Patient Name:  Catalino Mcfadden   MRN:  [id number]    Recommendations:     Discharge Recommendations: High Intensity Therapy   Discharge Equipment Recommendations: other (see comments) (TBD)   Barriers to discharge: None    Assessment:     Catalino Mcfadden is a 58 y.o. male admitted with a medical diagnosis of Hypertensive emergency.  He presents with the following impairments/functional limitations: weakness, impaired endurance, impaired functional mobility, gait instability, impaired balance, decreased lower extremity function, decreased ROM, edema .  Patient agreeable to PT evaluation this morning but reports feeling very weak.  Patient presented supine in bed and required min assist to transfer to sitting.  Patient then transferred to stand x 4 times each time with min assist and a RW.  Patient then stood 4 x 15-50 seconds RW min assist to prevent falling because each time standing bilateral LE would start to buckle but patient able to catch self only needing min assist to prevent a fall.  Patient a definite fall risk with all mobility currently.    Rehab Prognosis: Good; patient would benefit from acute skilled PT services to address these deficits and reach maximum level of function.    Recent Surgery: * No surgery found *      Plan:     During this hospitalization, patient to be seen daily to address the identified rehab impairments via gait training, therapeutic activities, therapeutic exercises and progress toward the following goals:    Plan of Care Expires:  01/17/24    Subjective     Chief Complaint: weakness  Patient/Family Comments/goals: get therapy to get stronger  Pain/Comfort:       Patients cultural, spiritual, Evangelical conflicts given the current situation:      Living Environment:  Currently lives with friend in a 1 story home with 1 step entry.  Prior to admission, patients level of function was modified independent.  Equipment used at home: walker, rolling.  DME owned (not  currently used): none.  Upon discharge, patient will have assistance from friend.    Objective:     Communicated with nurse prior to session.  Patient found supine with bed alarm  upon PT entry to room.    General Precautions: Standard, fall  Orthopedic Precautions:N/A   Braces:    Respiratory Status: Room air    Exams:  RLE ROM: Deficits: knee extension -10 degree taken in sitting  RLE Strength: Deficits: 2+/5 overall  LLE ROM: Deficits: knee extension -10 degree taken in sitting  LLE Strength: Deficits: 4-/5 overall    Functional Mobility:  Bed Mobility:     Supine to Sit: minimum assistance  Sit to Supine: moderate assistance  Transfers:     Sit to Stand:  minimum assistance with rolling walker      AM-PAC 6 CLICK MOBILITY  Total Score:16       Treatment & Education:  Transfer training supine to sit min, sit to supine mod, sit to stand x 4 times min RW  Standing tolerance 4 x 15-50 seconds RW min assist  Exercise to include ankle pumps and quad sets. All done bilateral LE x 10 reps with 3 seconds holds.  Patient instructed to perform frequently during the day    Patient left supine with call button in reach, bed alarm on, and nurse notified.    GOALS:   Multidisciplinary Problems       Physical Therapy Goals          Problem: Physical Therapy    Goal Priority Disciplines Outcome Goal Variances Interventions   Physical Therapy Goal     PT, PT/OT Ongoing, Progressing     Description: Goals to be met by: 23     Patient will increase functional independence with mobility by performin. Supine to sit with Stand-by Assistance  2. Sit to supine with Stand-by Assistance  3. Sit to stand transfer with Contact Guard Assistance  4. Bed to chair transfer with Contact Guard Assistance using Rolling Walker  5. Gait  x 25 feet with Contact Guard Assistance using Rolling Walker.                          History:     Past Medical History:   Diagnosis Date    Diabetes mellitus, type 2     Diabetic foot ulcer associated  with diabetes mellitus due to underlying condition 07/16/2020    ESRD on hemodialysis     Hyperlipidemia     Hypertension     Obese        Past Surgical History:   Procedure Laterality Date    AV FISTULA PLACEMENT Left 07/06/2023    Procedure: CREATION, AV FISTULA;  Surgeon: Daniel Poon MD;  Location: [facility];  Service: Vascular;  Laterality: Left;  RN PREOP 6/28/2023  LABS DAY OF SURGERY    BONE BIOPSY Left 07/17/2020    Procedure: BIOPSY, BONE;  Surgeon: Verona Whitmore DPM;  Location: [facility] OR;  Service: Podiatry;  Laterality: Left;    CERVICAL DISC SURGERY  07/11/2016    DEBRIDEMENT Left 07/17/2020    Procedure: DEBRIDEMENT;  Surgeon: Verona Whitmore DPM;  Location: [facility] OR;  Service: Podiatry;  Laterality: Left;    DEBRIDEMENT OF FOOT Right     toes & plantar surface    ESOPHAGEAL MANOMETRY WITH MEASUREMENT OF IMPEDANCE N/A 03/27/2023    Procedure: MANOMETRY, ESOPHAGUS, WITH IMPEDANCE MEASUREMENT;  Surgeon: Roopa Pérez MD;  [facility]  Service: Endoscopy;  Laterality: N/A;  Belching and rumination protocol please  instructions via portal - sm    ESOPHAGOGASTRODUODENOSCOPY N/A 12/16/2022    Procedure: EGD (ESOPHAGOGASTRODUODENOSCOPY);  Surgeon: Eren Francois MD;  Location: [facility];  Service: Endoscopy;  Laterality: N/A;  Pt. on Dialysis. K+ ordered prior to procedure.EC    ESOPHAGOGASTRODUODENOSCOPY N/A 12/5/2023    Procedure: EGD (ESOPHAGOGASTRODUODENOSCOPY);  Surgeon: Ksah Johnston MD;  [facility]: [facility] ENDO;  Service: Endoscopy;  Laterality: N/A;    FRACTURE SURGERY Right     medial ankle, metal plate present    INSERTION OF TUNNELED CENTRAL VENOUS CATHETER (CVC) WITH SUBCUTANEOUS PORT N/A 06/11/2021    Procedure: TUNNEL CATH INSERTION WITHOUT PORT;  Surgeon: Dosc Diagnostic Provider;  Location: [facility] OR;  Service: Radiology;  Laterality: N/A;  11AM START---PHONE PREOP 6/10/21---COVID POSTIVE ON 7/2020---NO S/S    left leg orthopedic surgery Left     UPPER  GASTROINTESTINAL ENDOSCOPY         Time Tracking:     PT Received On: 12/12/23  PT Start Time: 0825     PT Stop Time: 0851  PT Total Time (min): 26 min     Billable Minutes: Evaluation 15 and Therapeutic Activity 11      12/12/2023

## 2023-12-12 NOTE — H&P
Replaced by [facility]  History & Physical    Patient Name: Catalino Mcfadden  MRN: [id number]  Patient Class: OP- Observation  Admission Date: 12/11/2023  Attending Physician: Jduy Lane MD   Primary Care Provider: Lizbeth Primary Doctor         Patient information was obtained from patient, past medical records, and ER records.     Subjective:     Principal Problem:Hypertensive emergency    Chief Complaint:   Chief Complaint   Patient presents with    Dizziness    Vomiting    Fall     For several days         HPI: Catalino Mcfadden is a 59 y/o M with past medical history significant for HTN, ESRD on HD, DM2, gastroparesis, diabetic foot ulcer, chronic hiccups, and HLD who presented to the ED for with episodic dizziness, vomiting, and lower extremity weakness following a fall x 1 day. He reports nausea has been resolved with Zofran. He denies chest pain, shortness of breath, fever, chills, abdominal pain, headaches, light-headedness, numbness, tingling, or LOC. He reports he missed his Saturday hemodialysis treatment but states last week he received HD Tues, Wed, Thurs, during hospitaliztion. He was recently discharged 12/07. Lab work reflective of ESRD with elevated bili, ALP, and AST. In the ED blood pressure 200s/100s, received IV and PO antihypertensive medication with mild improvemnt. He states he did not take his medication yesterday due to N/V. During examination patient states he is unable to lift his lower extremities, however, was able to walk into the ED with assistance from a friend. CT Head negative. Admitted to hospital medicine for hypertensive emergency evaluation and treatment with nephrology and neurology consults placed.                     Past Medical History:   Diagnosis Date    Diabetes mellitus, type 2     Diabetic foot ulcer associated with diabetes mellitus due to underlying condition 07/16/2020    ESRD on hemodialysis     Hyperlipidemia     Hypertension     Obese         Past Surgical History:   Procedure Laterality Date    AV FISTULA PLACEMENT Left 07/06/2023    Procedure: CREATION, AV FISTULA;  Surgeon: Daniel Poon MD;  Location: [facility];  Service: Vascular;  Laterality: Left;  RN PREOP 6/28/2023  LABS DAY OF SURGERY    BONE BIOPSY Left 07/17/2020    Procedure: BIOPSY, BONE;  Surgeon: Verona Whitmore DPM;  Location: [facility] OR;  Service: Podiatry;  Laterality: Left;    CERVICAL DISC SURGERY  07/11/2016    DEBRIDEMENT Left 07/17/2020    Procedure: DEBRIDEMENT;  Surgeon: Verona Whitmore DPM;  Location: [facility] OR;  Service: Podiatry;  Laterality: Left;    DEBRIDEMENT OF FOOT Right     toes & plantar surface    ESOPHAGEAL MANOMETRY WITH MEASUREMENT OF IMPEDANCE N/A 03/27/2023    Procedure: MANOMETRY, ESOPHAGUS, WITH IMPEDANCE MEASUREMENT;  Surgeon: Roopa Pérez MD;  Location: [facility] (4TH FLR);  Service: Endoscopy;  Laterality: N/A;  Belching and rumination protocol please  instructions via portal - sm    ESOPHAGOGASTRODUODENOSCOPY N/A 12/16/2022    Procedure: EGD (ESOPHAGOGASTRODUODENOSCOPY);  Surgeon: Eren Francois MD;  Location: [facility];  Service: Endoscopy;  Laterality: N/A;  Pt. on Dialysis. K+ ordered prior to procedure.EC    ESOPHAGOGASTRODUODENOSCOPY N/A 12/5/2023    Procedure: EGD (ESOPHAGOGASTRODUODENOSCOPY);  Surgeon: Kash Johnston MD;  Location: [facility];  Service: Endoscopy;  Laterality: N/A;    FRACTURE SURGERY Right     medial ankle, metal plate present    INSERTION OF TUNNELED CENTRAL VENOUS CATHETER (CVC) WITH SUBCUTANEOUS PORT N/A 06/11/2021    Procedure: TUNNEL CATH INSERTION WITHOUT PORT;  Surgeon: Dosc Diagnostic Provider;  Location: [facility] OR;  Service: Radiology;  Laterality: N/A;  11AM START---PHONE PREOP 6/10/21---COVID POSTIVE ON 7/2020---NO S/S    left leg orthopedic surgery Left     UPPER GASTROINTESTINAL ENDOSCOPY         Review of patient's allergies indicates:  No Known Allergies    No current facility-administered  medications on file prior to encounter.     Current Outpatient Medications on File Prior to Encounter   Medication Sig    hydrALAZINE (APRESOLINE) 100 MG tablet Take 1 tablet (100 mg total) by mouth 3 (three) times daily.    NIFEdipine (PROCARDIA XL) 90 MG (OSM) 24 hr tablet Take 1 tablet (90 mg total) by mouth once daily.    ondansetron (ZOFRAN-ODT) 4 MG TbDL Take 1 tablet (4 mg total) by mouth every 6 (six) hours as needed (nausea and vomiting).    promethazine (PHENERGAN) 12.5 MG Tab Take 1 tablet (12.5 mg total) by mouth every 6 (six) hours as needed (nausea and vomiting).    amitriptyline (ELAVIL) 10 MG tablet Take 1 tablet (10 mg total) by mouth every evening. (Patient not taking: Reported on 12/12/2023)    aspirin 81 MG Chew Take 1 tablet (81 mg total) by mouth once daily. (Patient not taking: Reported on 12/12/2023)    atorvastatin (LIPITOR) 40 MG tablet Take 1 tablet (40 mg total) by mouth once daily. (Patient not taking: Reported on 12/12/2023)    baclofen (LIORESAL) 5 mg Tab tablet Take 1 tablet (5 mg total) by mouth 3 (three) times daily. (Patient not taking: Reported on 12/12/2023)    bethanechol (URECHOLINE) 10 MG Tab Take 1 tablet by mouth 3 (three) times daily.    chlorproMAZINE (THORAZINE) 25 MG tablet Take 1 tablet (25 mg total) by mouth 3 (three) times daily as needed (hiccups). (Patient not taking: Reported on 12/12/2023)    gabapentin (NEURONTIN) 100 MG capsule Take 1 capsule (100 mg total) by mouth 3 (three) times daily. (Patient not taking: Reported on 12/12/2023)    GAVILYTE-C 240-22.72-6.72 -5.84 gram SolR Take 4,000 mLs by mouth once.    heparin sod,pork in 0.45% NaCl (HEPARIN, PORCINE, 100 UNITS) 100 unit/100 mL (1 unit/mL) SolP in sodium chloride 0.45 % 100 mL infusion Inject 1 mL/hr into the vein continuous.    methoxy peg-epoetin beta (MIRCERA INJ) Inject 50 mcg into the skin every 28 days.    pantoprazole (PROTONIX) 40 MG tablet Take 1 tablet (40 mg total) by mouth once daily. (Patient  not taking: Reported on 12/12/2023)    sevelamer carbonate (RENVELA) 800 mg Tab Take 1 tablet (800 mg total) by mouth 3 (three) times daily with meals. (Patient not taking: Reported on 12/12/2023)    sucroferric oxyhydroxide (VELPHORO) 500 mg Chew Take 1 tablet by mouth once daily.    torsemide (DEMADEX) 20 MG Tab Take 1 tablet by mouth once daily.     Family History       Problem Relation (Age of Onset)    Heart disease Father          Tobacco Use    Smoking status: Never    Smokeless tobacco: Never   Substance and Sexual Activity    Alcohol use: Not Currently     Comment: occ rare beer    Drug use: No    Sexual activity: Not Currently     Review of Systems   Constitutional:  Positive for activity change and fatigue. Negative for appetite change, chills, diaphoresis and fever.   HENT: Negative.     Eyes: Negative.    Respiratory:  Negative for cough, shortness of breath and wheezing.    Cardiovascular:  Negative for chest pain and leg swelling.   Gastrointestinal:  Negative for abdominal distention, abdominal pain, constipation, diarrhea, nausea and vomiting.   Endocrine: Negative.    Genitourinary:  Negative for difficulty urinating and dysuria.   Musculoskeletal:  Negative for arthralgias, back pain and myalgias.   Neurological:  Positive for weakness (bilateral lower extremity). Negative for syncope, facial asymmetry, speech difficulty, light-headedness, numbness and headaches.   Hematological: Negative.    Psychiatric/Behavioral: Negative.       Objective:     Vital Signs (Most Recent):  Temp: 98.4 °F (36.9 °C) (12/12/23 0146)  Pulse: 69 (12/12/23 0146)  Resp: 18 (12/12/23 0146)  BP: (!) 206/93 (12/12/23 0146)  SpO2: 97 % (12/12/23 0146) Vital Signs (24h Range):  Temp:  [98.3 °F (36.8 °C)-98.4 °F (36.9 °C)] 98.4 °F (36.9 °C)  Pulse:  [66-92] 69  Resp:  [16-20] 18  SpO2:  [93 %-99 %] 97 %  BP: (189-262)/() 206/93     Weight: 91.1 kg (200 lb 13.4 oz)  Body mass index is 26.5 kg/m².     Physical Exam  Vitals  and nursing note reviewed.   Constitutional:       General: He is not in acute distress.     Appearance: Normal appearance. He is not toxic-appearing.   Cardiovascular:      Rate and Rhythm: Normal rate and regular rhythm.      Heart sounds: No murmur heard.     No gallop.   Pulmonary:      Effort: Pulmonary effort is normal. No respiratory distress.      Breath sounds: Normal breath sounds. No wheezing.   Abdominal:      General: Bowel sounds are normal. There is no distension.      Palpations: Abdomen is soft.      Tenderness: There is no abdominal tenderness.   Skin:     General: Skin is warm and dry.      Capillary Refill: Capillary refill takes less than 2 seconds.      Comments: Left upper arm AV Fistula noted with positive bruit and thrill   Neurological:      Mental Status: He is alert and oriented to person, place, and time. Mental status is at baseline.      GCS: GCS eye subscore is 4. GCS verbal subscore is 5. GCS motor subscore is 6.      Sensory: Sensation is intact.      Motor: Weakness (bilateral lower extremity) present.      Comments: Reports unable to lift legs against gravity due to weakness   Psychiatric:         Mood and Affect: Mood normal.                Significant Labs: All pertinent labs within the past 24 hours have been reviewed.  BMP:   Recent Labs   Lab 12/11/23  1728      *   K 4.7      CO2 19*   BUN 53*   CREATININE 9.3*   CALCIUM 10.2     CBC:   Recent Labs   Lab 12/11/23  1728   WBC 6.86   HGB 12.6*   HCT 38.8*        CMP:   Recent Labs   Lab 12/11/23  1728   *   K 4.7      CO2 19*      BUN 53*   CREATININE 9.3*   CALCIUM 10.2   PROT 8.5*   ALBUMIN 3.0*   BILITOT 1.5*   ALKPHOS 854*   AST 64*   ALT 30   ANIONGAP 15       Significant Imaging: I have reviewed all pertinent imaging results/findings within the past 24 hours.    PROCEDURE INFORMATION:   Exam: CT Head Without Contrast   Exam date and time: 12/11/2023 10:19 PM Age: 58 years old  Clinical indication: Dizziness TECHNIQUE: Imaging protocol: Computed tomography of the head without contrast. Radiation optimization: All CT scans at this facility use at least one of these dose optimization techniques: automated exposure control; mA and/or kV adjustment per patient size (includes targeted exams where dose is matched to clinical indication); or iterative reconstruction. COMPARISON: CT HEAD WITHOUT CONTRAST 2/22/2023 3:26 AM FINDINGS: Brain: Normal. No hemorrhage. Unremarkable white matter. No mass effect. Cerebral ventricles: No ventriculomegaly. Paranasal sinuses: Visualized sinuses are unremarkable. No fluid levels. Mastoid air cells: Visualized mastoid air cells are well aerated. Bones/joints: Unremarkable. No acute fracture. Soft tissues: Unremarkable. IMPRESSION: No acute intracranial abnormality. Dictated and Authenticated by: Hieu Garduno MD 12/11/2023 11:01 PM Central Time ([place] & [place])  Assessment/Plan:     * Hypertensive emergency  Patient has a current diagnosis of Hypertensive emergency with end organ damage evidenced by acute kidney injury which is uncontrolled.  Latest blood pressure and vitals reviewed-   Temp:  [98.3 °F (36.8 °C)]   Pulse:  [66-92]   Resp:  [16-20]   BP: (189-262)/()   SpO2:  [93 %-99 %] .   Patient currently on IV antihypertensives.   Home meds for hypertension were reviewed and noted below.   Hypertension Medications               hydrALAZINE (APRESOLINE) 100 MG tablet Take 1 tablet (100 mg total) by mouth 3 (three) times daily.    NIFEdipine (PROCARDIA XL) 90 MG (OSM) 24 hr tablet Take 1 tablet (90 mg total) by mouth once daily.    torsemide (DEMADEX) 20 MG Tab Take 1 tablet by mouth once daily.              Will aim for controlled BP reduction by medications noted above. Monitor and mitigate end organ damage as indicated.    Lower extremity weakness  Possibly due to hypertensive emergency; patient states unable to lift legs against gravity  S/p fall at home  "due to bilateral lower extremity weakness  CT head negative  Neurology consult placed      Serum total bilirubin elevated  Chronic condition noted  Monitor bmp    Intractable hiccups  Chronic condition  Continue prn thorazine       ESRD on hemodialysis  Creatine stable for now. BMP reviewed- noted Estimated Creatinine Clearance: 10.7 mL/min (A) (based on SCr of 9.3 mg/dL (H)). according to latest data. Based on current GFR, CKD stage is end stage.  Monitor UOP and serial BMP and adjust therapy as needed. Renally dose meds. Avoid nephrotoxic medications and procedures.  -AV fistula noted without complications    Type 2 diabetes mellitus with stage 5 chronic kidney disease  Patient's FSGs are controlled on current medication regimen.  Last A1c reviewed-   Lab Results   Component Value Date    HGBA1C 5.1 02/22/2023     Most recent fingerstick glucose reviewed- No results for input(s): "POCTGLUCOSE" in the last 24 hours.  Current correctional scale  Medium  Maintain anti-hyperglycemic dose as follows-   Antihyperglycemics (From admission, onward)      Start     Stop Route Frequency Ordered    12/12/23 0054  insulin aspart U-100 pen 0-10 Units         -- SubQ Before meals & nightly PRN 12/12/23 0054          Hold Oral hypoglycemics while patient is in the hospital.      VTE Risk Mitigation (From admission, onward)           Ordered     heparin (porcine) injection 5,000 Units  Every 8 hours         12/12/23 0054     heparin (porcine) injection 5,000 Units  As needed (PRN)         12/12/23 0056     IP VTE HIGH RISK PATIENT  Once         12/12/23 0054     Place sequential compression device  Until discontinued         12/12/23 0054                                  Erica Munguia DNP  Department of Hospital Medicine  [facility]          "

## 2023-12-12 NOTE — PLAN OF CARE
The pt is accepted by [facility] for admit 12/13/23., AVS updated.    12/12/23 1417   Post-Acute Status   Post-Acute Authorization Home Health   Home Health Status Set-up Complete/Auth obtained        12/12/23 1417   Post-Acute Status   Post-Acute Authorization Home Health   Home Health Status Set-up Complete/Auth obtained

## 2023-12-12 NOTE — ASSESSMENT & PLAN NOTE
"Patient's FSGs are controlled on current medication regimen.  Last A1c reviewed-   Lab Results   Component Value Date    HGBA1C 5.1 02/22/2023     Most recent fingerstick glucose reviewed- No results for input(s): "POCTGLUCOSE" in the last 24 hours.  Current correctional scale  Medium  Maintain anti-hyperglycemic dose as follows-   Antihyperglycemics (From admission, onward)      Start     Stop Route Frequency Ordered    12/12/23 0054  insulin aspart U-100 pen 0-10 Units         -- SubQ Before meals & nightly PRN 12/12/23 0054          Hold Oral hypoglycemics while patient is in the hospital.  "

## 2023-12-12 NOTE — ASSESSMENT & PLAN NOTE
Possibly due to hypertensive emergency; patient states unable to lift legs against gravity  S/p fall at home due to bilateral lower extremity weakness  CT head negative  Neurology consult placed

## 2023-12-12 NOTE — ED PROVIDER NOTES
Encounter Date: 12/11/2023       History     Chief Complaint   Patient presents with    Dizziness    Vomiting    Fall     For several days      58 y.o. male with ESRD on dialysis, DM, HLD, HTN presents for episodic dizziness and multiple episodes of vomiting.  Patient reports for the past several days he has been having emesis after urinating.  He denies any blood in his emesis.  He denies abdominal pain.  He missed dialysis and has not been taking his medications by mouth due to concern for vomiting.  He took Zofran this morning which helped with his nausea, however it returned.  He also reports episodic dizziness when he stands from a seated position.  He denies any persistent dizziness, weakness, speech difficulty, chest pain, shortness of breath, extremity edema    The history is provided by the patient and medical records.     Review of patient's allergies indicates:  No Known Allergies  Past Medical History:   Diagnosis Date    Diabetes mellitus, type 2     Diabetic foot ulcer associated with diabetes mellitus due to underlying condition 07/16/2020    ESRD on hemodialysis     Hyperlipidemia     Hypertension     Obese      Past Surgical History:   Procedure Laterality Date    AV FISTULA PLACEMENT Left 07/06/2023    Procedure: CREATION, AV FISTULA;  Surgeon: Daniel Poon MD;  Location: [facility] OR;  Service: Vascular;  Laterality: Left;  RN PREOP 6/28/2023  LABS DAY OF SURGERY    BONE BIOPSY Left 07/17/2020    Procedure: BIOPSY, BONE;  Surgeon: Verona Whitmore DPM;  Location: [facility] OR;  Service: Podiatry;  Laterality: Left;    CERVICAL DISC SURGERY  07/11/2016    DEBRIDEMENT Left 07/17/2020    Procedure: DEBRIDEMENT;  Surgeon: Verona Whitmore DPM;  Location: [facility] OR;  Service: Podiatry;  Laterality: Left;    DEBRIDEMENT OF FOOT Right     toes & plantar surface    ESOPHAGEAL MANOMETRY WITH MEASUREMENT OF IMPEDANCE N/A 03/27/2023    Procedure: MANOMETRY, ESOPHAGUS, WITH IMPEDANCE MEASUREMENT;  Surgeon:  Roopa Pérez MD;  Location: [facility] ENDO (4TH FLR);  Service: Endoscopy;  Laterality: N/A;  Belching and rumination protocol please  instructions via portal - sm    ESOPHAGOGASTRODUODENOSCOPY N/A 12/16/2022    Procedure: EGD (ESOPHAGOGASTRODUODENOSCOPY);  Surgeon: Eren Francois MD;  Location: [facility] ENDO;  Service: Endoscopy;  Laterality: N/A;  Pt. on Dialysis. K+ ordered prior to procedure.EC    ESOPHAGOGASTRODUODENOSCOPY N/A 12/5/2023    Procedure: EGD (ESOPHAGOGASTRODUODENOSCOPY);  Surgeon: Kash Johnston MD;  Location: [facility] ENDO;  Service: Endoscopy;  Laterality: N/A;    FRACTURE SURGERY Right     medial ankle, metal plate present    INSERTION OF TUNNELED CENTRAL VENOUS CATHETER (CVC) WITH SUBCUTANEOUS PORT N/A 06/11/2021    Procedure: TUNNEL CATH INSERTION WITHOUT PORT;  Surgeon: Dosnadeem Diagnostic Provider;  Location: [facility] OR;  Service: Radiology;  Laterality: N/A;  11AM START---PHONE PREOP 6/10/21---COVID POSTIVE ON 7/2020---NO S/S    left leg orthopedic surgery Left     UPPER GASTROINTESTINAL ENDOSCOPY       Family History   Adopted: Yes   Problem Relation Age of Onset    Heart disease Father     Colon cancer Neg Hx     Esophageal cancer Neg Hx     Colon polyps Neg Hx     Stomach cancer Neg Hx      Social History     Tobacco Use    Smoking status: Never    Smokeless tobacco: Never   Substance Use Topics    Alcohol use: Yes     Comment: occ rare beer    Drug use: No     Review of Systems   Reason unable to perform ROS: See HPI for relevant ROS.       Physical Exam     Initial Vitals [12/11/23 1440]   BP Pulse Resp Temp SpO2   (!) 208/97 87 20 98.3 °F (36.8 °C) 97 %      MAP       --         Physical Exam    Nursing note and vitals reviewed.  Constitutional:   Alert, speaking full sentences, no acute distress   Eyes: Conjunctivae are normal. No scleral icterus.   Cardiovascular:  Normal rate, regular rhythm and intact distal pulses.           Pulmonary/Chest: Breath sounds normal. No respiratory  distress.   Abdominal: Abdomen is soft. He exhibits no distension. There is no abdominal tenderness.   Musculoskeletal:         General: No tenderness or edema.     Neurological: He is alert and oriented to person, place, and time. He has normal strength. No cranial nerve deficit or sensory deficit.   Skin: Skin is warm and dry.         ED Course   Procedures  Labs Reviewed   CBC W/ AUTO DIFFERENTIAL - Abnormal; Notable for the following components:       Result Value    RBC 4.40 (*)     Hemoglobin 12.6 (*)     Hematocrit 38.8 (*)     RDW 15.6 (*)     MPV 8.7 (*)     Immature Granulocytes 0.7 (*)     Immature Grans (Abs) 0.05 (*)     Lymph # 0.8 (*)     Gran % 74.5 (*)     Lymph % 12.0 (*)     All other components within normal limits   COMPREHENSIVE METABOLIC PANEL - Abnormal; Notable for the following components:    Sodium 135 (*)     CO2 19 (*)     BUN 53 (*)     Creatinine 9.3 (*)     Total Protein 8.5 (*)     Albumin 3.0 (*)     Total Bilirubin 1.5 (*)     Alkaline Phosphatase 854 (*)     AST 64 (*)     eGFR 6 (*)     All other components within normal limits   URINALYSIS, REFLEX TO URINE CULTURE          Imaging Results              CT Head Without Contrast (In process)                      X-Ray Chest AP Portable (In process)                      Medications   ondansetron injection 4 mg (4 mg Intravenous Given 12/11/23 1941)   hydrALAZINE tablet 100 mg (100 mg Oral Given 12/11/23 1942)   NIFEdipine 24 hr tablet 90 mg (90 mg Oral Given 12/11/23 1943)   sodium chloride 0.9% bolus 500 mL 500 mL (0 mLs Intravenous Stopped 12/11/23 2041)   hydrALAZINE injection 20 mg (20 mg Intravenous Given 12/11/23 2055)   hydrALAZINE injection 20 mg (20 mg Intravenous Given 12/11/23 2152)     Medical Decision Making  58 y.o. male with ESRD on dialysis, DM, HLD, HTN presents for episodic dizziness and multiple episodes of vomiting.  Differentials include gastroparesis, gastroenteritis, metabolic derangement, hypertensive  emergency, vertigo  Patient overall well-appearing on arrival, no focal neurological deficits, normal coordination, no persistent dizziness, doubt central vertigo  Suspect patient is mildly dehydrated after multiple episodes of emesis, small bolus IV fluids were given  Patient is markedly hypertensive, suspect secondary to not taking his medications due to nausea earlier today.  No evidence of pulmonary edema, patient did missed dialysis Saturday.  EKG and blood work without evidence of hyperkalemia    Amount and/or Complexity of Data Reviewed  External Data Reviewed: labs, ECG and notes.  Labs:  Decision-making details documented in ED Course.  Radiology: ordered. Decision-making details documented in ED Course.  ECG/medicine tests:  Decision-making details documented in ED Course.    Risk  Prescription drug management.  Decision regarding hospitalization.               ED Course as of 12/11/23 2231   Mon Dec 11, 2023   1739 EKG 12-lead  Findings, per independent interpretation:  Sinus rhythm, regular, narrow complex, rate of 73, no STEMI, no ST deviations, no significant change compared to prior [OK]   1903 BUN(!): 53 [OK]   1903 Creatinine(!): 9.3 [OK]   1933 X-Ray Chest AP Portable  Findings, per independent interpretation:  Bilateral interstitial opacification, no focal consolidation, overall slightly improved compared to prior [OK]   2156 On re-evaluation, patient continues to be hypertensive, have generalized weakness.  Attempted to ambulate patient but he is unable to care he is on weight and continues to have weakness.  CT head ordered, will likely admit to hospital medicine pending CT head results [OK]      ED Course User Index  [OK] Cam Catalan MD                           Clinical Impression:  Final diagnoses:  [R42] Dizziness  [R42] Dizziness - ESRD, eval pulmonary edema  [I10] Severe hypertension (Primary)  [R53.1] Generalized weakness          ED Disposition Condition    Admit Stable                 Cam Catalan MD  12/11/23 4546

## 2023-12-12 NOTE — DISCHARGE INSTRUCTIONS
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 [S34.111A]  Unknown    Goals of care, counseling/discussion [Z71.89]  Not Applicable    Hypertensive emergency [I16.1]  Yes    Lower extremity weakness [R29.898]  Yes    Abnormal MRI, lumbar spine [R93.7]  Yes    Intractable hiccups [R06.6]  Yes    Serum total bilirubin elevated [R17]  Yes    ESRD on hemodialysis [N18.6, Z99.2]  Not Applicable    Type 2 diabetes mellitus with stage 5 chronic kidney disease [E11.22, N18.5]  Yes      Resolved Hospital Problems    Diagnosis Date Resolved POA    ESRD (end stage renal disease) [N18.6] 12/12/2023 Yes     Allergies: Review of patient's allergies indicates:  No Known Allergies    Code Status: Full    Vitals: Routine       Diet: renal diet and fluid restriction: 1.5 L    Activity: Activity as tolerated    Nursing Precautions: Fall    Bed/Surface: Low Air Loss    Consults: PT to evaluate and treat- 5 times a week and OT to evaluate and treat- 5 times a week    Oxygen: room air    Dialysis: Patient is on dialysis. T, Th, Sat    Labs:  Routine dialysis labs               BMP    Pending Diagnostic Studies:       None          Imaging: None    Miscellaneous Care:   Diabetes Care: Diabetes: Check blood sugar. Fingerstick blood sugar A.M and p.m.  Sliding Scale/Hypoglycemia Protocol: For FSG<80, give 1 amp D50 or 1 glucose tablet. For FSG , do nothing. For -200, give 1 unit of novolog in addition to pre-meal insulin. For -250, give 2 units of novolog in addition to pre-meal insulin. For -300, give 3 units of novolog in addition to pre-meal insulin. For 301-350, give 4 units of novolog in addition to pre-meal insulin. For 351-400, give 5 units of novolog in addition to pre-meal  insulin. For FSG >400, give 5 units of novolog in addition to pre-meal insulin and please call MD    IV Access: Dialysis Access     Medications: Discontinue all previous medication orders, if any. See new list below.  Current Discharge Medication List        CONTINUE these medications which have CHANGED    Details   aspirin 81 MG Chew Take 1 tablet (81 mg total) by mouth once daily.  Qty: 30 tablet, Refills: 0      ondansetron (ZOFRAN-ODT) 4 MG TbDL Take 2 tablets (8 mg total) by mouth every 8 (eight) hours as needed (nausea vomiting).  Qty: 30 tablet, Refills: 0           CONTINUE these medications which have NOT CHANGED    Details   hydrALAZINE (APRESOLINE) 100 MG tablet Take 1 tablet (100 mg total) by mouth 3 (three) times daily.  Qty: 90 tablet, Refills: 0    Comments: .      NIFEdipine (PROCARDIA XL) 90 MG (OSM) 24 hr tablet Take 1 tablet (90 mg total) by mouth once daily.  Qty: 90 tablet, Refills: 3    Comments: .      promethazine (PHENERGAN) 12.5 MG Tab Take 1 tablet (12.5 mg total) by mouth every 6 (six) hours as needed (nausea and vomiting).  Qty: 30 tablet, Refills: 3    Associated Diagnoses: Nausea and vomiting, unspecified vomiting type      amitriptyline (ELAVIL) 10 MG tablet Take 1 tablet (10 mg total) by mouth every evening.  Qty: 30 tablet, Refills: 0      methoxy peg-epoetin beta (MIRCERA INJ) Inject 50 mcg into the skin every 28 days.      sevelamer carbonate (RENVELA) 800 mg Tab Take 1 tablet (800 mg total) by mouth 3 (three) times daily with meals.  Qty: 90 tablet, Refills: 11    Associated Diagnoses: ESRD on hemodialysis      torsemide (DEMADEX) 20 MG Tab Take 1 tablet by mouth once daily.           STOP taking these medications       atorvastatin (LIPITOR) 40 MG tablet Comments:   Reason for Stopping:         baclofen (LIORESAL) 5 mg Tab tablet Comments:   Reason for Stopping:         bethanechol (URECHOLINE) 10 MG Tab Comments:   Reason for Stopping:         chlorproMAZINE (THORAZINE) 25 MG  tablet Comments:   Reason for Stopping:         gabapentin (NEURONTIN) 100 MG capsule Comments:   Reason for Stopping:         GAVILYTE-C 240-22.72-6.72 -5.84 gram SolR Comments:   Reason for Stopping:         heparin sod,pork in 0.45% NaCl (HEPARIN, PORCINE, 100 UNITS) 100 unit/100 mL (1 unit/mL) SolP in sodium chloride 0.45 % 100 mL infusion Comments:   Reason for Stopping:         pantoprazole (PROTONIX) 40 MG tablet Comments:   Reason for Stopping:         sucroferric oxyhydroxide (VELPHORO) 500 mg Chew Comments:   Reason for Stopping:             Follow up:    Follow-up Information       Nunu Hill MD Follow up.    Specialties: Hematology and Oncology, Hematology, Oncology  Why: 12/20 @ 10:20 am  Contact information:  [address]  [place] [address]  [phone]               Marty Rodríguez PA-C Follow up in 1 week(s).    Specialty: Family Medicine  Contact information:  [address] [address] [phone]                               Immunizations Administered as of 12/18/2023       Name Date Dose VIS Date Route Exp Date    COVID-19, MRNA, LN-S, PF (Pfizer) (Purple Cap) 4/23/2021 -- -- -- --    COVID-19, MRNA, LN-S, PF (Pfizer) (Purple Cap) 4/2/2021 -- -- -- --                Leticia Isabel NP

## 2023-12-12 NOTE — PLAN OF CARE
The pt is cleared for discharge home with [facility]  services and has follow up appts added to his AVS.    12/12/23 1421   Final Note   Assessment Type Final Discharge Note   Anticipated Discharge Disposition Home-Health   What phone number can be called within the next 1-3 days to see how you are doing after discharge? [phone]   Hospital Resources/Appts/Education Provided Appointments scheduled and added to AVS   Post-Acute Status   Post-Acute Authorization Home Health   Home Health Status Set-up Complete/Auth obtained   Patient choice form signed by patient/caregiver List with quality metrics by geographic area provided   Discharge Delays None known at this time

## 2023-12-12 NOTE — ASSESSMENT & PLAN NOTE
Patient has a current diagnosis of Hypertensive emergency with end organ damage evidenced by acute kidney injury which is uncontrolled.  Latest blood pressure and vitals reviewed-   Temp:  [97.3 °F (36.3 °C)-98.4 °F (36.9 °C)]   Pulse:  [66-92]   Resp:  [16-18]   BP: (135-262)/()   SpO2:  [93 %-100 %] .   Patient currently on IV antihypertensives.   Home meds for hypertension were reviewed and noted below.   Hypertension Medications               hydrALAZINE (APRESOLINE) 100 MG tablet Take 1 tablet (100 mg total) by mouth 3 (three) times daily.    NIFEdipine (PROCARDIA XL) 90 MG (OSM) 24 hr tablet Take 1 tablet (90 mg total) by mouth once daily.    torsemide (DEMADEX) 20 MG Tab Take 1 tablet by mouth once daily.              Will aim for controlled BP reduction by medications noted above. Monitor and mitigate end organ damage as indicated.  BP improved with re-introducing home meds  Follow BP trends and adjust as warranted

## 2023-12-12 NOTE — HOSPITAL COURSE
"Patient presented with hypertensive emergency not taking medication for blood pressure.  He reports nausea which has resolved in early morning he was able to tolerate breakfast.  Patient had also reported a fall at home a few days prior to presentation.  MRI lumbar spine obtained that showed was concerning for metastatic disease.  Nephrology followed during hospitalization with dialysis routine as scheduled TTHS.  Patient was evaluated by Neurology and neurosurgery.  Recommendations were made for EMG/nerve conduction study after discharge. Oncology was consulted.  Oncology workup was started.  Patient is to follow-up outpatient for biopsy of spinal lesions, however noted that, "Skeletal survey did not show any lytic lesions. He will follow up in clinic as he will likely need a biopsy of spinal lesions as outpatient - awaiting SPEP" - Palliative care consult code status remains full code. He did have hyperkalemia and was treated with dialysis.  PT and OT were consulted.  Case management was consulted for discharge planning and rehab placement.  He was cleared from all perspectives for discharge to rehab.  "

## 2023-12-12 NOTE — ASSESSMENT & PLAN NOTE
Concerning MRI spine for metas tic disease  Consult to Onclology  Neuro consulted and following paraesthesias  PT/OT

## 2023-12-12 NOTE — PLAN OF CARE
[facility] - Med/Surg  Initial Discharge Assessment       Primary Care Provider: Lizbeth, Primary Doctor    Admission Diagnosis: Severe hypertension [I10]    Admission Date: 12/11/2023  Expected Discharge Date: 12/12/2023    Transition of Care Barriers: None    Spoke to pt at bedside to complete dc assessment. Verified facesheet. Reports he lives with a friend. [facility]. No DME. Denies hh/dm/coumadin. Pt goes to  [facility] and drives self. Pt with recent admit. friend to provide transport home. CM to follow for dc needs      Payor: BLUE CROSS BLUE SHIELD / Plan: BLUE CONNECT / Product Type: HMO /     Extended Emergency Contact Information  Primary Emergency Contact: Víctor Mcfadden  Mobile Phone: [phone]  Relation: Sister   needed? No    Discharge Plan A: Home  Discharge Plan B: Home      [id number] - [place] - [address] AT [facility]  [address] [place], [place] [address]  [place] [address]  Phone: [phone] Fax: [phone]    [id number] - [place] - [address] [facility]  [address] [facility] [address]  Phone: [phone] Fax: [phone]    [facility] [address] [address]  Phone: [phone] Fax: [phone]    [id number] [place] [address]  [address] AT [facility] [address]  [address] [address]  Phone: [phone] Fax: [phone]      Initial Assessment (most recent)       Adult Discharge Assessment - 12/12/23 0946          Discharge Assessment    Assessment Type Discharge Planning Assessment     Confirmed/corrected address, phone number and insurance Yes     Confirmed Demographics Correct on Facesheet     Source of Information patient     People in Home friend(s)     Do you expect to return to your current  living situation? Yes     Prior to hospitilization cognitive status: Unable to Assess     Current cognitive status: Alert/Oriented     Walking or Climbing Stairs Difficulty no     Dressing/Bathing Difficulty no     Equipment Currently Used at Home none     Readmission within 30 days? Yes     Patient currently being followed by outpatient case management? No     Do you currently have service(s) that help you manage your care at home? No     Do you take prescription medications? Yes     Do you have prescription coverage? Yes     Do you have any problems affording any of your prescribed medications? No     Is the patient taking medications as prescribed? yes     How do you get to doctors appointments? car, drives self;family or friend will provide     Are you on dialysis? Yes     Dialysis Name and Scheduled days [facility]     Do you take coumadin? No     Discharge Plan A Home     Discharge Plan B Home     DME Needed Upon Discharge  none     Discharge Plan discussed with: Patient     Transition of Care Barriers None

## 2023-12-12 NOTE — PHARMACY MED REC
"              .        Admission Medication History     The home medication history was taken by Corinne Burnett.    You may go to "Admission" then "Reconcile Home Medications" tabs to review and/or act upon these items.     The home medication list has been updated by the Pharmacy department.   Please read ALL comments highlighted in yellow.   Please address this information as you see fit.    Feel free to contact us if you have any questions or require assistance.        Medications listed below were obtained from: Patient/family and Analytic software- [employer]  No current facility-administered medications on file prior to encounter.     Current Outpatient Medications on File Prior to Encounter   Medication Sig Dispense Refill    hydrALAZINE (APRESOLINE) 100 MG tablet Take 1 tablet (100 mg total) by mouth 3 (three) times daily. 90 tablet 0    NIFEdipine (PROCARDIA XL) 90 MG (OSM) 24 hr tablet Take 1 tablet (90 mg total) by mouth once daily. 90 tablet 3    ondansetron (ZOFRAN-ODT) 4 MG TbDL Take 1 tablet (4 mg total) by mouth every 6 (six) hours as needed (nausea and vomiting). 30 tablet 3    promethazine (PHENERGAN) 12.5 MG Tab Take 1 tablet (12.5 mg total) by mouth every 6 (six) hours as needed (nausea and vomiting). 30 tablet 3    amitriptyline (ELAVIL) 10 MG tablet Take 1 tablet (10 mg total) by mouth every evening. (Patient not taking: Reported on 12/12/2023) 30 tablet 0    aspirin 81 MG Chew Take 1 tablet (81 mg total) by mouth once daily. (Patient not taking: Reported on 12/12/2023) 30 tablet 0    atorvastatin (LIPITOR) 40 MG tablet Take 1 tablet (40 mg total) by mouth once daily. (Patient not taking: Reported on 12/12/2023) 30 tablet 0    baclofen (LIORESAL) 5 mg Tab tablet Take 1 tablet (5 mg total) by mouth 3 (three) times daily. (Patient not taking: Reported on 12/12/2023) 90 tablet 0    bethanechol (URECHOLINE) 10 MG Tab Take 1 tablet by mouth 3 (three) times daily.      chlorproMAZINE (THORAZINE) 25 MG " tablet Take 1 tablet (25 mg total) by mouth 3 (three) times daily as needed (hiccups). (Patient not taking: Reported on 12/12/2023) 90 tablet 0    gabapentin (NEURONTIN) 100 MG capsule Take 1 capsule (100 mg total) by mouth 3 (three) times daily. (Patient not taking: Reported on 12/12/2023) 90 capsule 11    GAVILYTE-C 240-22.72-6.72 -5.84 gram SolR Take 4,000 mLs by mouth once.      heparin sod,pork in 0.45% NaCl (HEPARIN, PORCINE, 100 UNITS) 100 unit/100 mL (1 unit/mL) SolP in sodium chloride 0.45 % 100 mL infusion Inject 1 mL/hr into the vein continuous.      methoxy peg-epoetin beta (MIRCERA INJ) Inject 50 mcg into the skin every 28 days.      pantoprazole (PROTONIX) 40 MG tablet Take 1 tablet (40 mg total) by mouth once daily. (Patient not taking: Reported on 12/12/2023) 90 tablet 3    sevelamer carbonate (RENVELA) 800 mg Tab Take 1 tablet (800 mg total) by mouth 3 (three) times daily with meals. (Patient not taking: Reported on 12/12/2023) 90 tablet 11    sucroferric oxyhydroxide (VELPHORO) 500 mg Chew Take 1 tablet by mouth once daily.      torsemide (DEMADEX) 20 MG Tab Take 1 tablet by mouth once daily.         Potential issues to be addressed PRIOR TO DISCHARGE  Patient reported not taking the following medications: (Torsemide, Velphoro, Renvela, Pantoprazole, Gabapentin, Thorazine, Bethanechol, Baclofen, Atorvastatin, Aspirin & Amitriptyline). These medications remain on the home medication list. Please address accordingly.     Corinne Burnett  EXT 1924

## 2023-12-12 NOTE — SUBJECTIVE & OBJECTIVE
Past Medical History:   Diagnosis Date    Diabetes mellitus, type 2     Diabetic foot ulcer associated with diabetes mellitus due to underlying condition 07/16/2020    ESRD on hemodialysis     Hyperlipidemia     Hypertension     Obese        Past Surgical History:   Procedure Laterality Date    AV FISTULA PLACEMENT Left 07/06/2023    Procedure: CREATION, AV FISTULA;  Surgeon: Daniel Poon MD;  Location: [facility];  Service: Vascular;  Laterality: Left;  RN PREOP 6/28/2023  LABS DAY OF SURGERY    BONE BIOPSY Left 07/17/2020    Procedure: BIOPSY, BONE;  Surgeon: Verona Whitmore DPM;  Location: [facility];  Service: Podiatry;  Laterality: Left;    CERVICAL DISC SURGERY  07/11/2016    DEBRIDEMENT Left 07/17/2020    Procedure: DEBRIDEMENT;  Surgeon: Verona Whitmore DPM;  Location: [facility] OR;  Service: Podiatry;  Laterality: Left;    DEBRIDEMENT OF FOOT Right     toes & plantar surface    ESOPHAGEAL MANOMETRY WITH MEASUREMENT OF IMPEDANCE N/A 03/27/2023    Procedure: MANOMETRY, ESOPHAGUS, WITH IMPEDANCE MEASUREMENT;  Surgeon: Roopa Pérez MD;  [facility]  Service: Endoscopy;  Laterality: N/A;  Belching and rumination protocol please  instructions via portal -     ESOPHAGOGASTRODUODENOSCOPY N/A 12/16/2022    Procedure: EGD (ESOPHAGOGASTRODUODENOSCOPY);  Surgeon: Eren Francois MD;  Location: [facility];  Service: Endoscopy;  Laterality: N/A;  Pt. on Dialysis. K+ ordered prior to procedure.EC    ESOPHAGOGASTRODUODENOSCOPY N/A 12/5/2023    Procedure: EGD (ESOPHAGOGASTRODUODENOSCOPY);  Surgeon: Kash Johnston MD;  Location[facility];  Service: Endoscopy;  Laterality: N/A;    FRACTURE SURGERY Right     medial ankle, metal plate present    INSERTION OF TUNNELED CENTRAL VENOUS CATHETER (CVC) WITH SUBCUTANEOUS PORT N/A 06/11/2021    Procedure: TUNNEL CATH INSERTION WITHOUT PORT;  Surgeon: Jose Manuel Diagnostic Provider;  Location: [facility];  Service: Radiology;  Laterality: N/A;  11AM START---PHONE  PREOP 6/10/21---COVID POSTIVE ON 7/2020---NO S/S    left leg orthopedic surgery Left     UPPER GASTROINTESTINAL ENDOSCOPY         Review of patient's allergies indicates:  No Known Allergies    No current facility-administered medications on file prior to encounter.     Current Outpatient Medications on File Prior to Encounter   Medication Sig    hydrALAZINE (APRESOLINE) 100 MG tablet Take 1 tablet (100 mg total) by mouth 3 (three) times daily.    NIFEdipine (PROCARDIA XL) 90 MG (OSM) 24 hr tablet Take 1 tablet (90 mg total) by mouth once daily.    ondansetron (ZOFRAN-ODT) 4 MG TbDL Take 1 tablet (4 mg total) by mouth every 6 (six) hours as needed (nausea and vomiting).    promethazine (PHENERGAN) 12.5 MG Tab Take 1 tablet (12.5 mg total) by mouth every 6 (six) hours as needed (nausea and vomiting).    amitriptyline (ELAVIL) 10 MG tablet Take 1 tablet (10 mg total) by mouth every evening. (Patient not taking: Reported on 12/12/2023)    aspirin 81 MG Chew Take 1 tablet (81 mg total) by mouth once daily. (Patient not taking: Reported on 12/12/2023)    atorvastatin (LIPITOR) 40 MG tablet Take 1 tablet (40 mg total) by mouth once daily. (Patient not taking: Reported on 12/12/2023)    baclofen (LIORESAL) 5 mg Tab tablet Take 1 tablet (5 mg total) by mouth 3 (three) times daily. (Patient not taking: Reported on 12/12/2023)    bethanechol (URECHOLINE) 10 MG Tab Take 1 tablet by mouth 3 (three) times daily.    chlorproMAZINE (THORAZINE) 25 MG tablet Take 1 tablet (25 mg total) by mouth 3 (three) times daily as needed (hiccups). (Patient not taking: Reported on 12/12/2023)    gabapentin (NEURONTIN) 100 MG capsule Take 1 capsule (100 mg total) by mouth 3 (three) times daily. (Patient not taking: Reported on 12/12/2023)    GAVILYTE-C 240-22.72-6.72 -5.84 gram SolR Take 4,000 mLs by mouth once.    heparin sod,pork in 0.45% NaCl (HEPARIN, PORCINE, 100 UNITS) 100 unit/100 mL (1 unit/mL) SolP in sodium chloride 0.45 % 100 mL  infusion Inject 1 mL/hr into the vein continuous.    methoxy peg-epoetin beta (MIRCERA INJ) Inject 50 mcg into the skin every 28 days.    pantoprazole (PROTONIX) 40 MG tablet Take 1 tablet (40 mg total) by mouth once daily. (Patient not taking: Reported on 12/12/2023)    sevelamer carbonate (RENVELA) 800 mg Tab Take 1 tablet (800 mg total) by mouth 3 (three) times daily with meals. (Patient not taking: Reported on 12/12/2023)    sucroferric oxyhydroxide (VELPHORO) 500 mg Chew Take 1 tablet by mouth once daily.    torsemide (DEMADEX) 20 MG Tab Take 1 tablet by mouth once daily.     Family History       Problem Relation (Age of Onset)    Heart disease Father          Tobacco Use    Smoking status: Never    Smokeless tobacco: Never   Substance and Sexual Activity    Alcohol use: Not Currently     Comment: occ rare beer    Drug use: No    Sexual activity: Not Currently     Review of Systems   Constitutional:  Positive for activity change and fatigue. Negative for appetite change, chills, diaphoresis and fever.   HENT: Negative.     Eyes: Negative.    Respiratory:  Negative for cough, shortness of breath and wheezing.    Cardiovascular:  Negative for chest pain and leg swelling.   Gastrointestinal:  Negative for abdominal distention, abdominal pain, constipation, diarrhea, nausea and vomiting.   Endocrine: Negative.    Genitourinary:  Negative for difficulty urinating and dysuria.   Musculoskeletal:  Negative for arthralgias, back pain and myalgias.   Neurological:  Positive for weakness (bilateral lower extremity). Negative for syncope, facial asymmetry, speech difficulty, light-headedness, numbness and headaches.   Hematological: Negative.    Psychiatric/Behavioral: Negative.       Objective:     Vital Signs (Most Recent):  Temp: 98.4 °F (36.9 °C) (12/12/23 0146)  Pulse: 69 (12/12/23 0146)  Resp: 18 (12/12/23 0146)  BP: (!) 206/93 (12/12/23 0146)  SpO2: 97 % (12/12/23 0146) Vital Signs (24h Range):  Temp:  [98.3 °F  (36.8 °C)-98.4 °F (36.9 °C)] 98.4 °F (36.9 °C)  Pulse:  [66-92] 69  Resp:  [16-20] 18  SpO2:  [93 %-99 %] 97 %  BP: (189-262)/() 206/93     Weight: 91.1 kg (200 lb 13.4 oz)  Body mass index is 26.5 kg/m².     Physical Exam  Vitals and nursing note reviewed.   Constitutional:       General: He is not in acute distress.     Appearance: Normal appearance. He is not toxic-appearing.   Cardiovascular:      Rate and Rhythm: Normal rate and regular rhythm.      Heart sounds: No murmur heard.     No gallop.   Pulmonary:      Effort: Pulmonary effort is normal. No respiratory distress.      Breath sounds: Normal breath sounds. No wheezing.   Abdominal:      General: Bowel sounds are normal. There is no distension.      Palpations: Abdomen is soft.      Tenderness: There is no abdominal tenderness.   Skin:     General: Skin is warm and dry.      Capillary Refill: Capillary refill takes less than 2 seconds.      Comments: Left upper arm AV Fistula noted with positive bruit and thrill   Neurological:      Mental Status: He is alert and oriented to person, place, and time. Mental status is at baseline.      GCS: GCS eye subscore is 4. GCS verbal subscore is 5. GCS motor subscore is 6.      Sensory: Sensation is intact.      Motor: Weakness (bilateral lower extremity) present.      Comments: Reports unable to lift legs against gravity due to weakness   Psychiatric:         Mood and Affect: Mood normal.                Significant Labs: All pertinent labs within the past 24 hours have been reviewed.  BMP:   Recent Labs   Lab 12/11/23  1728      *   K 4.7      CO2 19*   BUN 53*   CREATININE 9.3*   CALCIUM 10.2     CBC:   Recent Labs   Lab 12/11/23  1728   WBC 6.86   HGB 12.6*   HCT 38.8*        CMP:   Recent Labs   Lab 12/11/23  1728   *   K 4.7      CO2 19*      BUN 53*   CREATININE 9.3*   CALCIUM 10.2   PROT 8.5*   ALBUMIN 3.0*   BILITOT 1.5*   ALKPHOS 854*   AST 64*   ALT 30    ANIONGAP 15       Significant Imaging: I have reviewed all pertinent imaging results/findings within the past 24 hours.    PROCEDURE INFORMATION:   Exam: CT Head Without Contrast   Exam date and time: 12/11/2023 10:19 PM Age: 58 years old Clinical indication: Dizziness TECHNIQUE: Imaging protocol: Computed tomography of the head without contrast. Radiation optimization: All CT scans at this facility use at least one of these dose optimization techniques: automated exposure control; mA and/or kV adjustment per patient size (includes targeted exams where dose is matched to clinical indication); or iterative reconstruction. COMPARISON: CT HEAD WITHOUT CONTRAST 2/22/2023 3:26 AM FINDINGS: Brain: Normal. No hemorrhage. Unremarkable white matter. No mass effect. Cerebral ventricles: No ventriculomegaly. Paranasal sinuses: Visualized sinuses are unremarkable. No fluid levels. Mastoid air cells: Visualized mastoid air cells are well aerated. Bones/joints: Unremarkable. No acute fracture. Soft tissues: Unremarkable. IMPRESSION: No acute intracranial abnormality. Dictated and Authenticated by: Hieu Garduno MD 12/11/2023 11:01 PM Central Time ([place] & [place])

## 2023-12-12 NOTE — PT/OT/SLP EVAL
Occupational Therapy   Evaluation    Name: Catalino Mcfadden  MRN: [id number]  Admitting Diagnosis: Hypertensive emergency  Recent Surgery: * No surgery found *      Recommendations:     Discharge Recommendations: High Intensity Therapy  Discharge Equipment Recommendations:  other (see comments) (TBD)  Barriers to discharge:       Assessment:     Catalino Mcfadden is a 58 y.o. male with a medical diagnosis of Hypertensive emergency.  He presents with the following erformance deficits affecting function: weakness, impaired endurance, impaired sensation, impaired self care skills, impaired functional mobility, gait instability, impaired balance, decreased upper extremity function, decreased lower extremity function, impaired coordination, impaired fine motor, decreased ROM.      Rehab Prognosis: Good; patient would benefit from acute skilled OT services to address these deficits and reach maximum level of function.       Plan:     Patient to be seen 5 x/week to address the above listed problems via self-care/home management, therapeutic activities, neuromuscular re-education, therapeutic exercises  Plan of Care Expires: 01/09/24  Plan of Care Reviewed with: patient    Subjective     Chief Complaint: Impaired coordination BUE's  Patient/Family Comments/goals: To find out what is wrong with him and get better    Occupational Profile:  Living Environment: Pt lives with friend in 1 story home with threshold [place]. Pt has tub/shower and standard toilet.   Previous level of function: Pt reports ADL's becoming more difficult including sit to stand from standard toilet and use of RUE/L hand  Roles and Routines: Friend  Equipment Used at Home: none  Assistance upon Discharge: TBD    Pain/Comfort:  Pain Rating 1: 0/10  Pain Rating Post-Intervention 1: 0/10    Patients cultural, spiritual, Adventism conflicts given the current situation:      Objective:     Communicated with: Nurse Starks prior to session.  Patient found HOB elevated with bed  alarm, peripheral IV, telemetry upon OT entry to room.    General Precautions: Standard, fall  Orthopedic Precautions: N/A  Braces: N/A  Respiratory Status: Room air    Occupational Performance:      Activities of Daily Living:  Feeding:  stand by assistance set up  Grooming: contact guard assistance    Bathing: minimum assistance    Upper Body Dressing: minimum assistance    Lower Body Dressing: moderate assistance    Toileting: moderate assistance      Cognitive/Visual Perceptual:  Pt alert and follow ed commands for OT Evaluation. Pt had difficulty with getting his thoughts and words together to describe to OT the progression of his impairment, like losing his train of thought very frequently during conversation.  Pt very concerned about possibly being sent home and not knowing what is going on with him.  Physical Exam:  Upper Extremity Strength:    -       Right Upper Extremity: Pt R hand dominant and demonstrates slow AROM RUE in incremental movements. Pt reports numbness RUE. Pt demonstrates impaired coordination when reaching for emesis bag upon OT arrival with RUE.  -       Left Upper Extremity: Pt demonstrates AROM/strength LUE WFL's. Pt reports L hand tingling.    AMPAC 6 Click ADL:  AMPAC Total Score: 18    Treatment & Education:  OT provided education in role of OT. Pt verbalized understanding and was agreeable to OT.    Patient left HOB elevated with all lines intact, call button in reach, bed alarm on, and Nurse Tereza notified    GOALS:   Multidisciplinary Problems       Occupational Therapy Goals          Problem: Occupational Therapy    Goal Priority Disciplines Outcome Interventions   Occupational Therapy Goal     OT, PT/OT     Description: Goals to be met by: 1/9/24     Patient will increase functional independence with ADLs by performing:    Feeding with Modified [place].  UE Dressing with Modified [place].  LE Dressing with Modified [place].  Grooming while standing at sink with  Modified [place].  Toileting from raised toilet with Modified [place] for hygiene and clothing management.   Bathing from  shower chair/bench with Modified [place].  Toilet transfer to raised toilet with Modified [place].  Increased strength, coordination, and functional activity tolerance for ADL's/IADL's..                         History:     Past Medical History:   Diagnosis Date    Diabetes mellitus, type 2     Diabetic foot ulcer associated with diabetes mellitus due to underlying condition 07/16/2020    ESRD on hemodialysis     Hyperlipidemia     Hypertension     Obese          Past Surgical History:   Procedure Laterality Date    AV FISTULA PLACEMENT Left 07/06/2023    Procedure: CREATION, AV FISTULA;  Surgeon: Daniel Poon MD;  Location: [facility];  Service: Vascular;  Laterality: Left;  RN PREOP 6/28/2023  LABS DAY OF SURGERY    BONE BIOPSY Left 07/17/2020    Procedure: BIOPSY, BONE;  Surgeon: Verona Whitmore DPM;  Location: [facility];  Service: Podiatry;  Laterality: Left;    CERVICAL DISC SURGERY  07/11/2016    DEBRIDEMENT Left 07/17/2020    Procedure: DEBRIDEMENT;  Surgeon: Verona Whitmore DPM;  Location: [facility] OR;  Service: Podiatry;  Laterality: Left;    DEBRIDEMENT OF FOOT Right     toes & plantar surface    ESOPHAGEAL MANOMETRY WITH MEASUREMENT OF IMPEDANCE N/A 03/27/2023    Procedure: MANOMETRY, ESOPHAGUS, WITH IMPEDANCE MEASUREMENT;  Surgeon: Roopa Pérez MD;  Location: [facility] ([address] [place] [address]);  Service: Endoscopy;  Laterality: N/A;  Belching and rumination protocol please  instructions via portal - sm    ESOPHAGOGASTRODUODENOSCOPY N/A 12/16/2022    Procedure: EGD (ESOPHAGOGASTRODUODENOSCOPY);  Surgeon: Eren Francois MD;  Location: [facility];  Service: Endoscopy;  Laterality: N/A;  Pt. on Dialysis. K+ ordered prior to procedure.EC    ESOPHAGOGASTRODUODENOSCOPY N/A 12/5/2023    Procedure: EGD (ESOPHAGOGASTRODUODENOSCOPY);  Surgeon: Kash Johnston MD;  Location:  [facility];  Service: Endoscopy;  Laterality: N/A;    FRACTURE SURGERY Right     medial ankle, metal plate present    INSERTION OF TUNNELED CENTRAL VENOUS CATHETER (CVC) WITH SUBCUTANEOUS PORT N/A 06/11/2021    Procedure: TUNNEL CATH INSERTION WITHOUT PORT;  Surgeon: Jose Manuel Diagnostic Provider;  Location: [facility];  Service: Radiology;  Laterality: N/A;  11AM START---PHONE PREOP 6/10/21---COVID POSTIVE ON 7/2020---NO S/S    left leg orthopedic surgery Left     UPPER GASTROINTESTINAL ENDOSCOPY         Time Tracking:     OT Date of Treatment: 12/12/23  OT Start Time: 0946  OT Stop Time: 1020  OT Total Time (min): 34 min    Billable Minutes:Evaluation 8  Self Care/Home Management 26    12/12/2023

## 2023-12-12 NOTE — ASSESSMENT & PLAN NOTE
Creatine stable for now. BMP reviewed- noted Estimated Creatinine Clearance: 10.7 mL/min (A) (based on SCr of 9.3 mg/dL (H)). according to latest data. Based on current GFR, CKD stage is end stage.  Monitor UOP and serial BMP and adjust therapy as needed. Renally dose meds. Avoid nephrotoxic medications and procedures.  -AV fistula noted without complications

## 2023-12-12 NOTE — CONSULTS
INPATIENT NEPHROLOGY CONSULT   [facility]    Patient Name: Catalino Mcfadden  Date: 12/12/2023    Reason for consultation: ESRD    Chief Complaint:   Chief Complaint   Patient presents with    Dizziness    Vomiting    Fall     For several days        History of Present Illness:  Catalino Mcfadden is a 57 y/o M with past medical history significant for HTN, ESRD on HD, DM2, gastroparesis, diabetic foot ulcer, chronic hiccups, and HLD who presented to the ED for with episodic dizziness, vomiting, and lower extremity weakness following a fall x 1 day. He reports nausea has been resolved with Zofran. He denies chest pain, shortness of breath, fever, chills, abdominal pain, headaches, light-headedness, numbness, tingling, or LOC. He reports he missed his Saturday hemodialysis treatment but states last week he received HD Tues, Wed, Thurs, during hospitaliztion. He was recently discharged 12/07. In the ED blood pressure 200s/100s, received IV and PO antihypertensive medication with mild improvemnt. He states he did not take his medication yesterday due to N/V. CT Head negative. MRI brain neg. Consulted for dialysis.    Interval History:  12/12- BP better, on RA, no complaints    Plan of Care:    Assessment:  ESRD on HD TTS  HTN emergency  Hyponatremia  SHPT  Anemia of CKD    Plan:    - HD today  - resume home BP meds  - UF 3-4L  - renal diet, 1.5L fluid restriction  - resume binder with meals  - no acute CLEMENT needs    Thank you for allowing us to participate in this patient's care. We will continue to follow.    Vital Signs:  Temp Readings from Last 3 Encounters:   12/12/23 97.3 °F (36.3 °C)   12/07/23 98.3 °F (36.8 °C)   07/06/23 98.4 °F (36.9 °C) (Oral)       Pulse Readings from Last 3 Encounters:   12/12/23 72   12/07/23 99   12/01/23 95       BP Readings from Last 3 Encounters:   12/12/23 (!) 157/71   12/07/23 (!) 185/89   12/01/23 (!) 153/76       Weight:  Wt Readings from Last 3 Encounters:   12/12/23 91.1 kg  (200 lb 13.4 oz)   12/05/23 102.1 kg (225 lb)   12/01/23 96.1 kg (211 lb 13.8 oz)       Past Medical & Surgical History:  Past Medical History:   Diagnosis Date    Diabetes mellitus, type 2     Diabetic foot ulcer associated with diabetes mellitus due to underlying condition 07/16/2020    ESRD on hemodialysis     Hyperlipidemia     Hypertension     Obese        Past Surgical History:   Procedure Laterality Date    AV FISTULA PLACEMENT Left 07/06/2023    Procedure: CREATION, AV FISTULA;  Surgeon: Daniel Poon MD;  Location: [facility] OR;  Service: Vascular;  Laterality: Left;  RN PREOP 6/28/2023  LABS DAY OF SURGERY    BONE BIOPSY Left 07/17/2020    Procedure: BIOPSY, BONE;  Surgeon: Verona Whitmore DPM;  Location: [facility] OR;  Service: Podiatry;  Laterality: Left;    CERVICAL DISC SURGERY  07/11/2016    DEBRIDEMENT Left 07/17/2020    Procedure: DEBRIDEMENT;  Surgeon: Verona Whitmore DPM;  Location: [facility] OR;  Service: Podiatry;  Laterality: Left;    DEBRIDEMENT OF FOOT Right     toes & plantar surface    ESOPHAGEAL MANOMETRY WITH MEASUREMENT OF IMPEDANCE N/A 03/27/2023    Procedure: MANOMETRY, ESOPHAGUS, WITH IMPEDANCE MEASUREMENT;  Surgeon: Roopa Pérez MD;  Location: [facility] (4TH FLR);  Service: Endoscopy;  Laterality: N/A;  Belching and rumination protocol please  instructions via portal - sm    ESOPHAGOGASTRODUODENOSCOPY N/A 12/16/2022    Procedure: EGD (ESOPHAGOGASTRODUODENOSCOPY);  Surgeon: Eren Francois MD;  Location: [facility];  Service: Endoscopy;  Laterality: N/A;  Pt. on Dialysis. K+ ordered prior to procedure.EC    ESOPHAGOGASTRODUODENOSCOPY N/A 12/5/2023    Procedure: EGD (ESOPHAGOGASTRODUODENOSCOPY);  Surgeon: Kash Johnston MD;  Location: [facility];  Service: Endoscopy;  Laterality: N/A;    FRACTURE SURGERY Right     medial ankle, metal plate present    INSERTION OF TUNNELED CENTRAL VENOUS CATHETER (CVC) WITH SUBCUTANEOUS PORT N/A 06/11/2021    Procedure: TUNNEL CATH INSERTION  WITHOUT PORT;  Surgeon: Dosc Diagnostic Provider;  Location: [facility];  Service: Radiology;  Laterality: N/A;  11AM START---PHONE PREOP 6/10/21---COVID POSTIVE ON 7/2020---NO S/S    left leg orthopedic surgery Left     UPPER GASTROINTESTINAL ENDOSCOPY         Past Social History:  Social History     Socioeconomic History    Marital status: Other    Number of children: 1   Tobacco Use    Smoking status: Never    Smokeless tobacco: Never   Substance and Sexual Activity    Alcohol use: Not Currently     Comment: occ rare beer    Drug use: No    Sexual activity: Not Currently   Social History Narrative    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     Social Determinants of Health     Financial Resource Strain: Low Risk  (9/26/2022)    Overall Financial Resource Strain (CARDIA)     Difficulty of Paying Living Expenses: Not very hard   Food Insecurity: No Food Insecurity (9/26/2022)    Hunger Vital Sign     Worried About Running Out of Food in the Last Year: Never true     Ran Out of Food in the Last Year: Never true   Transportation Needs: No Transportation Needs (9/26/2022)    PRAPARE - Transportation     Lack of Transportation (Medical): No     Lack of Transportation (Non-Medical): No   Physical Activity: Unknown (9/26/2022)    Exercise Vital Sign     Days of Exercise per Week: Patient refused     Minutes of Exercise per Session: Patient refused   Stress: Stress Concern Present (9/26/2022)    [place] [employer] - Occupational Stress Questionnaire     Feeling of Stress : To some extent   Social Connections: Moderately Integrated (9/26/2022)    Social Connection and Isolation Panel [NHANES]     Frequency of Communication with Friends and Family: Three times a week     Frequency of Social Gatherings with Friends and Family: Three times a week     Attends Sikh Services: 1 to 4 times per year     Active Member of Clubs or Organizations: No     Attends Club or Organization Meetings: Never     Marital Status:     Housing Stability: Low Risk  (9/26/2022)    Housing Stability Vital Sign     Unable to Pay for Housing in the Last Year: No     Number of Places Lived in the Last Year: 1     Unstable Housing in the Last Year: No       Medications:  Scheduled Meds:   sodium chloride 0.9%   Intravenous Once    amitriptyline  10 mg Oral QHS    heparin (porcine)  5,000 Units Subcutaneous Q8H    hydrALAZINE  100 mg Oral TID    NIFEdipine  90 mg Oral Daily    pantoprazole  40 mg Oral Daily    sodium chloride 0.9%  10 mL Intravenous Q8H     Continuous Infusions:  PRN Meds:.sodium chloride 0.9%, acetaminophen, albuterol-ipratropium, aluminum-magnesium hydroxide-simethicone, chlorproMAZINE, glucagon (human recombinant), glucose, glucose, heparin (porcine), insulin aspart U-100, melatonin, naloxone, ondansetron, prochlorperazine, senna-docusate 8.6-50 mg, sodium chloride 0.9%  No current facility-administered medications on file prior to encounter.     Current Outpatient Medications on File Prior to Encounter   Medication Sig Dispense Refill    hydrALAZINE (APRESOLINE) 100 MG tablet Take 1 tablet (100 mg total) by mouth 3 (three) times daily. 90 tablet 0    NIFEdipine (PROCARDIA XL) 90 MG (OSM) 24 hr tablet Take 1 tablet (90 mg total) by mouth once daily. 90 tablet 3    ondansetron (ZOFRAN-ODT) 4 MG TbDL Take 1 tablet (4 mg total) by mouth every 6 (six) hours as needed (nausea and vomiting). 30 tablet 3    promethazine (PHENERGAN) 12.5 MG Tab Take 1 tablet (12.5 mg total) by mouth every 6 (six) hours as needed (nausea and vomiting). 30 tablet 3    amitriptyline (ELAVIL) 10 MG tablet Take 1 tablet (10 mg total) by mouth every evening. (Patient not taking: Reported on 12/12/2023) 30 tablet 0    aspirin 81 MG Chew Take 1 tablet (81 mg total) by mouth once daily. (Patient not taking: Reported on 12/12/2023) 30 tablet 0    atorvastatin (LIPITOR) 40 MG tablet Take 1 tablet (40 mg total) by mouth once daily. (Patient not taking: Reported  on 12/12/2023) 30 tablet 0    baclofen (LIORESAL) 5 mg Tab tablet Take 1 tablet (5 mg total) by mouth 3 (three) times daily. (Patient not taking: Reported on 12/12/2023) 90 tablet 0    bethanechol (URECHOLINE) 10 MG Tab Take 1 tablet by mouth 3 (three) times daily.      chlorproMAZINE (THORAZINE) 25 MG tablet Take 1 tablet (25 mg total) by mouth 3 (three) times daily as needed (hiccups). (Patient not taking: Reported on 12/12/2023) 90 tablet 0    gabapentin (NEURONTIN) 100 MG capsule Take 1 capsule (100 mg total) by mouth 3 (three) times daily. (Patient not taking: Reported on 12/12/2023) 90 capsule 11    GAVILYTE-C 240-22.72-6.72 -5.84 gram SolR Take 4,000 mLs by mouth once.      heparin sod,pork in 0.45% NaCl (HEPARIN, PORCINE, 100 UNITS) 100 unit/100 mL (1 unit/mL) SolP in sodium chloride 0.45 % 100 mL infusion Inject 1 mL/hr into the vein continuous.      methoxy peg-epoetin beta (MIRCERA INJ) Inject 50 mcg into the skin every 28 days.      pantoprazole (PROTONIX) 40 MG tablet Take 1 tablet (40 mg total) by mouth once daily. (Patient not taking: Reported on 12/12/2023) 90 tablet 3    sevelamer carbonate (RENVELA) 800 mg Tab Take 1 tablet (800 mg total) by mouth 3 (three) times daily with meals. (Patient not taking: Reported on 12/12/2023) 90 tablet 11    sucroferric oxyhydroxide (VELPHORO) 500 mg Chew Take 1 tablet by mouth once daily.      torsemide (DEMADEX) 20 MG Tab Take 1 tablet by mouth once daily.         Allergies:  Patient has no known allergies.    Past Family History:  Reviewed; refer to Hospitalist Admission Note    Review of Systems:  Review of Systems - All 14 systems reviewed and negative, except as noted in HPI    Physical Exam:  General Appearance:    NAD, AAO x 3, cooperative, appears stated age   Head:    Normocephalic, atraumatic   Eyes:    PER, EOMI, and conjunctiva/sclera clear bilaterally       Mouth:   Moist mucus membranes, no thrush or oral lesions,       normal dentition   Back:     No  CVA tenderness   Lungs:     Clear to auscultation bilaterally, no wheezes, crackles,           rales or rhonchi, symmetric air movement, respirations unlabored   Chest wall:    No tenderness or deformity   Heart:    Regular rate and rhythm, S1 and S2 normal, no murmur, rub   or gallop   Abdomen:     Soft, non-tender, non-distended, bowel sounds active all four   quadrants, no RT or guarding, no masses, no organomegaly   Extremities:   Warm and well perfused, distal pulses are intact, no             cyanosis or peripheral edema   MSK:   No joint or muscle swelling, tenderness or deformity   Skin:   Skin color, texture, turgor normal, no rashes or lesions   Neurologic/Psychiatric:   CNII-XII intact, normal strength and sensation       throughout, no asterixis; normal affect, memory, judgement     and insight      Results:  Lab Results   Component Value Date     (L) 12/12/2023    K 4.1 12/12/2023     12/12/2023    CO2 16 (L) 12/12/2023    BUN 55 (H) 12/12/2023    CREATININE 9.6 (H) 12/12/2023    CALCIUM 9.3 12/12/2023    ANIONGAP 16 12/12/2023    ESTGFRAFRICA 10.4 (A) 02/23/2022    EGFRNONAA 9.0 (A) 02/23/2022       Lab Results   Component Value Date    CALCIUM 9.3 12/12/2023    PHOS 2.9 12/07/2023       Recent Labs   Lab 12/12/23  0414   WBC 4.94   RBC 3.84*   HGB 11.1*   HCT 34.5*      MCV 90   MCH 28.9   MCHC 32.2       I have personally reviewed pertinent radiological imaging and reports.    I have spent > 70 minutes providing care for this patient for the above diagnoses. These services have included chart/data/imaging review, evaluation, exam, formulation of plan, , note preparation, and discussions with staff involved in this patient's care.    Oscar Canas MD MPH  [facility]  [address] [place] [id number]  [phone] (p)  [phone] (f)

## 2023-12-12 NOTE — PLAN OF CARE
CM sent pts HH packet and orders to [facility], [facility], [facility], [facility], and [facility]  to review for acceptance. CM following    [facility] is currently processing it and will get back with me regarding a decision..    12/12/23 1354   Post-Acute Status   Post-Acute Authorization Home Health   Home Health Status Referrals Sent   Patient choice form signed by patient/caregiver List with quality metrics by geographic area provided

## 2023-12-12 NOTE — PROGRESS NOTES
"[facility]  Progress Note    Patient Name: Catalino Mcfadden  MRN: [id number]  Patient Class: OP- Observation   Admission Date: 12/11/2023  Length of Stay: 0 days  Attending Physician: Judy Lane MD  Primary Care Provider: Marty Rodríguez PA-C        Subjective:     Principal Problem:Abnormal MRI, spine        HPI:  Catalino Mcfadden is a 57 y/o M with past medical history significant for HTN, ESRD on HD, DM2, gastroparesis, diabetic foot ulcer, chronic hiccups, and HLD who presented to the ED for with episodic dizziness, vomiting, and lower extremity weakness following a fall x 1 day. He reports nausea has been resolved with Zofran. He denies chest pain, shortness of breath, fever, chills, abdominal pain, headaches, light-headedness, numbness, tingling, or LOC. He reports he missed his Saturday hemodialysis treatment but states last week he received HD Tues, Wed, Thurs, during hospitaliztion. He was recently discharged 12/07. Lab work reflective of ESRD with elevated bili, ALP, and AST. In the ED blood pressure 200s/100s, received IV and PO antihypertensive medication with mild improvemnt. He states he did not take his medication yesterday due to N/V. During examination patient states he is unable to lift his lower extremities, however, was able to walk into the ED with assistance from a friend. CT Head negative. Admitted to hospital medicine for hypertensive emergency evaluation and treatment with nephrology and neurology consults placed.                     Overview/Hospital Course:  Patient presented with hypertensive emergency not taking medication for blood pressure.  He reports nausea which has resolved in early morning he was able to tolerate breakfast.  Patient had also reported a fall at home a few days prior to presentation.  MRI lumbar spine obtained that showed "1. Multiple scattered osseous lesions throughout the visualized lumbosacral spine and iliac bones, " "nonspecific, but given findings on comparison chest CT from 03/22/2023 are concerning for metastatic disease.  Further evaluation recommended.   2. Mild multilevel degenerative change of the lumbar spine, as detailed above, most pronounced at L3-4 through L5-S1 and resulting in mild neural foraminal stenosis.  No significant spinal canal stenosis at any lumbar level" Awaiting neurology opinion - on dialysis now - Consult to oncology - PT/OT following reports unusual weakness     INTERVAL HISTORY: weakness - abnormal MRI spine      Past Medical History:   Diagnosis Date    Diabetes mellitus, type 2     Diabetic foot ulcer associated with diabetes mellitus due to underlying condition 07/16/2020    ESRD on hemodialysis     Hyperlipidemia     Hypertension     Obese        Past Surgical History:   Procedure Laterality Date    AV FISTULA PLACEMENT Left 07/06/2023    Procedure: CREATION, AV FISTULA;  Surgeon: Daniel Poon MD;  Location: [facility] OR;  Service: Vascular;  Laterality: Left;  RN PREOP 6/28/2023  LABS DAY OF SURGERY    BONE BIOPSY Left 07/17/2020    Procedure: BIOPSY, BONE;  Surgeon: Verona Whitmore DPM;  Location: [facility] OR;  Service: Podiatry;  Laterality: Left;    CERVICAL DISC SURGERY  07/11/2016    DEBRIDEMENT Left 07/17/2020    Procedure: DEBRIDEMENT;  Surgeon: Verona Whitmore DPM;  Location: [facility] OR;  Service: Podiatry;  Laterality: Left;    DEBRIDEMENT OF FOOT Right     toes & plantar surface    ESOPHAGEAL MANOMETRY WITH MEASUREMENT OF IMPEDANCE N/A 03/27/2023    Procedure: MANOMETRY, ESOPHAGUS, WITH IMPEDANCE MEASUREMENT;  Surgeon: Roopa Pérez MD;  Location: [facility] ([address] [place] [address]);  Service: Endoscopy;  Laterality: N/A;  Belching and rumination protocol please  instructions via portal - sm    ESOPHAGOGASTRODUODENOSCOPY N/A 12/16/2022    Procedure: EGD (ESOPHAGOGASTRODUODENOSCOPY);  Surgeon: Eren Francois MD;  Location: [facility];  Service: Endoscopy;  Laterality: N/A;  Pt. on Dialysis. K+ " ordered prior to procedure.EC    ESOPHAGOGASTRODUODENOSCOPY N/A 12/5/2023    Procedure: EGD (ESOPHAGOGASTRODUODENOSCOPY);  Surgeon: Kash Johnston MD;  Location: [facility];  Service: Endoscopy;  Laterality: N/A;    FRACTURE SURGERY Right     medial ankle, metal plate present    INSERTION OF TUNNELED CENTRAL VENOUS CATHETER (CVC) WITH SUBCUTANEOUS PORT N/A 06/11/2021    Procedure: TUNNEL CATH INSERTION WITHOUT PORT;  Surgeon: Jose Manuel Diagnostic Provider;  Location: [facility] OR;  Service: Radiology;  Laterality: N/A;  11AM START---PHONE PREOP 6/10/21---COVID POSTIVE ON 7/2020---NO S/S    left leg orthopedic surgery Left     UPPER GASTROINTESTINAL ENDOSCOPY         Review of patient's allergies indicates:  No Known Allergies    No current facility-administered medications on file prior to encounter.     Current Outpatient Medications on File Prior to Encounter   Medication Sig    hydrALAZINE (APRESOLINE) 100 MG tablet Take 1 tablet (100 mg total) by mouth 3 (three) times daily.    NIFEdipine (PROCARDIA XL) 90 MG (OSM) 24 hr tablet Take 1 tablet (90 mg total) by mouth once daily.    promethazine (PHENERGAN) 12.5 MG Tab Take 1 tablet (12.5 mg total) by mouth every 6 (six) hours as needed (nausea and vomiting).    [DISCONTINUED] ondansetron (ZOFRAN-ODT) 4 MG TbDL Take 1 tablet (4 mg total) by mouth every 6 (six) hours as needed (nausea and vomiting).    amitriptyline (ELAVIL) 10 MG tablet Take 1 tablet (10 mg total) by mouth every evening. (Patient not taking: Reported on 12/12/2023)    GAVILYTE-C 240-22.72-6.72 -5.84 gram SolR Take 4,000 mLs by mouth once.    heparin sod,pork in 0.45% NaCl (HEPARIN, PORCINE, 100 UNITS) 100 unit/100 mL (1 unit/mL) SolP in sodium chloride 0.45 % 100 mL infusion Inject 1 mL/hr into the vein continuous.    methoxy peg-epoetin beta (MIRCERA INJ) Inject 50 mcg into the skin every 28 days.    sevelamer carbonate (RENVELA) 800 mg Tab Take 1 tablet (800 mg total) by mouth 3 (three) times daily with  meals. (Patient not taking: Reported on 12/12/2023)    sucroferric oxyhydroxide (VELPHORO) 500 mg Chew Take 1 tablet by mouth once daily.    torsemide (DEMADEX) 20 MG Tab Take 1 tablet by mouth once daily.    [DISCONTINUED] aspirin 81 MG Chew Take 1 tablet (81 mg total) by mouth once daily. (Patient not taking: Reported on 12/12/2023)    [DISCONTINUED] atorvastatin (LIPITOR) 40 MG tablet Take 1 tablet (40 mg total) by mouth once daily. (Patient not taking: Reported on 12/12/2023)    [DISCONTINUED] baclofen (LIORESAL) 5 mg Tab tablet Take 1 tablet (5 mg total) by mouth 3 (three) times daily. (Patient not taking: Reported on 12/12/2023)    [DISCONTINUED] bethanechol (URECHOLINE) 10 MG Tab Take 1 tablet by mouth 3 (three) times daily.    [DISCONTINUED] chlorproMAZINE (THORAZINE) 25 MG tablet Take 1 tablet (25 mg total) by mouth 3 (three) times daily as needed (hiccups). (Patient not taking: Reported on 12/12/2023)    [DISCONTINUED] gabapentin (NEURONTIN) 100 MG capsule Take 1 capsule (100 mg total) by mouth 3 (three) times daily. (Patient not taking: Reported on 12/12/2023)    [DISCONTINUED] pantoprazole (PROTONIX) 40 MG tablet Take 1 tablet (40 mg total) by mouth once daily. (Patient not taking: Reported on 12/12/2023)     Family History       Problem Relation (Age of Onset)    Heart disease Father          Tobacco Use    Smoking status: Never    Smokeless tobacco: Never   Substance and Sexual Activity    Alcohol use: Not Currently     Comment: occ rare beer    Drug use: No    Sexual activity: Not Currently     Review of Systems   Constitutional:  Positive for activity change and fatigue. Negative for appetite change, chills, diaphoresis and fever.   HENT: Negative.     Eyes: Negative.    Respiratory:  Negative for cough, shortness of breath and wheezing.    Cardiovascular:  Negative for chest pain and leg swelling.   Gastrointestinal:  Negative for abdominal distention, abdominal pain, constipation, diarrhea, nausea  and vomiting.   Endocrine: Negative.    Genitourinary:  Negative for difficulty urinating and dysuria.   Musculoskeletal:  Negative for arthralgias, back pain and myalgias.   Neurological:  Positive for weakness (bilateral lower extremity). Negative for syncope, facial asymmetry, speech difficulty, light-headedness, numbness and headaches.   Hematological: Negative.    Psychiatric/Behavioral: Negative.       Objective:     Vital Signs (Most Recent):  Temp: 97.3 °F (36.3 °C) (12/12/23 1240)  Pulse: 80 (12/12/23 1530)  Resp: 18 (12/12/23 1240)  BP: (!) 155/82 (12/12/23 1633)  SpO2: 98 % (12/12/23 1132) Vital Signs (24h Range):  Temp:  [97.3 °F (36.3 °C)-98.4 °F (36.9 °C)] 97.3 °F (36.3 °C)  Pulse:  [66-92] 80  Resp:  [16-18] 18  SpO2:  [93 %-100 %] 98 %  BP: (135-262)/() 155/82     Weight: 91.1 kg (200 lb 13.4 oz)  Body mass index is 26.5 kg/m².     Physical Exam  Vitals and nursing note reviewed.   Constitutional:       General: He is not in acute distress.     Appearance: Normal appearance. He is not toxic-appearing.   Cardiovascular:      Rate and Rhythm: Normal rate and regular rhythm.      Heart sounds: No murmur heard.     No gallop.   Pulmonary:      Effort: Pulmonary effort is normal. No respiratory distress.      Breath sounds: Normal breath sounds. No wheezing.   Abdominal:      General: Bowel sounds are normal. There is no distension.      Palpations: Abdomen is soft.      Tenderness: There is no abdominal tenderness.   Skin:     General: Skin is warm and dry.      Capillary Refill: Capillary refill takes less than 2 seconds.      Comments: Left upper arm AV Fistula noted with positive bruit and thrill   Neurological:      Mental Status: He is alert and oriented to person, place, and time. Mental status is at baseline.      GCS: GCS eye subscore is 4. GCS verbal subscore is 5. GCS motor subscore is 6.      Sensory: Sensation is intact.      Motor: Weakness (bilateral lower extremity) present.       Comments: Reports unable to lift legs against gravity due to weakness   Psychiatric:         Mood and Affect: Mood normal.                Significant Labs: All pertinent labs within the past 24 hours have been reviewed.  BMP:   Recent Labs   Lab 12/12/23  0414   *   *   K 4.1      CO2 16*   BUN 55*   CREATININE 9.6*   CALCIUM 9.3   MG 2.2       CBC:   Recent Labs   Lab 12/11/23  1728 12/12/23  0414   WBC 6.86 4.94   HGB 12.6* 11.1*   HCT 38.8* 34.5*    212       CMP:   Recent Labs   Lab 12/11/23  1728 12/12/23  0414   * 134*   K 4.7 4.1    102   CO2 19* 16*    158*   BUN 53* 55*   CREATININE 9.3* 9.6*   CALCIUM 10.2 9.3   PROT 8.5* 7.1   ALBUMIN 3.0* 2.5*   BILITOT 1.5* 1.2*   ALKPHOS 854* 693*   AST 64* 48*   ALT 30 25   ANIONGAP 15 16         Significant Imaging:     Imaging Results              CT Head Without Contrast (Final result)  Result time 12/12/23 07:46:24      Final result by Jj Serna MD (12/12/23 07:46:24)                   Impression:      No evidence of an acute intracranial abnormality.    Final read.      Electronically signed by: Jj Serna  Date:    12/12/2023  Time:    07:46               Narrative:    EXAMINATION:  CT HEAD WITHOUT CONTRAST    CLINICAL HISTORY:  Dizziness, persistent/recurrent, cardiac or vascular cause suspected;    TECHNIQUE:  Low dose axial images were obtained through the head.  Coronal and sagittal reformations were also performed. Contrast was not administered.    COMPARISON:  02/22/2023    FINDINGS:  Ventricles and sulci are normal in size for age without evidence of hydrocephalus.    Mild scattered hypoattenuation within the supratentorial white matter, nonspecific but probably reflecting sequelae of chronic microvascular ischemic change.   No evidence of acute parenchymal hemorrhage, edema, significant mass effect or major vascular distribution infarct.    No extra-axial blood or fluid collections.    No displaced  calvarial fracture.    The mastoid air cells and visualized paranasal sinuses are essentially clear.    Calcific atherosclerosis of the internal carotid and vertebral arteries at the skull base.                                       X-Ray Chest AP Portable (Final result)  Result time 12/12/23 08:31:55      Final result by Angy Beckford MD (12/12/23 08:31:55)                   Impression:      Improvement in the appearance of the chest with decrease of lung infiltrates suggesting improving pulmonary edema      Electronically signed by: Angy Beckford MD  Date:    12/12/2023  Time:    08:31               Narrative:    EXAMINATION:  XR CHEST AP PORTABLE    CLINICAL HISTORY:  Dizziness and giddiness    TECHNIQUE:  Single frontal view of the chest was performed.    COMPARISON:  12/04/2023    FINDINGS:  Stable cardiomediastinal silhouette.  Appear mildly enlarged as before.  Right-sided internal jugular venous catheter remains in place with the distal tip at the level of the caval atrial junction.  Prominent lung markings suggesting mild pulmonary vascular distention with this is decreased compared to the prior exam.  No pleural effusion                                        Assessment/Plan:      * Abnormal MRI, spine  Concerning MRI spine for metas tic disease  Consult to Onclology  Neuro consulted and following paraesthesias  PT/OT      Hypertensive emergency  Patient has a current diagnosis of Hypertensive emergency with end organ damage evidenced by acute kidney injury which is uncontrolled.  Latest blood pressure and vitals reviewed-   Temp:  [97.3 °F (36.3 °C)-98.4 °F (36.9 °C)]   Pulse:  [66-92]   Resp:  [16-18]   BP: (135-262)/()   SpO2:  [93 %-100 %] .   Patient currently on IV antihypertensives.   Home meds for hypertension were reviewed and noted below.   Hypertension Medications               hydrALAZINE (APRESOLINE) 100 MG tablet Take 1 tablet (100 mg total) by mouth 3 (three) times daily.     "NIFEdipine (PROCARDIA XL) 90 MG (OSM) 24 hr tablet Take 1 tablet (90 mg total) by mouth once daily.    torsemide (DEMADEX) 20 MG Tab Take 1 tablet by mouth once daily.              Will aim for controlled BP reduction by medications noted above. Monitor and mitigate end organ damage as indicated.  BP improved with re-introducing home meds  Follow BP trends and adjust as warranted    ESRD on hemodialysis  Creatine stable for now. BMP reviewed- noted Estimated Creatinine Clearance: 9.4 mL/min (A) (based on SCr of 9.6 mg/dL (H)). according to latest data. Based on current GFR, CKD stage is end stage.  Monitor UOP and serial BMP and adjust therapy as needed. Renally dose meds. Avoid nephrotoxic medications and procedures.  -AV fistula noted without complications  -dialysis ongoing  -nephrology following    Lower extremity weakness  Possibly due to hypertensive emergency; patient states unable to lift legs against gravity  S/p fall at home due to bilateral lower extremity weakness  CT head negative  Neurology consult placed      Serum total bilirubin elevated  Chronic condition noted  Monitor bmp    Intractable hiccups  Chronic condition  Continue prn thorazine       Type 2 diabetes mellitus with stage 5 chronic kidney disease  Patient's FSGs are controlled on current medication regimen.  Last A1c reviewed-   Lab Results   Component Value Date    HGBA1C 5.1 02/22/2023     Most recent fingerstick glucose reviewed- No results for input(s): "POCTGLUCOSE" in the last 24 hours.  Current correctional scale  Medium  Maintain anti-hyperglycemic dose as follows-   Antihyperglycemics (From admission, onward)      Start     Stop Route Frequency Ordered    12/12/23 0054  insulin aspart U-100 pen 0-10 Units         -- SubQ Before meals & nightly PRN 12/12/23 0054          Hold Oral hypoglycemics while patient is in the hospital.      VTE Risk Mitigation (From admission, onward)           Ordered     heparin (porcine) injection 5,000 " Units  Every 8 hours         12/12/23 0054     heparin (porcine) injection 5,000 Units  As needed (PRN)         12/12/23 0056     IP VTE HIGH RISK PATIENT  Once         12/12/23 0054     Place sequential compression device  Until discontinued         12/12/23 0054                    Discharge Planning   WILMER: 12/13/2023     Code Status: Full Code   Is the patient medically ready for discharge?:     Reason for patient still in hospital (select all that apply): Patient new problem, Patient trending condition, and Treatment  Discharge Plan A: Home   Discharge Delays: None known at this time              SOHAM Jones  Department of Hospital Medicine   [facility]/Surg

## 2023-12-12 NOTE — ASSESSMENT & PLAN NOTE
Creatine stable for now. BMP reviewed- noted Estimated Creatinine Clearance: 9.4 mL/min (A) (based on SCr of 9.6 mg/dL (H)). according to latest data. Based on current GFR, CKD stage is end stage.  Monitor UOP and serial BMP and adjust therapy as needed. Renally dose meds. Avoid nephrotoxic medications and procedures.  -AV fistula noted without complications  -dialysis ongoing  -nephrology following

## 2023-12-12 NOTE — ASSESSMENT & PLAN NOTE
Patient has a current diagnosis of Hypertensive emergency with end organ damage evidenced by acute kidney injury which is uncontrolled.  Latest blood pressure and vitals reviewed-   Temp:  [98.3 °F (36.8 °C)]   Pulse:  [66-92]   Resp:  [16-20]   BP: (189-262)/()   SpO2:  [93 %-99 %] .   Patient currently on IV antihypertensives.   Home meds for hypertension were reviewed and noted below.   Hypertension Medications               hydrALAZINE (APRESOLINE) 100 MG tablet Take 1 tablet (100 mg total) by mouth 3 (three) times daily.    NIFEdipine (PROCARDIA XL) 90 MG (OSM) 24 hr tablet Take 1 tablet (90 mg total) by mouth once daily.    torsemide (DEMADEX) 20 MG Tab Take 1 tablet by mouth once daily.              Will aim for controlled BP reduction by medications noted above. Monitor and mitigate end organ damage as indicated.

## 2023-12-12 NOTE — PLAN OF CARE
Problem: Physical Therapy  Goal: Physical Therapy Goal  Description: Goals to be met by: 23     Patient will increase functional independence with mobility by performin. Supine to sit with Stand-by Assistance  2. Sit to supine with Stand-by Assistance  3. Sit to stand transfer with Contact Guard Assistance  4. Bed to chair transfer with Contact Guard Assistance using Rolling Walker  5. Gait  x 25 feet with Contact Guard Assistance using Rolling Walker.     Outcome: Ongoing, Progressing

## 2023-12-12 NOTE — PROGRESS NOTES
3600 ml net uf removed   12/12/23 1555   Required for all Hemodialysis Patients   Hepatitis Status negative   Handoff Report   Received From Arely Starks   Treatment Type   Treatment Type Maintenance   Vital Signs   Temp 97.7 °F (36.5 °C)   Pulse 78   Resp 18   BP (!) 155/84   Assessments (Pre/Post)   Date Hepatitis Profile Obtained 12/05/23   Blood Liters Processed (BLP) 50   Transport Modality ambulatory   Level of Consciousness (AVPU) alert   Dialyzer Clearance moderately streaked        Hemodialysis Catheter right subclavian   No placement date or time found.   Present Prior to Hospital Arrival?: Yes  Hemodialysis Catheter Type: Tunneled catheter  Location: right subclavian  Placement Verification: Blood return   Site Assessment No drainage;No redness;No swelling;No warmth   Line Securement Device Secured with sutures   Dressing Status Clean;Dry;Intact   Dressing Intervention Integrity maintained   Date on Dressing 12/12/23   Dressing Due to be Changed 12/18/23   Venous Patency/Care flushed w/o difficulty;normal saline locked;deaccessed   Arterial Patency/Care flushed w/o difficulty;normal saline locked;deaccessed   Post-Hemodialysis Assessment   Rinseback Volume (mL) 250 mL   Blood Volume Processed (Liters) 50 L   Dialyzer Clearance Moderately streaked   Duration of Treatment 180 minutes   Additional Fluid Intake (mL) 500 mL   Total UF (mL) 4100 mL   Net Fluid Removal 3600   Patient Response to Treatment tolerated well   Post-Treatment Weight 87.5 kg (192 lb 14.4 oz)   Treatment Weight Change -3.6   Post-Hemodialysis Comments tx completed   Edema   Edema generalized     Educated on treatment adherence

## 2023-12-12 NOTE — SUBJECTIVE & OBJECTIVE
INTERVAL HISTORY: weakness - abnormal MRI spine      Past Medical History:   Diagnosis Date    Diabetes mellitus, type 2     Diabetic foot ulcer associated with diabetes mellitus due to underlying condition 07/16/2020    ESRD on hemodialysis     Hyperlipidemia     Hypertension     Obese        Past Surgical History:   Procedure Laterality Date    AV FISTULA PLACEMENT Left 07/06/2023    Procedure: CREATION, AV FISTULA;  Surgeon: Daniel Poon MD;  Location: [facility];  Service: Vascular;  Laterality: Left;  RN PREOP 6/28/2023  LABS DAY OF SURGERY    BONE BIOPSY Left 07/17/2020    Procedure: BIOPSY, BONE;  Surgeon: Verona Whitmore DPM;  Location: [facility];  Service: Podiatry;  Laterality: Left;    CERVICAL DISC SURGERY  07/11/2016    DEBRIDEMENT Left 07/17/2020    Procedure: DEBRIDEMENT;  Surgeon: Verona Whitmore DPM;  Location: [facility] OR;  Service: Podiatry;  Laterality: Left;    DEBRIDEMENT OF FOOT Right     toes & plantar surface    ESOPHAGEAL MANOMETRY WITH MEASUREMENT OF IMPEDANCE N/A 03/27/2023    Procedure: MANOMETRY, ESOPHAGUS, WITH IMPEDANCE MEASUREMENT;  Surgeon: Roopa Pérez MD;  [facility]  Service: Endoscopy;  Laterality: N/A;  Belching and rumination protocol please  instructions via portal - sm    ESOPHAGOGASTRODUODENOSCOPY N/A 12/16/2022    Procedure: EGD (ESOPHAGOGASTRODUODENOSCOPY);  Surgeon: Eren Francois MD;  Location: [facility];  Service: Endoscopy;  Laterality: N/A;  Pt. on Dialysis. K+ ordered prior to procedure.EC    ESOPHAGOGASTRODUODENOSCOPY N/A 12/5/2023    Procedure: EGD (ESOPHAGOGASTRODUODENOSCOPY);  Surgeon: Kash Johnston MD;  Location: [facility];  Service: Endoscopy;  Laterality: N/A;    FRACTURE SURGERY Right     medial ankle, metal plate present    INSERTION OF TUNNELED CENTRAL VENOUS CATHETER (CVC) WITH SUBCUTANEOUS PORT N/A 06/11/2021    Procedure: TUNNEL CATH INSERTION WITHOUT PORT;  Surgeon: Jose Manuel Diagnostic Provider;  Location: [facility];   Service: Radiology;  Laterality: N/A;  11AM START---PHONE PREOP 6/10/21---COVID POSTIVE ON 7/2020---NO S/S    left leg orthopedic surgery Left     UPPER GASTROINTESTINAL ENDOSCOPY         Review of patient's allergies indicates:  No Known Allergies    No current facility-administered medications on file prior to encounter.     Current Outpatient Medications on File Prior to Encounter   Medication Sig    hydrALAZINE (APRESOLINE) 100 MG tablet Take 1 tablet (100 mg total) by mouth 3 (three) times daily.    NIFEdipine (PROCARDIA XL) 90 MG (OSM) 24 hr tablet Take 1 tablet (90 mg total) by mouth once daily.    promethazine (PHENERGAN) 12.5 MG Tab Take 1 tablet (12.5 mg total) by mouth every 6 (six) hours as needed (nausea and vomiting).    [DISCONTINUED] ondansetron (ZOFRAN-ODT) 4 MG TbDL Take 1 tablet (4 mg total) by mouth every 6 (six) hours as needed (nausea and vomiting).    amitriptyline (ELAVIL) 10 MG tablet Take 1 tablet (10 mg total) by mouth every evening. (Patient not taking: Reported on 12/12/2023)    GAVILYTE-C 240-22.72-6.72 -5.84 gram SolR Take 4,000 mLs by mouth once.    heparin sod,pork in 0.45% NaCl (HEPARIN, PORCINE, 100 UNITS) 100 unit/100 mL (1 unit/mL) SolP in sodium chloride 0.45 % 100 mL infusion Inject 1 mL/hr into the vein continuous.    methoxy peg-epoetin beta (MIRCERA INJ) Inject 50 mcg into the skin every 28 days.    sevelamer carbonate (RENVELA) 800 mg Tab Take 1 tablet (800 mg total) by mouth 3 (three) times daily with meals. (Patient not taking: Reported on 12/12/2023)    sucroferric oxyhydroxide (VELPHORO) 500 mg Chew Take 1 tablet by mouth once daily.    torsemide (DEMADEX) 20 MG Tab Take 1 tablet by mouth once daily.    [DISCONTINUED] aspirin 81 MG Chew Take 1 tablet (81 mg total) by mouth once daily. (Patient not taking: Reported on 12/12/2023)    [DISCONTINUED] atorvastatin (LIPITOR) 40 MG tablet Take 1 tablet (40 mg total) by mouth once daily. (Patient not taking: Reported on  12/12/2023)    [DISCONTINUED] baclofen (LIORESAL) 5 mg Tab tablet Take 1 tablet (5 mg total) by mouth 3 (three) times daily. (Patient not taking: Reported on 12/12/2023)    [DISCONTINUED] bethanechol (URECHOLINE) 10 MG Tab Take 1 tablet by mouth 3 (three) times daily.    [DISCONTINUED] chlorproMAZINE (THORAZINE) 25 MG tablet Take 1 tablet (25 mg total) by mouth 3 (three) times daily as needed (hiccups). (Patient not taking: Reported on 12/12/2023)    [DISCONTINUED] gabapentin (NEURONTIN) 100 MG capsule Take 1 capsule (100 mg total) by mouth 3 (three) times daily. (Patient not taking: Reported on 12/12/2023)    [DISCONTINUED] pantoprazole (PROTONIX) 40 MG tablet Take 1 tablet (40 mg total) by mouth once daily. (Patient not taking: Reported on 12/12/2023)     Family History       Problem Relation (Age of Onset)    Heart disease Father          Tobacco Use    Smoking status: Never    Smokeless tobacco: Never   Substance and Sexual Activity    Alcohol use: Not Currently     Comment: occ rare beer    Drug use: No    Sexual activity: Not Currently     Review of Systems   Constitutional:  Positive for activity change and fatigue. Negative for appetite change, chills, diaphoresis and fever.   HENT: Negative.     Eyes: Negative.    Respiratory:  Negative for cough, shortness of breath and wheezing.    Cardiovascular:  Negative for chest pain and leg swelling.   Gastrointestinal:  Negative for abdominal distention, abdominal pain, constipation, diarrhea, nausea and vomiting.   Endocrine: Negative.    Genitourinary:  Negative for difficulty urinating and dysuria.   Musculoskeletal:  Negative for arthralgias, back pain and myalgias.   Neurological:  Positive for weakness (bilateral lower extremity). Negative for syncope, facial asymmetry, speech difficulty, light-headedness, numbness and headaches.   Hematological: Negative.    Psychiatric/Behavioral: Negative.       Objective:     Vital Signs (Most Recent):  Temp: 97.3 °F  (36.3 °C) (12/12/23 1240)  Pulse: 80 (12/12/23 1530)  Resp: 18 (12/12/23 1240)  BP: (!) 155/82 (12/12/23 1633)  SpO2: 98 % (12/12/23 1132) Vital Signs (24h Range):  Temp:  [97.3 °F (36.3 °C)-98.4 °F (36.9 °C)] 97.3 °F (36.3 °C)  Pulse:  [66-92] 80  Resp:  [16-18] 18  SpO2:  [93 %-100 %] 98 %  BP: (135-262)/() 155/82     Weight: 91.1 kg (200 lb 13.4 oz)  Body mass index is 26.5 kg/m².     Physical Exam  Vitals and nursing note reviewed.   Constitutional:       General: He is not in acute distress.     Appearance: Normal appearance. He is not toxic-appearing.   Cardiovascular:      Rate and Rhythm: Normal rate and regular rhythm.      Heart sounds: No murmur heard.     No gallop.   Pulmonary:      Effort: Pulmonary effort is normal. No respiratory distress.      Breath sounds: Normal breath sounds. No wheezing.   Abdominal:      General: Bowel sounds are normal. There is no distension.      Palpations: Abdomen is soft.      Tenderness: There is no abdominal tenderness.   Skin:     General: Skin is warm and dry.      Capillary Refill: Capillary refill takes less than 2 seconds.      Comments: Left upper arm AV Fistula noted with positive bruit and thrill   Neurological:      Mental Status: He is alert and oriented to person, place, and time. Mental status is at baseline.      GCS: GCS eye subscore is 4. GCS verbal subscore is 5. GCS motor subscore is 6.      Sensory: Sensation is intact.      Motor: Weakness (bilateral lower extremity) present.      Comments: Reports unable to lift legs against gravity due to weakness   Psychiatric:         Mood and Affect: Mood normal.                Significant Labs: All pertinent labs within the past 24 hours have been reviewed.  BMP:   Recent Labs   Lab 12/12/23  0414   *   *   K 4.1      CO2 16*   BUN 55*   CREATININE 9.6*   CALCIUM 9.3   MG 2.2       CBC:   Recent Labs   Lab 12/11/23  1728 12/12/23  0414   WBC 6.86 4.94   HGB 12.6* 11.1*   HCT 38.8* 34.5*     212       CMP:   Recent Labs   Lab 12/11/23  1728 12/12/23  0414   * 134*   K 4.7 4.1    102   CO2 19* 16*    158*   BUN 53* 55*   CREATININE 9.3* 9.6*   CALCIUM 10.2 9.3   PROT 8.5* 7.1   ALBUMIN 3.0* 2.5*   BILITOT 1.5* 1.2*   ALKPHOS 854* 693*   AST 64* 48*   ALT 30 25   ANIONGAP 15 16         Significant Imaging:     Imaging Results              CT Head Without Contrast (Final result)  Result time 12/12/23 07:46:24      Final result by Jj Serna MD (12/12/23 07:46:24)                   Impression:      No evidence of an acute intracranial abnormality.    Final read.      Electronically signed by: Jj Serna  Date:    12/12/2023  Time:    07:46               Narrative:    EXAMINATION:  CT HEAD WITHOUT CONTRAST    CLINICAL HISTORY:  Dizziness, persistent/recurrent, cardiac or vascular cause suspected;    TECHNIQUE:  Low dose axial images were obtained through the head.  Coronal and sagittal reformations were also performed. Contrast was not administered.    COMPARISON:  02/22/2023    FINDINGS:  Ventricles and sulci are normal in size for age without evidence of hydrocephalus.    Mild scattered hypoattenuation within the supratentorial white matter, nonspecific but probably reflecting sequelae of chronic microvascular ischemic change.   No evidence of acute parenchymal hemorrhage, edema, significant mass effect or major vascular distribution infarct.    No extra-axial blood or fluid collections.    No displaced calvarial fracture.    The mastoid air cells and visualized paranasal sinuses are essentially clear.    Calcific atherosclerosis of the internal carotid and vertebral arteries at the skull base.                                       X-Ray Chest AP Portable (Final result)  Result time 12/12/23 08:31:55      Final result by Angy Beckford MD (12/12/23 08:31:55)                   Impression:      Improvement in the appearance of the chest with decrease of lung infiltrates  suggesting improving pulmonary edema      Electronically signed by: Angy Beckford MD  Date:    12/12/2023  Time:    08:31               Narrative:    EXAMINATION:  XR CHEST AP PORTABLE    CLINICAL HISTORY:  Dizziness and giddiness    TECHNIQUE:  Single frontal view of the chest was performed.    COMPARISON:  12/04/2023    FINDINGS:  Stable cardiomediastinal silhouette.  Appear mildly enlarged as before.  Right-sided internal jugular venous catheter remains in place with the distal tip at the level of the caval atrial junction.  Prominent lung markings suggesting mild pulmonary vascular distention with this is decreased compared to the prior exam.  No pleural effusion

## 2023-12-12 NOTE — PLAN OF CARE
PCP f/u with MILAN Rivers added to AVS for Monday 12/18/23 for 3:00 pm. AVS updated.    12/12/23 1133   Discharge Assessment   Assessment Type Discharge Planning Reassessment

## 2023-12-12 NOTE — PLAN OF CARE
Problem: Adult Inpatient Plan of Care  Goal: Plan of Care Review  Outcome: Ongoing, Progressing  Goal: Patient-Specific Goal (Individualized)  Outcome: Ongoing, Progressing  Goal: Absence of Hospital-Acquired Illness or Injury  Outcome: Ongoing, Progressing  Goal: Readiness for Transition of Care  Outcome: Ongoing, Progressing     Problem: Fall Injury Risk  Goal: Absence of Fall and Fall-Related Injury  Outcome: Ongoing, Progressing

## 2023-12-13 LAB
ALBUMIN SERPL BCP-MCNC: 2.6 G/DL (ref 3.5–5.2)
ALP SERPL-CCNC: 705 U/L (ref 55–135)
ALT SERPL W/O P-5'-P-CCNC: 28 U/L (ref 10–44)
ANION GAP SERPL CALC-SCNC: 12 MMOL/L (ref 8–16)
AST SERPL-CCNC: 50 U/L (ref 10–40)
BASOPHILS # BLD AUTO: 0.03 K/UL (ref 0–0.2)
BASOPHILS NFR BLD: 0.7 % (ref 0–1.9)
BILIRUB SERPL-MCNC: 1.4 MG/DL (ref 0.1–1)
BUN SERPL-MCNC: 35 MG/DL (ref 6–20)
CALCIUM SERPL-MCNC: 9.1 MG/DL (ref 8.7–10.5)
CHLORIDE SERPL-SCNC: 97 MMOL/L (ref 95–110)
CK SERPL-CCNC: <7 U/L (ref 20–200)
CO2 SERPL-SCNC: 24 MMOL/L (ref 23–29)
CREAT SERPL-MCNC: 7.9 MG/DL (ref 0.5–1.4)
DIFFERENTIAL METHOD BLD: ABNORMAL
EOSINOPHIL # BLD AUTO: 0.1 K/UL (ref 0–0.5)
EOSINOPHIL NFR BLD: 3.1 % (ref 0–8)
ERYTHROCYTE [DISTWIDTH] IN BLOOD BY AUTOMATED COUNT: 16.1 % (ref 11.5–14.5)
EST. GFR  (NO RACE VARIABLE): 7 ML/MIN/1.73 M^2
GLUCOSE SERPL-MCNC: 84 MG/DL (ref 70–110)
HCT VFR BLD AUTO: 35.3 % (ref 40–54)
HGB BLD-MCNC: 11.4 G/DL (ref 14–18)
IMM GRANULOCYTES # BLD AUTO: 0.02 K/UL (ref 0–0.04)
IMM GRANULOCYTES NFR BLD AUTO: 0.4 % (ref 0–0.5)
LDH SERPL L TO P-CCNC: 218 U/L (ref 110–260)
LYMPHOCYTES # BLD AUTO: 0.6 K/UL (ref 1–4.8)
LYMPHOCYTES NFR BLD: 13.5 % (ref 18–48)
MAGNESIUM SERPL-MCNC: 2.2 MG/DL (ref 1.6–2.6)
MCH RBC QN AUTO: 28.9 PG (ref 27–31)
MCHC RBC AUTO-ENTMCNC: 32.3 G/DL (ref 32–36)
MCV RBC AUTO: 90 FL (ref 82–98)
MONOCYTES # BLD AUTO: 0.6 K/UL (ref 0.3–1)
MONOCYTES NFR BLD: 13.9 % (ref 4–15)
NEUTROPHILS # BLD AUTO: 3 K/UL (ref 1.8–7.7)
NEUTROPHILS NFR BLD: 68.4 % (ref 38–73)
NRBC BLD-RTO: 0 /100 WBC
PLATELET # BLD AUTO: 183 K/UL (ref 150–450)
PMV BLD AUTO: 9.6 FL (ref 9.2–12.9)
POCT GLUCOSE: 116 MG/DL (ref 70–110)
POCT GLUCOSE: 82 MG/DL (ref 70–110)
POCT GLUCOSE: 86 MG/DL (ref 70–110)
POCT GLUCOSE: 86 MG/DL (ref 70–110)
POTASSIUM SERPL-SCNC: 4.6 MMOL/L (ref 3.5–5.1)
PROT SERPL-MCNC: 7.3 G/DL (ref 6–8.4)
RBC # BLD AUTO: 3.94 M/UL (ref 4.6–6.2)
SODIUM SERPL-SCNC: 133 MMOL/L (ref 136–145)
WBC # BLD AUTO: 4.45 K/UL (ref 3.9–12.7)

## 2023-12-13 PROCEDURE — 96372 THER/PROPH/DIAG INJ SC/IM: CPT

## 2023-12-13 PROCEDURE — 84165 PROTEIN E-PHORESIS SERUM: CPT | Performed by: NURSE PRACTITIONER

## 2023-12-13 PROCEDURE — 99223 1ST HOSP IP/OBS HIGH 75: CPT | Mod: ,,, | Performed by: NURSE PRACTITIONER

## 2023-12-13 PROCEDURE — 82232 ASSAY OF BETA-2 PROTEIN: CPT | Performed by: NURSE PRACTITIONER

## 2023-12-13 PROCEDURE — 80053 COMPREHEN METABOLIC PANEL: CPT

## 2023-12-13 PROCEDURE — 86255 FLUORESCENT ANTIBODY SCREEN: CPT | Mod: 59 | Performed by: STUDENT IN AN ORGANIZED HEALTH CARE EDUCATION/TRAINING PROGRAM

## 2023-12-13 PROCEDURE — 97116 GAIT TRAINING THERAPY: CPT

## 2023-12-13 PROCEDURE — 83615 LACTATE (LD) (LDH) ENZYME: CPT | Performed by: NURSE PRACTITIONER

## 2023-12-13 PROCEDURE — 94761 N-INVAS EAR/PLS OXIMETRY MLT: CPT

## 2023-12-13 PROCEDURE — 97110 THERAPEUTIC EXERCISES: CPT

## 2023-12-13 PROCEDURE — 36415 COLL VENOUS BLD VENIPUNCTURE: CPT | Performed by: STUDENT IN AN ORGANIZED HEALTH CARE EDUCATION/TRAINING PROGRAM

## 2023-12-13 PROCEDURE — 99900035 HC TECH TIME PER 15 MIN (STAT)

## 2023-12-13 PROCEDURE — 25000003 PHARM REV CODE 250

## 2023-12-13 PROCEDURE — 63600175 PHARM REV CODE 636 W HCPCS

## 2023-12-13 PROCEDURE — 86596 VOLTAGE-GTD CA CHNL ANTB EA: CPT | Performed by: STUDENT IN AN ORGANIZED HEALTH CARE EDUCATION/TRAINING PROGRAM

## 2023-12-13 PROCEDURE — 85025 COMPLETE CBC W/AUTO DIFF WBC: CPT

## 2023-12-13 PROCEDURE — 97530 THERAPEUTIC ACTIVITIES: CPT

## 2023-12-13 PROCEDURE — 82550 ASSAY OF CK (CPK): CPT | Performed by: STUDENT IN AN ORGANIZED HEALTH CARE EDUCATION/TRAINING PROGRAM

## 2023-12-13 PROCEDURE — 83521 IG LIGHT CHAINS FREE EACH: CPT | Performed by: NURSE PRACTITIONER

## 2023-12-13 PROCEDURE — 83735 ASSAY OF MAGNESIUM: CPT

## 2023-12-13 PROCEDURE — A4216 STERILE WATER/SALINE, 10 ML: HCPCS

## 2023-12-13 PROCEDURE — 11000001 HC ACUTE MED/SURG PRIVATE ROOM

## 2023-12-13 RX ADMIN — HYDRALAZINE HYDROCHLORIDE 100 MG: 25 TABLET, FILM COATED ORAL at 08:12

## 2023-12-13 RX ADMIN — NIFEDIPINE 90 MG: 30 TABLET, FILM COATED, EXTENDED RELEASE ORAL at 09:12

## 2023-12-13 RX ADMIN — Medication 10 ML: at 02:12

## 2023-12-13 RX ADMIN — HYDRALAZINE HYDROCHLORIDE 100 MG: 25 TABLET, FILM COATED ORAL at 02:12

## 2023-12-13 RX ADMIN — HEPARIN SODIUM 5000 UNITS: 5000 INJECTION, SOLUTION INTRAVENOUS; SUBCUTANEOUS at 09:12

## 2023-12-13 RX ADMIN — HEPARIN SODIUM 5000 UNITS: 5000 INJECTION, SOLUTION INTRAVENOUS; SUBCUTANEOUS at 05:12

## 2023-12-13 RX ADMIN — Medication 10 ML: at 03:12

## 2023-12-13 RX ADMIN — Medication 10 ML: at 09:12

## 2023-12-13 RX ADMIN — HYDRALAZINE HYDROCHLORIDE 100 MG: 25 TABLET, FILM COATED ORAL at 09:12

## 2023-12-13 RX ADMIN — PANTOPRAZOLE SODIUM 40 MG: 40 TABLET, DELAYED RELEASE ORAL at 09:12

## 2023-12-13 RX ADMIN — Medication 10 ML: at 06:12

## 2023-12-13 RX ADMIN — HEPARIN SODIUM 5000 UNITS: 5000 INJECTION, SOLUTION INTRAVENOUS; SUBCUTANEOUS at 03:12

## 2023-12-13 NOTE — PROGRESS NOTES
"[facility] Medicine  Progress Note    Patient Name: Catalino Mcfadden  MRN: [id number]  Patient Class: OP- Observation   Admission Date: 12/11/2023  Length of Stay: 0 days  Attending Physician: Judy Lane MD  Primary Care Provider: Marty Rodríguez PA-C        Subjective:     Principal Problem:Abnormal MRI, lumbar spine        HPI:  Catalino Mcfadden is a 59 y/o M with past medical history significant for HTN, ESRD on HD, DM2, gastroparesis, diabetic foot ulcer, chronic hiccups, and HLD who presented to the ED for with episodic dizziness, vomiting, and lower extremity weakness following a fall x 1 day. He reports nausea has been resolved with Zofran. He denies chest pain, shortness of breath, fever, chills, abdominal pain, headaches, light-headedness, numbness, tingling, or LOC. He reports he missed his Saturday hemodialysis treatment but states last week he received HD Tues, Wed, Thurs, during hospitaliztion. He was recently discharged 12/07. Lab work reflective of ESRD with elevated bili, ALP, and AST. In the ED blood pressure 200s/100s, received IV and PO antihypertensive medication with mild improvemnt. He states he did not take his medication yesterday due to N/V. During examination patient states he is unable to lift his lower extremities, however, was able to walk into the ED with assistance from a friend. CT Head negative. Admitted to hospital medicine for hypertensive emergency evaluation and treatment with nephrology and neurology consults placed.                     Overview/Hospital Course:  Patient presented with hypertensive emergency not taking medication for blood pressure.  He reports nausea which has resolved in early morning he was able to tolerate breakfast.  Patient had also reported a fall at home a few days prior to presentation.  MRI lumbar spine obtained that showed "1. Multiple scattered osseous lesions throughout the visualized lumbosacral spine and iliac bones, " "nonspecific, but given findings on comparison chest CT from 03/22/2023 are concerning for metastatic disease.  Further evaluation recommended.   2. Mild multilevel degenerative change of the lumbar spine, as detailed above, most pronounced at L3-4 through L5-S1 and resulting in mild neural foraminal stenosis.  No significant spinal canal stenosis at any lumbar level"     Neurology following - workup ongoing. Nephrology following - HD. Oncology - workup ongoing (abnormal lesion vs multiple myelomal - Notes if myeloma workup is positive he will need bone marrow biopsy. Palliative care consult code status    INTERVAL HISTORY: weakness - spinal lesions concerning for metastatic disease - impaired gait - PT/OT following recommends moderate intensity therapy      Past Medical History:   Diagnosis Date    Diabetes mellitus, type 2     Diabetic foot ulcer associated with diabetes mellitus due to underlying condition 07/16/2020    ESRD on hemodialysis     Hyperlipidemia     Hypertension     Obese        Past Surgical History:   Procedure Laterality Date    AV FISTULA PLACEMENT Left 07/06/2023    Procedure: CREATION, AV FISTULA;  Surgeon: Daniel Poon MD;  Location: [facility] OR;  Service: Vascular;  Laterality: Left;  RN PREOP 6/28/2023  LABS DAY OF SURGERY    BONE BIOPSY Left 07/17/2020    Procedure: BIOPSY, BONE;  Surgeon: Verona Whitmore DPM;  Location: [facility] OR;  Service: Podiatry;  Laterality: Left;    CERVICAL DISC SURGERY  07/11/2016    DEBRIDEMENT Left 07/17/2020    Procedure: DEBRIDEMENT;  Surgeon: Verona Whitmore DPM;  Location: [facility] OR;  Service: Podiatry;  Laterality: Left;    DEBRIDEMENT OF FOOT Right     toes & plantar surface    ESOPHAGEAL MANOMETRY WITH MEASUREMENT OF IMPEDANCE N/A 03/27/2023    Procedure: MANOMETRY, ESOPHAGUS, WITH IMPEDANCE MEASUREMENT;  Surgeon: Roopa Pérez MD;  Location: [facility] ([address] [place] [address]);  Service: Endoscopy;  Laterality: N/A;  Belching and rumination protocol " please  instructions via portal - sm    ESOPHAGOGASTRODUODENOSCOPY N/A 12/16/2022    Procedure: EGD (ESOPHAGOGASTRODUODENOSCOPY);  Surgeon: Eren Francois MD;  Location: [facility] ENDO;  Service: Endoscopy;  Laterality: N/A;  Pt. on Dialysis. K+ ordered prior to procedure.EC    ESOPHAGOGASTRODUODENOSCOPY N/A 12/5/2023    Procedure: EGD (ESOPHAGOGASTRODUODENOSCOPY);  Surgeon: Kash Johnston MD;  Location: [facility] ENDO;  Service: Endoscopy;  Laterality: N/A;    FRACTURE SURGERY Right     medial ankle, metal plate present    INSERTION OF TUNNELED CENTRAL VENOUS CATHETER (CVC) WITH SUBCUTANEOUS PORT N/A 06/11/2021    Procedure: TUNNEL CATH INSERTION WITHOUT PORT;  Surgeon: Jose Manuel Diagnostic Provider;  Location: [facility] OR;  Service: Radiology;  Laterality: N/A;  11AM START---PHONE PREOP 6/10/21---COVID POSTIVE ON 7/2020---NO S/S    left leg orthopedic surgery Left     UPPER GASTROINTESTINAL ENDOSCOPY         Review of patient's allergies indicates:  No Known Allergies    No current facility-administered medications on file prior to encounter.     Current Outpatient Medications on File Prior to Encounter   Medication Sig    hydrALAZINE (APRESOLINE) 100 MG tablet Take 1 tablet (100 mg total) by mouth 3 (three) times daily.    NIFEdipine (PROCARDIA XL) 90 MG (OSM) 24 hr tablet Take 1 tablet (90 mg total) by mouth once daily.    promethazine (PHENERGAN) 12.5 MG Tab Take 1 tablet (12.5 mg total) by mouth every 6 (six) hours as needed (nausea and vomiting).    amitriptyline (ELAVIL) 10 MG tablet Take 1 tablet (10 mg total) by mouth every evening. (Patient not taking: Reported on 12/12/2023)    GAVILYTE-C 240-22.72-6.72 -5.84 gram SolR Take 4,000 mLs by mouth once.    heparin sod,pork in 0.45% NaCl (HEPARIN, PORCINE, 100 UNITS) 100 unit/100 mL (1 unit/mL) SolP in sodium chloride 0.45 % 100 mL infusion Inject 1 mL/hr into the vein continuous.    methoxy peg-epoetin beta (MIRCERA INJ) Inject 50 mcg into the skin every 28 days.     sevelamer carbonate (RENVELA) 800 mg Tab Take 1 tablet (800 mg total) by mouth 3 (three) times daily with meals. (Patient not taking: Reported on 12/12/2023)    sucroferric oxyhydroxide (VELPHORO) 500 mg Chew Take 1 tablet by mouth once daily.    torsemide (DEMADEX) 20 MG Tab Take 1 tablet by mouth once daily.     Family History       Problem Relation (Age of Onset)    Heart disease Father          Tobacco Use    Smoking status: Never    Smokeless tobacco: Never   Substance and Sexual Activity    Alcohol use: Not Currently     Comment: occ rare beer    Drug use: No    Sexual activity: Not Currently     Review of Systems   Constitutional:  Positive for activity change and fatigue. Negative for appetite change, chills, diaphoresis and fever.   HENT: Negative.     Eyes: Negative.    Respiratory:  Negative for cough, shortness of breath and wheezing.    Cardiovascular:  Negative for chest pain and leg swelling.   Gastrointestinal:  Negative for abdominal distention, abdominal pain, constipation, diarrhea, nausea and vomiting.   Endocrine: Negative.    Genitourinary:  Negative for difficulty urinating and dysuria.   Musculoskeletal:  Negative for arthralgias, back pain and myalgias.   Neurological:  Positive for weakness (bilateral lower extremity). Negative for syncope, facial asymmetry, speech difficulty, light-headedness, numbness and headaches.   Hematological: Negative.    Psychiatric/Behavioral: Negative.       Objective:     Vital Signs (Most Recent):  Temp: 98.2 °F (36.8 °C) (12/13/23 0723)  Pulse: 77 (12/13/23 0847)  Resp: 18 (12/13/23 0847)  BP: (!) 148/68 (12/13/23 0723)  SpO2: 97 % (12/13/23 0847) Vital Signs (24h Range):  Temp:  [97.7 °F (36.5 °C)-98.2 °F (36.8 °C)] 98.2 °F (36.8 °C)  Pulse:  [73-83] 77  Resp:  [18-19] 18  SpO2:  [96 %-99 %] 97 %  BP: (135-161)/(64-84) 148/68     Weight: 91.1 kg (200 lb 13.4 oz)  Body mass index is 26.5 kg/m².     Physical Exam  Vitals and nursing note reviewed.    Constitutional:       General: He is not in acute distress.     Appearance: Normal appearance. He is not toxic-appearing.   Cardiovascular:      Rate and Rhythm: Normal rate and regular rhythm.      Heart sounds: No murmur heard.     No gallop.   Pulmonary:      Effort: Pulmonary effort is normal. No respiratory distress.      Breath sounds: Normal breath sounds. No wheezing.   Abdominal:      General: Bowel sounds are normal. There is no distension.      Palpations: Abdomen is soft.      Tenderness: There is no abdominal tenderness.   Skin:     General: Skin is warm and dry.      Capillary Refill: Capillary refill takes less than 2 seconds.      Comments: Left upper arm AV Fistula noted with positive bruit and thrill   Neurological:      Mental Status: He is alert and oriented to person, place, and time. Mental status is at baseline.      GCS: GCS eye subscore is 4. GCS verbal subscore is 5. GCS motor subscore is 6.      Sensory: Sensation is intact.      Motor: Weakness (bilateral lower extremity) present.      Comments: Reports unable to lift legs against gravity due to weakness   Psychiatric:         Mood and Affect: Mood normal.                Significant Labs: All pertinent labs within the past 24 hours have been reviewed.  BMP:   Recent Labs   Lab 12/13/23 0412   GLU 84   *   K 4.6   CL 97   CO2 24   BUN 35*   CREATININE 7.9*   CALCIUM 9.1   MG 2.2       CBC:   Recent Labs   Lab 12/11/23  1728 12/12/23 0414 12/13/23 0412   WBC 6.86 4.94 4.45   HGB 12.6* 11.1* 11.4*   HCT 38.8* 34.5* 35.3*    212 183       CMP:   Recent Labs   Lab 12/11/23  1728 12/12/23 0414 12/13/23 0412   * 134* 133*   K 4.7 4.1 4.6    102 97   CO2 19* 16* 24    158* 84   BUN 53* 55* 35*   CREATININE 9.3* 9.6* 7.9*   CALCIUM 10.2 9.3 9.1   PROT 8.5* 7.1 7.3   ALBUMIN 3.0* 2.5* 2.6*   BILITOT 1.5* 1.2* 1.4*   ALKPHOS 854* 693* 705*   AST 64* 48* 50*   ALT 30 25 28   ANIONGAP 15 16 12         Significant  Imaging:     Imaging Results              CT Head Without Contrast (Final result)  Result time 12/12/23 07:46:24      Final result by Jj Serna MD (12/12/23 07:46:24)                   Impression:      No evidence of an acute intracranial abnormality.    Final read.      Electronically signed by: Jj Serna  Date:    12/12/2023  Time:    07:46               Narrative:    EXAMINATION:  CT HEAD WITHOUT CONTRAST    CLINICAL HISTORY:  Dizziness, persistent/recurrent, cardiac or vascular cause suspected;    TECHNIQUE:  Low dose axial images were obtained through the head.  Coronal and sagittal reformations were also performed. Contrast was not administered.    COMPARISON:  02/22/2023    FINDINGS:  Ventricles and sulci are normal in size for age without evidence of hydrocephalus.    Mild scattered hypoattenuation within the supratentorial white matter, nonspecific but probably reflecting sequelae of chronic microvascular ischemic change.   No evidence of acute parenchymal hemorrhage, edema, significant mass effect or major vascular distribution infarct.    No extra-axial blood or fluid collections.    No displaced calvarial fracture.    The mastoid air cells and visualized paranasal sinuses are essentially clear.    Calcific atherosclerosis of the internal carotid and vertebral arteries at the skull base.                                       X-Ray Chest AP Portable (Final result)  Result time 12/12/23 08:31:55      Final result by Angy Beckford MD (12/12/23 08:31:55)                   Impression:      Improvement in the appearance of the chest with decrease of lung infiltrates suggesting improving pulmonary edema      Electronically signed by: Angy Beckford MD  Date:    12/12/2023  Time:    08:31               Narrative:    EXAMINATION:  XR CHEST AP PORTABLE    CLINICAL HISTORY:  Dizziness and giddiness    TECHNIQUE:  Single frontal view of the chest was  performed.    COMPARISON:  12/04/2023    FINDINGS:  Stable cardiomediastinal silhouette.  Appear mildly enlarged as before.  Right-sided internal jugular venous catheter remains in place with the distal tip at the level of the caval atrial junction.  Prominent lung markings suggesting mild pulmonary vascular distention with this is decreased compared to the prior exam.  No pleural effusion                                        Assessment/Plan:      * Abnormal MRI, lumbar spine  Concerning MRI spine for metas tic disease  Ongoing workup Onclology, Neurology, and Nephrology following  Neurosurgery consulted and following paraesthesias   PT/OT      Hypertensive emergency  Patient has a current diagnosis of Hypertensive emergency with end organ damage evidenced by acute kidney injury which is uncontrolled.  Latest blood pressure and vitals reviewed-   Temp:  [97.3 °F (36.3 °C)-98.4 °F (36.9 °C)]   Pulse:  [66-92]   Resp:  [16-18]   BP: (135-262)/()   SpO2:  [93 %-100 %] .   Patient currently on IV antihypertensives.   Home meds for hypertension were reviewed and noted below.   Hypertension Medications               hydrALAZINE (APRESOLINE) 100 MG tablet Take 1 tablet (100 mg total) by mouth 3 (three) times daily.    NIFEdipine (PROCARDIA XL) 90 MG (OSM) 24 hr tablet Take 1 tablet (90 mg total) by mouth once daily.    torsemide (DEMADEX) 20 MG Tab Take 1 tablet by mouth once daily.              Will aim for controlled BP reduction by medications noted above. Monitor and mitigate end organ damage as indicated.  BP improved with re-introducing home meds  Follow BP trends and adjust as warranted    ESRD on hemodialysis  Creatine stable for now. BMP reviewed- noted Estimated Creatinine Clearance: 11.4 mL/min (A) (based on SCr of 7.9 mg/dL (H)). according to latest data. Based on current GFR, CKD stage is end stage.  Monitor UOP and serial BMP and adjust therapy as needed. Renally dose meds. Avoid nephrotoxic  "medications and procedures.  -AV fistula noted without complications  -dialysis ongoing  -nephrology following    Lower extremity weakness  Possibly due to hypertensive emergency; patient states unable to lift legs against gravity  S/p fall at home due to bilateral lower extremity weakness  CT head negative  Neurology consult placed      Serum total bilirubin elevated  Chronic condition noted  Monitor bmp    Intractable hiccups  Chronic condition  Continue prn thorazine       Type 2 diabetes mellitus with stage 5 chronic kidney disease  Patient's FSGs are controlled on current medication regimen.  Last A1c reviewed-   Lab Results   Component Value Date    HGBA1C 5.1 02/22/2023     Most recent fingerstick glucose reviewed- No results for input(s): "POCTGLUCOSE" in the last 24 hours.  Current correctional scale  Medium  Maintain anti-hyperglycemic dose as follows-   Antihyperglycemics (From admission, onward)      Start     Stop Route Frequency Ordered    12/12/23 0054  insulin aspart U-100 pen 0-10 Units         -- SubQ Before meals & nightly PRN 12/12/23 0054          Hold Oral hypoglycemics while patient is in the hospital.      VTE Risk Mitigation (From admission, onward)           Ordered     heparin (porcine) injection 5,000 Units  Every 8 hours         12/12/23 0054     heparin (porcine) injection 5,000 Units  As needed (PRN)         12/12/23 0056     IP VTE HIGH RISK PATIENT  Once         12/12/23 0054     Place sequential compression device  Until discontinued         12/12/23 0054                    Discharge Planning   WILMER:      Code Status: Full Code   Is the patient medically ready for discharge?:     Reason for patient still in hospital (select all that apply): Patient trending condition, Treatment, Imaging, Consult recommendations, and PT / OT recommendations  Discharge Plan A: Home   Discharge Delays: None known at this time              SOHAM Jones  Department of Hospital Medicine   [place] " [facility]/Surg

## 2023-12-13 NOTE — HPI
Mr. Catalino Mcfadden is a 58-year-old male with past medical history of type 2 diabetes, neuropathy, end-stage renal failure treated with hemodialysis, status post C5-7 ACDF, AV fistula left upper arm, hypertension, hyperlipidemia presented to the emergency department on 12/11/2023 due to dizziness, vomiting, and a fall secondary to bilateral leg weakness.  During ED evaluation, patient's blood pressure was 232/110 and he was subsequently admitted due to hypertensive emergency.  MRI lumbar spine without contrast ordered due to fall which was most significant for multiple scattered osseous lesions throughout the visualized lumbosacral spine and iliac bones which are concerning for metastatic disease.  These findings were not found on cervical or thoracic vertebral bodies.    During encounter, patient was found sitting at bedside in chair.  He reports approximately 3 days ago he was walking to his car and fell. He had difficulty pulling his self up but did eventually got into car and drove off.  He reported once he got home he had concerns about falling again and his roommate prompted him to go to the emergency department.  He reports bilateral lower extremity weakness for approximately 6 months.  Right leg weakness greater than left leg.  He also reports right anterior forearm numbness for the last 6 months.  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  Denies usage of walker, recurrent falls, imbalance, bladder or bowel incontinence, upper or lower extremity radicular pain, neck pain or low back pain.    Neurosurgery consulted.

## 2023-12-13 NOTE — SUBJECTIVE & OBJECTIVE
INTERVAL HISTORY: weakness - spinal lesions concerning for metastatic disease - impaired gait - PT/OT following recommends moderate intensity therapy      Past Medical History:   Diagnosis Date    Diabetes mellitus, type 2     Diabetic foot ulcer associated with diabetes mellitus due to underlying condition 07/16/2020    ESRD on hemodialysis     Hyperlipidemia     Hypertension     Obese        Past Surgical History:   Procedure Laterality Date    AV FISTULA PLACEMENT Left 07/06/2023    Procedure: CREATION, AV FISTULA;  Surgeon: Daniel Poon MD;  Location: [facility] OR;  Service: Vascular;  Laterality: Left;  RN PREOP 6/28/2023  LABS DAY OF SURGERY    BONE BIOPSY Left 07/17/2020    Procedure: BIOPSY, BONE;  Surgeon: Verona Whitmore DPM;  Location: [facility] OR;  Service: Podiatry;  Laterality: Left;    CERVICAL DISC SURGERY  07/11/2016    DEBRIDEMENT Left 07/17/2020    Procedure: DEBRIDEMENT;  Surgeon: Verona Whitmore DPM;  Location: [facility] OR;  Service: Podiatry;  Laterality: Left;    DEBRIDEMENT OF FOOT Right     toes & plantar surface    ESOPHAGEAL MANOMETRY WITH MEASUREMENT OF IMPEDANCE N/A 03/27/2023    Procedure: MANOMETRY, ESOPHAGUS, WITH IMPEDANCE MEASUREMENT;  Surgeon: Roopa Pérez MD;  Location: [facility] (4TH FLR);  Service: Endoscopy;  Laterality: N/A;  Belching and rumination protocol please  instructions via portal - sm    ESOPHAGOGASTRODUODENOSCOPY N/A 12/16/2022    Procedure: EGD (ESOPHAGOGASTRODUODENOSCOPY);  Surgeon: Eren Francois MD;  Location: [facility];  Service: Endoscopy;  Laterality: N/A;  Pt. on Dialysis. K+ ordered prior to procedure.EC    ESOPHAGOGASTRODUODENOSCOPY N/A 12/5/2023    Procedure: EGD (ESOPHAGOGASTRODUODENOSCOPY);  Surgeon: Kash Johnston MD;  Location: [facility];  Service: Endoscopy;  Laterality: N/A;    FRACTURE SURGERY Right     medial ankle, metal plate present    INSERTION OF TUNNELED CENTRAL VENOUS CATHETER (CVC) WITH SUBCUTANEOUS PORT N/A 06/11/2021     Procedure: TUNNEL CATH INSERTION WITHOUT PORT;  Surgeon: Jose Manuel Diagnostic Provider;  Location: [facility];  Service: Radiology;  Laterality: N/A;  11AM START---PHONE PREOP 6/10/21---COVID POSTIVE ON 7/2020---NO S/S    left leg orthopedic surgery Left     UPPER GASTROINTESTINAL ENDOSCOPY         Review of patient's allergies indicates:  No Known Allergies    No current facility-administered medications on file prior to encounter.     Current Outpatient Medications on File Prior to Encounter   Medication Sig    hydrALAZINE (APRESOLINE) 100 MG tablet Take 1 tablet (100 mg total) by mouth 3 (three) times daily.    NIFEdipine (PROCARDIA XL) 90 MG (OSM) 24 hr tablet Take 1 tablet (90 mg total) by mouth once daily.    promethazine (PHENERGAN) 12.5 MG Tab Take 1 tablet (12.5 mg total) by mouth every 6 (six) hours as needed (nausea and vomiting).    amitriptyline (ELAVIL) 10 MG tablet Take 1 tablet (10 mg total) by mouth every evening. (Patient not taking: Reported on 12/12/2023)    GAVILYTE-C 240-22.72-6.72 -5.84 gram SolR Take 4,000 mLs by mouth once.    heparin sod,pork in 0.45% NaCl (HEPARIN, PORCINE, 100 UNITS) 100 unit/100 mL (1 unit/mL) SolP in sodium chloride 0.45 % 100 mL infusion Inject 1 mL/hr into the vein continuous.    methoxy peg-epoetin beta (MIRCERA INJ) Inject 50 mcg into the skin every 28 days.    sevelamer carbonate (RENVELA) 800 mg Tab Take 1 tablet (800 mg total) by mouth 3 (three) times daily with meals. (Patient not taking: Reported on 12/12/2023)    sucroferric oxyhydroxide (VELPHORO) 500 mg Chew Take 1 tablet by mouth once daily.    torsemide (DEMADEX) 20 MG Tab Take 1 tablet by mouth once daily.     Family History       Problem Relation (Age of Onset)    Heart disease Father          Tobacco Use    Smoking status: Never    Smokeless tobacco: Never   Substance and Sexual Activity    Alcohol use: Not Currently     Comment: occ rare beer    Drug use: No    Sexual activity: Not Currently     Review of  Systems   Constitutional:  Positive for activity change and fatigue. Negative for appetite change, chills, diaphoresis and fever.   HENT: Negative.     Eyes: Negative.    Respiratory:  Negative for cough, shortness of breath and wheezing.    Cardiovascular:  Negative for chest pain and leg swelling.   Gastrointestinal:  Negative for abdominal distention, abdominal pain, constipation, diarrhea, nausea and vomiting.   Endocrine: Negative.    Genitourinary:  Negative for difficulty urinating and dysuria.   Musculoskeletal:  Negative for arthralgias, back pain and myalgias.   Neurological:  Positive for weakness (bilateral lower extremity). Negative for syncope, facial asymmetry, speech difficulty, light-headedness, numbness and headaches.   Hematological: Negative.    Psychiatric/Behavioral: Negative.       Objective:     Vital Signs (Most Recent):  Temp: 98.2 °F (36.8 °C) (12/13/23 0723)  Pulse: 77 (12/13/23 0847)  Resp: 18 (12/13/23 0847)  BP: (!) 148/68 (12/13/23 0723)  SpO2: 97 % (12/13/23 0847) Vital Signs (24h Range):  Temp:  [97.7 °F (36.5 °C)-98.2 °F (36.8 °C)] 98.2 °F (36.8 °C)  Pulse:  [73-83] 77  Resp:  [18-19] 18  SpO2:  [96 %-99 %] 97 %  BP: (135-161)/(64-84) 148/68     Weight: 91.1 kg (200 lb 13.4 oz)  Body mass index is 26.5 kg/m².     Physical Exam  Vitals and nursing note reviewed.   Constitutional:       General: He is not in acute distress.     Appearance: Normal appearance. He is not toxic-appearing.   Cardiovascular:      Rate and Rhythm: Normal rate and regular rhythm.      Heart sounds: No murmur heard.     No gallop.   Pulmonary:      Effort: Pulmonary effort is normal. No respiratory distress.      Breath sounds: Normal breath sounds. No wheezing.   Abdominal:      General: Bowel sounds are normal. There is no distension.      Palpations: Abdomen is soft.      Tenderness: There is no abdominal tenderness.   Skin:     General: Skin is warm and dry.      Capillary Refill: Capillary refill takes  less than 2 seconds.      Comments: Left upper arm AV Fistula noted with positive bruit and thrill   Neurological:      Mental Status: He is alert and oriented to person, place, and time. Mental status is at baseline.      GCS: GCS eye subscore is 4. GCS verbal subscore is 5. GCS motor subscore is 6.      Sensory: Sensation is intact.      Motor: Weakness (bilateral lower extremity) present.      Comments: Reports unable to lift legs against gravity due to weakness   Psychiatric:         Mood and Affect: Mood normal.                Significant Labs: All pertinent labs within the past 24 hours have been reviewed.  BMP:   Recent Labs   Lab 12/13/23 0412   GLU 84   *   K 4.6   CL 97   CO2 24   BUN 35*   CREATININE 7.9*   CALCIUM 9.1   MG 2.2       CBC:   Recent Labs   Lab 12/11/23 1728 12/12/23 0414 12/13/23 0412   WBC 6.86 4.94 4.45   HGB 12.6* 11.1* 11.4*   HCT 38.8* 34.5* 35.3*    212 183       CMP:   Recent Labs   Lab 12/11/23 1728 12/12/23 0414 12/13/23 0412   * 134* 133*   K 4.7 4.1 4.6    102 97   CO2 19* 16* 24    158* 84   BUN 53* 55* 35*   CREATININE 9.3* 9.6* 7.9*   CALCIUM 10.2 9.3 9.1   PROT 8.5* 7.1 7.3   ALBUMIN 3.0* 2.5* 2.6*   BILITOT 1.5* 1.2* 1.4*   ALKPHOS 854* 693* 705*   AST 64* 48* 50*   ALT 30 25 28   ANIONGAP 15 16 12         Significant Imaging:     Imaging Results              CT Head Without Contrast (Final result)  Result time 12/12/23 07:46:24      Final result by Jj Serna MD (12/12/23 07:46:24)                   Impression:      No evidence of an acute intracranial abnormality.    Final read.      Electronically signed by: Jj Serna  Date:    12/12/2023  Time:    07:46               Narrative:    EXAMINATION:  CT HEAD WITHOUT CONTRAST    CLINICAL HISTORY:  Dizziness, persistent/recurrent, cardiac or vascular cause suspected;    TECHNIQUE:  Low dose axial images were obtained through the head.  Coronal and sagittal reformations were also  performed. Contrast was not administered.    COMPARISON:  02/22/2023    FINDINGS:  Ventricles and sulci are normal in size for age without evidence of hydrocephalus.    Mild scattered hypoattenuation within the supratentorial white matter, nonspecific but probably reflecting sequelae of chronic microvascular ischemic change.   No evidence of acute parenchymal hemorrhage, edema, significant mass effect or major vascular distribution infarct.    No extra-axial blood or fluid collections.    No displaced calvarial fracture.    The mastoid air cells and visualized paranasal sinuses are essentially clear.    Calcific atherosclerosis of the internal carotid and vertebral arteries at the skull base.                                       X-Ray Chest AP Portable (Final result)  Result time 12/12/23 08:31:55      Final result by Angy Beckford MD (12/12/23 08:31:55)                   Impression:      Improvement in the appearance of the chest with decrease of lung infiltrates suggesting improving pulmonary edema      Electronically signed by: Angy Beckford MD  Date:    12/12/2023  Time:    08:31               Narrative:    EXAMINATION:  XR CHEST AP PORTABLE    CLINICAL HISTORY:  Dizziness and giddiness    TECHNIQUE:  Single frontal view of the chest was performed.    COMPARISON:  12/04/2023    FINDINGS:  Stable cardiomediastinal silhouette.  Appear mildly enlarged as before.  Right-sided internal jugular venous catheter remains in place with the distal tip at the level of the caval atrial junction.  Prominent lung markings suggesting mild pulmonary vascular distention with this is decreased compared to the prior exam.  No pleural effusion

## 2023-12-13 NOTE — ASSESSMENT & PLAN NOTE
Mr. Catalino Mcfadden is a 58-year-old male with past medical history of type 2 diabetes, neuropathy, end-stage renal failure treated with hemodialysis, status post C5-7 ACDF, AV fistula left upper arm, hypertension, hyperlipidemia presented to the emergency department on 12/11/2023 due to dizziness, vomiting, and a fall secondary to bilateral leg weakness.      Neurologically intact.    --Patient admitted to Hospital Medicine on telemetry      -q4h neurochecks on floor  --PT/OT/OOB  --TEDs/SCDs  --Continue to monitor clinically, notify NSGY immediately with any changes in neuro status    Dispo:  Additional recommendations to follow.

## 2023-12-13 NOTE — ASSESSMENT & PLAN NOTE
Concerning MRI spine for metas tic disease  Ongoing workup Onclology, Neurology, and Nephrology following  Neurosurgery consulted and following paraesthesias   PT/OT

## 2023-12-13 NOTE — PT/OT/SLP PROGRESS
Physical Therapy Treatment    Patient Name:  Catalino Mcfadden   MRN:  [id number]    Recommendations:     Discharge Recommendations: High Intensity Therapy  Discharge Equipment Recommendations: other (see comments) (TBD)  Barriers to discharge: None    Assessment:     Catalino Mcfadden is a 58 y.o. male admitted with a medical diagnosis of Abnormal MRI, spine.  He presents with the following impairments/functional limitations: weakness, impaired endurance, impaired functional mobility, gait instability, impaired balance, decreased lower extremity function, decreased ROM .  Patient agreeable to PT treatment this morning and appeared stronger then yesterday. Patient presented supine in bed and required min assist to transfer to sitting and then min assist to stand.  Patient then able to stand x 45 seconds, x 90 seconds RW CGA.  Patient then able to ambulate x 250 feet RW min assist due to balance issues so patient remains a fall risk with mobility.     Rehab Prognosis: Good; patient would benefit from acute skilled PT services to address these deficits and reach maximum level of function.    Recent Surgery: * No surgery found *      Plan:     During this hospitalization, patient to be seen daily to address the identified rehab impairments via gait training, therapeutic activities, therapeutic exercises and progress toward the following goals:    Plan of Care Expires:  01/17/24    Subjective     Chief Complaint: weakness  Patient/Family Comments/goals: none given  Pain/Comfort:         Objective:     Communicated with nurse prior to session.  Patient found supine with bed alarm upon PT entry to room.     General Precautions: Standard, fall  Orthopedic Precautions: N/A  Braces:    Respiratory Status: Room air     Functional Mobility:  Bed Mobility:     Supine to Sit: minimum assistance  Transfers:     Sit to Stand:  minimum assistance with rolling walker  Gait: x 250 feet RW min assist      AM-PAC 6 CLICK MOBILITY          Treatment &  Education:  Transfer training supine to sit min, sit to stand x 3 times RW min  Standing tolerance/balance x 45 seconds, x 90 seconds RW CGA  Exercise sitting EOB to include toe raises, LAQ, hip flexion, isometric hip abd and isometric hip add.  All done bilateral LE x 10 reps.  Gait training x 250 feet rW min assist due to balance issues with patient still a significant fall risk    Patient left up in chair with call button in reach, chair alarm on, and nurse notified..    GOALS:   Multidisciplinary Problems       Physical Therapy Goals          Problem: Physical Therapy    Goal Priority Disciplines Outcome Goal Variances Interventions   Physical Therapy Goal     PT, PT/OT Ongoing, Progressing     Description: Goals to be met by: 23     Patient will increase functional independence with mobility by performin. Supine to sit with Stand-by Assistance  2. Sit to supine with Stand-by Assistance  3. Sit to stand transfer with Contact Guard Assistance  4. Bed to chair transfer with Contact Guard Assistance using Rolling Walker  5. Gait  x 25 feet with Contact Guard Assistance using Rolling Walker.                          Time Tracking:     PT Received On: 23  PT Start Time: 08     PT Stop Time: 910  PT Total Time (min): 39 min     Billable Minutes: Gait Training 15, Therapeutic Activity 15, and Therapeutic Exercise 9    Treatment Type: Treatment  PT/PTA: PT     Number of PTA visits since last PT visit: 0     2023

## 2023-12-13 NOTE — ASSESSMENT & PLAN NOTE
Creatine stable for now. BMP reviewed- noted Estimated Creatinine Clearance: 11.4 mL/min (A) (based on SCr of 7.9 mg/dL (H)). according to latest data. Based on current GFR, CKD stage is end stage.  Monitor UOP and serial BMP and adjust therapy as needed. Renally dose meds. Avoid nephrotoxic medications and procedures.  -AV fistula noted without complications  -dialysis ongoing  -nephrology following

## 2023-12-13 NOTE — PROGRESS NOTES
[facility]  Neurology Progress Note    Patient Name: Catalino Mcfadden  MRN: [id number]  : 1965  TODAY'S DATE: 2023  ADMIT DATE: 2023  5:29 PM                                          CONSULTED PROVIDER: Patricia Nguyen MD, Neurologist. On-call Phone: [phone]  CONSULT REQUESTED BY: Judy Lane MD     Chief Complaint   Patient presents with    Dizziness    Vomiting    Fall     For several days        HPI per EMR:   Catalino Mcfadden is a 59 y/o M with past medical history significant for HTN, ESRD on HD, DM2, gastroparesis, diabetic foot ulcer, chronic hiccups, and HLD who presented to the ED for with episodic dizziness, vomiting, and lower extremity weakness following a fall x 1 day. He reports nausea has been resolved with Zofran. He denies chest pain, shortness of breath, fever, chills, abdominal pain, headaches, light-headedness, numbness, tingling, or LOC. He reports he missed his Saturday hemodialysis treatment but states last week he received HD Tues, Wed, Thurs, during hospitaliztion. He was recently discharged . Lab work reflective of ESRD with elevated bili, ALP, and AST. In the ED blood pressure 200s/100s, received IV and PO antihypertensive medication with mild improvemnt. He states he did not take his medication yesterday due to N/V. During examination patient states he is unable to lift his lower extremities, however, was able to walk into the ED with assistance from a friend. CT Head negative. Admitted to hospital medicine for hypertensive emergency evaluation and treatment with nephrology and neurology consults placed.      Neurology Consult:  Patient was seen and examined by me today. He is a 57 yo man with history of HTN, ESRD on HD, DM2, gastroparesis, diabetic foot ulcer, chronic hiccups, and HLD who presented to the ED with complaints of generalized weakness, episodes of dizziness, nausea and vomiting. He states that about 6 months ago he began noticing  that his legs were not as strong as before, but he was still able to ambulate unassisted. However, for the past few weeks, he has become weaker to the point that he is no longer able to ambulate unassisted. He also complains of right arm numbness and weakness for the past few weeks. His blood pressure was elevated, but he reports that he had issues with his medications due to the persistent nausea and vomiting. Neurology consulted for evaluation of weakness.    12/13/23: Patient was seen and examined by me today. He reports that he feels stronger today, and he denies any new neuro deficits.       Scheduled Meds:   amitriptyline  10 mg Oral QHS    heparin (porcine)  5,000 Units Subcutaneous Q8H    hydrALAZINE  100 mg Oral TID    NIFEdipine  90 mg Oral Daily    pantoprazole  40 mg Oral Daily    sodium chloride 0.9%  10 mL Intravenous Q8H     Continuous Infusions:  PRN Meds:.sodium chloride 0.9%, acetaminophen, albuterol-ipratropium, aluminum-magnesium hydroxide-simethicone, chlorproMAZINE, glucagon (human recombinant), glucose, glucose, heparin (porcine), insulin aspart U-100, melatonin, naloxone, ondansetron, prochlorperazine, senna-docusate 8.6-50 mg, sodium chloride 0.9%      Physical Exam  Current Vitals:  Vitals:    12/13/23 0723   BP: (!) 148/68   Pulse: 73   Resp: 18   Temp: 98.2 °F (36.8 °C)       Physical Exam:  General: AO x3  HEENT: PERRL, EOMI  CV: RRR  Lungs: no respiratory distress  Abdomen: soft    Neurological Exam    MENTAL STATUS EXAM:  Level of alertness: Alert  Level of attention: Attentive w/out deficit  Orientation/Awareness: intact to person, place, time, situation  Language: fluent. Comprehension/repetition/naming intact    CRANIAL NERVE EXAM:  II/III: fundoscopic exam deferred, PERRL; visual fields full to confrontation  III/IV/VI: EOMI w/out evidence of nystagmus, strabismus, or evoked diplopia  V: no deficits appreciated to light touch  VII: no facial asymmetry noted  VIII: no deficits in  "hearing bilaterally  IX/X: palate @ ML and raises symmetrically  XI: shoulder shrug 5/5 bilaterally  XII: tongue to midline w/out asymmetry  No dysarthria noted on exam.    MOTOR EXAM:  Bulk and Tone: decreased bulk, normal tone.  Strength is 5/5 in bilateral upper extremities 4+/5 in bilateral lower extremities    REFLEXES:  2+ in bilateral upper and lower extremities except right patellar 1+ and absent ankles.    SENSORY EXAM  Decreased sensation in the right upper extremity, and both feet. Rest intact.    COORDINATION/CEREBELLAR EXAM:  FTN: no dysmetria or other signs of appendicular ataxia  HTS: unable to assess    GAIT:  Deferred for safety.    Laboratory Data & Studies    Recent Labs   Lab 12/11/23 1728 12/12/23 0414 12/13/23 0412   WBC 6.86 4.94 4.45   HGB 12.6* 11.1* 11.4*    212 183   MCV 88 90 90         Recent Labs   Lab 12/07/23  0452 12/11/23 1728 12/12/23 0414 12/13/23 0412   * 135* 134* 133*   K 4.1 4.7 4.1 4.6   CL 99 101 102 97   CO2 23 19* 16* 24   BUN 16 53* 55* 35*   GLU 73 108 158* 84   CALCIUM 8.7 10.2 9.3 9.1   MG 1.8  --  2.2 2.2   PHOS 2.9  --   --   --          Recent Labs   Lab 12/11/23 1728 12/12/23 0414 12/13/23 0412   PROT 8.5* 7.1 7.3   ALBUMIN 3.0* 2.5* 2.6*   BILITOT 1.5* 1.2* 1.4*   AST 64* 48* 50*   ALT 30 25 28   ALKPHOS 854* 693* 705*         No results for input(s): "LABPT", "INR", "APTT" in the last 168 hours.    No results for input(s): "HGBA1C", "CHOL", "TRIG", "LDLCALC", "HDL", "TSH" in the last 168 hours.      Microbiology:  Microbiology Results (last 7 days)       ** No results found for the last 168 hours. **              Imaging:  US Abdomen Complete    Result Date: 12/5/2023  EXAMINATION: US ABDOMEN COMPLETE CLINICAL HISTORY: elevated total bilirubin; TECHNIQUE: Complete abdominal ultrasound (including pancreas, aorta, liver, gallbladder, common bile duct, IVC, kidneys, and spleen) was performed. COMPARISON: 11/20/2020 FINDINGS: Pancreas: Obscured " by overlying bowel gas. Aorta: Obscured Liver: 17 cm, normal in size. Homogeneous parenchymal echotexture. No focal lesions. Gallbladder: Multiple gallstones are present measuring up to 3.5 cm.  No wall thickening or pericholecystic fluid.  Sonographic Logan sign is negative. Biliary system: 5.5 mm common bile duct.  No intrahepatic ductal dilatation. Inferior vena cava: Normal in appearance. Right kidney: 10.6 cm. No hydronephrosis. Left kidney: 10.8 cm. No hydronephrosis. Spleen: 14.8 cm.  Mildly enlarged. Miscellaneous: No ascites.     Cholelithiasis. Mild splenomegaly. Electronically signed by: James Arguello MD Date:    12/05/2023 Time:    09:22    X-Ray Chest AP Portable    Result Date: 12/4/2023  EXAMINATION: XR CHEST AP PORTABLE CLINICAL HISTORY: Hiccough TECHNIQUE: Single frontal view of the chest was performed. COMPARISON: 02/22/2023 FINDINGS: There is a right-sided internal jugular catheter with the tip in the superior vena cava.  There is patchy opacification of the right mid lung zone new from prior exam.     There is patchy opacification the right mid lung zone new from prior exam and concerning for developing pneumonia. Electronically signed by: Danisha Edgar MD Date:    12/04/2023 Time:    13:30        Assessment and Plan:    Generalized weakness  Recurrent falls   Dizziness, nausea and vomiting  Lumbar spine lesions - concern for metastatic disease or MM  59 yo man with history of HTN, ESRD on HD, DM2, gastroparesis, diabetic foot ulcer, chronic hiccups, and HLD who presented to the ED with complaints of generalized weakness, episodes of dizziness, nausea and vomiting for at least the past few weeks, now unable to ambulate unassisted.  His neuro exam does show right sided weakness and numbness, as well as RLE hyporreflexia concerning for radicular compression. MRI L-spine revealed osseous lesions concerning for metastatic disease or multiple myeloma, workup is ongoing. Brain MRI negative for  stroke.  - Admitted to hospital medicine with q4 hour neuro checks, on telemetry, continuous pulse oximetry  - Diet after eval per SLP  - MRI brain as above  - MRI L-spine concerning for osseous lesions, so C and T spine were ordered and showed no abnormalities in the vertebrae   - CT chest, abdomen and pelvis without contrast showed pulmonary micronodules improved from previous study and abnormal hepatic attenuation pattern thought to be hepatic congestion, but still possible to be malignancy.  - CK was low.  Ordered Voltage gated calcium channel antibodies (VGCC Abs) for Lambert-Eaton Myasthenic Syndrome as part of paraneoplastic autoantibody evaluation  - Maintain Euthermia with Tylenol prn temp > 37.2 degrees C.  - Assessment for rehab with PT/OT/SLP evaluation and treatment.  - workup and treatment of metabolic and infectious abnormalities as per primary team  - Oncology and neurosurgery evaluated patient, appreciate recommendations   - Patient may need EMG/nerve conduction studies after he is discharged  - Will follow along    DVT prophylaxis with chemo/SCD prophylaxis    [phone] within 7 days from discharge.       All questions were answered.                              Thank you kindly for including us in the care of this patient. Please do not hesitate to contact us with any questions.       48 minutes of care time has been spent evaluating with the patient. Time includes chart review not limited to diagnostic imaging, labs, and vitals, patient assessment, discussion with family and nursing, current order evaluations, and new order entries.      Patricia Nguyen MD  Neurology

## 2023-12-13 NOTE — SUBJECTIVE & OBJECTIVE
Medications Prior to Admission   Medication Sig Dispense Refill Last Dose    hydrALAZINE (APRESOLINE) 100 MG tablet Take 1 tablet (100 mg total) by mouth 3 (three) times daily. 90 tablet 0 12/11/2023    NIFEdipine (PROCARDIA XL) 90 MG (OSM) 24 hr tablet Take 1 tablet (90 mg total) by mouth once daily. 90 tablet 3 12/11/2023    promethazine (PHENERGAN) 12.5 MG Tab Take 1 tablet (12.5 mg total) by mouth every 6 (six) hours as needed (nausea and vomiting). 30 tablet 3 12/11/2023    [DISCONTINUED] ondansetron (ZOFRAN-ODT) 4 MG TbDL Take 1 tablet (4 mg total) by mouth every 6 (six) hours as needed (nausea and vomiting). 30 tablet 3 12/11/2023    amitriptyline (ELAVIL) 10 MG tablet Take 1 tablet (10 mg total) by mouth every evening. (Patient not taking: Reported on 12/12/2023) 30 tablet 0 Not Taking    GAVILYTE-C 240-22.72-6.72 -5.84 gram SolR Take 4,000 mLs by mouth once.   Unknown    heparin sod,pork in 0.45% NaCl (HEPARIN, PORCINE, 100 UNITS) 100 unit/100 mL (1 unit/mL) SolP in sodium chloride 0.45 % 100 mL infusion Inject 1 mL/hr into the vein continuous.   Unknown    methoxy peg-epoetin beta (MIRCERA INJ) Inject 50 mcg into the skin every 28 days.   Unknown    sevelamer carbonate (RENVELA) 800 mg Tab Take 1 tablet (800 mg total) by mouth 3 (three) times daily with meals. (Patient not taking: Reported on 12/12/2023) 90 tablet 11 Not Taking    sucroferric oxyhydroxide (VELPHORO) 500 mg Chew Take 1 tablet by mouth once daily.   Unknown    torsemide (DEMADEX) 20 MG Tab Take 1 tablet by mouth once daily.   Unknown    [DISCONTINUED] aspirin 81 MG Chew Take 1 tablet (81 mg total) by mouth once daily. (Patient not taking: Reported on 12/12/2023) 30 tablet 0 Not Taking    [DISCONTINUED] atorvastatin (LIPITOR) 40 MG tablet Take 1 tablet (40 mg total) by mouth once daily. (Patient not taking: Reported on 12/12/2023) 30 tablet 0 Not Taking    [DISCONTINUED] baclofen (LIORESAL) 5 mg Tab tablet Take 1 tablet (5 mg total) by mouth  3 (three) times daily. (Patient not taking: Reported on 12/12/2023) 90 tablet 0 Not Taking    [DISCONTINUED] bethanechol (URECHOLINE) 10 MG Tab Take 1 tablet by mouth 3 (three) times daily.       [DISCONTINUED] chlorproMAZINE (THORAZINE) 25 MG tablet Take 1 tablet (25 mg total) by mouth 3 (three) times daily as needed (hiccups). (Patient not taking: Reported on 12/12/2023) 90 tablet 0 Not Taking    [DISCONTINUED] gabapentin (NEURONTIN) 100 MG capsule Take 1 capsule (100 mg total) by mouth 3 (three) times daily. (Patient not taking: Reported on 12/12/2023) 90 capsule 11 Not Taking    [DISCONTINUED] pantoprazole (PROTONIX) 40 MG tablet Take 1 tablet (40 mg total) by mouth once daily. (Patient not taking: Reported on 12/12/2023) 90 tablet 3 Not Taking       Review of patient's allergies indicates:  No Known Allergies    Past Medical History:   Diagnosis Date    Diabetes mellitus, type 2     Diabetic foot ulcer associated with diabetes mellitus due to underlying condition 07/16/2020    ESRD on hemodialysis     Hyperlipidemia     Hypertension     Obese      Past Surgical History:   Procedure Laterality Date    AV FISTULA PLACEMENT Left 07/06/2023    Procedure: CREATION, AV FISTULA;  Surgeon: Daniel Poon MD;  Location: [facility] OR;  Service: Vascular;  Laterality: Left;  RN PREOP 6/28/2023  LABS DAY OF SURGERY    BONE BIOPSY Left 07/17/2020    Procedure: BIOPSY, BONE;  Surgeon: Verona Whitmore DPM;  Location: [facility] OR;  Service: Podiatry;  Laterality: Left;    CERVICAL DISC SURGERY  07/11/2016    DEBRIDEMENT Left 07/17/2020    Procedure: DEBRIDEMENT;  Surgeon: Verona Whitmore DPM;  Location: [facility] OR;  Service: Podiatry;  Laterality: Left;    DEBRIDEMENT OF FOOT Right     toes & plantar surface    ESOPHAGEAL MANOMETRY WITH MEASUREMENT OF IMPEDANCE N/A 03/27/2023    Procedure: MANOMETRY, ESOPHAGUS, WITH IMPEDANCE MEASUREMENT;  Surgeon: Roopa Pérez MD;  Location: [facility] ENDO [address]);  Service: Endoscopy;   Laterality: N/A;  Belching and rumination protocol please  instructions via portal - sm    ESOPHAGOGASTRODUODENOSCOPY N/A 12/16/2022    Procedure: EGD (ESOPHAGOGASTRODUODENOSCOPY);  Surgeon: Eren Francois MD;  Location: [facility] ENDO;  Service: Endoscopy;  Laterality: N/A;  Pt. on Dialysis. K+ ordered prior to procedure.EC    ESOPHAGOGASTRODUODENOSCOPY N/A 12/5/2023    Procedure: EGD (ESOPHAGOGASTRODUODENOSCOPY);  Surgeon: Kash Johnston MD;  Location: [facility] ENDO;  Service: Endoscopy;  Laterality: N/A;    FRACTURE SURGERY Right     medial ankle, metal plate present    INSERTION OF TUNNELED CENTRAL VENOUS CATHETER (CVC) WITH SUBCUTANEOUS PORT N/A 06/11/2021    Procedure: TUNNEL CATH INSERTION WITHOUT PORT;  Surgeon: Jose Manuel Diagnostic Provider;  Location: [facility] OR;  Service: Radiology;  Laterality: N/A;  11AM START---PHONE PREOP 6/10/21---COVID POSTIVE ON 7/2020---NO S/S    left leg orthopedic surgery Left     UPPER GASTROINTESTINAL ENDOSCOPY       Family History       Problem Relation (Age of Onset)    Heart disease Father          Tobacco Use    Smoking status: Never    Smokeless tobacco: Never   Substance and Sexual Activity    Alcohol use: Not Currently     Comment: occ rare beer    Drug use: No    Sexual activity: Not Currently       Objective:     Weight: 91.1 kg (200 lb 13.4 oz)  Body mass index is 26.5 kg/m².  Vital Signs (Most Recent):  Temp: 98.2 °F (36.8 °C) (12/13/23 0723)  Pulse: 77 (12/13/23 0847)  Resp: 18 (12/13/23 0847)  BP: (!) 148/68 (12/13/23 0723)  SpO2: 97 % (12/13/23 0847) Vital Signs (24h Range):  Temp:  [97.7 °F (36.5 °C)-98.2 °F (36.8 °C)] 98.2 °F (36.8 °C)  Pulse:  [73-83] 77  Resp:  [18-19] 18  SpO2:  [96 %-99 %] 97 %  BP: (135-161)/(64-84) 148/68                              Hemodialysis Catheter right subclavian (Active)   Line Necessity Review CRRT/HD 12/12/23 1942   Site Assessment No warmth;No swelling;No redness 12/13/23 0615   Line Securement Device Secured with sutures 12/12/23  "1942   Dressing Type CHG impregnated dressing/sponge 12/13/23 0615   Dressing Status Clean;Dry;Intact 12/13/23 0615   Dressing Intervention Integrity maintained 12/13/23 0615   Date on Dressing 12/14/23 12/12/23 1942   Dressing Due to be Changed 12/14/23 12/13/23 0615   Venous Patency/Care accessed;flushed w/o difficulty 12/12/23 1800   Arterial Patency/Care accessed;flushed w/o difficulty 12/12/23 1800         Neurosurgery Physical Exam  General: well developed, well nourished, no distress.   Head: normocephalic, atraumatic  Neck: No tracheal deviation. Full ROM.   Neurologic: Alert and oriented. Thought content appropriate.  GCS: E4 V5 M6; Total: 15  Pulmonary: normal respirations, no signs of respiratory distress  Skin: Skin is warm, dry and intact.    Sensory:  Decreased sensation right anterior forearm.  Motor Strength: Moves all extremities spontaneously with good tone.  Full strength upper and lower extremities. No abnormal movements seen.     Significant Labs:  Recent Labs   Lab 12/11/23  1728 12/12/23 0414 12/13/23 0412    158* 84   * 134* 133*   K 4.7 4.1 4.6    102 97   CO2 19* 16* 24   BUN 53* 55* 35*   CREATININE 9.3* 9.6* 7.9*   CALCIUM 10.2 9.3 9.1   MG  --  2.2 2.2     Recent Labs   Lab 12/11/23  1728 12/12/23 0414 12/13/23 0412   WBC 6.86 4.94 4.45   HGB 12.6* 11.1* 11.4*   HCT 38.8* 34.5* 35.3*    212 183     No results for input(s): "LABPT", "INR", "APTT" in the last 48 hours.  Microbiology Results (last 7 days)       ** No results found for the last 168 hours. **            "

## 2023-12-13 NOTE — CARE UPDATE
12/12/23 2022   Patient Assessment/Suction   Level of Consciousness (AVPU) alert   Respiratory Effort Unlabored   PRE-TX-O2   Device (Oxygen Therapy) room air   SpO2 98 %   Pulse Oximetry Type Intermittent   $ Pulse Oximetry - Multiple Charge Pulse Oximetry - Multiple   Pulse 79   Resp 19   Aerosol Therapy   $ Aerosol Therapy Charges PRN treatment not required   Respiratory Treatment Status (SVN) PRN treatment not required

## 2023-12-13 NOTE — PLAN OF CARE
Problem: Adult Inpatient Plan of Care  Goal: Plan of Care Review  Outcome: Ongoing, Progressing  Goal: Patient-Specific Goal (Individualized)  Outcome: Ongoing, Progressing  Goal: Absence of Hospital-Acquired Illness or Injury  Outcome: Ongoing, Progressing  Goal: Optimal Comfort and Wellbeing  Outcome: Ongoing, Progressing  Goal: Readiness for Transition of Care  Outcome: Ongoing, Progressing     Problem: Diabetes Comorbidity  Goal: Blood Glucose Level Within Targeted Range  Outcome: Ongoing, Progressing     Problem: Infection  Goal: Absence of Infection Signs and Symptoms  Outcome: Ongoing, Progressing     Problem: Skin Injury Risk Increased  Goal: Skin Health and Integrity  Outcome: Ongoing, Progressing     Problem: Fall Injury Risk  Goal: Absence of Fall and Fall-Related Injury  Outcome: Ongoing, Progressing     Problem: Device-Related Complication Risk (Hemodialysis)  Goal: Safe, Effective Therapy Delivery  Outcome: Ongoing, Progressing     POC reviewed with patient, patient verbalized understanding CT abdomen and Chest done awaiting results. HD continued Tuesday thurs and sat. Blood pressure better controlled with the scheduled PO meds. Denies pain at this time. Pt remains free from falls, no acute events noted during the shift. Pt is working with PT & OT. Neuro checks remain intact, blood glucose monitored. Tolerating sugar free clears. Will continue to monitor.

## 2023-12-13 NOTE — NURSING
Pt arrived to floor from MRI. Pt resting in bed. NAD noted. VSS. IV site intact. Call light within reach. Safety maintained. Will continue to monitor.

## 2023-12-13 NOTE — CONSULTS
[facility]/Surg  Hematology/Oncology  Consult Note    Patient Name: Catalino Mcfadden  MRN: [id number]  Admission Date: 12/11/2023  Hospital Length of Stay: 0 days  Code Status: Full Code   Attending Provider: Judy Lane MD  Consulting Provider: Brandi Harris NP  Primary Care Physician: Marty Rodríguez PA-C  Principal Problem:Abnormal MRI, spine    Consults  Subjective:     HPI: Catalino Mcfadden is a 57 y/o M with past medical history significant for HTN, ESRD on HD, DM2, gastroparesis, diabetic foot ulcer, chronic hiccups, and HLD who presented to the ED for with episodic dizziness, vomiting, and lower extremity weakness following a fall x 1 day. He reports nausea has been resolved with Zofran. He denies chest pain, shortness of breath, fever, chills, abdominal pain, headaches, light-headedness, numbness, tingling, or LOC. He reports he missed his Saturday hemodialysis treatment but states last week he received HD Tues, Wed, Thurs, during hospitaliztion. He was recently discharged 12/07. In the ED blood pressure 200s/100s, received IV and PO antihypertensive medication with mild improvement. Per med rec     We have been consulted for abnormal lesion found on spine concerning for metastatic disease versus multiple myeloma.  At the time of my visit he is sitting in the chair eating a popsicle.  He is complaining of weakness and unintentional weight loss.    Oncology Treatment Plan:   [No matching plan found]    Medications:  Continuous Infusions:  Scheduled Meds:   amitriptyline  10 mg Oral QHS    heparin (porcine)  5,000 Units Subcutaneous Q8H    hydrALAZINE  100 mg Oral TID    NIFEdipine  90 mg Oral Daily    pantoprazole  40 mg Oral Daily    sodium chloride 0.9%  10 mL Intravenous Q8H     PRN Meds:sodium chloride 0.9%, acetaminophen, albuterol-ipratropium, aluminum-magnesium hydroxide-simethicone, chlorproMAZINE, glucagon (human recombinant), glucose, glucose, heparin (porcine), insulin aspart U-100,  melatonin, naloxone, ondansetron, prochlorperazine, senna-docusate 8.6-50 mg, sodium chloride 0.9%     Review of patient's allergies indicates:  No Known Allergies     Past Medical History:   Diagnosis Date    Diabetes mellitus, type 2     Diabetic foot ulcer associated with diabetes mellitus due to underlying condition 07/16/2020    ESRD on hemodialysis     Hyperlipidemia     Hypertension     Obese      Past Surgical History:   Procedure Laterality Date    AV FISTULA PLACEMENT Left 07/06/2023    Procedure: CREATION, AV FISTULA;  Surgeon: Daniel Poon MD;  Location: [facility];  Service: Vascular;  Laterality: Left;  RN PREOP 6/28/2023  LABS DAY OF SURGERY    BONE BIOPSY Left 07/17/2020    Procedure: BIOPSY, BONE;  Surgeon: Verona Whitmore DPM;  Location: [facility] OR;  Service: Podiatry;  Laterality: Left;    CERVICAL DISC SURGERY  07/11/2016    DEBRIDEMENT Left 07/17/2020    Procedure: DEBRIDEMENT;  Surgeon: Verona Whitmore DPM;  Location: [facility] OR;  Service: Podiatry;  Laterality: Left;    DEBRIDEMENT OF FOOT Right     toes & plantar surface    ESOPHAGEAL MANOMETRY WITH MEASUREMENT OF IMPEDANCE N/A 03/27/2023    Procedure: MANOMETRY, ESOPHAGUS, WITH IMPEDANCE MEASUREMENT;  Surgeon: Roopa Pérez MD;  Location: [facility]  Service: Endoscopy;  Laterality: N/A;  Belching and rumination protocol please  instructions via portal - sm    ESOPHAGOGASTRODUODENOSCOPY N/A 12/16/2022    Procedure: EGD (ESOPHAGOGASTRODUODENOSCOPY);  Surgeon: Eren Francois MD;  Location: [facility];  Service: Endoscopy;  Laterality: N/A;  Pt. on Dialysis. K+ ordered prior to procedure.EC    ESOPHAGOGASTRODUODENOSCOPY N/A 12/5/2023    Procedure: EGD (ESOPHAGOGASTRODUODENOSCOPY);  Surgeon: Kash Johnston MD;  Location: [facility];  Service: Endoscopy;  Laterality: N/A;    FRACTURE SURGERY Right     medial ankle, metal plate present    INSERTION OF TUNNELED CENTRAL VENOUS CATHETER (CVC) WITH SUBCUTANEOUS PORT N/A  06/11/2021    Procedure: TUNNEL CATH INSERTION WITHOUT PORT;  Surgeon: Jose Manuel Diagnostic Provider;  Location: [facility] OR;  Service: Radiology;  Laterality: N/A;  11AM START---PHONE PREOP 6/10/21---COVID POSTIVE ON 7/2020---NO S/S    left leg orthopedic surgery Left     UPPER GASTROINTESTINAL ENDOSCOPY       Family History       Problem Relation (Age of Onset)    Heart disease Father          Tobacco Use    Smoking status: Never    Smokeless tobacco: Never   Substance and Sexual Activity    Alcohol use: Not Currently     Comment: occ rare beer    Drug use: No    Sexual activity: Not Currently       Review of Systems  As per mentioned in HPI; all other systems reviewed and negative  Objective:     Vital Signs (Most Recent):  Temp: 98.2 °F (36.8 °C) (12/13/23 0723)  Pulse: 77 (12/13/23 0847)  Resp: 18 (12/13/23 0847)  BP: (!) 148/68 (12/13/23 0723)  SpO2: 97 % (12/13/23 0847) Vital Signs (24h Range):  Temp:  [97.3 °F (36.3 °C)-98.2 °F (36.8 °C)] 98.2 °F (36.8 °C)  Pulse:  [73-83] 77  Resp:  [18-19] 18  SpO2:  [96 %-99 %] 97 %  BP: (135-170)/(64-86) 148/68     Weight: 91.1 kg (200 lb 13.4 oz)  Body mass index is 26.5 kg/m².  Body surface area is 2.17 meters squared.      Intake/Output Summary (Last 24 hours) at 12/13/2023 1230  Last data filed at 12/13/2023 0619  Gross per 24 hour   Intake 1010 ml   Output 4100 ml   Net -3090 ml       Physical Exam  GENERAL: appears well-built, well-nourished.  No anxiety, no agitation, and in no distress.  Patient is awake, alert, oriented and cooperative.  HEENT:  Showed no congestion. Trachea is central no obvious icterus or pallor noted via video.  NECK:  Supple.  No JVD. No obvious cervical submental or supraclavicular adenopathy.  RS:the visualized portion of  Chest expands well. chest appears symmetric, no audible wheezes.  ABDOMEN: the visualized portion of  abdomen appears undistended.  EXTREMITIES:  Without edema.  NEUROLOGICAL:  The patient is appropriate, higher functions are  normal.  No neuro deficits.  No gait abnormality noted.  No confusion, no speech impediment. Cranial nerves are intact and show no deficit. No motor deficits noted through video.  SKIN MUSCULOSKELETAL: no joint or skeletal deformity, ambulating around room, no clubbing of nails.   Significant Labs:   CBC:   Recent Labs   Lab 12/11/23 1728 12/12/23 0414 12/13/23 0412   WBC 6.86 4.94 4.45   HGB 12.6* 11.1* 11.4*   HCT 38.8* 34.5* 35.3*    212 183    and CMP:   Recent Labs   Lab 12/11/23 1728 12/12/23 0414 12/13/23 0412   * 134* 133*   K 4.7 4.1 4.6    102 97   CO2 19* 16* 24    158* 84   BUN 53* 55* 35*   CREATININE 9.3* 9.6* 7.9*   CALCIUM 10.2 9.3 9.1   PROT 8.5* 7.1 7.3   ALBUMIN 3.0* 2.5* 2.6*   BILITOT 1.5* 1.2* 1.4*   ALKPHOS 854* 693* 705*   AST 64* 48* 50*   ALT 30 25 28   ANIONGAP 15 16 12       Diagnostic Results:  I have reviewed all pertinent imaging results/findings within the past 24 hours.    Assessment/Plan:     Active Diagnoses:    Diagnosis Date Noted POA    PRINCIPAL PROBLEM:  Abnormal MRI, spine [R93.7] 12/12/2023 Unknown    Hypertensive emergency [I16.1] 12/12/2023 Yes    Lower extremity weakness [R29.898] 12/12/2023 Yes    Intractable hiccups [R06.6] 12/04/2023 Yes    Serum total bilirubin elevated [R17] 12/04/2023 Yes    ESRD on hemodialysis [N18.6, Z99.2] 02/22/2023 Not Applicable    Type 2 diabetes mellitus with stage 5 chronic kidney disease [E11.22, N18.5] 08/26/2014 Yes      Problems Resolved During this Admission:    Diagnosis Date Noted Date Resolved POA    ESRD (end stage renal disease) [N18.6]  12/12/2023 Yes       Abnormal lesion versus multiple myeloma:   -check SPEP with immunofixation and serum free light chains  -UPEP and UFLC  -beta 2 and LDH  -skeletal survey  -if myeloma workup is positive he will need bone marrow biopsy    Thank you for your consult. I will follow-up with patient. Please contact us if you have any additional questions.    Brandi MARLOW  SWATHI Harris  Hematology/Oncology  [facility]/Surg

## 2023-12-13 NOTE — CONSULTS
[facility]/Surg  Neurosurgery  Consult Note    Consults  Subjective:     Chief Complaint/Reason for Admission:  Dizziness and emesis    History of Present Illness: Mr. Catalino Mcfadden is a 58-year-old male with past medical history of type 2 diabetes, neuropathy, end-stage renal failure treated with hemodialysis, status post C5-7 ACDF, AV fistula left upper arm, hypertension, hyperlipidemia presented to the emergency department on 12/11/2023 due to dizziness, vomiting, and a fall secondary to bilateral leg weakness.  During ED evaluation, patient's blood pressure was 232/110 and he was subsequently admitted due to hypertensive emergency.  MRI lumbar spine without contrast ordered due to fall which was most significant for multiple scattered osseous lesions throughout the visualized lumbosacral spine and iliac bones which are concerning for metastatic disease.  These findings were not found on cervical or thoracic vertebral bodies.    During encounter, patient was found sitting at bedside in chair.  He reports approximately 3 days ago he was walking to his car and fell. He had difficulty pulling his self up but did eventually got into car and drove off.  He reported once he got home he had concerns about falling again and his roommate prompted him to go to the emergency department.  He reports bilateral lower extremity weakness for approximately 6 months.  Right leg weakness greater than left leg.  He also reports right anterior forearm numbness for the last 6 months.  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  Denies usage of walker, recurrent falls, imbalance, bladder or bowel incontinence, upper or lower extremity radicular pain, neck pain or low back pain.    Neurosurgery consulted.    Medications Prior to Admission   Medication Sig Dispense Refill Last Dose    hydrALAZINE (APRESOLINE) 100 MG tablet Take 1 tablet (100 mg total) by  mouth 3 (three) times daily. 90 tablet 0 12/11/2023    NIFEdipine (PROCARDIA XL) 90 MG (OSM) 24 hr tablet Take 1 tablet (90 mg total) by mouth once daily. 90 tablet 3 12/11/2023    promethazine (PHENERGAN) 12.5 MG Tab Take 1 tablet (12.5 mg total) by mouth every 6 (six) hours as needed (nausea and vomiting). 30 tablet 3 12/11/2023    [DISCONTINUED] ondansetron (ZOFRAN-ODT) 4 MG TbDL Take 1 tablet (4 mg total) by mouth every 6 (six) hours as needed (nausea and vomiting). 30 tablet 3 12/11/2023    amitriptyline (ELAVIL) 10 MG tablet Take 1 tablet (10 mg total) by mouth every evening. (Patient not taking: Reported on 12/12/2023) 30 tablet 0 Not Taking    GAVILYTE-C 240-22.72-6.72 -5.84 gram SolR Take 4,000 mLs by mouth once.   Unknown    heparin sod,pork in 0.45% NaCl (HEPARIN, PORCINE, 100 UNITS) 100 unit/100 mL (1 unit/mL) SolP in sodium chloride 0.45 % 100 mL infusion Inject 1 mL/hr into the vein continuous.   Unknown    methoxy peg-epoetin beta (MIRCERA INJ) Inject 50 mcg into the skin every 28 days.   Unknown    sevelamer carbonate (RENVELA) 800 mg Tab Take 1 tablet (800 mg total) by mouth 3 (three) times daily with meals. (Patient not taking: Reported on 12/12/2023) 90 tablet 11 Not Taking    sucroferric oxyhydroxide (VELPHORO) 500 mg Chew Take 1 tablet by mouth once daily.   Unknown    torsemide (DEMADEX) 20 MG Tab Take 1 tablet by mouth once daily.   Unknown    [DISCONTINUED] aspirin 81 MG Chew Take 1 tablet (81 mg total) by mouth once daily. (Patient not taking: Reported on 12/12/2023) 30 tablet 0 Not Taking    [DISCONTINUED] atorvastatin (LIPITOR) 40 MG tablet Take 1 tablet (40 mg total) by mouth once daily. (Patient not taking: Reported on 12/12/2023) 30 tablet 0 Not Taking    [DISCONTINUED] baclofen (LIORESAL) 5 mg Tab tablet Take 1 tablet (5 mg total) by mouth 3 (three) times daily. (Patient not taking: Reported on 12/12/2023) 90 tablet 0 Not Taking    [DISCONTINUED] bethanechol (URECHOLINE) 10 MG Tab Take  1 tablet by mouth 3 (three) times daily.       [DISCONTINUED] chlorproMAZINE (THORAZINE) 25 MG tablet Take 1 tablet (25 mg total) by mouth 3 (three) times daily as needed (hiccups). (Patient not taking: Reported on 12/12/2023) 90 tablet 0 Not Taking    [DISCONTINUED] gabapentin (NEURONTIN) 100 MG capsule Take 1 capsule (100 mg total) by mouth 3 (three) times daily. (Patient not taking: Reported on 12/12/2023) 90 capsule 11 Not Taking    [DISCONTINUED] pantoprazole (PROTONIX) 40 MG tablet Take 1 tablet (40 mg total) by mouth once daily. (Patient not taking: Reported on 12/12/2023) 90 tablet 3 Not Taking       Review of patient's allergies indicates:  No Known Allergies    Past Medical History:   Diagnosis Date    Diabetes mellitus, type 2     Diabetic foot ulcer associated with diabetes mellitus due to underlying condition 07/16/2020    ESRD on hemodialysis     Hyperlipidemia     Hypertension     Obese      Past Surgical History:   Procedure Laterality Date    AV FISTULA PLACEMENT Left 07/06/2023    Procedure: CREATION, AV FISTULA;  Surgeon: Daniel Poon MD;  Location: [facility] OR;  Service: Vascular;  Laterality: Left;  RN PREOP 6/28/2023  LABS DAY OF SURGERY    BONE BIOPSY Left 07/17/2020    Procedure: BIOPSY, BONE;  Surgeon: Verona Whitmore DPM;  Location: [facility] OR;  Service: Podiatry;  Laterality: Left;    CERVICAL DISC SURGERY  07/11/2016    DEBRIDEMENT Left 07/17/2020    Procedure: DEBRIDEMENT;  Surgeon: Verona Whitmore DPM;  Location: [facility] OR;  Service: Podiatry;  Laterality: Left;    DEBRIDEMENT OF FOOT Right     toes & plantar surface    ESOPHAGEAL MANOMETRY WITH MEASUREMENT OF IMPEDANCE N/A 03/27/2023    Procedure: MANOMETRY, ESOPHAGUS, WITH IMPEDANCE MEASUREMENT;  Surgeon: Roopa Pérez MD;  Location: [facility] ENDO ([address] [place] [address]);  Service: Endoscopy;  Laterality: N/A;  Belching and rumination protocol please  instructions via portal -     ESOPHAGOGASTRODUODENOSCOPY N/A 12/16/2022    Procedure: EGD  (ESOPHAGOGASTRODUODENOSCOPY);  Surgeon: Eren Francois MD;  Location: [facility] ENDO;  Service: Endoscopy;  Laterality: N/A;  Pt. on Dialysis. K+ ordered prior to procedure.EC    ESOPHAGOGASTRODUODENOSCOPY N/A 12/5/2023    Procedure: EGD (ESOPHAGOGASTRODUODENOSCOPY);  Surgeon: Kash Johnston MD;  Location: [facility] ENDO;  Service: Endoscopy;  Laterality: N/A;    FRACTURE SURGERY Right     medial ankle, metal plate present    INSERTION OF TUNNELED CENTRAL VENOUS CATHETER (CVC) WITH SUBCUTANEOUS PORT N/A 06/11/2021    Procedure: TUNNEL CATH INSERTION WITHOUT PORT;  Surgeon: Dosnadeem Diagnostic Provider;  Location: [facility] OR;  Service: Radiology;  Laterality: N/A;  11AM START---PHONE PREOP 6/10/21---COVID POSTIVE ON 7/2020---NO S/S    left leg orthopedic surgery Left     UPPER GASTROINTESTINAL ENDOSCOPY       Family History       Problem Relation (Age of Onset)    Heart disease Father          Tobacco Use    Smoking status: Never    Smokeless tobacco: Never   Substance and Sexual Activity    Alcohol use: Not Currently     Comment: occ rare beer    Drug use: No    Sexual activity: Not Currently       Objective:     Weight: 91.1 kg (200 lb 13.4 oz)  Body mass index is 26.5 kg/m².  Vital Signs (Most Recent):  Temp: 98.2 °F (36.8 °C) (12/13/23 0723)  Pulse: 77 (12/13/23 0847)  Resp: 18 (12/13/23 0847)  BP: (!) 148/68 (12/13/23 0723)  SpO2: 97 % (12/13/23 0847) Vital Signs (24h Range):  Temp:  [97.7 °F (36.5 °C)-98.2 °F (36.8 °C)] 98.2 °F (36.8 °C)  Pulse:  [73-83] 77  Resp:  [18-19] 18  SpO2:  [96 %-99 %] 97 %  BP: (135-161)/(64-84) 148/68                              Hemodialysis Catheter right subclavian (Active)   Line Necessity Review CRRT/HD 12/12/23 1942   Site Assessment No warmth;No swelling;No redness 12/13/23 0615   Line Securement Device Secured with sutures 12/12/23 1942   Dressing Type CHG impregnated dressing/sponge 12/13/23 0615   Dressing Status Clean;Dry;Intact 12/13/23 0615   Dressing Intervention Integrity  "maintained 12/13/23 0615   Date on Dressing 12/14/23 12/12/23 1942   Dressing Due to be Changed 12/14/23 12/13/23 0615   Venous Patency/Care accessed;flushed w/o difficulty 12/12/23 1800   Arterial Patency/Care accessed;flushed w/o difficulty 12/12/23 1800         Neurosurgery Physical Exam  General: well developed, well nourished, no distress.   Head: normocephalic, atraumatic  Neck: No tracheal deviation. Full ROM.   Neurologic: Alert and oriented. Thought content appropriate.  GCS: E4 V5 M6; Total: 15  Pulmonary: normal respirations, no signs of respiratory distress  Skin: Skin is warm, dry and intact.    Sensory:  Decreased sensation right anterior forearm.  Motor Strength: Moves all extremities spontaneously with good tone.  Full strength upper and lower extremities. No abnormal movements seen.     Significant Labs:  Recent Labs   Lab 12/11/23 1728 12/12/23 0414 12/13/23 0412    158* 84   * 134* 133*   K 4.7 4.1 4.6    102 97   CO2 19* 16* 24   BUN 53* 55* 35*   CREATININE 9.3* 9.6* 7.9*   CALCIUM 10.2 9.3 9.1   MG  --  2.2 2.2     Recent Labs   Lab 12/11/23  1728 12/12/23 0414 12/13/23 0412   WBC 6.86 4.94 4.45   HGB 12.6* 11.1* 11.4*   HCT 38.8* 34.5* 35.3*    212 183     No results for input(s): "LABPT", "INR", "APTT" in the last 48 hours.  Microbiology Results (last 7 days)       ** No results found for the last 168 hours. **            Assessment/Plan:     Lower extremity weakness  Mr. Catalino Mcfadden is a 58-year-old male with past medical history of type 2 diabetes, neuropathy, end-stage renal failure treated with hemodialysis, status post C5-7 ACDF, AV fistula left upper arm, hypertension, hyperlipidemia presented to the emergency department on 12/11/2023 due to dizziness, vomiting, and a fall secondary to bilateral leg weakness.      Neurologically intact.    --Patient admitted to Hospital Medicine on telemetry      -q4h neurochecks on " floor  --PT/OT/OOB  --TEDs/SCDs  --Continue to monitor clinically, notify NSGY immediately with any changes in neuro status    Dispo:  Additional recommendations to follow.    Thank you for your consult. Please contact us if you have any additional questions.    BENITA BRADFORD PA-C  Neurosurgery  [facility]/Surg

## 2023-12-13 NOTE — PROGRESS NOTES
INPATIENT NEPHROLOGY Progress Note   [facility]    Patient Name: Catalino Mcfadden  Date: 12/13/2023    Reason for consultation: ESRD    Chief Complaint:   Chief Complaint   Patient presents with    Dizziness    Vomiting    Fall     For several days        History of Present Illness:  Catalino Mcfadden is a 57 y/o M with past medical history significant for HTN, ESRD on HD, DM2, gastroparesis, diabetic foot ulcer, chronic hiccups, and HLD who presented to the ED for with episodic dizziness, vomiting, and lower extremity weakness following a fall x 1 day. He reports nausea has been resolved with Zofran. He denies chest pain, shortness of breath, fever, chills, abdominal pain, headaches, light-headedness, numbness, tingling, or LOC. He reports he missed his Saturday hemodialysis treatment but states last week he received HD Tues, Wed, Thurs, during hospitaliztion. He was recently discharged 12/07. In the ED blood pressure 200s/100s, received IV and PO antihypertensive medication with mild improvemnt. He states he did not take his medication yesterday due to N/V. CT Head negative. MRI brain neg. Consulted for dialysis.    Interval History:  12/12- BP better, on RA, no complaints  12/13- MRI L spine abnormal- getting w/u for malignancy vs paraprotein- neuro, neurosurg and heme/onc consulted    Plan of Care:    Assessment:  ESRD on HD TTS  HTN emergency  Hyponatremia  SHPT  Anemia of CKD    Plan:    - HD TTS  - continue home BP meds  - UF 3-4L  - renal diet, 1.5L fluid restriction  - resume binder with meals  - no acute CLEMENT needs    Thank you for allowing us to participate in this patient's care. We will continue to follow.    Vital Signs:  Temp Readings from Last 3 Encounters:   12/13/23 98.2 °F (36.8 °C) (Oral)   12/07/23 98.3 °F (36.8 °C)   07/06/23 98.4 °F (36.9 °C) (Oral)       Pulse Readings from Last 3 Encounters:   12/13/23 77   12/07/23 99   12/01/23 95       BP Readings from Last 3 Encounters:    12/13/23 (!) 148/68   12/07/23 (!) 185/89   12/01/23 (!) 153/76       Weight:  Wt Readings from Last 3 Encounters:   12/12/23 91.1 kg (200 lb 13.4 oz)   12/05/23 102.1 kg (225 lb)   12/01/23 96.1 kg (211 lb 13.8 oz)       Medications:  Scheduled Meds:   amitriptyline  10 mg Oral QHS    heparin (porcine)  5,000 Units Subcutaneous Q8H    hydrALAZINE  100 mg Oral TID    NIFEdipine  90 mg Oral Daily    pantoprazole  40 mg Oral Daily    sodium chloride 0.9%  10 mL Intravenous Q8H     Continuous Infusions:  PRN Meds:.sodium chloride 0.9%, acetaminophen, albuterol-ipratropium, aluminum-magnesium hydroxide-simethicone, chlorproMAZINE, glucagon (human recombinant), glucose, glucose, heparin (porcine), insulin aspart U-100, melatonin, naloxone, ondansetron, prochlorperazine, senna-docusate 8.6-50 mg, sodium chloride 0.9%  No current facility-administered medications on file prior to encounter.     Current Outpatient Medications on File Prior to Encounter   Medication Sig Dispense Refill    hydrALAZINE (APRESOLINE) 100 MG tablet Take 1 tablet (100 mg total) by mouth 3 (three) times daily. 90 tablet 0    NIFEdipine (PROCARDIA XL) 90 MG (OSM) 24 hr tablet Take 1 tablet (90 mg total) by mouth once daily. 90 tablet 3    promethazine (PHENERGAN) 12.5 MG Tab Take 1 tablet (12.5 mg total) by mouth every 6 (six) hours as needed (nausea and vomiting). 30 tablet 3    amitriptyline (ELAVIL) 10 MG tablet Take 1 tablet (10 mg total) by mouth every evening. (Patient not taking: Reported on 12/12/2023) 30 tablet 0    GAVILYTE-C 240-22.72-6.72 -5.84 gram SolR Take 4,000 mLs by mouth once.      heparin sod,pork in 0.45% NaCl (HEPARIN, PORCINE, 100 UNITS) 100 unit/100 mL (1 unit/mL) SolP in sodium chloride 0.45 % 100 mL infusion Inject 1 mL/hr into the vein continuous.      methoxy peg-epoetin beta (MIRCERA INJ) Inject 50 mcg into the skin every 28 days.      sevelamer carbonate (RENVELA) 800 mg Tab Take 1 tablet (800 mg total) by mouth 3  (three) times daily with meals. (Patient not taking: Reported on 12/12/2023) 90 tablet 11    sucroferric oxyhydroxide (VELPHORO) 500 mg Chew Take 1 tablet by mouth once daily.      torsemide (DEMADEX) 20 MG Tab Take 1 tablet by mouth once daily.       Review of Systems:  Neg    Physical Exam:  General Appearance:    NAD, AAO x 3, cooperative, appears stated age   Head:    Normocephalic, atraumatic   Eyes:    PER, EOMI, and conjunctiva/sclera clear bilaterally       Mouth:   Moist mucus membranes, no thrush or oral lesions,       normal dentition   Back:     No CVA tenderness   Lungs:     Clear to auscultation bilaterally, no wheezes, crackles,           rales or rhonchi, symmetric air movement, respirations unlabored   Chest wall:    No tenderness or deformity   Heart:    Regular rate and rhythm, S1 and S2 normal, no murmur, rub   or gallop   Abdomen:     Soft, non-tender, non-distended, bowel sounds active all four   quadrants, no RT or guarding, no masses, no organomegaly   Extremities:   Warm and well perfused, distal pulses are intact, no             cyanosis or peripheral edema   MSK:   No joint or muscle swelling, tenderness or deformity   Skin:   Skin color, texture, turgor normal, no rashes or lesions   Neurologic/Psychiatric:   CNII-XII intact, normal strength and sensation       throughout, no asterixis; normal affect, memory, judgement     and insight      Results:  Lab Results   Component Value Date     (L) 12/13/2023    K 4.6 12/13/2023    CL 97 12/13/2023    CO2 24 12/13/2023    BUN 35 (H) 12/13/2023    CREATININE 7.9 (H) 12/13/2023    CALCIUM 9.1 12/13/2023    ANIONGAP 12 12/13/2023    ESTGFRAFRICA 10.4 (A) 02/23/2022    EGFRNONAA 9.0 (A) 02/23/2022       Lab Results   Component Value Date    CALCIUM 9.1 12/13/2023    PHOS 2.9 12/07/2023       Recent Labs   Lab 12/13/23  0412   WBC 4.45   RBC 3.94*   HGB 11.4*   HCT 35.3*      MCV 90   MCH 28.9   MCHC 32.3         I have personally  reviewed pertinent radiological imaging and reports.    I have spent > 35 minutes providing care for this patient for the above diagnoses. These services have included chart/data/imaging review, evaluation, exam, formulation of plan, , note preparation, and discussions with staff involved in this patient's care.    Oscar Canas MD MPH  [facility]  [address] [place] [id number]  [phone] (p)  [phone] (f)

## 2023-12-14 ENCOUNTER — TELEPHONE (OUTPATIENT)
Dept: ENDOSCOPY | Facility: HOSPITAL | Age: 58
End: 2023-12-14
Payer: COMMERCIAL

## 2023-12-14 PROBLEM — Z71.89 GOALS OF CARE, COUNSELING/DISCUSSION: Status: ACTIVE | Noted: 2023-12-14

## 2023-12-14 LAB
ALBUMIN SERPL BCP-MCNC: 2.5 G/DL (ref 3.5–5.2)
ALP SERPL-CCNC: 688 U/L (ref 55–135)
ALT SERPL W/O P-5'-P-CCNC: 31 U/L (ref 10–44)
ANION GAP SERPL CALC-SCNC: 13 MMOL/L (ref 8–16)
AST SERPL-CCNC: 53 U/L (ref 10–40)
BASOPHILS # BLD AUTO: 0.03 K/UL (ref 0–0.2)
BASOPHILS NFR BLD: 0.8 % (ref 0–1.9)
BILIRUB SERPL-MCNC: 1.4 MG/DL (ref 0.1–1)
BUN SERPL-MCNC: 42 MG/DL (ref 6–20)
CALCIUM SERPL-MCNC: 9 MG/DL (ref 8.7–10.5)
CHLORIDE SERPL-SCNC: 96 MMOL/L (ref 95–110)
CO2 SERPL-SCNC: 23 MMOL/L (ref 23–29)
CREAT SERPL-MCNC: 9 MG/DL (ref 0.5–1.4)
DIFFERENTIAL METHOD BLD: ABNORMAL
EOSINOPHIL # BLD AUTO: 0.2 K/UL (ref 0–0.5)
EOSINOPHIL NFR BLD: 4 % (ref 0–8)
ERYTHROCYTE [DISTWIDTH] IN BLOOD BY AUTOMATED COUNT: 16 % (ref 11.5–14.5)
EST. GFR  (NO RACE VARIABLE): 6 ML/MIN/1.73 M^2
GLUCOSE SERPL-MCNC: 82 MG/DL (ref 70–110)
HCT VFR BLD AUTO: 34.6 % (ref 40–54)
HGB BLD-MCNC: 11.3 G/DL (ref 14–18)
IMM GRANULOCYTES # BLD AUTO: 0.01 K/UL (ref 0–0.04)
IMM GRANULOCYTES NFR BLD AUTO: 0.3 % (ref 0–0.5)
KAPPA LC FREE SER-MCNC: 26.3 MG/DL (ref 0.33–1.94)
KAPPA LC FREE/LAMBDA FREE SER: 1.42 {RATIO} (ref 0.26–1.65)
LAMBDA LC FREE SERPL-MCNC: 18.5 MG/DL (ref 0.57–2.63)
LYMPHOCYTES # BLD AUTO: 0.7 K/UL (ref 1–4.8)
LYMPHOCYTES NFR BLD: 18.8 % (ref 18–48)
MAGNESIUM SERPL-MCNC: 2.2 MG/DL (ref 1.6–2.6)
MCH RBC QN AUTO: 29.1 PG (ref 27–31)
MCHC RBC AUTO-ENTMCNC: 32.7 G/DL (ref 32–36)
MCV RBC AUTO: 89 FL (ref 82–98)
MONOCYTES # BLD AUTO: 0.6 K/UL (ref 0.3–1)
MONOCYTES NFR BLD: 16.4 % (ref 4–15)
NEUTROPHILS # BLD AUTO: 2.3 K/UL (ref 1.8–7.7)
NEUTROPHILS NFR BLD: 59.7 % (ref 38–73)
NRBC BLD-RTO: 0 /100 WBC
PLATELET # BLD AUTO: 158 K/UL (ref 150–450)
PMV BLD AUTO: 9.6 FL (ref 9.2–12.9)
POCT GLUCOSE: 114 MG/DL (ref 70–110)
POCT GLUCOSE: 212 MG/DL (ref 70–110)
POCT GLUCOSE: 93 MG/DL (ref 70–110)
POTASSIUM SERPL-SCNC: 5.2 MMOL/L (ref 3.5–5.1)
PROT SERPL-MCNC: 7 G/DL (ref 6–8.4)
RBC # BLD AUTO: 3.88 M/UL (ref 4.6–6.2)
SODIUM SERPL-SCNC: 132 MMOL/L (ref 136–145)
WBC # BLD AUTO: 3.78 K/UL (ref 3.9–12.7)

## 2023-12-14 PROCEDURE — 36415 COLL VENOUS BLD VENIPUNCTURE: CPT

## 2023-12-14 PROCEDURE — 85025 COMPLETE CBC W/AUTO DIFF WBC: CPT

## 2023-12-14 PROCEDURE — 97535 SELF CARE MNGMENT TRAINING: CPT

## 2023-12-14 PROCEDURE — 11000001 HC ACUTE MED/SURG PRIVATE ROOM

## 2023-12-14 PROCEDURE — 99900035 HC TECH TIME PER 15 MIN (STAT)

## 2023-12-14 PROCEDURE — 97116 GAIT TRAINING THERAPY: CPT

## 2023-12-14 PROCEDURE — 80100014 HC HEMODIALYSIS 1:1

## 2023-12-14 PROCEDURE — 86335 IMMUNFIX E-PHORSIS/URINE/CSF: CPT | Performed by: NURSE PRACTITIONER

## 2023-12-14 PROCEDURE — A4216 STERILE WATER/SALINE, 10 ML: HCPCS

## 2023-12-14 PROCEDURE — 63600175 PHARM REV CODE 636 W HCPCS: Performed by: INTERNAL MEDICINE

## 2023-12-14 PROCEDURE — 25000003 PHARM REV CODE 250: Performed by: HOSPITALIST

## 2023-12-14 PROCEDURE — 83735 ASSAY OF MAGNESIUM: CPT

## 2023-12-14 PROCEDURE — 25000003 PHARM REV CODE 250: Performed by: INTERNAL MEDICINE

## 2023-12-14 PROCEDURE — 25000003 PHARM REV CODE 250

## 2023-12-14 PROCEDURE — 63600175 PHARM REV CODE 636 W HCPCS

## 2023-12-14 PROCEDURE — 80053 COMPREHEN METABOLIC PANEL: CPT

## 2023-12-14 PROCEDURE — 94761 N-INVAS EAR/PLS OXIMETRY MLT: CPT

## 2023-12-14 PROCEDURE — 99223 1ST HOSP IP/OBS HIGH 75: CPT | Mod: ,,, | Performed by: INTERNAL MEDICINE

## 2023-12-14 RX ORDER — SEVELAMER CARBONATE 800 MG/1
800 TABLET, FILM COATED ORAL
Status: DISCONTINUED | OUTPATIENT
Start: 2023-12-14 | End: 2023-12-18 | Stop reason: HOSPADM

## 2023-12-14 RX ORDER — MUPIROCIN 20 MG/G
OINTMENT TOPICAL 2 TIMES DAILY
Status: DISCONTINUED | OUTPATIENT
Start: 2023-12-14 | End: 2023-12-18 | Stop reason: HOSPADM

## 2023-12-14 RX ORDER — HEPARIN SODIUM 1000 [USP'U]/ML
4000 INJECTION, SOLUTION INTRAVENOUS; SUBCUTANEOUS
Status: DISCONTINUED | OUTPATIENT
Start: 2023-12-14 | End: 2023-12-18 | Stop reason: HOSPADM

## 2023-12-14 RX ADMIN — HEPARIN SODIUM 4000 UNITS: 1000 INJECTION, SOLUTION INTRAVENOUS; SUBCUTANEOUS at 01:12

## 2023-12-14 RX ADMIN — SEVELAMER CARBONATE 800 MG: 800 TABLET, FILM COATED ORAL at 04:12

## 2023-12-14 RX ADMIN — MUPIROCIN: 20 OINTMENT TOPICAL at 09:12

## 2023-12-14 RX ADMIN — HYDRALAZINE HYDROCHLORIDE 100 MG: 25 TABLET, FILM COATED ORAL at 02:12

## 2023-12-14 RX ADMIN — HYDRALAZINE HYDROCHLORIDE 100 MG: 25 TABLET, FILM COATED ORAL at 09:12

## 2023-12-14 RX ADMIN — HEPARIN SODIUM 5000 UNITS: 5000 INJECTION, SOLUTION INTRAVENOUS; SUBCUTANEOUS at 05:12

## 2023-12-14 RX ADMIN — SEVELAMER CARBONATE 800 MG: 800 TABLET, FILM COATED ORAL at 01:12

## 2023-12-14 RX ADMIN — HYDRALAZINE HYDROCHLORIDE 100 MG: 25 TABLET, FILM COATED ORAL at 08:12

## 2023-12-14 RX ADMIN — Medication 10 ML: at 02:12

## 2023-12-14 RX ADMIN — Medication 10 ML: at 05:12

## 2023-12-14 RX ADMIN — Medication 10 ML: at 09:12

## 2023-12-14 RX ADMIN — PANTOPRAZOLE SODIUM 40 MG: 40 TABLET, DELAYED RELEASE ORAL at 08:12

## 2023-12-14 RX ADMIN — NIFEDIPINE 90 MG: 30 TABLET, FILM COATED, EXTENDED RELEASE ORAL at 08:12

## 2023-12-14 RX ADMIN — HEPARIN SODIUM 5000 UNITS: 5000 INJECTION, SOLUTION INTRAVENOUS; SUBCUTANEOUS at 02:12

## 2023-12-14 RX ADMIN — INSULIN ASPART 4 UNITS: 100 INJECTION, SOLUTION INTRAVENOUS; SUBCUTANEOUS at 04:12

## 2023-12-14 NOTE — PROGRESS NOTES
INPATIENT NEPHROLOGY Progress Note   [facility]    Patient Name: Catalino Mcfadden  Date: 12/14/2023    Reason for consultation: ESRD    Chief Complaint:   Chief Complaint   Patient presents with    Dizziness    Vomiting    Fall     For several days        History of Present Illness:  Catalino Mcfadden is a 59 y/o M with past medical history significant for HTN, ESRD on HD, DM2, gastroparesis, diabetic foot ulcer, chronic hiccups, and HLD who presented to the ED for with episodic dizziness, vomiting, and lower extremity weakness following a fall x 1 day. He reports nausea has been resolved with Zofran. He denies chest pain, shortness of breath, fever, chills, abdominal pain, headaches, light-headedness, numbness, tingling, or LOC. He reports he missed his Saturday hemodialysis treatment but states last week he received HD Tues, Wed, Thurs, during hospitaliztion. He was recently discharged 12/07. In the ED blood pressure 200s/100s, received IV and PO antihypertensive medication with mild improvemnt. He states he did not take his medication yesterday due to N/V. CT Head negative. MRI brain neg. Consulted for dialysis.    Interval History:  12/12- BP better, on RA, no complaints  12/13- MRI L spine abnormal- getting w/u for malignancy vs paraprotein- neuro, neurosurg and heme/onc consulted  12/14 HD today, BP is high since admission- he says his BP isn't normally high    Plan of Care:    Assessment:  ESRD on HD TTS  HTN emergency- resolved; HTN  Hyponatremia  Hyperkalemia  SHPT  Anemia of CKD    Plan:    - continue HD TTS  - continue home BP meds- UF 3-4L- if BP still high post HD will add BP medication  - adjust dialysate- renal diet, 1.5L fluid restriction  - resumed binder with meals  - no acute CLEMENT needs    Thank you for allowing us to participate in this patient's care. We will continue to follow.    Vital Signs:  Temp Readings from Last 3 Encounters:   12/14/23 96.3 °F (35.7 °C) (Oral)   12/07/23 98.3 °F  (36.8 °C)   07/06/23 98.4 °F (36.9 °C) (Oral)       Pulse Readings from Last 3 Encounters:   12/14/23 80   12/07/23 99   12/01/23 95       BP Readings from Last 3 Encounters:   12/14/23 (!) 165/78   12/07/23 (!) 185/89   12/01/23 (!) 153/76       Weight:  Wt Readings from Last 3 Encounters:   12/14/23 90.8 kg (200 lb 2.8 oz)   12/05/23 102.1 kg (225 lb)   12/01/23 96.1 kg (211 lb 13.8 oz)       Medications:  Scheduled Meds:   amitriptyline  10 mg Oral QHS    heparin (porcine)  5,000 Units Subcutaneous Q8H    hydrALAZINE  100 mg Oral TID    mupirocin   Nasal BID    NIFEdipine  90 mg Oral Daily    pantoprazole  40 mg Oral Daily    sodium chloride 0.9%  10 mL Intravenous Q8H     Continuous Infusions:  PRN Meds:.sodium chloride 0.9%, acetaminophen, albuterol-ipratropium, aluminum-magnesium hydroxide-simethicone, chlorproMAZINE, glucagon (human recombinant), glucose, glucose, heparin (porcine), insulin aspart U-100, melatonin, naloxone, ondansetron, prochlorperazine, senna-docusate 8.6-50 mg, sodium chloride 0.9%  No current facility-administered medications on file prior to encounter.     Current Outpatient Medications on File Prior to Encounter   Medication Sig Dispense Refill    hydrALAZINE (APRESOLINE) 100 MG tablet Take 1 tablet (100 mg total) by mouth 3 (three) times daily. 90 tablet 0    NIFEdipine (PROCARDIA XL) 90 MG (OSM) 24 hr tablet Take 1 tablet (90 mg total) by mouth once daily. 90 tablet 3    promethazine (PHENERGAN) 12.5 MG Tab Take 1 tablet (12.5 mg total) by mouth every 6 (six) hours as needed (nausea and vomiting). 30 tablet 3    amitriptyline (ELAVIL) 10 MG tablet Take 1 tablet (10 mg total) by mouth every evening. (Patient not taking: Reported on 12/12/2023) 30 tablet 0    GAVILYTE-C 240-22.72-6.72 -5.84 gram SolR Take 4,000 mLs by mouth once.      heparin sod,pork in 0.45% NaCl (HEPARIN, PORCINE, 100 UNITS) 100 unit/100 mL (1 unit/mL) SolP in sodium chloride 0.45 % 100 mL infusion Inject 1 mL/hr  into the vein continuous.      methoxy peg-epoetin beta (MIRCERA INJ) Inject 50 mcg into the skin every 28 days.      sevelamer carbonate (RENVELA) 800 mg Tab Take 1 tablet (800 mg total) by mouth 3 (three) times daily with meals. (Patient not taking: Reported on 12/12/2023) 90 tablet 11    sucroferric oxyhydroxide (VELPHORO) 500 mg Chew Take 1 tablet by mouth once daily.      torsemide (DEMADEX) 20 MG Tab Take 1 tablet by mouth once daily.       Review of Systems:  Neg    Physical Exam:  General Appearance:    NAD, AAO x 3, cooperative, appears stated age   Head:    Normocephalic, atraumatic   Eyes:    PER, EOMI, and conjunctiva/sclera clear bilaterally       Mouth:   Moist mucus membranes, no thrush or oral lesions,       normal dentition   Back:     No CVA tenderness   Lungs:     Clear to auscultation bilaterally, no wheezes, crackles,           rales or rhonchi, symmetric air movement, respirations unlabored   Chest wall:    No tenderness or deformity   Heart:    Regular rate and rhythm, S1 and S2 normal, no murmur, rub   or gallop   Abdomen:     Soft, non-tender, non-distended, bowel sounds active all four   quadrants, no RT or guarding, no masses, no organomegaly   Extremities:   Warm and well perfused, distal pulses are intact, no             cyanosis or peripheral edema   MSK:   No joint or muscle swelling, tenderness or deformity   Skin:   Skin color, texture, turgor normal, no rashes or lesions   Neurologic/Psychiatric:   CNII-XII intact, normal strength and sensation       throughout, no asterixis; normal affect, memory, judgement     and insight      Results:  Lab Results   Component Value Date     (L) 12/14/2023    K 5.2 (H) 12/14/2023    CL 96 12/14/2023    CO2 23 12/14/2023    BUN 42 (H) 12/14/2023    CREATININE 9.0 (H) 12/14/2023    CALCIUM 9.0 12/14/2023    ANIONGAP 13 12/14/2023    ESTGFRAFRICA 10.4 (A) 02/23/2022    EGFRNONAA 9.0 (A) 02/23/2022       Lab Results   Component Value Date     CALCIUM 9.0 12/14/2023    PHOS 2.9 12/07/2023       Recent Labs   Lab 12/14/23  0413   WBC 3.78*   RBC 3.88*   HGB 11.3*   HCT 34.6*      MCV 89   MCH 29.1   MCHC 32.7         I have personally reviewed pertinent radiological imaging and reports.    I have spent > 35 minutes providing care for this patient for the above diagnoses. These services have included chart/data/imaging review, evaluation, exam, formulation of plan, , note preparation, and discussions with staff involved in this patient's care.    Oscar Canas MD MPH  [facility] [place]  [address] [place] [id number]  [phone] (p)  [phone] (f)

## 2023-12-14 NOTE — PLAN OF CARE
Problem: Adult Inpatient Plan of Care  Goal: Plan of Care Review  Outcome: Ongoing, Progressing     Problem: Adult Inpatient Plan of Care  Goal: Optimal Comfort and Wellbeing  Outcome: Ongoing, Progressing     Problem: Diabetes Comorbidity  Goal: Blood Glucose Level Within Targeted Range  Outcome: Ongoing, Progressing     Problem: Infection  Goal: Absence of Infection Signs and Symptoms  Outcome: Ongoing, Progressing     Problem: Fall Injury Risk  Goal: Absence of Fall and Fall-Related Injury  Outcome: Ongoing, Progressing     Problem: Coping Ineffective  Goal: Effective Coping  Outcome: Ongoing, Progressing

## 2023-12-14 NOTE — CARE UPDATE
12/13/23 2015   Patient Assessment/Suction   Level of Consciousness (AVPU) alert   Respiratory Effort Normal;Unlabored   PRE-TX-O2   Device (Oxygen Therapy) room air   SpO2 96 %   Pulse Oximetry Type Intermittent   Pulse 76   Resp 18   Aerosol Therapy   $ Aerosol Therapy Charges PRN treatment not required   Respiratory Treatment Status (SVN) PRN treatment not required

## 2023-12-14 NOTE — ASSESSMENT & PLAN NOTE
Creatine stable for now. BMP reviewed- noted Estimated Creatinine Clearance: 10 mL/min (A) (based on SCr of 9 mg/dL (H)). according to latest data. Based on current GFR, CKD stage is end stage.  Monitor UOP and serial BMP and adjust therapy as needed. Renally dose meds. Avoid nephrotoxic medications and procedures.  -AV fistula noted without complications  -dialysis ongoing  -nephrology following dialysis today

## 2023-12-14 NOTE — SUBJECTIVE & OBJECTIVE
INTERVAL HISTORY: weakness - spinal lesions concerning for metastatic disease - impaired gait - PT/OT following recommends moderate intensity therapy anticipate snf discharge when ready - dialysis today      Past Medical History:   Diagnosis Date    Diabetes mellitus, type 2     Diabetic foot ulcer associated with diabetes mellitus due to underlying condition 07/16/2020    ESRD on hemodialysis     Hyperlipidemia     Hypertension     Obese        Past Surgical History:   Procedure Laterality Date    AV FISTULA PLACEMENT Left 07/06/2023    Procedure: CREATION, AV FISTULA;  Surgeon: Daniel Poon MD;  Location: [facility];  Service: Vascular;  Laterality: Left;  RN PREOP 6/28/2023  LABS DAY OF SURGERY    BONE BIOPSY Left 07/17/2020    Procedure: BIOPSY, BONE;  Surgeon: Verona Whitmore DPM;  Location: [facility] OR;  Service: Podiatry;  Laterality: Left;    CERVICAL DISC SURGERY  07/11/2016    DEBRIDEMENT Left 07/17/2020    Procedure: DEBRIDEMENT;  Surgeon: Verona Whitmore DPM;  Location: [facility] OR;  Service: Podiatry;  Laterality: Left;    DEBRIDEMENT OF FOOT Right     toes & plantar surface    ESOPHAGEAL MANOMETRY WITH MEASUREMENT OF IMPEDANCE N/A 03/27/2023    Procedure: MANOMETRY, ESOPHAGUS, WITH IMPEDANCE MEASUREMENT;  Surgeon: Roopa Pérez MD;  Location: [facility] ([facility]);  Service: Endoscopy;  Laterality: N/A;  Belching and rumination protocol please  instructions via portal - sm    ESOPHAGOGASTRODUODENOSCOPY N/A 12/16/2022    Procedure: EGD (ESOPHAGOGASTRODUODENOSCOPY);  Surgeon: Eren Francois MD;  Location: [facility];  Service: Endoscopy;  Laterality: N/A;  Pt. on Dialysis. K+ ordered prior to procedure.EC    ESOPHAGOGASTRODUODENOSCOPY N/A 12/5/2023    Procedure: EGD (ESOPHAGOGASTRODUODENOSCOPY);  Surgeon: Kash Johnston MD;  Location: [facility];  Service: Endoscopy;  Laterality: N/A;    FRACTURE SURGERY Right     medial ankle, metal plate present    INSERTION OF TUNNELED CENTRAL VENOUS CATHETER  (CVC) WITH SUBCUTANEOUS PORT N/A 06/11/2021    Procedure: TUNNEL CATH INSERTION WITHOUT PORT;  Surgeon: Jose Manuel Diagnostic Provider;  Location: [facility] OR;  Service: Radiology;  Laterality: N/A;  11AM START---PHONE PREOP 6/10/21---COVID POSTIVE ON 7/2020---NO S/S    left leg orthopedic surgery Left     UPPER GASTROINTESTINAL ENDOSCOPY         Review of patient's allergies indicates:  No Known Allergies    No current facility-administered medications on file prior to encounter.     Current Outpatient Medications on File Prior to Encounter   Medication Sig    hydrALAZINE (APRESOLINE) 100 MG tablet Take 1 tablet (100 mg total) by mouth 3 (three) times daily.    NIFEdipine (PROCARDIA XL) 90 MG (OSM) 24 hr tablet Take 1 tablet (90 mg total) by mouth once daily.    promethazine (PHENERGAN) 12.5 MG Tab Take 1 tablet (12.5 mg total) by mouth every 6 (six) hours as needed (nausea and vomiting).    amitriptyline (ELAVIL) 10 MG tablet Take 1 tablet (10 mg total) by mouth every evening. (Patient not taking: Reported on 12/12/2023)    GAVILYTE-C 240-22.72-6.72 -5.84 gram SolR Take 4,000 mLs by mouth once.    heparin sod,pork in 0.45% NaCl (HEPARIN, PORCINE, 100 UNITS) 100 unit/100 mL (1 unit/mL) SolP in sodium chloride 0.45 % 100 mL infusion Inject 1 mL/hr into the vein continuous.    methoxy peg-epoetin beta (MIRCERA INJ) Inject 50 mcg into the skin every 28 days.    sevelamer carbonate (RENVELA) 800 mg Tab Take 1 tablet (800 mg total) by mouth 3 (three) times daily with meals. (Patient not taking: Reported on 12/12/2023)    sucroferric oxyhydroxide (VELPHORO) 500 mg Chew Take 1 tablet by mouth once daily.    torsemide (DEMADEX) 20 MG Tab Take 1 tablet by mouth once daily.     Family History       Problem Relation (Age of Onset)    Heart disease Father          Tobacco Use    Smoking status: Never    Smokeless tobacco: Never   Substance and Sexual Activity    Alcohol use: Not Currently     Comment: occ rare beer    Drug use: No     Sexual activity: Not Currently     Review of Systems   Constitutional:  Positive for activity change and fatigue. Negative for appetite change, chills, diaphoresis and fever.   HENT: Negative.     Eyes: Negative.    Respiratory:  Negative for cough, shortness of breath and wheezing.    Cardiovascular:  Negative for chest pain and leg swelling.   Gastrointestinal:  Negative for abdominal distention, abdominal pain, constipation, diarrhea, nausea and vomiting.   Endocrine: Negative.    Genitourinary:  Negative for difficulty urinating and dysuria.   Musculoskeletal:  Negative for arthralgias, back pain and myalgias.   Neurological:  Positive for weakness (bilateral lower extremity). Negative for syncope, facial asymmetry, speech difficulty, light-headedness, numbness and headaches.   Hematological: Negative.    Psychiatric/Behavioral: Negative.       Objective:     Vital Signs (Most Recent):  Temp: 97.6 °F (36.4 °C) (12/14/23 1506)  Pulse: 84 (12/14/23 1506)  Resp: 18 (12/14/23 1506)  BP: 135/72 (12/14/23 1506)  SpO2: 99 % (12/14/23 1506) Vital Signs (24h Range):  Temp:  [96.3 °F (35.7 °C)-98.5 °F (36.9 °C)] 97.6 °F (36.4 °C)  Pulse:  [56-91] 84  Resp:  [17-18] 18  SpO2:  [95 %-99 %] 99 %  BP: (104-181)/(72-98) 135/72     Weight: 90.8 kg (200 lb 2.8 oz)  Body mass index is 26.41 kg/m².     Physical Exam  Vitals and nursing note reviewed.   Constitutional:       General: He is not in acute distress.     Appearance: Normal appearance. He is not toxic-appearing.   Cardiovascular:      Rate and Rhythm: Normal rate and regular rhythm.      Heart sounds: No murmur heard.     No gallop.   Pulmonary:      Effort: Pulmonary effort is normal. No respiratory distress.      Breath sounds: Normal breath sounds. No wheezing.   Abdominal:      General: Bowel sounds are normal. There is no distension.      Palpations: Abdomen is soft.      Tenderness: There is no abdominal tenderness.   Skin:     General: Skin is warm and dry.       Capillary Refill: Capillary refill takes less than 2 seconds.      Comments: Left upper arm AV Fistula noted with positive bruit and thrill   Neurological:      Mental Status: He is alert and oriented to person, place, and time. Mental status is at baseline.      GCS: GCS eye subscore is 4. GCS verbal subscore is 5. GCS motor subscore is 6.      Sensory: Sensation is intact.      Motor: Weakness (bilateral lower extremity) present.      Comments: Reports unable to lift legs against gravity due to weakness   Psychiatric:         Mood and Affect: Mood normal.                Significant Labs: All pertinent labs within the past 24 hours have been reviewed.  BMP:   Recent Labs   Lab 12/14/23 0413   GLU 82   *   K 5.2*   CL 96   CO2 23   BUN 42*   CREATININE 9.0*   CALCIUM 9.0   MG 2.2       CBC:   Recent Labs   Lab 12/13/23 0412 12/14/23 0413   WBC 4.45 3.78*   HGB 11.4* 11.3*   HCT 35.3* 34.6*    158       CMP:   Recent Labs   Lab 12/13/23 0412 12/14/23 0413   * 132*   K 4.6 5.2*   CL 97 96   CO2 24 23   GLU 84 82   BUN 35* 42*   CREATININE 7.9* 9.0*   CALCIUM 9.1 9.0   PROT 7.3 7.0   ALBUMIN 2.6* 2.5*   BILITOT 1.4* 1.4*   ALKPHOS 705* 688*   AST 50* 53*   ALT 28 31   ANIONGAP 12 13         Significant Imaging:     Imaging Results              CT Head Without Contrast (Final result)  Result time 12/12/23 07:46:24      Final result by Jj Serna MD (12/12/23 07:46:24)                   Impression:      No evidence of an acute intracranial abnormality.    Final read.      Electronically signed by: Jj Serna  Date:    12/12/2023  Time:    07:46               Narrative:    EXAMINATION:  CT HEAD WITHOUT CONTRAST    CLINICAL HISTORY:  Dizziness, persistent/recurrent, cardiac or vascular cause suspected;    TECHNIQUE:  Low dose axial images were obtained through the head.  Coronal and sagittal reformations were also performed. Contrast was not  administered.    COMPARISON:  02/22/2023    FINDINGS:  Ventricles and sulci are normal in size for age without evidence of hydrocephalus.    Mild scattered hypoattenuation within the supratentorial white matter, nonspecific but probably reflecting sequelae of chronic microvascular ischemic change.   No evidence of acute parenchymal hemorrhage, edema, significant mass effect or major vascular distribution infarct.    No extra-axial blood or fluid collections.    No displaced calvarial fracture.    The mastoid air cells and visualized paranasal sinuses are essentially clear.    Calcific atherosclerosis of the internal carotid and vertebral arteries at the skull base.                                       X-Ray Chest AP Portable (Final result)  Result time 12/12/23 08:31:55      Final result by Angy Beckford MD (12/12/23 08:31:55)                   Impression:      Improvement in the appearance of the chest with decrease of lung infiltrates suggesting improving pulmonary edema      Electronically signed by: Angy Beckford MD  Date:    12/12/2023  Time:    08:31               Narrative:    EXAMINATION:  XR CHEST AP PORTABLE    CLINICAL HISTORY:  Dizziness and giddiness    TECHNIQUE:  Single frontal view of the chest was performed.    COMPARISON:  12/04/2023    FINDINGS:  Stable cardiomediastinal silhouette.  Appear mildly enlarged as before.  Right-sided internal jugular venous catheter remains in place with the distal tip at the level of the caval atrial junction.  Prominent lung markings suggesting mild pulmonary vascular distention with this is decreased compared to the prior exam.  No pleural effusion

## 2023-12-14 NOTE — PROGRESS NOTES
4000 ml net uf removed   12/14/23 1338   Required for all Hemodialysis Patients   Hepatitis Status negative   Handoff Report   Received From [facility]   Given [facility] Hernandez   Treatment Type   Treatment Type Maintenance   Vital Signs   Pulse 86   Resp 18   /82   Assessments (Pre/Post)   Safety vein preservation armband present   Date Hepatitis Profile Obtained 12/05/23   Blood Liters Processed (BLP) 57.8   Transport Modality ambulatory   Level of Consciousness (AVPU) alert   Dialyzer Clearance mildly streaked   Pain   Preferred Pain Scale number (Numeric Rating Pain Scale)   Pain Rating (0-10): Rest 0        Hemodialysis Catheter right subclavian   No placement date or time found.   Present Prior to Hospital Arrival?: Yes  Hemodialysis Catheter Type: Tunneled catheter  Location: right subclavian  Placement Verification: Blood return   Line Necessity Review CRRT/HD   Site Assessment No drainage;No swelling;No redness;No warmth   Line Securement Device Secured with sutures   Dressing Type CHG impregnated dressing/sponge   Dressing Status Clean;Dry;Intact   Dressing Intervention Integrity maintained   Date on Dressing 12/12/23   Dressing Due to be Changed 12/17/23   Venous Patency/Care accessed;heparin locked;deaccessed   Arterial Patency/Care accessed;heparin locked;deaccessed   Post-Hemodialysis Assessment   Rinseback Volume (mL) 250 mL   Blood Volume Processed (Liters) 57 L   Dialyzer Clearance Lightly streaked   Duration of Treatment 180 minutes   Additional Fluid Intake (mL) 500 mL   Total UF (mL) 4500 mL   Net Fluid Removal 4000   Patient Response to Treatment tolerated well   Post-Treatment Weight 86.8 kg (191 lb 5.8 oz)   Treatment Weight Change -4   Post-Hemodialysis Comments tx completed   Edema   Edema generalized     Educated on diet

## 2023-12-14 NOTE — ASSESSMENT & PLAN NOTE
Concerning MRI spine for metas tic disease  Ongoing workup Onclology, Neurology, and Nephrology following  Neurosurgery consulted and following paraesthesias   Awaiting oncology opinion - labs pending immunofixation/geta 2 micro/protein electroph  PT/OT following anticipate snf discharge when ready

## 2023-12-14 NOTE — ASSESSMENT & PLAN NOTE
Resolved - controlled  Patient has a current diagnosis of Hypertensive emergency with end organ damage evidenced by acute kidney injury which is uncontrolled.  Latest blood pressure and vitals reviewed-   Temp:  [96.3 °F (35.7 °C)-98.5 °F (36.9 °C)]   Pulse:  [56-91]   Resp:  [17-18]   BP: (104-181)/(72-98)   SpO2:  [95 %-99 %] .   Patient currently on IV antihypertensives.   Home meds for hypertension were reviewed and noted below.   Hypertension Medications               hydrALAZINE (APRESOLINE) 100 MG tablet Take 1 tablet (100 mg total) by mouth 3 (three) times daily.    NIFEdipine (PROCARDIA XL) 90 MG (OSM) 24 hr tablet Take 1 tablet (90 mg total) by mouth once daily.    torsemide (DEMADEX) 20 MG Tab Take 1 tablet by mouth once daily.              Will aim for controlled BP reduction by medications noted above. Monitor and mitigate end organ damage as indicated.  BP improved with re-introducing home meds  Follow BP trends and adjust as warranted

## 2023-12-14 NOTE — PROGRESS NOTES
"[facility] Medicine  Progress Note    Patient Name: Catalino Mcfadden  MRN: [id number]  Patient Class: IP- Inpatient   Admission Date: 12/11/2023  Length of Stay: 1 days  Attending Physician: Judy Lane MD  Primary Care Provider: Marty Rodríguez PA-C        Subjective:     Principal Problem:Abnormal MRI, lumbar spine        HPI:  Catalino Mcfadden is a 59 y/o M with past medical history significant for HTN, ESRD on HD, DM2, gastroparesis, diabetic foot ulcer, chronic hiccups, and HLD who presented to the ED for with episodic dizziness, vomiting, and lower extremity weakness following a fall x 1 day. He reports nausea has been resolved with Zofran. He denies chest pain, shortness of breath, fever, chills, abdominal pain, headaches, light-headedness, numbness, tingling, or LOC. He reports he missed his Saturday hemodialysis treatment but states last week he received HD Tues, Wed, Thurs, during hospitaliztion. He was recently discharged 12/07. Lab work reflective of ESRD with elevated bili, ALP, and AST. In the ED blood pressure 200s/100s, received IV and PO antihypertensive medication with mild improvemnt. He states he did not take his medication yesterday due to N/V. During examination patient states he is unable to lift his lower extremities, however, was able to walk into the ED with assistance from a friend. CT Head negative. Admitted to hospital medicine for hypertensive emergency evaluation and treatment with nephrology and neurology consults placed.                     Overview/Hospital Course:  Patient presented with hypertensive emergency not taking medication for blood pressure.  He reports nausea which has resolved in early morning he was able to tolerate breakfast.  Patient had also reported a fall at home a few days prior to presentation.  MRI lumbar spine obtained that showed "1. Multiple scattered osseous lesions throughout the visualized lumbosacral spine and iliac bones, " "nonspecific, but given findings on comparison chest CT from 03/22/2023 are concerning for metastatic disease.  Further evaluation recommended.   2. Mild multilevel degenerative change of the lumbar spine, as detailed above, most pronounced at L3-4 through L5-S1 and resulting in mild neural foraminal stenosis.  No significant spinal canal stenosis at any lumbar level"     Neurology following - workup ongoing. Nephrology following - HD. Oncology - workup ongoing (abnormal lesion vs multiple myelomal - Notes if myeloma workup is positive he will need bone marrow biopsy. Palliative care consult code status - anticipate SNF discharge    INTERVAL HISTORY: weakness - spinal lesions concerning for metastatic disease - impaired gait - PT/OT following recommends moderate intensity therapy anticipate snf discharge when ready - dialysis today      Past Medical History:   Diagnosis Date    Diabetes mellitus, type 2     Diabetic foot ulcer associated with diabetes mellitus due to underlying condition 07/16/2020    ESRD on hemodialysis     Hyperlipidemia     Hypertension     Obese        Past Surgical History:   Procedure Laterality Date    AV FISTULA PLACEMENT Left 07/06/2023    Procedure: CREATION, AV FISTULA;  Surgeon: Daniel Poon MD;  Location: [facility] OR;  Service: Vascular;  Laterality: Left;  RN PREOP 6/28/2023  LABS DAY OF SURGERY    BONE BIOPSY Left 07/17/2020    Procedure: BIOPSY, BONE;  Surgeon: Verona Whitmore DPM;  Location: [facility] OR;  Service: Podiatry;  Laterality: Left;    CERVICAL DISC SURGERY  07/11/2016    DEBRIDEMENT Left 07/17/2020    Procedure: DEBRIDEMENT;  Surgeon: Verona Whitmore DPM;  Location: [facility] OR;  Service: Podiatry;  Laterality: Left;    DEBRIDEMENT OF FOOT Right     toes & plantar surface    ESOPHAGEAL MANOMETRY WITH MEASUREMENT OF IMPEDANCE N/A 03/27/2023    Procedure: MANOMETRY, ESOPHAGUS, WITH IMPEDANCE MEASUREMENT;  Surgeon: Roopa Pérez MD;  Location: [facility] ENDO (4TH FLR);  " Service: Endoscopy;  Laterality: N/A;  Belching and rumination protocol please  instructions via portal - sm    ESOPHAGOGASTRODUODENOSCOPY N/A 12/16/2022    Procedure: EGD (ESOPHAGOGASTRODUODENOSCOPY);  Surgeon: Eren Francois MD;  Location: [facility] ENDO;  Service: Endoscopy;  Laterality: N/A;  Pt. on Dialysis. K+ ordered prior to procedure.EC    ESOPHAGOGASTRODUODENOSCOPY N/A 12/5/2023    Procedure: EGD (ESOPHAGOGASTRODUODENOSCOPY);  Surgeon: Kash Johnston MD;  Location: [facility] ENDO;  Service: Endoscopy;  Laterality: N/A;    FRACTURE SURGERY Right     medial ankle, metal plate present    INSERTION OF TUNNELED CENTRAL VENOUS CATHETER (CVC) WITH SUBCUTANEOUS PORT N/A 06/11/2021    Procedure: TUNNEL CATH INSERTION WITHOUT PORT;  Surgeon: Jose Manuel Diagnostic Provider;  Location: [facility] OR;  Service: Radiology;  Laterality: N/A;  11AM START---PHONE PREOP 6/10/21---COVID POSTIVE ON 7/2020---NO S/S    left leg orthopedic surgery Left     UPPER GASTROINTESTINAL ENDOSCOPY         Review of patient's allergies indicates:  No Known Allergies    No current facility-administered medications on file prior to encounter.     Current Outpatient Medications on File Prior to Encounter   Medication Sig    hydrALAZINE (APRESOLINE) 100 MG tablet Take 1 tablet (100 mg total) by mouth 3 (three) times daily.    NIFEdipine (PROCARDIA XL) 90 MG (OSM) 24 hr tablet Take 1 tablet (90 mg total) by mouth once daily.    promethazine (PHENERGAN) 12.5 MG Tab Take 1 tablet (12.5 mg total) by mouth every 6 (six) hours as needed (nausea and vomiting).    amitriptyline (ELAVIL) 10 MG tablet Take 1 tablet (10 mg total) by mouth every evening. (Patient not taking: Reported on 12/12/2023)    GAVILYTE-C 240-22.72-6.72 -5.84 gram SolR Take 4,000 mLs by mouth once.    heparin sod,pork in 0.45% NaCl (HEPARIN, PORCINE, 100 UNITS) 100 unit/100 mL (1 unit/mL) SolP in sodium chloride 0.45 % 100 mL infusion Inject 1 mL/hr into the vein continuous.    methoxy  peg-epoetin beta (MIRCERA INJ) Inject 50 mcg into the skin every 28 days.    sevelamer carbonate (RENVELA) 800 mg Tab Take 1 tablet (800 mg total) by mouth 3 (three) times daily with meals. (Patient not taking: Reported on 12/12/2023)    sucroferric oxyhydroxide (VELPHORO) 500 mg Chew Take 1 tablet by mouth once daily.    torsemide (DEMADEX) 20 MG Tab Take 1 tablet by mouth once daily.     Family History       Problem Relation (Age of Onset)    Heart disease Father          Tobacco Use    Smoking status: Never    Smokeless tobacco: Never   Substance and Sexual Activity    Alcohol use: Not Currently     Comment: occ rare beer    Drug use: No    Sexual activity: Not Currently     Review of Systems   Constitutional:  Positive for activity change and fatigue. Negative for appetite change, chills, diaphoresis and fever.   HENT: Negative.     Eyes: Negative.    Respiratory:  Negative for cough, shortness of breath and wheezing.    Cardiovascular:  Negative for chest pain and leg swelling.   Gastrointestinal:  Negative for abdominal distention, abdominal pain, constipation, diarrhea, nausea and vomiting.   Endocrine: Negative.    Genitourinary:  Negative for difficulty urinating and dysuria.   Musculoskeletal:  Negative for arthralgias, back pain and myalgias.   Neurological:  Positive for weakness (bilateral lower extremity). Negative for syncope, facial asymmetry, speech difficulty, light-headedness, numbness and headaches.   Hematological: Negative.    Psychiatric/Behavioral: Negative.       Objective:     Vital Signs (Most Recent):  Temp: 97.6 °F (36.4 °C) (12/14/23 1506)  Pulse: 84 (12/14/23 1506)  Resp: 18 (12/14/23 1506)  BP: 135/72 (12/14/23 1506)  SpO2: 99 % (12/14/23 1506) Vital Signs (24h Range):  Temp:  [96.3 °F (35.7 °C)-98.5 °F (36.9 °C)] 97.6 °F (36.4 °C)  Pulse:  [56-91] 84  Resp:  [17-18] 18  SpO2:  [95 %-99 %] 99 %  BP: (104-181)/(72-98) 135/72     Weight: 90.8 kg (200 lb 2.8 oz)  Body mass index is 26.41  kg/m².     Physical Exam  Vitals and nursing note reviewed.   Constitutional:       General: He is not in acute distress.     Appearance: Normal appearance. He is not toxic-appearing.   Cardiovascular:      Rate and Rhythm: Normal rate and regular rhythm.      Heart sounds: No murmur heard.     No gallop.   Pulmonary:      Effort: Pulmonary effort is normal. No respiratory distress.      Breath sounds: Normal breath sounds. No wheezing.   Abdominal:      General: Bowel sounds are normal. There is no distension.      Palpations: Abdomen is soft.      Tenderness: There is no abdominal tenderness.   Skin:     General: Skin is warm and dry.      Capillary Refill: Capillary refill takes less than 2 seconds.      Comments: Left upper arm AV Fistula noted with positive bruit and thrill   Neurological:      Mental Status: He is alert and oriented to person, place, and time. Mental status is at baseline.      GCS: GCS eye subscore is 4. GCS verbal subscore is 5. GCS motor subscore is 6.      Sensory: Sensation is intact.      Motor: Weakness (bilateral lower extremity) present.      Comments: Reports unable to lift legs against gravity due to weakness   Psychiatric:         Mood and Affect: Mood normal.                Significant Labs: All pertinent labs within the past 24 hours have been reviewed.  BMP:   Recent Labs   Lab 12/14/23 0413   GLU 82   *   K 5.2*   CL 96   CO2 23   BUN 42*   CREATININE 9.0*   CALCIUM 9.0   MG 2.2       CBC:   Recent Labs   Lab 12/13/23 0412 12/14/23 0413   WBC 4.45 3.78*   HGB 11.4* 11.3*   HCT 35.3* 34.6*    158       CMP:   Recent Labs   Lab 12/13/23 0412 12/14/23 0413   * 132*   K 4.6 5.2*   CL 97 96   CO2 24 23   GLU 84 82   BUN 35* 42*   CREATININE 7.9* 9.0*   CALCIUM 9.1 9.0   PROT 7.3 7.0   ALBUMIN 2.6* 2.5*   BILITOT 1.4* 1.4*   ALKPHOS 705* 688*   AST 50* 53*   ALT 28 31   ANIONGAP 12 13         Significant Imaging:     Imaging Results              CT Head  Without Contrast (Final result)  Result time 12/12/23 07:46:24      Final result by Jj Serna MD (12/12/23 07:46:24)                   Impression:      No evidence of an acute intracranial abnormality.    Final read.      Electronically signed by: Jj Serna  Date:    12/12/2023  Time:    07:46               Narrative:    EXAMINATION:  CT HEAD WITHOUT CONTRAST    CLINICAL HISTORY:  Dizziness, persistent/recurrent, cardiac or vascular cause suspected;    TECHNIQUE:  Low dose axial images were obtained through the head.  Coronal and sagittal reformations were also performed. Contrast was not administered.    COMPARISON:  02/22/2023    FINDINGS:  Ventricles and sulci are normal in size for age without evidence of hydrocephalus.    Mild scattered hypoattenuation within the supratentorial white matter, nonspecific but probably reflecting sequelae of chronic microvascular ischemic change.   No evidence of acute parenchymal hemorrhage, edema, significant mass effect or major vascular distribution infarct.    No extra-axial blood or fluid collections.    No displaced calvarial fracture.    The mastoid air cells and visualized paranasal sinuses are essentially clear.    Calcific atherosclerosis of the internal carotid and vertebral arteries at the skull base.                                       X-Ray Chest AP Portable (Final result)  Result time 12/12/23 08:31:55      Final result by Angy Beckford MD (12/12/23 08:31:55)                   Impression:      Improvement in the appearance of the chest with decrease of lung infiltrates suggesting improving pulmonary edema      Electronically signed by: Angy Beckford MD  Date:    12/12/2023  Time:    08:31               Narrative:    EXAMINATION:  XR CHEST AP PORTABLE    CLINICAL HISTORY:  Dizziness and giddiness    TECHNIQUE:  Single frontal view of the chest was performed.    COMPARISON:  12/04/2023    FINDINGS:  Stable cardiomediastinal silhouette.  Appear mildly  enlarged as before.  Right-sided internal jugular venous catheter remains in place with the distal tip at the level of the caval atrial junction.  Prominent lung markings suggesting mild pulmonary vascular distention with this is decreased compared to the prior exam.  No pleural effusion                                        Assessment/Plan:      * Abnormal MRI, lumbar spine  Concerning MRI spine for metas tic disease  Ongoing workup Onclology, Neurology, and Nephrology following  Neurosurgery consulted and following paraesthesias   Awaiting oncology opinion - labs pending immunofixation/geta 2 micro/protein electroph  PT/OT following anticipate snf discharge when ready      Hypertensive emergency  Resolved - controlled  Patient has a current diagnosis of Hypertensive emergency with end organ damage evidenced by acute kidney injury which is uncontrolled.  Latest blood pressure and vitals reviewed-   Temp:  [96.3 °F (35.7 °C)-98.5 °F (36.9 °C)]   Pulse:  [56-91]   Resp:  [17-18]   BP: (104-181)/(72-98)   SpO2:  [95 %-99 %] .   Patient currently on IV antihypertensives.   Home meds for hypertension were reviewed and noted below.   Hypertension Medications               hydrALAZINE (APRESOLINE) 100 MG tablet Take 1 tablet (100 mg total) by mouth 3 (three) times daily.    NIFEdipine (PROCARDIA XL) 90 MG (OSM) 24 hr tablet Take 1 tablet (90 mg total) by mouth once daily.    torsemide (DEMADEX) 20 MG Tab Take 1 tablet by mouth once daily.              Will aim for controlled BP reduction by medications noted above. Monitor and mitigate end organ damage as indicated.  BP improved with re-introducing home meds  Follow BP trends and adjust as warranted    ESRD on hemodialysis  Creatine stable for now. BMP reviewed- noted Estimated Creatinine Clearance: 10 mL/min (A) (based on SCr of 9 mg/dL (H)). according to latest data. Based on current GFR, CKD stage is end stage.  Monitor UOP and serial BMP and adjust therapy as needed.  "Renally dose meds. Avoid nephrotoxic medications and procedures.  -AV fistula noted without complications  -dialysis ongoing  -nephrology following dialysis today    Lower extremity weakness  Possibly due to hypertensive emergency; patient states unable to lift legs against gravity  S/p fall at home due to bilateral lower extremity weakness  CT head negative  Neurology consult placed      Serum total bilirubin elevated  Chronic condition noted  Monitor bmp    Intractable hiccups  Chronic condition  Continue prn thorazine       Type 2 diabetes mellitus with stage 5 chronic kidney disease  Patient's FSGs are controlled on current medication regimen.  Last A1c reviewed-   Lab Results   Component Value Date    HGBA1C 5.1 02/22/2023     Most recent fingerstick glucose reviewed- No results for input(s): "POCTGLUCOSE" in the last 24 hours.  Current correctional scale  Medium  Maintain anti-hyperglycemic dose as follows-   Antihyperglycemics (From admission, onward)      Start     Stop Route Frequency Ordered    12/12/23 0054  insulin aspart U-100 pen 0-10 Units         -- SubQ Before meals & nightly PRN 12/12/23 0054          Hold Oral hypoglycemics while patient is in the hospital.      VTE Risk Mitigation (From admission, onward)           Ordered     heparin (porcine) injection 4,000 Units  As needed (PRN)         12/14/23 0837     heparin (porcine) injection 5,000 Units  Every 8 hours         12/12/23 0054     IP VTE HIGH RISK PATIENT  Once         12/12/23 0054     Place sequential compression device  Until discontinued         12/12/23 0054                    Discharge Planning   WILMER: 12/18/2023     Code Status: Full Code   Is the patient medically ready for discharge?:     Reason for patient still in hospital (select all that apply): Patient trending condition, Consult recommendations, and Other (specify) on going workup in progress  Discharge Plan A: Home   Discharge Delays: None known at this " time              Belén Christine, FNP  Department of Hospital Medicine   [facility]/Surg

## 2023-12-14 NOTE — PT/OT/SLP PROGRESS
Physical Therapy Treatment    Patient Name:  Catalino Mcfadden   MRN:  [id number]    Recommendations:     Discharge Recommendations: High Intensity Therapy  Discharge Equipment Recommendations: other (see comments) (TBD)  Barriers to discharge: None    Assessment:     Catalino Mcfadden is a 58 y.o. male admitted with a medical diagnosis of Abnormal MRI, lumbar spine.  He presents with the following impairments/functional limitations: weakness, impaired endurance, impaired functional mobility, gait instability, impaired balance, decreased lower extremity function, abnormal tone, decreased ROM, edema .  Patient agreeable to PT treatment this morning.  Patient presented supine in bed and was able to transfer to sitting with SBA and then stood with min assist and a RW.  Patient then ambulated x 500 feet RW min assist due to slight balance issues.  Patient remains a fall risk with mobility.    Rehab Prognosis: Good; patient would benefit from acute skilled PT services to address these deficits and reach maximum level of function.    Recent Surgery: * No surgery found *      Plan:     During this hospitalization, patient to be seen daily to address the identified rehab impairments via gait training, therapeutic activities, therapeutic exercises and progress toward the following goals:    Plan of Care Expires:  01/17/24    Subjective     Chief Complaint: weakness  Patient/Family Comments/goals: go to facility for rehab  Pain/Comfort:         Objective:     Communicated with nurse prior to session.  Patient found supine with bed alarm upon PT entry to room.     General Precautions: Standard, fall  Orthopedic Precautions: N/A  Braces:    Respiratory Status: Room air     Functional Mobility:  Bed Mobility:     Supine to Sit: stand by assistance  Transfers:     Sit to Stand:  minimum assistance with rolling walker  Gait: x 500 feet rW min assist      AM-PAC 6 CLICK MOBILITY          Treatment & Education:  Gait training x 500 feet RW min  assist due to slight balance issues    Patient left up in chair with call button in reach, chair alarm on, and nurse notified..    GOALS:   Multidisciplinary Problems       Physical Therapy Goals          Problem: Physical Therapy    Goal Priority Disciplines Outcome Goal Variances Interventions   Physical Therapy Goal     PT, PT/OT Ongoing, Progressing     Description: Goals to be met by: 23     Patient will increase functional independence with mobility by performin. Supine to sit with Stand-by Assistance  2. Sit to supine with Stand-by Assistance  3. Sit to stand transfer with Contact Guard Assistance  4. Bed to chair transfer with Contact Guard Assistance using Rolling Walker  5. Gait  x 25 feet with Contact Guard Assistance using Rolling Walker.                          Time Tracking:     PT Received On: 23  PT Start Time: 40     PT Stop Time: 858  PT Total Time (min): 18 min     Billable Minutes: Gait Training 18    Treatment Type: Treatment  PT/PTA: PT     Number of PTA visits since last PT visit: 0     2023

## 2023-12-14 NOTE — PT/OT/SLP PROGRESS
Occupational Therapy   Treatment    Name: Catalino Mcfadden  MRN: [id number]  Admitting Diagnosis:  Abnormal MRI, lumbar spine       Recommendations:     Discharge Recommendations: High Intensity Therapy  Discharge Equipment Recommendations:  other (see comments) (TBD)  Barriers to discharge:       Assessment:     Catalino Mcfadden is a 58 y.o. male with a medical diagnosis of Abnormal MRI, lumbar spine. Performance deficits affecting function are weakness, impaired endurance, impaired self care skills, impaired functional mobility, gait instability, impaired balance, decreased lower extremity function, decreased coordination, decreased safety awareness.     Rehab Prognosis:  Good; patient would benefit from acute skilled OT services to address these deficits and reach maximum level of function.       Plan:     Patient to be seen 5 x/week to address the above listed problems via self-care/home management, therapeutic activities, therapeutic exercises  Plan of Care Expires: 01/09/24  Plan of Care Reviewed with: patient    Subjective     Chief Complaint: none  Patient/Family Comments/goals: none  Pain/Comfort:  Pain Rating 1: 0/10  Pain Rating Post-Intervention 1: 0/10    Objective:     Communicated with: nurse Hernandez prior to session.  Patient found up in chair with   upon OT entry to room.    General Precautions: Standard, fall    Orthopedic Precautions:N/A  Braces: N/A  Respiratory Status: Room air     Occupational Performance:     Functional Mobility/Transfers:  Patient completed Sit <> Stand Transfer with stand by assistance  with  no assistive device   Patient completed Toilet Transfer Stand Pivot technique with stand by assistance with  rolling walker  Functional Mobility: Noted minimal unsteadiness when ambulating in room. Patient with two episodes of slight LOB to R side when ambulating, but patient was able self-right posture without assistance.    Activities of Daily Living:  Grooming: stand by assistance with oral and  facial hygiene while standing at sink      [facility] 6 Click ADL:      Treatment & Education:  OT ed patient on safety with walker use for functional mobility with cues for hand placement & sequencing.       Patient left up in chair with all lines intact and call button in reach    GOALS:   Multidisciplinary Problems       Occupational Therapy Goals          Problem: Occupational Therapy    Goal Priority Disciplines Outcome Interventions   Occupational Therapy Goal     OT, PT/OT     Description: Goals to be met by: 1/9/24     Patient will increase functional independence with ADLs by performing:    Feeding with Modified [place].  UE Dressing with Modified [place].  LE Dressing with Modified [place].  Grooming while standing at sink with Modified [place].  Toileting from raised toilet with Modified [place] for hygiene and clothing management.   Bathing from  shower chair/bench with Modified [place].  Toilet transfer to raised toilet with Modified [place].  Increased strength, coordination, and functional activity tolerance for ADL's/IADL's..                         Time Tracking:     OT Date of Treatment: 12/14/23  OT Start Time: 0920  OT Stop Time: 0931  OT Total Time (min): 11 min    Billable Minutes:Self Care/Home Management 11    OT/JOS: OT          12/14/2023

## 2023-12-14 NOTE — SUBJECTIVE & OBJECTIVE
Interval History: See HPI    Past Medical History:   Diagnosis Date    Diabetes mellitus, type 2     Diabetic foot ulcer associated with diabetes mellitus due to underlying condition 07/16/2020    ESRD on hemodialysis     Hyperlipidemia     Hypertension     Obese        Past Surgical History:   Procedure Laterality Date    AV FISTULA PLACEMENT Left 07/06/2023    Procedure: CREATION, AV FISTULA;  Surgeon: Daniel Poon MD;  Location: [facility];  Service: Vascular;  Laterality: Left;  RN PREOP 6/28/2023  LABS DAY OF SURGERY    BONE BIOPSY Left 07/17/2020    Procedure: BIOPSY, BONE;  Surgeon: Verona Whitmore DPM;  Location: [facility] OR;  Service: Podiatry;  Laterality: Left;    CERVICAL DISC SURGERY  07/11/2016    DEBRIDEMENT Left 07/17/2020    Procedure: DEBRIDEMENT;  Surgeon: Verona Whitmore DPM;  Location: [facility] OR;  Service: Podiatry;  Laterality: Left;    DEBRIDEMENT OF FOOT Right     toes & plantar surface    ESOPHAGEAL MANOMETRY WITH MEASUREMENT OF IMPEDANCE N/A 03/27/2023    Procedure: MANOMETRY, ESOPHAGUS, WITH IMPEDANCE MEASUREMENT;  Surgeon: Roopa Pérez MD;  [facility]  Service: Endoscopy;  Laterality: N/A;  Belching and rumination protocol please  instructions via portal - sm    ESOPHAGOGASTRODUODENOSCOPY N/A 12/16/2022    Procedure: EGD (ESOPHAGOGASTRODUODENOSCOPY);  Surgeon: Eren Francois MD;  Location: [facility];  Service: Endoscopy;  Laterality: N/A;  Pt. on Dialysis. K+ ordered prior to procedure.EC    ESOPHAGOGASTRODUODENOSCOPY N/A 12/5/2023    Procedure: EGD (ESOPHAGOGASTRODUODENOSCOPY);  Surgeon: Kash Johnston MD;  Location: [facility];  Service: Endoscopy;  Laterality: N/A;    FRACTURE SURGERY Right     medial ankle, metal plate present    INSERTION OF TUNNELED CENTRAL VENOUS CATHETER (CVC) WITH SUBCUTANEOUS PORT N/A 06/11/2021    Procedure: TUNNEL CATH INSERTION WITHOUT PORT;  Surgeon: Jose Manuel Diagnostic Provider;  Location: [facility];  Service: Radiology;   Laterality: N/A;  11AM START---PHONE PREOP 6/10/21---COVID POSTIVE ON 7/2020---NO S/S    left leg orthopedic surgery Left     UPPER GASTROINTESTINAL ENDOSCOPY         Review of patient's allergies indicates:  No Known Allergies    Medications:  Continuous Infusions:  Scheduled Meds:   amitriptyline  10 mg Oral QHS    heparin (porcine)  5,000 Units Subcutaneous Q8H    hydrALAZINE  100 mg Oral TID    mupirocin   Nasal BID    NIFEdipine  90 mg Oral Daily    pantoprazole  40 mg Oral Daily    sevelamer carbonate  800 mg Oral TID WM    sodium chloride 0.9%  10 mL Intravenous Q8H     PRN Meds:sodium chloride 0.9%, acetaminophen, albuterol-ipratropium, aluminum-magnesium hydroxide-simethicone, chlorproMAZINE, glucagon (human recombinant), glucose, glucose, heparin (porcine), insulin aspart U-100, melatonin, naloxone, ondansetron, prochlorperazine, senna-docusate 8.6-50 mg, sodium chloride 0.9%    Family History       Problem Relation (Age of Onset)    Heart disease Father          Tobacco Use    Smoking status: Never    Smokeless tobacco: Never   Substance and Sexual Activity    Alcohol use: Not Currently     Comment: occ rare beer    Drug use: No    Sexual activity: Not Currently       Review of Systems  Objective:     Vital Signs (Most Recent):  Temp: 96.3 °F (35.7 °C) (12/14/23 0715)  Pulse: 80 (12/14/23 0715)  Resp: 18 (12/14/23 0313)  BP: (!) 165/78 (12/14/23 0715)  SpO2: 96 % (12/14/23 0715) Vital Signs (24h Range):  Temp:  [96.3 °F (35.7 °C)-98.5 °F (36.9 °C)] 96.3 °F (35.7 °C)  Pulse:  [76-95] 80  Resp:  [17-18] 18  SpO2:  [95 %-99 %] 96 %  BP: (158-180)/(74-87) 165/78     Weight: 90.8 kg (200 lb 2.8 oz)  Body mass index is 26.41 kg/m².       Physical Exam  Vitals reviewed.   Constitutional:       General: He is not in acute distress.     Appearance: He is not ill-appearing or toxic-appearing.   HENT:      Head: Normocephalic and atraumatic.      Right Ear: External ear normal.      Left Ear: External ear normal.       Mouth/Throat:      Mouth: Mucous membranes are moist.      Pharynx: No oropharyngeal exudate.   Eyes:      General:         Right eye: No discharge.         Left eye: No discharge.      Extraocular Movements: Extraocular movements intact.   Cardiovascular:      Rate and Rhythm: Normal rate.   Pulmonary:      Effort: Pulmonary effort is normal. No respiratory distress.   Abdominal:      General: There is no distension.      Palpations: Abdomen is soft.      Tenderness: There is no abdominal tenderness.   Skin:     General: Skin is warm.   Neurological:      General: No focal deficit present.      Mental Status: He is alert and oriented to person, place, and time.   Psychiatric:         Mood and Affect: Mood normal.         Behavior: Behavior normal.         Thought Content: Thought content normal.         Judgment: Judgment normal.          Review of Symptoms      Symptom Assessment (ESAS 0-10 Scale)  Pain:  0  Dyspnea:  0  Anxiety:  2  Nausea:  0  Depression:  0  Anorexia:  7  Fatigue:  6  Insomnia:  0  Restlessness:  0  Agitation:  0         Performance Status:  50    Living Arrangements:  Lives alone and Lives in home    Psychosocial/Cultural:   See Palliative Psychosocial Note: No  **Primary  to Follow**  Palliative Care  Consult: No      Advance Care Planning   Advance Directives:     Decision Making:  Patient answered questions  Goals of Care: The patient endorses that what is most important right now is to focus on spending time at home, avoiding the hospital, and remaining as independent as possible    Accordingly, we have decided that the best plan to meet the patient's goals includes continuing with treatment         Significant Labs: BMP:   Recent Labs   Lab 12/14/23 0413   GLU 82   *   K 5.2*   CL 96   CO2 23   BUN 42*   CREATININE 9.0*   CALCIUM 9.0   MG 2.2     CBC:   Recent Labs   Lab 12/13/23 0412 12/14/23 0413   WBC 4.45 3.78*   HGB 11.4* 11.3*   HCT 35.3* 34.6*     158     CBC:   Recent Labs   Lab 12/14/23 0413   WBC 3.78*   HGB 11.3*   HCT 34.6*   MCV 89        BMP:  Recent Labs   Lab 12/14/23 0413   GLU 82   *   K 5.2*   CL 96   CO2 23   BUN 42*   CREATININE 9.0*   CALCIUM 9.0   MG 2.2     LFT:  Lab Results   Component Value Date    AST 53 (H) 12/14/2023    ALKPHOS 688 (H) 12/14/2023    BILITOT 1.4 (H) 12/14/2023     Albumin:   Albumin   Date Value Ref Range Status   12/14/2023 2.5 (L) 3.5 - 5.2 g/dL Final     Protein:   Total Protein   Date Value Ref Range Status   12/14/2023 7.0 6.0 - 8.4 g/dL Final     Lactic acid:   Lab Results   Component Value Date    LACTATE 0.8 07/16/2020       Significant Imaging: I have reviewed all pertinent imaging results/findings within the past 24 hours.

## 2023-12-14 NOTE — PLAN OF CARE
Pt was been accepted @ [facility]. Auth will be requested once pt has a diagnosis. CM following         12/14/23 1508   Post-Acute Status   Post-Acute Authorization Placement   Post-Acute Placement Status Referrals Sent

## 2023-12-14 NOTE — HPI
"Per admit H&P: "Catalino Mcfadden is a 57 y/o M with past medical history significant for HTN, ESRD on HD, DM2, gastroparesis, diabetic foot ulcer, chronic hiccups, and HLD who presented to the ED for with episodic dizziness, vomiting, and lower extremity weakness following a fall x 1 day. He reports nausea has been resolved with Zofran. He denies chest pain, shortness of breath, fever, chills, abdominal pain, headaches, light-headedness, numbness, tingling, or LOC. He reports he missed his Saturday hemodialysis treatment but states last week he received HD Tues, Wed, Thurs, during hospitaliztion. He was recently discharged 12/07. Lab work reflective of ESRD with elevated bili, ALP, and AST. In the ED blood pressure 200s/100s, received IV and PO antihypertensive medication with mild improvemnt. He states he did not take his medication yesterday due to N/V. During examination patient states he is unable to lift his lower extremities, however, was able to walk into the ED with assistance from a friend. CT Head negative. 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 Biopsy pending. Onc has evaluated; work up pending. NSGY has evaluated without plans for intervention at the moment. At this point he carries a guarded prognosis. I have been asked to assist with goals of care and code status discussion.  "

## 2023-12-14 NOTE — TELEPHONE ENCOUNTER
Patient calling to cancel colonoscopy scheduled for tomorrow 12/15/23 due to being admitted to the hospital. Patient will call back to reschedule once discharged.

## 2023-12-15 PROBLEM — M19.90 DJD (DEGENERATIVE JOINT DISEASE): Status: ACTIVE | Noted: 2023-12-15

## 2023-12-15 PROBLEM — S34.111A: Status: ACTIVE | Noted: 2023-12-15

## 2023-12-15 LAB
ALBUMIN SERPL BCP-MCNC: 2.6 G/DL (ref 3.5–5.2)
ALBUMIN SERPL ELPH-MCNC: 3.3 G/DL (ref 3.4–4.7)
ALBUMIN/GLOB SERPL ELPH: 0.75 {RATIO}
ALP SERPL-CCNC: 730 U/L (ref 55–135)
ALPHA1 GLOB SERPL ELPH-MCNC: 0.3 G/DL (ref 0.1–0.3)
ALPHA2 GLOB SERPL ELPH-MCNC: 0.9 G/DL (ref 0.6–1)
ALT SERPL W/O P-5'-P-CCNC: 44 U/L (ref 10–44)
ANION GAP SERPL CALC-SCNC: 13 MMOL/L (ref 8–16)
AST SERPL-CCNC: 76 U/L (ref 10–40)
B-GLOBULIN SERPL ELPH-MCNC: 1.2 G/DL (ref 0.7–1.2)
B2 MICROGLOB SERPL-MCNC: 51.3 MCG/ML (ref 1.21–2.7)
BASOPHILS # BLD AUTO: 0.02 K/UL (ref 0–0.2)
BASOPHILS NFR BLD: 0.5 % (ref 0–1.9)
BILIRUB SERPL-MCNC: 1.4 MG/DL (ref 0.1–1)
BUN SERPL-MCNC: 37 MG/DL (ref 6–20)
CALCIUM SERPL-MCNC: 9.2 MG/DL (ref 8.7–10.5)
CHLORIDE SERPL-SCNC: 98 MMOL/L (ref 95–110)
CO2 SERPL-SCNC: 21 MMOL/L (ref 23–29)
CREAT SERPL-MCNC: 8 MG/DL (ref 0.5–1.4)
DIFFERENTIAL METHOD BLD: ABNORMAL
EOSINOPHIL # BLD AUTO: 0.1 K/UL (ref 0–0.5)
EOSINOPHIL NFR BLD: 2.3 % (ref 0–8)
ERYTHROCYTE [DISTWIDTH] IN BLOOD BY AUTOMATED COUNT: 16.2 % (ref 11.5–14.5)
EST. GFR  (NO RACE VARIABLE): 7 ML/MIN/1.73 M^2
GAMMA GLOB SERPL ELPH-MCNC: 2 G/DL (ref 0.6–1.6)
GLUCOSE SERPL-MCNC: 88 MG/DL (ref 70–110)
HCT VFR BLD AUTO: 36.2 % (ref 40–54)
HGB BLD-MCNC: 11.6 G/DL (ref 14–18)
IMM GRANULOCYTES # BLD AUTO: 0.02 K/UL (ref 0–0.04)
IMM GRANULOCYTES NFR BLD AUTO: 0.5 % (ref 0–0.5)
LYMPHOCYTES # BLD AUTO: 0.8 K/UL (ref 1–4.8)
LYMPHOCYTES NFR BLD: 17.4 % (ref 18–48)
M PROTEIN 1 SERPL ELPH-MCNC: ABNORMAL G/L
M PROTEIN 2 SERPL ELPH-MCNC: ABNORMAL G/DL
MAGNESIUM SERPL-MCNC: 2.2 MG/DL (ref 1.6–2.6)
MCH RBC QN AUTO: 28.9 PG (ref 27–31)
MCHC RBC AUTO-ENTMCNC: 32 G/DL (ref 32–36)
MCV RBC AUTO: 90 FL (ref 82–98)
MONOCYTES # BLD AUTO: 0.6 K/UL (ref 0.3–1)
MONOCYTES NFR BLD: 14.6 % (ref 4–15)
NEUTROPHILS # BLD AUTO: 2.8 K/UL (ref 1.8–7.7)
NEUTROPHILS NFR BLD: 64.7 % (ref 38–73)
NRBC BLD-RTO: 0 /100 WBC
PLATELET # BLD AUTO: 131 K/UL (ref 150–450)
PMV BLD AUTO: 9.4 FL (ref 9.2–12.9)
POCT GLUCOSE: 103 MG/DL (ref 70–110)
POCT GLUCOSE: 135 MG/DL (ref 70–110)
POCT GLUCOSE: 88 MG/DL (ref 70–110)
POCT GLUCOSE: 93 MG/DL (ref 70–110)
POTASSIUM SERPL-SCNC: 4.9 MMOL/L (ref 3.5–5.1)
PROT PATTERN SERPL ELPH-IMP: ABNORMAL
PROT SERPL-MCNC: 7.4 G/DL (ref 6–8.4)
PROT SERPL-MCNC: 7.7 G/DL (ref 6.3–7.9)
RBC # BLD AUTO: 4.02 M/UL (ref 4.6–6.2)
SODIUM SERPL-SCNC: 132 MMOL/L (ref 136–145)
WBC # BLD AUTO: 4.32 K/UL (ref 3.9–12.7)

## 2023-12-15 PROCEDURE — 11000001 HC ACUTE MED/SURG PRIVATE ROOM

## 2023-12-15 PROCEDURE — 63600175 PHARM REV CODE 636 W HCPCS

## 2023-12-15 PROCEDURE — 97110 THERAPEUTIC EXERCISES: CPT

## 2023-12-15 PROCEDURE — 99232 SBSQ HOSP IP/OBS MODERATE 35: CPT | Mod: ,,, | Performed by: NURSE PRACTITIONER

## 2023-12-15 PROCEDURE — 83735 ASSAY OF MAGNESIUM: CPT

## 2023-12-15 PROCEDURE — 25000003 PHARM REV CODE 250: Performed by: INTERNAL MEDICINE

## 2023-12-15 PROCEDURE — 99222 1ST HOSP IP/OBS MODERATE 55: CPT | Mod: ,,, | Performed by: NEUROLOGICAL SURGERY

## 2023-12-15 PROCEDURE — 94761 N-INVAS EAR/PLS OXIMETRY MLT: CPT

## 2023-12-15 PROCEDURE — 97116 GAIT TRAINING THERAPY: CPT

## 2023-12-15 PROCEDURE — 99900035 HC TECH TIME PER 15 MIN (STAT)

## 2023-12-15 PROCEDURE — 36415 COLL VENOUS BLD VENIPUNCTURE: CPT

## 2023-12-15 PROCEDURE — 85025 COMPLETE CBC W/AUTO DIFF WBC: CPT

## 2023-12-15 PROCEDURE — 25000003 PHARM REV CODE 250

## 2023-12-15 PROCEDURE — 80053 COMPREHEN METABOLIC PANEL: CPT

## 2023-12-15 RX ADMIN — MUPIROCIN: 20 OINTMENT TOPICAL at 08:12

## 2023-12-15 RX ADMIN — SEVELAMER CARBONATE 800 MG: 800 TABLET, FILM COATED ORAL at 05:12

## 2023-12-15 RX ADMIN — MUPIROCIN: 20 OINTMENT TOPICAL at 09:12

## 2023-12-15 RX ADMIN — MELATONIN TAB 3 MG 9 MG: 3 TAB at 08:12

## 2023-12-15 RX ADMIN — PANTOPRAZOLE SODIUM 40 MG: 40 TABLET, DELAYED RELEASE ORAL at 08:12

## 2023-12-15 RX ADMIN — HEPARIN SODIUM 5000 UNITS: 5000 INJECTION, SOLUTION INTRAVENOUS; SUBCUTANEOUS at 08:12

## 2023-12-15 RX ADMIN — AMITRIPTYLINE HYDROCHLORIDE 10 MG: 10 TABLET, FILM COATED ORAL at 08:12

## 2023-12-15 RX ADMIN — HEPARIN SODIUM 5000 UNITS: 5000 INJECTION, SOLUTION INTRAVENOUS; SUBCUTANEOUS at 12:12

## 2023-12-15 RX ADMIN — HEPARIN SODIUM 5000 UNITS: 5000 INJECTION, SOLUTION INTRAVENOUS; SUBCUTANEOUS at 02:12

## 2023-12-15 RX ADMIN — SEVELAMER CARBONATE 800 MG: 800 TABLET, FILM COATED ORAL at 08:12

## 2023-12-15 RX ADMIN — NIFEDIPINE 90 MG: 30 TABLET, FILM COATED, EXTENDED RELEASE ORAL at 08:12

## 2023-12-15 RX ADMIN — SEVELAMER CARBONATE 800 MG: 800 TABLET, FILM COATED ORAL at 12:12

## 2023-12-15 RX ADMIN — HYDRALAZINE HYDROCHLORIDE 100 MG: 25 TABLET, FILM COATED ORAL at 08:12

## 2023-12-15 RX ADMIN — ALUMINUM HYDROXIDE, MAGNESIUM HYDROXIDE, AND SIMETHICONE 30 ML: 200; 200; 20 SUSPENSION ORAL at 08:12

## 2023-12-15 RX ADMIN — HYDRALAZINE HYDROCHLORIDE 100 MG: 25 TABLET, FILM COATED ORAL at 02:12

## 2023-12-15 NOTE — SUBJECTIVE & OBJECTIVE
INTERVAL HISTORY: weakness persists, PT/OT following - recommends moderate intensity therapy - spinal lesions concerning for metastatic disease - anticipate snf discharge when ready      Past Medical History:   Diagnosis Date    Diabetes mellitus, type 2     Diabetic foot ulcer associated with diabetes mellitus due to underlying condition 07/16/2020    ESRD on hemodialysis     Hyperlipidemia     Hypertension     Obese        Past Surgical History:   Procedure Laterality Date    AV FISTULA PLACEMENT Left 07/06/2023    Procedure: CREATION, AV FISTULA;  Surgeon: Daniel Poon MD;  Location: [facility] OR;  Service: Vascular;  Laterality: Left;  RN PREOP 6/28/2023  LABS DAY OF SURGERY    BONE BIOPSY Left 07/17/2020    Procedure: BIOPSY, BONE;  Surgeon: Verona Whitmore DPM;  Location: [facility] OR;  Service: Podiatry;  Laterality: Left;    CERVICAL DISC SURGERY  07/11/2016    DEBRIDEMENT Left 07/17/2020    Procedure: DEBRIDEMENT;  Surgeon: Verona Whitmore DPM;  Location: [facility] OR;  Service: Podiatry;  Laterality: Left;    DEBRIDEMENT OF FOOT Right     toes & plantar surface    ESOPHAGEAL MANOMETRY WITH MEASUREMENT OF IMPEDANCE N/A 03/27/2023    Procedure: MANOMETRY, ESOPHAGUS, WITH IMPEDANCE MEASUREMENT;  Surgeon: Roopa Pérez MD;  Location: [facility] (4TH FLR);  Service: Endoscopy;  Laterality: N/A;  Belching and rumination protocol please  instructions via portal - sm    ESOPHAGOGASTRODUODENOSCOPY N/A 12/16/2022    Procedure: EGD (ESOPHAGOGASTRODUODENOSCOPY);  Surgeon: Eren Francois MD;  Location: [facility];  Service: Endoscopy;  Laterality: N/A;  Pt. on Dialysis. K+ ordered prior to procedure.EC    ESOPHAGOGASTRODUODENOSCOPY N/A 12/5/2023    Procedure: EGD (ESOPHAGOGASTRODUODENOSCOPY);  Surgeon: Kash Johnston MD;  Location: [facility];  Service: Endoscopy;  Laterality: N/A;    FRACTURE SURGERY Right     medial ankle, metal plate present    INSERTION OF TUNNELED CENTRAL VENOUS CATHETER (CVC) WITH  SUBCUTANEOUS PORT N/A 06/11/2021    Procedure: TUNNEL CATH INSERTION WITHOUT PORT;  Surgeon: Jose Manuel Diagnostic Provider;  Location: [facility] OR;  Service: Radiology;  Laterality: N/A;  11AM START---PHONE PREOP 6/10/21---COVID POSTIVE ON 7/2020---NO S/S    left leg orthopedic surgery Left     UPPER GASTROINTESTINAL ENDOSCOPY         Review of patient's allergies indicates:  No Known Allergies    No current facility-administered medications on file prior to encounter.     Current Outpatient Medications on File Prior to Encounter   Medication Sig    hydrALAZINE (APRESOLINE) 100 MG tablet Take 1 tablet (100 mg total) by mouth 3 (three) times daily.    NIFEdipine (PROCARDIA XL) 90 MG (OSM) 24 hr tablet Take 1 tablet (90 mg total) by mouth once daily.    promethazine (PHENERGAN) 12.5 MG Tab Take 1 tablet (12.5 mg total) by mouth every 6 (six) hours as needed (nausea and vomiting).    amitriptyline (ELAVIL) 10 MG tablet Take 1 tablet (10 mg total) by mouth every evening. (Patient not taking: Reported on 12/12/2023)    GAVILYTE-C 240-22.72-6.72 -5.84 gram SolR Take 4,000 mLs by mouth once.    heparin sod,pork in 0.45% NaCl (HEPARIN, PORCINE, 100 UNITS) 100 unit/100 mL (1 unit/mL) SolP in sodium chloride 0.45 % 100 mL infusion Inject 1 mL/hr into the vein continuous.    methoxy peg-epoetin beta (MIRCERA INJ) Inject 50 mcg into the skin every 28 days.    sevelamer carbonate (RENVELA) 800 mg Tab Take 1 tablet (800 mg total) by mouth 3 (three) times daily with meals. (Patient not taking: Reported on 12/12/2023)    sucroferric oxyhydroxide (VELPHORO) 500 mg Chew Take 1 tablet by mouth once daily.    torsemide (DEMADEX) 20 MG Tab Take 1 tablet by mouth once daily.     Family History       Problem Relation (Age of Onset)    Heart disease Father          Tobacco Use    Smoking status: Never    Smokeless tobacco: Never   Substance and Sexual Activity    Alcohol use: Not Currently     Comment: occ rare beer    Drug use: No    Sexual  activity: Not Currently     Review of Systems   Constitutional:  Positive for activity change and fatigue. Negative for appetite change, chills, diaphoresis and fever.   HENT: Negative.     Eyes: Negative.    Respiratory:  Negative for cough, shortness of breath and wheezing.    Cardiovascular:  Negative for chest pain and leg swelling.   Gastrointestinal:  Negative for abdominal distention, abdominal pain, constipation, diarrhea, nausea and vomiting.   Endocrine: Negative.    Genitourinary:  Negative for difficulty urinating and dysuria.   Musculoskeletal:  Negative for arthralgias, back pain and myalgias.   Neurological:  Positive for weakness (bilateral lower extremity). Negative for syncope, facial asymmetry, speech difficulty, light-headedness, numbness and headaches.   Hematological: Negative.    Psychiatric/Behavioral: Negative.       Objective:     Vital Signs (Most Recent):  Temp: 97.6 °F (36.4 °C) (12/15/23 0709)  Pulse: 84 (12/15/23 0709)  Resp: 17 (12/15/23 0709)  BP: (!) 160/82 (12/15/23 0709)  SpO2: (!) 94 % (12/15/23 0709) Vital Signs (24h Range):  Temp:  [97 °F (36.1 °C)-98.1 °F (36.7 °C)] 97.6 °F (36.4 °C)  Pulse:  [56-93] 84  Resp:  [17-20] 17  SpO2:  [94 %-99 %] 94 %  BP: (104-181)/(72-98) 160/82     Weight: 90.8 kg (200 lb 2.8 oz)  Body mass index is 26.41 kg/m².     Physical Exam  Vitals and nursing note reviewed.   Constitutional:       General: He is not in acute distress.     Appearance: Normal appearance. He is not toxic-appearing.   Cardiovascular:      Rate and Rhythm: Normal rate and regular rhythm.      Heart sounds: No murmur heard.     No gallop.   Pulmonary:      Effort: Pulmonary effort is normal. No respiratory distress.      Breath sounds: Normal breath sounds. No wheezing.   Abdominal:      General: Bowel sounds are normal. There is no distension.      Palpations: Abdomen is soft.      Tenderness: There is no abdominal tenderness.   Skin:     General: Skin is warm and dry.       Capillary Refill: Capillary refill takes less than 2 seconds.      Comments: Left upper arm AV Fistula noted with positive bruit and thrill   Neurological:      Mental Status: He is alert and oriented to person, place, and time. Mental status is at baseline.      GCS: GCS eye subscore is 4. GCS verbal subscore is 5. GCS motor subscore is 6.      Sensory: Sensation is intact.      Motor: Weakness (bilateral lower extremity) present.      Comments: Reports unable to lift legs against gravity due to weakness   Psychiatric:         Mood and Affect: Mood normal.                Significant Labs: All pertinent labs within the past 24 hours have been reviewed.  BMP:   Recent Labs   Lab 12/15/23  0401   GLU 88   *   K 4.9   CL 98   CO2 21*   BUN 37*   CREATININE 8.0*   CALCIUM 9.2   MG 2.2       CBC:   Recent Labs   Lab 12/14/23  0413 12/15/23  0401   WBC 3.78* 4.32   HGB 11.3* 11.6*   HCT 34.6* 36.2*    131*       CMP:   Recent Labs   Lab 12/14/23  0413 12/15/23  0401   * 132*   K 5.2* 4.9   CL 96 98   CO2 23 21*   GLU 82 88   BUN 42* 37*   CREATININE 9.0* 8.0*   CALCIUM 9.0 9.2   PROT 7.0 7.4   ALBUMIN 2.5* 2.6*   BILITOT 1.4* 1.4*   ALKPHOS 688* 730*   AST 53* 76*   ALT 31 44   ANIONGAP 13 13         Significant Imaging:     Imaging Results              CT Head Without Contrast (Final result)  Result time 12/12/23 07:46:24      Final result by Jj Serna MD (12/12/23 07:46:24)                   Impression:      No evidence of an acute intracranial abnormality.    Final read.      Electronically signed by: Jj Serna  Date:    12/12/2023  Time:    07:46               Narrative:    EXAMINATION:  CT HEAD WITHOUT CONTRAST    CLINICAL HISTORY:  Dizziness, persistent/recurrent, cardiac or vascular cause suspected;    TECHNIQUE:  Low dose axial images were obtained through the head.  Coronal and sagittal reformations were also performed. Contrast was not  administered.    COMPARISON:  02/22/2023    FINDINGS:  Ventricles and sulci are normal in size for age without evidence of hydrocephalus.    Mild scattered hypoattenuation within the supratentorial white matter, nonspecific but probably reflecting sequelae of chronic microvascular ischemic change.   No evidence of acute parenchymal hemorrhage, edema, significant mass effect or major vascular distribution infarct.    No extra-axial blood or fluid collections.    No displaced calvarial fracture.    The mastoid air cells and visualized paranasal sinuses are essentially clear.    Calcific atherosclerosis of the internal carotid and vertebral arteries at the skull base.                                       X-Ray Chest AP Portable (Final result)  Result time 12/12/23 08:31:55      Final result by Angy Beckford MD (12/12/23 08:31:55)                   Impression:      Improvement in the appearance of the chest with decrease of lung infiltrates suggesting improving pulmonary edema      Electronically signed by: Angy Beckford MD  Date:    12/12/2023  Time:    08:31               Narrative:    EXAMINATION:  XR CHEST AP PORTABLE    CLINICAL HISTORY:  Dizziness and giddiness    TECHNIQUE:  Single frontal view of the chest was performed.    COMPARISON:  12/04/2023    FINDINGS:  Stable cardiomediastinal silhouette.  Appear mildly enlarged as before.  Right-sided internal jugular venous catheter remains in place with the distal tip at the level of the caval atrial junction.  Prominent lung markings suggesting mild pulmonary vascular distention with this is decreased compared to the prior exam.  No pleural effusion

## 2023-12-15 NOTE — PLAN OF CARE
Problem: Occupational Therapy  Goal: Occupational Therapy Goal  Description: Goals to be met by: 1/9/24     Patient will increase functional independence with ADLs by performing:    Feeding with Modified [place].  UE Dressing with Modified [place].  LE Dressing with Modified [place].  Grooming while standing at sink with Modified [place].  Toileting from raised toilet with Modified [place] for hygiene and clothing management.   Bathing from  shower chair/bench with Modified [place].  Toilet transfer to raised toilet with Modified [place].  Increased strength, coordination, and functional activity tolerance for ADL's/IADL's..    Outcome: Ongoing, Progressing

## 2023-12-15 NOTE — PROGRESS NOTES
"[facility] Medicine  Progress Note    Patient Name: Catalino Mcfadden  MRN: [id number]  Patient Class: IP- Inpatient   Admission Date: 12/11/2023  Length of Stay: 2 days  Attending Physician: Judy Lane MD  Primary Care Provider: Marty Rodríguez PA-C        Subjective:     Principal Problem:Abnormal MRI, lumbar spine        HPI:  Catalino Mcfadden is a 59 y/o M with past medical history significant for HTN, ESRD on HD, DM2, gastroparesis, diabetic foot ulcer, chronic hiccups, and HLD who presented to the ED for with episodic dizziness, vomiting, and lower extremity weakness following a fall x 1 day. He reports nausea has been resolved with Zofran. He denies chest pain, shortness of breath, fever, chills, abdominal pain, headaches, light-headedness, numbness, tingling, or LOC. He reports he missed his Saturday hemodialysis treatment but states last week he received HD Tues, Wed, Thurs, during hospitaliztion. He was recently discharged 12/07. Lab work reflective of ESRD with elevated bili, ALP, and AST. In the ED blood pressure 200s/100s, received IV and PO antihypertensive medication with mild improvemnt. He states he did not take his medication yesterday due to N/V. During examination patient states he is unable to lift his lower extremities, however, was able to walk into the ED with assistance from a friend. CT Head negative. Admitted to hospital medicine for hypertensive emergency evaluation and treatment with nephrology and neurology consults placed.                     Overview/Hospital Course:  Patient presented with hypertensive emergency not taking medication for blood pressure.  He reports nausea which has resolved in early morning he was able to tolerate breakfast.  Patient had also reported a fall at home a few days prior to presentation.  MRI lumbar spine obtained that showed "1. Multiple scattered osseous lesions throughout the visualized lumbosacral spine and iliac bones, " "nonspecific, but given findings on comparison chest CT from 03/22/2023 are concerning for metastatic disease.  Further evaluation recommended.   2. Mild multilevel degenerative change of the lumbar spine, as detailed above, most pronounced at L3-4 through L5-S1 and resulting in mild neural foraminal stenosis.  No significant spinal canal stenosis at any lumbar level"     Neurology following - workup ongoing. Nephrology following - HD. Oncology - workup ongoing (abnormal lesion vs multiple myelomal - Notes if myeloma workup is positive he will need bone marrow biopsy - awaiting lab results - Palliative care consult code status remains full code - anticipate SNF discharge.    INTERVAL HISTORY: weakness persists, PT/OT following - recommends moderate intensity therapy - spinal lesions concerning for metastatic disease - anticipate snf discharge when ready      Past Medical History:   Diagnosis Date    Diabetes mellitus, type 2     Diabetic foot ulcer associated with diabetes mellitus due to underlying condition 07/16/2020    ESRD on hemodialysis     Hyperlipidemia     Hypertension     Obese        Past Surgical History:   Procedure Laterality Date    AV FISTULA PLACEMENT Left 07/06/2023    Procedure: CREATION, AV FISTULA;  Surgeon: Daniel Poon MD;  Location: [facility] OR;  Service: Vascular;  Laterality: Left;  RN PREOP 6/28/2023  LABS DAY OF SURGERY    BONE BIOPSY Left 07/17/2020    Procedure: BIOPSY, BONE;  Surgeon: Verona Whitmore DPM;  Location: [facility] OR;  Service: Podiatry;  Laterality: Left;    CERVICAL DISC SURGERY  07/11/2016    DEBRIDEMENT Left 07/17/2020    Procedure: DEBRIDEMENT;  Surgeon: Verona Whitmore DPM;  Location: [facility] OR;  Service: Podiatry;  Laterality: Left;    DEBRIDEMENT OF FOOT Right     toes & plantar surface    ESOPHAGEAL MANOMETRY WITH MEASUREMENT OF IMPEDANCE N/A 03/27/2023    Procedure: MANOMETRY, ESOPHAGUS, WITH IMPEDANCE MEASUREMENT;  Surgeon: Roopa Pérez MD;  Location: [facility]" ENDO (4TH FLR);  Service: Endoscopy;  Laterality: N/A;  Belching and rumination protocol please  instructions via portal - sm    ESOPHAGOGASTRODUODENOSCOPY N/A 12/16/2022    Procedure: EGD (ESOPHAGOGASTRODUODENOSCOPY);  Surgeon: Eren Francois MD;  Location: [facility] ENDO;  Service: Endoscopy;  Laterality: N/A;  Pt. on Dialysis. K+ ordered prior to procedure.EC    ESOPHAGOGASTRODUODENOSCOPY N/A 12/5/2023    Procedure: EGD (ESOPHAGOGASTRODUODENOSCOPY);  Surgeon: Kash Johnston MD;  Location: [facility] ENDO;  Service: Endoscopy;  Laterality: N/A;    FRACTURE SURGERY Right     medial ankle, metal plate present    INSERTION OF TUNNELED CENTRAL VENOUS CATHETER (CVC) WITH SUBCUTANEOUS PORT N/A 06/11/2021    Procedure: TUNNEL CATH INSERTION WITHOUT PORT;  Surgeon: Jose Manuel Diagnostic Provider;  Location: [facility] OR;  Service: Radiology;  Laterality: N/A;  11AM START---PHONE PREOP 6/10/21---COVID POSTIVE ON 7/2020---NO S/S    left leg orthopedic surgery Left     UPPER GASTROINTESTINAL ENDOSCOPY         Review of patient's allergies indicates:  No Known Allergies    No current facility-administered medications on file prior to encounter.     Current Outpatient Medications on File Prior to Encounter   Medication Sig    hydrALAZINE (APRESOLINE) 100 MG tablet Take 1 tablet (100 mg total) by mouth 3 (three) times daily.    NIFEdipine (PROCARDIA XL) 90 MG (OSM) 24 hr tablet Take 1 tablet (90 mg total) by mouth once daily.    promethazine (PHENERGAN) 12.5 MG Tab Take 1 tablet (12.5 mg total) by mouth every 6 (six) hours as needed (nausea and vomiting).    amitriptyline (ELAVIL) 10 MG tablet Take 1 tablet (10 mg total) by mouth every evening. (Patient not taking: Reported on 12/12/2023)    GAVILYTE-C 240-22.72-6.72 -5.84 gram SolR Take 4,000 mLs by mouth once.    heparin sod,pork in 0.45% NaCl (HEPARIN, PORCINE, 100 UNITS) 100 unit/100 mL (1 unit/mL) SolP in sodium chloride 0.45 % 100 mL infusion Inject 1 mL/hr into the vein continuous.     methoxy peg-epoetin beta (MIRCERA INJ) Inject 50 mcg into the skin every 28 days.    sevelamer carbonate (RENVELA) 800 mg Tab Take 1 tablet (800 mg total) by mouth 3 (three) times daily with meals. (Patient not taking: Reported on 12/12/2023)    sucroferric oxyhydroxide (VELPHORO) 500 mg Chew Take 1 tablet by mouth once daily.    torsemide (DEMADEX) 20 MG Tab Take 1 tablet by mouth once daily.     Family History       Problem Relation (Age of Onset)    Heart disease Father          Tobacco Use    Smoking status: Never    Smokeless tobacco: Never   Substance and Sexual Activity    Alcohol use: Not Currently     Comment: occ rare beer    Drug use: No    Sexual activity: Not Currently     Review of Systems   Constitutional:  Positive for activity change and fatigue. Negative for appetite change, chills, diaphoresis and fever.   HENT: Negative.     Eyes: Negative.    Respiratory:  Negative for cough, shortness of breath and wheezing.    Cardiovascular:  Negative for chest pain and leg swelling.   Gastrointestinal:  Negative for abdominal distention, abdominal pain, constipation, diarrhea, nausea and vomiting.   Endocrine: Negative.    Genitourinary:  Negative for difficulty urinating and dysuria.   Musculoskeletal:  Negative for arthralgias, back pain and myalgias.   Neurological:  Positive for weakness (bilateral lower extremity). Negative for syncope, facial asymmetry, speech difficulty, light-headedness, numbness and headaches.   Hematological: Negative.    Psychiatric/Behavioral: Negative.       Objective:     Vital Signs (Most Recent):  Temp: 97.6 °F (36.4 °C) (12/15/23 0709)  Pulse: 84 (12/15/23 0709)  Resp: 17 (12/15/23 0709)  BP: (!) 160/82 (12/15/23 0709)  SpO2: (!) 94 % (12/15/23 0709) Vital Signs (24h Range):  Temp:  [97 °F (36.1 °C)-98.1 °F (36.7 °C)] 97.6 °F (36.4 °C)  Pulse:  [56-93] 84  Resp:  [17-20] 17  SpO2:  [94 %-99 %] 94 %  BP: (104-181)/(72-98) 160/82     Weight: 90.8 kg (200 lb 2.8 oz)  Body  mass index is 26.41 kg/m².     Physical Exam  Vitals and nursing note reviewed.   Constitutional:       General: He is not in acute distress.     Appearance: Normal appearance. He is not toxic-appearing.   Cardiovascular:      Rate and Rhythm: Normal rate and regular rhythm.      Heart sounds: No murmur heard.     No gallop.   Pulmonary:      Effort: Pulmonary effort is normal. No respiratory distress.      Breath sounds: Normal breath sounds. No wheezing.   Abdominal:      General: Bowel sounds are normal. There is no distension.      Palpations: Abdomen is soft.      Tenderness: There is no abdominal tenderness.   Skin:     General: Skin is warm and dry.      Capillary Refill: Capillary refill takes less than 2 seconds.      Comments: Left upper arm AV Fistula noted with positive bruit and thrill   Neurological:      Mental Status: He is alert and oriented to person, place, and time. Mental status is at baseline.      GCS: GCS eye subscore is 4. GCS verbal subscore is 5. GCS motor subscore is 6.      Sensory: Sensation is intact.      Motor: Weakness (bilateral lower extremity) present.      Comments: Reports unable to lift legs against gravity due to weakness   Psychiatric:         Mood and Affect: Mood normal.                Significant Labs: All pertinent labs within the past 24 hours have been reviewed.  BMP:   Recent Labs   Lab 12/15/23  0401   GLU 88   *   K 4.9   CL 98   CO2 21*   BUN 37*   CREATININE 8.0*   CALCIUM 9.2   MG 2.2       CBC:   Recent Labs   Lab 12/14/23 0413 12/15/23  0401   WBC 3.78* 4.32   HGB 11.3* 11.6*   HCT 34.6* 36.2*    131*       CMP:   Recent Labs   Lab 12/14/23 0413 12/15/23  0401   * 132*   K 5.2* 4.9   CL 96 98   CO2 23 21*   GLU 82 88   BUN 42* 37*   CREATININE 9.0* 8.0*   CALCIUM 9.0 9.2   PROT 7.0 7.4   ALBUMIN 2.5* 2.6*   BILITOT 1.4* 1.4*   ALKPHOS 688* 730*   AST 53* 76*   ALT 31 44   ANIONGAP 13 13         Significant Imaging:     Imaging Results               CT Head Without Contrast (Final result)  Result time 12/12/23 07:46:24      Final result by Jj Serna MD (12/12/23 07:46:24)                   Impression:      No evidence of an acute intracranial abnormality.    Final read.      Electronically signed by: Jj Serna  Date:    12/12/2023  Time:    07:46               Narrative:    EXAMINATION:  CT HEAD WITHOUT CONTRAST    CLINICAL HISTORY:  Dizziness, persistent/recurrent, cardiac or vascular cause suspected;    TECHNIQUE:  Low dose axial images were obtained through the head.  Coronal and sagittal reformations were also performed. Contrast was not administered.    COMPARISON:  02/22/2023    FINDINGS:  Ventricles and sulci are normal in size for age without evidence of hydrocephalus.    Mild scattered hypoattenuation within the supratentorial white matter, nonspecific but probably reflecting sequelae of chronic microvascular ischemic change.   No evidence of acute parenchymal hemorrhage, edema, significant mass effect or major vascular distribution infarct.    No extra-axial blood or fluid collections.    No displaced calvarial fracture.    The mastoid air cells and visualized paranasal sinuses are essentially clear.    Calcific atherosclerosis of the internal carotid and vertebral arteries at the skull base.                                       X-Ray Chest AP Portable (Final result)  Result time 12/12/23 08:31:55      Final result by Angy Beckford MD (12/12/23 08:31:55)                   Impression:      Improvement in the appearance of the chest with decrease of lung infiltrates suggesting improving pulmonary edema      Electronically signed by: Angy Beckford MD  Date:    12/12/2023  Time:    08:31               Narrative:    EXAMINATION:  XR CHEST AP PORTABLE    CLINICAL HISTORY:  Dizziness and giddiness    TECHNIQUE:  Single frontal view of the chest was performed.    COMPARISON:  12/04/2023    FINDINGS:  Stable cardiomediastinal silhouette.   Appear mildly enlarged as before.  Right-sided internal jugular venous catheter remains in place with the distal tip at the level of the caval atrial junction.  Prominent lung markings suggesting mild pulmonary vascular distention with this is decreased compared to the prior exam.  No pleural effusion                                        Assessment/Plan:      * Abnormal MRI, lumbar spine  Concerning MRI spine for metas tic disease  Ongoing workup Onclology, Neurology, and Nephrology following  Neurosurgery consulted and following paraesthesias   Awaiting oncology opinion - labs pending immunofixation/geta 2 micro/protein electroph  PT/OT following anticipate snf discharge when ready      Hypertensive emergency  Resolved - controlled  Patient has a current diagnosis of Hypertensive emergency with end organ damage evidenced by acute kidney injury which is uncontrolled.  Latest blood pressure and vitals reviewed-   Temp:  [96.3 °F (35.7 °C)-98.5 °F (36.9 °C)]   Pulse:  [56-91]   Resp:  [17-18]   BP: (104-181)/(72-98)   SpO2:  [95 %-99 %] .   Patient currently on IV antihypertensives.   Home meds for hypertension were reviewed and noted below.   Hypertension Medications               hydrALAZINE (APRESOLINE) 100 MG tablet Take 1 tablet (100 mg total) by mouth 3 (three) times daily.    NIFEdipine (PROCARDIA XL) 90 MG (OSM) 24 hr tablet Take 1 tablet (90 mg total) by mouth once daily.    torsemide (DEMADEX) 20 MG Tab Take 1 tablet by mouth once daily.              Will aim for controlled BP reduction by medications noted above. Monitor and mitigate end organ damage as indicated.  BP improved with re-introducing home meds  Follow BP trends and adjust as warranted    ESRD on hemodialysis  Creatine stable for now. BMP reviewed- noted Estimated Creatinine Clearance: 11.2 mL/min (A) (based on SCr of 8 mg/dL (H)). according to latest data. Based on current GFR, CKD stage is end stage.  Monitor UOP and serial BMP and adjust  "therapy as needed. Renally dose meds. Avoid nephrotoxic medications and procedures.  -AV fistula noted without complications -  monitoring  -dialysis ongoing  -nephrology following most recent dialysis 12/14/23    Lower extremity weakness  Possibly due to hypertensive emergency; patient states unable to lift legs against gravity  S/p fall at home due to bilateral lower extremity weakness  CT head negative  Neurology consult placed      Serum total bilirubin elevated  Chronic condition noted  Monitor bmp    Intractable hiccups  Chronic condition  Continue prn thorazine       Type 2 diabetes mellitus with stage 5 chronic kidney disease  Patient's FSGs are controlled on current medication regimen.  Last A1c reviewed-   Lab Results   Component Value Date    HGBA1C 5.1 02/22/2023     Most recent fingerstick glucose reviewed- No results for input(s): "POCTGLUCOSE" in the last 24 hours.  Current correctional scale  Medium  Maintain anti-hyperglycemic dose as follows-   Antihyperglycemics (From admission, onward)      Start     Stop Route Frequency Ordered    12/12/23 0054  insulin aspart U-100 pen 0-10 Units         -- SubQ Before meals & nightly PRN 12/12/23 0054          Hold Oral hypoglycemics while patient is in the hospital.      VTE Risk Mitigation (From admission, onward)           Ordered     heparin (porcine) injection 4,000 Units  As needed (PRN)         12/14/23 0837     heparin (porcine) injection 5,000 Units  Every 8 hours         12/12/23 0054     IP VTE HIGH RISK PATIENT  Once         12/12/23 0054     Place sequential compression device  Until discontinued         12/12/23 0054                    Discharge Planning   WILMER: 12/18/2023     Code Status: Full Code   Is the patient medically ready for discharge?:     Reason for patient still in hospital (select all that apply): Patient trending condition, Consult recommendations, PT / OT recommendations, and Pending disposition  Discharge Plan A: Home   Discharge " Delays: None known at this time              RADHA JonesP  Department of Hospital Medicine   [facility]/Surg

## 2023-12-15 NOTE — ASSESSMENT & PLAN NOTE
"I reviewed his chart and discussed his case with his team.      I examined Mr. Mcfadden bedside.  He maintains capacity for complex medical decision-making.      I introduced myself and my role as palliative care physician.  He was agreeable to speaking.    We discussed his medical illness, prognosis, and values in detail.  Below is a brief summary of our discussion.      Briefly, he understands he is currently admitted and being treated for weakness.  Workup is concerning for cancer involving his lumbar sacral spine.  Biopsy further workup for still pending.    We discussed his prognosis.  At this point, I explained that his prognosis is uncertain.  I suspect the coming days will be very telling.  He understood and tells me it is in God's hands".     We discussed his values.  He was hopeful to get stronger and return home.  He was hopeful to spend more time with his family.  He was willing to pursue any available measure in order to get better.  He was okay with further hospitalization.  He was okay with pursuing biopsy.  He was okay with rehab placement if needed.  He has not worries.  He does not have any particular fears.  He finds strength in his Jain bridgette.    Ultimately, I recommended we continue with supportive measures being hope for the best and prepared for the worst.  As things change and the future becomes more clear I explained that I will continue to walk along with him and try to make recommendations aligned with his values.    We discussed HPOA. Our palliative RN will follow up.    We did not discuss code status.  Based on our values conversations clearly desires a FULL code status.    As his prognosis becomes more clear we will continue to address advance care planning topics.    Discussed with his primary team.    I appreciate being involved.  Hope I have been helpful.          "

## 2023-12-15 NOTE — PROGRESS NOTES
[facility] - Med/Surg  Hematology/Oncology  Consult Note    Patient Name: Catalino Mcfadden  MRN: [id number]  Admission Date: 12/11/2023  Hospital Length of Stay: 2 days  Code Status: Full Code   Attending Provider: Judy Lane MD  Consulting Provider: Brandi Harris NP  Primary Care Physician: Marty Rodríguez PA-C  Principal Problem:Abnormal MRI, lumbar spine    Consults  Subjective:     HPI: Catalino Mcfadden is a 57 y/o M with past medical history significant for HTN, ESRD on HD, DM2, gastroparesis, diabetic foot ulcer, chronic hiccups, and HLD who presented to the ED for with episodic dizziness, vomiting, and lower extremity weakness following a fall x 1 day. He reports nausea has been resolved with Zofran. He denies chest pain, shortness of breath, fever, chills, abdominal pain, headaches, light-headedness, numbness, tingling, or LOC. He reports he missed his Saturday hemodialysis treatment but states last week he received HD Tues, Wed, Thurs, during hospitaliztion. He was recently discharged 12/07. In the ED blood pressure 200s/100s, received IV and PO antihypertensive medication with mild improvement. Per med rec     We have been consulted for abnormal lesion found on spine concerning for metastatic disease versus multiple myeloma.  At the time of my visit he is sitting in the chair eating a popsicle.  He is complaining of weakness and unintentional weight loss.    12/15/2023:  Patient seen and examined by me.  No complaints or concerns    Oncology Treatment Plan:   [No matching plan found]    Medications:  Continuous Infusions:  Scheduled Meds:   amitriptyline  10 mg Oral QHS    heparin (porcine)  5,000 Units Subcutaneous Q8H    hydrALAZINE  100 mg Oral TID    mupirocin   Nasal BID    NIFEdipine  90 mg Oral Daily    pantoprazole  40 mg Oral Daily    sevelamer carbonate  800 mg Oral TID WM    sodium chloride 0.9%  10 mL Intravenous Q8H     PRN Meds:sodium chloride 0.9%, acetaminophen,  albuterol-ipratropium, aluminum-magnesium hydroxide-simethicone, chlorproMAZINE, glucagon (human recombinant), glucose, glucose, heparin (porcine), insulin aspart U-100, melatonin, naloxone, ondansetron, prochlorperazine, senna-docusate 8.6-50 mg, sodium chloride 0.9%     Review of patient's allergies indicates:  No Known Allergies     Past Medical History:   Diagnosis Date    Diabetes mellitus, type 2     Diabetic foot ulcer associated with diabetes mellitus due to underlying condition 07/16/2020    ESRD on hemodialysis     Hyperlipidemia     Hypertension     Obese      Past Surgical History:   Procedure Laterality Date    AV FISTULA PLACEMENT Left 07/06/2023    Procedure: CREATION, AV FISTULA;  Surgeon: Daniel Poon MD;  Location: [facility];  Service: Vascular;  Laterality: Left;  RN PREOP 6/28/2023  LABS DAY OF SURGERY    BONE BIOPSY Left 07/17/2020    Procedure: BIOPSY, BONE;  Surgeon: Verona Whitmore DPM;  Location: [facility];  Service: Podiatry;  Laterality: Left;    CERVICAL DISC SURGERY  07/11/2016    DEBRIDEMENT Left 07/17/2020    Procedure: DEBRIDEMENT;  Surgeon: Verona Whitmore DPM;  Location: [facility];  Service: Podiatry;  Laterality: Left;    DEBRIDEMENT OF FOOT Right     toes & plantar surface    ESOPHAGEAL MANOMETRY WITH MEASUREMENT OF IMPEDANCE N/A 03/27/2023    Procedure: MANOMETRY, ESOPHAGUS, WITH IMPEDANCE MEASUREMENT;  Surgeon: Roopa Pérez MD;  Location: [address]);  Service: Endoscopy;  Laterality: N/A;  Belching and rumination protocol please  instructions via portal - sm    ESOPHAGOGASTRODUODENOSCOPY N/A 12/16/2022    Procedure: EGD (ESOPHAGOGASTRODUODENOSCOPY);  Surgeon: Eren Francois MD;  Location: [facility];  Service: Endoscopy;  Laterality: N/A;  Pt. on Dialysis. K+ ordered prior to procedure.EC    ESOPHAGOGASTRODUODENOSCOPY N/A 12/5/2023    Procedure: EGD (ESOPHAGOGASTRODUODENOSCOPY);  Surgeon: Kash Johnston MD;  Location: [facility];  Service: Endoscopy;   Laterality: N/A;    FRACTURE SURGERY Right     medial ankle, metal plate present    INSERTION OF TUNNELED CENTRAL VENOUS CATHETER (CVC) WITH SUBCUTANEOUS PORT N/A 06/11/2021    Procedure: TUNNEL CATH INSERTION WITHOUT PORT;  Surgeon: Jose Manuel Diagnostic Provider;  Location: [facility] OR;  Service: Radiology;  Laterality: N/A;  11AM START---PHONE PREOP 6/10/21---COVID POSTIVE ON 7/2020---NO S/S    left leg orthopedic surgery Left     UPPER GASTROINTESTINAL ENDOSCOPY       Family History       Problem Relation (Age of Onset)    Heart disease Father          Tobacco Use    Smoking status: Never    Smokeless tobacco: Never   Substance and Sexual Activity    Alcohol use: Not Currently     Comment: occ rare beer    Drug use: No    Sexual activity: Not Currently       Review of Systems  As per mentioned in HPI; all other systems reviewed and negative  Objective:     Vital Signs (Most Recent):  Temp: 98.2 °F (36.8 °C) (12/15/23 1437)  Pulse: 85 (12/15/23 1437)  Resp: 17 (12/15/23 1437)  BP: (!) 167/83 (12/15/23 1437)  SpO2: 100 % (12/15/23 1437) Vital Signs (24h Range):  Temp:  [97.6 °F (36.4 °C)-98.5 °F (36.9 °C)] 98.2 °F (36.8 °C)  Pulse:  [81-93] 85  Resp:  [16-20] 17  SpO2:  [94 %-100 %] 100 %  BP: (133-167)/(78-84) 167/83     Weight: 90.8 kg (200 lb 2.8 oz)  Body mass index is 26.41 kg/m².  Body surface area is 2.16 meters squared.      Intake/Output Summary (Last 24 hours) at 12/15/2023 1604  Last data filed at 12/15/2023 1340  Gross per 24 hour   Intake 480 ml   Output --   Net 480 ml         Physical Exam  GENERAL: appears well-built, well-nourished.  No anxiety, no agitation, and in no distress.  Patient is awake, alert, oriented and cooperative.  HEENT:  Showed no congestion. Trachea is central no obvious icterus or pallor noted via video.  NECK:  Supple.  No JVD. No obvious cervical submental or supraclavicular adenopathy.  RS:the visualized portion of  Chest expands well. chest appears symmetric, no audible  wheezes.  ABDOMEN: the visualized portion of  abdomen appears undistended.  EXTREMITIES:  Without edema.  NEUROLOGICAL:  The patient is appropriate, higher functions are normal.  No neuro deficits.  No gait abnormality noted.  No confusion, no speech impediment. Cranial nerves are intact and show no deficit. No motor deficits noted through video.  SKIN MUSCULOSKELETAL: no joint or skeletal deformity, ambulating around room, no clubbing of nails.   Significant Labs:   CBC:   Recent Labs   Lab 12/14/23 0413 12/15/23  0401   WBC 3.78* 4.32   HGB 11.3* 11.6*   HCT 34.6* 36.2*    131*      and CMP:   Recent Labs   Lab 12/14/23 0413 12/15/23  0401   * 132*   K 5.2* 4.9   CL 96 98   CO2 23 21*   GLU 82 88   BUN 42* 37*   CREATININE 9.0* 8.0*   CALCIUM 9.0 9.2   PROT 7.0 7.4   ALBUMIN 2.5* 2.6*   BILITOT 1.4* 1.4*   ALKPHOS 688* 730*   AST 53* 76*   ALT 31 44   ANIONGAP 13 13         Diagnostic Results:  I have reviewed all pertinent imaging results/findings within the past 24 hours.    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 [S34.111A] 12/15/2023 Unknown    Goals of care, counseling/discussion [Z71.89] 12/14/2023 Not Applicable    Hypertensive emergency [I16.1] 12/12/2023 Yes    Lower extremity weakness [R29.898] 12/12/2023 Yes    Intractable hiccups [R06.6] 12/04/2023 Yes    Serum total bilirubin elevated [R17] 12/04/2023 Yes    ESRD on hemodialysis [N18.6, Z99.2] 02/22/2023 Not Applicable    Type 2 diabetes mellitus with stage 5 chronic kidney disease [E11.22, N18.5] 08/26/2014 Yes      Problems Resolved During this Admission:    Diagnosis Date Noted Date Resolved POA    ESRD (end stage renal disease) [N18.6]  12/12/2023 Yes       Abnormal lesion versus multiple myeloma:   -kappa and lambda  light chains were elevated which is seen in renal disease  -SPEP is pending  -skeletal survey did not show any lytic lesions  -patient has a follow-up scheduled in clinic.  He will likely need a biopsy of spinal lesions  -this can be worked up as an outpatient        Brandi Harris NP  Hematology/Oncology  [facility]/Surg

## 2023-12-15 NOTE — PLAN OF CARE
Problem: Adult Inpatient Plan of Care  Goal: Plan of Care Review  Outcome: Ongoing, Progressing  Goal: Optimal Comfort and Wellbeing  Outcome: Ongoing, Progressing     Problem: Diabetes Comorbidity  Goal: Blood Glucose Level Within Targeted Range  Outcome: Ongoing, Progressing     Problem: Infection  Goal: Absence of Infection Signs and Symptoms  Outcome: Ongoing, Progressing     Problem: Fall Injury Risk  Goal: Absence of Fall and Fall-Related Injury  Outcome: Ongoing, Progressing

## 2023-12-15 NOTE — PROGRESS NOTES
INPATIENT NEPHROLOGY Progress Note   [facility]    Patient Name: Catalino Mcfadden  Date: 12/15/2023    Reason for consultation: ESRD    Chief Complaint:   Chief Complaint   Patient presents with    Dizziness    Vomiting    Fall     For several days        History of Present Illness:  Catalino Mcfadden is a 59 y/o M with past medical history significant for HTN, ESRD on HD, DM2, gastroparesis, diabetic foot ulcer, chronic hiccups, and HLD who presented to the ED for with episodic dizziness, vomiting, and lower extremity weakness following a fall x 1 day. He reports nausea has been resolved with Zofran. He denies chest pain, shortness of breath, fever, chills, abdominal pain, headaches, light-headedness, numbness, tingling, or LOC. He reports he missed his Saturday hemodialysis treatment but states last week he received HD Tues, Wed, Thurs, during hospitaliztion. He was recently discharged 12/07. In the ED blood pressure 200s/100s, received IV and PO antihypertensive medication with mild improvemnt. He states he did not take his medication yesterday due to N/V. CT Head negative. MRI brain neg. Consulted for dialysis.    Interval History:  12/12- BP better, on RA, no complaints  12/13- MRI L spine abnormal- getting w/u for malignancy vs paraprotein- neuro, neurosurg and heme/onc consulted  12/14 HD today, BP is high since admission- he says his BP isn't normally high  12/15 BP trends are better- trend again today- may adjust nifedipine tomorrow if BP goes up again    Plan of Care:    Assessment:  ESRD on HD TTS  HTN emergency- resolved; HTN  Hyponatremia  Hyperkalemia  SHPT  Anemia of CKD    Plan:    - continue HD TTS  - continue home BP meds- push UF 3-4L- if BP still high post HD will add BP medication  - adjust dialysate- renal diet, 1.5L fluid restriction  - continue binder with meals  - no acute CLEMENT needs    Thank you for allowing us to participate in this patient's care. We will continue to  follow.    Vital Signs:  Temp Readings from Last 3 Encounters:   12/15/23 97.6 °F (36.4 °C) (Oral)   12/07/23 98.3 °F (36.8 °C)   07/06/23 98.4 °F (36.9 °C) (Oral)       Pulse Readings from Last 3 Encounters:   12/15/23 86   12/07/23 99   12/01/23 95       BP Readings from Last 3 Encounters:   12/15/23 (!) 160/82   12/07/23 (!) 185/89   12/01/23 (!) 153/76       Weight:  Wt Readings from Last 3 Encounters:   12/14/23 90.8 kg (200 lb 2.8 oz)   12/05/23 102.1 kg (225 lb)   12/01/23 96.1 kg (211 lb 13.8 oz)       Medications:  Scheduled Meds:   amitriptyline  10 mg Oral QHS    heparin (porcine)  5,000 Units Subcutaneous Q8H    hydrALAZINE  100 mg Oral TID    mupirocin   Nasal BID    NIFEdipine  90 mg Oral Daily    pantoprazole  40 mg Oral Daily    sevelamer carbonate  800 mg Oral TID WM    sodium chloride 0.9%  10 mL Intravenous Q8H     Continuous Infusions:  PRN Meds:.sodium chloride 0.9%, acetaminophen, albuterol-ipratropium, aluminum-magnesium hydroxide-simethicone, chlorproMAZINE, glucagon (human recombinant), glucose, glucose, heparin (porcine), insulin aspart U-100, melatonin, naloxone, ondansetron, prochlorperazine, senna-docusate 8.6-50 mg, sodium chloride 0.9%  No current facility-administered medications on file prior to encounter.     Current Outpatient Medications on File Prior to Encounter   Medication Sig Dispense Refill    hydrALAZINE (APRESOLINE) 100 MG tablet Take 1 tablet (100 mg total) by mouth 3 (three) times daily. 90 tablet 0    NIFEdipine (PROCARDIA XL) 90 MG (OSM) 24 hr tablet Take 1 tablet (90 mg total) by mouth once daily. 90 tablet 3    promethazine (PHENERGAN) 12.5 MG Tab Take 1 tablet (12.5 mg total) by mouth every 6 (six) hours as needed (nausea and vomiting). 30 tablet 3    amitriptyline (ELAVIL) 10 MG tablet Take 1 tablet (10 mg total) by mouth every evening. (Patient not taking: Reported on 12/12/2023) 30 tablet 0    GAVILYTE-C 240-22.72-6.72 -5.84 gram SolR Take 4,000 mLs by mouth  once.      heparin sod,pork in 0.45% NaCl (HEPARIN, PORCINE, 100 UNITS) 100 unit/100 mL (1 unit/mL) SolP in sodium chloride 0.45 % 100 mL infusion Inject 1 mL/hr into the vein continuous.      methoxy peg-epoetin beta (MIRCERA INJ) Inject 50 mcg into the skin every 28 days.      sevelamer carbonate (RENVELA) 800 mg Tab Take 1 tablet (800 mg total) by mouth 3 (three) times daily with meals. (Patient not taking: Reported on 12/12/2023) 90 tablet 11    sucroferric oxyhydroxide (VELPHORO) 500 mg Chew Take 1 tablet by mouth once daily.      torsemide (DEMADEX) 20 MG Tab Take 1 tablet by mouth once daily.       Review of Systems:  Neg    Physical Exam:  General Appearance:    NAD, AAO x 3, cooperative, appears stated age   Head:    Normocephalic, atraumatic   Eyes:    PER, EOMI, and conjunctiva/sclera clear bilaterally       Mouth:   Moist mucus membranes, no thrush or oral lesions,       normal dentition   Back:     No CVA tenderness   Lungs:     Clear to auscultation bilaterally, no wheezes, crackles,           rales or rhonchi, symmetric air movement, respirations unlabored   Chest wall:    No tenderness or deformity   Heart:    Regular rate and rhythm, S1 and S2 normal, no murmur, rub   or gallop   Abdomen:     Soft, non-tender, non-distended, bowel sounds active all four   quadrants, no RT or guarding, no masses, no organomegaly   Extremities:   Warm and well perfused, distal pulses are intact, no             cyanosis or peripheral edema   MSK:   No joint or muscle swelling, tenderness or deformity   Skin:   Skin color, texture, turgor normal, no rashes or lesions   Neurologic/Psychiatric:   CNII-XII intact, normal strength and sensation       throughout, no asterixis; normal affect, memory, judgement     and insight      Results:  Lab Results   Component Value Date     (L) 12/15/2023    K 4.9 12/15/2023    CL 98 12/15/2023    CO2 21 (L) 12/15/2023    BUN 37 (H) 12/15/2023    CREATININE 8.0 (H) 12/15/2023     CALCIUM 9.2 12/15/2023    ANIONGAP 13 12/15/2023    ESTGFRAFRICA 10.4 (A) 02/23/2022    EGFRNONAA 9.0 (A) 02/23/2022       Lab Results   Component Value Date    CALCIUM 9.2 12/15/2023    PHOS 2.9 12/07/2023       Recent Labs   Lab 12/15/23  0401   WBC 4.32   RBC 4.02*   HGB 11.6*   HCT 36.2*   *   MCV 90   MCH 28.9   MCHC 32.0         I have personally reviewed pertinent radiological imaging and reports.    I have spent > 35 minutes providing care for this patient for the above diagnoses. These services have included chart/data/imaging review, evaluation, exam, formulation of plan, , note preparation, and discussions with staff involved in this patient's care.    Oscar Canas MD MPH  [facility] [address] [address]  [phone] (p)  [phone] (f)

## 2023-12-15 NOTE — PLAN OF CARE
[employer] auth obtained for rehab placement. [facility] to admit Monday if medically clear       12/15/23 2891   Post-Acute Status   Post-Acute Authorization Placement   Post-Acute Placement Status Pending Bed Availability        Name: Charmaine Appiah      :       MRN: [id number]  Encounter Provider: Atiya Riley DO  Encounter Date: 2023   Encounter department: [address] [place] [address]     1  Anemia, unspecified type  Assessment & Plan:  Patient was admitted to [facility] 2022 due to anemia  Patient thought to have myelosuppression caused by hydroxyurea  Hematologist recommended discontinuing this medication  He also had bone myoneural biopsy which was negative  Since that time, his hemoglobin and hematocrit have been stable  Patient was also placed back on hydroxyurea Monday, Wednesday, Friday  Continue follow-up with hematology as directed    Orders:  -     CBC and differential; Future; Expected date: 2023    2  Polycythemia vera ([facility] [address] )  Assessment & Plan:  Stable polycythemia  Patient had recent phlebotomy  He is also back on hydroxyurea (Monday, Wednesday, Friday)  Continue follow-up with hematologist as directed    Orders:  -     CBC and differential; Future; Expected date: 2023    3  Benign essential hypertension  Assessment & Plan:  Blood pressure controlled on amlodipine 10 and metoprolol 50 twice daily  Orders:  -     TSH, 3rd generation with Free T4 reflex; Future; Expected date: 2023    4  Prediabetes  Assessment & Plan:  Continue reduced carb diet and exercise  Patient is due for fasting blood work  We will call with results    Orders:  -     Comprehensive metabolic panel; Future; Expected date: 2023  -     Hemoglobin A1C; Future; Expected date: 2023    5  Stage 3b chronic kidney disease [facility])  Assessment & Plan:  Lab Results   Component Value Date    EGFR 51 2022    EGFR 56 10/24/2022    EGFR 78 10/13/2022    CREATININE 1 34 (H) 2022    CREATININE 1 24 10/24/2022    CREATININE 0 94 10/13/2022   Last GFR from 2022 was 51  Patient is due for labs    We will call with results    Orders:  -     Comprehensive metabolic panel; Future; Expected date: 01/09/2023    6  Coronary artery disease without angina pectoris, unspecified vessel or lesion type, unspecified whether native or transplanted heart  Assessment & Plan:  Status post two-vessel stent over 10 years ago  Patient also had pacemaker implant 2022  Doing well without chest pain or shortness of breath  Continue follow-up with cardiology as directed      7  Cigarette smoker  Assessment & Plan:  Patient still smoking approximately 1/2 pack/day  Counseled again  Patient is due for LDCT chest   Order placed today  Orders:  -     CT lung screening program; Future; Expected date: 01/09/2023    8  Chronic obstructive pulmonary disease, unspecified COPD type ([facility] [address] )  Assessment & Plan:  Still smoking 1/2 pack a day  Counseled again  Stable on as needed use of albuterol HFA      9  Screening for prostate cancer  -     PSA, Total Screen; Future; Expected date: 01/09/2023    10  Screening cholesterol level  -     Lipid Panel with Direct LDL reflex; Future; Expected date: 01/09/2023      Pt continues to decline all recommended vaccinations    6 mos, FBW at some point prior to appointment       Subjective     Patient presents for recheck of chronic medical problems today  Patient still being followed by hematologist and cardiologist   Compliant with prescribed medications  Patient had limited labs done on December 28    Review of Systems   Respiratory: Negative  Cardiovascular: Negative  Gastrointestinal: Negative  Genitourinary: Negative          Past Medical History:   Diagnosis Date   • Arthritis     both shoulders   • CAD (coronary artery disease)     Last Assessed:  11/4/13   • Cigarette smoker    • Colon polyp    • Elevated prostate specific antigen (PSA)     Last Assessed:  12/21/17   • Enlarged prostate    • Exercise involving walking     in season hunt's and fish's/ 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"   • Full dentures     but doesnt wear them   • History of 2019 novel coronavirus disease (COVID-19) 02/2021    hospital for 2 days but not in ICU/"mainly dehydrated" "also fever/oxygen below 90"   • History of coronary artery stent placement     x2 in 2008 or so; sees Josefa Velazquez-- Dr Joel Hernandez cardio   • [facility] (hard of hearing)     no hearing aids yet   • Hyperlipidemia     Last Assessed:  11/24/17   • Hypertension     Benign essential, Last Assessed:  11/24/17   • LBBB (left bundle branch block)    • Nocturia    • Polycythemia vera (HCC)     (per history in chart)   • PVD (peripheral vascular disease) ([facility] [address] )    • RBC abnormality     pt reports told has high Red blood cells/goes to infusion center regularly next appt 10/8/21 hematologist is Dr Nancy Karimi of [address] [place] [address](had been Dr Jarek Knowles)   • Seasickness    • Shortness of breath     "if goes up steps gets SOB, had for awhile"   • Slow urinary stream     "sometimes feels like bladder isnt all the way empty"   • Snores    • Wears glasses     reading     Past Surgical History:   Procedure Laterality Date   • ANGIOPLASTY     • APPENDECTOMY     • CARDIAC CATHETERIZATION N/A 7/28/2022    Procedure: CARDIAC TEMPORARY PACEMAKER;  Surgeon: Eleanor Salas MD;  [facility]: [facility]; Service: Cardiology   • CARDIAC ELECTROPHYSIOLOGY PROCEDURE N/A 7/29/2022    Procedure: Cardiac pacer implant;  Surgeon: Alfie Miller MD;  [facility]: [facility]; Service: Cardiology   • CATARACT EXTRACTION Bilateral    • CORONARY ARTERY BYPASS GRAFT     • CT CYSTOGRAM  11/4/2021   • ELBOW SURGERY Right    • FL CYSTOGRAM  10/27/2021   • IR BIOPSY BONE MARROW  10/14/2022   • KNEE ARTHROSCOPY Right    • IA COLONOSCOPY FLX DX W/COLLJ SPEC WHEN PFRMD N/A 5/22/2017    Procedure: COLONOSCOPY;  Surgeon: Roxie Morfin DO;  Location: [facility];   Service: Gastroenterology   • PROSTATE BIOPSY     • PROSTATECTOMY N/A 10/18/2021    Procedure: ROBOTIC SUBTOTAL/SUPRAPUBIC PROSTATECTOMY;  Surgeon: Hira Echevarria MD;  Location: [facility] OR;  Service: Urology   • SHOULDER ARTHROSCOPY Right    • TONSILLECTOMY       Family History   Problem Relation Age of Onset   • No Known Problems Mother    • Heart disease Father      Social History     Socioeconomic History   • Marital status: /Civil Union     Spouse name: None   • Number of children: None   • Years of education: None   • Highest education level: None   Occupational History   • None   Tobacco Use   • Smoking status: Every Day     Packs/day: 0 50     Years: 35 00     Pack years: 17 50     Types: Cigarettes   • Smokeless tobacco: Never   • Tobacco comments:     a little less than 1/2ppd, trying to cut back before surgery last smoked 10/16   Vaping Use   • Vaping Use: Never used   Substance and Sexual Activity   • Alcohol use: Never   • Drug use: No   • Sexual activity: Not Currently     Comment: defer   Other Topics Concern   • None   Social History Narrative    Always uses seatbelt    Feels safe at home    Uses safety equipment     Social Determinants of Health     Financial Resource Strain: Not on file   Food Insecurity: No Food Insecurity   • Worried About Running Out of Food in the Last Year: Never true   • Ran Out of Food in the Last Year: Never true   Transportation Needs: No Transportation Needs   • Lack of Transportation (Medical): No   • Lack of Transportation (Non-Medical):  No   Physical Activity: Not on file   Stress: Not on file   Social Connections: Not on file   Intimate Partner Violence: Not on file   Housing Stability: Low Risk    • Unable to Pay for Housing in the Last Year: No   • Number of Places Lived in the Last Year: 1   • Unstable Housing in the Last Year: No     Current Outpatient Medications on File Prior to Visit   Medication Sig   • acetaminophen (TYLENOL) 500 mg tablet Take 500 mg by mouth every 6 (six) hours as needed for mild pain   • albuterol (Ventolin HFA) 90 mcg/act inhaler Inhale 2 puffs every 4 (four) hours as needed for wheezing   • amLODIPine (NORVASC) 10 mg tablet Take 10 mg by mouth every evening    • aspirin (ECOTRIN LOW STRENGTH) 81 mg EC tablet Take 81 mg by mouth daily   • atorvastatin (LIPITOR) 80 mg tablet TAKE 1 TABLET BY MOUTH EVERY DAY IN THE EVENING   • finasteride (PROSCAR) 5 mg tablet TAKE 1 TABLET BY MOUTH EVERY DAY   • hydroxyurea (HYDREA) 500 mg capsule Take 500mg once a day on Monday, Wednesday & Friday  Can Take in AM or PM    • metoprolol tartrate (LOPRESSOR) 50 mg tablet Take 50 mg by mouth 2 (two) times a day    • Multiple Vitamin (MULTIVITAMIN) capsule Take 1 capsule by mouth daily   • nitroglycerin (NITROSTAT) 0 4 mg SL tablet Place 1 tablet (0 4 mg total) under the tongue every 5 (five) minutes as needed for chest pain   • Omega-3 Fatty Acids (FISH OIL) 1200 MG CAPS Take 1,200 mg by mouth 2 (two) times a day     • lidocaine (Lidoderm) 5 % Apply 1 patch topically daily Remove & Discard patch within 12 hours or as directed by MD (Patient not taking: Reported on 1/9/2023)     Allergies   Allergen Reactions   • Other Other (See Comments)     Pt and wife reports took a medication years ago at work cant recall the name or what it was for"     "couldn't talk and cheeks swelled and also right hand"     Immunization History   Administered Date(s) Administered   • Pneumococcal Polysaccharide PPV23 1947       Objective     /70 (BP Location: Right arm, Patient Position: Sitting, Cuff Size: Adult)   Pulse 60   Temp (!) 97 3 °F (36 3 °C) (Temporal)   Resp 16   Ht 5' 7 72" (1 72 m)   Wt 77 1 kg (170 lb)   SpO2 98%   BMI 26 06 kg/m²     Physical Exam  Cardiovascular:      Rate and Rhythm: Normal rate and regular rhythm  Heart sounds: Normal heart sounds  Comments: Carotids: no bruits  Ext: no edema  Pulmonary:      Effort: Pulmonary effort is normal  No respiratory distress  Breath sounds: No wheezing or rales     Psychiatric:         Behavior: Behavior normal          Thought Content: Thought content normal        Vann Lanes, DO

## 2023-12-15 NOTE — PT/OT/SLP PROGRESS
Occupational Therapy   Treatment    Name: Catalino Mcfadden  MRN: [id number]  Admitting Diagnosis:  Abnormal MRI, lumbar spine       Recommendations:     Discharge Recommendations: High Intensity Therapy  Discharge Equipment Recommendations:  none, to be determined by next level of care  Barriers to discharge:  None    Assessment:     Catalino Mcfadden is a 58 y.o. male with a medical diagnosis of Abnormal MRI, lumbar spine.  He presents with the following performance deficits affecting function are weakness, impaired endurance, impaired self care skills, impaired functional mobility, gait instability, decreased upper extremity function, decreased lower extremity function.     Rehab Prognosis:  Good; patient would benefit from acute skilled OT services to address these deficits and reach maximum level of function.       Plan:     Patient to be seen 5 x/week to address the above listed problems via self-care/home management, therapeutic activities, therapeutic exercises  Plan of Care Expires: 01/09/24  Plan of Care Reviewed with: patient    Subjective     Chief Complaint: None  Patient/Family Comments/goals: Wants to get stronger and healthier  Pain/Comfort:  Pain Rating 1: 0/10    Objective:     Communicated with: Nurse prior to session.  Patient found up in chair with  (seated up in bedside chair with no chair alarm) upon OT entry to room.    General Precautions: Standard, fall    Orthopedic Precautions:N/A  Braces: N/A  Respiratory Status: Room air     Occupational Performance:     Functional Mobility/Transfers:  Patient completed Sit <> Stand Transfer with stand by assistance  with  no assistive device   Patient completed Toilet Transfer Stand Pivot technique with stand by assistance with  grab bars  Functional Mobility: CGA/SBA functional ambulation bedside chair <> toilet with no AD; demonstrates instability    Activities of Daily Living:  Lower Body Dressing: stand by assistance from bedside chair     AMPA 6 Click ADL:  18    Treatment & Education:  - OTR reinforcing education/instruction regarding OT role/POC, safety awareness/fall prevention including use of call button for staff assistance with all mobility  - OTR instructing on and patient performing chair push ups: 15 reps with 5 second hold end range    Patient left up in chair with all lines intact, call button in reach, and PCT present    GOALS:   Multidisciplinary Problems       Occupational Therapy Goals          Problem: Occupational Therapy    Goal Priority Disciplines Outcome Interventions   Occupational Therapy Goal     OT, PT/OT Ongoing, Progressing    Description: Goals to be met by: 1/9/24     Patient will increase functional independence with ADLs by performing:    Feeding with Modified [place].  UE Dressing with Modified [place].  LE Dressing with Modified [place].  Grooming while standing at sink with Modified [place].  Toileting from raised toilet with Modified [place] for hygiene and clothing management.   Bathing from  shower chair/bench with Modified [place].  Toilet transfer to raised toilet with Modified [place].  Increased strength, coordination, and functional activity tolerance for ADL's/IADL's..                         Time Tracking:     OT Date of Treatment: 12/15/23  OT Start Time: 1129  OT Stop Time: 1138  OT Total Time (min): 9 min    Billable Minutes:Therapeutic Exercise 9    OT/JOS: OT     Number of JOS visits since last OT visit: 0    12/15/2023

## 2023-12-15 NOTE — ASSESSMENT & PLAN NOTE
Creatine stable for now. BMP reviewed- noted Estimated Creatinine Clearance: 11.2 mL/min (A) (based on SCr of 8 mg/dL (H)). according to latest data. Based on current GFR, CKD stage is end stage.  Monitor UOP and serial BMP and adjust therapy as needed. Renally dose meds. Avoid nephrotoxic medications and procedures.  -AV fistula noted without complications -  monitoring  -dialysis ongoing  -nephrology following most recent dialysis 12/14/23

## 2023-12-15 NOTE — CONSULTS
"[facility]  Palliative Medicine  Consult Note    Patient Name: Catalino Mcfadden  MRN: [id number]  Admission Date: 12/11/2023  Hospital Length of Stay: 1 days  Code Status: Full Code   Attending Provider: Judy Lane MD  Consulting Provider: Jung Elder MD  Primary Care Physician: Marty Rodríguez PA-C  Principal Problem:Abnormal MRI, lumbar spine    Patient information was obtained from patient, past medical records, and primary team.      Inpatient consult to Palliative Care  Consult performed by: Jung Elder MD  Consult ordered by: Belén Christine FNP        Assessment/Plan:     Palliative Care  Goals of care, counseling/discussion  I reviewed his chart and discussed his case with his team.      I examined Mr. Mcfadden bedside.  He maintains capacity for complex medical decision-making.      I introduced myself and my role as palliative care physician.  He was agreeable to speaking.    We discussed his medical illness, prognosis, and values in detail.  Below is a brief summary of our discussion.      Briefly, he understands he is currently admitted and being treated for weakness.  Workup is concerning for cancer involving his lumbar sacral spine.  Biopsy further workup for still pending.    We discussed his prognosis.  At this point, I explained that his prognosis is uncertain.  I suspect the coming days will be very telling.  He understood and tells me it is in God's hands".     We discussed his values.  He was hopeful to get stronger and return home.  He was hopeful to spend more time with his family.  He was willing to pursue any available measure in order to get better.  He was okay with further hospitalization.  He was okay with pursuing biopsy.  He was okay with rehab placement if needed.  He has not worries.  He does not have any particular fears.  He finds strength in his [employer] bridgette.    Ultimately, I recommended we continue with supportive measures being hope for " "the best and prepared for the worst.  As things change and the future becomes more clear I explained that I will continue to walk along with him and try to make recommendations aligned with his values.    We discussed HPOA. Our palliative RN will follow up.    We did not discuss code status.  Based on our values conversations clearly desires a FULL code status.    As his prognosis becomes more clear we will continue to address advance care planning topics.    Discussed with his primary team.    I appreciate being involved.  Hope I have been helpful.                Thank you for your consult. I will follow-up with patient. Please contact us if you have any additional questions.    Subjective:     HPI:   Per admit H&P: "Catalino Mcfadden is a 57 y/o M with past medical history significant for HTN, ESRD on HD, DM2, gastroparesis, diabetic foot ulcer, chronic hiccups, and HLD who presented to the ED for with episodic dizziness, vomiting, and lower extremity weakness following a fall x 1 day. He reports nausea has been resolved with Zofran. He denies chest pain, shortness of breath, fever, chills, abdominal pain, headaches, light-headedness, numbness, tingling, or LOC. He reports he missed his Saturday hemodialysis treatment but states last week he received HD Tues, Wed, Thurs, during hospitaliztion. He was recently discharged 12/07. Lab work reflective of ESRD with elevated bili, ALP, and AST. In the ED blood pressure 200s/100s, received IV and PO antihypertensive medication with mild improvemnt. He states he did not take his medication yesterday due to N/V. During examination patient states he is unable to lift his lower extremities, however, was able to walk into the ED with assistance from a friend. CT Head negative. Admitted to hospital medicine for hypertensive emergency evaluation and treatment with nephrology and neurology consults placed."    Work up with MRI has revealed what appears to be malignancy involving " lumbosacral spine. Biopsy pending. Onc has evaluated; work up pending. NSGY has evaluated without plans for intervention at the moment. At this point he carries a guarded prognosis. I have been asked to assist with goals of care and code status discussion.    Hospital Course:  No notes on file    Interval History: See HPI    Past Medical History:   Diagnosis Date    Diabetes mellitus, type 2     Diabetic foot ulcer associated with diabetes mellitus due to underlying condition 07/16/2020    ESRD on hemodialysis     Hyperlipidemia     Hypertension     Obese        Past Surgical History:   Procedure Laterality Date    AV FISTULA PLACEMENT Left 07/06/2023    Procedure: CREATION, AV FISTULA;  Surgeon: Daniel Poon MD;  Location: [facility];  Service: Vascular;  Laterality: Left;  RN PREOP 6/28/2023  LABS DAY OF SURGERY    BONE BIOPSY Left 07/17/2020    Procedure: BIOPSY, BONE;  Surgeon: Verona Whitmore DPM;  Location: [facility];  Service: Podiatry;  Laterality: Left;    CERVICAL DISC SURGERY  07/11/2016    DEBRIDEMENT Left 07/17/2020    Procedure: DEBRIDEMENT;  Surgeon: Verona Whitmore DPM;  Location: [facility];  Service: Podiatry;  Laterality: Left;    DEBRIDEMENT OF FOOT Right     toes & plantar surface    ESOPHAGEAL MANOMETRY WITH MEASUREMENT OF IMPEDANCE N/A 03/27/2023    Procedure: MANOMETRY, ESOPHAGUS, WITH IMPEDANCE MEASUREMENT;  Surgeon: Roopa Pérez MD;  Location: [facility] ([address] [place] [address]);  Service: Endoscopy;  Laterality: N/A;  Belching and rumination protocol please  instructions via portal -     ESOPHAGOGASTRODUODENOSCOPY N/A 12/16/2022    Procedure: EGD (ESOPHAGOGASTRODUODENOSCOPY);  Surgeon: Eren Francois MD;  Location: [facility];  Service: Endoscopy;  Laterality: N/A;  Pt. on Dialysis. K+ ordered prior to procedure.EC    ESOPHAGOGASTRODUODENOSCOPY N/A 12/5/2023    Procedure: EGD (ESOPHAGOGASTRODUODENOSCOPY);  Surgeon: Kash Johnston MD;  Location: [facility];  Service: Endoscopy;  Laterality:  N/A;    FRACTURE SURGERY Right     medial ankle, metal plate present    INSERTION OF TUNNELED CENTRAL VENOUS CATHETER (CVC) WITH SUBCUTANEOUS PORT N/A 06/11/2021    Procedure: TUNNEL CATH INSERTION WITHOUT PORT;  Surgeon: Jose Manuel Diagnostic Provider;  Location: [facility] OR;  Service: Radiology;  Laterality: N/A;  11AM START---PHONE PREOP 6/10/21---COVID POSTIVE ON 7/2020---NO S/S    left leg orthopedic surgery Left     UPPER GASTROINTESTINAL ENDOSCOPY         Review of patient's allergies indicates:  No Known Allergies    Medications:  Continuous Infusions:  Scheduled Meds:   amitriptyline  10 mg Oral QHS    heparin (porcine)  5,000 Units Subcutaneous Q8H    hydrALAZINE  100 mg Oral TID    mupirocin   Nasal BID    NIFEdipine  90 mg Oral Daily    pantoprazole  40 mg Oral Daily    sevelamer carbonate  800 mg Oral TID WM    sodium chloride 0.9%  10 mL Intravenous Q8H     PRN Meds:sodium chloride 0.9%, acetaminophen, albuterol-ipratropium, aluminum-magnesium hydroxide-simethicone, chlorproMAZINE, glucagon (human recombinant), glucose, glucose, heparin (porcine), insulin aspart U-100, melatonin, naloxone, ondansetron, prochlorperazine, senna-docusate 8.6-50 mg, sodium chloride 0.9%    Family History       Problem Relation (Age of Onset)    Heart disease Father          Tobacco Use    Smoking status: Never    Smokeless tobacco: Never   Substance and Sexual Activity    Alcohol use: Not Currently     Comment: occ rare beer    Drug use: No    Sexual activity: Not Currently       Review of Systems  Objective:     Vital Signs (Most Recent):  Temp: 96.3 °F (35.7 °C) (12/14/23 0715)  Pulse: 80 (12/14/23 0715)  Resp: 18 (12/14/23 0313)  BP: (!) 165/78 (12/14/23 0715)  SpO2: 96 % (12/14/23 0715) Vital Signs (24h Range):  Temp:  [96.3 °F (35.7 °C)-98.5 °F (36.9 °C)] 96.3 °F (35.7 °C)  Pulse:  [76-95] 80  Resp:  [17-18] 18  SpO2:  [95 %-99 %] 96 %  BP: (158-180)/(74-87) 165/78     Weight: 90.8 kg (200 lb 2.8 oz)  Body mass index is 26.41  kg/m².       Physical Exam  Vitals reviewed.   Constitutional:       General: He is not in acute distress.     Appearance: He is not ill-appearing or toxic-appearing.   HENT:      Head: Normocephalic and atraumatic.      Right Ear: External ear normal.      Left Ear: External ear normal.      Mouth/Throat:      Mouth: Mucous membranes are moist.      Pharynx: No oropharyngeal exudate.   Eyes:      General:         Right eye: No discharge.         Left eye: No discharge.      Extraocular Movements: Extraocular movements intact.   Cardiovascular:      Rate and Rhythm: Normal rate.   Pulmonary:      Effort: Pulmonary effort is normal. No respiratory distress.   Abdominal:      General: There is no distension.      Palpations: Abdomen is soft.      Tenderness: There is no abdominal tenderness.   Skin:     General: Skin is warm.   Neurological:      General: No focal deficit present.      Mental Status: He is alert and oriented to person, place, and time.   Psychiatric:         Mood and Affect: Mood normal.         Behavior: Behavior normal.         Thought Content: Thought content normal.         Judgment: Judgment normal.          Review of Symptoms      Symptom Assessment (ESAS 0-10 Scale)  Pain:  0  Dyspnea:  0  Anxiety:  2  Nausea:  0  Depression:  0  Anorexia:  7  Fatigue:  6  Insomnia:  0  Restlessness:  0  Agitation:  0         Performance Status:  50    Living Arrangements:  Lives alone and Lives in home    Psychosocial/Cultural:   See Palliative Psychosocial Note: No  **Primary  to Follow**  Palliative Care  Consult: No      Advance Care Planning   Advance Directives:     Decision Making:  Patient answered questions  Goals of Care: The patient endorses that what is most important right now is to focus on spending time at home, avoiding the hospital, and remaining as independent as possible    Accordingly, we have decided that the best plan to meet the patient's goals includes  continuing with treatment         Significant Labs: BMP:   Recent Labs   Lab 12/14/23 0413   GLU 82   *   K 5.2*   CL 96   CO2 23   BUN 42*   CREATININE 9.0*   CALCIUM 9.0   MG 2.2     CBC:   Recent Labs   Lab 12/13/23  0412 12/14/23 0413   WBC 4.45 3.78*   HGB 11.4* 11.3*   HCT 35.3* 34.6*    158     CBC:   Recent Labs   Lab 12/14/23 0413   WBC 3.78*   HGB 11.3*   HCT 34.6*   MCV 89        BMP:  Recent Labs   Lab 12/14/23 0413   GLU 82   *   K 5.2*   CL 96   CO2 23   BUN 42*   CREATININE 9.0*   CALCIUM 9.0   MG 2.2     LFT:  Lab Results   Component Value Date    AST 53 (H) 12/14/2023    ALKPHOS 688 (H) 12/14/2023    BILITOT 1.4 (H) 12/14/2023     Albumin:   Albumin   Date Value Ref Range Status   12/14/2023 2.5 (L) 3.5 - 5.2 g/dL Final     Protein:   Total Protein   Date Value Ref Range Status   12/14/2023 7.0 6.0 - 8.4 g/dL Final     Lactic acid:   Lab Results   Component Value Date    LACTATE 0.8 07/16/2020       Significant Imaging: I have reviewed all pertinent imaging results/findings within the past 24 hours.      I spent a total of 85 minutes on the day of the visit. This includes face to face time in discussion of goals of care, symptom assessment, coordination of care and emotional support.  This also includes non-face to face time preparing to see the patient (eg, review of tests/imaging), obtaining and/or reviewing separately obtained history, documenting clinical information in the electronic or other health record, independently interpreting results and communicating results to the patient/family/caregiver, or care coordinator.    Jung Elder MD  Palliative Medicine  [facility]/Surg

## 2023-12-15 NOTE — PLAN OF CARE
[employer] auth requested for NS rehab          12/15/23 1152   Post-Acute Status   Post-Acute Authorization Placement   Post-Acute Placement Status Pending payor review/awaiting authorization (if required)

## 2023-12-15 NOTE — PT/OT/SLP PROGRESS
Physical Therapy Treatment    Patient Name:  Catalino Mcfadden   MRN:  [id number]    Recommendations:     Discharge Recommendations: High Intensity Therapy  Discharge Equipment Recommendations: other (see comments) (TBD)    Assessment:     Catalino Mcfadden is a 58 y.o. male admitted with a medical diagnosis of Abnormal MRI, lumbar spine.  He presents with the following impairments/functional limitations: weakness, impaired endurance, impaired balance, gait instability, impaired functional mobility, decreased lower extremity function  .    Rehab Prognosis: Good; patient would benefit from acute skilled PT services to address these deficits and reach maximum level of function.    Recent Surgery: * No surgery found *      Plan:     During this hospitalization, patient to be seen daily to address the identified rehab impairments via gait training, therapeutic activities, therapeutic exercises and progress toward the following goals:    Plan of Care Expires:  01/17/24    Subjective     Patient/Family Comments/goals: pleasant, agrees to work with PT.       Objective:     Communicated with nurse (David) prior to session.  Patient found sitting edge of bed with   upon PT entry to room.     General Precautions: Standard, fall  Orthopedic Precautions: N/A  Braces: N/A  Respiratory Status: Room air     Functional Mobility training:  Bed Mobility: N/T due to found seated at eob  Transfers:     Sit to Stand:  contact guard assistance with no AD, rolling walker, and x 5 reps  Bed to Chair: contact guard assistance with  no AD  using  Step Transfer  Gait: 10' x 1 without AD, 500' x 1 with RW with min A for LOB on 2 occasions       AM-PAC 6 CLICK MOBILITY  Turning over in bed (including adjusting bedclothes, sheets and blankets)?: 3  Sitting down on and standing up from a chair with arms (e.g., wheelchair, bedside commode, etc.): 3  Moving from lying on back to sitting on the side of the bed?: 3  Moving to and from a bed to a chair (including a  wheelchair)?: 3  Need to walk in hospital room?: 3  Climbing 3-5 steps with a railing?: 3 (est.)  Basic Mobility Total Score: 18       Treatment & Education:  Mobility training as above with cues for technique  STANDING: with RW: mini squats x 10 reps, 1/2 squats x 10 reps  SEATED: LAQ, ankle DF/PF, x 10 reps x 2 sets    Patient left sitting edge of bed with all lines intact, call button in reach, and CNA present.    GOALS:   Multidisciplinary Problems       Physical Therapy Goals          Problem: Physical Therapy    Goal Priority Disciplines Outcome Goal Variances Interventions   Physical Therapy Goal     PT, PT/OT Ongoing, Progressing     Description: Goals to be met by: 23     Patient will increase functional independence with mobility by performin. Supine to sit with Stand-by Assistance  2. Sit to supine with Stand-by Assistance  3. Sit to stand transfer with Contact Guard Assistance  4. Bed to chair transfer with Contact Guard Assistance using Rolling Walker  5. Gait  x 25 feet with Contact Guard Assistance using Rolling Walker.                          Time Tracking:     PT Received On: 12/15/23  PT Start Time: 922     PT Stop Time: 947  PT Total Time (min): 25 min     Billable Minutes: Gait Training 13 and Therapeutic Exercise 12    Treatment Type: Treatment  PT/PTA: PT     Number of PTA visits since last PT visit: 0     12/15/2023

## 2023-12-16 LAB
ALBUMIN SERPL BCP-MCNC: 2.5 G/DL (ref 3.5–5.2)
ALP SERPL-CCNC: 747 U/L (ref 55–135)
ALT SERPL W/O P-5'-P-CCNC: 46 U/L (ref 10–44)
ANION GAP SERPL CALC-SCNC: 12 MMOL/L (ref 8–16)
AST SERPL-CCNC: 73 U/L (ref 10–40)
BASOPHILS # BLD AUTO: 0.04 K/UL (ref 0–0.2)
BASOPHILS NFR BLD: 1 % (ref 0–1.9)
BILIRUB SERPL-MCNC: 1.2 MG/DL (ref 0.1–1)
BUN SERPL-MCNC: 56 MG/DL (ref 6–20)
CALCIUM SERPL-MCNC: 9.3 MG/DL (ref 8.7–10.5)
CHLORIDE SERPL-SCNC: 98 MMOL/L (ref 95–110)
CO2 SERPL-SCNC: 21 MMOL/L (ref 23–29)
CREAT SERPL-MCNC: 9.4 MG/DL (ref 0.5–1.4)
DIFFERENTIAL METHOD BLD: ABNORMAL
EOSINOPHIL # BLD AUTO: 0.1 K/UL (ref 0–0.5)
EOSINOPHIL NFR BLD: 3.4 % (ref 0–8)
ERYTHROCYTE [DISTWIDTH] IN BLOOD BY AUTOMATED COUNT: 15.9 % (ref 11.5–14.5)
EST. GFR  (NO RACE VARIABLE): 6 ML/MIN/1.73 M^2
GLUCOSE SERPL-MCNC: 94 MG/DL (ref 70–110)
HCT VFR BLD AUTO: 33.3 % (ref 40–54)
HGB BLD-MCNC: 10.8 G/DL (ref 14–18)
IMM GRANULOCYTES # BLD AUTO: 0.01 K/UL (ref 0–0.04)
IMM GRANULOCYTES NFR BLD AUTO: 0.2 % (ref 0–0.5)
INTERPRETATION UR IFE-IMP: NORMAL
LYMPHOCYTES # BLD AUTO: 0.7 K/UL (ref 1–4.8)
LYMPHOCYTES NFR BLD: 16.5 % (ref 18–48)
MAGNESIUM SERPL-MCNC: 2.3 MG/DL (ref 1.6–2.6)
MCH RBC QN AUTO: 28.9 PG (ref 27–31)
MCHC RBC AUTO-ENTMCNC: 32.4 G/DL (ref 32–36)
MCV RBC AUTO: 89 FL (ref 82–98)
MONOCYTES # BLD AUTO: 0.6 K/UL (ref 0.3–1)
MONOCYTES NFR BLD: 15.5 % (ref 4–15)
NEUTROPHILS # BLD AUTO: 2.6 K/UL (ref 1.8–7.7)
NEUTROPHILS NFR BLD: 63.4 % (ref 38–73)
NRBC BLD-RTO: 0 /100 WBC
PLATELET # BLD AUTO: 118 K/UL (ref 150–450)
PMV BLD AUTO: 9.7 FL (ref 9.2–12.9)
POCT GLUCOSE: 119 MG/DL (ref 70–110)
POCT GLUCOSE: 71 MG/DL (ref 70–110)
POCT GLUCOSE: 87 MG/DL (ref 70–110)
POTASSIUM SERPL-SCNC: 5 MMOL/L (ref 3.5–5.1)
PROT SERPL-MCNC: 6.7 G/DL (ref 6–8.4)
RBC # BLD AUTO: 3.74 M/UL (ref 4.6–6.2)
SODIUM SERPL-SCNC: 131 MMOL/L (ref 136–145)
WBC # BLD AUTO: 4.12 K/UL (ref 3.9–12.7)

## 2023-12-16 PROCEDURE — 36415 COLL VENOUS BLD VENIPUNCTURE: CPT

## 2023-12-16 PROCEDURE — 80053 COMPREHEN METABOLIC PANEL: CPT

## 2023-12-16 PROCEDURE — A4216 STERILE WATER/SALINE, 10 ML: HCPCS

## 2023-12-16 PROCEDURE — 63600175 PHARM REV CODE 636 W HCPCS: Performed by: INTERNAL MEDICINE

## 2023-12-16 PROCEDURE — 85025 COMPLETE CBC W/AUTO DIFF WBC: CPT

## 2023-12-16 PROCEDURE — 11000001 HC ACUTE MED/SURG PRIVATE ROOM

## 2023-12-16 PROCEDURE — 99900035 HC TECH TIME PER 15 MIN (STAT)

## 2023-12-16 PROCEDURE — 80100014 HC HEMODIALYSIS 1:1

## 2023-12-16 PROCEDURE — 83735 ASSAY OF MAGNESIUM: CPT

## 2023-12-16 PROCEDURE — 25000003 PHARM REV CODE 250

## 2023-12-16 PROCEDURE — 25000003 PHARM REV CODE 250: Performed by: INTERNAL MEDICINE

## 2023-12-16 PROCEDURE — 94761 N-INVAS EAR/PLS OXIMETRY MLT: CPT

## 2023-12-16 PROCEDURE — 63600175 PHARM REV CODE 636 W HCPCS

## 2023-12-16 RX ADMIN — PANTOPRAZOLE SODIUM 40 MG: 40 TABLET, DELAYED RELEASE ORAL at 08:12

## 2023-12-16 RX ADMIN — ONDANSETRON 4 MG: 2 INJECTION INTRAMUSCULAR; INTRAVENOUS at 09:12

## 2023-12-16 RX ADMIN — HEPARIN SODIUM 5000 UNITS: 5000 INJECTION, SOLUTION INTRAVENOUS; SUBCUTANEOUS at 03:12

## 2023-12-16 RX ADMIN — HEPARIN SODIUM 4000 UNITS: 1000 INJECTION, SOLUTION INTRAVENOUS; SUBCUTANEOUS at 10:12

## 2023-12-16 RX ADMIN — Medication 10 ML: at 09:12

## 2023-12-16 RX ADMIN — HYDRALAZINE HYDROCHLORIDE 100 MG: 25 TABLET, FILM COATED ORAL at 09:12

## 2023-12-16 RX ADMIN — HEPARIN SODIUM 5000 UNITS: 5000 INJECTION, SOLUTION INTRAVENOUS; SUBCUTANEOUS at 09:12

## 2023-12-16 RX ADMIN — SEVELAMER CARBONATE 800 MG: 800 TABLET, FILM COATED ORAL at 08:12

## 2023-12-16 RX ADMIN — HEPARIN SODIUM 5000 UNITS: 5000 INJECTION, SOLUTION INTRAVENOUS; SUBCUTANEOUS at 05:12

## 2023-12-16 RX ADMIN — SEVELAMER CARBONATE 800 MG: 800 TABLET, FILM COATED ORAL at 05:12

## 2023-12-16 RX ADMIN — NIFEDIPINE 90 MG: 30 TABLET, FILM COATED, EXTENDED RELEASE ORAL at 12:12

## 2023-12-16 RX ADMIN — HYDRALAZINE HYDROCHLORIDE 100 MG: 25 TABLET, FILM COATED ORAL at 03:12

## 2023-12-16 RX ADMIN — CHLORPROMAZINE HYDROCHLORIDE 25 MG: 25 TABLET, SUGAR COATED ORAL at 10:12

## 2023-12-16 RX ADMIN — SEVELAMER CARBONATE 800 MG: 800 TABLET, FILM COATED ORAL at 12:12

## 2023-12-16 RX ADMIN — AMITRIPTYLINE HYDROCHLORIDE 10 MG: 10 TABLET, FILM COATED ORAL at 09:12

## 2023-12-16 RX ADMIN — MUPIROCIN: 20 OINTMENT TOPICAL at 09:12

## 2023-12-16 RX ADMIN — MUPIROCIN: 20 OINTMENT TOPICAL at 12:12

## 2023-12-16 RX ADMIN — ALUMINUM HYDROXIDE, MAGNESIUM HYDROXIDE, AND SIMETHICONE 30 ML: 200; 200; 20 SUSPENSION ORAL at 03:12

## 2023-12-16 RX ADMIN — Medication 10 ML: at 06:12

## 2023-12-16 NOTE — ASSESSMENT & PLAN NOTE
Resolved - controlled with BP meds and dialysis  Patient has a current diagnosis of Hypertensive emergency with end organ damage evidenced by acute kidney injury which is uncontrolled.  Latest blood pressure and vitals reviewed-   Temp:  [97.6 °F (36.4 °C)-98.5 °F (36.9 °C)]   Pulse:  [82-95]   Resp:  [16-19]   BP: (120-178)/(76-93)   SpO2:  [93 %-100 %] .   Patient currently on IV antihypertensives.   Home meds for hypertension were reviewed and noted below.   Hypertension Medications               hydrALAZINE (APRESOLINE) 100 MG tablet Take 1 tablet (100 mg total) by mouth 3 (three) times daily.    NIFEdipine (PROCARDIA XL) 90 MG (OSM) 24 hr tablet Take 1 tablet (90 mg total) by mouth once daily.    torsemide (DEMADEX) 20 MG Tab Take 1 tablet by mouth once daily.              Will aim for controlled BP reduction by medications noted above. Monitor and mitigate end organ damage as indicated.  BP improved with re-introducing home meds  Follow BP trends and adjust as warranted

## 2023-12-16 NOTE — PT/OT/SLP PROGRESS
Physical Therapy      Patient Name:  Catalino Mcfadden   MRN:  [id number]    Patient not seen today secondary to Dialysis. Will follow-up 12/17/23.

## 2023-12-16 NOTE — PROGRESS NOTES
2553 ml net uf removed, System clotted with 30 min left in treatment, Dr Waters notified , stated it was ok to stop tx   12/16/23 1055   Required for all Hemodialysis Patients   Hepatitis Status negative   Handoff Report   Received From Arely   Given To David   Treatment Type   Treatment Type Maintenance   Vital Signs   Temp 97 °F (36.1 °C)   Pulse 90   Resp 18   /80   Assessments (Pre/Post)   Safety vein preservation armband present   Date Hepatitis Profile Obtained 12/05/23   Blood Liters Processed (BLP) 45   Transport Modality ambulatory   Level of Consciousness (AVPU) alert   Dialyzer Clearance moderately streaked   Pain   Preferred Pain Scale number (Numeric Rating Pain Scale)   Pain Rating (0-10): Rest 0        Hemodialysis Catheter right subclavian   No placement date or time found.   Present Prior to Hospital Arrival?: Yes  Hemodialysis Catheter Type: Tunneled catheter  Location: right subclavian  Placement Verification: Blood return   Line Necessity Review CRRT/HD   Site Assessment No drainage;No redness;No swelling;No warmth   Line Securement Device Secured with sutures   Dressing Type Central line dressing   Dressing Status Clean;Dry;Intact   Dressing Intervention Integrity maintained   Date on Dressing 12/12/23   Dressing Due to be Changed 12/18/23   Venous Patency/Care flushed w/o difficulty;heparin locked;deaccessed   Arterial Patency/Care flushed w/o difficulty;heparin locked;deaccessed   Post-Hemodialysis Assessment   Rinseback Volume (mL) 250 mL   Blood Volume Processed (Liters) 45 L   Dialyzer Clearance Moderately streaked   Duration of Treatment 150 minutes   Additional Fluid Intake (mL) 500 mL   Total UF (mL) 3053 mL   Net Fluid Removal 2553   Patient Response to Treatment tolerated well   Post-Treatment Weight 88.5 kg (195 lb 1.7 oz)   Treatment Weight Change -2.5   Post-Hemodialysis Comments tx completed   Edema   Edema generalized     Educated on hd tx

## 2023-12-16 NOTE — CARE UPDATE
12/16/23 0746   Patient Assessment/Suction   Level of Consciousness (AVPU) alert   Respiratory Effort Unlabored   Expansion/Accessory Muscles/Retractions no retractions;expansion symmetric;no use of accessory muscles   All Lung Fields Breath Sounds Anterior:;Lateral:;clear   Rhythm/Pattern, Respiratory unlabored;pattern regular;depth regular;no shortness of breath reported   PRE-TX-O2   Device (Oxygen Therapy) room air   SpO2 97 %   Pulse Oximetry Type Intermittent   $ Pulse Oximetry - Multiple Charge Pulse Oximetry - Multiple   Pulse 83   Resp 16   Aerosol Therapy   $ Aerosol Therapy Charges PRN treatment not required   Respiratory Treatment Status (SVN) PRN treatment not required

## 2023-12-16 NOTE — ASSESSMENT & PLAN NOTE
Creatine stable for now. BMP reviewed - noted Estimated Creatinine Clearance: 9.6 mL/min (A) (based on SCr of 9.4 mg/dL (H)). according to latest data. Based on current GFR, CKD stage is end stage.  Monitor UOP and serial BMP and adjust therapy as needed. Renally dose meds. Avoid nephrotoxic medications and procedures.  -AV fistula noted without complications -  monitoring  -dialysis ongoing  -nephrology following most recent dialysis 12/15/23

## 2023-12-16 NOTE — PROGRESS NOTES
INPATIENT NEPHROLOGY Progress Note   [facility]    Patient Name: Catalino Mcfadden  Date: 12/16/2023    Reason for consultation: ESRD    Chief Complaint:   Chief Complaint   Patient presents with    Dizziness    Vomiting    Fall     For several days        History of Present Illness:  Catalino Mcfadden is a 59 y/o M with past medical history significant for HTN, ESRD on HD, DM2, gastroparesis, diabetic foot ulcer, chronic hiccups, and HLD who presented to the ED for with episodic dizziness, vomiting, and lower extremity weakness following a fall x 1 day. He reports nausea has been resolved with Zofran. He denies chest pain, shortness of breath, fever, chills, abdominal pain, headaches, light-headedness, numbness, tingling, or LOC. He reports he missed his Saturday hemodialysis treatment but states last week he received HD Tues, Wed, Thurs, during hospitaliztion. He was recently discharged 12/07. In the ED blood pressure 200s/100s, received IV and PO antihypertensive medication with mild improvemnt. He states he did not take his medication yesterday due to N/V. CT Head negative. MRI brain neg. Consulted for dialysis.    Interval History:  12/12- BP better, on RA, no complaints  12/13- MRI L spine abnormal- getting w/u for malignancy vs paraprotein- neuro, neurosurg and heme/onc consulted  12/14 HD today, BP is high since admission- he says his BP isn't normally high  12/15 BP trends are better- trend again today- may adjust nifedipine tomorrow if BP goes up again  12/16 VSS. Tolerated HD well today, but clotted 30 min before end of therapy. OK to dc when ready.    Plan of Care:    ESRD on HD TTS  HTN  Hyponatremia  Hyperkalemia  Secondary HPT  Anemia of CKD    Plan:    - continue HD TTS  - continue home BP meds- push UF 3-4L- if BP still high post HD will add BP medication  - adjust dialysate- renal diet, 1.5L fluid restriction  - continue binder with meals  - no acute CLEMENT needs    Thank you for allowing us  to participate in this patient's care. We will continue to follow.    Vital Signs:  Temp Readings from Last 3 Encounters:   12/16/23 97.6 °F (36.4 °C)   12/07/23 98.3 °F (36.8 °C)   07/06/23 98.4 °F (36.9 °C) (Oral)       Pulse Readings from Last 3 Encounters:   12/16/23 90   12/07/23 99   12/01/23 95       BP Readings from Last 3 Encounters:   12/16/23 120/77   12/07/23 (!) 185/89   12/01/23 (!) 153/76       Weight:  Wt Readings from Last 3 Encounters:   12/14/23 90.8 kg (200 lb 2.8 oz)   12/05/23 102.1 kg (225 lb)   12/01/23 96.1 kg (211 lb 13.8 oz)       Medications:  Scheduled Meds:   amitriptyline  10 mg Oral QHS    heparin (porcine)  5,000 Units Subcutaneous Q8H    hydrALAZINE  100 mg Oral TID    mupirocin   Nasal BID    NIFEdipine  90 mg Oral Daily    pantoprazole  40 mg Oral Daily    sevelamer carbonate  800 mg Oral TID WM    sodium chloride 0.9%  10 mL Intravenous Q8H     Continuous Infusions:  PRN Meds:.sodium chloride 0.9%, acetaminophen, albuterol-ipratropium, aluminum-magnesium hydroxide-simethicone, chlorproMAZINE, glucagon (human recombinant), glucose, glucose, heparin (porcine), insulin aspart U-100, melatonin, naloxone, ondansetron, prochlorperazine, senna-docusate 8.6-50 mg, sodium chloride 0.9%  No current facility-administered medications on file prior to encounter.     Current Outpatient Medications on File Prior to Encounter   Medication Sig Dispense Refill    hydrALAZINE (APRESOLINE) 100 MG tablet Take 1 tablet (100 mg total) by mouth 3 (three) times daily. 90 tablet 0    NIFEdipine (PROCARDIA XL) 90 MG (OSM) 24 hr tablet Take 1 tablet (90 mg total) by mouth once daily. 90 tablet 3    promethazine (PHENERGAN) 12.5 MG Tab Take 1 tablet (12.5 mg total) by mouth every 6 (six) hours as needed (nausea and vomiting). 30 tablet 3    amitriptyline (ELAVIL) 10 MG tablet Take 1 tablet (10 mg total) by mouth every evening. (Patient not taking: Reported on 12/12/2023) 30 tablet 0    GAVILYTE-C  240-22.72-6.72 -5.84 gram SolR Take 4,000 mLs by mouth once.      heparin sod,pork in 0.45% NaCl (HEPARIN, PORCINE, 100 UNITS) 100 unit/100 mL (1 unit/mL) SolP in sodium chloride 0.45 % 100 mL infusion Inject 1 mL/hr into the vein continuous.      methoxy peg-epoetin beta (MIRCERA INJ) Inject 50 mcg into the skin every 28 days.      sevelamer carbonate (RENVELA) 800 mg Tab Take 1 tablet (800 mg total) by mouth 3 (three) times daily with meals. (Patient not taking: Reported on 12/12/2023) 90 tablet 11    sucroferric oxyhydroxide (VELPHORO) 500 mg Chew Take 1 tablet by mouth once daily.      torsemide (DEMADEX) 20 MG Tab Take 1 tablet by mouth once daily.       Review of Systems:  Neg    Physical Exam:  General Appearance:    NAD, AAO x 3, cooperative, appears stated age   Head:    Normocephalic, atraumatic   Eyes:    PER, EOMI, and conjunctiva/sclera clear bilaterally       Mouth:   Moist mucus membranes, no thrush or oral lesions,       normal dentition   Back:     No CVA tenderness   Lungs:     Clear to auscultation bilaterally, no wheezes, crackles,           rales or rhonchi, symmetric air movement, respirations unlabored   Chest wall:    No tenderness or deformity   Heart:    Regular rate and rhythm, S1 and S2 normal, no murmur, rub   or gallop   Abdomen:     Soft, non-tender, non-distended, bowel sounds active all four   quadrants, no RT or guarding, no masses, no organomegaly   Extremities:   Warm and well perfused, distal pulses are intact, no             cyanosis or peripheral edema   MSK:   No joint or muscle swelling, tenderness or deformity   Skin:   Skin color, texture, turgor normal, no rashes or lesions   Neurologic/Psychiatric:   CNII-XII intact, normal strength and sensation       throughout, no asterixis; normal affect, memory, judgement     and insight      Results:  Lab Results   Component Value Date     (L) 12/16/2023    K 5.0 12/16/2023    CL 98 12/16/2023    CO2 21 (L) 12/16/2023    BUN  56 (H) 12/16/2023    CREATININE 9.4 (H) 12/16/2023    CALCIUM 9.3 12/16/2023    ANIONGAP 12 12/16/2023    ESTGFRAFRICA 10.4 (A) 02/23/2022    EGFRNONAA 9.0 (A) 02/23/2022       Lab Results   Component Value Date    CALCIUM 9.3 12/16/2023    PHOS 2.9 12/07/2023       Recent Labs   Lab 12/16/23  0432   WBC 4.12   RBC 3.74*   HGB 10.8*   HCT 33.3*   *   MCV 89   MCH 28.9   MCHC 32.4         I have personally reviewed pertinent radiological imaging and reports.    I have spent >  minutes providing care for this patient for the above diagnoses. These services have included chart/data/imaging review, evaluation, exam, formulation of plan, , note preparation, and discussions with staff involved in this patient's care.    Juan Waters MD  [facility] [address] [address]  [phone] (p)  [phone] (f)

## 2023-12-16 NOTE — ASSESSMENT & PLAN NOTE
Concerning MRI spine for metas tic disease  Ongoing workup Onclology, Neurology, and Nephrology following  Neurosurgery consulted and following paraesthesias   Neurology following - workup ongoing. Nephrology following - HD. Oncology  Per Hem/onc Skeletal survey did not show any lytic lesions. He will follow up in clinic as he will likely need a biopsy of spinal lesions as outpatient - awaiting SPEP -   Palliative care consult code status remains full code - anticipate   PT/OT following anticipate rehab discharge on Monday

## 2023-12-16 NOTE — PROGRESS NOTES
"[facility] Medicine  Progress Note    Patient Name: Catalino Mcfadden  MRN: [id number]  Patient Class: IP- Inpatient   Admission Date: 12/11/2023  Length of Stay: 3 days  Attending Physician: Roger Joshua MD  Primary Care Provider: Marty Rodríguez PA-C        Subjective:     Principal Problem:Abnormal MRI, lumbar spine        HPI:  Catalino Mcfadden is a 59 y/o M with past medical history significant for HTN, ESRD on HD, DM2, gastroparesis, diabetic foot ulcer, chronic hiccups, and HLD who presented to the ED for with episodic dizziness, vomiting, and lower extremity weakness following a fall x 1 day. He reports nausea has been resolved with Zofran. He denies chest pain, shortness of breath, fever, chills, abdominal pain, headaches, light-headedness, numbness, tingling, or LOC. He reports he missed his Saturday hemodialysis treatment but states last week he received HD Tues, Wed, Thurs, during hospitaliztion. He was recently discharged 12/07. Lab work reflective of ESRD with elevated bili, ALP, and AST. In the ED blood pressure 200s/100s, received IV and PO antihypertensive medication with mild improvemnt. He states he did not take his medication yesterday due to N/V. During examination patient states he is unable to lift his lower extremities, however, was able to walk into the ED with assistance from a friend. CT Head negative. Admitted to hospital medicine for hypertensive emergency evaluation and treatment with nephrology and neurology consults placed.                     Overview/Hospital Course:  Patient presented with hypertensive emergency not taking medication for blood pressure.  He reports nausea which has resolved in early morning he was able to tolerate breakfast.  Patient had also reported a fall at home a few days prior to presentation.  MRI lumbar spine obtained that showed "1. Multiple scattered osseous lesions throughout the visualized lumbosacral spine and iliac bones, " "nonspecific, but given findings on comparison chest CT from 03/22/2023 are concerning for metastatic disease.  Further evaluation recommended.   2. Mild multilevel degenerative change of the lumbar spine, as detailed above, most pronounced at L3-4 through L5-S1 and resulting in mild neural foraminal stenosis.  No significant spinal canal stenosis at any lumbar level"     Neurology following - workup ongoing. Nephrology following - HD. Oncology - workup ongoing. Skeletal survey did not show any lytic lesions. He will follow up in clinic as he will likely need a biopsy of spinal lesions as outpatient - awaiting SPEP - Palliative care consult code status remains full code - anticipate Rehab discharge on Monday.    Interval History: see HPI    Review of Systems   Constitutional:  Negative for appetite change, chills, diaphoresis and fever.   HENT:  Negative for congestion, hearing loss, sore throat, tinnitus and trouble swallowing.    Eyes:  Negative for photophobia, discharge, itching and visual disturbance.   Respiratory:  Negative for apnea, cough, wheezing and stridor.    Cardiovascular:  Negative for chest pain, palpitations and leg swelling.   Gastrointestinal:  Negative for abdominal distention, abdominal pain, blood in stool, constipation, diarrhea and nausea.   Endocrine: Negative for polydipsia, polyphagia and polyuria.   Genitourinary:  Negative for difficulty urinating, dysuria, flank pain and frequency.   Musculoskeletal:  Positive for arthralgias and gait problem. Negative for joint swelling and neck stiffness.   Skin:  Negative for color change, rash and wound.   Neurological:  Negative for dizziness, tremors, seizures, light-headedness, numbness and headaches.   Hematological:  Negative for adenopathy.   Psychiatric/Behavioral:  Negative for hallucinations and self-injury.      Objective:     Vital Signs (Most Recent):  Temp: 97.6 °F (36.4 °C) (12/16/23 0805)  Pulse: 90 (12/16/23 1000)  Resp: 18 " (12/16/23 0805)  BP: 120/77 (12/16/23 1000)  SpO2: 97 % (12/16/23 0746) Vital Signs (24h Range):  Temp:  [97.6 °F (36.4 °C)-98.5 °F (36.9 °C)] 97.6 °F (36.4 °C)  Pulse:  [82-95] 90  Resp:  [16-19] 18  SpO2:  [93 %-100 %] 97 %  BP: (120-178)/(76-93) 120/77     Weight: 90.8 kg (200 lb 2.8 oz)  Body mass index is 26.41 kg/m².    Intake/Output Summary (Last 24 hours) at 12/16/2023 1056  Last data filed at 12/16/2023 0800  Gross per 24 hour   Intake 720 ml   Output --   Net 720 ml         Physical Exam  HENT:      Head: Normocephalic and atraumatic.      Mouth/Throat:      Mouth: Mucous membranes are dry.   Eyes:      Pupils: Pupils are equal, round, and reactive to light.   Cardiovascular:      Rate and Rhythm: Normal rate and regular rhythm.   Musculoskeletal:      Cervical back: Normal range of motion.      Comments: Impaired mobility - pt following - safety precautions   Skin:     General: Skin is warm and dry.   Neurological:      Mental Status: He is alert and oriented to person, place, and time.             Significant Labs: All pertinent labs within the past 24 hours have been reviewed.    Significant Imaging: I have reviewed all pertinent imaging results/findings within the past 24 hours.    Assessment/Plan:      * Abnormal MRI, lumbar spine  Concerning MRI spine for metas tic disease  Ongoing workup Onclology, Neurology, and Nephrology following  Neurosurgery consulted and following paraesthesias   Neurology following - workup ongoing. Nephrology following - HD. Oncology  Per Hem/onc Skeletal survey did not show any lytic lesions. He will follow up in clinic as he will likely need a biopsy of spinal lesions as outpatient - awaiting SPEP -   Palliative care consult code status remains full code - anticipate   PT/OT following anticipate rehab discharge on Monday      Hypertensive emergency  Resolved - controlled with BP meds and dialysis  Patient has a current diagnosis of Hypertensive emergency with end organ  damage evidenced by acute kidney injury which is uncontrolled.  Latest blood pressure and vitals reviewed-   Temp:  [97.6 °F (36.4 °C)-98.5 °F (36.9 °C)]   Pulse:  [82-95]   Resp:  [16-19]   BP: (120-178)/(76-93)   SpO2:  [93 %-100 %] .   Patient currently on IV antihypertensives.   Home meds for hypertension were reviewed and noted below.   Hypertension Medications               hydrALAZINE (APRESOLINE) 100 MG tablet Take 1 tablet (100 mg total) by mouth 3 (three) times daily.    NIFEdipine (PROCARDIA XL) 90 MG (OSM) 24 hr tablet Take 1 tablet (90 mg total) by mouth once daily.    torsemide (DEMADEX) 20 MG Tab Take 1 tablet by mouth once daily.              Will aim for controlled BP reduction by medications noted above. Monitor and mitigate end organ damage as indicated.  BP improved with re-introducing home meds  Follow BP trends and adjust as warranted    ESRD on hemodialysis  Creatine stable for now. BMP reviewed - noted Estimated Creatinine Clearance: 9.6 mL/min (A) (based on SCr of 9.4 mg/dL (H)). according to latest data. Based on current GFR, CKD stage is end stage.  Monitor UOP and serial BMP and adjust therapy as needed. Renally dose meds. Avoid nephrotoxic medications and procedures.  -AV fistula noted without complications -  monitoring  -dialysis ongoing  -nephrology following most recent dialysis 12/15/23    Lower extremity weakness  Possibly due to hypertensive emergency; patient states unable to lift legs against gravity  S/p fall at home due to bilateral lower extremity weakness  CT head negative  Neurology consult placed      Serum total bilirubin elevated  Chronic condition noted  Monitor bmp    Intractable hiccups  Chronic condition  Continue prn thorazine       Type 2 diabetes mellitus with stage 5 chronic kidney disease  Patient's FSGs are controlled on current medication regimen.  Last A1c reviewed-   Lab Results   Component Value Date    HGBA1C 5.1 02/22/2023     Most recent fingerstick  "glucose reviewed- No results for input(s): "POCTGLUCOSE" in the last 24 hours.  Current correctional scale  Medium  Maintain anti-hyperglycemic dose as follows-   Antihyperglycemics (From admission, onward)      Start     Stop Route Frequency Ordered    12/12/23 0054  insulin aspart U-100 pen 0-10 Units         -- SubQ Before meals & nightly PRN 12/12/23 0054          Hold Oral hypoglycemics while patient is in the hospital.      VTE Risk Mitigation (From admission, onward)           Ordered     heparin (porcine) injection 4,000 Units  As needed (PRN)         12/14/23 0837     heparin (porcine) injection 5,000 Units  Every 8 hours         12/12/23 0054     IP VTE HIGH RISK PATIENT  Once         12/12/23 0054     Place sequential compression device  Until discontinued         12/12/23 0054                    Discharge Planning   WILMER: 12/18/2023     Code Status: Full Code   Is the patient medically ready for discharge?:     Reason for patient still in hospital (select all that apply): PT / OT recommendations and Pending disposition  Discharge Plan A: Rehab   Discharge Delays: None known at this time        Belén Christine, DNP, APRN, FNP-C  [facility]  [facility]  [email or web address]    "

## 2023-12-16 NOTE — SUBJECTIVE & OBJECTIVE
Interval History: see HPI    Review of Systems   Constitutional:  Negative for appetite change, chills, diaphoresis and fever.   HENT:  Negative for congestion, hearing loss, sore throat, tinnitus and trouble swallowing.    Eyes:  Negative for photophobia, discharge, itching and visual disturbance.   Respiratory:  Negative for apnea, cough, wheezing and stridor.    Cardiovascular:  Negative for chest pain, palpitations and leg swelling.   Gastrointestinal:  Negative for abdominal distention, abdominal pain, blood in stool, constipation, diarrhea and nausea.   Endocrine: Negative for polydipsia, polyphagia and polyuria.   Genitourinary:  Negative for difficulty urinating, dysuria, flank pain and frequency.   Musculoskeletal:  Positive for arthralgias and gait problem. Negative for joint swelling and neck stiffness.   Skin:  Negative for color change, rash and wound.   Neurological:  Negative for dizziness, tremors, seizures, light-headedness, numbness and headaches.   Hematological:  Negative for adenopathy.   Psychiatric/Behavioral:  Negative for hallucinations and self-injury.      Objective:     Vital Signs (Most Recent):  Temp: 97.6 °F (36.4 °C) (12/16/23 0805)  Pulse: 90 (12/16/23 1000)  Resp: 18 (12/16/23 0805)  BP: 120/77 (12/16/23 1000)  SpO2: 97 % (12/16/23 0746) Vital Signs (24h Range):  Temp:  [97.6 °F (36.4 °C)-98.5 °F (36.9 °C)] 97.6 °F (36.4 °C)  Pulse:  [82-95] 90  Resp:  [16-19] 18  SpO2:  [93 %-100 %] 97 %  BP: (120-178)/(76-93) 120/77     Weight: 90.8 kg (200 lb 2.8 oz)  Body mass index is 26.41 kg/m².    Intake/Output Summary (Last 24 hours) at 12/16/2023 1056  Last data filed at 12/16/2023 0800  Gross per 24 hour   Intake 720 ml   Output --   Net 720 ml         Physical Exam  HENT:      Head: Normocephalic and atraumatic.      Mouth/Throat:      Mouth: Mucous membranes are dry.   Eyes:      Pupils: Pupils are equal, round, and reactive to light.   Cardiovascular:      Rate and Rhythm: Normal rate  and regular rhythm.   Musculoskeletal:      Cervical back: Normal range of motion.      Comments: Impaired mobility - pt following - safety precautions   Skin:     General: Skin is warm and dry.   Neurological:      Mental Status: He is alert and oriented to person, place, and time.             Significant Labs: All pertinent labs within the past 24 hours have been reviewed.    Significant Imaging: I have reviewed all pertinent imaging results/findings within the past 24 hours.

## 2023-12-16 NOTE — PLAN OF CARE
Problem: Adult Inpatient Plan of Care  Goal: Plan of Care Review  Outcome: Ongoing, Progressing  Goal: Patient-Specific Goal (Individualized)  Outcome: Ongoing, Progressing     Problem: Diabetes Comorbidity  Goal: Blood Glucose Level Within Targeted Range  Outcome: Ongoing, Progressing     Problem: Infection  Goal: Absence of Infection Signs and Symptoms  Outcome: Ongoing, Progressing     Problem: Skin Injury Risk Increased  Goal: Skin Health and Integrity  Outcome: Ongoing, Progressing

## 2023-12-17 PROBLEM — R53.81 DEBILITY: Status: ACTIVE | Noted: 2023-12-17

## 2023-12-17 LAB
POCT GLUCOSE: 103 MG/DL (ref 70–110)
POCT GLUCOSE: 82 MG/DL (ref 70–110)
POCT GLUCOSE: 85 MG/DL (ref 70–110)
POCT GLUCOSE: 89 MG/DL (ref 70–110)

## 2023-12-17 PROCEDURE — 99900035 HC TECH TIME PER 15 MIN (STAT)

## 2023-12-17 PROCEDURE — 25000003 PHARM REV CODE 250: Performed by: HOSPITALIST

## 2023-12-17 PROCEDURE — 97110 THERAPEUTIC EXERCISES: CPT | Mod: CQ

## 2023-12-17 PROCEDURE — 97116 GAIT TRAINING THERAPY: CPT | Mod: CQ

## 2023-12-17 PROCEDURE — 63600175 PHARM REV CODE 636 W HCPCS

## 2023-12-17 PROCEDURE — 97110 THERAPEUTIC EXERCISES: CPT

## 2023-12-17 PROCEDURE — 25000003 PHARM REV CODE 250

## 2023-12-17 PROCEDURE — 25000003 PHARM REV CODE 250: Performed by: INTERNAL MEDICINE

## 2023-12-17 PROCEDURE — 94761 N-INVAS EAR/PLS OXIMETRY MLT: CPT

## 2023-12-17 PROCEDURE — 11000001 HC ACUTE MED/SURG PRIVATE ROOM

## 2023-12-17 PROCEDURE — A4216 STERILE WATER/SALINE, 10 ML: HCPCS

## 2023-12-17 RX ADMIN — MUPIROCIN: 20 OINTMENT TOPICAL at 08:12

## 2023-12-17 RX ADMIN — SEVELAMER CARBONATE 800 MG: 800 TABLET, FILM COATED ORAL at 05:12

## 2023-12-17 RX ADMIN — SEVELAMER CARBONATE 800 MG: 800 TABLET, FILM COATED ORAL at 08:12

## 2023-12-17 RX ADMIN — HEPARIN SODIUM 5000 UNITS: 5000 INJECTION, SOLUTION INTRAVENOUS; SUBCUTANEOUS at 03:12

## 2023-12-17 RX ADMIN — Medication 10 ML: at 09:12

## 2023-12-17 RX ADMIN — SEVELAMER CARBONATE 800 MG: 800 TABLET, FILM COATED ORAL at 11:12

## 2023-12-17 RX ADMIN — Medication 10 ML: at 05:12

## 2023-12-17 RX ADMIN — CHLORPROMAZINE HYDROCHLORIDE 25 MG: 25 TABLET, SUGAR COATED ORAL at 05:12

## 2023-12-17 RX ADMIN — ONDANSETRON 4 MG: 2 INJECTION INTRAMUSCULAR; INTRAVENOUS at 05:12

## 2023-12-17 RX ADMIN — ONDANSETRON 4 MG: 2 INJECTION INTRAMUSCULAR; INTRAVENOUS at 09:12

## 2023-12-17 RX ADMIN — HEPARIN SODIUM 5000 UNITS: 5000 INJECTION, SOLUTION INTRAVENOUS; SUBCUTANEOUS at 11:12

## 2023-12-17 RX ADMIN — NIFEDIPINE 90 MG: 30 TABLET, FILM COATED, EXTENDED RELEASE ORAL at 08:12

## 2023-12-17 RX ADMIN — AMITRIPTYLINE HYDROCHLORIDE 10 MG: 10 TABLET, FILM COATED ORAL at 09:12

## 2023-12-17 RX ADMIN — HEPARIN SODIUM 5000 UNITS: 5000 INJECTION, SOLUTION INTRAVENOUS; SUBCUTANEOUS at 05:12

## 2023-12-17 RX ADMIN — CHLORPROMAZINE HYDROCHLORIDE 25 MG: 25 TABLET, SUGAR COATED ORAL at 09:12

## 2023-12-17 RX ADMIN — HYDRALAZINE HYDROCHLORIDE 100 MG: 25 TABLET, FILM COATED ORAL at 09:12

## 2023-12-17 RX ADMIN — MUPIROCIN: 20 OINTMENT TOPICAL at 09:12

## 2023-12-17 RX ADMIN — HYDRALAZINE HYDROCHLORIDE 100 MG: 25 TABLET, FILM COATED ORAL at 08:12

## 2023-12-17 RX ADMIN — PANTOPRAZOLE SODIUM 40 MG: 40 TABLET, DELAYED RELEASE ORAL at 08:12

## 2023-12-17 RX ADMIN — Medication 10 ML: at 02:12

## 2023-12-17 RX ADMIN — HYDRALAZINE HYDROCHLORIDE 100 MG: 25 TABLET, FILM COATED ORAL at 03:12

## 2023-12-17 NOTE — PLAN OF CARE
Problem: Adult Inpatient Plan of Care  Goal: Plan of Care Review  Outcome: Ongoing, Progressing     Problem: Adult Inpatient Plan of Care  Goal: Optimal Comfort and Wellbeing  Outcome: Ongoing, Progressing     Problem: Diabetes Comorbidity  Goal: Blood Glucose Level Within Targeted Range  Outcome: Ongoing, Progressing     Problem: Hemodynamic Instability (Hemodialysis)  Goal: Effective Tissue Perfusion  Outcome: Ongoing, Progressing     Problem: Infection (Hemodialysis)  Goal: Absence of Infection Signs and Symptoms  Outcome: Ongoing, Progressing     Problem: Fall Injury Risk  Goal: Absence of Fall and Fall-Related Injury  Outcome: Ongoing, Progressing

## 2023-12-17 NOTE — CARE UPDATE
12/17/23 1337   Patient Assessment/Suction   Level of Consciousness (AVPU) alert   Respiratory Effort Unlabored   Expansion/Accessory Muscles/Retractions no use of accessory muscles;no retractions;expansion symmetric   All Lung Fields Breath Sounds clear   Rhythm/Pattern, Respiratory unlabored;no shortness of breath reported;depth regular;pattern regular   PRE-TX-O2   Device (Oxygen Therapy) room air   SpO2 95 %   Pulse Oximetry Type Intermittent   $ Pulse Oximetry - Multiple Charge Pulse Oximetry - Multiple   Pulse 68   Resp 16   Aerosol Therapy   $ Aerosol Therapy Charges PRN treatment not required   Respiratory Treatment Status (SVN) PRN treatment not required

## 2023-12-17 NOTE — PT/OT/SLP PROGRESS
Occupational Therapy   Treatment    Name: Catalino Mcfadden  MRN: [id number]  Admitting Diagnosis:  Debility     Recommendations:     Discharge Recommendations: High Intensity Therapy  Discharge Equipment Recommendations:  none, to be determined by next level of care  Barriers to discharge:     Assessment:     Catalino Mcfadden is a 58 y.o. male with a medical diagnosis of Debility.  He presents with performance deficits affecting function are weakness, impaired endurance, impaired self care skills, impaired functional mobility, gait instability, decreased upper extremity function and decreased lower extremity function.     Rehab Prognosis:  Good; patient would benefit from acute skilled OT services to address these deficits and reach maximum level of function.       Plan:     Patient to be seen 5 x/week to address the above listed problems via self-care/home management, therapeutic activities, therapeutic exercises  Plan of Care Expires: 01/09/24  Plan of Care Reviewed with: patient    Subjective     Chief Complaint: Weakness  Patient/Family Comments/goals: To get stronger  Pain/Comfort:  Pain Rating 1: 0/10    Objective:     Communicated with: nurse prior to session.  Patient found up in chair upon OT entry to room.    General Precautions: Standard, fall    Orthopedic Precautions: N/A  Braces: N/A  Respiratory Status: Room air     Occupational Performance:     Functional Mobility/Transfers:  Pt declined.    Activities of Daily Living:  Pt declined.      Treatment & Education:  Pt performed B UE resistive exercises using 4# bar x 3 sets of 10 reps shoulder press, chest press and bicep curls with extended rest breaks taken between each set.    Patient left up in chair with all lines intact, call button in reach and chair alarm on.    GOALS:   Multidisciplinary Problems       Occupational Therapy Goals          Problem: Occupational Therapy    Goal Priority Disciplines Outcome Interventions   Occupational Therapy Goal     OT, PT/OT  Ongoing, Progressing    Description: Goals to be met by: 1/9/24     Patient will increase functional independence with ADLs by performing:    Feeding with Modified [place].  UE Dressing with Modified [place].  LE Dressing with Modified [place].  Grooming while standing at sink with Modified [place].  Toileting from raised toilet with Modified [place] for hygiene and clothing management.   Bathing from  shower chair/bench with Modified [place].  Toilet transfer to raised toilet with Modified [place].  Increased strength, coordination, and functional activity tolerance for ADL's/IADL's..                         Time Tracking:     OT Date of Treatment: 12/17/23  OT Start Time: 1243  OT Stop Time: 1307  OT Total Time (min): 24 min    Billable Minutes:Therapeutic Exercise 24          Number of JOS visits since last OT visit: 0    12/17/2023

## 2023-12-17 NOTE — ASSESSMENT & PLAN NOTE
Patient with Acute debility due to other reduced mobility and multilevel DJD, spinal lesions . Latest AMPAC and GEMS scores have not been reviewed. Evaluation for etiology is underway. Plan includes progressive mobility protocol initated, PT/OT consulted, fall precautions in place, and Rehab .

## 2023-12-17 NOTE — SUBJECTIVE & OBJECTIVE
Interval History: see HPI    Review of Systems   Constitutional:  Negative for appetite change, chills, diaphoresis and fever.   HENT:  Negative for congestion, hearing loss, sore throat, tinnitus and trouble swallowing.    Eyes:  Negative for photophobia, discharge, itching and visual disturbance.   Respiratory:  Negative for apnea, cough, wheezing and stridor.    Cardiovascular:  Negative for chest pain, palpitations and leg swelling.   Gastrointestinal:  Negative for abdominal distention, abdominal pain, blood in stool, constipation, diarrhea and nausea.   Endocrine: Negative for polydipsia, polyphagia and polyuria.   Genitourinary:  Negative for difficulty urinating, dysuria, flank pain and frequency.   Musculoskeletal:  Positive for arthralgias and gait problem. Negative for joint swelling and neck stiffness.   Skin:  Negative for color change, rash and wound.   Neurological:  Negative for dizziness, tremors, seizures, light-headedness, numbness and headaches.   Hematological:  Negative for adenopathy.   Psychiatric/Behavioral:  Negative for hallucinations and self-injury.      Objective:     Vital Signs (Most Recent):  Temp: 97.5 °F (36.4 °C) (12/17/23 0733)  Pulse: 81 (12/17/23 0733)  Resp: 16 (12/17/23 0733)  BP: (!) 142/75 (12/17/23 0733)  SpO2: 98 % (12/17/23 0733) Vital Signs (24h Range):  Temp:  [97 °F (36.1 °C)-98.6 °F (37 °C)] 97.5 °F (36.4 °C)  Pulse:  [80-93] 81  Resp:  [16-18] 16  SpO2:  [98 %-100 %] 98 %  BP: (117-175)/(72-87) 142/75     Weight: 90.3 kg (199 lb 1.2 oz)  Body mass index is 26.26 kg/m².    Intake/Output Summary (Last 24 hours) at 12/17/2023 1006  Last data filed at 12/17/2023 0551  Gross per 24 hour   Intake 1130 ml   Output 3253 ml   Net -2123 ml           Physical Exam  HENT:      Head: Normocephalic and atraumatic.      Mouth/Throat:      Mouth: Mucous membranes are dry.   Eyes:      Pupils: Pupils are equal, round, and reactive to light.   Cardiovascular:      Rate and Rhythm:  Normal rate and regular rhythm.   Musculoskeletal:      Cervical back: Normal range of motion.      Comments: Impaired mobility - pt following - safety precautions   Skin:     General: Skin is warm and dry.   Neurological:      Mental Status: He is alert and oriented to person, place, and time.             Significant Labs: All pertinent labs within the past 24 hours have been reviewed.    Significant Imaging: I have reviewed all pertinent imaging results/findings within the past 24 hours.

## 2023-12-17 NOTE — PT/OT/SLP PROGRESS
Physical Therapy Treatment    Patient Name:  Catalino Mcfadden   MRN:  [id number]    Recommendations:     Discharge Recommendations: High Intensity Therapy  Discharge Equipment Recommendations: other (see comments) (TBD)  Barriers to discharge: None    Assessment:     Catalino Mcfadden is a 58 y.o. male admitted with a medical diagnosis of Debility.  He presents with the following impairments/functional limitations: weakness, impaired endurance, impaired self care skills, impaired functional mobility, gait instability, decreased lower extremity function . Awake, alert, supine in bed.  Agreed to participate in therapy. Reports feeling better overall.   Sup >sit with SBA. Performed LE exercises.  Ambulated with rw and CGA for safety. 1 LOB noted with turn into room, able to recover safely. Sat up in chair at bedside.      Rehab Prognosis: Good; patient would benefit from acute skilled PT services to address these deficits and reach maximum level of function.    Recent Surgery: * No surgery found *      Plan:     During this hospitalization, patient to be seen daily to address the identified rehab impairments via gait training, therapeutic activities, therapeutic exercises and progress toward the following goals:    Plan of Care Expires:  01/17/24    Subjective     Chief Complaint: weakness  Patient/Family Comments/goals: to go to facility for strengthening  Pain/Comfort:  Pain Rating 1: 0/10      Objective:     Communicated with nurse Angelia prior to session.  Patient found supine with bed alarm upon PT entry to room.     General Precautions: Standard, fall  Orthopedic Precautions: N/A  Braces: N/A  Respiratory Status: Room air     Functional Mobility:  Bed Mobility:     Supine to Sit: stand by assistance  Transfers:     Sit to Stand:  contact guard assistance with rolling walker  Gait: 250' with rw and CGA.      AM-PAC 6 CLICK MOBILITY          Treatment & Education:  BLE exercises: TKE's,Hip abd/add/ flexion, AP's.     Patient left  up in chair with all lines intact, call button in reach, chair alarm on, and nurse Angelia notified..    GOALS:   Multidisciplinary Problems       Physical Therapy Goals          Problem: Physical Therapy    Goal Priority Disciplines Outcome Goal Variances Interventions   Physical Therapy Goal     PT, PT/OT Ongoing, Progressing     Description: Goals to be met by: 23     Patient will increase functional independence with mobility by performin. Supine to sit with Stand-by Assistance  2. Sit to supine with Stand-by Assistance  3. Sit to stand transfer with Contact Guard Assistance  4. Bed to chair transfer with Contact Guard Assistance using Rolling Walker  5. Gait  x 25 feet with Contact Guard Assistance using Rolling Walker.                          Time Tracking:     PT Received On: 23  PT Start Time: 915     PT Stop Time: 938  PT Total Time (min): 23 min     Billable Minutes: Gait Training 10min and Therapeutic Exercise 13min    Treatment Type: Treatment  PT/PTA: PTA     Number of PTA visits since last PT visit: 1     2023

## 2023-12-17 NOTE — PROGRESS NOTES
INPATIENT NEPHROLOGY Progress Note   [facility]    Patient Name: Catalino Mcfadden  Date: 12/17/2023    Reason for consultation: ESRD    Chief Complaint:   Chief Complaint   Patient presents with    Dizziness    Vomiting    Fall     For several days      History of Present Illness:  Catalino Mcfadden is a 59 y/o M with past medical history significant for HTN, ESRD on HD, DM2, gastroparesis, diabetic foot ulcer, chronic hiccups, and HLD who presented to the ED for with episodic dizziness, vomiting, and lower extremity weakness following a fall x 1 day. He reports nausea has been resolved with Zofran. He denies chest pain, shortness of breath, fever, chills, abdominal pain, headaches, light-headedness, numbness, tingling, or LOC. He reports he missed his Saturday hemodialysis treatment but states last week he received HD Tues, Wed, Thurs, during hospitaliztion. He was recently discharged 12/07. In the ED blood pressure 200s/100s, received IV and PO antihypertensive medication with mild improvemnt. He states he did not take his medication yesterday due to N/V. CT Head negative. MRI brain neg. Consulted for dialysis.    Interval History:  12/12- BP better, on RA, no complaints  12/13- MRI L spine abnormal- getting w/u for malignancy vs paraprotein- neuro, neurosurg and heme/onc consulted  12/14 HD today, BP is high since admission- he says his BP isn't normally high  12/15 BP trends are better- trend again today- may adjust nifedipine tomorrow if BP goes up again  12/16 VSS. Tolerated HD well today, but clotted 30 min before end of therapy. OK to dc when ready.  12/17 VSS. No new complains. Dc t orehab when ready. HD Tue or PRN.    Plan of Care:    ESRD on HD TTS  HTN  Hyponatremia  Hyperkalemia  Secondary HPT  Anemia of CKD    Plan:    - continue HD TTS  - continue home BP meds- push UF 3-4L- if BP still high post HD will add BP medication  - adjust dialysate- renal diet, 1.5L fluid restriction  - continue  binder with meals  - no acute CLEMENT needs    Thank you for allowing us to participate in this patient's care. We will continue to follow.    Vital Signs:  Temp Readings from Last 3 Encounters:   12/17/23 97.5 °F (36.4 °C)   12/07/23 98.3 °F (36.8 °C)   07/06/23 98.4 °F (36.9 °C) (Oral)       Pulse Readings from Last 3 Encounters:   12/17/23 81   12/07/23 99   12/01/23 95       BP Readings from Last 3 Encounters:   12/17/23 (!) 142/75   12/07/23 (!) 185/89   12/01/23 (!) 153/76       Weight:  Wt Readings from Last 3 Encounters:   12/17/23 90.3 kg (199 lb 1.2 oz)   12/05/23 102.1 kg (225 lb)   12/01/23 96.1 kg (211 lb 13.8 oz)       Medications:  Scheduled Meds:   amitriptyline  10 mg Oral QHS    heparin (porcine)  5,000 Units Subcutaneous Q8H    hydrALAZINE  100 mg Oral TID    mupirocin   Nasal BID    NIFEdipine  90 mg Oral Daily    pantoprazole  40 mg Oral Daily    sevelamer carbonate  800 mg Oral TID WM    sodium chloride 0.9%  10 mL Intravenous Q8H     Continuous Infusions:  PRN Meds:.sodium chloride 0.9%, acetaminophen, albuterol-ipratropium, aluminum-magnesium hydroxide-simethicone, chlorproMAZINE, glucagon (human recombinant), glucose, glucose, heparin (porcine), insulin aspart U-100, melatonin, naloxone, ondansetron, prochlorperazine, senna-docusate 8.6-50 mg, sodium chloride 0.9%  No current facility-administered medications on file prior to encounter.     Current Outpatient Medications on File Prior to Encounter   Medication Sig Dispense Refill    hydrALAZINE (APRESOLINE) 100 MG tablet Take 1 tablet (100 mg total) by mouth 3 (three) times daily. 90 tablet 0    NIFEdipine (PROCARDIA XL) 90 MG (OSM) 24 hr tablet Take 1 tablet (90 mg total) by mouth once daily. 90 tablet 3    promethazine (PHENERGAN) 12.5 MG Tab Take 1 tablet (12.5 mg total) by mouth every 6 (six) hours as needed (nausea and vomiting). 30 tablet 3    amitriptyline (ELAVIL) 10 MG tablet Take 1 tablet (10 mg total) by mouth every evening. (Patient  not taking: Reported on 12/12/2023) 30 tablet 0    GAVILYTE-C 240-22.72-6.72 -5.84 gram SolR Take 4,000 mLs by mouth once.      heparin sod,pork in 0.45% NaCl (HEPARIN, PORCINE, 100 UNITS) 100 unit/100 mL (1 unit/mL) SolP in sodium chloride 0.45 % 100 mL infusion Inject 1 mL/hr into the vein continuous.      methoxy peg-epoetin beta (MIRCERA INJ) Inject 50 mcg into the skin every 28 days.      sevelamer carbonate (RENVELA) 800 mg Tab Take 1 tablet (800 mg total) by mouth 3 (three) times daily with meals. (Patient not taking: Reported on 12/12/2023) 90 tablet 11    sucroferric oxyhydroxide (VELPHORO) 500 mg Chew Take 1 tablet by mouth once daily.      torsemide (DEMADEX) 20 MG Tab Take 1 tablet by mouth once daily.       Review of Systems:  Neg    Physical Exam:  General Appearance:    NAD, AAO x 3, cooperative, appears stated age   Head:    Normocephalic, atraumatic   Eyes:    PER, EOMI, and conjunctiva/sclera clear bilaterally       Mouth:   Moist mucus membranes, no thrush or oral lesions,       normal dentition   Back:     No CVA tenderness   Lungs:     Clear to auscultation bilaterally, no wheezes, crackles,           rales or rhonchi, symmetric air movement, respirations unlabored   Chest wall:    No tenderness or deformity   Heart:    Regular rate and rhythm, S1 and S2 normal, no murmur, rub   or gallop   Abdomen:     Soft, non-tender, non-distended, bowel sounds active all four   quadrants, no RT or guarding, no masses, no organomegaly   Extremities:   Warm and well perfused, distal pulses are intact, no             cyanosis or peripheral edema   MSK:   No joint or muscle swelling, tenderness or deformity   Skin:   Skin color, texture, turgor normal, no rashes or lesions   Neurologic/Psychiatric:   CNII-XII intact, normal strength and sensation       throughout, no asterixis; normal affect, memory, judgement     and insight      Results:  Recent Labs   Lab 12/14/23  0413 12/15/23  0401 12/16/23  0432   NA  132* 132* 131*   K 5.2* 4.9 5.0   CL 96 98 98   CO2 23 21* 21*   BUN 42* 37* 56*   CREATININE 9.0* 8.0* 9.4*   GLU 82 88 94       Recent Labs   Lab 12/14/23  0413 12/15/23  0401 12/16/23  0432   CALCIUM 9.0 9.2 9.3   ALBUMIN 2.5* 2.6* 2.5*   MG 2.2 2.2 2.3       Recent Labs   Lab 02/23/22  0758   PTH, Intact 218.0 H       Recent Labs   Lab 12/14/23  1611 12/14/23  2106 12/15/23  0714 12/15/23  1130 12/15/23  1609 12/15/23  2022 12/16/23  1117 12/16/23  1617 12/16/23  2127 12/17/23  0741   POCTGLUCOSE 212* 93 93 103 88 135* 119* 87 71 85       Recent Labs   Lab 02/09/22  0722 02/23/22  0758 02/22/23  0653   Hemoglobin A1C 5.0 5.0 5.1       Recent Labs   Lab 12/14/23  0413 12/15/23  0401 12/16/23  0432   WBC 3.78* 4.32 4.12   HGB 11.3* 11.6* 10.8*   HCT 34.6* 36.2* 33.3*    131* 118*   MCV 89 90 89   MCHC 32.7 32.0 32.4   MONO 16.4*  0.6 14.6  0.6 15.5*  0.6   EOSINOPHIL 4.0 2.3 3.4       Recent Labs   Lab 12/14/23  0413 12/15/23  0401 12/16/23  0432   BILITOT 1.4* 1.4* 1.2*   PROT 7.0 7.4 6.7   ALBUMIN 2.5* 2.6* 2.5*   ALKPHOS 688* 730* 747*   ALT 31 44 46*   AST 53* 76* 73*       Recent Labs   Lab 07/27/21  1624 12/12/23  0321   Color, UA Yellow Yellow   Appearance, UA Cloudy A Clear   pH, UA 5.0 7.0   Specific [place], UA 1.010 1.010   Protein, UA 3+ A 3+ A   Glucose, UA 1+ A 1+ A   Ketones, UA Negative Negative   Urobilinogen, UA  --  Negative   Bilirubin (UA) Negative Negative   Occult Blood UA Negative Negative   Nitrite, UA Negative Negative   RBC, UA 1 1   WBC, UA 3 7 H   Bacteria Occasional Rare   Hyaline Casts, UA 0 0       Recent Labs   Lab 09/24/22  1355   Sample VENOUS       Microbiology Results (last 7 days)       ** No results found for the last 168 hours. **          I have spent >  minutes providing care for this patient for the above diagnoses. These services have included chart/data/imaging review, evaluation, exam, formulation of plan, , note preparation, and discussions with  staff involved in this patient's care.    Juan Waters MD  [facility] [address] [address]  [phone] (p)  [phone] (f)

## 2023-12-17 NOTE — PROGRESS NOTES
"[facility] Medicine  Progress Note    Patient Name: Catalino Mcfadden  MRN: [id number]  Patient Class: IP- Inpatient   Admission Date: 12/11/2023  Length of Stay: 4 days  Attending Physician: Roger Joshua MD  Primary Care Provider: Marty Rodríguez PA-C        Subjective:     Principal Problem:Debility        HPI:  Catalino Mcfadden is a 59 y/o M with past medical history significant for HTN, ESRD on HD, DM2, gastroparesis, diabetic foot ulcer, chronic hiccups, and HLD who presented to the ED for with episodic dizziness, vomiting, and lower extremity weakness following a fall x 1 day. He reports nausea has been resolved with Zofran. He denies chest pain, shortness of breath, fever, chills, abdominal pain, headaches, light-headedness, numbness, tingling, or LOC. He reports he missed his Saturday hemodialysis treatment but states last week he received HD Tues, Wed, Thurs, during hospitaliztion. He was recently discharged 12/07. Lab work reflective of ESRD with elevated bili, ALP, and AST. In the ED blood pressure 200s/100s, received IV and PO antihypertensive medication with mild improvemnt. He states he did not take his medication yesterday due to N/V. During examination patient states he is unable to lift his lower extremities, however, was able to walk into the ED with assistance from a friend. CT Head negative. Admitted to hospital medicine for hypertensive emergency evaluation and treatment with nephrology and neurology consults placed.                     Overview/Hospital Course:  Patient presented with hypertensive emergency not taking medication for blood pressure.  He reports nausea which has resolved in early morning he was able to tolerate breakfast.  Patient had also reported a fall at home a few days prior to presentation.  MRI lumbar spine obtained that showed "1. Multiple scattered osseous lesions throughout the visualized lumbosacral spine and iliac bones, nonspecific, but given " "findings on comparison chest CT from 03/22/2023 are concerning for metastatic disease.  Further evaluation recommended.   2. Mild multilevel degenerative change of the lumbar spine, as detailed above, most pronounced at L3-4 through L5-S1 and resulting in mild neural foraminal stenosis.  No significant spinal canal stenosis at any lumbar level"     Nephrology followed during hospitalization with dialysis routine as scheduled TTHS. Evaluated by Neurology and neurosurgery. Patient will need EMG/nerve conduction study after discharge. Oncology workup ongoing, however noted that, "Skeletal survey did not show any lytic lesions. He will follow up in clinic as he will likely need a biopsy of spinal lesions as outpatient - awaiting SPEP" - Palliative care consult code status remains full code - anticipate Rehab discharge on Monday.    Interval History: see HPI    Review of Systems   Constitutional:  Negative for appetite change, chills, diaphoresis and fever.   HENT:  Negative for congestion, hearing loss, sore throat, tinnitus and trouble swallowing.    Eyes:  Negative for photophobia, discharge, itching and visual disturbance.   Respiratory:  Negative for apnea, cough, wheezing and stridor.    Cardiovascular:  Negative for chest pain, palpitations and leg swelling.   Gastrointestinal:  Negative for abdominal distention, abdominal pain, blood in stool, constipation, diarrhea and nausea.   Endocrine: Negative for polydipsia, polyphagia and polyuria.   Genitourinary:  Negative for difficulty urinating, dysuria, flank pain and frequency.   Musculoskeletal:  Positive for arthralgias and gait problem. Negative for joint swelling and neck stiffness.   Skin:  Negative for color change, rash and wound.   Neurological:  Negative for dizziness, tremors, seizures, light-headedness, numbness and headaches.   Hematological:  Negative for adenopathy.   Psychiatric/Behavioral:  Negative for hallucinations and self-injury.  "     Objective:     Vital Signs (Most Recent):  Temp: 97.5 °F (36.4 °C) (12/17/23 0733)  Pulse: 81 (12/17/23 0733)  Resp: 16 (12/17/23 0733)  BP: (!) 142/75 (12/17/23 0733)  SpO2: 98 % (12/17/23 0733) Vital Signs (24h Range):  Temp:  [97 °F (36.1 °C)-98.6 °F (37 °C)] 97.5 °F (36.4 °C)  Pulse:  [80-93] 81  Resp:  [16-18] 16  SpO2:  [98 %-100 %] 98 %  BP: (117-175)/(72-87) 142/75     Weight: 90.3 kg (199 lb 1.2 oz)  Body mass index is 26.26 kg/m².    Intake/Output Summary (Last 24 hours) at 12/17/2023 1006  Last data filed at 12/17/2023 0551  Gross per 24 hour   Intake 1130 ml   Output 3253 ml   Net -2123 ml           Physical Exam  HENT:      Head: Normocephalic and atraumatic.      Mouth/Throat:      Mouth: Mucous membranes are dry.   Eyes:      Pupils: Pupils are equal, round, and reactive to light.   Cardiovascular:      Rate and Rhythm: Normal rate and regular rhythm.   Musculoskeletal:      Cervical back: Normal range of motion.      Comments: Impaired mobility - pt following - safety precautions   Skin:     General: Skin is warm and dry.   Neurological:      Mental Status: He is alert and oriented to person, place, and time.             Significant Labs: All pertinent labs within the past 24 hours have been reviewed.    Significant Imaging: I have reviewed all pertinent imaging results/findings within the past 24 hours.    Assessment/Plan:      * Debility  Patient with Acute debility due to other reduced mobility and multilevel DJD, spinal lesions . Latest AMPAC and GEMS scores have not been reviewed. Evaluation for etiology is underway. Plan includes progressive mobility protocol initated, PT/OT consulted, fall precautions in place, and Rehab .    Abnormal MRI, lumbar spine  Concerning MRI spine for metas tic disease  Ongoing workup Onclology, Neurology, and Nephrology following  Neurosurgery consulted and following paraesthesias   Neurology following - workup ongoing. Nephrology following - HD. Oncology  Per  Hem/onc Skeletal survey did not show any lytic lesions. He will follow up in clinic as he will likely need a biopsy of spinal lesions as outpatient - awaiting SPEP -   Palliative care consult code status remains full code - anticipate   PT/OT following anticipate rehab discharge on Monday      Hypertensive emergency  Resolved - controlled with BP meds and dialysis  Patient has a current diagnosis of Hypertensive emergency with end organ damage evidenced by acute kidney injury which is uncontrolled.  Latest blood pressure and vitals reviewed-   Temp:  [97.6 °F (36.4 °C)-98.5 °F (36.9 °C)]   Pulse:  [82-95]   Resp:  [16-19]   BP: (120-178)/(76-93)   SpO2:  [93 %-100 %] .   Patient currently on IV antihypertensives.   Home meds for hypertension were reviewed and noted below.   Hypertension Medications               hydrALAZINE (APRESOLINE) 100 MG tablet Take 1 tablet (100 mg total) by mouth 3 (three) times daily.    NIFEdipine (PROCARDIA XL) 90 MG (OSM) 24 hr tablet Take 1 tablet (90 mg total) by mouth once daily.    torsemide (DEMADEX) 20 MG Tab Take 1 tablet by mouth once daily.              Will aim for controlled BP reduction by medications noted above. Monitor and mitigate end organ damage as indicated.  BP improved with re-introducing home meds  Follow BP trends and adjust as warranted    ESRD on hemodialysis  Creatine stable for now. BMP reviewed - noted Estimated Creatinine Clearance: 9.6 mL/min (A) (based on SCr of 9.4 mg/dL (H)). according to latest data. Based on current GFR, CKD stage is end stage.  Monitor UOP and serial BMP and adjust therapy as needed. Renally dose meds. Avoid nephrotoxic medications and procedures.  -AV fistula noted without complications -  monitoring  -dialysis ongoing  -nephrology following most recent dialysis 12/15/23    Lower extremity weakness  Possibly due to hypertensive emergency; patient states unable to lift legs against gravity  S/p fall at home due to bilateral lower  "extremity weakness  CT head negative  Neurology consult placed      Serum total bilirubin elevated  Chronic condition noted  Monitor bmp    Intractable hiccups  Chronic condition  Continue prn thorazine       Type 2 diabetes mellitus with stage 5 chronic kidney disease  Patient's FSGs are controlled on current medication regimen.  Last A1c reviewed-   Lab Results   Component Value Date    HGBA1C 5.1 02/22/2023     Most recent fingerstick glucose reviewed- No results for input(s): "POCTGLUCOSE" in the last 24 hours.  Current correctional scale  Medium  Maintain anti-hyperglycemic dose as follows-   Antihyperglycemics (From admission, onward)      Start     Stop Route Frequency Ordered    12/12/23 0054  insulin aspart U-100 pen 0-10 Units         -- SubQ Before meals & nightly PRN 12/12/23 0054          Hold Oral hypoglycemics while patient is in the hospital.      VTE Risk Mitigation (From admission, onward)           Ordered     heparin (porcine) injection 4,000 Units  As needed (PRN)         12/14/23 0837     heparin (porcine) injection 5,000 Units  Every 8 hours         12/12/23 0054     IP VTE HIGH RISK PATIENT  Once         12/12/23 0054     Place sequential compression device  Until discontinued         12/12/23 0054                    Discharge Planning   WILMER: 12/18/2023     Code Status: Full Code   Is the patient medically ready for discharge?:     Reason for patient still in hospital (select all that apply): Treatment, Consult recommendations, PT / OT recommendations, and Pending disposition  Discharge Plan A: Rehab   Discharge Delays: None known at this time        Belén Christine, DNP, APRN, FNP-C  [facility] Medicine  [facility]  [email or web address]  "

## 2023-12-17 NOTE — PLAN OF CARE
Problem: Physical Therapy  Goal: Physical Therapy Goal  Description: Goals to be met by: 23     Patient will increase functional independence with mobility by performin. Supine to sit with Stand-by Assistance  2. Sit to supine with Stand-by Assistance  3. Sit to stand transfer with Contact Guard Assistance  4. Bed to chair transfer with Contact Guard Assistance using Rolling Walker  5. Gait  x 25 feet with Contact Guard Assistance using Rolling Walker.     Outcome: Ongoing, Progressing   Ambulate with rw and assistance for safety.

## 2023-12-18 VITALS
TEMPERATURE: 97 F | WEIGHT: 203.94 LBS | SYSTOLIC BLOOD PRESSURE: 144 MMHG | OXYGEN SATURATION: 98 % | HEIGHT: 73 IN | DIASTOLIC BLOOD PRESSURE: 79 MMHG | RESPIRATION RATE: 18 BRPM | BODY MASS INDEX: 27.03 KG/M2 | HEART RATE: 99 BPM

## 2023-12-18 LAB
ALBUMIN SERPL BCP-MCNC: 2.5 G/DL (ref 3.5–5.2)
ALP SERPL-CCNC: 689 U/L (ref 55–135)
ALT SERPL W/O P-5'-P-CCNC: 46 U/L (ref 10–44)
ANION GAP SERPL CALC-SCNC: 13 MMOL/L (ref 8–16)
AST SERPL-CCNC: 60 U/L (ref 10–40)
BASOPHILS # BLD AUTO: 0.04 K/UL (ref 0–0.2)
BASOPHILS NFR BLD: 0.8 % (ref 0–1.9)
BILIRUB SERPL-MCNC: 1.2 MG/DL (ref 0.1–1)
BUN SERPL-MCNC: 69 MG/DL (ref 6–20)
CALCIUM SERPL-MCNC: 9.4 MG/DL (ref 8.7–10.5)
CHLORIDE SERPL-SCNC: 96 MMOL/L (ref 95–110)
CO2 SERPL-SCNC: 19 MMOL/L (ref 23–29)
CREAT SERPL-MCNC: 9.9 MG/DL (ref 0.5–1.4)
DIFFERENTIAL METHOD BLD: ABNORMAL
EOSINOPHIL # BLD AUTO: 0.2 K/UL (ref 0–0.5)
EOSINOPHIL NFR BLD: 3.8 % (ref 0–8)
ERYTHROCYTE [DISTWIDTH] IN BLOOD BY AUTOMATED COUNT: 15.3 % (ref 11.5–14.5)
EST. GFR  (NO RACE VARIABLE): 6 ML/MIN/1.73 M^2
GLUCOSE SERPL-MCNC: 103 MG/DL (ref 70–110)
HCT VFR BLD AUTO: 31.1 % (ref 40–54)
HGB BLD-MCNC: 10.3 G/DL (ref 14–18)
IMM GRANULOCYTES # BLD AUTO: 0.02 K/UL (ref 0–0.04)
IMM GRANULOCYTES NFR BLD AUTO: 0.4 % (ref 0–0.5)
LGI1-IGG CBA: NEGATIVE
LYMPHOCYTES # BLD AUTO: 0.7 K/UL (ref 1–4.8)
LYMPHOCYTES NFR BLD: 14.4 % (ref 18–48)
MAGNESIUM SERPL-MCNC: 2.4 MG/DL (ref 1.6–2.6)
MCH RBC QN AUTO: 29.3 PG (ref 27–31)
MCHC RBC AUTO-ENTMCNC: 33.1 G/DL (ref 32–36)
MCV RBC AUTO: 88 FL (ref 82–98)
MONOCYTES # BLD AUTO: 0.6 K/UL (ref 0.3–1)
MONOCYTES NFR BLD: 11.7 % (ref 4–15)
NEUTROPHILS # BLD AUTO: 3.3 K/UL (ref 1.8–7.7)
NEUTROPHILS NFR BLD: 68.9 % (ref 38–73)
NRBC BLD-RTO: 0 /100 WBC
PLATELET # BLD AUTO: 99 K/UL (ref 150–450)
PLATELET BLD QL SMEAR: ABNORMAL
PMV BLD AUTO: 11 FL (ref 9.2–12.9)
POCT GLUCOSE: 103 MG/DL (ref 70–110)
POCT GLUCOSE: 106 MG/DL (ref 70–110)
POCT GLUCOSE: 83 MG/DL (ref 70–110)
POTASSIUM SERPL-SCNC: 4.4 MMOL/L (ref 3.5–5.1)
POTASSIUM SERPL-SCNC: 6.1 MMOL/L (ref 3.5–5.1)
PROT SERPL-MCNC: 6.9 G/DL (ref 6–8.4)
RBC # BLD AUTO: 3.52 M/UL (ref 4.6–6.2)
SODIUM SERPL-SCNC: 128 MMOL/L (ref 136–145)
WBC # BLD AUTO: 4.79 K/UL (ref 3.9–12.7)

## 2023-12-18 PROCEDURE — 85025 COMPLETE CBC W/AUTO DIFF WBC: CPT | Performed by: NURSE PRACTITIONER

## 2023-12-18 PROCEDURE — 36415 COLL VENOUS BLD VENIPUNCTURE: CPT | Performed by: NURSE PRACTITIONER

## 2023-12-18 PROCEDURE — 63600175 PHARM REV CODE 636 W HCPCS: Performed by: INTERNAL MEDICINE

## 2023-12-18 PROCEDURE — A4216 STERILE WATER/SALINE, 10 ML: HCPCS

## 2023-12-18 PROCEDURE — 63600175 PHARM REV CODE 636 W HCPCS

## 2023-12-18 PROCEDURE — 83735 ASSAY OF MAGNESIUM: CPT | Performed by: NURSE PRACTITIONER

## 2023-12-18 PROCEDURE — 25000003 PHARM REV CODE 250: Performed by: INTERNAL MEDICINE

## 2023-12-18 PROCEDURE — 63600175 PHARM REV CODE 636 W HCPCS: Performed by: NURSE PRACTITIONER

## 2023-12-18 PROCEDURE — 84132 ASSAY OF SERUM POTASSIUM: CPT | Performed by: INTERNAL MEDICINE

## 2023-12-18 PROCEDURE — 99900035 HC TECH TIME PER 15 MIN (STAT)

## 2023-12-18 PROCEDURE — 25000003 PHARM REV CODE 250: Performed by: NURSE PRACTITIONER

## 2023-12-18 PROCEDURE — 99222 1ST HOSP IP/OBS MODERATE 55: CPT | Mod: ,,, | Performed by: PHYSICAL MEDICINE & REHABILITATION

## 2023-12-18 PROCEDURE — 90935 HEMODIALYSIS ONE EVALUATION: CPT

## 2023-12-18 PROCEDURE — 36415 COLL VENOUS BLD VENIPUNCTURE: CPT | Performed by: INTERNAL MEDICINE

## 2023-12-18 PROCEDURE — 80053 COMPREHEN METABOLIC PANEL: CPT | Performed by: NURSE PRACTITIONER

## 2023-12-18 PROCEDURE — 82784 ASSAY IGA/IGD/IGG/IGM EACH: CPT | Mod: 91 | Performed by: NURSE PRACTITIONER

## 2023-12-18 PROCEDURE — 25000003 PHARM REV CODE 250

## 2023-12-18 PROCEDURE — 94761 N-INVAS EAR/PLS OXIMETRY MLT: CPT

## 2023-12-18 RX ORDER — DEXTROSE 50 % IN WATER (D50W) INTRAVENOUS SYRINGE
25 ONCE
Status: COMPLETED | OUTPATIENT
Start: 2023-12-18 | End: 2023-12-18

## 2023-12-18 RX ORDER — CALCIUM GLUCONATE 20 MG/ML
1 INJECTION, SOLUTION INTRAVENOUS ONCE
Status: COMPLETED | OUTPATIENT
Start: 2023-12-18 | End: 2023-12-18

## 2023-12-18 RX ORDER — CALCIUM GLUCONATE 98 MG/ML
1 INJECTION, SOLUTION INTRAVENOUS ONCE
Status: DISCONTINUED | OUTPATIENT
Start: 2023-12-18 | End: 2023-12-18

## 2023-12-18 RX ADMIN — DEXTROSE MONOHYDRATE 25 G: 25 INJECTION, SOLUTION INTRAVENOUS at 07:12

## 2023-12-18 RX ADMIN — INSULIN HUMAN 8 UNITS: 100 INJECTION, SOLUTION PARENTERAL at 07:12

## 2023-12-18 RX ADMIN — Medication 10 ML: at 06:12

## 2023-12-18 RX ADMIN — SEVELAMER CARBONATE 800 MG: 800 TABLET, FILM COATED ORAL at 12:12

## 2023-12-18 RX ADMIN — ONDANSETRON 4 MG: 2 INJECTION INTRAMUSCULAR; INTRAVENOUS at 12:12

## 2023-12-18 RX ADMIN — HEPARIN SODIUM 5000 UNITS: 5000 INJECTION, SOLUTION INTRAVENOUS; SUBCUTANEOUS at 06:12

## 2023-12-18 RX ADMIN — CALCIUM GLUCONATE 1 G: 20 INJECTION, SOLUTION INTRAVENOUS at 07:12

## 2023-12-18 RX ADMIN — CHLORPROMAZINE HYDROCHLORIDE 25 MG: 25 TABLET, SUGAR COATED ORAL at 01:12

## 2023-12-18 RX ADMIN — HEPARIN SODIUM 4000 UNITS: 1000 INJECTION, SOLUTION INTRAVENOUS; SUBCUTANEOUS at 10:12

## 2023-12-18 NOTE — PROGRESS NOTES
12/18/23 1120   Vital Signs   Temp 97.4 °F (36.3 °C)   Pulse 99   Resp 18   SpO2 98 %   Device (Oxygen Therapy) room air   BP (!) 144/79   Post-Hemodialysis Assessment   Rinseback Volume (mL) 250 mL   Blood Volume Processed (Liters) 59.7 L   Dialyzer Clearance Lightly streaked   Duration of Treatment 180 minutes   Total UF (mL) 3500 mL   Net Fluid Removal 3000   Patient Response to Treatment tolerated well   Post-Treatment Weight 88.7 kg (195 lb 8.8 oz)   Treatment Weight Change -3.3   Post-Hemodialysis Comments no problems

## 2023-12-18 NOTE — PROGRESS NOTES
INPATIENT NEPHROLOGY Progress Note   [facility]    Patient Name: Catalino Mcfadden  Date: 12/18/2023    Reason for consultation: ESRD    Chief Complaint:   Chief Complaint   Patient presents with    Dizziness    Vomiting    Fall     For several days      History of Present Illness:  Catalino Mcfadden is a 59 y/o M with past medical history significant for HTN, ESRD on HD, DM2, gastroparesis, diabetic foot ulcer, chronic hiccups, and HLD who presented to the ED for with episodic dizziness, vomiting, and lower extremity weakness following a fall x 1 day. He reports nausea has been resolved with Zofran. He denies chest pain, shortness of breath, fever, chills, abdominal pain, headaches, light-headedness, numbness, tingling, or LOC. He reports he missed his Saturday hemodialysis treatment but states last week he received HD Tues, Wed, Thurs, during hospitaliztion. He was recently discharged 12/07. In the ED blood pressure 200s/100s, received IV and PO antihypertensive medication with mild improvemnt. He states he did not take his medication yesterday due to N/V. CT Head negative. MRI brain neg. Consulted for dialysis.    Interval History:  12/12- BP better, on RA, no complaints  12/13- MRI L spine abnormal- getting w/u for malignancy vs paraprotein- neuro, neurosurg and heme/onc consulted  12/14 HD today, BP is high since admission- he says his BP isn't normally high  12/15 BP trends are better- trend again today- may adjust nifedipine tomorrow if BP goes up again  12/16 VSS. Tolerated HD well today, but clotted 30 min before end of therapy. OK to dc when ready.  12/17 VSS. No new complains. Dc t orehab when ready. HD Tue or PRN.  12/18 VSS. Hyperkalemia noted. Agree with Insulin+D50 and Ca glucagone. Will do urgent HD today, then he can be dc. Hold dc until he is dialyzed.    Plan of Care:    ESRD on HD TTS  HTN  Hyponatremia  Hyperkalemia  Secondary HPT  Anemia of CKD    Plan:    -Urgent HD today for  hyperkalemia before dc to rehab.  - continue HD TTS  - continue home BP meds- push UF 3-4L- if BP still high post HD will add BP medication  - adjust dialysate- renal diet, 1.5L fluid restriction  - continue binder with meals  - no acute CLEMENT needs    Thank you for allowing us to participate in this patient's care. We will continue to follow.    Vital Signs:  Temp Readings from Last 3 Encounters:   12/18/23 98.8 °F (37.1 °C) (Tympanic)   12/07/23 98.3 °F (36.8 °C)   07/06/23 98.4 °F (36.9 °C) (Oral)       Pulse Readings from Last 3 Encounters:   12/18/23 91   12/07/23 99   12/01/23 95       BP Readings from Last 3 Encounters:   12/18/23 (!) 150/72   12/07/23 (!) 185/89   12/01/23 (!) 153/76       Weight:  Wt Readings from Last 3 Encounters:   12/18/23 92.5 kg (203 lb 14.8 oz)   12/05/23 102.1 kg (225 lb)   12/01/23 96.1 kg (211 lb 13.8 oz)       Medications:  Scheduled Meds:   amitriptyline  10 mg Oral QHS    calcium gluconate  1 g Intravenous Once    dextrose 50 % in water (D50W)  25 g Intravenous Once    heparin (porcine)  5,000 Units Subcutaneous Q8H    hydrALAZINE  100 mg Oral TID    insulin regular  8 Units Intravenous Once    mupirocin   Nasal BID    NIFEdipine  90 mg Oral Daily    pantoprazole  40 mg Oral Daily    sevelamer carbonate  800 mg Oral TID WM    sodium chloride 0.9%  10 mL Intravenous Q8H     Continuous Infusions:  PRN Meds:.sodium chloride 0.9%, acetaminophen, albuterol-ipratropium, aluminum-magnesium hydroxide-simethicone, chlorproMAZINE, glucagon (human recombinant), glucose, glucose, heparin (porcine), insulin aspart U-100, melatonin, naloxone, ondansetron, prochlorperazine, senna-docusate 8.6-50 mg, sodium chloride 0.9%  No current facility-administered medications on file prior to encounter.     Current Outpatient Medications on File Prior to Encounter   Medication Sig Dispense Refill    hydrALAZINE (APRESOLINE) 100 MG tablet Take 1 tablet (100 mg total) by mouth 3 (three) times daily. 90  tablet 0    NIFEdipine (PROCARDIA XL) 90 MG (OSM) 24 hr tablet Take 1 tablet (90 mg total) by mouth once daily. 90 tablet 3    promethazine (PHENERGAN) 12.5 MG Tab Take 1 tablet (12.5 mg total) by mouth every 6 (six) hours as needed (nausea and vomiting). 30 tablet 3    amitriptyline (ELAVIL) 10 MG tablet Take 1 tablet (10 mg total) by mouth every evening. (Patient not taking: Reported on 12/12/2023) 30 tablet 0    GAVILYTE-C 240-22.72-6.72 -5.84 gram SolR Take 4,000 mLs by mouth once.      heparin sod,pork in 0.45% NaCl (HEPARIN, PORCINE, 100 UNITS) 100 unit/100 mL (1 unit/mL) SolP in sodium chloride 0.45 % 100 mL infusion Inject 1 mL/hr into the vein continuous.      methoxy peg-epoetin beta (MIRCERA INJ) Inject 50 mcg into the skin every 28 days.      sevelamer carbonate (RENVELA) 800 mg Tab Take 1 tablet (800 mg total) by mouth 3 (three) times daily with meals. (Patient not taking: Reported on 12/12/2023) 90 tablet 11    sucroferric oxyhydroxide (VELPHORO) 500 mg Chew Take 1 tablet by mouth once daily.      torsemide (DEMADEX) 20 MG Tab Take 1 tablet by mouth once daily.       Review of Systems:  Neg    Physical Exam:  General Appearance:    NAD, AAO x 3, cooperative, appears stated age   Head:    Normocephalic, atraumatic   Eyes:    PER, EOMI, and conjunctiva/sclera clear bilaterally       Mouth:   Moist mucus membranes, no thrush or oral lesions,       normal dentition   Back:     No CVA tenderness   Lungs:     Clear to auscultation bilaterally, no wheezes, crackles,           rales or rhonchi, symmetric air movement, respirations unlabored   Chest wall:    No tenderness or deformity   Heart:    Regular rate and rhythm, S1 and S2 normal, no murmur, rub   or gallop   Abdomen:     Soft, non-tender, non-distended, bowel sounds active all four   quadrants, no RT or guarding, no masses, no organomegaly   Extremities:   Warm and well perfused, distal pulses are intact, no             cyanosis or peripheral edema    MSK:   No joint or muscle swelling, tenderness or deformity   Skin:   Skin color, texture, turgor normal, no rashes or lesions   Neurologic/Psychiatric:   CNII-XII intact, normal strength and sensation       throughout, no asterixis; normal affect, memory, judgement     and insight      Results:  Recent Labs   Lab 12/15/23  0401 12/16/23  0432 12/18/23  0408   * 131* 128*   K 4.9 5.0 6.1*   CL 98 98 96   CO2 21* 21* 19*   BUN 37* 56* 69*   CREATININE 8.0* 9.4* 9.9*   GLU 88 94 103         Recent Labs   Lab 12/15/23  0401 12/16/23  0432 12/18/23  0408   CALCIUM 9.2 9.3 9.4   ALBUMIN 2.6* 2.5* 2.5*   MG 2.2 2.3 2.4         Recent Labs   Lab 02/23/22  0758   PTH, Intact 218.0 H         Recent Labs   Lab 12/15/23  1609 12/15/23  2022 12/16/23  1117 12/16/23  1617 12/16/23  2127 12/17/23  0741 12/17/23  1155 12/17/23  1732 12/17/23  2137 12/18/23  0329   POCTGLUCOSE 88 135* 119* 87 71 85 89 103 82 106         Recent Labs   Lab 02/09/22  0722 02/23/22  0758 02/22/23  0653   Hemoglobin A1C 5.0 5.0 5.1         Recent Labs   Lab 12/15/23  0401 12/16/23  0432 12/18/23  0408   WBC 4.32 4.12 4.79   HGB 11.6* 10.8* 10.3*   HCT 36.2* 33.3* 31.1*   * 118* 99*   MCV 90 89 88   MCHC 32.0 32.4 33.1   MONO 14.6  0.6 15.5*  0.6 11.7  0.6   EOSINOPHIL 2.3 3.4 3.8         Recent Labs   Lab 12/15/23  0401 12/16/23  0432 12/18/23  0408   BILITOT 1.4* 1.2* 1.2*   PROT 7.4 6.7 6.9   ALBUMIN 2.6* 2.5* 2.5*   ALKPHOS 730* 747* 689*   ALT 44 46* 46*   AST 76* 73* 60*         Recent Labs   Lab 07/27/21  1624 12/12/23  0321   Color, UA Yellow Yellow   Appearance, UA Cloudy A Clear   pH, UA 5.0 7.0   Specific [place], UA 1.010 1.010   Protein, UA 3+ A 3+ A   Glucose, UA 1+ A 1+ A   Ketones, UA Negative Negative   Urobilinogen, UA  --  Negative   Bilirubin (UA) Negative Negative   Occult Blood UA Negative Negative   Nitrite, UA Negative Negative   RBC, UA 1 1   WBC, UA 3 7 H   Bacteria Occasional Rare   Hyaline Casts, UA 0 0          Recent Labs   Lab 09/24/22  1355   Sample VENOUS         Microbiology Results (last 7 days)       ** No results found for the last 168 hours. **          I have spent >  minutes providing care for this patient for the above diagnoses. These services have included chart/data/imaging review, evaluation, exam, formulation of plan, , note preparation, and discussions with staff involved in this patient's care.    Juan Waters MD  [facility] [address] [address]  [phone] (p)  [phone] (f)

## 2023-12-18 NOTE — DISCHARGE SUMMARY
[facility] Medicine  Discharge Summary      Patient Name: Catalino Mcfadden  MRN: [id number]  CASANDRA: [id number]  Patient Class: IP- Inpatient  Admission Date: 12/11/2023  Hospital Length of Stay: 5 days  Discharge Date and Time: 12/18/2023  1:27 PM  Attending Physician: Henry Pettit MD   Discharging Provider: Leticia Isabel NP  Primary Care Provider: Marty Rodríguez PA-C    Primary Care Team: Networked reference to record PCT     HPI:   Catalino Mcfadden is a 59 y/o M with past medical history significant for HTN, ESRD on HD, DM2, gastroparesis, diabetic foot ulcer, chronic hiccups, and HLD who presented to the ED for with episodic dizziness, vomiting, and lower extremity weakness following a fall x 1 day. He reports nausea has been resolved with Zofran. He denies chest pain, shortness of breath, fever, chills, abdominal pain, headaches, light-headedness, numbness, tingling, or LOC. He reports he missed his Saturday hemodialysis treatment but states last week he received HD Tues, Wed, Thurs, during hospitaliztion. He was recently discharged 12/07. Lab work reflective of ESRD with elevated bili, ALP, and AST. In the ED blood pressure 200s/100s, received IV and PO antihypertensive medication with mild improvemnt. He states he did not take his medication yesterday due to N/V. During examination patient states he is unable to lift his lower extremities, however, was able to walk into the ED with assistance from a friend. CT Head negative. Admitted to hospital medicine for hypertensive emergency evaluation and treatment with nephrology and neurology consults placed.                     * No surgery found *      Hospital Course:   Patient presented with hypertensive emergency not taking medication for blood pressure.  He reports nausea which has resolved in early morning he was able to tolerate breakfast.  Patient had also reported a fall at home a few days prior to presentation.  MRI lumbar spine  "obtained that showed was concerning for metastatic disease.  Nephrology followed during hospitalization with dialysis routine as scheduled [facility].  Patient was evaluated by Neurology and neurosurgery.  Recommendations were made for EMG/nerve conduction study after discharge. Oncology was consulted.  Oncology workup was started.  Patient is to follow-up outpatient for biopsy of spinal lesions, however noted that, "Skeletal survey did not show any lytic lesions. He will follow up in clinic as he will likely need a biopsy of spinal lesions as outpatient - awaiting SPEP" - Palliative care consult code status remains full code. He did have hyperkalemia and was treated with dialysis.  PT and OT were consulted.  Case management was consulted for discharge planning and rehab placement.  He was cleared from all perspectives for discharge to rehab.     Goals of Care Treatment Preferences:  Code Status: Full Code          What is most important right now is to focus on spending time at home, avoiding the hospital, remaining as independent as possible.  Accordingly, we have decided that the best plan to meet the patient's goals includes continuing with treatment.      Consults:   Consults (From admission, onward)          Status Ordering Provider     Inpatient consult to Social Work/Case Management  Once        Provider:  (Not yet assigned)    Acknowledged SAMANTHA BELLAA BETZAIDA.     Inpatient consult to Palliative Care  Once        Provider:  Jung Elder MD    Completed JENA, ELIJAH V.     Inpatient consult to Hematology/Oncology  Once        Provider:  Brandi Harris NP    Acknowledged SCARLET BELLARIA BETZAIDA.     Inpatient consult to Neurosurgery  Once        Provider:  Soheila Rico PA-C    Acknowledged SAMANTHA BELLAA BETZAIDA.     Inpatient consult to Neurology  Once        Provider:  Patricia Duenas MD    Completed ALESSANDRA FANG     Inpatient consult to Nephrology  Once        Provider:  Oscar Canas MD    Completed " 2 ALESSANDRA FANG            No new Assessment & Plan notes have been filed under this hospital service since the last note was generated.  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 [S34.111A] 12/15/2023 Unknown    Goals of care, counseling/discussion [Z71.89] 12/14/2023 Not Applicable    Hypertensive emergency [I16.1] 12/12/2023 Yes    Lower extremity weakness [R29.898] 12/12/2023 Yes    Abnormal MRI, lumbar spine [R93.7] 12/12/2023 Yes    Intractable hiccups [R06.6] 12/04/2023 Yes    Serum total bilirubin elevated [R17] 12/04/2023 Yes    ESRD on hemodialysis [N18.6, Z99.2] 02/22/2023 Not Applicable    Type 2 diabetes mellitus with stage 5 chronic kidney disease [E11.22, N18.5] 08/26/2014 Yes      Problems Resolved During this Admission:    Diagnosis Date Noted Date Resolved POA    ESRD (end stage renal disease) [N18.6]  12/12/2023 Yes       Discharged Condition: good    Disposition: Rehab Facility    Follow Up:   Follow-up Information       Nunu Hill MD Follow up.    Specialties: Hematology and Oncology, Hematology, Oncology  Why: 12/20 @ 10:20 am  Contact information:  [address]  [address]  [place] [address]  [phone]               Marty Rodríguez PA-C Follow up in 1 week(s).    Specialty: Family Medicine  Contact information:  [address]  [place] [address]  [phone]                           Patient Instructions:      Ambulatory referral/consult to Home Health   Standing Status: Future   Referral Priority: Routine Referral Type: Home Health   Referral Reason: Specialty Services Required   Requested Specialty: Home Health Services   Number of Visits Requested: 1     Diet renal     Diet renal     Activity as tolerated       Significant Diagnostic Studies: Labs: CMP    Recent Labs   Lab 12/18/23  0408 12/18/23  1138   *  --    K 6.1* 4.4   CL 96  --    CO2 19*  --      --    BUN 69*  --    CREATININE 9.9*  --    CALCIUM 9.4  --    PROT 6.9  --    ALBUMIN 2.5*  --    BILITOT 1.2*  --    ALKPHOS 689*  --    AST 60*  --    ALT 46*  --    ANIONGAP 13  --     and CBC   Recent Labs   Lab 12/18/23  0408   WBC 4.79   HGB 10.3*   HCT 31.1*   PLT 99*     Radiology:   Procedure Component Value Units Date/Time   X-Ray Metastatic Survey [[id number]] Resulted: 12/13/23 1602   Order Status: Completed Updated: 12/13/23 1604   Narrative:     EXAMINATION:   XR METASTATIC SURVEY     CLINICAL HISTORY:   Bone lesion;  End stage renal disease     TECHNIQUE:   Radiographs of the skull, whole spine, humeri, bony pelvis, femurs and ribs     COMPARISON:   None     FINDINGS:   No discrete lytic or blastic lesion by radiograph.  There is straightening of normal cervical lordosis with interbody fusion C5-6 and C6-7.  Between mild-to-moderate degenerative change right AC and glenohumeral joints.  Mild degenerative change left shoulder and elbow.  Surgical clips left antecubital fossa.  Mild degenerative change of both hip joints.  Atherosclerosis.  Mild degenerative change of both knees.  Central venous access right chest with tip position over the right heart border, likely in the right atrium.       Electronically signed by: Eren Bennett   Date: 12/13/2023   Time: 16:02   CT Chest Without Contrast [[id number]] Resulted: 12/13/23 0855   Order Status: Completed Updated: 12/13/23 0858   Narrative:     EXAMINATION:   CT CHEST WITHOUT CONTRAST; CT ABDOMEN PELVIS WITHOUT CONTRAST     CLINICAL HISTORY:   Occult malignancy;     TECHNIQUE:   Low dose axial images, sagittal and coronal reformations were obtained from the thoracic inlet to the lung bases. Contrast was not administered.     Additionally axial images through the abdomen and pelvis were acquired without contrast.  No oral contrast was  administered.     COMPARISON:   Chest CT 03/22/2023;     FINDINGS:   A right IJ dialysis catheter has its tip in the right atrium.  The heart is enlarged demonstrating a small pericardial effusion which is without significant change.  Calcification of the coronary arteries is present.     No hilar, mediastinal, or axial lymphadenopathy.  Previously enlarged pre-vascular lymph node has decreased from 14 mm to 8 mm.     There are an abundance of a micro nodules and tree-in-bud opacities within the right lung, most pronounced within the upper and middle lobes.  These are mildly improved.  There is no pleural effusion.     The liver demonstrates a markedly heterogeneous attenuation pattern, without distinct mass identified.     Multiple gallstones are present.  There is no biliary dilatation.  The pancreas and adrenal glands are unremarkable.     The spleen is moderately enlarged at 16 cm.  There is mild upper abdominal lymphadenopathy.  A representative larger node is a pericaval node measuring 1.4 cm in short axis.     The kidneys are unremarkable.     The small bowel is normal caliber and appearance.  The appendix is normal.  The colon is unremarkable.     The prostate gland is moderately enlarged.     There are no suspicious osseous lesions.  There is postoperative change of the cervical spine.    Impression:       Extensive right lung predominant pulmonary micronodules, mildly improved and favoring sequelae of an atypical infectious process.  Resolution of previous mild mediastinal lymphadenopathy.     Abnormal hepatic attenuation pattern.  This is suspected to be related to chronic hepatic congestion, likely with atypical pattern of parenchymal fat deposition.  However further evaluation with MRI is recommended to help exclude suspicion for more aggressive process.     Moderate splenomegaly.     Nonspecific mild upper abdominal lymphadenopathy.     Cardiomegaly.  Small pericardial effusion.  Right IJ dialysis  catheter.     Prostatomegaly.       Electronically signed by: James Arguello MD   Date: 12/13/2023   Time: 08:55   CT Abdomen Pelvis Without Contrast [[id number]] Resulted: 12/13/23 0855   Order Status: Completed Updated: 12/13/23 0858   Narrative:     EXAMINATION:   CT CHEST WITHOUT CONTRAST; CT ABDOMEN PELVIS WITHOUT CONTRAST     CLINICAL HISTORY:   Occult malignancy;     TECHNIQUE:   Low dose axial images, sagittal and coronal reformations were obtained from the thoracic inlet to the lung bases. Contrast was not administered.     Additionally axial images through the abdomen and pelvis were acquired without contrast.  No oral contrast was administered.     COMPARISON:   Chest CT 03/22/2023;     FINDINGS:   A right IJ dialysis catheter has its tip in the right atrium.  The heart is enlarged demonstrating a small pericardial effusion which is without significant change.  Calcification of the coronary arteries is present.     No hilar, mediastinal, or axial lymphadenopathy.  Previously enlarged pre-vascular lymph node has decreased from 14 mm to 8 mm.     There are an abundance of a micro nodules and tree-in-bud opacities within the right lung, most pronounced within the upper and middle lobes.  These are mildly improved.  There is no pleural effusion.     The liver demonstrates a markedly heterogeneous attenuation pattern, without distinct mass identified.     Multiple gallstones are present.  There is no biliary dilatation.  The pancreas and adrenal glands are unremarkable.     The spleen is moderately enlarged at 16 cm.  There is mild upper abdominal lymphadenopathy.  A representative larger node is a pericaval node measuring 1.4 cm in short axis.     The kidneys are unremarkable.     The small bowel is normal caliber and appearance.  The appendix is normal.  The colon is unremarkable.     The prostate gland is moderately enlarged.     There are no suspicious osseous lesions.  There is postoperative change of  the cervical spine.    Impression:       Extensive right lung predominant pulmonary micronodules, mildly improved and favoring sequelae of an atypical infectious process.  Resolution of previous mild mediastinal lymphadenopathy.     Abnormal hepatic attenuation pattern.  This is suspected to be related to chronic hepatic congestion, likely with atypical pattern of parenchymal fat deposition.  However further evaluation with MRI is recommended to help exclude suspicion for more aggressive process.     Moderate splenomegaly.     Nonspecific mild upper abdominal lymphadenopathy.     Cardiomegaly.  Small pericardial effusion.  Right IJ dialysis catheter.     Prostatomegaly.       Electronically signed by: James Arguello MD   Date: 12/13/2023   Time: 08:55   MRI Cervical Spine Without Contrast [[id number]] Resulted: 12/13/23 0619   Order Status: Completed Updated: 12/13/23 0621   Narrative:     EXAMINATION:   MRI CERVICAL SPINE WITHOUT CONTRAST     CLINICAL HISTORY:   Metastatic disease evaluation;.  .     TECHNIQUE:   Multiplanar, multisequence MR images of the cervical spine were acquired without the administration of contrast.     COMPARISON:   None.     FINDINGS:   Vertebral column: There are degenerative and postsurgical changes which will be described by level.  There is artifact from previous anterior fusion of C5 through C7.  The odontoid process is intact.  There is mild disc space narrowing at the C3-4, C4-5 and C7-T1 levels.  Baseline marrow signal intensity is mildly diffusely T1 hypointense but there is no pathologic marrow signal abnormality.  The discs are desiccated.  Vertebral body height is preserved.  There is no fracture.  Alignment is normal.     Spinal canal, cord, epidural space: The spinal canal is small on a developmental basis.  There is no abnormal epidural mass or fluid collection.  There is subtle T2 hyperintense signal in the left side of the cord at the level of C2, right paramedian cord  at the level of C3-4 and suspected more diffusely at the level of C5-6.  The latter findings likely represent changes of myelomalacia.  Scored signal at the level of C2 is more nonspecific.     Findings by level:     C2-3: There is mild left foraminal stenosis due to changes of uncovertebral spurring and facet joint arthropathy.  There is a mild disc osteophyte complex.  There is no significant spinal stenosis.     C3-4: There is bilateral uncovertebral spurring with a broad disc osteophyte complex which narrows the subarachnoid space and contributes to moderate spinal stenosis.  There is mild-to-moderate right and mild left foraminal stenosis.     C4-5: There is mild bilateral uncovertebral spurring.  There is a broad disc osteophyte complex contributing to mild-to-moderate spinal stenosis.  There is only mild left foraminal stenosis.     C5-6: There is artifact from fusion hardware.  There is a broad disc osteophyte complex, mild bilateral uncovertebral spurring.  There is moderate to severe spinal stenosis with only mild left but moderate right foraminal stenosis.     C6-7: There is left paracentral hypointense material which could represent bone spur osteophyte contributing to severe left lateral recess and proximal foraminal stenosis.  There is moderate to severe spinal stenosis.  There is left greater than right uncovertebral spurring with mild facet joint arthropathy.     C7-T1: There is left greater than right uncovertebral spurring and facet joint arthropathy resulting in at least moderate left foraminal stenosis.  There is also a broad left paracentral disc protrusion which narrows the ventral subarachnoid space and results in crowding of the left lateral recess.  There is mild-to-moderate spinal stenosis.     Soft tissues, other: The prevertebral soft tissues are grossly normal.  The airway is patent.    Impression:       1. There is multilevel degenerative disc and facet disease in addition to  postsurgical changes of ACDF from C5 through C7.  There is no fracture.  These findings are present in the setting of a spinal canal which is small on a developmental basis resulting in spinal canal, lateral recess and foraminal stenosis at multiple levels.   2. There is subtle abnormal signal intensity in the cord at the level of C3-4, C5-6, C6-7 which may represent changes of myelomalacia related to chronic spinal stenosis and flattening of the cord.  There is, however, another focus of abnormal signal in the left side of the cord at the level of C2 where there is no significant spinal stenosis.  Focus of demyelination or remote cord ischemic change is not excluded.   3. There are no findings to suggest osseous metastatic disease in the cervical spine.       Electronically signed by: Jose Luis Golden MD   Date: 12/13/2023   Time: 06:19   MRI Thoracic Spine Without Contrast [[id number]] Resulted: 12/12/23 1905   Order Status: Completed Updated: 12/12/23 1907   Narrative:     EXAMINATION:   MRI THORACIC SPINE WITHOUT CONTRAST     CLINICAL HISTORY:   Metastatic disease evaluation;     TECHNIQUE:   Multiplanar, multisequence images were performed through the thoracic spine.  Contrast was not administered.     COMPARISON:   Lumbar spine MRI obtained earlier today.     FINDINGS:   Vertebral column:     The study is mildly motion degraded.  The thoracic vertebral bodies maintain normal height.  There is no pathologic or other fracture.  Alignment is normal.  As seen on comparison lumbar spine MRI, there is abnormal marrow signal intensity within the superior L1 vertebral body on the left as well as in the right L1 transverse process.  There are no other regions of abnormal marrow signal intensity in the thoracic spine other than mild degenerative change anteriorly at the T11-12 level where there is mild endplate osteophyte formation.     Spinal canal, cord, epidural space:     The spinal canal may be borderline small on  a developmental basis.  There is no abnormal epidural soft tissue mass or fluid collection.  There is no acute cord compression.  The cord is grossly normal in signal intensity.     Findings by level: There is no significant disc bulge or focal disc extrusion at any level.  There is mild facet joint arthropathy at several mid to lower thoracic levels.  There is no significant spinal stenosis and there is no cord compression.  Also, no significant foraminal stenosis is suspected.     Soft tissues, other: The prevertebral soft tissues are grossly normal.    Impression:       1. There is no acute or significant abnormality within the thoracic spine.  There is no fracture.  The spinal canal may be borderline small on a developmental basis but there is no significant spinal stenosis and there is no cord compression.   2. As seen on lumbar spine MRI performed earlier today, there is abnormal marrow signal intensity and superior L1 on the left as well as in the right L1 transverse process with imaging findings worrisome for possible osseous metastatic disease.       Electronically signed by: Jose Luis Golden MD   Date: 12/12/2023   Time: 19:05   MRI Lumbar Spine Without Contrast [[id number]] (Abnormal) Resulted: 12/12/23 1352   Order Status: Completed Updated: 12/12/23 6063   Narrative:     EXAMINATION:   MRI LUMBAR SPINE WITHOUT CONTRAST     CLINICAL HISTORY:   Lumbar radiculopathy, trauma;     TECHNIQUE:   Multiplanar, multisequence MR images were acquired from the thoracolumbar junction to the sacrum without the administration of contrast.     COMPARISON:   Chest CT 03/22/2023     FINDINGS:   Alignment: Normal.     Vertebral column: Vertebral body heights are maintained.  Multiple scattered rounded T1 hypointense and T2 stir hyperintense lesions throughout the lumbosacral spine and visualized iliac bones, nonspecific but concerning for metastatic disease or multiple myeloma.  The largest lesion measures 1.8 cm in the  left superior aspect of the L1 vertebral body.  There is an additional lesion within the right L1 transverse process.  Additional lesion within the L4 vertebral body and bilateral S1-S2 sacral alae and S2 vertebral body.  Several lesions within bilateral iliac bones, with the largest measuring 1.3 cm on the left.  Mild disc desiccation and disc space narrowing at L3-4 and L5-S1.     Cord: Normal.  Conus terminates at L1.     Degenerative findings:     T12-L1 through L2-L3: Discs are normal in configuration.  There is no neural foraminal stenosis.  There is no spinal canal stenosis.     L3-L4: Mild disc space narrowing.  Mild diffuse disc bulge.  Mild bilateral facet arthropathy.  Mild right neural foraminal stenosis.   No significant spinal canal stenosis.     L4-L5: Mild diffuse disc bulge.  Mild bilateral facet arthropathy.  Mild bilateral neural foraminal stenosis.  No significant spinal canal stenosis.     L5-S1: Mild disc space narrowing.  Mild diffuse disc bulge.  Mild bilateral facet arthropathy.  Ligamentum flavum thickening.  Mild bilateral neural foraminal stenosis.  There is no spinal canal stenosis.     Paraspinal muscles & soft tissues: No significant abnormalities.    Impression:       1. Multiple scattered osseous lesions throughout the visualized lumbosacral spine and iliac bones, nonspecific, but given findings on comparison chest CT from 03/22/2023 are concerning for metastatic disease.  Further evaluation recommended.   2. Mild multilevel degenerative change of the lumbar spine, as detailed above, most pronounced at L3-4 through L5-S1 and resulting in mild neural foraminal stenosis.  No significant spinal canal stenosis at any lumbar level.   This report was flagged in Epic as abnormal.       Electronically signed by: Jj Serna   Date: 12/12/2023   Time: 13:57   MRI Brain Without Contrast [[id number]] Resulted: 12/12/23 1055   Order Status: Completed Updated: 12/12/23 1057   Narrative:      EXAMINATION:   MRI BRAIN WITHOUT CONTRAST     CLINICAL HISTORY:   Neuro deficit, acute, stroke suspected;.     TECHNIQUE:   Multiplanar multisequence MR imaging of the brain was performed without the administration of intravenous contrast.     COMPARISON:   CT head 12/11/2023     FINDINGS:   Ventricles and sulci are normal in size for age without evidence of hydrocephalus.     Mild scattered T2/FLAIR hyperintense signal within the bilateral periventricular, deep, subcortical and pontine white matter, nonspecific and may reflect sequelae of chronic microvascular ischemic change. Small foci of encephalomalacia within bilateral thalami compatible with chronic lacunar infarcts.  Diffusion-weighted images demonstrate no evidence of an acute infarct.   Small punctate foci susceptibility within bilateral thalami suggestive of chronic microhemorrhages.     No mass effect or midline shift.     No extra-axial blood or fluid collections.     Normal vascular flow voids are preserved.     Bone marrow signal intensity is normal. Scattered mild mucosal membrane thickening in the paranasal sinuses, most pronounced in the right maxillary sinus with probable superimposed mucous retention cyst.  Partial opacification of the right mastoid air cells.  Orbits are unremarkable.    Impression:       1. No evidence of an acute intracranial abnormality.   2. Mild generalized cerebral volume loss and scattered T2/FLAIR hyperintense signal within the supratentorial white matter, nonspecific but likely reflecting sequelae of chronic microvascular ischemic change.   3. Chronic bilateral thalamic lacunar infarcts.       Electronically signed by: Jj Serna   Date: 12/12/2023   Time: 10:55   X-Ray Chest AP Portable [[id number]] Resulted: 12/12/23 0831   Order Status: Completed Updated: 12/12/23 0834   Narrative:     EXAMINATION:   XR CHEST AP PORTABLE     CLINICAL HISTORY:   Dizziness and giddiness     TECHNIQUE:   Single frontal view of the  chest was performed.     COMPARISON:   12/04/2023     FINDINGS:   Stable cardiomediastinal silhouette.  Appear mildly enlarged as before.  Right-sided internal jugular venous catheter remains in place with the distal tip at the level of the caval atrial junction.  Prominent lung markings suggesting mild pulmonary vascular distention with this is decreased compared to the prior exam.  No pleural effusion    Impression:       Improvement in the appearance of the chest with decrease of lung infiltrates suggesting improving pulmonary edema       Electronically signed by: Angy Beckford MD   Date: 12/12/2023   Time: 08:31   CT Head Without Contrast [[id number]] Resulted: 12/12/23 0746   Order Status: Completed Updated: 12/12/23 0748   Narrative:     EXAMINATION:   CT HEAD WITHOUT CONTRAST     CLINICAL HISTORY:   Dizziness, persistent/recurrent, cardiac or vascular cause suspected;     TECHNIQUE:   Low dose axial images were obtained through the head.  Coronal and sagittal reformations were also performed. Contrast was not administered.     COMPARISON:   02/22/2023     FINDINGS:   Ventricles and sulci are normal in size for age without evidence of hydrocephalus.     Mild scattered hypoattenuation within the supratentorial white matter, nonspecific but probably reflecting sequelae of chronic microvascular ischemic change.   No evidence of acute parenchymal hemorrhage, edema, significant mass effect or major vascular distribution infarct.     No extra-axial blood or fluid collections.     No displaced calvarial fracture.     The mastoid air cells and visualized paranasal sinuses are essentially clear.     Calcific atherosclerosis of the internal carotid and vertebral arteries at the skull base.    Impression:       No evidence of an acute intracranial abnormality.     Final read.       Electronically signed by: Jj Serna   Date: 12/12/2023   Time: 07:46     Pending Diagnostic Studies:       None            Medications:  Reconciled Home Medications:      Medication List        CHANGE how you take these medications      ondansetron 4 MG Tbdl  Commonly known as: ZOFRAN-ODT  Take 2 tablets (8 mg total) by mouth every 8 (eight) hours as needed (nausea vomiting).  What changed:   how much to take  when to take this  reasons to take this            CONTINUE taking these medications      aspirin 81 MG Chew  Take 1 tablet (81 mg total) by mouth once daily.     hydrALAZINE 100 MG tablet  Commonly known as: APRESOLINE  Take 1 tablet (100 mg total) by mouth 3 (three) times daily.     MIRCERA INJ  Inject 50 mcg into the skin every 28 days.     NIFEdipine 90 MG (OSM) 24 hr tablet  Commonly known as: PROCARDIA XL  Take 1 tablet (90 mg total) by mouth once daily.     promethazine 12.5 MG Tab  Commonly known as: PHENERGAN  Take 1 tablet (12.5 mg total) by mouth every 6 (six) hours as needed (nausea and vomiting).     sevelamer carbonate 800 mg Tab  Commonly known as: RENVELA  Take 1 tablet (800 mg total) by mouth 3 (three) times daily with meals.     torsemide 20 MG Tab  Commonly known as: DEMADEX  Take 1 tablet by mouth once daily.            STOP taking these medications      atorvastatin 40 MG tablet  Commonly known as: LIPITOR     baclofen 5 mg Tab tablet  Commonly known as: LIORESAL     bethanechol 10 MG Tab  Commonly known as: URECHOLINE     chlorproMAZINE 25 MG tablet  Commonly known as: THORAZINE     gabapentin 100 MG capsule  Commonly known as: NEURONTIN     GAVILYTE-C 240-22.72-6.72 -5.84 gram Solr  Generic drug: polyethylene glycol     heparin (porcine) 100 units 100 unit/100 mL (1 unit/mL) Solp in sodium chloride 0.45 % 100 mL infusion     pantoprazole 40 MG tablet  Commonly known as: PROTONIX     VELPHORO 500 mg Chew  Generic drug: sucroferric oxyhydroxide            ASK your doctor about these medications      amitriptyline 10 MG tablet  Commonly known as: ELAVIL  Take 1 tablet (10 mg total) by mouth every evening.               Indwelling Lines/Drains at time of discharge:   Lines/Drains/Airways       Central Venous Catheter Line  Duration                  Hemodialysis Catheter right subclavian -- days                    Time spent on the discharge of patient: 36 minutes         Leticia Isabel NP  Department of Hospital Medicine  [facility]/Surg

## 2023-12-18 NOTE — PT/OT/SLP PROGRESS
Physical Therapy      Patient Name:  Catalino Mcfadden   MRN:  [id number]    Patient not seen today secondary to Dialysis. Will follow-up 12/19/23.

## 2023-12-18 NOTE — PLAN OF CARE
Problem: Adult Inpatient Plan of Care  Goal: Plan of Care Review  12/18/2023 0518 by Stephany Anderson RN  Outcome: Ongoing, Progressing  12/18/2023 0137 by Stephany Anderson RN  Outcome: Ongoing, Progressing  Goal: Readiness for Transition of Care  12/18/2023 0518 by Stephany Anderson RN  Outcome: Ongoing, Progressing  12/18/2023 0137 by Stephany Anderson RN  Outcome: Ongoing, Progressing     Problem: Diabetes Comorbidity  Goal: Blood Glucose Level Within Targeted Range  12/18/2023 0518 by Stephany Anderson RN  Outcome: Ongoing, Progressing  12/18/2023 0137 by Stephany Anderson RN  Outcome: Ongoing, Progressing     Problem: Infection  Goal: Absence of Infection Signs and Symptoms  12/18/2023 0518 by Stephany Anderson RN  Outcome: Ongoing, Progressing  12/18/2023 0137 by Stephany Anderson RN  Outcome: Ongoing, Progressing     Problem: Skin Injury Risk Increased  Goal: Skin Health and Integrity  12/18/2023 0518 by Stephany Anderson RN  Outcome: Ongoing, Progressing  12/18/2023 0137 by Stephany Anderson RN  Outcome: Ongoing, Progressing     Problem: Fall Injury Risk  Goal: Absence of Fall and Fall-Related Injury  Outcome: Ongoing, Progressing     Problem: Device-Related Complication Risk (Hemodialysis)  Goal: Safe, Effective Therapy Delivery  12/18/2023 0518 by Stephany Anderson RN  Outcome: Ongoing, Progressing  12/18/2023 0137 by Stephany Anderson RN  Outcome: Ongoing, Progressing     Problem: Hemodynamic Instability (Hemodialysis)  Goal: Effective Tissue Perfusion  Outcome: Ongoing, Progressing     Problem: Infection (Hemodialysis)  Goal: Absence of Infection Signs and Symptoms  12/18/2023 0518 by Stephany Anderson RN  Outcome: Ongoing, Progressing  12/18/2023 0137 by Stephany Anderson RN  Outcome: Ongoing, Progressing     Problem: Coping Ineffective  Goal: Effective Coping  12/18/2023 0518 by Stephany Anderson RN  Outcome: Ongoing, Progressing  12/18/2023 0137 by Stephany Anderson RN  Outcome: Ongoing,  Progressing

## 2023-12-18 NOTE — PLAN OF CARE
Problem: Adult Inpatient Plan of Care  Goal: Plan of Care Review  Outcome: Ongoing, Progressing     Problem: Adult Inpatient Plan of Care  Goal: Optimal Comfort and Wellbeing  Outcome: Ongoing, Progressing     POC reviewed with pt, verbalized understanding. Pt rested in between recliner and bed this shift. No complaints of pain. Complaints of discomfort managed with PRN medication, relief obtained. NAD noted. Worked well with PT. New IV CDI. BG monitored closely, no coverage necessary. Safety maintained. Needs attended to.

## 2023-12-18 NOTE — PLAN OF CARE
AVS sent to [facility] for review          12/18/23 2788   Post-Acute Status   Post-Acute Authorization Placement   Post-Acute Placement Status Pending post-acute provider review/more information requested

## 2023-12-18 NOTE — PLAN OF CARE
Report can be called to  [phone]  Transportation is set up for 1 pm    Pt is clear for dc from [facility]. Discharging to [facility]       12/18/23 1044   Final Note   Assessment Type Final Discharge Note   Anticipated Discharge Disposition Rehab   Hospital Resources/Appts/Education Provided Appointments scheduled and added to AVS   Post-Acute Status   Post-Acute Authorization Placement   Post-Acute Placement Status Set-up Complete/Auth obtained

## 2023-12-19 LAB
AMPHIPHYSIN AB TITR SER: NEGATIVE {TITER}
ANNOTATION COMMENT IMP: NORMAL
CV2 IGG TITR SER: NEGATIVE {TITER}
GLIAL NUC TYPE 1 AB TITR SER: NEGATIVE {TITER}
HU1 AB TITR SER: NEGATIVE {TITER}
HU2 AB TITR SER IF: NEGATIVE {TITER}
HU3 AB TITR SER: NEGATIVE {TITER}
IMMUNOLOGIST REVIEW: NORMAL
PCA-1 AB TITR SER: NEGATIVE {TITER}
PCA-TR AB TITR SER: NEGATIVE {TITER}
PURKINJE CELL CYTOPLASMIC AB TYPE2: NEGATIVE
VGCC-P/Q BIND AB SER-SCNC: 0 NMOL/L
VGKC AB SER-SCNC: 0.01 NMOL/L

## 2023-12-20 LAB
IGA SERPL-MCNC: 713 MG/DL (ref 90–386)
IGG SERPL-MCNC: 1458 MG/DL (ref 603–1613)
IGM SERPL-MCNC: 49 MG/DL (ref 20–172)
PROT PATTERN SERPL IFE-IMP: ABNORMAL

## 2023-12-28 DIAGNOSIS — I10 ESSENTIAL HYPERTENSION: Primary | Chronic | ICD-10-CM

## 2023-12-28 DIAGNOSIS — R06.6 INTRACTABLE HICCUPS: ICD-10-CM

## 2024-01-08 ENCOUNTER — OFFICE VISIT (OUTPATIENT)
Dept: PAIN MEDICINE | Facility: CLINIC | Age: 59
End: 2024-01-08
Payer: COMMERCIAL

## 2024-01-08 VITALS
BODY MASS INDEX: 27.17 KG/M2 | HEIGHT: 73 IN | WEIGHT: 205 LBS | RESPIRATION RATE: 18 BRPM | HEART RATE: 83 BPM | DIASTOLIC BLOOD PRESSURE: 82 MMHG | TEMPERATURE: 98 F | SYSTOLIC BLOOD PRESSURE: 180 MMHG | OXYGEN SATURATION: 100 %

## 2024-01-08 DIAGNOSIS — R06.6 INTRACTABLE HICCUPS: ICD-10-CM

## 2024-01-08 DIAGNOSIS — R06.6 INTRACTABLE HICCUPS: Primary | ICD-10-CM

## 2024-01-08 DIAGNOSIS — J39.8 TRACHEOBRONCHOMALACIA: Primary | ICD-10-CM

## 2024-01-08 PROCEDURE — 3079F DIAST BP 80-89 MM HG: CPT | Mod: CPTII,S$GLB,, | Performed by: ANESTHESIOLOGY

## 2024-01-08 PROCEDURE — 3077F SYST BP >= 140 MM HG: CPT | Mod: CPTII,S$GLB,, | Performed by: ANESTHESIOLOGY

## 2024-01-08 PROCEDURE — 1160F RVW MEDS BY RX/DR IN RCRD: CPT | Mod: CPTII,S$GLB,, | Performed by: ANESTHESIOLOGY

## 2024-01-08 PROCEDURE — 99204 OFFICE O/P NEW MOD 45 MIN: CPT | Mod: S$GLB,,, | Performed by: ANESTHESIOLOGY

## 2024-01-08 PROCEDURE — 99999 PR PBB SHADOW E&M-EST. PATIENT-LVL IV: CPT | Mod: PBBFAC,,, | Performed by: ANESTHESIOLOGY

## 2024-01-08 PROCEDURE — 3008F BODY MASS INDEX DOCD: CPT | Mod: CPTII,S$GLB,, | Performed by: ANESTHESIOLOGY

## 2024-01-08 PROCEDURE — 1159F MED LIST DOCD IN RCRD: CPT | Mod: CPTII,S$GLB,, | Performed by: ANESTHESIOLOGY

## 2024-01-08 NOTE — PROGRESS NOTES
Chronic Pain - New Consult    Referring Physician: Emil Lopez MD    Chief Complaint:   Chief Complaint   Patient presents with    Chest Pain        SUBJECTIVE:    Catalino Mcfadden with PMHx DM and ESRD presents to the clinic for the evaluation of persistent hiccups.   Patient has intractable hiccups non responsive to medications. PCP was interested in possible option of nerve block for the hiccups, and if that isn't possible insight into a specialist you know that might due that nerve block.  Hiccups for 2 years interfering with sleeping, communication, eating, inspiration. He has failed all oral meds. He has seen GI with scope which did not elucidate etiological origin.     MRI Lumbar spine performed December 2023, demonstrating abnormal marrow signal intensity in the superior L1 body on the left as well as in the right L1 transverse process with imaging findings worrisome for possible osseous metastatic disease.     Pain Disability Index Review:      1/8/2024    10:21 AM   Last 3 PDI Scores   Pain Disability Index (PDI) 0       Pain Medications:    Pain Procedures: None    Imaging: reviewed    Past Medical History:   Diagnosis Date    Diabetes mellitus, type 2     Diabetic foot ulcer associated with diabetes mellitus due to underlying condition 07/16/2020    ESRD on hemodialysis     Hyperlipidemia     Hypertension     Obese      Past Surgical History:   Procedure Laterality Date    AV FISTULA PLACEMENT Left 07/06/2023    Procedure: CREATION, AV FISTULA;  Surgeon: Daniel Poon MD;  Location: [facility] OR;  Service: Vascular;  Laterality: Left;  RN PREOP 6/28/2023  LABS DAY OF SURGERY    BONE BIOPSY Left 07/17/2020    Procedure: BIOPSY, BONE;  Surgeon: Verona Whitmore DPM;  Location: [facility] OR;  Service: Podiatry;  Laterality: Left;    CERVICAL DISC SURGERY  07/11/2016    DEBRIDEMENT Left 07/17/2020    Procedure: DEBRIDEMENT;  Surgeon: Verona Whitmore DPM;  Location: [facility] OR;  Service: Podiatry;  Laterality:  Left;    DEBRIDEMENT OF FOOT Right     toes & plantar surface    ESOPHAGEAL MANOMETRY WITH MEASUREMENT OF IMPEDANCE N/A 03/27/2023    Procedure: MANOMETRY, ESOPHAGUS, WITH IMPEDANCE MEASUREMENT;  Surgeon: Roopa Pérez MD;  Location: [facility] (4TH FLR);  Service: Endoscopy;  Laterality: N/A;  Belching and rumination protocol please  instructions via portal - sm    ESOPHAGOGASTRODUODENOSCOPY N/A 12/16/2022    Procedure: EGD (ESOPHAGOGASTRODUODENOSCOPY);  Surgeon: Eren Francois MD;  Location: [facility] ENDO;  Service: Endoscopy;  Laterality: N/A;  Pt. on Dialysis. K+ ordered prior to procedure.EC    ESOPHAGOGASTRODUODENOSCOPY N/A 12/5/2023    Procedure: EGD (ESOPHAGOGASTRODUODENOSCOPY);  Surgeon: Kash Johnston MD;  Location: [facility] ENDO;  Service: Endoscopy;  Laterality: N/A;    FRACTURE SURGERY Right     medial ankle, metal plate present    INSERTION OF TUNNELED CENTRAL VENOUS CATHETER (CVC) WITH SUBCUTANEOUS PORT N/A 06/11/2021    Procedure: TUNNEL CATH INSERTION WITHOUT PORT;  Surgeon: Jose Manuel Diagnostic Provider;  Location: [facility] OR;  Service: Radiology;  Laterality: N/A;  11AM START---PHONE PREOP 6/10/21---COVID POSTIVE ON 7/2020---NO S/S    left leg orthopedic surgery Left     UPPER GASTROINTESTINAL ENDOSCOPY       Social History     Socioeconomic History    Marital status: Other    Number of children: 1   Tobacco Use    Smoking status: Never    Smokeless tobacco: Never   Substance and Sexual Activity    Alcohol use: Not Currently     Comment: occ rare beer    Drug use: No    Sexual activity: Not Currently   Social History Narrative    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     Social Determinants of Health     Financial Resource Strain: Low Risk  (9/26/2022)    Overall Financial Resource Strain (CARDIA)     Difficulty of Paying Living Expenses: Not very hard   Food Insecurity: No Food Insecurity (9/26/2022)    Hunger Vital Sign     Worried About Running Out of Food in the Last Year: Never true     Ran Out of Food in  the Last Year: Never true   Transportation Needs: No Transportation Needs (9/26/2022)    PRAPARE - Transportation     Lack of Transportation (Medical): No     Lack of Transportation (Non-Medical): No   Physical Activity: Unknown (9/26/2022)    Exercise Vital Sign     Days of Exercise per Week: Patient declined     Minutes of Exercise per Session: Patient declined   Stress: Stress Concern Present (9/26/2022)    [employer] [place] [employer] - Occupational Stress Questionnaire     Feeling of Stress : To some extent   Social Connections: Moderately Integrated (9/26/2022)    Social Connection and Isolation Panel [NHANES]     Frequency of Communication with Friends and Family: Three times a week     Frequency of Social Gatherings with Friends and Family: Three times a week     Attends Jainism Services: 1 to 4 times per year     Active Member of Clubs or Organizations: No     Attends Club or Organization Meetings: Never     Marital Status:    Housing Stability: Low Risk  (9/26/2022)    Housing Stability Vital Sign     Unable to Pay for Housing in the Last Year: No     Number of Places Lived in the Last Year: 1     Unstable Housing in the Last Year: No     Family History   Adopted: Yes   Problem Relation Age of Onset    Heart disease Father     Colon cancer Neg Hx     Esophageal cancer Neg Hx     Colon polyps Neg Hx     Stomach cancer Neg Hx        Review of patient's allergies indicates:  No Known Allergies    Current Outpatient Medications   Medication Sig    aspirin 81 MG Chew Take 1 tablet (81 mg total) by mouth once daily.    hydrALAZINE (APRESOLINE) 100 MG tablet Take 1 tablet (100 mg total) by mouth 3 (three) times daily.    NIFEdipine (PROCARDIA XL) 90 MG (OSM) 24 hr tablet Take 1 tablet (90 mg total) by mouth once daily.    ondansetron (ZOFRAN-ODT) 4 MG TbDL Take 2 tablets (8 mg total) by mouth every 8 (eight) hours as needed (nausea vomiting).    sevelamer carbonate (RENVELA) 800 mg Tab Take 1  "tablet (800 mg total) by mouth 3 (three) times daily with meals.    amitriptyline (ELAVIL) 10 MG tablet Take 1 tablet (10 mg total) by mouth every evening. (Patient not taking: Reported on 12/12/2023)    methoxy peg-epoetin beta (MIRCERA INJ) Inject 50 mcg into the skin every 28 days.    promethazine (PHENERGAN) 12.5 MG Tab Take 1 tablet (12.5 mg total) by mouth every 6 (six) hours as needed (nausea and vomiting).    torsemide (DEMADEX) 20 MG Tab Take 1 tablet by mouth once daily.     No current facility-administered medications for this visit.       REVIEW OF SYSTEMS:    GENERAL:  No weight loss, malaise or fevers.  HEENT:  Negative for frequent or significant headaches.  NECK:  Negative for lumps, goiter, pain and significant neck swelling.  RESPIRATORY:  Negative for cough, wheezing or shortness of breath.  CARDIOVASCULAR:  Negative for chest pain, leg swelling or palpitations.  GI:  Negative for abdominal discomfort, blood in stools or black stools or change in bowel habits.  MUSCULOSKELETAL:  See HPI.  SKIN:  Negative for lesions, rash, and itching.  PSYCH:  Negative for sleep disturbance, mood disorder and recent psychosocial stressors.  HEMATOLOGY/LYMPHOLOGY:  Negative for prolonged bleeding, bruising easily or swollen nodes.  NEURO:   No history of headaches, syncope, paralysis, seizures or tremors.  All other reviewed and negative other than HPI.    OBJECTIVE:    BP (!) 180/82   Pulse 83   Temp 98 °F (36.7 °C)   Resp 18   Ht 6' 1" (1.854 m)   Wt 93 kg (205 lb 0.4 oz)   SpO2 100%   BMI 27.05 kg/m²     PHYSICAL EXAMINATION:    General appearance: Well appearing, in no acute distress, alert and oriented x3.  Psych:  Mood and affect appropriate.  Skin: Skin color, texture, turgor normal, no rashes or lesions, in both upper and lower body.  Head/face:  Normocephalic, atraumatic. No palpable lymph nodes.  Neck: No pain to palpation over the cervical paraspinous muscles. Spurling Negative. No pain with neck " flexion, extension, or lateral flexion.   Cor: RRR  Pulm: CTA  GI:  Soft and non-tender.  Back: Straight leg raising in the sitting and supine positions is negative to radicular pain. No pain to palpation over the spine or costovertebral angles. Normal range of motion without pain reproduction.  Extremities: Peripheral joint ROM is full and pain free without obvious instability or laxity in all four extremities. No deformities, edema, or skin discoloration. Good capillary refill.  Musculoskeletal: Shoulder, hip, sacroiliac and knee provocative maneuvers are negative. Bilateral upper and lower extremity strength is normal and symmetric.  No atrophy or tone abnormalities are noted.  Neuro: Bilateral upper and lower extremity coordination and muscle stretch reflexes are physiologic and symmetric.  Plantar response are downgoing. No loss of sensation is noted.  Gait: normal.    ASSESSMENT: 58 y.o. year old male with intractable hiccups x 2 years interfering substantially with quality of life     1. Tracheobronchomalacia        2. Intractable hiccups  Ambulatory referral/consult to Pain Clinic    Procedure Order to Pain Management            PLAN:     - Schedule for a Stellate Ganglion Block on the right/left to help with the neuropathic pain.  - RTC 2 weeks post injection      The above plan and management options were discussed at length with patient. Patient is in agreement with the above and verbalized understanding. It will be communicated with the referring physician via electronic record, fax, or mail.      I spent a total of 30 minutes on the day of the visit.  This includes face to face time and non-face to face time preparing to see the patient by reviewing previous labs/imaging, obtaining and/or reviewing separately obtained history, documenting clinical information in the electronic or other health record, independently interpreting results and communicating results to the  patient/family/caregiver.      Gokul Gonzalez  01/08/2024

## 2024-01-08 NOTE — H&P (VIEW-ONLY)
Chronic Pain - New Consult    Referring Physician: Emil Lopez MD    Chief Complaint:   Chief Complaint   Patient presents with    Chest Pain        SUBJECTIVE:    Catalino Mcfadden with PMHx DM and ESRD presents to the clinic for the evaluation of persistent hiccups.   Patient has intractable hiccups non responsive to medications. PCP was interested in possible option of nerve block for the hiccups, and if that isn't possible insight into a specialist you know that might due that nerve block.  Hiccups for 2 years interfering with sleeping, communication, eating, inspiration. He has failed all oral meds. He has seen GI with scope which did not elucidate etiological origin.     MRI Lumbar spine performed December 2023, demonstrating abnormal marrow signal intensity in the superior L1 body on the left as well as in the right L1 transverse process with imaging findings worrisome for possible osseous metastatic disease.     Pain Disability Index Review:      1/8/2024    10:21 AM   Last 3 PDI Scores   Pain Disability Index (PDI) 0       Pain Medications:    Pain Procedures: None    Imaging: reviewed    Past Medical History:   Diagnosis Date    Diabetes mellitus, type 2     Diabetic foot ulcer associated with diabetes mellitus due to underlying condition 07/16/2020    ESRD on hemodialysis     Hyperlipidemia     Hypertension     Obese      Past Surgical History:   Procedure Laterality Date    AV FISTULA PLACEMENT Left 07/06/2023    Procedure: CREATION, AV FISTULA;  Surgeon: Daniel Poon MD;  Location: [facility] OR;  Service: Vascular;  Laterality: Left;  RN PREOP 6/28/2023  LABS DAY OF SURGERY    BONE BIOPSY Left 07/17/2020    Procedure: BIOPSY, BONE;  Surgeon: Verona Whitmore DPM;  Location: [facility] OR;  Service: Podiatry;  Laterality: Left;    CERVICAL DISC SURGERY  07/11/2016    DEBRIDEMENT Left 07/17/2020    Procedure: DEBRIDEMENT;  Surgeon: Verona Whitmore DPM;  Location: [facility] OR;  Service: Podiatry;  Laterality:  Left;    DEBRIDEMENT OF FOOT Right     toes & plantar surface    ESOPHAGEAL MANOMETRY WITH MEASUREMENT OF IMPEDANCE N/A 03/27/2023    Procedure: MANOMETRY, ESOPHAGUS, WITH IMPEDANCE MEASUREMENT;  Surgeon: Roopa Pérez MD;  Location: [facility] (4TH FLR);  Service: Endoscopy;  Laterality: N/A;  Belching and rumination protocol please  instructions via portal - sm    ESOPHAGOGASTRODUODENOSCOPY N/A 12/16/2022    Procedure: EGD (ESOPHAGOGASTRODUODENOSCOPY);  Surgeon: Eren Francois MD;  Location: [facility] ENDO;  Service: Endoscopy;  Laterality: N/A;  Pt. on Dialysis. K+ ordered prior to procedure.EC    ESOPHAGOGASTRODUODENOSCOPY N/A 12/5/2023    Procedure: EGD (ESOPHAGOGASTRODUODENOSCOPY);  Surgeon: Kash Johnston MD;  Location: [facility] ENDO;  Service: Endoscopy;  Laterality: N/A;    FRACTURE SURGERY Right     medial ankle, metal plate present    INSERTION OF TUNNELED CENTRAL VENOUS CATHETER (CVC) WITH SUBCUTANEOUS PORT N/A 06/11/2021    Procedure: TUNNEL CATH INSERTION WITHOUT PORT;  Surgeon: Jose Manuel Diagnostic Provider;  Location: [facility] OR;  Service: Radiology;  Laterality: N/A;  11AM START---PHONE PREOP 6/10/21---COVID POSTIVE ON 7/2020---NO S/S    left leg orthopedic surgery Left     UPPER GASTROINTESTINAL ENDOSCOPY       Social History     Socioeconomic History    Marital status: Other    Number of children: 1   Tobacco Use    Smoking status: Never    Smokeless tobacco: Never   Substance and Sexual Activity    Alcohol use: Not Currently     Comment: occ rare beer    Drug use: No    Sexual activity: Not Currently   Social History Narrative    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     Social Determinants of Health     Financial Resource Strain: Low Risk  (9/26/2022)    Overall Financial Resource Strain (CARDIA)     Difficulty of Paying Living Expenses: Not very hard   Food Insecurity: No Food Insecurity (9/26/2022)    Hunger Vital Sign     Worried About Running Out of Food in the Last Year: Never true     Ran Out of Food in  the Last Year: Never true   Transportation Needs: No Transportation Needs (9/26/2022)    PRAPARE - Transportation     Lack of Transportation (Medical): No     Lack of Transportation (Non-Medical): No   Physical Activity: Unknown (9/26/2022)    Exercise Vital Sign     Days of Exercise per Week: Patient declined     Minutes of Exercise per Session: Patient declined   Stress: Stress Concern Present (9/26/2022)    [employer] - Occupational Stress Questionnaire     Feeling of Stress : To some extent   Social Connections: Moderately Integrated (9/26/2022)    Social Connection and Isolation Panel [NHANES]     Frequency of Communication with Friends and Family: Three times a week     Frequency of Social Gatherings with Friends and Family: Three times a week     Attends [employer] Services: 1 to 4 times per year     Active Member of Clubs or Organizations: No     Attends Club or Organization Meetings: Never     Marital Status:    Housing Stability: Low Risk  (9/26/2022)    Housing Stability Vital Sign     Unable to Pay for Housing in the Last Year: No     Number of Places Lived in the Last Year: 1     Unstable Housing in the Last Year: No     Family History   Adopted: Yes   Problem Relation Age of Onset    Heart disease Father     Colon cancer Neg Hx     Esophageal cancer Neg Hx     Colon polyps Neg Hx     Stomach cancer Neg Hx        Review of patient's allergies indicates:  No Known Allergies    Current Outpatient Medications   Medication Sig    aspirin 81 MG Chew Take 1 tablet (81 mg total) by mouth once daily.    hydrALAZINE (APRESOLINE) 100 MG tablet Take 1 tablet (100 mg total) by mouth 3 (three) times daily.    NIFEdipine (PROCARDIA XL) 90 MG (OSM) 24 hr tablet Take 1 tablet (90 mg total) by mouth once daily.    ondansetron (ZOFRAN-ODT) 4 MG TbDL Take 2 tablets (8 mg total) by mouth every 8 (eight) hours as needed (nausea vomiting).    sevelamer carbonate (RENVELA) 800 mg Tab Take 1  "tablet (800 mg total) by mouth 3 (three) times daily with meals.    amitriptyline (ELAVIL) 10 MG tablet Take 1 tablet (10 mg total) by mouth every evening. (Patient not taking: Reported on 12/12/2023)    methoxy peg-epoetin beta (MIRCERA INJ) Inject 50 mcg into the skin every 28 days.    promethazine (PHENERGAN) 12.5 MG Tab Take 1 tablet (12.5 mg total) by mouth every 6 (six) hours as needed (nausea and vomiting).    torsemide (DEMADEX) 20 MG Tab Take 1 tablet by mouth once daily.     No current facility-administered medications for this visit.       REVIEW OF SYSTEMS:    GENERAL:  No weight loss, malaise or fevers.  HEENT:  Negative for frequent or significant headaches.  NECK:  Negative for lumps, goiter, pain and significant neck swelling.  RESPIRATORY:  Negative for cough, wheezing or shortness of breath.  CARDIOVASCULAR:  Negative for chest pain, leg swelling or palpitations.  GI:  Negative for abdominal discomfort, blood in stools or black stools or change in bowel habits.  MUSCULOSKELETAL:  See HPI.  SKIN:  Negative for lesions, rash, and itching.  PSYCH:  Negative for sleep disturbance, mood disorder and recent psychosocial stressors.  HEMATOLOGY/LYMPHOLOGY:  Negative for prolonged bleeding, bruising easily or swollen nodes.  NEURO:   No history of headaches, syncope, paralysis, seizures or tremors.  All other reviewed and negative other than HPI.    OBJECTIVE:    BP (!) 180/82   Pulse 83   Temp 98 °F (36.7 °C)   Resp 18   Ht 6' 1" (1.854 m)   Wt 93 kg (205 lb 0.4 oz)   SpO2 100%   BMI 27.05 kg/m²     PHYSICAL EXAMINATION:    General appearance: Well appearing, in no acute distress, alert and oriented x3.  Psych:  Mood and affect appropriate.  Skin: Skin color, texture, turgor normal, no rashes or lesions, in both upper and lower body.  Head/face:  Normocephalic, atraumatic. No palpable lymph nodes.  Neck: No pain to palpation over the cervical paraspinous muscles. Spurling Negative. No pain with neck " flexion, extension, or lateral flexion.   Cor: RRR  Pulm: CTA  GI:  Soft and non-tender.  Back: Straight leg raising in the sitting and supine positions is negative to radicular pain. No pain to palpation over the spine or costovertebral angles. Normal range of motion without pain reproduction.  Extremities: Peripheral joint ROM is full and pain free without obvious instability or laxity in all four extremities. No deformities, edema, or skin discoloration. Good capillary refill.  Musculoskeletal: Shoulder, hip, sacroiliac and knee provocative maneuvers are negative. Bilateral upper and lower extremity strength is normal and symmetric.  No atrophy or tone abnormalities are noted.  Neuro: Bilateral upper and lower extremity coordination and muscle stretch reflexes are physiologic and symmetric.  Plantar response are downgoing. No loss of sensation is noted.  Gait: normal.    ASSESSMENT: 58 y.o. year old male with intractable hiccups x 2 years interfering substantially with quality of life     1. Tracheobronchomalacia        2. Intractable hiccups  Ambulatory referral/consult to Pain Clinic    Procedure Order to Pain Management            PLAN:     - Schedule for a Stellate Ganglion Block on the right/left to help with the neuropathic pain.  - RTC 2 weeks post injection      The above plan and management options were discussed at length with patient. Patient is in agreement with the above and verbalized understanding. It will be communicated with the referring physician via electronic record, fax, or mail.      I spent a total of 30 minutes on the day of the visit.  This includes face to face time and non-face to face time preparing to see the patient by reviewing previous labs/imaging, obtaining and/or reviewing separately obtained history, documenting clinical information in the electronic or other health record, independently interpreting results and communicating results to the  patient/family/caregiver.      Gokul Gonzalez  01/08/2024

## 2024-01-09 ENCOUNTER — PATIENT MESSAGE (OUTPATIENT)
Dept: PAIN MEDICINE | Facility: CLINIC | Age: 59
End: 2024-01-09
Payer: COMMERCIAL

## 2024-01-09 ENCOUNTER — PATIENT MESSAGE (OUTPATIENT)
Dept: HEMATOLOGY/ONCOLOGY | Facility: CLINIC | Age: 59
End: 2024-01-09
Payer: COMMERCIAL

## 2024-01-10 ENCOUNTER — PATIENT MESSAGE (OUTPATIENT)
Dept: PAIN MEDICINE | Facility: OTHER | Age: 59
End: 2024-01-10
Payer: COMMERCIAL

## 2024-01-10 ENCOUNTER — OFFICE VISIT (OUTPATIENT)
Dept: HEMATOLOGY/ONCOLOGY | Facility: CLINIC | Age: 59
End: 2024-01-10
Payer: COMMERCIAL

## 2024-01-10 ENCOUNTER — TELEPHONE (OUTPATIENT)
Dept: SURGERY | Facility: CLINIC | Age: 59
End: 2024-01-10
Payer: COMMERCIAL

## 2024-01-10 ENCOUNTER — OFFICE VISIT (OUTPATIENT)
Dept: FAMILY MEDICINE | Facility: CLINIC | Age: 59
End: 2024-01-10
Payer: COMMERCIAL

## 2024-01-10 VITALS
OXYGEN SATURATION: 98 % | SYSTOLIC BLOOD PRESSURE: 150 MMHG | TEMPERATURE: 99 F | DIASTOLIC BLOOD PRESSURE: 76 MMHG | RESPIRATION RATE: 16 BRPM | HEIGHT: 73 IN | WEIGHT: 192 LBS | HEART RATE: 94 BPM | BODY MASS INDEX: 25.45 KG/M2

## 2024-01-10 VITALS
HEART RATE: 98 BPM | BODY MASS INDEX: 25.19 KG/M2 | OXYGEN SATURATION: 99 % | DIASTOLIC BLOOD PRESSURE: 87 MMHG | WEIGHT: 190.94 LBS | SYSTOLIC BLOOD PRESSURE: 169 MMHG | TEMPERATURE: 99 F

## 2024-01-10 DIAGNOSIS — L97.422 DIABETIC ULCER OF LEFT MIDFOOT ASSOCIATED WITH TYPE 2 DIABETES MELLITUS, WITH FAT LAYER EXPOSED: ICD-10-CM

## 2024-01-10 DIAGNOSIS — N18.5 TYPE 2 DIABETES MELLITUS WITH STAGE 5 CHRONIC KIDNEY DISEASE: ICD-10-CM

## 2024-01-10 DIAGNOSIS — D63.1 ANEMIA DUE TO CHRONIC KIDNEY DISEASE, ON CHRONIC DIALYSIS: ICD-10-CM

## 2024-01-10 DIAGNOSIS — I10 ESSENTIAL HYPERTENSION: Primary | Chronic | ICD-10-CM

## 2024-01-10 DIAGNOSIS — L97.426 DIABETIC ULCER OF LEFT MIDFOOT ASSOCIATED WITH TYPE 2 DIABETES MELLITUS, WITH BONE INVOLVEMENT WITHOUT EVIDENCE OF NECROSIS: ICD-10-CM

## 2024-01-10 DIAGNOSIS — E11.621 DIABETIC ULCER OF LEFT MIDFOOT ASSOCIATED WITH TYPE 2 DIABETES MELLITUS, WITH BONE INVOLVEMENT WITHOUT EVIDENCE OF NECROSIS: ICD-10-CM

## 2024-01-10 DIAGNOSIS — Z09 HOSPITAL DISCHARGE FOLLOW-UP: Primary | ICD-10-CM

## 2024-01-10 DIAGNOSIS — E11.22 TYPE 2 DIABETES MELLITUS WITH STAGE 5 CHRONIC KIDNEY DISEASE: ICD-10-CM

## 2024-01-10 DIAGNOSIS — E78.5 HYPERLIPIDEMIA, ACQUIRED: Chronic | ICD-10-CM

## 2024-01-10 DIAGNOSIS — N18.6 ANEMIA DUE TO CHRONIC KIDNEY DISEASE, ON CHRONIC DIALYSIS: ICD-10-CM

## 2024-01-10 DIAGNOSIS — R06.6 INTRACTABLE HICCUPS: ICD-10-CM

## 2024-01-10 DIAGNOSIS — R29.898 WEAKNESS OF BOTH LOWER EXTREMITIES: ICD-10-CM

## 2024-01-10 DIAGNOSIS — C79.9 METASTATIC MALIGNANT NEOPLASM, UNSPECIFIED SITE: ICD-10-CM

## 2024-01-10 DIAGNOSIS — I10 ESSENTIAL HYPERTENSION: ICD-10-CM

## 2024-01-10 DIAGNOSIS — R29.898 WEAKNESS OF RIGHT LOWER EXTREMITY: ICD-10-CM

## 2024-01-10 DIAGNOSIS — R53.81 DEBILITY: ICD-10-CM

## 2024-01-10 DIAGNOSIS — Z99.2 ANEMIA DUE TO CHRONIC KIDNEY DISEASE, ON CHRONIC DIALYSIS: ICD-10-CM

## 2024-01-10 DIAGNOSIS — D50.0 IRON DEFICIENCY ANEMIA DUE TO CHRONIC BLOOD LOSS: ICD-10-CM

## 2024-01-10 DIAGNOSIS — E11.621 DIABETIC ULCER OF LEFT MIDFOOT ASSOCIATED WITH TYPE 2 DIABETES MELLITUS, WITH FAT LAYER EXPOSED: ICD-10-CM

## 2024-01-10 PROCEDURE — 3008F BODY MASS INDEX DOCD: CPT | Mod: CPTII,S$GLB,, | Performed by: INTERNAL MEDICINE

## 2024-01-10 PROCEDURE — 3077F SYST BP >= 140 MM HG: CPT | Mod: CPTII,S$GLB,, | Performed by: INTERNAL MEDICINE

## 2024-01-10 PROCEDURE — 3008F BODY MASS INDEX DOCD: CPT | Mod: CPTII,S$GLB,,

## 2024-01-10 PROCEDURE — 99214 OFFICE O/P EST MOD 30 MIN: CPT | Mod: S$GLB,,, | Performed by: INTERNAL MEDICINE

## 2024-01-10 PROCEDURE — 99999 PR PBB SHADOW E&M-EST. PATIENT-LVL V: CPT | Mod: PBBFAC,,,

## 2024-01-10 PROCEDURE — 3077F SYST BP >= 140 MM HG: CPT | Mod: CPTII,S$GLB,,

## 2024-01-10 PROCEDURE — 1160F RVW MEDS BY RX/DR IN RCRD: CPT | Mod: CPTII,S$GLB,,

## 2024-01-10 PROCEDURE — 1159F MED LIST DOCD IN RCRD: CPT | Mod: CPTII,S$GLB,,

## 2024-01-10 PROCEDURE — 99214 OFFICE O/P EST MOD 30 MIN: CPT | Mod: S$GLB,,,

## 2024-01-10 PROCEDURE — 99999 PR PBB SHADOW E&M-EST. PATIENT-LVL IV: CPT | Mod: PBBFAC,,, | Performed by: INTERNAL MEDICINE

## 2024-01-10 PROCEDURE — 1111F DSCHRG MED/CURRENT MED MERGE: CPT | Mod: CPTII,S$GLB,, | Performed by: INTERNAL MEDICINE

## 2024-01-10 PROCEDURE — 3079F DIAST BP 80-89 MM HG: CPT | Mod: CPTII,S$GLB,, | Performed by: INTERNAL MEDICINE

## 2024-01-10 PROCEDURE — 1111F DSCHRG MED/CURRENT MED MERGE: CPT | Mod: CPTII,S$GLB,,

## 2024-01-10 PROCEDURE — 3078F DIAST BP <80 MM HG: CPT | Mod: CPTII,S$GLB,,

## 2024-01-10 NOTE — PROGRESS NOTES
Subjective:       Patient ID: Catalino Mcfadden is a 58 y.o. male.    Chief Complaint: Follow-up (Hospital f/u / spine lesions )    HPI    Patient admitted to the hospital with inability to lift his legs.  History of diabetes gastroparesis end-stage renal disease on dialysis admitted with hypertensive crisis MRI showed possible metastatic lesion    Impression:     1. Multiple scattered osseous lesions throughout the visualized lumbosacral spine and iliac bones, nonspecific, but given findings on comparison chest CT from 03/22/2023 are concerning for metastatic disease.  Further evaluation recommended.  2. Mild multilevel degenerative change of the lumbar spine, as detailed above, most pronounced at L3-4 through L5-S1 and resulting in mild neural foraminal stenosis.  No significant spinal canal stenosis at any lumbar level.  This report was flagged in Epic as abnormal.     Review of Systems     Has gained weight since hospital stay  Feels well overall. [facility] better than at hospital   much better from the  generalized weakness .  Denies fatigue over above what is normally experienced with day-to-day activities  Denies fever, chills, rigors  Denies issues with ambulation  Denies generalized swelling or new lumps and bumps felt in any part  of body  Denies visual or hearing loss  Denies issues with congestion, sinus issues, cough, sputum production runny nose or itching eyes  Denies chest pain or palpitations, or passing out  Denies abdominal pain, reflux symptoms, nausea vomiting loose stools or constipation  Denies seizure activity or focal weaknesses or symptoms related to TIA, no head aches or blurred vision reported  Denies issues with skin rash or bruising  Denies issues with swelling of feet, tingling or numbness   No issues with sleep,   No recent foreign travel   Good family support reported           Past Medical History:   Diagnosis Date    Diabetes mellitus, type 2     Diabetic foot ulcer associated with diabetes  mellitus due to underlying condition 07/16/2020    ESRD on hemodialysis     Hyperlipidemia     Hypertension     Obese      Past Surgical History:   Procedure Laterality Date    AV FISTULA PLACEMENT Left 07/06/2023    Procedure: CREATION, AV FISTULA;  Surgeon: Daniel Poon MD;  Location: [facility];  Service: Vascular;  Laterality: Left;  RN PREOP 6/28/2023  LABS DAY OF SURGERY    BONE BIOPSY Left 07/17/2020    Procedure: BIOPSY, BONE;  Surgeon: Verona Whitmore DPM;  Location: [facility] OR;  Service: Podiatry;  Laterality: Left;    CERVICAL DISC SURGERY  07/11/2016    DEBRIDEMENT Left 07/17/2020    Procedure: DEBRIDEMENT;  Surgeon: Verona Whitmore DPM;  Location: [facility] OR;  Service: Podiatry;  Laterality: Left;    DEBRIDEMENT OF FOOT Right     toes & plantar surface    ESOPHAGEAL MANOMETRY WITH MEASUREMENT OF IMPEDANCE N/A 03/27/2023    Procedure: MANOMETRY, ESOPHAGUS, WITH IMPEDANCE MEASUREMENT;  Surgeon: Roopa Pérez MD;  [facility]  Service: Endoscopy;  Laterality: N/A;  Belching and rumination protocol please  instructions via portal - sm    ESOPHAGOGASTRODUODENOSCOPY N/A 12/16/2022    Procedure: EGD (ESOPHAGOGASTRODUODENOSCOPY);  Surgeon: Eren Francois MD;  Location: [facility];  Service: Endoscopy;  Laterality: N/A;  Pt. on Dialysis. K+ ordered prior to procedure.EC    ESOPHAGOGASTRODUODENOSCOPY N/A 12/5/2023    Procedure: EGD (ESOPHAGOGASTRODUODENOSCOPY);  Surgeon: Kash Johnston MD;  Location: [facility] ENDO;  Service: Endoscopy;  Laterality: N/A;    FRACTURE SURGERY Right     medial ankle, metal plate present    INSERTION OF TUNNELED CENTRAL VENOUS CATHETER (CVC) WITH SUBCUTANEOUS PORT N/A 06/11/2021    Procedure: TUNNEL CATH INSERTION WITHOUT PORT;  Surgeon: Dosc Diagnostic Provider;  Location: [facility] OR;  Service: Radiology;  Laterality: N/A;  11AM START---PHONE PREOP 6/10/21---COVID POSTIVE ON 7/2020---NO S/S    left leg orthopedic surgery Left     UPPER GASTROINTESTINAL  ENDOSCOPY       Family History   Adopted: Yes   Problem Relation Age of Onset    Heart disease Father     Colon cancer Neg Hx     Esophageal cancer Neg Hx     Colon polyps Neg Hx     Stomach cancer Neg Hx       Social History     Socioeconomic History    Marital status: Other    Number of children: 1   Tobacco Use    Smoking status: Never    Smokeless tobacco: Never   Substance and Sexual Activity    Alcohol use: Not Currently     Comment: occ rare beer    Drug use: No    Sexual activity: Not Currently   Social History Narrative    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     Social Determinants of Health     Financial Resource Strain: Low Risk  (9/26/2022)    Overall Financial Resource Strain (CARDIA)     Difficulty of Paying Living Expenses: Not very hard   Food Insecurity: No Food Insecurity (9/26/2022)    Hunger Vital Sign     Worried About Running Out of Food in the Last Year: Never true     Ran Out of Food in the Last Year: Never true   Transportation Needs: No Transportation Needs (9/26/2022)    PRAPARE - Transportation     Lack of Transportation (Medical): No     Lack of Transportation (Non-Medical): No   Physical Activity: Unknown (9/26/2022)    Exercise Vital Sign     Days of Exercise per Week: Patient declined     Minutes of Exercise per Session: Patient declined   Stress: Stress Concern Present (9/26/2022)    [employer] [place] - Occupational Stress Questionnaire     Feeling of Stress : To some extent   Social Connections: Moderately Integrated (9/26/2022)    Social Connection and Isolation Panel [NHANES]     Frequency of Communication with Friends and Family: Three times a week     Frequency of Social Gatherings with Friends and Family: Three times a week     Attends [employer] Services: 1 to 4 times per year     Active Member of Clubs or Organizations: No     Attends Club or Organization Meetings: Never     Marital Status:    Housing Stability: Low Risk  (9/26/2022)    Housing Stability  Vital Sign     Unable to Pay for Housing in the Last Year: No     Number of Places Lived in the Last Year: 1     Unstable Housing in the Last Year: No     Review of patient's allergies indicates:  No Known Allergies    Current Outpatient Medications:     aspirin 81 MG Chew, Take 1 tablet (81 mg total) by mouth once daily., Disp: 30 tablet, Rfl: 0    methoxy peg-epoetin beta (MIRCERA INJ), Inject 50 mcg into the skin every 28 days., Disp: , Rfl:     NIFEdipine (PROCARDIA XL) 90 MG (OSM) 24 hr tablet, Take 1 tablet (90 mg total) by mouth once daily., Disp: 90 tablet, Rfl: 3    ondansetron (ZOFRAN-ODT) 4 MG TbDL, Take 2 tablets (8 mg total) by mouth every 8 (eight) hours as needed (nausea vomiting)., Disp: 30 tablet, Rfl: 0    promethazine (PHENERGAN) 12.5 MG Tab, Take 1 tablet (12.5 mg total) by mouth every 6 (six) hours as needed (nausea and vomiting)., Disp: 30 tablet, Rfl: 3    sevelamer carbonate (RENVELA) 800 mg Tab, Take 1 tablet (800 mg total) by mouth 3 (three) times daily with meals., Disp: 270 tablet, Rfl: 3    amitriptyline (ELAVIL) 10 MG tablet, Take 1 tablet (10 mg total) by mouth every evening. (Patient not taking: Reported on 12/12/2023), Disp: 30 tablet, Rfl: 0    hydrALAZINE (APRESOLINE) 100 MG tablet, Take 1 tablet (100 mg total) by mouth 3 (three) times daily., Disp: 90 tablet, Rfl: 0    torsemide (DEMADEX) 20 MG Tab, Take 1 tablet by mouth once daily., Disp: , Rfl:     Physical Exam    Wt Readings from Last 3 Encounters:   01/10/24 86.6 kg (190 lb 14.7 oz)   01/08/24 93 kg (205 lb 0.4 oz)   12/18/23 92.5 kg (203 lb 14.8 oz)     Temp Readings from Last 3 Encounters:   01/10/24 98.7 °F (37.1 °C)   01/08/24 98 °F (36.7 °C)   12/18/23 97.4 °F (36.3 °C)     BP Readings from Last 3 Encounters:   01/10/24 (!) 169/87   01/08/24 (!) 180/82   12/18/23 (!) 144/79     Pulse Readings from Last 3 Encounters:   01/10/24 98   01/08/24 83   12/18/23 99    VITAL SIGNS:  as above   GENERAL: appears well-built,  well-nourished.  No anxiety, no agitation, and in no distress.  Patient is awake, alert, oriented and cooperative.  HEENT:  Showed no congestion. Trachea is central no obvious icterus or pallor noted no hoarseness. no obvious JVD   NECK:  Supple.  No JVD. No obvious cervical submental or supraclavicular adenopathy.  RS:the visualized portion of  Chest expands well. chest appears symmetric, no audible wheezes.  No dyspnea recognized  ABDOMEN:  abdomen appears undistended.  EXTREMITIES:  Without edema.  NEUROLOGICAL:  The patient is appropriate, higher functions are normal.  No  obvious neurological deficits.  normal judgement normal thought content  No confusion, no speech impediment. Cranial nerves are intact and show no deficit. No gross motor deficits noted   SKIN MUSCULOSKELETAL: no joint or skeletal deformity, no clubbing of nails.  No visible rash ecchymosis or petechiae     Lab Results   Component Value Date    WBC 4.79 12/18/2023    HGB 10.3 (L) 12/18/2023    HCT 31.1 (L) 12/18/2023    MCV 88 12/18/2023    PLT 99 (L) 12/18/2023       BMP  Lab Results   Component Value Date     (L) 12/18/2023    K 4.4 12/18/2023    CL 96 12/18/2023    CO2 19 (L) 12/18/2023    BUN 69 (H) 12/18/2023    CREATININE 9.9 (H) 12/18/2023    CALCIUM 9.4 12/18/2023    ANIONGAP 13 12/18/2023    ESTGFRAFRICA 10.4 (A) 02/23/2022    EGFRNONAA 9.0 (A) 02/23/2022     Lab Results   Component Value Date    IRON 82 02/09/2022    TIBC 306 02/09/2022    FERRITIN 582 (H) 02/09/2022     Patient has polyclonal hypogammaglobulinemia  mponent Ref Range & Units 4 wk ago   Free Kappa Lt Chains,S 0.3300 - 1.94 mg/dL 26.3 High    Free Lambda Lt Chains,S 0.5700 - 2.63 mg/dL 18.5 High    Kappa/Lambda FLC Ratio 0.2600 - 1.65 1.42     Patient Active Problem List   Diagnosis    Type 2 diabetes mellitus with stage 5 chronic kidney disease    Essential hypertension    Hyperlipidemia, acquired    ED (erectile dysfunction)    History of diabetic ulcer of foot     Osteomyelitis of second toe of left foot    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    Long term (current) use of antibiotics    Acute osteomyelitis of metatarsal bone of left foot    Diabetic ulcer of toe of left foot    Hyponatremia    Osteomyelitis of left foot    Hypoalbuminemia    Healed ulcer of left foot on examination    Iron deficiency anemia    Metabolic acidosis    Anemia due to chronic kidney disease, on chronic dialysis    Diabetic ulcer of left midfoot associated with type 2 diabetes mellitus, with fat layer exposed    Type II diabetes mellitus with neurological manifestations    Foot ulcer, right, with fat layer exposed    Foot ulceration, left, with fat layer exposed    ESRD on hemodialysis    Malignant renovascular hypertension    Calcified granuloma of lung    Abnormal findings on diagnostic imaging of lung    Tracheobronchomalacia    Intractable hiccups    Serum total bilirubin elevated    Hypertensive emergency    Lower extremity weakness    Abnormal MRI, lumbar spine    Goals of care, counseling/discussion    DJD (degenerative joint disease)    Complete lesion of L1 level of lumbar spinal cord    Debility        Assessment and Plan     Lesions per MRI not correlating to skeletal images   Will get pet and may need bone bx    Doubt this is of sig  Pt moving back to live in [facility] will finish w/u here   See me with pet   ESRD : on dialysis  HTN : uncontrolled, seeing pcp was hospitalized  DM related foot ulcer, was on treatment some better now  Advance Care Planning     Date: 01/11/2024    Power of   I initiated the process of voluntary advance care planning today and explained the importance of this process to the patient.  I introduced the concept of advance directives to the patient, as well. Then the patient received detailed information about the importance of designating a Health Care Power of   (HCPOA). He was also instructed to communicate with this person about their wishes for future healthcare, should he become sick and lose decision-making capacity.

## 2024-01-10 NOTE — PROGRESS NOTES
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  No.  Diagnostic tests reviewed/disposition: I have reviewed all completed as well as pending diagnostic tests at the time of discharge.  Disease/illness education: As below  Medication changes: None today  Home health/community services discussion/referrals: Patient does not have home health established from hospital visit.  They do not need home health.  If needed, we will set up home health for the patient.   Establishment or re-establishment of referral orders for community resources: No other necessary community resources.   Discussion with other health care providers: No discussion with other health care providers necessary.                       Subjective:       Patient ID: Catalino Mcfadden is a 58 y.o. male.    Chief Complaint: No chief complaint on file.    Hospital discharge follow up. Presentation and course can be seen below. Since discharge from rehab has been doing well. Ambulating better and walking without a cane. He wishes to establish with outpatient PT in [place]. He is moving to [facility] because his current living situation is not appropriate. He plans to establish with a PCP in [facility] when he moves. Saw Oncology today and plans are for PET. Does not have home health established and denies the need for HH. Current dialysis T, Th, Sat. Labs per nephrology team.     Blood pressure is elevated but he admits eating a salty snack prior to arrival.         HPI:   Catalino Mcfadden is a 59 y/o M with past medical history significant for HTN, ESRD on HD, DM2, gastroparesis, diabetic foot ulcer, chronic hiccups, and HLD who presented to  "the ED for with episodic dizziness, vomiting, and lower extremity weakness following a fall x 1 day. He reports nausea has been resolved with Zofran. He denies chest pain, shortness of breath, fever, chills, abdominal pain, headaches, light-headedness, numbness, tingling, or LOC. He reports he missed his Saturday hemodialysis treatment but states last week he received HD Tues, Wed, Thurs, during hospitaliztion. He was recently discharged 12/07. Lab work reflective of ESRD with elevated bili, ALP, and AST. In the ED blood pressure 200s/100s, received IV and PO antihypertensive medication with mild improvemnt. He states he did not take his medication yesterday due to N/V. During examination patient states he is unable to lift his lower extremities, however, was able to walk into the ED with assistance from a friend. CT Head negative. Admitted to hospital medicine for hypertensive emergency evaluation and treatment with nephrology and neurology consults placed.            * No surgery found *       Hospital Course:   Patient presented with hypertensive emergency not taking medication for blood pressure.  He reports nausea which has resolved in early morning he was able to tolerate breakfast.  Patient had also reported a fall at home a few days prior to presentation.  MRI lumbar spine obtained that showed was concerning for metastatic disease.  Nephrology followed during hospitalization with dialysis routine as scheduled TTHS.  Patient was evaluated by Neurology and neurosurgery.  Recommendations were made for EMG/nerve conduction study after discharge. Oncology was consulted.  Oncology workup was started.  Patient is to follow-up outpatient for biopsy of spinal lesions, however noted that, "Skeletal survey did not show any lytic lesions. He will follow up in clinic as he will likely need a biopsy of spinal lesions as outpatient - awaiting SPEP" - Palliative care consult code status remains full code. He did have " hyperkalemia and was treated with dialysis.  PT and OT were consulted.  Case management was consulted for discharge planning and rehab placement.  He was cleared from all perspectives for discharge to rehab.       Past Medical History:   Diagnosis Date    Diabetes mellitus, type 2     Diabetic foot ulcer associated with diabetes mellitus due to underlying condition 07/16/2020    ESRD on hemodialysis     Hyperlipidemia     Hypertension     Obese        Review of patient's allergies indicates:  No Known Allergies      Current Outpatient Medications:     amitriptyline (ELAVIL) 10 MG tablet, Take 1 tablet (10 mg total) by mouth every evening. (Patient not taking: Reported on 12/12/2023), Disp: 30 tablet, Rfl: 0    aspirin 81 MG Chew, Take 1 tablet (81 mg total) by mouth once daily., Disp: 30 tablet, Rfl: 0    hydrALAZINE (APRESOLINE) 100 MG tablet, Take 1 tablet (100 mg total) by mouth 3 (three) times daily., Disp: 90 tablet, Rfl: 0    methoxy peg-epoetin beta (MIRCERA INJ), Inject 50 mcg into the skin every 28 days., Disp: , Rfl:     NIFEdipine (PROCARDIA XL) 90 MG (OSM) 24 hr tablet, Take 1 tablet (90 mg total) by mouth once daily., Disp: 90 tablet, Rfl: 3    ondansetron (ZOFRAN-ODT) 4 MG TbDL, Take 2 tablets (8 mg total) by mouth every 8 (eight) hours as needed (nausea vomiting)., Disp: 30 tablet, Rfl: 0    promethazine (PHENERGAN) 12.5 MG Tab, Take 1 tablet (12.5 mg total) by mouth every 6 (six) hours as needed (nausea and vomiting)., Disp: 30 tablet, Rfl: 3    sevelamer carbonate (RENVELA) 800 mg Tab, Take 1 tablet (800 mg total) by mouth 3 (three) times daily with meals., Disp: 270 tablet, Rfl: 3    torsemide (DEMADEX) 20 MG Tab, Take 1 tablet by mouth once daily., Disp: , Rfl:     Review of Systems   Constitutional:  Negative for activity change, appetite change, chills, diaphoresis, fatigue, fever and unexpected weight change.   HENT:  Negative for congestion, ear pain, postnasal drip, rhinorrhea, sinus  pressure, sneezing, sore throat and trouble swallowing.    Eyes:  Negative for pain, itching and visual disturbance.   Respiratory:  Negative for cough, chest tightness, shortness of breath and wheezing.    Cardiovascular:  Negative for chest pain, palpitations and leg swelling.   Gastrointestinal:  Negative for abdominal distention, abdominal pain, blood in stool, constipation, diarrhea, nausea and vomiting.   Endocrine: Negative for cold intolerance and heat intolerance.   Genitourinary:  Negative for difficulty urinating, dysuria, frequency, hematuria and urgency.   Musculoskeletal:  Negative for arthralgias, back pain, myalgias and neck pain.   Skin:  Negative for color change, pallor, rash and wound.   Neurological:  Negative for dizziness, syncope, weakness, numbness and headaches.   Hematological:  Negative for adenopathy.   Psychiatric/Behavioral:  Negative for behavioral problems. The patient is not nervous/anxious.        Objective:      There were no vitals taken for this visit.  Physical Exam  Vitals reviewed.   Constitutional:       General: He is not in acute distress.     Appearance: Normal appearance. He is normal weight. He is not ill-appearing, toxic-appearing or diaphoretic.   HENT:      Head: Normocephalic.      Right Ear: External ear normal.      Left Ear: External ear normal.      Nose: Nose normal. No congestion or rhinorrhea.      Mouth/Throat:      Mouth: Mucous membranes are moist.      Pharynx: Oropharynx is clear.   Eyes:      General: No scleral icterus.        Right eye: No discharge.         Left eye: No discharge.      Extraocular Movements: Extraocular movements intact.      Conjunctiva/sclera: Conjunctivae normal.   Cardiovascular:      Rate and Rhythm: Normal rate and regular rhythm.      Pulses: Normal pulses.      Heart sounds: Normal heart sounds. No murmur heard.     No friction rub. No gallop.   Pulmonary:      Effort: Pulmonary effort is normal. No respiratory distress.       Breath sounds: Normal breath sounds. No wheezing, rhonchi or rales.   Chest:      Chest wall: No tenderness.   Abdominal:      Comments: Abdominal hematoma present      Musculoskeletal:         General: No swelling, tenderness or deformity. Normal range of motion.      Cervical back: Normal range of motion.      Right lower leg: No edema.      Left lower leg: No edema.   Skin:     General: Skin is warm and dry.      Capillary Refill: Capillary refill takes less than 2 seconds.      Coloration: Skin is not jaundiced.      Findings: No bruising, erythema, lesion or rash.   Neurological:      Mental Status: He is alert and oriented to person, place, and time.      Sensory: No sensory deficit.      Gait: Gait normal.   Psychiatric:         Mood and Affect: Mood normal.         Behavior: Behavior normal.         Thought Content: Thought content normal.         Judgment: Judgment normal.         Assessment:       1. Hospital discharge follow-up    2. Debility    3. Weakness of both lower extremities    4. Intractable hiccups    5. Essential hypertension    6. Type 2 diabetes mellitus with stage 5 chronic kidney disease        Plan:       Hospital discharge follow-up        -   Has appropriate follow up scheduled. Outpatient PT orders placed.     Debility  -     Ambulatory referral/consult to Physical/Occupational Therapy; Future; Expected date: 01/17/2024    Weakness of both lower extremities  -     Ambulatory referral/consult to Physical/Occupational Therapy; Future; Expected date: 01/17/2024    Intractable hiccups       -    Has procedure scheduled     Essential hypertension       -     Elevated at presentation likely due to salt intake        -    Continue meds. Will continue to monitor.     Type 2 diabetes mellitus with stage 5 chronic kidney disease       -   Follows with nephrology routinely.                  Marty Rodríguez PA-C  Family Medicine Physician Assistant       Future Appointments       Date Provider  Specialty Appt Notes    1/10/2024 Marty Rod[facility]uez PA-C [facility]    1/18/2024 Brandi Kaye MD Neurology [facility]  Spoke with patient to confirm appt., pt. would like to r/s appt. to a different date, 12/22/23 at 7:30 am, noted by RUDY Payne MA on 11/28/23 at 9:32 am  spoke with pt. informed pt.that the physician will be out on 12/22, offered new appt. date 01/18/23 at 8:30 am, pt .accepted, noted by RUDY Payne MA on 11/28/23 at 9:54 am    1/19/2024  Radiology [facility]    1/24/2024 Nunu Hill MD Hematology and Oncology Spine Lesions/PET results    2/14/2024 Aviva Martinez, DPM Podiatry 3 months               I spent a total of 30 minutes on the day of the visit.This includes face to face time and non-face to face time preparing to see the patient (eg, review of tests), obtaining and/or reviewing separately obtained history, documenting clinical information in the electronic or other health record, independently interpreting results and communicating results to the patient/family/caregiver, or care coordinator.      We have addressed [4] Moderate: 1 or more chronic illnesses with exacerbation, progression, or side effects of treatment / 2 or more stable chronic illnesses / 1 undiagnosed new problem with uncertain prognosis / 1 acute illness with systemic symptoms / 1 acute complicated injury  The complexity of the data reviewed and analyzed for this visit was [2] Minimal or None  The risk of complications and/or morbidity or mortality are [4] Moderate risk (I.e. prescription drug management / decision regarding minor surgery with identified pt or procedure risk factors / decision regarding elective major surgery without identified pt or procedure risk factors / diagnosis or treatment significantly limited by social determinants of health)   The level of Medical Decision Making for this visit is [4] Moderate

## 2024-01-10 NOTE — PATIENT INSTRUCTIONS
"Tripp Donnellyron,     If you are due for any health screening(s) below please notify me so we can arrange them to be ordered and scheduled to maintain your health. Most healthy patients complete it. Don't lose out on improving your health.     Tests to Keep You Healthy    Eye Exam: ORDERED BUT NOT SCHEDULED  Colon Cancer Screening: Met on 11/10/2020                 Diabetic Retinal Eye Exam    Diabetes is the #1 cause of blindness in the [place] - early detection before signs or symptoms develop can prevent debilitating blindness.    Once-a-year screening is recommended for all diabetic patients. This exam can prevent and treat diabetes complications in the eye before developing symptoms. This can be done with a special camera is used to take photographs of the back of your eye without having to dilate them, or you can see an eye doctor for a full dilated exam.          Patient Education       Checking Your Blood Pressure at Home   The Basics   Written by the doctors and editors at [facility]   How is blood pressure measured? -- Blood pressure is usually measured with a device that goes around your upper arm. This is often done in a doctor's office. But some people also check their blood pressure themselves, at home or at work.  Blood pressure is explained with 2 numbers. For instance, your blood pressure might be "140 over 90." The first (top) number is the pressure inside your arteries when your heart is andrew. The second (bottom) number is the pressure inside your arteries when your heart is relaxed. The table shows how doctors and nurses define high and normal blood pressure (table 1).  If your blood pressure gets too high, it puts you at risk for heart attack, stroke, and kidney disease. High blood pressure does not usually cause symptoms. But it can be serious.  What is a home blood pressure meter? -- A home blood pressure meter (or "monitor") is a device you can use to check your blood pressure yourself. It has a cuff " that goes around your upper arm (figure 1). Some devices have a cuff that goes around your wrist instead. But doctors aren't sure if these work as well. The meter also has a small screen, or dial, that shows your blood pressure numbers.  There are also special meters you can wear for a day or 2. These are different because they automatically check your blood pressure throughout the day and night, even while you are sleeping. If your doctor thinks you should use one of these devices, they will talk to you about how to wear it.  Why do I need to check my blood pressure at home? -- If your doctor knows or suspects that you have high blood pressure, they might want you to check it at home. There are a few reasons for this. Your doctor might want to look at:  Whether your blood pressure measures the same at home as it did in the doctor's office  How well your blood pressure medicines are working  Changes in your blood pressure, for example, if it goes up and down  People who check their own blood pressure at home usually do better at keeping it low.  How do I choose a home blood pressure meter? -- When choosing a home blood pressure meter, you will probably want to think about:  Cost - Some devices cost more than others. You should also check to see if your insurance will help pay for your device.  Size - It's important to make sure the cuff fits your arm comfortably. Your doctor or nurse can help you with this.  How easy it is to use - You should make sure you understand how to use the device. You also need to be able to read the numbers on the screen.  You do not need a prescription to buy a home blood pressure meter. You can buy them at most pharmacies or over the internet. Your doctor or nurse can help you choose the right device for you.  How do I check my blood pressure at home? -- Once you have a home blood pressure meter, your doctor or nurse should check it to make sure it fits you and works correctly.  When  it's time to check your blood pressure:  Go to the bathroom and empty your bladder first. Having a full bladder can temporarily increase your blood pressure, making the results inaccurate.  Sit in a chair with your feet flat on the ground.  Try to breathe normally and stay calm.  Attach the cuff to your arm. Place the cuff directly on your skin, not over your clothing. The cuff should be tight enough to not slip down, but not uncomfortably tight.  Sit and relax for about 3 to 5 minutes with the cuff on.  Follow the directions that came with your device to start measuring your blood pressure. This might involve squeezing the bulb at the end of the tube to inflate the cuff (fill it with air). With some monitors, you just need to press a button to inflate the cuff. When the cuff fills with air, it feels like someone is squeezing your arm, but it should not hurt. Then you will slowly deflate the cuff (let the air out of it), or it will deflate by itself. The screen or dial will show your blood pressure numbers.  Stay seated and relax for 1 minute, then measure your blood pressure again.  How often should I check my blood pressure? -- It depends. Different people need to follow different schedules. Your doctor or nurse will tell you how often to check your blood pressure, and when. Some people need to check their blood pressure twice a day, in the morning and evening.  Your doctor or nurse will probably tell you to keep track of your blood pressure for at least a few days (table 2). Then they will look at the numbers. The reason for this is that it's normal for your blood pressure to change a bit from day to day. For example, the numbers might change depending on whether you recently had caffeine, just exercised, or feel stressed. Checking your blood pressure over several days - or longer - will give your doctor or nurse a better idea of what is average for you.  How should I keep track of my blood pressure? -- Some  "blood pressure meters will record your numbers for you, or send them to your computer or smartphone. If yours does not do this, you will need to write them down. Your doctor or nurse can help you figure out the best way to keep track of the numbers.  What if my blood pressure is high? -- Your doctor or nurse will tell you what to do if your blood pressure is high when you check it at home. If you get a number that is higher than normal, measure it again to see if it is still high. If it is very high (above a certain number, which your doctor or nurse will tell you to watch out for), you should call your doctor right away.  If your blood pressure is only a little high, your doctor or nurse might tell you to keep checking it for a few more days or weeks, and then call if it does not go back down. Then they can help you decide what to do next.  All topics are updated as new evidence becomes available and our peer review process is complete.  This topic retrieved from [employer] on: Sep 21, 2021.  Topic 060849 Version 4.0  Release: 29.4.2 - C29.263  © 2021 UpToDate, Inc. and/or its affiliates. All rights reserved.  table 1: Definition of normal and high blood pressure  Level  Top number  Bottom number    High 130 or above 80 or above   Elevated 120 to 129 79 or below   Normal 119 or below 79 or below   These definitions are from the American College of Cardiology/American Heart Association. Other expert groups might use slightly different definitions.  "Elevated blood pressure" is a term doctor or nurses use as a warning. It means you do not yet have high blood pressure, but your blood pressure is not as low as it should be for good health.  Graphic 09195 Version 6.0  figure 1: Using a home blood pressure meter     This is an example of a person using a home blood pressure meter.  Graphic 882697 Version 1.0    table 2: 7-day diary for checking blood pressure at home  Day 1  Day 2  Day 3  Day 4  Day 5  Day 6  Day 7  "   Morning  1st read Morning  1st read Morning  1st read Morning  1st read Morning  1st read Morning  1st read Morning  1st read   Systolic: __________ Systolic: __________ Systolic: __________ Systolic: __________ Systolic: __________ Systolic: __________ Systolic: __________   Diastolic: __________ Diastolic: __________ Diastolic: __________ Diastolic: __________ Diastolic: __________ Diastolic: __________ Diastolic: __________   Pulse: __________ Pulse: __________ Pulse: __________ Pulse: __________ Pulse: __________ Pulse: __________ Pulse: __________   Morning  2nd read Morning  2nd read Morning  2nd read Morning  2nd read Morning  2nd read Morning  2nd read Morning  2nd read   Systolic: __________ Systolic: __________ Systolic: __________ Systolic: __________ Systolic: __________ Systolic: __________ Systolic: __________   Diastolic: __________ Diastolic: __________ Diastolic: __________ Diastolic: __________ Diastolic: __________ Diastolic: __________ Diastolic: __________   Pulse: __________ Pulse: __________ Pulse: __________ Pulse: __________ Pulse: __________ Pulse: __________ Pulse: __________   Evening  1st read Evening  1st read Evening  1st read Evening  1st read Evening  1st read Evening  1st read Evening  1st read   Systolic: __________ Systolic: __________ Systolic: __________ Systolic: __________ Systolic: __________ Systolic: __________ Systolic: __________   Diastolic: __________ Diastolic: __________ Diastolic: __________ Diastolic: __________ Diastolic: __________ Diastolic: __________ Diastolic: __________   Pulse: __________ Pulse: __________ Pulse: __________ Pulse: __________ Pulse: __________ Pulse: __________ Pulse: __________   Evening  2nd read Evening  2nd read Evening  2nd read Evening  2nd read Evening  2nd read Evening  2nd read Evening  2nd read   Systolic: __________ Systolic: __________ Systolic: __________ Systolic: __________ Systolic: __________ Systolic: __________ Systolic:  __________   Diastolic: __________ Diastolic: __________ Diastolic: __________ Diastolic: __________ Diastolic: __________ Diastolic: __________ Diastolic: __________   Pulse: __________ Pulse: __________ Pulse: __________ Pulse: __________ Pulse: __________ Pulse: __________ Pulse: __________   Notes    Notes    Notes    Notes    Notes    Notes    Notes      ____________________ ____________________ ____________________ ____________________ ____________________ ____________________ ____________________   ____________________ ____________________ ____________________ ____________________ ____________________ ____________________ ____________________   ____________________ ____________________ ____________________ ____________________ ____________________ ____________________ ____________________   Patient name: ______________________________     Patient ID: ________________________________    Primary care provider: _______________________    Average BP: _______________________________    Graphic 701076 Version 1.0  Consumer Information Use and Disclaimer   This information is not specific medical advice and does not replace information you receive from your health care provider. This is only a brief summary of general information. It does NOT include all information about conditions, illnesses, injuries, tests, procedures, treatments, therapies, discharge instructions or life-style choices that may apply to you. You must talk with your health care provider for complete information about your health and treatment options. This information should not be used to decide whether or not to accept your health care provider's advice, instructions or recommendations. Only your health care provider has the knowledge and training to provide advice that is right for you. The use of this information is governed by the [employer] End User License Agreement, available at  https://www.[employer]tersPump!uwer.com/en/solutions/lexicomp/about/bradley.The use of [employer] content is governed by the [employer] Terms of Use. ©2021 [employer] All rights reserved.  Copyright   © 2021 UpToDate, Inc. and/or its affiliates. All rights reserved.

## 2024-01-12 ENCOUNTER — TELEPHONE (OUTPATIENT)
Dept: SURGERY | Facility: CLINIC | Age: 59
End: 2024-01-12
Payer: COMMERCIAL

## 2024-01-12 NOTE — TELEPHONE ENCOUNTER
Reached out to pt to schedule per referral. Pt states he was confused as to why he was referred to  as he is already seeing some one else and doesn't have any stomach issues. Pt declined appt at this time. Let them know they could reach out to schedule if anything changed

## 2024-01-19 ENCOUNTER — HOSPITAL ENCOUNTER (OUTPATIENT)
Dept: RADIOLOGY | Facility: HOSPITAL | Age: 59
Discharge: HOME | End: 2024-01-19
Attending: INTERNAL MEDICINE
Payer: COMMERCIAL

## 2024-01-19 DIAGNOSIS — C79.9 METASTATIC MALIGNANT NEOPLASM, UNSPECIFIED SITE: ICD-10-CM

## 2024-01-19 LAB — POCT GLUCOSE: 96 MG/DL (ref 70–110)

## 2024-01-19 PROCEDURE — 78816 PET IMAGE W/CT FULL BODY: CPT | Mod: TC

## 2024-01-19 PROCEDURE — 78816 PET IMAGE W/CT FULL BODY: CPT | Mod: 26,PI,, | Performed by: STUDENT IN AN ORGANIZED HEALTH CARE EDUCATION/TRAINING PROGRAM

## 2024-01-23 ENCOUNTER — PATIENT MESSAGE (OUTPATIENT)
Dept: PAIN MEDICINE | Facility: OTHER | Age: 59
End: 2024-01-23
Payer: COMMERCIAL

## 2024-01-24 ENCOUNTER — OFFICE VISIT (OUTPATIENT)
Dept: HEMATOLOGY/ONCOLOGY | Facility: CLINIC | Age: 59
End: 2024-01-24
Payer: COMMERCIAL

## 2024-01-24 ENCOUNTER — TELEPHONE (OUTPATIENT)
Dept: INTERVENTIONAL RADIOLOGY/VASCULAR | Facility: HOSPITAL | Age: 59
End: 2024-01-24

## 2024-01-24 ENCOUNTER — SOCIAL WORK (OUTPATIENT)
Dept: HEMATOLOGY/ONCOLOGY | Facility: CLINIC | Age: 59
End: 2024-01-24

## 2024-01-24 ENCOUNTER — TELEPHONE (OUTPATIENT)
Dept: HEMATOLOGY/ONCOLOGY | Facility: CLINIC | Age: 59
End: 2024-01-24

## 2024-01-24 VITALS
DIASTOLIC BLOOD PRESSURE: 70 MMHG | OXYGEN SATURATION: 100 % | WEIGHT: 189.81 LBS | SYSTOLIC BLOOD PRESSURE: 190 MMHG | BODY MASS INDEX: 24.36 KG/M2 | TEMPERATURE: 98 F | HEIGHT: 74 IN | RESPIRATION RATE: 16 BRPM | HEART RATE: 81 BPM

## 2024-01-24 DIAGNOSIS — E11.621 DIABETIC ULCER OF LEFT MIDFOOT ASSOCIATED WITH TYPE 2 DIABETES MELLITUS, WITH BONE INVOLVEMENT WITHOUT EVIDENCE OF NECROSIS: ICD-10-CM

## 2024-01-24 DIAGNOSIS — C79.9 METASTATIC MALIGNANT NEOPLASM, UNSPECIFIED SITE: Primary | ICD-10-CM

## 2024-01-24 DIAGNOSIS — C78.01 MALIGNANT NEOPLASM METASTATIC TO BOTH LUNGS: ICD-10-CM

## 2024-01-24 DIAGNOSIS — E78.5 HYPERLIPIDEMIA, ACQUIRED: Chronic | ICD-10-CM

## 2024-01-24 DIAGNOSIS — N18.5 TYPE 2 DIABETES MELLITUS WITH STAGE 5 CHRONIC KIDNEY DISEASE: ICD-10-CM

## 2024-01-24 DIAGNOSIS — C78.7 METASTASIS TO LIVER: ICD-10-CM

## 2024-01-24 DIAGNOSIS — L97.426 DIABETIC ULCER OF LEFT MIDFOOT ASSOCIATED WITH TYPE 2 DIABETES MELLITUS, WITH BONE INVOLVEMENT WITHOUT EVIDENCE OF NECROSIS: ICD-10-CM

## 2024-01-24 DIAGNOSIS — C78.02 MALIGNANT NEOPLASM METASTATIC TO BOTH LUNGS: ICD-10-CM

## 2024-01-24 DIAGNOSIS — D50.0 IRON DEFICIENCY ANEMIA DUE TO CHRONIC BLOOD LOSS: ICD-10-CM

## 2024-01-24 DIAGNOSIS — I10 ESSENTIAL HYPERTENSION: Chronic | ICD-10-CM

## 2024-01-24 DIAGNOSIS — E11.22 TYPE 2 DIABETES MELLITUS WITH STAGE 5 CHRONIC KIDNEY DISEASE: ICD-10-CM

## 2024-01-24 DIAGNOSIS — E11.49 TYPE II DIABETES MELLITUS WITH NEUROLOGICAL MANIFESTATIONS: ICD-10-CM

## 2024-01-24 DIAGNOSIS — R06.6 INTRACTABLE HICCUPS: ICD-10-CM

## 2024-01-24 DIAGNOSIS — C79.51 METASTASIS TO BONE: ICD-10-CM

## 2024-01-24 PROBLEM — C78.00 METASTASIS TO LUNG: Status: ACTIVE | Noted: 2024-01-24

## 2024-01-24 PROCEDURE — 1159F MED LIST DOCD IN RCRD: CPT | Mod: CPTII,S$GLB,, | Performed by: INTERNAL MEDICINE

## 2024-01-24 PROCEDURE — 99215 OFFICE O/P EST HI 40 MIN: CPT | Mod: S$GLB,,, | Performed by: INTERNAL MEDICINE

## 2024-01-24 PROCEDURE — 99999 PR PBB SHADOW E&M-EST. PATIENT-LVL IV: CPT | Mod: PBBFAC,,, | Performed by: INTERNAL MEDICINE

## 2024-01-24 PROCEDURE — 3077F SYST BP >= 140 MM HG: CPT | Mod: CPTII,S$GLB,, | Performed by: INTERNAL MEDICINE

## 2024-01-24 PROCEDURE — 3008F BODY MASS INDEX DOCD: CPT | Mod: CPTII,S$GLB,, | Performed by: INTERNAL MEDICINE

## 2024-01-24 PROCEDURE — 3078F DIAST BP <80 MM HG: CPT | Mod: CPTII,S$GLB,, | Performed by: INTERNAL MEDICINE

## 2024-01-24 NOTE — PROGRESS NOTES
Subjective:       Patient ID: Catalino Mcfadden is a 58 y.o. male.    Chief Complaint: Spine lesions    HPI    Patient admitted to the hospital with inability to lift his legs.  History of diabetes gastroparesis end-stage renal disease on dialysis admitted with hypertensive crisis MRI showed possible metastatic lesion    Impression:     1. Multiple scattered osseous lesions throughout the visualized lumbosacral spine and iliac bones, nonspecific, but given findings on comparison chest CT from 03/22/2023 are concerning for metastatic disease.  Further evaluation recommended.  2. Mild multilevel degenerative change of the lumbar spine, as detailed above, most pronounced at L3-4 through L5-S1 and resulting in mild neural foraminal stenosis.  No significant spinal canal stenosis at any lumbar level.  This report was flagged in Epic as abnormal.     Review of Systems     Has gained weight since hospital stay  Feels well overall. [facility] better than at hospital   much better from the  generalized weakness .intractable hiccups+  Denies fatigue over above what is normally experienced with day-to-day activities  Denies fever, chills, rigors  Denies issues with ambulation  Denies generalized swelling or new lumps and bumps felt in any part  of body  Denies visual or hearing loss  Denies issues with congestion, sinus issues, cough, sputum production runny nose or itching eyes  Denies chest pain or palpitations, or passing out  Denies abdominal pain, reflux symptoms, nausea vomiting loose stools or constipation  Denies seizure activity or focal weaknesses or symptoms related to TIA, no head aches or blurred vision reported  Denies issues with skin rash or bruising  Denies issues with swelling of feet, tingling or numbness   No issues with sleep,   No recent foreign travel   Good family support reported           Past Medical History:   Diagnosis Date    Diabetes mellitus, type 2     Diabetic foot ulcer associated with diabetes mellitus  due to underlying condition 07/16/2020    ESRD on hemodialysis     Hyperlipidemia     Hypertension     Obese      Past Surgical History:   Procedure Laterality Date    AV FISTULA PLACEMENT Left 07/06/2023    Procedure: CREATION, AV FISTULA;  Surgeon: Daniel Poon MD;  Location: [facility];  Service: Vascular;  Laterality: Left;  RN PREOP 6/28/2023  LABS DAY OF SURGERY    BONE BIOPSY Left 07/17/2020    Procedure: BIOPSY, BONE;  Surgeon: Verona Whitmore DPM;  Location: [facility] OR;  Service: Podiatry;  Laterality: Left;    CERVICAL DISC SURGERY  07/11/2016    DEBRIDEMENT Left 07/17/2020    Procedure: DEBRIDEMENT;  Surgeon: Verona Whitmore DPM;  Location: [facility] OR;  Service: Podiatry;  Laterality: Left;    DEBRIDEMENT OF FOOT Right     toes & plantar surface    ESOPHAGEAL MANOMETRY WITH MEASUREMENT OF IMPEDANCE N/A 03/27/2023    Procedure: MANOMETRY, ESOPHAGUS, WITH IMPEDANCE MEASUREMENT;  Surgeon: Roopa Pérez MD;  [facility]  Service: Endoscopy;  Laterality: N/A;  Belching and rumination protocol please  instructions via portal - sm    ESOPHAGOGASTRODUODENOSCOPY N/A 12/16/2022    Procedure: EGD (ESOPHAGOGASTRODUODENOSCOPY);  Surgeon: Eren Francois MD;  Location: [facility];  Service: Endoscopy;  Laterality: N/A;  Pt. on Dialysis. K+ ordered prior to procedure.EC    ESOPHAGOGASTRODUODENOSCOPY N/A 12/5/2023    Procedure: EGD (ESOPHAGOGASTRODUODENOSCOPY);  Surgeon: Kash Johnston MD;  Location: [facility];  Service: Endoscopy;  Laterality: N/A;    FRACTURE SURGERY Right     medial ankle, metal plate present    INSERTION OF TUNNELED CENTRAL VENOUS CATHETER (CVC) WITH SUBCUTANEOUS PORT N/A 06/11/2021    Procedure: TUNNEL CATH INSERTION WITHOUT PORT;  Surgeon: Dosc Diagnostic Provider;  Location: [facility] OR;  Service: Radiology;  Laterality: N/A;  11AM START---PHONE PREOP 6/10/21---COVID POSTIVE ON 7/2020---NO S/S    left leg orthopedic surgery Left     UPPER GASTROINTESTINAL ENDOSCOPY        Family History   Adopted: Yes   Problem Relation Age of Onset    Heart disease Father     Colon cancer Neg Hx     Esophageal cancer Neg Hx     Colon polyps Neg Hx     Stomach cancer Neg Hx       Social History     Socioeconomic History    Marital status: Other    Number of children: 1   Tobacco Use    Smoking status: Never    Smokeless tobacco: Never   Substance and Sexual Activity    Alcohol use: Not Currently     Comment: occ rare beer    Drug use: No    Sexual activity: Not Currently   Social History Narrative    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     Social Determinants of Health     Financial Resource Strain: Low Risk  (9/26/2022)    Overall Financial Resource Strain (CARDIA)     Difficulty of Paying Living Expenses: Not very hard   Food Insecurity: No Food Insecurity (9/26/2022)    Hunger Vital Sign     Worried About Running Out of Food in the Last Year: Never true     Ran Out of Food in the Last Year: Never true   Transportation Needs: No Transportation Needs (9/26/2022)    PRAPARE - Transportation     Lack of Transportation (Medical): No     Lack of Transportation (Non-Medical): No   Physical Activity: Unknown (9/26/2022)    Exercise Vital Sign     Days of Exercise per Week: Patient declined     Minutes of Exercise per Session: Patient declined   Stress: Stress Concern Present (9/26/2022)    [employer] [place] [employer] - Occupational Stress Questionnaire     Feeling of Stress : To some extent   Social Connections: Moderately Integrated (9/26/2022)    Social Connection and Isolation Panel [NHANES]     Frequency of Communication with Friends and Family: Three times a week     Frequency of Social Gatherings with Friends and Family: Three times a week     Attends Rastafarian Services: 1 to 4 times per year     Active Member of Clubs or Organizations: No     Attends Club or Organization Meetings: Never     Marital Status:    Housing Stability: Low Risk  (9/26/2022)    Housing Stability Vital Sign      Unable to Pay for Housing in the Last Year: No     Number of Places Lived in the Last Year: 1     Unstable Housing in the Last Year: No     Review of patient's allergies indicates:  No Known Allergies    Current Outpatient Medications:     amitriptyline (ELAVIL) 10 MG tablet, Take 1 tablet (10 mg total) by mouth every evening., Disp: 30 tablet, Rfl: 0    aspirin 81 MG Chew, Take 1 tablet (81 mg total) by mouth once daily., Disp: 30 tablet, Rfl: 0    hydrALAZINE (APRESOLINE) 100 MG tablet, Take 1 tablet (100 mg total) by mouth 3 (three) times daily., Disp: 90 tablet, Rfl: 0    methoxy peg-epoetin beta (MIRCERA INJ), Inject 50 mcg into the skin every 28 days., Disp: , Rfl:     NIFEdipine (PROCARDIA XL) 90 MG (OSM) 24 hr tablet, Take 1 tablet (90 mg total) by mouth once daily., Disp: 90 tablet, Rfl: 3    ondansetron (ZOFRAN-ODT) 4 MG TbDL, Take 2 tablets (8 mg total) by mouth every 8 (eight) hours as needed (nausea vomiting)., Disp: 30 tablet, Rfl: 0    promethazine (PHENERGAN) 12.5 MG Tab, Take 1 tablet (12.5 mg total) by mouth every 6 (six) hours as needed (nausea and vomiting)., Disp: 30 tablet, Rfl: 3    sevelamer carbonate (RENVELA) 800 mg Tab, Take 1 tablet (800 mg total) by mouth 3 (three) times daily with meals., Disp: 270 tablet, Rfl: 3    torsemide (DEMADEX) 20 MG Tab, Take 1 tablet by mouth once daily., Disp: , Rfl:     Physical Exam    Wt Readings from Last 3 Encounters:   01/24/24 86.1 kg (189 lb 13.1 oz)   01/10/24 87.1 kg (192 lb 0.3 oz)   01/10/24 86.6 kg (190 lb 14.7 oz)     Temp Readings from Last 3 Encounters:   01/24/24 98.1 °F (36.7 °C) (Temporal)   01/10/24 98.7 °F (37.1 °C) (Oral)   01/10/24 98.7 °F (37.1 °C)     BP Readings from Last 3 Encounters:   01/24/24 (!) 190/70   01/10/24 (!) 150/76   01/10/24 (!) 169/87     Pulse Readings from Last 3 Encounters:   01/24/24 81   01/10/24 94   01/10/24 98    VITAL SIGNS:  as above   GENERAL: appears well-built, well-nourished.  No anxiety, no agitation,  and in no distress.  Patient is awake, alert, oriented and cooperative.  HEENT:  Showed no congestion. Trachea is central no obvious icterus or pallor noted no hoarseness. no obvious JVD   NECK:  Supple.  No JVD. No obvious cervical submental or supraclavicular adenopathy.  RS:the visualized portion of  Chest expands well. chest appears symmetric, no audible wheezes.  No dyspnea recognized  ABDOMEN:  abdomen appears undistended.  EXTREMITIES:  Without edema.  NEUROLOGICAL:  The patient is appropriate, higher functions are normal.  No  obvious neurological deficits.  normal judgement normal thought content  No confusion, no speech impediment. Cranial nerves are intact and show no deficit. No gross motor deficits noted   SKIN MUSCULOSKELETAL: no joint or skeletal deformity, no clubbing of nails.  No visible rash ecchymosis or petechiae     Lab Results   Component Value Date    WBC 4.79 12/18/2023    HGB 10.3 (L) 12/18/2023    HCT 31.1 (L) 12/18/2023    MCV 88 12/18/2023    PLT 99 (L) 12/18/2023       BMP  Lab Results   Component Value Date     (L) 12/18/2023    K 4.4 12/18/2023    CL 96 12/18/2023    CO2 19 (L) 12/18/2023    BUN 69 (H) 12/18/2023    CREATININE 9.9 (H) 12/18/2023    CALCIUM 9.4 12/18/2023    ANIONGAP 13 12/18/2023    ESTGFRAFRICA 10.4 (A) 02/23/2022    EGFRNONAA 9.0 (A) 02/23/2022     Lab Results   Component Value Date    IRON 82 02/09/2022    TIBC 306 02/09/2022    FERRITIN 582 (H) 02/09/2022     Patient has polyclonal hypogammaglobulinemia  mponent Ref Range & Units 4 wk ago   Free Kappa Lt Chains,S 0.3300 - 1.94 mg/dL 26.3 High    Free Lambda Lt Chains,S 0.5700 - 2.63 mg/dL 18.5 High    Kappa/Lambda FLC Ratio 0.2600 - 1.65 1.42     Patient Active Problem List   Diagnosis    Type 2 diabetes mellitus with stage 5 chronic kidney disease    Essential hypertension    Hyperlipidemia, acquired    ED (erectile dysfunction)    History of diabetic ulcer of foot    Osteomyelitis of second toe of left  foot    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    Long term (current) use of antibiotics    Acute osteomyelitis of metatarsal bone of left foot    Diabetic ulcer of toe of left foot    Hyponatremia    Osteomyelitis of left foot    Hypoalbuminemia    Healed ulcer of left foot on examination    Iron deficiency anemia    Metabolic acidosis    Anemia due to chronic kidney disease, on chronic dialysis    Diabetic ulcer of left midfoot associated with type 2 diabetes mellitus, with fat layer exposed    Type II diabetes mellitus with neurological manifestations    Foot ulcer, right, with fat layer exposed    Foot ulceration, left, with fat layer exposed    ESRD on hemodialysis    Malignant renovascular hypertension    Calcified granuloma of lung    Abnormal findings on diagnostic imaging of lung    Tracheobronchomalacia    Intractable hiccups    Serum total bilirubin elevated    Hypertensive emergency    Lower extremity weakness    Abnormal MRI, lumbar spine    Goals of care, counseling/discussion    DJD (degenerative joint disease)    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 met transfer PET scan need biopsy will sent to Interventional Radiology ASAP  See me with path report  Patient is going to become homeless I have engaged navigator and     Intractable hiccups; going for nerve block    Pt moving back to live in [facility] will finish w/u here   See me with pet   ESRD : on dialysis  HTN : uncontrolled, seeing pcp was hospitalized  DM related foot ulcer, was on treatment some better now PET scan results to patient    Extended Care today over 55 minutes in involving and engaging , navigator due to patient's social situation as well as arranging for biopsy pathology explaining pet result to pt  Advance Care Planning     Date: 01/11/2024    Power of   I  initiated the process of voluntary advance care planning today and explained the importance of this process to the patient.  I introduced the concept of advance directives to the patient, as well. Then the patient received detailed information about the importance of designating a Health Care Power of  (HCPOA). He was also instructed to communicate with this person about their wishes for future healthcare, should he become sick and lose decision-making capacity.

## 2024-01-24 NOTE — TELEPHONE ENCOUNTER
Spoke to pt who stated he would only have transportation to get bx done in [facility]. LVM with main campus ir schedulers at [phone] to get appt scheduled.       ----- Message from Judy Flor RN sent at 1/24/2024  3:15 PM CST -----  Regarding: Procedure  Good afternoon,     We received a request for a CT LUNG BX for the attached patient. Per the patient, he will not have transportation available to pick him up due to him receiving sedation and not being allowed to drive for 24 hours. Therefore, we can not perform procedure. I explained this to him. He asked to have the order sent to a facility in [place] where he could possibly have transportation. Thanks for your time.       Judy Flor RN   [facility] Interventional Radiology/ Vascular Access Services

## 2024-01-24 NOTE — NURSING
Spoke to patient regarding need for  in order to complete procedure with sedation. Patient stated he had no one available and wanted to wait here. I explained they only keep you 3 hours and you are not allowed to drive for 24 hours. He asked that I have ordering provider send order to [place] where he could possibly have a ride. Message sent to Dr. Hill and staff.

## 2024-01-24 NOTE — Clinical Note
Get me IR biopsy asap oh supraclavicular or hilar Ln  Pt is going to be home less, have kimberlyn and  talk to pt to day or asap  See me with path report asp

## 2024-01-24 NOTE — PROGRESS NOTES
Patient reports that he will be homeless around 2/15/24; he is no longer able to continue to pay for housing at his current residence due to losing his job.  I contacted the [facility]; they currently have a waiting list and charge $350 per month.  Patient was also given the number to the [place] [facility].  Mr. Mcfadden stated that he is trying to move to [place] with his parents but has to sell his motorcycles to have money.  Patient given a $50 gift card to [place] [employer].

## 2024-01-25 DIAGNOSIS — C79.9 METASTATIC MALIGNANT NEOPLASM, UNSPECIFIED SITE: Primary | ICD-10-CM

## 2024-01-25 NOTE — NURSING
Spoke to Mr. Mcfadden he was advised that we are working on getting his CT biopsy done at [facility] orders updated per SWATHI Limon will notify IR team orders are in

## 2024-01-26 ENCOUNTER — HOSPITAL ENCOUNTER (OUTPATIENT)
Facility: OTHER | Age: 59
Discharge: HOME | End: 2024-01-26
Attending: ANESTHESIOLOGY | Admitting: ANESTHESIOLOGY
Payer: COMMERCIAL

## 2024-01-26 ENCOUNTER — TELEPHONE (OUTPATIENT)
Dept: PULMONOLOGY | Facility: CLINIC | Age: 59
End: 2024-01-26
Payer: COMMERCIAL

## 2024-01-26 DIAGNOSIS — J39.8 TRACHEOBRONCHOMALACIA: ICD-10-CM

## 2024-01-26 DIAGNOSIS — R06.6 INTRACTABLE HICCUPS: Primary | ICD-10-CM

## 2024-01-26 DIAGNOSIS — G89.29 CHRONIC PAIN: ICD-10-CM

## 2024-01-26 DIAGNOSIS — C78.01 MALIGNANT NEOPLASM METASTATIC TO BOTH LUNGS: Primary | ICD-10-CM

## 2024-01-26 DIAGNOSIS — C78.02 MALIGNANT NEOPLASM METASTATIC TO BOTH LUNGS: Primary | ICD-10-CM

## 2024-01-26 LAB — POCT GLUCOSE: 90 MG/DL (ref 70–110)

## 2024-01-26 PROCEDURE — 82947 ASSAY GLUCOSE BLOOD QUANT: CPT | Performed by: ANESTHESIOLOGY

## 2024-01-26 RX ORDER — SODIUM CHLORIDE 9 MG/ML
INJECTION, SOLUTION INTRAVENOUS CONTINUOUS
Status: DISCONTINUED | OUTPATIENT
Start: 2024-01-26 | End: 2024-01-26 | Stop reason: HOSPADM

## 2024-01-26 NOTE — PROGRESS NOTES
Patient procedure will be rescheduled as he had not stopped ASA 81mg and BP systolic>200    Victor Manuel Scott MD

## 2024-01-26 NOTE — TELEPHONE ENCOUNTER
Call and offered pt appointment to be seen by Dr. Malachi diaz.  Patient is agreeable and confirmed that he knew how to perform a virtual with his phone.  Appointment scheduled for 2/21 @ 8:30 AM.

## 2024-01-26 NOTE — PLAN OF CARE
Cx due to taking 81mg aspirin yesterday and BP of 212/95. Will be rescheduled to next Friday at 1pm

## 2024-01-30 ENCOUNTER — TELEPHONE (OUTPATIENT)
Dept: HEMATOLOGY/ONCOLOGY | Facility: CLINIC | Age: 59
End: 2024-01-30

## 2024-01-30 NOTE — PROGRESS NOTES
Called patient to follow-up regarding housing issues.  He stated that he has spoken to a [employer] member in [place] and he thinks he can stay there but won't know for sure until 2/15/24.  He stated that if he is able to move in, it would be easier for him to get treatment at [facility].  I sent an in-basket message to JUAN J Sargent, and Dr. Hill.  I told patient I would give him gas cards and talk to the dietician about signing him up for our food pantry.  He will call me when he is in the area so he can stop by to  gas cards and food.

## 2024-01-31 DIAGNOSIS — C78.01 MALIGNANT NEOPLASM METASTATIC TO BOTH LUNGS: Primary | ICD-10-CM

## 2024-01-31 DIAGNOSIS — C78.02 MALIGNANT NEOPLASM METASTATIC TO BOTH LUNGS: Primary | ICD-10-CM

## 2024-02-01 ENCOUNTER — TELEPHONE (OUTPATIENT)
Dept: PULMONOLOGY | Facility: CLINIC | Age: 59
End: 2024-02-01
Payer: COMMERCIAL

## 2024-02-01 NOTE — TELEPHONE ENCOUNTER
----- Message from Radha Brown sent at 2/1/2024  9:32 AM CST -----  Regarding: advice  Type:  Needs Medical Advice    Who Called: pt    Best Call Back Number: [phone]      Additional Information: pt wants a call back to discuss getting reschedule.  please call to discuss.

## 2024-02-02 ENCOUNTER — HOSPITAL ENCOUNTER (OUTPATIENT)
Facility: OTHER | Age: 59
Discharge: HOME | End: 2024-02-02
Attending: ANESTHESIOLOGY | Admitting: ANESTHESIOLOGY
Payer: COMMERCIAL

## 2024-02-02 VITALS
SYSTOLIC BLOOD PRESSURE: 156 MMHG | TEMPERATURE: 98 F | OXYGEN SATURATION: 98 % | WEIGHT: 185 LBS | DIASTOLIC BLOOD PRESSURE: 88 MMHG | HEART RATE: 86 BPM | HEIGHT: 73 IN | BODY MASS INDEX: 24.52 KG/M2 | RESPIRATION RATE: 16 BRPM

## 2024-02-02 DIAGNOSIS — R06.6 INTRACTABLE HICCUPS: Primary | ICD-10-CM

## 2024-02-02 DIAGNOSIS — G89.29 CHRONIC PAIN: ICD-10-CM

## 2024-02-02 DIAGNOSIS — M79.2 NEUROPATHIC PAIN: ICD-10-CM

## 2024-02-02 LAB — POCT GLUCOSE: 86 MG/DL (ref 70–110)

## 2024-02-02 PROCEDURE — 64510 N BLOCK STELLATE GANGLION: CPT | Mod: RT,,, | Performed by: ANESTHESIOLOGY

## 2024-02-02 PROCEDURE — 25000003 PHARM REV CODE 250: Performed by: ANESTHESIOLOGY

## 2024-02-02 PROCEDURE — 25000003 PHARM REV CODE 250: Performed by: STUDENT IN AN ORGANIZED HEALTH CARE EDUCATION/TRAINING PROGRAM

## 2024-02-02 PROCEDURE — 63600175 PHARM REV CODE 636 W HCPCS: Performed by: ANESTHESIOLOGY

## 2024-02-02 PROCEDURE — 76942 ECHO GUIDE FOR BIOPSY: CPT | Performed by: ANESTHESIOLOGY

## 2024-02-02 PROCEDURE — 64510 N BLOCK STELLATE GANGLION: CPT | Mod: RT | Performed by: ANESTHESIOLOGY

## 2024-02-02 RX ORDER — LIDOCAINE HYDROCHLORIDE 20 MG/ML
INJECTION, SOLUTION INFILTRATION; PERINEURAL
Status: DISCONTINUED | OUTPATIENT
Start: 2024-02-02 | End: 2024-02-02 | Stop reason: HOSPADM

## 2024-02-02 RX ORDER — SODIUM CHLORIDE 9 MG/ML
INJECTION, SOLUTION INTRAVENOUS CONTINUOUS
Status: DISCONTINUED | OUTPATIENT
Start: 2024-02-02 | End: 2024-02-02 | Stop reason: HOSPADM

## 2024-02-02 RX ORDER — MIDAZOLAM HYDROCHLORIDE 2 MG/2ML
INJECTION, SOLUTION INTRAMUSCULAR; INTRAVENOUS
Status: DISCONTINUED | OUTPATIENT
Start: 2024-02-02 | End: 2024-02-02 | Stop reason: HOSPADM

## 2024-02-02 RX ORDER — BUPIVACAINE HYDROCHLORIDE 2.5 MG/ML
INJECTION, SOLUTION EPIDURAL; INFILTRATION; INTRACAUDAL
Status: DISCONTINUED | OUTPATIENT
Start: 2024-02-02 | End: 2024-02-02 | Stop reason: HOSPADM

## 2024-02-02 RX ORDER — FENTANYL CITRATE 50 UG/ML
INJECTION, SOLUTION INTRAMUSCULAR; INTRAVENOUS
Status: DISCONTINUED | OUTPATIENT
Start: 2024-02-02 | End: 2024-02-02 | Stop reason: HOSPADM

## 2024-02-02 NOTE — OP NOTE
Diagnostic/Therapeutic Right Stellate Ganglion Block under Fluoroscopic Guidance    The procedure, risks, benefits, and options were discussed with the patient. There are no contraindications to the procedure. The patent expressed understanding and agreed to the procedure. Informed written consent was obtained prior to the start of the procedure and can be found in the patient's chart.    PATIENT NAME: Catalino Mcfadden   MRN: [id number]     DATE OF PROCEDURE: 02/02/2024    PROCEDURE: Diagnostic/Therapeutic Right Stellate Block under Fluoroscopic Guidance    PRE-OP DIAGNOSIS: Intractable hiccups [R06.6]    POST-OP DIAGNOSIS: Same    PHYSICIAN: Gokul Goznalez MD    ASSISTANTS: Philipp Holliday M.D. ([facility] Fellow)    MEDICATIONS INJECTED: Preservative-free Decadron 10mg with 9cc of Bupivacaine 0.25%     LOCAL ANESTHETIC INJECTED: Xylocaine 2%     SEDATION: Versed 2mg and Fentanyl 50mcg                                                                                                                                                                                     Conscious sedation ordered by M.D. Patient re-evaluation prior to administration of conscious sedation. No changes noted in patient's status from initial evaluation. The patient's vital signs were monitored by RN and patient remained hemodynamically stable throughout the procedure.    Event Time In   Sedation Start 1344   Sedation End 1352       ESTIMATED BLOOD LOSS: None    COMPLICATIONS: None    TECHNIQUE: Time-out was performed to identify the patient and procedure to be performed. With the patient laying in a supine position, the surgical area was prepped and draped in the usual sterile fashion using ChloraPrep and a fenestrated drape.The area was determined under fluoroscopy guidance. Skin anesthesia was achieved by injecting Lidocaine 2% over the injection site. Ultrasound guidance was used in order to avoid the neurovascular bundle.  This was followed by  advancement of a 21 gauge, 3.5 inch Stimuplex needle until contact with the transverse process at the level of C6 while retracting the carotid artery laterally. Once the final needle tip position was confirmed on fluoroscopy, negative pressure was applied to confirm no intravascular placement.  10 mL of the medication mixture listed above was injected slowly. The needles were removed and bleeding was nil. A sterile dressing was applied. No specimens collected. The patient tolerated the procedure well.       The patient was monitored after the procedure in the recovery area. They were given post-procedure and discharge instructions to follow at home. The patient was discharged in a stable condition.    Philipp Holliday MD     I reviewed and edited the fellow's note. I conducted my own interview and physical examination. I agree with the findings. I was present and supervising all critical portions of the procedure.    Gokul Gonzalez MD

## 2024-02-02 NOTE — DISCHARGE INSTRUCTIONS

## 2024-02-02 NOTE — DISCHARGE SUMMARY
Discharge Note  Short Stay      SUMMARY     Admit Date: 2/2/2024    Attending Physician: Gokul Gonzalez MD    Discharge Physician: Philipp Holliday MD      Discharge Date: 2/2/2024 1:42 PM    Procedure(s) (LRB):  BLOCK, NERVE STELLATE GANGLION *HOLD ASPIRIN 5 DAYS PRIOR* (N/A)    Final Diagnosis: Intractable hiccups [R06.6]    Disposition: Home or self care    Patient Instructions:   Current Discharge Medication List        CONTINUE these medications which have NOT CHANGED    Details   amitriptyline (ELAVIL) 10 MG tablet Take 1 tablet (10 mg total) by mouth every evening.  Qty: 30 tablet, Refills: 0      aspirin 81 MG Chew Take 1 tablet (81 mg total) by mouth once daily.  Qty: 30 tablet, Refills: 0      hydrALAZINE (APRESOLINE) 100 MG tablet Take 1 tablet (100 mg total) by mouth 3 (three) times daily.  Qty: 90 tablet, Refills: 0    Comments: .      methoxy peg-epoetin beta (MIRCERA INJ) Inject 50 mcg into the skin every 28 days.      NIFEdipine (PROCARDIA XL) 90 MG (OSM) 24 hr tablet Take 1 tablet (90 mg total) by mouth once daily.  Qty: 90 tablet, Refills: 3    Comments: .      ondansetron (ZOFRAN-ODT) 4 MG TbDL Take 2 tablets (8 mg total) by mouth every 8 (eight) hours as needed (nausea vomiting).  Qty: 30 tablet, Refills: 0      promethazine (PHENERGAN) 12.5 MG Tab Take 1 tablet (12.5 mg total) by mouth every 6 (six) hours as needed (nausea and vomiting).  Qty: 30 tablet, Refills: 3    Associated Diagnoses: Nausea and vomiting, unspecified vomiting type      sevelamer carbonate (RENVELA) 800 mg Tab Take 1 tablet (800 mg total) by mouth 3 (three) times daily with meals.  Qty: 270 tablet, Refills: 3    Associated Diagnoses: ESRD on hemodialysis      torsemide (DEMADEX) 20 MG Tab Take 1 tablet by mouth once daily.                 Discharge Diagnosis: Intractable hiccups [R06.6]  Condition on Discharge: Stable with no complications to procedure   Diet on Discharge: Same as before.  Activity: as per instruction  sheet.  Discharge to: Home with a responsible adult.  Follow up: 2-4 weeks       Please call my office or pager at [phone] if experienced any weakness or loss of sensation, fever > 101.5, pain uncontrolled with oral medications, persistent nausea/vomiting/or diarrhea, redness or drainage from the incisions, or any other worrisome concerns. If physician on call was not reached or could not communicate with our office for any reason please go to the nearest emergency department      Philipp Holliday M.D.  PGY-5  Interventional Pain Management Fellow  [facility]  Pager: [phone]    02/02/2024

## 2024-02-10 NOTE — CARE UPDATE
12/15/23 0842   Patient Assessment/Suction   Level of Consciousness (AVPU) alert   Respiratory Effort Normal;Unlabored   Expansion/Accessory Muscles/Retractions no use of accessory muscles;no retractions;expansion symmetric   All Lung Fields Breath Sounds Anterior:;Lateral:;clear   Rhythm/Pattern, Respiratory unlabored;pattern regular;depth regular;no shortness of breath reported   PRE-TX-O2   Device (Oxygen Therapy) room air   SpO2 100 %   Pulse Oximetry Type Intermittent   $ Pulse Oximetry - Multiple Charge Pulse Oximetry - Multiple   Pulse 86   Resp 16   Aerosol Therapy   $ Aerosol Therapy Charges PRN treatment not required   Respiratory Treatment Status (SVN) PRN treatment not required        GENERAL: A&Ox4, non-toxic appearing, no acute distress  HEENT: NCAT, EOMI, oral mucosa moist, normal conjunctiva  RESP: CTAB, no respiratory distress, no wheezes/rhonchi/rales, speaking in full sentences  CV: RRR, no murmurs/rubs/gallops  ABDOMEN: RLQ tenderness with voluntary guarding, +Rovsing's sign and +Rodriguez's sign  MSK: no visible deformities  NEURO: no focal sensory or motor deficits, CN 2-12 grossly intact  SKIN: warm, normal color, well perfused, no rash  PSYCH: normal affect

## 2024-02-20 ENCOUNTER — TELEPHONE (OUTPATIENT)
Dept: PULMONOLOGY | Facility: CLINIC | Age: 59
End: 2024-02-20
Payer: COMMERCIAL

## 2024-02-20 ENCOUNTER — OFFICE VISIT (OUTPATIENT)
Dept: INTERVENTIONAL RADIOLOGY/VASCULAR | Facility: CLINIC | Age: 59
End: 2024-02-20
Payer: COMMERCIAL

## 2024-02-20 DIAGNOSIS — C79.51 METASTASIS TO BONE: Primary | ICD-10-CM

## 2024-02-20 PROCEDURE — 1160F RVW MEDS BY RX/DR IN RCRD: CPT | Mod: CPTII,95,, | Performed by: PHYSICIAN ASSISTANT

## 2024-02-20 PROCEDURE — 1159F MED LIST DOCD IN RCRD: CPT | Mod: CPTII,95,, | Performed by: PHYSICIAN ASSISTANT

## 2024-02-20 PROCEDURE — 99203 OFFICE O/P NEW LOW 30 MIN: CPT | Mod: 95,,, | Performed by: PHYSICIAN ASSISTANT

## 2024-02-20 NOTE — TELEPHONE ENCOUNTER
Left message on pt voicemail, informing him that I'm contacting him in regards to rescheduling his appointment with Dr Ly for 8:00 am instead of 8:30 am. I also advised pt that if he wants to reschedule his appointment or if he has any questions or concerns, he may contact the office.

## 2024-02-20 NOTE — PROGRESS NOTES
"Subjective     Patient ID: Catalino Mcfadden is a 58 y.o. male.    Chief Complaint: Lesion    Virtual visit with patient referred to Interventional Radiology by Dr. Nunu Hill for evaluation of LEFT iliac biopsy.  58 y.o. male with significant past medical history of diabetes gastroparesis end-stage renal disease on dialysis.  PET 1/19 "Hypermetabolic lymphadenopathy throughout supraclavicular region, mediastinum, bilateral steven, and involving upper abdomen.  Numerous tracer avid hepatic and splenic lesions.  Numerous tracer avid osseous lesions.  Overall findings are concerning for diffuse metastasis.  Index lesions as above.  Prominent tracer avid foci with probable associated nodular soft tissue in the transverse colon, could represent underlying primary neoplastic process.  Further evaluation with colonoscopy as clinically warranted.  Similar pulmonary micro nodules with compared to previous CT chest, too small to characterize with PET."     Pt reports at baseline, has intractable hiccups.  Pt denies chills, fever, unexpected wt change, CP, SOB, abdominal pain, N/V/D, confusion.     Patient denies AC or antiplatelet use.  Off aspirin x 1 month.  Pt denies issues laying flat. denies CPAP use or O2 use at home  Oncology and PCP notes reviewed.      Review of Systems   Constitutional:  Negative for activity change, appetite change, chills, fatigue, fever and unexpected weight change.   Respiratory:  Negative for cough and shortness of breath.    Cardiovascular:  Negative for chest pain and leg swelling.   Gastrointestinal:  Negative for abdominal distention, abdominal pain, constipation, diarrhea, nausea and vomiting.   Genitourinary:  Negative for difficulty urinating and flank pain.   Musculoskeletal:  Negative for back pain and gait problem.   Neurological:  Negative for weakness and memory loss.   Psychiatric/Behavioral:  Negative for confusion and sleep disturbance.           Objective     Physical " Exam  Constitutional:       General: He is not in acute distress.     Appearance: He is well-developed. He is not diaphoretic.   HENT:      Head: Normocephalic and atraumatic.   Pulmonary:      Effort: Pulmonary effort is normal. No respiratory distress.   Neurological:      Mental Status: He is alert and oriented to person, place, and time.   Psychiatric:         Behavior: Behavior normal.         Thought Content: Thought content normal.         Judgment: Judgment normal.     EXAMINATION:  NM PET CT FDG WHOLE BODY     CLINICAL HISTORY:  Hematologic malignancy, assess treatment response; Secondary malignant neoplasm of unspecified site     TECHNIQUE:  11.47 mCi of F18-FDG was administered intravenously in the right antecubital fossa.  After an approximately 60 min distribution time, PET/CT images were acquired from the vertex to toe.  Transmission images were acquired to correct for attenuation using a whole body low-dose CT scan without IV contrast with the arms positioned along the torso.  Glycemia at the time of injection was 96 mg/dL.     COMPARISON:  12/12/2023     CT chest 03/22/2023     FINDINGS:  Quality of the study: Adequate.     In the head and neck, there are multiple hypermetabolic bilateral lower cervical chain lymph nodes.  For example, left supraclavicular lymph node with increased tracer uptake measuring 1.3 cm (3-95) and SUV max 4.4.     In the chest, there are numerous hypermetabolic mediastinal and bilateral hilar lymph nodes.  For example, aortopulmonary window lymph node (3-122) measuring 1.0 cm with SUV max 5.9.  There are additional tracer avid lymph nodes along left internal mammary chain (axial fused image 132)     Right central venous catheter tip in right atrium.  Redemonstration of diffuse right upper and middle lobe micro nodules and tree-in-bud nodules, too small to characterize on PET.     In the abdomen and pelvis, there is heterogeneous appearance of liver with innumerable  hypermetabolic foci.  Difficult to measure.  For example, hypermetabolic focus within the inferior aspect of right hepatic lobe with SUV max 6.1 (axial fused image 203).     Spleen is enlarged with multiple hypermetabolic foci.  For example, saw hypoattenuating lesion measuring approximately 4 cm with SUV max 11 (axial fused image 186).     Few prominent to mildly enlarged upper abdominal lymph nodes with mildly increased uptake.  For example, 1.1 cm peripancreatic lymph node with SUV max 3.4 (axial fused image 202).     There are 2 prominent hypermetabolic foci within loop of colon in the right anterior abdomen noting nearby areas of nodular soft tissue and probable mild misregistration, SUV max measures 7.9 on image 220 and 20 on image 214.     Left lower anterior abdomen subcutaneous hyperdense nodule with low-level tracer uptake likely representing an injection granuloma.     Hepatosplenomegaly and prostatomegaly again noted.     In the bones, there are numerous tracer avid osseous lesions.  For example: Prominent tracer avid lesion in the left sacrum with SUV max 7.9, no obvious CT correlate (axial fused image 260).  Left proximal humerus lesion SUV max 5.1, no obvious CT correlate (axial fused image 100).     In the extremities, there are no hypermetabolic lesions worrisome for malignancy.     Impression:     Hypermetabolic lymphadenopathy throughout supraclavicular region, mediastinum, bilateral steven, and involving upper abdomen.  Numerous tracer avid hepatic and splenic lesions.  Numerous tracer avid osseous lesions.  Overall findings are concerning for diffuse metastasis.  Index lesions as above.     Prominent tracer avid foci with probable associated nodular soft tissue in the transverse colon, could represent underlying primary neoplastic process.  Further evaluation with colonoscopy as clinically warranted.     Similar pulmonary micro nodules with compared to previous CT chest, too small to characterize  with PET.     Additional findings as above.     This report was flagged in Epic as abnormal.     Electronically signed by resident: Jenny Reid  Date:                                            01/19/2024  Time:                                           16:03     Electronically signed by: Trey Molina  Date:                                            01/22/2024  Time:                                           11:21     Assessment and Plan     1. Metastasis to bone  -     CBC Auto Differential; Future; Expected date: 02/20/2024  -     Protime-INR; Future; Expected date: 02/20/2024    The patient location is: [place]  The chief complaint leading to consultation is: bone lesion    Visit type: audiovisual    Face to Face time with patient: 15  30 minutes of total time spent on the encounter, which includes face to face time and non-face to face time preparing to see the patient (eg, review of tests), Obtaining and/or reviewing separately obtained history, Documenting clinical information in the electronic or other health record, Independently interpreting results (not separately reported) and communicating results to the patient/family/caregiver, or Care coordination (not separately reported).     Each patient to whom he or she provides medical services by telemedicine is:  (1) informed of the relationship between the physician and patient and the respective role of any other health care provider with respect to management of the patient; and (2) notified that he or she may decline to receive medical services by telemedicine and may withdraw from such care at any time.    Notes:      Discussed how the procedure L iliac bone biopsy will be performed, risks (including, but not limited to, pain, bleeding, infection, damage to nearby structures, and the need for additional procedures), benefits, possible complications, pre-post procedure expectations, and alternatives. The patient voices understanding and all questions  have been answered.  The patient agrees to proceed as planned.     Patient pending scheduling of L iliac bone biopsy with moderate sedation, approved by Dr. Sams.  2.  Pre-procedure labs to be scheduled [facility].  3.  Pt denies taking AC, antiplatelet medication, or other medications containing asa.  Off aspirin for 1 month now.  4.  Allergies reviewed, NKDA.  5.  Pre-procedure handout with clinic phone number provided.  Pt advised to call if any further questions or need to reschedule.  Discussed with patient our nursing team will call the day before the procedure with reminder for instructions.          No follow-ups on file.

## 2024-02-21 ENCOUNTER — OFFICE VISIT (OUTPATIENT)
Dept: PULMONOLOGY | Facility: CLINIC | Age: 59
End: 2024-02-21
Payer: COMMERCIAL

## 2024-02-21 DIAGNOSIS — R59.0 MEDIASTINAL ADENOPATHY: Primary | ICD-10-CM

## 2024-02-21 DIAGNOSIS — C79.51 METASTASIS TO BONE: ICD-10-CM

## 2024-02-21 DIAGNOSIS — C78.01 MALIGNANT NEOPLASM METASTATIC TO BOTH LUNGS: ICD-10-CM

## 2024-02-21 DIAGNOSIS — C78.02 MALIGNANT NEOPLASM METASTATIC TO BOTH LUNGS: ICD-10-CM

## 2024-02-21 DIAGNOSIS — R59.1 LYMPHADENOPATHY: Primary | ICD-10-CM

## 2024-02-21 PROCEDURE — 1160F RVW MEDS BY RX/DR IN RCRD: CPT | Mod: CPTII,95,, | Performed by: INTERNAL MEDICINE

## 2024-02-21 PROCEDURE — 1159F MED LIST DOCD IN RCRD: CPT | Mod: CPTII,95,, | Performed by: INTERNAL MEDICINE

## 2024-02-21 PROCEDURE — 99214 OFFICE O/P EST MOD 30 MIN: CPT | Mod: 95,,, | Performed by: INTERNAL MEDICINE

## 2024-02-21 NOTE — PROGRESS NOTES
Subjective:       Patient ID: Catalino Mcafdden is a 58 y.o. male.    The patient location is: [facility]  The chief complaint leading to consultation is: Consult from Dr. Nunu Hill for EBUS evaluation     Visit type: audiovisual    Face to Face time with patient: 20  30 minutes of total time spent on the encounter, which includes face to face time and non-face to face time preparing to see the patient (eg, review of tests), Obtaining and/or reviewing separately obtained history, Documenting clinical information in the electronic or other health record, Independently interpreting results (not separately reported) and communicating results to the patient/family/caregiver, or Care coordination (not separately reported).         Each patient to whom he or she provides medical services by telemedicine is:  (1) informed of the relationship between the physician and patient and the respective role of any other health care provider with respect to management of the patient; and (2) notified that he or she may decline to receive medical services by telemedicine and may withdraw from such care at any time.    Notes:      Chief Complaint: Abnormal CT of chest.  Consult from Dr. Nunu Hill    59 yo with h/o ESRD secondary to DM, neuropathy who underwent MRI of spine with areas concerning for bone metastasis.  CT of chest and PET with mediastinal adenopathy.      Patient has recently undergone procedures and has no personal h/o anesthesia complications.      Other than back pain, no respiratory complaints.      Review of Systems   Constitutional:  Negative for fever and weight loss.   HENT:  Negative for trouble swallowing.    Respiratory:  Negative for dyspnea on extertion.    Cardiovascular:  Negative for chest pain and leg swelling.   Gastrointestinal:  Negative for acid reflux.   Neurological:  Negative for headaches.   Hematological:  Negative for adenopathy.   Psychiatric/Behavioral:  Negative for confusion.      "   No personal h/o anesthesia comoplications.    Objective:      Physical Exam   Constitutional: He is oriented to person, place, and time. He appears well-developed and well-nourished.   HENT:   Head: Normocephalic.   Lymphadenopathy: No supraclavicular adenopathy is present.     He has no cervical adenopathy.   Neurological: He is alert and oriented to person, place, and time.   Psychiatric: He has a normal mood and affect.   Personal Diagnostic Review    MRI of spin 12/2023  . There is no acute or significant abnormality within the thoracic spine.  There is no fracture.  The spinal canal may be borderline small on a developmental basis but there is no significant spinal stenosis and there is no cord compression.  2. As seen on lumbar spine MRI performed earlier today, there is abnormal marrow signal intensity and superior L1 on the left as well as in the right L1 transverse process with imaging findings worrisome for possible osseous metastatic disease.      Diffuse mediastinal adenopathy, multiple pulmonary nodules.         2/2/2024     2:10 PM 2/2/2024     1:55 PM 2/2/2024     1:50 PM 2/2/2024     1:45 PM 2/2/2024     1:40 PM 2/2/2024    12:48 PM 1/24/2024    11:17 AM   Pulmonary Function Tests   SpO2 98 % 98 % 100 % 100 % 100 % 99 % 100 %   Height      6' 1" (1.854 m) 6' 1.5" (1.867 m)   Weight      83.9 kg (185 lb) 86.1 kg (189 lb 13.1 oz)   BMI (Calculated)      24.4 24.7         Assessment:       1. Mediastinal adenopathy    2. Malignant neoplasm metastatic to both lungs    3. Metastasis to bone        Outpatient Encounter Medications as of 2/21/2024   Medication Sig Dispense Refill    amitriptyline (ELAVIL) 10 MG tablet Take 1 tablet (10 mg total) by mouth every evening. 30 tablet 0    aspirin 81 MG Chew Take 1 tablet (81 mg total) by mouth once daily. 30 tablet 0    hydrALAZINE (APRESOLINE) 100 MG tablet Take 1 tablet (100 mg total) by mouth 3 (three) times daily. 90 tablet 0    methoxy peg-epoetin beta " (MIRCERA INJ) Inject 50 mcg into the skin every 28 days.      NIFEdipine (PROCARDIA XL) 90 MG (OSM) 24 hr tablet Take 1 tablet (90 mg total) by mouth once daily. 90 tablet 3    ondansetron (ZOFRAN-ODT) 4 MG TbDL Take 2 tablets (8 mg total) by mouth every 8 (eight) hours as needed (nausea vomiting). 30 tablet 0    promethazine (PHENERGAN) 12.5 MG Tab Take 1 tablet (12.5 mg total) by mouth every 6 (six) hours as needed (nausea and vomiting). 30 tablet 3    sevelamer carbonate (RENVELA) 800 mg Tab Take 1 tablet (800 mg total) by mouth 3 (three) times daily with meals. 270 tablet 3    torsemide (DEMADEX) 20 MG Tab Take 1 tablet by mouth once daily.       No facility-administered encounter medications on file as of 2/21/2024.     Orders Placed This Encounter   Procedures    Pulse Oximetry Q4H     Standing Status:   Standing     Number of Occurrences:   21    Case Request Operating Room: ENDOBRONCHIAL ULTRASOUND (EBUS)     Standing Status:   Standing     Number of Occurrences:   1     Order Specific Question:   Medical Necessity:     Answer:   Medically Urgent [101]     Order Specific Question:   CPT Code:     Answer:   ME BRONCH W/ EBUS, SAMPLING 3 OR MORE NODES, INCL GUIDE [13703]     Order Specific Question:   Post-Procedure Disposition:     Answer:   Amb Surgery/DOSC [2]     Order Specific Question:   Is an on-site pathologist required for this procedure?     Answer:   N/A     Plan:     Problem List Items Addressed This Visit       Metastasis to bone    Relevant Orders    Pulse Oximetry Q4H    Metastasis to lung     Other Visit Diagnoses       Mediastinal adenopathy    -  Primary    Relevant Orders    Pulse Oximetry Q4H    Case Request Operating Room: ENDOBRONCHIAL ULTRASOUND (EBUS) (Completed)        Diagnostic EBUS with BAL on right.  Will need to rule out lymphoproliferative d/o as well.      Patient is scheduled with Dr Fleming, Friday March 1.    Oriented to procedure, arrival time given instructions that he  should not eat past midnight day of procedure already given but we will call to confirm.    Consent will be signed on day of procedure.

## 2024-02-21 NOTE — H&P (VIEW-ONLY)
Subjective:       Patient ID: Catalino Mcfadden is a 58 y.o. male.    The patient location is: [facility]  The chief complaint leading to consultation is: Consult from Dr. Nunu Hill for EBUS evaluation     Visit type: audiovisual    Face to Face time with patient: 20  30 minutes of total time spent on the encounter, which includes face to face time and non-face to face time preparing to see the patient (eg, review of tests), Obtaining and/or reviewing separately obtained history, Documenting clinical information in the electronic or other health record, Independently interpreting results (not separately reported) and communicating results to the patient/family/caregiver, or Care coordination (not separately reported).         Each patient to whom he or she provides medical services by telemedicine is:  (1) informed of the relationship between the physician and patient and the respective role of any other health care provider with respect to management of the patient; and (2) notified that he or she may decline to receive medical services by telemedicine and may withdraw from such care at any time.    Notes:      Chief Complaint: Abnormal CT of chest.  Consult from Dr. Nunu Hill    57 yo with h/o ESRD secondary to DM, neuropathy who underwent MRI of spine with areas concerning for bone metastasis.  CT of chest and PET with mediastinal adenopathy.      Patient has recently undergone procedures and has no personal h/o anesthesia complications.      Other than back pain, no respiratory complaints.      Review of Systems   Constitutional:  Negative for fever and weight loss.   HENT:  Negative for trouble swallowing.    Respiratory:  Negative for dyspnea on extertion.    Cardiovascular:  Negative for chest pain and leg swelling.   Gastrointestinal:  Negative for acid reflux.   Neurological:  Negative for headaches.   Hematological:  Negative for adenopathy.   Psychiatric/Behavioral:  Negative for confusion.      "   No personal h/o anesthesia comoplications.    Objective:      Physical Exam   Constitutional: He is oriented to person, place, and time. He appears well-developed and well-nourished.   HENT:   Head: Normocephalic.   Lymphadenopathy: No supraclavicular adenopathy is present.     He has no cervical adenopathy.   Neurological: He is alert and oriented to person, place, and time.   Psychiatric: He has a normal mood and affect.   Personal Diagnostic Review    MRI of spin 12/2023  . There is no acute or significant abnormality within the thoracic spine.  There is no fracture.  The spinal canal may be borderline small on a developmental basis but there is no significant spinal stenosis and there is no cord compression.  2. As seen on lumbar spine MRI performed earlier today, there is abnormal marrow signal intensity and superior L1 on the left as well as in the right L1 transverse process with imaging findings worrisome for possible osseous metastatic disease.      Diffuse mediastinal adenopathy, multiple pulmonary nodules.         2/2/2024     2:10 PM 2/2/2024     1:55 PM 2/2/2024     1:50 PM 2/2/2024     1:45 PM 2/2/2024     1:40 PM 2/2/2024    12:48 PM 1/24/2024    11:17 AM   Pulmonary Function Tests   SpO2 98 % 98 % 100 % 100 % 100 % 99 % 100 %   Height      6' 1" (1.854 m) 6' 1.5" (1.867 m)   Weight      83.9 kg (185 lb) 86.1 kg (189 lb 13.1 oz)   BMI (Calculated)      24.4 24.7         Assessment:       1. Mediastinal adenopathy    2. Malignant neoplasm metastatic to both lungs    3. Metastasis to bone        Outpatient Encounter Medications as of 2/21/2024   Medication Sig Dispense Refill    amitriptyline (ELAVIL) 10 MG tablet Take 1 tablet (10 mg total) by mouth every evening. 30 tablet 0    aspirin 81 MG Chew Take 1 tablet (81 mg total) by mouth once daily. 30 tablet 0    hydrALAZINE (APRESOLINE) 100 MG tablet Take 1 tablet (100 mg total) by mouth 3 (three) times daily. 90 tablet 0    methoxy peg-epoetin beta " (MIRCERA INJ) Inject 50 mcg into the skin every 28 days.      NIFEdipine (PROCARDIA XL) 90 MG (OSM) 24 hr tablet Take 1 tablet (90 mg total) by mouth once daily. 90 tablet 3    ondansetron (ZOFRAN-ODT) 4 MG TbDL Take 2 tablets (8 mg total) by mouth every 8 (eight) hours as needed (nausea vomiting). 30 tablet 0    promethazine (PHENERGAN) 12.5 MG Tab Take 1 tablet (12.5 mg total) by mouth every 6 (six) hours as needed (nausea and vomiting). 30 tablet 3    sevelamer carbonate (RENVELA) 800 mg Tab Take 1 tablet (800 mg total) by mouth 3 (three) times daily with meals. 270 tablet 3    torsemide (DEMADEX) 20 MG Tab Take 1 tablet by mouth once daily.       No facility-administered encounter medications on file as of 2/21/2024.     Orders Placed This Encounter   Procedures    Pulse Oximetry Q4H     Standing Status:   Standing     Number of Occurrences:   21    Case Request Operating Room: ENDOBRONCHIAL ULTRASOUND (EBUS)     Standing Status:   Standing     Number of Occurrences:   1     Order Specific Question:   Medical Necessity:     Answer:   Medically Urgent [101]     Order Specific Question:   CPT Code:     Answer:   IN BRONCH W/ EBUS, SAMPLING 3 OR MORE NODES, INCL GUIDE [88277]     Order Specific Question:   Post-Procedure Disposition:     Answer:   Amb Surgery/DOSC [2]     Order Specific Question:   Is an on-site pathologist required for this procedure?     Answer:   N/A     Plan:     Problem List Items Addressed This Visit       Metastasis to bone    Relevant Orders    Pulse Oximetry Q4H    Metastasis to lung     Other Visit Diagnoses       Mediastinal adenopathy    -  Primary    Relevant Orders    Pulse Oximetry Q4H    Case Request Operating Room: ENDOBRONCHIAL ULTRASOUND (EBUS) (Completed)        Diagnostic EBUS with BAL on right.  Will need to rule out lymphoproliferative d/o as well.      Patient is scheduled with Dr Fleming, Friday March 1.    Oriented to procedure, arrival time given instructions that he  should not eat past midnight day of procedure already given but we will call to confirm.    Consent will be signed on day of procedure.

## 2024-02-23 ENCOUNTER — TELEPHONE (OUTPATIENT)
Dept: PULMONOLOGY | Facility: CLINIC | Age: 59
End: 2024-02-23
Payer: COMMERCIAL

## 2024-02-23 NOTE — TELEPHONE ENCOUNTER
Phone call to patient to confirm appointment and instructions given in clinic for EBUS/Robotic procedure scheduled for 02/23/24 .   Patient instructed to arrive to the 2nd floor [facility] check in desk at 0900 on 02/23/24  with designated .  NPO after midnight the night prior to scheduled procedure. Patient stated not currently taking any GLP1 or antiplatelet/anticoags.  Patient verbalizes understanding of all above instructions given.

## 2024-02-26 ENCOUNTER — HOSPITAL ENCOUNTER (OUTPATIENT)
Dept: PREADMISSION TESTING | Facility: HOSPITAL | Age: 59
Discharge: HOME | End: 2024-02-26
Attending: NURSE PRACTITIONER
Payer: COMMERCIAL

## 2024-02-26 DIAGNOSIS — Z01.818 PREOPERATIVE TESTING: Primary | ICD-10-CM

## 2024-02-26 LAB
ALBUMIN SERPL BCP-MCNC: 2.6 G/DL (ref 3.5–5.2)
ALP SERPL-CCNC: 647 U/L (ref 55–135)
ALT SERPL W/O P-5'-P-CCNC: 26 U/L (ref 10–44)
ANION GAP SERPL CALC-SCNC: 9 MMOL/L (ref 8–16)
AST SERPL-CCNC: 43 U/L (ref 10–40)
BASOPHILS # BLD AUTO: 0.03 K/UL (ref 0–0.2)
BASOPHILS NFR BLD: 0.7 % (ref 0–1.9)
BILIRUB SERPL-MCNC: 1.2 MG/DL (ref 0.1–1)
BUN SERPL-MCNC: 23 MG/DL (ref 6–20)
CALCIUM SERPL-MCNC: 9.5 MG/DL (ref 8.7–10.5)
CHLORIDE SERPL-SCNC: 94 MMOL/L (ref 95–110)
CO2 SERPL-SCNC: 28 MMOL/L (ref 23–29)
CREAT SERPL-MCNC: 5.8 MG/DL (ref 0.5–1.4)
DIFFERENTIAL METHOD BLD: ABNORMAL
EOSINOPHIL # BLD AUTO: 0.1 K/UL (ref 0–0.5)
EOSINOPHIL NFR BLD: 3.2 % (ref 0–8)
ERYTHROCYTE [DISTWIDTH] IN BLOOD BY AUTOMATED COUNT: 15.2 % (ref 11.5–14.5)
EST. GFR  (NO RACE VARIABLE): 11 ML/MIN/1.73 M^2
GLUCOSE SERPL-MCNC: 71 MG/DL (ref 70–110)
HCT VFR BLD AUTO: 31.8 % (ref 40–54)
HGB BLD-MCNC: 10.3 G/DL (ref 14–18)
IMM GRANULOCYTES # BLD AUTO: 0.02 K/UL (ref 0–0.04)
IMM GRANULOCYTES NFR BLD AUTO: 0.5 % (ref 0–0.5)
INR PPP: 1.1 (ref 0.8–1.2)
LYMPHOCYTES # BLD AUTO: 0.6 K/UL (ref 1–4.8)
LYMPHOCYTES NFR BLD: 15.6 % (ref 18–48)
MCH RBC QN AUTO: 28.1 PG (ref 27–31)
MCHC RBC AUTO-ENTMCNC: 32.4 G/DL (ref 32–36)
MCV RBC AUTO: 87 FL (ref 82–98)
MONOCYTES # BLD AUTO: 0.6 K/UL (ref 0.3–1)
MONOCYTES NFR BLD: 14.9 % (ref 4–15)
NEUTROPHILS # BLD AUTO: 2.7 K/UL (ref 1.8–7.7)
NEUTROPHILS NFR BLD: 65.1 % (ref 38–73)
NRBC BLD-RTO: 0 /100 WBC
PLATELET # BLD AUTO: 85 K/UL (ref 150–450)
PMV BLD AUTO: 9.4 FL (ref 9.2–12.9)
POTASSIUM SERPL-SCNC: 4 MMOL/L (ref 3.5–5.1)
PROT SERPL-MCNC: 6.7 G/DL (ref 6–8.4)
PROTHROMBIN TIME: 12.1 SEC (ref 9–12.5)
RBC # BLD AUTO: 3.67 M/UL (ref 4.6–6.2)
SODIUM SERPL-SCNC: 131 MMOL/L (ref 136–145)
WBC # BLD AUTO: 4.1 K/UL (ref 3.9–12.7)

## 2024-02-26 PROCEDURE — 85610 PROTHROMBIN TIME: CPT | Performed by: SURGERY

## 2024-02-26 PROCEDURE — 85025 COMPLETE CBC W/AUTO DIFF WBC: CPT | Performed by: SURGERY

## 2024-02-26 PROCEDURE — 80053 COMPREHEN METABOLIC PANEL: CPT | Performed by: SURGERY

## 2024-02-26 NOTE — DISCHARGE INSTRUCTIONS
YOUR PROCEDURE WILL BE AT [facility] at [address] [place] [address]           Before 7 AM, enter through the Emergency Entrance..   After 7 AM enter through the Main Entrance.       Report to the Same Day Surgery Registration Desk in the hallway.(Just beside the Same Day Surgery Unit)      Your procedure  is scheduled for ___2/29/2024_______.   Arrive in Same Day Surgery at __9:30 am__________    You may have two visitors.  No children under 12 years old.   You will be going to the Same Day Surgery Unit on the 2nd floor of the hospital.    Important instructions:  Do not eat anything after midnight.         SEE MEDICATION SHEET.   TAKE MEDICATIONS AS DIRECTED WITH SIPS OF WATER.      STOP taking Aspirin, Ibuprofen,  Advil, Motrin, Mobic(meloxicam), Aleve (naproxen), Fish oil, and Vitamin E for at least 7 days before your surgery.     You may take Tylenol if needed which is not a blood thinner.    Please shower the night before and the morning of your surgery.        Use Chlorhexidine soap as instructed by your pre op nurse.   Please place clean linens on your bed the night before surgery. Please wear fresh clean clothing after each shower.    No shaving of procedural area at least 4-5 days before surgery due to increased risk of skin irritation and/or possible infection.    Contact lenses and removable denture work may not be worn during your procedure.    You may wear deodorant only. If you are having breast surgery, do not wear deodorant on the operative side.    Do not wear powder, body lotion, perfume/cologne or make-up.    Do not wear any jewelry or have any metal on your body.    You will be asked to remove any dentures or partials for the procedure.    If you are going home on the same day of surgery, you must arrange for a family member or a friend to drive you home.  Public transportation is prohibited.  You will not be able to drive home if you were given anesthesia  or sedation.    Patients who want to have their Post-op prescriptions filled from our in-house [facility], bring a Credit/Debit Card  or cash with you. A co-pay may be required.  The pharmacy closes at 5:30 pm.    Children under 18 years of age require a parent/guardian present the entire time that they are here.    Wear loose fitting clothes allowing for bandages.    Please leave money and valuables home.      You may bring your cell phone.    Call the doctor if fever or illness should occur before your surgery.    Call [phone] to contact us here if needed.

## 2024-02-28 ENCOUNTER — TELEPHONE (OUTPATIENT)
Dept: HEMATOLOGY/ONCOLOGY | Facility: CLINIC | Age: 59
End: 2024-02-28
Payer: COMMERCIAL

## 2024-02-28 ENCOUNTER — OFFICE VISIT (OUTPATIENT)
Dept: VASCULAR SURGERY | Facility: CLINIC | Age: 59
End: 2024-02-28
Payer: COMMERCIAL

## 2024-02-28 ENCOUNTER — TELEPHONE (OUTPATIENT)
Dept: VASCULAR SURGERY | Facility: CLINIC | Age: 59
End: 2024-02-28

## 2024-02-28 ENCOUNTER — ANESTHESIA EVENT (OUTPATIENT)
Dept: SURGERY | Facility: HOSPITAL | Age: 59
End: 2024-02-28
Payer: COMMERCIAL

## 2024-02-28 ENCOUNTER — OFFICE VISIT (OUTPATIENT)
Dept: PODIATRY | Facility: CLINIC | Age: 59
End: 2024-02-28
Payer: COMMERCIAL

## 2024-02-28 ENCOUNTER — DOCUMENTATION ONLY (OUTPATIENT)
Dept: INTERVENTIONAL RADIOLOGY/VASCULAR | Facility: HOSPITAL | Age: 59
End: 2024-02-28
Payer: COMMERCIAL

## 2024-02-28 VITALS
DIASTOLIC BLOOD PRESSURE: 91 MMHG | HEIGHT: 73 IN | WEIGHT: 184.94 LBS | SYSTOLIC BLOOD PRESSURE: 198 MMHG | HEART RATE: 85 BPM | BODY MASS INDEX: 24.51 KG/M2

## 2024-02-28 VITALS
WEIGHT: 192.69 LBS | HEART RATE: 72 BPM | BODY MASS INDEX: 25.54 KG/M2 | HEIGHT: 73 IN | DIASTOLIC BLOOD PRESSURE: 90 MMHG | SYSTOLIC BLOOD PRESSURE: 210 MMHG

## 2024-02-28 DIAGNOSIS — L84 PRE-ULCERATIVE CALLUSES: ICD-10-CM

## 2024-02-28 DIAGNOSIS — E11.22 TYPE 2 DIABETES MELLITUS WITH STAGE 5 CHRONIC KIDNEY DISEASE: Primary | ICD-10-CM

## 2024-02-28 DIAGNOSIS — N18.5 TYPE 2 DIABETES MELLITUS WITH STAGE 5 CHRONIC KIDNEY DISEASE: Primary | ICD-10-CM

## 2024-02-28 DIAGNOSIS — N18.6 ESRD (END STAGE RENAL DISEASE): ICD-10-CM

## 2024-02-28 DIAGNOSIS — M20.5X2 HALLUX LIMITUS, ACQUIRED, LEFT: ICD-10-CM

## 2024-02-28 DIAGNOSIS — M20.5X1 HALLUX LIMITUS, ACQUIRED, RIGHT: ICD-10-CM

## 2024-02-28 DIAGNOSIS — Z99.2 ARTERIOVENOUS FISTULA FOR HEMODIALYSIS IN PLACE, PRIMARY: Primary | ICD-10-CM

## 2024-02-28 DIAGNOSIS — Z86.31 HISTORY OF DIABETIC ULCER OF FOOT: ICD-10-CM

## 2024-02-28 PROCEDURE — 99213 OFFICE O/P EST LOW 20 MIN: CPT | Mod: S$GLB,,, | Performed by: PODIATRIST

## 2024-02-28 PROCEDURE — 3008F BODY MASS INDEX DOCD: CPT | Mod: CPTII,S$GLB,, | Performed by: SURGERY

## 2024-02-28 PROCEDURE — 99999 PR PBB SHADOW E&M-EST. PATIENT-LVL IV: CPT | Mod: PBBFAC,,, | Performed by: PODIATRIST

## 2024-02-28 PROCEDURE — 3080F DIAST BP >= 90 MM HG: CPT | Mod: CPTII,S$GLB,, | Performed by: PODIATRIST

## 2024-02-28 PROCEDURE — 99214 OFFICE O/P EST MOD 30 MIN: CPT | Mod: S$GLB,,, | Performed by: SURGERY

## 2024-02-28 PROCEDURE — 3080F DIAST BP >= 90 MM HG: CPT | Mod: CPTII,S$GLB,, | Performed by: SURGERY

## 2024-02-28 PROCEDURE — 1160F RVW MEDS BY RX/DR IN RCRD: CPT | Mod: CPTII,S$GLB,, | Performed by: PODIATRIST

## 2024-02-28 PROCEDURE — 99999 PR PBB SHADOW E&M-EST. PATIENT-LVL III: CPT | Mod: PBBFAC,,, | Performed by: SURGERY

## 2024-02-28 PROCEDURE — 1159F MED LIST DOCD IN RCRD: CPT | Mod: CPTII,S$GLB,, | Performed by: SURGERY

## 2024-02-28 PROCEDURE — 1159F MED LIST DOCD IN RCRD: CPT | Mod: CPTII,S$GLB,, | Performed by: PODIATRIST

## 2024-02-28 PROCEDURE — 3077F SYST BP >= 140 MM HG: CPT | Mod: CPTII,S$GLB,, | Performed by: SURGERY

## 2024-02-28 PROCEDURE — 3077F SYST BP >= 140 MM HG: CPT | Mod: CPTII,S$GLB,, | Performed by: PODIATRIST

## 2024-02-28 PROCEDURE — 3008F BODY MASS INDEX DOCD: CPT | Mod: CPTII,S$GLB,, | Performed by: PODIATRIST

## 2024-02-28 NOTE — PROGRESS NOTES
Notified that patient's insurance company has not approved authorization for CT guided iliac bone biopsy scheduled for 2/29/24.     This patient has wide-spread occult malignancy of unknown primary that has been worked-up extensively with imaging/labs however, primary source of malignancy and ability to optimize treatment plan has not yet been possible. Patient needs this procedure urgently to help elucidate the etiology/primary malignancy so he can begin treatment ASAP.     The CT guided iliac bone biopsy is medically urgent.     I attempted to call [employer] authorization department to speak with a representative to facilitate getting insurance approval for the procedure tomorrow. After being on hold for 30 minutes, I had been unable to speak with anyone and there was no option to leave a message.     Medical staff will continue to reach out to patient's insurance company to obtain approval for this medically urgent procedure.     Kathleen Willett NP  Interventional Radiology

## 2024-02-28 NOTE — TELEPHONE ENCOUNTER
Duplicate message. See RN note.      ----- Message from Shahana Figueroa sent at 2/28/2024 12:52 PM CST -----  Type:  Needs Medical Advice    Who Called: pt   Best Call Back Number: [phone] (home)     Additional Information:  Requesting call back  wants to dx sx that is tomorrow     please advise thank you

## 2024-02-28 NOTE — H&P (VIEW-ONLY)
Daniel Poon MD [facility]                         02/28/2024    HPI:  Catalino Mcfadden is a 58 y.o. male with   Patient Active Problem List   Diagnosis    Type 2 diabetes mellitus with stage 5 chronic kidney disease    Essential hypertension    Hyperlipidemia, acquired    ED (erectile dysfunction)    History of diabetic ulcer of foot    Osteomyelitis of second toe of left foot    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    Long term (current) use of antibiotics    Acute osteomyelitis of metatarsal bone of left foot    Diabetic ulcer of toe of left foot    Hyponatremia    Osteomyelitis of left foot    Hypoalbuminemia    Healed ulcer of left foot on examination    Iron deficiency anemia    Metabolic acidosis    Anemia due to chronic kidney disease, on chronic dialysis    Diabetic ulcer of left midfoot associated with type 2 diabetes mellitus, with fat layer exposed    Type II diabetes mellitus with neurological manifestations    Foot ulcer, right, with fat layer exposed    Foot ulceration, left, with fat layer exposed    ESRD on hemodialysis    Malignant renovascular hypertension    Calcified granuloma of lung    Abnormal findings on diagnostic imaging of lung    Tracheobronchomalacia    Intractable hiccups    Serum total bilirubin elevated    Hypertensive emergency    Lower extremity weakness    Abnormal MRI, lumbar spine    Goals of care, counseling/discussion    DJD (degenerative joint disease)    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  Patient is R handed.  Has been on HD for months.  No previous access surgery.  No defibrillator or pacemaker.  No recent UTI.    No extremity pain and swelling.     no MI  no  Stroke  yes DM  yes HTN  no Lupus  no nephropathy  Tobacco use: denies    6/2023: Presents for f/u.  No new issues.  L foot wound has healed.    8/2023:  s/p LUE BC AVF 7/6/23.  Recovered well.  No HD US today.    10/2023:  began using AVF one needle x 1 successfully    10/2023:  DIFFICULTY WITH ACCESS.    2/2024:  unable to make surgery 11/2023.  Using cath for dialysis, occasional AVF.    Past Medical History:   Diagnosis Date    Cancer     Diabetes mellitus, type 2     Diabetic foot ulcer associated with diabetes mellitus due to underlying condition 07/16/2020    ESRD on hemodialysis     Hyperlipidemia     Hypertension     Obese      Past Surgical History:   Procedure Laterality Date    AV FISTULA PLACEMENT Left 07/06/2023    Procedure: CREATION, AV FISTULA;  Surgeon: Daniel Poon MD;  Location: [facility];  Service: Vascular;  Laterality: Left;  RN PREOP 6/28/2023  LABS DAY OF SURGERY    BONE BIOPSY Left 07/17/2020    Procedure: BIOPSY, BONE;  Surgeon: Verona Whitmore DPM;  Location: [facility] OR;  Service: Podiatry;  Laterality: Left;    CERVICAL DISC SURGERY  07/11/2016    DEBRIDEMENT Left 07/17/2020    Procedure: DEBRIDEMENT;  Surgeon: Verona Whitmore DPM;  Location: [facility] OR;  Service: Podiatry;  Laterality: Left;    DEBRIDEMENT OF FOOT Right     toes & plantar surface    ESOPHAGEAL MANOMETRY WITH MEASUREMENT OF IMPEDANCE N/A 03/27/2023    Procedure: MANOMETRY, ESOPHAGUS, WITH IMPEDANCE MEASUREMENT;  Surgeon: Roopa Pérez MD;  [facility]  Service: Endoscopy;  Laterality: N/A;  Belching and rumination protocol please  instructions via portal - sm    ESOPHAGOGASTRODUODENOSCOPY N/A 12/16/2022    Procedure: EGD (ESOPHAGOGASTRODUODENOSCOPY);  Surgeon: Eren Francois MD;  Location: [facility];  Service: Endoscopy;  Laterality: N/A;  Pt. on Dialysis. K+ ordered prior to procedure.EC    ESOPHAGOGASTRODUODENOSCOPY N/A 12/5/2023    Procedure: EGD (ESOPHAGOGASTRODUODENOSCOPY);  Surgeon:  Kash Johnston MD;  Location: [facility] ENDO;  Service: Endoscopy;  Laterality: N/A;    FRACTURE SURGERY Right     medial ankle, metal plate present    INJECTION OF ANESTHETIC AGENT AROUND NERVE Right 2/2/2024    Procedure: BLOCK, NERVE STELLATE GANGLION *HOLD ASPIRIN 5 DAYS PRIOR*;  Surgeon: Gokul Gonzalez MD;  Location: [facility] PAIN MGT;  Service: Pain Management;  Laterality: Right;  [phone]    INSERTION OF TUNNELED CENTRAL VENOUS CATHETER (CVC) WITH SUBCUTANEOUS PORT N/A 06/11/2021    Procedure: TUNNEL CATH INSERTION WITHOUT PORT;  Surgeon: Jose Manuel Diagnostic Provider;  Location: [facility] OR;  Service: Radiology;  Laterality: N/A;  11AM START---PHONE PREOP 6/10/21---COVID POSTIVE ON 7/2020---NO S/S    left leg orthopedic surgery Left     UPPER GASTROINTESTINAL ENDOSCOPY       Family History   Adopted: Yes   Problem Relation Age of Onset    Heart disease Father     Colon cancer Neg Hx     Esophageal cancer Neg Hx     Colon polyps Neg Hx     Stomach cancer Neg Hx      Social History     Socioeconomic History    Marital status: Other    Number of children: 1   Tobacco Use    Smoking status: Never    Smokeless tobacco: Never   Substance and Sexual Activity    Alcohol use: Not Currently     Comment: occ rare beer    Drug use: No    Sexual activity: Not Currently   Social History Narrative    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     Social Determinants of Health     Financial Resource Strain: Patient Declined (2/21/2024)    Overall Financial Resource Strain (CARDIA)     Difficulty of Paying Living Expenses: Patient declined   Food Insecurity: Unknown (2/21/2024)    Hunger Vital Sign     Worried About Running Out of Food in the Last Year: Patient declined   Transportation Needs: Unknown (2/21/2024)    PRAPARE - Transportation     Lack of Transportation (Medical): Patient declined   Physical Activity: Unknown (2/21/2024)    Exercise Vital Sign     Days of Exercise per Week: 2 days   Stress: Stress Concern Present (9/26/2022)     [place] [employer] - Occupational Stress Questionnaire     Feeling of Stress : To some extent   Social Connections: Unknown (2/21/2024)    Social Connection and Isolation Panel [NHANES]     Frequency of Communication with Friends and Family: Patient declined     Active Member of Clubs or Organizations: Patient declined     Attends Club or Organization Meetings: 1 to 4 times per year   Housing Stability: Low Risk  (9/26/2022)    Housing Stability Vital Sign     Unable to Pay for Housing in the Last Year: No     Number of Places Lived in the Last Year: 1     Unstable Housing in the Last Year: No       Current Outpatient Medications:     hydrALAZINE (APRESOLINE) 100 MG tablet, Take 1 tablet (100 mg total) by mouth 3 (three) times daily., Disp: 90 tablet, Rfl: 0    NIFEdipine (PROCARDIA XL) 90 MG (OSM) 24 hr tablet, Take 1 tablet (90 mg total) by mouth once daily., Disp: 90 tablet, Rfl: 3    amitriptyline (ELAVIL) 10 MG tablet, Take 1 tablet (10 mg total) by mouth every evening. (Patient not taking: Reported on 2/28/2024), Disp: 30 tablet, Rfl: 0    aspirin 81 MG Chew, Take 1 tablet (81 mg total) by mouth once daily., Disp: 30 tablet, Rfl: 0    methoxy peg-epoetin beta (MIRCERA INJ), Inject 50 mcg into the skin every 28 days., Disp: , Rfl:     ondansetron (ZOFRAN-ODT) 4 MG TbDL, Take 2 tablets (8 mg total) by mouth every 8 (eight) hours as needed (nausea vomiting). (Patient not taking: Reported on 2/28/2024), Disp: 30 tablet, Rfl: 0    promethazine (PHENERGAN) 12.5 MG Tab, Take 1 tablet (12.5 mg total) by mouth every 6 (six) hours as needed (nausea and vomiting). (Patient not taking: Reported on 2/28/2024), Disp: 30 tablet, Rfl: 3    sevelamer carbonate (RENVELA) 800 mg Tab, Take 1 tablet (800 mg total) by mouth 3 (three) times daily with meals. (Patient not taking: Reported on 2/28/2024), Disp: 270 tablet, Rfl: 3    REVIEW OF SYSTEMS:  General: No fevers or chills; ENT: No sore throat; Allergy and  "Immunology: no persistent infections; Hematological and Lymphatic: No history of bleeding or easy bruising; Endocrine: negative; Respiratory: no cough, shortness of breath, or wheezing; Cardiovascular: no chest pain or dyspnea on exertion; Gastrointestinal: no abdominal pain/back, change in bowel habits, or bloody stools; Genito-Urinary: no dysuria, trouble voiding, or hematuria; Musculoskeletal: negative; Neurological: no TIA or stroke symptoms; Psychiatric: no nervousness, anxiety or depression.    PHYSICAL EXAM:      Pulse: 72         General appearance:  Alert, well-appearing, and in no distress.  Oriented to person, place, and time                    Neurological: Normal speech, no focal findings noted; CN II - XII grossly intact. BUE with sensation to light touch.            Musculoskeletal: Digits/nail without cyanosis/clubbing.  Strength 5/5 BUE.                    Neck: Supple, no significant adenopathy, no carotid bruit can be auscultated                  Chest:  Clear to auscultation, no wheezes, rales or rhonchi, symmetric air entry. No use of accessory muscles               Cardiac: Normal rate and regular rhythm, S1 and S2 normal            Abdomen: Soft, nontender, nondistended, no masses or organomegaly, no hernia     No rebound tenderness noted; bowel sounds normal     Pulsatile aortic mass is non palpable.     No groin adenopathy      Extremities:   2 + R radial pulse, 2 + L radial pulse     2 + R brachial pulse, 2 + L brachial pulse     no RUE edema, no LUE edema     neg Dutch's test RUE, neg Dutch's test LUE, +thrill LUE AVF, inc well healed    Skin: No tissue loss    LAB RESULTS:  No results found for: "CBC"  Lab Results   Component Value Date    LABPROT 12.1 02/26/2024    INR 1.1 02/26/2024     Lab Results   Component Value Date     (L) 02/26/2024    K 4.0 02/26/2024    CL 94 (L) 02/26/2024    CO2 28 02/26/2024    GLU 71 02/26/2024    BUN 23 (H) 02/26/2024    CREATININE 5.8 (H) 02/26/2024 "    CALCIUM 9.5 02/26/2024    ANIONGAP 9 02/26/2024    EGFRNONAA 9.0 (A) 02/23/2022     Lab Results   Component Value Date    WBC 4.10 02/26/2024    RBC 3.67 (L) 02/26/2024    HGB 10.3 (L) 02/26/2024    HCT 31.8 (L) 02/26/2024    MCV 87 02/26/2024    MCH 28.1 02/26/2024    MCHC 32.4 02/26/2024    RDW 15.2 (H) 02/26/2024    PLT 85 (L) 02/26/2024    MPV 9.4 02/26/2024    GRAN 2.7 02/26/2024    GRAN 65.1 02/26/2024    LYMPH 0.6 (L) 02/26/2024    LYMPH 15.6 (L) 02/26/2024    MONO 0.6 02/26/2024    MONO 14.9 02/26/2024    EOS 0.1 02/26/2024    BASO 0.03 02/26/2024    EOSINOPHIL 3.2 02/26/2024    BASOPHIL 0.7 02/26/2024    DIFFMETHOD Automated 02/26/2024     .  Lab Results   Component Value Date    HGBA1C 5.1 02/22/2023       IMAGING:  All pertinent imaging has been reviewed and interpreted independently.    Adequate vein BUE 2022    Left upper extremity dialysis ultrasound shows no hemodynamically significant stenosis.  Flow is adequate.        IMP/PLAN:  58 y.o. male with   Patient Active Problem List   Diagnosis    Type 2 diabetes mellitus with stage 5 chronic kidney disease    Essential hypertension    Hyperlipidemia, acquired    ED (erectile dysfunction)    History of diabetic ulcer of foot    Osteomyelitis of second toe of left foot    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    Long term (current) use of antibiotics    Acute osteomyelitis of metatarsal bone of left foot    Diabetic ulcer of toe of left foot    Hyponatremia    Osteomyelitis of left foot    Hypoalbuminemia    Healed ulcer of left foot on examination    Iron deficiency anemia    Metabolic acidosis    Anemia due to chronic kidney disease, on chronic dialysis    Diabetic ulcer of left midfoot associated with type 2 diabetes mellitus, with fat layer exposed    Type II diabetes mellitus with neurological manifestations    Foot ulcer, right, with fat layer exposed    Foot  ulceration, left, with fat layer exposed    ESRD on hemodialysis    Malignant renovascular hypertension    Calcified granuloma of lung    Abnormal findings on diagnostic imaging of lung    Tracheobronchomalacia    Intractable hiccups    Serum total bilirubin elevated    Hypertensive emergency    Lower extremity weakness    Abnormal MRI, lumbar spine    Goals of care, counseling/discussion    DJD (degenerative joint disease)    Complete lesion of L1 level of lumbar spinal cord    Debility    Metastasis to bone    Metastasis to lung    Metastasis to liver    being managed by PCP and specialists who is here today for evaluation of long term HD access.    -rec LUE AVF superficialization 3/15/24  -preop labs and preop appt    I spent 12 minutes evaluating this patient and greater than 50% of the time was spent counseling, coordinator care and discussing the plan of care.  All questions were answered and patient stated understanding with agreement with the above treatment plan.    Daniel Poon MD [facility]  Vascular and Endovascular Surgery

## 2024-02-28 NOTE — TELEPHONE ENCOUNTER
Duplicate message. Forwarded message     ----- Message from Tisha Colmenares sent at 2/28/2024  1:23 PM CST -----  Type: Need Medical Advice   Who Called: patient  Best callback number:  [phone]  Additional Information: patient ask that the Dr call his insurance to let them know its medically necessary for him to have th procedure tomorrow. His insurance was denied   Please call to further assist, Thanks.

## 2024-02-28 NOTE — PATIENT INSTRUCTIONS
Over the counter pain creams: Voltaren Gel, Biofreeze, Bengay, tiger balm, two old goat, lidocaine gel,  Absorbine Veterinary Liniment Gel Topical Analgesic Sore Muscle and Joint Pain Relief    Recommend lotions: eucerin, eucerin for diabetics, aquaphor, A&D ointment, gold bond for diabetics, sween, Garrison's Bees all purpose baby ointment,  urea 40 with aloe or SkinIntegra rapid crack repair (found on amazon.com)    Shoe recommendations: (try 6pm.com, zappos.com , nordstromrack.RIISnet, or shoes.com for discounted prices) you can visit varsity shoes in [place], DSW shoes in [place]  or bob rack in the [facility] (there are also several shoe brand outlets in the [facility])    ONLY purchase stability style tennis shoes NO flex, foam, free, yoga mat style shoes    Shoe examples:    Asics (GT 4000 or gel foundations), new balance stability type shoes (such as the 940 series), saucony (stabil c3),  Valencia (GTS or Beast or   transcend), propet, HokaOne (tennis shoe) Jaden (tennis shoes and boots)    Curryft Ric (women) Reginald&Desmond (men), clarks, crocs, aerosoles, naturalizers, SAS, ecco, born, pauline buck, rockports (dress shoes)    Vionic, burkenstocks, fitflops, propet, taos, baretraps (sandals)    HokaOne sandals, crocs, propet (house shoes)      Nail Home remedy:  Vicks Vapor rub or Emuaid to nails for easier manageability    Diabetes: Inspecting Your Feet  Diabetes increases your chances of developing foot problems. So inspect your feet every day. This helps you find small skin irritations before they become serious infections. If you have trouble seeing the bottoms of your feet, use a mirror or ask a family member or friend to help.     Pressure spots on the bottom of the foot are common areas where problems develop.   How to check your feet  Below are tips to help you look for foot problems. Try to check your feet at the same time each day, such as when you get out of bed in the morning:  Check the top of  each foot. The tops of toes, back of the heel, and outer edge of the foot can get a lot of rubbing from poor-fitting shoes.  Check the bottom of each foot. Daily wear and tear often leads to problems at pressure spots.  Check the toes and nails. Fungal infections often occur between toes. Toenail problems can also be a sign of fungal infections or lead to breaks in the skin.  Check your shoes, too. Loose objects inside a shoe can injure the foot. Use your hand to feel inside your shoes for things like james, loose stitching, or rough areas that could irritate your skin.  Warning signs  Look for any color changes in the foot. Redness with streaks can signal a severe infection, which needs immediate medical attention. Tell your doctor right away if you have any of these problems:  Swelling, sometimes with color changes, may be a sign of poor blood flow or infection. Symptoms include tenderness and an increase in the size of your foot.  Warm or hot areas on your feet may be signs of infection. A foot that is cold may not be getting enough blood.  Sensations such as burning, tingling, or pins and needles can be signs of a problem. Also check for areas that may be numb.  Hot spots are caused by friction or pressure. Look for hot spots in areas that get a lot of rubbing. Hot spots can turn into blisters, calluses, or sores.  Cracks and sores are caused by dry or irritated skin. They are a sign that the skin is breaking down, which can lead to infection.  Toenail problems to watch for include nails growing into the skin (ingrown toenail) and causing redness or pain. Thick, yellow, or discolored nails can signal a fungal infection.  Drainage and odor can develop from untreated sores and ulcers. Call your doctor right away if you notice white or yellow drainage, bleeding, or unpleasant odor.   © 1651-4228 [employer]. [address] [place] [address], [place] [address]. All rights reserved. This information is not  intended as a substitute for professional medical care. Always follow your healthcare professional's instructions.        Step-by-Step:  Inspecting Your Feet (Diabetes)    Date Last Reviewed: 10/1/2016  © 9315-0499 [employer]. [address] [place] [address], [place] [address]. All rights reserved. This information is not intended as a substitute for professional medical care. Always follow your healthcare professional's instructions.

## 2024-02-28 NOTE — PROGRESS NOTES
Subjective:      Patient ID: Catalino Mcfadden is a 58 y.o. male.    Chief Complaint: Diabetes Mellitus (1/10/2024 Dr. Rodríguez ) and Nail Care    Catalino is a 58 y.o. male who presents to the clinic for evaluation and treatment of high risk feet. Catalino has a past medical history of Cancer, Diabetes mellitus, type 2, Diabetic foot ulcer associated with diabetes mellitus due to underlying condition (07/16/2020), ESRD on hemodialysis, Hyperlipidemia, Hypertension, and Obese. Reports new onset left heel blister x several days after a sock rubbed on the left heel. Seen in ED on 11/7/22.     11/16/2022 returns to clinic for follow up of left heel wound.  Seen by my colleague last week, cultures obtained (NGTD) and patient placed on PO abx.  He has kept dressing intact.     11/23/22: F/u left heel ulceration. Presents with calamine coflex left foot.     11/30/22: F/u left heel ulceration. Patient reports going to work this week.     12/14/22: F/u left heel ulceration. Presents in calamine coflex wrap.     12/21/22: F/u left heel ulceration. Presents in calamine coflex wrap.     12/27/22: F/u left heel ulceration. Presents in calamine coflex wrap. No new pedal complaints, continues to work     01/04/23: F/u left heel ulceration. Presents in calamine coflex wrap. No new pedal complaints, continues to work.  Continues to have hiccups      01/11/23: F/u left heel ulceration. Presents in calamine coflex wrap which appears to have padding which shifted medially. No new pedal complaints, continues to work.  Continues to have hiccups    01/18/23: F/u left heel ulceration. Presents in intact calamine coflex wrap. No new pedal complaints.    2/8/23: F/u right foot ulceration. Presents in calamine coflex wrap.     02/15/23: F/u right ulceration. Presents in intact calamine coflex wrap. No new pedal complaints.    3/15/23: F/u right foot ulceration. Presents in calamine coflex wrap.     03/22/23: F/u right ulceration. Presents in intact  calamine coflex wrap. No new pedal complaints.    04/12/23: Returns to clinic for foot inspection following achieving status of healed to bilateral foot wounds.  He has been attempting to care for feet as instructed. 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 Present in NB tennis shoes.  States he became a vegan on 04/04/2023. No new pedal complaints.    06/21/2023 patient returns to clinic for evaluation and treatment of high-risk feet.  He is status post achieving the status of healed to wounds of both feet.  He has been attempting to care the feet as instructed.  He continues to work and attempts to protect the feet in supportive shoes to the best of his ability.  Patient continues to suffer from hiccups.  No new pedal complaints.    07/19/2023 patient returns to clinic for evaluation and treatment of high-risk feet.   He has been attempting to care the feet as instructed. No longer using offloading pads He continues to work and attempts to protect the feet in supportive shoes to the best of his ability.  Patient continues to suffer from hiccups.  No new pedal complaints.    08/23/2023 patient returns to clinic for evaluation treatment of high-risk feet.  He continues to care for feet as instructed.  Recently acquired a new pair of tennis shoes, Denzel 1.  Continues to work for relates he may be looking for new employment in the coming weeks.  Patient relates a new symptom which is loss of balance or feeling like he is losing his equilibrium with walking and with rising.  He denies falls.  He denies nausea, vomiting, fever, chills.  He continues to have hiccups.    11/15/2023 patient returns to clinic for evaluation treatment of high-risk feet.  He continues to care for feet as instructed.   Patient relates that he has become virtually sedentary.  He no longer is employed.  He is living with a friend in [place].  He continues to feel a loss of balance and now also feels  weakness to the lower extremities.  He denies falls.  He denies nausea, vomiting, fever, chills.  He continues to have hiccups.    02/28/2024 patient returns to clinic for evaluation treatment of high-risk feet.  He continues to care for feet as instructed.  Since our last encounter, patient relates that he has now become houseless with most of his belongings being in storage and currently residing in a hotel temporarily.  Patient has plans to return to his hometown of [place] when able.  States that because many of his belongings are in storage he has been wearing the flat nonsupportive tennis shoe that he presents in today.  He states that when he had his feet checked and dialysis they noted increased callus formation to his left lateral foot which was an area previous chronic ulceration and osteomyelitis.  He denies seeing any drainage.  He denies pain secondary to neuropathy.  He has yet to find employment due to consistently failing physical exams.  He continues to feel a loss of balance and now also feels weakness to the lower extremities.  He denies falls.  He continues to have hiccups.      Chief Complaint   Patient presents with    Diabetes Mellitus     1/10/2024 Dr. Rodríguez     [facility]         Hemoglobin A1C   Date Value Ref Range Status   02/22/2023 5.1 4.0 - 5.6 % Final     Comment:     ADA Screening Guidelines:  5.7-6.4%  Consistent with prediabetes  >or=6.5%  Consistent with diabetes    High levels of fetal hemoglobin interfere with the HbA1C  assay. Heterozygous hemoglobin variants (HbS, HgC, etc)do  not significantly interfere with this assay.   However, presence of multiple variants may affect accuracy.     02/23/2022 5.0 4.0 - 5.6 % Final     Comment:     ADA Screening Guidelines:  5.7-6.4%  Consistent with prediabetes  >or=6.5%  Consistent with diabetes    High levels of fetal hemoglobin interfere with the HbA1C  assay. Heterozygous hemoglobin variants (HbS, HgC, etc)do  not significantly  "interfere with this assay.   However, presence of multiple variants may affect accuracy.     02/09/2022 5.0 4.0 - 5.6 % Final     Comment:     ADA Screening Guidelines:  5.7-6.4%  Consistent with prediabetes  >or=6.5%  Consistent with diabetes    High levels of fetal hemoglobin interfere with the HbA1C  assay. Heterozygous hemoglobin variants (HbS, HgC, etc)do  not significantly interfere with this assay.   However, presence of multiple variants may affect accuracy.         Review of Systems   Constitutional: Positive for weight loss. Negative for chills.   Cardiovascular:  Negative for chest pain and claudication.   Respiratory:  Negative for cough.    Skin:  Positive for color change, dry skin and nail changes.   Musculoskeletal:  Positive for joint pain and muscle weakness.   Gastrointestinal:  Negative for nausea.   Neurological:  Positive for disturbances in coordination, loss of balance, numbness and paresthesias.   Psychiatric/Behavioral:  The patient is not nervous/anxious.            Objective:      Vitals:    02/28/24 0704   BP: (!) 198/91   Pulse: 85   Weight: 83.9 kg (184 lb 15.5 oz)   Height: 6' 1" (1.854 m)   PainSc: 0-No pain           Physical Exam  Vitals and nursing note reviewed.   Constitutional:       General: He is not in acute distress.     Appearance: He is not toxic-appearing or diaphoretic.      Comments: Pt. is well-developed, well-nourished, appears stated age, in no acute distress, alert and oriented x 3. No evidence of depression, anxiety, or agitation. Calm, cooperative, and communicative. Appropriate interactions and affect.   Cardiovascular:      Pulses:           Dorsalis pedis pulses are 2+ on the right side and 2+ on the left side.        Posterior tibial pulses are 1+ on the right side and 1+ on the left side.      Comments: There is decreased digital hair. Skin is atrophic, slightly hyperpigmented  Pulmonary:      Effort: No respiratory distress.   Musculoskeletal:      Right " lower leg: Edema present.      Left lower leg: Edema present.      Right ankle: Swelling present. No tenderness. No lateral malleolus, medial malleolus, AITF ligament, CF ligament or posterior TF ligament tenderness.      Right Achilles Tendon: No defects. Hilliard's test negative.      Left ankle: Swelling present. No tenderness. No lateral malleolus, medial malleolus, AITF ligament, CF ligament or posterior TF ligament tenderness.      Left Achilles Tendon: No defects. Hilliard's test negative.      Right foot: No tenderness or bony tenderness.      Left foot: No tenderness or bony tenderness.      Comments: Decreased stride, station of gait.  apropulsive toe off.  Increased angle and base of gait.    There is equinus deformity bilateral with decreased dorsiflexion at the ankle joint bilateral. No tenderness with compression of heel. Negative tinels sign. Gait analysis reveals excessive pronation through midstance and propulsion with early heel off.     Patient has hammertoes of digits 2-5 bilateral partially reducible     Decreased first MPJ range of motion both weightbearing and nonweightbearing, no crepitus observed the first MP joint, + dorsal flag sign.     Visible and palpable bunion without pain at dorsomedial 1st metatarsal head right and left.  Hallux abducted right and left partially reducible, tracks laterally without being track bound.  No ecchymosis, erythema, edema, or cardinal signs infection or signs of trauma same foot.    Fat pad atrophy to heels and met heads bilateral    Plantarflexed 1st ray RLE   Lymphadenopathy:      Comments: No lymphatic streaking    Negative lymphadenopathy bilateral popliteal fossa and tarsal tunnel.     Skin:     General: Skin is warm and dry.      Coloration: Skin is not pale.      Findings: Lesion (see wound descriptions below) present. No ecchymosis, laceration or rash.      Nails: There is no clubbing.      Comments: Toenails 1-5 bilaterally discolored/yellowed,  dystrophic, brittle with subungual debris.   Superficial abrasion left dorsal lateral foot.    Focal hyperkeratotic lesion consisting entirely of hyperkeratotic tissue without open skin, drainage, pus, fluctuance, malodor, or signs of infection:  left plantar foot.       Neurological:      Sensory: Sensory deficit present.      Comments:  [place] 5.07 monofilamant testing is diminished Kp feet. Decreased/absent vibratory sensation bilateral feet to 128Hz tuning fork.     Paresthesias, and hyperesthesia bilateral feet with no clearly identified trigger or source.           Psychiatric:         Attention and Perception: He is attentive.         Mood and Affect: Mood is depressed. Mood is not anxious. Affect is not inappropriate.         Speech: He is communicative. Speech is not slurred.         Behavior: Behavior is not combative.       02/28/2024    Pre ulcerative callus to left lateral midfoot      Previous site of left medial heel wound        11/15/2023                      08/23/2023     Left foot                Right foot              07/19/2023 06/21/2023 04/12/23:                    Assessment:       Encounter Diagnoses   Name Primary?    Type 2 diabetes mellitus with stage 5 chronic kidney disease Yes    Pre-ulcerative calluses     History of diabetic ulcer of foot     Hallux limitus, acquired, right     Hallux limitus, acquired, left                    Plan:       Catalino was seen today for diabetes mellitus and nail care.    Diagnoses and all orders for this visit:    Type 2 diabetes mellitus with stage 5 chronic kidney disease    Pre-ulcerative calluses    History of diabetic ulcer of foot    Hallux limitus, acquired, right    Hallux limitus, acquired, left            I counseled the patient on his conditions, their implications and medical management.    Education about the prevention of limb loss.    Discussed wound healing cycle, skin integrity, ways to care  for skin.Counseled patient on the effects of biomechanical pressure on healing. He verbalizes understanding that it can increase the chances of delayed healing and this prolonged exposure leads to infection or progression of infection which subsequently can result in loss of limb.    Adequate vitamin supplementation, protein intake, and hydration - discussed with patient    All wounds have remained healed, however I am concerned about the significant callus formation to previous chronic wound site of the left foot.  Encouraged patient at his convenience to try to get into his storage to get his more supportive tennis shoes. Skin is still delicate therefore patient must be diligent in avoiding excessive pressure and making sure there is adequate support and padding in shoe gear.     I am concerned about his living situation given his multiple comorbidities.  Patient is a  and hopefully we will be able to get some assistance long-term as well as disability since he has been unable to get employment secondary to his multiple comorbidities    Follow-up: 8-12 weeks but should call [facility] immediately if any signs of infection, such as fever, chills, sweats, increased redness or pain.    Short-term goals include maintaining good offloading and minimizing bioburden, promoting granulation and epithelialization to healing.  Long-term goals include keeping the wound healed by good offloading and medical management under the direction of internist.    Shoe inspection. Diabetic Foot Education. Patient reminded of the importance of good nutrition and blood sugar control to help prevent podiatric complications of diabetes. Patient instructed on proper foot hygeine. We discussed wearing proper shoe gear, daily foot inspections, never walking without protective shoe gear, never putting sharp instruments to feet.

## 2024-02-28 NOTE — PROGRESS NOTES
Daniel Poon MD [facility]                         02/28/2024    HPI:  Catalino Mcfadden is a 58 y.o. male with   Patient Active Problem List   Diagnosis    Type 2 diabetes mellitus with stage 5 chronic kidney disease    Essential hypertension    Hyperlipidemia, acquired    ED (erectile dysfunction)    History of diabetic ulcer of foot    Osteomyelitis of second toe of left foot    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    Long term (current) use of antibiotics    Acute osteomyelitis of metatarsal bone of left foot    Diabetic ulcer of toe of left foot    Hyponatremia    Osteomyelitis of left foot    Hypoalbuminemia    Healed ulcer of left foot on examination    Iron deficiency anemia    Metabolic acidosis    Anemia due to chronic kidney disease, on chronic dialysis    Diabetic ulcer of left midfoot associated with type 2 diabetes mellitus, with fat layer exposed    Type II diabetes mellitus with neurological manifestations    Foot ulcer, right, with fat layer exposed    Foot ulceration, left, with fat layer exposed    ESRD on hemodialysis    Malignant renovascular hypertension    Calcified granuloma of lung    Abnormal findings on diagnostic imaging of lung    Tracheobronchomalacia    Intractable hiccups    Serum total bilirubin elevated    Hypertensive emergency    Lower extremity weakness    Abnormal MRI, lumbar spine    Goals of care, counseling/discussion    DJD (degenerative joint disease)    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  Patient is R handed.  Has been on HD for months.  No previous access surgery.  No defibrillator or pacemaker.  No recent UTI.    No extremity pain and swelling.     no MI  no  Stroke  yes DM  yes HTN  no Lupus  no nephropathy  Tobacco use: denies    6/2023: Presents for f/u.  No new issues.  L foot wound has healed.    8/2023:  s/p LUE BC AVF 7/6/23.  Recovered well.  No HD US today.    10/2023:  began using AVF one needle x 1 successfully    10/2023:  DIFFICULTY WITH ACCESS.    2/2024:  unable to make surgery 11/2023.  Using cath for dialysis, occasional AVF.    Past Medical History:   Diagnosis Date    Cancer     Diabetes mellitus, type 2     Diabetic foot ulcer associated with diabetes mellitus due to underlying condition 07/16/2020    ESRD on hemodialysis     Hyperlipidemia     Hypertension     Obese      Past Surgical History:   Procedure Laterality Date    AV FISTULA PLACEMENT Left 07/06/2023    Procedure: CREATION, AV FISTULA;  Surgeon: Daniel Poon MD;  Location: [facility];  Service: Vascular;  Laterality: Left;  RN PREOP 6/28/2023  LABS DAY OF SURGERY    BONE BIOPSY Left 07/17/2020    Procedure: BIOPSY, BONE;  Surgeon: Verona Whitmore DPM;  Location: [facility] OR;  Service: Podiatry;  Laterality: Left;    CERVICAL DISC SURGERY  07/11/2016    DEBRIDEMENT Left 07/17/2020    Procedure: DEBRIDEMENT;  Surgeon: Verona Whitmore DPM;  Location: [facility] OR;  Service: Podiatry;  Laterality: Left;    DEBRIDEMENT OF FOOT Right     toes & plantar surface    ESOPHAGEAL MANOMETRY WITH MEASUREMENT OF IMPEDANCE N/A 03/27/2023    Procedure: MANOMETRY, ESOPHAGUS, WITH IMPEDANCE MEASUREMENT;  Surgeon: Roopa Pérez MD;  [facility]  Service: Endoscopy;  Laterality: N/A;  Belching and rumination protocol please  instructions via portal - sm    ESOPHAGOGASTRODUODENOSCOPY N/A 12/16/2022    Procedure: EGD (ESOPHAGOGASTRODUODENOSCOPY);  Surgeon: Eren Francois MD;  Location: [facility];  Service: Endoscopy;  Laterality: N/A;  Pt. on Dialysis. K+ ordered prior to procedure.EC    ESOPHAGOGASTRODUODENOSCOPY N/A 12/5/2023    Procedure: EGD (ESOPHAGOGASTRODUODENOSCOPY);  Surgeon:  Kash Johnston MD;  Location: [facility] ENDO;  Service: Endoscopy;  Laterality: N/A;    FRACTURE SURGERY Right     medial ankle, metal plate present    INJECTION OF ANESTHETIC AGENT AROUND NERVE Right 2/2/2024    Procedure: BLOCK, NERVE STELLATE GANGLION *HOLD ASPIRIN 5 DAYS PRIOR*;  Surgeon: Gokul Gonzalez MD;  Location: [facility] PAIN MGT;  Service: Pain Management;  Laterality: Right;  [phone]    INSERTION OF TUNNELED CENTRAL VENOUS CATHETER (CVC) WITH SUBCUTANEOUS PORT N/A 06/11/2021    Procedure: TUNNEL CATH INSERTION WITHOUT PORT;  Surgeon: Jose Manuel Diagnostic Provider;  Location: [facility] OR;  Service: Radiology;  Laterality: N/A;  11AM START---PHONE PREOP 6/10/21---COVID POSTIVE ON 7/2020---NO S/S    left leg orthopedic surgery Left     UPPER GASTROINTESTINAL ENDOSCOPY       Family History   Adopted: Yes   Problem Relation Age of Onset    Heart disease Father     Colon cancer Neg Hx     Esophageal cancer Neg Hx     Colon polyps Neg Hx     Stomach cancer Neg Hx      Social History     Socioeconomic History    Marital status: Other    Number of children: 1   Tobacco Use    Smoking status: Never    Smokeless tobacco: Never   Substance and Sexual Activity    Alcohol use: Not Currently     Comment: occ rare beer    Drug use: No    Sexual activity: Not Currently   Social History Narrative    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     Social Determinants of Health     Financial Resource Strain: Patient Declined (2/21/2024)    Overall Financial Resource Strain (CARDIA)     Difficulty of Paying Living Expenses: Patient declined   Food Insecurity: Unknown (2/21/2024)    Hunger Vital Sign     Worried About Running Out of Food in the Last Year: Patient declined   Transportation Needs: Unknown (2/21/2024)    PRAPARE - Transportation     Lack of Transportation (Medical): Patient declined   Physical Activity: Unknown (2/21/2024)    Exercise Vital Sign     Days of Exercise per Week: 2 days   Stress: Stress Concern Present (9/26/2022)     [employer] - Occupational Stress Questionnaire     Feeling of Stress : To some extent   Social Connections: Unknown (2/21/2024)    Social Connection and Isolation Panel [NHANES]     Frequency of Communication with Friends and Family: Patient declined     Active Member of Clubs or Organizations: Patient declined     Attends Club or Organization Meetings: 1 to 4 times per year   Housing Stability: Low Risk  (9/26/2022)    Housing Stability Vital Sign     Unable to Pay for Housing in the Last Year: No     Number of Places Lived in the Last Year: 1     Unstable Housing in the Last Year: No       Current Outpatient Medications:     hydrALAZINE (APRESOLINE) 100 MG tablet, Take 1 tablet (100 mg total) by mouth 3 (three) times daily., Disp: 90 tablet, Rfl: 0    NIFEdipine (PROCARDIA XL) 90 MG (OSM) 24 hr tablet, Take 1 tablet (90 mg total) by mouth once daily., Disp: 90 tablet, Rfl: 3    amitriptyline (ELAVIL) 10 MG tablet, Take 1 tablet (10 mg total) by mouth every evening. (Patient not taking: Reported on 2/28/2024), Disp: 30 tablet, Rfl: 0    aspirin 81 MG Chew, Take 1 tablet (81 mg total) by mouth once daily., Disp: 30 tablet, Rfl: 0    methoxy peg-epoetin beta (MIRCERA INJ), Inject 50 mcg into the skin every 28 days., Disp: , Rfl:     ondansetron (ZOFRAN-ODT) 4 MG TbDL, Take 2 tablets (8 mg total) by mouth every 8 (eight) hours as needed (nausea vomiting). (Patient not taking: Reported on 2/28/2024), Disp: 30 tablet, Rfl: 0    promethazine (PHENERGAN) 12.5 MG Tab, Take 1 tablet (12.5 mg total) by mouth every 6 (six) hours as needed (nausea and vomiting). (Patient not taking: Reported on 2/28/2024), Disp: 30 tablet, Rfl: 3    sevelamer carbonate (RENVELA) 800 mg Tab, Take 1 tablet (800 mg total) by mouth 3 (three) times daily with meals. (Patient not taking: Reported on 2/28/2024), Disp: 270 tablet, Rfl: 3    REVIEW OF SYSTEMS:  General: No fevers or chills; ENT: No sore throat; Allergy and  "Immunology: no persistent infections; Hematological and Lymphatic: No history of bleeding or easy bruising; Endocrine: negative; Respiratory: no cough, shortness of breath, or wheezing; Cardiovascular: no chest pain or dyspnea on exertion; Gastrointestinal: no abdominal pain/back, change in bowel habits, or bloody stools; Genito-Urinary: no dysuria, trouble voiding, or hematuria; Musculoskeletal: negative; Neurological: no TIA or stroke symptoms; Psychiatric: no nervousness, anxiety or depression.    PHYSICAL EXAM:      Pulse: 72         General appearance:  Alert, well-appearing, and in no distress.  Oriented to person, place, and time                    Neurological: Normal speech, no focal findings noted; CN II - XII grossly intact. BUE with sensation to light touch.            Musculoskeletal: Digits/nail without cyanosis/clubbing.  Strength 5/5 BUE.                    Neck: Supple, no significant adenopathy, no carotid bruit can be auscultated                  Chest:  Clear to auscultation, no wheezes, rales or rhonchi, symmetric air entry. No use of accessory muscles               Cardiac: Normal rate and regular rhythm, S1 and S2 normal            Abdomen: Soft, nontender, nondistended, no masses or organomegaly, no hernia     No rebound tenderness noted; bowel sounds normal     Pulsatile aortic mass is non palpable.     No groin adenopathy      Extremities:   2 + R radial pulse, 2 + L radial pulse     2 + R brachial pulse, 2 + L brachial pulse     no RUE edema, no LUE edema     neg Dutch's test RUE, neg Dutch's test LUE, +thrill LUE AVF, inc well healed    Skin: No tissue loss    LAB RESULTS:  No results found for: "CBC"  Lab Results   Component Value Date    LABPROT 12.1 02/26/2024    INR 1.1 02/26/2024     Lab Results   Component Value Date     (L) 02/26/2024    K 4.0 02/26/2024    CL 94 (L) 02/26/2024    CO2 28 02/26/2024    GLU 71 02/26/2024    BUN 23 (H) 02/26/2024    CREATININE 5.8 (H) 02/26/2024 "    CALCIUM 9.5 02/26/2024    ANIONGAP 9 02/26/2024    EGFRNONAA 9.0 (A) 02/23/2022     Lab Results   Component Value Date    WBC 4.10 02/26/2024    RBC 3.67 (L) 02/26/2024    HGB 10.3 (L) 02/26/2024    HCT 31.8 (L) 02/26/2024    MCV 87 02/26/2024    MCH 28.1 02/26/2024    MCHC 32.4 02/26/2024    RDW 15.2 (H) 02/26/2024    PLT 85 (L) 02/26/2024    MPV 9.4 02/26/2024    GRAN 2.7 02/26/2024    GRAN 65.1 02/26/2024    LYMPH 0.6 (L) 02/26/2024    LYMPH 15.6 (L) 02/26/2024    MONO 0.6 02/26/2024    MONO 14.9 02/26/2024    EOS 0.1 02/26/2024    BASO 0.03 02/26/2024    EOSINOPHIL 3.2 02/26/2024    BASOPHIL 0.7 02/26/2024    DIFFMETHOD Automated 02/26/2024     .  Lab Results   Component Value Date    HGBA1C 5.1 02/22/2023       IMAGING:  All pertinent imaging has been reviewed and interpreted independently.    Adequate vein BUE 2022    Left upper extremity dialysis ultrasound shows no hemodynamically significant stenosis.  Flow is adequate.        IMP/PLAN:  58 y.o. male with   Patient Active Problem List   Diagnosis    Type 2 diabetes mellitus with stage 5 chronic kidney disease    Essential hypertension    Hyperlipidemia, acquired    ED (erectile dysfunction)    History of diabetic ulcer of foot    Osteomyelitis of second toe of left foot    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    Long term (current) use of antibiotics    Acute osteomyelitis of metatarsal bone of left foot    Diabetic ulcer of toe of left foot    Hyponatremia    Osteomyelitis of left foot    Hypoalbuminemia    Healed ulcer of left foot on examination    Iron deficiency anemia    Metabolic acidosis    Anemia due to chronic kidney disease, on chronic dialysis    Diabetic ulcer of left midfoot associated with type 2 diabetes mellitus, with fat layer exposed    Type II diabetes mellitus with neurological manifestations    Foot ulcer, right, with fat layer exposed    Foot  ulceration, left, with fat layer exposed    ESRD on hemodialysis    Malignant renovascular hypertension    Calcified granuloma of lung    Abnormal findings on diagnostic imaging of lung    Tracheobronchomalacia    Intractable hiccups    Serum total bilirubin elevated    Hypertensive emergency    Lower extremity weakness    Abnormal MRI, lumbar spine    Goals of care, counseling/discussion    DJD (degenerative joint disease)    Complete lesion of L1 level of lumbar spinal cord    Debility    Metastasis to bone    Metastasis to lung    Metastasis to liver    being managed by PCP and specialists who is here today for evaluation of long term HD access.    -rec LUE AVF superficialization 3/15/24  -preop labs and preop appt    I spent 12 minutes evaluating this patient and greater than 50% of the time was spent counseling, coordinator care and discussing the plan of care.  All questions were answered and patient stated understanding with agreement with the above treatment plan.    Daniel Poon MD [facility]  Vascular and Endovascular Surgery

## 2024-02-28 NOTE — TELEPHONE ENCOUNTER
----- Message from Tisha Colmenares sent at 2/28/2024  1:25 PM CST -----  Type: Need Medical Advice   Who Called: patient  Best callback number: [phone]  Additional Information: patient ask that the Dr call his insurance to let them know its medically necessary for him to have th procedure tomorrow. His insurance was denied   Please call to further assist, Thanks.

## 2024-02-29 ENCOUNTER — ANESTHESIA EVENT (OUTPATIENT)
Dept: SURGERY | Facility: HOSPITAL | Age: 59
End: 2024-02-29
Payer: COMMERCIAL

## 2024-02-29 ENCOUNTER — HOSPITAL ENCOUNTER (OUTPATIENT)
Dept: INTERVENTIONAL RADIOLOGY/VASCULAR | Facility: HOSPITAL | Age: 59
Discharge: HOME | End: 2024-02-29
Attending: NURSE PRACTITIONER | Admitting: RADIOLOGY
Payer: COMMERCIAL

## 2024-02-29 VITALS
TEMPERATURE: 98 F | DIASTOLIC BLOOD PRESSURE: 76 MMHG | HEART RATE: 71 BPM | SYSTOLIC BLOOD PRESSURE: 157 MMHG | RESPIRATION RATE: 16 BRPM | OXYGEN SATURATION: 100 %

## 2024-02-29 DIAGNOSIS — R59.1 DIFFUSE LYMPHADENOPATHY: ICD-10-CM

## 2024-02-29 DIAGNOSIS — R59.1 LYMPHADENOPATHY: ICD-10-CM

## 2024-02-29 DIAGNOSIS — K76.9 HEPATIC LESION: ICD-10-CM

## 2024-02-29 DIAGNOSIS — D73.89 SPLENIC LESION: ICD-10-CM

## 2024-02-29 DIAGNOSIS — M89.9 LYTIC BONE LESIONS ON XRAY: Primary | ICD-10-CM

## 2024-02-29 PROBLEM — M89.8X9 LYTIC BONE LESIONS ON XRAY: Status: ACTIVE | Noted: 2024-02-29

## 2024-02-29 PROCEDURE — 25000003 PHARM REV CODE 250: Performed by: RADIOLOGY

## 2024-02-29 PROCEDURE — 88307 TISSUE EXAM BY PATHOLOGIST: CPT | Mod: 26,,, | Performed by: PATHOLOGY

## 2024-02-29 PROCEDURE — 99152 MOD SED SAME PHYS/QHP 5/>YRS: CPT | Performed by: RADIOLOGY

## 2024-02-29 PROCEDURE — 88341 IMHCHEM/IMCYTCHM EA ADD ANTB: CPT | Performed by: PATHOLOGY

## 2024-02-29 PROCEDURE — 88365 INSITU HYBRIDIZATION (FISH): CPT | Mod: 26,,, | Performed by: PATHOLOGY

## 2024-02-29 PROCEDURE — 88342 IMHCHEM/IMCYTCHM 1ST ANTB: CPT | Performed by: PATHOLOGY

## 2024-02-29 PROCEDURE — 77012 CT SCAN FOR NEEDLE BIOPSY: CPT | Mod: TC | Performed by: RADIOLOGY

## 2024-02-29 PROCEDURE — 88307 TISSUE EXAM BY PATHOLOGIST: CPT | Performed by: PATHOLOGY

## 2024-02-29 PROCEDURE — 63600175 PHARM REV CODE 636 W HCPCS: Performed by: RADIOLOGY

## 2024-02-29 PROCEDURE — 99153 MOD SED SAME PHYS/QHP EA: CPT | Performed by: RADIOLOGY

## 2024-02-29 PROCEDURE — 25000003 PHARM REV CODE 250: Performed by: NURSE PRACTITIONER

## 2024-02-29 PROCEDURE — 20220 BONE BIOPSY TROCAR/NDL SUPFC: CPT | Mod: ,,, | Performed by: RADIOLOGY

## 2024-02-29 PROCEDURE — 88185 FLOWCYTOMETRY/TC ADD-ON: CPT | Performed by: PATHOLOGY

## 2024-02-29 PROCEDURE — 88364 INSITU HYBRIDIZATION (FISH): CPT | Performed by: PATHOLOGY

## 2024-02-29 PROCEDURE — 88342 IMHCHEM/IMCYTCHM 1ST ANTB: CPT | Mod: 26,,, | Performed by: PATHOLOGY

## 2024-02-29 PROCEDURE — 88365 INSITU HYBRIDIZATION (FISH): CPT | Performed by: PATHOLOGY

## 2024-02-29 PROCEDURE — 88184 FLOWCYTOMETRY/ TC 1 MARKER: CPT | Performed by: PATHOLOGY

## 2024-02-29 PROCEDURE — 99153 MOD SED SAME PHYS/QHP EA: CPT

## 2024-02-29 PROCEDURE — 88189 FLOWCYTOMETRY/READ 16 & >: CPT | Mod: ,,, | Performed by: PATHOLOGY

## 2024-02-29 PROCEDURE — 99152 MOD SED SAME PHYS/QHP 5/>YRS: CPT

## 2024-02-29 PROCEDURE — 99203 OFFICE O/P NEW LOW 30 MIN: CPT | Mod: ,,, | Performed by: RADIOLOGY

## 2024-02-29 PROCEDURE — 88341 IMHCHEM/IMCYTCHM EA ADD ANTB: CPT | Mod: 26,,, | Performed by: PATHOLOGY

## 2024-02-29 PROCEDURE — 88364 INSITU HYBRIDIZATION (FISH): CPT | Mod: 26,,, | Performed by: PATHOLOGY

## 2024-02-29 PROCEDURE — 99152 MOD SED SAME PHYS/QHP 5/>YRS: CPT | Mod: ,,, | Performed by: RADIOLOGY

## 2024-02-29 PROCEDURE — C1830 POWER BONE MARROW BX NEEDLE: HCPCS

## 2024-02-29 RX ORDER — FENTANYL CITRATE 50 UG/ML
INJECTION, SOLUTION INTRAMUSCULAR; INTRAVENOUS
Status: COMPLETED | OUTPATIENT
Start: 2024-02-29 | End: 2024-02-29

## 2024-02-29 RX ORDER — MIDAZOLAM HYDROCHLORIDE 1 MG/ML
INJECTION INTRAMUSCULAR; INTRAVENOUS
Status: COMPLETED | OUTPATIENT
Start: 2024-02-29 | End: 2024-02-29

## 2024-02-29 RX ORDER — LIDOCAINE HYDROCHLORIDE 10 MG/ML
INJECTION INFILTRATION; PERINEURAL
Status: COMPLETED | OUTPATIENT
Start: 2024-02-29 | End: 2024-02-29

## 2024-02-29 RX ORDER — HYDROCODONE BITARTRATE AND ACETAMINOPHEN 5; 325 MG/1; MG/1
1 TABLET ORAL EVERY 4 HOURS PRN
Status: DISCONTINUED | OUTPATIENT
Start: 2024-02-29 | End: 2024-03-01 | Stop reason: HOSPADM

## 2024-02-29 RX ORDER — SODIUM CHLORIDE 9 MG/ML
INJECTION, SOLUTION INTRAVENOUS CONTINUOUS
Status: DISCONTINUED | OUTPATIENT
Start: 2024-02-29 | End: 2024-03-01 | Stop reason: HOSPADM

## 2024-02-29 RX ORDER — LIDOCAINE HYDROCHLORIDE 10 MG/ML
1 INJECTION, SOLUTION EPIDURAL; INFILTRATION; INTRACAUDAL; PERINEURAL ONCE
Status: DISCONTINUED | OUTPATIENT
Start: 2024-02-29 | End: 2024-03-01 | Stop reason: HOSPADM

## 2024-02-29 RX ORDER — MORPHINE SULFATE 10 MG/ML
2 INJECTION INTRAMUSCULAR; INTRAVENOUS; SUBCUTANEOUS
Status: DISCONTINUED | OUTPATIENT
Start: 2024-02-29 | End: 2024-03-01 | Stop reason: HOSPADM

## 2024-02-29 RX ADMIN — MIDAZOLAM HYDROCHLORIDE 2 MG: 1 INJECTION INTRAMUSCULAR; INTRAVENOUS at 01:02

## 2024-02-29 RX ADMIN — FENTANYL CITRATE 50 MCG: 50 INJECTION, SOLUTION INTRAMUSCULAR; INTRAVENOUS at 01:02

## 2024-02-29 RX ADMIN — LIDOCAINE HYDROCHLORIDE 10 ML: 10 INJECTION, SOLUTION INFILTRATION; PERINEURAL at 01:02

## 2024-02-29 RX ADMIN — LIDOCAINE HYDROCHLORIDE 2 ML: 10 INJECTION, SOLUTION INFILTRATION; PERINEURAL at 01:02

## 2024-02-29 RX ADMIN — MIDAZOLAM HYDROCHLORIDE 1 MG: 1 INJECTION INTRAMUSCULAR; INTRAVENOUS at 01:02

## 2024-02-29 RX ADMIN — SODIUM CHLORIDE 1000 ML: 9 INJECTION, SOLUTION INTRAVENOUS at 11:02

## 2024-02-29 NOTE — Clinical Note
Left and Right: Back and Hip.   Scrubbed with Chlorhexidine/Alcohol.    Hair: N/A.  Skin prep dry before draping.  Prepped by: Gene Caceres MD 2/29/2024 1:31 PM.

## 2024-02-29 NOTE — PLAN OF CARE
Pt arrived to IR for bone biopsy. Pt oriented to unit and staff. Plan of care reviewed with patient, patient verbalizes understanding. Comfort measures utilized. Pt safely transferred from stretcher to procedural table. Fall risk reviewed with patient, fall risk interventions maintained. Safety strap applied, positioner pillows utilized to minimize pressure points. Blankets applied. Pt prepped and draped utilizing standard sterile technique. Patient placed on continuous monitoring, as required by sedation policy. Timeouts completed utilizing standard universal time-out, per department and facility policy. RN to remain at bedside, continuous monitoring maintained. Pt resting comfortably. Denies pain/discomfort. Will continue to monitor. See flow sheets for monitoring, medication administration, and updates.

## 2024-02-29 NOTE — DISCHARGE INSTRUCTIONS
DIET: You may resume your home diet. If nausea is present, increase your diet gradually with fluids and bland  Foods    ACTIVITY LEVEL: You have received sedation or an anesthetic, you may feel sleepy for several hours. Rest until  you are more awake. Gradually resume your normal activities    No heavy lifting, strenuous activity, exercising or submerging in water x 2 days.       Medications: Pain medication should be taken only if needed and as directed. If antibiotics are prescribed, the  medication should be taken until completed.    No driving, alcoholic beverages or signing legal documents for next 24 hours or while taking pain  medication.    CALL THE DOCTOR:  For any obvious bleeding (some dried blood over the incision is normal).  Redness, swelling, foul smell around incision or fever over 101.  Shortness of breath, Coughing up Bloody sputum, Pains or Swelling in your Calves .  Persistent pain or nausea not relieved by medication.    Contact us with any questions or concerns:    INTERVENTIONAL RADIOLOGY  [phone]   After-Hours  Hospital paging :  [phone]  Ask for the interventional radiologist on call   If not available call your Attending surgeon    If any unusual problems or difficulties occur contact your doctor. If you cannot contact your doctor but  feel your signs and symptoms warrant a physicians attention return to the emergency room.          Fall Prevention  Millions of people fall every year and injure themselves. You may have had anesthesia or sedation which may increase your risk of falling. You may have health issues that put you at an increased risk of falling.     Here are ways to reduce your risk of falling.    Make your home safe by keeping walkways clear of objects you may trip over.  Use non-slip pads under rugs. Do not use area rugs or small throw rugs.  Use non-slip mats in bathtubs and showers.  Install handrails and lights on staircases.  Do not walk in poorly  lit areas.  Do not stand on chairs or wobbly ladders.  Use caution when reaching overhead or looking upward. This position can cause a loss of balance.  Be sure your shoes fit properly, have non-slip bottoms and are in good condition.   Wear shoes both inside and out. Avoid going barefoot or wearing slippers.  Be cautious when going up and down stairs, curbs, and when walking on uneven sidewalks.  If your balance is poor, consider using a cane or walker.  If your fall was related to alcohol use, stop or limit alcohol intake.   If your fall was related to use of sleeping medicines, talk to your doctor about this. You may need to reduce your dosage at bedtime if you awaken during the night to go to the bathroom.    To reduce the need for nighttime bathroom trips:  Avoid drinking fluids for several hours before going to bed  Empty your bladder before going to bed  Men can keep a urinal at the bedside  Stay as active as you can. Balance, flexibility, strength, and endurance all come from exercise. They all play a role in preventing falls. Ask your healthcare provider which types of activity are right for you.  Get your vision checked on a regular basis.  If you have pets, know where they are before you stand up or walk so you don't trip over them.  Use night lights.

## 2024-02-29 NOTE — PLAN OF CARE
Procedure completed, pt tolerated well. No apparent distress noted. Band aid applied CDI. Specimens collected and handed to pathologist. Patient to transfer to [facility] for recovery. Report called to JUAN J Vargas.

## 2024-02-29 NOTE — BRIEF OP NOTE
Radiology Post-Procedure Note    Pre Op Diagnosis: 1. Newly noted hypermetabolic diffuse LAD with suspicious hepatic, splenic and osseous lesions  Post Op Diagnosis: Same    Procedure: 1. CT-guided percutaneous Lt posterior iliac crest-approach 11-gauge and 13-gauge targeted core biopsies of a suspicious, hypermetabolic Lt iliac lytic osseous lesion    Procedure performed by: Gene Caceres MD    Written Informed Consent Obtained: Yes  Specimen Removed: YES, 13-G and 11-G, 1.0-cm core bone samples x 3  Estimated Blood Loss: Minimal    Findings:   Successful CT-guided percutaneous Lt posterior iliac crest-approach 11-gauge and 13-gauge targeted core biopsies of a suspicious, hypermetabolic Lt iliac lytic osseous lesion with local anesthetic and moderate conscious sedation. Patient tolerated the procedure well. No immediate post-procedural complications noted.     Patient transferred back to \[facility]\"" for 1 hour post-op monitoring and bed rest prior to tentative discharge.    No heavy lifting, strenuous activity, exercising or submerging in water x 2 days.    Thank you for considering IR for the care of your patient.     Gene Caceres MD  Interventional Radiology

## 2024-02-29 NOTE — H&P
Radiology History & Physical      SUBJECTIVE:     Principle Problem: Newly noted hypermetabolic diffuse LAD with suspicious hepatic, splenic and osseous lesions    History of Present Illness:  Catalino Mcfadden is a 58 y.o. male with PMHx of HPLD, HTN, DMII, ESRD on HD and recent PET-CT revealing newly noted hypermetabolic diffuse LAD above/below the diaphragm, suspicious hepatic and splenic lesions and multiple suspicious wide-spread osseous lytic lesions concerning for wide-spread metastatic disease of unknown primary requiring image-guided tissue sampling for diagnosis and treatment planning.     A new outpatient IR consult received for CT-guided percutaneous Lt posterior iliac crest-approach 11-gauge and 13-gauge targeted core biopsies of a suspicious, hypermetabolic Lt iliac lytic osseous lesion.    Past Medical History:   Diagnosis Date    Cancer     Diabetes mellitus, type 2     Diabetic foot ulcer associated with diabetes mellitus due to underlying condition 07/16/2020    ESRD on hemodialysis     Hyperlipidemia     Hypertension     Obese      Past Surgical History:   Procedure Laterality Date    AV FISTULA PLACEMENT Left 07/06/2023    Procedure: CREATION, AV FISTULA;  Surgeon: Daniel Poon MD;  Location: [facility] OR;  Service: Vascular;  Laterality: Left;  RN PREOP 6/28/2023  LABS DAY OF SURGERY    BONE BIOPSY Left 07/17/2020    Procedure: BIOPSY, BONE;  Surgeon: Verona Whitmore DPM;  Location: [facility] OR;  Service: Podiatry;  Laterality: Left;    CERVICAL DISC SURGERY  07/11/2016    DEBRIDEMENT Left 07/17/2020    Procedure: DEBRIDEMENT;  Surgeon: Verona Whitmore DPM;  Location: [facility] OR;  Service: Podiatry;  Laterality: Left;    DEBRIDEMENT OF FOOT Right     toes & plantar surface    ESOPHAGEAL MANOMETRY WITH MEASUREMENT OF IMPEDANCE N/A 03/27/2023    Procedure: MANOMETRY, ESOPHAGUS, WITH IMPEDANCE MEASUREMENT;  Surgeon: Roopa Pérez MD;  Location: [facility] ENDO ([address] [place] [address]);  Service: Endoscopy;  Laterality:  N/A;  Belching and rumination protocol please  instructions via portal - sm    ESOPHAGOGASTRODUODENOSCOPY N/A 12/16/2022    Procedure: EGD (ESOPHAGOGASTRODUODENOSCOPY);  Surgeon: Eren Francois MD;  Location: [facility] ENDO;  Service: Endoscopy;  Laterality: N/A;  Pt. on Dialysis. K+ ordered prior to procedure.EC    ESOPHAGOGASTRODUODENOSCOPY N/A 12/5/2023    Procedure: EGD (ESOPHAGOGASTRODUODENOSCOPY);  Surgeon: Kash Johnston MD;  Location: [facility] ENDO;  Service: Endoscopy;  Laterality: N/A;    FRACTURE SURGERY Right     medial ankle, metal plate present    INJECTION OF ANESTHETIC AGENT AROUND NERVE Right 2/2/2024    Procedure: BLOCK, NERVE STELLATE GANGLION *HOLD ASPIRIN 5 DAYS PRIOR*;  Surgeon: Gokul Gonzalez MD;  Location: [facility] PAIN MGT;  Service: Pain Management;  Laterality: Right;  [phone]    INSERTION OF TUNNELED CENTRAL VENOUS CATHETER (CVC) WITH SUBCUTANEOUS PORT N/A 06/11/2021    Procedure: TUNNEL CATH INSERTION WITHOUT PORT;  Surgeon: Jose Manuel Diagnostic Provider;  Location: [facility] OR;  Service: Radiology;  Laterality: N/A;  11AM START---PHONE PREOP 6/10/21---COVID POSTIVE ON 7/2020---NO S/S    left leg orthopedic surgery Left     UPPER GASTROINTESTINAL ENDOSCOPY       Home Meds:   Prior to Admission medications    Medication Sig Start Date End Date Taking? Authorizing Provider   NIFEdipine (PROCARDIA XL) 90 MG (OSM) 24 hr tablet Take 1 tablet (90 mg total) by mouth once daily. 5/23/23 5/22/24 Yes Mary Jo Villatoro MD   sevelamer carbonate (RENVELA) 800 mg Tab Take 1 tablet (800 mg total) by mouth 3 (three) times daily with meals. 2/1/24 1/31/25 Yes Samreen Salvador PA-C   amitriptyline (ELAVIL) 10 MG tablet Take 1 tablet (10 mg total) by mouth every evening.  Patient not taking: Reported on 2/28/2024 12/5/23 12/4/24  Yohana Perry NP   aspirin 81 MG Chew Take 1 tablet (81 mg total) by mouth once daily. 12/12/23 2/29/24  Belén Christine, FNP   hydrALAZINE (APRESOLINE) 100 MG tablet Take  1 tablet (100 mg total) by mouth 3 (three) times daily. 2/22/23 2/28/24  Laura Crowder MD   methoxy peg-epoetin beta (MIRCERA INJ) Inject 50 mcg into the skin every 28 days. 7/8/23 11/16/24  Moo Herrera   ondansetron (ZOFRAN-ODT) 4 MG TbDL Take 2 tablets (8 mg total) by mouth every 8 (eight) hours as needed (nausea vomiting).  Patient not taking: Reported on 2/28/2024 12/12/23   Belén Christine FNP   promethazine (PHENERGAN) 12.5 MG Tab Take 1 tablet (12.5 mg total) by mouth every 6 (six) hours as needed (nausea and vomiting).  Patient not taking: Reported on 2/28/2024 4/10/23   Roopa Pérez MD     Anticoagulants/Antiplatelets: no anticoagulation    Allergies: Review of patient's allergies indicates:  No Known Allergies    Sedation History:  no adverse reactions    Review of Systems:   Hematological: positive for - weight loss  Respiratory: no cough, shortness of breath, or wheezing  Cardiovascular: no chest pain or dyspnea on exertion  Gastrointestinal: no abdominal pain, change in bowel habits, or black or bloody stools  Genito-Urinary: no dysuria, trouble voiding, or hematuria  Musculoskeletal: positive for - joint pain and muscular weakness  Neurological: no TIA or stroke symptoms  positive for - gait disturbance, impaired coordination/balance, and numbness/tingling     OBJECTIVE:     Vital Signs (Most Recent)  Temp: 98.3 °F (36.8 °C) (02/29/24 1055)  Pulse: 78 (02/29/24 1055)  Resp: 18 (02/29/24 1055)  BP: 130/63 (02/29/24 1055)  SpO2: 98 % (02/29/24 1055)    Physical Exam:  ASA: III  Mallampati: II    General: no acute distress  Mental Status: alert and oriented to person, place and time  HEENT: normocephalic, atraumatic  Chest: unlabored breathing  Heart: regular heart rate  Abdomen: nondistended  Extremity: moves all extremities    Laboratory  Lab Results   Component Value Date    INR 1.1 02/26/2024       Lab Results   Component Value Date    WBC 4.10 02/26/2024    HGB 10.3 (L)  02/26/2024    HCT 31.8 (L) 02/26/2024    MCV 87 02/26/2024    PLT 85 (L) 02/26/2024      Lab Results   Component Value Date    GLU 71 02/26/2024     (L) 02/26/2024    K 4.0 02/26/2024    CL 94 (L) 02/26/2024    CO2 28 02/26/2024    BUN 23 (H) 02/26/2024    CREATININE 5.8 (H) 02/26/2024    CALCIUM 9.5 02/26/2024    MG 2.4 12/18/2023    ALT 26 02/26/2024    AST 43 (H) 02/26/2024    ALBUMIN 2.6 (L) 02/26/2024    BILITOT 1.2 (H) 02/26/2024    BILIDIR 0.3 02/23/2022     ASSESSMENT/PLAN:     58 y.o. male with recent PET-CT revealing newly noted hypermetabolic diffuse LAD above/below the diaphragm, suspicious hepatic and splenic lesions and multiple suspicious wide-spread osseous lytic lesions concerning for wide-spread metastatic disease of unknown primary requiring image-guided tissue sampling for diagnosis and treatment planning.     Newly noted hypermetabolic diffuse LAD with suspicious hepatic, splenic and osseous lesions - Will attempt CT-guided percutaneous Lt posterior iliac crest-approach 11-gauge and 13-gauge targeted core biopsies of a suspicious, hypermetabolic Lt iliac lytic osseous lesion with local anesthetic and moderate conscious sedation.    Risks (including, but not limited to, pain, bleeding, infection, damage to nearby structures, failure to obtain sufficient material for a diagnosis, the need for additional procedures, and death), benefits, and alternatives were discussed with the patient. All questions were answered to the best of my abilities. The patient wishes to proceed with the procedure. Written informed consent was obtained.    Thank you for considering IR for the care of your patient.     Gene Caceres MD  Interventional Radiology

## 2024-02-29 NOTE — DISCHARGE SUMMARY
Radiology Discharge Summary      Hospital Course: No complications    Admit Date: 2/29/2024  Discharge Date: 02/29/2024     Instructions Given to Patient: Yes    Diet: Resume prior diet    Activity: No driving for today.  No heavy lifting, strenuous activity, exercising or submerging in water x 2 days.    Description of Condition on Discharge: Stable    Vital Signs (Most Recent): Temp: 98.3 °F (36.8 °C) (02/29/24 1055)  Pulse: 81 (02/29/24 1405)  Resp: 14 (02/29/24 1405)  BP: (!) 140/72 (02/29/24 1405)  SpO2: 97 % (02/29/24 1405)    Discharge Disposition: Home    Discharge Diagnosis:  58 y.o. male with recent PET-CT revealing newly noted hypermetabolic diffuse LAD above/below the diaphragm, suspicious hepatic and splenic lesions and multiple suspicious wide-spread osseous lytic lesions concerning for wide-spread metastatic disease of unknown primary requiring image-guided tissue sampling for diagnosis and treatment planning.     Patient is now s/p successful CT-guided percutaneous Lt posterior iliac crest-approach 11-gauge and 13-gauge targeted core biopsies of a suspicious, hypermetabolic Lt iliac lytic osseous lesion with local anesthetic and moderate conscious sedation. Patient tolerated the procedure well. No immediate post-procedural complications noted.     No heavy lifting, strenuous activity, exercising or submerging in water x 2 days.    Thank you for considering IR for the care of your patient.     Gene Caceres MD  Interventional Radiology

## 2024-03-01 ENCOUNTER — TELEPHONE (OUTPATIENT)
Dept: PULMONOLOGY | Facility: CLINIC | Age: 59
End: 2024-03-01
Payer: COMMERCIAL

## 2024-03-01 ENCOUNTER — HOSPITAL ENCOUNTER (OUTPATIENT)
Facility: HOSPITAL | Age: 59
Discharge: HOME | End: 2024-03-01
Attending: EMERGENCY MEDICINE | Admitting: RADIOLOGY
Payer: COMMERCIAL

## 2024-03-01 ENCOUNTER — ANESTHESIA (OUTPATIENT)
Dept: SURGERY | Facility: HOSPITAL | Age: 59
End: 2024-03-01
Payer: COMMERCIAL

## 2024-03-01 VITALS
OXYGEN SATURATION: 97 % | TEMPERATURE: 98 F | RESPIRATION RATE: 19 BRPM | HEART RATE: 77 BPM | BODY MASS INDEX: 25.45 KG/M2 | HEIGHT: 73 IN | SYSTOLIC BLOOD PRESSURE: 153 MMHG | WEIGHT: 192 LBS | DIASTOLIC BLOOD PRESSURE: 72 MMHG

## 2024-03-01 DIAGNOSIS — R59.1 DIFFUSE LYMPHADENOPATHY: ICD-10-CM

## 2024-03-01 DIAGNOSIS — C78.01 MALIGNANT NEOPLASM METASTATIC TO BOTH LUNGS: ICD-10-CM

## 2024-03-01 DIAGNOSIS — C78.02 MALIGNANT NEOPLASM METASTATIC TO BOTH LUNGS: ICD-10-CM

## 2024-03-01 DIAGNOSIS — R59.0 MEDIASTINAL ADENOPATHY: ICD-10-CM

## 2024-03-01 DIAGNOSIS — C79.51 METASTASIS TO BONE: ICD-10-CM

## 2024-03-01 LAB
GRAM STN SPEC: NORMAL
GRAM STN SPEC: NORMAL
POCT GLUCOSE: 83 MG/DL (ref 70–110)
POCT GLUCOSE: 85 MG/DL (ref 70–110)
POCT GLUCOSE: 87 MG/DL (ref 70–110)

## 2024-03-01 PROCEDURE — 88112 CYTOPATH CELL ENHANCE TECH: CPT | Mod: 26,59,, | Performed by: PATHOLOGY

## 2024-03-01 PROCEDURE — 63600175 PHARM REV CODE 636 W HCPCS

## 2024-03-01 PROCEDURE — 88177 CYTP FNA EVAL EA ADDL: CPT | Performed by: PATHOLOGY

## 2024-03-01 PROCEDURE — 25000003 PHARM REV CODE 250

## 2024-03-01 PROCEDURE — 31653 BRONCH EBUS SAMPLNG 3/> NODE: CPT | Mod: ,,, | Performed by: EMERGENCY MEDICINE

## 2024-03-01 PROCEDURE — 25000003 PHARM REV CODE 250: Performed by: EMERGENCY MEDICINE

## 2024-03-01 PROCEDURE — 87206 SMEAR FLUORESCENT/ACID STAI: CPT | Performed by: EMERGENCY MEDICINE

## 2024-03-01 PROCEDURE — 27201423 OPTIME MED/SURG SUP & DEVICES STERILE SUPPLY: Performed by: EMERGENCY MEDICINE

## 2024-03-01 PROCEDURE — 71000044 HC DOSC ROUTINE RECOVERY FIRST HOUR: Performed by: EMERGENCY MEDICINE

## 2024-03-01 PROCEDURE — C1726 CATH, BAL DIL, NON-VASCULAR: HCPCS | Performed by: EMERGENCY MEDICINE

## 2024-03-01 PROCEDURE — 87076 CULTURE ANAEROBE IDENT EACH: CPT | Performed by: EMERGENCY MEDICINE

## 2024-03-01 PROCEDURE — 82962 GLUCOSE BLOOD TEST: CPT | Performed by: EMERGENCY MEDICINE

## 2024-03-01 PROCEDURE — 31624 DX BRONCHOSCOPE/LAVAGE: CPT | Mod: 51,,, | Performed by: EMERGENCY MEDICINE

## 2024-03-01 PROCEDURE — 37000009 HC ANESTHESIA EA ADD 15 MINS: Performed by: EMERGENCY MEDICINE

## 2024-03-01 PROCEDURE — D9220A PRA ANESTHESIA: Mod: ,,, | Performed by: ANESTHESIOLOGY

## 2024-03-01 PROCEDURE — 36000704 HC OR TIME LEV I 1ST 15 MIN: Performed by: EMERGENCY MEDICINE

## 2024-03-01 PROCEDURE — 37000008 HC ANESTHESIA 1ST 15 MINUTES: Performed by: EMERGENCY MEDICINE

## 2024-03-01 PROCEDURE — 88177 CYTP FNA EVAL EA ADDL: CPT | Mod: 26,,, | Performed by: PATHOLOGY

## 2024-03-01 PROCEDURE — 87116 MYCOBACTERIA CULTURE: CPT | Performed by: EMERGENCY MEDICINE

## 2024-03-01 PROCEDURE — 88189 FLOWCYTOMETRY/READ 16 & >: CPT | Mod: ,,, | Performed by: PATHOLOGY

## 2024-03-01 PROCEDURE — 87015 SPECIMEN INFECT AGNT CONCNTJ: CPT | Performed by: EMERGENCY MEDICINE

## 2024-03-01 PROCEDURE — 88173 CYTOPATH EVAL FNA REPORT: CPT | Performed by: PATHOLOGY

## 2024-03-01 PROCEDURE — 88185 FLOWCYTOMETRY/TC ADD-ON: CPT | Performed by: PATHOLOGY

## 2024-03-01 PROCEDURE — 87075 CULTR BACTERIA EXCEPT BLOOD: CPT | Performed by: EMERGENCY MEDICINE

## 2024-03-01 PROCEDURE — 36000705 HC OR TIME LEV I EA ADD 15 MIN: Performed by: EMERGENCY MEDICINE

## 2024-03-01 PROCEDURE — 87070 CULTURE OTHR SPECIMN AEROBIC: CPT | Performed by: EMERGENCY MEDICINE

## 2024-03-01 PROCEDURE — 88173 CYTOPATH EVAL FNA REPORT: CPT | Mod: 26,,, | Performed by: PATHOLOGY

## 2024-03-01 PROCEDURE — 88172 CYTP DX EVAL FNA 1ST EA SITE: CPT | Performed by: PATHOLOGY

## 2024-03-01 PROCEDURE — 71000045 HC DOSC ROUTINE RECOVERY EA ADD'L HR: Performed by: EMERGENCY MEDICINE

## 2024-03-01 PROCEDURE — 88172 CYTP DX EVAL FNA 1ST EA SITE: CPT | Mod: 26,,, | Performed by: PATHOLOGY

## 2024-03-01 PROCEDURE — 31624 DX BRONCHOSCOPE/LAVAGE: CPT | Mod: 59,RT | Performed by: EMERGENCY MEDICINE

## 2024-03-01 PROCEDURE — 88112 CYTOPATH CELL ENHANCE TECH: CPT | Performed by: PATHOLOGY

## 2024-03-01 PROCEDURE — 71000015 HC POSTOP RECOV 1ST HR: Performed by: EMERGENCY MEDICINE

## 2024-03-01 PROCEDURE — 87205 SMEAR GRAM STAIN: CPT | Performed by: EMERGENCY MEDICINE

## 2024-03-01 PROCEDURE — 88305 TISSUE EXAM BY PATHOLOGIST: CPT | Mod: 59 | Performed by: PATHOLOGY

## 2024-03-01 PROCEDURE — 88184 FLOWCYTOMETRY/ TC 1 MARKER: CPT | Performed by: PATHOLOGY

## 2024-03-01 PROCEDURE — 88305 TISSUE EXAM BY PATHOLOGIST: CPT | Mod: 26,,, | Performed by: PATHOLOGY

## 2024-03-01 PROCEDURE — 87102 FUNGUS ISOLATION CULTURE: CPT | Performed by: EMERGENCY MEDICINE

## 2024-03-01 RX ORDER — PROPOFOL 10 MG/ML
VIAL (ML) INTRAVENOUS
Status: DISCONTINUED | OUTPATIENT
Start: 2024-03-01 | End: 2024-03-01

## 2024-03-01 RX ORDER — ROCURONIUM BROMIDE 10 MG/ML
INJECTION, SOLUTION INTRAVENOUS
Status: DISCONTINUED | OUTPATIENT
Start: 2024-03-01 | End: 2024-03-01

## 2024-03-01 RX ORDER — DEXAMETHASONE SODIUM PHOSPHATE 4 MG/ML
INJECTION, SOLUTION INTRA-ARTICULAR; INTRALESIONAL; INTRAMUSCULAR; INTRAVENOUS; SOFT TISSUE
Status: DISCONTINUED | OUTPATIENT
Start: 2024-03-01 | End: 2024-03-01

## 2024-03-01 RX ORDER — LIDOCAINE HYDROCHLORIDE 10 MG/ML
INJECTION, SOLUTION EPIDURAL; INFILTRATION; INTRACAUDAL; PERINEURAL
Status: DISCONTINUED | OUTPATIENT
Start: 2024-03-01 | End: 2024-03-01 | Stop reason: HOSPADM

## 2024-03-01 RX ORDER — PROPOFOL 10 MG/ML
INJECTION, EMULSION INTRAVENOUS CONTINUOUS PRN
Status: DISCONTINUED | OUTPATIENT
Start: 2024-03-01 | End: 2024-03-01

## 2024-03-01 RX ORDER — FENTANYL CITRATE 50 UG/ML
INJECTION, SOLUTION INTRAMUSCULAR; INTRAVENOUS
Status: DISCONTINUED | OUTPATIENT
Start: 2024-03-01 | End: 2024-03-01

## 2024-03-01 RX ORDER — ONDANSETRON HYDROCHLORIDE 2 MG/ML
INJECTION, SOLUTION INTRAVENOUS
Status: DISCONTINUED | OUTPATIENT
Start: 2024-03-01 | End: 2024-03-01

## 2024-03-01 RX ORDER — FENTANYL CITRATE 50 UG/ML
25 INJECTION, SOLUTION INTRAMUSCULAR; INTRAVENOUS EVERY 5 MIN PRN
Status: DISCONTINUED | OUTPATIENT
Start: 2024-03-01 | End: 2024-03-01 | Stop reason: HOSPADM

## 2024-03-01 RX ORDER — HALOPERIDOL 5 MG/ML
0.5 INJECTION INTRAMUSCULAR EVERY 10 MIN PRN
Status: DISCONTINUED | OUTPATIENT
Start: 2024-03-01 | End: 2024-03-01 | Stop reason: HOSPADM

## 2024-03-01 RX ORDER — MIDAZOLAM HYDROCHLORIDE 1 MG/ML
INJECTION, SOLUTION INTRAMUSCULAR; INTRAVENOUS
Status: DISCONTINUED | OUTPATIENT
Start: 2024-03-01 | End: 2024-03-01

## 2024-03-01 RX ORDER — LIDOCAINE HYDROCHLORIDE 20 MG/ML
INJECTION, SOLUTION EPIDURAL; INFILTRATION; INTRACAUDAL; PERINEURAL
Status: DISCONTINUED | OUTPATIENT
Start: 2024-03-01 | End: 2024-03-01

## 2024-03-01 RX ORDER — SODIUM CHLORIDE 0.9 % (FLUSH) 0.9 %
10 SYRINGE (ML) INJECTION
Status: DISCONTINUED | OUTPATIENT
Start: 2024-03-01 | End: 2024-03-01 | Stop reason: HOSPADM

## 2024-03-01 RX ADMIN — LIDOCAINE HYDROCHLORIDE 40 MG: 20 INJECTION, SOLUTION EPIDURAL; INFILTRATION; INTRACAUDAL; PERINEURAL at 11:03

## 2024-03-01 RX ADMIN — MIDAZOLAM HYDROCHLORIDE 2 MG: 1 INJECTION, SOLUTION INTRAMUSCULAR; INTRAVENOUS at 11:03

## 2024-03-01 RX ADMIN — DEXAMETHASONE SODIUM PHOSPHATE 4 MG: 4 INJECTION, SOLUTION INTRAMUSCULAR; INTRAVENOUS at 12:03

## 2024-03-01 RX ADMIN — ROCURONIUM BROMIDE 50 MG: 10 INJECTION, SOLUTION INTRAVENOUS at 11:03

## 2024-03-01 RX ADMIN — PROPOFOL 175 MCG/KG/MIN: 10 INJECTION, EMULSION INTRAVENOUS at 11:03

## 2024-03-01 RX ADMIN — SODIUM CHLORIDE: 0.9 INJECTION, SOLUTION INTRAVENOUS at 11:03

## 2024-03-01 RX ADMIN — ONDANSETRON 4 MG: 2 INJECTION INTRAMUSCULAR; INTRAVENOUS at 12:03

## 2024-03-01 RX ADMIN — PROPOFOL 150 MG: 10 INJECTION, EMULSION INTRAVENOUS at 11:03

## 2024-03-01 RX ADMIN — FENTANYL CITRATE 100 MCG: 50 INJECTION, SOLUTION INTRAMUSCULAR; INTRAVENOUS at 11:03

## 2024-03-01 NOTE — ANESTHESIA PROCEDURE NOTES
Intubation    Date/Time: 3/1/2024 11:52 AM    Performed by: Esperanza Robb MD  Authorized by: Esperanza Robb MD    Intubation:     Induction:  Intravenous    Intubated:  Postinduction    Mask Ventilation:  Easy mask    Attempts:  1    Attempted By:  CRNA    Method of Intubation:  Video laryngoscopy    Blade:  Whitley 3    Laryngeal View Grade: Grade I - full view of cords      Difficult Airway Encountered?: No      Complications:  None    Airway Device:  Oral endotracheal tube    Airway Device Size:  8.0    Style/Cuff Inflation:  Cuffed (inflated to minimal occlusive pressure)    Tube secured:  25    Secured at:  The lips    Placement Verified By:  Capnometry    Complicating Factors:  None    Findings Post-Intubation:  BS equal bilateral and atraumatic/condition of teeth unchanged

## 2024-03-01 NOTE — ANESTHESIA POSTPROCEDURE EVALUATION
Anesthesia Post Evaluation    Patient: Catalino Mcfadden    Procedure(s) Performed: Procedure(s) (LRB):  ENDOBRONCHIAL ULTRASOUND (EBUS) (N/A)    Final Anesthesia Type: general      Level of consciousness: awake and alert  Post-procedure vital signs: reviewed and stable  Pain control: Pain has been treated.  Airway patency: patent    PONV status: Absent or treated.  Anesthetic complications: no      Cardiovascular status: hemodynamically stable  Respiratory status: unassisted  Hydration status: euvolemic                Vitals Value Taken Time   /72 03/01/24 1401   Temp 36.7 °C (98.1 °F) 03/01/24 1259   Pulse 71 03/01/24 1402   Resp 19 03/01/24 1345   SpO2 96 % 03/01/24 1402   Vitals shown include unvalidated device data.      No case tracking events are documented in the log.      Pain/Madiha Score: Madiha Score: 9 (3/1/2024  1:15 PM)  Modified Madiha Score: 15 (3/1/2024 12:57 PM)

## 2024-03-01 NOTE — ANESTHESIA PREPROCEDURE EVALUATION
[facility]  Anesthesia Pre-Operative Evaluation         Patient Name: Catalino Mcfadden  YOB: 1965  MRN: [id number]    SUBJECTIVE:     Pre-operative evaluation for Procedure(s) (LRB):  ENDOBRONCHIAL ULTRASOUND (EBUS) (N/A)     02/29/2024    Catalino Mcfadden is a 58 y.o. male w/ a significant PMHx of HTN, DMII, HLD, ESRD on HD, h/o C-spine surgery (2016), and recent PET-CT revealing newly noted hypermetabolic diffuse LAD above/below the diaphragm, suspicious hepatic and splenic lesions and multiple suspicious wide-spread osseous lytic lesions concerning for wide-spread metastatic disease of unknown primary.    Patient now presents for the above procedure(s).    TTE (7/28/21):  · The left ventricle is normal in size with normal systolic function.  · Mild left atrial enlargement.  · The estimated ejection fraction is 55%.  · Indeterminate left ventricular diastolic function.  · Normal right ventricular size with normal right ventricular systolic function.  · The study was difficult due to patient's poor endocardial visualization.  · Small moderate circumferential pericardial effusion.  · Intermediate central venous pressure (8 mmHg).    Prev airway: None documented.    Patient Active Problem List   Diagnosis    Type 2 diabetes mellitus with stage 5 chronic kidney disease    Essential hypertension    Hyperlipidemia, acquired    ED (erectile dysfunction)    History of diabetic ulcer of foot    Osteomyelitis of second toe of left foot    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    Long term (current) use of antibiotics    Acute osteomyelitis of metatarsal bone of left foot    Diabetic ulcer of toe of left foot    Hyponatremia    Osteomyelitis of left foot    Hypoalbuminemia    Healed ulcer of left foot on examination     Iron deficiency anemia    Metabolic acidosis    Anemia due to chronic kidney disease, on chronic dialysis    Diabetic ulcer of left midfoot associated with type 2 diabetes mellitus, with fat layer exposed    Type II diabetes mellitus with neurological manifestations    Foot ulcer, right, with fat layer exposed    Foot ulceration, left, with fat layer exposed    ESRD on hemodialysis    Malignant renovascular hypertension    Calcified granuloma of lung    Abnormal findings on diagnostic imaging of lung    Tracheobronchomalacia    Intractable hiccups    Serum total bilirubin elevated    Hypertensive emergency    Lower extremity weakness    Abnormal MRI, lumbar spine    Goals of care, counseling/discussion    DJD (degenerative joint disease)    Complete lesion of L1 level of lumbar spinal cord    Debility    Metastasis to bone    Metastasis to lung    Metastasis to liver    Diffuse lymphadenopathy    Lytic bone lesions on xray    Hepatic lesion    Splenic lesion       Review of patient's allergies indicates:  No Known Allergies    Current Inpatient Medications:      No current facility-administered medications on file prior to encounter.     Current Outpatient Medications on File Prior to Encounter   Medication Sig Dispense Refill    amitriptyline (ELAVIL) 10 MG tablet Take 1 tablet (10 mg total) by mouth every evening. (Patient not taking: Reported on 2/28/2024) 30 tablet 0    aspirin 81 MG Chew Take 1 tablet (81 mg total) by mouth once daily. 30 tablet 0    hydrALAZINE (APRESOLINE) 100 MG tablet Take 1 tablet (100 mg total) by mouth 3 (three) times daily. 90 tablet 0    methoxy peg-epoetin beta (MIRCERA INJ) Inject 50 mcg into the skin every 28 days.      NIFEdipine (PROCARDIA XL) 90 MG (OSM) 24 hr tablet Take 1 tablet (90 mg total) by mouth once daily. 90 tablet 3    ondansetron (ZOFRAN-ODT) 4 MG TbDL Take 2 tablets (8 mg total) by mouth every 8 (eight) hours as needed (nausea vomiting). (Patient not taking:  Reported on 2/28/2024) 30 tablet 0    promethazine (PHENERGAN) 12.5 MG Tab Take 1 tablet (12.5 mg total) by mouth every 6 (six) hours as needed (nausea and vomiting). (Patient not taking: Reported on 2/28/2024) 30 tablet 3    sevelamer carbonate (RENVELA) 800 mg Tab Take 1 tablet (800 mg total) by mouth 3 (three) times daily with meals. 270 tablet 3       Past Surgical History:   Procedure Laterality Date    AV FISTULA PLACEMENT Left 07/06/2023    Procedure: CREATION, AV FISTULA;  Surgeon: Daniel Poon MD;  Location: [facility];  Service: Vascular;  Laterality: Left;  RN PREOP 6/28/2023  LABS DAY OF SURGERY    BONE BIOPSY Left 07/17/2020    Procedure: BIOPSY, BONE;  Surgeon: Verona Whitmore DPM;  Location: [facility];  Service: Podiatry;  Laterality: Left;    CERVICAL DISC SURGERY  07/11/2016    DEBRIDEMENT Left 07/17/2020    Procedure: DEBRIDEMENT;  Surgeon: Verona Whitmore DPM;  Location: [facility];  Service: Podiatry;  Laterality: Left;    DEBRIDEMENT OF FOOT Right     toes & plantar surface    ESOPHAGEAL MANOMETRY WITH MEASUREMENT OF IMPEDANCE N/A 03/27/2023    Procedure: MANOMETRY, ESOPHAGUS, WITH IMPEDANCE MEASUREMENT;  Surgeon: Roopa Pérez MD;  [facility]  Service: Endoscopy;  Laterality: N/A;  Belching and rumination protocol please  instructions via portal -     ESOPHAGOGASTRODUODENOSCOPY N/A 12/16/2022    Procedure: EGD (ESOPHAGOGASTRODUODENOSCOPY);  Surgeon: Eren Francois MD;  Location: [facility];  Service: Endoscopy;  Laterality: N/A;  Pt. on Dialysis. K+ ordered prior to procedure.EC    ESOPHAGOGASTRODUODENOSCOPY N/A 12/5/2023    Procedure: EGD (ESOPHAGOGASTRODUODENOSCOPY);  Surgeon: Kash Johnston MD;  Location: [facility];  Service: Endoscopy;  Laterality: N/A;    FRACTURE SURGERY Right     medial ankle, metal plate present    INJECTION OF ANESTHETIC AGENT AROUND NERVE Right 2/2/2024    Procedure: BLOCK, NERVE STELLATE GANGLION *HOLD ASPIRIN 5 DAYS PRIOR*;   Surgeon: Gokul Gonzalez MD;  Location: [facility];  Service: Pain Management;  Laterality: Right;  [phone]    INSERTION OF TUNNELED CENTRAL VENOUS CATHETER (CVC) WITH SUBCUTANEOUS PORT N/A 06/11/2021    Procedure: TUNNEL CATH INSERTION WITHOUT PORT;  Surgeon: Jose Manuel Diagnostic Provider;  Location: [facility] OR;  Service: Radiology;  Laterality: N/A;  11AM START---PHONE PREOP 6/10/21---COVID POSTIVE ON 7/2020---NO S/S    left leg orthopedic surgery Left     UPPER GASTROINTESTINAL ENDOSCOPY         Social History     Socioeconomic History    Marital status: Other    Number of children: 1   Tobacco Use    Smoking status: Never    Smokeless tobacco: Never   Substance and Sexual Activity    Alcohol use: Not Currently     Comment: occ rare beer    Drug use: No    Sexual activity: Not Currently   Social History Narrative    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     Social Determinants of Health     Financial Resource Strain: Patient Declined (2/21/2024)    Overall Financial Resource Strain (CARDIA)     Difficulty of Paying Living Expenses: Patient declined   Food Insecurity: Unknown (2/21/2024)    Hunger Vital Sign     Worried About Running Out of Food in the Last Year: Patient declined   Transportation Needs: Unknown (2/21/2024)    PRAPARE - Transportation     Lack of Transportation (Medical): Patient declined   Physical Activity: Unknown (2/21/2024)    Exercise Vital Sign     Days of Exercise per Week: 2 days   Stress: Stress Concern Present (9/26/2022)    [place] [place] [employer] - Occupational Stress Questionnaire     Feeling of Stress : To some extent   Social Connections: Unknown (2/21/2024)    Social Connection and Isolation Panel [NHANES]     Frequency of Communication with Friends and Family: Patient declined     Active Member of Clubs or Organizations: Patient declined     Attends Club or Organization Meetings: 1 to 4 times per year   Housing Stability: Low Risk  (9/26/2022)    Housing Stability Vital  Sign     Unable to Pay for Housing in the Last Year: No     Number of Places Lived in the Last Year: 1     Unstable Housing in the Last Year: No       OBJECTIVE:     Vital Signs Range (Last 24H):  Temp:  [36.7 °C (98 °F)-36.8 °C (98.3 °F)]   Pulse:  [67-81]   Resp:  [12-18]   BP: (130-157)/(63-76)   SpO2:  [95 %-100 %]       Significant Labs:  Lab Results   Component Value Date    WBC 4.10 02/26/2024    HGB 10.3 (L) 02/26/2024    HCT 31.8 (L) 02/26/2024    PLT 85 (L) 02/26/2024    CHOL 177 02/22/2023    TRIG 56 02/22/2023    HDL 41 02/22/2023    ALT 26 02/26/2024    AST 43 (H) 02/26/2024     (L) 02/26/2024    K 4.0 02/26/2024    CL 94 (L) 02/26/2024    CREATININE 5.8 (H) 02/26/2024    BUN 23 (H) 02/26/2024    CO2 28 02/26/2024    TSH 1.780 02/09/2022    PSA 1.6 02/23/2022    INR 1.1 02/26/2024    HGBA1C 5.1 02/22/2023       Diagnostic Studies: No relevant studies.    EKG:   Results for orders placed or performed during the hospital encounter of 12/11/23   EKG 12-lead    Collection Time: 12/11/23  3:34 PM    Narrative    Test Reason : R42,    Vent. Rate : 073 BPM     Atrial Rate : 073 BPM     P-R Int : 144 ms          QRS Dur : 080 ms      QT Int : 426 ms       P-R-T Axes : 009 004 050 degrees     QTc Int : 469 ms    Normal sinus rhythm  Minimal voltage criteria for LVH, may be normal variant  Borderline Abnormal ECG  When compared with ECG of 04-DEC-2023 15:04,  Criteria for Septal infarct are no longer Present  Confirmed by Emil Aj MD ([id number]) on 12/16/2023 9:28:58 AM    Referred By: [email or web address]   SELF           Confirmed By:Emil Aj MD       2D ECHO:  TTE:  Results for orders placed or performed during the hospital encounter of 07/27/21   Echo   Result Value Ref Range    BSA 2.56 m2    TDI SEPTAL 0.08 m/s    LV LATERAL E/E' RATIO 11.89 m/s    LV SEPTAL E/E' RATIO 13.38 m/s    LA WIDTH 3.50 cm    TDI LATERAL 0.09 m/s    LVIDd 5.59 3.5 - 6.0 cm    IVS 1.29 (A) 0.6 - 1.1 cm    Posterior Wall 0.85  0.6 - 1.1 cm    LVIDs 3.87 2.1 - 4.0 cm    FS 31 28 - 44 %    LA volume 88.62 cm3    Sinus 2.48 cm    STJ 2.45 cm    Ascending aorta 2.51 cm    LV mass 239.56 g    LA size 4.90 cm    RVDD 5.17 cm    TAPSE 1.98 cm    RV S' 18.21 cm/s    Left Ventricle Relative Wall Thickness 0.30 cm    AV mean gradient 5 mmHg    AV valve area 1.90 cm2    AV Velocity Ratio 0.72     AV index (prosthetic) 0.79     MV valve area p 1/2 method 3.61 cm2    E/A ratio 1.04     Mean e' 0.09 m/s    E wave deceleration time 210.34 msec    LVOT diameter 1.75 cm    LVOT area 2.4 cm2    LVOT peak young 0.91 m/s    LVOT peak VTI 21.42 cm    Ao peak young 1.26 m/s    Ao VTI 27.06 cm    LVOT stroke volume 51.50 cm3    AV peak gradient 6 mmHg    E/E' ratio 12.59 m/s    MV Peak E Young 1.07 m/s    MV stenosis pressure 1/2 time 61.00 ms    MV Peak A Young 1.03 m/s    LV Systolic Volume 64.89 mL    LV Systolic Volume Index 26.1 mL/m2    LV Diastolic Volume 153.13 mL    LV Diastolic Volume Index 61.50 mL/m2    LA Volume Index 35.6 mL/m2    LV Mass Index 96 g/m2    RA Major Axis 6.44 cm    Left Atrium Minor Axis 5.90 cm    Left Atrium Major Axis 6.27 cm    LA Volume Index (Mod) 20.3 mL/m2    LA volume (mod) 50.50 cm3    RA Width 5.33 cm    Right Atrial Pressure (from IVC) 8 mmHg    EF 55 %    Narrative    · The left ventricle is normal in size with normal systolic function.  · Mild left atrial enlargement.  · The estimated ejection fraction is 55%.  · Indeterminate left ventricular diastolic function.  · Normal right ventricular size with normal right ventricular systolic   function.  · The study was difficult due to patient's poor endocardial visualization.  · Small moderate circumferential pericardial effusion.  · Intermediate central venous pressure (8 mmHg).          STEVEN:  No results found. However, due to the size of the patient record, not all encounters were searched. Please check Results Review for a complete set of results.    ASSESSMENT/PLAN:            Pre-op Assessment    I have reviewed the Patient Summary Reports.     I have reviewed the Nursing Notes. I have reviewed the NPO Status.   I have reviewed the Medications.     Review of Systems  Anesthesia Hx:  No problems with previous Anesthesia   History of prior surgery of interest to airway management or planning:          Denies Family Hx of Anesthesia complications.    Denies Personal Hx of Anesthesia complications.                    Social:  Non-Smoker, No Alcohol Use       Hematology/Oncology:       -- Denies Anemia:                                  Cardiovascular:     Hypertension    Denies CAD.        Denies CHF.    hyperlipidemia                             Pulmonary:    Denies COPD.  Denies Asthma.                    Renal/:  Chronic Renal Disease, ESRD, Dialysis                Hepatic/GI:      Denies GERD.             Musculoskeletal:  Denies Arthritis.               Neurological:    Denies CVA.    Denies Seizures.                                Endocrine:  Diabetes, type 2           Psych:  Denies Psychiatric History.                     Anesthesia Plan  Type of Anesthesia, risks & benefits discussed:    Anesthesia Type: Gen ETT  Intra-op Monitoring Plan: Standard ASA Monitors  Post Op Pain Control Plan: multimodal analgesia and IV/PO Opioids PRN  Induction:  IV  Airway Plan: Direct, Post-Induction  Informed Consent: Informed consent signed with the Patient and all parties understand the risks and agree with anesthesia plan.  All questions answered.   ASA Score: 3  Day of Surgery Review of History & Physical: H&P Update referred to the surgeon/provider.  Anesthesia Plan Notes: Chart reviewed. Patient seen and examined. Anesthesia plan discussed and questions answered. E-consent signed. Manoj Garcia MD    Ready For Surgery From Anesthesia Perspective.     .

## 2024-03-01 NOTE — INTERVAL H&P NOTE
The patient has been examined and the H&P has been reviewed:    I concur with the findings and no changes have occurred since H&P was written.    Procedure risks, benefits and alternative options discussed and understood by patient/family.      Mediastinal and hilar adenopathy   Lytic lesions of bone.     Clinical/radiographic presentation c/w widely metastatic malignancy. Proceed with diagnostic EBUS.     Discussed procedure in detail with patient. Risks including bleeding, PTX, respiratory failure, non-diagnostic sample, need for additional procedures and numerous additional potential complications up to death outlined. He verbalized understanding and all questions answered to his satisfaction. Written consent signed and on chart.     Francisco Fleming MD   [facility]

## 2024-03-01 NOTE — TELEPHONE ENCOUNTER
Attempted to contact pt in regards to his message but no one answered pt telephone and no voicemail picked up to leave pt a message.

## 2024-03-01 NOTE — DISCHARGE SUMMARY
[facility] - [facility] (2nd Fl)  Discharge Note  Short Stay    Procedure(s) (LRB):  Endobronchial Ultrasound     EBUS Station 4R, 7, 11L  BAL: RML    OUTCOME: Patient tolerated treatment/procedure well without complication and is now ready for discharge.    DISPOSITION: Home or Self Care    FINAL DIAGNOSIS:  Mediastinal adenopathy    FOLLOWUP: In clinic as scheduled. Will call with results.     DISCHARGE INSTRUCTIONS:  Written and verbal discharge instructions provided.     TIME SPENT ON DISCHARGE: 25 minutes    Jose Miguel Wilson M.D.  [facility]

## 2024-03-01 NOTE — PLAN OF CARE
Discharge instructions given and explained to patient and family with verbalized understanding. VSS. Denies N/V. Rates pain level as tolerable. IV removed. Pt left floor via W/C, stable for transport. Family -Mr. Lucio Mcfaddne is available for transport home.

## 2024-03-01 NOTE — TELEPHONE ENCOUNTER
----- Message from Raissa Reddy sent at 2/29/2024  5:20 PM CST -----  Type:  Needs Medical Advice    Who Called:  Pt  Would the patient rather a call back or a response via [email or web address]? Callback   Best Call Back Number: [phone]  Additional Information: Pt requesting a callback in regards to arrival time for appt

## 2024-03-01 NOTE — TELEPHONE ENCOUNTER
I spoke with patient at 8:29am. Mr Mcfadden called stating he did not know his arrival time for scheduled EBUS today with Dr Fleming. I let him know his arrival time to [facility] was for 9am. Patient stated he still needed to take a shower and get ready. I let him know he needed to get here as soon as he can. Mr Mcfadden stated he has not ate since yesterday. I let him know do not eat anything. Patient verbalized understanding.

## 2024-03-01 NOTE — TRANSFER OF CARE
"Anesthesia Transfer of Care Note    Patient: Catalino Mcfadden    Procedure(s) Performed: Procedure(s) (LRB):  ENDOBRONCHIAL ULTRASOUND (EBUS) (N/A)    Patient location: [facility]    Anesthesia Type: general    Transport from OR: Transported from OR on 6-10 L/min O2 by face mask with adequate spontaneous ventilation    Post pain: adequate analgesia    Post assessment: no apparent anesthetic complications and tolerated procedure well    Post vital signs: stable    Level of consciousness: responds to stimulation    Nausea/Vomiting: no nausea/vomiting    Complications: none    Transfer of care protocol was followed      Last vitals: Visit Vitals  BP (!) 149/66 (BP Location: Right arm, Patient Position: Lying)   Pulse 80   Temp 36.7 °C (98.1 °F) (Oral)   Resp 18   Ht 6' 1" (1.854 m)   Wt 87.1 kg (192 lb)   SpO2 100%   BMI 25.33 kg/m²     "

## 2024-03-04 ENCOUNTER — LAB VISIT (OUTPATIENT)
Dept: LAB | Facility: HOSPITAL | Age: 59
End: 2024-03-04
Attending: SURGERY
Payer: COMMERCIAL

## 2024-03-04 DIAGNOSIS — Z99.2 ARTERIOVENOUS FISTULA FOR HEMODIALYSIS IN PLACE, PRIMARY: ICD-10-CM

## 2024-03-04 DIAGNOSIS — N18.6 ESRD (END STAGE RENAL DISEASE): ICD-10-CM

## 2024-03-04 LAB
ANION GAP SERPL CALC-SCNC: 11 MMOL/L (ref 8–16)
BACTERIA SPEC AEROBE CULT: NORMAL
BASOPHILS # BLD AUTO: 0.04 K/UL (ref 0–0.2)
BASOPHILS NFR BLD: 0.7 % (ref 0–1.9)
BUN SERPL-MCNC: 34 MG/DL (ref 6–20)
CALCIUM SERPL-MCNC: 10.1 MG/DL (ref 8.7–10.5)
CHLORIDE SERPL-SCNC: 95 MMOL/L (ref 95–110)
CO2 SERPL-SCNC: 28 MMOL/L (ref 23–29)
CREAT SERPL-MCNC: 6.1 MG/DL (ref 0.5–1.4)
DIFFERENTIAL METHOD BLD: ABNORMAL
EOSINOPHIL # BLD AUTO: 0.1 K/UL (ref 0–0.5)
EOSINOPHIL NFR BLD: 1.9 % (ref 0–8)
ERYTHROCYTE [DISTWIDTH] IN BLOOD BY AUTOMATED COUNT: 15.3 % (ref 11.5–14.5)
EST. GFR  (NO RACE VARIABLE): 10 ML/MIN/1.73 M^2
FLOW CYTOMETRY ANTIBODIES ANALYZED - LYMPH NODE: NORMAL
FLOW CYTOMETRY ANTIBODIES ANALYZED - TISSUE: NORMAL
FLOW CYTOMETRY COMMENT - LYMPH NODE: NORMAL
FLOW CYTOMETRY COMMENT - TISSUE: NORMAL
FLOW CYTOMETRY INTERPRETATION - LYMPH NODE: NORMAL
FLOW CYTOMETRY INTERPRETATION - TISSUE: NORMAL
GLUCOSE SERPL-MCNC: 127 MG/DL (ref 70–110)
HCT VFR BLD AUTO: 34.2 % (ref 40–54)
HGB BLD-MCNC: 11 G/DL (ref 14–18)
IMM GRANULOCYTES # BLD AUTO: 0.1 K/UL (ref 0–0.04)
IMM GRANULOCYTES NFR BLD AUTO: 1.7 % (ref 0–0.5)
LYMPHOCYTES # BLD AUTO: 0.6 K/UL (ref 1–4.8)
LYMPHOCYTES NFR BLD: 10.9 % (ref 18–48)
MCH RBC QN AUTO: 28.7 PG (ref 27–31)
MCHC RBC AUTO-ENTMCNC: 32.2 G/DL (ref 32–36)
MCV RBC AUTO: 89 FL (ref 82–98)
MONOCYTES # BLD AUTO: 0.6 K/UL (ref 0.3–1)
MONOCYTES NFR BLD: 11.1 % (ref 4–15)
NEUTROPHILS # BLD AUTO: 4.2 K/UL (ref 1.8–7.7)
NEUTROPHILS NFR BLD: 73.7 % (ref 38–73)
NRBC BLD-RTO: 0 /100 WBC
PLATELET # BLD AUTO: 155 K/UL (ref 150–450)
PLATELET BLD QL SMEAR: ABNORMAL
PMV BLD AUTO: 9.2 FL (ref 9.2–12.9)
POTASSIUM SERPL-SCNC: 3.5 MMOL/L (ref 3.5–5.1)
RBC # BLD AUTO: 3.83 M/UL (ref 4.6–6.2)
SODIUM SERPL-SCNC: 134 MMOL/L (ref 136–145)
SPECIMEN TYPE - TISSUE: NORMAL
WBC # BLD AUTO: 5.76 K/UL (ref 3.9–12.7)

## 2024-03-04 PROCEDURE — 36415 COLL VENOUS BLD VENIPUNCTURE: CPT | Performed by: SURGERY

## 2024-03-04 PROCEDURE — 80048 BASIC METABOLIC PNL TOTAL CA: CPT | Performed by: SURGERY

## 2024-03-04 PROCEDURE — 85025 COMPLETE CBC W/AUTO DIFF WBC: CPT | Performed by: SURGERY

## 2024-03-05 ENCOUNTER — PATIENT MESSAGE (OUTPATIENT)
Dept: HEMATOLOGY/ONCOLOGY | Facility: CLINIC | Age: 59
End: 2024-03-05
Payer: COMMERCIAL

## 2024-03-05 ENCOUNTER — TELEPHONE (OUTPATIENT)
Dept: GASTROENTEROLOGY | Facility: CLINIC | Age: 59
End: 2024-03-05
Payer: COMMERCIAL

## 2024-03-05 LAB
ADEQUACY: NORMAL
BACTERIA SPEC ANAEROBE CULT: ABNORMAL
FINAL PATHOLOGIC DIAGNOSIS: NORMAL
Lab: NORMAL

## 2024-03-05 NOTE — TELEPHONE ENCOUNTER
"----- Message from Mary Quiñones sent at 3/5/2024  4:11 PM CST -----  Regarding: missed call  Contact: [phone]  Name Of Caller: self     Contact Preference?:  [phone]     What is the nature of the call?: returning a missed call     Additional Notes:    "Thank you for all that you do for our patients"        "

## 2024-03-06 ENCOUNTER — OFFICE VISIT (OUTPATIENT)
Dept: GASTROENTEROLOGY | Facility: CLINIC | Age: 59
End: 2024-03-06
Payer: COMMERCIAL

## 2024-03-06 ENCOUNTER — HOSPITAL ENCOUNTER (OUTPATIENT)
Dept: PREADMISSION TESTING | Facility: HOSPITAL | Age: 59
Discharge: HOME | End: 2024-03-06
Attending: SURGERY
Payer: COMMERCIAL

## 2024-03-06 ENCOUNTER — TELEPHONE (OUTPATIENT)
Dept: ENDOSCOPY | Facility: HOSPITAL | Age: 59
End: 2024-03-06
Payer: COMMERCIAL

## 2024-03-06 ENCOUNTER — TELEPHONE (OUTPATIENT)
Dept: PULMONOLOGY | Facility: HOSPITAL | Age: 59
End: 2024-03-06
Payer: COMMERCIAL

## 2024-03-06 VITALS
BODY MASS INDEX: 25.41 KG/M2 | DIASTOLIC BLOOD PRESSURE: 74 MMHG | WEIGHT: 198.88 LBS | SYSTOLIC BLOOD PRESSURE: 150 MMHG | HEART RATE: 78 BPM | HEIGHT: 74 IN | BODY MASS INDEX: 25.52 KG/M2 | WEIGHT: 198 LBS | HEIGHT: 74 IN

## 2024-03-06 VITALS
WEIGHT: 198.88 LBS | SYSTOLIC BLOOD PRESSURE: 130 MMHG | DIASTOLIC BLOOD PRESSURE: 68 MMHG | HEART RATE: 93 BPM | TEMPERATURE: 97 F | BODY MASS INDEX: 26.36 KG/M2 | HEIGHT: 73 IN | RESPIRATION RATE: 18 BRPM | OXYGEN SATURATION: 97 %

## 2024-03-06 DIAGNOSIS — Z01.812 PRE-PROCEDURAL LABORATORY EXAMINATION: ICD-10-CM

## 2024-03-06 DIAGNOSIS — Z12.11 ENCOUNTER FOR SCREENING COLONOSCOPY: Primary | ICD-10-CM

## 2024-03-06 DIAGNOSIS — Z99.2 DIALYSIS PATIENT: ICD-10-CM

## 2024-03-06 DIAGNOSIS — R06.6 HICCUPS: ICD-10-CM

## 2024-03-06 DIAGNOSIS — R93.3 ABNORMAL FINDING ON GI TRACT IMAGING: Primary | ICD-10-CM

## 2024-03-06 DIAGNOSIS — E44.0 MODERATE PROTEIN-CALORIE MALNUTRITION: Primary | ICD-10-CM

## 2024-03-06 PROCEDURE — 3008F BODY MASS INDEX DOCD: CPT | Mod: CPTII,S$GLB,, | Performed by: INTERNAL MEDICINE

## 2024-03-06 PROCEDURE — 99999 PR PBB SHADOW E&M-EST. PATIENT-LVL III: CPT | Mod: PBBFAC,,, | Performed by: INTERNAL MEDICINE

## 2024-03-06 PROCEDURE — 3077F SYST BP >= 140 MM HG: CPT | Mod: CPTII,S$GLB,, | Performed by: INTERNAL MEDICINE

## 2024-03-06 PROCEDURE — 99214 OFFICE O/P EST MOD 30 MIN: CPT | Mod: S$GLB,,, | Performed by: INTERNAL MEDICINE

## 2024-03-06 PROCEDURE — 3078F DIAST BP <80 MM HG: CPT | Mod: CPTII,S$GLB,, | Performed by: INTERNAL MEDICINE

## 2024-03-06 RX ORDER — MIRTAZAPINE 15 MG/1
15 TABLET, FILM COATED ORAL NIGHTLY
Qty: 30 TABLET | Refills: 11 | Status: ON HOLD | OUTPATIENT
Start: 2024-03-06 | End: 2024-04-08 | Stop reason: HOSPADM

## 2024-03-06 NOTE — TELEPHONE ENCOUNTER
Spoke to patient to schedule procedure(s) Colonoscopy       Physician to perform procedure(s) Dr. AMANDA Pérez   Date of Procedure (s) 03/25/2024  Arrival Time 7:30 AM  for Lab  Time of Procedure(s) 8:35 Am    Location of Procedure(s) [place] 2nd Floor  Type of Rx Prep sent to patient: PEG  Instructions provided to patient via [facility]    Patient was informed on the following information and verbalized understanding. Screening questionnaire reviewed with patient and complete. If procedure requires anesthesia, a responsible adult needs to be present to accompany the patient home, patient cannot drive after receiving anesthesia. Appointment details are tentative, especially check-in time. Patient will receive a prep-op call 7 days prior to confirm check-in time for procedure. If applicable the patient should contact their pharmacy to verify Rx for procedure prep is ready for pick-up. Patient was advised to call the scheduling department at [phone] if pharmacy states no Rx is available. Patient was advised to call the endoscopy scheduling department if any questions or concerns arise.      [facility]oscopy Scheduling Department

## 2024-03-06 NOTE — TELEPHONE ENCOUNTER
"----- Message from Roopa Pérez MD sent at 3/6/2024  1:49 PM CST -----  Regarding: Colonoscopy 1st available  Procedure: Colonoscopy    Diagnosis: Abnormal finding on GI tract imaging    Procedure Timin-4 weeks    #If within 4 weeks selected, please david as high priority#    #If greater than 12 weeks, please select "5-12 weeks" and delay sending until 3 months prior to requested date#     Provider: Any GI provider    Location: No Preference    Additional Scheduling Information: No scheduling concerns    Prep Specifications:constipation prep    Is the patient taking a GLP-1 Agonist:no    Have you attached a patient to this message: yes      "

## 2024-03-06 NOTE — DISCHARGE INSTRUCTIONS
YOUR PROCEDURE WILL BE AT [facility] at [address] [place] [address]                  Before 7 AM, enter through the Emergency Entrance..   After 7 AM enter through the Main Entrance.                 Report to the Same Day Surgery Registration Desk in the hallway.(Just beside the Same Day Surgery Unit)      Your procedure  is scheduled for __3/14/2024________.    Call [phone] between 2pm and 5pm on __3/13/2024_____to find out your arrival time for the day of surgery.    You may have two visitors.  No children under 12 years old.     You will be going to the Same Day Surgery Unit on the 2nd floor of the hospital.    Important instructions:  Do not eat anything after midnight.  You may have plain water, non carbonated.  You may also have Gatorade or Powerade after midnight.    Stop all fluids 2 hours before your surgery.    It is okay to brush your teeth.  Do not have gum, candy or mints.    SEE MEDICATION SHEET.   TAKE MEDICATIONS AS DIRECTED WITH SIPS OF WATER.      Do not take any diabetic medication on the morning of surgery unless instructed to do so by your doctor or pre op nurse.      All GLP-1 weekly diabetic/weight loss medications must not be taken for one week before your surgery, or your surgery could be canceled.      STOP taking Aspirin, Ibuprofen,  Advil, Motrin, Mobic(meloxicam), Aleve (naproxen), Fish oil, and Vitamin E for at least 7 days before your surgery.     You may take Tylenol if needed which is not a blood thinner.    Please shower the night before and the morning of your surgery.      Follow any Prep Instructions given by your surgeon.    Use Chlorhexidine soap as instructed by your pre op nurse.   Please place clean linens on your bed the night before surgery. Please wear fresh clean clothing after each shower.    No shaving of procedural area at least 4-5 days before surgery due to increased risk of skin irritation and/or possible  infection.    Female patients may be asked for a urine specimen on the morning of the surgery.  Please check with your nurse before using the restroom.    Contact lenses and removable denture work may not be worn during your procedure.    You may wear deodorant only. If you are having breast surgery, do not wear deodorant on the operative side.    Do not wear powder, body lotion, perfume/cologne or make-up.    Do not wear any jewelry or have any metal on your body.    You will be asked to remove any dentures or partials for the procedure.    If you are going home on the same day of surgery, you must arrange for a family member or a friend to drive you home.  Public transportation is prohibited.  You will not be able to drive home if you were given anesthesia or sedation.    Patients who want to have their Post-op prescriptions filled from our in-house [facility], bring a Credit/Debit Card or cash with you. A co-pay may be required.  The pharmacy closes at 5:30 pm.    Wear loose fitting clothes allowing for bandages.    Please leave money and valuables home.      You may bring your cell phone.    Call the doctor if fever or illness should occur before your surgery.    Call [phone] to contact us here if needed.                            CLOTHES ON DAY OF SURGERY    SHOULDER surgery:  you must have a very oversized shirt.  Very, Very large.  You will probably have a large sling on with your arm strapped to your chest.  You will not be able to put the arm of the operated shoulder into a sleeve.  You can put the arm of the un-operated shoulder into the sleeve, but the shirt will need to be draped over the operated shoulder.       ARM or HAND surgery:  make sure that your sleeves are large and loose enough to pass over large dressings or cast.      BREAST or UNDERARM surgery:  wear a loose, button down shirt so that you can dress without raising your arms over your head.    ABDOMINAL surgery:  wear loose,  comfortable clothing.  Nothing tight around the abdomen.  NO JEANS    PENIS or SCROTAL surgery:  loose comfortable clothing.  Large sweat pants, pajama pants or a robe.  ABSOLUTELY NO JEANS      LEG or FOOT surgery:  wear large loose pants that are able to pass over any large dressings or casts.  You could also wear loose shorts or a skirt.

## 2024-03-06 NOTE — ANESTHESIA PREPROCEDURE EVALUATION
03/06/2024  Catalino Mcfadden is a 58 y.o., male scheduled for  REVISION, AV FISTULA (Left) on 3/14/2024.      Past Medical History:   Diagnosis Date    Cancer     Diabetes mellitus, type 2     Diabetic foot ulcer associated with diabetes mellitus due to underlying condition 07/16/2020    ESRD on hemodialysis     Hyperlipidemia     Hypertension     Obese        Past Surgical History:   Procedure Laterality Date    AV FISTULA PLACEMENT Left 07/06/2023    Procedure: CREATION, AV FISTULA;  Surgeon: Daniel Poon MD;  Location: [facility] OR;  Service: Vascular;  Laterality: Left;  RN PREOP 6/28/2023  LABS DAY OF SURGERY    BONE BIOPSY Left 07/17/2020    Procedure: BIOPSY, BONE;  Surgeon: Verona Whitmore DPM;  Location: [facility] OR;  Service: Podiatry;  Laterality: Left;    CERVICAL DISC SURGERY  07/11/2016    DEBRIDEMENT Left 07/17/2020    Procedure: DEBRIDEMENT;  Surgeon: Verona Whitmore DPM;  Location: [facility] OR;  Service: Podiatry;  Laterality: Left;    DEBRIDEMENT OF FOOT Right     toes & plantar surface    ENDOBRONCHIAL ULTRASOUND N/A 3/1/2024    Procedure: ENDOBRONCHIAL ULTRASOUND (EBUS);  Surgeon: Francisco Fleming MD;  Location: [facility] OR 2ND FLR;  Service: Pulmonary;  Laterality: N/A;    ESOPHAGEAL MANOMETRY WITH MEASUREMENT OF IMPEDANCE N/A 03/27/2023    Procedure: MANOMETRY, ESOPHAGUS, WITH IMPEDANCE MEASUREMENT;  Surgeon: Roopa Pérez MD;  Location: [facility] (4TH FLR);  Service: Endoscopy;  Laterality: N/A;  Belching and rumination protocol please  instructions via portal - sm    ESOPHAGOGASTRODUODENOSCOPY N/A 12/16/2022    Procedure: EGD (ESOPHAGOGASTRODUODENOSCOPY);  Surgeon: Eren Francois MD;  Location: [facility];  Service: Endoscopy;  Laterality: N/A;  Pt. on Dialysis. K+ ordered prior to procedure.EC    ESOPHAGOGASTRODUODENOSCOPY N/A 12/5/2023    Procedure: EGD (ESOPHAGOGASTRODUODENOSCOPY);   Surgeon: Kash Johnston MD;  Location: [facility];  Service: Endoscopy;  Laterality: N/A;    FRACTURE SURGERY Right     medial ankle, metal plate present    INJECTION OF ANESTHETIC AGENT AROUND NERVE Right 2/2/2024    Procedure: BLOCK, NERVE STELLATE GANGLION *HOLD ASPIRIN 5 DAYS PRIOR*;  Surgeon: Gokul Gonzalez MD;  Location: [facility] MGT;  Service: Pain Management;  Laterality: Right;  [phone]    INSERTION OF TUNNELED CENTRAL VENOUS CATHETER (CVC) WITH SUBCUTANEOUS PORT N/A 06/11/2021    Procedure: TUNNEL CATH INSERTION WITHOUT PORT;  Surgeon: Jose Manuel Diagnostic Provider;  Location: [facility] OR;  Service: Radiology;  Laterality: N/A;  11AM START---PHONE PREOP 6/10/21---COVID POSTIVE ON 7/2020---NO S/S    left leg orthopedic surgery Left     UPPER GASTROINTESTINAL ENDOSCOPY               Pre-op Assessment    I have reviewed the Patient Summary Reports.     I have reviewed the Nursing Notes. I have reviewed the NPO Status.   I have reviewed the Medications.     Review of Systems  Anesthesia Hx:  No problems with previous Anesthesia             Denies Family Hx of Anesthesia complications.    Denies Personal Hx of Anesthesia complications.                    Social:  Non-Smoker, No Alcohol Use       Hematology/Oncology:  Hematology Normal                                     EENT/Dental:  EENT/Dental Normal           Cardiovascular:  Exercise tolerance: good   Hypertension                Functional Capacity good / => 4 METS                   Hypertension         Pulmonary:  Pulmonary Normal                       Renal/:  Chronic Renal Disease, ESRD   TTHS     Kidney Function/Disease             Hepatic/GI:      Liver Disease,         Liver Disease        Musculoskeletal:  Arthritis        Arthritis          Neurological:  Neurology Normal              Arthritis                           Endocrine:  Diabetes, type 2    Diabetes                          Physical Exam  General: Well nourished, Cooperative and  Alert    Airway:  Mallampati: I   Mouth Opening: Normal  TM Distance: Normal  Tongue: Normal  Neck ROM: Normal ROM    Dental:  Intact    Heart:  Rate: Normal        Anesthesia Plan  Type of Anesthesia, risks & benefits discussed:    Anesthesia Type: Regional  Intra-op Monitoring Plan: Standard ASA Monitors  Induction:  IV  Informed Consent: Informed consent signed with the Patient and all parties understand the risks and agree with anesthesia plan.  All questions answered.   ASA Score: 3    Ready For Surgery From Anesthesia Perspective.     .

## 2024-03-06 NOTE — PROGRESS NOTES
"[facility]    CC: hiccups    HPI 58 y.o. male with ESRD on HD, DM2, Gastroparesis, imaging concerning for metastatic disease who presents for follow up of chronic, daily, bothersome hiccups with associated dry heaves.  He has tried and failed Protonix, omeprazole, Nexium, Zofran, Phenergan, baclofen, gabapentin, reglan, thorazine, Imipramine at bedtime, bethanechol 40 mg before meals t.I.d. (for gastroparesis - caused drooling), TCA with elavil 10 mg (for gastroparesis), various home remedies, gastroparesis diet and diaphragmatic breathing.     Eating or talking can trigger his hiccups.  Drinking ginger ale helps but he has to be careful with his fluid status.    His previous PET scan showed a colon abnormality, he has never had a colonoscopy.    He has mild constipation.    He has had significant weight loss over the past year.    The patient is homeless.  [facility]  He is considering moving home to [place] to be close to family.    Past Medical History  Past Medical History:   Diagnosis Date    Cancer     Diabetes mellitus, type 2     Diabetic foot ulcer associated with diabetes mellitus due to underlying condition 07/16/2020    ESRD on hemodialysis     Hyperlipidemia     Hypertension     Obese          Physical Examination  BP (!) 150/74   Pulse 78   Ht 6' 1.5" (1.867 m)   Wt 90.2 kg (198 lb 13.7 oz)   BMI 25.88 kg/m²   General appearance: alert, cooperative, no distress  HENT: Normocephalic, atraumatic, neck symmetrical, no nasal discharge   Abdomen: soft, non-tender; non distended, no rebound or guarding  Neurologic: Alert and oriented X 3, moving all four extremities, intact sensation to light touch    Labs:  Lab Results   Component Value Date    WBC 5.76 03/04/2024    HGB 11.0 (L) 03/04/2024    HCT 34.2 (L) 03/04/2024    MCV 89 03/04/2024     03/04/2024         CMP  Sodium   Date Value Ref Range Status   03/04/2024 134 (L) 136 - 145 mmol/L " Final     Potassium   Date Value Ref Range Status   03/04/2024 3.5 3.5 - 5.1 mmol/L Final     Chloride   Date Value Ref Range Status   03/04/2024 95 95 - 110 mmol/L Final     CO2   Date Value Ref Range Status   03/04/2024 28 23 - 29 mmol/L Final     Glucose   Date Value Ref Range Status   03/04/2024 127 (H) 70 - 110 mg/dL Final     BUN   Date Value Ref Range Status   03/04/2024 34 (H) 6 - 20 mg/dL Final     Creatinine   Date Value Ref Range Status   03/04/2024 6.1 (H) 0.5 - 1.4 mg/dL Final     Calcium   Date Value Ref Range Status   03/04/2024 10.1 8.7 - 10.5 mg/dL Final     Total Protein   Date Value Ref Range Status   02/26/2024 6.7 6.0 - 8.4 g/dL Final     Albumin   Date Value Ref Range Status   02/26/2024 2.6 (L) 3.5 - 5.2 g/dL Final     Total Bilirubin   Date Value Ref Range Status   02/26/2024 1.2 (H) 0.1 - 1.0 mg/dL Final     Comment:     For infants and newborns, interpretation of results should be based  on gestational age, weight and in agreement with clinical  observations.    Premature Infant recommended reference ranges:  Up to 24 hours.............<8.0 mg/dL  Up to 48 hours............<12.0 mg/dL  3-5 days..................<15.0 mg/dL  6-29 days.................<15.0 mg/dL       Alkaline Phosphatase   Date Value Ref Range Status   02/26/2024 647 (H) 55 - 135 U/L Final     AST   Date Value Ref Range Status   02/26/2024 43 (H) 10 - 40 U/L Final     ALT   Date Value Ref Range Status   02/26/2024 26 10 - 44 U/L Final     Anion Gap   Date Value Ref Range Status   03/04/2024 11 8 - 16 mmol/L Final     eGFR   Date Value Ref Range Status   03/04/2024 10 (A) >60 mL/min/1.73 m^2 Final         Assessment:   1. Moderate protein-calorie malnutrition    2. Hiccups    3.      Constipation  4.      Abnormal Imaging of the colon on PET scan  5.      Imaging concerning for metastatic disease  6.      ESRD on HD    Plan:  -Start Remeron 15mg nightly for hiccups.  -Schedule colonoscopy.  - Start Miralax for  constipation.    Roopa Pérez MD

## 2024-03-06 NOTE — TELEPHONE ENCOUNTER
----- Message from Francisco Fleming MD sent at 3/6/2024  9:15 AM CST -----  Pathology back.. Negative for malignancy.  Bone biopsy with IR still pending.     Thanks for calling.     DV

## 2024-03-13 ENCOUNTER — TELEPHONE (OUTPATIENT)
Dept: SURGERY | Facility: HOSPITAL | Age: 59
End: 2024-03-13
Payer: COMMERCIAL

## 2024-03-14 ENCOUNTER — HOSPITAL ENCOUNTER (OUTPATIENT)
Facility: HOSPITAL | Age: 59
Discharge: HOME | End: 2024-03-14
Attending: SURGERY | Admitting: SURGERY
Payer: COMMERCIAL

## 2024-03-14 ENCOUNTER — TELEPHONE (OUTPATIENT)
Dept: HEMATOLOGY/ONCOLOGY | Facility: CLINIC | Age: 59
End: 2024-03-14
Payer: COMMERCIAL

## 2024-03-14 VITALS
BODY MASS INDEX: 25.77 KG/M2 | DIASTOLIC BLOOD PRESSURE: 70 MMHG | SYSTOLIC BLOOD PRESSURE: 148 MMHG | HEART RATE: 67 BPM | WEIGHT: 198 LBS | OXYGEN SATURATION: 98 % | TEMPERATURE: 98 F | RESPIRATION RATE: 16 BRPM

## 2024-03-14 DIAGNOSIS — T82.858D ARTERIOVENOUS FISTULA STENOSIS, SUBSEQUENT ENCOUNTER: ICD-10-CM

## 2024-03-14 LAB
ANION GAP SERPL CALC-SCNC: 13 MMOL/L (ref 8–16)
BUN SERPL-MCNC: 28 MG/DL (ref 6–20)
CALCIUM SERPL-MCNC: 9.6 MG/DL (ref 8.7–10.5)
CHLORIDE SERPL-SCNC: 94 MMOL/L (ref 95–110)
CO2 SERPL-SCNC: 27 MMOL/L (ref 23–29)
CREAT SERPL-MCNC: 6 MG/DL (ref 0.5–1.4)
ERYTHROCYTE [DISTWIDTH] IN BLOOD BY AUTOMATED COUNT: 15.6 % (ref 11.5–14.5)
EST. GFR  (NO RACE VARIABLE): 10 ML/MIN/1.73 M^2
GLUCOSE SERPL-MCNC: 101 MG/DL (ref 70–110)
HCT VFR BLD AUTO: 34.8 % (ref 40–54)
HGB BLD-MCNC: 11.4 G/DL (ref 14–18)
MCH RBC QN AUTO: 29.6 PG (ref 27–31)
MCHC RBC AUTO-ENTMCNC: 32.8 G/DL (ref 32–36)
MCV RBC AUTO: 90 FL (ref 82–98)
PLATELET # BLD AUTO: 151 K/UL (ref 150–450)
PMV BLD AUTO: 10.2 FL (ref 9.2–12.9)
POTASSIUM SERPL-SCNC: 3.6 MMOL/L (ref 3.5–5.1)
RBC # BLD AUTO: 3.85 M/UL (ref 4.6–6.2)
SODIUM SERPL-SCNC: 134 MMOL/L (ref 136–145)
WBC # BLD AUTO: 4.14 K/UL (ref 3.9–12.7)

## 2024-03-14 PROCEDURE — 71000039 HC RECOVERY, EACH ADD'L HOUR: Performed by: SURGERY

## 2024-03-14 PROCEDURE — 85027 COMPLETE CBC AUTOMATED: CPT | Performed by: NURSE PRACTITIONER

## 2024-03-14 PROCEDURE — 36832 AV FISTULA REVISION OPEN: CPT | Mod: AS,,, | Performed by: NURSE PRACTITIONER

## 2024-03-14 PROCEDURE — 63600175 PHARM REV CODE 636 W HCPCS: Performed by: ANESTHESIOLOGY

## 2024-03-14 PROCEDURE — 36832 AV FISTULA REVISION OPEN: CPT | Mod: ,,, | Performed by: SURGERY

## 2024-03-14 PROCEDURE — D9220A PRA ANESTHESIA: Mod: ANES,,, | Performed by: ANESTHESIOLOGY

## 2024-03-14 PROCEDURE — 71000033 HC RECOVERY, INTIAL HOUR: Performed by: SURGERY

## 2024-03-14 PROCEDURE — 63600175 PHARM REV CODE 636 W HCPCS: Performed by: SURGERY

## 2024-03-14 PROCEDURE — 37000009 HC ANESTHESIA EA ADD 15 MINS: Performed by: SURGERY

## 2024-03-14 PROCEDURE — 71000015 HC POSTOP RECOV 1ST HR: Performed by: SURGERY

## 2024-03-14 PROCEDURE — 37000008 HC ANESTHESIA 1ST 15 MINUTES: Performed by: SURGERY

## 2024-03-14 PROCEDURE — 63600175 PHARM REV CODE 636 W HCPCS: Performed by: STUDENT IN AN ORGANIZED HEALTH CARE EDUCATION/TRAINING PROGRAM

## 2024-03-14 PROCEDURE — 25000003 PHARM REV CODE 250: Performed by: NURSE PRACTITIONER

## 2024-03-14 PROCEDURE — 36000706: Performed by: SURGERY

## 2024-03-14 PROCEDURE — 25000003 PHARM REV CODE 250: Performed by: SURGERY

## 2024-03-14 PROCEDURE — 25000003 PHARM REV CODE 250: Performed by: STUDENT IN AN ORGANIZED HEALTH CARE EDUCATION/TRAINING PROGRAM

## 2024-03-14 PROCEDURE — 36415 COLL VENOUS BLD VENIPUNCTURE: CPT | Performed by: NURSE PRACTITIONER

## 2024-03-14 PROCEDURE — 80048 BASIC METABOLIC PNL TOTAL CA: CPT | Performed by: NURSE PRACTITIONER

## 2024-03-14 PROCEDURE — 63600175 PHARM REV CODE 636 W HCPCS: Performed by: NURSE PRACTITIONER

## 2024-03-14 PROCEDURE — 71000016 HC POSTOP RECOV ADDL HR: Performed by: SURGERY

## 2024-03-14 PROCEDURE — 36000707: Performed by: SURGERY

## 2024-03-14 PROCEDURE — D9220A PRA ANESTHESIA: Mod: CRNA,,, | Performed by: STUDENT IN AN ORGANIZED HEALTH CARE EDUCATION/TRAINING PROGRAM

## 2024-03-14 RX ORDER — ROPIVACAINE HYDROCHLORIDE 5 MG/ML
INJECTION, SOLUTION EPIDURAL; INFILTRATION; PERINEURAL
Status: COMPLETED | OUTPATIENT
Start: 2024-03-14 | End: 2024-03-14

## 2024-03-14 RX ORDER — ONDANSETRON HYDROCHLORIDE 2 MG/ML
INJECTION, SOLUTION INTRAVENOUS
Status: DISCONTINUED | OUTPATIENT
Start: 2024-03-14 | End: 2024-03-14

## 2024-03-14 RX ORDER — PROPOFOL 10 MG/ML
VIAL (ML) INTRAVENOUS CONTINUOUS PRN
Status: DISCONTINUED | OUTPATIENT
Start: 2024-03-14 | End: 2024-03-14

## 2024-03-14 RX ORDER — ACETAMINOPHEN 500 MG
1000 TABLET ORAL
Status: DISCONTINUED | OUTPATIENT
Start: 2024-03-14 | End: 2024-03-14 | Stop reason: HOSPADM

## 2024-03-14 RX ORDER — LIDOCAINE HYDROCHLORIDE 10 MG/ML
INJECTION, SOLUTION EPIDURAL; INFILTRATION; INTRACAUDAL; PERINEURAL
Status: DISCONTINUED | OUTPATIENT
Start: 2024-03-14 | End: 2024-03-14 | Stop reason: HOSPADM

## 2024-03-14 RX ORDER — VANCOMYCIN HYDROCHLORIDE 1 G/20ML
INJECTION, POWDER, LYOPHILIZED, FOR SOLUTION INTRAVENOUS
Status: DISCONTINUED | OUTPATIENT
Start: 2024-03-14 | End: 2024-03-14 | Stop reason: HOSPADM

## 2024-03-14 RX ORDER — OXYCODONE AND ACETAMINOPHEN 5; 325 MG/1; MG/1
1 TABLET ORAL EVERY 8 HOURS PRN
Qty: 20 TABLET | Refills: 0 | Status: ON HOLD | OUTPATIENT
Start: 2024-03-14 | End: 2024-04-17 | Stop reason: HOSPADM

## 2024-03-14 RX ORDER — MIDAZOLAM HYDROCHLORIDE 1 MG/ML
INJECTION INTRAMUSCULAR; INTRAVENOUS
Status: DISCONTINUED | OUTPATIENT
Start: 2024-03-14 | End: 2024-03-14

## 2024-03-14 RX ORDER — SODIUM CHLORIDE 0.9 % (FLUSH) 0.9 %
10 SYRINGE (ML) INJECTION
Status: DISCONTINUED | OUTPATIENT
Start: 2024-03-14 | End: 2024-03-14 | Stop reason: HOSPADM

## 2024-03-14 RX ORDER — HEPARIN SODIUM 1000 [USP'U]/ML
INJECTION, SOLUTION INTRAVENOUS; SUBCUTANEOUS
Status: DISCONTINUED | OUTPATIENT
Start: 2024-03-14 | End: 2024-03-14 | Stop reason: HOSPADM

## 2024-03-14 RX ORDER — SODIUM CHLORIDE, SODIUM LACTATE, POTASSIUM CHLORIDE, CALCIUM CHLORIDE 600; 310; 30; 20 MG/100ML; MG/100ML; MG/100ML; MG/100ML
INJECTION, SOLUTION INTRAVENOUS CONTINUOUS
Status: DISCONTINUED | OUTPATIENT
Start: 2024-03-14 | End: 2024-03-14 | Stop reason: HOSPADM

## 2024-03-14 RX ORDER — LIDOCAINE HYDROCHLORIDE 10 MG/ML
1 INJECTION, SOLUTION EPIDURAL; INFILTRATION; INTRACAUDAL; PERINEURAL ONCE
Status: DISCONTINUED | OUTPATIENT
Start: 2024-03-14 | End: 2024-03-14 | Stop reason: HOSPADM

## 2024-03-14 RX ORDER — DEXMEDETOMIDINE HYDROCHLORIDE 100 UG/ML
INJECTION, SOLUTION INTRAVENOUS
Status: DISCONTINUED | OUTPATIENT
Start: 2024-03-14 | End: 2024-03-14

## 2024-03-14 RX ORDER — LIDOCAINE HYDROCHLORIDE 20 MG/ML
INJECTION INTRAVENOUS
Status: DISCONTINUED | OUTPATIENT
Start: 2024-03-14 | End: 2024-03-14

## 2024-03-14 RX ORDER — FENTANYL CITRATE 50 UG/ML
INJECTION, SOLUTION INTRAMUSCULAR; INTRAVENOUS
Status: DISCONTINUED | OUTPATIENT
Start: 2024-03-14 | End: 2024-03-14

## 2024-03-14 RX ADMIN — SODIUM CHLORIDE: 0.9 INJECTION, SOLUTION INTRAVENOUS at 07:03

## 2024-03-14 RX ADMIN — ROPIVACAINE HYDROCHLORIDE 20 ML: 5 INJECTION, SOLUTION EPIDURAL; INFILTRATION; PERINEURAL at 07:03

## 2024-03-14 RX ADMIN — GLYCOPYRROLATE 0.1 MG: 0.2 INJECTION, SOLUTION INTRAMUSCULAR; INTRAVITREAL at 08:03

## 2024-03-14 RX ADMIN — DEXMEDETOMIDINE HYDROCHLORIDE 4 MCG: 100 INJECTION, SOLUTION INTRAVENOUS at 08:03

## 2024-03-14 RX ADMIN — FENTANYL CITRATE 25 MCG: 50 INJECTION, SOLUTION INTRAMUSCULAR; INTRAVENOUS at 08:03

## 2024-03-14 RX ADMIN — DEXMEDETOMIDINE HYDROCHLORIDE 8 MCG: 100 INJECTION, SOLUTION INTRAVENOUS at 08:03

## 2024-03-14 RX ADMIN — CEFAZOLIN 2 G: 2 INJECTION, POWDER, FOR SOLUTION INTRAMUSCULAR; INTRAVENOUS at 07:03

## 2024-03-14 RX ADMIN — GLYCOPYRROLATE 0.1 MG: 0.2 INJECTION, SOLUTION INTRAMUSCULAR; INTRAVITREAL at 07:03

## 2024-03-14 RX ADMIN — MIDAZOLAM HYDROCHLORIDE 1 MG: 1 INJECTION INTRAMUSCULAR; INTRAVENOUS at 08:03

## 2024-03-14 RX ADMIN — MIDAZOLAM HYDROCHLORIDE 1 MG: 1 INJECTION INTRAMUSCULAR; INTRAVENOUS at 07:03

## 2024-03-14 RX ADMIN — PROPOFOL 50 MCG/KG/MIN: 10 INJECTION, EMULSION INTRAVENOUS at 07:03

## 2024-03-14 RX ADMIN — ONDANSETRON 4 MG: 2 INJECTION, SOLUTION INTRAMUSCULAR; INTRAVENOUS at 09:03

## 2024-03-14 RX ADMIN — LIDOCAINE HYDROCHLORIDE 50 MG: 20 INJECTION, SOLUTION INTRAVENOUS at 07:03

## 2024-03-14 NOTE — PROGRESS NOTES
Certification of Assistant at Surgery       Surgery Date: 3/14/2024     Participating Surgeons:  Surgeon(s) and Role:     * Daniel Poon MD - Primary      * Donnell Richards NP-FA  Procedures:  Procedure(s) (LRB):  REVISION, AV FISTULA (Left)    Assistant Surgeon's Certification of Necessity:  I understand that section 1842 (b) (6) (d) of the Social Security Act generally prohibits Medicare Part B reasonable charge payment for the services of assistants at surgery in teaching hospitals when qualified residents are available to furnish such services. I certify that the services for which payment is claimed were medically necessary, and that no qualified resident was available to perform the services. I further understand that these services are subject to post-payment review by the Medicare carrier.      Donnell Richards NP    03/14/2024  10:15 AM

## 2024-03-14 NOTE — TRANSFER OF CARE
Anesthesia Transfer of Care Note    Patient: Catalino Mcfadden    Procedure(s) Performed: Procedure(s) (LRB):  REVISION, AV FISTULA (Left)    Patient location: PACU    Anesthesia Type: regional and general    Transport from OR: Transported from OR on 2-3 L/min O2 by NC with adequate spontaneous ventilation    Post pain: adequate analgesia    Post assessment: no apparent anesthetic complications    Post vital signs: stable    Level of consciousness: responds to stimulation    Nausea/Vomiting: no nausea/vomiting    Complications: none    Transfer of care protocol was followed      Last vitals: Visit Vitals  /62 (BP Location: Right arm, Patient Position: Lying)   Pulse 65   Temp 36.5 °C (97.7 °F) (Temporal)   Resp 12   Wt 89.8 kg (198 lb)   SpO2 99%   BMI 25.77 kg/m²

## 2024-03-14 NOTE — ANESTHESIA PROCEDURE NOTES
Peripheral Block    Patient location during procedure: pre-op    Reason for block: primary anesthetic    Diagnosis: ESRD   Start time: 3/14/2024 7:30 AM  Timeout: 3/14/2024 7:26 AM   End time: 3/14/2024 7:34 AM    Staffing  Authorizing Provider: Nini Alvarenga MD  Performing Provider: Nini Alvarenga MD    Staffing  Performed by: Nini Alvarenga MD  Authorized by: Nini Alvarenga MD    Preanesthetic Checklist  Completed: patient identified, IV checked, site marked, risks and benefits discussed, surgical consent, monitors and equipment checked, pre-op evaluation and timeout performed  Peripheral Block  Patient position: sitting  Prep: ChloraPrep  Patient monitoring: heart rate, cardiac monitor, continuous pulse ox and frequent blood pressure checks  Block type: supraclavicular  Laterality: left  Injection technique: continuous  Needle  Needle type: Sandie   Needle gauge: 22 G  Needle length: 4 in  Needle localization: ultrasound guidance     Assessment  Injection assessment: negative aspiration, negative parasthesia and local visualized surrounding nerve  Paresthesia pain: none  Heart rate change: no  Pain Tolerance: comfortable throughout block and no complaints  Medications:    Medications: ropivacaine (NAROPIN) injection 0.5% - Perineural   20 mL - 3/14/2024 7:30:00 AM

## 2024-03-14 NOTE — DISCHARGE INSTRUCTIONS
"DIET: You may resume your home diet. If nausea is present, increase your diet gradually with fluids and bland foods    ACTIVITY LEVEL: You have received sedation or an anesthetic, you may feel sleepy for several hours. Rest until you are more awake.       DRESSING: Remove dressing in 48 hours, and you may shower. Clean area with soap and water, apply band aid for the next 5 days. Wear sling loosely for support until the anesthesia block has worn off, then discontinue sling once full sensation to your arm has returned.      No driving, alcoholic beverages or signing legal documents for next 24 hours or while taking pain medication.             CALL THE DOCTOR:   Fever of 100.4 degrees Fahrenheit or higher, or as directed by your healthcare provider.  Red, hot, or swollen area around the site.   Loss of pulsing feeling ("thrill") over the fistula  Pain, cold, or numbness in the hand      Fall Prevention  Millions of people fall every year and injure themselves. You may have had anesthesia or sedation which may increase your risk of falling. You may have health issues that put you at an increased risk of falling.     Here are ways to reduce your risk of falling.    Make your home safe by keeping walkways clear of objects you may trip over.  Use non-slip pads under rugs. Do not use area rugs or small throw rugs.  Use non-slip mats in bathtubs and showers.  Install handrails and lights on staircases.  Do not walk in poorly lit areas.  Do not stand on chairs or wobbly ladders.  Use caution when reaching overhead or looking upward. This position can cause a loss of balance.  Be sure your shoes fit properly, have non-slip bottoms and are in good condition.   Wear shoes both inside and out. Avoid going barefoot or wearing slippers.  Be cautious when going up and down stairs, curbs, and when walking on uneven sidewalks.  If your balance is poor, consider using a cane or walker.  If your fall was related to alcohol use, stop " or limit alcohol intake.   If your fall was related to use of sleeping medicines, talk to your doctor about this. You may need to reduce your dosage at bedtime if you awaken during the night to go to the bathroom.    To reduce the need for nighttime bathroom trips:  Avoid drinking fluids for several hours before going to bed  Empty your bladder before going to bed  Men can keep a urinal at the bedside  Stay as active as you can. Balance, flexibility, strength, and endurance all come from exercise. They all play a role in preventing falls. Ask your healthcare provider which types of activity are right for you.  Get your vision checked on a regular basis.  If you have pets, know where they are before you stand up or walk so you don't trip over them.  Use night lights.

## 2024-03-14 NOTE — OP NOTE
Operative Note        Surgery Date: 03/14/2024     Surgeon and Role:     * Daniel Poon MD - Primary     Assistant:   SWATHI Pedroza ST Jeannie Nguyen, PA-S     Pre-op Diagnosis:       ESRD (end stage renal disease) [585.6]     Post-op Diagnosis:   ESRD     Procedure:                 Left upper extremity second stage basilic vein transposition                                        Anesthesia:                Regional     Findings/Key Components: Adequate vein for BVT. Palpable radial pulse.     Estimated Blood Loss: 10mL     INDICATIONS FOR PROCEDURE: Patient is a 71 y.o. male with end-stage renal disease, currently on dialysis through a [facility], in need of an permanent access.      PROCEDURE IN DETAIL: After voicing approval of the procedure to be performed,   the patient was brought back to the Operative Theater and placed in the supine   position. Anesthesia was in charge of providing a regional block. Once this   was completed, the arm was prepped and draped in a sterile surgical fashion.   Preoperative ultrasound revealed adequate veins for a basilic vein   transposition.      OPERATIVE FINDINGS: Incision was made after the block was tested just over the   fistula, dissection down to the basilic vein and the basilic vein was   then followed up the medial aspect of the biceps and all branches were tied with   silk and divided to allow for proper transposition of the vein. Once hemostasis was   adequately performed with electrocautery and silk ligatures, the subcutaneous   tissue was made to elevate the basilic vein just underneath the skin using a 2-0   Vicryl stitch. The skin was then closed using 4-0 Monocryl suture just over the vein. Palpable radial pulse was noted at the end of the case and a palpable thrill in the newly created fistula. The patient tolerated the procedure well. Counts were correct at the end of case.     The patient was transferred back to the Recovery Room in  stable condition with plans to follow up in 4 weeks with an ultrasound.

## 2024-03-14 NOTE — BRIEF OP NOTE
[facility] - Surgery  Brief Operative Note    Surgery Date: 3/14/2024     Surgeon(s) and Role:     * Daniel Poon MD - Primary     * Donnell Richards NP-FA    Assisting Surgeon: None    Pre-op Diagnosis:  Arteriovenous fistula for hemodialysis in place, primary [Z99.2]  ESRD (end stage renal disease) [N18.6]    Post-op Diagnosis:  Post-Op Diagnosis Codes:     * Arteriovenous fistula for hemodialysis in place, primary [Z99.2]     * ESRD (end stage renal disease) [N18.6]    Procedure(s) (LRB):  REVISION, AV FISTULA (Left)    Anesthesia: Regional    Operative Findings: 2nd stage superficialization, skin closed over fistula.    Estimated Blood Loss: * No values recorded between 3/14/2024  8:06 AM and 3/14/2024 10:05 AM *         Specimens:   Specimen (24h ago, onward)      None              Discharge Note    OUTCOME: Patient tolerated treatment/procedure well without complication and is now ready for discharge.    DISPOSITION: Home or Self Care    FINAL DIAGNOSIS:  <principal problem not specified>    FOLLOWUP: In clinic    DISCHARGE INSTRUCTIONS:    Discharge Procedure Orders   Remove dressing in 48 hours     CV US Hemodialysis Access   Standing Status: Future Standing Exp. Date: 03/14/25     Order Specific Question Answer Comments   Release to patient Immediate      Activity as tolerated

## 2024-03-14 NOTE — OR NURSING
0788 - Time out done for Block X2 per Dr Alvarenga  Pt on monitors  0736 - Block complete - pt tolerated well

## 2024-03-15 ENCOUNTER — ANESTHESIA (OUTPATIENT)
Dept: SURGERY | Facility: HOSPITAL | Age: 59
End: 2024-03-15
Payer: COMMERCIAL

## 2024-03-15 ENCOUNTER — OFFICE VISIT (OUTPATIENT)
Dept: HEMATOLOGY/ONCOLOGY | Facility: CLINIC | Age: 59
End: 2024-03-15
Payer: COMMERCIAL

## 2024-03-15 DIAGNOSIS — C78.00 MALIGNANT NEOPLASM METASTATIC TO LUNG, UNSPECIFIED LATERALITY: ICD-10-CM

## 2024-03-15 DIAGNOSIS — I10 ESSENTIAL HYPERTENSION: Chronic | ICD-10-CM

## 2024-03-15 DIAGNOSIS — R91.8 ABNORMAL FINDINGS ON DIAGNOSTIC IMAGING OF LUNG: Primary | ICD-10-CM

## 2024-03-15 DIAGNOSIS — M89.9 LYTIC BONE LESIONS ON XRAY: ICD-10-CM

## 2024-03-15 DIAGNOSIS — C78.7 METASTASIS TO LIVER: ICD-10-CM

## 2024-03-15 PROCEDURE — 99215 OFFICE O/P EST HI 40 MIN: CPT | Mod: 95,,, | Performed by: INTERNAL MEDICINE

## 2024-03-15 NOTE — PROGRESS NOTES
Subjective:    The patient location is:  home  Visit type: Virtual visit with synchronous audio and video  Face-to-face or time spent with patient on the encounter:30 min   Total time spent on and for  this encounter which includes non face-to-face time preparing to see patient, review of tests, obtaining and or reviewing separately obtained records documenting clinical information in the electronic or other health records, independently interpreting results which is not separately reported ,and communicating results to the patient/family/caregiver and in care coordination and treatment planning/communicating with pharmacy for prescriptions/addressing social needs/arranging follow-up and or referrals :45 min    Each patient I provide medical services by telemedicine is:  (1) informed of the relationship between the physician and patient and the respective role of any other health care provider with respect to management of the patient; and (2) notified that he or she may decline to receive medical services by telemedicine and may withdraw from such care at any time.  This is a video visit therefore some elements of the physical exam such as vital signs, heart sounds are breath sounds are not included and may be included if found in recent clinic notes of other providers assessing same patient. Any symptoms or signs that were visualized were stated by the patient may be included in this note.    Patient ID: Catalino Mcfadden is a 58 y.o. male.    Chief Complaint:     HPI    Patient admitted to the hospital with inability to lift his legs.  History of diabetes gastroparesis end-stage renal disease on dialysis admitted with hypertensive crisis MRI showed possible metastatic lesion. Pt had EBUS done and is negative for malignancy on lseions seen. Had IR guided bone bx this result is pending. Pt is on dialysis for ESRD, and [place] [facility] [place]    Impression:     1. Multiple scattered osseous lesions  throughout the visualized lumbosacral spine and iliac bones, nonspecific, but given findings on comparison chest CT from 03/22/2023 are concerning for metastatic disease.  Further evaluation recommended.  2. Mild multilevel degenerative change of the lumbar spine, as detailed above, most pronounced at L3-4 through L5-S1 and resulting in mild neural foraminal stenosis.  No significant spinal canal stenosis at any lumbar level.  This report was flagged in Epic as abnormal.     Review of Systems     Has gained weight since hospital stay  Feels well overall. [facility] better than at hospital   much better from the  generalized weakness .intractable hiccups+  Denies fatigue over above what is normally experienced with day-to-day activities  Denies fever, chills, rigors  Denies issues with ambulation  Denies generalized swelling or new lumps and bumps felt in any part  of body  Denies visual or hearing loss  Denies issues with congestion, sinus issues, cough, sputum production runny nose or itching eyes  Denies chest pain or palpitations, or passing out  Denies abdominal pain, reflux symptoms, nausea vomiting loose stools or constipation  Denies seizure activity or focal weaknesses or symptoms related to TIA, no head aches or blurred vision reported  Denies issues with skin rash or bruising  Denies issues with swelling of feet, tingling or numbness   No issues with sleep,   No recent foreign travel   Good family support reported           Past Medical History:   Diagnosis Date    Cancer     Diabetes mellitus, type 2     Diabetic foot ulcer associated with diabetes mellitus due to underlying condition 07/16/2020    ESRD on hemodialysis     Hyperlipidemia     Hypertension     Obese      Past Surgical History:   Procedure Laterality Date    AV FISTULA PLACEMENT Left 07/06/2023    Procedure: CREATION, AV FISTULA;  Surgeon: Daniel Poon MD;  Location: [facility] OR;  Service: Vascular;  Laterality: Left;  RN PREOP 6/28/2023  LABS  DAY OF SURGERY    BONE BIOPSY Left 07/17/2020    Procedure: BIOPSY, BONE;  Surgeon: Verona Whitmore DPM;  Location: [facility] OR;  Service: Podiatry;  Laterality: Left;    CERVICAL DISC SURGERY  07/11/2016    DEBRIDEMENT Left 07/17/2020    Procedure: DEBRIDEMENT;  Surgeon: Verona Whitmore DPM;  Location: [facility] OR;  Service: Podiatry;  Laterality: Left;    DEBRIDEMENT OF FOOT Right     toes & plantar surface    ENDOBRONCHIAL ULTRASOUND N/A 3/1/2024    Procedure: ENDOBRONCHIAL ULTRASOUND (EBUS);  Surgeon: Francisco Fleming MD;  Location: [facility] 2ND FLR;  Service: Pulmonary;  Laterality: N/A;    ESOPHAGEAL MANOMETRY WITH MEASUREMENT OF IMPEDANCE N/A 03/27/2023    Procedure: MANOMETRY, ESOPHAGUS, WITH IMPEDANCE MEASUREMENT;  Surgeon: Roopa Pérez MD;  Location: [facility] (4TH FLR);  Service: Endoscopy;  Laterality: N/A;  Belching and rumination protocol please  instructions via portal - sm    ESOPHAGOGASTRODUODENOSCOPY N/A 12/16/2022    Procedure: EGD (ESOPHAGOGASTRODUODENOSCOPY);  Surgeon: Eren Francois MD;  Location: [facility];  Service: Endoscopy;  Laterality: N/A;  Pt. on Dialysis. K+ ordered prior to procedure.EC    ESOPHAGOGASTRODUODENOSCOPY N/A 12/5/2023    Procedure: EGD (ESOPHAGOGASTRODUODENOSCOPY);  Surgeon: Kash Johnston MD;  Location: [facility];  Service: Endoscopy;  Laterality: N/A;    FRACTURE SURGERY Right     medial ankle, metal plate present    INJECTION OF ANESTHETIC AGENT AROUND NERVE Right 2/2/2024    Procedure: BLOCK, NERVE STELLATE GANGLION *HOLD ASPIRIN 5 DAYS PRIOR*;  Surgeon: Gokul Gonzalez MD;  Location: [facility] PAIN MGT;  Service: Pain Management;  Laterality: Right;  [phone]    INSERTION OF TUNNELED CENTRAL VENOUS CATHETER (CVC) WITH SUBCUTANEOUS PORT N/A 06/11/2021    Procedure: TUNNEL CATH INSERTION WITHOUT PORT;  Surgeon: Dosc Diagnostic Provider;  Location: [facility] OR;  Service: Radiology;  Laterality: N/A;  11AM START---PHONE PREOP 6/10/21---COVID POSTIVE ON 7/2020---NO  S/S    left leg orthopedic surgery Left     REVISION OF ARTERIOVENOUS FISTULA Left 3/14/2024    Procedure: REVISION, AV FISTULA;  Surgeon: Daniel Poon MD;  Location: [facility];  Service: Vascular;  Laterality: Left;  RN preop 3/6/2024----NEED ORDERS    UPPER GASTROINTESTINAL ENDOSCOPY       Family History   Adopted: Yes   Problem Relation Age of Onset    Heart disease Father     Colon cancer Neg Hx     Esophageal cancer Neg Hx     Colon polyps Neg Hx     Stomach cancer Neg Hx       Social History     Socioeconomic History    Marital status: Other    Number of children: 1   Tobacco Use    Smoking status: Never    Smokeless tobacco: Never   Substance and Sexual Activity    Alcohol use: Not Currently     Comment: occ rare beer    Drug use: No    Sexual activity: Not Currently   Social History Narrative    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     Social Determinants of Health     Financial Resource Strain: Patient Declined (2/21/2024)    Overall Financial Resource Strain (CARDIA)     Difficulty of Paying Living Expenses: Patient declined   Food Insecurity: Unknown (2/21/2024)    Hunger Vital Sign     Worried About Running Out of Food in the Last Year: Patient declined   Transportation Needs: Unknown (2/21/2024)    PRAPARE - Transportation     Lack of Transportation (Medical): Patient declined   Physical Activity: Unknown (2/21/2024)    Exercise Vital Sign     Days of Exercise per Week: 2 days   Stress: Stress Concern Present (9/26/2022)    [employer] - Occupational Stress Questionnaire     Feeling of Stress : To some extent   Social Connections: Unknown (2/21/2024)    Social Connection and Isolation Panel [NHANES]     Frequency of Communication with Friends and Family: Patient declined     Active Member of Clubs or Organizations: Patient declined     Attends Club or Organization Meetings: 1 to 4 times per year   Housing Stability: Low Risk  (9/26/2022)    Housing Stability Vital Sign      Unable to Pay for Housing in the Last Year: No     Number of Places Lived in the Last Year: 1     Unstable Housing in the Last Year: No     Review of patient's allergies indicates:  No Known Allergies    Current Outpatient Medications:     hydrALAZINE (APRESOLINE) 100 MG tablet, Take 1 tablet (100 mg total) by mouth 3 (three) times daily., Disp: 90 tablet, Rfl: 0    methoxy peg-epoetin beta (MIRCERA INJ), Inject 50 mcg into the skin every 28 days., Disp: , Rfl:     mirtazapine (REMERON) 15 MG tablet, Take 1 tablet (15 mg total) by mouth every evening. (Patient not taking: Reported on 3/14/2024), Disp: 30 tablet, Rfl: 11    NIFEdipine (PROCARDIA XL) 90 MG (OSM) 24 hr tablet, Take 1 tablet (90 mg total) by mouth once daily., Disp: 90 tablet, Rfl: 3    ondansetron (ZOFRAN-ODT) 4 MG TbDL, Take 2 tablets (8 mg total) by mouth every 8 (eight) hours as needed (nausea vomiting)., Disp: 30 tablet, Rfl: 0    oxyCODONE-acetaminophen (PERCOCET) 5-325 mg per tablet, Take 1 tablet by mouth every 8 (eight) hours as needed for Pain., Disp: 20 tablet, Rfl: 0    promethazine (PHENERGAN) 12.5 MG Tab, Take 1 tablet (12.5 mg total) by mouth every 6 (six) hours as needed (nausea and vomiting). (Patient not taking: Reported on 2/28/2024), Disp: 30 tablet, Rfl: 3    sevelamer carbonate (RENVELA) 800 mg Tab, Take 1 tablet (800 mg total) by mouth 3 (three) times daily with meals., Disp: 270 tablet, Rfl: 3  No current facility-administered medications for this visit.    Physical Exam    Wt Readings from Last 3 Encounters:   03/13/24 89.8 kg (198 lb)   03/06/24 90.2 kg (198 lb 13.7 oz)   03/06/24 89.8 kg (198 lb)     Temp Readings from Last 3 Encounters:   03/14/24 97.5 °F (36.4 °C) (Oral)   03/06/24 97.2 °F (36.2 °C) (Oral)   03/01/24 98.1 °F (36.7 °C) (Temporal)     BP Readings from Last 3 Encounters:   03/14/24 (!) 148/70   03/06/24 (!) 150/74   03/06/24 130/68     Pulse Readings from Last 3 Encounters:   03/14/24 67   03/06/24 78    03/06/24 93    VITAL SIGNS:  as above   GENERAL: appears well-built, well-nourished.  No anxiety, no agitation, and in no distress.  Patient is awake, alert, oriented and cooperative.  HEENT:  Showed no congestion. Trachea is central no obvious icterus or pallor noted no hoarseness. no obvious JVD   NECK:  Supple.  No JVD. No obvious cervical submental or supraclavicular adenopathy.  RS:the visualized portion of  Chest expands well. chest appears symmetric, no audible wheezes.  No dyspnea recognized  ABDOMEN:  abdomen appears undistended.  EXTREMITIES:  Without edema.  NEUROLOGICAL:  The patient is appropriate, higher functions are normal.  No  obvious neurological deficits.  normal judgement normal thought content  No confusion, no speech impediment. Cranial nerves are intact and show no deficit. No gross motor deficits noted   SKIN MUSCULOSKELETAL: no joint or skeletal deformity, no clubbing of nails.  No visible rash ecchymosis or petechiae     Lab Results   Component Value Date    WBC 4.14 03/14/2024    HGB 11.4 (L) 03/14/2024    HCT 34.8 (L) 03/14/2024    MCV 90 03/14/2024     03/14/2024       BMP  Lab Results   Component Value Date     (L) 03/14/2024    K 3.6 03/14/2024    CL 94 (L) 03/14/2024    CO2 27 03/14/2024    BUN 28 (H) 03/14/2024    CREATININE 6.0 (H) 03/14/2024    CALCIUM 9.6 03/14/2024    ANIONGAP 13 03/14/2024    ESTGFRAFRICA 10.4 (A) 02/23/2022    EGFRNONAA 9.0 (A) 02/23/2022     Lab Results   Component Value Date    IRON 82 02/09/2022    TIBC 306 02/09/2022    FERRITIN 582 (H) 02/09/2022     Patient has polyclonal hypogammaglobulinemia  mponent Ref Range & Units 4 wk ago   Free Kappa Lt Chains,S 0.3300 - 1.94 mg/dL 26.3 High    Free Lambda Lt Chains,S 0.5700 - 2.63 mg/dL 18.5 High    Kappa/Lambda FLC Ratio 0.2600 - 1.65 1.42       Impression:  With scan January 1924     Hypermetabolic lymphadenopathy throughout supraclavicular region, mediastinum, bilateral steven, and involving  upper abdomen.  Numerous tracer avid hepatic and splenic lesions.  Numerous tracer avid osseous lesions.  Overall findings are concerning for diffuse metastasis.  Index lesions as above.     Prominent tracer avid foci with probable associated nodular soft tissue in the transverse colon, could represent underlying primary neoplastic process.  Further evaluation with colonoscopy as clinically warranted.     Similar pulmonary micro nodules with compared to previous CT chest, too small to characterize with PET.     Additional findings as above.     This report was flagged in Epic as abnormal.    Patient Active Problem List   Diagnosis    Type 2 diabetes mellitus with stage 5 chronic kidney disease    Essential hypertension    Hyperlipidemia, acquired    ED (erectile dysfunction)    History of diabetic ulcer of foot    Osteomyelitis of second toe of left foot    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    Long term (current) use of antibiotics    Acute osteomyelitis of metatarsal bone of left foot    Diabetic ulcer of toe of left foot    Hyponatremia    Osteomyelitis of left foot    Hypoalbuminemia    Healed ulcer of left foot on examination    Iron deficiency anemia    Metabolic acidosis    Anemia due to chronic kidney disease, on chronic dialysis    Diabetic ulcer of left midfoot associated with type 2 diabetes mellitus, with fat layer exposed    Type II diabetes mellitus with neurological manifestations    Foot ulcer, right, with fat layer exposed    Foot ulceration, left, with fat layer exposed    ESRD on hemodialysis    Malignant renovascular hypertension    Calcified granuloma of lung    Abnormal findings on diagnostic imaging of lung    Tracheobronchomalacia    Intractable hiccups    Serum total bilirubin elevated    Hypertensive emergency    Lower extremity weakness    Abnormal MRI, lumbar spine    Goals of care, counseling/discussion     DJD (degenerative joint disease)    Complete lesion of L1 level of lumbar spinal cord    Debility    Metastasis to bone    Metastasis to lung    Metastasis to liver    Diffuse lymphadenopathy    Lytic bone lesions on xray    Hepatic lesion    Splenic lesion    Moderate protein-calorie malnutrition        Assessment and Plan      Widely ? met per imaging. Saw DR Ly and had EBUS which is negative for malignancy   -guided bone biopsy done pathology pending  Patient has moved back to [facility] was homeless previously when he saw me   We will get Oncology follow-up close to [facility]  If both above biopsies are negative patient may need involvement other specialties to explain all these lesions seen on imaging PET scan of January 1924 as above  Intractable hiccups;  got nerve block     ESRD : on dialysis  HTN : uncontrolled, seeing pcp was hospitalized  DM related foot ulcer, was on treatment some better now PET scan results to patient    Extended Care today over 55 minutes in involving and engaging , navigator due to patient's social situation as well as arranging for biopsy pathology explaining pet result to pt  Advance Care Planning     Date: 01/11/2024    Power of   I initiated the process of voluntary advance care planning today and explained the importance of this process to the patient.  I introduced the concept of advance directives to the patient, as well. Then the patient received detailed information about the importance of designating a Health Care Power of  (HCPOA). He was also instructed to communicate with this person about their wishes for future healthcare, should he become sick and lose decision-making capacity.

## 2024-03-15 NOTE — Clinical Note
Please get this patient appointment with [place] [facility] [place] this should be done quickly there is a pathology that would be coming up anytime soon

## 2024-03-16 NOTE — ANESTHESIA POSTPROCEDURE EVALUATION
Anesthesia Post Evaluation    Patient: Catalino Mcfadden    Procedure(s) Performed: Procedure(s) (LRB):  REVISION, AV FISTULA (Left)    Final Anesthesia Type: regional      Patient location during evaluation: PACU  Patient participation: Yes- Able to Participate  Level of consciousness: awake and alert and oriented  Post-procedure vital signs: reviewed and stable  Pain management: adequate  Airway patency: patent    PONV status at discharge: No PONV  Anesthetic complications: no      Cardiovascular status: blood pressure returned to baseline, hemodynamically stable and stable  Respiratory status: unassisted, spontaneous ventilation and room air  Hydration status: euvolemic  Follow-up not needed.              Vitals Value Taken Time   /70 03/14/24 1232   Temp 36.4 °C (97.5 °F) 03/14/24 1232   Pulse 67 03/14/24 1232   Resp 16 03/14/24 1232   SpO2 98 % 03/14/24 1232         Event Time   Out of Recovery 11:18:33         Pain/Madiha Score: Madiha Score: 10 (3/14/2024 12:32 PM)  Modified Madiha Score: 20 (3/14/2024 12:32 PM)

## 2024-03-17 LAB
ADEQUACY: NORMAL
COMMENT: NORMAL
FINAL PATHOLOGIC DIAGNOSIS: NORMAL
GROSS: NORMAL
Lab: NORMAL
MICROSCOPIC EXAM: NORMAL

## 2024-03-20 ENCOUNTER — TELEPHONE (OUTPATIENT)
Dept: HEMATOLOGY/ONCOLOGY | Facility: CLINIC | Age: 59
End: 2024-03-20
Payer: COMMERCIAL

## 2024-03-20 ENCOUNTER — TELEPHONE (OUTPATIENT)
Dept: ENDOSCOPY | Facility: HOSPITAL | Age: 59
End: 2024-03-20
Payer: COMMERCIAL

## 2024-03-20 DIAGNOSIS — Z12.11 SPECIAL SCREENING FOR MALIGNANT NEOPLASMS, COLON: Primary | ICD-10-CM

## 2024-03-20 NOTE — TELEPHONE ENCOUNTER
Dr aguirre would like to have this patients seen in the undiagnosed clinic as his path is non malignant If anything further develops just contact me Marleni brewer  ===View-only below this line===  ----- Message -----  From: Sanchez Aguirre MD  Sent: 3/20/2024  12:20 PM CDT  To: Federico Shane RN    I think he may be more appropriate in undiagnosed clinic.  ----- Message -----  From: Federico Shane, JUAN J  Sent: 3/20/2024  11:00 AM CDT  To: Sanchez Aguirre MD    Can you review this record and let me know if he needs to be seen by you  Adela sent me this referral but it appears the biopsy was neg on LN   and a core biopsy was neg Marleni brewer

## 2024-03-25 ENCOUNTER — TELEPHONE (OUTPATIENT)
Dept: ENDOSCOPY | Facility: HOSPITAL | Age: 59
End: 2024-03-25
Payer: COMMERCIAL

## 2024-03-25 ENCOUNTER — ANESTHESIA EVENT (OUTPATIENT)
Dept: ENDOSCOPY | Facility: HOSPITAL | Age: 59
End: 2024-03-25
Payer: COMMERCIAL

## 2024-03-25 ENCOUNTER — HOSPITAL ENCOUNTER (OUTPATIENT)
Facility: HOSPITAL | Age: 59
Discharge: HOME | End: 2024-03-25
Attending: INTERNAL MEDICINE | Admitting: INTERNAL MEDICINE
Payer: COMMERCIAL

## 2024-03-25 ENCOUNTER — PATIENT MESSAGE (OUTPATIENT)
Dept: ENDOSCOPY | Facility: HOSPITAL | Age: 59
End: 2024-03-25

## 2024-03-25 ENCOUNTER — ANESTHESIA (OUTPATIENT)
Dept: ENDOSCOPY | Facility: HOSPITAL | Age: 59
End: 2024-03-25
Payer: COMMERCIAL

## 2024-03-25 VITALS
SYSTOLIC BLOOD PRESSURE: 128 MMHG | TEMPERATURE: 98 F | HEART RATE: 82 BPM | WEIGHT: 178 LBS | RESPIRATION RATE: 16 BRPM | DIASTOLIC BLOOD PRESSURE: 82 MMHG | HEIGHT: 73 IN | BODY MASS INDEX: 23.59 KG/M2 | OXYGEN SATURATION: 96 %

## 2024-03-25 DIAGNOSIS — R93.3 ABNORMAL FINDING ON GI TRACT IMAGING: Primary | ICD-10-CM

## 2024-03-25 LAB
POCT GLUCOSE: 69 MG/DL (ref 70–110)
POCT GLUCOSE: 73 MG/DL (ref 70–110)
POCT GLUCOSE: 80 MG/DL (ref 70–110)

## 2024-03-25 PROCEDURE — 82962 GLUCOSE BLOOD TEST: CPT | Performed by: INTERNAL MEDICINE

## 2024-03-25 PROCEDURE — 25000003 PHARM REV CODE 250: Performed by: NURSE ANESTHETIST, CERTIFIED REGISTERED

## 2024-03-25 PROCEDURE — 63600175 PHARM REV CODE 636 W HCPCS: Performed by: NURSE ANESTHETIST, CERTIFIED REGISTERED

## 2024-03-25 PROCEDURE — 45378 DIAGNOSTIC COLONOSCOPY: CPT | Mod: ,,, | Performed by: INTERNAL MEDICINE

## 2024-03-25 PROCEDURE — 45378 DIAGNOSTIC COLONOSCOPY: CPT | Performed by: INTERNAL MEDICINE

## 2024-03-25 PROCEDURE — 37000008 HC ANESTHESIA 1ST 15 MINUTES: Performed by: INTERNAL MEDICINE

## 2024-03-25 PROCEDURE — 37000009 HC ANESTHESIA EA ADD 15 MINS: Performed by: INTERNAL MEDICINE

## 2024-03-25 PROCEDURE — E9220 PRA ENDO ANESTHESIA: HCPCS | Mod: ,,, | Performed by: NURSE ANESTHETIST, CERTIFIED REGISTERED

## 2024-03-25 RX ORDER — PROPOFOL 10 MG/ML
VIAL (ML) INTRAVENOUS
Status: DISCONTINUED | OUTPATIENT
Start: 2024-03-25 | End: 2024-03-25

## 2024-03-25 RX ORDER — SODIUM CHLORIDE 9 MG/ML
INJECTION, SOLUTION INTRAVENOUS CONTINUOUS
Status: DISCONTINUED | OUTPATIENT
Start: 2024-03-25 | End: 2024-03-25 | Stop reason: HOSPADM

## 2024-03-25 RX ADMIN — SODIUM CHLORIDE: 0.9 INJECTION, SOLUTION INTRAVENOUS at 09:03

## 2024-03-25 RX ADMIN — PROPOFOL 120 MG: 10 INJECTION, EMULSION INTRAVENOUS at 10:03

## 2024-03-25 NOTE — PLAN OF CARE
"Pt with glucose 69mg/dl. Pt denies weakness at present, but states that he "crashes fast".  Dr. Swartz Informed, order for 250cc D51/2NS bolus IV x 1 received and initiated.  "

## 2024-03-25 NOTE — ANESTHESIA PREPROCEDURE EVALUATION
03/25/2024  Catalino Mcfadden is a 58 y.o., male.    Past Medical History:   Diagnosis Date    Cancer     Diabetes mellitus, type 2     Diabetic foot ulcer associated with diabetes mellitus due to underlying condition 07/16/2020    ESRD on hemodialysis     Hyperlipidemia     Hypertension     Obese      Past Surgical History:   Procedure Laterality Date    AV FISTULA PLACEMENT Left 07/06/2023    Procedure: CREATION, AV FISTULA;  Surgeon: Daniel Poon MD;  Location: [facility];  Service: Vascular;  Laterality: Left;  RN PREOP 6/28/2023  LABS DAY OF SURGERY    BONE BIOPSY Left 07/17/2020    Procedure: BIOPSY, BONE;  Surgeon: Verona Whitmore DPM;  Location: [facility];  Service: Podiatry;  Laterality: Left;    CERVICAL DISC SURGERY  07/11/2016    DEBRIDEMENT Left 07/17/2020    Procedure: DEBRIDEMENT;  Surgeon: Verona Whitmore DPM;  Location: [facility];  Service: Podiatry;  Laterality: Left;    DEBRIDEMENT OF FOOT Right     toes & plantar surface    ENDOBRONCHIAL ULTRASOUND N/A 3/1/2024    Procedure: ENDOBRONCHIAL ULTRASOUND (EBUS);  Surgeon: Francisco Fleming MD;  Location: [facility] 2ND FLR;  Service: Pulmonary;  Laterality: N/A;    ESOPHAGEAL MANOMETRY WITH MEASUREMENT OF IMPEDANCE N/A 03/27/2023    Procedure: MANOMETRY, ESOPHAGUS, WITH IMPEDANCE MEASUREMENT;  Surgeon: Roopa Pérez MD;  Location: [facility] (4TH FLR);  Service: Endoscopy;  Laterality: N/A;  Belching and rumination protocol please  instructions via portal -     ESOPHAGOGASTRODUODENOSCOPY N/A 12/16/2022    Procedure: EGD (ESOPHAGOGASTRODUODENOSCOPY);  Surgeon: Eren Francois MD;  Location: [facility];  Service: Endoscopy;  Laterality: N/A;  Pt. on Dialysis. K+ ordered prior to procedure.EC    ESOPHAGOGASTRODUODENOSCOPY N/A 12/5/2023    Procedure: EGD (ESOPHAGOGASTRODUODENOSCOPY);  Surgeon: Kash Johnston MD;  Location: [facility];   Service: Endoscopy;  Laterality: N/A;    FRACTURE SURGERY Right     medial ankle, metal plate present    INJECTION OF ANESTHETIC AGENT AROUND NERVE Right 2/2/2024    Procedure: BLOCK, NERVE STELLATE GANGLION *HOLD ASPIRIN 5 DAYS PRIOR*;  Surgeon: Gokul Gonzalez MD;  [facility];  Service: Pain Management;  Laterality: Right;  [phone]    INSERTION OF TUNNELED CENTRAL VENOUS CATHETER (CVC) WITH SUBCUTANEOUS PORT N/A 06/11/2021    Procedure: TUNNEL CATH INSERTION WITHOUT PORT;  Surgeon: Dosc Diagnostic Provider;  Location: [facility] OR;  Service: Radiology;  Laterality: N/A;  11AM START---PHONE PREOP 6/10/21---COVID POSTIVE ON 7/2020---NO S/S    left leg orthopedic surgery Left     REVISION OF ARTERIOVENOUS FISTULA Left 3/14/2024    Procedure: REVISION, AV FISTULA;  Surgeon: Daniel Poon MD;  Location: [facility];  Service: Vascular;  Laterality: Left;  RN preop 3/6/2024----NEED ORDERS    UPPER GASTROINTESTINAL ENDOSCOPY       .prob      Pre-op Assessment    I have reviewed the Patient Summary Reports.     I have reviewed the Nursing Notes. I have reviewed the NPO Status.   I have reviewed the Medications.     Review of Systems  Anesthesia Hx:  No problems with previous Anesthesia             Denies Family Hx of Anesthesia complications.    Denies Personal Hx of Anesthesia complications.                    Hematology/Oncology:  Hematology Normal                                     Cardiovascular:     Hypertension                                        Renal/:  Chronic Renal Disease, Dialysis                Hepatic/GI:      Liver Disease,            Musculoskeletal:  Arthritis               Neurological:  Neurology Normal                                      Endocrine:  Diabetes, type 2           Dermatological:  Skin Normal        Physical Exam  General: Cooperative, Alert and Oriented    Airway:  Mallampati: II   Mouth Opening: Normal  TM Distance: Normal  Tongue: Normal  Neck ROM: Normal  ROM    Dental:  Edentulous  Many teeth missing. Poor dentition.       Anesthesia Plan  Type of Anesthesia, risks & benefits discussed:    Anesthesia Type: Gen Natural Airway  Intra-op Monitoring Plan: Standard ASA Monitors  Induction:  IV  Informed Consent: Informed consent signed with the Patient and all parties understand the risks and agree with anesthesia plan.  All questions answered.   ASA Score: 3  Day of Surgery Review of History & Physical: H&P Update referred to the surgeon/provider.I have interviewed and examined the patient. I have reviewed the patient's H&P dated: There are no significant changes.     Ready For Surgery From Anesthesia Perspective.     .

## 2024-03-25 NOTE — PROVATION PATIENT INSTRUCTIONS
Discharge Summary/Instructions after an Endoscopic Procedure  Patient Name: Catalino Mcfadden  Patient MRN: [id number]  Patient YOB: 1965 Monday, March 25, 2024  Roopa Pérez MD  Dear patient,  As a result of recent federal legislation (The Federal Cures Act), you may   receive lab or pathology results from your procedure in your [facility]   account before your physician is able to contact you. Your physician or   their representative will relay the results to you with their   recommendations at their soonest availability.  Thank you,  RESTRICTIONS:  During your procedure today, you received medications for sedation.  These   medications may affect your judgment, balance and coordination.  Therefore,   for 24 hours, you have the following restrictions:   - DO NOT drive a car, operate machinery, make legal/financial decisions,   sign important papers or drink alcohol.    ACTIVITY:  Today: no heavy lifting, straining or running due to procedural   sedation/anesthesia.  The following day: return to full activity including work.  DIET:  Eat and drink normally unless instructed otherwise.     TREATMENT FOR COMMON SIDE EFFECTS:  - Mild abdominal pain, nausea, belching, bloating or excessive gas:  rest,   eat lightly and use a heating pad.  - Sore Throat: treat with throat lozenges and/or gargle with warm salt   water.  - Because air was used during the procedure, expelling large amounts of air   from your rectum or belching is normal.  - If a bowel prep was taken, you may not have a bowel movement for 1-3 days.    This is normal.  SYMPTOMS TO WATCH FOR AND REPORT TO YOUR PHYSICIAN:  1. Abdominal pain or bloating, other than gas cramps.  2. Chest pain.  3. Back pain.  4. Signs of infection such as: chills or fever occurring within 24 hours   after the procedure.  5. Rectal bleeding, which would show as bright red, maroon, or black stools.   (A tablespoon of blood from the rectum is not serious, especially if    hemorrhoids are present.)  6. Vomiting.  7. Weakness or dizziness.  GO DIRECTLY TO THE NEAREST EMERGENCY ROOM IF YOU HAVE ANY OF THE FOLLOWING:      Difficulty breathing              Chills and/or fever over 101 F   Persistent vomiting and/or vomiting blood   Severe abdominal pain   Severe chest pain   Black, tarry stools   Bleeding- more than one tablespoon   Any other symptom or condition that you feel may need urgent attention  Your doctor recommends these additional instructions:  If any biopsies were taken, your doctors clinic will contact you in 1 to 2   weeks with any results.  - Discharge patient to home.   - Resume previous diet.   - Continue present medications.   - Repeat colonoscopy at appointment to be scheduled because the bowel   preparation was poor.  For questions, problems or results please call your physician - Roopa Pérez MD at Work:  [phone].  [facility], EMERGENCY ROOM PHONE NUMBER: [phone]  IF A COMPLICATION OR EMERGENCY SITUATION ARISES AND YOU ARE UNABLE TO REACH   YOUR PHYSICIAN - GO DIRECTLY TO THE EMERGENCY ROOM.  Roopa Pérez MD  3/25/2024 10:17:53 AM  This report has been verified and signed electronically.  Dear patient,  As a result of recent federal legislation (The Federal Cures Act), you may   receive lab or pathology results from your procedure in your [facility]   account before your physician is able to contact you. Your physician or   their representative will relay the results to you with their   recommendations at their soonest availability.  Thank you,  PROVATION

## 2024-03-25 NOTE — TRANSFER OF CARE
"Anesthesia Transfer of Care Note    Patient: Catalino Mcfadden    Procedure(s) Performed: Procedure(s) (LRB):  COLONOSCOPY (N/A)    Patient location: GI    Anesthesia Type: general    Transport from OR: Transported from OR on room air with adequate spontaneous ventilation    Post pain: adequate analgesia    Post assessment: no apparent anesthetic complications and tolerated procedure well    Post vital signs: stable    Level of consciousness: sedated    Nausea/Vomiting: no nausea/vomiting    Complications: none    Transfer of care protocol was followed    Last vitals: Visit Vitals  BP (!) 121/57 (BP Location: Left arm, Patient Position: Lying)   Pulse 77   Temp 36.8 °C (98.2 °F)   Resp 14   Ht 6' 1.25" (1.861 m)   Wt 80.7 kg (178 lb)   SpO2 98%   BMI 23.32 kg/m²     "

## 2024-03-25 NOTE — PLAN OF CARE
Glucose 80mg/dl with 100cc D51/2NS infused IV. Pt states that he is ready to leave.  Dr. Tomlin informed and states ok to d/c pt home. Pt denies weakness at present.

## 2024-03-25 NOTE — H&P
Short Stay Endoscopy History and Physical    PCP - No, Primary Doctor    Procedure - Colonoscopy  ASA - per anesthesia  Mallampati - per anesthesia  History of Anesthesia problems - no  Family history Anesthesia problems - no   Plan of anesthesia - General    HPI:  This is a 58 y.o. male here for evaluation of : abnormal imaging of the colon      ROS:  Constitutional: No fevers, chills, No weight loss  CV: No chest pain  Pulm: No cough, No shortness of breath  GI: see HPI  Derm: No rash    Medical History:  has a past medical history of Cancer, Diabetes mellitus, type 2, Diabetic foot ulcer associated with diabetes mellitus due to underlying condition (07/16/2020), ESRD on hemodialysis, Hyperlipidemia, Hypertension, and Obese.    Surgical History:  has a past surgical history that includes Cervical disc surgery (07/11/2016); left leg orthopedic surgery (Left); Debridement (Left, 07/17/2020); Bone biopsy (Left, 07/17/2020); Debridement of foot (Right); Fracture surgery (Right); Insertion of tunneled central venous catheter (CVC) with subcutaneous port (N/A, 06/11/2021); Esophagogastroduodenoscopy (N/A, 12/16/2022); Esophageal manometry with measurement of impedance (N/A, 03/27/2023); AV fistula placement (Left, 07/06/2023); Upper gastrointestinal endoscopy; Esophagogastroduodenoscopy (N/A, 12/5/2023); Injection of anesthetic agent around nerve (Right, 2/2/2024); Endobronchial ultrasound (N/A, 3/1/2024); and Revision of arteriovenous fistula (Left, 3/14/2024).    Family History: family history includes Heart disease in his father. He was adopted.. Otherwise no colon cancer, inflammatory bowel disease, or GI malignancies.    Social History:  reports that he has never smoked. He has never used smokeless tobacco. He reports that he does not currently use alcohol. He reports that he does not use drugs.    Review of patient's allergies indicates:  No Known Allergies    Medications:   Medications Prior to Admission    Medication Sig Dispense Refill Last Dose    hydrALAZINE (APRESOLINE) 100 MG tablet Take 1 tablet (100 mg total) by mouth 3 (three) times daily. 90 tablet 0     methoxy peg-epoetin beta (MIRCERA INJ) Inject 50 mcg into the skin every 28 days.       mirtazapine (REMERON) 15 MG tablet Take 1 tablet (15 mg total) by mouth every evening. (Patient not taking: Reported on 3/14/2024) 30 tablet 11     NIFEdipine (PROCARDIA XL) 90 MG (OSM) 24 hr tablet Take 1 tablet (90 mg total) by mouth once daily. 90 tablet 3     ondansetron (ZOFRAN-ODT) 4 MG TbDL Take 2 tablets (8 mg total) by mouth every 8 (eight) hours as needed (nausea vomiting). 30 tablet 0     oxyCODONE-acetaminophen (PERCOCET) 5-325 mg per tablet Take 1 tablet by mouth every 8 (eight) hours as needed for Pain. 20 tablet 0     promethazine (PHENERGAN) 12.5 MG Tab Take 1 tablet (12.5 mg total) by mouth every 6 (six) hours as needed (nausea and vomiting). (Patient not taking: Reported on 2/28/2024) 30 tablet 3     sevelamer carbonate (RENVELA) 800 mg Tab Take 1 tablet (800 mg total) by mouth 3 (three) times daily with meals. 270 tablet 3          Physical Exam:    Vital Signs: There were no vitals filed for this visit.    Gen: NAD, lying comfortably  HENT: NCAT, oropharynx clear  Eyes: anicteric sclerae, EOMI grossly  Neck: supple, no visible masses/goiter  Cardiac: RRR  Lungs: non-labored breathing  Abd: soft, NT/ND, normoactive BS  Ext: no LE edema, warm, well perfused  Skin: skin intact on exposed body parts, no visible rashes, lesions  Neuro: A&Ox4, neuro exam grossly intact, moves all extremities  Psych: appropriate mood, affect        Labs:  Lab Results   Component Value Date    WBC 4.14 03/14/2024    HGB 11.4 (L) 03/14/2024    HCT 34.8 (L) 03/14/2024     03/14/2024    CHOL 177 02/22/2023    TRIG 56 02/22/2023    HDL 41 02/22/2023    ALT 26 02/26/2024    AST 43 (H) 02/26/2024     (L) 03/14/2024    K 3.6 03/14/2024    CL 94 (L) 03/14/2024     CREATININE 6.0 (H) 03/14/2024    BUN 28 (H) 03/14/2024    CO2 27 03/14/2024    TSH 1.780 02/09/2022    PSA 1.6 02/23/2022    INR 1.1 02/26/2024    HGBA1C 5.1 02/22/2023       Plan:  Colonoscopy for abnormal imaging of the colon.    I have explained the risks and benefits of endoscopy procedures to the patient including but not limited to bleeding, perforation, infection, and death.  The patient was asked if they understand and allowed to ask any further questions to their satisfaction.    Roopa Pérez MD

## 2024-03-25 NOTE — ANESTHESIA POSTPROCEDURE EVALUATION
Anesthesia Post Evaluation    Patient: Catalino Mcfadden    Procedure(s) Performed: Procedure(s) (LRB):  COLONOSCOPY (N/A)    Final Anesthesia Type: general      Patient location during evaluation: PACU  Patient participation: Yes- Able to Participate  Level of consciousness: awake and alert  Post-procedure vital signs: reviewed and stable  Pain management: adequate  Airway patency: patent    PONV status at discharge: No PONV  Anesthetic complications: no      Cardiovascular status: blood pressure returned to baseline  Respiratory status: spontaneous ventilation and room air  Hydration status: euvolemic  Follow-up not needed.              Vitals Value Taken Time   /58 03/25/24 1033   Temp 36.8 °C (98.2 °F) 03/25/24 1018   Pulse 82 03/25/24 1033   Resp 14 03/25/24 1033   SpO2 100 % 03/25/24 1033         No case tracking events are documented in the log.      Pain/Madiha Score: Madiha Score: 10 (3/25/2024 10:33 AM)

## 2024-03-26 ENCOUNTER — TELEPHONE (OUTPATIENT)
Dept: ENDOSCOPY | Facility: HOSPITAL | Age: 59
End: 2024-03-26
Payer: COMMERCIAL

## 2024-03-26 DIAGNOSIS — Z12.11 SCREEN FOR COLON CANCER: Primary | ICD-10-CM

## 2024-03-26 DIAGNOSIS — R93.3 ABNORMAL FINDING ON GI TRACT IMAGING: ICD-10-CM

## 2024-03-26 RX ORDER — POLYETHYLENE GLYCOL 3350, SODIUM SULFATE ANHYDROUS, SODIUM BICARBONATE, SODIUM CHLORIDE, POTASSIUM CHLORIDE 236; 22.74; 6.74; 5.86; 2.97 G/4L; G/4L; G/4L; G/4L; G/4L
POWDER, FOR SOLUTION ORAL
Qty: 8000 ML | Refills: 0 | Status: SHIPPED | OUTPATIENT
Start: 2024-03-26 | End: 2024-04-15

## 2024-03-26 NOTE — TELEPHONE ENCOUNTER
"----- Message from Landon Duke MA sent at 3/25/2024  4:19 PM CDT -----  Regarding: FW: Colonoscopy, poor prep, please reschedule tomorrow if possible with continued prep    ----- Message -----  From: Velma Joshi  Sent: 3/25/2024   3:04 PM CDT  To: Landon Duke MA  Subject: FW: Colonoscopy, poor prep, please reschedul#      ----- Message -----  From: Roopa Pérez MD  Sent: 3/25/2024  10:23 AM CDT  To: [facility]  Subject: Colonoscopy, poor prep, please reschedule to#    Procedure: Colonoscopy    Diagnosis: Abnormal finding on GI tract imaging, Large colon polyps on todays exam not removed due to prep    Procedure Timing: tomorrow if possible with Dr. Hess, opening noted on 2nd floor, if not reschedule next available with Dr. Pérez     #If within 4 weeks selected, please david as high priority#    #If greater than 12 weeks, please select "5-12 weeks" and delay sending until 3 months prior to requested date#     Provider: Any endoscopist    Location: No Preference    Additional Scheduling Information: ESRD on HD will need a repeat potassium    Prep Specifications:if tomorrow then just needs another prep, if scheduled another day then needs extended constipation prep    Is the patient taking a GLP-1 Agonist:no    Have you attached a patient to this message: yes          "

## 2024-03-26 NOTE — TELEPHONE ENCOUNTER
Spoke to pt to schedule procedure(s) Colonoscopy       Physician to perform procedure(s) Dr. AMANDA Pérez   Date of Procedure (s) 04/08/24  Arrival Time 1:00 PM  Time of Procedure(s) 2:15 PM   Location of Procedure(s) [facility] 2nd Floor  Type of Rx Prep sent to patient: PEG/Extended  Instructions provided to patient via [facility]    Patient was informed on the following information and verbalized understanding. Screening questionnaire reviewed with patient and complete. If procedure requires anesthesia, a responsible adult needs to be present to accompany the patient home, patient cannot drive after receiving anesthesia. Appointment details are tentative, especially check-in time. Patient will receive a prep-op call 7 days prior to confirm check-in time for procedure. If applicable the patient should contact their pharmacy to verify Rx for procedure prep is ready for pick-up. Patient was advised to call the scheduling department at [phone] if pharmacy states no Rx is available. Patient was advised to call the endoscopy scheduling department if any questions or concerns arise.      [facility] Scheduling Department

## 2024-04-01 ENCOUNTER — PATIENT MESSAGE (OUTPATIENT)
Dept: GASTROENTEROLOGY | Facility: CLINIC | Age: 59
End: 2024-04-01
Payer: COMMERCIAL

## 2024-04-01 DIAGNOSIS — R06.6 HICCUPS: Primary | ICD-10-CM

## 2024-04-01 LAB — FUNGUS SPEC CULT: NORMAL

## 2024-04-02 ENCOUNTER — TELEPHONE (OUTPATIENT)
Dept: ENDOSCOPY | Facility: HOSPITAL | Age: 59
End: 2024-04-02
Payer: COMMERCIAL

## 2024-04-02 ENCOUNTER — TELEPHONE (OUTPATIENT)
Dept: HEMATOLOGY/ONCOLOGY | Facility: CLINIC | Age: 59
End: 2024-04-02
Payer: COMMERCIAL

## 2024-04-02 DIAGNOSIS — Z99.2 DIALYSIS PATIENT: Primary | ICD-10-CM

## 2024-04-02 NOTE — TELEPHONE ENCOUNTER
Left voicemail for pt to call Endoscopy Scheduling to confirm colonoscopy and Labs prior to procedure on 4/8/24.

## 2024-04-02 NOTE — TELEPHONE ENCOUNTER
Patient notified of the scheduled appointment with Dr. Mendoza for 04/15/24.  Patient states that he does not have an address.  Patient is active on the patient portal.  Offered a sooner appointment.  Patient declined.

## 2024-04-05 ENCOUNTER — TELEPHONE (OUTPATIENT)
Dept: ENDOSCOPY | Facility: HOSPITAL | Age: 59
End: 2024-04-05
Payer: COMMERCIAL

## 2024-04-05 RX ORDER — IMIPRAMINE HYDROCHLORIDE 25 MG/1
25 TABLET, FILM COATED ORAL NIGHTLY
Qty: 30 TABLET | Refills: 11 | Status: ON HOLD | OUTPATIENT
Start: 2024-04-05 | End: 2024-04-17 | Stop reason: HOSPADM

## 2024-04-06 ENCOUNTER — NURSE TRIAGE (OUTPATIENT)
Dept: ADMINISTRATIVE | Facility: CLINIC | Age: 59
End: 2024-04-06
Payer: COMMERCIAL

## 2024-04-06 NOTE — TELEPHONE ENCOUNTER
Scheduled procedure tomorrow. Prep out of stock at [facility] [place] location. Pt now at [address] location & was told order not in system.     Contacted [facility]-spoke with Judy, states no new order. Explained pt requesting transfer, she did not realize pt was requesting transfer, confirms order in system & able to process transfer to current location. Pt in pharmacy at time of call & updated by OOC nurse.     Reason for Disposition   [1] Prescription prescribed recently is not at pharmacy AND [2] triager has access to patient's EMR AND [3] prescription is recorded in the EMR    Protocols used: Medication Refill and Renewal Call-A-

## 2024-04-08 ENCOUNTER — ANESTHESIA EVENT (OUTPATIENT)
Dept: ENDOSCOPY | Facility: HOSPITAL | Age: 59
End: 2024-04-08
Payer: COMMERCIAL

## 2024-04-08 ENCOUNTER — ANESTHESIA (OUTPATIENT)
Dept: ENDOSCOPY | Facility: HOSPITAL | Age: 59
End: 2024-04-08
Payer: COMMERCIAL

## 2024-04-08 ENCOUNTER — TELEPHONE (OUTPATIENT)
Dept: ENDOSCOPY | Facility: HOSPITAL | Age: 59
End: 2024-04-08
Payer: COMMERCIAL

## 2024-04-08 ENCOUNTER — HOSPITAL ENCOUNTER (OUTPATIENT)
Facility: HOSPITAL | Age: 59
Discharge: HOME | End: 2024-04-08
Attending: INTERNAL MEDICINE | Admitting: INTERNAL MEDICINE
Payer: COMMERCIAL

## 2024-04-08 VITALS
HEART RATE: 73 BPM | SYSTOLIC BLOOD PRESSURE: 186 MMHG | HEIGHT: 74 IN | BODY MASS INDEX: 25.03 KG/M2 | DIASTOLIC BLOOD PRESSURE: 89 MMHG | OXYGEN SATURATION: 100 % | RESPIRATION RATE: 18 BRPM | TEMPERATURE: 98 F | WEIGHT: 195 LBS

## 2024-04-08 DIAGNOSIS — K63.5 POLYP OF COLON, UNSPECIFIED PART OF COLON, UNSPECIFIED TYPE: Primary | ICD-10-CM

## 2024-04-08 DIAGNOSIS — Z86.010 HISTORY OF COLON POLYPS: ICD-10-CM

## 2024-04-08 LAB
POCT GLUCOSE: 74 MG/DL (ref 70–110)
POCT GLUCOSE: 93 MG/DL (ref 70–110)

## 2024-04-08 PROCEDURE — 88305 TISSUE EXAM BY PATHOLOGIST: CPT | Mod: 26,,, | Performed by: PATHOLOGY

## 2024-04-08 PROCEDURE — 27200997: Performed by: INTERNAL MEDICINE

## 2024-04-08 PROCEDURE — 27201012 HC FORCEPS, HOT/COLD, DISP: Performed by: INTERNAL MEDICINE

## 2024-04-08 PROCEDURE — 37000009 HC ANESTHESIA EA ADD 15 MINS: Performed by: INTERNAL MEDICINE

## 2024-04-08 PROCEDURE — 45385 COLONOSCOPY W/LESION REMOVAL: CPT | Performed by: INTERNAL MEDICINE

## 2024-04-08 PROCEDURE — D9220A PRA ANESTHESIA: Mod: ANES,,, | Performed by: ANESTHESIOLOGY

## 2024-04-08 PROCEDURE — 27202363 HC INJECTION AGENT, SUBMUCOSAL, ANY: Performed by: INTERNAL MEDICINE

## 2024-04-08 PROCEDURE — 63600175 PHARM REV CODE 636 W HCPCS: Performed by: REGISTERED NURSE

## 2024-04-08 PROCEDURE — 45380 COLONOSCOPY AND BIOPSY: CPT | Mod: 59,,, | Performed by: INTERNAL MEDICINE

## 2024-04-08 PROCEDURE — 45380 COLONOSCOPY AND BIOPSY: CPT | Mod: 59 | Performed by: INTERNAL MEDICINE

## 2024-04-08 PROCEDURE — 45385 COLONOSCOPY W/LESION REMOVAL: CPT | Mod: 22,,, | Performed by: INTERNAL MEDICINE

## 2024-04-08 PROCEDURE — 45381 COLONOSCOPY SUBMUCOUS NJX: CPT | Performed by: INTERNAL MEDICINE

## 2024-04-08 PROCEDURE — 45381 COLONOSCOPY SUBMUCOUS NJX: CPT | Mod: ,,, | Performed by: INTERNAL MEDICINE

## 2024-04-08 PROCEDURE — 27201028 HC NEEDLE, SCLERO: Performed by: INTERNAL MEDICINE

## 2024-04-08 PROCEDURE — 88305 TISSUE EXAM BY PATHOLOGIST: CPT | Performed by: PATHOLOGY

## 2024-04-08 PROCEDURE — 37000008 HC ANESTHESIA 1ST 15 MINUTES: Performed by: INTERNAL MEDICINE

## 2024-04-08 PROCEDURE — 25000003 PHARM REV CODE 250: Performed by: REGISTERED NURSE

## 2024-04-08 PROCEDURE — 27201089 HC SNARE, DISP (ANY): Performed by: INTERNAL MEDICINE

## 2024-04-08 PROCEDURE — 82962 GLUCOSE BLOOD TEST: CPT | Performed by: INTERNAL MEDICINE

## 2024-04-08 PROCEDURE — D9220A PRA ANESTHESIA: Mod: CRNA,,, | Performed by: REGISTERED NURSE

## 2024-04-08 RX ORDER — PROPOFOL 10 MG/ML
VIAL (ML) INTRAVENOUS
Status: DISCONTINUED | OUTPATIENT
Start: 2024-04-08 | End: 2024-04-08

## 2024-04-08 RX ORDER — ONDANSETRON HYDROCHLORIDE 2 MG/ML
4 INJECTION, SOLUTION INTRAVENOUS DAILY PRN
Status: DISCONTINUED | OUTPATIENT
Start: 2024-04-08 | End: 2024-04-08 | Stop reason: HOSPADM

## 2024-04-08 RX ORDER — PROPOFOL 10 MG/ML
INJECTION, EMULSION INTRAVENOUS CONTINUOUS PRN
Status: DISCONTINUED | OUTPATIENT
Start: 2024-04-08 | End: 2024-04-08

## 2024-04-08 RX ORDER — SODIUM CHLORIDE 0.9 % (FLUSH) 0.9 %
10 SYRINGE (ML) INJECTION
Status: DISCONTINUED | OUTPATIENT
Start: 2024-04-08 | End: 2024-04-08 | Stop reason: HOSPADM

## 2024-04-08 RX ORDER — SODIUM CHLORIDE 9 MG/ML
INJECTION, SOLUTION INTRAVENOUS CONTINUOUS
Status: DISCONTINUED | OUTPATIENT
Start: 2024-04-08 | End: 2024-04-08 | Stop reason: HOSPADM

## 2024-04-08 RX ADMIN — SODIUM CHLORIDE: 9 INJECTION, SOLUTION INTRAVENOUS at 02:04

## 2024-04-08 RX ADMIN — PROPOFOL 175 MCG/KG/MIN: 10 INJECTION, EMULSION INTRAVENOUS at 02:04

## 2024-04-08 RX ADMIN — PROPOFOL 70 MG: 10 INJECTION, EMULSION INTRAVENOUS at 02:04

## 2024-04-08 NOTE — ANESTHESIA PREPROCEDURE EVALUATION
04/08/2024  Catalino Mcfadden is a 59 y.o., male.    Past Medical History:   Diagnosis Date    Cancer     Diabetes mellitus, type 2     Diabetic foot ulcer associated with diabetes mellitus due to underlying condition 07/16/2020    ESRD on hemodialysis     Hyperlipidemia     Hypertension     Obese      Past Surgical History:   Procedure Laterality Date    AV FISTULA PLACEMENT Left 07/06/2023    Procedure: CREATION, AV FISTULA;  Surgeon: Daniel Poon MD;  Location: [facility] OR;  Service: Vascular;  Laterality: Left;  RN PREOP 6/28/2023  LABS DAY OF SURGERY    BONE BIOPSY Left 07/17/2020    Procedure: BIOPSY, BONE;  Surgeon: Verona Whitmore DPM;  Location: [facility] OR;  Service: Podiatry;  Laterality: Left;    CERVICAL DISC SURGERY  07/11/2016    COLONOSCOPY N/A 3/25/2024    Procedure: COLONOSCOPY;  Surgeon: Roopa Pérez MD;  Location: [facility] (4TH FLR);  Service: Endoscopy;  Laterality: N/A;  Ref By: Dr. Pérez   constipation prep  Diaylsis Patient Lab has been ordered  3/20-precall complete-MS    DEBRIDEMENT Left 07/17/2020    Procedure: DEBRIDEMENT;  Surgeon: Verona Whitmore DPM;  Location: [facility] OR;  Service: Podiatry;  Laterality: Left;    DEBRIDEMENT OF FOOT Right     toes & plantar surface    ENDOBRONCHIAL ULTRASOUND N/A 3/1/2024    Procedure: ENDOBRONCHIAL ULTRASOUND (EBUS);  Surgeon: Francisco Fleming MD;  Location: [facility] OR 2ND FLR;  Service: Pulmonary;  Laterality: N/A;    ESOPHAGEAL MANOMETRY WITH MEASUREMENT OF IMPEDANCE N/A 03/27/2023    Procedure: MANOMETRY, ESOPHAGUS, WITH IMPEDANCE MEASUREMENT;  Surgeon: Roopa Pérez MD;  Location: [facility] (4TH FLR);  Service: Endoscopy;  Laterality: N/A;  Belching and rumination protocol please  instructions via portal - sm    ESOPHAGOGASTRODUODENOSCOPY N/A 12/16/2022    Procedure: EGD (ESOPHAGOGASTRODUODENOSCOPY);  Surgeon: Eren Francois MD;   Location: [facility] ENDO;  Service: Endoscopy;  Laterality: N/A;  Pt. on Dialysis. K+ ordered prior to procedure.EC    ESOPHAGOGASTRODUODENOSCOPY N/A 12/5/2023    Procedure: EGD (ESOPHAGOGASTRODUODENOSCOPY);  Surgeon: Kash Johnston MD;  Location: [facility] ENDO;  Service: Endoscopy;  Laterality: N/A;    FRACTURE SURGERY Right     medial ankle, metal plate present    INJECTION OF ANESTHETIC AGENT AROUND NERVE Right 2/2/2024    Procedure: BLOCK, NERVE STELLATE GANGLION *HOLD ASPIRIN 5 DAYS PRIOR*;  Surgeon: Gokul Gonzalez MD;  Location: [facility]GT;  Service: Pain Management;  Laterality: Right;  [phone]    INSERTION OF TUNNELED CENTRAL VENOUS CATHETER (CVC) WITH SUBCUTANEOUS PORT N/A 06/11/2021    Procedure: TUNNEL CATH INSERTION WITHOUT PORT;  Surgeon: Jose Manuel Diagnostic Provider;  Location: [facility] OR;  Service: Radiology;  Laterality: N/A;  11AM START---PHONE PREOP 6/10/21---COVID POSTIVE ON 7/2020---NO S/S    left leg orthopedic surgery Left     REVISION OF ARTERIOVENOUS FISTULA Left 3/14/2024    Procedure: REVISION, AV FISTULA;  Surgeon: Daniel Poon MD;  Location: [facility] OR;  Service: Vascular;  Laterality: Left;  RN preop 3/6/2024----NEED ORDERS    UPPER GASTROINTESTINAL ENDOSCOPY       .prob      Pre-op Assessment    I have reviewed the Patient Summary Reports.     I have reviewed the Nursing Notes. I have reviewed the NPO Status.   I have reviewed the Medications.     Review of Systems  Anesthesia Hx:  No problems with previous Anesthesia             Denies Family Hx of Anesthesia complications.    Denies Personal Hx of Anesthesia complications.                    Hematology/Oncology:  Hematology Normal                                     Cardiovascular:     Hypertension                                        Renal/:  Chronic Renal Disease, Dialysis                Hepatic/GI:      Liver Disease,            Musculoskeletal:  Arthritis               Neurological:  Neurology Normal                                       Endocrine:  Diabetes, type 2           Dermatological:  Skin Normal        Physical Exam  General: Cooperative, Alert and Oriented    Airway:  Mallampati: II   Mouth Opening: Normal  TM Distance: Normal  Tongue: Normal  Neck ROM: Normal ROM    Dental:  Edentulous  Many teeth missing. Poor dentition.   Chest/Lungs:  Clear to auscultation, Normal Respiratory Rate    Heart:  Rate: Normal  Rhythm: Regular Rhythm        Anesthesia Plan  Type of Anesthesia, risks & benefits discussed:    Anesthesia Type: Gen Natural Airway  Intra-op Monitoring Plan: Standard ASA Monitors  Post Op Pain Control Plan: IV/PO Opioids PRN  Induction:  IV  Airway Plan: Direct and Video, Post-Induction  Informed Consent: Informed consent signed with the Patient and all parties understand the risks and agree with anesthesia plan.  All questions answered.   ASA Score: 3  Day of Surgery Review of History & Physical: H&P Update referred to the surgeon/provider.    Ready For Surgery From Anesthesia Perspective.     .

## 2024-04-08 NOTE — OR NURSING
Notified Dr. Mendoza that pt's K 3.4, no orders received, ok to proceed with colonoscopy    Jovan Christine

## 2024-04-08 NOTE — ANESTHESIA POSTPROCEDURE EVALUATION
Anesthesia Post Evaluation    Patient: Catalino Mcfadden    Procedure(s) Performed: Procedure(s) (LRB):  COLONOSCOPY (N/A)    Final Anesthesia Type: general      Patient location during evaluation: PACU  Patient participation: Yes- Able to Participate  Level of consciousness: awake and alert and oriented  Post-procedure vital signs: reviewed and stable  Pain management: adequate  Airway patency: patent    PONV status at discharge: No PONV  Anesthetic complications: no      Cardiovascular status: blood pressure returned to baseline and hemodynamically stable  Respiratory status: unassisted, spontaneous ventilation and room air  Hydration status: euvolemic  Follow-up not needed.              Vitals Value Taken Time   /89 04/08/24 1631   Temp 36.8 °C (98.2 °F) 04/08/24 1630   Pulse 73 04/08/24 1631   Resp 14 04/08/24 1631   SpO2 100 % 04/08/24 1631   Vitals shown include unvalidated device data.      No case tracking events are documented in the log.      Pain/Madiha Score: Madiha Score: 10 (4/8/2024  4:00 PM)

## 2024-04-08 NOTE — TRANSFER OF CARE
"Anesthesia Transfer of Care Note    Patient: Catalino Mcfadden    Procedure(s) Performed: Procedure(s) (LRB):  COLONOSCOPY (N/A)    Patient location: [facility]    Anesthesia Type: general    Transport from OR: Transported from OR on 2-3 L/min O2 by NC with adequate spontaneous ventilation    Post pain: adequate analgesia    Post assessment: no apparent anesthetic complications and tolerated procedure well    Post vital signs: stable    Level of consciousness: responds to stimulation    Nausea/Vomiting: no nausea/vomiting    Complications: none    Transfer of care protocol was followedComments: Nurse at bedside, VSS, spont reg resp noted    Last vitals: Visit Vitals  BP (!) 141/76   Pulse 73   Temp 36.6 °C (97.9 °F) (Temporal)   Resp 17   Ht 6' 1.5" (1.867 m)   Wt 88.5 kg (195 lb)   SpO2 97%   BMI 25.38 kg/m²     "

## 2024-04-08 NOTE — TELEPHONE ENCOUNTER
Returned pt call. Answered colonoscopy prep questions. Emphasized blood work in lab department prior to check in for procedure on 2nd floor.

## 2024-04-08 NOTE — PROV[employer] PATIENT INSTRUCTIONS
Discharge Summary/Instructions after an Endoscopic Procedure  Patient Name: Catalino Mcfadden  Patient MRN: [id number]  Patient YOB: 1965 Monday, April 8, 2024  Roopa Pérez MD  Dear patient,  As a result of recent federal legislation (The Federal Cures Act), you may   receive lab or pathology results from your procedure in your [facility]   account before your physician is able to contact you. Your physician or   their representative will relay the results to you with their   recommendations at their soonest availability.  Thank you,  RESTRICTIONS:  During your procedure today, you received medications for sedation.  These   medications may affect your judgment, balance and coordination.  Therefore,   for 24 hours, you have the following restrictions:   - DO NOT drive a car, operate machinery, make legal/financial decisions,   sign important papers or drink alcohol.    ACTIVITY:  Today: no heavy lifting, straining or running due to procedural   sedation/anesthesia.  The following day: return to full activity including work.  DIET:  Eat and drink normally unless instructed otherwise.     TREATMENT FOR COMMON SIDE EFFECTS:  - Mild abdominal pain, nausea, belching, bloating or excessive gas:  rest,   eat lightly and use a heating pad.  - Sore Throat: treat with throat lozenges and/or gargle with warm salt   water.  - Because air was used during the procedure, expelling large amounts of air   from your rectum or belching is normal.  - If a bowel prep was taken, you may not have a bowel movement for 1-3 days.    This is normal.  SYMPTOMS TO WATCH FOR AND REPORT TO YOUR PHYSICIAN:  1. Abdominal pain or bloating, other than gas cramps.  2. Chest pain.  3. Back pain.  4. Signs of infection such as: chills or fever occurring within 24 hours   after the procedure.  5. Rectal bleeding, which would show as bright red, maroon, or black stools.   (A tablespoon of blood from the rectum is not serious, especially if    hemorrhoids are present.)  6. Vomiting.  7. Weakness or dizziness.  GO DIRECTLY TO THE NEAREST EMERGENCY ROOM IF YOU HAVE ANY OF THE FOLLOWING:      Difficulty breathing              Chills and/or fever over 101 F   Persistent vomiting and/or vomiting blood   Severe abdominal pain   Severe chest pain   Black, tarry stools   Bleeding- more than one tablespoon   Any other symptom or condition that you feel may need urgent attention  Your doctor recommends these additional instructions:  If any biopsies were taken, your doctors clinic will contact you in 1 to 2   weeks with any results.  - Discharge patient to home.   - Resume previous diet.   - Continue present medications.   - Await pathology results.   - Repeat colonoscopy at appointment to be scheduled to review the   polypectomy site and for cecal polyp removal with the [facility] service.  For questions, problems or results please call your physician - Roopa Pérez MD at Work:  [phone].  [facility] [place], EMERGENCY ROOM PHONE NUMBER: [phone]  IF A COMPLICATION OR EMERGENCY SITUATION ARISES AND YOU ARE UNABLE TO REACH   YOUR PHYSICIAN - GO DIRECTLY TO THE EMERGENCY ROOM.  Roopa Pérez MD  4/8/2024 3:20:00 PM  This report has been verified and signed electronically.  Dear patient,  As a result of recent federal legislation (The Federal Cures Act), you may   receive lab or pathology results from your procedure in your [facility]   account before your physician is able to contact you. Your physician or   their representative will relay the results to you with their   recommendations at their soonest availability.  Thank you,  [employer]ATION

## 2024-04-08 NOTE — H&P
Short Stay Endoscopy History and Physical    PCP - No, Primary Doctor    Procedure - Colonoscopy  ASA - per anesthesia  Mallampati - per anesthesia  History of Anesthesia problems - no  Family history Anesthesia problems - no   Plan of anesthesia - General    HPI:  This is a 59 y.o. male here for evaluation of : personal history of colon polyps, abnormal PET scan.      ROS:  Constitutional: No fevers, chills, No weight loss  CV: No chest pain  Pulm: No cough, No shortness of breath  GI: see HPI  Derm: No rash    Medical History:  has a past medical history of Cancer, Diabetes mellitus, type 2, Diabetic foot ulcer associated with diabetes mellitus due to underlying condition (07/16/2020), ESRD on hemodialysis, Hyperlipidemia, Hypertension, and Obese.    Surgical History:  has a past surgical history that includes Cervical disc surgery (07/11/2016); left leg orthopedic surgery (Left); Debridement (Left, 07/17/2020); Bone biopsy (Left, 07/17/2020); Debridement of foot (Right); Fracture surgery (Right); Insertion of tunneled central venous catheter (CVC) with subcutaneous port (N/A, 06/11/2021); Esophagogastroduodenoscopy (N/A, 12/16/2022); Esophageal manometry with measurement of impedance (N/A, 03/27/2023); AV fistula placement (Left, 07/06/2023); Upper gastrointestinal endoscopy; Esophagogastroduodenoscopy (N/A, 12/5/2023); Injection of anesthetic agent around nerve (Right, 2/2/2024); Endobronchial ultrasound (N/A, 3/1/2024); Revision of arteriovenous fistula (Left, 3/14/2024); and Colonoscopy (N/A, 3/25/2024).    Family History: family history includes Heart disease in his father. He was adopted.. Otherwise no colon cancer, inflammatory bowel disease, or GI malignancies.    Social History:  reports that he has never smoked. He has never used smokeless tobacco. He reports that he does not currently use alcohol. He reports that he does not use drugs.    Review of patient's allergies indicates:  No Known  Allergies    Medications:   Medications Prior to Admission   Medication Sig Dispense Refill Last Dose    hydrALAZINE (APRESOLINE) 100 MG tablet Take 1 tablet (100 mg total) by mouth 3 (three) times daily. 270 tablet 3     imipramine (TOFRANIL) 25 MG tablet Take 1 tablet (25 mg total) by mouth every evening. 30 tablet 11     methoxy peg-epoetin beta (MIRCERA INJ) Inject 50 mcg into the skin every 28 days.       mirtazapine (REMERON) 15 MG tablet Take 1 tablet (15 mg total) by mouth every evening. (Patient not taking: Reported on 3/14/2024) 30 tablet 11     NIFEdipine (PROCARDIA XL) 90 MG (OSM) 24 hr tablet Take 1 tablet (90 mg total) by mouth once daily. 90 tablet 3     ondansetron (ZOFRAN-ODT) 4 MG TbDL Take 2 tablets (8 mg total) by mouth every 8 (eight) hours as needed (nausea vomiting). 30 tablet 0     oxyCODONE-acetaminophen (PERCOCET) 5-325 mg per tablet Take 1 tablet by mouth every 8 (eight) hours as needed for Pain. 20 tablet 0     polyethylene glycol (GOLYTELY) 236-22.74-6.74 -5.86 gram suspension Take as instructed by Endoscopy nurse  8000 mL 0     promethazine (PHENERGAN) 12.5 MG Tab Take 1 tablet (12.5 mg total) by mouth every 6 (six) hours as needed (nausea and vomiting). (Patient not taking: Reported on 2/28/2024) 30 tablet 3     sevelamer carbonate (RENVELA) 800 mg Tab Take 1 tablet (800 mg total) by mouth 3 (three) times daily with meals. 270 tablet 3          Physical Exam:    Vital Signs: There were no vitals filed for this visit.    Gen: NAD, lying comfortably  HENT: NCAT, oropharynx clear  Eyes: anicteric sclerae, EOMI grossly  Neck: supple, no visible masses/goiter  Cardiac: RRR  Lungs: non-labored breathing  Abd: soft, NT/ND, normoactive BS  Ext: no LE edema, warm, well perfused  Skin: skin intact on exposed body parts, no visible rashes, lesions  Neuro: A&Ox4, neuro exam grossly intact, moves all extremities  Psych: appropriate mood, affect        Labs:  Lab Results   Component Value  Date    WBC 4.14 03/14/2024    HGB 11.4 (L) 03/14/2024    HCT 34.8 (L) 03/14/2024     03/14/2024    CHOL 177 02/22/2023    TRIG 56 02/22/2023    HDL 41 02/22/2023    ALT 26 02/26/2024    AST 43 (H) 02/26/2024     (L) 03/14/2024    K 3.3 (L) 03/25/2024    CL 94 (L) 03/14/2024    CREATININE 6.0 (H) 03/14/2024    BUN 28 (H) 03/14/2024    CO2 27 03/14/2024    TSH 1.780 02/09/2022    PSA 1.6 02/23/2022    INR 1.1 02/26/2024    HGBA1C 5.1 02/22/2023       Plan:  Colonoscopy for a personal history of colon polyps, abnormal PET scan    I have explained the risks and benefits of endoscopy procedures to the patient including but not limited to bleeding, perforation, infection, and death.  The patient was asked if they understand and allowed to ask any further questions to their satisfaction.    Roopa Pérez MD

## 2024-04-15 ENCOUNTER — TELEPHONE (OUTPATIENT)
Dept: HEMATOLOGY/ONCOLOGY | Facility: CLINIC | Age: 59
End: 2024-04-15
Payer: COMMERCIAL

## 2024-04-15 ENCOUNTER — HOSPITAL ENCOUNTER (INPATIENT)
Facility: HOSPITAL | Age: 59
LOS: 3 days | Discharge: HOME | DRG: 919 | End: 2024-04-18
Attending: EMERGENCY MEDICINE | Admitting: INTERNAL MEDICINE
Payer: COMMERCIAL

## 2024-04-15 ENCOUNTER — TELEPHONE (OUTPATIENT)
Dept: GASTROENTEROLOGY | Facility: CLINIC | Age: 59
End: 2024-04-15
Payer: COMMERCIAL

## 2024-04-15 ENCOUNTER — OFFICE VISIT (OUTPATIENT)
Dept: HEMATOLOGY/ONCOLOGY | Facility: CLINIC | Age: 59
End: 2024-04-15
Payer: COMMERCIAL

## 2024-04-15 VITALS
SYSTOLIC BLOOD PRESSURE: 136 MMHG | WEIGHT: 198.25 LBS | DIASTOLIC BLOOD PRESSURE: 64 MMHG | HEIGHT: 74 IN | BODY MASS INDEX: 25.44 KG/M2 | HEART RATE: 89 BPM | RESPIRATION RATE: 18 BRPM | OXYGEN SATURATION: 98 % | TEMPERATURE: 98 F

## 2024-04-15 DIAGNOSIS — I10 PRIMARY HYPERTENSION: ICD-10-CM

## 2024-04-15 DIAGNOSIS — M89.9 LYTIC BONE LESIONS ON XRAY: ICD-10-CM

## 2024-04-15 DIAGNOSIS — R91.8 PULMONARY NODULES/LESIONS, MULTIPLE: ICD-10-CM

## 2024-04-15 DIAGNOSIS — K92.2 GI BLEED: ICD-10-CM

## 2024-04-15 DIAGNOSIS — S34.111D: ICD-10-CM

## 2024-04-15 DIAGNOSIS — R91.8 ABNORMAL FINDINGS ON DIAGNOSTIC IMAGING OF LUNG: ICD-10-CM

## 2024-04-15 DIAGNOSIS — R93.7 ABNORMAL MRI, LUMBAR SPINE: ICD-10-CM

## 2024-04-15 DIAGNOSIS — D73.89 SPLENIC LESION: ICD-10-CM

## 2024-04-15 DIAGNOSIS — R29.898 WEAKNESS OF EXTREMITY: ICD-10-CM

## 2024-04-15 DIAGNOSIS — R06.6 INTRACTABLE HICCUPS: ICD-10-CM

## 2024-04-15 DIAGNOSIS — D62 ACUTE BLOOD LOSS ANEMIA: ICD-10-CM

## 2024-04-15 DIAGNOSIS — K76.9 LIVER LESION: ICD-10-CM

## 2024-04-15 DIAGNOSIS — R42 DIZZINESS: ICD-10-CM

## 2024-04-15 DIAGNOSIS — R07.9 CHEST PAIN: ICD-10-CM

## 2024-04-15 DIAGNOSIS — R59.1 DIFFUSE LYMPHADENOPATHY: Primary | ICD-10-CM

## 2024-04-15 DIAGNOSIS — K92.1 HEMATOCHEZIA: Primary | ICD-10-CM

## 2024-04-15 DIAGNOSIS — N18.5 TYPE 2 DIABETES MELLITUS WITH STAGE 5 CHRONIC KIDNEY DISEASE: ICD-10-CM

## 2024-04-15 DIAGNOSIS — E11.22 TYPE 2 DIABETES MELLITUS WITH STAGE 5 CHRONIC KIDNEY DISEASE: ICD-10-CM

## 2024-04-15 PROBLEM — I16.1 HYPERTENSIVE EMERGENCY: Status: RESOLVED | Noted: 2023-12-12 | Resolved: 2024-04-15

## 2024-04-15 PROBLEM — C78.00 METASTASIS TO LUNG: Status: RESOLVED | Noted: 2024-01-24 | Resolved: 2024-04-15

## 2024-04-15 PROBLEM — C78.7 METASTASIS TO LIVER: Status: RESOLVED | Noted: 2024-01-24 | Resolved: 2024-04-15

## 2024-04-15 PROBLEM — C79.51 METASTASIS TO BONE: Status: RESOLVED | Noted: 2024-01-24 | Resolved: 2024-04-15

## 2024-04-15 PROBLEM — E87.6 HYPOKALEMIA: Status: ACTIVE | Noted: 2024-04-15

## 2024-04-15 LAB
ABO + RH BLD: NORMAL
ALBUMIN SERPL BCP-MCNC: 2.4 G/DL (ref 3.5–5.2)
ALP SERPL-CCNC: 435 U/L (ref 55–135)
ALT SERPL W/O P-5'-P-CCNC: 26 U/L (ref 10–44)
ANION GAP SERPL CALC-SCNC: 8 MMOL/L (ref 8–16)
AST SERPL-CCNC: 37 U/L (ref 10–40)
BASOPHILS # BLD AUTO: 0.03 K/UL (ref 0–0.2)
BASOPHILS # BLD AUTO: 0.04 K/UL (ref 0–0.2)
BASOPHILS NFR BLD: 0.8 % (ref 0–1.9)
BASOPHILS NFR BLD: 1 % (ref 0–1.9)
BILIRUB SERPL-MCNC: 0.9 MG/DL (ref 0.1–1)
BLD GP AB SCN CELLS X3 SERPL QL: NORMAL
BLD PROD TYP BPU: NORMAL
BLOOD UNIT EXPIRATION DATE: NORMAL
BLOOD UNIT TYPE CODE: 5100
BLOOD UNIT TYPE: NORMAL
BUN SERPL-MCNC: 32 MG/DL (ref 6–20)
CALCIUM SERPL-MCNC: 8.4 MG/DL (ref 8.7–10.5)
CHLORIDE SERPL-SCNC: 96 MMOL/L (ref 95–110)
CO2 SERPL-SCNC: 24 MMOL/L (ref 23–29)
CODING SYSTEM: NORMAL
CREAT SERPL-MCNC: 5.1 MG/DL (ref 0.5–1.4)
CROSSMATCH INTERPRETATION: NORMAL
DIFFERENTIAL METHOD BLD: ABNORMAL
DIFFERENTIAL METHOD BLD: ABNORMAL
DISPENSE STATUS: NORMAL
EOSINOPHIL # BLD AUTO: 0.1 K/UL (ref 0–0.5)
EOSINOPHIL # BLD AUTO: 0.1 K/UL (ref 0–0.5)
EOSINOPHIL NFR BLD: 2.8 % (ref 0–8)
EOSINOPHIL NFR BLD: 3.3 % (ref 0–8)
ERYTHROCYTE [DISTWIDTH] IN BLOOD BY AUTOMATED COUNT: 15.3 % (ref 11.5–14.5)
ERYTHROCYTE [DISTWIDTH] IN BLOOD BY AUTOMATED COUNT: 15.5 % (ref 11.5–14.5)
EST. GFR  (NO RACE VARIABLE): 12.2 ML/MIN/1.73 M^2
GLUCOSE SERPL-MCNC: 81 MG/DL (ref 70–110)
HCT VFR BLD AUTO: 22.4 % (ref 40–54)
HCT VFR BLD AUTO: 23.2 % (ref 40–54)
HGB BLD-MCNC: 7.3 G/DL (ref 14–18)
HGB BLD-MCNC: 7.5 G/DL (ref 14–18)
IMM GRANULOCYTES # BLD AUTO: 0.01 K/UL (ref 0–0.04)
IMM GRANULOCYTES # BLD AUTO: 0.02 K/UL (ref 0–0.04)
IMM GRANULOCYTES NFR BLD AUTO: 0.3 % (ref 0–0.5)
IMM GRANULOCYTES NFR BLD AUTO: 0.5 % (ref 0–0.5)
LYMPHOCYTES # BLD AUTO: 0.5 K/UL (ref 1–4.8)
LYMPHOCYTES # BLD AUTO: 0.6 K/UL (ref 1–4.8)
LYMPHOCYTES NFR BLD: 14.3 % (ref 18–48)
LYMPHOCYTES NFR BLD: 14.9 % (ref 18–48)
MAGNESIUM SERPL-MCNC: 1.8 MG/DL (ref 1.6–2.6)
MCH RBC QN AUTO: 29.5 PG (ref 27–31)
MCH RBC QN AUTO: 29.9 PG (ref 27–31)
MCHC RBC AUTO-ENTMCNC: 32.3 G/DL (ref 32–36)
MCHC RBC AUTO-ENTMCNC: 32.6 G/DL (ref 32–36)
MCV RBC AUTO: 91 FL (ref 82–98)
MCV RBC AUTO: 92 FL (ref 82–98)
MONOCYTES # BLD AUTO: 0.4 K/UL (ref 0.3–1)
MONOCYTES # BLD AUTO: 0.4 K/UL (ref 0.3–1)
MONOCYTES NFR BLD: 10.3 % (ref 4–15)
MONOCYTES NFR BLD: 9.9 % (ref 4–15)
NEUTROPHILS # BLD AUTO: 2.6 K/UL (ref 1.8–7.7)
NEUTROPHILS # BLD AUTO: 2.8 K/UL (ref 1.8–7.7)
NEUTROPHILS NFR BLD: 70.6 % (ref 38–73)
NEUTROPHILS NFR BLD: 71.3 % (ref 38–73)
NRBC BLD-RTO: 0 /100 WBC
NRBC BLD-RTO: 0 /100 WBC
NUM UNITS TRANS PACKED RBC: NORMAL
OHS QRS DURATION: 80 MS
OHS QTC CALCULATION: 429 MS
PLATELET # BLD AUTO: 149 K/UL (ref 150–450)
PLATELET # BLD AUTO: 183 K/UL (ref 150–450)
PMV BLD AUTO: 10.3 FL (ref 9.2–12.9)
PMV BLD AUTO: 10.9 FL (ref 9.2–12.9)
POTASSIUM SERPL-SCNC: 3.3 MMOL/L (ref 3.5–5.1)
PROT SERPL-MCNC: 5.8 G/DL (ref 6–8.4)
RBC # BLD AUTO: 2.44 M/UL (ref 4.6–6.2)
RBC # BLD AUTO: 2.54 M/UL (ref 4.6–6.2)
SODIUM SERPL-SCNC: 128 MMOL/L (ref 136–145)
SPECIMEN OUTDATE: NORMAL
WBC # BLD AUTO: 3.62 K/UL (ref 3.9–12.7)
WBC # BLD AUTO: 3.99 K/UL (ref 3.9–12.7)

## 2024-04-15 PROCEDURE — 83735 ASSAY OF MAGNESIUM: CPT

## 2024-04-15 PROCEDURE — 36415 COLL VENOUS BLD VENIPUNCTURE: CPT

## 2024-04-15 PROCEDURE — P9011 BLOOD SPLIT UNIT: HCPCS | Performed by: EMERGENCY MEDICINE

## 2024-04-15 PROCEDURE — 99999 PR PBB SHADOW E&M-EST. PATIENT-LVL V: CPT | Mod: PBBFAC,,, | Performed by: INTERNAL MEDICINE

## 2024-04-15 PROCEDURE — 21400001 HC TELEMETRY ROOM

## 2024-04-15 PROCEDURE — 25000003 PHARM REV CODE 250: Performed by: INTERNAL MEDICINE

## 2024-04-15 PROCEDURE — 80053 COMPREHEN METABOLIC PANEL: CPT | Mod: 91 | Performed by: EMERGENCY MEDICINE

## 2024-04-15 PROCEDURE — 3075F SYST BP GE 130 - 139MM HG: CPT | Mod: CPTII,S$GLB,, | Performed by: INTERNAL MEDICINE

## 2024-04-15 PROCEDURE — 99285 EMERGENCY DEPT VISIT HI MDM: CPT | Mod: 25

## 2024-04-15 PROCEDURE — 25000003 PHARM REV CODE 250

## 2024-04-15 PROCEDURE — 86901 BLOOD TYPING SEROLOGIC RH(D): CPT | Performed by: EMERGENCY MEDICINE

## 2024-04-15 PROCEDURE — 63600175 PHARM REV CODE 636 W HCPCS

## 2024-04-15 PROCEDURE — 36430 TRANSFUSION BLD/BLD COMPNT: CPT

## 2024-04-15 PROCEDURE — 86920 COMPATIBILITY TEST SPIN: CPT | Performed by: EMERGENCY MEDICINE

## 2024-04-15 PROCEDURE — 86985 SPLIT BLOOD OR PRODUCTS: CPT | Performed by: EMERGENCY MEDICINE

## 2024-04-15 PROCEDURE — 99205 OFFICE O/P NEW HI 60 MIN: CPT | Mod: S$GLB,,, | Performed by: INTERNAL MEDICINE

## 2024-04-15 PROCEDURE — 25000003 PHARM REV CODE 250: Performed by: STUDENT IN AN ORGANIZED HEALTH CARE EDUCATION/TRAINING PROGRAM

## 2024-04-15 PROCEDURE — 30233N1 TRANSFUSION OF NONAUTOLOGOUS RED BLOOD CELLS INTO PERIPHERAL VEIN, PERCUTANEOUS APPROACH: ICD-10-PCS | Performed by: EMERGENCY MEDICINE

## 2024-04-15 PROCEDURE — 3078F DIAST BP <80 MM HG: CPT | Mod: CPTII,S$GLB,, | Performed by: INTERNAL MEDICINE

## 2024-04-15 PROCEDURE — 85025 COMPLETE CBC W/AUTO DIFF WBC: CPT | Performed by: EMERGENCY MEDICINE

## 2024-04-15 PROCEDURE — C9113 INJ PANTOPRAZOLE SODIUM, VIA: HCPCS

## 2024-04-15 PROCEDURE — 11000001 HC ACUTE MED/SURG PRIVATE ROOM

## 2024-04-15 PROCEDURE — 3008F BODY MASS INDEX DOCD: CPT | Mod: CPTII,S$GLB,, | Performed by: INTERNAL MEDICINE

## 2024-04-15 PROCEDURE — 1159F MED LIST DOCD IN RCRD: CPT | Mod: CPTII,S$GLB,, | Performed by: INTERNAL MEDICINE

## 2024-04-15 PROCEDURE — 93005 ELECTROCARDIOGRAM TRACING: CPT

## 2024-04-15 PROCEDURE — 1160F RVW MEDS BY RX/DR IN RCRD: CPT | Mod: CPTII,S$GLB,, | Performed by: INTERNAL MEDICINE

## 2024-04-15 PROCEDURE — 85025 COMPLETE CBC W/AUTO DIFF WBC: CPT | Mod: 91

## 2024-04-15 PROCEDURE — 93010 ELECTROCARDIOGRAM REPORT: CPT | Mod: ,,, | Performed by: INTERNAL MEDICINE

## 2024-04-15 RX ORDER — NALOXONE HCL 0.4 MG/ML
0.02 VIAL (ML) INJECTION
Status: DISCONTINUED | OUTPATIENT
Start: 2024-04-15 | End: 2024-04-18 | Stop reason: HOSPADM

## 2024-04-15 RX ORDER — POTASSIUM CHLORIDE 7.45 MG/ML
10 INJECTION INTRAVENOUS
Status: DISCONTINUED | OUTPATIENT
Start: 2024-04-15 | End: 2024-04-15

## 2024-04-15 RX ORDER — HYDRALAZINE HYDROCHLORIDE 50 MG/1
100 TABLET, FILM COATED ORAL EVERY 8 HOURS PRN
Status: DISCONTINUED | OUTPATIENT
Start: 2024-04-15 | End: 2024-04-16

## 2024-04-15 RX ORDER — IBUPROFEN 200 MG
24 TABLET ORAL
Status: DISCONTINUED | OUTPATIENT
Start: 2024-04-15 | End: 2024-04-18 | Stop reason: HOSPADM

## 2024-04-15 RX ORDER — POLYETHYLENE GLYCOL 3350, SODIUM SULFATE ANHYDROUS, SODIUM BICARBONATE, SODIUM CHLORIDE, POTASSIUM CHLORIDE 236; 22.74; 6.74; 5.86; 2.97 G/4L; G/4L; G/4L; G/4L; G/4L
4000 POWDER, FOR SOLUTION ORAL ONCE
Status: COMPLETED | OUTPATIENT
Start: 2024-04-15 | End: 2024-04-15

## 2024-04-15 RX ORDER — PANTOPRAZOLE SODIUM 40 MG/10ML
80 INJECTION, POWDER, LYOPHILIZED, FOR SOLUTION INTRAVENOUS ONCE
Status: COMPLETED | OUTPATIENT
Start: 2024-04-15 | End: 2024-04-15

## 2024-04-15 RX ORDER — IBUPROFEN 200 MG
16 TABLET ORAL
Status: DISCONTINUED | OUTPATIENT
Start: 2024-04-15 | End: 2024-04-18 | Stop reason: HOSPADM

## 2024-04-15 RX ORDER — IMIPRAMINE HYDROCHLORIDE 25 MG/1
25 TABLET, FILM COATED ORAL NIGHTLY
Status: DISCONTINUED | OUTPATIENT
Start: 2024-04-15 | End: 2024-04-17

## 2024-04-15 RX ORDER — PROCHLORPERAZINE MALEATE 5 MG
5 TABLET ORAL 3 TIMES DAILY PRN
Status: DISCONTINUED | OUTPATIENT
Start: 2024-04-16 | End: 2024-04-18 | Stop reason: HOSPADM

## 2024-04-15 RX ORDER — SODIUM CHLORIDE 0.9 % (FLUSH) 0.9 %
10 SYRINGE (ML) INJECTION EVERY 12 HOURS PRN
Status: DISCONTINUED | OUTPATIENT
Start: 2024-04-15 | End: 2024-04-18 | Stop reason: HOSPADM

## 2024-04-15 RX ORDER — HYDRALAZINE HYDROCHLORIDE 50 MG/1
50 TABLET, FILM COATED ORAL EVERY 8 HOURS PRN
Status: DISCONTINUED | OUTPATIENT
Start: 2024-04-15 | End: 2024-04-15

## 2024-04-15 RX ORDER — DICYCLOMINE HYDROCHLORIDE 10 MG/1
10 CAPSULE ORAL ONCE
Status: COMPLETED | OUTPATIENT
Start: 2024-04-15 | End: 2024-04-15

## 2024-04-15 RX ORDER — POTASSIUM CHLORIDE 20 MEQ/1
40 TABLET, EXTENDED RELEASE ORAL
Status: ACTIVE | OUTPATIENT
Start: 2024-04-15 | End: 2024-04-15

## 2024-04-15 RX ORDER — GLUCAGON 1 MG
1 KIT INJECTION
Status: DISCONTINUED | OUTPATIENT
Start: 2024-04-15 | End: 2024-04-18 | Stop reason: HOSPADM

## 2024-04-15 RX ADMIN — HYDRALAZINE HYDROCHLORIDE 100 MG: 50 TABLET ORAL at 11:04

## 2024-04-15 RX ADMIN — POTASSIUM CHLORIDE 40 MEQ: 1500 TABLET, EXTENDED RELEASE ORAL at 07:04

## 2024-04-15 RX ADMIN — POLYETHYLENE GLYCOL 3350, SODIUM SULFATE ANHYDROUS, SODIUM BICARBONATE, SODIUM CHLORIDE, POTASSIUM CHLORIDE 4000 ML: 236; 22.74; 6.74; 5.86; 2.97 POWDER, FOR SOLUTION ORAL at 07:04

## 2024-04-15 RX ADMIN — PANTOPRAZOLE SODIUM 80 MG: 40 INJECTION, POWDER, FOR SOLUTION INTRAVENOUS at 07:04

## 2024-04-15 RX ADMIN — DICYCLOMINE HYDROCHLORIDE 10 MG: 10 CAPSULE ORAL at 06:04

## 2024-04-15 NOTE — ASSESSMENT & PLAN NOTE
Chronic, uncontrolled. Latest blood pressure and vitals reviewed-     Temp:  [97.9 °F (36.6 °C)-98.3 °F (36.8 °C)]   Pulse:  [70-89]   Resp:  [13-18]   BP: (131-183)/(64-84)   SpO2:  [98 %-100 %] .   Home meds for hypertension were reviewed and noted below.   Hypertension Medications               hydrALAZINE (APRESOLINE) 100 MG tablet Take 1 tablet (100 mg total) by mouth 3 (three) times daily.    NIFEdipine (PROCARDIA XL) 90 MG (OSM) 24 hr tablet Take 1 tablet (90 mg total) by mouth once daily.            While in the hospital, will manage blood pressure as follows; Will hold HTN meds in the setting of bleeding. If BP is above 180/110 hydralazine PO will be restarted.      Will utilize p.r.n. blood pressure medication only if patient's blood pressure greater than 180/110 and he develops symptoms such as worsening chest pain or shortness of breath.

## 2024-04-15 NOTE — ASSESSMENT & PLAN NOTE
Pt presenting to the ED complaining of multiple bloody bowel movement. Bloody bowel movement started on 04/12/24.  One week ago he had polypectomy on colonoscopy.  He is continued to have 3-4 maroon, thick bowel movements every day. Hgb dropped from 11.5 to 7.5. Pt reports feeling weak and dizzy since Sunday. Also reports diarrhea for which he took imodium and pepto bismol. Last colonoscopy done on 04/08/24 showed 5 polyps of which 4 were removed.     Plan  - One unit of PRBC ordered  - GI consulted, appreciate recs  - Clear liquid diet   - NPO at midnight for possible colonoscopy tomorrow   - Golytely prep to start at 6pm, to be completed within 4 hours if possible  - Monitor Hgb q4hrs  - Transfuse to keep Hgb >7, plts >50  - Hold anticoagulants if safe to do so per primary team  - If becomes hemodynamically unstable with associated large volume hematochezia, recommend STAT CTA and IR embolization if positive

## 2024-04-15 NOTE — ASSESSMENT & PLAN NOTE
Pt with ESRD on hemodialysis [facility]ThS. Creatine elevated at [facility] for now. BMP reviewed- noted Estimated Creatinine Clearance: 18.4 mL/min (A) (based on SCr of 4.9 mg/dL (H)). according to latest data. Based on current GFR, CKD stage is stage 5 - GFR < 15.      Nephrology consulted to arrange inpatient hemodialysis  Monitor UOP, serial BMP and adjust therapy as needed. Renally dose meds. Avoid nephrotoxic medications and procedures.

## 2024-04-15 NOTE — PROGRESS NOTES
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 RTC in 4 weeks   Follow up with MARIA ELENA    Infusion scheduling note    Injection scheduling note    Labs   Scheduling:  Preferred lab:  Lab interval:  Please schedule all labs ordered today for today   Imaging    Pharmacy appointment    Other referrals         Please help patient schedule pulmonary and neurology appointments         MDM includes:    - Acute or chronic illness or injury that poses a threat to life or bodily function  - Consideration and discussion of significant complications based on comorbidities  - Review of prior external notes from unique source and review of diagnostic tests and information  - Independent review and explanation of 3+ results from unique tests   - Discussion of management and ordering 3+ unique tests   - Extensive discussion of treatment and management including consideration of possible diagnoses and management options  - Prescription drug management  - Drug therapy requiring intensive monitoring for toxicity    - Patient seen in diagnosis clinic.   - Overall, I discussed the diagnosis, history, stage,  labs/imaging, prognosis, management, and treatment plan as applicable. I reviewed adverse short and long term effects as applicable.   - Informed patient if symptoms are getting worse that it is their responsibility to call the clinic and schedule follow up sooner than stated follow up. Also informed patient if they do not hear from the appointment center in 2-5 business days for their referrals, the patient must call the Oncology clinic so we can follow up on procedures or referral scheduling. Patient was fully informed of current medical plan, all questions were answered and patient verbalized understanding. No further questions.   - Face to Face Visit time I spent with the patient: 60 minutes of total time spent on the encounter, including counseling patient and/or coordinating care, which includes face to face time and non-face to face time preparing to see the patient (eg, review of tests), Obtaining and/or reviewing separately obtained history, Documenting clinical information in the electronic or other health record, Independently interpreting results (not separately reported) and communicating results to the patient/family/caregiver, or Care coordination (not separately reported).       Signed   Brianna Mendoza MD  [facility]

## 2024-04-15 NOTE — HPI
Mr. Mcfadden is a 58 yo M with ESRD on HD TThS, HTN, T2DM with neuropathy and gastroparesis who presented to the ED after being sent from Heme/onc clinic due to bloody bowel movement. Symptoms started last Friday 04/12/24 when he started having bloody diarrhea and started feeling lightheaded and weak. He treat the symptoms with imodium and pepto bismol. Pt also reports loss of appetite for the past year, and loss of 100lbs.  Denies headaches, visual changes, SOB, cough, chest pain, palpitation, nausea, vomiting, abdominal pain, constipation, urinary frequency or any weakness. Per chart review, the pt had a colonoscopy on 04/08/24, five polyps were identified of which 4 were removed and sent for pathology. Pt is being work up for possible metastatic cancer of unknown primary source. PET scan done in Jan 24 showed widespread hypermetabolic adenopathy, innumerable foci within the liver, splenic lesions, bone lesions, uptake in the transverse colon, and diffuse pulmonary micro-nodules  At the ED the pt was afebrile and hypertensive with BP of 183/84 and HR 74. Labwork remarkable for Hgb dropping from 11.5 to 7.5, WBC 3.62. CMP remarkable for hypokalemia of 3.0, Creatinine 4.9. ALP elevated at 439. One unit of PRBC ordered GI services consulted and pt was admitted to hospital medicine for further management of GI bleed.

## 2024-04-15 NOTE — Clinical Note
I was hoping to get your input on Mr. Mcfadden who I met in diagnosis clinic today. His PET from Jan 2024 looks very concerning (hypermetabolic nodes, liver, spleen, bone lesions) and diffuse pulmonary nodules/tree in bud opacities. You did his bronch/ebus in march w/ no evidence of malignancy; he's also had a bone biopsy that is negative for cancer.   Wanted to see your thoughts on if this could be sarcoid and who I can plug him in with to further evaluate. Thanks! -Radha

## 2024-04-15 NOTE — H&P
[facility] Emergency Dept  Hospital Medicine  History & Physical    Patient Name: Catalino Mcfadden  MRN: [id number]  Patient Class: IP- Inpatient  Admission Date: 4/15/2024  Attending Physician: Tonie Lorenz MD   Primary Care Provider: Lizbeth Primary Doctor         Patient information was obtained from patient and ER records.     Subjective:     Principal Problem:GI bleed    Chief Complaint:   Chief Complaint   Patient presents with    Rectal Bleeding     Pt reports rectal bleeding x1 week + had colonoscopy on 4/8/24. Endorsing dizziness. Told to come to ED for further evaluation. Abnormal H+H levels.        HPI: Mr. Mcfadden is a 58 yo M with ESRD on HD TThS, HTN, T2DM with neuropathy and gastroparesis who presented to the ED after being sent from Heme/onc clinic due to bloody bowel movement. Symptoms started last Friday 04/12/24 when he started having bloody diarrhea and started feeling lightheaded and weak. He treat the symptoms with imodium and pepto bismol. Pt also reports loss of appetite for the past year, and loss of 100lbs.  Denies headaches, visual changes, SOB, cough, chest pain, palpitation, nausea, vomiting, abdominal pain, constipation, urinary frequency or any weakness. Per chart review, the pt had a colonoscopy on 04/08/24, five polyps were identified of which 4 were removed and sent for pathology. Pt is being work up for possible metastatic cancer of unknown primary source. PET scan done in Jan 24 showed widespread hypermetabolic adenopathy, innumerable foci within the liver, splenic lesions, bone lesions, uptake in the transverse colon, and diffuse pulmonary micro-nodules  At the ED the pt was afebrile and hypertensive with BP of 183/84 and HR 74. Labwork remarkable for Hgb dropping from 11.5 to 7.5, WBC 3.62. CMP remarkable for hypokalemia of 3.0, Creatinine 4.9. ALP elevated at 439. One unit of PRBC ordered GI services consulted and pt was admitted to hospital medicine for further management of GI  bleed.      Past Medical History:   Diagnosis Date    Cancer     Diabetes mellitus, type 2     Diabetic foot ulcer associated with diabetes mellitus due to underlying condition 07/16/2020    ESRD on hemodialysis     Hyperlipidemia     Hypertension     Obese        Past Surgical History:   Procedure Laterality Date    AV FISTULA PLACEMENT Left 07/06/2023    Procedure: CREATION, AV FISTULA;  Surgeon: Daniel Poon MD;  Location: [facility] OR;  Service: Vascular;  Laterality: Left;  RN PREOP 6/28/2023  LABS DAY OF SURGERY    BONE BIOPSY Left 07/17/2020    Procedure: BIOPSY, BONE;  Surgeon: Verona Whitmore DPM;  Location: [facility] OR;  Service: Podiatry;  Laterality: Left;    CERVICAL DISC SURGERY  07/11/2016    COLONOSCOPY N/A 3/25/2024    Procedure: COLONOSCOPY;  Surgeon: Roopa Pérez MD;  Location: [facility] (4TH FLR);  Service: Endoscopy;  Laterality: N/A;  Ref By: Dr. Pérez   constipation prep  Diaylsis Patient Lab has been ordered  3/20-precall complete-MS    COLONOSCOPY N/A 4/8/2024    Procedure: COLONOSCOPY;  Surgeon: Roopa Pérez MD;  Location: [facility] (2ND FLR);  Service: Endoscopy;  Laterality: N/A;  Prep instructions sent via portal/given to pt in clinic  pt had to be r/s at  request  Dialysis Tues,Thurs,Sat-dw  Peg Prep-dw  4/2/24- Labs - dialysis /poor prep on 3/26 - PEG x 2 - extended prep /LVM for precall - ERW  4/5-LVM for precall-KPvt    DEBRIDEMENT Left 07/17/2020    Procedure: DEBRIDEMENT;  Surgeon: Verona Whitmore DPM;  Location: [facility] OR;  Service: Podiatry;  Laterality: Left;    DEBRIDEMENT OF FOOT Right     toes & plantar surface    ENDOBRONCHIAL ULTRASOUND N/A 3/1/2024    Procedure: ENDOBRONCHIAL ULTRASOUND (EBUS);  Surgeon: Francisco Fleming MD;  Location: [facility] 2ND FLR;  Service: Pulmonary;  Laterality: N/A;    ESOPHAGEAL MANOMETRY WITH MEASUREMENT OF IMPEDANCE N/A 03/27/2023    Procedure: MANOMETRY, ESOPHAGUS, WITH IMPEDANCE MEASUREMENT;  Surgeon: Roopa Pérez,  MD;  Location: [facility] ENDO (4TH FLR);  Service: Endoscopy;  Laterality: N/A;  Belching and rumination protocol please  instructions via portal - sm    ESOPHAGOGASTRODUODENOSCOPY N/A 12/16/2022    Procedure: EGD (ESOPHAGOGASTRODUODENOSCOPY);  Surgeon: Eren Francois MD;  Location: [facility] ENDO;  Service: Endoscopy;  Laterality: N/A;  Pt. on Dialysis. K+ ordered prior to procedure.EC    ESOPHAGOGASTRODUODENOSCOPY N/A 12/5/2023    Procedure: EGD (ESOPHAGOGASTRODUODENOSCOPY);  Surgeon: Kash Johnston MD;  Location: [facility] ENDO;  Service: Endoscopy;  Laterality: N/A;    FRACTURE SURGERY Right     medial ankle, metal plate present    INJECTION OF ANESTHETIC AGENT AROUND NERVE Right 2/2/2024    Procedure: BLOCK, NERVE STELLATE GANGLION *HOLD ASPIRIN 5 DAYS PRIOR*;  Surgeon: Gokul Gonzalez MD;  Location: [facility] PAIN MGT;  Service: Pain Management;  Laterality: Right;  [phone]    INSERTION OF TUNNELED CENTRAL VENOUS CATHETER (CVC) WITH SUBCUTANEOUS PORT N/A 06/11/2021    Procedure: TUNNEL CATH INSERTION WITHOUT PORT;  Surgeon: Dosc Diagnostic Provider;  Location: [facility] OR;  Service: Radiology;  Laterality: N/A;  11AM START---PHONE PREOP 6/10/21---COVID POSTIVE ON 7/2020---NO S/S    left leg orthopedic surgery Left     REVISION OF ARTERIOVENOUS FISTULA Left 3/14/2024    Procedure: REVISION, AV FISTULA;  Surgeon: Daniel Poon MD;  Location: [facility] OR;  Service: Vascular;  Laterality: Left;  RN preop 3/6/2024----NEED ORDERS    UPPER GASTROINTESTINAL ENDOSCOPY         Review of patient's allergies indicates:  No Known Allergies    Current Facility-Administered Medications   Medication Dose Route Frequency Provider Last Rate Last Admin    dextrose 10% bolus 125 mL 125 mL  12.5 g Intravenous PRN Camron Gomez MD        dextrose 10% bolus 250 mL 250 mL  25 g Intravenous PRN Camron Gomez MD        dicyclomine capsule 10 mg  10 mg Oral Once Tonie Lorenz MD        glucagon (human  recombinant) injection 1 mg  1 mg Intramuscular PRN Camron Gomez MD        glucose chewable tablet 16 g  16 g Oral PRN Camron Gomez MD        glucose chewable tablet 24 g  24 g Oral PRN Camron Gomez MD        imipramine tablet 25 mg  25 mg Oral QHS Camron Gomez MD        naloxone 0.4 mg/mL injection 0.02 mg  0.02 mg Intravenous PRN Camron Gomez MD        potassium chloride 10 mEq in 100 mL IVPB  10 mEq Intravenous Q1H Camron Gomez MD        sodium chloride 0.9% flush 10 mL  10 mL Intravenous Q12H PRN Camron Gomez MD         Current Outpatient Medications   Medication Sig Dispense Refill    hydrALAZINE (APRESOLINE) 100 MG tablet Take 1 tablet (100 mg total) by mouth 3 (three) times daily. 270 tablet 3    imipramine (TOFRANIL) 25 MG tablet Take 1 tablet (25 mg total) by mouth every evening. 30 tablet 11    methoxy peg-epoetin beta (MIRCERA INJ) Inject 50 mcg into the skin every 28 days.      NIFEdipine (PROCARDIA XL) 90 MG (OSM) 24 hr tablet Take 1 tablet (90 mg total) by mouth once daily. 90 tablet 3    ondansetron (ZOFRAN-ODT) 4 MG TbDL Take 2 tablets (8 mg total) by mouth every 8 (eight) hours as needed (nausea vomiting). 30 tablet 0    oxyCODONE-acetaminophen (PERCOCET) 5-325 mg per tablet Take 1 tablet by mouth every 8 (eight) hours as needed for Pain. 20 tablet 0    polyethylene glycol (GOLYTELY) 236-22.74-6.74 -5.86 gram suspension Take as instructed by Endoscopy nurse  8000 mL 0    sevelamer carbonate (RENVELA) 800 mg Tab Take 1 tablet (800 mg total) by mouth 3 (three) times daily with meals. 270 tablet 3     Family History       Problem Relation (Age of Onset)    Heart disease Father          Tobacco Use    Smoking status: Never    Smokeless tobacco: Never   Substance and Sexual Activity    Alcohol use: Not Currently     Comment: occ rare beer    Drug use: No    Sexual activity: Not  Currently     Review of Systems   Constitutional:  Negative for appetite change, chills, diaphoresis, fatigue and fever.   HENT:  Negative for congestion.    Respiratory:  Negative for cough, shortness of breath and wheezing.    Cardiovascular:  Negative for chest pain, palpitations and leg swelling.   Gastrointestinal:  Positive for blood in stool and diarrhea. Negative for abdominal pain, constipation, nausea and vomiting.   Genitourinary:  Negative for dysuria, flank pain and frequency.   Musculoskeletal:  Negative for arthralgias, joint swelling and myalgias.   Skin:  Negative for pallor and rash.   Neurological:  Positive for weakness and light-headedness. Negative for dizziness and headaches.   Psychiatric/Behavioral:  Negative for agitation. The patient is not nervous/anxious.      Objective:     Vital Signs (Most Recent):  Temp: 98.2 °F (36.8 °C) (04/15/24 1649)  Pulse: 73 (04/15/24 1649)  Resp: 20 (04/15/24 1649)  BP: (!) 165/76 (04/15/24 1649)  SpO2: 97 % (04/15/24 1649) Vital Signs (24h Range):  Temp:  [97.9 °F (36.6 °C)-98.3 °F (36.8 °C)] 98.2 °F (36.8 °C)  Pulse:  [70-89] 73  Resp:  [13-20] 20  SpO2:  [97 %-100 %] 97 %  BP: (131-183)/(64-84) 165/76     Weight: 89.8 kg (198 lb)  Body mass index is 25.77 kg/m².     Physical Exam  Constitutional:       General: He is not in acute distress.  HENT:      Head: Normocephalic and atraumatic.   Eyes:      Extraocular Movements: Extraocular movements intact.      Pupils: Pupils are equal, round, and reactive to light.   Cardiovascular:      Rate and Rhythm: Normal rate and regular rhythm.      Heart sounds: No murmur heard.  Pulmonary:      Effort: Pulmonary effort is normal. No respiratory distress.      Breath sounds: No wheezing or rales.   Abdominal:      General: Abdomen is flat. Bowel sounds are normal. There is no distension.      Palpations: Abdomen is soft.      Tenderness: There is no abdominal tenderness.   Musculoskeletal:         General: No swelling  or tenderness. Normal range of motion.      Cervical back: Normal range of motion.      Right lower leg: No edema.      Left lower leg: No edema.   Lymphadenopathy:      Cervical: No cervical adenopathy.   Skin:     General: Skin is warm and dry.      Coloration: Skin is not jaundiced.      Findings: No bruising.      Comments: Right upper arm AV fistula   Neurological:      General: No focal deficit present.      Mental Status: He is alert and oriented to person, place, and time.              CRANIAL NERVES     CN III, IV, VI   Pupils are equal, round, and reactive to light.       Significant Labs: All pertinent labs within the past 24 hours have been reviewed.    Significant Imaging: I have reviewed all pertinent imaging results/findings within the past 24 hours.  Assessment/Plan:     * GI bleed  Pt presenting to the ED complaining of multiple bloody bowel movement. Bloody bowel movement started on 04/12/24.  One week ago he had polypectomy on colonoscopy.  He is continued to have 3-4 maroon, thick bowel movements every day. Hgb dropped from 11.5 to 7.5. Pt reports feeling weak and dizzy since Sunday. Also reports diarrhea for which he took imodium and pepto bismol. Last colonoscopy done on 04/08/24 showed 5 polyps of which 4 were removed.     Plan  - One unit of PRBC ordered  - GI consulted, appreciate recs  - Clear liquid diet   - NPO at midnight for possible colonoscopy tomorrow   - Golytely prep to start at 6pm, to be completed within 4 hours if possible  - Monitor Hgb q4hrs  - Transfuse to keep Hgb >7, plts >50  - Hold anticoagulants if safe to do so per primary team  - If becomes hemodynamically unstable with associated large volume hematochezia, recommend STAT CTA and IR embolization if positive        Hypokalemia  Patient has hypokalemia which is Acute and currently uncontrolled. Most recent potassium levels reviewed-   Lab Results   Component Value Date    K 3.0 (L) 04/15/2024   . Will continue potassium  "replacement per protocol and recheck repeat levels after replacement completed.     Acute blood loss anemia  Patient's anemia is currently uncontrolled. Has received 1 units of PRBCs on 04/15/24 . Etiology likely d/t acute blood loss which was from GI bleed  Current CBC reviewed-   Lab Results   Component Value Date    HGB 7.3 (L) 04/15/2024    HCT 22.4 (L) 04/15/2024     Monitor serial CBC and transfuse if patient becomes hemodynamically unstable, symptomatic or H/H drops below 7/21.  Please see GI bleed     ESRD on hemodialysis  Pt with ESRD on hemodialysis TThS. Creatine elevated at [facility] for now. BMP reviewed- noted Estimated Creatinine Clearance: 18.4 mL/min (A) (based on SCr of 4.9 mg/dL (H)). according to latest data. Based on current GFR, CKD stage is stage 5 - GFR < 15.      Nephrology consulted to arrange inpatient hemodialysis  Monitor UOP, serial BMP and adjust therapy as needed. Renally dose meds. Avoid nephrotoxic medications and procedures.    Type II diabetes mellitus with neurological manifestations  Patient's FSGs are controlled on current medication regimen.  Last A1c reviewed-   Lab Results   Component Value Date    HGBA1C 5.1 02/22/2023     Most recent fingerstick glucose reviewed- No results for input(s): "POCTGLUCOSE" in the last 24 hours.  Current correctional scale  Low  Decrease anti-hyperglycemic dose as follows-   Antihyperglycemics (From admission, onward)      None          Hold Oral hypoglycemics while patient is in the hospital.  Ordering Imipramine for peripheral neuropathy    HTN (hypertension)  Chronic, uncontrolled. Latest blood pressure and vitals reviewed-     Temp:  [97.9 °F (36.6 °C)-98.3 °F (36.8 °C)]   Pulse:  [70-89]   Resp:  [13-18]   BP: (131-183)/(64-84)   SpO2:  [98 %-100 %] .   Home meds for hypertension were reviewed and noted below.   Hypertension Medications               hydrALAZINE (APRESOLINE) 100 MG tablet Take 1 tablet (100 mg total) by mouth 3 (three) times " daily.    NIFEdipine (PROCARDIA XL) 90 MG (OSM) 24 hr tablet Take 1 tablet (90 mg total) by mouth once daily.            While in the hospital, will manage blood pressure as follows; Will hold HTN meds in the setting of bleeding. If BP is above 180/110 hydralazine PO will be restarted.      Will utilize p.r.n. blood pressure medication only if patient's blood pressure greater than 180/110 and he develops symptoms such as worsening chest pain or shortness of breath.      VTE Risk Mitigation (From admission, onward)           Ordered     Reason for No Pharmacological VTE Prophylaxis  Once        Question:  Reasons:  Answer:  Risk of Bleeding    04/15/24 1532     IP VTE HIGH RISK PATIENT  Once         04/15/24 1532     Place sequential compression device  Until discontinued         04/15/24 1532                                    Camron Louis MD  Department of Hospital Medicine  [facility] - Emergency Dept

## 2024-04-15 NOTE — SUBJECTIVE & OBJECTIVE
Past Medical History:   Diagnosis Date    Cancer     Diabetes mellitus, type 2     Diabetic foot ulcer associated with diabetes mellitus due to underlying condition 07/16/2020    ESRD on hemodialysis     Hyperlipidemia     Hypertension     Obese        Past Surgical History:   Procedure Laterality Date    AV FISTULA PLACEMENT Left 07/06/2023    Procedure: CREATION, AV FISTULA;  Surgeon: Daniel Poon MD;  Location: [facility] OR;  Service: Vascular;  Laterality: Left;  RN PREOP 6/28/2023  LABS DAY OF SURGERY    BONE BIOPSY Left 07/17/2020    Procedure: BIOPSY, BONE;  Surgeon: Verona Whitmore DPM;  Location: [facility] OR;  Service: Podiatry;  Laterality: Left;    CERVICAL DISC SURGERY  07/11/2016    COLONOSCOPY N/A 3/25/2024    Procedure: COLONOSCOPY;  Surgeon: Roopa Pérez MD;  Location: [facility] (4TH FLR);  Service: Endoscopy;  Laterality: N/A;  Ref By: Dr. Pérez   constipation prep  Diaylsis Patient Lab has been ordered  3/20-precall complete-MS    COLONOSCOPY N/A 4/8/2024    Procedure: COLONOSCOPY;  Surgeon: Roopa Pérez MD;  Location: [facility] (2ND FLR);  Service: Endoscopy;  Laterality: N/A;  Prep instructions sent via portal/given to pt in clinic  pt had to be r/s at  request  Dialysis Tues,Thurs,Sat-dw  Peg Prep-dw  4/2/24- Labs - dialysis /poor prep on 3/26 - PEG x 2 - extended prep /LVM for precall - ERW  4/5-LVM for precall-KPvt    DEBRIDEMENT Left 07/17/2020    Procedure: DEBRIDEMENT;  Surgeon: Verona Whitmore DPM;  Location: [facility] OR;  Service: Podiatry;  Laterality: Left;    DEBRIDEMENT OF FOOT Right     toes & plantar surface    ENDOBRONCHIAL ULTRASOUND N/A 3/1/2024    Procedure: ENDOBRONCHIAL ULTRASOUND (EBUS);  Surgeon: Francisco Fleming MD;  Location: [facility] 2ND FLR;  Service: Pulmonary;  Laterality: N/A;    ESOPHAGEAL MANOMETRY WITH MEASUREMENT OF IMPEDANCE N/A 03/27/2023    Procedure: MANOMETRY, ESOPHAGUS, WITH IMPEDANCE MEASUREMENT;  Surgeon: Roopa Pérez MD;   Location: [facility] ENDO (4TH FLR);  Service: Endoscopy;  Laterality: N/A;  Belching and rumination protocol please  instructions via portal - sm    ESOPHAGOGASTRODUODENOSCOPY N/A 12/16/2022    Procedure: EGD (ESOPHAGOGASTRODUODENOSCOPY);  Surgeon: Eren Francois MD;  Location: [facility] ENDO;  Service: Endoscopy;  Laterality: N/A;  Pt. on Dialysis. K+ ordered prior to procedure.EC    ESOPHAGOGASTRODUODENOSCOPY N/A 12/5/2023    Procedure: EGD (ESOPHAGOGASTRODUODENOSCOPY);  Surgeon: Kash Johnston MD;  Location: [facility] ENDO;  Service: Endoscopy;  Laterality: N/A;    FRACTURE SURGERY Right     medial ankle, metal plate present    INJECTION OF ANESTHETIC AGENT AROUND NERVE Right 2/2/2024    Procedure: BLOCK, NERVE STELLATE GANGLION *HOLD ASPIRIN 5 DAYS PRIOR*;  Surgeon: Gokul Gonzalez MD;  Location: [facility] PAIN MGT;  Service: Pain Management;  Laterality: Right;  [phone]    INSERTION OF TUNNELED CENTRAL VENOUS CATHETER (CVC) WITH SUBCUTANEOUS PORT N/A 06/11/2021    Procedure: TUNNEL CATH INSERTION WITHOUT PORT;  Surgeon: Dosc Diagnostic Provider;  Location: [facility] OR;  Service: Radiology;  Laterality: N/A;  11AM START---PHONE PREOP 6/10/21---COVID POSTIVE ON 7/2020---NO S/S    left leg orthopedic surgery Left     REVISION OF ARTERIOVENOUS FISTULA Left 3/14/2024    Procedure: REVISION, AV FISTULA;  Surgeon: Daniel Poon MD;  Location: [facility] OR;  Service: Vascular;  Laterality: Left;  RN preop 3/6/2024----NEED ORDERS    UPPER GASTROINTESTINAL ENDOSCOPY         Review of patient's allergies indicates:  No Known Allergies    Current Facility-Administered Medications   Medication Dose Route Frequency Provider Last Rate Last Admin    dextrose 10% bolus 125 mL 125 mL  12.5 g Intravenous PRN Camron Gomez MD        dextrose 10% bolus 250 mL 250 mL  25 g Intravenous PRN Camron Gomez MD        dicyclomine capsule 10 mg  10 mg Oral Once Tonie Lorenz MD        glucagon (human  recombinant) injection 1 mg  1 mg Intramuscular PRN Camron Gomez MD        glucose chewable tablet 16 g  16 g Oral PRN Camron Gomez MD        glucose chewable tablet 24 g  24 g Oral PRN Camron Gomez MD        imipramine tablet 25 mg  25 mg Oral QHS Camron Gomez MD        naloxone 0.4 mg/mL injection 0.02 mg  0.02 mg Intravenous PRN Camron Gomez MD        potassium chloride 10 mEq in 100 mL IVPB  10 mEq Intravenous Q1H Camron Gomez MD        sodium chloride 0.9% flush 10 mL  10 mL Intravenous Q12H PRN Camron Gomez MD         Current Outpatient Medications   Medication Sig Dispense Refill    hydrALAZINE (APRESOLINE) 100 MG tablet Take 1 tablet (100 mg total) by mouth 3 (three) times daily. 270 tablet 3    imipramine (TOFRANIL) 25 MG tablet Take 1 tablet (25 mg total) by mouth every evening. 30 tablet 11    methoxy peg-epoetin beta (MIRCERA INJ) Inject 50 mcg into the skin every 28 days.      NIFEdipine (PROCARDIA XL) 90 MG (OSM) 24 hr tablet Take 1 tablet (90 mg total) by mouth once daily. 90 tablet 3    ondansetron (ZOFRAN-ODT) 4 MG TbDL Take 2 tablets (8 mg total) by mouth every 8 (eight) hours as needed (nausea vomiting). 30 tablet 0    oxyCODONE-acetaminophen (PERCOCET) 5-325 mg per tablet Take 1 tablet by mouth every 8 (eight) hours as needed for Pain. 20 tablet 0    polyethylene glycol (GOLYTELY) 236-22.74-6.74 -5.86 gram suspension Take as instructed by Endoscopy nurse  8000 mL 0    sevelamer carbonate (RENVELA) 800 mg Tab Take 1 tablet (800 mg total) by mouth 3 (three) times daily with meals. 270 tablet 3     Family History       Problem Relation (Age of Onset)    Heart disease Father          Tobacco Use    Smoking status: Never    Smokeless tobacco: Never   Substance and Sexual Activity    Alcohol use: Not Currently     Comment: occ rare beer    Drug use: No    Sexual activity: Not  Currently     Review of Systems   Constitutional:  Negative for appetite change, chills, diaphoresis, fatigue and fever.   HENT:  Negative for congestion.    Respiratory:  Negative for cough, shortness of breath and wheezing.    Cardiovascular:  Negative for chest pain, palpitations and leg swelling.   Gastrointestinal:  Positive for blood in stool and diarrhea. Negative for abdominal pain, constipation, nausea and vomiting.   Genitourinary:  Negative for dysuria, flank pain and frequency.   Musculoskeletal:  Negative for arthralgias, joint swelling and myalgias.   Skin:  Negative for pallor and rash.   Neurological:  Positive for weakness and light-headedness. Negative for dizziness and headaches.   Psychiatric/Behavioral:  Negative for agitation. The patient is not nervous/anxious.      Objective:     Vital Signs (Most Recent):  Temp: 98.2 °F (36.8 °C) (04/15/24 1649)  Pulse: 73 (04/15/24 1649)  Resp: 20 (04/15/24 1649)  BP: (!) 165/76 (04/15/24 1649)  SpO2: 97 % (04/15/24 1649) Vital Signs (24h Range):  Temp:  [97.9 °F (36.6 °C)-98.3 °F (36.8 °C)] 98.2 °F (36.8 °C)  Pulse:  [70-89] 73  Resp:  [13-20] 20  SpO2:  [97 %-100 %] 97 %  BP: (131-183)/(64-84) 165/76     Weight: 89.8 kg (198 lb)  Body mass index is 25.77 kg/m².     Physical Exam  Constitutional:       General: He is not in acute distress.  HENT:      Head: Normocephalic and atraumatic.   Eyes:      Extraocular Movements: Extraocular movements intact.      Pupils: Pupils are equal, round, and reactive to light.   Cardiovascular:      Rate and Rhythm: Normal rate and regular rhythm.      Heart sounds: No murmur heard.  Pulmonary:      Effort: Pulmonary effort is normal. No respiratory distress.      Breath sounds: No wheezing or rales.   Abdominal:      General: Abdomen is flat. Bowel sounds are normal. There is no distension.      Palpations: Abdomen is soft.      Tenderness: There is no abdominal tenderness.   Musculoskeletal:         General: No swelling  or tenderness. Normal range of motion.      Cervical back: Normal range of motion.      Right lower leg: No edema.      Left lower leg: No edema.   Lymphadenopathy:      Cervical: No cervical adenopathy.   Skin:     General: Skin is warm and dry.      Coloration: Skin is not jaundiced.      Findings: No bruising.      Comments: Right upper arm AV fistula   Neurological:      General: No focal deficit present.      Mental Status: He is alert and oriented to person, place, and time.              CRANIAL NERVES     CN III, IV, VI   Pupils are equal, round, and reactive to light.       Significant Labs: All pertinent labs within the past 24 hours have been reviewed.    Significant Imaging: I have reviewed all pertinent imaging results/findings within the past 24 hours.

## 2024-04-15 NOTE — ED NOTES
Assumed care of pt at this time. VSS, RR even and unlabored. Resting in bed comfortably. No voiced compaints of pain or discomfort at this time. Safety protocols remain intact.

## 2024-04-15 NOTE — ASSESSMENT & PLAN NOTE
"Patient's FSGs are controlled on current medication regimen.  Last A1c reviewed-   Lab Results   Component Value Date    HGBA1C 5.1 02/22/2023     Most recent fingerstick glucose reviewed- No results for input(s): "POCTGLUCOSE" in the last 24 hours.  Current correctional scale  Low  Decrease anti-hyperglycemic dose as follows-   Antihyperglycemics (From admission, onward)      None          Hold Oral hypoglycemics while patient is in the hospital.  Ordering Imipramine for peripheral neuropathy  "

## 2024-04-15 NOTE — PHARMACY MED REC
"Admission Medication History     The home medication history was taken by Jessica Wan.    You may go to "Admission" then "Reconcile Home Medications" tabs to review and/or act upon these items.     The home medication list has been updated by the Pharmacy department.   Please read ALL comments highlighted in yellow.   Please address this information as you see fit.    Feel free to contact us if you have any questions or require assistance.      The medications listed below were removed from the home medication list. Please reorder if appropriate:  Patient reports no longer taking the following medication(s):  METHOXY PEG-EPOETIN BETA (MIRCERA INJ)  ONDANSETRON (ZOFRAN) 4 MG TAB  POLYETHYLENE GLYCOL (GLYCOLAX) SUSP  SEVELAMER CARBONATE (RENVELA) 800 MG TAB    Medications listed below were obtained from: Patient/family  Current Outpatient Medications   Medication Sig    hydrALAZINE (APRESOLINE) 100 MG tablet Take 1 tablet (100 mg total) by mouth 3 (three) times daily.      NIFEdipine (PROCARDIA XL) 90 MG (OSM) 24 hr tablet Take 1 tablet (90 mg total) by mouth once daily.      imipramine (TOFRANIL) 25 MG tablet Take 1 tablet (25 mg total) by mouth every evening. Patient not taking.      oxyCODONE-acetaminophen (PERCOCET) 5-325 mg per tablet   Take 1 tablet by mouth every 8 (eight) hours as needed for Pain. Patient not taking.       Potential issues to be addressed PRIOR TO DISCHARGE  Patient reported not taking the following medications: (IMIPRAMINE 25 MG TAB and OXYCODONE-APAP (PERCOCET) 5-325 MG TAB). These medications remain on the home medication list. Please address accordingly.   Please discuss with the patient barriers to adherence with medication treatment plans    Jessica Wan  EXT [phone]                  .          "

## 2024-04-15 NOTE — TELEPHONE ENCOUNTER
[facility] Note    I spoke with the patient.  He is having hematochezia with a new anemia following his colonoscopy last week with polypectomy.  I have recommended that he present to the ED now for further evaluation.    Roopa Pérez MD

## 2024-04-15 NOTE — ED TRIAGE NOTES
Catalino Mcfadden, a 59 y.o. male presents to the ED w/ complaint of blood in stool x1 week. Patient stated he went to get blood work today and was told to come to the ER due to blood count being low.     Triage note:  Chief Complaint   Patient presents with    Rectal Bleeding     Pt reports rectal bleeding x1 week + had colonoscopy on 4/8/24. Endorsing dizziness. Told to come to ED for further evaluation. Abnormal H+H levels.     Review of patient's allergies indicates:  No Known Allergies  Past Medical History:   Diagnosis Date    Cancer     Diabetes mellitus, type 2     Diabetic foot ulcer associated with diabetes mellitus due to underlying condition 07/16/2020    ESRD on hemodialysis     Hyperlipidemia     Hypertension     Obese

## 2024-04-15 NOTE — ASSESSMENT & PLAN NOTE
Patient has hypokalemia which is Acute and currently uncontrolled. Most recent potassium levels reviewed-   Lab Results   Component Value Date    K 3.0 (L) 04/15/2024   . Will continue potassium replacement per protocol and recheck repeat levels after replacement completed.

## 2024-04-15 NOTE — TREATMENT PLAN
Please make sure patient starts Golytely prep at 6PM. Prep should be completed within 2 hours for best results, but must be completed within 4 hours of starting the prep.    Kely Joshua, DO  Gastroenterology PGY-4

## 2024-04-15 NOTE — FIRST PROVIDER EVALUATION
Medical screening examination initiated.  I have conducted a focused provider triage encounter, findings are as follows:    Brief history of present illness:  had colonoscopy last week, still having GI bleeding, ESRD on HD, Hg 7.9 today    Vitals:    04/15/24 1245   BP: 131/68   Patient Position: Sitting   Pulse: 84   Resp: 18   Temp: 98.3 °F (36.8 °C)   TempSrc: Oral   SpO2: 100%   Weight: 89.8 kg (198 lb)       Pertinent physical exam:  No acute distress or altered mental status.     Brief workup plan:  repeat CBC, type and screen    Preliminary workup initiated; this workup will be continued and followed by the physician or advanced practice provider that is assigned to the patient when roomed.

## 2024-04-15 NOTE — ASSESSMENT & PLAN NOTE
Patient's anemia is currently uncontrolled. Has received 1 units of PRBCs on 04/15/24 . Etiology likely d/t acute blood loss which was from GI bleed  Current CBC reviewed-   Lab Results   Component Value Date    HGB 7.3 (L) 04/15/2024    HCT 22.4 (L) 04/15/2024     Monitor serial CBC and transfuse if patient becomes hemodynamically unstable, symptomatic or H/H drops below 7/21.  Please see GI bleed

## 2024-04-15 NOTE — Clinical Note
Tripp Hobbs, My name is Brianna Mendoza, one of the med oncs. Mr. Mcfadden has ESRD. He had imaging in Jan 2024 w/ diffuse uptake, bone lesion and bronch/ebus w/ no current evidence of cancer. I'd like to repeat his imaging since his last was in January, wanted to make sure its ok to use contrast and if you recommend I time it up with dialysis.   Thanks! oral

## 2024-04-15 NOTE — TELEPHONE ENCOUNTER
Called pt letting him know Dr Mendoza and Dr. Pérez has requested pt to go the ER due to bleeding post colonoscopy and worsened anemia. Pt stated he's waiting in the ER and verbalized understanding.

## 2024-04-16 ENCOUNTER — ANESTHESIA (OUTPATIENT)
Dept: ENDOSCOPY | Facility: HOSPITAL | Age: 59
DRG: 919 | End: 2024-04-16
Payer: COMMERCIAL

## 2024-04-16 ENCOUNTER — PATIENT MESSAGE (OUTPATIENT)
Dept: HEMATOLOGY/ONCOLOGY | Facility: CLINIC | Age: 59
End: 2024-04-16
Payer: COMMERCIAL

## 2024-04-16 ENCOUNTER — ANESTHESIA EVENT (OUTPATIENT)
Dept: ENDOSCOPY | Facility: HOSPITAL | Age: 59
DRG: 919 | End: 2024-04-16
Payer: COMMERCIAL

## 2024-04-16 PROBLEM — I16.0 HYPERTENSIVE URGENCY: Status: ACTIVE | Noted: 2024-04-16

## 2024-04-16 LAB
ALBUMIN SERPL BCP-MCNC: 2.6 G/DL (ref 3.5–5.2)
ALP SERPL-CCNC: 474 U/L (ref 55–135)
ALT SERPL W/O P-5'-P-CCNC: 28 U/L (ref 10–44)
ANION GAP SERPL CALC-SCNC: 10 MMOL/L (ref 8–16)
AST SERPL-CCNC: 44 U/L (ref 10–40)
BASOPHILS # BLD AUTO: 0.02 K/UL (ref 0–0.2)
BASOPHILS # BLD AUTO: 0.03 K/UL (ref 0–0.2)
BASOPHILS # BLD AUTO: 0.05 K/UL (ref 0–0.2)
BASOPHILS NFR BLD: 0.6 % (ref 0–1.9)
BASOPHILS NFR BLD: 0.6 % (ref 0–1.9)
BASOPHILS NFR BLD: 0.9 % (ref 0–1.9)
BILIRUB SERPL-MCNC: 1.2 MG/DL (ref 0.1–1)
BLD PROD TYP BPU: NORMAL
BLOOD UNIT EXPIRATION DATE: NORMAL
BLOOD UNIT TYPE CODE: 5100
BLOOD UNIT TYPE: NORMAL
BUN SERPL-MCNC: 34 MG/DL (ref 6–20)
CALCIUM SERPL-MCNC: 9 MG/DL (ref 8.7–10.5)
CHLORIDE SERPL-SCNC: 95 MMOL/L (ref 95–110)
CO2 SERPL-SCNC: 22 MMOL/L (ref 23–29)
CODING SYSTEM: NORMAL
CREAT SERPL-MCNC: 5.7 MG/DL (ref 0.5–1.4)
CROSSMATCH INTERPRETATION: NORMAL
DIFFERENTIAL METHOD BLD: ABNORMAL
DISPENSE STATUS: NORMAL
EOSINOPHIL # BLD AUTO: 0.1 K/UL (ref 0–0.5)
EOSINOPHIL # BLD AUTO: 0.1 K/UL (ref 0–0.5)
EOSINOPHIL # BLD AUTO: 0.2 K/UL (ref 0–0.5)
EOSINOPHIL NFR BLD: 2.2 % (ref 0–8)
EOSINOPHIL NFR BLD: 2.4 % (ref 0–8)
EOSINOPHIL NFR BLD: 3.2 % (ref 0–8)
ERYTHROCYTE [DISTWIDTH] IN BLOOD BY AUTOMATED COUNT: 15 % (ref 11.5–14.5)
ERYTHROCYTE [DISTWIDTH] IN BLOOD BY AUTOMATED COUNT: 15 % (ref 11.5–14.5)
ERYTHROCYTE [DISTWIDTH] IN BLOOD BY AUTOMATED COUNT: 15.4 % (ref 11.5–14.5)
ERYTHROCYTE [DISTWIDTH] IN BLOOD BY AUTOMATED COUNT: 15.4 % (ref 11.5–14.5)
EST. GFR  (NO RACE VARIABLE): 10.7 ML/MIN/1.73 M^2
FINAL PATHOLOGIC DIAGNOSIS: NORMAL
GLUCOSE SERPL-MCNC: 90 MG/DL (ref 70–110)
GROSS: NORMAL
HCT VFR BLD AUTO: 21.2 % (ref 40–54)
HCT VFR BLD AUTO: 22.7 % (ref 40–54)
HCT VFR BLD AUTO: 24.3 % (ref 40–54)
HCT VFR BLD AUTO: 24.9 % (ref 40–54)
HGB BLD-MCNC: 7.1 G/DL (ref 14–18)
HGB BLD-MCNC: 7.6 G/DL (ref 14–18)
HGB BLD-MCNC: 8.1 G/DL (ref 14–18)
HGB BLD-MCNC: 8.3 G/DL (ref 14–18)
IMM GRANULOCYTES # BLD AUTO: 0.01 K/UL (ref 0–0.04)
IMM GRANULOCYTES # BLD AUTO: 0.01 K/UL (ref 0–0.04)
IMM GRANULOCYTES # BLD AUTO: 0.02 K/UL (ref 0–0.04)
IMM GRANULOCYTES NFR BLD AUTO: 0.2 % (ref 0–0.5)
IMM GRANULOCYTES NFR BLD AUTO: 0.2 % (ref 0–0.5)
IMM GRANULOCYTES NFR BLD AUTO: 0.6 % (ref 0–0.5)
LYMPHOCYTES # BLD AUTO: 0.4 K/UL (ref 1–4.8)
LYMPHOCYTES # BLD AUTO: 0.5 K/UL (ref 1–4.8)
LYMPHOCYTES # BLD AUTO: 0.6 K/UL (ref 1–4.8)
LYMPHOCYTES NFR BLD: 11.2 % (ref 18–48)
LYMPHOCYTES NFR BLD: 13.9 % (ref 18–48)
LYMPHOCYTES NFR BLD: 8.7 % (ref 18–48)
Lab: NORMAL
MAGNESIUM SERPL-MCNC: 1.9 MG/DL (ref 1.6–2.6)
MCH RBC QN AUTO: 29.3 PG (ref 27–31)
MCH RBC QN AUTO: 29.8 PG (ref 27–31)
MCH RBC QN AUTO: 29.9 PG (ref 27–31)
MCH RBC QN AUTO: 30.6 PG (ref 27–31)
MCHC RBC AUTO-ENTMCNC: 33.3 G/DL (ref 32–36)
MCHC RBC AUTO-ENTMCNC: 33.3 G/DL (ref 32–36)
MCHC RBC AUTO-ENTMCNC: 33.5 G/DL (ref 32–36)
MCHC RBC AUTO-ENTMCNC: 33.5 G/DL (ref 32–36)
MCV RBC AUTO: 88 FL (ref 82–98)
MCV RBC AUTO: 89 FL (ref 82–98)
MCV RBC AUTO: 90 FL (ref 82–98)
MCV RBC AUTO: 92 FL (ref 82–98)
MONOCYTES # BLD AUTO: 0.3 K/UL (ref 0.3–1)
MONOCYTES # BLD AUTO: 0.5 K/UL (ref 0.3–1)
MONOCYTES # BLD AUTO: 0.5 K/UL (ref 0.3–1)
MONOCYTES NFR BLD: 10.3 % (ref 4–15)
MONOCYTES NFR BLD: 9.3 % (ref 4–15)
MONOCYTES NFR BLD: 9.7 % (ref 4–15)
NEUTROPHILS # BLD AUTO: 2.4 K/UL (ref 1.8–7.7)
NEUTROPHILS # BLD AUTO: 3.9 K/UL (ref 1.8–7.7)
NEUTROPHILS # BLD AUTO: 4 K/UL (ref 1.8–7.7)
NEUTROPHILS NFR BLD: 72.8 % (ref 38–73)
NEUTROPHILS NFR BLD: 75.2 % (ref 38–73)
NEUTROPHILS NFR BLD: 78 % (ref 38–73)
NRBC BLD-RTO: 0 /100 WBC
PHOSPHATE SERPL-MCNC: 3.3 MG/DL (ref 2.7–4.5)
PLATELET # BLD AUTO: 131 K/UL (ref 150–450)
PLATELET # BLD AUTO: 144 K/UL (ref 150–450)
PLATELET # BLD AUTO: 168 K/UL (ref 150–450)
PLATELET # BLD AUTO: 174 K/UL (ref 150–450)
PMV BLD AUTO: 10.1 FL (ref 9.2–12.9)
PMV BLD AUTO: 10.2 FL (ref 9.2–12.9)
PMV BLD AUTO: 10.3 FL (ref 9.2–12.9)
PMV BLD AUTO: 10.3 FL (ref 9.2–12.9)
POCT GLUCOSE: 74 MG/DL (ref 70–110)
POTASSIUM SERPL-SCNC: 3.5 MMOL/L (ref 3.5–5.1)
PROT SERPL-MCNC: 6.2 G/DL (ref 6–8.4)
RBC # BLD AUTO: 2.38 M/UL (ref 4.6–6.2)
RBC # BLD AUTO: 2.59 M/UL (ref 4.6–6.2)
RBC # BLD AUTO: 2.71 M/UL (ref 4.6–6.2)
RBC # BLD AUTO: 2.71 M/UL (ref 4.6–6.2)
SODIUM SERPL-SCNC: 127 MMOL/L (ref 136–145)
TRANS ERYTHROCYTES VOL PATIENT: NORMAL ML
WBC # BLD AUTO: 3.31 K/UL (ref 3.9–12.7)
WBC # BLD AUTO: 4.95 K/UL (ref 3.9–12.7)
WBC # BLD AUTO: 5.28 K/UL (ref 3.9–12.7)
WBC # BLD AUTO: 5.72 K/UL (ref 3.9–12.7)

## 2024-04-16 PROCEDURE — 21400001 HC TELEMETRY ROOM

## 2024-04-16 PROCEDURE — 37000009 HC ANESTHESIA EA ADD 15 MINS: Performed by: INTERNAL MEDICINE

## 2024-04-16 PROCEDURE — 37000008 HC ANESTHESIA 1ST 15 MINUTES: Performed by: INTERNAL MEDICINE

## 2024-04-16 PROCEDURE — 45378 DIAGNOSTIC COLONOSCOPY: CPT | Mod: ,,, | Performed by: INTERNAL MEDICINE

## 2024-04-16 PROCEDURE — 45378 DIAGNOSTIC COLONOSCOPY: CPT | Performed by: INTERNAL MEDICINE

## 2024-04-16 PROCEDURE — 99222 1ST HOSP IP/OBS MODERATE 55: CPT | Mod: ,,, | Performed by: NURSE PRACTITIONER

## 2024-04-16 PROCEDURE — 85025 COMPLETE CBC W/AUTO DIFF WBC: CPT

## 2024-04-16 PROCEDURE — P9021 RED BLOOD CELLS UNIT: HCPCS

## 2024-04-16 PROCEDURE — 84100 ASSAY OF PHOSPHORUS: CPT

## 2024-04-16 PROCEDURE — 99223 1ST HOSP IP/OBS HIGH 75: CPT | Mod: 25,,, | Performed by: INTERNAL MEDICINE

## 2024-04-16 PROCEDURE — 63600175 PHARM REV CODE 636 W HCPCS: Performed by: NURSE PRACTITIONER

## 2024-04-16 PROCEDURE — 5A1D70Z PERFORMANCE OF URINARY FILTRATION, INTERMITTENT, LESS THAN 6 HOURS PER DAY: ICD-10-PCS | Performed by: NURSE PRACTITIONER

## 2024-04-16 PROCEDURE — 36415 COLL VENOUS BLD VENIPUNCTURE: CPT

## 2024-04-16 PROCEDURE — 86920 COMPATIBILITY TEST SPIN: CPT

## 2024-04-16 PROCEDURE — 80053 COMPREHEN METABOLIC PANEL: CPT

## 2024-04-16 PROCEDURE — D9220A PRA ANESTHESIA: Mod: ANES,,, | Performed by: ANESTHESIOLOGY

## 2024-04-16 PROCEDURE — 94761 N-INVAS EAR/PLS OXIMETRY MLT: CPT

## 2024-04-16 PROCEDURE — 25000003 PHARM REV CODE 250: Performed by: REGISTERED NURSE

## 2024-04-16 PROCEDURE — 27202298: Performed by: INTERNAL MEDICINE

## 2024-04-16 PROCEDURE — 25000003 PHARM REV CODE 250: Performed by: NURSE PRACTITIONER

## 2024-04-16 PROCEDURE — D9220A PRA ANESTHESIA: Mod: CRNA,,, | Performed by: REGISTERED NURSE

## 2024-04-16 PROCEDURE — 63600175 PHARM REV CODE 636 W HCPCS

## 2024-04-16 PROCEDURE — 0W3P8ZZ CONTROL BLEEDING IN GASTROINTESTINAL TRACT, VIA NATURAL OR ARTIFICIAL OPENING ENDOSCOPIC: ICD-10-PCS | Performed by: INTERNAL MEDICINE

## 2024-04-16 PROCEDURE — 63600175 PHARM REV CODE 636 W HCPCS: Performed by: REGISTERED NURSE

## 2024-04-16 PROCEDURE — 80100016 HC MAINTENANCE HEMODIALYSIS

## 2024-04-16 PROCEDURE — 85025 COMPLETE CBC W/AUTO DIFF WBC: CPT | Mod: 91

## 2024-04-16 PROCEDURE — 85027 COMPLETE CBC AUTOMATED: CPT

## 2024-04-16 PROCEDURE — 83735 ASSAY OF MAGNESIUM: CPT

## 2024-04-16 PROCEDURE — 25000003 PHARM REV CODE 250

## 2024-04-16 RX ORDER — SODIUM CHLORIDE 0.9 % (FLUSH) 0.9 %
10 SYRINGE (ML) INJECTION
Status: DISCONTINUED | OUTPATIENT
Start: 2024-04-16 | End: 2024-04-16 | Stop reason: HOSPADM

## 2024-04-16 RX ORDER — HEPARIN SODIUM 1000 [USP'U]/ML
1000 INJECTION, SOLUTION INTRAVENOUS; SUBCUTANEOUS
Status: DISCONTINUED | OUTPATIENT
Start: 2024-04-16 | End: 2024-04-18 | Stop reason: HOSPADM

## 2024-04-16 RX ORDER — PHENYLEPHRINE HYDROCHLORIDE 10 MG/ML
INJECTION INTRAVENOUS
Status: DISCONTINUED | OUTPATIENT
Start: 2024-04-16 | End: 2024-04-16

## 2024-04-16 RX ORDER — HYDROCODONE BITARTRATE AND ACETAMINOPHEN 500; 5 MG/1; MG/1
TABLET ORAL
Status: DISCONTINUED | OUTPATIENT
Start: 2024-04-16 | End: 2024-04-18 | Stop reason: HOSPADM

## 2024-04-16 RX ORDER — LIDOCAINE HYDROCHLORIDE 20 MG/ML
INJECTION INTRAVENOUS
Status: DISCONTINUED | OUTPATIENT
Start: 2024-04-16 | End: 2024-04-16

## 2024-04-16 RX ORDER — HYDRALAZINE HYDROCHLORIDE 20 MG/ML
10 INJECTION INTRAMUSCULAR; INTRAVENOUS EVERY 6 HOURS PRN
Status: DISCONTINUED | OUTPATIENT
Start: 2024-04-16 | End: 2024-04-18 | Stop reason: HOSPADM

## 2024-04-16 RX ORDER — SODIUM CHLORIDE 9 MG/ML
INJECTION, SOLUTION INTRAVENOUS ONCE
Status: COMPLETED | OUTPATIENT
Start: 2024-04-16 | End: 2024-04-16

## 2024-04-16 RX ORDER — HYDRALAZINE HYDROCHLORIDE 20 MG/ML
10 INJECTION INTRAMUSCULAR; INTRAVENOUS EVERY 6 HOURS PRN
Status: DISCONTINUED | OUTPATIENT
Start: 2024-04-16 | End: 2024-04-16

## 2024-04-16 RX ORDER — PROPOFOL 10 MG/ML
VIAL (ML) INTRAVENOUS CONTINUOUS PRN
Status: DISCONTINUED | OUTPATIENT
Start: 2024-04-16 | End: 2024-04-16

## 2024-04-16 RX ADMIN — SODIUM CHLORIDE: 0.9 INJECTION, SOLUTION INTRAVENOUS at 09:04

## 2024-04-16 RX ADMIN — LIDOCAINE HYDROCHLORIDE 50 MG: 20 INJECTION INTRAVENOUS at 09:04

## 2024-04-16 RX ADMIN — PROPOFOL 50 MG: 10 INJECTION, EMULSION INTRAVENOUS at 09:04

## 2024-04-16 RX ADMIN — HEPARIN SODIUM 1000 UNITS: 1000 INJECTION, SOLUTION INTRAVENOUS; SUBCUTANEOUS at 06:04

## 2024-04-16 RX ADMIN — NIFEDIPINE 90 MG: 60 TABLET, FILM COATED, EXTENDED RELEASE ORAL at 11:04

## 2024-04-16 RX ADMIN — HYDRALAZINE HYDROCHLORIDE 10 MG: 20 INJECTION, SOLUTION INTRAMUSCULAR; INTRAVENOUS at 05:04

## 2024-04-16 RX ADMIN — PHENYLEPHRINE HYDROCHLORIDE 25 MCG: 10 INJECTION INTRAVENOUS at 09:04

## 2024-04-16 RX ADMIN — PROPOFOL 125 MCG/KG/MIN: 10 INJECTION, EMULSION INTRAVENOUS at 09:04

## 2024-04-16 RX ADMIN — PROCHLORPERAZINE MALEATE 5 MG: 5 TABLET ORAL at 03:04

## 2024-04-16 NOTE — CARE UPDATE
"RAPID RESPONSE NURSE CHART REVIEW        Chart Reviewed: 04/16/2024, 5:06 AM    MRN: [id number]  Bed: 606/606 A    Dx: GI bleed    Catalino Mcfadden has a past medical history of Cancer, Diabetes mellitus, type 2, Diabetic foot ulcer associated with diabetes mellitus due to underlying condition, ESRD on hemodialysis, Hyperlipidemia, Hypertension, and Obese.    Last VS: BP (!) 206/79   Pulse 71   Temp 97.6 °F (36.4 °C) (Oral)   Resp 19   Ht 6' 1.5" (1.867 m)   Wt 93.5 kg (206 lb 2.1 oz)   SpO2 100%   BMI 26.83 kg/m²     24H Vital Sign Range:  Temp:  [97.5 °F (36.4 °C)-98.7 °F (37.1 °C)]   Pulse:  [67-94]   Resp:  [13-20]   BP: (131-215)/()   SpO2:  [97 %-100 %]     Level of Consciousness (AVPU): alert    Recent Labs     04/15/24  2119 04/16/24  0004 04/16/24  0233   WBC 3.99 5.28 5.72   HGB 7.5* 8.1* 8.3*   HCT 23.2* 24.3* 24.9*   * 168 174       Recent Labs     04/15/24  1029 04/15/24  1613 04/16/24  0233   * 128* 127*   K 3.0* 3.3* 3.5   CL 94* 96 95   CO2 27 24 22*   BUN 32* 32* 34*   CREATININE 4.9* 5.1* 5.7*   GLU 87 81 90   PHOS  --   --  3.3   MG  --  1.8 1.9        No results for input(s): "PH", "PCO2", "PO2", "HCO3", "POCSATURATED", "BE" in the last 72 hours.     OXYGEN:             MEWS score: 1    Bedside RNDylan contacted s/t persistent hypertensive episodes noted via flowsheet data.  5 MD contacted and updated on patient assessment via secure chat. Planning to administer IVP medications per MAR orders.  No additional concerns verbalized at this time. Please call [phone] for further concerns or assistance.    Sherry Baez RN        "

## 2024-04-16 NOTE — HPI
Catalino Mcfadden is a 59 year old man h history of T2DM, ESRD on HD, HTN, and diffuse hypermetabolic adenopathy, who presents with hematochezia. Patient reports that on Friday 4/12, he had multiple episodes of loose, bloody bowel movements that were bright red in color. He denies having had any abdominal pain with or prior to the bowel movements. He has been having 3-4 maroon colored bowel movements every day since 4/12. He recently underwent colonoscopy on 4/8 which identified 5 polyps, with 4 of them removed. On presentation, Hgb 7.5 down from 11.5. Otherwise on admission, pt's vital signs stable. He denies any chest pain, SOB, abdominal pain, nausea or vomiting.    Prior endoscopies:  4/8/24 Colonoscopy: One 30 mm polyp in the cecum, removed piecemeal using a hot snare. Resected and retrieved. Clips were placed. Injected. One 15 mm polyp in the cecum. Resection not attempted due to location near the ileocecal valve. One 3 mm polyp in the ascending colon, removed with a jumbo cold forceps. Resected and retrieved. One 30 mm polyp in the descending colon, removed with a hot snare. Resected and retrieved. Injected. Clips were placed. Clip : [place]. One 3 mm polyp in the descending colon, removed with a jumbo cold forceps. Resected and retrieved. The examination was otherwise normal.

## 2024-04-16 NOTE — CONSULTS
[facility] S[facility]  Nephrology  Consult Note    Patient Name: Catalino Mcfadden  MRN: [id number]  Admission Date: 4/15/2024  Hospital Length of Stay: 1 days  Attending Provider: Tonie Lorenz MD   Primary Care Physician: Lizbeth, Primary Doctor  Principal Problem:GI bleed    Inpatient consult to Nephrology  Consult performed by: Heather Young, DNP, FNP-C  Consult ordered by: Camron Gomez MD  Reason for consult: ESRD        Subjective:     HPI: The patient is a 59 y.o. Black or  Male with multiple co morbidities including ESRD on HD TThS, HTN, T2DM with neuropathy and gastroparesis who presents to ED on 4/15/2024 with GIB. He endorses bloody, diarrhea and lightheadedness since Friday, 4/12. Denies headaches, visual changes, SOB, cough, chest pain, palpitation, nausea, vomiting, abdominal pain, constipation, urinary frequency or any weakness. In the ED, the pt was afebrile and hypertensive with BP of 183/84 and HR 74. Labwork remarkable for Hgb dropping from 11.5 to 7.5, WBC 3.62. CMP remarkable for hypokalemia of 3.0, Creatinine 4.9. ALP elevated at 439. One unit of PRBC ordered GI services consulted and pt was admitted to hospital medicine for further management of GI bleed. Nephrology consulted for management of ESRD and HD treatment.    Past Medical History:   Diagnosis Date    Cancer     Diabetes mellitus, type 2     Diabetic foot ulcer associated with diabetes mellitus due to underlying condition 07/16/2020    ESRD on hemodialysis     Hyperlipidemia     Hypertension     Obese        Past Surgical History:   Procedure Laterality Date    AV FISTULA PLACEMENT Left 07/06/2023    Procedure: CREATION, AV FISTULA;  Surgeon: Daniel Poon MD;  Location: [facility] OR;  Service: Vascular;  Laterality: Left;  RN PREOP 6/28/2023  LABS DAY OF SURGERY    BONE BIOPSY Left 07/17/2020    Procedure: BIOPSY, BONE;  Surgeon: Verona Whitmore DPM;  Location: [facility] OR;  Service: Podiatry;  Laterality:  Left;    CERVICAL DISC SURGERY  07/11/2016    COLONOSCOPY N/A 3/25/2024    Procedure: COLONOSCOPY;  Surgeon: Roopa Pérez MD;  Location: [facility] (4TH FLR);  Service: Endoscopy;  Laterality: N/A;  Ref By: Dr. Pérez   constipation prep  Diaylsis Patient Lab has been ordered  3/20-precall complete-MS    COLONOSCOPY N/A 4/8/2024    Procedure: COLONOSCOPY;  Surgeon: Roopa Pérez MD;  Location: [facility] (2ND FLR);  Service: Endoscopy;  Laterality: N/A;  Prep instructions sent via portal/given to pt in clinic  pt had to be r/s at  request  Dialysis Tues,Thurs,Sat-dw  Peg Prep-dw  4/2/24- Labs - dialysis /poor prep on 3/26 - PEG x 2 - extended prep /LVM for precall - ERW  4/5-LVM for precall-KPvt    DEBRIDEMENT Left 07/17/2020    Procedure: DEBRIDEMENT;  Surgeon: Verona Whitmore DPM;  Location: [facility];  Service: Podiatry;  Laterality: Left;    DEBRIDEMENT OF FOOT Right     toes & plantar surface    ENDOBRONCHIAL ULTRASOUND N/A 3/1/2024    Procedure: ENDOBRONCHIAL ULTRASOUND (EBUS);  Surgeon: Francisco Fleming MD;  Location: [facility] 2ND FLR;  Service: Pulmonary;  Laterality: N/A;    ESOPHAGEAL MANOMETRY WITH MEASUREMENT OF IMPEDANCE N/A 03/27/2023    Procedure: MANOMETRY, ESOPHAGUS, WITH IMPEDANCE MEASUREMENT;  Surgeon: Roopa Pérez MD;  Location: [facility] (4TH FLR);  Service: Endoscopy;  Laterality: N/A;  Belching and rumination protocol please  instructions via portal - sm    ESOPHAGOGASTRODUODENOSCOPY N/A 12/16/2022    Procedure: EGD (ESOPHAGOGASTRODUODENOSCOPY);  Surgeon: Eren Francois MD;  Location: [facility];  Service: Endoscopy;  Laterality: N/A;  Pt. on Dialysis. K+ ordered prior to procedure.EC    ESOPHAGOGASTRODUODENOSCOPY N/A 12/5/2023    Procedure: EGD (ESOPHAGOGASTRODUODENOSCOPY);  Surgeon: Kash Johnston MD;  Location: [facility];  Service: Endoscopy;  Laterality: N/A;    FRACTURE SURGERY Right     medial ankle, metal plate present    INJECTION OF ANESTHETIC AGENT  AROUND NERVE Right 2/2/2024    Procedure: BLOCK, NERVE STELLATE GANGLION *HOLD ASPIRIN 5 DAYS PRIOR*;  Surgeon: Gokul Gonzalez MD;  Location: [facility] MGT;  Service: Pain Management;  Laterality: Right;  [phone]    INSERTION OF TUNNELED CENTRAL VENOUS CATHETER (CVC) WITH SUBCUTANEOUS PORT N/A 06/11/2021    Procedure: TUNNEL CATH INSERTION WITHOUT PORT;  Surgeon: Dosc Diagnostic Provider;  Location: [facility] OR;  Service: Radiology;  Laterality: N/A;  11AM START---PHONE PREOP 6/10/21---COVID POSTIVE ON 7/2020---NO S/S    left leg orthopedic surgery Left     REVISION OF ARTERIOVENOUS FISTULA Left 3/14/2024    Procedure: REVISION, AV FISTULA;  Surgeon: Daniel Poon MD;  Location: [facility] OR;  Service: Vascular;  Laterality: Left;  RN preop 3/6/2024----NEED ORDERS    UPPER GASTROINTESTINAL ENDOSCOPY         Review of patient's allergies indicates:  No Known Allergies  Current Facility-Administered Medications   Medication Dose Route Frequency Provider Last Rate Last Admin    0.9%  NaCl infusion (for blood administration)   Intravenous Q24H PRN Dirk Reed MD        dextrose 10% bolus 125 mL 125 mL  12.5 g Intravenous PRN Camron Gomez MD        dextrose 10% bolus 250 mL 250 mL  25 g Intravenous PRN Camron Gomez MD        glucagon (human recombinant) injection 1 mg  1 mg Intramuscular PRN Camron Gomez MD        glucose chewable tablet 16 g  16 g Oral PRN Camron Gomez MD        glucose chewable tablet 24 g  24 g Oral PRN Camron Gomez MD        hydrALAZINE injection 10 mg  10 mg Intravenous Q6H PRN Dirk Reed MD   10 mg at 04/16/24 0512    imipramine tablet 25 mg  25 mg Oral QHS Camron Gomez MD        naloxone 0.4 mg/mL injection 0.02 mg  0.02 mg Intravenous PRN Camron Gomez MD        NIFEdipine 24 hr tablet 90 mg  90 mg Oral Daily Camron Gomez MD   90 mg at 04/16/24  1125    prochlorperazine tablet 5 mg  5 mg Oral TID PRN Dirk Reed MD   5 mg at 04/16/24 0322    sodium chloride 0.9% flush 10 mL  10 mL Intravenous Q12H PRN Camron Gomez MD         Family History       Problem Relation (Age of Onset)    Heart disease Father          Tobacco Use    Smoking status: Never    Smokeless tobacco: Never   Substance and Sexual Activity    Alcohol use: Not Currently     Comment: occ rare beer    Drug use: No    Sexual activity: Not Currently     Review of Systems   Constitutional:  Negative for chills and fever.   HENT:  Negative for congestion and sore throat.    Eyes:  Negative for visual disturbance.   Respiratory:  Negative for cough and shortness of breath.    Cardiovascular:  Negative for chest pain and palpitations.   Gastrointestinal:  Positive for blood in stool. Negative for abdominal pain.   Genitourinary:  Negative for dysuria and hematuria.   Musculoskeletal:  Negative for arthralgias and back pain.   Skin:  Negative for rash and wound.   Neurological:  Negative for dizziness and syncope.   Psychiatric/Behavioral:  Negative for agitation and behavioral problems.      Objective:     Vital Signs (Most Recent):  Temp: 98.9 °F (37.2 °C) (04/16/24 1115)  Pulse: 70 (04/16/24 1115)  Resp: 18 (04/16/24 1115)  BP: (!) 227/99 (04/16/24 1115)  SpO2: 99 % (04/16/24 1115) Vital Signs (24h Range):  Temp:  [97.5 °F (36.4 °C)-98.9 °F (37.2 °C)] 98.9 °F (37.2 °C)  Pulse:  [60-94] 70  Resp:  [12-20] 18  SpO2:  [97 %-100 %] 99 %  BP: (150-227)/() 227/99     Weight: 93.5 kg (206 lb 2.1 oz) (04/15/24 2136)  Body mass index is 26.83 kg/m².  Body surface area is 2.2 meters squared.    I/O last 3 completed shifts:  In: 500 [Blood:500]  Out: -      Physical Exam  Vitals reviewed.   Constitutional:       General: He is not in acute distress.     Appearance: Normal appearance.   HENT:      Head: Normocephalic and atraumatic.   Cardiovascular:      Rate and Rhythm: Normal  rate and regular rhythm.   Pulmonary:      Effort: Pulmonary effort is normal. No respiratory distress.   Abdominal:      Palpations: Abdomen is soft.      Tenderness: There is no abdominal tenderness.   Musculoskeletal:         General: No swelling or tenderness.      Cervical back: Full passive range of motion without pain and normal range of motion.   Skin:     General: Skin is warm and dry.   Neurological:      General: No focal deficit present.      Mental Status: He is alert and oriented to person, place, and time.   Psychiatric:         Mood and Affect: Mood normal.         Behavior: Behavior normal.          Significant Labs:  CBC:   Recent Labs   Lab 04/16/24  0233   WBC 5.72   RBC 2.71*   HGB 8.3*   HCT 24.9*      MCV 92   MCH 30.6   MCHC 33.3     CMP:   Recent Labs   Lab 04/16/24 0233   GLU 90   CALCIUM 9.0   ALBUMIN 2.6*   PROT 6.2   *   K 3.5   CO2 22*   CL 95   BUN 34*   CREATININE 5.7*   ALKPHOS 474*   ALT 28   AST 44*   BILITOT 1.2*     All labs within the past 24 hours have been reviewed.    Assessment/Plan:     Renal/  ESRD on hemodialysis  59 y.o. Black or  Male ESRD-HD M-W-F presents to ED on 4/15/2024 with diagnosis of: Hematochezia [K92.1];Dizziness [R42];Chest pain [R07.9]   Nephrology consulted for inpatient ESRD-HD management    Outpatient HD Information:  -Dialysis modality: Hemodialysis  -Outpatient HD unit: [facility]   -Nephrologist: Dr. Hobbs   -HD TX days: Tuesday/Thursday/Saturday, duration of treatment: 4 hr  -Dialysis access: dialysis catheter   -Residual urine: Anuric   -EDW: 109 kg    Assessment:   - Will provide dialysis today (04/16/2024) with UF - 1L as BP tolerates for metabolic clearance and volume management   - Labs reviewed and dialysate to be adjusted to current labs.   - Continue to monitor intake and output  - Please avoid gadolinium, fleets, phos-based laxatives, NSAIDs  - Dialysis thrice weekly unless more urgent indications arise.  Will evaluate RRT requirements Daily.    Anemia of ESRD   Recent Labs   Lab 04/16/24  0004 04/16/24  0233 04/16/24  1202   WBC 5.28 5.72 3.31*   HGB 8.1* 8.3* 7.1*   HCT 24.3* 24.9* 21.2*    174 131*     Lab Results   Component Value Date    FESATURATED 49 04/15/2024    FERRITIN 1,143 (H) 04/15/2024       - Goal in ESRD is Hgb of 10-11. Hgb 7.1.  - EPO with HD    Mineral Bone Disease in ESRD   Lab Results   Component Value Date    .0 (H) 02/23/2022    CALCIUM 9.0 04/16/2024    ALBUMIN 2.6 (L) 04/16/2024    PHOS 3.3 04/16/2024       - F/U PO4, Mg, Calcium. And albumin levels daily.   - Renal diet with protein intake goal 1.5 g/kg/d with 1 L fluid restriction   - Restart home phos binder, phos 3.3    GI  * GI bleed  - defer to primary team         Thank you for your consult. I will follow-up with patient. Please contact us if you have any additional questions.    Heather Young DNP, FNP-C  Nephrology  [facility] - Med Surg

## 2024-04-16 NOTE — HOSPITAL COURSE
Pt admitted to hospital medicine for lower GI bleed. At the ED Hgb was 7.5 from 11.5, one unit of PRBC was given. Subsequent Hgb was stable at 8.3. GI consulted and colonoscopy was performed which showed no evidence of active bleeding, brown stool in the colon. Ulcer with flat pigmented spot in the cecum with prior clips, another placed and another ulcer in the descending with prior clips, two placed. Clips were placed given stigmata of bleeding but nothing active. Pt received hemodialysis. Hgb 7.0 from 8.3, receiving one unit of blood. To follow up outpatient with ID. D/C after dialysis session.

## 2024-04-16 NOTE — NURSING
Primary Team notified of patient's high BP readings . Awaiting orders for BP management interventions .  Patient informed regarding situation . No symptoms of acute distress noted at this time . MD Cavazos reassured that he will look into patient's chart and coordinate . Monitoring .

## 2024-04-16 NOTE — [employer]ATION PATIENT INSTRUCTIONS
Discharge Summary/Instructions after an Endoscopic Procedure  Patient Name: Catalino Mcfadden  Patient MRN: [id number]  Patient YOB: 1965 Tuesday, April 16, 2024  Jarett Hill MD  Dear patient,  As a result of recent federal legislation (The Federal Cures Act), you may   receive lab or pathology results from your procedure in your [facility]   account before your physician is able to contact you. Your physician or   their representative will relay the results to you with their   recommendations at their soonest availability.  Thank you,  RESTRICTIONS:  During your procedure today, you received medications for sedation.  These   medications may affect your judgment, balance and coordination.  Therefore,   for 24 hours, you have the following restrictions:   - DO NOT drive a car, operate machinery, make legal/financial decisions,   sign important papers or drink alcohol.    ACTIVITY:  Today: no heavy lifting, straining or running due to procedural   sedation/anesthesia.  The following day: return to full activity including work.  DIET:  Eat and drink normally unless instructed otherwise.     TREATMENT FOR COMMON SIDE EFFECTS:  - Mild abdominal pain, nausea, belching, bloating or excessive gas:  rest,   eat lightly and use a heating pad.  - Sore Throat: treat with throat lozenges and/or gargle with warm salt   water.  - Because air was used during the procedure, expelling large amounts of air   from your rectum or belching is normal.  - If a bowel prep was taken, you may not have a bowel movement for 1-3 days.    This is normal.  SYMPTOMS TO WATCH FOR AND REPORT TO YOUR PHYSICIAN:  1. Abdominal pain or bloating, other than gas cramps.  2. Chest pain.  3. Back pain.  4. Signs of infection such as: chills or fever occurring within 24 hours   after the procedure.  5. Rectal bleeding, which would show as bright red, maroon, or black stools.   (A tablespoon of blood from the rectum is not serious, especially if    hemorrhoids are present.)  6. Vomiting.  7. Weakness or dizziness.  GO DIRECTLY TO THE NEAREST EMERGENCY ROOM IF YOU HAVE ANY OF THE FOLLOWING:      Difficulty breathing              Chills and/or fever over 101 F   Persistent vomiting and/or vomiting blood   Severe abdominal pain   Severe chest pain   Black, tarry stools   Bleeding- more than one tablespoon   Any other symptom or condition that you feel may need urgent attention  Your doctor recommends these additional instructions:  If any biopsies were taken, your doctors clinic will contact you in 1 to 2   weeks with any results.  - [facility] to [facility] for [facility].   - Resume regular diet.   - Continue present medications.   - Repeat colonoscopy at appointment to be scheduled per protocol.  For questions, problems or results please call your physician - Jarett Hill MD at Work:  [phone].  [facility], EMERGENCY ROOM PHONE NUMBER: [phone]  IF A COMPLICATION OR EMERGENCY SITUATION ARISES AND YOU ARE UNABLE TO REACH   YOUR PHYSICIAN - GO DIRECTLY TO THE EMERGENCY ROOM.  Jarett Hill MD  4/16/2024 10:19:22 AM  This report has been verified and signed electronically.  Dear patient,  As a result of recent federal legislation (The Federal Cures Act), you may   receive lab or pathology results from your procedure in your [facility]   account before your physician is able to contact you. Your physician or   their representative will relay the results to you with their   recommendations at their soonest availability.  Thank you,  PROVATION

## 2024-04-16 NOTE — ANESTHESIA PREPROCEDURE EVALUATION
Pre-operative evaluation for Procedure(s) (LRB):  COLONOSCOPY (N/A)    Catalino Mcfadden is a 59 y.o. male     Patient Active Problem List   Diagnosis    Type 2 diabetes mellitus with stage 5 chronic kidney disease    HTN (hypertension)    Hyperlipidemia, acquired    ED (erectile dysfunction)    History of diabetic ulcer of foot    Osteomyelitis of second toe of left foot    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    Long term (current) use of antibiotics    Acute osteomyelitis of metatarsal bone of left foot    Diabetic ulcer of toe of left foot    Hyponatremia    Osteomyelitis of left foot    Hypoalbuminemia    Healed ulcer of left foot on examination    Iron deficiency anemia    Metabolic acidosis    Anemia due to chronic kidney disease, on chronic dialysis    Diabetic ulcer of left midfoot associated with type 2 diabetes mellitus, with fat layer exposed    Type II diabetes mellitus with neurological manifestations    Foot ulcer, right, with fat layer exposed    Foot ulceration, left, with fat layer exposed    ESRD on hemodialysis    Malignant renovascular hypertension    Calcified granuloma of lung    Abnormal findings on diagnostic imaging of lung    Tracheobronchomalacia    Intractable hiccups    Serum total bilirubin elevated    Lower extremity weakness    Abnormal MRI, lumbar spine    Goals of care, counseling/discussion    DJD (degenerative joint disease)    Complete lesion of L1 level of lumbar spinal cord    Debility    Diffuse lymphadenopathy    Lytic bone lesions on xray    Hepatic lesion    Splenic lesion    Moderate protein-calorie malnutrition    GI bleed    Acute blood loss anemia    Hypokalemia       Review of patient's allergies indicates:  No Known Allergies    No current facility-administered medications on file prior to encounter.     Current Outpatient Medications on File Prior to Encounter   Medication Sig Dispense  Refill    hydrALAZINE (APRESOLINE) 100 MG tablet Take 1 tablet (100 mg total) by mouth 3 (three) times daily. 270 tablet 3    NIFEdipine (PROCARDIA XL) 90 MG (OSM) 24 hr tablet Take 1 tablet (90 mg total) by mouth once daily. 90 tablet 3    imipramine (TOFRANIL) 25 MG tablet Take 1 tablet (25 mg total) by mouth every evening. 30 tablet 11    oxyCODONE-acetaminophen (PERCOCET) 5-325 mg per tablet Take 1 tablet by mouth every 8 (eight) hours as needed for Pain. 20 tablet 0       Past Surgical History:   Procedure Laterality Date    AV FISTULA PLACEMENT Left 07/06/2023    Procedure: CREATION, AV FISTULA;  Surgeon: Daniel Poon MD;  Location: [facility] OR;  Service: Vascular;  Laterality: Left;  RN PREOP 6/28/2023  LABS DAY OF SURGERY    BONE BIOPSY Left 07/17/2020    Procedure: BIOPSY, BONE;  Surgeon: Verona Whitmore DPM;  Location: [facility] OR;  Service: Podiatry;  Laterality: Left;    CERVICAL DISC SURGERY  07/11/2016    COLONOSCOPY N/A 3/25/2024    Procedure: COLONOSCOPY;  Surgeon: Roopa Pérez MD;  Location: [facility] (4TH FLR);  Service: Endoscopy;  Laterality: N/A;  Ref By: Dr. Pérez   constipation prep  Diaylsis Patient Lab has been ordered  3/20-precall complete-MS    COLONOSCOPY N/A 4/8/2024    Procedure: COLONOSCOPY;  Surgeon: Roopa Pérez MD;  Location: [facility] (2ND FLR);  Service: Endoscopy;  Laterality: N/A;  Prep instructions sent via portal/given to pt in clinic  pt had to be r/s at  request  Dialysis Irene Arguello,Sat-dw  Peg Prep-dw  4/2/24- Labs - dialysis /poor prep on 3/26 - PEG x 2 - extended prep /LVM for precall - ERW  4/5-LVM for precall-KPvt    DEBRIDEMENT Left 07/17/2020    Procedure: DEBRIDEMENT;  Surgeon: Verona Whitmore DPM;  Location: [facility] OR;  Service: Podiatry;  Laterality: Left;    DEBRIDEMENT OF FOOT Right     toes & plantar surface    ENDOBRONCHIAL ULTRASOUND N/A 3/1/2024    Procedure: ENDOBRONCHIAL ULTRASOUND (EBUS);  Surgeon: Francisco Fleming MD;  Location:  [facility] OR 2ND FLR;  Service: Pulmonary;  Laterality: N/A;    ESOPHAGEAL MANOMETRY WITH MEASUREMENT OF IMPEDANCE N/A 03/27/2023    Procedure: MANOMETRY, ESOPHAGUS, WITH IMPEDANCE MEASUREMENT;  Surgeon: Roopa Pérez MD;  Location: [facility] ENDO (4TH FLR);  Service: Endoscopy;  Laterality: N/A;  Belching and rumination protocol please  instructions via portal - sm    ESOPHAGOGASTRODUODENOSCOPY N/A 12/16/2022    Procedure: EGD (ESOPHAGOGASTRODUODENOSCOPY);  Surgeon: Eren Francois MD;  Location: [facility] ENDO;  Service: Endoscopy;  Laterality: N/A;  Pt. on Dialysis. K+ ordered prior to procedure.EC    ESOPHAGOGASTRODUODENOSCOPY N/A 12/5/2023    Procedure: EGD (ESOPHAGOGASTRODUODENOSCOPY);  Surgeon: Kash Johnsotn MD;  Location: [facility];  Service: Endoscopy;  Laterality: N/A;    FRACTURE SURGERY Right     medial ankle, metal plate present    INJECTION OF ANESTHETIC AGENT AROUND NERVE Right 2/2/2024    Procedure: BLOCK, NERVE STELLATE GANGLION *HOLD ASPIRIN 5 DAYS PRIOR*;  Surgeon: Gokul Gonzalez MD;  Location: [facility] PAIN MGT;  Service: Pain Management;  Laterality: Right;  [phone]    INSERTION OF TUNNELED CENTRAL VENOUS CATHETER (CVC) WITH SUBCUTANEOUS PORT N/A 06/11/2021    Procedure: TUNNEL CATH INSERTION WITHOUT PORT;  Surgeon: Dosnadeem Diagnostic Provider;  Location: [facility] OR;  Service: Radiology;  Laterality: N/A;  11AM START---PHONE PREOP 6/10/21---COVID POSTIVE ON 7/2020---NO S/S    left leg orthopedic surgery Left     REVISION OF ARTERIOVENOUS FISTULA Left 3/14/2024    Procedure: REVISION, AV FISTULA;  Surgeon: Daniel Poon MD;  Location: [facility] OR;  Service: Vascular;  Laterality: Left;  RN preop 3/6/2024----NEED ORDERS    UPPER GASTROINTESTINAL ENDOSCOPY         Social History     Socioeconomic History    Marital status: Other    Number of children: 1   Tobacco Use    Smoking status: Never    Smokeless tobacco: Never   Substance and Sexual Activity    Alcohol use: Not Currently     Comment: occ  "rare beer    Drug use: No    Sexual activity: Not Currently   Social History Narrative    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     Social Determinants of Health     Financial Resource Strain: Patient Declined (2/21/2024)    Overall Financial Resource Strain (CARDIA)     Difficulty of Paying Living Expenses: Patient declined   Food Insecurity: Unknown (2/21/2024)    Hunger Vital Sign     Worried About Running Out of Food in the Last Year: Patient declined   Transportation Needs: Unknown (2/21/2024)    PRAPARE - Transportation     Lack of Transportation (Medical): Patient declined   Physical Activity: Unknown (2/21/2024)    Exercise Vital Sign     Days of Exercise per Week: 2 days   Stress: Stress Concern Present (9/26/2022)    [employer] [place] [employer] - Occupational Stress Questionnaire     Feeling of Stress : To some extent   Social Connections: Unknown (2/21/2024)    Social Connection and Isolation Panel [NHANES]     Frequency of Communication with Friends and Family: Patient declined     Active Member of Clubs or Organizations: Patient declined     Attends Club or Organization Meetings: 1 to 4 times per year   Housing Stability: Low Risk  (9/26/2022)    Housing Stability Vital Sign     Unable to Pay for Housing in the Last Year: No     Number of Places Lived in the Last Year: 1     Unstable Housing in the Last Year: No         CBC:   Recent Labs     04/16/24  0004 04/16/24  0233   WBC 5.28 5.72   RBC 2.71* 2.71*   HGB 8.1* 8.3*   HCT 24.3* 24.9*    174   MCV 90 92   MCH 29.9 30.6   MCHC 33.3 33.3       CMP:   Recent Labs     04/15/24  1613 04/16/24  0233   * 127*   K 3.3* 3.5   CL 96 95   CO2 24 22*   BUN 32* 34*   CREATININE 5.1* 5.7*   GLU 81 90   MG 1.8 1.9   PHOS  --  3.3   CALCIUM 8.4* 9.0   ALBUMIN 2.4* 2.6*   PROT 5.8* 6.2   ALKPHOS 435* 474*   ALT 26 28   AST 37 44*   BILITOT 0.9 1.2*       INR  No results for input(s): "PT", "INR", "PROTIME", "APTT" in the last 72 " hours.        Diagnostic Studies:      EKD Echo:  No results found. However, due to the size of the patient record, not all encounters were searched. Please check Results Review for a complete set of results.      Pre-op Assessment    I have reviewed the Patient Summary Reports.     I have reviewed the Nursing Notes. I have reviewed the NPO Status.   I have reviewed the Medications.     Review of Systems  Cardiovascular:     Hypertension                                        Renal/:  Chronic Renal Disease, ESRD, Dialysis                Hepatic/GI:      Liver Disease,            Musculoskeletal:  Arthritis               Endocrine:  Diabetes         Obesity / BMI > 30      Physical Exam  General: Cooperative, Alert and Oriented    Airway:  Mallampati: II   Mouth Opening: Normal  TM Distance: Normal  Tongue: Normal  Neck ROM: Normal ROM    Dental:  Dentures  Many teeth missing. Poor dentition.       Anesthesia Plan  Type of Anesthesia, risks & benefits discussed:    Anesthesia Type: Gen Natural Airway  Intra-op Monitoring Plan: Standard ASA Monitors  Induction:  IV  Informed Consent: Informed consent signed with the Patient and all parties understand the risks and agree with anesthesia plan.  All questions answered.   ASA Score: 3  Anesthesia Plan Notes: Asymptomatic hypertension- systolic 220s. On the floor, he has been running 190s to low 200s  Will not treat preoperatively with plan to treat in recovery if remaining elevated after anesthesia.    Ready For Surgery From Anesthesia Perspective.     .

## 2024-04-16 NOTE — ASSESSMENT & PLAN NOTE
RESOLVED  Patient has hypokalemia which is Acute and currently uncontrolled. Most recent potassium levels reviewed-   Lab Results   Component Value Date    K 3.5 04/16/2024   . Will continue potassium replacement per protocol and recheck repeat levels after replacement completed.

## 2024-04-16 NOTE — SUBJECTIVE & OBJECTIVE
Past Medical History:   Diagnosis Date    Cancer     Diabetes mellitus, type 2     Diabetic foot ulcer associated with diabetes mellitus due to underlying condition 07/16/2020    ESRD on hemodialysis     Hyperlipidemia     Hypertension     Obese        Past Surgical History:   Procedure Laterality Date    AV FISTULA PLACEMENT Left 07/06/2023    Procedure: CREATION, AV FISTULA;  Surgeon: Daniel Poon MD;  Location: [facility] OR;  Service: Vascular;  Laterality: Left;  RN PREOP 6/28/2023  LABS DAY OF SURGERY    BONE BIOPSY Left 07/17/2020    Procedure: BIOPSY, BONE;  Surgeon: Verona Whitmore DPM;  Location: [facility] OR;  Service: Podiatry;  Laterality: Left;    CERVICAL DISC SURGERY  07/11/2016    COLONOSCOPY N/A 3/25/2024    Procedure: COLONOSCOPY;  Surgeon: Roopa Pérez MD;  Location: [facility] (4TH FLR);  Service: Endoscopy;  Laterality: N/A;  Ref By: Dr. Pérez   constipation prep  Diaylsis Patient Lab has been ordered  3/20-precall complete-MS    COLONOSCOPY N/A 4/8/2024    Procedure: COLONOSCOPY;  Surgeon: Roopa Pérez MD;  Location: [facility] (2ND FLR);  Service: Endoscopy;  Laterality: N/A;  Prep instructions sent via portal/given to pt in clinic  pt had to be r/s at  request  Dialysis Tues,Thurs,Sat-dw  Peg Prep-dw  4/2/24- Labs - dialysis /poor prep on 3/26 - PEG x 2 - extended prep /LVM for precall - ERW  4/5-LVM for precall-KPvt    DEBRIDEMENT Left 07/17/2020    Procedure: DEBRIDEMENT;  Surgeon: Verona Whitmore DPM;  Location: [facility] OR;  Service: Podiatry;  Laterality: Left;    DEBRIDEMENT OF FOOT Right     toes & plantar surface    ENDOBRONCHIAL ULTRASOUND N/A 3/1/2024    Procedure: ENDOBRONCHIAL ULTRASOUND (EBUS);  Surgeon: Francisco Fleming MD;  Location: [facility] 2ND FLR;  Service: Pulmonary;  Laterality: N/A;    ESOPHAGEAL MANOMETRY WITH MEASUREMENT OF IMPEDANCE N/A 03/27/2023    Procedure: MANOMETRY, ESOPHAGUS, WITH IMPEDANCE MEASUREMENT;  Surgeon: Roopa Pérez MD;   Location: [facility] ENDO (4TH FLR);  Service: Endoscopy;  Laterality: N/A;  Belching and rumination protocol please  instructions via portal - sm    ESOPHAGOGASTRODUODENOSCOPY N/A 12/16/2022    Procedure: EGD (ESOPHAGOGASTRODUODENOSCOPY);  Surgeon: Eren Francois MD;  Location: [facility] ENDO;  Service: Endoscopy;  Laterality: N/A;  Pt. on Dialysis. K+ ordered prior to procedure.EC    ESOPHAGOGASTRODUODENOSCOPY N/A 12/5/2023    Procedure: EGD (ESOPHAGOGASTRODUODENOSCOPY);  Surgeon: Kash Johnston MD;  Location: [facility] ENDO;  Service: Endoscopy;  Laterality: N/A;    FRACTURE SURGERY Right     medial ankle, metal plate present    INJECTION OF ANESTHETIC AGENT AROUND NERVE Right 2/2/2024    Procedure: BLOCK, NERVE STELLATE GANGLION *HOLD ASPIRIN 5 DAYS PRIOR*;  Surgeon: Gokul Gonzalez MD;  Location: [facility] PAIN MGT;  Service: Pain Management;  Laterality: Right;  [phone]    INSERTION OF TUNNELED CENTRAL VENOUS CATHETER (CVC) WITH SUBCUTANEOUS PORT N/A 06/11/2021    Procedure: TUNNEL CATH INSERTION WITHOUT PORT;  Surgeon: Dosc Diagnostic Provider;  Location: [facility] OR;  Service: Radiology;  Laterality: N/A;  11AM START---PHONE PREOP 6/10/21---COVID POSTIVE ON 7/2020---NO S/S    left leg orthopedic surgery Left     REVISION OF ARTERIOVENOUS FISTULA Left 3/14/2024    Procedure: REVISION, AV FISTULA;  Surgeon: Daniel Poon MD;  Location: [facility] OR;  Service: Vascular;  Laterality: Left;  RN preop 3/6/2024----NEED ORDERS    UPPER GASTROINTESTINAL ENDOSCOPY         Review of patient's allergies indicates:  No Known Allergies  Family History       Problem Relation (Age of Onset)    Heart disease Father          Tobacco Use    Smoking status: Never    Smokeless tobacco: Never   Substance and Sexual Activity    Alcohol use: Not Currently     Comment: occ rare beer    Drug use: No    Sexual activity: Not Currently     Review of Systems   Constitutional:  Negative for chills and fever.   HENT:  Negative for congestion and  sore throat.    Eyes:  Negative for photophobia and visual disturbance.   Respiratory:  Negative for cough and shortness of breath.    Cardiovascular:  Negative for chest pain and palpitations.   Gastrointestinal:  Positive for blood in stool. Negative for abdominal pain.   Genitourinary:  Negative for dysuria and hematuria.   Musculoskeletal:  Negative for arthralgias and back pain.   Skin:  Negative for rash and wound.   Neurological:  Negative for dizziness and syncope.   Psychiatric/Behavioral:  Negative for agitation and behavioral problems.      Objective:     Vital Signs (Most Recent):  Temp: 98.1 °F (36.7 °C) (04/16/24 0716)  Pulse: 70 (04/16/24 0716)  Resp: 16 (04/16/24 0716)  BP: (!) 172/84 (04/16/24 0716)  SpO2: 98 % (04/16/24 0716) Vital Signs (24h Range):  Temp:  [97.5 °F (36.4 °C)-98.7 °F (37.1 °C)] 98.1 °F (36.7 °C)  Pulse:  [67-94] 70  Resp:  [13-20] 16  SpO2:  [97 %-100 %] 98 %  BP: (131-215)/() 172/84     Weight: 93.5 kg (206 lb 2.1 oz) (04/15/24 2136)  Body mass index is 26.83 kg/m².      Intake/Output Summary (Last 24 hours) at 4/16/2024 0854  Last data filed at 4/15/2024 1923  Gross per 24 hour   Intake 500 ml   Output --   Net 500 ml       Lines/Drains/Airways       Central Venous Catheter Line  Duration                  Hemodialysis Catheter right subclavian -- days              Peripheral Intravenous Line  Duration                  Hemodialysis AV Fistula Left upper arm -- days         Peripheral IV - Single Lumen 04/15/24 1411 20 G Anterior;Distal;Right Upper Arm <1 day                     Physical Exam  Vitals reviewed.   Constitutional:       General: He is not in acute distress.     Appearance: Normal appearance.   HENT:      Head: Normocephalic and atraumatic.   Cardiovascular:      Rate and Rhythm: Normal rate and regular rhythm.   Pulmonary:      Effort: Pulmonary effort is normal. No respiratory distress.   Abdominal:      Palpations: Abdomen is soft.      Tenderness: There is  no abdominal tenderness.   Musculoskeletal:         General: No swelling or tenderness.      Cervical back: Full passive range of motion without pain and normal range of motion.   Skin:     General: Skin is warm and dry.   Neurological:      General: No focal deficit present.      Mental Status: He is alert and oriented to person, place, and time.   Psychiatric:         Mood and Affect: Mood normal.         Behavior: Behavior normal.          Significant Labs:  All pertinent lab results from the last 24 hours have been reviewed.    Significant Imaging:  Imaging results within the past 24 hours have been reviewed.

## 2024-04-16 NOTE — PROGRESS NOTES
3.5hr HD treatment completed 2L of fluid removed pt tolerated well. Both lumens of a RIJ permcath instilled with heparin,capped,and wrapped in gauze. Central line dressing changed per protocol. Report given to JUAN J Angeles.

## 2024-04-16 NOTE — ED NOTES
Telemetry Verification   Patient placed on Telemetry Box  Verified with War Room  Box # [id number]   Monitor Tech War room   Rate    Rhythm

## 2024-04-16 NOTE — HPI
The patient is a 59 y.o. Black or  Male with multiple co morbidities including ESRD on HD TThS, HTN, T2DM with neuropathy and gastroparesis who presents to ED on 4/15/2024 with GIB. He endorses bloody, diarrhea and lightheadedness since Friday, 4/12. Denies headaches, visual changes, SOB, cough, chest pain, palpitation, nausea, vomiting, abdominal pain, constipation, urinary frequency or any weakness. In the ED, the pt was afebrile and hypertensive with BP of 183/84 and HR 74. Labwork remarkable for Hgb dropping from 11.5 to 7.5, WBC 3.62. CMP remarkable for hypokalemia of 3.0, Creatinine 4.9. ALP elevated at 439. One unit of PRBC ordered GI services consulted and pt was admitted to hospital medicine for further management of GI bleed. Nephrology consulted for management of ESRD and HD treatment.

## 2024-04-16 NOTE — ED PROVIDER NOTES
Encounter Date: 4/15/2024       History     Chief Complaint   Patient presents with    Rectal Bleeding     Pt reports rectal bleeding x1 week + had colonoscopy on 4/8/24. Endorsing dizziness. Told to come to ED for further evaluation. Abnormal H+H levels.     Pt is a 59 male Past Medical History:  No date: Cancer  No date: Diabetes mellitus, type 2  07/16/2020: Diabetic foot ulcer associated with diabetes mellitus due   to underlying condition  No date: ESRD on hemodialysis  No date: Hyperlipidemia  No date: Hypertension  No date: Obese  T,TH,Sat dialysis  Presenting to the ED for rectal bleeding. He reports having 4-5 dark clotted, maroon stools dailys for 5d. Episodes began shortly after colonoscopy with 4 polyps removed. Does not take blood thinners, continued taking hydralazine and other BP meds and today has felt very lightheaded, whoozy. Denies CP, SOB, abd pain, NVD, urinary changes, weakness or change in sensation in any extremeties. Reports full round of dialysis on Saturday.       Review of patient's allergies indicates:  No Known Allergies  Past Medical History:   Diagnosis Date    Cancer     Diabetes mellitus, type 2     Diabetic foot ulcer associated with diabetes mellitus due to underlying condition 07/16/2020    ESRD on hemodialysis     Hyperlipidemia     Hypertension     Obese      Past Surgical History:   Procedure Laterality Date    AV FISTULA PLACEMENT Left 07/06/2023    Procedure: CREATION, AV FISTULA;  Surgeon: Daniel Poon MD;  Location: [facility] OR;  Service: Vascular;  Laterality: Left;  RN PREOP 6/28/2023  LABS DAY OF SURGERY    BONE BIOPSY Left 07/17/2020    Procedure: BIOPSY, BONE;  Surgeon: Verona Whitmore DPM;  Location: [facility] OR;  Service: Podiatry;  Laterality: Left;    CERVICAL DISC SURGERY  07/11/2016    COLONOSCOPY N/A 3/25/2024    Procedure: COLONOSCOPY;  Surgeon: Roopa Pérez MD;  Location: [facility] ([address] [place] [address]);  Service: Endoscopy;  Laterality: N/A;  Ref By: Dr. Pérez    constipation prep  Diaylsis Patient Lab has been ordered  3/20-precall complete-MS    COLONOSCOPY N/A 4/8/2024    Procedure: COLONOSCOPY;  Surgeon: Roopa Pérez MD;  Location: [facility] (2ND FLR);  Service: Endoscopy;  Laterality: N/A;  Prep instructions sent via portal/given to pt in clinic  pt had to be r/s at  request  Dialysis Tues,Thurs,Sat-dw  Peg Prep-dw  4/2/24- Labs - dialysis /poor prep on 3/26 - PEG x 2 - extended prep /LVM for precall - ERW  4/5-LVM for precall-KPvt    DEBRIDEMENT Left 07/17/2020    Procedure: DEBRIDEMENT;  Surgeon: Verona Whitmore DPM;  Location: [facility];  Service: Podiatry;  Laterality: Left;    DEBRIDEMENT OF FOOT Right     toes & plantar surface    ENDOBRONCHIAL ULTRASOUND N/A 3/1/2024    Procedure: ENDOBRONCHIAL ULTRASOUND (EBUS);  Surgeon: Francisco Fleming MD;  Location: [facility] 2ND FLR;  Service: Pulmonary;  Laterality: N/A;    ESOPHAGEAL MANOMETRY WITH MEASUREMENT OF IMPEDANCE N/A 03/27/2023    Procedure: MANOMETRY, ESOPHAGUS, WITH IMPEDANCE MEASUREMENT;  Surgeon: Roopa Pérez MD;  Location: [facility] (4TH FLR);  Service: Endoscopy;  Laterality: N/A;  Belching and rumination protocol please  instructions via portal - sm    ESOPHAGOGASTRODUODENOSCOPY N/A 12/16/2022    Procedure: EGD (ESOPHAGOGASTRODUODENOSCOPY);  Surgeon: Eren Francois MD;  Location: [facility];  Service: Endoscopy;  Laterality: N/A;  Pt. on Dialysis. K+ ordered prior to procedure.EC    ESOPHAGOGASTRODUODENOSCOPY N/A 12/5/2023    Procedure: EGD (ESOPHAGOGASTRODUODENOSCOPY);  Surgeon: Kash Johnston MD;  Location: [facility];  Service: Endoscopy;  Laterality: N/A;    FRACTURE SURGERY Right     medial ankle, metal plate present    INJECTION OF ANESTHETIC AGENT AROUND NERVE Right 2/2/2024    Procedure: BLOCK, NERVE STELLATE GANGLION *HOLD ASPIRIN 5 DAYS PRIOR*;  Surgeon: Gokul Gonzalez MD;  Location: [facility];  Service: Pain Management;  Laterality: Right;  [phone]     INSERTION OF TUNNELED CENTRAL VENOUS CATHETER (CVC) WITH SUBCUTANEOUS PORT N/A 06/11/2021    Procedure: TUNNEL CATH INSERTION WITHOUT PORT;  Surgeon: Jose Manuel Diagnostic Provider;  Location: [facility] OR;  Service: Radiology;  Laterality: N/A;  11AM START---PHONE PREOP 6/10/21---COVID POSTIVE ON 7/2020---NO S/S    left leg orthopedic surgery Left     REVISION OF ARTERIOVENOUS FISTULA Left 3/14/2024    Procedure: REVISION, AV FISTULA;  Surgeon: Daniel Poon MD;  Location: [facility] OR;  Service: Vascular;  Laterality: Left;  RN preop 3/6/2024----NEED ORDERS    UPPER GASTROINTESTINAL ENDOSCOPY       Family History   Adopted: Yes   Problem Relation Name Age of Onset    Heart disease Father      Colon cancer Neg Hx      Esophageal cancer Neg Hx      Colon polyps Neg Hx      Stomach cancer Neg Hx       Social History     Tobacco Use    Smoking status: Never    Smokeless tobacco: Never   Substance Use Topics    Alcohol use: Not Currently     Comment: occ rare beer    Drug use: No     Review of Systems   Neurological:  Positive for light-headedness.       Physical Exam     Initial Vitals [04/15/24 1245]   BP Pulse Resp Temp SpO2   131/68 84 18 98.3 °F (36.8 °C) 100 %      MAP       --         Physical Exam    Nursing note and vitals reviewed.  Constitutional: Vital signs are normal. He appears well-nourished.   HENT:   Head: Normocephalic and atraumatic.   Eyes: Conjunctivae and EOM are normal.   Neck: Neck supple. No thyroid mass present.   Normal range of motion.  Cardiovascular:  Normal rate, regular rhythm and normal heart sounds.     Exam reveals no gallop and no friction rub.       No murmur heard.  Pulmonary/Chest: Breath sounds normal. He has no wheezes. He has no rhonchi. He has no rales.   Abdominal: Abdomen is soft. Bowel sounds are normal. There is no abdominal tenderness.   Genitourinary: Rectum:      Guaiac result positive.   Guaiac positive stool. : Acceptable.  Musculoskeletal:         General:  No edema.      Cervical back: Normal range of motion and neck supple.      Comments: LUE AV fist with palp thrill     Neurological: He is alert and oriented to person, place, and time. He has normal strength. No cranial nerve deficit or sensory deficit. GCS score is 15. GCS eye subscore is 4. GCS verbal subscore is 5. GCS motor subscore is 6.   Skin: Skin is warm and dry. No rash noted. No erythema.   Psychiatric: He has a normal mood and affect. His speech is normal. Cognition and memory are normal.         ED Course   Procedures  Labs Reviewed   CBC W/ AUTO DIFFERENTIAL - Abnormal; Notable for the following components:       Result Value    WBC 3.62 (*)     RBC 2.44 (*)     Hemoglobin 7.3 (*)     Hematocrit 22.4 (*)     RDW 15.5 (*)     Lymph # 0.5 (*)     Lymph % 14.9 (*)     All other components within normal limits   COMPREHENSIVE METABOLIC PANEL - Abnormal; Notable for the following components:    Sodium 128 (*)     Potassium 3.3 (*)     BUN 32 (*)     Creatinine 5.1 (*)     Calcium 8.4 (*)     Total Protein 5.8 (*)     Albumin 2.4 (*)     Alkaline Phosphatase 435 (*)     eGFR 12.2 (*)     All other components within normal limits   MAGNESIUM   TYPE & SCREEN   PREPARE RBC SOFT        ECG Results              EKG 12-lead (Final result)        Collection Time Result Time QRS Duration OHS QTC Calculation    04/15/24 15:18:39 04/15/24 16:38:22 80 429                     Final result by Interface, Lab In [facility] (04/15/24 16:38:26)                   Narrative:    Test Reason : R42,    Vent. Rate : 072 BPM     Atrial Rate : 072 BPM     P-R Int : 136 ms          QRS Dur : 080 ms      QT Int : 392 ms       P-R-T Axes : 038 000 068 degrees     QTc Int : 429 ms    Normal sinus rhythm  Normal ECG  When compared with ECG of 11-DEC-2023 15:34,  No significant change was found  Confirmed by Nadir Flores MD ([id number]) on 4/15/2024 4:38:20 PM    Referred By: FELICIA   SELF           Confirmed By:Nadir Flores MD                                   Imaging Results    None          Medications   sodium chloride 0.9% flush 10 mL (has no administration in time range)   naloxone 0.4 mg/mL injection 0.02 mg (has no administration in time range)   glucose chewable tablet 16 g (has no administration in time range)   glucose chewable tablet 24 g (has no administration in time range)   glucagon (human recombinant) injection 1 mg (has no administration in time range)   dextrose 10% bolus 125 mL 125 mL (has no administration in time range)   dextrose 10% bolus 250 mL 250 mL (has no administration in time range)   imipramine tablet 25 mg (25 mg Oral Not Given 4/15/24 2100)   potassium chloride SA CR tablet 40 mEq (40 mEq Oral Not Given 4/15/24 2252)   prochlorperazine tablet 5 mg (5 mg Oral Given 4/16/24 0322)   0.9%  NaCl infusion (for blood administration) (has no administration in time range)   hydrALAZINE injection 10 mg (10 mg Intravenous Given 4/16/24 0512)   NIFEdipine 24 hr tablet 90 mg (90 mg Oral Given 4/16/24 1125)   dicyclomine capsule 10 mg (10 mg Oral Given 4/15/24 1809)   pantoprazole injection 80 mg (80 mg Intravenous Given 4/15/24 1955)   polyethylene glycol (GoLYTELY) solution (4,000 mLs Oral Given 4/15/24 1953)   0.9%  NaCl infusion ( Intravenous Anesthesia Volume Adjustment 4/16/24 1000)     Medical Decision Making  Pt rapidly assessed. VSS. Concern for ongoing LGIB. T&S secured. H/H dropped to 7.3 from baseline above 11. CMP c/w ESRD. Pt consented by me and will transfuse one unit for severe anemic symptoms, susp continued bleeding. Gastro consulted and discussed case, they will follow. Pt primarily accepted by on call hospitalist. Pt in agreement with POC.     Amount and/or Complexity of Data Reviewed  Labs: ordered.    Risk  Decision regarding hospitalization.              Attending Attestation:         Attending Critical Care:   Critical Care Times:   Direct Patient Care (initial evaluation, reassessments, and time  considering the case)................................................................15 minutes.   Additional History from reviewing old medical records or taking additional history from the family, EMS, PCP, etc.......................5 minutes.   Ordering, Reviewing, and Interpreting Diagnostic Studies...............................................................................................................5 minutes.   Documentation..................................................................................................................................................................................5 minutes.   Consultation with other Physicians. .................................................................................................................................................10 minutes.   Consultation with the patient's family directly relating to the patient's condition, care, and DNR status (when patient unable)......0 minutes.   Other..................................................................................................................................................................................................5 minutes.   ==============================================================  Total Critical Care Time - exclusive of procedural time: 45 minutes.  ==============================================================  Critical care was necessary to treat or prevent imminent or life-threatening deterioration of the following conditions: GI bleeding.   The following critical care procedures were done by me (see procedure notes): pulse oximetry.   Critical care was time spent personally by me on the following activities: obtaining history from patient or relative, examination of patient, review of old charts, ordering lab, x-rays, and/or EKG, development of treatment plan with patient or relative, ordering and performing treatments and interventions, evaluation of patient's  response to treatment, discussions with primary provider, interpretation of cardiac measurements, re-evaluation of patient's conition and discussion with consultants.   Critical Care Condition: potentially life-threatening                                  Clinical Impression:  Final diagnoses:  [R42] Dizziness          ED Disposition Condition    Admit                 Nikhil Chong MD  04/16/24 1148

## 2024-04-16 NOTE — ASSESSMENT & PLAN NOTE
Patient's anemia is currently uncontrolled. Has received 1 units of PRBCs on 04/15/24 . Etiology likely d/t acute blood loss which was from GI bleed  Current CBC reviewed-   Lab Results   Component Value Date    HGB 7.1 (L) 04/16/2024    HCT 21.2 (L) 04/16/2024     Monitor serial CBC and transfuse if patient becomes hemodynamically unstable, symptomatic or H/H drops below 7/21.  Please see GI bleed

## 2024-04-16 NOTE — ANESTHESIA POSTPROCEDURE EVALUATION
Anesthesia Post Evaluation    Patient: Catalino Mcfadden    Procedure(s) Performed: Procedure(s) (LRB):  COLONOSCOPY (N/A)    Final Anesthesia Type: general (Natural airway)      Patient location during evaluation: PACU  Patient participation: Yes- Able to Participate  Level of consciousness: awake and alert  Post-procedure vital signs: reviewed and stable  Pain management: adequate  Airway patency: patent    PONV status at discharge: No PONV  Anesthetic complications: no      Cardiovascular status: hemodynamically stable  Respiratory status: unassisted  Hydration status: euvolemic  Follow-up not needed.              Vitals Value Taken Time   /93 04/16/24 1032   Temp 36.8 °C (98.2 °F) 04/16/24 1015   Pulse 67 04/16/24 1034   Resp 15 04/16/24 1034   SpO2 100 % 04/16/24 1034   Vitals shown include unfiled device data.      Event Time   Out of Recovery 10:34:42         Pain/Madiha Score: Madiha Score: 9 (4/16/2024 10:15 AM)

## 2024-04-16 NOTE — ASSESSMENT & PLAN NOTE
59M w/hx of T2DM, ESRD, recent colonoscopy with polypectomy who p/w active hematochezia. Patient had colonoscopy on 4/8 and on 4/12 developed multiple episodes of bloody bowel movements that have been persistent and daily. He had 4 polyps resected on colonoscopy, with two 30mm polyps resected via hot snare. No prior hx of GI bleeding. Now with four point drop in Hgb. Suspect cause of current hematochezia is due to post-polypectomy bleeding.    Recommendations:  - Plan for Colonoscopy on 4/16, patient completed bowel prep on 4/15.  - Trend CBCs  - Maintain adequate IV access, volume resuscitation as needed  - Transfuse pRBC if Hb < 7 g/dL  - Correct coagulopathy (goal plt >50, INR <1.5) if present in patients without absolute contraindications.  - Avoid NSAIDs, antiplatelet agents and anticoagulants, unless absolutely necessary

## 2024-04-16 NOTE — PROGRESS NOTES
[facility] Medicine  Progress Note    Patient Name: Catalino Mcfadden  MRN: [id number]  Patient Class: IP- Inpatient   Admission Date: 4/15/2024  Length of Stay: 1 days  Attending Physician: Tonie Lorenz MD  Primary Care Provider: Lizbeth, Primary Doctor        Subjective:     Principal Problem:GI bleed        HPI:  Mr. Mcfadden is a 60 yo M with ESRD on HD TThS, HTN, T2DM with neuropathy and gastroparesis who presented to the ED after being sent from Heme/onc clinic due to bloody bowel movement. Symptoms started last Friday 04/12/24 when he started having bloody diarrhea and started feeling lightheaded and weak. He treat the symptoms with imodium and pepto bismol. Pt also reports loss of appetite for the past year, and loss of 100lbs.  Denies headaches, visual changes, SOB, cough, chest pain, palpitation, nausea, vomiting, abdominal pain, constipation, urinary frequency or any weakness. Per chart review, the pt had a colonoscopy on 04/08/24, five polyps were identified of which 4 were removed and sent for pathology. Pt is being work up for possible metastatic cancer of unknown primary source. PET scan done in Jan 24 showed widespread hypermetabolic adenopathy, innumerable foci within the liver, splenic lesions, bone lesions, uptake in the transverse colon, and diffuse pulmonary micro-nodules  At the ED the pt was afebrile and hypertensive with BP of 183/84 and HR 74. Labwork remarkable for Hgb dropping from 11.5 to 7.5, WBC 3.62. CMP remarkable for hypokalemia of 3.0, Creatinine 4.9. ALP elevated at 439. One unit of PRBC ordered GI services consulted and pt was admitted to hospital medicine for further management of GI bleed.      Overview/Hospital Course:  Pt admitted to hospital medicine for lower GI bleed. At the ED Hgb was 7.5 from 11.5, one unit of PRBC was given. Subsequent Hgb was stable at 8.3. GI consulted and colonoscopy was performed which showed no evidence of active bleeding, brown stool in the  colon. Ulcer with flat pigmented spot in the cecum with prior clips, another placed and another ulcer in the descending with prior clips, two placed. Clips were placed given stigmata of bleeding but nothing active.     Interval History: BP elevated, hydralazine given. Colonoscopy done, no sings of active bleeding. Nifedipine restarted.     Review of Systems  Objective:     Vital Signs (Most Recent):  Temp: 98.9 °F (37.2 °C) (04/16/24 1115)  Pulse: 70 (04/16/24 1115)  Resp: 18 (04/16/24 1115)  BP: (!) 227/99 (04/16/24 1115)  SpO2: 99 % (04/16/24 1115) Vital Signs (24h Range):  Temp:  [97.5 °F (36.4 °C)-98.9 °F (37.2 °C)] 98.9 °F (37.2 °C)  Pulse:  [60-94] 70  Resp:  [12-20] 18  SpO2:  [97 %-100 %] 99 %  BP: (150-227)/() 227/99     Weight: 93.5 kg (206 lb 2.1 oz)  Body mass index is 26.83 kg/m².    Intake/Output Summary (Last 24 hours) at 4/16/2024 1323  Last data filed at 4/16/2024 1000  Gross per 24 hour   Intake 600 ml   Output --   Net 600 ml         Physical Exam  Constitutional:       General: He is not in acute distress.  HENT:      Head: Normocephalic and atraumatic.   Eyes:      Extraocular Movements: Extraocular movements intact.      Pupils: Pupils are equal, round, and reactive to light.   Cardiovascular:      Rate and Rhythm: Normal rate and regular rhythm.      Heart sounds: No murmur heard.  Pulmonary:      Effort: Pulmonary effort is normal. No respiratory distress.      Breath sounds: No wheezing or rales.   Abdominal:      General: Abdomen is flat. Bowel sounds are normal. There is no distension.      Palpations: Abdomen is soft.      Tenderness: There is no abdominal tenderness.   Musculoskeletal:         General: No swelling or tenderness. Normal range of motion.      Cervical back: Normal range of motion.      Right lower leg: No edema.      Left lower leg: No edema.   Lymphadenopathy:      Cervical: No cervical adenopathy.   Skin:     General: Skin is warm and dry.      Coloration: Skin is  not jaundiced.      Findings: No bruising.      Comments: Right upper arm AV fistula   Neurological:      General: No focal deficit present.      Mental Status: He is alert and oriented to person, place, and time.             Significant Labs: All pertinent labs within the past 24 hours have been reviewed.    Significant Imaging: I have reviewed all pertinent imaging results/findings within the past 24 hours.    Assessment/Plan:      * GI bleed  Pt presenting to the ED complaining of multiple bloody bowel movement. Bloody bowel movement started on 04/12/24.  One week ago he had polypectomy on colonoscopy.  He is continued to have 3-4 maroon, thick bowel movements every day. Hgb dropped from 11.5 to 7.5. Pt reports feeling weak and dizzy since Sunday. Also reports diarrhea for which he took imodium and pepto bismol. Last colonoscopy done on 04/08/24 showed 5 polyps of which 4 were removed.     Plan  - One unit of PRBC ordered  - GI consulted  - Colonoscopy showing no evidence of active bleeding, brown stool in the colon. Ulcer with flat pigmented spot in the cecum with prior clips, another placed and another ulcer in the descending with prior clips, two placed. Clips were placed given stigmata of bleeding but nothing active.   - Advancing diet  - NPO at midnight for possible colonoscopy tomorrow   - Golytely prep to start at 6pm, to be completed within 4 hours if possible  - Monitor Hgb q4hrs  - Transfuse to keep Hgb >7, plts >50  - Hold anticoagulants if safe to do so per primary team  - If becomes hemodynamically unstable with associated large volume hematochezia, recommend STAT CTA and IR embolization if positive        Hypertensive urgency  Patient has a current diagnosis of hypertensive urgency (without evidence of end organ damage) which is uncontrolled.  Latest blood pressure and vitals reviewed-   Temp:  [97.5 °F (36.4 °C)-98.9 °F (37.2 °C)]   Pulse:  [60-94]   Resp:  [12-20]   BP: (150-227)/()   SpO2:   [97 %-100 %] .   Patient currently off IV antihypertensives.   Home meds for hypertension were reviewed and noted below.   Hypertension Medications               hydrALAZINE (APRESOLINE) 100 MG tablet Take 1 tablet (100 mg total) by mouth 3 (three) times daily.    NIFEdipine (PROCARDIA XL) 90 MG (OSM) 24 hr tablet Take 1 tablet (90 mg total) by mouth once daily.            Medication adjustment for hospital antihypertensives is as follows- see Hypertension    Will aim for controlled BP reduction by medications noted above. Monitor and mitigate end organ damage as indicated.    Hypokalemia  RESOLVED  Patient has hypokalemia which is Acute and currently uncontrolled. Most recent potassium levels reviewed-   Lab Results   Component Value Date    K 3.5 04/16/2024   . Will continue potassium replacement per protocol and recheck repeat levels after replacement completed.     Acute blood loss anemia  Patient's anemia is currently uncontrolled. Has received 1 units of PRBCs on 04/15/24 . Etiology likely d/t acute blood loss which was from GI bleed  Current CBC reviewed-   Lab Results   Component Value Date    HGB 7.1 (L) 04/16/2024    HCT 21.2 (L) 04/16/2024     Monitor serial CBC and transfuse if patient becomes hemodynamically unstable, symptomatic or H/H drops below 7/21.  Please see GI bleed     ESRD on hemodialysis  Pt with ESRD on hemodialysis TThS. Creatine elevated at baeline for now. BMP reviewed- noted Estimated Creatinine Clearance: 18.4 mL/min (A) (based on SCr of 4.9 mg/dL (H)). according to latest data. Based on current GFR, CKD stage is stage 5 - GFR < 15.      Nephrology consulted to arrange inpatient hemodialysis  Monitor UOP, serial BMP and adjust therapy as needed. Renally dose meds. Avoid nephrotoxic medications and procedures.    Type II diabetes mellitus with neurological manifestations  Patient's FSGs are controlled on current medication regimen.  Last A1c reviewed-   Lab Results   Component Value  "Date    HGBA1C 5.1 02/22/2023     Most recent fingerstick glucose reviewed- No results for input(s): "POCTGLUCOSE" in the last 24 hours.  Current correctional scale  Low  Decrease anti-hyperglycemic dose as follows-   Antihyperglycemics (From admission, onward)      None          Hold Oral hypoglycemics while patient is in the hospital.  Ordering Imipramine for peripheral neuropathy    HTN (hypertension)  Chronic, uncontrolled. Latest blood pressure and vitals reviewed-     Temp:  [97.5 °F (36.4 °C)-98.9 °F (37.2 °C)]   Pulse:  [60-94]   Resp:  [12-20]   BP: (150-227)/()   SpO2:  [97 %-100 %] .   Home meds for hypertension were reviewed and noted below.   Hypertension Medications               hydrALAZINE (APRESOLINE) 100 MG tablet Take 1 tablet (100 mg total) by mouth 3 (three) times daily.    NIFEdipine (PROCARDIA XL) 90 MG (OSM) 24 hr tablet Take 1 tablet (90 mg total) by mouth once daily.            While in the hospital, will manage blood pressure as follows; Will hold HTN meds in the setting of bleeding. Restarting nifedipine.     Will utilize p.r.n. blood pressure medication only if patient's blood pressure greater than 180/110 and he develops symptoms such as worsening chest pain or shortness of breath.      VTE Risk Mitigation (From admission, onward)           Ordered     Reason for No Pharmacological VTE Prophylaxis  Once        Question:  Reasons:  Answer:  Risk of Bleeding    04/15/24 1532     IP VTE HIGH RISK PATIENT  Once         04/15/24 1532     Place sequential compression device  Until discontinued         04/15/24 1532                    Discharge Planning   WILMER: 4/17/2024     Code Status: Full Code   Is the patient medically ready for discharge?:     Reason for patient still in hospital (select all that apply): Patient trending condition and Treatment  Discharge Plan A: Home                  Camron Louis MD  Department of Hospital Medicine   [facility]    "

## 2024-04-16 NOTE — CARE UPDATE
"RAPID RESPONSE NURSE CHART REVIEW        Chart Reviewed: 04/16/2024, 2:31 AM    MRN: [id number]  Bed: 606/606 A    Dx: GI bleed    Catalino Mcfadden has a past medical history of Cancer, Diabetes mellitus, type 2, Diabetic foot ulcer associated with diabetes mellitus due to underlying condition, ESRD on hemodialysis, Hyperlipidemia, Hypertension, and Obese.    Last VS: BP (!) 180/90 (BP Location: Right arm)   Pulse 80   Temp 97.5 °F (36.4 °C) (Axillary)   Resp 20   Ht 6' 1.5" (1.867 m)   Wt 93.5 kg (206 lb 2.1 oz)   SpO2 98%   BMI 26.83 kg/m²     24H Vital Sign Range:  Temp:  [97.5 °F (36.4 °C)-98.7 °F (37.1 °C)]   Pulse:  [67-94]   Resp:  [13-20]   BP: (131-215)/()   SpO2:  [97 %-100 %]     Level of Consciousness (AVPU): alert    Recent Labs     04/15/24  1409 04/15/24  2119 04/16/24  0004   WBC 3.62* 3.99 5.28   HGB 7.3* 7.5* 8.1*   HCT 22.4* 23.2* 24.3*    149* 168       Recent Labs     04/15/24  1029 04/15/24  1613   * 128*   K 3.0* 3.3*   CL 94* 96   CO2 27 24   BUN 32* 32*   CREATININE 4.9* 5.1*   GLU 87 81   MG  --  1.8        No results for input(s): "PH", "PCO2", "PO2", "HCO3", "POCSATURATED", "BE" in the last 72 hours.     OXYGEN:             MEWS score: 1    Rounding completed with charge JUAN J Drummond reports new orders for Hydralazine , SBP now 180. No additional concerns verbalized at this time. Instructed to call [phone] for further concerns or assistance.    Emmie Rosario RN        "

## 2024-04-16 NOTE — PROGRESS NOTES
Report received from JUAN J Angeles. Pt arrived from floor AAOx4 via bed per pt request. Dialysis started via NIKUNJ clement. [email or web address].

## 2024-04-16 NOTE — NURSING
Nurses Note -- 4 Eyes      4/15/2024   9:57 PM      Skin assessed during: Admit      [x] No Altered Skin Integrity Present    []Prevention Measures Documented      [] Yes- Altered Skin Integrity Present or Discovered   [] LDA Added if Not in Epic (Describe Wound)   [] New Altered Skin Integrity was Present on Admit and Documented in LDA   [] Wound Image Taken    Wound Care Consulted? No    Attending Nurse:  Carly Farrell RN/Staff Member:  Dylan

## 2024-04-16 NOTE — TREATMENT PLAN
GI Treatment Plan Note:    Impression:              - Preparation of the colon was fair.   - A single (solitary) ulcer in the cecum. Clip was placed. Clip : [place].   - A single (solitary) ulcer in the descending colon. Clips were placed. Clip : [place].   - No specimens collected.     Recommendation:          - [facility] patient to hospital [facility] for [facility] care.   - Resume regular diet.   - Continue present medications.   - Repeat colonoscopy at appointment to be  scheduled per protocol.     We will sign off at this time.    Kely Joshua DO  Gastroenterology PGY-4

## 2024-04-16 NOTE — NURSING TRANSFER
Nursing Transfer Note      4/16/2024   10:35 AM    Pt transported to 606 on stretcher c remote cardiac telemetry monitor and dentures at bedside.  VSS, denies pain, n/v, sob.  Report given to receiving floor rn

## 2024-04-16 NOTE — PLAN OF CARE
[facility] - Med Surg  Initial Discharge Assessment       Primary Care Provider: No, Primary Doctor    Admission Diagnosis: Hematochezia [K92.1]  Dizziness [R42]  Chest pain [R07.9]    Admission Date: 4/15/2024  Expected Discharge Date: 4/17/2024    Transition of Care Barriers: None    Payor: BLUE CROSS BLUE SHIELD / Plan: BLUE CONNECT / Product Type: HMO /     Extended Emergency Contact Information  Primary Emergency Contact: Víctor Mcfadden  Mobile Phone: [phone]  Relation: Sister   needed? No    Discharge Plan A: Home  Discharge Plan B: Home, Home with family      [id number] - [place] - [address] [facility] [address] [place] [address] [address]  [facility] [address]  Phone: [phone] Fax: [phone]    [facility] [address]  [address]   [facility] [address]  Phone: [phone] Fax: [phone]    [facility] #[id number] - [address] [facility] & [address]  [place] [address]  Phone: [phone] Fax: [phone]      Initial Assessment (most recent)       Adult Discharge Assessment - 04/16/24 1317          Discharge Assessment    Assessment Type Discharge Planning Assessment     Confirmed/corrected address, phone number and insurance Yes     Confirmed Demographics --   Address updated on file.    Source of Information patient     When was your last doctors appointment? --   Pt will need a new PCP.    Communicated WILMER with patient/caregiver Yes     Reason For Admission G IBleed     People in Home alone     Facility Arrived From: Heme/Onc Clinic     Do you expect to return to your current living situation? Yes     Do you have help at home or someone to help you manage your care at home? No     Prior to hospitilization cognitive status: Alert/Oriented     Current cognitive status: Alert/Oriented     Walking or Climbing Stairs Difficulty yes     Walking or Climbing Stairs  ambulation difficulty, requires equipment     Mobility Management Pt uses a cane to ambulate.     Dressing/Bathing Difficulty no     Equipment Currently Used at Home cane, straight     Readmission within 30 days? No     Patient currently being followed by outpatient case management? No     Do you currently have service(s) that help you manage your care at home? No     Do you take prescription medications? Yes     Do you have prescription coverage? Yes     Coverage Blue Cross Blue Shield     Do you have any problems affording any of your prescribed medications? No     Is the patient taking medications as prescribed? yes     Who is going to help you get home at discharge? Pt has transportation home.     How do you get to doctors appointments? car, drives self     Are you on dialysis? Yes     Dialysis Name and Scheduled days [id number]@ 6:30a.m.     Do you take coumadin? No     Discharge Plan A Home     Discharge Plan B Home;Home with family     DME Needed Upon Discharge  none     Discharge Plan discussed with: Patient     Transition of Care Barriers None        Physical Activity    On average, how many days per week do you engage in moderate to strenuous exercise (like a brisk walk)? 0 days     On average, how many minutes do you engage in exercise at this level? 0 min        Financial Resource Strain    How hard is it for you to pay for the very basics like food, housing, medical care, and heating? Somewhat hard        Housing Stability    In the last 12 months, was there a time when you were not able to pay the mortgage or rent on time? No     At any time in the past 12 months, were you homeless or living in a shelter (including now)? No        Transportation Needs    In the past 12 months, has lack of transportation kept you from medical appointments or from getting medications? No     In the past 12 months, has lack of transportation kept you from meetings, work, or from getting things needed for daily  living? No        Food Insecurity    Within the past 12 months, you worried that your food would run out before you got the money to buy more. Never true     Within the past 12 months, the food you bought just didn't last and you didn't have money to get more. Never true        Stress    Do you feel stress - tense, restless, nervous, or anxious, or unable to sleep at night because your mind is troubled all the time - these days? To some extent        Social Connections    In a typical week, how many times do you talk on the phone with family, friends, or neighbors? Three times a week     How often do you attend Congregation or Evangelical services? More than 4 times per year     Do you belong to any clubs or organizations such as Congregation groups, unions, fraternal or athletic groups, or school groups? No     How often do you attend meetings of the clubs or organizations you belong to? Never     Are you , , , , never , or living with a partner?         Alcohol Use    Q1: How often do you have a drink containing alcohol? --   Denied alcohol or drug use.    Q2: How many drinks containing alcohol do you have on a typical day when you are drinking? --   Denied alcohol or drug use.                     SW completed initial discharge assessment with pt.  Updated address on file.  Pt will need a new PCP.  Pt has his own transportation home.      Pt lives alone.  Pt ambulates with a cane.  Pt independent with ADLs.  DME:  Cane.  Pt has limited support at home.  No coumadin or HH services.  [facility]    Disp:  1. Home.  2. Home w/ family    Discharge Plan A and Plan B have been determined by review of patient's clinical status, future medical and therapeutic needs, and coverage/benefits for post-acute care in coordination with multidisciplinary team members.    Larissa Stover LMSW  Part-Time-  [facility]  Ext.  [phone]

## 2024-04-16 NOTE — PLAN OF CARE
Pt had colonoscopy performed this morning followed by dialysis, has not returned to unit at this time

## 2024-04-16 NOTE — ASSESSMENT & PLAN NOTE
59 y.o. Black or  Male ESRD-HD M-W-F presents to ED on 4/15/2024 with diagnosis of: Hematochezia [K92.1];Dizziness [R42];Chest pain [R07.9]   Nephrology consulted for inpatient ESRD-HD management    Outpatient HD Information:  -Dialysis modality: Hemodialysis  -Outpatient HD unit: [facility]   -Nephrologist: Dr. Hobbs   -HD TX days: Tuesday/Thursday/Saturday, duration of treatment: 4 hr  -Dialysis access: dialysis catheter   -Residual urine: Anuric   -EDW: 109 kg    Assessment:   - Will provide dialysis today (04/16/2024) with UF - 1L as BP tolerates for metabolic clearance and volume management   - Labs reviewed and dialysate to be adjusted to current labs.   - Continue to monitor intake and output  - Please avoid gadolinium, fleets, phos-based laxatives, NSAIDs  - Dialysis thrice weekly unless more urgent indications arise. Will evaluate RRT requirements Daily.    Anemia of ESRD   Recent Labs   Lab 04/16/24  0004 04/16/24  0233 04/16/24  1202   WBC 5.28 5.72 3.31*   HGB 8.1* 8.3* 7.1*   HCT 24.3* 24.9* 21.2*    174 131*     Lab Results   Component Value Date    FESATURATED 49 04/15/2024    FERRITIN 1,143 (H) 04/15/2024       - Goal in ESRD is Hgb of 10-11. Hgb 7.1.  - EPO with HD    Mineral Bone Disease in ESRD   Lab Results   Component Value Date    .0 (H) 02/23/2022    CALCIUM 9.0 04/16/2024    ALBUMIN 2.6 (L) 04/16/2024    PHOS 3.3 04/16/2024       - F/U PO4, Mg, Calcium. And albumin levels daily.   - Renal diet with protein intake goal 1.5 g/kg/d with 1 L fluid restriction   - Restart home phos binder, phos 3.3

## 2024-04-16 NOTE — TRANSFER OF CARE
"Anesthesia Transfer of Care Note    Patient: Catalino Mcfadden    Procedure(s) Performed: Procedure(s) (LRB):  COLONOSCOPY (N/A)    Patient location: PACU    Transport from OR: Transported from OR on room air with adequate spontaneous ventilation    Post pain: adequate analgesia    Post assessment: no apparent anesthetic complications and tolerated procedure well    Post vital signs: stable    Level of consciousness: responds to stimulation    Nausea/Vomiting: no nausea/vomiting    Complications: none    Transfer of care protocol was followed    Last vitals: Visit Vitals  BP (!) 160/74 (BP Location: Right arm, Patient Position: Lying)   Pulse 60   Temp 36.7 °C (98 °F) (Temporal)   Resp 12   Ht 6' 1.5" (1.867 m)   Wt 93.5 kg (206 lb 2.1 oz)   SpO2 100%   BMI 26.83 kg/m²     "

## 2024-04-16 NOTE — SUBJECTIVE & OBJECTIVE
Interval History: BP elevated, hydralazine given. Colonoscopy done, no sings of active bleeding. Nifedipine restarted.     Review of Systems  Objective:     Vital Signs (Most Recent):  Temp: 98.9 °F (37.2 °C) (04/16/24 1115)  Pulse: 70 (04/16/24 1115)  Resp: 18 (04/16/24 1115)  BP: (!) 227/99 (04/16/24 1115)  SpO2: 99 % (04/16/24 1115) Vital Signs (24h Range):  Temp:  [97.5 °F (36.4 °C)-98.9 °F (37.2 °C)] 98.9 °F (37.2 °C)  Pulse:  [60-94] 70  Resp:  [12-20] 18  SpO2:  [97 %-100 %] 99 %  BP: (150-227)/() 227/99     Weight: 93.5 kg (206 lb 2.1 oz)  Body mass index is 26.83 kg/m².    Intake/Output Summary (Last 24 hours) at 4/16/2024 1323  Last data filed at 4/16/2024 1000  Gross per 24 hour   Intake 600 ml   Output --   Net 600 ml         Physical Exam  Constitutional:       General: He is not in acute distress.  HENT:      Head: Normocephalic and atraumatic.   Eyes:      Extraocular Movements: Extraocular movements intact.      Pupils: Pupils are equal, round, and reactive to light.   Cardiovascular:      Rate and Rhythm: Normal rate and regular rhythm.      Heart sounds: No murmur heard.  Pulmonary:      Effort: Pulmonary effort is normal. No respiratory distress.      Breath sounds: No wheezing or rales.   Abdominal:      General: Abdomen is flat. Bowel sounds are normal. There is no distension.      Palpations: Abdomen is soft.      Tenderness: There is no abdominal tenderness.   Musculoskeletal:         General: No swelling or tenderness. Normal range of motion.      Cervical back: Normal range of motion.      Right lower leg: No edema.      Left lower leg: No edema.   Lymphadenopathy:      Cervical: No cervical adenopathy.   Skin:     General: Skin is warm and dry.      Coloration: Skin is not jaundiced.      Findings: No bruising.      Comments: Right upper arm AV fistula   Neurological:      General: No focal deficit present.      Mental Status: He is alert and oriented to person, place, and time.              Significant Labs: All pertinent labs within the past 24 hours have been reviewed.    Significant Imaging: I have reviewed all pertinent imaging results/findings within the past 24 hours.

## 2024-04-16 NOTE — CONSULTS
[facility] Surg  Gastroenterology  Consult Note    Patient Name: Catalino Mcfadden  MRN: [id number]  Admission Date: 4/15/2024  Hospital Length of Stay: 1 days  Code Status: Full Code   Attending Provider: Tonie Lorenz MD   Consulting Provider: Jung Hwang MD  Primary Care Physician: Lizbeth, Primary Doctor  Principal Problem:GI bleed    Inpatient consult to Gastroenterology  Consult performed by: Jung Hwang MD  Consult ordered by: Nikhil Chong MD        Subjective:     HPI:  Catalino Mcfadden is a 59 year old man wth history of T2DM, ESRD on HD, HTN, and diffuse hypermetabolic adenopathy, who presents with hematochezia. Patient reports that on Friday 4/12, he had multiple episodes of loose, bloody bowel movements that were bright red in color. He denies having had any abdominal pain with or prior to the bowel movements. He has been having 3-4 maroon colored bowel movements every day since 4/12. He recently underwent colonoscopy on 4/8 which identified 5 polyps, with 4 of them removed. On presentation, Hgb 7.5 down from 11.5. Otherwise on admission, pt's vital signs stable. He denies any chest pain, SOB, abdominal pain, nausea or vomiting.    Prior endoscopies:  4/8/24 Colonoscopy: One 30 mm polyp in the cecum, removed piecemeal using a hot snare. Resected and retrieved. Clips were placed. Injected. One 15 mm polyp in the cecum. Resection not attempted due to location near the ileocecal valve. One 3 mm polyp in the ascending colon, removed with a jumbo cold forceps. Resected and retrieved. One 30 mm polyp in the descending colon, removed with a hot snare. Resected and retrieved. Injected. Clips were placed. Clip : "[employer]". One 3 mm polyp in the descending colon, removed with a jumbo cold forceps. Resected and retrieved. The examination was otherwise normal.    Past Medical History:   Diagnosis Date    Cancer     Diabetes mellitus, type 2     Diabetic foot ulcer associated with diabetes mellitus  due to underlying condition 07/16/2020    ESRD on hemodialysis     Hyperlipidemia     Hypertension     Obese        Past Surgical History:   Procedure Laterality Date    AV FISTULA PLACEMENT Left 07/06/2023    Procedure: CREATION, AV FISTULA;  Surgeon: Daniel Poon MD;  Location: [facility] OR;  Service: Vascular;  Laterality: Left;  RN PREOP 6/28/2023  LABS DAY OF SURGERY    BONE BIOPSY Left 07/17/2020    Procedure: BIOPSY, BONE;  Surgeon: Verona Whitmore DPM;  Location: [facility] OR;  Service: Podiatry;  Laterality: Left;    CERVICAL DISC SURGERY  07/11/2016    COLONOSCOPY N/A 3/25/2024    Procedure: COLONOSCOPY;  Surgeon: Roopa Pérez MD;  Location: [facility] (4TH FLR);  Service: Endoscopy;  Laterality: N/A;  Ref By: Dr. Pérez   constipation prep  Diaylsis Patient Lab has been ordered  3/20-precall complete-MS    COLONOSCOPY N/A 4/8/2024    Procedure: COLONOSCOPY;  Surgeon: Roopa Pérez MD;  Location: [facility] (2ND FLR);  Service: Endoscopy;  Laterality: N/A;  Prep instructions sent via portal/given to pt in clinic  pt had to be r/s at  request  Dialysis Tues,Thurs,Sat-dw  Peg Prep-dw  4/2/24- Labs - dialysis /poor prep on 3/26 - PEG x 2 - extended prep /LVM for precall - ERW  4/5-LVM for precall-KPvt    DEBRIDEMENT Left 07/17/2020    Procedure: DEBRIDEMENT;  Surgeon: Verona Whitmoer DPM;  Location: [facility] OR;  Service: Podiatry;  Laterality: Left;    DEBRIDEMENT OF FOOT Right     toes & plantar surface    ENDOBRONCHIAL ULTRASOUND N/A 3/1/2024    Procedure: ENDOBRONCHIAL ULTRASOUND (EBUS);  Surgeon: Francisco Fleming MD;  Location: [facility] OR 2ND FLR;  Service: Pulmonary;  Laterality: N/A;    ESOPHAGEAL MANOMETRY WITH MEASUREMENT OF IMPEDANCE N/A 03/27/2023    Procedure: MANOMETRY, ESOPHAGUS, WITH IMPEDANCE MEASUREMENT;  Surgeon: Roopa Pérez MD;  Location: [facility] ENDO (4TH FLR);  Service: Endoscopy;  Laterality: N/A;  Belching and rumination protocol please  instructions via portal -      ESOPHAGOGASTRODUODENOSCOPY N/A 12/16/2022    Procedure: EGD (ESOPHAGOGASTRODUODENOSCOPY);  Surgeon: Eren Francois MD;  Location: [facility] ENDO;  Service: Endoscopy;  Laterality: N/A;  Pt. on Dialysis. K+ ordered prior to procedure.EC    ESOPHAGOGASTRODUODENOSCOPY N/A 12/5/2023    Procedure: EGD (ESOPHAGOGASTRODUODENOSCOPY);  Surgeon: Kash Johnston MD;  Location: [facility] ENDO;  Service: Endoscopy;  Laterality: N/A;    FRACTURE SURGERY Right     medial ankle, metal plate present    INJECTION OF ANESTHETIC AGENT AROUND NERVE Right 2/2/2024    Procedure: BLOCK, NERVE STELLATE GANGLION *HOLD ASPIRIN 5 DAYS PRIOR*;  Surgeon: Gokul Gonzalez MD;  Location: [facility] PAIN MGT;  Service: Pain Management;  Laterality: Right;  [phone]    INSERTION OF TUNNELED CENTRAL VENOUS CATHETER (CVC) WITH SUBCUTANEOUS PORT N/A 06/11/2021    Procedure: TUNNEL CATH INSERTION WITHOUT PORT;  Surgeon: [facility];  Location: [facility] OR;  Service: Radiology;  Laterality: N/A;  11AM START---PHONE PREOP 6/10/21---COVID POSTIVE ON 7/2020---NO S/S    left leg orthopedic surgery Left     REVISION OF ARTERIOVENOUS FISTULA Left 3/14/2024    Procedure: REVISION, AV FISTULA;  Surgeon: Daniel Poon MD;  Location: [facility] OR;  Service: Vascular;  Laterality: Left;  RN preop 3/6/2024----NEED ORDERS    UPPER GASTROINTESTINAL ENDOSCOPY         Review of patient's allergies indicates:  No Known Allergies  Family History       Problem Relation (Age of Onset)    Heart disease Father          Tobacco Use    Smoking status: Never    Smokeless tobacco: Never   Substance and Sexual Activity    Alcohol use: Not Currently     Comment: occ rare beer    Drug use: No    Sexual activity: Not Currently     Review of Systems   Constitutional:  Negative for chills and fever.   HENT:  Negative for congestion and sore throat.    Eyes:  Negative for photophobia and visual disturbance.   Respiratory:  Negative for cough and shortness of breath.     Cardiovascular:  Negative for chest pain and palpitations.   Gastrointestinal:  Positive for blood in stool. Negative for abdominal pain.   Genitourinary:  Negative for dysuria and hematuria.   Musculoskeletal:  Negative for arthralgias and back pain.   Skin:  Negative for rash and wound.   Neurological:  Negative for dizziness and syncope.   Psychiatric/Behavioral:  Negative for agitation and behavioral problems.      Objective:     Vital Signs (Most Recent):  Temp: 98.1 °F (36.7 °C) (04/16/24 0716)  Pulse: 70 (04/16/24 0716)  Resp: 16 (04/16/24 0716)  BP: (!) 172/84 (04/16/24 0716)  SpO2: 98 % (04/16/24 0716) Vital Signs (24h Range):  Temp:  [97.5 °F (36.4 °C)-98.7 °F (37.1 °C)] 98.1 °F (36.7 °C)  Pulse:  [67-94] 70  Resp:  [13-20] 16  SpO2:  [97 %-100 %] 98 %  BP: (131-215)/() 172/84     Weight: 93.5 kg (206 lb 2.1 oz) (04/15/24 2136)  Body mass index is 26.83 kg/m².      Intake/Output Summary (Last 24 hours) at 4/16/2024 0854  Last data filed at 4/15/2024 1923  Gross per 24 hour   Intake 500 ml   Output --   Net 500 ml       Lines/Drains/Airways       Central Venous Catheter Line  Duration                  Hemodialysis Catheter right subclavian -- days              Peripheral Intravenous Line  Duration                  Hemodialysis AV Fistula Left upper arm -- days         Peripheral IV - Single Lumen 04/15/24 1411 20 G Anterior;Distal;Right Upper Arm <1 day                     Physical Exam  Vitals reviewed.   Constitutional:       General: He is not in acute distress.     Appearance: Normal appearance.   HENT:      Head: Normocephalic and atraumatic.   Cardiovascular:      Rate and Rhythm: Normal rate and regular rhythm.   Pulmonary:      Effort: Pulmonary effort is normal. No respiratory distress.   Abdominal:      Palpations: Abdomen is soft.      Tenderness: There is no abdominal tenderness.   Musculoskeletal:         General: No swelling or tenderness.      Cervical back: Full passive range of  motion without pain and normal range of motion.   Skin:     General: Skin is warm and dry.   Neurological:      General: No focal deficit present.      Mental Status: He is alert and oriented to person, place, and time.   Psychiatric:         Mood and Affect: Mood normal.         Behavior: Behavior normal.          Significant Labs:  All pertinent lab results from the last 24 hours have been reviewed.    Significant Imaging:  Imaging results within the past 24 hours have been reviewed.  Assessment/Plan:     GI  * GI bleed  59M w/hx of T2DM, ESRD, recent colonoscopy with polypectomy who p/w active hematochezia. Patient had colonoscopy on 4/8 and on 4/12 developed multiple episodes of bloody bowel movements that have been persistent and daily. He had 4 polyps resected on colonoscopy, with two 30mm polyps resected via hot snare. No prior hx of GI bleeding. Now with four point drop in Hgb. Suspect cause of current hematochezia is due to post-polypectomy bleeding.    Recommendations:  - Plan for Colonoscopy on 4/16, patient completed bowel prep on 4/15.  - Trend CBCs  - Maintain adequate IV access, volume resuscitation as needed  - Transfuse pRBC if Hb < 7 g/dL  - Correct coagulopathy (goal plt >50, INR <1.5) if present in patients without absolute contraindications.  - Avoid NSAIDs, antiplatelet agents and anticoagulants, unless absolutely necessary        Thank you for your consult. I will follow-up with patient. Please contact us if you have any additional questions.    Jung Hwang MD  Gastroenterology  [facility] Surg

## 2024-04-16 NOTE — NURSING
BP parameters and patient status communicated to MD . Taking care of patient rendering to needs at bedside together with JUAN J Richardson . Tab Hydralazine 100 mg administered as per order but BP stayed at high ends . Patient had blood in bowel movements MD made aware CBC levels being monitored and discussed with MD . 1 pint RBC ordered and started transfusion but discontinued per MD order . BT ran for approximately 30 minutes at the rate of 75 ml / hr . Patient is awake , alert and oriented without acute distress . No order for additional BP medication as of now , will continue to monitor .

## 2024-04-16 NOTE — SUBJECTIVE & OBJECTIVE
Past Medical History:   Diagnosis Date    Cancer     Diabetes mellitus, type 2     Diabetic foot ulcer associated with diabetes mellitus due to underlying condition 07/16/2020    ESRD on hemodialysis     Hyperlipidemia     Hypertension     Obese        Past Surgical History:   Procedure Laterality Date    AV FISTULA PLACEMENT Left 07/06/2023    Procedure: CREATION, AV FISTULA;  Surgeon: Daniel Poon MD;  Location: [facility] OR;  Service: Vascular;  Laterality: Left;  RN PREOP 6/28/2023  LABS DAY OF SURGERY    BONE BIOPSY Left 07/17/2020    Procedure: BIOPSY, BONE;  Surgeon: Verona Whitmore DPM;  Location: [facility] OR;  Service: Podiatry;  Laterality: Left;    CERVICAL DISC SURGERY  07/11/2016    COLONOSCOPY N/A 3/25/2024    Procedure: COLONOSCOPY;  Surgeon: Roopa Pérez MD;  Location: [facility] (4TH FLR);  Service: Endoscopy;  Laterality: N/A;  Ref By: Dr. Pérez   constipation prep  Diaylsis Patient Lab has been ordered  3/20-precall complete-MS    COLONOSCOPY N/A 4/8/2024    Procedure: COLONOSCOPY;  Surgeon: Roopa Pérez MD;  Location: [facility] (2ND FLR);  Service: Endoscopy;  Laterality: N/A;  Prep instructions sent via portal/given to pt in clinic  pt had to be r/s at  request  Dialysis Tues,Thurs,Sat-dw  Peg Prep-dw  4/2/24- Labs - dialysis /poor prep on 3/26 - PEG x 2 - extended prep /LVM for precall - ERW  4/5-LVM for precall-KPvt    DEBRIDEMENT Left 07/17/2020    Procedure: DEBRIDEMENT;  Surgeon: Verona Whitmore DPM;  Location: [facility] OR;  Service: Podiatry;  Laterality: Left;    DEBRIDEMENT OF FOOT Right     toes & plantar surface    ENDOBRONCHIAL ULTRASOUND N/A 3/1/2024    Procedure: ENDOBRONCHIAL ULTRASOUND (EBUS);  Surgeon: Francisco Fleming MD;  Location: [facility] 2ND FLR;  Service: Pulmonary;  Laterality: N/A;    ESOPHAGEAL MANOMETRY WITH MEASUREMENT OF IMPEDANCE N/A 03/27/2023    Procedure: MANOMETRY, ESOPHAGUS, WITH IMPEDANCE MEASUREMENT;  Surgeon: Roopa Pérez MD;   Location: [facility] ENDO (4TH FLR);  Service: Endoscopy;  Laterality: N/A;  Belching and rumination protocol please  instructions via portal - sm    ESOPHAGOGASTRODUODENOSCOPY N/A 12/16/2022    Procedure: EGD (ESOPHAGOGASTRODUODENOSCOPY);  Surgeon: Eren Francois MD;  Location: [facility] ENDO;  Service: Endoscopy;  Laterality: N/A;  Pt. on Dialysis. K+ ordered prior to procedure.EC    ESOPHAGOGASTRODUODENOSCOPY N/A 12/5/2023    Procedure: EGD (ESOPHAGOGASTRODUODENOSCOPY);  Surgeon: Kash Johnston MD;  Location: [facility] ENDO;  Service: Endoscopy;  Laterality: N/A;    FRACTURE SURGERY Right     medial ankle, metal plate present    INJECTION OF ANESTHETIC AGENT AROUND NERVE Right 2/2/2024    Procedure: BLOCK, NERVE STELLATE GANGLION *HOLD ASPIRIN 5 DAYS PRIOR*;  Surgeon: Gokul Gonzalez MD;  Location: [facility] PAIN MGT;  Service: Pain Management;  Laterality: Right;  [phone]    INSERTION OF TUNNELED CENTRAL VENOUS CATHETER (CVC) WITH SUBCUTANEOUS PORT N/A 06/11/2021    Procedure: TUNNEL CATH INSERTION WITHOUT PORT;  Surgeon: [facility] Diagnostic Provider;  Location: [facility] OR;  Service: Radiology;  Laterality: N/A;  11AM START---PHONE PREOP 6/10/21---COVID POSTIVE ON 7/2020---NO S/S    left leg orthopedic surgery Left     REVISION OF ARTERIOVENOUS FISTULA Left 3/14/2024    Procedure: REVISION, AV FISTULA;  Surgeon: Daniel Poon MD;  Location: [facility] OR;  Service: Vascular;  Laterality: Left;  RN preop 3/6/2024----NEED ORDERS    UPPER GASTROINTESTINAL ENDOSCOPY         Review of patient's allergies indicates:  No Known Allergies  Current Facility-Administered Medications   Medication Dose Route Frequency Provider Last Rate Last Admin    0.9%  NaCl infusion (for blood administration)   Intravenous Q24H PRN Dirk Reed MD        dextrose 10% bolus 125 mL 125 mL  12.5 g Intravenous PRN Camron Gomez MD        dextrose 10% bolus 250 mL 250 mL  25 g Intravenous PRN Camron Gomez MD         glucagon (human recombinant) injection 1 mg  1 mg Intramuscular PRN Camron Gomez MD        glucose chewable tablet 16 g  16 g Oral PRN Camron Gomez MD        glucose chewable tablet 24 g  24 g Oral PRN Camron Gomez MD        hydrALAZINE injection 10 mg  10 mg Intravenous Q6H PRN Dirk Reed MD   10 mg at 04/16/24 0512    imipramine tablet 25 mg  25 mg Oral QHS Camron Gomez MD        naloxone 0.4 mg/mL injection 0.02 mg  0.02 mg Intravenous PRN Camron Gomez MD        NIFEdipine 24 hr tablet 90 mg  90 mg Oral Daily Camron Gomez MD   90 mg at 04/16/24 1125    prochlorperazine tablet 5 mg  5 mg Oral TID PRN Dirk Reed MD   5 mg at 04/16/24 0322    sodium chloride 0.9% flush 10 mL  10 mL Intravenous Q12H PRN Camron Gomez MD         Family History       Problem Relation (Age of Onset)    Heart disease Father          Tobacco Use    Smoking status: Never    Smokeless tobacco: Never   Substance and Sexual Activity    Alcohol use: Not Currently     Comment: occ rare beer    Drug use: No    Sexual activity: Not Currently     Review of Systems   Constitutional:  Negative for chills and fever.   HENT:  Negative for congestion and sore throat.    Eyes:  Negative for visual disturbance.   Respiratory:  Negative for cough and shortness of breath.    Cardiovascular:  Negative for chest pain and palpitations.   Gastrointestinal:  Positive for blood in stool. Negative for abdominal pain.   Genitourinary:  Negative for dysuria and hematuria.   Musculoskeletal:  Negative for arthralgias and back pain.   Skin:  Negative for rash and wound.   Neurological:  Negative for dizziness and syncope.   Psychiatric/Behavioral:  Negative for agitation and behavioral problems.      Objective:     Vital Signs (Most Recent):  Temp: 98.9 °F (37.2 °C) (04/16/24 1115)  Pulse: 70 (04/16/24 1115)  Resp: 18 (04/16/24  1115)  BP: (!) 227/99 (04/16/24 1115)  SpO2: 99 % (04/16/24 1115) Vital Signs (24h Range):  Temp:  [97.5 °F (36.4 °C)-98.9 °F (37.2 °C)] 98.9 °F (37.2 °C)  Pulse:  [60-94] 70  Resp:  [12-20] 18  SpO2:  [97 %-100 %] 99 %  BP: (150-227)/() 227/99     Weight: 93.5 kg (206 lb 2.1 oz) (04/15/24 2136)  Body mass index is 26.83 kg/m².  Body surface area is 2.2 meters squared.    I/O last 3 completed shifts:  In: 500 [Blood:500]  Out: -      Physical Exam  Vitals reviewed.   Constitutional:       General: He is not in acute distress.     Appearance: Normal appearance.   HENT:      Head: Normocephalic and atraumatic.   Cardiovascular:      Rate and Rhythm: Normal rate and regular rhythm.   Pulmonary:      Effort: Pulmonary effort is normal. No respiratory distress.   Abdominal:      Palpations: Abdomen is soft.      Tenderness: There is no abdominal tenderness.   Musculoskeletal:         General: No swelling or tenderness.      Cervical back: Full passive range of motion without pain and normal range of motion.   Skin:     General: Skin is warm and dry.   Neurological:      General: No focal deficit present.      Mental Status: He is alert and oriented to person, place, and time.   Psychiatric:         Mood and Affect: Mood normal.         Behavior: Behavior normal.          Significant Labs:  CBC:   Recent Labs   Lab 04/16/24  0233   WBC 5.72   RBC 2.71*   HGB 8.3*   HCT 24.9*      MCV 92   MCH 30.6   MCHC 33.3     CMP:   Recent Labs   Lab 04/16/24  0233   GLU 90   CALCIUM 9.0   ALBUMIN 2.6*   PROT 6.2   *   K 3.5   CO2 22*   CL 95   BUN 34*   CREATININE 5.7*   ALKPHOS 474*   ALT 28   AST 44*   BILITOT 1.2*     All labs within the past 24 hours have been reviewed.

## 2024-04-16 NOTE — ASSESSMENT & PLAN NOTE
Pt presenting to the ED complaining of multiple bloody bowel movement. Bloody bowel movement started on 04/12/24.  One week ago he had polypectomy on colonoscopy.  He is continued to have 3-4 maroon, thick bowel movements every day. Hgb dropped from 11.5 to 7.5. Pt reports feeling weak and dizzy since Sunday. Also reports diarrhea for which he took imodium and pepto bismol. Last colonoscopy done on 04/08/24 showed 5 polyps of which 4 were removed.     Plan  - One unit of PRBC ordered  - GI consulted  - Colonoscopy showing no evidence of active bleeding, brown stool in the colon. Ulcer with flat pigmented spot in the cecum with prior clips, another placed and another ulcer in the descending with prior clips, two placed. Clips were placed given stigmata of bleeding but nothing active.   - Advancing diet  - NPO at midnight for possible colonoscopy tomorrow   - Golytely prep to start at 6pm, to be completed within 4 hours if possible  - Monitor Hgb q4hrs  - Transfuse to keep Hgb >7, plts >50  - Hold anticoagulants if safe to do so per primary team  - If becomes hemodynamically unstable with associated large volume hematochezia, recommend STAT CTA and IR embolization if positive

## 2024-04-16 NOTE — ASSESSMENT & PLAN NOTE
Chronic, uncontrolled. Latest blood pressure and vitals reviewed-     Temp:  [97.5 °F (36.4 °C)-98.9 °F (37.2 °C)]   Pulse:  [60-94]   Resp:  [12-20]   BP: (150-227)/()   SpO2:  [97 %-100 %] .   Home meds for hypertension were reviewed and noted below.   Hypertension Medications               hydrALAZINE (APRESOLINE) 100 MG tablet Take 1 tablet (100 mg total) by mouth 3 (three) times daily.    NIFEdipine (PROCARDIA XL) 90 MG (OSM) 24 hr tablet Take 1 tablet (90 mg total) by mouth once daily.            While in the hospital, will manage blood pressure as follows; Will hold HTN meds in the setting of bleeding. Restarting nifedipine.     Will utilize p.r.n. blood pressure medication only if patient's blood pressure greater than 180/110 and he develops symptoms such as worsening chest pain or shortness of breath.

## 2024-04-16 NOTE — ASSESSMENT & PLAN NOTE
Patient has a current diagnosis of hypertensive urgency (without evidence of end organ damage) which is uncontrolled.  Latest blood pressure and vitals reviewed-   Temp:  [97.5 °F (36.4 °C)-98.9 °F (37.2 °C)]   Pulse:  [60-94]   Resp:  [12-20]   BP: (150-227)/()   SpO2:  [97 %-100 %] .   Patient currently off IV antihypertensives.   Home meds for hypertension were reviewed and noted below.   Hypertension Medications               hydrALAZINE (APRESOLINE) 100 MG tablet Take 1 tablet (100 mg total) by mouth 3 (three) times daily.    NIFEdipine (PROCARDIA XL) 90 MG (OSM) 24 hr tablet Take 1 tablet (90 mg total) by mouth once daily.            Medication adjustment for hospital antihypertensives is as follows- see Hypertension    Will aim for controlled BP reduction by medications noted above. Monitor and mitigate end organ damage as indicated.

## 2024-04-17 ENCOUNTER — E-CONSULT (OUTPATIENT)
Dept: INFECTIOUS DISEASES | Facility: CLINIC | Age: 59
End: 2024-04-17
Payer: COMMERCIAL

## 2024-04-17 DIAGNOSIS — R93.89 ABNORMAL FINDING ON IMAGING: Primary | ICD-10-CM

## 2024-04-17 LAB
BASOPHILS # BLD AUTO: 0.04 K/UL (ref 0–0.2)
BASOPHILS NFR BLD: 0.9 % (ref 0–1.9)
BLD PROD TYP BPU: NORMAL
BLOOD UNIT EXPIRATION DATE: NORMAL
BLOOD UNIT TYPE CODE: 5100
BLOOD UNIT TYPE: NORMAL
CODING SYSTEM: NORMAL
CROSSMATCH INTERPRETATION: NORMAL
DIFFERENTIAL METHOD BLD: ABNORMAL
DISPENSE STATUS: NORMAL
EOSINOPHIL # BLD AUTO: 0.1 K/UL (ref 0–0.5)
EOSINOPHIL NFR BLD: 2.4 % (ref 0–8)
ERYTHROCYTE [DISTWIDTH] IN BLOOD BY AUTOMATED COUNT: 14.8 % (ref 11.5–14.5)
HCT VFR BLD AUTO: 20.5 % (ref 40–54)
HGB BLD-MCNC: 7 G/DL (ref 14–18)
IMM GRANULOCYTES # BLD AUTO: 0.14 K/UL (ref 0–0.04)
IMM GRANULOCYTES NFR BLD AUTO: 3 % (ref 0–0.5)
LYMPHOCYTES # BLD AUTO: 0.5 K/UL (ref 1–4.8)
LYMPHOCYTES NFR BLD: 11.3 % (ref 18–48)
MCH RBC QN AUTO: 29.4 PG (ref 27–31)
MCHC RBC AUTO-ENTMCNC: 34.1 G/DL (ref 32–36)
MCV RBC AUTO: 86 FL (ref 82–98)
MONOCYTES # BLD AUTO: 0.4 K/UL (ref 0.3–1)
MONOCYTES NFR BLD: 9.1 % (ref 4–15)
NEUTROPHILS # BLD AUTO: 3.4 K/UL (ref 1.8–7.7)
NEUTROPHILS NFR BLD: 73.3 % (ref 38–73)
NRBC BLD-RTO: 0 /100 WBC
PLATELET # BLD AUTO: 141 K/UL (ref 150–450)
PMV BLD AUTO: 11.3 FL (ref 9.2–12.9)
RBC # BLD AUTO: 2.38 M/UL (ref 4.6–6.2)
TRANS ERYTHROCYTES VOL PATIENT: NORMAL ML
WBC # BLD AUTO: 4.6 K/UL (ref 3.9–12.7)

## 2024-04-17 PROCEDURE — 86920 COMPATIBILITY TEST SPIN: CPT

## 2024-04-17 PROCEDURE — P9021 RED BLOOD CELLS UNIT: HCPCS

## 2024-04-17 PROCEDURE — 36415 COLL VENOUS BLD VENIPUNCTURE: CPT | Performed by: INTERNAL MEDICINE

## 2024-04-17 PROCEDURE — 99451 NTRPROF PH1/NTRNET/EHR 5/>: CPT | Mod: S$GLB,,, | Performed by: INTERNAL MEDICINE

## 2024-04-17 PROCEDURE — 25000003 PHARM REV CODE 250

## 2024-04-17 PROCEDURE — 21400001 HC TELEMETRY ROOM

## 2024-04-17 PROCEDURE — 85025 COMPLETE CBC W/AUTO DIFF WBC: CPT | Performed by: INTERNAL MEDICINE

## 2024-04-17 RX ORDER — HYDRALAZINE HYDROCHLORIDE 50 MG/1
100 TABLET, FILM COATED ORAL EVERY 8 HOURS
Status: DISCONTINUED | OUTPATIENT
Start: 2024-04-17 | End: 2024-04-18 | Stop reason: HOSPADM

## 2024-04-17 RX ORDER — SODIUM CHLORIDE 9 MG/ML
INJECTION, SOLUTION INTRAVENOUS ONCE
Status: COMPLETED | OUTPATIENT
Start: 2024-04-18 | End: 2024-04-18

## 2024-04-17 RX ORDER — HYDROCODONE BITARTRATE AND ACETAMINOPHEN 500; 5 MG/1; MG/1
TABLET ORAL
Status: DISCONTINUED | OUTPATIENT
Start: 2024-04-17 | End: 2024-04-18 | Stop reason: HOSPADM

## 2024-04-17 RX ADMIN — HYDRALAZINE HYDROCHLORIDE 100 MG: 50 TABLET ORAL at 01:04

## 2024-04-17 RX ADMIN — NIFEDIPINE 90 MG: 60 TABLET, FILM COATED, EXTENDED RELEASE ORAL at 08:04

## 2024-04-17 RX ADMIN — HYDRALAZINE HYDROCHLORIDE 100 MG: 50 TABLET ORAL at 09:04

## 2024-04-17 NOTE — PLAN OF CARE
Problem: Adult Inpatient Plan of Care  Goal: Plan of Care Review  Outcome: Ongoing, Progressing  Goal: Patient-Specific Goal (Individualized)  Outcome: Ongoing, Not Progressing  Goal: Absence of Hospital-Acquired Illness or Injury  Outcome: Ongoing, Progressing  Goal: Optimal Comfort and Wellbeing  Outcome: Ongoing, Progressing  Goal: Readiness for Transition of Care  Outcome: Adequate for Care Transition     Problem: Diabetes Comorbidity  Goal: Blood Glucose Level Within Targeted Range  Outcome: Ongoing, Progressing     Problem: Infection  Goal: Absence of Infection Signs and Symptoms  Outcome: Ongoing, Progressing   Pt is A,A,O x 4 . Stable V/S. No C/O pain or discomfort during there day. Pt turned in bed independently. No falls or injuries per day. Pt is receiving 1 unit of blood now and will be discharge after completing the transfusion as MD ordered.

## 2024-04-17 NOTE — CONSULTS
[facility] - Infectious Disease  Response for E-Consult     Patient Name: Catalino Mcfadden  MRN: [id number]  Primary Care Provider: Lizbeth, Primary Doctor   Requesting Provider: Brianna Mendoza MD  Consults  59 who presented w/ unintentional weight loss; PET in Jan 2024 w/ widespread lesions - nodes, liver, spleen, bone. s/p left iliac bone lesion bx - negative for cancer, it doesnt appear cultures were sent. additoinal tests to rule out infectious causes?   Recommendation:  Gold quantiferin was neg -02/23/22    Will need an inpatient ID consult for formal evaluation -  Will send fungitell  Fungal immunodiffusion assay assay  EBV , HIV assay   Will discuss with primary team     Contingency:     Total time of Consultation: 5 minute    I did not speak to the requesting provider verbally about this.     *This eConsult is based on the clinical data available to me and is furnished without benefit of a physical examination. The eConsult will need to be interpreted in light of any clinical issues or changes in patient status not available to me at the time of filing this eConsults. Significant changes in patient condition or level of acuity should result in immediate formal consultation and reevaluation. Please alert me if you have further questions.    Thank you for this eConsult referral.     Vikas Cope MD, [facility] - Infectious Disease

## 2024-04-17 NOTE — ASSESSMENT & PLAN NOTE
Patient's anemia is currently uncontrolled. Has received 1 units of PRBCs on 04/15/24 . Etiology likely d/t acute blood loss which was from GI bleed  Current CBC reviewed-   Lab Results   Component Value Date    HGB 7.0 (L) 04/17/2024    HCT 20.5 (L) 04/17/2024     Monitor serial CBC and transfuse if patient becomes hemodynamically unstable, symptomatic or H/H drops below 7/21.  Please see GI bleed

## 2024-04-17 NOTE — PROGRESS NOTES
[facility] Medicine  Progress Note    Patient Name: Catalino Mcfadden  MRN: [id number]  Patient Class: IP- Inpatient   Admission Date: 4/15/2024  Length of Stay: 2 days  Attending Physician: Tonie Lorenz MD  Primary Care Provider: Lizbeth, Primary Doctor        Subjective:     Principal Problem:GI bleed        HPI:  Mr. Mcfadden is a 58 yo M with ESRD on HD TThS, HTN, T2DM with neuropathy and gastroparesis who presented to the ED after being sent from Heme/onc clinic due to bloody bowel movement. Symptoms started last Friday 04/12/24 when he started having bloody diarrhea and started feeling lightheaded and weak. He treat the symptoms with imodium and pepto bismol. Pt also reports loss of appetite for the past year, and loss of 100lbs.  Denies headaches, visual changes, SOB, cough, chest pain, palpitation, nausea, vomiting, abdominal pain, constipation, urinary frequency or any weakness. Per chart review, the pt had a colonoscopy on 04/08/24, five polyps were identified of which 4 were removed and sent for pathology. Pt is being work up for possible metastatic cancer of unknown primary source. PET scan done in Jan 24 showed widespread hypermetabolic adenopathy, innumerable foci within the liver, splenic lesions, bone lesions, uptake in the transverse colon, and diffuse pulmonary micro-nodules  At the ED the pt was afebrile and hypertensive with BP of 183/84 and HR 74. Labwork remarkable for Hgb dropping from 11.5 to 7.5, WBC 3.62. CMP remarkable for hypokalemia of 3.0, Creatinine 4.9. ALP elevated at 439. One unit of PRBC ordered GI services consulted and pt was admitted to hospital medicine for further management of GI bleed.      Overview/Hospital Course:  Pt admitted to hospital medicine for lower GI bleed. At the ED Hgb was 7.5 from 11.5, one unit of PRBC was given. Subsequent Hgb was stable at 8.3. GI consulted and colonoscopy was performed which showed no evidence of active bleeding, brown stool in the  colon. Ulcer with flat pigmented spot in the cecum with prior clips, another placed and another ulcer in the descending with prior clips, two placed. Clips were placed given stigmata of bleeding but nothing active. Pt received hemodialysis. Hgb 7.0 from 8.3, receiving one unit of blood. To follow up outpatient with ID.     Interval History: NAOE, BP elevated, restarted hydralazine. Hgb low, 1 unit of blood ordered.     Review of Systems  Objective:     Vital Signs (Most Recent):  Temp: 96.1 °F (35.6 °C) (04/17/24 1456)  Pulse: 76 (04/17/24 1456)  Resp: 16 (04/17/24 1456)  BP: (!) 177/84 (04/17/24 1456)  SpO2: 100 % (04/17/24 1456) Vital Signs (24h Range):  Temp:  [96.1 °F (35.6 °C)-98.8 °F (37.1 °C)] 96.1 °F (35.6 °C)  Pulse:  [71-83] 76  Resp:  [16-19] 16  SpO2:  [97 %-100 %] 100 %  BP: (147-215)/() 177/84     Weight: 93.5 kg (206 lb 2.1 oz)  Body mass index is 26.83 kg/m².    Intake/Output Summary (Last 24 hours) at 4/17/2024 1506  Last data filed at 4/17/2024 0900  Gross per 24 hour   Intake 820 ml   Output 2850 ml   Net -2030 ml         Physical Exam  Constitutional:       General: He is not in acute distress.  HENT:      Head: Normocephalic and atraumatic.   Eyes:      Extraocular Movements: Extraocular movements intact.      Pupils: Pupils are equal, round, and reactive to light.   Cardiovascular:      Rate and Rhythm: Normal rate and regular rhythm.      Heart sounds: No murmur heard.  Pulmonary:      Effort: Pulmonary effort is normal. No respiratory distress.      Breath sounds: No wheezing or rales.   Abdominal:      General: Abdomen is flat. Bowel sounds are normal. There is no distension.      Palpations: Abdomen is soft.      Tenderness: There is no abdominal tenderness.   Musculoskeletal:         General: No swelling or tenderness. Normal range of motion.      Cervical back: Normal range of motion.      Right lower leg: No edema.      Left lower leg: No edema.   Lymphadenopathy:      Cervical: No  cervical adenopathy.   Skin:     General: Skin is warm and dry.      Coloration: Skin is not jaundiced.      Findings: No bruising.      Comments: Right upper arm AV fistula   Neurological:      General: No focal deficit present.      Mental Status: He is alert and oriented to person, place, and time.             Significant Labs: All pertinent labs within the past 24 hours have been reviewed.    Significant Imaging: I have reviewed all pertinent imaging results/findings within the past 24 hours.    Assessment/Plan:      * GI bleed  Pt presenting to the ED complaining of multiple bloody bowel movement. Bloody bowel movement started on 04/12/24.  One week ago he had polypectomy on colonoscopy.  He is continued to have 3-4 maroon, thick bowel movements every day. Hgb dropped from 11.5 to 7.5. Pt reports feeling weak and dizzy since Sunday. Also reports diarrhea for which he took imodium and pepto bismol. Last colonoscopy done on 04/08/24 showed 5 polyps of which 4 were removed.     Plan  - One unit of PRBC ordered  - GI consulted  - Colonoscopy showing no evidence of active bleeding, brown stool in the colon. Ulcer with flat pigmented spot in the cecum with prior clips, another placed and another ulcer in the descending with prior clips, two placed. Clips were placed given stigmata of bleeding but nothing active.   - Advancing diet  - NPO at midnight for possible colonoscopy tomorrow   - Golytely prep to start at 6pm, to be completed within 4 hours if possible  - Monitor Hgb q4hrs  - Transfuse to keep Hgb >7, plts >50  - Hold anticoagulants if safe to do so per primary team  - If becomes hemodynamically unstable with associated large volume hematochezia, recommend STAT CTA and IR embolization if positive        Hypertensive urgency  Patient has a current diagnosis of hypertensive urgency (without evidence of end organ damage) which is uncontrolled.  Latest blood pressure and vitals reviewed-   Temp:  [97.5 °F (36.4  °C)-98.9 °F (37.2 °C)]   Pulse:  [60-94]   Resp:  [12-20]   BP: (150-227)/()   SpO2:  [97 %-100 %] .   Patient currently off IV antihypertensives.   Home meds for hypertension were reviewed and noted below.   Hypertension Medications               hydrALAZINE (APRESOLINE) 100 MG tablet Take 1 tablet (100 mg total) by mouth 3 (three) times daily.    NIFEdipine (PROCARDIA XL) 90 MG (OSM) 24 hr tablet Take 1 tablet (90 mg total) by mouth once daily.            Medication adjustment for hospital antihypertensives is as follows- see Hypertension    Will aim for controlled BP reduction by medications noted above. Monitor and mitigate end organ damage as indicated.    Hypokalemia  RESOLVED  Patient has hypokalemia which is Acute and currently uncontrolled. Most recent potassium levels reviewed-   Lab Results   Component Value Date    K 3.5 04/16/2024   . Will continue potassium replacement per protocol and recheck repeat levels after replacement completed.     Acute blood loss anemia  Patient's anemia is currently uncontrolled. Has received 1 units of PRBCs on 04/15/24 . Etiology likely d/t acute blood loss which was from GI bleed  Current CBC reviewed-   Lab Results   Component Value Date    HGB 7.0 (L) 04/17/2024    HCT 20.5 (L) 04/17/2024     Monitor serial CBC and transfuse if patient becomes hemodynamically unstable, symptomatic or H/H drops below 7/21.  Please see GI bleed     ESRD on hemodialysis  Pt with ESRD on hemodialysis TThS. Creatine elevated at baeline for now. BMP reviewed- noted Estimated Creatinine Clearance: 18.4 mL/min (A) (based on SCr of 4.9 mg/dL (H)). according to latest data. Based on current GFR, CKD stage is stage 5 - GFR < 15.      Nephrology consulted to arrange inpatient hemodialysis  Monitor UOP, serial BMP and adjust therapy as needed. Renally dose meds. Avoid nephrotoxic medications and procedures.    Type II diabetes mellitus with neurological manifestations  Patient's FSGs are  "controlled on current medication regimen.  Last A1c reviewed-   Lab Results   Component Value Date    HGBA1C 5.1 02/22/2023     Most recent fingerstick glucose reviewed- No results for input(s): "POCTGLUCOSE" in the last 24 hours.  Current correctional scale  Low  Decrease anti-hyperglycemic dose as follows-   Antihyperglycemics (From admission, onward)      None          Hold Oral hypoglycemics while patient is in the hospital.  Ordering Imipramine for peripheral neuropathy    HTN (hypertension)  Chronic, uncontrolled. Latest blood pressure and vitals reviewed-     Temp:  [96.1 °F (35.6 °C)-98.8 °F (37.1 °C)]   Pulse:  [71-83]   Resp:  [16-19]   BP: (147-215)/()   SpO2:  [97 %-100 %] .   Home meds for hypertension were reviewed and noted below.   Hypertension Medications               hydrALAZINE (APRESOLINE) 100 MG tablet Take 1 tablet (100 mg total) by mouth 3 (three) times daily.    NIFEdipine (PROCARDIA XL) 90 MG (OSM) 24 hr tablet Take 1 tablet (90 mg total) by mouth once daily.            While in the hospital, will manage blood pressure as follows; Will hold HTN meds in the setting of bleeding. Restarting nifedipine, restarted hydralazine     Will utilize p.r.n. blood pressure medication only if patient's blood pressure greater than 180/110 and he develops symptoms such as worsening chest pain or shortness of breath.      VTE Risk Mitigation (From admission, onward)           Ordered     heparin (porcine) injection 1,000 Units  As needed (PRN)         04/16/24 1557     Reason for No Pharmacological VTE Prophylaxis  Once        Question:  Reasons:  Answer:  Risk of Bleeding    04/15/24 1532     IP VTE HIGH RISK PATIENT  Once         04/15/24 1532     Place sequential compression device  Until discontinued         04/15/24 1532                    Discharge Planning   WILMER: 4/17/2024     Code Status: Full Code   Is the patient medically ready for discharge?:     Reason for patient still in hospital (select " all that apply): Treatment  Discharge Plan A: Home   Discharge Delays: None known at this time              Camron Louis MD  Department of Hospital Medicine   [facility]

## 2024-04-17 NOTE — PLAN OF CARE
TRESSA in communication with pt.  No needs identified at this time.  Pt has transportation home.      Discharge Plan A and Plan B have been determined by review of patient's clinical status, future medical and therapeutic needs, and coverage/benefits for post-acute care in coordination with multidisciplinary team members.     04/17/24 1502   Post-Acute Status   Post-Acute Authorization Other   Coverage Blue [employer] Blue [employer]   Other Status No Post-Acute Service Needs   Discharge Delays None known at this time   Discharge Plan   Discharge Plan A Home   Discharge Plan B Home     Larissa Stover LMSW  Part-Time-  [facility]  Ext. [phone]

## 2024-04-17 NOTE — SUBJECTIVE & OBJECTIVE
Interval History: NAOE, BP elevated, restarted hydralazine. Hgb low, 1 unit of blood ordered.     Review of Systems  Objective:     Vital Signs (Most Recent):  Temp: 96.1 °F (35.6 °C) (04/17/24 1456)  Pulse: 76 (04/17/24 1456)  Resp: 16 (04/17/24 1456)  BP: (!) 177/84 (04/17/24 1456)  SpO2: 100 % (04/17/24 1456) Vital Signs (24h Range):  Temp:  [96.1 °F (35.6 °C)-98.8 °F (37.1 °C)] 96.1 °F (35.6 °C)  Pulse:  [71-83] 76  Resp:  [16-19] 16  SpO2:  [97 %-100 %] 100 %  BP: (147-215)/() 177/84     Weight: 93.5 kg (206 lb 2.1 oz)  Body mass index is 26.83 kg/m².    Intake/Output Summary (Last 24 hours) at 4/17/2024 1506  Last data filed at 4/17/2024 0900  Gross per 24 hour   Intake 820 ml   Output 2850 ml   Net -2030 ml         Physical Exam  Constitutional:       General: He is not in acute distress.  HENT:      Head: Normocephalic and atraumatic.   Eyes:      Extraocular Movements: Extraocular movements intact.      Pupils: Pupils are equal, round, and reactive to light.   Cardiovascular:      Rate and Rhythm: Normal rate and regular rhythm.      Heart sounds: No murmur heard.  Pulmonary:      Effort: Pulmonary effort is normal. No respiratory distress.      Breath sounds: No wheezing or rales.   Abdominal:      General: Abdomen is flat. Bowel sounds are normal. There is no distension.      Palpations: Abdomen is soft.      Tenderness: There is no abdominal tenderness.   Musculoskeletal:         General: No swelling or tenderness. Normal range of motion.      Cervical back: Normal range of motion.      Right lower leg: No edema.      Left lower leg: No edema.   Lymphadenopathy:      Cervical: No cervical adenopathy.   Skin:     General: Skin is warm and dry.      Coloration: Skin is not jaundiced.      Findings: No bruising.      Comments: Right upper arm AV fistula   Neurological:      General: No focal deficit present.      Mental Status: He is alert and oriented to person, place, and time.              Significant Labs: All pertinent labs within the past 24 hours have been reviewed.    Significant Imaging: I have reviewed all pertinent imaging results/findings within the past 24 hours.

## 2024-04-17 NOTE — NURSING
Pt refused blood draw (CBC) due at 12mn and wants to be drawn at 4am, pt claimed he understood its importance with regards to his current condition (GI bleed), medical oncall informed.    4:30am, pt still refused blood draws, Pt educated again about its importance but still don't want to do it as of this time. medical oncall informed.

## 2024-04-17 NOTE — ASSESSMENT & PLAN NOTE
Chronic, uncontrolled. Latest blood pressure and vitals reviewed-     Temp:  [96.1 °F (35.6 °C)-98.8 °F (37.1 °C)]   Pulse:  [71-83]   Resp:  [16-19]   BP: (147-215)/()   SpO2:  [97 %-100 %] .   Home meds for hypertension were reviewed and noted below.   Hypertension Medications               hydrALAZINE (APRESOLINE) 100 MG tablet Take 1 tablet (100 mg total) by mouth 3 (three) times daily.    NIFEdipine (PROCARDIA XL) 90 MG (OSM) 24 hr tablet Take 1 tablet (90 mg total) by mouth once daily.            While in the hospital, will manage blood pressure as follows; Will hold HTN meds in the setting of bleeding. Restarting nifedipine, restarted hydralazine     Will utilize p.r.n. blood pressure medication only if patient's blood pressure greater than 180/110 and he develops symptoms such as worsening chest pain or shortness of breath.

## 2024-04-17 NOTE — PLAN OF CARE
Problem: Adult Inpatient Plan of Care  Goal: Plan of Care Review  Outcome: Ongoing, Progressing  Goal: Patient-Specific Goal (Individualized)  Outcome: Ongoing, Progressing  Goal: Absence of Hospital-Acquired Illness or Injury  Outcome: Ongoing, Progressing  Goal: Optimal Comfort and Wellbeing  Outcome: Ongoing, Progressing  Goal: Readiness for Transition of Care  Outcome: Ongoing, Progressing     Problem: Diabetes Comorbidity  Goal: Blood Glucose Level Within Targeted Range  Outcome: Ongoing, Progressing     Problem: Infection  Goal: Absence of Infection Signs and Symptoms  Outcome: Ongoing, Progressing     Problem: Device-Related Complication Risk (Hemodialysis)  Goal: Safe, Effective Therapy Delivery  Outcome: Ongoing, Progressing     Problem: Hemodynamic Instability (Hemodialysis)  Goal: Effective Tissue Perfusion  Outcome: Ongoing, Progressing     Problem: Infection (Hemodialysis)  Goal: Absence of Infection Signs and Symptoms  Outcome: Ongoing, Progressing

## 2024-04-17 NOTE — PLAN OF CARE
APPOINTMENT:    Specialty Appointment: Infectious disease    Provider requires schedule review by Office Nurse - Office to call patient with date and time.    APPOINTMENT:    Patient Appointment(s) scheduled with  Chauncey Perez MD  Thursday Apr 25, 2024 9:20 AM

## 2024-04-18 ENCOUNTER — TELEPHONE (OUTPATIENT)
Dept: INFECTIOUS DISEASES | Facility: CLINIC | Age: 59
End: 2024-04-18

## 2024-04-18 VITALS
HEART RATE: 72 BPM | OXYGEN SATURATION: 97 % | DIASTOLIC BLOOD PRESSURE: 92 MMHG | WEIGHT: 206.13 LBS | RESPIRATION RATE: 18 BRPM | SYSTOLIC BLOOD PRESSURE: 194 MMHG | TEMPERATURE: 99 F | BODY MASS INDEX: 26.45 KG/M2 | HEIGHT: 74 IN

## 2024-04-18 LAB
ALBUMIN SERPL BCP-MCNC: 2.1 G/DL (ref 3.5–5.2)
ANION GAP SERPL CALC-SCNC: 10 MMOL/L (ref 8–16)
BASOPHILS # BLD AUTO: 0.03 K/UL (ref 0–0.2)
BASOPHILS NFR BLD: 0.7 % (ref 0–1.9)
BUN SERPL-MCNC: 29 MG/DL (ref 6–20)
CALCIUM SERPL-MCNC: 8.7 MG/DL (ref 8.7–10.5)
CHLORIDE SERPL-SCNC: 99 MMOL/L (ref 95–110)
CO2 SERPL-SCNC: 22 MMOL/L (ref 23–29)
CREAT SERPL-MCNC: 5.5 MG/DL (ref 0.5–1.4)
DIFFERENTIAL METHOD BLD: ABNORMAL
EOSINOPHIL # BLD AUTO: 0.1 K/UL (ref 0–0.5)
EOSINOPHIL NFR BLD: 2.2 % (ref 0–8)
ERYTHROCYTE [DISTWIDTH] IN BLOOD BY AUTOMATED COUNT: 14.9 % (ref 11.5–14.5)
EST. GFR  (NO RACE VARIABLE): 11.1 ML/MIN/1.73 M^2
GLUCOSE SERPL-MCNC: 120 MG/DL (ref 70–110)
HCT VFR BLD AUTO: 24.1 % (ref 40–54)
HGB BLD-MCNC: 8.2 G/DL (ref 14–18)
IMM GRANULOCYTES # BLD AUTO: 0.01 K/UL (ref 0–0.04)
IMM GRANULOCYTES NFR BLD AUTO: 0.2 % (ref 0–0.5)
LYMPHOCYTES # BLD AUTO: 0.6 K/UL (ref 1–4.8)
LYMPHOCYTES NFR BLD: 13.1 % (ref 18–48)
MCH RBC QN AUTO: 30.9 PG (ref 27–31)
MCHC RBC AUTO-ENTMCNC: 34 G/DL (ref 32–36)
MCV RBC AUTO: 91 FL (ref 82–98)
MONOCYTES # BLD AUTO: 0.4 K/UL (ref 0.3–1)
MONOCYTES NFR BLD: 9.3 % (ref 4–15)
NEUTROPHILS # BLD AUTO: 3.4 K/UL (ref 1.8–7.7)
NEUTROPHILS NFR BLD: 74.5 % (ref 38–73)
NRBC BLD-RTO: 0 /100 WBC
PHOSPHATE SERPL-MCNC: 3.4 MG/DL (ref 2.7–4.5)
PLATELET # BLD AUTO: 146 K/UL (ref 150–450)
PMV BLD AUTO: 10.6 FL (ref 9.2–12.9)
POTASSIUM SERPL-SCNC: 4.4 MMOL/L (ref 3.5–5.1)
RBC # BLD AUTO: 2.65 M/UL (ref 4.6–6.2)
SODIUM SERPL-SCNC: 131 MMOL/L (ref 136–145)
WBC # BLD AUTO: 4.5 K/UL (ref 3.9–12.7)

## 2024-04-18 PROCEDURE — 63600175 PHARM REV CODE 636 W HCPCS: Performed by: NURSE PRACTITIONER

## 2024-04-18 PROCEDURE — 25000003 PHARM REV CODE 250

## 2024-04-18 PROCEDURE — 80100016 HC MAINTENANCE HEMODIALYSIS

## 2024-04-18 PROCEDURE — 90935 HEMODIALYSIS ONE EVALUATION: CPT | Mod: ,,, | Performed by: NURSE PRACTITIONER

## 2024-04-18 PROCEDURE — 25000003 PHARM REV CODE 250: Performed by: NURSE PRACTITIONER

## 2024-04-18 PROCEDURE — 80069 RENAL FUNCTION PANEL: CPT | Performed by: STUDENT IN AN ORGANIZED HEALTH CARE EDUCATION/TRAINING PROGRAM

## 2024-04-18 PROCEDURE — 85025 COMPLETE CBC W/AUTO DIFF WBC: CPT | Performed by: STUDENT IN AN ORGANIZED HEALTH CARE EDUCATION/TRAINING PROGRAM

## 2024-04-18 RX ADMIN — HEPARIN SODIUM 1000 UNITS: 1000 INJECTION, SOLUTION INTRAVENOUS; SUBCUTANEOUS at 12:04

## 2024-04-18 RX ADMIN — NIFEDIPINE 90 MG: 60 TABLET, FILM COATED, EXTENDED RELEASE ORAL at 02:04

## 2024-04-18 RX ADMIN — SODIUM CHLORIDE: 9 INJECTION, SOLUTION INTRAVENOUS at 09:04

## 2024-04-18 RX ADMIN — HYDRALAZINE HYDROCHLORIDE 100 MG: 50 TABLET ORAL at 05:04

## 2024-04-18 RX ADMIN — HYDRALAZINE HYDROCHLORIDE 100 MG: 50 TABLET ORAL at 02:04

## 2024-04-18 NOTE — PROGRESS NOTES
[facility]FF HEMODIALYSIS NOTE     Patient currently on hemodialysis for removal of uremic toxins and volume.     Patient seen and evaluated on hemodialysis, tolerating treatment, see HD flowsheet for vitals and assessments.    Labs have been reviewed and the dialysate bath has been adjusted.       Assessment/Plan:    -Hyponatremia in setting of ESRD - NA bath adjusted, 1.5 fluid restrictions   -Plan for discharge today   -Patient seen on HD, tolerating treatment well, w/o complaints   -UF goal of 2L  -Renal diet, if not NPO   -Strict I/O's and daily weights  -Daily renal function panels  -Keep MAP >65 while on HD   -Hgb goal 10-11, epo with HD   -no indication for phos binders  -Will continue to follow while inpatient     Lisa Rivera DNP-FNP, C  Nephrology  Pager: [phone]

## 2024-04-18 NOTE — PROGRESS NOTES
Patient arrived in a bed  to dialysis unit.   Report received from Yulissa Yepez per dialysis Flowsheet.     Hemodialysis initiated using the following:     Dialysis Access: R Sub CVC. Tolerated well, flows good. Blood obtained for CBC and RFP.  called for      UF goal 2 L as tolerated      Will Maintain telemetry and blood pressure monitoring throughout treatment.  Refer to dialysis flowsheet and MAR for details.

## 2024-04-18 NOTE — DISCHARGE SUMMARY
[facility] Medicine  Discharge Summary      Patient Name: Catalino Mcfadden  MRN: [id number]  CASANDRA: [id number]  Patient Class: IP- Inpatient  Admission Date: 4/15/2024  Hospital Length of Stay: 3 days  Discharge Date and Time:  04/18/2024 8:40 AM  Attending Physician: Valentine Guillaume MD   Discharging Provider: Murali Fish DO  Primary Care Provider: Lizbeth Primary Doctor  [facility] Medicine Team: [address], DO  Primary Care Team: [place] [id number]    HPI:   Mr. Mcfadden is a 60 yo M with ESRD on HD TThS, HTN, T2DM with neuropathy and gastroparesis who presented to the ED after being sent from Heme/onc clinic due to bloody bowel movement. Symptoms started last Friday 04/12/24 when he started having bloody diarrhea and started feeling lightheaded and weak. He treat the symptoms with imodium and pepto bismol. Pt also reports loss of appetite for the past year, and loss of 100lbs.  Denies headaches, visual changes, SOB, cough, chest pain, palpitation, nausea, vomiting, abdominal pain, constipation, urinary frequency or any weakness. Per chart review, the pt had a colonoscopy on 04/08/24, five polyps were identified of which 4 were removed and sent for pathology. Pt is being work up for possible metastatic cancer of unknown primary source. PET scan done in Jan 24 showed widespread hypermetabolic adenopathy, innumerable foci within the liver, splenic lesions, bone lesions, uptake in the transverse colon, and diffuse pulmonary micro-nodules  At the ED the pt was afebrile and hypertensive with BP of 183/84 and HR 74. Labwork remarkable for Hgb dropping from 11.5 to 7.5, WBC 3.62. CMP remarkable for hypokalemia of 3.0, Creatinine 4.9. ALP elevated at 439. One unit of PRBC ordered GI services consulted and pt was admitted to hospital medicine for further management of GI bleed.      Procedure(s) (LRB):  COLONOSCOPY (N/A)      Hospital Course:   Pt admitted to hospital medicine for lower GI  bleed. At the ED Hgb was 7.5 from 11.5, one unit of PRBC was given. Subsequent Hgb was stable at 8.3. GI consulted and colonoscopy was performed which showed no evidence of active bleeding, brown stool in the colon. Ulcer with flat pigmented spot in the cecum with prior clips, another placed and another ulcer in the descending with prior clips, two placed. Clips were placed given stigmata of bleeding but nothing active. Pt received hemodialysis. Hgb 7.0 from 8.3, receiving one unit of blood. To follow up outpatient with ID. D/C after dialysis session.       Goals of Care Treatment Preferences:  Code Status: Full Code           Consults:   Consults (From admission, onward)          Status Ordering Provider     Inpatient consult to Nephrology  Once        Provider:  (Not yet assigned)    Completed CAN UMANZOR     Inpatient consult to Gastroenterology  Once        Provider:  (Not yet assigned)    Completed LIEN CENTENO            No new Assessment & Plan notes have been filed under this hospital service since the last note was generated.  Service: Hospital Medicine    Final Active Diagnoses:    Diagnosis Date Noted POA    PRINCIPAL PROBLEM:  GI bleed [K92.2] 04/15/2024 Yes    Hypertensive urgency [I16.0] 04/16/2024 Yes    Acute blood loss anemia [D62] 04/15/2024 Yes    Hypokalemia [E87.6] 04/15/2024 Yes    ESRD on hemodialysis [N18.6, Z99.2] 02/22/2023 Not Applicable    Type II diabetes mellitus with neurological manifestations [E11.49]  Yes    Hyponatremia [E87.1] 07/16/2020 Yes    HTN (hypertension) [I10] 08/26/2014 Yes      Problems Resolved During this Admission:       Discharged Condition: stable    Disposition: Home or Self Care    Follow Up:   Follow-up Information       No, Primary Doctor Follow up.                           Patient Instructions:      Ambulatory referral/consult to Internal Medicine   Standing Status: Future   Referral Priority: Routine Referral Type: Consultation    Referral Reason: Specialty Services Required   Requested Specialty: Internal Medicine   Number of Visits Requested: 1     Ambulatory referral/consult to Infectious Disease   Standing Status: Future   Referral Priority: Routine Referral Type: Consultation   Referral Reason: Specialty Services Required   Requested Specialty: Infectious Diseases   Number of Visits Requested: 1       Significant Diagnostic Studies: N/A    Pending Diagnostic Studies:       Procedure Component Value Units Date/Time    CBC auto differential [[id number]]     Order Status: Sent Lab Status: No result     Specimen: Blood     CBC with Automated Differential [[id number]] Collected: 04/16/24 2029    Order Status: Sent Lab Status: No result     Specimen: Blood            Medications:  Reconciled Home Medications:      Medication List        CONTINUE taking these medications      hydrALAZINE 100 MG tablet  Commonly known as: APRESOLINE  Take 1 tablet (100 mg total) by mouth 3 (three) times daily.     NIFEdipine 90 MG (OSM) 24 hr tablet  Commonly known as: PROCARDIA XL  Take 1 tablet (90 mg total) by mouth once daily.            STOP taking these medications      imipramine 25 MG tablet  Commonly known as: TOFRANIL     oxyCODONE-acetaminophen 5-325 mg per tablet  Commonly known as: PERCOCET              Indwelling Lines/Drains at time of discharge:   Lines/Drains/Airways       Central Venous Catheter Line  Duration                  Hemodialysis Catheter right subclavian -- days              Drain  Duration                  Hemodialysis AV Fistula Left upper arm -- days                    Time spent on the discharge of patient: 30 minutes         Murali Fish DO  Department of Hospital Medicine  [facility]

## 2024-04-18 NOTE — PLAN OF CARE
Problem: Adult Inpatient Plan of Care  Goal: Plan of Care Review  Outcome: Adequate for Care Transition  Goal: Absence of Hospital-Acquired Illness or Injury  Outcome: Adequate for Care Transition  Goal: Optimal Comfort and Wellbeing  Outcome: Adequate for Care Transition  Goal: Readiness for Transition of Care  Outcome: Adequate for Care Transition     Problem: Diabetes Comorbidity  Goal: Blood Glucose Level Within Targeted Range  Outcome: Adequate for Care Transition     Problem: Infection  Goal: Absence of Infection Signs and Symptoms  Outcome: Adequate for Care Transition     Problem: Device-Related Complication Risk (Hemodialysis)  Goal: Safe, Effective Therapy Delivery  Outcome: Adequate for Care Transition     Problem: Hemodynamic Instability (Hemodialysis)  Goal: Effective Tissue Perfusion  Outcome: Adequate for Care Transition     Problem: Infection (Hemodialysis)  Goal: Absence of Infection Signs and Symptoms  Outcome: Adequate for Care Transition     Problem: Electrolyte Imbalance (Chronic Kidney Disease)  Goal: Electrolyte Balance  Outcome: Adequate for Care Transition     Problem: Fluid Volume Excess (Chronic Kidney Disease)  Goal: Fluid Balance  Outcome: Adequate for Care Transition   Pt is A,A,O x 4. No C/O pain or discomfort during the day. Pt turned in bed and sat at the edge of the bed. No falls or injuries per day. Pt went to dialysis today. Pt's BP after coming back to floor from dialysis was elevated 198/83 pt received scheduled BP medications and Dr Guillaume notified. Pt's BP rechecked 194/92, pt asymptomatic, Dr Guillaume notified and she stated it is ok to discharge the pt if BP < 200/100 and he is asymptomatic and asymptomatic.

## 2024-04-18 NOTE — PROGRESS NOTES
HD tx complete  2 L removed, tolerated well  Blood returned, both lumens flushed with NS, hep locked, capped and wrapped  Pt transferred from unit via hospital bed back to room by transport

## 2024-04-18 NOTE — PLAN OF CARE
Problem: Infection (Hemodialysis)  Goal: Absence of Infection Signs and Symptoms  Outcome: Ongoing, Progressing     Problem: Electrolyte Imbalance (Chronic Kidney Disease)  Goal: Electrolyte Balance  Outcome: Ongoing, Progressing     Problem: Fluid Volume Excess (Chronic Kidney Disease)  Goal: Fluid Balance  Outcome: Ongoing, Progressing

## 2024-04-18 NOTE — NURSING
Pt is back to the floor from dialysis. Pt is A,A,O x 4 . V/S checked . BP elevated 198/83. Pt received his scheduled BP medications. Will recheck BP in 30 min.

## 2024-04-18 NOTE — NURSING
Discharge papers and instructions given and reviewed with pt and he verbalized understanding. PIV removed and pressure applied to the site. Tele box and leads remove and returned to the nursing station. Wheelchair ordered. Pt stated he will drive his car back to home.

## 2024-04-18 NOTE — TELEPHONE ENCOUNTER
Called patient to schedule an appointment with [facility].   Patient was not aware of the reason he needs to follow up the ID clinic.   Told patient one of the ID providers was consulted yesterday by the hospital team, and want him to follow up with the ID team. Patient stated he was not aware and refuse appointment.       ----- Message from Tim Anderson sent at 4/18/2024  3:50 PM CDT -----  Regarding: RE: Reschedule  Contact: pt.  [phone]  Hi   No I didn't have it in my note and I did not want to guess  ----- Message -----  From: Valencia Romero MA  Sent: 4/18/2024   3:40 PM CDT  To: Tim Anderson  Subject: RE: Sarah Almaguer,     Do you know the name and number of the case management that called requesting the appointment?  ----- Message -----  From: Tim Anderson  Sent: 4/18/2024  10:00 AM CDT  To: Gretta Moore Staff  Subject: [facility] Case Management is calling in ref to pt's 4/18 scheduled appt with provider. Pt is still in the hospital and needs appt cancelled and rescheduled. Soonest appt in the system is in May. Pt has a referral. Patient Requesting Call Back @  [phone]

## 2024-04-19 LAB
ACID FAST MOD KINY STN SPEC: NORMAL
MYCOBACTERIUM SPEC QL CULT: NORMAL

## 2024-04-19 NOTE — PLAN OF CARE
[facility] - Med Surg  Discharge Final Note    Primary Care Provider: No, Primary Doctor    Expected Discharge Date: 4/18/2024    Pt discharged home with no needs.  Pt had transportation home.      Future Appointments   Date Time Provider Department Center   4/19/2024  2:30 PM VASCULAR ULTRASOUND, [facility]   4/22/2024  9:00 AM Donnell Richards NP [facility] VAS [facility]   4/23/2024  7:15 AM Aviva Martinez DPM [facility] POD [place]   4/25/2024  9:20 AM Chauncey Perez MD [facility] IM [facility]   4/30/2024  1:45 PM BMT BIOPSY, [facility] HC BMT [place]   5/16/2024  1:00 PM Roopa Pérez MD [facility] GASTRO [facility]   5/17/2024 10:00 AM Brianna Mendoza MD [facility] HEMONC3 [place]     Discharge Plan A and Plan B have been determined by review of patient's clinical status, future medical and therapeutic needs, and coverage/benefits for post-acute care in coordination with multidisciplinary team members.      Final Discharge Note (most recent)       Final Note - 04/19/24 0808          Final Note    Assessment Type Final Discharge Note     Anticipated Discharge Disposition Home or Self Care        Post-Acute Status    Post-Acute Authorization Other     Coverage Blue Cross Blue Shield     Other Status No Post-Acute Service Needs     Discharge Delays None known at this time                     Important Message from Medicare             Contact Info       No, Primary Doctor   Relationship: PCP - General        Next Steps: Follow up

## 2024-04-22 ENCOUNTER — TELEPHONE (OUTPATIENT)
Dept: VASCULAR SURGERY | Facility: CLINIC | Age: 59
End: 2024-04-22
Payer: COMMERCIAL

## 2024-04-22 DIAGNOSIS — N18.6 ESRD ON HEMODIALYSIS: Primary | ICD-10-CM

## 2024-04-22 DIAGNOSIS — Z99.2 ESRD ON HEMODIALYSIS: Primary | ICD-10-CM

## 2024-04-23 ENCOUNTER — TELEPHONE (OUTPATIENT)
Dept: VASCULAR SURGERY | Facility: CLINIC | Age: 59
End: 2024-04-23
Payer: COMMERCIAL

## 2024-04-23 NOTE — TELEPHONE ENCOUNTER
----- Message from Albaro Yang sent at 4/23/2024  8:33 AM CDT -----  Regarding: Self .[phone]   Type: Patient Returning Call    Who Called: Self     Who Left Message for Patient: Tesha Erickson MA    Does the patient know what this is regarding?: yes     Would the patient rather a call back or a response via [facility]?  Call back     Best Call Back Number: .[phone]      Additional Information:     Thank you.

## 2024-04-24 ENCOUNTER — HOSPITAL ENCOUNTER (OUTPATIENT)
Dept: CARDIOLOGY | Facility: HOSPITAL | Age: 59
Discharge: HOME | End: 2024-04-24
Attending: SURGERY
Payer: COMMERCIAL

## 2024-04-24 DIAGNOSIS — Z99.2 ARTERIOVENOUS FISTULA FOR HEMODIALYSIS IN PLACE, PRIMARY: ICD-10-CM

## 2024-04-24 PROCEDURE — 93990 DOPPLER FLOW TESTING: CPT | Mod: TC

## 2024-04-24 PROCEDURE — 93990 DOPPLER FLOW TESTING: CPT | Mod: 26,,, | Performed by: SURGERY

## 2024-04-25 ENCOUNTER — PATIENT MESSAGE (OUTPATIENT)
Dept: INFECTIOUS DISEASES | Facility: CLINIC | Age: 59
End: 2024-04-25
Payer: COMMERCIAL

## 2024-04-25 LAB
DNA RANGE(S) EXAMINED NAR: NORMAL
GENE DIS ANL INTERP-IMP: NORMAL
GENE DIS ASSESSED: NORMAL
MSI CA SPEC-IMP: NOT DETECTED
REASON FOR STUDY: NORMAL
TEMPUS LCA: NORMAL
TEMPUS PORTAL: NORMAL

## 2024-04-26 ENCOUNTER — OFFICE VISIT (OUTPATIENT)
Dept: INFECTIOUS DISEASES | Facility: CLINIC | Age: 59
End: 2024-04-26
Payer: COMMERCIAL

## 2024-04-26 ENCOUNTER — TELEPHONE (OUTPATIENT)
Dept: VASCULAR SURGERY | Facility: CLINIC | Age: 59
End: 2024-04-26
Payer: COMMERCIAL

## 2024-04-26 ENCOUNTER — LAB VISIT (OUTPATIENT)
Dept: LAB | Facility: HOSPITAL | Age: 59
End: 2024-04-26
Attending: INTERNAL MEDICINE
Payer: COMMERCIAL

## 2024-04-26 VITALS
SYSTOLIC BLOOD PRESSURE: 138 MMHG | DIASTOLIC BLOOD PRESSURE: 67 MMHG | TEMPERATURE: 98 F | WEIGHT: 194 LBS | HEART RATE: 99 BPM | HEIGHT: 74 IN | BODY MASS INDEX: 24.9 KG/M2

## 2024-04-26 DIAGNOSIS — R63.4 WEIGHT LOSS: ICD-10-CM

## 2024-04-26 DIAGNOSIS — R91.1 SOLITARY PULMONARY NODULE: ICD-10-CM

## 2024-04-26 DIAGNOSIS — M89.9 LYTIC BONE LESIONS ON XRAY: Primary | ICD-10-CM

## 2024-04-26 DIAGNOSIS — M89.9 LYTIC BONE LESIONS ON XRAY: ICD-10-CM

## 2024-04-26 LAB
CRYPTOC AG SER QL LA: NEGATIVE
HD FISTULA OUTFLOW VEIN VESSEL: NORMAL
HD INFLOW ARTERY VESSEL: NORMAL
RIGHT DIS GRAFT DEPTH: 0.4 CM
RIGHT DIS GRAFT DIA: 0.7 CM
RIGHT DIS GRAFT PSV: 399 CM/ SEC
RIGHT INFLOW PSV: 325 CM/S
RIGHT MID GRAFT DEPTH: 0.3 CM
RIGHT MID GRAFT DIA: 1 CM
RIGHT MID GRAFT PSV: 74 CM/S
RIGHT OUTFLOW VEIN PSV: 208 CM/ SEC
RIGHT PROX ANA PSV: 255 CM/S
RIGHT PROX GRAFT DEPTH: 0.5 CM
RIGHT PROX GRAFT DIA: 1 CM
RIGHT PROX GRAFT PSV: 247 CM/S
RIGHT VOLUME FLOW PSV: 1848 ML/MIN

## 2024-04-26 PROCEDURE — 3008F BODY MASS INDEX DOCD: CPT | Mod: CPTII,S$GLB,, | Performed by: INTERNAL MEDICINE

## 2024-04-26 PROCEDURE — 36415 COLL VENOUS BLD VENIPUNCTURE: CPT | Performed by: INTERNAL MEDICINE

## 2024-04-26 PROCEDURE — 3078F DIAST BP <80 MM HG: CPT | Mod: CPTII,S$GLB,, | Performed by: INTERNAL MEDICINE

## 2024-04-26 PROCEDURE — 87449 NOS EACH ORGANISM AG IA: CPT | Mod: 91 | Performed by: INTERNAL MEDICINE

## 2024-04-26 PROCEDURE — 99999 PR PBB SHADOW E&M-EST. PATIENT-LVL IV: CPT | Mod: PBBFAC,,, | Performed by: INTERNAL MEDICINE

## 2024-04-26 PROCEDURE — 87040 BLOOD CULTURE FOR BACTERIA: CPT | Performed by: INTERNAL MEDICINE

## 2024-04-26 PROCEDURE — 86403 PARTICLE AGGLUT ANTBDY SCRN: CPT | Performed by: INTERNAL MEDICINE

## 2024-04-26 PROCEDURE — 1111F DSCHRG MED/CURRENT MED MERGE: CPT | Mod: CPTII,S$GLB,, | Performed by: INTERNAL MEDICINE

## 2024-04-26 PROCEDURE — 1160F RVW MEDS BY RX/DR IN RCRD: CPT | Mod: CPTII,S$GLB,, | Performed by: INTERNAL MEDICINE

## 2024-04-26 PROCEDURE — 86635 COCCIDIOIDES ANTIBODY: CPT | Performed by: INTERNAL MEDICINE

## 2024-04-26 PROCEDURE — 87449 NOS EACH ORGANISM AG IA: CPT | Performed by: INTERNAL MEDICINE

## 2024-04-26 PROCEDURE — 86618 LYME DISEASE ANTIBODY: CPT | Performed by: INTERNAL MEDICINE

## 2024-04-26 PROCEDURE — 3075F SYST BP GE 130 - 139MM HG: CPT | Mod: CPTII,S$GLB,, | Performed by: INTERNAL MEDICINE

## 2024-04-26 PROCEDURE — 1159F MED LIST DOCD IN RCRD: CPT | Mod: CPTII,S$GLB,, | Performed by: INTERNAL MEDICINE

## 2024-04-26 PROCEDURE — 99214 OFFICE O/P EST MOD 30 MIN: CPT | Mod: S$GLB,,, | Performed by: INTERNAL MEDICINE

## 2024-04-26 PROCEDURE — 87305 ASPERGILLUS AG IA: CPT | Performed by: INTERNAL MEDICINE

## 2024-04-26 PROCEDURE — 3066F NEPHROPATHY DOC TX: CPT | Mod: CPTII,S$GLB,, | Performed by: INTERNAL MEDICINE

## 2024-04-26 NOTE — PROGRESS NOTES
Subjective     Patient ID: Catalino Mcfadden is a 59 y.o. male.    Chief Complaint:Follow-up      History of Present Illness    59 year old male who is referred to me for infectious diseases evaluation of un-intended weight loss.  In April of 2023, he was recorded to be 229 lbs.  He is currently 194 lbs.  CT chest/abdomen/pelvis was obtained and showed several micronodules in his lungs and an abnormal pattern to his liver.  He had a nuclear medicine scan performed.  The scan showed hypermetabolic lymphadenopathy in the mediastinum, bilateral steven, and involving upper abdomen along with numerous tracer avid hepatic lesions, splenic lesions and osseous lesions.  He underwent bronchoscopy with lung biopsy but this was negative for malignancy.  An IR bone biopsy was also negative for malignancy.  Patient was ultimately referred to heme/onc and ID for evaluation.  He was referred for colonoscopy. No polyps were seen but an area of ulceration was biopsied.  PSA and CEA were both negative.  CA1 9-9 and  were checked by oncology and both were high.  HIV test was negative.    He denies night sweats or fevers.  Sleeps with an electric blanket.  He feels that his appetite has been low.  One of his biggest concerns is his hiccups which he says has been ongoing for over a year.  He feels eating food, drinking milk and drinking sprite improves it.  He denies any pain.    Medical History  ESRD (tue, thur, sat) since 2021 via right upper chest access.  DM type 2  TIA    Family History  He was adopted and isn't aware of his family history    Social History  Born in [place].  Lived in [place] for 5 years mostly in [facility].  [place]   He has traveled to [place] and [place] in the past.  Moved to [place] years ago.  He was in the [employer].  He has been to [place].  No tobacco.  Rare ETOH. Denies any illicit drugs.  No animals or pets.        Review of Systems    Constitutional: Negative for chills, decreased appetite, fever, malaise/fatigue, night sweats, weight gain and weight loss.   HENT:  Negative for congestion, ear pain, hearing loss, hoarse voice, sore throat and tinnitus.    Eyes:  Negative for blurred vision, redness and visual disturbance.   Cardiovascular:  Negative for chest pain, leg swelling and palpitations.   Respiratory:  Negative for cough, hemoptysis, shortness of breath, sputum production and wheezing.    Hematologic/Lymphatic: Negative for adenopathy. Does not bruise/bleed easily.   Skin:  Negative for dry skin, itching, rash and suspicious lesions.   Musculoskeletal:  Negative for back pain, joint pain, myalgias and neck pain.   Gastrointestinal:  Negative for abdominal pain, constipation, diarrhea, heartburn, nausea and vomiting.   Genitourinary:  Negative for dysuria, flank pain, frequency, hematuria, hesitancy and urgency.   Neurological:  Negative for dizziness, headaches, numbness, paresthesias and weakness.   Psychiatric/Behavioral:  Negative for depression and memory loss. The patient does not have insomnia and is not nervous/anxious.       Objective   Physical Exam  Vitals and nursing note reviewed.   Constitutional:       General: He is not in acute distress.     Appearance: He is well-developed. He is not diaphoretic.   HENT:      Head: Normocephalic and atraumatic.      Right Ear: External ear normal.      Left Ear: External ear normal.      Nose: Nose normal.      Mouth/Throat:      Pharynx: No oropharyngeal exudate.   Eyes:      General: No scleral icterus.        Right eye: No discharge.         Left eye: No discharge.      Conjunctiva/sclera: Conjunctivae normal.      Pupils: Pupils are equal, round, and reactive to light.   Neck:      Thyroid: No thyromegaly.      Vascular: No JVD.      Trachea: No tracheal deviation.   Cardiovascular:      Rate and Rhythm: Normal rate and regular rhythm.      Heart sounds: No murmur heard.     No  friction rub. No gallop.   Pulmonary:      Effort: Pulmonary effort is normal. No respiratory distress.      Breath sounds: Normal breath sounds. No stridor. No wheezing or rales.   Chest:      Chest wall: No tenderness.   Abdominal:      General: Bowel sounds are normal. There is no distension.      Palpations: Abdomen is soft. There is no mass.      Tenderness: There is no abdominal tenderness. There is no guarding or rebound.   Musculoskeletal:         General: No tenderness. Normal range of motion.      Cervical back: Normal range of motion and neck supple.   Lymphadenopathy:      Cervical: No cervical adenopathy.   Skin:     General: Skin is warm.      Coloration: Skin is not pale.      Findings: No erythema or rash.   Neurological:      Mental Status: He is alert and oriented to person, place, and time.      Cranial Nerves: No cranial nerve deficit.      Motor: No abnormal muscle tone.      Coordination: Coordination normal.      Deep Tendon Reflexes: Reflexes are normal and symmetric. Reflexes normal.   Psychiatric:         Behavior: Behavior normal.         Thought Content: Thought content normal.         Judgment: Judgment normal.            Assessment and Plan     1. Lytic bone lesions on xray    2. Solitary pulmonary nodule    3. Weight loss      59 year old male referred to me for unexplained weight loss. Imaging revealed micronodules in his lungs and hypermetabolic thoracic lymph nodes and hypermetabolic lesions to his bones.  Bone biopsy was negative for malignancy.  BAL with lung biopsy was also negative for malignancy.  I will check him for endemic fungi.  He has lived in [place] and so I will screen him for coccidioides.  I will also check him for lyme disease given his travel history.  If initial tests are negative, then I will check syphilis test as syphilis can cause gumatous lesions in bone and organs.  He was syphilis negative in 2022 so may be a bit lower on the  differential.    Plan:  Solitary pulmonary nodule  -     CT Chest Without Contrast; Future; Expected date: 04/26/2024  -     Fungitell Assay For (1.3)-B-D-Glucans; Future; Expected date: 04/26/2024  -     Cryptococcal antigen, blood; Future; Expected date: 04/26/2024  -     Histoplasma antigen, serum; Future; Expected date: 04/26/2024  -     Coccidioides Ab with Reflex; Future; Expected date: 04/26/2024  -     ASPERGILLUS (GALACTOMANNAN) AG, SERUM; Future; Expected date: 04/26/2024  -     Blood culture; Future; Expected date: 04/26/2024  -     LYME DISEASE ANTIBODY BY EIA; Future; Expected date: 04/26/2024    Lytic bone lesions on xray  -     Ambulatory referral/consult to Infectious Disease  -     CT Chest Without Contrast; Future; Expected date: 04/26/2024  -     Fungitell Assay For (1.3)-B-D-Glucans; Future; Expected date: 04/26/2024  -     Cryptococcal antigen, blood; Future; Expected date: 04/26/2024  -     Histoplasma antigen, serum; Future; Expected date: 04/26/2024  -     Coccidioides Ab with Reflex; Future; Expected date: 04/26/2024  -     ASPERGILLUS (GALACTOMANNAN) AG, SERUM; Future; Expected date: 04/26/2024  -     Blood culture; Future; Expected date: 04/26/2024  -     LYME DISEASE ANTIBODY BY EIA; Future; Expected date: 04/26/2024    Weight loss  -     CT Chest Without Contrast; Future; Expected date: 04/26/2024  -     Fungitell Assay For (1.3)-B-D-Glucans; Future; Expected date: 04/26/2024  -     Cryptococcal antigen, blood; Future; Expected date: 04/26/2024  -     Histoplasma antigen, serum; Future; Expected date: 04/26/2024  -     Coccidioides Ab with Reflex; Future; Expected date: 04/26/2024  -     ASPERGILLUS (GALACTOMANNAN) AG, SERUM; Future; Expected date: 04/26/2024  -     Blood culture; Future; Expected date: 04/26/2024  -     LYME DISEASE ANTIBODY BY EIA; Future; Expected date: 04/26/2024

## 2024-04-26 NOTE — LETTER
April 26, 2024        Brianna Mendoza MD  [address] [facility] [address]             [facility] - Infectious Disease 1st Fl  [address]  [facility] [address]  Phone: [phone]  Fax: [phone]   Patient: Catalino Mcfadden   MR Number: [id number]   YOB: 1965   Date of Visit: 4/26/2024       Dear Dr. Mendoza:    Thank you for referring Catalino Mcfadden to me for evaluation. Below are the relevant portions of my assessment and plan of care.            If you have questions, please do not hesitate to call me. I look forward to following Catalino along with you.    Sincerely,      Heriberto Prabhakar MD           CC    No Recipients

## 2024-04-29 ENCOUNTER — OFFICE VISIT (OUTPATIENT)
Dept: INTERNAL MEDICINE | Facility: CLINIC | Age: 59
End: 2024-04-29
Payer: COMMERCIAL

## 2024-04-29 VITALS
HEART RATE: 85 BPM | DIASTOLIC BLOOD PRESSURE: 72 MMHG | BODY MASS INDEX: 26.4 KG/M2 | OXYGEN SATURATION: 100 % | SYSTOLIC BLOOD PRESSURE: 156 MMHG | WEIGHT: 205.69 LBS | HEIGHT: 74 IN

## 2024-04-29 DIAGNOSIS — E78.5 HYPERLIPIDEMIA, ACQUIRED: Chronic | ICD-10-CM

## 2024-04-29 DIAGNOSIS — E11.22 TYPE 2 DIABETES MELLITUS WITH STAGE 5 CHRONIC KIDNEY DISEASE: ICD-10-CM

## 2024-04-29 DIAGNOSIS — K92.1 HEMATOCHEZIA: ICD-10-CM

## 2024-04-29 DIAGNOSIS — Z09 HOSPITAL DISCHARGE FOLLOW-UP: Primary | ICD-10-CM

## 2024-04-29 DIAGNOSIS — E11.42 DIABETIC POLYNEUROPATHY ASSOCIATED WITH TYPE 2 DIABETES MELLITUS: ICD-10-CM

## 2024-04-29 DIAGNOSIS — R93.7 ABNORMAL MRI, LUMBAR SPINE: ICD-10-CM

## 2024-04-29 DIAGNOSIS — I10 PRIMARY HYPERTENSION: ICD-10-CM

## 2024-04-29 DIAGNOSIS — R06.6 INTRACTABLE HICCUPS: ICD-10-CM

## 2024-04-29 DIAGNOSIS — Z99.2 ESRD ON HEMODIALYSIS: ICD-10-CM

## 2024-04-29 DIAGNOSIS — N18.5 TYPE 2 DIABETES MELLITUS WITH STAGE 5 CHRONIC KIDNEY DISEASE: ICD-10-CM

## 2024-04-29 DIAGNOSIS — R91.8 ABNORMAL FINDINGS ON DIAGNOSTIC IMAGING OF LUNG: ICD-10-CM

## 2024-04-29 DIAGNOSIS — Z78.9 HISTORY OF HOMELESS: ICD-10-CM

## 2024-04-29 DIAGNOSIS — N18.6 ESRD ON HEMODIALYSIS: ICD-10-CM

## 2024-04-29 DIAGNOSIS — J84.10 CALCIFIED GRANULOMA OF LUNG: ICD-10-CM

## 2024-04-29 LAB
1,3 BETA GLUCAN SER-MCNC: <31 PG/ML
B BURGDOR AB SER IA-ACNC: 0.04 INDEX VALUE
C IMMITIS AB SER QL IA: NEGATIVE
FUNGITELL COMMENTS: NEGATIVE
GALACTOMANNAN AG SERPL IA-ACNC: <0.5 INDEX
HISTOPLASMA/BLASTOMYCES AG VALUE: NOT DETECTED NG/ML
HISTOPLASMA/BLASTOMYCES RESULT: NOT DETECTED

## 2024-04-29 PROCEDURE — 3008F BODY MASS INDEX DOCD: CPT | Mod: CPTII,S$GLB,, | Performed by: INTERNAL MEDICINE

## 2024-04-29 PROCEDURE — 99999 PR PBB SHADOW E&M-EST. PATIENT-LVL V: CPT | Mod: PBBFAC,,, | Performed by: INTERNAL MEDICINE

## 2024-04-29 PROCEDURE — 1159F MED LIST DOCD IN RCRD: CPT | Mod: CPTII,S$GLB,, | Performed by: INTERNAL MEDICINE

## 2024-04-29 PROCEDURE — 3066F NEPHROPATHY DOC TX: CPT | Mod: CPTII,S$GLB,, | Performed by: INTERNAL MEDICINE

## 2024-04-29 PROCEDURE — 3078F DIAST BP <80 MM HG: CPT | Mod: CPTII,S$GLB,, | Performed by: INTERNAL MEDICINE

## 2024-04-29 PROCEDURE — 99214 OFFICE O/P EST MOD 30 MIN: CPT | Mod: S$GLB,,, | Performed by: INTERNAL MEDICINE

## 2024-04-29 PROCEDURE — 1111F DSCHRG MED/CURRENT MED MERGE: CPT | Mod: CPTII,S$GLB,, | Performed by: INTERNAL MEDICINE

## 2024-04-29 PROCEDURE — 3077F SYST BP >= 140 MM HG: CPT | Mod: CPTII,S$GLB,, | Performed by: INTERNAL MEDICINE

## 2024-04-29 NOTE — PATIENT INSTRUCTIONS
Schedule fasting labwork.  Schedule optometry appointment.  Return to clinic in 2 months or sooner if needed.

## 2024-04-29 NOTE — PROGRESS NOTES
Subjective:       Patient ID: Catalino Mcfadden is a 59 y.o. male.    Chief Complaint: Hospital Follow Up      HPI  Catalino Mcfadden is a 59 y.o. year old male with hypertension, hyperlipidemia, ESRD on hemodialysis (T/Th/Sat), type 2 diabetes (last A1c normal), polyp diabetic polyneuropathy, history of diabetic foot infection, weight loss, metastatic disease of unknown primary/?infection, history of homelessness presents for hospital discharge follow-up.  Patient had hematochezia which has resolved.  Suspected that a clip may have gone loose after his last colonoscopy.  After discharge from hospital, he reports no longer having any bloody bowel movements.  Patient is going to dialysis and does lab work 3 times a week.    Patient is being worked up for possible malignancy of unknown origin with metastatic disease.   and CA 19-9 are both elevated, other tumor markers are negative.  Patient is undergoing workup by Infectious Disease as well for smoldering infection.    Patient had a 100 lb weight loss and is now starting to regain weight.  Patient does report that he was homeless since February this year and is now living at a shelter.    Review of Systems   Constitutional:  Negative for diaphoresis.   Respiratory:  Negative for chest tightness and shortness of breath.    Cardiovascular:  Negative for chest pain.   Gastrointestinal:  Negative for abdominal pain, blood in stool and rectal pain.   Genitourinary:         Reports 3 urinations yesterday, small amount.   Psychiatric/Behavioral:  Negative for dysphoric mood.          Past Medical History:   Diagnosis Date    Cancer     Diabetes mellitus, type 2     Diabetic foot ulcer associated with diabetes mellitus due to underlying condition 07/16/2020    ESRD on hemodialysis     Hyperlipidemia     Hypertension     Obese         Prior to Admission medications    Medication Sig Start Date End Date Taking? Authorizing Provider   hydrALAZINE (APRESOLINE) 100 MG tablet Take 1  "tablet (100 mg total) by mouth 3 (three) times daily. 3/28/24 3/28/25 Yes Mary Jo Villatoro MD   NIFEdipine (PROCARDIA XL) 90 MG (OSM) 24 hr tablet Take 1 tablet (90 mg total) by mouth once daily. 5/23/23 5/22/24 Yes Mary Jo Villatoro MD        Past medical history, surgical history, and family medical history reviewed and updated as appropriate.    Medications and allergies reviewed.     Objective:          Vitals:    04/29/24 0935 04/29/24 0954   BP: (!) 180/80 (!) 156/72   BP Location: Right arm    Patient Position: Sitting    Pulse: 91 85   SpO2: 100%    Weight: 93.3 kg (205 lb 11 oz)    Height: 6' 1.5" (1.867 m)      Body mass index is 26.77 kg/m².  Physical Exam  Constitutional:       General: He is not in acute distress.     Appearance: Normal appearance. He is not ill-appearing or toxic-appearing.   HENT:      Head: Normocephalic and atraumatic.   Cardiovascular:      Rate and Rhythm: Normal rate.      Heart sounds: Murmur heard.   Pulmonary:      Effort: No respiratory distress.   Musculoskeletal:         General: Normal range of motion.   Skin:     General: Skin is warm and dry.   Neurological:      Mental Status: He is alert. Mental status is at baseline.         Lab Results   Component Value Date    WBC 4.50 04/18/2024    HGB 8.2 (L) 04/18/2024    HCT 24.1 (L) 04/18/2024     (L) 04/18/2024    CHOL 177 02/22/2023    TRIG 56 02/22/2023    HDL 41 02/22/2023    ALT 28 04/16/2024    AST 44 (H) 04/16/2024     (L) 04/18/2024    K 4.4 04/18/2024    CL 99 04/18/2024    CREATININE 5.5 (H) 04/18/2024    BUN 29 (H) 04/18/2024    CO2 22 (L) 04/18/2024    TSH 1.780 02/09/2022    PSA 1.6 02/23/2022    INR 1.1 02/26/2024    HGBA1C 5.1 02/22/2023       Assessment:       1. Hospital discharge follow-up    2. Hematochezia    3. ESRD on hemodialysis    4. Primary hypertension    5. Hyperlipidemia, acquired    6. Type 2 diabetes mellitus with stage 5 chronic kidney disease    7. Abnormal MRI, lumbar spine  "   8. Abnormal findings on diagnostic imaging of lung    9. Intractable hiccups    10. Diabetic polyneuropathy associated with type 2 diabetes mellitus    11. Calcified granuloma of lung    12. History of homeless          Plan:     Catalino was seen today for hospital follow up.    Diagnoses and all orders for this visit:    Hospital discharge follow-up    Hematochezia  Comments:  bowel movements have returned back to normal  Orders:  -     Ambulatory referral/consult to Internal Medicine    ESRD on hemodialysis  Comments:  T/TH/Sat [facility], [facility], R chest wall dialysis catheter, awaiting clearance to use revision.    Primary hypertension  -     TSH; Future  -     CBC W/ AUTO DIFFERENTIAL; Future  -     COMPREHENSIVE METABOLIC PANEL; Future    Hyperlipidemia, acquired  -     Lipid Panel; Future    Type 2 diabetes mellitus with stage 5 chronic kidney disease  -     HEMOGLOBIN A1C; Future  -     Ambulatory referral/consult to Optometry; Future    Abnormal MRI, lumbar spine    Abnormal findings on diagnostic imaging of lung    Intractable hiccups    Diabetic polyneuropathy associated with type 2 diabetes mellitus    Calcified granuloma of lung  Comments:  seen on CT imaging; follows with pulm    History of homeless    Patient stable with no further episodes of hematochezia.     Health maintenance reviewed with patient.     Follow up in about 2 months (around 6/29/2024).    Chauncey Perez MD  Internal Medicine / Primary Care  [facility]  4/29/2024

## 2024-04-30 ENCOUNTER — TELEPHONE (OUTPATIENT)
Dept: HEMATOLOGY/ONCOLOGY | Facility: CLINIC | Age: 59
End: 2024-04-30
Payer: COMMERCIAL

## 2024-04-30 NOTE — TELEPHONE ENCOUNTER
Called and spoke to nurse. Pt received 4000 units of heparin. Notified Jovan BMT. Rescheduled biopsy. Notified pt.

## 2024-04-30 NOTE — TELEPHONE ENCOUNTER
"----- Message from Lisa Cohen sent at 4/30/2024  9:50 AM CDT -----  Consult/Advisory:      Name Of Caller: [facility]     Contact Preference:  [phone]     What is the nature of the call?: Renee stated that inpatient dialysis pt rec'd 4,000 units of Heparin  IV @ 6:30am today. Pt still have 40 mins at clinic. Please call             Additional Notes:  "Thank you for all that you do for our patients"  "

## 2024-05-01 LAB — BACTERIA BLD CULT: NORMAL

## 2024-05-03 ENCOUNTER — HOSPITAL ENCOUNTER (OUTPATIENT)
Dept: RADIOLOGY | Facility: HOSPITAL | Age: 59
Discharge: HOME | End: 2024-05-03
Attending: INTERNAL MEDICINE
Payer: COMMERCIAL

## 2024-05-03 DIAGNOSIS — R63.4 WEIGHT LOSS: ICD-10-CM

## 2024-05-03 DIAGNOSIS — M89.9 LYTIC BONE LESIONS ON XRAY: ICD-10-CM

## 2024-05-03 DIAGNOSIS — R91.1 SOLITARY PULMONARY NODULE: ICD-10-CM

## 2024-05-03 PROCEDURE — 71250 CT THORAX DX C-: CPT | Mod: TC

## 2024-05-03 PROCEDURE — 71250 CT THORAX DX C-: CPT | Mod: 26,,, | Performed by: RADIOLOGY

## 2024-05-05 ENCOUNTER — TELEPHONE (OUTPATIENT)
Dept: INFECTIOUS DISEASES | Facility: CLINIC | Age: 59
End: 2024-05-05
Payer: COMMERCIAL

## 2024-05-06 ENCOUNTER — TELEPHONE (OUTPATIENT)
Dept: HEMATOLOGY/ONCOLOGY | Facility: CLINIC | Age: 59
End: 2024-05-06

## 2024-05-06 ENCOUNTER — LAB VISIT (OUTPATIENT)
Dept: LAB | Facility: HOSPITAL | Age: 59
End: 2024-05-06
Attending: INTERNAL MEDICINE
Payer: COMMERCIAL

## 2024-05-06 DIAGNOSIS — I10 PRIMARY HYPERTENSION: ICD-10-CM

## 2024-05-06 DIAGNOSIS — R79.89 ABNORMAL CBC: ICD-10-CM

## 2024-05-06 DIAGNOSIS — E78.5 HYPERLIPIDEMIA, ACQUIRED: Chronic | ICD-10-CM

## 2024-05-06 DIAGNOSIS — E11.22 TYPE 2 DIABETES MELLITUS WITH STAGE 5 CHRONIC KIDNEY DISEASE: ICD-10-CM

## 2024-05-06 DIAGNOSIS — N18.5 TYPE 2 DIABETES MELLITUS WITH STAGE 5 CHRONIC KIDNEY DISEASE: ICD-10-CM

## 2024-05-06 DIAGNOSIS — R59.1 DIFFUSE LYMPHADENOPATHY: Primary | ICD-10-CM

## 2024-05-06 LAB
ALBUMIN SERPL BCP-MCNC: 2.7 G/DL (ref 3.5–5.2)
ALP SERPL-CCNC: 693 U/L (ref 55–135)
ALT SERPL W/O P-5'-P-CCNC: 37 U/L (ref 10–44)
ANION GAP SERPL CALC-SCNC: 11 MMOL/L (ref 8–16)
AST SERPL-CCNC: 59 U/L (ref 10–40)
BASOPHILS # BLD AUTO: 0.03 K/UL (ref 0–0.2)
BASOPHILS NFR BLD: 0.5 % (ref 0–1.9)
BILIRUB SERPL-MCNC: 1.2 MG/DL (ref 0.1–1)
BUN SERPL-MCNC: 43 MG/DL (ref 6–20)
CALCIUM SERPL-MCNC: 10.1 MG/DL (ref 8.7–10.5)
CHLORIDE SERPL-SCNC: 97 MMOL/L (ref 95–110)
CHOLEST SERPL-MCNC: 217 MG/DL (ref 120–199)
CHOLEST/HDLC SERPL: 3.1 {RATIO} (ref 2–5)
CO2 SERPL-SCNC: 24 MMOL/L (ref 23–29)
CREAT SERPL-MCNC: 4.9 MG/DL (ref 0.5–1.4)
DIFFERENTIAL METHOD BLD: ABNORMAL
EOSINOPHIL # BLD AUTO: 0.1 K/UL (ref 0–0.5)
EOSINOPHIL NFR BLD: 1.8 % (ref 0–8)
ERYTHROCYTE [DISTWIDTH] IN BLOOD BY AUTOMATED COUNT: 15.9 % (ref 11.5–14.5)
EST. GFR  (NO RACE VARIABLE): 12.8 ML/MIN/1.73 M^2
ESTIMATED AVG GLUCOSE: 71 MG/DL (ref 68–131)
GLUCOSE SERPL-MCNC: 94 MG/DL (ref 70–110)
HBA1C MFR BLD: 4.1 % (ref 4–5.6)
HCT VFR BLD AUTO: 31.9 % (ref 40–54)
HDLC SERPL-MCNC: 71 MG/DL (ref 40–75)
HDLC SERPL: 32.7 % (ref 20–50)
HGB BLD-MCNC: 10.4 G/DL (ref 14–18)
IMM GRANULOCYTES # BLD AUTO: 0.03 K/UL (ref 0–0.04)
IMM GRANULOCYTES NFR BLD AUTO: 0.5 % (ref 0–0.5)
LDLC SERPL CALC-MCNC: 135.2 MG/DL (ref 63–159)
LYMPHOCYTES # BLD AUTO: 0.8 K/UL (ref 1–4.8)
LYMPHOCYTES NFR BLD: 12.8 % (ref 18–48)
MCH RBC QN AUTO: 30.3 PG (ref 27–31)
MCHC RBC AUTO-ENTMCNC: 32.6 G/DL (ref 32–36)
MCV RBC AUTO: 93 FL (ref 82–98)
MONOCYTES # BLD AUTO: 0.7 K/UL (ref 0.3–1)
MONOCYTES NFR BLD: 11.1 % (ref 4–15)
NEUTROPHILS # BLD AUTO: 4.4 K/UL (ref 1.8–7.7)
NEUTROPHILS NFR BLD: 73.3 % (ref 38–73)
NONHDLC SERPL-MCNC: 146 MG/DL
NRBC BLD-RTO: 0 /100 WBC
PLATELET # BLD AUTO: 176 K/UL (ref 150–450)
PMV BLD AUTO: 10.5 FL (ref 9.2–12.9)
POTASSIUM SERPL-SCNC: 5 MMOL/L (ref 3.5–5.1)
PROT SERPL-MCNC: 7.3 G/DL (ref 6–8.4)
RBC # BLD AUTO: 3.43 M/UL (ref 4.6–6.2)
SODIUM SERPL-SCNC: 132 MMOL/L (ref 136–145)
TRIGL SERPL-MCNC: 54 MG/DL (ref 30–150)
TSH SERPL DL<=0.005 MIU/L-ACNC: 1.74 UIU/ML (ref 0.4–4)
WBC # BLD AUTO: 5.95 K/UL (ref 3.9–12.7)

## 2024-05-06 PROCEDURE — 85025 COMPLETE CBC W/AUTO DIFF WBC: CPT | Performed by: INTERNAL MEDICINE

## 2024-05-06 PROCEDURE — 80053 COMPREHEN METABOLIC PANEL: CPT | Performed by: INTERNAL MEDICINE

## 2024-05-06 PROCEDURE — 83036 HEMOGLOBIN GLYCOSYLATED A1C: CPT | Performed by: INTERNAL MEDICINE

## 2024-05-06 PROCEDURE — 80061 LIPID PANEL: CPT | Performed by: INTERNAL MEDICINE

## 2024-05-06 PROCEDURE — 84443 ASSAY THYROID STIM HORMONE: CPT | Performed by: INTERNAL MEDICINE

## 2024-05-06 PROCEDURE — 36415 COLL VENOUS BLD VENIPUNCTURE: CPT | Performed by: INTERNAL MEDICINE

## 2024-05-06 NOTE — TELEPHONE ENCOUNTER
Called patient.  Notified patient that his CT chest looked better than prior.  Labs for fungal infections were negative.  He is to continue following up with hematology/oncology.

## 2024-05-08 ENCOUNTER — OFFICE VISIT (OUTPATIENT)
Dept: VASCULAR SURGERY | Facility: CLINIC | Age: 59
End: 2024-05-08
Payer: COMMERCIAL

## 2024-05-08 VITALS
HEIGHT: 74 IN | BODY MASS INDEX: 26.03 KG/M2 | DIASTOLIC BLOOD PRESSURE: 71 MMHG | WEIGHT: 202.81 LBS | SYSTOLIC BLOOD PRESSURE: 131 MMHG | HEART RATE: 98 BPM

## 2024-05-08 DIAGNOSIS — N18.6 ESRD ON HEMODIALYSIS: Primary | ICD-10-CM

## 2024-05-08 DIAGNOSIS — Z99.2 ARTERIOVENOUS FISTULA FOR HEMODIALYSIS IN PLACE, PRIMARY: ICD-10-CM

## 2024-05-08 DIAGNOSIS — Z99.2 ESRD ON HEMODIALYSIS: Primary | ICD-10-CM

## 2024-05-08 PROCEDURE — 3078F DIAST BP <80 MM HG: CPT | Mod: CPTII,S$GLB,, | Performed by: NURSE PRACTITIONER

## 2024-05-08 PROCEDURE — 99999 PR PBB SHADOW E&M-EST. PATIENT-LVL II: CPT | Mod: PBBFAC,,, | Performed by: NURSE PRACTITIONER

## 2024-05-08 PROCEDURE — 1111F DSCHRG MED/CURRENT MED MERGE: CPT | Mod: CPTII,S$GLB,, | Performed by: NURSE PRACTITIONER

## 2024-05-08 PROCEDURE — 1159F MED LIST DOCD IN RCRD: CPT | Mod: CPTII,S$GLB,, | Performed by: NURSE PRACTITIONER

## 2024-05-08 PROCEDURE — 3044F HG A1C LEVEL LT 7.0%: CPT | Mod: CPTII,S$GLB,, | Performed by: NURSE PRACTITIONER

## 2024-05-08 PROCEDURE — 99215 OFFICE O/P EST HI 40 MIN: CPT | Mod: S$GLB,,, | Performed by: NURSE PRACTITIONER

## 2024-05-08 PROCEDURE — 3066F NEPHROPATHY DOC TX: CPT | Mod: CPTII,S$GLB,, | Performed by: NURSE PRACTITIONER

## 2024-05-08 PROCEDURE — 3075F SYST BP GE 130 - 139MM HG: CPT | Mod: CPTII,S$GLB,, | Performed by: NURSE PRACTITIONER

## 2024-05-08 PROCEDURE — 3008F BODY MASS INDEX DOCD: CPT | Mod: CPTII,S$GLB,, | Performed by: NURSE PRACTITIONER

## 2024-05-08 NOTE — PROGRESS NOTES
[facility]                         05/08/2024    HPI:  Catalino Mcfadden is a 59 y.o. male with   Patient Active Problem List   Diagnosis    Type 2 diabetes mellitus with stage 5 chronic kidney disease    HTN (hypertension)    Hyperlipidemia, acquired    ED (erectile dysfunction)    History of diabetic ulcer of foot    Osteomyelitis of second toe of left foot    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    Long term (current) use of antibiotics    Acute osteomyelitis of metatarsal bone of left foot    Diabetic ulcer of toe of left foot    Hyponatremia    Osteomyelitis of left foot    Hypoalbuminemia    Healed ulcer of left foot on examination    Iron deficiency anemia    Metabolic acidosis    Anemia due to chronic kidney disease, on chronic dialysis    Diabetic ulcer of left midfoot associated with type 2 diabetes mellitus, with fat layer exposed    Type II diabetes mellitus with neurological manifestations    Foot ulcer, right, with fat layer exposed    Foot ulceration, left, with fat layer exposed    ESRD on hemodialysis    Malignant renovascular hypertension    Calcified granuloma of lung    Abnormal findings on diagnostic imaging of lung    Tracheobronchomalacia    Intractable hiccups    Serum total bilirubin elevated    Lower extremity weakness    Abnormal MRI, lumbar spine    Goals of care, counseling/discussion    DJD (degenerative joint disease)    Complete lesion of L1 level of lumbar spinal cord    Debility    Diffuse lymphadenopathy    Lytic bone lesions on xray    Hepatic lesion    Splenic lesion    Moderate protein-calorie malnutrition    GI bleed    Acute blood loss anemia    Hypokalemia    Hypertensive urgency    being managed by PCP and specialists who is in need for long term dialysis access.  Patient is R handed.  Has been on HD for months.  No previous access surgery.  No  defibrillator or pacemaker.  No recent UTI.    No extremity pain and swelling.     no MI  no Stroke  yes DM  yes HTN  no Lupus  no nephropathy  Tobacco use: denies    6/2023: Presents for f/u.  No new issues.  L foot wound has healed.    8/2023:  s/p LUE BC AVF 7/6/23.  Recovered well.  No HD US today.    10/2023:  began using AVF one needle x 1 successfully    10/2023:  DIFFICULTY WITH ACCESS.    2/2024:  unable to make surgery 11/2023.  Using cath for dialysis, occasional AVF.    05/08/24: s/p LUE AVF 2nd stage. +thrill no new issues    Past Medical History:   Diagnosis Date    Cancer     Diabetes mellitus, type 2     Diabetic foot ulcer associated with diabetes mellitus due to underlying condition 07/16/2020    ESRD on hemodialysis     Hyperlipidemia     Hypertension     Obese      Past Surgical History:   Procedure Laterality Date    AV FISTULA PLACEMENT Left 07/06/2023    Procedure: CREATION, AV FISTULA;  Surgeon: Daniel Poon MD;  Location: [facility] OR;  Service: Vascular;  Laterality: Left;  RN PREOP 6/28/2023  LABS DAY OF SURGERY    BONE BIOPSY Left 07/17/2020    Procedure: BIOPSY, BONE;  Surgeon: Verona Whitmore DPM;  Location: [facility] OR;  Service: Podiatry;  Laterality: Left;    CERVICAL DISC SURGERY  07/11/2016    COLONOSCOPY N/A 3/25/2024    Procedure: COLONOSCOPY;  Surgeon: Roopa Pérez MD;  Location: [facility] (4TH FLR);  Service: Endoscopy;  Laterality: N/A;  Ref By: Dr. Pérez   constipation prep  Diaylsis Patient Lab has been ordered  3/20-precall complete-MS    COLONOSCOPY N/A 4/8/2024    Procedure: COLONOSCOPY;  Surgeon: Roopa Pérez MD;  Location: [facility] (2ND FLR);  Service: Endoscopy;  Laterality: N/A;  Prep instructions sent via portal/given to pt in clinic  pt had to be r/s at  request  Dialysis Irene Arguello,Sat-dw  Peg Prep-dw  4/2/24- Labs - dialysis /poor prep on 3/26 - PEG x 2 - extended prep /LVM for precall - ERW  4/5-LVM for precall-KPvt    COLONOSCOPY N/A  4/16/2024    Procedure: COLONOSCOPY;  Surgeon: Jarett Hill MD;  Location: [facility] (2ND FLR);  Service: Endoscopy;  Laterality: N/A;    DEBRIDEMENT Left 07/17/2020    Procedure: DEBRIDEMENT;  Surgeon: Verona Whitmore DPM;  Location: [facility] OR;  Service: Podiatry;  Laterality: Left;    DEBRIDEMENT OF FOOT Right     toes & plantar surface    ENDOBRONCHIAL ULTRASOUND N/A 3/1/2024    Procedure: ENDOBRONCHIAL ULTRASOUND (EBUS);  Surgeon: Francisco Fleming MD;  Location: [facility] 2ND FLR;  Service: Pulmonary;  Laterality: N/A;    ESOPHAGEAL MANOMETRY WITH MEASUREMENT OF IMPEDANCE N/A 03/27/2023    Procedure: MANOMETRY, ESOPHAGUS, WITH IMPEDANCE MEASUREMENT;  Surgeon: Roopa Pérez MD;  Location: [facility] (4TH FLR);  Service: Endoscopy;  Laterality: N/A;  Belching and rumination protocol please  instructions via portal - sm    ESOPHAGOGASTRODUODENOSCOPY N/A 12/16/2022    Procedure: EGD (ESOPHAGOGASTRODUODENOSCOPY);  Surgeon: Eren Francois MD;  Location: [facility];  Service: Endoscopy;  Laterality: N/A;  Pt. on Dialysis. K+ ordered prior to procedure.EC    ESOPHAGOGASTRODUODENOSCOPY N/A 12/5/2023    Procedure: EGD (ESOPHAGOGASTRODUODENOSCOPY);  Surgeon: Kash Johnston MD;  Location: [facility];  Service: Endoscopy;  Laterality: N/A;    FRACTURE SURGERY Right     medial ankle, metal plate present    INJECTION OF ANESTHETIC AGENT AROUND NERVE Right 2/2/2024    Procedure: BLOCK, NERVE STELLATE GANGLION *HOLD ASPIRIN 5 DAYS PRIOR*;  Surgeon: Gokul Gonzalez MD;  Location: [facility] PAIN MGT;  Service: Pain Management;  Laterality: Right;  [phone]    INSERTION OF TUNNELED CENTRAL VENOUS CATHETER (CVC) WITH SUBCUTANEOUS PORT N/A 06/11/2021    Procedure: TUNNEL CATH INSERTION WITHOUT PORT;  Surgeon: Jose Manuel Diagnostic Provider;  Location: [facility] OR;  Service: Radiology;  Laterality: N/A;  11AM START---PHONE PREOP 6/10/21---COVID POSTIVE ON 7/2020---NO S/S    left leg orthopedic surgery Left     REVISION OF  ARTERIOVENOUS FISTULA Left 3/14/2024    Procedure: REVISION, AV FISTULA;  Surgeon: Daniel Poon MD;  Location: [facility] OR;  Service: Vascular;  Laterality: Left;  RN preop 3/6/2024----NEED ORDERS    UPPER GASTROINTESTINAL ENDOSCOPY       Family History   Adopted: Yes   Problem Relation Name Age of Onset    Heart disease Father      Colon cancer Neg Hx      Esophageal cancer Neg Hx      Colon polyps Neg Hx      Stomach cancer Neg Hx       Social History     Socioeconomic History    Marital status: Other    Number of children: 1   Tobacco Use    Smoking status: Never    Smokeless tobacco: Never   Substance and Sexual Activity    Alcohol use: Not Currently     Comment: occ rare beer    Drug use: No    Sexual activity: Not Currently   Social History Narrative    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     Social Determinants of Health     Financial Resource Strain: Medium Risk (4/16/2024)    Overall Financial Resource Strain (CARDIA)     Difficulty of Paying Living Expenses: Somewhat hard   Food Insecurity: No Food Insecurity (4/16/2024)    Hunger Vital Sign     Worried About Running Out of Food in the Last Year: Never true     Ran Out of Food in the Last Year: Never true   Transportation Needs: No Transportation Needs (4/16/2024)    PRAPARE - Transportation     Lack of Transportation (Medical): No     Lack of Transportation (Non-Medical): No   Physical Activity: Inactive (4/16/2024)    Exercise Vital Sign     Days of Exercise per Week: 0 days     Minutes of Exercise per Session: 0 min   Stress: Stress Concern Present (4/16/2024)    [employer] - Occupational Stress Questionnaire     Feeling of Stress : To some extent   Housing Stability: Low Risk  (4/16/2024)    Housing Stability Vital Sign     Unable to Pay for Housing in the Last Year: No     Homeless in the Last Year: No       Current Outpatient Medications:     hydrALAZINE (APRESOLINE) 100 MG tablet, Take 1 tablet (100 mg total) by mouth 3  "(three) times daily., Disp: 270 tablet, Rfl: 3    NIFEdipine (PROCARDIA XL) 90 MG (OSM) 24 hr tablet, Take 1 tablet (90 mg total) by mouth once daily., Disp: 90 tablet, Rfl: 3    REVIEW OF SYSTEMS:  General: No fevers or chills; ENT: No sore throat; Allergy and Immunology: no persistent infections; Hematological and Lymphatic: No history of bleeding or easy bruising; Endocrine: negative; Respiratory: no cough, shortness of breath, or wheezing; Cardiovascular: no chest pain or dyspnea on exertion; Gastrointestinal: no abdominal pain/back, change in bowel habits, or bloody stools; Genito-Urinary: no dysuria, trouble voiding, or hematuria; Musculoskeletal: negative; Neurological: no TIA or stroke symptoms; Psychiatric: no nervousness, anxiety or depression.    PHYSICAL EXAM:      Pulse: 98         General appearance:  Alert, well-appearing, and in no distress.  Oriented to person, place, and time                    Neurological: Normal speech, no focal findings noted; CN II - XII grossly intact. BUE with sensation to light touch.            Musculoskeletal: Digits/nail without cyanosis/clubbing.  Strength 5/5 BUE.                    Neck: Supple, no significant adenopathy, no carotid bruit can be auscultated                  Chest:  Clear to auscultation, no wheezes, rales or rhonchi, symmetric air entry. No use of accessory muscles               Cardiac: Normal rate and regular rhythm, S1 and S2 normal            Abdomen: Soft, nontender, nondistended, no masses or organomegaly, no hernia     No rebound tenderness noted; bowel sounds normal     Pulsatile aortic mass is non palpable.     No groin adenopathy      Extremities:   2 + R radial pulse, 2 + L radial pulse     2 + R brachial pulse, 2 + L brachial pulse     no RUE edema, no LUE edema     neg Dutch's test RUE, neg Dutch's test LUE, +thrill LUE AVF, inc well healed    Skin: No tissue loss    LAB RESULTS:  No results found for: "CBC"  Lab Results   Component Value " Date    LABPROT 12.1 02/26/2024    INR 1.1 02/26/2024     Lab Results   Component Value Date     (L) 05/06/2024    K 5.0 05/06/2024    CL 97 05/06/2024    CO2 24 05/06/2024    GLU 94 05/06/2024    BUN 43 (H) 05/06/2024    CREATININE 4.9 (H) 05/06/2024    CALCIUM 10.1 05/06/2024    ANIONGAP 11 05/06/2024    EGFRNONAA 9.0 (A) 02/23/2022     Lab Results   Component Value Date    WBC 5.95 05/06/2024    RBC 3.43 (L) 05/06/2024    HGB 10.4 (L) 05/06/2024    HCT 31.9 (L) 05/06/2024    MCV 93 05/06/2024    MCH 30.3 05/06/2024    MCHC 32.6 05/06/2024    RDW 15.9 (H) 05/06/2024     05/06/2024    MPV 10.5 05/06/2024    GRAN 4.4 05/06/2024    GRAN 73.3 (H) 05/06/2024    LYMPH 0.8 (L) 05/06/2024    LYMPH 12.8 (L) 05/06/2024    MONO 0.7 05/06/2024    MONO 11.1 05/06/2024    EOS 0.1 05/06/2024    BASO 0.03 05/06/2024    EOSINOPHIL 1.8 05/06/2024    BASOPHIL 0.5 05/06/2024    DIFFMETHOD Automated 05/06/2024     .  Lab Results   Component Value Date    HGBA1C 4.1 05/06/2024       IMAGING:  All pertinent imaging has been reviewed and interpreted independently.    Adequate vein BUE 2022    Left upper extremity dialysis ultrasound shows no hemodynamically significant stenosis.  Flow is adequate.      HD US 04/24/24:   Left upper extremity dialysis ultrasound shows central AVF hemodynamically significant stenosis.  Flow is 1848 ml/min.         IMP/PLAN:  59 y.o. male with   Patient Active Problem List   Diagnosis    Type 2 diabetes mellitus with stage 5 chronic kidney disease    HTN (hypertension)    Hyperlipidemia, acquired    ED (erectile dysfunction)    History of diabetic ulcer of foot    Osteomyelitis of second toe of left foot    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    Long term (current) use of antibiotics    Acute osteomyelitis of metatarsal bone of left foot    Diabetic ulcer of toe of left foot    Hyponatremia    Osteomyelitis of  left foot    Hypoalbuminemia    Healed ulcer of left foot on examination    Iron deficiency anemia    Metabolic acidosis    Anemia due to chronic kidney disease, on chronic dialysis    Diabetic ulcer of left midfoot associated with type 2 diabetes mellitus, with fat layer exposed    Type II diabetes mellitus with neurological manifestations    Foot ulcer, right, with fat layer exposed    Foot ulceration, left, with fat layer exposed    ESRD on hemodialysis    Malignant renovascular hypertension    Calcified granuloma of lung    Abnormal findings on diagnostic imaging of lung    Tracheobronchomalacia    Intractable hiccups    Serum total bilirubin elevated    Lower extremity weakness    Abnormal MRI, lumbar spine    Goals of care, counseling/discussion    DJD (degenerative joint disease)    Complete lesion of L1 level of lumbar spinal cord    Debility    Diffuse lymphadenopathy    Lytic bone lesions on xray    Hepatic lesion    Splenic lesion    Moderate protein-calorie malnutrition    GI bleed    Acute blood loss anemia    Hypokalemia    Hypertensive urgency    being managed by PCP and specialists who is here today for evaluation of long term HD access.    -s/p LUE AVF superficialization 3/14/24, HD US WNL flow 1848 ml/min.  -ok to use LUE AVF per protocol, RTC in 3 weeks for possible catheter removal.    I spent 30 minutes evaluating this patient and greater than 50% of the time was spent counseling, coordinator care and discussing the plan of care.  All questions were answered and patient stated understanding with agreement with the above treatment plan.    Donnell Richards NP  Vascular and Endovascular Surgery

## 2024-05-16 ENCOUNTER — OFFICE VISIT (OUTPATIENT)
Dept: GASTROENTEROLOGY | Facility: CLINIC | Age: 59
End: 2024-05-16
Payer: COMMERCIAL

## 2024-05-16 VITALS
HEIGHT: 74 IN | BODY MASS INDEX: 24.84 KG/M2 | WEIGHT: 193.56 LBS | DIASTOLIC BLOOD PRESSURE: 91 MMHG | HEART RATE: 81 BPM | SYSTOLIC BLOOD PRESSURE: 204 MMHG

## 2024-05-16 DIAGNOSIS — R06.6 CHRONIC HICCUPS: Primary | ICD-10-CM

## 2024-05-16 PROCEDURE — 3080F DIAST BP >= 90 MM HG: CPT | Mod: CPTII,S$GLB,, | Performed by: INTERNAL MEDICINE

## 2024-05-16 PROCEDURE — 99999 PR PBB SHADOW E&M-EST. PATIENT-LVL III: CPT | Mod: PBBFAC,,, | Performed by: INTERNAL MEDICINE

## 2024-05-16 PROCEDURE — 3077F SYST BP >= 140 MM HG: CPT | Mod: CPTII,S$GLB,, | Performed by: INTERNAL MEDICINE

## 2024-05-16 PROCEDURE — 3044F HG A1C LEVEL LT 7.0%: CPT | Mod: CPTII,S$GLB,, | Performed by: INTERNAL MEDICINE

## 2024-05-16 PROCEDURE — 3066F NEPHROPATHY DOC TX: CPT | Mod: CPTII,S$GLB,, | Performed by: INTERNAL MEDICINE

## 2024-05-16 PROCEDURE — 1159F MED LIST DOCD IN RCRD: CPT | Mod: CPTII,S$GLB,, | Performed by: INTERNAL MEDICINE

## 2024-05-16 PROCEDURE — 99212 OFFICE O/P EST SF 10 MIN: CPT | Mod: S$GLB,,, | Performed by: INTERNAL MEDICINE

## 2024-05-16 PROCEDURE — 3008F BODY MASS INDEX DOCD: CPT | Mod: CPTII,S$GLB,, | Performed by: INTERNAL MEDICINE

## 2024-05-16 PROCEDURE — 1160F RVW MEDS BY RX/DR IN RCRD: CPT | Mod: CPTII,S$GLB,, | Performed by: INTERNAL MEDICINE

## 2024-05-16 PROCEDURE — 1111F DSCHRG MED/CURRENT MED MERGE: CPT | Mod: CPTII,S$GLB,, | Performed by: INTERNAL MEDICINE

## 2024-05-16 NOTE — PROGRESS NOTES
"[facility]    CC: hiccups    HPI 59 y.o. malewith ESRD on HD, DM2, Gastroparesis, imaging concerning for metastatic disease who presents for follow up of chronic, daily, bothersome hiccups with associated dry heaves.  He has tried and failed Protonix, omeprazole, Nexium, Zofran, Phenergan, baclofen, gabapentin, reglan, thorazine, Imipramine at bedtime, bethanechol 40 mg before meals t.I.d. (for gastroparesis - caused drooling), TCA with elavil 10 mg (for gastroparesis), various home remedies, gastroparesis diet and diaphragmatic breathing.     His hiccups persist.  He is not interested in re trying any of the previous medications.    Once he falls asleep he is able to sleep without hiccups.  His hiccups do keep him from falling asleep though.    Sprite and milk sometimes help for short periods of time.    Past Medical History  Past Medical History:   Diagnosis Date    Cancer     Diabetes mellitus, type 2     Diabetic foot ulcer associated with diabetes mellitus due to underlying condition 07/16/2020    ESRD on hemodialysis     Hyperlipidemia     Hypertension     Obese        Physical Examination  BP (!) 204/91   Pulse 81   Ht 6' 1.5" (1.867 m)   Wt 87.8 kg (193 lb 9 oz)   BMI 25.19 kg/m²   General appearance: alert, cooperative, no distress  HENT: Normocephalic, atraumatic, neck symmetrical, no nasal discharge   Abdomen: soft, non-tender; non distended, no rebound or guarding  Neurologic: Alert and oriented X 3, moving all four extremities, intact sensation to light touch    Labs:  Lab Results   Component Value Date    WBC 5.95 05/06/2024    HGB 10.4 (L) 05/06/2024    HCT 31.9 (L) 05/06/2024    MCV 93 05/06/2024     05/06/2024         CMP  Sodium   Date Value Ref Range Status   05/06/2024 132 (L) 136 - 145 mmol/L Final     Potassium   Date Value Ref Range Status   05/06/2024 5.0 3.5 - 5.1 mmol/L Final     Chloride   Date Value Ref Range Status   05/06/2024 97 95 - 110 mmol/L Final "     CO2   Date Value Ref Range Status   05/06/2024 24 23 - 29 mmol/L Final     Glucose   Date Value Ref Range Status   05/06/2024 94 70 - 110 mg/dL Final     BUN   Date Value Ref Range Status   05/06/2024 43 (H) 6 - 20 mg/dL Final     Creatinine   Date Value Ref Range Status   05/06/2024 4.9 (H) 0.5 - 1.4 mg/dL Final     Calcium   Date Value Ref Range Status   05/06/2024 10.1 8.7 - 10.5 mg/dL Final     Total Protein   Date Value Ref Range Status   05/06/2024 7.3 6.0 - 8.4 g/dL Final     Albumin   Date Value Ref Range Status   05/06/2024 2.7 (L) 3.5 - 5.2 g/dL Final     Total Bilirubin   Date Value Ref Range Status   05/06/2024 1.2 (H) 0.1 - 1.0 mg/dL Final     Comment:     For infants and newborns, interpretation of results should be based  on gestational age, weight and in agreement with clinical  observations.    Premature Infant recommended reference ranges:  Up to 24 hours.............<8.0 mg/dL  Up to 48 hours............<12.0 mg/dL  3-5 days..................<15.0 mg/dL  6-29 days.................<15.0 mg/dL       Alkaline Phosphatase   Date Value Ref Range Status   05/06/2024 693 (H) 55 - 135 U/L Final     AST   Date Value Ref Range Status   05/06/2024 59 (H) 10 - 40 U/L Final     ALT   Date Value Ref Range Status   05/06/2024 37 10 - 44 U/L Final     Anion Gap   Date Value Ref Range Status   05/06/2024 11 8 - 16 mmol/L Final     eGFR   Date Value Ref Range Status   05/06/2024 12.8 (A) >60 mL/min/1.73 m^2 Final         Assessment:   The patient is a 58 yo man with imaging concerning for metastatic disease followed by heme onc but no underlying malignancy has been found, he is now being referred to ID, ESRD on HD who presents with medically refractable hiccups.  He was hospitalized for a post polypectomy bleed in April and has had no further bleeding.    Plan:  -The patient requests another opinion from another GI in the department.  I will arrange for this.  -I have messaged both pain management and IR to  inquire about a phrenic nerve block.  I will let the patient know when I hear back.    Roopa Pérez MD

## 2024-05-17 ENCOUNTER — OFFICE VISIT (OUTPATIENT)
Dept: HEMATOLOGY/ONCOLOGY | Facility: CLINIC | Age: 59
End: 2024-05-17
Payer: COMMERCIAL

## 2024-05-17 VITALS
WEIGHT: 196.63 LBS | HEIGHT: 73 IN | TEMPERATURE: 97 F | SYSTOLIC BLOOD PRESSURE: 125 MMHG | OXYGEN SATURATION: 98 % | RESPIRATION RATE: 17 BRPM | DIASTOLIC BLOOD PRESSURE: 63 MMHG | BODY MASS INDEX: 26.06 KG/M2 | HEART RATE: 88 BPM

## 2024-05-17 DIAGNOSIS — R93.7 ABNORMAL MRI, LUMBAR SPINE: ICD-10-CM

## 2024-05-17 DIAGNOSIS — N18.5 TYPE 2 DIABETES MELLITUS WITH STAGE 5 CHRONIC KIDNEY DISEASE: ICD-10-CM

## 2024-05-17 DIAGNOSIS — S34.111D: ICD-10-CM

## 2024-05-17 DIAGNOSIS — D73.89 SPLENIC LESION: ICD-10-CM

## 2024-05-17 DIAGNOSIS — E11.22 TYPE 2 DIABETES MELLITUS WITH STAGE 5 CHRONIC KIDNEY DISEASE: ICD-10-CM

## 2024-05-17 DIAGNOSIS — R59.1 DIFFUSE LYMPHADENOPATHY: Primary | ICD-10-CM

## 2024-05-17 DIAGNOSIS — I10 PRIMARY HYPERTENSION: ICD-10-CM

## 2024-05-17 DIAGNOSIS — N18.6 ESRD ON HEMODIALYSIS: ICD-10-CM

## 2024-05-17 DIAGNOSIS — Z99.2 ESRD ON HEMODIALYSIS: ICD-10-CM

## 2024-05-17 DIAGNOSIS — R06.6 INTRACTABLE HICCUPS: ICD-10-CM

## 2024-05-17 PROCEDURE — 1159F MED LIST DOCD IN RCRD: CPT | Mod: CPTII,S$GLB,, | Performed by: INTERNAL MEDICINE

## 2024-05-17 PROCEDURE — 3066F NEPHROPATHY DOC TX: CPT | Mod: CPTII,S$GLB,, | Performed by: INTERNAL MEDICINE

## 2024-05-17 PROCEDURE — 3078F DIAST BP <80 MM HG: CPT | Mod: CPTII,S$GLB,, | Performed by: INTERNAL MEDICINE

## 2024-05-17 PROCEDURE — 3008F BODY MASS INDEX DOCD: CPT | Mod: CPTII,S$GLB,, | Performed by: INTERNAL MEDICINE

## 2024-05-17 PROCEDURE — 1160F RVW MEDS BY RX/DR IN RCRD: CPT | Mod: CPTII,S$GLB,, | Performed by: INTERNAL MEDICINE

## 2024-05-17 PROCEDURE — 3044F HG A1C LEVEL LT 7.0%: CPT | Mod: CPTII,S$GLB,, | Performed by: INTERNAL MEDICINE

## 2024-05-17 PROCEDURE — 99999 PR PBB SHADOW E&M-EST. PATIENT-LVL IV: CPT | Mod: PBBFAC,,, | Performed by: INTERNAL MEDICINE

## 2024-05-17 PROCEDURE — 99215 OFFICE O/P EST HI 40 MIN: CPT | Mod: S$GLB,,, | Performed by: INTERNAL MEDICINE

## 2024-05-17 PROCEDURE — 1111F DSCHRG MED/CURRENT MED MERGE: CPT | Mod: CPTII,S$GLB,, | Performed by: INTERNAL MEDICINE

## 2024-05-17 PROCEDURE — 3074F SYST BP LT 130 MM HG: CPT | Mod: CPTII,S$GLB,, | Performed by: INTERNAL MEDICINE

## 2024-05-17 NOTE — Clinical Note
Tripp Banks, Mr. Mcfadden doesn't have a cancer diagnosis but also doesn't have a PCP. I was wondering if you could reach out to him to assist him w/ financial assistance or if you could relay this message to someone who could help him? Please and thanks!

## 2024-05-17 NOTE — Clinical Note
Hi Dr. Pérez, I noticed Mr. Mcfadden has the 1.5 cm polyp in situ - wanted to plug him back in with your team to get that addressed. Thanks!

## 2024-05-17 NOTE — Clinical Note
Hi team, Mr. Mcfadden is open to rescheduling the bone marrow biopsy last I spoke with him. I was hoping juan r could fit him in next available. Will you let me know when scheduled so I can set up a follow up visit Thanks!

## 2024-05-17 NOTE — Clinical Note
Tripp Bravo, My name is Brianna Mendoza, one of the med oncs. You are scheduled to meet Mr. Mcfadden in July for progressive BLE weakness of unclear etiology. I have been following him in onc clinic for  a > one year history of unintentional weight loss, with recent imaging including PET in Jan 2024 revealing widespread hypermetabolic adenopathy, innumerable foci within the liver, splenic lesions, bone lesions, uptake in the transverse colon, and diffuse pulmonary micro-nodules (too small to characterize on PET). He has undergone a left iliac bone lesion biopsy on 2/29/24 and bronchoscopy/EBUS on 3/1/24 with no overt evidence of malignancy; so cause of imaging findings is currently unclear.  I was hoping your team could fit him in more quickly. Thanks!
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
## 2024-05-22 ENCOUNTER — HOSPITAL ENCOUNTER (EMERGENCY)
Facility: HOSPITAL | Age: 59
Discharge: HOME | End: 2024-05-22
Attending: EMERGENCY MEDICINE
Payer: COMMERCIAL

## 2024-05-22 ENCOUNTER — TELEPHONE (OUTPATIENT)
Dept: VASCULAR SURGERY | Facility: CLINIC | Age: 59
End: 2024-05-22
Payer: COMMERCIAL

## 2024-05-22 ENCOUNTER — OFFICE VISIT (OUTPATIENT)
Dept: VASCULAR SURGERY | Facility: CLINIC | Age: 59
End: 2024-05-22
Payer: COMMERCIAL

## 2024-05-22 ENCOUNTER — TELEPHONE (OUTPATIENT)
Dept: VASCULAR SURGERY | Facility: CLINIC | Age: 59
End: 2024-05-22

## 2024-05-22 VITALS
HEART RATE: 82 BPM | RESPIRATION RATE: 16 BRPM | TEMPERATURE: 98 F | SYSTOLIC BLOOD PRESSURE: 161 MMHG | WEIGHT: 189 LBS | DIASTOLIC BLOOD PRESSURE: 80 MMHG | BODY MASS INDEX: 24.94 KG/M2 | OXYGEN SATURATION: 98 %

## 2024-05-22 VITALS
SYSTOLIC BLOOD PRESSURE: 153 MMHG | HEIGHT: 73 IN | BODY MASS INDEX: 26.38 KG/M2 | HEART RATE: 86 BPM | DIASTOLIC BLOOD PRESSURE: 76 MMHG | WEIGHT: 199.06 LBS

## 2024-05-22 DIAGNOSIS — N18.6 ESRD (END STAGE RENAL DISEASE): ICD-10-CM

## 2024-05-22 DIAGNOSIS — Z99.2 ESRD ON HEMODIALYSIS: Primary | ICD-10-CM

## 2024-05-22 DIAGNOSIS — T82.858A ARTERIOVENOUS FISTULA STENOSIS, INITIAL ENCOUNTER: ICD-10-CM

## 2024-05-22 DIAGNOSIS — T82.858A ARTERIOVENOUS FISTULA STENOSIS, INITIAL ENCOUNTER: Primary | ICD-10-CM

## 2024-05-22 DIAGNOSIS — E87.5 HYPERKALEMIA: Primary | ICD-10-CM

## 2024-05-22 DIAGNOSIS — N18.6 ESRD ON HEMODIALYSIS: ICD-10-CM

## 2024-05-22 DIAGNOSIS — N18.6 ESRD ON HEMODIALYSIS: Primary | ICD-10-CM

## 2024-05-22 DIAGNOSIS — Z99.2 ESRD ON HEMODIALYSIS: ICD-10-CM

## 2024-05-22 LAB
ALBUMIN SERPL BCP-MCNC: 2.8 G/DL (ref 3.5–5.2)
ALP SERPL-CCNC: 798 U/L (ref 55–135)
ALT SERPL W/O P-5'-P-CCNC: 31 U/L (ref 10–44)
ANION GAP SERPL CALC-SCNC: 9 MMOL/L (ref 8–16)
AST SERPL-CCNC: 51 U/L (ref 10–40)
BASOPHILS # BLD AUTO: 0.04 K/UL (ref 0–0.2)
BASOPHILS NFR BLD: 0.9 % (ref 0–1.9)
BILIRUB SERPL-MCNC: 1.2 MG/DL (ref 0.1–1)
BUN SERPL-MCNC: 38 MG/DL (ref 6–20)
CALCIUM SERPL-MCNC: 10 MG/DL (ref 8.7–10.5)
CHLORIDE SERPL-SCNC: 96 MMOL/L (ref 95–110)
CO2 SERPL-SCNC: 26 MMOL/L (ref 23–29)
CREAT SERPL-MCNC: 4.9 MG/DL (ref 0.5–1.4)
DIFFERENTIAL METHOD BLD: ABNORMAL
EOSINOPHIL # BLD AUTO: 0.1 K/UL (ref 0–0.5)
EOSINOPHIL NFR BLD: 1.5 % (ref 0–8)
ERYTHROCYTE [DISTWIDTH] IN BLOOD BY AUTOMATED COUNT: 14.2 % (ref 11.5–14.5)
EST. GFR  (NO RACE VARIABLE): 13 ML/MIN/1.73 M^2
GLUCOSE SERPL-MCNC: 79 MG/DL (ref 70–110)
HCT VFR BLD AUTO: 33 % (ref 40–54)
HGB BLD-MCNC: 10.8 G/DL (ref 14–18)
IMM GRANULOCYTES # BLD AUTO: 0.01 K/UL (ref 0–0.04)
IMM GRANULOCYTES NFR BLD AUTO: 0.2 % (ref 0–0.5)
LYMPHOCYTES # BLD AUTO: 0.6 K/UL (ref 1–4.8)
LYMPHOCYTES NFR BLD: 12.4 % (ref 18–48)
MAGNESIUM SERPL-MCNC: 1.9 MG/DL (ref 1.6–2.6)
MCH RBC QN AUTO: 29 PG (ref 27–31)
MCHC RBC AUTO-ENTMCNC: 32.7 G/DL (ref 32–36)
MCV RBC AUTO: 89 FL (ref 82–98)
MONOCYTES # BLD AUTO: 0.6 K/UL (ref 0.3–1)
MONOCYTES NFR BLD: 12.4 % (ref 4–15)
NEUTROPHILS # BLD AUTO: 3.3 K/UL (ref 1.8–7.7)
NEUTROPHILS NFR BLD: 72.6 % (ref 38–73)
NRBC BLD-RTO: 0 /100 WBC
PHOSPHATE SERPL-MCNC: 4 MG/DL (ref 2.7–4.5)
PLATELET # BLD AUTO: 134 K/UL (ref 150–450)
PMV BLD AUTO: 9.2 FL (ref 9.2–12.9)
POTASSIUM SERPL-SCNC: 5.4 MMOL/L (ref 3.5–5.1)
PROT SERPL-MCNC: 7.6 G/DL (ref 6–8.4)
RBC # BLD AUTO: 3.73 M/UL (ref 4.6–6.2)
SODIUM SERPL-SCNC: 131 MMOL/L (ref 136–145)
WBC # BLD AUTO: 4.6 K/UL (ref 3.9–12.7)

## 2024-05-22 PROCEDURE — 99214 OFFICE O/P EST MOD 30 MIN: CPT | Mod: S$GLB,,, | Performed by: SURGERY

## 2024-05-22 PROCEDURE — 25000003 PHARM REV CODE 250: Performed by: EMERGENCY MEDICINE

## 2024-05-22 PROCEDURE — 3078F DIAST BP <80 MM HG: CPT | Mod: CPTII,S$GLB,, | Performed by: SURGERY

## 2024-05-22 PROCEDURE — 85025 COMPLETE CBC W/AUTO DIFF WBC: CPT | Performed by: EMERGENCY MEDICINE

## 2024-05-22 PROCEDURE — 3066F NEPHROPATHY DOC TX: CPT | Mod: CPTII,S$GLB,, | Performed by: SURGERY

## 2024-05-22 PROCEDURE — 99284 EMERGENCY DEPT VISIT MOD MDM: CPT

## 2024-05-22 PROCEDURE — 84100 ASSAY OF PHOSPHORUS: CPT | Performed by: EMERGENCY MEDICINE

## 2024-05-22 PROCEDURE — 99999 PR PBB SHADOW E&M-EST. PATIENT-LVL III: CPT | Mod: PBBFAC,,, | Performed by: SURGERY

## 2024-05-22 PROCEDURE — 83735 ASSAY OF MAGNESIUM: CPT | Performed by: EMERGENCY MEDICINE

## 2024-05-22 PROCEDURE — 3044F HG A1C LEVEL LT 7.0%: CPT | Mod: CPTII,S$GLB,, | Performed by: SURGERY

## 2024-05-22 PROCEDURE — 3008F BODY MASS INDEX DOCD: CPT | Mod: CPTII,S$GLB,, | Performed by: SURGERY

## 2024-05-22 PROCEDURE — 3077F SYST BP >= 140 MM HG: CPT | Mod: CPTII,S$GLB,, | Performed by: SURGERY

## 2024-05-22 PROCEDURE — 80053 COMPREHEN METABOLIC PANEL: CPT | Performed by: EMERGENCY MEDICINE

## 2024-05-22 RX ORDER — CEFAZOLIN SODIUM 2 G/50ML
2 SOLUTION INTRAVENOUS ONCE
Status: CANCELLED | OUTPATIENT
Start: 2024-05-22 | End: 2024-05-22

## 2024-05-22 RX ADMIN — SODIUM ZIRCONIUM CYCLOSILICATE 10 G: 10 POWDER, FOR SUSPENSION ORAL at 01:05

## 2024-05-22 NOTE — H&P (VIEW-ONLY)
Daniel Poon MD [facility]                         05/22/2024    HPI:  Catalino Mcfadden is a 59 y.o. male with   Patient Active Problem List   Diagnosis    Type 2 diabetes mellitus with stage 5 chronic kidney disease    HTN (hypertension)    Hyperlipidemia, acquired    ED (erectile dysfunction)    History of diabetic ulcer of foot    Osteomyelitis of second toe of left foot    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    Long term (current) use of antibiotics    Acute osteomyelitis of metatarsal bone of left foot    Diabetic ulcer of toe of left foot    Hyponatremia    Osteomyelitis of left foot    Hypoalbuminemia    Healed ulcer of left foot on examination    Iron deficiency anemia    Metabolic acidosis    Anemia due to chronic kidney disease, on chronic dialysis    Diabetic ulcer of left midfoot associated with type 2 diabetes mellitus, with fat layer exposed    Type II diabetes mellitus with neurological manifestations    Foot ulcer, right, with fat layer exposed    Foot ulceration, left, with fat layer exposed    ESRD on hemodialysis    Malignant renovascular hypertension    Calcified granuloma of lung    Abnormal findings on diagnostic imaging of lung    Tracheobronchomalacia    Intractable hiccups    Serum total bilirubin elevated    Lower extremity weakness    Abnormal MRI, lumbar spine    Goals of care, counseling/discussion    DJD (degenerative joint disease)    Complete lesion of L1 level of lumbar spinal cord    Debility    Diffuse lymphadenopathy    Lytic bone lesions on xray    Hepatic lesion    Splenic lesion    Moderate protein-calorie malnutrition    GI bleed    Acute blood loss anemia    Hypokalemia    Hypertensive urgency    being managed by PCP and specialists who is in need for long term dialysis access.  Patient is R handed.  Has been on HD for months.  No previous  access surgery.  No defibrillator or pacemaker.  No recent UTI.    No extremity pain and swelling.     no MI  no Stroke  yes DM  yes HTN  no Lupus  no nephropathy  Tobacco use: denies    6/2023: Presents for f/u.  No new issues.  L foot wound has healed.    8/2023:  s/p LUE BC AVF 7/6/23.  Recovered well.  No HD US today.    10/2023:  began using AVF one needle x 1 successfully    10/2023:  DIFFICULTY WITH ACCESS.    2/2024:  unable to make surgery 11/2023.  Using cath for dialysis, occasional AVF.    5/2024:  high pressures at HD, using AVF 2 weeks    Past Medical History:   Diagnosis Date    Cancer     Diabetes mellitus, type 2     Diabetic foot ulcer associated with diabetes mellitus due to underlying condition 07/16/2020    ESRD on hemodialysis     Hyperlipidemia     Hypertension     Obese      Past Surgical History:   Procedure Laterality Date    AV FISTULA PLACEMENT Left 07/06/2023    Procedure: CREATION, AV FISTULA;  Surgeon: Daniel Poon MD;  Location: [facility] OR;  Service: Vascular;  Laterality: Left;  RN PREOP 6/28/2023  LABS DAY OF SURGERY    BONE BIOPSY Left 07/17/2020    Procedure: BIOPSY, BONE;  Surgeon: Verona Whitmore DPM;  Location: [facility] OR;  Service: Podiatry;  Laterality: Left;    CERVICAL DISC SURGERY  07/11/2016    COLONOSCOPY N/A 3/25/2024    Procedure: COLONOSCOPY;  Surgeon: Roopa Pérez MD;  Location: [facility] (4TH FLR);  Service: Endoscopy;  Laterality: N/A;  Ref By: Dr. Pérez   constipation prep  Diaylsis Patient Lab has been ordered  3/20-precall complete-MS    COLONOSCOPY N/A 4/8/2024    Procedure: COLONOSCOPY;  Surgeon: Roopa Pérez MD;  Location: [facility] (2ND FLR);  Service: Endoscopy;  Laterality: N/A;  Prep instructions sent via portal/given to pt in clinic  pt had to be r/s at  request  Dialysis Irene Arguello,Sat-dw  Peg Prep-dw  4/2/24- Labs - dialysis /poor prep on 3/26 - PEG x 2 - extended prep /LVM for precall - ERW  4/5-LVM for precall-KPvt     COLONOSCOPY N/A 4/16/2024    Procedure: COLONOSCOPY;  Surgeon: Jarett Hill MD;  Location: [facility] (2ND FLR);  Service: Endoscopy;  Laterality: N/A;    DEBRIDEMENT Left 07/17/2020    Procedure: DEBRIDEMENT;  Surgeon: Verona Whitmore DPM;  Location: [facility] OR;  Service: Podiatry;  Laterality: Left;    DEBRIDEMENT OF FOOT Right     toes & plantar surface    ENDOBRONCHIAL ULTRASOUND N/A 3/1/2024    Procedure: ENDOBRONCHIAL ULTRASOUND (EBUS);  Surgeon: Francisco Fleming MD;  Location: [facility] 2ND FLR;  Service: Pulmonary;  Laterality: N/A;    ESOPHAGEAL MANOMETRY WITH MEASUREMENT OF IMPEDANCE N/A 03/27/2023    Procedure: MANOMETRY, ESOPHAGUS, WITH IMPEDANCE MEASUREMENT;  Surgeon: Roopa Pérez MD;  Location: [facility] (4TH FLR);  Service: Endoscopy;  Laterality: N/A;  Belching and rumination protocol please  instructions via portal - sm    ESOPHAGOGASTRODUODENOSCOPY N/A 12/16/2022    Procedure: EGD (ESOPHAGOGASTRODUODENOSCOPY);  Surgeon: Eren Francois MD;  Location: [facility];  Service: Endoscopy;  Laterality: N/A;  Pt. on Dialysis. K+ ordered prior to procedure.EC    ESOPHAGOGASTRODUODENOSCOPY N/A 12/5/2023    Procedure: EGD (ESOPHAGOGASTRODUODENOSCOPY);  Surgeon: Kash Johnston MD;  Location: [facility];  Service: Endoscopy;  Laterality: N/A;    FRACTURE SURGERY Right     medial ankle, metal plate present    INJECTION OF ANESTHETIC AGENT AROUND NERVE Right 2/2/2024    Procedure: BLOCK, NERVE STELLATE GANGLION *HOLD ASPIRIN 5 DAYS PRIOR*;  Surgeon: Gokul Gonzalez MD;  Location: [facility] PAIN MGT;  Service: Pain Management;  Laterality: Right;  [phone]    INSERTION OF TUNNELED CENTRAL VENOUS CATHETER (CVC) WITH SUBCUTANEOUS PORT N/A 06/11/2021    Procedure: TUNNEL CATH INSERTION WITHOUT PORT;  Surgeon: Jose Manuel Diagnostic Provider;  Location: [facility] OR;  Service: Radiology;  Laterality: N/A;  11AM START---PHONE PREOP 6/10/21---COVID POSTIVE ON 7/2020---NO S/S    left leg orthopedic surgery Left      REVISION OF ARTERIOVENOUS FISTULA Left 3/14/2024    Procedure: REVISION, AV FISTULA;  Surgeon: Daniel Poon MD;  Location: [facility] OR;  Service: Vascular;  Laterality: Left;  RN preop 3/6/2024----NEED ORDERS    UPPER GASTROINTESTINAL ENDOSCOPY       Family History   Adopted: Yes   Problem Relation Name Age of Onset    Heart disease Father      Colon cancer Neg Hx      Esophageal cancer Neg Hx      Colon polyps Neg Hx      Stomach cancer Neg Hx       Social History     Socioeconomic History    Marital status: Other    Number of children: 1   Tobacco Use    Smoking status: Never    Smokeless tobacco: Never   Substance and Sexual Activity    Alcohol use: Not Currently     Comment: occ rare beer    Drug use: No    Sexual activity: Not Currently   Social History Narrative    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     Social Determinants of Health     Financial Resource Strain: Medium Risk (4/16/2024)    Overall Financial Resource Strain (CARDIA)     Difficulty of Paying Living Expenses: Somewhat hard   Food Insecurity: No Food Insecurity (4/16/2024)    Hunger Vital Sign     Worried About Running Out of Food in the Last Year: Never true     Ran Out of Food in the Last Year: Never true   Transportation Needs: No Transportation Needs (4/16/2024)    PRAPARE - Transportation     Lack of Transportation (Medical): No     Lack of Transportation (Non-Medical): No   Physical Activity: Inactive (4/16/2024)    Exercise Vital Sign     Days of Exercise per Week: 0 days     Minutes of Exercise per Session: 0 min   Stress: Stress Concern Present (4/16/2024)    [employer] - Occupational Stress Questionnaire     Feeling of Stress : To some extent   Housing Stability: Low Risk  (4/16/2024)    Housing Stability Vital Sign     Unable to Pay for Housing in the Last Year: No     Homeless in the Last Year: No       Current Outpatient Medications:     hydrALAZINE (APRESOLINE) 100 MG tablet, Take 1 tablet (100 mg total) by  "mouth 3 (three) times daily., Disp: 270 tablet, Rfl: 3    NIFEdipine (PROCARDIA XL) 90 MG (OSM) 24 hr tablet, Take 1 tablet (90 mg total) by mouth once daily., Disp: 90 tablet, Rfl: 3    REVIEW OF SYSTEMS:  General: No fevers or chills; ENT: No sore throat; Allergy and Immunology: no persistent infections; Hematological and Lymphatic: No history of bleeding or easy bruising; Endocrine: negative; Respiratory: no cough, shortness of breath, or wheezing; Cardiovascular: no chest pain or dyspnea on exertion; Gastrointestinal: no abdominal pain/back, change in bowel habits, or bloody stools; Genito-Urinary: no dysuria, trouble voiding, or hematuria; Musculoskeletal: negative; Neurological: no TIA or stroke symptoms; Psychiatric: no nervousness, anxiety or depression.    PHYSICAL EXAM:      Pulse: 86         General appearance:  Alert, well-appearing, and in no distress.  Oriented to person, place, and time                    Neurological: Normal speech, no focal findings noted; CN II - XII grossly intact. BUE with sensation to light touch.            Musculoskeletal: Digits/nail without cyanosis/clubbing.  Strength 5/5 BUE.                    Neck: Supple, no significant adenopathy, no carotid bruit can be auscultated                  Chest:  Clear to auscultation, no wheezes, rales or rhonchi, symmetric air entry. No use of accessory muscles               Cardiac: Normal rate and regular rhythm, S1 and S2 normal            Abdomen: Soft, nontender, nondistended, no masses or organomegaly, no hernia     No rebound tenderness noted; bowel sounds normal     Pulsatile aortic mass is non palpable.     No groin adenopathy      Extremities:   2 + R radial pulse, 2 + L radial pulse     2 + R brachial pulse, 2 + L brachial pulse     no RUE edema, no LUE edema     neg Dutch's test RUE, neg Dutch's test LUE, +thrill LUE AVF, inc well healed    Skin: No tissue loss    LAB RESULTS:  No results found for: "CBC"  Lab Results "   Component Value Date    LABPROT 12.1 02/26/2024    INR 1.1 02/26/2024     Lab Results   Component Value Date     (L) 05/06/2024    K 5.0 05/06/2024    CL 97 05/06/2024    CO2 24 05/06/2024    GLU 94 05/06/2024    BUN 43 (H) 05/06/2024    CREATININE 4.9 (H) 05/06/2024    CALCIUM 10.1 05/06/2024    ANIONGAP 11 05/06/2024    EGFRNONAA 9.0 (A) 02/23/2022     Lab Results   Component Value Date    WBC 5.95 05/06/2024    RBC 3.43 (L) 05/06/2024    HGB 10.4 (L) 05/06/2024    HCT 31.9 (L) 05/06/2024    MCV 93 05/06/2024    MCH 30.3 05/06/2024    MCHC 32.6 05/06/2024    RDW 15.9 (H) 05/06/2024     05/06/2024    MPV 10.5 05/06/2024    GRAN 4.4 05/06/2024    GRAN 73.3 (H) 05/06/2024    LYMPH 0.8 (L) 05/06/2024    LYMPH 12.8 (L) 05/06/2024    MONO 0.7 05/06/2024    MONO 11.1 05/06/2024    EOS 0.1 05/06/2024    BASO 0.03 05/06/2024    EOSINOPHIL 1.8 05/06/2024    BASOPHIL 0.5 05/06/2024    DIFFMETHOD Automated 05/06/2024     .  Lab Results   Component Value Date    HGBA1C 4.1 05/06/2024       IMAGING:  All pertinent imaging has been reviewed and interpreted independently.    Adequate vein BUE 2022    Left upper extremity dialysis ultrasound shows no hemodynamically significant stenosis.  Flow is adequate.        IMP/PLAN:  59 y.o. male with   Patient Active Problem List   Diagnosis    Type 2 diabetes mellitus with stage 5 chronic kidney disease    HTN (hypertension)    Hyperlipidemia, acquired    ED (erectile dysfunction)    History of diabetic ulcer of foot    Osteomyelitis of second toe of left foot    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    Long term (current) use of antibiotics    Acute osteomyelitis of metatarsal bone of left foot    Diabetic ulcer of toe of left foot    Hyponatremia    Osteomyelitis of left foot    Hypoalbuminemia    Healed ulcer of left foot on examination    Iron deficiency anemia    Metabolic acidosis     Anemia due to chronic kidney disease, on chronic dialysis    Diabetic ulcer of left midfoot associated with type 2 diabetes mellitus, with fat layer exposed    Type II diabetes mellitus with neurological manifestations    Foot ulcer, right, with fat layer exposed    Foot ulceration, left, with fat layer exposed    ESRD on hemodialysis    Malignant renovascular hypertension    Calcified granuloma of lung    Abnormal findings on diagnostic imaging of lung    Tracheobronchomalacia    Intractable hiccups    Serum total bilirubin elevated    Lower extremity weakness    Abnormal MRI, lumbar spine    Goals of care, counseling/discussion    DJD (degenerative joint disease)    Complete lesion of L1 level of lumbar spinal cord    Debility    Diffuse lymphadenopathy    Lytic bone lesions on xray    Hepatic lesion    Splenic lesion    Moderate protein-calorie malnutrition    GI bleed    Acute blood loss anemia    Hypokalemia    Hypertensive urgency    being managed by PCP and specialists who is here today for evaluation of long term HD access.    -s/p LUE AVF superficialization 3/15/24 and AVF use with high pressures - rec fistulogram 5/30/24  -use catheter for HD    I spent 12 minutes evaluating this patient and greater than 50% of the time was spent counseling, coordinator care and discussing the plan of care.  All questions were answered and patient stated understanding with agreement with the above treatment plan.    Daniel Poon MD [facility]  Vascular and Endovascular Surgery

## 2024-05-22 NOTE — ED PROVIDER NOTES
Encounter Date: 5/22/2024       History     Chief Complaint   Patient presents with    Hyperkalemia     Pt reports to ED for emergency dialysis. Pt states he had dialysis on yesterday, but his potassium was still high. Sent from clinic     Chief complaint:  Abnormal lab value     History of present illness:  59-year-old male presents emergency department for evaluation of lab values consistent with hyperkalemia.  The patient reports that he goes to dialysis Tuesday, Thursday, Saturday.  He reports that he had full dialysis yesterday.  They do routine labs.  He was called today to tell him that his potassium was elevated.  He denies any headache, chest pain, shortness of breath, abdominal pain, nausea, vomiting.      Review of patient's allergies indicates:  No Known Allergies  Past Medical History:   Diagnosis Date    Cancer     Diabetes mellitus, type 2     Diabetic foot ulcer associated with diabetes mellitus due to underlying condition 07/16/2020    ESRD on hemodialysis     Hyperlipidemia     Hypertension     Obese      Past Surgical History:   Procedure Laterality Date    AV FISTULA PLACEMENT Left 07/06/2023    Procedure: CREATION, AV FISTULA;  Surgeon: Daniel Poon MD;  Location: [facility] OR;  Service: Vascular;  Laterality: Left;  RN PREOP 6/28/2023  LABS DAY OF SURGERY    BONE BIOPSY Left 07/17/2020    Procedure: BIOPSY, BONE;  Surgeon: Verona Whitmore DPM;  Location: [facility] OR;  Service: Podiatry;  Laterality: Left;    CERVICAL DISC SURGERY  07/11/2016    COLONOSCOPY N/A 3/25/2024    Procedure: COLONOSCOPY;  Surgeon: Roopa Pérez MD;  Location: [facility] (4TH FLR);  Service: Endoscopy;  Laterality: N/A;  Ref By: Dr. Pérez   constipation prep  Diaylsis Patient Lab has been ordered  3/20-precall complete-MS    COLONOSCOPY N/A 4/8/2024    Procedure: COLONOSCOPY;  Surgeon: Roopa Pérez MD;  Location: [facility] ENDO (2ND FLR);  Service: Endoscopy;  Laterality: N/A;  Prep instructions sent via  portal/given to pt in clinic  pt had to be r/s at  request  Dialysis Tues,Thurs,Sat-dw  Peg Prep-dw  4/2/24- Labs - dialysis /poor prep on 3/26 - PEG x 2 - extended prep /LVM for precall - ERW  4/5-LVM for precall-KPvt    COLONOSCOPY N/A 4/16/2024    Procedure: COLONOSCOPY;  Surgeon: Jarett Hill MD;  Location: [facility] (2ND FLR);  Service: Endoscopy;  Laterality: N/A;    DEBRIDEMENT Left 07/17/2020    Procedure: DEBRIDEMENT;  Surgeon: Verona Whitmore DPM;  Location: [facility] OR;  Service: Podiatry;  Laterality: Left;    DEBRIDEMENT OF FOOT Right     toes & plantar surface    ENDOBRONCHIAL ULTRASOUND N/A 3/1/2024    Procedure: ENDOBRONCHIAL ULTRASOUND (EBUS);  Surgeon: Francisco Fleming MD;  Location: [facility] OR 2ND FLR;  Service: Pulmonary;  Laterality: N/A;    ESOPHAGEAL MANOMETRY WITH MEASUREMENT OF IMPEDANCE N/A 03/27/2023    Procedure: MANOMETRY, ESOPHAGUS, WITH IMPEDANCE MEASUREMENT;  Surgeon: Roopa Pérez MD;  Location: [facility] (4TH FLR);  Service: Endoscopy;  Laterality: N/A;  Belching and rumination protocol please  instructions via portal - sm    ESOPHAGOGASTRODUODENOSCOPY N/A 12/16/2022    Procedure: EGD (ESOPHAGOGASTRODUODENOSCOPY);  Surgeon: Eren Francois MD;  Location: [facility];  Service: Endoscopy;  Laterality: N/A;  Pt. on Dialysis. K+ ordered prior to procedure.EC    ESOPHAGOGASTRODUODENOSCOPY N/A 12/5/2023    Procedure: EGD (ESOPHAGOGASTRODUODENOSCOPY);  Surgeon: Kash Johnston MD;  Location: [facility];  Service: Endoscopy;  Laterality: N/A;    FRACTURE SURGERY Right     medial ankle, metal plate present    INJECTION OF ANESTHETIC AGENT AROUND NERVE Right 2/2/2024    Procedure: BLOCK, NERVE STELLATE GANGLION *HOLD ASPIRIN 5 DAYS PRIOR*;  Surgeon: Gokul Gonzalez MD;  Location: [facility] PAIN MGT;  Service: Pain Management;  Laterality: Right;  [phone]    INSERTION OF TUNNELED CENTRAL VENOUS CATHETER (CVC) WITH SUBCUTANEOUS PORT N/A 06/11/2021    Procedure: TUNNEL CATH  INSERTION WITHOUT PORT;  Surgeon: Jose Manuel Diagnostic Provider;  Location: [facility] OR;  Service: Radiology;  Laterality: N/A;  11AM START---PHONE PREOP 6/10/21---COVID POSTIVE ON 7/2020---NO S/S    left leg orthopedic surgery Left     REVISION OF ARTERIOVENOUS FISTULA Left 3/14/2024    Procedure: REVISION, AV FISTULA;  Surgeon: Daniel Poon MD;  Location: [facility] OR;  Service: Vascular;  Laterality: Left;  RN preop 3/6/2024----NEED ORDERS    UPPER GASTROINTESTINAL ENDOSCOPY       Family History   Adopted: Yes   Problem Relation Name Age of Onset    Heart disease Father      Colon cancer Neg Hx      Esophageal cancer Neg Hx      Colon polyps Neg Hx      Stomach cancer Neg Hx       Social History     Tobacco Use    Smoking status: Never    Smokeless tobacco: Never   Substance Use Topics    Alcohol use: Not Currently     Comment: occ rare beer    Drug use: No     Review of Systems   Constitutional:  Negative for fever.   HENT:  Negative for sore throat.    Respiratory:  Negative for shortness of breath.    Cardiovascular:  Negative for chest pain.   Gastrointestinal:  Negative for nausea.   Genitourinary:  Negative for dysuria.   Musculoskeletal:  Negative for back pain.   Skin:  Negative for rash.   Neurological:  Negative for weakness.   Hematological:  Does not bruise/bleed easily.   Psychiatric/Behavioral:  Negative for agitation and behavioral problems.        Physical Exam     Initial Vitals [05/22/24 1140]   BP Pulse Resp Temp SpO2   (!) 147/75 84 18 98.4 °F (36.9 °C) 99 %      MAP       --         Physical Exam    Nursing note and vitals reviewed.  Constitutional: He appears well-developed and well-nourished. He is not diaphoretic. No distress.   HENT:   Head: Normocephalic.   Right Ear: External ear normal.   Left Ear: External ear normal.   Nose: Nose normal.   Mouth/Throat: Oropharynx is clear and moist.   Eyes: Conjunctivae and EOM are normal. Pupils are equal, round, and reactive to light. Right eye  exhibits no discharge. Left eye exhibits no discharge. No scleral icterus.   Neck: Neck supple. No JVD present.   Normal range of motion.  Cardiovascular:  Normal rate, regular rhythm, normal heart sounds and intact distal pulses.     Exam reveals no gallop and no friction rub.       No murmur heard.  Pulmonary/Chest: Breath sounds normal. No stridor. No respiratory distress. He has no wheezes. He has no rhonchi. He has no rales. He exhibits no tenderness.   Abdominal: Abdomen is soft. Bowel sounds are normal. He exhibits no distension and no mass. There is no abdominal tenderness. There is no rebound and no guarding.   Musculoskeletal:         General: No tenderness or edema. Normal range of motion.      Cervical back: Normal range of motion and neck supple.     Neurological: He is alert and oriented to person, place, and time. He has normal strength. No cranial nerve deficit or sensory deficit.   Skin: Skin is warm and dry. No rash noted. No erythema. No pallor.   Psychiatric: He has a normal mood and affect. His behavior is normal. Judgment and thought content normal.         ED Course   Procedures  Labs Reviewed   CBC W/ AUTO DIFFERENTIAL - Abnormal; Notable for the following components:       Result Value    RBC 3.73 (*)     Hemoglobin 10.8 (*)     Hematocrit 33.0 (*)     Platelets 134 (*)     Lymph # 0.6 (*)     Lymph % 12.4 (*)     All other components within normal limits   COMPREHENSIVE METABOLIC PANEL - Abnormal; Notable for the following components:    Sodium 131 (*)     Potassium 5.4 (*)     BUN 38 (*)     Creatinine 4.9 (*)     Albumin 2.8 (*)     Total Bilirubin 1.2 (*)     Alkaline Phosphatase 798 (*)     AST 51 (*)     eGFR 13 (*)     All other components within normal limits   MAGNESIUM   PHOSPHORUS     EKG Readings: (Independently Interpreted)   Initial Reading: No STEMI. Ectopy: No Ectopy.   EKG reviewed and interpreted by me shows sinus rhythm with a rate of 81 beats per minute.  The AL, QRS, QTC  intervals are within normal limits there is a left axis deviation.  There is poor R-wave progression across the precordium.  There are no ST segment or T-wave ischemic findings.         Imaging Results    None          Medications   sodium zirconium cyclosilicate packet 10 g (10 g Oral Given 5/22/24 0220)     Medical Decision Making  This is the emergent evaluation of a 59-year-old male presents emergency department due to abnormal lab value.  Patient reports that he was told that he has hyperkalemia.  Differential diagnosis at the time of initial evaluation included, but was not limited to:   Laboratory area due to hemolysis, hyperkalemia, other metabolic derangement.  Patient reportedly had a potassium at dialysis of 7.2.  Today, he has no EKG concerning abnormalities such as widened QRS or peaked T-waves.  He has slight improvement in his baseline anemia.  He is not sufficiently uremic or acidemic.  He does have a potassium of 5.4 which is mildly elevated.  He is asymptomatic.  Magnesium and phosphorus within normal limits.  I discussed the case with Dr. Villatoro, the patient's nephrologist who advised giving 10 g of Lokelma in the emergency department followed by repeat labs at dialysis tomorrow.  Patient advised to return for any new or worsening symptoms such as chest pain, shortness of breath, or weakness.  Advised to go to hemodialysis as scheduled tomorrow.    Amount and/or Complexity of Data Reviewed  External Data Reviewed: notes.  Labs: ordered. Decision-making details documented in ED Course.     Details: CBC, CMP, magnesium, phosphorus  ECG/medicine tests: ordered and independent interpretation performed. Decision-making details documented in ED Course.    Risk  Prescription drug management.                                      Clinical Impression:  Final diagnoses:  [E87.5] Hyperkalemia (Primary)  [N18.6] ESRD (end stage renal disease)          ED Disposition Condition    Discharge Stable          ED  Prescriptions    None       Follow-up Information    None          Rudy Thomas Jr., MD  05/22/24 6048

## 2024-05-22 NOTE — ED NOTES
Pt presents to the ED today for possible hyperkalemia. Pt reports getting his blood done Monday and was called this morning by clinic to come into the ED. Pt is dialysis patient (tues, thurs, jose). Pt reports compliance with dialysis and home medications. Pt denies chest pain, sob, muscle cramping. Pt is alert and oriented, resting comfortably in bed, denies pain/discomfort, pt is on continuous cardiac monitor.

## 2024-05-22 NOTE — PROGRESS NOTES
Daniel Poon MD [facility]                         05/22/2024    HPI:  Catalino Mcfadden is a 59 y.o. male with   Patient Active Problem List   Diagnosis    Type 2 diabetes mellitus with stage 5 chronic kidney disease    HTN (hypertension)    Hyperlipidemia, acquired    ED (erectile dysfunction)    History of diabetic ulcer of foot    Osteomyelitis of second toe of left foot    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    Long term (current) use of antibiotics    Acute osteomyelitis of metatarsal bone of left foot    Diabetic ulcer of toe of left foot    Hyponatremia    Osteomyelitis of left foot    Hypoalbuminemia    Healed ulcer of left foot on examination    Iron deficiency anemia    Metabolic acidosis    Anemia due to chronic kidney disease, on chronic dialysis    Diabetic ulcer of left midfoot associated with type 2 diabetes mellitus, with fat layer exposed    Type II diabetes mellitus with neurological manifestations    Foot ulcer, right, with fat layer exposed    Foot ulceration, left, with fat layer exposed    ESRD on hemodialysis    Malignant renovascular hypertension    Calcified granuloma of lung    Abnormal findings on diagnostic imaging of lung    Tracheobronchomalacia    Intractable hiccups    Serum total bilirubin elevated    Lower extremity weakness    Abnormal MRI, lumbar spine    Goals of care, counseling/discussion    DJD (degenerative joint disease)    Complete lesion of L1 level of lumbar spinal cord    Debility    Diffuse lymphadenopathy    Lytic bone lesions on xray    Hepatic lesion    Splenic lesion    Moderate protein-calorie malnutrition    GI bleed    Acute blood loss anemia    Hypokalemia    Hypertensive urgency    being managed by PCP and specialists who is in need for long term dialysis access.  Patient is R handed.  Has been on HD for months.  No previous  access surgery.  No defibrillator or pacemaker.  No recent UTI.    No extremity pain and swelling.     no MI  no Stroke  yes DM  yes HTN  no Lupus  no nephropathy  Tobacco use: denies    6/2023: Presents for f/u.  No new issues.  L foot wound has healed.    8/2023:  s/p LUE BC AVF 7/6/23.  Recovered well.  No HD US today.    10/2023:  began using AVF one needle x 1 successfully    10/2023:  DIFFICULTY WITH ACCESS.    2/2024:  unable to make surgery 11/2023.  Using cath for dialysis, occasional AVF.    5/2024:  high pressures at HD, using AVF 2 weeks    Past Medical History:   Diagnosis Date    Cancer     Diabetes mellitus, type 2     Diabetic foot ulcer associated with diabetes mellitus due to underlying condition 07/16/2020    ESRD on hemodialysis     Hyperlipidemia     Hypertension     Obese      Past Surgical History:   Procedure Laterality Date    AV FISTULA PLACEMENT Left 07/06/2023    Procedure: CREATION, AV FISTULA;  Surgeon: Daniel Poon MD;  Location: [facility] OR;  Service: Vascular;  Laterality: Left;  RN PREOP 6/28/2023  LABS DAY OF SURGERY    BONE BIOPSY Left 07/17/2020    Procedure: BIOPSY, BONE;  Surgeon: Verona Whitmore DPM;  Location: [facility] OR;  Service: Podiatry;  Laterality: Left;    CERVICAL DISC SURGERY  07/11/2016    COLONOSCOPY N/A 3/25/2024    Procedure: COLONOSCOPY;  Surgeon: Roopa Pérez MD;  Location: [facility] (4TH FLR);  Service: Endoscopy;  Laterality: N/A;  Ref By: Dr. Pérez   constipation prep  Diaylsis Patient Lab has been ordered  3/20-precall complete-MS    COLONOSCOPY N/A 4/8/2024    Procedure: COLONOSCOPY;  Surgeon: Roopa Pérez MD;  Location: [facility] (2ND FLR);  Service: Endoscopy;  Laterality: N/A;  Prep instructions sent via portal/given to pt in clinic  pt had to be r/s at  request  Dialysis Irene Arguello,Sat-dw  Peg Prep-dw  4/2/24- Labs - dialysis /poor prep on 3/26 - PEG x 2 - extended prep /LVM for precall - ERW  4/5-LVM for precall-KPvt     COLONOSCOPY N/A 4/16/2024    Procedure: COLONOSCOPY;  Surgeon: Jarett Hill MD;  Location: [facility] (2ND FLR);  Service: Endoscopy;  Laterality: N/A;    DEBRIDEMENT Left 07/17/2020    Procedure: DEBRIDEMENT;  Surgeon: Verona Whitmore DPM;  Location: [facility] OR;  Service: Podiatry;  Laterality: Left;    DEBRIDEMENT OF FOOT Right     toes & plantar surface    ENDOBRONCHIAL ULTRASOUND N/A 3/1/2024    Procedure: ENDOBRONCHIAL ULTRASOUND (EBUS);  Surgeon: Francisco Fleming MD;  Location: [facility] 2ND FLR;  Service: Pulmonary;  Laterality: N/A;    ESOPHAGEAL MANOMETRY WITH MEASUREMENT OF IMPEDANCE N/A 03/27/2023    Procedure: MANOMETRY, ESOPHAGUS, WITH IMPEDANCE MEASUREMENT;  Surgeon: Roopa Pérez MD;  Location: [facility] (4TH FLR);  Service: Endoscopy;  Laterality: N/A;  Belching and rumination protocol please  instructions via portal - sm    ESOPHAGOGASTRODUODENOSCOPY N/A 12/16/2022    Procedure: EGD (ESOPHAGOGASTRODUODENOSCOPY);  Surgeon: Eren Francois MD;  Location: [facility];  Service: Endoscopy;  Laterality: N/A;  Pt. on Dialysis. K+ ordered prior to procedure.EC    ESOPHAGOGASTRODUODENOSCOPY N/A 12/5/2023    Procedure: EGD (ESOPHAGOGASTRODUODENOSCOPY);  Surgeon: Kash Johnston MD;  Location: [facility];  Service: Endoscopy;  Laterality: N/A;    FRACTURE SURGERY Right     medial ankle, metal plate present    INJECTION OF ANESTHETIC AGENT AROUND NERVE Right 2/2/2024    Procedure: BLOCK, NERVE STELLATE GANGLION *HOLD ASPIRIN 5 DAYS PRIOR*;  Surgeon: Gokul Gonzalez MD;  Location: [facility] PAIN MGT;  Service: Pain Management;  Laterality: Right;  [phone]    INSERTION OF TUNNELED CENTRAL VENOUS CATHETER (CVC) WITH SUBCUTANEOUS PORT N/A 06/11/2021    Procedure: TUNNEL CATH INSERTION WITHOUT PORT;  Surgeon: Jose Manuel Diagnostic Provider;  Location: [facility] OR;  Service: Radiology;  Laterality: N/A;  11AM START---PHONE PREOP 6/10/21---COVID POSTIVE ON 7/2020---NO S/S    left leg orthopedic surgery Left      REVISION OF ARTERIOVENOUS FISTULA Left 3/14/2024    Procedure: REVISION, AV FISTULA;  Surgeon: Daniel Poon MD;  Location: [facility] OR;  Service: Vascular;  Laterality: Left;  RN preop 3/6/2024----NEED ORDERS    UPPER GASTROINTESTINAL ENDOSCOPY       Family History   Adopted: Yes   Problem Relation Name Age of Onset    Heart disease Father      Colon cancer Neg Hx      Esophageal cancer Neg Hx      Colon polyps Neg Hx      Stomach cancer Neg Hx       Social History     Socioeconomic History    Marital status: Other    Number of children: 1   Tobacco Use    Smoking status: Never    Smokeless tobacco: Never   Substance and Sexual Activity    Alcohol use: Not Currently     Comment: occ rare beer    Drug use: No    Sexual activity: Not Currently   Social History Narrative    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     Social Determinants of Health     Financial Resource Strain: Medium Risk (4/16/2024)    Overall Financial Resource Strain (CARDIA)     Difficulty of Paying Living Expenses: Somewhat hard   Food Insecurity: No Food Insecurity (4/16/2024)    Hunger Vital Sign     Worried About Running Out of Food in the Last Year: Never true     Ran Out of Food in the Last Year: Never true   Transportation Needs: No Transportation Needs (4/16/2024)    PRAPARE - Transportation     Lack of Transportation (Medical): No     Lack of Transportation (Non-Medical): No   Physical Activity: Inactive (4/16/2024)    Exercise Vital Sign     Days of Exercise per Week: 0 days     Minutes of Exercise per Session: 0 min   Stress: Stress Concern Present (4/16/2024)    [employer] - Occupational Stress Questionnaire     Feeling of Stress : To some extent   Housing Stability: Low Risk  (4/16/2024)    Housing Stability Vital Sign     Unable to Pay for Housing in the Last Year: No     Homeless in the Last Year: No       Current Outpatient Medications:     hydrALAZINE (APRESOLINE) 100 MG tablet, Take 1 tablet (100 mg total) by  "mouth 3 (three) times daily., Disp: 270 tablet, Rfl: 3    NIFEdipine (PROCARDIA XL) 90 MG (OSM) 24 hr tablet, Take 1 tablet (90 mg total) by mouth once daily., Disp: 90 tablet, Rfl: 3    REVIEW OF SYSTEMS:  General: No fevers or chills; ENT: No sore throat; Allergy and Immunology: no persistent infections; Hematological and Lymphatic: No history of bleeding or easy bruising; Endocrine: negative; Respiratory: no cough, shortness of breath, or wheezing; Cardiovascular: no chest pain or dyspnea on exertion; Gastrointestinal: no abdominal pain/back, change in bowel habits, or bloody stools; Genito-Urinary: no dysuria, trouble voiding, or hematuria; Musculoskeletal: negative; Neurological: no TIA or stroke symptoms; Psychiatric: no nervousness, anxiety or depression.    PHYSICAL EXAM:      Pulse: 86         General appearance:  Alert, well-appearing, and in no distress.  Oriented to person, place, and time                    Neurological: Normal speech, no focal findings noted; CN II - XII grossly intact. BUE with sensation to light touch.            Musculoskeletal: Digits/nail without cyanosis/clubbing.  Strength 5/5 BUE.                    Neck: Supple, no significant adenopathy, no carotid bruit can be auscultated                  Chest:  Clear to auscultation, no wheezes, rales or rhonchi, symmetric air entry. No use of accessory muscles               Cardiac: Normal rate and regular rhythm, S1 and S2 normal            Abdomen: Soft, nontender, nondistended, no masses or organomegaly, no hernia     No rebound tenderness noted; bowel sounds normal     Pulsatile aortic mass is non palpable.     No groin adenopathy      Extremities:   2 + R radial pulse, 2 + L radial pulse     2 + R brachial pulse, 2 + L brachial pulse     no RUE edema, no LUE edema     neg Dutch's test RUE, neg Dutch's test LUE, +thrill LUE AVF, inc well healed    Skin: No tissue loss    LAB RESULTS:  No results found for: "CBC"  Lab Results "   Component Value Date    LABPROT 12.1 02/26/2024    INR 1.1 02/26/2024     Lab Results   Component Value Date     (L) 05/06/2024    K 5.0 05/06/2024    CL 97 05/06/2024    CO2 24 05/06/2024    GLU 94 05/06/2024    BUN 43 (H) 05/06/2024    CREATININE 4.9 (H) 05/06/2024    CALCIUM 10.1 05/06/2024    ANIONGAP 11 05/06/2024    EGFRNONAA 9.0 (A) 02/23/2022     Lab Results   Component Value Date    WBC 5.95 05/06/2024    RBC 3.43 (L) 05/06/2024    HGB 10.4 (L) 05/06/2024    HCT 31.9 (L) 05/06/2024    MCV 93 05/06/2024    MCH 30.3 05/06/2024    MCHC 32.6 05/06/2024    RDW 15.9 (H) 05/06/2024     05/06/2024    MPV 10.5 05/06/2024    GRAN 4.4 05/06/2024    GRAN 73.3 (H) 05/06/2024    LYMPH 0.8 (L) 05/06/2024    LYMPH 12.8 (L) 05/06/2024    MONO 0.7 05/06/2024    MONO 11.1 05/06/2024    EOS 0.1 05/06/2024    BASO 0.03 05/06/2024    EOSINOPHIL 1.8 05/06/2024    BASOPHIL 0.5 05/06/2024    DIFFMETHOD Automated 05/06/2024     .  Lab Results   Component Value Date    HGBA1C 4.1 05/06/2024       IMAGING:  All pertinent imaging has been reviewed and interpreted independently.    Adequate vein BUE 2022    Left upper extremity dialysis ultrasound shows no hemodynamically significant stenosis.  Flow is adequate.        IMP/PLAN:  59 y.o. male with   Patient Active Problem List   Diagnosis    Type 2 diabetes mellitus with stage 5 chronic kidney disease    HTN (hypertension)    Hyperlipidemia, acquired    ED (erectile dysfunction)    History of diabetic ulcer of foot    Osteomyelitis of second toe of left foot    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    Long term (current) use of antibiotics    Acute osteomyelitis of metatarsal bone of left foot    Diabetic ulcer of toe of left foot    Hyponatremia    Osteomyelitis of left foot    Hypoalbuminemia    Healed ulcer of left foot on examination    Iron deficiency anemia    Metabolic acidosis     Anemia due to chronic kidney disease, on chronic dialysis    Diabetic ulcer of left midfoot associated with type 2 diabetes mellitus, with fat layer exposed    Type II diabetes mellitus with neurological manifestations    Foot ulcer, right, with fat layer exposed    Foot ulceration, left, with fat layer exposed    ESRD on hemodialysis    Malignant renovascular hypertension    Calcified granuloma of lung    Abnormal findings on diagnostic imaging of lung    Tracheobronchomalacia    Intractable hiccups    Serum total bilirubin elevated    Lower extremity weakness    Abnormal MRI, lumbar spine    Goals of care, counseling/discussion    DJD (degenerative joint disease)    Complete lesion of L1 level of lumbar spinal cord    Debility    Diffuse lymphadenopathy    Lytic bone lesions on xray    Hepatic lesion    Splenic lesion    Moderate protein-calorie malnutrition    GI bleed    Acute blood loss anemia    Hypokalemia    Hypertensive urgency    being managed by PCP and specialists who is here today for evaluation of long term HD access.    -s/p LUE AVF superficialization 3/15/24 and AVF use with high pressures - rec fistulogram 5/30/24  -use catheter for HD    I spent 12 minutes evaluating this patient and greater than 50% of the time was spent counseling, coordinator care and discussing the plan of care.  All questions were answered and patient stated understanding with agreement with the above treatment plan.    Daniel Poon MD [facility]  Vascular and Endovascular Surgery

## 2024-05-22 NOTE — DISCHARGE INSTRUCTIONS
You have received 1 dose of potassium lowering medication today.  Your potassium was 5.4 which is slightly above normal limits.  Dr. Villatoro recommends having labs repeated at hemodialysis this week.  You may go to hemodialysis as usual this week.  Return if you develop any symptoms of concern such as chest pain, weakness, palpitations, shortness of breath.

## 2024-05-26 PROBLEM — E87.20 METABOLIC ACIDOSIS: Status: RESOLVED | Noted: 2021-07-27 | Resolved: 2024-05-26

## 2024-05-26 PROBLEM — I16.0 HYPERTENSIVE URGENCY: Status: RESOLVED | Noted: 2024-04-16 | Resolved: 2024-05-26

## 2024-05-26 PROBLEM — M86.9 OSTEOMYELITIS OF SECOND TOE OF LEFT FOOT: Status: RESOLVED | Noted: 2019-10-23 | Resolved: 2024-05-26

## 2024-05-26 PROBLEM — E88.09 HYPOALBUMINEMIA: Status: RESOLVED | Noted: 2020-08-19 | Resolved: 2024-05-26

## 2024-05-26 PROBLEM — E87.6 HYPOKALEMIA: Status: RESOLVED | Noted: 2024-04-15 | Resolved: 2024-05-26

## 2024-05-27 ENCOUNTER — HOSPITAL ENCOUNTER (OUTPATIENT)
Dept: PREADMISSION TESTING | Facility: HOSPITAL | Age: 59
Discharge: HOME | End: 2024-05-27
Attending: SURGERY
Payer: COMMERCIAL

## 2024-05-27 ENCOUNTER — E-CONSULT (OUTPATIENT)
Dept: NEPHROLOGY | Facility: CLINIC | Age: 59
End: 2024-05-27
Payer: COMMERCIAL

## 2024-05-27 ENCOUNTER — TELEPHONE (OUTPATIENT)
Dept: NEUROLOGY | Facility: CLINIC | Age: 59
End: 2024-05-27
Payer: COMMERCIAL

## 2024-05-27 ENCOUNTER — DOCUMENTATION ONLY (OUTPATIENT)
Dept: HEMATOLOGY/ONCOLOGY | Facility: CLINIC | Age: 59
End: 2024-05-27
Payer: COMMERCIAL

## 2024-05-27 ENCOUNTER — TELEPHONE (OUTPATIENT)
Dept: HEMATOLOGY/ONCOLOGY | Facility: CLINIC | Age: 59
End: 2024-05-27
Payer: COMMERCIAL

## 2024-05-27 DIAGNOSIS — N18.6 ESRD (END STAGE RENAL DISEASE): Primary | ICD-10-CM

## 2024-05-27 PROCEDURE — 99451 NTRPROF PH1/NTRNET/EHR 5/>: CPT | Mod: S$GLB,,, | Performed by: INTERNAL MEDICINE

## 2024-05-27 NOTE — PROGRESS NOTES
SW received referral for financial assistance from Dr. Mendoza. Patient is a non-benign patient SW contacted the patient to discuss the referral. Patient reports he need gas money to get to his appointments. SW notified the patient that the oncology department does not have any grants available for non-benign patients. SW will inquire around and follow up with the patient if SW can locate some community resources that can assist him. SW answered and addressed all questions and concerns. Patient verbalized understanding. SW updated Dr. Mendoza to informed there are no funds available for non-benign patient. SW continues to be available as needed.     2:30 PM  SW spoke with the patient to provide [facility] with [facility] [phone]. SW informed the patient [facility] may be able to assist him with a utility bill that may free up some money he can use for gas. SW also informed the patient to contact his dialysis SW. Patient reported he has spoke to his dialysis SW and she is also looking for resources. SW continues to be available as needed.     JANE Jauregui, [facility]  Oncology Social Worker   [facility] - Oncology  [phone]

## 2024-05-27 NOTE — PROGRESS NOTES
Hi team,  Mr. Mcfadden is scheduled for a bone marrow biopsy on 6/24. Offered sooner appointments but patient declined. Please set up a follow up visit with me on 7/5 to review the results.     Thanks!

## 2024-05-27 NOTE — CONSULTS
[facility] - Nephrology  Response for E-Consult     Patient Name: Catalino Mcfadden  MRN: [id number]  Primary Care Provider: Lizbeth, Primary Doctor   Requesting Provider: Brianna Mendoza MD  Consults    Recommendation:  59-year-old male with history of end-stage renal disease.  Needs to have a contrasted CT scan.  He is in need of a contrasted CT scan.  There is no reason to change or alter his dialysis schedule.  Contrast is not an issue with dialysis patients.    Contingency that warrants a repeat eConsult or referral:  As above    Total time of Consultation: 5 minute    I did not speak to the requesting provider verbally about this.     *This eConsult is based on the clinical data available to me and is furnished without benefit of a physical examination. The eConsult will need to be interpreted in light of any clinical issues or changes in patient status not available to me at the time of filing this eConsults. Significant changes in patient condition or level of acuity should result in immediate formal consultation and reevaluation. Please alert me if you have further questions.    Thank you for this eConsult referral.     Adithya Mas MD  [facility] - Nephrology

## 2024-05-28 ENCOUNTER — HOSPITAL ENCOUNTER (OUTPATIENT)
Dept: PREADMISSION TESTING | Facility: HOSPITAL | Age: 59
Discharge: HOME | End: 2024-05-28
Attending: SURGERY
Payer: COMMERCIAL

## 2024-05-28 ENCOUNTER — PATIENT MESSAGE (OUTPATIENT)
Dept: INTERNAL MEDICINE | Facility: CLINIC | Age: 59
End: 2024-05-28
Payer: COMMERCIAL

## 2024-05-28 ENCOUNTER — TELEPHONE (OUTPATIENT)
Dept: VASCULAR SURGERY | Facility: CLINIC | Age: 59
End: 2024-05-28
Payer: COMMERCIAL

## 2024-05-28 VITALS — BODY MASS INDEX: 25.05 KG/M2 | HEIGHT: 73 IN | WEIGHT: 189 LBS

## 2024-05-28 DIAGNOSIS — Z13.9 ENCOUNTER FOR RISK AND FUNCTIONAL ASSESSMENT: Primary | ICD-10-CM

## 2024-05-28 NOTE — TELEPHONE ENCOUNTER
Called pt.to see what dialysis center he goes to. [facility] [place] [facility] [address]. Phone number [phone]. Pt.aware I will be calling to let them know he is scheduled for procedure on 5/30/2024 with  tentative time @ 8:30 am so they can adjust his dialysis treatments. Called and spoke with Nelly and she is aware. Wants to know if pt.will be able to do his treatment after his procedure. Message sent to provider. Pt. aware to call his dialysis center once pre-op calls day before to confirm the time his procedure is scheduled. Pt.verbalized understanding.-

## 2024-05-28 NOTE — PLAN OF CARE
Pre-operative instructions, medication directives and pain scales reviewed with patient. All questions the patient had were answered. Re-assurance about surgical procedure and day of surgery routine given as needed, patient verbalized understanding of the pre-op instructions.  Patient instructed to report to [facility] for surgery.  Patient reminded of lab appt for BMP.

## 2024-05-29 ENCOUNTER — LAB VISIT (OUTPATIENT)
Dept: LAB | Facility: HOSPITAL | Age: 59
End: 2024-05-29
Attending: SURGERY
Payer: COMMERCIAL

## 2024-05-29 DIAGNOSIS — T82.858A ARTERIOVENOUS FISTULA STENOSIS, INITIAL ENCOUNTER: ICD-10-CM

## 2024-05-29 DIAGNOSIS — Z99.2 ESRD ON HEMODIALYSIS: ICD-10-CM

## 2024-05-29 DIAGNOSIS — N18.6 ESRD ON HEMODIALYSIS: ICD-10-CM

## 2024-05-29 LAB
ANION GAP SERPL CALC-SCNC: 10 MMOL/L (ref 8–16)
BUN SERPL-MCNC: 15 MG/DL (ref 6–20)
CALCIUM SERPL-MCNC: 8.3 MG/DL (ref 8.7–10.5)
CHLORIDE SERPL-SCNC: 95 MMOL/L (ref 95–110)
CO2 SERPL-SCNC: 27 MMOL/L (ref 23–29)
CREAT SERPL-MCNC: 3.3 MG/DL (ref 0.5–1.4)
EST. GFR  (NO RACE VARIABLE): 20.6 ML/MIN/1.73 M^2
GLUCOSE SERPL-MCNC: 88 MG/DL (ref 70–110)
POTASSIUM SERPL-SCNC: 3.8 MMOL/L (ref 3.5–5.1)
SODIUM SERPL-SCNC: 132 MMOL/L (ref 136–145)

## 2024-05-29 PROCEDURE — 80048 BASIC METABOLIC PNL TOTAL CA: CPT | Performed by: SURGERY

## 2024-05-29 PROCEDURE — 36415 COLL VENOUS BLD VENIPUNCTURE: CPT | Mod: PO | Performed by: SURGERY

## 2024-05-30 ENCOUNTER — HOSPITAL ENCOUNTER (OUTPATIENT)
Dept: RADIOLOGY | Facility: HOSPITAL | Age: 59
Discharge: HOME | End: 2024-05-30
Attending: SURGERY
Payer: COMMERCIAL

## 2024-05-30 ENCOUNTER — HOSPITAL ENCOUNTER (OUTPATIENT)
Dept: INTERVENTIONAL RADIOLOGY/VASCULAR | Facility: HOSPITAL | Age: 59
Discharge: HOME | End: 2024-05-30
Attending: SURGERY
Payer: COMMERCIAL

## 2024-05-30 ENCOUNTER — HOSPITAL ENCOUNTER (EMERGENCY)
Facility: HOSPITAL | Age: 59
Discharge: HOME | End: 2024-05-30
Attending: STUDENT IN AN ORGANIZED HEALTH CARE EDUCATION/TRAINING PROGRAM
Payer: COMMERCIAL

## 2024-05-30 VITALS
DIASTOLIC BLOOD PRESSURE: 76 MMHG | HEIGHT: 73 IN | OXYGEN SATURATION: 100 % | SYSTOLIC BLOOD PRESSURE: 158 MMHG | RESPIRATION RATE: 16 BRPM | HEART RATE: 78 BPM | BODY MASS INDEX: 24.94 KG/M2

## 2024-05-30 VITALS
DIASTOLIC BLOOD PRESSURE: 94 MMHG | RESPIRATION RATE: 14 BRPM | TEMPERATURE: 99 F | OXYGEN SATURATION: 99 % | SYSTOLIC BLOOD PRESSURE: 201 MMHG | HEART RATE: 73 BPM

## 2024-05-30 DIAGNOSIS — R03.0 ELEVATED BLOOD PRESSURE READING: Primary | ICD-10-CM

## 2024-05-30 DIAGNOSIS — T82.858A ARTERIOVENOUS FISTULA STENOSIS, INITIAL ENCOUNTER: ICD-10-CM

## 2024-05-30 DIAGNOSIS — T82.858A AV FISTULA STENOSIS, INITIAL ENCOUNTER: ICD-10-CM

## 2024-05-30 LAB
POCT GLUCOSE: 70 MG/DL (ref 70–110)
POCT GLUCOSE: 76 MG/DL (ref 70–110)

## 2024-05-30 PROCEDURE — 36901 INTRO CATH DIALYSIS CIRCUIT: CPT | Mod: 78,,, | Performed by: SURGERY

## 2024-05-30 PROCEDURE — 99152 MOD SED SAME PHYS/QHP 5/>YRS: CPT

## 2024-05-30 PROCEDURE — 25500020 PHARM REV CODE 255: Performed by: SURGERY

## 2024-05-30 PROCEDURE — 82962 GLUCOSE BLOOD TEST: CPT

## 2024-05-30 PROCEDURE — 99153 MOD SED SAME PHYS/QHP EA: CPT

## 2024-05-30 PROCEDURE — 63600175 PHARM REV CODE 636 W HCPCS: Performed by: SURGERY

## 2024-05-30 PROCEDURE — 25000003 PHARM REV CODE 250: Performed by: SURGERY

## 2024-05-30 PROCEDURE — 25000003 PHARM REV CODE 250: Performed by: STUDENT IN AN ORGANIZED HEALTH CARE EDUCATION/TRAINING PROGRAM

## 2024-05-30 PROCEDURE — 36901 INTRO CATH DIALYSIS CIRCUIT: CPT | Performed by: SURGERY

## 2024-05-30 PROCEDURE — 99283 EMERGENCY DEPT VISIT LOW MDM: CPT

## 2024-05-30 PROCEDURE — C1894 INTRO/SHEATH, NON-LASER: HCPCS

## 2024-05-30 PROCEDURE — 99152 MOD SED SAME PHYS/QHP 5/>YRS: CPT | Mod: ,,, | Performed by: SURGERY

## 2024-05-30 RX ORDER — HYDRALAZINE HYDROCHLORIDE 20 MG/ML
10 INJECTION INTRAMUSCULAR; INTRAVENOUS ONCE
Status: COMPLETED | OUTPATIENT
Start: 2024-05-30 | End: 2024-05-30

## 2024-05-30 RX ORDER — MIDAZOLAM HYDROCHLORIDE 2 MG/2ML
INJECTION, SOLUTION INTRAMUSCULAR; INTRAVENOUS
Status: COMPLETED | OUTPATIENT
Start: 2024-05-30 | End: 2024-05-30

## 2024-05-30 RX ORDER — HYDRALAZINE HYDROCHLORIDE 20 MG/ML
INJECTION INTRAMUSCULAR; INTRAVENOUS
Status: COMPLETED | OUTPATIENT
Start: 2024-05-30 | End: 2024-05-30

## 2024-05-30 RX ORDER — HEPARIN SODIUM 1000 [USP'U]/ML
INJECTION, SOLUTION INTRAVENOUS; SUBCUTANEOUS
Status: COMPLETED | OUTPATIENT
Start: 2024-05-30 | End: 2024-05-30

## 2024-05-30 RX ORDER — FENTANYL CITRATE 50 UG/ML
INJECTION, SOLUTION INTRAMUSCULAR; INTRAVENOUS
Status: COMPLETED | OUTPATIENT
Start: 2024-05-30 | End: 2024-05-30

## 2024-05-30 RX ORDER — HYDRALAZINE HYDROCHLORIDE 25 MG/1
100 TABLET, FILM COATED ORAL
Status: COMPLETED | OUTPATIENT
Start: 2024-05-30 | End: 2024-05-30

## 2024-05-30 RX ORDER — LABETALOL HYDROCHLORIDE 5 MG/ML
INJECTION, SOLUTION INTRAVENOUS
Status: COMPLETED | OUTPATIENT
Start: 2024-05-30 | End: 2024-05-30

## 2024-05-30 RX ORDER — NIFEDIPINE 30 MG/1
90 TABLET, EXTENDED RELEASE ORAL ONCE
Status: COMPLETED | OUTPATIENT
Start: 2024-05-30 | End: 2024-05-30

## 2024-05-30 RX ORDER — PROTAMINE SULFATE 10 MG/ML
INJECTION, SOLUTION INTRAVENOUS
Status: COMPLETED | OUTPATIENT
Start: 2024-05-30 | End: 2024-05-30

## 2024-05-30 RX ORDER — LIDOCAINE HYDROCHLORIDE 10 MG/ML
INJECTION INFILTRATION; PERINEURAL
Status: COMPLETED | OUTPATIENT
Start: 2024-05-30 | End: 2024-05-30

## 2024-05-30 RX ADMIN — HYDRALAZINE HYDROCHLORIDE 10 MG: 20 INJECTION, SOLUTION INTRAMUSCULAR; INTRAVENOUS at 11:05

## 2024-05-30 RX ADMIN — PROTAMINE SULFATE 5 MG: 10 INJECTION, SOLUTION INTRAVENOUS at 12:05

## 2024-05-30 RX ADMIN — PROTAMINE SULFATE 10 MG: 10 INJECTION, SOLUTION INTRAVENOUS at 11:05

## 2024-05-30 RX ADMIN — LABETALOL HYDROCHLORIDE 10 MG: 5 INJECTION, SOLUTION INTRAVENOUS at 11:05

## 2024-05-30 RX ADMIN — HYDRALAZINE HYDROCHLORIDE 100 MG: 25 TABLET ORAL at 01:05

## 2024-05-30 RX ADMIN — IOHEXOL 40 ML: 350 INJECTION, SOLUTION INTRAVENOUS at 12:05

## 2024-05-30 RX ADMIN — FENTANYL CITRATE 25 MCG: 50 INJECTION, SOLUTION INTRAMUSCULAR; INTRAVENOUS at 12:05

## 2024-05-30 RX ADMIN — LABETALOL HYDROCHLORIDE 10 MG: 5 INJECTION, SOLUTION INTRAVENOUS at 10:05

## 2024-05-30 RX ADMIN — HYDRALAZINE HYDROCHLORIDE 10 MG: 20 INJECTION INTRAMUSCULAR; INTRAVENOUS at 10:05

## 2024-05-30 RX ADMIN — FENTANYL CITRATE 50 MCG: 50 INJECTION, SOLUTION INTRAMUSCULAR; INTRAVENOUS at 10:05

## 2024-05-30 RX ADMIN — MIDAZOLAM HYDROCHLORIDE 1 MG: 1 INJECTION, SOLUTION INTRAMUSCULAR; INTRAVENOUS at 10:05

## 2024-05-30 RX ADMIN — HEPARIN SODIUM 2000 UNITS: 1000 INJECTION, SOLUTION INTRAVENOUS; SUBCUTANEOUS at 11:05

## 2024-05-30 RX ADMIN — LIDOCAINE HYDROCHLORIDE 1 ML: 10 INJECTION, SOLUTION INFILTRATION; PERINEURAL at 11:05

## 2024-05-30 RX ADMIN — PROTAMINE SULFATE 5 MG: 10 INJECTION, SOLUTION INTRAVENOUS at 11:05

## 2024-05-30 RX ADMIN — NIFEDIPINE 90 MG: 30 TABLET, FILM COATED, EXTENDED RELEASE ORAL at 08:05

## 2024-05-30 RX ADMIN — LIDOCAINE HYDROCHLORIDE 2 ML: 10 INJECTION, SOLUTION INFILTRATION; PERINEURAL at 10:05

## 2024-05-30 RX ADMIN — CEFAZOLIN 2 G: 2 INJECTION, POWDER, FOR SOLUTION INTRAMUSCULAR; INTRAVENOUS at 10:05

## 2024-05-30 NOTE — BRIEF OP NOTE
[facility] Interventional Radiology  Brief Operative Note    Surgery Date: 5/30/24    Surgeon: Daniel Poon MD    Assisting: None    Pre-op Diagnosis:  Arteriovenous fistula stenosis, initial encounter    Post-op Diagnosis:  same    Procedure:LUE fistulogram, Central venogram    Anesthesia: Moderate sedation    Operative Findings: no flow limiting stenosis    Estimated Blood Loss: <10 cc         Specimens:   Specimen (24h ago, onward)      None              Discharge Note    OUTCOME: Patient tolerated treatment/procedure well without complication and is now ready for discharge.    DISPOSITION: Home or Self Care    FINAL DIAGNOSIS:  <principal problem not specified>    FOLLOWUP: In clinic    DISCHARGE INSTRUCTIONS:    Discharge Procedure Orders   Remove dressing in 48 hours     Activity as tolerated

## 2024-05-30 NOTE — INTERVAL H&P NOTE
The patient has been examined and the H&P has been reviewed:    I concur with the findings and no changes have occurred since H&P was written.  Mallampati Scale Class 2   ASA Classification Class III     Sedation Plan: Moderate sedation         There are no hospital problems to display for this patient.

## 2024-05-30 NOTE — OP NOTE
Date: 05/30/2024     Surgeon(s) and Role:   Daniel Poon MD    Assistant: None    Pre-op Diagnosis:   1. T82.858A: Stenosis of vascular prosthetic devices, implants and grafts, initial encounter  2.   Patient Active Problem List    Diagnosis Date Noted    GI bleed 04/15/2024    Acute blood loss anemia 04/15/2024    Moderate protein-calorie malnutrition 03/06/2024    Diffuse lymphadenopathy 02/29/2024    Lytic bone lesions on xray 02/29/2024    Hepatic lesion 02/29/2024    Splenic lesion 02/29/2024    Debility 12/17/2023    DJD (degenerative joint disease) 12/15/2023    Complete lesion of L1 level of lumbar spinal cord 12/15/2023    Goals of care, counseling/discussion 12/14/2023    Lower extremity weakness 12/12/2023    Abnormal MRI, lumbar spine 12/12/2023    Intractable hiccups 12/04/2023    Serum total bilirubin elevated 12/04/2023    Calcified granuloma of lung 03/24/2023    Abnormal findings on diagnostic imaging of lung 03/24/2023    Tracheobronchomalacia 03/24/2023    ESRD on hemodialysis 02/22/2023    Malignant renovascular hypertension 02/22/2023    Foot ulceration, left, with fat layer exposed     Foot ulcer, right, with fat layer exposed     Type II diabetes mellitus with neurological manifestations     Diabetic ulcer of left midfoot associated with type 2 diabetes mellitus, with fat layer exposed     Anemia due to chronic kidney disease, on chronic dialysis     Iron deficiency anemia 07/01/2021    Healed ulcer of left foot on examination     Osteomyelitis of left foot     Acute osteomyelitis of metatarsal bone of left foot 07/16/2020    Diabetic ulcer of toe of left foot 07/16/2020    Hyponatremia 07/16/2020    Long term (current) use of antibiotics 12/16/2019    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 11/08/2019    History of diabetic ulcer of foot 07/18/2018    Hyperlipidemia, acquired 12/24/2014    ED (erectile  dysfunction) 12/24/2014    Type 2 diabetes mellitus with stage 5 chronic kidney disease 08/26/2014    HTN (hypertension) 08/26/2014          Post-op Diagnosis: Same     Procedure(s):   1. US guided LUE AVF percutaneous access  2. LUE fistulogram  3. Central venogram  4. Moderate sedation    Anesthesia: Local MAC     Operative Findings: no flow limiting stenosis  Strong palpable thrill     EBL: Minimal    PROCEDURE IN DETAIL:After an informed consent was obtained the patient was taken to the room and placed in the supine position.  Arm was prepped and draped in the standard surgical fashion. Under ultrasound guidance, the LUE AVF was accessed with a micropuncture needle; ultrasound confirmed vessel patency, followed by placement of 4/3-Faroese micropuncture dilator.   A fistulogram and central venogram was performed.  After independent review and interpretation, based upon the fistulogram results, I decided not to intervene.Strong thrill could be felt. The sheath was removed, a stitch was placed with good hemostasis.    MODERATE SEDATION   I was present for and monitored the patients cardio respiratory functions during the moderate sedation. See nurses notes for Intra-service start and end times, the medications their doseage and route.    Time of sedation:  30 mins.

## 2024-05-30 NOTE — PLAN OF CARE
Pt arrived to IR for LUE angiogram. Pt oriented to unit and staff. Plan of care reviewed with patient, patient verbalizes understanding. Comfort measures utilized. Pt safely transferred from stretcher to procedural table. Fall risk reviewed with patient, fall risk interventions maintained. Safety strap applied, positioner pillows utilized to minimize pressure points. Blankets applied. Pt prepped and draped utilizing standard sterile technique. Patient placed on continuous monitoring, as required by sedation policy. Timeouts completed utilizing standard universal time-out, per department and facility policy. RN to remain at bedside, continuous monitoring maintained. Pt resting comfortably. Denies pain/discomfort. Will continue to monitor. See flow sheets for monitoring, medication administration, and updates.

## 2024-05-30 NOTE — DISCHARGE INSTRUCTIONS
Thank you for coming to our Emergency Department today. It is important to remember that some problems or medical conditions are difficult to diagnose and may not be found during your Emergency Department visit.     Be sure to follow up with your primary care doctor and review all labs/imaging/tests that were performed during your ER visit with them. Some labs/tests may be outside of the normal range and require non-emergent follow-up and further investigation to help diagnose/exclude/prevent complications or other potentially serious medical conditions that were not addressed during your ER visit.    If you do not have a primary care doctor, you may contact the one listed on your discharge paperwork or you may also call the [facility] Appointment Desk at [phone] to schedule an appointment and establish care with one. It is important to your health that you have a primary care doctor.    Please take all medications as directed. All medications may potentially have side-effects and it is impossible to predict which medications may give you side-effects or what side-effects (if any) they will give you.. If you feel that you are having a negative effect or side-effect of any medication you should immediately stop taking them and seek medical attention. If you feel that you are having a life-threatening reaction call 911.    Return to the ER with any questions/concerns, new/concerning symptoms, worsening or failure to improve.     Do not drive, swim, climb to height, take a bath, operate heavy machinery, drink alcohol or take potentially sedating medications, sign any legal documents or make any important decisions for 24 hours if you have received any pain medications, sedatives or mood altering drugs during your ER visit or within 24 hours of taking them if they have been prescribed to you.     You can find additional resources for Dentists, hearing aids, durable medical equipment, low cost pharmacies and  other resources at https://[facility].org    BELOW THIS LINE ONLY APPLIES IF YOU HAVE A COVID TEST PENDING OR IF YOU HAVE BEEN DIAGNOSED WITH COVID:  Please access [facility] to review the results of your test. Until the results of your COVID test return, you should isolate yourself so as not to potentially spread illness to others.   If your COVID test returns positive, you should isolate yourself so as not to spread illness to others. After five full days, if you are feeling better and you have not had fever for 24 hours, you can return to your typical daily activities, but you must wear a mask around others for an additional 5 days.   If your COVID test returns negative and you are either unvaccinated or more than six months out from your two-dose vaccine and are not yet boosted, you should still quarantine for 5 full days followed by strict mask use for an additional 5 full days.   If your COVID test returns negative and you have received your 2-dose initial vaccine as well as a booster, you should continue strict mask use for 10 full days after the exposure.  For all those exposed, best practice includes a test at day 5 after the exposure. This can be a home test or a test through one of the many testing centers throughout our community.   Masking is always advised to limit the spread of COVID. Cdc.gov is an excellent site to obtain the latest up to date recommendations regarding COVID and COVID testing.     CDC Testing and Quarantine Guidelines for patients with exposure to a known-positive COVID-19 person:  A close exposure is defined as anyone who has had an exposure (masked or unmasked) to a known COVID -19 positive person within 6 feet of someone for a cumulative total of 15 minutes or more over a 24-hour period.   Vaccinated and/or if you recently had a positive covid test within 90 days do NOT need to quarantine after contact with someone who had COVID-19 unless you develop symptoms.   Fully vaccinated  people who have not had a positive test within 90 days, should get tested 3-5 days after their exposure, even if they don't have symptoms and wear a mask indoors in public for 14 days following exposure or until their test result is negative.      Unvaccinated and/or NOT had a positive test within 90 days and meet close exposure  You are required by CDC guidelines to quarantine for at least 5 days from time of exposure followed by 5 days of strict masking. It is recommended, but not required to test after 5 days, unless you develop symptoms, in which case you should test at that time.  If you get tested after 5 days and your test is positive, your 5 day period of isolation starts the day of the positive test.    If your exposure does not meet the above definition, you can return to your normal daily activities to include social distancing, wearing a mask and frequent handwashing.      Here is a link to guidance from the CDC:  https://www.cdc.gov/media/releases/2021/s1227-isolation-quarantine-guidance.html      [place] Dept Of Health Testing Sites:  https://ldh.la.gov/page/3934      [facility] website with testing locations and guidance:  [email or web address]

## 2024-05-30 NOTE — ED PROVIDER NOTES
Encounter Date: 5/30/2024    SCRIBE #1 NOTE: I, Dominicjennifer Kaye, am scribing for, and in the presence of,  Sherry Cardenas DO.       History     Chief Complaint   Patient presents with    Hypertension     Pt brought from IR for evaluation of hypertension. Pt had a fistulogram to the left upper arm. No complications. Pt was given 20mg of hydralazine and 50 of labetalol with improvement. No distress at this time.      59 y.o. male with PMHx of DMT2, ESRD on HD T/TH/SAT (last received yesterday), HLD, HTN, cancer, presents to the ED from  for evaluation of hypertension PTA. Patient was brought from IR for a systolic BP in the 200s. Reports that he was there for a fistulogram. Patient was given a total of 20 mg Hydralazine, 40 mg Labetalol and 90 mg of nifedipine prior to ED arrival. Patient denies any specific pain. Reports that his daily medications are Hydralazine 100 mg TID and nifedipine QD. States that he didn't take his nifedipine last night but did take a dose of hydralazine this morning. Denies vision changes, HA, CP, SOB, numbness, weakness, paresthesia, or any associated symptoms. Denies tobacco, EtOH or illicit drug use. NKDA.    The history is provided by the patient. No  was used.     Review of patient's allergies indicates:  No Known Allergies  Past Medical History:   Diagnosis Date    Cancer     Diabetes mellitus, type 2     Diabetic foot ulcer associated with diabetes mellitus due to underlying condition 07/16/2020    ESRD on hemodialysis     Hyperlipidemia     Hypertension     Obese      Past Surgical History:   Procedure Laterality Date    AV FISTULA PLACEMENT Left 07/06/2023    Procedure: CREATION, AV FISTULA;  Surgeon: Daniel Poon MD;  Location: [facility] OR;  Service: Vascular;  Laterality: Left;  RN PREOP 6/28/2023  LABS DAY OF SURGERY    BONE BIOPSY Left 07/17/2020    Procedure: BIOPSY, BONE;  Surgeon: Verona Whitmore DPM;  Location: [facility] OR;  Service:  Podiatry;  Laterality: Left;    CERVICAL DISC SURGERY  07/11/2016    COLONOSCOPY N/A 3/25/2024    Procedure: COLONOSCOPY;  Surgeon: Roopa Pérez MD;  Location: [facility] (4TH FLR);  Service: Endoscopy;  Laterality: N/A;  Ref By: Dr. Pérez   constipation prep  Diaylsis Patient Lab has been ordered  3/20-precall complete-MS    COLONOSCOPY N/A 4/8/2024    Procedure: COLONOSCOPY;  Surgeon: Roopa Pérez MD;  Location: [facility] (2ND FLR);  Service: Endoscopy;  Laterality: N/A;  Prep instructions sent via portal/given to pt in clinic  pt had to be r/s at  request  Dialysis Tues,Thurs,Sat-dw  Peg Prep-dw  4/2/24- Labs - dialysis /poor prep on 3/26 - PEG x 2 - extended prep /LVM for precall - ERW  4/5-LVM for precall-KPvt    COLONOSCOPY N/A 4/16/2024    Procedure: COLONOSCOPY;  Surgeon: Jarett Hill MD;  Location: [facility] (2ND FLR);  Service: Endoscopy;  Laterality: N/A;    DEBRIDEMENT Left 07/17/2020    Procedure: DEBRIDEMENT;  Surgeon: Verona Whitmore DPM;  Location: [facility];  Service: Podiatry;  Laterality: Left;    DEBRIDEMENT OF FOOT Right     toes & plantar surface    ENDOBRONCHIAL ULTRASOUND N/A 3/1/2024    Procedure: ENDOBRONCHIAL ULTRASOUND (EBUS);  Surgeon: Francisco Fleming MD;  Location: [facility] OR 2ND FLR;  Service: Pulmonary;  Laterality: N/A;    ESOPHAGEAL MANOMETRY WITH MEASUREMENT OF IMPEDANCE N/A 03/27/2023    Procedure: MANOMETRY, ESOPHAGUS, WITH IMPEDANCE MEASUREMENT;  Surgeon: Roopa Pérez MD;  Location: [facility] (4TH FLR);  Service: Endoscopy;  Laterality: N/A;  Belching and rumination protocol please  instructions via portal -     ESOPHAGOGASTRODUODENOSCOPY N/A 12/16/2022    Procedure: EGD (ESOPHAGOGASTRODUODENOSCOPY);  Surgeon: Eren Francois MD;  Location: [facility];  Service: Endoscopy;  Laterality: N/A;  Pt. on Dialysis. K+ ordered prior to procedure.EC    ESOPHAGOGASTRODUODENOSCOPY N/A 12/5/2023    Procedure: EGD (ESOPHAGOGASTRODUODENOSCOPY);  Surgeon:  Kash Johnston MD;  Location: [facility] ENDO;  Service: Endoscopy;  Laterality: N/A;    FRACTURE SURGERY Right     medial ankle, metal plate present    INJECTION OF ANESTHETIC AGENT AROUND NERVE Right 2/2/2024    Procedure: BLOCK, NERVE STELLATE GANGLION *HOLD ASPIRIN 5 DAYS PRIOR*;  Surgeon: Gokul Gonzalez MD;  Location: [facility] PAIN MGT;  Service: Pain Management;  Laterality: Right;  [phone]    INSERTION OF TUNNELED CENTRAL VENOUS CATHETER (CVC) WITH SUBCUTANEOUS PORT N/A 06/11/2021    Procedure: TUNNEL CATH INSERTION WITHOUT PORT;  Surgeon: Dosnadeem Diagnostic Provider;  Location: [facility] OR;  Service: Radiology;  Laterality: N/A;  11AM START---PHONE PREOP 6/10/21---COVID POSTIVE ON 7/2020---NO S/S    left leg orthopedic surgery Left     REVISION OF ARTERIOVENOUS FISTULA Left 3/14/2024    Procedure: REVISION, AV FISTULA;  Surgeon: Daniel Poon MD;  Location: [facility] OR;  Service: Vascular;  Laterality: Left;  RN preop 3/6/2024----NEED ORDERS    UPPER GASTROINTESTINAL ENDOSCOPY       Family History   Adopted: Yes   Problem Relation Name Age of Onset    Heart disease Father      Colon cancer Neg Hx      Esophageal cancer Neg Hx      Colon polyps Neg Hx      Stomach cancer Neg Hx       Social History     Tobacco Use    Smoking status: Never    Smokeless tobacco: Never   Substance Use Topics    Alcohol use: Not Currently     Comment: occ rare beer    Drug use: No     Review of Systems   Constitutional:  Negative for chills and fever.   HENT:  Negative for drooling and trouble swallowing.    Eyes:  Negative for visual disturbance.   Respiratory:  Negative for shortness of breath.    Cardiovascular:  Negative for chest pain.   Gastrointestinal:  Negative for abdominal pain, nausea and vomiting.   Genitourinary:  Negative for dysuria and hematuria.   Musculoskeletal:  Negative for back pain, neck pain and neck stiffness.   Skin:  Negative for pallor and wound.   Neurological:  Negative for dizziness, syncope, facial  asymmetry, speech difficulty, weakness, light-headedness and headaches.   Psychiatric/Behavioral:  Negative for confusion.    All other systems reviewed and are negative.      Physical Exam     Initial Vitals [05/30/24 1251]   BP Pulse Resp Temp SpO2   (!) 206/86 78 16 -- 99 %      MAP       --         Physical Exam    Nursing note and vitals reviewed.  Constitutional: Vital signs are normal. He appears well-developed. He is not diaphoretic.  Non-toxic appearance.   Chronically ill-appearing   HENT:   Head: Normocephalic and atraumatic.   Right Ear: External ear normal.   Left Ear: External ear normal.   Mouth/Throat: Uvula is midline, oropharynx is clear and moist and mucous membranes are normal.   Eyes: Conjunctivae are normal. No scleral icterus.   Neck: Neck supple. No JVD present.   Normal range of motion.  Cardiovascular:  Normal rate, regular rhythm, normal heart sounds and intact distal pulses.           Pulmonary/Chest: No stridor. No respiratory distress.   Abdominal: Abdomen is soft. He exhibits no distension. There is no abdominal tenderness. There is no rebound and no guarding.   Musculoskeletal:         General: Edema present. No tenderness.      Cervical back: Normal range of motion and neck supple. No rigidity. No spinous process tenderness.      Comments: AV fistula to the LUE with good thrill and bruit. Tunnel catheter to the R upper chest wall with clean, dry and intact dressing. 2+ non pitting edema to bilateral lower extremities.      Neurological: He is alert and oriented to person, place, and time. He has normal strength. He displays no atrophy and no tremor. No sensory deficit. He exhibits normal muscle tone. He displays no seizure activity. Gait normal.   Moves all extremities, follows all commands, no focal neurologic deficits.      Skin: Skin is warm and dry. No rash noted. No erythema.   Psychiatric: He has a normal mood and affect.         ED Course   Procedures  Labs Reviewed - No data  to display       Imaging Results    None          Medications   hydrALAZINE tablet 100 mg (100 mg Oral Given 5/30/24 1335)     Medical Decision Making   MDM  This is an emergent evaluation of a 59 y.o. Male with hypertension with systolic in the 200s x1 day. Initial vitals in the ED    [05/30/24 1251]  BP: (!) 206/86  Pulse: 78  Resp: 16  Temp: n/a  SpO2: 99 % .     Physical exam noted above. DDx includes but is not limited to chronically poorly treated blood pressure, hypertensive emergency. Also considered but clinically less likely to be stroke, ACS, dissection, PE.  Patient remains asymptomatic at this time.  No labs or imaging clinically indicated at this time. Will also provide patient's home dose of hydralazine. Will continue to monitor and frequently reassess pending treatments and final disposition.    Patient is aware of plan and is amenable.     Sherry Cardenas D.O  EMERGENCY MEDICINE  2:02 PM 05/30/2024    UPDATE  On reassessment after patient is treated with his home dose of hydralazine 100 mg, repeat blood pressure 161/78.  Patient remains asymptomatic.  Given benign history, presentation as well as improvement of patient's blood pressure after receiving home blood pressure medicine, I feel patient is stable for discharge at this time.  Remainder vitals also reassuring.  Will discharge with close PCP follow-up in ED return precautions.  Patient is aware and agreeable to plan.    Sherry Cardenas D.O  EMERGENCY MEDICINE   4:06 PM 05/30/2024       This chart was completed using dictation software, as a result there may be some transcription errors      Amount and/or Complexity of Data Reviewed  External Data Reviewed: notes.    Risk  Prescription drug management.            Scribe Attestation:   Scribe #1: I performed the above scribed service and the documentation accurately describes the services I performed. I attest to the accuracy of the note.                           Sherry HINDS  DO Ronald, personally performed the services described in this documentation. All medical record entries made by the scribe were at my direction and in my presence. I have reviewed the chart and agree that the record reflects my personal performance and is accurate and complete.      Clinical Impression:  Final diagnoses:  [R03.0] Elevated blood pressure reading (Primary)          ED Disposition Condition    Discharge Stable          ED Prescriptions    None       Follow-up Information       Follow up With Specialties Details Why Contact Info    [facility] - Emergency Dept Emergency Medicine Go to  If symptoms worsen [address] [facility] [place] [id number] [phone]    Your primary care doctor  Schedule an appointment as soon as possible for a visit on 6/3/2024 Emergency Room Follow-up              Sherry Cardenas DO  05/30/24 [id number]

## 2024-05-30 NOTE — Clinical Note
Left: Arm.   Scrubbed with Chlorhexidine/Alcohol.    Hair: N/A.  Skin prep dry before draping.  Prepped by: Casimiro Romano, RT 5/30/2024 10:28 AM.

## 2024-05-31 ENCOUNTER — PATIENT OUTREACH (OUTPATIENT)
Dept: EMERGENCY MEDICINE | Facility: HOSPITAL | Age: 59
End: 2024-05-31
Payer: COMMERCIAL

## 2024-05-31 ENCOUNTER — TELEPHONE (OUTPATIENT)
Dept: INTERNAL MEDICINE | Facility: CLINIC | Age: 59
End: 2024-05-31
Payer: COMMERCIAL

## 2024-05-31 NOTE — TELEPHONE ENCOUNTER
----- Message from Olga Santillan sent at 5/31/2024 11:10 AM CDT -----  Regarding: Appt Access  Contact: Catalino Mcfadden  Good morning,    Patient visited the ED on 5/30/2024 for elevated blood pressure.  Pt has an existing appt scheduled for 6/17/24 @ 9 a.m; However, in compliance with HEDIS measures, pt's with 2 or more Chronic Conditions require a Post ED visit follow up appt within 7 days of d/c date. I am requesting scheduling assistance, as I am unable to schedule pt a fu appt within this time frame. Please contact pt to schedule a follow up appt either in-office or virtual with any available provider by 6/6/24, if possible.     Thanks in advance,    Olga Santillan  ED Navigator

## 2024-05-31 NOTE — PROGRESS NOTES
Olga Santillan  ED Navigator  Emergency Department    Project: [facility] ED Navigator  Role: Community Health Worker    Date: 05/31/2024  Patient Name: Catalino Mcfadden  MRN: [id number]  PCP: No, Primary Doctor    Assessment:     Catalino Mcfadden is a 59 y.o. male who has presented to ED for elevated blood pressure. Patient has visited the ED 2 times in the past 3 months. Patient did not contact PCP.     ED Navigator Initial Assessment    ED Navigator Enrollment Documentation  Consent to Services  Does patient consent to completing the assessment?: Yes  Contact  Method of Initial Contact: Phone  Transportation  Does the patient have issues with Transportation?: No  Does the patient have transportation to and from healthcare appointments?: Yes  Insurance Coverage  Do you have coverage/adequate coverage?: Yes  Type/kind of coverage: [employer]BS/[employer]  Specialist Appointment  PCP Follow Up Appointment  Medications  Is patient able to afford medication?: Yes  Is patient unable to get medication due to lack of transportation?: No  Psychological  Food  Communication/Education  Other Financial Concerns  Other Social Barriers/Concerns  Primary Barrier  Barriers identified: Structural barrier (service availability, waiting times, etc.)  Root Cause of ED Utilization: Chronic Conditions  Plan to address Chronic Conditions: Remind patient of upcoming PCP/specialist appointment within 24 to 48 hours before scheduled appt (Comment: Messages sent to PCP for a sooner appointment.)  Next steps: Provided Education, Scheduled Appointment/Referral  Scheduled Appointment Date: 6/17/24  Appointment Reminder Date: 6/14/24  Was education/educational materials provided surrounding PCP services/creating a medical home?: Yes Was education verbal or written?: Verbal     Was education/educational materials provided surrounding low cost, healthy foods?: Yes Was education verbal or written?: Verbal     Was education/educational materials provided surrounding  other items? 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     Social Determinants of Health     Financial Resource Strain: Low Risk  (5/31/2024)    Overall Financial Resource Strain (CARDIA)     Difficulty of Paying Living Expenses: Not hard at all   Recent Concern: Financial Resource Strain - Medium Risk (4/16/2024)    Overall Financial Resource Strain (CARDIA)     Difficulty of Paying Living Expenses: Somewhat hard   Food Insecurity: Patient Declined (5/31/2024)    Hunger Vital Sign     Worried About Running Out of Food in the Last Year: Patient declined     Ran Out of Food in the Last Year: Patient declined   Transportation Needs: No Transportation Needs (5/31/2024)    PRAPARE - Transportation     Lack of Transportation (Medical): No     Lack of Transportation (Non-Medical): No   Physical Activity: Patient Declined (5/31/2024)    Exercise Vital Sign     Days of Exercise per Week: Patient declined     Minutes of Exercise per Session: Patient declined   Recent Concern: Physical Activity - Inactive (4/16/2024)    Exercise Vital Sign     Days of Exercise per Week: 0 days     Minutes of Exercise per Session: 0 min   Stress: Patient Declined (5/31/2024)    [employer] [place] [employer] - Occupational Stress Questionnaire     Feeling of Stress : Patient declined   Recent Concern: Stress - Stress Concern Present (4/16/2024)    [employer] [place] [employer] - Occupational Stress Questionnaire     Feeling of Stress : To some extent   Housing Stability: Low Risk  (5/31/2024)     Housing Stability Vital Sign     Unable to Pay for Housing in the Last Year: No     Homeless in the Last Year: No       Plan:     Patient visited the ED on 5/30/2024.  ED Navigator unable to get a sooner PCP appointment for patient.  Patient has an existing PCP, Chauncey Perez MD, on 6/17/2024 at 9 AM.  In-basket message sent to Chauncey Perez MD clinical support staff for further assistance with appointment needs.

## 2024-06-03 ENCOUNTER — TELEPHONE (OUTPATIENT)
Dept: VASCULAR SURGERY | Facility: CLINIC | Age: 59
End: 2024-06-03
Payer: COMMERCIAL

## 2024-06-03 NOTE — TELEPHONE ENCOUNTER
Received message from dialysis nurse @[facility] if ok to use pt. access for dialysis scheduled tomorrow. Checked with provider and ok to use access. Pt. follow up appt.with [facility] HD access prior scheduled. Pt.for possible catheter removal at visit. Spoke with pt. and aware. Spoke with [place] dialysis nurse at [facility] and aware of above,

## 2024-06-04 ENCOUNTER — OFFICE VISIT (OUTPATIENT)
Dept: PODIATRY | Facility: CLINIC | Age: 59
End: 2024-06-04
Payer: COMMERCIAL

## 2024-06-04 ENCOUNTER — TELEPHONE (OUTPATIENT)
Dept: ENDOSCOPY | Facility: HOSPITAL | Age: 59
End: 2024-06-04
Payer: COMMERCIAL

## 2024-06-04 ENCOUNTER — PATIENT OUTREACH (OUTPATIENT)
Dept: EMERGENCY MEDICINE | Facility: HOSPITAL | Age: 59
End: 2024-06-04
Payer: COMMERCIAL

## 2024-06-04 VITALS
WEIGHT: 188.94 LBS | SYSTOLIC BLOOD PRESSURE: 153 MMHG | BODY MASS INDEX: 25.04 KG/M2 | HEIGHT: 73 IN | DIASTOLIC BLOOD PRESSURE: 72 MMHG | HEART RATE: 87 BPM

## 2024-06-04 DIAGNOSIS — M20.5X1 HALLUX LIMITUS, ACQUIRED, RIGHT: ICD-10-CM

## 2024-06-04 DIAGNOSIS — L84 PRE-ULCERATIVE CALLUSES: ICD-10-CM

## 2024-06-04 DIAGNOSIS — M20.5X2 HALLUX LIMITUS, ACQUIRED, LEFT: ICD-10-CM

## 2024-06-04 DIAGNOSIS — Z86.31 HISTORY OF DIABETIC ULCER OF FOOT: ICD-10-CM

## 2024-06-04 DIAGNOSIS — E11.22 TYPE 2 DIABETES MELLITUS WITH STAGE 5 CHRONIC KIDNEY DISEASE: Primary | ICD-10-CM

## 2024-06-04 DIAGNOSIS — L90.9 PLANTAR FAT PAD ATROPHY: ICD-10-CM

## 2024-06-04 DIAGNOSIS — N18.5 TYPE 2 DIABETES MELLITUS WITH STAGE 5 CHRONIC KIDNEY DISEASE: Primary | ICD-10-CM

## 2024-06-04 PROCEDURE — 99214 OFFICE O/P EST MOD 30 MIN: CPT | Mod: S$GLB,,, | Performed by: PODIATRIST

## 2024-06-04 PROCEDURE — 3077F SYST BP >= 140 MM HG: CPT | Mod: CPTII,S$GLB,, | Performed by: PODIATRIST

## 2024-06-04 PROCEDURE — 3044F HG A1C LEVEL LT 7.0%: CPT | Mod: CPTII,S$GLB,, | Performed by: PODIATRIST

## 2024-06-04 PROCEDURE — 99999 PR PBB SHADOW E&M-EST. PATIENT-LVL III: CPT | Mod: PBBFAC,,, | Performed by: PODIATRIST

## 2024-06-04 PROCEDURE — 1159F MED LIST DOCD IN RCRD: CPT | Mod: CPTII,S$GLB,, | Performed by: PODIATRIST

## 2024-06-04 PROCEDURE — 3078F DIAST BP <80 MM HG: CPT | Mod: CPTII,S$GLB,, | Performed by: PODIATRIST

## 2024-06-04 PROCEDURE — 1160F RVW MEDS BY RX/DR IN RCRD: CPT | Mod: CPTII,S$GLB,, | Performed by: PODIATRIST

## 2024-06-04 PROCEDURE — 3008F BODY MASS INDEX DOCD: CPT | Mod: CPTII,S$GLB,, | Performed by: PODIATRIST

## 2024-06-04 PROCEDURE — 3066F NEPHROPATHY DOC TX: CPT | Mod: CPTII,S$GLB,, | Performed by: PODIATRIST

## 2024-06-04 NOTE — TELEPHONE ENCOUNTER
Spoke to pt to schedule colonoscopy w/EMR. Pt stated currently at dialysis and would like a call back to schedule procedure. Reminder set to f/u.

## 2024-06-04 NOTE — PROGRESS NOTES
Subjective:      Patient ID: Catalino Mcfadden is a 59 y.o. male.    Chief Complaint: Diabetes Mellitus (4/29/2024 Dr. Perez ) and Nail Care    Catalino is a 59 y.o. male who presents to the clinic for evaluation and treatment of high risk feet. Catalino has a past medical history of Cancer, Diabetes mellitus, type 2, Diabetic foot ulcer associated with diabetes mellitus due to underlying condition (07/16/2020), ESRD on hemodialysis, Hyperlipidemia, Hypertension, and Obese. Reports new onset left heel blister x several days after a sock rubbed on the left heel. Seen in ED on 11/7/22.     11/16/2022 returns to clinic for follow up of left heel wound.  Seen by my colleague last week, cultures obtained (NGTD) and patient placed on PO abx.  He has kept dressing intact.     11/23/22: F/u left heel ulceration. Presents with calamine coflex left foot.     11/30/22: F/u left heel ulceration. Patient reports going to work this week.     12/14/22: F/u left heel ulceration. Presents in calamine coflex wrap.     12/21/22: F/u left heel ulceration. Presents in calamine coflex wrap.     12/27/22: F/u left heel ulceration. Presents in calamine coflex wrap. No new pedal complaints, continues to work     01/04/23: F/u left heel ulceration. Presents in calamine coflex wrap. No new pedal complaints, continues to work.  Continues to have hiccups      01/11/23: F/u left heel ulceration. Presents in calamine coflex wrap which appears to have padding which shifted medially. No new pedal complaints, continues to work.  Continues to have hiccups    01/18/23: F/u left heel ulceration. Presents in intact calamine coflex wrap. No new pedal complaints.    2/8/23: F/u right foot ulceration. Presents in calamine coflex wrap.     02/15/23: F/u right ulceration. Presents in intact calamine coflex wrap. No new pedal complaints.    3/15/23: F/u right foot ulceration. Presents in calamine coflex wrap.     03/22/23: F/u right ulceration. Presents in intact calamine  coflex wrap. No new pedal complaints.    04/12/23: Returns to clinic for foot inspection following achieving status of healed to bilateral foot wounds.  He has been attempting to care for feet as instructed. 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 Present in NB tennis shoes.  States he became a vegan on 04/04/2023. No new pedal complaints.    06/21/2023 patient returns to clinic for evaluation and treatment of high-risk feet.  He is status post achieving the status of healed to wounds of both feet.  He has been attempting to care the feet as instructed.  He continues to work and attempts to protect the feet in supportive shoes to the best of his ability.  Patient continues to suffer from hiccups.  No new pedal complaints.    07/19/2023 patient returns to clinic for evaluation and treatment of high-risk feet.   He has been attempting to care the feet as instructed. No longer using offloading pads He continues to work and attempts to protect the feet in supportive shoes to the best of his ability.  Patient continues to suffer from hiccups.  No new pedal complaints.    08/23/2023 patient returns to clinic for evaluation treatment of high-risk feet.  He continues to care for feet as instructed.  Recently acquired a new pair of tennis shoes, Denzel 1.  Continues to work for relates he may be looking for new employment in the coming weeks.  Patient relates a new symptom which is loss of balance or feeling like he is losing his equilibrium with walking and with rising.  He denies falls.  He denies nausea, vomiting, fever, chills.  He continues to have hiccups.    11/15/2023 patient returns to clinic for evaluation treatment of high-risk feet.  He continues to care for feet as instructed.   Patient relates that he has become virtually sedentary.  He no longer is employed.  He is living with a friend in [place].  He continues to feel a loss of balance and now also feels weakness  to the lower extremities.  He denies falls.  He denies nausea, vomiting, fever, chills.  He continues to have hiccups.      02/28/2024 patient returns to clinic for evaluation treatment of high-risk feet.  He continues to care for feet as instructed.  Since our last encounter, patient relates that he has now become houseless with most of his belongings being in storage and currently residing in a hotel temporarily.  Patient has plans to return to his hometown of [place] when able.  States that because many of his belongings are in storage he has been wearing the flat nonsupportive tennis shoe that he presents in today.  He states that when he had his feet checked and dialysis they noted increased callus formation to his left lateral foot which was an area previous chronic ulceration and osteomyelitis.  He denies seeing any drainage.  He denies pain secondary to neuropathy.  He has yet to find employment due to consistently failing physical exams.  He continues to feel a loss of balance and now also feels weakness to the lower extremities.  He denies falls.  He continues to have hiccups.    06/04/2024 patient returns to clinic for evaluation treatment of high-risk feet.  Patient relates that he now resides in a small room in [place].   States that because many of his belongings are in storage he continues to wear flat nonsupportive tennis shoes.  He denies pain secondary to neuropathy.  He continues to feel a loss of balance and now also feels weakness to the lower extremities.  He denies falls.  He continues to have hiccups.    Chief Complaint   Patient presents with    Diabetes Mellitus     4/29/2024 Dr. Perez     [facility]         Hemoglobin A1C   Date Value Ref Range Status   05/06/2024 4.1 4.0 - 5.6 % Final     Comment:     ADA Screening Guidelines:  5.7-6.4%  Consistent with prediabetes  >or=6.5%  Consistent with diabetes    High levels of fetal hemoglobin interfere with the HbA1C  assay. Heterozygous  "hemoglobin variants (HbS, HgC, etc)do  not significantly interfere with this assay.   However, presence of multiple variants may affect accuracy.     02/22/2023 5.1 4.0 - 5.6 % Final     Comment:     ADA Screening Guidelines:  5.7-6.4%  Consistent with prediabetes  >or=6.5%  Consistent with diabetes    High levels of fetal hemoglobin interfere with the HbA1C  assay. Heterozygous hemoglobin variants (HbS, HgC, etc)do  not significantly interfere with this assay.   However, presence of multiple variants may affect accuracy.     02/23/2022 5.0 4.0 - 5.6 % Final     Comment:     ADA Screening Guidelines:  5.7-6.4%  Consistent with prediabetes  >or=6.5%  Consistent with diabetes    High levels of fetal hemoglobin interfere with the HbA1C  assay. Heterozygous hemoglobin variants (HbS, HgC, etc)do  not significantly interfere with this assay.   However, presence of multiple variants may affect accuracy.         Review of Systems   Constitutional: Positive for weight loss. Negative for chills.   Cardiovascular:  Negative for chest pain and claudication.   Respiratory:  Negative for cough.    Skin:  Positive for color change, dry skin and nail changes.   Musculoskeletal:  Positive for joint pain and muscle weakness.   Gastrointestinal:  Negative for nausea.   Neurological:  Positive for disturbances in coordination, loss of balance, numbness and paresthesias.   Psychiatric/Behavioral:  The patient is not nervous/anxious.            Objective:      Vitals:    06/04/24 0911   BP: (!) 153/72   Pulse: 87   Weight: 85.7 kg (188 lb 15 oz)   Height: 6' 1" (1.854 m)             Physical Exam  Vitals and nursing note reviewed.   Constitutional:       General: He is not in acute distress.     Appearance: He is not toxic-appearing or diaphoretic.      Comments: Pt. is well-developed, well-nourished, appears stated age, in no acute distress, alert and oriented x 3. No evidence of depression, anxiety, or agitation. Calm, cooperative, and " communicative. Appropriate interactions and affect.   Cardiovascular:      Pulses:           Dorsalis pedis pulses are 2+ on the right side and 2+ on the left side.        Posterior tibial pulses are 1+ on the right side and 1+ on the left side.      Comments: There is decreased digital hair. Skin is atrophic, slightly hyperpigmented  Pulmonary:      Effort: No respiratory distress.   Musculoskeletal:      Right lower leg: Edema present.      Left lower leg: Edema present.      Right ankle: Swelling present. No tenderness. No lateral malleolus, medial malleolus, AITF ligament, CF ligament or posterior TF ligament tenderness.      Right Achilles Tendon: No defects. Hilliard's test negative.      Left ankle: Swelling present. No tenderness. No lateral malleolus, medial malleolus, AITF ligament, CF ligament or posterior TF ligament tenderness.      Left Achilles Tendon: No defects. Hilliard's test negative.      Right foot: No tenderness or bony tenderness.      Left foot: No tenderness or bony tenderness.      Comments: Decreased stride, station of gait.  apropulsive toe off.  Increased angle and base of gait.    There is equinus deformity bilateral with decreased dorsiflexion at the ankle joint bilateral. No tenderness with compression of heel. Negative tinels sign. Gait analysis reveals excessive pronation through midstance and propulsion with early heel off.     Patient has hammertoes of digits 2-5 bilateral partially reducible     Decreased first MPJ range of motion both weightbearing and nonweightbearing, no crepitus observed the first MP joint, + dorsal flag sign.     Visible and palpable bunion without pain at dorsomedial 1st metatarsal head right and left.  Hallux abducted right and left partially reducible, tracks laterally without being track bound.  No ecchymosis, erythema, edema, or cardinal signs infection or signs of trauma same foot.    Fat pad atrophy to heels and met heads bilateral    Plantarflexed  1st ray RLE   Lymphadenopathy:      Comments: No lymphatic streaking    Negative lymphadenopathy bilateral popliteal fossa and tarsal tunnel.     Skin:     General: Skin is warm and dry.      Coloration: Skin is not pale.      Findings: Lesion (see wound descriptions below) present. No ecchymosis, laceration or rash.      Nails: There is no clubbing.      Comments: Toenails 1-5 bilaterally discolored/yellowed, dystrophic, brittle with subungual debris.   Superficial abrasion left dorsal lateral foot.    Focal hyperkeratotic lesion consisting entirely of hyperkeratotic tissue without open skin, drainage, pus, fluctuance, malodor, or signs of infection:  left plantar foot.       Neurological:      Sensory: Sensory deficit present.      Comments:  Bicknell-Dayton 5.07 monofilamant testing is diminished Kp feet. Decreased/absent vibratory sensation bilateral feet to 128Hz tuning fork.     Paresthesias, and hyperesthesia bilateral feet with no clearly identified trigger or source.           Psychiatric:         Attention and Perception: He is attentive.         Mood and Affect: Mood is depressed. Mood is not anxious. Affect is not inappropriate.         Speech: He is communicative. Speech is not slurred.         Behavior: Behavior is not combative.       06/04/2024 02/28/2024    Pre ulcerative callus to left lateral midfoot      Previous site of left medial heel wound        11/15/2023                      08/23/2023     Left foot                Right foot              07/19/2023 06/21/2023 04/12/23:                    Assessment:       Encounter Diagnoses   Name Primary?    Type 2 diabetes mellitus with stage 5 chronic kidney disease Yes    Pre-ulcerative calluses     History of diabetic ulcer of foot     Hallux limitus, acquired, right     Hallux limitus, acquired, left     Plantar fat pad atrophy                      Plan:       Catalino was seen today for diabetes  mellitus and nail care.    Diagnoses and all orders for this visit:    Type 2 diabetes mellitus with stage 5 chronic kidney disease    Pre-ulcerative calluses    History of diabetic ulcer of foot    Hallux limitus, acquired, right    Hallux limitus, acquired, left    Plantar fat pad atrophy        I counseled the patient on his conditions, their implications and medical management.    Education about the prevention of limb loss.    Discussed wound healing cycle, skin integrity, ways to care for skin.Counseled patient on the effects of biomechanical pressure on healing. He verbalizes understanding that it can increase the chances of delayed healing and this prolonged exposure leads to infection or progression of infection which subsequently can result in loss of limb.    Adequate vitamin supplementation, protein intake, and hydration - discussed with patient    All wounds have remained healed, however he continues to have significant callus formation to previous chronic wound site of the left foot and now sub 1st MTPJ of the right foot.  Encouraged patient at his convenience to try to get into his storage to get his more supportive tennis shoes. Skin is still delicate therefore patient must be diligent in avoiding excessive pressure and making sure there is adequate support and padding in shoe gear.     I am concerned about his living situation given his multiple comorbidities.  Patient is a  and hopefully we will be able to get some assistance long-term as well as disability since he has been unable to get employment secondary to his multiple comorbidities    Follow-up: 8-12 weeks but should call [facility] immediately if any signs of infection, such as fever, chills, sweats, increased redness or pain.    Short-term goals include maintaining good offloading and minimizing bioburden, promoting granulation and epithelialization to healing.  Long-term goals include keeping the wound healed by good offloading and  medical management under the direction of internist.    Shoe inspection. Diabetic Foot Education. Patient reminded of the importance of good nutrition and blood sugar control to help prevent podiatric complications of diabetes. Patient instructed on proper foot hygeine. We discussed wearing proper shoe gear, daily foot inspections, never walking without protective shoe gear, never putting sharp instruments to feet.        I spent a total of 33 minutes on the day of the visit.This includes face to face time and non-face to face time preparing to see the patient (eg, review of tests), obtaining and/or reviewing separately obtained history, documenting clinical information in the electronic or other health record, independently interpreting results and communicating results to the patient/family/caregiver, or care coordinator.

## 2024-06-04 NOTE — PROGRESS NOTES
Patient outreached, left VM.  Per patient's chart, patient's PCP followed up with patient and rescheduled PCP appointment to 6/5/2024 at 10:40 AM.

## 2024-06-05 ENCOUNTER — PATIENT MESSAGE (OUTPATIENT)
Dept: ADMINISTRATIVE | Facility: OTHER | Age: 59
End: 2024-06-05
Payer: COMMERCIAL

## 2024-06-05 ENCOUNTER — OFFICE VISIT (OUTPATIENT)
Dept: OPTOMETRY | Facility: CLINIC | Age: 59
End: 2024-06-05
Payer: COMMERCIAL

## 2024-06-05 ENCOUNTER — OFFICE VISIT (OUTPATIENT)
Dept: INTERNAL MEDICINE | Facility: CLINIC | Age: 59
End: 2024-06-05
Payer: COMMERCIAL

## 2024-06-05 ENCOUNTER — PATIENT OUTREACH (OUTPATIENT)
Dept: ADMINISTRATIVE | Facility: OTHER | Age: 59
End: 2024-06-05
Payer: COMMERCIAL

## 2024-06-05 ENCOUNTER — LAB VISIT (OUTPATIENT)
Dept: LAB | Facility: HOSPITAL | Age: 59
End: 2024-06-05
Attending: INTERNAL MEDICINE
Payer: COMMERCIAL

## 2024-06-05 VITALS
HEART RATE: 82 BPM | HEIGHT: 73 IN | DIASTOLIC BLOOD PRESSURE: 72 MMHG | BODY MASS INDEX: 25.27 KG/M2 | SYSTOLIC BLOOD PRESSURE: 132 MMHG | WEIGHT: 190.69 LBS | OXYGEN SATURATION: 99 %

## 2024-06-05 DIAGNOSIS — I10 PRIMARY HYPERTENSION: ICD-10-CM

## 2024-06-05 DIAGNOSIS — E11.22 TYPE 2 DIABETES MELLITUS WITH STAGE 5 CHRONIC KIDNEY DISEASE: ICD-10-CM

## 2024-06-05 DIAGNOSIS — R54 FRAILTY: ICD-10-CM

## 2024-06-05 DIAGNOSIS — H31.001 CHORIORETINAL SCAR OF RIGHT EYE: ICD-10-CM

## 2024-06-05 DIAGNOSIS — R06.6 INTRACTABLE HICCUPS: ICD-10-CM

## 2024-06-05 DIAGNOSIS — Z74.09 IMPAIRED FUNCTIONAL MOBILITY, BALANCE, GAIT, AND ENDURANCE: ICD-10-CM

## 2024-06-05 DIAGNOSIS — N18.5 TYPE 2 DIABETES MELLITUS WITH STAGE 5 CHRONIC KIDNEY DISEASE: ICD-10-CM

## 2024-06-05 DIAGNOSIS — C79.9 METASTATIC MALIGNANT NEOPLASM, UNSPECIFIED SITE: ICD-10-CM

## 2024-06-05 DIAGNOSIS — H25.813 COMBINED FORM OF SENILE CATARACT OF BOTH EYES: ICD-10-CM

## 2024-06-05 DIAGNOSIS — R59.0 MEDIASTINAL LYMPHADENOPATHY: ICD-10-CM

## 2024-06-05 DIAGNOSIS — Z99.2 ESRD ON HEMODIALYSIS: Primary | ICD-10-CM

## 2024-06-05 DIAGNOSIS — R59.1 DIFFUSE LYMPHADENOPATHY: ICD-10-CM

## 2024-06-05 DIAGNOSIS — N18.6 ESRD ON HEMODIALYSIS: Primary | ICD-10-CM

## 2024-06-05 DIAGNOSIS — E11.42 DIABETIC POLYNEUROPATHY ASSOCIATED WITH TYPE 2 DIABETES MELLITUS: ICD-10-CM

## 2024-06-05 DIAGNOSIS — R53.81 DEBILITY: ICD-10-CM

## 2024-06-05 DIAGNOSIS — E11.9 DIABETES MELLITUS TYPE 2 WITHOUT RETINOPATHY: Primary | ICD-10-CM

## 2024-06-05 PROCEDURE — 1159F MED LIST DOCD IN RCRD: CPT | Mod: CPTII,S$GLB,, | Performed by: OPTOMETRIST

## 2024-06-05 PROCEDURE — 99999 PR PBB SHADOW E&M-EST. PATIENT-LVL III: CPT | Mod: PBBFAC,,, | Performed by: OPTOMETRIST

## 2024-06-05 PROCEDURE — 3066F NEPHROPATHY DOC TX: CPT | Mod: CPTII,S$GLB,, | Performed by: OPTOMETRIST

## 2024-06-05 PROCEDURE — 3066F NEPHROPATHY DOC TX: CPT | Mod: CPTII,S$GLB,, | Performed by: INTERNAL MEDICINE

## 2024-06-05 PROCEDURE — 2023F DILAT RTA XM W/O RTNOPTHY: CPT | Mod: CPTII,S$GLB,, | Performed by: OPTOMETRIST

## 2024-06-05 PROCEDURE — 36415 COLL VENOUS BLD VENIPUNCTURE: CPT | Performed by: INTERNAL MEDICINE

## 2024-06-05 PROCEDURE — 92004 COMPRE OPH EXAM NEW PT 1/>: CPT | Mod: S$GLB,,, | Performed by: OPTOMETRIST

## 2024-06-05 PROCEDURE — 99999 PR PBB SHADOW E&M-EST. PATIENT-LVL IV: CPT | Mod: PBBFAC,,, | Performed by: INTERNAL MEDICINE

## 2024-06-05 PROCEDURE — 3044F HG A1C LEVEL LT 7.0%: CPT | Mod: CPTII,S$GLB,, | Performed by: INTERNAL MEDICINE

## 2024-06-05 PROCEDURE — 99214 OFFICE O/P EST MOD 30 MIN: CPT | Mod: S$GLB,,, | Performed by: INTERNAL MEDICINE

## 2024-06-05 PROCEDURE — 3044F HG A1C LEVEL LT 7.0%: CPT | Mod: CPTII,S$GLB,, | Performed by: OPTOMETRIST

## 2024-06-05 PROCEDURE — 3008F BODY MASS INDEX DOCD: CPT | Mod: CPTII,S$GLB,, | Performed by: INTERNAL MEDICINE

## 2024-06-05 PROCEDURE — 1160F RVW MEDS BY RX/DR IN RCRD: CPT | Mod: CPTII,S$GLB,, | Performed by: OPTOMETRIST

## 2024-06-05 PROCEDURE — 82164 ANGIOTENSIN I ENZYME TEST: CPT | Performed by: INTERNAL MEDICINE

## 2024-06-05 PROCEDURE — 3075F SYST BP GE 130 - 139MM HG: CPT | Mod: CPTII,S$GLB,, | Performed by: INTERNAL MEDICINE

## 2024-06-05 PROCEDURE — 3078F DIAST BP <80 MM HG: CPT | Mod: CPTII,S$GLB,, | Performed by: INTERNAL MEDICINE

## 2024-06-05 NOTE — PROGRESS NOTES
HPI    YADIEL: 2-3 year ago   Last DFE: 2-3 year ago   Chief complaint (CC): 58 yo M here for Diabetic eye exam. Pt reports mild   light sensitivity. Pt denies any ocular or visual complaints today.  Glasses? Yes   Contacts? No  H/o eye surgery, injections or laser: No  H/o eye injury: No  Known eye conditions? No  Family h/o eye conditions? No (was adopted)  Eye gtts? No      (-) Flashes (-)  Floaters (-) Mucous   (+)  Tearing (-) Itching (-) Burning   (-) Headaches (-) Eye Pain/discomfort (-) Irritation   (-)  Redness (-) Double vision (-) Blurry vision    Diabetic? Yes (not sure)  A1c? Lab Results       Component                Value               Date                       HGBA1C                   4.1                 05/06/2024            Last edited by Gisselle Maier, OD on 6/5/2024  8:53 AM.            Assessment /Plan     For exam results, see Encounter Report.      Diabetes mellitus type 2 without retinopathy  Type 2 diabetes mellitus with stage 5 chronic kidney disease  - Pt educated on the importance of maintaining adequate glycemic and blood pressure control via diet, compliance with medications, exercise and timely follow up with PCP.  No diabetic retinopathy, No CSME.     Chorioretinal scar of right eye   - Extensive non-central scarring- pt denies h/o surgery/laser. However reports h/o injections OD    Combined form of senile cataract of both eyes  - Visually significant. Pt interested in CE however is concerned about cost. Pt advised to avoid premium IOLs.  - Referred for CE evaluation.

## 2024-06-05 NOTE — PROGRESS NOTES
CHW - Initial Contact    Oliva Viramontes, Community Health Worker completed the Social Determinant of Health questionnaire with patient in the clinic today.    Pt identified barriers of most importance are: Pt stated that he is trying to apply for disablity benefits.  CHW offered to connect Pt with with the assistance [employer] [place] to assist with his social security disability case.  Pt stated he is using a out of town law firm, called the [employer] in [place], [place]. Mr. Mcfadden stated that her heard a commercial this is how he found this law firm. Pt stated that he has financial difficutlies due to this unemployment benefits running out.  CHW provided Pt with an emergemcy assistance truong from the [place] [facility] to bring [facility] tomorrow to give to the .   Pt stated has SNAP benefits also ended recently.   CHW asked Pt if completed his simplfied report.  Pt stated he is unsure. CHW asked Pt to call the Department of Children and Family Services to find out.  Pt stated he has their telephone number in his phone; however, ehe lost his wallet with his social security card, SNAP card and drivers license.   Dr. Chauncey Rosas will put in a referral for Pt o have an evaluation for Disablity Disablities Determination.  Pt contacted his sister Pam Henderson [phone] to tell her that he is at [facility] trying to establish care with a provider and Community Health Worker is assisting him with some of his needs.   Referrals to community agencies completed with patient/caregiver consent outside of [facility] include: No   Referrals were put through [facility] - No   Other information discussed the patient needs / wants help with: None at this time   Follow-up Outreach - Due: 6/11/2024

## 2024-06-05 NOTE — PROGRESS NOTES
Subjective:       Patient ID: Catalino Mcfadden is a 59 y.o. male.    Chief Complaint: Follow-up      HPI  Catalino Mcfadden is a 59 y.o. year old male with ESRD on HD, t2DM, HTN, diffuse metastatic disease of unknown primary vs inflammatory /infectious process. Being followed by multiple speciality clinics including heme/onc, infectious disease, pulmonary, gastroenterology. Patient last PCP 2 years ago, Dr. Lombard.     60 pound weight loss in the past 1.5 years.   Being worked up by hematology / oncology for metastatic disease of unknown primary vs inflammatory/infectious process.   C-scope done without mass noted  PET CT done 1/2024 - Hypermetabolic lymphadenopathy throughout supraclavicular region, mediastinum, bilateral steven, and involving upper abdomen. Numerous tracer avid hepatic and splenic lesions. Numerous tracer avid osseous lesions. Overall findings are concerning for diffuse metastasis.     Walks with cane, able to walk 200 feet  Unable to stand for extended periods of time due to neuropathy  Fatigue with exertion  Able to carry his own groceries into the home.   Unable to lift >15 pounds for extended periods of time.     Is working with  to file for disability.     Review of Systems   Constitutional:  Positive for fatigue. Negative for activity change, appetite change, chills, fever and unexpected weight change.        Uses cane   HENT:  Negative for congestion, rhinorrhea and sore throat.    Eyes:  Negative for visual disturbance.   Respiratory:  Negative for shortness of breath.    Cardiovascular:  Negative for chest pain.   Gastrointestinal:  Negative for abdominal pain, diarrhea, nausea and vomiting.   Genitourinary:  Negative for difficulty urinating and dysuria.   Musculoskeletal:  Positive for arthralgias, back pain, gait problem and myalgias.   Skin:  Negative for color change and rash.   Neurological:  Positive for weakness. Negative for dizziness and headaches.         Past Medical History:  "  Diagnosis Date    Cancer     Diabetes mellitus, type 2     Diabetic foot ulcer associated with diabetes mellitus due to underlying condition 07/16/2020    ESRD on hemodialysis     Hyperlipidemia     Hypertension     Obese         Prior to Admission medications    Medication Sig Start Date End Date Taking? Authorizing Provider   hydrALAZINE (APRESOLINE) 100 MG tablet Take 1 tablet (100 mg total) by mouth 3 (three) times daily. 3/28/24 3/28/25 Yes Mary Jo Villatoro MD   NIFEdipine (PROCARDIA XL) 90 MG (OSM) 24 hr tablet Take 1 tablet (90 mg total) by mouth once daily. 5/23/23 6/5/24 Yes Mary Jo Villatoro MD        Past medical history, surgical history, and family medical history reviewed and updated as appropriate.    Medications and allergies reviewed.     Objective:          Vitals:    06/05/24 1006   BP: 132/72   BP Location: Right arm   Patient Position: Sitting   BP Method: Medium (Manual)   Pulse: 82   SpO2: 99%   Weight: 86.5 kg (190 lb 11.2 oz)   Height: 6' 1" (1.854 m)     Body mass index is 25.16 kg/m².  Physical Exam  Constitutional:       General: He is not in acute distress.     Appearance: Normal appearance. He is not diaphoretic.      Comments: Chronically ill appearing   HENT:      Head: Normocephalic and atraumatic.      Mouth/Throat:      Mouth: Mucous membranes are moist.      Pharynx: Oropharynx is clear. No oropharyngeal exudate.   Eyes:      Extraocular Movements: Extraocular movements intact.   Cardiovascular:      Rate and Rhythm: Normal rate.      Heart sounds: No murmur heard.     No friction rub. No gallop.   Pulmonary:      Effort: No respiratory distress.      Breath sounds: Normal breath sounds.   Abdominal:      General: Bowel sounds are normal.      Palpations: There is no mass.      Tenderness: There is no abdominal tenderness.      Hernia: No hernia is present.   Musculoskeletal:         General: No tenderness or deformity. Normal range of motion.   Skin:     General: Skin is " warm.      Capillary Refill: Capillary refill takes less than 2 seconds.   Neurological:      General: No focal deficit present.      Mental Status: He is alert and oriented to person, place, and time. Mental status is at baseline.         Lab Results   Component Value Date    WBC 4.60 05/22/2024    HGB 10.8 (L) 05/22/2024    HCT 33.0 (L) 05/22/2024     (L) 05/22/2024    CHOL 217 (H) 05/06/2024    TRIG 54 05/06/2024    HDL 71 05/06/2024    ALT 31 05/22/2024    AST 51 (H) 05/22/2024     (L) 05/29/2024    K 3.8 05/29/2024    CL 95 05/29/2024    CREATININE 3.3 (H) 05/29/2024    BUN 15 05/29/2024    CO2 27 05/29/2024    TSH 1.736 05/06/2024    PSA 1.6 02/23/2022    INR 1.1 02/26/2024    HGBA1C 4.1 05/06/2024       Assessment:       1. ESRD on hemodialysis    2. Primary hypertension    3. Type 2 diabetes mellitus with stage 5 chronic kidney disease    4. Diabetic polyneuropathy associated with type 2 diabetes mellitus    5. Diffuse lymphadenopathy    6. Intractable hiccups    7. Impaired functional mobility, balance, gait, and endurance    8. Debility    9. Frailty    10. Mediastinal lymphadenopathy    11. Metastatic malignant neoplasm, unspecified site          Plan:     Catalino was seen today for follow-up.    Diagnoses and all orders for this visit:    ESRD on hemodialysis  -     Ambulatory referral/consult to Outpatient Case Management    Primary hypertension    Type 2 diabetes mellitus with stage 5 chronic kidney disease  -     Ambulatory referral/consult to Outpatient Case Management    Diabetic polyneuropathy associated with type 2 diabetes mellitus    Diffuse lymphadenopathy  -     ANGIOTENSIN CONVERTING ENZYME; Future    Intractable hiccups    Impaired functional mobility, balance, gait, and endurance    Debility    Frailty    Mediastinal lymphadenopathy    Metastatic malignant neoplasm, unspecified site        Will refer to outpatiente     Health maintenance reviewed with patient.     Follow up in  about 6 weeks (around 7/17/2024).    Chauncey Perez MD  Internal Medicine / Primary Care  [facility]  6/5/2024

## 2024-06-05 NOTE — PATIENT INSTRUCTIONS
Schedule pulmonology appointment.   Labwork today    Referral placed to outpatient case management.     Return to clinic in 6 weeks.

## 2024-06-06 ENCOUNTER — PATIENT OUTREACH (OUTPATIENT)
Dept: ADMINISTRATIVE | Facility: OTHER | Age: 59
End: 2024-06-06
Payer: COMMERCIAL

## 2024-06-06 NOTE — PROGRESS NOTES
CHW - Follow Up    Oliva Viramontes, Community Health Worker completed a follow up visit with patient via telephone today.  Pt/Caregiver reported: Mr. Mcfadden informed CHW that he went to dialysis today and Ms. Tara Merida, [place] did not complete the [employer] Crisis Intervention truong for $75.   Community Health Worker provided: CHW contacted Ms. Merida at [facility]  at [phone] to discuss the crisis truong application.   LCSW stated Pt did not any receipts.  CHW explained that [facility] pays [facility] his [facility], but Pt is unemployed and needs gas money to get to dialysis and food.  Pt also stated he lost his wallet with his drivers license and other information. CHW explained that Pt put his sister Pam on the telephone with her on yesterday and she helps Pt pays his bills, but he has no money.  CHW also called Ms. Piper at the [facility] to discuss Pts truong application.    CHW asked LCSW to assist Pt with the truong application and SSI/SSDI. CHW contacted [facility] to help Pt get replacement  drivers license and other important items.     Follow-up Outreach - Due: 6/14/2024

## 2024-06-07 ENCOUNTER — PATIENT OUTREACH (OUTPATIENT)
Dept: ADMINISTRATIVE | Facility: OTHER | Age: 59
End: 2024-06-07
Payer: COMMERCIAL

## 2024-06-07 LAB — ACE SERPL-CCNC: 84 U/L (ref 16–85)

## 2024-06-10 ENCOUNTER — PATIENT OUTREACH (OUTPATIENT)
Dept: ADMINISTRATIVE | Facility: OTHER | Age: 59
End: 2024-06-10
Payer: COMMERCIAL

## 2024-06-10 NOTE — PROGRESS NOTES
CHW - Follow Up    Oliva Viramontes Community Health Worker completed a follow up visit with patient via telephone today.  Pt/Caregiver reported: Mr. Mcfadden informed CHW that Tara Broderick LCSW is coming by his house to complete an application for SSI and the [facility] Crisis Intervention [place].    Community Health Worker provided: CHW will call Pt back on next week.    Follow-up Outreach - Due: 6/21/2024

## 2024-06-10 NOTE — PROGRESS NOTES
CHW - Follow Up    Mr. Mcfadden stated he was not able to talk today and asked CHW to call him back on next week.    Follow-up Outreach - Due: 6/10/2024

## 2024-06-11 ENCOUNTER — TELEPHONE (OUTPATIENT)
Dept: ENDOSCOPY | Facility: HOSPITAL | Age: 59
End: 2024-06-11
Payer: COMMERCIAL

## 2024-06-11 ENCOUNTER — PATIENT MESSAGE (OUTPATIENT)
Dept: ENDOSCOPY | Facility: HOSPITAL | Age: 59
End: 2024-06-11
Payer: COMMERCIAL

## 2024-06-11 DIAGNOSIS — Z99.2 DIALYSIS PATIENT: Primary | ICD-10-CM

## 2024-06-11 DIAGNOSIS — K63.5 POLYP OF COLON, UNSPECIFIED PART OF COLON, UNSPECIFIED TYPE: Primary | ICD-10-CM

## 2024-06-11 DIAGNOSIS — K63.5 POLYP OF COLON, UNSPECIFIED PART OF COLON, UNSPECIFIED TYPE: ICD-10-CM

## 2024-06-11 DIAGNOSIS — Z12.11 SCREEN FOR COLON CANCER: Primary | ICD-10-CM

## 2024-06-11 NOTE — TELEPHONE ENCOUNTER
colonoscopy in 3 months with [facility] doc, [facility], removal of TI polyp and assess prior EMR sites. Referring Dr Pérez. Cristiano

## 2024-06-11 NOTE — TELEPHONE ENCOUNTER
Spoke to patient to schedule procedure(s) Colonoscopy/EMR       Physician to perform procedure(s) Dr. RADHA Quinonez  Date of Procedure (s) 07/22/2024  Arrival Time 11:30 AM  Time of Procedure(s) 12:30 PM   Location of Procedure(s) [facility] [address] Floor  Type of Rx Prep sent to patient: PEG  Instructions provided to patient via [facility]    Patient was informed on the following information and verbalized understanding. Screening questionnaire reviewed with patient and complete. If procedure requires anesthesia, a responsible adult needs to be present to accompany the patient home, patient cannot drive after receiving anesthesia. Appointment details are tentative, especially check-in time. Patient will receive a prep-op call 7 days prior to confirm check-in time for procedure. If applicable the patient should contact their pharmacy to verify Rx for procedure prep is ready for pick-up. Patient was advised to call the scheduling department at [phone] if pharmacy states no Rx is available. Patient was advised to call the endoscopy scheduling department if any questions or concerns arise.      [facility] Scheduling Department

## 2024-06-12 ENCOUNTER — OUTPATIENT CASE MANAGEMENT (OUTPATIENT)
Dept: ADMINISTRATIVE | Facility: OTHER | Age: 59
End: 2024-06-12
Payer: COMMERCIAL

## 2024-06-14 ENCOUNTER — PATIENT OUTREACH (OUTPATIENT)
Dept: EMERGENCY MEDICINE | Facility: HOSPITAL | Age: 59
End: 2024-06-14
Payer: COMMERCIAL

## 2024-06-18 ENCOUNTER — PATIENT OUTREACH (OUTPATIENT)
Dept: ADMINISTRATIVE | Facility: OTHER | Age: 59
End: 2024-06-18
Payer: COMMERCIAL

## 2024-06-18 NOTE — PROGRESS NOTES
CHW - Follow Up    Oliva Viramontes Community Health Worker completed a follow up visit with patient via telephone today.  Pt/Caregiver reported: Mr. Mcfadden stated that Ms. Tara Calloway LCSW has not assisted him with applying for SSI or the [employer] Crisis truong as of yet.   Community Health Worker provided: CHW asked Pt to remind her when he goes to dialysis on Thursday.   Follow-up Outreach - Due: 6/21/2024

## 2024-06-19 ENCOUNTER — HOSPITAL ENCOUNTER (OUTPATIENT)
Dept: CARDIOLOGY | Facility: HOSPITAL | Age: 59
Discharge: HOME | End: 2024-06-19
Attending: SURGERY
Payer: COMMERCIAL

## 2024-06-19 DIAGNOSIS — Z99.2 ESRD ON HEMODIALYSIS: ICD-10-CM

## 2024-06-19 DIAGNOSIS — T82.858A ARTERIOVENOUS FISTULA STENOSIS, INITIAL ENCOUNTER: ICD-10-CM

## 2024-06-19 DIAGNOSIS — N18.6 ESRD ON HEMODIALYSIS: ICD-10-CM

## 2024-06-19 LAB
HD ANASTAMOSIS VESSEL: NORMAL
HD FISTULA OUTFLOW VEIN LOCATION: NORMAL
HD FISTULA OUTFLOW VEIN VESSEL: NORMAL
HD INFLOW ARTERY VESSEL: NORMAL
RIGHT DIS GRAFT DEPTH: 0.3 CM
RIGHT DIS GRAFT DIA: 1.2 CM
RIGHT DIS GRAFT PSV: 720 CM/ SEC
RIGHT INFLOW PSV: 297 CM/S
RIGHT MID GRAFT DEPTH: 0.2 CM
RIGHT MID GRAFT DIA: 0.9 CM
RIGHT MID GRAFT PSV: 125 CM/S
RIGHT OUTFLOW VEIN PSV: 392 CM/ SEC
RIGHT PROX ANA PSV: 427 CM/S
RIGHT PROX GRAFT DEPTH: 0.3 CM
RIGHT PROX GRAFT DIA: 1.3 CM
RIGHT PROX GRAFT PSV: 102 CM/S
RIGHT VOLUME FLOW DIA: 0.9 CM
RIGHT VOLUME FLOW PSV: 2056 ML/MIN

## 2024-06-19 PROCEDURE — 93990 DOPPLER FLOW TESTING: CPT | Mod: TC

## 2024-06-19 PROCEDURE — 93990 DOPPLER FLOW TESTING: CPT | Mod: 26,,, | Performed by: SURGERY

## 2024-06-21 ENCOUNTER — OUTPATIENT CASE MANAGEMENT (OUTPATIENT)
Dept: ADMINISTRATIVE | Facility: OTHER | Age: 59
End: 2024-06-21
Payer: COMMERCIAL

## 2024-06-24 ENCOUNTER — TELEPHONE (OUTPATIENT)
Dept: HEMATOLOGY/ONCOLOGY | Facility: CLINIC | Age: 59
End: 2024-06-24
Payer: COMMERCIAL

## 2024-06-24 ENCOUNTER — PATIENT OUTREACH (OUTPATIENT)
Dept: ADMINISTRATIVE | Facility: OTHER | Age: 59
End: 2024-06-24
Payer: COMMERCIAL

## 2024-06-24 ENCOUNTER — PATIENT MESSAGE (OUTPATIENT)
Dept: ADMINISTRATIVE | Facility: OTHER | Age: 59
End: 2024-06-24
Payer: COMMERCIAL

## 2024-06-24 ENCOUNTER — TELEPHONE (OUTPATIENT)
Dept: ADMINISTRATIVE | Facility: OTHER | Age: 59
End: 2024-06-24
Payer: COMMERCIAL

## 2024-06-24 NOTE — TELEPHONE ENCOUNTER
Outreach for OPCM Enrollment x4. No answer. Left Detailed Message with contact info requesting call back. spoke with CHRISTIAN Cabezas to give Pt my phone number as well.

## 2024-06-24 NOTE — PROGRESS NOTES
CHW - Follow Up    Oliva Viramontes, Community Health Worker completed a follow up visit with patient via telephone today.  Pt/Caregiver reported: Mr. Mcfadden stated that he does not have gas money for his dialysis appointment on tomorrow.     CHW contacted Ms. Tara Perez, LCSW and Ms. Stratton at [facility] to inform them at Pt is coming to dialysis but needs gas money to get home.  CHW suggested doing to a dialysis center in [place] due to distance of the trip.   Ms. Stratton stated that Pt did apply for SSI. PT also asked CHW to contact Mr. Dela Cruz at [facility] at [phone], because he was told they are going to evict him.  Due to [facility] confidentially policy, they do not speak with third parties without written permission.  CHW spoke with Rita Yang RN to contact Pt later this week.    Follow-up Outreach - Due: 6/30/2024

## 2024-06-25 ENCOUNTER — PATIENT MESSAGE (OUTPATIENT)
Dept: ADMINISTRATIVE | Facility: OTHER | Age: 59
End: 2024-06-25
Payer: COMMERCIAL

## 2024-06-26 ENCOUNTER — TELEPHONE (OUTPATIENT)
Dept: ADMINISTRATIVE | Facility: OTHER | Age: 59
End: 2024-06-26
Payer: COMMERCIAL

## 2024-06-26 ENCOUNTER — PATIENT MESSAGE (OUTPATIENT)
Dept: ADMINISTRATIVE | Facility: OTHER | Age: 59
End: 2024-06-26
Payer: COMMERCIAL

## 2024-06-26 ENCOUNTER — PATIENT OUTREACH (OUTPATIENT)
Dept: ADMINISTRATIVE | Facility: OTHER | Age: 59
End: 2024-06-26
Payer: COMMERCIAL

## 2024-06-26 NOTE — PROGRESS NOTES
CHW - Follow Up    Oliva Viramontes, Community Health Worker completed a follow up visit with patient via telephone today.  Pt/Caregiver reported: Mr. Mcfadden contacted CHW to inform her that he is going to the [facility] to check on his SSI.    Community Health Worker provided: CHW mailed an application for [employer] and [facility] [employer] transportation.  CHW sent a message to Ms. Ivan Fernandez for our prescription assistance program.  CHW contacted the [employer] at [phone] to try to obtain financial assistance for Mr. Mcfadden. CHW was unable to speak with anyone at the [employer].    Follow-up Outreach - Due: 7/2/2024

## 2024-06-26 NOTE — TELEPHONE ENCOUNTER
Outreach attempt for OPCM Enrollment x5. No answer. Left Detailed Voice Message with contact info requesting call back.

## 2024-06-27 ENCOUNTER — OFFICE VISIT (OUTPATIENT)
Dept: PODIATRY | Facility: CLINIC | Age: 59
End: 2024-06-27
Payer: COMMERCIAL

## 2024-06-27 ENCOUNTER — PATIENT OUTREACH (OUTPATIENT)
Dept: ADMINISTRATIVE | Facility: OTHER | Age: 59
End: 2024-06-27
Payer: COMMERCIAL

## 2024-06-27 VITALS
HEART RATE: 82 BPM | HEIGHT: 73 IN | WEIGHT: 194.25 LBS | DIASTOLIC BLOOD PRESSURE: 84 MMHG | SYSTOLIC BLOOD PRESSURE: 178 MMHG | BODY MASS INDEX: 25.74 KG/M2

## 2024-06-27 DIAGNOSIS — Z86.31 HISTORY OF DIABETIC ULCER OF FOOT: ICD-10-CM

## 2024-06-27 DIAGNOSIS — L84 PRE-ULCERATIVE CALLUSES: ICD-10-CM

## 2024-06-27 DIAGNOSIS — N18.5 TYPE 2 DIABETES MELLITUS WITH STAGE 5 CHRONIC KIDNEY DISEASE: Primary | ICD-10-CM

## 2024-06-27 DIAGNOSIS — E11.22 TYPE 2 DIABETES MELLITUS WITH STAGE 5 CHRONIC KIDNEY DISEASE: Primary | ICD-10-CM

## 2024-06-27 PROCEDURE — 1160F RVW MEDS BY RX/DR IN RCRD: CPT | Mod: CPTII,S$GLB,, | Performed by: PODIATRIST

## 2024-06-27 PROCEDURE — 3079F DIAST BP 80-89 MM HG: CPT | Mod: CPTII,S$GLB,, | Performed by: PODIATRIST

## 2024-06-27 PROCEDURE — 3008F BODY MASS INDEX DOCD: CPT | Mod: CPTII,S$GLB,, | Performed by: PODIATRIST

## 2024-06-27 PROCEDURE — 3066F NEPHROPATHY DOC TX: CPT | Mod: CPTII,S$GLB,, | Performed by: PODIATRIST

## 2024-06-27 PROCEDURE — 3077F SYST BP >= 140 MM HG: CPT | Mod: CPTII,S$GLB,, | Performed by: PODIATRIST

## 2024-06-27 PROCEDURE — 1159F MED LIST DOCD IN RCRD: CPT | Mod: CPTII,S$GLB,, | Performed by: PODIATRIST

## 2024-06-27 PROCEDURE — 99999 PR PBB SHADOW E&M-EST. PATIENT-LVL III: CPT | Mod: PBBFAC,,, | Performed by: PODIATRIST

## 2024-06-27 PROCEDURE — 3044F HG A1C LEVEL LT 7.0%: CPT | Mod: CPTII,S$GLB,, | Performed by: PODIATRIST

## 2024-06-27 PROCEDURE — 99213 OFFICE O/P EST LOW 20 MIN: CPT | Mod: S$GLB,,, | Performed by: PODIATRIST

## 2024-06-27 NOTE — PROGRESS NOTES
CHW - Follow Up    Oliva Viramontes,  Community Health Worker completed a follow up visit with patient via telephone today. CHW spoke with Ms. Eagle at [facility] [phone], about obtaining rental and ort assistance for Mr. Mcfadden.  Pt was at dialysis and was able to talk for very long.   Community Health Worker provided: CHW scheduled appointment for Mr. Mcfadden for July 1 at 3:20 p.m.   Follow-up Outreach - Due: 7/1/2024

## 2024-06-28 ENCOUNTER — PATIENT MESSAGE (OUTPATIENT)
Dept: ADMINISTRATIVE | Facility: OTHER | Age: 59
End: 2024-06-28
Payer: COMMERCIAL

## 2024-06-28 ENCOUNTER — PATIENT OUTREACH (OUTPATIENT)
Dept: ADMINISTRATIVE | Facility: OTHER | Age: 59
End: 2024-06-28
Payer: COMMERCIAL

## 2024-06-28 NOTE — PROGRESS NOTES
CHW - Follow Up    Oliva Viramontes, Community Health Worker completed a follow up visit with patient via telephone today.  Community Health Worker provided: CHW reminded Mr. Mcfadden of his meeting with Marques Santana on July 1 at 3:00 p.m. at [address] [place] [address], [phone].     Follow-up Outreach - Due: 7/2/2024

## 2024-07-01 ENCOUNTER — PATIENT OUTREACH (OUTPATIENT)
Dept: ADMINISTRATIVE | Facility: OTHER | Age: 59
End: 2024-07-01
Payer: COMMERCIAL

## 2024-07-03 NOTE — PROGRESS NOTES
Subjective:      Patient ID: Catalino Mcfadden is a 59 y.o. male.    Chief Complaint: Diabetes Mellitus and Wound Check    Catalino is a 59 y.o. male who presents to the clinic for evaluation and treatment of high risk feet. Catalino has a past medical history of Cancer, Diabetes mellitus, type 2, Diabetic foot ulcer associated with diabetes mellitus due to underlying condition (07/16/2020), ESRD on hemodialysis, Hyperlipidemia, Hypertension, and Obese. Reports new onset left heel blister x several days after a sock rubbed on the left heel. Seen in ED on 11/7/22.     11/16/2022 returns to clinic for follow up of left heel wound.  Seen by my colleague last week, cultures obtained (NGTD) and patient placed on PO abx.  He has kept dressing intact.     11/23/22: F/u left heel ulceration. Presents with calamine coflex left foot.     11/30/22: F/u left heel ulceration. Patient reports going to work this week.     12/14/22: F/u left heel ulceration. Presents in calamine coflex wrap.     12/21/22: F/u left heel ulceration. Presents in calamine coflex wrap.     12/27/22: F/u left heel ulceration. Presents in calamine coflex wrap. No new pedal complaints, continues to work     01/04/23: F/u left heel ulceration. Presents in calamine coflex wrap. No new pedal complaints, continues to work.  Continues to have hiccups      01/11/23: F/u left heel ulceration. Presents in calamine coflex wrap which appears to have padding which shifted medially. No new pedal complaints, continues to work.  Continues to have hiccups    01/18/23: F/u left heel ulceration. Presents in intact calamine coflex wrap. No new pedal complaints.    2/8/23: F/u right foot ulceration. Presents in calamine coflex wrap.     02/15/23: F/u right ulceration. Presents in intact calamine coflex wrap. No new pedal complaints.    3/15/23: F/u right foot ulceration. Presents in calamine coflex wrap.     03/22/23: F/u right ulceration. Presents in intact calamine coflex wrap. No new  pedal complaints.    04/12/23: Returns to clinic for foot inspection following achieving status of healed to bilateral foot wounds.  He has been attempting to care for feet as instructed. 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 Present in NB tennis shoes.  States he became a vegan on 04/04/2023. No new pedal complaints.    06/21/2023 patient returns to clinic for evaluation and treatment of high-risk feet.  He is status post achieving the status of healed to wounds of both feet.  He has been attempting to care the feet as instructed.  He continues to work and attempts to protect the feet in supportive shoes to the best of his ability.  Patient continues to suffer from hiccups.  No new pedal complaints.    07/19/2023 patient returns to clinic for evaluation and treatment of high-risk feet.   He has been attempting to care the feet as instructed. No longer using offloading pads He continues to work and attempts to protect the feet in supportive shoes to the best of his ability.  Patient continues to suffer from hiccups.  No new pedal complaints.    08/23/2023 patient returns to clinic for evaluation treatment of high-risk feet.  He continues to care for feet as instructed.  Recently acquired a new pair of tennis shoes, Denzel 1.  Continues to work for relates he may be looking for new employment in the coming weeks.  Patient relates a new symptom which is loss of balance or feeling like he is losing his equilibrium with walking and with rising.  He denies falls.  He denies nausea, vomiting, fever, chills.  He continues to have hiccups.    11/15/2023 patient returns to clinic for evaluation treatment of high-risk feet.  He continues to care for feet as instructed.   Patient relates that he has become virtually sedentary.  He no longer is employed.  He is living with a friend in [place].  He continues to feel a loss of balance and now also feels weakness to the lower  extremities.  He denies falls.  He denies nausea, vomiting, fever, chills.  He continues to have hiccups.      02/28/2024 patient returns to clinic for evaluation treatment of high-risk feet.  He continues to care for feet as instructed.  Since our last encounter, patient relates that he has now become houseless with most of his belongings being in storage and currently residing in a hotel temporarily.  Patient has plans to return to his hometown of [place] when able.  States that because many of his belongings are in storage he has been wearing the flat nonsupportive tennis shoe that he presents in today.  He states that when he had his feet checked and dialysis they noted increased callus formation to his left lateral foot which was an area previous chronic ulceration and osteomyelitis.  He denies seeing any drainage.  He denies pain secondary to neuropathy.  He has yet to find employment due to consistently failing physical exams.  He continues to feel a loss of balance and now also feels weakness to the lower extremities.  He denies falls.  He continues to have hiccups.    06/04/2024 patient returns to clinic for evaluation treatment of high-risk feet.  Patient relates that he now resides in a small room in [place].   States that because many of his belongings are in storage he continues to wear flat nonsupportive tennis shoes.  He denies pain secondary to neuropathy.  He continues to feel a loss of balance and now also feels weakness to the lower extremities.  He denies falls.  He continues to have hiccups.      06/27/2024 patient returns to clinic for evaluation treatment of high-risk feet.  He has been attempting to care for pre ulcerative lesions as instructed and relates subjective improvement. He denies pain secondary to neuropathy.  No new pedal complaints      Chief Complaint   Patient presents with    Diabetes Mellitus    Wound Check         Hemoglobin A1C   Date Value Ref Range Status   05/06/2024  "4.1 4.0 - 5.6 % Final     Comment:     ADA Screening Guidelines:  5.7-6.4%  Consistent with prediabetes  >or=6.5%  Consistent with diabetes    High levels of fetal hemoglobin interfere with the HbA1C  assay. Heterozygous hemoglobin variants (HbS, HgC, etc)do  not significantly interfere with this assay.   However, presence of multiple variants may affect accuracy.     02/22/2023 5.1 4.0 - 5.6 % Final     Comment:     ADA Screening Guidelines:  5.7-6.4%  Consistent with prediabetes  >or=6.5%  Consistent with diabetes    High levels of fetal hemoglobin interfere with the HbA1C  assay. Heterozygous hemoglobin variants (HbS, HgC, etc)do  not significantly interfere with this assay.   However, presence of multiple variants may affect accuracy.     02/23/2022 5.0 4.0 - 5.6 % Final     Comment:     ADA Screening Guidelines:  5.7-6.4%  Consistent with prediabetes  >or=6.5%  Consistent with diabetes    High levels of fetal hemoglobin interfere with the HbA1C  assay. Heterozygous hemoglobin variants (HbS, HgC, etc)do  not significantly interfere with this assay.   However, presence of multiple variants may affect accuracy.         Review of Systems   Constitutional: Positive for weight loss. Negative for chills.   Cardiovascular:  Negative for chest pain and claudication.   Respiratory:  Negative for cough.    Skin:  Positive for color change, dry skin and nail changes.   Musculoskeletal:  Positive for joint pain, muscle weakness and stiffness.   Gastrointestinal:  Negative for nausea.   Neurological:  Positive for disturbances in coordination, loss of balance, numbness and paresthesias.   Psychiatric/Behavioral:  The patient is not nervous/anxious.            Objective:      Vitals:    06/27/24 0949   BP: (!) 178/84   Pulse: 82   Weight: 88.1 kg (194 lb 3.6 oz)   Height: 6' 1" (1.854 m)             Physical Exam  Vitals and nursing note reviewed.   Constitutional:       General: He is not in acute distress.     Appearance: " He is not toxic-appearing or diaphoretic.      Comments: Pt. is well-developed, well-nourished, appears stated age, in no acute distress, alert and oriented x 3. No evidence of depression, anxiety, or agitation. Calm, cooperative, and communicative. Appropriate interactions and affect.   Cardiovascular:      Pulses:           Dorsalis pedis pulses are 2+ on the right side and 2+ on the left side.        Posterior tibial pulses are 1+ on the right side and 1+ on the left side.      Comments: There is decreased digital hair. Skin is atrophic, slightly hyperpigmented  Pulmonary:      Effort: No respiratory distress.   Musculoskeletal:      Right lower leg: Edema present.      Left lower leg: Edema present.      Right ankle: Swelling present. No tenderness. No lateral malleolus, medial malleolus, AITF ligament, CF ligament or posterior TF ligament tenderness.      Right Achilles Tendon: No defects. Hilliard's test negative.      Left ankle: Swelling present. No tenderness. No lateral malleolus, medial malleolus, AITF ligament, CF ligament or posterior TF ligament tenderness.      Left Achilles Tendon: No defects. Hilliard's test negative.      Right foot: No tenderness or bony tenderness.      Left foot: No tenderness or bony tenderness.      Comments: Decreased stride, station of gait.  apropulsive toe off.  Increased angle and base of gait.    There is equinus deformity bilateral with decreased dorsiflexion at the ankle joint bilateral. No tenderness with compression of heel. Negative tinels sign. Gait analysis reveals excessive pronation through midstance and propulsion with early heel off.     Patient has hammertoes of digits 2-5 bilateral partially reducible     Decreased first MPJ range of motion both weightbearing and nonweightbearing, no crepitus observed the first MP joint, + dorsal flag sign.     Visible and palpable bunion without pain at dorsomedial 1st metatarsal head right and left.  Hallux abducted right  and left partially reducible, tracks laterally without being track bound.  No ecchymosis, erythema, edema, or cardinal signs infection or signs of trauma same foot.    Fat pad atrophy to heels and met heads bilateral    Plantarflexed 1st ray RLE   Lymphadenopathy:      Comments: No lymphatic streaking    Negative lymphadenopathy bilateral popliteal fossa and tarsal tunnel.     Skin:     General: Skin is warm and dry.      Coloration: Skin is not pale.      Findings: Lesion (see wound descriptions below) present. No ecchymosis, laceration or rash.      Nails: There is no clubbing.      Comments: Toenails 1-5 bilaterally discolored/yellowed, dystrophic, brittle with subungual debris.   Superficial abrasion left dorsal lateral foot.    Focal hyperkeratotic lesion consisting entirely of hyperkeratotic tissue without open skin, drainage, pus, fluctuance, malodor, or signs of infection:  left plantar foot.       Neurological:      Sensory: Sensory deficit present.      Comments:  [place] 5.07 monofilamant testing is diminished Kp feet. Decreased/absent vibratory sensation bilateral feet to 128Hz tuning fork.     Paresthesias, and hyperesthesia bilateral feet with no clearly identified trigger or source.           Psychiatric:         Attention and Perception: He is attentive.         Mood and Affect: Mood is depressed. Mood is not anxious. Affect is not inappropriate.         Speech: He is communicative. Speech is not slurred.         Behavior: Behavior is not combative.       06/27/2024 06/04/2024 02/28/2024    Pre ulcerative callus to left lateral midfoot      Previous site of left medial heel wound        11/15/2023                      08/23/2023     Left foot                Right foot              07/19/2023 06/21/2023 04/12/23:                    Assessment:       Encounter Diagnoses   Name Primary?    Type 2 diabetes mellitus with stage 5  chronic kidney disease Yes    Pre-ulcerative calluses     History of diabetic ulcer of foot                      Plan:       Catalino was seen today for diabetes mellitus and wound check.    Diagnoses and all orders for this visit:    Type 2 diabetes mellitus with stage 5 chronic kidney disease    Pre-ulcerative calluses    History of diabetic ulcer of foot        I counseled the patient on his conditions, their implications and medical management.    Education about the prevention of limb loss.    Discussed wound healing cycle, skin integrity, ways to care for skin.Counseled patient on the effects of biomechanical pressure on healing. He verbalizes understanding that it can increase the chances of delayed healing and this prolonged exposure leads to infection or progression of infection which subsequently can result in loss of limb.    Adequate vitamin supplementation, protein intake, and hydration - discussed with patient    All wounds have remained healed, pre ulcerative sites improved.  Skin is still delicate therefore patient must be diligent in avoiding excessive pressure and making sure there is adequate support and padding in shoe gear.     I am concerned about his living situation given his multiple comorbidities.  Patient is a  and hopefully we will be able to get some assistance long-term as well as disability since he has been unable to get employment secondary to his multiple comorbidities    Follow-up: 4-6  weeks but should call [facility] immediately if any signs of infection, such as fever, chills, sweats, increased redness or pain.    Short-term goals include maintaining good offloading and minimizing bioburden, promoting granulation and epithelialization to healing.  Long-term goals include keeping the wound healed by good offloading and medical management under the direction of internist.    Shoe inspection. Diabetic Foot Education. Patient reminded of the importance of good nutrition and blood  sugar control to help prevent podiatric complications of diabetes. Patient instructed on proper foot hygeine. We discussed wearing proper shoe gear, daily foot inspections, never walking without protective shoe gear, never putting sharp instruments to feet.

## 2024-07-03 NOTE — PROGRESS NOTES
CHW - Follow Up    Oliva Viramontes, Community Health Worker completed a follow up visit with patient via telephone today.  Pt/Caregiver reported: Mr. Mcfadden stated that he is going to [place] to visit his mother and will be back in [place] on July 25 for eye surgery.   Pt also stated [facility] with the [facility] regarding [facility] and signed the paperwork with a Mr. Abramsan.   Follow-up Outreach - Due: 7/29/2024

## 2024-07-05 DIAGNOSIS — R59.1 DIFFUSE LYMPHADENOPATHY: Primary | ICD-10-CM

## 2024-07-08 ENCOUNTER — OFFICE VISIT (OUTPATIENT)
Dept: INTERNAL MEDICINE | Facility: CLINIC | Age: 59
End: 2024-07-08
Payer: COMMERCIAL

## 2024-07-08 VITALS
HEIGHT: 73 IN | DIASTOLIC BLOOD PRESSURE: 70 MMHG | BODY MASS INDEX: 25.56 KG/M2 | OXYGEN SATURATION: 100 % | SYSTOLIC BLOOD PRESSURE: 138 MMHG | HEART RATE: 67 BPM | WEIGHT: 192.88 LBS

## 2024-07-08 DIAGNOSIS — R60.0 BILATERAL LOWER EXTREMITY EDEMA: ICD-10-CM

## 2024-07-08 DIAGNOSIS — E11.22 TYPE 2 DIABETES MELLITUS WITH STAGE 5 CHRONIC KIDNEY DISEASE: ICD-10-CM

## 2024-07-08 DIAGNOSIS — N18.5 TYPE 2 DIABETES MELLITUS WITH STAGE 5 CHRONIC KIDNEY DISEASE: ICD-10-CM

## 2024-07-08 DIAGNOSIS — Z09 FOLLOW-UP EXAM: Primary | ICD-10-CM

## 2024-07-08 DIAGNOSIS — R59.1 DIFFUSE LYMPHADENOPATHY: ICD-10-CM

## 2024-07-08 DIAGNOSIS — N18.6 ESRD ON HEMODIALYSIS: ICD-10-CM

## 2024-07-08 DIAGNOSIS — Z99.2 ESRD ON HEMODIALYSIS: ICD-10-CM

## 2024-07-08 DIAGNOSIS — R06.6 INTRACTABLE HICCUPS: ICD-10-CM

## 2024-07-08 PROCEDURE — 3044F HG A1C LEVEL LT 7.0%: CPT | Mod: CPTII,S$GLB,, | Performed by: INTERNAL MEDICINE

## 2024-07-08 PROCEDURE — 3078F DIAST BP <80 MM HG: CPT | Mod: CPTII,S$GLB,, | Performed by: INTERNAL MEDICINE

## 2024-07-08 PROCEDURE — 99999 PR PBB SHADOW E&M-EST. PATIENT-LVL III: CPT | Mod: PBBFAC,,, | Performed by: INTERNAL MEDICINE

## 2024-07-08 PROCEDURE — 3066F NEPHROPATHY DOC TX: CPT | Mod: CPTII,S$GLB,, | Performed by: INTERNAL MEDICINE

## 2024-07-08 PROCEDURE — 3075F SYST BP GE 130 - 139MM HG: CPT | Mod: CPTII,S$GLB,, | Performed by: INTERNAL MEDICINE

## 2024-07-08 PROCEDURE — 99214 OFFICE O/P EST MOD 30 MIN: CPT | Mod: S$GLB,,, | Performed by: INTERNAL MEDICINE

## 2024-07-08 PROCEDURE — 3008F BODY MASS INDEX DOCD: CPT | Mod: CPTII,S$GLB,, | Performed by: INTERNAL MEDICINE

## 2024-07-08 PROCEDURE — 1159F MED LIST DOCD IN RCRD: CPT | Mod: CPTII,S$GLB,, | Performed by: INTERNAL MEDICINE

## 2024-07-08 NOTE — PROGRESS NOTES
Subjective:       Patient ID: Catalino Mcfadden is a 59 y.o. male.    Chief Complaint: Establish Care      HPI  Catalino Mcfadden is a 59 y.o. year old male with ESRD on HD, t2DM, HTN, diffuse metastatic disease of unknown primary vs inflammatory /infectious process. Being followed by multiple speciality clinics including heme/onc, infectious disease, pulmonary, gastroenterology. Was unable to make neurology appointment for evaluation of bilateral lower extremity weakness. Has appointment with ophthalmology in two weeks. Is scheduled for repeat c-scope as well as bone marrow biopsy.     Goes to [facility] T/Th/Sat - patient travelling to [place], [place] this weekend to visit his mother. Is scheduled for dialysis at [place] location. Is planning to be there for 1-2 weeks.     Dialysis catheter removed two weeks ago. Using left AV graft now.     Working with case management and  at dialysis center to obtain SSI/disability.     Review of Systems   Constitutional:  Negative for activity change, appetite change, chills, fatigue, fever and unexpected weight change.   HENT:  Negative for congestion, rhinorrhea and sore throat.    Eyes:  Negative for visual disturbance.   Respiratory:  Negative for shortness of breath.    Cardiovascular:  Negative for chest pain.   Gastrointestinal:  Negative for abdominal pain, diarrhea, nausea and vomiting.   Genitourinary:  Negative for difficulty urinating and dysuria.   Musculoskeletal:  Negative for arthralgias, back pain and myalgias.   Skin:  Negative for color change and rash.   Neurological:  Positive for weakness. Negative for dizziness and headaches.         Past Medical History:   Diagnosis Date    Cancer     Diabetes mellitus, type 2     Diabetic foot ulcer associated with diabetes mellitus due to underlying condition 07/16/2020    ESRD on hemodialysis     Hyperlipidemia     Hypertension     Obese         Prior to Admission medications    Medication Sig Start Date End Date Taking?  "Authorizing Provider   hydrALAZINE (APRESOLINE) 100 MG tablet Take 1 tablet (100 mg total) by mouth 3 (three) times daily. 3/28/24 3/28/25 Yes Mary Jo Villatoro MD   NIFEdipine (PROCARDIA XL) 90 MG (OSM) 24 hr tablet Take 1 tablet (90 mg total) by mouth once daily. 6/12/24 6/12/25 Yes Samreen Salvador PA-C        Past medical history, surgical history, and family medical history reviewed and updated as appropriate.    Medications and allergies reviewed.     Objective:          Vitals:    07/08/24 1335 07/08/24 1409   BP: (!) 140/70 138/70   BP Location: Right arm    Patient Position: Sitting    Pulse: 67    SpO2: 100%    Weight: 87.5 kg (192 lb 14.4 oz)    Height: 6' 1" (1.854 m)      Body mass index is 25.45 kg/m².  Physical Exam  Constitutional:       General: He is not in acute distress.     Appearance: He is well-developed.   HENT:      Head: Normocephalic and atraumatic.      Nose: Nose normal.   Eyes:      General: No scleral icterus.     Extraocular Movements: Extraocular movements intact.   Neck:      Thyroid: No thyromegaly.      Vascular: No JVD.      Trachea: No tracheal deviation.   Cardiovascular:      Rate and Rhythm: Normal rate and regular rhythm.      Heart sounds: Normal heart sounds. No murmur heard.     No friction rub. No gallop.   Pulmonary:      Effort: Pulmonary effort is normal. No respiratory distress.      Breath sounds: Normal breath sounds. No wheezing or rales.   Abdominal:      General: Bowel sounds are normal. There is no distension.      Palpations: Abdomen is soft. There is no mass.      Tenderness: There is no abdominal tenderness.   Musculoskeletal:         General: No tenderness. Normal range of motion.      Cervical back: Normal range of motion and neck supple.      Right lower leg: Edema (1+ b/l below level of shins) present.      Left lower leg: Edema (1+ b/l below level of shins) present.   Lymphadenopathy:      Cervical: No cervical adenopathy.   Skin:     General: Skin " is warm and dry.      Findings: No rash.   Neurological:      Mental Status: He is alert and oriented to person, place, and time.      Cranial Nerves: No cranial nerve deficit.      Deep Tendon Reflexes: Reflexes normal.   Psychiatric:         Behavior: Behavior normal.         Lab Results   Component Value Date    WBC 4.60 05/22/2024    HGB 10.8 (L) 05/22/2024    HCT 33.0 (L) 05/22/2024     (L) 05/22/2024    CHOL 217 (H) 05/06/2024    TRIG 54 05/06/2024    HDL 71 05/06/2024    ALT 31 05/22/2024    AST 51 (H) 05/22/2024     (L) 05/29/2024    K 3.8 05/29/2024    CL 95 05/29/2024    CREATININE 3.3 (H) 05/29/2024    BUN 15 05/29/2024    CO2 27 05/29/2024    TSH 1.736 05/06/2024    PSA 1.6 02/23/2022    INR 1.1 02/26/2024    HGBA1C 4.1 05/06/2024       Assessment:       1. Follow-up exam    2. ESRD on hemodialysis    3. Intractable hiccups    4. Diffuse lymphadenopathy    5. Bilateral lower extremity edema    6. Type 2 diabetes mellitus with stage 5 chronic kidney disease          Plan:     Catalino was seen today for establish care.    Diagnoses and all orders for this visit:    Follow-up exam    ESRD on hemodialysis    Intractable hiccups    Diffuse lymphadenopathy    Bilateral lower extremity edema  Comments:  last 2d echo 2021, normal EF; ESRD on HD    Type 2 diabetes mellitus with stage 5 chronic kidney disease  Comments:  last a1c 4.1    Gained two pounds since last visit  Working with case management and  at dialysis unit.  [facility]  Apparently looking for workman's comp from incident which occurred in 2017.     CDL taken away around 2020 when he started on hemodialysis.     Patient to reschedule CT C/A/P since he missed his last scan.   Has upcoming colonscopy and neurology appt.     F/u with heme/onc based on findings on CT imaging.     Health maintenance reviewed with patient.    Follow up in about 3 months (around 10/8/2024).    Chauncey  MD Chris  Internal Medicine / Primary Care  [facility]  7/8/2024

## 2024-07-08 NOTE — PATIENT INSTRUCTIONS
Reschedule your CT scan of chest / abdomen / pelvis with contrast for Dr. Mendoza.  [facility] with [facility].  Return to clinic in 3 months

## 2024-07-16 ENCOUNTER — TELEPHONE (OUTPATIENT)
Dept: ENDOSCOPY | Facility: HOSPITAL | Age: 59
End: 2024-07-16
Payer: COMMERCIAL

## 2024-07-16 NOTE — TELEPHONE ENCOUNTER
Called pt for pre call to confirm colonoscopy with aes on 7/22/24. Pt stated would be out of town until 7/24/24. Informed pt he was removed from schedule and someone would contact him to re-schedule pt was difficult to understand.message sent to aes schedulers in basket.

## 2024-07-19 ENCOUNTER — TELEPHONE (OUTPATIENT)
Dept: ENDOSCOPY | Facility: HOSPITAL | Age: 59
End: 2024-07-19
Payer: COMMERCIAL

## 2024-07-19 ENCOUNTER — PATIENT MESSAGE (OUTPATIENT)
Dept: HEMATOLOGY/ONCOLOGY | Facility: CLINIC | Age: 59
End: 2024-07-19
Payer: COMMERCIAL

## 2024-07-19 ENCOUNTER — PATIENT MESSAGE (OUTPATIENT)
Dept: ENDOSCOPY | Facility: HOSPITAL | Age: 59
End: 2024-07-19
Payer: COMMERCIAL

## 2024-07-19 DIAGNOSIS — K63.5 POLYP OF COLON, UNSPECIFIED PART OF COLON, UNSPECIFIED TYPE: Primary | ICD-10-CM

## 2024-07-19 RX ORDER — SODIUM, POTASSIUM,MAG SULFATES 17.5-3.13G
1 SOLUTION, RECONSTITUTED, ORAL ORAL DAILY
Qty: 1 KIT | Refills: 0 | Status: SHIPPED | OUTPATIENT
Start: 2024-07-19 | End: 2024-07-19

## 2024-07-19 RX ORDER — SODIUM, POTASSIUM,MAG SULFATES 17.5-3.13G
1 SOLUTION, RECONSTITUTED, ORAL ORAL DAILY
Qty: 1 KIT | Refills: 0 | Status: SHIPPED | OUTPATIENT
Start: 2024-07-19 | End: 2024-07-21

## 2024-07-19 NOTE — TELEPHONE ENCOUNTER
Spoke to pt to schedule procedure(s) Colonoscopy       Physician to perform procedure(s) Dr. RADHA Quinonez  Date of Procedure (s) 9/9/24  Arrival Time 11:30 AM  Time of Procedure(s) 12:30 PM   Location of Procedure(s) [facility]  Type of Rx Prep sent to patient: Suprep  Instructions provided to patient via [facility]    Patient was informed on the following information and verbalized understanding. Screening questionnaire reviewed with patient and complete. If procedure requires anesthesia, a responsible adult needs to be present to accompany the patient home, patient cannot drive after receiving anesthesia. Appointment details are tentative, especially check-in time. Patient will receive a prep-op call 7 days prior to confirm check-in time for procedure. If applicable the patient should contact their pharmacy to verify Rx for procedure prep is ready for pick-up. Patient was advised to call the scheduling department at [phone] if pharmacy states no Rx is available. Patient was advised to call the endoscopy scheduling department if any questions or concerns arise.      [facility] Scheduling Department

## 2024-07-22 PROBLEM — K92.2 GI BLEED: Status: RESOLVED | Noted: 2024-04-15 | Resolved: 2024-07-22

## 2024-07-23 ENCOUNTER — TELEPHONE (OUTPATIENT)
Dept: HEMATOLOGY/ONCOLOGY | Facility: CLINIC | Age: 59
End: 2024-07-23
Payer: COMMERCIAL

## 2024-07-23 ENCOUNTER — TELEPHONE (OUTPATIENT)
Dept: ENDOSCOPY | Facility: HOSPITAL | Age: 59
End: 2024-07-23
Payer: COMMERCIAL

## 2024-07-23 NOTE — TELEPHONE ENCOUNTER
----- Message from Tamir Castillo RN sent at 5/27/2024  9:55 AM CDT -----    ----- Message -----  From: Brianna Mendoza MD  Sent: 5/26/2024   2:37 PM CDT  To: Tamir Castillo RN; [facility]  [facility]    Hi team,  Mr. Mcfadden is open to rescheduling the bone marrow biopsy last I spoke with him. I was hoping yall could fit him in next available. Will you let me know when scheduled so I can set up a follow up visit Thanks!

## 2024-07-23 NOTE — TELEPHONE ENCOUNTER
Called pt for pre call to confirm bone marrow biopsy for 7/31/24. Pt stated received message this procedure was going to be re-scheduled.pt removed from schedule. Message to heme-onc.

## 2024-07-24 ENCOUNTER — PATIENT OUTREACH (OUTPATIENT)
Dept: EMERGENCY MEDICINE | Facility: HOSPITAL | Age: 59
End: 2024-07-24
Payer: COMMERCIAL

## 2024-07-26 ENCOUNTER — PATIENT OUTREACH (OUTPATIENT)
Dept: ADMINISTRATIVE | Facility: OTHER | Age: 59
End: 2024-07-26
Payer: COMMERCIAL

## 2024-07-29 ENCOUNTER — HOSPITAL ENCOUNTER (OUTPATIENT)
Dept: RADIOLOGY | Facility: HOSPITAL | Age: 59
Discharge: HOME | End: 2024-07-29
Attending: INTERNAL MEDICINE
Payer: COMMERCIAL

## 2024-07-29 DIAGNOSIS — R59.1 DIFFUSE LYMPHADENOPATHY: ICD-10-CM

## 2024-07-29 PROCEDURE — A9698 NON-RAD CONTRAST MATERIALNOC: HCPCS | Performed by: INTERNAL MEDICINE

## 2024-07-29 PROCEDURE — 25500020 PHARM REV CODE 255: Performed by: INTERNAL MEDICINE

## 2024-07-29 PROCEDURE — 74177 CT ABD & PELVIS W/CONTRAST: CPT | Mod: 26,,, | Performed by: INTERNAL MEDICINE

## 2024-07-29 PROCEDURE — 71260 CT THORAX DX C+: CPT | Mod: TC

## 2024-07-29 PROCEDURE — 71260 CT THORAX DX C+: CPT | Mod: 26,,, | Performed by: INTERNAL MEDICINE

## 2024-07-29 PROCEDURE — 74177 CT ABD & PELVIS W/CONTRAST: CPT | Mod: TC

## 2024-07-29 RX ADMIN — BARIUM SULFATE 450 ML: 20 SUSPENSION ORAL at 03:07

## 2024-07-29 RX ADMIN — IOHEXOL 100 ML: 350 INJECTION, SOLUTION INTRAVENOUS at 03:07

## 2024-07-29 NOTE — PROGRESS NOTES
CHW - Outreach Attempt    Oliva Viramontes Community Health Worker left a voicemail message for 1st attempt to contact patient regarding: [facility]   Community Health Worker to attempt to contact patient on:  August 8, 2024

## 2024-07-30 ENCOUNTER — PATIENT OUTREACH (OUTPATIENT)
Dept: EMERGENCY MEDICINE | Facility: HOSPITAL | Age: 59
End: 2024-07-30
Payer: COMMERCIAL

## 2024-07-31 ENCOUNTER — TELEPHONE (OUTPATIENT)
Dept: PHARMACY | Facility: CLINIC | Age: 59
End: 2024-07-31
Payer: COMMERCIAL

## 2024-07-31 ENCOUNTER — TELEPHONE (OUTPATIENT)
Dept: HEMATOLOGY/ONCOLOGY | Facility: CLINIC | Age: 59
End: 2024-07-31
Payer: COMMERCIAL

## 2024-07-31 NOTE — TELEPHONE ENCOUNTER
I contacted the patient concerning a message for his medicaitons. Patient there are no programs available for his medications.  The patient was in dialysis and would like for me to call him back.

## 2024-07-31 NOTE — TELEPHONE ENCOUNTER
----- Message from Becky Zendejas sent at 7/31/2024  1:55 PM CDT -----  Regarding: Scheduling Request  Contact: Diann  Scheduling Request           Appt Type:  EP     Date/Time Preference:Any     Treating Provider:Stevan Chavezer Name:Diann     Contact Preference:[phone]     Comments/notes:Diann is calling to get patient schedule with his appts. Requesting a call back

## 2024-08-01 ENCOUNTER — OFFICE VISIT (OUTPATIENT)
Dept: PODIATRY | Facility: CLINIC | Age: 59
End: 2024-08-01
Payer: COMMERCIAL

## 2024-08-01 VITALS — WEIGHT: 192.88 LBS | BODY MASS INDEX: 25.56 KG/M2 | HEIGHT: 73 IN

## 2024-08-01 DIAGNOSIS — N18.5 TYPE 2 DIABETES MELLITUS WITH STAGE 5 CHRONIC KIDNEY DISEASE: Primary | ICD-10-CM

## 2024-08-01 DIAGNOSIS — M20.5X2 HALLUX LIMITUS, ACQUIRED, LEFT: ICD-10-CM

## 2024-08-01 DIAGNOSIS — E11.22 TYPE 2 DIABETES MELLITUS WITH STAGE 5 CHRONIC KIDNEY DISEASE: Primary | ICD-10-CM

## 2024-08-01 DIAGNOSIS — M20.5X1 HALLUX LIMITUS, ACQUIRED, RIGHT: ICD-10-CM

## 2024-08-01 DIAGNOSIS — R26.89 KEEPS LOSING BALANCE: ICD-10-CM

## 2024-08-01 DIAGNOSIS — L84 PRE-ULCERATIVE CALLUSES: ICD-10-CM

## 2024-08-01 DIAGNOSIS — Z86.31 HISTORY OF DIABETIC ULCER OF FOOT: ICD-10-CM

## 2024-08-01 DIAGNOSIS — L90.9 PLANTAR FAT PAD ATROPHY: ICD-10-CM

## 2024-08-01 PROCEDURE — 3008F BODY MASS INDEX DOCD: CPT | Mod: CPTII,S$GLB,, | Performed by: PODIATRIST

## 2024-08-01 PROCEDURE — 99999 PR PBB SHADOW E&M-EST. PATIENT-LVL III: CPT | Mod: PBBFAC,,, | Performed by: PODIATRIST

## 2024-08-01 PROCEDURE — 1160F RVW MEDS BY RX/DR IN RCRD: CPT | Mod: CPTII,S$GLB,, | Performed by: PODIATRIST

## 2024-08-01 PROCEDURE — 3044F HG A1C LEVEL LT 7.0%: CPT | Mod: CPTII,S$GLB,, | Performed by: PODIATRIST

## 2024-08-01 PROCEDURE — 99213 OFFICE O/P EST LOW 20 MIN: CPT | Mod: S$GLB,,, | Performed by: PODIATRIST

## 2024-08-01 PROCEDURE — 1159F MED LIST DOCD IN RCRD: CPT | Mod: CPTII,S$GLB,, | Performed by: PODIATRIST

## 2024-08-01 PROCEDURE — 3066F NEPHROPATHY DOC TX: CPT | Mod: CPTII,S$GLB,, | Performed by: PODIATRIST

## 2024-08-01 NOTE — PROGRESS NOTES
Subjective:      Patient ID: Catalino Mcfadden is a 59 y.o. male.    Chief Complaint: Diabetes Mellitus    Catalino is a 59 y.o. male who presents to the clinic for evaluation and treatment of high risk feet. Catalino has a past medical history of Cancer, Diabetes mellitus, type 2, Diabetic foot ulcer associated with diabetes mellitus due to underlying condition (07/16/2020), ESRD on hemodialysis, Hyperlipidemia, Hypertension, and Obese. Reports new onset left heel blister x several days after a sock rubbed on the left heel. Seen in ED on 11/7/22.     11/16/2022 returns to clinic for follow up of left heel wound.  Seen by my colleague last week, cultures obtained (NGTD) and patient placed on PO abx.  He has kept dressing intact.     11/23/22: F/u left heel ulceration. Presents with calamine coflex left foot.     11/30/22: F/u left heel ulceration. Patient reports going to work this week.     12/14/22: F/u left heel ulceration. Presents in calamine coflex wrap.     12/21/22: F/u left heel ulceration. Presents in calamine coflex wrap.     12/27/22: F/u left heel ulceration. Presents in calamine coflex wrap. No new pedal complaints, continues to work     01/04/23: F/u left heel ulceration. Presents in calamine coflex wrap. No new pedal complaints, continues to work.  Continues to have hiccups      01/11/23: F/u left heel ulceration. Presents in calamine coflex wrap which appears to have padding which shifted medially. No new pedal complaints, continues to work.  Continues to have hiccups    01/18/23: F/u left heel ulceration. Presents in intact calamine coflex wrap. No new pedal complaints.    2/8/23: F/u right foot ulceration. Presents in calamine coflex wrap.     02/15/23: F/u right ulceration. Presents in intact calamine coflex wrap. No new pedal complaints.    3/15/23: F/u right foot ulceration. Presents in calamine coflex wrap.     03/22/23: F/u right ulceration. Presents in intact calamine coflex wrap. No new pedal  complaints.    04/12/23: Returns to clinic for foot inspection following achieving status of healed to bilateral foot wounds.  He has been attempting to care for feet as instructed. 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 Present in NB tennis shoes.  States he became a vegan on 04/04/2023. No new pedal complaints.    06/21/2023 patient returns to clinic for evaluation and treatment of high-risk feet.  He is status post achieving the status of healed to wounds of both feet.  He has been attempting to care the feet as instructed.  He continues to work and attempts to protect the feet in supportive shoes to the best of his ability.  Patient continues to suffer from hiccups.  No new pedal complaints.    07/19/2023 patient returns to clinic for evaluation and treatment of high-risk feet.   He has been attempting to care the feet as instructed. No longer using offloading pads He continues to work and attempts to protect the feet in supportive shoes to the best of his ability.  Patient continues to suffer from hiccups.  No new pedal complaints.    08/23/2023 patient returns to clinic for evaluation treatment of high-risk feet.  He continues to care for feet as instructed.  Recently acquired a new pair of tennis shoes, Denzel 1.  Continues to work for relates he may be looking for new employment in the coming weeks.  Patient relates a new symptom which is loss of balance or feeling like he is losing his equilibrium with walking and with rising.  He denies falls.  He denies nausea, vomiting, fever, chills.  He continues to have hiccups.    11/15/2023 patient returns to clinic for evaluation treatment of high-risk feet.  He continues to care for feet as instructed.   Patient relates that he has become virtually sedentary.  He no longer is employed.  He is living with a friend in [place].  He continues to feel a loss of balance and now also feels weakness to the lower extremities.   He denies falls.  He denies nausea, vomiting, fever, chills.  He continues to have hiccups.      02/28/2024 patient returns to clinic for evaluation treatment of high-risk feet.  He continues to care for feet as instructed.  Since our last encounter, patient relates that he has now become houseless with most of his belongings being in storage and currently residing in a hotel temporarily.  Patient has plans to return to his hometown of [place] when able.  States that because many of his belongings are in storage he has been wearing the flat nonsupportive tennis shoe that he presents in today.  He states that when he had his feet checked and dialysis they noted increased callus formation to his left lateral foot which was an area previous chronic ulceration and osteomyelitis.  He denies seeing any drainage.  He denies pain secondary to neuropathy.  He has yet to find employment due to consistently failing physical exams.  He continues to feel a loss of balance and now also feels weakness to the lower extremities.  He denies falls.  He continues to have hiccups.    06/04/2024 patient returns to clinic for evaluation treatment of high-risk feet.  Patient relates that he now resides in a small room in [place].   States that because many of his belongings are in storage he continues to wear flat nonsupportive tennis shoes.  He denies pain secondary to neuropathy.  He continues to feel a loss of balance and now also feels weakness to the lower extremities.  He denies falls.  He continues to have hiccups.      06/27/2024 patient returns to clinic for evaluation treatment of high-risk feet.  He has been attempting to care for pre ulcerative lesions as instructed and relates subjective improvement. He denies pain secondary to neuropathy.  No new pedal complaints    08/01/2024 patient returns to clinic for evaluation treatment of high-risk feet.  He has been attempting to care for pre ulcerative lesions as instructed and  relates subjective improvement. He denies pain secondary to neuropathy.  No new pedal complaints. Present in [employer] boat shoes.    Chief Complaint   Patient presents with    Diabetes Mellitus         Hemoglobin A1C   Date Value Ref Range Status   05/06/2024 4.1 4.0 - 5.6 % Final     Comment:     ADA Screening Guidelines:  5.7-6.4%  Consistent with prediabetes  >or=6.5%  Consistent with diabetes    High levels of fetal hemoglobin interfere with the HbA1C  assay. Heterozygous hemoglobin variants (HbS, HgC, etc)do  not significantly interfere with this assay.   However, presence of multiple variants may affect accuracy.     02/22/2023 5.1 4.0 - 5.6 % Final     Comment:     ADA Screening Guidelines:  5.7-6.4%  Consistent with prediabetes  >or=6.5%  Consistent with diabetes    High levels of fetal hemoglobin interfere with the HbA1C  assay. Heterozygous hemoglobin variants (HbS, HgC, etc)do  not significantly interfere with this assay.   However, presence of multiple variants may affect accuracy.     02/23/2022 5.0 4.0 - 5.6 % Final     Comment:     ADA Screening Guidelines:  5.7-6.4%  Consistent with prediabetes  >or=6.5%  Consistent with diabetes    High levels of fetal hemoglobin interfere with the HbA1C  assay. Heterozygous hemoglobin variants (HbS, HgC, etc)do  not significantly interfere with this assay.   However, presence of multiple variants may affect accuracy.         Review of Systems   Constitutional: Positive for weight loss. Negative for chills.   Cardiovascular:  Negative for chest pain and claudication.   Respiratory:  Negative for cough.    Skin:  Positive for color change, dry skin and nail changes.   Musculoskeletal:  Positive for joint pain, muscle weakness and stiffness.   Gastrointestinal:  Negative for nausea.   Neurological:  Positive for disturbances in coordination, loss of balance, numbness and paresthesias.   Psychiatric/Behavioral:  The patient is not nervous/anxious.            Objective:  "     Vitals:    08/01/24 0942   Weight: 87.5 kg (192 lb 14.4 oz)   Height: 6' 1" (1.854 m)   PainSc: 0-No pain       Physical Exam  Vitals and nursing note reviewed.   Constitutional:       General: He is not in acute distress.     Appearance: He is not toxic-appearing or diaphoretic.      Comments: Pt. is well-developed, well-nourished, appears stated age, in no acute distress, alert and oriented x 3. No evidence of depression, anxiety, or agitation. Calm, cooperative, and communicative. Appropriate interactions and affect.   Cardiovascular:      Pulses:           Dorsalis pedis pulses are 2+ on the right side and 2+ on the left side.        Posterior tibial pulses are 1+ on the right side and 1+ on the left side.      Comments: There is decreased digital hair. Skin is atrophic, slightly hyperpigmented  Pulmonary:      Effort: No respiratory distress.   Musculoskeletal:      Right lower leg: Edema present.      Left lower leg: Edema present.      Right ankle: Swelling present. No tenderness. No lateral malleolus, medial malleolus, AITF ligament, CF ligament or posterior TF ligament tenderness.      Right Achilles Tendon: No defects. Hilliard's test negative.      Left ankle: Swelling present. No tenderness. No lateral malleolus, medial malleolus, AITF ligament, CF ligament or posterior TF ligament tenderness.      Left Achilles Tendon: No defects. Hilliard's test negative.      Right foot: No tenderness or bony tenderness.      Left foot: No tenderness or bony tenderness.      Comments: Decreased stride, station of gait.  apropulsive toe off.  Increased angle and base of gait.    There is equinus deformity bilateral with decreased dorsiflexion at the ankle joint bilateral. No tenderness with compression of heel. Negative tinels sign. Gait analysis reveals excessive pronation through midstance and propulsion with early heel off.     Patient has hammertoes of digits 2-5 bilateral partially reducible     Decreased " first MPJ range of motion both weightbearing and nonweightbearing, no crepitus observed the first MP joint, + dorsal flag sign.     Visible and palpable bunion without pain at dorsomedial 1st metatarsal head right and left.  Hallux abducted right and left partially reducible, tracks laterally without being track bound.  No ecchymosis, erythema, edema, or cardinal signs infection or signs of trauma same foot.    Fat pad atrophy to heels and met heads bilateral    Plantarflexed 1st ray RLE   Lymphadenopathy:      Comments: No lymphatic streaking    Negative lymphadenopathy bilateral popliteal fossa and tarsal tunnel.     Skin:     General: Skin is warm and dry.      Coloration: Skin is not pale.      Findings: Lesion (see wound descriptions below) present. No ecchymosis, laceration or rash.      Nails: There is no clubbing.      Comments: Toenails 1-5 bilaterally discolored/yellowed, dystrophic, brittle with subungual debris.   Superficial abrasion left dorsal lateral foot.    Focal hyperkeratotic lesion consisting entirely of hyperkeratotic tissue without open skin, drainage, pus, fluctuance, malodor, or signs of infection:  left plantar foot.       Neurological:      Sensory: Sensory deficit present.      Comments:  [place] 5.07 monofilamant testing is diminished Kp feet. Decreased/absent vibratory sensation bilateral feet to 128Hz tuning fork.     Paresthesias, and hyperesthesia bilateral feet with no clearly identified trigger or source.           Psychiatric:         Attention and Perception: He is attentive.         Mood and Affect: Mood is depressed. Mood is not anxious. Affect is not inappropriate.         Speech: He is communicative. Speech is not slurred.         Behavior: Behavior is not combative.       08/01/2024 06/27/2024 06/04/2024 02/28/2024    Pre ulcerative callus to left lateral midfoot      Previous site of left medial heel  wound        11/15/2023                      08/23/2023     Left foot                Right foot              07/19/2023 06/21/2023 04/12/23:                    Assessment:       Encounter Diagnoses   Name Primary?    Type 2 diabetes mellitus with stage 5 chronic kidney disease Yes    Pre-ulcerative calluses     History of diabetic ulcer of foot     Hallux limitus, acquired, right     Hallux limitus, acquired, left     Plantar fat pad atrophy     Keeps losing balance                        Plan:       Catalino was seen today for diabetes mellitus.    Diagnoses and all orders for this visit:    Type 2 diabetes mellitus with stage 5 chronic kidney disease    Pre-ulcerative calluses    History of diabetic ulcer of foot    Hallux limitus, acquired, right    Hallux limitus, acquired, left    Plantar fat pad atrophy    Keeps losing balance          I counseled the patient on his conditions, their implications and medical management.    Education about the prevention of limb loss.    Discussed wound healing cycle, skin integrity, ways to care for skin.Counseled patient on the effects of biomechanical pressure on healing. He verbalizes understanding that it can increase the chances of delayed healing and this prolonged exposure leads to infection or progression of infection which subsequently can result in loss of limb.    Adequate vitamin supplementation, protein intake, and hydration - discussed with patient    All wounds have remained healed, pre ulcerative sites improved.  Skin is still delicate therefore patient must be diligent in avoiding excessive pressure and making sure there is adequate support and padding in shoe gear.     Follow-up: 7-10  weeks but should call [facility] immediately if any signs of infection, such as fever, chills, sweats, increased redness or pain.    Long-term goals include keeping the wound healed by good offloading and medical management under the direction of  internist.    Shoe inspection. Diabetic Foot Education. Patient reminded of the importance of good nutrition and blood sugar control to help prevent podiatric complications of diabetes. Patient instructed on proper foot hygeine. We discussed wearing proper shoe gear, daily foot inspections, never walking without protective shoe gear, never putting sharp instruments to feet.

## 2024-08-02 ENCOUNTER — PATIENT OUTREACH (OUTPATIENT)
Dept: ADMINISTRATIVE | Facility: OTHER | Age: 59
End: 2024-08-02
Payer: COMMERCIAL

## 2024-08-02 NOTE — PROGRESS NOTES
CHW - Follow Up    Oliva Viramontes, Community Health Worker completed a follow up visit with patient via telephone today.  Pt/Caregiver reported: Mr. Mcfadden stated that he sent CHW Ms. Bridges's telephone number at the [facility] to discuss his housing situation.   Community Health Worker provided:  CHW asked Mr. Mcfadden did she have him sign a release to discuss his housing situation.  Mr. Mcfadden stated no, but he will send CHW an Epic message with her phone number.   Follow-up Outreach - Due: 8/8/2024

## 2024-08-08 ENCOUNTER — TELEPHONE (OUTPATIENT)
Dept: HEMATOLOGY/ONCOLOGY | Facility: CLINIC | Age: 59
End: 2024-08-08
Payer: COMMERCIAL

## 2024-08-09 ENCOUNTER — PATIENT OUTREACH (OUTPATIENT)
Dept: ADMINISTRATIVE | Facility: OTHER | Age: 59
End: 2024-08-09
Payer: COMMERCIAL

## 2024-08-16 NOTE — PROGRESS NOTES
CHW - Follow Up    Oliva Viramontes, Community Health Worker completed a follow up visit with patient via telephone today.  Pt/Caregiver reported:  Mr. Mcfadden stated he still has hiccups and wish they would go away.  CHW stated she will mention this to Dr. Perez to follow up with Mr. Mcfadden.  Pt stated that he has diaylsis on tomorrow and other wise he is ok.   Community Health Worker provided: CHW will follow up with Pt next week.     Follow-up Outreach - Due: 8/22/2024

## 2024-08-22 ENCOUNTER — PATIENT OUTREACH (OUTPATIENT)
Dept: ADMINISTRATIVE | Facility: OTHER | Age: 59
End: 2024-08-22
Payer: COMMERCIAL

## 2024-08-22 NOTE — PROGRESS NOTES
CHW - Follow Up    Oliva Viramontes, Community Health Worker completed a follow up visit with patient via telephone today.  Pt/Caregiver reported: Mr. Mcfadden stated that he needs to see a dentist.     Community Health Worker provided: CHW contacted [facility] at [phone], they accept his insurance and is 1 block from where Mr. Mcfadden lives.   Follow-up Outreach - Due: 8/30/2024

## 2024-08-22 NOTE — PROGRESS NOTES
"[facility]    CC: hiccups    HPI 59 y.o. malewith ESRD on HD, DM2, Gastroparesis, imaging concerning for metastatic disease who presents for follow up of chronic, daily, bothersome hiccups with associated dry heaves.  He has tried and failed Protonix, omeprazole, Nexium, Zofran, Phenergan, baclofen, gabapentin, reglan, thorazine, Imipramine at bedtime, bethanechol 40 mg before meals t.I.d. (for gastroparesis - caused drooling), TCA with elavil 10 mg (for gastroparesis), various home remedies, gastroparesis diet and diaphragmatic breathing.     His hiccups persist.  He is not interested in re trying any of the previous medications.    Once he falls asleep he is able to sleep without hiccups.  His hiccups do keep him from falling asleep though.    Sprite and milk sometimes help for short periods of time.    Interval History 08/23/2024:  States that he has tried everything for his hiccups from breathing techniques to medications (see above)  Also saw an herbalist who told him heavy metals in the covid vaccine caused this and he should try chlorella and spirulina  He had a stellate ganglion block in February without improvement  He does have gallstones incidentally found on CT scan      Past Medical History  Past Medical History:   Diagnosis Date    Cancer     Diabetes mellitus, type 2     Diabetic foot ulcer associated with diabetes mellitus due to underlying condition 07/16/2020    ESRD on hemodialysis     Hyperlipidemia     Hypertension     Obese        Physical Examination  BP (!) 152/82   Pulse 90   Ht 6' 1" (1.854 m)   Wt 84.1 kg (185 lb 6.5 oz)   BMI 24.46 kg/m²   General appearance: alert, cooperative, no distress  HENT: Normocephalic, atraumatic, neck symmetrical, no nasal discharge   Abdomen: soft, non-tender; non distended, no rebound or guarding  Neurologic: Alert and oriented X 3, moving all four extremities, intact sensation to light touch    Labs:  Lab Results   Component Value Date "    WBC 4.60 05/22/2024    HGB 10.8 (L) 05/22/2024    HCT 33.0 (L) 05/22/2024    MCV 89 05/22/2024     (L) 05/22/2024         CMP  Sodium   Date Value Ref Range Status   05/29/2024 132 (L) 136 - 145 mmol/L Final     Potassium   Date Value Ref Range Status   05/29/2024 3.8 3.5 - 5.1 mmol/L Final     Chloride   Date Value Ref Range Status   05/29/2024 95 95 - 110 mmol/L Final     CO2   Date Value Ref Range Status   05/29/2024 27 23 - 29 mmol/L Final     Glucose   Date Value Ref Range Status   05/29/2024 88 70 - 110 mg/dL Final     BUN   Date Value Ref Range Status   05/29/2024 15 6 - 20 mg/dL Final     Creatinine   Date Value Ref Range Status   05/29/2024 3.3 (H) 0.5 - 1.4 mg/dL Final     Calcium   Date Value Ref Range Status   05/29/2024 8.3 (L) 8.7 - 10.5 mg/dL Final     Total Protein   Date Value Ref Range Status   05/22/2024 7.6 6.0 - 8.4 g/dL Final     Albumin   Date Value Ref Range Status   05/22/2024 2.8 (L) 3.5 - 5.2 g/dL Final     Total Bilirubin   Date Value Ref Range Status   05/22/2024 1.2 (H) 0.1 - 1.0 mg/dL Final     Comment:     For infants and newborns, interpretation of results should be based  on gestational age, weight and in agreement with clinical  observations.    Premature Infant recommended reference ranges:  Up to 24 hours.............<8.0 mg/dL  Up to 48 hours............<12.0 mg/dL  3-5 days..................<15.0 mg/dL  6-29 days.................<15.0 mg/dL       Alkaline Phosphatase   Date Value Ref Range Status   05/22/2024 798 (H) 55 - 135 U/L Final     AST   Date Value Ref Range Status   05/22/2024 51 (H) 10 - 40 U/L Final     ALT   Date Value Ref Range Status   05/22/2024 31 10 - 44 U/L Final     Anion Gap   Date Value Ref Range Status   05/29/2024 10 8 - 16 mmol/L Final     eGFR   Date Value Ref Range Status   05/29/2024 20.6 (A) >60 mL/min/1.73 m^2 Final         Assessment:   The patient is a 60 yo man with with medically intractable hiccups.      1. Intractable hiccups         2. Gallstones              Plan:  - Baclofen 2.5 mg qd + zyprexa 2.5mg qhs: https://pubmed.ncbi.nlm.nih.gov/37366658/  - He does not appear to have had a response to stellate ganglion block. Can inquire if phrenic nerve blockade may be a possibility with pain management: https://journals.lww.com/ajg/fulltext/2021/59459/q8499_brvzvrrbtyg_zgicxtw_ulzkaxn_lact_vpypvwy.2188.aspx  - Finally gallstones can rarely be associated with hiccups. He is not an ideal surgical candidate with dialysis history, but this would be a last resort.    Holger Crane MD

## 2024-08-23 ENCOUNTER — OFFICE VISIT (OUTPATIENT)
Dept: GASTROENTEROLOGY | Facility: CLINIC | Age: 59
End: 2024-08-23
Payer: COMMERCIAL

## 2024-08-23 ENCOUNTER — PATIENT MESSAGE (OUTPATIENT)
Dept: GASTROENTEROLOGY | Facility: CLINIC | Age: 59
End: 2024-08-23

## 2024-08-23 VITALS
HEART RATE: 90 BPM | DIASTOLIC BLOOD PRESSURE: 82 MMHG | HEIGHT: 73 IN | SYSTOLIC BLOOD PRESSURE: 152 MMHG | WEIGHT: 185.44 LBS | BODY MASS INDEX: 24.58 KG/M2

## 2024-08-23 DIAGNOSIS — R06.6 INTRACTABLE HICCUPS: Primary | ICD-10-CM

## 2024-08-23 DIAGNOSIS — K80.20 GALLSTONES: ICD-10-CM

## 2024-08-23 PROCEDURE — 99999 PR PBB SHADOW E&M-EST. PATIENT-LVL III: CPT | Mod: PBBFAC,,, | Performed by: INTERNAL MEDICINE

## 2024-08-23 RX ORDER — OLANZAPINE 2.5 MG/1
2.5 TABLET ORAL NIGHTLY
Qty: 30 TABLET | Refills: 11 | Status: SHIPPED | OUTPATIENT
Start: 2024-08-23 | End: 2025-08-23

## 2024-08-23 RX ORDER — BACLOFEN 5 MG/1
2.5 TABLET ORAL DAILY
Qty: 30 TABLET | Refills: 3 | Status: SHIPPED | OUTPATIENT
Start: 2024-08-23

## 2024-08-23 NOTE — PATIENT INSTRUCTIONS
To reset diaphragm circuit and cut off Hiccups:    - Breathe in through the nose maximally 3 Times with no exhales in between.  - Then hold your breath for 15-30 seconds  - Then slowly exhale via your mouth      Medications:  Baclofen 1/2 Once a day in the morning  Zyprexa 1 tablet at night time    If too sleepy with these medicines then stop baclofen        At any pharmacy over the counter, purchase bottle of Miralax(238gm) and 4 Bisacodyl(dulcolax) tablets, and a 64oz bottle of Gatorade (not red, blue, or purple)    The day before your procedure     1. Start a clear liquid diet 24 hours before your procedure. No solid food until after your colonoscopy.     2. Drink at least 8 oz. (1 large glass) of clear liquids every hour starting at 10:00 AM until you go to bed.     3. At 1:00PM Take 2 Bisacodyl (dulcolax) laxative tablets     4. At 5:00PM Mix Miralax 238gm bottle with 64oz of Gatorade (not red, blue, or purple). Divide solution, placing half in refrigerator. With the other half, drink an 8oz glass every 10-20 minutes until gone     5. At 9:00PM Take 2 Bisacodyl laxative tablets        On the day of your Colonoscopy   At __________am/pm, (5 hours before your scheduled arrival time) drink the remainder of the miralax solution; continue your clear liquid diet until 3 hours prior to exam. Do not drink any clear liquids past 3 hours prior to your exam.     The timing of this dose is important

## 2024-08-26 ENCOUNTER — OFFICE VISIT (OUTPATIENT)
Dept: INTERNAL MEDICINE | Facility: CLINIC | Age: 59
End: 2024-08-26
Payer: COMMERCIAL

## 2024-08-26 ENCOUNTER — LAB VISIT (OUTPATIENT)
Dept: LAB | Facility: HOSPITAL | Age: 59
End: 2024-08-26
Attending: INTERNAL MEDICINE
Payer: COMMERCIAL

## 2024-08-26 VITALS
HEART RATE: 70 BPM | WEIGHT: 193.56 LBS | BODY MASS INDEX: 25.65 KG/M2 | SYSTOLIC BLOOD PRESSURE: 180 MMHG | OXYGEN SATURATION: 99 % | DIASTOLIC BLOOD PRESSURE: 80 MMHG | HEIGHT: 73 IN

## 2024-08-26 DIAGNOSIS — R79.89 ELEVATED LFTS: Primary | ICD-10-CM

## 2024-08-26 DIAGNOSIS — N18.6 ESRD ON HEMODIALYSIS: ICD-10-CM

## 2024-08-26 DIAGNOSIS — R93.2 ABNORMAL CT OF LIVER: ICD-10-CM

## 2024-08-26 DIAGNOSIS — D69.6 THROMBOCYTOPENIA: ICD-10-CM

## 2024-08-26 DIAGNOSIS — K74.60 HEPATIC CIRRHOSIS, UNSPECIFIED HEPATIC CIRRHOSIS TYPE, UNSPECIFIED WHETHER ASCITES PRESENT: ICD-10-CM

## 2024-08-26 DIAGNOSIS — R79.89 ELEVATED LFTS: ICD-10-CM

## 2024-08-26 DIAGNOSIS — R74.8 ELEVATED ALKALINE PHOSPHATASE LEVEL: ICD-10-CM

## 2024-08-26 DIAGNOSIS — R59.1 DIFFUSE LYMPHADENOPATHY: ICD-10-CM

## 2024-08-26 DIAGNOSIS — Z12.5 SCREENING FOR PROSTATE CANCER: ICD-10-CM

## 2024-08-26 DIAGNOSIS — I10 PRIMARY HYPERTENSION: ICD-10-CM

## 2024-08-26 DIAGNOSIS — Z99.2 ESRD ON HEMODIALYSIS: ICD-10-CM

## 2024-08-26 LAB
ALBUMIN SERPL BCP-MCNC: 2.9 G/DL (ref 3.5–5.2)
ALP SERPL-CCNC: 639 U/L (ref 55–135)
ALT SERPL W/O P-5'-P-CCNC: 30 U/L (ref 10–44)
ANION GAP SERPL CALC-SCNC: 11 MMOL/L (ref 8–16)
AST SERPL-CCNC: 60 U/L (ref 10–40)
BASOPHILS # BLD AUTO: 0.02 K/UL (ref 0–0.2)
BASOPHILS NFR BLD: 0.6 % (ref 0–1.9)
BILIRUB SERPL-MCNC: 2 MG/DL (ref 0.1–1)
BUN SERPL-MCNC: 35 MG/DL (ref 6–20)
CALCIUM SERPL-MCNC: 10.3 MG/DL (ref 8.7–10.5)
CHLORIDE SERPL-SCNC: 96 MMOL/L (ref 95–110)
CO2 SERPL-SCNC: 28 MMOL/L (ref 23–29)
COMPLEXED PSA SERPL-MCNC: 0.9 NG/ML (ref 0–4)
CREAT SERPL-MCNC: 5.3 MG/DL (ref 0.5–1.4)
DIFFERENTIAL METHOD BLD: ABNORMAL
EOSINOPHIL # BLD AUTO: 0.1 K/UL (ref 0–0.5)
EOSINOPHIL NFR BLD: 3.1 % (ref 0–8)
ERYTHROCYTE [DISTWIDTH] IN BLOOD BY AUTOMATED COUNT: 17.9 % (ref 11.5–14.5)
EST. GFR  (NO RACE VARIABLE): 11.6 ML/MIN/1.73 M^2
FERRITIN SERPL-MCNC: 810 NG/ML (ref 20–300)
GLUCOSE SERPL-MCNC: 89 MG/DL (ref 70–110)
HCT VFR BLD AUTO: 34.5 % (ref 40–54)
HGB BLD-MCNC: 10.8 G/DL (ref 14–18)
IMM GRANULOCYTES # BLD AUTO: 0.01 K/UL (ref 0–0.04)
IMM GRANULOCYTES NFR BLD AUTO: 0.3 % (ref 0–0.5)
LYMPHOCYTES # BLD AUTO: 0.5 K/UL (ref 1–4.8)
LYMPHOCYTES NFR BLD: 14.9 % (ref 18–48)
MCH RBC QN AUTO: 28.6 PG (ref 27–31)
MCHC RBC AUTO-ENTMCNC: 31.3 G/DL (ref 32–36)
MCV RBC AUTO: 91 FL (ref 82–98)
MONOCYTES # BLD AUTO: 0.4 K/UL (ref 0.3–1)
MONOCYTES NFR BLD: 10.7 % (ref 4–15)
NEUTROPHILS # BLD AUTO: 2.5 K/UL (ref 1.8–7.7)
NEUTROPHILS NFR BLD: 70.4 % (ref 38–73)
NRBC BLD-RTO: 0 /100 WBC
PLATELET # BLD AUTO: 169 K/UL (ref 150–450)
PMV BLD AUTO: 9.9 FL (ref 9.2–12.9)
POTASSIUM SERPL-SCNC: 4.4 MMOL/L (ref 3.5–5.1)
PROT SERPL-MCNC: 7.4 G/DL (ref 6–8.4)
RBC # BLD AUTO: 3.78 M/UL (ref 4.6–6.2)
SODIUM SERPL-SCNC: 135 MMOL/L (ref 136–145)
WBC # BLD AUTO: 3.56 K/UL (ref 3.9–12.7)

## 2024-08-26 PROCEDURE — 3079F DIAST BP 80-89 MM HG: CPT | Mod: CPTII,S$GLB,, | Performed by: INTERNAL MEDICINE

## 2024-08-26 PROCEDURE — 1159F MED LIST DOCD IN RCRD: CPT | Mod: CPTII,S$GLB,, | Performed by: INTERNAL MEDICINE

## 2024-08-26 PROCEDURE — 80053 COMPREHEN METABOLIC PANEL: CPT | Performed by: INTERNAL MEDICINE

## 2024-08-26 PROCEDURE — 99214 OFFICE O/P EST MOD 30 MIN: CPT | Mod: S$GLB,,, | Performed by: INTERNAL MEDICINE

## 2024-08-26 PROCEDURE — 99999 PR PBB SHADOW E&M-EST. PATIENT-LVL IV: CPT | Mod: PBBFAC,,, | Performed by: INTERNAL MEDICINE

## 2024-08-26 PROCEDURE — 3008F BODY MASS INDEX DOCD: CPT | Mod: CPTII,S$GLB,, | Performed by: INTERNAL MEDICINE

## 2024-08-26 PROCEDURE — 86381 MITOCHONDRIAL ANTIBODY EACH: CPT | Performed by: INTERNAL MEDICINE

## 2024-08-26 PROCEDURE — 3077F SYST BP >= 140 MM HG: CPT | Mod: CPTII,S$GLB,, | Performed by: INTERNAL MEDICINE

## 2024-08-26 PROCEDURE — 86015 ACTIN ANTIBODY EACH: CPT | Performed by: INTERNAL MEDICINE

## 2024-08-26 PROCEDURE — 86480 TB TEST CELL IMMUN MEASURE: CPT | Performed by: INTERNAL MEDICINE

## 2024-08-26 PROCEDURE — 85025 COMPLETE CBC W/AUTO DIFF WBC: CPT | Performed by: INTERNAL MEDICINE

## 2024-08-26 PROCEDURE — 82728 ASSAY OF FERRITIN: CPT | Performed by: INTERNAL MEDICINE

## 2024-08-26 PROCEDURE — 3066F NEPHROPATHY DOC TX: CPT | Mod: CPTII,S$GLB,, | Performed by: INTERNAL MEDICINE

## 2024-08-26 PROCEDURE — 84153 ASSAY OF PSA TOTAL: CPT | Performed by: INTERNAL MEDICINE

## 2024-08-26 PROCEDURE — 36415 COLL VENOUS BLD VENIPUNCTURE: CPT | Performed by: INTERNAL MEDICINE

## 2024-08-26 PROCEDURE — 3044F HG A1C LEVEL LT 7.0%: CPT | Mod: CPTII,S$GLB,, | Performed by: INTERNAL MEDICINE

## 2024-08-26 PROCEDURE — 82977 ASSAY OF GGT: CPT | Performed by: INTERNAL MEDICINE

## 2024-08-26 NOTE — PATIENT INSTRUCTIONS
Labwork today  Schedule ultrasound of liver  Schedule hepatology appointment    Return to clinic in 3 months or sooner if needed.

## 2024-08-26 NOTE — PROGRESS NOTES
Subjective:       Patient ID: Catalino Mcfadden is a 59 y.o. male.    Chief Complaint: Hiccups      HPI  Catalino Mcfadden is a 59 y.o. year old male with HTN, HLD, ESRD on HD, t2DM (last A1c 4.1), elevated LFTs, concerns for metastatic disease, nodular liver, elevated LFTs presents for follow up. Recently saw GI and prescribed baclofen / zyprexa for hiccups.     Review of Systems   Constitutional:  Positive for activity change and fatigue. Unexpected weight change: weight stable.  Respiratory:  Negative for shortness of breath.    Cardiovascular:  Negative for chest pain and palpitations.   Gastrointestinal:  Negative for abdominal pain, diarrhea, nausea and vomiting.        Hiccups   Neurological:  Negative for weakness and headaches.         Past Medical History:   Diagnosis Date    Cancer     Diabetes mellitus, type 2     Diabetic foot ulcer associated with diabetes mellitus due to underlying condition 07/16/2020    ESRD on hemodialysis     Hyperlipidemia     Hypertension     Obese         Prior to Admission medications    Medication Sig Start Date End Date Taking? Authorizing Provider   baclofen (LIORESAL) 5 mg Tab tablet Take 0.5 tablets (2.5 mg total) by mouth once daily. 8/23/24  Yes Holger Crane MD   hydrALAZINE (APRESOLINE) 100 MG tablet Take 1 tablet (100 mg total) by mouth 3 (three) times daily. 3/28/24 3/28/25 Yes Mary Jo Villatoro MD   NIFEdipine (PROCARDIA XL) 90 MG (OSM) 24 hr tablet Take 1 tablet (90 mg total) by mouth once daily. 6/12/24 6/12/25 Yes Samreen Salvador PA-C   OLANZapine (ZYPREXA) 2.5 MG tablet Take 1 tablet (2.5 mg total) by mouth every evening. 8/23/24 8/23/25 Yes Holger Crane MD        Past medical history, surgical history, and family medical history reviewed and updated as appropriate.    Medications and allergies reviewed.     Objective:          Vitals:    08/26/24 1358   BP: (!) 180/80   BP Location: Right arm   Patient Position: Sitting   Pulse: 70   SpO2: 99%   Weight: 87.8 kg (193 lb  "9 oz)   Height: 6' 1" (1.854 m)     Body mass index is 25.54 kg/m².  Physical Exam    Lab Results   Component Value Date    WBC 4.60 05/22/2024    HGB 10.8 (L) 05/22/2024    HCT 33.0 (L) 05/22/2024     (L) 05/22/2024    CHOL 217 (H) 05/06/2024    TRIG 54 05/06/2024    HDL 71 05/06/2024    ALT 31 05/22/2024    AST 51 (H) 05/22/2024     (L) 05/29/2024    K 3.8 05/29/2024    CL 95 05/29/2024    CREATININE 3.3 (H) 05/29/2024    BUN 15 05/29/2024    CO2 27 05/29/2024    TSH 1.736 05/06/2024    PSA 1.6 02/23/2022    INR 1.1 02/26/2024    HGBA1C 4.1 05/06/2024       Assessment:       1. Elevated LFTs    2. ESRD on hemodialysis    3. Primary hypertension    4. Diffuse lymphadenopathy    5. Abnormal CT of liver    6. Screening for prostate cancer    7. Thrombocytopenia    8. Elevated alkaline phosphatase level    9. Hepatic cirrhosis, unspecified hepatic cirrhosis type, unspecified whether ascites present          Plan:     Catalino was seen today for hiccups.    Diagnoses and all orders for this visit:    Elevated LFTs  -     COMPREHENSIVE METABOLIC PANEL; Future  -     Ambulatory referral/consult to Hepatology; Future  -     US Abdomen Limited_Liver; Future  -     ANTI-SMOOTH MUSCLE ANTIBODY; Future  -     ANTIMITOCHONDRIAL ANTIBODY; Future    ESRD on hemodialysis  Comments:  MWF - [facility]    Primary hypertension  Comments:  elevated in clinic today. just took medicine before coming to clinic.    Diffuse lymphadenopathy  -     QUANTIFERON GOLD TB; Future    Abnormal CT of liver  -     COMPREHENSIVE METABOLIC PANEL; Future  -     Ambulatory referral/consult to Hepatology; Future    Screening for prostate cancer  -     PSA, Screening; Future    Thrombocytopenia  -     CBC W/ AUTO DIFFERENTIAL; Future    Elevated alkaline phosphatase level  -     GAMMA GT; Future  -     US Abdomen Limited_Liver; Future  -     ANTI-SMOOTH MUSCLE ANTIBODY; Future  -     ANTIMITOCHONDRIAL ANTIBODY; Future    Hepatic cirrhosis, " unspecified hepatic cirrhosis type, unspecified whether ascites present  -     US Abdomen Limited_Liver; Future  -     FERRITIN; Future  -     ANTI-SMOOTH MUSCLE ANTIBODY; Future  -     ANTIMITOCHONDRIAL ANTIBODY; Future        Health maintenance reviewed with patient.     Follow up in about 3 months (around 11/26/2024).    Chauncey Perez MD  Internal Medicine / Primary Care  [facility]  8/26/2024

## 2024-08-27 LAB — GGT SERPL-CCNC: 1350 U/L (ref 8–55)

## 2024-08-28 LAB
GAMMA INTERFERON BACKGROUND BLD IA-ACNC: 0.12 IU/ML
M TB IFN-G CD4+ BCKGRND COR BLD-ACNC: 0 IU/ML
M TB IFN-G CD4+ BCKGRND COR BLD-ACNC: 0.01 IU/ML
MITOGEN IGNF BCKGRD COR BLD-ACNC: 7.32 IU/ML
TB GOLD PLUS: NEGATIVE

## 2024-08-29 LAB
MITOCHONDRIA AB TITR SER IF: NORMAL {TITER}
SMOOTH MUSCLE AB TITR SER IF: NORMAL {TITER}

## 2024-09-03 ENCOUNTER — TELEPHONE (OUTPATIENT)
Dept: ENDOSCOPY | Facility: HOSPITAL | Age: 59
End: 2024-09-03
Payer: COMMERCIAL

## 2024-09-03 ENCOUNTER — PATIENT OUTREACH (OUTPATIENT)
Dept: ADMINISTRATIVE | Facility: OTHER | Age: 59
End: 2024-09-03
Payer: COMMERCIAL

## 2024-09-04 ENCOUNTER — PATIENT MESSAGE (OUTPATIENT)
Dept: ENDOSCOPY | Facility: HOSPITAL | Age: 59
End: 2024-09-04
Payer: COMMERCIAL

## 2024-09-04 ENCOUNTER — TELEPHONE (OUTPATIENT)
Dept: ENDOSCOPY | Facility: HOSPITAL | Age: 59
End: 2024-09-04
Payer: COMMERCIAL

## 2024-09-04 DIAGNOSIS — Z12.11 SPECIAL SCREENING FOR MALIGNANT NEOPLASMS, COLON: Primary | ICD-10-CM

## 2024-09-04 RX ORDER — SODIUM, POTASSIUM,MAG SULFATES 17.5-3.13G
1 SOLUTION, RECONSTITUTED, ORAL ORAL DAILY
Qty: 1 KIT | Refills: 0 | Status: SHIPPED | OUTPATIENT
Start: 2024-09-04 | End: 2024-09-06

## 2024-09-04 NOTE — TELEPHONE ENCOUNTER
Received in basket message        Oliva Viramontes1 hour ago (11:11 AM)     KJ  CHW - Follow Up     Olivaivone Viramontes, Community Health Worker completed a follow up visit with patient via telephone today.  Pt/Caregiver reported: Mr. Mcfadden stated that he does not need anything right now.    Community Health Worker provided: CHW reminded Pt of his colonoscopy on September 9.  Mr. Mcfadden stated that he did not like the medicine and wants something else to drink.  CHW sent a message to Rodo Cooper RN regarding Pts request to drink a different medicine prior to the Colonoscopy   Follow-up Outreach - Due: 9/13/2024             Note     Returned pt call. Pt states peg prep makes him sick and cannot make it to bathroom in time.suprep sent to [facility] and recommended pt obtain depends or adult diapers to help with incontinence.    Spoke to pt for pre-call to confirm scheduled Colonoscopy and patient verbalized understanding of the following:       Date of Procedure (s)  verified 9/9/24  Arrival Time 11:45 AM verified.  Location of Procedure(s) [place] 2nd Floor verified.  NPO status reinforced. Ok to continue clear liquids up until 4 hours prior to the Endoscopy procedure.     patient denies taking blood thinning or glp1 medications    Pt confirmed receipt of prep instructions and Rx prep (if applicable).  Instructions provided to patient via [facility]  Pt confirmed ride home after procedure if procedure requires anesthesia.   Pre-call screening questionnaire reviewed and completed with patient.   Appointment details are tentative, including check-in time.  If the patient begins taking any blood thinning medications, injectable weight loss/diabetes medications (other than insulin), or Adipex (phentermine) patient was instructed to contact the endoscopy scheduling department as soon as possible.  Patient was advised to call the endoscopy scheduling department if any questions or concerns arise.       [facility]  Scheduling Department

## 2024-09-04 NOTE — PROGRESS NOTES
CHW - Follow Up    Oliva Viramontes Community Health Worker completed a follow up visit with patient via telephone today.  Pt/Caregiver reported: Mr. Mcfadden stated that he does not need anything right now.    Community Health Worker provided: CHW reminded Pt of his colonoscopy on September 9.  Mr. Mcfadden stated that he did not like the medicine and wants something else to drink.  CHW sent a message to Rodo Cooper RN regarding Pts request to drink a different medicine prior to the Colonoscopy   Follow-up Outreach - Due: 9/13/2024

## 2024-09-06 ENCOUNTER — TELEPHONE (OUTPATIENT)
Dept: ENDOSCOPY | Facility: HOSPITAL | Age: 59
End: 2024-09-06
Payer: COMMERCIAL

## 2024-09-06 DIAGNOSIS — Z12.11 SPECIAL SCREENING FOR MALIGNANT NEOPLASMS, COLON: Primary | ICD-10-CM

## 2024-09-06 RX ORDER — SODIUM, POTASSIUM,MAG SULFATES 17.5-3.13G
1 SOLUTION, RECONSTITUTED, ORAL ORAL DAILY
Qty: 1 KIT | Refills: 0 | Status: SHIPPED | OUTPATIENT
Start: 2024-09-06 | End: 2024-09-08

## 2024-09-09 ENCOUNTER — HOSPITAL ENCOUNTER (OUTPATIENT)
Facility: HOSPITAL | Age: 59
Discharge: HOME | End: 2024-09-09
Attending: INTERNAL MEDICINE | Admitting: INTERNAL MEDICINE
Payer: COMMERCIAL

## 2024-09-09 ENCOUNTER — ANESTHESIA (OUTPATIENT)
Dept: ENDOSCOPY | Facility: HOSPITAL | Age: 59
End: 2024-09-09
Payer: COMMERCIAL

## 2024-09-09 ENCOUNTER — ANESTHESIA EVENT (OUTPATIENT)
Dept: ENDOSCOPY | Facility: HOSPITAL | Age: 59
End: 2024-09-09
Payer: COMMERCIAL

## 2024-09-09 VITALS
HEIGHT: 73 IN | OXYGEN SATURATION: 100 % | TEMPERATURE: 98 F | WEIGHT: 193 LBS | HEART RATE: 72 BPM | SYSTOLIC BLOOD PRESSURE: 138 MMHG | BODY MASS INDEX: 25.58 KG/M2 | RESPIRATION RATE: 12 BRPM | DIASTOLIC BLOOD PRESSURE: 65 MMHG

## 2024-09-09 DIAGNOSIS — K63.5 COLON POLYP: ICD-10-CM

## 2024-09-09 PROCEDURE — 45385 COLONOSCOPY W/LESION REMOVAL: CPT | Mod: ,,, | Performed by: INTERNAL MEDICINE

## 2024-09-09 PROCEDURE — 45381 COLONOSCOPY SUBMUCOUS NJX: CPT | Mod: 51,,, | Performed by: INTERNAL MEDICINE

## 2024-09-09 PROCEDURE — 27200997: Performed by: INTERNAL MEDICINE

## 2024-09-09 PROCEDURE — 45381 COLONOSCOPY SUBMUCOUS NJX: CPT | Performed by: INTERNAL MEDICINE

## 2024-09-09 PROCEDURE — 27201028 HC NEEDLE, SCLERO: Performed by: INTERNAL MEDICINE

## 2024-09-09 PROCEDURE — 25000003 PHARM REV CODE 250: Performed by: NURSE ANESTHETIST, CERTIFIED REGISTERED

## 2024-09-09 PROCEDURE — 88305 TISSUE EXAM BY PATHOLOGIST: CPT | Performed by: STUDENT IN AN ORGANIZED HEALTH CARE EDUCATION/TRAINING PROGRAM

## 2024-09-09 PROCEDURE — 37000008 HC ANESTHESIA 1ST 15 MINUTES: Performed by: INTERNAL MEDICINE

## 2024-09-09 PROCEDURE — 63600175 PHARM REV CODE 636 W HCPCS: Performed by: NURSE ANESTHETIST, CERTIFIED REGISTERED

## 2024-09-09 PROCEDURE — 27201089 HC SNARE, DISP (ANY): Performed by: INTERNAL MEDICINE

## 2024-09-09 PROCEDURE — 45385 COLONOSCOPY W/LESION REMOVAL: CPT | Performed by: INTERNAL MEDICINE

## 2024-09-09 PROCEDURE — 88309 TISSUE EXAM BY PATHOLOGIST: CPT | Performed by: STUDENT IN AN ORGANIZED HEALTH CARE EDUCATION/TRAINING PROGRAM

## 2024-09-09 PROCEDURE — 27202363 HC INJECTION AGENT, SUBMUCOSAL, ANY: Performed by: INTERNAL MEDICINE

## 2024-09-09 PROCEDURE — 37000009 HC ANESTHESIA EA ADD 15 MINS: Performed by: INTERNAL MEDICINE

## 2024-09-09 RX ORDER — PROPOFOL 10 MG/ML
VIAL (ML) INTRAVENOUS
Status: DISCONTINUED | OUTPATIENT
Start: 2024-09-09 | End: 2024-09-09

## 2024-09-09 RX ORDER — LIDOCAINE HYDROCHLORIDE 20 MG/ML
INJECTION INTRAVENOUS
Status: DISCONTINUED | OUTPATIENT
Start: 2024-09-09 | End: 2024-09-09

## 2024-09-09 RX ORDER — GLUCAGON 1 MG
1 KIT INJECTION
Status: DISCONTINUED | OUTPATIENT
Start: 2024-09-09 | End: 2024-09-09 | Stop reason: HOSPADM

## 2024-09-09 RX ORDER — SODIUM CHLORIDE 0.9 % (FLUSH) 0.9 %
10 SYRINGE (ML) INJECTION
Status: DISCONTINUED | OUTPATIENT
Start: 2024-09-09 | End: 2024-09-09 | Stop reason: HOSPADM

## 2024-09-09 RX ORDER — SODIUM CHLORIDE 9 MG/ML
INJECTION, SOLUTION INTRAVENOUS CONTINUOUS
Status: DISCONTINUED | OUTPATIENT
Start: 2024-09-09 | End: 2024-09-09 | Stop reason: HOSPADM

## 2024-09-09 RX ORDER — HALOPERIDOL 5 MG/ML
0.5 INJECTION INTRAMUSCULAR EVERY 10 MIN PRN
Status: DISCONTINUED | OUTPATIENT
Start: 2024-09-09 | End: 2024-09-09 | Stop reason: HOSPADM

## 2024-09-09 RX ADMIN — SODIUM CHLORIDE: 0.9 INJECTION, SOLUTION INTRAVENOUS at 12:09

## 2024-09-09 RX ADMIN — PROPOFOL 50 MG: 10 INJECTION, EMULSION INTRAVENOUS at 12:09

## 2024-09-09 RX ADMIN — LIDOCAINE HYDROCHLORIDE 100 MG: 20 INJECTION INTRAVENOUS at 12:09

## 2024-09-09 NOTE — H&P
Short Stay Endoscopy History and Physical    PCP - Chauncey Perez MD  Referring Physician - Roopa Pérez MD  [address] [place] [address]    Procedure - colonoscopy  ASA - per anesthesia  Mallampati - per anesthesia  History of Anesthesia problems - no  Family history Anesthesia problems -  no   Plan of anesthesia - General    HPI:  This is a 59 y.o. male here for evaluation of: emr polyp    Reflux - no  Dysphagia - no  Abdominal pain - no  Diarrhea - no    ROS:  Constitutional: No fevers, chills, No weight loss  CV: No chest pain  Pulm: No cough, No shortness of breath  Ophtho: No vision changes  GI: see HPI  Derm: No rash    Medical History:  has a past medical history of Cancer, Diabetes mellitus, type 2, Diabetic foot ulcer associated with diabetes mellitus due to underlying condition (07/16/2020), ESRD on hemodialysis, Hyperlipidemia, Hypertension, and Obese.    Surgical History:  has a past surgical history that includes Cervical disc surgery (07/11/2016); left leg orthopedic surgery (Left); Debridement (Left, 07/17/2020); Bone biopsy (Left, 07/17/2020); Debridement of foot (Right); Fracture surgery (Right); Insertion of tunneled central venous catheter (CVC) with subcutaneous port (N/A, 06/11/2021); Esophagogastroduodenoscopy (N/A, 12/16/2022); Esophageal manometry with measurement of impedance (N/A, 03/27/2023); AV fistula placement (Left, 07/06/2023); Upper gastrointestinal endoscopy; Esophagogastroduodenoscopy (N/A, 12/5/2023); Injection of anesthetic agent around nerve (Right, 2/2/2024); Endobronchial ultrasound (N/A, 3/1/2024); Revision of arteriovenous fistula (Left, 3/14/2024); Colonoscopy (N/A, 3/25/2024); Colonoscopy (N/A, 4/8/2024); and Colonoscopy (N/A, 4/16/2024).    Family History: family history includes Heart disease in his father. He was adopted..    Social History:  reports that he has never smoked. He has never used smokeless tobacco. He reports that he does not  currently use alcohol. He reports that he does not use drugs.    Review of patient's allergies indicates:  No Known Allergies    Medications:   Medications Prior to Admission   Medication Sig Dispense Refill Last Dose    hydrALAZINE (APRESOLINE) 100 MG tablet Take 1 tablet (100 mg total) by mouth 3 (three) times daily. 270 tablet 3 2024    NIFEdipine (PROCARDIA XL) 90 MG (OSM) 24 hr tablet Take 1 tablet (90 mg total) by mouth once daily. 90 tablet 3 2024    baclofen (LIORESAL) 5 mg Tab tablet Take 0.5 tablets (2.5 mg total) by mouth once daily. 30 tablet 3     OLANZapine (ZYPREXA) 2.5 MG tablet Take 1 tablet (2.5 mg total) by mouth every evening. 30 tablet 11     [] sodium,potassium,mag sulfates (SUPREP BOWEL PREP KIT) 17.5-3.13-1.6 gram SolR Take 177 mLs by mouth once daily. for 2 days 1 kit 0        Physical Exam:    Vital Signs:   Vitals:    24 1153   BP: (!) 181/91   Pulse: 80   Resp: 16   Temp: 98.2 °F (36.8 °C)       General Appearance: Well appearing in no acute distress    Labs:  Lab Results   Component Value Date    WBC 3.56 (L) 2024    HGB 10.8 (L) 2024    HCT 34.5 (L) 2024     2024    CHOL 217 (H) 2024    TRIG 54 2024    HDL 71 2024    ALT 30 2024    AST 60 (H) 2024     (L) 2024    K 4.3 2024    CL 96 2024    CREATININE 5.3 (H) 2024    BUN 35 (H) 2024    CO2 28 2024    TSH 1.736 2024    PSA 0.90 2024    INR 1.1 2024    HGBA1C 4.1 2024       I have explained the risks and benefits of this endoscopic procedure to the patient including but not limited to bleeding, inflammation, infection, perforation, and death.      Vikas Quinonez MD

## 2024-09-09 NOTE — TRANSFER OF CARE
"Anesthesia Transfer of Care Note    Patient: Catalino Mcfadden    Procedure(s) Performed: Procedure(s) (LRB):  RESECTION, MUCOSA, COLON, ENDOSCOPIC (N/A)    Patient location: [facility]    Anesthesia Type: general    Transport from OR: Transported from OR on room air with adequate spontaneous ventilation    Post pain: adequate analgesia    Post assessment: no apparent anesthetic complications    Post vital signs: stable    Level of consciousness: awake and alert    Nausea/Vomiting: no nausea/vomiting    Complications: none    Transfer of care protocol was followed      Last vitals: Visit Vitals  BP (!) 125/59   Pulse 68   Temp 36.5 °C (97.7 °F) (Skin)   Resp 12   Ht 6' 1" (1.854 m)   Wt 87.5 kg (193 lb)   SpO2 100%   BMI 25.46 kg/m²     "

## 2024-09-09 NOTE — PROGRESS NOTES
Discharge instructions reviewed w/pt, verbalized understanding. Pt in NADn.No complaints at this time. , refused liquids at this time. D/c'd home w/ friend Darinel, escorted in wheelchair to car.

## 2024-09-09 NOTE — ANESTHESIA POSTPROCEDURE EVALUATION
Anesthesia Post Evaluation    Patient: Catalino Mcfadden    Procedure(s) Performed: Procedure(s) (LRB):  RESECTION, MUCOSA, COLON, ENDOSCOPIC (N/A)    Final Anesthesia Type: general      Patient location during evaluation: [facility]  Patient participation: Yes- Able to Participate  Level of consciousness: awake and alert and oriented  Post-procedure vital signs: reviewed and stable  Pain management: adequate  Airway patency: patent  YELITZA mitigation strategies: Multimodal analgesia  PONV status at discharge: No PONV  Anesthetic complications: no      Cardiovascular status: blood pressure returned to baseline and hemodynamically stable  Respiratory status: unassisted, spontaneous ventilation and room air  Hydration status: euvolemic  Follow-up not needed.              Vitals Value Taken Time   /65 09/09/24 1348   Temp 36.4 °C (97.5 °F) 09/09/24 1348   Pulse 72 09/09/24 1348   Resp 12 09/09/24 1348   SpO2 100 % 09/09/24 1348         No case tracking events are documented in the log.      Pain/Madiha Score: Madiha Score: 10 (9/9/2024  1:31 PM)

## 2024-09-09 NOTE — ANESTHESIA PREPROCEDURE EVALUATION
Pre-operative evaluation for Procedure(s) (LRB):  RESECTION, MUCOSA, COLON, ENDOSCOPIC (N/A)    Catalino Mcfadden is a 59 y.o. male     ECHO (if on file):  No results found for this or any previous visit.      Patient Active Problem List   Diagnosis    Type 2 diabetes mellitus with stage 5 chronic kidney disease    HTN (hypertension)    Hyperlipidemia, acquired    ED (erectile dysfunction)    History of diabetic ulcer of foot    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    Long term (current) use of antibiotics    Acute osteomyelitis of metatarsal bone of left foot    Diabetic ulcer of toe of left foot    Hyponatremia    Osteomyelitis of left foot    Healed ulcer of left foot on examination    Iron deficiency anemia    Anemia due to chronic kidney disease, on chronic dialysis    Diabetic ulcer of left midfoot associated with type 2 diabetes mellitus, with fat layer exposed    Type II diabetes mellitus with neurological manifestations    Foot ulcer, right, with fat layer exposed    Foot ulceration, left, with fat layer exposed    ESRD on hemodialysis    Malignant renovascular hypertension    Calcified granuloma of lung    Abnormal findings on diagnostic imaging of lung    Tracheobronchomalacia    Intractable hiccups    Serum total bilirubin elevated    Lower extremity weakness    Abnormal MRI, lumbar spine    Goals of care, counseling/discussion    DJD (degenerative joint disease)    Complete lesion of L1 level of lumbar spinal cord    Debility    Diffuse lymphadenopathy    Lytic bone lesions on xray    Hepatic lesion    Splenic lesion    Moderate protein-calorie malnutrition    Acute blood loss anemia       Review of patient's allergies indicates:  No Known Allergies    No current facility-administered medications on file prior to encounter.     Current Outpatient Medications on File Prior to Encounter   Medication Sig Dispense Refill     hydrALAZINE (APRESOLINE) 100 MG tablet Take 1 tablet (100 mg total) by mouth 3 (three) times daily. 270 tablet 3       Past Surgical History:   Procedure Laterality Date    AV FISTULA PLACEMENT Left 07/06/2023    Procedure: CREATION, AV FISTULA;  Surgeon: Daniel Poon MD;  Location: [facility] OR;  Service: Vascular;  Laterality: Left;  RN PREOP 6/28/2023  LABS DAY OF SURGERY    BONE BIOPSY Left 07/17/2020    Procedure: BIOPSY, BONE;  Surgeon: Verona Whitmore DPM;  Location: [facility] OR;  Service: Podiatry;  Laterality: Left;    CERVICAL DISC SURGERY  07/11/2016    COLONOSCOPY N/A 3/25/2024    Procedure: COLONOSCOPY;  Surgeon: Roopa Pérez MD;  Location: [facility] (4TH FLR);  Service: Endoscopy;  Laterality: N/A;  Ref By: Dr. Pérez   constipation prep  Diaylsis Patient Lab has been ordered  3/20-precall complete-MS    COLONOSCOPY N/A 4/8/2024    Procedure: COLONOSCOPY;  Surgeon: Roopa Pérez MD;  Location: [facility] (2ND FLR);  Service: Endoscopy;  Laterality: N/A;  Prep instructions sent via portal/given to pt in clinic  pt had to be r/s at  request  Dialysis [place],Thurs,Sat-dw  Peg Prep-dw  4/2/24- Labs - dialysis /poor prep on 3/26 - PEG x 2 - extended prep /LVM for precall - ERW  4/5-LVM for precall-KPvt    COLONOSCOPY N/A 4/16/2024    Procedure: COLONOSCOPY;  Surgeon: Jarett Hill MD;  Location: [facility] (2ND FLR);  Service: Endoscopy;  Laterality: N/A;    DEBRIDEMENT Left 07/17/2020    Procedure: DEBRIDEMENT;  Surgeon: Verona Whitmore DPM;  Location: [facility] OR;  Service: Podiatry;  Laterality: Left;    DEBRIDEMENT OF FOOT Right     toes & plantar surface    ENDOBRONCHIAL ULTRASOUND N/A 3/1/2024    Procedure: ENDOBRONCHIAL ULTRASOUND (EBUS);  Surgeon: Francisco Fleming MD;  Location: [facility] 2ND FLR;  Service: Pulmonary;  Laterality: N/A;    ESOPHAGEAL MANOMETRY WITH MEASUREMENT OF IMPEDANCE N/A 03/27/2023    Procedure: MANOMETRY, ESOPHAGUS, WITH IMPEDANCE MEASUREMENT;  Surgeon: Roopa  HARVINDER Pérez MD;  Location: [facility] ENDO (4TH FLR);  Service: Endoscopy;  Laterality: N/A;  Belching and rumination protocol please  instructions via portal - sm    ESOPHAGOGASTRODUODENOSCOPY N/A 12/16/2022    Procedure: EGD (ESOPHAGOGASTRODUODENOSCOPY);  Surgeon: Eren Francois MD;  Location: [facility] ENDO;  Service: Endoscopy;  Laterality: N/A;  Pt. on Dialysis. K+ ordered prior to procedure.EC    ESOPHAGOGASTRODUODENOSCOPY N/A 12/5/2023    Procedure: EGD (ESOPHAGOGASTRODUODENOSCOPY);  Surgeon: Kash Johnston MD;  Location: [facility] ENDO;  Service: Endoscopy;  Laterality: N/A;    FRACTURE SURGERY Right     medial ankle, metal plate present    INJECTION OF ANESTHETIC AGENT AROUND NERVE Right 2/2/2024    Procedure: BLOCK, NERVE STELLATE GANGLION *HOLD ASPIRIN 5 DAYS PRIOR*;  Surgeon: Gokul Gonzalez MD;  Location: [facility] PAIN MGT;  Service: Pain Management;  Laterality: Right;  [phone]    INSERTION OF TUNNELED CENTRAL VENOUS CATHETER (CVC) WITH SUBCUTANEOUS PORT N/A 06/11/2021    Procedure: TUNNEL CATH INSERTION WITHOUT PORT;  Surgeon: Dosc Diagnostic Provider;  Location: [facility] OR;  Service: Radiology;  Laterality: N/A;  11AM START---PHONE PREOP 6/10/21---COVID POSTIVE ON 7/2020---NO S/S    left leg orthopedic surgery Left     REVISION OF ARTERIOVENOUS FISTULA Left 3/14/2024    Procedure: REVISION, AV FISTULA;  Surgeon: Daniel Poon MD;  Location: [facility] OR;  Service: Vascular;  Laterality: Left;  RN preop 3/6/2024----NEED ORDERS    UPPER GASTROINTESTINAL ENDOSCOPY         Social History     Socioeconomic History    Marital status:     Number of children: 1   Tobacco Use    Smoking status: Never    Smokeless tobacco: Never   Substance and Sexual Activity    Alcohol use: Not Currently     Comment: occ rare beer    Drug use: No    Sexual activity: Not Currently   Social History Narrative    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   Physical Activity: Inactive (2024)    Exercise Vital Sign     Days of Exercise per Week: 0 days     Minutes of Exercise per Session: 0 min   Stress: No Stress Concern Present (2024)    [employer] - Occupational Stress Questionnaire     Feeling of Stress : Only a little   Recent Concern: Stress - Stress Concern Present (2024)    [employer] - Occupational Stress Questionnaire     Feeling of Stress : To some extent   Housing Stability: High Risk (2024)    Housing Stability Vital Sign     Unable to Pay for Housing in the Last Year: No     Homeless in the Last Year: Yes         CBC: No results for input(s): "WBC", "RBC", "HGB", "HCT", "PLT", "MCV", "MCH", "MCHC" in the last 72 hours.    CMP:   Recent Labs     24  1109   K 4.3       INR  No results for input(s): "PT", "INR", "PROTIME", "APTT" in the last 72 hours.        Diagnostic Studies:      [facility] Echo:  No results found. However, due to the size of the patient record, not all encounters were searched. Please check Results Review for a complete set of results.        Pre-op Assessment    I have reviewed the Patient Summary Reports.     I have reviewed the Nursing Notes. I have reviewed the NPO Status.   I have reviewed the Medications.     Review of Systems  Anesthesia Hx:   History of prior surgery of interest to airway management or planning:            Denies Personal Hx of Anesthesia complications.       "              Cardiovascular:     Hypertension                                  Hypertension         Renal/:  Chronic Renal Disease, ESRD, Dialysis        Kidney Function/Disease             Hepatic/GI:      Liver Disease,         Liver Disease        Musculoskeletal:  Arthritis        Arthritis          Neurological:      Arthritis                           Endocrine:  Diabetes    Diabetes                    Obesity / BMI > 30      Physical Exam  General: Cooperative, Alert and Oriented    Airway:  Mallampati: II   Mouth Opening: Normal  TM Distance: Normal  Tongue: Normal  Neck ROM: Normal ROM    Dental:  Dentures  Many teeth missing. Poor dentition.       Anesthesia Plan  Type of Anesthesia, risks & benefits discussed:    Anesthesia Type: Gen Natural Airway, Gen Supraglottic Airway, Gen ETT  Intra-op Monitoring Plan: Standard ASA Monitors  Post Op Pain Control Plan: multimodal analgesia and IV/PO Opioids PRN  Induction:  IV  Informed Consent: Informed consent signed with the Patient and all parties understand the risks and agree with anesthesia plan.  All questions answered.   ASA Score: 3  Day of Surgery Review of History & Physical: H&P Update referred to the surgeon/provider.    Ready For Surgery From Anesthesia Perspective.     .

## 2024-09-09 NOTE — PROVATION PATIENT INSTRUCTIONS
Discharge Summary/Instructions after an Endoscopic Procedure  Patient Name: Catalino Mcfadden  Patient MRN: [id number]  Patient YOB: 1965 Monday, September 9, 2024  Vikas Quinonez MD  Dear patient,  As a result of recent federal legislation (The Federal Cures Act), you may   receive lab or pathology results from your procedure in your [facility]   account before your physician is able to contact you. Your physician or   their representative will relay the results to you with their   recommendations at their soonest availability.  Thank you,  RESTRICTIONS:  During your procedure today, you received medications for sedation.  These   medications may affect your judgment, balance and coordination.  Therefore,   for 24 hours, you have the following restrictions:   - DO NOT drive a car, operate machinery, make legal/financial decisions,   sign important papers or drink alcohol.    ACTIVITY:  Today: no heavy lifting, straining or running due to procedural   sedation/anesthesia.  The following day: return to full activity including work.  DIET:  Eat and drink normally unless instructed otherwise.     TREATMENT FOR COMMON SIDE EFFECTS:  - Mild abdominal pain, nausea, belching, bloating or excessive gas:  rest,   eat lightly and use a heating pad.  - Sore Throat: treat with throat lozenges and/or gargle with warm salt   water.  - Because air was used during the procedure, expelling large amounts of air   from your rectum or belching is normal.  - If a bowel prep was taken, you may not have a bowel movement for 1-3 days.    This is normal.  SYMPTOMS TO WATCH FOR AND REPORT TO YOUR PHYSICIAN:  1. Abdominal pain or bloating, other than gas cramps.  2. Chest pain.  3. Back pain.  4. Signs of infection such as: chills or fever occurring within 24 hours   after the procedure.  5. Rectal bleeding, which would show as bright red, maroon, or black stools.   (A tablespoon of blood from the rectum is not serious, especially if    hemorrhoids are present.)  6. Vomiting.  7. Weakness or dizziness.  GO DIRECTLY TO THE NEAREST EMERGENCY ROOM IF YOU HAVE ANY OF THE FOLLOWING:      Difficulty breathing              Chills and/or fever over 101 F   Persistent vomiting and/or vomiting blood   Severe abdominal pain   Severe chest pain   Black, tarry stools   Bleeding- more than one tablespoon   Any other symptom or condition that you feel may need urgent attention  Your doctor recommends these additional instructions:  If any biopsies were taken, your doctors clinic will contact you in 1 to 2   weeks with any results.  - Discharge patient to home.   - Resume previous diet.   - Repeat colonoscopy in 2 years for surveillance.AES services not required  - Return to primary care physician at appointment to be scheduled.  For questions, problems or results please call your physician - Vikas Quinonez MD at Work:  [phone].  [facility], EMERGENCY ROOM PHONE NUMBER: [phone]  IF A COMPLICATION OR EMERGENCY SITUATION ARISES AND YOU ARE UNABLE TO REACH   YOUR PHYSICIAN - GO DIRECTLY TO THE EMERGENCY ROOM.  Vikas Quinonez MD  9/9/2024 1:16:26 PM  This report has been verified and signed electronically.  Dear patient,  As a result of recent federal legislation (The Federal Cures Act), you may   receive lab or pathology results from your procedure in your [facility]   account before your physician is able to contact you. Your physician or   their representative will relay the results to you with their   recommendations at their soonest availability.  Thank you,  PROVATION

## 2024-09-12 ENCOUNTER — PATIENT OUTREACH (OUTPATIENT)
Dept: ADMINISTRATIVE | Facility: OTHER | Age: 59
End: 2024-09-12
Payer: COMMERCIAL
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
## 2024-09-13 LAB
FINAL PATHOLOGIC DIAGNOSIS: NORMAL
GROSS: NORMAL
Lab: NORMAL

## 2024-09-18 ENCOUNTER — HOSPITAL ENCOUNTER (OUTPATIENT)
Dept: RADIOLOGY | Facility: HOSPITAL | Age: 59
Discharge: HOME | End: 2024-09-18
Attending: INTERNAL MEDICINE
Payer: COMMERCIAL

## 2024-09-18 DIAGNOSIS — R74.8 ELEVATED ALKALINE PHOSPHATASE LEVEL: ICD-10-CM

## 2024-09-18 DIAGNOSIS — K74.60 HEPATIC CIRRHOSIS, UNSPECIFIED HEPATIC CIRRHOSIS TYPE, UNSPECIFIED WHETHER ASCITES PRESENT: ICD-10-CM

## 2024-09-18 DIAGNOSIS — R79.89 ELEVATED LFTS: ICD-10-CM

## 2024-09-18 PROCEDURE — 76705 ECHO EXAM OF ABDOMEN: CPT | Mod: TC

## 2024-09-18 PROCEDURE — 76705 ECHO EXAM OF ABDOMEN: CPT | Mod: 26,,, | Performed by: RADIOLOGY

## 2024-09-19 DIAGNOSIS — R79.89 ELEVATED LFTS: ICD-10-CM

## 2024-09-19 DIAGNOSIS — K76.9 LIVER DISEASE, UNSPECIFIED: Primary | ICD-10-CM

## 2024-09-19 DIAGNOSIS — K74.60 HEPATIC CIRRHOSIS, UNSPECIFIED HEPATIC CIRRHOSIS TYPE, UNSPECIFIED WHETHER ASCITES PRESENT: ICD-10-CM

## 2024-09-19 DIAGNOSIS — R59.1 DIFFUSE LYMPHADENOPATHY: ICD-10-CM

## 2024-09-20 ENCOUNTER — TELEPHONE (OUTPATIENT)
Dept: INTERNAL MEDICINE | Facility: CLINIC | Age: 59
End: 2024-09-20
Payer: COMMERCIAL

## 2024-09-20 NOTE — TELEPHONE ENCOUNTER
----- Message from Destinee Jerome MA sent at 9/19/2024  4:54 PM CDT -----    ----- Message -----  From: Chauncey Perez MD  Sent: 9/19/2024   4:47 PM CDT  To: [facility] Staff    Schedule pt for MRI liver

## 2024-09-20 NOTE — TELEPHONE ENCOUNTER
Spoke with pt. and he asked for me to give him a call back later to finalize scheduling his appt. Would you like me to tell him anything in reference to his results?

## 2024-09-23 ENCOUNTER — OFFICE VISIT (OUTPATIENT)
Dept: NEUROLOGY | Facility: CLINIC | Age: 59
End: 2024-09-23
Payer: COMMERCIAL

## 2024-09-23 ENCOUNTER — PATIENT OUTREACH (OUTPATIENT)
Dept: ADMINISTRATIVE | Facility: OTHER | Age: 59
End: 2024-09-23
Payer: COMMERCIAL

## 2024-09-23 VITALS
BODY MASS INDEX: 26.12 KG/M2 | SYSTOLIC BLOOD PRESSURE: 178 MMHG | HEIGHT: 73 IN | OXYGEN SATURATION: 100 % | WEIGHT: 197.06 LBS | DIASTOLIC BLOOD PRESSURE: 85 MMHG | HEART RATE: 76 BPM

## 2024-09-23 DIAGNOSIS — Z86.73 HX OF COMPLETED STROKE: ICD-10-CM

## 2024-09-23 DIAGNOSIS — G72.41 INCLUSION BODY MYOSITIS: ICD-10-CM

## 2024-09-23 DIAGNOSIS — R59.1 DIFFUSE LYMPHADENOPATHY: ICD-10-CM

## 2024-09-23 DIAGNOSIS — R93.7 ABNORMAL MRI, LUMBAR SPINE: ICD-10-CM

## 2024-09-23 DIAGNOSIS — R29.898 WEAKNESS OF EXTREMITY: Primary | ICD-10-CM

## 2024-09-23 DIAGNOSIS — R53.81 DEBILITY: ICD-10-CM

## 2024-09-23 DIAGNOSIS — I10 PRIMARY HYPERTENSION: ICD-10-CM

## 2024-09-23 PROCEDURE — 99205 OFFICE O/P NEW HI 60 MIN: CPT | Mod: S$GLB,,, | Performed by: STUDENT IN AN ORGANIZED HEALTH CARE EDUCATION/TRAINING PROGRAM

## 2024-09-23 PROCEDURE — 99999 PR PBB SHADOW E&M-EST. PATIENT-LVL V: CPT | Mod: PBBFAC,,, | Performed by: STUDENT IN AN ORGANIZED HEALTH CARE EDUCATION/TRAINING PROGRAM

## 2024-09-23 PROCEDURE — 3044F HG A1C LEVEL LT 7.0%: CPT | Mod: CPTII,S$GLB,, | Performed by: STUDENT IN AN ORGANIZED HEALTH CARE EDUCATION/TRAINING PROGRAM

## 2024-09-23 PROCEDURE — 3077F SYST BP >= 140 MM HG: CPT | Mod: CPTII,S$GLB,, | Performed by: STUDENT IN AN ORGANIZED HEALTH CARE EDUCATION/TRAINING PROGRAM

## 2024-09-23 PROCEDURE — 99417 PROLNG OP E/M EACH 15 MIN: CPT | Mod: S$GLB,,, | Performed by: STUDENT IN AN ORGANIZED HEALTH CARE EDUCATION/TRAINING PROGRAM

## 2024-09-23 PROCEDURE — 1159F MED LIST DOCD IN RCRD: CPT | Mod: CPTII,S$GLB,, | Performed by: STUDENT IN AN ORGANIZED HEALTH CARE EDUCATION/TRAINING PROGRAM

## 2024-09-23 PROCEDURE — 3079F DIAST BP 80-89 MM HG: CPT | Mod: CPTII,S$GLB,, | Performed by: STUDENT IN AN ORGANIZED HEALTH CARE EDUCATION/TRAINING PROGRAM

## 2024-09-23 PROCEDURE — 3066F NEPHROPATHY DOC TX: CPT | Mod: CPTII,S$GLB,, | Performed by: STUDENT IN AN ORGANIZED HEALTH CARE EDUCATION/TRAINING PROGRAM

## 2024-09-23 PROCEDURE — 3008F BODY MASS INDEX DOCD: CPT | Mod: CPTII,S$GLB,, | Performed by: STUDENT IN AN ORGANIZED HEALTH CARE EDUCATION/TRAINING PROGRAM

## 2024-09-23 NOTE — PROGRESS NOTES
Chief Complaint and Duration     Chief Complaint   Patient presents with    Extremity Weakness   Ongoing, for years    History of Present Illness     Catalino Mcfadden is a 59 y.o. male with complicated history.  Patient with hypertension, diabetes type 2, ESRD on hemodialysis with a fistula of the left upper extremity, has been followed by Heme-Onc for iron-deficiency and diffuse lymphadenopathy with possible osseous lesions, concerns for malignancy in which has had unremarkable workup thus far, patient endorsing significant weight loss with difficulty controlling his stools.  Patient was adopted, unclear family history.  For to Neurology for bilateral lower extremity weakness of unclear etiology.    Patient states weakness affects him most when he is getting up and going around.  Also endorsing a right side upper extremity coldness.  States he has not had a stroke however was told he had TIAs in the past.  Endorsed episode where he fell while walking outside and laid on the side of the street because he could not get up and walk.  Denies any changes in speech or swallow, does have dentures.    No significant changes to vision.  No difficulties with holding his head up.    Of note, patient is a .    Review of patient's allergies indicates:  No Known Allergies  Current Outpatient Medications   Medication Sig Dispense Refill    hydrALAZINE (APRESOLINE) 100 MG tablet Take 1 tablet (100 mg total) by mouth 3 (three) times daily. 270 tablet 3    NIFEdipine (PROCARDIA XL) 90 MG (OSM) 24 hr tablet Take 1 tablet (90 mg total) by mouth once daily. 90 tablet 3    baclofen (LIORESAL) 5 mg Tab tablet Take 0.5 tablets (2.5 mg total) by mouth once daily. (Patient not taking: Reported on 9/23/2024) 30 tablet 3    OLANZapine (ZYPREXA) 2.5 MG tablet Take 1 tablet (2.5 mg total) by mouth every evening. (Patient not taking: Reported on 9/23/2024) 30 tablet 11     No current facility-administered medications for this visit.        Medical History     Past Medical History:   Diagnosis Date    Cancer     Diabetes mellitus, type 2     Diabetic foot ulcer associated with diabetes mellitus due to underlying condition 07/16/2020    ESRD on hemodialysis     Hyperlipidemia     Hypertension     Obese      Past Surgical History:   Procedure Laterality Date    AV FISTULA PLACEMENT Left 07/06/2023    Procedure: CREATION, AV FISTULA;  Surgeon: Daniel Poon MD;  Location: [facility] OR;  Service: Vascular;  Laterality: Left;  RN PREOP 6/28/2023  LABS DAY OF SURGERY    BONE BIOPSY Left 07/17/2020    Procedure: BIOPSY, BONE;  Surgeon: Verona Whitmore DPM;  Location: [facility] OR;  Service: Podiatry;  Laterality: Left;    CERVICAL DISC SURGERY  07/11/2016    COLONOSCOPY N/A 3/25/2024    Procedure: COLONOSCOPY;  Surgeon: Roopa Pérez MD;  Location: [facility] (4TH FLR);  Service: Endoscopy;  Laterality: N/A;  Ref By: Dr. Pérez   constipation prep  Diaylsis Patient Lab has been ordered  3/20-precall complete-MS    COLONOSCOPY N/A 4/8/2024    Procedure: COLONOSCOPY;  Surgeon: Ropoa Péerz MD;  Location: [facility] (2ND FLR);  Service: Endoscopy;  Laterality: N/A;  Prep instructions sent via portal/given to pt in clinic  pt had to be r/s at  request  Dialysis Tues,Thurs,Sat-dw  Peg Prep-dw  4/2/24- Labs - dialysis /poor prep on 3/26 - PEG x 2 - extended prep /LVM for precall - ERW  4/5-LVM for precall-KPvt    COLONOSCOPY N/A 4/16/2024    Procedure: COLONOSCOPY;  Surgeon: Jarett Hill MD;  Location: [facility] (2ND FLR);  Service: Endoscopy;  Laterality: N/A;    DEBRIDEMENT Left 07/17/2020    Procedure: DEBRIDEMENT;  Surgeon: Verona Whitmore DPM;  Location: [facility] OR;  Service: Podiatry;  Laterality: Left;    DEBRIDEMENT OF FOOT Right     toes & plantar surface    ENDOBRONCHIAL ULTRASOUND N/A 3/1/2024    Procedure: ENDOBRONCHIAL ULTRASOUND (EBUS);  Surgeon: Francisco Fleming MD;  Location: [facility] OR 2ND FLR;  Service: Pulmonary;   Laterality: N/A;    ENDOSCOPIC MUCOSAL RESECTION OF COLON N/A 9/9/2024    Procedure: RESECTION, MUCOSA, COLON, ENDOSCOPIC;  Surgeon: Vikas Quinonez MD;  Location: [facility] (2ND FLR);  Service: Endoscopy;  Laterality: N/A;  6/11 portal-peg-dialysis T Th Sat-+K scheduled-.colonoscopy in 3 months with AES doc, [facility], removal of TI polyp and assess prior EMR sites. Cristiano-tt  7/19/24: rescheduled instructions sent via portal-GD  9/3-lvm for pre call-tb  9/4-pre call complete-    ESOPHAGEAL MANOMETRY WITH MEASUREMENT OF IMPEDANCE N/A 03/27/2023    Procedure: MANOMETRY, ESOPHAGUS, WITH IMPEDANCE MEASUREMENT;  Surgeon: Roopa Pérez MD;  Location: [facility] (4TH FLR);  Service: Endoscopy;  Laterality: N/A;  Belching and rumination protocol please  instructions via portal - sm    ESOPHAGOGASTRODUODENOSCOPY N/A 12/16/2022    Procedure: EGD (ESOPHAGOGASTRODUODENOSCOPY);  Surgeon: Eren Francois MD;  Location: [facility];  Service: Endoscopy;  Laterality: N/A;  Pt. on Dialysis. K+ ordered prior to procedure.EC    ESOPHAGOGASTRODUODENOSCOPY N/A 12/5/2023    Procedure: EGD (ESOPHAGOGASTRODUODENOSCOPY);  Surgeon: Kash Johnston MD;  Location: [facility];  Service: Endoscopy;  Laterality: N/A;    FRACTURE SURGERY Right     medial ankle, metal plate present    INJECTION OF ANESTHETIC AGENT AROUND NERVE Right 2/2/2024    Procedure: BLOCK, NERVE STELLATE GANGLION *HOLD ASPIRIN 5 DAYS PRIOR*;  Surgeon: Gokul Gonzalez MD;  Location: [facility] PAIN [facility]GT;  Service: Pain Management;  Laterality: Right;  [phone]    INSERTION OF TUNNELED CENTRAL VENOUS CATHETER (CVC) WITH SUBCUTANEOUS PORT N/A 06/11/2021    Procedure: TUNNEL CATH INSERTION WITHOUT PORT;  Surgeon: Jose Manuel Diagnostic Provider;  Location: [facility] OR;  Service: Radiology;  Laterality: N/A;  11AM START---PHONE PREOP 6/10/21---COVID POSTIVE ON 7/2020---NO S/S    left leg orthopedic surgery Left     REVISION OF ARTERIOVENOUS FISTULA Left 3/14/2024    Procedure: REVISION, AV  FISTULA;  Surgeon: Daniel Poon MD;  Location: [facility];  Service: Vascular;  Laterality: Left;  RN preop 3/6/2024----NEED ORDERS    UPPER GASTROINTESTINAL ENDOSCOPY       Family History   Adopted: Yes   Problem Relation Name Age of Onset    Heart disease Father      Colon cancer Neg Hx      Esophageal cancer Neg Hx      Colon polyps Neg Hx      Stomach cancer Neg Hx       Social History     Socioeconomic History    Marital status:     Number of children: 1   Tobacco Use    Smoking status: Never    Smokeless tobacco: Never   Substance and Sexual Activity    Alcohol use: Not Currently     Comment: occ rare beer    Drug use: No    Sexual activity: Not Currently   Social History Narrative    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   Physical Activity: Inactive (6/5/2024)    Exercise Vital Sign     Days of Exercise per Week: 0 days     Minutes of Exercise per Session: 0 min   Stress: No Stress Concern Present (6/5/2024)    [employer] [place] [employer] - Occupational Stress Questionnaire     Feeling of Stress : Only a little   Recent Concern: Stress - Stress Concern Present (4/16/2024)    [employer] [place] [employer] - Occupational Stress Questionnaire     Feeling of Stress : To some extent   Housing Stability: High Risk (6/5/2024)    Housing Stability Vital Sign     Unable to Pay for Housing in the Last Year: No     Homeless in the Last  Year: Yes       Exam     Vitals:    09/23/24 0823   BP: (!) 178/85   Pulse: 76      Physical Exam:  General: Not in acute distress. Not ill-appearing.   HENT: Normocephalic and atraumatic. Moist mucous membranes.  Eyes: Conjunctivae normal.   Pulmonary: Pulmonary effort is normal.   Abdominal: Abdomen is soft and flat.   Skin: Skin is warm and dry. No rashes.   Psychiatric: Mood normal.        Neurologic Exam   Mental status: oriented to person, place, and time  Attention: Normal. Concentration: normal.  Speech: speech is normal.  Cranial Nerves: EOMI intact, V1-V3 Facial sensation intact. Symmetric facies. Hearing grossly intact. Palate and uvula midline, symmetric. No tongue deviation. Trapezius strength intact.     Motor exam:  Strength 4/5 in wrist flexion/extension (?)  And finger adduction/abduction.  Otherwise  Strength 5/5 in bilateral upper extremities: deltoids, biceps, triceps.    Strength 4/5 in bilateral lower hip flexion, otherwise Strength 5/5 in bilateral lower extremities: thigh adduction/abduction, knee flexion/extension, dorsiflexion/plantarflexion, foot eversion/inversion.    Reflexes: 2+ in bilateral upper extremities: biceps and brachiaradialis    Sensory exam: light touch intact and lower extremities.    Gait exam:  Abnormal, secondary to weakness, unsteady    Tremor: none  Cogwheel rigidity: none    Labs and Imaging     Labs: reviewed  No results found for this or any previous visit (from the past 24 hour(s)).    Thyroid normal  HgA1C%:  4.1  LDL:  135    Antimitochondrial anti smooth muscle antibodies were ordered which were unremarkable, labs from December 20, 2023 with CK, CASPR2, LG 1 antibodies, paraneoplastic panel were also negative    Imaging:   I have personally reviewed the images performed.   PET scan done January 20, 2024, showed hypermetabolic lymphadenopathy was concerning findings for diffuse metastasis  MRI C-spine/T-spine/L-spine obtained from December in 2023, showed C-spine  changes with multilevel disc degenerative disease and postsurgical changes from an ACDF from C5-C7.  Showed subtle cord changes.  T-spine reviewed.  L-spine shows possible osseous metastatic disease involving L1 as noted in report.  MRI of the brain showed bilateral thalamic lacunar infarcts with chronic microvascular changes and mild generalized cerebral volume loss    Assessment and Plan     Problem List Items Addressed This Visit          Neuro    Abnormal MRI, lumbar spine    Hx of completed stroke       Cardiac/Vascular    HTN (hypertension)       Orthopedic    Weakness of extremity - Primary    Relevant Orders    Ambulatory referral/consult to Cardiology    Deep muscle biopsy    Myositis Specific 11 AB Panel    Ambulatory referral/consult to Neurosurgery    EMG W/ ULTRASOUND AND NERVE CONDUCTION TEST 4 Extremities    CK    Myasthenia Gravis/Lambert-Eaton    Inclusion body myositis       Other    Debility    Diffuse lymphadenopathy     59-year-old male with complicated medical history, has been undergoing extensive workup with Heme-Onc for evaluation for malignancy.  History of hypertension, CKD on dialysis.  Abnormal findings on imaging with L1/osseous lesions.    Patient being referred over to Neurology for bilateral lower extremity weakness.  Weakness of bilateral hand flexors with finger adduction and abduction, bilateral hip flexors.  We will work patient up for an inclusion body myositis, get myositis panel.  With plans to also a deep muscle biopsy of the vastus muscles to look underlying pathology.  Also get nerve conduction studies/EMG given that he endorses right upper extremity coldness, we will also evaluate for myopathic units.  Also get myasthenia panel given there were concerns on hospitalization back in December of 2023.  We will also place referral to Cardiology given he had some myositis or myopathic process, does have for cardiomegaly or irregular rhythms.  Unclear family history given that he  is adopted.    However, patient with complicated history, significant multifactorial findings given he endorses weight loss, fatigue, is being worked up for an underlying malignancy.    Of note, patient is hypertensive today systolics 178/85 on recheck, denies any neurologic acute deficits.  Discussed stroke-like concerns to present to the ER.  Patient does have chronic bilateral thalamic lacunar infarcts on MRI of the brain in December of 2023, likely etiology of that is small-vessel disease with his uncontrolled hypertension.      Follow-up:  Continue follow up with Heme-Onc, to continue follow up with primary care.  We will also see me for nerve conduction studies/EMG.  Message to help facilitate patient's muscle biopsy to look at pathology as well.  Plans to see this patient on nerve conduction studies/EMG.  Patient will also have gotten labs by then.    Appreciate opportunity to care for this patient.    Time spent on this encounter:  90 minutes. This includes face to face time and non-face to face time preparing to see the patient (eg, review of tests), obtaining and/or reviewing separately obtained history, documenting clinical information in the electronic or other health record, independently interpreting results and communicating results to the patient/family/caregiver, or care coordinator.     This note was created by combination of typed  and M-Modal dictation. Transcription and phonetic errors may be present.  If there are any questions, please contact me.

## 2024-09-24 ENCOUNTER — PATIENT MESSAGE (OUTPATIENT)
Dept: ADMINISTRATIVE | Facility: OTHER | Age: 59
End: 2024-09-24
Payer: COMMERCIAL

## 2024-09-24 NOTE — PROGRESS NOTES
CHW - Outreach Attempt    Oliva Viramontes Community Health Worker left a voicemail message for 1st attempt to contact patient regarding: Community Health.  CHW sent Pt an EPIC  message on how to contact [employer] and to apply for DSNAP if he needs it.   Community Health Worker to attempt to contact patient on:  September 30, 2024.

## 2024-09-30 ENCOUNTER — PATIENT OUTREACH (OUTPATIENT)
Dept: ADMINISTRATIVE | Facility: OTHER | Age: 59
End: 2024-09-30
Payer: COMMERCIAL

## 2024-09-30 NOTE — PROGRESS NOTES
CHW - Outreach Attempt    Oliva Viramontes Community Health Worker left a voicemail message for 2nd attempt to contact patient regarding: [facility]   Community Health Worker to attempt to contact patient on: October 6, 2024

## 2024-10-02 ENCOUNTER — TELEPHONE (OUTPATIENT)
Dept: NEUROLOGY | Facility: CLINIC | Age: 59
End: 2024-10-02
Payer: COMMERCIAL

## 2024-10-02 ENCOUNTER — TELEPHONE (OUTPATIENT)
Dept: PODIATRY | Facility: CLINIC | Age: 59
End: 2024-10-02
Payer: COMMERCIAL

## 2024-10-02 NOTE — TELEPHONE ENCOUNTER
----- Message from Jl sent at 10/2/2024  6:54 AM CDT -----  Name Of Caller:Catalino        Provider Name:Aviva Martinez         Does patient feel the need to be seen today? no        Relationship to the Pt?: patient        Contact Preference?:   [phone]         What is the nature of the call?: Patient has a wound care appointment for today at 7:15am, patient states that he needs to speak with someone in the office to get this appointment rescheduled.    Appointment scheduled with patient

## 2024-10-04 ENCOUNTER — OFFICE VISIT (OUTPATIENT)
Dept: NEUROSURGERY | Facility: CLINIC | Age: 59
End: 2024-10-04
Attending: STUDENT IN AN ORGANIZED HEALTH CARE EDUCATION/TRAINING PROGRAM
Payer: COMMERCIAL

## 2024-10-04 VITALS
BODY MASS INDEX: 25.31 KG/M2 | OXYGEN SATURATION: 97 % | SYSTOLIC BLOOD PRESSURE: 147 MMHG | HEART RATE: 84 BPM | WEIGHT: 190.94 LBS | DIASTOLIC BLOOD PRESSURE: 74 MMHG | HEIGHT: 73 IN

## 2024-10-04 DIAGNOSIS — R29.898 WEAKNESS OF EXTREMITY: ICD-10-CM

## 2024-10-04 NOTE — PROGRESS NOTES
[facility]  Neurosurgery    SUBJECTIVE:     History of Present Illness:  Catalino Mcfadden is a 59 y.o. male past medical history significant for prior C5 through C7 ACDF who presents with a chronic worsening of weakness.  He is being worked up for inclusion body myositis by Dr. Brandi Kaye, who has requested a quadriceps muscle biopsy.    Patient states that his weakness has been going on for years and years.  He has had some weight loss and has been worked up for cancer with a recent CT chest abdomen pelvis.  This did not show anything.  He has had bone biopsy, lymph node biopsy, bronchoscopy without any malignancy identified.  He is still pending a bone marrow biopsy, but had some follow up issues with oncology related to this.    Patient states his weight loss has halted and he has actually started to put some weight back on.  However, he has a hard time getting up from a chair.  He has a hard time walking.  He has right upper extremity pins and needles as if his arm is asleep.  He also states that he does have poor balance.    (Not in a hospital admission)      Review of patient's allergies indicates:  No Known Allergies    Past Medical History:   Diagnosis Date    Cancer     Diabetes mellitus, type 2     Diabetic foot ulcer associated with diabetes mellitus due to underlying condition 07/16/2020    ESRD on hemodialysis     Hyperlipidemia     Hypertension     Obese      Past Surgical History:   Procedure Laterality Date    AV FISTULA PLACEMENT Left 07/06/2023    Procedure: CREATION, AV FISTULA;  Surgeon: Daniel Poon MD;  Location: [facility] OR;  Service: Vascular;  Laterality: Left;  RN PREOP 6/28/2023  LABS DAY OF SURGERY    BONE BIOPSY Left 07/17/2020    Procedure: BIOPSY, BONE;  Surgeon: Verona Whitmore DPM;  Location: [facility] OR;  Service: Podiatry;  Laterality: Left;    CERVICAL DISC SURGERY  07/11/2016    COLONOSCOPY N/A 3/25/2024    Procedure: COLONOSCOPY;  Surgeon: Roopa Pérez MD;   Location: [facility] (4TH FLR);  Service: Endoscopy;  Laterality: N/A;  Ref By: Dr. Pérez   constipation prep  Diaylsis Patient Lab has been ordered  3/20-precall complete-MS    COLONOSCOPY N/A 4/8/2024    Procedure: COLONOSCOPY;  Surgeon: Roopa Pérez MD;  Location: [facility] (2ND FLR);  Service: Endoscopy;  Laterality: N/A;  Prep instructions sent via portal/given to pt in clinic  pt had to be r/s at  request  Dialysis Tues,Thurs,Sat-dw  Peg Prep-dw  4/2/24- Labs - dialysis /poor prep on 3/26 - PEG x 2 - extended prep /LVM for precall - ERW  4/5-LVM for precall-KPvt    COLONOSCOPY N/A 4/16/2024    Procedure: COLONOSCOPY;  Surgeon: Jarett Hill MD;  Location: [facility] (2ND FLR);  Service: Endoscopy;  Laterality: N/A;    DEBRIDEMENT Left 07/17/2020    Procedure: DEBRIDEMENT;  Surgeon: Verona Whitmore DPM;  Location: [facility] OR;  Service: Podiatry;  Laterality: Left;    DEBRIDEMENT OF FOOT Right     toes & plantar surface    ENDOBRONCHIAL ULTRASOUND N/A 3/1/2024    Procedure: ENDOBRONCHIAL ULTRASOUND (EBUS);  Surgeon: Francisco Fleming MD;  Location: [facility] 2ND FLR;  Service: Pulmonary;  Laterality: N/A;    ENDOSCOPIC MUCOSAL RESECTION OF COLON N/A 9/9/2024    Procedure: RESECTION, MUCOSA, COLON, ENDOSCOPIC;  Surgeon: Vikas Quinonez MD;  Location: [facility] (2ND FLR);  Service: Endoscopy;  Laterality: N/A;  6/11 portal-peg-dialysis T Th Sat-+K scheduled-.colonoscopy in 3 months with [facility] doc, [facility], removal of TI polyp and assess prior EMR sites. Cristiano-tt  7/19/24: rescheduled instructions sent via portal-GD  9/3-lvm for pre call-tb  9/4-pre call complete-    ESOPHAGEAL MANOMETRY WITH MEASUREMENT OF IMPEDANCE N/A 03/27/2023    Procedure: MANOMETRY, ESOPHAGUS, WITH IMPEDANCE MEASUREMENT;  Surgeon: Roopa Pérez MD;  Location: [facility] (4TH FLR);  Service: Endoscopy;  Laterality: N/A;  Belching and rumination protocol please  instructions via portal - sm    ESOPHAGOGASTRODUODENOSCOPY N/A  12/16/2022    Procedure: EGD (ESOPHAGOGASTRODUODENOSCOPY);  Surgeon: Eren Francois MD;  Location: [facility] ENDO;  Service: Endoscopy;  Laterality: N/A;  Pt. on Dialysis. K+ ordered prior to procedure.EC    ESOPHAGOGASTRODUODENOSCOPY N/A 12/5/2023    Procedure: EGD (ESOPHAGOGASTRODUODENOSCOPY);  Surgeon: Kash Johnston MD;  Location: [facility] ENDO;  Service: Endoscopy;  Laterality: N/A;    FRACTURE SURGERY Right     medial ankle, metal plate present    INJECTION OF ANESTHETIC AGENT AROUND NERVE Right 2/2/2024    Procedure: BLOCK, NERVE STELLATE GANGLION *HOLD ASPIRIN 5 DAYS PRIOR*;  Surgeon: Gokul Gonzalez MD;  Location: [facility] PAIN MGT;  Service: Pain Management;  Laterality: Right;  [phone]    INSERTION OF TUNNELED CENTRAL VENOUS CATHETER (CVC) WITH SUBCUTANEOUS PORT N/A 06/11/2021    Procedure: TUNNEL CATH INSERTION WITHOUT PORT;  Surgeon: Dosc Diagnostic Provider;  Location: [facility] OR;  Service: Radiology;  Laterality: N/A;  11AM START---PHONE PREOP 6/10/21---COVID POSTIVE ON 7/2020---NO S/S    left leg orthopedic surgery Left     REVISION OF ARTERIOVENOUS FISTULA Left 3/14/2024    Procedure: REVISION, AV FISTULA;  Surgeon: Daniel Poon MD;  Location: [facility] OR;  Service: Vascular;  Laterality: Left;  RN preop 3/6/2024----NEED ORDERS    UPPER GASTROINTESTINAL ENDOSCOPY       Family History   Adopted: Yes   Problem Relation Name Age of Onset    Heart disease Father      Colon cancer Neg Hx      Esophageal cancer Neg Hx      Colon polyps Neg Hx      Stomach cancer Neg Hx       Social History     Tobacco Use    Smoking status: Never    Smokeless tobacco: Never   Substance Use Topics    Alcohol use: Not Currently     Comment: occ rare beer    Drug use: No        Review of Systems:  As noted in HPI    OBJECTIVE:     Vital Signs (Most Recent):  Pulse: 84 (10/04/24 1046)  BP: (!) 147/74 (10/04/24 1046)  SpO2: 97 % (10/04/24 1046)    Physical Exam:  General: well developed, well nourished, no distress  Head:  normocephalic, atraumatic  Neurologic: Alert and oriented. Thought content appropriate  Speech: Fluent  Cranial nerves: face symmetric   Eyes: pupils equal  Pulmonary: normal respirations  Sensory: intact to light touch throughout  Motor Strength: Moves all extremities spontaneously with good tone.  Full strength upper and lower extremities. No abnormal movements seen.      Delt Bi Tri Wrist ext.  IO  RUE:   5    5   5        5          4    4  LUE:      5    5   5        5          4    4   HF KE EHL DF PF  RLE   4    5     5     5    5  LLE:  4    5     5     5    5    DTR's - 1+ patellar on right, 2+ patellar on left  Musa: absent  Clonus: absent    Gait:  With cane, labored.  Has a hard time getting on exam table    Diagnostic Results:  I have personally reviewed imaging. My impression is as follows.    I reviewed patient's MRI of the cervical spine which is dated 12/12/2023.  There is postoperative change from prior C5-C7 ACDF.  Despite the ACDF, there is persistent osteophyte on the left at the C7 level as well as the C6 level which does cause some spinal cord contact at least.  There is also signal change on the left at the C2 level without any spinal cord compression as well as on the right at the C3 level without any spinal cord compression.  These do not necessarily look like myelopathic changes, but could reflect some inflammatory condition.    ASSESSMENT/PLAN:     Catalino Mcfadden is a 59 y.o. 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 versus other inflammatory condition versus cervical myelopathy  -acute intervention is not indicated.  Patient's symptoms have been going on for over a year  -planning for left vastus lateralis muscle biopsy 11/04/2024  -risks benefits indications alternatives of the procedure discussed with the patient and he would like to proceed  -counseled patient he could have underlying cervical myelopathy as well and may need new cervical imaging  -also could consider  lumbar puncture in future considering patient's abnormal cord signal not corresponding with cord compression    Please feel free to call with any further questions.    Time spent on this encounter: 40 minutes. This includes face-to-face time and non-face to face time preparing to see the patient (eg, review of tests), obtaining and/or reviewing separately obtained history, documenting clinical information in the electronic or other health record, independently interpreting results and communicating results to the patient/family/caregiver, or care coordinator.      Adithya PATEL Mangham [facility]  Department of Neurosurgery  [phone]    Disclaimer: This note was dictated by speech recognition. Minor errors in transcription may be present.  Please call with any questions.

## 2024-10-05 ENCOUNTER — PATIENT OUTREACH (OUTPATIENT)
Dept: EMERGENCY MEDICINE | Facility: HOSPITAL | Age: 59
End: 2024-10-05
Payer: COMMERCIAL

## 2024-10-05 NOTE — PROGRESS NOTES
Patient outreached, left voicemail with appointment details and call back in the event patient has any questions or concerns.

## 2024-10-07 ENCOUNTER — PATIENT OUTREACH (OUTPATIENT)
Dept: ADMINISTRATIVE | Facility: OTHER | Age: 59
End: 2024-10-07
Payer: COMMERCIAL

## 2024-10-07 ENCOUNTER — ANESTHESIA EVENT (OUTPATIENT)
Dept: SURGERY | Facility: HOSPITAL | Age: 59
End: 2024-10-07
Payer: COMMERCIAL

## 2024-10-07 ENCOUNTER — OFFICE VISIT (OUTPATIENT)
Dept: INTERNAL MEDICINE | Facility: CLINIC | Age: 59
End: 2024-10-07
Payer: COMMERCIAL

## 2024-10-07 VITALS
HEART RATE: 81 BPM | OXYGEN SATURATION: 100 % | WEIGHT: 189.13 LBS | BODY MASS INDEX: 25.07 KG/M2 | HEIGHT: 73 IN | SYSTOLIC BLOOD PRESSURE: 200 MMHG | DIASTOLIC BLOOD PRESSURE: 90 MMHG

## 2024-10-07 DIAGNOSIS — K74.60 HEPATIC CIRRHOSIS, UNSPECIFIED HEPATIC CIRRHOSIS TYPE, UNSPECIFIED WHETHER ASCITES PRESENT: ICD-10-CM

## 2024-10-07 DIAGNOSIS — Z99.2 ESRD ON HEMODIALYSIS: ICD-10-CM

## 2024-10-07 DIAGNOSIS — R74.8 ELEVATED ALKALINE PHOSPHATASE LEVEL: ICD-10-CM

## 2024-10-07 DIAGNOSIS — N18.6 ESRD ON HEMODIALYSIS: ICD-10-CM

## 2024-10-07 DIAGNOSIS — Z09 FOLLOW-UP EXAM: Primary | ICD-10-CM

## 2024-10-07 DIAGNOSIS — I10 PRIMARY HYPERTENSION: ICD-10-CM

## 2024-10-07 DIAGNOSIS — I15.0 MALIGNANT RENOVASCULAR HYPERTENSION: ICD-10-CM

## 2024-10-07 DIAGNOSIS — E78.5 HYPERLIPIDEMIA, ACQUIRED: ICD-10-CM

## 2024-10-07 PROCEDURE — 3044F HG A1C LEVEL LT 7.0%: CPT | Mod: CPTII,S$GLB,, | Performed by: INTERNAL MEDICINE

## 2024-10-07 PROCEDURE — 99214 OFFICE O/P EST MOD 30 MIN: CPT | Mod: S$GLB,,, | Performed by: INTERNAL MEDICINE

## 2024-10-07 PROCEDURE — 3080F DIAST BP >= 90 MM HG: CPT | Mod: CPTII,S$GLB,, | Performed by: INTERNAL MEDICINE

## 2024-10-07 PROCEDURE — 99999 PR PBB SHADOW E&M-EST. PATIENT-LVL IV: CPT | Mod: PBBFAC,,, | Performed by: INTERNAL MEDICINE

## 2024-10-07 PROCEDURE — 1159F MED LIST DOCD IN RCRD: CPT | Mod: CPTII,S$GLB,, | Performed by: INTERNAL MEDICINE

## 2024-10-07 PROCEDURE — 1160F RVW MEDS BY RX/DR IN RCRD: CPT | Mod: CPTII,S$GLB,, | Performed by: INTERNAL MEDICINE

## 2024-10-07 PROCEDURE — 3066F NEPHROPATHY DOC TX: CPT | Mod: CPTII,S$GLB,, | Performed by: INTERNAL MEDICINE

## 2024-10-07 PROCEDURE — 3008F BODY MASS INDEX DOCD: CPT | Mod: CPTII,S$GLB,, | Performed by: INTERNAL MEDICINE

## 2024-10-07 PROCEDURE — 3077F SYST BP >= 140 MM HG: CPT | Mod: CPTII,S$GLB,, | Performed by: INTERNAL MEDICINE

## 2024-10-07 RX ORDER — CARVEDILOL 6.25 MG/1
6.25 TABLET ORAL 2 TIMES DAILY WITH MEALS
Qty: 180 TABLET | Refills: 1 | Status: SHIPPED | OUTPATIENT
Start: 2024-10-07 | End: 2025-04-05

## 2024-10-07 NOTE — PROGRESS NOTES
CHW - Follow Up    This Community Health Worker completed a follow up visit with patient via telephone today.  CHW updated his social determinants of health questionnaire.   Pt/Caregiver reported: Pt  reported that he is doing better good and waiting on his SSI benefits to be released soon.    Community Health Worker provided:  CHW will call Pt back in two weeks to see if he has some unmet needs.     Follow-up Outreach - Due: 10/21/2024

## 2024-10-07 NOTE — PROGRESS NOTES
Subjective:       Patient ID: Catalino Mcfadden is a 59 y.o. male.    Chief Complaint: Follow-up      HPI  Catalino Mcfadden is a 59 y.o. year old male established patient presents for follow up. Blood pressure elevated today in clinic, states he took his medications late. His last HD was Saturday. Did eat red beans and rice last night (canned). Since last visit, has had no new complaints. Did go see neurology and they are working up myositis, planned for EMG/NCS and muscle biopsy. He does have an appointment with hepatology next week.     Review of Systems   Constitutional:  Negative for activity change, appetite change, chills, fatigue, fever and unexpected weight change.   HENT:  Negative for congestion, rhinorrhea and sore throat.    Eyes:  Negative for visual disturbance.   Respiratory:  Negative for shortness of breath.    Cardiovascular:  Negative for chest pain.   Gastrointestinal:  Negative for abdominal pain, diarrhea, nausea and vomiting.   Genitourinary:  Negative for difficulty urinating and dysuria.   Musculoskeletal:  Negative for arthralgias, back pain and myalgias.   Skin:  Negative for color change and rash.   Neurological:  Negative for dizziness, weakness and headaches.         Past Medical History:   Diagnosis Date    Cancer     Diabetes mellitus, type 2     Diabetic foot ulcer associated with diabetes mellitus due to underlying condition 07/16/2020    ESRD on hemodialysis     Hyperlipidemia     Hypertension     Obese         Prior to Admission medications    Medication Sig Start Date End Date Taking? Authorizing Provider   hydrALAZINE (APRESOLINE) 100 MG tablet Take 1 tablet (100 mg total) by mouth 3 (three) times daily. 3/28/24 3/28/25 Yes Mary Jo Villatoro MD   NIFEdipine (PROCARDIA XL) 90 MG (OSM) 24 hr tablet Take 1 tablet (90 mg total) by mouth once daily. 6/12/24 6/12/25 Yes Samreen Salvador PA-C   baclofen (LIORESAL) 5 mg Tab tablet Take 0.5 tablets (2.5 mg total) by mouth once daily. 8/23/24    "Holger Crane MD   OLANZapine (ZYPREXA) 2.5 MG tablet Take 1 tablet (2.5 mg total) by mouth every evening. 8/23/24 8/23/25  Holger Crane MD        Past medical history, surgical history, and family medical history reviewed and updated as appropriate.    Medications and allergies reviewed.     Objective:          Vitals:    10/07/24 1103 10/07/24 1110   BP: (!) 220/90 (!) 200/90   BP Location: Right arm    Patient Position: Sitting    Pulse: 81    SpO2: 100%    Weight: 85.8 kg (189 lb 2.5 oz)    Height: 6' 1" (1.854 m)      Body mass index is 24.96 kg/m².  Physical Exam  Constitutional:       General: He is not in acute distress.     Appearance: He is well-developed.   HENT:      Head: Normocephalic and atraumatic.      Nose: Nose normal.   Eyes:      General: No scleral icterus.     Extraocular Movements: Extraocular movements intact.   Neck:      Thyroid: No thyromegaly.      Vascular: No JVD.      Trachea: No tracheal deviation.   Cardiovascular:      Rate and Rhythm: Normal rate and regular rhythm.      Heart sounds: Normal heart sounds. No murmur heard.     No friction rub. No gallop.   Pulmonary:      Effort: Pulmonary effort is normal. No respiratory distress.      Breath sounds: Normal breath sounds. No wheezing or rales.   Abdominal:      General: Bowel sounds are normal. There is no distension.      Palpations: Abdomen is soft. There is no mass.      Tenderness: There is no abdominal tenderness.   Musculoskeletal:         General: No tenderness. Normal range of motion.      Cervical back: Normal range of motion and neck supple.   Lymphadenopathy:      Cervical: No cervical adenopathy.   Skin:     General: Skin is warm and dry.      Findings: No rash.   Neurological:      Mental Status: He is alert and oriented to person, place, and time.      Cranial Nerves: No cranial nerve deficit.      Motor: Weakness (generalized) present.      Deep Tendon Reflexes: Reflexes normal.   Psychiatric:         Behavior: " Behavior normal.         Lab Results   Component Value Date    WBC 3.56 (L) 08/26/2024    HGB 10.8 (L) 08/26/2024    HCT 34.5 (L) 08/26/2024     08/26/2024    CHOL 217 (H) 05/06/2024    TRIG 54 05/06/2024    HDL 71 05/06/2024    ALT 30 08/26/2024    AST 60 (H) 08/26/2024     (L) 08/26/2024    K 4.3 09/09/2024    CL 96 08/26/2024    CREATININE 5.3 (H) 08/26/2024    BUN 35 (H) 08/26/2024    CO2 28 08/26/2024    TSH 1.736 05/06/2024    PSA 0.90 08/26/2024    INR 1.1 02/26/2024    HGBA1C 4.1 05/06/2024       Assessment:       1. Follow-up exam    2. Primary hypertension    3. Malignant renovascular hypertension    4. ESRD on hemodialysis    5. Elevated alkaline phosphatase level    6. Hepatic cirrhosis, unspecified hepatic cirrhosis type, unspecified whether ascites present    7. Hyperlipidemia, acquired          Plan:     Catalino was seen today for follow-up.    Diagnoses and all orders for this visit:    Follow-up exam    Primary hypertension  Comments:  uncontrolled, resume coreg 6.25 mg bid, persistent elevation. pt to monitor pressures at home and with dialysis.  Orders:  -     carvediloL (COREG) 6.25 MG tablet; Take 1 tablet (6.25 mg total) by mouth 2 (two) times daily with meals.    Malignant renovascular hypertension  Comments:  denies chest pain, shortness of breath, headache. Took blood pressure medication late, has dialysis tomorrow.  Orders:  -     carvediloL (COREG) 6.25 MG tablet; Take 1 tablet (6.25 mg total) by mouth 2 (two) times daily with meals.    ESRD on hemodialysis    Elevated alkaline phosphatase level    Hepatic cirrhosis, unspecified hepatic cirrhosis type, unspecified whether ascites present    Hyperlipidemia, acquired    Has HD tomorrow  Will restart coreg  Pt to monitor blood pressures closely  ER precautions discussed.     Health maintenance reviewed with patient.   Follow up in about 4 months (around 2/7/2025).    Chauncey Perez MD  Internal Medicine / Primary Care  [facility]  10/7/2024

## 2024-10-07 NOTE — PATIENT INSTRUCTIONS
Start carvedilol 6.25 mg 1 tablet twice a day. Continue nifedipine and hydralazine as well.     Schedule appointment with vascular surgery (Dr. Poon)  Send message or call hepatology clinic to let them know you have a procedure on same day as your EMG test, see if they can help reschedule to a later time period.     Vaccinations recommended -   Tetanus shot today  Shingles vaccination series  Flu vaccination  Covid-19 vaccination    Return to clinic in 4 months or sooner if needed.

## 2024-10-11 ENCOUNTER — TELEPHONE (OUTPATIENT)
Dept: NEUROLOGY | Facility: CLINIC | Age: 59
End: 2024-10-11
Payer: COMMERCIAL

## 2024-10-11 ENCOUNTER — PATIENT OUTREACH (OUTPATIENT)
Dept: EMERGENCY MEDICINE | Facility: HOSPITAL | Age: 59
End: 2024-10-11
Payer: COMMERCIAL

## 2024-10-14 ENCOUNTER — TELEPHONE (OUTPATIENT)
Dept: PREADMISSION TESTING | Facility: HOSPITAL | Age: 59
End: 2024-10-14
Payer: COMMERCIAL

## 2024-10-15 ENCOUNTER — TELEPHONE (OUTPATIENT)
Dept: VASCULAR SURGERY | Facility: CLINIC | Age: 59
End: 2024-10-15
Payer: COMMERCIAL

## 2024-10-15 NOTE — TELEPHONE ENCOUNTER
----- Message from Jean Marie sent at 10/15/2024 11:06 AM CDT -----  Regarding: fresenius  Type: Patient Call Back    Who called:kanika davidson     What is the request in detail:needing the pt to be seen, having prolonged bleeding while at dialysis, they helped at the site but its been pushing back     Can the clinic reply by [facility]?no    Would the patient rather a call back or a response via [facility]? Callback     Best call back number:[phone]    Additional Information:

## 2024-10-15 NOTE — TELEPHONE ENCOUNTER
Called and spoke with pt. States when he gets his dialysis treatment on T,Th,Sat.have problem with bleeding after for approx.15 min.and dialysis wants him to see provider. Called [facility] @ [phone] and nurse unavailable. Instructed pt. If has any issue with bleeding, swelling,pain,numbness,coolness,etc, he needs to go to the ER prior to calling him back with appt. Pt.verbalized understanding.-10/16/2024 Spoke with  and called pt.as requested by provider regarding:having fistulogram procedure. Pt.states  has dialysis on T/TH/Sat. and will check with dialysis to see if can adjust treatment and let us know. I called dialysis center and spoke with Chayito vaz and she said they will adjust his treatment. He has treatment early in morning and sometimes have to hold pressure for couple hrs.post treatment. Message sent to provider to see when pt.to have procedure..

## 2024-10-16 ENCOUNTER — PATIENT MESSAGE (OUTPATIENT)
Dept: PODIATRY | Facility: CLINIC | Age: 59
End: 2024-10-16
Payer: COMMERCIAL

## 2024-10-16 ENCOUNTER — TELEPHONE (OUTPATIENT)
Dept: VASCULAR SURGERY | Facility: CLINIC | Age: 59
End: 2024-10-16
Payer: COMMERCIAL

## 2024-10-17 ENCOUNTER — TELEPHONE (OUTPATIENT)
Dept: VASCULAR SURGERY | Facility: CLINIC | Age: 59
End: 2024-10-17
Payer: COMMERCIAL

## 2024-10-17 NOTE — TELEPHONE ENCOUNTER
Called pt.to confirm fistulogram procedure with  on 10/31/2024 at [facility] for 12:30 pm. Arrival time 10:30am [facility] second [facility]. Someione will need to drive him home post procedure. Someone will call afternoon prior to confirm times. Pt.npo after midnight the night prior to procedure. Phone pre-op 10/29/2024. Called pt. And no answer. Left message to call our office. Called dialysis center and left message for nurse Chayito to call to let her know plan. Pt.has dialysis treatments on T/TH/Sat and will need treatments adjusted to have procedure.

## 2024-10-18 ENCOUNTER — PATIENT OUTREACH (OUTPATIENT)
Dept: EMERGENCY MEDICINE | Facility: HOSPITAL | Age: 59
End: 2024-10-18
Payer: COMMERCIAL

## 2024-10-22 ENCOUNTER — HOSPITAL ENCOUNTER (INPATIENT)
Facility: HOSPITAL | Age: 59
LOS: 2 days | Discharge: HOME | DRG: 640 | End: 2024-10-25
Attending: STUDENT IN AN ORGANIZED HEALTH CARE EDUCATION/TRAINING PROGRAM | Admitting: STUDENT IN AN ORGANIZED HEALTH CARE EDUCATION/TRAINING PROGRAM
Payer: COMMERCIAL

## 2024-10-22 DIAGNOSIS — R10.9 ABDOMINAL PAIN, UNSPECIFIED ABDOMINAL LOCATION: ICD-10-CM

## 2024-10-22 DIAGNOSIS — R07.9 CHEST PAIN: ICD-10-CM

## 2024-10-22 DIAGNOSIS — N18.6 ESRD ON HEMODIALYSIS: ICD-10-CM

## 2024-10-22 DIAGNOSIS — R10.9 ABDOMINAL PAIN: ICD-10-CM

## 2024-10-22 DIAGNOSIS — E87.1 HYPONATREMIA: Primary | ICD-10-CM

## 2024-10-22 DIAGNOSIS — Z99.2 ESRD ON HEMODIALYSIS: ICD-10-CM

## 2024-10-22 DIAGNOSIS — N18.6 ESRD (END STAGE RENAL DISEASE): ICD-10-CM

## 2024-10-22 LAB
ALBUMIN SERPL BCP-MCNC: 2.7 G/DL (ref 3.5–5.2)
ALP SERPL-CCNC: 587 U/L (ref 40–150)
ALT SERPL W/O P-5'-P-CCNC: 35 U/L (ref 10–44)
ANION GAP SERPL CALC-SCNC: 11 MMOL/L (ref 8–16)
ANION GAP SERPL CALC-SCNC: 5 MMOL/L (ref 8–16)
AST SERPL-CCNC: 63 U/L (ref 10–40)
BASOPHILS # BLD AUTO: 0.02 K/UL (ref 0–0.2)
BASOPHILS NFR BLD: 0.6 % (ref 0–1.9)
BILIRUB SERPL-MCNC: 2 MG/DL (ref 0.1–1)
BUN SERPL-MCNC: 14 MG/DL (ref 6–20)
BUN SERPL-MCNC: 30 MG/DL (ref 6–30)
BUN SERPL-MCNC: 31 MG/DL (ref 6–20)
CALCIUM SERPL-MCNC: 8.2 MG/DL (ref 8.7–10.5)
CALCIUM SERPL-MCNC: 8.8 MG/DL (ref 8.7–10.5)
CHLORIDE SERPL-SCNC: 90 MMOL/L (ref 95–110)
CHLORIDE SERPL-SCNC: 91 MMOL/L (ref 95–110)
CHLORIDE SERPL-SCNC: 99 MMOL/L (ref 95–110)
CO2 SERPL-SCNC: 23 MMOL/L (ref 23–29)
CO2 SERPL-SCNC: 24 MMOL/L (ref 23–29)
CREAT SERPL-MCNC: 3.4 MG/DL (ref 0.5–1.4)
CREAT SERPL-MCNC: 5.9 MG/DL (ref 0.5–1.4)
CREAT SERPL-MCNC: 6.4 MG/DL (ref 0.5–1.4)
DIFFERENTIAL METHOD BLD: ABNORMAL
EOSINOPHIL # BLD AUTO: 0.1 K/UL (ref 0–0.5)
EOSINOPHIL NFR BLD: 2.2 % (ref 0–8)
ERYTHROCYTE [DISTWIDTH] IN BLOOD BY AUTOMATED COUNT: 13.8 % (ref 11.5–14.5)
EST. GFR  (NO RACE VARIABLE): 10.2 ML/MIN/1.73 M^2
EST. GFR  (NO RACE VARIABLE): 19.8 ML/MIN/1.73 M^2
GLUCOSE SERPL-MCNC: 110 MG/DL (ref 70–110)
GLUCOSE SERPL-MCNC: 111 MG/DL (ref 70–110)
GLUCOSE SERPL-MCNC: 75 MG/DL (ref 70–110)
HBV SURFACE AG SERPL QL IA: NORMAL
HCT VFR BLD AUTO: 32.4 % (ref 40–54)
HCT VFR BLD CALC: 36 %PCV (ref 36–54)
HGB BLD-MCNC: 10.7 G/DL (ref 14–18)
IMM GRANULOCYTES # BLD AUTO: 0.01 K/UL (ref 0–0.04)
IMM GRANULOCYTES NFR BLD AUTO: 0.3 % (ref 0–0.5)
LACTATE SERPL-SCNC: 1.1 MMOL/L (ref 0.5–2.2)
LIPASE SERPL-CCNC: 10 U/L (ref 4–60)
LYMPHOCYTES # BLD AUTO: 0.4 K/UL (ref 1–4.8)
LYMPHOCYTES NFR BLD: 11.5 % (ref 18–48)
MCH RBC QN AUTO: 29 PG (ref 27–31)
MCHC RBC AUTO-ENTMCNC: 33 G/DL (ref 32–36)
MCV RBC AUTO: 88 FL (ref 82–98)
MONOCYTES # BLD AUTO: 0.5 K/UL (ref 0.3–1)
MONOCYTES NFR BLD: 14.2 % (ref 4–15)
NEUTROPHILS # BLD AUTO: 2.3 K/UL (ref 1.8–7.7)
NEUTROPHILS NFR BLD: 71.2 % (ref 38–73)
NRBC BLD-RTO: 0 /100 WBC
OHS QRS DURATION: 86 MS
OHS QTC CALCULATION: 494 MS
PLATELET # BLD AUTO: 155 K/UL (ref 150–450)
PMV BLD AUTO: 10.2 FL (ref 9.2–12.9)
POC IONIZED CALCIUM: 1.14 MMOL/L (ref 1.06–1.42)
POC TCO2 (MEASURED): 24 MMOL/L (ref 23–29)
POTASSIUM BLD-SCNC: 3.3 MMOL/L (ref 3.5–5.1)
POTASSIUM SERPL-SCNC: 3.4 MMOL/L (ref 3.5–5.1)
POTASSIUM SERPL-SCNC: 3.7 MMOL/L (ref 3.5–5.1)
PROT SERPL-MCNC: 6.9 G/DL (ref 6–8.4)
RBC # BLD AUTO: 3.69 M/UL (ref 4.6–6.2)
SAMPLE: ABNORMAL
SODIUM BLD-SCNC: 127 MMOL/L (ref 136–145)
SODIUM SERPL-SCNC: 125 MMOL/L (ref 136–145)
SODIUM SERPL-SCNC: 128 MMOL/L (ref 136–145)
WBC # BLD AUTO: 3.23 K/UL (ref 3.9–12.7)

## 2024-10-22 PROCEDURE — 83605 ASSAY OF LACTIC ACID: CPT

## 2024-10-22 PROCEDURE — 96374 THER/PROPH/DIAG INJ IV PUSH: CPT

## 2024-10-22 PROCEDURE — 96375 TX/PRO/DX INJ NEW DRUG ADDON: CPT

## 2024-10-22 PROCEDURE — 80100016 HC MAINTENANCE HEMODIALYSIS

## 2024-10-22 PROCEDURE — 63600175 PHARM REV CODE 636 W HCPCS

## 2024-10-22 PROCEDURE — G0257 UNSCHED DIALYSIS ESRD PT HOS: HCPCS

## 2024-10-22 PROCEDURE — 80053 COMPREHEN METABOLIC PANEL: CPT

## 2024-10-22 PROCEDURE — 80048 BASIC METABOLIC PNL TOTAL CA: CPT | Mod: XB | Performed by: PHYSICIAN ASSISTANT

## 2024-10-22 PROCEDURE — 25000003 PHARM REV CODE 250: Performed by: PHYSICIAN ASSISTANT

## 2024-10-22 PROCEDURE — 99285 EMERGENCY DEPT VISIT HI MDM: CPT | Mod: 25

## 2024-10-22 PROCEDURE — 85025 COMPLETE CBC W/AUTO DIFF WBC: CPT

## 2024-10-22 PROCEDURE — G0378 HOSPITAL OBSERVATION PER HR: HCPCS

## 2024-10-22 PROCEDURE — 63600175 PHARM REV CODE 636 W HCPCS: Performed by: PHYSICIAN ASSISTANT

## 2024-10-22 PROCEDURE — 87340 HEPATITIS B SURFACE AG IA: CPT | Performed by: STUDENT IN AN ORGANIZED HEALTH CARE EDUCATION/TRAINING PROGRAM

## 2024-10-22 PROCEDURE — 25000003 PHARM REV CODE 250: Performed by: NURSE PRACTITIONER

## 2024-10-22 PROCEDURE — 93010 ELECTROCARDIOGRAM REPORT: CPT | Mod: ,,, | Performed by: INTERNAL MEDICINE

## 2024-10-22 PROCEDURE — 83690 ASSAY OF LIPASE: CPT

## 2024-10-22 PROCEDURE — 5A1D70Z PERFORMANCE OF URINARY FILTRATION, INTERMITTENT, LESS THAN 6 HOURS PER DAY: ICD-10-PCS | Performed by: STUDENT IN AN ORGANIZED HEALTH CARE EDUCATION/TRAINING PROGRAM

## 2024-10-22 PROCEDURE — 93005 ELECTROCARDIOGRAM TRACING: CPT

## 2024-10-22 RX ORDER — ACETAMINOPHEN 325 MG/1
650 TABLET ORAL EVERY 8 HOURS PRN
Status: DISCONTINUED | OUTPATIENT
Start: 2024-10-22 | End: 2024-10-22

## 2024-10-22 RX ORDER — ONDANSETRON 8 MG/1
8 TABLET, ORALLY DISINTEGRATING ORAL EVERY 8 HOURS PRN
Status: DISCONTINUED | OUTPATIENT
Start: 2024-10-22 | End: 2024-10-22

## 2024-10-22 RX ORDER — ACETAMINOPHEN 500 MG
1000 TABLET ORAL EVERY 8 HOURS
Status: DISCONTINUED | OUTPATIENT
Start: 2024-10-22 | End: 2024-10-23

## 2024-10-22 RX ORDER — SODIUM CHLORIDE 0.9 % (FLUSH) 0.9 %
10 SYRINGE (ML) INJECTION
Status: DISCONTINUED | OUTPATIENT
Start: 2024-10-22 | End: 2024-10-25 | Stop reason: HOSPADM

## 2024-10-22 RX ORDER — GLUCAGON 1 MG
1 KIT INJECTION
Status: DISCONTINUED | OUTPATIENT
Start: 2024-10-22 | End: 2024-10-25 | Stop reason: HOSPADM

## 2024-10-22 RX ORDER — MORPHINE SULFATE 4 MG/ML
4 INJECTION, SOLUTION INTRAMUSCULAR; INTRAVENOUS
Status: COMPLETED | OUTPATIENT
Start: 2024-10-22 | End: 2024-10-22

## 2024-10-22 RX ORDER — TALC
6 POWDER (GRAM) TOPICAL NIGHTLY PRN
Status: DISCONTINUED | OUTPATIENT
Start: 2024-10-22 | End: 2024-10-25 | Stop reason: HOSPADM

## 2024-10-22 RX ORDER — LIDOCAINE AND PRILOCAINE 25; 25 MG/G; MG/G
CREAM TOPICAL
Status: DISCONTINUED | OUTPATIENT
Start: 2024-10-22 | End: 2024-10-25 | Stop reason: HOSPADM

## 2024-10-22 RX ORDER — PROCHLORPERAZINE EDISYLATE 5 MG/ML
5 INJECTION INTRAMUSCULAR; INTRAVENOUS EVERY 6 HOURS PRN
Status: DISCONTINUED | OUTPATIENT
Start: 2024-10-22 | End: 2024-10-23

## 2024-10-22 RX ORDER — NALOXONE HCL 0.4 MG/ML
0.02 VIAL (ML) INJECTION
Status: DISCONTINUED | OUTPATIENT
Start: 2024-10-22 | End: 2024-10-25 | Stop reason: HOSPADM

## 2024-10-22 RX ORDER — SODIUM CHLORIDE 9 MG/ML
INJECTION, SOLUTION INTRAVENOUS ONCE
OUTPATIENT
Start: 2024-10-22 | End: 2024-10-22

## 2024-10-22 RX ORDER — NIFEDIPINE 30 MG/1
90 TABLET, EXTENDED RELEASE ORAL DAILY
Status: DISCONTINUED | OUTPATIENT
Start: 2024-10-23 | End: 2024-10-25 | Stop reason: HOSPADM

## 2024-10-22 RX ORDER — POLYETHYLENE GLYCOL 3350 17 G/17G
17 POWDER, FOR SOLUTION ORAL DAILY
Status: DISCONTINUED | OUTPATIENT
Start: 2024-10-22 | End: 2024-10-25 | Stop reason: HOSPADM

## 2024-10-22 RX ORDER — CARVEDILOL 6.25 MG/1
6.25 TABLET ORAL 2 TIMES DAILY WITH MEALS
Status: DISCONTINUED | OUTPATIENT
Start: 2024-10-22 | End: 2024-10-25 | Stop reason: HOSPADM

## 2024-10-22 RX ORDER — ONDANSETRON HYDROCHLORIDE 2 MG/ML
4 INJECTION, SOLUTION INTRAVENOUS EVERY 6 HOURS
Status: COMPLETED | OUTPATIENT
Start: 2024-10-22 | End: 2024-10-23

## 2024-10-22 RX ORDER — IBUPROFEN 200 MG
16 TABLET ORAL
Status: DISCONTINUED | OUTPATIENT
Start: 2024-10-22 | End: 2024-10-25 | Stop reason: HOSPADM

## 2024-10-22 RX ORDER — IBUPROFEN 200 MG
24 TABLET ORAL
Status: DISCONTINUED | OUTPATIENT
Start: 2024-10-22 | End: 2024-10-25 | Stop reason: HOSPADM

## 2024-10-22 RX ORDER — SENNOSIDES 8.6 MG/1
8.6 TABLET ORAL DAILY
Status: DISCONTINUED | OUTPATIENT
Start: 2024-10-22 | End: 2024-10-24

## 2024-10-22 RX ORDER — HYDRALAZINE HYDROCHLORIDE 25 MG/1
100 TABLET, FILM COATED ORAL 3 TIMES DAILY
Status: DISCONTINUED | OUTPATIENT
Start: 2024-10-22 | End: 2024-10-25 | Stop reason: HOSPADM

## 2024-10-22 RX ADMIN — CARVEDILOL 6.25 MG: 6.25 TABLET, FILM COATED ORAL at 04:10

## 2024-10-22 RX ADMIN — ACETAMINOPHEN 1000 MG: 325 TABLET ORAL at 06:10

## 2024-10-22 RX ADMIN — MORPHINE SULFATE 4 MG: 4 INJECTION INTRAVENOUS at 09:10

## 2024-10-22 RX ADMIN — ONDANSETRON 4 MG: 2 INJECTION INTRAMUSCULAR; INTRAVENOUS at 06:10

## 2024-10-22 RX ADMIN — HYDRALAZINE HYDROCHLORIDE 100 MG: 25 TABLET ORAL at 08:10

## 2024-10-22 RX ADMIN — PROCHLORPERAZINE EDISYLATE 5 MG: 5 INJECTION INTRAMUSCULAR; INTRAVENOUS at 08:10

## 2024-10-22 RX ADMIN — HYDRALAZINE HYDROCHLORIDE 100 MG: 25 TABLET ORAL at 04:10

## 2024-10-23 ENCOUNTER — PATIENT MESSAGE (OUTPATIENT)
Dept: PODIATRY | Facility: CLINIC | Age: 59
End: 2024-10-23

## 2024-10-23 PROBLEM — R59.1 DIFFUSE LYMPHADENOPATHY: Status: RESOLVED | Noted: 2024-02-29 | Resolved: 2024-10-23

## 2024-10-23 PROBLEM — K59.00 CONSTIPATION: Status: ACTIVE | Noted: 2024-10-23

## 2024-10-23 LAB
ALBUMIN SERPL BCP-MCNC: 2.6 G/DL (ref 3.5–5.2)
ALBUMIN SERPL BCP-MCNC: 2.6 G/DL (ref 3.5–5.2)
ALP SERPL-CCNC: 541 U/L (ref 40–150)
ALT SERPL W/O P-5'-P-CCNC: 26 U/L (ref 10–44)
ANION GAP SERPL CALC-SCNC: 8 MMOL/L (ref 8–16)
AST SERPL-CCNC: 44 U/L (ref 10–40)
BACTERIA #/AREA URNS AUTO: ABNORMAL /HPF
BILIRUB DIRECT SERPL-MCNC: 1.5 MG/DL (ref 0.1–0.3)
BILIRUB SERPL-MCNC: 2.1 MG/DL (ref 0.1–1)
BILIRUB UR QL STRIP: NEGATIVE
BUN SERPL-MCNC: 17 MG/DL (ref 6–20)
CALCIUM SERPL-MCNC: 8.7 MG/DL (ref 8.7–10.5)
CHLORIDE SERPL-SCNC: 98 MMOL/L (ref 95–110)
CLARITY UR REFRACT.AUTO: CLEAR
CO2 SERPL-SCNC: 23 MMOL/L (ref 23–29)
COLOR UR AUTO: YELLOW
CREAT SERPL-MCNC: 4.1 MG/DL (ref 0.5–1.4)
EST. GFR  (NO RACE VARIABLE): 15.8 ML/MIN/1.73 M^2
GLUCOSE SERPL-MCNC: 98 MG/DL (ref 70–110)
GLUCOSE UR QL STRIP: NEGATIVE
HGB UR QL STRIP: NEGATIVE
HYALINE CASTS UR QL AUTO: 0 /LPF
KETONES UR QL STRIP: NEGATIVE
LEUKOCYTE ESTERASE UR QL STRIP: ABNORMAL
MICROSCOPIC COMMENT: ABNORMAL
NITRITE UR QL STRIP: NEGATIVE
PH UR STRIP: 7 [PH] (ref 5–8)
PHOSPHATE SERPL-MCNC: 3.7 MG/DL (ref 2.7–4.5)
POCT GLUCOSE: 80 MG/DL (ref 70–110)
POTASSIUM SERPL-SCNC: 4 MMOL/L (ref 3.5–5.1)
PROT SERPL-MCNC: 6.9 G/DL (ref 6–8.4)
PROT UR QL STRIP: ABNORMAL
RBC #/AREA URNS AUTO: 5 /HPF (ref 0–4)
SODIUM SERPL-SCNC: 129 MMOL/L (ref 136–145)
SP GR UR STRIP: 1 (ref 1–1.03)
SQUAMOUS #/AREA URNS AUTO: 3 /HPF
URN SPEC COLLECT METH UR: ABNORMAL
WBC #/AREA URNS AUTO: 13 /HPF (ref 0–5)

## 2024-10-23 PROCEDURE — 87086 URINE CULTURE/COLONY COUNT: CPT

## 2024-10-23 PROCEDURE — 84155 ASSAY OF PROTEIN SERUM: CPT | Performed by: STUDENT IN AN ORGANIZED HEALTH CARE EDUCATION/TRAINING PROGRAM

## 2024-10-23 PROCEDURE — 96376 TX/PRO/DX INJ SAME DRUG ADON: CPT

## 2024-10-23 PROCEDURE — 84450 TRANSFERASE (AST) (SGOT): CPT | Performed by: STUDENT IN AN ORGANIZED HEALTH CARE EDUCATION/TRAINING PROGRAM

## 2024-10-23 PROCEDURE — 99232 SBSQ HOSP IP/OBS MODERATE 35: CPT | Mod: ,,, | Performed by: NURSE PRACTITIONER

## 2024-10-23 PROCEDURE — 80069 RENAL FUNCTION PANEL: CPT | Performed by: STUDENT IN AN ORGANIZED HEALTH CARE EDUCATION/TRAINING PROGRAM

## 2024-10-23 PROCEDURE — 25000003 PHARM REV CODE 250: Performed by: NURSE PRACTITIONER

## 2024-10-23 PROCEDURE — 87088 URINE BACTERIA CULTURE: CPT

## 2024-10-23 PROCEDURE — 63600175 PHARM REV CODE 636 W HCPCS

## 2024-10-23 PROCEDURE — 63600175 PHARM REV CODE 636 W HCPCS: Performed by: PHYSICIAN ASSISTANT

## 2024-10-23 PROCEDURE — 82962 GLUCOSE BLOOD TEST: CPT

## 2024-10-23 PROCEDURE — 25000003 PHARM REV CODE 250: Performed by: PHYSICIAN ASSISTANT

## 2024-10-23 PROCEDURE — 81001 URINALYSIS AUTO W/SCOPE: CPT

## 2024-10-23 PROCEDURE — 11000001 HC ACUTE MED/SURG PRIVATE ROOM

## 2024-10-23 RX ORDER — HYDROMORPHONE HYDROCHLORIDE 1 MG/ML
0.5 INJECTION, SOLUTION INTRAMUSCULAR; INTRAVENOUS; SUBCUTANEOUS EVERY 6 HOURS PRN
Status: DISCONTINUED | OUTPATIENT
Start: 2024-10-23 | End: 2024-10-24

## 2024-10-23 RX ORDER — ALUMINUM HYDROXIDE, MAGNESIUM HYDROXIDE, AND SIMETHICONE 1200; 120; 1200 MG/30ML; MG/30ML; MG/30ML
30 SUSPENSION ORAL EVERY 6 HOURS PRN
Status: DISCONTINUED | OUTPATIENT
Start: 2024-10-23 | End: 2024-10-25 | Stop reason: HOSPADM

## 2024-10-23 RX ORDER — SODIUM CHLORIDE 9 MG/ML
INJECTION, SOLUTION INTRAVENOUS ONCE
OUTPATIENT
Start: 2024-10-23 | End: 2024-10-23

## 2024-10-23 RX ORDER — SODIUM CHLORIDE 9 MG/ML
INJECTION, SOLUTION INTRAVENOUS ONCE
OUTPATIENT
Start: 2024-10-24

## 2024-10-23 RX ORDER — SODIUM CHLORIDE 1 G/1
1000 TABLET ORAL
Status: DISCONTINUED | OUTPATIENT
Start: 2024-10-23 | End: 2024-10-23

## 2024-10-23 RX ORDER — BACLOFEN 5 MG/1
5 TABLET ORAL DAILY PRN
Status: DISCONTINUED | OUTPATIENT
Start: 2024-10-23 | End: 2024-10-25 | Stop reason: HOSPADM

## 2024-10-23 RX ORDER — HEPARIN SODIUM 5000 [USP'U]/ML
5000 INJECTION, SOLUTION INTRAVENOUS; SUBCUTANEOUS EVERY 8 HOURS
Status: DISCONTINUED | OUTPATIENT
Start: 2024-10-23 | End: 2024-10-23

## 2024-10-23 RX ORDER — FUROSEMIDE 20 MG/1
20 TABLET ORAL
Status: DISCONTINUED | OUTPATIENT
Start: 2024-10-23 | End: 2024-10-23

## 2024-10-23 RX ORDER — DICYCLOMINE HYDROCHLORIDE 10 MG/1
10 CAPSULE ORAL 4 TIMES DAILY
Status: DISCONTINUED | OUTPATIENT
Start: 2024-10-24 | End: 2024-10-24

## 2024-10-23 RX ORDER — CHLORTHALIDONE 25 MG/1
25 TABLET ORAL DAILY PRN
Status: DISCONTINUED | OUTPATIENT
Start: 2024-10-23 | End: 2024-10-24

## 2024-10-23 RX ADMIN — ONDANSETRON 4 MG: 2 INJECTION INTRAMUSCULAR; INTRAVENOUS at 06:10

## 2024-10-23 RX ADMIN — CARVEDILOL 6.25 MG: 6.25 TABLET, FILM COATED ORAL at 05:10

## 2024-10-23 RX ADMIN — HYDRALAZINE HYDROCHLORIDE 100 MG: 25 TABLET ORAL at 03:10

## 2024-10-23 RX ADMIN — HYDRALAZINE HYDROCHLORIDE 100 MG: 25 TABLET ORAL at 08:10

## 2024-10-23 RX ADMIN — SENNOSIDES 8.6 MG: 8.6 TABLET, FILM COATED ORAL at 08:10

## 2024-10-23 RX ADMIN — HYDROMORPHONE HYDROCHLORIDE 0.5 MG: 1 INJECTION, SOLUTION INTRAMUSCULAR; INTRAVENOUS; SUBCUTANEOUS at 11:10

## 2024-10-23 RX ADMIN — POLYETHYLENE GLYCOL 3350 17 G: 17 POWDER, FOR SOLUTION ORAL at 08:10

## 2024-10-23 RX ADMIN — ACETAMINOPHEN 1000 MG: 325 TABLET ORAL at 06:10

## 2024-10-23 RX ADMIN — ALUMINUM HYDROXIDE, MAGNESIUM HYDROXIDE, AND SIMETHICONE 30 ML: 200; 200; 20 SUSPENSION ORAL at 08:10

## 2024-10-23 RX ADMIN — PROCHLORPERAZINE EDISYLATE 5 MG: 5 INJECTION INTRAMUSCULAR; INTRAVENOUS at 05:10

## 2024-10-23 RX ADMIN — CARVEDILOL 6.25 MG: 6.25 TABLET, FILM COATED ORAL at 07:10

## 2024-10-23 RX ADMIN — ONDANSETRON 4 MG: 2 INJECTION INTRAMUSCULAR; INTRAVENOUS at 12:10

## 2024-10-23 RX ADMIN — ONDANSETRON 4 MG: 2 INJECTION INTRAMUSCULAR; INTRAVENOUS at 11:10

## 2024-10-23 RX ADMIN — NIFEDIPINE 90 MG: 60 TABLET, FILM COATED, EXTENDED RELEASE ORAL at 08:10

## 2024-10-23 RX ADMIN — ACETAMINOPHEN 1000 MG: 325 TABLET ORAL at 01:10

## 2024-10-24 LAB
ALBUMIN SERPL BCP-MCNC: 2.6 G/DL (ref 3.5–5.2)
ALBUMIN SERPL BCP-MCNC: 2.6 G/DL (ref 3.5–5.2)
ALP SERPL-CCNC: 518 U/L (ref 40–150)
ALT SERPL W/O P-5'-P-CCNC: 27 U/L (ref 10–44)
ANION GAP SERPL CALC-SCNC: 10 MMOL/L (ref 8–16)
AST SERPL-CCNC: 44 U/L (ref 10–40)
BACTERIA UR CULT: ABNORMAL
BASOPHILS # BLD AUTO: 0.03 K/UL (ref 0–0.2)
BASOPHILS NFR BLD: 1 % (ref 0–1.9)
BILIRUB DIRECT SERPL-MCNC: 1.3 MG/DL (ref 0.1–0.3)
BILIRUB SERPL-MCNC: 1.8 MG/DL (ref 0.1–1)
BUN SERPL-MCNC: 24 MG/DL (ref 6–20)
CALCIUM SERPL-MCNC: 8.7 MG/DL (ref 8.7–10.5)
CHLORIDE SERPL-SCNC: 96 MMOL/L (ref 95–110)
CO2 SERPL-SCNC: 21 MMOL/L (ref 23–29)
CREAT SERPL-MCNC: 5.5 MG/DL (ref 0.5–1.4)
DIFFERENTIAL METHOD BLD: ABNORMAL
EOSINOPHIL # BLD AUTO: 0.1 K/UL (ref 0–0.5)
EOSINOPHIL NFR BLD: 3 % (ref 0–8)
ERYTHROCYTE [DISTWIDTH] IN BLOOD BY AUTOMATED COUNT: 14 % (ref 11.5–14.5)
EST. GFR  (NO RACE VARIABLE): 11.1 ML/MIN/1.73 M^2
GLUCOSE SERPL-MCNC: 84 MG/DL (ref 70–110)
HCT VFR BLD AUTO: 30.8 % (ref 40–54)
HGB BLD-MCNC: 10.3 G/DL (ref 14–18)
IMM GRANULOCYTES # BLD AUTO: 0.01 K/UL (ref 0–0.04)
IMM GRANULOCYTES NFR BLD AUTO: 0.3 % (ref 0–0.5)
LYMPHOCYTES # BLD AUTO: 0.4 K/UL (ref 1–4.8)
LYMPHOCYTES NFR BLD: 13.9 % (ref 18–48)
MCH RBC QN AUTO: 29.3 PG (ref 27–31)
MCHC RBC AUTO-ENTMCNC: 33.4 G/DL (ref 32–36)
MCV RBC AUTO: 88 FL (ref 82–98)
MONOCYTES # BLD AUTO: 0.4 K/UL (ref 0.3–1)
MONOCYTES NFR BLD: 13.2 % (ref 4–15)
NEUTROPHILS # BLD AUTO: 2.1 K/UL (ref 1.8–7.7)
NEUTROPHILS NFR BLD: 68.6 % (ref 38–73)
NRBC BLD-RTO: 0 /100 WBC
PHOSPHATE SERPL-MCNC: 4.1 MG/DL (ref 2.7–4.5)
PLATELET # BLD AUTO: 149 K/UL (ref 150–450)
PMV BLD AUTO: 10.6 FL (ref 9.2–12.9)
POTASSIUM SERPL-SCNC: 4.2 MMOL/L (ref 3.5–5.1)
PROT SERPL-MCNC: 6.7 G/DL (ref 6–8.4)
RBC # BLD AUTO: 3.52 M/UL (ref 4.6–6.2)
SODIUM SERPL-SCNC: 127 MMOL/L (ref 136–145)
WBC # BLD AUTO: 3.03 K/UL (ref 3.9–12.7)

## 2024-10-24 PROCEDURE — 25000003 PHARM REV CODE 250: Performed by: INTERNAL MEDICINE

## 2024-10-24 PROCEDURE — 80069 RENAL FUNCTION PANEL: CPT | Performed by: STUDENT IN AN ORGANIZED HEALTH CARE EDUCATION/TRAINING PROGRAM

## 2024-10-24 PROCEDURE — 25000003 PHARM REV CODE 250: Performed by: STUDENT IN AN ORGANIZED HEALTH CARE EDUCATION/TRAINING PROGRAM

## 2024-10-24 PROCEDURE — 90935 HEMODIALYSIS ONE EVALUATION: CPT | Mod: ,,, | Performed by: NURSE PRACTITIONER

## 2024-10-24 PROCEDURE — 80100016 HC MAINTENANCE HEMODIALYSIS

## 2024-10-24 PROCEDURE — 84450 TRANSFERASE (AST) (SGOT): CPT | Performed by: STUDENT IN AN ORGANIZED HEALTH CARE EDUCATION/TRAINING PROGRAM

## 2024-10-24 PROCEDURE — 63600175 PHARM REV CODE 636 W HCPCS

## 2024-10-24 PROCEDURE — 25000003 PHARM REV CODE 250

## 2024-10-24 PROCEDURE — 36415 COLL VENOUS BLD VENIPUNCTURE: CPT | Performed by: STUDENT IN AN ORGANIZED HEALTH CARE EDUCATION/TRAINING PROGRAM

## 2024-10-24 PROCEDURE — 11000001 HC ACUTE MED/SURG PRIVATE ROOM

## 2024-10-24 PROCEDURE — 84155 ASSAY OF PROTEIN SERUM: CPT | Performed by: STUDENT IN AN ORGANIZED HEALTH CARE EDUCATION/TRAINING PROGRAM

## 2024-10-24 PROCEDURE — 85025 COMPLETE CBC W/AUTO DIFF WBC: CPT | Performed by: STUDENT IN AN ORGANIZED HEALTH CARE EDUCATION/TRAINING PROGRAM

## 2024-10-24 PROCEDURE — 63600175 PHARM REV CODE 636 W HCPCS: Performed by: INTERNAL MEDICINE

## 2024-10-24 PROCEDURE — 25000003 PHARM REV CODE 250: Performed by: PHYSICIAN ASSISTANT

## 2024-10-24 PROCEDURE — 25000003 PHARM REV CODE 250: Performed by: NURSE PRACTITIONER

## 2024-10-24 PROCEDURE — 84075 ASSAY ALKALINE PHOSPHATASE: CPT | Performed by: STUDENT IN AN ORGANIZED HEALTH CARE EDUCATION/TRAINING PROGRAM

## 2024-10-24 RX ORDER — GABAPENTIN 100 MG/1
100 CAPSULE ORAL 3 TIMES DAILY
Status: DISCONTINUED | OUTPATIENT
Start: 2024-10-24 | End: 2024-10-25 | Stop reason: HOSPADM

## 2024-10-24 RX ORDER — ACETAMINOPHEN 500 MG
1000 TABLET ORAL 3 TIMES DAILY
Status: DISCONTINUED | OUTPATIENT
Start: 2024-10-24 | End: 2024-10-25

## 2024-10-24 RX ORDER — CHLORPROMAZINE HYDROCHLORIDE 50 MG/1
50 TABLET, FILM COATED ORAL 3 TIMES DAILY
Status: DISCONTINUED | OUTPATIENT
Start: 2024-10-24 | End: 2024-10-25

## 2024-10-24 RX ORDER — MUPIROCIN 20 MG/G
OINTMENT TOPICAL 2 TIMES DAILY
Status: DISCONTINUED | OUTPATIENT
Start: 2024-10-24 | End: 2024-10-25 | Stop reason: HOSPADM

## 2024-10-24 RX ORDER — CHLORPROMAZINE HYDROCHLORIDE 10 MG/1
10 TABLET, FILM COATED ORAL 3 TIMES DAILY
Status: DISCONTINUED | OUTPATIENT
Start: 2024-10-24 | End: 2024-10-24

## 2024-10-24 RX ORDER — METOCLOPRAMIDE 5 MG/1
5 TABLET ORAL
Status: CANCELLED | OUTPATIENT
Start: 2024-10-24

## 2024-10-24 RX ORDER — HYDROMORPHONE HYDROCHLORIDE 2 MG/1
2 TABLET ORAL EVERY 4 HOURS PRN
Status: DISCONTINUED | OUTPATIENT
Start: 2024-10-24 | End: 2024-10-24

## 2024-10-24 RX ORDER — CARISOPRODOL 350 MG/1
350 TABLET ORAL 3 TIMES DAILY
Status: DISCONTINUED | OUTPATIENT
Start: 2024-10-24 | End: 2024-10-25

## 2024-10-24 RX ORDER — METOCLOPRAMIDE 5 MG/1
10 TABLET ORAL
Status: DISCONTINUED | OUTPATIENT
Start: 2024-10-24 | End: 2024-10-24

## 2024-10-24 RX ORDER — HYDROMORPHONE HYDROCHLORIDE 2 MG/1
2 TABLET ORAL EVERY 4 HOURS PRN
Status: DISCONTINUED | OUTPATIENT
Start: 2024-10-24 | End: 2024-10-25 | Stop reason: HOSPADM

## 2024-10-24 RX ORDER — AMOXICILLIN 250 MG
2 CAPSULE ORAL 2 TIMES DAILY
Status: DISCONTINUED | OUTPATIENT
Start: 2024-10-24 | End: 2024-10-25 | Stop reason: HOSPADM

## 2024-10-24 RX ORDER — HYDROMORPHONE HYDROCHLORIDE 1 MG/ML
0.5 INJECTION, SOLUTION INTRAMUSCULAR; INTRAVENOUS; SUBCUTANEOUS EVERY 6 HOURS PRN
Status: DISCONTINUED | OUTPATIENT
Start: 2024-10-24 | End: 2024-10-25 | Stop reason: HOSPADM

## 2024-10-24 RX ADMIN — HYDRALAZINE HYDROCHLORIDE 100 MG: 25 TABLET ORAL at 08:10

## 2024-10-24 RX ADMIN — ACETAMINOPHEN 1000 MG: 500 TABLET ORAL at 10:10

## 2024-10-24 RX ADMIN — ACETAMINOPHEN 1000 MG: 500 TABLET ORAL at 08:10

## 2024-10-24 RX ADMIN — CARVEDILOL 6.25 MG: 6.25 TABLET, FILM COATED ORAL at 08:10

## 2024-10-24 RX ADMIN — GABAPENTIN 100 MG: 100 CAPSULE ORAL at 08:10

## 2024-10-24 RX ADMIN — PROMETHAZINE HYDROCHLORIDE 12.5 MG: 25 INJECTION INTRAMUSCULAR; INTRAVENOUS at 12:10

## 2024-10-24 RX ADMIN — MUPIROCIN: 20 OINTMENT TOPICAL at 11:10

## 2024-10-24 RX ADMIN — NIFEDIPINE 90 MG: 60 TABLET, FILM COATED, EXTENDED RELEASE ORAL at 08:10

## 2024-10-24 RX ADMIN — CHLORPROMAZINE HYDROCHLORIDE 50 MG: 50 TABLET, FILM COATED ORAL at 08:10

## 2024-10-24 RX ADMIN — METOCLOPRAMIDE 10 MG: 5 TABLET ORAL at 11:10

## 2024-10-24 RX ADMIN — CARISOPRODOL 350 MG: 350 TABLET ORAL at 11:10

## 2024-10-24 RX ADMIN — DICYCLOMINE HYDROCHLORIDE 10 MG: 10 CAPSULE ORAL at 08:10

## 2024-10-24 RX ADMIN — PROMETHAZINE HYDROCHLORIDE 12.5 MG: 25 INJECTION INTRAMUSCULAR; INTRAVENOUS at 11:10

## 2024-10-24 RX ADMIN — MUPIROCIN: 20 OINTMENT TOPICAL at 08:10

## 2024-10-24 RX ADMIN — CHLORTHALIDONE 25 MG: 25 TABLET ORAL at 04:10

## 2024-10-24 RX ADMIN — CARVEDILOL 6.25 MG: 6.25 TABLET, FILM COATED ORAL at 06:10

## 2024-10-24 RX ADMIN — HYDROMORPHONE HYDROCHLORIDE 0.5 MG: 1 INJECTION, SOLUTION INTRAMUSCULAR; INTRAVENOUS; SUBCUTANEOUS at 05:10

## 2024-10-24 RX ADMIN — HYDROMORPHONE HYDROCHLORIDE 2 MG: 2 TABLET ORAL at 10:10

## 2024-10-24 RX ADMIN — CARISOPRODOL 350 MG: 350 TABLET ORAL at 08:10

## 2024-10-25 VITALS
RESPIRATION RATE: 17 BRPM | TEMPERATURE: 98 F | OXYGEN SATURATION: 99 % | HEIGHT: 73 IN | WEIGHT: 189 LBS | SYSTOLIC BLOOD PRESSURE: 185 MMHG | DIASTOLIC BLOOD PRESSURE: 82 MMHG | BODY MASS INDEX: 25.05 KG/M2 | HEART RATE: 67 BPM

## 2024-10-25 PROCEDURE — 99232 SBSQ HOSP IP/OBS MODERATE 35: CPT | Mod: ,,, | Performed by: NURSE PRACTITIONER

## 2024-10-25 PROCEDURE — 25000003 PHARM REV CODE 250: Performed by: INTERNAL MEDICINE

## 2024-10-25 PROCEDURE — 25000003 PHARM REV CODE 250: Performed by: NURSE PRACTITIONER

## 2024-10-25 RX ORDER — CHLORPROMAZINE HYDROCHLORIDE 25 MG/1
25 TABLET, FILM COATED ORAL 3 TIMES DAILY
Status: DISCONTINUED | OUTPATIENT
Start: 2024-10-25 | End: 2024-10-25 | Stop reason: HOSPADM

## 2024-10-25 RX ORDER — AMOXICILLIN 250 MG
2 CAPSULE ORAL DAILY
Qty: 100 TABLET | Status: SHIPPED | OUTPATIENT
Start: 2024-10-25

## 2024-10-25 RX ORDER — GABAPENTIN 100 MG/1
100 CAPSULE ORAL 3 TIMES DAILY
Qty: 90 CAPSULE | Refills: 11 | Status: SHIPPED | OUTPATIENT
Start: 2024-10-25 | End: 2025-10-25

## 2024-10-25 RX ORDER — CHLORPROMAZINE HYDROCHLORIDE 25 MG/1
25-50 TABLET, FILM COATED ORAL 3 TIMES DAILY
Qty: 180 TABLET | Refills: 11 | Status: SHIPPED | OUTPATIENT
Start: 2024-10-25 | End: 2025-10-25

## 2024-10-25 RX ORDER — ACETAMINOPHEN 325 MG/1
650 TABLET ORAL EVERY 6 HOURS PRN
Status: DISCONTINUED | OUTPATIENT
Start: 2024-10-25 | End: 2024-10-25 | Stop reason: HOSPADM

## 2024-10-25 RX ADMIN — NIFEDIPINE 90 MG: 60 TABLET, FILM COATED, EXTENDED RELEASE ORAL at 10:10

## 2024-10-25 RX ADMIN — GABAPENTIN 100 MG: 100 CAPSULE ORAL at 10:10

## 2024-10-25 RX ADMIN — CARISOPRODOL 350 MG: 350 TABLET ORAL at 10:10

## 2024-10-25 RX ADMIN — HYDRALAZINE HYDROCHLORIDE 100 MG: 25 TABLET ORAL at 10:10

## 2024-10-25 RX ADMIN — CARVEDILOL 6.25 MG: 6.25 TABLET, FILM COATED ORAL at 10:10

## 2024-10-25 RX ADMIN — MUPIROCIN: 20 OINTMENT TOPICAL at 10:10

## 2024-10-28 ENCOUNTER — HOSPITAL ENCOUNTER (OUTPATIENT)
Dept: PREADMISSION TESTING | Facility: HOSPITAL | Age: 59
Discharge: HOME | End: 2024-10-28
Attending: SURGERY
Payer: COMMERCIAL

## 2024-10-28 ENCOUNTER — HOSPITAL ENCOUNTER (OUTPATIENT)
Dept: PREADMISSION TESTING | Facility: HOSPITAL | Age: 59
Discharge: HOME | End: 2024-10-28
Attending: STUDENT IN AN ORGANIZED HEALTH CARE EDUCATION/TRAINING PROGRAM
Payer: COMMERCIAL

## 2024-10-28 ENCOUNTER — PATIENT OUTREACH (OUTPATIENT)
Dept: ADMINISTRATIVE | Facility: CLINIC | Age: 59
End: 2024-10-28
Payer: COMMERCIAL

## 2024-10-28 VITALS
BODY MASS INDEX: 25.27 KG/M2 | DIASTOLIC BLOOD PRESSURE: 81 MMHG | RESPIRATION RATE: 18 BRPM | HEIGHT: 73 IN | WEIGHT: 190.69 LBS | SYSTOLIC BLOOD PRESSURE: 160 MMHG | RESPIRATION RATE: 18 BRPM | DIASTOLIC BLOOD PRESSURE: 80 MMHG | OXYGEN SATURATION: 98 % | BODY MASS INDEX: 25.27 KG/M2 | HEIGHT: 73 IN | WEIGHT: 190.69 LBS | TEMPERATURE: 96 F | OXYGEN SATURATION: 98 % | TEMPERATURE: 96 F | HEART RATE: 69 BPM | SYSTOLIC BLOOD PRESSURE: 161 MMHG | HEART RATE: 69 BPM

## 2024-10-28 DIAGNOSIS — Z01.818 PREOP TESTING: Primary | ICD-10-CM

## 2024-10-28 LAB
ANION GAP SERPL CALC-SCNC: 11 MMOL/L (ref 8–16)
APTT PPP: 31.3 SEC (ref 21–32)
BUN SERPL-MCNC: 29 MG/DL (ref 6–20)
CALCIUM SERPL-MCNC: 9.4 MG/DL (ref 8.7–10.5)
CHLORIDE SERPL-SCNC: 97 MMOL/L (ref 95–110)
CO2 SERPL-SCNC: 27 MMOL/L (ref 23–29)
CREAT SERPL-MCNC: 5.6 MG/DL (ref 0.5–1.4)
EST. GFR  (NO RACE VARIABLE): 11 ML/MIN/1.73 M^2
GLUCOSE SERPL-MCNC: 81 MG/DL (ref 70–110)
INR PPP: 1.1 (ref 0.8–1.2)
POTASSIUM SERPL-SCNC: 3.7 MMOL/L (ref 3.5–5.1)
PROTHROMBIN TIME: 12.1 SEC (ref 9–12.5)
SODIUM SERPL-SCNC: 135 MMOL/L (ref 136–145)

## 2024-10-28 PROCEDURE — 85610 PROTHROMBIN TIME: CPT | Performed by: SURGERY

## 2024-10-28 PROCEDURE — 85730 THROMBOPLASTIN TIME PARTIAL: CPT | Performed by: SURGERY

## 2024-10-28 PROCEDURE — 80048 BASIC METABOLIC PNL TOTAL CA: CPT | Performed by: SURGERY

## 2024-10-28 NOTE — ANESTHESIA PREPROCEDURE EVALUATION
10/28/2024  Catalino Mcfadden is a 59 y.o., male scheduled for  BIOPSY, MUSCLE (Left) - Left quadriceps muscle biopsy on 11/4/2024.      Past Medical History:   Diagnosis Date    Cancer     Diabetes mellitus, type 2     Diabetic foot ulcer associated with diabetes mellitus due to underlying condition 07/16/2020    ESRD on hemodialysis     Hyperlipidemia     Hypertension     Obese          Past Surgical History:   Procedure Laterality Date    AV FISTULA PLACEMENT Left 07/06/2023    Procedure: CREATION, AV FISTULA;  Surgeon: Daniel Poon MD;  Location: [facility] OR;  Service: Vascular;  Laterality: Left;  RN PREOP 6/28/2023  LABS DAY OF SURGERY    BONE BIOPSY Left 07/17/2020    Procedure: BIOPSY, BONE;  Surgeon: Verona Whitmore DPM;  Location: [facility] OR;  Service: Podiatry;  Laterality: Left;    CERVICAL DISC SURGERY  07/11/2016    COLONOSCOPY N/A 3/25/2024    Procedure: COLONOSCOPY;  Surgeon: Roopa Pérez MD;  Location: [facility] (4TH FLR);  Service: Endoscopy;  Laterality: N/A;  Ref By: Dr. Pérez   constipation prep  Diaylsis Patient Lab has been ordered  3/20-precall complete-MS    COLONOSCOPY N/A 4/8/2024    Procedure: COLONOSCOPY;  Surgeon: Roopa Pérez MD;  Location: [facility] (2ND FLR);  Service: Endoscopy;  Laterality: N/A;  Prep instructions sent via portal/given to pt in clinic  pt had to be r/s at  request  Dialysis Tues,Thurs,Sat-dw  Peg Prep-dw  4/2/24- Labs - dialysis /poor prep on 3/26 - PEG x 2 - extended prep /LVM for precall - ERW  4/5-LVM for precall-KPvt    COLONOSCOPY N/A 4/16/2024    Procedure: COLONOSCOPY;  Surgeon: Jarett Hill MD;  Location: [facility] (2ND FLR);  Service: Endoscopy;  Laterality: N/A;    DEBRIDEMENT Left 07/17/2020    Procedure: DEBRIDEMENT;  Surgeon: Verona Whitmore DPM;  Location: [facility] OR;  Service: Podiatry;  Laterality: Left;    DEBRIDEMENT OF  FOOT Right     toes & plantar surface    ENDOBRONCHIAL ULTRASOUND N/A 3/1/2024    Procedure: ENDOBRONCHIAL ULTRASOUND (EBUS);  Surgeon: Francisco Fleming MD;  Location: [facility] 2ND FLR;  Service: Pulmonary;  Laterality: N/A;    ENDOSCOPIC MUCOSAL RESECTION OF COLON N/A 9/9/2024    Procedure: RESECTION, MUCOSA, COLON, ENDOSCOPIC;  Surgeon: Vikas Quinonez MD;  Location: [facility] (2ND FLR);  Service: Endoscopy;  Laterality: N/A;  6/11 portal-peg-dialysis T Th Sat-+K scheduled-.colonoscopy in 3 months with [facility] doc, [facility], removal of TI polyp and assess prior EMR sites. Cristiano-tt  7/19/24: rescheduled instructions sent via portal-GD  9/3-lvm for pre call-tb  9/4-pre call complete-    ESOPHAGEAL MANOMETRY WITH MEASUREMENT OF IMPEDANCE N/A 03/27/2023    Procedure: MANOMETRY, ESOPHAGUS, WITH IMPEDANCE MEASUREMENT;  Surgeon: Roopa Pérez MD;  Location: [facility] (4TH FLR);  Service: Endoscopy;  Laterality: N/A;  Belching and rumination protocol please  instructions via portal - sm    ESOPHAGOGASTRODUODENOSCOPY N/A 12/16/2022    Procedure: EGD (ESOPHAGOGASTRODUODENOSCOPY);  Surgeon: Eren Francois MD;  Location: [facility];  Service: Endoscopy;  Laterality: N/A;  Pt. on Dialysis. K+ ordered prior to procedure.EC    ESOPHAGOGASTRODUODENOSCOPY N/A 12/5/2023    Procedure: EGD (ESOPHAGOGASTRODUODENOSCOPY);  Surgeon: Kash Johnston MD;  Location: [facility];  Service: Endoscopy;  Laterality: N/A;    FRACTURE SURGERY Right     medial ankle, metal plate present    INJECTION OF ANESTHETIC AGENT AROUND NERVE Right 2/2/2024    Procedure: BLOCK, NERVE STELLATE GANGLION *HOLD ASPIRIN 5 DAYS PRIOR*;  Surgeon: Gokul Gonzalez MD;  Location: [facility] PAIN MGT;  Service: Pain Management;  Laterality: Right;  [phone]    INSERTION OF TUNNELED CENTRAL VENOUS CATHETER (CVC) WITH SUBCUTANEOUS PORT N/A 06/11/2021    Procedure: TUNNEL CATH INSERTION WITHOUT PORT;  Surgeon: Dosc Diagnostic Provider;  Location: [facility];  Service:  Radiology;  Laterality: N/A;  11AM START---PHONE PREOP 6/10/21---COVID POSTIVE ON 7/2020---NO S/S    left leg orthopedic surgery Left     REVISION OF ARTERIOVENOUS FISTULA Left 3/14/2024    Procedure: REVISION, AV FISTULA;  Surgeon: Daniel Poon MD;  Location: [facility];  Service: Vascular;  Laterality: Left;  RN preop 3/6/2024----NEED ORDERS    UPPER GASTROINTESTINAL ENDOSCOPY             Pre-op Assessment    I have reviewed the Patient Summary Reports.     I have reviewed the Nursing Notes. I have reviewed the NPO Status.   I have reviewed the Medications.     Review of Systems  Anesthesia Hx:  No problems with previous Anesthesia             Denies Family Hx of Anesthesia complications.    Denies Personal Hx of Anesthesia complications.                    Social:  Non-Smoker, No Alcohol Use       Hematology/Oncology:  Hematology Normal   Oncology Normal                                   EENT/Dental:  EENT/Dental Normal           Cardiovascular:  Exercise tolerance: good   Hypertension                                          Pulmonary:  Pulmonary Normal                       Renal/:  Chronic Renal Disease, ESRD   TTHS             Hepatic/GI:      Liver Disease,  Hepatic cirrhosis             Musculoskeletal:  Arthritis               Neurological:    Neuromuscular Disease,                                   Dermatological:  Skin Normal    Psych:  Psychiatric Normal                       Anesthesia Plan  Type of Anesthesia, risks & benefits discussed:    Anesthesia Type: MAC, Gen Natural Airway  Intra-op Monitoring Plan: Standard ASA Monitors  Induction:  IV  Informed Consent: Informed consent signed with the Patient and all parties understand the risks and agree with anesthesia plan.  All questions answered.   ASA Score: 3  Day of Surgery Review of History & Physical: H&P Update referred to the surgeon/provider.    Ready For Surgery From Anesthesia Perspective.     .

## 2024-10-29 ENCOUNTER — TELEPHONE (OUTPATIENT)
Dept: HEMATOLOGY/ONCOLOGY | Facility: CLINIC | Age: 59
End: 2024-10-29
Payer: COMMERCIAL

## 2024-10-29 ENCOUNTER — PATIENT MESSAGE (OUTPATIENT)
Dept: ADMINISTRATIVE | Facility: CLINIC | Age: 59
End: 2024-10-29
Payer: COMMERCIAL

## 2024-10-31 ENCOUNTER — HOSPITAL ENCOUNTER (OUTPATIENT)
Dept: INTERVENTIONAL RADIOLOGY/VASCULAR | Facility: HOSPITAL | Age: 59
Discharge: HOME | End: 2024-10-31
Attending: SURGERY
Payer: COMMERCIAL

## 2024-10-31 VITALS
HEART RATE: 70 BPM | DIASTOLIC BLOOD PRESSURE: 77 MMHG | OXYGEN SATURATION: 98 % | SYSTOLIC BLOOD PRESSURE: 164 MMHG | TEMPERATURE: 97 F | RESPIRATION RATE: 18 BRPM

## 2024-10-31 DIAGNOSIS — T82.858A ARTERIOVENOUS FISTULA STENOSIS, INITIAL ENCOUNTER: Primary | ICD-10-CM

## 2024-10-31 DIAGNOSIS — Z86.31 HISTORY OF DIABETIC ULCER OF FOOT: ICD-10-CM

## 2024-10-31 LAB — POTASSIUM SERPL-SCNC: 3.7 MMOL/L (ref 3.5–5.1)

## 2024-10-31 PROCEDURE — 63600175 PHARM REV CODE 636 W HCPCS: Performed by: SURGERY

## 2024-10-31 PROCEDURE — 25500020 PHARM REV CODE 255: Performed by: SURGERY

## 2024-10-31 PROCEDURE — 36415 COLL VENOUS BLD VENIPUNCTURE: CPT | Performed by: SURGERY

## 2024-10-31 PROCEDURE — 84132 ASSAY OF SERUM POTASSIUM: CPT | Performed by: SURGERY

## 2024-10-31 RX ORDER — CEFAZOLIN 2 G/1
2 INJECTION, POWDER, FOR SOLUTION INTRAMUSCULAR; INTRAVENOUS ONCE
Status: DISCONTINUED | OUTPATIENT
Start: 2024-10-31 | End: 2024-11-01 | Stop reason: HOSPADM

## 2024-10-31 RX ORDER — MIDAZOLAM HYDROCHLORIDE 2 MG/2ML
INJECTION, SOLUTION INTRAMUSCULAR; INTRAVENOUS
Status: COMPLETED | OUTPATIENT
Start: 2024-10-31 | End: 2024-10-31

## 2024-10-31 RX ORDER — HEPARIN SODIUM 1000 [USP'U]/ML
INJECTION, SOLUTION INTRAVENOUS; SUBCUTANEOUS
Status: COMPLETED | OUTPATIENT
Start: 2024-10-31 | End: 2024-10-31

## 2024-10-31 RX ORDER — LIDOCAINE HYDROCHLORIDE 10 MG/ML
INJECTION, SOLUTION INFILTRATION; PERINEURAL
Status: COMPLETED | OUTPATIENT
Start: 2024-10-31 | End: 2024-10-31

## 2024-10-31 RX ORDER — FENTANYL CITRATE 50 UG/ML
INJECTION, SOLUTION INTRAMUSCULAR; INTRAVENOUS
Status: COMPLETED | OUTPATIENT
Start: 2024-10-31 | End: 2024-10-31

## 2024-10-31 RX ORDER — CEFAZOLIN SODIUM 1 G/3ML
INJECTION, POWDER, FOR SOLUTION INTRAMUSCULAR; INTRAVENOUS
Status: COMPLETED | OUTPATIENT
Start: 2024-10-31 | End: 2024-10-31

## 2024-10-31 RX ADMIN — HEPARIN SODIUM 3000 UNITS: 1000 INJECTION, SOLUTION INTRAVENOUS; SUBCUTANEOUS at 02:10

## 2024-10-31 RX ADMIN — MIDAZOLAM HYDROCHLORIDE 1 MG: 1 INJECTION, SOLUTION INTRAMUSCULAR; INTRAVENOUS at 02:10

## 2024-10-31 RX ADMIN — LIDOCAINE HYDROCHLORIDE 1 ML: 10 INJECTION, SOLUTION INFILTRATION; PERINEURAL at 02:10

## 2024-10-31 RX ADMIN — FENTANYL CITRATE 50 MCG: 50 INJECTION, SOLUTION INTRAMUSCULAR; INTRAVENOUS at 02:10

## 2024-10-31 RX ADMIN — CEFAZOLIN SODIUM 1 G: 1 POWDER, FOR SOLUTION INTRAMUSCULAR; INTRAVENOUS at 01:10

## 2024-10-31 RX ADMIN — IOHEXOL 68 ML: 350 INJECTION, SOLUTION INTRAVENOUS at 02:10

## 2024-11-01 ENCOUNTER — TELEPHONE (OUTPATIENT)
Dept: SURGERY | Facility: HOSPITAL | Age: 59
End: 2024-11-01
Payer: COMMERCIAL

## 2024-11-04 ENCOUNTER — ANESTHESIA (OUTPATIENT)
Dept: SURGERY | Facility: HOSPITAL | Age: 59
End: 2024-11-04
Payer: COMMERCIAL

## 2024-11-04 ENCOUNTER — HOSPITAL ENCOUNTER (OUTPATIENT)
Facility: HOSPITAL | Age: 59
Discharge: HOME | End: 2024-11-04
Attending: STUDENT IN AN ORGANIZED HEALTH CARE EDUCATION/TRAINING PROGRAM | Admitting: STUDENT IN AN ORGANIZED HEALTH CARE EDUCATION/TRAINING PROGRAM
Payer: COMMERCIAL

## 2024-11-04 VITALS
OXYGEN SATURATION: 100 % | RESPIRATION RATE: 18 BRPM | BODY MASS INDEX: 25.08 KG/M2 | DIASTOLIC BLOOD PRESSURE: 83 MMHG | WEIGHT: 190.13 LBS | TEMPERATURE: 99 F | SYSTOLIC BLOOD PRESSURE: 165 MMHG | HEART RATE: 60 BPM

## 2024-11-04 DIAGNOSIS — G72.41 INCLUSION BODY MYOSITIS: Primary | ICD-10-CM

## 2024-11-04 DIAGNOSIS — M60.9 MYOSITIS: ICD-10-CM

## 2024-11-04 LAB — POCT GLUCOSE: 148 MG/DL (ref 70–110)

## 2024-11-04 PROCEDURE — 36000705 HC OR TIME LEV I EA ADD 15 MIN: Performed by: STUDENT IN AN ORGANIZED HEALTH CARE EDUCATION/TRAINING PROGRAM

## 2024-11-04 PROCEDURE — 25000003 PHARM REV CODE 250: Performed by: ANESTHESIOLOGY

## 2024-11-04 PROCEDURE — 71000015 HC POSTOP RECOV 1ST HR: Performed by: STUDENT IN AN ORGANIZED HEALTH CARE EDUCATION/TRAINING PROGRAM

## 2024-11-04 PROCEDURE — 25000003 PHARM REV CODE 250: Performed by: PHYSICIAN ASSISTANT

## 2024-11-04 PROCEDURE — 63600175 PHARM REV CODE 636 W HCPCS: Performed by: NURSE ANESTHETIST, CERTIFIED REGISTERED

## 2024-11-04 PROCEDURE — 63600175 PHARM REV CODE 636 W HCPCS: Performed by: PHYSICIAN ASSISTANT

## 2024-11-04 PROCEDURE — 20205 DEEP MUSCLE BIOPSY: CPT | Mod: ,,, | Performed by: STUDENT IN AN ORGANIZED HEALTH CARE EDUCATION/TRAINING PROGRAM

## 2024-11-04 PROCEDURE — 36000704 HC OR TIME LEV I 1ST 15 MIN: Performed by: STUDENT IN AN ORGANIZED HEALTH CARE EDUCATION/TRAINING PROGRAM

## 2024-11-04 PROCEDURE — 37000008 HC ANESTHESIA 1ST 15 MINUTES: Performed by: STUDENT IN AN ORGANIZED HEALTH CARE EDUCATION/TRAINING PROGRAM

## 2024-11-04 PROCEDURE — 71000016 HC POSTOP RECOV ADDL HR: Performed by: STUDENT IN AN ORGANIZED HEALTH CARE EDUCATION/TRAINING PROGRAM

## 2024-11-04 PROCEDURE — 63600175 PHARM REV CODE 636 W HCPCS: Performed by: STUDENT IN AN ORGANIZED HEALTH CARE EDUCATION/TRAINING PROGRAM

## 2024-11-04 PROCEDURE — 71000033 HC RECOVERY, INTIAL HOUR: Performed by: STUDENT IN AN ORGANIZED HEALTH CARE EDUCATION/TRAINING PROGRAM

## 2024-11-04 PROCEDURE — 37000009 HC ANESTHESIA EA ADD 15 MINS: Performed by: STUDENT IN AN ORGANIZED HEALTH CARE EDUCATION/TRAINING PROGRAM

## 2024-11-04 RX ORDER — LORAZEPAM 2 MG/ML
0.25 INJECTION INTRAMUSCULAR ONCE AS NEEDED
Status: DISCONTINUED | OUTPATIENT
Start: 2024-11-04 | End: 2024-11-04 | Stop reason: HOSPADM

## 2024-11-04 RX ORDER — MIDAZOLAM HYDROCHLORIDE 1 MG/ML
INJECTION INTRAMUSCULAR; INTRAVENOUS
Status: DISCONTINUED | OUTPATIENT
Start: 2024-11-04 | End: 2024-11-04

## 2024-11-04 RX ORDER — LIDOCAINE HYDROCHLORIDE 20 MG/ML
INJECTION INTRAVENOUS
Status: DISCONTINUED | OUTPATIENT
Start: 2024-11-04 | End: 2024-11-04

## 2024-11-04 RX ORDER — SODIUM CHLORIDE 9 MG/ML
INJECTION, SOLUTION INTRAVENOUS CONTINUOUS
Status: DISCONTINUED | OUTPATIENT
Start: 2024-11-04 | End: 2024-11-04 | Stop reason: HOSPADM

## 2024-11-04 RX ORDER — CEFAZOLIN 2 G/1
2 INJECTION, POWDER, FOR SOLUTION INTRAMUSCULAR; INTRAVENOUS
Status: COMPLETED | OUTPATIENT
Start: 2024-11-04 | End: 2024-11-04

## 2024-11-04 RX ORDER — ONDANSETRON 4 MG/1
4 TABLET, ORALLY DISINTEGRATING ORAL EVERY 8 HOURS PRN
Qty: 21 TABLET | Refills: 0 | Status: SHIPPED | OUTPATIENT
Start: 2024-11-04 | End: 2024-11-11

## 2024-11-04 RX ORDER — FENTANYL CITRATE 50 UG/ML
INJECTION, SOLUTION INTRAMUSCULAR; INTRAVENOUS
Status: DISCONTINUED | OUTPATIENT
Start: 2024-11-04 | End: 2024-11-04

## 2024-11-04 RX ORDER — PROPOFOL 10 MG/ML
VIAL (ML) INTRAVENOUS CONTINUOUS PRN
Status: DISCONTINUED | OUTPATIENT
Start: 2024-11-04 | End: 2024-11-04

## 2024-11-04 RX ORDER — GENTAMICIN 40 MG/ML
INJECTION, SOLUTION INTRAMUSCULAR; INTRAVENOUS
Status: DISCONTINUED | OUTPATIENT
Start: 2024-11-04 | End: 2024-11-04 | Stop reason: HOSPADM

## 2024-11-04 RX ORDER — PROPOFOL 10 MG/ML
VIAL (ML) INTRAVENOUS
Status: DISCONTINUED | OUTPATIENT
Start: 2024-11-04 | End: 2024-11-04

## 2024-11-04 RX ORDER — HYDROMORPHONE HYDROCHLORIDE 2 MG/ML
0.2 INJECTION, SOLUTION INTRAMUSCULAR; INTRAVENOUS; SUBCUTANEOUS EVERY 5 MIN PRN
Status: DISCONTINUED | OUTPATIENT
Start: 2024-11-04 | End: 2024-11-04 | Stop reason: HOSPADM

## 2024-11-04 RX ORDER — LIDOCAINE HYDROCHLORIDE AND EPINEPHRINE 10; 10 UG/ML; MG/ML
INJECTION, SOLUTION INFILTRATION; PERINEURAL
Status: DISCONTINUED | OUTPATIENT
Start: 2024-11-04 | End: 2024-11-04 | Stop reason: HOSPADM

## 2024-11-04 RX ORDER — DIPHENHYDRAMINE HYDROCHLORIDE 50 MG/ML
25 INJECTION INTRAMUSCULAR; INTRAVENOUS EVERY 6 HOURS PRN
Status: DISCONTINUED | OUTPATIENT
Start: 2024-11-04 | End: 2024-11-04 | Stop reason: HOSPADM

## 2024-11-04 RX ORDER — MEPERIDINE HYDROCHLORIDE 50 MG/ML
12.5 INJECTION INTRAMUSCULAR; INTRAVENOUS; SUBCUTANEOUS EVERY 10 MIN PRN
Status: DISCONTINUED | OUTPATIENT
Start: 2024-11-04 | End: 2024-11-04 | Stop reason: HOSPADM

## 2024-11-04 RX ORDER — OXYCODONE HYDROCHLORIDE 5 MG/1
5 TABLET ORAL EVERY 8 HOURS PRN
Qty: 21 TABLET | Refills: 0 | Status: SHIPPED | OUTPATIENT
Start: 2024-11-04 | End: 2024-11-11

## 2024-11-04 RX ORDER — MUPIROCIN 20 MG/G
1 OINTMENT TOPICAL
Status: COMPLETED | OUTPATIENT
Start: 2024-11-04 | End: 2024-11-04

## 2024-11-04 RX ORDER — MUPIROCIN 20 MG/G
OINTMENT TOPICAL
Status: DISCONTINUED | OUTPATIENT
Start: 2024-11-04 | End: 2024-11-04 | Stop reason: HOSPADM

## 2024-11-04 RX ORDER — ACETAMINOPHEN 500 MG
1000 TABLET ORAL
Status: COMPLETED | OUTPATIENT
Start: 2024-11-04 | End: 2024-11-04

## 2024-11-04 RX ADMIN — MIDAZOLAM HYDROCHLORIDE 1 MG: 1 INJECTION INTRAMUSCULAR; INTRAVENOUS at 07:11

## 2024-11-04 RX ADMIN — SODIUM CHLORIDE: 0.9 INJECTION, SOLUTION INTRAVENOUS at 07:11

## 2024-11-04 RX ADMIN — CEFAZOLIN 2 G: 2 INJECTION, POWDER, FOR SOLUTION INTRAMUSCULAR; INTRAVENOUS at 07:11

## 2024-11-04 RX ADMIN — PROPOFOL 30 MG: 10 INJECTION, EMULSION INTRAVENOUS at 07:11

## 2024-11-04 RX ADMIN — FENTANYL CITRATE 12.5 MCG: 50 INJECTION, SOLUTION INTRAMUSCULAR; INTRAVENOUS at 07:11

## 2024-11-04 RX ADMIN — LIDOCAINE HYDROCHLORIDE 100 MG: 20 INJECTION, SOLUTION INTRAVENOUS at 07:11

## 2024-11-04 RX ADMIN — ACETAMINOPHEN 1000 MG: 500 TABLET ORAL at 06:11

## 2024-11-04 RX ADMIN — MUPIROCIN: 20 OINTMENT TOPICAL at 06:11

## 2024-11-04 RX ADMIN — PROPOFOL 20 MG: 10 INJECTION, EMULSION INTRAVENOUS at 07:11

## 2024-11-04 RX ADMIN — MUPIROCIN 1 G: 20 OINTMENT TOPICAL at 06:11

## 2024-11-04 RX ADMIN — PROPOFOL 50 MCG/KG/MIN: 10 INJECTION, EMULSION INTRAVENOUS at 07:11

## 2024-11-04 NOTE — H&P
[facility] Surgery  Neurosurgery  H&P      Subjective:     Chief Complaint/Reason for Admission: weakness    History of Present Illness:   Catalino Mcfadden is a 59 y.o. male past medical history significant for prior C5 through C7 ACDF who presents with a chronic worsening of weakness.  He is being worked up for inclusion body myositis by Dr. Brandi Kaye, who has requested a quadriceps muscle biopsy.     Patient states that his weakness has been going on for years and years.  He has had some weight loss and has been worked up for cancer with a recent CT chest abdomen pelvis.  This did not show anything.  He has had bone biopsy, lymph node biopsy, bronchoscopy without any malignancy identified.  He is still pending a bone marrow biopsy, but had some follow up issues with oncology related to this.     Patient states his weight loss has halted and he has actually started to put some weight back on.  However, he has a hard time getting up from a chair.  He has a hard time walking.  He has right upper extremity pins and needles as if his arm is asleep.  He also states that he does have poor balance    PTA Medications   Medication Sig    carvediloL (COREG) 6.25 MG tablet Take 1 tablet (6.25 mg total) by mouth 2 (two) times daily with meals.    chlorproMAZINE (THORAZINE) 25 MG tablet Take 1-2 tablets (25-50 mg total) by mouth 3 (three) times daily. For hiccups. Can adjust dose for sleepiness    gabapentin (NEURONTIN) 100 MG capsule Take 1 capsule (100 mg total) by mouth 3 (three) times daily.    hydrALAZINE (APRESOLINE) 100 MG tablet Take 1 tablet (100 mg total) by mouth 3 (three) times daily.    NIFEdipine (PROCARDIA XL) 90 MG (OSM) 24 hr tablet Take 1 tablet (90 mg total) by mouth once daily.    senna-docusate 8.6-50 mg (PERICOLACE) 8.6-50 mg per tablet Take 2 tablets by mouth once daily.       Review of patient's allergies indicates:  No Known Allergies    Past Medical History:   Diagnosis Date    Cancer     Diabetes  mellitus, type 2     Diabetic foot ulcer associated with diabetes mellitus due to underlying condition 07/16/2020    ESRD on hemodialysis     Hyperlipidemia     Hypertension     Obese      Past Surgical History:   Procedure Laterality Date    AV FISTULA PLACEMENT Left 07/06/2023    Procedure: CREATION, AV FISTULA;  Surgeon: Daniel Poon MD;  Location: [facility] OR;  Service: Vascular;  Laterality: Left;  RN PREOP 6/28/2023  LABS DAY OF SURGERY    BONE BIOPSY Left 07/17/2020    Procedure: BIOPSY, BONE;  Surgeon: Verona Whitmore DPM;  Location: [facility] OR;  Service: Podiatry;  Laterality: Left;    CERVICAL DISC SURGERY  07/11/2016    COLONOSCOPY N/A 3/25/2024    Procedure: COLONOSCOPY;  Surgeon: Roopa Pérez MD;  Location: [facility] (4TH FLR);  Service: Endoscopy;  Laterality: N/A;  Ref By: Dr. Pérez   constipation prep  Diaylsis Patient Lab has been ordered  3/20-precall complete-MS    COLONOSCOPY N/A 4/8/2024    Procedure: COLONOSCOPY;  Surgeon: Roopa Pérez MD;  Location: [facility] (2ND FLR);  Service: Endoscopy;  Laterality: N/A;  Prep instructions sent via portal/given to pt in clinic  pt had to be r/s at  request  Dialysis Tues,Thurs,Sat-dw  Peg Prep-dw  4/2/24- Labs - dialysis /poor prep on 3/26 - PEG x 2 - extended prep /LVM for precall - ERW  4/5-LVM for precall-KPvt    COLONOSCOPY N/A 4/16/2024    Procedure: COLONOSCOPY;  Surgeon: Jarett Hill MD;  Location: [facility] (2ND FLR);  Service: Endoscopy;  Laterality: N/A;    DEBRIDEMENT Left 07/17/2020    Procedure: DEBRIDEMENT;  Surgeon: Verona Whitmore DPM;  Location: [facility] OR;  Service: Podiatry;  Laterality: Left;    DEBRIDEMENT OF FOOT Right     toes & plantar surface    ENDOBRONCHIAL ULTRASOUND N/A 3/1/2024    Procedure: ENDOBRONCHIAL ULTRASOUND (EBUS);  Surgeon: Francisco Fleming MD;  Location: [facility] 2ND FLR;  Service: Pulmonary;  Laterality: N/A;    ENDOSCOPIC MUCOSAL RESECTION OF COLON N/A 9/9/2024    Procedure: RESECTION,  MUCOSA, COLON, ENDOSCOPIC;  Surgeon: Vikas Quinonez MD;  Location: [facility] (2ND FLR);  Service: Endoscopy;  Laterality: N/A;  6/11 portal-peg-dialysis T Th Sat-+K scheduled-.colonoscopy in 3 months with [facility] doc, [facility], removal of TI polyp and assess prior EMR sites. Cristiano-tt  7/19/24: rescheduled instructions sent via portal-GD  9/3-lvm for pre call-tb  9/4-pre call complete-    ESOPHAGEAL MANOMETRY WITH MEASUREMENT OF IMPEDANCE N/A 03/27/2023    Procedure: MANOMETRY, ESOPHAGUS, WITH IMPEDANCE MEASUREMENT;  Surgeon: Roopa Pérez MD;  Location: [facility] (4TH FLR);  Service: Endoscopy;  Laterality: N/A;  Belching and rumination protocol please  instructions via portal - sm    ESOPHAGOGASTRODUODENOSCOPY N/A 12/16/2022    Procedure: EGD (ESOPHAGOGASTRODUODENOSCOPY);  Surgeon: Eren Francois MD;  Location: [facility];  Service: Endoscopy;  Laterality: N/A;  Pt. on Dialysis. K+ ordered prior to procedure.EC    ESOPHAGOGASTRODUODENOSCOPY N/A 12/5/2023    Procedure: EGD (ESOPHAGOGASTRODUODENOSCOPY);  Surgeon: Kash Johnston MD;  Location: [facility];  Service: Endoscopy;  Laterality: N/A;    FRACTURE SURGERY Right     medial ankle, metal plate present    INJECTION OF ANESTHETIC AGENT AROUND NERVE Right 2/2/2024    Procedure: BLOCK, NERVE STELLATE GANGLION *HOLD ASPIRIN 5 DAYS PRIOR*;  Surgeon: Gokul Gonzalez MD;  Location: [facility] PAIN MGT;  Service: Pain Management;  Laterality: Right;  [phone]    INSERTION OF TUNNELED CENTRAL VENOUS CATHETER (CVC) WITH SUBCUTANEOUS PORT N/A 06/11/2021    Procedure: TUNNEL CATH INSERTION WITHOUT PORT;  Surgeon: Dosc Diagnostic Provider;  Location: [facility] OR;  Service: Radiology;  Laterality: N/A;  11AM START---PHONE PREOP 6/10/21---COVID POSTIVE ON 7/2020---NO S/S    left leg orthopedic surgery Left     REVISION OF ARTERIOVENOUS FISTULA Left 3/14/2024    Procedure: REVISION, AV FISTULA;  Surgeon: Daniel Poon MD;  Location: [facility] OR;  Service: Vascular;   "Laterality: Left;  RN preop 3/6/2024----NEED ORDERS    UPPER GASTROINTESTINAL ENDOSCOPY       Family History       Problem Relation (Age of Onset)    Heart disease Father          Tobacco Use    Smoking status: Never    Smokeless tobacco: Never   Substance and Sexual Activity    Alcohol use: Not Currently     Comment: occ rare beer    Drug use: No    Sexual activity: Not Currently     Review of Systems  Objective:     Weight: 86.2 kg (190 lb 1.8 oz)  Body mass index is 25.08 kg/m².  Vital Signs (Most Recent):  Temp: 97.9 °F (36.6 °C) (11/04/24 0615)  Pulse: 64 (11/04/24 0615)  Resp: 18 (11/04/24 0615)  BP: (!) 179/84 (11/04/24 0615)  SpO2: 97 % (11/04/24 0615) Vital Signs (24h Range):  Temp:  [97.9 °F (36.6 °C)] 97.9 °F (36.6 °C)  Pulse:  [64] 64  Resp:  [18] 18  SpO2:  [97 %] 97 %  BP: (179)/(84) 179/84                              Hemodialysis AV Fistula Left upper arm (Active)       Neurosurgery Physical Exam    Physical Exam:  General: well developed, well nourished, no distress  Head: normocephalic, atraumatic  Neurologic: Alert and oriented. Thought content appropriate  Speech: Fluent  Cranial nerves: face symmetric   Eyes: pupils equal  Pulmonary: normal respirations  Sensory: intact to light touch throughout  Motor Strength: Moves all extremities spontaneously with good tone.  Full strength upper and lower extremities. No abnormal movements seen.                  Delt Bi Tri Wrist ext.  IO  RUE:      5    5   5        5          4    4  LUE:      5    5   5        5          4    4              HF KE EHL DF PF  RLE      4    5     5     5    5  LLE:      4    5     5     5    5     DTR's - 1+ patellar on right, 2+ patellar on left  Musa: absent  Clonus: absent     Gait:  With cane, labored.  Has a hard time getting on exam table    Significant Labs:  No results for input(s): "GLU", "NA", "K", "CL", "CO2", "BUN", "CREATININE", "CALCIUM", "MG" in the last 48 hours.  No results for input(s): "WBC", " ""HGB", "HCT", "PLT" in the last 48 hours.  No results for input(s): "LABPT", "INR", "APTT" in the last 48 hours.  Microbiology Results (last 7 days)       ** No results found for the last 168 hours. **          Significant Diagnostics:    I reviewed patient's MRI of the cervical spine which is dated 12/12/2023. There is postoperative change from prior C5-C7 ACDF. Despite the ACDF, there is persistent osteophyte on the left at the C7 level as well as the C6 level which does cause some spinal cord contact at least. There is also signal change on the left at the C2 level without any spinal cord compression as well as on the right at the C3 level without any spinal cord compression. These do not necessarily look like myelopathic changes, but could reflect some inflammatory condition.       Assessment/Plan:     Catalino Mcfadden is a 59 y.o. 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 versus other inflammatory condition versus cervical myelopathy    -planning for left vastus lateralis muscle biopsy 11/04/2024, at request of patient's neurologist  -risks benefits indications alternatives of the procedure discussed with the patient and he would like to proceed  -counseled patient he could have underlying cervical myelopathy as well and may need new cervical imaging  -also could consider lumbar puncture in future considering patient's abnormal cord signal not corresponding with cord compression      Adithya Lewis MD  Neurosurgery  [facility] [place] [facility]    "

## 2024-11-04 NOTE — OP NOTE
[facility] Surgery  Neurosurgery  Operative Note    OP Note      Date of Procedure: 11/4/2024       Pre-Operative Diagnosis: Weakness of extremity [R29.898]    Post-Operative Diagnosis: Post-Op Diagnosis Codes:     * Weakness of extremity [R29.898]    Anesthesia: Monitor Anesthesia Care    Procedures performed:  Left vastus lateralis muscle biopsy     Surgeon: Adithya Lewis MD    Assistant:: None    Indication for Procedure:  Patient is a 59-year-old male with years of weakness.  He has a history of cervical myelopathy status post ACDF by a different provider.  He is being worked up for myositis with the top differential being inclusion body myositis.  A muscle sample was requested by the patient's neurologist, Dr. Kaye.  Signed informed consent was documented    Operative Note:  Patient was identified in the preoperative holding area using 2 independent patient identifiers.  He was taken to the operating room where MAC anesthesia was induced.  He was supine and a small bump was placed under his left leg.  The left thigh was cleaned with rubbing alcohol and an incision was marked across the vastus lateralis muscle.  This was then prepped and draped in the usual sterile manner.  A time-out was held identifying the correct patient, side, site, procedure to be performed.  All parties agreed and imaging was displayed.    After the time-out, local anesthetic was injected subcutaneously.  Approximately 10 cc was used in the subcutaneous area.  The incision was then opened with a 10. Blade knife down through the dermis.  Gelpi retractors were placed.  Bovie electrocautery was used to take this down to the fascia.  There was a large amount of subdermal fat above the fascia.  Once we got to the fascia, a field block with local anesthetic consisting of approximately 20 cc was made around the periphery of our target area.  Once this was done, the fascia was opened with Metzenbaum scissors.  There was again generous fat  infiltration under the fascia.  To reasonably sized pieces of muscle were identified and were isolated with hemostats.  These were then cut with Metzenbaum scissors and placed on a piece of Telfa to be sent off to pathology.  Hemostasis was obtained with bipolar electrocautery.  Copious irrigation was performed with antibiotic infused irrigation.  The fascia was then closed with simple interrupted 2-0 Vicryl sutures.  Another 10 cc local anesthetic was injected into the muscle through the fascia.  The layer of subdermal fat was closed loosely with 2-0 Vicryl sutures.  The dermis was reapproximated with inverted interrupted 2-0 Vicryl sutures.  The skin was then reapproximated with a 3-0 simple running Rapide suture.  The wound was then cleansed and dried and a sterile dressing was applied consisting of Telfa and Tegaderm.  All sponge and needle counts were correct x2 at the end of the case.  There were no complications.  I was scrubbed and present for the whole case.    EBL:20cc  Specimen Sent: Vastus lateralis samples x 2 sent to pathology    Adithya Lewis  Neurosurgery

## 2024-11-04 NOTE — DISCHARGE INSTRUCTIONS
DIET: You may resume your home diet. If nausea is present, increase your diet gradually with fluids and bland  Foods    ACTIVITY LEVEL: You have received sedation or an anesthetic, you may feel sleepy for several hours. Rest until  you are more awake. Gradually resume your normal activities    Dressing :-keep dressing on for 3 days  -once dressing is off, keep wound clean with regular soap and water daily  -do not submerge incision in water for 4 weeks  -okay to shower once dressing is off  -please call clinic with any drainage from the wound, fever not relieved by Tylenol, any appearance of infection including pus or discharge from the incision  -activities as tolerated     Medications: Pain medication should be taken only if needed and as directed. If antibiotics are prescribed, the  medication should be taken until completed.    No driving, alcoholic beverages or signing legal documents for next 24 hours or while taking pain  medication.    CALL THE DOCTOR:  For any obvious bleeding (some dried blood over the incision is normal).  Redness, swelling, foul smell around incision or fever over 101.  Shortness of breath, Coughing up Bloody sputum, Pains or Swelling in your Calves .  Persistent pain or nausea not relieved by medication.    If any unusual problems or difficulties occur contact your doctor. If you cannot contact your doctor but  feel your signs and symptoms warrant a physicians attention return to the emergency room.    Fall Prevention  Millions of people fall every year and injure themselves. You may have had anesthesia or sedation which may increase your risk of falling. You may have health issues that put you at an increased risk of falling.     Here are ways to reduce your risk of falling.    Make your home safe by keeping walkways clear of objects you may trip over.  Use non-slip pads under rugs. Do not use area rugs or small throw rugs.  Use non-slip mats in bathtubs and showers.  Install  handrails and lights on staircases.  Do not walk in poorly lit areas.  Do not stand on chairs or wobbly ladders.  Use caution when reaching overhead or looking upward. This position can cause a loss of balance.  Be sure your shoes fit properly, have non-slip bottoms and are in good condition.   Wear shoes both inside and out. Avoid going barefoot or wearing slippers.  Be cautious when going up and down stairs, curbs, and when walking on uneven sidewalks.  If your balance is poor, consider using a cane or walker.  If your fall was related to alcohol use, stop or limit alcohol intake.   If your fall was related to use of sleeping medicines, talk to your doctor about this. You may need to reduce your dosage at bedtime if you awaken during the night to go to the bathroom.    To reduce the need for nighttime bathroom trips:  Avoid drinking fluids for several hours before going to bed  Empty your bladder before going to bed  Men can keep a urinal at the bedside  Stay as active as you can. Balance, flexibility, strength, and endurance all come from exercise. They all play a role in preventing falls. Ask your healthcare provider which types of activity are right for you.  Get your vision checked on a regular basis.  If you have pets, know where they are before you stand up or walk so you don't trip over them.  Use night lights.

## 2024-11-04 NOTE — TRANSFER OF CARE
Anesthesia Transfer of Care Note    Patient: Catalino Mcfadden    Procedure(s) Performed: Procedure(s) (LRB):  BIOPSY, MUSCLE (Left)    Patient location: PACU    Anesthesia Type: general    Transport from OR: Transported from OR on room air with adequate spontaneous ventilation    Post pain: adequate analgesia    Post assessment: no apparent anesthetic complications and tolerated procedure well    Post vital signs: stable    Level of consciousness: sedated    Nausea/Vomiting: no nausea/vomiting    Complications: none    Transfer of care protocol was followed      Last vitals: Visit Vitals  BP (!) 174/80   Pulse 60   Temp 36.2 °C (97.2 °F)   Resp 10   Wt 86.2 kg (190 lb 1.8 oz)   SpO2 100%   BMI 25.08 kg/m²

## 2024-11-04 NOTE — BRIEF OP NOTE
[facility] Surgery  Surgery Department  Operative Note    SUMMARY     Date of Procedure: 11/4/2024     Procedure: Procedure(s) (LRB):  BIOPSY, MUSCLE (Left)     Surgeons and Role:     * Adithya Lewis MD - Primary    Assisting Surgeon: None    Pre-Operative Diagnosis: Weakness of extremity [R29.898]    Post-Operative Diagnosis: Post-Op Diagnosis Codes:     * Weakness of extremity [R29.898]    Anesthesia: Monitor Anesthesia Care    Operative Findings (including complications, if any):  See op note    Description of Technical Procedures:  See op note    Significant Surgical Tasks Conducted by the Assistant(s), if Applicable:  N/a    Estimated Blood Loss (EBL):  20 cc           Implants: * No implants in log *    Specimens:   Specimen (24h ago, onward)       Start     Ordered    11/04/24 0823  Specimen to Pathology, Surgery Neurosurgery  Once        Comments: Pre-op Diagnosis: Weakness of extremity [R29.898]Procedure(s):BIOPSY, MUSCLE Number of specimens: 1Name of specimens: left quadriceps muscle biopsy     References:    Click here for ordering Quick Tip   Question Answer Comment   Procedure Type: Neurosurgery    Specimen Class: Routine/Screening    Release to patient Immediate        11/04/24 0823                            Condition: Good    Disposition: PACU - hemodynamically stable.    Attestation:  I was scrubbed and present throughout the procedure

## 2024-11-04 NOTE — DISCHARGE SUMMARY
[facility] - Surgery  Discharge Note  Short Stay    Procedure(s) (LRB):  BIOPSY, MUSCLE (Left)      OUTCOME: Patient tolerated treatment/procedure well without complication and is now ready for discharge.    DISPOSITION: Home or Self Care    FINAL DIAGNOSIS:  <principal problem not specified>    FOLLOWUP: In clinic    DISCHARGE INSTRUCTIONS:    -keep dressing on for 3 days  -once dressing is off, keep wound clean with regular soap and water daily  -do not submerge incision in water for 4 weeks  -okay to shower once dressing is off  -please call clinic with any drainage from the wound, fever not relieved by Tylenol, any appearance of infection including pus or discharge from the incision  -activities as tolerated    TIME SPENT ON DISCHARGE: 20 minutes

## 2024-11-06 ENCOUNTER — PATIENT OUTREACH (OUTPATIENT)
Dept: ADMINISTRATIVE | Facility: OTHER | Age: 59
End: 2024-11-06
Payer: COMMERCIAL

## 2024-11-06 ENCOUNTER — OFFICE VISIT (OUTPATIENT)
Dept: PODIATRY | Facility: CLINIC | Age: 59
End: 2024-11-06
Payer: COMMERCIAL

## 2024-11-06 DIAGNOSIS — E11.22 TYPE 2 DIABETES MELLITUS WITH STAGE 5 CHRONIC KIDNEY DISEASE: Primary | ICD-10-CM

## 2024-11-06 DIAGNOSIS — N18.5 TYPE 2 DIABETES MELLITUS WITH STAGE 5 CHRONIC KIDNEY DISEASE: Primary | ICD-10-CM

## 2024-11-06 DIAGNOSIS — M20.5X2 HALLUX LIMITUS, ACQUIRED, LEFT: ICD-10-CM

## 2024-11-06 DIAGNOSIS — Z86.31 HISTORY OF DIABETIC ULCER OF FOOT: ICD-10-CM

## 2024-11-06 DIAGNOSIS — L90.9 PLANTAR FAT PAD ATROPHY: ICD-10-CM

## 2024-11-06 DIAGNOSIS — M20.5X1 HALLUX LIMITUS, ACQUIRED, RIGHT: ICD-10-CM

## 2024-11-06 DIAGNOSIS — L84 PRE-ULCERATIVE CALLUSES: ICD-10-CM

## 2024-11-06 PROCEDURE — 99999 PR PBB SHADOW E&M-EST. PATIENT-LVL III: CPT | Mod: PBBFAC,,, | Performed by: PODIATRIST

## 2024-11-06 PROCEDURE — 99213 OFFICE O/P EST LOW 20 MIN: CPT | Mod: S$GLB,,, | Performed by: PODIATRIST

## 2024-11-06 PROCEDURE — 3044F HG A1C LEVEL LT 7.0%: CPT | Mod: CPTII,S$GLB,, | Performed by: PODIATRIST

## 2024-11-06 PROCEDURE — 1111F DSCHRG MED/CURRENT MED MERGE: CPT | Mod: CPTII,S$GLB,, | Performed by: PODIATRIST

## 2024-11-06 PROCEDURE — 3066F NEPHROPATHY DOC TX: CPT | Mod: CPTII,S$GLB,, | Performed by: PODIATRIST

## 2024-11-06 PROCEDURE — 1160F RVW MEDS BY RX/DR IN RCRD: CPT | Mod: CPTII,S$GLB,, | Performed by: PODIATRIST

## 2024-11-06 PROCEDURE — 1159F MED LIST DOCD IN RCRD: CPT | Mod: CPTII,S$GLB,, | Performed by: PODIATRIST

## 2024-11-06 NOTE — PROGRESS NOTES
CHW - Case Closure    Oliva Viramontes Community Health Worker spoke to patient via telephone today.   Pt/Caregiver reported: Mr. Mcfadden stated that he does not have any unmet needs at this time.  Pt is receiving case management services by his insurance company [employer].  CHW called [employer] to confirm services.     Mr. Mcfadden denied any additional needs at this time and agrees with episode closure at this time.  CHW provided patient with Community Health Worker's contact information and encouraged him/her to contact this Community Health Worker if additional needs arise.

## 2024-11-11 ENCOUNTER — PATIENT MESSAGE (OUTPATIENT)
Dept: NEUROSURGERY | Facility: CLINIC | Age: 59
End: 2024-11-11
Payer: COMMERCIAL

## 2024-11-11 NOTE — ANESTHESIA POSTPROCEDURE EVALUATION
Anesthesia Post Evaluation    Patient: Catalino Mcfadden    Procedure(s) Performed: Procedure(s) (LRB):  BIOPSY, MUSCLE (Left)    Final Anesthesia Type: general      Patient location during evaluation: PACU  Patient participation: Yes- Able to Participate  Level of consciousness: awake and alert  Post-procedure vital signs: reviewed and stable  Pain management: adequate  Airway patency: patent    PONV status at discharge: No PONV  Anesthetic complications: no      Respiratory status: spontaneous ventilation and room air  Hydration status: euvolemic  Follow-up not needed.              Vitals Value Taken Time   /83 11/04/24 0954   Temp 36.9 °C (98.5 °F) 11/04/24 0954   Pulse 60 11/04/24 0954   Resp 18 11/04/24 0954   SpO2 100 % 11/04/24 0954         Event Time   Out of Recovery 08:58:00         Pain/Madiha Score: No data recorded

## 2024-11-12 NOTE — PROGRESS NOTES
Daniel Poon MD [facility]                         02/14/2022    HPI:  Catalino Mcfadden is a 56 y.o. male with   Patient Active Problem List   Diagnosis    Type 2 diabetes mellitus with stage 5 chronic kidney disease not on chronic dialysis, with long-term current use of insulin    Essential hypertension    Hyperlipidemia, acquired    ED (erectile dysfunction)    History of diabetic ulcer of foot    Osteomyelitis of second toe of left foot    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    Long term (current) use of antibiotics    Pre-operative examination    Acute osteomyelitis of metatarsal bone of left foot    Diabetic ulcer of toe of left foot    Hyponatremia    Acute kidney injury superimposed on chronic kidney disease    Osteomyelitis of left foot    Hypoalbuminemia    Healed ulcer of left foot on examination    Iron deficiency anemia    ESRD (end stage renal disease)    Metabolic acidosis    Dialysis patient    Anemia due to chronic kidney disease, on chronic dialysis    being managed by PCP and specialists who is in need for long term dialysis access.  Patient is R handed.  Has been on HD for months.  No previous access surgery.  No defibrillator or pacemaker.  No recent UTI.    No extremity pain and swelling.     no MI  no Stroke  yes DM  yes HTN  no Lupus  no nephropathy  Tobacco use: denies    Past Medical History:   Diagnosis Date    CKD (chronic kidney disease), stage III 07/16/2020    CKD stage 3 due to type 1 diabetes mellitus     CKD stage 3 due to type 2 diabetes mellitus     Diabetes mellitus, type 2     Diabetic foot ulcer associated with diabetes mellitus due to underlying condition 07/16/2020    Diabetic foot ulcers     Edema 08/03/2020    generalized, including genital area    Hyperlipidemia     Hypertension     Obese      Past Surgical  History:   Procedure Laterality Date    BONE BIOPSY Left 7/17/2020    Procedure: BIOPSY, BONE;  Surgeon: Verona Whitmore DPM;  Location: [facility] OR;  Service: Podiatry;  Laterality: Left;    CERVICAL DISC SURGERY  07/11/2016    DEBRIDEMENT Left 7/17/2020    Procedure: DEBRIDEMENT;  Surgeon: Verona Whitmore DPM;  Location: [facility] OR;  Service: Podiatry;  Laterality: Left;    DEBRIDEMENT OF FOOT Right     toes & plantar surface    FRACTURE SURGERY Right     medial ankle, metal plate present    INSERTION OF TUNNELED CENTRAL VENOUS CATHETER (CVC) WITH SUBCUTANEOUS PORT N/A 6/11/2021    Procedure: TUNNEL CATH INSERTION WITHOUT PORT;  Surgeon: Jose Manuel Diagnostic Provider;  Location: [facility] OR;  Service: Radiology;  Laterality: N/A;  11AM START---PHONE PREOP 6/10/21---COVID POSTIVE ON 7/2020---NO S/S    left leg orthopedic surgery Left      Family History   Problem Relation Age of Onset    Heart disease Father      Social History     Socioeconomic History    Marital status:    Tobacco Use    Smoking status: Never Smoker    Smokeless tobacco: Never Used   Substance and Sexual Activity    Alcohol use: Not Currently     Alcohol/week: 0.0 standard drinks     Comment: social    Drug use: No   Social History Narrative    Caregiver       Current Outpatient Medications:     albuterol (VENTOLIN HFA) 90 mcg/actuation inhaler, Inhale 2 puffs into the lungs every 6 (six) hours as needed for Wheezing or Shortness of Breath. Rescue, Disp: 18 g, Rfl: 1    atorvastatin (LIPITOR) 40 MG tablet, Take 1 tablet (40 mg total) by mouth once daily., Disp: 90 tablet, Rfl: 3    blood sugar diagnostic Strp, 1 strip by Misc.(Non-Drug; Combo Route) route 3 (three) times daily. Patient needs One Touch Test Strips (Berio)., Disp: 300 strip, Rfl: 2    carvediloL (COREG) 25 MG tablet, Take 1 tablet (25 mg total) by mouth 2 (two) times daily., Disp: 180 tablet, Rfl: 3    doxycycline (VIBRAMYCIN) 100 MG Cap, Take 1 capsule (100 mg total) by  "mouth every 12 (twelve) hours., Disp: 20 capsule, Rfl: 0    dulaglutide (TRULICITY) 0.75 mg/0.5 mL pen injector, INJECT 0.5 ML UNDER THE SKIN EVERY 7 DAYS, Disp: 12 pen, Rfl: 0    hydrALAZINE (APRESOLINE) 50 MG tablet, Take 1 tablet (50 mg total) by mouth 3 (three) times daily., Disp: 300 tablet, Rfl: 3    methoxy peg-epoetin beta (MIRCERA INJ), 100 mcg., Disp: , Rfl:     NIFEdipine (ADALAT CC) 60 MG TbSR, TAKE 1 TABLET BY MOUTH TWICE A DAY, Disp: 180 tablet, Rfl: 1    pen needle, diabetic 32 gauge x 3/16" Ndle, 1 each by Misc.(Non-Drug; Combo Route) route 5 (five) times daily., Disp: 100 each, Rfl: 10    sevelamer carbonate (RENVELA) 800 mg Tab, Take 1 tablet (800 mg total) by mouth 3 (three) times daily with meals., Disp: 90 tablet, Rfl: 11    sodium chloride 0.9% SolP 100 mL with iron sucrose 100 mg iron/5 mL Soln 100 mg, 50 mg., Disp: , Rfl:     torsemide (DEMADEX) 20 MG Tab, Take 1 tablet (20 mg total) by mouth once daily., Disp: 30 tablet, Rfl: 5    blood-glucose meter kit, To check BG 3 times daily, to use with insurance preferred meter; Accu check., Disp: 1 each, Rfl: 0    sodium bicarbonate 650 MG tablet, Take 1 tablet (650 mg total) by mouth 3 (three) times daily., Disp: 90 tablet, Rfl: 0    tuberculin,purif.prot.deriv. (TUBERSOL IDRM), Inject 0.1 mLs into the skin., Disp: , Rfl:     UNABLE TO FIND, Heparin Sodium (Porcine) 1,000 Units/mL Systemic, Disp: , Rfl:     REVIEW OF SYSTEMS:  General: No fevers or chills; ENT: No sore throat; Allergy and Immunology: no persistent infections; Hematological and Lymphatic: No history of bleeding or easy bruising; Endocrine: negative; Respiratory: no cough, shortness of breath, or wheezing; Cardiovascular: no chest pain or dyspnea on exertion; Gastrointestinal: no abdominal pain/back, change in bowel habits, or bloody stools; Genito-Urinary: no dysuria, trouble voiding, or hematuria; Musculoskeletal: negative; Neurological: no TIA or stroke symptoms; " Psychiatric: no nervousness, anxiety or depression.    PHYSICAL EXAM:                General appearance:  Alert, well-appearing, and in no distress.  Oriented to person, place, and time                    Neurological: Normal speech, no focal findings noted; CN II - XII grossly intact. BUE with sensation to light touch.            Musculoskeletal: Digits/nail without cyanosis/clubbing.  Strength 5/5 BUE.                    Neck: Supple, no significant adenopathy, no carotid bruit can be auscultated                  Chest:  Clear to auscultation, no wheezes, rales or rhonchi, symmetric air entry. No use of accessory muscles               Cardiac: Normal rate and regular rhythm, S1 and S2 normal            Abdomen: Soft, nontender, nondistended, no masses or organomegaly, no hernia     No rebound tenderness noted; bowel sounds normal     Pulsatile aortic mass is non palpable.     No groin adenopathy      Extremities:   2 + R radial pulse, 2 + L radial pulse     2 + R brachial pulse, 2 + L brachial pulse     no RUE edema, no LUE edema     neg Dutch's test RUE, neg Dutch's test LUE    Skin: No tissue loss    LAB RESULTS:  No results found for: CBC  Lab Results   Component Value Date    LABPROT 12.0 08/03/2020    INR 1.1 08/03/2020     Lab Results   Component Value Date     (L) 02/09/2022     (L) 02/09/2022    K 4.1 02/09/2022    K 4.1 02/09/2022    CL 93 (L) 02/09/2022    CL 93 (L) 02/09/2022    CO2 29 02/09/2022    CO2 29 02/09/2022     (H) 02/09/2022     (H) 02/09/2022    BUN 34 (H) 02/09/2022    BUN 34 (H) 02/09/2022    CREATININE 6.5 (H) 02/09/2022    CREATININE 6.5 (H) 02/09/2022    CALCIUM 9.5 02/09/2022    CALCIUM 9.5 02/09/2022    ANIONGAP 10 02/09/2022    ANIONGAP 10 02/09/2022    EGFRNONAA 8.7 (A) 02/09/2022    EGFRNONAA 8.7 (A) 02/09/2022     Lab Results   Component Value Date    WBC 4.99 02/09/2022    WBC 4.99 02/09/2022    RBC 4.07 (L) 02/09/2022    RBC 4.07 (L) 02/09/2022    HGB  11.7 (L) 02/09/2022    HGB 11.7 (L) 02/09/2022    HCT 37.3 (L) 02/09/2022    HCT 37.3 (L) 02/09/2022    MCV 92 02/09/2022    MCV 92 02/09/2022    MCH 28.7 02/09/2022    MCH 28.7 02/09/2022    MCHC 31.4 (L) 02/09/2022    MCHC 31.4 (L) 02/09/2022    RDW 16.4 (H) 02/09/2022    RDW 16.4 (H) 02/09/2022     (L) 02/09/2022     (L) 02/09/2022    MPV 10.0 02/09/2022    MPV 10.0 02/09/2022    GRAN 3.3 02/09/2022    GRAN 65.2 02/09/2022    GRAN 3.3 02/09/2022    GRAN 65.2 02/09/2022    LYMPH 0.9 (L) 02/09/2022    LYMPH 17.2 (L) 02/09/2022    LYMPH 0.9 (L) 02/09/2022    LYMPH 17.2 (L) 02/09/2022    MONO 0.7 02/09/2022    MONO 13.8 02/09/2022    MONO 0.7 02/09/2022    MONO 13.8 02/09/2022    EOS 0.1 02/09/2022    EOS 0.1 02/09/2022    BASO 0.03 02/09/2022    BASO 0.03 02/09/2022    EOSINOPHIL 2.4 02/09/2022    EOSINOPHIL 2.4 02/09/2022    BASOPHIL 0.6 02/09/2022    BASOPHIL 0.6 02/09/2022    DIFFMETHOD Automated 02/09/2022    DIFFMETHOD Automated 02/09/2022     .  Lab Results   Component Value Date    HGBA1C 5.0 02/09/2022       IMAGING:  All pertinent imaging has been reviewed and interpreted independently.    IMP/PLAN:  56 y.o. male with   Patient Active Problem List   Diagnosis    Type 2 diabetes mellitus with stage 5 chronic kidney disease not on chronic dialysis, with long-term current use of insulin    Essential hypertension    Hyperlipidemia, acquired    ED (erectile dysfunction)    History of diabetic ulcer of foot    Osteomyelitis of second toe of left foot    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    Long term (current) use of antibiotics    Pre-operative examination    Acute osteomyelitis of metatarsal bone of left foot    Diabetic ulcer of toe of left foot    Hyponatremia    Acute kidney injury superimposed on chronic kidney disease    Osteomyelitis of left foot    Hypoalbuminemia    Healed ulcer of left  foot on examination    Iron deficiency anemia    ESRD (end stage renal disease)    Metabolic acidosis    Dialysis patient    Anemia due to chronic kidney disease, on chronic dialysis    being managed by PCP and specialists who is here today for evaluation of long term HD access.    -Recommend LUE AVF vs AVG - risks and benefits discussed with patient including nerve damage, loss of hand and hand function, need for revision or reintervention, poor cosmesis and steal syndrome.  Patient states understanding and desires to proceed with surgery.  -Vein mapping - call with date  -Cont renal diet  -To preop       I spent 13 minutes evaluating this patient and greater than 50% of the time was spent counseling, coordinator care and discussing the plan of care.  All questions were answered and patient stated understanding with agreement with the above treatment plan.    Daniel Poon MD [facility]  Vascular and Endovascular Surgery         VTAMA Counseling: I discussed with the patient that VTAMA is not for use in the eyes, mouth or mouth. They should call the office if they develop any signs of allergic reactions to VTAMA. The patient verbalized understanding of the proper use and possible adverse effects of VTAMA.  All of the patient's questions and concerns were addressed. Minoxidil Pregnancy And Lactation Text: This medication has not been assigned a Pregnancy Risk Category but animal studies failed to show danger with the topical medication. It is unknown if the medication is excreted in breast milk. Cimzia Counseling:  I discussed with the patient the risks of Cimzia including but not limited to immunosuppression, allergic reactions and infections.  The patient understands that monitoring is required including a PPD at baseline and must alert us or the primary physician if symptoms of infection or other concerning signs are noted. Infliximab Pregnancy And Lactation Text: This medication is Pregnancy Category B and is considered safe during pregnancy. It is unknown if this medication is excreted in breast milk. Low Dose Naltrexone Pregnancy And Lactation Text: Naltrexone is pregnancy category C.  There have been no adequate and well-controlled studies in pregnant women.  It should be used in pregnancy only if the potential benefit justifies the potential risk to the fetus.   Limited data indicates that naltrexone is minimally excreted into breastmilk. Topical Retinoid Pregnancy And Lactation Text: This medication is Pregnancy Category C. It is unknown if this medication is excreted in breast milk. Albendazole Counseling:  I discussed with the patient the risks of albendazole including but not limited to cytopenia, kidney damage, nausea/vomiting and severe allergy.  The patient understands that this medication is being used in an off-label manner. Quinolones Pregnancy And Lactation Text: This medication is Pregnancy Category C and it isn't know if it is safe during pregnancy. It is also excreted in breast milk. Spironolactone Counseling: Patient advised regarding risks of diarrhea, abdominal pain, hyperkalemia, birth defects (for female patients), liver toxicity and renal toxicity. The patient may need blood work to monitor liver and kidney function and potassium levels while on therapy. The patient verbalized understanding of the proper use and possible adverse effects of spironolactone.  All of the patient's questions and concerns were addressed. Low Dose Naltrexone Counseling- I discussed with the patient the potential risks and side effects of low dose naltrexone including but not limited to: more vivid dreams, headaches, nausea, vomiting, abdominal pain, fatigue, dizziness, and anxiety. Topical Retinoid counseling:  Patient advised to apply a pea-sized amount only at bedtime and wait 30 minutes after washing their face before applying.  If too drying, patient may add a non-comedogenic moisturizer. The patient verbalized understanding of the proper use and possible adverse effects of retinoids.  All of the patient's questions and concerns were addressed. Xolair Pregnancy And Lactation Text: This medication is Pregnancy Category B and is considered safe during pregnancy. This medication is excreted in breast milk. Clindamycin Pregnancy And Lactation Text: This medication can be used in pregnancy if certain situations. Clindamycin is also present in breast milk. Calcipotriene Pregnancy And Lactation Text: The use of this medication during pregnancy or lactation is not recommended as there is insufficient data. Cyclosporine Counseling:  I discussed with the patient the risks of cyclosporine including but not limited to hypertension, gingival hyperplasia,myelosuppression, immunosuppression, liver damage, kidney damage, neurotoxicity, lymphoma, and serious infections. The patient understands that monitoring is required including baseline blood pressure, CBC, CMP, lipid panel and uric acid, and then 1-2 times monthly CMP and blood pressure. Thalidomide Counseling: I discussed with the patient the risks of thalidomide including but not limited to birth defects, anxiety, weakness, chest pain, dizziness, cough and severe allergy. Litfulo Counseling: I discussed with the patient the risks of Litfulo therapy including but not limited to upper respiratory tract infections, shingles, cold sores, and nausea. Live vaccines should be avoided.  This medication has been linked to serious infections; higher rate of mortality; malignancy and lymphoproliferative disorders; major adverse cardiovascular events; thrombosis; gastrointestinal perforations; neutropenia; lymphopenia; anemia; liver enzyme elevations; and lipid elevations. Litfulo Pregnancy And Lactation Text: Based on animal studies, Lifulo may cause embryo-fetal harm when administered to pregnant women.  The medication should not be used in pregnancy.  Breastfeeding is not recommended during treatment. Griseofulvin Counseling:  I discussed with the patient the risks of griseofulvin including but not limited to photosensitivity, cytopenia, liver damage, nausea/vomiting and severe allergy.  The patient understands that this medication is best absorbed when taken with a fatty meal (e.g., ice cream or french fries). Vtama Pregnancy And Lactation Text: It is unknown if this medication can cause problems during pregnancy and breastfeeding. Nemluvio Counseling: I discussed with the patient the risks of nemolizumab including but not limited to headache, gastrointestinal complaints, nasopharyngitis, musculoskeletal complaints, injection site reactions, and allergic reactions. The patient understands that monitoring is required and they must alert us or the primary physician if any side effects are noted. Cimzia Pregnancy And Lactation Text: This medication crosses the placenta but can be considered safe in certain situations. Cimzia may be excreted in breast milk. Mirvaso Counseling: Mirvaso is a topical medication which can decrease superficial blood flow where applied. Side effects are uncommon and include stinging, redness and allergic reactions. Niacinamide Counseling: I recommended taking niacin or niacinamide, also know as vitamin B3, twice daily. Recent evidence suggests that taking vitamin B3 (500 mg twice daily) can reduce the risk of actinic keratoses and non-melanoma skin cancers. Side effects of vitamin B3 include flushing and headache. Tazorac Counseling:  Patient advised that medication is irritating and drying.  Patient may need to apply sparingly and wash off after an hour before eventually leaving it on overnight.  The patient verbalized understanding of the proper use and possible adverse effects of tazorac.  All of the patient's questions and concerns were addressed. Albendazole Pregnancy And Lactation Text: This medication is Pregnancy Category C and it isn't known if it is safe during pregnancy. It is also excreted in breast milk. Rifampin Counseling: I discussed with the patient the risks of rifampin including but not limited to liver damage, kidney damage, red-orange body fluids, nausea/vomiting and severe allergy. Spironolactone Pregnancy And Lactation Text: This medication can cause feminization of the male fetus and should be avoided during pregnancy. The active metabolite is also found in breast milk. Cimetidine Counseling:  I discussed with the patient the risks of Cimetidine including but not limited to gynecomastia, headache, diarrhea, nausea, drowsiness, arrhythmias, pancreatitis, skin rashes, psychosis, bone marrow suppression and kidney toxicity. Doxycycline Counseling:  Patient counseled regarding possible photosensitivity and increased risk for sunburn.  Patient instructed to avoid sunlight, if possible.  When exposed to sunlight, patients should wear protective clothing, sunglasses, and sunscreen.  The patient was instructed to call the office immediately if the following severe adverse effects occur:  hearing changes, easy bruising/bleeding, severe headache, or vision changes.  The patient verbalized understanding of the proper use and possible adverse effects of doxycycline.  All of the patient's questions and concerns were addressed. Thalidomide Pregnancy And Lactation Text: This medication is Pregnancy Category X and is absolutely contraindicated during pregnancy. It is unknown if it is excreted in breast milk. Cyclosporine Pregnancy And Lactation Text: This medication is Pregnancy Category C and it isn't know if it is safe during pregnancy. This medication is excreted in breast milk. Cantharidin Counseling:  I discussed with the patient the risks of Cantharidin including but not limited to pain, redness, burning, itching, and blistering. Griseofulvin Pregnancy And Lactation Text: This medication is Pregnancy Category X and is known to cause serious birth defects. It is unknown if this medication is excreted in breast milk but breast feeding should be avoided. Olumiant Counseling: I discussed with the patient the risks of Olumiant therapy including but not limited to upper respiratory tract infections, shingles, cold sores, and nausea. Live vaccines should be avoided.  This medication has been linked to serious infections; higher rate of mortality; malignancy and lymphoproliferative disorders; major adverse cardiovascular events; thrombosis; gastrointestinal perforations; neutropenia; lymphopenia; anemia; liver enzyme elevations; and lipid elevations. Niacinamide Pregnancy And Lactation Text: These medications are considered safe during pregnancy. Acitretin Counseling:  I discussed with the patient the risks of acitretin including but not limited to hair loss, dry lips/skin/eyes, liver damage, hyperlipidemia, depression/suicidal ideation, photosensitivity.  Serious rare side effects can include but are not limited to pancreatitis, pseudotumor cerebri, bony changes, clot formation/stroke/heart attack.  Patient understands that alcohol is contraindicated since it can result in liver toxicity and significantly prolong the elimination of the drug by many years. Cosentyx Counseling:  I discussed with the patient the risks of Cosentyx including but not limited to worsening of Crohn's disease, immunosuppression, allergic reactions and infections.  The patient understands that monitoring is required including a PPD at baseline and must alert us or the primary physician if symptoms of infection or other concerning signs are noted. Nemluvio Pregnancy And Lactation Text: It is not known if Nemluvio causes fetal harm or is present in breast milk. Please proceed with caution if patients who are pregnant or breastfeeding. Mirvaso Pregnancy And Lactation Text: This medication has not been assigned a Pregnancy Risk Category. It is unknown if the medication is excreted in breast milk. Tazorac Pregnancy And Lactation Text: This medication is not safe during pregnancy. It is unknown if this medication is excreted in breast milk. Rifampin Pregnancy And Lactation Text: This medication is Pregnancy Category C and it isn't know if it is safe during pregnancy. It is also excreted in breast milk and should not be used if you are breast feeding. Ivermectin Counseling:  Patient instructed to take medication on an empty stomach with a full glass of water.  Patient informed of potential adverse effects including but not limited to nausea, diarrhea, dizziness, itching, and swelling of the extremities or lymph nodes.  The patient verbalized understanding of the proper use and possible adverse effects of ivermectin.  All of the patient's questions and concerns were addressed. Cimetidine Pregnancy And Lactation Text: This medication is Pregnancy Category B and is considered safe during pregnancy. It is also excreted in breast milk and breast feeding isn't recommended. Doxycycline Pregnancy And Lactation Text: This medication is Pregnancy Category D and not consider safe during pregnancy. It is also excreted in breast milk but is considered safe for shorter treatment courses. Aklief counseling:  Patient advised to apply a pea-sized amount only at bedtime and wait 30 minutes after washing their face before applying.  If too drying, patient may add a non-comedogenic moisturizer.  The most commonly reported side effects including irritation, redness, scaling, dryness, stinging, burning, itching, and increased risk of sunburn.  The patient verbalized understanding of the proper use and possible adverse effects of retinoids.  All of the patient's questions and concerns were addressed. Methotrexate Counseling:  Patient counseled regarding adverse effects of methotrexate including but not limited to nausea, vomiting, abnormalities in liver function tests. Patients may develop mouth sores, rash, diarrhea, and abnormalities in blood counts. The patient understands that monitoring is required including LFT's and blood counts.  There is a rare possibility of scarring of the liver and lung problems that can occur when taking methotrexate. Persistent nausea, loss of appetite, pale stools, dark urine, cough, and shortness of breath should be reported immediately. Patient advised to discontinue methotrexate treatment at least three months before attempting to become pregnant.  I discussed the need for folate supplements while taking methotrexate.  These supplements can decrease side effects during methotrexate treatment. The patient verbalized understanding of the proper use and possible adverse effects of methotrexate.  All of the patient's questions and concerns were addressed. 5-Fu Counseling: 5-Fluorouracil Counseling:  I discussed with the patient the risks of 5-fluorouracil including but not limited to erythema, scaling, itching, weeping, crusting, and pain. Tranexamic Acid Counseling:  Patient advised of the small risk of bleeding problems with tranexamic acid. They were also instructed to call if they developed any nausea, vomiting or diarrhea. All of the patient's questions and concerns were addressed. Zoryve Counseling:  I discussed with the patient that Zoryve is not for use in the eyes, mouth or vagina. The most commonly reported side effects include diarrhea, headache, insomnia, application site pain, upper respiratory tract infections, and urinary tract infections.  All of the patient's questions and concerns were addressed. Olumiant Pregnancy And Lactation Text: Based on animal studies, Olumiant may cause embryo-fetal harm when administered to pregnant women.  The medication should not be used in pregnancy.  Breastfeeding is not recommended during treatment. 5-Fu Pregnancy And Lactation Text: This medication is Pregnancy Category X and contraindicated in pregnancy and in women who may become pregnant. It is unknown if this medication is excreted in breast milk. Nsaids Counseling: NSAID Counseling: I discussed with the patient that NSAIDs should be taken with food. Prolonged use of NSAIDs can result in the development of stomach ulcers.  Patient advised to stop taking NSAIDs if abdominal pain occurs.  The patient verbalized understanding of the proper use and possible adverse effects of NSAIDs.  All of the patient's questions and concerns were addressed. Rituxan Counseling:  I discussed with the patient the risks of Rituxan infusions. Side effects can include infusion reactions, severe drug rashes including mucocutaneous reactions, reactivation of latent hepatitis and other infections and rarely progressive multifocal leukoencephalopathy.  All of the patient's questions and concerns were addressed. Topical Clindamycin Counseling: Patient counseled that this medication may cause skin irritation or allergic reactions.  In the event of skin irritation, the patient was advised to reduce the amount of the drug applied or use it less frequently.   The patient verbalized understanding of the proper use and possible adverse effects of clindamycin.  All of the patient's questions and concerns were addressed. Acitretin Pregnancy And Lactation Text: This medication is Pregnancy Category X and should not be given to women who are pregnant or may become pregnant in the future. This medication is excreted in breast milk. Sarecycline Counseling: Patient advised regarding possible photosensitivity and discoloration of the teeth, skin, lips, tongue and gums.  Patient instructed to avoid sunlight, if possible.  When exposed to sunlight, patients should wear protective clothing, sunglasses, and sunscreen.  The patient was instructed to call the office immediately if the following severe adverse effects occur:  hearing changes, easy bruising/bleeding, severe headache, or vision changes.  The patient verbalized understanding of the proper use and possible adverse effects of sarecycline.  All of the patient's questions and concerns were addressed. Opzelura Counseling:  I discussed with the patient the risks of Opzelura including but not limited to nasopharngitis, bronchitis, ear infection, eosinophila, hives, diarrhea, folliculitis, tonsillitis, and rhinorrhea.  Taken orally, this medication has been linked to serious infections; higher rate of mortality; malignancy and lymphoproliferative disorders; major adverse cardiovascular events; thrombosis; thrombocytopenia, anemia, and neutropenia; and lipid elevations. Detail Level: Simple Arava Counseling:  Patient counseled regarding adverse effects of Arava including but not limited to nausea, vomiting, abnormalities in liver function tests. Patients may develop mouth sores, rash, diarrhea, and abnormalities in blood counts. The patient understands that monitoring is required including LFTs and blood counts.  There is a rare possibility of scarring of the liver and lung problems that can occur when taking methotrexate. Persistent nausea, loss of appetite, pale stools, dark urine, cough, and shortness of breath should be reported immediately. Patient advised to discontinue Arava treatment and consult with a physician prior to attempting conception. The patient will have to undergo a treatment to eliminate Arava from the body prior to conception. Erythromycin Counseling:  I discussed with the patient the risks of erythromycin including but not limited to GI upset, allergic reaction, drug rash, diarrhea, increase in liver enzymes, and yeast infections. Tranexamic Acid Pregnancy And Lactation Text: It is unknown if this medication is safe during pregnancy or breast feeding. Doxepin Counseling:  Patient advised that the medication is sedating and not to drive a car after taking this medication. Patient informed of potential adverse effects including but not limited to dry mouth, urinary retention, and blurry vision.  The patient verbalized understanding of the proper use and possible adverse effects of doxepin.  All of the patient's questions and concerns were addressed. Methotrexate Pregnancy And Lactation Text: This medication is Pregnancy Category X and is known to cause fetal harm. This medication is excreted in breast milk. Itraconazole Counseling:  I discussed with the patient the risks of itraconazole including but not limited to liver damage, nausea/vomiting, neuropathy, and severe allergy.  The patient understands that this medication is best absorbed when taken with acidic beverages such as non-diet cola or ginger ale.  The patient understands that monitoring is required including baseline LFTs and repeat LFTs at intervals.  The patient understands that they are to contact us or the primary physician if concerning signs are noted. Propranolol Counseling:  I discussed with the patient the risks of propranolol including but not limited to low heart rate, low blood pressure, low blood sugar, restlessness and increased cold sensitivity. They should call the office if they experience any of these side effects. Wartpeel Counseling:  I discussed with the patient the risks of Wartpeel including but not limited to erythema, scaling, itching, weeping, crusting, and pain. Benzoyl Peroxide Counseling: Patient counseled that medicine may cause skin irritation and bleach clothing.  In the event of skin irritation, the patient was advised to reduce the amount of the drug applied or use it less frequently.   The patient verbalized understanding of the proper use and possible adverse effects of benzoyl peroxide.  All of the patient's questions and concerns were addressed. Finasteride Pregnancy And Lactation Text: This medication is absolutely contraindicated during pregnancy. It is unknown if it is excreted in breast milk. Topical Sulfur Applications Pregnancy And Lactation Text: This medication is Pregnancy Category C and has an unknown safety profile during pregnancy. It is unknown if this topical medication is excreted in breast milk. Bactrim Pregnancy And Lactation Text: This medication is Pregnancy Category D and is known to cause fetal risk.  It is also excreted in breast milk. Taltz Pregnancy And Lactation Text: The risk during pregnancy and breastfeeding is uncertain with this medication. Glycopyrrolate Counseling:  I discussed with the patient the risks of glycopyrrolate including but not limited to skin rash, drowsiness, dry mouth, difficulty urinating, and blurred vision. Cellcept Counseling:  I discussed with the patient the risks of mycophenolate mofetil including but not limited to infection/immunosuppression, GI upset, hypokalemia, hypercholesterolemia, bone marrow suppression, lymphoproliferative disorders, malignancy, GI ulceration/bleed/perforation, colitis, interstitial lung disease, kidney failure, progressive multifocal leukoencephalopathy, and birth defects.  The patient understands that monitoring is required including a baseline creatinine and regular CBC testing. In addition, patient must alert us immediately if symptoms of infection or other concerning signs are noted. Solaraze Counseling:  I discussed with the patient the risks of Solaraze including but not limited to erythema, scaling, itching, weeping, crusting, and pain. Libtayo Pregnancy And Lactation Text: This medication is contraindicated in pregnancy and when breast feeding. Adbry Counseling: I discussed with the patient the risks of tralokinumab including but not limited to eye infection and irritation, cold sores, injection site reactions, worsening of asthma, allergic reactions and increased risk of parasitic infection.  Live vaccines should be avoided while taking tralokinumab. The patient understands that monitoring is required and they must alert us or the primary physician if symptoms of infection or other concerning signs are noted. Adbry Pregnancy And Lactation Text: It is unknown if this medication will adversely affect pregnancy or breast feeding. Propranolol Pregnancy And Lactation Text: This medication is Pregnancy Category C and it isn't known if it is safe during pregnancy. It is excreted in breast milk. Ilumya Counseling: I discussed with the patient the risks of tildrakizumab including but not limited to immunosuppression, malignancy, posterior leukoencephalopathy syndrome, and serious infections.  The patient understands that monitoring is required including a PPD at baseline and must alert us or the primary physician if symptoms of infection or other concerning signs are noted. Benzoyl Peroxide Pregnancy And Lactation Text: This medication is Pregnancy Category C. It is unknown if benzoyl peroxide is excreted in breast milk. Carac Counseling:  I discussed with the patient the risks of Carac including but not limited to erythema, scaling, itching, weeping, crusting, and pain. Cephalexin Counseling: I counseled the patient regarding use of cephalexin as an antibiotic for prophylactic and/or therapeutic purposes. Cephalexin (commonly prescribed under brand name Keflex) is a cephalosporin antibiotic which is active against numerous classes of bacteria, including most skin bacteria. Side effects may include nausea, diarrhea, gastrointestinal upset, rash, hives, yeast infections, and in rare cases, hepatitis, kidney disease, seizures, fever, confusion, neurologic symptoms, and others. Patients with severe allergies to penicillin medications are cautioned that there is about a 10% incidence of cross-reactivity with cephalosporins. When possible, patients with penicillin allergies should use alternatives to cephalosporins for antibiotic therapy. Glycopyrrolate Pregnancy And Lactation Text: This medication is Pregnancy Category B and is considered safe during pregnancy. It is unknown if it is excreted breast milk. Solaraze Pregnancy And Lactation Text: This medication is Pregnancy Category B and is considered safe. There is some data to suggest avoiding during the third trimester. It is unknown if this medication is excreted in breast milk. Tremfya Counseling: I discussed with the patient the risks of guselkumab including but not limited to immunosuppression, serious infections, and drug reactions.  The patient understands that monitoring is required including a PPD at baseline and must alert us or the primary physician if symptoms of infection or other concerning signs are noted. Odomzo Counseling- I discussed with the patient the risks of Odomzo including but not limited to nausea, vomiting, diarrhea, constipation, weight loss, changes in the sense of taste, decreased appetite, muscle spasms, and hair loss.  The patient verbalized understanding of the proper use and possible adverse effects of Odomzo.  All of the patient's questions and concerns were addressed. Cellcept Pregnancy And Lactation Text: This medication is Pregnancy Category D and isn't considered safe during pregnancy. It is unknown if this medication is excreted in breast milk. Klisyri Counseling:  I discussed with the patient the risks of Klisyri including but not limited to erythema, scaling, itching, weeping, crusting, and pain. SSKI Counseling:  I discussed with the patient the risks of SSKI including but not limited to thyroid abnormalities, metallic taste, GI upset, fever, headache, acne, arthralgias, paraesthesias, lymphadenopathy, easy bleeding, arrhythmias, and allergic reaction. Bimzelx Counseling:  I discussed with the patient the risks of Bimzelx including but not limited to depression, immunosuppression, allergic reactions and infections.  The patient understands that monitoring is required including a PPD at baseline and must alert us or the primary physician if symptoms of infection or other concerning signs are noted. Cibinqo Counseling: I discussed with the patient the risks of Cibinqo therapy including but not limited to common cold, nausea, headache, cold sores, increased blood CPK levels, dizziness, UTIs, fatigue, acne, and vomitting. Live vaccines should be avoided.  This medication has been linked to serious infections; higher rate of mortality; malignancy and lymphoproliferative disorders; major adverse cardiovascular events; thrombosis; thrombocytopenia and lymphopenia; lipid elevations; and retinal detachment. Winlevi Counseling:  I discussed with the patient the risks of topical clascoterone including but not limited to erythema, scaling, itching, and stinging. Patient voiced their understanding. Minocycline Pregnancy And Lactation Text: This medication is Pregnancy Category D and not consider safe during pregnancy. It is also excreted in breast milk. Hydroxychloroquine Pregnancy And Lactation Text: This medication has been shown to cause fetal harm but it isn't assigned a Pregnancy Risk Category. There are small amounts excreted in breast milk. Birth Control Pills Counseling: Birth Control Pill Counseling: I discussed with the patient the potential side effects of OCPs including but not limited to increased risk of stroke, heart attack, thrombophlebitis, deep venous thrombosis, hepatic adenomas, breast changes, GI upset, headaches, and depression.  The patient verbalized understanding of the proper use and possible adverse effects of OCPs. All of the patient's questions and concerns were addressed. Cephalexin Pregnancy And Lactation Text: This medication is Pregnancy Category B and considered safe during pregnancy.  It is also excreted in breast milk but can be used safely for shorter doses. Hydroxychloroquine Counseling:  I discussed with the patient that a baseline ophthalmologic exam is needed at the start of therapy and every year thereafter while on therapy. A CBC may also be warranted for monitoring.  The side effects of this medication were discussed with the patient, including but not limited to agranulocytosis, aplastic anemia, seizures, rashes, retinopathy, and liver toxicity. Patient instructed to call the office should any adverse effect occur.  The patient verbalized understanding of the proper use and possible adverse effects of Plaquenil.  All the patient's questions and concerns were addressed. Soolantra Counseling: I discussed with the patients the risks of topial Soolantra. This is a medicine which decreases the number of mites and inflammation in the skin. You experience burning, stinging, eye irritation or allergic reactions.  Please call our office if you develop any problems from using this medication. Zyclara Counseling:  I discussed with the patient the risks of imiquimod including but not limited to erythema, scaling, itching, weeping, crusting, and pain.  Patient understands that the inflammatory response to imiquimod is variable from person to person and was educated regarded proper titration schedule.  If flu-like symptoms develop, patient knows to discontinue the medication and contact us. Cyclophosphamide Counseling:  I discussed with the patient the risks of cyclophosphamide including but not limited to hair loss, hormonal abnormalities, decreased fertility, abdominal pain, diarrhea, nausea and vomiting, bone marrow suppression and infection. The patient understands that monitoring is required while taking this medication. Klisyri Pregnancy And Lactation Text: It is unknown if this medication can harm a developing fetus or if it is excreted in breast milk. Minoxidil Counseling: Minoxidil is a topical medication which can increase blood flow where it is applied. It is uncertain how this medication increases hair growth. Side effects are uncommon and include stinging and allergic reactions. Bimzelx Pregnancy And Lactation Text: This medication crosses the placenta and the safety is uncertain during pregnancy. It is unknown if this medication is present in breast milk. Sski Pregnancy And Lactation Text: This medication is Pregnancy Category D and isn't considered safe during pregnancy. It is excreted in breast milk. Cibinqo Pregnancy And Lactation Text: It is unknown if this medication will adversely affect pregnancy or breast feeding.  You should not take this medication if you are currently pregnant or planning a pregnancy or while breastfeeding. Winlevi Pregnancy And Lactation Text: This medication is considered safe during pregnancy and breastfeeding. Infliximab Counseling:  I discussed with the patient the risks of infliximab including but not limited to myelosuppression, immunosuppression, autoimmune hepatitis, demyelinating diseases, lymphoma, and serious infections.  The patient understands that monitoring is required including a PPD at baseline and must alert us or the primary physician if symptoms of infection or other concerning signs are noted. Quinolones Counseling:  I discussed with the patient the risks of fluoroquinolones including but not limited to GI upset, allergic reaction, drug rash, diarrhea, dizziness, photosensitivity, yeast infections, liver function test abnormalities, tendonitis/tendon rupture. Birth Control Pills Pregnancy And Lactation Text: This medication should be avoided if pregnant and for the first 30 days post-partum. Soolantra Pregnancy And Lactation Text: This medication is Pregnancy Category C. This medication is considered safe during breast feeding. Clindamycin Counseling: I counseled the patient regarding use of clindamycin as an antibiotic for prophylactic and/or therapeutic purposes. Clindamycin is active against numerous classes of bacteria, including skin bacteria. Side effects may include nausea, diarrhea, gastrointestinal upset, rash, hives, yeast infections, and in rare cases, colitis. Xolair Counseling:  Patient informed of potential adverse effects including but not limited to fever, muscle aches, rash and allergic reactions.  The patient verbalized understanding of the proper use and possible adverse effects of Xolair.  All of the patient's questions and concerns were addressed. Calcipotriene Counseling:  I discussed with the patient the risks of calcipotriene including but not limited to erythema, scaling, itching, and irritation. Fluconazole Counseling:  Patient counseled regarding adverse effects of fluconazole including but not limited to headache, diarrhea, nausea, upset stomach, liver function test abnormalities, taste disturbance, and stomach pain.  There is a rare possibility of liver failure that can occur when taking fluconazole.  The patient understands that monitoring of LFTs and kidney function test may be required, especially at baseline. The patient verbalized understanding of the proper use and possible adverse effects of fluconazole.  All of the patient's questions and concerns were addressed. Cyclophosphamide Pregnancy And Lactation Text: This medication is Pregnancy Category D and it isn't considered safe during pregnancy. This medication is excreted in breast milk. Dapsone Counseling: I discussed with the patient the risks of dapsone including but not limited to hemolytic anemia, agranulocytosis, rashes, methemoglobinemia, kidney failure, peripheral neuropathy, headaches, GI upset, and liver toxicity.  Patients who start dapsone require monitoring including baseline LFTs and weekly CBCs for the first month, then every month thereafter.  The patient verbalized understanding of the proper use and possible adverse effects of dapsone.  All of the patient's questions and concerns were addressed. Opioid Counseling: I discussed with the patient the potential side effects of opioids including but not limited to addiction, altered mental status, and depression. I stressed avoiding alcohol, benzodiazepines, muscle relaxants and sleep aids unless specifically okayed by a physician. The patient verbalized understanding of the proper use and possible adverse effects of opioids. All of the patient's questions and concerns were addressed. They were instructed to flush the remaining pills down the toilet if they did not need them for pain. Eucrisa Counseling: Patient may experience a mild burning sensation during topical application. Eucrisa is not approved in children less than 2 years of age. Azithromycin Counseling:  I discussed with the patient the risks of azithromycin including but not limited to GI upset, allergic reaction, drug rash, diarrhea, and yeast infections. Dutasteride Female Counseling: Dutasteride Counseling:  I discussed with the patient the risks of use of dutasteride including but not limited to decreased libido and sexual dysfunction. Explained the teratogenic nature of the medication and stressed the importance of not getting pregnant during treatment. All of the patient's questions and concerns were addressed. Enbrel Counseling:  I discussed with the patient the risks of etanercept including but not limited to myelosuppression, immunosuppression, autoimmune hepatitis, demyelinating diseases, lymphoma, and infections.  The patient understands that monitoring is required including a PPD at baseline and must alert us or the primary physician if symptoms of infection or other concerning signs are noted. Oral Minoxidil Pregnancy And Lactation Text: This medication should only be used when clearly needed if you are pregnant, attempting to become pregnant or breast feeding. Xeljanz Counseling: I discussed with the patient the risks of Xeljanz therapy including increased risk of infection, liver issues, headache, diarrhea, or cold symptoms. Live vaccines should be avoided. They were instructed to call if they have any problems. Topical Metronidazole Pregnancy And Lactation Text: This medication is Pregnancy Category B and considered safe during pregnancy.  It is also considered safe to use while breastfeeding. Qbrexza Counseling:  I discussed with the patient the risks of Qbrexza including but not limited to headache, mydriasis, blurred vision, dry eyes, nasal dryness, dry mouth, dry throat, dry skin, urinary hesitation, and constipation.  Local skin reactions including erythema, burning, stinging, and itching can also occur. High Dose Vitamin A Counseling: Side effects reviewed, pt to contact office should one occur. Spevigo Pregnancy And Lactation Text: The risk during pregnancy and breastfeeding is uncertain with this medication. This medication does cross the placenta. It is unknown if this medication is found in breast milk. Erivedge Counseling- I discussed with the patient the risks of Erivedge including but not limited to nausea, vomiting, diarrhea, constipation, weight loss, changes in the sense of taste, decreased appetite, muscle spasms, and hair loss.  The patient verbalized understanding of the proper use and possible adverse effects of Erivedge.  All of the patient's questions and concerns were addressed. High Dose Vitamin A Pregnancy And Lactation Text: High dose vitamin A therapy is contraindicated during pregnancy and breast feeding. Aklief Pregnancy And Lactation Text: It is unknown if this medication is safe to use during pregnancy.  It is unknown if this medication is excreted in breast milk.  Breastfeeding women should use the topical cream on the smallest area of the skin for the shortest time needed while breastfeeding.  Do not apply to nipple and areola. Opioid Pregnancy And Lactation Text: These medications can lead to premature delivery and should be avoided during pregnancy. These medications are also present in breast milk in small amounts. Dutasteride Pregnancy And Lactation Text: This medication is absolutely contraindicated in women, especially during pregnancy and breast feeding. Feminization of male fetuses is possible if taking while pregnant. Otezla Counseling: The side effects of Otezla were discussed with the patient, including but not limited to worsening or new depression, weight loss, diarrhea, nausea, upper respiratory tract infection, and headache. Patient instructed to call the office should any adverse effect occur.  The patient verbalized understanding of the proper use and possible adverse effects of Otezla.  All the patient's questions and concerns were addressed. Xelgeorgez Pregnancy And Lactation Text: This medication is Pregnancy Category D and is not considered safe during pregnancy.  The risk during breast feeding is also uncertain. Stelara Counseling:  I discussed with the patient the risks of ustekinumab including but not limited to immunosuppression, malignancy, posterior leukoencephalopathy syndrome, and serious infections.  The patient understands that monitoring is required including a PPD at baseline and must alert us or the primary physician if symptoms of infection or other concerning signs are noted. Qbrexza Pregnancy And Lactation Text: There is no available data on Qbrexza use in pregnant women.  There is no available data on Qbrexza use in lactation. Topical Steroids Counseling: I discussed with the patient that prolonged use of topical steroids can result in the increased appearance of superficial blood vessels (telangiectasias), lightening (hypopigmentation) and thinning of the skin (atrophy).  Patient understands to avoid using high potency steroids in skin folds, the groin or the face.  The patient verbalized understanding of the proper use and possible adverse effects of topical steroids.  All of the patient's questions and concerns were addressed. Dapsone Pregnancy And Lactation Text: This medication is Pregnancy Category C and is not considered safe during pregnancy or breast feeding. Simponi Counseling:  I discussed with the patient the risks of golimumab including but not limited to myelosuppression, immunosuppression, autoimmune hepatitis, demyelinating diseases, lymphoma, and serious infections.  The patient understands that monitoring is required including a PPD at baseline and must alert us or the primary physician if symptoms of infection or other concerning signs are noted. Rhofade Counseling: Rhofade is a topical medication which can decrease superficial blood flow where applied. Side effects are uncommon and include stinging, redness and allergic reactions. Azathioprine Counseling:  I discussed with the patient the risks of azathioprine including but not limited to myelosuppression, immunosuppression, hepatotoxicity, lymphoma, and infections.  The patient understands that monitoring is required including baseline LFTs, Creatinine, possible TPMP genotyping and weekly CBCs for the first month and then every 2 weeks thereafter.  The patient verbalized understanding of the proper use and possible adverse effects of azathioprine.  All of the patient's questions and concerns were addressed. Azelaic Acid Counseling: Patient counseled that medicine may cause skin irritation and to avoid applying near the eyes.  In the event of skin irritation, the patient was advised to reduce the amount of the drug applied or use it less frequently.   The patient verbalized understanding of the proper use and possible adverse effects of azelaic acid.  All of the patient's questions and concerns were addressed. Oxybutynin Counseling:  I discussed with the patient the risks of oxybutynin including but not limited to skin rash, drowsiness, dry mouth, difficulty urinating, and blurred vision. Hydroquinone Counseling:  Patient advised that medication may result in skin irritation, lightening (hypopigmentation), dryness, and burning.  In the event of skin irritation, the patient was advised to reduce the amount of the drug applied or use it less frequently.  Rarely, spots that are treated with hydroquinone can become darker (pseudoochronosis).  Should this occur, patient instructed to stop medication and call the office. The patient verbalized understanding of the proper use and possible adverse effects of hydroquinone.  All of the patient's questions and concerns were addressed. Cantharidin Pregnancy And Lactation Text: This medication has not been proven safe during pregnancy. It is unknown if this medication is excreted in breast milk. Finasteride Male Counseling: Finasteride Counseling:  I discussed with the patient the risks of use of finasteride including but not limited to decreased libido, decreased ejaculate volume, gynecomastia, and depression. Women should not handle medication.  All of the patient's questions and concerns were addressed. Topical Sulfur Applications Counseling: Topical Sulfur Counseling: Patient counseled that this medication may cause skin irritation or allergic reactions.  In the event of skin irritation, the patient was advised to reduce the amount of the drug applied or use it less frequently.   The patient verbalized understanding of the proper use and possible adverse effects of topical sulfur application.  All of the patient's questions and concerns were addressed. Azithromycin Pregnancy And Lactation Text: This medication is considered safe during pregnancy and is also secreted in breast milk. Humira Counseling:  I discussed with the patient the risks of adalimumab including but not limited to myelosuppression, immunosuppression, autoimmune hepatitis, demyelinating diseases, lymphoma, and serious infections.  The patient understands that monitoring is required including a PPD at baseline and must alert us or the primary physician if symptoms of infection or other concerning signs are noted. Otezla Pregnancy And Lactation Text: This medication is Pregnancy Category C and it isn't known if it is safe during pregnancy. It is unknown if it is excreted in breast milk. Topical Steroids Applications Pregnancy And Lactation Text: Most topical steroids are considered safe to use during pregnancy and lactation.  Any topical steroid applied to the breast or nipple should be washed off before breastfeeding. Gabapentin Counseling: I discussed with the patient the risks of gabapentin including but not limited to dizziness, somnolence, fatigue and ataxia. Skyrizi Counseling: I discussed with the patient the risks of risankizumab-rzaa including but not limited to immunosuppression, and serious infections.  The patient understands that monitoring is required including a PPD at baseline and must alert us or the primary physician if symptoms of infection or other concerning signs are noted. Libtayo Counseling- I discussed with the patient the risks of Libtayo including but not limited to nausea, vomiting, diarrhea, and bone or muscle pain.  The patient verbalized understanding of the proper use and possible adverse effects of Libtayo.  All of the patient's questions and concerns were addressed. Azelaic Acid Pregnancy And Lactation Text: This medication is considered safe during pregnancy and breast feeding. Hyrimoz Counseling:  I discussed with the patient the risks of adalimumab including but not limited to myelosuppression, immunosuppression, autoimmune hepatitis, demyelinating diseases, lymphoma, and serious infections.  The patient understands that monitoring is required including a PPD at baseline and must alert us or the primary physician if symptoms of infection or other concerning signs are noted. Oxybutynin Pregnancy And Lactation Text: This medication is Pregnancy Category B and is considered safe during pregnancy. It is unknown if it is excreted in breast milk. Finasteride Female Counseling: Finasteride Counseling:  I discussed with the patient the risks of use of finasteride including but not limited to decreased libido and sexual dysfunction. Explained the teratogenic nature of the medication and stressed the importance of not getting pregnant during treatment. All of the patient's questions and concerns were addressed. Imiquimod Counseling:  I discussed with the patient the risks of imiquimod including but not limited to erythema, scaling, itching, weeping, crusting, and pain.  Patient understands that the inflammatory response to imiquimod is variable from person to person and was educated regarded proper titration schedule.  If flu-like symptoms develop, patient knows to discontinue the medication and contact us. Bactrim Counseling:  I discussed with the patient the risks of sulfa antibiotics including but not limited to GI upset, allergic reaction, drug rash, diarrhea, dizziness, photosensitivity, and yeast infections.  Rarely, more serious reactions can occur including but not limited to aplastic anemia, agranulocytosis, methemoglobinemia, blood dyscrasias, liver or kidney failure, lung infiltrates or desquamative/blistering drug rashes. Gabapentin Pregnancy And Lactation Text: This medication is Pregnancy Category C and isn't considered safe during pregnancy. It is excreted in breast milk. Taltz Counseling: I discussed with the patient the risks of ixekizumab including but not limited to immunosuppression, serious infections, worsening of inflammatory bowel disease and drug reactions.  The patient understands that monitoring is required including a PPD at baseline and must alert us or the primary physician if symptoms of infection or other concerning signs are noted. Nsaids Pregnancy And Lactation Text: These medications are considered safe up to 30 weeks gestation. It is excreted in breast milk. Drysol Counseling:  I discussed with the patient the risks of drysol/aluminum chloride including but not limited to skin rash, itching, irritation, burning. Bexarotene Counseling:  I discussed with the patient the risks of bexarotene including but not limited to hair loss, dry lips/skin/eyes, liver abnormalities, hyperlipidemia, pancreatitis, depression/suicidal ideation, photosensitivity, drug rash/allergic reactions, hypothyroidism, anemia, leukopenia, infection, cataracts, and teratogenicity.  Patient understands that they will need regular blood tests to check lipid profile, liver function tests, white blood cell count, thyroid function tests and pregnancy test if applicable. Rituxan Pregnancy And Lactation Text: This medication is Pregnancy Category C and it isn't know if it is safe during pregnancy. It is unknown if this medication is excreted in breast milk but similar antibodies are known to be excreted. Opzelura Pregnancy And Lactation Text: There is insufficient data to evaluate drug-associated risk for major birth defects, miscarriage, or other adverse maternal or fetal outcomes.  There is a pregnancy registry that monitors pregnancy outcomes in pregnant persons exposed to the medication during pregnancy.  It is unknown if this medication is excreted in breast milk.  Do not breastfeed during treatment and for about 4 weeks after the last dose. Clofazimine Counseling:  I discussed with the patient the risks of clofazimine including but not limited to skin and eye pigmentation, liver damage, nausea/vomiting, gastrointestinal bleeding and allergy. Erythromycin Pregnancy And Lactation Text: This medication is Pregnancy Category B and is considered safe during pregnancy. It is also excreted in breast milk. Picato Counseling:  I discussed with the patient the risks of Picato including but not limited to erythema, scaling, itching, weeping, crusting, and pain. Valtrex Counseling: I discussed with the patient the risks of valacyclovir including but not limited to kidney damage, nausea, vomiting and severe allergy.  The patient understands that if the infection seems to be worsening or is not improving, they are to call. Dupixent Counseling: I discussed with the patient the risks of dupilumab including but not limited to eye infection and irritation, cold sores, injection site reactions, worsening of asthma, allergic reactions and increased risk of parasitic infection.  Live vaccines should be avoided while taking dupilumab. Dupilumab will also interact with certain medications such as warfarin and cyclosporine. The patient understands that monitoring is required and they must alert us or the primary physician if symptoms of infection or other concerning signs are noted. Prednisone Counseling:  I discussed with the patient the risks of prolonged use of prednisone including but not limited to weight gain, insomnia, osteoporosis, mood changes, diabetes, susceptibility to infection, glaucoma and high blood pressure.  In cases where prednisone use is prolonged, patients should be monitored with blood pressure checks, serum glucose levels and an eye exam.  Additionally, the patient may need to be placed on GI prophylaxis, PCP prophylaxis, and calcium and vitamin D supplementation and/or a bisphosphonate.  The patient verbalized understanding of the proper use and the possible adverse effects of prednisone.  All of the patient's questions and concerns were addressed. Doxepin Pregnancy And Lactation Text: This medication is Pregnancy Category C and it isn't known if it is safe during pregnancy. It is also excreted in breast milk and breast feeding isn't recommended. Rinvoq Counseling: I discussed with the patient the risks of Rinvoq therapy including but not limited to upper respiratory tract infections, shingles, cold sores, bronchitis, nausea, cough, fever, acne, and headache. Live vaccines should be avoided.  This medication has been linked to serious infections; higher rate of mortality; malignancy and lymphoproliferative disorders; major adverse cardiovascular events; thrombosis; thrombocytopenia, anemia, and neutropenia; lipid elevations; liver enzyme elevations; and gastrointestinal perforations. Bexarotene Pregnancy And Lactation Text: This medication is Pregnancy Category X and should not be given to women who are pregnant or may become pregnant. This medication should not be used if you are breast feeding. Include Pregnancy/Lactation Warning?: No Siliq Counseling:  I discussed with the patient the risks of Siliq including but not limited to new or worsening depression, suicidal thoughts and behavior, immunosuppression, malignancy, posterior leukoencephalopathy syndrome, and serious infections.  The patient understands that monitoring is required including a PPD at baseline and must alert us or the primary physician if symptoms of infection or other concerning signs are noted. There is also a special program designed to monitor depression which is required with Siliq. Topical Ketoconazole Counseling: Patient counseled that this medication may cause skin irritation or allergic reactions.  In the event of skin irritation, the patient was advised to reduce the amount of the drug applied or use it less frequently.   The patient verbalized understanding of the proper use and possible adverse effects of ketoconazole.  All of the patient's questions and concerns were addressed. Tetracycline Counseling: Patient counseled regarding possible photosensitivity and increased risk for sunburn.  Patient instructed to avoid sunlight, if possible.  When exposed to sunlight, patients should wear protective clothing, sunglasses, and sunscreen.  The patient was instructed to call the office immediately if the following severe adverse effects occur:  hearing changes, easy bruising/bleeding, severe headache, or vision changes.  The patient verbalized understanding of the proper use and possible adverse effects of tetracycline.  All of the patient's questions and concerns were addressed. Patient understands to avoid pregnancy while on therapy due to potential birth defects. Metronidazole Counseling:  I discussed with the patient the risks of metronidazole including but not limited to seizures, nausea/vomiting, a metallic taste in the mouth, nausea/vomiting and severe allergy. Valtrex Pregnancy And Lactation Text: this medication is Pregnancy Category B and is considered safe during pregnancy. This medication is not directly found in breast milk but it's metabolite acyclovir is present. Dupixent Pregnancy And Lactation Text: This medication likely crosses the placenta but the risk for the fetus is uncertain. This medication is excreted in breast milk. Hydroxyzine Counseling: Patient advised that the medication is sedating and not to drive a car after taking this medication.  Patient informed of potential adverse effects including but not limited to dry mouth, urinary retention, and blurry vision.  The patient verbalized understanding of the proper use and possible adverse effects of hydroxyzine.  All of the patient's questions and concerns were addressed. Olanzapine Counseling- I discussed with the patient the common side effects of olanzapine including but are not limited to: lack of energy, dry mouth, increased appetite, sleepiness, tremor, constipation, dizziness, changes in behavior, or restlessness.  Explained that teenagers are more likely to experience headaches, abdominal pain, pain in the arms or legs, tiredness, and sleepiness.  Serious side effects include but are not limited: increased risk of death in elderly patients who are confused, have memory loss, or dementia-related psychosis; hyperglycemia; increased cholesterol and triglycerides; and weight gain. Rinvoq Pregnancy And Lactation Text: Based on animal studies, Rinvoq may cause embryo-fetal harm when administered to pregnant women.  The medication should not be used in pregnancy.  Breastfeeding is not recommended during treatment and for 6 days after the last dose. Ketoconazole Counseling:   Patient counseled regarding improving absorption with orange juice.  Adverse effects include but are not limited to breast enlargement, headache, diarrhea, nausea, upset stomach, liver function test abnormalities, taste disturbance, and stomach pain.  There is a rare possibility of liver failure that can occur when taking ketoconazole. The patient understands that monitoring of LFTs may be required, especially at baseline. The patient verbalized understanding of the proper use and possible adverse effects of ketoconazole.  All of the patient's questions and concerns were addressed. Elidel Counseling: Patient may experience a mild burning sensation during topical application. Elidel is not approved in children less than 2 years of age. There have been case reports of hematologic and skin malignancies in patients using topical calcineurin inhibitors although causality is questionable. Isotretinoin Counseling: Patient should get monthly blood tests, not donate blood, not drive at night if vision affected, not share medication, and not undergo elective surgery for 6 months after tx completed. Side effects reviewed, pt to contact office should one occur. Colchicine Counseling:  Patient counseled regarding adverse effects including but not limited to stomach upset (nausea, vomiting, stomach pain, or diarrhea).  Patient instructed to limit alcohol consumption while taking this medication.  Colchicine may reduce blood counts especially with prolonged use.  The patient understands that monitoring of kidney function and blood counts may be required, especially at baseline. The patient verbalized understanding of the proper use and possible adverse effects of colchicine.  All of the patient's questions and concerns were addressed. Ebglyss Counseling: I discussed with the patient the risks of lebrikizumab including but not limited to eye inflammation and irritation, cold sores, injection site reactions, allergic reactions and increased risk of parasitic infection. The patient understands that monitoring is required and they must alert us or the primary physician if symptoms of infection or other concerning signs are noted. Protopic Counseling: Patient may experience a mild burning sensation during topical application. Protopic is not approved in children less than 2 years of age. There have been case reports of hematologic and skin malignancies in patients using topical calcineurin inhibitors although causality is questionable. Metronidazole Pregnancy And Lactation Text: This medication is Pregnancy Category B and considered safe during pregnancy.  It is also excreted in breast milk. Sotyktu Counseling:  I discussed the most common side effects of Sotyktu including: common cold, sore throat, sinus infections, cold sores, canker sores, folliculitis, and acne.? I also discussed more serious side effects of Sotyktu including but not limited to: serious allergic reactions; increased risk for infections such as TB; cancers such as lymphomas; rhabdomyolysis and elevated CPK; and elevated triglycerides and liver enzymes.? Olanzapine Pregnancy And Lactation Text: This medication is pregnancy category C.   There are no adequate and well controlled trials with olanzapine in pregnant females.  Olanzapine should be used during pregnancy only if the potential benefit justifies the potential risk to the fetus.   In a study in lactating healthy women, olanzapine was excreted in breast milk.  It is recommended that women taking olanzapine should not breast feed. Hydroxyzine Pregnancy And Lactation Text: This medication is not safe during pregnancy and should not be taken. It is also excreted in breast milk and breast feeding isn't recommended. Ketoconazole Pregnancy And Lactation Text: This medication is Pregnancy Category C and it isn't know if it is safe during pregnancy. It is also excreted in breast milk and breast feeding isn't recommended. Terbinafine Counseling: Patient counseling regarding adverse effects of terbinafine including but not limited to headache, diarrhea, rash, upset stomach, liver function test abnormalities, itching, taste/smell disturbance, nausea, abdominal pain, and flatulence.  There is a rare possibility of liver failure that can occur when taking terbinafine.  The patient understands that a baseline LFT and kidney function test may be required. The patient verbalized understanding of the proper use and possible adverse effects of terbinafine.  All of the patient's questions and concerns were addressed. Simlandi Counseling:  I discussed with the patient the risks of adalimumab including but not limited to myelosuppression, immunosuppression, autoimmune hepatitis, demyelinating diseases, lymphoma, and serious infections.  The patient understands that monitoring is required including a PPD at baseline and must alert us or the primary physician if symptoms of infection or other concerning signs are noted. Dutasteride Male Counseling: Dustasteride Counseling:  I discussed with the patient the risks of use of dutasteride including but not limited to decreased libido, decreased ejaculate volume, and gynecomastia. Women who can become pregnant should not handle medication.  All of the patient's questions and concerns were addressed. Ebglyss Pregnancy And Lactation Text: This medication likely crosses the placenta but the risk for the fetus is uncertain. It is unknown if this medication is excreted in breast milk. Protopic Pregnancy And Lactation Text: This medication is Pregnancy Category C. It is unknown if this medication is excreted in breast milk when applied topically. Minocycline Counseling: Patient advised regarding possible photosensitivity and discoloration of the teeth, skin, lips, tongue and gums.  Patient instructed to avoid sunlight, if possible.  When exposed to sunlight, patients should wear protective clothing, sunglasses, and sunscreen.  The patient was instructed to call the office immediately if the following severe adverse effects occur:  hearing changes, easy bruising/bleeding, severe headache, or vision changes.  The patient verbalized understanding of the proper use and possible adverse effects of minocycline.  All of the patient's questions and concerns were addressed. Oral Minoxidil Counseling- I discussed with the patient the risks of oral minoxidil including but not limited to shortness of breath, swelling of the feet or ankles, dizziness, lightheadedness, unwanted hair growth and allergic reaction.  The patient verbalized understanding of the proper use and possible adverse effects of oral minoxidil.  All of the patient's questions and concerns were addressed. Sotyktu Pregnancy And Lactation Text: There is insufficient data to evaluate whether or not Sotyktu is safe to use during pregnancy.? ?It is not known if Sotyktu passes into breast milk and whether or not it is safe to use when breastfeeding.?? Isotretinoin Pregnancy And Lactation Text: This medication is Pregnancy Category X and is considered extremely dangerous during pregnancy. It is unknown if it is excreted in breast milk. Spevigo Counseling: I discussed with the patient the risks of Spevigo including but not limited to fatigue, nasuea, vomiting, headache, pruritus, urinary tract infection, an infusion related reactions.  The patient understands that monitoring is required including screening for tuberculosis at baseline and yearly screening thereafter while continuing Spevigo therapy. They should contact us if symptoms of infection or other concerning signs are noted. Topical Metronidazole Counseling: Metronidazole is a topical antibiotic medication. You may experience burning, stinging, redness, or allergic reactions.  Please call our office if you develop any problems from using this medication.

## 2024-11-13 ENCOUNTER — OFFICE VISIT (OUTPATIENT)
Dept: NEUROSURGERY | Facility: CLINIC | Age: 59
End: 2024-11-13
Attending: STUDENT IN AN ORGANIZED HEALTH CARE EDUCATION/TRAINING PROGRAM
Payer: COMMERCIAL

## 2024-11-13 DIAGNOSIS — R29.898 WEAKNESS OF EXTREMITY: Primary | ICD-10-CM

## 2024-11-13 PROCEDURE — 99024 POSTOP FOLLOW-UP VISIT: CPT | Mod: S$GLB,,, | Performed by: STUDENT IN AN ORGANIZED HEALTH CARE EDUCATION/TRAINING PROGRAM

## 2024-11-13 PROCEDURE — 3066F NEPHROPATHY DOC TX: CPT | Mod: CPTII,S$GLB,, | Performed by: STUDENT IN AN ORGANIZED HEALTH CARE EDUCATION/TRAINING PROGRAM

## 2024-11-13 PROCEDURE — 3044F HG A1C LEVEL LT 7.0%: CPT | Mod: CPTII,S$GLB,, | Performed by: STUDENT IN AN ORGANIZED HEALTH CARE EDUCATION/TRAINING PROGRAM

## 2024-11-13 NOTE — PROGRESS NOTES
Subjective:      Patient ID: Catalino Mcfadden is a 59 y.o. male.    Chief Complaint: Follow-up (Last seen primary was 2 months )    Catalino is a 59 y.o. male who presents to the clinic for evaluation and treatment of high risk feet. Catalino has a past medical history of Cancer, Diabetes mellitus, type 2, Diabetic foot ulcer associated with diabetes mellitus due to underlying condition (07/16/2020), ESRD on hemodialysis, Hyperlipidemia, Hypertension, and Obese. Reports new onset left heel blister x several days after a sock rubbed on the left heel. Seen in ED on 11/7/22.     11/16/2022 returns to clinic for follow up of left heel wound.  Seen by my colleague last week, cultures obtained (NGTD) and patient placed on PO abx.  He has kept dressing intact.     11/23/22: F/u left heel ulceration. Presents with calamine coflex left foot.     11/30/22: F/u left heel ulceration. Patient reports going to work this week.     12/14/22: F/u left heel ulceration. Presents in calamine coflex wrap.     12/21/22: F/u left heel ulceration. Presents in calamine coflex wrap.     12/27/22: F/u left heel ulceration. Presents in calamine coflex wrap. No new pedal complaints, continues to work     01/04/23: F/u left heel ulceration. Presents in calamine coflex wrap. No new pedal complaints, continues to work.  Continues to have hiccups      01/11/23: F/u left heel ulceration. Presents in calamine coflex wrap which appears to have padding which shifted medially. No new pedal complaints, continues to work.  Continues to have hiccups    01/18/23: F/u left heel ulceration. Presents in intact calamine coflex wrap. No new pedal complaints.    2/8/23: F/u right foot ulceration. Presents in calamine coflex wrap.     02/15/23: F/u right ulceration. Presents in intact calamine coflex wrap. No new pedal complaints.    3/15/23: F/u right foot ulceration. Presents in calamine coflex wrap.     03/22/23: F/u right ulceration. Presents in intact calamine coflex  wrap. No new pedal complaints.    04/12/23: Returns to clinic for foot inspection following achieving status of healed to bilateral foot wounds.  He has been attempting to care for feet as instructed. 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 Present in NB tennis shoes.  States he became a vegan on 04/04/2023. No new pedal complaints.    06/21/2023 patient returns to clinic for evaluation and treatment of high-risk feet.  He is status post achieving the status of healed to wounds of both feet.  He has been attempting to care the feet as instructed.  He continues to work and attempts to protect the feet in supportive shoes to the best of his ability.  Patient continues to suffer from hiccups.  No new pedal complaints.    07/19/2023 patient returns to clinic for evaluation and treatment of high-risk feet.   He has been attempting to care the feet as instructed. No longer using offloading pads He continues to work and attempts to protect the feet in supportive shoes to the best of his ability.  Patient continues to suffer from hiccups.  No new pedal complaints.    08/23/2023 patient returns to clinic for evaluation treatment of high-risk feet.  He continues to care for feet as instructed.  Recently acquired a new pair of tennis shoes, Denzel 1.  Continues to work for relates he may be looking for new employment in the coming weeks.  Patient relates a new symptom which is loss of balance or feeling like he is losing his equilibrium with walking and with rising.  He denies falls.  He denies nausea, vomiting, fever, chills.  He continues to have hiccups.    11/15/2023 patient returns to clinic for evaluation treatment of high-risk feet.  He continues to care for feet as instructed.   Patient relates that he has become virtually sedentary.  He no longer is employed.  He is living with a friend in [place].  He continues to feel a loss of balance and now also feels weakness to the  lower extremities.  He denies falls.  He denies nausea, vomiting, fever, chills.  He continues to have hiccups.      02/28/2024 patient returns to clinic for evaluation treatment of high-risk feet.  He continues to care for feet as instructed.  Since our last encounter, patient relates that he has now become houseless with most of his belongings being in storage and currently residing in a hotel temporarily.  Patient has plans to return to his hometown of [place] when able.  States that because many of his belongings are in storage he has been wearing the flat nonsupportive tennis shoe that he presents in today.  He states that when he had his feet checked and dialysis they noted increased callus formation to his left lateral foot which was an area previous chronic ulceration and osteomyelitis.  He denies seeing any drainage.  He denies pain secondary to neuropathy.  He has yet to find employment due to consistently failing physical exams.  He continues to feel a loss of balance and now also feels weakness to the lower extremities.  He denies falls.  He continues to have hiccups.    06/04/2024 patient returns to clinic for evaluation treatment of high-risk feet.  Patient relates that he now resides in a small room in [place].   States that because many of his belongings are in storage he continues to wear flat nonsupportive tennis shoes.  He denies pain secondary to neuropathy.  He continues to feel a loss of balance and now also feels weakness to the lower extremities.  He denies falls.  He continues to have hiccups.      06/27/2024 patient returns to clinic for evaluation treatment of high-risk feet.  He has been attempting to care for pre ulcerative lesions as instructed and relates subjective improvement. He denies pain secondary to neuropathy.  No new pedal complaints    08/01/2024 patient returns to clinic for evaluation treatment of high-risk feet.  He has been attempting to care for pre ulcerative lesions  as instructed and relates subjective improvement. He denies pain secondary to neuropathy.  No new pedal complaints. Present in [employer] boat shoes.    11/06/2024 patient returns to clinic for evaluation treatment of high-risk feet.  He has been attempting to care for pre ulcerative lesions as instructed.He denies pain secondary to neuropathy.  No new pedal complaints.    Chief Complaint   Patient presents with    Follow-up     Last seen primary was 2 months          Hemoglobin A1C   Date Value Ref Range Status   05/06/2024 4.1 4.0 - 5.6 % Final     Comment:     ADA Screening Guidelines:  5.7-6.4%  Consistent with prediabetes  >or=6.5%  Consistent with diabetes    High levels of fetal hemoglobin interfere with the HbA1C  assay. Heterozygous hemoglobin variants (HbS, HgC, etc)do  not significantly interfere with this assay.   However, presence of multiple variants may affect accuracy.     02/22/2023 5.1 4.0 - 5.6 % Final     Comment:     ADA Screening Guidelines:  5.7-6.4%  Consistent with prediabetes  >or=6.5%  Consistent with diabetes    High levels of fetal hemoglobin interfere with the HbA1C  assay. Heterozygous hemoglobin variants (HbS, HgC, etc)do  not significantly interfere with this assay.   However, presence of multiple variants may affect accuracy.     02/23/2022 5.0 4.0 - 5.6 % Final     Comment:     ADA Screening Guidelines:  5.7-6.4%  Consistent with prediabetes  >or=6.5%  Consistent with diabetes    High levels of fetal hemoglobin interfere with the HbA1C  assay. Heterozygous hemoglobin variants (HbS, HgC, etc)do  not significantly interfere with this assay.   However, presence of multiple variants may affect accuracy.         Review of Systems   Constitutional: Positive for weight loss. Negative for chills.   Cardiovascular:  Negative for chest pain and claudication.   Respiratory:  Negative for cough.    Skin:  Positive for color change, dry skin and nail changes.   Musculoskeletal:  Positive for joint  pain, muscle weakness and stiffness.   Gastrointestinal:  Negative for nausea.   Neurological:  Positive for disturbances in coordination, loss of balance, numbness, paresthesias and weakness.   Psychiatric/Behavioral:  The patient is not nervous/anxious.            Objective:      There were no vitals filed for this visit.      Physical Exam  Vitals and nursing note reviewed.   Constitutional:       General: He is not in acute distress.     Appearance: He is not toxic-appearing or diaphoretic.      Comments: Pt. is well-developed, well-nourished, appears stated age, in no acute distress, alert and oriented x 3. No evidence of depression, anxiety, or agitation. Calm, cooperative, and communicative. Appropriate interactions and affect.   Cardiovascular:      Pulses:           Dorsalis pedis pulses are 2+ on the right side and 2+ on the left side.        Posterior tibial pulses are 1+ on the right side and 1+ on the left side.      Comments: There is decreased digital hair. Skin is atrophic, slightly hyperpigmented  Pulmonary:      Effort: No respiratory distress.   Musculoskeletal:      Right lower leg: Edema present.      Left lower leg: Edema present.      Right ankle: Swelling present. No tenderness. No lateral malleolus, medial malleolus, AITF ligament, CF ligament or posterior TF ligament tenderness.      Right Achilles Tendon: No defects. Hilliard's test negative.      Left ankle: Swelling present. No tenderness. No lateral malleolus, medial malleolus, AITF ligament, CF ligament or posterior TF ligament tenderness.      Left Achilles Tendon: No defects. Hilliard's test negative.      Right foot: No tenderness or bony tenderness.      Left foot: No tenderness or bony tenderness.      Comments: Decreased stride, station of gait.  apropulsive toe off.  Increased angle and base of gait.    There is equinus deformity bilateral with decreased dorsiflexion at the ankle joint bilateral. No tenderness with compression  of heel. Negative tinels sign. Gait analysis reveals excessive pronation through midstance and propulsion with early heel off.     Patient has hammertoes of digits 2-5 bilateral partially reducible     Decreased first MPJ range of motion both weightbearing and nonweightbearing, no crepitus observed the first MP joint, + dorsal flag sign.     Visible and palpable bunion without pain at dorsomedial 1st metatarsal head right and left.  Hallux abducted right and left partially reducible, tracks laterally without being track bound.  No ecchymosis, erythema, edema, or cardinal signs infection or signs of trauma same foot.    Fat pad atrophy to heels and met heads bilateral    Plantarflexed 1st ray RLE   Lymphadenopathy:      Comments: No lymphatic streaking    Negative lymphadenopathy bilateral popliteal fossa and tarsal tunnel.     Skin:     General: Skin is warm and dry.      Coloration: Skin is not pale.      Findings: Lesion (see wound descriptions below) present. No ecchymosis, laceration or rash.      Nails: There is no clubbing.      Comments: Toenails 1-5 bilaterally discolored/yellowed, dystrophic, brittle with subungual debris.   Superficial abrasion left dorsal lateral foot.    Focal hyperkeratotic lesion consisting entirely of hyperkeratotic tissue without open skin, drainage, pus, fluctuance, malodor, or signs of infection:  left plantar foot.       Neurological:      Sensory: Sensory deficit present.      Comments:  Artemus-Day[place] 5.07 monofilamant testing is diminished Kp feet. Decreased/absent vibratory sensation bilateral feet to 128Hz tuning fork.     Paresthesias, and hyperesthesia bilateral feet with no clearly identified trigger or source.           Psychiatric:         Attention and Perception: He is attentive.         Mood and Affect: Mood is depressed. Mood is not anxious. Affect is not inappropriate.         Speech: He is communicative. Speech is not slurred.         Behavior: Behavior is not  combative.       11/06/2024    Images did not load    08/01/2024 06/27/2024 06/04/2024 02/28/2024    Pre ulcerative callus to left lateral midfoot      Previous site of left medial heel wound        11/15/2023                      08/23/2023     Left foot                Right foot              07/19/2023 06/21/2023 04/12/23:                    Assessment:       Encounter Diagnoses   Name Primary?    Type 2 diabetes mellitus with stage 5 chronic kidney disease Yes    Pre-ulcerative calluses     History of diabetic ulcer of foot     Hallux limitus, acquired, right     Hallux limitus, acquired, left     Plantar fat pad atrophy                          Plan:       Catalino was seen today for follow-up.    Diagnoses and all orders for this visit:    Type 2 diabetes mellitus with stage 5 chronic kidney disease    Pre-ulcerative calluses    History of diabetic ulcer of foot    Hallux limitus, acquired, right    Hallux limitus, acquired, left    Plantar fat pad atrophy            I counseled the patient on his conditions, their implications and medical management.    Education about the prevention of limb loss.    Discussed wound healing cycle, skin integrity, ways to care for skin.Counseled patient on the effects of biomechanical pressure on healing. He verbalizes understanding that it can increase the chances of delayed healing and this prolonged exposure leads to infection or progression of infection which subsequently can result in loss of limb.    Adequate vitamin supplementation, protein intake, and hydration - discussed with patient    All wounds have remained healed, pre ulcerative sites improved.  Skin is still delicate therefore patient must be diligent in avoiding excessive pressure and making sure there is adequate support and padding in shoe gear.     Follow-up: 7-10  weeks but [facility] if any signs of  infection, such as fever, chills, sweats, increased redness or pain.    Long-term goals include keeping the wound healed by good offloading and medical management under the direction of internist.    Shoe inspection. Diabetic Foot Education. Patient reminded of the importance of good nutrition and blood sugar control to help prevent podiatric complications of diabetes. Patient instructed on proper foot hygeine. We discussed wearing proper shoe gear, daily foot inspections, never walking without protective shoe gear, never putting sharp instruments to feet.

## 2024-11-13 NOTE — PROGRESS NOTES
[facility]  Neurosurgery    SUBJECTIVE:     Wound check appointment 11/13/2024:  Patient is now 9 days postop from a left quadriceps muscle biopsy.  His dressing was saturated and he is about to leave town to go to [place] [facility], so he contacted the office to get this looked at.  Denies any significant pain fever or other problems today.    History of Present Illness:  Catalino Mcfadden is a 59 y.o. male past medical history significant for prior C5 through C7 ACDF who presents with a chronic worsening of weakness.  He is being worked up for inclusion body myositis by Dr. Brandi Kaye, who has requested a quadriceps muscle biopsy.    Patient states that his weakness has been going on for years and years.  He has had some weight loss and has been worked up for cancer with a recent CT chest abdomen pelvis.  This did not show anything.  He has had bone biopsy, lymph node biopsy, bronchoscopy without any malignancy identified.  He is still pending a bone marrow biopsy, but had some follow up issues with oncology related to this.    Patient states his weight loss has halted and he has actually started to put some weight back on.  However, he has a hard time getting up from a chair.  He has a hard time walking.  He has right upper extremity pins and needles as if his arm is asleep.  He also states that he does have poor balance.    (Not in a hospital admission)      Review of patient's allergies indicates:  No Known Allergies    Past Medical History:   Diagnosis Date    Cancer     Diabetes mellitus, type 2     Diabetic foot ulcer associated with diabetes mellitus due to underlying condition 07/16/2020    ESRD on hemodialysis     Hyperlipidemia     Hypertension     Obese      Past Surgical History:   Procedure Laterality Date    AV FISTULA PLACEMENT Left 07/06/2023    Procedure: CREATION, AV FISTULA;  Surgeon: Daniel Poon MD;  Location: [facility];  Service: Vascular;  Laterality: Left;  RN PREOP  6/28/2023  LABS DAY OF SURGERY    BONE BIOPSY Left 07/17/2020    Procedure: BIOPSY, BONE;  Surgeon: Verona Whitmore DPM;  Location: [facility] OR;  Service: Podiatry;  Laterality: Left;    CERVICAL DISC SURGERY  07/11/2016    COLONOSCOPY N/A 3/25/2024    Procedure: COLONOSCOPY;  Surgeon: Roopa Pérez MD;  Location: [facility] (4TH FLR);  Service: Endoscopy;  Laterality: N/A;  Ref By: Dr. Pérez   constipation prep  Diaylsis Patient Lab has been ordered  3/20-precall complete-MS    COLONOSCOPY N/A 4/8/2024    Procedure: COLONOSCOPY;  Surgeon: Roopa Pérez MD;  Location: [facility] (2ND FLR);  Service: Endoscopy;  Laterality: N/A;  Prep instructions sent via portal/given to pt in clinic  pt had to be r/s at  request  Dialysis Tues,Thurs,Sat-dw  Peg Prep-dw  4/2/24- Labs - dialysis /poor prep on 3/26 - PEG x 2 - extended prep /LVM for precall - ERW  4/5-LVM for precall-KPvt    COLONOSCOPY N/A 4/16/2024    Procedure: COLONOSCOPY;  Surgeon: Jarett Hill MD;  Location: [facility] (2ND FLR);  Service: Endoscopy;  Laterality: N/A;    DEBRIDEMENT Left 07/17/2020    Procedure: DEBRIDEMENT;  Surgeon: Verona Whitmore DPM;  Location: [facility] OR;  Service: Podiatry;  Laterality: Left;    DEBRIDEMENT OF FOOT Right     toes & plantar surface    ENDOBRONCHIAL ULTRASOUND N/A 3/1/2024    Procedure: ENDOBRONCHIAL ULTRASOUND (EBUS);  Surgeon: Francisco Fleming MD;  Location: [facility] 2ND FLR;  Service: Pulmonary;  Laterality: N/A;    ENDOSCOPIC MUCOSAL RESECTION OF COLON N/A 9/9/2024    Procedure: RESECTION, MUCOSA, COLON, ENDOSCOPIC;  Surgeon: Vikas uQinonez MD;  Location: [facility] (2ND FLR);  Service: Endoscopy;  Laterality: N/A;  6/11 portal-peg-dialysis T Th Sat-+K scheduled-.colonoscopy in 3 months with [facility] doc, [facility], removal of TI polyp and assess prior EMR sites. Cristiano-tt  7/19/24: rescheduled instructions sent via portal-GD  9/3-lvm for pre call-tb  9/4-pre call complete-    ESOPHAGEAL MANOMETRY WITH MEASUREMENT  OF IMPEDANCE N/A 03/27/2023    Procedure: MANOMETRY, ESOPHAGUS, WITH IMPEDANCE MEASUREMENT;  Surgeon: Roopa Pérez MD;  [facility]  Service: Endoscopy;  Laterality: N/A;  Belching and rumination protocol please  instructions via portal - sm    ESOPHAGOGASTRODUODENOSCOPY N/A 12/16/2022    Procedure: EGD (ESOPHAGOGASTRODUODENOSCOPY);  Surgeon: Eren Francois MD;  Location: [facility];  Service: Endoscopy;  Laterality: N/A;  Pt. on Dialysis. K+ ordered prior to procedure.EC    ESOPHAGOGASTRODUODENOSCOPY N/A 12/5/2023    Procedure: EGD (ESOPHAGOGASTRODUODENOSCOPY);  Surgeon: Kash Johnston MD;  Location: [facility];  Service: Endoscopy;  Laterality: N/A;    FRACTURE SURGERY Right     medial ankle, metal plate present    INJECTION OF ANESTHETIC AGENT AROUND NERVE Right 2/2/2024    Procedure: BLOCK, NERVE STELLATE GANGLION *HOLD ASPIRIN 5 DAYS PRIOR*;  Surgeon: Gokul Gonzalez MD;  Location: [facility] [place];  Service: Pain Management;  Laterality: Right;  [phone]    INSERTION OF TUNNELED CENTRAL VENOUS CATHETER (CVC) WITH SUBCUTANEOUS PORT N/A 06/11/2021    Procedure: TUNNEL CATH INSERTION WITHOUT PORT;  Surgeon: Jose Manuel Diagnostic Provider;  Location: [facility];  Service: Radiology;  Laterality: N/A;  11AM START---PHONE PREOP 6/10/21---COVID POSTIVE ON 7/2020---NO S/S    left leg orthopedic surgery Left     MUSCLE BIOPSY Left 11/4/2024    Procedure: BIOPSY, MUSCLE;  Surgeon: Adithya Lewis MD;  Location: [facility];  Service: Neurosurgery;  Laterality: Left;  Left quadriceps muscle biopsy, will need standard pathology protocol for muscle sample. MAC anesthetic with local anesthetic injection    RN PREOP 10/28/2024    REVISION OF ARTERIOVENOUS FISTULA Left 3/14/2024    Procedure: REVISION, AV FISTULA;  Surgeon: Daniel Poon MD;  Location: [facility];  Service: Vascular;  Laterality: Left;  RN preop 3/6/2024----NEED ORDERS    UPPER GASTROINTESTINAL ENDOSCOPY       Family History    Adopted: Yes   Problem Relation Name Age of Onset    Heart disease Father      Colon cancer Neg Hx      Esophageal cancer Neg Hx      Colon polyps Neg Hx      Stomach cancer Neg Hx       Social History     Tobacco Use    Smoking status: Never    Smokeless tobacco: Never   Substance Use Topics    Alcohol use: Not Currently     Comment: occ rare beer    Drug use: No        Review of Systems:  As noted in HPI    OBJECTIVE:     Vital Signs (Most Recent):  Temperature was 99.3°    Physical Exam:  General: well developed, well nourished, no distress.  Left thigh incision is clean dry and intact and appears to be healing very nicely.  No edema/erythema/exudate.  No drainage.   Head: normocephalic, atraumatic  Neurologic: Alert and oriented. Thought content appropriate  Speech: Fluent  Cranial nerves: face symmetric   Eyes: pupils equal  Pulmonary: normal respirations  Sensory: intact to light touch throughout  Motor Strength: Moves all extremities spontaneously     DTR's - 1+ patellar on right, 2+ patellar on left  Musa: absent  Clonus: absent    Gait:  With cane, labored.       ASSESSMENT/PLAN:     Catalino Mcfadden is a 59 y.o. male 9 days postop from left quadriceps muscle biopsy  -incision looks great  -I cleaned the wound with rubbing alcohol and placed a new bandage  -I asked the patient to leave this on for another 48 hours and then take it off for good after that  -counseled patient to clean the incision daily with soap and water, not to immerse under water for 6 weeks postop  -patient should follow up with Dr. Kaye of Neurology for pathology results once these are back        [facility]  Department of Neurosurgery  [phone]    Disclaimer: This note was dictated by speech recognition. Minor errors in transcription may be present.  Please call with any questions.

## 2024-12-06 ENCOUNTER — TELEPHONE (OUTPATIENT)
Dept: HEMATOLOGY/ONCOLOGY | Facility: CLINIC | Age: 59
End: 2024-12-06
Payer: COMMERCIAL

## 2024-12-20 NOTE — PLAN OF CARE
Goals to be met by: 1/9/24     Patient will increase functional independence with ADLs by performing:    Feeding with Modified [place].  UE Dressing with Modified Doug[place].  LE Dressing with Modified Doug[place].  Grooming while standing at sink with Modified Doug[place].  Toileting from raised toilet with Modified Dougherty for hygiene and clothing management.   Bathing from  shower chair/bench with Modified Doug[place].  Toilet transfer to raised toilet with Modified Dougherty.  Increased strength, coordination, and functional activity tolerance for ADL's/IADL's..     b/l LE PAIN AND EDEMA/edema

## 2025-01-03 ENCOUNTER — TELEPHONE (OUTPATIENT)
Dept: HEMATOLOGY/ONCOLOGY | Facility: CLINIC | Age: 60
End: 2025-01-03

## 2025-01-03 NOTE — TELEPHONE ENCOUNTER
----- Message from Lynn sent at 1/3/2025 10:32 AM CST -----  Regarding: r/s appt  Contact: Pt @ [phone]  Pt is calling to speak to someone in the office to r/s his  appt that he is currently scheduled for; no available appts in Epic. Please call to advise. Thanks.

## 2025-01-03 NOTE — TELEPHONE ENCOUNTER
Called patient back and he answered. Helped him reschedule his follow up. Date and time confirmed with him.

## 2025-01-08 ENCOUNTER — HOSPITAL ENCOUNTER (INPATIENT)
Facility: HOSPITAL | Age: 60
LOS: 1 days | Discharge: HOME | DRG: 682 | End: 2025-01-10
Attending: EMERGENCY MEDICINE | Admitting: STUDENT IN AN ORGANIZED HEALTH CARE EDUCATION/TRAINING PROGRAM
Payer: COMMERCIAL

## 2025-01-08 DIAGNOSIS — N18.5 TYPE 2 DIABETES MELLITUS WITH STAGE 5 CHRONIC KIDNEY DISEASE: ICD-10-CM

## 2025-01-08 DIAGNOSIS — R79.89 ELEVATED BRAIN NATRIURETIC PEPTIDE (BNP) LEVEL: ICD-10-CM

## 2025-01-08 DIAGNOSIS — I16.1 HYPERTENSIVE EMERGENCY: ICD-10-CM

## 2025-01-08 DIAGNOSIS — Z99.2 ESRD ON HEMODIALYSIS: Primary | ICD-10-CM

## 2025-01-08 DIAGNOSIS — I10 PRIMARY HYPERTENSION: ICD-10-CM

## 2025-01-08 DIAGNOSIS — E87.5 HYPERKALEMIA: ICD-10-CM

## 2025-01-08 DIAGNOSIS — R07.9 CHEST PAIN: ICD-10-CM

## 2025-01-08 DIAGNOSIS — N18.6 ESRD ON HEMODIALYSIS: Primary | ICD-10-CM

## 2025-01-08 DIAGNOSIS — E11.22 TYPE 2 DIABETES MELLITUS WITH STAGE 5 CHRONIC KIDNEY DISEASE: ICD-10-CM

## 2025-01-08 DIAGNOSIS — E87.1 HYPONATREMIA: ICD-10-CM

## 2025-01-08 DIAGNOSIS — R53.1 WEAKNESS: ICD-10-CM

## 2025-01-08 DIAGNOSIS — I15.0 MALIGNANT RENOVASCULAR HYPERTENSION: ICD-10-CM

## 2025-01-08 LAB
ALBUMIN SERPL BCP-MCNC: 3.1 G/DL (ref 3.5–5.2)
ALP SERPL-CCNC: 622 U/L (ref 40–150)
ALT SERPL W/O P-5'-P-CCNC: 42 U/L (ref 10–44)
ANION GAP SERPL CALC-SCNC: 16 MMOL/L (ref 8–16)
AST SERPL-CCNC: 55 U/L (ref 10–40)
BASOPHILS # BLD AUTO: 0.03 K/UL (ref 0–0.2)
BASOPHILS NFR BLD: 0.6 % (ref 0–1.9)
BILIRUB SERPL-MCNC: 2.2 MG/DL (ref 0.1–1)
BNP SERPL-MCNC: 2358 PG/ML (ref 0–99)
BUN SERPL-MCNC: 80 MG/DL (ref 6–20)
BUN SERPL-MCNC: 97 MG/DL (ref 6–30)
CALCIUM SERPL-MCNC: 9.5 MG/DL (ref 8.7–10.5)
CHLORIDE SERPL-SCNC: 94 MMOL/L (ref 95–110)
CHLORIDE SERPL-SCNC: 97 MMOL/L (ref 95–110)
CO2 SERPL-SCNC: 17 MMOL/L (ref 23–29)
CREAT SERPL-MCNC: 8 MG/DL (ref 0.5–1.4)
CREAT SERPL-MCNC: 9.4 MG/DL (ref 0.5–1.4)
DIFFERENTIAL METHOD BLD: ABNORMAL
EOSINOPHIL # BLD AUTO: 0.1 K/UL (ref 0–0.5)
EOSINOPHIL NFR BLD: 1.9 % (ref 0–8)
ERYTHROCYTE [DISTWIDTH] IN BLOOD BY AUTOMATED COUNT: 15.2 % (ref 11.5–14.5)
EST. GFR  (NO RACE VARIABLE): 7.1 ML/MIN/1.73 M^2
GLUCOSE SERPL-MCNC: 75 MG/DL (ref 70–110)
GLUCOSE SERPL-MCNC: 75 MG/DL (ref 70–110)
HCT VFR BLD AUTO: 33.4 % (ref 40–54)
HCT VFR BLD CALC: 36 %PCV (ref 36–54)
HGB BLD-MCNC: 11 G/DL (ref 14–18)
IMM GRANULOCYTES # BLD AUTO: 0.03 K/UL (ref 0–0.04)
IMM GRANULOCYTES NFR BLD AUTO: 0.6 % (ref 0–0.5)
LYMPHOCYTES # BLD AUTO: 0.3 K/UL (ref 1–4.8)
LYMPHOCYTES NFR BLD: 6 % (ref 18–48)
MAGNESIUM SERPL-MCNC: 2.1 MG/DL (ref 1.6–2.6)
MCH RBC QN AUTO: 28.4 PG (ref 27–31)
MCHC RBC AUTO-ENTMCNC: 32.9 G/DL (ref 32–36)
MCV RBC AUTO: 86 FL (ref 82–98)
MONOCYTES # BLD AUTO: 0.6 K/UL (ref 0.3–1)
MONOCYTES NFR BLD: 12.1 % (ref 4–15)
NEUTROPHILS # BLD AUTO: 4.2 K/UL (ref 1.8–7.7)
NEUTROPHILS NFR BLD: 78.8 % (ref 38–73)
NRBC BLD-RTO: 0 /100 WBC
PLATELET # BLD AUTO: 198 K/UL (ref 150–450)
PMV BLD AUTO: 9.6 FL (ref 9.2–12.9)
POC IONIZED CALCIUM: 0.98 MMOL/L (ref 1.06–1.42)
POC TCO2 (MEASURED): 21 MMOL/L (ref 23–29)
POTASSIUM BLD-SCNC: 8.5 MMOL/L (ref 3.5–5.1)
POTASSIUM SERPL-SCNC: 7.7 MMOL/L (ref 3.5–5.1)
PROT SERPL-MCNC: 8.2 G/DL (ref 6–8.4)
RBC # BLD AUTO: 3.88 M/UL (ref 4.6–6.2)
SAMPLE: ABNORMAL
SODIUM BLD-SCNC: 123 MMOL/L (ref 136–145)
SODIUM SERPL-SCNC: 127 MMOL/L (ref 136–145)
TROPONIN I SERPL DL<=0.01 NG/ML-MCNC: 16 NG/L (ref 0–35)
WBC # BLD AUTO: 5.3 K/UL (ref 3.9–12.7)

## 2025-01-08 PROCEDURE — 93010 ELECTROCARDIOGRAM REPORT: CPT | Mod: ,,, | Performed by: INTERNAL MEDICINE

## 2025-01-08 PROCEDURE — 99291 CRITICAL CARE FIRST HOUR: CPT

## 2025-01-08 PROCEDURE — 83880 ASSAY OF NATRIURETIC PEPTIDE: CPT

## 2025-01-08 PROCEDURE — 93005 ELECTROCARDIOGRAM TRACING: CPT

## 2025-01-08 PROCEDURE — 80053 COMPREHEN METABOLIC PANEL: CPT

## 2025-01-08 PROCEDURE — 25000003 PHARM REV CODE 250

## 2025-01-08 PROCEDURE — 63600175 PHARM REV CODE 636 W HCPCS

## 2025-01-08 PROCEDURE — 85025 COMPLETE CBC W/AUTO DIFF WBC: CPT

## 2025-01-08 PROCEDURE — 84484 ASSAY OF TROPONIN QUANT: CPT

## 2025-01-08 PROCEDURE — 96374 THER/PROPH/DIAG INJ IV PUSH: CPT

## 2025-01-08 PROCEDURE — 83735 ASSAY OF MAGNESIUM: CPT

## 2025-01-08 RX ORDER — CALCIUM GLUCONATE 20 MG/ML
1 INJECTION, SOLUTION INTRAVENOUS
Status: COMPLETED | OUTPATIENT
Start: 2025-01-08 | End: 2025-01-09

## 2025-01-08 RX ORDER — ONDANSETRON HYDROCHLORIDE 2 MG/ML
4 INJECTION, SOLUTION INTRAVENOUS
Status: COMPLETED | OUTPATIENT
Start: 2025-01-08 | End: 2025-01-08

## 2025-01-08 RX ORDER — FUROSEMIDE 10 MG/ML
80 INJECTION INTRAMUSCULAR; INTRAVENOUS
Status: COMPLETED | OUTPATIENT
Start: 2025-01-09 | End: 2025-01-09

## 2025-01-08 RX ORDER — CALCIUM GLUCONATE 20 MG/ML
1 INJECTION, SOLUTION INTRAVENOUS EVERY 10 MIN PRN
Status: DISCONTINUED | OUTPATIENT
Start: 2025-01-08 | End: 2025-01-09

## 2025-01-08 RX ORDER — ALBUTEROL SULFATE 2.5 MG/.5ML
10 SOLUTION RESPIRATORY (INHALATION)
Status: CANCELLED | OUTPATIENT
Start: 2025-01-08 | End: 2025-01-08

## 2025-01-08 RX ORDER — HYDRALAZINE HYDROCHLORIDE 25 MG/1
100 TABLET, FILM COATED ORAL
Status: COMPLETED | OUTPATIENT
Start: 2025-01-08 | End: 2025-01-08

## 2025-01-08 RX ORDER — CARVEDILOL 3.12 MG/1
6.25 TABLET ORAL
Status: COMPLETED | OUTPATIENT
Start: 2025-01-08 | End: 2025-01-08

## 2025-01-08 RX ADMIN — CARVEDILOL 6.25 MG: 3.12 TABLET, FILM COATED ORAL at 11:01

## 2025-01-08 RX ADMIN — HYDRALAZINE HYDROCHLORIDE 100 MG: 25 TABLET ORAL at 11:01

## 2025-01-08 RX ADMIN — ONDANSETRON 4 MG: 2 INJECTION INTRAMUSCULAR; INTRAVENOUS at 10:01

## 2025-01-09 PROBLEM — R79.89 ELEVATED BRAIN NATRIURETIC PEPTIDE (BNP) LEVEL: Status: ACTIVE | Noted: 2025-01-09

## 2025-01-09 LAB
ALBUMIN SERPL BCP-MCNC: 2.8 G/DL (ref 3.5–5.2)
ALP SERPL-CCNC: 578 U/L (ref 40–150)
ALT SERPL W/O P-5'-P-CCNC: 37 U/L (ref 10–44)
ANION GAP SERPL CALC-SCNC: 11 MMOL/L (ref 8–16)
ANION GAP SERPL CALC-SCNC: 12 MMOL/L (ref 8–16)
ANION GAP SERPL CALC-SCNC: 12 MMOL/L (ref 8–16)
ANION GAP SERPL CALC-SCNC: 9 MMOL/L (ref 8–16)
ASCENDING AORTA: 3.43 CM
AST SERPL-CCNC: 49 U/L (ref 10–40)
AV AREA BY CONTINUOUS VTI: 2.5 CM2
AV INDEX (PROSTH): 0.74
AV LVOT MEAN GRADIENT: 3 MMHG
AV LVOT PEAK GRADIENT: 6 MMHG
AV MEAN GRADIENT: 5.2 MMHG
AV PEAK GRADIENT: 10.2 MMHG
AV VALVE AREA BY VELOCITY RATIO: 2.6 CM²
AV VALVE AREA: 2.6 CM2
AV VELOCITY RATIO: 0.75
BASOPHILS # BLD AUTO: 0.04 K/UL (ref 0–0.2)
BASOPHILS NFR BLD: 0.6 % (ref 0–1.9)
BILIRUB SERPL-MCNC: 2.1 MG/DL (ref 0.1–1)
BSA FOR ECHO PROCEDURE: 2.11 M2
BUN SERPL-MCNC: 100 MG/DL (ref 6–30)
BUN SERPL-MCNC: 39 MG/DL (ref 6–30)
BUN SERPL-MCNC: 40 MG/DL (ref 6–20)
BUN SERPL-MCNC: 43 MG/DL (ref 6–30)
BUN SERPL-MCNC: 45 MG/DL (ref 6–20)
BUN SERPL-MCNC: 45 MG/DL (ref 6–20)
BUN SERPL-MCNC: 82 MG/DL (ref 6–20)
CALCIUM SERPL-MCNC: 9 MG/DL (ref 8.7–10.5)
CALCIUM SERPL-MCNC: 9 MG/DL (ref 8.7–10.5)
CALCIUM SERPL-MCNC: 9.2 MG/DL (ref 8.7–10.5)
CALCIUM SERPL-MCNC: 9.4 MG/DL (ref 8.7–10.5)
CHLORIDE SERPL-SCNC: 94 MMOL/L (ref 95–110)
CHLORIDE SERPL-SCNC: 95 MMOL/L (ref 95–110)
CHLORIDE SERPL-SCNC: 95 MMOL/L (ref 95–110)
CHLORIDE SERPL-SCNC: 96 MMOL/L (ref 95–110)
CHLORIDE SERPL-SCNC: 97 MMOL/L (ref 95–110)
CO2 SERPL-SCNC: 19 MMOL/L (ref 23–29)
CO2 SERPL-SCNC: 21 MMOL/L (ref 23–29)
CO2 SERPL-SCNC: 21 MMOL/L (ref 23–29)
CO2 SERPL-SCNC: 23 MMOL/L (ref 23–29)
CREAT SERPL-MCNC: 4.4 MG/DL (ref 0.5–1.4)
CREAT SERPL-MCNC: 5.2 MG/DL (ref 0.5–1.4)
CREAT SERPL-MCNC: 5.2 MG/DL (ref 0.5–1.4)
CREAT SERPL-MCNC: 5.8 MG/DL (ref 0.5–1.4)
CREAT SERPL-MCNC: 6.3 MG/DL (ref 0.5–1.4)
CREAT SERPL-MCNC: 7.7 MG/DL (ref 0.5–1.4)
CREAT SERPL-MCNC: 9 MG/DL (ref 0.5–1.4)
CV ECHO LV RWT: 0.48 CM
DIFFERENTIAL METHOD BLD: ABNORMAL
DOP CALC AO PEAK VEL: 1.6 M/S
DOP CALC AO VTI: 34.1 CM
DOP CALC LVOT AREA: 3.5 CM2
DOP CALC LVOT DIAMETER: 2.1 CM
DOP CALC LVOT PEAK VEL: 1.2 M/S
DOP CALC LVOT STROKE VOLUME: 87.9 CM3
DOP CALCLVOT PEAK VEL VTI: 25.4 CM
E WAVE DECELERATION TIME: 145.1 MS
E/A RATIO: 0.69
E/E' RATIO: 8.35 M/S
ECHO EF ESTIMATED: 60 %
ECHO LV POSTERIOR WALL: 1.2 CM (ref 0.6–1.1)
EJECTION FRACTION: 60 %
EOSINOPHIL # BLD AUTO: 0.1 K/UL (ref 0–0.5)
EOSINOPHIL NFR BLD: 1.4 % (ref 0–8)
ERYTHROCYTE [DISTWIDTH] IN BLOOD BY AUTOMATED COUNT: 15 % (ref 11.5–14.5)
EST. GFR  (NO RACE VARIABLE): 11.9 ML/MIN/1.73 M^2
EST. GFR  (NO RACE VARIABLE): 11.9 ML/MIN/1.73 M^2
EST. GFR  (NO RACE VARIABLE): 14.6 ML/MIN/1.73 M^2
EST. GFR  (NO RACE VARIABLE): 7.4 ML/MIN/1.73 M^2
FRACTIONAL SHORTENING: 32 % (ref 28–44)
GLUCOSE SERPL-MCNC: 103 MG/DL (ref 70–110)
GLUCOSE SERPL-MCNC: 111 MG/DL (ref 70–110)
GLUCOSE SERPL-MCNC: 35 MG/DL (ref 70–110)
GLUCOSE SERPL-MCNC: 88 MG/DL (ref 70–110)
GLUCOSE SERPL-MCNC: 90 MG/DL (ref 70–110)
GLUCOSE SERPL-MCNC: 90 MG/DL (ref 70–110)
GLUCOSE SERPL-MCNC: 92 MG/DL (ref 70–110)
GLUCOSE SERPL-MCNC: 96 MG/DL (ref 70–110)
HCT VFR BLD AUTO: 30.8 % (ref 40–54)
HCT VFR BLD CALC: 31 %PCV (ref 36–54)
HCT VFR BLD CALC: 32 %PCV (ref 36–54)
HCT VFR BLD CALC: 32 %PCV (ref 36–54)
HGB BLD-MCNC: 10.6 G/DL (ref 14–18)
IMM GRANULOCYTES # BLD AUTO: 0.04 K/UL (ref 0–0.04)
IMM GRANULOCYTES NFR BLD AUTO: 0.6 % (ref 0–0.5)
INTERVENTRICULAR SEPTUM: 1.2 CM (ref 0.6–1.1)
IVC DIAMETER: 1.48 CM
LA MAJOR: 5.77 CM
LA MINOR: 5.61 CM
LA WIDTH: 4.66 CM
LACTATE SERPL-SCNC: 0.8 MMOL/L (ref 0.5–2.2)
LACTATE SERPL-SCNC: 0.9 MMOL/L (ref 0.5–2.2)
LEFT ATRIUM SIZE: 4.09 CM
LEFT ATRIUM VOLUME INDEX MOD: 57.1 ML/M2
LEFT ATRIUM VOLUME INDEX: 43.7 ML/M2
LEFT ATRIUM VOLUME MOD: 120.46 ML
LEFT ATRIUM VOLUME: 92.16 CM3
LEFT INTERNAL DIMENSION IN SYSTOLE: 3.4 CM (ref 2.1–4)
LEFT VENTRICLE DIASTOLIC VOLUME INDEX: 56.99 ML/M2
LEFT VENTRICLE DIASTOLIC VOLUME: 120.25 ML
LEFT VENTRICLE MASS INDEX: 110.8 G/M2
LEFT VENTRICLE SYSTOLIC VOLUME INDEX: 22.7 ML/M2
LEFT VENTRICLE SYSTOLIC VOLUME: 47.91 ML
LEFT VENTRICULAR INTERNAL DIMENSION IN DIASTOLE: 5 CM (ref 3.5–6)
LEFT VENTRICULAR MASS: 233.7 G
LV LATERAL E/E' RATIO: 6.45
LV SEPTAL E/E' RATIO: 11.83
LYMPHOCYTES # BLD AUTO: 0.5 K/UL (ref 1–4.8)
LYMPHOCYTES NFR BLD: 7.3 % (ref 18–48)
MAGNESIUM SERPL-MCNC: 2.2 MG/DL (ref 1.6–2.6)
MCH RBC QN AUTO: 29.6 PG (ref 27–31)
MCHC RBC AUTO-ENTMCNC: 34.4 G/DL (ref 32–36)
MCV RBC AUTO: 86 FL (ref 82–98)
MONOCYTES # BLD AUTO: 0.7 K/UL (ref 0.3–1)
MONOCYTES NFR BLD: 11.3 % (ref 4–15)
MV A" WAVE DURATION": 114.18 MS
MV PEAK A VEL: 1.03 M/S
MV PEAK E VEL: 0.71 M/S
NEUTROPHILS # BLD AUTO: 5.2 K/UL (ref 1.8–7.7)
NEUTROPHILS NFR BLD: 78.8 % (ref 38–73)
NRBC BLD-RTO: 0 /100 WBC
OHS CV RV/LV RATIO: 0.74 CM
OHS QRS DURATION: 108 MS
OHS QTC CALCULATION: 477 MS
PHOSPHATE SERPL-MCNC: 8.7 MG/DL (ref 2.7–4.5)
PISA TR MAX VEL: 2.3 M/S
PLATELET # BLD AUTO: 197 K/UL (ref 150–450)
PMV BLD AUTO: 10.1 FL (ref 9.2–12.9)
POC IONIZED CALCIUM: 1.05 MMOL/L (ref 1.06–1.42)
POC IONIZED CALCIUM: 1.16 MMOL/L (ref 1.06–1.42)
POC IONIZED CALCIUM: 1.19 MMOL/L (ref 1.06–1.42)
POC TCO2 (MEASURED): 20 MMOL/L (ref 23–29)
POC TCO2 (MEASURED): 23 MMOL/L (ref 23–29)
POC TCO2 (MEASURED): 24 MMOL/L (ref 23–29)
POCT GLUCOSE: 100 MG/DL (ref 70–110)
POCT GLUCOSE: 100 MG/DL (ref 70–110)
POCT GLUCOSE: 111 MG/DL (ref 70–110)
POCT GLUCOSE: 192 MG/DL (ref 70–110)
POCT GLUCOSE: 219 MG/DL (ref 70–110)
POCT GLUCOSE: 27 MG/DL (ref 70–110)
POCT GLUCOSE: 50 MG/DL (ref 70–110)
POCT GLUCOSE: 62 MG/DL (ref 70–110)
POCT GLUCOSE: 68 MG/DL (ref 70–110)
POCT GLUCOSE: 83 MG/DL (ref 70–110)
POCT GLUCOSE: 87 MG/DL (ref 70–110)
POCT GLUCOSE: 92 MG/DL (ref 70–110)
POTASSIUM BLD-SCNC: 5.9 MMOL/L (ref 3.5–5.1)
POTASSIUM BLD-SCNC: 5.9 MMOL/L (ref 3.5–5.1)
POTASSIUM BLD-SCNC: 7.5 MMOL/L (ref 3.5–5.1)
POTASSIUM SERPL-SCNC: 5.2 MMOL/L (ref 3.5–5.1)
POTASSIUM SERPL-SCNC: 5.9 MMOL/L (ref 3.5–5.1)
POTASSIUM SERPL-SCNC: 5.9 MMOL/L (ref 3.5–5.1)
POTASSIUM SERPL-SCNC: 7.4 MMOL/L (ref 3.5–5.1)
PROT SERPL-MCNC: 7.6 G/DL (ref 6–8.4)
PTH-INTACT SERPL-MCNC: 104.8 PG/ML (ref 9–77)
PULM VEIN A" WAVE DURATION": 114.18 MS
PULM VEIN S/D RATIO: 1.71
PULMONIC VEIN PEAK A VELOCITY: 0.2 M/S
PV PEAK D VEL: 0.24 M/S
PV PEAK S VEL: 0.41 M/S
RA MAJOR: 4.66 CM
RA PRESSURE ESTIMATED: 3 MMHG
RA WIDTH: 4.4 CM
RBC # BLD AUTO: 3.58 M/UL (ref 4.6–6.2)
RIGHT ATRIAL AREA: 22.4 CM2
RIGHT VENTRICLE DIASTOLIC BASEL DIMENSION: 3.7 CM
RV TB RVSP: 5 MMHG
RV TISSUE DOPPLER FREE WALL SYSTOLIC VELOCITY 1 (APICAL 4 CHAMBER VIEW): 16.63 CM/S
SAMPLE: ABNORMAL
SINUS: 3.75 CM
SODIUM BLD-SCNC: 123 MMOL/L (ref 136–145)
SODIUM BLD-SCNC: 129 MMOL/L (ref 136–145)
SODIUM BLD-SCNC: 130 MMOL/L (ref 136–145)
SODIUM SERPL-SCNC: 124 MMOL/L (ref 136–145)
SODIUM SERPL-SCNC: 128 MMOL/L (ref 136–145)
SODIUM SERPL-SCNC: 128 MMOL/L (ref 136–145)
SODIUM SERPL-SCNC: 129 MMOL/L (ref 136–145)
STJ: 2.93 CM
TDI LATERAL: 0.11 M/S
TDI SEPTAL: 0.06 M/S
TDI: 0.09 M/S
TR MAX PG: 21 MMHG
TRICUSPID ANNULAR PLANE SYSTOLIC EXCURSION: 2.29 CM
TV PEAK GRADIENT: 21 MMHG
TV REST PULMONARY ARTERY PRESSURE: 24 MMHG
WBC # BLD AUTO: 6.56 K/UL (ref 3.9–12.7)
Z-SCORE OF LEFT VENTRICULAR DIMENSION IN END DIASTOLE: -2.8
Z-SCORE OF LEFT VENTRICULAR DIMENSION IN END SYSTOLE: -1.36

## 2025-01-09 PROCEDURE — G0257 UNSCHED DIALYSIS ESRD PT HOS: HCPCS

## 2025-01-09 PROCEDURE — 85025 COMPLETE CBC W/AUTO DIFF WBC: CPT

## 2025-01-09 PROCEDURE — 80100014 HC HEMODIALYSIS 1:1

## 2025-01-09 PROCEDURE — 63600175 PHARM REV CODE 636 W HCPCS

## 2025-01-09 PROCEDURE — 25000242 PHARM REV CODE 250 ALT 637 W/ HCPCS

## 2025-01-09 PROCEDURE — 25000003 PHARM REV CODE 250

## 2025-01-09 PROCEDURE — 96375 TX/PRO/DX INJ NEW DRUG ADDON: CPT

## 2025-01-09 PROCEDURE — 83970 ASSAY OF PARATHORMONE: CPT

## 2025-01-09 PROCEDURE — 84100 ASSAY OF PHOSPHORUS: CPT

## 2025-01-09 PROCEDURE — 25000003 PHARM REV CODE 250: Performed by: EMERGENCY MEDICINE

## 2025-01-09 PROCEDURE — 83735 ASSAY OF MAGNESIUM: CPT

## 2025-01-09 PROCEDURE — 5A1D70Z PERFORMANCE OF URINARY FILTRATION, INTERMITTENT, LESS THAN 6 HOURS PER DAY: ICD-10-PCS | Performed by: STUDENT IN AN ORGANIZED HEALTH CARE EDUCATION/TRAINING PROGRAM

## 2025-01-09 PROCEDURE — 94761 N-INVAS EAR/PLS OXIMETRY MLT: CPT

## 2025-01-09 PROCEDURE — 99291 CRITICAL CARE FIRST HOUR: CPT | Mod: ,,, | Performed by: INTERNAL MEDICINE

## 2025-01-09 PROCEDURE — 80048 BASIC METABOLIC PNL TOTAL CA: CPT | Mod: XB

## 2025-01-09 PROCEDURE — 80053 COMPREHEN METABOLIC PANEL: CPT

## 2025-01-09 PROCEDURE — 99223 1ST HOSP IP/OBS HIGH 75: CPT | Mod: ,,, | Performed by: INTERNAL MEDICINE

## 2025-01-09 PROCEDURE — 94640 AIRWAY INHALATION TREATMENT: CPT

## 2025-01-09 PROCEDURE — 83605 ASSAY OF LACTIC ACID: CPT | Mod: 91

## 2025-01-09 PROCEDURE — 12000002 HC ACUTE/MED SURGE SEMI-PRIVATE ROOM

## 2025-01-09 PROCEDURE — 80048 BASIC METABOLIC PNL TOTAL CA: CPT | Mod: 91,XB

## 2025-01-09 PROCEDURE — 83605 ASSAY OF LACTIC ACID: CPT

## 2025-01-09 RX ORDER — SODIUM CHLORIDE 9 MG/ML
INJECTION, SOLUTION INTRAVENOUS
Status: CANCELLED | OUTPATIENT
Start: 2025-01-09

## 2025-01-09 RX ORDER — LABETALOL HCL 20 MG/4 ML
SYRINGE (ML) INTRAVENOUS
Status: DISCONTINUED
Start: 2025-01-09 | End: 2025-01-09

## 2025-01-09 RX ORDER — HEPARIN SODIUM 5000 [USP'U]/ML
5000 INJECTION, SOLUTION INTRAVENOUS; SUBCUTANEOUS EVERY 8 HOURS
Status: DISCONTINUED | OUTPATIENT
Start: 2025-01-09 | End: 2025-01-10 | Stop reason: HOSPADM

## 2025-01-09 RX ORDER — HYDROCODONE BITARTRATE AND ACETAMINOPHEN 500; 5 MG/1; MG/1
TABLET ORAL CONTINUOUS
Status: DISCONTINUED | OUTPATIENT
Start: 2025-01-09 | End: 2025-01-09

## 2025-01-09 RX ORDER — SODIUM CHLORIDE 9 MG/ML
INJECTION, SOLUTION INTRAVENOUS ONCE
Status: CANCELLED | OUTPATIENT
Start: 2025-01-09 | End: 2025-01-09

## 2025-01-09 RX ORDER — CARVEDILOL 3.12 MG/1
6.25 TABLET ORAL ONCE
Status: COMPLETED | OUTPATIENT
Start: 2025-01-09 | End: 2025-01-09

## 2025-01-09 RX ORDER — CALCIUM GLUCONATE 20 MG/ML
1 INJECTION, SOLUTION INTRAVENOUS ONCE
Status: COMPLETED | OUTPATIENT
Start: 2025-01-09 | End: 2025-01-09

## 2025-01-09 RX ORDER — ALBUTEROL SULFATE 5 MG/ML
10 SOLUTION RESPIRATORY (INHALATION) CONTINUOUS
Status: DISCONTINUED | OUTPATIENT
Start: 2025-01-09 | End: 2025-01-10

## 2025-01-09 RX ORDER — INSULIN ASPART 100 [IU]/ML
0-5 INJECTION, SOLUTION INTRAVENOUS; SUBCUTANEOUS
Status: DISCONTINUED | OUTPATIENT
Start: 2025-01-09 | End: 2025-01-10

## 2025-01-09 RX ORDER — HYDRALAZINE HYDROCHLORIDE 25 MG/1
100 TABLET, FILM COATED ORAL 3 TIMES DAILY
Status: DISCONTINUED | OUTPATIENT
Start: 2025-01-09 | End: 2025-01-10 | Stop reason: HOSPADM

## 2025-01-09 RX ORDER — MUPIROCIN 20 MG/G
OINTMENT TOPICAL 2 TIMES DAILY
Status: DISCONTINUED | OUTPATIENT
Start: 2025-01-09 | End: 2025-01-10 | Stop reason: HOSPADM

## 2025-01-09 RX ORDER — SODIUM CHLORIDE 0.9 % (FLUSH) 0.9 %
10 SYRINGE (ML) INJECTION EVERY 12 HOURS PRN
Status: DISCONTINUED | OUTPATIENT
Start: 2025-01-09 | End: 2025-01-10 | Stop reason: HOSPADM

## 2025-01-09 RX ORDER — MAGNESIUM SULFATE HEPTAHYDRATE 40 MG/ML
2 INJECTION, SOLUTION INTRAVENOUS
Status: DISCONTINUED | OUTPATIENT
Start: 2025-01-09 | End: 2025-01-09

## 2025-01-09 RX ORDER — LABETALOL HCL 20 MG/4 ML
10 SYRINGE (ML) INTRAVENOUS ONCE
Status: COMPLETED | OUTPATIENT
Start: 2025-01-09 | End: 2025-01-09

## 2025-01-09 RX ORDER — NIFEDIPINE 30 MG/1
90 TABLET, EXTENDED RELEASE ORAL DAILY
Status: DISCONTINUED | OUTPATIENT
Start: 2025-01-09 | End: 2025-01-09

## 2025-01-09 RX ORDER — CARVEDILOL 3.12 MG/1
6.25 TABLET ORAL 2 TIMES DAILY WITH MEALS
Status: DISCONTINUED | OUTPATIENT
Start: 2025-01-09 | End: 2025-01-09

## 2025-01-09 RX ORDER — IBUPROFEN 200 MG
16 TABLET ORAL
Status: DISCONTINUED | OUTPATIENT
Start: 2025-01-09 | End: 2025-01-10 | Stop reason: HOSPADM

## 2025-01-09 RX ORDER — NIFEDIPINE 30 MG/1
90 TABLET, EXTENDED RELEASE ORAL DAILY
Status: DISCONTINUED | OUTPATIENT
Start: 2025-01-09 | End: 2025-01-10 | Stop reason: HOSPADM

## 2025-01-09 RX ORDER — HYDRALAZINE HYDROCHLORIDE 25 MG/1
100 TABLET, FILM COATED ORAL 3 TIMES DAILY
Status: DISCONTINUED | OUTPATIENT
Start: 2025-01-09 | End: 2025-01-09

## 2025-01-09 RX ORDER — CHLORPROMAZINE HYDROCHLORIDE 50 MG/1
50 TABLET, FILM COATED ORAL 3 TIMES DAILY PRN
Status: DISCONTINUED | OUTPATIENT
Start: 2025-01-09 | End: 2025-01-09

## 2025-01-09 RX ORDER — GABAPENTIN 100 MG/1
100 CAPSULE ORAL 3 TIMES DAILY
Status: DISCONTINUED | OUTPATIENT
Start: 2025-01-09 | End: 2025-01-09

## 2025-01-09 RX ORDER — NICARDIPINE HYDROCHLORIDE 0.2 MG/ML
0-15 INJECTION INTRAVENOUS CONTINUOUS
Status: DISCONTINUED | OUTPATIENT
Start: 2025-01-09 | End: 2025-01-09

## 2025-01-09 RX ORDER — AMOXICILLIN 250 MG
2 CAPSULE ORAL DAILY
Status: DISCONTINUED | OUTPATIENT
Start: 2025-01-09 | End: 2025-01-10 | Stop reason: HOSPADM

## 2025-01-09 RX ORDER — IBUPROFEN 200 MG
24 TABLET ORAL
Status: DISCONTINUED | OUTPATIENT
Start: 2025-01-09 | End: 2025-01-10 | Stop reason: HOSPADM

## 2025-01-09 RX ORDER — CARVEDILOL 12.5 MG/1
12.5 TABLET ORAL 2 TIMES DAILY WITH MEALS
Status: DISCONTINUED | OUTPATIENT
Start: 2025-01-09 | End: 2025-01-10 | Stop reason: HOSPADM

## 2025-01-09 RX ORDER — ACETAMINOPHEN 325 MG/1
650 TABLET ORAL EVERY 4 HOURS PRN
Status: DISCONTINUED | OUTPATIENT
Start: 2025-01-09 | End: 2025-01-10 | Stop reason: HOSPADM

## 2025-01-09 RX ORDER — TALC
6 POWDER (GRAM) TOPICAL NIGHTLY PRN
Status: DISCONTINUED | OUTPATIENT
Start: 2025-01-09 | End: 2025-01-10 | Stop reason: HOSPADM

## 2025-01-09 RX ORDER — PROCHLORPERAZINE EDISYLATE 5 MG/ML
5 INJECTION INTRAMUSCULAR; INTRAVENOUS EVERY 6 HOURS PRN
Status: DISCONTINUED | OUTPATIENT
Start: 2025-01-09 | End: 2025-01-10

## 2025-01-09 RX ORDER — GLUCAGON 1 MG
1 KIT INJECTION
Status: DISCONTINUED | OUTPATIENT
Start: 2025-01-09 | End: 2025-01-10 | Stop reason: HOSPADM

## 2025-01-09 RX ORDER — NALOXONE HCL 0.4 MG/ML
0.02 VIAL (ML) INJECTION
Status: DISCONTINUED | OUTPATIENT
Start: 2025-01-09 | End: 2025-01-10 | Stop reason: HOSPADM

## 2025-01-09 RX ORDER — MUPIROCIN 20 MG/G
OINTMENT TOPICAL 2 TIMES DAILY
Status: CANCELLED | OUTPATIENT
Start: 2025-01-09 | End: 2025-01-14

## 2025-01-09 RX ORDER — CHLORPROMAZINE HYDROCHLORIDE 25 MG/1
25 TABLET, FILM COATED ORAL 3 TIMES DAILY PRN
Status: DISCONTINUED | OUTPATIENT
Start: 2025-01-09 | End: 2025-01-10 | Stop reason: HOSPADM

## 2025-01-09 RX ADMIN — DEXTROSE MONOHYDRATE 12.5 G: 25 INJECTION, SOLUTION INTRAVENOUS at 06:01

## 2025-01-09 RX ADMIN — HYDRALAZINE HYDROCHLORIDE 100 MG: 25 TABLET ORAL at 03:01

## 2025-01-09 RX ADMIN — DEXTROSE MONOHYDRATE 500 ML: 100 INJECTION, SOLUTION INTRAVENOUS at 12:01

## 2025-01-09 RX ADMIN — Medication 10 MG/HR: at 05:01

## 2025-01-09 RX ADMIN — FUROSEMIDE 80 MG: 10 INJECTION, SOLUTION INTRAVENOUS at 12:01

## 2025-01-09 RX ADMIN — CARVEDILOL 6.25 MG: 3.12 TABLET, FILM COATED ORAL at 07:01

## 2025-01-09 RX ADMIN — DEXTROSE MONOHYDRATE 50 G: 25 INJECTION, SOLUTION INTRAVENOUS at 01:01

## 2025-01-09 RX ADMIN — SODIUM ZIRCONIUM CYCLOSILICATE 5 G: 5 POWDER, FOR SUSPENSION ORAL at 05:01

## 2025-01-09 RX ADMIN — CARVEDILOL 12.5 MG: 12.5 TABLET, FILM COATED ORAL at 05:01

## 2025-01-09 RX ADMIN — CALCIUM GLUCONATE 1 G: 20 INJECTION, SOLUTION INTRAVENOUS at 12:01

## 2025-01-09 RX ADMIN — NIFEDIPINE 90 MG: 30 TABLET, FILM COATED, EXTENDED RELEASE ORAL at 07:01

## 2025-01-09 RX ADMIN — HEPARIN SODIUM 5000 UNITS: 5000 INJECTION INTRAVENOUS; SUBCUTANEOUS at 09:01

## 2025-01-09 RX ADMIN — HYDRALAZINE HYDROCHLORIDE 100 MG: 25 TABLET ORAL at 07:01

## 2025-01-09 RX ADMIN — NICARDIPINE HYDROCHLORIDE 2.5 MG/HR: 0.2 INJECTION INTRAVENOUS at 08:01

## 2025-01-09 RX ADMIN — DEXTROSE MONOHYDRATE 25 G: 25 INJECTION, SOLUTION INTRAVENOUS at 04:01

## 2025-01-09 RX ADMIN — CARVEDILOL 6.25 MG: 3.12 TABLET, FILM COATED ORAL at 10:01

## 2025-01-09 RX ADMIN — HYDRALAZINE HYDROCHLORIDE 100 MG: 25 TABLET ORAL at 08:01

## 2025-01-09 RX ADMIN — INSULIN HUMAN 8.62 UNITS: 100 INJECTION, SOLUTION PARENTERAL at 02:01

## 2025-01-09 RX ADMIN — CALCIUM GLUCONATE 1 G: 20 INJECTION, SOLUTION INTRAVENOUS at 05:01

## 2025-01-09 RX ADMIN — DEXTROSE MONOHYDRATE 25 G: 25 INJECTION, SOLUTION INTRAVENOUS at 05:01

## 2025-01-09 RX ADMIN — DEXTROSE MONOHYDRATE 25 G: 25 INJECTION, SOLUTION INTRAVENOUS at 02:01

## 2025-01-09 RX ADMIN — SODIUM ZIRCONIUM CYCLOSILICATE 10 G: 10 POWDER, FOR SUSPENSION ORAL at 01:01

## 2025-01-09 RX ADMIN — LABETALOL HYDROCHLORIDE 10 MG: 5 INJECTION, SOLUTION INTRAVENOUS at 06:01

## 2025-01-09 NOTE — ASSESSMENT & PLAN NOTE
Patient has a current diagnosis of Hypertensive emergency with end organ damage evidenced by Elevated BNP    Temp:  [97.9 °F (36.6 °C)-98 °F (36.7 °C)]   Pulse:  []   Resp:  [14-28]   BP: (194-256)/()   SpO2:  [92 %-99 %] .     Hypertension Medications               carvediloL (COREG) 6.25 MG tablet Take 1 tablet (6.25 mg total) by mouth 2 (two) times daily with meals.    hydrALAZINE (APRESOLINE) 100 MG tablet Take 1 tablet (100 mg total) by mouth 3 (three) times daily.    NIFEdipine (PROCARDIA XL) 90 MG (OSM) 24 hr tablet Take 1 tablet (90 mg total) by mouth once daily.        -Monitor for improvement following dialysis

## 2025-01-09 NOTE — ED NOTES
I-STAT Chem-8+ Results:   Value Reference Range   Sodium 130 136-145 mmol/L   Potassium  5.9 3.5-5.1 mmol/L   Chloride 97  mmol/L   Ionized Calcium 1.16 1.06-1.42 mmol/L   CO2 (measured) 24 23-29 mmol/L   Glucose 92  mg/dL   BUN 43 6-30 mg/dL   Creatinine 5.8 0.5-1.4 mg/dL   Hematocrit 32 36-54%

## 2025-01-09 NOTE — PLAN OF CARE
Problem: Hemodialysis  Goal: Safe, Effective Therapy Delivery  Outcome: Progressing  Goal: Effective Tissue Perfusion  Outcome: Progressing  Goal: Absence of Infection Signs and Symptoms  Outcome: Progressing    Bedside Dialysis tx ended to the left arm AVF, hemostasis achieved.    Net fluid removed: 2L    IV labetalol given during tx. B/p remains elevated at the end of tx, pt asymptomatic, Dr. Yang aware    Report given to nurse Rebecca

## 2025-01-09 NOTE — SUBJECTIVE & OBJECTIVE
Past Medical History:   Diagnosis Date    Cancer     Diabetes mellitus, type 2     Diabetic foot ulcer associated with diabetes mellitus due to underlying condition 07/16/2020    ESRD on hemodialysis     Hyperlipidemia     Hypertension     Obese        Past Surgical History:   Procedure Laterality Date    AV FISTULA PLACEMENT Left 07/06/2023    Procedure: CREATION, AV FISTULA;  Surgeon: Daniel Poon MD;  Location: [facility] OR;  Service: Vascular;  Laterality: Left;  RN PREOP 6/28/2023  LABS DAY OF SURGERY    BONE BIOPSY Left 07/17/2020    Procedure: BIOPSY, BONE;  Surgeon: Verona Whitmore DPM;  Location: [facility] OR;  Service: Podiatry;  Laterality: Left;    CERVICAL DISC SURGERY  07/11/2016    COLONOSCOPY N/A 3/25/2024    Procedure: COLONOSCOPY;  Surgeon: Roopa Pérez MD;  Location: [facility] (4TH FLR);  Service: Endoscopy;  Laterality: N/A;  Ref By: Dr. Pérez   constipation prep  Diaylsis Patient Lab has been ordered  3/20-precall complete-MS    COLONOSCOPY N/A 4/8/2024    Procedure: COLONOSCOPY;  Surgeon: Roopa Pérez MD;  Location: [facility] (2ND FLR);  Service: Endoscopy;  Laterality: N/A;  Prep instructions sent via portal/given to pt in clinic  pt had to be r/s at  request  Dialysis Tues,Thurs,Sat-dw  Peg Prep-dw  4/2/24- Labs - dialysis /poor prep on 3/26 - PEG x 2 - extended prep /LVM for precall - ERW  4/5-LVM for precall-KPvt    COLONOSCOPY N/A 4/16/2024    Procedure: COLONOSCOPY;  Surgeon: Jarett Hill MD;  Location: [facility] (2ND FLR);  Service: Endoscopy;  Laterality: N/A;    DEBRIDEMENT Left 07/17/2020    Procedure: DEBRIDEMENT;  Surgeon: Verona Whitmore DPM;  Location: [facility] OR;  Service: Podiatry;  Laterality: Left;    DEBRIDEMENT OF FOOT Right     toes & plantar surface    ENDOBRONCHIAL ULTRASOUND N/A 3/1/2024    Procedure: ENDOBRONCHIAL ULTRASOUND (EBUS);  Surgeon: Francisco Fleming MD;  Location: [facility] OR 2ND FLR;  Service: Pulmonary;  Laterality: N/A;    ENDOSCOPIC  MUCOSAL RESECTION OF COLON N/A 9/9/2024    Procedure: RESECTION, MUCOSA, COLON, ENDOSCOPIC;  Surgeon: Vikas Quinonez MD;  Location: [facility] (2ND FLR);  Service: Endoscopy;  Laterality: N/A;  6/11 portal-peg-dialysis T Th Sat-+K scheduled-.colonoscopy in 3 months with [facility] doc, [facility], removal of TI polyp and assess prior EMR sites. Cristiano-tt  7/19/24: rescheduled instructions sent via portal-GD  9/3-lvm for pre call-tb  9/4-pre call complete-    ESOPHAGEAL MANOMETRY WITH MEASUREMENT OF IMPEDANCE N/A 03/27/2023    Procedure: MANOMETRY, ESOPHAGUS, WITH IMPEDANCE MEASUREMENT;  Surgeon: Roopa Pérez MD;  Location: [facility] (4TH FLR);  Service: Endoscopy;  Laterality: N/A;  Belching and rumination protocol please  instructions via portal - sm    ESOPHAGOGASTRODUODENOSCOPY N/A 12/16/2022    Procedure: EGD (ESOPHAGOGASTRODUODENOSCOPY);  Surgeon: Eren Francois MD;  Location: [facility];  Service: Endoscopy;  Laterality: N/A;  Pt. on Dialysis. K+ ordered prior to procedure.EC    ESOPHAGOGASTRODUODENOSCOPY N/A 12/5/2023    Procedure: EGD (ESOPHAGOGASTRODUODENOSCOPY);  Surgeon: Kash Johnston MD;  Location: [facility];  Service: Endoscopy;  Laterality: N/A;    FRACTURE SURGERY Right     medial ankle, metal plate present    INJECTION OF ANESTHETIC AGENT AROUND NERVE Right 2/2/2024    Procedure: BLOCK, NERVE STELLATE GANGLION *HOLD ASPIRIN 5 DAYS PRIOR*;  Surgeon: Gokul Gonzalez MD;  Location: [facility] PAIN MGT;  Service: Pain Management;  Laterality: Right;  [phone]    INSERTION OF TUNNELED CENTRAL VENOUS CATHETER (CVC) WITH SUBCUTANEOUS PORT N/A 06/11/2021    Procedure: TUNNEL CATH INSERTION WITHOUT PORT;  Surgeon: Jose Manuel Diagnostic Provider;  Location: [facility] OR;  Service: Radiology;  Laterality: N/A;  11AM START---PHONE PREOP 6/10/21---COVID POSTIVE ON 7/2020---NO S/S    left leg orthopedic surgery Left     MUSCLE BIOPSY Left 11/4/2024    Procedure: BIOPSY, MUSCLE;  Surgeon: Adithya Lewis MD;  Location: [facility]  OR;  Service: Neurosurgery;  Laterality: Left;  Left quadriceps muscle biopsy, will need standard pathology protocol for muscle sample. MAC anesthetic with local anesthetic injection    RN PREOP 10/28/2024    REVISION OF ARTERIOVENOUS FISTULA Left 3/14/2024    Procedure: REVISION, AV FISTULA;  Surgeon: Daniel Poon MD;  Location: [facility] OR;  Service: Vascular;  Laterality: Left;  RN preop 3/6/2024----NEED ORDERS    UPPER GASTROINTESTINAL ENDOSCOPY         Review of patient's allergies indicates:  No Known Allergies    Family History       Problem Relation (Age of Onset)    Heart disease Father          Tobacco Use    Smoking status: Never    Smokeless tobacco: Never   Substance and Sexual Activity    Alcohol use: Not Currently     Comment: occ rare beer    Drug use: No    Sexual activity: Not Currently      Review of Systems   Constitutional:  Positive for chills and fatigue. Negative for fever.   HENT: Negative.     Respiratory:  Positive for shortness of breath. Negative for cough, chest tightness and wheezing.    Cardiovascular:  Positive for leg swelling. Negative for chest pain and palpitations.   Gastrointestinal:  Positive for nausea. Negative for abdominal pain and vomiting.   Genitourinary: Negative.    Musculoskeletal: Negative.    Allergic/Immunologic: Negative for immunocompromised state.   Neurological: Negative.    Psychiatric/Behavioral: Negative.       Objective:     Vital Signs (Most Recent):  Temp: 98.9 °F (37.2 °C) (01/09/25 1330)  Pulse: 86 (01/09/25 1600)  Resp: (!) 21 (01/09/25 1600)  BP: (!) 143/68 (01/09/25 1600)  SpO2: 96 % (01/09/25 1600) Vital Signs (24h Range):  Temp:  [97.9 °F (36.6 °C)-98.9 °F (37.2 °C)] 98.9 °F (37.2 °C)  Pulse:  [] 86  Resp:  [13-28] 21  SpO2:  [92 %-99 %] 96 %  BP: (127-256)/() 143/68   Weight: 86.2 kg (190 lb)  Body mass index is 25.07 kg/m².      Intake/Output Summary (Last 24 hours) at 1/9/2025 1212  Last data filed at 1/9/2025 0189  Gross per  24 hour   Intake --   Output 2500 ml   Net -2500 ml          Physical Exam  Vitals and nursing note reviewed.   Constitutional:       General: He is not in acute distress.     Appearance: He is not ill-appearing or toxic-appearing.      Comments: Lethargic but arousable      HENT:      Head: Normocephalic and atraumatic.   Eyes:      Extraocular Movements: Extraocular movements intact.      Conjunctiva/sclera: Conjunctivae normal.      Pupils: Pupils are equal, round, and reactive to light.   Cardiovascular:      Rate and Rhythm: Normal rate and regular rhythm.      Pulses: Normal pulses.      Heart sounds: Normal heart sounds. No murmur heard.  Pulmonary:      Effort: No respiratory distress.      Breath sounds: No wheezing or rales.   Chest:      Chest wall: No tenderness.   Abdominal:      General: There is no distension.   Musculoskeletal:      Right lower leg: Edema present.      Left lower leg: Edema present.   Skin:     General: Skin is warm and dry.      Coloration: Skin is not jaundiced.   Neurological:      General: No focal deficit present.      Comments: Orientated to person, place, situation but not time              Vents:     Lines/Drains/Airways       Peripheral Intravenous Line  Duration                  Hemodialysis AV Fistula Left upper arm -- days         Peripheral IV - Single Lumen 01/08/25 2330 20 G Right Antecubital <1 day                  Significant Labs:    CBC/Anemia Profile:  Recent Labs   Lab 01/08/25  2253 01/08/25  2302 01/09/25  0306 01/09/25  0439   WBC 5.30  --   --  6.56   HGB 11.0*  --   --  10.6*   HCT 33.4* 36 31* 30.8*     --   --  197   MCV 86  --   --  86   RDW 15.2*  --   --  15.0*        Chemistries:  Recent Labs   Lab 01/08/25  2253 01/09/25  0439 01/09/25  0809   * 124* 129*   K 7.7* 7.4* 5.2*   CL 94* 94* 97   CO2 17* 19* 23   BUN 80* 82* 40*   CREATININE 8.0* 7.7* 4.4*   CALCIUM 9.5 9.4 9.2   ALBUMIN 3.1* 2.8*  --    PROT 8.2 7.6  --    BILITOT 2.2* 2.1*   --    ALKPHOS 622* 578*  --    ALT 42 37  --    AST 55* 49*  --    MG 2.1 2.2  --    PHOS  --  8.7*  --        All pertinent labs within the past 24 hours have been reviewed.    Significant Imaging:   Imaging Results              X-Ray Chest 1 View (Final result)  Result time 01/08/25 23:15:02      Final result by Antione Perez DO (01/08/25 23:15:02)                   Impression:      Coarse chronic interstitial prominence. No acute cardiopulmonary abnormality.      Electronically signed by: Antione Perez  Date:    01/08/2025  Time:    23:15               Narrative:    EXAMINATION:  XR CHEST 1 VIEW    CLINICAL HISTORY:  shortness of breath;    TECHNIQUE:  Single frontal view of the chest was performed.    COMPARISON:  10/23/2024.    FINDINGS:  The lungs are well expanded and clear.  No focal opacities are seen.  Nonspecific mild chronic coarse interstitial prominence, stable from prior the pleural spaces are clear.  The cardiac silhouette is enlarged.  Osseous structures demonstrate degenerative changes.  There are calcifications of the aortic arch.

## 2025-01-09 NOTE — RESIDENT HANDOFF
Handoff     Primary Team: Networked reference to record PCT  Room Number: ED 07/07     Patient Name: Catalino Mcfadden MRN: [id number]     Date of Birth: [id number] Allergies: Patient has no known allergies.     Age: 59 y.o. Admit Date: 1/8/2025     Sex: male  BMI: Body mass index is 25.07 kg/m².     Code Status: Full Code        Illness Level (current clinical status): Watcher - No    Reason for Admission: Hypertensive emergency    Brief HPI:   Catalino Mcfadden is a 59 year old male with a PMHx of T2DM, ESRD HD TThS, diffuse lymphadenopathy with extensive workup which did not signify an underlying malignancy, colon polyps (with high grade dysplasia), gastroparesis, HTN, and HLD presents with shortness of breath with associated nausea and vomiting. He states he missed his dialysis session on Tuesday because he was feeling unwell. Denies fever, chest pain, palpitations, headaches, vision changes, lightheadedness, abdominal pain, or urinary/bowel changes. He states he is compliant with his medications. On presentation to the ED, he was lethargic but orientated. Hypertensive 250/130s . WBC wnl. Sodium 127. K 7.7. BUN 80. Creatinine 8.0. . Bilirubin 2.2. BNP 2358. Trop negative. EKG with peaking T waves. CXR with chronic interstitial prominence   Stepped up to MICU for cardene gtt    Procedure Date: n/a    Hospital Course:   He was given IV lasix 80, calcium gluconate, lokelma, insulin and shifted for management of fluid overload and hyperkalemia. Nephrology was consulted and  received session of HD, removing 2L. He was restarted on his home BP medications, coreg 6.25mg BID, hydralazine 100mg TID, and nifedipine 90mg. K improved following shift and dialysis. However, he remained hypertensive following dialysis, 200s systolic and started on 2.5mcg nifedipine infusion. Throughout day, BP/MAP improved and discontinued infusion. K elevated and managed with Lokelma, calcium gluconate, and beta-agonist. He is stable for hospital  medicine.     Tasks:  Follow up with nephrology recommendations. Plan for HD while inpatient. Optimize BP medications. Consider PT/OT consult    Contingency Plan:   Consider restarting nifedipine gtt if BP unresponsive to PO and IV medications. Consult MICU     Estimated Discharge Date: 1/11/24    Discharge Disposition:   Home/Self/home health vs. SNF      Mentored By: Dr. Gan

## 2025-01-09 NOTE — ASSESSMENT & PLAN NOTE
Patient with hypertensive emergency with BNP of >2300. Not on oxygen. No history of heart failure. Last echo in 2021.  -F/u repeat echo  -Control of hypertension with dialysis and home meds

## 2025-01-09 NOTE — ASSESSMENT & PLAN NOTE
Echo    Result Date: 1/9/2025    Left Ventricle: The left ventricle is at the upper limits of normal in   size. Increased ventricular mass. Mildly increased wall thickness. Mild   septal thickening. There is concentric remodeling. Normal wall motion.   There is normal systolic function with a visually estimated ejection   fraction of 60 - 65%. Ejection fraction is approximately 60%. There is   indeterminate diastolic function.    Right Ventricle: Normal right ventricular cavity size. Wall thickness   is normal. Systolic function is normal.    Left Atrium: Left atrium is moderately dilated.    Right Atrium: Right atrium is dilated.    Aortic Valve: The aortic valve is a trileaflet valve. There is moderate   aortic valve sclerosis. Mildly calcified right and noncoronary cusps.    Pulmonary Artery: The estimated pulmonary artery systolic pressure is   24 mmHg.    Pericardium: There is a small posterior lateral effusion amnd trivial   anteriorly and small under RA.        Patient missed   Patient with elevated BNP 2000s. Received 1 time dose of IV Lasix 80 mg in the ED. One episode of HD receiving 2L. Does not appear severely overloaded on physical exam. However still complains of shortness of breath. Bilateral leg swelling but appears to be chronic (possibly related to lymphedema)     Plan  - consider diuresis    - nephrology following; planning for another round of dialysis    - normally receives dialysis TTHS

## 2025-01-09 NOTE — SUBJECTIVE & OBJECTIVE
Past Medical History:   Diagnosis Date    Cancer     Diabetes mellitus, type 2     Diabetic foot ulcer associated with diabetes mellitus due to underlying condition 07/16/2020    ESRD on hemodialysis     Hyperlipidemia     Hypertension     Obese        Past Surgical History:   Procedure Laterality Date    AV FISTULA PLACEMENT Left 07/06/2023    Procedure: CREATION, AV FISTULA;  Surgeon: Daniel Poon MD;  Location: [facility] OR;  Service: Vascular;  Laterality: Left;  RN PREOP 6/28/2023  LABS DAY OF SURGERY    BONE BIOPSY Left 07/17/2020    Procedure: BIOPSY, BONE;  Surgeon: Verona Whitmore DPM;  Location: [facility] OR;  Service: Podiatry;  Laterality: Left;    CERVICAL DISC SURGERY  07/11/2016    COLONOSCOPY N/A 3/25/2024    Procedure: COLONOSCOPY;  Surgeon: Roopa Pérez MD;  Location: [facility] (4TH FLR);  Service: Endoscopy;  Laterality: N/A;  Ref By: Dr. Pérez   constipation prep  Diaylsis Patient Lab has been ordered  3/20-precall complete-MS    COLONOSCOPY N/A 4/8/2024    Procedure: COLONOSCOPY;  Surgeon: Roopa Pérez MD;  Location: [facility] (2ND FLR);  Service: Endoscopy;  Laterality: N/A;  Prep instructions sent via portal/given to pt in clinic  pt had to be r/s at  request  Dialysis Tues,Thurs,Sat-dw  Peg Prep-dw  4/2/24- Labs - dialysis /poor prep on 3/26 - PEG x 2 - extended prep /LVM for precall - ERW  4/5-LVM for precall-KPvt    COLONOSCOPY N/A 4/16/2024    Procedure: COLONOSCOPY;  Surgeon: Jarett Hill MD;  Location: [facility] (2ND FLR);  Service: Endoscopy;  Laterality: N/A;    DEBRIDEMENT Left 07/17/2020    Procedure: DEBRIDEMENT;  Surgeon: Verona Whitmore DPM;  Location: [facility] OR;  Service: Podiatry;  Laterality: Left;    DEBRIDEMENT OF FOOT Right     toes & plantar surface    ENDOBRONCHIAL ULTRASOUND N/A 3/1/2024    Procedure: ENDOBRONCHIAL ULTRASOUND (EBUS);  Surgeon: Francisco Fleming MD;  Location: [facility] OR 2ND FLR;  Service: Pulmonary;  Laterality: N/A;    ENDOSCOPIC  MUCOSAL RESECTION OF COLON N/A 9/9/2024    Procedure: RESECTION, MUCOSA, COLON, ENDOSCOPIC;  Surgeon: Vikas Quinonez MD;  Location: [facility] (2ND FLR);  Service: Endoscopy;  Laterality: N/A;  6/11 portal-peg-dialysis T Th Sat-+K scheduled-.colonoscopy in 3 months with [facility] doc, [facility], removal of TI polyp and assess prior EMR sites. Cristiano-tt  7/19/24: rescheduled instructions sent via portal-GD  9/3-lvm for pre call-tb  9/4-pre call complete-    ESOPHAGEAL MANOMETRY WITH MEASUREMENT OF IMPEDANCE N/A 03/27/2023    Procedure: MANOMETRY, ESOPHAGUS, WITH IMPEDANCE MEASUREMENT;  Surgeon: Roopa Pérez MD;  Location: [facility] (4TH FLR);  Service: Endoscopy;  Laterality: N/A;  Belching and rumination protocol please  instructions via portal - sm    ESOPHAGOGASTRODUODENOSCOPY N/A 12/16/2022    Procedure: EGD (ESOPHAGOGASTRODUODENOSCOPY);  Surgeon: Eren Francois MD;  Location: [facility];  Service: Endoscopy;  Laterality: N/A;  Pt. on Dialysis. K+ ordered prior to procedure.EC    ESOPHAGOGASTRODUODENOSCOPY N/A 12/5/2023    Procedure: EGD (ESOPHAGOGASTRODUODENOSCOPY);  Surgeon: Kash Johnston MD;  Location: [facility];  Service: Endoscopy;  Laterality: N/A;    FRACTURE SURGERY Right     medial ankle, metal plate present    INJECTION OF ANESTHETIC AGENT AROUND NERVE Right 2/2/2024    Procedure: BLOCK, NERVE STELLATE GANGLION *HOLD ASPIRIN 5 DAYS PRIOR*;  Surgeon: Gokul Gonzalez MD;  Location: [facility] PAIN MGT;  Service: Pain Management;  Laterality: Right;  [phone]    INSERTION OF TUNNELED CENTRAL VENOUS CATHETER (CVC) WITH SUBCUTANEOUS PORT N/A 06/11/2021    Procedure: TUNNEL CATH INSERTION WITHOUT PORT;  Surgeon: Jose Manuel Diagnostic Provider;  Location: [facility] OR;  Service: Radiology;  Laterality: N/A;  11AM START---PHONE PREOP 6/10/21---COVID POSTIVE ON 7/2020---NO S/S    left leg orthopedic surgery Left     MUSCLE BIOPSY Left 11/4/2024    Procedure: BIOPSY, MUSCLE;  Surgeon: Adithya Lewis MD;  Location: [facility]  OR;  Service: Neurosurgery;  Laterality: Left;  Left quadriceps muscle biopsy, will need standard pathology protocol for muscle sample. MAC anesthetic with local anesthetic injection    RN PREOP 10/28/2024    REVISION OF ARTERIOVENOUS FISTULA Left 3/14/2024    Procedure: REVISION, AV FISTULA;  Surgeon: Daniel Poon MD;  Location: [facility] OR;  Service: Vascular;  Laterality: Left;  RN preop 3/6/2024----NEED ORDERS    UPPER GASTROINTESTINAL ENDOSCOPY         Review of patient's allergies indicates:  No Known Allergies    No current facility-administered medications on file prior to encounter.     Current Outpatient Medications on File Prior to Encounter   Medication Sig    carvediloL (COREG) 6.25 MG tablet Take 1 tablet (6.25 mg total) by mouth 2 (two) times daily with meals.    chlorproMAZINE (THORAZINE) 25 MG tablet Take 1-2 tablets (25-50 mg total) by mouth 3 (three) times daily. For hiccups. Can adjust dose for sleepiness    gabapentin (NEURONTIN) 100 MG capsule Take 1 capsule (100 mg total) by mouth 3 (three) times daily.    hydrALAZINE (APRESOLINE) 100 MG tablet Take 1 tablet (100 mg total) by mouth 3 (three) times daily.    NIFEdipine (PROCARDIA XL) 90 MG (OSM) 24 hr tablet Take 1 tablet (90 mg total) by mouth once daily.    senna-docusate 8.6-50 mg (PERICOLACE) 8.6-50 mg per tablet Take 2 tablets by mouth once daily.     Family History       Problem Relation (Age of Onset)    Heart disease Father          Tobacco Use    Smoking status: Never    Smokeless tobacco: Never   Substance and Sexual Activity    Alcohol use: Not Currently     Comment: occ rare beer    Drug use: No    Sexual activity: Not Currently     Review of Systems   Constitutional:  Positive for fatigue. Negative for activity change, chills and fever.   Respiratory:  Positive for choking and shortness of breath. Negative for chest tightness.    Cardiovascular:  Negative for chest pain, palpitations and leg swelling.   Gastrointestinal:   Positive for nausea. Negative for abdominal pain, constipation, diarrhea and vomiting.   Genitourinary:  Negative for dysuria and frequency.   Neurological:  Positive for weakness. Negative for dizziness and light-headedness.   Psychiatric/Behavioral:  Positive for decreased concentration.      Objective:     Vital Signs (Most Recent):  Temp: 98 °F (36.7 °C) (01/09/25 0424)  Pulse: 74 (01/09/25 0445)  Resp: 16 (01/09/25 0445)  BP: (!) 194/95 (01/09/25 0445)  SpO2: 98 % (01/09/25 0445) Vital Signs (24h Range):  Temp:  [97.9 °F (36.6 °C)-98 °F (36.7 °C)] 98 °F (36.7 °C)  Pulse:  [] 74  Resp:  [16-28] 16  SpO2:  [92 %-99 %] 98 %  BP: (194-256)/() 194/95     Weight: 86.2 kg (190 lb)  Body mass index is 25.07 kg/m².     Physical Exam  Constitutional:       General: He is not in acute distress.     Appearance: He is ill-appearing.      Comments: Patient lethargic and frequently falls asleep during exam.  Hiccupping    Eyes:      Extraocular Movements: Extraocular movements intact.      Conjunctiva/sclera: Conjunctivae normal.   Cardiovascular:      Rate and Rhythm: Normal rate and regular rhythm.      Pulses: Normal pulses.      Heart sounds: Normal heart sounds. No murmur heard.  Pulmonary:      Effort: Pulmonary effort is normal. No respiratory distress.      Breath sounds: Normal breath sounds. No wheezing, rhonchi or rales.   Abdominal:      General: Abdomen is flat. Bowel sounds are normal. There is no distension.      Palpations: Abdomen is soft.      Tenderness: There is no abdominal tenderness.   Musculoskeletal:      Right lower leg: No edema.      Left lower leg: No edema.      Comments: Fistula with palpable thrill in left UE.   Skin:     General: Skin is warm.   Neurological:      Mental Status: He is oriented to person, place, and time.   Psychiatric:         Mood and Affect: Mood normal.         Behavior: Behavior normal.                Significant Labs: All pertinent labs within the past 24  hours have been reviewed.    Significant Imaging: I have reviewed all pertinent imaging results/findings within the past 24 hours.

## 2025-01-09 NOTE — ASSESSMENT & PLAN NOTE
Hyponatremia is likely due to renal insufficiency. The patient's most recent sodium results are listed below.  Recent Labs     01/08/25  2253   *     Plan  - Getting dialysis  - [facility] q4h

## 2025-01-09 NOTE — CONSULTS
[facility] - Emergency Dept  Nephrology  Consult Note    Patient Name: Catalino Mcfadden  MRN: [id number]  Admission Date: 1/8/2025  Hospital Length of Stay: 0 days  Attending Provider: Nathan Nava DO   Primary Care Physician: Chauncey Perez MD  Principal Problem:<principal problem not specified>    Inpatient consult to Nephrology  Consult performed by: Dutch Gama MD  Consult ordered by: Sherry Barrios MD  Reason for consult: hyperkalemia in setting of ESRD on HD        Subjective:     HPI: Catalino is a pleasant 58 yo man w pmhx significant for T2DM, ESRD HD TThS, HTN, HLD who is being admitted to the hospital after he presented with complaints of shortness of breath, headache, diarrhea, and nausea. His last HD session was 1/4/25 (Sat), he missed his session on Tuesday because he didn't feel well. At time of interview he appeared lethargic and kept dozing off, but was able to awaken to loud verbal stimuli. His POC K was elevated at 8.5, repeat after shifting (insulin d50 lokelma lasix) was 7.5. Labs showed K 7.7. No obvious EKG changes. Na 127, BUN 80, on room air no resp distress. BP elevated into the 220s systolic. Nephrology was consulted for emergent HD for hyperkalemia.     Past Medical History:   Diagnosis Date    Cancer     Diabetes mellitus, type 2     Diabetic foot ulcer associated with diabetes mellitus due to underlying condition 07/16/2020    ESRD on hemodialysis     Hyperlipidemia     Hypertension     Obese        Past Surgical History:   Procedure Laterality Date    AV FISTULA PLACEMENT Left 07/06/2023    Procedure: CREATION, AV FISTULA;  Surgeon: Daniel Poon MD;  Location: [facility] OR;  Service: Vascular;  Laterality: Left;  RN PREOP 6/28/2023  LABS DAY OF SURGERY    BONE BIOPSY Left 07/17/2020    Procedure: BIOPSY, BONE;  Surgeon: Verona Whitmore DPM;  Location: [facility] OR;  Service: Podiatry;  Laterality: Left;    CERVICAL DISC SURGERY  07/11/2016    COLONOSCOPY N/A 3/25/2024    Procedure:  COLONOSCOPY;  Surgeon: Roopa Pérez MD;  Location: [facility] (4TH FLR);  Service: Endoscopy;  Laterality: N/A;  Ref By: Dr. Pérez   constipation prep  Diaylsis Patient Lab has been ordered  3/20-precall complete-MS    COLONOSCOPY N/A 4/8/2024    Procedure: COLONOSCOPY;  Surgeon: Roopa Pérez MD;  Location: [facility] (2ND FLR);  Service: Endoscopy;  Laterality: N/A;  Prep instructions sent via portal/given to pt in clinic  pt had to be r/s at  request  Dialysis Tues,Thurs,Sat-dw  Peg Prep-dw  4/2/24- Labs - dialysis /poor prep on 3/26 - PEG x 2 - extended prep /LVM for precall - ERW  4/5-LVM for precall-KPvt    COLONOSCOPY N/A 4/16/2024    Procedure: COLONOSCOPY;  Surgeon: Jarett Hill MD;  Location: [facility] (2ND FLR);  Service: Endoscopy;  Laterality: N/A;    DEBRIDEMENT Left 07/17/2020    Procedure: DEBRIDEMENT;  Surgeon: Verona Whitmore DPM;  Location: [facility] OR;  Service: Podiatry;  Laterality: Left;    DEBRIDEMENT OF FOOT Right     toes & plantar surface    ENDOBRONCHIAL ULTRASOUND N/A 3/1/2024    Procedure: ENDOBRONCHIAL ULTRASOUND (EBUS);  Surgeon: Francisco Fleming MD;  Location: [facility] 2ND FLR;  Service: Pulmonary;  Laterality: N/A;    ENDOSCOPIC MUCOSAL RESECTION OF COLON N/A 9/9/2024    Procedure: RESECTION, MUCOSA, COLON, ENDOSCOPIC;  Surgeon: Vikas Quinonez MD;  Location: [facility] (2ND FLR);  Service: Endoscopy;  Laterality: N/A;  6/11 portal-peg-dialysis T Th Sat-+K scheduled-.colonoscopy in 3 months with [facility] doc, [facility], removal of TI polyp and assess prior EMR sites. Cristiano-tt  7/19/24: rescheduled instructions sent via portal-GD  9/3-lvm for pre call-tb  9/4-pre call complete-    ESOPHAGEAL MANOMETRY WITH MEASUREMENT OF IMPEDANCE N/A 03/27/2023    Procedure: MANOMETRY, ESOPHAGUS, WITH IMPEDANCE MEASUREMENT;  Surgeon: Roopa Pérez MD;  Location: [facility] (4TH FLR);  Service: Endoscopy;  Laterality: N/A;  Belching and rumination protocol please  instructions via portal -  sm    ESOPHAGOGASTRODUODENOSCOPY N/A 12/16/2022    Procedure: EGD (ESOPHAGOGASTRODUODENOSCOPY);  Surgeon: Eren Francois MD;  Location: [facility] ENDO;  Service: Endoscopy;  Laterality: N/A;  Pt. on Dialysis. K+ ordered prior to procedure.EC    ESOPHAGOGASTRODUODENOSCOPY N/A 12/5/2023    Procedure: EGD (ESOPHAGOGASTRODUODENOSCOPY);  Surgeon: Kash Johnston MD;  Location: [facility] ENDO;  Service: Endoscopy;  Laterality: N/A;    FRACTURE SURGERY Right     medial ankle, metal plate present    INJECTION OF ANESTHETIC AGENT AROUND NERVE Right 2/2/2024    Procedure: BLOCK, NERVE STELLATE GANGLION *HOLD ASPIRIN 5 DAYS PRIOR*;  Surgeon: Gokul Gonzalez MD;  Location: [facility] PAIN MGT;  Service: Pain Management;  Laterality: Right;  [phone]    INSERTION OF TUNNELED CENTRAL VENOUS CATHETER (CVC) WITH SUBCUTANEOUS PORT N/A 06/11/2021    Procedure: TUNNEL CATH INSERTION WITHOUT PORT;  Surgeon: Dosc Diagnostic Provider;  Location: [facility] OR;  Service: Radiology;  Laterality: N/A;  11AM START---PHONE PREOP 6/10/21---COVID POSTIVE ON 7/2020---NO S/S    left leg orthopedic surgery Left     MUSCLE BIOPSY Left 11/4/2024    Procedure: BIOPSY, MUSCLE;  Surgeon: Adithya Lewis MD;  Location: [facility] OR;  Service: Neurosurgery;  Laterality: Left;  Left quadriceps muscle biopsy, will need standard pathology protocol for muscle sample. MAC anesthetic with local anesthetic injection    RN PREOP 10/28/2024    REVISION OF ARTERIOVENOUS FISTULA Left 3/14/2024    Procedure: REVISION, AV FISTULA;  Surgeon: Daniel Poon MD;  Location: [facility] OR;  Service: Vascular;  Laterality: Left;  RN preop 3/6/2024----NEED ORDERS    UPPER GASTROINTESTINAL ENDOSCOPY         Review of patient's allergies indicates:  No Known Allergies  Current Facility-Administered Medications   Medication Frequency    acetaminophen tablet 650 mg Q4H PRN    calcium gluconate 1 g in NS IVPB (premixed) Q10 Min PRN    carvediloL tablet 6.25 mg BID WM    chlorproMAZINE  tablet 50 mg TID PRN    dextrose 50% injection 12.5 g PRN    dextrose 50% injection 25 g PRN    dextrose 50% injection 25 g PRN    gabapentin capsule 100 mg TID    glucagon (human recombinant) injection 1 mg PRN    glucose chewable tablet 16 g PRN    glucose chewable tablet 24 g PRN    heparin (porcine) injection 5,000 Units Q8H    hydrALAZINE tablet 100 mg TID    insulin aspart U-100 pen 0-5 Units QID (AC + HS) PRN    labetalol 20 mg/4 mL (5 mg/mL) IV syring Once    melatonin tablet 6 mg Nightly PRN    naloxone 0.4 mg/mL injection 0.02 mg PRN    NIFEdipine 24 hr tablet 90 mg Daily    prochlorperazine injection Soln 5 mg Q6H PRN    senna-docusate 8.6-50 mg per tablet 2 tablet Daily    sodium chloride 0.9% flush 10 mL Q12H PRN     Current Outpatient Medications   Medication    carvediloL (COREG) 6.25 MG tablet    chlorproMAZINE (THORAZINE) 25 MG tablet    gabapentin (NEURONTIN) 100 MG capsule    hydrALAZINE (APRESOLINE) 100 MG tablet    NIFEdipine (PROCARDIA XL) 90 MG (OSM) 24 hr tablet    senna-docusate 8.6-50 mg (PERICOLACE) 8.6-50 mg per tablet     Family History       Problem Relation (Age of Onset)    Heart disease Father          Tobacco Use    Smoking status: Never    Smokeless tobacco: Never   Substance and Sexual Activity    Alcohol use: Not Currently     Comment: occ rare beer    Drug use: No    Sexual activity: Not Currently     Review of Systems   Constitutional:  Negative for chills and fever.   Respiratory:  Positive for shortness of breath.    Cardiovascular:  Negative for chest pain.   Gastrointestinal:  Positive for diarrhea.   Neurological:  Positive for weakness.   Psychiatric/Behavioral: Negative.       Objective:     Vital Signs (Most Recent):  Temp: 97.9 °F (36.6 °C) (01/08/25 2118)  Pulse: 83 (01/09/25 0402)  Resp: (!) 22 (01/09/25 0402)  BP: (!) 196/94 (01/09/25 0402)  SpO2: (!) 93 % (01/09/25 0402) Vital Signs (24h Range):  Temp:  [97.9 °F (36.6 °C)] 97.9 °F (36.6 °C)  Pulse:  []  83  Resp:  [16-28] 22  SpO2:  [92 %-99 %] 93 %  BP: (196-256)/() 196/94     Weight: 86.2 kg (190 lb) (01/08/25 2118)  Body mass index is 25.07 kg/m².  Body surface area is 2.11 meters squared.    No intake/output data recorded.     Physical Exam  Constitutional:       General: He is not in acute distress.     Appearance: Normal appearance. He is ill-appearing.      Comments: Lethargic, keeps dozing off, awakens to verbal stimuli   Cardiovascular:      Rate and Rhythm: Normal rate.      Pulses: Normal pulses.      Heart sounds: No murmur heard.  Pulmonary:      Effort: Pulmonary effort is normal.   Abdominal:      General: Abdomen is flat.      Palpations: Abdomen is soft.   Musculoskeletal:      Right lower leg: No edema.      Left lower leg: No edema.   Skin:     General: Skin is warm.      Comments: LUE AVF   Neurological:      General: No focal deficit present.      Mental Status: He is alert.          Significant Labs:  CBC:   Recent Labs   Lab 01/08/25  2253 01/08/25  2302 01/09/25  0306   WBC 5.30  --   --    RBC 3.88*  --   --    HGB 11.0*  --   --    HCT 33.4*   < > 31*     --   --    MCV 86  --   --    MCH 28.4  --   --    MCHC 32.9  --   --     < > = values in this interval not displayed.     CMP:   Recent Labs   Lab 01/08/25  2253   GLU 75   CALCIUM 9.5   ALBUMIN 3.1*   PROT 8.2   *   K 7.7*   CO2 17*   CL 94*   BUN 80*   CREATININE 8.0*   ALKPHOS 622*   ALT 42   AST 55*   BILITOT 2.2*     All labs within the past 24 hours have been reviewed.  Assessment/Plan:   Hyperkalemia  Hyponatremia  End stage renal disease on HD TThS  Metabolic acidosis  Hypertensive urgency/emergency  T2DM    Outpatient HD Information:  -Dialysis modality: Hemodialysis  -[facility]  -Nephrologist: ?  -HD TX days: Tuesday/Thursday/Saturday, duration of treatment: 4 hrs  -Last HD TX prior to hospital admission: 1/4/25  -Dialysis access: LUE AV fistula   -Residual urine: Minium  -EDW:  ?    -missed HD session Tuesday, last HD on Saturday 1/4.   -emergent HD session this morning, 3 hr 2 L UF for severe hyperkalemia. Consent obtained, left in HD unit.   -dialysis thrice weekly TThS unless more urgent indication arises  -check pth  -hg at goal 10-11  -avoid gadolinium, fleets, phos based laxatives, nsaids  -monitor BP closely, he may need IV antihypertensives, most recent  systolic    Thank you for your consult. I will follow-up with patient. Please contact us if you have any additional questions.    Dutch Gama MD  Nephrology  [facility] - Emergency Dept

## 2025-01-09 NOTE — ASSESSMENT & PLAN NOTE
Patient presented with hyperkalemia 7. Patient initially shifted with insulin, lokelma, and calcium gluconate. EKG with peaked T waves. Denies chest pain or palpitations. Received 1 dose of IV Lasix 80 mg in the ED. Got 1 session of HD removing 2L     Plan  - BNP q4 hours  - K > 7.4 > 5.2   - managing with albuterol, calcium gluconate, lokelma

## 2025-01-09 NOTE — HPI
Catalino is a pleasant 60 yo man w pmhx significant for T2DM, ESRD HD TThS, HTN, HLD who is being admitted to the hospital after he presented with complaints of shortness of breath, headache, diarrhea, and nausea. His last HD session was 1/4/25 (Sat), he missed his session on Tuesday because he didn't feel well. At time of interview he appeared lethargic and kept dozing off, but was able to awaken to loud verbal stimuli. His POC K was elevated at 8.5, repeat after shifting (insulin d50 lokelma lasix) was 7.5. Labs showed K 7.7. No obvious EKG changes. Na 127, BUN 80, on room air no resp distress. BP elevated into the 220s systolic. Nephrology was consulted for emergent HD for hyperkalemia.

## 2025-01-09 NOTE — ASSESSMENT & PLAN NOTE
Patient on TTHS dialysis schedule. Missed received session on Tuesday because feeling unwell. See hypertensive emergency     Plan  - nephrology consulted; appreciate recs  - HD today, removed 2L

## 2025-01-09 NOTE — HPI
Catalino Mcfadden is a 59 year old male with a PMHx of T2DM, ESRD HD TThS, diffuse lymphadenopathy with extensive workup which did not signify an underlying malignancy, colon polyps (with high grade dysplasia), gastroparesis, HTN, and HLD presents with shortness of breath with associated nausea and vomiting. He states he missed his dialysis session on Tuesday because he was feeling unwell. Denies fever, chest pain, palpitations, headaches, vision changes, lightheadedness, abdominal pain, or urinary/bowel changes. He states he is compliant with his medications. On presentation to the ED, he was lethargic but orientated. Hypertensive 250/130s . WBC wnl. Sodium 127. K 7.7. BUN 80. Creatinine 8.0. . Bilirubin 2.2. BNP 2358. Trop negative. EKG with peaking T waves. CXR with chronic interstitial prominence. He was given IV lasix 80, calcium gluconate, lokelma, insulin and shifted for management of fluid overload and hyperkalemia. Nephrology was consulted and  received session of HD, removing 2L. He was restarted on his home BP medications, coreg 6.25mg BID, hydralazine 100mg TID, and nifedipine 90mg    K improved following shift and dialysis. However, he remained hypertensive following dialysis, 200s systolic and started on 2.5mcg nifedipine infusion.     Admitted to ICU for management of hypertensive emergency

## 2025-01-09 NOTE — CONSULTS
Patient seen and evaluated by critical care medicine. To be admitted to ICU for further management. Full H&P to follow.     Kasia Cano, [place]  Pulmonary Critical Care  01/09/2025

## 2025-01-09 NOTE — PROGRESS NOTES
[facility] - Emergency Dept  Nephrology  Progress Note    Patient Name: Catalino Mcfadden  MRN: [id number]  Admission Date: 1/8/2025  Hospital Length of Stay: 0 days  Attending Provider: Jim Gan*   Primary Care Physician: Chauncey Perez MD  Principal Problem:Hyperkalemia    Subjective:     HPI: Catalino is a pleasant 60 yo man w pmhx significant for T2DM, ESRD HD TThS, HTN, HLD who is being admitted to the hospital after he presented with complaints of shortness of breath, headache, diarrhea, and nausea. His last HD session was 1/4/25 (Sat), he missed his session on Tuesday because he didn't feel well. At time of interview he appeared lethargic and kept dozing off, but was able to awaken to loud verbal stimuli. His POC K was elevated at 8.5, repeat after shifting (insulin d50 lokelma lasix) was 7.5. Labs showed K 7.7. No obvious EKG changes. Na 127, BUN 80, on room air no resp distress. BP elevated into the 220s systolic. Nephrology was consulted for emergent HD for hyperkalemia.     Interval hx: HD completed overnight. 2 L UF. Later admitted toICU d/t cardene drip for high BP.     Objective:     Vital Signs (Most Recent):  Temp: 98 °F (36.7 °C) (01/09/25 0424)  Pulse: 91 (01/09/25 0945)  Resp: 16 (01/09/25 0945)  BP: (!) 176/82 (01/09/25 0945)  SpO2: 98 % (01/09/25 0945) Vital Signs (24h Range):  Temp:  [97.9 °F (36.6 °C)-98 °F (36.7 °C)] 98 °F (36.7 °C)  Pulse:  [] 91  Resp:  [13-28] 16  SpO2:  [92 %-99 %] 98 %  BP: (175-256)/() 176/82     Weight: 86.2 kg (190 lb) (01/08/25 2118)  Body mass index is 25.07 kg/m².  Body surface area is 2.11 meters squared.    No intake/output data recorded.     Physical Exam  Constitutional:       General: He is not in acute distress.     Appearance: Normal appearance. He is not ill-appearing.      Comments: Mentation improved   Cardiovascular:      Rate and Rhythm: Normal rate.      Pulses: Normal pulses.      Heart sounds: No murmur heard.  Pulmonary:      Effort:  Pulmonary effort is normal.   Abdominal:      General: Abdomen is flat.      Palpations: Abdomen is soft.   Musculoskeletal:      Right lower leg: No edema.      Left lower leg: No edema.   Skin:     General: Skin is warm.      Comments: LUE AVF   Neurological:      General: No focal deficit present.      Mental Status: He is alert and oriented to person, place, and time. Mental status is at baseline.          Significant Labs:  CBC:   Recent Labs   Lab 01/09/25  0439   WBC 6.56   RBC 3.58*   HGB 10.6*   HCT 30.8*      MCV 86   MCH 29.6   MCHC 34.4     CMP:   Recent Labs   Lab 01/09/25 0439 01/09/25  0809   GLU 35* 88   CALCIUM 9.4 9.2   ALBUMIN 2.8*  --    PROT 7.6  --    * 129*   K 7.4* 5.2*   CO2 19* 23   CL 94* 97   BUN 82* 40*   CREATININE 7.7* 4.4*   ALKPHOS 578*  --    ALT 37  --    AST 49*  --    BILITOT 2.1*  --      All labs within the past 24 hours have been reviewed.  Assessment/Plan:   Hyperkalemia - improved  Hyponatremia  End stage renal disease on HD TThS  Metabolic acidosis  Hypertensive urgency/emergency  T2DM     Outpatient HD Information:  -Dialysis modality: Hemodialysis  -Outpatient HD unit: [facility]  -Nephrologist: ?  -HD TX days: Tuesday/Thursday/Saturday, duration of treatment: 4 hrs  -Last HD TX prior to hospital admission: 1/4/25  -Dialysis access: LUE AV fistula   -Residual urine: Minium  -EDW: ?     -HD completed this am. Repeat RRT today to remove more volume and improve hyperkalemia, prefer to do HD but d/t staffing limitations will do 8 hr SLED. Okay to go to floor from a nephrology perspective afterwards.   -dialysis thrice weekly TThS unless more urgent indication arises  -pth stable at 104.   -hg at goal 10-11  -avoid gadolinium, fleets, phos based laxatives, nsaids  -cardene being titrated off per icu    Thank you for your consult. I will follow-up with patient. Please contact us if you have any additional questions.    Dutch Gama MD  Nephrology  [facility] - Emergency Dept    ATTENDING PHYSICIAN ATTESTATION  I have personally verified the history and examined the patient. I thoroughly reviewed the demographic, clinical, laboratorial and imaging information available in medical records. I agree with the assessment and recommendations provided by the subspecialty resident who was under my supervision.

## 2025-01-09 NOTE — ASSESSMENT & PLAN NOTE
Lab Results   Component Value Date    HGBA1C 4.1 05/06/2024     Recent Labs     01/09/25  0010 01/09/25  0142 01/09/25  0213 01/09/25  0233 01/09/25  0433   POCTGLUCOSE 62* 100 219* 192* 27*     Antihyperglycemics (From admission, onward)      Start     Stop Route Frequency Ordered    01/09/25 0457  insulin aspart U-100 pen 0-5 Units         -- SubQ Before meals & nightly PRN 01/09/25 0358        - LDSSI  - renal diet  - POCT glucose checks TIDWM and qhs  - goal glucose 140-180

## 2025-01-09 NOTE — HPI
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 Patient is lethargic on interview but states his symptoms are associated with shortness of breath and trouble swallowing. When questioned further patient states that all of these symptoms have been going on for the past 3 years. He states he missed his dialysis appoint the previous day and has been dry retching worse for the past several hours. He denies vomiting or abdominal pain. He denies chest pain, palpitations, headaches, lightheadedness, or vision changes.     While in the ED: Blood pressure up to 250s/130s. Patient lethargic falling asleep but oriented. WBC wnl. Sodium 127. K 7.7. BUN 80. Creatinine 8.0. . Bilirubin 2.2. BNP 2358. Trop negative. EKG with peaking T waves. CXR with chronic interstitial prominence. Patient given calcium gluconate and shifted for hyperkalemia. Nephrology consulted and patient received dialysis in the ED.

## 2025-01-09 NOTE — ED NOTES
I-STAT Chem-8+ Results:   Value Reference Range   Sodium 129 136-145 mmol/L   Potassium  5.9 3.5-5.1 mmol/L   Chloride 96  mmol/L   Ionized Calcium 1.19 1.06-1.42 mmol/L   CO2 (measured) 23 23-29 mmol/L   Glucose 96  mg/dL   BUN 39 6-30 mg/dL   Creatinine 6.3 0.5-1.4 mg/dL   Hematocrit 32 36-54%

## 2025-01-09 NOTE — ED PROVIDER NOTES
Encounter Date: 1/8/2025       History     Chief Complaint   Patient presents with    multiple complaints     Nausea & sore throat for several hours, due for dialysis tx tomorrow per ems.     59-year-old male with past medical history of T2DM, ESRD, HTN, and HLD who was in his so the emergency department with complaints of nausea & persistent chronic weakness.  He complains of persisting retching but denies vomiting any abdominal pain.  No reports of recent falls or trauma.  Chart Check patient recently had muscle biopsy to assess for inclusion body myositis and is pending results.  Patient reports that he missed his Tuesday dialysis session but reports being compliant with her hypertension medication.  He denies headache, blurry vision, chest pain, UTI symptoms, and URI symptoms.    The history is provided by the patient and medical records.     Review of patient's allergies indicates:  No Known Allergies  Past Medical History:   Diagnosis Date    Cancer     Diabetes mellitus, type 2     Diabetic foot ulcer associated with diabetes mellitus due to underlying condition 07/16/2020    ESRD on hemodialysis     Hyperlipidemia     Hypertension     Obese      Past Surgical History:   Procedure Laterality Date    AV FISTULA PLACEMENT Left 07/06/2023    Procedure: CREATION, AV FISTULA;  Surgeon: Daniel Poon MD;  Location: [facility] OR;  Service: Vascular;  Laterality: Left;  RN PREOP 6/28/2023  LABS DAY OF SURGERY    BONE BIOPSY Left 07/17/2020    Procedure: BIOPSY, BONE;  Surgeon: Verona Whitmore DPM;  Location: [facility] OR;  Service: Podiatry;  Laterality: Left;    CERVICAL DISC SURGERY  07/11/2016    COLONOSCOPY N/A 3/25/2024    Procedure: COLONOSCOPY;  Surgeon: Roopa Pérez MD;  Location: [address]);  Service: Endoscopy;  Laterality: N/A;  Ref By: Dr. Pérez   constipation prep  Diaylsis Patient Lab has been ordered  3/20-precall complete-MS    COLONOSCOPY N/A 4/8/2024    Procedure: COLONOSCOPY;  Surgeon:  Roopa Pérez MD;  Location: [facility] (2ND FLR);  Service: Endoscopy;  Laterality: N/A;  Prep instructions sent via portal/given to pt in clinic  pt had to be r/s at  request  Dialysis [facility],Thurs,Sat-dw  Peg Prep-dw  4/2/24- Labs - dialysis /poor prep on 3/26 - PEG x 2 - extended prep /LVM for precall - ERW  4/5-LVM for precall-KPvt    COLONOSCOPY N/A 4/16/2024    Procedure: COLONOSCOPY;  Surgeon: Jarett Hill MD;  Location: [facility] (2ND FLR);  Service: Endoscopy;  Laterality: N/A;    DEBRIDEMENT Left 07/17/2020    Procedure: DEBRIDEMENT;  Surgeon: Verona Whitmore DPM;  Location: [facility];  Service: Podiatry;  Laterality: Left;    DEBRIDEMENT OF FOOT Right     toes & plantar surface    ENDOBRONCHIAL ULTRASOUND N/A 3/1/2024    Procedure: ENDOBRONCHIAL ULTRASOUND (EBUS);  Surgeon: Francisco Fleming MD;  Location: [facility] OR 2ND FLR;  Service: Pulmonary;  Laterality: N/A;    ENDOSCOPIC MUCOSAL RESECTION OF COLON N/A 9/9/2024    Procedure: RESECTION, MUCOSA, COLON, ENDOSCOPIC;  Surgeon: Vikas Quinonez MD;  Location: [facility] (2ND FLR);  Service: Endoscopy;  Laterality: N/A;  6/11 portal-peg-dialysis T Th Sat-+K scheduled-.colonoscopy in 3 months with [facility] doc, [facility], removal of TI polyp and assess prior EMR sites. Cristiano-tt  7/19/24: rescheduled instructions sent via portal-GD  9/3-lvm for pre call-tb  9/4-pre call complete-    ESOPHAGEAL MANOMETRY WITH MEASUREMENT OF IMPEDANCE N/A 03/27/2023    Procedure: MANOMETRY, ESOPHAGUS, WITH IMPEDANCE MEASUREMENT;  Surgeon: Roopa Pérez MD;  Location: [facility] (4TH FLR);  Service: Endoscopy;  Laterality: N/A;  Belching and rumination protocol please  instructions via portal - sm    ESOPHAGOGASTRODUODENOSCOPY N/A 12/16/2022    Procedure: EGD (ESOPHAGOGASTRODUODENOSCOPY);  Surgeon: Eren Francois MD;  Location: [facility];  Service: Endoscopy;  Laterality: N/A;  Pt. on Dialysis. K+ ordered prior to procedure.EC    ESOPHAGOGASTRODUODENOSCOPY N/A 12/5/2023     Procedure: EGD (ESOPHAGOGASTRODUODENOSCOPY);  Surgeon: Kash Johnston MD;  Location: [facility] ENDO;  Service: Endoscopy;  Laterality: N/A;    FRACTURE SURGERY Right     medial ankle, metal plate present    INJECTION OF ANESTHETIC AGENT AROUND NERVE Right 2/2/2024    Procedure: BLOCK, NERVE STELLATE GANGLION *HOLD ASPIRIN 5 DAYS PRIOR*;  Surgeon: Gokul Gonzalez MD;  Location: [facility] PAIN MGT;  Service: Pain Management;  Laterality: Right;  [phone]    INSERTION OF TUNNELED CENTRAL VENOUS CATHETER (CVC) WITH SUBCUTANEOUS PORT N/A 06/11/2021    Procedure: TUNNEL CATH INSERTION WITHOUT PORT;  Surgeon: Dosc Diagnostic Provider;  Location: [facility] OR;  Service: Radiology;  Laterality: N/A;  11AM START---PHONE PREOP 6/10/21---COVID POSTIVE ON 7/2020---NO S/S    left leg orthopedic surgery Left     MUSCLE BIOPSY Left 11/4/2024    Procedure: BIOPSY, MUSCLE;  Surgeon: Adithya Lewis MD;  Location: [facility] OR;  Service: Neurosurgery;  Laterality: Left;  Left quadriceps muscle biopsy, will need standard pathology protocol for muscle sample. MAC anesthetic with local anesthetic injection    RN PREOP 10/28/2024    REVISION OF ARTERIOVENOUS FISTULA Left 3/14/2024    Procedure: REVISION, AV FISTULA;  Surgeon: Daniel Poon MD;  Location: [facility];  Service: Vascular;  Laterality: Left;  RN preop 3/6/2024----NEED ORDERS    UPPER GASTROINTESTINAL ENDOSCOPY       Family History   Adopted: Yes   Problem Relation Name Age of Onset    Heart disease Father      Colon cancer Neg Hx      Esophageal cancer Neg Hx      Colon polyps Neg Hx      Stomach cancer Neg Hx       Social History     Tobacco Use    Smoking status: Never    Smokeless tobacco: Never   Substance Use Topics    Alcohol use: Not Currently     Comment: occ rare beer    Drug use: No     Review of Systems    Physical Exam     Initial Vitals [01/08/25 2118]   BP Pulse Resp Temp SpO2   (!) 235/150 97 16 97.9 °F (36.6 °C) 98 %      MAP       --         Physical  Exam    Constitutional: He appears well-developed and well-nourished. He is not diaphoretic.   HENT:   Head: Normocephalic and atraumatic.   Eyes: Conjunctivae are normal.   Cardiovascular:  Regular rhythm.   Tachycardia present.         Pulses:       Radial pulses are 2+ on the right side and 2+ on the left side.        Dorsalis pedis pulses are 2+ on the right side and 2+ on the left side.   Pulmonary/Chest: No respiratory distress. He has no wheezes. He exhibits no tenderness.   Abdominal: Abdomen is soft. He exhibits no distension. There is no abdominal tenderness.   Musculoskeletal:         General: No tenderness or edema. Normal range of motion.     Skin: Skin is dry.         ED Course   Critical Care    Date/Time: 1/8/2025 11:44 AM    Performed by: Nohemy Chawla MD  Authorized by: Nohemy Chawla MD  Direct patient critical care time: 20 minutes  Additional history critical care time: 5 minutes  Ordering / reviewing critical care time: 5 minutes  Documentation critical care time: 5 minutes  Consulting other physicians critical care time: 5 minutes  Total critical care time (exclusive of procedural time) : 40 minutes  Critical care was time spent personally by me on the following activities: development of treatment plan with patient or surrogate, discussions with consultants, evaluation of patient's response to treatment, examination of patient, obtaining history from patient or surrogate, ordering and performing treatments and interventions, ordering and review of radiographic studies, ordering and review of laboratory studies and re-evaluation of patient's condition.        Labs Reviewed   CBC W/ AUTO DIFFERENTIAL - Abnormal       Result Value    WBC 5.30      RBC 3.88 (*)     Hemoglobin 11.0 (*)     Hematocrit 33.4 (*)     MCV 86      MCH 28.4      MCHC 32.9      RDW 15.2 (*)     Platelets 198      MPV 9.6      Immature Granulocytes 0.6 (*)     Gran # (ANC) 4.2      Immature Grans (Abs) 0.03       Lymph # 0.3 (*)     Mono # 0.6      Eos # 0.1      Baso # 0.03      nRBC 0      Gran % 78.8 (*)     Lymph % 6.0 (*)     Mono % 12.1      Eosinophil % 1.9      Basophil % 0.6      Differential Method Automated     COMPREHENSIVE METABOLIC PANEL - Abnormal    Sodium 127 (*)     Potassium 7.7 (*)     Chloride 94 (*)     CO2 17 (*)     Glucose 75      BUN 80 (*)     Creatinine 8.0 (*)     Calcium 9.5      Total Protein 8.2      Albumin 3.1 (*)     Total Bilirubin 2.2 (*)     Alkaline Phosphatase 622 (*)     AST 55 (*)     ALT 42      eGFR 7.1 (*)     Anion Gap 16     B-TYPE NATRIURETIC PEPTIDE - Abnormal    BNP 2,358 (*)    ISTAT PROCEDURE - Abnormal    POC Glucose 75      POC BUN 97 (*)     POC Creatinine 9.4 (*)     POC Sodium 123 (*)     POC Potassium 8.5 (*)     POC Chloride 97      POC TCO2 (MEASURED) 21 (*)     POC Ionized Calcium 0.98 (*)     POC Hematocrit 36      Sample JANI     POCT GLUCOSE - Abnormal    POCT Glucose 62 (*)    POCT GLUCOSE - Abnormal    POCT Glucose 219 (*)    POCT GLUCOSE - Abnormal    POCT Glucose 192 (*)    ISTAT PROCEDURE - Abnormal    POC Glucose 103      POC  (*)     POC Creatinine 9.0 (*)     POC Sodium 123 (*)     POC Potassium 7.5 (*)     POC Chloride 97      POC TCO2 (MEASURED) 20 (*)     POC Ionized Calcium 1.05 (*)     POC Hematocrit 31 (*)     Sample JANI     MAGNESIUM    Magnesium 2.1     TROPONIN I HIGH SENSITIVITY    Troponin I High Sensitivity 16     POCT GLUCOSE    POCT Glucose 100     ISTAT CHEM8   ISTAT CHEM8   POCT GLUCOSE MONITORING CONTINUOUS          Imaging Results              X-Ray Chest 1 View (Final result)  Result time 01/08/25 23:15:02      Final result by Antione Perez, DO (01/08/25 23:15:02)                   Impression:      Coarse chronic interstitial prominence. No acute cardiopulmonary abnormality.      Electronically signed by: Antione Perez  Date:    01/08/2025  Time:    23:15               Narrative:    EXAMINATION:  XR CHEST 1 VIEW    CLINICAL  HISTORY:  shortness of breath;    TECHNIQUE:  Single frontal view of the chest was performed.    COMPARISON:  10/23/2024.    FINDINGS:  The lungs are well expanded and clear.  No focal opacities are seen.  Nonspecific mild chronic coarse interstitial prominence, stable from prior the pleural spaces are clear.  The cardiac silhouette is enlarged.  Osseous structures demonstrate degenerative changes.  There are calcifications of the aortic arch.                                       Medications   calcium gluconate 1 g in NS IVPB (premixed) (0 g Intravenous Stopped 1/9/25 0037)     And   calcium gluconate 1 g in NS IVPB (premixed) (has no administration in time range)   dextrose 50% injection 50 g (50 g Intravenous Given 1/9/25 0152)     And   dextrose 50% injection 25 g (25 g Intravenous Given 1/9/25 0214)     And   insulin regular injection 8.62 Units 0.0862 mL (8.62 Units Intravenous Given 1/9/25 0215)   ondansetron injection 4 mg (4 mg Intravenous Given 1/8/25 2253)   carvediloL tablet 6.25 mg (6.25 mg Oral Given 1/8/25 2321)   hydrALAZINE tablet 100 mg (100 mg Oral Given 1/8/25 2321)   sodium zirconium cyclosilicate packet 10 g (10 g Oral Given 1/9/25 0153)   furosemide injection 80 mg (80 mg Intravenous Given 1/9/25 0003)   dextrose 10% bolus 500 mL 500 mL (0 mLs Intravenous Stopped 1/9/25 0114)     Medical Decision Making  Differential diagnosis include but not limited to ACS, pneumonia, GERD, gastroenteritis, and electrolyte abnormality.    I-STAT remarkable for hyperkalemia which was confirmed on CMP. Physical exam unremarkable for signs of fluid overload but pt. Noted to have elevated BNP. Prior to initiation hyperkalemia protocol patient was found to be hypoglycemic different given a D10 bolus, calcium gluconate, and Lasix.  After administration of insulin and Kayexalate patient noted to remain hyperkalemic therefore nephrology consulted who was agreeable to hemodialysis in the ED prior to admission to  Hospital Medicine for hyperkalemia.    Amount and/or Complexity of Data Reviewed  Labs: ordered. Decision-making details documented in ED Course.  Radiology: ordered.  Discussion of management or test interpretation with external provider(s): Consulted with Nephrology for HD    Risk  OTC drugs.  Prescription drug management.  Decision regarding hospitalization.              Attending Attestation:   Physician Attestation Statement for Resident:  As the supervising MD   Physician Attestation Statement: I have personally seen and examined this patient.   I agree with the above history.  -:   As the supervising MD I agree with the above PE.     As the supervising MD I agree with the above treatment, course, plan, and disposition.   -: 58 y/o M ESRD on HD missed last HD presents with fatigue and nausea.   Pt makes urine. Initial potassium 7.7. meds ordered to shift as well as lasix. Hypoglycemic as well- amp of D50 ordered as well. Repeat potassium 7.5. nephrology consulted for emergent HD and pt admitted to hospital medicine for further management.   I have reviewed and agree with the residents interpretation of the following: EKG and lab data.                 ED Course as of 01/09/25 0333   Wed Jan 08, 2025   2345 BNP(!): 2,358 [AH]   2353 Critical by a potassium of 7.7.  Initiate hyperkalemia protocol. []   Thu Jan 09, 2025   0016 POCT Glucose(!): 62  Ordered D10 500 bolus []      ED Course User Index  [AH] Sherry Barrios MD                             Clinical Impression:  Final diagnoses:  [R53.1] Weakness  [E87.5] Hyperkalemia          ED Disposition Condition    Admit Stable                Sherry Barrios MD  Resident  01/08/25 0049       Sherry Barrios MD  Resident  01/09/25 0990       Nohemy Chawla MD  01/16/25 1257

## 2025-01-09 NOTE — ASSESSMENT & PLAN NOTE
K 7.7 on admission. After missing 1 session of dialysis. EKG with peaking T waves. Patient given calcium gluconate and shifted for hyperkalemia. Nephrology consulted and patient received dialysis in the ED.    Plan  - Receiving dialysis in ED  - BMP every 4 hours  - Monitor for arrhythmias with EKG and/or continuous telemetry.   - Treat the hyperkalemia with Potassium Binders, Calcium gluconate, IV insulin and dextrose, and Furosemide.

## 2025-01-09 NOTE — ASSESSMENT & PLAN NOTE
Patient presented with shortness of breath with 220s systolic and hyperkalema K 7. He was lethargic on physical exam. He states he missed his most recent dialysis session T (TTHS) because he was feeling unwell. He states he is complaint with blood pressure medications. No complaint of chest pain or back pain.     Plan  - started on nifedipine gtt (2.5 mcg) with improvement of BP/MAPs   - increased home coreg 6.25 to 12.5 BID   - started on home medications, hydralazine 100mg and PO nifedipine 90 mg  - nephrology consulted; appreciate recs   - HD session removing 2L

## 2025-01-09 NOTE — ASSESSMENT & PLAN NOTE
Creatine stable for now. BMP reviewed- noted Estimated Creatinine Clearance: 11.1 mL/min (A) (based on SCr of 8 mg/dL (H)). according to latest data. Based on current GFR, CKD stage is end stage.  Still makes urine.     -Nephrology consulted for dialysis   -Monitor UOP and serial BMP and adjust therapy as needed.   -Renally dose meds. Avoid nephrotoxic medications and procedures.

## 2025-01-09 NOTE — SUBJECTIVE & OBJECTIVE
Past Medical History:   Diagnosis Date    Cancer     Diabetes mellitus, type 2     Diabetic foot ulcer associated with diabetes mellitus due to underlying condition 07/16/2020    ESRD on hemodialysis     Hyperlipidemia     Hypertension     Obese        Past Surgical History:   Procedure Laterality Date    AV FISTULA PLACEMENT Left 07/06/2023    Procedure: CREATION, AV FISTULA;  Surgeon: Daniel Poon MD;  Location: [facility] OR;  Service: Vascular;  Laterality: Left;  RN PREOP 6/28/2023  LABS DAY OF SURGERY    BONE BIOPSY Left 07/17/2020    Procedure: BIOPSY, BONE;  Surgeon: Verona Whitmore DPM;  Location: [facility] OR;  Service: Podiatry;  Laterality: Left;    CERVICAL DISC SURGERY  07/11/2016    COLONOSCOPY N/A 3/25/2024    Procedure: COLONOSCOPY;  Surgeon: Roopa Pérez MD;  Location: [facility] (4TH FLR);  Service: Endoscopy;  Laterality: N/A;  Ref By: Dr. Pérez   constipation prep  Diaylsis Patient Lab has been ordered  3/20-precall complete-MS    COLONOSCOPY N/A 4/8/2024    Procedure: COLONOSCOPY;  Surgeon: Roopa Pérez MD;  Location: [facility] (2ND FLR);  Service: Endoscopy;  Laterality: N/A;  Prep instructions sent via portal/given to pt in clinic  pt had to be r/s at  request  Dialysis Tues,Thurs,Sat-dw  Peg Prep-dw  4/2/24- Labs - dialysis /poor prep on 3/26 - PEG x 2 - extended prep /LVM for precall - ERW  4/5-LVM for precall-KPvt    COLONOSCOPY N/A 4/16/2024    Procedure: COLONOSCOPY;  Surgeon: Jarett Hill MD;  Location: [facility] (2ND FLR);  Service: Endoscopy;  Laterality: N/A;    DEBRIDEMENT Left 07/17/2020    Procedure: DEBRIDEMENT;  Surgeon: Verona Whitmore DPM;  Location: [facility] OR;  Service: Podiatry;  Laterality: Left;    DEBRIDEMENT OF FOOT Right     toes & plantar surface    ENDOBRONCHIAL ULTRASOUND N/A 3/1/2024    Procedure: ENDOBRONCHIAL ULTRASOUND (EBUS);  Surgeon: Francisco Fleming MD;  Location: [facility] OR 2ND FLR;  Service: Pulmonary;  Laterality: N/A;    ENDOSCOPIC  MUCOSAL RESECTION OF COLON N/A 9/9/2024    Procedure: RESECTION, MUCOSA, COLON, ENDOSCOPIC;  Surgeon: Vikas Quinonez MD;  Location: [facility] (2ND FLR);  Service: Endoscopy;  Laterality: N/A;  6/11 portal-peg-dialysis T Th Sat-+K scheduled-.colonoscopy in 3 months with [facility] doc, [facility], removal of TI polyp and assess prior EMR sites. Cristiano-tt  7/19/24: rescheduled instructions sent via portal-GD  9/3-lvm for pre call-tb  9/4-pre call complete-    ESOPHAGEAL MANOMETRY WITH MEASUREMENT OF IMPEDANCE N/A 03/27/2023    Procedure: MANOMETRY, ESOPHAGUS, WITH IMPEDANCE MEASUREMENT;  Surgeon: Roopa Pérez MD;  Location: [facility] (4TH FLR);  Service: Endoscopy;  Laterality: N/A;  Belching and rumination protocol please  instructions via portal - sm    ESOPHAGOGASTRODUODENOSCOPY N/A 12/16/2022    Procedure: EGD (ESOPHAGOGASTRODUODENOSCOPY);  Surgeon: Eren Francois MD;  Location: [facility];  Service: Endoscopy;  Laterality: N/A;  Pt. on Dialysis. K+ ordered prior to procedure.EC    ESOPHAGOGASTRODUODENOSCOPY N/A 12/5/2023    Procedure: EGD (ESOPHAGOGASTRODUODENOSCOPY);  Surgeon: Kash Johnston MD;  Location: [facility];  Service: Endoscopy;  Laterality: N/A;    FRACTURE SURGERY Right     medial ankle, metal plate present    INJECTION OF ANESTHETIC AGENT AROUND NERVE Right 2/2/2024    Procedure: BLOCK, NERVE STELLATE GANGLION *HOLD ASPIRIN 5 DAYS PRIOR*;  Surgeon: Gokul Gonzalez MD;  Location: [facility] PAIN MGT;  Service: Pain Management;  Laterality: Right;  [phone]    INSERTION OF TUNNELED CENTRAL VENOUS CATHETER (CVC) WITH SUBCUTANEOUS PORT N/A 06/11/2021    Procedure: TUNNEL CATH INSERTION WITHOUT PORT;  Surgeon: Jose Manuel Diagnostic Provider;  Location: [facility] OR;  Service: Radiology;  Laterality: N/A;  11AM START---PHONE PREOP 6/10/21---COVID POSTIVE ON 7/2020---NO S/S    left leg orthopedic surgery Left     MUSCLE BIOPSY Left 11/4/2024    Procedure: BIOPSY, MUSCLE;  Surgeon: Adithya Lewis MD;  Location: [facility]  OR;  Service: Neurosurgery;  Laterality: Left;  Left quadriceps muscle biopsy, will need standard pathology protocol for muscle sample. MAC anesthetic with local anesthetic injection    RN PREOP 10/28/2024    REVISION OF ARTERIOVENOUS FISTULA Left 3/14/2024    Procedure: REVISION, AV FISTULA;  Surgeon: Daniel Poon MD;  Location: [facility] OR;  Service: Vascular;  Laterality: Left;  RN preop 3/6/2024----NEED ORDERS    UPPER GASTROINTESTINAL ENDOSCOPY         Review of patient's allergies indicates:  No Known Allergies  Current Facility-Administered Medications   Medication Frequency    acetaminophen tablet 650 mg Q4H PRN    calcium gluconate 1 g in NS IVPB (premixed) Q10 Min PRN    carvediloL tablet 6.25 mg BID WM    chlorproMAZINE tablet 50 mg TID PRN    dextrose 50% injection 12.5 g PRN    dextrose 50% injection 25 g PRN    dextrose 50% injection 25 g PRN    gabapentin capsule 100 mg TID    glucagon (human recombinant) injection 1 mg PRN    glucose chewable tablet 16 g PRN    glucose chewable tablet 24 g PRN    heparin (porcine) injection 5,000 Units Q8H    hydrALAZINE tablet 100 mg TID    insulin aspart U-100 pen 0-5 Units QID (AC + HS) PRN    labetalol 20 mg/4 mL (5 mg/mL) IV syring Once    melatonin tablet 6 mg Nightly PRN    naloxone 0.4 mg/mL injection 0.02 mg PRN    NIFEdipine 24 hr tablet 90 mg Daily    prochlorperazine injection Soln 5 mg Q6H PRN    senna-docusate 8.6-50 mg per tablet 2 tablet Daily    sodium chloride 0.9% flush 10 mL Q12H PRN     Current Outpatient Medications   Medication    carvediloL (COREG) 6.25 MG tablet    chlorproMAZINE (THORAZINE) 25 MG tablet    gabapentin (NEURONTIN) 100 MG capsule    hydrALAZINE (APRESOLINE) 100 MG tablet    NIFEdipine (PROCARDIA XL) 90 MG (OSM) 24 hr tablet    senna-docusate 8.6-50 mg (PERICOLACE) 8.6-50 mg per tablet     Family History       Problem Relation (Age of Onset)    Heart disease Father          Tobacco Use    Smoking status: Never    Smokeless  tobacco: Never   Substance and Sexual Activity    Alcohol use: Not Currently     Comment: occ rare beer    Drug use: No    Sexual activity: Not Currently     Review of Systems   Constitutional:  Negative for chills and fever.   Respiratory:  Positive for shortness of breath.    Cardiovascular:  Negative for chest pain.   Gastrointestinal:  Positive for diarrhea.   Neurological:  Positive for weakness.   Psychiatric/Behavioral: Negative.       Objective:     Vital Signs (Most Recent):  Temp: 97.9 °F (36.6 °C) (01/08/25 2118)  Pulse: 83 (01/09/25 0402)  Resp: (!) 22 (01/09/25 0402)  BP: (!) 196/94 (01/09/25 0402)  SpO2: (!) 93 % (01/09/25 0402) Vital Signs (24h Range):  Temp:  [97.9 °F (36.6 °C)] 97.9 °F (36.6 °C)  Pulse:  [] 83  Resp:  [16-28] 22  SpO2:  [92 %-99 %] 93 %  BP: (196-256)/() 196/94     Weight: 86.2 kg (190 lb) (01/08/25 2118)  Body mass index is 25.07 kg/m².  Body surface area is 2.11 meters squared.    No intake/output data recorded.     Physical Exam  Constitutional:       General: He is not in acute distress.     Appearance: Normal appearance. He is ill-appearing.      Comments: Lethargic, keeps dozing off, awakens to verbal stimuli   Cardiovascular:      Rate and Rhythm: Normal rate.      Pulses: Normal pulses.      Heart sounds: No murmur heard.  Pulmonary:      Effort: Pulmonary effort is normal.   Abdominal:      General: Abdomen is flat.      Palpations: Abdomen is soft.   Musculoskeletal:      Right lower leg: No edema.      Left lower leg: No edema.   Skin:     General: Skin is warm.      Comments: LUE AVF   Neurological:      General: No focal deficit present.      Mental Status: He is alert.          Significant Labs:  CBC:   Recent Labs   Lab 01/08/25  2253 01/08/25  2302 01/09/25  0306   WBC 5.30  --   --    RBC 3.88*  --   --    HGB 11.0*  --   --    HCT 33.4*   < > 31*     --   --    MCV 86  --   --    MCH 28.4  --   --    MCHC 32.9  --   --     < > = values in this  interval not displayed.     CMP:   Recent Labs   Lab 01/08/25  2253   GLU 75   CALCIUM 9.5   ALBUMIN 3.1*   PROT 8.2   *   K 7.7*   CO2 17*   CL 94*   BUN 80*   CREATININE 8.0*   ALKPHOS 622*   ALT 42   AST 55*   BILITOT 2.2*     All labs within the past 24 hours have been reviewed.

## 2025-01-09 NOTE — SUBJECTIVE & OBJECTIVE
Interval hx: HD completed overnight. 2 L UF. Later admitted toU d/t cardene drip for high BP.     Objective:     Vital Signs (Most Recent):  Temp: 98 °F (36.7 °C) (01/09/25 0424)  Pulse: 91 (01/09/25 0945)  Resp: 16 (01/09/25 0945)  BP: (!) 176/82 (01/09/25 0945)  SpO2: 98 % (01/09/25 0945) Vital Signs (24h Range):  Temp:  [97.9 °F (36.6 °C)-98 °F (36.7 °C)] 98 °F (36.7 °C)  Pulse:  [] 91  Resp:  [13-28] 16  SpO2:  [92 %-99 %] 98 %  BP: (175-256)/() 176/82     Weight: 86.2 kg (190 lb) (01/08/25 2118)  Body mass index is 25.07 kg/m².  Body surface area is 2.11 meters squared.    No intake/output data recorded.     Physical Exam  Constitutional:       General: He is not in acute distress.     Appearance: Normal appearance. He is not ill-appearing.      Comments: Mentation improved   Cardiovascular:      Rate and Rhythm: Normal rate.      Pulses: Normal pulses.      Heart sounds: No murmur heard.  Pulmonary:      Effort: Pulmonary effort is normal.   Abdominal:      General: Abdomen is flat.      Palpations: Abdomen is soft.   Musculoskeletal:      Right lower leg: No edema.      Left lower leg: No edema.   Skin:     General: Skin is warm.      Comments: LUE AVSARAH   Neurological:      General: No focal deficit present.      Mental Status: He is alert and oriented to person, place, and time. Mental status is at baseline.          Significant Labs:  CBC:   Recent Labs   Lab 01/09/25 0439   WBC 6.56   RBC 3.58*   HGB 10.6*   HCT 30.8*      MCV 86   MCH 29.6   MCHC 34.4     CMP:   Recent Labs   Lab 01/09/25 0439 01/09/25 0809   GLU 35* 88   CALCIUM 9.4 9.2   ALBUMIN 2.8*  --    PROT 7.6  --    * 129*   K 7.4* 5.2*   CO2 19* 23   CL 94* 97   BUN 82* 40*   CREATININE 7.7* 4.4*   ALKPHOS 578*  --    ALT 37  --    AST 49*  --    BILITOT 2.1*  --      All labs within the past 24 hours have been reviewed.

## 2025-01-09 NOTE — ED NOTES
I-STAT Chem-8+ Results:   Value Reference Range   Sodium 123 136-145 mmol/L   Potassium  8.5 3.5-5.1 mmol/L   Chloride 97  mmol/L   Ionized Calcium 0.98 1.06-1.42 mmol/L   CO2 (measured) 21 23-29 mmol/L   Glucose 75  mg/dL   BUN 97 6-30 mg/dL   Creatinine 9.4 0.5-1.4 mg/dL   Hematocrit 36 36-54%

## 2025-01-09 NOTE — H&P
[facility] - Emergency Dept  Critical Care Medicine  History & Physical    Patient Name: Catalino Mcfadden  MRN: [id number]  Admission Date: 1/8/2025  Hospital Length of Stay: 0 days  Code Status: Full Code  Attending Physician: Jim Gan*   Primary Care Provider: Chauncey Perez MD   Principal Problem: Hypertensive emergency    Subjective:     HPI:  Catalino Mcfadden is a 59 year old male with a PMHx of T2DM, ESRD HD TThS, diffuse lymphadenopathy with extensive workup which did not signify an underlying malignancy, colon polyps (with high grade dysplasia), gastroparesis, HTN, and HLD presents with shortness of breath with associated nausea and vomiting. He states he missed his dialysis session on Tuesday because he was feeling unwell. Denies fever, chest pain, palpitations, headaches, vision changes, lightheadedness, abdominal pain, or urinary/bowel changes. He states he is compliant with his medications. On presentation to the ED, he was lethargic but orientated. Hypertensive 250/130s . WBC wnl. Sodium 127. K 7.7. BUN 80. Creatinine 8.0. . Bilirubin 2.2. BNP 2358. Trop negative. EKG with peaking T waves. CXR with chronic interstitial prominence. He was given IV lasix 80, calcium gluconate, lokelma, insulin and shifted for management of fluid overload and hyperkalemia. Nephrology was consulted and  received session of HD, removing 2L. He was restarted on his home BP medications, coreg 6.25mg BID, hydralazine 100mg TID, and nifedipine 90mg    K improved following shift and dialysis. However, he remained hypertensive following dialysis, 200s systolic and started on 2.5mcg nifedipine infusion.     Admitted to ICU for management of hypertensive emergency                Hospital/ICU Course:  No notes on file     Past Medical History:   Diagnosis Date    Cancer     Diabetes mellitus, type 2     Diabetic foot ulcer associated with diabetes mellitus due to underlying condition 07/16/2020    ESRD on hemodialysis      Hyperlipidemia     Hypertension     Obese        Past Surgical History:   Procedure Laterality Date    AV FISTULA PLACEMENT Left 07/06/2023    Procedure: CREATION, AV FISTULA;  Surgeon: Daniel Poon MD;  Location: [facility] OR;  Service: Vascular;  Laterality: Left;  RN PREOP 6/28/2023  LABS DAY OF SURGERY    BONE BIOPSY Left 07/17/2020    Procedure: BIOPSY, BONE;  Surgeon: Verona Whitmore DPM;  Location: [facility] OR;  Service: Podiatry;  Laterality: Left;    CERVICAL DISC SURGERY  07/11/2016    COLONOSCOPY N/A 3/25/2024    Procedure: COLONOSCOPY;  Surgeon: Roopa Pérez MD;  Location: [facility] (4TH FLR);  Service: Endoscopy;  Laterality: N/A;  Ref By: Dr. Pérez   constipation prep  Diaylsis Patient Lab has been ordered  3/20-precall complete-MS    COLONOSCOPY N/A 4/8/2024    Procedure: COLONOSCOPY;  Surgeon: Roopa Pérez MD;  Location: [facility] (2ND FLR);  Service: Endoscopy;  Laterality: N/A;  Prep instructions sent via portal/given to pt in clinic  pt had to be r/s at  request  Dialysis Tues,Thurs,Sat-dw  Peg Prep-dw  4/2/24- Labs - dialysis /poor prep on 3/26 - PEG x 2 - extended prep /LVM for precall - ERW  4/5-LVM for precall-KPvt    COLONOSCOPY N/A 4/16/2024    Procedure: COLONOSCOPY;  Surgeon: Jarett Hill MD;  Location: [facility] (2ND FLR);  Service: Endoscopy;  Laterality: N/A;    DEBRIDEMENT Left 07/17/2020    Procedure: DEBRIDEMENT;  Surgeon: Verona Whitmore DPM;  Location: [facility] OR;  Service: Podiatry;  Laterality: Left;    DEBRIDEMENT OF FOOT Right     toes & plantar surface    ENDOBRONCHIAL ULTRASOUND N/A 3/1/2024    Procedure: ENDOBRONCHIAL ULTRASOUND (EBUS);  Surgeon: Francisco Fleming MD;  Location: [facility] OR 2ND FLR;  Service: Pulmonary;  Laterality: N/A;    ENDOSCOPIC MUCOSAL RESECTION OF COLON N/A 9/9/2024    Procedure: RESECTION, MUCOSA, COLON, ENDOSCOPIC;  Surgeon: Vikas Quinonez MD;  Location: [facility] (2ND FLR);  Service: Endoscopy;  Laterality: N/A;  6/11  portal-peg-dialysis T Th Sat-+K scheduled-.colonoscopy in 3 months with [facility] doc, [facility], removal of TI polyp and assess prior EMR sites. Quinonez-tt  7/19/24: rescheduled instructions sent via portal-GD  9/3-lvm for pre call-tb  9/4-pre call complete-    ESOPHAGEAL MANOMETRY WITH MEASUREMENT OF IMPEDANCE N/A 03/27/2023    Procedure: MANOMETRY, ESOPHAGUS, WITH IMPEDANCE MEASUREMENT;  Surgeon: Roopa Pérez MD;  Location: [facility] ENDO (4TH FLR);  Service: Endoscopy;  Laterality: N/A;  Belching and rumination protocol please  instructions via portal - sm    ESOPHAGOGASTRODUODENOSCOPY N/A 12/16/2022    Procedure: EGD (ESOPHAGOGASTRODUODENOSCOPY);  Surgeon: Eren Francois MD;  Location: [facility];  Service: Endoscopy;  Laterality: N/A;  Pt. on Dialysis. K+ ordered prior to procedure.EC    ESOPHAGOGASTRODUODENOSCOPY N/A 12/5/2023    Procedure: EGD (ESOPHAGOGASTRODUODENOSCOPY);  Surgeon: Kash Johnston MD;  Location: [facility];  Service: Endoscopy;  Laterality: N/A;    FRACTURE SURGERY Right     medial ankle, metal plate present    INJECTION OF ANESTHETIC AGENT AROUND NERVE Right 2/2/2024    Procedure: BLOCK, NERVE STELLATE GANGLION *HOLD ASPIRIN 5 DAYS PRIOR*;  Surgeon: Gokul Gonzalez MD;  Location: [facility];  Service: Pain Management;  Laterality: Right;  [phone]    INSERTION OF TUNNELED CENTRAL VENOUS CATHETER (CVC) WITH SUBCUTANEOUS PORT N/A 06/11/2021    Procedure: TUNNEL CATH INSERTION WITHOUT PORT;  Surgeon: Jose Manuel Diagnostic Provider;  Location: [facility] OR;  Service: Radiology;  Laterality: N/A;  11AM START---PHONE PREOP 6/10/21---COVID POSTIVE ON 7/2020---NO S/S    left leg orthopedic surgery Left     MUSCLE BIOPSY Left 11/4/2024    Procedure: BIOPSY, MUSCLE;  Surgeon: Adithya Lewis MD;  Location: [facility] OR;  Service: Neurosurgery;  Laterality: Left;  Left quadriceps muscle biopsy, will need standard pathology protocol for muscle sample. MAC anesthetic with local anesthetic injection    RN PREOP  10/28/2024    REVISION OF ARTERIOVENOUS FISTULA Left 3/14/2024    Procedure: REVISION, AV FISTULA;  Surgeon: Daniel Poon MD;  Location: [facility];  Service: Vascular;  Laterality: Left;  RN preop 3/6/2024----NEED ORDERS    UPPER GASTROINTESTINAL ENDOSCOPY         Review of patient's allergies indicates:  No Known Allergies    Family History       Problem Relation (Age of Onset)    Heart disease Father          Tobacco Use    Smoking status: Never    Smokeless tobacco: Never   Substance and Sexual Activity    Alcohol use: Not Currently     Comment: occ rare beer    Drug use: No    Sexual activity: Not Currently      Review of Systems   Constitutional:  Positive for chills and fatigue. Negative for fever.   HENT: Negative.     Respiratory:  Positive for shortness of breath. Negative for cough, chest tightness and wheezing.    Cardiovascular:  Positive for leg swelling. Negative for chest pain and palpitations.   Gastrointestinal:  Positive for nausea. Negative for abdominal pain and vomiting.   Genitourinary: Negative.    Musculoskeletal: Negative.    Allergic/Immunologic: Negative for immunocompromised state.   Neurological: Negative.    Psychiatric/Behavioral: Negative.       Objective:     Vital Signs (Most Recent):  Temp: 98.9 °F (37.2 °C) (01/09/25 1330)  Pulse: 86 (01/09/25 1600)  Resp: (!) 21 (01/09/25 1600)  BP: (!) 143/68 (01/09/25 1600)  SpO2: 96 % (01/09/25 1600) Vital Signs (24h Range):  Temp:  [97.9 °F (36.6 °C)-98.9 °F (37.2 °C)] 98.9 °F (37.2 °C)  Pulse:  [] 86  Resp:  [13-28] 21  SpO2:  [92 %-99 %] 96 %  BP: (127-256)/() 143/68   Weight: 86.2 kg (190 lb)  Body mass index is 25.07 kg/m².      Intake/Output Summary (Last 24 hours) at 1/9/2025 1964  Last data filed at 1/9/2025 0736  Gross per 24 hour   Intake --   Output 2500 ml   Net -2500 ml          Physical Exam  Vitals and nursing note reviewed.   Constitutional:       General: He is not in acute distress.     Appearance: He is  not ill-appearing or toxic-appearing.      Comments: Lethargic but arousable      HENT:      Head: Normocephalic and atraumatic.   Eyes:      Extraocular Movements: Extraocular movements intact.      Conjunctiva/sclera: Conjunctivae normal.      Pupils: Pupils are equal, round, and reactive to light.   Cardiovascular:      Rate and Rhythm: Normal rate and regular rhythm.      Pulses: Normal pulses.      Heart sounds: Normal heart sounds. No murmur heard.  Pulmonary:      Effort: No respiratory distress.      Breath sounds: No wheezing or rales.   Chest:      Chest wall: No tenderness.   Abdominal:      General: There is no distension.   Musculoskeletal:      Right lower leg: Edema present.      Left lower leg: Edema present.   Skin:     General: Skin is warm and dry.      Coloration: Skin is not jaundiced.   Neurological:      General: No focal deficit present.      Comments: Orientated to person, place, situation but not time              Vents:     Lines/Drains/Airways       Peripheral Intravenous Line  Duration                  Hemodialysis AV Fistula Left upper arm -- days         Peripheral IV - Single Lumen 01/08/25 2330 20 G Right Antecubital <1 day                  Significant Labs:    CBC/Anemia Profile:  Recent Labs   Lab 01/08/25  2253 01/08/25  2302 01/09/25  0306 01/09/25  0439   WBC 5.30  --   --  6.56   HGB 11.0*  --   --  10.6*   HCT 33.4* 36 31* 30.8*     --   --  197   MCV 86  --   --  86   RDW 15.2*  --   --  15.0*        Chemistries:  Recent Labs   Lab 01/08/25  2253 01/09/25  0439 01/09/25  0809   * 124* 129*   K 7.7* 7.4* 5.2*   CL 94* 94* 97   CO2 17* 19* 23   BUN 80* 82* 40*   CREATININE 8.0* 7.7* 4.4*   CALCIUM 9.5 9.4 9.2   ALBUMIN 3.1* 2.8*  --    PROT 8.2 7.6  --    BILITOT 2.2* 2.1*  --    ALKPHOS 622* 578*  --    ALT 42 37  --    AST 55* 49*  --    MG 2.1 2.2  --    PHOS  --  8.7*  --        All pertinent labs within the past 24 hours have been reviewed.    Significant  Imaging:   Imaging Results              X-Ray Chest 1 View (Final result)  Result time 01/08/25 23:15:02      Final result by Antione Perez, DO (01/08/25 23:15:02)                   Impression:      Coarse chronic interstitial prominence. No acute cardiopulmonary abnormality.      Electronically signed by: Antione Perez  Date:    01/08/2025  Time:    23:15               Narrative:    EXAMINATION:  XR CHEST 1 VIEW    CLINICAL HISTORY:  shortness of breath;    TECHNIQUE:  Single frontal view of the chest was performed.    COMPARISON:  10/23/2024.    FINDINGS:  The lungs are well expanded and clear.  No focal opacities are seen.  Nonspecific mild chronic coarse interstitial prominence, stable from prior the pleural spaces are clear.  The cardiac silhouette is enlarged.  Osseous structures demonstrate degenerative changes.  There are calcifications of the aortic arch.                                     Assessment/Plan:     Cardiac/Vascular  * Hypertensive emergency  Patient presented with shortness of breath with 220s systolic and hyperkalema K 7. He was lethargic on physical exam. He states he missed his most recent dialysis session T (TTHS) because he was feeling unwell. He states he is complaint with blood pressure medications. No complaint of chest pain or back pain.     Plan  - started on nifedipine gtt (2.5 mcg) with improvement of BP/MAPs   - increased home coreg 6.25 to 12.5 BID   - started on home medications, hydralazine 100mg and PO nifedipine 90 mg  - nephrology consulted; appreciate recs   - HD session removing 2L     Elevated brain natriuretic peptide (BNP) level  Echo    Result Date: 1/9/2025    Left Ventricle: The left ventricle is at the upper limits of normal in   size. Increased ventricular mass. Mildly increased wall thickness. Mild   septal thickening. There is concentric remodeling. Normal wall motion.   There is normal systolic function with a visually estimated ejection   fraction of 60 -  65%. Ejection fraction is approximately 60%. There is   indeterminate diastolic function.    Right Ventricle: Normal right ventricular cavity size. Wall thickness   is normal. Systolic function is normal.    Left Atrium: Left atrium is moderately dilated.    Right Atrium: Right atrium is dilated.    Aortic Valve: The aortic valve is a trileaflet valve. There is moderate   aortic valve sclerosis. Mildly calcified right and noncoronary cusps.    Pulmonary Artery: The estimated pulmonary artery systolic pressure is   24 mmHg.    Pericardium: There is a small posterior lateral effusion amnd trivial   anteriorly and small under RA.        Patient missed   Patient with elevated BNP 2000s. Received 1 time dose of IV Lasix 80 mg in the ED. One episode of HD receiving 2L. Does not appear severely overloaded on physical exam. However still complains of shortness of breath. Bilateral leg swelling but appears to be chronic (possibly related to lymphedema)     Plan  - consider diuresis    - nephrology following; planning for another round of dialysis    - normally receives dialysis TTHS      Renal/  ESRD on hemodialysis  Patient on TTHS dialysis schedule. Missed received session on Tuesday because feeling unwell. See hypertensive emergency     Plan  - nephrology consulted; appreciate recs  - HD today, removed 2L     Hyperkalemia  Patient presented with hyperkalemia 7. Patient initially shifted with insulin, lokelma, and calcium gluconate. EKG with peaked T waves. Denies chest pain or palpitations. Received 1 dose of IV Lasix 80 mg in the ED. Got 1 session of HD removing 2L     Plan  - BNP q4 hours  - K > 7.4 > 5.2   - managing with albuterol, calcium gluconate, lokelma     Endocrine  Hyponatremia  Repleting lytes. Stable    Type 2 diabetes mellitus with stage 5 chronic kidney disease  Most recent A1c 4.1. Not currently on diabetic medication. Glucose well controlled.          Critical Care Daily Checklist:    A: Awake: RASS  Goal/Actual Goal:    Actual:     B: Spontaneous Breathing Trial Performed?     C: SAT & SBT Coordinated?  N/a                      D: Delirium: CAM-ICU     E: Early Mobility Performed? Yes   F: Feeding Goal:    Status:     Current Diet Order   Procedures    Diet Renal      AS: Analgesia/Sedation N/a   T: Thromboembolic Prophylaxis Heparin    H: HOB > 300 Yes   U: Stress Ulcer Prophylaxis (if needed) none   G: Glucose Control Within goal    B: Bowel Function  Prn    I: Indwelling Catheter (Lines & Franco) Necessity PIV    D: De-escalation of Antimicrobials/Pharmacotherapies None     Plan for the day/ETD Stable for hospital medicine; managing K and dialysis     Code Status:  Family/Goals of Care: Full Code         Critical secondary to Patient has a condition that poses threat to life and bodily function: hypertensive emergency     Critical care was time spent personally by me on the following activities: development of treatment plan with patient or surrogate and bedside caregivers, discussions with consultants, evaluation of patient's response to treatment, examination of patient, ordering and performing treatments and interventions, ordering and review of laboratory studies, ordering and review of radiographic studies, pulse oximetry, re-evaluation of patient's condition. This critical care time did not overlap with that of any other provider or involve time for any procedures.     Jean Marie Pierce MD  Critical Care Medicine  [facility] - Emergency Dept

## 2025-01-09 NOTE — ED NOTES
I-STAT Chem-8+ Results:   Value Reference Range   Sodium 123 136-145 mmol/L   Potassium  7.5 3.5-5.1 mmol/L   Chloride 97  mmol/L   Ionized Calcium 1.05 1.06-1.42 mmol/L   CO2 (measured) 20 23-29 mmol/L   Glucose 103  mg/dL    6-30 mg/dL   Creatinine 9.0 0.5-1.4 mg/dL   Hematocrit 31 36-54%

## 2025-01-09 NOTE — ED NOTES
Notified by dialysis RN that pt clammy, hard to arouse. Checked cbg and found to be 27. Will administer D50. MD notified.

## 2025-01-09 NOTE — H&P
[facility] - Emergency Dept  [facility] Medicine  History & Physical    Patient Name: Catalino Mcfadden  MRN: [id number]  Patient Class: IP- Inpatient  Admission Date: 1/8/2025  Attending Physician: Nathan Nava DO   Primary Care Provider: Chauncey Perez MD         Patient information was obtained from patient, past medical records, and ER records.     Subjective:     Principal Problem:Hyperkalemia    Chief Complaint:   Chief Complaint   Patient presents with    multiple complaints     Nausea & sore throat for several hours, due for dialysis tx tomorrow per ems.        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 Patient is lethargic on interview but states his symptoms are associated with shortness of breath and trouble swallowing. When questioned further patient states that all of these symptoms have been going on for the past 3 years. He states he missed his dialysis appoint the previous day and has been dry retching worse for the past several hours. He denies vomiting or abdominal pain. He denies chest pain, palpitations, headaches, lightheadedness, or vision changes.     While in the ED: Blood pressure up to 250s/130s. Patient lethargic falling asleep but oriented. WBC wnl. Sodium 127. K 7.7. BUN 80. Creatinine 8.0. . Bilirubin 2.2. BNP 2358. Trop negative. EKG with peaking T waves. CXR with chronic interstitial prominence. Patient given calcium gluconate and shifted for hyperkalemia. Nephrology consulted and patient received dialysis in the ED.    Past Medical History:   Diagnosis Date    Cancer     Diabetes mellitus, type 2     Diabetic foot ulcer associated with diabetes  mellitus due to underlying condition 07/16/2020    ESRD on hemodialysis     Hyperlipidemia     Hypertension     Obese        Past Surgical History:   Procedure Laterality Date    AV FISTULA PLACEMENT Left 07/06/2023    Procedure: CREATION, AV FISTULA;  Surgeon: Daniel Poon MD;  Location: [facility] OR;  Service: Vascular;  Laterality: Left;  RN PREOP 6/28/2023  LABS DAY OF SURGERY    BONE BIOPSY Left 07/17/2020    Procedure: BIOPSY, BONE;  Surgeon: Verona Whitmore DPM;  Location: [facility] OR;  Service: Podiatry;  Laterality: Left;    CERVICAL DISC SURGERY  07/11/2016    COLONOSCOPY N/A 3/25/2024    Procedure: COLONOSCOPY;  Surgeon: Roopa Pérez MD;  Location: [facility] (4TH FLR);  Service: Endoscopy;  Laterality: N/A;  Ref By: Dr. Pérez   constipation prep  Diaylsis Patient Lab has been ordered  3/20-precall complete-MS    COLONOSCOPY N/A 4/8/2024    Procedure: COLONOSCOPY;  Surgeon: Roopa Pérez MD;  Location: [facility] (2ND FLR);  Service: Endoscopy;  Laterality: N/A;  Prep instructions sent via portal/given to pt in clinic  pt had to be r/s at  request  Dialysis Tues,Thurs,Sat-dw  Peg Prep-dw  4/2/24- Labs - dialysis /poor prep on 3/26 - PEG x 2 - extended prep /LVM for precall - ERW  4/5-LVM for precall-KPvt    COLONOSCOPY N/A 4/16/2024    Procedure: COLONOSCOPY;  Surgeon: Jraett Hill MD;  Location: [facility] ENDO (2ND FLR);  Service: Endoscopy;  Laterality: N/A;    DEBRIDEMENT Left 07/17/2020    Procedure: DEBRIDEMENT;  Surgeon: Verona Whitmore DPM;  Location: [facility] OR;  Service: Podiatry;  Laterality: Left;    DEBRIDEMENT OF FOOT Right     toes & plantar surface    ENDOBRONCHIAL ULTRASOUND N/A 3/1/2024    Procedure: ENDOBRONCHIAL ULTRASOUND (EBUS);  Surgeon: Francisco Fleming MD;  Location: [facility] 2ND FLR;  Service: Pulmonary;  Laterality: N/A;    ENDOSCOPIC MUCOSAL RESECTION OF COLON N/A 9/9/2024    Procedure: RESECTION, MUCOSA, COLON, ENDOSCOPIC;  Surgeon: Vikas Quinonez MD;   Location: [facility] (2ND FLR);  Service: Endoscopy;  Laterality: N/A;  6/11 portal-peg-dialysis T Th Sat-+K scheduled-.colonoscopy in 3 months with [facility] doc, [facility], removal of TI polyp and assess prior EMR sites. Quinonez-tt  7/19/24: rescheduled instructions sent via portal-GD  9/3-lvm for pre call-tb  9/4-pre call complete-    ESOPHAGEAL MANOMETRY WITH MEASUREMENT OF IMPEDANCE N/A 03/27/2023    Procedure: MANOMETRY, ESOPHAGUS, WITH IMPEDANCE MEASUREMENT;  Surgeon: Roopa Pérez MD;  Location: [facility] (4TH FLR);  Service: Endoscopy;  Laterality: N/A;  Belching and rumination protocol please  instructions via portal -     ESOPHAGOGASTRODUODENOSCOPY N/A 12/16/2022    Procedure: EGD (ESOPHAGOGASTRODUODENOSCOPY);  Surgeon: Eren Francois MD;  Location: [facility];  Service: Endoscopy;  Laterality: N/A;  Pt. on Dialysis. K+ ordered prior to procedure.EC    ESOPHAGOGASTRODUODENOSCOPY N/A 12/5/2023    Procedure: EGD (ESOPHAGOGASTRODUODENOSCOPY);  Surgeon: Kash Johnston MD;  Location: [facility];  Service: Endoscopy;  Laterality: N/A;    FRACTURE SURGERY Right     medial ankle, metal plate present    INJECTION OF ANESTHETIC AGENT AROUND NERVE Right 2/2/2024    Procedure: BLOCK, NERVE STELLATE GANGLION *HOLD ASPIRIN 5 DAYS PRIOR*;  Surgeon: Gokul Gonzalez MD;  Location: [facility] MGT;  Service: Pain Management;  Laterality: Right;  [phone]    INSERTION OF TUNNELED CENTRAL VENOUS CATHETER (CVC) WITH SUBCUTANEOUS PORT N/A 06/11/2021    Procedure: TUNNEL CATH INSERTION WITHOUT PORT;  Surgeon: Dosc Diagnostic Provider;  Location: [facility] OR;  Service: Radiology;  Laterality: N/A;  11AM START---PHONE PREOP 6/10/21---COVID POSTIVE ON 7/2020---NO S/S    left leg orthopedic surgery Left     MUSCLE BIOPSY Left 11/4/2024    Procedure: BIOPSY, MUSCLE;  Surgeon: Adithya Lewis MD;  Location: [facility] OR;  Service: Neurosurgery;  Laterality: Left;  Left quadriceps muscle biopsy, will need standard pathology protocol for  muscle sample. MAC anesthetic with local anesthetic injection    RN PREOP 10/28/2024    REVISION OF ARTERIOVENOUS FISTULA Left 3/14/2024    Procedure: REVISION, AV FISTULA;  Surgeon: Daniel Poon MD;  Location: [facility];  Service: Vascular;  Laterality: Left;  RN preop 3/6/2024----NEED ORDERS    UPPER GASTROINTESTINAL ENDOSCOPY         Review of patient's allergies indicates:  No Known Allergies    No current facility-administered medications on file prior to encounter.     Current Outpatient Medications on File Prior to Encounter   Medication Sig    carvediloL (COREG) 6.25 MG tablet Take 1 tablet (6.25 mg total) by mouth 2 (two) times daily with meals.    chlorproMAZINE (THORAZINE) 25 MG tablet Take 1-2 tablets (25-50 mg total) by mouth 3 (three) times daily. For hiccups. Can adjust dose for sleepiness    gabapentin (NEURONTIN) 100 MG capsule Take 1 capsule (100 mg total) by mouth 3 (three) times daily.    hydrALAZINE (APRESOLINE) 100 MG tablet Take 1 tablet (100 mg total) by mouth 3 (three) times daily.    NIFEdipine (PROCARDIA XL) 90 MG (OSM) 24 hr tablet Take 1 tablet (90 mg total) by mouth once daily.    senna-docusate 8.6-50 mg (PERICOLACE) 8.6-50 mg per tablet Take 2 tablets by mouth once daily.     Family History       Problem Relation (Age of Onset)    Heart disease Father          Tobacco Use    Smoking status: Never    Smokeless tobacco: Never   Substance and Sexual Activity    Alcohol use: Not Currently     Comment: occ rare beer    Drug use: No    Sexual activity: Not Currently     Review of Systems   Constitutional:  Positive for fatigue. Negative for activity change, chills and fever.   Respiratory:  Positive for choking and shortness of breath. Negative for chest tightness.    Cardiovascular:  Negative for chest pain, palpitations and leg swelling.   Gastrointestinal:  Positive for nausea. Negative for abdominal pain, constipation, diarrhea and vomiting.   Genitourinary:  Negative for dysuria  and frequency.   Neurological:  Positive for weakness. Negative for dizziness and light-headedness.   Psychiatric/Behavioral:  Positive for decreased concentration.      Objective:     Vital Signs (Most Recent):  Temp: 98 °F (36.7 °C) (01/09/25 0424)  Pulse: 74 (01/09/25 0445)  Resp: 16 (01/09/25 0445)  BP: (!) 194/95 (01/09/25 0445)  SpO2: 98 % (01/09/25 0445) Vital Signs (24h Range):  Temp:  [97.9 °F (36.6 °C)-98 °F (36.7 °C)] 98 °F (36.7 °C)  Pulse:  [] 74  Resp:  [16-28] 16  SpO2:  [92 %-99 %] 98 %  BP: (194-256)/() 194/95     Weight: 86.2 kg (190 lb)  Body mass index is 25.07 kg/m².     Physical Exam  Constitutional:       General: He is not in acute distress.     Appearance: He is ill-appearing.      Comments: Patient lethargic and frequently falls asleep during exam.  Hiccupping    Eyes:      Extraocular Movements: Extraocular movements intact.      Conjunctiva/sclera: Conjunctivae normal.   Cardiovascular:      Rate and Rhythm: Normal rate and regular rhythm.      Pulses: Normal pulses.      Heart sounds: Normal heart sounds. No murmur heard.  Pulmonary:      Effort: Pulmonary effort is normal. No respiratory distress.      Breath sounds: Normal breath sounds. No wheezing, rhonchi or rales.   Abdominal:      General: Abdomen is flat. Bowel sounds are normal. There is no distension.      Palpations: Abdomen is soft.      Tenderness: There is no abdominal tenderness.   Musculoskeletal:      Right lower leg: No edema.      Left lower leg: No edema.      Comments: Fistula with palpable thrill in left UE.   Skin:     General: Skin is warm.   Neurological:      Mental Status: He is oriented to person, place, and time.   Psychiatric:         Mood and Affect: Mood normal.         Behavior: Behavior normal.                Significant Labs: All pertinent labs within the past 24 hours have been reviewed.    Significant Imaging: I have reviewed all pertinent imaging results/findings within the past 24  hours.  Assessment/Plan:     * Hyperkalemia  K 7.7 on admission. After missing 1 session of dialysis. EKG with peaking T waves. Patient given calcium gluconate and shifted for hyperkalemia. Nephrology consulted and patient received dialysis in the ED.    Plan  - Receiving dialysis in ED  - BMP every 4 hours  - Monitor for arrhythmias with EKG and/or continuous telemetry.   - Treat the hyperkalemia with Potassium Binders, Calcium gluconate, IV insulin and dextrose, and Furosemide.             Elevated brain natriuretic peptide (BNP) level  Patient with hypertensive emergency with BNP of >2300. Not on oxygen. No history of heart failure. Last echo in 2021.  -F/u repeat echo  -Control of hypertension with dialysis and home meds      ESRD on hemodialysis  Creatine stable for now. BMP reviewed- noted Estimated Creatinine Clearance: 11.1 mL/min (A) (based on SCr of 8 mg/dL (H)). according to latest data. Based on current GFR, CKD stage is end stage.  Still makes urine.     -Nephrology consulted for dialysis   -Monitor UOP and serial BMP and adjust therapy as needed.   -Renally dose meds. Avoid nephrotoxic medications and procedures.    Hypertensive emergency  Patient has a current diagnosis of Hypertensive emergency with end organ damage evidenced by Elevated BNP    Temp:  [97.9 °F (36.6 °C)-98 °F (36.7 °C)]   Pulse:  []   Resp:  [14-28]   BP: (194-256)/()   SpO2:  [92 %-99 %] .     Hypertension Medications               carvediloL (COREG) 6.25 MG tablet Take 1 tablet (6.25 mg total) by mouth 2 (two) times daily with meals.    hydrALAZINE (APRESOLINE) 100 MG tablet Take 1 tablet (100 mg total) by mouth 3 (three) times daily.    NIFEdipine (PROCARDIA XL) 90 MG (OSM) 24 hr tablet Take 1 tablet (90 mg total) by mouth once daily.        -Monitor for improvement following dialysis    Intractable hiccups  Chronic condition.  -Continue home prn thorazine    Hyponatremia  Hyponatremia is likely due to renal  insufficiency. The patient's most recent sodium results are listed below.  Recent Labs     01/08/25  2253   *     Plan  - Getting dialysis  - BMP q4h    Type 2 diabetes mellitus with stage 5 chronic kidney disease  Lab Results   Component Value Date    HGBA1C 4.1 05/06/2024     Recent Labs     01/09/25  0010 01/09/25  0142 01/09/25  0213 01/09/25  0233 01/09/25  0433   POCTGLUCOSE 62* 100 219* 192* 27*     Antihyperglycemics (From admission, onward)      Start     Stop Route Frequency Ordered    01/09/25 0457  insulin aspart U-100 pen 0-5 Units         -- SubQ Before meals & nightly PRN 01/09/25 0358        - LDSSI  - renal diet  - POCT glucose checks TIDWM and qhs  - goal glucose 140-180        VTE Risk Mitigation (From admission, onward)           Ordered     heparin (porcine) injection 5,000 Units  Every 8 hours         01/09/25 0358     IP VTE HIGH RISK PATIENT  Once         01/09/25 0358     Place sequential compression device  Until discontinued         01/09/25 0358                                  01/09/25 0435   Pre-Hemodialysis Assessment   Treatment Status Started   Gross Bleach Negative Yes   Machine Number K41   Alarms Verified Yes   pH 7.2   Machine Temperature 96.6 °F (35.9 °C)   Dialyzer F-160   Machine Conductivity 13.8   Meter Conductivity 13.8   Dialysate Na (mEq/L) 135   Dialysate K (mEq/L) 2   Dialysate CA (mEq/L) 2.5   Dialysate HCO3 (mEq/L) 35   Net UF Goal 2000   Total UF Goal 2500   UF Rate 830   During Hemodialysis Assessment   Blood Flow Rate (mL/min) 400 mL/min   Dialysate Flow Rate (mL/min) 700 ml/min   Ultrafiltration Rate (mL/Hr) 830 mL/Hr   Arteriovenous Lines Secure Yes   Arterial Pressure (mmHg) 50 mmHg   Venous Pressure (mmHg) 190   TMP 50   Venous Line in Air Detector Yes   Intake (mL) 250 mL   Intra-Hemodialysis Comments HD INITIATED     Hd initiated per md order, accessed via LIZET AVF without difficulty, lines secured    Ezekiel Garcia DO  [facility]  Medicine  [facility] - Emergency Dept

## 2025-01-09 NOTE — PROGRESS NOTES
01/09/25 0435   Pre-Hemodialysis Assessment   Treatment Status Started   Gross Bleach Negative Yes   Machine Number K41   Alarms Verified Yes   pH 7.2   Machine Temperature 96.6 °F (35.9 °C)   Dialyzer F-160   Machine Conductivity 13.8   Meter Conductivity 13.8   Dialysate Na (mEq/L) 135   Dialysate K (mEq/L) 2   Dialysate CA (mEq/L) 2.5   Dialysate HCO3 (mEq/L) 35   Net UF Goal 2000   Total UF Goal 2500   UF Rate 830   During Hemodialysis Assessment   Blood Flow Rate (mL/min) 400 mL/min   Dialysate Flow Rate (mL/min) 700 ml/min   Ultrafiltration Rate (mL/Hr) 830 mL/Hr   Arteriovenous Lines Secure Yes   Arterial Pressure (mmHg) 50 mmHg   Venous Pressure (mmHg) 190   TMP 50   Venous Line in Air Detector Yes   Intake (mL) 250 mL   Intra-Hemodialysis Comments HD INITIATED     Hd initiated per md order, accessed via LIZET AVF without difficulty, lines secured

## 2025-01-10 VITALS
DIASTOLIC BLOOD PRESSURE: 86 MMHG | HEART RATE: 80 BPM | SYSTOLIC BLOOD PRESSURE: 187 MMHG | HEIGHT: 73 IN | BODY MASS INDEX: 25.18 KG/M2 | RESPIRATION RATE: 20 BRPM | TEMPERATURE: 99 F | WEIGHT: 190 LBS | OXYGEN SATURATION: 100 %

## 2025-01-10 LAB
ALBUMIN SERPL BCP-MCNC: 2.7 G/DL (ref 3.5–5.2)
ALP SERPL-CCNC: 532 U/L (ref 40–150)
ALT SERPL W/O P-5'-P-CCNC: 34 U/L (ref 10–44)
ANION GAP SERPL CALC-SCNC: 12 MMOL/L (ref 8–16)
ANION GAP SERPL CALC-SCNC: 12 MMOL/L (ref 8–16)
ANION GAP SERPL CALC-SCNC: 15 MMOL/L (ref 8–16)
ANION GAP SERPL CALC-SCNC: 15 MMOL/L (ref 8–16)
ANION GAP SERPL CALC-SCNC: 9 MMOL/L (ref 8–16)
AST SERPL-CCNC: 42 U/L (ref 10–40)
BASOPHILS # BLD AUTO: 0.03 K/UL (ref 0–0.2)
BASOPHILS NFR BLD: 0.6 % (ref 0–1.9)
BILIRUB SERPL-MCNC: 1.9 MG/DL (ref 0.1–1)
BUN SERPL-MCNC: 27 MG/DL (ref 6–20)
BUN SERPL-MCNC: 49 MG/DL (ref 6–20)
BUN SERPL-MCNC: 51 MG/DL (ref 6–20)
BUN SERPL-MCNC: 51 MG/DL (ref 6–20)
BUN SERPL-MCNC: 54 MG/DL (ref 6–20)
CALCIUM SERPL-MCNC: 8.4 MG/DL (ref 8.7–10.5)
CALCIUM SERPL-MCNC: 8.8 MG/DL (ref 8.7–10.5)
CALCIUM SERPL-MCNC: 9 MG/DL (ref 8.7–10.5)
CALCIUM SERPL-MCNC: 9 MG/DL (ref 8.7–10.5)
CALCIUM SERPL-MCNC: 9.1 MG/DL (ref 8.7–10.5)
CHLORIDE SERPL-SCNC: 95 MMOL/L (ref 95–110)
CHLORIDE SERPL-SCNC: 96 MMOL/L (ref 95–110)
CHLORIDE SERPL-SCNC: 97 MMOL/L (ref 95–110)
CO2 SERPL-SCNC: 18 MMOL/L (ref 23–29)
CO2 SERPL-SCNC: 20 MMOL/L (ref 23–29)
CO2 SERPL-SCNC: 24 MMOL/L (ref 23–29)
CREAT SERPL-MCNC: 3.6 MG/DL (ref 0.5–1.4)
CREAT SERPL-MCNC: 5.6 MG/DL (ref 0.5–1.4)
CREAT SERPL-MCNC: 5.7 MG/DL (ref 0.5–1.4)
CREAT SERPL-MCNC: 5.7 MG/DL (ref 0.5–1.4)
CREAT SERPL-MCNC: 5.8 MG/DL (ref 0.5–1.4)
DIFFERENTIAL METHOD BLD: ABNORMAL
EOSINOPHIL # BLD AUTO: 0.1 K/UL (ref 0–0.5)
EOSINOPHIL NFR BLD: 2.8 % (ref 0–8)
ERYTHROCYTE [DISTWIDTH] IN BLOOD BY AUTOMATED COUNT: 15.4 % (ref 11.5–14.5)
EST. GFR  (NO RACE VARIABLE): 10.5 ML/MIN/1.73 M^2
EST. GFR  (NO RACE VARIABLE): 10.7 ML/MIN/1.73 M^2
EST. GFR  (NO RACE VARIABLE): 10.7 ML/MIN/1.73 M^2
EST. GFR  (NO RACE VARIABLE): 10.9 ML/MIN/1.73 M^2
EST. GFR  (NO RACE VARIABLE): 18.5 ML/MIN/1.73 M^2
GLUCOSE SERPL-MCNC: 101 MG/DL (ref 70–110)
GLUCOSE SERPL-MCNC: 75 MG/DL (ref 70–110)
GLUCOSE SERPL-MCNC: 90 MG/DL (ref 70–110)
GLUCOSE SERPL-MCNC: 90 MG/DL (ref 70–110)
GLUCOSE SERPL-MCNC: 97 MG/DL (ref 70–110)
HCT VFR BLD AUTO: 29.2 % (ref 40–54)
HGB BLD-MCNC: 9.8 G/DL (ref 14–18)
IMM GRANULOCYTES # BLD AUTO: 0.01 K/UL (ref 0–0.04)
IMM GRANULOCYTES NFR BLD AUTO: 0.2 % (ref 0–0.5)
LYMPHOCYTES # BLD AUTO: 0.5 K/UL (ref 1–4.8)
LYMPHOCYTES NFR BLD: 10.7 % (ref 18–48)
MAGNESIUM SERPL-MCNC: 2 MG/DL (ref 1.6–2.6)
MCH RBC QN AUTO: 29.1 PG (ref 27–31)
MCHC RBC AUTO-ENTMCNC: 33.6 G/DL (ref 32–36)
MCV RBC AUTO: 87 FL (ref 82–98)
MONOCYTES # BLD AUTO: 0.6 K/UL (ref 0.3–1)
MONOCYTES NFR BLD: 11.9 % (ref 4–15)
NEUTROPHILS # BLD AUTO: 3.5 K/UL (ref 1.8–7.7)
NEUTROPHILS NFR BLD: 73.8 % (ref 38–73)
NRBC BLD-RTO: 0 /100 WBC
PHOSPHATE SERPL-MCNC: 7.6 MG/DL (ref 2.7–4.5)
PLATELET # BLD AUTO: 159 K/UL (ref 150–450)
PMV BLD AUTO: 9.6 FL (ref 9.2–12.9)
POCT GLUCOSE: 71 MG/DL (ref 70–110)
POCT GLUCOSE: 87 MG/DL (ref 70–110)
POTASSIUM SERPL-SCNC: 3.9 MMOL/L (ref 3.5–5.1)
POTASSIUM SERPL-SCNC: 5.5 MMOL/L (ref 3.5–5.1)
POTASSIUM SERPL-SCNC: 5.5 MMOL/L (ref 3.5–5.1)
POTASSIUM SERPL-SCNC: 5.6 MMOL/L (ref 3.5–5.1)
POTASSIUM SERPL-SCNC: 5.9 MMOL/L (ref 3.5–5.1)
PROT SERPL-MCNC: 7.2 G/DL (ref 6–8.4)
RBC # BLD AUTO: 3.37 M/UL (ref 4.6–6.2)
SODIUM SERPL-SCNC: 126 MMOL/L (ref 136–145)
SODIUM SERPL-SCNC: 128 MMOL/L (ref 136–145)
SODIUM SERPL-SCNC: 129 MMOL/L (ref 136–145)
SODIUM SERPL-SCNC: 130 MMOL/L (ref 136–145)
SODIUM SERPL-SCNC: 130 MMOL/L (ref 136–145)
WBC # BLD AUTO: 4.69 K/UL (ref 3.9–12.7)

## 2025-01-10 PROCEDURE — 84100 ASSAY OF PHOSPHORUS: CPT

## 2025-01-10 PROCEDURE — 63600175 PHARM REV CODE 636 W HCPCS

## 2025-01-10 PROCEDURE — 25000003 PHARM REV CODE 250

## 2025-01-10 PROCEDURE — 85025 COMPLETE CBC W/AUTO DIFF WBC: CPT

## 2025-01-10 PROCEDURE — 83735 ASSAY OF MAGNESIUM: CPT

## 2025-01-10 PROCEDURE — 94640 AIRWAY INHALATION TREATMENT: CPT

## 2025-01-10 PROCEDURE — 94761 N-INVAS EAR/PLS OXIMETRY MLT: CPT

## 2025-01-10 PROCEDURE — 80048 BASIC METABOLIC PNL TOTAL CA: CPT | Mod: 91,XB

## 2025-01-10 PROCEDURE — 80100014 HC HEMODIALYSIS 1:1

## 2025-01-10 PROCEDURE — 80053 COMPREHEN METABOLIC PANEL: CPT

## 2025-01-10 PROCEDURE — 25000242 PHARM REV CODE 250 ALT 637 W/ HCPCS

## 2025-01-10 PROCEDURE — 99232 SBSQ HOSP IP/OBS MODERATE 35: CPT | Mod: ,,, | Performed by: INTERNAL MEDICINE

## 2025-01-10 RX ORDER — CARVEDILOL 12.5 MG/1
12.5 TABLET ORAL 2 TIMES DAILY
Qty: 60 TABLET | Refills: 5 | Status: SHIPPED | OUTPATIENT
Start: 2025-01-10 | End: 2025-07-09

## 2025-01-10 RX ORDER — POLYETHYLENE GLYCOL 3350 17 G/17G
17 POWDER, FOR SOLUTION ORAL DAILY
Status: DISCONTINUED | OUTPATIENT
Start: 2025-01-10 | End: 2025-01-10 | Stop reason: HOSPADM

## 2025-01-10 RX ORDER — SODIUM BICARBONATE 650 MG/1
1300 TABLET ORAL 2 TIMES DAILY
Status: DISCONTINUED | OUTPATIENT
Start: 2025-01-10 | End: 2025-01-10 | Stop reason: HOSPADM

## 2025-01-10 RX ORDER — ALBUTEROL SULFATE 2.5 MG/.5ML
10 SOLUTION RESPIRATORY (INHALATION) ONCE
Status: COMPLETED | OUTPATIENT
Start: 2025-01-10 | End: 2025-01-10

## 2025-01-10 RX ORDER — AMOXICILLIN 250 MG
2 CAPSULE ORAL DAILY PRN
COMMUNITY
Start: 2025-01-10

## 2025-01-10 RX ADMIN — CARVEDILOL 12.5 MG: 12.5 TABLET, FILM COATED ORAL at 10:01

## 2025-01-10 RX ADMIN — ALBUTEROL SULFATE 10 MG: 2.5 SOLUTION RESPIRATORY (INHALATION) at 01:01

## 2025-01-10 RX ADMIN — SODIUM ZIRCONIUM CYCLOSILICATE 10 G: 10 POWDER, FOR SUSPENSION ORAL at 01:01

## 2025-01-10 RX ADMIN — SODIUM BICARBONATE 650 MG TABLET 1300 MG: at 10:01

## 2025-01-10 RX ADMIN — HEPARIN SODIUM 5000 UNITS: 5000 INJECTION INTRAVENOUS; SUBCUTANEOUS at 05:01

## 2025-01-10 RX ADMIN — HYDRALAZINE HYDROCHLORIDE 100 MG: 25 TABLET ORAL at 10:01

## 2025-01-10 RX ADMIN — NIFEDIPINE 90 MG: 30 TABLET, FILM COATED, EXTENDED RELEASE ORAL at 10:01

## 2025-01-10 NOTE — ED NOTES
Patient identifiers verified and correct for Catalino Mcfadden   LOC: The patient is aao x4  APPEARANCE: Patient appears comfortable and in no acute distress  SKIN: The skin is warm and dry, color consistent with ethnicity, patient has normal skin turgor and moist mucus membranes, skin intact  MUSCULOSKELETAL: Patient moving all extremities spontaneously  RESPIRATORY: Airway is open and patent, respirations are spontaneous, patient has a normal effort and rate, no accessory muscle use noted, pt placed on continuous pulse ox with O2 sats noted at 99% on room air.  CARDIAC: Pt placed on cardiac monitor. Patient has a normal rate, capillary refill < 3 seconds.   GASTRO: Soft and non tender to palpation, no distention noted  : Pt denies any pain or frequency with urination. +pure wick   NEURO: Pt opens eyes spontaneously, behavior appropriate to situation, follows commands, facial expression symmetrical, bilateral hand grasp equal and even, purposeful motor response noted, normal sensation in all extremities when touched with a finger.

## 2025-01-10 NOTE — PLAN OF CARE
[facility] - Emergency Dept  Initial Discharge Assessment       Primary Care Provider: Chauncey Perez MD    Admission Diagnosis: Hyperkalemia [E87.5]  Weakness [R53.1]  Hypertensive emergency [I16.1]    Admission Date: 1/8/2025  Expected Discharge Date: 1/10/2025    Transition of Care Barriers: (P) None    Payor: /     Extended Emergency Contact Information  Primary Emergency Contact: Víctor Mcfadden  Mobile Phone: [phone]  Relation: Sister   needed? No    Discharge Plan A: (P) Home  Discharge Plan B: (P) Home      [id number] - [place] - [address] [facility] [place]  [address]  [facility] [address]  Phone: [phone] Fax: [phone]    [facility] [address]  [address]   [place] [address]  Phone: [phone] Fax: [phone]    [id number] - [place], [place] - [address] [place] [address] EXPY AT [facility] OF [facility] & [address] [place]PY  [place] [address]  Phone: [phone] Fax: [phone]    [facility] [place], [place] - [address] [place] [address] mail services  [facility]  Mail Order pharmacy  [facility] access hospital [address]  Phone: [phone] Fax: [phone]      Initial Assessment (most recent)       Adult Discharge Assessment - 01/10/25 1415          Discharge Assessment    Assessment Type Discharge Planning Assessment (P)      Confirmed/corrected address, phone number and insurance Yes (P)      Confirmed Demographics Correct on Facesheet (P)      Source of Information patient;health record (P)      Communicated WILMER with patient/caregiver Yes (P)      People in Home alone (P)      Do you expect to return to your current living situation? Yes (P)      Do you have help at home or someone to help you manage your care at home? No (P)      Prior to hospitilization cognitive status: Alert/Oriented (P)      Current cognitive status: Alert/Oriented (P)      Equipment Currently Used  at Home cane, straight (P)      Readmission within 30 days? No (P)      Patient currently being followed by outpatient case management? No (P)      Do you currently have service(s) that help you manage your care at home? No (P)      Do you take prescription medications? Yes (P)      Do you have prescription coverage? Yes (P)      Do you have any problems affording any of your prescribed medications? No (P)      Is the patient taking medications as prescribed? yes (P)      How do you get to doctors appointments? family or friend will provide;car, drives self (P)      Are you on dialysis? Yes (P)      Dialysis Name and Scheduled days [facility] TTS days (P)      Discharge Plan A Home (P)      Discharge Plan B Home (P)      DME Needed Upon Discharge  none (P)      Discharge Plan discussed with: Patient (P)      Transition of Care Barriers None (P)         Physical Activity    On average, how many days per week do you engage in moderate to strenuous exercise (like a brisk walk)? 0 days (P)         Financial Resource Strain    How hard is it for you to pay for the very basics like food, housing, medical care, and heating? Not very hard (P)         Housing Stability    In the last 12 months, was there a time when you were not able to pay the mortgage or rent on time? No (P)      At any time in the past 12 months, were you homeless or living in a shelter (including now)? No (P)         Transportation Needs    Has the lack of transportation kept you from medical appointments, meetings, work or from getting things needed for daily living? No (P)         Food Insecurity    Within the past 12 months, you worried that your food would run out before you got the money to buy more. Never true (P)         Stress    Do you feel stress - tense, restless, nervous, or anxious, or unable to sleep at night because your mind is troubled all the time - these days? Only a little (P)         Social Isolation    How often do you feel  lonely or isolated from those around you?  Sometimes (P)         Alcohol Use    Q1: How often do you have a drink containing alcohol? Patient declined (P)         Utilities    In the past 12 months has the electric, gas, oil, or water company threatened to shut off services in your home? No (P)         Health Literacy    How often do you need to have someone help you when you read instructions, pamphlets, or other written material from your doctor or pharmacy? Rarely (P)

## 2025-01-10 NOTE — ED NOTES
Pt provided with waffle mattress for comfort and prevention of pressure injury,denies any pain or discomfort at this time.

## 2025-01-10 NOTE — PROGRESS NOTES
[facility]  Nephrology  Progress Note    Patient Name: Catalino Mcfadden  MRN: [id number]  Admission Date: 1/8/2025  Hospital Length of Stay: 1 days  Attending Provider: Nathan Nava DO   Primary Care Physician: Chauncey Perez MD  Principal Problem:Hypertensive emergency    Subjective:     HPI: Catalino is a pleasant 58 yo man w pmhx significant for T2DM, ESRD HD TThS, HTN, HLD who is being admitted to the hospital after he presented with complaints of shortness of breath, headache, diarrhea, and nausea. His last HD session was 1/4/25 (Sat), he missed his session on Tuesday because he didn't feel well. At time of interview he appeared lethargic and kept dozing off, but was able to awaken to loud verbal stimuli. His POC K was elevated at 8.5, repeat after shifting (insulin d50 lokelma lasix) was 7.5. Labs showed K 7.7. No obvious EKG changes. Na 127, BUN 80, on room air no resp distress. BP elevated into the 220s systolic. Nephrology was consulted for emergent HD for hyperkalemia.     Interval hx: No overnight events. HD started early this morning. He feels well, sitting in his bed eating breakfast.     Objective:     Vital Signs (Most Recent):  Temp: 98 °F (36.7 °C) (01/10/25 0330)  Pulse: 78 (01/10/25 0900)  Resp: 17 (01/10/25 0900)  BP: (!) 167/81 (01/10/25 0900)  SpO2: 100 % (01/10/25 0900) Vital Signs (24h Range):  Temp:  [98 °F (36.7 °C)-98.9 °F (37.2 °C)] 98 °F (36.7 °C)  Pulse:  [76-91] 78  Resp:  [12-21] 17  SpO2:  [95 %-100 %] 100 %  BP: (118-180)/(63-82) 167/81     Weight: 86.2 kg (190 lb) (01/08/25 2118)  Body mass index is 25.07 kg/m².  Body surface area is 2.11 meters squared.    I/O last 3 completed shifts:  In: -   Out: 3400 [Urine:900; Other:2500]     Physical Exam  Constitutional:       General: He is not in acute distress.     Appearance: Normal appearance. He is not ill-appearing.      Comments: Mentation improved   Cardiovascular:      Rate and Rhythm: Normal rate.      Pulses: Normal  pulses.      Heart sounds: No murmur heard.  Pulmonary:      Effort: Pulmonary effort is normal.   Abdominal:      General: Abdomen is flat.      Palpations: Abdomen is soft.   Musculoskeletal:      Right lower leg: No edema.      Left lower leg: No edema.   Skin:     General: Skin is warm.      Comments: SANGEETHA AVF   Neurological:      General: No focal deficit present.      Mental Status: He is alert and oriented to person, place, and time. Mental status is at baseline.          Significant Labs:  CBC:   Recent Labs   Lab 01/10/25  0341   WBC 4.69   RBC 3.37*   HGB 9.8*   HCT 29.2*      MCV 87   MCH 29.1   MCHC 33.6     CMP:   Recent Labs   Lab 01/10/25  0341   GLU 90  90   CALCIUM 9.0  9.0   ALBUMIN 2.7*   PROT 7.2   *  130*   K 5.5*  5.5*   CO2 18*  18*   CL 97  97   BUN 51*  51*   CREATININE 5.7*  5.7*   ALKPHOS 532*   ALT 34   AST 42*   BILITOT 1.9*     All labs within the past 24 hours have been reviewed.  Assessment/Plan:   Hyperkalemia - improved  Hyponatremia  End stage renal disease on HD TThS  Metabolic acidosis  Hypertensive urgency/emergency  T2DM     Outpatient HD Information:  -Dialysis modality: Hemodialysis  -Outpatient HD unit: [facility], [place], operated by [facility]  -Nephrologist: ?  -HD TX days: Tuesday/Thursday/Saturday, duration of treatment: 4 hrs  -Last HD TX prior to hospital admission: 1/4/25  -Dialysis access: LUE AV fistula   -Residual urine: Minium  -EDW: ?     -HD to be completed this am, 2.5 L UF. If BP stable, okay to go home from nephro perspective and attend outpatient HD tomorrow (Sat).   -dialysis thrice weekly TThS unless more urgent indication arises  -pth stable at 104.   -hg at goal 10-11  -avoid gadolinium, fleets, phos based laxatives, nsaids  -cardene being titrated off per icu    Thank you for your consult. I will follow-up with patient. Please contact us if you have any additional questions.    Dutch Gama MD  Nephrology  [facility]    ATTENDING  PHYSICIAN ATTESTATION  I have personally verified the history and examined the patient. I thoroughly reviewed the demographic, clinical, laboratorial and imaging information available in medical records. I agree with the assessment and recommendations provided by the subspecialty resident who was under my supervision.

## 2025-01-10 NOTE — PROGRESS NOTES
HD initiated, cannulated upper left arm avf with 15 gauge needles. /, uf net goal set for 2 liters as tolerated.. patient awake and alert.

## 2025-01-10 NOTE — PLAN OF CARE
D/c plan:    Plan of care reviewed and discussed  Eager to d/c today  Resume HD schedule (TTS) at [facility] to follow as needed  Esdras approved from charge nurse

## 2025-01-10 NOTE — SUBJECTIVE & OBJECTIVE
Interval hx: No overnight events. HD started early this morning. He feels well, sitting in his bed eating breakfast.     Objective:     Vital Signs (Most Recent):  Temp: 98 °F (36.7 °C) (01/10/25 0330)  Pulse: 78 (01/10/25 0900)  Resp: 17 (01/10/25 0900)  BP: (!) 167/81 (01/10/25 0900)  SpO2: 100 % (01/10/25 0900) Vital Signs (24h Range):  Temp:  [98 °F (36.7 °C)-98.9 °F (37.2 °C)] 98 °F (36.7 °C)  Pulse:  [76-91] 78  Resp:  [12-21] 17  SpO2:  [95 %-100 %] 100 %  BP: (118-180)/(63-82) 167/81     Weight: 86.2 kg (190 lb) (01/08/25 2118)  Body mass index is 25.07 kg/m².  Body surface area is 2.11 meters squared.    I/O last 3 completed shifts:  In: -   Out: 3400 [Urine:900; Other:2500]     Physical Exam  Constitutional:       General: He is not in acute distress.     Appearance: Normal appearance. He is not ill-appearing.      Comments: Mentation improved   Cardiovascular:      Rate and Rhythm: Normal rate.      Pulses: Normal pulses.      Heart sounds: No murmur heard.  Pulmonary:      Effort: Pulmonary effort is normal.   Abdominal:      General: Abdomen is flat.      Palpations: Abdomen is soft.   Musculoskeletal:      Right lower leg: No edema.      Left lower leg: No edema.   Skin:     General: Skin is warm.      Comments: SANGEETHA ABRAMS   Neurological:      General: No focal deficit present.      Mental Status: He is alert and oriented to person, place, and time. Mental status is at baseline.          Significant Labs:  CBC:   Recent Labs   Lab 01/10/25  0341   WBC 4.69   RBC 3.37*   HGB 9.8*   HCT 29.2*      MCV 87   MCH 29.1   MCHC 33.6     CMP:   Recent Labs   Lab 01/10/25  0341   GLU 90  90   CALCIUM 9.0  9.0   ALBUMIN 2.7*   PROT 7.2   *  130*   K 5.5*  5.5*   CO2 18*  18*   CL 97  97   BUN 51*  51*   CREATININE 5.7*  5.7*   ALKPHOS 532*   ALT 34   AST 42*   BILITOT 1.9*     All labs within the past 24 hours have been reviewed.

## 2025-01-10 NOTE — PROGRESS NOTES
HD completed, blood returned and needles pulled . Post Bleeding time < 5 minutes. Net uf removed 1500ml/ post B/P 159/73. Patient alert and stable

## 2025-01-10 NOTE — PLAN OF CARE
[facility]  Discharge Final Note    Primary Care Provider: Chauncey Perez MD    Expected Discharge Date: 1/10/2025    Final Discharge Note (most recent)       Final Note - 01/10/25 1419          Final Note    Assessment Type Final Discharge Note (P)      Anticipated Discharge Disposition Home or Self Care (P)      Hospital Resources/Appts/Education Provided Provided patient/caregiver with written discharge plan information (P)         Post-Acute Status    Discharge Delays None known at this time (P)                      Important Message from Medicare

## 2025-01-10 NOTE — ED NOTES
Assumed care of patient and have received report from JUAN J Fernandez. Patient is connected to cardiac monitor, cycling bp cuff and pulse ox. No further complaints at this time. Patient has been provided with a urinal at bedside.

## 2025-01-11 NOTE — HOSPITAL COURSE
59M with PMH ESRD on HD, HTN admitted with hypertensive emergency in the setting of missed outpt HD session, was subsequently transferred to MICU for Cardene ggt which was able to be weaned off after short period after HD session complete.  Hypokalemic at 7.7.  Another HD session completed day after admission, BP more controlled and K improved.  Encouraged compliance with HD sessions and also HTN medications.  Stable for discharge

## 2025-01-11 NOTE — DISCHARGE SUMMARY
[facility]  Hospital Medicine  Discharge Summary      Patient Name: Catalino Mcfadden  MRN: [id number]  CASANDRA: [id number]  Patient Class: IP- Inpatient  Admission Date: 1/8/2025  Hospital Length of Stay: 1 days  Discharge Date and Time:  01/11/2025 8:31 AM  Attending Physician: Lizbeth att. providers found   Discharging Provider: Nathan Nava DO  Primary Care Provider: Chauncey Perez MD  [facility] Medicine Team: 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 Patient is lethargic on interview but states his symptoms are associated with shortness of breath and trouble swallowing. When questioned further patient states that all of these symptoms have been going on for the past 3 years. He states he missed his dialysis appoint the previous day and has been dry retching worse for the past several hours. He denies vomiting or abdominal pain. He denies chest pain, palpitations, headaches, lightheadedness, or vision changes.     While in the ED: Blood pressure up to 250s/130s. Patient lethargic falling asleep but oriented. WBC wnl. Sodium 127. K 7.7. BUN 80. Creatinine 8.0. . Bilirubin 2.2. BNP 2358. Trop negative. EKG with peaking T waves. CXR with chronic interstitial prominence. Patient given calcium gluconate and shifted for hyperkalemia. Nephrology consulted and patient received dialysis in the ED.    * No surgery found *      Hospital Course:   59M with PMH ESRD on HD, HTN admitted with hypertensive emergency in the setting of missed outpt HD session, was subsequently transferred to MICU for Cardene  ggt which was able to be weaned off after short period after HD session complete.  Hypokalemic at 7.7.  Another HD session completed day after admission, BP more controlled and K improved.  Encouraged compliance with HD sessions and also HTN medications.  Stable for discharge     Physical Exam  Gen: in NAD, appears stated age  Neuro: AAOx3, motor, sensory, and strength grossly intact BL  HEENT: NTNC, EOMI, PERRL, MMM  CVS: RRR, no m/r/g; S1/S2 auscultated with no S3 or S4; capillary refill < 2 sec  Resp: lungs CTAB, no w/r/r; no belabored breathing or accessory muscle use appreciated   Abd: BS+ in all 4 quadrants; NTND, soft to palpation; no organomegaly appreciated   Extrem: pulses full, equal, and regular over all 4 extremities; no UE or LE edema BL    Goals of Care Treatment Preferences:  Code Status: Full Code         Consults:   Consults (From admission, onward)          Status Ordering Provider     Inpatient consult to Critical Care Medicine  Once        Provider:  (Not yet assigned)    Completed NELLIE LARSEN     Inpatient consult to Nephrology  Once        Provider:  (Not yet assigned)    Completed AUSTIN HDEZ            * Hypertensive emergency  Patient has a current diagnosis of Hypertensive emergency with end organ damage evidenced by Elevated BNP    Temp:  [97.9 °F (36.6 °C)-98 °F (36.7 °C)]   Pulse:  []   Resp:  [14-28]   BP: (194-256)/()   SpO2:  [92 %-99 %] .     Hypertension Medications               carvediloL (COREG) 6.25 MG tablet Take 1 tablet (6.25 mg total) by mouth 2 (two) times daily with meals.    hydrALAZINE (APRESOLINE) 100 MG tablet Take 1 tablet (100 mg total) by mouth 3 (three) times daily.    NIFEdipine (PROCARDIA XL) 90 MG (OSM) 24 hr tablet Take 1 tablet (90 mg total) by mouth once daily.        -Monitor for improvement following dialysis    Elevated brain natriuretic peptide (BNP) level  Patient with hypertensive emergency with BNP of >2300. Not on oxygen. No  history of heart failure. Last echo in 2021.  -F/u repeat echo  -Control of hypertension with dialysis and home meds      Intractable hiccups  Chronic condition.  -Continue home prn thorazine    ESRD on hemodialysis  Creatine stable for now. BMP reviewed- noted Estimated Creatinine Clearance: 11.1 mL/min (A) (based on SCr of 8 mg/dL (H)). according to latest data. Based on current GFR, CKD stage is end stage.  Still makes urine.     -Nephrology consulted for dialysis   -Monitor UOP and serial BMP and adjust therapy as needed.   -Renally dose meds. Avoid nephrotoxic medications and procedures.    Hyperkalemia  K 7.7 on admission. After missing 1 session of dialysis. EKG with peaking T waves. Patient given calcium gluconate and shifted for hyperkalemia. Nephrology consulted and patient received dialysis in the ED.    Plan  - Receiving dialysis in ED  - BMP every 4 hours  - Monitor for arrhythmias with EKG and/or continuous telemetry.   - Treat the hyperkalemia with Potassium Binders, Calcium gluconate, IV insulin and dextrose, and Furosemide.             Hyponatremia  Hyponatremia is likely due to renal insufficiency. The patient's most recent sodium results are listed below.  Recent Labs     01/08/25  2253   *     Plan  - Getting dialysis  - BMP q4h    Type 2 diabetes mellitus with stage 5 chronic kidney disease  Lab Results   Component Value Date    HGBA1C 4.1 05/06/2024     Recent Labs     01/09/25  0010 01/09/25  0142 01/09/25  0213 01/09/25  0233 01/09/25  0433   POCTGLUCOSE 62* 100 219* 192* 27*     Antihyperglycemics (From admission, onward)      Start     Stop Route Frequency Ordered    01/09/25 0457  insulin aspart U-100 pen 0-5 Units         -- SubQ Before meals & nightly PRN 01/09/25 0358        - LDSSI  - renal diet  - POCT glucose checks TIDWM and qhs  - goal glucose 140-180        Final Active Diagnoses:    Diagnosis Date Noted POA    PRINCIPAL PROBLEM:  Hypertensive emergency [I16.1] 12/12/2023  Yes    Elevated brain natriuretic peptide (BNP) level [R79.89] 01/09/2025 Yes    ESRD on hemodialysis [N18.6, Z99.2] 02/22/2023 Not Applicable    Hyperkalemia [E87.5] 07/22/2020 Yes    Hyponatremia [E87.1] 07/16/2020 Yes    Type 2 diabetes mellitus with stage 5 chronic kidney disease [E11.22, N18.5] 08/26/2014 Yes      Problems Resolved During this Admission:       Discharged Condition: fair    Disposition: Home or Self Care    Follow Up:    Patient Instructions:   No discharge procedures on file.    Significant Diagnostic Studies: Labs: BMP:   Recent Labs   Lab 01/10/25  0341 01/10/25  0910 01/10/25  1233   GLU 90  90 75 97   *  130* 129* 128*   K 5.5*  5.5* 5.6* 3.9   CL 97  97 97 95   CO2 18*  18* 20* 24   BUN 51*  51* 54* 27*   CREATININE 5.7*  5.7* 5.8* 3.6*   CALCIUM 9.0  9.0 8.8 8.4*   MG 2.0  --   --        Pending Diagnostic Studies:       Procedure Component Value Units Date/Time    Basic Metabolic Panel [[id number]] Collected: 01/09/25 0809    Order Status: Sent Lab Status: No result     Specimen: Blood     Basic metabolic panel [[id number]] Collected: 01/09/25 1639    Order Status: Sent Lab Status: No result     Specimen: Blood     Basic metabolic panel [[id number]] Collected: 01/09/25 1639    Order Status: Sent Lab Status: No result     Specimen: Blood            Medications:  Reconciled Home Medications:      Medication List        CHANGE how you take these medications      carvediloL 12.5 MG tablet  Commonly known as: COREG  Take 1 tablet (12.5 mg total) by mouth 2 (two) times daily.  What changed:   medication strength  how much to take  when to take this            CONTINUE taking these medications      chlorproMAZINE 25 MG tablet  Commonly known as: THORAZINE  Take 1-2 tablets (25-50 mg total) by mouth 3 (three) times daily. For hiccups. Can adjust dose for sleepiness     gabapentin 100 MG capsule  Commonly known as: NEURONTIN  Take 1 capsule (100 mg total) by mouth 3 (three) times  daily.     hydrALAZINE 100 MG tablet  Commonly known as: APRESOLINE  Take 1 tablet (100 mg total) by mouth 3 (three) times daily.     NIFEdipine 90 MG (OSM) 24 hr tablet  Commonly known as: PROCARDIA XL  Take 1 tablet (90 mg total) by mouth once daily.     senna-docusate 8.6-50 mg 8.6-50 mg per tablet  Commonly known as: PERICOLACE  Take 2 tablets by mouth daily as needed for Constipation.              Indwelling Lines/Drains at time of discharge:   Lines/Drains/Airways       Drain  Duration                  Hemodialysis AV Fistula Left upper arm -- days                    Time spent on the discharge of patient: 35 minutes     Pt deemed appropriate for discharge. Plan discussed with pt, who was agreeable and amenable; medications were discussed and reviewed, outpatient follow-up scheduled, ER precautions were given, all questions were answered to the pt's satisfaction, and Mr Mcfadden was subsequently discharged.      Nathan Nava DO  Department of Hospital Medicine  [facility]

## 2025-01-13 ENCOUNTER — PATIENT OUTREACH (OUTPATIENT)
Dept: ADMINISTRATIVE | Facility: CLINIC | Age: 60
End: 2025-01-13

## 2025-01-13 NOTE — PROGRESS NOTES
C3 nurse attempted to contact Catalino Mcfadden for a TCC post hospital discharge follow up call. No answer, left voicemail with callback information. The patient does not have a scheduled HOSFU appointment with his PCP, Chauncey Perez MD within the first 5-7 days post DC date of 1/10/25. No messages routed at this time.

## 2025-01-15 NOTE — PROGRESS NOTES
C3 nurse attempted to contact Catalino Mcfadden for a TCC post hospital discharge follow up call. No answer, left voicemail with callback information. The patient does not have a scheduled HOSFU appointment with his PCP, Chauncey Perez MD within the first 5-7 days post DC date of 1/10/25. Message routed to Chauncey Perez MD for appt assistance.

## 2025-01-20 ENCOUNTER — PATIENT MESSAGE (OUTPATIENT)
Dept: INTERNAL MEDICINE | Facility: CLINIC | Age: 60
End: 2025-01-20

## 2025-01-23 ENCOUNTER — TELEPHONE (OUTPATIENT)
Dept: INTERNAL MEDICINE | Facility: CLINIC | Age: 60
End: 2025-01-23

## 2025-02-20 ENCOUNTER — TELEPHONE (OUTPATIENT)
Dept: TRANSPLANT | Facility: CLINIC | Age: 60
End: 2025-02-20

## 2025-02-26 ENCOUNTER — OFFICE VISIT (OUTPATIENT)
Dept: PODIATRY | Facility: CLINIC | Age: 60
End: 2025-02-26

## 2025-02-26 VITALS — BODY MASS INDEX: 25.19 KG/M2 | WEIGHT: 190.06 LBS | HEIGHT: 73 IN

## 2025-02-26 DIAGNOSIS — B35.1 ONYCHOMYCOSIS DUE TO DERMATOPHYTE: ICD-10-CM

## 2025-02-26 DIAGNOSIS — L84 PRE-ULCERATIVE CALLUSES: Primary | ICD-10-CM

## 2025-02-26 PROCEDURE — 99999 PR PBB SHADOW E&M-EST. PATIENT-LVL III: CPT | Mod: PBBFAC,,, | Performed by: PODIATRIST

## 2025-02-26 PROCEDURE — 99213 OFFICE O/P EST LOW 20 MIN: CPT | Mod: PBBFAC,PO | Performed by: PODIATRIST

## 2025-02-26 NOTE — PROGRESS NOTES
Patient came in in for routine high risk foot care and exam with history of OM and multiple wounds, however here were issues with new insurance verification and visit changed to cosmetic nail care.    Nails are elongated, thickened Bilaterally.   Pre ulcerative calluses    The primary encounter diagnosis was Pre-ulcerative calluses. A diagnosis of Onychomycosis due to dermatophyte was also pertinent to this visit.     Nails were reduced Bilaterally.  After cleansing the  area w/ alcohol prep pad the above mentioned hyperkeratosis was trimmed utilizing No 15 scapel, to a smooth base with out incident. Patient tolerated this  well and reported comfort      Patient tolerated well and related relief. RTC in 2-3 weeks for full exam

## 2025-03-06 ENCOUNTER — TELEPHONE (OUTPATIENT)
Dept: VASCULAR SURGERY | Facility: CLINIC | Age: 60
End: 2025-03-06

## 2025-03-06 NOTE — TELEPHONE ENCOUNTER
----- Message from Albaro sent at 3/6/2025  2:02 PM CST -----  Regarding: Chayito Veliz  Patient is requesting a sooner appointment.  Patient declined first available appointment listed as well as another facility and provider .  Patient will not accept being placed on the waitlist and is requesting a message be sent to doctor.Name of Caller: Chayito When is the first available appointment? 05/06Symptoms:  pt access port needs attention Would the patient rather a call back or a response via [facility]?  Call back Best Call Back Number: [phone] Information:

## 2025-03-06 NOTE — TELEPHONE ENCOUNTER
Called and spoke with Chayito at [facility]. States pt. access flows dropping and clearance dropping. ? Stenosis. Pt. able to get full dialysis treatment. Having issues with bleeding after and needs to hold site for 40 min- 1 hour. Has an area of weakness more to venous upper portion of access. Dialysis treatment on T/TH/Sat early am and done by 12 noon. Chayito aware providers out of office this week and if pt. has issue prior to appt.with vascular he needs to go to the ER. I called pt. and no answer. Left message that dialysis nurse called and to call our office. I scheduled pt.with a sooner appt.for pt.to see a vascular provider on 3/13/2025 and message sent to dept. for pt. to get ultrasound prior. Message sent to providers.

## 2025-03-10 ENCOUNTER — PATIENT MESSAGE (OUTPATIENT)
Dept: PODIATRY | Facility: CLINIC | Age: 60
End: 2025-03-10

## 2025-03-10 ENCOUNTER — TELEPHONE (OUTPATIENT)
Dept: VASCULAR SURGERY | Facility: CLINIC | Age: 60
End: 2025-03-10

## 2025-03-10 NOTE — TELEPHONE ENCOUNTER
Called pt. And no answer. Left message regarding appt. with vascular provider and ultrasound test scheduled prior. [facility] and spoke with nurse on duty and aware of pt. appts and if he has any issues prior with access he needs to go to the ER  Nurse verbalized understanding and will give pt. message.

## 2025-03-11 ENCOUNTER — HOSPITAL ENCOUNTER (OUTPATIENT)
Facility: HOSPITAL | Age: 60
Discharge: HOME | End: 2025-03-12
Attending: EMERGENCY MEDICINE | Admitting: EMERGENCY MEDICINE

## 2025-03-11 DIAGNOSIS — R53.1 WEAK: ICD-10-CM

## 2025-03-11 DIAGNOSIS — E87.5 HYPERKALEMIA: Primary | ICD-10-CM

## 2025-03-11 DIAGNOSIS — N18.6 ESRD ON HEMODIALYSIS: ICD-10-CM

## 2025-03-11 DIAGNOSIS — M25.569 KNEE PAIN: ICD-10-CM

## 2025-03-11 DIAGNOSIS — Z99.2 ESRD ON HEMODIALYSIS: ICD-10-CM

## 2025-03-11 LAB
ALBUMIN SERPL BCP-MCNC: 3 G/DL (ref 3.5–5.2)
ALP SERPL-CCNC: 760 U/L (ref 40–150)
ALT SERPL W/O P-5'-P-CCNC: 44 U/L (ref 10–44)
ANION GAP SERPL CALC-SCNC: 13 MMOL/L (ref 8–16)
AST SERPL-CCNC: 61 U/L (ref 10–40)
BASOPHILS # BLD AUTO: 0.02 K/UL (ref 0–0.2)
BASOPHILS NFR BLD: 0.4 % (ref 0–1.9)
BILIRUB SERPL-MCNC: 1.5 MG/DL (ref 0.1–1)
BUN SERPL-MCNC: 59 MG/DL (ref 6–20)
CALCIUM SERPL-MCNC: 11.2 MG/DL (ref 8.7–10.5)
CHLORIDE SERPL-SCNC: 87 MMOL/L (ref 95–110)
CO2 SERPL-SCNC: 29 MMOL/L (ref 23–29)
CREAT SERPL-MCNC: 5.8 MG/DL (ref 0.5–1.4)
DIFFERENTIAL METHOD BLD: ABNORMAL
EOSINOPHIL # BLD AUTO: 0.1 K/UL (ref 0–0.5)
EOSINOPHIL NFR BLD: 2.9 % (ref 0–8)
ERYTHROCYTE [DISTWIDTH] IN BLOOD BY AUTOMATED COUNT: 15.1 % (ref 11.5–14.5)
EST. GFR  (NO RACE VARIABLE): 10.5 ML/MIN/1.73 M^2
GLUCOSE SERPL-MCNC: 144 MG/DL (ref 70–110)
HBV SURFACE AG SERPL QL IA: NORMAL
HCT VFR BLD AUTO: 35.6 % (ref 40–54)
HCV AB SERPL QL IA: NORMAL
HGB BLD-MCNC: 11.5 G/DL (ref 14–18)
HIV 1+2 AB+HIV1 P24 AG SERPL QL IA: NORMAL
IMM GRANULOCYTES # BLD AUTO: 0.02 K/UL (ref 0–0.04)
IMM GRANULOCYTES NFR BLD AUTO: 0.4 % (ref 0–0.5)
LYMPHOCYTES # BLD AUTO: 0.3 K/UL (ref 1–4.8)
LYMPHOCYTES NFR BLD: 6.1 % (ref 18–48)
MAGNESIUM SERPL-MCNC: 2.7 MG/DL (ref 1.6–2.6)
MCH RBC QN AUTO: 28.1 PG (ref 27–31)
MCHC RBC AUTO-ENTMCNC: 32.3 G/DL (ref 32–36)
MCV RBC AUTO: 87 FL (ref 82–98)
MONOCYTES # BLD AUTO: 0.5 K/UL (ref 0.3–1)
MONOCYTES NFR BLD: 11.4 % (ref 4–15)
NEUTROPHILS # BLD AUTO: 3.6 K/UL (ref 1.8–7.7)
NEUTROPHILS NFR BLD: 78.8 % (ref 38–73)
NRBC BLD-RTO: 0 /100 WBC
OHS QRS DURATION: 80 MS
OHS QTC CALCULATION: 465 MS
PHOSPHATE SERPL-MCNC: 6 MG/DL (ref 2.7–4.5)
PLATELET # BLD AUTO: 155 K/UL (ref 150–450)
PMV BLD AUTO: 10.1 FL (ref 9.2–12.9)
POTASSIUM SERPL-SCNC: 5.3 MMOL/L (ref 3.5–5.1)
PROT SERPL-MCNC: 7.9 G/DL (ref 6–8.4)
RBC # BLD AUTO: 4.09 M/UL (ref 4.6–6.2)
SODIUM SERPL-SCNC: 129 MMOL/L (ref 136–145)
TROPONIN I SERPL DL<=0.01 NG/ML-MCNC: 8 NG/L (ref 0–35)
WBC # BLD AUTO: 4.56 K/UL (ref 3.9–12.7)

## 2025-03-11 PROCEDURE — 83735 ASSAY OF MAGNESIUM: CPT | Mod: NTX

## 2025-03-11 PROCEDURE — 84100 ASSAY OF PHOSPHORUS: CPT | Mod: NTX

## 2025-03-11 PROCEDURE — 87340 HEPATITIS B SURFACE AG IA: CPT | Mod: NTX | Performed by: EMERGENCY MEDICINE

## 2025-03-11 PROCEDURE — 85025 COMPLETE CBC W/AUTO DIFF WBC: CPT | Mod: NTX

## 2025-03-11 PROCEDURE — 25000003 PHARM REV CODE 250: Mod: NTX | Performed by: PHYSICIAN ASSISTANT

## 2025-03-11 PROCEDURE — 99285 EMERGENCY DEPT VISIT HI MDM: CPT | Mod: 25

## 2025-03-11 PROCEDURE — 80053 COMPREHEN METABOLIC PANEL: CPT | Mod: NTX

## 2025-03-11 PROCEDURE — 86803 HEPATITIS C AB TEST: CPT | Mod: NTX | Performed by: PHYSICIAN ASSISTANT

## 2025-03-11 PROCEDURE — 96361 HYDRATE IV INFUSION ADD-ON: CPT

## 2025-03-11 PROCEDURE — G0378 HOSPITAL OBSERVATION PER HR: HCPCS | Mod: NTX

## 2025-03-11 PROCEDURE — 93010 ELECTROCARDIOGRAM REPORT: CPT | Mod: NTX,,, | Performed by: INTERNAL MEDICINE

## 2025-03-11 PROCEDURE — 25000003 PHARM REV CODE 250: Mod: NTX

## 2025-03-11 PROCEDURE — G0257 UNSCHED DIALYSIS ESRD PT HOS: HCPCS | Mod: NTX

## 2025-03-11 PROCEDURE — 96360 HYDRATION IV INFUSION INIT: CPT

## 2025-03-11 PROCEDURE — 84484 ASSAY OF TROPONIN QUANT: CPT | Mod: NTX

## 2025-03-11 PROCEDURE — 87389 HIV-1 AG W/HIV-1&-2 AB AG IA: CPT | Mod: NTX | Performed by: PHYSICIAN ASSISTANT

## 2025-03-11 PROCEDURE — 93005 ELECTROCARDIOGRAM TRACING: CPT | Mod: NTX

## 2025-03-11 RX ORDER — CHLORPROMAZINE HYDROCHLORIDE 25 MG/1
25 TABLET, FILM COATED ORAL 3 TIMES DAILY
Status: DISCONTINUED | OUTPATIENT
Start: 2025-03-12 | End: 2025-03-12 | Stop reason: HOSPADM

## 2025-03-11 RX ORDER — SODIUM CHLORIDE 9 MG/ML
INJECTION, SOLUTION INTRAVENOUS ONCE
Status: COMPLETED | OUTPATIENT
Start: 2025-03-11 | End: 2025-03-11

## 2025-03-11 RX ORDER — ACETAMINOPHEN 500 MG
1000 TABLET ORAL EVERY 6 HOURS PRN
Status: DISCONTINUED | OUTPATIENT
Start: 2025-03-11 | End: 2025-03-12 | Stop reason: HOSPADM

## 2025-03-11 RX ORDER — ONDANSETRON HYDROCHLORIDE 2 MG/ML
4 INJECTION, SOLUTION INTRAVENOUS EVERY 8 HOURS PRN
Status: DISCONTINUED | OUTPATIENT
Start: 2025-03-11 | End: 2025-03-12 | Stop reason: HOSPADM

## 2025-03-11 RX ORDER — HYDRALAZINE HYDROCHLORIDE 25 MG/1
50 TABLET, FILM COATED ORAL 3 TIMES DAILY
Status: DISCONTINUED | OUTPATIENT
Start: 2025-03-11 | End: 2025-03-11

## 2025-03-11 RX ORDER — CHLORPROMAZINE HYDROCHLORIDE 25 MG/1
25 TABLET, FILM COATED ORAL 3 TIMES DAILY
Status: DISCONTINUED | OUTPATIENT
Start: 2025-03-11 | End: 2025-03-11

## 2025-03-11 RX ORDER — HYDRALAZINE HYDROCHLORIDE 25 MG/1
50 TABLET, FILM COATED ORAL
Status: COMPLETED | OUTPATIENT
Start: 2025-03-11 | End: 2025-03-11

## 2025-03-11 RX ORDER — HYDRALAZINE HYDROCHLORIDE 25 MG/1
50 TABLET, FILM COATED ORAL 3 TIMES DAILY
Status: DISCONTINUED | OUTPATIENT
Start: 2025-03-12 | End: 2025-03-12 | Stop reason: HOSPADM

## 2025-03-11 RX ORDER — CHLORPROMAZINE HYDROCHLORIDE 25 MG/1
25 TABLET, FILM COATED ORAL
Status: COMPLETED | OUTPATIENT
Start: 2025-03-11 | End: 2025-03-11

## 2025-03-11 RX ORDER — SODIUM CHLORIDE 0.9 % (FLUSH) 0.9 %
10 SYRINGE (ML) INJECTION
Status: DISCONTINUED | OUTPATIENT
Start: 2025-03-11 | End: 2025-03-12 | Stop reason: HOSPADM

## 2025-03-11 RX ORDER — TALC
6 POWDER (GRAM) TOPICAL NIGHTLY PRN
Status: DISCONTINUED | OUTPATIENT
Start: 2025-03-11 | End: 2025-03-12 | Stop reason: HOSPADM

## 2025-03-11 RX ADMIN — SODIUM ZIRCONIUM CYCLOSILICATE 10 G: 10 POWDER, FOR SUSPENSION ORAL at 12:03

## 2025-03-11 RX ADMIN — CHLORPROMAZINE HYDROCHLORIDE 25 MG: 25 TABLET, FILM COATED ORAL at 09:03

## 2025-03-11 RX ADMIN — SODIUM CHLORIDE: 9 INJECTION, SOLUTION INTRAVENOUS at 02:03

## 2025-03-11 RX ADMIN — HYDRALAZINE HYDROCHLORIDE 50 MG: 25 TABLET ORAL at 08:03

## 2025-03-11 NOTE — NURSING
Assumed care for pt after recieving report from previous RN. Pt resting in bed in NAD, RR e/u. Vital signs stable and WDL at this time of assessment.     LOC: The patient is awake, alert, and oriented to self, place, time, and situation. Pt is calm and cooperative. Affect is appropriate.  Speech is appropriate and clear.      APPEARANCE: Patient resting comfortably, in no acute distress.  Patient is clean and well groomed.     SKIN: The skin is warm and dry; color consistent with ethnicity.  Patient has normal skin turgor and moist mucus membranes.  Skin intact; no breakdown or bruising noted.      MUSCULOSKELETAL: Patient moving upper  extremities without difficulty; endorses weakness.      RESPIRATORY: Airway is open and patent. Respirations spontaneous, even, easy, and non-labored.  Patient has a normal effort and rate.  No accessory muscle use noted. Denies cough.      CARDIAC:  peripheral edema noted. No complaints of chest pain.      ABDOMEN: Soft and non tender to palpation.  No distention noted. Pt denies abdominal pain; denies nausea, vomiting, diarrhea, or constipation.     NEUROLOGIC: Eyes open spontaneously.  Behavior appropriate to situation.  Follows commands; facial expression symmetrical.  Purposeful motor response noted; normal sensation in all extremities. Pt denies headache; denies lightheadedness or dizziness; denies visual disturbances; denies loss of balance; denies unilateral weakness.    Main Complaint: Fatigue / HD

## 2025-03-11 NOTE — CONSULTS
[facility] - Emergency Dept  Nephrology  Consult Note    Patient Name: Catalino Mcfadden  MRN: [id number]  Admission Date: 3/11/2025  Hospital Length of Stay: 0 days  Attending Provider: Jrei Cummins MD   Primary Care Physician: Chauncey Perez MD  Principal Problem:<principal problem not specified>    Inpatient consult to Nephrology  Consult performed by: Judy Bravo PA-C  Consult ordered by: James David MD  Reason for consult: ESRD        Subjective:     HPI: 59-year-old male with pmhx significant for T2DM, ESRD HD TThS, HTN, HLD presenting for evaluation weakness and fall.  He states earlier today his legs felt weak, causing him to fall to the ground.  No documented loss of consciousness, but had difficulty getting up after fall.  He has a history of weakness that he has been being worked up for outpatient.  He is due for dialysis today.  Last dialysis Saturday March 8th.  He denies any chest pain, shortness of breath, nausea, vomiting, diarrhea.  In the ED, CMP with Na 129, K 5.3, CO2 29, BUN 59, creatinine 5.8, consistent with ESRD.  He was placed in observation.  Nephrology consulted for HD in the setting of ESRD.    Past Medical History:   Diagnosis Date    Cancer     Diabetes mellitus, type 2     Diabetic foot ulcer associated with diabetes mellitus due to underlying condition 07/16/2020    ESRD on hemodialysis     Hyperlipidemia     Hypertension     Obese        Past Surgical History:   Procedure Laterality Date    AV FISTULA PLACEMENT Left 07/06/2023    Procedure: CREATION, AV FISTULA;  Surgeon: Daniel Poon MD;  Location: [facility] OR;  Service: Vascular;  Laterality: Left;  RN PREOP 6/28/2023  LABS DAY OF SURGERY    BONE BIOPSY Left 07/17/2020    Procedure: BIOPSY, BONE;  Surgeon: Verona Whitmore DPM;  Location: [facility] OR;  Service: Podiatry;  Laterality: Left;    CERVICAL DISC SURGERY  07/11/2016    COLONOSCOPY N/A 3/25/2024    Procedure: COLONOSCOPY;  Surgeon: Roopa Pérez MD;  Location: [facility]  ENDO (4TH FLR);  Service: Endoscopy;  Laterality: N/A;  Ref By: Dr. Pérez   constipation prep  Diaylsis Patient Lab has been ordered  3/20-precall complete-MS    COLONOSCOPY N/A 4/8/2024    Procedure: COLONOSCOPY;  Surgeon: Roopa Pérez MD;  Location: [facility] (2ND FLR);  Service: Endoscopy;  Laterality: N/A;  Prep instructions sent via portal/given to pt in clinic  pt had to be r/s at  request  Dialysis Tues,Thurs,Sat-dw  Peg Prep-dw  4/2/24- Labs - dialysis /poor prep on 3/26 - PEG x 2 - extended prep /LVM for precall - ERW  4/5-LVM for precall-KPvt    COLONOSCOPY N/A 4/16/2024    Procedure: COLONOSCOPY;  Surgeon: Jarett Hill MD;  Location: [facility] (2ND FLR);  Service: Endoscopy;  Laterality: N/A;    DEBRIDEMENT Left 07/17/2020    Procedure: DEBRIDEMENT;  Surgeon: Verona Whitmore DPM;  Location: [facility] OR;  Service: Podiatry;  Laterality: Left;    DEBRIDEMENT OF FOOT Right     toes & plantar surface    ENDOBRONCHIAL ULTRASOUND N/A 3/1/2024    Procedure: ENDOBRONCHIAL ULTRASOUND (EBUS);  Surgeon: Francisco Fleming MD;  Location: [facility] OR 2ND FLR;  Service: Pulmonary;  Laterality: N/A;    ENDOSCOPIC MUCOSAL RESECTION OF COLON N/A 9/9/2024    Procedure: RESECTION, MUCOSA, COLON, ENDOSCOPIC;  Surgeon: Vikas Quinonez MD;  Location: [facility] (2ND FLR);  Service: Endoscopy;  Laterality: N/A;  6/11 portal-peg-dialysis T Th Sat-+K scheduled-.colonoscopy in 3 months with [facility] doc, [facility], removal of TI polyp and assess prior EMR sites. Cristiano-tt  7/19/24: rescheduled instructions sent via portal-GD  9/3-lvm for pre call-tb  9/4-pre call complete-    ESOPHAGEAL MANOMETRY WITH MEASUREMENT OF IMPEDANCE N/A 03/27/2023    Procedure: MANOMETRY, ESOPHAGUS, WITH IMPEDANCE MEASUREMENT;  Surgeon: Roopa Pérez MD;  Location: [facility] (4TH FLR);  Service: Endoscopy;  Laterality: N/A;  Belching and rumination protocol please  instructions via portal - sm    ESOPHAGOGASTRODUODENOSCOPY N/A 12/16/2022    Procedure:  EGD (ESOPHAGOGASTRODUODENOSCOPY);  Surgeon: Eren Francois MD;  Location: [facility] ENDO;  Service: Endoscopy;  Laterality: N/A;  Pt. on Dialysis. K+ ordered prior to procedure.EC    ESOPHAGOGASTRODUODENOSCOPY N/A 12/5/2023    Procedure: EGD (ESOPHAGOGASTRODUODENOSCOPY);  Surgeon: Kash Johnston MD;  Location: [facility] ENDO;  Service: Endoscopy;  Laterality: N/A;    FRACTURE SURGERY Right     medial ankle, metal plate present    INJECTION OF ANESTHETIC AGENT AROUND NERVE Right 2/2/2024    Procedure: BLOCK, NERVE STELLATE GANGLION *HOLD ASPIRIN 5 DAYS PRIOR*;  Surgeon: Gokul Gonzalez MD;  Location: [facility] PAIN MGT;  Service: Pain Management;  Laterality: Right;  [phone]    INSERTION OF TUNNELED CENTRAL VENOUS CATHETER (CVC) WITH SUBCUTANEOUS PORT N/A 06/11/2021    Procedure: TUNNEL CATH INSERTION WITHOUT PORT;  Surgeon: Dosc Diagnostic Provider;  Location: [facility] OR;  Service: Radiology;  Laterality: N/A;  11AM START---PHONE PREOP 6/10/21---COVID POSTIVE ON 7/2020---NO S/S    left leg orthopedic surgery Left     MUSCLE BIOPSY Left 11/4/2024    Procedure: BIOPSY, MUSCLE;  Surgeon: Adithya Lewis MD;  Location: [facility] OR;  Service: Neurosurgery;  Laterality: Left;  Left quadriceps muscle biopsy, will need standard pathology protocol for muscle sample. MAC anesthetic with local anesthetic injection    RN PREOP 10/28/2024    REVISION OF ARTERIOVENOUS FISTULA Left 3/14/2024    Procedure: REVISION, AV FISTULA;  Surgeon: Daniel Poon MD;  Location: [facility] OR;  Service: Vascular;  Laterality: Left;  RN preop 3/6/2024----NEED ORDERS    UPPER GASTROINTESTINAL ENDOSCOPY         Review of patient's allergies indicates:  No Known Allergies  Current Facility-Administered Medications   Medication Frequency    0.9% NaCl infusion Once     Current Outpatient Medications   Medication    carvediloL (COREG) 12.5 MG tablet    chlorproMAZINE (THORAZINE) 25 MG tablet    gabapentin (NEURONTIN) 100 MG capsule    hydrALAZINE  (APRESOLINE) 100 MG tablet    NIFEdipine (PROCARDIA XL) 90 MG (OSM) 24 hr tablet    senna-docusate 8.6-50 mg (PERICOLACE) 8.6-50 mg per tablet     Family History       Problem Relation (Age of Onset)    Heart disease Father          Tobacco Use    Smoking status: Never    Smokeless tobacco: Never   Substance and Sexual Activity    Alcohol use: Not Currently     Comment: occ rare beer    Drug use: No    Sexual activity: Not Currently     Review of Systems   Constitutional:  Negative for fever.   HENT:  Negative for congestion.    Respiratory:  Negative for shortness of breath.    Cardiovascular:  Negative for chest pain.   Gastrointestinal:  Negative for diarrhea, nausea and vomiting.   Neurological:  Positive for weakness.     Objective:     Vital Signs (Most Recent):  Temp: 97.9 °F (36.6 °C) (03/11/25 1415)  Pulse: 92 (03/11/25 1545)  Resp: 18 (03/11/25 1415)  BP: (!) 162/84 (03/11/25 1545)  SpO2: 100 % (03/11/25 1315) Vital Signs (24h Range):  Temp:  [97.6 °F (36.4 °C)-98.1 °F (36.7 °C)] 97.9 °F (36.6 °C)  Pulse:  [85-92] 92  Resp:  [18-20] 18  SpO2:  [99 %-100 %] 100 %  BP: (162-186)/(84-99) 162/84     Weight: 86.2 kg (190 lb) (03/11/25 0739)  Body mass index is 25.07 kg/m².  Body surface area is 2.11 meters squared.    No intake/output data recorded.     Physical Exam  Vitals and nursing note reviewed.   Constitutional:       General: He is not in acute distress.  HENT:      Head: Normocephalic.   Eyes:      Conjunctiva/sclera: Conjunctivae normal.   Cardiovascular:      Rate and Rhythm: Normal rate.   Pulmonary:      Effort: Pulmonary effort is normal. No respiratory distress.   Abdominal:      General: There is no distension.      Palpations: Abdomen is soft.   Musculoskeletal:      Right lower leg: No edema.      Left lower leg: No edema.   Skin:     General: Skin is warm and dry.   Neurological:      Mental Status: He is alert.   Psychiatric:         Mood and Affect: Mood normal.          Significant  Labs:  CBC:   Recent Labs   Lab 03/11/25  0816   WBC 4.56   RBC 4.09*   HGB 11.5*   HCT 35.6*      MCV 87   MCH 28.1   MCHC 32.3     CMP:   Recent Labs   Lab 03/11/25  0816   *   CALCIUM 11.2*   ALBUMIN 3.0*   PROT 7.9   *   K 5.3*   CO2 29   CL 87*   BUN 59*   CREATININE 5.8*   ALKPHOS 760*   ALT 44   AST 61*   BILITOT 1.5*     All labs within the past 24 hours have been reviewed.    Assessment/Plan:     Renal/  ESRD (end stage renal disease)    Outpatient HD Information:  -Dialysis modality: Hemodialysis  -Outpatient HD unit: [facility]  -Nephrologist: ?  -HD TX days: Tuesday/Thursday/Saturday, duration of treatment: 4 hrs  -Last HD TX prior to hospital admission:   -Dialysis access: LUE AV fistula   -Residual urine: Minium  -EDW: 79.9 kg    Plan/Recommendations  -HD today for mild hyperkalemia, to continue TTS schedule  -Seen on HD, tolerating tx well, no complaints  -Will continue iHD thrice weekly while IP  -Hgb goal 10-11, hgb at goal  -Strict I&Os  -Pre & post HD weight  -Resume OP phos binders  -Renal diet if not NPO         Thank you for your consult. I will follow-up with patient. Please contact us if you have any additional questions.    Judy Bravo PA-C  Nephrology  [facility] - Emergency Dept

## 2025-03-11 NOTE — ED PROVIDER NOTES
Encounter Date: 3/11/2025       History     Chief Complaint   Patient presents with    Fatigue     Pt was getting in his car to go to dialysis, started feeling weak and fell to his knees. Denies LOC.      HPI    Catalino Mcfadden is a 59 y.o. male  w/ PMHx of ESRD on HD (T/Th/Sa), noninsulin dependent DM, diffuse lymphadenopathy with extensive workup which did not signify an underlying malignancy, colon polyps (with high grade dysplasia), gastroparesis. Patient presents to the ED today for a mechanical fall w/ bilateral knee pain.  Patient states that he was walking on his way to dialysis this morning and tripped over curb falling onto his knees.  At that time, he had moderate pain to both knees, w/ left greater than right.  Patient does have weakness baseline, which he states has not worsened.  No new focal neurologic deficits.  Patient denies HA, head trauma, vision changes, syncope, lightheadedness, CP, abdominal pain, N/V/D, or any other areas of pain/trauma following his fall.    Please see MDM for additional details.    Review of patient's allergies indicates:  No Known Allergies  Past Medical History:   Diagnosis Date    Cancer     Diabetes mellitus, type 2     Diabetic foot ulcer associated with diabetes mellitus due to underlying condition 07/16/2020    ESRD on hemodialysis     Hyperlipidemia     Hypertension     Obese      Past Surgical History:   Procedure Laterality Date    AV FISTULA PLACEMENT Left 07/06/2023    Procedure: CREATION, AV FISTULA;  Surgeon: Daniel Poon MD;  Location: [facility] OR;  Service: Vascular;  Laterality: Left;  RN PREOP 6/28/2023  LABS DAY OF SURGERY    BONE BIOPSY Left 07/17/2020    Procedure: BIOPSY, BONE;  Surgeon: Verona Whitmore DPM;  Location: [facility] OR;  Service: Podiatry;  Laterality: Left;    CERVICAL DISC SURGERY  07/11/2016    COLONOSCOPY N/A 3/25/2024    Procedure: COLONOSCOPY;  Surgeon: Roopa Pérez MD;  Location: [facility] ([address] [place] [address]);  Service: Endoscopy;   Laterality: N/A;  Ref By: Dr. Pérez   constipation prep  Diaylsis Patient Lab has been ordered  3/20-precall complete-MS    COLONOSCOPY N/A 4/8/2024    Procedure: COLONOSCOPY;  Surgeon: Roopa Pérez MD;  Location: [facility] (2ND FLR);  Service: Endoscopy;  Laterality: N/A;  Prep instructions sent via portal/given to pt in clinic  pt had to be r/s at  request  Dialysis Tues,Thurs,Sat-dw  Peg Prep-dw  4/2/24- Labs - dialysis /poor prep on 3/26 - PEG x 2 - extended prep /LVM for precall - ERW  4/5-LVM for precall-KPvt    COLONOSCOPY N/A 4/16/2024    Procedure: COLONOSCOPY;  Surgeon: Jarett Hill MD;  Location: [facility] (2ND FLR);  Service: Endoscopy;  Laterality: N/A;    DEBRIDEMENT Left 07/17/2020    Procedure: DEBRIDEMENT;  Surgeon: Verona Whitmore DPM;  Location: [facility] OR;  Service: Podiatry;  Laterality: Left;    DEBRIDEMENT OF FOOT Right     toes & plantar surface    ENDOBRONCHIAL ULTRASOUND N/A 3/1/2024    Procedure: ENDOBRONCHIAL ULTRASOUND (EBUS);  Surgeon: Francisco Fleming MD;  Location: [facility] OR 2ND FLR;  Service: Pulmonary;  Laterality: N/A;    ENDOSCOPIC MUCOSAL RESECTION OF COLON N/A 9/9/2024    Procedure: RESECTION, MUCOSA, COLON, ENDOSCOPIC;  Surgeon: Vikas Quinonez MD;  Location: [facility] (2ND FLR);  Service: Endoscopy;  Laterality: N/A;  6/11 portal-peg-dialysis T Th Sat-+K scheduled-.colonoscopy in 3 months with [facility] doc, [facility], removal of TI polyp and assess prior EMR sites. Cristiano-tt  7/19/24: rescheduled instructions sent via portal-GD  9/3-lvm for pre call-tb  9/4-pre call complete-    ESOPHAGEAL MANOMETRY WITH MEASUREMENT OF IMPEDANCE N/A 03/27/2023    Procedure: MANOMETRY, ESOPHAGUS, WITH IMPEDANCE MEASUREMENT;  Surgeon: Roopa Pérez MD;  Location: [facility] (4TH FLR);  Service: Endoscopy;  Laterality: N/A;  Belching and rumination protocol please  instructions via portal - sm    ESOPHAGOGASTRODUODENOSCOPY N/A 12/16/2022    Procedure: EGD (ESOPHAGOGASTRODUODENOSCOPY);   Surgeon: Eren Francois MD;  Location: [facility] ENDO;  Service: Endoscopy;  Laterality: N/A;  Pt. on Dialysis. K+ ordered prior to procedure.EC    ESOPHAGOGASTRODUODENOSCOPY N/A 12/5/2023    Procedure: EGD (ESOPHAGOGASTRODUODENOSCOPY);  Surgeon: Kash Johnston MD;  Location: [facility] ENDO;  Service: Endoscopy;  Laterality: N/A;    FRACTURE SURGERY Right     medial ankle, metal plate present    INJECTION OF ANESTHETIC AGENT AROUND NERVE Right 2/2/2024    Procedure: BLOCK, NERVE STELLATE GANGLION *HOLD ASPIRIN 5 DAYS PRIOR*;  Surgeon: Gokul Gonzalez MD;  Location: [facility] PAIN MGT;  Service: Pain Management;  Laterality: Right;  [phone]    INSERTION OF TUNNELED CENTRAL VENOUS CATHETER (CVC) WITH SUBCUTANEOUS PORT N/A 06/11/2021    Procedure: TUNNEL CATH INSERTION WITHOUT PORT;  Surgeon: Dosc Diagnostic Provider;  Location: [facility] OR;  Service: Radiology;  Laterality: N/A;  11AM START---PHONE PREOP 6/10/21---COVID POSTIVE ON 7/2020---NO S/S    left leg orthopedic surgery Left     MUSCLE BIOPSY Left 11/4/2024    Procedure: BIOPSY, MUSCLE;  Surgeon: Adithya Lewis MD;  Location: [facility] OR;  Service: Neurosurgery;  Laterality: Left;  Left quadriceps muscle biopsy, will need standard pathology protocol for muscle sample. MAC anesthetic with local anesthetic injection    RN PREOP 10/28/2024    REVISION OF ARTERIOVENOUS FISTULA Left 3/14/2024    Procedure: REVISION, AV FISTULA;  Surgeon: Daniel Poon MD;  Location: [facility] OR;  Service: Vascular;  Laterality: Left;  RN preop 3/6/2024----NEED ORDERS    UPPER GASTROINTESTINAL ENDOSCOPY       Family History   Adopted: Yes   Problem Relation Name Age of Onset    Heart disease Father      Colon cancer Neg Hx      Esophageal cancer Neg Hx      Colon polyps Neg Hx      Stomach cancer Neg Hx       Social History[1]  Review of Systems    Physical Exam     Initial Vitals [03/11/25 0739]   BP Pulse Resp Temp SpO2   (!) 186/95 86 18 97.6 °F (36.4 °C) 99 %      MAP       --          Physical Exam    Nursing note and vitals reviewed.  Constitutional: He appears well-developed and well-nourished. No distress.   HENT:   Head: Normocephalic and atraumatic.   Nose: Nose normal. Mouth/Throat: No oropharyngeal exudate.   Eyes: Conjunctivae and EOM are normal. Pupils are equal, round, and reactive to light.   Neck: Neck supple.   Normal range of motion.  Cardiovascular:  Normal rate, regular rhythm, normal heart sounds and intact distal pulses.           Pulmonary/Chest: Breath sounds normal. No respiratory distress.   Abdominal: Abdomen is soft. He exhibits no distension. There is no abdominal tenderness.   Musculoskeletal:         General: No tenderness or edema. Normal range of motion.      Cervical back: Normal range of motion and neck supple.      Comments: Mild pain w/active and passive range of motion in bilateral knees.  No other abnl findings.     Neurological: He is alert and oriented to person, place, and time. He has normal strength. No sensory deficit.   Skin: Skin is warm and dry. Capillary refill takes less than 2 seconds.   Psychiatric: He has a normal mood and affect. His behavior is normal.         ED Course   Procedures  Labs Reviewed   CBC W/ AUTO DIFFERENTIAL - Abnormal       Result Value    WBC 4.56      RBC 4.09 (*)     Hemoglobin 11.5 (*)     Hematocrit 35.6 (*)     MCV 87      MCH 28.1      MCHC 32.3      RDW 15.1 (*)     Platelets 155      MPV 10.1      Immature Granulocytes 0.4      Gran # (ANC) 3.6      Immature Grans (Abs) 0.02      Lymph # 0.3 (*)     Mono # 0.5      Eos # 0.1      Baso # 0.02      nRBC 0      Gran % 78.8 (*)     Lymph % 6.1 (*)     Mono % 11.4      Eosinophil % 2.9      Basophil % 0.4      Differential Method Automated      Narrative:     Release to patient->Immediate   COMPREHENSIVE METABOLIC PANEL - Abnormal    Sodium 129 (*)     Potassium 5.3 (*)     Chloride 87 (*)     CO2 29      Glucose 144 (*)     BUN 59 (*)     Creatinine 5.8 (*)     Calcium  11.2 (*)     Total Protein 7.9      Albumin 3.0 (*)     Total Bilirubin 1.5 (*)     Alkaline Phosphatase 760 (*)     AST 61 (*)     ALT 44      eGFR 10.5 (*)     Anion Gap 13      Narrative:     Release to patient->Immediate   MAGNESIUM - Abnormal    Magnesium 2.7 (*)     Narrative:     Release to patient->Immediate   PHOSPHORUS - Abnormal    Phosphorus 6.0 (*)     Narrative:     Release to patient->Immediate   HEPATITIS C ANTIBODY    Hepatitis C Ab Non-reactive      Narrative:     Release to patient->Immediate   HIV 1 / 2 ANTIBODY    HIV 1/2 Ag/Ab Non-reactive      Narrative:     Release to patient->Immediate   TROPONIN I HIGH SENSITIVITY    Troponin I High Sensitivity 8      Narrative:     Release to patient->Immediate   HEPATITIS B SURFACE ANTIGEN    Hepatitis B Surface Ag Non-reactive          ECG Results              EKG 12-lead (Final result)        Collection Time Result Time QRS Duration OHS QTC Calculation    03/11/25 08:09:18 03/11/25 08:58:39 80 465                     Final result by Interface, Lab In [facility] (03/11/25 08:58:43)                   Narrative:    Test Reason : R53.1,    Vent. Rate :  83 BPM     Atrial Rate :  83 BPM     P-R Int : 166 ms          QRS Dur :  80 ms      QT Int : 396 ms       P-R-T Axes :  69 -46  72 degrees    QTcB Int : 465 ms    Normal sinus rhythm  Left anterior fascicular block  Low septal forces ; Abnormal R wave progression in the precordial leads  -Possible Septal infarct ,age undetermined  Abnormal ECG  When compared with ECG of 08-Jan-2025 21:32,  QRS duration has decreased  QRS voltage has decreased  Septal infarct is now slightly more possible  Confirmed by Liam Lindsay ([id number]) on 3/11/2025 8:58:35 AM    Referred By: AAAREFERRAL SELF           Confirmed By: Liam Lindsay                                  Imaging Results              X-Ray Knee 3 View Bilateral (Final result)  Result time 03/11/25 09:20:45   Procedure changed from X-Ray Knee 3 View Right     Final result by  Ezekiel Sinha MD (03/11/25 09:20:45)                   Impression:      See findings above.      Electronically signed by: Ezekiel Sinha MD  Date:    03/11/2025  Time:    09:20               Narrative:    EXAMINATION:  XR KNEE 3 VIEW BILATERAL    CLINICAL HISTORY:  Knee pain; Pain in unspecified knee    TECHNIQUE:  AP, lateral, and Merchant views of both knees were performed.    COMPARISON:  None.    FINDINGS:  Bones are demineralized.  Mild degenerative changes in both knees.  No severe cartilage space loss.  No acute displaced fracture.  No dislocation.  No sizeable joint effusion.  No unexpected radiopaque foreign body.                                       Medications   0.9% NaCl infusion ( Intravenous New Bag 3/11/25 1433)   sodium chloride 0.9% flush 10 mL (has no administration in time range)   melatonin tablet 6 mg (has no administration in time range)   ondansetron injection 4 mg (has no administration in time range)   acetaminophen tablet 1,000 mg (has no administration in time range)   sodium zirconium cyclosilicate packet 10 g (10 g Oral Given 3/11/25 1225)     Medical Decision Making  Catalino Mcfadden is a 59 y.o. male who presents to the emergency department following a mechanical fall w/ bilateral knee pain as detailed in HPI. On initial evaluation, patient in NAD.  His slightly hypertensive but otherwise VSS. EKG shows normal sinus rhythm. On exam, patient w/some slight speech difficulty, which is his baseline.  Patient has pain w/ active and passive range of motion in bilateral knees w/ left greater than right.  However, active and passive range of motion fully intact w/o crepitus or other abnl finding.  No ecchymoses or other skin changes noted.  Distal pulses intact bilaterally.  Otherwise, physical exam largely unremarkable.  Nephrology consulted for dialysis 2/2 metabolic derangements and missed dialysis appointment.  On re-evaluation, patient's pain improving.  Otherwise clinical status  largely unchanged.     Pertinent Labs:  CBC largely unremarkable.  CMP w/ hypokalemia, elevated BUN, creatinine 5.8, hypercalcemia of 11.2, elevated ALP, GFR of 10.5.  Phos elevated to 6.0, Mag elevated to 2.7.  I suspect that patient's metabolic derangements are 2/2 missed dialysis planning.  Otherwise, labs largely unremarkable/consistent w/ baseline.    Pertinent Imaging:  X-ray bilateral knees w/o fracture, dislocation, or other deformity noted.     Ddx including, but not limited to:  Fracture, soft tissue injury, metabolic derangement     Most likely Dx:  At this time, I have highest suspicion for soft tissue injury following a mechanical fall.  Patient also w/obvious metabolic derangements, but asymptomatic which is likely due to missed dialysis appointment.     Disposition:   Patient will be admitted to EDOU for dialysis. Discussed reason for admission and plan with patient and/or caregiver who expressed understanding and agreement.    Please see HPI, physical exam, ED course for additional details.    Amount and/or Complexity of Data Reviewed  Labs: ordered.  Radiology: ordered.    Risk  OTC drugs.  Prescription drug management.              Attending Attestation:   Physician Attestation Statement for Resident:  As the supervising MD   Physician Attestation Statement: I have personally seen and examined this patient.   I agree with the above history.  -:   As the supervising MD I agree with the above PE.     As the supervising MD I agree with the above treatment, course, plan, and disposition.                                           Clinical Impression:  Final diagnoses:  [R53.1] Weak  [M25.569] Knee pain  [N18.6, Z99.2] ESRD on hemodialysis  [E87.5] Hyperkalemia (Primary)          ED Disposition Condition    Observation                   James David MD  Resident  03/11/25 1720         [1]   Social History  Tobacco Use    Smoking status: Never    Smokeless tobacco: Never   Substance Use Topics     Alcohol use: Not Currently     Comment: occ rare beer    Drug use: No        Jeri Cummins MD  03/12/25 0710

## 2025-03-11 NOTE — PROGRESS NOTES
Patient arrived in on a stretcher to dialysis unit.   Report received from Ana Yepez per dialysis Flowsheet.     Hemodialysis initiated using the following:     Dialysis Access: LIZET AVF     Needle size: 15 gauge  Insertion with no complications. Tolerated well, flows good. Blood specimen obtained for Hep B surface Ag     Will Maintain telemetry and blood pressure monitoring throughout treatment.  Refer to dialysis flowsheet and MAR for details.

## 2025-03-11 NOTE — ASSESSMENT & PLAN NOTE
Outpatient HD Information:  -Dialysis modality: Hemodialysis  -Outpatient HD unit: [facility]  -Nephrologist: ?  -HD TX days: Tuesday/Thursday/Saturday, duration of treatment: 4 hrs  -Last HD TX prior to hospital admission:   -Dialysis access: LUE AV fistula   -Residual urine: Minium  -EDW: 79.9 kg    Plan/Recommendations  -HD today for mild hyperkalemia, to continue TTS schedule  -Will continue iHD thrice weekly while IP  -Hgb goal 10-11, hgb at goal  -Strict I&Os  -Pre & post HD weight  -Resume OP phos binders  -Renal diet if not NPO

## 2025-03-11 NOTE — ED TRIAGE NOTES
Pt presents to the ED via EMS from home, pt was getting in his car to go to dialysis, started feeling weak and fell to his knees. Denies LOC.

## 2025-03-11 NOTE — PROGRESS NOTES
ED Observation Unit  Progress Note      HPI   59-year-old male with pmhx significant for T2DM, ESRD HD TThS, HTN, HLD presenting for evaluation weakness and fall. He states earlier today his legs felt weak, causing him to fall to the ground. No documented loss of consciousness, but had difficulty getting up after fall. He has a history of weakness that he has been being worked up for outpatient. He is due for dialysis today. Last dialysis Saturday March 8th. He denies any chest pain, shortness of breath, nausea, vomiting, diarrhea. In the ED, CMP with Na 129, K 5.3, CO2 29, BUN 59, creatinine 5.8, consistent with ESRD. He was placed in observation. Nephrology consulted for HD in the setting of ESRD.      I reviewed the ED Provider Note dated 3/11/25 prior to my evaluation of this patient.  I reviewed all labs and imaging performed in the Main ED, prior to patient being placed in Observation. Patient was placed in the ED Observation Unit for ESRD on Hemodialysis.    Interval History   Patient states that he injured his left leg from the fall.  He has some pain in the back of the left knee.  He has a cane for ambulation.  As mentioned above, patient has had weakness in his legs for about a year and has been worked up for this on an outpatient basis.  Mildly hypertensive at 154/83.  Patient evaluated while on HD.  Tolerating well.    PMHx   Past Medical History:   Diagnosis Date    Cancer     Diabetes mellitus, type 2     Diabetic foot ulcer associated with diabetes mellitus due to underlying condition 07/16/2020    ESRD on hemodialysis     Hyperlipidemia     Hypertension     Obese       Past Surgical History:   Procedure Laterality Date    AV FISTULA PLACEMENT Left 07/06/2023    Procedure: CREATION, AV FISTULA;  Surgeon: Daniel Poon MD;  Location: [facility];  Service: Vascular;  Laterality: Left;  RN PREOP 6/28/2023  LABS DAY OF SURGERY    BONE BIOPSY Left 07/17/2020    Procedure: BIOPSY, BONE;  Surgeon: Ver[place]  RADHA Whitmore DPM;  Location: [facility] OR;  Service: Podiatry;  Laterality: Left;    CERVICAL DISC SURGERY  07/11/2016    COLONOSCOPY N/A 3/25/2024    Procedure: COLONOSCOPY;  Surgeon: Roopa Pérez MD;  Location: [facility] (4TH FLR);  Service: Endoscopy;  Laterality: N/A;  Ref By: Dr. Pérez   constipation prep  Diaylsis Patient Lab has been ordered  3/20-precall complete-MS    COLONOSCOPY N/A 4/8/2024    Procedure: COLONOSCOPY;  Surgeon: Roopa Pérez MD;  Location: [facility] (2ND FLR);  Service: Endoscopy;  Laterality: N/A;  Prep instructions sent via portal/given to pt in clinic  pt had to be r/s at  request  Dialysis Tues,Thurs,Sat-dw  Peg Prep-dw  4/2/24- Labs - dialysis /poor prep on 3/26 - PEG x 2 - extended prep /LVM for precall - ERW  4/5-LVM for precall-KPvt    COLONOSCOPY N/A 4/16/2024    Procedure: COLONOSCOPY;  Surgeon: Jarett Hill MD;  Location: [facility] (2ND FLR);  Service: Endoscopy;  Laterality: N/A;    DEBRIDEMENT Left 07/17/2020    Procedure: DEBRIDEMENT;  Surgeon: Verona Whitmore DPM;  Location: [facility] OR;  Service: Podiatry;  Laterality: Left;    DEBRIDEMENT OF FOOT Right     toes & plantar surface    ENDOBRONCHIAL ULTRASOUND N/A 3/1/2024    Procedure: ENDOBRONCHIAL ULTRASOUND (EBUS);  Surgeon: Francisco Fleming MD;  Location: [facility] 2ND FLR;  Service: Pulmonary;  Laterality: N/A;    ENDOSCOPIC MUCOSAL RESECTION OF COLON N/A 9/9/2024    Procedure: RESECTION, MUCOSA, COLON, ENDOSCOPIC;  Surgeon: Vikas Quinonez MD;  Location: [facility] (2ND FLR);  Service: Endoscopy;  Laterality: N/A;  6/11 portal-peg-dialysis T Th Sat-+K scheduled-.colonoscopy in 3 months with [facility] doc, [facility], removal of TI polyp and assess prior EMR sites. Cristiano-tt  7/19/24: rescheduled instructions sent via portal-GD  9/3-lvm for pre call-tb  9/4-pre call complete-    ESOPHAGEAL MANOMETRY WITH MEASUREMENT OF IMPEDANCE N/A 03/27/2023    Procedure: MANOMETRY, ESOPHAGUS, WITH IMPEDANCE MEASUREMENT;  Surgeon:  Roopa Pérez MD;  Location: [facility] ENDO (4TH FLR);  Service: Endoscopy;  Laterality: N/A;  Belching and rumination protocol please  instructions via portal - sm    ESOPHAGOGASTRODUODENOSCOPY N/A 12/16/2022    Procedure: EGD (ESOPHAGOGASTRODUODENOSCOPY);  Surgeon: Eren Francois MD;  Location: [facility];  Service: Endoscopy;  Laterality: N/A;  Pt. on Dialysis. K+ ordered prior to procedure.EC    ESOPHAGOGASTRODUODENOSCOPY N/A 12/5/2023    Procedure: EGD (ESOPHAGOGASTRODUODENOSCOPY);  Surgeon: Kash Johnston MD;  Location: [facility] ENDO;  Service: Endoscopy;  Laterality: N/A;    FRACTURE SURGERY Right     medial ankle, metal plate present    INJECTION OF ANESTHETIC AGENT AROUND NERVE Right 2/2/2024    Procedure: BLOCK, NERVE STELLATE GANGLION *HOLD ASPIRIN 5 DAYS PRIOR*;  Surgeon: Gokul Gonzalez MD;  Location: [facility] PAIN MGT;  Service: Pain Management;  Laterality: Right;  [phone]    INSERTION OF TUNNELED CENTRAL VENOUS CATHETER (CVC) WITH SUBCUTANEOUS PORT N/A 06/11/2021    Procedure: TUNNEL CATH INSERTION WITHOUT PORT;  Surgeon: Dosc Diagnostic Provider;  Location: [facility];  Service: Radiology;  Laterality: N/A;  11AM START---PHONE PREOP 6/10/21---COVID POSTIVE ON 7/2020---NO S/S    left leg orthopedic surgery Left     MUSCLE BIOPSY Left 11/4/2024    Procedure: BIOPSY, MUSCLE;  Surgeon: Adithya Lewis MD;  Location: [facility];  Service: Neurosurgery;  Laterality: Left;  Left quadriceps muscle biopsy, will need standard pathology protocol for muscle sample. MAC anesthetic with local anesthetic injection    RN PREOP 10/28/2024    REVISION OF ARTERIOVENOUS FISTULA Left 3/14/2024    Procedure: REVISION, AV FISTULA;  Surgeon: Daniel Poon MD;  Location: [facility];  Service: Vascular;  Laterality: Left;  RN preop 3/6/2024----NEED ORDERS    UPPER GASTROINTESTINAL ENDOSCOPY          Family Hx   Family History   Adopted: Yes   Problem Relation Name Age of Onset    Heart disease Father      Colon cancer  Neg Hx      Esophageal cancer Neg Hx      Colon polyps Neg Hx      Stomach cancer Neg Hx          Social Hx   Social History     Socioeconomic History    Marital status:     Number of children: 1   Tobacco Use    Smoking status: Never    Smokeless tobacco: Never   Substance and Sexual Activity    Alcohol use: Not Currently     Comment: occ rare beer    Drug use: No    Sexual activity: Not Currently   Social History Narrative    Caregiver Brothaaron Ann     Social Drivers of Health     Financial Resource Strain: Low Risk  (1/10/2025)    Overall Financial Resource Strain (CARDIA)     Difficulty of Paying Living Expenses: Not very hard   Food Insecurity: No Food Insecurity (1/10/2025)    Hunger Vital Sign     Worried About Running Out of Food in the Last Year: Never true     Ran Out of Food in the Last Year: Never true   Transportation Needs: No Transportation Needs (1/10/2025)    TRANSPORTATION NEEDS     Transportation : No   Physical Activity: Inactive (1/10/2025)    Exercise Vital Sign     Days of Exercise per Week: 0 days     Minutes of Exercise per Session: 0 min   Stress: No Stress Concern Present (1/10/2025)    [employer] - Occupational Stress Questionnaire     Feeling of Stress : Only a little   Recent Concern: Stress - Stress Concern Present (10/22/2024)    [employer] - Occupational Stress Questionnaire     Feeling of Stress : To some extent   Housing Stability: Unknown (1/10/2025)    Housing Stability Vital Sign     Unable to Pay for Housing in the Last Year: No     Homeless in the Last Year: No        Vital Signs   Vitals:    03/11/25 1700 03/11/25 1715 03/11/25 1730 03/11/25 1745   BP: (!) 158/81 (!) 157/84 (!) 155/83 (!) 154/83   BP Location:       Patient Position:       Pulse: 94 92 94 93   Resp:       Temp:       TempSrc:       SpO2:       Weight:       Height:            Review of Systems  Review of Systems   Musculoskeletal:   Positive for joint pain.   Neurological:  Positive for weakness.       Brief Physical Exam/Reassessment   Physical Exam  Vitals reviewed.   Constitutional:       General: He is not in acute distress.     Appearance: He is not diaphoretic.      Comments: Persistent hiccups noted   HENT:      Head: Normocephalic and atraumatic.   Cardiovascular:      Rate and Rhythm: Normal rate and regular rhythm.      Heart sounds: Heart sounds not distant. No murmur heard.     No friction rub. No gallop.      Arteriovenous access: Left arteriovenous access is present.  Pulmonary:      Effort: Pulmonary effort is normal. No respiratory distress.      Breath sounds: Normal breath sounds. No decreased breath sounds, wheezing, rhonchi or rales.   Chest:      Chest wall: No tenderness.   Musculoskeletal:      Cervical back: Neck supple.      Comments: FROM of L knee with some pain.  There is slight chronic appearing swelling to bilateral knees.    Skin:     General: Skin is warm and dry.   Neurological:      Mental Status: He is alert.   Psychiatric:         Mood and Affect: Mood and affect normal.         Labs/Imaging   Labs Reviewed   CBC W/ AUTO DIFFERENTIAL - Abnormal       Result Value    WBC 4.56      RBC 4.09 (*)     Hemoglobin 11.5 (*)     Hematocrit 35.6 (*)     MCV 87      MCH 28.1      MCHC 32.3      RDW 15.1 (*)     Platelets 155      MPV 10.1      Immature Granulocytes 0.4      Gran # (ANC) 3.6      Immature Grans (Abs) 0.02      Lymph # 0.3 (*)     Mono # 0.5      Eos # 0.1      Baso # 0.02      nRBC 0      Gran % 78.8 (*)     Lymph % 6.1 (*)     Mono % 11.4      Eosinophil % 2.9      Basophil % 0.4      Differential Method Automated      Narrative:     Release to patient->Immediate   COMPREHENSIVE METABOLIC PANEL - Abnormal    Sodium 129 (*)     Potassium 5.3 (*)     Chloride 87 (*)     CO2 29      Glucose 144 (*)     BUN 59 (*)     Creatinine 5.8 (*)     Calcium 11.2 (*)     Total Protein 7.9      Albumin 3.0 (*)      Total Bilirubin 1.5 (*)     Alkaline Phosphatase 760 (*)     AST 61 (*)     ALT 44      eGFR 10.5 (*)     Anion Gap 13      Narrative:     Release to patient->Immediate   MAGNESIUM - Abnormal    Magnesium 2.7 (*)     Narrative:     Release to patient->Immediate   PHOSPHORUS - Abnormal    Phosphorus 6.0 (*)     Narrative:     Release to patient->Immediate   HEPATITIS C ANTIBODY    Hepatitis C Ab Non-reactive      Narrative:     Release to patient->Immediate   HIV 1 / 2 ANTIBODY    HIV 1/2 Ag/Ab Non-reactive      Narrative:     Release to patient->Immediate   TROPONIN I HIGH SENSITIVITY    Troponin I High Sensitivity 8      Narrative:     Release to patient->Immediate   HEPATITIS B SURFACE ANTIGEN    Hepatitis B Surface Ag Non-reactive        Imaging Results              X-Ray Knee 3 View Bilateral (Final result)  Result time 03/11/25 09:20:45   Procedure changed from X-Ray Knee 3 View Right     Final result by Ezekiel Sinha MD (03/11/25 09:20:45)                   Impression:      See findings above.      Electronically signed by: Ezekiel Sinha MD  Date:    03/11/2025  Time:    09:20               Narrative:    EXAMINATION:  XR KNEE 3 VIEW BILATERAL    CLINICAL HISTORY:  Knee pain; Pain in unspecified knee    TECHNIQUE:  AP, lateral, and Merchant views of both knees were performed.    COMPARISON:  None.    FINDINGS:  Bones are demineralized.  Mild degenerative changes in both knees.  No severe cartilage space loss.  No acute displaced fracture.  No dislocation.  No sizeable joint effusion.  No unexpected radiopaque foreign body.                                       I reviewed all labs, imaging, EKGs.     Plan   ESRD on Hemodialysis  Knee pain, bilateral  -x-rays of bilateral knees show no acute findings. No evidence of infectious process. Arthritic changes noted. Pain control as needed.  -patient placed in EDOU pending hemodialysis. Nephrology consulted by ED provider.  -plan for HD then dc home     I have  discussed this case with SWATHI Willis.

## 2025-03-11 NOTE — SUBJECTIVE & OBJECTIVE
Past Medical History:   Diagnosis Date    Cancer     Diabetes mellitus, type 2     Diabetic foot ulcer associated with diabetes mellitus due to underlying condition 07/16/2020    ESRD on hemodialysis     Hyperlipidemia     Hypertension     Obese        Past Surgical History:   Procedure Laterality Date    AV FISTULA PLACEMENT Left 07/06/2023    Procedure: CREATION, AV FISTULA;  Surgeon: Daniel Poon MD;  Location: [facility] OR;  Service: Vascular;  Laterality: Left;  RN PREOP 6/28/2023  LABS DAY OF SURGERY    BONE BIOPSY Left 07/17/2020    Procedure: BIOPSY, BONE;  Surgeon: Verona Whitmore DPM;  Location: [facility] OR;  Service: Podiatry;  Laterality: Left;    CERVICAL DISC SURGERY  07/11/2016    COLONOSCOPY N/A 3/25/2024    Procedure: COLONOSCOPY;  Surgeon: Roopa Pérez MD;  Location: [facility] (4TH FLR);  Service: Endoscopy;  Laterality: N/A;  Ref By: Dr. Pérez   constipation prep  Diaylsis Patient Lab has been ordered  3/20-precall complete-MS    COLONOSCOPY N/A 4/8/2024    Procedure: COLONOSCOPY;  Surgeon: Roopa Pérez MD;  Location: [facility] (2ND FLR);  Service: Endoscopy;  Laterality: N/A;  Prep instructions sent via portal/given to pt in clinic  pt had to be r/s at  request  Dialysis Tues,Thurs,Sat-dw  Peg Prep-dw  4/2/24- Labs - dialysis /poor prep on 3/26 - PEG x 2 - extended prep /LVM for precall - ERW  4/5-LVM for precall-KPvt    COLONOSCOPY N/A 4/16/2024    Procedure: COLONOSCOPY;  Surgeon: Jarett Hill MD;  Location: [facility] (2ND FLR);  Service: Endoscopy;  Laterality: N/A;    DEBRIDEMENT Left 07/17/2020    Procedure: DEBRIDEMENT;  Surgeon: Verona Whitmore DPM;  Location: [facility] OR;  Service: Podiatry;  Laterality: Left;    DEBRIDEMENT OF FOOT Right     toes & plantar surface    ENDOBRONCHIAL ULTRASOUND N/A 3/1/2024    Procedure: ENDOBRONCHIAL ULTRASOUND (EBUS);  Surgeon: Francisco Fleming MD;  Location: [facility] OR 2ND FLR;  Service: Pulmonary;  Laterality: N/A;    ENDOSCOPIC  MUCOSAL RESECTION OF COLON N/A 9/9/2024    Procedure: RESECTION, MUCOSA, COLON, ENDOSCOPIC;  Surgeon: Vikas Quinonez MD;  Location: [facility] (2ND FLR);  Service: Endoscopy;  Laterality: N/A;  6/11 portal-peg-dialysis T Th Sat-+K scheduled-.colonoscopy in 3 months with [facility] doc, [facility], removal of TI polyp and assess prior EMR sites. Cristiano-tt  7/19/24: rescheduled instructions sent via portal-GD  9/3-lvm for pre call-tb  9/4-pre call complete-    ESOPHAGEAL MANOMETRY WITH MEASUREMENT OF IMPEDANCE N/A 03/27/2023    Procedure: MANOMETRY, ESOPHAGUS, WITH IMPEDANCE MEASUREMENT;  Surgeon: Roopa Pérez MD;  Location: [facility] (4TH FLR);  Service: Endoscopy;  Laterality: N/A;  Belching and rumination protocol please  instructions via portal - sm    ESOPHAGOGASTRODUODENOSCOPY N/A 12/16/2022    Procedure: EGD (ESOPHAGOGASTRODUODENOSCOPY);  Surgeon: Eren Francois MD;  Location: [facility];  Service: Endoscopy;  Laterality: N/A;  Pt. on Dialysis. K+ ordered prior to procedure.EC    ESOPHAGOGASTRODUODENOSCOPY N/A 12/5/2023    Procedure: EGD (ESOPHAGOGASTRODUODENOSCOPY);  Surgeon: Kash Johnston MD;  Location: [facility];  Service: Endoscopy;  Laterality: N/A;    FRACTURE SURGERY Right     medial ankle, metal plate present    INJECTION OF ANESTHETIC AGENT AROUND NERVE Right 2/2/2024    Procedure: BLOCK, NERVE STELLATE GANGLION *HOLD ASPIRIN 5 DAYS PRIOR*;  Surgeon: Gokul Gonzalez MD;  Location: [facility] [place] PAIN MGT;  Service: Pain Management;  Laterality: Right;  [phone]    INSERTION OF TUNNELED CENTRAL VENOUS CATHETER (CVC) WITH SUBCUTANEOUS PORT N/A 06/11/2021    Procedure: TUNNEL CATH INSERTION WITHOUT PORT;  Surgeon: Jose Manuel Diagnostic Provider;  Location: [facility] OR;  Service: Radiology;  Laterality: N/A;  11AM START---PHONE PREOP 6/10/21---COVID POSTIVE ON 7/2020---NO S/S    left leg orthopedic surgery Left     MUSCLE BIOPSY Left 11/4/2024    Procedure: BIOPSY, MUSCLE;  Surgeon: Adithya Lewis MD;  Location: [facility]  OR;  Service: Neurosurgery;  Laterality: Left;  Left quadriceps muscle biopsy, will need standard pathology protocol for muscle sample. MAC anesthetic with local anesthetic injection    RN PREOP 10/28/2024    REVISION OF ARTERIOVENOUS FISTULA Left 3/14/2024    Procedure: REVISION, AV FISTULA;  Surgeon: Daniel Poon MD;  Location: [facility] OR;  Service: Vascular;  Laterality: Left;  RN preop 3/6/2024----NEED ORDERS    UPPER GASTROINTESTINAL ENDOSCOPY         Review of patient's allergies indicates:  No Known Allergies  Current Facility-Administered Medications   Medication Frequency    0.9% NaCl infusion Once     Current Outpatient Medications   Medication    carvediloL (COREG) 12.5 MG tablet    chlorproMAZINE (THORAZINE) 25 MG tablet    gabapentin (NEURONTIN) 100 MG capsule    hydrALAZINE (APRESOLINE) 100 MG tablet    NIFEdipine (PROCARDIA XL) 90 MG (OSM) 24 hr tablet    senna-docusate 8.6-50 mg (PERICOLACE) 8.6-50 mg per tablet     Family History       Problem Relation (Age of Onset)    Heart disease Father          Tobacco Use    Smoking status: Never    Smokeless tobacco: Never   Substance and Sexual Activity    Alcohol use: Not Currently     Comment: occ rare beer    Drug use: No    Sexual activity: Not Currently     Review of Systems   Constitutional:  Negative for fever.   HENT:  Negative for congestion.    Respiratory:  Negative for shortness of breath.    Cardiovascular:  Negative for chest pain.   Gastrointestinal:  Negative for diarrhea, nausea and vomiting.   Neurological:  Positive for weakness.     Objective:     Vital Signs (Most Recent):  Temp: 97.9 °F (36.6 °C) (03/11/25 1415)  Pulse: 92 (03/11/25 1545)  Resp: 18 (03/11/25 1415)  BP: (!) 162/84 (03/11/25 1545)  SpO2: 100 % (03/11/25 1315) Vital Signs (24h Range):  Temp:  [97.6 °F (36.4 °C)-98.1 °F (36.7 °C)] 97.9 °F (36.6 °C)  Pulse:  [85-92] 92  Resp:  [18-20] 18  SpO2:  [99 %-100 %] 100 %  BP: (162-186)/(84-99) 162/84     Weight: 86.2 kg (190  lb) (03/11/25 0739)  Body mass index is 25.07 kg/m².  Body surface area is 2.11 meters squared.    No intake/output data recorded.     Physical Exam  Vitals and nursing note reviewed.   Constitutional:       General: He is not in acute distress.  HENT:      Head: Normocephalic.   Eyes:      Conjunctiva/sclera: Conjunctivae normal.   Cardiovascular:      Rate and Rhythm: Normal rate.   Pulmonary:      Effort: Pulmonary effort is normal. No respiratory distress.   Abdominal:      General: There is no distension.      Palpations: Abdomen is soft.   Musculoskeletal:      Right lower leg: No edema.      Left lower leg: No edema.   Skin:     General: Skin is warm and dry.   Neurological:      Mental Status: He is alert.   Psychiatric:         Mood and Affect: Mood normal.          Significant Labs:  CBC:   Recent Labs   Lab 03/11/25  0816   WBC 4.56   RBC 4.09*   HGB 11.5*   HCT 35.6*      MCV 87   MCH 28.1   MCHC 32.3     CMP:   Recent Labs   Lab 03/11/25  0816   *   CALCIUM 11.2*   ALBUMIN 3.0*   PROT 7.9   *   K 5.3*   CO2 29   CL 87*   BUN 59*   CREATININE 5.8*   ALKPHOS 760*   ALT 44   AST 61*   BILITOT 1.5*     All labs within the past 24 hours have been reviewed.

## 2025-03-11 NOTE — HPI
59-year-old male with pmhx significant for T2DM, ESRD HD TThS, HTN, HLD presenting for evaluation weakness and fall.  He states earlier today his legs felt weak, causing him to fall to the ground.  No documented loss of consciousness, but had difficulty getting up after fall.  He has a history of weakness that he has been being worked up for outpatient.  He is due for dialysis today.  Last dialysis Saturday March 8th.  He denies any chest pain, shortness of breath, nausea, vomiting, diarrhea.  In the ED, CMP with Na 129, K 5.3, CO2 29, BUN 59, creatinine 5.8, consistent with ESRD.  He was placed in observation.  Nephrology consulted for HD in the setting of ESRD.

## 2025-03-11 NOTE — H&P
ED Observation Unit  History and Physical      I assumed care of this patient from the Main ED at onset of observation time, 1510 on 03/11/2025.       History of Present Illness:    59-year-old male with pmhx significant for T2DM, ESRD HD TThS, HTN, HLD presenting for evaluation weakness and fall. He states earlier today his legs felt weak, causing him to fall to the ground. No documented loss of consciousness, but had difficulty getting up after fall. He has a history of weakness that he has been being worked up for outpatient. He is due for dialysis today. Last dialysis Saturday March 8th. He denies any chest pain, shortness of breath, nausea, vomiting, diarrhea. In the ED, CMP with Na 129, K 5.3, CO2 29, BUN 59, creatinine 5.8, consistent with ESRD. He was placed in observation. Nephrology consulted for HD in the setting of ESRD.     I reviewed the ED Provider Note dated 3/11/25 prior to my evaluation of this patient.  I reviewed all labs and imaging performed in the Main ED, prior to patient being placed in Observation. Patient was placed in the ED Observation Unit for ESRD on Hemodialysis.    PMHx   Past Medical History:   Diagnosis Date    Cancer     Diabetes mellitus, type 2     Diabetic foot ulcer associated with diabetes mellitus due to underlying condition 07/16/2020    ESRD on hemodialysis     Hyperlipidemia     Hypertension     Obese       Past Surgical History:   Procedure Laterality Date    AV FISTULA PLACEMENT Left 07/06/2023    Procedure: CREATION, AV FISTULA;  Surgeon: Daniel Poon MD;  Location: [facility] OR;  Service: Vascular;  Laterality: Left;  RN PREOP 6/28/2023  LABS DAY OF SURGERY    BONE BIOPSY Left 07/17/2020    Procedure: BIOPSY, BONE;  Surgeon: Verona Whitmore DPM;  Location: [facility] OR;  Service: Podiatry;  Laterality: Left;    CERVICAL DISC SURGERY  07/11/2016    COLONOSCOPY N/A 3/25/2024    Procedure: COLONOSCOPY;  Surgeon: Roopa Pérez MD;  Location: [facility] ([address] [place] [address]);   Service: Endoscopy;  Laterality: N/A;  Ref By: Dr. Pérez   constipation prep  Diaylsis Patient Lab has been ordered  3/20-precall complete-MS    COLONOSCOPY N/A 4/8/2024    Procedure: COLONOSCOPY;  Surgeon: Roopa Pérez MD;  Location: [facility] (2ND FLR);  Service: Endoscopy;  Laterality: N/A;  Prep instructions sent via portal/given to pt in clinic  pt had to be r/s at  request  Dialysis Tues,Thurs,Sat-dw  Peg Prep-dw  4/2/24- Labs - dialysis /poor prep on 3/26 - PEG x 2 - extended prep /LVM for precall - ERW  4/5-LVM for precall-KPvt    COLONOSCOPY N/A 4/16/2024    Procedure: COLONOSCOPY;  Surgeon: Jarett Hill MD;  Location: [facility] (2ND FLR);  Service: Endoscopy;  Laterality: N/A;    DEBRIDEMENT Left 07/17/2020    Procedure: DEBRIDEMENT;  Surgeon: Verona Whitmore DPM;  Location: [facility] OR;  Service: Podiatry;  Laterality: Left;    DEBRIDEMENT OF FOOT Right     toes & plantar surface    ENDOBRONCHIAL ULTRASOUND N/A 3/1/2024    Procedure: ENDOBRONCHIAL ULTRASOUND (EBUS);  Surgeon: Francisco Fleming MD;  Location: [facility] OR 2ND FLR;  Service: Pulmonary;  Laterality: N/A;    ENDOSCOPIC MUCOSAL RESECTION OF COLON N/A 9/9/2024    Procedure: RESECTION, MUCOSA, COLON, ENDOSCOPIC;  Surgeon: Vikas Quinonez MD;  Location: [facility] (2ND FLR);  Service: Endoscopy;  Laterality: N/A;  6/11 portal-peg-dialysis T Th Sat-+K scheduled-.colonoscopy in 3 months with [facility] doc, [facility], removal of TI polyp and assess prior EMR sites. Cristiano-tt  7/19/24: rescheduled instructions sent via portal-GD  9/3-lvm for pre call-tb  9/4-pre call complete-    ESOPHAGEAL MANOMETRY WITH MEASUREMENT OF IMPEDANCE N/A 03/27/2023    Procedure: MANOMETRY, ESOPHAGUS, WITH IMPEDANCE MEASUREMENT;  Surgeon: Roopa Pérez MD;  Location: [facility] (4TH FLR);  Service: Endoscopy;  Laterality: N/A;  Belching and rumination protocol please  instructions via portal - sm    ESOPHAGOGASTRODUODENOSCOPY N/A 12/16/2022    Procedure: EGD  (ESOPHAGOGASTRODUODENOSCOPY);  Surgeon: Eren Francois MD;  Location: [facility] ENDO;  Service: Endoscopy;  Laterality: N/A;  Pt. on Dialysis. K+ ordered prior to procedure.EC    ESOPHAGOGASTRODUODENOSCOPY N/A 12/5/2023    Procedure: EGD (ESOPHAGOGASTRODUODENOSCOPY);  Surgeon: Kash Johnston MD;  Location: [facility] ENDO;  Service: Endoscopy;  Laterality: N/A;    FRACTURE SURGERY Right     medial ankle, metal plate present    INJECTION OF ANESTHETIC AGENT AROUND NERVE Right 2/2/2024    Procedure: BLOCK, NERVE STELLATE GANGLION *HOLD ASPIRIN 5 DAYS PRIOR*;  Surgeon: Gokul Gonzalez MD;  Location: [facility] PAIN MGT;  Service: Pain Management;  Laterality: Right;  [phone]    INSERTION OF TUNNELED CENTRAL VENOUS CATHETER (CVC) WITH SUBCUTANEOUS PORT N/A 06/11/2021    Procedure: TUNNEL CATH INSERTION WITHOUT PORT;  Surgeon: Dosc Diagnostic Provider;  Location: [facility] OR;  Service: Radiology;  Laterality: N/A;  11AM START---PHONE PREOP 6/10/21---COVID POSTIVE ON 7/2020---NO S/S    left leg orthopedic surgery Left     MUSCLE BIOPSY Left 11/4/2024    Procedure: BIOPSY, MUSCLE;  Surgeon: Adithya Lewis MD;  Location: [facility];  Service: Neurosurgery;  Laterality: Left;  Left quadriceps muscle biopsy, will need standard pathology protocol for muscle sample. MAC anesthetic with local anesthetic injection    RN PREOP 10/28/2024    REVISION OF ARTERIOVENOUS FISTULA Left 3/14/2024    Procedure: REVISION, AV FISTULA;  Surgeon: Daniel Poon MD;  Location: [facility];  Service: Vascular;  Laterality: Left;  RN preop 3/6/2024----NEED ORDERS    UPPER GASTROINTESTINAL ENDOSCOPY          Family Hx   Family History   Adopted: Yes   Problem Relation Name Age of Onset    Heart disease Father      Colon cancer Neg Hx      Esophageal cancer Neg Hx      Colon polyps Neg Hx      Stomach cancer Neg Hx          Social Hx   Social History     Socioeconomic History    Marital status:     Number of children: 1   Tobacco Use     Smoking status: Never    Smokeless tobacco: Never   Substance and Sexual Activity    Alcohol use: Not Currently     Comment: occ rare beer    Drug use: No    Sexual activity: Not Currently   Social History Narrative    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     Social Drivers of Health     Financial Resource Strain: Low Risk  (1/10/2025)    Overall Financial Resource Strain (CARDIA)     Difficulty of Paying Living Expenses: Not very hard   Food Insecurity: No Food Insecurity (1/10/2025)    Hunger Vital Sign     Worried About Running Out of Food in the Last Year: Never true     Ran Out of Food in the Last Year: Never true   Transportation Needs: No Transportation Needs (1/10/2025)    TRANSPORTATION NEEDS     Transportation : No   Physical Activity: Inactive (1/10/2025)    Exercise Vital Sign     Days of Exercise per Week: 0 days     Minutes of Exercise per Session: 0 min   Stress: No Stress Concern Present (1/10/2025)    [employer] - Occupational Stress Questionnaire     Feeling of Stress : Only a little   Recent Concern: Stress - Stress Concern Present (10/22/2024)    [employer] - Occupational Stress Questionnaire     Feeling of Stress : To some extent   Housing Stability: Unknown (1/10/2025)    Housing Stability Vital Sign     Unable to Pay for Housing in the Last Year: No     Homeless in the Last Year: No        Vital Signs   Vitals:    03/11/25 1545 03/11/25 1600 03/11/25 1615 03/11/25 1630   BP: (!) 162/84 (!) 159/86 (!) 163/89 (!) 159/83   BP Location:       Patient Position:       Pulse: 92 93 93 94   Resp:       Temp:       TempSrc:       SpO2:       Weight:       Height:            Review of Systems  Per HPI    Physical Exam  Vitals and nursing note reviewed.   Constitutional:       General: He is not in acute distress.  HENT:      Head: Normocephalic.   Eyes:      Conjunctiva/sclera: Conjunctivae normal.   Cardiovascular:      Rate and Rhythm: Normal rate.    Pulmonary:      Effort: Pulmonary effort is normal. No respiratory distress.   Abdominal:      General: There is no distension.      Palpations: Abdomen is soft.   Musculoskeletal:      Right lower leg: No edema.      Left lower leg: No edema.   Skin:     General: Skin is warm and dry.   Neurological:      Mental Status: He is alert.   Psychiatric:         Mood and Affect: Mood normal.     Medications:   Scheduled Meds:   0.9% NaCl   Intravenous Once     Continuous Infusions:  PRN Meds:.      Assessment/Plan:  ESRD on Hemodialysis  Knee pain, bilateral  -x-rays of bilateral knees show no acute findings. No evidence of infectious process. Arthritic changes noted. Pain control as needed.  -patient placed in EDOU pending hemodialysis. Nephrology consulted by ED provider.  -plan for HD then dc home    Case was discussed with the ED provider, Dr. Cummins and with Nephrology.

## 2025-03-12 ENCOUNTER — TELEPHONE (OUTPATIENT)
Dept: VASCULAR SURGERY | Facility: CLINIC | Age: 60
End: 2025-03-12

## 2025-03-12 VITALS
WEIGHT: 190 LBS | TEMPERATURE: 98 F | BODY MASS INDEX: 25.18 KG/M2 | RESPIRATION RATE: 18 BRPM | HEIGHT: 73 IN | SYSTOLIC BLOOD PRESSURE: 157 MMHG | HEART RATE: 74 BPM | OXYGEN SATURATION: 98 % | DIASTOLIC BLOOD PRESSURE: 78 MMHG

## 2025-03-12 NOTE — PROGRESS NOTES
HD tx complete  2 L removed over 3.5 hours, tolerated well  Blood returned, needles removed x2, gauze and tape applied. Pressure held to each site for 15 minutes. Hemostasis achieved  Report given to Ana Sheikh transferred from unit via stretcher by transport

## 2025-03-12 NOTE — DISCHARGE INSTRUCTIONS
Future Appointments   Date Time Provider Department Center   3/13/2025  2:30 PM VASCULAR ULTRASOUND, [facility] EKG [facility]   3/13/2025  3:15 PM Timothy Martinez MD [facility] VAS [facility]   3/19/2025 11:00 AM Aviva Martinez DPM [facility]

## 2025-03-12 NOTE — DISCHARGE SUMMARY
ED Observation Unit  Discharge Summary        History of Present Illness:    59-year-old male with pmhx significant for T2DM, ESRD HD TThS, HTN, HLD presenting for evaluation weakness and fall. He states earlier today his legs felt weak, causing him to fall to the ground. No documented loss of consciousness, but had difficulty getting up after fall. He has a history of weakness that he has been being worked up for outpatient. He is due for dialysis today. Last dialysis Saturday March 8th. He denies any chest pain, shortness of breath, nausea, vomiting, diarrhea. In the ED, CMP with Na 129, K 5.3, CO2 29, BUN 59, creatinine 5.8, consistent with ESRD. He was placed in observation. Nephrology consulted for HD in the setting of ESRD.      I reviewed the ED Provider Note dated 3/11/25 prior to my evaluation of this patient.  I reviewed all labs and imaging performed in the Main ED, prior to patient being placed in Observation. Patient was placed in the ED Observation Unit for ESRD on Hemodialysis.    Observation Course:    Patient was placed in observation to receive his dialysis treatment.  Received full dialysis treatment without complication.  He was discharged in stable condition.    Consultants:    Nephrology    Final Diagnosis:  ESRD on hemodialysis    Discharge Condition: Good    Disposition: Home or Self Care     Time spent on the discharge of the patient including review of hospital course with the patient. reviewing discharge medications and arranging follow-up care 35 minutes.  Patient was seen and examined on the date of discharge and determined to be suitable for discharge.    Follow Up:  Future Appointments   Date Time Provider Department Center   3/13/2025  2:30 PM VASCULAR ULTRASOUND, [facility] EKG [facility]   3/13/2025  3:15 PM Tiomthy Martinez MD [facility] VAS TAM[facility]   3/19/2025 11:00 AM Aviva Martinez, [facility]

## 2025-03-12 NOTE — TELEPHONE ENCOUNTER
Received message pt.was in ER and discharged. Pt. cancelled his appt. with vascular provider scheduled for tomorrow and rescheduled for 3/27/2025. He has an ultrasound test scheduled tomorrow. I called and spoke with pt. and unable to make appts. I rescheduled his appt.with vascular provider for Friday and message sent to dept. to see if pt. ultrasound can be done prior to vascular appt. Pt. aware I will call home once I get a response and to keep his phone close by. Pt.verbalized understanding and instructed if has any issues prior to go to the ER. Once I get appts. Changed will call and speak with dialysis nurse to let her know. Message sent to provider.

## 2025-03-12 NOTE — ED NOTES
Bed: [facility]  Expected date:   Expected time:   Means of arrival:   Comments:

## 2025-03-13 ENCOUNTER — DOCUMENTATION ONLY (OUTPATIENT)
Dept: TRANSPLANT | Facility: CLINIC | Age: 60
End: 2025-03-13

## 2025-03-13 ENCOUNTER — TELEPHONE (OUTPATIENT)
Dept: VASCULAR SURGERY | Facility: CLINIC | Age: 60
End: 2025-03-13

## 2025-03-13 NOTE — TELEPHONE ENCOUNTER
Called pt. regarding ultrasound appt. scheduled prior to his appt. with vascular tomorrow. No answer. Left message with appts. and to call our office to confirm. Called [facility] and spoke with Jocelyn. Pt. at dialysis today and Jammie gave him information with appts.and pt.confirmed.

## 2025-03-14 ENCOUNTER — HOSPITAL ENCOUNTER (OUTPATIENT)
Dept: CARDIOLOGY | Facility: HOSPITAL | Age: 60
Discharge: HOME | End: 2025-03-14
Attending: NURSE PRACTITIONER

## 2025-03-14 ENCOUNTER — OFFICE VISIT (OUTPATIENT)
Dept: VASCULAR SURGERY | Facility: CLINIC | Age: 60
End: 2025-03-14

## 2025-03-14 VITALS
BODY MASS INDEX: 25.19 KG/M2 | HEART RATE: 111 BPM | HEIGHT: 73 IN | DIASTOLIC BLOOD PRESSURE: 76 MMHG | SYSTOLIC BLOOD PRESSURE: 123 MMHG | WEIGHT: 190.06 LBS

## 2025-03-14 DIAGNOSIS — T82.858A ARTERIOVENOUS FISTULA STENOSIS, INITIAL ENCOUNTER: Primary | ICD-10-CM

## 2025-03-14 DIAGNOSIS — Z99.2 ESRD ON HEMODIALYSIS: ICD-10-CM

## 2025-03-14 DIAGNOSIS — T82.858D ARTERIOVENOUS FISTULA STENOSIS, SUBSEQUENT ENCOUNTER: ICD-10-CM

## 2025-03-14 DIAGNOSIS — N18.6 ESRD ON HEMODIALYSIS: ICD-10-CM

## 2025-03-14 LAB
HD ANASTAMOSIS VESSEL: NORMAL
HD FISTULA OUTFLOW VEIN LOCATION: NORMAL
HD FISTULA OUTFLOW VEIN VESSEL: NORMAL
HD INFLOW ARTERY VESSEL: NORMAL
RIGHT DIS GRAFT PSV: 49 CM/ SEC
RIGHT INFLOW PSV: 260 CM/S
RIGHT MID GRAFT DEPTH: 0.4 CM
RIGHT MID GRAFT DIA: 1.1 CM
RIGHT MID GRAFT PSV: 50 CM/S
RIGHT OUTFLOW VEIN PSV: 433 CM/ SEC
RIGHT PROX ANA PSV: 270 CM/S
RIGHT PROX GRAFT PSV: 138 CM/S
RIGHT VOLUME FLOW DIA: 1 CM
RIGHT VOLUME FLOW PSV: 981 ML/MIN

## 2025-03-14 PROCEDURE — 93990 DOPPLER FLOW TESTING: CPT | Mod: 26,,, | Performed by: SURGERY

## 2025-03-14 PROCEDURE — 99213 OFFICE O/P EST LOW 20 MIN: CPT | Mod: PBBFAC,25 | Performed by: SURGERY

## 2025-03-14 PROCEDURE — 93990 DOPPLER FLOW TESTING: CPT | Mod: TC

## 2025-03-14 PROCEDURE — 99999 PR PBB SHADOW E&M-EST. PATIENT-LVL III: CPT | Mod: PBBFAC,,, | Performed by: SURGERY

## 2025-03-14 RX ORDER — CEFAZOLIN SODIUM 2 G/50ML
2 SOLUTION INTRAVENOUS ONCE
OUTPATIENT
Start: 2025-03-14 | End: 2025-03-14

## 2025-03-14 NOTE — PROGRESS NOTES
HPI:  Catalino Mcfadden is a 59 y.o. male with   Problem List       Patient Active Problem List   Diagnosis    Type 2 diabetes mellitus with stage 5 chronic kidney disease    HTN (hypertension)    Hyperlipidemia, acquired    ED (erectile dysfunction)    History of diabetic ulcer of foot    Osteomyelitis of second toe of left foot    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    Long term (current) use of antibiotics    Acute osteomyelitis of metatarsal bone of left foot    Diabetic ulcer of toe of left foot    Hyponatremia    Osteomyelitis of left foot    Hypoalbuminemia    Healed ulcer of left foot on examination    Iron deficiency anemia    Metabolic acidosis    Anemia due to chronic kidney disease, on chronic dialysis    Diabetic ulcer of left midfoot associated with type 2 diabetes mellitus, with fat layer exposed    Type II diabetes mellitus with neurological manifestations    Foot ulcer, right, with fat layer exposed    Foot ulceration, left, with fat layer exposed    ESRD on hemodialysis    Malignant renovascular hypertension    Calcified granuloma of lung    Abnormal findings on diagnostic imaging of lung    Tracheobronchomalacia    Intractable hiccups    Serum total bilirubin elevated    Lower extremity weakness    Abnormal MRI, lumbar spine    Goals of care, counseling/discussion    DJD (degenerative joint disease)    Complete lesion of L1 level of lumbar spinal cord    Debility    Diffuse lymphadenopathy    Lytic bone lesions on xray    Hepatic lesion    Splenic lesion    Moderate protein-calorie malnutrition    GI bleed    Acute blood loss anemia    Hypokalemia    Hypertensive urgency       being managed by PCP and specialists who is in need for long term dialysis access.  Patient is R handed.  Has been on HD for months.  No previous access surgery.  No defibrillator or pacemaker.  No recent UTI.    No extremity pain and swelling.      no MI  no Stroke  yes DM  yes HTN  no Lupus  no nephropathy  Tobacco use: denies     6/2023: Presents for f/u.  No new issues.  L foot wound has healed.     8/2023:  s/p LUE BC AVF 7/6/23.  Recovered well.  No HD US today.     10/2023:  began using AVF one needle x 1 successfully     10/2023:  DIFFICULTY WITH ACCESS.     2/2024:  unable to make surgery 11/2023.  Using cath for dialysis, occasional AVF.     5/2024:  high pressures at HD, using AVF 2 weeks.    10/2024:  c/o prolonged bleeding.    3/2025: did not make his fistulogram, cont to have prolonged bleeding.          Past Medical History:   Diagnosis Date    Cancer      Diabetes mellitus, type 2      Diabetic foot ulcer associated with diabetes mellitus due to underlying condition 07/16/2020    ESRD on hemodialysis      Hyperlipidemia      Hypertension      Obese              Past Surgical History:   Procedure Laterality Date    AV FISTULA PLACEMENT Left 07/06/2023     Procedure: CREATION, AV FISTULA;  Surgeon: Daniel Poon MD;  Location: [facility] OR;  Service: Vascular;  Laterality: Left;  RN PREOP 6/28/2023  LABS DAY OF SURGERY    BONE BIOPSY Left 07/17/2020     Procedure: BIOPSY, BONE;  Surgeon: Verona Whitmore DPM;  Location: [facility] OR;  Service: Podiatry;  Laterality: Left;    CERVICAL DISC SURGERY   07/11/2016    COLONOSCOPY N/A 3/25/2024     Procedure: COLONOSCOPY;  Surgeon: Roopa Pérez MD;  Location: [facility] (4TH FLR);  Service: Endoscopy;  Laterality: N/A;  Ref By: Dr. Pérez   constipation prep  Diaylsis Patient Lab has been ordered  3/20-precall complete-MS    COLONOSCOPY N/A 4/8/2024     Procedure: COLONOSCOPY;  Surgeon: Roopa Pérez MD;  Location: [facility] (2ND FLR);  Service: Endoscopy;  Laterality: N/A;  Prep instructions sent via portal/given to pt in clinic  pt had to be r/s at  request  Dialysis Irene Arguello,Sat-dw  Peg Prep-dw  4/2/24- Labs - dialysis /poor prep on 3/26 - PEG x 2 - extended prep /LVM for precall -  ERW  4/5-LVM for precall-KPvt    COLONOSCOPY N/A 4/16/2024     Procedure: COLONOSCOPY;  Surgeon: Jaertt Hill MD;  Location: [facility] (2ND FLR);  Service: Endoscopy;  Laterality: N/A;    DEBRIDEMENT Left 07/17/2020     Procedure: DEBRIDEMENT;  Surgeon: Verona Whitmore DPM;  Location: [facility] OR;  Service: Podiatry;  Laterality: Left;    DEBRIDEMENT OF FOOT Right       toes & plantar surface    ENDOBRONCHIAL ULTRASOUND N/A 3/1/2024     Procedure: ENDOBRONCHIAL ULTRASOUND (EBUS);  Surgeon: Francisco Fleming MD;  Location: [facility] OR 2ND FLR;  Service: Pulmonary;  Laterality: N/A;    ESOPHAGEAL MANOMETRY WITH MEASUREMENT OF IMPEDANCE N/A 03/27/2023     Procedure: MANOMETRY, ESOPHAGUS, WITH IMPEDANCE MEASUREMENT;  Surgeon: Roopa Pérez MD;  Location: [facility] (4TH FLR);  Service: Endoscopy;  Laterality: N/A;  Belching and rumination protocol please  instructions via portal - sm    ESOPHAGOGASTRODUODENOSCOPY N/A 12/16/2022     Procedure: EGD (ESOPHAGOGASTRODUODENOSCOPY);  Surgeon: Eren Francois MD;  Location: [facility];  Service: Endoscopy;  Laterality: N/A;  Pt. on Dialysis. K+ ordered prior to procedure.EC    ESOPHAGOGASTRODUODENOSCOPY N/A 12/5/2023     Procedure: EGD (ESOPHAGOGASTRODUODENOSCOPY);  Surgeon: Kash Johnston MD;  Location: [facility];  Service: Endoscopy;  Laterality: N/A;    FRACTURE SURGERY Right       medial ankle, metal plate present    INJECTION OF ANESTHETIC AGENT AROUND NERVE Right 2/2/2024     Procedure: BLOCK, NERVE STELLATE GANGLION *HOLD ASPIRIN 5 DAYS PRIOR*;  Surgeon: Gokul Gonzalez MD;  Location: [facility] PAIN [facility]GT;  Service: Pain Management;  Laterality: Right;  [phone]    INSERTION OF TUNNELED CENTRAL VENOUS CATHETER (CVC) WITH SUBCUTANEOUS PORT N/A 06/11/2021     Procedure: TUNNEL CATH INSERTION WITHOUT PORT;  Surgeon: Dosc Diagnostic Provider;  Location: [facility] OR;  Service: Radiology;  Laterality: N/A;  11AM START---PHONE PREOP 6/10/21---COVID POSTIVE ON 7/2020---NO  S/S    left leg orthopedic surgery Left      REVISION OF ARTERIOVENOUS FISTULA Left 3/14/2024     Procedure: REVISION, AV FISTULA;  Surgeon: Daniel Poon MD;  Location: [facility];  Service: Vascular;  Laterality: Left;  RN preop 3/6/2024----NEED ORDERS    UPPER GASTROINTESTINAL ENDOSCOPY                 Family History   Adopted: Yes   Problem Relation Name Age of Onset    Heart disease Father        Colon cancer Neg Hx        Esophageal cancer Neg Hx        Colon polyps Neg Hx        Stomach cancer Neg Hx          Social History            Socioeconomic History    Marital status: Other    Number of children: 1   Tobacco Use    Smoking status: Never    Smokeless tobacco: Never   Substance and Sexual Activity    Alcohol use: Not Currently       Comment: occ rare beer    Drug use: No    Sexual activity: Not Currently   Social History Narrative     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      Social Determinants of Health           Financial Resource Strain: Medium Risk (4/16/2024)     Overall Financial Resource Strain (CARDIA)      Difficulty of Paying Living Expenses: Somewhat hard   Food Insecurity: No Food Insecurity (4/16/2024)     Hunger Vital Sign      Worried About Running Out of Food in the Last Year: Never true      Ran Out of Food in the Last Year: Never true   Transportation Needs: No Transportation Needs (4/16/2024)     PRAPARE - Transportation      Lack of Transportation (Medical): No      Lack of Transportation (Non-Medical): No   Physical Activity: Inactive (4/16/2024)     Exercise Vital Sign      Days of Exercise per Week: 0 days      Minutes of Exercise per Session: 0 min   Stress: Stress Concern Present (4/16/2024)     [employer] [place] [employer] - Occupational Stress Questionnaire      Feeling of Stress : To some extent   Housing Stability: Low Risk  (4/16/2024)     Housing Stability Vital Sign      Unable to Pay for Housing in the Last Year: No      Homeless in the Last Year: No        Current  Medications      Current Outpatient Medications:     hydrALAZINE (APRESOLINE) 100 MG tablet, Take 1 tablet (100 mg total) by mouth 3 (three) times daily., Disp: 270 tablet, Rfl: 3    NIFEdipine (PROCARDIA XL) 90 MG (OSM) 24 hr tablet, Take 1 tablet (90 mg total) by mouth once daily., Disp: 90 tablet, Rfl: 3        REVIEW OF SYSTEMS:  General: No fevers or chills; ENT: No sore throat; Allergy and Immunology: no persistent infections; Hematological and Lymphatic: No history of bleeding or easy bruising; Endocrine: negative; Respiratory: no cough, shortness of breath, or wheezing; Cardiovascular: no chest pain or dyspnea on exertion; Gastrointestinal: no abdominal pain/back, change in bowel habits, or bloody stools; Genito-Urinary: no dysuria, trouble voiding, or hematuria; Musculoskeletal: negative; Neurological: no TIA or stroke symptoms; Psychiatric: no nervousness, anxiety or depression.     PHYSICAL EXAM:   Pulse: 86       General appearance:          Alert, well-appearing, and in no distress.  Oriented to person, place, and time                    Neurological:          Normal speech, no focal findings noted; CN II - XII grossly intact. BUE with sensation to light touch.            Musculoskeletal:          Digits/nail without cyanosis/clubbing.  Strength 5/5 BUE.                               Neck:         Supple, no significant adenopathy, no carotid bruit can be auscultated                             Chest:          Clear to auscultation, no wheezes, rales or rhonchi, symmetric air entry. No use of accessory muscles                          Cardiac:         Normal rate and regular rhythm, S1 and S2 normal                       Abdomen:         Soft, nontender, nondistended, no masses or organomegaly, no hernia                                      No rebound tenderness noted; bowel sounds normal                                      Pulsatile aortic mass is non palpable.                                      No  "groin adenopathy                 Extremities:            2 + R radial pulse, 2 + L radial pulse                                      2 + R brachial pulse, 2 + L brachial pulse                                      no RUE edema, no LUE edema                                      neg Dutch's test RUE, neg Dutch's test LUE, +thrill LUE AVF, inc well healed                          Skin:   No tissue loss     LAB RESULTS:  No results found for: "CBC"        Lab Results   Component Value Date     LABPROT 12.1 02/26/2024     INR 1.1 02/26/2024            Lab Results   Component Value Date      (L) 05/06/2024     K 5.0 05/06/2024     CL 97 05/06/2024     CO2 24 05/06/2024     GLU 94 05/06/2024     BUN 43 (H) 05/06/2024     CREATININE 4.9 (H) 05/06/2024     CALCIUM 10.1 05/06/2024     ANIONGAP 11 05/06/2024     EGFRNONAA 9.0 (A) 02/23/2022            Lab Results   Component Value Date     WBC 5.95 05/06/2024     RBC 3.43 (L) 05/06/2024     HGB 10.4 (L) 05/06/2024     HCT 31.9 (L) 05/06/2024     MCV 93 05/06/2024     MCH 30.3 05/06/2024     MCHC 32.6 05/06/2024     RDW 15.9 (H) 05/06/2024      05/06/2024     MPV 10.5 05/06/2024     GRAN 4.4 05/06/2024     GRAN 73.3 (H) 05/06/2024     LYMPH 0.8 (L) 05/06/2024     LYMPH 12.8 (L) 05/06/2024     MONO 0.7 05/06/2024     MONO 11.1 05/06/2024     EOS 0.1 05/06/2024     BASO 0.03 05/06/2024     EOSINOPHIL 1.8 05/06/2024     BASOPHIL 0.5 05/06/2024     DIFFMETHOD Automated 05/06/2024      .        Lab Results   Component Value Date     HGBA1C 4.1 05/06/2024         IMAGING:  All pertinent imaging has been reviewed and interpreted independently.     Adequate vein BUE 2022     Left upper extremity dialysis ultrasound shows no hemodynamically significant stenosis.  Flow is adequate.      3/2025: Left upper extremity dialysis ultrasound shows 50-69% cephalic arch stenosis and cephalic confluence hemodynamically significant stenosis.  Flow is 981 ml/min.         "     IMP/PLAN:  59 y.o. male with   Problem List       Patient Active Problem List   Diagnosis    Type 2 diabetes mellitus with stage 5 chronic kidney disease    HTN (hypertension)    Hyperlipidemia, acquired    ED (erectile dysfunction)    History of diabetic ulcer of foot    Osteomyelitis of second toe of left foot    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    Long term (current) use of antibiotics    Acute osteomyelitis of metatarsal bone of left foot    Diabetic ulcer of toe of left foot    Hyponatremia    Osteomyelitis of left foot    Hypoalbuminemia    Healed ulcer of left foot on examination    Iron deficiency anemia    Metabolic acidosis    Anemia due to chronic kidney disease, on chronic dialysis    Diabetic ulcer of left midfoot associated with type 2 diabetes mellitus, with fat layer exposed    Type II diabetes mellitus with neurological manifestations    Foot ulcer, right, with fat layer exposed    Foot ulceration, left, with fat layer exposed    ESRD on hemodialysis    Malignant renovascular hypertension    Calcified granuloma of lung    Abnormal findings on diagnostic imaging of lung    Tracheobronchomalacia    Intractable hiccups    Serum total bilirubin elevated    Lower extremity weakness    Abnormal MRI, lumbar spine    Goals of care, counseling/discussion    DJD (degenerative joint disease)    Complete lesion of L1 level of lumbar spinal cord    Debility    Diffuse lymphadenopathy    Lytic bone lesions on xray    Hepatic lesion    Splenic lesion    Moderate protein-calorie malnutrition    GI bleed    Acute blood loss anemia    Hypokalemia    Hypertensive urgency       being managed by PCP and specialists who is here today for evaluation of long term HD access.     -s/p LUE AVF superficialization 3/15/24 and AVF use with high pressures, did not make his fistulogram, continues with oozing - rec LUE fistulogram, radial  access    I spent 11 minutes evaluating this patient and greater than 50% of the time was spent counseling, coordinator care and discussing the plan of care.  All questions were answered and patient stated understanding with agreement with the above treatment plan.    Daniel Poon M.D., R.P.V.ANTONI  [facility]

## 2025-03-19 DIAGNOSIS — N18.6 ESRD ON HEMODIALYSIS: ICD-10-CM

## 2025-03-19 DIAGNOSIS — Z99.2 ESRD ON HEMODIALYSIS: ICD-10-CM

## 2025-03-19 DIAGNOSIS — N18.6 ESRD (END STAGE RENAL DISEASE): Primary | ICD-10-CM

## 2025-03-25 ENCOUNTER — TELEPHONE (OUTPATIENT)
Dept: VASCULAR SURGERY | Facility: CLINIC | Age: 60
End: 2025-03-25

## 2025-03-25 NOTE — TELEPHONE ENCOUNTER
----- Message from Med Assistant Saida sent at 3/25/2025  9:32 AM CDT -----  Who called:Tara with [facility] What is the request in detail:Insurance elapsed and needs to discuss Can the clinic reply by [facility]? NoWould the patient rather a call back or a response via [facility]? Call [facility] call back number:[phone] Information:Can speak to Chayito or Tara Thank you.

## 2025-03-25 NOTE — TELEPHONE ENCOUNTER
----- Message from Carley sent at 3/25/2025  3:14 PM CDT -----  Regarding: self  Who called: selfWhat is the request in detail: pt is calling because he needs to know what time his procedure is tomorrow. There is a note stating that it may need to get rescheduled due to insurance and pt is trying to get an updateCan the clinic reply by [facility]? NoWould the patient rather a call back or a response via [facility]? Call [facility] call back number: [phone] Information:Thank you.

## 2025-03-25 NOTE — TELEPHONE ENCOUNTER
Called and spoke with pt. Wants to know arrival time and where he needs to go for procedure tomorrow. Pt.currently does not have insurance and set up financial aid with [facility] with the help of  at dialysis treatment today. Spoke with Rebecca in pre-op and  then with  .  called Rebecca and discussed where pt.to have procedure rtomorrow.  states Rebecca will call pt. and give him information.

## 2025-03-25 NOTE — TELEPHONE ENCOUNTER
Called [facility] and spoke with Chayito. States pt.insurance lapsed and currently uninsured. He has fistulagram procedure scheduled for tomorrow.with vascular provider and may need to reschedule until new insurance statrts. Message sent to provider for further advice.

## 2025-03-28 ENCOUNTER — OFFICE VISIT (OUTPATIENT)
Dept: VASCULAR SURGERY | Facility: CLINIC | Age: 60
End: 2025-03-28

## 2025-03-28 VITALS
DIASTOLIC BLOOD PRESSURE: 77 MMHG | BODY MASS INDEX: 23.49 KG/M2 | HEIGHT: 73 IN | SYSTOLIC BLOOD PRESSURE: 140 MMHG | WEIGHT: 177.25 LBS | HEART RATE: 96 BPM

## 2025-03-28 DIAGNOSIS — N18.6 ESRD ON HEMODIALYSIS: Primary | ICD-10-CM

## 2025-03-28 DIAGNOSIS — Z99.2 ESRD ON HEMODIALYSIS: Primary | ICD-10-CM

## 2025-03-28 PROCEDURE — 99999 PR PBB SHADOW E&M-EST. PATIENT-LVL III: CPT | Mod: PBBFAC,,, | Performed by: SURGERY

## 2025-03-28 PROCEDURE — 99213 OFFICE O/P EST LOW 20 MIN: CPT | Mod: PBBFAC | Performed by: SURGERY

## 2025-03-28 NOTE — PROGRESS NOTES
HPI:  Catalino Mcfadden is a 59 y.o. male with   Problem List       Patient Active Problem List   Diagnosis    Type 2 diabetes mellitus with stage 5 chronic kidney disease    HTN (hypertension)    Hyperlipidemia, acquired    ED (erectile dysfunction)    History of diabetic ulcer of foot    Osteomyelitis of second toe of left foot    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    Long term (current) use of antibiotics    Acute osteomyelitis of metatarsal bone of left foot    Diabetic ulcer of toe of left foot    Hyponatremia    Osteomyelitis of left foot    Hypoalbuminemia    Healed ulcer of left foot on examination    Iron deficiency anemia    Metabolic acidosis    Anemia due to chronic kidney disease, on chronic dialysis    Diabetic ulcer of left midfoot associated with type 2 diabetes mellitus, with fat layer exposed    Type II diabetes mellitus with neurological manifestations    Foot ulcer, right, with fat layer exposed    Foot ulceration, left, with fat layer exposed    ESRD on hemodialysis    Malignant renovascular hypertension    Calcified granuloma of lung    Abnormal findings on diagnostic imaging of lung    Tracheobronchomalacia    Intractable hiccups    Serum total bilirubin elevated    Lower extremity weakness    Abnormal MRI, lumbar spine    Goals of care, counseling/discussion    DJD (degenerative joint disease)    Complete lesion of L1 level of lumbar spinal cord    Debility    Diffuse lymphadenopathy    Lytic bone lesions on xray    Hepatic lesion    Splenic lesion    Moderate protein-calorie malnutrition    GI bleed    Acute blood loss anemia    Hypokalemia    Hypertensive urgency       being managed by PCP and specialists who is in need for long term dialysis access.  Patient is R handed.  Has been on HD for months.  No previous access surgery.  No defibrillator or pacemaker.  No recent UTI.    No extremity pain and  swelling.     no MI  no Stroke  yes DM  yes HTN  no Lupus  no nephropathy  Tobacco use: denies     6/2023: Presents for f/u.  No new issues.  L foot wound has healed.     8/2023:  s/p LUE BC AVF 7/6/23.  Recovered well.  No HD US today.     10/2023:  began using AVF one needle x 1 successfully     10/2023:  DIFFICULTY WITH ACCESS.     2/2024:  unable to make surgery 11/2023.  Using cath for dialysis, occasional AVF.     5/2024:  high pressures at HD, using AVF 2 weeks.    10/2024:  c/o prolonged bleeding.    3/2025: did not make his fistulogram, cont to have prolonged bleeding.          Past Medical History:   Diagnosis Date    Cancer      Diabetes mellitus, type 2      Diabetic foot ulcer associated with diabetes mellitus due to underlying condition 07/16/2020    ESRD on hemodialysis      Hyperlipidemia      Hypertension      Obese              Past Surgical History:   Procedure Laterality Date    AV FISTULA PLACEMENT Left 07/06/2023     Procedure: CREATION, AV FISTULA;  Surgeon: Daniel Poon MD;  Location: [facility] OR;  Service: Vascular;  Laterality: Left;  RN PREOP 6/28/2023  LABS DAY OF SURGERY    BONE BIOPSY Left 07/17/2020     Procedure: BIOPSY, BONE;  Surgeon: Verona Whitmore DPM;  Location: [facility] OR;  Service: Podiatry;  Laterality: Left;    CERVICAL DISC SURGERY   07/11/2016    COLONOSCOPY N/A 3/25/2024     Procedure: COLONOSCOPY;  Surgeon: Roopa Pérez MD;  Location: [facility] (4TH FLR);  Service: Endoscopy;  Laterality: N/A;  Ref By: Dr. Pérez   constipation prep  Diaylsis Patient Lab has been ordered  3/20-precall complete-MS    COLONOSCOPY N/A 4/8/2024     Procedure: COLONOSCOPY;  Surgeon: Roopa Pérez MD;  Location: [facility] (2ND FLR);  Service: Endoscopy;  Laterality: N/A;  Prep instructions sent via portal/given to pt in clinic  pt had to be r/s at  request  Dialysis Irene Arguello,Sat-dw  Peg Prep-dw  4/2/24- Labs - dialysis /poor prep on 3/26 - PEG x 2 - extended prep /LVM for  precall - ERW  4/5-LVM for precall-KPvt    COLONOSCOPY N/A 4/16/2024     Procedure: COLONOSCOPY;  Surgeon: Jarett Hill MD;  Location: [facility] (2ND FLR);  Service: Endoscopy;  Laterality: N/A;    DEBRIDEMENT Left 07/17/2020     Procedure: DEBRIDEMENT;  Surgeon: Verona Whitmore DPM;  Location: [facility] OR;  Service: Podiatry;  Laterality: Left;    DEBRIDEMENT OF FOOT Right       toes & plantar surface    ENDOBRONCHIAL ULTRASOUND N/A 3/1/2024     Procedure: ENDOBRONCHIAL ULTRASOUND (EBUS);  Surgeon: Francisco Fleming MD;  Location: [facility] OR 2ND FLR;  Service: Pulmonary;  Laterality: N/A;    ESOPHAGEAL MANOMETRY WITH MEASUREMENT OF IMPEDANCE N/A 03/27/2023     Procedure: MANOMETRY, ESOPHAGUS, WITH IMPEDANCE MEASUREMENT;  Surgeon: Roopa Pérez MD;  Location: [facility] (4TH FLR);  Service: Endoscopy;  Laterality: N/A;  Belching and rumination protocol please  instructions via portal - sm    ESOPHAGOGASTRODUODENOSCOPY N/A 12/16/2022     Procedure: EGD (ESOPHAGOGASTRODUODENOSCOPY);  Surgeon: Eren Francois MD;  Location: [facility];  Service: Endoscopy;  Laterality: N/A;  Pt. on Dialysis. K+ ordered prior to procedure.EC    ESOPHAGOGASTRODUODENOSCOPY N/A 12/5/2023     Procedure: EGD (ESOPHAGOGASTRODUODENOSCOPY);  Surgeon: Kash Johnston MD;  Location: [facility];  Service: Endoscopy;  Laterality: N/A;    FRACTURE SURGERY Right       medial ankle, metal plate present    INJECTION OF ANESTHETIC AGENT AROUND NERVE Right 2/2/2024     Procedure: BLOCK, NERVE STELLATE GANGLION *HOLD ASPIRIN 5 DAYS PRIOR*;  Surgeon: Gokul Gonzalez MD;  Location: [facility];  Service: Pain Management;  Laterality: Right;  [phone]    INSERTION OF TUNNELED CENTRAL VENOUS CATHETER (CVC) WITH SUBCUTANEOUS PORT N/A 06/11/2021     Procedure: TUNNEL CATH INSERTION WITHOUT PORT;  Surgeon: Dosc Diagnostic Provider;  Location: [facility] OR;  Service: Radiology;  Laterality: N/A;  11AM START---PHONE PREOP 6/10/21---COVID POSTIVE ON  7/2020---NO S/S    left leg orthopedic surgery Left      REVISION OF ARTERIOVENOUS FISTULA Left 3/14/2024     Procedure: REVISION, AV FISTULA;  Surgeon: Daniel Poon MD;  Location: [facility];  Service: Vascular;  Laterality: Left;  RN preop 3/6/2024----NEED ORDERS    UPPER GASTROINTESTINAL ENDOSCOPY                 Family History   Adopted: Yes   Problem Relation Name Age of Onset    Heart disease Father        Colon cancer Neg Hx        Esophageal cancer Neg Hx        Colon polyps Neg Hx        Stomach cancer Neg Hx          Social History            Socioeconomic History    Marital status: Other    Number of children: 1   Tobacco Use    Smoking status: Never    Smokeless tobacco: Never   Substance and Sexual Activity    Alcohol use: Not Currently       Comment: occ rare beer    Drug use: No    Sexual activity: Not Currently   Social History Narrative     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      Social Determinants of Health           Financial Resource Strain: Medium Risk (4/16/2024)     Overall Financial Resource Strain (CARDIA)      Difficulty of Paying Living Expenses: Somewhat hard   Food Insecurity: No Food Insecurity (4/16/2024)     Hunger Vital Sign      Worried About Running Out of Food in the Last Year: Never true      Ran Out of Food in the Last Year: Never true   Transportation Needs: No Transportation Needs (4/16/2024)     PRAPARE - Transportation      Lack of Transportation (Medical): No      Lack of Transportation (Non-Medical): No   Physical Activity: Inactive (4/16/2024)     Exercise Vital Sign      Days of Exercise per Week: 0 days      Minutes of Exercise per Session: 0 min   Stress: Stress Concern Present (4/16/2024)     [employer] - Occupational Stress Questionnaire      Feeling of Stress : To some extent   Housing Stability: Low Risk  (4/16/2024)     Housing Stability Vital Sign      Unable to Pay for Housing in the Last Year: No      Homeless in the Last Year: No         Current Medications      Current Outpatient Medications:     hydrALAZINE (APRESOLINE) 100 MG tablet, Take 1 tablet (100 mg total) by mouth 3 (three) times daily., Disp: 270 tablet, Rfl: 3    NIFEdipine (PROCARDIA XL) 90 MG (OSM) 24 hr tablet, Take 1 tablet (90 mg total) by mouth once daily., Disp: 90 tablet, Rfl: 3        REVIEW OF SYSTEMS:  General: No fevers or chills; ENT: No sore throat; Allergy and Immunology: no persistent infections; Hematological and Lymphatic: No history of bleeding or easy bruising; Endocrine: negative; Respiratory: no cough, shortness of breath, or wheezing; Cardiovascular: no chest pain or dyspnea on exertion; Gastrointestinal: no abdominal pain/back, change in bowel habits, or bloody stools; Genito-Urinary: no dysuria, trouble voiding, or hematuria; Musculoskeletal: negative; Neurological: no TIA or stroke symptoms; Psychiatric: no nervousness, anxiety or depression.     PHYSICAL EXAM:   Pulse: 86       General appearance:          Alert, well-appearing, and in no distress.  Oriented to person, place, and time                    Neurological:          Normal speech, no focal findings noted; CN II - XII grossly intact. BUE with sensation to light touch.            Musculoskeletal:          Digits/nail without cyanosis/clubbing.  Strength 5/5 BUE.                               Neck:         Supple, no significant adenopathy, no carotid bruit can be auscultated                             Chest:          Clear to auscultation, no wheezes, rales or rhonchi, symmetric air entry. No use of accessory muscles                          Cardiac:         Normal rate and regular rhythm, S1 and S2 normal                       Abdomen:         Soft, nontender, nondistended, no masses or organomegaly, no hernia                                      No rebound tenderness noted; bowel sounds normal                                      Pulsatile aortic mass is non palpable.                               "        No groin adenopathy                 Extremities:            2 + R radial pulse, 2 + L radial pulse                                      2 + R brachial pulse, 2 + L brachial pulse                                      no RUE edema, no LUE edema                                      neg Dutch's test RUE, neg Dutch's test LUE, +thrill LUE AVF, inc well healed                          Skin:   No tissue loss     LAB RESULTS:  No results found for: "CBC"        Lab Results   Component Value Date     LABPROT 12.1 02/26/2024     INR 1.1 02/26/2024            Lab Results   Component Value Date      (L) 05/06/2024     K 5.0 05/06/2024     CL 97 05/06/2024     CO2 24 05/06/2024     GLU 94 05/06/2024     BUN 43 (H) 05/06/2024     CREATININE 4.9 (H) 05/06/2024     CALCIUM 10.1 05/06/2024     ANIONGAP 11 05/06/2024     EGFRNONAA 9.0 (A) 02/23/2022            Lab Results   Component Value Date     WBC 5.95 05/06/2024     RBC 3.43 (L) 05/06/2024     HGB 10.4 (L) 05/06/2024     HCT 31.9 (L) 05/06/2024     MCV 93 05/06/2024     MCH 30.3 05/06/2024     MCHC 32.6 05/06/2024     RDW 15.9 (H) 05/06/2024      05/06/2024     MPV 10.5 05/06/2024     GRAN 4.4 05/06/2024     GRAN 73.3 (H) 05/06/2024     LYMPH 0.8 (L) 05/06/2024     LYMPH 12.8 (L) 05/06/2024     MONO 0.7 05/06/2024     MONO 11.1 05/06/2024     EOS 0.1 05/06/2024     BASO 0.03 05/06/2024     EOSINOPHIL 1.8 05/06/2024     BASOPHIL 0.5 05/06/2024     DIFFMETHOD Automated 05/06/2024      .        Lab Results   Component Value Date     HGBA1C 4.1 05/06/2024         IMAGING:  All pertinent imaging has been reviewed and interpreted independently.     Adequate vein BUE 2022     Left upper extremity dialysis ultrasound shows no hemodynamically significant stenosis.  Flow is adequate.      3/2025: Left upper extremity dialysis ultrasound shows 50-69% cephalic arch stenosis and cephalic confluence hemodynamically significant stenosis.  Flow is 981 ml/min.         "     IMP/PLAN:  59 y.o. male with   Problem List       Patient Active Problem List   Diagnosis    Type 2 diabetes mellitus with stage 5 chronic kidney disease    HTN (hypertension)    Hyperlipidemia, acquired    ED (erectile dysfunction)    History of diabetic ulcer of foot    Osteomyelitis of second toe of left foot    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    Long term (current) use of antibiotics    Acute osteomyelitis of metatarsal bone of left foot    Diabetic ulcer of toe of left foot    Hyponatremia    Osteomyelitis of left foot    Hypoalbuminemia    Healed ulcer of left foot on examination    Iron deficiency anemia    Metabolic acidosis    Anemia due to chronic kidney disease, on chronic dialysis    Diabetic ulcer of left midfoot associated with type 2 diabetes mellitus, with fat layer exposed    Type II diabetes mellitus with neurological manifestations    Foot ulcer, right, with fat layer exposed    Foot ulceration, left, with fat layer exposed    ESRD on hemodialysis    Malignant renovascular hypertension    Calcified granuloma of lung    Abnormal findings on diagnostic imaging of lung    Tracheobronchomalacia    Intractable hiccups    Serum total bilirubin elevated    Lower extremity weakness    Abnormal MRI, lumbar spine    Goals of care, counseling/discussion    DJD (degenerative joint disease)    Complete lesion of L1 level of lumbar spinal cord    Debility    Diffuse lymphadenopathy    Lytic bone lesions on xray    Hepatic lesion    Splenic lesion    Moderate protein-calorie malnutrition    GI bleed    Acute blood loss anemia    Hypokalemia    Hypertensive urgency       being managed by PCP and specialists who is here today for evaluation of long term HD access.     -s/p LUE AVF superficialization 3/15/24 and AVF use with high pressures, did not make his fistulogram, continues with oozing - rec LUE fistulogram, radial  access      A/P :  60 M w ESRD , with AVF malfunctioning.  Plan for fistulagram with Dr. Poon 4/3

## 2025-03-31 DIAGNOSIS — N18.6 ESRD (END STAGE RENAL DISEASE): Primary | ICD-10-CM

## 2025-04-01 ENCOUNTER — TELEPHONE (OUTPATIENT)
Dept: VASCULAR SURGERY | Facility: CLINIC | Age: 60
End: 2025-04-01

## 2025-04-01 ENCOUNTER — HOSPITAL ENCOUNTER (OUTPATIENT)
Dept: PREADMISSION TESTING | Facility: HOSPITAL | Age: 60
Discharge: HOME | End: 2025-04-01
Attending: SURGERY

## 2025-04-01 ENCOUNTER — TELEPHONE (OUTPATIENT)
Dept: PREADMISSION TESTING | Facility: HOSPITAL | Age: 60
End: 2025-04-01

## 2025-04-01 VITALS — BODY MASS INDEX: 23.46 KG/M2 | WEIGHT: 177 LBS | HEIGHT: 73 IN

## 2025-04-01 NOTE — TELEPHONE ENCOUNTER
----- Message from Anushka Kaye PA-C sent at 3/31/2025 10:23 PM CDT -----  Hi, yes. Sorry I thought I added you to that secure chat. Procedure for 1 PM, arrival time 11 AMRadhikaaquilinoAnushka  ----- Message -----  From: Vidya Mancera LPN  Sent: 3/31/2025   4:30 PM CDT  To: Anushka Kaye PA-C    His procedure is rescheduled for this Thursday. Did you talk to him? Please advise further prior to calling him. Thanks!  ----- Message -----  From: Saida Starr MA  Sent: 3/31/2025   4:09 PM CDT  To: Ayah Sanchez Staff    Who called:Pt What is the request in detail:Please call to clarify his arrival time is for Thursday Can the clinic reply by [facility]? NoWould the patient rather a call back or a response via [facility]? Call backCallback Best call back number:Telephone Information:Mobile          [phone] Additional Information:Thank you.

## 2025-04-01 NOTE — TELEPHONE ENCOUNTER
Spoke with Anushka and states she spoke with pt.regarding fistulagram procedure on Thursday. Called dialysis center and spoke with Chayito. Aware pt. procedure is for Thursday 4/3/2025 and not today. Arrival time is 11 am on Thursday with 1 pm procedure time. Chayito states she is calling pt. to get him his dialysis treatment today since he is not having procedure today. Pt. is for phone pre-op today and will make sure pt.keeps phone close by. MILAN Lopez aware.

## 2025-04-01 NOTE — PRE-PROCEDURE INSTRUCTIONS
Pre-operative instructions, medication directives and pain scales reviewed with patient. All questions the patient had were answered. Re-assurance about surgical procedure and day of surgery routine given as needed, patient verbalized understanding of the pre-op instructions.  Patient instructed to report to [facility] for surgery.    Phone preop done.  Arrival time 11AM

## 2025-04-01 NOTE — TELEPHONE ENCOUNTER
Pt.procedure time for Thursday changed to arrival time 9 am with procedure time 11 am. Called and spoke with Chayito clinic administrator at [facility]. States pt.is getting his dialysis treatment at present time and plan is for him to have dialysis on Wednesday and Friday so he can have procedure on Thursday. I told her to let pt.know I will be calling him shortly and to answer his phone. Called pt. Aware of arrival time and procedure time changed. He wanted to know if he could have something to drink or eat prior. I told him nothing to eat or drink after midnight the night prior. Aware someone from the dept.will call afternoon prior to confirm times. Pt.verbalized understanding. Provider aware of plan.

## 2025-04-03 ENCOUNTER — HOSPITAL ENCOUNTER (OUTPATIENT)
Dept: INTERVENTIONAL RADIOLOGY/VASCULAR | Facility: HOSPITAL | Age: 60
Discharge: HOME | End: 2025-04-03

## 2025-04-03 ENCOUNTER — HOSPITAL ENCOUNTER (EMERGENCY)
Facility: HOSPITAL | Age: 60
Discharge: HOME | End: 2025-04-03
Attending: EMERGENCY MEDICINE

## 2025-04-03 VITALS
DIASTOLIC BLOOD PRESSURE: 95 MMHG | RESPIRATION RATE: 18 BRPM | HEART RATE: 81 BPM | SYSTOLIC BLOOD PRESSURE: 212 MMHG | OXYGEN SATURATION: 100 % | TEMPERATURE: 98 F

## 2025-04-03 VITALS
SYSTOLIC BLOOD PRESSURE: 148 MMHG | HEART RATE: 102 BPM | TEMPERATURE: 98 F | RESPIRATION RATE: 18 BRPM | DIASTOLIC BLOOD PRESSURE: 77 MMHG | OXYGEN SATURATION: 100 %

## 2025-04-03 DIAGNOSIS — N18.6 ESRD (END STAGE RENAL DISEASE): Primary | ICD-10-CM

## 2025-04-03 DIAGNOSIS — N18.6 ESRD (END STAGE RENAL DISEASE): ICD-10-CM

## 2025-04-03 DIAGNOSIS — I10 UNCONTROLLED HYPERTENSION: ICD-10-CM

## 2025-04-03 LAB
ABSOLUTE EOSINOPHIL (OHS): 0.12 K/UL
ABSOLUTE MONOCYTE (OHS): 0.7 K/UL (ref 0.3–1)
ABSOLUTE NEUTROPHIL COUNT (OHS): 3.55 K/UL (ref 1.8–7.7)
ALBUMIN SERPL BCP-MCNC: 2.9 G/DL (ref 3.5–5.2)
ALP SERPL-CCNC: 717 UNIT/L (ref 40–150)
ALT SERPL W/O P-5'-P-CCNC: 46 UNIT/L (ref 10–44)
ANION GAP (OHS): 11 MMOL/L (ref 8–16)
AST SERPL-CCNC: 55 UNIT/L (ref 11–45)
BASOPHILS # BLD AUTO: 0.04 K/UL
BASOPHILS NFR BLD AUTO: 0.8 %
BILIRUB SERPL-MCNC: 1.2 MG/DL (ref 0.1–1)
BUN SERPL-MCNC: 28 MG/DL (ref 6–20)
CALCIUM SERPL-MCNC: 10.1 MG/DL (ref 8.7–10.5)
CHLORIDE SERPL-SCNC: 97 MMOL/L (ref 95–110)
CO2 SERPL-SCNC: 25 MMOL/L (ref 23–29)
CREAT SERPL-MCNC: 3.2 MG/DL (ref 0.5–1.4)
ERYTHROCYTE [DISTWIDTH] IN BLOOD BY AUTOMATED COUNT: 16.5 % (ref 11.5–14.5)
GFR SERPLBLD CREATININE-BSD FMLA CKD-EPI: 21 ML/MIN/1.73/M2
GLUCOSE SERPL-MCNC: 86 MG/DL (ref 70–110)
HCT VFR BLD AUTO: 35.6 % (ref 40–54)
HGB BLD-MCNC: 11.2 GM/DL (ref 14–18)
IMM GRANULOCYTES # BLD AUTO: 0.04 K/UL (ref 0–0.04)
IMM GRANULOCYTES NFR BLD AUTO: 0.8 % (ref 0–0.5)
LYMPHOCYTES # BLD AUTO: 0.6 K/UL (ref 1–4.8)
MCH RBC QN AUTO: 28.9 PG (ref 27–31)
MCHC RBC AUTO-ENTMCNC: 31.5 G/DL (ref 32–36)
MCV RBC AUTO: 92 FL (ref 82–98)
NUCLEATED RBC (/100WBC) (OHS): 0 /100 WBC
PLATELET # BLD AUTO: 147 K/UL (ref 150–450)
PMV BLD AUTO: 10 FL (ref 9.2–12.9)
POCT GLUCOSE: 111 MG/DL (ref 70–110)
POTASSIUM SERPL-SCNC: 4.5 MMOL/L (ref 3.5–5.1)
POTASSIUM SERPL-SCNC: 4.9 MMOL/L (ref 3.5–5.1)
PROT SERPL-MCNC: 8 GM/DL (ref 6–8.4)
RBC # BLD AUTO: 3.88 M/UL (ref 4.6–6.2)
RELATIVE EOSINOPHIL (OHS): 2.4 %
RELATIVE LYMPHOCYTE (OHS): 11.9 % (ref 18–48)
RELATIVE MONOCYTE (OHS): 13.9 % (ref 4–15)
RELATIVE NEUTROPHIL (OHS): 70.2 % (ref 38–73)
SODIUM SERPL-SCNC: 133 MMOL/L (ref 136–145)
WBC # BLD AUTO: 5.05 K/UL (ref 3.9–12.7)

## 2025-04-03 PROCEDURE — 25000003 PHARM REV CODE 250: Performed by: SURGERY

## 2025-04-03 PROCEDURE — 63600175 PHARM REV CODE 636 W HCPCS: Performed by: SURGERY

## 2025-04-03 PROCEDURE — 25000003 PHARM REV CODE 250: Performed by: EMERGENCY MEDICINE

## 2025-04-03 PROCEDURE — 84132 ASSAY OF SERUM POTASSIUM: CPT | Performed by: EMERGENCY MEDICINE

## 2025-04-03 PROCEDURE — 96366 THER/PROPH/DIAG IV INF ADDON: CPT

## 2025-04-03 PROCEDURE — 96365 THER/PROPH/DIAG IV INF INIT: CPT

## 2025-04-03 PROCEDURE — 99285 EMERGENCY DEPT VISIT HI MDM: CPT | Mod: 25

## 2025-04-03 PROCEDURE — 96373 THER/PROPH/DIAG INJ IA: CPT

## 2025-04-03 PROCEDURE — 84132 ASSAY OF SERUM POTASSIUM: CPT

## 2025-04-03 PROCEDURE — 36415 COLL VENOUS BLD VENIPUNCTURE: CPT

## 2025-04-03 PROCEDURE — 25500020 PHARM REV CODE 255: Performed by: EMERGENCY MEDICINE

## 2025-04-03 PROCEDURE — 63600175 PHARM REV CODE 636 W HCPCS: Performed by: EMERGENCY MEDICINE

## 2025-04-03 PROCEDURE — 85025 COMPLETE CBC W/AUTO DIFF WBC: CPT | Performed by: EMERGENCY MEDICINE

## 2025-04-03 RX ORDER — LIDOCAINE HYDROCHLORIDE 10 MG/ML
INJECTION, SOLUTION INFILTRATION; PERINEURAL
Status: COMPLETED | OUTPATIENT
Start: 2025-04-03 | End: 2025-04-03

## 2025-04-03 RX ORDER — MIDAZOLAM HYDROCHLORIDE 2 MG/2ML
INJECTION, SOLUTION INTRAMUSCULAR; INTRAVENOUS
Status: COMPLETED | OUTPATIENT
Start: 2025-04-03 | End: 2025-04-03

## 2025-04-03 RX ORDER — HYDRALAZINE HYDROCHLORIDE 20 MG/ML
10 INJECTION INTRAMUSCULAR; INTRAVENOUS ONCE
Status: COMPLETED | OUTPATIENT
Start: 2025-04-03 | End: 2025-04-03

## 2025-04-03 RX ORDER — NIFEDIPINE 30 MG/1
90 TABLET, EXTENDED RELEASE ORAL ONCE
Status: COMPLETED | OUTPATIENT
Start: 2025-04-03 | End: 2025-04-03

## 2025-04-03 RX ORDER — CEFAZOLIN 2 G/1
2 INJECTION, POWDER, FOR SOLUTION INTRAMUSCULAR; INTRAVENOUS ONCE
Status: DISCONTINUED | OUTPATIENT
Start: 2025-04-03 | End: 2025-04-04 | Stop reason: HOSPADM

## 2025-04-03 RX ORDER — CEFAZOLIN SODIUM 1 G/50ML
SOLUTION INTRAVENOUS
Status: COMPLETED | OUTPATIENT
Start: 2025-04-03 | End: 2025-04-03

## 2025-04-03 RX ORDER — HYDROCODONE BITARTRATE AND ACETAMINOPHEN 5; 325 MG/1; MG/1
1 TABLET ORAL
Refills: 0 | Status: COMPLETED | OUTPATIENT
Start: 2025-04-03 | End: 2025-04-03

## 2025-04-03 RX ORDER — NICARDIPINE HYDROCHLORIDE 0.2 MG/ML
2.5 INJECTION INTRAVENOUS CONTINUOUS
Status: DISCONTINUED | OUTPATIENT
Start: 2025-04-03 | End: 2025-04-03 | Stop reason: HOSPADM

## 2025-04-03 RX ORDER — NITROGLYCERIN 20 MG/100ML
INJECTION INTRAVENOUS
Status: COMPLETED | OUTPATIENT
Start: 2025-04-03 | End: 2025-04-03

## 2025-04-03 RX ORDER — CHLORPROMAZINE HYDROCHLORIDE 25 MG/1
25 TABLET, FILM COATED ORAL ONCE
Status: COMPLETED | OUTPATIENT
Start: 2025-04-03 | End: 2025-04-03

## 2025-04-03 RX ORDER — HYDRALAZINE HYDROCHLORIDE 20 MG/ML
10 INJECTION INTRAMUSCULAR; INTRAVENOUS EVERY 6 HOURS PRN
Status: DISCONTINUED | OUTPATIENT
Start: 2025-04-03 | End: 2025-04-04 | Stop reason: HOSPADM

## 2025-04-03 RX ORDER — NIFEDIPINE 30 MG/1
30 TABLET, EXTENDED RELEASE ORAL ONCE
Status: COMPLETED | OUTPATIENT
Start: 2025-04-03 | End: 2025-04-03

## 2025-04-03 RX ORDER — HEPARIN SODIUM 1000 [USP'U]/ML
INJECTION, SOLUTION INTRAVENOUS; SUBCUTANEOUS
Status: COMPLETED | OUTPATIENT
Start: 2025-04-03 | End: 2025-04-03

## 2025-04-03 RX ORDER — FENTANYL CITRATE 50 UG/ML
INJECTION, SOLUTION INTRAMUSCULAR; INTRAVENOUS
Status: COMPLETED | OUTPATIENT
Start: 2025-04-03 | End: 2025-04-03

## 2025-04-03 RX ORDER — VERAPAMIL HYDROCHLORIDE 2.5 MG/ML
INJECTION, SOLUTION INTRAVENOUS
Status: COMPLETED | OUTPATIENT
Start: 2025-04-03 | End: 2025-04-03

## 2025-04-03 RX ORDER — HYDRALAZINE HYDROCHLORIDE 25 MG/1
50 TABLET, FILM COATED ORAL
Status: COMPLETED | OUTPATIENT
Start: 2025-04-03 | End: 2025-04-03

## 2025-04-03 RX ORDER — HYDRALAZINE HYDROCHLORIDE 25 MG/1
100 TABLET, FILM COATED ORAL
Status: COMPLETED | OUTPATIENT
Start: 2025-04-03 | End: 2025-04-03

## 2025-04-03 RX ADMIN — LIDOCAINE HYDROCHLORIDE 2 ML: 10 INJECTION, SOLUTION INFILTRATION; PERINEURAL at 03:04

## 2025-04-03 RX ADMIN — IOHEXOL 32 ML: 350 INJECTION, SOLUTION INTRAVENOUS at 04:04

## 2025-04-03 RX ADMIN — VERAPAMIL HYDROCHLORIDE 2.5 MG: 2.5 INJECTION, SOLUTION INTRAVENOUS at 03:04

## 2025-04-03 RX ADMIN — NITROGLYCERIN 200 MCG: 20 INJECTION INTRAVENOUS at 03:04

## 2025-04-03 RX ADMIN — HYDRALAZINE HYDROCHLORIDE 100 MG: 25 TABLET ORAL at 07:04

## 2025-04-03 RX ADMIN — MIDAZOLAM HYDROCHLORIDE 1 MG: 1 INJECTION, SOLUTION INTRAMUSCULAR; INTRAVENOUS at 03:04

## 2025-04-03 RX ADMIN — CHLORPROMAZINE HYDROCHLORIDE 25 MG: 25 TABLET, FILM COATED ORAL at 01:04

## 2025-04-03 RX ADMIN — HYDRALAZINE HYDROCHLORIDE 10 MG: 20 INJECTION INTRAMUSCULAR; INTRAVENOUS at 08:04

## 2025-04-03 RX ADMIN — FENTANYL CITRATE 25 MCG: 50 INJECTION, SOLUTION INTRAMUSCULAR; INTRAVENOUS at 03:04

## 2025-04-03 RX ADMIN — HYDRALAZINE HYDROCHLORIDE 50 MG: 25 TABLET ORAL at 01:04

## 2025-04-03 RX ADMIN — FENTANYL CITRATE 50 MCG: 50 INJECTION, SOLUTION INTRAMUSCULAR; INTRAVENOUS at 03:04

## 2025-04-03 RX ADMIN — CEFAZOLIN SODIUM 2 G: 1 SOLUTION INTRAVENOUS at 03:04

## 2025-04-03 RX ADMIN — NIFEDIPINE 90 MG: 30 TABLET, FILM COATED, EXTENDED RELEASE ORAL at 01:04

## 2025-04-03 RX ADMIN — LIDOCAINE HYDROCHLORIDE 4 ML: 10 INJECTION, SOLUTION INFILTRATION; PERINEURAL at 03:04

## 2025-04-03 RX ADMIN — CHLORPROMAZINE HYDROCHLORIDE 25 MG: 25 TABLET, FILM COATED ORAL at 07:04

## 2025-04-03 RX ADMIN — NIFEDIPINE 30 MG: 30 TABLET, FILM COATED, EXTENDED RELEASE ORAL at 07:04

## 2025-04-03 RX ADMIN — HEPARIN SODIUM 3000 UNITS: 1000 INJECTION, SOLUTION INTRAVENOUS; SUBCUTANEOUS at 03:04

## 2025-04-03 RX ADMIN — NICARDIPINE HYDROCHLORIDE 2.5 MG/HR: 0.2 INJECTION, SOLUTION INTRAVENOUS at 01:04

## 2025-04-03 RX ADMIN — HYDRALAZINE HYDROCHLORIDE 10 MG: 20 INJECTION INTRAMUSCULAR; INTRAVENOUS at 09:04

## 2025-04-03 RX ADMIN — NICARDIPINE HYDROCHLORIDE 2.5 MG/HR: 0.2 INJECTION, SOLUTION INTRAVENOUS at 05:04

## 2025-04-03 RX ADMIN — HYDRALAZINE HYDROCHLORIDE 10 MG: 20 INJECTION INTRAMUSCULAR; INTRAVENOUS at 11:04

## 2025-04-03 RX ADMIN — HYDROCODONE BITARTRATE AND ACETAMINOPHEN 1 TABLET: 5; 325 TABLET ORAL at 07:04

## 2025-04-03 NOTE — ED PROVIDER NOTES
Encounter Date: 4/3/2025       History     Chief Complaint   Patient presents with    Hypertension     Pt presents to ED from vascular surgery with high blood pressure readings.     HPI    59-year-old male with past medical history diabetes, diabetic foot off sore, ESRD on HD M/W/F, hypertension, hyperlipidemia presents with concern for malignant hypertension.  Patient reports he was upstairs with vascular surgery undergoing a fistulogram as his fistula to the left upper extremity has had some decreased flow recently.  He was able to get dialysis yesterday.  Reports he may have fallen asleep last night when he was supposed to take his Procardia.  He took his hydralazine and 6:00 a.m. this morning, usually takes again at 2:00 p.m. and again later on at night.  He has no headache, does report not eating or drinking anything but some ensures and states he has had some ongoing hiccups.  He makes very little urine daily.  He does take Thorazine for his hiccups which usually works.  He is not taking any today.  He has no fevers or chills or visual complaints, denies any further complaints.  Patient received 4 doses of hydralazine while in the IR suite with vascular surgery without improvement in his blood pressure.    Review of patient's allergies indicates:  No Known Allergies  Past Medical History:   Diagnosis Date    Cancer     Diabetes mellitus, type 2     Diabetic foot ulcer associated with diabetes mellitus due to underlying condition 07/16/2020    ESRD on hemodialysis     Hyperlipidemia     Hypertension     Obese      Past Surgical History:   Procedure Laterality Date    AV FISTULA PLACEMENT Left 07/06/2023    Procedure: CREATION, AV FISTULA;  Surgeon: Daniel Poon MD;  Location: [facility] OR;  Service: Vascular;  Laterality: Left;  RN PREOP 6/28/2023  LABS DAY OF SURGERY    BONE BIOPSY Left 07/17/2020    Procedure: BIOPSY, BONE;  Surgeon: Verona Whitmore DPM;  Location: [facility] OR;  Service: Podiatry;   Laterality: Left;    CERVICAL DISC SURGERY  07/11/2016    COLONOSCOPY N/A 3/25/2024    Procedure: COLONOSCOPY;  Surgeon: Roopa Pérez MD;  Location: [facility] (4TH FLR);  Service: Endoscopy;  Laterality: N/A;  Ref By: Dr. Pérez   constipation prep  Diaylsis Patient Lab has been ordered  3/20-precall complete-MS    COLONOSCOPY N/A 4/8/2024    Procedure: COLONOSCOPY;  Surgeon: Roopa Pérez MD;  Location: [facility] (2ND FLR);  Service: Endoscopy;  Laterality: N/A;  Prep instructions sent via portal/given to pt in clinic  pt had to be r/s at  request  Dialysis Tues,Thurs,Sat-dw  Peg Prep-dw  4/2/24- Labs - dialysis /poor prep on 3/26 - PEG x 2 - extended prep /LVM for precall - ERW  4/5-LVM for precall-KPvt    COLONOSCOPY N/A 4/16/2024    Procedure: COLONOSCOPY;  Surgeon: Jarett Hill MD;  Location: [facility] (2ND FLR);  Service: Endoscopy;  Laterality: N/A;    DEBRIDEMENT Left 07/17/2020    Procedure: DEBRIDEMENT;  Surgeon: Verona Whitmore DPM;  Location: [facility] OR;  Service: Podiatry;  Laterality: Left;    DEBRIDEMENT OF FOOT Right     toes & plantar surface    ENDOBRONCHIAL ULTRASOUND N/A 3/1/2024    Procedure: ENDOBRONCHIAL ULTRASOUND (EBUS);  Surgeon: Francisco Fleming MD;  Location: [facility] 2ND FLR;  Service: Pulmonary;  Laterality: N/A;    ENDOSCOPIC MUCOSAL RESECTION OF COLON N/A 9/9/2024    Procedure: RESECTION, MUCOSA, COLON, ENDOSCOPIC;  Surgeon: Vikas Quinonez MD;  Location: [facility] (2ND FLR);  Service: Endoscopy;  Laterality: N/A;  6/11 portal-peg-dialysis T Th Sat-+K scheduled-.colonoscopy in 3 months with [facility] doc, [facility], removal of TI polyp and assess prior EMR sites. Cristiano-tt  7/19/24: rescheduled instructions sent via portal-GD  9/3-lvm for pre call-tb  9/4-pre call complete-    ESOPHAGEAL MANOMETRY WITH MEASUREMENT OF IMPEDANCE N/A 03/27/2023    Procedure: MANOMETRY, ESOPHAGUS, WITH IMPEDANCE MEASUREMENT;  Surgeon: Roopa Pérez MD;  Location: [facility] ([address] [place] [address]);   Service: Endoscopy;  Laterality: N/A;  Belching and rumination protocol please  instructions via portal - sm    ESOPHAGOGASTRODUODENOSCOPY N/A 12/16/2022    Procedure: EGD (ESOPHAGOGASTRODUODENOSCOPY);  Surgeon: Eren Francois MD;  Location: [facility];  Service: Endoscopy;  Laterality: N/A;  Pt. on Dialysis. K+ ordered prior to procedure.EC    ESOPHAGOGASTRODUODENOSCOPY N/A 12/5/2023    Procedure: EGD (ESOPHAGOGASTRODUODENOSCOPY);  Surgeon: Kash Johnston MD;  Location: [facility] ENDO;  Service: Endoscopy;  Laterality: N/A;    FRACTURE SURGERY Right     medial ankle, metal plate present    INJECTION OF ANESTHETIC AGENT AROUND NERVE Right 2/2/2024    Procedure: BLOCK, NERVE STELLATE GANGLION *HOLD ASPIRIN 5 DAYS PRIOR*;  Surgeon: Gokul Gonzalez MD;  Location: [facility] PAIN MGT;  Service: Pain Management;  Laterality: Right;  [phone]    INSERTION OF TUNNELED CENTRAL VENOUS CATHETER (CVC) WITH SUBCUTANEOUS PORT N/A 06/11/2021    Procedure: TUNNEL CATH INSERTION WITHOUT PORT;  Surgeon: Jose Manuel Diagnostic Provider;  Location: [facility];  Service: Radiology;  Laterality: N/A;  11AM START---PHONE PREOP 6/10/21---COVID POSTIVE ON 7/2020---NO S/S    left leg orthopedic surgery Left     MUSCLE BIOPSY Left 11/4/2024    Procedure: BIOPSY, MUSCLE;  Surgeon: Adithya Lewis MD;  Location: [facility];  Service: Neurosurgery;  Laterality: Left;  Left quadriceps muscle biopsy, will need standard pathology protocol for muscle sample. MAC anesthetic with local anesthetic injection    RN PREOP 10/28/2024    REVISION OF ARTERIOVENOUS FISTULA Left 3/14/2024    Procedure: REVISION, AV FISTULA;  Surgeon: Daniel Poon MD;  Location: [facility];  Service: Vascular;  Laterality: Left;  RN preop 3/6/2024----NEED ORDERS    UPPER GASTROINTESTINAL ENDOSCOPY       Family History   Adopted: Yes   Problem Relation Name Age of Onset    Heart disease Father      Colon cancer Neg Hx      Esophageal cancer Neg Hx      Colon polyps Neg Hx       Stomach cancer Neg Hx       Social History[1]  Review of Systems   Constitutional: Negative.    HENT: Negative.     Eyes: Negative.    Respiratory: Negative.     Cardiovascular: Negative.    Gastrointestinal: Negative.    Genitourinary: Negative.    Musculoskeletal: Negative.    Skin: Negative.    Neurological: Negative.        Physical Exam     Initial Vitals [04/03/25 1257]   BP Pulse Resp Temp SpO2   (!) 230/113 81 (!) 22 98.3 °F (36.8 °C) 100 %      MAP       --         Physical Exam    Nursing note and vitals reviewed.  Constitutional: He appears well-developed. He is not diaphoretic. No distress.   Active with hiccups   HENT:   Head: Normocephalic and atraumatic.   Nose: Nose normal.   Eyes: EOM are normal. Pupils are equal, round, and reactive to light.   Neck: Neck supple. No tracheal deviation present. No JVD present.   Normal range of motion.  Cardiovascular:  Normal rate, regular rhythm, normal heart sounds and intact distal pulses.           Pulmonary/Chest: Breath sounds normal. No respiratory distress. He has no wheezes. He has no rhonchi. He has no rales.   Abdominal: Abdomen is soft. Bowel sounds are normal. He exhibits no distension. There is no abdominal tenderness. There is no rebound and no guarding.   Musculoskeletal:         General: No tenderness or edema. Normal range of motion.      Cervical back: Normal range of motion and neck supple.      Comments: Left upper extremity AV fistula with palpable thrill     Neurological: He is alert and oriented to person, place, and time. He has normal strength.   Skin: Skin is warm and dry. Capillary refill takes less than 2 seconds. No rash noted. No erythema.         ED Course   Critical Care    Date/Time: 4/3/2025 12:45 PM    Performed by: Wagner Almeida MD  Authorized by: Wagner Almeida MD  Direct patient critical care time: 15 minutes  Additional history critical care time: 10 minutes  Ordering / reviewing critical care time: 5  minutes  Documentation critical care time: 5 minutes  Total critical care time (exclusive of procedural time) : 35 minutes  Critical care time was exclusive of separately billable procedures and treating other patients and teaching time.  Critical care was necessary to treat or prevent imminent or life-threatening deterioration of the following conditions: shock, circulatory failure and CNS failure or compromise.  Critical care was time spent personally by me on the following activities: development of treatment plan with patient or surrogate, evaluation of patient's response to treatment, examination of patient, obtaining history from patient or surrogate, ordering and performing treatments and interventions, ordering and review of laboratory studies, re-evaluation of patient's condition, review of old charts and pulse oximetry.        Labs Reviewed   COMPREHENSIVE METABOLIC PANEL - Abnormal       Result Value    Sodium 133 (*)     Potassium 4.9      Chloride 97      CO2 25      Glucose 86      BUN 28 (*)     Creatinine 3.2 (*)     Calcium 10.1      Protein Total 8.0      Albumin 2.9 (*)     Bilirubin Total 1.2 (*)      (*)     AST 55 (*)     ALT 46 (*)     Anion Gap 11      eGFR 21 (*)     Narrative:     Specimen slightly hemolyzed.   CBC WITH DIFFERENTIAL - Abnormal    WBC 5.05      RBC 3.88 (*)     HGB 11.2 (*)     HCT 35.6 (*)     MCV 92      MCH 28.9      MCHC 31.5 (*)     RDW 16.5 (*)     Platelet Count 147 (*)     MPV 10.0      Nucleated RBC 0      Neut % 70.2      Lymph % 11.9 (*)     Mono % 13.9      Eos % 2.4      Basophil % 0.8      Imm Grans % 0.8 (*)     Neut # 3.55      Lymph # 0.60 (*)     Mono # 0.70      Eos # 0.12      Baso # 0.04      Imm Grans # 0.04     CBC W/ AUTO DIFFERENTIAL    Narrative:     The following orders were created for panel order CBC auto differential.  Procedure                               Abnormality         Status                     ---------                                -----------         ------                     CBC with Differential[[id number]]       Abnormal            Final result                 Please view results for these tests on the individual orders.          Imaging Results              IR Fistulagram w/ Pat with Imaging (Final result)  Result time 04/03/25 16:08:06      Final result by Access, Silent  (04/03/25 16:08:06)                   Narrative:    See OP Notes for results.     IMPRESSION: See OP Notes for results.             This procedure was auto-finalized by: Virtual Radiologist                                     Medications   niCARdipine 40 mg/200 mL (0.2 mg/mL) infusion (0 mg/hr Intravenous Stopped 4/3/25 1826)   chlorproMAZINE tablet 25 mg (25 mg Oral Given 4/3/25 1303)   NIFEdipine 24 hr tablet 90 mg (90 mg Oral Given 4/3/25 1303)   hydrALAZINE tablet 50 mg (50 mg Oral Given 4/3/25 1303)   LIDOcaine HCL 10 mg/ml (1%) injection (4 mLs Other Given 4/3/25 1529)   midazolam (PF) (VERSED) 1 mg/mL injection (1 mg Intravenous Given 4/3/25 1519)   fentaNYL 50 mcg/mL injection (25 mcg Intravenous Given 4/3/25 1544)   heparin (porcine) injection (3,000 Units Intravenous Given 4/3/25 1529)   ceFAZolin (ANCEF) 1 gram in dextrose 5 % 50 mL IVPB (premix) (2 g Intravenous New Bag 4/3/25 1509)   verapamiL injection (2.5 mg Intra-arterial Given 4/3/25 1530)   nitroGLYCERIN in dextrose 5% 200 mcg/mL IVP (200 mcg Intra-arterial Given 4/3/25 1530)   iohexoL (OMNIPAQUE 350) injection 100 mL (32 mLs Intravenous Given 4/3/25 1602)   NIFEdipine 24 hr tablet 30 mg (30 mg Oral Given 4/3/25 1934)   hydrALAZINE tablet 100 mg (100 mg Oral Given 4/3/25 1934)   HYDROcodone-acetaminophen 5-325 mg per tablet 1 tablet (1 tablet Oral Given 4/3/25 1934)   chlorproMAZINE tablet 25 mg (25 mg Oral Given 4/3/25 1935)     Medical Decision Making  Amount and/or Complexity of Data Reviewed  Labs: ordered.    Risk  Prescription drug management.      MDM:    59-year-old male with  past medical history diabetes, diabetic foot off sore, ESRD on HD M/W/F, hypertension, hyperlipidemia presents with concern for malignant hypertension.  Differential Diagnosis includes:  Hypertensive emergency, hypertensive urgency, arrhythmia, electrolyte abnormality, intracranial hemorrhage.  Physical exam as noted above.  ED workup notable for CBC white blood cell count 5.05, hemoglobin 11.2, CMP with BUN 28, creatinine 3.2, AST/ALT 55/46.  Patient presentation appears more consistent with malignant hypertension, likely secondary to missed home doses.  Given his home medication, Cardene transitory, and patient was able to undergo fistulogram.  Patient was sent to the PACU and subsequently back to the emergency department, Cardene was transitioned off with blood pressure holding.  He will have dialysis tomorrow.  Given his nighttime medications and discharged home. Return precautions given and all questions answered.  Patient in understanding of plan.  Pt discharged to home improved and stable.        Note was created using voice recognition software. Note may have occasional typographical or grammatical errors, garbled syntax, and other bizarre constructions that may not have been identified and edited despite good bridgette initial review prior to signing.                                         Clinical Impression:  Final diagnoses:  [I10] Uncontrolled hypertension          ED Disposition Condition    Discharge Stable          ED Prescriptions    None       Follow-up Information       Follow up With Specialties Details Why Contact Info    [facility] [place] - Emergency Dept Emergency Medicine Go to  If symptoms worsen [address] [facility] [place] [id number] [phone]    Chauncey Perez MD Internal Medicine Go in 1 week As needed [address] [facility] [address]  [phone]                   [1]   Social History  Tobacco Use    Smoking status: Never     Smokeless tobacco: Never   Substance Use Topics    Alcohol use: Not Currently     Comment: occ rare beer    Drug use: No        Wagner Almeida MD  04/03/25 [id number]

## 2025-04-03 NOTE — DISCHARGE SUMMARY
Patient's procedure not able to be performed at this time due to uncontrolled HTN; pt sent to ER for mgmt.  Will schedule as soon as possible.

## 2025-04-03 NOTE — Clinical Note
Left: Arm and Wrist.   Scrubbed with Chlorhexidine/Alcohol.    Hair: N/A.  Skin prep dry before draping.  Prepped by: Ej Calles, RT 4/3/2025 3:01 PM.

## 2025-04-03 NOTE — H&P
Show:  []Written[]Templated[x]Copied    Added by:  [x]Timothy Martinez MD    []Curtis for details     HPI:  Catalino Mcfadden is a 59 y.o. male with   Problem List         Patient Active Problem List   Diagnosis    Type 2 diabetes mellitus with stage 5 chronic kidney disease    HTN (hypertension)    Hyperlipidemia, acquired    ED (erectile dysfunction)    History of diabetic ulcer of foot    Osteomyelitis of second toe of left foot    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    Long term (current) use of antibiotics    Acute osteomyelitis of metatarsal bone of left foot    Diabetic ulcer of toe of left foot    Hyponatremia    Osteomyelitis of left foot    Hypoalbuminemia    Healed ulcer of left foot on examination    Iron deficiency anemia    Metabolic acidosis    Anemia due to chronic kidney disease, on chronic dialysis    Diabetic ulcer of left midfoot associated with type 2 diabetes mellitus, with fat layer exposed    Type II diabetes mellitus with neurological manifestations    Foot ulcer, right, with fat layer exposed    Foot ulceration, left, with fat layer exposed    ESRD on hemodialysis    Malignant renovascular hypertension    Calcified granuloma of lung    Abnormal findings on diagnostic imaging of lung    Tracheobronchomalacia    Intractable hiccups    Serum total bilirubin elevated    Lower extremity weakness    Abnormal MRI, lumbar spine    Goals of care, counseling/discussion    DJD (degenerative joint disease)    Complete lesion of L1 level of lumbar spinal cord    Debility    Diffuse lymphadenopathy    Lytic bone lesions on xray    Hepatic lesion    Splenic lesion    Moderate protein-calorie malnutrition    GI bleed    Acute blood loss anemia    Hypokalemia    Hypertensive urgency       being managed by PCP and specialists who is in need for long term dialysis access.  Patient is R handed.  Has been on HD for  months.  No previous access surgery.  No defibrillator or pacemaker.  No recent UTI.    No extremity pain and swelling.     no MI  no Stroke  yes DM  yes HTN  no Lupus  no nephropathy  Tobacco use: denies     6/2023: Presents for f/u.  No new issues.  L foot wound has healed.     8/2023:  s/p LUE BC AVF 7/6/23.  Recovered well.  No HD US today.     10/2023:  began using AVF one needle x 1 successfully     10/2023:  DIFFICULTY WITH ACCESS.     2/2024:  unable to make surgery 11/2023.  Using cath for dialysis, occasional AVF.     5/2024:  high pressures at HD, using AVF 2 weeks.     10/2024:  c/o prolonged bleeding.     3/2025: did not make his fistulogram, cont to have prolonged bleeding.             Past Medical History:   Diagnosis Date    Cancer      Diabetes mellitus, type 2      Diabetic foot ulcer associated with diabetes mellitus due to underlying condition 07/16/2020    ESRD on hemodialysis      Hyperlipidemia      Hypertension      Obese                  Past Surgical History:   Procedure Laterality Date    AV FISTULA PLACEMENT Left 07/06/2023     Procedure: CREATION, AV FISTULA;  Surgeon: Daniel Poon MD;  Location: [facility] OR;  Service: Vascular;  Laterality: Left;  RN PREOP 6/28/2023  LABS DAY OF SURGERY    BONE BIOPSY Left 07/17/2020     Procedure: BIOPSY, BONE;  Surgeon: Verona Whitmore DPM;  Location: [facility] OR;  Service: Podiatry;  Laterality: Left;    CERVICAL DISC SURGERY   07/11/2016    COLONOSCOPY N/A 3/25/2024     Procedure: COLONOSCOPY;  Surgeon: Roopa Pérez MD;  Location: [facility] (4TH FLR);  Service: Endoscopy;  Laterality: N/A;  Ref By: Dr. Pérez   constipation prep  Diaylsis Patient Lab has been ordered  3/20-precall complete-MS    COLONOSCOPY N/A 4/8/2024     Procedure: COLONOSCOPY;  Surgeon: Roopa Pérez MD;  Location: [facility] (2ND FLR);  Service: Endoscopy;  Laterality: N/A;  Prep instructions sent via portal/given to pt in clinic  pt had to be r/s at   request  Dialysis Tues,Thurs,Sat-dw  Peg Prep-dw  4/2/24- Labs - dialysis /poor prep on 3/26 - PEG x 2 - extended prep /LVM for precall - ERW  4/5-LVM for precall-KPvt    COLONOSCOPY N/A 4/16/2024     Procedure: COLONOSCOPY;  Surgeon: Jarett Hill MD;  Location: [facility] (2ND FLR);  Service: Endoscopy;  Laterality: N/A;    DEBRIDEMENT Left 07/17/2020     Procedure: DEBRIDEMENT;  Surgeon: Verona Whitmore DPM;  Location: [facility] OR;  Service: Podiatry;  Laterality: Left;    DEBRIDEMENT OF FOOT Right       toes & plantar surface    ENDOBRONCHIAL ULTRASOUND N/A 3/1/2024     Procedure: ENDOBRONCHIAL ULTRASOUND (EBUS);  Surgeon: Francisco Fleming MD;  Location: [facility] OR 2ND FLR;  Service: Pulmonary;  Laterality: N/A;    ESOPHAGEAL MANOMETRY WITH MEASUREMENT OF IMPEDANCE N/A 03/27/2023     Procedure: MANOMETRY, ESOPHAGUS, WITH IMPEDANCE MEASUREMENT;  Surgeon: Roopa Pérez MD;  Location: [facility] (4TH FLR);  Service: Endoscopy;  Laterality: N/A;  Belching and rumination protocol please  instructions via portal - sm    ESOPHAGOGASTRODUODENOSCOPY N/A 12/16/2022     Procedure: EGD (ESOPHAGOGASTRODUODENOSCOPY);  Surgeon: Eren Francois MD;  Location: [facility];  Service: Endoscopy;  Laterality: N/A;  Pt. on Dialysis. K+ ordered prior to procedure.EC    ESOPHAGOGASTRODUODENOSCOPY N/A 12/5/2023     Procedure: EGD (ESOPHAGOGASTRODUODENOSCOPY);  Surgeon: Kash Johnston MD;  Location: [facility];  Service: Endoscopy;  Laterality: N/A;    FRACTURE SURGERY Right       medial ankle, metal plate present    INJECTION OF ANESTHETIC AGENT AROUND NERVE Right 2/2/2024     Procedure: BLOCK, NERVE STELLATE GANGLION *HOLD ASPIRIN 5 DAYS PRIOR*;  Surgeon: Gokul Gonzalez MD;  Location: [facility];  Service: Pain Management;  Laterality: Right;  [phone]    INSERTION OF TUNNELED CENTRAL VENOUS CATHETER (CVC) WITH SUBCUTANEOUS PORT N/A 06/11/2021     Procedure: TUNNEL CATH INSERTION WITHOUT PORT;  Surgeon: Jose Manuel  Diagnostic Provider;  Location: [facility] OR;  Service: Radiology;  Laterality: N/A;  11AM START---PHONE PREOP 6/10/21---COVID POSTIVE ON 7/2020---NO S/S    left leg orthopedic surgery Left      REVISION OF ARTERIOVENOUS FISTULA Left 3/14/2024     Procedure: REVISION, AV FISTULA;  Surgeon: Daniel Poon MD;  Location: [facility] OR;  Service: Vascular;  Laterality: Left;  RN preop 3/6/2024----NEED ORDERS    UPPER GASTROINTESTINAL ENDOSCOPY                      Family History   Adopted: Yes   Problem Relation Name Age of Onset    Heart disease Father        Colon cancer Neg Hx        Esophageal cancer Neg Hx        Colon polyps Neg Hx        Stomach cancer Neg Hx          Social History                Socioeconomic History    Marital status: Other    Number of children: 1   Tobacco Use    Smoking status: Never    Smokeless tobacco: Never   Substance and Sexual Activity    Alcohol use: Not Currently       Comment: occ rare beer    Drug use: No    Sexual activity: Not Currently   Social History Narrative     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      Social Determinants of Health              Financial Resource Strain: Medium Risk (4/16/2024)     Overall Financial Resource Strain (CARDIA)      Difficulty of Paying Living Expenses: Somewhat hard   Food Insecurity: No Food Insecurity (4/16/2024)     Hunger Vital Sign      Worried About Running Out of Food in the Last Year: Never true      Ran Out of Food in the Last Year: Never true   Transportation Needs: No Transportation Needs (4/16/2024)     PRAPARE - Transportation      Lack of Transportation (Medical): No      Lack of Transportation (Non-Medical): No   Physical Activity: Inactive (4/16/2024)     Exercise Vital Sign      Days of Exercise per Week: 0 days      Minutes of Exercise per Session: 0 min   Stress: Stress Concern Present (4/16/2024)     Citizen of [place] [employer] - Occupational Stress Questionnaire      Feeling of Stress : To some extent   Housing Stability:  Low Risk  (4/16/2024)     Housing Stability Vital Sign      Unable to Pay for Housing in the Last Year: No      Homeless in the Last Year: No         Current Medications      Current Outpatient Medications:     hydrALAZINE (APRESOLINE) 100 MG tablet, Take 1 tablet (100 mg total) by mouth 3 (three) times daily., Disp: 270 tablet, Rfl: 3    NIFEdipine (PROCARDIA XL) 90 MG (OSM) 24 hr tablet, Take 1 tablet (90 mg total) by mouth once daily., Disp: 90 tablet, Rfl: 3         REVIEW OF SYSTEMS:  General: No fevers or chills; ENT: No sore throat; Allergy and Immunology: no persistent infections; Hematological and Lymphatic: No history of bleeding or easy bruising; Endocrine: negative; Respiratory: no cough, shortness of breath, or wheezing; Cardiovascular: no chest pain or dyspnea on exertion; Gastrointestinal: no abdominal pain/back, change in bowel habits, or bloody stools; Genito-Urinary: no dysuria, trouble voiding, or hematuria; Musculoskeletal: negative; Neurological: no TIA or stroke symptoms; Psychiatric: no nervousness, anxiety or depression.     PHYSICAL EXAM:   Pulse: 86       General appearance:          Alert, well-appearing, and in no distress.  Oriented to person, place, and time                    Neurological:          Normal speech, no focal findings noted; CN II - XII grossly intact. BUE with sensation to light touch.            Musculoskeletal:          Digits/nail without cyanosis/clubbing.  Strength 5/5 BUE.                               Neck:         Supple, no significant adenopathy, no carotid bruit can be auscultated                             Chest:          Clear to auscultation, no wheezes, rales or rhonchi, symmetric air entry. No use of accessory muscles                          Cardiac:         Normal rate and regular rhythm, S1 and S2 normal                       Abdomen:         Soft, nontender, nondistended, no masses or organomegaly, no hernia                                      No  "rebound tenderness noted; bowel sounds normal                                      Pulsatile aortic mass is non palpable.                                      No groin adenopathy                 Extremities:            2 + R radial pulse, 2 + L radial pulse                                      2 + R brachial pulse, 2 + L brachial pulse                                      no RUE edema, no LUE edema                                      neg Dutch's test RUE, neg Dutch's test LUE, +thrill LUE AVF, inc well healed                          Skin:   No tissue loss     LAB RESULTS:  No results found for: "CBC"            Lab Results   Component Value Date     LABPROT 12.1 02/26/2024     INR 1.1 02/26/2024                Lab Results   Component Value Date      (L) 05/06/2024     K 5.0 05/06/2024     CL 97 05/06/2024     CO2 24 05/06/2024     GLU 94 05/06/2024     BUN 43 (H) 05/06/2024     CREATININE 4.9 (H) 05/06/2024     CALCIUM 10.1 05/06/2024     ANIONGAP 11 05/06/2024     EGFRNONAA 9.0 (A) 02/23/2022                Lab Results   Component Value Date     WBC 5.95 05/06/2024     RBC 3.43 (L) 05/06/2024     HGB 10.4 (L) 05/06/2024     HCT 31.9 (L) 05/06/2024     MCV 93 05/06/2024     MCH 30.3 05/06/2024     MCHC 32.6 05/06/2024     RDW 15.9 (H) 05/06/2024      05/06/2024     MPV 10.5 05/06/2024     GRAN 4.4 05/06/2024     GRAN 73.3 (H) 05/06/2024     LYMPH 0.8 (L) 05/06/2024     LYMPH 12.8 (L) 05/06/2024     MONO 0.7 05/06/2024     MONO 11.1 05/06/2024     EOS 0.1 05/06/2024     BASO 0.03 05/06/2024     EOSINOPHIL 1.8 05/06/2024     BASOPHIL 0.5 05/06/2024     DIFFMETHOD Automated 05/06/2024      .            Lab Results   Component Value Date     HGBA1C 4.1 05/06/2024         IMAGING:  All pertinent imaging has been reviewed and interpreted independently.     Adequate vein BUE 2022     Left upper extremity dialysis ultrasound shows no hemodynamically significant stenosis.  Flow is adequate.      3/2025: Left " upper extremity dialysis ultrasound shows 50-69% cephalic arch stenosis and cephalic confluence hemodynamically significant stenosis.  Flow is 981 ml/min.              IMP/PLAN:  59 y.o. male with   Problem List         Patient Active Problem List   Diagnosis    Type 2 diabetes mellitus with stage 5 chronic kidney disease    HTN (hypertension)    Hyperlipidemia, acquired    ED (erectile dysfunction)    History of diabetic ulcer of foot    Osteomyelitis of second toe of left foot    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    Long term (current) use of antibiotics    Acute osteomyelitis of metatarsal bone of left foot    Diabetic ulcer of toe of left foot    Hyponatremia    Osteomyelitis of left foot    Hypoalbuminemia    Healed ulcer of left foot on examination    Iron deficiency anemia    Metabolic acidosis    Anemia due to chronic kidney disease, on chronic dialysis    Diabetic ulcer of left midfoot associated with type 2 diabetes mellitus, with fat layer exposed    Type II diabetes mellitus with neurological manifestations    Foot ulcer, right, with fat layer exposed    Foot ulceration, left, with fat layer exposed    ESRD on hemodialysis    Malignant renovascular hypertension    Calcified granuloma of lung    Abnormal findings on diagnostic imaging of lung    Tracheobronchomalacia    Intractable hiccups    Serum total bilirubin elevated    Lower extremity weakness    Abnormal MRI, lumbar spine    Goals of care, counseling/discussion    DJD (degenerative joint disease)    Complete lesion of L1 level of lumbar spinal cord    Debility    Diffuse lymphadenopathy    Lytic bone lesions on xray    Hepatic lesion    Splenic lesion    Moderate protein-calorie malnutrition    GI bleed    Acute blood loss anemia    Hypokalemia    Hypertensive urgency       being managed by PCP and specialists who is here today for evaluation of long term HD  access.     -s/p LUE AVF superficialization 3/15/24 and AVF use with high pressures, did not make his fistulogram, continues with oozing - rec LUE fistulogram, radial access

## 2025-04-03 NOTE — H&P
H&P completed on 4/3/25 has been reviewed, the patient has been examined and:  I concur with the findings and  changes have occurred since H&P was written.  HTN now controlled after eval/mgmt in ER.    Mallampati Scale Class 2   ASA Classification Class III     Sedation Plan: Moderate sedation      There are no hospital problems to display for this patient.

## 2025-04-05 ENCOUNTER — OFFICE VISIT (OUTPATIENT)
Dept: PODIATRY | Facility: CLINIC | Age: 60
End: 2025-04-05

## 2025-04-05 VITALS — WEIGHT: 177 LBS | HEIGHT: 73 IN | BODY MASS INDEX: 23.46 KG/M2

## 2025-04-05 DIAGNOSIS — E11.22 TYPE 2 DIABETES MELLITUS WITH STAGE 5 CHRONIC KIDNEY DISEASE: ICD-10-CM

## 2025-04-05 DIAGNOSIS — Z86.31 HISTORY OF DIABETIC ULCER OF FOOT: ICD-10-CM

## 2025-04-05 DIAGNOSIS — N18.5 TYPE 2 DIABETES MELLITUS WITH STAGE 5 CHRONIC KIDNEY DISEASE: ICD-10-CM

## 2025-04-05 DIAGNOSIS — L84 PRE-ULCERATIVE CALLUSES: Primary | ICD-10-CM

## 2025-04-05 PROCEDURE — 99999 PR PBB SHADOW E&M-EST. PATIENT-LVL III: CPT | Mod: PBBFAC,,, | Performed by: PODIATRIST

## 2025-04-05 PROCEDURE — 99213 OFFICE O/P EST LOW 20 MIN: CPT | Mod: PBBFAC,PO | Performed by: PODIATRIST

## 2025-04-05 NOTE — PROGRESS NOTES
Subjective:      Patient ID: Catalino Mcfadden is a 60 y.o. male.    Chief Complaint: No chief complaint on file.    Catalino is a 60 y.o. male who presents to the clinic for evaluation and treatment of high risk feet. Catalino has a past medical history of Cancer, Diabetes mellitus, type 2, Diabetic foot ulcer associated with diabetes mellitus due to underlying condition (07/16/2020), ESRD on hemodialysis, Hyperlipidemia, Hypertension, and Obese. Reports new onset left heel blister x several days after a sock rubbed on the left heel. Seen in ED on 11/7/22.     11/16/2022 returns to clinic for follow up of left heel wound.  Seen by my colleague last week, cultures obtained (NGTD) and patient placed on PO abx.  He has kept dressing intact.     11/23/22: F/u left heel ulceration. Presents with calamine coflex left foot.     11/30/22: F/u left heel ulceration. Patient reports going to work this week.     12/14/22: F/u left heel ulceration. Presents in calamine coflex wrap.     12/21/22: F/u left heel ulceration. Presents in calamine coflex wrap.     12/27/22: F/u left heel ulceration. Presents in calamine coflex wrap. No new pedal complaints, continues to work     01/04/23: F/u left heel ulceration. Presents in calamine coflex wrap. No new pedal complaints, continues to work.  Continues to have hiccups      01/11/23: F/u left heel ulceration. Presents in calamine coflex wrap which appears to have padding which shifted medially. No new pedal complaints, continues to work.  Continues to have hiccups    01/18/23: F/u left heel ulceration. Presents in intact calamine coflex wrap. No new pedal complaints.    2/8/23: F/u right foot ulceration. Presents in calamine coflex wrap.     02/15/23: F/u right ulceration. Presents in intact calamine coflex wrap. No new pedal complaints.    3/15/23: F/u right foot ulceration. Presents in calamine coflex wrap.     03/22/23: F/u right ulceration. Presents in intact calamine coflex wrap. No new pedal  complaints.    04/12/23: Returns to clinic for foot inspection following achieving status of healed to bilateral foot wounds.  He has been attempting to care for feet as instructed. 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 Present in NB tennis shoes.  States he became a vegan on 04/04/2023. No new pedal complaints.    06/21/2023 patient returns to clinic for evaluation and treatment of high-risk feet.  He is status post achieving the status of healed to wounds of both feet.  He has been attempting to care the feet as instructed.  He continues to work and attempts to protect the feet in supportive shoes to the best of his ability.  Patient continues to suffer from hiccups.  No new pedal complaints.    07/19/2023 patient returns to clinic for evaluation and treatment of high-risk feet.   He has been attempting to care the feet as instructed. No longer using offloading pads He continues to work and attempts to protect the feet in supportive shoes to the best of his ability.  Patient continues to suffer from hiccups.  No new pedal complaints.    08/23/2023 patient returns to clinic for evaluation treatment of high-risk feet.  He continues to care for feet as instructed.  Recently acquired a new pair of tennis shoes, Denzel 1.  Continues to work for relates he may be looking for new employment in the coming weeks.  Patient relates a new symptom which is loss of balance or feeling like he is losing his equilibrium with walking and with rising.  He denies falls.  He denies nausea, vomiting, fever, chills.  He continues to have hiccups.    11/15/2023 patient returns to clinic for evaluation treatment of high-risk feet.  He continues to care for feet as instructed.   Patient relates that he has become virtually sedentary.  He no longer is employed.  He is living with a friend in [place].  He continues to feel a loss of balance and now also feels weakness to the lower extremities.   He denies falls.  He denies nausea, vomiting, fever, chills.  He continues to have hiccups.      02/28/2024 patient returns to clinic for evaluation treatment of high-risk feet.  He continues to care for feet as instructed.  Since our last encounter, patient relates that he has now become houseless with most of his belongings being in storage and currently residing in a hotel temporarily.  Patient has plans to return to his hometown of [place] when able.  States that because many of his belongings are in storage he has been wearing the flat nonsupportive tennis shoe that he presents in today.  He states that when he had his feet checked and dialysis they noted increased callus formation to his left lateral foot which was an area previous chronic ulceration and osteomyelitis.  He denies seeing any drainage.  He denies pain secondary to neuropathy.  He has yet to find employment due to consistently failing physical exams.  He continues to feel a loss of balance and now also feels weakness to the lower extremities.  He denies falls.  He continues to have hiccups.    06/04/2024 patient returns to clinic for evaluation treatment of high-risk feet.  Patient relates that he now resides in a small room in [place].   States that because many of his belongings are in storage he continues to wear flat nonsupportive tennis shoes.  He denies pain secondary to neuropathy.  He continues to feel a loss of balance and now also feels weakness to the lower extremities.  He denies falls.  He continues to have hiccups.      06/27/2024 patient returns to clinic for evaluation treatment of high-risk feet.  He has been attempting to care for pre ulcerative lesions as instructed and relates subjective improvement. He denies pain secondary to neuropathy.  No new pedal complaints    08/01/2024 patient returns to clinic for evaluation treatment of high-risk feet.  He has been attempting to care for pre ulcerative lesions as instructed and  "relates subjective improvement. He denies pain secondary to neuropathy.  No new pedal complaints. [facility]    11/06/2024 patient returns to clinic for evaluation treatment of high-risk feet.  He has been attempting to care for pre ulcerative lesions as instructed.He denies pain secondary to neuropathy.  No new pedal complaints.      04/05/2025 patient returns to clinic for evaluation treatment of high-risk feet.  He relates that has become harder and harder for him to reach his feet in order to care for them but he has noticed more peeling skin and dryness.  He denies seeing any blood or drainage.  He denies pain secondary to neuropathy.      No chief complaint on file.          Review of Systems   Constitutional: Positive for malaise/fatigue and weight loss. Negative for chills.   Cardiovascular:  Negative for chest pain and claudication.   Respiratory:  Negative for cough.    Skin:  Positive for color change, dry skin and nail changes.   Musculoskeletal:  Positive for joint pain, muscle weakness, myalgias and stiffness.   Gastrointestinal:  Negative for nausea.   Neurological:  Positive for disturbances in coordination, loss of balance, numbness, paresthesias and weakness.   Psychiatric/Behavioral:  The patient is not nervous/anxious.            Objective:      Vitals:    04/05/25 1142   Weight: 80.3 kg (177 lb 0.5 oz)   Height: 6' 1" (1.854 m)   PainSc: 0-No pain         Physical Exam  Vitals and nursing note reviewed.   Constitutional:       General: He is not in acute distress.     Appearance: He is ill-appearing. He is not toxic-appearing or diaphoretic.      Comments: Pt. is well-developed, well-nourished, appears stated age, in no acute distress, alert and oriented x 3. No evidence of depression, anxiety, or agitation. Calm, cooperative, and communicative. Appropriate interactions and affect.   Cardiovascular:      Pulses:           Dorsalis pedis pulses are 2+ on the right side and 2+ " on the left side.        Posterior tibial pulses are 1+ on the right side and 1+ on the left side.      Comments: There is decreased digital hair. Skin is atrophic, slightly hyperpigmented  Pulmonary:      Effort: No respiratory distress.   Musculoskeletal:      Right lower leg: Edema present.      Left lower leg: Edema present.      Right ankle: Swelling present. No tenderness. No lateral malleolus, medial malleolus, AITF ligament, CF ligament or posterior TF ligament tenderness.      Right Achilles Tendon: No defects. Hilliard's test negative.      Left ankle: Swelling present. No tenderness. No lateral malleolus, medial malleolus, AITF ligament, CF ligament or posterior TF ligament tenderness.      Left Achilles Tendon: No defects. Hilliard's test negative.      Right foot: No tenderness or bony tenderness.      Left foot: No tenderness or bony tenderness.      Comments: Decreased stride, station of gait.  apropulsive toe off.  Increased angle and base of gait.    There is equinus deformity bilateral with decreased dorsiflexion at the ankle joint bilateral. No tenderness with compression of heel. Negative tinels sign. Gait analysis reveals excessive pronation through midstance and propulsion with early heel off.     Patient has hammertoes of digits 2-5 bilateral partially reducible     Decreased first MPJ range of motion both weightbearing and nonweightbearing, no crepitus observed the first MP joint, + dorsal flag sign.     Visible and palpable bunion without pain at dorsomedial 1st metatarsal head right and left.  Hallux abducted right and left partially reducible, tracks laterally without being track bound.  No ecchymosis, erythema, edema, or cardinal signs infection or signs of trauma same foot.    Fat pad atrophy to heels and met heads bilateral    Plantarflexed 1st ray RLE   Lymphadenopathy:      Comments: No lymphatic streaking    Negative lymphadenopathy bilateral popliteal fossa and tarsal tunnel.      Skin:     General: Skin is warm and dry.      Coloration: Skin is not pale.      Findings: Lesion (see wound descriptions below) present. No ecchymosis, laceration or rash.      Nails: There is no clubbing.      Comments: Toenails 1-5 bilaterally discolored/yellowed, dystrophic, brittle with subungual debris.   Superficial abrasion left dorsal lateral foot.    Focal hyperkeratotic lesion consisting entirely of hyperkeratotic tissue without open skin, drainage, pus, fluctuance, malodor, or signs of infection:  left plantar foot.       Neurological:      Sensory: Sensory deficit present.      Comments:  McLean-Dayton 5.07 monofilamant testing is diminished Kp feet. Decreased/absent vibratory sensation bilateral feet to 128Hz tuning fork.     Paresthesias, and hyperesthesia bilateral feet with no clearly identified trigger or source.           Psychiatric:         Attention and Perception: He is attentive.         Mood and Affect: Mood is depressed. Mood is not anxious. Affect is not inappropriate.         Speech: He is communicative. Speech is not slurred.         Behavior: Behavior is not combative.       04/05/2025    Right medial 1st metatarsophalangeal joint        Left medial 1st metatarsophalangeal joint        Left lateral 5th metatarsal base with pre ulcerative callus and abrasion      11/06/2024    Images did not load    08/01/2024 06/27/2024 06/04/2024 02/28/2024    Pre ulcerative callus to left lateral midfoot      Previous site of left medial heel wound        11/15/2023                      08/23/2023     Left foot                Right foot              07/19/2023 06/21/2023 04/12/23:                    Assessment:       Encounter Diagnoses   Name Primary?    Pre-ulcerative calluses Yes    History of diabetic ulcer of foot     Type 2 diabetes mellitus with stage 5 chronic kidney disease                            Plan:        Diagnoses and all orders for this visit:    Pre-ulcerative calluses    History of diabetic ulcer of foot    Type 2 diabetes mellitus with stage 5 chronic kidney disease        I counseled the patient on his conditions, their implications and medical management.    Education about the prevention of limb loss.    Discussed wound healing cycle, skin integrity, ways to care for skin.Counseled patient on the effects of biomechanical pressure on healing. He verbalizes understanding that it can increase the chances of delayed healing and this prolonged exposure leads to infection or progression of infection which subsequently can result in loss of limb.    Adequate vitamin supplementation, protein intake, and hydration - discussed with patient    All wounds have remained healed, pre ulcerative sites stable.  Collagen gel applied to all pre ulcerative sites.  Skin is still delicate therefore patient must be diligent in avoiding excessive pressure and making sure there is adequate support and padding in shoe gear.     Follow-up: 7-10  weeks but should call [facility] immediately if any signs of infection, such as fever, chills, sweats, increased redness or pain.    Long-term goals include keeping the wound healed by good offloading and medical management under the direction of internist.    Shoe inspection. Diabetic Foot Education. Patient reminded of the importance of good nutrition and blood sugar control to help prevent podiatric complications of diabetes. Patient instructed on proper foot hygeine. We discussed wearing proper shoe gear, daily foot inspections, never walking without protective shoe gear, never putting sharp instruments to feet.

## 2025-04-05 NOTE — PATIENT INSTRUCTIONS
Shoe purchasing ideas: (try 6pm.[email or web address], [email or web address] , [email or web address], or shoes.Acquia for discounted prices) you can visit varsity shoes in [facility], DSW shoes in [place]  or bob rack in the [facility] (there are also several shoe brand outlets in the [facility])    ONLY purchase stability style tennis shoes AVOID flex, foam, free, yoga mat style shoes or shoes that claim to feel like clouds  If you have a flatter foot purchase shoes for PRONATION  If you have a high arch purchase shoes for SUPINATION    Shoe examples:    Asics (GT or gel foundations, gel-kayano-30), new balance stability type shoes (such as the 940 series or the [id number]), saucony (Guide 16, stabil c3),  Valencia (GTS or Beast or transcend), propet, HokaOne (dimitrios 7, arahi, mariel) Jaden (tennis shoes and boots)    Sofft Brand (women) Reginald&Desmond (men), barehaleigh, clarks, crocs, aerosoles, naturalizers, SAS, ecco, born, pauline buck, rockports (dress shoes)    Vionic, burkenstocks, fitflops, propet, taos, baretraps, oofos, Hoka or vionic recovery slides/sandals (sandals)    Hoka or vionic recovery slides/sandals, birkenstock rubber sandals, crocs(especially the recovery and support styles), propet. Bared, vionic slippers, PowerStep slippers, Aetrex slippers (house shoes)    Over the Counter Insert Recommendations (always go for FULL length inserts):  - Spenco brand (orthotic are or total support)  - SuperFeet (look for the ones that offer support and/or pain relief)  - Julia   - PowerStep [place]  - OrthoFeet      Over the counter pain creams: Voltaren Gel, Biofreeze, Bengay, tiger balm, two old goat, lidocaine gel,  Absorbine Veterinary Liniment Gel Topical Analgesic Sore Muscle and Joint Pain Relief    Alternative Pain remedies: Omega-3 EFAs, tumeric with black pepper, green tea, Bromelain, Arnica, garlic    Anti-inflammatory foods  An anti-inflammatory diet should include these foods:  tomatoes  olive oil  green leafy  vegetables, such as spinach, kale, and collards  nuts like almonds and walnuts  fatty fish like salmon, mackerel, tuna, and sardines  fruits such as strawberries, blueberries, cherries, and oranges   Foods that cause inflammation  Try to avoid or limit these foods as much as possible:  refined carbohydrates, such as white bread and pastries  French fries and other fried foods  soda and other sugar-sweetened beverages  red meat (burgers, steaks) and processed meat (hot dogs, sausage)  margarine, shortening, and lard        Recommend lotions: eucerin, eucerin for diabetics, aquaphor, A&D ointment, gold bond for diabetics, sween, Jah's Bees all purpose baby ointment,  urea 40 with aloe or SkinIntegra rapid crack repair (found on amazon.com)  Nail Home remedy: Vicks Vapor rub or Emuaid to nails for easier manageability    How Diabetes Can Affect Your Feet   Diabetes can lead to serious complications in the feet due to its impact on nerves, blood flow, and the immune system. Proper foot care is essential to prevent complications such as ulcers, infections, and amputations.  1. Nerve Damage (Diabetic Neuropathy)   What Happens? High blood sugar damages nerves, leading to loss of sensation in the feet.   Symptoms: Numbness, tingling, burning pain, or complete lack of feeling.  -  Why Its a Problem?   You may not feel cuts, blisters, or injuries, increasing the risk of infection.   Changes in foot shape can lead to pressure points, deformities, and ulcers.    2. Poor Circulation (Peripheral Arterial Disease - PAD)   What Happens? Diabetes causes narrowed or blocked arteries, reducing blood flow to the feet.   Symptoms: Cold feet, slow-healing wounds, pain when walking (claudication).  - Why Its a Problem?   Wounds heal slowly or not at all, increasing infection risk.   Tissue death (gangrene) may occur, leading to amputation.    3. Increased Risk of Infections   What Happens? High blood sugar weakens the immune system,  making infections more severe.   Common Foot Infections:   Skin infections (cellulitis)   Fungal infections (athletes foot, toenail fungus)   Bone infections (osteomyelitis) from untreated ulcers  - Why Its a Problem?   Minor infections can spread quickly.   In severe cases, amputation may be required.    4. Diabetic Foot Ulcers   What Happens? Pressure points, poor sensation, and slow healing lead to open wounds on the foot.   Common Causes:   Ill-fitting shoes (pressure ulcers).   Calluses & corns that break down into wounds.   Injuries that go unnoticed due to neuropathy.  - Why Its a Problem?   Ulcers can become infected and lead to gangrene.   If the infection spreads to bone, amputation may be necessary.    5. Charcot Foot (Diabetic Foot Deformity)   What Happens? Nerve damage leads to unnoticed fractures, causing the foot to collapse over time.   Symptoms: Swelling, redness, warmth, and foot shape changes.  - Why Its a Problem?   Can cause severe deformities, making walking difficult.   Leads to pressure ulcers and infections.    6. Dry Skin & Cracking (Autonomic Neuropathy)   What Happens? Diabetes affects nerves that control sweating, leading to dry skin and cracking.   Symptoms: Peeling, cracking heels, increased calluses.  - Why Its a Problem?   Cracks allow bacteria to enter, increasing infection risk.   Skin breakdown can lead to ulcers.    How to Prevent Foot Complications:  ? Check your feet daily for cuts, redness, or swelling.  ? Moisturize but avoid lotion between toes (prevents fungal growth).  ? Wear well-fitting shoes and avoid walking barefoot.  ? Trim nails carefully (or have a podiatrist trim them if feet have been determined to be high risk).  ? Manage blood sugar levels to reduce nerve damage.  ? See a podiatrist annually or as advised based on exam.         Diabetes: Inspecting Your Feet  Diabetes increases your chances of developing foot problems. So inspect your feet every day. This  helps you find small skin irritations before they become serious infections. If you have trouble seeing the bottoms of your feet, use a mirror or ask a family member or friend to help.     Pressure spots on the bottom of the foot are common areas where problems develop.   How to check your feet  Below are tips to help you look for foot problems. Try to check your feet at the same time each day, such as when you get out of bed in the morning:  Check the top of each foot. The tops of toes, back of the heel, and outer edge of the foot can get a lot of rubbing from poor-fitting shoes.  Check the bottom of each foot. Daily wear and tear often leads to problems at pressure spots.  Check the toes and nails. Fungal infections often occur between toes. Toenail problems can also be a sign of fungal infections or lead to breaks in the skin.  Check your shoes, too. Loose objects inside a shoe can injure the foot. Use your hand to feel inside your shoes for things like james, loose stitching, or rough areas that could irritate your skin.  Warning signs  Look for any color changes in the foot. Redness with streaks can signal a severe infection, which needs immediate medical attention. Tell your doctor right away if you have any of these problems:  Swelling, sometimes with color changes, may be a sign of poor blood flow or infection. Symptoms include tenderness and an increase in the size of your foot.  Warm or hot areas on your feet may be signs of infection. A foot that is cold may not be getting enough blood.  Sensations such as burning, tingling, or pins and needles can be signs of a problem. Also check for areas that may be numb.  Hot spots are caused by friction or pressure. Look for hot spots in areas that get a lot of rubbing. Hot spots can turn into blisters, calluses, or sores.  Cracks and sores are caused by dry or irritated skin. They are a sign that the skin is breaking down, which can lead to infection.  Toenail  problems to watch for include nails growing into the skin (ingrown toenail) and causing redness or pain. Thick, yellow, or discolored nails can signal a fungal infection.  Drainage and odor can develop from untreated sores and ulcers. Call your doctor right away if you notice white or yellow drainage, bleeding, or unpleasant odor.   © 3740-5740 [employer] [address] [place] [address]. All rights reserved. This information is not intended as a substitute for professional medical care. Always follow your healthcare professional's instructions.        Step-by-Step:  Inspecting Your Feet (Diabetes)    Date Last Reviewed: 10/1/2016  © 1036-5757 [employer] [address] [place] [address]. All rights reserved. This information is not intended as a substitute for professional medical care. Always follow your healthcare professional's instructions.

## 2025-04-07 NOTE — BRIEF OP NOTE
[facility] [place] - Interventional Radiology  Brief Operative Note    Surgery Date: 4/3/25    Surgeon: Daniel Poon MD    Assisting: None    Pre-op Diagnosis:  AVF stenosis    Post-op Diagnosis:  same    Procedure: LUE fistulogram with PTA    Anesthesia: Moderate sedation    Operative Findings: improved flow    Estimated Blood Loss: <10 cc         Specimens:   Specimen (24h ago, onward)      None              Discharge Note    OUTCOME: Patient tolerated treatment/procedure well without complication and is now ready for discharge.    DISPOSITION: Home or Self Care    FINAL DIAGNOSIS:  <principal problem not specified>    FOLLOWUP: In clinic    DISCHARGE INSTRUCTIONS:    Discharge Procedure Orders   Remove dressing in 48 hours     Activity as tolerated

## 2025-04-15 DIAGNOSIS — Z99.2 ESRD ON HEMODIALYSIS: Primary | ICD-10-CM

## 2025-04-15 DIAGNOSIS — N18.6 ESRD ON HEMODIALYSIS: Primary | ICD-10-CM

## 2025-04-15 DIAGNOSIS — T82.858A ARTERIOVENOUS FISTULA STENOSIS, INITIAL ENCOUNTER: ICD-10-CM

## 2025-04-16 ENCOUNTER — TELEPHONE (OUTPATIENT)
Dept: VASCULAR SURGERY | Facility: CLINIC | Age: 60
End: 2025-04-16
Payer: COMMERCIAL

## 2025-04-16 NOTE — TELEPHONE ENCOUNTER
Called pt.regarding post-op follow up appt. and ultrasound test needed prior. No answer and unable to leave message. Called [facility] and spoke with Chayito. Pt. Changed location where he gets his treatments done. He now goes to [facility] on [address] ph#[phone]. Called and spoke with staff. Pt. getting dialysis now and will answer his phone when we call. I called and spoke with pt. Aware of post-op follow up appt.with vascular provider with ultrasound test scheduled prior. Pt. States he does not have transportation. I told him to speak with the nurse and  to assist him with transportation. I called [facility] and spoke with pt. nurse Judy and aware. Has address of where ultrasound is and address for our office. Will look into helping pt. getting transportation. Has our office number if any questions or issues. Message sent to provider.

## 2025-04-23 ENCOUNTER — HOSPITAL ENCOUNTER (EMERGENCY)
Facility: HOSPITAL | Age: 60
Discharge: HOME | End: 2025-04-24
Attending: EMERGENCY MEDICINE
Payer: COMMERCIAL

## 2025-04-23 DIAGNOSIS — T82.590A MALFUNCTION OF ARTERIOVENOUS DIALYSIS FISTULA, INITIAL ENCOUNTER: Primary | ICD-10-CM

## 2025-04-23 DIAGNOSIS — I10 HYPERTENSION, UNSPECIFIED TYPE: ICD-10-CM

## 2025-04-23 PROCEDURE — 86850 RBC ANTIBODY SCREEN: CPT | Performed by: EMERGENCY MEDICINE

## 2025-04-23 PROCEDURE — 25000003 PHARM REV CODE 250

## 2025-04-23 PROCEDURE — 63600175 PHARM REV CODE 636 W HCPCS

## 2025-04-23 PROCEDURE — 96374 THER/PROPH/DIAG INJ IV PUSH: CPT

## 2025-04-23 PROCEDURE — 99284 EMERGENCY DEPT VISIT MOD MDM: CPT | Mod: 25

## 2025-04-23 RX ORDER — HYDRALAZINE HYDROCHLORIDE 20 MG/ML
5 INJECTION INTRAMUSCULAR; INTRAVENOUS
Status: COMPLETED | OUTPATIENT
Start: 2025-04-23 | End: 2025-04-23

## 2025-04-23 RX ORDER — HYDRALAZINE HYDROCHLORIDE 25 MG/1
100 TABLET, FILM COATED ORAL
Status: COMPLETED | OUTPATIENT
Start: 2025-04-23 | End: 2025-04-23

## 2025-04-23 RX ORDER — NIFEDIPINE 30 MG/1
30 TABLET, EXTENDED RELEASE ORAL
Status: COMPLETED | OUTPATIENT
Start: 2025-04-23 | End: 2025-04-23

## 2025-04-23 RX ORDER — NIFEDIPINE 10 MG/1
10 CAPSULE ORAL EVERY 8 HOURS
COMMUNITY

## 2025-04-23 RX ADMIN — HYDRALAZINE HYDROCHLORIDE 100 MG: 25 TABLET ORAL at 08:04

## 2025-04-23 RX ADMIN — HYDRALAZINE HYDROCHLORIDE 5 MG: 20 INJECTION, SOLUTION INTRAMUSCULAR; INTRAVENOUS at 09:04

## 2025-04-23 RX ADMIN — NIFEDIPINE 30 MG: 30 TABLET, FILM COATED, EXTENDED RELEASE ORAL at 11:04

## 2025-04-23 NOTE — ED PROVIDER NOTES
Encounter Date: 4/23/2025       History     Chief Complaint   Patient presents with    fistula problem     Fistula bleeding after dialysis, this is the 3rd time this week with bleeding, bleeding controlled at the moment.     SANDIP Mcfadden is a 60 y.o. male, PMH hyperlipidemia, hypertension ESRD on Monday Wednesday Friday HD.  Type 2 diabetes,  presenting with complaints of bleeding fistula from HD.  He reports bleeding it holding from pressure after HD.  He reports that his happen several times this week but he was never bled for as long as he did today.  Denies chest pain, shortness of breath, dizziness, changes in his vision.  Review of patient's allergies indicates:  No Known Allergies  Past Medical History:   Diagnosis Date    Cancer     Diabetes mellitus, type 2     Diabetic foot ulcer associated with diabetes mellitus due to underlying condition 07/16/2020    ESRD on hemodialysis     Hyperlipidemia     Hypertension     Obese      Past Surgical History:   Procedure Laterality Date    AV FISTULA PLACEMENT Left 07/06/2023    Procedure: CREATION, AV FISTULA;  Surgeon: Daniel Poon MD;  Location: [facility] OR;  Service: Vascular;  Laterality: Left;  RN PREOP 6/28/2023  LABS DAY OF SURGERY    BONE BIOPSY Left 07/17/2020    Procedure: BIOPSY, BONE;  Surgeon: Verona Whitmore DPM;  Location: [facility] OR;  Service: Podiatry;  Laterality: Left;    CERVICAL DISC SURGERY  07/11/2016    COLONOSCOPY N/A 3/25/2024    Procedure: COLONOSCOPY;  Surgeon: Roopa Pérez MD;  Location: [facility] (4TH FLR);  Service: Endoscopy;  Laterality: N/A;  Ref By: Dr. Pérez   constipation prep  Diaylsis Patient Lab has been ordered  3/20-precall complete-MS    COLONOSCOPY N/A 4/8/2024    Procedure: COLONOSCOPY;  Surgeon: Roopa Pérez MD;  Location: [facility] (2ND FLR);  Service: Endoscopy;  Laterality: N/A;  Prep instructions sent via portal/given to pt in clinic  pt had to be r/s at  request  Dialysis  Tues,Thurs,Sat-dw  Peg Prep-dw  4/2/24- Labs - dialysis /poor prep on 3/26 - PEG x 2 - extended prep /LVM for precall - ERW  4/5-LVM for precall-KPvt    COLONOSCOPY N/A 4/16/2024    Procedure: COLONOSCOPY;  Surgeon: Jarett Hill MD;  Location: [facility] (2ND FLR);  Service: Endoscopy;  Laterality: N/A;    DEBRIDEMENT Left 07/17/2020    Procedure: DEBRIDEMENT;  Surgeon: Verona Whitmore DPM;  Location: [facility] OR;  Service: Podiatry;  Laterality: Left;    DEBRIDEMENT OF FOOT Right     toes & plantar surface    ENDOBRONCHIAL ULTRASOUND N/A 3/1/2024    Procedure: ENDOBRONCHIAL ULTRASOUND (EBUS);  Surgeon: Francisco Fleming MD;  Location: [facility] OR 2ND FLR;  Service: Pulmonary;  Laterality: N/A;    ENDOSCOPIC MUCOSAL RESECTION OF COLON N/A 9/9/2024    Procedure: RESECTION, MUCOSA, COLON, ENDOSCOPIC;  Surgeon: Vikas Quinonez MD;  Location: [facility] (2ND FLR);  Service: Endoscopy;  Laterality: N/A;  6/11 portal-peg-dialysis T Th Sat-+K scheduled-.colonoscopy in 3 months with [facility] doc, [facility], removal of TI polyp and assess prior EMR sites. Cristiano-tt  7/19/24: rescheduled instructions sent via portal-GD  9/3-lvm for pre call-tb  9/4-pre call complete-    ESOPHAGEAL MANOMETRY WITH MEASUREMENT OF IMPEDANCE N/A 03/27/2023    Procedure: MANOMETRY, ESOPHAGUS, WITH IMPEDANCE MEASUREMENT;  Surgeon: Roopa Pérez MD;  Location: [facility] (4TH FLR);  Service: Endoscopy;  Laterality: N/A;  Belching and rumination protocol please  instructions via portal - sm    ESOPHAGOGASTRODUODENOSCOPY N/A 12/16/2022    Procedure: EGD (ESOPHAGOGASTRODUODENOSCOPY);  Surgeon: Eren Francois MD;  Location: [facility];  Service: Endoscopy;  Laterality: N/A;  Pt. on Dialysis. K+ ordered prior to procedure.EC    ESOPHAGOGASTRODUODENOSCOPY N/A 12/5/2023    Procedure: EGD (ESOPHAGOGASTRODUODENOSCOPY);  Surgeon: Kash Johnston MD;  Location: [facility];  Service: Endoscopy;  Laterality: N/A;    FRACTURE SURGERY Right     medial ankle, metal plate  present    INJECTION OF ANESTHETIC AGENT AROUND NERVE Right 2/2/2024    Procedure: BLOCK, NERVE STELLATE GANGLION *HOLD ASPIRIN 5 DAYS PRIOR*;  Surgeon: Gokul Gonzalez MD;  [facility];  Service: Pain Management;  Laterality: Right;  [phone]    INSERTION OF TUNNELED CENTRAL VENOUS CATHETER (CVC) WITH SUBCUTANEOUS PORT N/A 06/11/2021    Procedure: TUNNEL CATH INSERTION WITHOUT PORT;  Surgeon: Dosc Diagnostic Provider;  Location: [facility] OR;  Service: Radiology;  Laterality: N/A;  11AM START---PHONE PREOP 6/10/21---COVID POSTIVE ON 7/2020---NO S/S    left leg orthopedic surgery Left     MUSCLE BIOPSY Left 11/4/2024    Procedure: BIOPSY, MUSCLE;  Surgeon: Adithya Lewis MD;  Location: [facility] OR;  Service: Neurosurgery;  Laterality: Left;  Left quadriceps muscle biopsy, will need standard pathology protocol for muscle sample. MAC anesthetic with local anesthetic injection    RN PREOP 10/28/2024    REVISION OF ARTERIOVENOUS FISTULA Left 3/14/2024    Procedure: REVISION, AV FISTULA;  Surgeon: Daniel Poon MD;  Location: [facility] OR;  Service: Vascular;  Laterality: Left;  RN preop 3/6/2024----NEED ORDERS    UPPER GASTROINTESTINAL ENDOSCOPY       Family History   Adopted: Yes   Problem Relation Name Age of Onset    Heart disease Father      Colon cancer Neg Hx      Esophageal cancer Neg Hx      Colon polyps Neg Hx      Stomach cancer Neg Hx       Social History[1]  Review of Systems    Physical Exam     Initial Vitals [04/23/25 1734]   BP Pulse Resp Temp SpO2   (!) 179/95 63 18 98.6 °F (37 °C) 100 %      MAP       --         Physical Exam    Nursing note and vitals reviewed.  Constitutional: He appears well-developed and well-nourished. He is not diaphoretic. No distress.   HENT:   Head: Normocephalic.   Eyes: Conjunctivae and EOM are normal.   Neck:   Normal range of motion.  Cardiovascular:  Normal rate, regular rhythm and intact distal pulses.     Exam reveals no gallop and no friction rub.        No murmur heard.  Pulmonary/Chest: Breath sounds normal. No respiratory distress. He has no wheezes. He has no rhonchi. He has no rales.   Abdominal: Abdomen is soft. He exhibits no distension. There is no abdominal tenderness. There is no rebound and no guarding.   Musculoskeletal:         General: No edema.      Cervical back: Normal range of motion.      Comments: Left upper extremity AV fistula with palpable thrill edge at the time of exam.  Dressing change without evidence of active bleeding.     Neurological: He is alert. GCS score is 15. GCS eye subscore is 4. GCS verbal subscore is 5. GCS motor subscore is 6.   Skin: Skin is warm and dry.         ED Course   Procedures  Labs Reviewed - No data to display       Imaging Results    None          Medications   hydrALAZINE tablet 100 mg (100 mg Oral Given 4/23/25 2021)   hydrALAZINE injection 5 mg (5 mg Intravenous Given 4/23/25 2140)   NIFEdipine 24 hr tablet 30 mg (30 mg Oral Given 4/23/25 2321)     Medical Decision Making  Catalino Mcfadden  is a 60 y.o. male, presenting with complaints of AV fistula bleeding from dialysis, history of present illness obtained from pt  as seen above. Patient able to provide adequate and detailed history of present illness. Patient found to be well appearing and in no acute distress , stable. Exam notable as above.     DDX includes but is not limited to:  AV fistula bleeding, AV fistula aneurysm, thromboembolism.  No evidence of active bleeding on exam today.  However patient with significant hypertension although asymptomatic.  Systolics in the 220s.  He was treated with his home blood pressure medications with improvement.  He continued throughout his ED stay.  Referral was placed with vascular surgery for outpatient follow-up.    See ED course for additional discussion.   Data Reviewed/Counseling: I have reviewed the patient's vital signs, nursing notes, and other relevant tests/information. I had a detailed discussion regarding  the historical points, exam findings, and any diagnostic results supporting the discharge diagnosis. Any incidental findings were discussed with the patient. I also discussed the need for outpatient follow-up and the need to return to the ED if symptoms worsen or if there are any questions or concerns that arise at home. Strict return and follow-up precautions have been given by me personally to the patient/family/caregiver(s).  Disposition: Discharged     Risk  Prescription drug management.                                      Clinical Impression:  Final diagnoses:  [T82.590A] Malfunction of arteriovenous dialysis fistula, initial encounter (Primary)  [I10] Hypertension, unspecified type          ED Disposition Condition    Discharge Stable          ED Prescriptions    None       Follow-up Information       Follow up With Specialties Details Why Contact Info    Your Primary Care Provider  Schedule an appointment as soon as possible for a visit in 1 week  Follow up with your PCP as soon as possible. If you do not have a PCP, please follow up with [facility], you may contact them to schedule an appointment .    [facility] - Emergency Dept Emergency Medicine Go to  As needed, If symptoms worsen [address] [id number] [phone]    [facility] VASCULAR SURGERY Vascular Surgery   [address] [address]  [phone]                 [1]   Social History  Tobacco Use    Smoking status: Never    Smokeless tobacco: Never   Substance Use Topics    Alcohol use: Not Currently     Comment: occ rare beer    Drug use: No        Debby Noriega MD  Resident  04/24/25 0058

## 2025-04-23 NOTE — ED NOTES
"The patient was sent to the ER today via EMS from dialysis. He received his full run today and normally goes MWF. He states he was sent to the ER because "they couldn't get it to stop bleeding". Pt arrives with pressure dressing to the left upper extremity.   "

## 2025-04-24 VITALS
HEIGHT: 73 IN | SYSTOLIC BLOOD PRESSURE: 196 MMHG | WEIGHT: 190 LBS | TEMPERATURE: 99 F | HEART RATE: 66 BPM | BODY MASS INDEX: 25.18 KG/M2 | DIASTOLIC BLOOD PRESSURE: 95 MMHG | OXYGEN SATURATION: 100 % | RESPIRATION RATE: 20 BRPM

## 2025-04-24 NOTE — DISCHARGE INSTRUCTIONS
Diagnosis: AV Fistula Bleeding     Follow-Up Plan:  - Follow-up with primary care doctor within 3 - 5 days  - Additional testing and/or evaluation as directed by your primary doctor  -We have place referral with Vascular Surgery. Please look out for their call to schedule this appointment. We have included their contact info if you have issues.     Return to the Emergency Department for symptoms including but not limited to: worsening symptoms, shortness of breath or chest pain, vomiting with inability to hold down fluids, fevers greater than 100.4°F, passing out/fainting/unconsciousness, or other concerning symptoms.    We would like to thank you for coming today and our hope is that we served you and your family well during your stay

## 2025-04-24 NOTE — ED NOTES
Assumed care of patient at this time. Pt placed in hospital gown and currently lying in stretcher. Vital signs are stable, provided pt with warm blanket. Pt denied restroom use. No other complaints from pt at this time.    Review of patient's allergies indicates:  No Known Allergies  Past Medical History:   Diagnosis Date    Cancer     Diabetes mellitus, type 2     Diabetic foot ulcer associated with diabetes mellitus due to underlying condition 07/16/2020    ESRD on hemodialysis     Hyperlipidemia     Hypertension     Obese

## 2025-04-28 ENCOUNTER — OFFICE VISIT (OUTPATIENT)
Dept: VASCULAR SURGERY | Facility: CLINIC | Age: 60
End: 2025-04-28
Attending: NURSE PRACTITIONER
Payer: COMMERCIAL

## 2025-04-28 DIAGNOSIS — T82.858A ARTERIOVENOUS FISTULA STENOSIS, INITIAL ENCOUNTER: ICD-10-CM

## 2025-04-28 PROCEDURE — 99499 UNLISTED E&M SERVICE: CPT | Mod: S$GLB,,, | Performed by: SURGERY

## 2025-04-29 ENCOUNTER — TELEPHONE (OUTPATIENT)
Dept: VASCULAR SURGERY | Facility: CLINIC | Age: 60
End: 2025-04-29
Payer: COMMERCIAL

## 2025-04-29 NOTE — TELEPHONE ENCOUNTER
----- Message from Carley sent at 4/29/2025 10:41 AM CDT -----  Regarding: self  Who called: selfWhat is the request in detail: pt is trying to get US and visit of 4/28 rescheduled. Pt is stating he is only available Tuesday and Thursday appts but book it would not schedule both the same day. He is stating he needs the same day because of transportation. Pt is also asking if they have wheelchair for appts because he has trouble getting aroundCan the clinic reply by [facility]? NoWould the patient rather a call back or a response via [facility]? Call backBest call back number: [phone] Information:Thank you.  ----- Message -----  From: Carley Foley  Sent: 4/29/2025  10:44 AM CDT  To: Michelle Aguirre Staff  Subject: self                                             Who called: selfWhat is the request in detail: pt is trying to get US and visit of 4/28 rescheduled. Pt is stating he is only available Tuesday and Wednesdays but book it only showed Thursday appts. Pt is also asking if they have wheelchair for appts because he has trouble getting aroundCan the clinic reply by [facility]? NoWould the patient rather a call back or a response via [facility]? Call backBest call back number: [phone] Information:Thank you.

## 2025-04-29 NOTE — TELEPHONE ENCOUNTER
Spoke with pt. earlier regarding scheduling his follow up fistulagram appointment with vascular with ultrasound test prior. Pt. having issues with ride and unable to get from one location to another to have test done prior to appt. Pt. working on ride and getting assistance. He is only able to have appt. on Tuesday or Thursday. Need to correlate pt. appt. with provider and ultrasound on a non dialysis day and when pt.can set up transportation. Tried to call dialysis center to assist pt. and message sent to provider to see when available for appt. to correlate ultrasound test with appt. and awaiting response.

## 2025-04-30 ENCOUNTER — TELEPHONE (OUTPATIENT)
Dept: VASCULAR SURGERY | Facility: CLINIC | Age: 60
End: 2025-04-30
Payer: COMMERCIAL

## 2025-04-30 NOTE — TELEPHONE ENCOUNTER
Called and spoke with pt. Regarding his follow up appt. With vascular and ultrasound test prior. Pt.has transportation issues and says he will try to make appts. States he needs assistance coming to and from appt. Called dialysis center and clinci manager and  unavailable. Spoke with his nurse Judy and ezequiel. Will send them a message to see if can assist pt.

## 2025-05-07 ENCOUNTER — TELEPHONE (OUTPATIENT)
Dept: VASCULAR SURGERY | Facility: CLINIC | Age: 60
End: 2025-05-07
Payer: COMMERCIAL

## (undated) DEVICE — GLOVE SURGICAL LATEX SZ 6.5

## (undated) DEVICE — SEE MEDLINE ITEM 146292

## (undated) DEVICE — ELECTRODE REM PLYHSV RETURN 9

## (undated) DEVICE — SPONGE DERMACEA GAUZE 4X4

## (undated) DEVICE — ALCOHOL BLEND 95%

## (undated) DEVICE — NDL VIZISHOT 2 FLEX 22G

## (undated) DEVICE — SEE MEDLINE ITEM 107746

## (undated) DEVICE — CATH INTROCAN SAFETY 20GX1.75

## (undated) DEVICE — DRAPE STERI INSTRUMENT 1018

## (undated) DEVICE — GLOVE SURG BIOGEL LATEX SZ 7.5

## (undated) DEVICE — GOWN POLY REINF BRTH SLV XL

## (undated) DEVICE — SUT SILK 3-0 SH 18IN BLACK

## (undated) DEVICE — CONTAINER SPECIMEN STRL 4OZ

## (undated) DEVICE — PACK ENDOSCOPY GENERAL

## (undated) DEVICE — SUT 3-0 12-18IN SILK

## (undated) DEVICE — DRAPE PLASTIC U 60X72

## (undated) DEVICE — SYR 3CC LUER LOC

## (undated) DEVICE — HEMOSTAT SURGICEL 4X8IN

## (undated) DEVICE — TOWEL OR DISP STRL BLUE 4/PK

## (undated) DEVICE — PACK ARTHROSCOPY W/ISO BAC

## (undated) DEVICE — Device

## (undated) DEVICE — GEL AQUASONIC 100 STERILE20GM

## (undated) DEVICE — PAD PREP 50/CA

## (undated) DEVICE — GAUZE SPONGE 4X4 12PLY

## (undated) DEVICE — SUT 2-0 12-18IN SILK

## (undated) DEVICE — STOCKINET 4INX48

## (undated) DEVICE — BLADE SURG CARBON STEEL SZ11

## (undated) DEVICE — BLANKET LOWER BODY 55.9X40.2IN

## (undated) DEVICE — SUT 4-0 12-18IN SILK BLACK

## (undated) DEVICE — SYR 10CC LUER LOCK

## (undated) DEVICE — LOOP VESSEL BLUE MAXI

## (undated) DEVICE — GLOVE SENSICARE PI GRN 7.5

## (undated) DEVICE — DECANTER FLUID TRNSF WHITE 9IN

## (undated) DEVICE — POSITIONER HEAD DONUT 9IN FOAM

## (undated) DEVICE — COLLECTOR SPECIMEN ANAEROBIC

## (undated) DEVICE — ADHESIVE DERMABOND MINI HV

## (undated) DEVICE — SOL IRR SOD CHL .9% POUR

## (undated) DEVICE — TOWEL OR XRAY WHITE 17X26IN

## (undated) DEVICE — NDL ASPIRATING VIZISHOT 20-40M

## (undated) DEVICE — CYTOSPIN COLLECTION FLUID BLT

## (undated) DEVICE — SUT VICRYL CTD 2-0 GI 27 SH

## (undated) DEVICE — NDL HYPO REG 25G X 1 1/2

## (undated) DEVICE — DRAPE THREE-QUARTER 53X77IN

## (undated) DEVICE — SEE MEDLINE ITEM 157110

## (undated) DEVICE — SOL NACL IRR 3000ML

## (undated) DEVICE — COVER OVERHEAD SURG LT BLUE

## (undated) DEVICE — SEE MEDLINE ITEM 154981

## (undated) DEVICE — APPLICATOR CHLORAPREP ORN 26ML

## (undated) DEVICE — NDL 18GA X1 1/2 REG BEVEL

## (undated) DEVICE — NDL 11GA X 6 JAMSHIDI

## (undated) DEVICE — SUT VICRYL 3-0 27 SH

## (undated) DEVICE — KIT PROBE COVER WITH GEL

## (undated) DEVICE — SUT VICRYL+ 2-0 SH 18IN

## (undated) DEVICE — PACK ECLIPSE SET-UP W/O DRAPE

## (undated) DEVICE — SUT 7/0 24IN PROLENE BL MO

## (undated) DEVICE — UNDERGLOVE BIOGEL PI SZ 6.5 LF

## (undated) DEVICE — SUT MONOCRYL 4.0 PS2 CP496G

## (undated) DEVICE — DRAPE THREE-QTR REINF 53X77IN

## (undated) DEVICE — CLIP MED TICALL

## (undated) DEVICE — CONTAINER SPECIMEN OR STER 4OZ

## (undated) DEVICE — BOOT SUTURE AID

## (undated) DEVICE — DRAPE INCISE IOBAN 2 23X17IN

## (undated) DEVICE — ADAPTER SWIVEL

## (undated) DEVICE — BOWL STERILE LARGE 32OZ

## (undated) DEVICE — SOL NS 1000CC

## (undated) DEVICE — CATH IV JELCO 22GAX1

## (undated) DEVICE — KIT ANTIFOG W/SPONG & FLUID

## (undated) DEVICE — SYS LABLNG CORECT MED 4 FLG

## (undated) DEVICE — SEE MEDLINE ITEM 157173

## (undated) DEVICE — DRESSING TELFA N ADH 3X8

## (undated) DEVICE — SEE MEDLINE ITEM 152515

## (undated) DEVICE — SUPPORT ULNA NERVE PROTECTOR

## (undated) DEVICE — SEE L#120831

## (undated) DEVICE — TOURNIQUET SB QC DP 18X4IN

## (undated) DEVICE — CORD FOR BIPOLAR FORCEPS 12

## (undated) DEVICE — DURAPREP SURG SCRUB 26ML

## (undated) DEVICE — GOWN NONREINF SET-IN SLV XL

## (undated) DEVICE — PENCIL GOLF STERILE

## (undated) DEVICE — SYS LABEL CORRECT MED

## (undated) DEVICE — GLOVE SIGNATURE ESSNTL LTX 7.5

## (undated) DEVICE — GLOVE BIOGEL PI MICRO SZ 7.5

## (undated) DEVICE — BLANKET UPPER BODY 78.7X29.9IN

## (undated) DEVICE — SUT PROLENE 6-0 24 C-1 C-1

## (undated) DEVICE — DEV-O-LOOPS MINI BLUE

## (undated) DEVICE — PAD ABD 8X10 STERILE

## (undated) DEVICE — SUT PROLENE 3-0 PS-2 BL 18IN

## (undated) DEVICE — SEE MEDLINE ITEM 152622

## (undated) DEVICE — SPONGE COTTON TRAY 4X4IN

## (undated) DEVICE — SPONGE GAUZE 16PLY 4X4

## (undated) DEVICE — PENCIL ROCKER SWITCH 10FT CORD

## (undated) DEVICE — STAPLER SKIN PROXIMATE WIDE

## (undated) DEVICE — SET DECANTER MEDICHOICE

## (undated) DEVICE — SUT LIGACLIP SMALL XTRA

## (undated) DEVICE — DRESSING LEUKOPLAST FLEX 1X3IN

## (undated) DEVICE — SYR ONLY LUER LOCK 20CC

## (undated) DEVICE — CANISTER SUCTION 2 LTR

## (undated) DEVICE — PULSAVAC ZIMMER

## (undated) DEVICE — PAD CAST SPECIALIST STRL 4

## (undated) DEVICE — SUT MCRYL PLUS 4-0 PS2 27IN

## (undated) DEVICE — FORCEP STRAIGHT DISP

## (undated) DEVICE — LUBRICANT SURGILUBE 2 OZ

## (undated) DEVICE — GAUZE SPONGE XRAY 4X4

## (undated) DEVICE — LOOP VESSEL BLUE MINI 2/CARD

## (undated) DEVICE — GLOVE BIOGEL PI MICRO SZ 6.5

## (undated) DEVICE — SYR BULB EAR/ULCER STER 3OZ

## (undated) DEVICE — CATH BRONCHOSCOPE F/BF

## (undated) DEVICE — SYR SLIP TIP 20CC

## (undated) DEVICE — TUBING SUC UNIV W/CONN 12FT